# Patient Record
Sex: MALE | Race: WHITE | NOT HISPANIC OR LATINO | Employment: OTHER | URBAN - METROPOLITAN AREA
[De-identification: names, ages, dates, MRNs, and addresses within clinical notes are randomized per-mention and may not be internally consistent; named-entity substitution may affect disease eponyms.]

---

## 2017-11-02 ENCOUNTER — HOSPITAL ENCOUNTER (EMERGENCY)
Facility: HOSPITAL | Age: 77
Discharge: HOME | End: 2017-11-02
Attending: EMERGENCY MEDICINE | Admitting: EMERGENCY MEDICINE
Payer: MEDICARE

## 2017-11-02 ENCOUNTER — APPOINTMENT (EMERGENCY)
Dept: RADIOLOGY | Facility: HOSPITAL | Age: 77
End: 2017-11-02
Payer: MEDICARE

## 2017-11-02 VITALS
BODY MASS INDEX: 26.66 KG/M2 | WEIGHT: 180 LBS | SYSTOLIC BLOOD PRESSURE: 150 MMHG | DIASTOLIC BLOOD PRESSURE: 73 MMHG | OXYGEN SATURATION: 100 % | RESPIRATION RATE: 18 BRPM | HEIGHT: 69 IN | TEMPERATURE: 98.2 F | HEART RATE: 85 BPM

## 2017-11-02 DIAGNOSIS — S52.90XA RADIUS FRACTURE: Primary | ICD-10-CM

## 2017-11-02 PROCEDURE — 73110 X-RAY EXAM OF WRIST: CPT

## 2017-11-02 PROCEDURE — 99283 EMERGENCY DEPT VISIT LOW MDM: CPT

## 2017-11-02 RX ORDER — OXYCODONE HYDROCHLORIDE AND ACETAMINOPHEN 5; 325 MG/1; MG/1
1 TABLET ORAL EVERY 4 HOURS PRN
Qty: 20 TABLET | Refills: 0 | Status: SHIPPED | OUTPATIENT
Start: 2017-11-02 | End: 2017-11-09

## 2017-11-02 RX ORDER — ALLOPURINOL 100 MG/1
100 TABLET ORAL 2 TIMES DAILY
COMMUNITY

## 2017-11-02 RX ORDER — PIOGLITAZONEHYDROCHLORIDE 45 MG/1
45 TABLET ORAL DAILY
Status: ON HOLD | COMMUNITY
End: 2019-01-10

## 2017-11-02 RX ORDER — REPAGLINIDE 0.5 MG/1
1 TABLET ORAL
COMMUNITY
End: 2018-12-18

## 2017-11-02 RX ORDER — OXYCODONE HYDROCHLORIDE AND ACETAMINOPHEN 5; 325 MG/1; MG/1
2 TABLET ORAL ONCE
Status: COMPLETED | OUTPATIENT
Start: 2017-11-02 | End: 2017-11-02

## 2017-11-02 RX ORDER — IRBESARTAN 300 MG/1
300 TABLET ORAL
COMMUNITY
End: 2019-01-16 | Stop reason: HOSPADM

## 2017-11-02 RX ORDER — GLYBURIDE 5 MG/1
5 TABLET ORAL 2 TIMES DAILY WITH MEALS
COMMUNITY
End: 2020-07-15

## 2017-11-02 RX ORDER — BRIMONIDINE TARTRATE, TIMOLOL MALEATE 2; 5 MG/ML; MG/ML
1 SOLUTION/ DROPS OPHTHALMIC EVERY 12 HOURS SCHEDULED
COMMUNITY
End: 2019-01-16 | Stop reason: HOSPADM

## 2017-11-02 RX ORDER — LATANOPROST 50 UG/ML
1 SOLUTION/ DROPS OPHTHALMIC
COMMUNITY

## 2017-11-02 RX ORDER — PROPRANOLOL HYDROCHLORIDE AND HYDROCHLOROTHIAZIDE 80; 25 MG/1; MG/1
1 TABLET ORAL DAILY
COMMUNITY
End: 2018-12-18

## 2017-11-02 RX ORDER — FUROSEMIDE 40 MG/1
40 TABLET ORAL DAILY
COMMUNITY
End: 2021-01-11

## 2017-11-02 RX ADMIN — OXYCODONE HYDROCHLORIDE AND ACETAMINOPHEN 2 TABLET: 5; 325 TABLET ORAL at 11:50

## 2017-11-02 NOTE — DISCHARGE INSTRUCTIONS
Arm Fracture in Adults   WHAT YOU NEED TO KNOW:   An arm fracture is a crack or break in one or more of the bones in your arm  An arm fracture may be caused by a fall onto an outstretched hand  It may also be caused by trauma from a car accident or a sports injury  Osteoporosis (brittle bones) can increase your risk for a fracture  DISCHARGE INSTRUCTIONS:   Return to the emergency department if:   · The pain in your injured arm does not get better or gets worse, even after you rest and take medicine  · Your injured arm, hand, or fingers feel numb  · Your arm is swollen, red, and feels warm  · Your skin over the arm fracture is swollen, cold, or pale  · You cannot move your arm, hand, or fingers  Contact your healthcare provider if:   · You have a fever  · Your brace or splint becomes wet, damaged, or comes off  · You have questions or concerns about your injury, treatment, or care  Medicines:   · NSAIDs , such as ibuprofen, help decrease swelling and pain  This medicine is available with or without a doctor's order  NSAIDs can cause stomach bleeding or kidney problems in certain people  If you take blood thinner medicine, always ask your healthcare provider if NSAIDs are safe for you  Always read the medicine label and follow directions  · Acetaminophen  decreases pain  It is available without a doctor's order  Ask how much to take and how often to take it  Follow directions  Acetaminophen can cause liver damage if not taken correctly  · Prescription pain medicine  may be given  Ask how to take this medicine safely  · Take your medicine as directed  Contact your healthcare provider if you think your medicine is not helping or if you have side effects  Tell him or her if you are allergic to any medicine  Keep a list of the medicines, vitamins, and herbs you take  Include the amounts, and when and why you take them  Bring the list or the pill bottles to follow-up visits   Carry your medicine list with you in case of an emergency  Follow up with your healthcare provider within 1 week: You may need to see a bone specialist within 3 to 4 days if you need surgery or further treatment for your arm fracture  Write down your questions so you remember to ask them during your visits  Rest:  You should rest your arm as much as possible  Ask your healthcare provider when you can put pressure or weight on your arm  Also ask when you can return to sports or vigorous exercises  Ice:  Apply ice on your arm for 15 to 20 minutes every hour or as directed  Use an ice pack, or put crushed ice in a plastic bag  Cover it with a towel  Ice helps prevent tissue damage and decreases swelling and pain  Elevate:  Elevate your arm above the level of your heart as often as you can  This will help decrease swelling and pain  Prop your arm on pillows or blankets to keep it elevated comfortably  Care for your cast or splint:  Ask your healthcare provider when it is okay to bathe  Do not get your cast or splint wet  Before you take a bath or shower, cover your cast or splint with a plastic bag  Tape the bag to your skin to help keep water out  Hold your arm away from the water in case the bag leaks  · Check the skin around your cast or splint each day for any redness or open skin  · Do not use a sharp or pointed object to scratch your skin under the cast or splint  Physical therapy:  A physical therapist teaches you exercises to help improve movement and strength, and to decrease pain  © 2017 [employer] Information is for End User's use only and may not be sold, redistributed or otherwise used for commercial purposes  All illustrations and images included in CareNotes® are the copyrighted property of [employer] , [employer]  or Osmany Doss  The above information is an  only  It is not intended as medical advice for individual conditions or treatments   Talk to your doctor, nurse or pharmacist before following any medical regimen to see if it is safe and effective for you

## 2017-11-02 NOTE — ED PROVIDER NOTES
History  Chief Complaint   Patient presents with    Arm Injury     Pt sts fell, injured left wrist   Obvious deformity to same     Patient was carrying a heavy object, ie, a water hose down the cellar steps when he slipped and fell on outstretched left hand  This occurred an hour and half ago  The patient has an obvious deformity to the left wrist, states he knows it is broken  Patient 1st finished  the chore he was doing, then drove home we get somebody to drive him to the hospital   Patient had no prior fractures  He is right-handed  Prior to Admission Medications   Prescriptions Last Dose Informant Patient Reported? Taking?   allopurinol (ZYLOPRIM) 100 mg tablet   Yes Yes   Sig: Take 100 mg by mouth 2 (two) times a day   brimonidine-timolol (COMBIGAN) 0 2-0 5 %   Yes Yes   Sig: Administer 1 drop to both eyes every 12 (twelve) hours   furosemide (LASIX) 40 mg tablet   Yes Yes   Sig: Take 40 mg by mouth daily   glyBURIDE (DIABETA) 5 mg tablet   Yes Yes   Sig: Take 10 mg by mouth 2 (two) times a day with meals   irbesartan (AVAPRO) 300 mg tablet   Yes Yes   Sig: Take 300 mg by mouth daily at bedtime   latanoprost (XALATAN) 0 005 % ophthalmic solution   Yes Yes   Si drop daily at bedtime   pioglitazone (ACTOS) 45 mg tablet   Yes Yes   Sig: Take 45 mg by mouth daily   propranolol-hydrochlorothiazide (INDERIDE) 80-25 MG per tablet   Yes Yes   Sig: Take 1 tablet by mouth daily   repaglinide (PRANDIN) 0 5 mg tablet   Yes Yes   Sig: Take 1 mg by mouth 2 (two) times a day before meals      Facility-Administered Medications: None       Past Medical History:   Diagnosis Date    Diabetes mellitus ([facility] [address] )        History reviewed  No pertinent surgical history  History reviewed  No pertinent family history  I have reviewed and agree with the history as documented      Social History   Substance Use Topics    Smoking status: Former Smoker    Smokeless tobacco: Former User    Alcohol use No Review of Systems   Constitutional: Negative for fever  Musculoskeletal: Positive for arthralgias, joint swelling and myalgias  Negative for back pain and neck pain  Neurological: Negative for syncope and numbness  All other systems reviewed and are negative  Physical Exam  ED Triage Vitals [11/02/17 1120]   Temperature Pulse Respirations Blood Pressure SpO2   98 2 °F (36 8 °C) 85 18 150/73 100 %      Temp src Heart Rate Source Patient Position - Orthostatic VS BP Location FiO2 (%)   -- -- -- -- --      Pain Score       8           Orthostatic Vital Signs  Vitals:    11/02/17 1120   BP: 150/73   Pulse: 85       Physical Exam   Constitutional: He is oriented to person, place, and time  He appears well-developed and well-nourished  HENT:   Head: Normocephalic and atraumatic  Mouth/Throat: Oropharynx is clear and moist    Eyes: Conjunctivae are normal    Neck: Normal range of motion  Neck supple  Cardiovascular: Normal rate, regular rhythm, normal heart sounds and intact distal pulses  Pulmonary/Chest: Effort normal and breath sounds normal    Abdominal: Soft  There is no tenderness  Musculoskeletal: He exhibits tenderness and deformity  Tender deformity of the distal left wrist, fingers are all neurovascularly intact   Neurological: He is alert and oriented to person, place, and time  Skin: Skin is warm and dry  Psychiatric: He has a normal mood and affect  His behavior is normal    Vitals reviewed  ED Medications  Medications   oxyCODONE-acetaminophen (PERCOCET) 5-325 mg per tablet 2 tablet (2 tablets Oral Given 11/2/17 1150)       Diagnostic Studies  Results Reviewed     None                 XR wrist 3+ views LEFT   Final Result by Franklin Jeronimo MD (11/02 1205)      Fracture in the distal left radius with slight posterior displacement           Workstation performed: [id number]                    Procedures  Procedures       Phone Contacts  ED Phone Contact    ED Course  ED Course                                MDM  Number of Diagnoses or Management Options  Diagnosis management comments: Patient likely fractured the wrist   We will provide some pain medication and get x-rays asap  The case and the x-rays were discussed with Dr Sinan Dean from Orthopedics  He suggested a sugar-tong splint, which I placed on the patient recheck his neurovascular status, and placed him in a sling  He will follow up Monday or Tuesday    CritCare Time    Disposition  Final diagnoses:   Radius fracture     Time reflects when diagnosis was documented in both MDM as applicable and the Disposition within this note     Time User Action Codes Description Comment    11/2/2017 12:28 PM Stanley Poole Add [S52 [id number]] Radius fracture       ED Disposition     ED Disposition Condition Comment    Discharge  [facility] discharge to home/self care  Condition at discharge: Stable        Follow-up Information     Follow up With Specialties Details Why Cindy [facility] , DO Orthopedic Surgery Schedule an appointment as soon as possible for a visit in 1 day  [employer] [address]  [phone]          Patient's Medications   Discharge Prescriptions    OXYCODONE-ACETAMINOPHEN (PERCOCET) 5-325 MG PER TABLET    Take 1 tablet by mouth every 4 (four) hours as needed for severe pain for up to 7 days Max Daily Amount: 6 tablets       Start Date: 11/2/2017 End Date: 11/9/2017       Order Dose: 1 tablet       Quantity: 20 tablet    Refills: 0     No discharge procedures on file      ED Provider  Electronically Signed by           Ephraim Ly MD  11/02/17 6268

## 2017-11-07 ENCOUNTER — APPOINTMENT (OUTPATIENT)
Dept: RADIOLOGY | Facility: CLINIC | Age: 77
End: 2017-11-07
Payer: MEDICARE

## 2017-11-07 ENCOUNTER — ALLSCRIPTS OFFICE VISIT (OUTPATIENT)
Dept: OTHER | Facility: OTHER | Age: 77
End: 2017-11-07

## 2017-11-07 DIAGNOSIS — M25.532 PAIN IN LEFT WRIST: ICD-10-CM

## 2017-11-07 DIAGNOSIS — S52.502A CLOSED FRACTURE OF LOWER END OF LEFT RADIUS: ICD-10-CM

## 2017-11-07 PROCEDURE — 73100 X-RAY EXAM OF WRIST: CPT

## 2017-11-08 NOTE — PROGRESS NOTES
Assessment  1  Left wrist pain (783 43) (P57 178)   2  Closed traumatic displaced fracture of distal end of left radius, initial encounter (573 42)   (A23 885A)    Plan  Left wrist pain    · * XR WRIST 3+ VIEW LEFT; Status:Active - Retrospective By Protocol Authorization; Requested for:07Nov2017;    · XR WRIST 2 VIEW LEFT; Status:Active; Requested TTH:[id number];     Discussion/Summary    Mayco Avitia is a right-hand-dominant 22-year-old male that presents today with an extra-articular distal radius fracture that is minimally displaced  We discussed operative versus non operative treatment options and the risks and benefits of both  The patient would prefer to undergo a hematoma block and reduction of his 11day-old fracture here in the office today in attempts to avoid surgical intervention  This was performed here in the office today please see the procedure note above  Post reduction x-rays demonstrate reduction of the dorsal displacement and angulation with proper splint application  I advised the patient that if his distal radius remains in this position over the next week we may continue to attempt closed treatment with serial x-rays and casting  If his displacement returns and his distal radius continues to fall off dorsally he will need surgical fixation  He is aware this  I advised him to keep his splint on clean and dry  He was neurovascularly intact after the splinting procedure  He is to be nonweightbearing left upper extremity  I will see him back in 1 week with x-rays of his left distal radius in the splint  The patient may call the office at anytime if any questions or concerns should arise  The patient has the current Goals: Left distal radius fracture healing  Patient is able to Self-Care  Educational resources provided: Operative versus non operative treatment recommendations discussed  The treatment plan was reviewed with the patient/guardian   The patient/guardian understands and agrees with the treatment plan      Chief Complaint  1  Wrist Pain  Left distal radius fracture      History of Present Illness  HPI: Cj Cross is a 69-year-old right-hand-dominant  that is here for evaluation of left wrist pain after a fall down in the basement 5 days ago  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 and tripped and fell onto his outstretched hand  Medially after the fall he had pain and swelling in his left wrist  He had difficulty with motion  He went to the emergency department and was found to have a distal radius fracture  He denies paresthesias and numbness into his left hand  He does report swelling in his left hand  Review of Systems    Constitutional: No fever or chills, feels well, no tiredness, no recent weight loss or weight gain  Eyes: No complaints of red eyes, no eyesight problems  ENT: no complaints of loss of hearing, no nosebleeds, no sore throat  Cardiovascular: No complaints of chest pain, no palpitations, no leg claudication or lower extremity edema  Respiratory: No complaints of shortness of breath, no wheezing, no cough  Gastrointestinal: No complaints of abdominal pain, no constipation, no nausea or vomiting, no diarrhea or bloody stools  Genitourinary: No complaints of dysuria or incontinence, no hesitancy, no nocturia  Musculoskeletal: arthralgias,-- joint swelling-- and-- joint stiffness, but-- no limb pain,-- no myalgias-- and-- no limb swelling  Integumentary: No complaints of skin rash or lesion, no itching or dry skin, no skin wounds  Neurological: No complaints of headache, no confusion, no numbness or tingling, no dizziness  Psychiatric: No suicidal thoughts, no anxiety, no depression  Endocrine: No muscle weakness, no frequent urination, no excessive thirst, no feelings of weakness  Active Problems  1   Left wrist pain (679 43) (Q98 828)    Past Medical History   · History of diabetes mellitus (V12 29) (Z86 39)   · History of hypertension (V12 59) (Z86 79)   · History of Skin melanoma (172 9) (C43 9)    The active problems and past medical history were reviewed and updated today  Surgical History   · History of Cataract Surgery    The surgical history was reviewed and updated today  Family History  Mother    · No pertinent family history    The family history was reviewed and updated today  Social History   · Never a smoker  The social history was reviewed and updated today  Current Meds   1  Allopurinol 100 MG Oral Tablet; Therapy: (0523-7600548) to Recorded   2  Combigan SOLN;   Therapy: (Recorded:07Nov2017) to Recorded   3  Furosemide TABS; Therapy: (0523-7600548) to Recorded   4  GlyBURIDE TABS; Therapy: (0523-7600548) to Recorded   5  Irbesartan TABS; Therapy: (0523-7600548) to Recorded   6  Latanoprost 0 005 % Ophthalmic Solution; Therapy: (0523-7600548) to Recorded   7  Pioglitazone HCl TABS; Therapy: (0523-7600548) to Recorded   8  Propranolol-HCTZ TABS; Therapy: (0523-7600548) to Recorded   9  Repaglinide TABS; Therapy: (0523-7600548) to Recorded    The medication list was reviewed and updated today  Allergies  1  Sulfur    Vitals  Signs   Heart Rate: 86  Systolic: 989  Diastolic: 69  Height: 5 ft 9 in  Weight: 189 lb 6 08 oz  BMI Calculated: 27 97  BSA Calculated: 2 02    Physical Exam  General:  Awake alert orient x3, no acute distress left wrist skin diffusely intact, there is a mild dorsal deformity with tenderness to palpation  There is significant bruising and swelling in the hand without tenderness to palpation  No neurologic deficits in the wrist and hand  Active and passive range of motion of the fingers intact   Compartments soft in the forearm     Constitutional - General appearance: Normal    Musculoskeletal - Gait and station: Normal -- Muscle strength/tone: Normal -- Upper extremity compartments: Normal    Cardiovascular - Pulses: Normal    Skin - Skin and subcutaneous tissue: Normal    Neurologic - Sensation: Normal -- Lower extremity peripheral neuro exam: Normal    Psychiatric - Orientation to person, place, and time: Normal -- Mood and affect: Normal    Eyes   Conjunctiva and lids: Normal     Pupils and irises: Normal        Results/Data  I personally reviewed the films/images/results in the office today  My interpretation follows  X-ray Review X-rays obtained here in the office demonstrate extra-articular distal radius fracture with interval displacement dorsally after initial emergency room evaluation and radiographs  There is no intra-articular extension  There is mild dorsal displacement with still 50 percent cortical apposition however increased dorsal angulation  There is mild loss of height of the distal radius as well   reduction x-rays reviewed reviewed by me demonstrate adequate reduction of the extra-articular distal radius fracture with restoration of alignment and decreased dorsal angulation  There is dorsal cortical comminution  Procedure  Procedure: Splint application  of the left wrist    Material: Plaster over Webril  Type: distal sugar tong with the joint in slight flexion  Patient Status:  neurovascular exam after splint/cast application was unchanged  After informed verbal consent was obtained a left wrist hematoma block using 1 percent lidocaine plain and 0 5 percent Marcaine plain was infiltrated into the left distal radius fracture site  After anesthesia had taken hold the patient was placed into finger traps to allow ligamentous taxis  The left upper extremity was wrapped with Webril in preparation for plaster sugar-tong splint application  A reduction maneuver was performed after anesthesia had taken effect and a sugar-tong splint was applied to the left distal radius  Post reduction x-rays were ordered and reviewed   Patient was neurovascular intact after the reduction maneuver  Future Appointments    Date/Time Provider Specialty Site   11/14/2017 04:30 PM Jose Lee DO Orthopedic Surgery [facility]     Signatures   Electronically signed by :  Raine Cevallos DO; Nov 7 2017  6:05PM EST                       (Author)

## 2017-11-14 ENCOUNTER — GENERIC CONVERSION - ENCOUNTER (OUTPATIENT)
Dept: OTHER | Facility: OTHER | Age: 77
End: 2017-11-14

## 2017-11-14 ENCOUNTER — APPOINTMENT (OUTPATIENT)
Dept: RADIOLOGY | Facility: CLINIC | Age: 77
End: 2017-11-14
Payer: MEDICARE

## 2017-11-14 DIAGNOSIS — S52.502A CLOSED FRACTURE OF LOWER END OF LEFT RADIUS: ICD-10-CM

## 2017-11-14 PROCEDURE — 73100 X-RAY EXAM OF WRIST: CPT

## 2017-11-21 ENCOUNTER — APPOINTMENT (OUTPATIENT)
Dept: RADIOLOGY | Facility: CLINIC | Age: 77
End: 2017-11-21
Payer: MEDICARE

## 2017-11-21 ENCOUNTER — GENERIC CONVERSION - ENCOUNTER (OUTPATIENT)
Dept: OTHER | Facility: OTHER | Age: 77
End: 2017-11-21

## 2017-11-21 DIAGNOSIS — S52.502A CLOSED FRACTURE OF LOWER END OF LEFT RADIUS: ICD-10-CM

## 2017-11-21 PROCEDURE — 73100 X-RAY EXAM OF WRIST: CPT

## 2017-12-12 ENCOUNTER — GENERIC CONVERSION - ENCOUNTER (OUTPATIENT)
Dept: OTHER | Facility: OTHER | Age: 77
End: 2017-12-12

## 2017-12-12 ENCOUNTER — APPOINTMENT (OUTPATIENT)
Dept: RADIOLOGY | Facility: CLINIC | Age: 77
End: 2017-12-12
Payer: MEDICARE

## 2017-12-12 DIAGNOSIS — S52.502A CLOSED FRACTURE OF LOWER END OF LEFT RADIUS: ICD-10-CM

## 2017-12-12 PROCEDURE — 73100 X-RAY EXAM OF WRIST: CPT

## 2018-01-02 ENCOUNTER — APPOINTMENT (OUTPATIENT)
Dept: RADIOLOGY | Facility: CLINIC | Age: 78
End: 2018-01-02
Payer: MEDICARE

## 2018-01-02 ENCOUNTER — GENERIC CONVERSION - ENCOUNTER (OUTPATIENT)
Dept: OTHER | Facility: OTHER | Age: 78
End: 2018-01-02

## 2018-01-02 DIAGNOSIS — S52.502A CLOSED FRACTURE OF LOWER END OF LEFT RADIUS: ICD-10-CM

## 2018-01-02 PROCEDURE — 73100 X-RAY EXAM OF WRIST: CPT

## 2018-01-13 VITALS
HEIGHT: 69 IN | WEIGHT: 189.38 LBS | HEART RATE: 86 BPM | BODY MASS INDEX: 28.05 KG/M2 | DIASTOLIC BLOOD PRESSURE: 69 MMHG | SYSTOLIC BLOOD PRESSURE: 150 MMHG

## 2018-01-22 VITALS
HEART RATE: 81 BPM | SYSTOLIC BLOOD PRESSURE: 156 MMHG | WEIGHT: 189.38 LBS | BODY MASS INDEX: 28.05 KG/M2 | HEIGHT: 69 IN | DIASTOLIC BLOOD PRESSURE: 71 MMHG

## 2018-01-22 VITALS — SYSTOLIC BLOOD PRESSURE: 198 MMHG | HEART RATE: 80 BPM | DIASTOLIC BLOOD PRESSURE: 84 MMHG

## 2018-01-22 VITALS
BODY MASS INDEX: 29.25 KG/M2 | DIASTOLIC BLOOD PRESSURE: 78 MMHG | WEIGHT: 197.5 LBS | SYSTOLIC BLOOD PRESSURE: 203 MMHG | HEART RATE: 73 BPM | HEIGHT: 69 IN

## 2018-01-24 VITALS
HEIGHT: 69 IN | HEART RATE: 64 BPM | SYSTOLIC BLOOD PRESSURE: 212 MMHG | DIASTOLIC BLOOD PRESSURE: 79 MMHG | BODY MASS INDEX: 29.25 KG/M2 | WEIGHT: 197.5 LBS

## 2018-01-24 VITALS
BODY MASS INDEX: 26.36 KG/M2 | WEIGHT: 178 LBS | HEIGHT: 69 IN | SYSTOLIC BLOOD PRESSURE: 204 MMHG | DIASTOLIC BLOOD PRESSURE: 64 MMHG

## 2018-02-09 ENCOUNTER — APPOINTMENT (OUTPATIENT)
Dept: RADIOLOGY | Facility: CLINIC | Age: 78
End: 2018-02-09
Payer: MEDICARE

## 2018-02-09 VITALS
HEIGHT: 69 IN | BODY MASS INDEX: 26.19 KG/M2 | HEART RATE: 80 BPM | WEIGHT: 176.8 LBS | DIASTOLIC BLOOD PRESSURE: 64 MMHG | SYSTOLIC BLOOD PRESSURE: 126 MMHG

## 2018-02-09 DIAGNOSIS — S52.502G CLOSED TRAUMATIC DISPLACED FRACTURE OF DISTAL END OF LEFT RADIUS WITH DELAYED HEALING, SUBSEQUENT ENCOUNTER: Primary | ICD-10-CM

## 2018-02-09 DIAGNOSIS — S52.502K: Primary | ICD-10-CM

## 2018-02-09 PROBLEM — S52.509G: Status: ACTIVE | Noted: 2018-02-09

## 2018-02-09 PROCEDURE — 73100 X-RAY EXAM OF WRIST: CPT

## 2018-02-09 PROCEDURE — 99213 OFFICE O/P EST LOW 20 MIN: CPT | Performed by: ORTHOPAEDIC SURGERY

## 2018-02-09 NOTE — PROGRESS NOTES
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  At this point I am concerned with his bone biology and his underlying medical comorbidities that he is unable to rapidly progress to union  With this in mind I recommended he undergo exogen bone stimulator application and use  Arrangements will be made for him  His cast was maintained today after his x-rays were reviewed in preparation for his upcoming bone stimulator appointment at which point the portable be introduced into his new cast to accommodate the bone stimulator  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 and I advised him that I will be happy to fill out any paperwork to help accommodate appropriate schedule for taking this exam in light of his closed treatment of his distal radius fracture  He will return to the office with bone stimulator arrangements are made and can be coordinated  Subjective: 3 month follow-up closed treatment of left distal radius fracture  Patient ID: Kenyatta Enciso is a 76 y o  male  HPI  Jorge Sebastian is here for 3 month follow-up status post left extra-articular distal radius fracture  He denies any new onset pain  He denies any paresthesias in his wrist or hand  He reports compliance with cast care  He does report 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 he has questions about performing this surface case shin testing with the cast off  Review of Systems   Constitutional: Negative  HENT: Negative  Eyes: Negative  Respiratory: Negative  Cardiovascular: Negative  Gastrointestinal: Negative  Endocrine: Negative  Musculoskeletal: Negative  Skin: Negative  Neurological: Negative  Psychiatric/Behavioral: Negative  Past Medical History:   Diagnosis Date    Diabetes mellitus ([facility] [address] )        No past surgical history on file  No family history on file  Social History     Occupational History    Not on file  Social History Main Topics    Smoking status: Former Smoker    Smokeless tobacco: Former User    Alcohol use No    Drug use: No    Sexual activity: Not on file         Current Outpatient Prescriptions:     allopurinol (ZYLOPRIM) 100 mg tablet, Take 100 mg by mouth 2 (two) times a day, Disp: , Rfl:     brimonidine-timolol (COMBIGAN) 0 2-0 5 %, Administer 1 drop to both eyes every 12 (twelve) hours, Disp: , Rfl:     furosemide (LASIX) 40 mg tablet, Take 40 mg by mouth daily, Disp: , Rfl:     glyBURIDE (DIABETA) 5 mg tablet, Take 10 mg by mouth 2 (two) times a day with meals, Disp: , Rfl:     irbesartan (AVAPRO) 300 mg tablet, Take 300 mg by mouth daily at bedtime, Disp: , Rfl:     latanoprost (XALATAN) 0 005 % ophthalmic solution, 1 drop daily at bedtime, Disp: , Rfl:     pioglitazone (ACTOS) 45 mg tablet, Take 45 mg by mouth daily, Disp: , Rfl:     propranolol-hydrochlorothiazide (INDERIDE) 80-25 MG per tablet, Take 1 tablet by mouth daily, Disp: , Rfl:     repaglinide (PRANDIN) 0 5 mg tablet, Take 1 mg by mouth 2 (two) times a day before meals, Disp: , Rfl:     Allergies   Allergen Reactions    Sulfa Antibiotics        Objective:  Vitals:    02/09/18 1523   BP: 126/64   Pulse: 80       Body mass index is 26 11 kg/m²  Left Hand Exam     Other   Erythema: absent  Sensation: normal  Pulse: present    Comments:  Short-arm cast intact, skin edges around the perimeter intact without signs of skin breakdown  Neurovascular intact in hand and fingers            Physical Exam    I have personally reviewed pertinent films in PACS    X-rays of the left wrist in cast immobilization demonstrate maintenance of extra-articular distal radius fracture with mild impaction with minimal signs of callus formation and healing  There has been no interval displacement since last evaluation  Delvin WAGGONER    Division of Adult Reconstruction  Department of [facility]

## 2018-02-13 PROBLEM — S52.502K: Status: ACTIVE | Noted: 2018-02-09

## 2018-02-21 ENCOUNTER — APPOINTMENT (OUTPATIENT)
Dept: RADIOLOGY | Facility: CLINIC | Age: 78
End: 2018-02-21
Payer: MEDICARE

## 2018-02-21 DIAGNOSIS — S52.502K: Primary | ICD-10-CM

## 2018-02-21 PROCEDURE — 29075 APPL CST ELBW FNGR SHORT ARM: CPT | Performed by: ORTHOPAEDIC SURGERY

## 2018-02-21 PROCEDURE — 73100 X-RAY EXAM OF WRIST: CPT

## 2018-02-21 PROCEDURE — 99214 OFFICE O/P EST MOD 30 MIN: CPT | Performed by: ORTHOPAEDIC SURGERY

## 2018-02-21 NOTE — PROGRESS NOTES
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       Storm Scott is here for bone stimulator application  His prior cast was removed and his skin was inspected there was no sign of gross breakdown  He was placed into a short-arm cast today with bone stimulator port application present  Cast was applied by me  Bone stimulator represented was here for instructions on use moving forward  Like to see him back in 3 weeks time for repeat evaluation cast change for skin check and continued bone stimulator use  After his cast is removed upon arrival next week we will get x-rays out of the cast prior to replacing him in 1 with the bone stimulator port  Subjective:  Patient here for follow-up for closed treatment of left distal radius nonunion  Patient ID: Katie Gee is a 76 y o  male  HPI  Patient is here for continued follow-up regarding closed treatment of his left distal radius nonunion  He is here for bone stimulator port application instillation of a short-arm cast   Bone stimulator representative his here for instructions on use and instillation process  Review of Systems   Constitutional: Positive for activity change  HENT: Negative  Eyes: Negative  Respiratory: Negative  Cardiovascular: Negative  Gastrointestinal: Negative  Endocrine: Negative  Musculoskeletal: Positive for arthralgias  Skin: Negative  Neurological: Negative  Psychiatric/Behavioral: Negative  Past Medical History:   Diagnosis Date    Diabetes mellitus ([facility] [address] )        No past surgical history on file  No family history on file  Social History     Occupational History    Not on file       Social History Main Topics    Smoking status: Former Smoker    Smokeless tobacco: Former User    Alcohol use No    Drug use: No    Sexual activity: Not on file         Current Outpatient Prescriptions:     allopurinol (ZYLOPRIM) 100 mg tablet, Take 100 mg by mouth 2 (two) times a day, Disp: , Rfl:     brimonidine-timolol (COMBIGAN) 0 2-0 5 %, Administer 1 drop to both eyes every 12 (twelve) hours, Disp: , Rfl:     furosemide (LASIX) 40 mg tablet, Take 40 mg by mouth daily, Disp: , Rfl:     glyBURIDE (DIABETA) 5 mg tablet, Take 10 mg by mouth 2 (two) times a day with meals, Disp: , Rfl:     irbesartan (AVAPRO) 300 mg tablet, Take 300 mg by mouth daily at bedtime, Disp: , Rfl:     latanoprost (XALATAN) 0 005 % ophthalmic solution, 1 drop daily at bedtime, Disp: , Rfl:     pioglitazone (ACTOS) 45 mg tablet, Take 45 mg by mouth daily, Disp: , Rfl:     propranolol-hydrochlorothiazide (INDERIDE) 80-25 MG per tablet, Take 1 tablet by mouth daily, Disp: , Rfl:     repaglinide (PRANDIN) 0 5 mg tablet, Take 1 mg by mouth 2 (two) times a day before meals, Disp: , Rfl:     Allergies   Allergen Reactions    Sulfa Antibiotics     Sulfur        Objective: There were no vitals filed for this visit  There is no height or weight on file to calculate BMI  Right Knee Exam     Other   Other tests: effusion present      Right Hand Exam     Tenderness   Right hand tenderness location: Volar and dorsal wrist       Left Hand Exam     Tenderness   Left hand tenderness location: Volar and dorsal wrist      Range of Motion     Wrist   Extension: abnormal   Flexion: abnormal     Other   Erythema: absent  Scars: absent  Sensation: normal  Pulse: present    Comments:  Mild tenderness at volar and dorsal distal radius  No signs of skin breakdown in the palm or wrist           Observations     Right Knee   Positive for effusion  Physical Exam   Constitutional: He is oriented to person, place, and time  He appears well-developed  HENT:   Head: Atraumatic  Eyes: EOM are normal    Neck: Neck supple  Cardiovascular: Normal rate  Pulmonary/Chest: Effort normal    Abdominal: Soft  Musculoskeletal:        Right knee: He exhibits effusion     See orthopedic exam Neurological: He is alert and oriented to person, place, and time  Skin: Skin is warm and dry  Psychiatric: He has a normal mood and affect  Nursing note and vitals reviewed  I have personally reviewed pertinent films in PACS  X-rays of the left wrist reviewed by me after cast placement  X-rays demonstrate appropriate position of bone stimulator port over fracture site  Cast is well placed and aligned  Charla WAGGONER    Division of Adult Reconstruction  Department of [facility]

## 2018-03-14 ENCOUNTER — OFFICE VISIT (OUTPATIENT)
Dept: OBGYN CLINIC | Facility: CLINIC | Age: 78
End: 2018-03-14
Payer: MEDICARE

## 2018-03-14 ENCOUNTER — APPOINTMENT (OUTPATIENT)
Dept: RADIOLOGY | Facility: CLINIC | Age: 78
End: 2018-03-14
Payer: MEDICARE

## 2018-03-14 VITALS — BODY MASS INDEX: 26.36 KG/M2 | HEIGHT: 69 IN | WEIGHT: 178 LBS

## 2018-03-14 DIAGNOSIS — S52.502K: ICD-10-CM

## 2018-03-14 DIAGNOSIS — S52.502K: Primary | ICD-10-CM

## 2018-03-14 PROCEDURE — 99214 OFFICE O/P EST MOD 30 MIN: CPT | Performed by: ORTHOPAEDIC SURGERY

## 2018-03-14 PROCEDURE — 73100 X-RAY EXAM OF WRIST: CPT

## 2018-03-14 NOTE — PROGRESS NOTES
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  XR wrist 2 vw left     Josselin Sanders is here for continued follow-up of his closed treatment of his left distal radius fracture that progressed to nonunion prior to bone stimulator use  X-rays obtained today demonstrate increased callus formation around the fracture site illustrating that the bone stimulator has been beneficial to his fracture care  He is clinically asymptomatic on wrist exam today however he is stiff  At this point we can discontinue short-arm cast wear and transition him to a cock-up wrist splint  There was an area of skin irritation from where the bone stimulator port was a placed warning signs of impending skin issues were given to the patient and the patient's son here in the office today and they demonstrated understanding of this and will return to the office if any changes occur in the patient's skin in this area  He was advised to continue using his bone stimulator while using the cock-up wrist splint  I will see him back in 4 weeks time for repeat x-rays out of the cock-up wrist splint after which point hip bony healing is still progressively occurring we may consider weaning out of the cock-up wrist splint and starting occupational therapy  Subjective:  4 months status post closed treatment left distal radius fracture  Patient ID: Bethel Novoa is a 76 y o  male  HPI  Patient is here for continued follow-up regarding his left distal radius fracture  He has been undergoing closed treatment for the last 4 months in a short-arm cast and has progressed to a nonunion/delayed union  A bone stimulator was implemented on his last visit with me and patient has tolerated it well and used appropriately  He denies any increased paresthesias or pain in his wrist  He has been compliant with cast wear      Review of Systems   Constitutional: Positive for activity change  HENT: Negative  Eyes: Negative  Respiratory: Negative  Cardiovascular: Negative  Gastrointestinal: Negative  Endocrine: Negative  Musculoskeletal: Negative  Skin: Negative  Neurological: Negative  Psychiatric/Behavioral: Negative  Past Medical History:   Diagnosis Date    Diabetes mellitus ([facility] [address] )        No past surgical history on file  No family history on file  Social History     Occupational History    Not on file  Social History Main Topics    Smoking status: Former Smoker    Smokeless tobacco: Former User    Alcohol use No    Drug use: No    Sexual activity: Not on file         Current Outpatient Prescriptions:     allopurinol (ZYLOPRIM) 100 mg tablet, Take 100 mg by mouth 2 (two) times a day, Disp: , Rfl:     brimonidine-timolol (COMBIGAN) 0 2-0 5 %, Administer 1 drop to both eyes every 12 (twelve) hours, Disp: , Rfl:     furosemide (LASIX) 40 mg tablet, Take 40 mg by mouth daily, Disp: , Rfl:     glyBURIDE (DIABETA) 5 mg tablet, Take 10 mg by mouth 2 (two) times a day with meals, Disp: , Rfl:     irbesartan (AVAPRO) 300 mg tablet, Take 300 mg by mouth daily at bedtime, Disp: , Rfl:     latanoprost (XALATAN) 0 005 % ophthalmic solution, 1 drop daily at bedtime, Disp: , Rfl:     pioglitazone (ACTOS) 45 mg tablet, Take 45 mg by mouth daily, Disp: , Rfl:     propranolol-hydrochlorothiazide (INDERIDE) 80-25 MG per tablet, Take 1 tablet by mouth daily, Disp: , Rfl:     repaglinide (PRANDIN) 0 5 mg tablet, Take 1 mg by mouth 2 (two) times a day before meals, Disp: , Rfl:     Allergies   Allergen Reactions    Sulfa Antibiotics     Sulfur        Objective: There were no vitals filed for this visit  Body mass index is 26 29 kg/m²  Right Knee Exam     Other   Other tests: effusion present      Left Hand Exam     Tenderness   The patient is experiencing no tenderness           Range of Motion     Wrist   Extension: abnormal   Flexion: abnormal   Pronation: abnormal Supination: abnormal     Other   Erythema: present  Scars: absent  Sensation: normal  Pulse: present    Comments: There is a small area of redness in a circular orientation consistent with port placement from the bone stimulator in his short-arm cast   There is no signs of necrosis or skin compromise  Neurovascularly intact and the patient is nontender over the distal aspect of the radius over the dorsal and volar aspect          Observations     Right Knee   Positive for effusion  Physical Exam   Constitutional: He is oriented to person, place, and time  He appears well-developed  HENT:   Head: Atraumatic  Eyes: EOM are normal    Neck: Neck supple  Cardiovascular: Normal rate  Pulmonary/Chest: Effort normal    Abdominal: Soft  Musculoskeletal:        Right knee: He exhibits effusion  See orthopedic exam   Neurological: He is alert and oriented to person, place, and time  Skin: Skin is warm and dry  Psychiatric: He has a normal mood and affect  Nursing note and vitals reviewed  I have personally reviewed pertinent films in PACS  X-rays demonstrate interval healing with increased callus around his distal radius fracture  There has been no interval displacement  Fracture line is still evident but callus formation present  Kenyatta WAGGONER    Division of Adult Reconstruction  Department of [facility]

## 2018-04-11 ENCOUNTER — OFFICE VISIT (OUTPATIENT)
Dept: OBGYN CLINIC | Facility: CLINIC | Age: 78
End: 2018-04-11
Payer: MEDICARE

## 2018-04-11 ENCOUNTER — APPOINTMENT (OUTPATIENT)
Dept: RADIOLOGY | Facility: CLINIC | Age: 78
End: 2018-04-11
Payer: MEDICARE

## 2018-04-11 VITALS
DIASTOLIC BLOOD PRESSURE: 64 MMHG | WEIGHT: 166 LBS | SYSTOLIC BLOOD PRESSURE: 180 MMHG | BODY MASS INDEX: 24.51 KG/M2 | HEART RATE: 69 BPM

## 2018-04-11 DIAGNOSIS — S52.502P CLOSED TRAUMATIC DISPLACED FRACTURE OF DISTAL END OF LEFT RADIUS WITH MALUNION: Primary | ICD-10-CM

## 2018-04-11 DIAGNOSIS — S52.502K: ICD-10-CM

## 2018-04-11 PROCEDURE — 73100 X-RAY EXAM OF WRIST: CPT

## 2018-04-11 PROCEDURE — 99213 OFFICE O/P EST LOW 20 MIN: CPT | Performed by: ORTHOPAEDIC SURGERY

## 2018-04-11 RX ORDER — VERAPAMIL HYDROCHLORIDE 80 MG/1
80 TABLET ORAL DAILY
Refills: 0 | COMMUNITY
Start: 2018-03-09 | End: 2019-01-16 | Stop reason: HOSPADM

## 2018-04-11 RX ORDER — AZITHROMYCIN 250 MG/1
TABLET, FILM COATED ORAL
Refills: 0 | Status: ON HOLD | COMMUNITY
Start: 2018-01-26 | End: 2019-01-10

## 2018-04-11 RX ORDER — ALPRAZOLAM 0.5 MG/1
0.5 TABLET ORAL
Refills: 0 | COMMUNITY
Start: 2018-03-12

## 2018-04-11 NOTE — PROGRESS NOTES
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  XR wrist 2 vw left    Ambulatory referral to [address] [place] [address] is here for continued follow-up regarding his left distal radius fracture  The is now 5 months status post injury and he has been using his bone stimulator as directed  He is currently in a cock-up wrist splint states his pain is really not present only when he tries to do certain movements with his wrist   He does not have the pain that he had previously during the course of his treatment  At this point his x-rays demonstrate he has appear to progress from nonunion to malunion and it is time to start regaining his strength and motion  He may come out of the cock-up wrist splint 3 times a day to work on motion gripping exercises  These were demonstrated for here in the office  I also gave him a referral to occupational therapy to also work on range of motion, strengthening, and joint mobilization  He may slowly start to return to using his left hand and wrist during work more frequently  I did advise him to refrain from heavy lifting and medially until his wrist and hand get stronger  At this point I would like to see him back in approximately 6 weeks time at which point this will be a clinical follow-up to follow-up on his range of motion and overall function no new x-rays are needed at that next visit unless he has a significant increase in pain in the interval period  The patient may call the office at anytime if any questions or concerns should arise  Subjective:  5 months status post closed treatment of left distal radius fracture  Patient ID: Connor Cao is a 76 y o  male  HPI  Patient is here for continued follow-up regarding his left distal radius fracture  He has been using his bone stimulator cock-up wrist splint as directed    He states he has no more of the sharp pain that he had previously and now only has a mild ache with certain activities of motions of his wrist  He states overall he is feels much better and can feel his motion slightly improving  He denies any paresthesias in his wrist and hand  He is pleased that he has progressed through closed treatment and avoided surgery  Review of Systems   Constitutional: Positive for activity change  HENT: Negative  Eyes: Negative  Respiratory: Negative  Cardiovascular: Negative  Gastrointestinal: Negative  Endocrine: Negative  Musculoskeletal: Negative  Skin: Negative  Neurological: Negative  Psychiatric/Behavioral: Negative  Past Medical History:   Diagnosis Date    Diabetes mellitus ([facility] [address] )        No past surgical history on file  No family history on file  Social History     Occupational History    Not on file       Social History Main Topics    Smoking status: Former Smoker    Smokeless tobacco: Former User    Alcohol use No    Drug use: No    Sexual activity: Not on file         Current Outpatient Prescriptions:     allopurinol (ZYLOPRIM) 100 mg tablet, Take 100 mg by mouth 2 (two) times a day, Disp: , Rfl:     ALPRAZolam (XANAX) 0 5 mg tablet, , Disp: , Rfl: 0    azithromycin (ZITHROMAX) 250 mg tablet, , Disp: , Rfl: 0    brimonidine-timolol (COMBIGAN) 0 2-0 5 %, Administer 1 drop to both eyes every 12 (twelve) hours, Disp: , Rfl:     furosemide (LASIX) 40 mg tablet, Take 40 mg by mouth daily, Disp: , Rfl:     glyBURIDE (DIABETA) 5 mg tablet, Take 10 mg by mouth 2 (two) times a day with meals, Disp: , Rfl:     irbesartan (AVAPRO) 300 mg tablet, Take 300 mg by mouth daily at bedtime, Disp: , Rfl:     latanoprost (XALATAN) 0 005 % ophthalmic solution, 1 drop daily at bedtime, Disp: , Rfl:     pioglitazone (ACTOS) 45 mg tablet, Take 45 mg by mouth daily, Disp: , Rfl:     propranolol-hydrochlorothiazide (INDERIDE) 80-25 MG per tablet, Take 1 tablet by mouth daily, Disp: , Rfl:     repaglinide (PRANDIN) 0 5 mg tablet, Take 1 mg by mouth 2 (two) times a day before meals, Disp: , Rfl:     verapamil (CALAN) 80 mg tablet, , Disp: , Rfl: 0    Allergies   Allergen Reactions    Sulfa Antibiotics     Sulfur        Objective:  Vitals:    04/11/18 1551   BP: (!) 180/64   Pulse: 69       Body mass index is 24 51 kg/m²  Right Knee Exam     Other   Other tests: effusion present      Left Hand Exam     Tenderness   The patient is experiencing no tenderness  Range of Motion     Wrist   Extension: abnormal   Flexion: abnormal   Pronation: abnormal   Supination: abnormal     Other   Erythema: absent  Scars: absent  Sensation: normal  Pulse: present    Comments:  T There is no signs of necrosis or skin compromise  Neurovascularly intact and the patient is nontender over the distal aspect of the radius over the dorsal and volar aspect          Observations     Right Knee   Positive for effusion  Physical Exam   Constitutional: He is oriented to person, place, and time  He appears well-developed  HENT:   Head: Atraumatic  Eyes: EOM are normal    Neck: Neck supple  Cardiovascular: Normal rate  Pulmonary/Chest: Effort normal    Abdominal: Soft  Musculoskeletal:        Right knee: He exhibits effusion  See orthopedic exam   Neurological: He is alert and oriented to person, place, and time  Skin: Skin is warm and dry  Psychiatric: He has a normal mood and affect  Nursing note and vitals reviewed  I have personally reviewed pertinent films in PACS  X-rays of the left distal radius demonstrate malunion of the left distal radius with significant callus and bridging callus formation  Fracture line on the AP and the transverse plane is no longer visible  There has been no interval displacement or change in alignment since last x-ray evaluation  Era Eastern D O    Division of Adult Reconstruction  Department of [facility]

## 2018-04-17 ENCOUNTER — EVALUATION (OUTPATIENT)
Dept: OCCUPATIONAL THERAPY | Facility: CLINIC | Age: 78
End: 2018-04-17
Payer: MEDICARE

## 2018-04-17 DIAGNOSIS — S52.502P CLOSED TRAUMATIC DISPLACED FRACTURE OF DISTAL END OF LEFT RADIUS WITH MALUNION: ICD-10-CM

## 2018-04-17 PROCEDURE — G8984 CARRY CURRENT STATUS: HCPCS

## 2018-04-17 PROCEDURE — G8985 CARRY GOAL STATUS: HCPCS

## 2018-04-17 PROCEDURE — 97165 OT EVAL LOW COMPLEX 30 MIN: CPT

## 2018-04-17 NOTE — PROGRESS NOTES
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  He was injured on , while carrying a heavy hose, patient fell  PMHx includes: HTN  Medications: See list in chart  Patient lives in a house with his son and his sister -in-law   He has a dog  He works as an oil    Some of his interests include yard work  FUNCTIONAL SUMMARY:   Saintclair Leek is independent in basic self care skills  Sintia Tucker has difficulty with lifting, cooking, cleaning and work tasks  FOTO score is 41% with a 59% limit in function  Saintclair Leek presents with:  Minimal to no edema noted in the wrist,   Decreased ROM in the  left wrist,   Decreased strength in the  left wrist    No reports of sensory problems,   Increase pain rated at 8/10 level   Difficulty with ADLs and work tasks  Patient would benefit from Occupational Therapy to address these impairments in order to return to His prior level of function  Understanding of Dx/Px/POC: good   Prognosis: good    Goals  Short Term Goals:   to be completed in 6-8 weeks  Decrease edema of left wrist through manual lymphatic drainage massage, tubigrip stockinette, coban wrap and /or kinesiotape ,  Increase ROM of L wrist to WNLs, through the use of heat modalities, manual therapy with Graston techniques, PROM, joint mobilizations, AROM exercises, flexion taping and or splinting as needed ,   Increase U/E/ wrist to 5/5 and hand strength left =75% of unaffected side     Decrease pain to 0-2/10, through the use of heat/cold modalities, manual therapy, kinesiotape and exercise    Long Term Goals: To be completed in 8-10 weeks  Ability to perform lifting, carrying, cooking,cleaning tasks and work tasks with minimal to no discomfort  Patient demonstrates good carryover of HEP  Minimal to no pain complaints  0-2/10  Full ROM of L wrist   Improved U/E / wrist strength to 5/5 and hand strength  left =75% of unaffected side   :      Plan  Patient would benefit from: skilled OT  Planned modality interventions: ultrasound and thermotherapy: hydrocollator packs  Planned therapy interventions: joint mobilization, manual therapy, massage, patient education, strengthening, stretching, therapeutic exercise, therapeutic activities, home exercise program, graded exercise, functional ROM exercises and fine motor coordination training  Other planned therapy interventions: kinesiotape  Frequency: 2x week  Duration in visits: 20  Duration in weeks: 12  Treatment plan discussed with: patient        Subjective Evaluation    History of Present Illness  Date of onset: 2017 (17)  Mechanism of injury: trauma  Mechanism of injury: 77y o  patient who sustained a closed traumatic displaced fracture of the distal end of left radius with malunion,on17  He was carrying a heavy hose, 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  He was seen on 17 by the doctor  Patient opted for a hematoma block with serial casting rather than having surgery    On 18 he was fitted with a bone stimulator; as after 3 months x-ray showed minimal sign of callus and bone formation  Not a recurrent problem   Quality of life: fair    Pain  Current pain ratin  At best pain rating: 3  At worst pain ratin  Location: Left wrist  Quality: throbbing  Aggravating factors: lifting  Progression: improved    Social Support    Employment status: working (delivers oil)        Objective     Active Range of Motion     Left Elbow   Forearm supination: 45 degrees   Forearm pronation: WFL    Right Elbow   Forearm supination: WFL  Forearm pronation: WFL    Left Wrist   Wrist flexion: 40 degrees with pain  Wrist extension: 5 degrees   Radial deviation: 5 degrees   Ulnar deviation: 5 degrees     Right Wrist   Normal active range of motion    Strength/Myotome Testing     Left Wrist/Hand      (2nd hand position)     Trial 1: 20    Thumb Strength  Key/Lateral Pinch     Trail 1: 8  Tip/Two-Point Pinch     Trial 1: 13  Palmar/Three-Point Pinch     Trial 1: 14    Right Wrist/Hand      (2nd hand position)     Trial 1: 75    Thumb Strength   Key/Lateral Pinch     Trial 1: 17  Tip/Two-Point Pinch     Trial 1: 6  Palmar/Three-Point Pinch     Trial 1: 7      Subjective: My wrist is 6/10 pain and I can't turn it over  Objective:  Completed initial OT evaluation- see report  Initiated treatment with double hot pack x ~5min for warm up to wrist   Manual therapy with graston technique  Assessment/Plan: Continue Occupational Therapy ~2x/week to decrease pain and edema and improve ROM, strength and function      Precautions: None    Daily Treatment Diary

## 2018-04-19 ENCOUNTER — OFFICE VISIT (OUTPATIENT)
Dept: OCCUPATIONAL THERAPY | Facility: CLINIC | Age: 78
End: 2018-04-19
Payer: MEDICARE

## 2018-04-19 DIAGNOSIS — S52.502P CLOSED TRAUMATIC DISPLACED FRACTURE OF DISTAL END OF LEFT RADIUS WITH MALUNION: Primary | ICD-10-CM

## 2018-04-19 PROCEDURE — 97110 THERAPEUTIC EXERCISES: CPT

## 2018-04-19 PROCEDURE — 97140 MANUAL THERAPY 1/> REGIONS: CPT

## 2018-04-19 NOTE — PROGRESS NOTES
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 S52 502P                   Subjective: 0/10      Objective:  Initiated therapy with moist heat x ~ 5 mins  Manual therapy with graston technique and myofascial release  Had pain at wrist with handling and exercises 4/10 See treatment diary below  Precautions - traumatic fracture    Specialty Daily Treatment Diary     Manual  18       graston To forearm and wrist       myofascial                                    Exercise Diary  18       Wrist maze X 6       gripper 3 yellow x 15       dice 3 x 12       Rapper/snapper Open/close x 10       Wrist roll Flex/ext/pro/supination x 10 ea                                                                                                                                   Modalities 18       Moist heat X ~ 5 mins                               Assessment: Tolerated treatment fair  Patient would benefit from continued OT  Pain out 0/10    Plan: Continue per plan of care

## 2018-04-24 ENCOUNTER — OFFICE VISIT (OUTPATIENT)
Dept: OCCUPATIONAL THERAPY | Facility: CLINIC | Age: 78
End: 2018-04-24
Payer: MEDICARE

## 2018-04-24 DIAGNOSIS — S52.502P CLOSED TRAUMATIC DISPLACED FRACTURE OF DISTAL END OF LEFT RADIUS WITH MALUNION: Primary | ICD-10-CM

## 2018-04-24 PROCEDURE — 97110 THERAPEUTIC EXERCISES: CPT

## 2018-04-24 PROCEDURE — 97140 MANUAL THERAPY 1/> REGIONS: CPT

## 2018-04-24 NOTE — PROGRESS NOTES
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 S52 502P                Subjective: 010      Objective:  Initiated therapy with moist heat x ~ 5 mins  Manual therapy with graston technique and myofascial release  Had pain at wrist with handling and exercises 4/10 See treatment diary below  Precautions - traumatic fracture    Specialty Daily Treatment Diary     Manual  18      graston To forearm and wrist       myofascial                                    Exercise Diary  18      Wrist maze X 6 X 6       gripper 3 yellow x 15 3 yellow x 15      dice 3 x 12 3 x 12      Rapper/snapper Open/close x 10       Wrist roll Flex/ext/pro/supination x 10 ea Flex/ext/pro/supination x 12      Functional dexterity  Supination/pronation x 16                                                                                                                          Modalities 18      Moist heat X ~ 5 mins X ~ 5 mins                              Assessment: Tolerated treatment fair  Patient would benefit from continued OT  Pain out 0/10    Plan: Continue per plan of care

## 2018-04-26 ENCOUNTER — OFFICE VISIT (OUTPATIENT)
Dept: OCCUPATIONAL THERAPY | Facility: CLINIC | Age: 78
End: 2018-04-26
Payer: MEDICARE

## 2018-04-26 DIAGNOSIS — S52.502P CLOSED TRAUMATIC DISPLACED FRACTURE OF DISTAL END OF LEFT RADIUS WITH MALUNION: Primary | ICD-10-CM

## 2018-04-26 PROCEDURE — 97110 THERAPEUTIC EXERCISES: CPT

## 2018-04-26 PROCEDURE — 97140 MANUAL THERAPY 1/> REGIONS: CPT

## 2018-04-26 NOTE — PROGRESS NOTES
Daily Note     Today's date: 2018  Patient name: Jose Poe  : 1940  MRN: [id number]  Referring provider: Chika Brewer DO  Dx:   Encounter Diagnosis     ICD-10-CM    1  Closed traumatic displaced fracture of distal end of left radius with malunion S52 502P                Subjective:  "my wrist is sore" 6/10 on dorsal of wrist ulna side-        Objective:  Initiated therapy with moist heat x ~ 5 mins  Manual therapy with graston technique and gentle massage  Precautions - traumatic fracture    Specialty Daily Treatment Diary     Manual  18     graston To forearm and wrist  Forearm/wrist     myofascial                                    Exercise Diary  18     Wrist maze X 6 X 6  X 6     gripper 3 yellow x 15 3 yellow x 15 3 yellow x 15     dice 3 x 12 3 x 12 3 x 12     Rapper/snapper Open/close x 10       Wrist roll Flex/ext/pro/supination x 10 ea Flex/ext/pro/supination x 12 Flex/ext/pronation/supination x12     Functional dexterity  Supination/pronation x 16 Supination/pronation x 16     Weight well   2 cycles                                                                                                                 Modalities 18     Moist heat X ~ 5 mins X ~ 5 mins X ~ 5 mins                             Assessment: Tolerated treatment fair  Patient would benefit from continued OT  Pain out -7/10    Plan: Continue per plan of care

## 2018-05-01 ENCOUNTER — OFFICE VISIT (OUTPATIENT)
Dept: OCCUPATIONAL THERAPY | Facility: CLINIC | Age: 78
End: 2018-05-01
Payer: MEDICARE

## 2018-05-01 DIAGNOSIS — S52.502P CLOSED TRAUMATIC DISPLACED FRACTURE OF DISTAL END OF LEFT RADIUS WITH MALUNION: Primary | ICD-10-CM

## 2018-05-01 PROCEDURE — 97110 THERAPEUTIC EXERCISES: CPT

## 2018-05-01 PROCEDURE — 97140 MANUAL THERAPY 1/> REGIONS: CPT

## 2018-05-01 NOTE — PROGRESS NOTES
Daily Note     Today's date: 2018  Patient name: Zac Love  : 1940  MRN: [id number]  Referring provider: Sravan Johnson DO  Dx:   Encounter Diagnosis     ICD-10-CM    1  Closed traumatic displaced fracture of distal end of left radius with malunion S52 502P                Subjective:  0/10 " little stiff and sometimes hard to move      Objective:  Initiated therapy with moist heat x ~ 5 mins  Manual therapy with graston technique and joint mobilization- ant/post/ulna/radial glides gentle massage  Application of kinesiotape from elbow to wrist to facilitate supination  Precautions - traumatic fracture    Specialty Daily Treatment Diary     Manual  18    graston To forearm and wrist  Forearm/wrist Wrist/forearm    myofascial        Joint mobs    Ulna/radial bones                        Exercise Diary  18    Wrist maze X 6 X 6  X 6 x6    gripper 3 yellow x 15 3 yellow x 15 3 yellow x 15     dice 3 x 12 3 x 12 3 x 12 3 x 12    Rapper/snapper Open/close x 10       Wrist roll Flex/ext/pro/supination x 10 ea Flex/ext/pro/supination x 12 Flex/ext/pronation/supination x12 Flex/ext/pronation/supination x 12 each    Functional dexterity  Supination/pronation x 16 Supination/pronation x 16 Supination/pronation x 16    Weight well   2 cycles 2 cycles    hammer    Supination/pronation 2 x 12                                                                                                        Modalities 18    Moist heat X ~ 5 mins X ~ 5 mins X ~ 5 mins X ~ 5 mins                            Assessment: Tolerated treatment fair  Patient would benefit from continued OT  Pain out 0/10    Plan: Continue per plan of care

## 2018-05-03 ENCOUNTER — OFFICE VISIT (OUTPATIENT)
Dept: OCCUPATIONAL THERAPY | Facility: CLINIC | Age: 78
End: 2018-05-03
Payer: MEDICARE

## 2018-05-03 DIAGNOSIS — S52.502P CLOSED TRAUMATIC DISPLACED FRACTURE OF DISTAL END OF LEFT RADIUS WITH MALUNION: Primary | ICD-10-CM

## 2018-05-03 PROCEDURE — 97110 THERAPEUTIC EXERCISES: CPT

## 2018-05-03 PROCEDURE — 97140 MANUAL THERAPY 1/> REGIONS: CPT

## 2018-05-03 NOTE — PROGRESS NOTES
Daily Note     Today's date: 5/3/2018  Patient name: Geneva Martin  : 1940  MRN: [id number]  Referring provider: Radha Garcia DO  Dx:   Encounter Diagnosis     ICD-10-CM    1  Closed traumatic displaced fracture of distal end of left radius with malunion S52 502P                Subjective:  0/10 " little stiff and sometimes hard to move      Objective:  Initiated therapy with moist heat x ~ 5 mins  Manual therapy with graston technique and joint mobilization- ant/post/ulna/radial glides gentle massage  Application of kinesiotape from elbow to wrist to facilitate supination  HEP:  Wrist ROM exercises, gripper  Precautions - traumatic fracture, falls    Specialty Daily Treatment Diary     Manual  4/19/18 4/24/18 4/26/18 5/1/18 5/3/18   graston To forearm and wrist  Forearm/wrist Wrist/forearm Wrist/forearm   myofascial        Joint mobs    Ulna/radial bones Ulna/radial post/ ant                       Exercise Diary  4/19/18 4/24/18 4/26/18 5/1/18 5/3/18   Wrist maze X 6 X 6  X 6 x6 X 6   gripper 3 yellow x 15 3 yellow x 15 3 yellow x 15     dice 3 x 12 3 x 12 3 x 12 3 x 12 3 x 12   Rapper/snapper Open/close x 10       Wrist roll Flex/ext/pro/supination x 10 ea Flex/ext/pro/supination x 12 Flex/ext/pronation/supination x12 Flex/ext/pronation/supination x 12 each Flex/ext/pronation/supination x 12 each   Functional dexterity  Supination/pronation x 16 Supination/pronation x 16 Supination/pronation x 16    Weight well   2 cycles 2 cycles 2 cycles   hammer    Supination/pronation 2 x 12    Clothes pegs  Firm  easy       2 x 20 2nd&3rd  2x 13  4th & 5th                                                                                               Modalities 4/19/18 4/24/18 4/26/18 5/1/18 5/3/18   Moist heat X ~ 5 mins X ~ 5 mins X ~ 5 mins X ~ 5 mins X ~ 5 mins                           Assessment: Tolerated treatment fair   Patient would benefit from continued OT  Pain out 0/10    Plan: Continue per plan of care

## 2018-05-07 ENCOUNTER — OFFICE VISIT (OUTPATIENT)
Dept: OCCUPATIONAL THERAPY | Facility: CLINIC | Age: 78
End: 2018-05-07
Payer: MEDICARE

## 2018-05-07 DIAGNOSIS — S52.502P CLOSED TRAUMATIC DISPLACED FRACTURE OF DISTAL END OF LEFT RADIUS WITH MALUNION: Primary | ICD-10-CM

## 2018-05-07 PROCEDURE — 97140 MANUAL THERAPY 1/> REGIONS: CPT

## 2018-05-07 PROCEDURE — 97110 THERAPEUTIC EXERCISES: CPT

## 2018-05-07 NOTE — PROGRESS NOTES
Daily Note     Today's date: 2018  Patient name: Gio Macias  : 1940  MRN: [id number]  Referring provider: Martín Orozco DO  Dx:   Encounter Diagnosis     ICD-10-CM    1  Closed traumatic displaced fracture of distal end of left radius with malunion S52 502P         Subjective:  0/10 " little stiff and sometimes hard to move      Objective:  Initiated therapy with moist heat x ~ 5 mins  Manual therapy with graston technique and joint mobilization- ant/post/ulna/radial glides gentle massage  Application of kinesiotape from elbow to wrist to facilitate supination    Patient has pain with joint mobs of ulna  HEP:  Wrist ROM exercises, gripper  Precautions - traumatic fracture, falls    Specialty Daily Treatment Diary     Manual  5/7/18 4/24/18 4/26/18 5/1/18 5/3/18   graston To forearm and wrist  Forearm/wrist Wrist/forearm Wrist/forearm   myofascial        Joint mobs Ant/post/ulna/  radial   Ulna/radial bones Ulna/radial post/ ant                       Exercise Diary  5/7/18 4/24/18 4/26/18 5/1/18 5/3/18   Wrist maze X 6 X 6  X 6 x6 X 6   gripper 3 yellow x 15 3 yellow x 15 3 yellow x 15     dice 3 x 12 3 x 12 3 x 12 3 x 12 3 x 12   Rapper/snapper          Wrist roll Flex/ext/pro/supination x 15 ea Flex/ext/pro/supination x 12 Flex/ext/pronation/supination x12 Flex/ext/pronation/supination x 12 each Flex/ext/pronation/supination x 12 each   Functional dexterity  Supination/pronation x 16 Supination/pronation x 16 Supination/pronation x 16    Weight well 4 cycles  2 cycles 2 cycles 2 cycles   hammer Supination/pronation x 2 x 15   Supination/pronation 2 x 12    Clothes pegs  Firm  easy 2 x 202nd&3rd  [id number]&5th      2 x 20 2nd&3rd  2x 13  4th & 5th                                                                                               Modalities 5/7/18 4/24/18 4/26/18 5/1/18 5/3/18   Moist heat X ~ 5 mins X ~ 5 mins X ~ 5 mins X ~ 5 mins X ~ 5 mins                           Assessment: Tolerated treatment fair  Patient would benefit from continued OT  Pain out 0/10    Plan: Continue per plan of care

## 2018-05-09 ENCOUNTER — OFFICE VISIT (OUTPATIENT)
Dept: OCCUPATIONAL THERAPY | Facility: CLINIC | Age: 78
End: 2018-05-09
Payer: MEDICARE

## 2018-05-09 DIAGNOSIS — S52.502P CLOSED TRAUMATIC DISPLACED FRACTURE OF DISTAL END OF LEFT RADIUS WITH MALUNION: Primary | ICD-10-CM

## 2018-05-09 PROCEDURE — 97110 THERAPEUTIC EXERCISES: CPT

## 2018-05-09 PROCEDURE — 97140 MANUAL THERAPY 1/> REGIONS: CPT

## 2018-05-09 NOTE — PROGRESS NOTES
Daily Note     Today's date: 2018  Patient name: Dorothy Post  : 1940  MRN: [id number]  Referring provider: Eladia Harris DO  Dx:   Encounter Diagnosis     ICD-10-CM    1  Closed traumatic displaced fracture of distal end of left radius with malunion S52 502P         Subjective:  0/10 " little stiff and sometimes hard to move      Objective:  Initiated therapy with moist heat x ~ 5 mins  Manual therapy with graston technique and joint mobilization- ant/post/ulna/radial glides gentle massage  Application of kinesiotape from elbow to wrist to facilitate supination    Patient has pain with joint mobs of ulna- feel shifting  HEP:  Wrist ROM exercises, gripper  Precautions - traumatic fracture, falls    Specialty Daily Treatment Diary     Manual  5/7/18 5/9/18 4/26/18 5/1/18 5/3/18   graston To forearm and wrist To forearm and wrist  Forearm/wrist Wrist/forearm Wrist/forearm   myofascial        Joint mobs Ant/post/ulna/  radial Ant/post/ulna/radial  Ulna/radial bones Ulna/radial post/ ant                       Exercise Diary  5/7/18 5/9/18 4/26/18 5/1/18 5/3/18   Wrist maze X 6 X 6  X 6 x6 X 6   gripper 3 yellow x 15 3 yellow x 15 3 yellow x 15     dice 3 x 12 3 x 12 3 x 12 3 x 12 3 x 12   Rapper/snapper          Wrist roll Flex/ext/pro/supination x 15 ea Flex/ext/pro/supination x 12 each Flex/ext/pronation/supination x12 Flex/ext/pronation/supination x 12 each Flex/ext/pronation/supination x 12 each   Functional dexterity  Supination/pronation x 16 Supination/pronation x 16 Supination/pronation x 16    Weight well 4 cycles 4 cycles 2 cycles 2 cycles 2 cycles   hammer Supination/pronation x 2 x 15 Supination/pronation 2 x 15  Supination/pronation 2 x 12    Clothes pegs  Firm  easy 2 x 202nd&3rd  [id number]&5th 2x 20-2nd&3rd  2 x 13 4th&5th digits     2 x 20 2nd&3rd  2x 13  4th & 5th                                                                                               Modalities 18 4/26/18 5/1/18 5/3/18   Moist heat X ~ 5 mins X ~ 5 mins X ~ 5 mins X ~ 5 mins X ~ 5 mins                           Assessment: Tolerated treatment fair  Patient would benefit from continued OT  Pain out 2/10 dull pain around the wrist  Plan: Continue per plan of care

## 2018-05-14 ENCOUNTER — OFFICE VISIT (OUTPATIENT)
Dept: OCCUPATIONAL THERAPY | Facility: CLINIC | Age: 78
End: 2018-05-14
Payer: MEDICARE

## 2018-05-14 DIAGNOSIS — S52.502P CLOSED TRAUMATIC DISPLACED FRACTURE OF DISTAL END OF LEFT RADIUS WITH MALUNION: Primary | ICD-10-CM

## 2018-05-14 PROCEDURE — 97110 THERAPEUTIC EXERCISES: CPT

## 2018-05-14 PROCEDURE — 97140 MANUAL THERAPY 1/> REGIONS: CPT

## 2018-05-14 NOTE — PROGRESS NOTES
Daily Note     Today's date: 2018  Patient name: Luis Stokes  : 1940  MRN: [id number]  Referring provider: Candace Craig DO  Dx:   Encounter Diagnosis     ICD-10-CM    1  Closed traumatic displaced fracture of distal end of left radius with malunion S52 502P         Subjective:  0/10 " little stiff and sometimes hard to move      Objective:  Initiated therapy with moist heat x ~ 5 mins  Manual therapy with graston technique and joint mobilization- ant/post/ulna/radial glides gentle massage  Application of kinesiotape from elbow to wrist to facilitate supination    Patient has pain with joint mobs of ulna- feel shifting- continue  HEP:  Wrist ROM exercises, gripper  Precautions - traumatic fracture, falls    Specialty Daily Treatment Diary     Manual  5/7/18 5/9/18 5/14/18 5/1/18 5/3/18   graston and soft tissue To forearm and wrist To forearm and wrist  Forearm/wrist Wrist/forearm Wrist/forearm   myofascial        Joint mobs Ant/post/ulna/  radial Ant/post/ulna/radial Ant/post/ulna/radial/medial/lateral glides Ulna/radial bones Ulna/radial post/ ant                       Exercise Diary  5/7/18 5/9/18 5/14/18 5/1/18 5/3/18   Wrist maze X 6 X 6  X 6 x6 X 6   gripper 3 yellow x 15 3 yellow x 15 Y2 x 15 (power)     dice 3 x 12 3 x 12 3 x 12 3 x 12 3 x 12   Rapper/snapper          Wrist roll Flex/ext/pro/supination x 15 ea Flex/ext/pro/supination x 12 each Flex/ext/pronation/supination x12 Flex/ext/pronation/supination x 12 each Flex/ext/pronation/supination x 12 each   Functional dexterity  Supination/pronation x 16 Supination/pronation x 16 Supination/pronation x 16    Weight well 4 cycles 4 cycles 4 cycles 2 cycles 2 cycles   hammer Supination/pronation x 2 x 15 Supination/pronation 2 x 15 Sup(2sec hold)/pro 2x 15 Supination/pronation 2 x 12    Clothes pegs  Firm  easy 2 x 202nd&3rd  [id number]&5th 2x 20-2nd&3rd  2 x 13 4th&5th digits [id number]&3rd  2 x 134th&5th  digits    2 x 20 2nd&3rd  2x 13  4th & 5th                                                                                               Modalities 5/7/18 5/9/18 5/14/18 5/1/18 5/3/18   Moist heat X ~ 5 mins X ~ 5 mins X ~ 5 mins X ~ 5 mins X ~ 5 mins                           Assessment: Tolerated treatment fair  Patient would benefit from continued OT  Pain out 0/10 dull pain around the wrist  Plan: Continue per plan of care

## 2018-05-16 ENCOUNTER — OFFICE VISIT (OUTPATIENT)
Dept: OCCUPATIONAL THERAPY | Facility: CLINIC | Age: 78
End: 2018-05-16
Payer: MEDICARE

## 2018-05-16 DIAGNOSIS — S52.502P CLOSED TRAUMATIC DISPLACED FRACTURE OF DISTAL END OF LEFT RADIUS WITH MALUNION: Primary | ICD-10-CM

## 2018-05-16 PROCEDURE — G8984 CARRY CURRENT STATUS: HCPCS

## 2018-05-16 PROCEDURE — G8985 CARRY GOAL STATUS: HCPCS

## 2018-05-16 PROCEDURE — 97110 THERAPEUTIC EXERCISES: CPT

## 2018-05-16 PROCEDURE — 97140 MANUAL THERAPY 1/> REGIONS: CPT

## 2018-05-16 NOTE — PROGRESS NOTES
OT RE-EVALUATION    Today's date: 2018  Patient name: Elena Castaneda  : 1940  MRN: [id number]  Referring provider: Polo Renee DO  Dx:   Encounter Diagnosis     ICD-10-CM    1  Closed traumatic displaced fracture of distal end of left radius with malunion S52 502P        Assessment  Impairments: abnormal coordination, abnormal muscle tone, abnormal or restricted ROM, impaired physical strength, pain with function and weight-bearing intolerance    Assessment details:   Re-eval Summary  Elena Castaneda was initially seen on  and has since been seen ~2x/week  Treatment has included moist heat, manual therapy with myofascial soft tissue work and Graston techniques, Joint mobs and PROM, therapeutic exercises and kinesiotape  Elena Castaneda has shown good improvement in ROM and strength; but still limited  He experienced pain with joint mobilization; primarily at the wrist on the ulna side and has difficulty with supination  Pain level is decreasing  He is doing more ADLs at home  He could benefit from a few more weeks of therapy to further improve ROM, strength and function  FUNCTIONAL SUMMARY:   Elena Castaneda is independent in basic self care skills  Ann Chandra has difficulty with lifting, cooking, cleaning and work tasks  FOTO score is 47% with a 53% limit in function  Elena Castaneda presents with:  Minimal to no edema noted in the wrist,   Decreased ROM in the  left wrist,   Decreased strength in the  left wrist    No reports of sensory problems,   Increase pain rated at 6/10 level   Difficulty with ADLs and work tasks  Patient would benefit from Occupational Therapy to address these impairments in order to return to His prior level of function  Understanding of Dx/Px/POC: good   Prognosis: good    Goals  Short Term Goals:   to be completed in 6-8 weeks     Decrease edema of left wrist through manual lymphatic drainage massage, tubigrip stockinette, coban wrap and /or kinesiotape  --Partially Met  Increase ROM of L wrist to WNLs, through the use of heat modalities, manual therapy with Graston techniques, PROM, joint mobilizations, AROM exercises, flexion taping and or splinting as needed  -- NOT MET  Increase U/E/ wrist to 5/5 and hand strength left =75% of unaffected side  __ NOT MET  Decrease pain to 0-2/10, through the use of heat/cold modalities, manual therapy, kinesiotape and exercise - Partially met    Long Term Goals: To be completed in 8-10 weeks  Ability to perform lifting, carrying, cooking,cleaning tasks and work tasks with minimal to no discomfort  Patient demonstrates good carryover of HEP  Minimal to no pain complaints  0-2/10-- Partially Met  Full ROM of L wrist -- Not Met  Improved U/E / wrist strength to 5/5 and hand strength  left =75% of unaffected side  --Not Met  :      Plan  Patient would benefit from: skilled OT  Planned modality interventions: ultrasound and thermotherapy: hydrocollator packs  Planned therapy interventions: joint mobilization, manual therapy, massage, patient education, strengthening, stretching, therapeutic exercise, therapeutic activities, home exercise program, graded exercise, functional ROM exercises and fine motor coordination training  Other planned therapy interventions: kinesiotape  Frequency: 2x week  Duration in visits: 16  Duration in weeks: 8  Treatment plan discussed with: patient        Subjective Evaluation    History of Present Illness  Date of onset: 11/2/2017 (11/2/17)  Mechanism of injury: trauma  Mechanism of injury: 77y o  patient who sustained a closed traumatic displaced fracture of the distal end of left radius with malunion,on11/2/17  He was carrying a heavy hose, 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  He was seen on 11/7/17 by the doctor  Patient opted for a hematoma block with serial casting rather than having surgery    On 2/21/18 he was fitted with a bone stimulator; as after 3 months x-ray showed minimal sign of callus and bone formation  Not a recurrent problem   Quality of life: fair    Pain  Current pain ratin  At best pain ratin  At worst pain ratin  Location: Left wrist  Quality: dull ache  Aggravating factors: lifting  Progression: improved    Social Support    Employment status: working (delivers oil)  Treatments  Current treatment: occupational therapy  Patient Goals  Patient goals for therapy: increased strength, increased motion, decreased pain and return to sport/leisure activities          Objective     Active Range of Motion     Left Elbow   Forearm supination: 30 degrees   Forearm pronation: WFL    Right Elbow   Forearm supination: WFL  Forearm pronation: WFL    Left Wrist   Wrist flexion: 55 degrees with pain  Wrist extension: 15 degrees   Radial deviation: 10 degrees   Ulnar deviation: 10 degrees     Right Wrist   Normal active range of motion    Strength/Myotome Testing     Left Wrist/Hand      (2nd hand position)     Trial 1: 30    Thumb Strength  Key/Lateral Pinch     Trail 1: 10  Tip/Two-Point Pinch     Trial 1: 9  Palmar/Three-Point Pinch     Trial 1: 7    Right Wrist/Hand      (2nd hand position)     Trial 1: 75    Thumb Strength   Key/Lateral Pinch     Trial 1: 17  Tip/Two-Point Pinch     Trial 1: 13  Palmar/Three-Point Pinch     Trial 1: 14       Subjective:  0/10 " little stiff and sometimes hard to move      Objective:  Initiated therapy with moist heat x ~ 5 mins  Manual therapy with graston technique and joint mobilization- ant/post/ulna/radial glides gentle massage  Application of kinesiotape from elbow to wrist to facilitate supination    Patient has pain with joint mobs of ulna- feel shifting- continue  HEP:  Wrist ROM exercises, gripper         Precautions: Fall, standard      Specialty Daily Treatment Diary     Manual  5/7/18 5/9/18 5/14/18 5/16/18 5/3/18   graston and soft tissue To forearm and wrist To forearm and wrist  Forearm/wrist Wrist/forearm Wrist/forearm   myofascial        Joint mobs Ant/post/ulna/  radial Ant/post/ulna/radial Ant/post/ulna/radial/medial/lateral glides Ulna/radial bones Ulna/radial post/ ant                       Exercise Diary  5/7/18 5/9/18 5/14/18 5/16/18 5/3/18   Wrist maze X 6 X 6  X 6 x6 X 6   gripper 3 yellow x 15 3 yellow x 15 Y2 x 15 (power) Y2 x 20  (power)    dice 3 x 12 3 x 12 3 x 12 3 x 12 3 x 12   Rapper/snapper          Wrist roll Flex/ext/pro/supination x 15 ea Flex/ext/pro/supination x 12 each Flex/ext/pronation/supination x12 Flex/ext/pronation/supination x 12 each Flex/ext/pronation/supination x 12 each   Functional dexterity  Supination/pronation x 16 Supination/pronation x 16       Weight well 4 cycles 4 cycles 4 cycles 6 cycles 2 cycles   hammer Supination/pronation x 2 x 15 Supination/pronation 2 x 15 Sup(2sec hold)/pro 2x 15 Supination/pronation 2 x 15    Clothes pegs  Firm  easy 2 x 202nd&3rd  [id number]&5th 2x 20-2nd&3rd  2 x 13 4th&5th digits [id number]&3rd  2 x 134th&5th  digits [id number]&3rd  [id number]&5th  digits   2 x 20 2nd&3rd  2x 13  4th & 5th                                                                                               Modalities 5/7/18 5/9/18 5/14/18 5/16/18 5/3/18   Moist heat X ~ 5 mins X ~ 5 mins X ~ 5 mins X ~ 5 mins X ~ 5 mins                           Assessment: Tolerated treatment fair  Patient would benefit from continued OT  Pain out 0/10 dull pain around the wrist  Plan: Continue per plan of care

## 2018-05-21 ENCOUNTER — APPOINTMENT (OUTPATIENT)
Dept: OCCUPATIONAL THERAPY | Facility: CLINIC | Age: 78
End: 2018-05-21
Payer: MEDICARE

## 2018-05-23 ENCOUNTER — OFFICE VISIT (OUTPATIENT)
Dept: OCCUPATIONAL THERAPY | Facility: CLINIC | Age: 78
End: 2018-05-23
Payer: MEDICARE

## 2018-05-23 ENCOUNTER — OFFICE VISIT (OUTPATIENT)
Dept: OBGYN CLINIC | Facility: CLINIC | Age: 78
End: 2018-05-23
Payer: MEDICARE

## 2018-05-23 VITALS
HEART RATE: 57 BPM | HEIGHT: 69 IN | BODY MASS INDEX: 25.36 KG/M2 | DIASTOLIC BLOOD PRESSURE: 57 MMHG | SYSTOLIC BLOOD PRESSURE: 152 MMHG | WEIGHT: 171.2 LBS

## 2018-05-23 DIAGNOSIS — S52.502P CLOSED TRAUMATIC DISPLACED FRACTURE OF DISTAL END OF LEFT RADIUS WITH MALUNION: Primary | ICD-10-CM

## 2018-05-23 PROCEDURE — 97110 THERAPEUTIC EXERCISES: CPT

## 2018-05-23 PROCEDURE — 97140 MANUAL THERAPY 1/> REGIONS: CPT

## 2018-05-23 PROCEDURE — 99213 OFFICE O/P EST LOW 20 MIN: CPT | Performed by: ORTHOPAEDIC SURGERY

## 2018-05-23 NOTE — PROGRESS NOTES
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       Patient is here for clinical follow-up regarding his left distal radius fracture that was treated conservatively  Patient has demonstrated that his x-rays have gone on to union with ulnar positive variance and mild malunion of the distal radius with shortening  He has been progressing well with occupational therapy and via there reports he has been making gains  He presents today with a mild loss and wrist extension as well as and supination however he does have decreased supination on the contralateral side  At this point he seems to be satisfied with his progress and his overall ability to carry out his demands of employment in [employer] with this left hand  He denies activity-related pain in his wrist and only reports an intermittent discomfort with pulling himself up into his truck  Other than that he is pleased that he was able to get this result without surgery  At this point he may finish out his occupational therapy sessions until he and his therapist see fit as a feel he is reaching point of maximum improvement in this clinical setting  The cock-up wrist splint as needed if it helps provide comfort at work to prevent fatigue  He may continue to wear I will be happy to see him back on an as-needed basis or for any other orthopedic injury he may have  Questions were addressed  The patient may call the office at anytime if any questions or concerns should arise  Subjective:  Patient is here for clinical follow-up regarding his left distal radius fracture after closed treatment  Patient ID: Jaiden Evans is a 76 y o  male  HPI  Patient is here for clinical follow-up to evaluate the progress of occupational therapy for his left distal radius malunion  He states that overall he feels he is doing well and continued to make progress    He has very little pain in his wrist and only has some discomfort when pulling of self up into his truck  He is pleased with the outcome of his closed treatment thus far and feels that he is reaching the end of the benefit of his occupational therapy regiment  Review of Systems   Constitutional: Positive for activity change  HENT: Negative  Eyes: Negative  Respiratory: Negative  Cardiovascular: Negative  Gastrointestinal: Negative  Endocrine: Negative  Musculoskeletal: Negative  Skin: Negative  Neurological: Negative  Psychiatric/Behavioral: Negative  Past Medical History:   Diagnosis Date    Diabetes mellitus ([facility] [address] )        History reviewed  No pertinent surgical history  History reviewed  No pertinent family history  Social History     Occupational History    Not on file       Social History Main Topics    Smoking status: Former Smoker    Smokeless tobacco: Former User    Alcohol use No    Drug use: No    Sexual activity: Not on file         Current Outpatient Prescriptions:     allopurinol (ZYLOPRIM) 100 mg tablet, Take 100 mg by mouth 2 (two) times a day, Disp: , Rfl:     ALPRAZolam (XANAX) 0 5 mg tablet, , Disp: , Rfl: 0    azithromycin (ZITHROMAX) 250 mg tablet, , Disp: , Rfl: 0    brimonidine-timolol (COMBIGAN) 0 2-0 5 %, Administer 1 drop to both eyes every 12 (twelve) hours, Disp: , Rfl:     furosemide (LASIX) 40 mg tablet, Take 40 mg by mouth daily, Disp: , Rfl:     glyBURIDE (DIABETA) 5 mg tablet, Take 10 mg by mouth 2 (two) times a day with meals, Disp: , Rfl:     irbesartan (AVAPRO) 300 mg tablet, Take 300 mg by mouth daily at bedtime, Disp: , Rfl:     latanoprost (XALATAN) 0 005 % ophthalmic solution, 1 drop daily at bedtime, Disp: , Rfl:     pioglitazone (ACTOS) 45 mg tablet, Take 45 mg by mouth daily, Disp: , Rfl:     propranolol-hydrochlorothiazide (INDERIDE) 80-25 MG per tablet, Take 1 tablet by mouth daily, Disp: , Rfl:     repaglinide (PRANDIN) 0 5 mg tablet, Take 1 mg by mouth 2 (two) times a day before meals, Disp: , Rfl:     verapamil (CALAN) 80 mg tablet, , Disp: , Rfl: 0    Allergies   Allergen Reactions    Sulfa Antibiotics     Sulfur        Objective:  Vitals:    05/23/18 1540   BP: 152/57   Pulse: 57       Body mass index is 25 28 kg/m²  Right Knee Exam     Other   Other tests: effusion present      Left Hand Exam     Tenderness   The patient is experiencing no tenderness  Other   Erythema: absent  Scars: absent  Sensation: normal  Pulse: present    Comments:  Gross inspection there is no gross deformity appreciated at the wrist and overall alignment is maintained  On range of motion testing compared to the contralateral side there is mild loss and wrist extension compared to contralateral side  There is also mild loss and supination compared to the contralateral side  Neurovascular intact  On gentle palpation of the dorsal wrist tendons gliding over the can be appreciated  No crepitance at the fracture site appreciated  Neurovascular intact          Observations     Right Knee   Positive for effusion  Physical Exam   Constitutional: He is oriented to person, place, and time  He appears well-developed  HENT:   Head: Atraumatic  Eyes: EOM are normal    Neck: Neck supple  Cardiovascular: Normal rate  Pulmonary/Chest: Effort normal    Abdominal: Soft  Musculoskeletal:        Right knee: He exhibits effusion  See orthopedic exam   Neurological: He is alert and oriented to person, place, and time  Skin: Skin is warm and dry  Psychiatric: He has a normal mood and affect  Nursing note and vitals reviewed  Monika WAGGONER    Division of Adult Reconstruction  Department of [facility]

## 2018-05-23 NOTE — PROGRESS NOTES
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 S52 502P         Subjective:  0/10 " little stiff and sometimes hard to move      Objective:  Initiated therapy with moist heat x ~ 5 mins  Manual therapy with graston technique and joint mobilization- ant/post/ulna/radial glides gentle massage  Application of kinesiotape from elbow to wrist to facilitate supination    Patient has pain with joint mobs of ulna- feel shifting- continue  HEP:  Wrist ROM exercises, gripper  Precautions - traumatic fracture, falls    Specialty Daily Treatment Diary     Manual  18   graston and soft tissue To forearm and wrist To forearm and wrist  Forearm/wrist Wrist/forearm Wrist/forearm  GT4 for rocking bwt R/U   myofascial        Joint mobs Ant/post/ulna/  radial Ant/post/ulna/radial Ant/post/ulna/radial/medial/lateral glides Ulna/radial bones Ulna/radial post/ ant                       Exercise Diary  18   Wrist maze X 6 X 6  X 6 x6 X 6   gripper 3 yellow x 15 3 yellow x 15 Y2 x 15 (power) Y2 x 20  (power)    dice 3 x 12 3 x 12 3 x 12 3 x 12 3 x 12   Rapper/snapper          Wrist roll Flex/ext/pro/supination x 15 ea Flex/ext/pro/supination x 12 each Flex/ext/pronation/supination x12 Flex/ext/pronation/supination x 12 each Flex/ext/pronation/supination x 20 each   Functional dexterity  Supination/pronation x 16 Supination/pronation x 16       Weight well 4 cycles 4 cycles 4 cycles 6 cycles 5 cycles   hammer Supination/pronation x 2 x 15 Supination/pronation 2 x 15 Sup(2sec hold)/pro 2x 15 Supination/pronation 2 x 15 Supination/pronation 2 x 15   Clothes pegs  Firm  easy 2 x 202nd&3rd  [id number]&5th 2x 20-2nd&3rd  2 x 13 4th&5th digits [id number]&3rd  2 x 134th&5th  digits [id number]&3rd  [id number]&5th  digits   2 x 20 2nd&3rd  2x 13  4th & 5th                                                                                               Modalities 5/7/18 5/9/18 5/14/18 5/16/18 5/23/18   Moist heat X ~ 5 mins X ~ 5 mins X ~ 5 mins X ~ 5 mins X ~ 5 mins                               Assessment: Tolerated treatment fair  Patient would benefit from continued OT  Pain out 4/10 around the wrist  Plan: Continue per plan of care

## 2018-05-29 ENCOUNTER — OFFICE VISIT (OUTPATIENT)
Dept: OCCUPATIONAL THERAPY | Facility: CLINIC | Age: 78
End: 2018-05-29
Payer: MEDICARE

## 2018-05-29 DIAGNOSIS — S52.502P CLOSED TRAUMATIC DISPLACED FRACTURE OF DISTAL END OF LEFT RADIUS WITH MALUNION: Primary | ICD-10-CM

## 2018-05-29 PROCEDURE — 97110 THERAPEUTIC EXERCISES: CPT

## 2018-05-29 PROCEDURE — 97140 MANUAL THERAPY 1/> REGIONS: CPT

## 2018-05-29 NOTE — PROGRESS NOTES
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 S52 502P         Subjective:  0/10 " little stiff and sometimes hard to move      Objective:  Initiated therapy with moist heat x ~ 5 mins  Manual therapy with graston technique and joint mobilization- ant/post/ulna/radial glides gentle massage  Application of kinesiotape from elbow to wrist to facilitate supination    Patient has pain with joint mobs of ulna- feel shifting- continue  HEP:  Wrist ROM exercises, gripper  Precautions - traumatic fracture, falls    Specialty Daily Treatment Diary     Manual  18   graston and soft tissue To forearm and wrist To forearm and wrist  Forearm/wrist Wrist/forearm Wrist/forearm  GT4 for rocking bwt R/U   myofascial        Joint mobs Ant/post/ulna/  radial Ant/post/ulna/radial Ant/post/ulna/radial/medial/lateral glides Ulna/radial bones Ulna/radial post/ ant                       Exercise Diary  18   Wrist maze X 6 X 6  X 6 x6 X 6   gripper Y2 x 20 3 yellow x 15 Y2 x 15 (power) Y2 x 20  (power)    dice 3 x 12 3 x 12 3 x 12 3 x 12 3 x 12   Rapper/snapper          Wrist roll Flex/ext/pro/supination x 15 ea Flex/ext/pro/supination x 12 each Flex/ext/pronation/supination x12 Flex/ext/pronation/supination x 12 each Flex/ext/pronation/supination x 20 each   Functional dexterity  Supination/pronation x 16 Supination/pronation x 16       Weight well 4 cycles 4 cycles 4 cycles 6 cycles 5 cycles   hammer Supination/pronation x 2 x 15 Supination/pronation 2 x 15 Sup(2sec hold)/pro 2x 15 Supination/pronation 2 x 15 Supination/pronation 2 x 15   Clothes pegs  Firm  easy 2 x nd&3rd  [id number]&5th 2x 20-2nd&3rd  2 x 13 4th&5th digits [id number]&3rd  2 x 134th&5th  digits [id number]&3rd  [id number]&5th  digits   2 x 20 2nd&3rd  2x 13  4th & 5th   Hand gym 2 x 12                                                                                           Modalities 5/29/18 5/9/18 5/14/18 5/16/18 5/23/18   Moist heat X ~ 5 mins X ~ 5 mins X ~ 5 mins X ~ 5 mins X ~ 5 mins                               Assessment: Tolerated treatment fair  Patient would benefit from continued OT  Pain out 0/10 around the wrist  Plan: Continue per plan of care

## 2018-05-31 ENCOUNTER — OFFICE VISIT (OUTPATIENT)
Dept: OCCUPATIONAL THERAPY | Facility: CLINIC | Age: 78
End: 2018-05-31
Payer: MEDICARE

## 2018-05-31 DIAGNOSIS — S52.502P CLOSED TRAUMATIC DISPLACED FRACTURE OF DISTAL END OF LEFT RADIUS WITH MALUNION: Primary | ICD-10-CM

## 2018-05-31 PROCEDURE — 97110 THERAPEUTIC EXERCISES: CPT

## 2018-05-31 PROCEDURE — 97140 MANUAL THERAPY 1/> REGIONS: CPT

## 2018-05-31 NOTE — PROGRESS NOTES
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 S52 502P         Subjective:  0/10 " little stiff and sometimes hard to move      Objective:  Initiated therapy with moist heat x ~ 5 mins  Manual therapy with graston technique and joint mobilization- ant/post/ulna/radial glides gentle massage    Patient has pain with joint mobs of ulna- feel shifting- continue  HEP:  Wrist ROM exercises, gripper  Precautions - traumatic fracture, falls    Specialty Daily Treatment Diary     Manual  18   graston and soft tissue To forearm and wrist To forearm and wrist  Forearm/wrist Wrist/forearm Wrist/forearm  GT4 for rocking bwt R/U   myofascial        Joint mobs Ant/post/ulna/  radial Ant/post/ulna/radial Ant/post/ulna/radial/medial/lateral glides Ulna/radial bones Ulna/radial post/ ant                       Exercise Diary  18   Wrist maze X 6 X 6  X 6 x6 X 6   gripper Y2 x 20 Y3 x 20 Y2 x 15 (power) Y2 x 20  (power)    dice 3 x 12 3 x 12 3 x 12 3 x 12 3 x 12   Rapper/snapper          Wrist roll Flex/ext/pro/supination x 15 ea Flex/ext/pro/supination x 12 each Flex/ext/pronation/supination x12 Flex/ext/pronation/supination x 12 each Flex/ext/pronation/supination x 20 each   Functional dexterity     Supination/pronation x 16       Weight well 4 cycles 45cycles 4 cycles 6 cycles 5 cycles   hammer Supination/pronation x 2 x 15 Supination/pronation 2 x 15 Sup(2sec hold)/pro 2x 15 Supination/pronation 2 x 15 Supination/pronation 2 x 15   Clothes pegs  Firm  easy 2 x 202nd&3rd  [id number]&5th 2x 20-2nd&3rd  2 x 13 4th&5th digits [id number]&3rd  2 x 134th&5th  digits [id number]&3rd  [id number]&5th  digits   2 x 20 2nd&3rd  2x 13  4th & 5th   Hand gym 2 x 12 Modalities 5/29/18 5/31/18 5/14/18 5/16/18 5/23/18   Moist heat X ~ 5 mins X ~ 5 mins X ~ 5 mins X ~ 5 mins X ~ 5 mins                               Assessment: Tolerated treatment fair  Patient would benefit from continued OT  Pain out 0/10 around the wrist  Plan: Continue per plan of care

## 2018-06-04 ENCOUNTER — OFFICE VISIT (OUTPATIENT)
Dept: OCCUPATIONAL THERAPY | Facility: CLINIC | Age: 78
End: 2018-06-04
Payer: MEDICARE

## 2018-06-04 DIAGNOSIS — S52.502P CLOSED TRAUMATIC DISPLACED FRACTURE OF DISTAL END OF LEFT RADIUS WITH MALUNION: Primary | ICD-10-CM

## 2018-06-04 PROCEDURE — 97110 THERAPEUTIC EXERCISES: CPT

## 2018-06-04 PROCEDURE — 97140 MANUAL THERAPY 1/> REGIONS: CPT

## 2018-06-04 NOTE — PROGRESS NOTES
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 S52 502P         Subjective:  0/10 " little stiff and sometimes hard to move      Objective:  Initiated therapy with moist heat x ~ 5 mins  Manual therapy with graston technique and joint mobilization- ant/post/ulna/radial glides gentle massage    Patient has pain with joint mobs of ulna- feel shifting- continue  HEP:  Wrist ROM exercises, gripper  Precautions - traumatic fracture, falls    Specialty Daily Treatment Diary     Manual  18   graston and soft tissue To forearm and wrist To forearm and wrist  Forearm/wrist Wrist/forearm Wrist/forearm  GT4 for rocking bwt R/U   myofascial        Joint mobs Ant/post/ulna/  radial Ant/post/ulna/radial Ant/post/ulna/radial/medial/lateral glides Ulna/radial bones Ulna/radial post/ ant                       Exercise Diary  18   Wrist maze X 6 X 6  X 6 x6 X 6   gripper Y2 x 20 Y3 x 20 Y2 x 20 (power) Y2 x 20  (power)    dice 3 x 12 3 x 12 3 x 12 3 x 12 3 x 12   Rapper/snapper          Wrist roll Flex/ext/pro/supination x 15 ea Flex/ext/pro/supination x 12 each Flex/ext/pronation/supination x12 Flex/ext/pronation/supination x 12 each Flex/ext/pronation/supination x 20 each   Functional dexterity     Supination/pronation x 16       Weight well 4 cycles 45cycles 4 cycles 6 cycles 5 cycles   hammer Supination/pronation x 2 x 15 Supination/pronation 2 x 15 Sup(2sec hold)/pro 2x 20 Supination/pronation 2 x 15 Supination/pronation 2 x 15   Clothes pegs  Firm  easy 2 x 202nd&3rd  [id number]&5th 2x 20-2nd&3rd  2 x 13 4th&5th digits [id number],3rd&  4th digits   [id number]&3rd  7r238ma&5th  digits   2 x 20 2nd&3rd  2x 13  4th & 5th   Hand gym 2 x 12 Modalities 5/29/18 5/31/18 6/4/18 5/16/18 5/23/18   Moist heat X ~ 5 mins X ~ 5 mins X ~ 5 mins X ~ 5 mins X ~ 5 mins                               Assessment: Tolerated treatment fair  Patient would benefit from continued OT  Pain out 0/10 around the wrist  Plan: Continue per plan of care

## 2018-06-06 ENCOUNTER — OFFICE VISIT (OUTPATIENT)
Dept: OCCUPATIONAL THERAPY | Facility: CLINIC | Age: 78
End: 2018-06-06
Payer: MEDICARE

## 2018-06-06 DIAGNOSIS — S52.502P CLOSED TRAUMATIC DISPLACED FRACTURE OF DISTAL END OF LEFT RADIUS WITH MALUNION: Primary | ICD-10-CM

## 2018-06-06 PROCEDURE — 97140 MANUAL THERAPY 1/> REGIONS: CPT

## 2018-06-06 PROCEDURE — 97110 THERAPEUTIC EXERCISES: CPT

## 2018-06-06 NOTE — PROGRESS NOTES
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 S52 502P         Subjective:  2/10 with activities      Objective:  Initiated therapy with moist heat x ~ 5 mins  Manual therapy with graston technique and joint mobilization- ant/post/ulna/radial glides gentle massage    Patient has pain with joint mobs of ulna- feel shifting- continue  HEP:  Wrist ROM exercises, gripper, adjusted pre made extension/flexion splint for patient  Precautions - traumatic fracture, falls    Specialty Daily Treatment Diary     Manual  18   graston and soft tissue To forearm and wrist To forearm and wrist  Forearm/wrist Wrist/forearm Wrist/forearm  GT4 for rocking bwt R/U   myofascial        Joint mobs Ant/post/ulna/  radial Ant/post/ulna/radial Ant/post/ulna/radial/medial/lateral glides Ulna/radial bones Ulna/radial post/ ant                       Exercise Diary  18   Wrist maze X 6 X 6  X 6 x6 X 6   gripper Y2 x 20 Y3 x 20 Y2 x 20 (power) Y2 x 20  (power)    dice 3 x 12 3 x 12 3 x 12 3 x 12 3 x 12   Rapper/snapper          Wrist roll Flex/ext/pro/supination x 15 ea Flex/ext/pro/supination x 12 each Flex/ext/pronation/supination x12    Flex/ext/pronation/supination x 20 each   Functional dexterity     Supination/pronation x 16       Weight well 4 cycles 45cycles 4 cycles  5 cycles   hammer Supination/pronation x 2 x 15 Supination/pronation 2 x 15 Sup(2sec hold)/pro 2x 20 Supination/pronation 2 x 15 Supination/pronation 2 x 15   Clothes pegs  Firm  easy 2 x 202nd&3rd  [id number]&5th 2x 20-2nd&3rd  2 x 13 4th&5th digits [id number],3rd&  4th digits   4i449xl&3rd  [id number]&5th  digits   2 x 20 2nd&3rd  2x 13  4th & 5th   Hand gym 2 x 12 Modalities 5/29/18 5/31/18 6/4/18 6/6/18 5/23/18   Moist heat X ~ 5 mins X ~ 5 mins X ~ 5 mins X ~ 5 mins X ~ 5 mins                               Assessment: Tolerated treatment fair  Patient would benefit from continued OT  Pain out 0/10 around the wrist  Plan: Continue per plan of care

## 2018-06-11 ENCOUNTER — OFFICE VISIT (OUTPATIENT)
Dept: OCCUPATIONAL THERAPY | Facility: CLINIC | Age: 78
End: 2018-06-11
Payer: MEDICARE

## 2018-06-11 DIAGNOSIS — S52.502P CLOSED TRAUMATIC DISPLACED FRACTURE OF DISTAL END OF LEFT RADIUS WITH MALUNION: Primary | ICD-10-CM

## 2018-06-11 PROCEDURE — 97110 THERAPEUTIC EXERCISES: CPT

## 2018-06-11 NOTE — PROGRESS NOTES
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 S52 502P         Subjective:  0/10      Objective:  Initiated therapy with moist heat x ~ 5 mins  Manual therapy with graston technique and joint mobilization- ant/post/ulna/radial glides gentle massage    Patient has pain with joint mobs of ulna- feel shifting- continue  HEP:  Wrist ROM exercises, gripper, adjusted pre made extension/flexion splint for patient  Manual done by OT student Brad Cruz  Precautions - traumatic fracture, falls    Specialty Daily Treatment Diary     Manual  18   graston and soft tissue To forearm and wrist To forearm and wrist  Forearm/wrist Wrist/forearm Wrist/forearm  GT4 for rocking bwt R/U   myofascial        Joint mobs Ant/post/ulna/  radial Ant/post/ulna/radial Ant/post/ulna/radial/medial/lateral glides Ulna/radial bones Ulna/radial post/ ant                       Exercise Diary  18   Wrist maze X 6 X 6  X 6 x6 X 6   gripper Y2 x 20 Y3 x 20 Y2 x 20 (power) Y2 x 20  (power) Y3 x 20    dice 3 x 12 3 x 12 3 x 12 3 x 12 3 x 12   Power bar       Sup/pro 2 x 15 each   Wrist roll Flex/ext/pro/supination x 15 ea Flex/ext/pro/supination x 12 each Flex/ext/pronation/supination x12         Functional dexterity     Supination/pronation x 16       Weight well 4 cycles 45cycles 4 cycles  7 cycles   hammer Supination/pronation x 2 x 15 Supination/pronation 2 x 15 Sup(2sec hold)/pro 2x 20 Supination/pronation 2 x 15 Supination/pronation 2 x 15   Clothes pegs  Firm  easy 2 x 202nd&3rd  [id number]&5th 2x 20-2nd&3rd  2 x 13 4th&5th digits [id number],3rd&  4th digits   [id number]&3rd  [id number]&5th  digits   2 x 20 2nd&3rd  2x 13  4th & 5th   Hand gym 2 x 12    Flex 2lb x 15  Dev 2lb x 15   Wrist weight                                                                                     Modalities 5/29/18 5/31/18 6/4/18 6/6/18 6/11/18   Moist heat X ~ 5 mins X ~ 5 mins X ~ 5 mins X ~ 5 mins X ~ 5 mins                               Assessment: Tolerated treatment fair  Patient would benefit from continued OT  Pain out 0/10 around the wrist  Plan: Continue per plan of care

## 2018-06-13 ENCOUNTER — OFFICE VISIT (OUTPATIENT)
Dept: OCCUPATIONAL THERAPY | Facility: CLINIC | Age: 78
End: 2018-06-13
Payer: MEDICARE

## 2018-06-13 DIAGNOSIS — S52.502P CLOSED TRAUMATIC DISPLACED FRACTURE OF DISTAL END OF LEFT RADIUS WITH MALUNION: Primary | ICD-10-CM

## 2018-06-13 PROCEDURE — 97140 MANUAL THERAPY 1/> REGIONS: CPT

## 2018-06-13 PROCEDURE — 97110 THERAPEUTIC EXERCISES: CPT

## 2018-06-13 NOTE — PROGRESS NOTES
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 S52 502P         Subjective:  0/10      Objective:  Initiated therapy with moist heat x ~ 5 mins  Manual therapy with graston technique and joint mobilization- ant/post/ulna/radial glides gentle massage    Patient has pain with joint mobs of ulna- feel shifting- continue  HEP:  Wrist ROM exercises, gripper, adjusted pre made extension/flexion splint for patient    Precautions - traumatic fracture, falls    Specialty Daily Treatment Diary     Manual  18   graston and soft tissue To forearm and wrist To forearm and wrist  Forearm/wrist Wrist/forearm Wrist/forearm  GT4 for rocking bwt R/U   myofascial Gentle stretch       Joint mobs Ant/post/ulna/  radial Ant/post/ulna/radial Ant/post/ulna/radial/medial/lateral glides Ulna/radial bones Ulna/radial post/ ant                       Exercise Diary  18   Wrist maze X 6 X 6  X 6 x6 X 6   gripper Y2 x 20 Y3 x 20 Y2 x 20 (power) Y2 x 20  (power) Y3 x 20    dice 3 x 12 3 x 12 3 x 12 3 x 12 3 x 12   Power bar       Sup/pro 2 x 15 each   Wrist roll Flex/ext/pro/supination x 15 ea Flex/ext/pro/supination x 12 each Flex/ext/pronation/supination x12         Functional dexterity     Supination/pronation x 16       Weight well 4 cycles 45cycles 4 cycles  7 cycles   hammer Supination/pronation x 2 x 15 Supination/pronation 2 x 15 Sup(2sec hold)/pro 2x 20 Supination/pronation 2 x 15 Supination/pronation 2 x 15   Clothes pegs  Firm  easy 2 x 202nd&3rd  [id number]&5th 2x 20-2nd&3rd  2 x 13 4th&5th digits [id number],3rd&  4th digits   [id number]&3rd  [id number]&5th  digits   2 x 20 2nd&3rd  2x 13  4th & 5th   Hand gym     Flex 2lb x 15  Dev 2lb x 15   Wrist weight Modalities 6/13/18 5/31/18 6/4/18 6/6/18 6/11/18   Moist heat X ~ 5 mins X ~ 5 mins X ~ 5 mins X ~ 5 mins X ~ 5 mins                               Assessment: Tolerated treatment fair  Patient would benefit from continued OT  Pain out 0/10 around the wrist  Plan: Continue per plan of care

## 2018-06-26 ENCOUNTER — OFFICE VISIT (OUTPATIENT)
Dept: OCCUPATIONAL THERAPY | Facility: CLINIC | Age: 78
End: 2018-06-26
Payer: MEDICARE

## 2018-06-26 DIAGNOSIS — S52.502P CLOSED TRAUMATIC DISPLACED FRACTURE OF DISTAL END OF LEFT RADIUS WITH MALUNION: Primary | ICD-10-CM

## 2018-06-26 PROCEDURE — 97140 MANUAL THERAPY 1/> REGIONS: CPT

## 2018-06-26 PROCEDURE — G8984 CARRY CURRENT STATUS: HCPCS

## 2018-06-26 PROCEDURE — G8985 CARRY GOAL STATUS: HCPCS

## 2018-06-26 PROCEDURE — 97110 THERAPEUTIC EXERCISES: CPT

## 2018-06-26 NOTE — PROGRESS NOTES
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 S52 502P         Subjective:  0/10      Objective:  Initiated therapy with moist heat x ~ 5 mins  Manual therapy with graston technique and joint mobilization- ant/post/ulna/radial glides gentle massage  Patient has pain with joint mobs of ulna- feel shifting- continue  HEP:  Wrist ROM exercises, gripper, adjusted pre made extension/flexion splint for patient  Therapist assisting patient in ordering of comfort cool supination splint    Precautions - traumatic fracture, falls    Specialty Daily Treatment Diary     Manual  18   graston and soft tissue To forearm and wrist To forearm and wrist  Forearm/wrist Wrist/forearm Wrist/forearm  GT4 for rocking bwt R/U   myofascial Gentle stretch       Joint mobs Ant/post/ulna/  radial Ant/post/ulna/radial Ant/post/ulna/radial/medial/lateral glides Ulna/radial bones Ulna/radial post/ ant                       Exercise Diary  18   Wrist maze X 6 X 6  X 6 x6 X 6   gripper Y2 x 20 Y4 x 20 Y2 x 20 (power) Y2 x 20  (power) Y3 x 20    dice 3 x 12 3 x 12 3 x 12 3 x 12 3 x 12   Power bar       Sup/pro 2 x 15 each   Wrist roll Flex/ext/pro/supination x 15 ea    Flex/ext/pronation/supination x12         Functional dexterity     Supination/pronation x 16       Weight well 4 cycles 8 cycles 4 cycles  7 cycles   hammer Supination/pronation x 2 x 15 Supination/pronation 2 x 15 with 2 sec hold in supination Sup(2sec hold)/pro 2x 20 Supination/pronation 2 x 15 Supination/pronation 2 x 15   Clothes pegs  Firm  easy 2 x 202nd&3rd  [id number]&5th 2x 20-2nd&3rd  2 x 13 4th&5th digits [id number],3rd&  4th digits   [id number]&3rd  [id number]&5th  digits   2 x 20 2nd&3rd  2x 13 4th & 5th   Hand gym     Flex 2lb x 15  Dev 2lb x 15   Wrist weight                                                                                     Modalities 6/13/18 5/31/18 6/4/18 6/6/18 6/11/18   Moist heat X ~ 5 mins X ~ 5 mins X ~ 5 mins X ~ 5 mins X ~ 5 mins                               Assessment: Tolerated treatment fair  Patient would benefit from continued OT  Pain out 0/10 around the wrist  Plan: Continue per plan of care

## 2018-06-28 ENCOUNTER — APPOINTMENT (OUTPATIENT)
Dept: OCCUPATIONAL THERAPY | Facility: CLINIC | Age: 78
End: 2018-06-28
Payer: MEDICARE

## 2018-07-03 ENCOUNTER — OFFICE VISIT (OUTPATIENT)
Dept: OCCUPATIONAL THERAPY | Facility: CLINIC | Age: 78
End: 2018-07-03
Payer: MEDICARE

## 2018-07-03 DIAGNOSIS — S52.502P CLOSED TRAUMATIC DISPLACED FRACTURE OF DISTAL END OF LEFT RADIUS WITH MALUNION: Primary | ICD-10-CM

## 2018-07-03 PROCEDURE — 97110 THERAPEUTIC EXERCISES: CPT

## 2018-07-03 PROCEDURE — 97140 MANUAL THERAPY 1/> REGIONS: CPT

## 2018-07-03 NOTE — PROGRESS NOTES
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 of left radius with malunion S52 502P         Subjective:  0/10      Objective:  Initiated therapy with moist heat x ~ 5 mins  Manual therapy with graston technique and joint mobilization- ant/post/ulna/radial glides gentle massage    Patient has pain with joint mobs of ulna- feel shifting- continue  HEP:  Wrist ROM exercises, gripper, adjusted pre made extension/flexion splint for patient  Patient will wear comfort cool supination splint for a few hours daily and at nights  Precautions - traumatic fracture, falls    Specialty Daily Treatment Diary     Manual  6/13/18 6/26/18 7/3/18 6/6/18 6/11/18   graston and soft tissue To forearm and wrist To forearm and wrist  Forearm/wrist Wrist/forearm Wrist/forearm  GT4 for rocking bwt R/U   myofascial Gentle stretch       Joint mobs Ant/post/ulna/  radial Ant/post/ulna/radial Ant/post/ulna/radial/medial/lateral glides Ulna/radial bones Ulna/radial post/ ant                       Exercise Diary  6/13/18 6/26/18 7/3/18 6/6/18 6/11/18   Wrist maze X 6 X 6  X 6 x6 X 6   gripper Y2 x 20 Y4 x 20 Y3 x 20 (power) Y2 x 20  (power) Y3 x 20    dice 3 x 12 3 x 12 3 x 12 3 x 12 3 x 12   Power bar       Sup/pro 2 x 15 each   Wrist roll Flex/ext/pro/supination x 15 ea               Functional dexterity     Supination/pronation x 16       Weight well 4 cycles 8 cycles 4 cycles  7 cycles   hammer Supination/pronation x 2 x 15 Supination/pronation 2 x 15 with 2 sec hold in supination Sup(2sec hold)/pro 2x 20 Supination/pronation 2 x 15 Supination/pronation 2 x 15   Clothes pegs  Firm  easy 2 x 202nd&3rd  [id number]&5th 2x 20-2nd&3rd  2 x 13 4th&5th digits [id number],3rd&  4th digits   [id number]&3rd  [id number]&5th  digits   2 x 20 2nd&3rd  2x 13  4th & 5th   Hand gym Flex 2lb x 15  Dev 2lb x 15   Wrist weight    Flex 2lb x 15  Dev 2lb x 25                                                                                 Modalities 6/13/18 6/25/18 7/3/18 6/6/18 6/11/18   Moist heat X ~ 5 mins X ~ 5 mins X ~ 5 mins X ~ 5 mins X ~ 5 mins                               Assessment: Tolerated treatment fair  Patient would benefit from continued OT  Pain out 0/10 around the wrist  Plan: Continue per plan of care

## 2018-07-09 ENCOUNTER — OFFICE VISIT (OUTPATIENT)
Dept: OCCUPATIONAL THERAPY | Facility: CLINIC | Age: 78
End: 2018-07-09
Payer: MEDICARE

## 2018-07-09 DIAGNOSIS — S52.502P CLOSED TRAUMATIC DISPLACED FRACTURE OF DISTAL END OF LEFT RADIUS WITH MALUNION: Primary | ICD-10-CM

## 2018-07-09 PROCEDURE — 97140 MANUAL THERAPY 1/> REGIONS: CPT

## 2018-07-09 PROCEDURE — 97110 THERAPEUTIC EXERCISES: CPT

## 2018-07-09 NOTE — PROGRESS NOTES
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 of left radius with malunion S52 502P        Assessment  Impairments: abnormal coordination, abnormal muscle tone, abnormal or restricted ROM, impaired physical strength, pain with function and weight-bearing intolerance    Assessment details:   Re-eval Summary  Merlene Villegas was initially seen on  and has since been seen ~2x/week  Treatment has included moist heat, manual therapy with myofascial soft tissue work and Graston techniques, Joint mobs and PROM, therapeutic exercises and kinesiotape  Merlene Villegas has shown good improvement in ROM and strength; but still limited; primarily in supination at 30 degrees  He is wearing a comfort cool soft supination splint- too new to see results at this time  He experienced pain with joint mobilization; primarily at the wrist on the ulna side and has difficulty with supination  Pain level is decreasing  He is doing more ADLs at home  He could benefit from a few more weeks of therapy to further improve ROM, strength and function  FUNCTIONAL SUMMARY:   Merlene Villegas is independent in basic self care skills  [facility] has difficulty with lifting, cooking, cleaning and work tasks  FOTO score is 47% with a 53% limit in function  Merlene Villegas presents with:  Minimal to no edema noted in the wrist,   Decreased ROM in the  left wrist,   Decreased strength in the  left wrist    No reports of sensory problems,   Increase pain rated at 6/10 level   Difficulty with ADLs and work tasks  Patient would benefit from Occupational Therapy to address these impairments in order to return to His prior level of function       Understanding of Dx/Px/POC: good   Prognosis: good    Goals  Short Term Goals:   to be completed in 4-6weeks  Decrease edema of left wrist through manual lymphatic drainage massage, tubigrip stockinette, coban wrap and /or kinesiotape  -MET  Increase ROM of L wrist to WNLs, through the use of heat modalities, manual therapy with Graston techniques, PROM, joint mobilizations, AROM exercises, flexion taping and or splinting as needed  -- Partially MET  Increase U/E/ wrist to 5/5 and hand strength left =75% of unaffected side  __Partially MET  Decrease pain to 0-2/10, through the use of heat/cold modalities, manual therapy, kinesiotape and exercise - Partially met    Long Term Goals: To be completed in 6-8weeks  Ability to perform lifting, carrying, cooking,cleaning tasks and work tasks with minimal to no discomfort  Patient demonstrates good carryover of HEP  Minimal to no pain complaints  0-2/10-- Partially Met  Full ROM of L wrist -- Partially Met  Improved U/E / wrist strength to 5/5 and hand strength  left =75% of unaffected side  --Partially Met  :      Plan  Patient would benefit from: skilled OT  Planned modality interventions: ultrasound and thermotherapy: hydrocollator packs  Planned therapy interventions: joint mobilization, manual therapy, massage, patient education, strengthening, stretching, therapeutic exercise, therapeutic activities, home exercise program, graded exercise, functional ROM exercises and fine motor coordination training  Other planned therapy interventions: kinesiotape  Frequency: 2x week  Duration in visits: 16  Duration in weeks: 8  Treatment plan discussed with: patient        Subjective Evaluation    History of Present Illness  Date of onset: 11/2/2017 (11/2/17)  Mechanism of injury: trauma  Mechanism of injury: 77y o  patient who sustained a closed traumatic displaced fracture of the distal end of left radius with malunion,on11/2/17  He was carrying a heavy hose, 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  He was seen on 11/7/17 by the doctor    Patient opted for a hematoma block with serial casting rather than having surgery  On 18 he was fitted with a bone stimulator; as after 3 months x-ray showed minimal sign of callus and bone formation  Not a recurrent problem   Quality of life: fair    Pain  Current pain ratin  At best pain ratin  At worst pain ratin  Location: Left wrist  Quality: dull ache  Aggravating factors: lifting  Progression: improved    Social Support    Employment status: working (delivers oil)  Treatments  Current treatment: occupational therapy  Patient Goals  Patient goals for therapy: increased strength, increased motion, decreased pain and return to sport/leisure activities  Patient goal: use hand better        Objective     Active Range of Motion     Left Elbow   Forearm supination: 30 degrees   Forearm pronation: WFL    Right Elbow   Forearm supination: WFL  Forearm pronation: WFL    Left Wrist   Wrist flexion: 60 degrees with pain  Wrist extension: 20 degrees   Radial deviation: 10 degrees   Ulnar deviation: 15 degrees     Right Wrist   Normal active range of motion    Strength/Myotome Testing     Left Wrist/Hand      (2nd hand position)     Trial 1: 30    Thumb Strength  Key/Lateral Pinch     Trail 1: 14  Tip/Two-Point Pinch     Trial 1: 9  Palmar/Three-Point Pinch     Trial 1: 7    Right Wrist/Hand      (2nd hand position)     Trial 1: 75    Thumb Strength   Key/Lateral Pinch     Trial 1: 17  Tip/Two-Point Pinch     Trial 1: 13  Palmar/Three-Point Pinch     Trial 1: 14       Subjective:  0/10 " little stiff and sometimes hard to move      Objective:  Initiated therapy with moist heat x ~ 5 mins  Manual therapy with graston technique and joint mobilization- ant/post/ulna/radial glides gentle massage  Application of kinesiotape from elbow to wrist to facilitate supination    Patient has pain with joint mobs of ulna- feel shifting- continue  HEP:  Wrist ROM exercises, gripper         Precautions: Fall, standard      Specialty Daily Treatment Diary   Manual  6/13/18 6/26/18 7/3/18 7/9/18 6/11/18   graston and soft tissue To forearm and wrist To forearm and wrist  Forearm/wrist Wrist/forearm Wrist/forearm  GT4 for rocking bwt R/U   myofascial Gentle stretch       Joint mobs Ant/post/ulna/  radial Ant/post/ulna/radial Ant/post/ulna/radial/medial/lateral glides Ulna/radial bones Ulna/radial post/ ant                       Exercise Diary  6/13/18 6/26/18 7/3/18 7/9/18 6/11/18   Wrist maze X 6 X 6  X 6 x6 X 6   gripper Y2 x 20 Y4 x 20 Y3 x 20 (power) Y3 x 20  (power) Y3 x 20    dice 3 x 12 3 x 12 3 x 12 3 x 12 3 x 12   Power bar      Supination/pronation x 12 Sup/pro 2 x 15 each   Wrist roll Flex/ext/pro/supination x 15 ea               Functional dexterity     Supination/pronation x 16       Weight well 4 cycles 8 cycles 4 cycles 6 cycles 7 cycles   hammer Supination/pronation x 2 x 15 Supination/pronation 2 x 15 with 2 sec hold in supination Sup(2sec hold)/pro 2x 20 Supination/pronation 2 x 15 Supination/pronation 2 x 15   Clothes pegs  Firm  easy 2 x 202nd&3rd  [id number]&5th 2x 20-2nd&3rd  2 x 13 4th&5th digits [id number],3rd&  4th digits   [id number]&3rd  0q172st&5th  digits   2 x 20 2nd&3rd  2x 13  4th & 5th   Hand gym     Flex 2lb x 15  Dev 2lb x 15   Wrist weight    Flex 2lb x 15  Dev 2lb x 25 Flex 2lb x 20  Dev 2lb x 20                                                                                Modalities 6/13/18 6/25/18 7/3/18 7/9/18 6/11/18   Moist heat X ~ 5 mins X ~ 5 mins X ~ 5 mins X ~ 5 mins X ~ 5 mins                               Assessment: Tolerated treatment fair  Patient would benefit from a few more weeks to OT to improve ROM and strength  Pain out 0/10 dull pain around the wrist  Plan: Continue per plan of care

## 2018-07-11 ENCOUNTER — OFFICE VISIT (OUTPATIENT)
Dept: OCCUPATIONAL THERAPY | Facility: CLINIC | Age: 78
End: 2018-07-11
Payer: MEDICARE

## 2018-07-11 DIAGNOSIS — S52.502P CLOSED TRAUMATIC DISPLACED FRACTURE OF DISTAL END OF LEFT RADIUS WITH MALUNION: Primary | ICD-10-CM

## 2018-07-11 PROCEDURE — 97140 MANUAL THERAPY 1/> REGIONS: CPT

## 2018-07-11 PROCEDURE — 97110 THERAPEUTIC EXERCISES: CPT

## 2018-07-11 NOTE — PROGRESS NOTES
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 S52 502P         Subjective:  0/10      Objective:  Initiated therapy with moist heat x ~ 5 mins  Manual therapy with graston technique and joint mobilization- ant/post/ulna/radial glides gentle massage  Patient has pain with joint mobs of ulna- feel shifting- continue  Ended with application of kinesiotape in supination    HEP:  Wrist ROM exercises, gripper, adjusted pre made extension/flexion splint for patient  Patient will wear comfort cool supination splint for a few hours daily and at nights  Precautions - traumatic fracture, falls    Specialty Daily Treatment Diary   Manual  6/13/18 6/26/18 7/3/18 7/9/18 7/11/18   graston and soft tissue To forearm and wrist To forearm and wrist  Forearm/wrist Wrist/forearm Wrist/forearm  GT4 for rocking bwt R/U   myofascial Gentle stretch       Joint mobs Ant/post/ulna/  radial Ant/post/ulna/radial Ant/post/ulna/radial/medial/lateral glides Ulna/radial bones Ulna/radial post/ ant                       Exercise Diary  6/13/18 6/26/18 7/3/18 7/9/18 7/11/18   Wrist maze X 6 X 6  X 6 x6 X 10   gripper Y2 x 20 Y4 x 20 Y3 x 20 (power) Y3 x 20  (power) Y3 x 20    dice 3 x 12 3 x 12 3 x 12 3 x 12    Power bar      Supination/pronation x 12 Sup/pro 2 x20 15 each   Wrist roll Flex/ext/pro/supination x 15 ea               Functional dexterity     Supination/pronation x 16       Weight well 4 cycles 8 cycles 4 cycles 6 cycles 10 cycles   hammer Supination/pronation x 2 x 15 Supination/pronation 2 x 15 with 2 sec hold in supination Sup(2sec hold)/pro 2x 20 Supination/pronation 2 x 15      Clothes pegs  Firm  easy 2 x 202nd&3rd  [id number]&5th 2x 20-2nd&3rd  2 x 13 4th&5th digits [id number],3rd&  4th digits 0s875ly&3rd  [id number]&5th  digits   2 x 20 2nd&3rd  2x 13  4th & 5th   Hand gym        Wrist weight    Flex 2lb x 15  Dev 2lb x 25 Flex 2lb x 20  Dev 2lb x 20 Flex 2lb x 20  Dev 2lb x 20                                                                               Modalities 6/13/18 6/25/18 7/3/18 7/9/18 7/11/18   Moist heat X ~ 5 mins X ~ 5 mins X ~ 5 mins X ~ 5 mins X ~ 5 mins                                 Assessment: Tolerated treatment fair  Patient would benefit from continued OT  Pain out 0/10 around the wrist  Plan: Continue per plan of care

## 2018-07-16 ENCOUNTER — OFFICE VISIT (OUTPATIENT)
Dept: OCCUPATIONAL THERAPY | Facility: CLINIC | Age: 78
End: 2018-07-16
Payer: MEDICARE

## 2018-07-16 DIAGNOSIS — S52.502P CLOSED TRAUMATIC DISPLACED FRACTURE OF DISTAL END OF LEFT RADIUS WITH MALUNION: Primary | ICD-10-CM

## 2018-07-16 PROCEDURE — 97110 THERAPEUTIC EXERCISES: CPT

## 2018-07-16 PROCEDURE — 97140 MANUAL THERAPY 1/> REGIONS: CPT

## 2018-07-16 NOTE — PROGRESS NOTES
Daily Note     Today's date: 2018  Patient name: Akash Kumar  : 1940  MRN: [id number]  Referring provider: Hope Solis DO  Dx:   Encounter Diagnosis     ICD-10-CM    1  Closed traumatic displaced fracture of distal end of left radius with malunion S52 502P         Subjective:  0/10      Objective:  Initiated therapy with moist heat x ~ 5 mins  Manual therapy with graston technique and joint mobilization- ant/post/ulna/radial glides gentle massage    Patient has pain with joint mobs of ulna- feel shifting- continue   HEP:  Wrist ROM exercises, gripper, adjusted pre made extension/flexion splint for patient  Patient will wear comfort cool supination splint for a few hours daily and at nights  Precautions - traumatic fracture, falls    Specialty Daily Treatment Diary   Manual  7/16/18 6/26/18 7/3/18 7/9/18 7/11/18   graston and soft tissue To forearm and wrist To forearm and wrist  Forearm/wrist Wrist/forearm Wrist/forearm  GT4 for rocking bwt R/U   myofascial Gentle stretch       Joint mobs Ant/post/ulna/  radial Ant/post/ulna/radial Ant/post/ulna/radial/medial/lateral glides Ulna/radial bones Ulna/radial post/ ant                       Exercise Diary  7/16/18 6/26/18 7/3/18 7/9/18 7/11/18   Wrist maze X 6 X 6  X 6 x6 X 10   gripper Y2 x 20 Y4 x 20 Y3 x 20 (power) Y3 x 20  (power) Y3 x 20    dice  3 x 12 3 x 12 3 x 12    Power bar Supination  Pronation 2 x 12     Supination/pronation x 12 Sup/pro 2 x20 15 each   Wrist roll Flex/ext/pro/supination x 15 ea               Functional dexterity     Supination/pronation x 16       Weight well 4 cycles 8 cycles 4 cycles 6 cycles 10 cycles   hammer Supination/pronation x 2 x 15 Supination/pronation 2 x 15 with 2 sec hold in supination Sup(2sec hold)/pro 2x 20 Supination/pronation 2 x 15      Clothes pegs  Firm  easy 2 x 202nd&3rd  [id number]&5th 2x 20-2nd&3rd  2 x 13 4th&5th digits [id number],3rd&  4th digits   [id number]&3rd  [id number]&5th  digits   2 x 20 2nd&3rd  2x 13  4th & 5th   Hand gym        Wrist weight    Flex 2lb x 15  Dev 2lb x 25 Flex 2lb x 20  Dev 2lb x 20 Flex 2lb x 20  Dev 2lb x 20                                                                               Modalities 6/13/18 6/25/18 7/3/18 7/9/18 7/11/18   Moist heat X ~ 5 mins X ~ 5 mins X ~ 5 mins X ~ 5 mins X ~ 5 mins                         Assessment: Tolerated treatment fair  Patient would benefit from continued OT  Pain out 0/10 around the wrist  Plan: Continue per plan of care

## 2018-07-18 ENCOUNTER — OFFICE VISIT (OUTPATIENT)
Dept: OCCUPATIONAL THERAPY | Facility: CLINIC | Age: 78
End: 2018-07-18
Payer: MEDICARE

## 2018-07-18 DIAGNOSIS — S52.502P CLOSED TRAUMATIC DISPLACED FRACTURE OF DISTAL END OF LEFT RADIUS WITH MALUNION: Primary | ICD-10-CM

## 2018-07-18 PROCEDURE — 97140 MANUAL THERAPY 1/> REGIONS: CPT

## 2018-07-18 PROCEDURE — G8985 CARRY GOAL STATUS: HCPCS

## 2018-07-18 PROCEDURE — G8986 CARRY D/C STATUS: HCPCS

## 2018-07-18 PROCEDURE — 97110 THERAPEUTIC EXERCISES: CPT

## 2018-07-18 NOTE — PROGRESS NOTES
Daily Note     Today's date: 2018  Patient name: Nola Lozada  : 1940  MRN: [id number]  Referring provider: Jason Loyola DO  Dx:   Encounter Diagnosis     ICD-10-CM    1  Closed traumatic displaced fracture of distal end of left radius with malunion S52 502P         Subjective:  0/10      Objective:  Initiated therapy with moist heat x ~ 5 mins  Manual therapy with graston technique and joint mobilization- ant/post/ulna/radial glides gentle massage    Patient has pain with joint mobs of ulna- feel shifting- continue   Supination improved 35 degrees  HEP:  Wrist ROM exercises, gripper, adjusted pre made extension/flexion splint for patient  Patient will wear comfort cool supination splint for a few hours daily and at nights  Precautions - traumatic fracture, falls    Specialty Daily Treatment Diary   Manual  7/16/18 7/18/18 7/3/18 7/9/18 7/11/18   graston and soft tissue To forearm and wrist To forearm and wrist  Forearm/wrist Wrist/forearm Wrist/forearm  GT4 for rocking bwt R/U   myofascial Gentle stretch       Joint mobs Ant/post/ulna/  radial Ant/post/ulna/radial Ant/post/ulna/radial/medial/lateral glides Ulna/radial bones Ulna/radial post/ ant                       Exercise Diary  7/16/18 7/18/18 7/3/18 7/9/18 7/11/18   Wrist maze X 6 X 6  X 6 x6 X 10   gripper Y2 x 20 Y4 x 40 Y3 x 20 (power) Y3 x 20  (power) Y3 x 20    dice  3 x 12 3 x 12 3 x 12    Power bar Supination  Pronation 2 x 12   Supination/pronation 2 x 15  Supination/pronation x 12 Sup/pro 2 x20 15 each   Wrist roll Flex/ext/pro/supination x 15 ea               Functional dexterity     Supination/pronation x 16       Weight well 4 cycles 8 cycles 4 cycles 6 cycles 10 cycles   hammer Supination/pronation x 2 x 15    Sup(2sec hold)/pro 2x 20 Supination/pronation 2 x 15      Clothes pegs  Firm  easy 2 x 202nd&3rd  [id number]&5th 2x 20-2nd&3rd  2 x 13 4th&5th digits [id number],3rd&  4th digits   [id number]&3rd  [id number]&5th  digits   2 x 20 2nd&3rd  2x 13  4th & 5th   Hand gym        Wrist weight   Flex 2lb x 15  Dev 2lb x 25 Flex 2lb x 15  Dev 2lb x 25 Flex 2lb x 20  Dev 2lb x 20 Flex 2lb x 20  Dev 2lb x 20                                                                               Modalities 7/16/18 7/18/18 7/3/18 7/9/18 7/11/18   Moist heat X ~ 5 mins X ~ 5 mins X ~ 5 mins X ~ 5 mins X ~ 5 mins                         Assessment: Tolerated treatment fair  Patient would benefit from continued OT  Pain out 0/10 around the wrist  Plan: Continue per plan of care  OT DISCHARGE  Patient was last seen on 7/18 and called 7/23 to inform staff that he wish to self discharge  Patient has made gains towards his goals and has improved his supination to 35 degrees  He report overall improvement in his daily activities , however he continue to be limited due to his decreased supination  He wears a comfort cool soft supination splint  He was encouraged to continue his HEP to further improve strength and ROM

## 2018-07-23 ENCOUNTER — APPOINTMENT (OUTPATIENT)
Dept: OCCUPATIONAL THERAPY | Facility: CLINIC | Age: 78
End: 2018-07-23
Payer: MEDICARE

## 2018-07-25 ENCOUNTER — APPOINTMENT (OUTPATIENT)
Dept: OCCUPATIONAL THERAPY | Facility: CLINIC | Age: 78
End: 2018-07-25
Payer: MEDICARE

## 2018-07-30 ENCOUNTER — APPOINTMENT (OUTPATIENT)
Dept: OCCUPATIONAL THERAPY | Facility: CLINIC | Age: 78
End: 2018-07-30
Payer: MEDICARE

## 2018-12-17 ENCOUNTER — TRANSCRIBE ORDERS (OUTPATIENT)
Dept: ADMINISTRATIVE | Facility: HOSPITAL | Age: 78
End: 2018-12-17

## 2018-12-17 ENCOUNTER — OFFICE VISIT (OUTPATIENT)
Dept: LAB | Facility: HOSPITAL | Age: 78
End: 2018-12-17
Attending: FAMILY MEDICINE
Payer: MEDICARE

## 2018-12-17 DIAGNOSIS — I49.9 VENTRICULAR ARRHYTHMIA: Primary | ICD-10-CM

## 2018-12-17 DIAGNOSIS — I49.9 VENTRICULAR ARRHYTHMIA: ICD-10-CM

## 2018-12-17 PROCEDURE — 93005 ELECTROCARDIOGRAM TRACING: CPT

## 2018-12-18 LAB
ATRIAL RATE: 80 BPM
P AXIS: 51 DEGREES
PR INTERVAL: 166 MS
QRS AXIS: -25 DEGREES
QRSD INTERVAL: 98 MS
QT INTERVAL: 386 MS
QTC INTERVAL: 445 MS
T WAVE AXIS: 70 DEGREES
VENTRICULAR RATE: 80 BPM

## 2018-12-18 PROCEDURE — 93010 ELECTROCARDIOGRAM REPORT: CPT | Performed by: INTERNAL MEDICINE

## 2018-12-18 RX ORDER — HYDROCHLOROTHIAZIDE 25 MG/1
1 TABLET ORAL DAILY
Refills: 0 | COMMUNITY
Start: 2018-12-13 | End: 2019-01-16 | Stop reason: HOSPADM

## 2018-12-18 RX ORDER — PROPRANOLOL HYDROCHLORIDE 80 MG/1
1 TABLET ORAL DAILY
Refills: 0 | Status: ON HOLD | COMMUNITY
Start: 2018-12-13 | End: 2019-01-10

## 2018-12-18 RX ORDER — TIMOLOL MALEATE 5 MG/ML
SOLUTION/ DROPS OPHTHALMIC
Refills: 1 | COMMUNITY
Start: 2018-09-23 | End: 2019-01-16 | Stop reason: HOSPADM

## 2018-12-19 ENCOUNTER — OFFICE VISIT (OUTPATIENT)
Dept: CARDIOLOGY CLINIC | Facility: CLINIC | Age: 78
End: 2018-12-19
Payer: MEDICARE

## 2018-12-19 VITALS
HEIGHT: 69 IN | DIASTOLIC BLOOD PRESSURE: 82 MMHG | WEIGHT: 164 LBS | OXYGEN SATURATION: 94 % | SYSTOLIC BLOOD PRESSURE: 172 MMHG | HEART RATE: 82 BPM | BODY MASS INDEX: 24.29 KG/M2

## 2018-12-19 DIAGNOSIS — R00.2 PALPITATIONS: Primary | ICD-10-CM

## 2018-12-19 DIAGNOSIS — I10 ESSENTIAL HYPERTENSION: ICD-10-CM

## 2018-12-19 DIAGNOSIS — I49.3 PVC (PREMATURE VENTRICULAR CONTRACTION): ICD-10-CM

## 2018-12-19 PROCEDURE — 99205 OFFICE O/P NEW HI 60 MIN: CPT | Performed by: INTERNAL MEDICINE

## 2018-12-19 PROCEDURE — 93000 ELECTROCARDIOGRAM COMPLETE: CPT | Performed by: INTERNAL MEDICINE

## 2018-12-19 NOTE — PROGRESS NOTES
Cardiology Consultation     Luke Cline  [id number]  1940  [address] [place] [phone]    Consult for: irregular heart beat  Appreciate consult by: Saul Kaur DO      HPI: Luke Cline is a [de-identified]y o  year old male who is here for evaluation of an irregular heart beat  He feels dyspnea with moderate exertion for the past few months  Denies any chest pain  He was noted to have an irregular rhythm during recent visit with Dr Savannah Ohara but denies any palpitations, syncope or near syncope  He has bilateral LE edema for the past few years and uses furosemide daily  Had recent bloodwork by his PMD which is unavailable  He denies any previous cardiac history  He has a history of hypertension and is on multiple medications  He was previously taking propanolol but he independently stopped  Past Medical History:   Diagnosis Date    Diabetes mellitus ([facility] [address] )      Social History     Social History    Marital status:      Spouse name: N/A    Number of children: N/A    Years of education: N/A     Occupational History    Not on file  Social History Main Topics    Smoking status: Former Smoker    Smokeless tobacco: Former User    Alcohol use No    Drug use: No    Sexual activity: Not on file     Other Topics Concern    Not on file     Social History Narrative    No narrative on file      Family History   Problem Relation Age of Onset    Heart disease Mother      History reviewed  No pertinent surgical history      Current Outpatient Prescriptions:     allopurinol (ZYLOPRIM) 100 mg tablet, Take 100 mg by mouth 2 (two) times a day, Disp: , Rfl:     ALPRAZolam (XANAX) 0 5 mg tablet, , Disp: , Rfl: 0    azithromycin (ZITHROMAX) 250 mg tablet, , Disp: , Rfl: 0    brimonidine-timolol (COMBIGAN) 0 2-0 5 %, Administer 1 drop to both eyes every 12 (twelve) hours, Disp: , Rfl:     furosemide (LASIX) 40 mg tablet, Take 40 mg by mouth daily, Disp: , Rfl:     glyBURIDE (DIABETA) 5 mg tablet, Take 10 mg by mouth 2 (two) times a day with meals, Disp: , Rfl:     halobetasol (ULTRAVATE) 0 05 % cream, , Disp: , Rfl: 0    hydrochlorothiazide (HYDRODIURIL) 25 mg tablet, Take 1 tablet by mouth daily, Disp: , Rfl: 0    irbesartan (AVAPRO) 300 mg tablet, Take 300 mg by mouth daily at bedtime, Disp: , Rfl:     latanoprost (XALATAN) 0 005 % ophthalmic solution, 1 drop daily at bedtime, Disp: , Rfl:     pioglitazone (ACTOS) 45 mg tablet, Take 45 mg by mouth daily, Disp: , Rfl:     propranolol (INDERAL) 80 mg tablet, Take 1 tablet by mouth daily, Disp: , Rfl: 0    timolol (TIMOPTIC) 0 5 % ophthalmic solution, , Disp: , Rfl: 1    verapamil (CALAN) 80 mg tablet, , Disp: , Rfl: 0  Allergies   Allergen Reactions    Sulfa Antibiotics     Sulfur          Review of Systems:  Review of Systems   Constitutional: Negative for chills, fatigue and fever  HENT: Negative for congestion, nosebleeds and postnasal drip  Respiratory: Positive for shortness of breath  Negative for cough and chest tightness  Cardiovascular: Positive for leg swelling  Negative for chest pain and palpitations  Gastrointestinal: Negative for abdominal distention, abdominal pain, diarrhea, nausea and vomiting  Endocrine: Negative for polydipsia, polyphagia and polyuria  Musculoskeletal: Positive for arthralgias  Negative for gait problem and myalgias  Skin: Positive for color change  Negative for pallor and rash  Allergic/Immunologic: Negative for environmental allergies, food allergies and immunocompromised state  Neurological: Negative for dizziness, seizures, syncope and light-headedness  Hematological: Negative for adenopathy  Does not bruise/bleed easily  Psychiatric/Behavioral: Negative for dysphoric mood  The patient is not nervous/anxious          Physical Exam:  Vitals:    12/19/18 1318   BP: (!) 172/82   BP Location: Left arm Patient Position: Sitting   Cuff Size: Standard   Pulse: 82   SpO2: 94%   Weight: 74 4 kg (164 lb)   Height: 5' 9" (1 753 m)     Physical Exam   Constitutional: He is oriented to person, place, and time  He appears well-developed  No distress  HENT:   Head: Normocephalic and atraumatic  Eyes: Pupils are equal, round, and reactive to light  Conjunctivae and EOM are normal    Neck: Neck supple  No JVD present  No thyromegaly present  Cardiovascular: Normal rate, regular rhythm, normal heart sounds and intact distal pulses  Exam reveals no gallop and no friction rub  No murmur heard  Pulmonary/Chest: Effort normal and breath sounds normal    Abdominal: Soft  He exhibits no distension  There is no tenderness  Musculoskeletal: He exhibits no edema  Neurological: He is alert and oriented to person, place, and time  No cranial nerve deficit  Skin: Skin is warm and dry  No rash noted  He is not diaphoretic  No erythema  Small area of black pigmentation right ear   Psychiatric: He has a normal mood and affect  His behavior is normal  Judgment and thought content normal        Labs:  Office Visit on 12/17/2018   Component Date Value    Ventricular Rate 12/17/2018 80     Atrial Rate 12/17/2018 80     DC Interval 12/17/2018 166     QRSD Interval 12/17/2018 98     QT Interval 12/17/2018 386     QTC Interval 12/17/2018 445     P Axis 12/17/2018 51     QRS Axis 12/17/2018 -25     T Wave Axis 12/17/2018 70      Imaging: No results found  EKG (independently reviewed): NSR with PVCs    Discussion/Summary:  1  Palpitations/Irregular heart beat - ECG done today shows multiple PVCs  Will obtain holter monitor to evaluate PVC burden  Recommend restarting propranolol  Consider switching to alternate beta-blocker if large amount of PVCs noted  - will review recent blood work from Dr Margarita Major  - Will obtain 2D echocardiogram as he has PVCs, LE edema and exertional dyspnea       2  Hypertension - BP is elevated today  He should resume propanolol and will reassess BP

## 2018-12-19 NOTE — LETTER
December 19, 2018     Adrianne Martinez DO  [address] [address]    Patient: Bibi Saez   YOB: 1940   Date of Visit: 12/19/2018       Dear Dr Chance Lopez:    Thank you for referring Doris Duff to me for evaluation  Below are my notes for this consultation  If you have questions, please do not hesitate to call me  I look forward to following your patient along with you  Sincerely,        Sunita Grier DO        CC: No Recipients  Erickson Ramos DO  12/19/2018  5:28 PM  Sign at close encounter               Cardiology Consultation     Bibi Saez  [id number]  1940  [address]  [address] [place] [id number]    Consult for: irregular heart beat  Appreciate consult by: Jim Nash DO      HPI: Bibi Saez is a [de-identified]y o  year old male who is here for evaluation of an irregular heart beat  He feels dyspnea with moderate exertion for the past few months  Denies any chest pain  He was noted to have an irregular rhythm during recent visit with Dr Chance Lopez but denies any palpitations, syncope or near syncope  He has bilateral LE edema for the past few years and uses furosemide daily  Had recent bloodwork by his PMD which is unavailable  He denies any previous cardiac history  He has a history of hypertension and is on multiple medications  He was previously taking propanolol but he independently stopped  Past Medical History:   Diagnosis Date    Diabetes mellitus ([facility] [address] )      Social History     Social History    Marital status:      Spouse name: N/A    Number of children: N/A    Years of education: N/A     Occupational History    Not on file       Social History Main Topics    Smoking status: Former Smoker    Smokeless tobacco: Former User    Alcohol use No    Drug use: No    Sexual activity: Not on file     Other Topics Concern    Not on file     Social History Narrative    No narrative on file      Family History   Problem Relation Age of Onset    Heart disease Mother      History reviewed  No pertinent surgical history  Current Outpatient Prescriptions:     allopurinol (ZYLOPRIM) 100 mg tablet, Take 100 mg by mouth 2 (two) times a day, Disp: , Rfl:     ALPRAZolam (XANAX) 0 5 mg tablet, , Disp: , Rfl: 0    azithromycin (ZITHROMAX) 250 mg tablet, , Disp: , Rfl: 0    brimonidine-timolol (COMBIGAN) 0 2-0 5 %, Administer 1 drop to both eyes every 12 (twelve) hours, Disp: , Rfl:     furosemide (LASIX) 40 mg tablet, Take 40 mg by mouth daily, Disp: , Rfl:     glyBURIDE (DIABETA) 5 mg tablet, Take 10 mg by mouth 2 (two) times a day with meals, Disp: , Rfl:     halobetasol (ULTRAVATE) 0 05 % cream, , Disp: , Rfl: 0    hydrochlorothiazide (HYDRODIURIL) 25 mg tablet, Take 1 tablet by mouth daily, Disp: , Rfl: 0    irbesartan (AVAPRO) 300 mg tablet, Take 300 mg by mouth daily at bedtime, Disp: , Rfl:     latanoprost (XALATAN) 0 005 % ophthalmic solution, 1 drop daily at bedtime, Disp: , Rfl:     pioglitazone (ACTOS) 45 mg tablet, Take 45 mg by mouth daily, Disp: , Rfl:     propranolol (INDERAL) 80 mg tablet, Take 1 tablet by mouth daily, Disp: , Rfl: 0    timolol (TIMOPTIC) 0 5 % ophthalmic solution, , Disp: , Rfl: 1    verapamil (CALAN) 80 mg tablet, , Disp: , Rfl: 0  Allergies   Allergen Reactions    Sulfa Antibiotics     Sulfur          Review of Systems:  Review of Systems   Constitutional: Negative for chills, fatigue and fever  HENT: Negative for congestion, nosebleeds and postnasal drip  Respiratory: Positive for shortness of breath  Negative for cough and chest tightness  Cardiovascular: Positive for leg swelling  Negative for chest pain and palpitations  Gastrointestinal: Negative for abdominal distention, abdominal pain, diarrhea, nausea and vomiting  Endocrine: Negative for polydipsia, polyphagia and polyuria     Musculoskeletal: Positive for arthralgias  Negative for gait problem and myalgias  Skin: Positive for color change  Negative for pallor and rash  Allergic/Immunologic: Negative for environmental allergies, food allergies and immunocompromised state  Neurological: Negative for dizziness, seizures, syncope and light-headedness  Hematological: Negative for adenopathy  Does not bruise/bleed easily  Psychiatric/Behavioral: Negative for dysphoric mood  The patient is not nervous/anxious  Physical Exam:  Vitals:    12/19/18 1318   BP: (!) 172/82   BP Location: Left arm   Patient Position: Sitting   Cuff Size: Standard   Pulse: 82   SpO2: 94%   Weight: 74 4 kg (164 lb)   Height: 5' 9" (1 753 m)     Physical Exam   Constitutional: He is oriented to person, place, and time  He appears well-developed  No distress  HENT:   Head: Normocephalic and atraumatic  Eyes: Pupils are equal, round, and reactive to light  Conjunctivae and EOM are normal    Neck: Neck supple  No JVD present  No thyromegaly present  Cardiovascular: Normal rate, regular rhythm, normal heart sounds and intact distal pulses  Exam reveals no gallop and no friction rub  No murmur heard  Pulmonary/Chest: Effort normal and breath sounds normal    Abdominal: Soft  He exhibits no distension  There is no tenderness  Musculoskeletal: He exhibits no edema  Neurological: He is alert and oriented to person, place, and time  No cranial nerve deficit  Skin: Skin is warm and dry  No rash noted  He is not diaphoretic  No erythema  Small area of black pigmentation right ear   Psychiatric: He has a normal mood and affect   His behavior is normal  Judgment and thought content normal        Labs:  Office Visit on 12/17/2018   Component Date Value    Ventricular Rate 12/17/2018 80     Atrial Rate 12/17/2018 80     WV Interval 12/17/2018 166     QRSD Interval 12/17/2018 98     QT Interval 12/17/2018 386     QTC Interval 12/17/2018 445     P Axis 12/17/2018 51     QRS Axis 12/17/2018 -25     T Wave Axis 12/17/2018 70      Imaging: No results found  EKG (independently reviewed): NSR with PVCs    Discussion/Summary:  1  Palpitations/Irregular heart beat - ECG done today shows multiple PVCs  Will obtain holter monitor to evaluate PVC burden  Recommend restarting propranolol  Consider switching to alternate beta-blocker if large amount of PVCs noted  - will review recent blood work from Dr Alize Montoya  - Will obtain 2D echocardiogram as he has PVCs, LE edema and exertional dyspnea  2  Hypertension - BP is elevated today  He should resume propanolol and will reassess BP

## 2019-01-10 ENCOUNTER — HOSPITAL ENCOUNTER (INPATIENT)
Facility: HOSPITAL | Age: 79
LOS: 6 days | Discharge: HOME | DRG: 280 | End: 2019-01-16
Attending: EMERGENCY MEDICINE | Admitting: INTERNAL MEDICINE
Payer: MEDICARE

## 2019-01-10 ENCOUNTER — APPOINTMENT (EMERGENCY)
Dept: RADIOLOGY | Facility: HOSPITAL | Age: 79
DRG: 280 | End: 2019-01-10
Payer: MEDICARE

## 2019-01-10 DIAGNOSIS — N17.9 ACUTE KIDNEY INJURY (HCC): ICD-10-CM

## 2019-01-10 DIAGNOSIS — H40.9 GLAUCOMA: ICD-10-CM

## 2019-01-10 DIAGNOSIS — I10 ESSENTIAL HYPERTENSION: ICD-10-CM

## 2019-01-10 DIAGNOSIS — I21.4 NSTEMI (NON-ST ELEVATED MYOCARDIAL INFARCTION) (HCC): ICD-10-CM

## 2019-01-10 DIAGNOSIS — I50.9 NEW ONSET OF CONGESTIVE HEART FAILURE (HCC): Primary | ICD-10-CM

## 2019-01-10 DIAGNOSIS — N18.30 STAGE 3 CHRONIC KIDNEY DISEASE (HCC): ICD-10-CM

## 2019-01-10 PROBLEM — R60.0 LEG EDEMA: Status: ACTIVE | Noted: 2019-01-10

## 2019-01-10 PROBLEM — R79.89 ELEVATED BRAIN NATRIURETIC PEPTIDE (BNP) LEVEL: Status: ACTIVE | Noted: 2019-01-10

## 2019-01-10 PROBLEM — J96.01 ACUTE RESPIRATORY FAILURE WITH HYPOXIA (HCC): Status: ACTIVE | Noted: 2019-01-10

## 2019-01-10 PROBLEM — E11.9 CONTROLLED TYPE 2 DIABETES MELLITUS, WITHOUT LONG-TERM CURRENT USE OF INSULIN (HCC): Status: ACTIVE | Noted: 2019-01-10

## 2019-01-10 LAB
ALBUMIN SERPL BCP-MCNC: 3.5 G/DL (ref 3.5–5)
ALP SERPL-CCNC: 125 U/L (ref 46–116)
ALT SERPL W P-5'-P-CCNC: 26 U/L (ref 12–78)
ANION GAP SERPL CALCULATED.3IONS-SCNC: 11 MMOL/L (ref 4–13)
APTT PPP: 31 SECONDS (ref 26–38)
AST SERPL W P-5'-P-CCNC: 21 U/L (ref 5–45)
BASOPHILS # BLD AUTO: 0.02 THOUSANDS/ΜL (ref 0–0.1)
BASOPHILS NFR BLD AUTO: 0 % (ref 0–1)
BILIRUB SERPL-MCNC: 0.8 MG/DL (ref 0.2–1)
BUN SERPL-MCNC: 56 MG/DL (ref 5–25)
CALCIUM SERPL-MCNC: 8.6 MG/DL (ref 8.3–10.1)
CHLORIDE SERPL-SCNC: 102 MMOL/L (ref 100–108)
CO2 SERPL-SCNC: 26 MMOL/L (ref 21–32)
CREAT SERPL-MCNC: 2.43 MG/DL (ref 0.6–1.3)
EOSINOPHIL # BLD AUTO: 0.02 THOUSAND/ΜL (ref 0–0.61)
EOSINOPHIL NFR BLD AUTO: 0 % (ref 0–6)
ERYTHROCYTE [DISTWIDTH] IN BLOOD BY AUTOMATED COUNT: 14.2 % (ref 11.6–15.1)
GFR SERPL CREATININE-BSD FRML MDRD: 24 ML/MIN/1.73SQ M
GLUCOSE SERPL-MCNC: 188 MG/DL (ref 65–140)
GLUCOSE SERPL-MCNC: 236 MG/DL (ref 65–140)
HCT VFR BLD AUTO: 37 % (ref 36.5–49.3)
HGB BLD-MCNC: 11.6 G/DL (ref 12–17)
IMM GRANULOCYTES # BLD AUTO: 0.04 THOUSAND/UL (ref 0–0.2)
IMM GRANULOCYTES NFR BLD AUTO: 1 % (ref 0–2)
INR PPP: 1.09 (ref 0.86–1.16)
LYMPHOCYTES # BLD AUTO: 1.17 THOUSANDS/ΜL (ref 0.6–4.47)
LYMPHOCYTES NFR BLD AUTO: 16 % (ref 14–44)
MCH RBC QN AUTO: 32.4 PG (ref 26.8–34.3)
MCHC RBC AUTO-ENTMCNC: 31.4 G/DL (ref 31.4–37.4)
MCV RBC AUTO: 103 FL (ref 82–98)
MONOCYTES # BLD AUTO: 0.87 THOUSAND/ΜL (ref 0.17–1.22)
MONOCYTES NFR BLD AUTO: 12 % (ref 4–12)
NEUTROPHILS # BLD AUTO: 5.12 THOUSANDS/ΜL (ref 1.85–7.62)
NEUTS SEG NFR BLD AUTO: 71 % (ref 43–75)
NRBC BLD AUTO-RTO: 0 /100 WBCS
NT-PROBNP SERPL-MCNC: ABNORMAL PG/ML
PLATELET # BLD AUTO: 157 THOUSANDS/UL (ref 149–390)
PMV BLD AUTO: 9.7 FL (ref 8.9–12.7)
POTASSIUM SERPL-SCNC: 4.8 MMOL/L (ref 3.5–5.3)
PROT SERPL-MCNC: 6.4 G/DL (ref 6.4–8.2)
PROTHROMBIN TIME: 11.4 SECONDS (ref 9.4–11.7)
RBC # BLD AUTO: 3.58 MILLION/UL (ref 3.88–5.62)
SODIUM SERPL-SCNC: 139 MMOL/L (ref 136–145)
TROPONIN I SERPL-MCNC: 0.09 NG/ML
TROPONIN I SERPL-MCNC: 0.11 NG/ML
WBC # BLD AUTO: 7.24 THOUSAND/UL (ref 4.31–10.16)

## 2019-01-10 PROCEDURE — 83880 ASSAY OF NATRIURETIC PEPTIDE: CPT | Performed by: EMERGENCY MEDICINE

## 2019-01-10 PROCEDURE — 96365 THER/PROPH/DIAG IV INF INIT: CPT

## 2019-01-10 PROCEDURE — 87081 CULTURE SCREEN ONLY: CPT | Performed by: INTERNAL MEDICINE

## 2019-01-10 PROCEDURE — 96375 TX/PRO/DX INJ NEW DRUG ADDON: CPT

## 2019-01-10 PROCEDURE — 84540 ASSAY OF URINE/UREA-N: CPT | Performed by: PHYSICIAN ASSISTANT

## 2019-01-10 PROCEDURE — 36415 COLL VENOUS BLD VENIPUNCTURE: CPT | Performed by: EMERGENCY MEDICINE

## 2019-01-10 PROCEDURE — 83036 HEMOGLOBIN GLYCOSYLATED A1C: CPT | Performed by: PHYSICIAN ASSISTANT

## 2019-01-10 PROCEDURE — 93005 ELECTROCARDIOGRAM TRACING: CPT

## 2019-01-10 PROCEDURE — 85025 COMPLETE CBC W/AUTO DIFF WBC: CPT | Performed by: EMERGENCY MEDICINE

## 2019-01-10 PROCEDURE — 94660 CPAP INITIATION&MGMT: CPT

## 2019-01-10 PROCEDURE — 85730 THROMBOPLASTIN TIME PARTIAL: CPT | Performed by: EMERGENCY MEDICINE

## 2019-01-10 PROCEDURE — 84484 ASSAY OF TROPONIN QUANT: CPT | Performed by: EMERGENCY MEDICINE

## 2019-01-10 PROCEDURE — 94760 N-INVAS EAR/PLS OXIMETRY 1: CPT

## 2019-01-10 PROCEDURE — 80053 COMPREHEN METABOLIC PANEL: CPT | Performed by: EMERGENCY MEDICINE

## 2019-01-10 PROCEDURE — 99291 CRITICAL CARE FIRST HOUR: CPT

## 2019-01-10 PROCEDURE — 1124F ACP DISCUSS-NO DSCNMKR DOCD: CPT | Performed by: PHYSICIAN ASSISTANT

## 2019-01-10 PROCEDURE — 82948 REAGENT STRIP/BLOOD GLUCOSE: CPT

## 2019-01-10 PROCEDURE — 99223 1ST HOSP IP/OBS HIGH 75: CPT | Performed by: PHYSICIAN ASSISTANT

## 2019-01-10 PROCEDURE — 71045 X-RAY EXAM CHEST 1 VIEW: CPT

## 2019-01-10 PROCEDURE — 84484 ASSAY OF TROPONIN QUANT: CPT | Performed by: PHYSICIAN ASSISTANT

## 2019-01-10 PROCEDURE — 82570 ASSAY OF URINE CREATININE: CPT | Performed by: PHYSICIAN ASSISTANT

## 2019-01-10 PROCEDURE — 81001 URINALYSIS AUTO W/SCOPE: CPT | Performed by: EMERGENCY MEDICINE

## 2019-01-10 PROCEDURE — 85610 PROTHROMBIN TIME: CPT | Performed by: EMERGENCY MEDICINE

## 2019-01-10 RX ORDER — CLOBETASOL PROPIONATE 0.5 MG/G
CREAM TOPICAL DAILY PRN
Status: DISCONTINUED | OUTPATIENT
Start: 2019-01-10 | End: 2019-01-16 | Stop reason: HOSPADM

## 2019-01-10 RX ORDER — TIMOLOL MALEATE 5 MG/ML
1 SOLUTION/ DROPS OPHTHALMIC 2 TIMES DAILY
Status: DISCONTINUED | OUTPATIENT
Start: 2019-01-10 | End: 2019-01-16 | Stop reason: HOSPADM

## 2019-01-10 RX ORDER — IPRATROPIUM BROMIDE AND ALBUTEROL SULFATE 2.5; .5 MG/3ML; MG/3ML
3 SOLUTION RESPIRATORY (INHALATION) ONCE
Status: COMPLETED | OUTPATIENT
Start: 2019-01-10 | End: 2019-01-10

## 2019-01-10 RX ORDER — HEPARIN SODIUM 5000 [USP'U]/ML
5000 INJECTION, SOLUTION INTRAVENOUS; SUBCUTANEOUS EVERY 8 HOURS SCHEDULED
Status: DISCONTINUED | OUTPATIENT
Start: 2019-01-10 | End: 2019-01-16 | Stop reason: HOSPADM

## 2019-01-10 RX ORDER — ASPIRIN 81 MG/1
324 TABLET, CHEWABLE ORAL ONCE
Status: COMPLETED | OUTPATIENT
Start: 2019-01-10 | End: 2019-01-10

## 2019-01-10 RX ORDER — NITROGLYCERIN 20 MG/100ML
5-200 INJECTION INTRAVENOUS
Status: DISCONTINUED | OUTPATIENT
Start: 2019-01-10 | End: 2019-01-11

## 2019-01-10 RX ORDER — HYDRALAZINE HYDROCHLORIDE 20 MG/ML
5 INJECTION INTRAMUSCULAR; INTRAVENOUS EVERY 6 HOURS PRN
Status: DISCONTINUED | OUTPATIENT
Start: 2019-01-10 | End: 2019-01-13

## 2019-01-10 RX ORDER — VERAPAMIL HYDROCHLORIDE 80 MG/1
80 TABLET ORAL DAILY
Status: DISCONTINUED | OUTPATIENT
Start: 2019-01-11 | End: 2019-01-12

## 2019-01-10 RX ORDER — CHLORHEXIDINE GLUCONATE 0.12 MG/ML
15 RINSE ORAL EVERY 12 HOURS SCHEDULED
Status: DISCONTINUED | OUTPATIENT
Start: 2019-01-10 | End: 2019-01-10

## 2019-01-10 RX ORDER — FUROSEMIDE 10 MG/ML
40 INJECTION INTRAMUSCULAR; INTRAVENOUS ONCE
Status: COMPLETED | OUTPATIENT
Start: 2019-01-10 | End: 2019-01-10

## 2019-01-10 RX ORDER — LATANOPROST 50 UG/ML
1 SOLUTION/ DROPS OPHTHALMIC
Status: DISCONTINUED | OUTPATIENT
Start: 2019-01-10 | End: 2019-01-16 | Stop reason: HOSPADM

## 2019-01-10 RX ORDER — CLOBETASOL PROPIONATE 0.5 MG/G
OINTMENT TOPICAL DAILY PRN
Status: DISCONTINUED | OUTPATIENT
Start: 2019-01-10 | End: 2019-01-10

## 2019-01-10 RX ADMIN — ASPIRIN 81 MG 324 MG: 81 TABLET ORAL at 18:56

## 2019-01-10 RX ADMIN — NITROGLYCERIN 50 MCG/MIN: 20 INJECTION INTRAVENOUS at 19:00

## 2019-01-10 RX ADMIN — TIMOLOL MALEATE 1 DROP: 5 SOLUTION/ DROPS OPHTHALMIC at 22:33

## 2019-01-10 RX ADMIN — IPRATROPIUM BROMIDE AND ALBUTEROL SULFATE 3 ML: 2.5; .5 SOLUTION RESPIRATORY (INHALATION) at 17:36

## 2019-01-10 RX ADMIN — FUROSEMIDE 40 MG: 10 INJECTION, SOLUTION INTRAMUSCULAR; INTRAVENOUS at 18:56

## 2019-01-10 RX ADMIN — LATANOPROST 1 DROP: 50 SOLUTION/ DROPS OPHTHALMIC at 22:27

## 2019-01-10 RX ADMIN — HEPARIN SODIUM 5000 UNITS: 5000 INJECTION, SOLUTION INTRAVENOUS; SUBCUTANEOUS at 22:26

## 2019-01-10 NOTE — ED NOTES
Per respiratory @ bedside, patient on Bipap   Parameters are 65%, 10/5 and 100%     Breonna Lou RN  01/10/19 8414

## 2019-01-10 NOTE — ED PROCEDURE NOTE
PROCEDURE  ECG 12 Lead Documentation  Date/Time: 1/10/2019 5:28 PM  Performed by: Miriam Friend by: Aliyah Maxwell     ECG reviewed by me, the ED Provider: yes    Patient location:  ED  Interpretation:     Interpretation: abnormal    Quality:     Tracing quality:  Limited by artifact (respiratory distress)  Rate:     ECG rate:  87    ECG rate assessment: normal    Rhythm:     Rhythm: sinus rhythm    Ectopy:     Ectopy: PVCs    ST segments:     ST segments:  Non-specific  T waves:     T waves: normal           Christa Dixon DO  01/10/19 172

## 2019-01-10 NOTE — ED NOTES
pts areli Gerber Save[facility]ake cell phone # [phone], call if needed        Juan Lockhart RN  01/10/19 8876

## 2019-01-10 NOTE — PLAN OF CARE
Problem: RESPIRATORY - ADULT  Goal: Achieves optimal ventilation and oxygenation  INTERVENTIONS:  - Assess for changes in respiratory status  - Assess for changes in mentation and behavior  - Position to facilitate oxygenation and minimize respiratory effort  - Oxygen administration by appropriate delivery method based on oxygen saturation (per order) or ABGs    - Encourage broncho-pulmonary hygiene including cough, deep breathe, Incentive Spirometry  - Assess the need for suctioning and aspirate as needed  - Assess and instruct to report SOB or any respiratory difficulty  - Respiratory Therapy support as indicated  BIPAP as ordered  Outcome: Progressing

## 2019-01-10 NOTE — RESPIRATORY THERAPY NOTE
RT Protocol Note  Suraj Jacques [de-identified] y o  male MRN: [id number]  Unit/Bed#: ED 03 Encounter: [id number]    Assessment    Active Problems:    * No active hospital problems  *      Home Pulmonary Medications:  none       Past Medical History:   Diagnosis Date    Diabetes mellitus ([facility] [address] )      Social History     Social History    Marital status:      Spouse name: N/A    Number of children: N/A    Years of education: N/A     Social History Main Topics    Smoking status: Former Smoker    Smokeless tobacco: Former User    Alcohol use No    Drug use: No    Sexual activity: Not Asked     Other Topics Concern    None     Social History Narrative    None       Subjective         Objective    Physical Exam:   Assessment Type: Assess only  General Appearance: Alert, Awake  Respiratory Pattern: Dyspnea at rest, Tachypneic  Chest Assessment: Chest expansion symmetrical  Bilateral Breath Sounds: Diminished, Expiratory wheezes  R Breath Sounds: Diminished, Expiratory wheezes  L Breath Sounds: Diminished, Expiratory wheezes  Cough: Non-productive, Dry  O2 Device: BIPAP    Vitals:  Blood pressure (!) 167/111, pulse 83, temperature 97 7 °F (36 5 °C), resp  rate (!) 24, SpO2 100 %  Imaging and other studies: I have personally reviewed pertinent reports  O2 Device: BIPAP     Plan    Respiratory Plan:  Moderate/Severe Distress pathway        Resp Comments: pt placed on BIPAP

## 2019-01-11 ENCOUNTER — APPOINTMENT (INPATIENT)
Dept: NON INVASIVE DIAGNOSTICS | Facility: HOSPITAL | Age: 79
DRG: 280 | End: 2019-01-11
Payer: MEDICARE

## 2019-01-11 LAB
ALBUMIN SERPL BCP-MCNC: 3.1 G/DL (ref 3.5–5)
ALP SERPL-CCNC: 105 U/L (ref 46–116)
ALT SERPL W P-5'-P-CCNC: 24 U/L (ref 12–78)
ANION GAP SERPL CALCULATED.3IONS-SCNC: 9 MMOL/L (ref 4–13)
ANION GAP SERPL CALCULATED.3IONS-SCNC: 9 MMOL/L (ref 4–13)
AST SERPL W P-5'-P-CCNC: 17 U/L (ref 5–45)
BACTERIA UR QL AUTO: ABNORMAL /HPF
BASOPHILS # BLD AUTO: 0.02 THOUSANDS/ΜL (ref 0–0.1)
BASOPHILS NFR BLD AUTO: 0 % (ref 0–1)
BILIRUB SERPL-MCNC: 0.8 MG/DL (ref 0.2–1)
BILIRUB UR QL STRIP: NEGATIVE
BUN SERPL-MCNC: 56 MG/DL (ref 5–25)
BUN SERPL-MCNC: 57 MG/DL (ref 5–25)
CALCIUM SERPL-MCNC: 8.3 MG/DL (ref 8.3–10.1)
CALCIUM SERPL-MCNC: 8.6 MG/DL (ref 8.3–10.1)
CHLORIDE SERPL-SCNC: 101 MMOL/L (ref 100–108)
CHLORIDE SERPL-SCNC: 104 MMOL/L (ref 100–108)
CLARITY UR: CLEAR
CO2 SERPL-SCNC: 26 MMOL/L (ref 21–32)
CO2 SERPL-SCNC: 27 MMOL/L (ref 21–32)
COLOR UR: YELLOW
CREAT SERPL-MCNC: 2.3 MG/DL (ref 0.6–1.3)
CREAT SERPL-MCNC: 2.41 MG/DL (ref 0.6–1.3)
CREAT UR-MCNC: 55.2 MG/DL
CRP SERPL QL: 12.1 MG/L
EOSINOPHIL # BLD AUTO: 0.03 THOUSAND/ΜL (ref 0–0.61)
EOSINOPHIL NFR BLD AUTO: 1 % (ref 0–6)
ERYTHROCYTE [DISTWIDTH] IN BLOOD BY AUTOMATED COUNT: 14.5 % (ref 11.6–15.1)
EST. AVERAGE GLUCOSE BLD GHB EST-MCNC: 166 MG/DL
FLUAV AG SPEC QL: NORMAL
FLUBV AG SPEC QL: NORMAL
GFR SERPL CREATININE-BSD FRML MDRD: 24 ML/MIN/1.73SQ M
GFR SERPL CREATININE-BSD FRML MDRD: 26 ML/MIN/1.73SQ M
GLUCOSE SERPL-MCNC: 101 MG/DL (ref 65–140)
GLUCOSE SERPL-MCNC: 135 MG/DL (ref 65–140)
GLUCOSE SERPL-MCNC: 172 MG/DL (ref 65–140)
GLUCOSE SERPL-MCNC: 279 MG/DL (ref 65–140)
GLUCOSE SERPL-MCNC: 83 MG/DL (ref 65–140)
GLUCOSE UR STRIP-MCNC: ABNORMAL MG/DL
HBA1C MFR BLD: 7.4 % (ref 4.2–6.3)
HCT VFR BLD AUTO: 32.3 % (ref 36.5–49.3)
HGB BLD-MCNC: 10.6 G/DL (ref 12–17)
HGB UR QL STRIP.AUTO: ABNORMAL
INR PPP: 1.1 (ref 0.86–1.16)
KETONES UR STRIP-MCNC: NEGATIVE MG/DL
LEUKOCYTE ESTERASE UR QL STRIP: NEGATIVE
LYMPHOCYTES # BLD AUTO: 1.13 THOUSANDS/ΜL (ref 0.6–4.47)
LYMPHOCYTES NFR BLD AUTO: 19 % (ref 14–44)
MAGNESIUM SERPL-MCNC: 1.6 MG/DL (ref 1.6–2.6)
MAGNESIUM SERPL-MCNC: 2.1 MG/DL (ref 1.6–2.6)
MCH RBC QN AUTO: 32.9 PG (ref 26.8–34.3)
MCHC RBC AUTO-ENTMCNC: 32.8 G/DL (ref 31.4–37.4)
MCV RBC AUTO: 100 FL (ref 82–98)
MONOCYTES # BLD AUTO: 0.87 THOUSAND/ΜL (ref 0.17–1.22)
MONOCYTES NFR BLD AUTO: 15 % (ref 4–12)
NEUTROPHILS # BLD AUTO: 3.86 THOUSANDS/ΜL (ref 1.85–7.62)
NEUTS SEG NFR BLD AUTO: 65 % (ref 43–75)
NITRITE UR QL STRIP: NEGATIVE
NON-SQ EPI CELLS URNS QL MICRO: ABNORMAL /HPF
PH UR STRIP.AUTO: 5.5 [PH] (ref 5–9)
PHOSPHATE SERPL-MCNC: 3.2 MG/DL (ref 2.3–4.1)
PLATELET # BLD AUTO: 123 THOUSANDS/UL (ref 149–390)
PMV BLD AUTO: 9.6 FL (ref 8.9–12.7)
POTASSIUM SERPL-SCNC: 3.9 MMOL/L (ref 3.5–5.3)
POTASSIUM SERPL-SCNC: 4.8 MMOL/L (ref 3.5–5.3)
PROCALCITONIN SERPL-MCNC: 0.17 NG/ML
PROT SERPL-MCNC: 5.8 G/DL (ref 6.4–8.2)
PROT UR STRIP-MCNC: ABNORMAL MG/DL
PROTHROMBIN TIME: 11.5 SECONDS (ref 9.4–11.7)
RBC # BLD AUTO: 3.22 MILLION/UL (ref 3.88–5.62)
RBC #/AREA URNS AUTO: ABNORMAL /HPF
RSV B RNA SPEC QL NAA+PROBE: NORMAL
SODIUM SERPL-SCNC: 137 MMOL/L (ref 136–145)
SODIUM SERPL-SCNC: 139 MMOL/L (ref 136–145)
SP GR UR STRIP.AUTO: 1.01 (ref 1–1.03)
TROPONIN I SERPL-MCNC: 0.1 NG/ML
TROPONIN I SERPL-MCNC: 0.12 NG/ML
TSH SERPL DL<=0.05 MIU/L-ACNC: 1.82 UIU/ML (ref 0.36–3.74)
UROBILINOGEN UR QL STRIP.AUTO: 0.2 E.U./DL
UUN 24H UR-MCNC: 425 MG/DL
WBC # BLD AUTO: 5.91 THOUSAND/UL (ref 4.31–10.16)
WBC #/AREA URNS AUTO: ABNORMAL /HPF

## 2019-01-11 PROCEDURE — 84484 ASSAY OF TROPONIN QUANT: CPT | Performed by: PHYSICIAN ASSISTANT

## 2019-01-11 PROCEDURE — 93306 TTE W/DOPPLER COMPLETE: CPT | Performed by: INTERNAL MEDICINE

## 2019-01-11 PROCEDURE — 87631 RESP VIRUS 3-5 TARGETS: CPT | Performed by: NURSE PRACTITIONER

## 2019-01-11 PROCEDURE — 84145 PROCALCITONIN (PCT): CPT | Performed by: NURSE PRACTITIONER

## 2019-01-11 PROCEDURE — 94760 N-INVAS EAR/PLS OXIMETRY 1: CPT

## 2019-01-11 PROCEDURE — 84100 ASSAY OF PHOSPHORUS: CPT | Performed by: PHYSICIAN ASSISTANT

## 2019-01-11 PROCEDURE — 85610 PROTHROMBIN TIME: CPT | Performed by: PHYSICIAN ASSISTANT

## 2019-01-11 PROCEDURE — 80053 COMPREHEN METABOLIC PANEL: CPT | Performed by: PHYSICIAN ASSISTANT

## 2019-01-11 PROCEDURE — 85025 COMPLETE CBC W/AUTO DIFF WBC: CPT | Performed by: PHYSICIAN ASSISTANT

## 2019-01-11 PROCEDURE — 84443 ASSAY THYROID STIM HORMONE: CPT | Performed by: NURSE PRACTITIONER

## 2019-01-11 PROCEDURE — 80048 BASIC METABOLIC PNL TOTAL CA: CPT | Performed by: PHYSICIAN ASSISTANT

## 2019-01-11 PROCEDURE — 93306 TTE W/DOPPLER COMPLETE: CPT

## 2019-01-11 PROCEDURE — 82948 REAGENT STRIP/BLOOD GLUCOSE: CPT

## 2019-01-11 PROCEDURE — 99222 1ST HOSP IP/OBS MODERATE 55: CPT | Performed by: INTERNAL MEDICINE

## 2019-01-11 PROCEDURE — 99233 SBSQ HOSP IP/OBS HIGH 50: CPT | Performed by: INTERNAL MEDICINE

## 2019-01-11 PROCEDURE — 86038 ANTINUCLEAR ANTIBODIES: CPT | Performed by: NURSE PRACTITIONER

## 2019-01-11 PROCEDURE — 83735 ASSAY OF MAGNESIUM: CPT | Performed by: PHYSICIAN ASSISTANT

## 2019-01-11 PROCEDURE — 86039 ANTINUCLEAR ANTIBODIES (ANA): CPT | Performed by: NURSE PRACTITIONER

## 2019-01-11 PROCEDURE — 86140 C-REACTIVE PROTEIN: CPT | Performed by: NURSE PRACTITIONER

## 2019-01-11 RX ORDER — POTASSIUM CHLORIDE 750 MG/1
10 TABLET, EXTENDED RELEASE ORAL ONCE
Status: DISCONTINUED | OUTPATIENT
Start: 2019-01-11 | End: 2019-01-11

## 2019-01-11 RX ORDER — COLCHICINE 0.6 MG/1
0.3 TABLET ORAL DAILY
Status: DISCONTINUED | OUTPATIENT
Start: 2019-01-11 | End: 2019-01-16

## 2019-01-11 RX ORDER — FUROSEMIDE 10 MG/ML
40 INJECTION INTRAMUSCULAR; INTRAVENOUS ONCE
Status: COMPLETED | OUTPATIENT
Start: 2019-01-11 | End: 2019-01-11

## 2019-01-11 RX ORDER — MAGNESIUM SULFATE HEPTAHYDRATE 40 MG/ML
2 INJECTION, SOLUTION INTRAVENOUS ONCE
Status: COMPLETED | OUTPATIENT
Start: 2019-01-11 | End: 2019-01-11

## 2019-01-11 RX ORDER — COLCHICINE 0.6 MG/1
0.3 TABLET ORAL ONCE
Status: DISCONTINUED | OUTPATIENT
Start: 2019-01-11 | End: 2019-01-11

## 2019-01-11 RX ORDER — POTASSIUM CHLORIDE 750 MG/1
10 TABLET, EXTENDED RELEASE ORAL ONCE
Status: COMPLETED | OUTPATIENT
Start: 2019-01-11 | End: 2019-01-11

## 2019-01-11 RX ADMIN — POTASSIUM CHLORIDE 10 MEQ: 750 TABLET, EXTENDED RELEASE ORAL at 09:03

## 2019-01-11 RX ADMIN — HYDRALAZINE HYDROCHLORIDE 5 MG: 20 INJECTION INTRAMUSCULAR; INTRAVENOUS at 05:21

## 2019-01-11 RX ADMIN — LATANOPROST 1 DROP: 50 SOLUTION/ DROPS OPHTHALMIC at 22:18

## 2019-01-11 RX ADMIN — TIMOLOL MALEATE 1 DROP: 5 SOLUTION/ DROPS OPHTHALMIC at 09:03

## 2019-01-11 RX ADMIN — INSULIN LISPRO 1 UNITS: 100 INJECTION, SOLUTION INTRAVENOUS; SUBCUTANEOUS at 00:00

## 2019-01-11 RX ADMIN — VERAPAMIL HYDROCHLORIDE 80 MG: 80 TABLET, FILM COATED ORAL at 09:03

## 2019-01-11 RX ADMIN — FUROSEMIDE 40 MG: 10 INJECTION, SOLUTION INTRAMUSCULAR; INTRAVENOUS at 11:04

## 2019-01-11 RX ADMIN — INSULIN LISPRO 3 UNITS: 100 INJECTION, SOLUTION INTRAVENOUS; SUBCUTANEOUS at 17:20

## 2019-01-11 RX ADMIN — TIMOLOL MALEATE 1 DROP: 5 SOLUTION/ DROPS OPHTHALMIC at 17:20

## 2019-01-11 RX ADMIN — INSULIN LISPRO 1 UNITS: 100 INJECTION, SOLUTION INTRAVENOUS; SUBCUTANEOUS at 12:40

## 2019-01-11 RX ADMIN — MAGNESIUM SULFATE HEPTAHYDRATE 2 G: 40 INJECTION, SOLUTION INTRAVENOUS at 05:44

## 2019-01-11 RX ADMIN — COLCHICINE 0.3 MG: 0.6 TABLET, FILM COATED ORAL at 17:20

## 2019-01-11 RX ADMIN — HEPARIN SODIUM 5000 UNITS: 5000 INJECTION, SOLUTION INTRAVENOUS; SUBCUTANEOUS at 22:18

## 2019-01-11 RX ADMIN — HEPARIN SODIUM 5000 UNITS: 5000 INJECTION, SOLUTION INTRAVENOUS; SUBCUTANEOUS at 05:22

## 2019-01-11 RX ADMIN — HEPARIN SODIUM 5000 UNITS: 5000 INJECTION, SOLUTION INTRAVENOUS; SUBCUTANEOUS at 13:41

## 2019-01-11 NOTE — PROGRESS NOTES
Daily Progress Note - Critical Care/ Terence Daley [de-identified] y o  male MRN: [id number]  Unit/Bed#: ICU 08 Encounter: [id number]    ______________________________________________________________________  Assessment:   Principal Problem:    Acute respiratory failure with hypoxia ([facility])  Active Problems:    Acute CHF (congestive heart failure) ([facility])    NSTEMI (non-ST elevated myocardial infarction) ([facility])    KARINA (acute kidney injury) ([facility])    Leg edema    Elevated brain natriuretic peptide (BNP) level    Controlled type 2 diabetes mellitus, without long-term current use of insulin ([facility])  Resolved Problems:    * No resolved hospital problems  *      Controlled type 2 diabetes mellitus, without long-term current use of insulin ([facility])   Assessment & Plan    No results found for: HGBA1C    No results for input(s): POCGLU in the last 72 hours  Blood Sugar Average: Last 72 hrs:      Will obtain hemoglobin A1c  - hold metformin and glyburide for now while inpatient, will place on sliding scale insulin       Elevated brain natriuretic peptide (BNP) level   Assessment & Plan    - IV diuresis  - recheck BNP for discharge when euvolemic     Leg edema   Assessment & Plan    - IV diuresis     KARINA (acute kidney injury) ([facility] [facility] )   Assessment & Plan    - likely etiology prerenal secondary to cardiorenal syndrome as well as metformin, and ARB use  - will hold metformin, ARB, hydrochlorothiazide for now  - follow-up urine creatinine and urea to calculate FEUrea  - slight decrease in creatinine  - nephrology consultation if renal function worsens     NSTEMI (non-ST elevated myocardial infarction) ([facility] [place] [facility] )   Assessment & Plan    - likely type 2 secondary to congestive heart failure  - will trend  - will place cardiology consultation  - will obtain echocardiogram       Acute CHF (congestive heart failure) ([facility] [place] [facility] )   Assessment & Plan    - patient without history of congestive heart failure, though no echo in our system  - does have history of lower extremity swelling is on Lasix and hydrochlorothiazide at home  - could be secondary to dietary indiscretion over holiday season, cold weather, and patient being restarted on propranolol in December  - patient with [place] [address], chest x-ray indicative of fluid overload, on exam, JVD and and significant lower extremity edema  -  given 40 Lasix emergency department with 800 cc output  Consider further diuresis today  - nitro drip discontinued     * Acute respiratory failure with hypoxia ([facility])   Assessment & Plan    - Likely secondary to volume overload secondary to possible underlying undiagnosed, or possibly new congestive heart failure  - Placed on BiPAP by emergency department, weaned to 3 L nasal cannula overnight, now off this AM  - will diurese, given Lasix 40 mg in emergency department, diuresed 800 cc  Consider scheduling Lasix IV daily  - nitro drip discontinued, p r n  Hydralazine for systolic blood pressure greater than 160  - no signs or symptoms concerning of infection           Plan:    Neuro:   · Regulate sleep/wake cycle    CV:   · Cardiac infusions:  None, nitro drip now off  · Rhythm: NSR  · Follow rhythm on telemetry    Pulm:   · Significantly improved work of breathing and oxygen saturation    Continue room air, continue incentive spirometry    GI:   · Nutrition/diet plan:  Began cardiac diet  · Stress ulcer prophylaxis: No prophylaxis needed  · Bowel regimen: None currently      FEN:     · Fluid/Diuretic plan: Needs diuresis lasix IV  · Goal 24 hour fluid balance:  -500 cc to 1 L as tolerated  · Electrolytes repleted:  Given 10 mEq potassium, 2 g Mag  · Goal: K >4 0, Mag >2 0, and Phos >3 0    :   · Indwelling Dupont present: no   ·     ID:   · No signs or symptoms of infection  · Trend temps and WBC count    Heme:   · No issues, continue subcu heparin  · Trend hgb and plts    Endo:   · Sliding-scale insulin  · Glycemic control plan: Blood glucose controlled on current regimen    Msk/Skin:  · Mobility goal:  Out of bed with assistance  · PT consult: yes  · OT consult: yes  · Frequent turning and off-loading    Family:  · Family updated at time of admission    Lines:  · Peripheral IVs    VTE Prophylaxis:  · Pharmacologic Prophylaxis: Heparin  · Mechanical Prophylaxis: sequential compression device    Disposition: Transfer to telemetry    Code Status: Level 1 - Full Code    Counseling / Coordination of Care  Total time spent today 34 minutes  Greater than 50% of total time was spent with the patient and / or family counseling and / or coordination of care    ______________________________________________________________________    HPI/24hr events:   Patient admitted, initially on BiPAP and nitro  Nitro as high as 80, but now titrated off with blood pressures in the 140s  Patient weaned to 3 L nasal cannula around midnight, now on room air oxygen and tolerating well  Patient admits to significant relief from arrival   Denies any chest pain or pain in general     ______________________________________________________________________    Physical Exam:   Physical Exam   Constitutional: He is oriented to person, place, and time  He appears well-developed and well-nourished  Appears stated age, lying comfortably in bed, no distress   HENT:   Head: Normocephalic and atraumatic  Eyes: Pupils are equal, round, and reactive to light  Conjunctivae and EOM are normal    Neck: Normal range of motion  Neck supple  No JVD present  Cardiovascular: Normal rate and regular rhythm  Exam reveals no friction rub  No murmur heard  Pulmonary/Chest: Effort normal  No respiratory distress  He has rales (Crackles in bases)  Abdominal: Soft  Bowel sounds are normal  He exhibits no distension  There is no tenderness  Genitourinary:   Genitourinary Comments: Deferred   Musculoskeletal: Normal range of motion  He exhibits edema ( 2+ pitting edema bilateral lower extremity)  Neurological: He is alert and oriented to person, place, and time  No cranial nerve deficit  Skin: Skin is warm and dry  Capillary refill takes less than 2 seconds  ______________________________________________________________________  Vitals:    19 0300 19 0322 19 0400 19 0500   BP: 157/85  148/71 (!) 173/79   Pulse: 60  64 70   Resp: 20  20 (!) 26   Temp:  (!) 97 1 °F (36 2 °C)     TempSrc:  Temporal     SpO2: 100%  97% 98%   Weight:       Height:                  Temperature:   Temp (24hrs), Av 7 °F (36 5 °C), Min:97 1 °F (36 2 °C), Max:98 5 °F (36 9 °C)    Current Temperature: (!) 97 1 °F (36 2 °C)    Weights:   IBW: 70 7 kg    Body mass index is 24 19 kg/m²  Weight (last 2 days)     Date/Time   Weight    01/10/19 2041  74 3 (163 8)              Hemodynamic Monitoring:  N/A       Non-Invasive/Invasive Ventilation Settings:  Respiratory    Lab Data (Last 4 hours)    None         O2/Vent Data (Last 4 hours)    None              No results found for: PHART, DQQ4FNI, PO2ART, XPI9REX, C9NWYQVX, BEART, SOURCE  SpO2: SpO2: 98 %, SpO2 Activity: SpO2 Activity: At Rest, SpO2 Device: O2 Device: Nasal cannula    Intake and Outputs:  I/O       701 - 01/10 0700 01/10 07 -  0700    Urine (mL/kg/hr)  800    Total Output   800    Net   -800                Nutrition:        Diet Orders            Start     Ordered    19  Diet Cardiovascular; Cardiac  Diet effective now     Question Answer Comment   Diet Type Cardiovascular    Cardiac Cardiac    RD to adjust diet per protocol?  Yes        19 045            Labs:     Results from last 7 days  Lab Units 19  0450 01/10/19  1731   WBC Thousand/uL 5 91 7 24   HEMOGLOBIN g/dL 10 6* 11 6*   HEMATOCRIT % 32 3* 37 0   PLATELETS Thousands/uL 123* 157   NEUTROS PCT % 65 71   MONOS PCT % 15* 12       Results from last 7 days  Lab Units 19  0450 01/10/19  1731   SODIUM mmol/L 139 139   POTASSIUM mmol/L 3 9 4 8 CHLORIDE mmol/L 104 102   CO2 mmol/L 26 26   BUN mg/dL 56* 56*   CREATININE mg/dL 2 30* 2 43*   CALCIUM mg/dL 8 3 8 6   ALK PHOS U/L 105 125*   ALT U/L 24 26   AST U/L 17 21       Results from last 7 days  Lab Units 01/11/19  0450   MAGNESIUM mg/dL 1 6     Lab Results   Component Value Date    PHOS 3 2 01/11/2019        Results from last 7 days  Lab Units 01/11/19  0450 01/10/19  1731   INR  1 10 1 09   PTT seconds  --  31       0  Lab Value Date/Time   TROPONINI 0 12 (H) 01/10/2019 2353   TROPONINI 0 11 (H) 01/10/2019 2058   TROPONINI 0 09 (H) 01/10/2019 1731         ABG:No results found for: PHART, BMQ4ZXN, PO2ART, LCO2UNM, T3TGSPAO, BEART, SOURCE    Imaging: CXR:  From ED:  Increased vascular congestion ( my read)  I have personally reviewed pertinent films in PACS  ECHO:  Pending   EKG:  Reviewed    Micro:  No results found for: Rosan Orts, SPUTUMCULTUR    Allergies: Allergies   Allergen Reactions    Sulfa Antibiotics     Sulfur      Medications:   Scheduled Meds:    Current Facility-Administered Medications:  clobetasol  Topical Daily PRN Pippa Cea, PA-C   heparin (porcine) 5,000 Units Subcutaneous [facility] Pippa Cea, PA-C   hydrALAZINE 5 mg Intravenous Q6H PRN Pippa Cea, PA-C   insulin lispro 1-6 Units Subcutaneous Q6H [facility] Pippa Cea, PA-C   latanoprost 1 drop Both Eyes HS Pippa Cea, PA-C   magnesium sulfate 2 g Intravenous Once Pippa Cea, PA-C   potassium chloride 10 mEq Oral Once Pippa Cea, PA-C   timolol 1 drop Both Eyes BID Pippa Cea, PA-C   verapamil 80 mg Oral Daily Pippa Cea, PA-C     Continuous Infusions:   PRN Meds:    clobetasol  Daily PRN   hydrALAZINE 5 mg Q6H PRN       Invasive lines and devices:   Invasive Devices     Peripheral Intravenous Line            Peripheral IV 01/10/19 Left Antecubital less than 1 day    Peripheral IV 01/10/19 Right Wrist less than 1 day                   SIGNATURE: Pippa Brian PA-C  DATE: January 11, 2019 Normal vision: sees adequately in most situations; can see medication labels, newsprint

## 2019-01-11 NOTE — H&P
History and Physical - Critical Care/ Stepdown   Katie Gee [de-identified] y o  male MRN: [id number]  Unit/Bed#: ICU 08 Encounter: [id number]    Reason for Admission / Chief Complaint:  Shortness of breath, congestive heart failure    History of Present Illness:  Katie Gee is a [de-identified] y o  male who presents to the [facility] Emergency Department on 01/10 with several week history of worsening shortness of breath  He came to emergency department today when symptoms significantly worsened  He denied sick contacts, fevers, chills, cough  He has a past medical history of gout, hypertension, diabetes, and lower extremity edema, but no formal congestive heart failure or chronic kidney disease history  On arrival to emergency department, patient was significantly tachypneic in the high 20s low 30s, sats in the 90s and was significantly hypertensive with systolic blood pressure close to 200, so patient was placed on BiPAP, nitro drip, and diuresed with 40 mg of IV Lasix  Chest x-ray was indicative of vascular congestion  Laboratory showed a creatinine of 2 43, [place]NP of 9[address], and troponin of 0 09  Due to need for noninvasive positive pressure ventilation as well as nitro drip, he was referred for step-down admission  On chart review and interview with patient and his son, it appears that patient was on propranolol in the past   He stopped taking this without telling is Dr  In October or November of last year  He was then seen by Dr Yudelka Stone with Cardiology secondary to increased palpitations  His EKG at that visit showed frequent PVCs, so the propranolol was restarted and patient was set up to have both an echocardiogram and Holter monitor likely next week  He claims that his symptoms did begin at time of resumption of propranolol  History obtained from child, chart review and the patient      Past Medical History:  Past Medical History:   Diagnosis Date    Diabetes mellitus [facility])        Past Surgical History:  History reviewed  No pertinent surgical history  Past Family History:  Family History   Problem Relation Age of Onset    Heart disease Mother        Social History:  History   Smoking Status    Former Smoker   Smokeless Tobacco    Former User      History   Alcohol Use No     History   Drug Use No     Marital Status:   Exercise History:  Independent ADLs    Medications:  Current Facility-Administered Medications   Medication Dose Route Frequency    chlorhexidine (PERIDEX) 0 12 % oral rinse 15 mL  15 mL Swish & Spit Q12H [facility]    [START ON 1/11/2019] enoxaparin (LOVENOX) subcutaneous injection 30 mg  30 mg Subcutaneous Daily    hydrALAZINE (APRESOLINE) injection 5 mg  5 mg Intravenous Q6H PRN    insulin lispro (HumaLOG) 100 units/mL subcutaneous injection 1-6 Units  1-6 Units Subcutaneous Q6H [facility]    nitroGLYcerin (TRIDIL) 50 mg in 250 mL infusion (premix)  5-200 mcg/min Intravenous Titrated     Home medications:  Prior to Admission medications    Medication Sig Start Date End Date Taking?  Authorizing Provider   allopurinol (ZYLOPRIM) 100 mg tablet Take 100 mg by mouth 2 (two) times a day    Historical Provider, MD   ALPRAZolam Sabi Starch) 0 5 mg tablet  3/12/18   Historical Provider, MD   azithromycin (ZITHROMAX) 250 mg tablet  1/26/18   Historical Provider, MD   brimonidine-timolol (COMBIGAN) 0 2-0 5 % Administer 1 drop to both eyes every 12 (twelve) hours    Historical Provider, MD   furosemide (LASIX) 40 mg tablet Take 40 mg by mouth daily    Historical Provider, MD   glyBURIDE (DIABETA) 5 mg tablet Take 10 mg by mouth 2 (two) times a day with meals    Historical Provider, MD   halobetasol (ULTRAVATE) 0 05 % cream  11/1/18   Historical Provider, MD   hydrochlorothiazide (HYDRODIURIL) 25 mg tablet Take 1 tablet by mouth daily 12/13/18   Historical Provider, MD   irbesartan (AVAPRO) 300 mg tablet Take 300 mg by mouth daily at bedtime    Historical Provider, MD   latanoprost (XALATAN) 0 005 % ophthalmic solution 1 drop daily at bedtime    Historical Provider, MD   pioglitazone (ACTOS) 45 mg tablet Take 45 mg by mouth daily    Historical Provider, MD   propranolol (INDERAL) 80 mg tablet Take 1 tablet by mouth daily 18   Historical Provider, MD   timolol (TIMOPTIC) 0 5 % ophthalmic solution  18   Historical Provider, MD   verapamil (CALAN) 80 mg tablet  3/9/18   Historical Provider, MD     Allergies: Allergies   Allergen Reactions    Sulfa Antibiotics     Sulfur        ROS:   Review of Systems   Constitutional: Positive for appetite change (Decreased) and fatigue  Respiratory: Positive for shortness of breath and wheezing  Cardiovascular: Positive for palpitations and leg swelling  Genitourinary: Positive for decreased urine volume  Neurological: Positive for weakness  All other systems reviewed and are negative  Vitals:  Vitals:    01/10/19 2040 01/10/19 2041 01/10/19 2044 01/10/19 2053   BP: 166/67      BP Location: Left arm      Pulse: 92  85    Resp: 22  19    Temp:       SpO2: 100%  96% 100%   Weight:  74 3 kg (163 lb 12 8 oz)       Temperature:   Temp (24hrs), Av 7 °F (36 5 °C), Min:97 7 °F (36 5 °C), Max:97 7 °F (36 5 °C)    Current Temperature: 97 7 °F (36 5 °C)    Weights:   IBW: -88 kg  Body mass index is 24 19 kg/m²  Hemodynamic Monitoring:  N/A     Non-Invasive/Invasive Ventilation Settings:  Respiratory    Lab Data (Last 4 hours)    None         O2/Vent Data (Last 4)      01/10 1710 01/10 1814 01/10 2044 01/10 2053    Non-Invasive Ventilation Mode BiPAP BiPAP BiPAP BiPAP              No results found for: PHART, FSM9JGP, PO2ART, LTR8RXA, M9OAHQPR, BEART, SOURCE  SpO2: SpO2: 100 %, SpO2 Activity: SpO2 Activity: At Rest, SpO2 Device: O2 Device:  (bipap)     Physical Exam:  Physical Exam   Constitutional: He is oriented to person, place, and time  He appears well-developed and well-nourished  No distress     Appears stated age, moderately tachypneic with noninvasive positive pressure ventilator in place   HENT:   Head: Normocephalic and atraumatic  Eyes: Pupils are equal, round, and reactive to light  EOM are normal    Neck: Neck supple  JVD present  Cardiovascular: Normal rate, regular rhythm, S1 normal, S2 normal and normal pulses  Frequent extrasystoles are present  Exam reveals no gallop  No murmur heard  Pulmonary/Chest: Effort normal  Tachypnea noted  He has decreased breath sounds in the right lower field and the left lower field  He has rales in the right upper field, the right middle field, the left upper field and the left middle field  Abdominal: Soft  He exhibits no distension  There is no tenderness  Genitourinary:   Genitourinary Comments: Deferred   Musculoskeletal: Normal range of motion  He exhibits edema (3+ pitting in bilateral lower extremities)  Neurological: He is alert and oriented to person, place, and time  No cranial nerve deficit  Skin: Skin is warm and dry  Capillary refill takes less than 2 seconds  Nursing note and vitals reviewed  Labs:    Results from last 7 days  Lab Units 01/10/19  1731   WBC Thousand/uL 7 24   HEMOGLOBIN g/dL 11 6*   HEMATOCRIT % 37 0   PLATELETS Thousands/uL 157   NEUTROS PCT % 71   MONOS PCT % 12      Results from last 7 days  Lab Units 01/10/19  1731   SODIUM mmol/L 139   POTASSIUM mmol/L 4 8   CHLORIDE mmol/L 102   CO2 mmol/L 26   BUN mg/dL 56*   CREATININE mg/dL 2 43*   CALCIUM mg/dL 8 6   ALK PHOS U/L 125*   ALT U/L 26   AST U/L 21                Results from last 7 days  Lab Units 01/10/19  1731   INR  1 09   PTT seconds 31           0  Lab Value Date/Time   TROPONINI 0 09 (H) 01/10/2019 1731       Imaging: CXR:  Vascular congestion, cardiomegaly (my interpretation) I have personally reviewed pertinent films in PACS    EKG: This was personally reviewed by myself  Micro:  Blood Culture: No results found for: BLOODCX  Urine Culture: No results found for: URINECX  Sputum Culture:  No components found for: SPUTUMCX  Wound Culure: No results found for: WOUNDCULT  ______________________________________________________________________    Assessment:   Principal Problem:    Acute respiratory failure with hypoxia ([facility] )  Active Problems:    Acute CHF (congestive heart failure) ([facility])    NSTEMI (non-ST elevated myocardial infarction) ([facility])    KARINA (acute kidney injury) ([facility])    Leg edema    Elevated brain natriuretic peptide (BNP) level    Controlled type 2 diabetes mellitus, without long-term current use of insulin ([facility])  Resolved Problems:    * No resolved hospital problems  *      Controlled type 2 diabetes mellitus, without long-term current use of insulin ([facility])   Assessment & Plan    No results found for: HGBA1C    No results for input(s): POCGLU in the last 72 hours  Blood Sugar Average: Last 72 hrs:      Will obtain hemoglobin A1c  - hold metformin and glyburide for now while inpatient, will place on sliding scale insulin       Elevated brain natriuretic peptide (BNP) level   Assessment & Plan    - IV diuresis     Leg edema   Assessment & Plan    - IV diuresis     KARINA (acute kidney injury) ([facility] )   Assessment & Plan    - likely etiology pre renal secondary to cardiorenal syndrome as well as metformin, and ARB use  - will hold metformin, ARB, hydrochlorothiazide for now  - obtain urine creatinine and urea to calculate FEUrea  - nephrology consultation if renal function worsens     NSTEMI (non-ST elevated myocardial infarction) [facility])   Assessment & Plan    - likely type 2 secondary to congestive heart failure  - will trend  - will place cardiology consultation  - will obtain echocardiogram       Acute CHF (congestive heart failure) ([facility] )   Assessment & Plan    - patient without history of congestive heart failure, though no echo in our system  - does have history of lower extremity swelling is on Lasix and hydrochlorothiazide at home  - could be secondary to dietary indiscretion over holiday season, cold weather, and patient being restarted on propranolol in December  - patient with [place] [address], chest x-ray indicative of fluid overload, on exam, JVD and and significant lower extremity edema  - will place on nitro drip, given 40 Lasix emergency department, will give another 40 if urine output not adequate  - afterload reduction with nitro drip, hydralazine, and home antihypertensives as appropriate as per below     * Acute respiratory failure with hypoxia ([address] )   Assessment & Plan    - Likely secondary to volume overload secondary to possible underlying undiagnosed, or possibly new congestive heart failure  - Placed on BiPAP by emergency department, continue for now as significant relief in work of breathing  - will dicheco, given Lasix 40 mg in emergency department, will consider additional 40 mg secondary to hypertension and significant rales in lower extremity edema on exam  - will continue nitro drip for afterload reduction, also add hydralazine and react home antihypertensives as appropriate  - no signs or symptoms concerning of infection           Plan:    Neuro:   · Delirium precautions  Sleep hygiene    CV:   · See above  Pulm:   · Continue BiPAP for tonight  Wean for sats greater than 92% and work of breathing    GI:   · NPO for now    :  · Trend urine output, daily weights    FEN:   · A m  Electrolytes in light of aggressive diuresis    ID:   · No signs or symptoms of infection, monitor fever curve    Heme:   · Lovenox at renal adjusted dose for prophylaxis    Endo:   · Insulin for diabetes  Check A1c    MSK/Skin:   · Frequent turning offloading    Family:  · Family updated: yes, at bedside    Disposition: Admit to Stepdown    Counseling / Coordination of Care  Total Critical Care time spent 45 minutes excluding procedures, teaching and family updates        ______________________________________________________________________    VTE Pharmacologic Prophylaxis: Enoxaparin (Lovenox)  VTE Mechanical Prophylaxis: sequential compression device    Invasive lines and devices: Invasive Devices     Peripheral Intravenous Line            Peripheral IV 01/10/19 Left Antecubital less than 1 day    Peripheral IV 01/10/19 Right Wrist less than 1 day                Code Status: Level 1 - Full Code  POA:    POLST:      Given critical illness, patient length of stay will require greater than two midnights      Signature: Efren Mena  Date: 1/10/2019

## 2019-01-11 NOTE — ASSESSMENT & PLAN NOTE
- Likely secondary to volume overload secondary to possible underlying undiagnosed, or possibly new congestive heart failure and newly discovered pericardial effusion  - initially was on BiPAP, now on 2 L nasal cannula  - diuresed well with Lasix, would continue  - nitro drip discontinued, p r n   Hydralazine for systolic blood pressure greater than 160  - no signs or symptoms concerning of infection

## 2019-01-11 NOTE — ED PROVIDER NOTES
History  Chief Complaint   Patient presents with    Shortness of Breath     pt presents to the ed with increased sob x 1 wk  pt states it became worse today      Patient presents for evaluation of shortness of breath  Denies chest pain  Has had leg swelling for some time and followed up with cardiology in for that with moderate exertional dyspnea in Dec 2018  Scheduled for holter and ECHO  Next week  States last week swelling legs got worse and his shortness of breath progressed and now for the last 2 days is short of breath with minimal effort and at rest          History provided by:  Patient and medical records   used: No    Shortness of Breath   Associated symptoms: no chest pain        Prior to Admission Medications   Prescriptions Last Dose Informant Patient Reported? Taking?    ALPRAZolam (XANAX) 0 5 mg tablet Not Taking at Unknown time Self Yes No   allopurinol (ZYLOPRIM) 100 mg tablet 1/10/2019 at Unknown time Self Yes Yes   Sig: Take 100 mg by mouth 2 (two) times a day   brimonidine-timolol (COMBIGAN) 0 2-0 5 % 1/10/2019 at Unknown time Self Yes Yes   Sig: Administer 1 drop to both eyes every 12 (twelve) hours   furosemide (LASIX) 40 mg tablet 1/10/2019 at Unknown time Self Yes Yes   Sig: Take 40 mg by mouth daily   glyBURIDE (DIABETA) 5 mg tablet 1/10/2019 at Unknown time Self Yes Yes   Sig: Take 5 mg by mouth 2 (two) times a day with meals     halobetasol (ULTRAVATE) 0 05 % cream 1/10/2019 at Unknown time Self Yes Yes   Sig: Apply 1 application topically daily as needed (Ezcema)     hydrochlorothiazide (HYDRODIURIL) 25 mg tablet 1/10/2019 at Unknown time Self Yes Yes   Sig: Take 1 tablet by mouth daily   irbesartan (AVAPRO) 300 mg tablet 1/10/2019 at Unknown time Self Yes Yes   Sig: Take 300 mg by mouth daily at bedtime   latanoprost (XALATAN) 0 005 % ophthalmic solution 1/10/2019 at Unknown time Self Yes Yes   Si drop daily at bedtime   metFORMIN (GLUCOPHAGE) 500 mg tablet 1/10/2019 at Unknown time Self Yes Yes   Sig: Take 500 mg by mouth 2 (two) times a day with meals   timolol (TIMOPTIC) 0 5 % ophthalmic solution 1/10/2019 at Unknown time Self Yes Yes   verapamil (CALAN) 80 mg tablet 1/10/2019 at Unknown time Self Yes Yes   Sig: Take 80 mg by mouth daily        Facility-Administered Medications: None       Past Medical History:   Diagnosis Date    Diabetes mellitus ([facility] [address] )        History reviewed  No pertinent surgical history  Family History   Problem Relation Age of Onset    Heart disease Mother      I have reviewed and agree with the history as documented  Social History   Substance Use Topics    Smoking status: Former Smoker    Smokeless tobacco: Former User    Alcohol use No        Review of Systems   Respiratory: Positive for shortness of breath  Cardiovascular: Positive for leg swelling  Negative for chest pain  All other systems reviewed and are negative  Physical Exam  Physical Exam   Constitutional: He is oriented to person, place, and time  No distress  HENT:   Mouth/Throat: Oropharynx is clear and moist    Eyes: Pupils are equal, round, and reactive to light  Neck: Normal range of motion  Neck supple  JVD present  Cardiovascular: Normal rate, regular rhythm and intact distal pulses  Pulmonary/Chest: He is in respiratory distress  He has wheezes  He has rales  moderate respiratory distress with tachypnea and increased work of breathing, decreased breaths sounds bases with rales and wheezing in the upper lung fields  Abdominal: Soft  There is no tenderness  Musculoskeletal: He exhibits edema  Neurological: He is alert and oriented to person, place, and time  Skin: Capillary refill takes less than 2 seconds  He is not diaphoretic  Nursing note and vitals reviewed        Vital Signs  ED Triage Vitals   Temperature Pulse Respirations Blood Pressure SpO2   01/10/19 1716 01/10/19 1710 01/10/19 1710 01/10/19 1716 01/10/19 1710   97 7 °F (36 5 °C) 87 (!) 29 (!) 167/111 100 %      Temp Source Heart Rate Source Patient Position - Orthostatic VS BP Location FiO2 (%)   01/10/19 2100 01/10/19 1800 01/10/19 1800 01/10/19 1800 --   Tympanic Monitor Sitting Left arm       Pain Score       01/10/19 2113       No Pain           Vitals:    01/11/19 1300 01/11/19 1400 01/11/19 1500 01/11/19 1600   BP:  131/77  145/78   Pulse: 61 74 65 70   Patient Position - Orthostatic VS:           Visual Acuity      ED Medications  Medications   hydrALAZINE (APRESOLINE) injection 5 mg (5 mg Intravenous Given 1/11/19 0521)   heparin (porcine) subcutaneous injection 5,000 Units (5,000 Units Subcutaneous Given 1/11/19 1341)   timolol (TIMOPTIC) 0 5 % ophthalmic solution 1 drop (1 drop Both Eyes Given 1/11/19 1720)   latanoprost (XALATAN) 0 005 % ophthalmic solution 1 drop (1 drop Both Eyes Given 1/10/19 2227)   verapamil (CALAN) tablet 80 mg (80 mg Oral Given 1/11/19 0903)   clobetasol (TEMOVATE) 0 05 % cream (not administered)   colchicine (COLCRYS) tablet 0 3 mg (0 3 mg Oral Given 1/11/19 1720)   insulin lispro (HumaLOG) 100 units/mL subcutaneous injection 1-5 Units (3 Units Subcutaneous Given 1/11/19 1720)   insulin lispro (HumaLOG) 100 units/mL subcutaneous injection 1-5 Units (not administered)   ipratropium-albuterol (DUO-NEB) 0 5-2 5 mg/3 mL inhalation solution 3 mL (3 mL Nebulization Given 1/10/19 1736)   ipratropium-albuterol (DUO-NEB) 0 5-2 5 mg/3 mL inhalation solution 3 mL (3 mL Nebulization Given 1/10/19 1736)   furosemide (LASIX) injection 40 mg (40 mg Intravenous Given 1/10/19 1856)   aspirin chewable tablet 324 mg (324 mg Oral Given 1/10/19 1856)   magnesium sulfate 2 g/50 mL IVPB (premix) 2 g (0 g Intravenous Stopped 1/11/19 0721)   potassium chloride (K-DUR,KLOR-CON) CR tablet 10 mEq (10 mEq Oral Given 1/11/19 0903)   furosemide (LASIX) injection 40 mg (40 mg Intravenous Given 1/11/19 1104)       Diagnostic Studies  Results Reviewed     Procedure Component Value Units Date/Time    UA w Reflex to Microscopic [[id number]]  (Abnormal) Collected:  01/10/19 2826    Lab Status:  Final result Specimen:  Urine from Urine, Clean Catch Updated:  01/11/19 0011     Color, UA Yellow     Clarity, UA Clear     Specific Gravity, UA 1 015     pH, UA 5 5     Leukocytes, UA Negative     Nitrite, UA Negative     Protein, UA 30 (1+) (A) mg/dl      Glucose,  (1/10%) (A) mg/dl      Ketones, UA Negative mg/dl      Urobilinogen, UA 0 2 E U /dl      Bilirubin, UA Negative     Blood, UA Trace-Intact (A)    B-type natriuretic peptide [[id number]]  (Abnormal) Collected:  01/10/19 1731    Lab Status:  Final result Specimen:  Blood from Arm, Left Updated:  01/10/19 1818     NT-proBNP 26,999 (H) pg/mL     Comprehensive metabolic panel [[id number]]  (Abnormal) Collected:  01/10/19 1731    Lab Status:  Final result Specimen:  Blood from Arm, Left Updated:  01/10/19 1818     Sodium 139 mmol/L      Potassium 4 8 mmol/L      Chloride 102 mmol/L      CO2 26 mmol/L      ANION GAP 11 mmol/L      BUN 56 (H) mg/dL      Creatinine 2 43 (H) mg/dL      Glucose 236 (H) mg/dL      Calcium 8 6 mg/dL      AST 21 U/L      ALT 26 U/L      Alkaline Phosphatase 125 (H) U/L      Total Protein 6 4 g/dL      Albumin 3 5 g/dL      Total Bilirubin 0 80 mg/dL      eGFR 24 ml/min/1 73sq m     Narrative:         National Kidney Disease Education Program recommendations are as follows:  GFR calculation is accurate only with a steady state creatinine  Chronic Kidney disease less than 60 ml/min/1 73 sq  meters  Kidney failure less than 15 ml/min/1 73 sq  meters      Troponin I [[id number]]  (Abnormal) Collected:  01/10/19 1731    Lab Status:  Final result Specimen:  Blood from Arm, Left Updated:  01/10/19 1815     Troponin I 0 09 (H) ng/mL     Protime-INR [[id number]]  (Normal) Collected:  01/10/19 1731    Lab Status:  Final result Specimen:  Blood from Arm, Left Updated:  01/10/19 1751     Protime 11 4 seconds      INR 1 09 APTT [[id number]]  (Normal) Collected:  01/10/19 1731    Lab Status:  Final result Specimen:  Blood from Arm, Left Updated:  01/10/19 1751     PTT 31 seconds     CBC and differential [[id number]]  (Abnormal) Collected:  01/10/19 1731    Lab Status:  Final result Specimen:  Blood from Arm, Left Updated:  01/10/19 1736     WBC 7 24 Thousand/uL      RBC 3 58 (L) Million/uL      Hemoglobin 11 6 (L) g/dL      Hematocrit 37 0 %       (H) fL      MCH 32 4 pg      MCHC 31 4 g/dL      RDW 14 2 %      MPV 9 7 fL      Platelets 712 Thousands/uL      nRBC 0 /100 WBCs      Neutrophils Relative 71 %      Immat GRANS % 1 %      Lymphocytes Relative 16 %      Monocytes Relative 12 %      Eosinophils Relative 0 %      Basophils Relative 0 %      Neutrophils Absolute 5 12 Thousands/µL      Immature Grans Absolute 0 04 Thousand/uL      Lymphocytes Absolute 1 17 Thousands/µL      Monocytes Absolute 0 87 Thousand/µL      Eosinophils Absolute 0 02 Thousand/µL      Basophils Absolute 0 02 Thousands/µL                  XR chest 1 view portable   Final Result by Gisselle Cramer MD (01/11 6023)      Enlarged cardiomediastinal silhouette  Pericardial effusion versus cardiomegaly  The study was marked in [facility] for immediate notification              Workstation performed: [id number]                    Procedures  CriticalCare Time  Performed by: Denise Bertrand  Authorized by: Denise Bertrand     Critical care provider statement:     Critical care time (minutes):  40    Critical care time was exclusive of:  Separately billable procedures and treating other patients    Critical care was necessary to treat or prevent imminent or life-threatening deterioration of the following conditions:  Cardiac failure and respiratory failure    Critical care was time spent personally by me on the following activities:  Blood draw for specimens, obtaining history from patient or surrogate, development of treatment plan with patient or surrogate, discussions with consultants, evaluation of patient's response to treatment, examination of patient, ventilator management, review of old charts, re-evaluation of patient's condition, ordering and review of radiographic studies, ordering and review of laboratory studies, ordering and performing treatments and interventions and interpretation of cardiac output measurements           Phone Contacts  ED Phone Contact    ED Course  ED Course as of Jan 11 1749   Thu Saul 10, 2019   [facility] ICU TIM Najera to evaluate the patient  MDM  Number of Diagnoses or Management Options  Acute kidney injury [facility]):   New onset of congestive heart failure [facility]):   Diagnosis management comments: Pulse ox 97% on BiPAP indicating adequate oxygenation  CXR: cardiomegaly with CHF as read by me    Patient was started on BiPAP with improvement in work of breathing  He was started on Lasix IV and Tridil drip which further decreased his work of breathing          Amount and/or Complexity of Data Reviewed  Clinical lab tests: ordered and reviewed  Tests in the radiology section of CPT®: ordered and reviewed  Decide to obtain previous medical records or to obtain history from someone other than the patient: yes  Review and summarize past medical records: yes  Discuss the patient with other providers: yes  Independent visualization of images, tracings, or specimens: yes    Patient Progress  Patient progress: stable    CritCare Time    Disposition  Final diagnoses:   New onset of congestive heart failure ([facility] [address] )   Acute kidney injury ([facility] [address] )     Time reflects when diagnosis was documented in both MDM as applicable and the Disposition within this note     Time User Action Codes Description Comment    1/10/2019  7:40 PM Parish Bellwer [I50 9] New onset of congestive heart failure ([facility] [address] )     1/10/2019  7:40 PM Andrea Bell Add [N17 9] Acute kidney injury ([facility] [address] )     1/11/2019 12:12 AM Ila Carlson Add [I21 4] NSTEMI (non-ST elevated myocardial infarction) [facility])       ED Disposition     ED Disposition Condition Comment    Admit  Case was discussed with Dr George Smith and the patient's admission status was agreed to be admission to the service of Dr George Smith  Follow-up Information    None         Current Discharge Medication List      CONTINUE these medications which have NOT CHANGED    Details   allopurinol (ZYLOPRIM) 100 mg tablet Take 100 mg by mouth 2 (two) times a day      brimonidine-timolol (COMBIGAN) 0 2-0 5 % Administer 1 drop to both eyes every 12 (twelve) hours      furosemide (LASIX) 40 mg tablet Take 40 mg by mouth daily      glyBURIDE (DIABETA) 5 mg tablet Take 5 mg by mouth 2 (two) times a day with meals        halobetasol (ULTRAVATE) 0 05 % cream Apply 1 application topically daily as needed (Ezcema)    Refills: 0      hydrochlorothiazide (HYDRODIURIL) 25 mg tablet Take 1 tablet by mouth daily  Refills: 0      irbesartan (AVAPRO) 300 mg tablet Take 300 mg by mouth daily at bedtime      latanoprost (XALATAN) 0 005 % ophthalmic solution 1 drop daily at bedtime      metFORMIN (GLUCOPHAGE) 500 mg tablet Take 500 mg by mouth 2 (two) times a day with meals      timolol (TIMOPTIC) 0 5 % ophthalmic solution Refills: 1      verapamil (CALAN) 80 mg tablet Take 80 mg by mouth daily    Refills: 0      ALPRAZolam (XANAX) 0 5 mg tablet Refills: 0           No discharge procedures on file      ED Provider  Electronically Signed by           Henry Pelletier DO  01/11/19 4596

## 2019-01-11 NOTE — ASSESSMENT & PLAN NOTE
- likely etiology prerenal secondary to cardiorenal syndrome as well as metformin, and ARB use  - will hold metformin, ARB, hydrochlorothiazide for now  - follow daily with renal function panel, that is pending at time of this note  - nephrology consultation if renal function worsens

## 2019-01-11 NOTE — PLAN OF CARE
Problem: DISCHARGE PLANNING - CARE MANAGEMENT  Goal: Discharge to post-acute care or home with appropriate resources  INTERVENTIONS:  - Conduct assessment to determine patient/family and health care team treatment goals, and need for post-acute services based on payer coverage, community resources, and patient preferences, and barriers to discharge  - Address psychosocial, clinical, and financial barriers to discharge as identified in assessment in conjunction with the patient/family and health care team  - Arrange appropriate level of post-acute services according to patient's   needs and preference and payer coverage in collaboration with the physician and health care team  - Communicate with and update the patient/family, physician, and health care team regarding progress on the discharge plan  - Arrange appropriate transportation to post-acute venues  Home independently  Outcome: Progressing

## 2019-01-11 NOTE — PLAN OF CARE
CARDIOVASCULAR - ADULT     Maintains optimal cardiac output and hemodynamic stability Progressing     Absence of cardiac dysrhythmias or at baseline rhythm Progressing        Knowledge Deficit     Patient/family/caregiver demonstrates understanding of disease process, treatment plan, medications, and discharge instructions Progressing        METABOLIC, FLUID AND ELECTROLYTES - ADULT     Electrolytes maintained within normal limits Progressing     Fluid balance maintained Progressing     Glucose maintained within target range Progressing        Nutrition/Hydration-ADULT     Nutrient/Hydration intake appropriate for improving, restoring or maintaining nutritional needs Progressing        Potential for Falls     Patient will remain free of falls Progressing        RESPIRATORY - ADULT     Achieves optimal ventilation and oxygenation Progressing        SAFETY ADULT     Patient will remain free of falls Progressing     Maintain or return to baseline ADL function Progressing     Maintain or return mobility status to optimal level Progressing          CARDIOVASCULAR - ADULT     Maintains optimal cardiac output and hemodynamic stability Progressing     Absence of cardiac dysrhythmias or at baseline rhythm Progressing        Knowledge Deficit     Patient/family/caregiver demonstrates understanding of disease process, treatment plan, medications, and discharge instructions Progressing        METABOLIC, FLUID AND ELECTROLYTES - ADULT     Electrolytes maintained within normal limits Progressing     Fluid balance maintained Progressing     Glucose maintained within target range Progressing        Nutrition/Hydration-ADULT     Nutrient/Hydration intake appropriate for improving, restoring or maintaining nutritional needs Progressing        Potential for Falls     Patient will remain free of falls Progressing        RESPIRATORY - ADULT     Achieves optimal ventilation and oxygenation Progressing        SAFETY ADULT     Patient will remain free of falls Progressing     Maintain or return to baseline ADL function Progressing     Maintain or return mobility status to optimal level Progressing

## 2019-01-11 NOTE — ASSESSMENT & PLAN NOTE
- likely type 2 secondary to congestive heart failure  - will trend  - will place cardiology consultation  - will obtain echocardiogram

## 2019-01-11 NOTE — UTILIZATION REVIEW
Initial Clinical Review    Admission: Date/Time/Statement: 1/10/19 @ 1942   Orders Placed This Encounter   Procedures    Inpatient Admission (expected length of stay for this patient is greater than two midnights)     Standing Status:   Standing     Number of Occurrences:   1     Order Specific Question:   Admitting Physician     Answer:   Natanael Vines [[id number]]     Order Specific Question:   Level of Care     Answer:   Level 1 Stepdown [13]     Order Specific Question:   Estimated length of stay     Answer:   More than 2 Midnights     Order Specific Question:   Certification     Answer:   I certify that inpatient services are medically necessary for this patient for a duration of greater than two midnights  See H&P and MD Progress Notes for additional information about the patient's course of treatment  ED: Date/Time/Mode of Arrival:   ED Arrival Information     Expected Arrival Acuity Means of Arrival Escorted By Service Admission Type    - 1/10/2019 17:05 Urgent Walk-In Family Member Critical Care/ICU Urgent    Arrival Complaint    shortness of breath        Chief Complaint:   Chief Complaint   Patient presents with    Shortness of Breath     pt presents to the ed with increased sob x 1 wk  pt states it became worse today      History of Illness:   [de-identified] y o  male who presents to the [address] [place] [address] Emergency Department on 01/10 with several week history of worsening shortness of breath  He came to emergency department today when symptoms significantly worsened  He denied sick contacts, fevers, chills, cough  He has a past medical history of gout, hypertension, diabetes, and lower extremity edema, but no formal congestive heart failure or chronic kidney disease history    On arrival to emergency department, patient was significantly tachypneic in the high 20s low 30s, sats in the 90s and was significantly hypertensive with systolic blood pressure close to 200, so patient was placed on BiPAP, nitro drip, and diuresed with 40 mg of IV Lasix  ED Vital Signs:   ED Triage Vitals   Temperature Pulse Respirations Blood Pressure SpO2   01/10/19 1716 01/10/19 1710 01/10/19 1710 01/10/19 1716 01/10/19 1710   97 7 °F (36 5 °C) 87 (!) 29 (!) 167/111 100 %      Temp Source Heart Rate Source Patient Position - Orthostatic VS BP Location FiO2 (%)   01/10/19 2100 01/10/19 1800 01/10/19 1800 01/10/19 1800 --   Tympanic Monitor Sitting Left arm       Pain Score       01/10/19 2113       No Pain        Wt Readings from Last 1 Encounters:   01/11/19 72 3 kg (159 lb 6 3 oz)     Vital Signs (abnormal):   RR 24, 32, 24, 26  /89, 171/79  Pertinent Labs/Diagnostic Test Results:     ED Treatment:   Medication Administration from 01/10/2019 1704 to 01/10/2019 2039       Date/Time Order Dose Route Action Action by Comments     01/10/2019 1736 ipratropium-albuterol (DUO-NEB) 0 5-2 5 mg/3 mL inhalation solution 3 mL 3 mL Nebulization Given Checo Lara RN      01/10/2019 1736 ipratropium-albuterol (DUO-NEB) 0 5-2 5 mg/3 mL inhalation solution 3 mL 3 mL Nebulization Given Checo Lara RN      01/10/2019 1856 furosemide (LASIX) injection 40 mg 40 mg Intravenous Given Ryan Gross RN      01/10/2019 1856 aspirin chewable tablet 324 mg 324 mg Oral Given Ryan Gross RN      01/10/2019 1951 nitroGLYcerin (TRIDIL) 50 mg in 250 mL infusion (premix) 80 mcg/min Intravenous Rate/Dose Change Leyla Robles RN      01/10/2019 1946 nitroGLYcerin (TRIDIL) 50 mg in 250 mL infusion (premix) 75 mcg/min Intravenous Rate/Dose Change Leyla Robles RN      01/10/2019 1938 nitroGLYcerin (TRIDIL) 50 mg in 250 mL infusion (premix) 70 mcg/min Intravenous Rate/Dose Change Leyla Robles RN      01/10/2019 1932 nitroGLYcerin (TRIDIL) 50 mg in 250 mL infusion (premix) 65 mcg/min Intravenous Rate/Dose Change Leyla Robels RN      01/10/2019 1915 nitroGLYcerin (TRIDIL) 50 mg in 250 mL infusion (premix) 55 mcg/min Intravenous Rate/Dose Change Amelie Staley RN      01/10/2019 1900 nitroGLYcerin (TRIDIL) 50 mg in 250 mL infusion (premix) 50 mcg/min Intravenous New Bag Amelie Staley RN         Past Medical/Surgical History: Active Ambulatory Problems     Diagnosis Date Noted    Closed traumatic displaced fracture of distal end of left radius with malunion 02/09/2018    PVC (premature ventricular contraction) 12/19/2018    Essential hypertension 12/19/2018     Resolved Ambulatory Problems     Diagnosis Date Noted    No Resolved Ambulatory Problems     Past Medical History:   Diagnosis Date    Diabetes mellitus ([facility] [address] )      Admitting Diagnosis: Shortness of breath [R06 02]  Acute kidney injury ([facility] [address] ) [N17 9]  New onset of congestive heart failure ([facility] [address] ) [I50 9]  Age/Sex: [de-identified] y o  male  Assessment/Plan:   [de-identified] y o  male who presents with several week history of worsening shortness of breath  He came to emergency department today when symptoms significantly worsened; patient was significantly tachypneic in the high 20s low 30s, sats in the 90s and was significantly hypertensive with systolic blood pressure close to 200, so patient was placed on BiPAP, nitro drip, and diuresed with 40 mg of IV Lasix  Chest x-ray was indicative of vascular congestion  Laboratory showed a creatinine of 2 43, [place] [address], and troponin of 0 09  Due to need for noninvasive positive pressure ventilation as well as nitro drip, he was referred for step-down admission    Admission Orders:  ICU LEVEL 1 STEPDOWN  BIPAP  ACCUCHECKS WITH COVERAGE SCALE  FALL PRECAUTIONS  NEURO CHECKS Q4H  INCENTIVE SPIROMETRY  VENODYNES  CONSULT CARDIO  Scheduled Meds:   Current Facility-Administered Medications:  clobetasol  Topical Daily PRN Reji Leos PA-C   heparin (porcine) 5,000 Units Subcutaneous [facility] Reji Leos PA-C   hydrALAZINE 5 mg Intravenous Q6H PRN Reji Leos PA-C   insulin lispro 1-6 Units Subcutaneous Q6H [address] Reji Leos PA-C   latanoprost 1 drop Both Eyes HS Lieutenant Jorge PA-C   timolol 1 drop Both Eyes BID Lieutenant Jorge PA-C   verapamil 80 mg Oral Daily Lieutenant Jorge PA-C     Continuous Infusions:    PRN Meds:   clobetasol    hydrALAZINE

## 2019-01-11 NOTE — ED NOTES
Per dr thurman @ bedside, maintain NTG infusion @ current rate: 80mcg/min  Pump lockout activated       Santa Loya RN  01/10/19 Shira Robles RN  01/10/19 2051

## 2019-01-11 NOTE — ASSESSMENT & PLAN NOTE
No results found for: HGBA1C    No results for input(s): POCGLU in the last 72 hours      Blood Sugar Average: Last 72 hrs:       - hold metformin and glyburide for now while inpatient, will place on sliding scale insulin

## 2019-01-11 NOTE — PLAN OF CARE
CARDIOVASCULAR - ADULT     Maintains optimal cardiac output and hemodynamic stability Progressing     Absence of cardiac dysrhythmias or at baseline rhythm Progressing        Knowledge Deficit     Patient/family/caregiver demonstrates understanding of disease process, treatment plan, medications, and discharge instructions Progressing        METABOLIC, FLUID AND ELECTROLYTES - ADULT     Electrolytes maintained within normal limits Progressing     Fluid balance maintained Progressing     Glucose maintained within target range Progressing        RESPIRATORY - ADULT     Achieves optimal ventilation and oxygenation Progressing        SAFETY ADULT     Patient will remain free of falls Progressing     Maintain or return to baseline ADL function Progressing     Maintain or return mobility status to optimal level Progressing

## 2019-01-11 NOTE — CONSULTS
ICU CARDIOLOGY CONSULTATION  Pablo Nevarez [de-identified] y o  male MRN: [id number]  Unit/Bed#: ICU 08 Encounter: [id number]      History of Present Illness   Physician Requesting Consult: Iesha Mtz MD  Reason for Consult / Principal Problem:  Dyspnea    Assessment/Plan   Acute respiratory failure with hypoxia status post BiPAP- currently off BiPAP    Acute biventricular heart failure- moderate LV failure 35-40% plus mild RV failure- possible ischemic origin? History of diabetes mellitus long-term? Possible viral- denies having any prodrome? Frequent PVCs- PVC mediated cardiomyopathy? Agree with IV diuresis  Consideration for stress test versus cardiac catheterization when improved volume status  Moderate pericardial effusion- echo without evidence of tamponade physiology  No history of malignancy  Denies having viral prodrome? Avoid NSAIDs in the setting of acute kidney injury  Renally dosed colchicine 0 3 mg daily  Limited echo on Monday  If with worsening hemodynamics consideration for pericardiocentesis  Acute on chronic kidney injury- component secondary to decompensated heart failure  Likely with a component of chronic kidney disease from his diabetes mellitus  Type 2 diabetes mellitus    HPI: Pablo Nevarez is a [de-identified]y o  year old male history of diabetes mellitus, PVCs admitted with several weeks of shortness of breath  He reports having PND and orthopnea  He reports having increased lower extremity edema  He denies having any viral prodrome  He denied having chest pain  He reports having some palpitations  Denies having prior history of myocardial infarction  Denies having any prior history of stroke  Compliant with his outpatient medications  In the ED was noted to have significantly elevated blood pressure  He was in respiratory distress  He was put in BiPAP therapy  He was given IV Lasix with improvement        Historical Information   Past Medical History:   Diagnosis Date    Diabetes mellitus ([facility] [address] )      History reviewed  No pertinent surgical history  History   Alcohol Use No     History   Drug Use No     History   Smoking Status    Former Smoker   Smokeless Tobacco    Former User     Family History   Problem Relation Age of Onset    Heart disease Mother        Meds/Allergies   Prior to Admission medications    Medication Sig Start Date End Date Taking?  Authorizing Provider   allopurinol (ZYLOPRIM) 100 mg tablet Take 100 mg by mouth 2 (two) times a day   Yes Historical Provider, MD   brimonidine-timolol (COMBIGAN) 0 2-0 5 % Administer 1 drop to both eyes every 12 (twelve) hours   Yes Historical Provider, MD   furosemide (LASIX) 40 mg tablet Take 40 mg by mouth daily   Yes Historical Provider, MD   glyBURIDE (DIABETA) 5 mg tablet Take 5 mg by mouth 2 (two) times a day with meals     Yes Historical Provider, MD   halobetasol (ULTRAVATE) 0 05 % cream Apply 1 application topically daily as needed Deeanna Closs)   11/1/18  Yes Historical Provider, MD   hydrochlorothiazide (HYDRODIURIL) 25 mg tablet Take 1 tablet by mouth daily 12/13/18  Yes Historical Provider, MD   irbesartan (AVAPRO) 300 mg tablet Take 300 mg by mouth daily at bedtime   Yes Historical Provider, MD   latanoprost (XALATAN) 0 005 % ophthalmic solution 1 drop daily at bedtime   Yes Historical Provider, MD   metFORMIN (GLUCOPHAGE) 500 mg tablet Take 500 mg by mouth 2 (two) times a day with meals   Yes Historical Provider, MD   timolol (TIMOPTIC) 0 5 % ophthalmic solution  9/23/18  Yes Historical Provider, MD   verapamil (CALAN) 80 mg tablet Take 80 mg by mouth daily   3/9/18  Yes Historical Provider, MD   ALPRAZolam Karilyn Meline) 0 5 mg tablet  3/12/18   Historical Provider, MD     Current Facility-Administered Medications   Medication Dose Route Frequency Provider Last Rate Last Dose    clobetasol (TEMOVATE) 0 05 % cream   Topical Daily RONAK Fountain PA-C        heparin (porcine) subcutaneous injection 5,000 Units  5,000 Units Subcutaneous [facility] Alli Weinstein, [place]   5,000 Units at 01/11/19 0522    hydrALAZINE (APRESOLINE) injection 5 mg  5 mg Intravenous Q6H PRN Alli Weinstein PA-C   5 mg at 01/11/19 0521    insulin lispro (HumaLOG) 100 units/mL subcutaneous injection 1-6 Units  1-6 Units Subcutaneous [facility] Alli Weinstein PA-C   1 Units at 01/11/19 0000    latanoprost (XALATAN) 0 005 % ophthalmic solution 1 drop  1 drop Both Eyes HS TIM Musa-AYDEE   1 drop at 01/10/19 2227    timolol (TIMOPTIC) 0 5 % ophthalmic solution 1 drop  1 drop Both Eyes BID Alli Weinstein PA-C   1 drop at 01/11/19 7982    verapamil (CALAN) tablet 80 mg  80 mg Oral Daily Alli Weinstein PA-C   80 mg at 01/11/19 4486     Allergies   Allergen Reactions    Sulfa Antibiotics     Sulfur        Review of systems  CONSTITUTIONAL:  As per HPI  HEENT:  Eyes:  No visual loss, blurred vision, double vision or yellow sclerae  Ears, Nose, Throat:  No hearing loss, sneezing, congestion, runny nose or sore throat  SKIN:  No rash or itching  CARDIOVASCULAR:  As per HPI  RESPIRATORY:  No shortness of breath, cough or sputum  GASTROINTESTINAL:  No anorexia, nausea, vomiting or diarrhea  No abdominal pain or blood  GENITOURINARY:  Burning on urination  No flank pain  NEUROLOGICAL:  No headache, dizziness, syncope, paralysis, ataxia, numbness or tingling in the extremities  No change in bowel or bladder control  MUSCULOSKELETAL:  No muscle, back pain, joint pain or stiffness  HEMATOLOGIC:  No anemia, bleeding or bruising  LYMPHATICS:  No enlarged nodes  No history of splenectomy  PSYCHIATRIC:  No active suicidal or homicidal ideation  ENDOCRINOLOGIC:  No reports of sweating, cold or heat intolerance  No polyuria or polydipsia  ALLERGIES:  No history of asthma, hives, eczema or rhinitis  More than 10 systems reviewed and otherwise noncontributory  Objective   Vitals: Blood pressure 155/74, pulse 73, temperature (!) 97 1 °F (36 2 °C), temperature source Temporal, resp  rate (!) 23, height 5' 9" (1 753 m), weight 72 3 kg (159 lb 6 3 oz), SpO2 97 %  Physical Exam   Constitutional: He is oriented to person, place, and time  No distress  HENT:   Head: Normocephalic and atraumatic  Right Ear: External ear normal    Left Ear: External ear normal    Nose: Nose normal    Mouth/Throat: No oropharyngeal exudate  Eyes: Pupils are equal, round, and reactive to light  Conjunctivae are normal  Right eye exhibits no discharge  Left eye exhibits no discharge  No scleral icterus  Neck: Normal range of motion  JVD present  No tracheal deviation present  No thyromegaly present  Cardiovascular: Normal rate, regular rhythm and intact distal pulses  Exam reveals gallop  Exam reveals no friction rub  Murmur heard  Pulmonary/Chest: Effort normal  No stridor  No respiratory distress  He has no wheezes  He has rales  He exhibits no tenderness  Abdominal: Soft  Bowel sounds are normal  He exhibits distension  He exhibits no mass  There is no tenderness  There is no rebound and no guarding  Genitourinary:   Genitourinary Comments: No CVA tenderness   Musculoskeletal: He exhibits edema and deformity  He exhibits no tenderness  Neurological: He is alert and oriented to person, place, and time  He displays normal reflexes  He exhibits normal muscle tone  Skin: Skin is warm and dry  No rash noted  He is not diaphoretic  No erythema  Psychiatric: He has a normal mood and affect  His behavior is normal  Judgment and thought content normal    Nursing note and vitals reviewed        Recent Results (from the past 24 hour(s))   CBC and differential    Collection Time: 01/10/19  5:31 PM   Result Value Ref Range    WBC 7 24 4 31 - 10 16 Thousand/uL    RBC 3 58 (L) 3 88 - 5 62 Million/uL    Hemoglobin 11 6 (L) 12 0 - 17 0 g/dL    Hematocrit 37 0 36 5 - 49 3 %     (H) 82 - 98 fL    MCH 32 4 26 8 - 34 3 pg    MCHC 31 4 31 4 - 37 4 g/dL    RDW 14 2 11 6 - 15 1 %    MPV 9 7 8 9 - 12 7 fL Platelets 359 983 - 260 Thousands/uL    nRBC 0 /100 WBCs    Neutrophils Relative 71 43 - 75 %    Immat GRANS % 1 0 - 2 %    Lymphocytes Relative 16 14 - 44 %    Monocytes Relative 12 4 - 12 %    Eosinophils Relative 0 0 - 6 %    Basophils Relative 0 0 - 1 %    Neutrophils Absolute 5 12 1 85 - 7 62 Thousands/µL    Immature Grans Absolute 0 04 0 00 - 0 20 Thousand/uL    Lymphocytes Absolute 1 17 0 60 - 4 47 Thousands/µL    Monocytes Absolute 0 87 0 17 - 1 22 Thousand/µL    Eosinophils Absolute 0 02 0 00 - 0 61 Thousand/µL    Basophils Absolute 0 02 0 00 - 0 10 Thousands/µL   Protime-INR    Collection Time: 01/10/19  5:31 PM   Result Value Ref Range    Protime 11 4 9 4 - 11 7 seconds    INR 1 09 0 86 - 1 16   APTT    Collection Time: 01/10/19  5:31 PM   Result Value Ref Range    PTT 31 26 - 38 seconds   Troponin I    Collection Time: 01/10/19  5:31 PM   Result Value Ref Range    Troponin I 0 09 (H) <=0 04 ng/mL   B-type natriuretic peptide    Collection Time: 01/10/19  5:31 PM   Result Value Ref Range    NT-proBNP 26,999 (H) <450 pg/mL   Comprehensive metabolic panel    Collection Time: 01/10/19  5:31 PM   Result Value Ref Range    Sodium 139 136 - 145 mmol/L    Potassium 4 8 3 5 - 5 3 mmol/L    Chloride 102 100 - 108 mmol/L    CO2 26 21 - 32 mmol/L    ANION GAP 11 4 - 13 mmol/L    BUN 56 (H) 5 - 25 mg/dL    Creatinine 2 43 (H) 0 60 - 1 30 mg/dL    Glucose 236 (H) 65 - 140 mg/dL    Calcium 8 6 8 3 - 10 1 mg/dL    AST 21 5 - 45 U/L    ALT 26 12 - 78 U/L    Alkaline Phosphatase 125 (H) 46 - 116 U/L    Total Protein 6 4 6 4 - 8 2 g/dL    Albumin 3 5 3 5 - 5 0 g/dL    Total Bilirubin 0 80 0 20 - 1 00 mg/dL    eGFR 24 ml/min/1 73sq m   Hemoglobin A1c w/EAG Estimation (Orders if not completed within the last 90 days)    Collection Time: 01/10/19  8:58 PM   Result Value Ref Range    Hemoglobin A1C 7 4 (H) 4 2 - 6 3 %     mg/dl   Troponin I    Collection Time: 01/10/19  8:58 PM   Result Value Ref Range    Troponin I 0 11 (H) <=0 04 ng/mL   Troponin I    Collection Time: 01/10/19 11:53 PM   Result Value Ref Range    Troponin I 0 12 (H) <=0 04 ng/mL   Fingerstick Glucose (POCT)    Collection Time: 01/10/19 11:55 PM   Result Value Ref Range    POC Glucose 188 (H) 65 - 140 mg/dl   UA w Reflex to Microscopic    Collection Time: 01/10/19 11:56 PM   Result Value Ref Range    Color, UA Yellow     Clarity, UA Clear     Specific [place], UA 1 015 1 000 - 1 030    pH, UA 5 5 5 0 - 9 0    Leukocytes, UA Negative Negative    Nitrite, UA Negative Negative    Protein, UA 30 (1+) (A) Negative mg/dl    Glucose,  (1/10%) (A) Negative mg/dl    Ketones, UA Negative Negative mg/dl    Urobilinogen, UA 0 2 0 2, 1 0 E U /dl E U /dl    Bilirubin, UA Negative Negative    Blood, UA Trace-Intact (A) Negative   Urea nitrogen, urine    Collection Time: 01/10/19 11:56 PM   Result Value Ref Range    Urea Nitrogen, Ur 425 mg/dL   Creatinine, urine, random    Collection Time: 01/10/19 11:56 PM   Result Value Ref Range    Creatinine, Ur 55 2 mg/dL   Urine Microscopic    Collection Time: 01/10/19 11:56 PM   Result Value Ref Range    RBC, UA 0-1 (A) None Seen, 0-5 /hpf    WBC, UA None Seen None Seen, 0-5, 5-55, 5-65 /hpf    Epithelial Cells None Seen None Seen, Occasional /hpf    Bacteria, UA Occasional None Seen, Occasional /hpf   Troponin I    Collection Time: 01/11/19  4:50 AM   Result Value Ref Range    Troponin I 0 10 (H) <=0 04 ng/mL   Comprehensive metabolic panel    Collection Time: 01/11/19  4:50 AM   Result Value Ref Range    Sodium 139 136 - 145 mmol/L    Potassium 3 9 3 5 - 5 3 mmol/L    Chloride 104 100 - 108 mmol/L    CO2 26 21 - 32 mmol/L    ANION GAP 9 4 - 13 mmol/L    BUN 56 (H) 5 - 25 mg/dL    Creatinine 2 30 (H) 0 60 - 1 30 mg/dL    Glucose 83 65 - 140 mg/dL    Calcium 8 3 8 3 - 10 1 mg/dL    AST 17 5 - 45 U/L    ALT 24 12 - 78 U/L    Alkaline Phosphatase 105 46 - 116 U/L    Total Protein 5 8 (L) 6 4 - 8 2 g/dL    Albumin 3 1 (L) 3 5 - 5 0 g/dL    Total Bilirubin 0 80 0 20 - 1 00 mg/dL    eGFR 26 ml/min/1 73sq m   CBC and differential    Collection Time: 01/11/19  4:50 AM   Result Value Ref Range    WBC 5 91 4 31 - 10 16 Thousand/uL    RBC 3 22 (L) 3 88 - 5 62 Million/uL    Hemoglobin 10 6 (L) 12 0 - 17 0 g/dL    Hematocrit 32 3 (L) 36 5 - 49 3 %     (H) 82 - 98 fL    MCH 32 9 26 8 - 34 3 pg    MCHC 32 8 31 4 - 37 4 g/dL    RDW 14 5 11 6 - 15 1 %    MPV 9 6 8 9 - 12 7 fL    Platelets 723 (L) 364 - 390 Thousands/uL    Neutrophils Relative 65 43 - 75 %    Lymphocytes Relative 19 14 - 44 %    Monocytes Relative 15 (H) 4 - 12 %    Eosinophils Relative 1 0 - 6 %    Basophils Relative 0 0 - 1 %    Neutrophils Absolute 3 86 1 85 - 7 62 Thousands/µL    Lymphocytes Absolute 1 13 0 60 - 4 47 Thousands/µL    Monocytes Absolute 0 87 0 17 - 1 22 Thousand/µL    Eosinophils Absolute 0 03 0 00 - 0 61 Thousand/µL    Basophils Absolute 0 02 0 00 - 0 10 Thousands/µL   Magnesium    Collection Time: 01/11/19  4:50 AM   Result Value Ref Range    Magnesium 1 6 1 6 - 2 6 mg/dL   Phosphorus    Collection Time: 01/11/19  4:50 AM   Result Value Ref Range    Phosphorus 3 2 2 3 - 4 1 mg/dL   Protime-INR    Collection Time: 01/11/19  4:50 AM   Result Value Ref Range    Protime 11 5 9 4 - 11 7 seconds    INR 1 10 0 86 - 1 16     Imaging: I have personally reviewed pertinent films in PACS    Cardiac testing:   No results found for this or any previous visit  EKG: scanned    Counseling / Coordination of Care  Total time spent today 75 minutes  Greater than 50% of total time was spent with the patient and / or family counseling and / or coordination of care  Elise Kline MD [facility]      "This note has been constructed using a voice recognition system  Therefore there may be syntax, spelling, and/or grammatical errors   Please call if you have any questions  "

## 2019-01-11 NOTE — PROGRESS NOTES
DASH discussion completed  Discussed goals of making sure pt's needs are met upon discharge, pt's preferences are taken into account, pt understands her health condition, medications and symptoms to watch for after returning home and pt is aware of any follow up appointments recommended by hospital physician         01/11/19 [address] [facility] of Residence [facility]    Patient Information   Mental Status Alert   Primary Caregiver Self   Support System Immediate family   Legal Information   Advance Directives Power of  for health care   Advance Directives Status (requested copy, advised to talk to his son)   Activities of Daily Living Prior to Admission   Functional Status Assistive device   Assistive Device No device needed   Living Arrangement House   Ambulation Independent   Means of Transportation   Means of Transport [facility] [place] [place] Self     CM spoke with the pt at the bedside  Pt lives with his son and Sister in [place] at home  He has no DME or HHC  Pt does drive and is independent with his own care  Pt does not have any oxygen on neb equipment at home  Pt noted no DCP needs at this time  He has a POA but not sure if it is for Healthcare, advised to contact his son and follow up with Advanced Directive needs    Pt uses the [employer] in [place], [place]

## 2019-01-11 NOTE — ASSESSMENT & PLAN NOTE
- patient without history of congestive heart failure, though no echo in our system  - does have history of lower extremity swelling is on Lasix and hydrochlorothiazide at home  - could be secondary to dietary indiscretion over holiday season, cold weather, and patient being restarted on propranolol in December, as well as pericardial effusion  - diurese today  - cardiology following

## 2019-01-12 LAB
ANION GAP SERPL CALCULATED.3IONS-SCNC: 9 MMOL/L (ref 4–13)
BUN SERPL-MCNC: 53 MG/DL (ref 5–25)
CALCIUM SERPL-MCNC: 8.5 MG/DL (ref 8.3–10.1)
CHLORIDE SERPL-SCNC: 102 MMOL/L (ref 100–108)
CO2 SERPL-SCNC: 26 MMOL/L (ref 21–32)
CREAT SERPL-MCNC: 2.29 MG/DL (ref 0.6–1.3)
GFR SERPL CREATININE-BSD FRML MDRD: 26 ML/MIN/1.73SQ M
GLUCOSE SERPL-MCNC: 137 MG/DL (ref 65–140)
GLUCOSE SERPL-MCNC: 159 MG/DL (ref 65–140)
GLUCOSE SERPL-MCNC: 262 MG/DL (ref 65–140)
GLUCOSE SERPL-MCNC: 88 MG/DL (ref 65–140)
GLUCOSE SERPL-MCNC: 98 MG/DL (ref 65–140)
MAGNESIUM SERPL-MCNC: 2.3 MG/DL (ref 1.6–2.6)
MRSA NOSE QL CULT: NORMAL
PHOSPHATE SERPL-MCNC: 3.2 MG/DL (ref 2.3–4.1)
POTASSIUM SERPL-SCNC: 4.3 MMOL/L (ref 3.5–5.3)
SODIUM SERPL-SCNC: 137 MMOL/L (ref 136–145)

## 2019-01-12 PROCEDURE — 84100 ASSAY OF PHOSPHORUS: CPT | Performed by: NURSE PRACTITIONER

## 2019-01-12 PROCEDURE — 83735 ASSAY OF MAGNESIUM: CPT | Performed by: NURSE PRACTITIONER

## 2019-01-12 PROCEDURE — 80048 BASIC METABOLIC PNL TOTAL CA: CPT | Performed by: NURSE PRACTITIONER

## 2019-01-12 PROCEDURE — 82948 REAGENT STRIP/BLOOD GLUCOSE: CPT

## 2019-01-12 PROCEDURE — 99232 SBSQ HOSP IP/OBS MODERATE 35: CPT | Performed by: INTERNAL MEDICINE

## 2019-01-12 PROCEDURE — 99233 SBSQ HOSP IP/OBS HIGH 50: CPT | Performed by: INTERNAL MEDICINE

## 2019-01-12 RX ORDER — CARVEDILOL 3.12 MG/1
3.12 TABLET ORAL 2 TIMES DAILY WITH MEALS
Status: DISCONTINUED | OUTPATIENT
Start: 2019-01-12 | End: 2019-01-14

## 2019-01-12 RX ORDER — FUROSEMIDE 10 MG/ML
40 INJECTION INTRAMUSCULAR; INTRAVENOUS DAILY
Status: DISCONTINUED | OUTPATIENT
Start: 2019-01-12 | End: 2019-01-13

## 2019-01-12 RX ADMIN — HEPARIN SODIUM 5000 UNITS: 5000 INJECTION, SOLUTION INTRAVENOUS; SUBCUTANEOUS at 05:30

## 2019-01-12 RX ADMIN — TIMOLOL MALEATE 1 DROP: 5 SOLUTION/ DROPS OPHTHALMIC at 10:35

## 2019-01-12 RX ADMIN — COLCHICINE 0.3 MG: 0.6 TABLET, FILM COATED ORAL at 10:01

## 2019-01-12 RX ADMIN — INSULIN LISPRO 1 UNITS: 100 INJECTION, SOLUTION INTRAVENOUS; SUBCUTANEOUS at 17:02

## 2019-01-12 RX ADMIN — FUROSEMIDE 40 MG: 10 INJECTION, SOLUTION INTRAMUSCULAR; INTRAVENOUS at 12:05

## 2019-01-12 RX ADMIN — TIMOLOL MALEATE 1 DROP: 5 SOLUTION/ DROPS OPHTHALMIC at 17:04

## 2019-01-12 RX ADMIN — INSULIN LISPRO 2 UNITS: 100 INJECTION, SOLUTION INTRAVENOUS; SUBCUTANEOUS at 12:06

## 2019-01-12 RX ADMIN — LATANOPROST 1 DROP: 50 SOLUTION/ DROPS OPHTHALMIC at 22:06

## 2019-01-12 RX ADMIN — HEPARIN SODIUM 5000 UNITS: 5000 INJECTION, SOLUTION INTRAVENOUS; SUBCUTANEOUS at 21:53

## 2019-01-12 RX ADMIN — HEPARIN SODIUM 5000 UNITS: 5000 INJECTION, SOLUTION INTRAVENOUS; SUBCUTANEOUS at 13:19

## 2019-01-12 RX ADMIN — VERAPAMIL HYDROCHLORIDE 80 MG: 80 TABLET, FILM COATED ORAL at 10:33

## 2019-01-12 RX ADMIN — CARVEDILOL 3.12 MG: 3.12 TABLET, FILM COATED ORAL at 17:03

## 2019-01-12 NOTE — PROGRESS NOTES
Daily Progress Note - Critical Care/ Harlene Rubinstein [de-identified] y o  male MRN: [id number]  Unit/Bed#: ICU 08 Encounter: [id number]    ______________________________________________________________________  Assessment:   Principal Problem:    Acute respiratory failure with hypoxia ([facility])  Active Problems:    Acute CHF (congestive heart failure) ([facility])    NSTEMI (non-ST elevated myocardial infarction) ([facility])    KARINA (acute kidney injury) ([facility])    Leg edema    Elevated brain natriuretic peptide (BNP) level    Controlled type 2 diabetes mellitus, without long-term current use of insulin ([facility])  Resolved Problems:    * No resolved hospital problems  *      Controlled type 2 diabetes mellitus, without long-term current use of insulin ([facility])   Assessment & Plan    No results found for: HGBA1C    No results for input(s): POCGLU in the last 72 hours      Blood Sugar Average: Last 72 hrs:       - hold metformin and glyburide for now while inpatient, will place on sliding scale insulin       Elevated brain natriuretic peptide (BNP) level   Assessment & Plan    - IV diuresis  - recheck BNP for discharge when euvolemic     Leg edema   Assessment & Plan    - IV diuresis  - improving     KARINA (acute kidney injury) ([facility] )   Assessment & Plan    - likely etiology prerenal secondary to cardiorenal syndrome as well as metformin, and ARB use  - will hold metformin, ARB, hydrochlorothiazide for now  - follow daily with renal function panel, that is pending at time of this note  - nephrology consultation if renal function worsens     NSTEMI (non-ST elevated myocardial infarction) [facility])   Assessment & Plan    - likely type 2 secondary to congestive heart failure  - will trend  - will place cardiology consultation  - will obtain echocardiogram       Acute CHF (congestive heart failure) ([facility] )   Assessment & Plan    - patient without history of congestive heart failure, though no echo in our system  - does have history of lower extremity swelling is on Lasix and hydrochlorothiazide at home  - could be secondary to dietary indiscretion over holiday season, cold weather, and patient being restarted on propranolol in December, as well as pericardial effusion  - diurese today  - cardiology following     * Acute respiratory failure with hypoxia ([facility] )   Assessment & Plan    - Likely secondary to volume overload secondary to possible underlying undiagnosed, or possibly new congestive heart failure and newly discovered pericardial effusion  - initially was on BiPAP, now on 2 L nasal cannula  - diuresed well with Lasix, would continue  - nitro drip discontinued, p r n   Hydralazine for systolic blood pressure greater than 160  - no signs or symptoms concerning of infection           Plan:    Neuro:   · Regulate sleep/wake cycle    CV:   · Cardiac infusions:  None  · Rhythm: NSR with frequent PVCs  · Follow rhythm on telemetry    Pulm:   · 2 L nasal cannula, wean as tolerated    GI:   · Nutrition/diet plan:  Cardiac diet  · Stress ulcer prophylaxis: No prophylaxis needed  · Bowel regimen: None currently    FEN:   · Fluid/Diuretic plan: Needs diuresis lasix IV  · Goal 24 hour fluid balance:  -1 L as tolerated  · Electrolytes repleted:  Pending at time of this note  · Goal: K >4 0, Mag >2 0, and Phos >3 0    :   · Indwelling Dupont present: no       ID:   · No signs or symptoms of infection  · Trend temps and WBC count    Heme:   · No issues  · Trend hgb and plts    Endo:   · Sliding-scale insulin  · Glycemic control plan: Blood glucose controlled on current regimen    Msk/Skin:  · Mobility goal:  Out of bed  · PT consult: yes  · OT consult: yes  · Frequent turning and off-loading    Family:  · Family updated within 24 hours: yes   · Family meeting planned today: no     Lines:  · Peripheral IVs    VTE Prophylaxis:  · Pharmacologic Prophylaxis: Heparin  · Mechanical Prophylaxis: sequential compression device    Disposition: Transfer to telemetry    Code Status: Level 1 - Full Code    Counseling / Coordination of Care  Total time spent today 43 minutes  Greater than 50% of total time was spent with the patient and / or family counseling and / or coordination of care   ______________________________________________________________________    HPI/24hr events:  Patient noted to have moderate pericardial effusion on echo  No acute issues overnight  Did require nasal cannula placement    ______________________________________________________________________    Physical Exam:   Physical Exam   Constitutional: He is oriented to person, place, and time  He appears well-developed and well-nourished  HENT:   Head: Normocephalic and atraumatic  Eyes: Pupils are equal, round, and reactive to light  EOM are normal    Neck: Normal range of motion  Neck supple  No JVD present  Cardiovascular: Regular rhythm, normal heart sounds and normal pulses  Frequent extrasystoles are present  Exam reveals no gallop  Pulmonary/Chest: Effort normal  He has decreased breath sounds in the right lower field and the left lower field  Abdominal: Soft  Bowel sounds are normal  He exhibits no distension  There is no tenderness  Genitourinary:   Genitourinary Comments: Deferred   Musculoskeletal: Normal range of motion  He exhibits edema (2+ pitting edema)  Neurological: He is alert and oriented to person, place, and time  Skin: Skin is warm and dry  Capillary refill takes less than 2 seconds  He is not diaphoretic  Nursing note and vitals reviewed        ______________________________________________________________________  Vitals:    19 0000 19 0100 19 0200 19 0536   BP: 143/62  142/67    BP Location: Left arm      Pulse: 62 60 60    Resp: (!) 28 (!) 23 21    Temp: 98 8 °F (37 1 °C)      TempSrc: Temporal      SpO2: 99% 99% 99%    Weight:    70 6 kg (155 lb 10 3 oz)   Height:                  Temperature:   Temp (24hrs), Av °F (36 7 °C), Min:97 1 °F (36 2 °C), Max:98 8 °F (37 1 °C)    Current Temperature: 98 8 °F (37 1 °C)    Weights:   IBW: 70 7 kg    Body mass index is 22 98 kg/m²  Weight (last 2 days)     Date/Time   Weight    01/12/19 0536  70 6 (155 65)    01/11/19 0500  72 3 (159 39)    01/10/19 2041  74 3 (163 8)              Hemodynamic Monitoring:  N/A       Non-Invasive/Invasive Ventilation Settings:  Respiratory    Lab Data (Last 4 hours)    None         O2/Vent Data (Last 4 hours)    None              No results found for: PHART, FZL1IEB, PO2ART, LLC3QTP, T4RBAYMQ, BEART, SOURCE  SpO2: SpO2: 99 %, SpO2 Activity: SpO2 Activity: At Rest, SpO2 Device: O2 Device: Nasal cannula    Intake and Outputs:  I/O       01/10 0701 - 01/11 0700 01/11 0701 - 01/12 0700    P  O  120 120    I V  (mL/kg) 88 1 (1 2)     IV Piggyback  50    Total Intake(mL/kg) 208 1 (2 9) 170 (2 4)    Urine (mL/kg/hr) 800 1900 (1 1)    Total Output 800 1900    Net -[phone]                Nutrition:        Diet Orders            Start     Ordered    01/11/19 1231  Diet Cardiovascular; Cardiac  Diet effective now     Question Answer Comment   Diet Type Cardiovascular    Cardiac Cardiac    RD to adjust diet per protocol?  Yes        01/11/19 1230    01/11/19 0813  Room Service  Once     Question:  Type of Service  Answer:  Room Service - Appropriate with Assistance    01/11/19 0812          Labs:     Results from last 7 days  Lab Units 01/11/19  0450 01/10/19  1731   WBC Thousand/uL 5 91 7 24   HEMOGLOBIN g/dL 10 6* 11 6*   HEMATOCRIT % 32 3* 37 0   PLATELETS Thousands/uL 123* 157   NEUTROS PCT % 65 71   MONOS PCT % 15* 12       Results from last 7 days  Lab Units 01/11/19  2222 01/11/19  0450 01/10/19  1731   SODIUM mmol/L 137 139 139   POTASSIUM mmol/L 4 8 3 9 4 8   CHLORIDE mmol/L 101 104 102   CO2 mmol/L 27 26 26   BUN mg/dL 57* 56* 56*   CREATININE mg/dL 2 41* 2 30* 2 43*   CALCIUM mg/dL 8 6 8 3 8 6   ALK PHOS U/L  --  105 125*   ALT U/L  --  24 26   AST U/L  --  17 21       Results from last 7 days  Lab Units 01/11/19  2222 01/11/19  0450   MAGNESIUM mg/dL 2 1 1 6     Lab Results   Component Value Date    PHOS 3 2 01/11/2019        Results from last 7 days  Lab Units 01/11/19  0450 01/10/19  1731   INR  1 10 1 09   PTT seconds  --  31       0  Lab Value Date/Time   TROPONINI 0 10 (H) 01/11/2019 0450   TROPONINI 0 12 (H) 01/10/2019 2353   TROPONINI 0 11 (H) 01/10/2019 2058   TROPONINI 0 09 (H) 01/10/2019 1731         ABG:No results found for: PHART, NPG7UPC, PO2ART, ZWM4JBI, S6AULTFL, BEART, SOURCE    Imaging:  I personally reviewed the images  EKG:  Reviewed    Micro:  No results found for: Demi Shine, SPUTUMCULTUR    Allergies: Allergies   Allergen Reactions    Sulfa Antibiotics     Sulfur      Medications:   Scheduled Meds:  Current Facility-Administered Medications:  clobetasol  Topical Daily PRN Nava Macwilliam, PA-C   colchicine 0 3 mg Oral Daily [facility], SUSANA   heparin (porcine) 5,000 Units Subcutaneous [facility] Nava Macleani, PA-C   hydrALAZINE 5 mg Intravenous Q6H PRN [place] Macwilliam, PA-C   insulin lispro 1-5 Units Subcutaneous TID [facility], CRNP   insulin lispro 1-5 Units Subcutaneous [facility], CRNP   latanoprost 1 drop Both Eyes HS [place] Macleani, PA-C   timolol 1 drop Both Eyes BID Nava Macwilliam, PA-C   verapamil 80 mg Oral Daily Nava Macleani PA-C     Continuous Infusions:   PRN Meds:    clobetasol  Daily PRN   hydrALAZINE 5 mg Q6H PRN       Invasive lines and devices:   Invasive Devices     Peripheral Intravenous Line            Peripheral IV 01/10/19 Left Antecubital 1 day    Peripheral IV 01/10/19 Right Wrist 1 day                   SIGNATURE: Nava Joy PA-C  DATE: January 12, 2019

## 2019-01-12 NOTE — PROGRESS NOTES
Progress Note - Cardiology   [facility]     Dipak Ly [de-identified] y o  male MRN: [id number]  : 1940  Unit/Bed#: ICU 08 Encounter: [id number]    Assessment and Plan:   1  Acute biventricular heart failure:  Etiology unclear  Will continue IV diuresis with Lasix 40 mg once a day with close monitoring of his renal function  If patient tolerates and renal function improves may consider increasing Lasix to b i d     Will discontinue patient's verapamil and transition him to Coreg, with close monitoring of his heart rate and vital signs  ACE/ARB held due to acute kidney injury  Continue to monitor I&O, daily weights and labs  Recommend ischemic workup when patient is stable    2  Moderate pericardial effusion without evidence of tamponade physiology:  Patient started on colchicine 0 3 mg daily (renal dose)  Will recheck limited echo on Monday, if patient worsens may need to consider pericardiocentesis  3  Acute on chronic kidney disease:  BUN and creatinine slowly improving with diuresis  Will continue to monitor  Avoid nephrotoxin medications at this time  4  Diabetes with hemoglobin A1c 7 4:  Managed per the primary team    5  PVCs:  Will add low-dose beta-blocker and continue to monitor  Subjective / Objective:   Patient seen examined  States breathing has improved since admission  Patient has diuresed almost 3 L of fluid since admission and is down approximately 4 lb  Still appears to be volume overloaded  Echo noted with ejection fraction estimated at 40% with diffuse left ventricular hypokinesis, functionality of right ventricle also decreased  Patient also noted to have a moderate free-flowing pericardial effusion without tamponade physiology  Vitals: Blood pressure (!) 180/72, pulse 60, temperature 97 7 °F (36 5 °C), temperature source Temporal, resp  rate 21, height 5' 9" (1 753 m), weight 70 6 kg (155 lb 10 3 oz), SpO2 99 %    Vitals:    19 0500 19 0536   Weight: 72 3 kg (159 lb 6 3 oz) 70 6 kg (155 lb 10 3 oz)     Body mass index is 22 98 kg/m²  BP Readings from Last 3 Encounters:   01/12/19 (!) 180/72   12/19/18 (!) 172/82   05/23/18 152/57     Orthostatic Blood Pressures      Most Recent Value   Blood Pressure   180/72 filed at 01/12/2019 1033   Patient Position - Orthostatic VS  Lying filed at 01/12/2019 0000        I/O       01/10 0701 - 01/11 0700 01/11 0701 - 01/12 0700 01/12 0701 - 01/13 0700    P  O  120 120     I V  (mL/kg) 88 1 (1 2)      IV Piggyback  50     Total Intake(mL/kg) 208 1 (2 9) 170 (2 4)     Urine (mL/kg/hr) 800 1900 (1 1) 200 (0 7)    Total Output 800 1900 200    Net -592 -1730 -200               Invasive Devices     Peripheral Intravenous Line            Peripheral IV 01/10/19 Left Antecubital 1 day    Peripheral IV 01/10/19 Right Wrist 1 day                  Intake/Output Summary (Last 24 hours) at 01/12/19 1050  Last data filed at 01/12/19 0901   Gross per 24 hour   Intake              120 ml   Output             1700 ml   Net            -1580 ml         Physical Exam:   Physical Exam   Constitutional: He is oriented to person, place, and time  He appears well-developed and well-nourished  No distress  HENT:   Head: Normocephalic and atraumatic  Right Ear: External ear normal    Left Ear: External ear normal    Eyes: Pupils are equal, round, and reactive to light  Right eye exhibits no discharge  Left eye exhibits no discharge  No scleral icterus  Neck: Normal range of motion  Neck supple  JVD present  No thyromegaly present  Cardiovascular: Normal rate, intact distal pulses and normal pulses  An irregular rhythm present  Extrasystoles are present  Exam reveals no gallop and no friction rub  No murmur heard  Pulmonary/Chest: Effort normal  No respiratory distress  He has no wheezes  He has rales  Decreased breath sounds at bases up 1/4, fine faint scattered crackles   Abdominal: Soft   Bowel sounds are normal  He exhibits no distension  Musculoskeletal: He exhibits edema  Plus two pedal and ankle edema to mid calf   Neurological: He is alert and oriented to person, place, and time  Skin: Skin is warm and dry  He is not diaphoretic  Psychiatric: He has a normal mood and affect  Nursing note and vitals reviewed              Medications/ Allergies:     Current Facility-Administered Medications:  carvedilol 3 125 mg Oral BID With Meals SUSANA Meek   clobetasol  Topical Daily PRN Devra Goldmann, PA-C   colchicine 0 3 mg Oral Daily Pasha Mantle, CRNP   heparin (porcine) 5,000 Units Subcutaneous [facility] Devra Goldmann, PA-C   hydrALAZINE 5 mg Intravenous Q6H PRN Devra Goldmann, PA-C   insulin lispro 1-5 Units Subcutaneous TID AC Pasha Mantle, CRNP   insulin lispro 1-5 Units Subcutaneous HS Pasha Mantle, CRNP   latanoprost 1 drop Both Eyes HS Devra Goldmann, PA-C   timolol 1 drop Both Eyes BID Devra Goldmann, PA-C       clobetasol  Daily PRN   hydrALAZINE 5 mg Q6H PRN     Allergies   Allergen Reactions    Sulfa Antibiotics     Sulfur        VTE Pharmacologic Prophylaxis:   Sequential compression device (Venodyne)     Labs:   Troponins:  Results from last 7 days  Lab Units 01/11/19  0450 01/10/19  2353 01/10/19  2058   TROPONIN I ng/mL 0 10* 0 12* 0 11*       CBC with diff:  Results from last 7 days  Lab Units 01/11/19  0450 01/10/19  1731   WBC Thousand/uL 5 91 7 24   HEMOGLOBIN g/dL 10 6* 11 6*   HEMATOCRIT % 32 3* 37 0   MCV fL 100* 103*   PLATELETS Thousands/uL 123* 157   MCH pg 32 9 32 4   MCHC g/dL 32 8 31 4   RDW % 14 5 14 2   MPV fL 9 6 9 7   NRBC AUTO /100 WBCs  --  0       CMP:  Results from last 7 days  Lab Units 01/12/19  0535 01/11/19  2222 01/11/19  0450 01/10/19  1731   SODIUM mmol/L 137 137 139 139   POTASSIUM mmol/L 4 3 4 8 3 9 4 8   CHLORIDE mmol/L 102 101 104 102   CO2 mmol/L 26 27 26 26   ANION GAP mmol/L 9 9 9 11   BUN mg/dL 53* 57* 56* 56*   CREATININE mg/dL 2 29* 2 41* 2 30* 2 43* CALCIUM mg/dL 8 5 8 6 8 3 8 6   AST U/L  --   --  17 21   ALT U/L  --   --  24 26   ALK PHOS U/L  --   --  105 125*   TOTAL PROTEIN g/dL  --   --  5 8* 6 4   ALBUMIN g/dL  --   --  3 1* 3 5   TOTAL BILIRUBIN mg/dL  --   --  0 80 0 80   EGFR ml/min/1 73sq m 26 24 26 24     Magnesium:  Results from last 7 days  Lab Units 01/12/19  0535 01/11/19  2222 01/11/19  0450   MAGNESIUM mg/dL 2 3 2 1 1 6     Coags:  Results from last 7 days  Lab Units 01/11/19  0450 01/10/19  1731   PTT seconds  --  31   INR  1 10 1 09     TSH:  Results from last 7 days  Lab Units 01/11/19  1001   TSH 3RD GENERATON uIU/mL 1 816     Hgb A1c:  Results from last 7 days  Lab Units 01/10/19  2058   HEMOGLOBIN A1C % 7 4*     NT-proBNP:   Recent Labs      01/10/19   1731   NTBNP  26,999*        Imaging & Testing   I have personally reviewed pertinent reports  Xr Chest 1 View Portable    Result Date: 1/11/2019  Narrative: CHEST INDICATION:   SOB  COMPARISON:  X-ray 8/11/08 EXAM PERFORMED/VIEWS:  XR CHEST PORTABLE FINDINGS:  Tubing projects over the right neck base and mediastinum, is external  Enlarged cardiomediastinal silhouette  The lungs are clear  No pneumothorax or pleural effusion  Osseous structures appear within normal limits for patient age  Impression: Enlarged cardiomediastinal silhouette  Pericardial effusion versus cardiomegaly  The study was marked in [facility] for immediate notification  Workstation performed: [id number]        EKG / Monitor: Personally reviewed      Sinus rhythm with frequent PVCs and short bouts of PAT    Cardiac testing:   Results for orders placed during the hospital encounter of 01/10/19   Echo complete with contrast if indicated    Narrative [address]  [address] [facility]  [place] [address] [phone]    Transthoracic Echocardiogram  2D, M-mode, Doppler, and Color Doppler    Study date:  11-Jan-2019    Patient: Yamil Franklin  MR number: [id number]  Account number: [id number]  : 1940  Age: [de-identified] years  Gender: Male  Status: Inpatient  Location: Bedside  Height: 69 in 69 in  Weight: 159 lb 158 6 lb  BP: 127/ 61 mmHg    Indications: Heart Failure, Shortness of Breath x1 week as per patient  Diagnoses: I50 9 - Heart failure, unspecified    Sonographer:  Maksim Rivera, RCS, RVS  Primary Physician:  Arlyn Daniels DO  Referring Physician:  Nava Joy PA-C  Group:  [facility]  Interpreting Physician:  Elaine Tom MD    SUMMARY    LEFT VENTRICLE:  Systolic function was mildly to moderately reduced  Ejection fraction was estimated in the range of 40 % to 45 % to be 40 %  There was moderate diffuse hypokinesis  Wall thickness was moderately increased  Features were consistent with a pseudonormal left ventricular filling pattern, with concomitant abnormal relaxation and increased filling pressure (grade 2 diastolic dysfunction)  RIGHT VENTRICLE:  Systolic function was mildly reduced by TAPSE-1 4cm    LEFT ATRIUM:  The atrium was mildly dilated  MITRAL VALVE:  There was trace regurgitation  IVC, HEPATIC VEINS:  The inferior vena cava was mildly dilated  PERICARDIUM:  A moderate, free-flowing pericardial effusion was identified  There was no evidence of hemodynamic compromise  HISTORY: PRIOR HISTORY: CHF, Diabetes, HTN, PVCs, Former Smoker    PROCEDURE: The procedure was performed at the bedside  This was a routine study  The transthoracic approach was used  The study included complete 2D imaging, M-mode, complete spectral Doppler, and color Doppler  The heart rate was 72 bpm,  at the start of the study  Images were obtained from the parasternal, apical, and subcostal acoustic windows  Images were not obtained from the suprasternal notch acoustic windows  Image quality was adequate  LEFT VENTRICLE: Size was normal  Systolic function was mildly to moderately reduced   Ejection fraction was estimated in the range of 40 % to 45 % to be 40 %  There was moderate diffuse hypokinesis  Wall thickness was moderately increased  No evidence of apical thrombus  DOPPLER: Features were consistent with a pseudonormal left ventricular filling pattern, with concomitant abnormal relaxation and increased filling pressure (grade 2 diastolic dysfunction)  RIGHT VENTRICLE: The size was normal  Systolic function was mildly reduced by TAPSE-1 4cm Wall thickness was normal     LEFT ATRIUM: The atrium was mildly dilated  RIGHT ATRIUM: Size was normal     MITRAL VALVE: Valve structure was normal  There was normal leaflet separation  DOPPLER: The transmitral velocity was within the normal range  There was no evidence for stenosis  There was trace regurgitation  AORTIC VALVE: The valve was trileaflet  Leaflets exhibited mildly increased thickness and normal cuspal separation  DOPPLER: Transaortic velocity was within the normal range  There was no evidence for stenosis  There was no significant  regurgitation  TRICUSPID VALVE: The valve structure was normal  There was normal leaflet separation  DOPPLER: The transtricuspid velocity was within the normal range  There was no evidence for stenosis  There was no significant regurgitation  PULMONIC VALVE: Leaflets exhibited normal thickness, no calcification, and normal cuspal separation  DOPPLER: The transpulmonic velocity was within the normal range  There was no significant regurgitation  PERICARDIUM: A moderate, free-flowing pericardial effusion was identified  There was no evidence of hemodynamic compromise  AORTA: The root exhibited normal size  SYSTEMIC VEINS: IVC: The inferior vena cava was mildly dilated      SYSTEM MEASUREMENT TABLES    2D mode  AoR Diam 2D: 3 2 cm  LA Diam (2D): 4 3 cm  LA/Ao (2D): 1 34  FS (2D Teich): 22 9 %  IVSd (2D): 1 41 cm  LVDEV: 137 cm³  LVEDV MOD BP: 178 cm³  LVESV: 74 2 cm³  LVIDd(2D): 5 32 cm  LVISd (2D): 4 1 cm  LVPWd (2D): 1 42 cm  SV (Teich): 62 8 cm³    Apical four chamber  LVEF A4C: 40 %    Apical two chamber  LVEF A2C: 45 %    Unspecified Scan Mode  MV Peak A Mitchell: 883 mm/s  MV Peak E Mitchell  Mean: 1080 mm/s  Max P mm[Hg]  V Max: 2990 mm/s  Vmax: 2920 mm/s  RA Area: 11 8 cm squared  RA Volume: 25 6 cm³  TAPSE: 1 4 cm    [facility]    Prepared and electronically signed by    Nahum Saha MD  Signed 2019 18:14:27       SUSANA Cuevas        "This note has been constructed using a voice recognition system  Therefore there may be syntax, spelling, and/or grammatical errors   Please call if you have any questions  "

## 2019-01-13 PROBLEM — I31.39 PERICARDIAL EFFUSION: Status: ACTIVE | Noted: 2019-01-13

## 2019-01-13 PROBLEM — I31.3 PERICARDIAL EFFUSION: Status: ACTIVE | Noted: 2019-01-13

## 2019-01-13 PROBLEM — I50.41 ACUTE COMBINED SYSTOLIC AND DIASTOLIC CONGESTIVE HEART FAILURE (HCC): Status: ACTIVE | Noted: 2019-01-10

## 2019-01-13 LAB
ANION GAP SERPL CALCULATED.3IONS-SCNC: 7 MMOL/L (ref 4–13)
ATRIAL RATE: 87 BPM
BUN SERPL-MCNC: 57 MG/DL (ref 5–25)
CALCIUM SERPL-MCNC: 8.3 MG/DL (ref 8.3–10.1)
CHLORIDE SERPL-SCNC: 95 MMOL/L (ref 100–108)
CO2 SERPL-SCNC: 28 MMOL/L (ref 21–32)
CREAT SERPL-MCNC: 2.36 MG/DL (ref 0.6–1.3)
GFR SERPL CREATININE-BSD FRML MDRD: 25 ML/MIN/1.73SQ M
GLUCOSE SERPL-MCNC: 137 MG/DL (ref 65–140)
GLUCOSE SERPL-MCNC: 152 MG/DL (ref 65–140)
GLUCOSE SERPL-MCNC: 161 MG/DL (ref 65–140)
GLUCOSE SERPL-MCNC: 346 MG/DL (ref 65–140)
GLUCOSE SERPL-MCNC: 357 MG/DL (ref 65–140)
GLUCOSE SERPL-MCNC: 371 MG/DL (ref 65–140)
P AXIS: 30 DEGREES
POTASSIUM SERPL-SCNC: 4.8 MMOL/L (ref 3.5–5.3)
PR INTERVAL: 118 MS
QRS AXIS: -17 DEGREES
QRSD INTERVAL: 90 MS
QT INTERVAL: 354 MS
QTC INTERVAL: 425 MS
SODIUM SERPL-SCNC: 130 MMOL/L (ref 136–145)
T WAVE AXIS: 61 DEGREES
VENTRICULAR RATE: 87 BPM

## 2019-01-13 PROCEDURE — 99232 SBSQ HOSP IP/OBS MODERATE 35: CPT | Performed by: FAMILY MEDICINE

## 2019-01-13 PROCEDURE — 93010 ELECTROCARDIOGRAM REPORT: CPT | Performed by: INTERNAL MEDICINE

## 2019-01-13 PROCEDURE — 99232 SBSQ HOSP IP/OBS MODERATE 35: CPT | Performed by: INTERNAL MEDICINE

## 2019-01-13 PROCEDURE — 82948 REAGENT STRIP/BLOOD GLUCOSE: CPT

## 2019-01-13 PROCEDURE — 80048 BASIC METABOLIC PNL TOTAL CA: CPT | Performed by: FAMILY MEDICINE

## 2019-01-13 PROCEDURE — G0009 ADMIN PNEUMOCOCCAL VACCINE: HCPCS | Performed by: INTERNAL MEDICINE

## 2019-01-13 PROCEDURE — 90670 PCV13 VACCINE IM: CPT | Performed by: INTERNAL MEDICINE

## 2019-01-13 RX ORDER — ASPIRIN 81 MG/1
81 TABLET ORAL DAILY
Status: DISCONTINUED | OUTPATIENT
Start: 2019-01-13 | End: 2019-01-14

## 2019-01-13 RX ORDER — ISOSORBIDE DINITRATE 40 MG
40 TABLET, EXTENDED RELEASE ORAL DAILY
Status: DISCONTINUED | OUTPATIENT
Start: 2019-01-13 | End: 2019-01-13

## 2019-01-13 RX ORDER — DOCUSATE SODIUM 100 MG/1
100 CAPSULE, LIQUID FILLED ORAL 2 TIMES DAILY
Status: DISCONTINUED | OUTPATIENT
Start: 2019-01-13 | End: 2019-01-16 | Stop reason: HOSPADM

## 2019-01-13 RX ORDER — ISOSORBIDE MONONITRATE 30 MG/1
30 TABLET, EXTENDED RELEASE ORAL DAILY
Status: DISCONTINUED | OUTPATIENT
Start: 2019-01-13 | End: 2019-01-16 | Stop reason: HOSPADM

## 2019-01-13 RX ORDER — POLYETHYLENE GLYCOL 3350 17 G/17G
17 POWDER, FOR SOLUTION ORAL DAILY PRN
Status: DISCONTINUED | OUTPATIENT
Start: 2019-01-13 | End: 2019-01-16 | Stop reason: HOSPADM

## 2019-01-13 RX ORDER — ATORVASTATIN CALCIUM 40 MG/1
40 TABLET, FILM COATED ORAL
Status: DISCONTINUED | OUTPATIENT
Start: 2019-01-13 | End: 2019-01-16 | Stop reason: HOSPADM

## 2019-01-13 RX ORDER — FUROSEMIDE 10 MG/ML
40 INJECTION INTRAMUSCULAR; INTRAVENOUS
Status: DISCONTINUED | OUTPATIENT
Start: 2019-01-13 | End: 2019-01-13

## 2019-01-13 RX ORDER — FUROSEMIDE 10 MG/ML
40 INJECTION INTRAMUSCULAR; INTRAVENOUS
Status: DISCONTINUED | OUTPATIENT
Start: 2019-01-13 | End: 2019-01-14

## 2019-01-13 RX ADMIN — ATORVASTATIN CALCIUM 40 MG: 40 TABLET, FILM COATED ORAL at 15:34

## 2019-01-13 RX ADMIN — ASPIRIN 81 MG: 81 TABLET, COATED ORAL at 10:35

## 2019-01-13 RX ADMIN — CARVEDILOL 3.12 MG: 3.12 TABLET, FILM COATED ORAL at 08:16

## 2019-01-13 RX ADMIN — HEPARIN SODIUM 5000 UNITS: 5000 INJECTION, SOLUTION INTRAVENOUS; SUBCUTANEOUS at 05:51

## 2019-01-13 RX ADMIN — FUROSEMIDE 40 MG: 10 INJECTION, SOLUTION INTRAMUSCULAR; INTRAVENOUS at 15:26

## 2019-01-13 RX ADMIN — TIMOLOL MALEATE 1 DROP: 5 SOLUTION/ DROPS OPHTHALMIC at 08:18

## 2019-01-13 RX ADMIN — HEPARIN SODIUM 5000 UNITS: 5000 INJECTION, SOLUTION INTRAVENOUS; SUBCUTANEOUS at 21:15

## 2019-01-13 RX ADMIN — INSULIN LISPRO 1 UNITS: 100 INJECTION, SOLUTION INTRAVENOUS; SUBCUTANEOUS at 21:15

## 2019-01-13 RX ADMIN — TIMOLOL MALEATE 1 DROP: 5 SOLUTION/ DROPS OPHTHALMIC at 17:09

## 2019-01-13 RX ADMIN — POLYETHYLENE GLYCOL 3350 17 G: 17 POWDER, FOR SOLUTION ORAL at 13:09

## 2019-01-13 RX ADMIN — PNEUMOCOCCAL 13-VALENT CONJUGATE VACCINE 0.5 ML: 2.2; 2.2; 2.2; 2.2; 2.2; 4.4; 2.2; 2.2; 2.2; 2.2; 2.2; 2.2; 2.2 INJECTION, SUSPENSION INTRAMUSCULAR at 10:33

## 2019-01-13 RX ADMIN — LATANOPROST 1 DROP: 50 SOLUTION/ DROPS OPHTHALMIC at 21:15

## 2019-01-13 RX ADMIN — INSULIN LISPRO 4 UNITS: 100 INJECTION, SOLUTION INTRAVENOUS; SUBCUTANEOUS at 11:39

## 2019-01-13 RX ADMIN — INSULIN LISPRO 1 UNITS: 100 INJECTION, SOLUTION INTRAVENOUS; SUBCUTANEOUS at 16:25

## 2019-01-13 RX ADMIN — COLCHICINE 0.3 MG: 0.6 TABLET, FILM COATED ORAL at 08:16

## 2019-01-13 RX ADMIN — ISOSORBIDE MONONITRATE 30 MG: 30 TABLET, EXTENDED RELEASE ORAL at 10:35

## 2019-01-13 RX ADMIN — DOCUSATE SODIUM 100 MG: 100 CAPSULE, LIQUID FILLED ORAL at 13:10

## 2019-01-13 RX ADMIN — CARVEDILOL 3.12 MG: 3.12 TABLET, FILM COATED ORAL at 15:34

## 2019-01-13 RX ADMIN — HEPARIN SODIUM 5000 UNITS: 5000 INJECTION, SOLUTION INTRAVENOUS; SUBCUTANEOUS at 13:10

## 2019-01-13 RX ADMIN — DOCUSATE SODIUM 100 MG: 100 CAPSULE, LIQUID FILLED ORAL at 17:10

## 2019-01-13 RX ADMIN — FUROSEMIDE 40 MG: 10 INJECTION, SOLUTION INTRAMUSCULAR; INTRAVENOUS at 08:18

## 2019-01-13 NOTE — PLAN OF CARE
CARDIOVASCULAR - ADULT     Maintains optimal cardiac output and hemodynamic stability Progressing     Absence of cardiac dysrhythmias or at baseline rhythm Progressing        Knowledge Deficit     Patient/family/caregiver demonstrates understanding of disease process, treatment plan, medications, and discharge instructions Progressing        METABOLIC, FLUID AND ELECTROLYTES - ADULT     Electrolytes maintained within normal limits Progressing     Fluid balance maintained Progressing     Glucose maintained within target range Progressing        Nutrition/Hydration-ADULT     Nutrient/Hydration intake appropriate for improving, restoring or maintaining nutritional needs Progressing        Potential for Falls     Patient will remain free of falls Progressing        RESPIRATORY - ADULT     Achieves optimal ventilation and oxygenation Progressing        SAFETY ADULT     Patient will remain free of falls Progressing     Maintain or return to baseline ADL function Progressing     Maintain or return mobility status to optimal level Progressing

## 2019-01-13 NOTE — ASSESSMENT & PLAN NOTE
Possibly NSTEMI type 2   Stress test was abnormal with fixed inferolateral wall defect but no clear ischemia  EF was noted to be 37%  Moderate-sized infarct noted  Continue aspirin, Coreg, Imdur, statin    Patient will follow up with Cardiology after discharge

## 2019-01-13 NOTE — ASSESSMENT & PLAN NOTE
Lab Results   Component Value Date    HGBA1C 7 4 (H) 01/10/2019       Recent Labs      01/15/19   1606  01/15/19   2148  01/16/19   0724  01/16/19   1132   POCGLU  267*  203*  191*  263*     Continue metformin as he uses at home

## 2019-01-13 NOTE — ASSESSMENT & PLAN NOTE
Likely due to CHF exacerbation  Required BiPAP support initially and was in the ICU initially  Now off supplemental oxygen by nasal cannula and stable on room air

## 2019-01-13 NOTE — PROGRESS NOTES
Progress Note - Cardiology   [facility]     Luis Yee [de-identified] y o  male MRN: [id number]  : 1940  Unit/Bed#: [address] [place] [address] Encounter: [id number]    Assessment and Plan:   1  Acute biventricular heart failure:  Patient's renal function improving with diuresis  Will increase his Lasix to 40 mg IV b i d  And continue to monitor  Patient tolerating beta-blocker  Blood pressure still elevated  Will load low-dose Imdur or and continue to monitor  Goal will also be to include hydralazine  Continue to monitor I&O, daily weights and electrolytes  CHF education  2  Moderate pericardial effusion without evidence of tamponade:  Patient tolerating colchicine without complaints of diarrhea  For limited echo in the a m  3  Acute on chronic kidney disease:  Renal function improving with diuresis  4  PVCs:  Continue but seem to decreased with addition of beta-blocker    5  Diabetes hemoglobin A1c 7 4:  Managed by the primary team    6  NSTEMI with peak troponin 0 12:  Patient started on aspirin, beta-blocker and Lipitor  Will need ischemic workup once stable  Subjective / Objective:   Patient seen examined  He feels that he is slowly improving since admission to the hospital   Still remains moderately volume overloaded  Patient for repeat limited echo to evaluate pericardial effusion in the a m       Vitals: Blood pressure 170/80, pulse 68, temperature 97 7 °F (36 5 °C), temperature source Oral, resp  rate 20, height 5' 9" (1 753 m), weight 75 3 kg (166 lb 0 1 oz), SpO2 100 %  Vitals:    19 0346 19 0550   Weight: 70 6 kg (155 lb 10 3 oz) 75 3 kg (166 lb 0 1 oz)     Body mass index is 24 51 kg/m²    BP Readings from Last 3 Encounters:   19 170/80   18 (!) 172/82   18 152/57     Orthostatic Blood Pressures      Most Recent Value   Blood Pressure  170/80 filed at 2019 0730   Patient Position - Orthostatic VS  Lying filed at 2019 0112 I/O       01/11 0701 - 01/12 0700 01/12 0701 - 01/13 0700 01/13 0701 - 01/14 0700    P  O  120 720     I V  (mL/kg)  10 (0 1)     IV Piggyback 50      Total Intake(mL/kg) 170 (2 4) 730 (9 7)     Urine (mL/kg/hr) 1900 (1 1) 1550 (0 9)     Total Output 1900 1550      Net -1730 -820                 Invasive Devices     Peripheral Intravenous Line            Peripheral IV 01/10/19 Left Antecubital 2 days    Peripheral IV 01/10/19 Right Wrist 2 days                  Intake/Output Summary (Last 24 hours) at 01/13/19 0934  Last data filed at 01/13/19 0501   Gross per 24 hour   Intake              730 ml   Output             1350 ml   Net             -620 ml         Physical Exam:   Physical Exam   Constitutional: He is oriented to person, place, and time  He appears well-developed and well-nourished  No distress  HENT:   Head: Normocephalic and atraumatic  Right Ear: External ear normal    Left Ear: External ear normal    Eyes: Pupils are equal, round, and reactive to light  Conjunctivae are normal  Right eye exhibits no discharge  Left eye exhibits no discharge  No scleral icterus  Neck: Normal range of motion  Neck supple  No JVD present  No thyromegaly present  Cardiovascular: Normal rate, intact distal pulses and normal pulses  An irregular rhythm present  Exam reveals no gallop and no friction rub  No murmur heard  Pulmonary/Chest: Effort normal  He has no rales  Fine bibasilar crackles   Abdominal: Soft  Bowel sounds are normal    Neurological: He is alert and oriented to person, place, and time  Skin: Skin is warm and dry  He is not diaphoretic  Psychiatric: He has a normal mood and affect  Nursing note and vitals reviewed              Medications/ Allergies:     Current Facility-Administered Medications:  carvedilol 3 125 mg Oral BID With Meals Sunday SUSANA Lemon   clobetasol  Topical Daily PRN Ryan Benitez PA-C   colchicine 0 3 mg Oral Daily SUSANA Patino   furosemide 40 mg Intravenous BID (diuretic) Olita SUSANA Hloder   heparin (porcine) 5,000 Units Subcutaneous [facility] Bishnu Key PA-C   hydrALAZINE 5 mg Intravenous Q6H PRN Bishnu Key PA-C   insulin lispro 1-5 Units Subcutaneous TID AC Randy Fam, CRNP   insulin lispro 1-5 Units Subcutaneous HS Randy Fam, CRNP   isosorbide dinitrate 40 mg Oral Daily SUSANA Walden   latanoprost 1 drop Both Eyes HS Bishnu Key PA-C   pneumococcal 13-valent conjugate vaccine 0 5 mL Intramuscular Prior to discharge Morgan Vasquez MD   timolol 1 drop Both Eyes BID Bishnu Key PA-C       clobetasol  Daily PRN   hydrALAZINE 5 mg Q6H PRN   pneumococcal 13-valent conjugate vaccine 0 5 mL Prior to discharge     Allergies   Allergen Reactions    Sulfa Antibiotics     Sulfur        VTE Pharmacologic Prophylaxis:   Sequential compression device (Venodyne)     Labs:   Troponins:  Results from last 7 days  Lab Units 01/11/19  0450 01/10/19  2353 01/10/19  2058   TROPONIN I ng/mL 0 10* 0 12* 0 11*       CBC with diff:  Results from last 7 days  Lab Units 01/11/19  0450 01/10/19  1731   WBC Thousand/uL 5 91 7 24   HEMOGLOBIN g/dL 10 6* 11 6*   HEMATOCRIT % 32 3* 37 0   MCV fL 100* 103*   PLATELETS Thousands/uL 123* 157   MCH pg 32 9 32 4   MCHC g/dL 32 8 31 4   RDW % 14 5 14 2   MPV fL 9 6 9 7   NRBC AUTO /100 WBCs  --  0       CMP:  Results from last 7 days  Lab Units 01/12/19  0535 01/11/19  2222 01/11/19  0450 01/10/19  1731   SODIUM mmol/L 137 137 139 139   POTASSIUM mmol/L 4 3 4 8 3 9 4 8   CHLORIDE mmol/L 102 101 104 102   CO2 mmol/L 26 27 26 26   ANION GAP mmol/L 9 9 9 11   BUN mg/dL 53* 57* 56* 56*   CREATININE mg/dL 2 29* 2 41* 2 30* 2 43*   CALCIUM mg/dL 8 5 8 6 8 3 8 6   AST U/L  --   --  17 21   ALT U/L  --   --  24 26   ALK PHOS U/L  --   --  105 125*   TOTAL PROTEIN g/dL  --   --  5 8* 6 4   ALBUMIN g/dL  --   --  3 1* 3 5   TOTAL BILIRUBIN mg/dL  --   --  0 80 0 80   EGFR ml/min/1 73sq m 26 24 26 24 Magnesium:  Results from last 7 days  Lab Units 19  0535 19  2222 19  0450   MAGNESIUM mg/dL 2 3 2 1 1 6     Coags:  Results from last 7 days  Lab Units 19  0450 01/10/19  1731   PTT seconds  --  31   INR  1 10 1 09     TSH:  Results from last 7 days  Lab Units 19  1001   TSH 3RD GENERATON uIU/mL 1 816     Hgb A1c:  Results from last 7 days  Lab Units 01/10/19  2058   HEMOGLOBIN A1C % 7 4*     NT-proBNP:   Recent Labs      01/10/19   1731   NTBNP  26,999*        Imaging & Testing   I have personally reviewed pertinent reports  Xr Chest 1 View Portable    Result Date: 2019  Narrative: CHEST INDICATION:   SOB  COMPARISON:  X-ray 08 EXAM PERFORMED/VIEWS:  XR CHEST PORTABLE FINDINGS:  Tubing projects over the right neck base and mediastinum, is external  Enlarged cardiomediastinal silhouette  The lungs are clear  No pneumothorax or pleural effusion  Osseous structures appear within normal limits for patient age  Impression: Enlarged cardiomediastinal silhouette  Pericardial effusion versus cardiomegaly  The study was marked in [facility] for immediate notification  Workstation performed: [id number]        EKG / Monitor: Personally reviewed  Sinus rhythm with frequent PACs and PVCs unifocal    Cardiac testing:   Results for orders placed during the hospital encounter of 01/10/19   Echo complete with contrast if indicated    Narrative [address]  [address] [facility]  [phone]    Transthoracic Echocardiogram  2D, M-mode, Doppler, and Color Doppler    Study date:  2019    Patient: Mariana Smallwood  MR number: [id number]  Account number: [de-identified]  : 1940  Age: [de-identified] years  Gender: Male  Status: Inpatient  Location: Bedside  Height: 69 in 69 in  Weight: 159 lb 158 6 lb  BP: 127/ 61 mmHg    Indications: Heart Failure, Shortness of Breath x1 week as per patient      Diagnoses: I50 9 - Heart failure, unspecified    Sonographer:  Clare Ornelas, RCS, RVS  Primary Physician:  Addison Swenson DO  Referring Physician:  Yohan Abbasi PA-C  Group:  [facility]  Interpreting Physician:  Lizzy Ramirez MD    SUMMARY    LEFT VENTRICLE:  Systolic function was mildly to moderately reduced  Ejection fraction was estimated in the range of 40 % to 45 % to be 40 %  There was moderate diffuse hypokinesis  Wall thickness was moderately increased  Features were consistent with a pseudonormal left ventricular filling pattern, with concomitant abnormal relaxation and increased filling pressure (grade 2 diastolic dysfunction)  RIGHT VENTRICLE:  Systolic function was mildly reduced by TAPSE-1 4cm    LEFT ATRIUM:  The atrium was mildly dilated  MITRAL VALVE:  There was trace regurgitation  IVC, HEPATIC VEINS:  The inferior vena cava was mildly dilated  PERICARDIUM:  A moderate, free-flowing pericardial effusion was identified  There was no evidence of hemodynamic compromise  HISTORY: PRIOR HISTORY: CHF, Diabetes, HTN, PVCs, Former Smoker    PROCEDURE: The procedure was performed at the bedside  This was a routine study  The transthoracic approach was used  The study included complete 2D imaging, M-mode, complete spectral Doppler, and color Doppler  The heart rate was 72 bpm,  at the start of the study  Images were obtained from the parasternal, apical, and subcostal acoustic windows  Images were not obtained from the suprasternal notch acoustic windows  Image quality was adequate  LEFT VENTRICLE: Size was normal  Systolic function was mildly to moderately reduced  Ejection fraction was estimated in the range of 40 % to 45 % to be 40 %  There was moderate diffuse hypokinesis  Wall thickness was moderately increased  No evidence of apical thrombus   DOPPLER: Features were consistent with a pseudonormal left ventricular filling pattern, with concomitant abnormal relaxation and increased filling pressure (grade 2 diastolic dysfunction)  RIGHT VENTRICLE: The size was normal  Systolic function was mildly reduced by TAPSE-1 4cm Wall thickness was normal     LEFT ATRIUM: The atrium was mildly dilated  RIGHT ATRIUM: Size was normal     MITRAL VALVE: Valve structure was normal  There was normal leaflet separation  DOPPLER: The transmitral velocity was within the normal range  There was no evidence for stenosis  There was trace regurgitation  AORTIC VALVE: The valve was trileaflet  Leaflets exhibited mildly increased thickness and normal cuspal separation  DOPPLER: Transaortic velocity was within the normal range  There was no evidence for stenosis  There was no significant  regurgitation  TRICUSPID VALVE: The valve structure was normal  There was normal leaflet separation  DOPPLER: The transtricuspid velocity was within the normal range  There was no evidence for stenosis  There was no significant regurgitation  PULMONIC VALVE: Leaflets exhibited normal thickness, no calcification, and normal cuspal separation  DOPPLER: The transpulmonic velocity was within the normal range  There was no significant regurgitation  PERICARDIUM: A moderate, free-flowing pericardial effusion was identified  There was no evidence of hemodynamic compromise  AORTA: The root exhibited normal size  SYSTEMIC VEINS: IVC: The inferior vena cava was mildly dilated  SYSTEM MEASUREMENT TABLES    2D mode  AoR Diam 2D: 3 2 cm  LA Diam (2D): 4 3 cm  LA/Ao (2D): 1 34  FS (2D Teich): 22 9 %  IVSd (2D): 1 41 cm  LVDEV: 137 cm³  LVEDV MOD BP: 178 cm³  LVESV: 74 2 cm³  LVIDd(2D): 5 32 cm  LVISd (2D): 4 1 cm  LVPWd (2D): 1 42 cm  SV (Teich): 62 8 cm³    Apical four chamber  LVEF A4C: 40 %    Apical two chamber  LVEF A2C: 45 %    Unspecified Scan Mode  MV Peak A Mitchell: 883 mm/s  MV Peak E Mitchell   Mean: 1080 mm/s  Max P mm[Hg]  V Max: 2990 mm/s  Vmax: 2920 mm/s  RA Area: 11 8 cm squared  RA Volume: 25 6 cm³  TAPSE: 1 4     [facility]    Prepared and electronically signed by    Shahla Wood MD  Signed 11-Jan-2019 18:14:27           SUSANA Ga      "This note has been constructed using a voice recognition system  Therefore there may be syntax, spelling, and/or grammatical errors   Please call if you have any questions  "

## 2019-01-13 NOTE — ASSESSMENT & PLAN NOTE
He was on IV Lasix here with good diuresis  p o   Lasix on discharge  Continue Coreg  Echo as noted below  Patient advised to check daily weights at home and advised to call Cardiology if patient gains 2 lb in 1 day or if having worsening leg edema or getting short of breath

## 2019-01-13 NOTE — PROGRESS NOTES
[facility] Internal Medicine Progress Note  Patient: Lyric Olvrea [de-identified] y o  male   MRN: [id number]  PCP: Sunni Brown DO  Unit/Bed#: [address] [place] [address] Encounter: [id number]  Date Of Visit: 01/13/19    Problem List:    Principal Problem:    Acute respiratory failure with hypoxia ([facility] )  Active Problems:    Acute combined systolic and diastolic congestive heart failure (HCC)    NSTEMI (non-ST elevated myocardial infarction) ([facility] )    Pericardial effusion    Essential hypertension    KARINA (acute kidney injury) ([facility] )    Controlled type 2 diabetes mellitus, without long-term current use of insulin ([facility] )      Assessment & Plan:    * Acute respiratory failure with hypoxia ([facility] )   Assessment & Plan    Likely due to CHF exacerbation  Required BiPAP support initially and was in the ICU initially  Continue supplemental oxygen by nasal cannula and wean off as tolerated     Acute combined systolic and diastolic congestive heart failure ([facility] )   Assessment & Plan    Appreciate Cardiology input  Continue 40 mg of IV Lasix b i d  Continue Coreg     Pericardial effusion   Assessment & Plan    With no evidence of tamponade  Echo showed EF of 40% with grade 2 diastolic dysfunction    Moderate pericardial effusion was noted  Continue Colcrys       NSTEMI (non-ST elevated myocardial infarction) [facility])   Assessment & Plan    Likely NSTEMI type 2 from CHF exacerbation  Continue aspirin, Coreg, Imdur, statin  Plan is for stress test tomorrow for further evaluation     Controlled type 2 diabetes mellitus, without long-term current use of insulin [facility])   Assessment & Plan    Lab Results   Component Value Date    HGBA1C 7 4 (H) 01/10/2019       Recent Labs      01/13/19   0725  01/13/19   1134  01/13/19   1137  01/13/19   1606   POCGLU  137  346*  357*  161*     Holding metformin at this time  Continue sliding scale insulin     KARINA (acute kidney injury) ([facility] )   Assessment & Plan    Patient's creatinine has trended down compared to day of admission  Unclear if elevated creatinine is acute versus chronic in nature as there is no baseline available for comparison  Possible cardiorenal syndrome, metformin and ARB use     Essential hypertension   Assessment & Plan    Blood pressure was initially elevated in the ER  BPs have now improved  Continue Coreg, Imdur           VTE Pharmacologic Prophylaxis:   Pharmacologic: Heparin  Mechanical VTE Prophylaxis in Place: Yes    Patient Centered Rounds: I have performed bedside rounds with nursing staff today  Discussions with Specialists or Other Care Team Provider: Yes    Education and Discussions with Family / Patient:Yes    Time Spent for Care: 30 minutes  More than 50% of total time spent on counseling and coordination of care as described above  Current Length of Stay: 3 day(s)    Current Patient Status: Inpatient     Discharge Plan: home    Code Status: Level 1 - Full Code    Certification Statement: The patient will continue to require additional inpatient hospital stay due to CHF exacerbation      Subjective:   Patient states that breathing is slowly improving    Objective:     Vitals:   Temp (24hrs), Av 8 °F (36 6 °C), Min:97 6 °F (36 4 °C), Max:97 9 °F (36 6 °C)    Temp:  [97 6 °F (36 4 °C)-97 9 °F (36 6 °C)] 97 6 °F (36 4 °C)  HR:  [61-68] 62  Resp:  [18-20] 20  BP: (125-170)/(59-80) 151/63  SpO2:  [100 %] 100 %  Body mass index is 24 51 kg/m²  Input and Output Summary (last 24 hours): Intake/Output Summary (Last 24 hours) at 19  Last data filed at 19 1811   Gross per 24 hour   Intake              490 ml   Output             2000 ml   Net            -1510 ml       Physical Exam:     Physical Exam   Constitutional: He is oriented to person, place, and time  He appears well-developed and well-nourished  No distress  HENT:   Head: Normocephalic and atraumatic  Mouth/Throat: Oropharynx is clear and moist    Eyes: EOM are normal  Right eye exhibits no discharge  Left eye exhibits no discharge  No scleral icterus  Neck: Neck supple  Cardiovascular: Normal rate and regular rhythm  Murmur heard  Pulmonary/Chest: Effort normal  No respiratory distress  He has no wheezes  He has rales  Abdominal: Soft  Bowel sounds are normal  He exhibits no distension  There is no tenderness  Musculoskeletal: He exhibits edema  Neurological: He is alert and oriented to person, place, and time  No cranial nerve deficit  Skin: Skin is dry  He is not diaphoretic  Psychiatric: He has a normal mood and affect  Additional Data:     Labs:      Results from last 7 days  Lab Units 01/11/19  0450   WBC Thousand/uL 5 91   HEMOGLOBIN g/dL 10 6*   HEMATOCRIT % 32 3*   PLATELETS Thousands/uL 123*   NEUTROS PCT % 65   LYMPHS PCT % 19   MONOS PCT % 15*   EOS PCT % 1       Results from last 7 days  Lab Units 01/13/19  1042  01/11/19  0450   POTASSIUM mmol/L 4 8  < > 3 9   CHLORIDE mmol/L 95*  < > 104   CO2 mmol/L 28  < > 26   BUN mg/dL 57*  < > 56*   CREATININE mg/dL 2 36*  < > 2 30*   CALCIUM mg/dL 8 3  < > 8 3   ALK PHOS U/L  --   --  105   ALT U/L  --   --  24   AST U/L  --   --  17   < > = values in this interval not displayed  Results from last 7 days  Lab Units 01/11/19  0450   INR  1 10       * I Have Reviewed All Lab Data Listed Above  * Additional Pertinent Lab Tests Reviewed: All Labs For Current Hospital Admission Reviewed    Imaging:  Xr Chest 1 View Portable    Result Date: 1/11/2019  Narrative: CHEST INDICATION:   SOB  COMPARISON:  X-ray 8/11/08 EXAM PERFORMED/VIEWS:  XR CHEST PORTABLE FINDINGS:  Tubing projects over the right neck base and mediastinum, is external  Enlarged cardiomediastinal silhouette  The lungs are clear  No pneumothorax or pleural effusion  Osseous structures appear within normal limits for patient age  Impression: Enlarged cardiomediastinal silhouette  Pericardial effusion versus cardiomegaly   The study was marked in [facility] for immediate notification  Workstation performed: [id number]     Imaging Reports Reviewed by myself    Cultures:   Blood Culture: No results found for: BLOODCX  Urine Culture: No results found for: URINECX  Sputum Culture: No components found for: SPUTUMCX  Wound Culture: No results found for: WOUNDCULT    Last 24 Hours Medication List:     Current Facility-Administered Medications:  aspirin 81 mg Oral Daily Lavwesley Rose, CARROLNP   atorvastatin 40 mg Oral Daily With [address], CRNP   carvedilol 3 125 mg Oral BID With Meals SUSANA Sears   clobetasol  Topical Daily PRN Shantanu Chamberlain PA-C   colchicine 0 3 mg Oral Daily Queta Shanks, CRNP   docusate sodium 100 mg Oral BID Phoebe Perez DO   furosemide 40 mg Intravenous BID (diuretic) SUSANA Sears   heparin (porcine) 5,000 Units Subcutaneous [facility] Shantanu Chamberlain PA-C   insulin lispro 1-5 Units Subcutaneous TID AC Queta Beaus, CRNP   insulin lispro 1-5 Units Subcutaneous HS Queta Glass, CRTITO   isosorbide mononitrate 30 mg Oral Daily Jc Rose, SUSANA   latanoprost 1 drop Both Eyes HS Shantanu Chamberlain PA-C   polyethylene glycol 17 g Oral Daily PRN Phoebe Perez,    timolol 1 drop Both Eyes BID Shantanu Chamberlain PA-C        Today, Patient Was Seen By: Nuzhat Osorio DO    ** Please Note: "This note has been constructed using a voice recognition system  Therefore there may be syntax, spelling, and/or grammatical errors   Please call if you have any questions  "**

## 2019-01-13 NOTE — ASSESSMENT & PLAN NOTE
Blood pressure was initially elevated in the ER      Continue Coreg, Norvasc, Imdur  Discontinue home medications - verapamil, Avapro, hydrochlorothiazide as per Cardiology

## 2019-01-13 NOTE — ASSESSMENT & PLAN NOTE
there is no baseline available for comparison but patient's creatinine has remained stable in the 2s range which is likely his baseline  Repeat lab work after discharge and follow up with PCP/Cardiology

## 2019-01-13 NOTE — ASSESSMENT & PLAN NOTE
With no evidence of tamponade  Echo showed EF of 40% with grade 2 diastolic dysfunction    Moderate pericardial effusion was noted  He was on colchicine which was discontinued on discharge as per Cardiology recommendation  Repeat echo on 01/14 showed EF of 40% with small to moderate pericardial effusion

## 2019-01-14 ENCOUNTER — APPOINTMENT (INPATIENT)
Dept: NON INVASIVE DIAGNOSTICS | Facility: HOSPITAL | Age: 79
DRG: 280 | End: 2019-01-14
Payer: MEDICARE

## 2019-01-14 ENCOUNTER — APPOINTMENT (INPATIENT)
Dept: RADIOLOGY | Facility: HOSPITAL | Age: 79
DRG: 280 | End: 2019-01-14
Payer: MEDICARE

## 2019-01-14 PROBLEM — N18.30 STAGE 3 CHRONIC KIDNEY DISEASE (HCC): Status: ACTIVE | Noted: 2019-01-10

## 2019-01-14 LAB
ANION GAP SERPL CALCULATED.3IONS-SCNC: 7 MMOL/L (ref 4–13)
BUN SERPL-MCNC: 60 MG/DL (ref 5–25)
CALCIUM SERPL-MCNC: 8.3 MG/DL (ref 8.3–10.1)
CHLORIDE SERPL-SCNC: 97 MMOL/L (ref 100–108)
CO2 SERPL-SCNC: 28 MMOL/L (ref 21–32)
CREAT SERPL-MCNC: 2.21 MG/DL (ref 0.6–1.3)
ERYTHROCYTE [DISTWIDTH] IN BLOOD BY AUTOMATED COUNT: 13.6 % (ref 11.6–15.1)
GFR SERPL CREATININE-BSD FRML MDRD: 27 ML/MIN/1.73SQ M
GLUCOSE SERPL-MCNC: 158 MG/DL (ref 65–140)
GLUCOSE SERPL-MCNC: 170 MG/DL (ref 65–140)
GLUCOSE SERPL-MCNC: 201 MG/DL (ref 65–140)
GLUCOSE SERPL-MCNC: 311 MG/DL (ref 65–140)
GLUCOSE SERPL-MCNC: 312 MG/DL (ref 65–140)
HCT VFR BLD AUTO: 32.9 % (ref 36.5–49.3)
HGB BLD-MCNC: 10.6 G/DL (ref 12–17)
MCH RBC QN AUTO: 32.3 PG (ref 26.8–34.3)
MCHC RBC AUTO-ENTMCNC: 32.2 G/DL (ref 31.4–37.4)
MCV RBC AUTO: 100 FL (ref 82–98)
PLATELET # BLD AUTO: 138 THOUSANDS/UL (ref 149–390)
PMV BLD AUTO: 10.2 FL (ref 8.9–12.7)
POTASSIUM SERPL-SCNC: 4.3 MMOL/L (ref 3.5–5.3)
RBC # BLD AUTO: 3.28 MILLION/UL (ref 3.88–5.62)
SODIUM SERPL-SCNC: 132 MMOL/L (ref 136–145)
WBC # BLD AUTO: 5.59 THOUSAND/UL (ref 4.31–10.16)

## 2019-01-14 PROCEDURE — 93017 CV STRESS TEST TRACING ONLY: CPT

## 2019-01-14 PROCEDURE — A9502 TC99M TETROFOSMIN: HCPCS

## 2019-01-14 PROCEDURE — 93005 ELECTROCARDIOGRAM TRACING: CPT

## 2019-01-14 PROCEDURE — 99232 SBSQ HOSP IP/OBS MODERATE 35: CPT | Performed by: FAMILY MEDICINE

## 2019-01-14 PROCEDURE — 93016 CV STRESS TEST SUPVJ ONLY: CPT | Performed by: INTERNAL MEDICINE

## 2019-01-14 PROCEDURE — 93308 TTE F-UP OR LMTD: CPT

## 2019-01-14 PROCEDURE — 82948 REAGENT STRIP/BLOOD GLUCOSE: CPT

## 2019-01-14 PROCEDURE — 85027 COMPLETE CBC AUTOMATED: CPT | Performed by: FAMILY MEDICINE

## 2019-01-14 PROCEDURE — 93018 CV STRESS TEST I&R ONLY: CPT | Performed by: INTERNAL MEDICINE

## 2019-01-14 PROCEDURE — 99233 SBSQ HOSP IP/OBS HIGH 50: CPT | Performed by: INTERNAL MEDICINE

## 2019-01-14 PROCEDURE — 93325 DOPPLER ECHO COLOR FLOW MAPG: CPT | Performed by: INTERNAL MEDICINE

## 2019-01-14 PROCEDURE — 93321 DOPPLER ECHO F-UP/LMTD STD: CPT | Performed by: INTERNAL MEDICINE

## 2019-01-14 PROCEDURE — 78452 HT MUSCLE IMAGE SPECT MULT: CPT

## 2019-01-14 PROCEDURE — 80048 BASIC METABOLIC PNL TOTAL CA: CPT | Performed by: FAMILY MEDICINE

## 2019-01-14 PROCEDURE — 78452 HT MUSCLE IMAGE SPECT MULT: CPT | Performed by: INTERNAL MEDICINE

## 2019-01-14 PROCEDURE — 93308 TTE F-UP OR LMTD: CPT | Performed by: INTERNAL MEDICINE

## 2019-01-14 RX ORDER — FUROSEMIDE 10 MG/ML
20 INJECTION INTRAMUSCULAR; INTRAVENOUS
Status: DISCONTINUED | OUTPATIENT
Start: 2019-01-14 | End: 2019-01-15

## 2019-01-14 RX ORDER — ASPIRIN 81 MG/1
162 TABLET ORAL DAILY
Status: DISCONTINUED | OUTPATIENT
Start: 2019-01-15 | End: 2019-01-16 | Stop reason: HOSPADM

## 2019-01-14 RX ORDER — CARVEDILOL 6.25 MG/1
6.25 TABLET ORAL 2 TIMES DAILY WITH MEALS
Status: DISCONTINUED | OUTPATIENT
Start: 2019-01-14 | End: 2019-01-16 | Stop reason: HOSPADM

## 2019-01-14 RX ORDER — AMLODIPINE BESYLATE 5 MG/1
5 TABLET ORAL DAILY
Status: DISCONTINUED | OUTPATIENT
Start: 2019-01-14 | End: 2019-01-16 | Stop reason: HOSPADM

## 2019-01-14 RX ADMIN — HEPARIN SODIUM 5000 UNITS: 5000 INJECTION, SOLUTION INTRAVENOUS; SUBCUTANEOUS at 22:04

## 2019-01-14 RX ADMIN — HEPARIN SODIUM 5000 UNITS: 5000 INJECTION, SOLUTION INTRAVENOUS; SUBCUTANEOUS at 14:52

## 2019-01-14 RX ADMIN — FUROSEMIDE 40 MG: 10 INJECTION, SOLUTION INTRAMUSCULAR; INTRAVENOUS at 08:35

## 2019-01-14 RX ADMIN — TIMOLOL MALEATE 1 DROP: 5 SOLUTION/ DROPS OPHTHALMIC at 11:26

## 2019-01-14 RX ADMIN — LATANOPROST 1 DROP: 50 SOLUTION/ DROPS OPHTHALMIC at 22:06

## 2019-01-14 RX ADMIN — INSULIN LISPRO 1 UNITS: 100 INJECTION, SOLUTION INTRAVENOUS; SUBCUTANEOUS at 08:34

## 2019-01-14 RX ADMIN — COLCHICINE 0.3 MG: 0.6 TABLET, FILM COATED ORAL at 08:35

## 2019-01-14 RX ADMIN — TIMOLOL MALEATE 1 DROP: 5 SOLUTION/ DROPS OPHTHALMIC at 17:14

## 2019-01-14 RX ADMIN — AMLODIPINE BESYLATE 5 MG: 5 TABLET ORAL at 14:51

## 2019-01-14 RX ADMIN — INSULIN LISPRO 3 UNITS: 100 INJECTION, SOLUTION INTRAVENOUS; SUBCUTANEOUS at 12:29

## 2019-01-14 RX ADMIN — ATORVASTATIN CALCIUM 40 MG: 40 TABLET, FILM COATED ORAL at 16:14

## 2019-01-14 RX ADMIN — CARVEDILOL 6.25 MG: 6.25 TABLET, FILM COATED ORAL at 16:14

## 2019-01-14 RX ADMIN — HEPARIN SODIUM 5000 UNITS: 5000 INJECTION, SOLUTION INTRAVENOUS; SUBCUTANEOUS at 05:51

## 2019-01-14 RX ADMIN — CARVEDILOL 3.12 MG: 3.12 TABLET, FILM COATED ORAL at 09:08

## 2019-01-14 RX ADMIN — INSULIN LISPRO 3 UNITS: 100 INJECTION, SOLUTION INTRAVENOUS; SUBCUTANEOUS at 17:15

## 2019-01-14 RX ADMIN — REGADENOSON 0.4 MG: 0.08 INJECTION, SOLUTION INTRAVENOUS at 10:48

## 2019-01-14 RX ADMIN — ISOSORBIDE MONONITRATE 30 MG: 30 TABLET, EXTENDED RELEASE ORAL at 08:36

## 2019-01-14 RX ADMIN — ASPIRIN 81 MG: 81 TABLET, COATED ORAL at 08:35

## 2019-01-14 RX ADMIN — FUROSEMIDE 20 MG: 10 INJECTION, SOLUTION INTRAMUSCULAR; INTRAVENOUS at 16:15

## 2019-01-14 RX ADMIN — INSULIN LISPRO 1 UNITS: 100 INJECTION, SOLUTION INTRAVENOUS; SUBCUTANEOUS at 22:03

## 2019-01-14 RX ADMIN — DOCUSATE SODIUM 100 MG: 100 CAPSULE, LIQUID FILLED ORAL at 08:35

## 2019-01-14 RX ADMIN — DOCUSATE SODIUM 100 MG: 100 CAPSULE, LIQUID FILLED ORAL at 17:14

## 2019-01-14 NOTE — PROGRESS NOTES
Progress Note - Cardiology   [facility]     Suraj Jacques [de-identified] y o  male MRN: [id number]  : 1940  Unit/Bed#: [address] [place] [address] Encounter: [id number]    Assessment and Plan:   1  Acute on chronic combined systolic and diastolic heart failure with EF around 35-40%  Possible etiology may be ischemia  Patient has multiple risk factor  Other less likely etiology could be PVC mediated cardiomyopathy worse is viral cardiomyopathy though less likely  At this time he seems to be euvolemic  2  Pericardial effusion  Echo reviewed  Mild-to-moderate pericardial effusion no tamponade  Will monitor patient has been on low-dose colchicine  Will discontinue on discharge    3  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  Baseline not known  With monitor electrolytes    4  Essential hypertension  Need to better controlled  Will increase Coreg  Continue Imdur or  Will add amlodipine  Will hold off on hydralazine at this time due to some concern about pericardial effusion and questionable pericarditis, though less likely    5  Type 2 diabetes mellitus    6  Dyslipidemia and diabetes mellitus need to be on statin  Will start on Lipitor  7  Frequent PVCs and NSVT  Increase beta-blockers    Plan  Will review stress test  Increase Coreg    Add amlodipine for better blood pressure control  May need cardiac catheterization based on results of stress test   I am concerned about contrast nephropathy due to underlying kidney dysfunction  If cardiac catheterization is to be planned patient will need Nephrology consult  Discussed with patient at length  Discussed with medical team   Subjective / Objective:    Patient seen and evaluated  Feeling little bit better  Breathing has significantly improved  Blood pressure is still elevated  He is little anxious  Repeat echo shows mild-to-moderate pericardial effusion not worse actually may be little better  No tamponade  No chest pain    He does have low ejection fraction conformed on repeat echo also  He gets frequent PVCs  Had episode of asymptomatic NSVT on 01/12    Vitals: Blood pressure 170/75, pulse 63, temperature 97 9 °F (36 6 °C), temperature source Oral, resp  rate 19, height 5' 9" (1 753 m), weight 75 3 kg (166 lb 0 1 oz), SpO2 99 %  Vitals:    01/13/19 0550 01/14/19 0600   Weight: 75 3 kg (166 lb 0 1 oz) 75 3 kg (166 lb 0 1 oz)     Body mass index is 24 51 kg/m²  BP Readings from Last 3 Encounters:   01/14/19 170/75   12/19/18 (!) 172/82   05/23/18 152/57     Orthostatic Blood Pressures      Most Recent Value   Blood Pressure  170/75 filed at 01/14/2019 0730   Patient Position - Orthostatic VS  Lying filed at 01/14/2019 0730        I/O       01/12 0701 - 01/13 0700 01/13 0701 - 01/14 0700 01/14 0701 - 01/15 0700    P  O  720      I V  (mL/kg) 10 (0 1)      Total Intake(mL/kg) 730 (9 7)      Urine (mL/kg/hr) 1550 (0 9) 1200 (0 7) 675 (1 3)    Total Output 1550 1200 675    Net -820 -1200 -675           Unmeasured Urine Occurrence  2 x         Invasive Devices     Peripheral Intravenous Line            Peripheral IV 01/10/19 Left Antecubital 3 days    Peripheral IV 01/10/19 Right Wrist 3 days                  Intake/Output Summary (Last 24 hours) at 01/14/19 1355  Last data filed at 01/14/19 6795   Gross per 24 hour   Intake                0 ml   Output              825 ml   Net             -825 ml         Physical Exam:   Physical Exam    Neurologic:  Alert & oriented x 3,  no focal deficits noted, anxious  Constitutional:  Well developed, well nourished,  With no acute distress  Eyes:  PERRL, conjunctiva normal   HENT:  Atraumatic, external ears normal, nose normal,   NECK: Normal range of motion, no tenderness, neck is supple , No JVP  Respiratory:  Bilateral air entry mostly clear to auscultation, even at bases  Cardiovascular: S1-S2 irregularly irregular with 2/6 ejection systolic murmur    GI:  Soft, nondistended, normal bowel sounds, nontender, no hepatosplenomegaly appreciated  Musculoskeletal:  No tenderness, no deformities      Extremities:  No edema  Psychiatric:  Speech and behavior appropriate             Medications/ Allergies:       Current Facility-Administered Medications:  amLODIPine 5 mg Oral Daily Keily Crane MD   [START ON 1/15/2019] aspirin 162 mg Oral Daily Keily Crane MD   atorvastatin 40 mg Oral Daily With Brooks IonSUSANA   carvedilol 6 25 mg Oral BID With Meals Keily Crane MD   clobetasol  Topical Daily PRN Cuco Méndez PA-C   colchicine 0 3 mg Oral Daily SUSANA Snow   docusate sodium 100 mg Oral BID Phoebe Revankar, DO   furosemide 20 mg Intravenous BID (diuretic) Keily Crane MD   heparin (porcine) 5,000 Units Subcutaneous [facility] Cuco Méndez PA-C   insulin lispro 1-5 Units Subcutaneous TID AC SUSANA Snow   insulin lispro 1-5 Units Subcutaneous HS SUSANA Snow   isosorbide mononitrate 30 mg Oral Daily SUSANA Horn   latanoprost 1 drop Both Eyes HS TIFFANY CervantesC   polyethylene glycol 17 g Oral Daily PRN Phoebe Revankar, DO   regadenoson 0 4 mg Intravenous Once SUSANA Horn   timolol 1 drop Both Eyes BID TIM Cervantes-C       clobetasol  Daily PRN   polyethylene glycol 17 g Daily PRN     Allergies   Allergen Reactions    Sulfa Antibiotics     Sulfur        VTE Pharmacologic Prophylaxis:   Heparin    Labs:   Troponins:    Results from last 7 days  Lab Units 01/11/19  0450 01/10/19  2353 01/10/19  2058   TROPONIN I ng/mL 0 10* 0 12* 0 11*       CBC with diff:    Results from last 7 days  Lab Units 01/14/19  0555 01/11/19  0450 01/10/19  1731   WBC Thousand/uL 5 59 5 91 7 24   HEMOGLOBIN g/dL 10 6* 10 6* 11 6*   HEMATOCRIT % 32 9* 32 3* 37 0   MCV fL 100* 100* 103*   PLATELETS Thousands/uL 138* 123* 157   MCH pg 32 3 32 9 32 4   MCHC g/dL 32 2 32 8 31 4   RDW % 13 6 14 5 14 2   MPV fL 10 2 9 6 9 7   NRBC AUTO /100 WBCs  --   --  0 CMP:    Results from last 7 days  Lab Units 01/14/19  0555 01/13/19  1042 01/12/19  0535 01/11/19  2222 01/11/19  0450 01/10/19  1731   SODIUM mmol/L 132* 130* 137 137 139 139   POTASSIUM mmol/L 4 3 4 8 4 3 4 8 3 9 4 8   CHLORIDE mmol/L 97* 95* 102 101 104 102   CO2 mmol/L 28 28 26 27 26 26   ANION GAP mmol/L 7 7 9 9 9 11   BUN mg/dL 60* 57* 53* 57* 56* 56*   CREATININE mg/dL 2 21* 2 36* 2 29* 2 41* 2 30* 2 43*   CALCIUM mg/dL 8 3 8 3 8 5 8 6 8 3 8 6   AST U/L  --   --   --   --  17 21   ALT U/L  --   --   --   --  24 26   ALK PHOS U/L  --   --   --   --  105 125*   TOTAL PROTEIN g/dL  --   --   --   --  5 8* 6 4   ALBUMIN g/dL  --   --   --   --  3 1* 3 5   TOTAL BILIRUBIN mg/dL  --   --   --   --  0 80 0 80   EGFR ml/min/1 73sq m 27 25 26 24 26 24       Magnesium:    Results from last 7 days  Lab Units 01/12/19  0535 01/11/19 2222 01/11/19  0450   MAGNESIUM mg/dL 2 3 2 1 1 6     Coags:    Results from last 7 days  Lab Units 01/11/19  0450 01/10/19  1731   PTT seconds  --  31   INR  1 10 1 09     TSH:    Results from last 7 days  Lab Units 01/11/19  1001   TSH 3RD GENERATON uIU/mL 1 816     Hgb A1c:    Results from last 7 days  Lab Units 01/10/19  2058   HEMOGLOBIN A1C % 7 4*         Imaging & Testing   I have personally reviewed pertinent reports  Xr Chest 1 View Portable    Result Date: 1/11/2019  Narrative: CHEST INDICATION:   SOB  COMPARISON:  X-ray 8/11/08 EXAM PERFORMED/VIEWS:  XR CHEST PORTABLE FINDINGS:  Tubing projects over the right neck base and mediastinum, is external  Enlarged cardiomediastinal silhouette  The lungs are clear  No pneumothorax or pleural effusion  Osseous structures appear within normal limits for patient age  Impression: Enlarged cardiomediastinal silhouette  Pericardial effusion versus cardiomegaly  The study was marked in [facility] for immediate notification  Workstation performed: [id number]        EKG / Monitor: Personally reviewed      Monitor shows normal sinus rhythm with frequent PVCs  An episode of 5 beat irregular NSVT noted on  which was asymptomatic  Cardiac testing:   Results for orders placed during the hospital encounter of 01/10/19   Echo complete with contrast if indicated    Narrative [address]  [address] [facility] [address]  [phone]    Transthoracic Echocardiogram  2D, M-mode, Doppler, and Color Doppler    Study date:  2019    Patient: Mahesh Costa  MR number: [id number]  Account number: [de-identified]  : 1940  Age: [de-identified] years  Gender: Male  Status: Inpatient  Location: Bedside  Height: 69 in 69 in  Weight: 159 lb 158 6 lb  BP: 127/ 61 mmHg    Indications: Heart Failure, Shortness of Breath x1 week as per patient  Diagnoses: I50 9 - Heart failure, unspecified    Sonographer:  PELON Hunter, RVS  Primary Physician:  Catrachita Bourgeois DO  Referring Physician:  Felecia Osullivan PA-C  Group:  [facility]  Interpreting Physician:  Cheyanne Sauer MD    SUMMARY    LEFT VENTRICLE:  Systolic function was mildly to moderately reduced  Ejection fraction was estimated in the range of 40 % to 45 % to be 40 %  There was moderate diffuse hypokinesis  Wall thickness was moderately increased  Features were consistent with a pseudonormal left ventricular filling pattern, with concomitant abnormal relaxation and increased filling pressure (grade 2 diastolic dysfunction)  RIGHT VENTRICLE:  Systolic function was mildly reduced by TAPSE-1 4cm    LEFT ATRIUM:  The atrium was mildly dilated  MITRAL VALVE:  There was trace regurgitation  IVC, HEPATIC VEINS:  The inferior vena cava was mildly dilated  PERICARDIUM:  A moderate, free-flowing pericardial effusion was identified  There was no evidence of hemodynamic compromise  HISTORY: PRIOR HISTORY: CHF, Diabetes, HTN, PVCs, Former Smoker    PROCEDURE: The procedure was performed at the bedside  This was a routine study   The transthoracic approach was used  The study included complete 2D imaging, M-mode, complete spectral Doppler, and color Doppler  The heart rate was 72 bpm,  at the start of the study  Images were obtained from the parasternal, apical, and subcostal acoustic windows  Images were not obtained from the suprasternal notch acoustic windows  Image quality was adequate  LEFT VENTRICLE: Size was normal  Systolic function was mildly to moderately reduced  Ejection fraction was estimated in the range of 40 % to 45 % to be 40 %  There was moderate diffuse hypokinesis  Wall thickness was moderately increased  No evidence of apical thrombus  DOPPLER: Features were consistent with a pseudonormal left ventricular filling pattern, with concomitant abnormal relaxation and increased filling pressure (grade 2 diastolic dysfunction)  RIGHT VENTRICLE: The size was normal  Systolic function was mildly reduced by TAPSE-1 4cm Wall thickness was normal     LEFT ATRIUM: The atrium was mildly dilated  RIGHT ATRIUM: Size was normal     MITRAL VALVE: Valve structure was normal  There was normal leaflet separation  DOPPLER: The transmitral velocity was within the normal range  There was no evidence for stenosis  There was trace regurgitation  AORTIC VALVE: The valve was trileaflet  Leaflets exhibited mildly increased thickness and normal cuspal separation  DOPPLER: Transaortic velocity was within the normal range  There was no evidence for stenosis  There was no significant  regurgitation  TRICUSPID VALVE: The valve structure was normal  There was normal leaflet separation  DOPPLER: The transtricuspid velocity was within the normal range  There was no evidence for stenosis  There was no significant regurgitation  PULMONIC VALVE: Leaflets exhibited normal thickness, no calcification, and normal cuspal separation  DOPPLER: The transpulmonic velocity was within the normal range  There was no significant regurgitation      PERICARDIUM: A moderate, free-flowing pericardial effusion was identified  There was no evidence of hemodynamic compromise  AORTA: The root exhibited normal size  SYSTEMIC VEINS: IVC: The inferior vena cava was mildly dilated  SYSTEM MEASUREMENT TABLES    2D mode  AoR Diam 2D: 3 2 cm  LA Diam (2D): 4 3 cm  LA/Ao (2D): 1 34  FS (2D Teich): 22 9 %  IVSd (2D): 1 41 cm  LVDEV: 137 cm³  LVEDV MOD BP: 178 cm³  LVESV: 74 2 cm³  LVIDd(2D): 5 32 cm  LVISd (2D): 4 1 cm  LVPWd (2D): 1 42 cm  SV (Teich): 62 8 cm³    Apical four chamber  LVEF A4C: 40 %    Apical two chamber  LVEF A2C: 45 %    Unspecified Scan Mode  MV Peak A Mitchell: 883 mm/s  MV Peak E Mitchell  Mean: 1080 mm/s  Max P mm[Hg]  V Max: 2990 mm/s  Vmax: 2920 mm/s  RA Area: 11 8 cm squared  RA Volume: 25 6 cm³  TAPSE: 1 4 cm    [employer] Accredited Echocardiography Laboratory    Prepared and electronically signed by    Richa Mckenzie MD  Signed 2019 18:14:27             Dr Sierra Aj MD [facility]      "This note has been constructed using a voice recognition system  Therefore there may be syntax, spelling, and/or grammatical errors   Please call if you have any questions  "

## 2019-01-14 NOTE — UTILIZATION REVIEW
Continued Stay Review  Date: 1/14/19  Vital Signs: /75 (BP Location: Right arm)   Pulse 63   Temp 97 9 °F (36 6 °C) (Oral)   Resp 19   Ht 5' 9" (1 753 m)   Wt 75 3 kg (166 lb 0 1 oz)   SpO2 99%   BMI 24 51 kg/m²   Assessment/Plan:   PERSISTENT STAPLES, LUNGS WITH DIMINISHED BREATH SOUNDS  IV LASIX CONTINUES BID FOR CHF PER CARDIO   SCHEDULED FOR STRESS TESTING TODAY  Medications:   Scheduled Meds:   Current Facility-Administered Medications:  aspirin 81 mg Oral Daily SUSANA Schilling   atorvastatin 40 mg Oral Daily With Vonnie Frame, SUSANA   carvedilol 3 125 mg Oral BID With Meals SUSANA Schilling   clobetasol  Topical Daily PRN Lieutenant Patel, PA-C   colchicine 0 3 mg Oral Daily SUSANA Cervantes   docusate sodium 100 mg Oral BID Phoebe Revankar, DO   furosemide 40 mg Intravenous BID (diuretic) SUSANA Schilling   heparin (porcine) 5,000 Units Subcutaneous Replaced by [facility] Lieutenant Patel, PA-C   insulin lispro 1-5 Units Subcutaneous TID AC SUSANA Cervantes   insulin lispro 1-5 Units Subcutaneous HS SUSANA Cervantes   isosorbide mononitrate 30 mg Oral Daily SUSANA Schilling   latanoprost 1 drop Both Eyes HS Lieutenant Patel, PA-C   polyethylene glycol 17 g Oral Daily PRN Phoebe Revankar, DO   timolol 1 drop Both Eyes BID Fermint Jorge, PA-C     Continuous Infusions:    PRN Meds: clobetasol    polyethylene glycol  Pertinent Labs/Diagnostic Results:    CL 97 BUN 60 CR 2 21 GLUC 158 GFR 27 HGB 10 6   Age/Sex: [de-identified] y o  male   Discharge Plan: TBD

## 2019-01-14 NOTE — PROGRESS NOTES
[address] Internal Medicine Progress Note  Patient: Bonnie Menchaca [de-identified] y o  male   MRN: [id number]  PCP: Quentin Rawls DO  Unit/Bed#: [address] [place] [address] Encounter: [id number]  Date Of Visit: 01/14/19    Problem List:    Principal Problem:    Acute respiratory failure with hypoxia ([facility] )  Active Problems:    Acute combined systolic and diastolic congestive heart failure (HCC)    NSTEMI (non-ST elevated myocardial infarction) ([facility] )    Pericardial effusion    PVC (premature ventricular contraction)    Essential hypertension    Stage 3 chronic kidney disease ([facility] )    Controlled type 2 diabetes mellitus, without long-term current use of insulin ([facility])      Assessment & Plan:    * Acute respiratory failure with hypoxia ([facility] )   Assessment & Plan    Likely due to CHF exacerbation  Required BiPAP support initially and was in the ICU initially  Continue supplemental oxygen by nasal cannula and wean off as tolerated  Acute combined systolic and diastolic congestive heart failure [facility])   Assessment & Plan    Appreciate Cardiology input  Decreased to 20 mg of IV Lasix b i d  Continue Coreg     Pericardial effusion   Assessment & Plan    With no evidence of tamponade  Echo showed EF of 40% with grade 2 diastolic dysfunction  Moderate pericardial effusion was noted  Continue Colcrys  Repeat echo today showed EF of 40% with small to moderate pericardial effusion     NSTEMI (non-ST elevated myocardial infarction) [facility])   Assessment & Plan    Likely NSTEMI type 2 from CHF exacerbation  Continue aspirin, Coreg, Imdur, statin  stress test was abnormal with fixed inferolateral wall defect but no clear ischemia  EF was noted to be 37%    Moderate-sized infarct noted  Patient will follow up with Cardiology after discharge     Controlled type 2 diabetes mellitus, without long-term current use of insulin [facility])   Assessment & Plan    Lab Results   Component Value Date    HGBA1C 7 4 (H) 01/10/2019       Recent Labs      01/13/19   5310 19   0718  19   1118  19   1629   POCGLU  152*  170*  311*  312*     Holding metformin at this time  Continue sliding scale insulin  Stage 3 chronic kidney disease ([address] )   Assessment & Plan    there is no baseline available for comparison but patient's creatinine has remained stable in the 2s range which is likely his baseline  Repeat lab work in a m  While on diuretics     Essential hypertension   Assessment & Plan    Blood pressure was initially elevated in the ER  BPs have now improved  Dose of Coreg increased  Continue Imdur     PVC (premature ventricular contraction)   Assessment & Plan    Patient with frequent PVCs and NSVT  Dose of Coreg was increased           VTE Pharmacologic Prophylaxis:   Pharmacologic: Heparin  Mechanical VTE Prophylaxis in Place: Yes    Patient Centered Rounds: I have performed bedside rounds with nursing staff today  Discussions with Specialists or Other Care Team Provider: Yes    Education and Discussions with Family / Patient:Yes    Time Spent for Care: 30 minutes  More than 50% of total time spent on counseling and coordination of care as described above  Current Length of Stay: 4 day(s)    Current Patient Status: Inpatient     Discharge Plan: home    Code Status: Level 1 - Full Code    Certification Statement: The patient will continue to require additional inpatient hospital stay due to CHF exacerbation      Subjective:   States breathing and swelling in his legs is improving    Objective:     Vitals:   Temp (24hrs), Av 1 °F (36 7 °C), Min:97 5 °F (36 4 °C), Max:98 7 °F (37 1 °C)    Temp:  [97 5 °F (36 4 °C)-98 7 °F (37 1 °C)] 97 5 °F (36 4 °C)  HR:  [57-63] 60  Resp:  [19-20] 20  BP: (117-170)/(56-75) 163/67  SpO2:  [99 %] 99 %  Body mass index is 24 51 kg/m²  Input and Output Summary (last 24 hours):        Intake/Output Summary (Last 24 hours) at 19 1936  Last data filed at 19 1619   Gross per 24 hour   Intake                0 ml   Output             1175 ml   Net            -1175 ml       Physical Exam:     Physical Exam   Constitutional: He is oriented to person, place, and time  He appears well-developed and well-nourished  No distress  HENT:   Head: Normocephalic and atraumatic  Mouth/Throat: Oropharynx is clear and moist    Eyes: Conjunctivae are normal  Right eye exhibits no discharge  Left eye exhibits no discharge  Neck: Neck supple  Cardiovascular:   Murmur heard  Irregular   Pulmonary/Chest: Effort normal  No respiratory distress  He has no wheezes  He has no rales  Abdominal: Soft  Bowel sounds are normal  He exhibits no distension  There is no tenderness  Musculoskeletal: He exhibits edema (improving)  Neurological: He is alert and oriented to person, place, and time  No cranial nerve deficit  Skin: Skin is dry  He is not diaphoretic  Psychiatric: He has a normal mood and affect  Additional Data:     Labs:      Results from last 7 days  Lab Units 01/14/19  0555 01/11/19  0450   WBC Thousand/uL 5 59 5 91   HEMOGLOBIN g/dL 10 6* 10 6*   HEMATOCRIT % 32 9* 32 3*   PLATELETS Thousands/uL 138* 123*   NEUTROS PCT %  --  65   LYMPHS PCT %  --  19   MONOS PCT %  --  15*   EOS PCT %  --  1       Results from last 7 days  Lab Units 01/14/19  0555  01/11/19  0450   POTASSIUM mmol/L 4 3  < > 3 9   CHLORIDE mmol/L 97*  < > 104   CO2 mmol/L 28  < > 26   BUN mg/dL 60*  < > 56*   CREATININE mg/dL 2 21*  < > 2 30*   CALCIUM mg/dL 8 3  < > 8 3   ALK PHOS U/L  --   --  105   ALT U/L  --   --  24   AST U/L  --   --  17   < > = values in this interval not displayed  Results from last 7 days  Lab Units 01/11/19  0450   INR  1 10       * I Have Reviewed All Lab Data Listed Above  * Additional Pertinent Lab Tests Reviewed:  All Labs For Current Hospital Admission Reviewed    Imaging:  Xr Chest 1 View Portable    Result Date: 1/11/2019  Narrative: CHEST INDICATION:   SOB  COMPARISON:  X-ray 8/11/08 EXAM PERFORMED/VIEWS:  XR CHEST PORTABLE FINDINGS:  Tubing projects over the right neck base and mediastinum, is external  Enlarged cardiomediastinal silhouette  The lungs are clear  No pneumothorax or pleural effusion  Osseous structures appear within normal limits for patient age  Impression: Enlarged cardiomediastinal silhouette  Pericardial effusion versus cardiomegaly  The study was marked in [facility] for immediate notification  Workstation performed: [id number]     Imaging Reports Reviewed by myself    Cultures:   Blood Culture: No results found for: BLOODCX  Urine Culture: No results found for: URINECX  Sputum Culture: No components found for: SPUTUMCX  Wound Culture: No results found for: WOUNDCULT    Last 24 Hours Medication List:     Current Facility-Administered Medications:  amLODIPine 5 mg Oral Daily Asha Shanks MD   [START ON 1/15/2019] aspirin 162 mg Oral Daily Asha Shanks MD   atorvastatin 40 mg Oral Daily With SUSANA William   carvedilol 6 25 mg Oral BID With Meals Asha Shanks MD   clobetasol  Topical Daily PRN TIM Roman-C   colchicine 0 3 mg Oral Daily Andie Muff, CRNP   docusate sodium 100 mg Oral BID Phoebe Revankar, DO   furosemide 20 mg Intravenous BID (diuretic) Asha Shanks MD   heparin (porcine) 5,000 Units Subcutaneous [facility] TIM Roman-C   insulin lispro 1-5 Units Subcutaneous TID AC Andie Muff, CRNP   insulin lispro 1-5 Units Subcutaneous HS Andie Muff, CRNP   isosorbide mononitrate 30 mg Oral Daily SUSANA aGrcia   latanoprost 1 drop Both Eyes HS Julio Morales PA-C   polyethylene glycol 17 g Oral Daily PRN Phoebe Revankar, DO   timolol 1 drop Both Eyes BID TIFFANY RomanC        Today, Patient Was Seen By: Ziggy Torres DO    ** Please Note: "This note has been constructed using a voice recognition system  Therefore there may be syntax, spelling, and/or grammatical errors   Please call if you have any questions  "**

## 2019-01-15 LAB
ANA HOMOGEN SER QL IF: NORMAL
ANA HOMOGEN TITR SER: NORMAL {TITER}
ANION GAP SERPL CALCULATED.3IONS-SCNC: 6 MMOL/L (ref 4–13)
ATRIAL RATE: 73 BPM
ATRIAL RATE: 74 BPM
BUN SERPL-MCNC: 62 MG/DL (ref 5–25)
CALCIUM SERPL-MCNC: 8.4 MG/DL (ref 8.3–10.1)
CHEST PAIN STATEMENT: NORMAL
CHLORIDE SERPL-SCNC: 99 MMOL/L (ref 100–108)
CHOLEST SERPL-MCNC: 102 MG/DL (ref 50–200)
CO2 SERPL-SCNC: 29 MMOL/L (ref 21–32)
CREAT SERPL-MCNC: 2.34 MG/DL (ref 0.6–1.3)
GFR SERPL CREATININE-BSD FRML MDRD: 25 ML/MIN/1.73SQ M
GLUCOSE SERPL-MCNC: 203 MG/DL (ref 65–140)
GLUCOSE SERPL-MCNC: 224 MG/DL (ref 65–140)
GLUCOSE SERPL-MCNC: 234 MG/DL (ref 65–140)
GLUCOSE SERPL-MCNC: 267 MG/DL (ref 65–140)
GLUCOSE SERPL-MCNC: 320 MG/DL (ref 65–140)
HDLC SERPL-MCNC: 34 MG/DL (ref 40–60)
LDLC SERPL CALC-MCNC: 54 MG/DL (ref 0–100)
MAX DIASTOLIC BP: 72 MMHG
MAX HEART RATE: 87 BPM
MAX PREDICTED HEART RATE: 140 BPM
MAX. SYSTOLIC BP: 188 MMHG
NONHDLC SERPL-MCNC: 68 MG/DL
P AXIS: 45 DEGREES
P AXIS: 49 DEGREES
PLATELET # BLD AUTO: 146 THOUSANDS/UL (ref 149–390)
PMV BLD AUTO: 10.2 FL (ref 8.9–12.7)
POTASSIUM SERPL-SCNC: 4.7 MMOL/L (ref 3.5–5.3)
PR INTERVAL: 150 MS
PR INTERVAL: 150 MS
PROTOCOL NAME: NORMAL
QRS AXIS: -29 DEGREES
QRS AXIS: -29 DEGREES
QRSD INTERVAL: 96 MS
QRSD INTERVAL: 98 MS
QT INTERVAL: 374 MS
QT INTERVAL: 396 MS
QTC INTERVAL: 415 MS
QTC INTERVAL: 436 MS
REASON FOR TERMINATION: NORMAL
RYE IGE QN: POSITIVE
SODIUM SERPL-SCNC: 134 MMOL/L (ref 136–145)
T WAVE AXIS: 81 DEGREES
T WAVE AXIS: 82 DEGREES
TARGET HR FORMULA: NORMAL
TEST INDICATION: NORMAL
TIME IN EXERCISE PHASE: NORMAL
TRIGL SERPL-MCNC: 70 MG/DL
VENTRICULAR RATE: 73 BPM
VENTRICULAR RATE: 74 BPM

## 2019-01-15 PROCEDURE — 80048 BASIC METABOLIC PNL TOTAL CA: CPT | Performed by: INTERNAL MEDICINE

## 2019-01-15 PROCEDURE — 99232 SBSQ HOSP IP/OBS MODERATE 35: CPT | Performed by: INTERNAL MEDICINE

## 2019-01-15 PROCEDURE — 99232 SBSQ HOSP IP/OBS MODERATE 35: CPT | Performed by: FAMILY MEDICINE

## 2019-01-15 PROCEDURE — 93010 ELECTROCARDIOGRAM REPORT: CPT | Performed by: INTERNAL MEDICINE

## 2019-01-15 PROCEDURE — 85049 AUTOMATED PLATELET COUNT: CPT | Performed by: FAMILY MEDICINE

## 2019-01-15 PROCEDURE — 82948 REAGENT STRIP/BLOOD GLUCOSE: CPT

## 2019-01-15 PROCEDURE — 80061 LIPID PANEL: CPT | Performed by: INTERNAL MEDICINE

## 2019-01-15 RX ORDER — FUROSEMIDE 40 MG/1
40 TABLET ORAL DAILY
Status: DISCONTINUED | OUTPATIENT
Start: 2019-01-15 | End: 2019-01-15

## 2019-01-15 RX ORDER — FUROSEMIDE 40 MG/1
40 TABLET ORAL DAILY
Status: DISCONTINUED | OUTPATIENT
Start: 2019-01-16 | End: 2019-01-16 | Stop reason: HOSPADM

## 2019-01-15 RX ORDER — FUROSEMIDE 10 MG/ML
20 INJECTION INTRAMUSCULAR; INTRAVENOUS ONCE
Status: COMPLETED | OUTPATIENT
Start: 2019-01-15 | End: 2019-01-15

## 2019-01-15 RX ADMIN — CARVEDILOL 6.25 MG: 6.25 TABLET, FILM COATED ORAL at 16:30

## 2019-01-15 RX ADMIN — INSULIN LISPRO 2 UNITS: 100 INJECTION, SOLUTION INTRAVENOUS; SUBCUTANEOUS at 22:23

## 2019-01-15 RX ADMIN — FUROSEMIDE 20 MG: 10 INJECTION, SOLUTION INTRAMUSCULAR; INTRAVENOUS at 16:22

## 2019-01-15 RX ADMIN — DOCUSATE SODIUM 100 MG: 100 CAPSULE, LIQUID FILLED ORAL at 18:11

## 2019-01-15 RX ADMIN — HEPARIN SODIUM 5000 UNITS: 5000 INJECTION, SOLUTION INTRAVENOUS; SUBCUTANEOUS at 22:23

## 2019-01-15 RX ADMIN — TIMOLOL MALEATE 1 DROP: 5 SOLUTION/ DROPS OPHTHALMIC at 10:08

## 2019-01-15 RX ADMIN — FUROSEMIDE 20 MG: 10 INJECTION, SOLUTION INTRAMUSCULAR; INTRAVENOUS at 10:05

## 2019-01-15 RX ADMIN — HEPARIN SODIUM 5000 UNITS: 5000 INJECTION, SOLUTION INTRAVENOUS; SUBCUTANEOUS at 13:17

## 2019-01-15 RX ADMIN — CARVEDILOL 6.25 MG: 6.25 TABLET, FILM COATED ORAL at 10:04

## 2019-01-15 RX ADMIN — ATORVASTATIN CALCIUM 40 MG: 40 TABLET, FILM COATED ORAL at 16:22

## 2019-01-15 RX ADMIN — INSULIN LISPRO 2 UNITS: 100 INJECTION, SOLUTION INTRAVENOUS; SUBCUTANEOUS at 10:09

## 2019-01-15 RX ADMIN — AMLODIPINE BESYLATE 5 MG: 5 TABLET ORAL at 10:06

## 2019-01-15 RX ADMIN — INSULIN LISPRO 3 UNITS: 100 INJECTION, SOLUTION INTRAVENOUS; SUBCUTANEOUS at 16:36

## 2019-01-15 RX ADMIN — COLCHICINE 0.3 MG: 0.6 TABLET, FILM COATED ORAL at 10:06

## 2019-01-15 RX ADMIN — ISOSORBIDE MONONITRATE 30 MG: 30 TABLET, EXTENDED RELEASE ORAL at 10:07

## 2019-01-15 RX ADMIN — LATANOPROST 1 DROP: 50 SOLUTION/ DROPS OPHTHALMIC at 22:24

## 2019-01-15 RX ADMIN — DOCUSATE SODIUM 100 MG: 100 CAPSULE, LIQUID FILLED ORAL at 10:07

## 2019-01-15 RX ADMIN — TIMOLOL MALEATE 1 DROP: 5 SOLUTION/ DROPS OPHTHALMIC at 18:11

## 2019-01-15 RX ADMIN — ASPIRIN 162 MG: 81 TABLET, COATED ORAL at 10:07

## 2019-01-15 RX ADMIN — INSULIN LISPRO 3 UNITS: 100 INJECTION, SOLUTION INTRAVENOUS; SUBCUTANEOUS at 12:53

## 2019-01-15 RX ADMIN — HEPARIN SODIUM 5000 UNITS: 5000 INJECTION, SOLUTION INTRAVENOUS; SUBCUTANEOUS at 06:03

## 2019-01-15 NOTE — SOCIAL WORK
CM advised that pt is in need of life vest prior to his DC  Pt spoke with Cardio and pt is agreeable to the vest at this time  Referral out to [facility] and call made to [facility] for follow up

## 2019-01-15 NOTE — CONSULTS
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

## 2019-01-15 NOTE — PROGRESS NOTES
Progress Note - Cardiology   [facility]     Bethel Novoa [de-identified] y o  male MRN: [id number]  : 1940  Unit/Bed#: [address] [place] [address] Encounter: [id number]    Assessment and Plan:   1  Acute on chronic combined systolic and diastolic heart failure:  EF around 35-40%  Nuclear stress test with fixed defect  Patient with frequent PVCs on monitor with runs of VT  Would recommend optimal medical therapy with beta-blocker, nitrates, and calcium channel blocker  Transition IV Lasix to oral Lasix in a m  Omar Simple Discussed with patient that he will need LifeVest at time of discharge  2  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:  Baseline serum creatinine appears to be around 2 2  Continue to monitor on medication  3  Pericardial effusion:  Appears to be slight improvement on limited echo of 2019  Colchicine as ordered  4  Essential hypertension:  Continue current medications and monitor vital signs  5  Dyslipidemia:  On statin therapy    6  Frequent PVCs, bigeminy, and NSVT:  Continue beta-blocker and titrate as patient tolerates  Recommend life vest at time of discharge    7  Diabetes:  Managed per primary team      Subjective / Objective:   Patient seen examined  States that he is feeling much better  Nuclear stress test performed yesterday showed a fixed inferior wall defect with ejection fracture estimated at 37%  This correlates closely with echocardiogram   Patient on monitor with sinus rhythm with bigeminy and short runs of nonsustained VT  In light of depresse depressed LV function with fixed inferior wall deficit and presence of ventricular ectopy it is recommended the patient be discharged with a LifeVest   This was discussed at length with patient  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  Discussed with him risk of sudden cardiac death without life vest/ICD    Also discussed with him option of diagnostic left heart catheterization to determine if patient has obstructive coronary artery disease  Patient with chronic kidney disease stage 3 to 4 and contrast would be used for procedure  Discussed patient's risk for worsening renal function and risk that outcome would confirm need for said life vest   Patient to discuss with family prior to making any further decision    Vitals: Blood pressure 116/56, pulse 55, temperature 97 6 °F (36 4 °C), resp  rate 20, height 5' 9" (1 753 m), weight 72 9 kg (160 lb 11 5 oz), SpO2 99 %  Vitals:    01/14/19 0600 01/15/19 0600   Weight: 75 3 kg (166 lb 0 1 oz) 72 9 kg (160 lb 11 5 oz)     Body mass index is 23 73 kg/m²  BP Readings from Last 3 Encounters:   01/15/19 116/56   12/19/18 (!) 172/82   05/23/18 152/57     Orthostatic Blood Pressures      Most Recent Value   Blood Pressure  116/56 filed at 01/15/2019 1258   Patient Position - Orthostatic VS  Lying filed at 01/15/2019 1258        I/O       01/13 0701 - 01/14 0700 01/14 0701 - 01/15 0700 01/15 0701 - 01/16 0700    P  O    260    Total Intake(mL/kg)   260 (3 6)    Urine (mL/kg/hr) 1200 (0 7) 2275 (1 3) 350 (0 7)    Total Output 1200 2275 350    Net -1200 -2275 -90           Unmeasured Urine Occurrence 2 x          Invasive Devices     Peripheral Intravenous Line            Peripheral IV 01/14/19 Right Forearm less than 1 day                  Intake/Output Summary (Last 24 hours) at 01/15/19 1333  Last data filed at 01/15/19 1201   Gross per 24 hour   Intake              260 ml   Output             1950 ml   Net            -1690 ml         Physical Exam:   Physical Exam   Constitutional: He is oriented to person, place, and time  He appears well-developed and well-nourished  No distress  HENT:   Head: Normocephalic and atraumatic  Right Ear: External ear normal    Left Ear: External ear normal    Eyes: Pupils are equal, round, and reactive to light  Conjunctivae are normal  Right eye exhibits no discharge   Left eye exhibits no discharge  No scleral icterus  Neck: Normal range of motion  Neck supple  No thyromegaly present  Cardiovascular: Normal rate  An irregular rhythm present  Exam reveals no gallop  Murmur heard  Systolic murmur is present with a grade of 1/6   Abdominal: Soft  Bowel sounds are normal  He exhibits no distension  Musculoskeletal:   Trace pedal and ankle edema   Neurological: He is alert and oriented to person, place, and time  Skin: Skin is warm and dry  He is not diaphoretic  Psychiatric: He has a normal mood and affect  Nursing note and vitals reviewed              Medications/ Allergies:     Current Facility-Administered Medications:  amLODIPine 5 mg Oral Daily Jc Ledezma MD   aspirin 162 mg Oral Daily Jc Ledezma MD   atorvastatin 40 mg Oral Daily With Davene MorningSUSANA   carvedilol 6 25 mg Oral BID With Meals Jc Ledezma MD   clobetasol  Topical Daily PRN Evert Rivera PA-C   colchicine 0 3 mg Oral Daily SUSANA Alvarez   docusate sodium 100 mg Oral BID Phoebe Revankar, DO   furosemide 20 mg Intravenous Once SUSANA Ga   [START ON 1/16/2019] furosemide 40 mg Oral Daily SUSANA Ga   heparin (porcine) 5,000 Units Subcutaneous [facility] TIM Michele-C   insulin lispro 1-5 Units Subcutaneous TID AC SUSANA Alvarez   insulin lispro 1-5 Units Subcutaneous HS SUSANA Alvarez   isosorbide mononitrate 30 mg Oral Daily SUSANA Ga   latanoprost 1 drop Both Eyes HS Evert Rivera PA-C   polyethylene glycol 17 g Oral Daily PRN Phoebe Revankar, DO   timolol 1 drop Both Eyes BID TIM Michele-C       clobetasol  Daily PRN   polyethylene glycol 17 g Daily PRN     Allergies   Allergen Reactions    Sulfa Antibiotics     Sulfur        VTE Pharmacologic Prophylaxis:   Sequential compression device (Venodyne)     Labs:   Troponins:  Results from last 7 days  Lab Units 01/11/19  0450 01/10/19  2353 01/10/19  2058   TROPONIN I ng/mL 0 10* 0 12* 0 11*     CBC with diff:  Results from last 7 days  Lab Units 01/15/19  0608 01/14/19  0555 01/11/19  0450 01/10/19  1731   WBC Thousand/uL  --  5 59 5 91 7 24   HEMOGLOBIN g/dL  --  10 6* 10 6* 11 6*   HEMATOCRIT %  --  32 9* 32 3* 37 0   MCV fL  --  100* 100* 103*   PLATELETS Thousands/uL 146* 138* 123* 157   MCH pg  --  32 3 32 9 32 4   MCHC g/dL  --  32 2 32 8 31 4   RDW %  --  13 6 14 5 14 2   MPV fL 10 2 10 2 9 6 9 7   NRBC AUTO /100 WBCs  --   --   --  0     CMP:  Results from last 7 days  Lab Units 01/15/19  0608 01/14/19  0555 01/13/19  1042 01/12/19  0535 01/11/19  2222 01/11/19  0450 01/10/19  1731   SODIUM mmol/L 134* 132* 130* 137 137 139 139   POTASSIUM mmol/L 4 7 4 3 4 8 4 3 4 8 3 9 4 8   CHLORIDE mmol/L 99* 97* 95* 102 101 104 102   CO2 mmol/L 29 28 28 26 27 26 26   ANION GAP mmol/L 6 7 7 9 9 9 11   BUN mg/dL 62* 60* 57* 53* 57* 56* 56*   CREATININE mg/dL 2 34* 2 21* 2 36* 2 29* 2 41* 2 30* 2 43*   CALCIUM mg/dL 8 4 8 3 8 3 8 5 8 6 8 3 8 6   AST U/L  --   --   --   --   --  17 21   ALT U/L  --   --   --   --   --  24 26   ALK PHOS U/L  --   --   --   --   --  105 125*   TOTAL PROTEIN g/dL  --   --   --   --   --  5 8* 6 4   ALBUMIN g/dL  --   --   --   --   --  3 1* 3 5   TOTAL BILIRUBIN mg/dL  --   --   --   --   --  0 80 0 80   EGFR ml/min/1 73sq m 25 27 25 26 24 26 24     Magnesium:  Results from last 7 days  Lab Units 01/12/19  0535 01/11/19 2222 01/11/19  0450   MAGNESIUM mg/dL 2 3 2 1 1 6     Coags:  Results from last 7 days  Lab Units 01/11/19  0450 01/10/19  1731   PTT seconds  --  31   INR  1 10 1 09     TSH:  Results from last 7 days  Lab Units 01/11/19  1001   TSH 3RD GENERATON uIU/mL 1 816     Lipid Profile:  Results from last 7 days  Lab Units 01/15/19  0608   CHOLESTEROL mg/dL 102   TRIGLYCERIDES mg/dL 70   HDL mg/dL 34*   LDL CALC mg/dL 54     Hgb A1c:  Results from last 7 days  Lab Units 01/10/19  2058   HEMOGLOBIN A1C % 7 4*        Imaging & Testing   I have personally reviewed pertinent reports  Xr Chest 1 View Portable    Result Date: 2019  Narrative: CHEST INDICATION:   SOB  COMPARISON:  X-ray 08 EXAM PERFORMED/VIEWS:  XR CHEST PORTABLE FINDINGS:  Tubing projects over the right neck base and mediastinum, is external  Enlarged cardiomediastinal silhouette  The lungs are clear  No pneumothorax or pleural effusion  Osseous structures appear within normal limits for patient age  Impression: Enlarged cardiomediastinal silhouette  Pericardial effusion versus cardiomegaly  The study was marked in [facility] for immediate notification  Workstation performed: [id number]        EKG / Monitor: Personally reviewed  Sinus, frequent PVCs, short runs of nonsustained VT    Cardiac testing:   Results for orders placed during the hospital encounter of 01/10/19   Echo complete with contrast if indicated    Narrative [address]  [address] [facility] [address]  [phone]    Transthoracic Echocardiogram  2D, M-mode, Doppler, and Color Doppler    Study date:  2019    Patient: Viridiana Fox  MR number: [id number]  Account number: [de-identified]  : 1940  Age: [de-identified] years  Gender: Male  Status: Inpatient  Location: Bedside  Height: 69 in 69 in  Weight: 159 lb 158 6 lb  BP: 127/ 61 mmHg    Indications: Heart Failure, Shortness of Breath x1 week as per patient  Diagnoses: I50 9 - Heart failure, unspecified    Sonographer:  PELON Dela Cruz, RVS  Primary Physician:  Megan Astorga DO  Referring Physician:  Pippa Brian PA-C  Group:  [facility]  Interpreting Physician:  Mercedez Chavez MD    SUMMARY    LEFT VENTRICLE:  Systolic function was mildly to moderately reduced  Ejection fraction was estimated in the range of 40 % to 45 % to be 40 %  There was moderate diffuse hypokinesis  Wall thickness was moderately increased    Features were consistent with a pseudonormal left ventricular filling pattern, with concomitant abnormal relaxation and increased filling pressure (grade 2 diastolic dysfunction)  RIGHT VENTRICLE:  Systolic function was mildly reduced by TAPSE-1 4cm    LEFT ATRIUM:  The atrium was mildly dilated  MITRAL VALVE:  There was trace regurgitation  IVC, HEPATIC VEINS:  The inferior vena cava was mildly dilated  PERICARDIUM:  A moderate, free-flowing pericardial effusion was identified  There was no evidence of hemodynamic compromise  HISTORY: PRIOR HISTORY: CHF, Diabetes, HTN, PVCs, Former Smoker    PROCEDURE: The procedure was performed at the bedside  This was a routine study  The transthoracic approach was used  The study included complete 2D imaging, M-mode, complete spectral Doppler, and color Doppler  The heart rate was 72 bpm,  at the start of the study  Images were obtained from the parasternal, apical, and subcostal acoustic windows  Images were not obtained from the suprasternal notch acoustic windows  Image quality was adequate  LEFT VENTRICLE: Size was normal  Systolic function was mildly to moderately reduced  Ejection fraction was estimated in the range of 40 % to 45 % to be 40 %  There was moderate diffuse hypokinesis  Wall thickness was moderately increased  No evidence of apical thrombus  DOPPLER: Features were consistent with a pseudonormal left ventricular filling pattern, with concomitant abnormal relaxation and increased filling pressure (grade 2 diastolic dysfunction)  RIGHT VENTRICLE: The size was normal  Systolic function was mildly reduced by TAPSE-1 4cm Wall thickness was normal     LEFT ATRIUM: The atrium was mildly dilated  RIGHT ATRIUM: Size was normal     MITRAL VALVE: Valve structure was normal  There was normal leaflet separation  DOPPLER: The transmitral velocity was within the normal range  There was no evidence for stenosis  There was trace regurgitation  AORTIC VALVE: The valve was trileaflet   Leaflets exhibited mildly increased thickness and normal cuspal separation  DOPPLER: Transaortic velocity was within the normal range  There was no evidence for stenosis  There was no significant  regurgitation  TRICUSPID VALVE: The valve structure was normal  There was normal leaflet separation  DOPPLER: The transtricuspid velocity was within the normal range  There was no evidence for stenosis  There was no significant regurgitation  PULMONIC VALVE: Leaflets exhibited normal thickness, no calcification, and normal cuspal separation  DOPPLER: The transpulmonic velocity was within the normal range  There was no significant regurgitation  PERICARDIUM: A moderate, free-flowing pericardial effusion was identified  There was no evidence of hemodynamic compromise  AORTA: The root exhibited normal size  SYSTEMIC VEINS: IVC: The inferior vena cava was mildly dilated  SYSTEM MEASUREMENT TABLES    2D mode  AoR Diam 2D: 3 2 cm  LA Diam (2D): 4 3 cm  LA/Ao (2D): 1 34  FS (2D Teich): 22 9 %  IVSd (2D): 1 41 cm  LVDEV: 137 cm³  LVEDV MOD BP: 178 cm³  LVESV: 74 2 cm³  LVIDd(2D): 5 32 cm  LVISd (2D): 4 1 cm  LVPWd (2D): 1 42 cm  SV (Teich): 62 8 cm³    Apical four chamber  LVEF A4C: 40 %    Apical two chamber  LVEF A2C: 45 %    Unspecified Scan Mode  MV Peak A Mitchell: 883 mm/s  MV Peak E Mitchell   Mean: 1080 mm/s  Max P mm[Hg]  V Max: 2990 mm/s  Vmax: 2920 mm/s  RA Area: 11 8 cm squared  RA Volume: 25 6 cm³  TAPSE: 1 4 cm    [facility] Accredited Echocardiography Laboratory    Prepared and electronically signed by    Delio Pimentel MD  Signed 2019 18:14:27       Results for orders placed during the hospital encounter of 01/10/19   NM Myocardial Perfusion Spect (Exercise Induced Stress and/or Rest)    Narrative [address]  [address] [facility] [address] [phone]    Rest/Stress Gated SPECT Myocardial Perfusion Imaging After Regadenoson    Patient: Francis Smith  MR number: [id number]  Account number: [id number]  : 1940  Age: [de-identified] years  Gender: Male  Status: Inpatient  Location: Stress lab  Height: 69 in  Weight: 166 lb  BP: 187/ 80 mmHg    Allergies: SULFA ANTIBIOTICS, SULFUR    Diagnosis: R06 02 - Shortness of breath    Primary Physician:  Mila Tee DO  Technician:  Dejuan Currie  RN:  VLADIMIR Brumfield  Group:  [facility]ec[facility] Alcantar  Report Prepared By[de-identified]  VLADIMIR Brumfield  Interpreting Physician:  Madiha Messer MD    INDICATIONS: Evaluation for coronary artery disease  HISTORY: The patient is a [de-identified]year old  male  Chest pain status: no chest pain  Other symptoms: dyspnea and edema  Coronary artery disease risk factors: hypertension, family history of premature coronary artery disease, and  diabetes mellitus  Cardiovascular history: pericardial effusion  PHYSICAL EXAM: Baseline physical exam screening: no wheezes audible  REST ECG: Normal sinus rhythm  The ECG showed occasional premature ventricular contractions  PROCEDURE: The study was performed in the the Stress lab  A regadenoson infusion pharmacologic stress test was performed  Gated SPECT myocardial perfusion imaging was performed after stress and at rest  Systolic blood pressure was 187  mmHg, at the start of the study  Diastolic blood pressure was 80 mmHg, at the start of the study  The heart rate was 73 bpm, at the start of the study  IV double checked  Regadenoson protocol:  Time HR bpm SBP mmHg DBP mmHg Symptoms ST change Rhythm/conduct  Baseline 10:40 72 187 80 none none NSR, frequent PVC's  Immediate 10:48 77 188 72 dizziness -- same as above  1 min 10:49 81 172 72 same as above -- ventricular bigeminy  2 min 10:50 76 151 68 subsiding -- --  3 min 10:51 77 130 70 none -- ventricular bigeminy  4 min 10:52 75 140 70 none -- --  No medications or fluids given  STRESS SUMMARY: Duration of pharmacologic stress was 3 min   Maximal heart rate during stress was 87 bpm  The heart rate response to stress was normal  There was resting hypertension with an appropriate blood pressure response to stress  The rate-pressure product for the peak heart rate and blood pressure was 42291  There was no chest pain during stress  The stress test was terminated due to protocol completion  Pre oxygen saturation: 98 %  Peak oxygen saturation: 98 %  Arrhythmia during stress: isolated premature ventricular beats  ISOTOPE ADMINISTRATION:  Resting isotope administration Stress isotope administration  Agent Tetrofosmin Tetrofosmin  Dose 10 8 mCi 32 6 mCi  Date 01/14/2019 01/14/2019  Injection time 08:30 10:50    The radiopharmaceutical was injected at the peak effect of pharmacologic stress  MYOCARDIAL PERFUSION IMAGING:  The image quality was fair  The left ventricle was mildly dilated  The TID ratio was   9  PERFUSION DEFECTS:  -  There was a moderate-sized, moderately severe, fixed myocardial perfusion defect of the entire inferolateral wall  GATED SPECT:  The calculated left ventricular ejection fraction was 37 %  Left ventricular ejection fraction was moderately decreased by visual estimate  There was moderately reduced myocardial thickening and motion of the lateral wall of the left  ventricle  SUMMARY:  -  Stress results: There was resting hypertension with an appropriate blood pressure response to stress  There was no chest pain during stress  -  Perfusion imaging: There was a moderate-sized, moderately severe, fixed myocardial perfusion defect of the entire inferolateral wall  -  Gated SPECT: The calculated left ventricular ejection fraction was 37 %  Left ventricular ejection fraction was moderately decreased by visual estimate  There was moderately reduced myocardial thickening and motion of the lateral wall  of the left ventricle  -  Impressions and recommendations: Abnormal study after pharmacologic vasodilation with fixed inferolateral wall defect, no clear ischemia  EF 37%   Can not rule out old inferolateral wall infarction  IMPRESSIONS: Abnormal study after pharmacologic vasodilation with fixed inferolateral wall defect, no clear ischemia  EF 37%  Can not rule out old inferolateral wall infarction  There was a moderate-sized infarct  Left ventricular systolic  function was reduced, with regional wall motion abnormalities  Prepared and signed by    Jaskaran Matson MD  Signed 01/14/2019 17:13:54         Ann Marie        "This note has been constructed using a voice recognition system  Therefore there may be syntax, spelling, and/or grammatical errors   Please call if you have any questions  "

## 2019-01-15 NOTE — PROGRESS NOTES
[facility] Internal Medicine Progress Note  Patient: Kenyatta Enciso [de-identified] y o  male   MRN: [id number]  PCP: Megan Astorga DO  Unit/Bed#: [address] [place] [address] Encounter: [id number]  Date Of Visit: 01/15/19    Problem List:    Principal Problem:    Acute respiratory failure with hypoxia ([facility] [address] )  Active Problems:    Acute combined systolic and diastolic congestive heart failure (HCC)    PVC (premature ventricular contraction)    NSTEMI (non-ST elevated myocardial infarction) ([facility] [address] )    Pericardial effusion    Essential hypertension    Stage 3 chronic kidney disease ([facility] [address] )    Controlled type 2 diabetes mellitus, without long-term current use of insulin (HCC)      Assessment & Plan:    * Acute respiratory failure with hypoxia ([facility] [address] )   Assessment & Plan    Likely due to CHF exacerbation  Required BiPAP support initially and was in the ICU initially  Now off supplemental oxygen by nasal cannula and stable on room air     Acute combined systolic and diastolic congestive heart failure ([facility] [address] )   [address] Cardiology input  Continue with 20 mg of IV Lasix today and changed to p o  Tomorrow  Continue Coreg     Pericardial effusion   Assessment & Plan    With no evidence of tamponade  Echo showed EF of 40% with grade 2 diastolic dysfunction  Moderate pericardial effusion was noted  Continue call to see  Repeat echo on 01/14 showed EF of 40% with small to moderate pericardial effusion     NSTEMI (non-ST elevated myocardial infarction) [facility])   Assessment & Plan    Possibly NSTEMI type 2   Stress test was abnormal with fixed inferolateral wall defect but no clear ischemia  EF was noted to be 37%  Moderate-sized infarct noted  Continue aspirin, Coreg, Imdur, statin  Patient will follow up with Cardiology after discharge     PVC (premature ventricular contraction)   Assessment & Plan    Patient with frequent PVCs and NSVT on telemetry  Dose of Coreg was increased but continues to have bigeminy and NSVT    Present in room with Cardiology and patient was advised that due to his low EF, ventricular ectopy, fixed inferolateral wall defect, he needs to be discharged on a LifeVest   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 [address]   Option of getting a diagnostic cardiac catheterization to rule out obstructive CAD was also discussed with patient  Patient understands that with his chronic kidney disease, contrast would put him at risk for worsening renal function  Informed patient that if plan was to go through cardiac catheterization, will consult Nephrology for optimization prior to procedure  Patient stated that he would talk to his son today before making any decisions     Controlled type 2 diabetes mellitus, without long-term current use of insulin [facility])   Assessment & Plan    Lab Results   Component Value Date    HGBA1C 7 4 (H) 01/10/2019       Recent Labs      01/14/19   1629  01/14/19   2154  01/15/19   0729  01/15/19   1145   POCGLU  312*  201*  224*  320*     Holding metformin at this time  Sugars are not well controlled  Increase to algorithm 3 sliding scale insulin  Stage 3 chronic kidney disease ([address] )   Assessment & Plan    there is no baseline available for comparison but patient's creatinine has remained stable in the 2s range which is likely his baseline  Repeat lab work in a m  While on diuretics  Essential hypertension   Assessment & Plan    Blood pressure was initially elevated in the ER  Continue Coreg, Norvasc, Imdur           VTE Pharmacologic Prophylaxis:   Pharmacologic: Heparin  Mechanical VTE Prophylaxis in Place: Yes    Patient Centered Rounds: I have performed bedside rounds with nursing staff today  Discussions with Specialists or Other Care Team Provider: Yes    Education and Discussions with Family / Patient:Yes    Time Spent for Care: 40 min    More than 50% of total time spent on counseling and coordination of care as described above     Current Length of Stay: 5 day(s)    Current Patient Status: Inpatient     Discharge Plan: home    Code Status: Level 1 - Full Code    Certification Statement: The patient will continue to require additional inpatient hospital stay due to CHF exacerbation, frequent PVCs, NSVT      Subjective:   States breathing has improved  Patient with NSVT and bigeminy on tele    Objective:     Vitals:   Temp (24hrs), Av 5 °F (36 4 °C), Min:97 4 °F (36 3 °C), Max:97 6 °F (36 4 °C)    Temp:  [97 4 °F (36 3 °C)-97 6 °F (36 4 °C)] 97 6 °F (36 4 °C)  HR:  [55-74] 55  Resp:  [18-20] 20  BP: (116-160)/(56-66) 116/56  SpO2:  [98 %-100 %] 99 %  Body mass index is 23 73 kg/m²  Input and Output Summary (last 24 hours): Intake/Output Summary (Last 24 hours) at 01/15/19 1532  Last data filed at 01/15/19 1201   Gross per 24 hour   Intake              260 ml   Output             1950 ml   Net            -1690 ml       Physical Exam:     Physical Exam   Constitutional: He is oriented to person, place, and time  He appears well-developed and well-nourished  No distress  HENT:   Head: Normocephalic and atraumatic  Mouth/Throat: Oropharynx is clear and moist    Eyes: Conjunctivae are normal  Right eye exhibits no discharge  Left eye exhibits no discharge  Neck: Neck supple  Cardiovascular:   Murmur heard  Irregular   Pulmonary/Chest: Effort normal and breath sounds normal  No respiratory distress  He has no wheezes  He has no rales  Abdominal: Soft  Bowel sounds are normal  He exhibits no distension  There is no tenderness  Musculoskeletal: He exhibits edema (improving)  Neurological: He is alert and oriented to person, place, and time  No cranial nerve deficit  Skin: Skin is dry  He is not diaphoretic  Psychiatric: He has a normal mood and affect         Additional Data:     Labs:      Results from last 7 days  Lab Units 01/15/19  0608 19  0555 19  0450   WBC Thousand/uL  --  5 59 5 91 HEMOGLOBIN g/dL  --  10 6* 10 6*   HEMATOCRIT %  --  32 9* 32 3*   PLATELETS Thousands/uL 146* 138* 123*   NEUTROS PCT %  --   --  65   LYMPHS PCT %  --   --  19   MONOS PCT %  --   --  15*   EOS PCT %  --   --  1       Results from last 7 days  Lab Units 01/15/19  0608  01/11/19  0450   POTASSIUM mmol/L 4 7  < > 3 9   CHLORIDE mmol/L 99*  < > 104   CO2 mmol/L 29  < > 26   BUN mg/dL 62*  < > 56*   CREATININE mg/dL 2 34*  < > 2 30*   CALCIUM mg/dL 8 4  < > 8 3   ALK PHOS U/L  --   --  105   ALT U/L  --   --  24   AST U/L  --   --  17   < > = values in this interval not displayed  Results from last 7 days  Lab Units 01/11/19  0450   INR  1 10       * I Have Reviewed All Lab Data Listed Above  * Additional Pertinent Lab Tests Reviewed: All Labs For Current Hospital Admission Reviewed    Imaging:  Xr Chest 1 View Portable    Result Date: 1/11/2019  Narrative: CHEST INDICATION:   SOB  COMPARISON:  X-ray 8/11/08 EXAM PERFORMED/VIEWS:  XR CHEST PORTABLE FINDINGS:  Tubing projects over the right neck base and mediastinum, is external  Enlarged cardiomediastinal silhouette  The lungs are clear  No pneumothorax or pleural effusion  Osseous structures appear within normal limits for patient age  Impression: Enlarged cardiomediastinal silhouette  Pericardial effusion versus cardiomegaly  The study was marked in [facility] for immediate notification   Workstation performed: [id number]     Imaging Reports Reviewed by myself    Cultures:   Blood Culture: No results found for: BLOODCX  Urine Culture: No results found for: URINECX  Sputum Culture: No components found for: SPUTUMCX  Wound Culture: No results found for: WOUNDCULT    Last 24 Hours Medication List:     Current Facility-Administered Medications:  amLODIPine 5 mg Oral Daily Vivien Oliver MD   aspirin 162 mg Oral Daily Vivien Oliver MD   atorvastatin 40 mg Oral Daily With SUSANA Peck   carvedilol 6 25 mg Oral BID With Meals Vivien Oliver MD clobetasol  Topical Daily PRN Tk Cerna PA-C   colchicine 0 3 mg Oral Daily SUSANA Keith   docusate sodium 100 mg Oral BID Phoebe Revankar, DO   furosemide 20 mg Intravenous Once Milta Grout, CRNP   [START ON 1/16/2019] furosemide 40 mg Oral Daily Milta Grout, CRNP   heparin (porcine) 5,000 Units Subcutaneous [facility] Tk Proper, PA-C   insulin lispro 1-6 Units Subcutaneous TID AC Phoebe Revankar, DO   insulin lispro 1-6 Units Subcutaneous HS Phoebe Revankar, DO   isosorbide mononitrate 30 mg Oral Daily Milta Grout, CRNP   latanoprost 1 drop Both Eyes HS Tk Cerna PA-C   polyethylene glycol 17 g Oral Daily PRN Phoebe Revankar, DO   timolol 1 drop Both Eyes BID Tk Cerna PA-C        Today, Patient Was Seen By: Sammi Faith DO    ** Please Note: "This note has been constructed using a voice recognition system  Therefore there may be syntax, spelling, and/or grammatical errors   Please call if you have any questions  "**

## 2019-01-15 NOTE — ASSESSMENT & PLAN NOTE
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

## 2019-01-15 NOTE — PLAN OF CARE
CARDIOVASCULAR - ADULT     Maintains optimal cardiac output and hemodynamic stability Progressing     Absence of cardiac dysrhythmias or at baseline rhythm Progressing        DISCHARGE PLANNING - CARE MANAGEMENT     Discharge to post-acute care or home with appropriate resources Progressing        Knowledge Deficit     Patient/family/caregiver demonstrates understanding of disease process, treatment plan, medications, and discharge instructions Progressing        METABOLIC, FLUID AND ELECTROLYTES - ADULT     Electrolytes maintained within normal limits Progressing     Fluid balance maintained Progressing     Glucose maintained within target range Progressing        Nutrition/Hydration-ADULT     Nutrient/Hydration intake appropriate for improving, restoring or maintaining nutritional needs Progressing        Potential for Falls     Patient will remain free of falls Progressing        RESPIRATORY - ADULT     Achieves optimal ventilation and oxygenation Progressing        SAFETY ADULT     Patient will remain free of falls Progressing     Maintain or return to baseline ADL function Progressing     Maintain or return mobility status to optimal level Progressing

## 2019-01-16 ENCOUNTER — HOSPITAL ENCOUNTER (OUTPATIENT)
Dept: NON INVASIVE DIAGNOSTICS | Facility: HOSPITAL | Age: 79
Discharge: HOME | End: 2019-01-16
Attending: INTERNAL MEDICINE

## 2019-01-16 VITALS
OXYGEN SATURATION: 99 % | BODY MASS INDEX: 23.67 KG/M2 | HEART RATE: 54 BPM | HEIGHT: 69 IN | SYSTOLIC BLOOD PRESSURE: 142 MMHG | DIASTOLIC BLOOD PRESSURE: 64 MMHG | WEIGHT: 159.83 LBS | TEMPERATURE: 98 F | RESPIRATION RATE: 18 BRPM

## 2019-01-16 PROBLEM — I50.41 ACUTE COMBINED SYSTOLIC AND DIASTOLIC CONGESTIVE HEART FAILURE (HCC): Status: RESOLVED | Noted: 2019-01-10 | Resolved: 2019-01-16

## 2019-01-16 PROBLEM — J96.01 ACUTE RESPIRATORY FAILURE WITH HYPOXIA (HCC): Status: RESOLVED | Noted: 2019-01-10 | Resolved: 2019-01-16

## 2019-01-16 PROBLEM — I21.4 NSTEMI (NON-ST ELEVATED MYOCARDIAL INFARCTION) (HCC): Status: RESOLVED | Noted: 2019-01-10 | Resolved: 2019-01-16

## 2019-01-16 LAB
ANION GAP SERPL CALCULATED.3IONS-SCNC: 8 MMOL/L (ref 4–13)
BUN SERPL-MCNC: 63 MG/DL (ref 5–25)
CALCIUM SERPL-MCNC: 8.4 MG/DL (ref 8.3–10.1)
CHLORIDE SERPL-SCNC: 99 MMOL/L (ref 100–108)
CO2 SERPL-SCNC: 28 MMOL/L (ref 21–32)
CREAT SERPL-MCNC: 2.36 MG/DL (ref 0.6–1.3)
GFR SERPL CREATININE-BSD FRML MDRD: 25 ML/MIN/1.73SQ M
GLUCOSE SERPL-MCNC: 191 MG/DL (ref 65–140)
GLUCOSE SERPL-MCNC: 192 MG/DL (ref 65–140)
GLUCOSE SERPL-MCNC: 263 MG/DL (ref 65–140)
PLATELET # BLD AUTO: 150 THOUSANDS/UL (ref 149–390)
PMV BLD AUTO: 9.9 FL (ref 8.9–12.7)
POTASSIUM SERPL-SCNC: 4.3 MMOL/L (ref 3.5–5.3)
SODIUM SERPL-SCNC: 135 MMOL/L (ref 136–145)

## 2019-01-16 PROCEDURE — 99239 HOSP IP/OBS DSCHRG MGMT >30: CPT | Performed by: FAMILY MEDICINE

## 2019-01-16 PROCEDURE — 85049 AUTOMATED PLATELET COUNT: CPT | Performed by: FAMILY MEDICINE

## 2019-01-16 PROCEDURE — 80048 BASIC METABOLIC PNL TOTAL CA: CPT | Performed by: FAMILY MEDICINE

## 2019-01-16 PROCEDURE — 94760 N-INVAS EAR/PLS OXIMETRY 1: CPT

## 2019-01-16 PROCEDURE — 82948 REAGENT STRIP/BLOOD GLUCOSE: CPT

## 2019-01-16 PROCEDURE — 99232 SBSQ HOSP IP/OBS MODERATE 35: CPT | Performed by: INTERNAL MEDICINE

## 2019-01-16 RX ORDER — ISOSORBIDE MONONITRATE 30 MG/1
30 TABLET, EXTENDED RELEASE ORAL DAILY
Qty: 30 TABLET | Refills: 0 | Status: SHIPPED | OUTPATIENT
Start: 2019-01-17

## 2019-01-16 RX ORDER — ATORVASTATIN CALCIUM 40 MG/1
40 TABLET, FILM COATED ORAL
Qty: 30 TABLET | Refills: 0 | Status: SHIPPED | OUTPATIENT
Start: 2019-01-16

## 2019-01-16 RX ORDER — ASPIRIN 81 MG/1
162 TABLET ORAL DAILY
Qty: 30 TABLET | Refills: 0 | Status: SHIPPED | OUTPATIENT
Start: 2019-01-17 | End: 2019-04-02

## 2019-01-16 RX ORDER — CARVEDILOL 6.25 MG/1
6.25 TABLET ORAL 2 TIMES DAILY WITH MEALS
Qty: 60 TABLET | Refills: 0 | Status: SHIPPED | OUTPATIENT
Start: 2019-01-16

## 2019-01-16 RX ORDER — TIMOLOL MALEATE 5 MG/ML
1 SOLUTION/ DROPS OPHTHALMIC 2 TIMES DAILY
Refills: 0 | Status: ON HOLD
Start: 2019-01-16 | End: 2021-01-07 | Stop reason: SDUPTHER

## 2019-01-16 RX ORDER — AMLODIPINE BESYLATE 5 MG/1
5 TABLET ORAL DAILY
Qty: 30 TABLET | Refills: 0 | Status: SHIPPED | OUTPATIENT
Start: 2019-01-17 | End: 2020-03-06

## 2019-01-16 RX ADMIN — FUROSEMIDE 40 MG: 40 TABLET ORAL at 08:42

## 2019-01-16 RX ADMIN — DOCUSATE SODIUM 100 MG: 100 CAPSULE, LIQUID FILLED ORAL at 08:43

## 2019-01-16 RX ADMIN — INSULIN LISPRO 2 UNITS: 100 INJECTION, SOLUTION INTRAVENOUS; SUBCUTANEOUS at 08:40

## 2019-01-16 RX ADMIN — HEPARIN SODIUM 5000 UNITS: 5000 INJECTION, SOLUTION INTRAVENOUS; SUBCUTANEOUS at 06:10

## 2019-01-16 RX ADMIN — TIMOLOL MALEATE 1 DROP: 5 SOLUTION/ DROPS OPHTHALMIC at 08:42

## 2019-01-16 RX ADMIN — ISOSORBIDE MONONITRATE 30 MG: 30 TABLET, EXTENDED RELEASE ORAL at 08:42

## 2019-01-16 RX ADMIN — INSULIN LISPRO 3 UNITS: 100 INJECTION, SOLUTION INTRAVENOUS; SUBCUTANEOUS at 11:57

## 2019-01-16 RX ADMIN — COLCHICINE 0.3 MG: 0.6 TABLET, FILM COATED ORAL at 08:43

## 2019-01-16 RX ADMIN — ASPIRIN 162 MG: 81 TABLET, COATED ORAL at 08:43

## 2019-01-16 RX ADMIN — CARVEDILOL 6.25 MG: 6.25 TABLET, FILM COATED ORAL at 08:43

## 2019-01-16 RX ADMIN — AMLODIPINE BESYLATE 5 MG: 5 TABLET ORAL at 08:42

## 2019-01-16 RX ADMIN — HEPARIN SODIUM 5000 UNITS: 5000 INJECTION, SOLUTION INTRAVENOUS; SUBCUTANEOUS at 13:56

## 2019-01-16 NOTE — PLAN OF CARE
CARDIOVASCULAR - ADULT     Maintains optimal cardiac output and hemodynamic stability Completed     Absence of cardiac dysrhythmias or at baseline rhythm Completed        DISCHARGE PLANNING - CARE MANAGEMENT     Discharge to post-acute care or home with appropriate resources Completed        Knowledge Deficit     Patient/family/caregiver demonstrates understanding of disease process, treatment plan, medications, and discharge instructions Completed        METABOLIC, FLUID AND ELECTROLYTES - ADULT     Electrolytes maintained within normal limits Completed     Fluid balance maintained Completed     Glucose maintained within target range Completed        Nutrition/Hydration-ADULT     Nutrient/Hydration intake appropriate for improving, restoring or maintaining nutritional needs Completed        Potential for Falls     Patient will remain free of falls Completed        RESPIRATORY - ADULT     Achieves optimal ventilation and oxygenation Completed        SAFETY ADULT     Patient will remain free of falls Completed     Maintain or return to baseline ADL function Completed     Maintain or return mobility status to optimal level Completed

## 2019-01-16 NOTE — PROGRESS NOTES
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  Nuclear stress test shows no clear ischemia but ejection fraction was noted to be around 37%  2  Small to moderate pericardial effusion  Not worse on limited echo in fact may be little better  3  PVCs  Patient continued to have PVCs along with some episode of triplet and previous an episode of NSVT  He is asymptomatic  His EF is around 40%  He does not meet the criteria to have defibrillator vest but he may need to follow up with EP  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 and possibly EP    4  Diabetes mellitus  HbA1c 7 4 management as per primary team    5  Dyslipidemia  Continue statins    6  CKD stage 3/4  Baseline not non  Creatinine stable around 2 5-2 6     7  Essential hypertension  Blood pressure is pretty well controlled now     8  Anemia of chronic disease  Hemoglobin around 10 6    Plan  Continue carvedilol  Continue amlodipine  Continue Lasix  Follow-up electrolytes in 1-2 weeks  Follow up with Cardiology is 1-2 weeks  Okay to discharge from cardiac point of view on current therapy  Subjective / Objective:   Patient seen and evaluated  No new complaints  Heart rate around 60 beats per minute  Still have few PVCs and some triplets  No sustained arrhythmias  Patient is totally asymptomatic  Breathing has significantly improved  No nausea no vomiting no chest pain  Son was present during my discussions  There reluctant for invasive cardiac workup at this time  Advised him not to drive until seen by their cardiologist in office      Vitals: Blood pressure 142/64, pulse (!) 54, temperature 98 °F (36 7 °C), temperature source Oral, resp  rate 18, height 5' 9" (1 753 m), weight 72 5 kg (159 lb 13 3 oz), SpO2 99 %  Vitals:    01/15/19 0600 01/16/19 0600   Weight: 72 9 kg (160 lb 11 5 oz) 72 5 kg (159 lb 13 3 oz)     Body mass index is 23 6 kg/m²  BP Readings from Last 3 Encounters:   01/16/19 142/64   12/19/18 (!) 172/82   05/23/18 152/57     Orthostatic Blood Pressures      Most Recent Value   Blood Pressure  142/64 filed at 01/16/2019 1249   Patient Position - Orthostatic VS  Sitting filed at 01/16/2019 1249        I/O       01/14 0701 - 01/15 0700 01/15 0701 - 01/16 0700 01/16 0701 - 01/17 0700    P  O   260     Total Intake(mL/kg)  260 (3 6)     Urine (mL/kg/hr) 2275 (1 3) 1550 (0 9) 525 (1)    Total Output 2275 1550 525    Net -2275 -1290 -525               Invasive Devices     Peripheral Intravenous Line            Peripheral IV 01/14/19 Right Forearm 1 day                  Intake/Output Summary (Last 24 hours) at 01/16/19 1432  Last data filed at 01/16/19 1401   Gross per 24 hour   Intake                0 ml   Output             1725 ml   Net            -1725 ml         Physical Exam:   Physical Exam    Neurologic:  Alert & oriented x 3,  no focal deficits noted   Constitutional:  Well developed, well nourished,  With no acute distress  Eyes:  PERRL, conjunctiva normal   HENT:  Atraumatic, external ears normal, nose normal,   NECK: Normal range of motion, no tenderness, neck is supple , No JVP  Respiratory:  Bilateral air entry mostly clear to auscultation  Cardiovascular: S1-S2 regularly irregular with a 2/6 ejection systolic murmur  S4 is heard  GI:  Soft, nondistended, normal bowel sounds, nontender, no hepatosplenomegaly appreciated  Musculoskeletal:  No tenderness, no deformities      Extremities:  No edema  Psychiatric:  Speech and behavior appropriate             Medications/ Allergies:       Current Facility-Administered Medications:  amLODIPine 5 mg Oral Daily Narpinder Yvonne Worrell MD   aspirin 162 mg Oral Daily Génesis Polo MD   atorvastatin 40 mg Oral Daily With Chalmer MatesSUSANA   carvedilol 6 25 mg Oral BID With Meals Génesis Polo MD   clobetasol  Topical Daily PRN Lamona Oneco, PA-C   docusate sodium 100 mg Oral BID Phoebe Revankar, DO   furosemide 40 mg Oral Daily SUSANA Walden   heparin (porcine) 5,000 Units Subcutaneous [facility] Lamona Oneco, PA-C   insulin lispro 1-6 Units Subcutaneous TID AC Phoebe Revankar, DO   insulin lispro 1-6 Units Subcutaneous HS Phoebe Revankar, DO   isosorbide mononitrate 30 mg Oral Daily SUSANA Walden   latanoprost 1 drop Both Eyes HS Lamona Oneco, PA-C   polyethylene glycol 17 g Oral Daily PRN Phoebe Revankar, DO   timolol 1 drop Both Eyes BID Lamona Oneco, PA-C       clobetasol  Daily PRN   polyethylene glycol 17 g Daily PRN     Allergies   Allergen Reactions    Sulfa Antibiotics     Sulfur        VTE Pharmacologic Prophylaxis:    Heparin    Labs:   Troponins:    Results from last 7 days  Lab Units 01/11/19  0450 01/10/19  2353 01/10/19  2058   TROPONIN I ng/mL 0 10* 0 12* 0 11*       CBC with diff:    Results from last 7 days  Lab Units 01/16/19  0555 01/15/19  0608 01/14/19  0555 01/11/19  0450 01/10/19  1731   WBC Thousand/uL  --   --  5 59 5 91 7 24   HEMOGLOBIN g/dL  --   --  10 6* 10 6* 11 6*   HEMATOCRIT %  --   --  32 9* 32 3* 37 0   MCV fL  --   --  100* 100* 103*   PLATELETS Thousands/uL 150 146* 138* 123* 157   MCH pg  --   --  32 3 32 9 32 4   MCHC g/dL  --   --  32 2 32 8 31 4   RDW %  --   --  13 6 14 5 14 2   MPV fL 9 9 10 2 10 2 9 6 9 7   NRBC AUTO /100 WBCs  --   --   --   --  0       CMP:    Results from last 7 days  Lab Units 01/16/19  0555 01/15/19  0608 01/14/19  0555 01/13/19  1042 01/12/19  0535 01/11/19  2222 01/11/19  0450 01/10/19  1731   SODIUM mmol/L 135* 134* 132* 130* 137 137 139 139   POTASSIUM mmol/L 4 3 4 7 4 3 4 8 4 3 4 8 3 9 4 8   CHLORIDE mmol/L 99* 99* 97* 95* 102 101 104 102   CO2 mmol/L 28 29 28 28 26 27 26 26   ANION GAP mmol/L 8 6 7 7 9 9 9 11   BUN mg/dL 63* 62* 60* 57* 53* 57* 56* 56*   CREATININE mg/dL 2 36* 2 34* 2 21* 2 36* 2 29* 2 41* 2 30* 2 43*   CALCIUM mg/dL 8 4 8 4 8 3 8 3 8 5 8 6 8 3 8 6   AST U/L  --   --   --   --   --   --  17 21   ALT U/L  --   --   --   --   --   --  24 26   ALK PHOS U/L  --   --   --   --   --   --  105 125*   TOTAL PROTEIN g/dL  --   --   --   --   --   --  5 8* 6 4   ALBUMIN g/dL  --   --   --   --   --   --  3 1* 3 5   TOTAL BILIRUBIN mg/dL  --   --   --   --   --   --  0 80 0 80   EGFR ml/min/1 73sq m 25 25 27 25 26 24 26 24       Magnesium:    Results from last 7 days  Lab Units 01/12/19  0535 01/11/19  2222 01/11/19  0450   MAGNESIUM mg/dL 2 3 2 1 1 6     Coags:    Results from last 7 days  Lab Units 01/11/19  0450 01/10/19  1731   PTT seconds  --  31   INR  1 10 1 09     TSH:    Results from last 7 days  Lab Units 01/11/19  1001   TSH 3RD GENERATON uIU/mL 1 816     Lipid Profile:    Results from last 7 days  Lab Units 01/15/19  0608   CHOLESTEROL mg/dL 102   TRIGLYCERIDES mg/dL 70   HDL mg/dL 34*   LDL CALC mg/dL 54     Hgb A1c:    Results from last 7 days  Lab Units 01/10/19  2058   HEMOGLOBIN A1C % 7 4*         Imaging & Testing   I have personally reviewed pertinent reports  Xr Chest 1 View Portable    Result Date: 1/11/2019  Narrative: CHEST INDICATION:   SOB  COMPARISON:  X-ray 8/11/08 EXAM PERFORMED/VIEWS:  XR CHEST PORTABLE FINDINGS:  Tubing projects over the right neck base and mediastinum, is external  Enlarged cardiomediastinal silhouette  The lungs are clear  No pneumothorax or pleural effusion  Osseous structures appear within normal limits for patient age  Impression: Enlarged cardiomediastinal silhouette  Pericardial effusion versus cardiomegaly  The study was marked in [facility] for immediate notification  Workstation performed: [id number]        EKG / Monitor: Personally reviewed  Monitor shows normal sinus rhythm few PVCs  There has been triplet who noted  No sustained arrhythmia patient was totally asymptomatic  Cardiac testing:   Results for orders placed during the hospital encounter of 01/10/19   Echo complete with contrast if indicated    Narrative [address]  [address] [facility]  [place] [address]  [phone]    Transthoracic Echocardiogram  2D, M-mode, Doppler, and Color Doppler    Study date:  2019    Patient: Gloria Juarez  MR number: [id number]  Account number: [de-identified]  : 1940  Age: [de-identified] years  Gender: Male  Status: Inpatient  Location: Bedside  Height: 69 in 69 in  Weight: 159 lb 158 6 lb  BP: 127/ 61 mmHg    Indications: Heart Failure, Shortness of Breath x1 week as per patient  Diagnoses: I50 9 - Heart failure, unspecified    Sonographer:  PELON Garcia, RVS  Primary Physician:  Sunni Brown DO  Referring Physician:  Julio C Fountain PA-C  Group:  [facility]  Interpreting Physician:  Memo Schultz MD    SUMMARY    LEFT VENTRICLE:  Systolic function was mildly to moderately reduced  Ejection fraction was estimated in the range of 40 % to 45 % to be 40 %  There was moderate diffuse hypokinesis  Wall thickness was moderately increased  Features were consistent with a pseudonormal left ventricular filling pattern, with concomitant abnormal relaxation and increased filling pressure (grade 2 diastolic dysfunction)  RIGHT VENTRICLE:  Systolic function was mildly reduced by TAPSE-1 4cm    LEFT ATRIUM:  The atrium was mildly dilated  MITRAL VALVE:  There was trace regurgitation  IVC, HEPATIC VEINS:  The inferior vena cava was mildly dilated  PERICARDIUM:  A moderate, free-flowing pericardial effusion was identified  There was no evidence of hemodynamic compromise  HISTORY: PRIOR HISTORY: CHF, Diabetes, HTN, PVCs, Former Smoker    PROCEDURE: The procedure was performed at the bedside  This was a routine study   The transthoracic approach was used  The study included complete 2D imaging, M-mode, complete spectral Doppler, and color Doppler  The heart rate was 72 bpm,  at the start of the study  Images were obtained from the parasternal, apical, and subcostal acoustic windows  Images were not obtained from the suprasternal notch acoustic windows  Image quality was adequate  LEFT VENTRICLE: Size was normal  Systolic function was mildly to moderately reduced  Ejection fraction was estimated in the range of 40 % to 45 % to be 40 %  There was moderate diffuse hypokinesis  Wall thickness was moderately increased  No evidence of apical thrombus  DOPPLER: Features were consistent with a pseudonormal left ventricular filling pattern, with concomitant abnormal relaxation and increased filling pressure (grade 2 diastolic dysfunction)  RIGHT VENTRICLE: The size was normal  Systolic function was mildly reduced by TAPSE-1 4cm Wall thickness was normal     LEFT ATRIUM: The atrium was mildly dilated  RIGHT ATRIUM: Size was normal     MITRAL VALVE: Valve structure was normal  There was normal leaflet separation  DOPPLER: The transmitral velocity was within the normal range  There was no evidence for stenosis  There was trace regurgitation  AORTIC VALVE: The valve was trileaflet  Leaflets exhibited mildly increased thickness and normal cuspal separation  DOPPLER: Transaortic velocity was within the normal range  There was no evidence for stenosis  There was no significant  regurgitation  TRICUSPID VALVE: The valve structure was normal  There was normal leaflet separation  DOPPLER: The transtricuspid velocity was within the normal range  There was no evidence for stenosis  There was no significant regurgitation  PULMONIC VALVE: Leaflets exhibited normal thickness, no calcification, and normal cuspal separation  DOPPLER: The transpulmonic velocity was within the normal range  There was no significant regurgitation      PERICARDIUM: A moderate, free-flowing pericardial effusion was identified  There was no evidence of hemodynamic compromise  AORTA: The root exhibited normal size  SYSTEMIC VEINS: IVC: The inferior vena cava was mildly dilated  SYSTEM MEASUREMENT TABLES    2D mode  AoR Diam 2D: 3 2 cm  LA Diam (2D): 4 3 cm  LA/Ao (2D): 1 34  FS (2D Teich): 22 9 %  IVSd (2D): 1 41 cm  LVDEV: 137 cm³  LVEDV MOD BP: 178 cm³  LVESV: 74 2 cm³  LVIDd(2D): 5 32 cm  LVISd (2D): 4 1 cm  LVPWd (2D): 1 42 cm  SV (Teich): 62 8 cm³    Apical four chamber  LVEF A4C: 40 %    Apical two chamber  LVEF A2C: 45 %    Unspecified Scan Mode  MV Peak A Mitchell: 883 mm/s  MV Peak E Mitchell  Mean: 1080 mm/s  Max P mm[Hg]  V Max: 2990 mm/s  Vmax: 2920 mm/s  RA Area: 11 8 cm squared  RA Volume: 25 6 cm³  TAPSE: 1 4 cm    [employer] Accredited Echocardiography Laboratory    Prepared and electronically signed by    Azar Gurrola MD  Signed 2019 18:14:27         Results for orders placed during the hospital encounter of 01/10/19   NM Myocardial Perfusion Spect (Exercise Induced Stress and/or Rest)    [facility] [address]  [address] [facility] [address]  [phone]    Rest/Stress Gated SPECT Myocardial Perfusion Imaging After Regadenoson    Patient: Molly Barkley  MR number: [id number]  Account number: [de-identified]  : 1940  Age: [de-identified] years  Gender: Male  Status: Inpatient  Location: Stress lab  Height: 69 in  Weight: 166 lb  BP: 187/ 80 mmHg    Allergies: SULFA ANTIBIOTICS, SULFUR    Diagnosis: R06 02 - Shortness of breath    Primary Physician:  Zaki Partida DO  Technician:  Verena Bowen  RN:  VLADIMIR Uribe  Group:  Yulissa Luciano  Report Prepared By[de-identified]  VLADIMIR Uribe  Interpreting Physician:  Natanael Whitaker MD    INDICATIONS: Evaluation for coronary artery disease  HISTORY: The patient is a [de-identified]year old  male  Chest pain status: no chest pain  Other symptoms: dyspnea and edema   Coronary artery disease risk factors: hypertension, family history of premature coronary artery disease, and  diabetes mellitus  Cardiovascular history: pericardial effusion  PHYSICAL EXAM: Baseline physical exam screening: no wheezes audible  REST ECG: Normal sinus rhythm  The ECG showed occasional premature ventricular contractions  PROCEDURE: The study was performed in the the Stress lab  A regadenoson infusion pharmacologic stress test was performed  Gated SPECT myocardial perfusion imaging was performed after stress and at rest  Systolic blood pressure was 187  mmHg, at the start of the study  Diastolic blood pressure was 80 mmHg, at the start of the study  The heart rate was 73 bpm, at the start of the study  IV double checked  Regadenoson protocol:  Time HR bpm SBP mmHg DBP mmHg Symptoms ST change Rhythm/conduct  Baseline 10:40 72 187 80 none none NSR, frequent PVC's  Immediate 10:48 77 188 72 dizziness -- same as above  1 min 10:49 81 172 72 same as above -- ventricular bigeminy  2 min 10:50 76 151 68 subsiding -- --  3 min 10:51 77 130 70 none -- ventricular bigeminy  4 min 10:52 75 140 70 none -- --  No medications or fluids given  STRESS SUMMARY: Duration of pharmacologic stress was 3 min  Maximal heart rate during stress was 87 bpm  The heart rate response to stress was normal  There was resting hypertension with an appropriate blood pressure response to stress  The rate-pressure product for the peak heart rate and blood pressure was 44164  There was no chest pain during stress  The stress test was terminated due to protocol completion  Pre oxygen saturation: 98 %  Peak oxygen saturation: 98 %  Arrhythmia during stress: isolated premature ventricular beats      ISOTOPE ADMINISTRATION:  Resting isotope administration Stress isotope administration  Agent Tetrofosmin Tetrofosmin  Dose 10 8 mCi 32 6 mCi  Date 01/14/2019 01/14/2019  Injection time 08:30 10:50    The radiopharmaceutical was injected at the peak effect of pharmacologic stress  MYOCARDIAL PERFUSION IMAGING:  The image quality was fair  The left ventricle was mildly dilated  The TID ratio was   9  PERFUSION DEFECTS:  -  There was a moderate-sized, moderately severe, fixed myocardial perfusion defect of the entire inferolateral wall  GATED SPECT:  The calculated left ventricular ejection fraction was 37 %  Left ventricular ejection fraction was moderately decreased by visual estimate  There was moderately reduced myocardial thickening and motion of the lateral wall of the left  ventricle  SUMMARY:  -  Stress results: There was resting hypertension with an appropriate blood pressure response to stress  There was no chest pain during stress  -  Perfusion imaging: There was a moderate-sized, moderately severe, fixed myocardial perfusion defect of the entire inferolateral wall  -  Gated SPECT: The calculated left ventricular ejection fraction was 37 %  Left ventricular ejection fraction was moderately decreased by visual estimate  There was moderately reduced myocardial thickening and motion of the lateral wall  of the left ventricle  -  Impressions and recommendations: Abnormal study after pharmacologic vasodilation with fixed inferolateral wall defect, no clear ischemia  EF 37%  Can not rule out old inferolateral wall infarction  IMPRESSIONS: Abnormal study after pharmacologic vasodilation with fixed inferolateral wall defect, no clear ischemia  EF 37%  Can not rule out old inferolateral wall infarction  There was a moderate-sized infarct  Left ventricular systolic  function was reduced, with regional wall motion abnormalities  Prepared and signed by    Jaskaran Matson MD  Signed 01/14/2019 17:13:54         Dr Jaskaran Matson MD [facility]      "This note has been constructed using a voice recognition system  Therefore there may be syntax, spelling, and/or grammatical errors   Please call if you have any questions  "

## 2019-01-16 NOTE — NURSING NOTE
Patient discharged to home left via wheelchair with his belongings and his son  Iv access removed  Discharge instructions, scripts, and education given to patient and discussed  Patient received pneumo vaccine during stay and previously for flu  Strongly encouraged patient to continue his follow up appointments with cardiology

## 2019-01-16 NOTE — DISCHARGE SUMMARY
Discharge Summary - [facility]    Patient Information: Grant Gregg [de-identified] y o  male MRN: [id number]  Unit/Bed#: [address] [place] [address] Encounter: [id number]    Discharging Physician / Practitioner: Edie Montanez DO  PCP: Mila Tee DO  Admission Date: 1/10/2019  Discharge Date: 01/16/19    Reason for Admission: Shortness of Breath (pt presents to the ed with increased sob x 1 wk  pt states it became worse today )      Discharge Diagnoses:     Principal Problem (Resolved):    Acute respiratory failure with hypoxia (HCC)  Active Problems:    PVC (premature ventricular contraction)    Pericardial effusion    Essential hypertension    Stage 3 chronic kidney disease (HCC)    Controlled type 2 diabetes mellitus, without long-term current use of insulin (HCC)  Resolved Problems:    Acute combined systolic and diastolic congestive heart failure (HCC)    NSTEMI (non-ST elevated myocardial infarction) ([facility] [address] )        * Acute respiratory failure with hypoxia (HCC)resolved as of 1/16/2019   Assessment & Plan    Likely due to CHF exacerbation  Required BiPAP support initially and was in the ICU initially  Now off supplemental oxygen by nasal cannula and stable on room air  Acute combined systolic and diastolic congestive heart failure (HCC)resolved as of 1/16/2019   Assessment & Plan    He was on IV Lasix here with good diuresis  p o  Lasix on discharge  Continue Coreg  Echo as noted below  Patient advised to check daily weights at home and advised to call Cardiology if patient gains 2 lb in 1 day or if having worsening leg edema or getting short of breath      Pericardial effusion   Assessment & Plan    With no evidence of tamponade  Echo showed EF of 40% with grade 2 diastolic dysfunction    Moderate pericardial effusion was noted  He was on colchicine which was discontinued on discharge as per Cardiology recommendation  Repeat echo on 01/14 showed EF of 40% with small to moderate pericardial effusion     PVC (premature ventricular contraction)   Assessment & Plan    Patient with frequent PVCs and NSVT on telemetry  Dose of Coreg was increased but continued to have bigeminy and NSVT  As per Cardiology, patient is not a candidate for LifeVest at this time  He needs to follow up with EP which will be set up by Cardiology after discharge  Patient possesses a [place] license and 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  NSTEMI (non-ST elevated myocardial infarction) (HCC)resolved as of 1/16/2019   Assessment & Plan    Possibly NSTEMI type 2   Stress test was abnormal with fixed inferolateral wall defect but no clear ischemia  EF was noted to be 37%  Moderate-sized infarct noted  Continue aspirin, Coreg, Imdur, statin  Patient will follow up with Cardiology after discharge     Controlled type 2 diabetes mellitus, without long-term current use of insulin [facility])   Assessment & Plan    Lab Results   Component Value Date    HGBA1C 7 4 (H) 01/10/2019       Recent Labs      01/15/19   1606  01/15/19   2148  01/16/19   0724  01/16/19   1132   POCGLU  267*  203*  191*  263*     Continue metformin as he uses at home     Stage 3 chronic kidney disease ([facility] [address] )   Assessment & Plan    there is no baseline available for comparison but patient's creatinine has remained stable in the 2s range which is likely his baseline  Repeat lab work after discharge and follow up with PCP/Cardiology     Essential hypertension   Assessment & Plan    Blood pressure was initially elevated in the ER      Continue Coreg, Norvasc, Imdur  Discontinue home medications - verapamil, Avapro, hydrochlorothiazide as per Cardiology         Consultations During Hospital Stay:  IP CONSULT TO CASE MANAGEMENT  IP CONSULT TO NUTRITION SERVICES  IP CONSULT TO CARDIOLOGY  IP CONSULT TO PASTORAL CARE    Procedures Performed:     · Echo  · Stress test    Significant Findings:     · See hospital course and above    Imaging while in hospital:    Xr Chest 1 View Portable    Result Date: 1/11/2019  Narrative: CHEST INDICATION:   SOB  COMPARISON:  X-ray 8/11/08 EXAM PERFORMED/VIEWS:  XR CHEST PORTABLE FINDINGS:  Tubing projects over the right neck base and mediastinum, is external  Enlarged cardiomediastinal silhouette  The lungs are clear  No pneumothorax or pleural effusion  Osseous structures appear within normal limits for patient age  Impression: Enlarged cardiomediastinal silhouette  Pericardial effusion versus cardiomegaly  The study was marked in [facility] for immediate notification  Workstation performed: [id number]       Incidental Findings:   · none    Test Results Pending at Discharge (will require follow up):   · As per After Visit Summary     Outpatient Tests Requested:  · BMP    Complications:  See hospital course and above    Hospital Course:     Lissa Archer is a [de-identified] y o  male patient who originally presented to the hospital on 1/10/2019 due to worsening shortness of breath  In the ER patient was noted to be tachypneic and hypertensive with systolic blood pressure close to 200  He was placed on BiPAP, on nitro drip and received IV Lasix  he was initially admitted to the ICU  He was seen by Cardiology while here  His symptoms did improve with IV diuretics  His troponins were noted to be elevated and he underwent stress test   Patient noted to have PVCs and NSVT on monitor  Patient will need follow-up with EP in this will be set up by his cardiologist after discharge  He was cleared by Cardiology prior to discharge home  Discharge plan was discussed with not only the patient but also his son was present at bedside    Please see above list of diagnoses and related plan for additional information         Condition at Discharge: stable     Discharge Day Visit / Exam:     Subjective:  States breathing has improved    Vitals: Blood Pressure: 142/64 (01/16/19 1249)  Pulse: (!) 54 (01/16/19 1249)  Temperature: 98 °F (36 7 °C) (01/16/19 1249)  Temp Source: Oral (01/16/19 1249)  Respirations: 18 (01/16/19 1249)  Height: 5' 9" (175 3 cm) (01/10/19 2100)  Weight - Scale: 72 5 kg (159 lb 13 3 oz) (01/16/19 0600)  SpO2: 99 % (01/16/19 1249)  Exam:   Physical Exam   Constitutional: He is oriented to person, place, and time  No distress  HENT:   Head: Normocephalic and atraumatic  Mouth/Throat: Oropharynx is clear and moist    Eyes: EOM are normal  Right eye exhibits no discharge  Left eye exhibits no discharge  No scleral icterus  Neck: Neck supple  Cardiovascular:   Murmur heard  irregular   Pulmonary/Chest: Effort normal and breath sounds normal  No respiratory distress  He has no wheezes  He has no rales  Abdominal: Soft  Bowel sounds are normal  He exhibits no distension  There is no tenderness  Musculoskeletal: He exhibits edema (improved)  Neurological: He is alert and oriented to person, place, and time  No cranial nerve deficit  Skin: He is not diaphoretic  Psychiatric: He has a normal mood and affect  Discharge instructions/Information to patient and family:(Discharge Medications and Follow up):   See after visit summary for information provided to patient and family  Provisions for Follow-Up Care:  See after visit summary for information related to follow-up care and any pertinent home health orders  Disposition: Home    Planned Readmission:  No     Discharge Statement:  I spent > 30 minutes discharging the patient  This time was spent on the day of discharge  I had direct contact with the patient on the day of discharge  Greater than 50% of the total time was spent examining patient, answering all patient questions, arranging and discussing plan of care with patient as well as directly providing post-discharge instructions  Additional time then spent on discharge activities  Discharge Medications:  See after visit summary for reconciled discharge medications provided to patient and family  ** Please Note: "This note has been constructed using a voice recognition system  Therefore there may be syntax, spelling, and/or grammatical errors   Please call if you have any questions  "**

## 2019-01-21 ENCOUNTER — TELEPHONE (OUTPATIENT)
Dept: CARDIOLOGY CLINIC | Facility: CLINIC | Age: 79
End: 2019-01-21

## 2019-01-21 ENCOUNTER — EPISODE CHANGES (OUTPATIENT)
Dept: CASE MANAGEMENT | Facility: HOSPITAL | Age: 79
End: 2019-01-21

## 2019-01-23 ENCOUNTER — PATIENT OUTREACH (OUTPATIENT)
Dept: CASE MANAGEMENT | Facility: OTHER | Age: 79
End: 2019-01-23

## 2019-01-23 NOTE — PROGRESS NOTES
Patient feels he is doing very well since discharge  Mild SOB & edema persists, but improved  He weighs himself daily, wears compression socks, & elevates BLLE when able  He is working to improve his diet & to limit/monitor his salt intake  He reports BS of 118 yesterday morning & 140 today  He denies pain, lightheadedness, or dizziness  He will follow up with Dr Christin Kaufman on 1/31/19  He was unaware that his cardio appointment was on 1/29 & not 2/7/19, but will have not difficulty getting there  He is taking Lasix differently than ordered stating it was circled & handwritten as PRN on d/c papers he was given  His son manages his meds for him  Educated w/teach back on CHF, & S&S monitoring & management  He states he will call his provider with weight gain or worsening of SOB & edema  He is agreeable to outreach  Will mail [address] letter

## 2019-01-29 ENCOUNTER — OFFICE VISIT (OUTPATIENT)
Dept: CARDIOLOGY CLINIC | Facility: CLINIC | Age: 79
End: 2019-01-29
Payer: MEDICARE

## 2019-01-29 VITALS
SYSTOLIC BLOOD PRESSURE: 150 MMHG | HEART RATE: 64 BPM | HEIGHT: 69 IN | WEIGHT: 159 LBS | OXYGEN SATURATION: 97 % | BODY MASS INDEX: 23.55 KG/M2 | DIASTOLIC BLOOD PRESSURE: 54 MMHG

## 2019-01-29 DIAGNOSIS — I49.3 PVC (PREMATURE VENTRICULAR CONTRACTION): ICD-10-CM

## 2019-01-29 DIAGNOSIS — I10 ESSENTIAL HYPERTENSION: ICD-10-CM

## 2019-01-29 DIAGNOSIS — I31.3 PERICARDIAL EFFUSION: ICD-10-CM

## 2019-01-29 DIAGNOSIS — E11.9 CONTROLLED TYPE 2 DIABETES MELLITUS WITHOUT COMPLICATION, WITHOUT LONG-TERM CURRENT USE OF INSULIN (HCC): ICD-10-CM

## 2019-01-29 DIAGNOSIS — I50.22 CHRONIC SYSTOLIC CONGESTIVE HEART FAILURE (HCC): Primary | ICD-10-CM

## 2019-01-29 DIAGNOSIS — N18.30 STAGE 3 CHRONIC KIDNEY DISEASE (HCC): ICD-10-CM

## 2019-01-29 PROBLEM — R79.89 ELEVATED BRAIN NATRIURETIC PEPTIDE (BNP) LEVEL: Status: RESOLVED | Noted: 2019-01-10 | Resolved: 2019-01-29

## 2019-01-29 PROCEDURE — 93000 ELECTROCARDIOGRAM COMPLETE: CPT | Performed by: INTERNAL MEDICINE

## 2019-01-29 PROCEDURE — 99214 OFFICE O/P EST MOD 30 MIN: CPT | Performed by: INTERNAL MEDICINE

## 2019-01-29 NOTE — PROGRESS NOTES
Cardiology Followup    Dipak Ly  [id number]  1940  [address] [address]    Interval HistoryI: Dipak Ly is a [de-identified]y o  year old male who is here for followup of recent hospitalization for CHF  He was feeling dyspnea with moderate exertion for the past few months that significantly worsened earlier this month  Denies any chest pain  Previously, He was noted to have an irregular rhythm during an exam by Dr Bernardo Mcginnis but denies any palpitations, syncope or near syncope  He had bilateral LE edema for the past few years and was using furosemide daily  He was referred for cardiology evaluation and echocardiogram along with holter monitor were ordered  He developed worsening dyspnea and went to hospital instead  While hospitalized, he was noted to have renal failure, CHF with and EF of 40% and frequent PVCs  Stress test was abnormal with a fixed inferolateral wall defect without ischemia  There was a small to moderate sized pericardial effusion  Since hospital discharge, he has been feeling wall well with a marked improvement of his chronic dyspnea  He denies any chest pain  Lower extremity edema has improved  He denies any palpitations  Past Medical History:   Diagnosis Date    Diabetes mellitus ([facility] [address] )      Social History     Social History    Marital status:      Spouse name: N/A    Number of children: N/A    Years of education: N/A     Occupational History    Not on file  Social History Main Topics    Smoking status: Former Smoker    Smokeless tobacco: Former User    Alcohol use No    Drug use: No    Sexual activity: Not on file     Other Topics Concern    Not on file     Social History Narrative    No narrative on file      Family History   Problem Relation Age of Onset    Heart disease Mother      History reviewed  No pertinent surgical history      Current Outpatient Prescriptions:     allopurinol (ZYLOPRIM) 100 mg tablet, Take 100 mg by mouth 2 (two) times a day, Disp: , Rfl:     ALPRAZolam (XANAX) 0 5 mg tablet, , Disp: , Rfl: 0    amLODIPine (NORVASC) 5 mg tablet, Take 1 tablet (5 mg total) by mouth daily, Disp: 30 tablet, Rfl: 0    aspirin (ECOTRIN LOW STRENGTH) 81 mg EC tablet, Take 2 tablets (162 mg total) by mouth daily, Disp: 30 tablet, Rfl: 0    atorvastatin (LIPITOR) 40 mg tablet, Take 1 tablet (40 mg total) by mouth daily with dinner, Disp: 30 tablet, Rfl: 0    carvedilol (COREG) 6 25 mg tablet, Take 1 tablet (6 25 mg total) by mouth 2 (two) times a day with meals, Disp: 60 tablet, Rfl: 0    furosemide (LASIX) 40 mg tablet, Take 40 mg by mouth daily  , Disp: , Rfl:     glyBURIDE (DIABETA) 5 mg tablet, Take 5 mg by mouth 2 (two) times a day with meals  , Disp: , Rfl:     halobetasol (ULTRAVATE) 0 05 % cream, Apply 1 application topically daily as needed (Ezcema)  , Disp: , Rfl: 0    isosorbide mononitrate (IMDUR) 30 mg 24 hr tablet, Take 1 tablet (30 mg total) by mouth daily, Disp: 30 tablet, Rfl: 0    latanoprost (XALATAN) 0 005 % ophthalmic solution, 1 drop daily at bedtime, Disp: , Rfl:     metFORMIN (GLUCOPHAGE) 500 mg tablet, Take 500 mg by mouth 2 (two) times a day with meals, Disp: , Rfl:     timolol (TIMOPTIC) 0 5 % ophthalmic solution, Administer 1 drop to both eyes 2 (two) times a day, Disp: , Rfl: 0  Allergies   Allergen Reactions    Sulfa Antibiotics     Sulfur          Review of Systems:  Review of Systems   Constitutional: Negative for chills, fatigue and fever  HENT: Negative for congestion, nosebleeds and postnasal drip  Respiratory: Positive for shortness of breath  Negative for cough and chest tightness  Cardiovascular: Positive for leg swelling  Negative for chest pain and palpitations  Gastrointestinal: Negative for abdominal distention, abdominal pain, diarrhea, nausea and vomiting     Endocrine: Negative for polydipsia, polyphagia and polyuria  Musculoskeletal: Positive for arthralgias  Negative for gait problem and myalgias  Skin: Negative for color change, pallor and rash  Allergic/Immunologic: Negative for environmental allergies, food allergies and immunocompromised state  Neurological: Negative for dizziness, seizures, syncope and light-headedness  Hematological: Negative for adenopathy  Does not bruise/bleed easily  Psychiatric/Behavioral: Negative for dysphoric mood  The patient is not nervous/anxious  Physical Exam:  Vitals:    01/29/19 1343   BP: 150/54   BP Location: Left arm   Patient Position: Sitting   Cuff Size: Standard   Pulse: 64   SpO2: 97%   Weight: 72 1 kg (159 lb)   Height: 5' 9" (1 753 m)     Physical Exam   Constitutional: He is oriented to person, place, and time  He appears well-developed  No distress  HENT:   Head: Normocephalic and atraumatic  Eyes: Pupils are equal, round, and reactive to light  Conjunctivae and EOM are normal    Neck: Neck supple  No JVD present  No thyromegaly present  Cardiovascular: Normal rate  An irregular rhythm present  Exam reveals no gallop and no friction rub  Murmur heard  Pulmonary/Chest: Effort normal and breath sounds normal    Abdominal: Soft  He exhibits no distension  There is no tenderness  Musculoskeletal: He exhibits edema  Neurological: He is alert and oriented to person, place, and time  No cranial nerve deficit  Skin: Skin is warm and dry  No rash noted  He is not diaphoretic  No erythema  Chronic venostasis changes   Psychiatric: He has a normal mood and affect  His behavior is normal  Judgment and thought content normal        Labs: reviewed    Imaging: No results found  EKG (independently reviewed): NSR with PVCs    Discussion/Summary:  1  Chronic systolic congestive heart failure (HCC) - EF was 40% on recent echocardiogram   2  Essential hypertension  - BP elevated today     3  PVC (premature ventricular contraction) - may be the source for cardiomyopathy   4  Pericardial effusion - moderate on last echocardiogram will need followup   5  Stage 3 chronic kidney disease (HCC) - BMP ordered for this week   6   Controlled type 2 diabetes mellitus without complication, without long-term current use of insulin (HCC)      - Will repeat BMP  - Echocardiogram next month  - 24 hour holter to quantify amount of PVCs and adjust beta blocker if necessary  - Cannot use ACE inhbitor or ARB due to renal insufficiency

## 2019-01-30 ENCOUNTER — PATIENT OUTREACH (OUTPATIENT)
Dept: CASE MANAGEMENT | Facility: OTHER | Age: 79
End: 2019-01-30

## 2019-02-01 LAB — HBA1C MFR BLD HPLC: 7 %

## 2019-02-07 ENCOUNTER — PATIENT OUTREACH (OUTPATIENT)
Dept: CASE MANAGEMENT | Facility: OTHER | Age: 79
End: 2019-02-07

## 2019-02-14 ENCOUNTER — HOSPITAL ENCOUNTER (OUTPATIENT)
Dept: NON INVASIVE DIAGNOSTICS | Facility: HOSPITAL | Age: 79
Discharge: HOME | End: 2019-02-14
Attending: INTERNAL MEDICINE
Payer: MEDICARE

## 2019-02-14 DIAGNOSIS — I50.22 CHRONIC SYSTOLIC CONGESTIVE HEART FAILURE (HCC): ICD-10-CM

## 2019-02-14 DIAGNOSIS — I49.3 PVC (PREMATURE VENTRICULAR CONTRACTION): ICD-10-CM

## 2019-02-14 PROCEDURE — 93226 XTRNL ECG REC<48 HR SCAN A/R: CPT

## 2019-02-14 PROCEDURE — 93225 XTRNL ECG REC<48 HRS REC: CPT

## 2019-02-21 PROCEDURE — 93227 XTRNL ECG REC<48 HR R&I: CPT | Performed by: INTERNAL MEDICINE

## 2019-03-08 DIAGNOSIS — N18.9 CHRONIC KIDNEY DISEASE, UNSPECIFIED CKD STAGE: Primary | ICD-10-CM

## 2019-03-12 ENCOUNTER — DOCUMENTATION (OUTPATIENT)
Dept: NEPHROLOGY | Facility: CLINIC | Age: 79
End: 2019-03-12

## 2019-03-12 NOTE — PROGRESS NOTES
Called and talked with pt wife  Lab slip sent  He will go to Dr Candelaria Belle office for repeat blood work to be drawn

## 2019-03-20 LAB
EXT GLUCOSE BLD: 173
EXTERNAL BUN: 68
EXTERNAL CALCIUM: 9
EXTERNAL CHLORIDE: 95
EXTERNAL CO2: 23
EXTERNAL CREATININE: 2.52
EXTERNAL EGFR: 23
EXTERNAL POTASSIUM: 5
EXTERNAL SODIUM: 138

## 2019-04-01 ENCOUNTER — OFFICE VISIT (OUTPATIENT)
Dept: CARDIOLOGY CLINIC | Facility: CLINIC | Age: 79
End: 2019-04-01
Payer: MEDICARE

## 2019-04-01 VITALS
HEART RATE: 56 BPM | SYSTOLIC BLOOD PRESSURE: 134 MMHG | DIASTOLIC BLOOD PRESSURE: 56 MMHG | BODY MASS INDEX: 22.96 KG/M2 | WEIGHT: 155 LBS | HEIGHT: 69 IN

## 2019-04-01 DIAGNOSIS — I21.4 NSTEMI (NON-ST ELEVATED MYOCARDIAL INFARCTION) (HCC): ICD-10-CM

## 2019-04-01 DIAGNOSIS — I10 ESSENTIAL HYPERTENSION: ICD-10-CM

## 2019-04-01 DIAGNOSIS — I48.0 PAROXYSMAL ATRIAL FIBRILLATION (HCC): ICD-10-CM

## 2019-04-01 DIAGNOSIS — E11.9 CONTROLLED TYPE 2 DIABETES MELLITUS WITHOUT COMPLICATION, WITHOUT LONG-TERM CURRENT USE OF INSULIN (HCC): ICD-10-CM

## 2019-04-01 DIAGNOSIS — I31.3 PERICARDIAL EFFUSION: ICD-10-CM

## 2019-04-01 DIAGNOSIS — I50.22 CHRONIC SYSTOLIC CONGESTIVE HEART FAILURE (HCC): ICD-10-CM

## 2019-04-01 DIAGNOSIS — I49.3 PVC (PREMATURE VENTRICULAR CONTRACTION): Primary | ICD-10-CM

## 2019-04-01 PROCEDURE — 99214 OFFICE O/P EST MOD 30 MIN: CPT | Performed by: INTERNAL MEDICINE

## 2019-04-02 ENCOUNTER — TELEPHONE (OUTPATIENT)
Dept: CARDIOLOGY CLINIC | Facility: CLINIC | Age: 79
End: 2019-04-02

## 2019-04-02 RX ORDER — ASPIRIN 81 MG/1
81 TABLET ORAL DAILY
Qty: 30 TABLET | Refills: 0
Start: 2019-04-02 | End: 2020-03-06 | Stop reason: ALTCHOICE

## 2019-04-05 ENCOUNTER — CONSULT (OUTPATIENT)
Dept: NEPHROLOGY | Facility: CLINIC | Age: 79
End: 2019-04-05
Payer: MEDICARE

## 2019-04-05 VITALS
HEIGHT: 69 IN | BODY MASS INDEX: 23.55 KG/M2 | SYSTOLIC BLOOD PRESSURE: 130 MMHG | WEIGHT: 159 LBS | DIASTOLIC BLOOD PRESSURE: 66 MMHG

## 2019-04-05 DIAGNOSIS — I10 ESSENTIAL HYPERTENSION: ICD-10-CM

## 2019-04-05 DIAGNOSIS — N18.4 CKD (CHRONIC KIDNEY DISEASE) STAGE 4, GFR 15-29 ML/MIN (HCC): ICD-10-CM

## 2019-04-05 DIAGNOSIS — I50.22 CHRONIC SYSTOLIC CONGESTIVE HEART FAILURE (HCC): ICD-10-CM

## 2019-04-05 DIAGNOSIS — E11.9 CONTROLLED TYPE 2 DIABETES MELLITUS WITHOUT COMPLICATION, WITHOUT LONG-TERM CURRENT USE OF INSULIN (HCC): Primary | ICD-10-CM

## 2019-04-05 PROBLEM — N18.30 STAGE 3 CHRONIC KIDNEY DISEASE (HCC): Status: RESOLVED | Noted: 2019-01-10 | Resolved: 2019-04-05

## 2019-04-05 PROCEDURE — 99205 OFFICE O/P NEW HI 60 MIN: CPT | Performed by: INTERNAL MEDICINE

## 2019-04-08 ENCOUNTER — PATIENT OUTREACH (OUTPATIENT)
Dept: CASE MANAGEMENT | Facility: OTHER | Age: 79
End: 2019-04-08

## 2019-04-11 ENCOUNTER — HOSPITAL ENCOUNTER (OUTPATIENT)
Dept: RADIOLOGY | Facility: HOSPITAL | Age: 79
Discharge: HOME | End: 2019-04-11
Attending: INTERNAL MEDICINE
Payer: MEDICARE

## 2019-04-11 DIAGNOSIS — N18.4 CKD (CHRONIC KIDNEY DISEASE) STAGE 4, GFR 15-29 ML/MIN (HCC): ICD-10-CM

## 2019-04-11 PROCEDURE — 76770 US EXAM ABDO BACK WALL COMP: CPT

## 2019-04-16 ENCOUNTER — PATIENT OUTREACH (OUTPATIENT)
Dept: CASE MANAGEMENT | Facility: OTHER | Age: 79
End: 2019-04-16

## 2019-05-30 LAB
EXT GLUCOSE BLD: 241
EXTERNAL BUN: 69
EXTERNAL CALCIUM: 8.6
EXTERNAL CHLORIDE: 94
EXTERNAL CO2: 21
EXTERNAL CREATININE: 2.86
EXTERNAL EGFR: 20
EXTERNAL POTASSIUM: 5.2
EXTERNAL SODIUM: 135

## 2019-05-31 ENCOUNTER — DOCUMENTATION (OUTPATIENT)
Dept: NEPHROLOGY | Facility: CLINIC | Age: 79
End: 2019-05-31

## 2019-06-13 ENCOUNTER — OFFICE VISIT (OUTPATIENT)
Dept: CARDIOLOGY CLINIC | Facility: CLINIC | Age: 79
End: 2019-06-13
Payer: MEDICARE

## 2019-06-13 VITALS
HEART RATE: 67 BPM | WEIGHT: 153 LBS | OXYGEN SATURATION: 98 % | BODY MASS INDEX: 22.66 KG/M2 | DIASTOLIC BLOOD PRESSURE: 78 MMHG | SYSTOLIC BLOOD PRESSURE: 120 MMHG | HEIGHT: 69 IN

## 2019-06-13 DIAGNOSIS — I48.0 PAROXYSMAL ATRIAL FIBRILLATION (HCC): ICD-10-CM

## 2019-06-13 DIAGNOSIS — I31.3 PERICARDIAL EFFUSION: Primary | ICD-10-CM

## 2019-06-13 DIAGNOSIS — I50.22 CHRONIC SYSTOLIC CONGESTIVE HEART FAILURE (HCC): ICD-10-CM

## 2019-06-13 DIAGNOSIS — I10 ESSENTIAL HYPERTENSION: ICD-10-CM

## 2019-06-13 DIAGNOSIS — I49.3 PVC (PREMATURE VENTRICULAR CONTRACTION): ICD-10-CM

## 2019-06-13 PROCEDURE — 93000 ELECTROCARDIOGRAM COMPLETE: CPT | Performed by: INTERNAL MEDICINE

## 2019-06-13 PROCEDURE — 99214 OFFICE O/P EST MOD 30 MIN: CPT | Performed by: INTERNAL MEDICINE

## 2019-06-13 RX ORDER — GLIMEPIRIDE 4 MG/1
4 TABLET ORAL
COMMUNITY
End: 2022-01-09 | Stop reason: HOSPADM

## 2019-06-13 RX ORDER — AMOXICILLIN 500 MG/1
500 TABLET, FILM COATED ORAL 2 TIMES DAILY
COMMUNITY
End: 2019-10-18

## 2019-07-01 ENCOUNTER — TELEPHONE (OUTPATIENT)
Dept: NEPHROLOGY | Facility: CLINIC | Age: 79
End: 2019-07-01

## 2019-07-01 DIAGNOSIS — N18.4 CKD (CHRONIC KIDNEY DISEASE) STAGE 4, GFR 15-29 ML/MIN (HCC): Primary | ICD-10-CM

## 2019-07-01 DIAGNOSIS — N17.9 ACUTE KIDNEY INJURY (HCC): ICD-10-CM

## 2019-07-01 LAB
EXT GLUCOSE BLD: 275
EXTERNAL BUN: 80
EXTERNAL CALCIUM: 8.7
EXTERNAL CHLORIDE: 100
EXTERNAL CO2: 22
EXTERNAL CREATININE: 3.28
EXTERNAL EGFR: 17
EXTERNAL POTASSIUM: 4.7
EXTERNAL SODIUM: 137

## 2019-07-01 NOTE — TELEPHONE ENCOUNTER
I called the patient to go over his most recent blood work  His creatinine is elevated  He states he has lost a few pounds but it varies  He is currently taking lasix 20mg daily  He denies SOB  He denies changes in appetite  He denies N/V/D  He denies LE edema  He states he urinates a lot  He is not taking any antibiotics currently  He denies taking NSAIDs  I asked him to go for a urinalysis and more blood work in 1 week

## 2019-07-05 ENCOUNTER — TELEPHONE (OUTPATIENT)
Dept: NEPHROLOGY | Facility: CLINIC | Age: 79
End: 2019-07-05

## 2019-07-05 LAB
EXT BILIRUBIN, UA: NORMAL
EXT BLOOD, UA: NORMAL
EXT COLOR, UA: YELLOW
EXT GLUCOSE BLD: 185
EXT GLUCOSE, UA: NORMAL
EXT KETONES: NORMAL
EXT NITRITE, UA: NORMAL
EXT PH, UA: 5
EXT PROTEIN, UA: NORMAL
EXT SPECIFIC GRAVITY, UA: 1.01
EXT UROBILINOGEN: 0.2
EXTERNAL BUN: 76
EXTERNAL CALCIUM: 8.6
EXTERNAL CHLORIDE: 98
EXTERNAL CO2: 23
EXTERNAL CREATININE: 3.01
EXTERNAL EGFR: 19
EXTERNAL POTASSIUM: 4.7
EXTERNAL SODIUM: 136
WBC # BLD EST: NORMAL 10*3/UL

## 2019-07-05 NOTE — TELEPHONE ENCOUNTER
Both pt and his son were advised  ---- Message from Ilan Linder [address] [place] [address] sent at 7/5/2019 10:21 AM EDT -----  Please let patient/son know that creatinine is slightly improved to 3 0 from 3 3   UA okay  They have an appointment with Dr David Monge on 7/22 and they should get labs (in system already dated for 7/5) the week before that appointment

## 2019-07-22 ENCOUNTER — OFFICE VISIT (OUTPATIENT)
Dept: NEPHROLOGY | Facility: CLINIC | Age: 79
End: 2019-07-22
Payer: MEDICARE

## 2019-07-22 VITALS
SYSTOLIC BLOOD PRESSURE: 105 MMHG | BODY MASS INDEX: 21.92 KG/M2 | WEIGHT: 148 LBS | DIASTOLIC BLOOD PRESSURE: 58 MMHG | HEIGHT: 69 IN

## 2019-07-22 DIAGNOSIS — E11.9 CONTROLLED TYPE 2 DIABETES MELLITUS WITHOUT COMPLICATION, WITHOUT LONG-TERM CURRENT USE OF INSULIN (HCC): ICD-10-CM

## 2019-07-22 DIAGNOSIS — I50.22 CHRONIC SYSTOLIC CONGESTIVE HEART FAILURE (HCC): ICD-10-CM

## 2019-07-22 DIAGNOSIS — R82.81 PYURIA: Primary | ICD-10-CM

## 2019-07-22 DIAGNOSIS — I10 ESSENTIAL HYPERTENSION: ICD-10-CM

## 2019-07-22 DIAGNOSIS — N18.4 CKD (CHRONIC KIDNEY DISEASE) STAGE 4, GFR 15-29 ML/MIN (HCC): Primary | ICD-10-CM

## 2019-07-22 PROCEDURE — 99214 OFFICE O/P EST MOD 30 MIN: CPT | Performed by: INTERNAL MEDICINE

## 2019-07-22 PROCEDURE — 1124F ACP DISCUSS-NO DSCNMKR DOCD: CPT | Performed by: INTERNAL MEDICINE

## 2019-07-22 NOTE — PROGRESS NOTES
NEPHROLOGY OFFICE VISIT   Irlanda Andujar [de-identified] y o  male MRN: [id number]  7/22/2019    Reason for Visit:  Chronic kidney disease    ASSESSMENT and PLAN:    I had the pleasure of seeing Kita Ontiveros today in the renal clinic for the continued management of chronic kidney disease  Since our last visit, there has been no ER visits or hospitilizations  He currently has no complaints at this time and is feeling well  Patient denies any chest pain, shortness of breath and swelling  The last blood work was done on July 2019, which we have reviewed together  I had last seen him back in April where I discussed him that he had underlying chronic kidney disease stage 4  I did have him attend a kidney Smart class  He had some blood work done in June and his creatinine increased to 3 2 repeat done showed improvement to 3  And then we had another repeat done a week ago which showed his creatinine returned back to baseline at 2 6  He has no uremic symptoms  He currently has no swelling and no shortness of breath  He reports no dizziness or lightheadedness  He reports no urinary symptoms  He does not consistently check his blood pressure  1 ) Chronic kidney disease stage IV  -presumed etiology is most likely diabetic nephropathy, with possible component of hypertensive nephrosclerosis and arterial sclerosis    May even have a component of renal vascular disease  -had some recent fluctuations in his creatinine which I suspect is related to the fluctuations in his weight and possibly his fluid status  -I am going to revise his baseline creatinine has I think it is more between 2 5-3 with us accepting a higher baseline creatinine to keep him out of congestive heart failure  -I have spent a great deal of time discussing dialysis modalities with him  -Chronic Kidney Disease education/Kidney Smart:  I will refer him to a kidney Smart education class, offered on April 5th, which she attended  -we may need to be starting to talk about different dialysis access in the next upcoming appointment  -no urgent indication for dialysis at this time  -avoid NSAIDs  -renal ultrasound shows asymmetric kidneys  His right kidney measures 8 5 cm and his left kidney measures 10 9 cm  No evidence of hydronephrosis  -diabetic and blood pressure control  -renal function currently stable with a creatinine of 2 6     2 ) Chronic systolic congestive heart failure  -ejection fraction 40%  -follows with Cardiology- Dr Yecenia Stephens  -premature ventricular contraction, pericardial effusion, was moderate on the last echocardiogram  -currently on furosemide 40 mg daily  -EDW around 150-155 lbs, but his current weight is 142 lbs, on a low-dose diuretic  -daily weights  -urinalysis done last week was cloudy with 1+ WBC see esterase, trace blood, positive nitrite, 6-10 WBCs with moderate bacteria  He currently reports no symptoms of a urinary tract infection and feels asymptomatic  He has no fevers or chills  At this time will continue to monitor    He is to call me if he develops any symptoms and we can check a urine culture and empirically treat him with antibiotics  -low 2 g sodium diet  -carvedilol 6 25 mg twice a day  -isosorbide mononitrate 30 mg daily     3 ) Hypertension  -blood pressure at goal, on the lower side today but asymptomatic  -aim for less than 130/80  -amlodipine 5 mg daily  -carvedilol 6 25 mg twice a day  -furosemide 40 mg daily  -isosorbide mononitrate 30 mg daily  -low 2 g sodium diet  -monitor blood pressure at home     4 ) Diabetes mellitus type 2  -currently on metformin, given that his creatinine clearance is less than 30 cc/min, I would recommend discontinuing metformin, as to prevent lactic acidosis  -caution should be used with glyburide, which is usually recommended to avoid with a creatinine clearance less than 50 cc/min  -oral agents thyroid found to be relatively safe in non dialysis chronic kidney disease patients include short-acting sulfonylureas (glipizide, glimepiride, repaglinide), other options include sitagliptin and saxagliptin, which just require dose adjustment for reduced GFR  -another alternative would be insulin      PATIENT INSTRUCTIONS:    Patient Instructions   1 )  Low 2 g sodium diet    2 )  Monitor weights at home    3 )  Avoid NSAIDs    4 )  Monitor blood pressure at home, call if blood pressure greater than 150/90 persistently            Orders Placed This Encounter   Procedures    Basic metabolic panel     Standing Status:   Future     Standing Expiration Date:   7/22/2020    CBC     Standing Status:   Future     Standing Expiration Date:   7/22/2020    Vitamin D 25 hydroxy     Standing Status:   Future     Standing Expiration Date:   7/22/2020    PTH, intact     Standing Status:   Future     Standing Expiration Date:   7/22/2020    Phosphorus     Standing Status:   Future     Standing Expiration Date:   7/22/2020    Ambulatory referral to Nutrition Services     Standing Status:   Future     Standing Expiration Date:   1/22/2020     Referral Priority:   Routine     Referral Type:   Consult - AMB     Referral Reason:   Specialty Services Required     Requested Specialty:   Nutrition     Number of Visits Requested:   1     Expiration Date:   7/22/2020       OBJECTIVE:  Current Weight: Weight - Scale: 67 1 kg (148 lb)  Vitals:    07/22/19 0749 07/22/19 0821   BP:  105/58   Weight: 67 1 kg (148 lb)    Height: 5' 9" (1 753 m)     Body mass index is 21 86 kg/m²  REVIEW OF SYSTEMS:    Review of Systems   Constitutional: Negative for activity change and fever  Respiratory: Negative for cough, chest tightness, shortness of breath and wheezing  Cardiovascular: Negative for chest pain and leg swelling  Gastrointestinal: Negative for abdominal pain, diarrhea, nausea and vomiting  Endocrine: Negative for polyuria  Genitourinary: Negative for difficulty urinating, dysuria, flank pain, frequency and urgency     Skin: Negative for rash  Neurological: Negative for dizziness, syncope, light-headedness and headaches  PHYSICAL EXAM:      Physical Exam   Constitutional: He is oriented to person, place, and time  He appears well-developed and well-nourished  No distress  HENT:   Head: Normocephalic and atraumatic  Eyes: Pupils are equal, round, and reactive to light  No scleral icterus  Neck: Normal range of motion  Neck supple  Cardiovascular: Normal rate, regular rhythm and normal heart sounds  Exam reveals no gallop and no friction rub  No murmur heard  Pulmonary/Chest: Effort normal and breath sounds normal  No respiratory distress  He has no wheezes  He has no rales  He exhibits no tenderness  Abdominal: Soft  Bowel sounds are normal  He exhibits no distension  There is no tenderness  There is no rebound  Musculoskeletal: Normal range of motion  He exhibits no edema  Neurological: He is alert and oriented to person, place, and time  Skin: No rash noted  He is not diaphoretic  Psychiatric: He has a normal mood and affect  Nursing note and vitals reviewed        Medications:    Current Outpatient Medications:     allopurinol (ZYLOPRIM) 100 mg tablet, Take 100 mg by mouth 2 (two) times a day, Disp: , Rfl:     ALPRAZolam (XANAX) 0 5 mg tablet, , Disp: , Rfl: 0    amLODIPine (NORVASC) 5 mg tablet, Take 1 tablet (5 mg total) by mouth daily, Disp: 30 tablet, Rfl: 0    apixaban (ELIQUIS) 2 5 mg, Take 1 tablet (2 5 mg total) by mouth 2 (two) times a day, Disp: 60 tablet, Rfl: 11    aspirin (ECOTRIN LOW STRENGTH) 81 mg EC tablet, Take 1 tablet (81 mg total) by mouth daily, Disp: 30 tablet, Rfl: 0    atorvastatin (LIPITOR) 40 mg tablet, Take 1 tablet (40 mg total) by mouth daily with dinner, Disp: 30 tablet, Rfl: 0    carvedilol (COREG) 6 25 mg tablet, Take 1 tablet (6 25 mg total) by mouth 2 (two) times a day with meals, Disp: 60 tablet, Rfl: 0    furosemide (LASIX) 40 mg tablet, Take 40 mg by mouth daily  , Disp: , Rfl:     glimepiride (AMARYL) 4 mg tablet, Take 4 mg by mouth every morning before breakfast, Disp: , Rfl:     halobetasol (ULTRAVATE) 0 05 % cream, Apply 1 application topically daily as needed (Ezcema)  , Disp: , Rfl: 0    isosorbide mononitrate (IMDUR) 30 mg 24 hr tablet, Take 1 tablet (30 mg total) by mouth daily, Disp: 30 tablet, Rfl: 0    latanoprost (XALATAN) 0 005 % ophthalmic solution, 1 drop daily at bedtime, Disp: , Rfl:     sitaGLIPtin (JANUVIA) 50 mg tablet, Take 50 mg by mouth daily, Disp: , Rfl:     timolol (TIMOPTIC) 0 5 % ophthalmic solution, Administer 1 drop to both eyes 2 (two) times a day, Disp: , Rfl: 0    amoxicillin (AMOXIL) 500 MG tablet, Take 500 mg by mouth 2 (two) times a day, Disp: , Rfl:     glyBURIDE (DIABETA) 5 mg tablet, Take 5 mg by mouth 2 (two) times a day with meals  , Disp: , Rfl:     Laboratory Results:        Invalid input(s): ALBUMIN    Results for orders placed or performed in visit on 68/38/79   Basic metabolic panel   Result Value Ref Range    SODIUM 136     POTASSIUM 4 7     CHLORIDE 98     CO2 23     BUN 76     CREATININE 3 01     Glucose 185     EXTERNAL CALCIUM 8 6     ANION GAP      EXTERNAL EGFR 19    Urinalysis with microscopic   Result Value Ref Range    EXTERNAL COLOR,UA yellow     Spec Grav, UA (Ref:1 003-1 030) 1 011     Glucose, UA (Ref: Negative) neg     Ketones, UA (Ref: Negative) neg     Blood, UA neg     Nitrite, UA (Ref: Negative) neg      Leukocytes, UA (Ref: Negative) neg     pH, UA (Ref: 4 5-8 0) 5 0     Protein, UA (Ref: Negative) trace     Bilirubin, UA (Ref: Negative) neg     Urobilinogen, UA (Ref: 0 2- 1 0) 0 2     RBC, UA      EXTERNAL WBC, UA      EXTERNAL BACTERIA, UA      CASTS, UA

## 2019-08-09 ENCOUNTER — TELEPHONE (OUTPATIENT)
Dept: NEPHROLOGY | Facility: CLINIC | Age: 79
End: 2019-08-09

## 2019-08-09 NOTE — TELEPHONE ENCOUNTER
I called and left message for patient to call back and schedule dietician appt in our [place] office  I called again and left message 8/15

## 2019-09-05 ENCOUNTER — CLINICAL SUPPORT (OUTPATIENT)
Dept: NEPHROLOGY | Facility: CLINIC | Age: 79
End: 2019-09-05
Payer: MEDICARE

## 2019-09-05 DIAGNOSIS — N18.4 CKD (CHRONIC KIDNEY DISEASE) STAGE 4, GFR 15-29 ML/MIN (HCC): ICD-10-CM

## 2019-09-05 PROCEDURE — 97802 MEDICAL NUTRITION INDIV IN: CPT | Performed by: DIETITIAN, REGISTERED

## 2019-09-05 NOTE — PROGRESS NOTES
Initial Nutrition Assessment Form    Patient Name: Orlnado Muller    YOB: 1940    Sex:  Male     Assessment Date: 9/5/2019  Start Time: 16:10 Stop Time: 17:00 Total Minutes: 48     Data:  Present at session: self and son   Parent Concerns:    Medical Dx/Reason for Referral:  CKD4   Past Medical History:   Diagnosis Date    Chronic kidney disease     Diabetes mellitus ([address] )     Hyperlipidemia     Hypertension        Current Outpatient Medications   Medication Sig Dispense Refill    allopurinol (ZYLOPRIM) 100 mg tablet Take 100 mg by mouth 2 (two) times a day      ALPRAZolam (XANAX) 0 5 mg tablet   0    amLODIPine (NORVASC) 5 mg tablet Take 1 tablet (5 mg total) by mouth daily 30 tablet 0    amoxicillin (AMOXIL) 500 MG tablet Take 500 mg by mouth 2 (two) times a day      apixaban (ELIQUIS) 2 5 mg Take 1 tablet (2 5 mg total) by mouth 2 (two) times a day 60 tablet 11    aspirin (ECOTRIN LOW STRENGTH) 81 mg EC tablet Take 1 tablet (81 mg total) by mouth daily 30 tablet 0    atorvastatin (LIPITOR) 40 mg tablet Take 1 tablet (40 mg total) by mouth daily with dinner 30 tablet 0    carvedilol (COREG) 6 25 mg tablet Take 1 tablet (6 25 mg total) by mouth 2 (two) times a day with meals 60 tablet 0    furosemide (LASIX) 40 mg tablet Take 40 mg by mouth daily        glimepiride (AMARYL) 4 mg tablet Take 4 mg by mouth every morning before breakfast      glyBURIDE (DIABETA) 5 mg tablet Take 5 mg by mouth 2 (two) times a day with meals        halobetasol (ULTRAVATE) 0 05 % cream Apply 1 application topically daily as needed (Ezcema)    0    isosorbide mononitrate (IMDUR) 30 mg 24 hr tablet Take 1 tablet (30 mg total) by mouth daily 30 tablet 0    latanoprost (XALATAN) 0 005 % ophthalmic solution 1 drop daily at bedtime      sitaGLIPtin (JANUVIA) 50 mg tablet Take 50 mg by mouth daily      timolol (TIMOPTIC) 0 5 % ophthalmic solution Administer 1 drop to both eyes 2 (two) times a day  0     No current facility-administered medications for this visit  Additional Meds/Supplements:    Special Learning Needs:    Height:   HC Readings from Last 3 Encounters:   No data found for [facility]       Weight: Wt Readings from Last 12 Encounters:   07/22/19 67 1 kg (148 lb)   06/13/19 69 4 kg (153 lb)   04/05/19 72 1 kg (159 lb)   04/01/19 70 3 kg (155 lb)   01/29/19 72 1 kg (159 lb)   01/16/19 72 5 kg (159 lb 13 3 oz)   12/19/18 74 4 kg (164 lb)   05/23/18 77 7 kg (171 lb 3 2 oz)   04/11/18 75 3 kg (166 lb)   03/14/18 80 7 kg (178 lb)   02/09/18 80 2 kg (176 lb 12 8 oz)   01/02/18 80 7 kg (178 lb)     Estimated body mass index is 21 86 kg/m² as calculated from the following:    Height as of 7/22/19: 5' 9" (1 753 m)  Weight as of 7/22/19: 67 1 kg (148 lb)  Usual Weight: #  Ideal Body Weight: #   Recent Weight Change: ? No  Amount:    Doctor would like patient to gain weight, get back to over 150 lb   Energy Needs: No calculation needed   Allergies   Allergen Reactions    Sulfa Antibiotics     Sulfur        Social History     Substance and Sexual Activity   Alcohol Use No       Social History     Tobacco Use   Smoking Status Former Smoker   Smokeless Tobacco Former User       Who shops? patient and son   Who cooks? patient   Exercise: limited   Prior Counseling? ? Yes     When:      Why:  attended kidney smart class        Diet Hx:  Breakfast: 7 a m  Cream of wheat   Lunch: 12-1 p m  [place], leftovers       Dinner: 5 p m  Dines out frequently  Does choose fish, chicken over beef  Snacks: Likes radishes         Nutrition Diagnosis:   Food and nutrition related knowledge deficit  related to CKD4, renal diet restrictions  as evidenced by No prior knowledge of need for food and nutrition related recommendations       Medical Nutrition Therapy Intervention:  ?Individualized Meal Plan    Other Notes: Provided [facility] handouts:  Sodium, potassium, phosphorus, protein    Discusses limiting intake of high sodium foods, Jewell Bledsoe reports rarely using the salt shaker  Reviewed appropriate portion sizes of protein servings, Jewell Bledsoe was able to repeat back the ideal portion size is the palm of his hand  Reviewed 1800 calorie menu plan, as doctor would like Jewell Bledsoe to gain weight  Discussed keeping carbohydrate intake consistently, and watching portion sizes for protein  Jewell Bledsoe and his son enjoy dining out, reviewed the better options from the menu, and encouraged bringing home leftovers instead of eating too much at one time  Comprehension: ?Very Good    Receptivity: ?Very Good   Expected Compliance: ?Very Good        Goals:  1  Monitor protein intake, by choosing 4oz or less servings of meat, poultry, fish  2  Limit sodium intake, by eliminating the salt shaker at the table and choosing fresh ingredients over processed foods  3        No follow-ups on file    Labs:  CMP  Lab Results   Component Value Date    K 4 7 07/03/2019    CL 98 07/03/2019    CO2 23 07/03/2019    BUN 76 07/03/2019    CREATININE 3 01 07/03/2019    CALCIUM 8 6 07/03/2019    AST 17 01/11/2019    ALT 24 01/11/2019    ALKPHOS 105 01/11/2019    EGFR 19 07/03/2019       BMP  Lab Results   Component Value Date    CALCIUM 8 6 07/03/2019    K 4 7 07/03/2019    CO2 23 07/03/2019    CL 98 07/03/2019    BUN 76 07/03/2019    CREATININE 3 01 07/03/2019       Lipids  No results found for: CHOL  Lab Results   Component Value Date    HDL 34 (L) 01/15/2019     Lab Results   Component Value Date    LDLCALC 54 01/15/2019     Lab Results   Component Value Date    TRIG 70 01/15/2019     No results found for: CHOLHDL    Hemoglobin A1C  Lab Results   Component Value Date    HGBA1C 7 0 02/01/2019       Fasting Glucose  No results found for: GLUF    Insulin     Thyroid  No results found for: TSH, T1NIYQV, J5BSMQG, THYROIDAB    Hepatic Function Panel  Lab Results   Component Value Date    ALT 24 01/11/2019    AST 17 01/11/2019    ALKPHOS 105 01/11/2019       Celiac Disease Antibody Panel  No results found for: ENDOMYSIAL IGA, GLIADIN IGA, GLIADIN IGG, IGA, TISSUE TRANSGLUT AB, TTG IGA   Iron  No results found for: IRON, TIBC, FERRITIN    Vitamins  No results found for: VITAMIN B2   No results found for: NICOTINAMIDE, NICOTINIC ACID   No results found for: VITAMINB6  No results found for: [id number]  No results found for: VITB5  No results found for: [id number]  No results found for: THYROGLB  No results found for: VITAMIN K   No results found for: 25-HYDROXY VIT D   No components found for: Yong Duane  RD, LDN  [address] [facility]  [address] [id number]

## 2019-10-14 LAB — HBA1C MFR BLD HPLC: 8 %

## 2019-10-16 ENCOUNTER — DOCUMENTATION (OUTPATIENT)
Dept: NEPHROLOGY | Facility: CLINIC | Age: 79
End: 2019-10-16

## 2019-10-16 LAB
EXT GLUCOSE BLD: 354
EXTERNAL BUN: 62
EXTERNAL CALCIUM: 8.3
EXTERNAL CHLORIDE: 97
EXTERNAL CO2: 24
EXTERNAL CREATININE: 2.53
EXTERNAL EGFR: 23
EXTERNAL PHOSPHORUS: 4.1
EXTERNAL POTASSIUM: 5
EXTERNAL PTH: 98
EXTERNAL SODIUM: 136
EXTERNAL VITAMIN D,25: 29.5
HCT VFR BLD AUTO: 52.7 % (ref 36.5–49.3)
HGB BLD-MCNC: 17.6 G/DL (ref 12–17)
PLATELET # BLD AUTO: 123 THOUSANDS/UL (ref 149–390)
WBC # BLD AUTO: 6 THOUSAND/UL

## 2019-10-18 ENCOUNTER — OFFICE VISIT (OUTPATIENT)
Dept: NEPHROLOGY | Facility: CLINIC | Age: 79
End: 2019-10-18
Payer: MEDICARE

## 2019-10-18 VITALS
HEIGHT: 69 IN | DIASTOLIC BLOOD PRESSURE: 66 MMHG | SYSTOLIC BLOOD PRESSURE: 126 MMHG | WEIGHT: 150.8 LBS | BODY MASS INDEX: 22.33 KG/M2

## 2019-10-18 DIAGNOSIS — E11.9 CONTROLLED TYPE 2 DIABETES MELLITUS WITHOUT COMPLICATION, WITHOUT LONG-TERM CURRENT USE OF INSULIN (HCC): ICD-10-CM

## 2019-10-18 DIAGNOSIS — R60.0 LEG EDEMA: ICD-10-CM

## 2019-10-18 DIAGNOSIS — E55.9 VITAMIN D DEFICIENCY: ICD-10-CM

## 2019-10-18 DIAGNOSIS — I10 ESSENTIAL HYPERTENSION: ICD-10-CM

## 2019-10-18 DIAGNOSIS — I50.22 CHRONIC SYSTOLIC CONGESTIVE HEART FAILURE (HCC): Primary | ICD-10-CM

## 2019-10-18 DIAGNOSIS — N18.4 CKD (CHRONIC KIDNEY DISEASE) STAGE 4, GFR 15-29 ML/MIN (HCC): ICD-10-CM

## 2019-10-18 PROCEDURE — 99214 OFFICE O/P EST MOD 30 MIN: CPT | Performed by: INTERNAL MEDICINE

## 2019-10-18 RX ORDER — MELATONIN
1000 DAILY
Qty: 60 TABLET | Refills: 3 | Status: SHIPPED | OUTPATIENT
Start: 2019-10-18

## 2019-10-18 NOTE — PROGRESS NOTES
NEPHROLOGY OFFICE VISIT   Elvin Forte [de-identified] y o  male MRN: [id number]  10/18/2019    Reason for Visit:  Chronic kidney disease    ASSESSMENT and PLAN:    I had the pleasure of seeing Gwen Escobar today in the renal clinic for the continued management of chronic kidney disease  Since our last visit, there has been no ER visits or hospitilizations  He currently has no complaints at this time and is feeling well  Patient denies any chest pain, shortness of breath and swelling  The last blood work was done on October 2019, which we have reviewed together  1 ) Chronic kidney disease stage IV  -presumed etiology is most likely diabetic nephropathy, with possible component of hypertensive nephrosclerosis and arterial sclerosis  May even have a component of renal vascular disease  -baseline creatinine 2 5-3 mg/dL  -I have discussed different dialysis modalities with him  -Chronic Kidney Disease education/Kidney Smart:  Attended  -I will discuss about obtaining an access at the next visit  -no urgent indication for dialysis at this time  -avoid NSAIDs  -renal ultrasound shows asymmetric kidneys  His right kidney measures 8 5 cm and his left kidney measures 10 9 cm  No evidence of hydronephrosis  -diabetic and blood pressure control  -renal function currently stable with a creatinine of 2 5     2  ) Chronic systolic congestive heart failure  -ejection fraction 40%  -follows with Cardiology- Dr Josefa Neves  -premature ventricular contraction, pericardial effusion, was moderate on the last echocardiogram  -currently on furosemide 40 mg daily  -EDW around 150-155 lbs, but his current weight is 150 lbs, on a low-dose diuretic  -daily weights  -urinalysis done last week was cloudy with 1+ WBC see esterase, trace blood, positive nitrite, 6-10 WBCs with moderate bacteria  He currently reports no symptoms of a urinary tract infection and feels asymptomatic  He has no fevers or chills  At this time will continue to monitor    He is to call me if he develops any symptoms and we can check a urine culture and empirically treat him with antibiotics  -low 2 g sodium diet  -carvedilol 6 25 mg twice a day  -isosorbide mononitrate 30 mg daily     3  ) Hypertension  -blood pressure at goal  -aim for less than 130/80  -amlodipine 5 mg daily  -carvedilol 6 25 mg twice a day  -furosemide 40 mg daily  -isosorbide mononitrate 30 mg daily  -low 2 g sodium diet  -monitor blood pressure at home     4 ) Diabetes mellitus type 2  -management as per primary team   Avoid metformin given his level of GFR please dose medications for creatinine clearance between 15-20 cc/min    5 ) Vitamin-D deficiency  -start vitamin-D 1000 units daily  -vitamin-D 25 hydroxy levels 29    6 ) Elevated hemoglobin  -no evidence of cyst on the kidney based on ultrasound done in April 2019  -former smoker      PATIENT INSTRUCTIONS:    Patient Instructions   1 )  Low 2 g sodium diet    2 )  Monitor weights at home    3 )  Avoid NSAIDs    4 )  Monitor blood pressure at home, call if blood pressure greater than 150/90 persistently    5 ) Start Vitamin D 1,000 units daily          Orders Placed This Encounter   Procedures    Comprehensive metabolic panel     Standing Status:   Future     Standing Expiration Date:   10/18/2020    CBC     Standing Status:   Future     Standing Expiration Date:   10/18/2020    Protein / creatinine ratio, urine     Standing Status:   Future     Standing Expiration Date:   10/18/2020    Vitamin D 25 hydroxy     Standing Status:   Future     Standing Expiration Date:   10/18/2020    PTH, intact     Standing Status:   Future     Standing Expiration Date:   10/18/2020       OBJECTIVE:  Current Weight: Weight - Scale: 68 4 kg (150 lb 12 8 oz)  Vitals:    10/18/19 1024 10/18/19 1028   BP:  126/66   Weight: 68 4 kg (150 lb 12 8 oz)    Height: 5' 9" (1 753 m)     Body mass index is 22 27 kg/m²        REVIEW OF SYSTEMS:    Review of Systems   Constitutional: Negative for activity change and fever  Respiratory: Negative for cough, chest tightness, shortness of breath and wheezing  Cardiovascular: Negative for chest pain and leg swelling  Gastrointestinal: Negative for abdominal pain, diarrhea, nausea and vomiting  Endocrine: Negative for polyuria  Genitourinary: Negative for difficulty urinating, dysuria, flank pain, frequency and urgency  Skin: Negative for rash  Neurological: Negative for dizziness, syncope, light-headedness and headaches  PHYSICAL EXAM:      Physical Exam   Constitutional: He is oriented to person, place, and time  He appears well-developed and well-nourished  No distress  HENT:   Head: Normocephalic and atraumatic  Eyes: Pupils are equal, round, and reactive to light  No scleral icterus  Neck: Normal range of motion  Neck supple  Cardiovascular: Normal rate, regular rhythm and normal heart sounds  Exam reveals no gallop and no friction rub  No murmur heard  Pulmonary/Chest: Effort normal and breath sounds normal  No respiratory distress  He has no wheezes  He has no rales  He exhibits no tenderness  Abdominal: Soft  Bowel sounds are normal  He exhibits no distension  There is no tenderness  There is no rebound  Musculoskeletal: Normal range of motion  He exhibits edema  Neurological: He is alert and oriented to person, place, and time  Skin: No rash noted  He is not diaphoretic  Psychiatric: He has a normal mood and affect  Nursing note and vitals reviewed        Medications:    Current Outpatient Medications:     allopurinol (ZYLOPRIM) 100 mg tablet, Take 100 mg by mouth 2 (two) times a day, Disp: , Rfl:     ALPRAZolam (XANAX) 0 5 mg tablet, , Disp: , Rfl: 0    amLODIPine (NORVASC) 5 mg tablet, Take 1 tablet (5 mg total) by mouth daily (Patient taking differently: Take 2 5 mg by mouth daily ), Disp: 30 tablet, Rfl: 0    apixaban (ELIQUIS) 2 5 mg, Take 1 tablet (2 5 mg total) by mouth 2 (two) times a day, Disp: 60 tablet, Rfl: 11    atorvastatin (LIPITOR) 40 mg tablet, Take 1 tablet (40 mg total) by mouth daily with dinner, Disp: 30 tablet, Rfl: 0    carvedilol (COREG) 6 25 mg tablet, Take 1 tablet (6 25 mg total) by mouth 2 (two) times a day with meals, Disp: 60 tablet, Rfl: 0    furosemide (LASIX) 40 mg tablet, Take 40 mg by mouth daily  , Disp: , Rfl:     glimepiride (AMARYL) 4 mg tablet, Take 4 mg by mouth 2 (two) times a day , Disp: , Rfl:     glyBURIDE (DIABETA) 5 mg tablet, Take 5 mg by mouth 2 (two) times a day with meals  , Disp: , Rfl:     halobetasol (ULTRAVATE) 0 05 % cream, Apply 1 application topically daily as needed (Ezcema)  , Disp: , Rfl: 0    isosorbide mononitrate (IMDUR) 30 mg 24 hr tablet, Take 1 tablet (30 mg total) by mouth daily, Disp: 30 tablet, Rfl: 0    latanoprost (XALATAN) 0 005 % ophthalmic solution, 1 drop daily at bedtime, Disp: , Rfl:     sitaGLIPtin (JANUVIA) 100 mg tablet, Take 100 mg by mouth daily , Disp: , Rfl:     timolol (TIMOPTIC) 0 5 % ophthalmic solution, Administer 1 drop to both eyes 2 (two) times a day, Disp: , Rfl: 0    amoxicillin (AMOXIL) 500 MG tablet, Take 500 mg by mouth 2 (two) times a day, Disp: , Rfl:     aspirin (ECOTRIN LOW STRENGTH) 81 mg EC tablet, Take 1 tablet (81 mg total) by mouth daily (Patient not taking: Reported on 10/18/2019), Disp: 30 tablet, Rfl: 0    cholecalciferol (VITAMIN D3) 1,000 units tablet, Take 1 tablet (1,000 Units total) by mouth daily, Disp: 60 tablet, Rfl: 3    Laboratory Results:  Results from last 7 days   Lab Units 10/14/19   WBC Thousand/uL 6 00   HEMOGLOBIN g/dL 17 6*   HEMATOCRIT % 52 7*   PLATELETS Thousands/uL 123*   POTASSIUM  5 0   CHLORIDE  97   CO2  24   BUN  62   CREATININE  2 53   CALCIUM  8 3   PHOSPHORUS  4 1       Results for orders placed or performed in visit on 10/16/19   CBC   Result Value Ref Range    WBC 6 00 Thousand/uL    Hemoglobin 17 6 (A) 12 0 - 17 0 g/dL    Hematocrit 52 7 (A) 36 5 - 49 3 % Platelets 959 (A) 727 - 390 Thousands/uL   Basic metabolic panel   Result Value Ref Range    SODIUM 136     POTASSIUM 5 0     CHLORIDE 97     CO2 24     BUN 62     CREATININE 2 53     Glucose 354     EXTERNAL CALCIUM 8 3     ANION GAP      EXTERNAL EGFR 23    Vitamin D 25 hydroxy   Result Value Ref Range    EXTERNAL VITAMIN D,25 29 5    Phosphorus   Result Value Ref Range    EXTERNAL PHOSPHORUS 4 1    PTH, intact   Result Value Ref Range    PTH 98

## 2019-10-18 NOTE — PATIENT INSTRUCTIONS
1  )  Low 2 g sodium diet    2 )  Monitor weights at home    3 )  Avoid NSAIDs    4 )  Monitor blood pressure at home, call if blood pressure greater than 150/90 persistently    5 ) Start Vitamin D 1,000 units daily

## 2019-10-24 ENCOUNTER — OFFICE VISIT (OUTPATIENT)
Dept: CARDIOLOGY CLINIC | Facility: CLINIC | Age: 79
End: 2019-10-24
Payer: MEDICARE

## 2019-10-24 VITALS
OXYGEN SATURATION: 99 % | HEART RATE: 72 BPM | DIASTOLIC BLOOD PRESSURE: 68 MMHG | HEIGHT: 69 IN | SYSTOLIC BLOOD PRESSURE: 122 MMHG | WEIGHT: 150 LBS | BODY MASS INDEX: 22.22 KG/M2

## 2019-10-24 DIAGNOSIS — I50.22 CHRONIC SYSTOLIC CHF (CONGESTIVE HEART FAILURE) (HCC): ICD-10-CM

## 2019-10-24 DIAGNOSIS — I10 ESSENTIAL HYPERTENSION: ICD-10-CM

## 2019-10-24 DIAGNOSIS — I49.3 PVC (PREMATURE VENTRICULAR CONTRACTION): ICD-10-CM

## 2019-10-24 DIAGNOSIS — E11.9 CONTROLLED TYPE 2 DIABETES MELLITUS WITHOUT COMPLICATION, WITHOUT LONG-TERM CURRENT USE OF INSULIN (HCC): ICD-10-CM

## 2019-10-24 DIAGNOSIS — I48.0 PAF (PAROXYSMAL ATRIAL FIBRILLATION) (HCC): ICD-10-CM

## 2019-10-24 DIAGNOSIS — I31.3 PERICARDIAL EFFUSION: Primary | ICD-10-CM

## 2019-10-24 DIAGNOSIS — I50.22 CHRONIC SYSTOLIC CONGESTIVE HEART FAILURE (HCC): ICD-10-CM

## 2019-10-24 PROCEDURE — 99214 OFFICE O/P EST MOD 30 MIN: CPT | Performed by: INTERNAL MEDICINE

## 2019-10-24 PROCEDURE — 93000 ELECTROCARDIOGRAM COMPLETE: CPT | Performed by: INTERNAL MEDICINE

## 2019-10-24 RX ORDER — AMLODIPINE BESYLATE 2.5 MG/1
2.5 TABLET ORAL DAILY
Refills: 0 | COMMUNITY
Start: 2019-10-01 | End: 2021-01-11

## 2019-10-24 NOTE — PROGRESS NOTES
Cardiology Followup    Shahla Chaudhry  [id number]  1940      Interval HistoryI: Shahla Chaudhry is a [de-identified]y o  year old male who is here for followup of CHF  He has been feeling well without any complaints at this time  He denies any exertional chest pain or shortness of breath  He has occasional lightheadedness  He is frustrated because he feels his diet is very restrictive because of renal disease and diabetes  Last creatinine was 2 5 which was improved from previous  In the past, he was noted to have an irregular heart beat during a visit with Dr Worley Age  He was referred for holter monitor  Holter monitor showed the presence of atrial fibrillation and he was started on Eliquis  He had no symptoms at the time  Previously, he was having dyspnea with minimal exertion and was hospitalized in January 2019  While hospitalized, he was noted to have renal failure, CHF with and EF of 40% and frequent PVCs  Stress test was abnormal with a fixed inferolateral wall defect without ischemia  There was a small to moderate sized pericardial effusion  Past Medical History:   Diagnosis Date    Chronic kidney disease     Diabetes mellitus ([facility] [address] )     Hyperlipidemia     Hypertension      Social History     Socioeconomic History    Marital status:       Spouse name: Not on file    Number of children: Not on file    Years of education: Not on file    Highest education level: Not on file   Occupational History    Not on file   Social Needs    Financial resource strain: Not on file    Food insecurity:     Worry: Not on file     Inability: Not on file    Transportation needs:     Medical: Not on file     Non-medical: Not on file   Tobacco Use    Smoking status: Former Smoker    Smokeless tobacco: Former User   Substance and Sexual Activity    Alcohol use: No    Drug use: No    Sexual activity: Not on file   Lifestyle    Physical activity:     Days per week: Not on file Minutes per session: Not on file    Stress: Not on file   Relationships    Social connections:     Talks on phone: Not on file     Gets together: Not on file     Attends Buddhist service: Not on file     Active member of club or organization: Not on file     Attends meetings of clubs or organizations: Not on file     Relationship status: Not on file    Intimate partner violence:     Fear of current or ex partner: Not on file     Emotionally abused: Not on file     Physically abused: Not on file     Forced sexual activity: Not on file   Other Topics Concern    Not on file   Social History Narrative    Not on file      Family History   Problem Relation Age of Onset    Heart disease Mother     No Known Problems Father      History reviewed  No pertinent surgical history      Current Outpatient Medications:     allopurinol (ZYLOPRIM) 100 mg tablet, Take 100 mg by mouth 2 (two) times a day, Disp: , Rfl:     ALPRAZolam (XANAX) 0 5 mg tablet, , Disp: , Rfl: 0    amLODIPine (NORVASC) 2 5 mg tablet, Take 2 5 mg by mouth daily, Disp: , Rfl: 0    apixaban (ELIQUIS) 2 5 mg, Take 1 tablet (2 5 mg total) by mouth 2 (two) times a day, Disp: 60 tablet, Rfl: 11    atorvastatin (LIPITOR) 40 mg tablet, Take 1 tablet (40 mg total) by mouth daily with dinner, Disp: 30 tablet, Rfl: 0    carvedilol (COREG) 6 25 mg tablet, Take 1 tablet (6 25 mg total) by mouth 2 (two) times a day with meals, Disp: 60 tablet, Rfl: 0    cholecalciferol (VITAMIN D3) 1,000 units tablet, Take 1 tablet (1,000 Units total) by mouth daily, Disp: 60 tablet, Rfl: 3    furosemide (LASIX) 40 mg tablet, Take 40 mg by mouth daily  , Disp: , Rfl:     glimepiride (AMARYL) 4 mg tablet, Take 4 mg by mouth 2 (two) times a day , Disp: , Rfl:     glyBURIDE (DIABETA) 5 mg tablet, Take 5 mg by mouth 2 (two) times a day with meals  , Disp: , Rfl:     halobetasol (ULTRAVATE) 0 05 % cream, Apply 1 application topically daily as needed (Ezcema)  , Disp: , Rfl: 0    isosorbide mononitrate (IMDUR) 30 mg 24 hr tablet, Take 1 tablet (30 mg total) by mouth daily, Disp: 30 tablet, Rfl: 0    latanoprost (XALATAN) 0 005 % ophthalmic solution, 1 drop daily at bedtime, Disp: , Rfl:     sitaGLIPtin (JANUVIA) 100 mg tablet, Take 100 mg by mouth daily , Disp: , Rfl:     timolol (TIMOPTIC) 0 5 % ophthalmic solution, Administer 1 drop to both eyes 2 (two) times a day, Disp: , Rfl: 0    amLODIPine (NORVASC) 5 mg tablet, Take 1 tablet (5 mg total) by mouth daily (Patient not taking: Reported on 10/24/2019), Disp: 30 tablet, Rfl: 0    aspirin (ECOTRIN LOW STRENGTH) 81 mg EC tablet, Take 1 tablet (81 mg total) by mouth daily (Patient not taking: Reported on 10/18/2019), Disp: 30 tablet, Rfl: 0  Allergies   Allergen Reactions    Sulfa Antibiotics     Sulfur          Review of Systems:  Review of Systems   Constitutional: Negative for chills, fatigue and fever  HENT: Negative for congestion, nosebleeds and postnasal drip  Respiratory: Negative for cough, chest tightness and shortness of breath  Cardiovascular: Negative for chest pain, palpitations and leg swelling  Gastrointestinal: Negative for abdominal distention, abdominal pain, diarrhea, nausea and vomiting  Endocrine: Negative for polydipsia, polyphagia and polyuria  Musculoskeletal: Positive for arthralgias  Negative for gait problem and myalgias  Skin: Negative for color change, pallor and rash  Allergic/Immunologic: Negative for environmental allergies, food allergies and immunocompromised state  Neurological: Positive for light-headedness  Negative for dizziness, seizures and syncope  Hematological: Negative for adenopathy  Does not bruise/bleed easily  Psychiatric/Behavioral: Negative for dysphoric mood  The patient is not nervous/anxious          Physical Exam:  Vitals:    10/24/19 1122   BP: 122/68   BP Location: Left arm   Patient Position: Sitting   Cuff Size: Standard   Pulse: 72   SpO2: 99% Weight: 68 kg (150 lb)   Height: 5' 9" (1 753 m)     Physical Exam   Constitutional: He is oriented to person, place, and time  He appears well-developed  No distress  HENT:   Head: Normocephalic and atraumatic  Eyes: Pupils are equal, round, and reactive to light  Conjunctivae and EOM are normal    Neck: Neck supple  No JVD present  No thyromegaly present  Cardiovascular: Normal rate and intact distal pulses  An irregularly irregular rhythm present  Exam reveals no gallop and no friction rub  Murmur heard  Pulmonary/Chest: Effort normal and breath sounds normal    Abdominal: Soft  He exhibits no distension  There is no tenderness  Musculoskeletal: He exhibits edema  Neurological: He is alert and oriented to person, place, and time  No cranial nerve deficit  Skin: Skin is warm and dry  No rash noted  He is not diaphoretic  No erythema  Chronic venostasis changes  Psychiatric: He has a normal mood and affect  His behavior is normal  Judgment and thought content normal        Labs: reviewed    Imaging: No results found  ECG: Atrial fibrillation at 75 bpm    Discussion/Summary:  1  Chronic systolic congestive heart failure (HCC) - EF was 40% on recent echocardiogram   2  Essential hypertension  - BP elevated today  3  PVC (premature ventricular contraction) - may be the source for cardiomyopathy   4  Pericardial effusion - moderate on last echocardiogram will need followup   5  Stage 3 chronic kidney disease (HCC) - BMP ordered for this week   6  Controlled type 2 diabetes mellitus without complication, without long-term current use of insulin ([facility])      - Continue Eliquis 2 5 mg twice a day  - Following up with nephrology for renal failure  Recent blood work was reviewed  - continue carvedilol 6 25 mg b i d   -  Discussed atrial fibrillation with patient and his son  No indication for cardioversion at this time  - Discussed diet and exercise    He may liberalize the amount of fruits and vegetables in diet along with whole wheat grains  Avoid fruit juices  He has seen nutritionist in the past   - Repeat blood work ordered in 3 months  Will followup afterwards

## 2020-01-13 LAB — HBA1C MFR BLD HPLC: 6.9 %

## 2020-01-23 ENCOUNTER — DOCUMENTATION (OUTPATIENT)
Dept: NEPHROLOGY | Facility: CLINIC | Age: 80
End: 2020-01-23

## 2020-01-23 LAB
25(OH)D3 SERPL-MCNC: 30.2 NG/ML (ref 30–100)
ALBUMIN SNV-MCNC: 3.8 G/DL
ALP SERPL-CCNC: 138 U/L (ref 46–116)
BUN SERPL-MCNC: 49 MG/DL (ref 5–25)
CALCIUM SERPL-MCNC: 8.4 MG/DL (ref 8.3–10.1)
CHLORIDE SERPL-SCNC: 103 MMOL/L (ref 100–108)
CO2 SERPL-SCNC: 18 MMOL/L (ref 21–32)
CREAT SERPL-MCNC: 2.4 MG/DL (ref 0.6–1.3)
GLUCOSE SERPL-MCNC: 162 MG/DL
HBA1C MFR BLD: 6.9 % (ref 4.2–6.3)
HCT VFR BLD AUTO: 38.5 % (ref 36.5–49.3)
HGB BLD-MCNC: 13.1 G/DL (ref 12–17)
PLATELET # BLD AUTO: 129 THOUSANDS/UL (ref 149–390)
POTASSIUM SERPL-SCNC: 4.3 MMOL/L (ref 3.5–5.3)
PTH-INTACT SERPL-MCNC: 101 PG/ML
SODIUM SERPL-SCNC: 138 MMOL/L (ref 136–145)
URATE SERPL-MCNC: 9 MG/DL (ref 4.2–8)
WBC # BLD AUTO: 5.6 THOUSAND/UL

## 2020-02-19 ENCOUNTER — OFFICE VISIT (OUTPATIENT)
Dept: NEPHROLOGY | Facility: CLINIC | Age: 80
End: 2020-02-19
Payer: MEDICARE

## 2020-02-19 VITALS
SYSTOLIC BLOOD PRESSURE: 160 MMHG | HEIGHT: 69 IN | BODY MASS INDEX: 23.4 KG/M2 | WEIGHT: 158 LBS | DIASTOLIC BLOOD PRESSURE: 70 MMHG

## 2020-02-19 DIAGNOSIS — N18.4 TYPE 2 DIABETES MELLITUS WITH STAGE 4 CHRONIC KIDNEY DISEASE AND HYPERTENSION (HCC): ICD-10-CM

## 2020-02-19 DIAGNOSIS — I12.9 TYPE 2 DIABETES MELLITUS WITH STAGE 4 CHRONIC KIDNEY DISEASE AND HYPERTENSION (HCC): ICD-10-CM

## 2020-02-19 DIAGNOSIS — I50.22 CHRONIC SYSTOLIC CONGESTIVE HEART FAILURE (HCC): Primary | ICD-10-CM

## 2020-02-19 DIAGNOSIS — E11.22 TYPE 2 DIABETES MELLITUS WITH STAGE 4 CHRONIC KIDNEY DISEASE AND HYPERTENSION (HCC): ICD-10-CM

## 2020-02-19 DIAGNOSIS — E55.9 VITAMIN D DEFICIENCY: ICD-10-CM

## 2020-02-19 PROCEDURE — 99214 OFFICE O/P EST MOD 30 MIN: CPT | Performed by: INTERNAL MEDICINE

## 2020-02-19 RX ORDER — MINERAL OIL 100 MG/100ML
OIL ORAL; TOPICAL DAILY
Qty: 1 DEVICE | Refills: 0 | Status: SHIPPED | OUTPATIENT
Start: 2020-02-19

## 2020-02-19 NOTE — PROGRESS NOTES
NEPHROLOGY OFFICE VISIT   Jenna Nash 78 y o  male MRN: [id number]  2/19/2020    Reason for Visit:  Chronic kidney disease    History of Present Illness (HPI):    I had the pleasure of seeing Melissa Garcia today in the renal clinic for the continued management of chronic kidney disease  Since our last visit, there has been no ER visits or hospitilizations  He currently has no complaints at this time and is feeling well  Patient denies any chest pain, shortness of breath and swelling  The last blood work was done on January 2020, which we have reviewed together  His weight did increase from 150 lb to 158 lb today  He does not check his blood pressure at home on a consistent basis  He reports that at his last PCP office visit it was 120/60  His blood pressure was well controlled at our last visit and 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  ASSESSMENT and PLAN:      1 ) Chronic kidney disease stage IV  -presumed etiology is most likely diabetic nephropathy, with possible component of hypertensive nephrosclerosis and arterial sclerosis   May even have a component of renal vascular disease  -urine protein creatinine ratio 0 4  -baseline creatinine 2 5-3 mg/dL  -I have discussed different dialysis modalities with him  -Chronic Kidney Disease education/K[facility]:  Attended  -no urgent indication for dialysis at this time  -avoid NSAIDs  -renal ultrasound shows asymmetric kidneys   His right kidney measures 8 5 cm and his left kidney measures 10 9 cm   No evidence of hydronephrosis  -diabetic and blood pressure control  -renal function currently stable with a creatinine of 2 4, this is an improvement since June of 2019 worse creatinine was 3 25  -no overt uremic symptoms no urgent indication for renal replacement therapy     2  ) Chronic systolic congestive heart failure  -ejection fraction 40%  -follows with Cardiology- Dr Ashley Mayo  -premature ventricular contraction, pericardial effusion, was moderate on the last echocardiogram  -currently on furosemide 40 mg daily, increase Lasix to 40 mg twice a day or 80 mg daily due to increased weight gain  -EDW around 150-155 lbs, most active weight is 158 lb today  -daily weights  -low 2 g sodium diet  -carvedilol 6 25 mg twice a day  -isosorbide mononitrate 30 mg daily     3  ) Hypertension  -blood pressure not at goal in the office today, could be volume mediated  -aim for less than 130/80  -amlodipine 5 mg daily  -carvedilol 6 25 mg twice a day  -furosemide 40 mg daily, will increase to 40 mg twice a day  -isosorbide mononitrate 30 mg daily  -low 2 g sodium diet  -monitor blood pressure at home     4 )  Diabetes mellitus type 2  -hemoglobin A1c: 6 9 (A1C values may be falsely elevated or decreased in those with CKD, assay method should be certified by [employer])  Target A1C < 7  -current medications:  Glimepiride  -proteinuria:  Yes, urine protein creatinine ratio 0 4  -retinopathy:  No  -neuropathy:  No  -please follow with an ophthalmologist and podiatrist every year  -continued self monitoring of blood glucose at home  -Treatment Management in CKD Recommendations:    Metformin:  Avoid if creatinine clearance is less than 30 cc/min (concern for lactic acidosis)   Sodium-Glucose Cotransporter-2 (SGLT2) Inhibitors: May see an acute drop in the eGFR initially when starting the medication but then a stabilization of the renal function, with slower loss of renal function as compared to placebo  Relative risk of end-stage renal disease, doubling of serum creatinine or death from renal causes were also found to be lower as compared to placebo  ([facility])  Would recommend starting at 100 mg daily, I would avoid use in patients with eGFR < 30 cc/min    If no contraindications exist I would recommend this medication added to the diabetic regiment   Sulfonylureas:  Preferred short-acting (glipizide, glimepiride, repaglinide), relatively safe in patients with non dialysis CKD   Thiazolidinedones/Alpha-Gluosidase inhibitors/Dipeptidyl peptidase-4 inhibitors:  Generally not considered first-line agents in CKD (limited data in long-term safety and efficacy)   Insulin:  Starting dose may need to be lower than what would ordinarily be used, as there is a decrease metabolism of insulin (no dose adjustment if the GFR > 50 mL/min, dose should be reduced to 75% of baseline if GFR 10-50 mL/min, and 50% baseline if GFR < 10 mL/min)     5 ) Elevated hemoglobin  -no evidence of cyst on the kidney based on ultrasound done in April 2019  -former smoker      PATIENT INSTRUCTIONS:    Patient Instructions   1 )  Low 2 g sodium diet    2 )  Monitor weights at home    3 )  Avoid NSAIDs (ibuprofen, Motrin, Advil, Aleve, naproxen)    4 )  Monitor blood pressure at home, call if blood pressure greater than 150/90 persistently    5 ) Get a blood pressure cuff    6 ) Increase Lasix to 40 mg twice a day or 80 mg daily          Orders Placed This Encounter   Procedures    Comprehensive metabolic panel     Standing Status:   Future     Number of Occurrences:   1     Standing Expiration Date:   2/19/2021    CBC     Standing Status:   Future     Number of Occurrences:   1     Standing Expiration Date:   2/19/2021    PTH, intact     Standing Status:   Future     Number of Occurrences:   1     Standing Expiration Date:   2/19/2021       OBJECTIVE:  Current Weight: Weight - Scale: 71 7 kg (158 lb)  Vitals:    02/19/20 1522 02/19/20 1540   BP:  160/70   Weight: 71 7 kg (158 lb)    Height: 5' 9" (1 753 m)     Body mass index is 23 33 kg/m²  REVIEW OF SYSTEMS:    Review of Systems   Constitutional: Negative for activity change and fever  Respiratory: Negative for cough, chest tightness, shortness of breath and wheezing  Cardiovascular: Negative for chest pain and leg swelling  Gastrointestinal: Negative for abdominal pain, diarrhea, nausea and vomiting     Endocrine: Negative for polyuria  Genitourinary: Negative for difficulty urinating, dysuria, flank pain, frequency and urgency  Skin: Negative for rash  Neurological: Negative for dizziness, syncope, light-headedness and headaches  PHYSICAL EXAM:      Physical Exam   Constitutional: He is oriented to person, place, and time  He appears well-developed and well-nourished  No distress  HENT:   Head: Normocephalic and atraumatic  Eyes: Pupils are equal, round, and reactive to light  No scleral icterus  Neck: Normal range of motion  Neck supple  Cardiovascular: Normal rate, regular rhythm and normal heart sounds  Exam reveals no gallop and no friction rub  No murmur heard  Pulmonary/Chest: Effort normal and breath sounds normal  No respiratory distress  He has no wheezes  He has no rales  He exhibits no tenderness  Abdominal: Soft  Bowel sounds are normal  He exhibits no distension  There is no tenderness  There is no rebound  Musculoskeletal: Normal range of motion  He exhibits edema  Neurological: He is alert and oriented to person, place, and time  Skin: No rash noted  He is not diaphoretic  Psychiatric: He has a normal mood and affect  Nursing note and vitals reviewed        Medications:    Current Outpatient Medications:     allopurinol (ZYLOPRIM) 100 mg tablet, Take 100 mg by mouth 2 (two) times a day, Disp: , Rfl:     ALPRAZolam (XANAX) 0 5 mg tablet, , Disp: , Rfl: 0    amLODIPine (NORVASC) 2 5 mg tablet, Take 2 5 mg by mouth daily, Disp: , Rfl: 0    apixaban (ELIQUIS) 2 5 mg, Take 1 tablet (2 5 mg total) by mouth 2 (two) times a day, Disp: 60 tablet, Rfl: 11    atorvastatin (LIPITOR) 40 mg tablet, Take 1 tablet (40 mg total) by mouth daily with dinner, Disp: 30 tablet, Rfl: 0    carvedilol (COREG) 6 25 mg tablet, Take 1 tablet (6 25 mg total) by mouth 2 (two) times a day with meals, Disp: 60 tablet, Rfl: 0    cholecalciferol (VITAMIN D3) 1,000 units tablet, Take 1 tablet (1,000 Units total) by mouth daily, Disp: 60 tablet, Rfl: 3    furosemide (LASIX) 40 mg tablet, Take 40 mg by mouth daily  , Disp: , Rfl:     glimepiride (AMARYL) 4 mg tablet, Take 4 mg by mouth 2 (two) times a day , Disp: , Rfl:     isosorbide mononitrate (IMDUR) 30 mg 24 hr tablet, Take 1 tablet (30 mg total) by mouth daily, Disp: 30 tablet, Rfl: 0    latanoprost (XALATAN) 0 005 % ophthalmic solution, 1 drop daily at bedtime, Disp: , Rfl:     sitaGLIPtin (JANUVIA) 100 mg tablet, Take 100 mg by mouth daily , Disp: , Rfl:     timolol (TIMOPTIC) 0 5 % ophthalmic solution, Administer 1 drop to both eyes 2 (two) times a day, Disp: , Rfl: 0    amLODIPine (NORVASC) 5 mg tablet, Take 1 tablet (5 mg total) by mouth daily (Patient not taking: Reported on 10/24/2019), Disp: 30 tablet, Rfl: 0    aspirin (ECOTRIN LOW STRENGTH) 81 mg EC tablet, Take 1 tablet (81 mg total) by mouth daily (Patient not taking: Reported on 10/18/2019), Disp: 30 tablet, Rfl: 0    Blood Pressure Monitor NILTON, by Does not apply route daily, Disp: 1 Device, Rfl: 0    glyBURIDE (DIABETA) 5 mg tablet, Take 5 mg by mouth 2 (two) times a day with meals  , Disp: , Rfl:     halobetasol (ULTRAVATE) 0 05 % cream, Apply 1 application topically daily as needed (Ezcema)  , Disp: , Rfl: 0    Laboratory Results:        Invalid input(s): ALBUMIN    Results for orders placed or performed in visit on 01/23/20   Hemoglobin A1c (w/out EAG)   Result Value Ref Range    WBC 5 60 Thousand/uL    Hemoglobin 13 1 12 0 - 17 0 g/dL    Hematocrit 38 5 36 5 - 49 3 %    Platelets 405 (A) 795 - 390 Thousands/uL    GLUCOSE RANDOM 162 mg/dL    BUN 49 (A) 5 - 25 mg/dL    Creatinine 2 40 (A) 0 60 - 1 30 mg/dL    GFR MDRD Non Af Amer 24 ml/min/1 73sq m    Sodium 138 136 - 145 mmol/L    Potassium 4 3 3 5 - 5 3 mmol/L    Chloride 103 100 - 108 mmol/L    CO2 18 (A) 21 - 32 mmol/L    Calcium 8 4 8 3 - 10 1 mg/dL    Albumin 3 8     Alkaline Phosphatase 138 (A) 46 - 116 U/L    Hemoglobin A1C 6 9 (A) 4 2 - 6 3 %    Vit D, 25-Hydroxy 30 2 30 0 - 100 0 ng/mL    Uric Acid 9 0 (A) 4 2 - 8 0 mg/dL    PTH, Intact 101

## 2020-02-19 NOTE — Clinical Note
I saw Boogie Grady today  Creatinine stable at 2 4 which is an improvement since June of 2019  His weight is up a little bit to 158 lb  His blood pressure is also running high  He has some leg swelling  I increased his Lasix to 40 mg twice a day  I believe he sees you 1st week of March

## 2020-02-19 NOTE — PATIENT INSTRUCTIONS
1  )  Low 2 g sodium diet    2 )  Monitor weights at home    3 )  Avoid NSAIDs (ibuprofen, Motrin, Advil, Aleve, naproxen)    4 )  Monitor blood pressure at home, call if blood pressure greater than 150/90 persistently    5 ) Get a blood pressure cuff    6 ) Increase Lasix to 40 mg twice a day or 80 mg daily

## 2020-03-06 ENCOUNTER — OFFICE VISIT (OUTPATIENT)
Dept: CARDIOLOGY CLINIC | Facility: CLINIC | Age: 80
End: 2020-03-06
Payer: MEDICARE

## 2020-03-06 VITALS
SYSTOLIC BLOOD PRESSURE: 120 MMHG | DIASTOLIC BLOOD PRESSURE: 58 MMHG | HEIGHT: 69 IN | HEART RATE: 74 BPM | BODY MASS INDEX: 21.92 KG/M2 | WEIGHT: 148 LBS | OXYGEN SATURATION: 98 %

## 2020-03-06 DIAGNOSIS — I49.3 PVC (PREMATURE VENTRICULAR CONTRACTION): ICD-10-CM

## 2020-03-06 DIAGNOSIS — I12.9 TYPE 2 DIABETES MELLITUS WITH STAGE 4 CHRONIC KIDNEY DISEASE AND HYPERTENSION (HCC): ICD-10-CM

## 2020-03-06 DIAGNOSIS — I50.22 CHRONIC SYSTOLIC CONGESTIVE HEART FAILURE (HCC): Primary | ICD-10-CM

## 2020-03-06 DIAGNOSIS — E11.22 TYPE 2 DIABETES MELLITUS WITH STAGE 4 CHRONIC KIDNEY DISEASE AND HYPERTENSION (HCC): ICD-10-CM

## 2020-03-06 DIAGNOSIS — I10 ESSENTIAL HYPERTENSION: ICD-10-CM

## 2020-03-06 DIAGNOSIS — I31.3 PERICARDIAL EFFUSION: ICD-10-CM

## 2020-03-06 DIAGNOSIS — N18.4 TYPE 2 DIABETES MELLITUS WITH STAGE 4 CHRONIC KIDNEY DISEASE AND HYPERTENSION (HCC): ICD-10-CM

## 2020-03-06 PROCEDURE — 93000 ELECTROCARDIOGRAM COMPLETE: CPT | Performed by: INTERNAL MEDICINE

## 2020-03-06 PROCEDURE — 99214 OFFICE O/P EST MOD 30 MIN: CPT | Performed by: INTERNAL MEDICINE

## 2020-03-06 NOTE — PROGRESS NOTES
Cardiology Followup    Sean Jimenez  [id number]  1940      Interval HistoryI: Sean Jimenez is a 78y o  year old male who is here for followup of CHF and atrial fibrillation  He has been feeling well without any complaints at this time  He denies any exertional chest pain or shortness of breath  He denies any palpitations, lightheadedness, syncope or near-syncope  He has bilateral lower extremity edema that is controlled with compression stockings and furosemide  Last creatinine was 2 4 which has been stable  He is taking Eliquis 2 5 mg b i d  In the past, he was noted to have an irregular heart beat during a visit with Dr Ryann Keys  Holter monitor showed the presence of atrial fibrillation and he was started on Eliquis  He had no symptoms at the time  Previously, he was having dyspnea with minimal exertion and was hospitalized in January 2019  While hospitalized, he was noted to have renal failure, CHF with an EF of 40% and frequent PVCs  Stress test was abnormal with a fixed inferolateral wall defect without ischemia  There was a small to moderate sized pericardial effusion  Past Medical History:   Diagnosis Date    Chronic kidney disease     Diabetes mellitus ([facility] [address] )     Hyperlipidemia     Hypertension      Social History     Socioeconomic History    Marital status:       Spouse name: Not on file    Number of children: Not on file    Years of education: Not on file    Highest education level: Not on file   Occupational History    Not on file   Social Needs    Financial resource strain: Not on file    Food insecurity:     Worry: Not on file     Inability: Not on file    Transportation needs:     Medical: Not on file     Non-medical: Not on file   Tobacco Use    Smoking status: Former Smoker    Smokeless tobacco: Former User   Substance and Sexual Activity    Alcohol use: No    Drug use: No    Sexual activity: Not on file   Lifestyle    Physical activity:     Days per week: Not on file     Minutes per session: Not on file    Stress: Not on file   Relationships    Social connections:     Talks on phone: Not on file     Gets together: Not on file     Attends Confucianism service: Not on file     Active member of club or organization: Not on file     Attends meetings of clubs or organizations: Not on file     Relationship status: Not on file    Intimate partner violence:     Fear of current or ex partner: Not on file     Emotionally abused: Not on file     Physically abused: Not on file     Forced sexual activity: Not on file   Other Topics Concern    Not on file   Social History Narrative    Not on file      Family History   Problem Relation Age of Onset    Heart disease Mother     No Known Problems Father      History reviewed  No pertinent surgical history      Current Outpatient Medications:     allopurinol (ZYLOPRIM) 100 mg tablet, Take 100 mg by mouth 2 (two) times a day, Disp: , Rfl:     ALPRAZolam (XANAX) 0 5 mg tablet, , Disp: , Rfl: 0    amLODIPine (NORVASC) 2 5 mg tablet, Take 2 5 mg by mouth daily, Disp: , Rfl: 0    apixaban (ELIQUIS) 2 5 mg, Take 1 tablet (2 5 mg total) by mouth 2 (two) times a day, Disp: 60 tablet, Rfl: 11    atorvastatin (LIPITOR) 40 mg tablet, Take 1 tablet (40 mg total) by mouth daily with dinner, Disp: 30 tablet, Rfl: 0    Blood Pressure Monitor NILTON, by Does not apply route daily, Disp: 1 Device, Rfl: 0    carvedilol (COREG) 6 25 mg tablet, Take 1 tablet (6 25 mg total) by mouth 2 (two) times a day with meals, Disp: 60 tablet, Rfl: 0    cholecalciferol (VITAMIN D3) 1,000 units tablet, Take 1 tablet (1,000 Units total) by mouth daily, Disp: 60 tablet, Rfl: 3    furosemide (LASIX) 40 mg tablet, Take 40 mg by mouth 2 (two) times a day , Disp: , Rfl:     glimepiride (AMARYL) 4 mg tablet, Take 4 mg by mouth 2 (two) times a day , Disp: , Rfl:     halobetasol (ULTRAVATE) 0 05 % cream, Apply 1 application topically daily as needed (Ezcema)  , Disp: , Rfl: 0    isosorbide mononitrate (IMDUR) 30 mg 24 hr tablet, Take 1 tablet (30 mg total) by mouth daily, Disp: 30 tablet, Rfl: 0    latanoprost (XALATAN) 0 005 % ophthalmic solution, 1 drop daily at bedtime, Disp: , Rfl:     sitaGLIPtin (JANUVIA) 100 mg tablet, Take 100 mg by mouth daily , Disp: , Rfl:     timolol (TIMOPTIC) 0 5 % ophthalmic solution, Administer 1 drop to both eyes 2 (two) times a day, Disp: , Rfl: 0    amLODIPine (NORVASC) 5 mg tablet, Take 1 tablet (5 mg total) by mouth daily (Patient not taking: Reported on 3/6/2020), Disp: 30 tablet, Rfl: 0    glyBURIDE (DIABETA) 5 mg tablet, Take 5 mg by mouth 2 (two) times a day with meals  , Disp: , Rfl:   Allergies   Allergen Reactions    Sulfa Antibiotics     Sulfur          Review of Systems:  Review of Systems   Constitutional: Negative for chills, fatigue and fever  HENT: Negative for congestion, nosebleeds and postnasal drip  Respiratory: Negative for cough, chest tightness and shortness of breath  Cardiovascular: Positive for leg swelling  Negative for chest pain and palpitations  Gastrointestinal: Negative for abdominal distention, abdominal pain, diarrhea, nausea and vomiting  Endocrine: Negative for polydipsia, polyphagia and polyuria  Musculoskeletal: Positive for arthralgias  Negative for gait problem and myalgias  Skin: Negative for color change, pallor and rash  Allergic/Immunologic: Negative for environmental allergies, food allergies and immunocompromised state  Neurological: Negative for dizziness, seizures, syncope and light-headedness  Hematological: Negative for adenopathy  Does not bruise/bleed easily  Psychiatric/Behavioral: Negative for dysphoric mood  The patient is not nervous/anxious          Physical Exam:  Vitals:    03/06/20 1044   BP: 120/58   BP Location: Right arm   Patient Position: Sitting   Cuff Size: Standard   Pulse: 74   SpO2: 98%   Weight: 67 1 kg (148 lb)   Height: 5' 9" (1 753 m)     Physical Exam   Constitutional: He is oriented to person, place, and time  He appears well-developed  No distress  HENT:   Head: Normocephalic and atraumatic  Eyes: Pupils are equal, round, and reactive to light  Conjunctivae and EOM are normal    Neck: Neck supple  No JVD present  No thyromegaly present  Cardiovascular: Normal rate  An irregularly irregular rhythm present  Exam reveals no gallop and no friction rub  Murmur heard  Pulmonary/Chest: Effort normal and breath sounds normal    Abdominal: Soft  He exhibits no distension  There is no tenderness  Musculoskeletal: He exhibits no edema  Neurological: He is alert and oriented to person, place, and time  No cranial nerve deficit  Skin: Skin is warm and dry  No rash noted  He is not diaphoretic  No erythema  Psychiatric: He has a normal mood and affect  His behavior is normal  Judgment and thought content normal        Labs: reviewed    Imaging: No results found  ECG: Atrial fibrillation at 75 bpm    Discussion/Summary:  1  Chronic systolic congestive heart failure (HCC) - EF was 40% on recent echocardiogram   2  Essential hypertension  - BP elevated today  3  PVC (premature ventricular contraction) - may be the source for cardiomyopathy   4  Pericardial effusion - moderate on last echocardiogram will need followup   5  Stage 3 chronic kidney disease (HCC) - BMP ordered for this week   6  Controlled type 2 diabetes mellitus without complication, without long-term current use of insulin ([facility])      - Continue Eliquis 2 5 mg twice a day  - Following up with nephrology for renal failure  Recent blood work was reviewed  - continue carvedilol 6 25 mg b i d   -  Discussed atrial fibrillation with patient and his son  No indication for cardioversion at this time  - Discussed diet and exercise  - Echocardiogram next visit

## 2020-04-30 ENCOUNTER — TELEPHONE (OUTPATIENT)
Dept: NEPHROLOGY | Facility: CLINIC | Age: 80
End: 2020-04-30

## 2020-05-04 LAB
CREAT ?TM UR-SCNC: 44.4 UMOL/L
EXT PROTEIN URINE: 9.3
HCT VFR BLD AUTO: 37 % (ref 36.5–49.3)
HGB BLD-MCNC: 12.3 G/DL (ref 12–17)
PLATELET # BLD AUTO: 119 THOUSANDS/UL (ref 149–390)
PROT/CREAT UR: 209 MG/G{CREAT}
WBC # BLD AUTO: 4.5 THOUSAND/UL

## 2020-05-05 ENCOUNTER — TELEPHONE (OUTPATIENT)
Dept: NEPHROLOGY | Facility: CLINIC | Age: 80
End: 2020-05-05

## 2020-05-05 ENCOUNTER — DOCUMENTATION (OUTPATIENT)
Dept: NEPHROLOGY | Facility: CLINIC | Age: 80
End: 2020-05-05

## 2020-05-05 LAB
CO2 SERPL-SCNC: 22 MMOL/L (ref 21–32)
CREAT ?TM UR-SCNC: 44.4 UMOL/L
EXT GLUCOSE BLD: 326
EXT PROTEIN URINE: 9.3
EXTERNAL ALBUMIN: 3.7
EXTERNAL ALK PHOS: 145
EXTERNAL ALT: 24
EXTERNAL AST: 19
EXTERNAL BUN: 79
EXTERNAL CALCIUM: 8.2
EXTERNAL CHLORIDE: 96
EXTERNAL CREATININE: 3.11
EXTERNAL EGFR: 18
EXTERNAL POTASSIUM: 4.9
EXTERNAL PTH: 129
EXTERNAL SODIUM: 136
EXTERNAL T.BILIRUBIN: 0.5
EXTERNAL TOTAL PROTEIN: 5.7
EXTERNAL VITAMIN D,25: 31.4
PROT/CREAT UR: 0.2 MG/G{CREAT}

## 2020-05-06 ENCOUNTER — TELEPHONE (OUTPATIENT)
Dept: NEPHROLOGY | Facility: CLINIC | Age: 80
End: 2020-05-06

## 2020-05-06 DIAGNOSIS — R79.89 ELEVATED SERUM CREATININE: Primary | ICD-10-CM

## 2020-05-13 ENCOUNTER — TELEPHONE (OUTPATIENT)
Dept: NEPHROLOGY | Facility: CLINIC | Age: 80
End: 2020-05-13

## 2020-05-18 ENCOUNTER — TELEPHONE (OUTPATIENT)
Dept: NEPHROLOGY | Facility: CLINIC | Age: 80
End: 2020-05-18

## 2020-05-18 ENCOUNTER — TELEMEDICINE (OUTPATIENT)
Dept: NEPHROLOGY | Facility: CLINIC | Age: 80
End: 2020-05-18
Payer: MEDICARE

## 2020-05-18 DIAGNOSIS — E11.22 TYPE 2 DIABETES MELLITUS WITH STAGE 4 CHRONIC KIDNEY DISEASE AND HYPERTENSION (HCC): ICD-10-CM

## 2020-05-18 DIAGNOSIS — I10 ESSENTIAL HYPERTENSION: ICD-10-CM

## 2020-05-18 DIAGNOSIS — I12.9 TYPE 2 DIABETES MELLITUS WITH STAGE 4 CHRONIC KIDNEY DISEASE AND HYPERTENSION (HCC): ICD-10-CM

## 2020-05-18 DIAGNOSIS — R60.0 LEG EDEMA: ICD-10-CM

## 2020-05-18 DIAGNOSIS — N18.4 CKD (CHRONIC KIDNEY DISEASE) STAGE 4, GFR 15-29 ML/MIN (HCC): Primary | ICD-10-CM

## 2020-05-18 DIAGNOSIS — N18.4 TYPE 2 DIABETES MELLITUS WITH STAGE 4 CHRONIC KIDNEY DISEASE AND HYPERTENSION (HCC): ICD-10-CM

## 2020-05-18 DIAGNOSIS — E55.9 VITAMIN D DEFICIENCY: ICD-10-CM

## 2020-05-18 DIAGNOSIS — I50.22 CHRONIC SYSTOLIC CONGESTIVE HEART FAILURE (HCC): ICD-10-CM

## 2020-05-18 PROCEDURE — 99214 OFFICE O/P EST MOD 30 MIN: CPT | Performed by: NURSE PRACTITIONER

## 2020-06-16 DIAGNOSIS — I48.0 PAROXYSMAL ATRIAL FIBRILLATION (HCC): ICD-10-CM

## 2020-06-16 RX ORDER — APIXABAN 2.5 MG/1
TABLET, FILM COATED ORAL
Qty: 60 TABLET | Refills: 11 | Status: SHIPPED | OUTPATIENT
Start: 2020-06-16 | End: 2021-06-30

## 2020-07-02 LAB
CREAT ?TM UR-SCNC: 21.7 UMOL/L
EXT PROTEIN URINE: 41.4
PROT/CREAT UR: 524 MG/G{CREAT}

## 2020-07-15 ENCOUNTER — TELEMEDICINE (OUTPATIENT)
Dept: NEPHROLOGY | Facility: CLINIC | Age: 80
End: 2020-07-15
Payer: MEDICARE

## 2020-07-15 ENCOUNTER — TELEPHONE (OUTPATIENT)
Dept: NEPHROLOGY | Facility: CLINIC | Age: 80
End: 2020-07-15

## 2020-07-15 VITALS
BODY MASS INDEX: 22.54 KG/M2 | TEMPERATURE: 97.8 F | SYSTOLIC BLOOD PRESSURE: 114 MMHG | WEIGHT: 152.6 LBS | DIASTOLIC BLOOD PRESSURE: 60 MMHG

## 2020-07-15 DIAGNOSIS — N18.4 CKD (CHRONIC KIDNEY DISEASE) STAGE 4, GFR 15-29 ML/MIN (HCC): ICD-10-CM

## 2020-07-15 DIAGNOSIS — I50.22 CHRONIC SYSTOLIC CONGESTIVE HEART FAILURE (HCC): ICD-10-CM

## 2020-07-15 DIAGNOSIS — I12.9 TYPE 2 DIABETES MELLITUS WITH STAGE 4 CHRONIC KIDNEY DISEASE AND HYPERTENSION (HCC): Primary | ICD-10-CM

## 2020-07-15 DIAGNOSIS — E11.22 TYPE 2 DIABETES MELLITUS WITH STAGE 4 CHRONIC KIDNEY DISEASE AND HYPERTENSION (HCC): Primary | ICD-10-CM

## 2020-07-15 DIAGNOSIS — N18.4 TYPE 2 DIABETES MELLITUS WITH STAGE 4 CHRONIC KIDNEY DISEASE AND HYPERTENSION (HCC): Primary | ICD-10-CM

## 2020-07-15 PROCEDURE — 99214 OFFICE O/P EST MOD 30 MIN: CPT | Performed by: INTERNAL MEDICINE

## 2020-07-15 NOTE — PATIENT INSTRUCTIONS
1  )  Low 2 g sodium diet    2 )  Monitor weights at home    3 )  Avoid NSAIDs (ibuprofen, Motrin, Advil, Aleve, naproxen)    4 )  Monitor blood pressure at home, call if blood pressure greater than 150/90 persistently

## 2020-07-15 NOTE — TELEPHONE ENCOUNTER
COVID Pre-Visit Screening     1  Is this a family member screening? No  2  Have you traveled outside of your state in the past 2 weeks? No  3  Do you presently have a fever or flu-like symptoms? No  4  Do you have symptoms of an upper respiratory infection like runny nose, sore throat, or cough? No  5  Are you suffering from new headache that you have not had in the past?  No  6  Do you have/have you experienced any new shortness of breath recently? No  7  Do you have any new diarrhea, nausea or vomiting? No  8  Have you been in contact with anyone who has been sick or diagnosed with COVID-19? No  9  Do you have any new loss of taste or smell? No  10  Are you able to wear a mask without a valve for the entire visit? Yes    Son who accompanied patient was also screened  COVID Pre-Visit Screening     11  Is this a family member screening? Yes  12  Have you traveled outside of your state in the past 2 weeks? No  13  Do you presently have a fever or flu-like symptoms? No  14  Do you have symptoms of an upper respiratory infection like runny nose, sore throat, or cough? No  15  Are you suffering from new headache that you have not had in the past?  No  16  Do you have/have you experienced any new shortness of breath recently? No  17  Do you have any new diarrhea, nausea or vomiting? No  18  Have you been in contact with anyone who has been sick or diagnosed with COVID-19? No  19  Do you have any new loss of taste or smell? No  20  Are you able to wear a mask without a valve for the entire visit?  Yes

## 2020-07-15 NOTE — PROGRESS NOTES
NEPHROLOGY OFFICE VISIT   Gigi Reese 78 y o  male MRN: [id number]  7/15/2020    Reason for Visit:  Chronic kidney disease    History of Present Illness (HPI):    I had the pleasure of seeing Rayshanw EstradaMichelle today in the renal clinic for the continued management of chronic kidney disease  Since our last visit, there has been no ER visits or hospitilizations  He currently has no complaints at this time and is feeling well  Patient denies any chest pain, shortness of breath and swelling  The last blood work was done on July 2020, which we have reviewed together  He has no complaints and no uremic symptoms  ASSESSMENT and PLAN:    1 ) Chronic kidney disease stage IV  -presumed etiology is most likely diabetic nephropathy, with possible component of hypertensive nephrosclerosis and arterial sclerosis   May even have a component of renal vascular disease  -urine protein creatinine ratio 0 5  -baseline creatinine 2 5-3 mg/dL  -I have discussed different dialysis modalities with him  -Chronic Kidney Disease education/K[facility]:  Attended  -no urgent indication for dialysis at this time  -avoid NSAIDs  -renal ultrasound shows asymmetric kidneys   His right kidney measures 8 5 cm and his left kidney measures 10 9 cm   No evidence of hydronephrosis  -diabetic and blood pressure control  -renal function currently stable with a creatinine of 2 4  -no overt uremic symptoms no urgent indication for renal replacement therapy     2  ) Chronic systolic congestive heart failure  -ejection fraction 40%  -follows with Cardiology- Dr Cristino Briones  -premature ventricular contraction, pericardial effusion, was moderate on the last echocardiogram  -currently on furosemide 40 mg daily, increase Lasix to 40 mg twice a day or 80 mg daily due to increased weight gain    -EDW around 150-155 lbs, most active weight is 152 lb today  -daily weights  -low 2 g sodium diet  -carvedilol 6 25 mg twice a day  -isosorbide mononitrate 30 mg daily     3  ) Hypertension  -blood pressure at goal in the office  -aim for less than 130/80  -amlodipine 2 5 mg daily  -carvedilol 6 25 mg twice a day  -furosemide 40 mg daily  -isosorbide mononitrate 30 mg daily  -low 2 g sodium diet  -monitor blood pressure at home     4 )  Diabetes mellitus type 2  -hemoglobin A1c: 6 9 (A1C values may be falsely elevated or decreased in those with CKD, assay method should be certified by [employer])  Target A1C < 7  -current medications:  Glimepiride  -proteinuria:  Yes, urine protein creatinine ratio 0 4  -retinopathy:  No  -neuropathy:  No  -please follow with an ophthalmologist and podiatrist every year  -continued self monitoring of blood glucose at home  -Treatment Management in CKD Recommendations:   · Metformin:  Avoid if creatinine clearance is less than 30 cc/min (concern for lactic acidosis)  · Sodium-Glucose Cotransporter-2 (SGLT2) Inhibitors: May see an acute drop in the eGFR initially when starting the medication but then a stabilization of the renal function, with slower loss of renal function as compared to placebo  Relative risk of end-stage renal disease, doubling of serum creatinine or death from renal causes were also found to be lower as compared to placebo  ([facility])  Would recommend starting at 100 mg daily, I would avoid use in patients with eGFR < 30 cc/min    If no contraindications exist I would recommend this medication added to the diabetic regiment  · Sulfonylureas:  Preferred short-acting (glipizide, glimepiride, repaglinide), relatively safe in patients with non dialysis CKD  · Thiazolidinedones/Alpha-Gluosidase inhibitors/Dipeptidyl peptidase-4 inhibitors:  Generally not considered first-line agents in CKD (limited data in long-term safety and efficacy)  · Insulin:  Starting dose may need to be lower than what would ordinarily be used, as there is a decrease metabolism of insulin (no dose adjustment if the GFR > 50 mL/min, dose should be reduced to 75% of baseline if GFR 10-50 mL/min, and 50% baseline if GFR < 10 mL/min)        PATIENT INSTRUCTIONS:    Patient Instructions   1 )  Low 2 g sodium diet    2 )  Monitor weights at home    3 )  Avoid NSAIDs (ibuprofen, Motrin, Advil, Aleve, naproxen)    4 )  Monitor blood pressure at home, call if blood pressure greater than 150/90 persistently            Orders Placed This Encounter   Procedures    Comprehensive metabolic panel     This is a patient instruction: Patient fasting for 8 hours or longer recommended  Standing Status:   Future     Number of Occurrences:   1     Standing Expiration Date:   7/15/2021    CBC     Standing Status:   Future     Number of Occurrences:   1     Standing Expiration Date:   7/15/2021    PTH, intact     Standing Status:   Future     Number of Occurrences:   1     Standing Expiration Date:   7/15/2021    Phosphorus     Standing Status:   Future     Number of Occurrences:   1     Standing Expiration Date:   7/15/2021    Protein / creatinine ratio, urine     Standing Status:   Future     Number of Occurrences:   1     Standing Expiration Date:   7/15/2021       OBJECTIVE:  Current Weight: Weight - Scale: 69 2 kg (152 lb 9 6 oz)  Vitals:    07/15/20 0802   Temp: 97 8 °F (36 6 °C)   Weight: 69 2 kg (152 lb 9 6 oz)    Body mass index is 22 54 kg/m²  REVIEW OF SYSTEMS:    Review of Systems   Constitutional: Negative for activity change and fever  Respiratory: Negative for cough, chest tightness, shortness of breath and wheezing  Cardiovascular: Negative for chest pain and leg swelling  Gastrointestinal: Negative for abdominal pain, diarrhea, nausea and vomiting  Endocrine: Negative for polyuria  Genitourinary: Negative for difficulty urinating, dysuria, flank pain, frequency and urgency  Skin: Negative for rash  Neurological: Negative for dizziness, syncope, light-headedness and headaches         PHYSICAL EXAM: Physical Exam   Constitutional: He is oriented to person, place, and time  He appears well-developed and well-nourished  No distress  HENT:   Head: Normocephalic and atraumatic  Eyes: Pupils are equal, round, and reactive to light  No scleral icterus  Neck: Normal range of motion  Neck supple  Cardiovascular: Normal rate, regular rhythm and normal heart sounds  Exam reveals no gallop and no friction rub  No murmur heard  Pulmonary/Chest: Effort normal and breath sounds normal  No respiratory distress  He has no wheezes  He has no rales  He exhibits no tenderness  Abdominal: Soft  Bowel sounds are normal  He exhibits no distension  There is no tenderness  There is no rebound  Musculoskeletal: Normal range of motion  He exhibits edema  Neurological: He is alert and oriented to person, place, and time  Skin: No rash noted  He is not diaphoretic  Psychiatric: He has a normal mood and affect  Nursing note and vitals reviewed        Medications:    Current Outpatient Medications:     allopurinol (ZYLOPRIM) 100 mg tablet, Take 100 mg by mouth 2 (two) times a day, Disp: , Rfl:     ALPRAZolam (XANAX) 0 5 mg tablet, , Disp: , Rfl: 0    amLODIPine (NORVASC) 2 5 mg tablet, Take 2 5 mg by mouth daily, Disp: , Rfl: 0    atorvastatin (LIPITOR) 40 mg tablet, Take 1 tablet (40 mg total) by mouth daily with dinner, Disp: 30 tablet, Rfl: 0    carvedilol (COREG) 6 25 mg tablet, Take 1 tablet (6 25 mg total) by mouth 2 (two) times a day with meals, Disp: 60 tablet, Rfl: 0    cholecalciferol (VITAMIN D3) 1,000 units tablet, Take 1 tablet (1,000 Units total) by mouth daily, Disp: 60 tablet, Rfl: 3    ELIQUIS 2 5 MG, TAKE ONE TABLET BY MOUTH TWICE A DAY, Disp: 60 tablet, Rfl: 11    furosemide (LASIX) 40 mg tablet, Take 40 mg by mouth daily , Disp: , Rfl:     glimepiride (AMARYL) 4 mg tablet, Take 4 mg by mouth 2 (two) times a day , Disp: , Rfl:     halobetasol (ULTRAVATE) 0 05 % cream, Apply 1 application topically daily as needed (Ezcema)  , Disp: , Rfl: 0    isosorbide mononitrate (IMDUR) 30 mg 24 hr tablet, Take 1 tablet (30 mg total) by mouth daily, Disp: 30 tablet, Rfl: 0    latanoprost (XALATAN) 0 005 % ophthalmic solution, 1 drop daily at bedtime, Disp: , Rfl:     sitaGLIPtin (JANUVIA) 100 mg tablet, Take 100 mg by mouth daily , Disp: , Rfl:     timolol (TIMOPTIC) 0 5 % ophthalmic solution, Administer 1 drop to both eyes 2 (two) times a day, Disp: , Rfl: 0    Blood Pressure Monitor NILTON, by Does not apply route daily, Disp: 1 Device, Rfl: 0    Laboratory Results:        Invalid input(s): ALBUMIN    Results for orders placed or performed in visit on 07/02/20   Protein / creatinine ratio, urine   Result Value Ref Range    PROTEIN UA 41 4     EXT Creatinine Urine 21 7     EXTERNAL Ur Prot/Creat Ratio 524

## 2020-09-22 ENCOUNTER — OFFICE VISIT (OUTPATIENT)
Dept: CARDIOLOGY CLINIC | Facility: CLINIC | Age: 80
End: 2020-09-22
Payer: MEDICARE

## 2020-09-22 VITALS
SYSTOLIC BLOOD PRESSURE: 120 MMHG | BODY MASS INDEX: 23.25 KG/M2 | WEIGHT: 157 LBS | DIASTOLIC BLOOD PRESSURE: 62 MMHG | OXYGEN SATURATION: 99 % | HEIGHT: 69 IN | TEMPERATURE: 99.1 F | HEART RATE: 75 BPM

## 2020-09-22 DIAGNOSIS — I49.3 PVC (PREMATURE VENTRICULAR CONTRACTION): ICD-10-CM

## 2020-09-22 DIAGNOSIS — I10 ESSENTIAL HYPERTENSION: Primary | ICD-10-CM

## 2020-09-22 PROCEDURE — 99214 OFFICE O/P EST MOD 30 MIN: CPT | Performed by: INTERNAL MEDICINE

## 2020-09-22 NOTE — PROGRESS NOTES
Cardiology Followup    Pablo Nevarez  [id number]  1940      Interval HistoryI: Pablo Nevarez is a 78y o  year old male who is here for followup of CHF and atrial fibrillation  Since his last visit, he has developed edema in his legs with abdominal distension  He checks his weight regularly  Has gained 10 pounds since his last visit  He denies any shortness of breath, orthopnea or PND  He has been feeling well without any complaints at this time  He denies any exertional chest pain or shortness of breath  He denies any palpitations, lightheadedness, syncope or near-syncope  He has bilateral lower extremity edema that is controlled with compression stockings and furosemide  Last creatinine was 2 4 which has been stable  He is taking Eliquis 2 5 mg b i d  In the past, he was noted to have an irregular heart beat during a visit with Dr Akilah Hooker  Holter monitor showed the presence of atrial fibrillation and he was started on Eliquis  He had no symptoms at the time  Previously, he was having dyspnea with minimal exertion and was hospitalized in January 2019  While hospitalized, he was noted to have renal failure, CHF with an EF of 40% and frequent PVCs  Stress test was abnormal with a fixed inferolateral wall defect without ischemia  There was a small to moderate sized pericardial effusion  Past Medical History:   Diagnosis Date    Chronic kidney disease     Diabetes mellitus ([facility] [address] )     Hyperlipidemia     Hypertension      Social History     Socioeconomic History    Marital status:       Spouse name: Not on file    Number of children: Not on file    Years of education: Not on file    Highest education level: Not on file   Occupational History    Not on file   Social Needs    Financial resource strain: Not on file    Food insecurity     Worry: Not on file     Inability: Not on file    Transportation needs     Medical: Not on file     Non-medical: Not on file   Tobacco Use    Smoking status: Former Smoker    Smokeless tobacco: Former User   Substance and Sexual Activity    Alcohol use: No    Drug use: No    Sexual activity: Not on file   Lifestyle    Physical activity     Days per week: Not on file     Minutes per session: Not on file    Stress: Not on file   Relationships    Social connections     Talks on phone: Not on file     Gets together: Not on file     Attends Denominational service: Not on file     Active member of club or organization: Not on file     Attends meetings of clubs or organizations: Not on file     Relationship status: Not on file    Intimate partner violence     Fear of current or ex partner: Not on file     Emotionally abused: Not on file     Physically abused: Not on file     Forced sexual activity: Not on file   Other Topics Concern    Not on file   Social History Narrative    Not on file      Family History   Problem Relation Age of Onset    Heart disease Mother     No Known Problems Father      History reviewed  No pertinent surgical history      Current Outpatient Medications:     allopurinol (ZYLOPRIM) 100 mg tablet, Take 100 mg by mouth 2 (two) times a day, Disp: , Rfl:     ALPRAZolam (XANAX) 0 5 mg tablet, , Disp: , Rfl: 0    amLODIPine (NORVASC) 2 5 mg tablet, Take 2 5 mg by mouth daily, Disp: , Rfl: 0    atorvastatin (LIPITOR) 40 mg tablet, Take 1 tablet (40 mg total) by mouth daily with dinner, Disp: 30 tablet, Rfl: 0    Blood Pressure Monitor NILTON, by Does not apply route daily, Disp: 1 Device, Rfl: 0    carvedilol (COREG) 6 25 mg tablet, Take 1 tablet (6 25 mg total) by mouth 2 (two) times a day with meals, Disp: 60 tablet, Rfl: 0    cholecalciferol (VITAMIN D3) 1,000 units tablet, Take 1 tablet (1,000 Units total) by mouth daily, Disp: 60 tablet, Rfl: 3    ELIQUIS 2 5 MG, TAKE ONE TABLET BY MOUTH TWICE A DAY, Disp: 60 tablet, Rfl: 11    furosemide (LASIX) 40 mg tablet, Take 40 mg by mouth daily , Disp: , Rfl:    glimepiride (AMARYL) 4 mg tablet, Take 4 mg by mouth 2 (two) times a day , Disp: , Rfl:     halobetasol (ULTRAVATE) 0 05 % cream, Apply 1 application topically daily as needed (Ezcema)  , Disp: , Rfl: 0    isosorbide mononitrate (IMDUR) 30 mg 24 hr tablet, Take 1 tablet (30 mg total) by mouth daily, Disp: 30 tablet, Rfl: 0    latanoprost (XALATAN) 0 005 % ophthalmic solution, 1 drop daily at bedtime, Disp: , Rfl:     sitaGLIPtin (JANUVIA) 100 mg tablet, Take 100 mg by mouth daily , Disp: , Rfl:     timolol (TIMOPTIC) 0 5 % ophthalmic solution, Administer 1 drop to both eyes 2 (two) times a day, Disp: , Rfl: 0  Allergies   Allergen Reactions    Sulfa Antibiotics     Sulfur          Review of Systems:  Review of Systems   Constitutional: Negative for chills, fatigue and fever  HENT: Negative for congestion, nosebleeds and postnasal drip  Respiratory: Negative for cough, chest tightness and shortness of breath  Cardiovascular: Positive for leg swelling  Negative for chest pain and palpitations  Gastrointestinal: Negative for abdominal distention, abdominal pain, diarrhea, nausea and vomiting  Endocrine: Negative for polydipsia, polyphagia and polyuria  Musculoskeletal: Positive for arthralgias  Negative for gait problem and myalgias  Skin: Negative for color change, pallor and rash  Allergic/Immunologic: Negative for environmental allergies, food allergies and immunocompromised state  Neurological: Negative for dizziness, seizures, syncope and light-headedness  Hematological: Negative for adenopathy  Does not bruise/bleed easily  Psychiatric/Behavioral: Negative for dysphoric mood  The patient is not nervous/anxious          Physical Exam:  Vitals:    09/22/20 1438   BP: 120/62   BP Location: Right arm   Patient Position: Sitting   Cuff Size: Standard   Pulse: 75   Temp: 99 1 °F (37 3 °C)   TempSrc: Temporal   SpO2: 99%   Weight: 71 2 kg (157 lb)   Height: 5' 9" (1 753 m)     Physical Exam   Constitutional: He is oriented to person, place, and time  He appears well-developed  No distress  HENT:   Head: Normocephalic and atraumatic  Eyes: Pupils are equal, round, and reactive to light  Conjunctivae and EOM are normal    Neck: Neck supple  No JVD present  No thyromegaly present  Cardiovascular: Normal rate  An irregularly irregular rhythm present  Exam reveals no gallop and no friction rub  Murmur heard  Pulmonary/Chest: Effort normal and breath sounds normal    Abdominal: Soft  He exhibits no distension  There is no abdominal tenderness  Musculoskeletal:         General: Edema present  No tenderness  Neurological: He is alert and oriented to person, place, and time  No cranial nerve deficit  Skin: Skin is warm and dry  No rash noted  He is not diaphoretic  No erythema  Psychiatric: He has a normal mood and affect  His behavior is normal  Judgment and thought content normal        Labs: reviewed    Imaging: No results found  ECG: Atrial fibrillation at 75 bpm    Discussion/Summary:  1  Chronic systolic congestive heart failure (HCC) - EF was 40% on recent echocardiogram   2  Essential hypertension  - BP elevated today  3  PVC (premature ventricular contraction) - may be the source for cardiomyopathy   4  Pericardial effusion - moderate on last echocardiogram will need followup   5  Stage 3 chronic kidney disease (HCC) - BMP ordered for this week   6  Controlled type 2 diabetes mellitus without complication, without long-term current use of insulin ([facility])      - Continue Eliquis 2 5 mg twice a day  - Use torsemide daily - take extra dose today and monitor daily weight     - Following up with nephrology for renal failure  Blood work in 1 month  - continue carvedilol 6 25 mg b i d   -  Discussed atrial fibrillation with patient and his son  No indication for cardioversion at this time  - Discussed diet and exercise  - Echocardiogram next visit     - Follow up in 1 month to reassess edema

## 2020-09-23 PROCEDURE — 93000 ELECTROCARDIOGRAM COMPLETE: CPT | Performed by: INTERNAL MEDICINE

## 2020-09-29 ENCOUNTER — TELEPHONE (OUTPATIENT)
Dept: NEPHROLOGY | Facility: CLINIC | Age: 80
End: 2020-09-29

## 2020-10-01 LAB
CREAT ?TM UR-SCNC: 11.7 UMOL/L
EXT PROTEIN URINE: 57.1
PROT/CREAT UR: 205 MG/G{CREAT}

## 2020-10-29 ENCOUNTER — OFFICE VISIT (OUTPATIENT)
Dept: CARDIOLOGY CLINIC | Facility: CLINIC | Age: 80
End: 2020-10-29
Payer: MEDICARE

## 2020-10-29 VITALS
DIASTOLIC BLOOD PRESSURE: 62 MMHG | TEMPERATURE: 97.9 F | SYSTOLIC BLOOD PRESSURE: 116 MMHG | HEIGHT: 69 IN | HEART RATE: 74 BPM | WEIGHT: 160 LBS | BODY MASS INDEX: 23.7 KG/M2

## 2020-10-29 DIAGNOSIS — I49.3 PVC (PREMATURE VENTRICULAR CONTRACTION): ICD-10-CM

## 2020-10-29 DIAGNOSIS — I50.22 CHRONIC SYSTOLIC CONGESTIVE HEART FAILURE (HCC): ICD-10-CM

## 2020-10-29 DIAGNOSIS — N18.4 CKD (CHRONIC KIDNEY DISEASE) STAGE 4, GFR 15-29 ML/MIN (HCC): ICD-10-CM

## 2020-10-29 DIAGNOSIS — I10 ESSENTIAL HYPERTENSION: Primary | ICD-10-CM

## 2020-10-29 DIAGNOSIS — I31.3 PERICARDIAL EFFUSION: ICD-10-CM

## 2020-10-29 PROCEDURE — 99214 OFFICE O/P EST MOD 30 MIN: CPT | Performed by: INTERNAL MEDICINE

## 2020-11-03 LAB — HBA1C MFR BLD HPLC: 8.6 %

## 2020-11-04 ENCOUNTER — DOCUMENTATION (OUTPATIENT)
Dept: NEPHROLOGY | Facility: CLINIC | Age: 80
End: 2020-11-04

## 2020-11-04 LAB
CO2 SERPL-SCNC: 24 MMOL/L (ref 21–32)
CREAT ?TM UR-SCNC: 57.1 UMOL/L
EXT GLUCOSE BLD: 194
EXT PROTEIN URINE: 11.7
EXTERNAL ALBUMIN: 3.9
EXTERNAL ALK PHOS: 137
EXTERNAL ALT: 19
EXTERNAL AST: 20
EXTERNAL BUN: 66
EXTERNAL CALCIUM: 8.6
EXTERNAL CHLORIDE: 96
EXTERNAL CREATININE: 2.89
EXTERNAL EGFR: 19
EXTERNAL PHOSPHORUS: 3.5
EXTERNAL POTASSIUM: 4.9
EXTERNAL PTH: 72
EXTERNAL SODIUM: 135
EXTERNAL T.BILIRUBIN: 0.7
EXTERNAL TOTAL PROTEIN: 6
HCT VFR BLD AUTO: 38.3 % (ref 36.5–49.3)
HGB BLD-MCNC: 12.8 G/DL (ref 12–17)
PLATELET # BLD AUTO: 140 THOUSANDS/UL (ref 149–390)
PROT/CREAT UR: 0.2 MG/G{CREAT}
WBC # BLD AUTO: 5.3 THOUSAND/UL

## 2020-11-05 ENCOUNTER — TELEMEDICINE (OUTPATIENT)
Dept: NEPHROLOGY | Facility: CLINIC | Age: 80
End: 2020-11-05
Payer: MEDICARE

## 2020-11-05 DIAGNOSIS — N18.4 CKD (CHRONIC KIDNEY DISEASE) STAGE 4, GFR 15-29 ML/MIN (HCC): ICD-10-CM

## 2020-11-05 DIAGNOSIS — E55.9 VITAMIN D DEFICIENCY: ICD-10-CM

## 2020-11-05 DIAGNOSIS — I50.22 CHRONIC SYSTOLIC CONGESTIVE HEART FAILURE (HCC): Primary | ICD-10-CM

## 2020-11-05 DIAGNOSIS — E11.22 TYPE 2 DIABETES MELLITUS WITH STAGE 4 CHRONIC KIDNEY DISEASE AND HYPERTENSION (HCC): ICD-10-CM

## 2020-11-05 DIAGNOSIS — N18.4 TYPE 2 DIABETES MELLITUS WITH STAGE 4 CHRONIC KIDNEY DISEASE AND HYPERTENSION (HCC): ICD-10-CM

## 2020-11-05 DIAGNOSIS — I12.9 TYPE 2 DIABETES MELLITUS WITH STAGE 4 CHRONIC KIDNEY DISEASE AND HYPERTENSION (HCC): ICD-10-CM

## 2020-11-05 PROCEDURE — 99214 OFFICE O/P EST MOD 30 MIN: CPT | Performed by: INTERNAL MEDICINE

## 2021-01-05 ENCOUNTER — APPOINTMENT (EMERGENCY)
Dept: RADIOLOGY | Facility: HOSPITAL | Age: 81
DRG: 603 | End: 2021-01-05
Payer: MEDICARE

## 2021-01-05 ENCOUNTER — HOSPITAL ENCOUNTER (INPATIENT)
Facility: HOSPITAL | Age: 81
LOS: 2 days | Discharge: HOME | DRG: 603 | End: 2021-01-07
Attending: EMERGENCY MEDICINE | Admitting: INTERNAL MEDICINE
Payer: MEDICARE

## 2021-01-05 DIAGNOSIS — I12.9 TYPE 2 DIABETES MELLITUS WITH STAGE 4 CHRONIC KIDNEY DISEASE AND HYPERTENSION (HCC): ICD-10-CM

## 2021-01-05 DIAGNOSIS — H40.9 GLAUCOMA: ICD-10-CM

## 2021-01-05 DIAGNOSIS — E11.22 TYPE 2 DIABETES MELLITUS WITH STAGE 4 CHRONIC KIDNEY DISEASE AND HYPERTENSION (HCC): ICD-10-CM

## 2021-01-05 DIAGNOSIS — N18.4 TYPE 2 DIABETES MELLITUS WITH STAGE 4 CHRONIC KIDNEY DISEASE AND HYPERTENSION (HCC): ICD-10-CM

## 2021-01-05 DIAGNOSIS — L03.114 CELLULITIS OF LEFT HAND: Primary | ICD-10-CM

## 2021-01-05 DIAGNOSIS — L08.9 WOUND INFECTION: ICD-10-CM

## 2021-01-05 DIAGNOSIS — T14.8XXA WOUND INFECTION: ICD-10-CM

## 2021-01-05 PROBLEM — I48.91 ATRIAL FIBRILLATION (HCC): Status: ACTIVE | Noted: 2021-01-05

## 2021-01-05 LAB
ALBUMIN SERPL BCP-MCNC: 3.6 G/DL (ref 3.5–5)
ALP SERPL-CCNC: 139 U/L (ref 46–116)
ALT SERPL W P-5'-P-CCNC: 30 U/L (ref 12–78)
ANION GAP SERPL CALCULATED.3IONS-SCNC: 8 MMOL/L (ref 4–13)
AST SERPL W P-5'-P-CCNC: 16 U/L (ref 5–45)
BASOPHILS # BLD AUTO: 0.01 THOUSANDS/ΜL (ref 0–0.1)
BASOPHILS NFR BLD AUTO: 0 % (ref 0–1)
BILIRUB SERPL-MCNC: 1.2 MG/DL (ref 0.2–1)
BUN SERPL-MCNC: 54 MG/DL (ref 5–25)
CALCIUM SERPL-MCNC: 8.3 MG/DL (ref 8.3–10.1)
CHLORIDE SERPL-SCNC: 102 MMOL/L (ref 100–108)
CO2 SERPL-SCNC: 28 MMOL/L (ref 21–32)
CREAT SERPL-MCNC: 2.29 MG/DL (ref 0.6–1.3)
EOSINOPHIL # BLD AUTO: 0.02 THOUSAND/ΜL (ref 0–0.61)
EOSINOPHIL NFR BLD AUTO: 0 % (ref 0–6)
ERYTHROCYTE [DISTWIDTH] IN BLOOD BY AUTOMATED COUNT: 14.6 % (ref 11.6–15.1)
GFR SERPL CREATININE-BSD FRML MDRD: 26 ML/MIN/1.73SQ M
GLUCOSE SERPL-MCNC: 180 MG/DL (ref 65–140)
GLUCOSE SERPL-MCNC: 181 MG/DL (ref 65–140)
HCT VFR BLD AUTO: 40.3 % (ref 36.5–49.3)
HGB BLD-MCNC: 13.4 G/DL (ref 12–17)
LYMPHOCYTES # BLD AUTO: 0.86 THOUSANDS/ΜL (ref 0.6–4.47)
LYMPHOCYTES NFR BLD AUTO: 14 % (ref 14–44)
MCH RBC QN AUTO: 34.4 PG (ref 26.8–34.3)
MCHC RBC AUTO-ENTMCNC: 33.3 G/DL (ref 31.4–37.4)
MCV RBC AUTO: 103 FL (ref 82–98)
MONOCYTES # BLD AUTO: 0.66 THOUSAND/ΜL (ref 0.17–1.22)
MONOCYTES NFR BLD AUTO: 11 % (ref 4–12)
NEUTROPHILS # BLD AUTO: 4.73 THOUSANDS/ΜL (ref 1.85–7.62)
NEUTS SEG NFR BLD AUTO: 75 % (ref 43–75)
PLATELET # BLD AUTO: 126 THOUSANDS/UL (ref 149–390)
PMV BLD AUTO: 9.2 FL (ref 8.9–12.7)
POTASSIUM SERPL-SCNC: 4.5 MMOL/L (ref 3.5–5.3)
PROT SERPL-MCNC: 6.8 G/DL (ref 6.4–8.2)
RBC # BLD AUTO: 3.9 MILLION/UL (ref 3.88–5.62)
SODIUM SERPL-SCNC: 138 MMOL/L (ref 136–145)
WBC # BLD AUTO: 6.28 THOUSAND/UL (ref 4.31–10.16)

## 2021-01-05 PROCEDURE — 90715 TDAP VACCINE 7 YRS/> IM: CPT | Performed by: PHYSICIAN ASSISTANT

## 2021-01-05 PROCEDURE — 87040 BLOOD CULTURE FOR BACTERIA: CPT | Performed by: PHYSICIAN ASSISTANT

## 2021-01-05 PROCEDURE — 90471 IMMUNIZATION ADMIN: CPT

## 2021-01-05 PROCEDURE — 73130 X-RAY EXAM OF HAND: CPT

## 2021-01-05 PROCEDURE — G0008 ADMIN INFLUENZA VIRUS VAC: HCPCS | Performed by: NURSE PRACTITIONER

## 2021-01-05 PROCEDURE — 99285 EMERGENCY DEPT VISIT HI MDM: CPT | Performed by: PHYSICIAN ASSISTANT

## 2021-01-05 PROCEDURE — 73110 X-RAY EXAM OF WRIST: CPT

## 2021-01-05 PROCEDURE — 82948 REAGENT STRIP/BLOOD GLUCOSE: CPT

## 2021-01-05 PROCEDURE — 96374 THER/PROPH/DIAG INJ IV PUSH: CPT

## 2021-01-05 PROCEDURE — 80053 COMPREHEN METABOLIC PANEL: CPT | Performed by: PHYSICIAN ASSISTANT

## 2021-01-05 PROCEDURE — 36415 COLL VENOUS BLD VENIPUNCTURE: CPT | Performed by: PHYSICIAN ASSISTANT

## 2021-01-05 PROCEDURE — 99284 EMERGENCY DEPT VISIT MOD MDM: CPT

## 2021-01-05 PROCEDURE — 99223 1ST HOSP IP/OBS HIGH 75: CPT | Performed by: NURSE PRACTITIONER

## 2021-01-05 PROCEDURE — 90662 IIV NO PRSV INCREASED AG IM: CPT | Performed by: NURSE PRACTITIONER

## 2021-01-05 PROCEDURE — 1124F ACP DISCUSS-NO DSCNMKR DOCD: CPT | Performed by: PHYSICIAN ASSISTANT

## 2021-01-05 PROCEDURE — 85025 COMPLETE CBC W/AUTO DIFF WBC: CPT | Performed by: PHYSICIAN ASSISTANT

## 2021-01-05 RX ORDER — ALLOPURINOL 100 MG/1
100 TABLET ORAL 2 TIMES DAILY
Status: DISCONTINUED | OUTPATIENT
Start: 2021-01-05 | End: 2021-01-07 | Stop reason: HOSPADM

## 2021-01-05 RX ORDER — ATORVASTATIN CALCIUM 40 MG/1
40 TABLET, FILM COATED ORAL
Status: DISCONTINUED | OUTPATIENT
Start: 2021-01-06 | End: 2021-01-07 | Stop reason: HOSPADM

## 2021-01-05 RX ORDER — GINSENG 100 MG
1 CAPSULE ORAL ONCE
Status: COMPLETED | OUTPATIENT
Start: 2021-01-05 | End: 2021-01-05

## 2021-01-05 RX ORDER — MELATONIN
1000 DAILY
Status: DISCONTINUED | OUTPATIENT
Start: 2021-01-06 | End: 2021-01-07 | Stop reason: HOSPADM

## 2021-01-05 RX ORDER — CARVEDILOL 6.25 MG/1
6.25 TABLET ORAL 2 TIMES DAILY WITH MEALS
Status: DISCONTINUED | OUTPATIENT
Start: 2021-01-06 | End: 2021-01-07 | Stop reason: HOSPADM

## 2021-01-05 RX ORDER — FUROSEMIDE 40 MG/1
40 TABLET ORAL DAILY
Status: DISCONTINUED | OUTPATIENT
Start: 2021-01-05 | End: 2021-01-07 | Stop reason: HOSPADM

## 2021-01-05 RX ORDER — CEFTRIAXONE 1 G/50ML
1000 INJECTION, SOLUTION INTRAVENOUS ONCE
Status: COMPLETED | OUTPATIENT
Start: 2021-01-05 | End: 2021-01-05

## 2021-01-05 RX ORDER — CEFAZOLIN SODIUM 1 G/50ML
1000 SOLUTION INTRAVENOUS EVERY 12 HOURS
Status: DISCONTINUED | OUTPATIENT
Start: 2021-01-06 | End: 2021-01-07 | Stop reason: HOSPADM

## 2021-01-05 RX ORDER — ISOSORBIDE MONONITRATE 30 MG/1
30 TABLET, EXTENDED RELEASE ORAL DAILY
Status: DISCONTINUED | OUTPATIENT
Start: 2021-01-06 | End: 2021-01-07 | Stop reason: HOSPADM

## 2021-01-05 RX ORDER — AMLODIPINE BESYLATE 2.5 MG/1
2.5 TABLET ORAL DAILY
Status: DISCONTINUED | OUTPATIENT
Start: 2021-01-06 | End: 2021-01-07 | Stop reason: HOSPADM

## 2021-01-05 RX ORDER — GINSENG 100 MG
1 CAPSULE ORAL DAILY
Status: DISCONTINUED | OUTPATIENT
Start: 2021-01-06 | End: 2021-01-07 | Stop reason: HOSPADM

## 2021-01-05 RX ORDER — TIMOLOL MALEATE 5 MG/ML
1 SOLUTION/ DROPS OPHTHALMIC 2 TIMES DAILY
Status: DISCONTINUED | OUTPATIENT
Start: 2021-01-05 | End: 2021-01-07 | Stop reason: HOSPADM

## 2021-01-05 RX ORDER — LATANOPROST 50 UG/ML
1 SOLUTION/ DROPS OPHTHALMIC
Status: DISCONTINUED | OUTPATIENT
Start: 2021-01-05 | End: 2021-01-07 | Stop reason: HOSPADM

## 2021-01-05 RX ORDER — ACETAMINOPHEN 325 MG/1
650 TABLET ORAL EVERY 6 HOURS PRN
Status: DISCONTINUED | OUTPATIENT
Start: 2021-01-05 | End: 2021-01-07 | Stop reason: HOSPADM

## 2021-01-05 RX ADMIN — INFLUENZA A VIRUS A/MICHIGAN/45/2015 X-275 (H1N1) ANTIGEN (FORMALDEHYDE INACTIVATED), INFLUENZA A VIRUS A/SINGAPORE/INFIMH-16-0019/2016 IVR-186 (H3N2) ANTIGEN (FORMALDEHYDE INACTIVATED), INFLUENZA B VIRUS B/PHUKET/3073/2013 ANTIGEN (FORMALDEHYDE INACTIVATED), AND INFLUENZA B VIRUS B/MARYLAND/15/2016 BX-69A ANTIGEN (FORMALDEHYDE INACTIVATED) 0.7 ML: 60; 60; 60; 60 INJECTION, SUSPENSION INTRAMUSCULAR at 23:12

## 2021-01-05 RX ADMIN — TIMOLOL MALEATE 1 DROP: 5 SOLUTION/ DROPS OPHTHALMIC at 23:17

## 2021-01-05 RX ADMIN — APIXABAN 2.5 MG: 2.5 TABLET, FILM COATED ORAL at 23:09

## 2021-01-05 RX ADMIN — TETANUS TOXOID, REDUCED DIPHTHERIA TOXOID AND ACELLULAR PERTUSSIS VACCINE, ADSORBED 0.5 ML: 5; 2.5; 8; 8; 2.5 SUSPENSION INTRAMUSCULAR at 18:18

## 2021-01-05 RX ADMIN — CEFTRIAXONE 1000 MG: 1 INJECTION, SOLUTION INTRAVENOUS at 18:18

## 2021-01-05 RX ADMIN — LATANOPROST 1 DROP: 50 SOLUTION OPHTHALMIC at 23:17

## 2021-01-05 RX ADMIN — INSULIN LISPRO 1 UNITS: 100 INJECTION, SOLUTION INTRAVENOUS; SUBCUTANEOUS at 23:19

## 2021-01-05 RX ADMIN — ALLOPURINOL 100 MG: 100 TABLET ORAL at 23:09

## 2021-01-05 RX ADMIN — FUROSEMIDE 40 MG: 40 TABLET ORAL at 23:09

## 2021-01-05 RX ADMIN — BACITRACIN ZINC 1 LARGE APPLICATION: 500 OINTMENT TOPICAL at 18:18

## 2021-01-05 NOTE — ED PROVIDER NOTES
History  Chief Complaint   Patient presents with    Hand Injury     Accompanied by son who states sent by PCP for left hand infection  Large soiled dressing in place  Son states patient fell on a slippery step and cut top of left hand ( describes a skin tear ) and has been " doctoring it himself " and not changing dressing frequently  Patient continues to work delivering oil   57-year-old male presenting today with a left hand wound and injury that occurred about 5 days ago after he tripped while going up the steps striking his left hand  Had a large skin tear that he wrapped and then taped securely after the injury however has not changed the bandage since that point  Was told to come to the ED by his PCP  Patient does have spreading redness and warmth surrounding the wound  Has not evaluated the wound since placing the initial bandage  Has not had any fevers, continues with pain  Has a prior fracture to the wrist   Denies fevers, numbness, paresthesias, weakness  Prior to Admission Medications   Prescriptions Last Dose Informant Patient Reported? Taking?    ALPRAZolam (XANAX) 0 5 mg tablet  Child Yes No   Blood Pressure Monitor NILTON  Child No No   Sig: by Does not apply route daily   ELIQUIS 2 5 MG  Child No No   Sig: TAKE ONE TABLET BY MOUTH TWICE A DAY   allopurinol (ZYLOPRIM) 100 mg tablet  Child Yes No   Sig: Take 100 mg by mouth 2 (two) times a day   amLODIPine (NORVASC) 2 5 mg tablet  Child Yes No   Sig: Take 2 5 mg by mouth daily   atorvastatin (LIPITOR) 40 mg tablet  Child No No   Sig: Take 1 tablet (40 mg total) by mouth daily with dinner   carvedilol (COREG) 6 25 mg tablet  Child No No   Sig: Take 1 tablet (6 25 mg total) by mouth 2 (two) times a day with meals   cholecalciferol (VITAMIN D3) 1,000 units tablet  Child No No   Sig: Take 1 tablet (1,000 Units total) by mouth daily   furosemide (LASIX) 40 mg tablet  Child Yes No   Sig: Take 40 mg by mouth daily    glimepiride (AMARYL) 4 mg tablet  Child Yes No   Sig: Take 4 mg by mouth 2 (two) times a day    halobetasol (ULTRAVATE) 0 05 % cream  Child Yes No   Sig: Apply 1 application topically daily as needed (Ezcema)     isosorbide mononitrate (IMDUR) 30 mg 24 hr tablet  Child No No   Sig: Take 1 tablet (30 mg total) by mouth daily   latanoprost (XALATAN) 0 005 % ophthalmic solution  Child Yes No   Si drop daily at bedtime   sitaGLIPtin (JANUVIA) 100 mg tablet  Child Yes No   Sig: Take 100 mg by mouth daily    timolol (TIMOPTIC) 0 5 % ophthalmic solution  Child No No   Sig: Administer 1 drop to both eyes 2 (two) times a day      Facility-Administered Medications: None       Past Medical History:   Diagnosis Date    Chronic kidney disease     Coronary artery disease     Diabetes mellitus (HCC)     Hyperlipidemia     Hypertension        History reviewed  No pertinent surgical history  Family History   Problem Relation Age of Onset    Heart disease Mother     No Known Problems Father      I have reviewed and agree with the history as documented  E-Cigarette/Vaping    E-Cigarette Use Never User      E-Cigarette/Vaping Substances     Social History     Tobacco Use    Smoking status: Former Smoker    Smokeless tobacco: Former User   Substance Use Topics    Alcohol use: No    Drug use: No       Review of Systems   Constitutional: Negative  HENT: Negative  Eyes: Negative  Respiratory: Negative  Cardiovascular: Negative  Gastrointestinal: Negative  Genitourinary: Negative  Musculoskeletal: Positive for joint swelling  Negative for arthralgias, back pain, gait problem, myalgias, neck pain and neck stiffness  Skin: Positive for color change and wound  Negative for pallor and rash  Neurological: Negative  All other systems reviewed and are negative  Physical Exam  Physical Exam  Vitals signs and nursing note reviewed  Constitutional:       Appearance: Normal appearance  He is normal weight     HENT: Head: Normocephalic and atraumatic  Right Ear: External ear normal       Left Ear: External ear normal       Nose: Nose normal       Mouth/Throat:      Pharynx: Oropharynx is clear  Eyes:      Conjunctiva/sclera: Conjunctivae normal    Cardiovascular:      Rate and Rhythm: Normal rate  Pulses: Normal pulses  Pulmonary:      Effort: Pulmonary effort is normal       Comments: S PO2 is 98% indicating adequate oxygenation  Musculoskeletal:         General: Tenderness, deformity and signs of injury present  Skin:     General: Skin is warm  Findings: Bruising and erythema present  Neurological:      General: No focal deficit present  Mental Status: He is alert  Mental status is at baseline                   Vital Signs  ED Triage Vitals [01/05/21 1453]   Temperature Pulse Respirations Blood Pressure SpO2   (!) 97 4 °F (36 3 °C) 78 20 (!) 173/82 99 %      Temp Source Heart Rate Source Patient Position - Orthostatic VS BP Location FiO2 (%)   Tympanic Monitor Sitting Left arm --      Pain Score       --           Vitals:    01/05/21 1453 01/05/21 1659   BP: (!) 173/82 156/72   Pulse: 78 79   Patient Position - Orthostatic VS: Sitting Sitting         Visual Acuity      ED Medications  Medications   cefTRIAXone (ROCEPHIN) IVPB (premix in dextrose) 1,000 mg 50 mL (1,000 mg Intravenous New Bag 1/5/21 1818)   tetanus-diphtheria-acellular pertussis (BOOSTRIX) IM injection 0 5 mL (0 5 mL Intramuscular Given 1/5/21 1818)   bacitracin topical ointment 1 large application (1 large application Topical Given 1/5/21 1818)       Diagnostic Studies  Results Reviewed     Procedure Component Value Units Date/Time    Comprehensive metabolic panel [[id number]]  (Abnormal) Collected: 01/05/21 1656    Lab Status: Final result Specimen: Blood from Arm, Right Updated: 01/05/21 1718     Sodium 138 mmol/L      Potassium 4 5 mmol/L      Chloride 102 mmol/L      CO2 28 mmol/L      ANION GAP 8 mmol/L      BUN 54 mg/dL Creatinine 2 29 mg/dL      Glucose 181 mg/dL      Calcium 8 3 mg/dL      AST 16 U/L      ALT 30 U/L      Alkaline Phosphatase 139 U/L      Total Protein 6 8 g/dL      Albumin 3 6 g/dL      Total Bilirubin 1 20 mg/dL      eGFR 26 ml/min/1 73sq m     Narrative:      [place] guidelines for Chronic Kidney Disease (CKD):     Stage 1 with normal or high GFR (GFR > 90 mL/min/1 73 square meters)    Stage 2 Mild CKD (GFR = 60-89 mL/min/1 73 square meters)    Stage 3A Moderate CKD (GFR = 45-59 mL/min/1 73 square meters)    Stage 3B Moderate CKD (GFR = 30-44 mL/min/1 73 square meters)    Stage 4 Severe CKD (GFR = 15-29 mL/min/1 73 square meters)    Stage 5 End Stage CKD (GFR <15 mL/min/1 73 square meters)  Note: GFR calculation is accurate only with a steady state creatinine    CBC and differential [[id number]]  (Abnormal) Collected: 01/05/21 1656    Lab Status: Final result Specimen: Blood from Arm, Right Updated: 01/05/21 1703     WBC 6 28 Thousand/uL      RBC 3 90 Million/uL      Hemoglobin 13 4 g/dL      Hematocrit 40 3 %       fL      MCH 34 4 pg      MCHC 33 3 g/dL      RDW 14 6 %      MPV 9 2 fL      Platelets 756 Thousands/uL      Neutrophils Relative 75 %      Lymphocytes Relative 14 %      Monocytes Relative 11 %      Eosinophils Relative 0 %      Basophils Relative 0 %      Neutrophils Absolute 4 73 Thousands/µL      Lymphocytes Absolute 0 86 Thousands/µL      Monocytes Absolute 0 66 Thousand/µL      Eosinophils Absolute 0 02 Thousand/µL      Basophils Absolute 0 01 Thousands/µL     Blood culture #1 [[id number]] Collected: 01/05/21 1656    Lab Status: In process Specimen: Blood from Hand, Right Updated: 01/05/21 1700    Blood culture #2 [[id number]] Collected: 01/05/21 1656    Lab Status: In process Specimen: Blood from Arm, Right Updated: 01/05/21 1700                 XR hand 3+ views LEFT   Final Result by Julio Graham DO (01/05 1733)      No acute osseous abnormality  Dorsal soft tissue swelling  Chronic changes as described  Workstation performed: [id number]         XR wrist 3+ views LEFT   Final Result by Alina Mejía DO (01/05 1737)      No acute osseous abnormality  Soft tissue swelling  Chronic changes as described  Workstation performed: [id number]                    Procedures  Procedures         ED Course  ED Course as of Jan 05 1833   Tue Jan 05, 2021   1806 Reaching out to hand on call Dr Alysia Phipps      26 Spoke with Dr Tj Rodriguez 20yo+      Most Recent Value   SBIRT (25 yo +)   In order to provide better care to our patients, we are screening all of our patients for alcohol and drug use  Would it be okay to ask you these screening questions? No Filed at: 01/05/2021 5235                    MDM  Number of Diagnoses or Management Options  Cellulitis of left hand:   Wound infection:   Diagnosis management comments: Discussed case with hand/plastics on call Dr Alysia Phipps who agrees that admission is warranted for IV antibiotics, states that no skin grafting will be performed until infection is under control and then this can be done in outpatient setting  Dr Vedia Dakin would like wound to have bacitracin and Xeroform twice a day accompanied with IV antibiotics  Patient is allergic to sulfa  Wound was cleaned to the best of our ability with as much grease removed as possible  Patient verbalizes understanding and agrees with the above assessment plan         Amount and/or Complexity of Data Reviewed  Clinical lab tests: reviewed and ordered  Tests in the radiology section of CPT®: ordered and reviewed  Review and summarize past medical records: yes  Independent visualization of images, tracings, or specimens: yes        Disposition  Final diagnoses:   Cellulitis of left hand   Wound infection     Time reflects when diagnosis was documented in both MDM as applicable and the Disposition within this note     Time User Action Codes Description Comment    1/5/2021  6:20 PM Ally Basket Add [O63 650] Cellulitis of left hand     1/5/2021  6:20 PM Ally Basket Add [T14  8XXA,  L08 9] Wound infection       ED Disposition     ED Disposition Condition Date/Time Comment    Admit Stable Tue Jan 5, 2021  6:19 PM Case was discussed with Dr Matt Bello and the patient's admission status was agreed to be Admission Status: inpatient status to the service of Dr Matt Bello   Follow-up Information    None         Patient's Medications   Discharge Prescriptions    No medications on file     No discharge procedures on file      PDMP Review     None          ED Provider  Electronically Signed by           Fiona Barth PA-C  01/05/21 6101

## 2021-01-06 LAB
ALBUMIN SERPL BCP-MCNC: 3 G/DL (ref 3.5–5)
ALP SERPL-CCNC: 124 U/L (ref 46–116)
ALT SERPL W P-5'-P-CCNC: 26 U/L (ref 12–78)
ANION GAP SERPL CALCULATED.3IONS-SCNC: 8 MMOL/L (ref 4–13)
AST SERPL W P-5'-P-CCNC: 16 U/L (ref 5–45)
BILIRUB SERPL-MCNC: 0.7 MG/DL (ref 0.2–1)
BUN SERPL-MCNC: 47 MG/DL (ref 5–25)
CALCIUM ALBUM COR SERPL-MCNC: 8.6 MG/DL (ref 8.3–10.1)
CALCIUM SERPL-MCNC: 7.8 MG/DL (ref 8.3–10.1)
CHLORIDE SERPL-SCNC: 104 MMOL/L (ref 100–108)
CO2 SERPL-SCNC: 26 MMOL/L (ref 21–32)
CREAT SERPL-MCNC: 2.11 MG/DL (ref 0.6–1.3)
ERYTHROCYTE [DISTWIDTH] IN BLOOD BY AUTOMATED COUNT: 14.9 % (ref 11.6–15.1)
EST. AVERAGE GLUCOSE BLD GHB EST-MCNC: 148 MG/DL
GFR SERPL CREATININE-BSD FRML MDRD: 28 ML/MIN/1.73SQ M
GLUCOSE SERPL-MCNC: 113 MG/DL (ref 65–140)
GLUCOSE SERPL-MCNC: 123 MG/DL (ref 65–140)
GLUCOSE SERPL-MCNC: 144 MG/DL (ref 65–140)
GLUCOSE SERPL-MCNC: 157 MG/DL (ref 65–140)
GLUCOSE SERPL-MCNC: 190 MG/DL (ref 65–140)
GLUCOSE SERPL-MCNC: 228 MG/DL (ref 65–140)
GLUCOSE SERPL-MCNC: 61 MG/DL (ref 65–140)
HBA1C MFR BLD: 6.8 %
HCT VFR BLD AUTO: 37.5 % (ref 36.5–49.3)
HGB BLD-MCNC: 12.4 G/DL (ref 12–17)
MAGNESIUM SERPL-MCNC: 2.4 MG/DL (ref 1.6–2.6)
MCH RBC QN AUTO: 34.2 PG (ref 26.8–34.3)
MCHC RBC AUTO-ENTMCNC: 33.1 G/DL (ref 31.4–37.4)
MCV RBC AUTO: 103 FL (ref 82–98)
PLATELET # BLD AUTO: 123 THOUSANDS/UL (ref 149–390)
PMV BLD AUTO: 9.7 FL (ref 8.9–12.7)
POTASSIUM SERPL-SCNC: 3.5 MMOL/L (ref 3.5–5.3)
PROCALCITONIN SERPL-MCNC: <0.05 NG/ML
PROT SERPL-MCNC: 6 G/DL (ref 6.4–8.2)
RBC # BLD AUTO: 3.63 MILLION/UL (ref 3.88–5.62)
SODIUM SERPL-SCNC: 138 MMOL/L (ref 136–145)
WBC # BLD AUTO: 5.56 THOUSAND/UL (ref 4.31–10.16)

## 2021-01-06 PROCEDURE — 87081 CULTURE SCREEN ONLY: CPT | Performed by: INTERNAL MEDICINE

## 2021-01-06 PROCEDURE — 99222 1ST HOSP IP/OBS MODERATE 55: CPT | Performed by: ORTHOPAEDIC SURGERY

## 2021-01-06 PROCEDURE — 99232 SBSQ HOSP IP/OBS MODERATE 35: CPT | Performed by: INTERNAL MEDICINE

## 2021-01-06 PROCEDURE — 83735 ASSAY OF MAGNESIUM: CPT | Performed by: NURSE PRACTITIONER

## 2021-01-06 PROCEDURE — 80053 COMPREHEN METABOLIC PANEL: CPT | Performed by: NURSE PRACTITIONER

## 2021-01-06 PROCEDURE — 83036 HEMOGLOBIN GLYCOSYLATED A1C: CPT | Performed by: NURSE PRACTITIONER

## 2021-01-06 PROCEDURE — 82948 REAGENT STRIP/BLOOD GLUCOSE: CPT

## 2021-01-06 PROCEDURE — 85027 COMPLETE CBC AUTOMATED: CPT | Performed by: NURSE PRACTITIONER

## 2021-01-06 PROCEDURE — 84145 PROCALCITONIN (PCT): CPT | Performed by: NURSE PRACTITIONER

## 2021-01-06 RX ORDER — ASCORBIC ACID 500 MG
500 TABLET ORAL DAILY
Status: DISCONTINUED | OUTPATIENT
Start: 2021-01-06 | End: 2021-01-07 | Stop reason: HOSPADM

## 2021-01-06 RX ADMIN — APIXABAN 2.5 MG: 2.5 TABLET, FILM COATED ORAL at 08:35

## 2021-01-06 RX ADMIN — INSULIN LISPRO 2 UNITS: 100 INJECTION, SOLUTION INTRAVENOUS; SUBCUTANEOUS at 12:08

## 2021-01-06 RX ADMIN — ISOSORBIDE MONONITRATE 30 MG: 30 TABLET, EXTENDED RELEASE ORAL at 08:35

## 2021-01-06 RX ADMIN — CARVEDILOL 6.25 MG: 6.25 TABLET, FILM COATED ORAL at 16:45

## 2021-01-06 RX ADMIN — Medication 1000 UNITS: at 08:35

## 2021-01-06 RX ADMIN — TIMOLOL MALEATE 1 DROP: 5 SOLUTION/ DROPS OPHTHALMIC at 18:16

## 2021-01-06 RX ADMIN — SITAGLIPTIN 25 MG: 25 TABLET, FILM COATED ORAL at 09:33

## 2021-01-06 RX ADMIN — Medication 1 TABLET: at 09:33

## 2021-01-06 RX ADMIN — LATANOPROST 1 DROP: 50 SOLUTION OPHTHALMIC at 21:06

## 2021-01-06 RX ADMIN — APIXABAN 2.5 MG: 2.5 TABLET, FILM COATED ORAL at 18:16

## 2021-01-06 RX ADMIN — CARVEDILOL 6.25 MG: 6.25 TABLET, FILM COATED ORAL at 08:35

## 2021-01-06 RX ADMIN — ALLOPURINOL 100 MG: 100 TABLET ORAL at 18:16

## 2021-01-06 RX ADMIN — ALLOPURINOL 100 MG: 100 TABLET ORAL at 08:35

## 2021-01-06 RX ADMIN — CEFAZOLIN SODIUM 1000 MG: 1 SOLUTION INTRAVENOUS at 18:16

## 2021-01-06 RX ADMIN — ATORVASTATIN CALCIUM 40 MG: 40 TABLET, FILM COATED ORAL at 16:45

## 2021-01-06 RX ADMIN — FUROSEMIDE 40 MG: 40 TABLET ORAL at 08:35

## 2021-01-06 RX ADMIN — BACITRACIN ZINC 1 LARGE APPLICATION: 500 OINTMENT TOPICAL at 08:36

## 2021-01-06 RX ADMIN — AMLODIPINE BESYLATE 2.5 MG: 2.5 TABLET ORAL at 08:35

## 2021-01-06 RX ADMIN — TIMOLOL MALEATE 1 DROP: 5 SOLUTION/ DROPS OPHTHALMIC at 08:36

## 2021-01-06 RX ADMIN — CEFAZOLIN SODIUM 1000 MG: 1 SOLUTION INTRAVENOUS at 05:49

## 2021-01-06 RX ADMIN — INSULIN LISPRO 1 UNITS: 100 INJECTION, SOLUTION INTRAVENOUS; SUBCUTANEOUS at 16:46

## 2021-01-06 RX ADMIN — INSULIN LISPRO 2 UNITS: 100 INJECTION, SOLUTION INTRAVENOUS; SUBCUTANEOUS at 04:06

## 2021-01-06 RX ADMIN — OXYCODONE HYDROCHLORIDE AND ACETAMINOPHEN 500 MG: 500 TABLET ORAL at 09:33

## 2021-01-06 NOTE — ASSESSMENT & PLAN NOTE
Wt Readings from Last 3 Encounters:   01/05/21 74 1 kg (163 lb 6 4 oz)   10/29/20 72 6 kg (160 lb)   09/22/20 71 2 kg (157 lb)   Last EF 40%, follows with Dr Rachelle Trejo as an outpatient  Continue coreg, lasix  Daily weights  Monitor intake and output

## 2021-01-06 NOTE — ASSESSMENT & PLAN NOTE
Wt Readings from Last 3 Encounters:   01/07/21 74 kg (163 lb 2 3 oz)   10/29/20 72 6 kg (160 lb)   09/22/20 71 2 kg (157 lb)   Last EF 40%, follows with Dr Klaudia Del Castillo as an outpatient  Patient does have leg edema which he reports to be chronic but lungs are clear saturating adequately on room air and lying flat  Remains euvolemic  · Continue coreg, lasix  · Daily weights-stable

## 2021-01-06 NOTE — CONSULTS
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  Review Of Systems:   · Skin: Normal  · Neuro: See HPI  · Musculoskeletal: See HPI  · 14 point review of systems negative except as stated above     Past Medical History:   Past Medical History:   Diagnosis Date    Atrial fibrillation ([facility] )     Chronic kidney disease     Coronary artery disease     Diabetes mellitus ([facility] )     Hyperlipidemia     Hypertension        Past Surgical History:   Past Surgical History:   Procedure Laterality Date    SKIN CANCER EXCISION      TONSILLECTOMY         Family History:  Family history reviewed and non-contributory  Family History   Problem Relation Age of Onset    Heart disease Mother     No Known Problems Father        Social History:  Social History     Socioeconomic History    Marital status:       Spouse name: None    Number of children: None    Years of education: None    Highest education level: None   Occupational History    None   Social Needs    Financial resource strain: None    Food insecurity     Worry: None     Inability: None    Transportation needs     Medical: None     Non-medical: None   Tobacco Use    Smoking status: Former Smoker    Smokeless tobacco: Former User   Substance and Sexual Activity    Alcohol use: Not Currently    Drug use: Not Currently    Sexual activity: None   Lifestyle    Physical activity     Days per week: None Minutes per session: None    Stress: None   Relationships    Social connections     Talks on phone: None     Gets together: None     Attends Yazidism service: None     Active member of club or organization: None     Attends meetings of clubs or organizations: None     Relationship status: None    Intimate partner violence     Fear of current or ex partner: None     Emotionally abused: None     Physically abused: None     Forced sexual activity: None   Other Topics Concern    None   Social History Narrative    None       Allergies:    Allergies   Allergen Reactions    Sulfa Antibiotics     Sulfur            Labs:  0   Lab Value Date/Time    HCT 37 5 01/06/2021 0529    HCT 40 3 01/05/2021 1656    HCT 38 3 10/01/2020    HGB 12 4 01/06/2021 0529    HGB 13 4 01/05/2021 1656    HGB 12 8 10/01/2020    INR 1 10 01/11/2019 0450    WBC 5 56 01/06/2021 0529    WBC 6 28 01/05/2021 1656    WBC 5 30 10/01/2020    WBC 4 50 05/04/2020    WBC 5 60 01/13/2020    CRP 12 1 (H) 01/11/2019 1001       Meds:    Current Facility-Administered Medications:     acetaminophen (TYLENOL) tablet 650 mg, 650 mg, Oral, Q6H PRN, Elgin Meadow, CRNP    allopurinol (ZYLOPRIM) tablet 100 mg, 100 mg, Oral, BID, Elgin Meadow, CRNP, 100 mg at 01/06/21 0835    amLODIPine (NORVASC) tablet 2 5 mg, 2 5 mg, Oral, Daily, Elgin Meadow, CRNP, 2 5 mg at 01/06/21 8871    apixaban (ELIQUIS) tablet 2 5 mg, 2 5 mg, Oral, BID, Elgin Meadow, CRNP, 2 5 mg at 01/06/21 0317    ascorbic acid (VITAMIN C) tablet 500 mg, 500 mg, Oral, Daily, Ritu Vazquez MD, 500 mg at 01/06/21 0933    atorvastatin (LIPITOR) tablet 40 mg, 40 mg, Oral, Daily With Dinner, Elgin Meadow, CRNP    bacitracin topical ointment 1 large application, 1 large application, Topical, Daily, Elgin Meadow, CRNP, 1 large application at 15/41/65 0836    carvedilol (COREG) tablet 6 25 mg, 6 25 mg, Oral, BID With Meals, SUSANA Mead, 6 25 mg at 01/06/21 0855   ceFAZolin (ANCEF) IVPB (premix in dextrose) 1,000 mg 50 mL, 1,000 mg, Intravenous, Q12H, Emry Conception, CRNP, Last Rate: 100 mL/hr at 01/06/21 0549, 1,000 mg at 01/06/21 0549    cholecalciferol (VITAMIN D3) tablet 1,000 Units, 1,000 Units, Oral, Daily, Emry Conception, CRNP, 1,000 Units at 01/06/21 7504    furosemide (LASIX) tablet 40 mg, 40 mg, Oral, Daily, Emry Conception, CRNP, 40 mg at 01/06/21 0835    insulin lispro (HumaLOG) 100 units/mL subcutaneous injection 1-5 Units, 1-5 Units, Subcutaneous, TID AC, 2 Units at 01/06/21 1208 **AND** Fingerstick Glucose (POCT), , , TID AC, Nghia Guzman MD    insulin lispro (HumaLOG) 100 units/mL subcutaneous injection 1-5 Units, 1-5 Units, Subcutaneous, HS, Nghia Guzman MD    [START ON 1/7/2021] insulin lispro (HumaLOG) 100 units/mL subcutaneous injection 1-5 Units, 1-5 Units, Subcutaneous, 0200, Nghia Guzman MD    isosorbide mononitrate (IMDUR) 24 hr tablet 30 mg, 30 mg, Oral, Daily, Emry Conception, CRNP, 30 mg at 01/06/21 0835    latanoprost (XALATAN) 0 005 % ophthalmic solution 1 drop, 1 drop, Both Eyes, HS, Violetta Breen, SUSANA, 1 drop at 01/05/21 2317    multivitamin-minerals (CENTRUM) tablet 1 tablet, 1 tablet, Oral, Daily, Nghia Guzman MD, 1 tablet at 01/06/21 0933    sitaGLIPtin (JANUVIA) tablet 25 mg, 25 mg, Oral, Daily, Nghia Guzman MD, 25 mg at 01/06/21 0933    timolol (TIMOPTIC) 0 5 % ophthalmic solution 1 drop, 1 drop, Both Eyes, BID, Emry Conception, CRNP, 1 drop at 01/06/21 3014    Blood Culture:   Lab Results   Component Value Date    BLOODCX Received in Microbiology Lab  Culture in Progress  01/05/2021    BLOODCX Received in Microbiology Lab  Culture in Progress  01/05/2021       Wound Culture:   No results found for: WOUNDCULT    Ins and Outs:  No intake/output data recorded            Physical Exam:   /75   Pulse 67   Temp 98 6 °F (37 °C)   Resp 17   Ht 5' 9" (1 753 m)   Wt 74 1 kg (163 lb 5 8 oz)   SpO2 100%   BMI 24 12 kg/m²   Gen: Alert and oriented to person, place, time  HEENT: EOMI, eyes clear, moist mucus membranes, hearing intact  Respiratory: Bilateral chest rise  No audible wheezing found  Cardiovascular: Regular Rate and Rhythm  Abdomen: soft nontender/nondistended  · Musculoskeletal: left Upper Extremity   · Left hand  · Please see pictures noted the media section in epic  · There is sloughing of the epidermis dorsally with some necrotic tissue  The wound is about 4 cm x 4 cm over the dorsum of the hand  There is healthy dermis below  There is no exposed tendon  Patient can extend as much as possible with limitation secondary to Dupuytren's  I believe his finger motion is at his baseline  ·   · Sensory intact distal median radial ulnar nerves  · Compartment soft  · Brisk cap refill  · I do not note any palpable abscess or collection  · Patient does have mild tenderness about the dorsal aspect of the hand but I do not see any significant erythema or induration  · No significant edema    Radiology:   I personally reviewed the films  X-rays of left hand show no fractures dislocations  Patient has old healed distal radius fracture    _*_*_*_*_*_*_*_*_*_*_*_*_*_*_*_*_*_*_*_*_*_*_*_*_*_*_*_*_*_*_*_*_*_*_*_*_*_*_*_*_*    Assessment:  80 y o male with  left hand mild dorsal cellulitis associated epidermal sloughing over the dorsal aspect of the hand    Plan:   Patient has mild cellulitis over the dorsal aspect of the hand with associated loss of epidermis  There dermis appears healthy  I believe do believe this epidermis over this area will slough off  He will need local wound care  I do not believe he will need a skin graft  The dermis should heal with wound care and epidermis should reform  Was keep the hand clean as per wound care instructions  We can continue antibiotics  Patient can follow-up as an outpatient  I do not think he will need skin graft      Cheli Espinosa,

## 2021-01-06 NOTE — H&P
H&P- Balbina Smith 1940, 78 y o  male MRN: [id number]    Unit/Bed#: [address] [place] [address] Encounter: [id number]    Primary Care Provider: Khadar Anderson DO   Date and time admitted to hospital: 1/5/2021  4:04 PM    * Cellulitis of left upper extremity  Assessment & Plan  Patient was walking up the stairs at work and fell forward on [place] striking his hand and cutting it  He has been working with oil since, placing dry dressings daily  He went to see his PCP today for routine follow up and was referred to the ER for evaluation  He has decreased range of motion in both hands at baseline  No fevers or chills, bloody discharge from wound  Labs with no leukocytosis  Xray revealed  No acute changes, soft tissue swelling  Case was discussed with Dr Angie Hdz ([facility] [place]) who recommended admission with IV ABT, bacitracin/xeroform dressing changes      · Admit to medicine   · Given a dose of rocephin in ER, continue with ancef   · Send procalcitonin and trend  · Blood cultures pending    Atrial fibrillation [facility])  Assessment & Plan  Continue eliquis and coreg    CKD (chronic kidney disease) stage 4, GFR 15-29 ml/min [facility])  Assessment & Plan  Lab Results   Component Value Date    EGFR 26 01/05/2021    EGFR 19 10/01/2020    EGFR 18 05/04/2020    CREATININE 2 29 (H) 01/05/2021    CREATININE 2 89 10/01/2020    CREATININE 3 11 05/04/2020   Cr baseline appears to be in the 2 range  Cr today 2 29  Monitor BMP daily  Judicious use of nephrotoxic agents, avoid hypotension     Chronic systolic congestive heart failure (HCC)  Assessment & Plan  Wt Readings from Last 3 Encounters:   01/05/21 74 1 kg (163 lb 6 4 oz)   10/29/20 72 6 kg (160 lb)   09/22/20 71 2 kg (157 lb)   Last EF 40%, follows with Dr Alicia Galaviz as an outpatient  Continue coreg, lasix  Daily weights  Monitor intake and output    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 ([facility] )  Assessment & Plan  Lab Results   Component Value Date    HGBA1C 8 6 11/03/2020       No results for input(s): POCGLU in the last 72 hours  Blood Sugar Average: Last 72 hrs:  Hold amaryl and januvia  Continue accuchecks AC/HS with insulin sliding scale     Essential hypertension  Assessment & Plan  Continue norvasc, coreg    VTE Prophylaxis: Apixaban (Eliquis)  / sequential compression device   Code Status: Prior    Anticipated Length of Stay:  Patient will be admitted on an Inpatient basis with an anticipated length of stay of greater than 2 midnights  Justification for Hospital Stay: cellulitis     Total Time for Visit, including Counseling / Coordination of Care: 20 minutes  Greater than 50% of this total time spent on direct patient counseling and coordination of care  Chief Complaint:   Hand Injury (Accompanied by son who states sent by PCP for left hand infection  Large soiled dressing in place  Son states patient fell on a slippery step and cut top of left hand ( describes a skin tear ) and has been " doctoring it himself " and not changing dressing frequently  Patient continues to work delivering oil   )      History of Present Illness:    Terrie Cunningham is a 78 y o  male with a PMH of hypertension, hyperlipidemia, non-insulin-dependent diabetes, chronic kidney disease, atrial fibrillation, chronic systolic heart failure who presents with a hand wound  Patient states he was walking up the stairs on [place] year[place] when he fell forward striking his hand on the metal stairs cutting it  He did not sustain any other injuries, no head injury or loss of consciousness  Since then he has been placing dry dressings on it daily  He has continued to work with oil daily  Today he went for routine follow-up appointment with his primary care provider who thought the wound looked infected and sent into the ER  On arrival to the ER patient had labs which were at baseline  X-ray revealed no evidence of gas or osteo    Case was discussed with Hand on-call who recommended admission to [facility] with IV antibiotics  Review of Systems:    Review of Systems   Constitutional: Negative  Negative for chills and fever  HENT: Negative  Eyes: Negative  Respiratory: Negative  Cardiovascular: Negative  Gastrointestinal: Negative  Genitourinary: Negative  Musculoskeletal: Negative  Skin: Positive for wound  Allergic/Immunologic: Negative  Neurological: Negative  Hematological: Negative  Psychiatric/Behavioral: Negative  Past Medical and Surgical History:     Past Medical History:   Diagnosis Date    Atrial fibrillation ([facility] )     Chronic kidney disease     Coronary artery disease     Diabetes mellitus ([facility] )     Hyperlipidemia     Hypertension        Past Surgical History:   Procedure Laterality Date    SKIN CANCER EXCISION      TONSILLECTOMY         Meds/Allergies:    Prior to Admission medications    Medication Sig Start Date End Date Taking?  Authorizing Provider   allopurinol (ZYLOPRIM) 100 mg tablet Take 100 mg by mouth 2 (two) times a day    Historical Provider, MD   ALPRAZolam Mayda Curly) 0 5 mg tablet  3/12/18   Historical Provider, MD   amLODIPine (NORVASC) 2 5 mg tablet Take 2 5 mg by mouth daily 10/1/19   Historical Provider, MD   atorvastatin (LIPITOR) 40 mg tablet Take 1 tablet (40 mg total) by mouth daily with dinner 1/16/19   Phoebe Perez DO   Blood Pressure Monitor NILTON by Does not apply route daily 2/19/20   Judd Dill MD   carvedilol (COREG) 6 25 mg tablet Take 1 tablet (6 25 mg total) by mouth 2 (two) times a day with meals 1/16/19   Phoebe Perez DO   cholecalciferol (VITAMIN D3) 1,000 units tablet Take 1 tablet (1,000 Units total) by mouth daily 10/18/19   Judd Dill MD   ELIQUIS 2 5 MG TAKE ONE TABLET BY MOUTH TWICE A DAY 6/16/20   Erickson Ramos DO   furosemide (LASIX) 40 mg tablet Take 40 mg by mouth daily     Historical Provider, MD   glimepiride (AMARYL) 4 mg tablet Take 4 mg by mouth 2 (two) times a day Historical Provider, MD   halobetasol (ULTRAVATE) 0 05 % cream Apply 1 application topically daily as needed Aj Estrada)   11/1/18   Historical Provider, MD   isosorbide mononitrate (IMDUR) 30 mg 24 hr tablet Take 1 tablet (30 mg total) by mouth daily 1/17/19   Phoebe Perez DO   latanoprost (XALATAN) 0 005 % ophthalmic solution 1 drop daily at bedtime    Historical Provider, MD   sitaGLIPtin (JANUVIA) 100 mg tablet Take 100 mg by mouth daily     Historical Provider, MD   timolol (TIMOPTIC) 0 5 % ophthalmic solution Administer 1 drop to both eyes 2 (two) times a day 1/16/19   Phoebe Perez DO       Allergies: Allergies   Allergen Reactions    Sulfa Antibiotics     Sulfur        Social History:     Marital Status:    Substance Use History:   Social History     Substance and Sexual Activity   Alcohol Use No     Social History     Tobacco Use   Smoking Status Former Smoker   Smokeless Tobacco Former User     Social History     Substance and Sexual Activity   Drug Use No       Family History:    Family History   Problem Relation Age of Onset    Heart disease Mother     No Known Problems Father        Physical Exam:     Vitals:   Blood Pressure: 170/83 (01/05/21 2051)  Pulse: 76 (01/05/21 2051)  Temperature: 98 6 °F (37 °C) (01/05/21 2051)  Temp Source: Tympanic (01/05/21 1854)  Respirations: 17 (01/05/21 2051)  Weight - Scale: 74 1 kg (163 lb 6 4 oz) (01/05/21 1453)  SpO2: 99 % (01/05/21 2051)    Physical Exam  Vitals signs and nursing note reviewed  Constitutional:       Appearance: Normal appearance  HENT:      Head: Normocephalic and atraumatic  Nose: Nose normal       Mouth/Throat:      Mouth: Mucous membranes are moist    Eyes:      Extraocular Movements: Extraocular movements intact  Neck:      Musculoskeletal: Normal range of motion  Cardiovascular:      Rate and Rhythm: Normal rate and regular rhythm  Heart sounds: Murmur present     Pulmonary:      Effort: Pulmonary effort is normal  No respiratory distress  Breath sounds: Normal breath sounds  No wheezing  Abdominal:      General: Abdomen is flat  Bowel sounds are normal       Palpations: Abdomen is soft  Musculoskeletal: Normal range of motion  General: No swelling  Skin:     General: Skin is warm and dry  Comments: See images for wound  2+ radial pulses  Chronic deformities to bilateral hands   Neurological:      General: No focal deficit present  Mental Status: He is alert and oriented to person, place, and time  Psychiatric:         Mood and Affect: Mood normal          Behavior: Behavior normal            Additional Data:     Lab Results: I have personally reviewed pertinent reports  Results from last 7 days   Lab Units 01/05/21  1656   WBC Thousand/uL 6 28   HEMOGLOBIN g/dL 13 4   HEMATOCRIT % 40 3   PLATELETS Thousands/uL 126*   NEUTROS PCT % 75     Results from last 7 days   Lab Units 01/05/21  1656   SODIUM mmol/L 138   POTASSIUM mmol/L 4 5   CHLORIDE mmol/L 102   CO2 mmol/L 28   BUN mg/dL 54*   CREATININE mg/dL 2 29*   CALCIUM mg/dL 8 3   TOTAL BILIRUBIN mg/dL 1 20*   ALK PHOS U/L 139*   ALT U/L 30   AST U/L 16                       Imaging: I have personally reviewed pertinent reports  XR hand 3+ views LEFT   Final Result by Yasmeen Cheema DO (01/05 1733)      No acute osseous abnormality  Dorsal soft tissue swelling  Chronic changes as described  Workstation performed: [id number]         XR wrist 3+ views LEFT   Final Result by Yasmeen Cheema DO (01/05 1737)      No acute osseous abnormality  Soft tissue swelling  Chronic changes as described  Workstation performed: [id number]             XR hand 3+ views LEFT   Final Result      No acute osseous abnormality  Dorsal soft tissue swelling  Chronic changes as described  Workstation performed: [id number]         XR wrist 3+ views LEFT   Final Result      No acute osseous abnormality  Soft tissue swelling  Chronic changes as described  Workstation performed: [id number]             EKG, Pathology, and Other Studies Reviewed on Admission:   · EKG: not applicable     Allscripts / Epic Records Reviewed: Yes     ** Please Note: This note has been constructed using a voice recognition system   **

## 2021-01-06 NOTE — ASSESSMENT & PLAN NOTE
Lab Results   Component Value Date    EGFR 27 01/07/2021    EGFR 28 01/06/2021    EGFR 26 01/05/2021    CREATININE 2 20 (H) 01/07/2021    CREATININE 2 11 (H) 01/06/2021    CREATININE 2 29 (H) 01/05/2021   Cr baseline appears to be in the 2 range  Cr today 2 29  Remains stable

## 2021-01-06 NOTE — ASSESSMENT & PLAN NOTE
Lab Results   Component Value Date    HGBA1C 8 6 11/03/2020       No results for input(s): POCGLU in the last 72 hours      Blood Sugar Average: Last 72 hrs:  Hold amaryl and januvia  Continue accuchecks AC/HS with insulin sliding scale

## 2021-01-06 NOTE — ASSESSMENT & PLAN NOTE
Patient was walking up the stairs at work and fell forward on [place] striking his hand and cutting it  He has been working with oil since, placing dry dressings daily  Evaluated his PCP  for routine follow up and was referred to the ER for evaluation  Noted to have erythema and pain  Denied fevers or chills, bloody discharge from wound  Patient has decreased range of motion/contracture at baseline  Afebrile hemodynamically stable  Labs with no leukocytosis  Xray revealed  No acute changes, soft tissue swelling  Case was discussed with Dr Reji Mosley (hand) by ED who recommended admission with IV ABT, bacitracin/xeroform dressing changes  Patient was seen by Hand surgery and wound care  Recommended local wound care and antibiotics  Remains afebrile with normal white count  Overall erythema and swelling is improved  denies pain  · Treated IV cefazolin, will transition to Keflex and doxycycline at discharge to complete 7 day  · MRSA surveillance-pending  · Follow-up blood culture-negative so far  · Continue wound care-family was educated  · Patient will follow up [facility] [address] [place] [address] [facility]  · Patient was advised to keep wound clean and dry  Continue wound care and antibiotics  Follow-up with PCP and [address] [place] [address] [facility]

## 2021-01-06 NOTE — PLAN OF CARE
Problem: Potential for Falls  Goal: Patient will remain free of falls  Description: INTERVENTIONS:  - Assess patient frequently for physical needs  -  Identify cognitive and physical deficits and behaviors that affect risk of falls    -  [place] fall precautions as indicated by assessment   - Educate patient/family on patient safety including physical limitations  - Instruct patient to call for assistance with activity based on assessment  - Modify environment to reduce risk of injury  - Consider OT/PT consult to assist with strengthening/mobility  Outcome: Progressing     Problem: Prexisting or High Potential for Compromised Skin Integrity  Goal: Skin integrity is maintained or improved  Description: INTERVENTIONS:  - Identify patients at risk for skin breakdown  - Assess and monitor skin integrity  - Assess and monitor nutrition and hydration status  - Monitor labs   - Assess for incontinence   - Turn and reposition patient  - Assist with mobility/ambulation  - Relieve pressure over bony prominences  - Avoid friction and shearing  - Provide appropriate hygiene as needed including keeping skin clean and dry  - Evaluate need for skin moisturizer/barrier cream  - Collaborate with interdisciplinary team   - Patient/family teaching  - Consider wound care consult   Outcome: Progressing

## 2021-01-06 NOTE — CONSULTS
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; continued to work delivering oil  When patient saw PCP on 1/5 regarding hand wound, he was sent to the ER  Wound is a partial thickness skin tear  Partial skin flap remains  Mild swelling of hand/fingers  Bruising present  Tissue is bright red  Fragile intact skin  Per Edmar Muller RN, a dry gauze dressing is on left hand  Wound/Skin Care Plan:   1  I requested that staff RN remove dry gauze dressing by liberally applying normal saline to prevent further tissue injury, then cleanse wound with normal saline  Roll partial skin flap down over wound using a saline moistened cotton tip applicator and then apply Xeroform in a single layer  Cover with dry gauze and secure with gauze roll  Change once a day and prn      2  For skin tear on left lower leg, anterior aspect: Cleanse with normal saline  Cover with Dermagran (small size)  Unfold square and place in a single layer over wound  Then cover with dry gauze  Secure with gauze roll and tape  Change every other day and prn  3  Moisturize skin daily  4  Hand ortho consult pending  5  Wound care nurse follow-up                  Vitals: Blood pressure 144/75, pulse 67, temperature 98 4 °F (36 9 °C), resp  rate 17, height 5' 9" (1 753 m), weight 74 1 kg (163 lb 5 8 oz), SpO2 100 %  ,Body mass index is 24 12 kg/m²       Images taken 1/5/21

## 2021-01-06 NOTE — ASSESSMENT & PLAN NOTE
Lab Results   Component Value Date    HGBA1C 6 8 (H) 01/07/2021       Recent Labs     01/06/21  1616 01/06/21 2031 01/07/21  0140 01/07/21  0716   POCGLU 157* 123 113 169*       Blood Sugar Average: Last 72 hrs:  (P) 136   Acceptable  Continue home dose of Amaryl  Family denies any history of hypoglycemia  Resume Januvia but changed to renally adjusted dose at the time of discharge

## 2021-01-06 NOTE — ASSESSMENT & PLAN NOTE
Lab Results   Component Value Date    EGFR 26 01/05/2021    EGFR 19 10/01/2020    EGFR 18 05/04/2020    CREATININE 2 29 (H) 01/05/2021    CREATININE 2 89 10/01/2020    CREATININE 3 11 05/04/2020   Cr baseline appears to be in the 2 range  Cr today 2 29  Monitor BMP daily  Judicious use of nephrotoxic agents, avoid hypotension

## 2021-01-06 NOTE — PROGRESS NOTES
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)    Chronic systolic congestive heart failure (HCC)    Atrial fibrillation ([address] )    Essential hypertension    CKD (chronic kidney disease) stage 4, GFR 15-29 ml/min ([facility])      Assessment & Plan:    * Cellulitis of left upper extremity  Assessment & Plan  Patient was walking up the stairs at work and fell forward on [place] [address] striking his hand and cutting it  He has been working with oil since, placing dry dressings daily  Evaluated his PCP  for routine follow up and was referred to the ER for evaluation  Brian patient reported erythema and pain  d fevers or chills, bloody discharge from wound  Patient has decreased range of motion/contracture at baseline  Afebrile hemodynamically stable  Labs with no leukocytosis  Xray revealed  No acute changes, soft tissue swelling  Case was discussed with Dr Diann Moody (hand) by ED who recommended admission with IV ABT, bacitracin/xeroform dressing changes  Remains stable, erythema swelling noted    · Continue IV cefazolin  · MRSA surveillance  · Follow-up blood culture  · Hand surgery/orthopedic evaluation  · Continue wound care    Atrial fibrillation [facility])  Assessment & Plan  Controlled  Continue carvedilol  Hold Eliquis pending hand surgery evaluation      Chronic systolic congestive heart failure (HCC)  Assessment & Plan  Wt Readings from Last 3 Encounters:   01/06/21 74 1 kg (163 lb 5 8 oz)   10/29/20 72 6 kg (160 lb)   09/22/20 71 2 kg (157 lb)   Last EF 40%, follows with Dr Dar Argueta as an outpatient  Patient does have leg edema which he reports to be chronic but lungs are clear saturating adequately on room air and lying flat  · Continue coreg, lasix  · Daily weights  · Monitor intake and output    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 [facility])  Assessment & Plan  Lab Results   Component Value Date    HGBA1C 8 6 11/03/2020       Recent Labs     01/05/21  2257 01/06/21  0404 01/06/21  0729 01/06/21  0815   POCGLU 180* 190* 61* 113       Blood Sugar Average: Last 72 hrs:  (P) 136   Check hemoglobin A1c  Hold Amaryl for now  Resume Januvia at lower dose, renally adjusted  Continue accuchecks AC/HS with insulin sliding scale     CKD (chronic kidney disease) stage 4, GFR 15-29 ml/min [facility])  Assessment & Plan  Lab Results   Component Value Date    EGFR 28 01/06/2021    EGFR 26 01/05/2021    EGFR 19 10/01/2020    CREATININE 2 11 (H) 01/06/2021    CREATININE 2 29 (H) 01/05/2021    CREATININE 2 89 10/01/2020   Cr baseline appears to be in the 2 range  Cr today 2 29  Monitor BMP daily  Judicious use of nephrotoxic agents, avoid hypotension     Essential hypertension  Assessment & Plan  Continue norvasc, coreg, Imdur  Monitor        VTE Pharmacologic Prophylaxis:   Pharmacologic: Apixaban (Eliquis)  Mechanical VTE Prophylaxis in Place: No    Patient Centered Rounds: I have performed bedside rounds with nursing staff today  Discussions with Specialists or Other Care Team Provider:  Orthopedics, message left for Dr Barragan [place]    Education and Discussions with Family / Patient:  Yes, son over the phone at length    Time Spent for Care: 45 minutes  More than 50% of total time spent on counseling and coordination of care as described above      Current Length of Stay: 1 day(s)    Current Patient Status: Inpatient   Certification Statement: The patient will continue to require additional inpatient hospital stay due to  cellulitis requiring IV antibiotics    Discharge Plan:  Home when clinically improved    Code Status: Level 1 - Full Code      Subjective:   HPI reviewed with patient  Reports having mechanical fall resulting in left hand wound/skin tear on [place]  Patient developed infection was treating himself, patient was seen by PCP and was referred to ED for further evaluation  Patient was noted to have soiled dressing on presentation    Patient denies fever chills, reports mild pain    Patient reports history of diabetes and reports taking metformin  Does report that he does have heart condition but not sure about the medications that he takes  Only other medication he remembers is using eyedrops    Denies chest pain shortness of breath dizziness or palpitation      Objective:     Vitals:   Temp (24hrs), Av °F (36 7 °C), Min:97 4 °F (36 3 °C), Max:98 6 °F (37 °C)    Temp:  [97 4 °F (36 3 °C)-98 6 °F (37 °C)] 98 4 °F (36 9 °C)  HR:  [67-80] 67  Resp:  [17-20] 17  BP: (143-173)/(70-83) 144/75  SpO2:  [97 %-100 %] 100 % on RA  Body mass index is 24 12 kg/m²  Input and Output Summary (last 24 hours):     No intake or output data in the 24 hours ending 21 0844    Physical Exam:     Physical Exam  Constitutional:       General: He is not in acute distress  HENT:      Head: Normocephalic and atraumatic  Neck:      Musculoskeletal: Neck supple  Cardiovascular:      Rate and Rhythm: Normal rate  Pulmonary:      Effort: Pulmonary effort is normal  No respiratory distress  Breath sounds: No wheezing, rhonchi or rales  Chest:      Chest wall: No tenderness  Abdominal:      General: Bowel sounds are normal  There is no distension  Palpations: Abdomen is soft  Tenderness: There is no abdominal tenderness  There is no guarding or rebound  Musculoskeletal:         General: Swelling (Left hand) present  Right lower leg: Edema present  Left lower leg: Edema present        Comments: Left hand dressing in place mild swelling and spreading erythema noted  Bilateral hand contracture noted  Bilateral lower extremity edema (chronic as per patient)   Skin:     General: Skin is warm and dry  Findings: Erythema (Left hand) present  No rash  Neurological:      Mental Status: He is alert  Mental status is at baseline  GCS: GCS eye subscore is 4  GCS verbal subscore is 5  GCS motor subscore is 6  Cranial Nerves: No cranial nerve deficit  Comments: Tremors noted, chronic         Additional Data:     Labs:    Results from last 7 days   Lab Units 01/06/21  0529 01/05/21  1656   WBC Thousand/uL 5 56 6 28   HEMOGLOBIN g/dL 12 4 13 4   HEMATOCRIT % 37 5 40 3   PLATELETS Thousands/uL 123* 126*   NEUTROS PCT %  --  75   LYMPHS PCT %  --  14   MONOS PCT %  --  11   EOS PCT %  --  0     Results from last 7 days   Lab Units 01/06/21  0529   POTASSIUM mmol/L 3 5   CHLORIDE mmol/L 104   CO2 mmol/L 26   BUN mg/dL 47*   CREATININE mg/dL 2 11*   CALCIUM mg/dL 7 8*   ALK PHOS U/L 124*   ALT U/L 26   AST U/L 16           * I Have Reviewed All Lab Data Listed Above  * Additional Pertinent Lab Tests Reviewed: All Labs Within Last 24 Hours Reviewed      Imaging:  Imaging Reports Reviewed Today Include: X Ray    Recent Cultures (last 7 days):     Results from last 7 days   Lab Units 01/05/21  1656   BLOOD CULTURE  Received in Microbiology Lab  Culture in Progress  Received in Microbiology Lab  Culture in Progress         Last 24 Hours Medication List:   Current Facility-Administered Medications   Medication Dose Route Frequency Provider Last Rate    acetaminophen  650 mg Oral Q6H PRN SUSANA Blanchard      allopurinol  100 mg Oral BID SUSANA Blanchard      amLODIPine  2 5 mg Oral Daily SUSANA Blanchard      apixaban  2 5 mg Oral BID SUSANA Blanchard      atorvastatin  40 mg Oral Daily [facility], SUSANA      bacitracin  1 large application Topical Daily SUSANA Blanchard      carvedilol  6 25 mg Oral BID With Meals SUSANA Blanchard      cefazolin  1,000 mg Intravenous [address], CRNP 1,000 mg (01/06/21 0549)    cholecalciferol  1,000 Units Oral Daily Morgan Amble, CRNP      furosemide  40 mg Oral Daily Morgan Amble, CRNP      insulin lispro  1-6 Units Subcutaneous TID AC Morgan Amble, CRNP      insulin lispro  1-6 Units Subcutaneous HS Morgan Amble, CRNP      insulin lispro  1-6 Units Subcutaneous 0200 Morgan Amble, CRNP      isosorbide mononitrate  30 mg Oral Daily Morgan Amble, CRNP      latanoprost  1 drop Both Eyes HS Morgan Amble, CRNP      timolol  1 drop Both Eyes BID Morgan Amble, CRNP            Today, Patient Was Seen By: Adela Silver MD    ** Please Note: "This note has been constructed using a voice recognition system  Therefore there may be syntax, spelling, and/or grammatical errors   Please call if you have any questions  "**

## 2021-01-06 NOTE — PLAN OF CARE
Problem: Potential for Falls  Goal: Patient will remain free of falls  Description: INTERVENTIONS:  - Assess patient frequently for physical needs  -  Identify cognitive and physical deficits and behaviors that affect risk of falls  -  [place] fall precautions as indicated by assessment   - Educate patient/family on patient safety including physical limitations  - Instruct patient to call for assistance with activity based on assessment  - Modify environment to reduce risk of injury  - Consider OT/PT consult to assist with strengthening/mobility  Outcome: Progressing     Problem: Prexisting or High Potential for Compromised Skin Integrity  Goal: Skin integrity is maintained or improved  Description: INTERVENTIONS:  - Identify patients at risk for skin breakdown  - Assess and monitor skin integrity  - Assess and monitor nutrition and hydration status  - Monitor labs   - Assess for incontinence   - Turn and reposition patient  - Assist with mobility/ambulation  - Relieve pressure over bony prominences  - Avoid friction and shearing  - Provide appropriate hygiene as needed including keeping skin clean and dry  - Evaluate need for skin moisturizer/barrier cream  - Collaborate with interdisciplinary team   - Patient/family teaching  - Consider wound care consult   Outcome: Progressing     Problem: Nutrition/Hydration-ADULT  Goal: Nutrient/Hydration intake appropriate for improving, restoring or maintaining nutritional needs  Description: Monitor and assess patient's nutrition/hydration status for malnutrition  Collaborate with interdisciplinary team and initiate plan and interventions as ordered  Monitor patient's weight and dietary intake as ordered or per policy  Utilize nutrition screening tool and intervene as necessary  Determine patient's food preferences and provide high-protein, high-caloric foods as appropriate       INTERVENTIONS:  - Monitor oral intake, urinary output, labs, and treatment plans  - Assess nutrition and hydration status and recommend course of action  - Evaluate amount of meals eaten  - Assist patient with eating if necessary   - Allow adequate time for meals  - Recommend/ encourage appropriate diets, oral nutritional supplements, and vitamin/mineral supplements  - Order, calculate, and assess calorie counts as needed  - Recommend, monitor, and adjust tube feedings and TPN/PPN based on assessed needs  - Assess need for intravenous fluids  - Provide specific nutrition/hydration education as appropriate  - Include patient/family/caregiver in decisions related to nutrition  Outcome: Progressing

## 2021-01-07 VITALS
HEIGHT: 69 IN | RESPIRATION RATE: 15 BRPM | BODY MASS INDEX: 24.16 KG/M2 | SYSTOLIC BLOOD PRESSURE: 138 MMHG | OXYGEN SATURATION: 98 % | HEART RATE: 68 BPM | WEIGHT: 163.14 LBS | TEMPERATURE: 97.9 F | DIASTOLIC BLOOD PRESSURE: 63 MMHG

## 2021-01-07 LAB
ANION GAP SERPL CALCULATED.3IONS-SCNC: 6 MMOL/L (ref 4–13)
BASOPHILS # BLD AUTO: 0.02 THOUSANDS/ΜL (ref 0–0.1)
BASOPHILS NFR BLD AUTO: 0 % (ref 0–1)
BUN SERPL-MCNC: 49 MG/DL (ref 5–25)
CALCIUM SERPL-MCNC: 7.9 MG/DL (ref 8.3–10.1)
CHLORIDE SERPL-SCNC: 103 MMOL/L (ref 100–108)
CO2 SERPL-SCNC: 28 MMOL/L (ref 21–32)
CREAT SERPL-MCNC: 2.2 MG/DL (ref 0.6–1.3)
EOSINOPHIL # BLD AUTO: 0.07 THOUSAND/ΜL (ref 0–0.61)
EOSINOPHIL NFR BLD AUTO: 1 % (ref 0–6)
ERYTHROCYTE [DISTWIDTH] IN BLOOD BY AUTOMATED COUNT: 14.2 % (ref 11.6–15.1)
EST. AVERAGE GLUCOSE BLD GHB EST-MCNC: 148 MG/DL
GFR SERPL CREATININE-BSD FRML MDRD: 27 ML/MIN/1.73SQ M
GLUCOSE SERPL-MCNC: 113 MG/DL (ref 65–140)
GLUCOSE SERPL-MCNC: 131 MG/DL (ref 65–140)
GLUCOSE SERPL-MCNC: 169 MG/DL (ref 65–140)
GLUCOSE SERPL-MCNC: 295 MG/DL (ref 65–140)
HBA1C MFR BLD: 6.8 %
HCT VFR BLD AUTO: 38.7 % (ref 36.5–49.3)
HGB BLD-MCNC: 12.3 G/DL (ref 12–17)
IMM GRANULOCYTES # BLD AUTO: 0.01 THOUSAND/UL (ref 0–0.2)
IMM GRANULOCYTES NFR BLD AUTO: 0 % (ref 0–2)
LYMPHOCYTES # BLD AUTO: 1.07 THOUSANDS/ΜL (ref 0.6–4.47)
LYMPHOCYTES NFR BLD AUTO: 19 % (ref 14–44)
MCH RBC QN AUTO: 33.1 PG (ref 26.8–34.3)
MCHC RBC AUTO-ENTMCNC: 31.8 G/DL (ref 31.4–37.4)
MCV RBC AUTO: 104 FL (ref 82–98)
MONOCYTES # BLD AUTO: 0.64 THOUSAND/ΜL (ref 0.17–1.22)
MONOCYTES NFR BLD AUTO: 12 % (ref 4–12)
MRSA NOSE QL CULT: NORMAL
NEUTROPHILS # BLD AUTO: 3.77 THOUSANDS/ΜL (ref 1.85–7.62)
NEUTS SEG NFR BLD AUTO: 68 % (ref 43–75)
NRBC BLD AUTO-RTO: 0 /100 WBCS
PLATELET # BLD AUTO: 126 THOUSANDS/UL (ref 149–390)
PMV BLD AUTO: 9.8 FL (ref 8.9–12.7)
POTASSIUM SERPL-SCNC: 4.2 MMOL/L (ref 3.5–5.3)
PROCALCITONIN SERPL-MCNC: 0.1 NG/ML
RBC # BLD AUTO: 3.72 MILLION/UL (ref 3.88–5.62)
SODIUM SERPL-SCNC: 137 MMOL/L (ref 136–145)
WBC # BLD AUTO: 5.58 THOUSAND/UL (ref 4.31–10.16)

## 2021-01-07 PROCEDURE — 82948 REAGENT STRIP/BLOOD GLUCOSE: CPT

## 2021-01-07 PROCEDURE — 80048 BASIC METABOLIC PNL TOTAL CA: CPT | Performed by: INTERNAL MEDICINE

## 2021-01-07 PROCEDURE — 84145 PROCALCITONIN (PCT): CPT | Performed by: NURSE PRACTITIONER

## 2021-01-07 PROCEDURE — 85025 COMPLETE CBC W/AUTO DIFF WBC: CPT | Performed by: INTERNAL MEDICINE

## 2021-01-07 PROCEDURE — 83036 HEMOGLOBIN GLYCOSYLATED A1C: CPT | Performed by: INTERNAL MEDICINE

## 2021-01-07 PROCEDURE — 97163 PT EVAL HIGH COMPLEX 45 MIN: CPT

## 2021-01-07 PROCEDURE — 99239 HOSP IP/OBS DSCHRG MGMT >30: CPT | Performed by: INTERNAL MEDICINE

## 2021-01-07 PROCEDURE — 97167 OT EVAL HIGH COMPLEX 60 MIN: CPT

## 2021-01-07 PROCEDURE — 97110 THERAPEUTIC EXERCISES: CPT

## 2021-01-07 RX ORDER — ACETAMINOPHEN 325 MG/1
650 TABLET ORAL EVERY 6 HOURS PRN
Refills: 0
Start: 2021-01-07

## 2021-01-07 RX ORDER — TIMOLOL MALEATE 5 MG/ML
1 SOLUTION/ DROPS OPHTHALMIC DAILY
Start: 2021-01-07

## 2021-01-07 RX ORDER — ASCORBIC ACID 500 MG
500 TABLET ORAL DAILY
Refills: 0
Start: 2021-01-08

## 2021-01-07 RX ORDER — DOXYCYCLINE HYCLATE 100 MG/1
100 CAPSULE ORAL EVERY 12 HOURS SCHEDULED
Qty: 10 CAPSULE | Refills: 0 | Status: SHIPPED | OUTPATIENT
Start: 2021-01-07 | End: 2021-01-12

## 2021-01-07 RX ORDER — GINSENG 100 MG
1 CAPSULE ORAL 2 TIMES DAILY
Qty: 15 G | Refills: 0 | Status: SHIPPED | OUTPATIENT
Start: 2021-01-07 | End: 2022-01-09 | Stop reason: HOSPADM

## 2021-01-07 RX ORDER — CEPHALEXIN 250 MG/1
250 CAPSULE ORAL EVERY 8 HOURS SCHEDULED
Qty: 15 CAPSULE | Refills: 0 | Status: SHIPPED | OUTPATIENT
Start: 2021-01-07 | End: 2021-01-12

## 2021-01-07 RX ADMIN — SITAGLIPTIN 25 MG: 25 TABLET, FILM COATED ORAL at 08:20

## 2021-01-07 RX ADMIN — APIXABAN 2.5 MG: 2.5 TABLET, FILM COATED ORAL at 08:19

## 2021-01-07 RX ADMIN — INSULIN LISPRO 3 UNITS: 100 INJECTION, SOLUTION INTRAVENOUS; SUBCUTANEOUS at 12:41

## 2021-01-07 RX ADMIN — ISOSORBIDE MONONITRATE 30 MG: 30 TABLET, EXTENDED RELEASE ORAL at 08:19

## 2021-01-07 RX ADMIN — OXYCODONE HYDROCHLORIDE AND ACETAMINOPHEN 500 MG: 500 TABLET ORAL at 08:19

## 2021-01-07 RX ADMIN — ALLOPURINOL 100 MG: 100 TABLET ORAL at 08:19

## 2021-01-07 RX ADMIN — Medication 1000 UNITS: at 08:20

## 2021-01-07 RX ADMIN — INSULIN LISPRO 1 UNITS: 100 INJECTION, SOLUTION INTRAVENOUS; SUBCUTANEOUS at 08:20

## 2021-01-07 RX ADMIN — AMLODIPINE BESYLATE 2.5 MG: 2.5 TABLET ORAL at 08:20

## 2021-01-07 RX ADMIN — ACETAMINOPHEN 650 MG: 325 TABLET, FILM COATED ORAL at 08:19

## 2021-01-07 RX ADMIN — BACITRACIN ZINC 1 LARGE APPLICATION: 500 OINTMENT TOPICAL at 09:00

## 2021-01-07 RX ADMIN — TIMOLOL MALEATE 1 DROP: 5 SOLUTION/ DROPS OPHTHALMIC at 08:24

## 2021-01-07 RX ADMIN — FUROSEMIDE 40 MG: 40 TABLET ORAL at 08:20

## 2021-01-07 RX ADMIN — Medication 1 TABLET: at 08:19

## 2021-01-07 RX ADMIN — CARVEDILOL 6.25 MG: 6.25 TABLET, FILM COATED ORAL at 08:20

## 2021-01-07 RX ADMIN — CEFAZOLIN SODIUM 1000 MG: 1 SOLUTION INTRAVENOUS at 05:08

## 2021-01-07 NOTE — CASE MANAGEMENT
LOS: 1 DAY  UNPLANNED RA RISK SCORE: 16  RA: NO  BUNDLE: NO    CM met with patient and discussed discharge planning, available services and the role of CM  Patient states he is independent of all ADL's and ambulates independently  No DME  He still drives and provides his own ride to appointments  He lives with his son Rl Nieto who provides him with transportation to appointments  They live in a house and there are no steps in or to his bedroom  Patient has medication coverage and can affored all meds  He uses the [facility] in [place]  Patient does not have a POA or LW and is not interested in information at this time  He denies any h/o MI or D&A abuse in the past or at present  Patient will need to follow up with the [facility] once a week and wound care at home for dressing changes  Initially there was a consult to [facility]NA but family is declining at this time  Patients areli Nieto will be coming in today to learn how to do it  Rl Nieto is aware about patient needing to go to the [facility] once a week and he states he can transport patient  EL spoke with Dr Aleshia Brownlee regarding VNA and he says it is fine for patient to have son do it instead of [facility] if he is willing  CM called [facility] extention [phone] and spoke with [facility]  She states CM should call [phone] option 1 to set up new cases  She states to leave a message and they will f/u with patient to schedule appointment within 48 hours  EL requested Dr Aleshia Brownlee to put in consult for wound care  Patients areli Estradahunter Gerson was made aware the wound care center will contact him within 48 hours to set up appointment  Patient will have a ride home with his son Seanhunter Gerson

## 2021-01-07 NOTE — DISCHARGE SUMMARY
Discharge Summary - [address] [facility]    Patient Information: Alicja Jaramillo 78 y o  male MRN: [id number]  Unit/Bed#: [address] [place] [address] Encounter: [id number]    Discharging Physician / Practitioner: Silvano Pineda MD  PCP: Tyesha Black DO  Admission Date: 1/5/2021  Discharge Date: 01/07/21    Reason for Admission: Hand Injury (Accompanied by son who states sent by PCP for left hand infection  Large soiled dressing in place  Son states patient fell on a slippery step and cut top of left hand ( describes a skin tear ) and has been " doctoring it himself " and not changing dressing frequently  Patient continues to work delivering oil   )      Discharge Diagnoses:     Principal Problem:    Cellulitis of left upper extremity  Active Problems:    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 (HCC)    Chronic systolic congestive heart failure (HCC)    Atrial fibrillation (HCC)    Essential hypertension    CKD (chronic kidney disease) stage 4, GFR 15-29 ml/min ([facility])  Resolved Problems:    * No resolved hospital problems  *        * Cellulitis of left upper extremity  Assessment & Plan  Patient was walking up the stairs at work and fell forward on [place] striking his hand and cutting it  He has been working with oil since, placing dry dressings daily  Evaluated his PCP  for routine follow up and was referred to the ER for evaluation  Noted to have erythema and pain  Denied fevers or chills, bloody discharge from wound  Patient has decreased range of motion/contracture at baseline  Afebrile hemodynamically stable  Labs with no leukocytosis  Xray revealed  No acute changes, soft tissue swelling  Case was discussed with Dr Norma Mukherjee (hand) by ED who recommended admission with IV ABT, bacitracin/xeroform dressing changes  Patient was seen by Hand surgery and wound care  Recommended local wound care and antibiotics    Remains afebrile with normal white count  Overall erythema and swelling is improved  denies pain  · Treated IV cefazolin, will transition to Keflex and doxycycline at discharge to complete 7 day  · MRSA surveillance-pending  · Follow-up blood culture-negative so far  · Continue wound care-family was educated  · Patient will follow up with [address] [place] [address]  · Patient was advised to keep wound clean and dry  Continue wound care and antibiotics  Follow-up with PCP and [address] [place] [address]  Atrial fibrillation [facility])  Assessment & Plan  Controlled  Continue carvedilol and Eliquis      Chronic systolic congestive heart failure (HCC)  Assessment & Plan  Wt Readings from Last 3 Encounters:   01/07/21 74 kg (163 lb 2 3 oz)   10/29/20 72 6 kg (160 lb)   09/22/20 71 2 kg (157 lb)   Last EF 40%, follows with Dr Melissa Ross as an outpatient  Patient does have leg edema which he reports to be chronic but lungs are clear saturating adequately on room air and lying flat  Remains euvolemic  · Continue coreg, lasix  · Daily weights-stable    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 [facility])  Assessment & Plan  Lab Results   Component Value Date    HGBA1C 6 8 (H) 01/07/2021       Recent Labs     01/06/21  1616 01/06/21 2031 01/07/21  0140 01/07/21  0716   POCGLU 157* 123 113 169*       Blood Sugar Average: Last 72 hrs:  (P) 136   Acceptable  Continue home dose of Amaryl  Family denies any history of hypoglycemia  Resume Januvia but changed to renally adjusted dose at the time of discharge        CKD (chronic kidney disease) stage 4, GFR 15-29 ml/min [facility])  Assessment & Plan  Lab Results   Component Value Date    EGFR 27 01/07/2021    EGFR 28 01/06/2021    EGFR 26 01/05/2021    CREATININE 2 20 (H) 01/07/2021    CREATININE 2 11 (H) 01/06/2021    CREATININE 2 29 (H) 01/05/2021   Cr baseline appears to be in the 2 range  Cr today 2 29  Remains stable    Essential hypertension  Assessment & Plan  Continue norvasc, coreg, Imdur  Monitor      Consultations During Hospital Stay:  IP CONSULT TO CASE MANAGEMENT  IP CONSULT TO HAND SURGERY    Procedures Performed:     · None    Significant Findings:     · Refer to hospital course and above listed diagnosis related plan for details    Imaging while in hospital:    Xr Wrist 3+ Views Left    Result Date: 1/5/2021  Narrative: LEFT WRIST INDICATION:   Left hand pain after fall  Patient accompanied by son who states sent by PCP for left hand infection  COMPARISON:  None VIEWS:  XR WRIST 3+ VW LEFT Images: 5 FINDINGS: There is no acute fracture or dislocation  Old distal radial metaphyseal fracture deformity with positive ulnar variance  The distal ulna is irregular, presumably also reflecting old trauma  There is spurring of the lateral distal ulna with faint sclerosis of the adjacent lunate suggestive of ulnar impaction syndrome  No focal erosions  No lytic or blastic osseous lesion  Soft tissue swelling about the wrist and dorsal aspect of the hand  No soft tissue gas appreciated  Impression: No acute osseous abnormality  Soft tissue swelling  Chronic changes as described  Workstation performed: [id number]     Xr Hand 3+ Views Left    Result Date: 1/5/2021  Narrative: LEFT HAND INDICATION: Left hand pain after fall  Patient accompanied by son who states sent by PCP for left hand infection  COMPARISON:  None VIEWS:  XR HAND 3+ VW LEFT IMAGES:  3 For the purposes of institution wide universal language the following terms will apply: (thumb=1st digit/finger, index finger=2nd digit/finger, long finger=3rd digit/finger, ring=4th digit/finger and small finger=5th digit/finger) FINDINGS: Evaluation of the phalanges is limited on the PA view due to flexion of the fingers  There is no acute fracture or dislocation  Old distal radial metaphyseal fracture deformity with positive ulnar variance  The distal ulna is irregular, presumably also reflecting old trauma    There is spurring of the lateral distal ulna with faint sclerosis of the adjacent lunate which could represent ulnar impaction syndrome  No focal erosions  No lytic or blastic osseous lesion  Mild dorsal soft tissue swelling about the hand  No soft tissue gas appreciated  Impression: No acute osseous abnormality  Dorsal soft tissue swelling  Chronic changes as described  Workstation performed: [id number]       Incidental Findings:   · None    Test Results Pending at Discharge (will require follow up):   · As per After Visit Summary     Outpatient Tests Requested:  · None    Complications:  Refer to hospital course and above listed diagnosis related plan, if any    Hospital Course: As per HPI  Judie Bajwa is a 78 y o  male patient with past better hypertension, dyslipidemia, non-insulin-dependent diabetes mellitus, CKD, AFib, chronic systolic CHF who originally presented to the hospital on 1/5/2021 due to  hand wound  Patient stated he was walking up the stairs on [place] when he fell forward striking his hand on the metal stairs cutting it  He did not sustain any other injuries, no head injury or loss of consciousness  Since then he has been placing dry dressings on it daily  He has continued to work with oil daily  On the day of admission, he went for routine follow-up appointment with his primary care provider who thought the wound looked infected and sent into the ER  On arrival to the ER patient had labs which were at baseline  X-ray revealed no evidence of gas or osteo  Case was discussed with Hand on-call who recommended admission to [facility] with IV antibiotics      Please see above list of diagnoses and related plan for additional information  Condition at Discharge: stable     Discharge Day Visit / Exam:     Subjective:  Feels well  Denies any fever chills  Denies any pain associated with wound    Overall swelling has improved with improvement in erythema    Vitals: Blood Pressure: 162/78 (01/07/21 8459)  Pulse: 86 (01/07/21 0814)  Temperature: 98 8 °F (37 1 °C) (01/06/21 2322)  Temp Source: Tympanic (01/05/21 1854)  Respirations: 18 (01/06/21 2322)  Height: 5' 9" (175 3 cm) (01/05/21 2227)  Weight - Scale: 74 kg (163 lb 2 3 oz) (01/07/21 0600)  SpO2: 100 % (01/07/21 0814) on room air  Exam:   Physical Exam  Constitutional:       General: He is not in acute distress  HENT:      Head: Normocephalic and atraumatic  Neck:      Musculoskeletal: Normal range of motion and neck supple  Cardiovascular:      Rate and Rhythm: Normal rate  Pulmonary:      Effort: Pulmonary effort is normal  No respiratory distress  Breath sounds: No wheezing, rhonchi or rales  Chest:      Chest wall: No tenderness  Abdominal:      General: Bowel sounds are normal  There is no distension  Palpations: Abdomen is soft  Tenderness: There is no abdominal tenderness  There is no guarding or rebound  Musculoskeletal:      Right lower leg: Edema present  Left lower leg: Edema present  Comments: Left hand wound appears superficial , surrounding erythema and swelling is improving  No significant purulence/ discharge noted  Bilateral hand contracture noted  Bilateral lower extremity edema (chronic unchanged)    Skin:     General: Skin is warm and dry  Findings: No rash  Neurological:      Mental Status: He is alert  Mental status is at baseline  Cranial Nerves: No cranial nerve deficit  Discharge instructions/Information to patient and family:(Discharge Medications and Follow up):   See after visit summary for information provided to patient and family  Provisions for Follow-Up Care:  See after visit summary for information related to follow-up care and any pertinent home health orders  Disposition: Home with home healthcare    Planned Readmission:  No     Discharge Statement:  I spent 45 minutes discharging the patient  This time was spent on the day of discharge   I had direct contact with the patient on the day of discharge  Greater than 50% of the total time was spent examining patient, answering all patient questions, arranging and discussing plan of care with patient as well as directly providing post-discharge instructions  Additional time then spent on discharge activities  Coordinated with case management  Discussed with patient's son over the phone  Discharge Medications:  See after visit summary for reconciled discharge medications provided to patient and family  ** Please Note: "This note has been constructed using a voice recognition system  Therefore there may be syntax, spelling, and/or grammatical errors   Please call if you have any questions  "**

## 2021-01-07 NOTE — PHYSICAL THERAPY NOTE
PT EVALUATION       01/07/21 1055   PT Last Visit   PT Visit Date 01/07/21   Note Type   Note type Evaluation   Pain Assessment   Pain Assessment Tool Pain Assessment not indicated - pt denies pain   Home Living   Type of Home House  (bilevel)   Home Layout Two level; Able to live on main level with bedroom/bathroom  (no MOHINDER)   Home Equipment   (no DME per pt)   Prior Function   Level of [place] Independent with ADLs and functional mobility   Lives With Family  (son and RHIANNA)   Receives Help From Family   ADL Assistance Independent   Comments Pt amb w/out AD PTA   Restrictions/Precautions   Other Precautions Fall Risk;Bed Alarm; Chair Alarm   General   Additional Pertinent History Pt adm with a left hand wound  Cognition   Overall Cognitive Status WFL   Arousal/Participation Cooperative   Orientation Level Oriented X4   Following Commands Follows all commands and directions without difficulty   RLE Assessment   RLE Assessment WFL  (4-/5)   LLE Assessment   LLE Assessment WFL  (4-/5)   Transfers   Sit to Stand 4  Minimal assistance   Additional items Assist x 1;Verbal cues   Stand to Sit 5  Supervision   Additional items Verbal cues   Ambulation/Elevation   Gait pattern   (unsteady initially;balance improved with increased gait)   Gait Assistance 4  Minimal assist   Additional items Assist x 1;Verbal cues; Tactile cues   Assistive Device None   Distance 60 feet with change in direction   Balance   Static Sitting Good   Static Standing Fair -   Dynamic Standing Fair -   Ambulatory Poor +   Activity Tolerance   Activity Tolerance Patient limited by fatigue  (unsteadiness)   Assessment   Problem List Decreased strength;Decreased range of motion;Decreased endurance; Impaired balance;Decreased mobility; Decreased coordination;Decreased safety awareness;Decreased skin integrity   Assessment Patient seen for Physical Therapy evaluation  Patient admitted with Cellulitis of left upper extremity    Comorbidities affecting patient's physical performance include: HTN, DM2, CHF, CKD, A-Fib, leg edema  Personal factors affecting patient at time of initial evaluation include: lives in two story house and stairs to enter home  Prior to admission, patient was independent with functional mobility without assistive device, independent with ADLS, living with family in a two level home with no steps to enter, ambulating household distance, ambulating community distances and lives in a multilevel house but has 1st floor setup  Please find objective findings from Physical Therapy assessment regarding body systems outlined above with impairments and limitations including weakness, decreased ROM, impaired balance, decreased endurance, impaired coordination, gait deviations, decreased activity tolerance, decreased functional mobility tolerance, decreased safety awareness and fall risk  The Barthel Index was used as a functional outcome tool presenting with a score of 45 today indicating marked limitations of functional mobility and ADLS  Patient's clinical presentation is currently unstable/unpredictable as seen in patient's presentation of vital sign response, changing level of pain, increased fall risk, new onset of impairment of functional mobility, decreased endurance and new onset of weakness  Pt would benefit from continued Physical Therapy treatment to address deficits as defined above and maximize level of functional mobility  As demonstrated by objective findings, the assigned level of complexity for this evaluation is high     Goals   Patient Goals go home   STG Expiration Date 01/14/21   Short Term Goal #1 bed mob - S; trans - S   Short Term Goal #2 pt will amb w/out AD functional household distances - S; balance - F/F+ for safe gait and mobility   LTG Expiration Date 01/21/21   Long Term Goal #1 pt will return to independent functional mobility, home and community, levels and stairs   Long Term Goal #2 balance - F+/G for gait and mobility; strength LEs - 4/5   Plan   Treatment/Interventions ADL retraining;Functional transfer training;LE strengthening/ROM; Elevations; Therapeutic exercise; Endurance training;Patient/family training;Equipment eval/education; Bed mobility;Gait training; Compensatory technique education   PT Frequency 5x/wk   Recommendation   PT Discharge Recommendation Return to previous environment with social support   Barthel Index   Feeding 5   Bathing 0   Grooming Score 0   Dressing Score 5   Bladder Score 10   Bowels Score 10   Toilet Use Score 5   Transfers (Bed/Chair) Score 10   Mobility (Level Surface) Score 0   Stairs Score 0   Barthel Index Score 39   Licensure   [place] License Number  Velasquez Gutierrez [id number]     Time In:1055  Time Out:1105  Total Time: 10 mins      S:  "I think I might go home today"  O:  OT assisted pt to don his bilateral boots  Pt trans sit to stand with S/min A and amb with S/min A x 60 feet with change in direction  Pt trans stand to sit with S  Pt also amb 10 feet to and from bathroom, trans to and from toilet with S/min A and S to stand at sink and wash his hands  A:  Pt is noted with improved gait and balance with his boots on vs slipper socks  Pt will benefit from cont skilled PT services to return pt to his prior functional level  P:  Cont per PT POC  DCP - home with family      Velasquez Gutierrez   [id number]

## 2021-01-07 NOTE — OCCUPATIONAL THERAPY NOTE
OT EVALUATION       01/07/21 1110   Note Type   Note type Evaluation   Restrictions/Precautions   Other Precautions Chair Alarm; Bed Alarm; Fall Risk   Pain Assessment   Pain Assessment Tool Pain Assessment not indicated - pt denies pain   Home Living   Type of [address] Two level; Able to live on main level with bedroom/bathroom  (bilevel, no MOHINDER)   Bathroom Shower/Tub Tub/shower unit   Prior Function   Level of [place] Independent with ADLs and functional mobility   Lives With Family; [place] Help From Family   ADL Assistance Independent   ADL   Eating Assistance 7  Independent   Grooming Assistance 5  Supervision/Setup   UB Bathing Assistance 5  Supervision/Setup   LB Bathing Assistance 4  Minimal Assistance   UB Dressing Assistance 5  Supervision/Setup   LB Dressing Assistance 4  Minimal Assistance   Toileting Assistance  5  Supervision/Setup   Transfers   Sit to Stand 5  Supervision   Stand to Sit 5  Supervision   Toilet transfer 5  Supervision   Functional Mobility   Functional Mobility 5  Supervision   Additional Comments 25 feet   Additional items   (with pts shoes)   Balance   Static Sitting Good   Dynamic Sitting Fair +   Static Standing Fair   Dynamic Standing Fair -   Activity Tolerance   Activity Tolerance Patient limited by fatigue   RUE Assessment   RUE Assessment   (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, shoulder, 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)   LUE Overall AROM   L Shoulder Flexion WFL shoulder and elbow, MMT 4-/5    L Wrist Flexion grossly 20 degrees flexion and extension with slight discomfort, ACE wrap to L wrist   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, MMT 3/5   Cognition   Overall Cognitive Status WFL   Arousal/Participation Cooperative   Attention Within functional limits   Orientation Level Oriented X4   Following Commands Follows all commands and directions without difficulty   Assessment   Limitation Decreased ADL status; Decreased UE ROM; Decreased UE strength;Decreased Safe judgement during ADL;Decreased endurance;Decreased self-care trans;Decreased high-level ADLs  (decreased balance and mobility )   Prognosis Good   Assessment Patient evaluated by Occupational Therapy  Patient admitted with Cellulitis of left upper extremity  The patients occupational profile, medical and therapy history includes a extensive additional review of physical, cognitive, or psychosocial history related to current functional performance  Comorbidities affecting functional mobility and ADLS include: 7-4M/TG  Prior to admission, patient was independent with functional mobility without assistive device and independent with ADLS  The evaluation identifies the following performance deficits: weakness, impaired balance, decreased endurance, increased fall risk, new onset of impairment of functional mobility, decreased ADLS, decreased IADLS, decreased activity tolerance, decreased safety awareness, impaired judgement and decreased strength, that result in activity limitations and/or participation restrictions  This evaluation requires clinical decision making of high complexity, because the patient presents with comorbidites that affect occupational performance and required significant modification of tasks or assistance with consideration of multiple treatment options  The Barthel Index was used as a functional outcome tool presenting with a score of 45, indicating marked limitations of functional mobility and ADLS  Patient will benefit from skilled Occupational Therapy services to address above deficits and facilitate a safe return to prior level of function     Goals   Patient Goals go home   STG Time Frame   (1-7 days)   Short Term Goal  Goals established to promote patient goal of going home:  Patient will increase standing tolerance to 3 minutes during ADL task to decrease assistance level and decrease fall risk;  Patient will increase functional mobility to and from bathroom with no assistive device independently to increase performance with ADLS and to use a toilet; Patient will tolerate 10 minutes of UE ROM/strengthening to increase general activity tolerance and performance in ADLS/IADLS; Patient will improve functional activity tolerance to 10 minutes of sustained functional tasks to increase participation in basic self-care and decrease assistance level;  ;   Patient will increase dynamic standing balance to fair to improve postural stability and decrease fall risk during standing ADLS and transfers  LTG Time Frame   (8-14 days)   Long Term Goal Goals established to promote patient goal of going home:  Patient will increase standing tolerance to 6 minutes during ADL task to decrease assistance level and decrease fall risk;   Patient will tolerate 20 minutes of UE ROM/strengthening to increase general activity tolerance and performance in ADLS/IADLS; Patient will improve functional activity tolerance to 20 minutes of sustained functional tasks to increase participation in basic self-care and decrease assistance level;  Patient will increase dynamic standing balance to fair+ to improve postural stability and decrease fall risk during standing ADLS and transfers  Functional Transfer Goals   Pt Will Perform All Functional Transfers   (STG independent )   ADL Goals   Pt Will Perform Grooming   (STG independent )   Pt Will Perform Bathing   (STG supervision LTG Independent )   Pt Will Perform UE Dressing   (STG independent )   Pt Will Perform LE Dressing   (STG supervision LTG independent )   Pt Will Perform Toileting   (STG independent )   Plan   Treatment Interventions ADL retraining;Functional transfer training;UE strengthening/ROM; Endurance training;Patient/family training;Equipment evaluation/education; Activityengagement; Compensatory technique education   Goal Expiration Date 01/21/21   OT Frequency 3-5x/wk   Recommendation   OT Discharge Recommendation Return to previous environment with social support   Barthel Index   Feeding 5   Bathing 0   Grooming Score 0   Dressing Score 5   Bladder Score 10   Bowels Score 10   Toilet Use Score 5   Transfers (Bed/Chair) Score 10   Mobility (Level Surface) Score 0   Stairs Score 0   Barthel Index Score 45   Licensure   [place] License Number  David Britney Brasher Gideon [id number] [id number]

## 2021-01-07 NOTE — DISCHARGE INSTR - ACTIVITY
remove dry gauze dressing by liberally applying normal saline to prevent further tissue injury, then cleanse wound with normal saline  Roll partial skin flap down over wound using a saline moistened cotton tip applicator and then apply Xeroform in a single layer  Cover with dry gauze and secure with gauze roll  Change once a day and prn      2  For skin tear on left lower leg, anterior aspect: Cleanse with normal saline  Cover with Dermagran (small size)  Unfold square and place in a single layer over wound  Then cover with dry gauze  Secure with gauze roll and tape  Change every other day and prn  3  Moisturize skin daily

## 2021-01-07 NOTE — PLAN OF CARE
Problem: Potential for Falls  Goal: Patient will remain free of falls  Description: INTERVENTIONS:  - Assess patient frequently for physical needs  -  Identify cognitive and physical deficits and behaviors that affect risk of falls  -  [place] fall precautions as indicated by assessment   - Educate patient/family on patient safety including physical limitations  - Instruct patient to call for assistance with activity based on assessment  - Modify environment to reduce risk of injury  - Consider OT/PT consult to assist with strengthening/mobility  Outcome: Progressing - Patient has call bell and bedside table within reach, the bed alarm is on and all other fall precautions are in place  Problem: Prexisting or High Potential for Compromised Skin Integrity  Goal: Skin integrity is maintained or improved  Description: INTERVENTIONS:  - Identify patients at risk for skin breakdown  - Assess and monitor skin integrity  - Assess and monitor nutrition and hydration status  - Monitor labs   - Assess for incontinence   - Turn and reposition patient  - Assist with mobility/ambulation  - Relieve pressure over bony prominences  - Avoid friction and shearing  - Provide appropriate hygiene as needed including keeping skin clean and dry  - Evaluate need for skin moisturizer/barrier cream  - Collaborate with interdisciplinary team   - Patient/family teaching  - Consider wound care consult   Outcome: Progressing - Wound care is consulted  Problem: Nutrition/Hydration-ADULT  Goal: Nutrient/Hydration intake appropriate for improving, restoring or maintaining nutritional needs  Description: Monitor and assess patient's nutrition/hydration status for malnutrition  Collaborate with interdisciplinary team and initiate plan and interventions as ordered  Monitor patient's weight and dietary intake as ordered or per policy  Utilize nutrition screening tool and intervene as necessary   Determine patient's food preferences and provide high-protein, high-caloric foods as appropriate       INTERVENTIONS:  - Monitor oral intake, urinary output, labs, and treatment plans  - Assess nutrition and hydration status and recommend course of action  - Evaluate amount of meals eaten  - Assist patient with eating if necessary   - Allow adequate time for meals  - Recommend/ encourage appropriate diets, oral nutritional supplements, and vitamin/mineral supplements  - Order, calculate, and assess calorie counts as needed  - Assess need for intravenous fluids  - Provide specific nutrition/hydration education as appropriate  - Include patient/family/caregiver in decisions related to nutrition  Outcome: Progressing

## 2021-01-07 NOTE — DISCHARGE INSTRUCTIONS
Cephalexin (By mouth)   Cephalexin (lnt-p-CNW-in)  Treats infections  This medicine is a cephalosporin antibiotic  Brand Name(s): Keflex   There may be other brand names for this medicine  When This Medicine Should Not Be Used: This medicine is not right for everyone  Do not use this medicine if you had an allergic reaction to cephalexin or another cephalosporin medicine  How to Use This Medicine:   Capsule, Tablet, Tablet for Suspension, Liquid  · Your doctor will tell you how much medicine to use  Do not use more than directed  · Read and follow the patient instructions that come with this medicine  Talk to your doctor or pharmacist if you have any questions  · You may take your medicine with food or milk to avoid stomach upset  · Oral liquid: Shake well just before use  Measure the oral liquid medicine with a marked measuring spoon, oral syringe, or medicine cup  · Take all of the medicine in your prescription to clear up your infection, even if you feel better after the first few doses  · Missed dose: Take a dose as soon as you remember  If it is almost time for your next dose, wait until then and take a regular dose  Do not take extra medicine to make up for a missed dose  · Capsule or tablet: Store at room temperature away from heat, moisture, and direct light  · Oral liquid: Store in the refrigerator for 14 days  After 14 days, throw away any unused medicine  Do not freeze  Drugs and Foods to Avoid:   Ask your doctor or pharmacist before using any other medicine, including over-the-counter medicines, vitamins, and herbal products  · Some medicines and foods can affect how cephalexin works  Tell your doctor if you are also using  ? Metformin  ? Probenecid  Warnings While Using This Medicine:   · Tell your doctor if you are pregnant or breastfeeding, or if you have kidney disease, liver disease, or a history of digestive problems, such as colitis   Tell your doctor if you are allergic to penicillin  · This medicine can cause diarrhea  Call your doctor if the diarrhea becomes severe, does not stop, or is bloody  Do not take any medicine to stop diarrhea until you have talked to your doctor  Diarrhea can occur 2 months or more after you stop taking this medicine  · Tell any doctor or dentist who treats you that you are using this medicine  This medicine may affect certain medical test results  · Call your doctor if your symptoms do not improve or if they get worse  · Keep all medicine out of the reach of children  Never share your medicine with anyone  Possible Side Effects While Using This Medicine:   Call your doctor right away if you notice any of these side effects:  · Allergic reaction: Itching or hives, swelling in your face or hands, swelling or tingling in your mouth or throat, chest tightness, trouble breathing  · Blistering, peeling, red skin rash  · Severe diarrhea, especially if bloody or ongoing  · Severe stomach pain, vomiting  If you notice these less serious side effects, talk with your doctor:   · Mild diarrhea or nausea  If you notice other side effects that you think are caused by this medicine, tell your doctor  Call your doctor for medical advice about side effects  You may report side effects to FDA at 5-870-FDA-3209  © Copyright [address] Information is for End User's use only and may not be sold, redistributed or otherwise used for commercial purposes  The above information is an  only  It is not intended as medical advice for individual conditions or treatments  Talk to your doctor, nurse or pharmacist before following any medical regimen to see if it is safe and effective for you  Doxycycline (By mouth)   Doxycycline (upn-v-OHP-klecaleb)  Treats and prevents infections  Also used to prevent malaria and treat rosacea or severe acne  This medicine is a tetracycline antibiotic     Brand Name(s): Acticlate, Adoxa, Adoxa Rafa 1/150, Avidoxy, Doryx, Doryx MPC, Mondoxyne NL, Monodox, Morgidox [id number], Morgidox [id number], O[place]bo, Oracea, Targadox, Vibramycin Calcium, Vibramycin Hyclate   There may be other brand names for this medicine  When This Medicine Should Not Be Used: This medicine is not right for everyone  Do not use it if you had an allergic reaction to doxycycline or another tetracycline antibiotic, or if you are pregnant or breastfeeding  How to Use This Medicine:   Capsule, Delayed Release Capsule, Long Acting Capsule, Liquid, Tablet, Delayed Release Tablet  · Your doctor will tell you how much medicine to use  Do not use more than directed  · Ask your pharmacist or doctor if you need to take this medicine with or without food  Some forms can be taken with food or milk, but others must be taken on an empty stomach  · Capsule: Swallow whole  Do not break, crush, chew, or open it  ? Oracea® capsules: This medicine must be taken on an empty stomach, at least 1 hour before or 2 hours after a meal   ? Acticlate® Cap capsules: You may take this medicine with food or milk to avoid stomach irritation  · Delayed-release capsules: You may also take this medicine by sprinkling the open capsules onto cold, soft applesauce  Do not lose any pellets when transferring the contents  Swallow this mixture right away  Do not chew it  Do not store the mixture for later use  You may take this medicine with food or milk to avoid stomach upset  · Delayed-release tablets: You may also take this medicine by sprinkling the broken tablets onto room-temperature applesauce  Swallow this mixture right away  Do not chew it  Do not store the mixture for later use  You may take this medicine with food or milk to avoid stomach upset  · Oral liquid: Shake the bottle well just before each use  Measure the oral liquid medicine with a marked measuring spoon, oral syringe, or medicine cup  · Tablets: You may take this medicine with food or milk to avoid stomach irritation  To break a tablet, hold the tablet between your thumb and index fingers close to the appropriate scored line  Then, apply enough pressure to snap the tablet segments apart  Do not use the tablet if it does not break on the scored lines  · Take all of the medicine in your prescription to clear up your infection, even if you feel better after the first few doses  · Drink plenty of fluids to avoid throat problems, if you take the capsule or tablet form  · Malaria prevention: Start taking the medicine 1 or 2 days before you travel  Take the medicine every day during your trip  Keep taking it for 4 weeks after you return  However, do not use the medicine for longer than 4 months  · Do not use this medicine for more than 9 months if you are using it for rosacea  · Use only the brand of medicine your doctor prescribed  Other brands may not work the same way  · Read and follow the patient instructions that come with this medicine  Talk to your doctor or pharmacist if you have any questions  · Missed dose: Take a dose as soon as you remember  If it is almost time for your next dose, wait until then and take a regular dose  Do not take extra medicine to make up for a missed dose  · Store the medicine in a closed container at room temperature, away from heat, moisture, and direct light  Do not freeze the oral liquid  Drugs and Foods to Avoid:   Ask your doctor or pharmacist before using any other medicine, including over-the-counter medicines, vitamins, and herbal products  · Some medicines can affect how doxycycline works  Tell your doctor if you are using any of the following:  ? Bismuth subsalicylate  ? Acne medicines (including isotretinoin)  ? Birth control pills  ? Blood thinner (including warfarin)  ? Medicine for seizures (including carbamazepine, phenobarbital, phenytoin)  ? Medicine that contains aluminum, calcium, magnesium, or iron (including an antacid or vitamin supplement)  ?  Medicine to treat psoriasis (including acitretin)  ? Penicillin antibiotic  ? Stomach medicine  Warnings While Using This Medicine:   · This medicine may cause birth defects if either partner is using it during conception or pregnancy  Tell your doctor right away if you or your partner becomes pregnant  Birth control pills may not work as well when used together with this medicine  Use a second form of birth control to keep from getting pregnant  · Tell your doctor if you have kidney disease, liver disease, asthma, or an allergy to sulfites  Tell your doctor if you had surgery on your stomach, or if you have a history of yeast infections  · This medicine may cause the following problems:  ? Permanent change in tooth color (in children younger than 6years of age)  ? Serious skin reactions, including drug reaction with eosinophilia and systemic symptoms (DRESS)  ? Increased pressure inside the head  ? Yeast infection  ? Immune system problems  · This medicine can cause diarrhea  Call your doctor if the diarrhea becomes severe, does not stop, or is bloody  Do not take any medicine to stop diarrhea until you have talked to your doctor  Diarrhea can occur 2 months or more after you stop taking this medicine  · This medicine may make your skin more sensitive to sunlight  Wear sunscreen  Do not use sunlamps or tanning beds  · Tell any doctor or dentist who treats you that you are using this medicine  This medicine may affect certain medical test results  · Call your doctor if your symptoms do not improve or if they get worse  · Your doctor will do lab tests at regular visits to check on the effects of this medicine  Keep all appointments  · Keep all medicine out of the reach of children  Never share your medicine with anyone    Possible Side Effects While Using This Medicine:   Call your doctor right away if you notice any of these side effects:  · Allergic reaction: Itching or hives, swelling in your face or hands, swelling or tingling in your mouth or throat, chest tightness, trouble breathing  · Blistering, peeling, red skin rash  · Burning, pain, or irritation in your upper stomach or throat  · Diarrhea that may contain blood  · Fever, chills, cough, runny or stuffy nose, sore throat, body aches  · Joint pain, unusual tiredness or weakness  · Severe headache, dizziness, vision changes  · Sudden and severe stomach pain, nausea, vomiting, lightheadedness  · Swollen, painful, or tender lymph glands in the neck, armpit, or groin, or yellow skin or eyes  If you notice these less serious side effects, talk with your doctor:   · Darkening of your skin, scars, teeth, or gums  · Sores or white patches on your lips, mouth, or throat  If you notice other side effects that you think are caused by this medicine, tell your doctor  Call your doctor for medical advice about side effects  You may report side effects to FDA at 0-426-FDA-4519  © Copyright [facility] [address] Information is for End User's use only and may not be sold, redistributed or otherwise used for commercial purposes  The above information is an  only  It is not intended as medical advice for individual conditions or treatments  Talk to your doctor, nurse or pharmacist before following any medical regimen to see if it is safe and effective for you  Bacitracin (On the skin)   Bacitracin (bas-i-TRAY-sin)  Prevents infection of minor cuts, burns, or scrapes  Brand Name(s): Antibiotic Ointment, Baciguent, Bacitraycin Plus, GRx Bacitracin Zinc, Quality Choice Bacitracin   There may be other brand names for this medicine  When This Medicine Should Not Be Used: This medicine is not right for everyone  Do not use it if you had an allergic reaction to bacitracin  How to Use This Medicine:   Ointment  · Your doctor will tell you how much medicine to use  Do not use more than directed    · Follow the instructions on the medicine label if you are using this medicine without a prescription  · Clean your wound before you apply this medicine  Apply a small amount to the wound 1 to 3 times each day  You may cover the wound with a clean bandage after you apply the medicine  · Do not use this medicine in your eyes or over large areas of your body  Do not use it for longer than 7 days in a row  · Missed dose: Take a dose as soon as you remember  If it is almost time for your next dose, wait until then and take a regular dose  Do not take extra medicine to make up for a missed dose  · Store the medicine in a closed container at room temperature, away from heat, moisture, and direct light  Drugs and Foods to Avoid:      Ask your doctor or pharmacist before using any other medicine, including over-the-counter medicines, vitamins, and herbal products  Warnings While Using This Medicine:   · Tell your doctor if you are pregnant or breastfeeding  · This medicine is for use only on minor cuts or burns  Do not use it on an animal bite or other puncture wound, a serious burn (with blistering), or a deep cut or skin injury unless directed by your doctor  · Call your doctor if you see signs of infection near your wound, such as swelling, warmth, redness, or pus  · Keep all medicine out of the reach of children  Never share your medicine with anyone  Possible Side Effects While Using This Medicine:   Call your doctor right away if you notice any of these side effects:  · Allergic reaction: Itching or hives, swelling in your face or hands, swelling or tingling in your mouth or throat, chest tightness, trouble breathing  If you notice these less serious side effects, talk with your doctor:   · Mild itching or skin rash  If you notice other side effects that you think are caused by this medicine, tell your doctor  Call your doctor for medical advice about side effects   You may report side effects to FDA at 0-756-FDA-6673  © Copyright [facility] [address] Information is for End User's use only and may not be sold, redistributed or otherwise used for commercial purposes  The above information is an  only  It is not intended as medical advice for individual conditions or treatments  Talk to your doctor, nurse or pharmacist before following any medical regimen to see if it is safe and effective for you  Acetaminophen (By mouth)   Acetaminophen (r-cieo-d-MIN-oh-fen)  Treats minor aches and pain and reduces fever  Brand Name(s): 7T Gummy ES, 8 Hour Pain Relief, 8HR Arthritis Pain Relief, 8HR Muscle Aches & Pain, Arthritis Pain Relief, CareALL Non-Aspirin, Cetafen, Cetafen Extra, Children's Cough & Sore Throat, Children's Dye Free Pain and Fever, Children's Fever Reducer & Pain Reliever, Children's Mapap, Children's Non-Aspirin, Children's Pain & Fever, Children's Pain Relief   There may be other brand names for this medicine  When This Medicine Should Not Be Used: This medicine is not right for everyone  Do not use it if you had an allergic reaction to acetaminophen  How to Use This Medicine:   Capsule, Liquid Filled Capsule, Liquid, Powder, Tablet, Chewable Tablet, Dissolving Tablet, Fizzy Tablet, Long Acting Tablet  · Your doctor will tell you how much medicine to use  Do not use more than directed  · If you are taking this medicine without the advice of your doctor, carefully read and follow the Drug Facts label and dosing instructions on the medicine package  Ask your doctor or pharmacist if you are not sure how to use this medicine  · Do not take this medicine for more than 10 days in a row, unless directed by your doctor  · The chewable tablet should be chewed or crushed before you swallow it  · Oral liquids: Measure the oral liquid medicine with a marked measuring spoon, oral syringe, or medicine cup  Do not use a spoon, syringe, or cup that came with a different medicine  · Oral liquid (with syringe):   ?  Shake the bottle well before each use   ? Remove the cap  Attach the syringe to the flow restrictor  Turn the bottle upside down  ? Pull back the syringe plunger until it is filled with the correct dose  Ask your doctor or pharmacist if you are not sure how much medicine to use  ? Slowly give the medicine into your child's mouth  Point the syringe so the medicine goes toward the inner cheek  · Oral liquid (with dropper):   ? Shake the bottle well before each use  ? Remove the cap  Insert the dropper into the bottle  Withdraw the correct dose  Ask your doctor or pharmacist if you are not sure how much medicine to use  ? Slowly give the medicine into your child's mouth  Point the dropper so the medicine goes toward the inner cheek  · Extended-release tablet: Swallow the extended-release tablet whole  Do not crush, break, or chew it  Take this medicine with a full glass of water  · Use only the brand of medicine your doctor prescribed  Other brands may not work the same way  · Missed dose: Take a dose as soon as you remember  If it is almost time for your next dose, wait until then and take a regular dose  Do not take extra medicine to make up for a missed dose  · Store the medicine in a closed container at room temperature, away from heat, moisture, and direct light  Drugs and Foods to Avoid:   Ask your doctor or pharmacist before using any other medicine, including over-the-counter medicines, vitamins, and herbal products  · Some medicines and foods can affect how acetaminophen works  Tell your doctor if you are taking a blood thinner, such as warfarin  · Do not drink alcohol while you are using this medicine  Acetaminophen can damage your liver, and alcohol can increase this risk  Do not take acetaminophen without asking your doctor if you have 3 or more drinks of alcohol every day  Warnings While Using This Medicine:   · Tell your doctor if you are pregnant or breastfeeding, or if you have kidney or liver disease   Tell your doctor if you have phenylketonuria (PKU)  Some brands of acetaminophen contain aspartame, which can make PKU worse  · This medicine contains acetaminophen  Read the labels of all other medicines you are using to see if they also contain acetaminophen, or ask your doctor or pharmacist  Rob Dupree not use more than 4 grams (4,000 milligrams) total of acetaminophen in one day  For Tylenol® Extra Strength, it is not safe to take more than 3 grams (3,000 milligrams) in 1 day  · Call your doctor if your symptoms do not improve or if they get worse  · Tell any doctor or dentist who treats you that you are using this medicine  This medicine may affect certain medical test results  · Keep all medicine out of the reach of children  Never share your medicine with anyone  Possible Side Effects While Using This Medicine:   Call your doctor right away if you notice any of these side effects:  · Allergic reaction: Itching or hives, swelling in your face or hands, swelling or tingling in your mouth or throat, chest tightness, trouble breathing  · Bloody or black, tarry stools  · Dark urine or pale stools, nausea, vomiting, loss of appetite, severe stomach pain, yellow skin or eyes  · Fever or a sore throat that lasts longer than 3 days, or pain that lasts longer than 5 days  · Lightheadedness, fainting, sweating, or weakness  · Unusual bleeding or bruising  · Vomiting blood or material that looks like coffee grounds  If you notice other side effects that you think are caused by this medicine, tell your doctor  Call your doctor for medical advice about side effects  You may report side effects to FDA at 3-767-FDA-0244  © Copyright [address] Information is for End User's use only and may not be sold, redistributed or otherwise used for commercial purposes  The above information is an  only  It is not intended as medical advice for individual conditions or treatments   Talk to your doctor, nurse or pharmacist before following any medical regimen to see if it is safe and effective for you  Acute Wounds   AMBULATORY CARE:   An acute wound  is an injury that causes a break in the skin  As your wound begins to heal, it is normal to have some swelling, pain, and redness  Your body's immune system is working to keep your wound from getting infected  Your wound may develop a scab  The scab protects your wound as it heals  Call 911 for the following:   · You suddenly have trouble breathing or have chest pain  Seek care immediately if:   · Blood soaks through your bandage  · You have pus or a foul odor coming from the wound  · Your stitches come apart or your wound reopens  Contact your healthcare provider if:   · You continue to have pain even after you have taken pain medicine  · You have muscle, joint, or body aches, sweating, or a fever  · You have increased swelling, redness, or bleeding in your wound  · Your skin is itchy, swollen, or you have a rash  · You have questions or concerns about your condition or care  Treatment for an acute wound  depends on how severe it is and where it is located  It may also depend on how long you have had the injury  Medicines may be given to treat or prevent infections  Medicines for pain may also be given  Wound care may include any of the following:  · Cleaning and debridement  is done to clean and remove objects, dirt, or dead tissues from the open wound  Your healthcare provider may use soap and water or a different solution to clean your wound  He or she may use a syringe to push the solution into all areas of your wound  The force from the syringe will help push out dirt  · Closure of the wound  is done with stitches, staples, skin adhesive, or other treatments  This may be done if the wound is wide or deep  Some wounds may need to be packed with wet gauze for some time before closure   Some wounds may not need closure at all to heal     Care for your wound as directed:  Acute wounds can be in different locations and caused by different injuries  Follow your healthcare provider's instructions on caring for your type of wound  The following care items are for most wounds:  · Keep your wound covered with a clean and dry bandage  Change your bandage if it becomes wet or dirty  This will decrease the risk for infection in your wound  Follow your healthcare provider's instructions for changing your dressing  · Do not soak in a tub or swim  until your healthcare provider says it is okay  Your wound may open if you get it too wet  Dirt from the water can also get into your wound and cause an infection  · Keep pets away from your wound  Pets carry germs that can cause a wound infection  · Do not pick or scratch scabs  Let scabs fall off on their own  You may damage new skin that is forming under the scab  You may have a worse scar after the damage  · Eat healthy foods and drink liquids as directed  Healthy foods give your body the nutrients it needs to heal your wound  Liquids prevent dehydration that can decrease the blood supply to your wound  Healthy foods include fruits, vegetables, grains (breads and cereals), dairy, and protein foods  Protein foods include meat, fish, nuts, and soy products  Protein, calories, vitamin C, and zinc help wounds heal  Ask your healthcare provider for more information about the foods you should eat to improve healing  Follow up with your healthcare provider as directed:  Write down your questions so you remember to ask them during your visit  © Copyright [address] Information is for End User's use only and may not be sold, redistributed or otherwise used for commercial purposes  All illustrations and images included in CareNotes® are the copyrighted property of [employer] D [employer]  or [address] [place] [address] Kaden   The above information is an  only   It is not intended as medical advice for individual conditions or treatments  Talk to your doctor, nurse or pharmacist before following any medical regimen to see if it is safe and effective for you

## 2021-01-08 ENCOUNTER — TELEPHONE (OUTPATIENT)
Dept: OBGYN CLINIC | Facility: HOSPITAL | Age: 81
End: 2021-01-08

## 2021-01-08 NOTE — TELEPHONE ENCOUNTER
Patient called back  I adv patient to contact Dr Susana Veronica at [phone] per Dr Jovon Angeles note

## 2021-01-08 NOTE — TELEPHONE ENCOUNTER
Patient needs a Post hospital FU with Dr Helga Jasmine for L hand cellulitis  When would you like to see patient  Please have Priti Ching [phone] staff help with set up of appt

## 2021-01-10 LAB
BACTERIA BLD CULT: NORMAL
BACTERIA BLD CULT: NORMAL

## 2021-01-11 ENCOUNTER — OFFICE VISIT (OUTPATIENT)
Dept: CARDIOLOGY CLINIC | Facility: CLINIC | Age: 81
End: 2021-01-11
Payer: MEDICARE

## 2021-01-11 VITALS
WEIGHT: 162 LBS | HEART RATE: 70 BPM | SYSTOLIC BLOOD PRESSURE: 112 MMHG | TEMPERATURE: 98 F | BODY MASS INDEX: 23.99 KG/M2 | HEIGHT: 69 IN | OXYGEN SATURATION: 98 % | DIASTOLIC BLOOD PRESSURE: 70 MMHG

## 2021-01-11 DIAGNOSIS — I49.3 PVC (PREMATURE VENTRICULAR CONTRACTION): ICD-10-CM

## 2021-01-11 DIAGNOSIS — I10 ESSENTIAL HYPERTENSION: ICD-10-CM

## 2021-01-11 DIAGNOSIS — I50.22 CHRONIC SYSTOLIC CONGESTIVE HEART FAILURE (HCC): ICD-10-CM

## 2021-01-11 DIAGNOSIS — I48.91 ATRIAL FIBRILLATION, UNSPECIFIED TYPE (HCC): Primary | ICD-10-CM

## 2021-01-11 PROCEDURE — 99215 OFFICE O/P EST HI 40 MIN: CPT | Performed by: INTERNAL MEDICINE

## 2021-01-11 PROCEDURE — 93000 ELECTROCARDIOGRAM COMPLETE: CPT | Performed by: INTERNAL MEDICINE

## 2021-01-11 RX ORDER — TORSEMIDE 20 MG/1
20 TABLET ORAL DAILY
Qty: 30 TABLET | Refills: 3 | Status: SHIPPED | OUTPATIENT
Start: 2021-01-11 | End: 2021-08-23

## 2021-01-11 NOTE — PROGRESS NOTES
Cardiology Followup    Etha Lundborg  [id number]  1940      Interval HistoryI: Etha Lundborg is a 78y o  year old male who is here for followup of CHF and atrial fibrillation  Since his last visit, he was admitted to [place] [address] with an infection of his hand  Will be following regularly with wound center  He complains of bilateral lower extremity edema without any shortness of breath, orthopnea or paroxysmal nocturnal dyspnea  Weight has been stable at home but is overall increased from 6 months ago  He is taking Eliquis 2 5 mg b i d  Along with furosemide 40 milligrams daily  In the past, he was noted to have an irregular heart beat during a visit with Dr Tammy Schwarz  Holter monitor showed the presence of atrial fibrillation and he was started on Eliquis  He had no symptoms at the time  Previously, he was having dyspnea with minimal exertion and was hospitalized in January 2019  While hospitalized, he was noted to have renal failure, CHF with an EF of 40% and frequent PVCs  Stress test was abnormal with a fixed inferolateral wall defect without ischemia  There was a small to moderate sized pericardial effusion  Past Medical History:   Diagnosis Date    Atrial fibrillation ([facility] )     Chronic kidney disease     Coronary artery disease     Diabetes mellitus ([facility] )     Hyperlipidemia     Hypertension      Social History     Socioeconomic History    Marital status:       Spouse name: Not on file    Number of children: Not on file    Years of education: Not on file    Highest education level: Not on file   Occupational History    Not on file   Social Needs    Financial resource strain: Not on file    Food insecurity     Worry: Not on file     Inability: Not on file    Transportation needs     Medical: Not on file     Non-medical: Not on file   Tobacco Use    Smoking status: Former Smoker    Smokeless tobacco: Former User   Substance and Sexual Activity    Alcohol use: Not Currently    Drug use: Not Currently    Sexual activity: Not on file   Lifestyle    Physical activity     Days per week: Not on file     Minutes per session: Not on file    Stress: Not on file   Relationships    Social connections     Talks on phone: Not on file     Gets together: Not on file     Attends Latter-day service: Not on file     Active member of club or organization: Not on file     Attends meetings of clubs or organizations: Not on file     Relationship status: Not on file    Intimate partner violence     Fear of current or ex partner: Not on file     Emotionally abused: Not on file     Physically abused: Not on file     Forced sexual activity: Not on file   Other Topics Concern    Not on file   Social History Narrative    Not on file      Family History   Problem Relation Age of Onset    Heart disease Mother     No Known Problems Father      Past Surgical History:   Procedure Laterality Date    SKIN CANCER EXCISION      TONSILLECTOMY         Current Outpatient Medications:     acetaminophen (TYLENOL) 325 mg tablet, Take 2 tablets (650 mg total) by mouth every 6 (six) hours as needed for mild pain, Disp: , Rfl: 0    allopurinol (ZYLOPRIM) 100 mg tablet, Take 100 mg by mouth 2 (two) times a day, Disp: , Rfl:     ALPRAZolam (XANAX) 0 5 mg tablet, 0 5 mg daily at bedtime , Disp: , Rfl: 0    amLODIPine (NORVASC) 2 5 mg tablet, Take 2 5 mg by mouth daily, Disp: , Rfl: 0    ascorbic acid (VITAMIN C) 500 MG tablet, Take 1 tablet (500 mg total) by mouth daily, Disp: , Rfl: 0    atorvastatin (LIPITOR) 40 mg tablet, Take 1 tablet (40 mg total) by mouth daily with dinner, Disp: 30 tablet, Rfl: 0    bacitracin topical ointment 500 units/g topical ointment, Apply 1 large application topically 2 (two) times a day, Disp: 15 g, Rfl: 0    Blood Pressure Monitor NILTON, by Does not apply route daily, Disp: 1 Device, Rfl: 0    carvedilol (COREG) 6 25 mg tablet, Take 1 tablet (6 25 mg total) by mouth 2 (two) times a day with meals, Disp: 60 tablet, Rfl: 0    cephalexin (KEFLEX) 250 mg capsule, Take 1 capsule (250 mg total) by mouth every 8 (eight) hours for 5 days, Disp: 15 capsule, Rfl: 0    cholecalciferol (VITAMIN D3) 1,000 units tablet, Take 1 tablet (1,000 Units total) by mouth daily, Disp: 60 tablet, Rfl: 3    doxycycline hyclate (VIBRAMYCIN) 100 mg capsule, Take 1 capsule (100 mg total) by mouth every 12 (twelve) hours for 5 days, Disp: 10 capsule, Rfl: 0    ELIQUIS 2 5 MG, TAKE ONE TABLET BY MOUTH TWICE A DAY, Disp: 60 tablet, Rfl: 11    furosemide (LASIX) 40 mg tablet, Take 40 mg by mouth daily , Disp: , Rfl:     glimepiride (AMARYL) 4 mg tablet, Take 4 mg by mouth daily with breakfast , Disp: , Rfl:     halobetasol (ULTRAVATE) 0 05 % cream, Apply 1 application topically daily as needed (Ezcema)  , Disp: , Rfl: 0    isosorbide mononitrate (IMDUR) 30 mg 24 hr tablet, Take 1 tablet (30 mg total) by mouth daily, Disp: 30 tablet, Rfl: 0    latanoprost (XALATAN) 0 005 % ophthalmic solution, 1 drop daily at bedtime, Disp: , Rfl:     sitaGLIPtin (JANUVIA) 25 mg tablet, Take 1 tablet (25 mg total) by mouth daily (Patient taking differently: Take 50 mg by mouth daily ), Disp: 30 tablet, Rfl: 0    timolol (TIMOPTIC) 0 5 % ophthalmic solution, Administer 1 drop to both eyes daily, Disp: , Rfl:   Allergies   Allergen Reactions    Sulfa Antibiotics     Sulfur          Review of Systems:  Review of Systems   Respiratory: Negative for shortness of breath  Cardiovascular: Positive for leg swelling  Gastrointestinal: Positive for abdominal distention  Musculoskeletal: Positive for arthralgias and myalgias  Skin: Positive for wound  All other systems reviewed and are negative        Physical Exam:  Vitals:    01/11/21 1502   BP: 112/70   BP Location: Right arm   Patient Position: Sitting   Cuff Size: Standard   Pulse: 70   Temp: 98 °F (36 7 °C)   TempSrc: Temporal   SpO2: 98%   Weight: 73 5 kg (162 lb)   Height: 5' 9" (1 753 m)     Physical Exam   Constitutional: He is oriented to person, place, and time  He appears well-developed  No distress  HENT:   Head: Normocephalic and atraumatic  Eyes: Pupils are equal, round, and reactive to light  Conjunctivae and EOM are normal    Neck: Neck supple  No JVD present  No thyromegaly present  Cardiovascular: Normal rate  An irregularly irregular rhythm present  Exam reveals no gallop and no friction rub  Murmur heard  Pulmonary/Chest: Effort normal and breath sounds normal    Musculoskeletal:         General: Edema present  No tenderness or deformity  Neurological: He is alert and oriented to person, place, and time  No cranial nerve deficit  Skin: Skin is warm and dry  No rash noted  He is not diaphoretic  No erythema  Left hand wound  Psychiatric: He has a normal mood and affect  His behavior is normal  Judgment and thought content normal        Labs: reviewed    Imaging: No results found  ECG: Atrial fibrillation at 70 bpm    Discussion/Summary:  1  Atrial fibrillation, unspecified type ([address] )    2  Essential hypertension    3  PVC (premature ventricular contraction)    4  Chronic systolic congestive heart failure (HCC)      - Continue Eliquis 2 5 mg twice a day  - Will switch furosemide to torsemide 40 mg daily   - Monitor daily weights  - Following up with nephrology for renal failure  - continue carvedilol 6 25 mg b i d   - Discussed diet and exercise

## 2021-01-14 ENCOUNTER — OFFICE VISIT (OUTPATIENT)
Dept: WOUND CARE | Facility: HOSPITAL | Age: 81
End: 2021-01-14
Payer: MEDICARE

## 2021-01-14 VITALS — RESPIRATION RATE: 20 BRPM | TEMPERATURE: 97.6 F | WEIGHT: 162 LBS | HEIGHT: 69 IN | BODY MASS INDEX: 23.99 KG/M2

## 2021-01-14 DIAGNOSIS — R60.0 LEG EDEMA: ICD-10-CM

## 2021-01-14 DIAGNOSIS — S61.402A OPEN WOUND OF LEFT HAND WITHOUT FOREIGN BODY, UNSPECIFIED WOUND TYPE, INITIAL ENCOUNTER: Primary | ICD-10-CM

## 2021-01-14 DIAGNOSIS — N18.4 TYPE 2 DIABETES MELLITUS WITH STAGE 4 CHRONIC KIDNEY DISEASE AND HYPERTENSION (HCC): ICD-10-CM

## 2021-01-14 DIAGNOSIS — S81.802A OPEN WOUND OF LEFT LOWER LEG, INITIAL ENCOUNTER: ICD-10-CM

## 2021-01-14 DIAGNOSIS — I50.22 CHRONIC SYSTOLIC CONGESTIVE HEART FAILURE (HCC): ICD-10-CM

## 2021-01-14 DIAGNOSIS — I12.9 TYPE 2 DIABETES MELLITUS WITH STAGE 4 CHRONIC KIDNEY DISEASE AND HYPERTENSION (HCC): ICD-10-CM

## 2021-01-14 DIAGNOSIS — E11.22 TYPE 2 DIABETES MELLITUS WITH STAGE 4 CHRONIC KIDNEY DISEASE AND HYPERTENSION (HCC): ICD-10-CM

## 2021-01-14 PROCEDURE — 99213 OFFICE O/P EST LOW 20 MIN: CPT | Performed by: PREVENTIVE MEDICINE

## 2021-01-14 PROCEDURE — 97598 DBRDMT OPN WND ADDL 20CM/<: CPT | Performed by: PREVENTIVE MEDICINE

## 2021-01-14 PROCEDURE — 97597 DBRDMT OPN WND 1ST 20 CM/<: CPT | Performed by: PREVENTIVE MEDICINE

## 2021-01-14 PROCEDURE — 99203 OFFICE O/P NEW LOW 30 MIN: CPT | Performed by: PREVENTIVE MEDICINE

## 2021-01-14 RX ORDER — LIDOCAINE HYDROCHLORIDE 40 MG/ML
5 SOLUTION TOPICAL ONCE
Status: COMPLETED | OUTPATIENT
Start: 2021-01-14 | End: 2021-01-14

## 2021-01-14 RX ADMIN — LIDOCAINE HYDROCHLORIDE 5 ML: 40 SOLUTION TOPICAL at 10:15

## 2021-01-14 NOTE — PROGRESS NOTES
Patient ID: Delilah Sampson is a 78 y o  male Date of Birth 1940       Chief Complaint   Patient presents with   [facility] Patient Visit     left hand wound       Allergies  Sulfa antibiotics and Sulfur    Diagnosis:  1  Open wound of left hand without foreign body, unspecified wound type, initial encounter  -     lidocaine (XYLOCAINE) 4 % topical solution 5 mL  -     Wound cleansing and dressings; Future  -     Debridement    2  Open wound of left lower leg, initial encounter  -     lidocaine (XYLOCAINE) 4 % topical solution 5 mL  -     Wound cleansing and dressings; Future  -     Debridement    3  Chronic systolic congestive heart failure ([facility] )    4  Leg edema    5  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 ([facility] )        Diagnosis ICD-10-CM Associated Orders   1  Open wound of left hand without foreign body, unspecified wound type, initial encounter  S61 402A lidocaine (XYLOCAINE) 4 % topical solution 5 mL     Wound cleansing and dressings     Debridement   2  Open wound of left lower leg, initial encounter  S81 802A lidocaine (XYLOCAINE) 4 % topical solution 5 mL     Wound cleansing and dressings     Debridement   3  Chronic systolic congestive heart failure (HCC)  I50 22    4  Leg edema  R60 0    5  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 (HCC)  E11 22     I12 9     N18 4           Assessment & Plan:  Traumatic wounds  Both appear to be healing with no signs of infection  Has several comorbidities which may delay healing and are contributing to edema of leg  See wound orders  Subjective:   Present for initial eval of wounds to L hand and L lower leg  Both were acquired after fall 2 weeks ago  Was admitted for cellulitis of hand, which has improved  Using xeroform currently  Here with son who is helping to manage  No pain  No fever  No significant drainage   Has baseline edema of both legs      The following portions of the patient's history were reviewed and updated as appropriate:   Patient Active Problem List   Diagnosis    Closed traumatic displaced fracture of distal end of left radius with malunion    PVC (premature ventricular contraction)    Essential hypertension    Leg edema    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 (HCC)    Pericardial effusion    Chronic systolic congestive heart failure (HCC)    CKD (chronic kidney disease) stage 4, GFR 15-29 ml/min ([facility])    Vitamin D deficiency    Cellulitis of left upper extremity    Atrial fibrillation (HCC)     Past Medical History:   Diagnosis Date    Atrial fibrillation (HCC)     Chronic kidney disease     Coronary artery disease     Diabetes mellitus ([facility] )     Hyperlipidemia     Hypertension      Past Surgical History:   Procedure Laterality Date    EYE SURGERY      SKIN CANCER EXCISION      TONSILLECTOMY       Social History     Socioeconomic History    Marital status:       Spouse name: None    Number of children: 1    Years of education: 15    Highest education level: 12th grade   Occupational History    None   Social Needs    Financial resource strain: None    Food insecurity     Worry: None     Inability: None    Transportation needs     Medical: None     Non-medical: None   Tobacco Use    Smoking status: Former Smoker    Smokeless tobacco: Former User   Substance and Sexual Activity    Alcohol use: Not Currently     Binge frequency: Less than monthly     Comment: spaecial occasions    Drug use: Not Currently    Sexual activity: None   Lifestyle    Physical activity     Days per week: None     Minutes per session: None    Stress: None   Relationships    Social connections     Talks on phone: None     Gets together: None     Attends Scientologist service: None     Active member of club or organization: None     Attends meetings of clubs or organizations: None     Relationship status: None    Intimate partner violence     Fear of current or ex partner: None     Emotionally abused: None     Physically abused: None     Forced sexual activity: None   Other Topics Concern    None   Social History Narrative    None        Current Outpatient Medications:     acetaminophen (TYLENOL) 325 mg tablet, Take 2 tablets (650 mg total) by mouth every 6 (six) hours as needed for mild pain, Disp: , Rfl: 0    allopurinol (ZYLOPRIM) 100 mg tablet, Take 100 mg by mouth 2 (two) times a day, Disp: , Rfl:     ALPRAZolam (XANAX) 0 5 mg tablet, 0 5 mg daily at bedtime , Disp: , Rfl: 0    atorvastatin (LIPITOR) 40 mg tablet, Take 1 tablet (40 mg total) by mouth daily with dinner, Disp: 30 tablet, Rfl: 0    bacitracin topical ointment 500 units/g topical ointment, Apply 1 large application topically 2 (two) times a day, Disp: 15 g, Rfl: 0    Blood Pressure Monitor NILTON, by Does not apply route daily, Disp: 1 Device, Rfl: 0    carvedilol (COREG) 6 25 mg tablet, Take 1 tablet (6 25 mg total) by mouth 2 (two) times a day with meals, Disp: 60 tablet, Rfl: 0    cholecalciferol (VITAMIN D3) 1,000 units tablet, Take 1 tablet (1,000 Units total) by mouth daily, Disp: 60 tablet, Rfl: 3    ELIQUIS 2 5 MG, TAKE ONE TABLET BY MOUTH TWICE A DAY, Disp: 60 tablet, Rfl: 11    glimepiride (AMARYL) 4 mg tablet, Take 4 mg by mouth daily with breakfast , Disp: , Rfl:     halobetasol (ULTRAVATE) 0 05 % cream, Apply 1 application topically daily as needed (Ezcema)  , Disp: , Rfl: 0    isosorbide mononitrate (IMDUR) 30 mg 24 hr tablet, Take 1 tablet (30 mg total) by mouth daily, Disp: 30 tablet, Rfl: 0    latanoprost (XALATAN) 0 005 % ophthalmic solution, 1 drop daily at bedtime, Disp: , Rfl:     sitaGLIPtin (JANUVIA) 25 mg tablet, Take 1 tablet (25 mg total) by mouth daily (Patient taking differently: Take 50 mg by mouth daily ), Disp: 30 tablet, Rfl: 0    timolol (TIMOPTIC) 0 5 % ophthalmic solution, Administer 1 drop to both eyes daily, Disp: , Rfl:   torsemide (DEMADEX) 20 mg tablet, Take 1 tablet (20 mg total) by mouth daily, Disp: 30 tablet, Rfl: 3    ascorbic acid (VITAMIN C) 500 MG tablet, Take 1 tablet (500 mg total) by mouth daily, Disp: , Rfl: 0    Current Facility-Administered Medications:     lidocaine (XYLOCAINE) 4 % topical solution 5 mL, 5 mL, Topical, Once, Lianet Hong DO  Family History   Problem Relation Age of Onset    Heart disease Mother     Diabetes Father     Vision loss Father     Cancer Sister     Diabetes Sister       Review of Systems   Constitutional: Negative  Respiratory: Negative  Cardiovascular: Positive for leg swelling  Skin: Positive for wound  Negative for rash  Neurological: Negative  Psychiatric/Behavioral: Negative  Objective:  Temp 97 6 °F (36 4 °C)   Resp 20   Ht 5' 9" (1 753 m)   Wt 73 5 kg (162 lb)   BMI 23 92 kg/m²     Physical Exam  Vitals signs reviewed  Constitutional:       General: He is not in acute distress  Cardiovascular:      Rate and Rhythm: Normal rate  Pulmonary:      Effort: Pulmonary effort is normal    Musculoskeletal:      Right lower leg: Edema present  Left lower leg: Edema present  Skin:     Comments: See wound assessment   Neurological:      General: No focal deficit present  Mental Status: He is alert and oriented to person, place, and time  Psychiatric:         Mood and Affect: Mood normal          Behavior: Behavior normal              Wound 01/05/21 Traumatic Leg Left (Active)   Wound Image   01/14/21 0957   Wound Description Clean;Granulation tissue; Epithelialization;Fragile 01/14/21 0957   Ramandeep-wound Assessment Clean;Dry; Intact; Yellow-brown 01/14/21 0957   Wound Length (cm) 1 cm 01/14/21 0957   Wound Width (cm) 2 6 cm 01/14/21 0957   Wound Depth (cm) 0 1 cm 01/14/21 0957   Wound Surface Area (cm^2) 2 6 cm^2 01/14/21 0957   Wound Volume (cm^3) 0 26 cm^3 01/14/21 0957   Calculated Wound Volume (cm^3) 0 26 cm^3 01/14/21 0957   Drainage Amount Small 01/14/21 0957   Drainage Description Serosanguineous 01/14/21 0957   Non-staged Wound Description Full thickness 01/14/21 0957   Patient Tolerance Tolerated well 01/14/21 0957   Dressing Status Clean; Old drainage 01/14/21 0957       Wound 01/05/21 Traumatic Hand Left (Active)   Wound Image   01/14/21 0955   Wound Description Clean;Granulation tissue;Fragile;Epithelialization 01/14/21 0955   Ramandeep-wound Assessment Clean;Dry; Intact 01/14/21 0955   Wound Length (cm) 4 5 cm 01/14/21 0955   Wound Width (cm) 7 cm 01/14/21 0955   Wound Depth (cm) 0 1 cm 01/14/21 0955   Wound Surface Area (cm^2) 31 5 cm^2 01/14/21 0955   Wound Volume (cm^3) 3 15 cm^3 01/14/21 0955   Calculated Wound Volume (cm^3) 3 15 cm^3 01/14/21 0955   Drainage Amount Small 01/14/21 0955   Drainage Description Serosanguineous 01/14/21 0955   Non-staged Wound Description Full thickness 01/14/21 0955   Dressing Xeroform;Gauze 01/14/21 0955   Dressing Status Clean; Intact; New drainage 01/14/21 0955                             Debridement   Wound 01/05/21 Traumatic Hand Left    Universal Protocol:  Consent: Written consent obtained  Risks and benefits: risks, benefits and alternatives were discussed  Consent given by: patient  Time out: Immediately prior to procedure a "time out" was called to verify the correct patient, procedure, equipment, support staff and site/side marked as required    Patient identity confirmed: verbally with patient      Performed by: physician  Debridement type: selective  Pain control: lidocaine 4%  Post-debridement measurements  Length (cm): 4 5  Width (cm): 7  Depth (cm): 0 1  Percent debrided: 100%  Surface Area (cm^2): 31 5  Area debrided (cm^2): 31 5  Volume (cm^3): 3 15  Devitalized tissue debrided: biofilm, necrotic debris and slough  Instrument(s) utilized: curette  Bleeding: medium  Hemostasis obtained with: silver nitrate  Procedural pain (0-10): 0  Post-procedural pain: 0   Response to treatment: procedure was tolerated well    Debridement   Wound 01/05/21 Traumatic Leg Left    Universal Protocol:  Consent: Written consent obtained  Risks and benefits: risks, benefits and alternatives were discussed  Consent given by: patient  Time out: Immediately prior to procedure a "time out" was called to verify the correct patient, procedure, equipment, support staff and site/side marked as required  Patient identity confirmed: verbally with patient      Performed by: physician  Debridement type: selective    Post-debridement measurements  Length (cm): 1  Width (cm): 2 6  Depth (cm): 0 1  Percent debrided: 100%  Surface Area (cm^2): 2 6  Area debrided (cm^2): 2 6  Volume (cm^3): 0 26  Devitalized tissue debrided: biofilm and slough  Instrument(s) utilized: curette  Bleeding: small  Hemostasis obtained with: pressure  Procedural pain (0-10): 0  Post-procedural pain: 0   Response to treatment: procedure was tolerated well                 Wound Instructions:  Orders Placed This Encounter   Procedures    Wound cleansing and dressings     Left hand and left leg wound  Wash your hands with soap and water  Remove old dressing, discard into plastic bag and place in trash  Cleanse the wound with soap and water prior to applying a clean dressing  Do not use tissue or cotton balls  Do not scrub the wound  Pat dry using gauze  Shower yes   Apply lotion to skin surrounding wound  Apply dermagran to the  wound  Cover with gauze  Secure with rolled gauze  Change dressing every other day  This was done today     Standing Status:   Future     Standing Expiration Date:   1/14/2022    Debridement     This order was created via procedure documentation    Debridement     This order was created via procedure documentation         Deepti Valdivia DO    Portions of the record may have been created with voice recognition software   Occasional wrong word or "sound a like" substitutions may have occurred due to the inherent limitations of voice recognition software  Read the chart carefully and recognize, using context, where substitutions have occurred

## 2021-01-14 NOTE — PATIENT INSTRUCTIONS
Orders Placed This Encounter   Procedures    Wound cleansing and dressings     Left hand and left leg wound  Wash your hands with soap and water  Remove old dressing, discard into plastic bag and place in trash  Cleanse the wound with soap and water prior to applying a clean dressing  Do not use tissue or cotton balls  Do not scrub the wound  Pat dry using gauze  Shower yes   Apply lotion to skin surrounding wound  Apply dermagran to the  wound    Cover with gauze  Secure with rolled gauze  Change dressing every other day  This was done today     Standing Status:   Future     Standing Expiration Date:   1/14/2022

## 2021-01-21 ENCOUNTER — OFFICE VISIT (OUTPATIENT)
Dept: WOUND CARE | Facility: HOSPITAL | Age: 81
End: 2021-01-21
Payer: MEDICARE

## 2021-01-21 VITALS
SYSTOLIC BLOOD PRESSURE: 129 MMHG | TEMPERATURE: 97.3 F | RESPIRATION RATE: 18 BRPM | DIASTOLIC BLOOD PRESSURE: 68 MMHG | HEART RATE: 58 BPM

## 2021-01-21 DIAGNOSIS — R60.0 LEG EDEMA: ICD-10-CM

## 2021-01-21 DIAGNOSIS — N18.4 TYPE 2 DIABETES MELLITUS WITH STAGE 4 CHRONIC KIDNEY DISEASE AND HYPERTENSION (HCC): ICD-10-CM

## 2021-01-21 DIAGNOSIS — I12.9 TYPE 2 DIABETES MELLITUS WITH STAGE 4 CHRONIC KIDNEY DISEASE AND HYPERTENSION (HCC): ICD-10-CM

## 2021-01-21 DIAGNOSIS — S81.802A OPEN WOUND OF LEFT LOWER LEG, INITIAL ENCOUNTER: ICD-10-CM

## 2021-01-21 DIAGNOSIS — S61.402A OPEN WOUND OF LEFT HAND WITHOUT FOREIGN BODY, UNSPECIFIED WOUND TYPE, INITIAL ENCOUNTER: Primary | ICD-10-CM

## 2021-01-21 DIAGNOSIS — E11.22 TYPE 2 DIABETES MELLITUS WITH STAGE 4 CHRONIC KIDNEY DISEASE AND HYPERTENSION (HCC): ICD-10-CM

## 2021-01-21 DIAGNOSIS — I50.22 CHRONIC SYSTOLIC CONGESTIVE HEART FAILURE (HCC): ICD-10-CM

## 2021-01-21 PROCEDURE — 97597 DBRDMT OPN WND 1ST 20 CM/<: CPT | Performed by: PREVENTIVE MEDICINE

## 2021-01-21 PROCEDURE — 99213 OFFICE O/P EST LOW 20 MIN: CPT | Performed by: PREVENTIVE MEDICINE

## 2021-01-21 RX ORDER — LIDOCAINE HYDROCHLORIDE 40 MG/ML
5 SOLUTION TOPICAL ONCE
Status: COMPLETED | OUTPATIENT
Start: 2021-01-21 | End: 2021-01-21

## 2021-01-21 RX ADMIN — LIDOCAINE HYDROCHLORIDE 5 ML: 40 SOLUTION TOPICAL at 14:43

## 2021-01-21 NOTE — PATIENT INSTRUCTIONS
Orders Placed This Encounter   Procedures    Wound cleansing and dressings     Left hand and left leg wound  Wash your hands with soap and water  Remove old dressing, discard into plastic bag and place in trash  Cleanse the wound with soap and water prior to applying a clean dressing  Do not use tissue or cotton balls  Do not scrub the wound  Pat dry using gauze  Shower yes   Apply lotion to skin surrounding wound  Apply dermagran to the wound  Cover with gauze  Secure with rolled gauze  Change dressing every other day  This was done today     Standing Status:   Future     Standing Expiration Date:   1/21/2022    Wound compression and edema control     Elastic Tubular Stocking - spandagrip F    Tubular elastic bandage: Apply from base of toes to behind the knee  Apply in AM, may remove for sleep  Avoid prolonged standing in one place  Elevate leg(s) above the level of the heart when sitting or as much as possible       Standing Status:   Future     Standing Expiration Date:   1/21/2022

## 2021-01-21 NOTE — PROGRESS NOTES
Patient ID: Keanu Trevino is a 78 y o  male Date of Birth 1940       Chief Complaint   Patient presents with    Follow Up Wound Care Visit     Left Hand and Left leg wounds       Allergies:  Sulfa antibiotics and Sulfur    Diagnosis:  1  Open wound of left hand without foreign body, unspecified wound type, initial encounter  -     lidocaine (XYLOCAINE) 4 % topical solution 5 mL  -     Wound cleansing and dressings; Future  -     Wound compression and edema control; Future    2  Open wound of left lower leg, initial encounter  -     lidocaine (XYLOCAINE) 4 % topical solution 5 mL  -     Wound cleansing and dressings; Future  -     Wound compression and edema control; Future  -     Debridement    3  Chronic systolic congestive heart failure ([facility] [address] )    4  Leg edema    5  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 ([facility] [address] )       Diagnosis ICD-10-CM Associated Orders   1  Open wound of left hand without foreign body, unspecified wound type, initial encounter  S61 402A lidocaine (XYLOCAINE) 4 % topical solution 5 mL     Wound cleansing and dressings     Wound compression and edema control   2  Open wound of left lower leg, initial encounter  S81 802A lidocaine (XYLOCAINE) 4 % topical solution 5 mL     Wound cleansing and dressings     Wound compression and edema control     Debridement   3  Chronic systolic congestive heart failure (HCC)  I50 22    4  Leg edema  R60 0    5  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 (HCC)  E11 22     I12 9     N18 4         Assessment & Plan:  See wound orders  Healing  Continue current care    Subjective:   1/14 - Present for initial eval of wounds to L hand and L lower leg  Both were acquired after fall 2 weeks ago  Was admitted for cellulitis of hand, which has improved  Using xeroform currently  Here with son who is helping to manage  No pain  No fever  No significant drainage   Has baseline edema of both legs    1/21 - f/u traumatic wounds  No new complaints  No pain  No fever      The following portions of the patient's history were reviewed and updated as appropriate:   Patient Active Problem List   Diagnosis    Closed traumatic displaced fracture of distal end of left radius with malunion    PVC (premature ventricular contraction)    Essential hypertension    Leg edema    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 [facility]    Pericardial effusion    Chronic systolic congestive heart failure (HCC)    CKD (chronic kidney disease) stage 4, GFR 15-29 ml/min ([facility])    Vitamin D deficiency    Cellulitis of left upper extremity    Atrial fibrillation ([facility])     Past Medical History:   Diagnosis Date    Atrial fibrillation ([facility] )     Chronic kidney disease     Coronary artery disease     Diabetes mellitus ([facility] )     Hyperlipidemia     Hypertension      Past Surgical History:   Procedure Laterality Date    EYE SURGERY      SKIN CANCER EXCISION      TONSILLECTOMY       Social History     Socioeconomic History    Marital status:       Spouse name: None    Number of children: 1    Years of education: 15    Highest education level: 12th grade   Occupational History    None   Social Needs    Financial resource strain: None    Food insecurity     Worry: None     Inability: None    Transportation needs     Medical: None     Non-medical: None   Tobacco Use    Smoking status: Former Smoker    Smokeless tobacco: Former User   Substance and Sexual Activity    Alcohol use: Not Currently     Binge frequency: Less than monthly     Comment: spaecial occasions    Drug use: Not Currently    Sexual activity: None   Lifestyle    Physical activity     Days per week: None     Minutes per session: None    Stress: None   Relationships    Social connections     Talks on phone: None     Gets together: None     Attends Jainism service: None     Active member of club or organization: None     Attends meetings of clubs or organizations: None     Relationship status: None    Intimate partner violence     Fear of current or ex partner: None     Emotionally abused: None     Physically abused: None     Forced sexual activity: None   Other Topics Concern    None   Social History Narrative    None        Current Outpatient Medications:     acetaminophen (TYLENOL) 325 mg tablet, Take 2 tablets (650 mg total) by mouth every 6 (six) hours as needed for mild pain, Disp: , Rfl: 0    allopurinol (ZYLOPRIM) 100 mg tablet, Take 100 mg by mouth 2 (two) times a day, Disp: , Rfl:     ALPRAZolam (XANAX) 0 5 mg tablet, 0 5 mg daily at bedtime , Disp: , Rfl: 0    ascorbic acid (VITAMIN C) 500 MG tablet, Take 1 tablet (500 mg total) by mouth daily, Disp: , Rfl: 0    atorvastatin (LIPITOR) 40 mg tablet, Take 1 tablet (40 mg total) by mouth daily with dinner, Disp: 30 tablet, Rfl: 0    bacitracin topical ointment 500 units/g topical ointment, Apply 1 large application topically 2 (two) times a day, Disp: 15 g, Rfl: 0    Blood Pressure Monitor NILTON, by Does not apply route daily, Disp: 1 Device, Rfl: 0    carvedilol (COREG) 6 25 mg tablet, Take 1 tablet (6 25 mg total) by mouth 2 (two) times a day with meals, Disp: 60 tablet, Rfl: 0    cholecalciferol (VITAMIN D3) 1,000 units tablet, Take 1 tablet (1,000 Units total) by mouth daily, Disp: 60 tablet, Rfl: 3    ELIQUIS 2 5 MG, TAKE ONE TABLET BY MOUTH TWICE A DAY, Disp: 60 tablet, Rfl: 11    glimepiride (AMARYL) 4 mg tablet, Take 4 mg by mouth daily with breakfast , Disp: , Rfl:     halobetasol (ULTRAVATE) 0 05 % cream, Apply 1 application topically daily as needed (Ezcema)  , Disp: , Rfl: 0    isosorbide mononitrate (IMDUR) 30 mg 24 hr tablet, Take 1 tablet (30 mg total) by mouth daily, Disp: 30 tablet, Rfl: 0    latanoprost (XALATAN) 0 005 % ophthalmic solution, 1 drop daily at bedtime, Disp: , Rfl:     sitaGLIPtin (JANUVIA) 25 mg tablet, Take 1 tablet (25 mg total) by mouth daily (Patient taking differently: Take 50 mg by mouth daily ), Disp: 30 tablet, Rfl: 0    timolol (TIMOPTIC) 0 5 % ophthalmic solution, Administer 1 drop to both eyes daily, Disp: , Rfl:     torsemide (DEMADEX) 20 mg tablet, Take 1 tablet (20 mg total) by mouth daily, Disp: 30 tablet, Rfl: 3  No current facility-administered medications for this visit  Family History   Problem Relation Age of Onset    Heart disease Mother     Diabetes Father     Vision loss Father     Cancer Sister     Diabetes Sister       Review of Systems   Constitutional: Negative  Respiratory: Negative  Cardiovascular: Positive for leg swelling  Skin: Positive for wound  Objective:  /68   Pulse 58   Temp (!) 97 3 °F (36 3 °C)   Resp 18     Physical Exam  Vitals signs reviewed  Constitutional:       General: He is not in acute distress  Cardiovascular:      Rate and Rhythm: Normal rate  Pulmonary:      Effort: Pulmonary effort is normal    Musculoskeletal:      Left lower leg: Edema present  Skin:     Comments: See wound assessment   Neurological:      General: No focal deficit present  Mental Status: He is alert and oriented to person, place, and time  Psychiatric:         Mood and Affect: Mood normal          Behavior: Behavior normal              Wound 01/05/21 Traumatic Leg Left (Active)   Wound Image   01/21/21 1439   Wound Description Clean;Granulation tissue; Epithelialization;Fragile 01/21/21 1439   Ramandeep-wound Assessment Clean;Dry; Intact; Yellow-brown 01/21/21 1439   Wound Length (cm) 0 8 cm 01/21/21 1439   Wound Width (cm) 2 cm 01/21/21 1439   Wound Depth (cm) 0 1 cm 01/21/21 1439   Wound Surface Area (cm^2) 1 6 cm^2 01/21/21 1439   Wound Volume (cm^3) 0 16 cm^3 01/21/21 1439   Calculated Wound Volume (cm^3) 0 16 cm^3 01/21/21 1439   Change in Wound Size % 38 46 01/21/21 1439   Drainage Amount Small 01/21/21 1439   Drainage Description Bloody 01/21/21 621 [facility]   Non-staged Wound Description Full thickness 01/14/21 0957   Treatments Cleansed 01/21/21 1439   Patient Tolerance Tolerated well 01/14/21 0957   Dressing Status Clean;Dry; Old drainage 01/21/21 1439       Wound 01/05/21 Traumatic Hand Left (Active)   Wound Image   01/21/21 1440   Wound Description Clean;Granulation tissue;Fragile;Epithelialization 01/21/21 1440   Ramandeep-wound Assessment Clean;Dry; Intact 01/21/21 1440   Wound Length (cm) 1 cm 01/21/21 1440   Wound Width (cm) 0 7 cm 01/21/21 1440   Wound Depth (cm) 0 1 cm 01/21/21 1440   Wound Surface Area (cm^2) 0 7 cm^2 01/21/21 1440   Wound Volume (cm^3) 0 07 cm^3 01/21/21 1440   Calculated Wound Volume (cm^3) 0 07 cm^3 01/21/21 1440   Change in Wound Size % 97 78 01/21/21 1440   Drainage Amount Scant 01/21/21 1440   Drainage Description Serosanguineous 01/21/21 1440   Non-staged Wound Description Full thickness 01/21/21 1440   Treatments Cleansed 01/21/21 1440   Dressing Xeroform;Gauze 01/14/21 0955   Dressing Status Intact; Old drainage 01/21/21 1440                         Debridement   Wound 01/05/21 Traumatic Leg Left    Universal Protocol:  Consent: Written consent obtained  Risks and benefits: risks, benefits and alternatives were discussed  Consent given by: patient  Time out: Immediately prior to procedure a "time out" was called to verify the correct patient, procedure, equipment, support staff and site/side marked as required    Patient identity confirmed: verbally with patient      Performed by: physician  Debridement type: selective  Pain control: lidocaine 4%  Post-debridement measurements  Length (cm): 1  Width (cm): 0 7  Depth (cm): 0 1  Percent debrided: 100%  Surface Area (cm^2): 0 7  Area debrided (cm^2): 0 7  Volume (cm^3): 0 07  Devitalized tissue debrided: necrotic debris  Instrument(s) utilized: curette  Bleeding: small  Hemostasis obtained with: pressure  Procedural pain (0-10): 0  Post-procedural pain: 0   Response to treatment: procedure was tolerated well                   Wound Instructions:  Orders Placed This Encounter   Procedures    Wound cleansing and dressings     Left hand and left leg wound  Wash your hands with soap and water  Remove old dressing, discard into plastic bag and place in trash  Cleanse the wound with soap and water prior to applying a clean dressing  Do not use tissue or cotton balls  Do not scrub the wound  Pat dry using gauze  Shower yes   Apply lotion to skin surrounding wound  Apply dermagran to the wound  Cover with gauze  Secure with rolled gauze  Change dressing every other day  This was done today     Standing Status:   Future     Standing Expiration Date:   1/21/2022    Wound compression and edema control     Elastic Tubular Stocking - spandagrip F    Tubular elastic bandage: Apply from base of toes to behind the knee  Apply in AM, may remove for sleep  Avoid prolonged standing in one place  Elevate leg(s) above the level of the heart when sitting or as much as possible  Standing Status:   Future     Standing Expiration Date:   1/21/2022    Debridement     This order was created via procedure documentation         Bobby Shape, DO      Portions of the record may have been created with voice recognition software  Occasional wrong word or "sound a like" substitutions may have occurred due to the inherent limitations of voice recognition software  Read the chart carefully and recognize, using context, where substitutions have occurred

## 2021-02-04 LAB
CREAT ?TM UR-SCNC: 27.9 UMOL/L
EXT MICROALBUMIN URINE RANDOM: 69.1
HBA1C MFR BLD HPLC: 7.1 %
MICROALBUMIN/CREAT UR: 248 MG/G{CREAT}

## 2021-02-08 ENCOUNTER — OFFICE VISIT (OUTPATIENT)
Dept: WOUND CARE | Facility: HOSPITAL | Age: 81
End: 2021-02-08
Payer: MEDICARE

## 2021-02-08 DIAGNOSIS — I50.22 CHRONIC SYSTOLIC CONGESTIVE HEART FAILURE (HCC): ICD-10-CM

## 2021-02-08 DIAGNOSIS — E11.22 TYPE 2 DIABETES MELLITUS WITH STAGE 4 CHRONIC KIDNEY DISEASE AND HYPERTENSION (HCC): ICD-10-CM

## 2021-02-08 DIAGNOSIS — R60.0 LEG EDEMA: ICD-10-CM

## 2021-02-08 DIAGNOSIS — I12.9 TYPE 2 DIABETES MELLITUS WITH STAGE 4 CHRONIC KIDNEY DISEASE AND HYPERTENSION (HCC): ICD-10-CM

## 2021-02-08 DIAGNOSIS — N18.4 TYPE 2 DIABETES MELLITUS WITH STAGE 4 CHRONIC KIDNEY DISEASE AND HYPERTENSION (HCC): ICD-10-CM

## 2021-02-08 DIAGNOSIS — S81.802A OPEN WOUND OF LEFT LOWER LEG, INITIAL ENCOUNTER: ICD-10-CM

## 2021-02-08 DIAGNOSIS — S61.402A OPEN WOUND OF LEFT HAND WITHOUT FOREIGN BODY, UNSPECIFIED WOUND TYPE, INITIAL ENCOUNTER: Primary | ICD-10-CM

## 2021-02-08 PROCEDURE — 99212 OFFICE O/P EST SF 10 MIN: CPT | Performed by: NURSE PRACTITIONER

## 2021-02-08 PROCEDURE — 99213 OFFICE O/P EST LOW 20 MIN: CPT | Performed by: NURSE PRACTITIONER

## 2021-02-08 NOTE — PROGRESS NOTES
Patient ID: Mili Graves is a 78 y o  male Date of Birth 1940     Chief Complaint  Chief Complaint   Patient presents with    Follow Up Wound Care Visit     Left hand and left leg wound       Allergies  Sulfa antibiotics and Sulfur    Assessment:     Diagnoses and all orders for this visit:    Open wound of left hand without foreign body, unspecified wound type, initial encounter  -     Wound compression and edema control; Future  -     Wound cleansing and dressings; Future    Open wound of left lower leg, initial encounter  -     Wound compression and edema control; Future  -     Wound cleansing and dressings; Future    Chronic systolic congestive heart failure (HCC)    Leg edema    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 ([facility] [address] )          Plan:  1  F/u visit  Wounds are epithelialized  Counseled patient to moisturize his skin daily and to wear compression stockings 10-15mmHg on his bilateral lower extremities daily  Patient verbalized agreement and understanding  Patient is discharged from the wound center today  Can follow up PRN  Wound 01/05/21 Traumatic Leg Left (Active)   Wound Image Images linked 02/08/21 1407   Wound Description Epithelialization 02/08/21 1407   Ramandeep-wound Assessment Clean;Dry 02/08/21 1407   Wound Length (cm) 0 cm 02/08/21 1407   Wound Width (cm) 0 cm 02/08/21 1407   Wound Depth (cm) 0 cm 02/08/21 1407   Wound Surface Area (cm^2) 0 cm^2 02/08/21 1407   Wound Volume (cm^3) 0 cm^3 02/08/21 1407   Calculated Wound Volume (cm^3) 0 cm^3 02/08/21 1407   Change in Wound Size % 100 02/08/21 1407   Drainage Amount None 02/08/21 1407   Non-staged Wound Description Not applicable 85/87/46 4608       Wound 01/05/21 Traumatic Hand Left (Active)   Wound Image Images linked 02/08/21 1409   Wound Description Eschar 02/08/21 1409   Ramandeep-wound Assessment Clean;Dry; Intact 02/08/21 1409   Wound Length (cm) 1 7 cm 02/08/21 1409   Wound Width (cm) 2 cm 02/08/21 1409   Wound Depth (cm) 0 cm 02/08/21 1409   Wound Surface Area (cm^2) 3 4 cm^2 02/08/21 1409   Wound Volume (cm^3) 0 cm^3 02/08/21 1409   Calculated Wound Volume (cm^3) 0 cm^3 02/08/21 1409   Change in Wound Size % 100 02/08/21 1409   Drainage Amount Scant 02/08/21 1409   Drainage Description Serous 02/08/21 1409   Non-staged Wound Description Full thickness 02/08/21 1409   Dressing Status Intact; Old drainage 02/08/21 1409       Wound 01/05/21 Traumatic Leg Left (Active)   Date First Assessed/Time First Assessed: 01/05/21 2300   Pre-Existing Wound: Yes  Primary Wound Type: Traumatic  Location: Leg  Wound Location Orientation: Left  Dressing Status: Clean;Dry; Intact  Wound Outcome: Healed       Wound 01/05/21 Traumatic Hand Left (Active)   Date First Assessed/Time First Assessed: 01/05/21 2300   Pre-Existing Wound: Yes  Primary Wound Type: Traumatic  Location: Hand  Wound Location Orientation: Left  Dressing Status: Clean;Dry; Intact       [REMOVED] Wound 01/12/19 Abrasion(s) Leg Left; Lower (Removed)   Resolved Date/Resolved Time: 01/14/21 0950  Date First Assessed/Time First Assessed: 01/12/19 2130   Traumatic Wound Type: Abrasion(s)  Location: Leg  Wound Location Orientation: Left; Lower       Subjective:     F/u visit traumatic wounds on his LLE and left hand  No new complaints  Denies any pain, fevers, or chills  The following portions of the patient's history were reviewed and updated as appropriate:   He  has a past medical history of Atrial fibrillation ([address] ), Chronic kidney disease, Coronary artery disease, Diabetes mellitus ([address] ), Hyperlipidemia, and Hypertension    He   Patient Active Problem List    Diagnosis Date Noted    Cellulitis of left upper extremity 01/05/2021    Atrial fibrillation ([address] ) 01/05/2021    Vitamin D deficiency 10/18/2019    CKD (chronic kidney disease) stage 4, GFR 15-29 ml/min ([facility]) 04/05/2019    Chronic systolic congestive heart failure ([address] ) 01/29/2019    Pericardial effusion 01/13/2019    Leg edema 01/10/2019    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 ([facility] ) 01/10/2019    PVC (premature ventricular contraction) 12/19/2018    Essential hypertension 12/19/2018    Closed traumatic displaced fracture of distal end of left radius with malunion 02/09/2018     He  has a past surgical history that includes Skin cancer excision; Tonsillectomy; and Eye surgery  His family history includes Cancer in his sister; Diabetes in his father and sister; Heart disease in his mother; Vision loss in his father  He  reports that he has quit smoking  He has quit using smokeless tobacco  He reports previous alcohol use  He reports previous drug use    Current Outpatient Medications   Medication Sig Dispense Refill    acetaminophen (TYLENOL) 325 mg tablet Take 2 tablets (650 mg total) by mouth every 6 (six) hours as needed for mild pain  0    allopurinol (ZYLOPRIM) 100 mg tablet Take 100 mg by mouth 2 (two) times a day      ALPRAZolam (XANAX) 0 5 mg tablet 0 5 mg daily at bedtime   0    ascorbic acid (VITAMIN C) 500 MG tablet Take 1 tablet (500 mg total) by mouth daily  0    atorvastatin (LIPITOR) 40 mg tablet Take 1 tablet (40 mg total) by mouth daily with dinner 30 tablet 0    bacitracin topical ointment 500 units/g topical ointment Apply 1 large application topically 2 (two) times a day 15 g 0    Blood Pressure Monitor NILTON by Does not apply route daily 1 Device 0    carvedilol (COREG) 6 25 mg tablet Take 1 tablet (6 25 mg total) by mouth 2 (two) times a day with meals 60 tablet 0    cholecalciferol (VITAMIN D3) 1,000 units tablet Take 1 tablet (1,000 Units total) by mouth daily 60 tablet 3    ELIQUIS 2 5 MG TAKE ONE TABLET BY MOUTH TWICE A DAY 60 tablet 11    glimepiride (AMARYL) 4 mg tablet Take 4 mg by mouth daily with breakfast       halobetasol (ULTRAVATE) 0 05 % cream Apply 1 application topically daily as needed (Ezcema)    0    isosorbide mononitrate (IMDUR) 30 mg 24 hr tablet Take 1 tablet (30 mg total) by mouth daily 30 tablet 0    latanoprost (XALATAN) 0 005 % ophthalmic solution 1 drop daily at bedtime      sitaGLIPtin (JANUVIA) 25 mg tablet Take 1 tablet (25 mg total) by mouth daily (Patient taking differently: Take 50 mg by mouth daily ) 30 tablet 0    timolol (TIMOPTIC) 0 5 % ophthalmic solution Administer 1 drop to both eyes daily      torsemide (DEMADEX) 20 mg tablet Take 1 tablet (20 mg total) by mouth daily 30 tablet 3     No current facility-administered medications for this visit  He is allergic to sulfa antibiotics and sulfur       Review of Systems   Constitutional: Negative  HENT: Negative for ear pain and hearing loss  Eyes: Negative for pain  Respiratory: Negative for chest tightness and shortness of breath  Cardiovascular: Positive for leg swelling  Negative for chest pain and palpitations  Gastrointestinal: Negative for diarrhea, nausea and vomiting  Genitourinary: Negative for dysuria  Musculoskeletal: Positive for gait problem  Skin: Positive for wound  Neurological: Negative for tremors and weakness  Psychiatric/Behavioral: Negative for behavioral problems, confusion and suicidal ideas           Objective:       Wound 01/05/21 Traumatic Leg Left (Active)   Wound Image Images linked 02/08/21 1407   Wound Description Epithelialization 02/08/21 1407   Ramandeep-wound Assessment Clean;Dry 02/08/21 1407   Wound Length (cm) 0 cm 02/08/21 1407   Wound Width (cm) 0 cm 02/08/21 1407   Wound Depth (cm) 0 cm 02/08/21 1407   Wound Surface Area (cm^2) 0 cm^2 02/08/21 1407   Wound Volume (cm^3) 0 cm^3 02/08/21 1407   Calculated Wound Volume (cm^3) 0 cm^3 02/08/21 1407   Change in Wound Size % 100 02/08/21 1407   Drainage Amount None 02/08/21 1407   Non-staged Wound Description Not applicable 61/49/67 5657       Wound 01/05/21 Traumatic Hand Left (Active)   Wound Image Images linked 02/08/21 1409   Wound Description Eschar 02/08/21 1409   Ramandeep-wound Assessment Clean;Dry; Intact 02/08/21 1409   Wound Length (cm) 1 7 cm 02/08/21 1409   Wound Width (cm) 2 cm 02/08/21 1409   Wound Depth (cm) 0 cm 02/08/21 1409   Wound Surface Area (cm^2) 3 4 cm^2 02/08/21 1409   Wound Volume (cm^3) 0 cm^3 02/08/21 1409   Calculated Wound Volume (cm^3) 0 cm^3 02/08/21 1409   Change in Wound Size % 100 02/08/21 1409   Drainage Amount Scant 02/08/21 1409   Drainage Description Serous 02/08/21 1409   Non-staged Wound Description Full thickness 02/08/21 1409   Dressing Status Intact; Old drainage 02/08/21 1409       There were no vitals taken for this visit  Physical Exam  Vitals signs and nursing note reviewed  Constitutional:       General: He is not in acute distress  Appearance: Normal appearance  He is normal weight  HENT:      Head: Normocephalic and atraumatic  Eyes:      General:         Right eye: No discharge  Left eye: No discharge  Neck:      Musculoskeletal: Normal range of motion and neck supple  No neck rigidity  Pulmonary:      Effort: Pulmonary effort is normal  No respiratory distress  Musculoskeletal:      Right lower leg: No edema  Left lower leg: No edema  Skin:     General: Skin is warm and dry  Findings: No erythema or wound  Neurological:      General: No focal deficit present  Mental Status: He is alert and oriented to person, place, and time  Mental status is at baseline  Psychiatric:         Mood and Affect: Mood normal          Behavior: Behavior normal          Thought Content: Thought content normal          Judgment: Judgment normal            Wound Instructions:  Orders Placed This Encounter   Procedures    Wound compression and edema control     Compression Stocking    Remove compression stockings every night HS and re-apply first thing qAM  Follow daily skin care as instructed  Avoid prolonged standing in one place      Elevate leg(s) above the level of the heart when sitting or as much as possible  Lite (Mild) Compression socks - over the counter - 8-12mmHg or 10-15mmHg     Standing Status:   Future     Standing Expiration Date:   2/8/2022    Wound cleansing and dressings     Moisturize healed areas on left hand and leg daily  Standing Status:   Future     Standing Expiration Date:   2/8/2022        Diagnosis ICD-10-CM Associated Orders   1  Open wound of left hand without foreign body, unspecified wound type, initial encounter  S61 402A Wound compression and edema control     Wound cleansing and dressings   2  Open wound of left lower leg, initial encounter  S81 802A Wound compression and edema control     Wound cleansing and dressings   3  Chronic systolic congestive heart failure (HCC)  I50 22    4  Leg edema  R60 0    5   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 (HCC)  E11 22     I12 9     N18 4

## 2021-02-08 NOTE — PATIENT INSTRUCTIONS
Orders Placed This Encounter   Procedures    Wound compression and edema control     Compression Stocking    Remove compression stockings every night HS and re-apply first thing qAM  Follow daily skin care as instructed  Avoid prolonged standing in one place  Elevate leg(s) above the level of the heart when sitting or as much as possible  Lite (Mild) Compression socks - over the counter - 8-12mmHg or 10-15mmHg     Standing Status:   Future     Standing Expiration Date:   2/8/2022    Wound cleansing and dressings     Moisturize healed areas on left hand and leg daily       Standing Status:   Future     Standing Expiration Date:   2/8/2022

## 2021-02-10 ENCOUNTER — TELEPHONE (OUTPATIENT)
Dept: CARDIOLOGY CLINIC | Facility: CLINIC | Age: 81
End: 2021-02-10

## 2021-02-10 NOTE — TELEPHONE ENCOUNTER
COVID Pre-Visit Screening     1  Have you traveled outside of your state in the past 2 weeks? NO  2  Do you presently have a fever or flu-like symptoms? No  3  Do you have symptoms of an upper respiratory infection like runny nose, sore throat, or cough? No  4  Are you suffering from new headache that you have not had in the past?  No  5  Do you have/have you experienced any new shortness of breath recently? No  6  Do you have any new diarrhea, nausea or vomiting? No  7  Have you been in contact with anyone who has been sick or diagnosed with COVID-19? No  8  Do you have any new loss of taste or smell? No  9  Are you able to wear a mask without a valve for the entire visit?  Yes

## 2021-02-11 ENCOUNTER — OFFICE VISIT (OUTPATIENT)
Dept: CARDIOLOGY CLINIC | Facility: CLINIC | Age: 81
End: 2021-02-11
Payer: MEDICARE

## 2021-02-11 ENCOUNTER — TELEPHONE (OUTPATIENT)
Dept: CARDIOLOGY CLINIC | Facility: CLINIC | Age: 81
End: 2021-02-11

## 2021-02-11 VITALS
WEIGHT: 171 LBS | DIASTOLIC BLOOD PRESSURE: 78 MMHG | BODY MASS INDEX: 25.33 KG/M2 | SYSTOLIC BLOOD PRESSURE: 174 MMHG | HEIGHT: 69 IN | OXYGEN SATURATION: 95 % | TEMPERATURE: 97.7 F | HEART RATE: 68 BPM

## 2021-02-11 DIAGNOSIS — I50.22 CHRONIC SYSTOLIC CHF (CONGESTIVE HEART FAILURE) (HCC): Primary | ICD-10-CM

## 2021-02-11 DIAGNOSIS — I31.3 PERICARDIAL EFFUSION: ICD-10-CM

## 2021-02-11 DIAGNOSIS — I10 ESSENTIAL HYPERTENSION: ICD-10-CM

## 2021-02-11 DIAGNOSIS — E11.9 CONTROLLED TYPE 2 DIABETES MELLITUS WITHOUT COMPLICATION, WITHOUT LONG-TERM CURRENT USE OF INSULIN (HCC): ICD-10-CM

## 2021-02-11 DIAGNOSIS — I48.91 ATRIAL FIBRILLATION, UNSPECIFIED TYPE (HCC): ICD-10-CM

## 2021-02-11 PROCEDURE — 99214 OFFICE O/P EST MOD 30 MIN: CPT | Performed by: INTERNAL MEDICINE

## 2021-02-11 RX ORDER — AMLODIPINE BESYLATE 2.5 MG/1
2.5 TABLET ORAL DAILY
COMMUNITY
Start: 2021-01-13 | End: 2021-06-23

## 2021-02-11 RX ORDER — SITAGLIPTIN 100 MG/1
100 TABLET, FILM COATED ORAL DAILY
COMMUNITY
Start: 2021-01-31 | End: 2022-01-09 | Stop reason: HOSPADM

## 2021-02-11 NOTE — PROGRESS NOTES
Cardiology Followup    Tank Charlton  [id number]  1940      Interval History: Tank Charlton is a 78y o  year old male who is here for followup of CHF and atrial fibrillation  During his last visit, he was complaining of bilateral lower extremity edema without any shortness of breath  He was switched from furosemide to torsemide  He is here for follow up  He has not had any improvement in LE edema  He denies any shortness of breath or chest pain  Still with edema that is up to his knee in both legs  He is using torsemide 20 mg daily  Weight has increased  Saw Dr Mic Bahena and lab work was done (results are unavailable at this time)  Was told creatinine had improved  Previously, had infection of left hand that has resolved  He is taking Eliquis 2 5 mg b i d  Along with furosemide 40 milligrams daily  In the past, he was noted to have an irregular heart beat during a visit with Dr Mic Bahena  Holter monitor showed the presence of atrial fibrillation and he was started on Eliquis  He had no symptoms at the time  Previously, he was having dyspnea with minimal exertion and was hospitalized in January 2019  While hospitalized, he was noted to have renal failure, CHF with an EF of 40% and frequent PVCs  Stress test was abnormal with a fixed inferolateral wall defect without ischemia  There was a small to moderate sized pericardial effusion  Past Medical History:   Diagnosis Date    Atrial fibrillation ([facility] )     Chronic kidney disease     Coronary artery disease     Diabetes mellitus ([facility] )     Hyperlipidemia     Hypertension      Social History     Socioeconomic History    Marital status:       Spouse name: Not on file    Number of children: 1    Years of education: 15    Highest education level: 12th grade   Occupational History    Not on file   Social Needs    Financial resource strain: Not on file    Food insecurity     Worry: Not on file Inability: Not on file    Transportation needs     Medical: Not on file     Non-medical: Not on file   Tobacco Use    Smoking status: Former Smoker    Smokeless tobacco: Former User   Substance and Sexual Activity    Alcohol use: Not Currently     Binge frequency: Less than monthly     Comment: spaecial occasions    Drug use: Not Currently    Sexual activity: Not on file   Lifestyle    Physical activity     Days per week: Not on file     Minutes per session: Not on file    Stress: Not on file   Relationships    Social connections     Talks on phone: Not on file     Gets together: Not on file     Attends Hoahaoism service: Not on file     Active member of club or organization: Not on file     Attends meetings of clubs or organizations: Not on file     Relationship status: Not on file    Intimate partner violence     Fear of current or ex partner: Not on file     Emotionally abused: Not on file     Physically abused: Not on file     Forced sexual activity: Not on file   Other Topics Concern    Not on file   Social History Narrative    Not on file      Family History   Problem Relation Age of Onset    Heart disease Mother     Diabetes Father     Vision loss Father     Cancer Sister     Diabetes Sister      Past Surgical History:   Procedure Laterality Date    EYE SURGERY      SKIN CANCER EXCISION      TONSILLECTOMY         Current Outpatient Medications:     acetaminophen (TYLENOL) 325 mg tablet, Take 2 tablets (650 mg total) by mouth every 6 (six) hours as needed for mild pain, Disp: , Rfl: 0    allopurinol (ZYLOPRIM) 100 mg tablet, Take 100 mg by mouth 2 (two) times a day, Disp: , Rfl:     ALPRAZolam (XANAX) 0 5 mg tablet, 0 5 mg daily at bedtime , Disp: , Rfl: 0    amLODIPine (NORVASC) 2 5 mg tablet, Take 2 5 mg by mouth daily, Disp: , Rfl:     ascorbic acid (VITAMIN C) 500 MG tablet, Take 1 tablet (500 mg total) by mouth daily, Disp: , Rfl: 0    atorvastatin (LIPITOR) 40 mg tablet, Take 1 tablet (40 mg total) by mouth daily with dinner, Disp: 30 tablet, Rfl: 0    Blood Pressure Monitor NILTON, by Does not apply route daily, Disp: 1 Device, Rfl: 0    carvedilol (COREG) 6 25 mg tablet, Take 1 tablet (6 25 mg total) by mouth 2 (two) times a day with meals, Disp: 60 tablet, Rfl: 0    cholecalciferol (VITAMIN D3) 1,000 units tablet, Take 1 tablet (1,000 Units total) by mouth daily, Disp: 60 tablet, Rfl: 3    ELIQUIS 2 5 MG, TAKE ONE TABLET BY MOUTH TWICE A DAY, Disp: 60 tablet, Rfl: 11    glimepiride (AMARYL) 4 mg tablet, Take 4 mg by mouth daily with breakfast , Disp: , Rfl:     halobetasol (ULTRAVATE) 0 05 % cream, Apply 1 application topically daily as needed (Ezcema)  , Disp: , Rfl: 0    isosorbide mononitrate (IMDUR) 30 mg 24 hr tablet, Take 1 tablet (30 mg total) by mouth daily, Disp: 30 tablet, Rfl: 0    latanoprost (XALATAN) 0 005 % ophthalmic solution, 1 drop daily at bedtime, Disp: , Rfl:     sitaGLIPtin (JANUVIA) 25 mg tablet, Take 1 tablet (25 mg total) by mouth daily (Patient taking differently: Take 50 mg by mouth daily ), Disp: 30 tablet, Rfl: 0    timolol (TIMOPTIC) 0 5 % ophthalmic solution, Administer 1 drop to both eyes daily, Disp: , Rfl:     torsemide (DEMADEX) 20 mg tablet, Take 1 tablet (20 mg total) by mouth daily, Disp: 30 tablet, Rfl: 3    bacitracin topical ointment 500 units/g topical ointment, Apply 1 large application topically 2 (two) times a day (Patient not taking: Reported on 2/11/2021), Disp: 15 g, Rfl: 0    Januvia 100 MG tablet, Take 100 mg by mouth daily, Disp: , Rfl:   Allergies   Allergen Reactions    Sulfa Antibiotics     Sulfur          Review of Systems:  Review of Systems   Constitutional: Negative for chills and fever  HENT: Negative for ear pain and sore throat  Eyes: Negative for pain and visual disturbance  Respiratory: Negative for cough and shortness of breath  Cardiovascular: Positive for leg swelling   Negative for chest pain and palpitations  Gastrointestinal: Negative for abdominal pain and vomiting  Genitourinary: Negative for dysuria and hematuria  Musculoskeletal: Positive for arthralgias  Negative for back pain  Skin: Negative for color change and rash  Neurological: Negative for seizures and syncope  All other systems reviewed and are negative  Physical Exam:  Vitals:    02/11/21 1124   BP: (!) 174/78   BP Location: Right arm   Patient Position: Sitting   Cuff Size: Standard   Pulse: 68   Temp: 97 7 °F (36 5 °C)   SpO2: 95%   Weight: 77 6 kg (171 lb)   Height: 5' 9" (1 753 m)     Physical Exam   Constitutional: He is oriented to person, place, and time  He appears well-developed  No distress  HENT:   Head: Normocephalic and atraumatic  Eyes: Pupils are equal, round, and reactive to light  Conjunctivae and EOM are normal    Neck: Neck supple  No JVD present  No thyromegaly present  Cardiovascular: Normal rate  An irregularly irregular rhythm present  Exam reveals no gallop and no friction rub  Murmur heard  Pulmonary/Chest: Effort normal and breath sounds normal    Musculoskeletal:         General: Edema (2+ to knee) present  Neurological: He is alert and oriented to person, place, and time  No cranial nerve deficit  Skin: Skin is warm and dry  No rash noted  He is not diaphoretic  No erythema  Psychiatric: He has a normal mood and affect  His behavior is normal  Judgment and thought content normal        Labs: reviewed    Imaging: No results found  ECG: Atrial fibrillation at 70 bpm    Discussion/Summary:  1  Chronic systolic CHF (congestive heart failure) ([facility] )    2  Essential hypertension    3  Atrial fibrillation, unspecified type ([facility] )    4  Controlled type 2 diabetes mellitus without complication, without long-term current use of insulin ([facility] )    5  Pericardial effusion      - Patient did not have any improvement in LE edema since last visit  Weight has increased    Recommend increasing torsemide to 40 mg daily  If no improvement, will need admission to hospital for IV diuretics  - He should go to ER if any shortness of breath or edema continues to worsen  - Will follow up in office in 1 week  - Continue Eliquis 2 5 mg twice a day  - Monitor daily weights  - Following up with nephrology for renal failure  - continue carvedilol 6 25 mg b i d

## 2021-02-11 NOTE — TELEPHONE ENCOUNTER
Son called, patient has been taking torsemide 20mg qd, states that he was told to take two, wanted to ensure right mg was in chart? Wanted to verify mg were okay

## 2021-02-11 NOTE — TELEPHONE ENCOUNTER
I spoke with Gabbie Edge; made aware of recommendation  Clarification was given     No further action

## 2021-02-12 DIAGNOSIS — Z23 ENCOUNTER FOR IMMUNIZATION: ICD-10-CM

## 2021-02-22 ENCOUNTER — OFFICE VISIT (OUTPATIENT)
Dept: CARDIOLOGY CLINIC | Facility: CLINIC | Age: 81
End: 2021-02-22

## 2021-02-22 VITALS
OXYGEN SATURATION: 92 % | BODY MASS INDEX: 22.85 KG/M2 | WEIGHT: 154.3 LBS | HEIGHT: 69 IN | DIASTOLIC BLOOD PRESSURE: 62 MMHG | TEMPERATURE: 97.9 F | SYSTOLIC BLOOD PRESSURE: 126 MMHG | HEART RATE: 53 BPM

## 2021-02-22 DIAGNOSIS — I10 ESSENTIAL HYPERTENSION: ICD-10-CM

## 2021-02-22 DIAGNOSIS — I48.0 PAF (PAROXYSMAL ATRIAL FIBRILLATION) (HCC): ICD-10-CM

## 2021-02-22 DIAGNOSIS — I50.22 CHRONIC SYSTOLIC CONGESTIVE HEART FAILURE (HCC): Primary | ICD-10-CM

## 2021-02-22 DIAGNOSIS — N18.4 CKD (CHRONIC KIDNEY DISEASE) STAGE 4, GFR 15-29 ML/MIN (HCC): ICD-10-CM

## 2021-02-22 PROCEDURE — 99214 OFFICE O/P EST MOD 30 MIN: CPT | Performed by: INTERNAL MEDICINE

## 2021-02-22 NOTE — PROGRESS NOTES
Cardiology Followup    Balbina Smith  [id number]  1940      Interval History: Balbina Smith is a 78y o  year old male who is here for followup of CHF and atrial fibrillation  During his last visit, torsemide was increased to 40 mg daily because of worsening lower extremity edema  In addition, he has reduced amount of salt in his diet  Since then, he has lost 15 pounds and LE edema has resolved  He denies any shortness of breath, orthopnea, PND or chest pain  In the past, he was noted to have an irregular heart beat during a visit with Dr Daniel Mesa  Holter monitor showed the presence of atrial fibrillation and he was started on Eliquis  He had no symptoms at the time  Previously, he was having dyspnea with minimal exertion and was hospitalized in January 2019  While hospitalized, he was noted to have renal failure, CHF with an EF of 40% and frequent PVCs  Stress test was abnormal with a fixed inferolateral wall defect without ischemia  There was a small to moderate sized pericardial effusion  Past Medical History:   Diagnosis Date    [facility] fibrillation ([facility] )     Chronic kidney disease     Coronary artery disease     Diabetes mellitus ([facility] )     Hyperlipidemia     Hypertension      Social History     Socioeconomic History    Marital status:       Spouse name: Not on file    Number of children: 1    Years of education: 15    Highest education level: 12th grade   Occupational History    Not on file   Social Needs    Financial resource strain: Not on file    Food insecurity     Worry: Not on file     Inability: Not on file   [employer] needs     Medical: Not on file     Non-medical: Not on file   Tobacco Use    Smoking status: Former Smoker    Smokeless tobacco: Former User   Substance and Sexual Activity    Alcohol use: Not Currently     Binge frequency: Less than monthly     Comment: spaecial occasions    Drug use: Not Currently    Sexual activity: Not on file   Lifestyle    Physical activity     Days per week: Not on file     Minutes per session: Not on file    Stress: Not on file   Relationships    Social connections     Talks on phone: Not on file     Gets together: Not on file     Attends Uats[employer] service: Not on file     Active member of club or organization: Not on file     Attends meetings of clubs or organizations: Not on file     Relationship status: Not on file    Intimate partner violence     Fear of current or ex partner: Not on file     Emotionally abused: Not on file     Physically abused: Not on file     Forced sexual activity: Not on file   Other Topics Concern    Not on file   Social History Narrative    Not on file      Family History   Problem Relation Age of Onset    Heart disease Mother     Diabetes Father     Vision loss Father     Cancer Sister     Diabetes Sister      Past Surgical History:   Procedure Laterality Date    EYE SURGERY      SKIN CANCER EXCISION      TONSILLECTOMY         Current Outpatient Medications:     acetaminophen (TYLENOL) 325 mg tablet, Take 2 tablets (650 mg total) by mouth every 6 (six) hours as needed for mild pain, Disp: , Rfl: 0    allopurinol (ZYLOPRIM) 100 mg tablet, Take 100 mg by mouth 2 (two) times a day, Disp: , Rfl:     ALPRAZolam (XANAX) 0 5 mg tablet, 0 5 mg daily at bedtime , Disp: , Rfl: 0    amLODIPine (NORVASC) 2 5 mg tablet, Take 2 5 mg by mouth daily, Disp: , Rfl:     ascorbic acid (VITAMIN C) 500 MG tablet, Take 1 tablet (500 mg total) by mouth daily, Disp: , Rfl: 0    atorvastatin (LIPITOR) 40 mg tablet, Take 1 tablet (40 mg total) by mouth daily with dinner, Disp: 30 tablet, Rfl: 0    Blood Pressure Monitor NILTON, by Does not apply route daily, Disp: 1 Device, Rfl: 0    carvedilol (COREG) 6 25 mg tablet, Take 1 tablet (6 25 mg total) by mouth 2 (two) times a day with meals, Disp: 60 tablet, Rfl: 0    cholecalciferol (VITAMIN D3) 1,000 units tablet, Take 1 tablet (1,000 Units total) by mouth daily, Disp: 60 tablet, Rfl: 3    ELIQUIS 2 5 MG, TAKE ONE TABLET BY MOUTH TWICE A DAY, Disp: 60 tablet, Rfl: 11    glimepiride (AMARYL) 4 mg tablet, Take 4 mg by mouth daily with breakfast , Disp: , Rfl:     halobetasol (ULTRAVATE) 0 05 % cream, Apply 1 application topically daily as needed (Ezcema)  , Disp: , Rfl: 0    isosorbide mononitrate (IMDUR) 30 mg 24 hr tablet, Take 1 tablet (30 mg total) by mouth daily, Disp: 30 tablet, Rfl: 0    Januvia 100 MG tablet, Take 100 mg by mouth daily, Disp: , Rfl:     latanoprost (XALATAN) 0 005 % ophthalmic solution, 1 drop daily at bedtime, Disp: , Rfl:     sitaGLIPtin (JANUVIA) 25 mg tablet, Take 1 tablet (25 mg total) by mouth daily (Patient taking differently: Take 50 mg by mouth daily ), Disp: 30 tablet, Rfl: 0    timolol (TIMOPTIC) 0 5 % ophthalmic solution, Administer 1 drop to both eyes daily, Disp: , Rfl:     torsemide (DEMADEX) 20 mg tablet, Take 1 tablet (20 mg total) by mouth daily, Disp: 30 tablet, Rfl: 3    bacitracin topical ointment 500 units/g topical ointment, Apply 1 large application topically 2 (two) times a day (Patient not taking: Reported on 2/11/2021), Disp: 15 g, Rfl: 0  Allergies   Allergen Reactions    Sulfa Antibiotics     Sulfur          Review of Systems:  Review of Systems   Constitutional: Negative for chills and fever  HENT: Negative for ear pain and sore throat  Eyes: Negative for pain and visual disturbance  Respiratory: Negative for cough and shortness of breath  Cardiovascular: Negative for chest pain and palpitations  Gastrointestinal: Negative for abdominal pain and vomiting  Genitourinary: Negative for dysuria and hematuria  Musculoskeletal: Positive for arthralgias  Negative for back pain  Skin: Negative for color change and rash  Neurological: Negative for seizures and syncope  All other systems reviewed and are negative        Physical Exam:  Vitals:    02/22/21 1058   BP: 126/62   BP Location: Right arm   Patient Position: Sitting   Cuff Size: Standard   Pulse: (!) 53   Temp: 97 9 °F (36 6 °C)   TempSrc: Temporal   SpO2: 92%   Weight: 70 kg (154 lb 4 8 oz)   Height: 5' 9" (1 753 m)     Physical Exam   Constitutional: He is oriented to person, place, and time  He appears well-developed  No distress  HENT:   Head: Normocephalic and atraumatic  Eyes: Pupils are equal, round, and reactive to light  Conjunctivae and EOM are normal    Neck: Neck supple  No JVD present  No thyromegaly present  Cardiovascular: Normal rate  An irregularly irregular rhythm present  Exam reveals no gallop and no friction rub  Murmur heard  Pulmonary/Chest: Effort normal and breath sounds normal    Abdominal: Soft  He exhibits no distension  There is no abdominal tenderness  Musculoskeletal:         General: No edema  Neurological: He is alert and oriented to person, place, and time  No cranial nerve deficit  Skin: Skin is warm and dry  No rash noted  He is not diaphoretic  No erythema  Psychiatric: He has a normal mood and affect  His behavior is normal  Judgment and thought content normal        Labs: reviewed    Imaging: No results found  ECG: Atrial fibrillation at 70 bpm    Discussion/Summary:  1  Chronic systolic congestive heart failure ([facility] )    2  Essential hypertension    3  PAF (paroxysmal atrial fibrillation) ([facility] )    4  CKD (chronic kidney disease) stage 4, GFR 15-29 ml/min ([facility]      - Patient has had marked improvement in edema with increase of torsemide to 40 mg daily and diet modificaiton  - May reduced torsemide to 20 mg daily  Monitor ankles for edema and daily weights  If any swelling develop, he should increase torsemide to 40 mg   - Will obtain BMP as he had large volume diuresis  - Continue Eliquis 2 5 mg twice a day  - Monitor daily weights  - Following up with nephrology for renal failure  - continue carvedilol 6 25 mg b i d

## 2021-03-02 ENCOUNTER — DOCUMENTATION (OUTPATIENT)
Dept: NEPHROLOGY | Facility: CLINIC | Age: 81
End: 2021-03-02

## 2021-03-02 LAB
CO2 SERPL-SCNC: 24 MMOL/L (ref 21–32)
CREAT ?TM UR-SCNC: 27.9 UMOL/L
EXT GLUCOSE BLD: 235
EXT MICROALBUMIN URINE RANDOM: 69.1
EXTERNAL ALBUMIN: 3.7
EXTERNAL ALK PHOS: 156
EXTERNAL ALT: 36
EXTERNAL AST: 39
EXTERNAL BUN: 57
EXTERNAL CALCIUM: 8.2
EXTERNAL CHLORIDE: 100
EXTERNAL CREATININE: 2.16
EXTERNAL EGFR: 28
EXTERNAL MAGNESIUM: 2.2
EXTERNAL PHOSPHORUS: 4
EXTERNAL POTASSIUM: 4.4
EXTERNAL SODIUM: 137
EXTERNAL T.BILIRUBIN: 0.6
EXTERNAL TOTAL PROTEIN: 5.9
EXTERNAL VITAMIN D,25: 33.3
HBA1C MFR BLD HPLC: 7.1 %
HCT VFR BLD AUTO: 35.9 % (ref 36.5–49.3)
HGB BLD-MCNC: 12.2 G/DL (ref 12–17)
MICROALBUMIN/CREAT UR: 248 MG/G{CREAT}
PLATELET # BLD AUTO: 113 THOUSANDS/UL (ref 149–390)
WBC # BLD AUTO: 4.2 THOUSAND/UL

## 2021-03-04 ENCOUNTER — TELEPHONE (OUTPATIENT)
Dept: NEPHROLOGY | Facility: CLINIC | Age: 81
End: 2021-03-04

## 2021-03-04 ENCOUNTER — OFFICE VISIT (OUTPATIENT)
Dept: NEPHROLOGY | Facility: CLINIC | Age: 81
End: 2021-03-04
Payer: MEDICARE

## 2021-03-04 VITALS
BODY MASS INDEX: 23.37 KG/M2 | HEIGHT: 69 IN | SYSTOLIC BLOOD PRESSURE: 152 MMHG | DIASTOLIC BLOOD PRESSURE: 80 MMHG | WEIGHT: 157.8 LBS

## 2021-03-04 DIAGNOSIS — I50.22 CHRONIC SYSTOLIC CONGESTIVE HEART FAILURE (HCC): ICD-10-CM

## 2021-03-04 DIAGNOSIS — N18.4 CKD (CHRONIC KIDNEY DISEASE) STAGE 4, GFR 15-29 ML/MIN (HCC): Primary | ICD-10-CM

## 2021-03-04 DIAGNOSIS — I12.9 TYPE 2 DIABETES MELLITUS WITH STAGE 4 CHRONIC KIDNEY DISEASE AND HYPERTENSION (HCC): ICD-10-CM

## 2021-03-04 DIAGNOSIS — N18.4 TYPE 2 DIABETES MELLITUS WITH STAGE 4 CHRONIC KIDNEY DISEASE AND HYPERTENSION (HCC): ICD-10-CM

## 2021-03-04 DIAGNOSIS — E11.22 TYPE 2 DIABETES MELLITUS WITH STAGE 4 CHRONIC KIDNEY DISEASE AND HYPERTENSION (HCC): ICD-10-CM

## 2021-03-04 PROCEDURE — 99214 OFFICE O/P EST MOD 30 MIN: CPT | Performed by: INTERNAL MEDICINE

## 2021-03-04 NOTE — TELEPHONE ENCOUNTER
COVID Pre-Visit Screening     1  Is this a family member screening? No  2  Have you traveled outside of your state in the past 2 weeks? No  3  Do you presently have a fever or flu-like symptoms? No  4  Do you have symptoms of an upper respiratory infection like runny nose, sore throat, or cough? No  5  Are you suffering from new headache that you have not had in the past?  No  6  Do you have/have you experienced any new shortness of breath recently? No  7  Do you have any new diarrhea, nausea or vomiting? No  8  Have you been in contact with anyone who has been sick or diagnosed with COVID-19? No  9  Do you have any new loss of taste or smell? No  10  Are you able to wear a mask without a valve for the entire visit? Yes     Patient and patient's son were both screened and both stated they have not been tested for COVID in the last 14 days  COVID Pre-Visit Screening     11  Is this a family member screening? Yes  12  Have you traveled outside of your state in the past 2 weeks? No  13  Do you presently have a fever or flu-like symptoms? No  14  Do you have symptoms of an upper respiratory infection like runny nose, sore throat, or cough? No  15  Are you suffering from new headache that you have not had in the past?  No  16  Do you have/have you experienced any new shortness of breath recently? No  17  Do you have any new diarrhea, nausea or vomiting? No  18  Have you been in contact with anyone who has been sick or diagnosed with COVID-19? No  19  Do you have any new loss of taste or smell? No  20  Are you able to wear a mask without a valve for the entire visit?  Yes

## 2021-03-04 NOTE — PATIENT INSTRUCTIONS
1  )  Low 2 g sodium diet    2 )  Monitor weights at home    3 )  Avoid NSAIDs (ibuprofen, Motrin, Advil, Aleve, naproxen)    4 )  Monitor blood pressure at home, call if blood pressure greater than 150/90 persistently    5 ) I will plan to discuss all results including blood work, imaging at our next visit, unless there is an urgent indication, in which case I will call you earlier  If you have any questions or concerns about your results, please feel free to call our office      6 ) Still please see Vascular Surgery for preparation in access, for future

## 2021-03-04 NOTE — PROGRESS NOTES
NEPHROLOGY OFFICE VISIT   Sean Jimenez 78 y o  male MRN: [id number]  3/4/2021    Reason for Visit:  Chronic kidney disease    History of Present Illness (HPI):    I had the pleasure of seeing  Ayse Evans today in the renal clinic for the continued management of  Chronic kidney disease  Since our last visit,  He was hospitalized for few days in January of 2021 for cellulitis of his left hand after a fall  His renal function had been stable not requiring a renal consultation at that time  The last time I had seen him back in November his creatinine increased to 2 89 mg/dL and his GFR have fell below 20 cc/minute  At that time I decided to have him evaluated by vascular surgery for vein mapping and placement of an access  I did give him a referral unfortunately they reported that they did not get a call and subsequently have not seen vascular  But since January to the most recent blood work his creatinine has actually been below his baseline with a GFR greater than 25 cc/minute but below 30 cc/minute  His electrolytes are stable with hemoglobin is normal   He exhibits no uremic symptoms  ASSESSMENT and PLAN:    1 ) Chronic kidney disease stage IV  -presumed etiology is most likely diabetic nephropathy, with possible component of hypertensive nephrosclerosis and arterial sclerosis   May even have a component of renal vascular disease  -urine protein creatinine ratio 0 5  -baseline creatinine 2 5-3 mg/dL,   But more recently has been below baseline ranging anywhere from 2 1-2 2 mg/dL  -I have discussed different dialysis modalities with him  -Chronic Kidney Disease education/Kid[facility]:  Attended  -no urgent indication for dialysis at this time  -avoid NSAIDs  -renal ultrasound shows asymmetric kidneys   His right kidney measures 8 5 cm and his left kidney measures 10 9 cm   No evidence of hydronephrosis    -diabetic and blood pressure control  -renal function currently stable with a creatinine of 2 87 mg/dL, will need to obtain most recent labs  -no overt uremic symptoms no urgent indication for renal replacement therapy  -plan for placement of dialysis access  Will refer to vascular surgery and order vein mapping for placement of an AV graft      2  ) Chronic systolic congestive heart failure  -ejection fraction 40%  -follows with Cardiology- Dr Matt Flores  -premature ventricular contraction, pericardial effusion, was moderate on the last echocardiogram  -currently on furosemide 40 mg daily, increase Lasix to 40 mg twice a day or 80 mg daily due to increased weight gain  -EDW around 150-155 lbs,  Close to target  -daily weights  -low 2 g sodium diet  -carvedilol 6 25 mg twice a day  -isosorbide mononitrate 30 mg daily     3  ) Hypertension  -blood pressure at goal in the office  -aim for less than 130/80  -amlodipine 2 5 mg daily  -carvedilol 6 25 mg twice a day  - torsemide 20 mg daily  -isosorbide mononitrate 30 mg daily  -low 2 g sodium diet  - blood pressure at target at his cardiology visit back in January  Slightly above target today but I did above his coat     4 )  Diabetes mellitus type 2  -hemoglobin A1c: 7 1 (A1C values may be falsely elevated or decreased in those with CKD, assay method should be certified by [employer])   Target A1C < 7  -current medications:  Glimepiride, I would recommend the addition of SGLT2 inhibitors setting of Chronic Kidney Disease heart failure  -proteinuria:  Yes, urine protein creatinine ratio 0 4  -retinopathy:  No  -neuropathy:  No  -please follow with an ophthalmologist and podiatrist every year  -continued self monitoring of blood glucose at home  -Treatment Management in CKD Recommendations:   · Metformin:  Avoid if creatinine clearance is less than 30 cc/min (concern for lactic acidosis)  · Sodium-Glucose Cotransporter-2 (SGLT2) Inhibitors:  May see an acute drop in the eGFR initially when starting the medication but then a stabilization of the renal function, with slower loss of renal function as compared to placebo   Relative risk of end-stage renal disease, doubling of serum creatinine or death from renal causes were also found to be lower as compared to placebo  ([facility])   Would recommend starting at 100 mg daily, I would avoid use in patients with eGFR < 30 cc/min   If no contraindications exist I would recommend this medication added to the diabetic regiment  · Sulfonylureas:  Preferred short-acting (glipizide, glimepiride, repaglinide), relatively safe in patients with non dialysis CKD  · Thiazolidinedones/Alpha-Gluosidase inhibitors/Dipeptidyl peptidase-4 inhibitors:  Generally not considered first-line agents in CKD (limited data in long-term safety and efficacy)  · Insulin:  Starting dose may need to be lower than what would ordinarily be used, as there is a decrease metabolism of insulin (no dose adjustment if the GFR > 50 mL/min, dose should be reduced to 75% of baseline if GFR 10-50 mL/min, and 50% baseline if GFR < 10 mL/min)           PATIENT INSTRUCTIONS:    Patient Instructions   1 )  Low 2 g sodium diet    2 )  Monitor weights at home    3 )  Avoid NSAIDs (ibuprofen, Motrin, Advil, Aleve, naproxen)    4 )  Monitor blood pressure at home, call if blood pressure greater than 150/90 persistently    5 ) I will plan to discuss all results including blood work, imaging at our next visit, unless there is an urgent indication, in which case I will call you earlier  If you have any questions or concerns about your results, please feel free to call our office  6 ) Still please see Vascular Surgery for preparation in access, for future          Orders Placed This Encounter   Procedures    Comprehensive metabolic panel     This is a patient instruction: Patient fasting for 8 hours or longer recommended       Standing Status:   Future     Number of Occurrences:   1     Standing Expiration Date:   3/4/2022    CBC     Standing Status:   Future Number of Occurrences:   1     Standing Expiration Date:   3/4/2022    Vitamin D 25 hydroxy     Standing Status:   Future     Number of Occurrences:   1     Standing Expiration Date:   3/4/2022    PTH, intact     Standing Status:   Future     Number of Occurrences:   1     Standing Expiration Date:   3/4/2022    Phosphorus     Standing Status:   Future     Number of Occurrences:   1     Standing Expiration Date:   3/4/2022    Microalbumin / creatinine urine ratio     Standing Status:   Future     Number of Occurrences:   1     Standing Expiration Date:   3/4/2022    Ambulatory referral to Vascular Surgery     Standing Status:   Future     Standing Expiration Date:   3/4/2022     Referral Priority:   Routine     Referral Type:   Consult - AMB     Referral Reason:   Specialty Services Required     Requested Specialty:   Vascular Surgery     Number of Visits Requested:   1     Expiration Date:   3/4/2022       OBJECTIVE:  Current Weight: Weight - Scale: 71 6 kg (157 lb 12 8 oz)  Vitals:    03/04/21 1252 03/04/21 1314   BP:  152/80   Weight: 71 6 kg (157 lb 12 8 oz)    Height: 5' 9" (1 753 m)     Body mass index is 23 3 kg/m²  REVIEW OF SYSTEMS:    Review of Systems   Constitutional: Negative for activity change and fever  Respiratory: Negative for cough, chest tightness, shortness of breath and wheezing  Cardiovascular: Negative for chest pain and leg swelling  Gastrointestinal: Negative for abdominal pain, diarrhea, nausea and vomiting  Endocrine: Negative for polyuria  Genitourinary: Negative for difficulty urinating, dysuria, flank pain, frequency and urgency  Skin: Negative for rash  Neurological: Negative for dizziness, syncope, light-headedness and headaches  PHYSICAL EXAM:      Physical Exam  Vitals signs and nursing note reviewed  Exam conducted with a chaperone present  Constitutional:       General: He is not in acute distress  Appearance: He is well-developed   He is not diaphoretic  HENT:      Head: Normocephalic and atraumatic  Eyes:      General: No scleral icterus  Pupils: Pupils are equal, round, and reactive to light  Neck:      Musculoskeletal: Normal range of motion and neck supple  Cardiovascular:      Rate and Rhythm: Normal rate and regular rhythm  Heart sounds: Normal heart sounds  No murmur  No friction rub  No gallop  Pulmonary:      Effort: Pulmonary effort is normal  No respiratory distress  Breath sounds: Normal breath sounds  No wheezing or rales  Chest:      Chest wall: No tenderness  Abdominal:      General: Bowel sounds are normal  There is no distension  Palpations: Abdomen is soft  Tenderness: There is no abdominal tenderness  There is no rebound  Musculoskeletal: Normal range of motion  Comments: Trace swelling   Skin:     Findings: No lesion or rash  Comments: Left hand wound seen on the left hand appears to be healing   Neurological:      Mental Status: He is alert and oriented to person, place, and time           Medications:    Current Outpatient Medications:     acetaminophen (TYLENOL) 325 mg tablet, Take 2 tablets (650 mg total) by mouth every 6 (six) hours as needed for mild pain, Disp: , Rfl: 0    allopurinol (ZYLOPRIM) 100 mg tablet, Take 100 mg by mouth 2 (two) times a day, Disp: , Rfl:     ALPRAZolam (XANAX) 0 5 mg tablet, 0 5 mg daily at bedtime , Disp: , Rfl: 0    amLODIPine (NORVASC) 2 5 mg tablet, Take 2 5 mg by mouth daily, Disp: , Rfl:     ascorbic acid (VITAMIN C) 500 MG tablet, Take 1 tablet (500 mg total) by mouth daily, Disp: , Rfl: 0    atorvastatin (LIPITOR) 40 mg tablet, Take 1 tablet (40 mg total) by mouth daily with dinner, Disp: 30 tablet, Rfl: 0    carvedilol (COREG) 6 25 mg tablet, Take 1 tablet (6 25 mg total) by mouth 2 (two) times a day with meals, Disp: 60 tablet, Rfl: 0    cholecalciferol (VITAMIN D3) 1,000 units tablet, Take 1 tablet (1,000 Units total) by mouth daily, Disp: 60 tablet, Rfl: 3    ELIQUIS 2 5 MG, TAKE ONE TABLET BY MOUTH TWICE A DAY, Disp: 60 tablet, Rfl: 11    glimepiride (AMARYL) 4 mg tablet, Take 4 mg by mouth daily with breakfast , Disp: , Rfl:     halobetasol (ULTRAVATE) 0 05 % cream, Apply 1 application topically daily as needed (Ezcema)  , Disp: , Rfl: 0    isosorbide mononitrate (IMDUR) 30 mg 24 hr tablet, Take 1 tablet (30 mg total) by mouth daily, Disp: 30 tablet, Rfl: 0    Januvia 100 MG tablet, Take 100 mg by mouth daily, Disp: , Rfl:     latanoprost (XALATAN) 0 005 % ophthalmic solution, 1 drop daily at bedtime, Disp: , Rfl:     timolol (TIMOPTIC) 0 5 % ophthalmic solution, Administer 1 drop to both eyes daily, Disp: , Rfl:     torsemide (DEMADEX) 20 mg tablet, Take 1 tablet (20 mg total) by mouth daily, Disp: 30 tablet, Rfl: 3    bacitracin topical ointment 500 units/g topical ointment, Apply 1 large application topically 2 (two) times a day (Patient not taking: Reported on 2/11/2021), Disp: 15 g, Rfl: 0    Blood Pressure Monitor NILTON, by Does not apply route daily, Disp: 1 Device, Rfl: 0    sitaGLIPtin (JANUVIA) 25 mg tablet, Take 1 tablet (25 mg total) by mouth daily (Patient not taking: Reported on 3/4/2021), Disp: 30 tablet, Rfl: 0    Laboratory Results:        Invalid input(s): ALBUMIN    Results for orders placed or performed in visit on 03/02/21   CBC   Result Value Ref Range    WBC 4 20 Thousand/uL    Hemoglobin 12 2 12 0 - 17 0 g/dL    Hematocrit 35 9 (A) 36 5 - 49 3 %    Platelets 943 (A) 634 - 390 Thousands/uL   Comprehensive metabolic panel   Result Value Ref Range    SODIUM 137     POTASSIUM 4 4     CHLORIDE 100     CO2 24 21 - 32 mmol/L    ANION GAP      BUN 57     CREATININE 2 16     Glucose 235     EXTERNAL CALCIUM 8 2     TOTAL PROTEIN 5 9     ALBUMIN 3 7     AST 39     ALT 36     ALK PHOS 156     EXTERNAL TOT   BILIRUBIN 0 6     EXTERNAL EGFR 28    Microalbumin / creatinine urine ratio   Result Value Ref Range    EXT Creatinine Urine 27 9     Microalbum  ,U,Random 69 1     EXTERNAL Microalb/Creat Ratio 248    Vitamin D 25 hydroxy   Result Value Ref Range    EXTERNAL VITAMIN D,25 33 3    Hemoglobin A1C   Result Value Ref Range    Hemoglobin A1C 7 1    Phosphorus   Result Value Ref Range    EXTERNAL PHOSPHORUS 4 0    Magnesium   Result Value Ref Range    EXTERNAL MAGNESIUM 2 2

## 2021-04-01 ENCOUNTER — HOSPITAL ENCOUNTER (OUTPATIENT)
Dept: NON INVASIVE DIAGNOSTICS | Facility: HOSPITAL | Age: 81
Discharge: HOME | End: 2021-04-01
Attending: INTERNAL MEDICINE
Payer: MEDICARE

## 2021-04-01 DIAGNOSIS — I50.22 CHRONIC SYSTOLIC CONGESTIVE HEART FAILURE (HCC): ICD-10-CM

## 2021-04-01 PROCEDURE — 93306 TTE W/DOPPLER COMPLETE: CPT

## 2021-04-02 PROCEDURE — 93306 TTE W/DOPPLER COMPLETE: CPT | Performed by: INTERNAL MEDICINE

## 2021-04-05 ENCOUNTER — TELEPHONE (OUTPATIENT)
Dept: CARDIOLOGY CLINIC | Facility: CLINIC | Age: 81
End: 2021-04-05

## 2021-04-05 NOTE — TELEPHONE ENCOUNTER
Pt 's Son-González relayed Echo results  Pt feels well;not having any shortness of breath  Swelling is not out of the normal  Pt has a scheduled appt  in May-will call if needing an appt  if with symptoms to be seen sooner

## 2021-04-05 NOTE — TELEPHONE ENCOUNTER
----- Message from [place] Wendy,  sent at 4/2/2021  6:26 PM EDT -----  Can you please let the patient know echo was unchanged from the last one - still with fluid around the heart too that hasn't changed a large amount from previous  If he is feeling good, stay on current medications    If swelling or shortness of breath - come in sooner than next month

## 2021-05-03 ENCOUNTER — NURSE TRIAGE (OUTPATIENT)
Dept: OTHER | Facility: OTHER | Age: 81
End: 2021-05-03

## 2021-05-03 DIAGNOSIS — Z11.59 SPECIAL SCREENING EXAMINATION FOR VIRAL DISEASE: Primary | ICD-10-CM

## 2021-05-03 NOTE — TELEPHONE ENCOUNTER
Regarding: covid symptomatic   ----- Message from Crow Matthews sent at 5/3/2021  4:45 PM EDT -----  " My father has a cough no known exposure "  1  Were you within 6 feet or less, for up to 15 minutes or more with a person that has a confirmed COVID-19 test? no  2  What was the date of your exposure? unknown  3  Are you experiencing any symptoms attributed to the virus?  (Assess for SOB, cough, fever, difficulty breathing) coughing  4  HIGH RISK: Do you have any history heart or lung conditions, weakened immune system, diabetes, Asthma, CHF, HIV, COPD, Chemo, renal failure, sickle cell, etc? Diabetes, kidney disease and afib  5   PREGNANCY: Are you pregnant or did you recently give birth? no

## 2021-05-03 NOTE — TELEPHONE ENCOUNTER
Reason for Disposition   [1] COVID-19 infection suspected by caller or triager AND [2] mild symptoms (cough, fever, or others) AND [3] no complications or SOB    Protocols used: CORONAVIRUS (COVID-19) DIAGNOSED OR SUSPECTED-ADULT-[place]

## 2021-05-04 DIAGNOSIS — Z11.59 SPECIAL SCREENING EXAMINATION FOR VIRAL DISEASE: ICD-10-CM

## 2021-05-04 PROCEDURE — U0003 INFECTIOUS AGENT DETECTION BY NUCLEIC ACID (DNA OR RNA); SEVERE ACUTE RESPIRATORY SYNDROME CORONAVIRUS 2 (SARS-COV-2) (CORONAVIRUS DISEASE [COVID-19]), AMPLIFIED PROBE TECHNIQUE, MAKING USE OF HIGH THROUGHPUT TECHNOLOGIES AS DESCRIBED BY CMS-2020-01-R: HCPCS | Performed by: FAMILY MEDICINE

## 2021-05-04 PROCEDURE — U0005 INFEC AGEN DETEC AMPLI PROBE: HCPCS | Performed by: FAMILY MEDICINE

## 2021-05-05 ENCOUNTER — TELEPHONE (OUTPATIENT)
Dept: OTHER | Facility: OTHER | Age: 81
End: 2021-05-05

## 2021-05-05 LAB — SARS-COV-2 RNA RESP QL NAA+PROBE: POSITIVE

## 2021-05-05 NOTE — RESULT ENCOUNTER NOTE
I spoke with Luis Fernando Santillan and let him know that his COVID-19 swab was positive  Continue symptomatic treatment  Advised he implement home isolation measures including      Staying home  Stay in a specific "sick room" or area and away from other people or animals, including pets  Wear a mask when leaving your room  Use a separate bathroom, if available  Wipe down all commonly touched surfaces with household   Please schedule follow up video visit with your PCP's office

## 2021-05-05 NOTE — TELEPHONE ENCOUNTER
Your test for the novel coronavirus, also known as COVID-19, was positive  The sample showed that the virus was present  Positive COVID-19 test results are reportable to the [facility]  You may receive a call from trained public health staff to conduct an interview  It is important to answer their call  They will ask you to verify who you are  During the call they will ask you about what symptoms you have, what you did before you got sick, and who you were close to while sick  The health department does this to make sure everyone stays healthy and to reduce the spread of the virus  If you would like to verify if the caller does in fact work in contact tracing, call the [facility]  For additional information, please visit the [address] [place] [address] website  If you have any additional questions, we can schedule a virtual visit for you with a provider or call the [facility] [phone], option [phone], for care advice    For additional information, please visit the Coronavirus FAQ on the [facility] home page ([email or web address]  [email or web address])

## 2021-05-05 NOTE — TELEPHONE ENCOUNTER
Provided CDC criteria for ending home isolation after a positive COVID-19 test and strongly recommended that patient contact his PCP's office to notify of test results and to make a virtual appointment, if needed

## 2021-06-23 ENCOUNTER — OFFICE VISIT (OUTPATIENT)
Dept: CARDIOLOGY CLINIC | Facility: CLINIC | Age: 81
End: 2021-06-23
Payer: MEDICARE

## 2021-06-23 VITALS
HEIGHT: 69 IN | DIASTOLIC BLOOD PRESSURE: 62 MMHG | TEMPERATURE: 97.5 F | SYSTOLIC BLOOD PRESSURE: 124 MMHG | BODY MASS INDEX: 23.7 KG/M2 | OXYGEN SATURATION: 99 % | WEIGHT: 160 LBS | HEART RATE: 84 BPM

## 2021-06-23 DIAGNOSIS — E11.22 TYPE 2 DIABETES MELLITUS WITH STAGE 4 CHRONIC KIDNEY DISEASE AND HYPERTENSION (HCC): ICD-10-CM

## 2021-06-23 DIAGNOSIS — E11.51 DIABETES MELLITUS WITH PERIPHERAL ARTERY DISEASE (HCC): ICD-10-CM

## 2021-06-23 DIAGNOSIS — I48.0 PAF (PAROXYSMAL ATRIAL FIBRILLATION) (HCC): Primary | ICD-10-CM

## 2021-06-23 DIAGNOSIS — N18.4 CKD (CHRONIC KIDNEY DISEASE) STAGE 4, GFR 15-29 ML/MIN (HCC): ICD-10-CM

## 2021-06-23 DIAGNOSIS — I10 ESSENTIAL HYPERTENSION: ICD-10-CM

## 2021-06-23 DIAGNOSIS — I12.9 TYPE 2 DIABETES MELLITUS WITH STAGE 4 CHRONIC KIDNEY DISEASE AND HYPERTENSION (HCC): ICD-10-CM

## 2021-06-23 DIAGNOSIS — N18.4 TYPE 2 DIABETES MELLITUS WITH STAGE 4 CHRONIC KIDNEY DISEASE AND HYPERTENSION (HCC): ICD-10-CM

## 2021-06-23 DIAGNOSIS — I31.3 PERICARDIAL EFFUSION: ICD-10-CM

## 2021-06-23 DIAGNOSIS — I50.22 CHRONIC SYSTOLIC CONGESTIVE HEART FAILURE (HCC): ICD-10-CM

## 2021-06-23 PROCEDURE — 99214 OFFICE O/P EST MOD 30 MIN: CPT | Performed by: INTERNAL MEDICINE

## 2021-06-23 PROCEDURE — 93000 ELECTROCARDIOGRAM COMPLETE: CPT | Performed by: INTERNAL MEDICINE

## 2021-06-23 NOTE — PROGRESS NOTES
Cardiology Followup    Maira Stephens  [id number]  1940      Interval History: Maira Stephens is a 78y o  year old male who is here for followup of CHF and atrial fibrillation  He has been feeling well since his last visit  LE edema has resolved  He uses extra torsemide if edema present  Monitoring salt intake  During his last visit, torsemide was increased to 40 mg daily because of worsening lower extremity edema  He denies any shortness of breath, orthopnea, PND or chest pain  Repeat echocardiogram showed no change in EF - remains 40-45%  Still with moderate-large sized effusion without tamponade  In the past, he was noted to have an irregular heart beat during a visit with Dr Ghanshyam Velasquez  Holter monitor showed the presence of atrial fibrillation and he was started on Eliquis  He had no symptoms at the time  Previously, he was having dyspnea with minimal exertion and was hospitalized in January 2019  While hospitalized, he was noted to have renal failure, CHF with an EF of 40% and frequent PVCs  Stress test was abnormal with a fixed inferolateral wall defect without ischemia  There was a moderate sized pericardial effusion  Past Medical History:   Diagnosis Date    Atrial fibrillation ([facility] )     Chronic kidney disease     Coronary artery disease     Diabetes mellitus ([facility] )     Hyperlipidemia     Hypertension      Social History     Socioeconomic History    Marital status:       Spouse name: Not on file    Number of children: 1    Years of education: 15    Highest education level: 12th grade   Occupational History    Not on file   Tobacco Use    Smoking status: Former Smoker    Smokeless tobacco: Former User   Vaping Use    Vaping Use: Never used   Substance and Sexual Activity    Alcohol use: Not Currently     Comment: spaecial occasions    Drug use: Not Currently    Sexual activity: Not on file   Other Topics Concern    Not on file Social History Narrative    Not on file     Social Determinants of Health     Financial Resource Strain:     Difficulty of Paying Living Expenses:    Food Insecurity:     Worried About Running Out of Food in the Last Year:     [address] in the Last Year:    Transportation Needs:     Lack of Transportation (Medical):      Lack of Transportation (Non-Medical):    Physical Activity:     Days of Exercise per Week:     Minutes of Exercise per Session:    Stress:     Feeling of Stress :    Social Connections:     Frequency of Communication with Friends and Family:     Frequency of Social Gatherings with Friends and Family:     Attends Holiness Services:     Active Member of Clubs or Organizations:     Attends Club or Organization Meetings:     Marital Status:    Intimate Partner Violence:     Fear of Current or Ex-Partner:     Emotionally Abused:     Physically Abused:     Sexually Abused:       Family History   Problem Relation Age of Onset    Heart disease Mother     Diabetes Father     Vision loss Father     Cancer Sister     Diabetes Sister      Past Surgical History:   Procedure Laterality Date    EYE SURGERY      SKIN CANCER EXCISION      TONSILLECTOMY         Current Outpatient Medications:     acetaminophen (TYLENOL) 325 mg tablet, Take 2 tablets (650 mg total) by mouth every 6 (six) hours as needed for mild pain (Patient taking differently: Take 650 mg by mouth every 6 (six) hours as needed for mild pain ), Disp: , Rfl: 0    allopurinol (ZYLOPRIM) 100 mg tablet, Take 100 mg by mouth 2 (two) times a day, Disp: , Rfl:     ALPRAZolam (XANAX) 0 5 mg tablet, 0 5 mg daily at bedtime , Disp: , Rfl: 0    ascorbic acid (VITAMIN C) 500 MG tablet, Take 1 tablet (500 mg total) by mouth daily, Disp: , Rfl: 0    atorvastatin (LIPITOR) 40 mg tablet, Take 1 tablet (40 mg total) by mouth daily with dinner, Disp: 30 tablet, Rfl: 0    Blood Pressure Monitor NILTON, by Does not apply route daily, Disp: 1 Device, Rfl: 0    carvedilol (COREG) 6 25 mg tablet, Take 1 tablet (6 25 mg total) by mouth 2 (two) times a day with meals, Disp: 60 tablet, Rfl: 0    cholecalciferol (VITAMIN D3) 1,000 units tablet, Take 1 tablet (1,000 Units total) by mouth daily, Disp: 60 tablet, Rfl: 3    ELIQUIS 2 5 MG, TAKE ONE TABLET BY MOUTH TWICE A DAY, Disp: 60 tablet, Rfl: 11    glimepiride (AMARYL) 4 mg tablet, Take 4 mg by mouth daily with breakfast , Disp: , Rfl:     halobetasol (ULTRAVATE) 0 05 % cream, Apply 1 application topically daily as needed (Ezcema)  , Disp: , Rfl: 0    isosorbide mononitrate (IMDUR) 30 mg 24 hr tablet, Take 1 tablet (30 mg total) by mouth daily, Disp: 30 tablet, Rfl: 0    Januvia 100 MG tablet, Take 100 mg by mouth daily, Disp: , Rfl:     latanoprost (XALATAN) 0 005 % ophthalmic solution, 1 drop daily at bedtime, Disp: , Rfl:     sitaGLIPtin (JANUVIA) 25 mg tablet, Take 1 tablet (25 mg total) by mouth daily, Disp: 30 tablet, Rfl: 0    timolol (TIMOPTIC) 0 5 % ophthalmic solution, Administer 1 drop to both eyes daily, Disp: , Rfl:     torsemide (DEMADEX) 20 mg tablet, Take 1 tablet (20 mg total) by mouth daily, Disp: 30 tablet, Rfl: 3    amLODIPine (NORVASC) 2 5 mg tablet, Take 2 5 mg by mouth daily (Patient not taking: Reported on 6/23/2021), Disp: , Rfl:     bacitracin topical ointment 500 units/g topical ointment, Apply 1 large application topically 2 (two) times a day (Patient not taking: Reported on 2/11/2021), Disp: 15 g, Rfl: 0  Allergies   Allergen Reactions    Sulfa Antibiotics     Sulfur          Review of Systems:  Review of Systems   Constitutional: Negative for chills and fever  HENT: Negative for ear pain and sore throat  Eyes: Negative for pain and visual disturbance  Respiratory: Negative for cough and shortness of breath  Cardiovascular: Positive for leg swelling  Negative for chest pain and palpitations     Gastrointestinal: Negative for abdominal pain and vomiting  Genitourinary: Negative for dysuria and hematuria  Musculoskeletal: Positive for arthralgias  Negative for back pain  Skin: Negative for color change and rash  Neurological: Negative for seizures and syncope  All other systems reviewed and are negative  Physical Exam:  Vitals:    06/23/21 1015   BP: 124/62   BP Location: Right arm   Patient Position: Sitting   Cuff Size: Standard   Pulse: 84   Temp: 97 5 °F (36 4 °C)   SpO2: 99%   Weight: 72 6 kg (160 lb)   Height: 5' 9" (1 753 m)     Physical Exam  Constitutional:       General: He is not in acute distress  Appearance: He is well-developed  He is not diaphoretic  HENT:      Head: Normocephalic and atraumatic  Eyes:      Conjunctiva/sclera: Conjunctivae normal       Pupils: Pupils are equal, round, and reactive to light  Neck:      Thyroid: No thyromegaly  Vascular: No JVD  Cardiovascular:      Rate and Rhythm: Normal rate  Rhythm irregular  Heart sounds: Murmur heard  No friction rub  No gallop  Pulmonary:      Effort: Pulmonary effort is normal       Breath sounds: Normal breath sounds  Abdominal:      General: There is no distension  Palpations: Abdomen is soft  Tenderness: There is no abdominal tenderness  Musculoskeletal:      Cervical back: Neck supple  Right lower leg: Edema present  Left lower leg: Edema present  Skin:     General: Skin is warm and dry  Findings: No erythema or rash  Neurological:      Mental Status: He is alert and oriented to person, place, and time  Cranial Nerves: No cranial nerve deficit  Psychiatric:         Behavior: Behavior normal          Thought Content: Thought content normal          Judgment: Judgment normal          Labs: reviewed    Imaging: No results found  ECG: Atrial fibrillation at 70 bpm    Discussion/Summary:  1  PAF (paroxysmal atrial fibrillation) ([facility] )    2  Chronic systolic congestive heart failure ([facility] )    3  Essential hypertension    4  CKD (chronic kidney disease) stage 4, GFR 15-29 ml/min ([facility])    5  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 ([facility] )    6  Pericardial effusion    7  Diabetes mellitus with peripheral artery disease ([facility])      - Continue torsemide  Monitor ankles for edema and daily weights  If any swelling develop, he should increase torsemide dose  - Will obtain BMP    - Continue Eliquis 2 5 mg twice a day  - Monitor daily weights  - Following up with nephrology for renal failure  - continue carvedilol 6 25 mg b i d

## 2021-06-30 ENCOUNTER — TELEPHONE (OUTPATIENT)
Dept: NEPHROLOGY | Facility: CLINIC | Age: 81
End: 2021-06-30

## 2021-06-30 DIAGNOSIS — I48.0 PAROXYSMAL ATRIAL FIBRILLATION (HCC): ICD-10-CM

## 2021-06-30 RX ORDER — APIXABAN 2.5 MG/1
TABLET, FILM COATED ORAL
Qty: 60 TABLET | Refills: 11 | Status: SHIPPED | OUTPATIENT
Start: 2021-06-30 | End: 2022-07-15

## 2021-06-30 NOTE — TELEPHONE ENCOUNTER
Patients son called the office , said patient has gone for labs recently for his PCP at [facility]  I called the lab and nothing was available since May 16 which was a hemoglobin check only  I called the PCP office no answer, lm advising to call back and fax over their most recent labs from this month per son

## 2021-08-19 ENCOUNTER — TELEPHONE (OUTPATIENT)
Dept: NEPHROLOGY | Facility: CLINIC | Age: 81
End: 2021-08-19

## 2021-08-20 NOTE — PROGRESS NOTES
NEPHROLOGY OFFICE VISIT   Feliberto Jenkins 78 y o  male MRN: [id number]  8/23/2021    Reason for Visit:  Follow-up    INTERVAL HISTORY and SUBJECTIVE:    I had the pleasure of seeing Gemma Rubin today in the renal clinic for follow-up  He has a history of CKD, CHF, atrial fibrillation Since our last visit, there has been no ER visits or hospitilizations  he has no specific complaints except for lower extremity edema  His primary care physician has been increasing the dose of  torsemide and he is currently on 60 mg twice a day  When he was last seen in the nephrology clinic by Dr Osbaldo Galvan in March she was on 20 mg a day  Lower extremity edema is controlled but not resolved  He is wearing support stockings  His son is with him  The last blood work was done on  08/20/2021, which we have reviewed together  Had COVID 19 in May- mild case  ASSESSMENT AND PLAN:    1  Chronic kidney disease, stage IV:    · Nephrologist:  Dr Osbaldo Galvan  · Etiology:  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 Current creatinine 2 38 mg/dL  Renal function at baseline  ? Previously attended [facility] class  ? Dr Osbaldo Galvan discussed modalities at last office visit  ? No indication for renal replacement therapy at this time  · Recent adjustment in diuretic dose per Cardiology  · Follow-up with Dr Osbaldo Galvan   2  Hypertension:    · Home BP monitoring- doesn't recall Home readings right now   · Goal blood pressure less than 130/80  · On amlodipine 5 mg daily, carvedilol 6 25 mg twice a day, isosorbide mononitrate 30 mg daily     currently on torsemide 60 mg twice a day  · Blood pressure at goal  3  Chronic systolic CHF/atrial fibrillation /volume status /lower extremity edema:    · Ejection fraction 40 to 45 %  Moderate to large size pleural effusion without tamponade being monitored by Cardiology  · Cardiologist:  Dr Funmi Mcintosh  · History of pericardial effusion, PVCs  · Diuretic:  Torsemide 60 mg BID  · Dry weight near 150-155 lb  Per son weight has been near 150 lb  · No evidence of decompensated heart failure  · Chronic lower extremity edema since 2019: Wearing support stockings, leg elevation and low-sodium diet stressed  4  Diabetes mellitus, type 2:   · Target A1c less than 7  · Last A1c 7 1%  5  Electrolytes:    · Potassium acceptable  6  Acid-base:    · Bicarbonate acceptable 28  · Goal to keep bicarbonate level greater than 21  · Continue to monitor  7  Anemia evaluation:    · Hemoglobin within normal limits, 12 8  8  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, vitamin-D  45 6  9   Health maintenance: No colonoscopy       PATIENT INSTRUCTIONS:    Patient Instructions   1  No change in medications at this time  2  Continue daily weight to monitor the effects of the diuretic  3  Follow-up with Dr Osbaldo Galvan in 4 months  4   Follow-up blood work ordered          Orders Placed This Encounter   Procedures    Renal function panel     Standing Status:   Future     Standing Expiration Date:   2/1/2022    Urinalysis with microscopic     Standing Status:   Future     Standing Expiration Date:   2/1/2022    Protein / creatinine ratio, urine     Standing Status:   Future     Standing Expiration Date:   2/1/2022    Magnesium     Standing Status:   Future     Standing Expiration Date:   2/1/2022    CBC     Standing Status:   Future     Standing Expiration Date:   2/1/2022       OBJECTIVE:  Current Weight: Weight - Scale: 73 9 kg (163 lb)  Vitals:    08/23/21 1551 08/23/21 1618   BP:  118/62   BP Location:  Left arm   Patient Position:  Sitting   Cuff Size:  Standard   Pulse:  72   Weight: 73 9 kg (163 lb) Height: 5' 9" (1 753 m)     Body mass index is 24 07 kg/m²  REVIEW OF SYSTEMS:    Review of Systems   Constitutional: Negative for activity change, appetite change, chills, fatigue, fever and unexpected weight change  Eyes: Negative for visual disturbance  Respiratory: Negative for cough and shortness of breath  Cardiovascular: Positive for leg swelling  Negative for chest pain and palpitations  Gastrointestinal: Negative for abdominal pain, blood in stool, constipation, diarrhea, nausea and vomiting  Genitourinary: Positive for urgency  Negative for difficulty urinating, dysuria and hematuria  Musculoskeletal: Positive for arthralgias  Negative for back pain and myalgias  Skin: Negative for color change and rash  Neurological: Negative for dizziness, syncope, weakness and headaches  Hematological: Bruises/bleeds easily  Psychiatric/Behavioral: The patient is not nervous/anxious  PHYSICAL EXAM:      Physical Exam  Constitutional:       General: He is not in acute distress  Appearance: Normal appearance  He is well-developed  He is not toxic-appearing  HENT:      Head: Normocephalic and atraumatic  Nose: No congestion  Mouth/Throat:      Mouth: Mucous membranes are moist       Pharynx: No oropharyngeal exudate or posterior oropharyngeal erythema  Eyes:      General: No scleral icterus  Right eye: No discharge  Left eye: No discharge  Extraocular Movements: Extraocular movements intact  Conjunctiva/sclera: Conjunctivae normal       Pupils: Pupils are equal, round, and reactive to light  Neck:      Thyroid: No thyromegaly  Vascular: No carotid bruit or JVD  Trachea: No tracheal deviation  Cardiovascular:      Rate and Rhythm: Normal rate  Rhythm irregular  Heart sounds: No murmur heard  No friction rub  No gallop  Pulmonary:      Effort: Pulmonary effort is normal  No respiratory distress        Breath sounds: Normal breath sounds  No stridor  No wheezing, rhonchi or rales  Abdominal:      General: Bowel sounds are normal  There is no distension  Palpations: Abdomen is soft  There is no mass  Tenderness: There is no abdominal tenderness  There is no guarding or rebound  Hernia: No hernia is present  Musculoskeletal:         General: Tenderness present  No swelling  Normal range of motion  Cervical back: Normal range of motion and neck supple  No rigidity or tenderness  Right lower le+ Edema present  Left lower le+ Edema present  Skin:     General: Skin is warm and dry  Capillary Refill: Capillary refill takes less than 2 seconds  Coloration: Skin is not jaundiced or pale  Findings: No erythema or rash  Neurological:      Mental Status: He is alert and oriented to person, place, and time  Psychiatric:         Mood and Affect: Mood normal          Behavior: Behavior normal          Thought Content:  Thought content normal          Judgment: Judgment normal          Medications:    Current Outpatient Medications:     acetaminophen (TYLENOL) 325 mg tablet, Take 2 tablets (650 mg total) by mouth every 6 (six) hours as needed for mild pain (Patient taking differently: Take 650 mg by mouth every 6 (six) hours as needed for mild pain ), Disp: , Rfl: 0    allopurinol (ZYLOPRIM) 100 mg tablet, Take 100 mg by mouth 2 (two) times a day, Disp: , Rfl:     ALPRAZolam (XANAX) 0 5 mg tablet, 0 5 mg daily at bedtime , Disp: , Rfl: 0    ascorbic acid (VITAMIN C) 500 MG tablet, Take 1 tablet (500 mg total) by mouth daily, Disp: , Rfl: 0    atorvastatin (LIPITOR) 40 mg tablet, Take 1 tablet (40 mg total) by mouth daily with dinner, Disp: 30 tablet, Rfl: 0    carvedilol (COREG) 6 25 mg tablet, Take 1 tablet (6 25 mg total) by mouth 2 (two) times a day with meals, Disp: 60 tablet, Rfl: 0    cholecalciferol (VITAMIN D3) 1,000 units tablet, Take 1 tablet (1,000 Units total) by mouth daily, Disp: 60 tablet, Rfl: 3    Eliquis 2 5 MG, TAKE ONE TABLET BY MOUTH TWICE A DAY, Disp: 60 tablet, Rfl: 11    glimepiride (AMARYL) 4 mg tablet, Take 4 mg by mouth daily with breakfast , Disp: , Rfl:     isosorbide mononitrate (IMDUR) 30 mg 24 hr tablet, Take 1 tablet (30 mg total) by mouth daily, Disp: 30 tablet, Rfl: 0    Januvia 100 MG tablet, Take 100 mg by mouth daily, Disp: , Rfl:     latanoprost (XALATAN) 0 005 % ophthalmic solution, 1 drop daily at bedtime, Disp: , Rfl:     timolol (TIMOPTIC) 0 5 % ophthalmic solution, Administer 1 drop to both eyes daily, Disp: , Rfl:     torsemide (DEMADEX) 20 mg tablet, Take 1 tablet (20 mg total) by mouth 2 (two) times a day TAKES 3 TABLETS IN THE AM, 3 TABLETS IIN THE PM, Disp: , Rfl:     ZINC OXIDE PO, Take by mouth daily, Disp: , Rfl:     bacitracin topical ointment 500 units/g topical ointment, Apply 1 large application topically 2 (two) times a day (Patient not taking: Reported on 2/11/2021), Disp: 15 g, Rfl: 0    Blood Pressure Monitor NILTON, by Does not apply route daily, Disp: 1 Device, Rfl: 0    halobetasol (ULTRAVATE) 0 05 % cream, Apply 1 application topically daily as needed (Ezcema)  , Disp: , Rfl: 0    sitaGLIPtin (JANUVIA) 25 mg tablet, Take 1 tablet (25 mg total) by mouth daily (Patient not taking: Reported on 8/23/2021), Disp: 30 tablet, Rfl: 0    Laboratory Results:  Results from last 7 days   Lab Units 08/20/21  0000   WBC Thousand/uL 5 50   HEMOGLOBIN g/dL 12 8   HEMATOCRIT % 38 3   PLATELETS Thousands/uL 113*   POTASSIUM  4 6   CHLORIDE  97   CO2 mmol/L 28   BUN  58   CREATININE  2 38   CALCIUM  8 8   PHOSPHORUS  3 7       Results for orders placed or performed in visit on 08/23/21   CBC   Result Value Ref Range    WBC 5 50 Thousand/uL    Hemoglobin 12 8 12 0 - 17 0 g/dL    Hematocrit 38 3 36 5 - 49 3 %    Platelets 052 (A) 635 - 390 Thousands/uL   Comprehensive metabolic panel   Result Value Ref Range    SODIUM 138     POTASSIUM 4 6 CHLORIDE 97     CO2 28 21 - 32 mmol/L    ANION GAP      BUN 58     CREATININE 2 38     Glucose 227     EXTERNAL CALCIUM 8 8     TOTAL PROTEIN 6 4     ALBUMIN 4 0     AST 20     ALT 19     ALK PHOS 159     EXTERNAL TOT   BILIRUBIN 0 8     EXTERNAL EGFR 24    Lipid panel   Result Value Ref Range    Cholesterol 85 50 - 200 mg/dL    Triglycerides 63 150 mg/dL    HDL, Direct 37 (A) 40 mg/dL    LDL Direct 14    Vitamin D 25 hydroxy   Result Value Ref Range    EXTERNAL VITAMIN D,25 45 6    Phosphorus   Result Value Ref Range    EXTERNAL PHOSPHORUS 3 7    PTH, intact   Result Value Ref Range    PTH 67

## 2021-08-23 ENCOUNTER — DOCUMENTATION (OUTPATIENT)
Dept: NEPHROLOGY | Facility: CLINIC | Age: 81
End: 2021-08-23

## 2021-08-23 ENCOUNTER — OFFICE VISIT (OUTPATIENT)
Dept: NEPHROLOGY | Facility: CLINIC | Age: 81
End: 2021-08-23
Payer: MEDICARE

## 2021-08-23 VITALS
BODY MASS INDEX: 24.14 KG/M2 | DIASTOLIC BLOOD PRESSURE: 62 MMHG | HEIGHT: 69 IN | WEIGHT: 163 LBS | HEART RATE: 72 BPM | SYSTOLIC BLOOD PRESSURE: 118 MMHG

## 2021-08-23 DIAGNOSIS — E11.22 TYPE 2 DIABETES MELLITUS WITH STAGE 4 CHRONIC KIDNEY DISEASE AND HYPERTENSION (HCC): ICD-10-CM

## 2021-08-23 DIAGNOSIS — I50.22 CHRONIC SYSTOLIC CONGESTIVE HEART FAILURE (HCC): ICD-10-CM

## 2021-08-23 DIAGNOSIS — I10 ESSENTIAL HYPERTENSION: ICD-10-CM

## 2021-08-23 DIAGNOSIS — R60.0 LEG EDEMA: ICD-10-CM

## 2021-08-23 DIAGNOSIS — N18.4 CKD (CHRONIC KIDNEY DISEASE) STAGE 4, GFR 15-29 ML/MIN (HCC): ICD-10-CM

## 2021-08-23 DIAGNOSIS — N18.4 TYPE 2 DIABETES MELLITUS WITH STAGE 4 CHRONIC KIDNEY DISEASE AND HYPERTENSION (HCC): ICD-10-CM

## 2021-08-23 DIAGNOSIS — I48.91 ATRIAL FIBRILLATION, UNSPECIFIED TYPE (HCC): ICD-10-CM

## 2021-08-23 DIAGNOSIS — E55.9 VITAMIN D DEFICIENCY: Primary | ICD-10-CM

## 2021-08-23 DIAGNOSIS — I12.9 TYPE 2 DIABETES MELLITUS WITH STAGE 4 CHRONIC KIDNEY DISEASE AND HYPERTENSION (HCC): ICD-10-CM

## 2021-08-23 LAB
CHOLEST SERPL-MCNC: 85 MG/DL (ref 50–200)
CO2 SERPL-SCNC: 28 MMOL/L (ref 21–32)
EXT GLUCOSE BLD: 227
EXTERNAL ALBUMIN: 4
EXTERNAL ALK PHOS: 159
EXTERNAL ALT: 19
EXTERNAL AST: 20
EXTERNAL BUN: 58
EXTERNAL CALCIUM: 8.8
EXTERNAL CHLORIDE: 97
EXTERNAL CREATININE: 2.38
EXTERNAL EGFR: 24
EXTERNAL PHOSPHORUS: 3.7
EXTERNAL POTASSIUM: 4.6
EXTERNAL PTH: 67
EXTERNAL SODIUM: 138
EXTERNAL T.BILIRUBIN: 0.8
EXTERNAL TOTAL PROTEIN: 6.4
EXTERNAL VITAMIN D,25: 45.6
HCT VFR BLD AUTO: 38.3 % (ref 36.5–49.3)
HDLC SERPL-MCNC: 37 MG/DL (ref 40–?)
HGB BLD-MCNC: 12.8 G/DL (ref 12–17)
LDLC SERPL DIRECT ASSAY-MCNC: 14 MG/DL
PLATELET # BLD AUTO: 113 THOUSANDS/UL (ref 149–390)
TRIGL SERPL-MCNC: 63 MG/DL (ref ?–150)
WBC # BLD AUTO: 5.5 THOUSAND/UL

## 2021-08-23 PROCEDURE — 99214 OFFICE O/P EST MOD 30 MIN: CPT | Performed by: NURSE PRACTITIONER

## 2021-08-23 RX ORDER — TORSEMIDE 20 MG/1
20 TABLET ORAL 2 TIMES DAILY
Status: ON HOLD
Start: 2021-08-23 | End: 2022-01-09 | Stop reason: SDUPTHER

## 2021-08-23 NOTE — PATIENT INSTRUCTIONS
1  No change in medications at this time  2  Continue daily weight to monitor the effects of the diuretic  3  Follow-up with Dr Lissa Call in 4 months  4   Follow-up blood work ordered

## 2021-08-26 NOTE — LETTER
January 29, 2019     Mari Mark, DO  [address] [address]    Patient: Suraj Jacques   YOB: 1940   Date of Visit: 1/29/2019       Dear Dr Verónica Suazo:    Thank you for referring Russel Person to me for evaluation  Below are my notes for this consultation  If you have questions, please do not hesitate to call me  I look forward to following your patient along with you  Sincerely,        Jenna Nguyen DO        CC: No Recipients  Erickson Ramos DO  1/29/2019  3:32 PM  Sign at close encounter               Cardiology Followup    Suraj Jacques  [id number]  1940  [address] [place] [id number]    Interval HistoryI: Suraj Jacques is a [de-identified]y o  year old male who is here for followup of recent hospitalization for CHF  He was feeling dyspnea with moderate exertion for the past few months that significantly worsened earlier this month  Denies any chest pain  Previously, He was noted to have an irregular rhythm during an exam by Dr Verónica Suazo but denies any palpitations, syncope or near syncope  He had bilateral LE edema for the past few years and was using furosemide daily  He was referred for cardiology evaluation and echocardiogram along with holter monitor were ordered  He developed worsening dyspnea and went to hospital instead  While hospitalized, he was noted to have renal failure, CHF with and EF of 40% and frequent PVCs  Stress test was abnormal with a fixed inferolateral wall defect without ischemia  There was a small to moderate sized pericardial effusion  Since hospital discharge, he has been feeling wall well with a marked improvement of his chronic dyspnea  He denies any chest pain  Lower extremity edema has improved  He denies any palpitations        Past Medical History:   Diagnosis Date    Diabetes mellitus [facility])      Social History     Social History    Marital status:      Spouse name: N/A    Number of children: N/A    Years of education: N/A     Occupational History    Not on file  Social History Main Topics    Smoking status: Former Smoker    Smokeless tobacco: Former User    Alcohol use No    Drug use: No    Sexual activity: Not on file     Other Topics Concern    Not on file     Social History Narrative    No narrative on file      Family History   Problem Relation Age of Onset    Heart disease Mother      History reviewed  No pertinent surgical history      Current Outpatient Prescriptions:     allopurinol (ZYLOPRIM) 100 mg tablet, Take 100 mg by mouth 2 (two) times a day, Disp: , Rfl:     ALPRAZolam (XANAX) 0 5 mg tablet, , Disp: , Rfl: 0    amLODIPine (NORVASC) 5 mg tablet, Take 1 tablet (5 mg total) by mouth daily, Disp: 30 tablet, Rfl: 0    aspirin (ECOTRIN LOW STRENGTH) 81 mg EC tablet, Take 2 tablets (162 mg total) by mouth daily, Disp: 30 tablet, Rfl: 0    atorvastatin (LIPITOR) 40 mg tablet, Take 1 tablet (40 mg total) by mouth daily with dinner, Disp: 30 tablet, Rfl: 0    carvedilol (COREG) 6 25 mg tablet, Take 1 tablet (6 25 mg total) by mouth 2 (two) times a day with meals, Disp: 60 tablet, Rfl: 0    furosemide (LASIX) 40 mg tablet, Take 40 mg by mouth daily  , Disp: , Rfl:     glyBURIDE (DIABETA) 5 mg tablet, Take 5 mg by mouth 2 (two) times a day with meals  , Disp: , Rfl:     halobetasol (ULTRAVATE) 0 05 % cream, Apply 1 application topically daily as needed (Ezcema)  , Disp: , Rfl: 0    isosorbide mononitrate (IMDUR) 30 mg 24 hr tablet, Take 1 tablet (30 mg total) by mouth daily, Disp: 30 tablet, Rfl: 0    latanoprost (XALATAN) 0 005 % ophthalmic solution, 1 drop daily at bedtime, Disp: , Rfl:     metFORMIN (GLUCOPHAGE) 500 mg tablet, Take 500 mg by mouth 2 (two) times a day with meals, Disp: , Rfl:     timolol (TIMOPTIC) 0 5 % ophthalmic solution, Administer 1 drop to both eyes 2 (two) times a day, Disp: , Rfl: 0  Allergies   Allergen Reactions    Sulfa Antibiotics     Sulfur          Review of Systems:  Review of Systems   Constitutional: Negative for chills, fatigue and fever  HENT: Negative for congestion, nosebleeds and postnasal drip  Respiratory: Positive for shortness of breath  Negative for cough and chest tightness  Cardiovascular: Positive for leg swelling  Negative for chest pain and palpitations  Gastrointestinal: Negative for abdominal distention, abdominal pain, diarrhea, nausea and vomiting  Endocrine: Negative for polydipsia, polyphagia and polyuria  Musculoskeletal: Positive for arthralgias  Negative for gait problem and myalgias  Skin: Negative for color change, pallor and rash  Allergic/Immunologic: Negative for environmental allergies, food allergies and immunocompromised state  Neurological: Negative for dizziness, seizures, syncope and light-headedness  Hematological: Negative for adenopathy  Does not bruise/bleed easily  Psychiatric/Behavioral: Negative for dysphoric mood  The patient is not nervous/anxious  Physical Exam:  Vitals:    01/29/19 1343   BP: 150/54   BP Location: Left arm   Patient Position: Sitting   Cuff Size: Standard   Pulse: 64   SpO2: 97%   Weight: 72 1 kg (159 lb)   Height: 5' 9" (1 753 m)     Physical Exam   Constitutional: He is oriented to person, place, and time  He appears well-developed  No distress  HENT:   Head: Normocephalic and atraumatic  Eyes: Pupils are equal, round, and reactive to light  Conjunctivae and EOM are normal    Neck: Neck supple  No JVD present  No thyromegaly present  Cardiovascular: Normal rate  An irregular rhythm present  Exam reveals no gallop and no friction rub  Murmur heard  Pulmonary/Chest: Effort normal and breath sounds normal    Abdominal: Soft  He exhibits no distension  There is no tenderness  Musculoskeletal: He exhibits edema  Neurological: He is alert and oriented to person, place, and time  No cranial nerve deficit  Skin: Skin is warm and dry  No rash noted  He is not diaphoretic  No erythema  Chronic venostasis changes   Psychiatric: He has a normal mood and affect  His behavior is normal  Judgment and thought content normal        Labs: reviewed    Imaging: No results found  EKG (independently reviewed): NSR with PVCs    Discussion/Summary:  1  Chronic systolic congestive heart failure (HCC) - EF was 40% on recent echocardiogram   2  Essential hypertension  - BP elevated today  3  PVC (premature ventricular contraction) - may be the source for cardiomyopathy   4  Pericardial effusion - moderate on last echocardiogram will need followup   5  Stage 3 chronic kidney disease (HCC) - BMP ordered for this week   6   Controlled type 2 diabetes mellitus without complication, without long-term current use of insulin ([facility])      - Will repeat BMP  - Echocardiogram next month  - 24 hour holter to quantify amount of PVCs and adjust beta blocker if necessary  - Cannot use ACE inhbitor or ARB due to renal insufficiency Minoxidil Counseling: Minoxidil is a topical medication which can increase blood flow where it is applied. It is uncertain how this medication increases hair growth. Side effects are uncommon and include stinging and allergic reactions.

## 2021-09-08 ENCOUNTER — OFFICE VISIT (OUTPATIENT)
Dept: CARDIOLOGY CLINIC | Facility: CLINIC | Age: 81
End: 2021-09-08
Payer: MEDICARE

## 2021-09-08 VITALS
BODY MASS INDEX: 24.01 KG/M2 | WEIGHT: 162.1 LBS | DIASTOLIC BLOOD PRESSURE: 76 MMHG | TEMPERATURE: 98.1 F | HEIGHT: 69 IN | HEART RATE: 75 BPM | SYSTOLIC BLOOD PRESSURE: 138 MMHG | OXYGEN SATURATION: 99 %

## 2021-09-08 DIAGNOSIS — I10 ESSENTIAL HYPERTENSION: ICD-10-CM

## 2021-09-08 DIAGNOSIS — I48.0 PAROXYSMAL ATRIAL FIBRILLATION (HCC): Primary | ICD-10-CM

## 2021-09-08 DIAGNOSIS — N18.4 CKD (CHRONIC KIDNEY DISEASE) STAGE 4, GFR 15-29 ML/MIN (HCC): ICD-10-CM

## 2021-09-08 DIAGNOSIS — I31.3 PERICARDIAL EFFUSION: ICD-10-CM

## 2021-09-08 DIAGNOSIS — I50.22 CHRONIC SYSTOLIC CONGESTIVE HEART FAILURE (HCC): ICD-10-CM

## 2021-09-08 DIAGNOSIS — I49.3 PVC (PREMATURE VENTRICULAR CONTRACTION): ICD-10-CM

## 2021-09-08 PROCEDURE — 99214 OFFICE O/P EST MOD 30 MIN: CPT | Performed by: INTERNAL MEDICINE

## 2021-09-08 NOTE — PROGRESS NOTES
Cardiology Followup    Elda Fish  [id number]  1940      Interval History: Elda Fish is a 78y o  year old male who is here for followup of CHF and atrial fibrillation  He has been feeling well since his last visit  He has LE edema again  Monitors weight daily  Weight had increased to 156 pounds on home scale because of increased salt intake  Continues to use torsemide 60 mg bid  He uses extra torsemide if edema present  He denies any shortness of breath, orthopnea, PND or chest pain  Repeat echocardiogram showed no change in EF - remains 40-45%  He has persistent moderate-large sized effusion without tamponade  In the past, he was noted to have an irregular heart beat during a visit with Dr Ulisses Javed  Holter monitor showed the presence of atrial fibrillation and he was started on Eliquis  He had no symptoms at the time  Previously, he was having dyspnea with minimal exertion and was hospitalized in January 2019  While hospitalized, he was noted to have renal failure, CHF with an EF of 40% and frequent PVCs  Stress test was abnormal with a fixed inferolateral wall defect without ischemia  There was a moderate sized pericardial effusion  Past Medical History:   Diagnosis Date    [facility]rial fibrillation ([facility] )     Chronic kidney disease     Coronary artery disease     Diabetes mellitus ([facility] )     Hyperlipidemia     Hypertension      Social History     Socioeconomic History    Marital status:       Spouse name: Not on file    Number of children: 1    Years of education: 15    Highest education level: 12th grade   Occupational History    Not on file   Tobacco Use    Smoking status: Former Smoker    Smokeless tobacco: Former User   Vaping Use    Vaping Use: Never used   Substance and Sexual Activity    Alcohol use: Not Currently     Comment: spaecial occasions    Drug use: Not Currently    Sexual activity: Not on file   Other Topics Concern  Not on file   Social History Narrative    Not on file     Social Determinants of Health     Financial Resource Strain:     Difficulty of Paying Living Expenses:    Food Insecurity:     Worried About Running Out of Food in the Last Year:     [address] in the Last Year:    Transportation Needs:     Lack of Transportation (Medical):      Lack of Transportation (Non-Medical):    Physical Activity:     Days of Exercise per Week:     Minutes of Exercise per Session:    Stress:     Feeling of Stress :    Social Connections:     Frequency of Communication with Friends and Family:     Frequency of Social Gatherings with Friends and Family:     Attends [employer] Services:     Active Member of Clubs or Organizations:     Attends Club or Organization Meetings:     Marital Status:    Intimate Partner Violence:     Fear of Current or Ex-Partner:     Emotionally Abused:     Physically Abused:     Sexually Abused:       Family History   Problem Relation Age of Onset    Heart disease Mother     Diabetes Father     Vision loss Father     Cancer Sister     Diabetes Sister      Past Surgical History:   Procedure Laterality Date    EYE SURGERY      SKIN CANCER EXCISION      TONSILLECTOMY         Current Outpatient Medications:     acetaminophen (TYLENOL) 325 mg tablet, Take 2 tablets (650 mg total) by mouth every 6 (six) hours as needed for mild pain (Patient taking differently: Take 650 mg by mouth every 6 (six) hours as needed for mild pain ), Disp: , Rfl: 0    allopurinol (ZYLOPRIM) 100 mg tablet, Take 100 mg by mouth 2 (two) times a day, Disp: , Rfl:     ALPRAZolam (XANAX) 0 5 mg tablet, 0 5 mg daily at bedtime , Disp: , Rfl: 0    ascorbic acid (VITAMIN C) 500 MG tablet, Take 1 tablet (500 mg total) by mouth daily, Disp: , Rfl: 0    atorvastatin (LIPITOR) 40 mg tablet, Take 1 tablet (40 mg total) by mouth daily with dinner, Disp: 30 tablet, Rfl: 0    Blood Pressure Monitor NILTON, by Does not apply route daily, Disp: 1 Device, Rfl: 0    carvedilol (COREG) 6 25 mg tablet, Take 1 tablet (6 25 mg total) by mouth 2 (two) times a day with meals, Disp: 60 tablet, Rfl: 0    cholecalciferol (VITAMIN D3) 1,000 units tablet, Take 1 tablet (1,000 Units total) by mouth daily, Disp: 60 tablet, Rfl: 3    Eliquis 2 5 MG, TAKE ONE TABLET BY MOUTH TWICE A DAY, Disp: 60 tablet, Rfl: 11    glimepiride (AMARYL) 4 mg tablet, Take 4 mg by mouth daily with breakfast , Disp: , Rfl:     halobetasol (ULTRAVATE) 0 05 % cream, Apply 1 application topically daily as needed (Ezcema)  , Disp: , Rfl: 0    isosorbide mononitrate (IMDUR) 30 mg 24 hr tablet, Take 1 tablet (30 mg total) by mouth daily, Disp: 30 tablet, Rfl: 0    Januvia 100 MG tablet, Take 100 mg by mouth daily, Disp: , Rfl:     latanoprost (XALATAN) 0 005 % ophthalmic solution, 1 drop daily at bedtime, Disp: , Rfl:     timolol (TIMOPTIC) 0 5 % ophthalmic solution, Administer 1 drop to both eyes daily, Disp: , Rfl:     torsemide (DEMADEX) 20 mg tablet, Take 1 tablet (20 mg total) by mouth 2 (two) times a day TAKES 3 TABLETS IN THE AM, 3 TABLETS IIN THE PM, Disp: , Rfl:     ZINC OXIDE PO, Take by mouth daily, Disp: , Rfl:     bacitracin topical ointment 500 units/g topical ointment, Apply 1 large application topically 2 (two) times a day (Patient not taking: Reported on 2/11/2021), Disp: 15 g, Rfl: 0    sitaGLIPtin (JANUVIA) 25 mg tablet, Take 1 tablet (25 mg total) by mouth daily (Patient not taking: Reported on 8/23/2021), Disp: 30 tablet, Rfl: 0  Allergies   Allergen Reactions    Sulfa Antibiotics     Sulfur          Review of Systems:  Review of Systems   Respiratory: Negative for cough and shortness of breath  Cardiovascular: Positive for leg swelling  Negative for chest pain and palpitations  Musculoskeletal: Positive for arthralgias  Neurological: Negative for syncope and light-headedness     All other systems reviewed and are negative  Physical Exam:  Vitals:    09/08/21 1337   BP: 138/76   BP Location: Right arm   Patient Position: Sitting   Cuff Size: Standard   Pulse: 75   Temp: 98 1 °F (36 7 °C)   TempSrc: Temporal   SpO2: 99%   Weight: 73 5 kg (162 lb 1 6 oz)   Height: 5' 9" (1 753 m)     Physical Exam  Constitutional:       General: He is not in acute distress  Appearance: He is well-developed  He is not diaphoretic  HENT:      Head: Normocephalic and atraumatic  Eyes:      Conjunctiva/sclera: Conjunctivae normal       Pupils: Pupils are equal, round, and reactive to light  Neck:      Thyroid: No thyromegaly  Vascular: No JVD  Cardiovascular:      Rate and Rhythm: Normal rate  Rhythm irregular  Heart sounds: Murmur heard  No friction rub  No gallop  Pulmonary:      Effort: Pulmonary effort is normal       Breath sounds: Normal breath sounds  Musculoskeletal:      Cervical back: Neck supple  Right lower leg: Edema present  Left lower leg: Edema present  Skin:     General: Skin is warm and dry  Findings: No erythema or rash  Neurological:      General: No focal deficit present  Mental Status: He is alert and oriented to person, place, and time  Mental status is at baseline  Cranial Nerves: No cranial nerve deficit  Psychiatric:         Mood and Affect: Mood normal          Behavior: Behavior normal          Thought Content: Thought content normal          Judgment: Judgment normal          Labs: reviewed    Imaging: No results found  ECG: Atrial fibrillation at 70 bpm    Discussion/Summary:  1  Paroxysmal atrial fibrillation ([facility])    2  Chronic systolic congestive heart failure ([facility] )    3  Essential hypertension    4  CKD (chronic kidney disease) stage 4, GFR 15-29 ml/min ([facility])    5  Pericardial effusion    6  PVC (premature ventricular contraction)      - Continue torsemide 60 mg bid    Monitor ankles for edema and daily weights      - Continue Eliquis 2 5 mg twice a day  - Monitor daily weights  - Following up with nephrology for renal failure  - continue carvedilol 6 25 mg b i d

## 2021-12-01 ENCOUNTER — OFFICE VISIT (OUTPATIENT)
Dept: NEPHROLOGY | Facility: CLINIC | Age: 81
End: 2021-12-01
Payer: MEDICARE

## 2021-12-01 VITALS
SYSTOLIC BLOOD PRESSURE: 130 MMHG | HEIGHT: 69 IN | WEIGHT: 168 LBS | DIASTOLIC BLOOD PRESSURE: 58 MMHG | BODY MASS INDEX: 24.88 KG/M2

## 2021-12-01 DIAGNOSIS — N18.4 CKD (CHRONIC KIDNEY DISEASE) STAGE 4, GFR 15-29 ML/MIN (HCC): ICD-10-CM

## 2021-12-01 DIAGNOSIS — I12.9 TYPE 2 DIABETES MELLITUS WITH STAGE 4 CHRONIC KIDNEY DISEASE AND HYPERTENSION (HCC): Primary | ICD-10-CM

## 2021-12-01 DIAGNOSIS — E55.9 VITAMIN D DEFICIENCY: ICD-10-CM

## 2021-12-01 DIAGNOSIS — I50.22 CHRONIC SYSTOLIC CONGESTIVE HEART FAILURE (HCC): ICD-10-CM

## 2021-12-01 DIAGNOSIS — I10 ESSENTIAL HYPERTENSION: ICD-10-CM

## 2021-12-01 DIAGNOSIS — N18.4 TYPE 2 DIABETES MELLITUS WITH STAGE 4 CHRONIC KIDNEY DISEASE AND HYPERTENSION (HCC): Primary | ICD-10-CM

## 2021-12-01 DIAGNOSIS — E11.22 TYPE 2 DIABETES MELLITUS WITH STAGE 4 CHRONIC KIDNEY DISEASE AND HYPERTENSION (HCC): Primary | ICD-10-CM

## 2021-12-01 PROCEDURE — 99214 OFFICE O/P EST MOD 30 MIN: CPT | Performed by: INTERNAL MEDICINE

## 2022-01-07 ENCOUNTER — HOSPITAL ENCOUNTER (INPATIENT)
Facility: HOSPITAL | Age: 82
LOS: 2 days | Discharge: HOME | DRG: 682 | End: 2022-01-09
Attending: EMERGENCY MEDICINE | Admitting: INTERNAL MEDICINE
Payer: MEDICARE

## 2022-01-07 ENCOUNTER — APPOINTMENT (EMERGENCY)
Dept: RADIOLOGY | Facility: HOSPITAL | Age: 82
DRG: 682 | End: 2022-01-07
Payer: MEDICARE

## 2022-01-07 DIAGNOSIS — R42 LIGHTHEADEDNESS: Primary | ICD-10-CM

## 2022-01-07 DIAGNOSIS — N18.9 ACUTE KIDNEY INJURY SUPERIMPOSED ON CHRONIC KIDNEY DISEASE (HCC): ICD-10-CM

## 2022-01-07 DIAGNOSIS — N19 UREMIA: ICD-10-CM

## 2022-01-07 DIAGNOSIS — E86.0 DEHYDRATION: ICD-10-CM

## 2022-01-07 DIAGNOSIS — I50.22 CHRONIC SYSTOLIC CONGESTIVE HEART FAILURE (HCC): ICD-10-CM

## 2022-01-07 DIAGNOSIS — N19 RENAL FAILURE: ICD-10-CM

## 2022-01-07 DIAGNOSIS — N17.9 ACUTE KIDNEY INJURY SUPERIMPOSED ON CHRONIC KIDNEY DISEASE (HCC): ICD-10-CM

## 2022-01-07 PROBLEM — E87.8 ELECTROLYTE ABNORMALITY: Status: ACTIVE | Noted: 2022-01-07

## 2022-01-07 PROBLEM — U07.1 COVID-19: Status: ACTIVE | Noted: 2022-01-07

## 2022-01-07 LAB
ALBUMIN SERPL BCP-MCNC: 3.6 G/DL (ref 3.5–5)
ALP SERPL-CCNC: 145 U/L (ref 46–116)
ALT SERPL W P-5'-P-CCNC: 43 U/L (ref 12–78)
ANION GAP SERPL CALCULATED.3IONS-SCNC: 13 MMOL/L (ref 4–13)
APTT PPP: 43 SECONDS (ref 23–37)
AST SERPL W P-5'-P-CCNC: 21 U/L (ref 5–45)
BACTERIA UR QL AUTO: NORMAL /HPF
BASOPHILS # BLD AUTO: 0.02 THOUSANDS/ΜL (ref 0–0.1)
BASOPHILS NFR BLD AUTO: 0 % (ref 0–1)
BILIRUB SERPL-MCNC: 0.88 MG/DL (ref 0.2–1)
BILIRUB UR QL STRIP: NEGATIVE
BUN SERPL-MCNC: 121 MG/DL (ref 5–25)
CALCIUM SERPL-MCNC: 8.4 MG/DL (ref 8.3–10.1)
CHLORIDE SERPL-SCNC: 89 MMOL/L (ref 100–108)
CLARITY UR: ABNORMAL
CO2 SERPL-SCNC: 24 MMOL/L (ref 21–32)
COLOR UR: YELLOW
CREAT SERPL-MCNC: 4.3 MG/DL (ref 0.6–1.3)
EOSINOPHIL # BLD AUTO: 0.06 THOUSAND/ΜL (ref 0–0.61)
EOSINOPHIL NFR BLD AUTO: 1 % (ref 0–6)
ERYTHROCYTE [DISTWIDTH] IN BLOOD BY AUTOMATED COUNT: 14.7 % (ref 11.6–15.1)
FLUAV RNA RESP QL NAA+PROBE: NEGATIVE
FLUBV RNA RESP QL NAA+PROBE: NEGATIVE
GFR SERPL CREATININE-BSD FRML MDRD: 11 ML/MIN/1.73SQ M
GLUCOSE SERPL-MCNC: 236 MG/DL (ref 65–140)
GLUCOSE UR STRIP-MCNC: ABNORMAL MG/DL
HCT VFR BLD AUTO: 43.1 % (ref 36.5–49.3)
HGB BLD-MCNC: 14.2 G/DL (ref 12–17)
HGB UR QL STRIP.AUTO: ABNORMAL
IMM GRANULOCYTES # BLD AUTO: 0.07 THOUSAND/UL (ref 0–0.2)
IMM GRANULOCYTES NFR BLD AUTO: 1 % (ref 0–2)
INR PPP: 1.45 (ref 0.84–1.19)
KETONES UR STRIP-MCNC: NEGATIVE MG/DL
LEUKOCYTE ESTERASE UR QL STRIP: ABNORMAL
LYMPHOCYTES # BLD AUTO: 0.62 THOUSANDS/ΜL (ref 0.6–4.47)
LYMPHOCYTES NFR BLD AUTO: 8 % (ref 14–44)
MAGNESIUM SERPL-MCNC: 2.3 MG/DL (ref 1.6–2.6)
MCH RBC QN AUTO: 33.4 PG (ref 26.8–34.3)
MCHC RBC AUTO-ENTMCNC: 32.9 G/DL (ref 31.4–37.4)
MCV RBC AUTO: 101 FL (ref 82–98)
MONOCYTES # BLD AUTO: 0.88 THOUSAND/ΜL (ref 0.17–1.22)
MONOCYTES NFR BLD AUTO: 11 % (ref 4–12)
NEUTROPHILS # BLD AUTO: 6.34 THOUSANDS/ΜL (ref 1.85–7.62)
NEUTS SEG NFR BLD AUTO: 79 % (ref 43–75)
NITRITE UR QL STRIP: NEGATIVE
NON-SQ EPI CELLS URNS QL MICRO: NORMAL /HPF
NRBC BLD AUTO-RTO: 0 /100 WBCS
PH UR STRIP.AUTO: 5.5 [PH]
PLATELET # BLD AUTO: 117 THOUSANDS/UL (ref 149–390)
PMV BLD AUTO: 10 FL (ref 8.9–12.7)
POTASSIUM SERPL-SCNC: 5.7 MMOL/L (ref 3.5–5.3)
PROT SERPL-MCNC: 7.6 G/DL (ref 6.4–8.2)
PROT UR STRIP-MCNC: NEGATIVE MG/DL
PROTHROMBIN TIME: 17.3 SECONDS (ref 11.6–14.5)
RBC # BLD AUTO: 4.25 MILLION/UL (ref 3.88–5.62)
RBC #/AREA URNS AUTO: NORMAL /HPF
RSV RNA RESP QL NAA+PROBE: NEGATIVE
SARS-COV-2 RNA RESP QL NAA+PROBE: POSITIVE
SODIUM SERPL-SCNC: 126 MMOL/L (ref 136–145)
SP GR UR STRIP.AUTO: 1.01 (ref 1–1.03)
UROBILINOGEN UR QL STRIP.AUTO: 0.2 E.U./DL
WBC # BLD AUTO: 7.99 THOUSAND/UL (ref 4.31–10.16)
WBC #/AREA URNS AUTO: NORMAL /HPF

## 2022-01-07 PROCEDURE — 99285 EMERGENCY DEPT VISIT HI MDM: CPT | Performed by: EMERGENCY MEDICINE

## 2022-01-07 PROCEDURE — 99223 1ST HOSP IP/OBS HIGH 75: CPT | Performed by: INTERNAL MEDICINE

## 2022-01-07 PROCEDURE — 93005 ELECTROCARDIOGRAM TRACING: CPT

## 2022-01-07 PROCEDURE — 96361 HYDRATE IV INFUSION ADD-ON: CPT

## 2022-01-07 PROCEDURE — 0241U HB NFCT DS VIR RESP RNA 4 TRGT: CPT | Performed by: EMERGENCY MEDICINE

## 2022-01-07 PROCEDURE — 85025 COMPLETE CBC W/AUTO DIFF WBC: CPT | Performed by: EMERGENCY MEDICINE

## 2022-01-07 PROCEDURE — 85610 PROTHROMBIN TIME: CPT | Performed by: EMERGENCY MEDICINE

## 2022-01-07 PROCEDURE — 96365 THER/PROPH/DIAG IV INF INIT: CPT

## 2022-01-07 PROCEDURE — 99285 EMERGENCY DEPT VISIT HI MDM: CPT

## 2022-01-07 PROCEDURE — 82948 REAGENT STRIP/BLOOD GLUCOSE: CPT

## 2022-01-07 PROCEDURE — 81001 URINALYSIS AUTO W/SCOPE: CPT | Performed by: EMERGENCY MEDICINE

## 2022-01-07 PROCEDURE — 96375 TX/PRO/DX INJ NEW DRUG ADDON: CPT

## 2022-01-07 PROCEDURE — 83735 ASSAY OF MAGNESIUM: CPT | Performed by: EMERGENCY MEDICINE

## 2022-01-07 PROCEDURE — 1124F ACP DISCUSS-NO DSCNMKR DOCD: CPT | Performed by: EMERGENCY MEDICINE

## 2022-01-07 PROCEDURE — 36415 COLL VENOUS BLD VENIPUNCTURE: CPT | Performed by: EMERGENCY MEDICINE

## 2022-01-07 PROCEDURE — 71045 X-RAY EXAM CHEST 1 VIEW: CPT

## 2022-01-07 PROCEDURE — 80053 COMPREHEN METABOLIC PANEL: CPT | Performed by: EMERGENCY MEDICINE

## 2022-01-07 PROCEDURE — 85730 THROMBOPLASTIN TIME PARTIAL: CPT | Performed by: EMERGENCY MEDICINE

## 2022-01-07 RX ORDER — SODIUM CHLORIDE 9 MG/ML
75 INJECTION, SOLUTION INTRAVENOUS CONTINUOUS
Status: DISCONTINUED | OUTPATIENT
Start: 2022-01-07 | End: 2022-01-09 | Stop reason: HOSPADM

## 2022-01-07 RX ORDER — ASCORBIC ACID 500 MG
500 TABLET ORAL DAILY
Status: DISCONTINUED | OUTPATIENT
Start: 2022-01-08 | End: 2022-01-09 | Stop reason: HOSPADM

## 2022-01-07 RX ORDER — ALLOPURINOL 100 MG/1
100 TABLET ORAL 2 TIMES DAILY
Status: DISCONTINUED | OUTPATIENT
Start: 2022-01-08 | End: 2022-01-09 | Stop reason: HOSPADM

## 2022-01-07 RX ORDER — MELATONIN
1000 DAILY
Status: DISCONTINUED | OUTPATIENT
Start: 2022-01-08 | End: 2022-01-09 | Stop reason: HOSPADM

## 2022-01-07 RX ORDER — CALCIUM GLUCONATE 20 MG/ML
1 INJECTION, SOLUTION INTRAVENOUS ONCE
Status: COMPLETED | OUTPATIENT
Start: 2022-01-07 | End: 2022-01-07

## 2022-01-07 RX ORDER — ISOSORBIDE MONONITRATE 30 MG/1
30 TABLET, EXTENDED RELEASE ORAL DAILY
Status: DISCONTINUED | OUTPATIENT
Start: 2022-01-08 | End: 2022-01-09 | Stop reason: HOSPADM

## 2022-01-07 RX ORDER — ONDANSETRON 2 MG/ML
4 INJECTION INTRAMUSCULAR; INTRAVENOUS EVERY 6 HOURS PRN
Status: DISCONTINUED | OUTPATIENT
Start: 2022-01-07 | End: 2022-01-09 | Stop reason: HOSPADM

## 2022-01-07 RX ORDER — ACETAMINOPHEN 325 MG/1
650 TABLET ORAL EVERY 6 HOURS PRN
Status: DISCONTINUED | OUTPATIENT
Start: 2022-01-07 | End: 2022-01-09 | Stop reason: HOSPADM

## 2022-01-07 RX ORDER — ATORVASTATIN CALCIUM 40 MG/1
40 TABLET, FILM COATED ORAL
Status: DISCONTINUED | OUTPATIENT
Start: 2022-01-08 | End: 2022-01-09 | Stop reason: HOSPADM

## 2022-01-07 RX ORDER — TIMOLOL MALEATE 5 MG/ML
1 SOLUTION/ DROPS OPHTHALMIC DAILY
Status: DISCONTINUED | OUTPATIENT
Start: 2022-01-08 | End: 2022-01-09 | Stop reason: HOSPADM

## 2022-01-07 RX ORDER — LATANOPROST 50 UG/ML
1 SOLUTION/ DROPS OPHTHALMIC
Status: DISCONTINUED | OUTPATIENT
Start: 2022-01-07 | End: 2022-01-09 | Stop reason: HOSPADM

## 2022-01-07 RX ORDER — CARVEDILOL 6.25 MG/1
6.25 TABLET ORAL 2 TIMES DAILY WITH MEALS
Status: DISCONTINUED | OUTPATIENT
Start: 2022-01-08 | End: 2022-01-09 | Stop reason: HOSPADM

## 2022-01-07 RX ORDER — DEXTROSE MONOHYDRATE 25 G/50ML
50 INJECTION, SOLUTION INTRAVENOUS ONCE
Status: COMPLETED | OUTPATIENT
Start: 2022-01-07 | End: 2022-01-07

## 2022-01-07 RX ORDER — ALPRAZOLAM 0.5 MG/1
0.5 TABLET ORAL
Status: DISCONTINUED | OUTPATIENT
Start: 2022-01-07 | End: 2022-01-09 | Stop reason: HOSPADM

## 2022-01-07 RX ADMIN — DEXTROSE MONOHYDRATE 50 ML: 500 INJECTION PARENTERAL at 16:00

## 2022-01-07 RX ADMIN — SODIUM CHLORIDE 500 ML: 0.9 INJECTION, SOLUTION INTRAVENOUS at 15:55

## 2022-01-07 RX ADMIN — CALCIUM GLUCONATE 1 G: 20 INJECTION, SOLUTION INTRAVENOUS at 16:04

## 2022-01-07 RX ADMIN — INSULIN HUMAN 5 UNITS: 100 INJECTION, SOLUTION PARENTERAL at 16:01

## 2022-01-07 RX ADMIN — SODIUM CHLORIDE 75 ML/HR: 0.9 INJECTION, SOLUTION INTRAVENOUS at 23:11

## 2022-01-07 RX ADMIN — SODIUM CHLORIDE 500 ML: 0.9 INJECTION, SOLUTION INTRAVENOUS at 14:44

## 2022-01-07 RX ADMIN — LATANOPROST 1 DROP: 50 SOLUTION OPHTHALMIC at 23:11

## 2022-01-07 NOTE — ASSESSMENT & PLAN NOTE
Patient has history of paroxysmal atrial fibrillation  Continue Coreg 6 25 milligram p o  B i d   And anticoagulation with Eliquis

## 2022-01-07 NOTE — ASSESSMENT & PLAN NOTE
Lab Results   Component Value Date    EGFR 11 01/07/2022    EGFR 24 08/20/2021    EGFR 28 02/04/2021    CREATININE 4 30 (H) 01/07/2022    CREATININE 2 38 08/20/2021    CREATININE 2 16 02/04/2021   Patient has history of chronic kidney disease stage 4 with baseline creatinine around 2 5-3  Patient's creatinine is 4 3 upon admission  Likely secondary dehydration and increased dose of diuretics  No overt uremic symptoms of fluid overload currently  No urgent indication for renal replacement therapy  Gentle IV hydration with normal saline at 75 mL/hour  Avoid nephrotoxic agents and hypotension  Hold torsemide  Follow-up BMP in a m

## 2022-01-07 NOTE — ASSESSMENT & PLAN NOTE
Lab Results   Component Value Date    HGBA1C 7 1 02/04/2021    HGBA1C 7 1 02/04/2021       No results for input(s): POCGLU in the last 72 hours  Blood Sugar Average: Last 72 hrs:   patient is on Januvia and Amaryl at home which will be held  Patient will be covered with Humalog sliding scale with Accu-Cheks q a c   And HS  Continue diabetic diet

## 2022-01-07 NOTE — ED PROVIDER NOTES
History  Chief Complaint   Patient presents with    Dizziness     having dizziness x 3 days and sent by dr office for BUN and creat labs but has chronic kidney problems, also c/o mid upper back pain when dizziness started     78 male presents with lightheadedness for the past few days dehydrated no syncope chest pain noted  Complaining of shortness of breath and cough  Had some upper back pain, diffuse body aches noted  Has not been vaccinated against COVID  Did get COVID last year  History provided by:  Patient   used: No        Prior to Admission Medications   Prescriptions Last Dose Informant Patient Reported? Taking?    ALPRAZolam (XANAX) 0 5 mg tablet Past Week at Unknown time Child Yes Yes   Si 5 mg daily at bedtime    Blood Pressure Monitor NILTON  Child No No   Sig: by Does not apply route daily   Eliquis 2 5 MG Not Taking at Unknown time Child No No   Sig: TAKE ONE TABLET BY MOUTH TWICE A DAY   Patient not taking: Reported on 2022   Januvia 100 MG tablet Not Taking at Unknown time Child Yes No   Sig: Take 100 mg by mouth daily   Patient not taking: Reported on 2022    ZINC OXIDE PO Past Week at Unknown time  Yes Yes   Sig: Take by mouth daily   acetaminophen (TYLENOL) 325 mg tablet  Child No No   Sig: Take 2 tablets (650 mg total) by mouth every 6 (six) hours as needed for mild pain   Patient taking differently: Take 650 mg by mouth every 6 (six) hours as needed for mild pain    allopurinol (ZYLOPRIM) 100 mg tablet Past Week at Unknown time Child Yes Yes   Sig: Take 100 mg by mouth 2 (two) times a day   ascorbic acid (VITAMIN C) 500 MG tablet Past Week at Unknown time Child No Yes   Sig: Take 1 tablet (500 mg total) by mouth daily   atorvastatin (LIPITOR) 40 mg tablet Past Week at Unknown time Child No Yes   Sig: Take 1 tablet (40 mg total) by mouth daily with dinner   bacitracin topical ointment 500 units/g topical ointment Unknown at Unknown time Child No No   Sig: Apply 1 large application topically 2 (two) times a day   Patient not taking: Reported on 2021   carvedilol (COREG) 6 25 mg tablet Past Week at Unknown time Child No Yes   Sig: Take 1 tablet (6 25 mg total) by mouth 2 (two) times a day with meals   cholecalciferol (VITAMIN D3) 1,000 units tablet Past Week at Unknown time Child No Yes   Sig: Take 1 tablet (1,000 Units total) by mouth daily   glimepiride (AMARYL) 4 mg tablet Not Taking at Unknown time Child Yes No   Sig: Take 4 mg by mouth daily with breakfast    Patient not taking: Reported on 2022    halobetasol (ULTRAVATE) 0 05 % cream Not Taking at Unknown time Child Yes No   Sig: Apply 1 application topically daily as needed (Ezcema)     Patient not taking: Reported on 2022    isosorbide mononitrate (IMDUR) 30 mg 24 hr tablet Not Taking at Unknown time Child No No   Sig: Take 1 tablet (30 mg total) by mouth daily   Patient not taking: Reported on 2022    latanoprost (XALATAN) 0 005 % ophthalmic solution 2022 at Unknown time Child Yes Yes   Si drop daily at bedtime   sitaGLIPtin (JANUVIA) 25 mg tablet Not Taking at Unknown time Child No No   Sig: Take 1 tablet (25 mg total) by mouth daily   Patient not taking: Reported on 2021   timolol (TIMOPTIC) 0 5 % ophthalmic solution 2022 at Unknown time Child No Yes   Sig: Administer 1 drop to both eyes daily   torsemide (DEMADEX) 20 mg tablet   No No   Sig: Take 1 tablet (20 mg total) by mouth 2 (two) times a day TAKES 3 TABLETS IN THE AM, 3 TABLETS IIN THE PM      Facility-Administered Medications: None       Past Medical History:   Diagnosis Date    Atrial fibrillation (HCC)     Chronic kidney disease     Coronary artery disease     Diabetes mellitus ([address] )     Hyperlipidemia     Hypertension        Past Surgical History:   Procedure Laterality Date    EYE SURGERY      SKIN CANCER EXCISION      TONSILLECTOMY         Family History   Problem Relation Age of Onset    Heart disease Mother     Diabetes Father     Vision loss Father     Cancer Sister     Diabetes Sister      I have reviewed and agree with the history as documented  E-Cigarette/Vaping    E-Cigarette Use Never User      E-Cigarette/Vaping Substances    Nicotine No     THC No     CBD No     Flavoring No     Other No     Unknown No      Social History     Tobacco Use    Smoking status: Former Smoker    Smokeless tobacco: Former User   Vaping Use    Vaping Use: Never used   Substance Use Topics    Alcohol use: Not Currently     Alcohol/week: 0 0 standard drinks     Comment: special occasions    Drug use: Not Currently       Review of Systems   Constitutional: Negative  HENT: Negative  Eyes: Negative  Respiratory: Positive for cough and shortness of breath  Cardiovascular: Negative  Gastrointestinal: Negative  Endocrine: Negative  Genitourinary: Negative  Musculoskeletal: Negative  Skin: Negative  Allergic/Immunologic: Negative  Neurological: Positive for weakness and light-headedness  Hematological: Negative  Psychiatric/Behavioral: Negative  All other systems reviewed and are negative  Physical Exam  Physical Exam  Constitutional:       Appearance: Normal appearance  He is ill-appearing  HENT:      Head: Normocephalic and atraumatic  Comments: Dry mucus membrane     Nose: Nose normal       Mouth/Throat:      Mouth: Mucous membranes are moist    Eyes:      Extraocular Movements: Extraocular movements intact  Pupils: Pupils are equal, round, and reactive to light  Cardiovascular:      Rate and Rhythm: Normal rate and regular rhythm  Pulmonary:      Effort: Pulmonary effort is normal       Breath sounds: Normal breath sounds  Abdominal:      General: Abdomen is flat  Bowel sounds are normal       Palpations: Abdomen is soft  Musculoskeletal:         General: Normal range of motion  Cervical back: Normal range of motion and neck supple     Skin: General: Skin is warm  Capillary Refill: Capillary refill takes less than 2 seconds  Neurological:      General: No focal deficit present  Mental Status: He is alert and oriented to person, place, and time  Mental status is at baseline  Cranial Nerves: No cranial nerve deficit  Sensory: No sensory deficit  Motor: No weakness  Coordination: Coordination normal       Gait: Gait normal       Deep Tendon Reflexes: Reflexes normal    Psychiatric:         Mood and Affect: Mood normal          Thought Content:  Thought content normal          Vital Signs  ED Triage Vitals   Temperature Pulse Respirations Blood Pressure SpO2   01/07/22 1249 01/07/22 1249 01/07/22 1249 01/07/22 1249 01/07/22 1249   (!) 97 1 °F (36 2 °C) 85 18 (!) 84/49 99 %      Temp Source Heart Rate Source Patient Position - Orthostatic VS BP Location FiO2 (%)   01/08/22 0737 01/07/22 1426 01/07/22 1515 01/07/22 1249 --   Oral Monitor Lying Right arm       Pain Score       01/07/22 1249       5           Vitals:    01/08/22 1958 01/08/22 2307 01/09/22 0828 01/09/22 1406   BP: 115/61 115/58 108/68 125/69   Pulse:  78 73 74   Patient Position - Orthostatic VS:  Lying Lying Lying         Visual Acuity      ED Medications  Medications   sodium chloride 0 9 % bolus 500 mL (0 mL Intravenous Stopped 1/7/22 1600)   calcium gluconate 1 g in sodium chloride 0 9% 50 mL (premix) (0 g Intravenous Stopped 1/7/22 1819)   insulin regular (HumuLIN R,NovoLIN R) injection 5 Units (5 Units Intravenous Given 1/7/22 1601)   dextrose 50 % IV solution 50 mL (50 mL Intravenous Given 1/7/22 1600)   sodium chloride 0 9 % bolus 500 mL (0 mL Intravenous Stopped 1/7/22 1819)       Diagnostic Studies  Results Reviewed     Procedure Component Value Units Date/Time    Fingerstick Glucose (POCT) [[id number]]  (Abnormal) Collected: 01/07/22 1433    Lab Status: Final result Updated: 01/08/22 1439     POC Glucose 236 mg/dl     COVID/FLU/RSV - 2 hour TAT [[id number]]  (Abnormal) Collected: 01/07/22 1446    Lab Status: Final result Specimen: Nares from Nasopharyngeal Swab Updated: 01/07/22 1537     SARS-CoV-2 Positive     INFLUENZA A PCR Negative     INFLUENZA B PCR Negative     RSV PCR Negative    Narrative:      FOR PEDIATRIC PATIENTS - copy/paste COVID Guidelines URL to browser: [email or web address]    SARS-CoV-2 assay is a Nucleic Acid Amplification assay intended for the  qualitative detection of nucleic acid from SARS-CoV-2 in nasopharyngeal  swabs  Results are for the presumptive identification of SARS-CoV-2 RNA  Positive results are indicative of infection with SARS-CoV-2, the virus  causing COVID-19, but do not rule out bacterial infection or co-infection  with other viruses  Laboratories within the [place] and its  territories are required to report all positive results to the appropriate  public health authorities  Negative results do not preclude SARS-CoV-2  infection and should not be used as the sole basis for treatment or other  patient management decisions  Negative results must be combined with  clinical observations, patient history, and epidemiological information  This test has not been FDA cleared or approved  This test has been authorized by FDA under an Emergency Use Authorization  (EUA)  This test is only authorized for the duration of time the  declaration that circumstances exist justifying the authorization of the  emergency use of an in vitro diagnostic tests for detection of SARS-CoV-2  virus and/or diagnosis of COVID-19 infection under section 564(b)(1) of  the Act, 21 U  S C  974PGY-0(C)(8), unless the authorization is terminated  or revoked sooner  The test has been validated but independent review by FDA  and CLIA is pending  Test performed using [employer]: This RT-PCR assay targets N2,  a region unique to SARS-CoV-2   A conserved region in the E-gene was chosen  for pan-Sarbecovirus detection which includes SARS-CoV-2      Comprehensive metabolic panel [[id number]]  (Abnormal) Collected: 01/07/22 1446    Lab Status: Final result Specimen: Blood from Arm, Right Updated: 01/07/22 1527     Sodium 126 mmol/L      Potassium 5 7 mmol/L      Chloride 89 mmol/L      CO2 24 mmol/L      ANION GAP 13 mmol/L       mg/dL      Creatinine 4 30 mg/dL      Glucose 236 mg/dL      Calcium 8 4 mg/dL      AST 21 U/L      ALT 43 U/L      Alkaline Phosphatase 145 U/L      Total Protein 7 6 g/dL      Albumin 3 6 g/dL      Total Bilirubin 0 88 mg/dL      eGFR 11 ml/min/1 73sq m     Narrative:      [place] guidelines for Chronic Kidney Disease (CKD):     Stage 1 with normal or high GFR (GFR > 90 mL/min/1 73 square meters)    Stage 2 Mild CKD (GFR = 60-89 mL/min/1 73 square meters)    Stage 3A Moderate CKD (GFR = 45-59 mL/min/1 73 square meters)    Stage 3B Moderate CKD (GFR = 30-44 mL/min/1 73 square meters)    Stage 4 Severe CKD (GFR = 15-29 mL/min/1 73 square meters)    Stage 5 End Stage CKD (GFR <15 mL/min/1 73 square meters)  Note: GFR calculation is accurate only with a steady state creatinine    Magnesium [[id number]]  (Normal) Collected: 01/07/22 1446    Lab Status: Final result Specimen: Blood from Arm, Right Updated: 01/07/22 1515     Magnesium 2 3 mg/dL     Protime-INR [[id number]]  (Abnormal) Collected: 01/07/22 1446    Lab Status: Final result Specimen: Blood from Arm, Right Updated: 01/07/22 1513     Protime 17 3 seconds      INR 1 45    APTT [[id number]]  (Abnormal) Collected: 01/07/22 1446    Lab Status: Final result Specimen: Blood from Arm, Right Updated: 01/07/22 1513     PTT 43 seconds     Urine Microscopic [[id number]]  (Normal) Collected: 01/07/22 1446    Lab Status: Final result Specimen: Urine, Clean Catch Updated: 01/07/22 1513     RBC, UA 1-2 /hpf      WBC, UA 1-2 /hpf      Epithelial Cells Occasional /hpf      Bacteria, UA None Seen /hpf     UA (URINE) with reflex to Scope [[id number]]  (Abnormal) Collected: 01/07/22 1446    Lab Status: Final result Specimen: Urine, Clean Catch Updated: 01/07/22 1459     Color, UA Yellow     Clarity, UA Slightly Cloudy     Specific Gravity, UA 1 015     pH, UA 5 5     Leukocytes, UA Moderate     Nitrite, UA Negative     Protein, UA Negative mg/dl      Glucose,  (1/10%) mg/dl      Ketones, UA Negative mg/dl      Urobilinogen, UA 0 2 E U /dl      Bilirubin, UA Negative     Blood, UA Trace-Intact    CBC and differential [[id number]]  (Abnormal) Collected: 01/07/22 1446    Lab Status: Final result Specimen: Blood from Arm, Right Updated: 01/07/22 1459     WBC 7 99 Thousand/uL      RBC 4 25 Million/uL      Hemoglobin 14 2 g/dL      Hematocrit 43 1 %       fL      MCH 33 4 pg      MCHC 32 9 g/dL      RDW 14 7 %      MPV 10 0 fL      Platelets 182 Thousands/uL      nRBC 0 /100 WBCs      Neutrophils Relative 79 %      Immat GRANS % 1 %      Lymphocytes Relative 8 %      Monocytes Relative 11 %      Eosinophils Relative 1 %      Basophils Relative 0 %      Neutrophils Absolute 6 34 Thousands/µL      Immature Grans Absolute 0 07 Thousand/uL      Lymphocytes Absolute 0 62 Thousands/µL      Monocytes Absolute 0 88 Thousand/µL      Eosinophils Absolute 0 06 Thousand/µL      Basophils Absolute 0 02 Thousands/µL                  XR chest 1 view portable   Final Result by Barbara Arana MD (01/07 1542)      Mild right basilar airspace disease suspicious for pneumonia  Small right pleural effusion           Workstation performed: [id number]                    Procedures  Procedures         ED Course                                             MDM  Number of Diagnoses or Management Options     Amount and/or Complexity of Data Reviewed  Clinical lab tests: ordered and reviewed  Tests in the radiology section of CPT®: ordered and reviewed  Tests in the medicine section of CPT®: reviewed and ordered    Patient Progress  Patient progress: stable      Disposition  Final diagnoses:   Lightheadedness   Renal failure   Dehydration   Uremia     Time reflects when diagnosis was documented in both MDM as applicable and the Disposition within this note     Time User Action Codes Description Comment    1/7/2022  4:28 PM Anepu, Colette Add [R42] Lightheadedness     1/7/2022  4:28 PM Anepu, Brayan Kaden Add [N19] Renal failure     1/7/2022  4:28 PM Anepu, Colette Add [E86 0] Dehydration     1/7/2022  4:28 PM Anepu, Colette Add [N19] Uremia     1/9/2022 12:08 PM ChichiliSamsonya Add [I10 41] Chronic systolic congestive heart failure ([facility] [address] )     1/9/2022 12:25 PM Rocio Landa Modify [B97 83] Chronic systolic congestive heart failure ([facility] )     1/9/2022 12:35 PM ChichiliPetewarya Add [N17 9,  N18 9] Acute kidney injury superimposed on chronic kidney disease [facility])       ED Disposition     ED Disposition Condition Date/Time Comment    Admit Stable Fri Jan 7, 2022  4:28 PM          Follow-up Information     Follow up With Specialties Details Why Contact Vianca Daniels DO Family Medicine Follow up in 1 week(s)  [place] [address] Gera Wiley MD Nephrology Follow up in 2 week(s)  [address] [place] [address]            Discharge Medication List as of 1/9/2022 12:52 PM      CONTINUE these medications which have CHANGED    Details   torsemide (DEMADEX) 20 mg tablet Take 1 tablet (20 mg total) by mouth daily, Starting Sun 1/9/2022, No Print         CONTINUE these medications which have NOT CHANGED    Details   allopurinol (ZYLOPRIM) 100 mg tablet Take 100 mg by mouth 2 (two) times a day, Historical Med      ALPRAZolam (XANAX) 0 5 mg tablet 0 5 mg daily at bedtime , Starting Mon 3/12/2018, Historical Med      ascorbic acid (VITAMIN C) 500 MG tablet Take 1 tablet (500 mg total) by mouth daily, Starting Fri 1/8/2021, No Print atorvastatin (LIPITOR) 40 mg tablet Take 1 tablet (40 mg total) by mouth daily with dinner, Starting Wed 1/16/2019, Print      carvedilol (COREG) 6 25 mg tablet Take 1 tablet (6 25 mg total) by mouth 2 (two) times a day with meals, Starting Wed 1/16/2019, Print      cholecalciferol (VITAMIN D3) 1,000 units tablet Take 1 tablet (1,000 Units total) by mouth daily, Starting Fri 10/18/2019, Normal      latanoprost (XALATAN) 0 005 % ophthalmic solution 1 drop daily at bedtime, Historical Med      timolol (TIMOPTIC) 0 5 % ophthalmic solution Administer 1 drop to both eyes daily, Starting Thu 1/7/2021, No Print      ZINC OXIDE PO Take by mouth daily, Historical Med      acetaminophen (TYLENOL) 325 mg tablet Take 2 tablets (650 mg total) by mouth every 6 (six) hours as needed for mild pain, Starting Thu 1/7/2021, No Print      Blood Pressure Monitor NILTON by Does not apply route daily, Starting Wed 2/19/2020, Normal      Eliquis 2 5 MG TAKE ONE TABLET BY MOUTH TWICE A DAY, Normal      isosorbide mononitrate (IMDUR) 30 mg 24 hr tablet Take 1 tablet (30 mg total) by mouth daily, Starting Thu 1/17/2019, Print         STOP taking these medications       bacitracin topical ointment 500 units/g topical ointment Comments:   Reason for Stopping:         glimepiride (AMARYL) 4 mg tablet Comments:   Reason for Stopping:         halobetasol (ULTRAVATE) 0 05 % cream Comments:   Reason for Stopping:         Januvia 100 MG tablet Comments:   Reason for Stopping:         sitaGLIPtin (JANUVIA) 25 mg tablet Comments:   Reason for Stopping:               Outpatient Discharge Orders   Basic metabolic panel   Standing Status: Future Standing Exp   Date: 01/09/23     Discharge Diet     Activity as tolerated       PDMP Review     None          ED Provider  Electronically Signed by           Areli Cavazos DO  01/10/22 5805

## 2022-01-07 NOTE — ASSESSMENT & PLAN NOTE
Wt Readings from Last 3 Encounters:   01/07/22 76 2 kg (168 lb)   12/01/21 76 2 kg (168 lb)   09/08/21 73 5 kg (162 lb 1 6 oz)       No clinical evidence of fluid overload  Hold torsemide  Echo showed EF of 40-45% with grade 2 diastolic dysfunction with moderate concentric hypertrophy

## 2022-01-07 NOTE — ASSESSMENT & PLAN NOTE
Patient was tested positive for COVID-19 seen in the ED  Patient's O2 saturation remains high 90s on room air  Chest x-ray showed mild right basilar airspace disease suspicious for pneumonia with small right pleural effusion  Monitor respiratory status  Hold off on any treatment at the current time

## 2022-01-07 NOTE — ASSESSMENT & PLAN NOTE
Continue Coreg 6 25 milligram p o  B i d   Hold torsemide in the setting of acute kidney injury and dizziness

## 2022-01-07 NOTE — ASSESSMENT & PLAN NOTE
Sodium level was 128 and potassium level was 5 7 in the ED  Likely secondary to diuretic use and hypovolemia  EKG showed acute ST-T changes  Patient was given insulin with D50 and calcium gluconate in ED  IV hydration with follow-up BMP in [facility]

## 2022-01-07 NOTE — H&P
Nguyen 43  H&P- Suraj Peat 1940, 78 y o  male MRN: [id number]  Unit/Bed#: ED 16 Encounter: [id number]  Primary Care Provider: Mari Mark DO   Date and time admitted to hospital: 1/7/2022  2:14 PM    * Acute kidney injury superimposed on chronic kidney disease [facility])  Assessment & Plan  Lab Results   Component Value Date    EGFR 11 01/07/2022    EGFR 24 08/20/2021    EGFR 28 02/04/2021    CREATININE 4 30 (H) 01/07/2022    CREATININE 2 38 08/20/2021    CREATININE 2 16 02/04/2021   Patient has history of chronic kidney disease stage 4 with baseline creatinine around 2 5-3  Patient's creatinine is 4 3 upon admission  Likely secondary dehydration and increased dose of diuretics  No overt uremic symptoms of fluid overload currently  No urgent indication for renal replacement therapy  Gentle IV hydration with normal saline at 75 mL/hour  Avoid nephrotoxic agents and hypotension  Hold torsemide  Follow-up BMP in a m  Electrolyte abnormality  Assessment & Plan  Sodium level was 128 and potassium level was 5 7 in the ED  Likely secondary to diuretic use and hypovolemia  EKG showed acute ST-T changes  Patient was given insulin with D50 and calcium gluconate in ED  IV hydration with follow-up BMP in a m  COVID-19  Assessment & Plan  Patient was tested positive for COVID-19 seen in the ED  Patient's O2 saturation remains high 90s on room air  Chest x-ray showed mild right basilar airspace disease suspicious for pneumonia with small right pleural effusion  Monitor respiratory status  Hold off on any treatment at the current time    Atrial fibrillation [facility])  Assessment & Plan  Patient has history of paroxysmal atrial fibrillation  Continue Coreg 6 25 milligram p o  B i d   And anticoagulation with Eliquis    Chronic systolic congestive heart failure [facility])  Assessment & Plan  Wt Readings from Last 3 Encounters:   01/07/22 76 2 kg (168 lb)   12/01/21 76 2 kg (168 lb)   09/08/21 73 5 kg (162 lb 1 6 oz)       No clinical evidence of fluid overload  Hold torsemide  Echo showed EF of 40-45% with grade 2 diastolic dysfunction with moderate concentric hypertrophy    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 ([facility] [address] )  Assessment & Plan  Lab Results   Component Value Date    HGBA1C 7 1 02/04/2021    HGBA1C 7 1 02/04/2021       No results for input(s): POCGLU in the last 72 hours  Blood Sugar Average: Last 72 hrs:   patient is on Januvia and Amaryl at home which will be held  Patient will be covered with Humalog sliding scale with Accu-Cheks q a c  And HS  Continue diabetic diet    Essential hypertension  Assessment & Plan  Continue Coreg 6 25 milligram p o  B i d   Hold torsemide in the setting of acute kidney injury and dizziness      VTE Pharmacologic Prophylaxis:   High Risk (Score >/= 5) - Pharmacological DVT Prophylaxis Ordered: apixaban (Eliquis)  Sequential Compression Devices Ordered  Code Status:  Full code  Discussion with family: yes    Anticipated Length of Stay: Patient will be admitted on an inpatient basis with an anticipated length of stay of greater than 2 midnights secondary to Acute kidney injury, electrolyte abnormalities and COVID  Total Time for Visit, including Counseling / Coordination of Care: 60 minutes Greater than 50% of this total time spent on direct patient counseling and coordination of care  Chief Complaint:  Dizziness and low blood pressure    History of Present Illness:  Maira Stephens is a 78 y o  male with a PMH of chronic heart failure, CKD stage 5, hypertension, PAF, diabetes mellitus type 2 who presents with dizziness for 3 days  Patient was also seen in the doctor's office today and was sent in because of elevated BUN creatinine  Patient otherwise denies any chest pain, shortness of breath, cough, headache, abdominal pain, nausea or vomiting    In the ED, patient's blood pressure was initially low at 80 4/49 which later improved  Labs showed a sodium level of 126, potassium level of 5 7 and BUN creatinine of 121 and 4 3     Review of Systems:  Review of Systems   Constitutional: Negative for chills, diaphoresis, fatigue and unexpected weight change  HENT: Negative for congestion, ear discharge, ear pain, facial swelling, hearing loss, mouth sores, nosebleeds, postnasal drip, rhinorrhea, sinus pressure, sneezing, sore throat, tinnitus, trouble swallowing and voice change  Eyes: Negative for photophobia, discharge, redness and visual disturbance  Respiratory: Negative for cough, chest tightness, shortness of breath, wheezing and stridor  Cardiovascular: Negative for chest pain, palpitations and leg swelling  Gastrointestinal: Negative for abdominal distention, abdominal pain, anal bleeding, blood in stool, constipation, diarrhea, nausea and vomiting  Endocrine: Negative for polydipsia, polyphagia and polyuria  Genitourinary: Negative for decreased urine volume, difficulty urinating, dysuria, flank pain, frequency, hematuria and urgency  Musculoskeletal: Negative for arthralgias, back pain and neck stiffness  Skin: Negative for pallor and rash  Neurological: Positive for dizziness  Negative for seizures, facial asymmetry, speech difficulty, light-headedness, numbness and headaches  Hematological: Negative for adenopathy  Does not bruise/bleed easily  Psychiatric/Behavioral: Negative for agitation and confusion  Past Medical and Surgical History:   Past Medical History:   Diagnosis Date    [facility] fibrillation ([facility] )     Chronic kidney disease     Coronary artery disease     Diabetes mellitus ([facility] )     Hyperlipidemia     Hypertension        Past Surgical History:   Procedure Laterality Date    EYE SURGERY      SKIN CANCER EXCISION      TONSILLECTOMY         Meds/Allergies:  Prior to Admission medications    Medication Sig Start Date End Date Taking?  Authorizing Provider   acetaminophen (TYLENOL) 325 mg tablet Take 2 tablets (650 mg total) by mouth every 6 (six) hours as needed for mild pain  Patient taking differently: Take 650 mg by mouth every 6 (six) hours as needed for mild pain  1/7/21   Bonnie Villagomez MD   allopurinol (ZYLOPRIM) 100 mg tablet Take 100 mg by mouth 2 (two) times a day    Historical Provider, MD   ALPRAZolam [place]) 0 5 mg tablet 0 5 mg daily at bedtime  3/12/18   Historical Provider, MD   ascorbic acid (VITAMIN C) 500 MG tablet Take 1 tablet (500 mg total) by mouth daily 1/8/21   Bonnie Villagomez MD   atorvastatin (LIPITOR) 40 mg tablet Take 1 tablet (40 mg total) by mouth daily with dinner 1/16/19   Phoebe Perez DO   bacitracin topical ointment 500 units/g topical ointment Apply 1 large application topically 2 (two) times a day  Patient not taking: Reported on 2/11/2021 1/7/21   Bonnie Villagomez MD   Blood Pressure Monitor NILTON by Does not apply route daily 2/19/20   Cayla Deng MD   carvedilol (COREG) 6 25 mg tablet Take 1 tablet (6 25 mg total) by mouth 2 (two) times a day with meals 1/16/19   Phoebe Perez DO   cholecalciferol (VITAMIN D3) 1,000 units tablet Take 1 tablet (1,000 Units total) by mouth daily 10/18/19   Cayla Deng MD   Eliquis 2 5 MG TAKE ONE TABLET BY MOUTH TWICE A DAY 6/30/21   Erickson Ramos DO   glimepiride (AMARYL) 4 mg tablet Take 4 mg by mouth daily with breakfast     Historical Provider, MD   halobetasol (ULTRAVATE) 0 05 % cream Apply 1 application topically daily as needed Geofm Stairs)   11/1/18   Historical Provider, MD   isosorbide mononitrate (IMDUR) 30 mg 24 hr tablet Take 1 tablet (30 mg total) by mouth daily 1/17/19   Phoebe Perez DO   Januvia 100 MG tablet Take 100 mg by mouth daily 1/31/21   Historical Provider, MD   latanoprost (XALATAN) 0 005 % ophthalmic solution 1 drop daily at bedtime    Historical Provider, MD   sitaGLIPtin (JANUVIA) 25 mg tablet Take 1 tablet (25 mg total) by mouth daily  Patient not taking: Reported on 8/23/2021 1/7/21 Jarad Dutton MD   timolol (TIMOPTIC) 0 5 % ophthalmic solution Administer 1 drop to both eyes daily 1/7/21   Jarad Dutton MD   torsemide (DEMADEX) 20 mg tablet Take 1 tablet (20 mg total) by mouth 2 (two) times a day TAKES 3 TABLETS IN THE AM, 3 TABLETS IIN THE PM 8/23/21   SUSANA Tesfaye   ZINC OXIDE PO Take by mouth daily    Historical Provider, MD     I have reviewed home medications using recent Epic encounter  Allergies: Allergies   Allergen Reactions    Sulfa Antibiotics     Sulfur        Social History:  Marital Status:      Substance Use History:   Social History     Substance and Sexual Activity   Alcohol Use Not Currently    Comment: spaecial occasions     Social History     Tobacco Use   Smoking Status Former Smoker   Smokeless Tobacco Former User     Social History     Substance and Sexual Activity   Drug Use Not Currently       Family History:  Family History   Problem Relation Age of Onset    Heart disease Mother     Diabetes Father     Vision loss Father     Cancer Sister     Diabetes Sister        Physical Exam:     Vitals:   Blood Pressure: 113/58 (01/07/22 1645)  Pulse: 76 (01/07/22 1645)  Temperature: (!) 97 1 °F (36 2 °C) (01/07/22 1249)  Respirations: 20 (01/07/22 1645)  Height: 5' 9" (175 3 cm) (01/07/22 1249)  Weight - Scale: 76 2 kg (168 lb) (01/07/22 1249)  SpO2: 100 % (01/07/22 1645)    Physical Exam  Constitutional:       Appearance: Normal appearance  HENT:      Head: Normocephalic and atraumatic  Eyes:      Extraocular Movements: Extraocular movements intact  Pupils: Pupils are equal, round, and reactive to light  Cardiovascular:      Rate and Rhythm: Normal rate  Rhythm irregular  Heart sounds: No murmur heard  No gallop  Pulmonary:      Effort: Pulmonary effort is normal       Breath sounds: Normal breath sounds  Abdominal:      General: Bowel sounds are normal       Palpations: Abdomen is soft  Tenderness:  There is no abdominal tenderness  Musculoskeletal:         General: No swelling or deformity  Normal range of motion  Cervical back: Normal range of motion and neck supple  Skin:     General: Skin is warm and dry  Neurological:      General: No focal deficit present  Mental Status: He is alert  Additional Data:     Lab Results:  Results from last 7 days   Lab Units 01/07/22  1446   WBC Thousand/uL 7 99   HEMOGLOBIN g/dL 14 2   HEMATOCRIT % 43 1   PLATELETS Thousands/uL 117*   NEUTROS PCT % 79*   LYMPHS PCT % 8*   MONOS PCT % 11   EOS PCT % 1     Results from last 7 days   Lab Units 01/07/22  1446   SODIUM mmol/L 126*   POTASSIUM mmol/L 5 7*   CHLORIDE mmol/L 89*   CO2 mmol/L 24   BUN mg/dL 121*   CREATININE mg/dL 4 30*   ANION GAP mmol/L 13   CALCIUM mg/dL 8 4   ALBUMIN g/dL 3 6   TOTAL BILIRUBIN mg/dL 0 88   ALK PHOS U/L 145*   ALT U/L 43   AST U/L 21   GLUCOSE RANDOM mg/dL 236*     Results from last 7 days   Lab Units 01/07/22  1446   INR  1 45*                   Imaging: Reviewed radiology reports from this admission including: chest xray  XR chest 1 view portable   Final Result by Kathy Martin MD (01/07 1652)      Mild right basilar airspace disease suspicious for pneumonia  Small right pleural effusion  Workstation performed: [id number]             EKG and Other Studies Reviewed on Admission:   · EKG: Atrial fibrillation  HR 79 with nonspecific T-waves in inferolateral leads  ** Please Note: This note has been constructed using a voice recognition system   **

## 2022-01-08 LAB
ANION GAP SERPL CALCULATED.3IONS-SCNC: 12 MMOL/L (ref 4–13)
BASOPHILS # BLD AUTO: 0.02 THOUSANDS/ΜL (ref 0–0.1)
BASOPHILS NFR BLD AUTO: 0 % (ref 0–1)
BUN SERPL-MCNC: 114 MG/DL (ref 5–25)
CALCIUM SERPL-MCNC: 7.9 MG/DL (ref 8.3–10.1)
CHLORIDE SERPL-SCNC: 97 MMOL/L (ref 100–108)
CO2 SERPL-SCNC: 23 MMOL/L (ref 21–32)
CREAT SERPL-MCNC: 3.75 MG/DL (ref 0.6–1.3)
EOSINOPHIL # BLD AUTO: 0.14 THOUSAND/ΜL (ref 0–0.61)
EOSINOPHIL NFR BLD AUTO: 2 % (ref 0–6)
ERYTHROCYTE [DISTWIDTH] IN BLOOD BY AUTOMATED COUNT: 14.6 % (ref 11.6–15.1)
GFR SERPL CREATININE-BSD FRML MDRD: 14 ML/MIN/1.73SQ M
GLUCOSE SERPL-MCNC: 140 MG/DL (ref 65–140)
GLUCOSE SERPL-MCNC: 195 MG/DL (ref 65–140)
GLUCOSE SERPL-MCNC: 200 MG/DL (ref 65–140)
GLUCOSE SERPL-MCNC: 236 MG/DL (ref 65–140)
GLUCOSE SERPL-MCNC: 75 MG/DL (ref 65–140)
GLUCOSE SERPL-MCNC: 81 MG/DL (ref 65–140)
GLUCOSE SERPL-MCNC: 98 MG/DL (ref 65–140)
HCT VFR BLD AUTO: 41.1 % (ref 36.5–49.3)
HGB BLD-MCNC: 13.6 G/DL (ref 12–17)
IMM GRANULOCYTES # BLD AUTO: 0.04 THOUSAND/UL (ref 0–0.2)
IMM GRANULOCYTES NFR BLD AUTO: 1 % (ref 0–2)
LYMPHOCYTES # BLD AUTO: 0.88 THOUSANDS/ΜL (ref 0.6–4.47)
LYMPHOCYTES NFR BLD AUTO: 12 % (ref 14–44)
MCH RBC QN AUTO: 33.7 PG (ref 26.8–34.3)
MCHC RBC AUTO-ENTMCNC: 33.1 G/DL (ref 31.4–37.4)
MCV RBC AUTO: 102 FL (ref 82–98)
MONOCYTES # BLD AUTO: 0.95 THOUSAND/ΜL (ref 0.17–1.22)
MONOCYTES NFR BLD AUTO: 13 % (ref 4–12)
NEUTROPHILS # BLD AUTO: 5.41 THOUSANDS/ΜL (ref 1.85–7.62)
NEUTS SEG NFR BLD AUTO: 72 % (ref 43–75)
NRBC BLD AUTO-RTO: 0 /100 WBCS
PLATELET # BLD AUTO: 112 THOUSANDS/UL (ref 149–390)
PMV BLD AUTO: 9.6 FL (ref 8.9–12.7)
POTASSIUM SERPL-SCNC: 4.6 MMOL/L (ref 3.5–5.3)
PROCALCITONIN SERPL-MCNC: 0.26 NG/ML
RBC # BLD AUTO: 4.03 MILLION/UL (ref 3.88–5.62)
SODIUM SERPL-SCNC: 132 MMOL/L (ref 136–145)
WBC # BLD AUTO: 7.44 THOUSAND/UL (ref 4.31–10.16)

## 2022-01-08 PROCEDURE — 99232 SBSQ HOSP IP/OBS MODERATE 35: CPT | Performed by: INTERNAL MEDICINE

## 2022-01-08 PROCEDURE — 82948 REAGENT STRIP/BLOOD GLUCOSE: CPT

## 2022-01-08 PROCEDURE — 85025 COMPLETE CBC W/AUTO DIFF WBC: CPT | Performed by: INTERNAL MEDICINE

## 2022-01-08 PROCEDURE — 84145 PROCALCITONIN (PCT): CPT | Performed by: INTERNAL MEDICINE

## 2022-01-08 PROCEDURE — 80048 BASIC METABOLIC PNL TOTAL CA: CPT | Performed by: INTERNAL MEDICINE

## 2022-01-08 RX ADMIN — ALLOPURINOL 100 MG: 100 TABLET ORAL at 17:22

## 2022-01-08 RX ADMIN — ISOSORBIDE MONONITRATE 30 MG: 30 TABLET, EXTENDED RELEASE ORAL at 09:53

## 2022-01-08 RX ADMIN — APIXABAN 2.5 MG: 2.5 TABLET, FILM COATED ORAL at 09:53

## 2022-01-08 RX ADMIN — CARVEDILOL 6.25 MG: 6.25 TABLET, FILM COATED ORAL at 17:22

## 2022-01-08 RX ADMIN — Medication 1000 UNITS: at 09:53

## 2022-01-08 RX ADMIN — ATORVASTATIN CALCIUM 40 MG: 40 TABLET, FILM COATED ORAL at 17:22

## 2022-01-08 RX ADMIN — TIMOLOL MALEATE 1 DROP: 5 SOLUTION/ DROPS OPHTHALMIC at 09:53

## 2022-01-08 RX ADMIN — LATANOPROST 1 DROP: 50 SOLUTION OPHTHALMIC at 22:57

## 2022-01-08 RX ADMIN — OXYCODONE HYDROCHLORIDE AND ACETAMINOPHEN 500 MG: 500 TABLET ORAL at 09:53

## 2022-01-08 RX ADMIN — INSULIN LISPRO 2 UNITS: 100 INJECTION, SOLUTION INTRAVENOUS; SUBCUTANEOUS at 12:01

## 2022-01-08 RX ADMIN — INSULIN LISPRO 2 UNITS: 100 INJECTION, SOLUTION INTRAVENOUS; SUBCUTANEOUS at 17:22

## 2022-01-08 RX ADMIN — CARVEDILOL 6.25 MG: 6.25 TABLET, FILM COATED ORAL at 09:53

## 2022-01-08 RX ADMIN — ALLOPURINOL 100 MG: 100 TABLET ORAL at 09:53

## 2022-01-08 RX ADMIN — APIXABAN 2.5 MG: 2.5 TABLET, FILM COATED ORAL at 17:22

## 2022-01-08 NOTE — PLAN OF CARE
Problem: CARDIOVASCULAR - ADULT  Goal: Maintains optimal cardiac output and hemodynamic stability  Description: INTERVENTIONS:  - Monitor I/O, vital signs and rhythm  - Monitor for S/S and trends of decreased cardiac output  - Administer and titrate ordered vasoactive medications to optimize hemodynamic stability  - Assess quality of pulses, skin color and temperature  - Assess for signs of decreased coronary artery perfusion  - Instruct patient to report change in severity of symptoms  Outcome: Progressing  Goal: Absence of cardiac dysrhythmias or at baseline rhythm  Description: INTERVENTIONS:  - Continuous cardiac monitoring, vital signs, obtain 12 lead EKG if ordered  - Administer antiarrhythmic and heart rate control medications as ordered  - Monitor electrolytes and administer replacement therapy as ordered  Outcome: Progressing     Problem: RESPIRATORY - ADULT  Goal: Achieves optimal ventilation and oxygenation  Description: INTERVENTIONS:  - Assess for changes in respiratory status  - Assess for changes in mentation and behavior  - Position to facilitate oxygenation and minimize respiratory effort  - Oxygen administered by appropriate delivery if ordered  - Initiate smoking cessation education as indicated  - Encourage broncho-pulmonary hygiene including cough, deep breathe, Incentive Spirometry  - Assess the need for suctioning and aspirate as needed  - Assess and instruct to report SOB or any respiratory difficulty  - Respiratory Therapy support as indicated  Outcome: Progressing     Problem: PAIN - ADULT  Goal: Verbalizes/displays adequate comfort level or baseline comfort level  Description: Interventions:  - Encourage patient to monitor pain and request assistance  - Assess pain using appropriate pain scale  - Administer analgesics based on type and severity of pain and evaluate response  - Implement non-pharmacological measures as appropriate and evaluate response  - Consider cultural and social influences on pain and pain management  - Notify physician/advanced practitioner if interventions unsuccessful or patient reports new pain  Outcome: Progressing     Problem: INFECTION - ADULT  Goal: Absence or prevention of progression during hospitalization  Description: INTERVENTIONS:  - Assess and monitor for signs and symptoms of infection  - Monitor lab/diagnostic results  - Monitor all insertion sites, i e  indwelling lines, tubes, and drains  - Monitor endotracheal if appropriate and nasal secretions for changes in amount and color  - [place] appropriate cooling/warming therapies per order  - Administer medications as ordered  - Instruct and encourage patient and family to use good hand hygiene technique  - Identify and instruct in appropriate isolation precautions for identified infection/condition  Outcome: Progressing  Goal: Absence of fever/infection during neutropenic period  Description: INTERVENTIONS:  - Monitor WBC    Outcome: Progressing     Problem: SAFETY ADULT  Goal: Patient will remain free of falls  Description: INTERVENTIONS:  - Educate patient/family on patient safety including physical limitations  - Instruct patient to call for assistance with activity   - Consult OT/PT to assist with strengthening/mobility   - Keep Call bell within reach  - Keep bed low and locked with side rails adjusted as appropriate  - Keep care items and personal belongings within reach  - Initiate and maintain comfort rounds  - Make Fall Risk Sign visible to staff  - Offer Toileting every 2 Hours, in advance of need  - Initiate/Maintain bed/chair alarm  - Obtain necessary fall risk management equipment:   - Apply yellow socks and bracelet for high fall risk patients  - Consider moving patient to room near nurses station  Outcome: Progressing  Goal: Maintain or return to baseline ADL function  Description: INTERVENTIONS:  -  Assess patient's ability to carry out ADLs; assess patient's baseline for ADL function and identify physical deficits which impact ability to perform ADLs (bathing, care of mouth/teeth, toileting, grooming, dressing, etc )  - Assess/evaluate cause of self-care deficits   - Assess range of motion  - Assess patient's mobility; develop plan if impaired  - Assess patient's need for assistive devices and provide as appropriate  - Encourage maximum independence but intervene and supervise when necessary  - Involve family in performance of ADLs  - Assess for home care needs following discharge   - Consider OT consult to assist with ADL evaluation and planning for discharge  - Provide patient education as appropriate  Outcome: Progressing  Goal: Maintains/Returns to pre admission functional level  Description: INTERVENTIONS:  - Perform BMAT or MOVE assessment daily    - Set and communicate daily mobility goal to care team and patient/family/caregiver  - Collaborate with rehabilitation services on mobility goals if consulted  - Perform Range of Motion 3 times a day  - Reposition patient every 2 hours    - Dangle patient 3 times a day  - Stand patient 3 times a day  - Ambulate patient 3 times a day  - Out of bed to chair 3 times a day   - Out of bed for meals 3 times a day  - Out of bed for toileting  - Record patient progress and toleration of activity level   Outcome: Progressing     Problem: DISCHARGE PLANNING  Goal: Discharge to home or other facility with appropriate resources  Description: INTERVENTIONS:  - Identify barriers to discharge w/patient and caregiver  - Arrange for needed discharge resources and transportation as appropriate  - Identify discharge learning needs (meds, wound care, etc )  - Arrange for interpretive services to assist at discharge as needed  - Refer to Case Management Department for coordinating discharge planning if the patient needs post-hospital services based on physician/advanced practitioner order or complex needs related to functional status, cognitive ability, or social support system  Outcome: Progressing     Problem: Knowledge Deficit  Goal: Patient/family/caregiver demonstrates understanding of disease process, treatment plan, medications, and discharge instructions  Description: Complete learning assessment and assess knowledge base    Interventions:  - Provide teaching at level of understanding  - Provide teaching via preferred learning methods  Outcome: Progressing

## 2022-01-08 NOTE — ASSESSMENT & PLAN NOTE
Sodium level was 128 and potassium level was 5 7 in the ED  Likely secondary to diuretic use and hypovolemia  EKG showed acute ST-T changes  Patient was given insulin with D50 and calcium gluconate in ED  Potassium level normalized and sodium level has improved to 132 with IV hydration

## 2022-01-08 NOTE — PROGRESS NOTES
Nguyen 43  Progress Note - Bonnie Menchaca 1940, 78 y o  male MRN: [id number]  Unit/Bed#: [address] [place] [address] Encounter: [id number]  Primary Care Provider: Quentin Rawls DO   Date and time admitted to hospital: 1/7/2022  2:14 PM    * Acute kidney injury superimposed on chronic kidney disease [facility])  Assessment & Plan  Lab Results   Component Value Date    EGFR 14 01/08/2022    EGFR 11 01/07/2022    EGFR 24 08/20/2021    CREATININE 3 75 (H) 01/08/2022    CREATININE 4 30 (H) 01/07/2022    CREATININE 2 38 08/20/2021   Patient has history of chronic kidney disease stage 4 with baseline creatinine around 2 5-3  Patient's creatinine is 4 3 upon admission  Likely secondary dehydration and increased dose of diuretics  No overt uremic symptoms of fluid overload currently  No urgent indication for renal replacement therapy  Continue IV hydration with normal saline at 75 mL/hour  Avoid nephrotoxic agents and hypotension  Continue to hold torsemide  Follow-up BMP in a m    Results from last 7 days   Lab Units 01/08/22  1021 01/07/22  1446   BUN mg/dL 114* 121*   CREATININE mg/dL 3 75* 4 30*       Electrolyte abnormality  Assessment & Plan  Sodium level was 128 and potassium level was 5 7 in the ED  Likely secondary to diuretic use and hypovolemia  EKG showed acute ST-T changes  Patient was given insulin with D50 and calcium gluconate in ED  Potassium level normalized and sodium level has improved to 132 with IV hydration    COVID-19  Assessment & Plan  Patient was tested positive for COVID-19 seen in the ED  Patient's O2 saturation remains high 90s on room air  Chest x-ray showed mild right basilar airspace disease suspicious for pneumonia with small right pleural effusion  Monitor respiratory status  Hold off on any treatment at the current time  Check CRP    Atrial fibrillation [facility])  Assessment & Plan  Patient has history of paroxysmal atrial fibrillation  Continue Coreg 6 25 milligram p o  B i d  And anticoagulation with Eliquis    Chronic systolic congestive heart failure (HCC)  Assessment & Plan  Wt Readings from Last 3 Encounters:   22 76 2 kg (168 lb)   21 76 2 kg (168 lb)   21 73 5 kg (162 lb 1 6 oz)       No clinical evidence of fluid overload  Hold torsemide  Echo showed EF of 40-45% with grade 2 diastolic dysfunction with moderate concentric hypertrophy    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 [facility])  Assessment & Plan  Lab Results   Component Value Date    HGBA1C 7 1 2021    HGBA1C 7 1 2021       Recent Labs     22  2316 22  0805 22  1136 22  1612   POCGLU 140 98 195* 200*       Blood Sugar Average: Last 72 hrs:  (P) 173 8 patient is on Januvia and Amaryl at home which will be held  Continue Humalog sliding scale with Accu-Cheks q a c  And HS  Continue diabetic diet    Essential hypertension  Assessment & Plan  Continue Coreg 6 25 milligram p o  B i d   Hold torsemide in the setting of acute kidney injury and dizziness          VTE Pharmacologic Prophylaxis: VTE Score: 4 Moderate Risk (Score 3-4) - Pharmacological DVT Prophylaxis Ordered: apixaban (Eliquis)  Patient Centered Rounds: I performed bedside rounds with nursing staff today  Discussions with Specialists or Other Care Team Provider: No    Education and Discussions with Family / Patient: yes    Time Spent for Care: 45 minutes  More than 50% of total time spent on counseling and coordination of care as described above  Current Length of Stay: 1 day(s)  Current Patient Status: Inpatient   Certification Statement: The patient will continue to require additional inpatient hospital stay due to Acute kidney injury  Discharge Plan: Anticipate discharge in 24-48 hrs to home  Code Status: Level 1 - Full Code    Subjective:   Patient denies any shortness of breath or cough      Objective:     Vitals:   Temp (24hrs), Av 4 °F (36 9 °C), Min:97 7 °F (36 5 °C), Max:99 5 °F (37 5 °C)    Temp:  [97 7 °F (36 5 °C)-99 5 °F (37 5 °C)] 99 5 °F (37 5 °C)  HR:  [74-82] 75  Resp:  [17-23] 19  BP: (102-151)/(53-73) 116/62  SpO2:  [97 %-99 %] 97 %  Body mass index is 24 81 kg/m²  Input and Output Summary (last 24 hours):   No intake or output data in the 24 hours ending 01/08/22 1751    Physical Exam:   Physical Exam  Constitutional:       Appearance: Normal appearance  HENT:      Head: Normocephalic and atraumatic  Eyes:      Extraocular Movements: Extraocular movements intact  Pupils: Pupils are equal, round, and reactive to light  Cardiovascular:      Rate and Rhythm: Normal rate and regular rhythm  Heart sounds: No murmur heard  No gallop  Pulmonary:      Effort: Pulmonary effort is normal       Breath sounds: Normal breath sounds  Abdominal:      General: Bowel sounds are normal       Palpations: Abdomen is soft  Tenderness: There is no abdominal tenderness  Musculoskeletal:         General: No swelling or deformity  Normal range of motion  Cervical back: Normal range of motion and neck supple  Skin:     General: Skin is warm and dry  Neurological:      General: No focal deficit present  Mental Status: He is alert            Additional Data:     Labs:  Results from last 7 days   Lab Units 01/08/22  1021   WBC Thousand/uL 7 44   HEMOGLOBIN g/dL 13 6   HEMATOCRIT % 41 1   PLATELETS Thousands/uL 112*   NEUTROS PCT % 72   LYMPHS PCT % 12*   MONOS PCT % 13*   EOS PCT % 2     Results from last 7 days   Lab Units 01/08/22  1021 01/07/22  1446 01/07/22  1446   SODIUM mmol/L 132*   < > 126*   POTASSIUM mmol/L 4 6   < > 5 7*   CHLORIDE mmol/L 97*   < > 89*   CO2 mmol/L 23   < > 24   BUN mg/dL 114*   < > 121*   CREATININE mg/dL 3 75*   < > 4 30*   ANION GAP mmol/L 12   < > 13   CALCIUM mg/dL 7 9*   < > 8 4   ALBUMIN g/dL  --   --  3 6   TOTAL BILIRUBIN mg/dL  --   --  0 88   ALK PHOS U/L  --   --  145*   ALT U/L  --   --  43   AST U/L  --   --  21   GLUCOSE RANDOM mg/dL 75   < > 236*    < > = values in this interval not displayed       Results from last 7 days   Lab Units 01/07/22  1446   INR  1 45*     Results from last 7 days   Lab Units 01/08/22  1612 01/08/22  1136 01/08/22  0805 01/07/22  2316 01/07/22  1433   POC GLUCOSE mg/dl 200* 195* 98 140 236*               Lines/Drains:  Invasive Devices  Report    Peripheral Intravenous Line            Peripheral IV 01/07/22 Right Antecubital 1 day                      Imaging: Reviewed radiology reports from this admission including: chest xray    Recent Cultures (last 7 days):         Last 24 Hours Medication List:   Current Facility-Administered Medications   Medication Dose Route Frequency Provider Last Rate    acetaminophen  650 mg Oral Q6H PRN Zainab Carballo MD      allopurinol  100 mg Oral BID Zainab Carballo MD      ALPRAZolam  0 5 mg Oral HS PRN Zainab Carballo MD      apixaban  2 5 mg Oral BID Zainab Carballo MD      ascorbic acid  500 mg Oral Daily Zainab Carballo MD      atorvastatin  40 mg Oral Daily With Hope Lauren MD      carvedilol  6 25 mg Oral BID With Meals Zainab Carballo MD      cholecalciferol  1,000 Units Oral Daily Zainab Carballo MD      insulin lispro  1-5 Units Subcutaneous HS Zainab Carballo MD      insulin lispro  1-6 Units Subcutaneous TID AC Zainab Carballo MD      isosorbide mononitrate  30 mg Oral Daily Zainab Carballo MD      latanoprost  1 drop Both Eyes HS Zainab Carballo MD      ondansetron  4 mg Intravenous Q6H PRN Zainab Carballo MD      sodium chloride  75 mL/hr Intravenous Continuous Zainab Carballo MD 75 mL/hr (01/07/22 2311)    sodium chloride  75 mL/hr Intravenous Continuous Zainab Carballo MD      timolol  1 drop Both Eyes Daily Zainab Carballo MD          Today, Patient Was Seen By: Zainab Carballo MD    **Please Note: This note may have been constructed using a voice recognition system  **

## 2022-01-08 NOTE — ASSESSMENT & PLAN NOTE
Patient was tested positive for COVID-19 seen in the ED  Patient's O2 saturation remains high 90s on room air  Chest x-ray showed mild right basilar airspace disease suspicious for pneumonia with small right pleural effusion  Monitor respiratory status  Hold off on any treatment at the current time  Check CRP

## 2022-01-08 NOTE — PLAN OF CARE
Problem: CARDIOVASCULAR - ADULT  Goal: Maintains optimal cardiac output and hemodynamic stability  Description: INTERVENTIONS:  - Monitor I/O, vital signs and rhythm  - Monitor for S/S and trends of decreased cardiac output  - Administer and titrate ordered vasoactive medications to optimize hemodynamic stability  - Assess quality of pulses, skin color and temperature  - Assess for signs of decreased coronary artery perfusion  - Instruct patient to report change in severity of symptoms  Outcome: Progressing  Goal: Absence of cardiac dysrhythmias or at baseline rhythm  Description: INTERVENTIONS:  - Continuous cardiac monitoring, vital signs, obtain 12 lead EKG if ordered  - Administer antiarrhythmic and heart rate control medications as ordered  - Monitor electrolytes and administer replacement therapy as ordered  Outcome: Progressing     Problem: RESPIRATORY - ADULT  Goal: Achieves optimal ventilation and oxygenation  Description: INTERVENTIONS:  - Assess for changes in respiratory status  - Assess for changes in mentation and behavior  - Position to facilitate oxygenation and minimize respiratory effort  - Oxygen administered by appropriate delivery if ordered  - Initiate smoking cessation education as indicated  - Encourage broncho-pulmonary hygiene including cough, deep breathe, Incentive Spirometry  - Assess the need for suctioning and aspirate as needed  - Assess and instruct to report SOB or any respiratory difficulty  - Respiratory Therapy support as indicated  Outcome: Progressing     Problem: PAIN - ADULT  Goal: Verbalizes/displays adequate comfort level or baseline comfort level  Description: Interventions:  - Encourage patient to monitor pain and request assistance  - Assess pain using appropriate pain scale  - Administer analgesics based on type and severity of pain and evaluate response  - Implement non-pharmacological measures as appropriate and evaluate response  - Consider cultural and social influences on pain and pain management  - Notify physician/advanced practitioner if interventions unsuccessful or patient reports new pain  Outcome: Progressing     Problem: INFECTION - ADULT  Goal: Absence or prevention of progression during hospitalization  Description: INTERVENTIONS:  - Assess and monitor for signs and symptoms of infection  - Monitor lab/diagnostic results  - Monitor all insertion sites, i e  indwelling lines, tubes, and drains  - Monitor endotracheal if appropriate and nasal secretions for changes in amount and color  - [place] appropriate cooling/warming therapies per order  - Administer medications as ordered  - Instruct and encourage patient and family to use good hand hygiene technique  - Identify and instruct in appropriate isolation precautions for identified infection/condition  Outcome: Progressing  Goal: Absence of fever/infection during neutropenic period  Description: INTERVENTIONS:  - Monitor WBC    Outcome: Progressing     Problem: SAFETY ADULT  Goal: Patient will remain free of falls  Description: INTERVENTIONS:  - Educate patient/family on patient safety including physical limitations  - Instruct patient to call for assistance with activity   - Consult OT/PT to assist with strengthening/mobility   - Keep Call bell within reach  - Keep bed low and locked with side rails adjusted as appropriate  - Keep care items and personal belongings within reach  - Initiate and maintain comfort rounds  - Make Fall Risk Sign visible to staff  - Offer Toileting every 2 Hours, in advance of need  - Initiate/Maintain bed alarm  - Obtain necessary fall risk management equipment: fall risk   - Apply yellow socks and bracelet for high fall risk patients  - Consider moving patient to room near nurses station  Outcome: Progressing  Goal: Maintain or return to baseline ADL function  Description: INTERVENTIONS:  -  Assess patient's ability to carry out ADLs; assess patient's baseline for ADL function and identify physical deficits which impact ability to perform ADLs (bathing, care of mouth/teeth, toileting, grooming, dressing, etc )  - Assess/evaluate cause of self-care deficits   - Assess range of motion  - Assess patient's mobility; develop plan if impaired  - Assess patient's need for assistive devices and provide as appropriate  - Encourage maximum independence but intervene and supervise when necessary  - Involve family in performance of ADLs  - Assess for home care needs following discharge   - Consider OT consult to assist with ADL evaluation and planning for discharge  - Provide patient education as appropriate  Outcome: Progressing  Goal: Maintains/Returns to pre admission functional level  Description: INTERVENTIONS:  - Perform BMAT or MOVE assessment daily    - Set and communicate daily mobility goal to care team and patient/family/caregiver  - Collaborate with rehabilitation services on mobility goals if consulted  - Perform Range of Motion 2 times a day  - Reposition patient every 2 hours    - Dangle patient 2 times a day  - Stand patient 2 times a day  - Ambulate patient 2 times a day  - Out of bed to chair 2 times a day   - Out of bed for meals 2 times a day  - Out of bed for toileting  - Record patient progress and toleration of activity level   Outcome: Progressing     Problem: DISCHARGE PLANNING  Goal: Discharge to home or other facility with appropriate resources  Description: INTERVENTIONS:  - Identify barriers to discharge w/patient and caregiver  - Arrange for needed discharge resources and transportation as appropriate  - Identify discharge learning needs (meds, wound care, etc )  - Arrange for interpretive services to assist at discharge as needed  - Refer to Case Management Department for coordinating discharge planning if the patient needs post-hospital services based on physician/advanced practitioner order or complex needs related to functional status, cognitive ability, or social support system  Outcome: Progressing     Problem: Knowledge Deficit  Goal: Patient/family/caregiver demonstrates understanding of disease process, treatment plan, medications, and discharge instructions  Description: Complete learning assessment and assess knowledge base    Interventions:  - Provide teaching at level of understanding  - Provide teaching via preferred learning methods  Outcome: Progressing     Problem: Prexisting or High Potential for Compromised Skin Integrity  Goal: Skin integrity is maintained or improved  Description: INTERVENTIONS:  - Identify patients at risk for skin breakdown  - Assess and monitor skin integrity  - Assess and monitor nutrition and hydration status  - Monitor labs   - Assess for incontinence   - Turn and reposition patient  - Assist with mobility/ambulation  - Relieve pressure over bony prominences  - Avoid friction and shearing  - Provide appropriate hygiene as needed including keeping skin clean and dry  - Evaluate need for skin moisturizer/barrier cream  - Collaborate with interdisciplinary team   - Patient/family teaching  - Consider wound care consult   Outcome: Progressing     Problem: Potential for Falls  Goal: Patient will remain free of falls  Description: INTERVENTIONS:  - Educate patient/family on patient safety including physical limitations  - Instruct patient to call for assistance with activity   - Consult OT/PT to assist with strengthening/mobility   - Keep Call bell within reach  - Keep bed low and locked with side rails adjusted as appropriate  - Keep care items and personal belongings within reach  - Initiate and maintain comfort rounds  - Make Fall Risk Sign visible to staff  - Offer Toileting every 2 Hours, in advance of need  - Initiate/Maintain bed alarm  - Obtain necessary fall risk management equipment: fall risk   - Apply yellow socks and bracelet for high fall risk patients  - Consider moving patient to room near nurses station  Outcome: Progressing

## 2022-01-08 NOTE — ASSESSMENT & PLAN NOTE
Lab Results   Component Value Date    EGFR 14 01/08/2022    EGFR 11 01/07/2022    EGFR 24 08/20/2021    CREATININE 3 75 (H) 01/08/2022    CREATININE 4 30 (H) 01/07/2022    CREATININE 2 38 08/20/2021   Patient has history of chronic kidney disease stage 4 with baseline creatinine around 2 5-3  Patient's creatinine is 4 3 upon admission  Likely secondary dehydration and increased dose of diuretics  No overt uremic symptoms of fluid overload currently  No urgent indication for renal replacement therapy  Continue IV hydration with normal saline at 75 mL/hour  Avoid nephrotoxic agents and hypotension  Continue to hold torsemide  Follow-up BMP in a m    Results from last 7 days   Lab Units 01/08/22  1021 01/07/22  1446   BUN mg/dL 114* 121*   CREATININE mg/dL 3 75* 4 30*

## 2022-01-08 NOTE — ASSESSMENT & PLAN NOTE
Lab Results   Component Value Date    HGBA1C 7 1 02/04/2021    HGBA1C 7 1 02/04/2021       Recent Labs     01/07/22  2316 01/08/22  0805 01/08/22  1136 01/08/22  1612   POCGLU 140 98 195* 200*       Blood Sugar Average: Last 72 hrs:  (P) 173 8 patient is on Januvia and Amaryl at home which will be held  Continue Humalog sliding scale with Accu-Cheks q a c   And HS  Continue diabetic diet

## 2022-01-09 VITALS
SYSTOLIC BLOOD PRESSURE: 125 MMHG | BODY MASS INDEX: 24.88 KG/M2 | OXYGEN SATURATION: 100 % | DIASTOLIC BLOOD PRESSURE: 69 MMHG | TEMPERATURE: 97.1 F | RESPIRATION RATE: 18 BRPM | HEART RATE: 74 BPM | WEIGHT: 168 LBS | HEIGHT: 69 IN

## 2022-01-09 LAB
ANION GAP SERPL CALCULATED.3IONS-SCNC: 10 MMOL/L (ref 4–13)
BUN SERPL-MCNC: 97 MG/DL (ref 5–25)
CALCIUM SERPL-MCNC: 7.5 MG/DL (ref 8.3–10.1)
CHLORIDE SERPL-SCNC: 103 MMOL/L (ref 100–108)
CO2 SERPL-SCNC: 21 MMOL/L (ref 21–32)
CREAT SERPL-MCNC: 3.28 MG/DL (ref 0.6–1.3)
CRP SERPL QL: 13.9 MG/L
GFR SERPL CREATININE-BSD FRML MDRD: 16 ML/MIN/1.73SQ M
GLUCOSE SERPL-MCNC: 209 MG/DL (ref 65–140)
GLUCOSE SERPL-MCNC: 55 MG/DL (ref 65–140)
GLUCOSE SERPL-MCNC: 62 MG/DL (ref 65–140)
POTASSIUM SERPL-SCNC: 4.8 MMOL/L (ref 3.5–5.3)
PROCALCITONIN SERPL-MCNC: 0.1 NG/ML
SODIUM SERPL-SCNC: 134 MMOL/L (ref 136–145)

## 2022-01-09 PROCEDURE — 86140 C-REACTIVE PROTEIN: CPT | Performed by: INTERNAL MEDICINE

## 2022-01-09 PROCEDURE — 80048 BASIC METABOLIC PNL TOTAL CA: CPT | Performed by: INTERNAL MEDICINE

## 2022-01-09 PROCEDURE — 84145 PROCALCITONIN (PCT): CPT | Performed by: INTERNAL MEDICINE

## 2022-01-09 PROCEDURE — 82948 REAGENT STRIP/BLOOD GLUCOSE: CPT

## 2022-01-09 PROCEDURE — 99239 HOSP IP/OBS DSCHRG MGMT >30: CPT | Performed by: INTERNAL MEDICINE

## 2022-01-09 RX ORDER — TORSEMIDE 20 MG/1
20 TABLET ORAL DAILY
Refills: 0
Start: 2022-01-09

## 2022-01-09 RX ADMIN — ALLOPURINOL 100 MG: 100 TABLET ORAL at 08:31

## 2022-01-09 RX ADMIN — APIXABAN 2.5 MG: 2.5 TABLET, FILM COATED ORAL at 08:32

## 2022-01-09 RX ADMIN — SODIUM CHLORIDE 75 ML/HR: 0.9 INJECTION, SOLUTION INTRAVENOUS at 05:57

## 2022-01-09 RX ADMIN — TIMOLOL MALEATE 1 DROP: 5 SOLUTION/ DROPS OPHTHALMIC at 10:03

## 2022-01-09 RX ADMIN — INSULIN LISPRO 2 UNITS: 100 INJECTION, SOLUTION INTRAVENOUS; SUBCUTANEOUS at 11:12

## 2022-01-09 RX ADMIN — OXYCODONE HYDROCHLORIDE AND ACETAMINOPHEN 500 MG: 500 TABLET ORAL at 08:32

## 2022-01-09 RX ADMIN — Medication 1000 UNITS: at 08:32

## 2022-01-09 NOTE — ASSESSMENT & PLAN NOTE
Lab Results   Component Value Date    EGFR 16 01/09/2022    EGFR 14 01/08/2022    EGFR 11 01/07/2022    CREATININE 3 28 (H) 01/09/2022    CREATININE 3 75 (H) 01/08/2022    CREATININE 4 30 (H) 01/07/2022   Patient has history of chronic kidney disease stage 4 with baseline creatinine around 2 5-3  Patient's creatinine is 4 3 upon admission  Likely secondary dehydration and increased dose of diuretics  No overt uremic symptoms of fluid overload currently  No urgent indication for renal replacement therapy  Patient received IV hydration with improved creatinine to 3 2  Avoid nephrotoxic agents and hypotension  Discussed with patient's primary nephrologist   Patient to be discharged on torsemide 20 milligram p o   Daily within a follow-up BMP in 1 week    Results from last 7 days   Lab Units 01/09/22  0459 01/08/22  1021 01/07/22  1446   BUN mg/dL 97* 114* 121*   CREATININE mg/dL 3 28* 3 75* 4 30*

## 2022-01-09 NOTE — ASSESSMENT & PLAN NOTE
Wt Readings from Last 3 Encounters:   01/07/22 76 2 kg (168 lb)   12/01/21 76 2 kg (168 lb)   09/08/21 73 5 kg (162 lb 1 6 oz)       No clinical evidence of fluid overload  Resume torsemide at a lower dose  Echo showed EF of 40-45% with grade 2 diastolic dysfunction with moderate concentric hypertrophy

## 2022-01-09 NOTE — PLAN OF CARE
Problem: CARDIOVASCULAR - ADULT  Goal: Maintains optimal cardiac output and hemodynamic stability  Description: INTERVENTIONS:  - Monitor I/O, vital signs and rhythm  - Monitor for S/S and trends of decreased cardiac output  - Administer and titrate ordered vasoactive medications to optimize hemodynamic stability  - Assess quality of pulses, skin color and temperature  - Assess for signs of decreased coronary artery perfusion  - Instruct patient to report change in severity of symptoms  Outcome: Progressing     Problem: CARDIOVASCULAR - ADULT  Goal: Absence of cardiac dysrhythmias or at baseline rhythm  Description: INTERVENTIONS:  - Continuous cardiac monitoring, vital signs, obtain 12 lead EKG if ordered  - Administer antiarrhythmic and heart rate control medications as ordered  - Monitor electrolytes and administer replacement therapy as ordered  Outcome: Progressing     Problem: RESPIRATORY - ADULT  Goal: Achieves optimal ventilation and oxygenation  Description: INTERVENTIONS:  - Assess for changes in respiratory status  - Assess for changes in mentation and behavior  - Position to facilitate oxygenation and minimize respiratory effort  - Oxygen administered by appropriate delivery if ordered  - Initiate smoking cessation education as indicated  - Encourage broncho-pulmonary hygiene including cough, deep breathe, Incentive Spirometry  - Assess the need for suctioning and aspirate as needed  - Assess and instruct to report SOB or any respiratory difficulty  - Respiratory Therapy support as indicated  Outcome: Progressing     Problem: PAIN - ADULT  Goal: Verbalizes/displays adequate comfort level or baseline comfort level  Description: Interventions:  - Encourage patient to monitor pain and request assistance  - Assess pain using appropriate pain scale  - Administer analgesics based on type and severity of pain and evaluate response  - Implement non-pharmacological measures as appropriate and evaluate response  - Consider cultural and social influences on pain and pain management  - Notify physician/advanced practitioner if interventions unsuccessful or patient reports new pain  Outcome: Progressing     Problem: INFECTION - ADULT  Goal: Absence or prevention of progression during hospitalization  Description: INTERVENTIONS:  - Assess and monitor for signs and symptoms of infection  - Monitor lab/diagnostic results  - Monitor all insertion sites, i e  indwelling lines, tubes, and drains  - Monitor endotracheal if appropriate and nasal secretions for changes in amount and color  - [place] appropriate cooling/warming therapies per order  - Administer medications as ordered  - Instruct and encourage patient and family to use good hand hygiene technique  - Identify and instruct in appropriate isolation precautions for identified infection/condition  Outcome: Progressing     Problem: Knowledge Deficit  Goal: Patient/family/caregiver demonstrates understanding of disease process, treatment plan, medications, and discharge instructions  Description: Complete learning assessment and assess knowledge base    Interventions:  - Provide teaching at level of understanding  - Provide teaching via preferred learning methods  Outcome: Progressing     Problem: Prexisting or High Potential for Compromised Skin Integrity  Goal: Skin integrity is maintained or improved  Description: INTERVENTIONS:  - Identify patients at risk for skin breakdown  - Assess and monitor skin integrity  - Assess and monitor nutrition and hydration status  - Monitor labs   - Assess for incontinence   - Turn and reposition patient  - Assist with mobility/ambulation  - Relieve pressure over bony prominences  - Avoid friction and shearing  - Provide appropriate hygiene as needed including keeping skin clean and dry  - Evaluate need for skin moisturizer/barrier cream  - Collaborate with interdisciplinary team   - Patient/family teaching  - Consider wound care consult   Outcome: Progressing     Problem: Potential for Falls  Goal: Patient will remain free of falls  Description: INTERVENTIONS:  - Educate patient/family on patient safety including physical limitations  - Instruct patient to call for assistance with activity   - Consult OT/PT to assist with strengthening/mobility   - Keep Call bell within reach  - Keep bed low and locked with side rails adjusted as appropriate  - Keep care items and personal belongings within reach  - Initiate and maintain comfort rounds  - Make Fall Risk Sign visible to staff  - Offer Toileting every 3 Hours, in advance of need  - Initiate/Maintain bed/chair alarm  - Obtain necessary fall risk management equipment:   - Apply yellow socks and bracelet for high fall risk patients  - Consider moving patient to room near nurses station  Outcome: Progressing

## 2022-01-09 NOTE — ASSESSMENT & PLAN NOTE
RLQ ABD PAIN STARTING YESTERDAY.  SENT BY URGENT AID TO [facility] Sodium level was 128 and potassium level was 5 7 in the ED  Likely secondary to diuretic use and hypovolemia  EKG showed acute ST-T changes  Patient was given insulin with D50 and calcium gluconate in ED  Potassium level normalized and sodium level has improved to 134 with IV hydration  Will discontinue fluids with follow-up BMP in 1 week  Resume torsemide 20 milligram starting tomorrow

## 2022-01-09 NOTE — DISCHARGE INSTRUCTIONS

## 2022-01-09 NOTE — DISCHARGE SUMMARY
Fritz FELICIANO  64   Discharge- Mary Go 1940, 78 y o  male MRN: [id number]  Unit/Bed#: [address] [place] [address] Encounter: [id number]  Primary Care Provider: Alannah Keenan DO   Date and time admitted to hospital: 1/7/2022  2:14 PM    * Acute kidney injury superimposed on chronic kidney disease [facility])  Assessment & Plan  Lab Results   Component Value Date    EGFR 16 01/09/2022    EGFR 14 01/08/2022    EGFR 11 01/07/2022    CREATININE 3 28 (H) 01/09/2022    CREATININE 3 75 (H) 01/08/2022    CREATININE 4 30 (H) 01/07/2022   Patient has history of chronic kidney disease stage 4 with baseline creatinine around 2 5-3  Patient's creatinine is 4 3 upon admission  Likely secondary dehydration and increased dose of diuretics  No overt uremic symptoms of fluid overload currently  No urgent indication for renal replacement therapy  Patient received IV hydration with improved creatinine to 3 2  Avoid nephrotoxic agents and hypotension  Discussed with patient's primary nephrologist   Patient to be discharged on torsemide 20 milligram p o   Daily within a follow-up BMP in 1 week    Results from last 7 days   Lab Units 01/09/22  0459 01/08/22  1021 01/07/22  1446   BUN mg/dL 97* 114* 121*   CREATININE mg/dL 3 28* 3 75* 4 30*       Electrolyte abnormality  Assessment & Plan  Sodium level was 128 and potassium level was 5 7 in the ED  Likely secondary to diuretic use and hypovolemia  EKG showed acute ST-T changes  Patient was given insulin with D50 and calcium gluconate in ED  Potassium level normalized and sodium level has improved to 134 with IV hydration  Will discontinue fluids with follow-up BMP in 1 week  Resume torsemide 20 milligram starting tomorrow    COVID-19  Assessment & Plan  Patient was tested positive for COVID-19 seen in the ED  Patient's O2 saturation remains high 90s on room air  Chest x-ray showed mild right basilar airspace disease suspicious for pneumonia with small right pleural effusion  Patient continued remained stable on room air  CRP was 13 9    Atrial fibrillation [facility])  Assessment & Plan  Patient has history of paroxysmal atrial fibrillation  Continue Coreg 6 25 milligram p o  B i d  And anticoagulation with Eliquis    Chronic systolic congestive heart failure (HCC)  Assessment & Plan  Wt Readings from Last 3 Encounters:   01/07/22 76 2 kg (168 lb)   12/01/21 76 2 kg (168 lb)   09/08/21 73 5 kg (162 lb 1 6 oz)       No clinical evidence of fluid overload  Resume torsemide at a lower dose  Echo showed EF of 40-45% with grade 2 diastolic dysfunction with moderate concentric hypertrophy    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 [facility])  Assessment & Plan  Lab Results   Component Value Date    HGBA1C 7 1 02/04/2021    HGBA1C 7 1 02/04/2021       Recent Labs     01/08/22  1612 01/08/22  2041 01/09/22  0820 01/09/22  1111   POCGLU 200* 81 55* 209*       Blood Sugar Average: Last 72 hrs:  (P) 151 75 patient is on Januvia and Amaryl at home which will be held  Continue Humalog sliding scale with Accu-Cheks q a c  And HS  Continue diabetic diet    Essential hypertension  Assessment & Plan  Continue Coreg 6 25 milligram p o  B i d  Resume torsemide at 20 milligram p o  Daily        Medical Problems             Resolved Problems  Date Reviewed: 1/9/2022    None              Discharging Physician / Practitioner: Shorty Brown MD  PCP: Coretta Tee DO  Admission Date:   Admission Orders (From admission, onward)     Ordered        01/07/22 1627  INPATIENT ADMISSION  Once                      Discharge Date: 01/09/22       Outpatient Tests Requested:  · BMP in 1 week    Follow up outpatient with Nephrology in 2 weeks    Complications:  None    Reason for Admission:  Dizziness and low blood pressure    Hospital Course:   Pablo Nevarez is a 78 y o  male patient with past medical history of heart failure, CKD stage 5, hypertension, diabetes, PA who originally presented to the hospital on 1/7/2022 due to dizziness and low blood pressure  Patient noted to have hypotension and acute kidney injury in the ED  Patient also tested positive for COVID  Patient continued remained asymptomatic from [facility] and and continued remained stable on room air  Patient was given IV hydration with improved BUN creatinine numbers  Coordination of care discussed with patient's primary nephrologist   Patient to be started on torsemide 20 milligram p o  Daily with follow-up BMP in 1 week  Please see above list of diagnoses and related plan for additional information  Condition at Discharge: stable    Discharge Day Visit / Exam:   Subjective:  Patient is feeling well  Denies any chest pain, shortness of breath  Patient has been urinating well and did have a bowel movement  Vitals: Blood Pressure: 125/69 (01/09/22 1406)  Pulse: 74 (01/09/22 1406)  Temperature: (!) 97 1 °F (36 2 °C) (01/09/22 1406)  Temp Source: Oral (01/09/22 1406)  Respirations: 18 (01/09/22 1406)  Height: 5' 9" (175 3 cm) (01/07/22 1249)  Weight - Scale: 76 2 kg (168 lb) (01/07/22 1249)  SpO2: 100 % (01/09/22 1406)  Exam:   Physical Exam  Constitutional:       Appearance: Normal appearance  HENT:      Head: Normocephalic and atraumatic  Nose: Nose normal       Mouth/Throat:      Mouth: Mucous membranes are moist       Pharynx: Oropharynx is clear  Eyes:      Extraocular Movements: Extraocular movements intact  Pupils: Pupils are equal, round, and reactive to light  Cardiovascular:      Rate and Rhythm: Normal rate and regular rhythm  Pulmonary:      Effort: Pulmonary effort is normal       Breath sounds: Normal breath sounds  Abdominal:      General: Bowel sounds are normal  There is no distension  Palpations: Abdomen is soft  Tenderness: There is no abdominal tenderness  Musculoskeletal:         General: No swelling        Cervical back: Normal range of motion and neck supple  Skin:     General: Skin is warm and dry  Neurological:      General: No focal deficit present  Mental Status: He is alert  Discussion with Family: yes    Discharge instructions/Information to patient and family:   See after visit summary for information provided to patient and family  Provisions for Follow-Up Care:  See after visit summary for information related to follow-up care and any pertinent home health orders  Disposition:   Home    Planned Readmission: No     Discharge Statement:  I spent 35 minutes discharging the patient  This time was spent on the day of discharge  I had direct contact with the patient on the day of discharge  Greater than 50% of the total time was spent examining patient, answering all patient questions, arranging and discussing plan of care with patient as well as directly providing post-discharge instructions  Additional time then spent on discharge activities  Discharge Medications:  See after visit summary for reconciled discharge medications provided to patient and/or family        **Please Note: This note may have been constructed using a voice recognition system**

## 2022-01-09 NOTE — ASSESSMENT & PLAN NOTE
Lab Results   Component Value Date    HGBA1C 7 1 02/04/2021    HGBA1C 7 1 02/04/2021       Recent Labs     01/08/22  1612 01/08/22  2041 01/09/22  0820 01/09/22  1111   POCGLU 200* 81 55* 209*       Blood Sugar Average: Last 72 hrs:  (P) 151 75 patient is on Januvia and Amaryl at home which will be held  Continue Humalog sliding scale with Accu-Cheks q a c   And HS  Continue diabetic diet

## 2022-01-09 NOTE — PLAN OF CARE
Problem: CARDIOVASCULAR - ADULT  Goal: Maintains optimal cardiac output and hemodynamic stability  Description: INTERVENTIONS:  - Monitor I/O, vital signs and rhythm  - Monitor for S/S and trends of decreased cardiac output  - Administer and titrate ordered vasoactive medications to optimize hemodynamic stability  - Assess quality of pulses, skin color and temperature  - Assess for signs of decreased coronary artery perfusion  - Instruct patient to report change in severity of symptoms  Outcome: Progressing  Goal: Absence of cardiac dysrhythmias or at baseline rhythm  Description: INTERVENTIONS:  - Continuous cardiac monitoring, vital signs, obtain 12 lead EKG if ordered  - Administer antiarrhythmic and heart rate control medications as ordered  - Monitor electrolytes and administer replacement therapy as ordered  Outcome: Progressing     Problem: RESPIRATORY - ADULT  Goal: Achieves optimal ventilation and oxygenation  Description: INTERVENTIONS:  - Assess for changes in respiratory status  - Assess for changes in mentation and behavior  - Position to facilitate oxygenation and minimize respiratory effort  - Oxygen administered by appropriate delivery if ordered  - Initiate smoking cessation education as indicated  - Encourage broncho-pulmonary hygiene including cough, deep breathe, Incentive Spirometry  - Assess the need for suctioning and aspirate as needed  - Assess and instruct to report SOB or any respiratory difficulty  - Respiratory Therapy support as indicated  Outcome: Progressing     Problem: PAIN - ADULT  Goal: Verbalizes/displays adequate comfort level or baseline comfort level  Description: Interventions:  - Encourage patient to monitor pain and request assistance  - Assess pain using appropriate pain scale  - Administer analgesics based on type and severity of pain and evaluate response  - Implement non-pharmacological measures as appropriate and evaluate response  - Consider cultural and social influences on pain and pain management  - Notify physician/advanced practitioner if interventions unsuccessful or patient reports new pain  Outcome: Progressing     Problem: INFECTION - ADULT  Goal: Absence or prevention of progression during hospitalization  Description: INTERVENTIONS:  - Assess and monitor for signs and symptoms of infection  - Monitor lab/diagnostic results  - Monitor all insertion sites, i e  indwelling lines, tubes, and drains  - Monitor endotracheal if appropriate and nasal secretions for changes in amount and color  - [place] appropriate cooling/warming therapies per order  - Administer medications as ordered  - Instruct and encourage patient and family to use good hand hygiene technique  - Identify and instruct in appropriate isolation precautions for identified infection/condition  Outcome: Progressing  Goal: Absence of fever/infection during neutropenic period  Description: INTERVENTIONS:  - Monitor WBC    Outcome: Progressing     Problem: SAFETY ADULT  Goal: Patient will remain free of falls  Description: INTERVENTIONS:  - Educate patient/family on patient safety including physical limitations  - Instruct patient to call for assistance with activity   - Consult OT/PT to assist with strengthening/mobility   - Keep Call bell within reach  - Keep bed low and locked with side rails adjusted as appropriate  - Keep care items and personal belongings within reach  - Initiate and maintain comfort rounds  - Make Fall Risk Sign visible to staff  - Apply yellow socks and bracelet for high fall risk patients  - Consider moving patient to room near nurses station  Outcome: Progressing  Goal: Maintain or return to baseline ADL function  Description: INTERVENTIONS:  -  Assess patient's ability to carry out ADLs; assess patient's baseline for ADL function and identify physical deficits which impact ability to perform ADLs (bathing, care of mouth/teeth, toileting, grooming, dressing, etc )  - Assess/evaluate cause of self-care deficits   - Assess range of motion  - Assess patient's mobility; develop plan if impaired  - Assess patient's need for assistive devices and provide as appropriate  - Encourage maximum independence but intervene and supervise when necessary  - Involve family in performance of ADLs  - Assess for home care needs following discharge   - Consider OT consult to assist with ADL evaluation and planning for discharge  - Provide patient education as appropriate  Outcome: Progressing  Goal: Maintains/Returns to pre admission functional level  Description: INTERVENTIONS:  - Perform BMAT or MOVE assessment daily    - Set and communicate daily mobility goal to care team and patient/family/caregiver  - Collaborate with rehabilitation services on mobility goals if consulted  - Out of bed for toileting  - Record patient progress and toleration of activity level   Outcome: Progressing     Problem: DISCHARGE PLANNING  Goal: Discharge to home or other facility with appropriate resources  Description: INTERVENTIONS:  - Identify barriers to discharge w/patient and caregiver  - Arrange for needed discharge resources and transportation as appropriate  - Identify discharge learning needs (meds, wound care, etc )  - Arrange for interpretive services to assist at discharge as needed  - Refer to Case Management Department for coordinating discharge planning if the patient needs post-hospital services based on physician/advanced practitioner order or complex needs related to functional status, cognitive ability, or social support system  Outcome: Progressing     Problem: Knowledge Deficit  Goal: Patient/family/caregiver demonstrates understanding of disease process, treatment plan, medications, and discharge instructions  Description: Complete learning assessment and assess knowledge base    Interventions:  - Provide teaching at level of understanding  - Provide teaching via preferred learning methods  Outcome: Progressing     Problem: Potential for Falls  Goal: Patient will remain free of falls  Description: INTERVENTIONS:  - Educate patient/family on patient safety including physical limitations  - Instruct patient to call for assistance with activity   - Consult OT/PT to assist with strengthening/mobility   - Keep Call bell within reach  - Keep bed low and locked with side rails adjusted as appropriate  - Keep care items and personal belongings within reach  - Initiate and maintain comfort rounds  - Apply yellow socks and bracelet for high fall risk patients  - Consider moving patient to room near nurses station  Outcome: Progressing     Problem: Prexisting or High Potential for Compromised Skin Integrity  Goal: Skin integrity is maintained or improved  Description: INTERVENTIONS:  - Identify patients at risk for skin breakdown  - Assess and monitor skin integrity  - Assess and monitor nutrition and hydration status  - Monitor labs   - Assess for incontinence   - Turn and reposition patient  - Assist with mobility/ambulation  - Relieve pressure over bony prominences  - Avoid friction and shearing  - Provide appropriate hygiene as needed including keeping skin clean and dry  - Evaluate need for skin moisturizer/barrier cream  - Collaborate with interdisciplinary team   - Patient/family teaching  - Consider wound care consult   Outcome: Progressing     Problem: MOBILITY - ADULT  Goal: Maintain or return to baseline ADL function  Description: INTERVENTIONS:  -  Assess patient's ability to carry out ADLs; assess patient's baseline for ADL function and identify physical deficits which impact ability to perform ADLs (bathing, care of mouth/teeth, toileting, grooming, dressing, etc )  - Assess/evaluate cause of self-care deficits   - Assess range of motion  - Assess patient's mobility; develop plan if impaired  - Assess patient's need for assistive devices and provide as appropriate  - Encourage maximum independence but intervene and supervise when necessary  - Involve family in performance of ADLs  - Assess for home care needs following discharge   - Consider OT consult to assist with ADL evaluation and planning for discharge  - Provide patient education as appropriate  Outcome: Progressing  Goal: Maintains/Returns to pre admission functional level  Description: INTERVENTIONS:  - Perform BMAT or MOVE assessment daily    - Set and communicate daily mobility goal to care team and patient/family/caregiver     - Collaborate with rehabilitation services on mobility goals if consulted  - Record patient progress and toleration of activity level   Outcome: Progressing

## 2022-01-09 NOTE — ASSESSMENT & PLAN NOTE
Patient was tested positive for COVID-19 seen in the ED  Patient's O2 saturation remains high 90s on room air  Chest x-ray showed mild right basilar airspace disease suspicious for pneumonia with small right pleural effusion  Patient continued remained stable on room air  CRP was 13 9

## 2022-01-10 LAB
QRS AXIS: -2 DEGREES
QRSD INTERVAL: 88 MS
QT INTERVAL: 372 MS
QTC INTERVAL: 426 MS
T WAVE AXIS: -27 DEGREES
VENTRICULAR RATE: 79 BPM

## 2022-01-10 PROCEDURE — 93010 ELECTROCARDIOGRAM REPORT: CPT | Performed by: INTERNAL MEDICINE

## 2022-01-26 ENCOUNTER — OFFICE VISIT (OUTPATIENT)
Dept: CARDIOLOGY CLINIC | Facility: CLINIC | Age: 82
End: 2022-01-26
Payer: MEDICARE

## 2022-01-26 VITALS
OXYGEN SATURATION: 100 % | BODY MASS INDEX: 22.96 KG/M2 | TEMPERATURE: 97.4 F | HEART RATE: 72 BPM | DIASTOLIC BLOOD PRESSURE: 76 MMHG | HEIGHT: 69 IN | WEIGHT: 155 LBS | SYSTOLIC BLOOD PRESSURE: 128 MMHG

## 2022-01-26 DIAGNOSIS — I48.0 PAROXYSMAL ATRIAL FIBRILLATION (HCC): ICD-10-CM

## 2022-01-26 DIAGNOSIS — I10 ESSENTIAL HYPERTENSION: ICD-10-CM

## 2022-01-26 DIAGNOSIS — I50.22 CHRONIC SYSTOLIC CONGESTIVE HEART FAILURE (HCC): Primary | ICD-10-CM

## 2022-01-26 DIAGNOSIS — N18.4 CKD (CHRONIC KIDNEY DISEASE) STAGE 4, GFR 15-29 ML/MIN (HCC): ICD-10-CM

## 2022-01-26 DIAGNOSIS — I31.3 PERICARDIAL EFFUSION: ICD-10-CM

## 2022-01-26 PROCEDURE — 99214 OFFICE O/P EST MOD 30 MIN: CPT | Performed by: INTERNAL MEDICINE

## 2022-01-26 RX ORDER — GLIMEPIRIDE 4 MG/1
4 TABLET ORAL DAILY
COMMUNITY
Start: 2022-01-25

## 2022-01-26 RX ORDER — SITAGLIPTIN 100 MG/1
100 TABLET, FILM COATED ORAL DAILY
COMMUNITY
Start: 2022-01-10

## 2022-01-26 NOTE — PROGRESS NOTES
Cardiology Followup    Marely Jon  [id number]  1940      Interval History: Marely Jon is a 78y o  year old male who is here for followup of CHF and atrial fibrillation  Since his last visit, he was admitted to [facility] with acute kidney injury  He was treated by Nephrology and medications were adjusted  He is now on torsemide 20 mg daily  He has no lower extremity edema at this time  He did have follow-up blood work done with his primary medical doctor with results unavailable at this time  He denies any shortness of breath, orthopnea, PND or chest pain  In the past, he was noted to have an irregular heart beat during a visit with Dr Abdulaziz Claudio  Holter monitor showed the presence of atrial fibrillation and he was started on Eliquis  He had no symptoms at the time  Previously, he was having dyspnea with minimal exertion and was hospitalized in January 2019  While hospitalized, he was noted to have renal failure, CHF with an EF of 40% and frequent PVCs  Stress test was abnormal with a fixed inferolateral wall defect without ischemia  There was a moderate sized pericardial effusion  Repeat echocardiogram in April 2021 showed no change in EF - remains 40-45%  He has persistent moderate-large sized effusion without tamponade  Past Medical History:   Diagnosis Date    [facility]     Chronic kidney disease     Coronary artery disease     Diabetes mellitus ([facility] )     Hyperlipidemia     Hypertension      Social History     Socioeconomic History    Marital status:       Spouse name: Not on file    Number of children: 1    Years of education: 15    Highest education level: 12th grade   Occupational History    Not on file   Tobacco Use    Smoking status: Former Smoker    Smokeless tobacco: Former User   Vaping Use    Vaping Use: Never used   Substance and Sexual Activity    Alcohol use: Not Currently Alcohol/week: 0 0 standard drinks     Comment: special occasions    Drug use: Not Currently    Sexual activity: Not on file   Other Topics Concern    Not on file   Social History Narrative    Not on file     Social Determinants of Health     Financial Resource Strain: Not on file   Food Insecurity: Not on file   Transportation Needs: Not on file   Physical Activity: Not on file   Stress: Not on file   Social Connections: Not on file   Intimate Partner Violence: Not on file   Housing Stability: Not on file      Family History   Problem Relation Age of Onset    Heart disease Mother     Diabetes Father     Vision loss Father     Cancer Sister     Diabetes Sister      Past Surgical History:   Procedure Laterality Date    EYE SURGERY      SKIN CANCER EXCISION      TONSILLECTOMY         Current Outpatient Medications:     acetaminophen (TYLENOL) 325 mg tablet, Take 2 tablets (650 mg total) by mouth every 6 (six) hours as needed for mild pain (Patient taking differently: Take 650 mg by mouth every 6 (six) hours as needed for mild pain ), Disp: , Rfl: 0    allopurinol (ZYLOPRIM) 100 mg tablet, Take 100 mg by mouth 2 (two) times a day, Disp: , Rfl:     ALPRAZolam (XANAX) 0 5 mg tablet, 0 5 mg daily at bedtime , Disp: , Rfl: 0    ascorbic acid (VITAMIN C) 500 MG tablet, Take 1 tablet (500 mg total) by mouth daily, Disp: , Rfl: 0    atorvastatin (LIPITOR) 40 mg tablet, Take 1 tablet (40 mg total) by mouth daily with dinner, Disp: 30 tablet, Rfl: 0    Blood Pressure Monitor NILTON, by Does not apply route daily, Disp: 1 Device, Rfl: 0    carvedilol (COREG) 6 25 mg tablet, Take 1 tablet (6 25 mg total) by mouth 2 (two) times a day with meals, Disp: 60 tablet, Rfl: 0    cholecalciferol (VITAMIN D3) 1,000 units tablet, Take 1 tablet (1,000 Units total) by mouth daily, Disp: 60 tablet, Rfl: 3    Eliquis 2 5 MG, TAKE ONE TABLET BY MOUTH TWICE A DAY, Disp: 60 tablet, Rfl: 11    glimepiride (AMARYL) 4 mg tablet, Take 4 mg by mouth in the morning, Disp: , Rfl:     isosorbide mononitrate (IMDUR) 30 mg 24 hr tablet, Take 1 tablet (30 mg total) by mouth daily, Disp: 30 tablet, Rfl: 0    Januvia 100 MG tablet, Take 100 mg by mouth daily  , Disp: , Rfl:     latanoprost (XALATAN) 0 005 % ophthalmic solution, 1 drop daily at bedtime, Disp: , Rfl:     timolol (TIMOPTIC) 0 5 % ophthalmic solution, Administer 1 drop to both eyes daily, Disp: , Rfl:     torsemide (DEMADEX) 20 mg tablet, Take 1 tablet (20 mg total) by mouth daily, Disp: , Rfl: 0    ZINC OXIDE PO, Take by mouth daily, Disp: , Rfl:   Allergies   Allergen Reactions    Elemental Sulfur     Sulfa Antibiotics          Review of Systems:  Review of Systems   Respiratory: Negative for shortness of breath  Cardiovascular: Negative for chest pain, palpitations and leg swelling  Musculoskeletal: Positive for arthralgias  All other systems reviewed and are negative  Physical Exam:  Vitals:    01/26/22 1515   BP: 128/76   BP Location: Right arm   Patient Position: Sitting   Cuff Size: Standard   Pulse: 72   Temp: (!) 97 4 °F (36 3 °C)   SpO2: 100%   Weight: 70 3 kg (155 lb)   Height: 5' 9" (1 753 m)     Physical Exam  Constitutional:       General: He is not in acute distress  Appearance: He is well-developed  He is not diaphoretic  HENT:      Head: Normocephalic and atraumatic  Eyes:      Conjunctiva/sclera: Conjunctivae normal       Pupils: Pupils are equal, round, and reactive to light  Neck:      Thyroid: No thyromegaly  Vascular: No JVD  Cardiovascular:      Rate and Rhythm: Normal rate  Rhythm regularly irregular  Heart sounds: Murmur heard  No friction rub  No gallop  Pulmonary:      Effort: Pulmonary effort is normal       Breath sounds: Normal breath sounds  Musculoskeletal:      Cervical back: Neck supple  Right lower leg: No edema  Left lower leg: No edema  Skin:     General: Skin is warm and dry        Findings: No erythema or rash  Neurological:      General: No focal deficit present  Mental Status: He is alert and oriented to person, place, and time  Cranial Nerves: No cranial nerve deficit  Psychiatric:         Mood and Affect: Mood normal          Behavior: Behavior normal          Thought Content: Thought content normal          Judgment: Judgment normal          Labs: reviewed    Imaging: No results found  ECG: Atrial fibrillation at 70 bpm    Discussion/Summary:  1  Chronic systolic congestive heart failure ([facility] )    2  Essential hypertension    3  Pericardial effusion    4  Paroxysmal atrial fibrillation ([facility])    5  CKD (chronic kidney disease) stage 4, GFR 15-29 ml/min ([facility])      - Continue torsemide 20 mg  Monitor ankles for edema and daily weights      - Continue Eliquis 2 5 mg twice a day  - Monitor daily weights  - Following up with nephrology for renal failure  - continue carvedilol 6 25 mg b i d   - Blood work results from Dr Holly Hardy

## 2022-04-19 ENCOUNTER — OFFICE VISIT (OUTPATIENT)
Dept: NEPHROLOGY | Facility: CLINIC | Age: 82
End: 2022-04-19
Payer: MEDICARE

## 2022-04-19 VITALS — BODY MASS INDEX: 24.14 KG/M2 | HEIGHT: 69 IN | WEIGHT: 163 LBS

## 2022-04-19 DIAGNOSIS — I12.9 TYPE 2 DIABETES MELLITUS WITH STAGE 4 CHRONIC KIDNEY DISEASE AND HYPERTENSION (HCC): Primary | ICD-10-CM

## 2022-04-19 DIAGNOSIS — I50.22 CHRONIC SYSTOLIC CONGESTIVE HEART FAILURE (HCC): ICD-10-CM

## 2022-04-19 DIAGNOSIS — I10 ESSENTIAL HYPERTENSION: ICD-10-CM

## 2022-04-19 DIAGNOSIS — E11.22 TYPE 2 DIABETES MELLITUS WITH STAGE 4 CHRONIC KIDNEY DISEASE AND HYPERTENSION (HCC): Primary | ICD-10-CM

## 2022-04-19 DIAGNOSIS — N18.4 TYPE 2 DIABETES MELLITUS WITH STAGE 4 CHRONIC KIDNEY DISEASE AND HYPERTENSION (HCC): Primary | ICD-10-CM

## 2022-04-19 DIAGNOSIS — E55.9 VITAMIN D DEFICIENCY: ICD-10-CM

## 2022-04-19 PROBLEM — N18.9 ACUTE KIDNEY INJURY SUPERIMPOSED ON CHRONIC KIDNEY DISEASE (HCC): Status: RESOLVED | Noted: 2019-04-05 | Resolved: 2022-04-19

## 2022-04-19 PROBLEM — N17.9 ACUTE KIDNEY INJURY SUPERIMPOSED ON CHRONIC KIDNEY DISEASE (HCC): Status: RESOLVED | Noted: 2019-04-05 | Resolved: 2022-04-19

## 2022-04-19 PROCEDURE — 99214 OFFICE O/P EST MOD 30 MIN: CPT | Performed by: INTERNAL MEDICINE

## 2022-04-19 NOTE — PROGRESS NOTES
NEPHROLOGY OFFICE VISIT   Yonatan Gonzalez 78 y o  male MRN: [id number]  4/19/2022    Reason for Visit: Chronic Kidney Disease    History of Present Illness (HPI):    I had the pleasure of seeing Consuelo Sam today in the renal clinic for the continued management of CKD  Since our last visit, he was admitted to the hospital at [address] [place] [address] in January of this year for COVID-19, low blood pressure  He has suffered an episode of acute kidney injury which improved with volume resuscitation holding some of his antihypertensives and diuretics  His most recent blood work showed a creatinine of 2 1 mg/dL in early March in returned back to his baseline  His torsemide was recently increased from 20 mg to 40 mg   Prior to this he had been on 60 mg twice a day  Some of his blood pressure medications have been discontinued and his blood pressure is currently normotensive  Recently did have a high salt intake over the Easter holiday  But has swelling prior to this had been improving  ASSESSMENT and PLAN:    1 ) Chronic kidney disease stage IV  -presumed etiology is most likely diabetic nephropathy, with possible component of hypertensive nephrosclerosis and arterial sclerosis   May even have a component of renal vascular disease  -urine protein creatinine ratio 0 5  -baseline creatinine 2 5-3 mg/dL,   But more recently has been below baseline ranging anywhere from 2 1-2 2 mg/dL  -I have discussed different dialysis modalities with him  -Chronic Kidney Disease education/Kid[facility]:  Attended  -no urgent indication for dialysis at this time  -avoid NSAIDs  -renal ultrasound shows asymmetric kidneys   His right kidney measures 8 5 cm and his left kidney measures 10 9 cm   No evidence of hydronephrosis    -suffered an episode of acute kidney injury in January 2020 to in setting of hypotension and COVID-19 with a creatinine was greater than 4 mg/dL and improved  -diabetic and blood pressure control  -renal function currently stable with a creatinine of 2 1 mg/dL  -no overt uremic symptoms no urgent indication for renal replacement therapy     2  ) Chronic systolic congestive heart failure  -ejection fraction 40%  -follows with Cardiology- Dr Luisa Valencia  -premature ventricular contraction, pericardial effusion, was moderate on the last echocardiogram  -EDW around high 140s lbs, above his dry weight currently on torsemide which was recently increased to 40 mg, continue monitor weight and swelling if no improvement can increase torsemide further  -daily weights  -low 2 g sodium diet  -torsemide 40 mg daily     3  ) Hypertension  -blood pressure at goal in the office  -aim for less than 130/80  -torsemide 40 mg daily  -low 2 g sodium diet  -blood pressure at target     4 )  Diabetes mellitus type 2  -hemoglobin A1c: 7 1 (A1C values may be falsely elevated or decreased in those with CKD, assay method should be certified by [employer])  Target A1C < 7  -current medications:  Glimepiride, I would recommend the addition of SGLT2 inhibitors setting of Chronic Kidney Disease heart failure  -proteinuria:  Yes, urine protein creatinine ratio 0 4  -retinopathy:  No  -neuropathy:  No  -please follow with an ophthalmologist and podiatrist every year  -continued self monitoring of blood glucose at home  -Treatment Management in CKD Recommendations:   · Metformin:  Avoid if creatinine clearance is less than 30 cc/min (concern for lactic acidosis)  · Sodium-Glucose Cotransporter-2 (SGLT2) Inhibitors:  May see an acute drop in the eGFR initially when starting the medication but then a stabilization of the renal function, with slower loss of renal function as compared to placebo   Relative risk of end-stage renal disease, doubling of serum creatinine or death from renal causes were also found to be lower as compared to placebo  ([facility])    Would recommend starting at 100 mg daily, I would avoid use in patients with eGFR < 30 cc/min   If no contraindications exist I would recommend this medication added to the diabetic regiment  · Sulfonylureas:  Preferred short-acting (glipizide, glimepiride, repaglinide), relatively safe in patients with non dialysis CKD  · Thiazolidinedones/Alpha-Gluosidase inhibitors/Dipeptidyl peptidase-4 inhibitors:  Generally not considered first-line agents in CKD (limited data in long-term safety and efficacy)  · Insulin:  Starting dose may need to be lower than what would ordinarily be used, as there is a decrease metabolism of insulin (no dose adjustment if the GFR > 50 mL/min, dose should be reduced to 75% of baseline if GFR 10-50 mL/min, and 50% baseline if GFR < 10 mL/min)        PATIENT INSTRUCTIONS:    Patient Instructions   1 )  Low 2 g sodium diet    2 )  Monitor weights at home    3 )  Avoid NSAIDs (ibuprofen, Motrin, Advil, Aleve, naproxen)    4 )  Monitor blood pressure at home, call if blood pressure greater than 150/90 persistently    5 ) I will plan to discuss all results including blood work, and/or imaging at our next visit, unless there is an urgent indication, in which case I will call you earlier  If you have any questions or concerns about your results, please feel free to call our office  6 ) Check your weights and swelling, if not better after 1-2 weeks can increase Torsemide to 60 mg daily          Orders Placed This Encounter   Procedures    Comprehensive metabolic panel     This is a patient instruction: Patient fasting for 8 hours or longer recommended       Standing Status:   Future     Standing Expiration Date:   4/19/2023    Cystatin C With eGFR     Standing Status:   Future     Standing Expiration Date:   4/19/2023    CBC     Standing Status:   Future     Standing Expiration Date:   4/19/2023    PTH, intact     Standing Status:   Future     Standing Expiration Date:   4/19/2023    Phosphorus     Standing Status:   Future     Standing Expiration Date:   4/19/2023   Iker Rajput Microalbumin / creatinine urine ratio     Standing Status:   Future     Standing Expiration Date:   4/19/2023       OBJECTIVE:  Current Weight: Weight - Scale: 73 9 kg (163 lb)  Vitals:    04/19/22 1518   Weight: 73 9 kg (163 lb)   Height: 5' 9" (1 753 m)    Body mass index is 24 07 kg/m²  REVIEW OF SYSTEMS:    Review of Systems   Constitutional: Positive for unexpected weight change  Negative for activity change and fever  Respiratory: Negative for cough, chest tightness, shortness of breath and wheezing  Cardiovascular: Positive for leg swelling  Negative for chest pain  Gastrointestinal: Negative for abdominal pain, diarrhea, nausea and vomiting  Endocrine: Negative for polyuria  Genitourinary: Negative for difficulty urinating, dysuria, flank pain, frequency and urgency  Skin: Negative for rash  Neurological: Negative for dizziness, syncope, light-headedness and headaches  PHYSICAL EXAM:      Physical Exam  Vitals and nursing note reviewed  Exam conducted with a chaperone present  Constitutional:       General: He is not in acute distress  Appearance: He is well-developed  HENT:      Head: Normocephalic and atraumatic  Eyes:      General: No scleral icterus  Conjunctiva/sclera: Conjunctivae normal       Pupils: Pupils are equal, round, and reactive to light  Cardiovascular:      Rate and Rhythm: Normal rate and regular rhythm  Heart sounds: S1 normal and S2 normal  No murmur heard  No friction rub  No gallop  Pulmonary:      Effort: Pulmonary effort is normal  No respiratory distress  Breath sounds: Normal breath sounds  No wheezing or rales  Abdominal:      General: Bowel sounds are normal       Palpations: Abdomen is soft  Tenderness: There is no abdominal tenderness  There is no rebound  Musculoskeletal:         General: Normal range of motion  Cervical back: Normal range of motion and neck supple  Right lower leg: Edema present  Left lower leg: Edema present  Skin:     Findings: No rash  Neurological:      Mental Status: He is alert and oriented to person, place, and time     Psychiatric:         Behavior: Behavior normal          Medications:    Current Outpatient Medications:     allopurinol (ZYLOPRIM) 100 mg tablet, Take 100 mg by mouth 2 (two) times a day, Disp: , Rfl:     ALPRAZolam (XANAX) 0 5 mg tablet, 0 5 mg daily at bedtime , Disp: , Rfl: 0    ascorbic acid (VITAMIN C) 500 MG tablet, Take 1 tablet (500 mg total) by mouth daily, Disp: , Rfl: 0    atorvastatin (LIPITOR) 40 mg tablet, Take 1 tablet (40 mg total) by mouth daily with dinner, Disp: 30 tablet, Rfl: 0    carvedilol (COREG) 6 25 mg tablet, Take 1 tablet (6 25 mg total) by mouth 2 (two) times a day with meals, Disp: 60 tablet, Rfl: 0    cholecalciferol (VITAMIN D3) 1,000 units tablet, Take 1 tablet (1,000 Units total) by mouth daily, Disp: 60 tablet, Rfl: 3    Eliquis 2 5 MG, TAKE ONE TABLET BY MOUTH TWICE A DAY, Disp: 60 tablet, Rfl: 11    glimepiride (AMARYL) 4 mg tablet, Take 4 mg by mouth in the morning, Disp: , Rfl:     isosorbide mononitrate (IMDUR) 30 mg 24 hr tablet, Take 1 tablet (30 mg total) by mouth daily, Disp: 30 tablet, Rfl: 0    Januvia 100 MG tablet, Take 100 mg by mouth daily  , Disp: , Rfl:     latanoprost (XALATAN) 0 005 % ophthalmic solution, 1 drop daily at bedtime, Disp: , Rfl:     timolol (TIMOPTIC) 0 5 % ophthalmic solution, Administer 1 drop to both eyes daily, Disp: , Rfl:     torsemide (DEMADEX) 20 mg tablet, Take 1 tablet (20 mg total) by mouth daily (Patient taking differently: Take 40 mg by mouth daily  ), Disp: , Rfl: 0    ZINC OXIDE PO, Take by mouth daily, Disp: , Rfl:     acetaminophen (TYLENOL) 325 mg tablet, Take 2 tablets (650 mg total) by mouth every 6 (six) hours as needed for mild pain (Patient taking differently: Take 650 mg by mouth every 6 (six) hours as needed for mild pain ), Disp: , Rfl: 0    Blood Pressure Monitor NILTON, by Does not apply route daily, Disp: 1 Device, Rfl: 0    Laboratory Results:        Invalid input(s): ALBUMIN    Results for orders placed or performed during the hospital encounter of 01/07/22   COVID/FLU/RSV - 2 hour TAT    Specimen: Nasopharyngeal Swab; Nares   Result Value Ref Range    SARS-CoV-2 Positive (A) Negative    INFLUENZA A PCR Negative Negative    INFLUENZA B PCR Negative Negative    RSV PCR Negative Negative   CBC and differential   Result Value Ref Range    WBC 7 99 4 31 - 10 16 Thousand/uL    RBC 4 25 3 88 - 5 62 Million/uL    Hemoglobin 14 2 12 0 - 17 0 g/dL    Hematocrit 43 1 36 5 - 49 3 %     (H) 82 - 98 fL    MCH 33 4 26 8 - 34 3 pg    MCHC 32 9 31 4 - 37 4 g/dL    RDW 14 7 11 6 - 15 1 %    MPV 10 0 8 9 - 12 7 fL    Platelets 253 (L) 501 - 390 Thousands/uL    nRBC 0 /100 WBCs    Neutrophils Relative 79 (H) 43 - 75 %    Immat GRANS % 1 0 - 2 %    Lymphocytes Relative 8 (L) 14 - 44 %    Monocytes Relative 11 4 - 12 %    Eosinophils Relative 1 0 - 6 %    Basophils Relative 0 0 - 1 %    Neutrophils Absolute 6 34 1 85 - 7 62 Thousands/µL    Immature Grans Absolute 0 07 0 00 - 0 20 Thousand/uL    Lymphocytes Absolute 0 62 0 60 - 4 47 Thousands/µL    Monocytes Absolute 0 88 0 17 - 1 22 Thousand/µL    Eosinophils Absolute 0 06 0 00 - 0 61 Thousand/µL    Basophils Absolute 0 02 0 00 - 0 10 Thousands/µL   Comprehensive metabolic panel   Result Value Ref Range    Sodium 126 (L) 136 - 145 mmol/L    Potassium 5 7 (H) 3 5 - 5 3 mmol/L    Chloride 89 (L) 100 - 108 mmol/L    CO2 24 21 - 32 mmol/L    ANION GAP 13 4 - 13 mmol/L     (H) 5 - 25 mg/dL    Creatinine 4 30 (H) 0 60 - 1 30 mg/dL    Glucose 236 (H) 65 - 140 mg/dL    Calcium 8 4 8 3 - 10 1 mg/dL    AST 21 5 - 45 U/L    ALT 43 12 - 78 U/L    Alkaline Phosphatase 145 (H) 46 - 116 U/L    Total Protein 7 6 6 4 - 8 2 g/dL    Albumin 3 6 3 5 - 5 0 g/dL    Total Bilirubin 0 88 0 20 - 1 00 mg/dL    eGFR 11 ml/min/1 73sq m   Magnesium Result Value Ref Range    Magnesium 2 3 1 6 - 2 6 mg/dL   UA (URINE) with reflex to Scope   Result Value Ref Range    Color, UA Yellow     Clarity, UA Slightly Cloudy     Specific [place], UA 1 015 1 000 - 1 030    pH, UA 5 5 5 0, 5 5, 6 0, 6 5, 7 0, 7 5, 8 0, 8 5, 9 0    Leukocytes, UA Moderate (A) Negative    Nitrite, UA Negative Negative    Protein, UA Negative Negative mg/dl    Glucose,  (1/10%) (A) Negative mg/dl    Ketones, UA Negative Negative mg/dl    Urobilinogen, UA 0 2 0 2, 1 0 E U /dl E U /dl    Bilirubin, UA Negative Negative    Blood, UA Trace-Intact (A) Negative   Protime-INR   Result Value Ref Range    Protime 17 3 (H) 11 6 - 14 5 seconds    INR 1 45 (H) 0 84 - 1 19   APTT   Result Value Ref Range    PTT 43 (H) 23 - 37 seconds   Urine Microscopic   Result Value Ref Range    RBC, UA 1-2 None Seen, 0-1, 1-2, 2-4, 0-5 /hpf    WBC, UA 1-2 None Seen, 0-1, 1-2, 0-5, 2-4 /hpf    Epithelial Cells Occasional None Seen, Occasional /hpf    Bacteria, UA None Seen None Seen, Occasional /hpf   Basic metabolic panel   Result Value Ref Range    Sodium 132 (L) 136 - 145 mmol/L    Potassium 4 6 3 5 - 5 3 mmol/L    Chloride 97 (L) 100 - 108 mmol/L    CO2 23 21 - 32 mmol/L    ANION GAP 12 4 - 13 mmol/L     (H) 5 - 25 mg/dL    Creatinine 3 75 (H) 0 60 - 1 30 mg/dL    Glucose 75 65 - 140 mg/dL    Calcium 7 9 (L) 8 3 - 10 1 mg/dL    eGFR 14 ml/min/1 73sq m   CBC and differential   Result Value Ref Range    WBC 7 44 4 31 - 10 16 Thousand/uL    RBC 4 03 3 88 - 5 62 Million/uL    Hemoglobin 13 6 12 0 - 17 0 g/dL    Hematocrit 41 1 36 5 - 49 3 %     (H) 82 - 98 fL    MCH 33 7 26 8 - 34 3 pg    MCHC 33 1 31 4 - 37 4 g/dL    RDW 14 6 11 6 - 15 1 %    MPV 9 6 8 9 - 12 7 fL    Platelets 751 (L) 530 - 390 Thousands/uL    nRBC 0 /100 WBCs    Neutrophils Relative 72 43 - 75 %    Immat GRANS % 1 0 - 2 %    Lymphocytes Relative 12 (L) 14 - 44 %    Monocytes Relative 13 (H) 4 - 12 %    Eosinophils Relative 2 0 - 6 %    Basophils Relative 0 0 - 1 %    Neutrophils Absolute 5 41 1 85 - 7 62 Thousands/µL    Immature Grans Absolute 0 04 0 00 - 0 20 Thousand/uL    Lymphocytes Absolute 0 88 0 60 - 4 47 Thousands/µL    Monocytes Absolute 0 95 0 17 - 1 22 Thousand/µL    Eosinophils Absolute 0 14 0 00 - 0 61 Thousand/µL    Basophils Absolute 0 02 0 00 - 0 10 Thousands/µL   Procalcitonin with AM Reflex   Result Value Ref Range    Procalcitonin 0 26 (H) <=0 25 ng/ml   Procalcitonin Reflex   Result Value Ref Range    Procalcitonin 0 10 <=0 25 ng/ml   Basic metabolic panel   Result Value Ref Range    Sodium 134 (L) 136 - 145 mmol/L    Potassium 4 8 3 5 - 5 3 mmol/L    Chloride 103 100 - 108 mmol/L    CO2 21 21 - 32 mmol/L    ANION GAP 10 4 - 13 mmol/L    BUN 97 (H) 5 - 25 mg/dL    Creatinine 3 28 (H) 0 60 - 1 30 mg/dL    Glucose 62 (L) 65 - 140 mg/dL    Calcium 7 5 (L) 8 3 - 10 1 mg/dL    eGFR 16 ml/min/1 73sq m   C-reactive protein   Result Value Ref Range    CRP 13 9 (H) <3 0 mg/L   ECG 12 lead   Result Value Ref Range    Ventricular Rate 79 BPM    Atrial Rate  BPM    ME Interval  ms    QRSD Interval 88 ms    QT Interval 372 ms    QTC Interval 426 ms    P Axis  degrees    QRS Axis -2 degrees    T Wave Axis -27 degrees   Fingerstick Glucose (POCT)   Result Value Ref Range    POC Glucose 140 65 - 140 mg/dl   Fingerstick Glucose (POCT)   Result Value Ref Range    POC Glucose 98 65 - 140 mg/dl   Fingerstick Glucose (POCT)   Result Value Ref Range    POC Glucose 195 (H) 65 - 140 mg/dl   Fingerstick Glucose (POCT)   Result Value Ref Range    POC Glucose 236 (H) 65 - 140 mg/dl   Fingerstick Glucose (POCT)   Result Value Ref Range    POC Glucose 200 (H) 65 - 140 mg/dl   Fingerstick Glucose (POCT)   Result Value Ref Range    POC Glucose 81 65 - 140 mg/dl   Fingerstick Glucose (POCT)   Result Value Ref Range    POC Glucose 55 (L) 65 - 140 mg/dl   Fingerstick Glucose (POCT)   Result Value Ref Range    POC Glucose 209 (H) 65 - 140 mg/dl

## 2022-04-19 NOTE — PATIENT INSTRUCTIONS
1  )  Low 2 g sodium diet    2 )  Monitor weights at home    3 )  Avoid NSAIDs (ibuprofen, Motrin, Advil, Aleve, naproxen)    4 )  Monitor blood pressure at home, call if blood pressure greater than 150/90 persistently    5 ) I will plan to discuss all results including blood work, and/or imaging at our next visit, unless there is an urgent indication, in which case I will call you earlier  If you have any questions or concerns about your results, please feel free to call our office      6 ) Check your weights and swelling, if not better after 1-2 weeks can increase Torsemide to 60 mg daily

## 2022-07-13 DIAGNOSIS — I48.0 PAROXYSMAL ATRIAL FIBRILLATION (HCC): ICD-10-CM

## 2022-07-15 RX ORDER — APIXABAN 2.5 MG/1
TABLET, FILM COATED ORAL
Qty: 60 TABLET | Refills: 11 | Status: SHIPPED | OUTPATIENT
Start: 2022-07-15

## 2022-07-28 ENCOUNTER — OFFICE VISIT (OUTPATIENT)
Dept: CARDIOLOGY CLINIC | Facility: CLINIC | Age: 82
End: 2022-07-28
Payer: MEDICARE

## 2022-07-28 VITALS
WEIGHT: 158.9 LBS | SYSTOLIC BLOOD PRESSURE: 130 MMHG | BODY MASS INDEX: 23.47 KG/M2 | HEART RATE: 86 BPM | OXYGEN SATURATION: 99 % | DIASTOLIC BLOOD PRESSURE: 70 MMHG

## 2022-07-28 DIAGNOSIS — I48.0 PAROXYSMAL ATRIAL FIBRILLATION (HCC): Primary | ICD-10-CM

## 2022-07-28 DIAGNOSIS — E11.9 CONTROLLED TYPE 2 DIABETES MELLITUS WITHOUT COMPLICATION, WITHOUT LONG-TERM CURRENT USE OF INSULIN (HCC): ICD-10-CM

## 2022-07-28 DIAGNOSIS — I10 ESSENTIAL HYPERTENSION: ICD-10-CM

## 2022-07-28 DIAGNOSIS — I31.39 PERICARDIAL EFFUSION: ICD-10-CM

## 2022-07-28 DIAGNOSIS — I50.22 CHRONIC SYSTOLIC CONGESTIVE HEART FAILURE (HCC): ICD-10-CM

## 2022-07-28 PROCEDURE — 93000 ELECTROCARDIOGRAM COMPLETE: CPT | Performed by: INTERNAL MEDICINE

## 2022-07-28 PROCEDURE — 99214 OFFICE O/P EST MOD 30 MIN: CPT | Performed by: INTERNAL MEDICINE

## 2022-07-28 NOTE — PROGRESS NOTES
Cardiology Followup    Maria Eugenia Quispe  [id number]  1940      Interval History: Maria Eugenia Quispe is a 78y o  year old male who is here for followup of CHF and atrial fibrillation  Since his last visit, he has been feeling well without any shortness of breath or chest pain  He does have bilateral lower extremity edema which started within the past 3 months  He has been less active recently because of the summer and less work  Currently, he is using torsemide 40 mg daily  He denies any shortness of breath, orthopnea or paroxysmal nocturnal dyspnea  He was seen by nephrologist and had repeat blood work done which showed improvement in his creatinine  He has a history of chronic combined systolic and diastolic congestive heart failure with his last ejection fraction measured of 40%  He had a stress test done which showed a fixed inferolateral wall defect without ischemia  He has a moderate-sized pericardial effusion which is chronic without tamponade  His last echocardiogram was done in April 2021  In addition, he has chronic atrial fibrillation and is on Eliquis therapy  He has had no significant bleeding in the past        Past Medical History:   Diagnosis Date    Atrial fibrillation ([facility])     Chronic kidney disease     Coronary artery disease     Diabetes mellitus ([facility] [address] )     Hyperlipidemia     Hypertension      Social History     Socioeconomic History    Marital status:       Spouse name: Not on file    Number of children: 1    Years of education: 15    Highest education level: 12th grade   Occupational History    Not on file   Tobacco Use    Smoking status: Former Smoker    Smokeless tobacco: Former User   Vaping Use    Vaping Use: Never used   Substance and Sexual Activity    Alcohol use: Not Currently     Alcohol/week: 0 0 standard drinks     Comment: special occasions    Drug use: Not Currently    Sexual activity: Not on file   Other Topics Concern    Not on file   Social History Narrative    Not on file     Social Determinants of Health     Financial Resource Strain: Not on file   Food Insecurity: Not on file   Transportation Needs: Not on file   Physical Activity: Not on file   Stress: Not on file   Social Connections: Not on file   Intimate Partner Violence: Not on file   Housing Stability: Not on file      Family History   Problem Relation Age of Onset    Heart disease Mother     Diabetes Father     Vision loss Father     Cancer Sister     Diabetes Sister      Past Surgical History:   Procedure Laterality Date    EYE SURGERY      SKIN CANCER EXCISION      TONSILLECTOMY         Current Outpatient Medications:     acetaminophen (TYLENOL) 325 mg tablet, Take 2 tablets (650 mg total) by mouth every 6 (six) hours as needed for mild pain, Disp: , Rfl: 0    allopurinol (ZYLOPRIM) 100 mg tablet, Take 100 mg by mouth 2 (two) times a day, Disp: , Rfl:     ALPRAZolam (XANAX) 0 5 mg tablet, 0 5 mg daily at bedtime , Disp: , Rfl: 0    ascorbic acid (VITAMIN C) 500 MG tablet, Take 1 tablet (500 mg total) by mouth daily, Disp: , Rfl: 0    atorvastatin (LIPITOR) 40 mg tablet, Take 1 tablet (40 mg total) by mouth daily with dinner, Disp: 30 tablet, Rfl: 0    Blood Pressure Monitor NILTON, by Does not apply route daily, Disp: 1 Device, Rfl: 0    carvedilol (COREG) 6 25 mg tablet, Take 1 tablet (6 25 mg total) by mouth 2 (two) times a day with meals, Disp: 60 tablet, Rfl: 0    cholecalciferol (VITAMIN D3) 1,000 units tablet, Take 1 tablet (1,000 Units total) by mouth daily, Disp: 60 tablet, Rfl: 3    Eliquis 2 5 MG, TAKE ONE TABLET BY MOUTH TWICE A DAY, Disp: 60 tablet, Rfl: 11    glimepiride (AMARYL) 4 mg tablet, Take 4 mg by mouth in the morning, Disp: , Rfl:     isosorbide mononitrate (IMDUR) 30 mg 24 hr tablet, Take 1 tablet (30 mg total) by mouth daily, Disp: 30 tablet, Rfl: 0    Januvia 100 MG tablet, Take 100 mg by mouth daily  , Disp: , Rfl:     latanoprost (XALATAN) 0 005 % ophthalmic solution, 1 drop daily at bedtime, Disp: , Rfl:     timolol (TIMOPTIC) 0 5 % ophthalmic solution, Administer 1 drop to both eyes daily, Disp: , Rfl:     torsemide (DEMADEX) 20 mg tablet, Take 1 tablet (20 mg total) by mouth daily (Patient taking differently: Take 40 mg by mouth daily), Disp: , Rfl: 0    ZINC OXIDE PO, Take by mouth daily, Disp: , Rfl:   Allergies   Allergen Reactions    Elemental Sulfur     Sulfa Antibiotics          Review of Systems:  Review of Systems   Respiratory: Negative for shortness of breath  Cardiovascular: Positive for leg swelling  Negative for chest pain and palpitations  Musculoskeletal: Positive for arthralgias  All other systems reviewed and are negative  Physical Exam:  Vitals:    07/28/22 1337   BP: 130/70   BP Location: Left arm   Patient Position: Sitting   Cuff Size: Child   Pulse: 86   SpO2: 99%   Weight: 72 1 kg (158 lb 14 4 oz)     Physical Exam  Constitutional:       General: He is not in acute distress  Appearance: He is well-developed  He is not diaphoretic  HENT:      Head: Normocephalic and atraumatic  Eyes:      Conjunctiva/sclera: Conjunctivae normal       Pupils: Pupils are equal, round, and reactive to light  Neck:      Thyroid: No thyromegaly  Vascular: No JVD  Cardiovascular:      Rate and Rhythm: Normal rate  Rhythm irregular  Heart sounds: Normal heart sounds  No murmur heard  No friction rub  No gallop  Pulmonary:      Effort: Pulmonary effort is normal       Breath sounds: Normal breath sounds  Musculoskeletal:      Cervical back: Neck supple  Right lower leg: Edema present  Left lower leg: Edema present  Skin:     General: Skin is warm and dry  Findings: No erythema or rash  Neurological:      General: No focal deficit present  Mental Status: He is alert and oriented to person, place, and time  Cranial Nerves: No cranial nerve deficit     Psychiatric: Mood and Affect: Mood normal          Behavior: Behavior normal          Thought Content: Thought content normal          Judgment: Judgment normal          Labs: reviewed    Imaging: No results found  ECG: Atrial fibrillation at 85 bpm    Discussion/Summary:  1  Paroxysmal atrial fibrillation (HCC)    2  Chronic systolic congestive heart failure ([address] )    3  Essential hypertension    4  Pericardial effusion    5  Controlled type 2 diabetes mellitus without complication, without long-term current use of insulin ([facility] [address] )      - patient with bilateral lower extremity edema which is increased from previous  Recommend he increased torsemide to 40 mg in a m  And 20 mg in p m  Until edema improves  If no improvement, echocardiogram will be repeated to reassess pericardial effusion  Will follow-up in 1-2 months for reassessment     Monitor daily weight     - Continue Eliquis 2 5 mg twice a day  - Following up with nephrology for renal failure  - continue carvedilol 6 25 mg b i d   - Blood work results from Dr Holly Hardy was reviewed

## 2022-08-02 ENCOUNTER — OFFICE VISIT (OUTPATIENT)
Dept: NEPHROLOGY | Facility: CLINIC | Age: 82
End: 2022-08-02
Payer: MEDICARE

## 2022-08-02 VITALS
HEIGHT: 69 IN | DIASTOLIC BLOOD PRESSURE: 60 MMHG | BODY MASS INDEX: 22.96 KG/M2 | SYSTOLIC BLOOD PRESSURE: 108 MMHG | WEIGHT: 155 LBS

## 2022-08-02 DIAGNOSIS — N18.4 TYPE 2 DIABETES MELLITUS WITH STAGE 4 CHRONIC KIDNEY DISEASE AND HYPERTENSION (HCC): ICD-10-CM

## 2022-08-02 DIAGNOSIS — E55.9 VITAMIN D DEFICIENCY: ICD-10-CM

## 2022-08-02 DIAGNOSIS — I12.9 TYPE 2 DIABETES MELLITUS WITH STAGE 4 CHRONIC KIDNEY DISEASE AND HYPERTENSION (HCC): ICD-10-CM

## 2022-08-02 DIAGNOSIS — I50.22 CHRONIC SYSTOLIC CONGESTIVE HEART FAILURE (HCC): ICD-10-CM

## 2022-08-02 DIAGNOSIS — E11.22 TYPE 2 DIABETES MELLITUS WITH STAGE 4 CHRONIC KIDNEY DISEASE AND HYPERTENSION (HCC): ICD-10-CM

## 2022-08-02 DIAGNOSIS — N18.4 CKD (CHRONIC KIDNEY DISEASE) STAGE 4, GFR 15-29 ML/MIN (HCC): Primary | ICD-10-CM

## 2022-08-02 PROCEDURE — 99214 OFFICE O/P EST MOD 30 MIN: CPT | Performed by: INTERNAL MEDICINE

## 2022-08-02 RX ORDER — METOLAZONE 5 MG/1
5 TABLET ORAL AS NEEDED
Qty: 90 TABLET | Refills: 3 | Status: SHIPPED | OUTPATIENT
Start: 2022-08-02

## 2022-08-02 NOTE — PROGRESS NOTES
NEPHROLOGY OFFICE VISIT   Melissa Albright 78 y o  male MRN: [id number]  8/2/2022    Reason for Visit:  Chronic kidney disease    History of Present Illness (HPI):    I had the pleasure of seeing Rusty Srivastava today in the renal clinic for the continued management of chronic kidney disease  Since our last visit, there has been no ER visits or hospitilizations  He currently has no complaints at this time and is feeling well  Patient denies any chest pain, no active shortness of breath but gets short of breath with exertion and worsening swelling  The last blood work was done on end of June, which we have reviewed together  At her last visit I had recommended that the swelling does not better to increase the torsemide to 60 mg  He was seen by Cardiology last week where the torsemide was increased to 60 mg  They report no changes in his swelling or weights  His weight is down from our last visit  I recommended containing torsemide 60 mg daily, along with utilizing metolazone 5 mg twice a week for this week and then dropping it down to once a week until the swelling and edema resolved  Will have him repeat BMP in 1 week    His creatinine is currently below baseline at 1 9 mg/dL which is likely reflection of volume overload  Will need to maintain a higher creatinine closer to his baseline to maintain close to euvolemic status      ASSESSMENT and PLAN:    1 ) Chronic kidney disease stage IV  -presumed etiology is most likely diabetic nephropathy, with possible component of hypertensive nephrosclerosis and arterial sclerosis   May even have a component of renal vascular disease  -urine protein creatinine ratio 0 5  -baseline creatinine 2 5-3 mg/dL, currently below baseline which is likely reflection of volume overload  -I have discussed different dialysis modalities with him  -Chronic Kidney Disease education/K[facility]:  Attended  -no urgent indication for dialysis at this time  -avoid NSAIDs  -renal ultrasound shows asymmetric kidneys   His right kidney measures 8 5 cm and his left kidney measures 10 9 cm   No evidence of hydronephrosis  -suffered an episode of acute kidney injury in January 2020 to in setting of hypotension and COVID-19 with a creatinine was greater than 4 mg/dL and improved  -diabetic and blood pressure control  -creatinine currently at 1 9 mg/dL  -no overt uremic symptoms no urgent indication for renal replacement therapy  -continue torsemide 60 mg, metolazone as needed but plan for 2 doses this week followed by weekly until the swelling and edema resolved  -BMP in 1-2     2  ) Chronic systolic congestive heart failure  -ejection fraction 40%  -follows with Cardiology- Dr Anastasia Keita  -premature ventricular contraction, pericardial effusion, was moderate on the last echocardiogram  -EDW around high 140s lbs  -agree with increasing torsemide to 60 mg  -start metolazone 5 mg  -daily weights  -low 2 g sodium diet     3  ) Hypertension  -blood pressure at goal in the office  -aim for less than 130/80  -torsemide 60 mg daily, add metolazone 5 mg  -low 2 g sodium diet  -blood pressure at target     4 )  Diabetes mellitus type 2  -hemoglobin A1c: 7 1 (A1C values may be falsely elevated or decreased in those with CKD, assay method should be certified by [employer])   Target A1C < 7  -current medications:  Glimepiride, I would recommend the addition of SGLT2 inhibitors setting of Chronic Kidney Disease heart failure  -proteinuria:  Yes, urine protein creatinine ratio 0 4  -retinopathy:  No  -neuropathy:  No  -please follow with an ophthalmologist and podiatrist every year  -continued self monitoring of blood glucose at home  -Treatment Management in CKD Recommendations:   · Metformin:  Avoid if creatinine clearance is less than 30 cc/min (concern for lactic acidosis)  · Sodium-Glucose Cotransporter-2 (SGLT2) Inhibitors:  May see an acute drop in the eGFR initially when starting the medication but then a stabilization of the renal function, with slower loss of renal function as compared to placebo   Relative risk of end-stage renal disease, doubling of serum creatinine or death from renal causes were also found to be lower as compared to placebo  ([facility])   Would recommend starting at 100 mg daily, I would avoid use in patients with eGFR < 30 cc/min   If no contraindications exist I would recommend this medication added to the diabetic regiment  · Sulfonylureas:  Preferred short-acting (glipizide, glimepiride, repaglinide), relatively safe in patients with non dialysis CKD  · Thiazolidinedones/Alpha-Gluosidase inhibitors/Dipeptidyl peptidase-4 inhibitors:  Generally not considered first-line agents in CKD (limited data in long-term safety and efficacy)  · Insulin:  Starting dose may need to be lower than what would ordinarily be used, as there is a decrease metabolism of insulin (no dose adjustment if the GFR > 50 mL/min, dose should be reduced to 75% of baseline if GFR 10-50 mL/min, and 50% baseline if GFR < 10 mL/min)         PATIENT INSTRUCTIONS:    Patient Instructions   1 )  Low 2 g sodium diet    2 )  Monitor weights at home    3 )  Avoid NSAIDs (ibuprofen, Motrin, Advil, Aleve, naproxen)    4 )  Monitor blood pressure at home, call if blood pressure greater than 150/90 persistently    5 ) I will plan to discuss all results including blood work, and/or imaging at our next visit, unless there is an urgent indication, in which case I will call you earlier  If you have any questions or concerns about your results, please feel free to call our office      6 ) Continue Torsemide 60 gm daily    7 ) Take Metolazone 5 mg with Torsemide twice a week for this week, then drop to once a week, until weight and swelling improve, then can take as needed for weight gain          Orders Placed This Encounter   Procedures    Basic metabolic panel     This is a patient instruction: Patient fasting for 8 hours or longer recommended  Standing Status:   Future     Standing Expiration Date:   8/2/2023    Comprehensive metabolic panel     This is a patient instruction: Patient fasting for 8 hours or longer recommended  Standing Status:   Future     Standing Expiration Date:   8/2/2023       OBJECTIVE:  Current Weight: Weight - Scale: 70 3 kg (155 lb)  Vitals:    08/02/22 1255 08/02/22 1314   BP:  108/60   Weight: 70 3 kg (155 lb)    Height: 5' 9" (1 753 m)     Body mass index is 22 89 kg/m²  REVIEW OF SYSTEMS:    Review of Systems   Constitutional: Negative for activity change and fever  Respiratory: Negative for cough, chest tightness, shortness of breath and wheezing  Cardiovascular: Positive for leg swelling  Negative for chest pain  Gastrointestinal: Negative for abdominal pain, diarrhea, nausea and vomiting  Endocrine: Negative for polyuria  Genitourinary: Negative for difficulty urinating, dysuria, flank pain, frequency and urgency  Skin: Negative for rash  Neurological: Negative for dizziness, syncope, light-headedness and headaches  PHYSICAL EXAM:      Physical Exam  Vitals and nursing note reviewed  Constitutional:       General: He is not in acute distress  Appearance: He is well-developed  He is not diaphoretic  HENT:      Head: Normocephalic and atraumatic  Eyes:      General: No scleral icterus  Conjunctiva/sclera: Conjunctivae normal       Pupils: Pupils are equal, round, and reactive to light  Cardiovascular:      Rate and Rhythm: Normal rate and regular rhythm  Heart sounds: Normal heart sounds, S1 normal and S2 normal  No murmur heard  No friction rub  No gallop  Pulmonary:      Effort: Pulmonary effort is normal  No respiratory distress  Breath sounds: Normal breath sounds  No wheezing or rales  Chest:      Chest wall: No tenderness  Abdominal:      General: Bowel sounds are normal  There is no distension        Palpations: Abdomen is soft       Tenderness: There is no abdominal tenderness  There is no rebound  Musculoskeletal:         General: Normal range of motion  Cervical back: Normal range of motion and neck supple  Right lower leg: Edema present  Left lower leg: Edema present  Skin:     Findings: No rash  Neurological:      Mental Status: He is alert and oriented to person, place, and time     Psychiatric:         Behavior: Behavior normal          Medications:    Current Outpatient Medications:     allopurinol (ZYLOPRIM) 100 mg tablet, Take 100 mg by mouth 2 (two) times a day, Disp: , Rfl:     ALPRAZolam (XANAX) 0 5 mg tablet, 0 5 mg daily at bedtime , Disp: , Rfl: 0    ascorbic acid (VITAMIN C) 500 MG tablet, Take 1 tablet (500 mg total) by mouth daily, Disp: , Rfl: 0    atorvastatin (LIPITOR) 40 mg tablet, Take 1 tablet (40 mg total) by mouth daily with dinner, Disp: 30 tablet, Rfl: 0    carvedilol (COREG) 6 25 mg tablet, Take 1 tablet (6 25 mg total) by mouth 2 (two) times a day with meals, Disp: 60 tablet, Rfl: 0    cholecalciferol (VITAMIN D3) 1,000 units tablet, Take 1 tablet (1,000 Units total) by mouth daily, Disp: 60 tablet, Rfl: 3    Eliquis 2 5 MG, TAKE ONE TABLET BY MOUTH TWICE A DAY, Disp: 60 tablet, Rfl: 11    glimepiride (AMARYL) 4 mg tablet, Take 4 mg by mouth in the morning, Disp: , Rfl:     isosorbide mononitrate (IMDUR) 30 mg 24 hr tablet, Take 1 tablet (30 mg total) by mouth daily, Disp: 30 tablet, Rfl: 0    Januvia 100 MG tablet, Take 100 mg by mouth daily  , Disp: , Rfl:     latanoprost (XALATAN) 0 005 % ophthalmic solution, 1 drop daily at bedtime, Disp: , Rfl:     metolazone (ZAROXOLYN) 5 mg tablet, Take 1 tablet (5 mg total) by mouth if needed (for weight gain more then % lbs), Disp: 90 tablet, Rfl: 3    timolol (TIMOPTIC) 0 5 % ophthalmic solution, Administer 1 drop to both eyes daily, Disp: , Rfl:     torsemide (DEMADEX) 20 mg tablet, Take 1 tablet (20 mg total) by mouth daily (Patient taking differently: Take 60 mg by mouth daily), Disp: , Rfl: 0    acetaminophen (TYLENOL) 325 mg tablet, Take 2 tablets (650 mg total) by mouth every 6 (six) hours as needed for mild pain, Disp: , Rfl: 0    Blood Pressure Monitor NILTON, by Does not apply route daily, Disp: 1 Device, Rfl: 0    ZINC OXIDE PO, Take by mouth daily, Disp: , Rfl:     Laboratory Results:        Invalid input(s): ALBUMIN    Results for orders placed or performed during the hospital encounter of 01/07/22   COVID/FLU/RSV - 2 hour TAT    Specimen: Nasopharyngeal Swab; Nares   Result Value Ref Range    SARS-CoV-2 Positive (A) Negative    INFLUENZA A PCR Negative Negative    INFLUENZA B PCR Negative Negative    RSV PCR Negative Negative   CBC and differential   Result Value Ref Range    WBC 7 99 4 31 - 10 16 Thousand/uL    RBC 4 25 3 88 - 5 62 Million/uL    Hemoglobin 14 2 12 0 - 17 0 g/dL    Hematocrit 43 1 36 5 - 49 3 %     (H) 82 - 98 fL    MCH 33 4 26 8 - 34 3 pg    MCHC 32 9 31 4 - 37 4 g/dL    RDW 14 7 11 6 - 15 1 %    MPV 10 0 8 9 - 12 7 fL    Platelets 531 (L) 701 - 390 Thousands/uL    nRBC 0 /100 WBCs    Neutrophils Relative 79 (H) 43 - 75 %    Immat GRANS % 1 0 - 2 %    Lymphocytes Relative 8 (L) 14 - 44 %    Monocytes Relative 11 4 - 12 %    Eosinophils Relative 1 0 - 6 %    Basophils Relative 0 0 - 1 %    Neutrophils Absolute 6 34 1 85 - 7 62 Thousands/µL    Immature Grans Absolute 0 07 0 00 - 0 20 Thousand/uL    Lymphocytes Absolute 0 62 0 60 - 4 47 Thousands/µL    Monocytes Absolute 0 88 0 17 - 1 22 Thousand/µL    Eosinophils Absolute 0 06 0 00 - 0 61 Thousand/µL    Basophils Absolute 0 02 0 00 - 0 10 Thousands/µL   Comprehensive metabolic panel   Result Value Ref Range    Sodium 126 (L) 136 - 145 mmol/L    Potassium 5 7 (H) 3 5 - 5 3 mmol/L    Chloride 89 (L) 100 - 108 mmol/L    CO2 24 21 - 32 mmol/L    ANION GAP 13 4 - 13 mmol/L     (H) 5 - 25 mg/dL    Creatinine 4 30 (H) 0 60 - 1 30 mg/dL    Glucose 236 (H) 65 - 140 mg/dL    Calcium 8 4 8 3 - 10 1 mg/dL    AST 21 5 - 45 U/L    ALT 43 12 - 78 U/L    Alkaline Phosphatase 145 (H) 46 - 116 U/L    Total Protein 7 6 6 4 - 8 2 g/dL    Albumin 3 6 3 5 - 5 0 g/dL    Total Bilirubin 0 88 0 20 - 1 00 mg/dL    eGFR 11 ml/min/1 73sq m   Magnesium   Result Value Ref Range    Magnesium 2 3 1 6 - 2 6 mg/dL   UA (URINE) with reflex to Scope   Result Value Ref Range    Color, UA Yellow     Clarity, UA Slightly Cloudy     Specific [place], UA 1 015 1 000 - 1 030    pH, UA 5 5 5 0, 5 5, 6 0, 6 5, 7 0, 7 5, 8 0, 8 5, 9 0    Leukocytes, UA Moderate (A) Negative    Nitrite, UA Negative Negative    Protein, UA Negative Negative mg/dl    Glucose,  (1/10%) (A) Negative mg/dl    Ketones, UA Negative Negative mg/dl    Urobilinogen, UA 0 2 0 2, 1 0 E U /dl E U /dl    Bilirubin, UA Negative Negative    Occult Blood, UA Trace-Intact (A) Negative   Protime-INR   Result Value Ref Range    Protime 17 3 (H) 11 6 - 14 5 seconds    INR 1 45 (H) 0 84 - 1 19   APTT   Result Value Ref Range    PTT 43 (H) 23 - 37 seconds   Urine Microscopic   Result Value Ref Range    RBC, UA 1-2 None Seen, 0-1, 1-2, 2-4, 0-5 /hpf    WBC, UA 1-2 None Seen, 0-1, 1-2, 0-5, 2-4 /hpf    Epithelial Cells Occasional None Seen, Occasional /hpf    Bacteria, UA None Seen None Seen, Occasional /hpf   Basic metabolic panel   Result Value Ref Range    Sodium 132 (L) 136 - 145 mmol/L    Potassium 4 6 3 5 - 5 3 mmol/L    Chloride 97 (L) 100 - 108 mmol/L    CO2 23 21 - 32 mmol/L    ANION GAP 12 4 - 13 mmol/L     (H) 5 - 25 mg/dL    Creatinine 3 75 (H) 0 60 - 1 30 mg/dL    Glucose 75 65 - 140 mg/dL    Calcium 7 9 (L) 8 3 - 10 1 mg/dL    eGFR 14 ml/min/1 73sq m   CBC and differential   Result Value Ref Range    WBC 7 44 4 31 - 10 16 Thousand/uL    RBC 4 03 3 88 - 5 62 Million/uL    Hemoglobin 13 6 12 0 - 17 0 g/dL    Hematocrit 41 1 36 5 - 49 3 %     (H) 82 - 98 fL    MCH 33 7 26 8 - 34 3 pg    MCHC 33 1 31 4 - 37 4 g/dL    RDW 14 6 11 6 - 15 1 %    MPV 9 6 8 9 - 12 7 fL    Platelets 131 (L) 347 - 390 Thousands/uL    nRBC 0 /100 WBCs    Neutrophils Relative 72 43 - 75 %    Immat GRANS % 1 0 - 2 %    Lymphocytes Relative 12 (L) 14 - 44 %    Monocytes Relative 13 (H) 4 - 12 %    Eosinophils Relative 2 0 - 6 %    Basophils Relative 0 0 - 1 %    Neutrophils Absolute 5 41 1 85 - 7 62 Thousands/µL    Immature Grans Absolute 0 04 0 00 - 0 20 Thousand/uL    Lymphocytes Absolute 0 88 0 60 - 4 47 Thousands/µL    Monocytes Absolute 0 95 0 17 - 1 22 Thousand/µL    Eosinophils Absolute 0 14 0 00 - 0 61 Thousand/µL    Basophils Absolute 0 02 0 00 - 0 10 Thousands/µL   Procalcitonin with AM Reflex   Result Value Ref Range    Procalcitonin 0 26 (H) <=0 25 ng/ml   Procalcitonin Reflex   Result Value Ref Range    Procalcitonin 0 10 <=0 25 ng/ml   Basic metabolic panel   Result Value Ref Range    Sodium 134 (L) 136 - 145 mmol/L    Potassium 4 8 3 5 - 5 3 mmol/L    Chloride 103 100 - 108 mmol/L    CO2 21 21 - 32 mmol/L    ANION GAP 10 4 - 13 mmol/L    BUN 97 (H) 5 - 25 mg/dL    Creatinine 3 28 (H) 0 60 - 1 30 mg/dL    Glucose 62 (L) 65 - 140 mg/dL    Calcium 7 5 (L) 8 3 - 10 1 mg/dL    eGFR 16 ml/min/1 73sq m   C-reactive protein   Result Value Ref Range    CRP 13 9 (H) <3 0 mg/L   ECG 12 lead   Result Value Ref Range    Ventricular Rate 79 BPM    Atrial Rate  BPM    ND Interval  ms    QRSD Interval 88 ms    QT Interval 372 ms    QTC Interval 426 ms    P Axis  degrees    QRS Axis -2 degrees    T Wave Axis -27 degrees   Fingerstick Glucose (POCT)   Result Value Ref Range    POC Glucose 140 65 - 140 mg/dl   Fingerstick Glucose (POCT)   Result Value Ref Range    POC Glucose 98 65 - 140 mg/dl   Fingerstick Glucose (POCT)   Result Value Ref Range    POC Glucose 195 (H) 65 - 140 mg/dl   Fingerstick Glucose (POCT)   Result Value Ref Range    POC Glucose 236 (H) 65 - 140 mg/dl   Fingerstick Glucose (POCT)   Result Value Ref Range    POC Glucose 200 (H) 65 - 140 mg/dl   Fingerstick Glucose (POCT)   Result Value Ref Range    POC Glucose 81 65 - 140 mg/dl   Fingerstick Glucose (POCT)   Result Value Ref Range    POC Glucose 55 (L) 65 - 140 mg/dl   Fingerstick Glucose (POCT)   Result Value Ref Range    POC Glucose 209 (H) 65 - 140 mg/dl

## 2022-08-02 NOTE — PATIENT INSTRUCTIONS
1  )  Low 2 g sodium diet    2 )  Monitor weights at home    3 )  Avoid NSAIDs (ibuprofen, Motrin, Advil, Aleve, naproxen)    4 )  Monitor blood pressure at home, call if blood pressure greater than 150/90 persistently    5 ) I will plan to discuss all results including blood work, and/or imaging at our next visit, unless there is an urgent indication, in which case I will call you earlier  If you have any questions or concerns about your results, please feel free to call our office      6 ) Continue Torsemide 60 gm daily    7 ) Take Metolazone 5 mg with Torsemide twice a week for this week, then drop to once a week, until weight and swelling improve, then can take as needed for weight gain

## 2022-08-05 LAB
EXT GLUCOSE BLD: 219
EXTERNAL BUN: 61
EXTERNAL CALCIUM: 8.7
EXTERNAL CHLORIDE: 95
EXTERNAL CO2: 28
EXTERNAL CREATININE: 2.34
EXTERNAL EGFR: 27
EXTERNAL POTASSIUM: 4.7
EXTERNAL SODIUM: 136

## 2022-08-08 ENCOUNTER — TELEPHONE (OUTPATIENT)
Dept: NEPHROLOGY | Facility: CLINIC | Age: 82
End: 2022-08-08

## 2022-09-13 NOTE — PROGRESS NOTES
This is a new patient of supposed to see I just looked at his labs   Looks like his normal creatinine is anywhere around 2 3 slightly higher at 2 6 on blood work done yesterday  Kaylene Martinez am just going to order repeat BMP can get him do it prior to his visit on March 25th  24

## 2022-09-22 ENCOUNTER — OFFICE VISIT (OUTPATIENT)
Dept: CARDIOLOGY CLINIC | Facility: CLINIC | Age: 82
End: 2022-09-22
Payer: MEDICARE

## 2022-09-22 VITALS
HEIGHT: 69 IN | OXYGEN SATURATION: 100 % | BODY MASS INDEX: 22.36 KG/M2 | HEART RATE: 79 BPM | TEMPERATURE: 97.8 F | DIASTOLIC BLOOD PRESSURE: 62 MMHG | SYSTOLIC BLOOD PRESSURE: 114 MMHG | WEIGHT: 151 LBS

## 2022-09-22 DIAGNOSIS — N18.4 CKD (CHRONIC KIDNEY DISEASE) STAGE 4, GFR 15-29 ML/MIN (HCC): ICD-10-CM

## 2022-09-22 DIAGNOSIS — I50.22 CHRONIC SYSTOLIC CONGESTIVE HEART FAILURE (HCC): ICD-10-CM

## 2022-09-22 DIAGNOSIS — I10 ESSENTIAL HYPERTENSION: ICD-10-CM

## 2022-09-22 DIAGNOSIS — I48.21 PERMANENT ATRIAL FIBRILLATION (HCC): Primary | ICD-10-CM

## 2022-09-22 DIAGNOSIS — E11.9 CONTROLLED TYPE 2 DIABETES MELLITUS WITHOUT COMPLICATION, WITHOUT LONG-TERM CURRENT USE OF INSULIN (HCC): ICD-10-CM

## 2022-09-22 DIAGNOSIS — I31.39 PERICARDIAL EFFUSION: ICD-10-CM

## 2022-09-22 PROCEDURE — 99214 OFFICE O/P EST MOD 30 MIN: CPT | Performed by: INTERNAL MEDICINE

## 2022-09-22 NOTE — PROGRESS NOTES
Cardiology Followup    Lyric Olvera  [id number]  1940      Interval History: Lyric Olvera is a 78y o  year old male who is here for followup of CHF and atrial fibrillation  Since his last visit, he has been feeling well   he denies any palpitations, chest pain, shortness of breath, LE edema, orthopnea or PND  Diet is overall unchanged  There has not been a significant change in weight  Previously, had lower extremity edema that resolved with increased torsemide dose  He had blood work done in August 2022 which showed a creatinine of 2 34 which is improved from 3 3 earlier this year  He has a history of chronic combined systolic and diastolic congestive heart failure with his last ejection fraction measured of 40%  He had a stress test done which showed a fixed inferolateral wall defect without ischemia  He has a moderate-sized pericardial effusion which is chronic without tamponade  His last echocardiogram was done in April 2021  In addition, he has chronic atrial fibrillation and is on Eliquis therapy  He has had no significant bleeding in the past        Past Medical History:   Diagnosis Date    [facility])     Chronic kidney disease     Coronary artery disease     Diabetes mellitus ([facility] )     Hyperlipidemia     Hypertension      Social History     Socioeconomic History    Marital status:       Spouse name: Not on file    Number of children: 1    Years of education: 15    Highest education level: 12th grade   Occupational History    Not on file   Tobacco Use    Smoking status: Former Smoker    Smokeless tobacco: Former User   Vaping Use    Vaping Use: Never used   Substance and Sexual Activity    Alcohol use: Not Currently     Alcohol/week: 0 0 standard drinks     Comment: special occasions    Drug use: Not Currently    Sexual activity: Not on file   Other Topics Concern    Not on file   Social History Narrative    Not on file Social Determinants of Health     Financial Resource Strain: Not on file   Food Insecurity: Not on file   Transportation Needs: Not on file   Physical Activity: Not on file   Stress: Not on file   Social Connections: Not on file   Intimate Partner Violence: Not on file   Housing Stability: Not on file      Family History   Problem Relation Age of Onset    Heart disease Mother     Diabetes Father     Vision loss Father     Cancer Sister     Diabetes Sister      Past Surgical History:   Procedure Laterality Date    EYE SURGERY      SKIN CANCER EXCISION      TONSILLECTOMY         Current Outpatient Medications:     acetaminophen (TYLENOL) 325 mg tablet, Take 2 tablets (650 mg total) by mouth every 6 (six) hours as needed for mild pain, Disp: , Rfl: 0    allopurinol (ZYLOPRIM) 100 mg tablet, Take 100 mg by mouth 2 (two) times a day, Disp: , Rfl:     ALPRAZolam (XANAX) 0 5 mg tablet, 0 5 mg daily at bedtime , Disp: , Rfl: 0    ascorbic acid (VITAMIN C) 500 MG tablet, Take 1 tablet (500 mg total) by mouth daily, Disp: , Rfl: 0    atorvastatin (LIPITOR) 40 mg tablet, Take 1 tablet (40 mg total) by mouth daily with dinner, Disp: 30 tablet, Rfl: 0    Blood Pressure Monitor NILTON, by Does not apply route daily, Disp: 1 Device, Rfl: 0    carvedilol (COREG) 6 25 mg tablet, Take 1 tablet (6 25 mg total) by mouth 2 (two) times a day with meals, Disp: 60 tablet, Rfl: 0    cholecalciferol (VITAMIN D3) 1,000 units tablet, Take 1 tablet (1,000 Units total) by mouth daily, Disp: 60 tablet, Rfl: 3    Eliquis 2 5 MG, TAKE ONE TABLET BY MOUTH TWICE A DAY, Disp: 60 tablet, Rfl: 11    glimepiride (AMARYL) 4 mg tablet, Take 4 mg by mouth in the morning, Disp: , Rfl:     isosorbide mononitrate (IMDUR) 30 mg 24 hr tablet, Take 1 tablet (30 mg total) by mouth daily, Disp: 30 tablet, Rfl: 0    Januvia 100 MG tablet, Take 100 mg by mouth daily  , Disp: , Rfl:     latanoprost (XALATAN) 0 005 % ophthalmic solution, 1 drop daily at bedtime, Disp: , Rfl:     timolol (TIMOPTIC) 0 5 % ophthalmic solution, Administer 1 drop to both eyes daily, Disp: , Rfl:     torsemide (DEMADEX) 20 mg tablet, Take 1 tablet (20 mg total) by mouth daily (Patient taking differently: Take 40 mg by mouth daily), Disp: , Rfl: 0    ZINC OXIDE PO, Take by mouth daily, Disp: , Rfl:     metolazone (ZAROXOLYN) 5 mg tablet, Take 1 tablet (5 mg total) by mouth if needed (for weight gain more then % lbs) (Patient not taking: Reported on 9/22/2022), Disp: 90 tablet, Rfl: 3  Allergies   Allergen Reactions    Elemental Sulfur     Sulfa Antibiotics          Review of Systems:  Review of Systems   Respiratory: Negative for shortness of breath  Cardiovascular: Negative for chest pain, palpitations and leg swelling  Musculoskeletal: Positive for arthralgias  All other systems reviewed and are negative  Physical Exam:  Vitals:    09/22/22 1529   BP: 114/62   BP Location: Right arm   Patient Position: Sitting   Cuff Size: Standard   Pulse: 79   Temp: 97 8 °F (36 6 °C)   SpO2: 100%   Weight: 68 5 kg (151 lb)   Height: 5' 9" (1 753 m)     Physical Exam  Constitutional:       General: He is not in acute distress  Appearance: He is well-developed  He is not diaphoretic  HENT:      Head: Normocephalic and atraumatic  Eyes:      Conjunctiva/sclera: Conjunctivae normal       Pupils: Pupils are equal, round, and reactive to light  Neck:      Thyroid: No thyromegaly  Vascular: No JVD  Cardiovascular:      Rate and Rhythm: Normal rate  Rhythm irregular  Heart sounds: Murmur heard  No friction rub  No gallop  Pulmonary:      Effort: Pulmonary effort is normal       Breath sounds: Normal breath sounds  Musculoskeletal:      Cervical back: Neck supple  Skin:     General: Skin is warm and dry  Findings: No erythema or rash  Neurological:      General: No focal deficit present        Mental Status: He is alert and oriented to person, place, and time  Cranial Nerves: No cranial nerve deficit  Psychiatric:         Mood and Affect: Mood normal          Behavior: Behavior normal          Thought Content: Thought content normal          Judgment: Judgment normal          Labs: reviewed    Imaging: No results found  ECG: Atrial fibrillation at 80 bpm    Discussion/Summary:  1  Permanent atrial fibrillation ([facility] )    2  Chronic systolic congestive heart failure ([facility] )    3  Essential hypertension    4  Pericardial effusion    5  Controlled type 2 diabetes mellitus without complication, without long-term current use of insulin ([facility] )    6  CKD (chronic kidney disease) stage 4, GFR 15-29 ml/min ([facility])      - patient's lower extremity edema has resolved with increased torsemide dose  Creatinine has been stable  May continue torsemide 40 mg in morning and using additional 20 if edema returns     He has been monitoring daily weight which has been stable  - Continue Eliquis 2 5 mg twice a day  - Following up with nephrology for renal failure  - continue carvedilol 6 25 mg b i d

## 2022-09-23 PROCEDURE — 93000 ELECTROCARDIOGRAM COMPLETE: CPT | Performed by: INTERNAL MEDICINE

## 2022-10-18 LAB — HBA1C MFR BLD HPLC: 8.5 %

## 2022-11-07 ENCOUNTER — OFFICE VISIT (OUTPATIENT)
Dept: NEPHROLOGY | Facility: CLINIC | Age: 82
End: 2022-11-07

## 2022-11-07 VITALS
WEIGHT: 156 LBS | HEIGHT: 69 IN | HEART RATE: 64 BPM | SYSTOLIC BLOOD PRESSURE: 118 MMHG | DIASTOLIC BLOOD PRESSURE: 52 MMHG | BODY MASS INDEX: 23.11 KG/M2

## 2022-11-07 DIAGNOSIS — I50.22 CHRONIC SYSTOLIC CONGESTIVE HEART FAILURE (HCC): ICD-10-CM

## 2022-11-07 DIAGNOSIS — N18.4 BENIGN HYPERTENSION WITH CKD (CHRONIC KIDNEY DISEASE) STAGE IV (HCC): ICD-10-CM

## 2022-11-07 DIAGNOSIS — I12.9 BENIGN HYPERTENSION WITH CKD (CHRONIC KIDNEY DISEASE) STAGE IV (HCC): ICD-10-CM

## 2022-11-07 DIAGNOSIS — R80.1 PERSISTENT PROTEINURIA: ICD-10-CM

## 2022-11-07 DIAGNOSIS — N18.4 STAGE 4 CHRONIC KIDNEY DISEASE (HCC): Primary | ICD-10-CM

## 2022-11-07 NOTE — PATIENT INSTRUCTIONS
Chronic Kidney Disease stage IV- Baseline creatinine is 2 5-3  Suspected etiology is due to diabetic nephropathy, hypertensive nephrosclerosis, arteriolar nephrosclerosis, +/- renovascular disease  Creatinine and electrolytes at baseline  Kidney Smart: completed  Declines dialysis  Comprehensive Kidney Care: referred    Chronic Systolic CHF- EF 37% and follows with Dr Marguerite Claudio  He takes torsemide 40mg daily and metolazone as needed  Usual weight is around 145 lbs  Current weight is around 150 lbs  Would recommend taking metolazone if weight >150 lbs  Hypertension- Antihypertensive regimen includes carvedilol, imdur, torsemide  Avoid salt  Stay active  Avoid NSAIDs  Check your blood pressures at home  Proteinuria- Check UPC ratio  Previously at goal     Anemia- Check prior to next visit  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

## 2022-11-07 NOTE — PROGRESS NOTES
Assessment and Plan:    Mel Meier was seen today for follow-up  Diagnoses and all orders for this visit:    Stage 4 chronic kidney disease ([facility] [address] )  -     Ambulatory referral to advanced care planning; Future  -     Basic metabolic panel; Future  -     Magnesium; Future  -     Phosphorus; Future  -     Protein / creatinine ratio, urine; Future  -     PTH, intact; Future  -     CBC; Future  -     Vitamin D 25 hydroxy; Future    Chronic systolic congestive heart failure (HCC)    Benign hypertension with CKD (chronic kidney disease) stage IV (HCC)    Persistent proteinuria      Chronic Kidney Disease stage IV- Baseline creatinine is 2 5-3  Suspected etiology is due to diabetic nephropathy, hypertensive nephrosclerosis, arteriolar nephrosclerosis, +/- renovascular disease  Creatinine and electrolytes at baseline  Kidney Smart: completed  Declines dialysis if needed in the future  Understands the risk of death without dialysis when needed  Comprehensive Kidney Care: referred    Chronic Systolic CHF- EF 13% and follows with Dr Juvencio Li  He takes torsemide 40mg daily and metolazone as needed  Usual weight is around 145 lbs  Current weight is around 150 lbs  Would recommend taking metolazone if weight >150 lbs  Hypertension- Antihypertensive regimen includes carvedilol, imdur, torsemide  Avoid salt  Stay active  Avoid NSAIDs  Proteinuria- Check UPC ratio  Previously at goal     Anemia- Check prior to next visit  Bone Mineral Disorder- Check studies prior to next visit  Diabetes mellitus type 2- HgbA1c above goal but recently regimen was adjusted and seems to be improving on home sugar monitoring  Follow up with Dr Hortencia Beach or an advanced practitioner in 3 months  Please call the office with any questions or concerns  Reason for Visit: Follow-up (CKD 4/)    HPI: Jesus Day is a 78 y o  male who is here for follow up of advanced kidney disease  Patient denies acute complaints  He denies SOB  He states his leg swelling is improved from prior and he is back on his old diuretic dose  His weights have been stable around 148 lbs with clothes on but no shoes  He follows with the heart failure team   He does not check his BP at home  ROS: A complete review of systems was performed and was negative unless otherwise noted in the history of present illness      Allergies:   Elemental sulfur and Sulfa antibiotics    Medications:     Current Outpatient Medications:   •  allopurinol (ZYLOPRIM) 100 mg tablet, Take 100 mg by mouth 2 (two) times a day, Disp: , Rfl:   •  ALPRAZolam (XANAX) 0 5 mg tablet, 0 5 mg daily at bedtime , Disp: , Rfl: 0  •  ascorbic acid (VITAMIN C) 500 MG tablet, Take 1 tablet (500 mg total) by mouth daily, Disp: , Rfl: 0  •  atorvastatin (LIPITOR) 40 mg tablet, Take 1 tablet (40 mg total) by mouth daily with dinner, Disp: 30 tablet, Rfl: 0  •  carvedilol (COREG) 6 25 mg tablet, Take 1 tablet (6 25 mg total) by mouth 2 (two) times a day with meals, Disp: 60 tablet, Rfl: 0  •  cholecalciferol (VITAMIN D3) 1,000 units tablet, Take 1 tablet (1,000 Units total) by mouth daily, Disp: 60 tablet, Rfl: 3  •  Eliquis 2 5 MG, TAKE ONE TABLET BY MOUTH TWICE A DAY, Disp: 60 tablet, Rfl: 11  •  glimepiride (AMARYL) 4 mg tablet, Take 4 mg by mouth in the morning, Disp: , Rfl:   •  isosorbide mononitrate (IMDUR) 30 mg 24 hr tablet, Take 1 tablet (30 mg total) by mouth daily, Disp: 30 tablet, Rfl: 0  •  Januvia 100 MG tablet, Take 100 mg by mouth daily  , Disp: , Rfl:   •  latanoprost (XALATAN) 0 005 % ophthalmic solution, 1 drop daily at bedtime, Disp: , Rfl:   •  timolol (TIMOPTIC) 0 5 % ophthalmic solution, Administer 1 drop to both eyes daily, Disp: , Rfl:   •  torsemide (DEMADEX) 20 mg tablet, Take 1 tablet (20 mg total) by mouth daily (Patient taking differently: Take 40 mg by mouth daily), Disp: , Rfl: 0  •  ZINC OXIDE PO, Take by mouth daily, Disp: , Rfl:   •  acetaminophen (TYLENOL) 325 mg tablet, Take 2 tablets (650 mg total) by mouth every 6 (six) hours as needed for mild pain, Disp: , Rfl: 0  •  Blood Pressure Monitor NILTON, by Does not apply route daily, Disp: 1 Device, Rfl: 0  •  metolazone (ZAROXOLYN) 5 mg tablet, Take 1 tablet (5 mg total) by mouth if needed (for weight gain more then % lbs) (Patient not taking: No sig reported), Disp: 90 tablet, Rfl: 3    Past Medical History:   Diagnosis Date   • Atrial fibrillation (HCC)    • Chronic kidney disease    • Coronary artery disease    • Diabetes mellitus ([facility] [address] )    • Hyperlipidemia    • Hypertension      Past Surgical History:   Procedure Laterality Date   • EYE SURGERY     • SKIN CANCER EXCISION     • TONSILLECTOMY       Family History   Problem Relation Age of Onset   • Heart disease Mother    • Diabetes Father    • Vision loss Father    • Cancer Sister    • Diabetes Sister       reports that he has quit smoking  He has quit using smokeless tobacco  He reports previous alcohol use  He reports previous drug use  Physical Exam:   Vitals:    11/07/22 1435 11/07/22 1458   BP:  118/52   BP Location:  Left arm   Patient Position:  Sitting   Cuff Size:  Standard   Pulse:  64   Weight: 70 8 kg (156 lb)    Height: 5' 9" (1 753 m)      Body mass index is 23 04 kg/m²  General: NAD  Neuro: AAO  Skin: no rash  Eyes: anicteric  ENMT: mm moist  Neck: no masses  Respiratory: decreased breath sounds on right side  Cardiovascular: RRR  Extremities: + bilateral LE edema, L>R  Gastrointestinal: soft nt nd    Procedure:  No results found for this or any previous visit  Lab Results   Component Value Date    CALCIUM 8 7 08/04/2022    K 4 7 08/04/2022    CO2 28 08/04/2022    CL 95 08/04/2022    BUN 61 08/04/2022    CREATININE 2 34 08/04/2022     I have personally reviewed the blood work as stated above and in my note  I have personally reviewed Dr Sebastian Portillo office note

## 2023-02-09 ENCOUNTER — OFFICE VISIT (OUTPATIENT)
Dept: NEPHROLOGY | Facility: CLINIC | Age: 83
End: 2023-02-09

## 2023-02-09 VITALS
HEART RATE: 60 BPM | HEIGHT: 69 IN | SYSTOLIC BLOOD PRESSURE: 110 MMHG | WEIGHT: 148 LBS | BODY MASS INDEX: 21.92 KG/M2 | DIASTOLIC BLOOD PRESSURE: 60 MMHG

## 2023-02-09 DIAGNOSIS — I12.9 TYPE 2 DIABETES MELLITUS WITH STAGE 4 CHRONIC KIDNEY DISEASE AND HYPERTENSION (HCC): ICD-10-CM

## 2023-02-09 DIAGNOSIS — N18.4 STAGE 4 CHRONIC KIDNEY DISEASE (HCC): Primary | ICD-10-CM

## 2023-02-09 DIAGNOSIS — M89.9 CHRONIC KIDNEY DISEASE-MINERAL AND BONE DISORDER: ICD-10-CM

## 2023-02-09 DIAGNOSIS — D64.9 ANEMIA, UNSPECIFIED TYPE: ICD-10-CM

## 2023-02-09 DIAGNOSIS — N18.9 CHRONIC KIDNEY DISEASE-MINERAL AND BONE DISORDER: ICD-10-CM

## 2023-02-09 DIAGNOSIS — E11.22 TYPE 2 DIABETES MELLITUS WITH STAGE 4 CHRONIC KIDNEY DISEASE AND HYPERTENSION (HCC): ICD-10-CM

## 2023-02-09 DIAGNOSIS — N18.4 TYPE 2 DIABETES MELLITUS WITH STAGE 4 CHRONIC KIDNEY DISEASE AND HYPERTENSION (HCC): ICD-10-CM

## 2023-02-09 DIAGNOSIS — Z71.89 ADVANCED CARE PLANNING/COUNSELING DISCUSSION: Primary | ICD-10-CM

## 2023-02-09 DIAGNOSIS — R80.1 PERSISTENT PROTEINURIA: ICD-10-CM

## 2023-02-09 DIAGNOSIS — E83.9 CHRONIC KIDNEY DISEASE-MINERAL AND BONE DISORDER: ICD-10-CM

## 2023-02-09 DIAGNOSIS — I12.9 BENIGN HYPERTENSION WITH CKD (CHRONIC KIDNEY DISEASE) STAGE IV (HCC): ICD-10-CM

## 2023-02-09 DIAGNOSIS — I50.22 CHRONIC SYSTOLIC CONGESTIVE HEART FAILURE (HCC): ICD-10-CM

## 2023-02-09 DIAGNOSIS — N18.4 BENIGN HYPERTENSION WITH CKD (CHRONIC KIDNEY DISEASE) STAGE IV (HCC): ICD-10-CM

## 2023-02-09 NOTE — ACP (ADVANCE CARE PLANNING)
Advanced Care Planning Progress Note    Serious Illness Conversation    No data filed     NEPHROLOGY ADVANCED GOALS OF CARE MEETING  Shade Albright 78 y o  male MRN: [id number]  2023        ASSESSMENT and PLAN:    I had the pleasure of seeing Shade Albright today in the renal clinic for discussion of the patient's goals of care  Our discussion today will focus on helping Niles Orpoeza achieve their desired goals, long term goals of care and how best to achieve those goals  The participants during this visit include the patient and his son Boogie Tapia  Kidney Smart Class: Completed Kidney Smart Class  Would Pursue Dialysis if Needed: Yes  Dialysis Access: No Access   Advance Directive: Not Completed  Five Wishes Packet: Not Completed  Code Status: Level 1: Full Code  POA: Apollo Gram they need to be referred to Palliative Care: No  Do they need to be referred to Hospice: No  Length/Time of Meetin Minutes  Outpatient Nephrologist: Monique Márquez MD    Discussion and Plan:    Mr Tahira Peterson is an 78year old male who follows with Dr Fortino Pimentel for advanced chronic kidney disease  He has stated in the past that he is not interested in dialysis  Today, we discussed his goals of care and wishes for his overall health and the rest of his life  He is not able to answer a lot of questions and I am not sure how much he fully understands  His son is present for the visit and states they discussed his wishes prior but also states the patient has always been taken care of and has never had to think much on his own  The patient states he would want CPR, intubation, dialysis, aggressive tests/procedures, etc   He states he would likely want them stopped if they were not helping however it appears family would have to determine this  A few months ago, he related that he would not want dialysis but now he states he would want dialysis  His son states he would prefer at home dialysis    We discussed this in detail and I explained that I do not believe the patient would be a candidate for PD however if the son was willing to commit 100%, this could work  He states he will think about this           Medications:    Current Outpatient Medications:   •  acetaminophen (TYLENOL) 325 mg tablet, Take 2 tablets (650 mg total) by mouth every 6 (six) hours as needed for mild pain, Disp: , Rfl: 0  •  allopurinol (ZYLOPRIM) 100 mg tablet, Take 100 mg by mouth 2 (two) times a day, Disp: , Rfl:   •  ALPRAZolam (XANAX) 0 5 mg tablet, 0 5 mg daily at bedtime , Disp: , Rfl: 0  •  ascorbic acid (VITAMIN C) 500 MG tablet, Take 1 tablet (500 mg total) by mouth daily, Disp: , Rfl: 0  •  atorvastatin (LIPITOR) 40 mg tablet, Take 1 tablet (40 mg total) by mouth daily with dinner, Disp: 30 tablet, Rfl: 0  •  Blood Pressure Monitor NILTON, by Does not apply route daily, Disp: 1 Device, Rfl: 0  •  carvedilol (COREG) 6 25 mg tablet, Take 1 tablet (6 25 mg total) by mouth 2 (two) times a day with meals, Disp: 60 tablet, Rfl: 0  •  cholecalciferol (VITAMIN D3) 1,000 units tablet, Take 1 tablet (1,000 Units total) by mouth daily (Patient taking differently: Take 2,000 Units by mouth daily), Disp: 60 tablet, Rfl: 3  •  Eliquis 2 5 MG, TAKE ONE TABLET BY MOUTH TWICE A DAY, Disp: 60 tablet, Rfl: 11  •  glimepiride (AMARYL) 4 mg tablet, Take 4 mg by mouth 2 (two) times a day, Disp: , Rfl:   •  isosorbide mononitrate (IMDUR) 30 mg 24 hr tablet, Take 1 tablet (30 mg total) by mouth daily, Disp: 30 tablet, Rfl: 0  •  Januvia 100 MG tablet, Take 100 mg by mouth daily  , Disp: , Rfl:   •  latanoprost (XALATAN) 0 005 % ophthalmic solution, 1 drop daily at bedtime, Disp: , Rfl:   •  metolazone (ZAROXOLYN) 5 mg tablet, Take 1 tablet (5 mg total) by mouth if needed (for weight gain more then % lbs), Disp: 90 tablet, Rfl: 3  •  timolol (TIMOPTIC) 0 5 % ophthalmic solution, Administer 1 drop to both eyes daily, Disp: , Rfl:   •  torsemide (DEMADEX) 20 mg tablet, Take 1 tablet (20 mg total) by mouth daily (Patient taking differently: Take 40 mg by mouth daily), Disp: , Rfl: 0  •  ZINC OXIDE PO, Take by mouth daily, Disp: , Rfl:         Serious Illness Conversation    1  What is your understanding now of where you are with your illness? Prognostic Understanding: no understanding of prognosis     2  How much information about what is likely to be ahead with your illness would you like to have? Information: patient wants to be fully informed     3  What did you (clinician) communicate to the patient? Prognostic Communication: Uncertain - It can be difficult to predict what will happen with your illness  I hope you will continue to live well for a long time but I’m worried that you could get sick quickly, and I think it is important to prepare for that possibility  4  If your health situation worsens, what are your most important goals? Unable to answer     5  What are the biggest fears and worries about the future and your health? Unable to answer     6  What abilities are so critical to your life that you cannot imagine living without them? Unable to answer     7  What gives you strength as you think about the future with your illness? Unable to answer     8  If you become sicker, how much are you willing to go through for the possibility of gaining more time? Be in the hospital: Yes    Be in the ICU: Yes    Be on a ventilator: Yes Be uncomfortable: Yes   Be on dialysis: Yes Undergo aggressive test and/or procedures: Yes   9  How much does your proxy and family know about your priorities and wishes? Family and patient discussed prior to appointment  How does this plan sound to you? I will do everything I can to help you through this    Patient verbalized understanding of the plan     I have spent 30 minutes speaking with my patient on advanced care planning today or during this visit     Advanced directives  Five Wishes: Patient has Five Wishes, not in chart             [address]

## 2023-02-09 NOTE — PROGRESS NOTES
Assessment and Plan:    Zeny Negron was seen today for follow-up  Diagnoses and all orders for this visit:    Stage 4 chronic kidney disease ([facility] [address] )  -     Basic metabolic panel; Future  -     CBC; Future  -     Magnesium; Future  -     Phosphorus; Future  -     Protein / creatinine ratio, urine; Future  -     PTH, intact; Future    Persistent proteinuria    Benign hypertension with CKD (chronic kidney disease) stage IV (HCC)    Chronic systolic congestive heart failure (HCC)    Anemia, unspecified type    Chronic kidney disease-mineral and bone disorder    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 (HCC)      Chronic Kidney Disease stage IV- Baseline creatinine is 2 5-3  Suspected etiology is due to diabetic nephropathy, hypertensive nephrosclerosis, arteriolar nephrosclerosis, +/- renovascular disease  Creatinine and electrolytes at baseline from October 2022  Kidney Smart: completed, recommend a second class for modality review as patient and family now want dialysis  Comprehensive Kidney Care: will plan to complete today 2/9/23     Chronic Systolic CHF- EF 02% and follows with Dr Karolyn Copeland  He takes torsemide 40mg daily and metolazone as needed  Weight has been in the high 140s  Would recommend taking metolazone if weight >150 lbs     Hypertension- Antihypertensive regimen includes carvedilol, imdur, torsemide  Avoid salt  Stay active  Avoid NSAIDs  BP acceptable      Proteinuria- Check UPC ratio      Anemia- Check prior to next visit      Bone Mineral Disorder- Check studies prior to next visit      Diabetes mellitus type 2- HgbA1c above goal but recently regimen was adjusted and seems to be improving on home sugar monitoring  HGBa1c is elevated at 8 0 from January 2023        Follow up with Dr Whitehead Failing or an AP in 3 months  Please call the office with any questions or concerns        Reason for Visit: Follow-up (CKD 4/)    HPI: Kelly Frankel is a 78 y o  male who is here for follow up of advanced chronic kidney disease  He was last seen in August   He denies acute complaints  He presents with his son Suzan Olivier  In the past, he told me he would not want dialysis but now states he would want dialysis if needed  Son states they would want home dialysis however patient would not be a candidate for this unless son provides all care for him  I explained this in detail to the son that he would need to be the one training and also performing the dialysis at home  ROS: A complete review of systems was performed and was negative unless otherwise noted in the history of present illness      Allergies:   Elemental sulfur and Sulfa antibiotics    Medications:     Current Outpatient Medications:   •  allopurinol (ZYLOPRIM) 100 mg tablet, Take 100 mg by mouth 2 (two) times a day, Disp: , Rfl:   •  ALPRAZolam (XANAX) 0 5 mg tablet, 0 5 mg daily at bedtime , Disp: , Rfl: 0  •  ascorbic acid (VITAMIN C) 500 MG tablet, Take 1 tablet (500 mg total) by mouth daily, Disp: , Rfl: 0  •  atorvastatin (LIPITOR) 40 mg tablet, Take 1 tablet (40 mg total) by mouth daily with dinner, Disp: 30 tablet, Rfl: 0  •  carvedilol (COREG) 6 25 mg tablet, Take 1 tablet (6 25 mg total) by mouth 2 (two) times a day with meals, Disp: 60 tablet, Rfl: 0  •  cholecalciferol (VITAMIN D3) 1,000 units tablet, Take 1 tablet (1,000 Units total) by mouth daily (Patient taking differently: Take 2,000 Units by mouth daily), Disp: 60 tablet, Rfl: 3  •  Eliquis 2 5 MG, TAKE ONE TABLET BY MOUTH TWICE A DAY, Disp: 60 tablet, Rfl: 11  •  glimepiride (AMARYL) 4 mg tablet, Take 4 mg by mouth 2 (two) times a day, Disp: , Rfl:   •  isosorbide mononitrate (IMDUR) 30 mg 24 hr tablet, Take 1 tablet (30 mg total) by mouth daily, Disp: 30 tablet, Rfl: 0  •  Januvia 100 MG tablet, Take 100 mg by mouth daily  , Disp: , Rfl:   •  latanoprost (XALATAN) 0 005 % ophthalmic solution, 1 drop daily at bedtime, Disp: , Rfl:   •  metolazone (ZAROXOLYN) 5 mg tablet, Take 1 tablet (5 mg total) by mouth if needed (for weight gain more then % lbs), Disp: 90 tablet, Rfl: 3  •  timolol (TIMOPTIC) 0 5 % ophthalmic solution, Administer 1 drop to both eyes daily, Disp: , Rfl:   •  torsemide (DEMADEX) 20 mg tablet, Take 1 tablet (20 mg total) by mouth daily (Patient taking differently: Take 40 mg by mouth daily), Disp: , Rfl: 0  •  ZINC OXIDE PO, Take by mouth daily, Disp: , Rfl:   •  acetaminophen (TYLENOL) 325 mg tablet, Take 2 tablets (650 mg total) by mouth every 6 (six) hours as needed for mild pain, Disp: , Rfl: 0  •  Blood Pressure Monitor NILTON, by Does not apply route daily, Disp: 1 Device, Rfl: 0    Past Medical History:   Diagnosis Date   • Atrial fibrillation (HCC)    • Chronic kidney disease    • Coronary artery disease    • Diabetes mellitus ([facility] [address] )    • Hyperlipidemia    • Hypertension      Past Surgical History:   Procedure Laterality Date   • EYE SURGERY     • SKIN CANCER EXCISION     • TONSILLECTOMY       Family History   Problem Relation Age of Onset   • Heart disease Mother    • Diabetes Father    • Vision loss Father    • Cancer Sister    • Diabetes Sister       reports that he has quit smoking  He has quit using smokeless tobacco  He reports that he does not currently use alcohol  He reports that he does not currently use drugs  Physical Exam:   Vitals:    02/09/23 1509 02/09/23 1530   BP:  110/60   BP Location:  Left arm   Patient Position:  Sitting   Cuff Size:  Standard   Pulse:  60   Weight: 67 1 kg (148 lb)    Height: 5' 9" (1 753 m)      Body mass index is 21 86 kg/m²  General: NAD  Neuro: AAO  Skin: no rash  Eyes: anicteric  ENMT: mm moist  Neck: no masses  Respiratory: CTAB  Cardiovascular: RRR  Extremities: + bilateral LE edema  Gastrointestinal: soft nt nd    Procedure:  No results found for this or any previous visit      Lab Results   Component Value Date    CALCIUM 8 7 08/04/2022    K 4 7 08/04/2022    CO2 28 08/04/2022    CL 95 08/04/2022 BUN 61 08/04/2022    CREATININE 2 34 08/04/2022     I have personally reviewed the blood work as stated above and in my note  I have personally reviewed Dr Freddie Torres office note

## 2023-02-14 ENCOUNTER — DOCUMENTATION (OUTPATIENT)
Dept: NEPHROLOGY | Facility: CLINIC | Age: 83
End: 2023-02-14

## 2023-02-14 LAB
CO2 SERPL-SCNC: 27 MMOL/L (ref 21–32)
EXT GLUCOSE BLD: 219
EXTERNAL ALBUMIN: 3.8
EXTERNAL ALK PHOS: 152
EXTERNAL ALT: 14
EXTERNAL AST: 16
EXTERNAL BUN: 54
EXTERNAL CALCIUM: 8.8
EXTERNAL CHLORIDE: 97
EXTERNAL CREATININE: 2.62
EXTERNAL EGFR: 24
EXTERNAL POTASSIUM: 4.7
EXTERNAL SODIUM: 138
EXTERNAL T.BILIRUBIN: 0.7
EXTERNAL TOTAL PROTEIN: 6.3

## 2023-02-21 LAB
EXT GLUCOSE BLD: 327
EXTERNAL ALBUMIN: 4
EXTERNAL ALK PHOS: 199
EXTERNAL ALT: 19
EXTERNAL AST: 19
EXTERNAL BUN: 116
EXTERNAL CALCIUM: 9
EXTERNAL CHLORIDE: 77
EXTERNAL CO2: 33
EXTERNAL CREATININE: 4.05
EXTERNAL EGFR: 14
EXTERNAL POTASSIUM: 3.5
EXTERNAL SODIUM: 126
EXTERNAL T.BILIRUBIN: 1.2
EXTERNAL TOTAL PROTEIN: 6.6
MAGNESIUM SERPL-MCNC: 2.7 MG/DL (ref 1.6–2.6)
PHOSPHATE SERPL-MCNC: 4.1 MG/DL (ref 2.3–4.1)
PTH-INTACT SERPL-MCNC: 68 PG/ML

## 2023-02-22 ENCOUNTER — DOCUMENTATION (OUTPATIENT)
Dept: NEPHROLOGY | Facility: CLINIC | Age: 83
End: 2023-02-22

## 2023-02-22 ENCOUNTER — HOSPITAL ENCOUNTER (INPATIENT)
Facility: HOSPITAL | Age: 83
LOS: 5 days | Discharge: HOME HEALTH | End: 2023-02-27
Attending: EMERGENCY MEDICINE | Admitting: INTERNAL MEDICINE

## 2023-02-22 DIAGNOSIS — E11.22 TYPE 2 DIABETES MELLITUS WITH STAGE 4 CHRONIC KIDNEY DISEASE AND HYPERTENSION (HCC): ICD-10-CM

## 2023-02-22 DIAGNOSIS — N18.4 STAGE 4 CHRONIC KIDNEY DISEASE (HCC): ICD-10-CM

## 2023-02-22 DIAGNOSIS — N17.9 ACUTE KIDNEY INJURY SUPERIMPOSED ON CHRONIC KIDNEY DISEASE (HCC): ICD-10-CM

## 2023-02-22 DIAGNOSIS — M89.9 CHRONIC KIDNEY DISEASE-MINERAL AND BONE DISORDER: ICD-10-CM

## 2023-02-22 DIAGNOSIS — I50.42 CHRONIC COMBINED SYSTOLIC AND DIASTOLIC CONGESTIVE HEART FAILURE (HCC): ICD-10-CM

## 2023-02-22 DIAGNOSIS — N18.9 CHRONIC KIDNEY DISEASE-MINERAL AND BONE DISORDER: ICD-10-CM

## 2023-02-22 DIAGNOSIS — N18.9 ACUTE KIDNEY INJURY SUPERIMPOSED ON CHRONIC KIDNEY DISEASE (HCC): ICD-10-CM

## 2023-02-22 DIAGNOSIS — N18.4 TYPE 2 DIABETES MELLITUS WITH STAGE 4 CHRONIC KIDNEY DISEASE AND HYPERTENSION (HCC): ICD-10-CM

## 2023-02-22 DIAGNOSIS — E87.1 HYPONATREMIA: ICD-10-CM

## 2023-02-22 DIAGNOSIS — I12.9 TYPE 2 DIABETES MELLITUS WITH STAGE 4 CHRONIC KIDNEY DISEASE AND HYPERTENSION (HCC): ICD-10-CM

## 2023-02-22 DIAGNOSIS — R33.9 URINARY RETENTION: ICD-10-CM

## 2023-02-22 DIAGNOSIS — R79.89 PRERENAL AZOTEMIA: Primary | ICD-10-CM

## 2023-02-22 DIAGNOSIS — E83.9 CHRONIC KIDNEY DISEASE-MINERAL AND BONE DISORDER: ICD-10-CM

## 2023-02-22 LAB
2HR DELTA HS TROPONIN: -5 NG/L
ALBUMIN SERPL BCP-MCNC: 3.6 G/DL (ref 3.5–5)
ALP SERPL-CCNC: 149 U/L (ref 34–104)
ALT SERPL W P-5'-P-CCNC: 17 U/L (ref 7–52)
ANION GAP SERPL CALCULATED.3IONS-SCNC: 10 MMOL/L (ref 4–13)
AST SERPL W P-5'-P-CCNC: 16 U/L (ref 13–39)
BASOPHILS # BLD AUTO: 0.02 THOUSANDS/ÂΜL (ref 0–0.1)
BASOPHILS NFR BLD AUTO: 0 % (ref 0–1)
BILIRUB SERPL-MCNC: 1.42 MG/DL (ref 0.2–1)
BUN SERPL-MCNC: 125 MG/DL (ref 5–25)
CALCIUM SERPL-MCNC: 8.7 MG/DL (ref 8.4–10.2)
CARDIAC TROPONIN I PNL SERPL HS: 15 NG/L
CARDIAC TROPONIN I PNL SERPL HS: 20 NG/L
CHLORIDE SERPL-SCNC: 82 MMOL/L (ref 96–108)
CO2 SERPL-SCNC: 37 MMOL/L (ref 21–32)
CREAT SERPL-MCNC: 3.62 MG/DL (ref 0.6–1.3)
EOSINOPHIL # BLD AUTO: 0.03 THOUSAND/ÂΜL (ref 0–0.61)
EOSINOPHIL NFR BLD AUTO: 0 % (ref 0–6)
ERYTHROCYTE [DISTWIDTH] IN BLOOD BY AUTOMATED COUNT: 14.8 % (ref 11.6–15.1)
GFR SERPL CREATININE-BSD FRML MDRD: 14 ML/MIN/1.73SQ M
GLUCOSE SERPL-MCNC: 215 MG/DL (ref 65–140)
GLUCOSE SERPL-MCNC: 359 MG/DL (ref 65–140)
HCT VFR BLD AUTO: 39 % (ref 36.5–49.3)
HCT VFR BLD AUTO: 40.8 % (ref 36.5–49.3)
HGB BLD-MCNC: 13.2 G/DL (ref 12–17)
HGB BLD-MCNC: 13.7 G/DL (ref 12–17)
IMM GRANULOCYTES # BLD AUTO: 0.03 THOUSAND/UL (ref 0–0.2)
IMM GRANULOCYTES NFR BLD AUTO: 0 % (ref 0–2)
LYMPHOCYTES # BLD AUTO: 0.67 THOUSANDS/ÂΜL (ref 0.6–4.47)
LYMPHOCYTES NFR BLD AUTO: 9 % (ref 14–44)
MAGNESIUM SERPL-MCNC: 2.5 MG/DL (ref 1.9–2.7)
MCH RBC QN AUTO: 33.3 PG (ref 26.8–34.3)
MCHC RBC AUTO-ENTMCNC: 33.6 G/DL (ref 31.4–37.4)
MCV RBC AUTO: 99 FL (ref 82–98)
MONOCYTES # BLD AUTO: 0.92 THOUSAND/ÂΜL (ref 0.17–1.22)
MONOCYTES NFR BLD AUTO: 12 % (ref 4–12)
NEUTROPHILS # BLD AUTO: 5.8 THOUSANDS/ÂΜL (ref 1.85–7.62)
NEUTS SEG NFR BLD AUTO: 79 % (ref 43–75)
NRBC BLD AUTO-RTO: 0 /100 WBCS
PHOSPHATE SERPL-MCNC: 3.6 MG/DL (ref 2.3–4.1)
PLATELET # BLD AUTO: 111 THOUSANDS/UL (ref 149–390)
PLATELET # BLD AUTO: 134 THOUSANDS/UL (ref 149–390)
PMV BLD AUTO: 10.2 FL (ref 8.9–12.7)
POTASSIUM SERPL-SCNC: 3.5 MMOL/L (ref 3.5–5.3)
PROT SERPL-MCNC: 6.9 G/DL (ref 6.4–8.4)
RBC # BLD AUTO: 4.11 MILLION/UL (ref 3.88–5.62)
SODIUM SERPL-SCNC: 129 MMOL/L (ref 135–147)
TSH SERPL DL<=0.05 MIU/L-ACNC: 1.81 UIU/ML (ref 0.45–4.5)
WBC # BLD AUTO: 6.6 THOUSAND/UL
WBC # BLD AUTO: 7.47 THOUSAND/UL (ref 4.31–10.16)

## 2023-02-22 RX ORDER — INSULIN LISPRO 100 [IU]/ML
1-5 INJECTION, SOLUTION INTRAVENOUS; SUBCUTANEOUS
Status: DISCONTINUED | OUTPATIENT
Start: 2023-02-22 | End: 2023-02-27 | Stop reason: HOSPADM

## 2023-02-22 RX ORDER — ZINC SULFATE 50(220)MG
220 CAPSULE ORAL DAILY
Status: DISCONTINUED | OUTPATIENT
Start: 2023-02-23 | End: 2023-02-27 | Stop reason: HOSPADM

## 2023-02-22 RX ORDER — ASCORBIC ACID 500 MG
500 TABLET ORAL DAILY
Status: DISCONTINUED | OUTPATIENT
Start: 2023-02-23 | End: 2023-02-27 | Stop reason: HOSPADM

## 2023-02-22 RX ORDER — CARVEDILOL 6.25 MG/1
6.25 TABLET ORAL 2 TIMES DAILY WITH MEALS
Status: DISCONTINUED | OUTPATIENT
Start: 2023-02-23 | End: 2023-02-27 | Stop reason: HOSPADM

## 2023-02-22 RX ORDER — TIMOLOL MALEATE 5 MG/ML
1 SOLUTION/ DROPS OPHTHALMIC DAILY
Status: DISCONTINUED | OUTPATIENT
Start: 2023-02-23 | End: 2023-02-27 | Stop reason: HOSPADM

## 2023-02-22 RX ORDER — LATANOPROST 50 UG/ML
1 SOLUTION/ DROPS OPHTHALMIC
Status: DISCONTINUED | OUTPATIENT
Start: 2023-02-22 | End: 2023-02-27 | Stop reason: HOSPADM

## 2023-02-22 RX ORDER — HEPARIN SODIUM 5000 [USP'U]/ML
5000 INJECTION, SOLUTION INTRAVENOUS; SUBCUTANEOUS EVERY 8 HOURS SCHEDULED
Status: DISCONTINUED | OUTPATIENT
Start: 2023-02-22 | End: 2023-02-22

## 2023-02-22 RX ORDER — ALPRAZOLAM 0.25 MG/1
0.25 TABLET ORAL
Status: DISCONTINUED | OUTPATIENT
Start: 2023-02-22 | End: 2023-02-27 | Stop reason: HOSPADM

## 2023-02-22 RX ORDER — INSULIN LISPRO 100 [IU]/ML
1-5 INJECTION, SOLUTION INTRAVENOUS; SUBCUTANEOUS
Status: DISCONTINUED | OUTPATIENT
Start: 2023-02-23 | End: 2023-02-27 | Stop reason: HOSPADM

## 2023-02-22 RX ORDER — SODIUM CHLORIDE 9 MG/ML
50 INJECTION, SOLUTION INTRAVENOUS CONTINUOUS
Status: DISCONTINUED | OUTPATIENT
Start: 2023-02-22 | End: 2023-02-24

## 2023-02-22 RX ORDER — ISOSORBIDE MONONITRATE 30 MG/1
30 TABLET, EXTENDED RELEASE ORAL DAILY
Status: DISCONTINUED | OUTPATIENT
Start: 2023-02-23 | End: 2023-02-27 | Stop reason: HOSPADM

## 2023-02-22 RX ORDER — ATORVASTATIN CALCIUM 40 MG/1
40 TABLET, FILM COATED ORAL
Status: DISCONTINUED | OUTPATIENT
Start: 2023-02-22 | End: 2023-02-27 | Stop reason: HOSPADM

## 2023-02-22 RX ORDER — ALLOPURINOL 100 MG/1
100 TABLET ORAL 2 TIMES DAILY
Status: DISCONTINUED | OUTPATIENT
Start: 2023-02-22 | End: 2023-02-27 | Stop reason: HOSPADM

## 2023-02-22 RX ORDER — MELATONIN
2000 DAILY
Status: DISCONTINUED | OUTPATIENT
Start: 2023-02-23 | End: 2023-02-27 | Stop reason: HOSPADM

## 2023-02-22 RX ORDER — ACETAMINOPHEN 325 MG/1
650 TABLET ORAL EVERY 6 HOURS PRN
Status: DISCONTINUED | OUTPATIENT
Start: 2023-02-22 | End: 2023-02-27 | Stop reason: HOSPADM

## 2023-02-22 RX ADMIN — SODIUM CHLORIDE 50 ML/HR: 0.9 INJECTION, SOLUTION INTRAVENOUS at 22:13

## 2023-02-22 RX ADMIN — APIXABAN 2.5 MG: 2.5 TABLET, FILM COATED ORAL at 22:11

## 2023-02-22 RX ADMIN — ALLOPURINOL 100 MG: 100 TABLET ORAL at 22:11

## 2023-02-22 RX ADMIN — LATANOPROST 1 DROP: 50 SOLUTION OPHTHALMIC at 22:12

## 2023-02-22 RX ADMIN — ATORVASTATIN CALCIUM 40 MG: 40 TABLET, FILM COATED ORAL at 22:11

## 2023-02-22 RX ADMIN — ALPRAZOLAM 0.25 MG: 0.25 TABLET ORAL at 22:11

## 2023-02-22 RX ADMIN — INSULIN LISPRO 4 UNITS: 100 INJECTION, SOLUTION INTRAVENOUS; SUBCUTANEOUS at 22:14

## 2023-02-22 RX ADMIN — SODIUM CHLORIDE 500 ML: 0.9 INJECTION, SOLUTION INTRAVENOUS at 13:54

## 2023-02-22 NOTE — ED PROVIDER NOTES
History  Chief Complaint   Patient presents with   • Abnormal Lab     States had blood work done yesterday and told to come in for elevated BUN and Mag level was off     Patient is an 78-year-old male  He has a history of diabetes, hypertension, hyperlipidemia, chronic kidney disease, coronary artery disease and atrial fibrillation  He has had congestive heart failure  He is anticoagulated on Eliquis  Patient was sent to the emergency room for lab abnormalities  Laboratory studies were done by his primary MD   As per son, they were routine labs  The lab abnormalities are not present in the EMR, but son feels patient had an abnormal magnesium and BUN  Review of old records suggest that have been discussions about potential dialysis  Patient himself is without complaints  He is, however, a poor historian  Son reports that he has been weak and fatigued for at least 3 weeks, probably longer  Gradually getting worse  He has been dizzy  Has had poor appetite  No chest pain or trouble breathing  No swelling  No fever  Symptoms are moderate in severity  Clear if there are any aggravating or relieving factors  Prior to Admission Medications   Prescriptions Last Dose Informant Patient Reported? Taking?    ALPRAZolam (XANAX) 0 5 mg tablet   Yes No   Si 5 mg daily at bedtime    Blood Pressure Monitor NILTON   No No   Sig: by Does not apply route daily   Eliquis 2 5 MG   No No   Sig: TAKE ONE TABLET BY MOUTH TWICE A DAY   Januvia 100 MG tablet   Yes No   Sig: Take 100 mg by mouth daily     ZINC OXIDE PO   Yes No   Sig: Take by mouth daily   acetaminophen (TYLENOL) 325 mg tablet   No No   Sig: Take 2 tablets (650 mg total) by mouth every 6 (six) hours as needed for mild pain   allopurinol (ZYLOPRIM) 100 mg tablet   Yes No   Sig: Take 100 mg by mouth 2 (two) times a day   ascorbic acid (VITAMIN C) 500 MG tablet   No No   Sig: Take 1 tablet (500 mg total) by mouth daily   atorvastatin (LIPITOR) 40 mg tablet   No No   Sig: Take 1 tablet (40 mg total) by mouth daily with dinner   carvedilol (COREG) 6 25 mg tablet   No No   Sig: Take 1 tablet (6 25 mg total) by mouth 2 (two) times a day with meals   cholecalciferol (VITAMIN D3) 1,000 units tablet   No No   Sig: Take 1 tablet (1,000 Units total) by mouth daily   Patient taking differently: Take 2,000 Units by mouth daily   glimepiride (AMARYL) 4 mg tablet   Yes No   Sig: Take 4 mg by mouth 2 (two) times a day   isosorbide mononitrate (IMDUR) 30 mg 24 hr tablet   No No   Sig: Take 1 tablet (30 mg total) by mouth daily   latanoprost (XALATAN) 0 005 % ophthalmic solution   Yes No   Si drop daily at bedtime   metolazone (ZAROXOLYN) 5 mg tablet   No No   Sig: Take 1 tablet (5 mg total) by mouth if needed (for weight gain more then % lbs)   timolol (TIMOPTIC) 0 5 % ophthalmic solution   No No   Sig: Administer 1 drop to both eyes daily   torsemide (DEMADEX) 20 mg tablet   No No   Sig: Take 1 tablet (20 mg total) by mouth daily   Patient taking differently: Take 40 mg by mouth daily      Facility-Administered Medications: None       Past Medical History:   Diagnosis Date   • Atrial fibrillation (HCC)    • Chronic kidney disease    • Coronary artery disease    • Diabetes mellitus ([facility] [address] )    • Hyperlipidemia    • Hypertension        Past Surgical History:   Procedure Laterality Date   • EYE SURGERY     • SKIN CANCER EXCISION     • TONSILLECTOMY         Family History   Problem Relation Age of Onset   • Heart disease Mother    • Diabetes Father    • Vision loss Father    • Cancer Sister    • Diabetes Sister      I have reviewed and agree with the history as documented      E-Cigarette/Vaping   • E-Cigarette Use Never User      E-Cigarette/Vaping Substances   • Nicotine No    • THC No    • CBD No    • Flavoring No    • Other No    • Unknown No      Social History     Tobacco Use   • Smoking status: Former   • Smokeless tobacco: Former   Vaping Use   • Vaping Use: Never used Substance Use Topics   • Alcohol use: Not Currently     Alcohol/week: 0 0 standard drinks     Comment: special occasions   • Drug use: Not Currently       Review of Systems   Constitutional: Positive for appetite change and fatigue  Negative for chills and fever  HENT: Negative for rhinorrhea and sore throat  Eyes: Negative for pain, redness and visual disturbance  Respiratory: Negative for cough and shortness of breath  Cardiovascular: Negative for chest pain and leg swelling  Gastrointestinal: Negative for abdominal pain, diarrhea and vomiting  Endocrine: Negative for polydipsia and polyuria  Genitourinary: Negative for dysuria, frequency and hematuria  Musculoskeletal: Negative for back pain and neck pain  Skin: Negative for rash and wound  Allergic/Immunologic: Negative for immunocompromised state  Neurological: Positive for dizziness and weakness  Negative for numbness and headaches  Psychiatric/Behavioral: Negative for hallucinations and suicidal ideas  All other systems reviewed and are negative  Physical Exam  Physical Exam  Vitals reviewed  Constitutional:       General: He is not in acute distress  Appearance: He is not toxic-appearing  HENT:      Head: Normocephalic and atraumatic  Nose: Nose normal       Mouth/Throat:      Mouth: Mucous membranes are moist    Eyes:      General:         Right eye: No discharge  Left eye: No discharge  Conjunctiva/sclera: Conjunctivae normal    Cardiovascular:      Rate and Rhythm: Normal rate and regular rhythm  Pulses: Normal pulses  Heart sounds: Normal heart sounds  No murmur heard  No friction rub  No gallop  Pulmonary:      Effort: Pulmonary effort is normal  No respiratory distress  Breath sounds: Normal breath sounds  No stridor  No wheezing, rhonchi or rales  Abdominal:      General: Bowel sounds are normal  There is no distension  Palpations: Abdomen is soft        Tenderness: There is no abdominal tenderness  There is no right CVA tenderness, left CVA tenderness, guarding or rebound  Musculoskeletal:         General: No swelling, tenderness, deformity or signs of injury  Normal range of motion  Cervical back: Normal range of motion and neck supple  No rigidity  Right lower leg: No edema  Left lower leg: No edema  Comments: No calf pain or unilateral leg swelling   Skin:     General: Skin is warm and dry  Coloration: Skin is not jaundiced or pale  Findings: No bruising, erythema or rash  Neurological:      General: No focal deficit present  Mental Status: He is alert  Cranial Nerves: No facial asymmetry  Sensory: Sensation is intact  No sensory deficit  Motor: Motor function is intact  No weakness     Psychiatric:         Mood and Affect: Mood normal          Behavior: Behavior normal          Vital Signs  ED Triage Vitals   Temperature Pulse Respirations Blood Pressure SpO2   02/22/23 1115 02/22/23 1115 02/22/23 1115 02/22/23 1115 02/22/23 1115   (!) 97 3 °F (36 3 °C) 83 18 105/55 99 %      Temp src Heart Rate Source Patient Position - Orthostatic VS BP Location FiO2 (%)   -- 02/22/23 1359 02/22/23 1359 02/22/23 1359 --    Monitor Lying Right arm       Pain Score       02/22/23 1115       No Pain           Vitals:    02/22/23 1115 02/22/23 1359   BP: 105/55 130/59   Pulse: 83 78   Patient Position - Orthostatic VS:  Lying         Visual Acuity      ED Medications  Medications   sodium chloride 0 9 % bolus 500 mL (500 mL Intravenous New Bag 2/22/23 1354)       Diagnostic Studies  Results Reviewed     Procedure Component Value Units Date/Time    TSH [[id number]]  (Normal) Collected: 02/22/23 1351    Lab Status: Final result Specimen: Blood from Arm, Right Updated: 02/22/23 1432     TSH 3RD GENERATON 1 808 uIU/mL     Narrative:      Patients undergoing fluorescein dye angiography may retain small amounts of fluorescein in the body for 48-72 hours post procedure  Samples containing fluorescein can produce falsely depressed TSH values  If the patient had this procedure,a specimen should be resubmitted post fluorescein clearance        HS Troponin 0hr (reflex protocol) [[id number]]  (Normal) Collected: 02/22/23 1351    Lab Status: Final result Specimen: Blood from Arm, Right Updated: 02/22/23 1424     hs TnI 0hr 20 ng/L     HS Troponin I 2hr [[id number]]     Lab Status: No result Specimen: Blood     Comprehensive metabolic panel [[id number]]  (Abnormal) Collected: 02/22/23 1351    Lab Status: Final result Specimen: Blood from Arm, Right Updated: 02/22/23 1417     Sodium 129 mmol/L      Potassium 3 5 mmol/L      Chloride 82 mmol/L      CO2 37 mmol/L      ANION GAP 10 mmol/L       mg/dL      Creatinine 3 62 mg/dL      Glucose 215 mg/dL      Calcium 8 7 mg/dL      AST 16 U/L      ALT 17 U/L      Alkaline Phosphatase 149 U/L      Total Protein 6 9 g/dL      Albumin 3 6 g/dL      Total Bilirubin 1 42 mg/dL      eGFR 14 ml/min/1 73sq m     Narrative:      [facility] guidelines for Chronic Kidney Disease (CKD):   •  Stage 1 with normal or high GFR (GFR > 90 mL/min/1 73 square meters)  •  Stage 2 Mild CKD (GFR = 60-89 mL/min/1 73 square meters)  •  Stage 3A Moderate CKD (GFR = 45-59 mL/min/1 73 square meters)  •  Stage 3B Moderate CKD (GFR = 30-44 mL/min/1 73 square meters)  •  Stage 4 Severe CKD (GFR = 15-29 mL/min/1 73 square meters)  •  Stage 5 End Stage CKD (GFR <15 mL/min/1 73 square meters)  Note: GFR calculation is accurate only with a steady state creatinine    Magnesium [[id number]]  (Normal) Collected: 02/22/23 1351    Lab Status: Final result Specimen: Blood from Arm, Right Updated: 02/22/23 1417     Magnesium 2 5 mg/dL     Phosphorus [[id number]]  (Normal) Collected: 02/22/23 1351    Lab Status: Final result Specimen: Blood from Arm, Right Updated: 02/22/23 1417     Phosphorus 3 6 mg/dL     CBC and differential [[id number]]  (Abnormal) Collected: 02/22/23 1351    Lab Status: Final result Specimen: Blood from Arm, Right Updated: 02/22/23 1359     WBC 7 47 Thousand/uL      RBC 4 11 Million/uL      Hemoglobin 13 7 g/dL      Hematocrit 40 8 %      MCV 99 fL      MCH 33 3 pg      MCHC 33 6 g/dL      RDW 14 8 %      MPV 10 2 fL      Platelets 628 Thousands/uL      nRBC 0 /100 WBCs      Neutrophils Relative 79 %      Immat GRANS % 0 %      Lymphocytes Relative 9 %      Monocytes Relative 12 %      Eosinophils Relative 0 %      Basophils Relative 0 %      Neutrophils Absolute 5 80 Thousands/µL      Immature Grans Absolute 0 03 Thousand/uL      Lymphocytes Absolute 0 67 Thousands/µL      Monocytes Absolute 0 92 Thousand/µL      Eosinophils Absolute 0 03 Thousand/µL      Basophils Absolute 0 02 Thousands/µL                  No orders to display              Procedures  ECG 12 Lead Documentation Only    Date/Time: 2/22/2023 1:56 PM  Performed by: Marquis Zachary MD  Authorized by: Marquis Zachary MD     ECG reviewed by me, the ED Provider: yes    Patient location:  ED  Comments:      Rate controlled atrial fibrillation with PVCs  Low voltage QRS  Nonspecific ST and T wave abnormality  No STEMI  Abnormal EKG  ED Course                               SBIRT 22yo+    Flowsheet Row Most Recent Value   SBIRT (23 yo +)    In order to provide better care to our patients, we are screening all of our patients for alcohol and drug use  Would it be okay to ask you these screening questions? Yes Filed at: 02/22/2023 1357   Initial Alcohol Screen: US AUDIT-C     1  How often do you have a drink containing alcohol? 0 Filed at: 02/22/2023 135   2  How many drinks containing alcohol do you have on a typical day you are drinking? 0 Filed at: 02/22/2023 1356   3b  FEMALE Any Age, or MALE 65+: How often do you have 4 or more drinks on one occassion?  0 Filed at: 02/22/2023 1358   Audit-C Score 0 Filed at: 02/22/2023 1359   ALEJANDRO: How many times in the past year have you    Used an illegal drug or used a prescription medication for non-medical reasons? Never Filed at: 02/22/2023 9060                    Medical Decision Making  Patient's BUN was in the markedly elevated  Most likely this is due to dehydration  Hemoglobin is normal   Doubt GI bleed despite anticoagulation use  He clinically appears dehydrated  History of decreased p o  intake  IV fluids were ordered  Patient was also found to be moderately hyponatremic  Insulted with hospitalist for admission  EKG showed rate controlled atrial fibrillation  Amount and/or Complexity of Data Reviewed  Independent Historian:      Details: Son  External Data Reviewed: labs and notes  Labs: ordered  Decision-making details documented in ED Course  ECG/medicine tests: ordered and independent interpretation performed  Decision-making details documented in ED Course  Discussion of management or test interpretation with external provider(s): Hospitalist    Risk  Decision regarding hospitalization            Disposition  Final diagnoses:   Prerenal azotemia   Hyponatremia     Time reflects when diagnosis was documented in both MDM as applicable and the Disposition within this note     Time User Action Codes Description Comment    2/22/2023  3:07 PM Ricardo Jorje Add [N17 9,  N18 9] Acute on chronic renal failure ([facility] [address] )     2/22/2023  3:07 PM Ricardo Jorje Add [N19] Prerenal uremia syndrome     2/22/2023  3:07 PM Ricardo Jorje Modify [N19] Prerenal uremia syndrome     2/22/2023  3:07 PM Ricardo Jorje Remove [N17 9,  N18 9] Acute on chronic renal failure ([facility] [address] )     2/22/2023  3:07 PM Ricardo Jorje Add [R79 89] Prerenal azotemia     2/22/2023  3:07 PM Ricardo Jorje Modify [R79 89] Prerenal azotemia     2/22/2023  3:07 PM Ricardo Jorje Remove [N19] Prerenal uremia syndrome     2/22/2023  3:07 PM Ricardo Jorje Add [E87 1] Hyponatremia       ED Disposition     ED Disposition Admit    Condition   Stable    Date/Time   Wed Feb 22, 2023  3:07 PM    Comment              Follow-up Information    None         Patient's Medications   Discharge Prescriptions    No medications on file       No discharge procedures on file      PDMP Review     None          ED Provider  Electronically Signed by           Lóen Mandel MD  02/22/23 7820

## 2023-02-22 NOTE — PLAN OF CARE
Problem: Potential for Falls  Goal: Patient will remain free of falls  Description: INTERVENTIONS:  - Educate patient/family on patient safety including physical limitations  - Instruct patient to call for assistance with activity   - Consult OT/PT to assist with strengthening/mobility   - Keep Call bell within reach  - Keep bed low and locked with side rails adjusted as appropriate  - Keep care items and personal belongings within reach  - Initiate and maintain comfort rounds  - Make Fall Risk Sign visible to staff  - Offer Toileting every   Hours, in advance of need  - Initiate/Maintain  alarm  - Obtain necessary fall risk management equipment:    - Apply yellow socks and bracelet for high fall risk patients  - Consider moving patient to room near nurses station  Outcome: Progressing     Problem: MOBILITY - ADULT  Goal: Maintain or return to baseline ADL function  Description: INTERVENTIONS:  -  Assess patient's ability to carry out ADLs; assess patient's baseline for ADL function and identify physical deficits which impact ability to perform ADLs (bathing, care of mouth/teeth, toileting, grooming, dressing, etc )  - Assess/evaluate cause of self-care deficits   - Assess range of motion  - Assess patient's mobility; develop plan if impaired  - Assess patient's need for assistive devices and provide as appropriate  - Encourage maximum independence but intervene and supervise when necessary  - Involve family in performance of ADLs  - Assess for home care needs following discharge   - Consider OT consult to assist with ADL evaluation and planning for discharge  - Provide patient education as appropriate  Outcome: Progressing  Goal: Maintains/Returns to pre admission functional level  Description: INTERVENTIONS:  - Perform BMAT or MOVE assessment daily    - Set and communicate daily mobility goal to care team and patient/family/caregiver     - Collaborate with rehabilitation services on mobility goals if consulted  - Perform Range of Motion   times a day  - Reposition patient every   hours    - Dangle patient  times a day  - Stand patient   times a day  - Ambulate patient   times a day  - Out of bed to chair   times a day   - Out of bed for meals   times a day  - Out of bed for toileting  - Record patient progress and toleration of activity level   Outcome: Progressing

## 2023-02-22 NOTE — H&P
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 ([place] [address] )    Chronic combined systolic and diastolic congestive heart failure (HCC)    Atrial fibrillation ([place] [address] )    Stage 4 chronic kidney disease ([place] [address] )    Hyponatremia    Essential hypertension      Assessment/Plan:    * Acute kidney injury superimposed on chronic kidney disease ([facility] 75 )  Assessment & Plan  Referred to ED with worsening of renal function on outpatient blood test, BUN/creatinine- 116/4 0  Repeat blood test in ED, BUNs/creatinine 125/3 6  Suspect prerenal azotemia in setting of poor p o  intake, diuretic use  · Check UA  · Bladder scan  · Hold torsemide  · Monitor blood pressure  · IV NS  · Monitor intake output  · Avoid nephrotoxins/hypotension  · Consider ultrasound of kidneys  · Nephrology evaluation    Hyponatremia  Assessment & Plan  Sodium 129  Monitor with IV NS    Stage 4 chronic kidney disease (HCC)  Assessment & Plan  History of CKD stage IV, baseline creatinine 2  5-3  Suspected to be secondary to diabetic nephropathy, hypertensive nephrosclerosis and renovascular disease  Follows with nephrology        Atrial fibrillation [facility])  Assessment & Plan  History of chronic A-fib, controlled, continue carvedilol, Eliquis    Chronic combined systolic and diastolic congestive heart failure (HCC)  Assessment & Plan  Wt Readings from Last 3 Encounters:   02/22/23 67 1 kg (148 lb)   02/09/23 67 1 kg (148 lb)   11/07/22 70 8 kg (156 lb)     Echocardiogram-EF 87-51%, grade 2 diastolic dysfunction, moderate concentric hypertrophy  History of pericardial effusion  Currently appears somewhat volume depleted  · Hold torsemide  · Monitor intake output  · Daily weight      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 [facility])  Assessment & Plan  Lab Results   Component Value Date    HGBA1C 8 5 10/18/2022       Recent Labs     02/22/23 2045   POCGLU 359*       Blood Sugar Average: Last 72 hrs:  (P) 359     Check hemoglobin A1c  Hold glimepiride, Januvia  Insulin sliding scale  Monitor p o  intake    Essential hypertension  Assessment & Plan  Stable  Monitor on carvedilol          VTE Prophylaxis: Apixaban (Eliquis)  / sequential compression device   Code Status: Level 1 - Full Code    Anticipated Length of Stay:  Patient will be admitted on an Inpatient basis with an anticipated length of stay of  >2 midnights  Justification for Hospital Stay: *Acute kidney injury on CKD stage IV    Total Time for Visit, including Counseling / Coordination of Care: 45 minutes  Greater than 50% of this total time spent on direct patient counseling and coordination of care  Discussed with son at bedside    Chief Complaint:     Abnormal Lab (States had blood work done yesterday and told to come in for elevated BUN and Mag level was off)    History of Present Illness:    Armani Moran is a 78 y o  male with history of CKD stage IV, diabetes mellitus type 2, hypertension, dyslipidemia, CAD, chronic A-fib, CHF (chronic combined CHF, EF 40%) who went to ED when he was found to have abnormal blood test   Patient had routine blood work done and was noted to have elevated creatinine from baseline, hyponatremia and hypermagnesemia  Patient did not repeat any complaints but son reported that patient is increasingly weak fatigued with poor appetite over last few weeks  In ED, vital signs were stable, saturating adequately on room air  Lab in ED revealed sodium of 129, creatinine of 3 6 and BUN of 125    Hemoglobin was stable  Cardiac markers were unremarkable  Patient received 500 cc of IV fluid and was subsequently admitted       Review of Systems:    Review of Systems   Unable to perform ROS: Dementia (Patient poor historian)   Constitutional: Positive for activity change, appetite change and fatigue  Negative for fever  HENT: Negative for sore throat and trouble swallowing  Respiratory: Negative for shortness of breath  Cardiovascular: Negative for chest pain and palpitations  Gastrointestinal: Negative for abdominal pain, blood in stool, diarrhea, nausea and vomiting  Genitourinary: Negative for difficulty urinating  Musculoskeletal: Positive for gait problem  Neurological: Negative for dizziness and headaches  Psychiatric/Behavioral: Negative for behavioral problems  Past Medical and Surgical History:     Past Medical History:   Diagnosis Date   • Atrial fibrillation ([facility] 75 )    • Chronic kidney disease    • Coronary artery disease    • Diabetes mellitus ([facility] 75 )    • Hyperlipidemia    • Hypertension        Past Surgical History:   Procedure Laterality Date   • EYE SURGERY     • SKIN CANCER EXCISION     • TONSILLECTOMY         Meds/Allergies:    PTA meds:   Prior to Admission Medications   Prescriptions Last Dose Informant Patient Reported? Taking?    ALPRAZolam (XANAX) 0 5 mg tablet   Yes No   Si 5 mg daily at bedtime    Blood Pressure Monitor NILTON   No No   Sig: by Does not apply route daily   Eliquis 2 5 MG   No No   Sig: TAKE ONE TABLET BY MOUTH TWICE A DAY   Januvia 100 MG tablet   Yes No   Sig: Take 100 mg by mouth daily     ZINC OXIDE PO   Yes No   Sig: Take by mouth daily   acetaminophen (TYLENOL) 325 mg tablet   No No   Sig: Take 2 tablets (650 mg total) by mouth every 6 (six) hours as needed for mild pain   allopurinol (ZYLOPRIM) 100 mg tablet   Yes No   Sig: Take 100 mg by mouth 2 (two) times a day   ascorbic acid (VITAMIN C) 500 MG tablet   No No   Sig: Take 1 tablet (500 mg total) by mouth daily   atorvastatin (LIPITOR) 40 mg tablet   No No   Sig: Take 1 tablet (40 mg total) by mouth daily with dinner   carvedilol (COREG) 6 25 mg tablet   No No   Sig: Take 1 tablet (6 25 mg total) by mouth 2 (two) times a day with meals   cholecalciferol (VITAMIN D3) 1,000 units tablet   No No   Sig: Take 1 tablet (1,000 Units total) by mouth daily   Patient taking differently: Take 2,000 Units by mouth daily   glimepiride (AMARYL) 4 mg tablet   Yes No   Sig: Take 4 mg by mouth 2 (two) times a day   isosorbide mononitrate (IMDUR) 30 mg 24 hr tablet   No No   Sig: Take 1 tablet (30 mg total) by mouth daily   latanoprost (XALATAN) 0 005 % ophthalmic solution   Yes No   Si drop daily at bedtime   metolazone (ZAROXOLYN) 5 mg tablet   No No   Sig: Take 1 tablet (5 mg total) by mouth if needed (for weight gain more then % lbs)   timolol (TIMOPTIC) 0 5 % ophthalmic solution   No No   Sig: Administer 1 drop to both eyes daily   torsemide (DEMADEX) 20 mg tablet   No No   Sig: Take 1 tablet (20 mg total) by mouth daily   Patient taking differently: Take 40 mg by mouth daily      Facility-Administered Medications: None       Allergies: Allergies   Allergen Reactions   • Elemental Sulfur    • Sulfa Antibiotics      History:     Marital Status:       Substance Use History:   Social History     Substance and Sexual Activity   Alcohol Use Not Currently   • Alcohol/week: 0 0 standard drinks    Comment: special occasions     Social History     Tobacco Use   Smoking Status Former   Smokeless Tobacco Former     Social History     Substance and Sexual Activity   Drug Use Not Currently       Family History:    Family History   Problem Relation Age of Onset   • Heart disease Mother    • Diabetes Father    • Vision loss Father    • Cancer Sister    • Diabetes Sister        Physical Exam:     Vitals:   Blood Pressure: 129/63 (23 1642)  Pulse: 89 (23 1642)  Temperature: 97 5 °F (36 4 °C) (23)  Temp Source: Oral (02/22/23 1642)  Respirations: 19 (02/22/23 1642)  Height: 5' 9" (175 3 cm) (02/22/23 1115)  Weight - Scale: 67 1 kg (148 lb) (02/22/23 1115)  SpO2: 98 % (02/22/23 1642)    Physical Exam  Constitutional:       General: He is not in acute distress  Comments: Elderly, frail   HENT:      Head: Normocephalic and atraumatic  Mouth/Throat:      Mouth: Mucous membranes are dry  Comments: Dry mucosa  Cardiovascular:      Rate and Rhythm: Normal rate  Rhythm irregular  Pulmonary:      Effort: Pulmonary effort is normal  No respiratory distress  Breath sounds: No wheezing, rhonchi or rales  Comments: Diminished bilaterally  Chest:      Chest wall: No tenderness  Abdominal:      General: Bowel sounds are normal  There is no distension  Palpations: Abdomen is soft  Tenderness: There is no abdominal tenderness  There is no guarding or rebound  Musculoskeletal:      Comments: Lower extremity chronic venous stasis   Skin:     General: Skin is warm and dry  Findings: No rash  Neurological:      Mental Status: He is alert  Mental status is at baseline  GCS: GCS eye subscore is 4  GCS verbal subscore is 4  GCS motor subscore is 6  Cranial Nerves: No cranial nerve deficit  Psychiatric:         Cognition and Memory: Cognition is impaired  Lab Results: I have personally reviewed pertinent reports  Results from last 7 days   Lab Units 02/22/23  1351   WBC Thousand/uL 7 47   HEMOGLOBIN g/dL 13 7   HEMATOCRIT % 40 8   PLATELETS Thousands/uL 111*   NEUTROS PCT % 79*   LYMPHS PCT % 9*   MONOS PCT % 12   EOS PCT % 0     Results from last 7 days   Lab Units 02/22/23  1351   POTASSIUM mmol/L 3 5   CHLORIDE mmol/L 82*   CO2 mmol/L 37*   BUN mg/dL 125*   CREATININE mg/dL 3 62*   CALCIUM mg/dL 8 7   ALK PHOS U/L 149*   ALT U/L 17   AST U/L 16           Imaging: None    No results found      No orders to display       EKG, Pathology, and Other Studies Reviewed on Admission:   · EKG days controlled A-fib with PVCs  Allscripts/EPIC Records Reviewed: No     ** Please Note: "This note has been constructed using a voice recognition system  Therefore there may be syntax, spelling, and/or grammatical errors   Please call if you have any questions  "**

## 2023-02-23 ENCOUNTER — APPOINTMENT (INPATIENT)
Dept: RADIOLOGY | Facility: HOSPITAL | Age: 83
End: 2023-02-23

## 2023-02-23 LAB
ANION GAP SERPL CALCULATED.3IONS-SCNC: 10 MMOL/L (ref 4–13)
BACTERIA UR QL AUTO: NORMAL /HPF
BILIRUB UR QL STRIP: NEGATIVE
BUN SERPL-MCNC: 112 MG/DL (ref 5–25)
CALCIUM SERPL-MCNC: 8.7 MG/DL (ref 8.4–10.2)
CHLORIDE SERPL-SCNC: 89 MMOL/L (ref 96–108)
CLARITY UR: CLEAR
CO2 SERPL-SCNC: 35 MMOL/L (ref 21–32)
COLOR UR: ABNORMAL
CREAT SERPL-MCNC: 3.28 MG/DL (ref 0.6–1.3)
ERYTHROCYTE [DISTWIDTH] IN BLOOD BY AUTOMATED COUNT: 14.7 % (ref 11.6–15.1)
EST. AVERAGE GLUCOSE BLD GHB EST-MCNC: 192 MG/DL
GFR SERPL CREATININE-BSD FRML MDRD: 16 ML/MIN/1.73SQ M
GLUCOSE SERPL-MCNC: 133 MG/DL (ref 65–140)
GLUCOSE SERPL-MCNC: 176 MG/DL (ref 65–140)
GLUCOSE SERPL-MCNC: 260 MG/DL (ref 65–140)
GLUCOSE SERPL-MCNC: 273 MG/DL (ref 65–140)
GLUCOSE SERPL-MCNC: 43 MG/DL (ref 65–140)
GLUCOSE SERPL-MCNC: 64 MG/DL (ref 65–140)
GLUCOSE UR STRIP-MCNC: ABNORMAL MG/DL
HBA1C MFR BLD: 8.3 %
HCT VFR BLD AUTO: 41.3 % (ref 36.5–49.3)
HGB BLD-MCNC: 13.9 G/DL (ref 12–17)
HGB UR QL STRIP.AUTO: ABNORMAL
KETONES UR STRIP-MCNC: NEGATIVE MG/DL
LEUKOCYTE ESTERASE UR QL STRIP: ABNORMAL
MAGNESIUM SERPL-MCNC: 2.5 MG/DL (ref 1.9–2.7)
MCH RBC QN AUTO: 33.2 PG (ref 26.8–34.3)
MCHC RBC AUTO-ENTMCNC: 33.7 G/DL (ref 31.4–37.4)
MCV RBC AUTO: 99 FL (ref 82–98)
NITRITE UR QL STRIP: NEGATIVE
NON-SQ EPI CELLS URNS QL MICRO: NORMAL /HPF
PH UR STRIP.AUTO: 6.5 [PH]
PHOSPHATE SERPL-MCNC: 3 MG/DL (ref 2.3–4.1)
PLATELET # BLD AUTO: 139 THOUSANDS/UL (ref 149–390)
PMV BLD AUTO: 10 FL (ref 8.9–12.7)
POTASSIUM SERPL-SCNC: 3.1 MMOL/L (ref 3.5–5.3)
PROT UR STRIP-MCNC: NEGATIVE MG/DL
RBC # BLD AUTO: 4.19 MILLION/UL (ref 3.88–5.62)
RBC #/AREA URNS AUTO: NORMAL /HPF
SODIUM SERPL-SCNC: 134 MMOL/L (ref 135–147)
SP GR UR STRIP.AUTO: <=1.005 (ref 1–1.03)
UROBILINOGEN UR QL STRIP.AUTO: 0.2 E.U./DL
WBC # BLD AUTO: 7.85 THOUSAND/UL (ref 4.31–10.16)
WBC #/AREA URNS AUTO: NORMAL /HPF

## 2023-02-23 RX ORDER — POTASSIUM CHLORIDE 20 MEQ/1
40 TABLET, EXTENDED RELEASE ORAL ONCE
Status: COMPLETED | OUTPATIENT
Start: 2023-02-23 | End: 2023-02-23

## 2023-02-23 RX ADMIN — ALPRAZOLAM 0.25 MG: 0.25 TABLET ORAL at 21:39

## 2023-02-23 RX ADMIN — POTASSIUM CHLORIDE 40 MEQ: 1500 TABLET, EXTENDED RELEASE ORAL at 09:44

## 2023-02-23 RX ADMIN — CARVEDILOL 6.25 MG: 6.25 TABLET, FILM COATED ORAL at 16:21

## 2023-02-23 RX ADMIN — CHOLECALCIFEROL TAB 25 MCG (1000 UNIT) 2000 UNITS: 25 TAB at 09:05

## 2023-02-23 RX ADMIN — INSULIN LISPRO 2 UNITS: 100 INJECTION, SOLUTION INTRAVENOUS; SUBCUTANEOUS at 16:20

## 2023-02-23 RX ADMIN — INSULIN LISPRO 1 UNITS: 100 INJECTION, SOLUTION INTRAVENOUS; SUBCUTANEOUS at 21:39

## 2023-02-23 RX ADMIN — ATORVASTATIN CALCIUM 40 MG: 40 TABLET, FILM COATED ORAL at 16:17

## 2023-02-23 RX ADMIN — LATANOPROST 1 DROP: 50 SOLUTION OPHTHALMIC at 21:39

## 2023-02-23 RX ADMIN — INSULIN LISPRO 2 UNITS: 100 INJECTION, SOLUTION INTRAVENOUS; SUBCUTANEOUS at 12:08

## 2023-02-23 RX ADMIN — ISOSORBIDE MONONITRATE 30 MG: 30 TABLET, EXTENDED RELEASE ORAL at 08:51

## 2023-02-23 RX ADMIN — TIMOLOL MALEATE 1 DROP: 5 SOLUTION OPHTHALMIC at 09:04

## 2023-02-23 RX ADMIN — ZINC SULFATE 220 MG (50 MG) CAPSULE 220 MG: CAPSULE at 08:52

## 2023-02-23 RX ADMIN — CARVEDILOL 6.25 MG: 6.25 TABLET, FILM COATED ORAL at 08:52

## 2023-02-23 RX ADMIN — ALLOPURINOL 100 MG: 100 TABLET ORAL at 17:42

## 2023-02-23 RX ADMIN — SODIUM CHLORIDE 50 ML/HR: 0.9 INJECTION, SOLUTION INTRAVENOUS at 21:47

## 2023-02-23 RX ADMIN — APIXABAN 2.5 MG: 2.5 TABLET, FILM COATED ORAL at 08:52

## 2023-02-23 RX ADMIN — SODIUM CHLORIDE 50 ML/HR: 0.9 INJECTION, SOLUTION INTRAVENOUS at 23:35

## 2023-02-23 RX ADMIN — APIXABAN 2.5 MG: 2.5 TABLET, FILM COATED ORAL at 17:42

## 2023-02-23 RX ADMIN — ALLOPURINOL 100 MG: 100 TABLET ORAL at 08:53

## 2023-02-23 RX ADMIN — OXYCODONE HYDROCHLORIDE AND ACETAMINOPHEN 500 MG: 500 TABLET ORAL at 08:52

## 2023-02-23 NOTE — ASSESSMENT & PLAN NOTE
Referred to ED with worsening of renal function on outpatient blood test, BUN/creatinine- 116/4 0  Repeat blood test in ED, BUNs/creatinine 125/3 6  UA- trace blood, 1-2 RBC  Suspect prerenal azotemia in setting of poor p o  intake, diuretic use  Also postrenal secondary to urinary retention  Bladder scan more than 300 cc status post straight cath with with 700 cc of urine  Dupont catheter was placed  Ultrasound of the kidney was done which revealed atrophic echogenic kidney consistent with medical renal disease, no hydronephrosis  Asymmetric urinary bladder wall thickening noted involving anterior superior right aspect of the bladder    Irregular shape of the urinary bladder noted, physiologic versus Wide neck right-sided bladder diverticulum  Nephrology following, input appreciated  Creatinine is downtrending to baseline 2 8 s/p IV fluid and Dupont catheter placement  Creatinine remains at baseline  · Resuming torsemide 20 mg alternating with 40 mg  · Monitor blood pressure  · Follow-up BMP in 1 week  · Continue Dupont  · Monitor intake output  · Avoid nephrotoxins/hypotension  · Nephrology follow-up after discharge

## 2023-02-23 NOTE — PLAN OF CARE
Problem: Potential for Falls  Goal: Patient will remain free of falls  Description: INTERVENTIONS:  - Educate patient/family on patient safety including physical limitations  - Instruct patient to call for assistance with activity   - Consult OT/PT to assist with strengthening/mobility   - Keep Call bell within reach  - Keep bed low and locked with side rails adjusted as appropriate  - Keep care items and personal belongings within reach  - Initiate and maintain comfort rounds  - Make Fall Risk Sign visible to staff  - Offer Toileting every 2 Hours, in advance of need  - Initiate/Maintain bed alarm  - Apply yellow socks and bracelet for high fall risk patients  - Consider moving patient to room near nurses station  Outcome: Progressing     Problem: MOBILITY - ADULT  Goal: Maintain or return to baseline ADL function  Description: INTERVENTIONS:  -  Assess patient's ability to carry out ADLs; assess patient's baseline for ADL function and identify physical deficits which impact ability to perform ADLs (bathing, care of mouth/teeth, toileting, grooming, dressing, etc )  - Assess/evaluate cause of self-care deficits   - Assess range of motion  - Assess patient's mobility; develop plan if impaired  - Assess patient's need for assistive devices and provide as appropriate  - Encourage maximum independence but intervene and supervise when necessary  - Involve family in performance of ADLs  - Assess for home care needs following discharge   - Consider OT consult to assist with ADL evaluation and planning for discharge  - Provide patient education as appropriate  Outcome: Progressing  Goal: Maintains/Returns to pre admission functional level  Description: INTERVENTIONS:  - Perform BMAT or MOVE assessment daily    - Set and communicate daily mobility goal to care team and patient/family/caregiver  - Collaborate with rehabilitation services on mobility goals if consulted  - Perform Range of Motion 3 times a day    - Reposition patient every 2 hours    - Dangle patient 3 times a day  - Stand patient 3 times a day  - Ambulate patient 3 times a day  - Out of bed to chair 2 times a day   -- Out of bed for toileting  - Record patient progress and toleration of activity level   Outcome: Progressing

## 2023-02-23 NOTE — ASSESSMENT & PLAN NOTE
History of CKD stage IV, baseline creatinine 2  5-3  Suspected to be secondary to diabetic nephropathy, hypertensive nephrosclerosis and renovascular disease  Follows with nephrology

## 2023-02-23 NOTE — ASSESSMENT & PLAN NOTE
Lab Results   Component Value Date    HGBA1C 8 3 (H) 02/23/2023       Recent Labs     02/26/23  2048 02/27/23  0712 02/27/23  1132 02/27/23  1541   POCGLU 350* 349* 225* 223*       Blood Sugar Average: Last 72 hrs:  (P) 621 [id number]     No further  hypoglycemia, postprandial hyperglycemia noted  Resumed Januvia at 25 mg daily   Blood glucose trend noted still with postprandial hyperglycemia during daytime  Will increase glimepiride to 4 mg every morning and 2 mg every afternoon  Advised to monitor blood glucose daily 2-4 times a day and follow-up with

## 2023-02-23 NOTE — ASSESSMENT & PLAN NOTE
Sodium 129 on admission  Initially improved with IV fluid, now downtrending again back to 129 Urine sodium 20, urine osmolality 441  Uric acid 5 2, TSH normal, cortisol 6 8, serum osmolality 306  Corrected sodium level improved to 132-134  · Fluid restriction  · Resuming torsemide  · Cosyntropin stimulation test without any evidence of adrenal insufficiency  · Follow-up SPEP, UPEP, light chain ratio  · BMP in 1 week  · Follow-up with nephrology

## 2023-02-23 NOTE — NURSING NOTE
PVR was 347ml, Straight cath patient per order and drained out 700ml yellow color urine with tolerated well

## 2023-02-23 NOTE — CONSULTS
H&P Exam - Urology       Patient: Meseret Keenan   : 1940 Sex: male   MRN: [id number]     CSN: [id number]      History of Present Illness   HPI:  Meseret Keenan is a 78 y o  male who presents with worsening renal failure, hyponatremia, stage IV kidney disease found on postvoid residuals to have over 70 cc straight cathed twice urologic consult called for seen at the bedside patient states that he only voids once or twice a day with a slow stream getting up once at night  He has never seen a urologist         Review of Systems:   Constitutional:  Negative for activity change, fever, chills and diaphoresis  HENT: Negative for hearing loss and trouble swallowing  Eyes: Negative for itching and visual disturbance  Respiratory: Negative for chest tightness and shortness of breath  Cardiovascular: Negative for chest pain, edema  Gastrointestinal: Negative for abdominal distention, na abdominal pain, constipation, diarrhea, Nausea and vomiting  Genitourinary: Negative for decreased urine volume, difficulty urinating, dysuria, enuresis, frequency, hematuria and urgency  Musculoskeletal: Negative for gait problem and myalgias  Neurological: Negative for dizziness and headaches  Hematological: Does not bruise/bleed easily         Historical Information   Past Medical History:   Diagnosis Date   • Atrial fibrillation ([facility] )    • Chronic kidney disease    • Coronary artery disease    • Diabetes mellitus ([facility] 75 )    • Hyperlipidemia    • Hypertension      Past Surgical History:   Procedure Laterality Date   • EYE SURGERY     • SKIN CANCER EXCISION     • TONSILLECTOMY       Social History   Social History     Substance and Sexual Activity   Alcohol Use Not Currently   • Alcohol/week: 0 0 standard drinks    Comment: special occasions     Social History     Substance and Sexual Activity   Drug Use Not Currently     Social History     Tobacco Use   Smoking Status Former   Smokeless Tobacco Former     Family History:   Family History   Problem Relation Age of Onset   • Heart disease Mother    • Diabetes Father    • Vision loss Father    • Cancer Sister    • Diabetes Sister        Meds/Allergies   Medications Prior to Admission   Medication   • allopurinol (ZYLOPRIM) 100 mg tablet   • ALPRAZolam (XANAX) 0 5 mg tablet   • ascorbic acid (VITAMIN C) 500 MG tablet   • atorvastatin (LIPITOR) 40 mg tablet   • carvedilol (COREG) 6 25 mg tablet   • cholecalciferol (VITAMIN D3) 1,000 units tablet   • Eliquis 2 5 MG   • glimepiride (AMARYL) 4 mg tablet   • isosorbide mononitrate (IMDUR) 30 mg 24 hr tablet   • Januvia 100 MG tablet   • latanoprost (XALATAN) 0 005 % ophthalmic solution   • timolol (TIMOPTIC) 0 5 % ophthalmic solution   • torsemide (DEMADEX) 20 mg tablet   • ZINC OXIDE PO   • acetaminophen (TYLENOL) 325 mg tablet   • Blood Pressure Monitor NILTON   • metolazone (ZAROXOLYN) 5 mg tablet     Allergies   Allergen Reactions   • Elemental Sulfur    • Sulfa Antibiotics        Objective   Vitals: /85 (BP Location: Right arm)   Pulse 86   Temp (!) 97 3 °F (36 3 °C) (Temporal)   Resp 17   Ht 5' 9" (1 753 m)   Wt 67 1 kg (148 lb)   SpO2 95%   BMI 21 86 kg/m²     Physical Exam:  General Alert awake   Normocephalic atraumatic PERRLA  Lungs clear bilaterally  Cardiac normal S1 normal S2  Abdomen soft, flank pain none  Distended bladder  2 normal testicles  No hernias  Rectal exam deferred  Extremities no edema    I/O last 24 hours:   In: 500 [IV Piggyback:500]  Out: 1000 [Urine:1000]    Invasive Devices     Peripheral Intravenous Line  Duration           Peripheral IV 02/22/23 Right Forearm 1 day                    Lab Results: CBC:   Lab Results   Component Value Date    WBC 7 85 02/23/2023    HGB 13 9 02/23/2023    HCT 41 3 02/23/2023    MCV 99 (H) 02/23/2023     (L) 02/23/2023    MCH 33 2 02/23/2023    MCHC 33 7 02/23/2023    RDW 14 7 02/23/2023    MPV 10 0 02/23/2023    NRBC 0 02/22/2023     CMP:   Lab Results Component Value Date    CL 89 (L) 02/23/2023    CO2 35 (H) 02/23/2023     (H) 02/23/2023    CREATININE 3 28 (H) 02/23/2023    CALCIUM 8 7 02/23/2023    AST 16 02/22/2023    ALT 17 02/22/2023    ALKPHOS 149 (H) 02/22/2023    EGFR 16 02/23/2023     Urinalysis:   Lab Results   Component Value Date    COLORU Light Yellow 02/23/2023    CLARITYU Clear 02/23/2023    SPECGRAV <=1 005 02/23/2023    PHUR 6 5 02/23/2023    PHUR 5 0 07/03/2019    PHUR 5 5 01/10/2019    LEUKOCYTESUR Small (A) 02/23/2023    NITRITE Negative 02/23/2023    GLUCOSEU 500 (1/2%) (A) 02/23/2023    KETONESU Negative 02/23/2023    BILIRUBINUR Negative 02/23/2023    BLOODU Trace-Intact (A) 02/23/2023     Urine Culture: No results found for: URINECX  PSA: No results found for: PSA        Assessment/ Plan:  Urinary retention  Acute renal failure on chronic renal failure could be related to post renal obstruction  Diabetic hypocontractile bladder  Plan 16 [place] Dupont  Renal ultrasound check upper tracts  If patient has hydronephrosis not a candidate for alpha-blocker would need urodynamics/cystoscopy done as outpatient to evaluate for possible prostatectomy or chronic catheter      Vinny John MD

## 2023-02-23 NOTE — ASSESSMENT & PLAN NOTE
Wt Readings from Last 3 Encounters:   02/22/23 67 1 kg (148 lb)   02/09/23 67 1 kg (148 lb)   11/07/22 70 8 kg (156 lb)     Echocardiogram-EF 86-33%, grade 2 diastolic dysfunction, moderate concentric hypertrophy  History of pericardial effusion  Currently appears close to euvolemia  · Resuming torsemide as above  · Monitor intake output  · Daily weight

## 2023-02-23 NOTE — CONSULTS
Consultation - Nephrology   Lisa Tian 78 y o  male MRN: [id number]  Unit/Bed#: [address] [place] [address] Encounter: [id number]    ASSESSMENT and PLAN:  Acute kidney injury, POA  -Baseline creatinine: 2 5-3 0 mg/dl  -Admission creatinine: 3 62 mg/dl  Outpatient labs from 2/21/2023 showed creatinine 4 05 with sodium of 126  - Work up:   · UA with microscopy: UA with trace blood  1-2 RBC per high-power field  · Imaging: No renal imaging   -Etiology: Acute kidney injury likely due to prerenal azotemia/volume depletion in the setting of poor oral intake and diuretic use  -Hospital Course: Renal function already improving with IV fluids  Bladder scan was negative  -Plan:   · Renal function improving with creatinine trending down to 3 28 mg/dL with IV hydration  Discussed with primary team that as renal function improving would continue current hydration with IV normal saline at 50 mL/h  Expect renal function to be at baseline by tomorrow, hold diuretics  Clinically appears euvolemic, primary team agreed with the plan and continuing IV fluids  · Hold off on kidney ultrasound as renal function already improving  · Avoid nephrotoxins and dose all medications per EGFR  · Avoid hypotension  Chronic Kidney Disease Stage 4  -Outpatient Nephrologist: Dr Hector Bishop  -Baseline Creatinine: 2 5-3 0  -Etiology: Suspected secondary to diabetic nephropathy, hypertensive nephrosclerosis, arteriolar nephrosclerosis and renovascular disease  -Avoid Nephrotoxins and Dose all medications per eGFR  -Will need outpatient follow up after discharge  Chronic systolic CHF with ejection fraction 40%  -Clinically appears euvolemic, likely had volume depletion prior to admission    As per outpatient note from February, weight was at high 140s and was on torsemide 40 mg daily with metolazone as needed  -Hold diuretics as mentioned above in the setting of acute kidney injury and monitor volume status closely with patient receiving IV fluids, hopefully will stop IV fluids tomorrow and start back on lower dose of torsemide    Hypokalemia: Potassium 3 1, replaced with oral potassium chloride 40 mEq    Hyponatremia: Admission sodium was 129 on 2/22  -Sodium level improving with IV hydration to 134 meq/L which is appropriate rate of increase  -Hyponatremia likely from intravascular volume depletion plus poor oral intake and improving with IV fluids, continue to monitor discussed with primary team who agreed with the plan    Elevated bicarb  -Bicarb level elevated at 35 likely due to intravascular volume depletion  -Monitor response to IV fluids, bicarb level improving    BP primary hypertension  -Home Medication: Include Coreg, Imdur, torsemide  -Currently BP stable and is at goal, continue current treatment with Coreg Imdur but hold torsemide in the setting of acute kidney injury    Hypomagnesemia on outpatient labs, magnesium level now at normal range at 2 5, continue to monitor    Others: A-fib/diabetes mellitus type 2-management per primary team       HISTORY OF PRESENT ILLNESS:  Requesting Physician: Lydia Bowman MD  Reason for Consult: Acute kidney injury    Temple Opitz is a 78 y o  male with history of diabetes mellitus, hypertension, hyperlipidemia, A-fib, chronic kidney disease stage IV, CHF with ejection fraction 40% who was admitted to [address] [place] [address] after presenting with outpatient blood work showing elevated creatinine to 4 05 milligrams per deciliter, was also found to have hyponatremia with a sodium of 126 and elevated magnesium level  Patient denies any complaints but as per H&P son mention patient has been more fatigue and poor appetite over last few weeks, no history of NSS intake or diarrhea  Blood work done in the ED showed sodium 129 and creatinine 3 6 with BUN 25, patient was given 500 mL of IV fluid and started on IV fluids and admitted    Nephrology consulted for acute kidney injury    PAST MEDICAL HISTORY:  Past Medical History:   Diagnosis Date   • Atrial fibrillation ([facility] )    • Chronic kidney disease    • Coronary artery disease    • Diabetes mellitus ([facility] )    • Hyperlipidemia    • Hypertension        PAST SURGICAL HISTORY:  Past Surgical History:   Procedure Laterality Date   • EYE SURGERY     • SKIN CANCER EXCISION     • TONSILLECTOMY         SOCIAL HISTORY:  Social History     Substance and Sexual Activity   Alcohol Use Not Currently   • Alcohol/week: 0 0 standard drinks    Comment: special occasions     Social History     Substance and Sexual Activity   Drug Use Not Currently     Social History     Tobacco Use   Smoking Status Former   Smokeless Tobacco Former       FAMILY HISTORY:  Family History   Problem Relation Age of Onset   • Heart disease Mother    • Diabetes Father    • Vision loss Father    • Cancer Sister    • Diabetes Sister        ALLERGIES:  Allergies   Allergen Reactions   • Elemental Sulfur    • Sulfa Antibiotics        MEDICATIONS:    Current Facility-Administered Medications:   •  acetaminophen (TYLENOL) tablet 650 mg, 650 mg, Oral, Q6H PRN, Nikolai Barakat MD  •  allopurinol (ZYLOPRIM) tablet 100 mg, 100 mg, Oral, BID, Nikolai Barakat MD, 100 mg at 02/23/23 0853  •  ALPRAZolam (XANAX) tablet 0 25 mg, 0 25 mg, Oral, HS, Nikolai Barakat MD, 0 25 mg at 02/22/23 2211  •  apixaban (ELIQUIS) tablet 2 5 mg, 2 5 mg, Oral, BID, Nikolai Barakat MD, 2 5 mg at 02/23/23 7326  •  ascorbic acid (VITAMIN C) tablet 500 mg, 500 mg, Oral, Daily, Nikolai Barakat MD, 500 mg at 02/23/23 2085  •  atorvastatin (LIPITOR) tablet 40 mg, 40 mg, Oral, Daily With Marisel Watson MD, 40 mg at 02/22/23 2211  •  carvedilol (COREG) tablet 6 25 mg, 6 25 mg, Oral, BID With Meals, Nikolai Barakat MD, 6 25 mg at 02/23/23 8307  •  cholecalciferol (VITAMIN D3) tablet 2,000 Units, 2,000 Units, Oral, Daily, Nikolai Barakat MD, 2,000 Units at 02/23/23 0905  •  insulin lispro (HumaLOG) 100 units/mL subcutaneous injection 1-5 Units, 1-5 Units, Subcutaneous, TID AC **AND** Fingerstick Glucose (POCT), , , TID AC, Hannah Osorio MD  •  insulin lispro (HumaLOG) 100 units/mL subcutaneous injection 1-5 Units, 1-5 Units, Subcutaneous, HS, Hannah Osorio MD, 4 Units at 02/22/23 2214  •  isosorbide mononitrate (IMDUR) 24 hr tablet 30 mg, 30 mg, Oral, Daily, Hannah Osorio MD, 30 mg at 02/23/23 0851  •  latanoprost (XALATAN) 0 005 % ophthalmic solution 1 drop, 1 drop, Both Eyes, HS, Hannah Osorio MD, 1 drop at 02/22/23 2212  •  sodium chloride 0 9 % infusion, 50 mL/hr, Intravenous, Continuous, Hannah Osorio MD, Last Rate: 50 mL/hr at 02/22/23 2213, 50 mL/hr at 02/22/23 2213  •  timolol (TIMOPTIC) 0 5 % ophthalmic solution 1 drop, 1 drop, Both Eyes, Daily, Hannah Osorio MD, 1 drop at 02/23/23 7118  •  zinc sulfate (ZINCATE) capsule 220 mg, 220 mg, Oral, Daily, Hannah Osorio MD, 220 mg at 02/23/23 2364    REVIEW OF SYSTEMS:   Review of Systems   Constitutional: Negative for chills and fever  HENT: Negative for ear pain and sore throat  Eyes: Negative for pain and visual disturbance  Respiratory: Negative for cough and shortness of breath  Cardiovascular: Negative for chest pain and palpitations  Gastrointestinal: Negative for abdominal pain and vomiting  Genitourinary: Negative for dysuria and hematuria  Musculoskeletal: Negative for arthralgias and back pain  Skin: Negative for color change and rash  Neurological: Negative for seizures and syncope  All other systems reviewed and are negative  All the systems were reviewed and were negative except as documented on the HPI      PHYSICAL EXAM:  Current Weight: Weight - Scale: 67 1 kg (148 lb)  First Weight: Weight - Scale: 67 1 kg (148 lb)  Vitals:    02/22/23 1642 02/22/23 1940 02/22/23 2330 02/23/23 0740   BP: 129/63 127/81 128/77 118/85   BP Location: Left arm Left arm Left arm Right arm   Pulse: 89 65 77 86   Resp: 19 16 16 17   Temp: 97 5 °F (36 4 °C) 97 6 °F (36 4 °C) 97 8 °F (36 6 °C) (!) 97 3 °F (36 3 °C)   TempSrc: Oral Oral Oral Temporal   SpO2: 98% 97% 98% 95%   Weight:       Height:           Intake/Output Summary (Last 24 hours) at 2/23/2023 1006  Last data filed at 2/22/2023 2213  Gross per 24 hour   Intake 500 ml   Output 500 ml   Net 0 ml     Physical Exam  Constitutional:       General: He is not in acute distress  Appearance: He is well-developed  He is not diaphoretic  HENT:      Head: Normocephalic and atraumatic  Mouth/Throat:      Mouth: Mucous membranes are moist    Eyes:      General: No scleral icterus  Conjunctiva/sclera: Conjunctivae normal       Pupils: Pupils are equal, round, and reactive to light  Neck:      Thyroid: No thyromegaly  Cardiovascular:      Rate and Rhythm: Normal rate and regular rhythm  Heart sounds: Normal heart sounds  No murmur heard  No friction rub  Pulmonary:      Effort: Pulmonary effort is normal  No respiratory distress  Breath sounds: Normal breath sounds  No wheezing or rales  Abdominal:      General: Bowel sounds are normal  There is no distension  Palpations: Abdomen is soft  Tenderness: There is no abdominal tenderness  Musculoskeletal:         General: No deformity  Cervical back: Neck supple  Right lower leg: No edema  Left lower leg: No edema  Lymphadenopathy:      Cervical: No cervical adenopathy  Skin:     Coloration: Skin is not pale  Nails: There is no clubbing  Neurological:      Mental Status: He is alert and oriented to person, place, and time  He is not disoriented  Psychiatric:         Mood and Affect: Mood normal  Mood is not anxious  Affect is not inappropriate  Behavior: Behavior normal          Thought Content:  Thought content normal            Invasive Devices:        Lab Results:   Results from last 7 days   Lab Units 02/23/23  0502 02/22/23  1351 02/21/23  0000   WBC Thousand/uL 7 85 7 47 6 60   HEMOGLOBIN g/dL 13 9 13 7 13 2   HEMATOCRIT % 41 3 40 8 39 0   PLATELETS Thousands/uL 139* 111* 134*   POTASSIUM mmol/L 3 1* 3 5 3 5   CHLORIDE mmol/L 89* 82* 77   CO2 mmol/L 35* 37* 33   BUN mg/dL 112* 125* 116   CREATININE mg/dL 3 28* 3 62* 4 05   CALCIUM mg/dL 8 7 8 7 9 0   MAGNESIUM mg/dL 2 5 2 5 2 7*   PHOSPHORUS mg/dL 3 0 3 6 4 1   ALK PHOS U/L  --  149* 199   ALT U/L  --  17 19   AST U/L  --  16 19       Other Studies: Chest x-ray with mild right basilar airspace disease suspicious for pneumonia  Small right pleural effusion      Portions of the record may have been created with voice recognition software  Occasional wrong word or "sound a like" substitutions may have occurred due to the inherent limitations of voice recognition software  Read the chart carefully and recognize, using context, where substitutions have occurred  If you have any questions, please contact the dictating provider

## 2023-02-24 PROBLEM — D75.89 MACROCYTOSIS: Status: ACTIVE | Noted: 2023-02-24

## 2023-02-24 LAB
ANION GAP SERPL CALCULATED.3IONS-SCNC: 7 MMOL/L (ref 4–13)
BUN SERPL-MCNC: 103 MG/DL (ref 5–25)
CALCIUM SERPL-MCNC: 8.2 MG/DL (ref 8.4–10.2)
CHLORIDE SERPL-SCNC: 92 MMOL/L (ref 96–108)
CO2 SERPL-SCNC: 32 MMOL/L (ref 21–32)
CREAT SERPL-MCNC: 3.11 MG/DL (ref 0.6–1.3)
ERYTHROCYTE [DISTWIDTH] IN BLOOD BY AUTOMATED COUNT: 14.7 % (ref 11.6–15.1)
GFR SERPL CREATININE-BSD FRML MDRD: 17 ML/MIN/1.73SQ M
GLUCOSE SERPL-MCNC: 186 MG/DL (ref 65–140)
GLUCOSE SERPL-MCNC: 203 MG/DL (ref 65–140)
GLUCOSE SERPL-MCNC: 273 MG/DL (ref 65–140)
GLUCOSE SERPL-MCNC: 57 MG/DL (ref 65–140)
GLUCOSE SERPL-MCNC: 67 MG/DL (ref 65–140)
GLUCOSE SERPL-MCNC: 76 MG/DL (ref 65–140)
GLUCOSE SERPL-MCNC: 93 MG/DL (ref 65–140)
HCT VFR BLD AUTO: 37.2 % (ref 36.5–49.3)
HGB BLD-MCNC: 12.5 G/DL (ref 12–17)
MCH RBC QN AUTO: 33.4 PG (ref 26.8–34.3)
MCHC RBC AUTO-ENTMCNC: 33.6 G/DL (ref 31.4–37.4)
MCV RBC AUTO: 100 FL (ref 82–98)
OSMOLALITY UR: 441 MMOL/KG
PLATELET # BLD AUTO: 109 THOUSANDS/UL (ref 149–390)
PMV BLD AUTO: 9.5 FL (ref 8.9–12.7)
POTASSIUM SERPL-SCNC: 3.7 MMOL/L (ref 3.5–5.3)
RBC # BLD AUTO: 3.74 MILLION/UL (ref 3.88–5.62)
SODIUM 24H UR-SCNC: 20 MOL/L
SODIUM SERPL-SCNC: 131 MMOL/L (ref 135–147)
WBC # BLD AUTO: 7.45 THOUSAND/UL (ref 4.31–10.16)

## 2023-02-24 RX ADMIN — OXYCODONE HYDROCHLORIDE AND ACETAMINOPHEN 500 MG: 500 TABLET ORAL at 08:42

## 2023-02-24 RX ADMIN — ATORVASTATIN CALCIUM 40 MG: 40 TABLET, FILM COATED ORAL at 16:51

## 2023-02-24 RX ADMIN — CHOLECALCIFEROL TAB 25 MCG (1000 UNIT) 2000 UNITS: 25 TAB at 08:42

## 2023-02-24 RX ADMIN — INSULIN LISPRO 1 UNITS: 100 INJECTION, SOLUTION INTRAVENOUS; SUBCUTANEOUS at 11:37

## 2023-02-24 RX ADMIN — TIMOLOL MALEATE 1 DROP: 5 SOLUTION OPHTHALMIC at 08:43

## 2023-02-24 RX ADMIN — INSULIN LISPRO 1 UNITS: 100 INJECTION, SOLUTION INTRAVENOUS; SUBCUTANEOUS at 21:30

## 2023-02-24 RX ADMIN — ALLOPURINOL 100 MG: 100 TABLET ORAL at 08:42

## 2023-02-24 RX ADMIN — INSULIN LISPRO 2 UNITS: 100 INJECTION, SOLUTION INTRAVENOUS; SUBCUTANEOUS at 16:52

## 2023-02-24 RX ADMIN — ALPRAZOLAM 0.25 MG: 0.25 TABLET ORAL at 21:30

## 2023-02-24 RX ADMIN — ISOSORBIDE MONONITRATE 30 MG: 30 TABLET, EXTENDED RELEASE ORAL at 08:42

## 2023-02-24 RX ADMIN — ALLOPURINOL 100 MG: 100 TABLET ORAL at 17:00

## 2023-02-24 RX ADMIN — CARVEDILOL 6.25 MG: 6.25 TABLET, FILM COATED ORAL at 08:42

## 2023-02-24 RX ADMIN — LATANOPROST 1 DROP: 50 SOLUTION OPHTHALMIC at 21:30

## 2023-02-24 RX ADMIN — ZINC SULFATE 220 MG (50 MG) CAPSULE 220 MG: CAPSULE at 08:42

## 2023-02-24 RX ADMIN — CARVEDILOL 6.25 MG: 6.25 TABLET, FILM COATED ORAL at 16:51

## 2023-02-24 NOTE — PLAN OF CARE
Problem: Potential for Falls  Goal: Patient will remain free of falls  Description: INTERVENTIONS:  - Educate patient/family on patient safety including physical limitations  - Instruct patient to call for assistance with activity   - Consult OT/PT to assist with strengthening/mobility   - Keep Call bell within reach  - Keep bed low and locked with side rails adjusted as appropriate  - Keep care items and personal belongings within reach  - Initiate and maintain comfort rounds  - Make Fall Risk Sign visible to staff  - Offer Toileting every 2 Hours, in advance of need  - Initiate/Maintain bed alarm  - Apply yellow socks and bracelet for high fall risk patients  - Consider moving patient to room near nurses station  Outcome: Progressing     Problem: MOBILITY - ADULT  Goal: Maintain or return to baseline ADL function  Description: INTERVENTIONS:  -  Assess patient's ability to carry out ADLs; assess patient's baseline for ADL function and identify physical deficits which impact ability to perform ADLs (bathing, care of mouth/teeth, toileting, grooming, dressing, etc )  - Assess/evaluate cause of self-care deficits   - Assess range of motion  - Assess patient's mobility; develop plan if impaired  - Assess patient's need for assistive devices and provide as appropriate  - Encourage maximum independence but intervene and supervise when necessary  - Involve family in performance of ADLs  - Assess for home care needs following discharge   - Consider OT consult to assist with ADL evaluation and planning for discharge  - Provide patient education as appropriate  Outcome: Progressing  Goal: Maintains/Returns to pre admission functional level  Description: INTERVENTIONS:  - Perform BMAT or MOVE assessment daily    - Set and communicate daily mobility goal to care team and patient/family/caregiver  - Collaborate with rehabilitation services on mobility goals if consulted  - Perform Range of Motion 2 times a day    - Reposition patient every 2 hours    - Dangle patient 2 times a day  - Stand patient 2 times a day  - Ambulate patient 2 times a day  - Out of bed to chair 2 times a day   - Out of bed for toileting  - Record patient progress and toleration of activity level   Outcome: Progressing

## 2023-02-24 NOTE — PROGRESS NOTES
[facility] Progress Note  Patient: Helen Nunn 78 y o  male   MRN: [id number]  PCP: Keri Waters DO  Unit/Bed#: [address] [place] [address] Encounter: [id number]  Date Of Visit: 02/24/23    Problem List:    Principal Problem:    Acute kidney injury superimposed on chronic kidney disease ([facility] )  Active Problems:    Urinary retention    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 ([place] [address] )    Chronic combined systolic and diastolic congestive heart failure (HCC)    Atrial fibrillation ([place] [address] )    Stage 4 chronic kidney disease ([place] [address] )    Hyponatremia    Essential hypertension    Macrocytosis      Assessment & Plan:    * Acute kidney injury superimposed on chronic kidney disease ([place] [address] )  Assessment & Plan  Referred to ED with worsening of renal function on outpatient blood test, BUN/creatinine- 116/4 0  Repeat blood test in ED, BUNs/creatinine 125/3 6  UA- trace blood, 1-2 RBC  Suspect prerenal azotemia in setting of poor p o  intake, diuretic use  Also postrenal secondary to urinary retention  Bladder scan more than 300 cc status post straight cath with with 700 cc of urine  Dupont catheter was placed  Ultrasound of the kidney was done which revealed atrophic echogenic kidney consistent with medical renal disease, no hydronephrosis  Asymmetric urinary bladder wall thickening noted involving anterior superior right aspect of the bladder    Irregular shape of the urinary bladder noted, physiologic versus Wide neck right-sided bladder diverticulum  Nephrology following, input appreciated  Creatinine is downtrending to 3 1  · Continue to hold diuretics  · Hold torsemide  · Monitor blood pressure  · Monitor off IV fluids  · Continue Dupont  · Monitor intake output  · Avoid nephrotoxins/hypotension  · Nephrology follow-up    Urinary retention  Assessment & Plan  Noted to urinary retention during hospitalization  Likely contributed to acute kidney injury  Seen by urology, recommended Dupont catheter placement  Ultrasound of the kidney/bladder as above  Mild hematuria post Dupont traumatic versus secondary decompression  · Holding anticoagulation  · Monitor urine output  · Continue Dupont  · Follow-up with urology, patient will likely require cystoscopy +/- urodynamics    Hyponatremia  Assessment & Plan  Sodium 129 on admission  Initially improved with IV fluid, now downtrending again  Fluid restriction  Follow-up hyponatremia work-up    Stage 4 chronic kidney disease ([facility] )  Assessment & Plan  History of CKD stage IV, baseline creatinine 2  5-3  Suspected to be secondary to diabetic nephropathy, hypertensive nephrosclerosis and renovascular disease  Follows with nephrology        Atrial fibrillation [facility])  Assessment & Plan  History of chronic A-fib, controlled, continue carvedilol, Eliquis    Chronic combined systolic and diastolic congestive heart failure (HCC)  Assessment & Plan  Wt Readings from Last 3 Encounters:   02/22/23 67 1 kg (148 lb)   02/09/23 67 1 kg (148 lb)   11/07/22 70 8 kg (156 lb)     Echocardiogram-EF 19-48%, grade 2 diastolic dysfunction, moderate concentric hypertrophy  History of pericardial effusion  Currently appears close to euvolemia  · Hold torsemide  · Monitor off IV fluids  · Monitor intake output  · Daily weight      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 ([place] [address] )  Assessment & Plan  Lab Results   Component Value Date    HGBA1C 8 3 (H) 02/23/2023       Recent Labs     02/24/23  1117 02/24/23  1606 02/24/23 2052 02/25/23  0126   POCGLU 203* 273* 186* 172*       Blood Sugar Average: Last 72 hrs:  (P) 194     Remains with asymptomatic morning hypoglycemia  Hold glimepiride, Januvia  Insulin sliding scale  Monitor p o  intake    Macrocytosis  Assessment & Plan  Hemoglobin stable  Check B12  TSH normal    Essential hypertension  Assessment & Plan  Stable  Monitor on carvedilol    hypo kalemia-repleted, improved      VTE Pharmacologic Prophylaxis: VTE Score: 3 Moderate Risk (Score 3-4) - Pharmacological DVT Prophylaxis Ordered: apixaban (Eliquis)  On hold    Patient Centered Rounds: I performed bedside rounds with nursing staff today  Discussions with Specialists or Other Care Team Provider: Nephrology, urology    Education and Discussions with Family / Patient: Attempted to update  (son) via phone  Left voicemail  Time Spent for Care: 55 minutes  More than 50% of total time spent on counseling and coordination of care as described above  Current Length of Stay: 2 day(s)  Current Patient Status: Inpatient   Certification Statement: The patient will continue to require additional inpatient hospital stay due to Acute kidney injury  Discharge Plan: Anticipate discharge in 48-72 hrs to discharge location to be determined pending rehab evaluations  Code Status: Level 1 - Full Code    Subjective:   Does not offer any complaint  Denies any pain  Hypoglycemia earlier today but reports good appetite  Denies any chest pain shortness of breath or abdominal pain    Noted to have urinary retention required Dupont catheter  Developed mild hematuria which appears to be improving    Ultrasound of the kidney was done which revealed atrophic echogenic kidney consistent with medical renal disease, no hydronephrosis  Asymmetric urinary bladder wall thickening noted involving anterior superior right aspect of the bladder  Irregular shape of the urinary bladder noted, physiologic versus Wide neck right-sided bladder diverticulum      Objective:     Vitals:   Temp (24hrs), Av °F (36 7 °C), Min:97 3 °F (36 3 °C), Max:98 8 °F (37 1 °C)    Temp:  [97 3 °F (36 3 °C)-98 8 °F (37 1 °C)] 98 8 °F (37 1 °C)  HR:  [82-88] 85  Resp:  [17-20] 18  BP: (106-126)/(54-85) 120/54  SpO2:  [94 %-98 %] 97 %  Body mass index is 21 86 kg/m²  Input and Output Summary (last 24 hours):      Intake/Output Summary (Last 24 hours) at 2023 5137  Last data filed at 2/24/2023 0556  Gross per 24 hour   Intake 950 ml   Output 1250 ml   Net -300 ml       Physical Exam:   Physical Exam  Constitutional:       General: He is not in acute distress  Comments: Elderly, frail   HENT:      Head: Normocephalic and atraumatic  Cardiovascular:      Rate and Rhythm: Normal rate  Rhythm irregular  Pulmonary:      Effort: Pulmonary effort is normal  No respiratory distress  Breath sounds: No wheezing, rhonchi or rales  Comments: Clear to auscultation,Diminished bilaterally  Chest:      Chest wall: No tenderness  Abdominal:      General: Bowel sounds are normal  There is no distension  Palpations: Abdomen is soft  Tenderness: There is no abdominal tenderness  There is no guarding or rebound  Genitourinary:     Comments: Dupont catheter with pink-tinged urine  Musculoskeletal:      Cervical back: Neck supple  Comments: Bilaterally lower extremity venous stasis   Skin:     General: Skin is warm and dry  Findings: No rash  Neurological:      Mental Status: He is alert  Mental status is at baseline  Cranial Nerves: No cranial nerve deficit  Additional Data:     Labs:  Results from last 7 days   Lab Units 02/24/23  0456 02/23/23  0502 02/22/23  1351   WBC Thousand/uL 7 45   < > 7 47   HEMOGLOBIN g/dL 12 5   < > 13 7   HEMATOCRIT % 37 2   < > 40 8   PLATELETS Thousands/uL 109*   < > 111*   NEUTROS PCT %  --   --  79*   LYMPHS PCT %  --   --  9*   MONOS PCT %  --   --  12   EOS PCT %  --   --  0    < > = values in this interval not displayed       Results from last 7 days   Lab Units 02/24/23  0456 02/23/23  0502 02/22/23  1351   SODIUM mmol/L 131*   < > 129*   POTASSIUM mmol/L 3 7   < > 3 5   CHLORIDE mmol/L 92*   < > 82*   CO2 mmol/L 32   < > 37*   BUN mg/dL 103*   < > 125*   CREATININE mg/dL 3 11*   < > 3 62*   ANION GAP mmol/L 7   < > 10   CALCIUM mg/dL 8 2*   < > 8 7   ALBUMIN g/dL  --   --  3 6   TOTAL BILIRUBIN mg/dL  --   --  1 42* ALK PHOS U/L  --   --  149*   ALT U/L  --   --  17   AST U/L  --   --  16   GLUCOSE RANDOM mg/dL 76   < > 215*    < > = values in this interval not displayed  Results from last 7 days   Lab Units 02/24/23  0222 02/23/23  2049 02/23/23  1618 02/23/23  1132 02/23/23  0714 02/23/23  0306 02/22/23  2045   POC GLUCOSE mg/dl 93 176* 260* 273* 64* 133 359*     Results from last 7 days   Lab Units 02/23/23  0502   HEMOGLOBIN A1C % 8 3*           Lines/Drains:  Invasive Devices     Peripheral Intravenous Line  Duration           Peripheral IV 02/22/23 Right Forearm 1 day          Drain  Duration           Urethral Catheter Latex 16 Fr  <1 day                      Imaging: No pertinent imaging reviewed  Recent Cultures (last 7 days):         Last 24 Hours Medication List:   Current Facility-Administered Medications   Medication Dose Route Frequency Provider Last Rate   • acetaminophen  650 mg Oral Q6H PRN Marni Swan MD     • allopurinol  100 mg Oral BID Marni Swan MD     • ALPRAZolam  0 25 mg Oral HS Marni Swan MD     • ascorbic acid  500 mg Oral Daily Marni Swan MD     • atorvastatin  40 mg Oral Daily With Gonzalo Mauro MD     • carvedilol  6 25 mg Oral BID With Meals Marni Swan MD     • cholecalciferol  2,000 Units Oral Daily Marni Swan MD     • insulin lispro  1-5 Units Subcutaneous TID Abbi Barrow MD     • insulin lispro  1-5 Units Subcutaneous HS Manri Swan MD     • isosorbide mononitrate  30 mg Oral Daily Marni Swan MD     • latanoprost  1 drop Both Eyes HS Marni Swan MD     • sodium chloride  50 mL/hr Intravenous Continuous Marni Swan MD 50 mL/hr (02/23/23 8214)   • timolol  1 drop Both Eyes Daily Marni Swan MD     • zinc sulfate  220 mg Oral Daily Marni Swan MD          Today, Patient Was Seen By: Marni Swan MD    ** Please Note: "This note has been constructed using a voice recognition system  Therefore there may be syntax, spelling, and/or grammatical errors   Please call if you have any questions  "**

## 2023-02-24 NOTE — CASE MANAGEMENT
Case Management Discharge Planning Note    Patient name Mikaela Sharif  Location [address]-* MRN [id number]  : 1940 Date 2023       Current Admission Date: 2023  Current Admission Diagnosis:Acute kidney injury superimposed on chronic kidney disease [facility])   Patient Active Problem List    Diagnosis Date Noted   • Hyponatremia 2023   • Anemia 2023   • Chronic kidney disease-mineral and bone disorder 2023   • Advanced care planning/counseling discussion 2023   • Stage 4 chronic kidney disease ([facility] ) 2022   • Benign hypertension with CKD (chronic kidney disease) stage IV ([facility] ) 2022   • Persistent proteinuria 2022   • COVID-19 2022   • Electrolyte abnormality 2022   • Cellulitis of left upper extremity 2021   • Atrial fibrillation ([facility] ) 2021   • Vitamin D deficiency 10/18/2019   • Acute kidney injury superimposed on chronic kidney disease ([facility] ) 2019   • Chronic combined systolic and diastolic congestive heart failure ([facility] ) 2019   • Pericardial effusion 2019   • Leg edema 01/10/2019   • 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 ([facility] ) 01/10/2019   • PVC (premature ventricular contraction) 2018   • Essential hypertension 2018   • Closed traumatic displaced fracture of distal end of left radius with malunion 2018      LOS (days): 2  Geometric Mean LOS (GMLOS) (days): 3 10  Days to GMLOS:1 5     OBJECTIVE:  Risk of Unplanned Readmission Score: 19 55         Current admission status: Inpatient   Preferred Pharmacy:   [facility] [address] [address]  [address]  [address] [facility],  [address] [address]  Phone: [phone] Fax: [phone]    Primary Care Provider: Amy Padron DO    Primary Insurance: MEDICARE  Secondary Insurance: BLUE CROSS    DISCHARGE DETAILS:    [address]         Is the patient interested in [facility] at discharge?: Yes  [address] requested[de-identified] Nursing, Physical [address] Name[de-identified] Other  [facility] Danette Eliezer Provider[de-identified] PCP  Home Health Services Needed[de-identified] Urinary Incontinence Catheter Management, Evaluate Functional Status and Safety, Gait/ADL Training, Strengthening/Theraputic Exercises to Improve Function, Heart Failure Management, Diabetes Management  Supporting Clincal Findings[de-identified] Limited Endurance, Fatigues Easliy in [employer]     Other Referral/Resources/Interventions Provided:  Referral Comments: CM made aware at rounds this morning that patient will be going home with a victor catheter due to hematuria  CM placed referrals again via [facility] and added SN & PT, awaiting response       Treatment Team Recommendation: Short Term Rehab  Discharge Destination Plan[de-identified] Short Term Rehab, Home with [place] at Discharge : Family

## 2023-02-24 NOTE — CASE MANAGEMENT
Case Management Assessment & Discharge Planning Note    Patient name Odette Washington  Location [address] MRN [id number]  : 1940 Date 2023       Current Admission Date: 2023  Current Admission Diagnosis:Acute kidney injury superimposed on chronic kidney disease [facility])   Patient Active Problem List    Diagnosis Date Noted   • Hyponatremia 2023   • Anemia 2023   • Chronic kidney disease-mineral and bone disorder 2023   • Advanced care planning/counseling discussion 2023   • Stage 4 chronic kidney disease ([facility] ) 2022   • Benign hypertension with CKD (chronic kidney disease) stage IV ([facility] ) 2022   • Persistent proteinuria 2022   • COVID-19 2022   • Electrolyte abnormality 2022   • Cellulitis of left upper extremity 2021   • Atrial fibrillation ([facility] ) 2021   • Vitamin D deficiency 10/18/2019   • Acute kidney injury superimposed on chronic kidney disease ([facility] ) 2019   • Chronic combined systolic and diastolic congestive heart failure ([facility] ) 2019   • Pericardial effusion 2019   • Leg edema 01/10/2019   • 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 ([facility] ) 01/10/2019   • PVC (premature ventricular contraction) 2018   • Essential hypertension 2018   • Closed traumatic displaced fracture of distal end of left radius with malunion 2018      LOS (days): 2  Geometric Mean LOS (GMLOS) (days): 3 10  Days to GMLOS:1 6     OBJECTIVE:    Risk of Unplanned Readmission Score: 19 55         Current admission status: Inpatient     Preferred Pharmacy:   [place] [address] [address] [address]  Phone: [phone] Fax: [phone]    Primary Care Provider: Iliana Zazueta DO    Primary Insurance: MEDICARE  Secondary Insurance: BLUE CROSS    ASSESSMENT:  [address] Proxies    There are no active Health Care Proxies on file  Readmission Root Cause  30 Day Readmission: No    Patient Information  Admitted from[de-identified] Home  Mental Status: Alert  During Assessment patient was accompanied by: Not accompanied during assessment  Assessment information provided by[de-identified] Patient  Primary Caregiver: Family  Caregiver's Name[de-identified] Edna An Relationship to Patient[de-identified] Family Member  Caregiver's Telephone Number[de-identified] [phone]  Support Systems: Family members  [place] of Residence: [address] [place] [address] do you live in?: [address] entry access options   Select all that apply : No steps to enter home  Type of Current Residence: 2 story home  Upon entering residence, is there a bedroom on the main floor (no further steps)?: Yes  Upon entering residence, is there a bathroom on the main floor (no further steps)?: Yes  In the last 12 months, was there a time when you were not able to pay the mortgage or rent on time?: No  In the last 12 months, how many places have you lived?: 1  In the last 12 months, was there a time when you did not have a steady place to sleep or slept in a shelter (including now)?: No  Homeless/housing insecurity resource given?: N/A  Living Arrangements: Lives w/ Son    Activities of Daily Living Prior to Admission  Functional Status: Independent  Completes ADLs independently?: Yes  Ambulates independently?: Yes  Does patient use assisted devices?: No  Does patient currently own DME?: No  Does patient have a history of Outpatient Therapy (PT/OT)?: No  Does the patient have a history of Short-Term Rehab?: No  Does patient have a history of [facility]?: No  [facility]     Patient Information Continued  Income Source: Self-employed  Does patient have prescription coverage?: Yes  Within the past 12 months, you worried that your food would run out before you got the money to buy more : Never true  Within the past 12 months, the food you bought just didn't last and you didn't have money to get more : Never true  Food insecurity resource given?: N/A  Does patient receive dialysis treatments?: No     Means of Transportation  Means of Transport to Appts[de-identified] Family transport  In the past 12 months, has lack of transportation kept you from medical appointments or from getting medications?: No  In the past 12 months, has lack of transportation kept you from meetings, work, or from getting things needed for daily living?: No  Was application for public transport provided?: N/A    DISCHARGE DETAILS:    Discharge planning discussed with[de-identified] Patient  Freedom of Choice: Yes  Comments - Freedom of Choice: Discussed STR per therapy recs   Patient prefers home with therapy, no agency preference, agreeable for CM to place blanket referral  CM contacted family/caregiver?: Yes  Were Treatment Team discharge recommendations reviewed with patient/caregiver?: Yes  Did patient/caregiver verbalize understanding of patient care needs?: Yes  Were patient/caregiver advised of the risks associated with not following Treatment Team discharge recommendations?: Yes    Contacts  Patient Contacts: Helder Perez (son)  Relationship to Patient[de-identified] Family  Contact Method: Phone  Phone Number: [phone]  Reason/Outcome: Emergency Contact, Discharge [address]         Is the patient interested in [facility] at discharge?: Yes  [address] requested[de-identified] Physical Therapy, Occupational [address] Name[de-identified] Other  [address] [place] [address] Provider[de-identified] PCP  Home Health Services Needed[de-identified] Evaluate Functional Status and Safety, Gait/ADL Training, Strengthening/Theraputic Exercises to Improve Function  Homebound Criteria Met[de-identified] Uses an Assist Device (i e  cane, walker, etc)  Supporting Clincal Findings[de-identified] Limited Endurance, Fatigues Easliy in [employer]     Other Referral/Resources/Interventions Provided:  Interventions: [facility]  Referral Comments: CM met with patient at bedside, introduced self, role, purpose of initial assessment and dischare planning  Patient stated that he lives with his son Alyssia Branch and is has been independent with [address] until this hospitalization  He did mention that he drives when he is able to, and if not then his son assists with providing transportation to medical appts  Discussed discharge planning and therapy recs for STR  Patient stated he wants to go straight home  Agreeable for therapy at home  Patient does not have any preference for [facility] agency and agreeable for CM to place blanket referral  Obtained consent to call patient's son Alyssia Branch to discuss dcp  CM called and spoke with son Alyssia Branhc to discuss therapy recs for STR and patient's wish for therapy at home  Alyssia Branch agreeable for CM to place referral for therapy in home  CM placed blanket referral for PT via [address], awaiting response       Treatment Team Recommendation: Short Term Rehab  Discharge Destination Plan[de-identified] Home with [place] at Discharge : Family

## 2023-02-24 NOTE — PHYSICAL THERAPY NOTE
PHYSICAL THERAPY EVALUATION/TREATMENT     02/24/23 1000   PT Last Visit   PT Visit Date 02/24/23   Note Type   Note type Evaluation   Pain Assessment   Pain Assessment Tool 0-10   Pain Score No Pain   Restrictions/Precautions   Weight Bearing Precautions Per Order No   Other Precautions Chair Alarm; Bed Alarm; Fall Risk;Hard of hearing   Home Living   Type of [address] Two level; Able to live on main level with bedroom/bathroom   Prior Function   Level of [place] Independent with ADLs; Independent with functional mobility; Independent with IADLS   Lives With Son  (and sister in law)   [address] Help From Family   Vocational Part time employment   Comments Drives a oil tanker truck   General   Additional Pertinent History Pt is an 78year old male admitted due to renal failure  History of CKD stage 4, DM, CAD, CHF   Family/Caregiver Present Yes  (nephew)   Cognition   Overall Cognitive Status WFL   Arousal/Participation Cooperative   Orientation Level Oriented to person;Oriented to place;Oriented to situation   Following Commands Follows multistep commands with increased time or repetition   Subjective   Subjective pt reports that he does not use an AD or own any   "I haven't needed them"   RLE Assessment   RLE Assessment WFL  (strength:  grossly 4/5)   LLE Assessment   LLE Assessment WFL  (strength:  grossly 4/5)   Bed Mobility   Supine to Sit 4  Minimal assistance   Additional items Assist x 1;Verbal cues; Increased time required   Sit to Supine 5  Supervision   Additional items Assist x 1;Verbal cues   Transfers   Sit to Stand 4  Minimal assistance   Additional items Assist x 1;Verbal cues   Stand to Sit 5  Supervision   Additional items Verbal cues   Ambulation/Elevation   Gait pattern Forward Flexion; Short stride  (slow gurarded carlo)   Gait Assistance 4  Minimal assist   Additional items Assist x 1   Assistive Device Rolling walker   Distance 3 feet   Stair Management Assistance Not tested   Ambulation/Elevation Additional Comments Initially pt stood and attempted to ambulate without AD; unsteady , so used a RW   Balance   Static Sitting Good   Dynamic Sitting Fair   Static Standing Fair   Dynamic Standing Fair -   Ambulatory   (without RW P+/ with RW: fair -)   Activity Tolerance   Activity Tolerance Patient limited by fatigue   Nurse Made Aware yes: Mariluz Thomson   Prognosis Good   Problem List Decreased strength;Decreased endurance; Impaired balance;Decreased mobility; Decreased safety awareness; Impaired hearing   Assessment Patient seen for Physical Therapy evaluation  Patient admitted with Acute kidney injury superimposed on chronic kidney disease ([facility] )  Comorbidities affecting patient's physical performance include: DM, CKDstage 4, HTN, CHF, CAD  [place]  Personal factors affecting patient at time of initial evaluation include: lives in two story house, inability to ambulate household distances, inability to navigate community distances, inability to navigate level surfaces without external assistance, hearing impairments, inability to perform current job functions, inability to perform ADLS and inability to perform IADLS   Prior to admission, patient was independent with functional mobility without assistive device, independent with ADLS, independent with IADLS, living with son and sister in law in a two level home with 0 steps to enter, ambulating household distance, ambulating community distances, works part time and lives in a multilevel house but has 1st floor setup  Please find objective findings from Physical Therapy assessment regarding body systems outlined above with impairments and limitations including weakness, impaired balance, decreased endurance, gait deviations, decreased functional mobility tolerance, decreased safety awareness and fall risk    The Barthel Index was used as a functional outcome tool presenting with a score of Barthel Index Score: 40 today indicating marked limitations of functional mobility and ADLS  Patient's clinical presentation is currently unstable/unpredictable as seen in patient's presentation of varying levels of cognitive performance, increased fall risk, new onset of impairment of functional mobility and decreased endurance  Pt would benefit from continued Physical Therapy treatment to address deficits as defined above and maximize level of functional mobility  As demonstrated by objective findings, the assigned level of complexity for this evaluation is high  The patient's AM-PAC Basic Mobility Inpatient Short Form Raw Score is 17  A Raw score of greater than 16 suggests the patient may benefit from discharge to home, however pt is not at his baseline and is walking with a walker today  Would recommend STR at this time,pending pt progress    Please also refer to the recommendation of the Physical Therapist for safe discharge planning  Goals   Patient Goals to go home   STG Expiration Date 03/03/23   Short Term Goal #1 Indep transfers supine <> short sit and sit <> stand with AD with fair + balance   Short Term Goal #2 Supervision for amb  with AD for functional household distances with fair + balance   LTG Expiration Date 03/10/23   Long Term Goal #1 Indep amb  without AD for community distances with good balance  Long Term Goal #2 Indep navigating stairs with one railing  Plan   Treatment/Interventions ADL retraining;Functional transfer training;LE strengthening/ROM; Elevations; Therapeutic exercise; Endurance training;Patient/family training;Equipment eval/education; Bed mobility;Gait training;Spoke to nursing;Family   PT Frequency Other (Comment)  (5/wk)   Recommendation   PT Discharge Recommendation Post acute rehabilitation services   Additional Comments Pt may decline rehab  If pt is to be d/c to home would recommend home PT , pending pt progress during hospital course    If pt returns to baseline of function, could also recommend OP PT at balance center  AM-PAC Basic Mobility Inpatient   Turning in Flat Bed Without Bedrails 3   Lying on Back to Sitting on Edge of Flat Bed Without Bedrails 3   Moving Bed to Chair 3   Standing Up From Chair Using Arms 3   Walk in Room 3   Climb 3-5 Stairs With Railing 2   Basic Mobility Inpatient Raw Score 17   Basic Mobility Standardized Score 39 67   Highest Level Of Mobility   -[facility] Goal 5: Stand one or more mins   -HL Achieved 7: Walk 25 feet or more   Barthel Index   Feeding 10   Bathing 0   Grooming Score 0   Dressing Score 5   Bladder Score 0   Bowels Score 5   Toilet Use Score 5   Transfers (Bed/Chair) Score 10   Mobility (Level Surface) Score 0   Stairs Score 5   Barthel Index Score 40   Additional Treatment Session   Start Time 0950   End Time 1000   Treatment Assessment Pt stood at side of bed and took a few steps with Min/ Mod A from PT  Pt returned to sitting and then used a RW   Pt was able to navigate 25 feet around room with Min A to turn walker and maintain balance during turn  Pt returned to bed and positioned with alll needs in reach  A: Tolerated fairly well  Pt is not at his baseline of function due to decreased balance and need for use of RW for stability  Apparently , per nephew, 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

## 2023-02-24 NOTE — PLAN OF CARE
Problem: Potential for Falls  Goal: Patient will remain free of falls  Description: INTERVENTIONS:  - Educate patient/family on patient safety including physical limitations  - Instruct patient to call for assistance with activity   - Consult OT/PT to assist with strengthening/mobility   - Keep Call bell within reach  - Keep bed low and locked with side rails adjusted as appropriate  - Keep care items and personal belongings within reach  - Initiate and maintain comfort rounds  - Make Fall Risk Sign visible to staff  - Offer Toileting every 2 Hours, in advance of need  - Initiate/Maintain bed alarm  - Obtain necessary fall risk management equipment: Call bell  - Apply yellow socks and bracelet for high fall risk patients  - Consider moving patient to room near nurses station  Outcome: Progressing     Problem: MOBILITY - ADULT  Goal: Maintain or return to baseline ADL function  Description: INTERVENTIONS:  -  Assess patient's ability to carry out ADLs; assess patient's baseline for ADL function and identify physical deficits which impact ability to perform ADLs (bathing, care of mouth/teeth, toileting, grooming, dressing, etc )  - Assess/evaluate cause of self-care deficits   - Assess range of motion  - Assess patient's mobility; develop plan if impaired  - Assess patient's need for assistive devices and provide as appropriate  - Encourage maximum independence but intervene and supervise when necessary  - Involve family in performance of ADLs  - Assess for home care needs following discharge   - Consider OT consult to assist with ADL evaluation and planning for discharge  - Provide patient education as appropriate  Outcome: Progressing  Goal: Maintains/Returns to pre admission functional level  Description: INTERVENTIONS:  - Perform BMAT or MOVE assessment daily    - Set and communicate daily mobility goal to care team and patient/family/caregiver     - Collaborate with rehabilitation services on mobility goals if consulted  - Perform Range of Motion 2 times a day  - Reposition patient every 2 hours    - Dangle patient 2 times a day  - Stand patient 2 times a day  - Ambulate patient 2 times a day  - Out of bed to chair 2 times a day   - Out of bed for meals 2 times a day  - Out of bed for toileting  - Record patient progress and toleration of activity level   Outcome: Progressing     Problem: Prexisting or High Potential for Compromised Skin Integrity  Goal: Skin integrity is maintained or improved  Description: INTERVENTIONS:  - Identify patients at risk for skin breakdown  - Assess and monitor skin integrity  - Assess and monitor nutrition and hydration status  - Monitor labs   - Assess for incontinence   - Turn and reposition patient  - Assist with mobility/ambulation  - Relieve pressure over bony prominences  - Avoid friction and shearing  - Provide appropriate hygiene as needed including keeping skin clean and dry  - Evaluate need for skin moisturizer/barrier cream  - Collaborate with interdisciplinary team   - Patient/family teaching  - Consider wound care consult   Outcome: Progressing

## 2023-02-24 NOTE — PROGRESS NOTES
NEPHROLOGY PROGRESS NOTE   Harry Webster 78 y o  male MRN: [id number]  Unit/Bed#: [address] [place] [address] Encounter: [id number]    ASSESSMENT & PLAN:  Acute kidney injury, POA  -Baseline creatinine: 2 5-3 0 mg/dl  -Admission creatinine: 3 62 mg/dl  Outpatient labs from 2/21/2023 showed creatinine 4 05 with sodium of 126  - Work up:   • UA with microscopy: UA with trace blood  1-2 RBC per high-power field  • Imaging:  Kidney ultrasound with no hydronephrosis  Diffusely echogenic kidney, finding of bladder wall thickening   -Etiology: Acute kidney injury likely due to prerenal azotemia/volume depletion in the setting of poor oral intake and diuretic use and also due to urinary retention  -Hospital Course: Renal function already improving with IV fluids  Bladder scan was negative  -Plan:   • Renal function improving to creatinine 3 11 mg/dL  Potassium at normal range at 3 7  Discussed with primary team about renal function improving, most likely acute kidney injury due to urinary retention as patient had Dupont catheter placement on 2/23 and put out 700 mL of urine immediately  Currently with hematuria and urology following, discussed with primary team about stopping IV fluids and agreed with the plan  • Avoid nephrotoxins and dose all medications per EGFR  • Avoid hypotension                                              Chronic Kidney Disease Stage 4  -Outpatient Nephrologist: Dr Radha Lin  -Baseline Creatinine: 2 5-3 0  -Etiology: Suspected secondary to diabetic nephropathy, hypertensive nephrosclerosis, arteriolar nephrosclerosis and renovascular disease  -Avoid Nephrotoxins and Dose all medications per eGFR  -Will need outpatient follow up after discharge      Chronic systolic CHF with ejection fraction 40%  -Clinically appears euvolemic, likely had volume depletion prior to admission    As per outpatient note from February, weight was at high 140s and was on torsemide 40 mg daily with metolazone as needed  -Hold diuretics today, stopping IV fluids and possibly restart diuretics in next 24 to 48 hours     Hypokalemia: Resolved     Hyponatremia: Admission sodium was 129 on 2/22  -Sodium level improved and now again trended down today to 131  Continue to monitor  -Hyponatremia likely from intravascular volume depletion plus poor oral intake   -Ordered for work-up  Placed on fluid restriction 1 5 L/day     Elevated bicarb  -Bicarb level elevated at 32 likely due to intravascular volume depletion  Overall bicarb level improving with IV hydration     BP primary hypertension  -Home Medication: Include Coreg, Imdur, torsemide  -Currently BP acceptable, continue current treatment with Coreg Imdur but hold torsemide in the setting of acute kidney injury    Urinary retention, status post Dupont catheter placement currently with hematuria, urology following     Hypermagnesemia on outpatient labs, magnesium level now at normal range at 2 5, continue to monitor     Others: A-fib/diabetes mellitus type 2-management per primary team         SUBJECTIVE:  No new complaints  No chest pain or shortness of breath  Patient had urine retention, seen by urology and Dupont catheter was placed on 2/23, currently with hematuria    OBJECTIVE:  Current Weight: Weight - Scale: 67 1 kg (148 lb)  Vitals:    02/24/23 0744   BP: 140/63   Pulse: 84   Resp: 18   Temp: 97 5 °F (36 4 °C)   SpO2: 95%       Intake/Output Summary (Last 24 hours) at 2/24/2023 1112  Last data filed at 2/24/2023 0850  Gross per 24 hour   Intake 1200 ml   Output 1100 ml   Net 100 ml       Physical Exam  General:  Ill looking, awake  Eyes: Conjunctivae pink,  Sclera anicteric  ENT: lips and mucous membranes moist  Neck: supple   Chest: Clear to Auscultation both lungs,  no crackles, ronchus or wheezing  CVS: S1 & S2 present, normal rate, regular rhythm, no murmur    Abdomen: soft, non-tender, non-distended, Bowel sounds normoactive  Extremities: no edema of  legs  Skin: no rash  Neuro: awake, alert, oriented x3  Psych: Mood and affect appropriate   Dupont catheter with hematuria    Medications:    Current Facility-Administered Medications:   •  acetaminophen (TYLENOL) tablet 650 mg, 650 mg, Oral, Q6H PRN, Sukhi Beltran MD  •  allopurinol (ZYLOPRIM) tablet 100 mg, 100 mg, Oral, BID, Sukhi Beltran MD, 100 mg at 02/24/23 6915  •  ALPRAZolam (XANAX) tablet 0 25 mg, 0 25 mg, Oral, HS, Sukhi Beltran MD, 0 25 mg at 02/23/23 2139  •  ascorbic acid (VITAMIN C) tablet 500 mg, 500 mg, Oral, Daily, Sukhi Beltran MD, 500 mg at 02/24/23 1782  •  atorvastatin (LIPITOR) tablet 40 mg, 40 mg, Oral, Daily With Dinner, Sukhi Beltran MD, 40 mg at 02/23/23 1617  •  carvedilol (COREG) tablet 6 25 mg, 6 25 mg, Oral, BID With Meals, Sukhi Beltran MD, 6 25 mg at 02/24/23 3471  •  cholecalciferol (VITAMIN D3) tablet 2,000 Units, 2,000 Units, Oral, Daily, Sukhi Beltran MD, 2,000 Units at 02/24/23 0842  •  insulin lispro (HumaLOG) 100 units/mL subcutaneous injection 1-5 Units, 1-5 Units, Subcutaneous, TID AC, 2 Units at 02/23/23 1620 **AND** Fingerstick Glucose (POCT), , , TID AC, Sukhi Beltran MD  •  insulin lispro (HumaLOG) 100 units/mL subcutaneous injection 1-5 Units, 1-5 Units, Subcutaneous, HS, Sukhi Beltran MD, 1 Units at 02/23/23 2139  •  isosorbide mononitrate (IMDUR) 24 hr tablet 30 mg, 30 mg, Oral, Daily, Sukhi Beltran MD, 30 mg at 02/24/23 0842  •  latanoprost (XALATAN) 0 005 % ophthalmic solution 1 drop, 1 drop, Both Eyes, HS, Sukhi Beltran MD, 1 drop at 02/23/23 2139  •  sodium chloride 0 9 % infusion, 50 mL/hr, Intravenous, Continuous, Sukhi Beltran MD, Last Rate: 50 mL/hr at 02/23/23 2335, 50 mL/hr at 02/23/23 2335  •  timolol (TIMOPTIC) 0 5 % ophthalmic solution 1 drop, 1 drop, Both Eyes, Daily, Sukhi Beltran MD, 1 drop at 02/24/23 0843  •  zinc sulfate (ZINCATE) capsule 220 mg, 220 mg, Oral, Daily, Sukhi Beltran MD, 220 mg at 02/24/23 0842    Invasive Devices:   Urethral Catheter Latex 16 Fr  (Active)   Reasons to continue Urinary Catheter  Acute urinary retention/obstruction failing urinary retention protocol 02/24/23 0850   Goal for Removal Will consult urology 02/24/23 0850   Site Assessment Clean;Skin intact 02/24/23 0850   Dupont Care Done 02/24/23 0850   Collection Container Standard drainage bag 02/24/23 0850   Securement Method Securing device (Describe) 02/24/23 0850   Output (mL) 500 mL 02/24/23 0556       Lab Results:   Results from last 7 days   Lab Units 02/24/23  0456 02/23/23  0502 02/22/23  1351 02/21/23  0000   WBC Thousand/uL 7 45 7 85 7 47 6 60   HEMOGLOBIN g/dL 12 5 13 9 13 7 13 2   HEMATOCRIT % 37 2 41 3 40 8 39 0   PLATELETS Thousands/uL 109* 139* 111* 134*   POTASSIUM mmol/L 3 7 3 1* 3 5 3 5   CHLORIDE mmol/L 92* 89* 82* 77   CO2 mmol/L 32 35* 37* 33   BUN mg/dL 103* 112* 125* 116   CREATININE mg/dL 3 11* 3 28* 3 62* 4 05   CALCIUM mg/dL 8 2* 8 7 8 7 9 0   MAGNESIUM mg/dL  --  2 5 2 5 2 7*   PHOSPHORUS mg/dL  --  3 0 3 6 4 1   ALK PHOS U/L  --   --  149* 199   ALT U/L  --   --  17 19   AST U/L  --   --  16 19       Previous work up:         Portions of the record may have been created with voice recognition software  Occasional wrong word or "sound a like" substitutions may have occurred due to the inherent limitations of voice recognition software  Read the chart carefully and recognize, using context, where substitutions have occurred  If you have any questions, please contact the dictating provider

## 2023-02-24 NOTE — PROGRESS NOTES
Progress Note - Urology      Patient: Arnold Arce   : 1940 Sex: male   MRN: [id number]     CSN: [id number]  Unit/Bed#: [address] [place] [address]     SUBJECTIVE:   Patient seen on morning rounds  Dupont catheter inserted last night now with gross hematuria  Renal ultrasound confirming no hydronephrosis  Creatinine trending down      Objective   Vitals: /55 (BP Location: Left arm)   Pulse 84   Temp 98 4 °F (36 9 °C) (Temporal)   Resp 16   Ht 5' 9" (1 753 m)   Wt 67 1 kg (148 lb)   SpO2 97%   BMI 21 86 kg/m²     I/O last 24 hours:   In: 1410 [P O :460; I V :950]  Out: 1850 [Urine:1850]      Physical Exam:   General Alert awake   Normocephalic atraumatic PERRLA  Lungs clear bilaterally  Cardiac normal S1 normal S2  Abdomen soft, flank pain  Dupont draining mildly hematuric urine  Extremities no edema      Lab Results: CBC:   Lab Results   Component Value Date    WBC 7 45 2023    HGB 12 5 2023    HCT 37 2 2023     (H) 2023     (L) 2023    MCH 33 4 2023    MCHC 33 6 2023    RDW 14 7 2023    MPV 9 5 2023    NRBC 0 2023     CMP:   Lab Results   Component Value Date    CL 92 (L) 2023    CO2 32 2023     (H) 2023    CREATININE 3 11 (H) 2023    CALCIUM 8 2 (L) 2023    AST 16 2023    ALT 17 2023    ALKPHOS 149 (H) 2023    EGFR 17 2023     Urinalysis:   Lab Results   Component Value Date    COLORU Light Yellow 2023    CLARITYU Clear 2023    SPECGRAV <=1 005 2023    PHUR 6 5 2023    PHUR 5 0 2019    PHUR 5 5 01/10/2019    LEUKOCYTESUR Small (A) 2023    NITRITE Negative 2023    GLUCOSEU 500 (1/2%) (A) 2023    KETONESU Negative 2023    BILIRUBINUR Negative 2023    BLOODU Trace-Intact (A) 2023     Urine Culture: No results found for: URINECX  PSA: No results found for: PSA      Assessment/ Plan:  Urinary retention  Post Dupont insertion gross hematuria possible traumatic and/or post bladder distention bladder oozing  Continue Victor cath  Patient will need to be sent home with Victor  We will see in office for flex cystoscopy to evaluate hematuria in light of previous smoking history  Long discussion son, will bring to office with victor for flex cysto in 1 week        Pippa Lopez MD

## 2023-02-24 NOTE — CASE MANAGEMENT
Case Management Discharge Planning Note    Patient name Helen Nunn  Location [address] [place] [address]-* MRN [id number]  : 1940 Date 2023       Current Admission Date: 2023  Current Admission Diagnosis:Acute kidney injury superimposed on chronic kidney disease [facility])   Patient Active Problem List    Diagnosis Date Noted   • Hyponatremia 2023   • Anemia 2023   • Chronic kidney disease-mineral and bone disorder 2023   • Advanced care planning/counseling discussion 2023   • Stage 4 chronic kidney disease ([facility] [address] ) 2022   • Benign hypertension with CKD (chronic kidney disease) stage IV ([facility] [address] ) 2022   • Persistent proteinuria 2022   • COVID-19 2022   • Electrolyte abnormality 2022   • Cellulitis of left upper extremity 2021   • Atrial fibrillation ([facility] [address] ) 2021   • Vitamin D deficiency 10/18/2019   • Acute kidney injury superimposed on chronic kidney disease ([facility] [address] ) 2019   • Chronic combined systolic and diastolic congestive heart failure ([facility] [address] ) 2019   • Pericardial effusion 2019   • Leg edema 01/10/2019   • 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 ([facility] [address] ) 01/10/2019   • PVC (premature ventricular contraction) 2018   • Essential hypertension 2018   • Closed traumatic displaced fracture of distal end of left radius with malunion 2018      LOS (days): 2  Geometric Mean LOS (GMLOS) (days): 3 10  Days to GMLOS:1 3     OBJECTIVE:  Risk of Unplanned Readmission Score: 19 44         Current admission status: Inpatient   Preferred Pharmacy:   [facility] [address] [address] - [address]  [address] [facility]  [address] [facility],  [address] [address]  Phone: [phone] Fax: [phone]    Primary Care Provider: Keri Waters DO    Primary Insurance: MEDICARE  Secondary Insurance: [address] Name[de-identified] [facility]     Other Referral/Resources/Interventions Provided:  Referral Comments: CM reserved [facility] via [employer]  Patient made aware       Treatment Team Recommendation: Short Term Rehab  Discharge Destination Plan[de-identified] Home with [place] at Discharge : Family

## 2023-02-25 PROBLEM — R33.9 URINARY RETENTION: Status: ACTIVE | Noted: 2023-02-25

## 2023-02-25 LAB
ANION GAP SERPL CALCULATED.3IONS-SCNC: 4 MMOL/L (ref 4–13)
ANION GAP SERPL CALCULATED.3IONS-SCNC: 7 MMOL/L (ref 4–13)
BUN SERPL-MCNC: 88 MG/DL (ref 5–25)
BUN SERPL-MCNC: 93 MG/DL (ref 5–25)
CALCIUM SERPL-MCNC: 7.8 MG/DL (ref 8.4–10.2)
CALCIUM SERPL-MCNC: 8.1 MG/DL (ref 8.4–10.2)
CHLORIDE SERPL-SCNC: 92 MMOL/L (ref 96–108)
CHLORIDE SERPL-SCNC: 93 MMOL/L (ref 96–108)
CO2 SERPL-SCNC: 30 MMOL/L (ref 21–32)
CO2 SERPL-SCNC: 32 MMOL/L (ref 21–32)
CORTIS AM PEAK SERPL-MCNC: 6.8 UG/DL (ref 4.2–22.4)
CREAT SERPL-MCNC: 2.81 MG/DL (ref 0.6–1.3)
CREAT SERPL-MCNC: 2.89 MG/DL (ref 0.6–1.3)
ERYTHROCYTE [DISTWIDTH] IN BLOOD BY AUTOMATED COUNT: 14.8 % (ref 11.6–15.1)
GFR SERPL CREATININE-BSD FRML MDRD: 19 ML/MIN/1.73SQ M
GFR SERPL CREATININE-BSD FRML MDRD: 19 ML/MIN/1.73SQ M
GLUCOSE SERPL-MCNC: 102 MG/DL (ref 65–140)
GLUCOSE SERPL-MCNC: 172 MG/DL (ref 65–140)
GLUCOSE SERPL-MCNC: 192 MG/DL (ref 65–140)
GLUCOSE SERPL-MCNC: 229 MG/DL (ref 65–140)
GLUCOSE SERPL-MCNC: 307 MG/DL (ref 65–140)
GLUCOSE SERPL-MCNC: 320 MG/DL (ref 65–140)
GLUCOSE SERPL-MCNC: 344 MG/DL (ref 65–140)
GLUCOSE SERPL-MCNC: 93 MG/DL (ref 65–140)
HCT VFR BLD AUTO: 34.3 % (ref 36.5–49.3)
HGB BLD-MCNC: 11.4 G/DL (ref 12–17)
MCH RBC QN AUTO: 33.1 PG (ref 26.8–34.3)
MCHC RBC AUTO-ENTMCNC: 33.2 G/DL (ref 31.4–37.4)
MCV RBC AUTO: 100 FL (ref 82–98)
OSMOLALITY UR/SERPL-RTO: 306 MMOL/KG (ref 282–298)
PLATELET # BLD AUTO: 91 THOUSANDS/UL (ref 149–390)
PMV BLD AUTO: 9.7 FL (ref 8.9–12.7)
POTASSIUM SERPL-SCNC: 3.6 MMOL/L (ref 3.5–5.3)
POTASSIUM SERPL-SCNC: 4.3 MMOL/L (ref 3.5–5.3)
QRS AXIS: 8 DEGREES
QRSD INTERVAL: 90 MS
QT INTERVAL: 348 MS
QTC INTERVAL: 406 MS
RBC # BLD AUTO: 3.44 MILLION/UL (ref 3.88–5.62)
SODIUM SERPL-SCNC: 129 MMOL/L (ref 135–147)
SODIUM SERPL-SCNC: 129 MMOL/L (ref 135–147)
T WAVE AXIS: 136 DEGREES
TSH SERPL DL<=0.05 MIU/L-ACNC: 1.37 UIU/ML (ref 0.45–4.5)
URATE SERPL-MCNC: 5.2 MG/DL (ref 3.5–8.5)
VENTRICULAR RATE: 82 BPM
VIT B12 SERPL-MCNC: 737 PG/ML (ref 100–900)
WBC # BLD AUTO: 5.9 THOUSAND/UL (ref 4.31–10.16)

## 2023-02-25 RX ORDER — TORSEMIDE 20 MG/1
20 TABLET ORAL DAILY
Status: DISCONTINUED | OUTPATIENT
Start: 2023-02-25 | End: 2023-02-26

## 2023-02-25 RX ORDER — SODIUM CHLORIDE 1000 MG
2 TABLET, SOLUBLE MISCELLANEOUS ONCE
Status: COMPLETED | OUTPATIENT
Start: 2023-02-25 | End: 2023-02-25

## 2023-02-25 RX ADMIN — TIMOLOL MALEATE 1 DROP: 5 SOLUTION OPHTHALMIC at 09:35

## 2023-02-25 RX ADMIN — SODIUM CHLORIDE 2 G: 1 TABLET ORAL at 09:32

## 2023-02-25 RX ADMIN — OXYCODONE HYDROCHLORIDE AND ACETAMINOPHEN 500 MG: 500 TABLET ORAL at 09:34

## 2023-02-25 RX ADMIN — INSULIN LISPRO 3 UNITS: 100 INJECTION, SOLUTION INTRAVENOUS; SUBCUTANEOUS at 16:05

## 2023-02-25 RX ADMIN — CHOLECALCIFEROL TAB 25 MCG (1000 UNIT) 2000 UNITS: 25 TAB at 09:35

## 2023-02-25 RX ADMIN — ATORVASTATIN CALCIUM 40 MG: 40 TABLET, FILM COATED ORAL at 16:04

## 2023-02-25 RX ADMIN — INSULIN LISPRO 1 UNITS: 100 INJECTION, SOLUTION INTRAVENOUS; SUBCUTANEOUS at 11:47

## 2023-02-25 RX ADMIN — ALLOPURINOL 100 MG: 100 TABLET ORAL at 17:39

## 2023-02-25 RX ADMIN — ALLOPURINOL 100 MG: 100 TABLET ORAL at 09:32

## 2023-02-25 RX ADMIN — ZINC SULFATE 220 MG (50 MG) CAPSULE 220 MG: CAPSULE at 09:35

## 2023-02-25 RX ADMIN — ALPRAZOLAM 0.25 MG: 0.25 TABLET ORAL at 21:21

## 2023-02-25 RX ADMIN — LATANOPROST 1 DROP: 50 SOLUTION OPHTHALMIC at 21:21

## 2023-02-25 RX ADMIN — INSULIN LISPRO 2 UNITS: 100 INJECTION, SOLUTION INTRAVENOUS; SUBCUTANEOUS at 21:21

## 2023-02-25 RX ADMIN — CARVEDILOL 6.25 MG: 6.25 TABLET, FILM COATED ORAL at 16:04

## 2023-02-25 NOTE — OCCUPATIONAL THERAPY NOTE
Occupational Therapy Evaluation/Treatment     02/25/23 1430   Note Type   Note type Evaluation   Pain Assessment   Pain Assessment Tool 0-10   Pain Score No Pain   Restrictions/Precautions   Weight Bearing Precautions Per Order No   Other Precautions Chair Alarm; Bed Alarm; Fall Risk;Hard of hearing   Home Living   Type of [address] level; Able to live on main level with bedroom/bathroom   Miriam Other (Comment)  (no equipment at home)   Additional Comments Pt is an 78 y o  male admitted for Hyponatremia and Abnormal lab test    Prior Function   Level of [place] Independent with ADLs; Independent with functional mobility; Independent with IADLS   Lives With Son  (and sister in law)   [address] Help From Family   IADLs Independent with driving; Independent with meal prep   Vocational Part time employment  (per chart, 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)   Comments Per pt and chart pt lives with son and family  Pt was independent with BADLs, IADLs and functional mobility without AD  Pt drives   (per chart, 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)   Subjective   Subjective I can get up   ADL   Eating Assistance 5  Supervision/Setup   Grooming Assistance 5  Supervision/Setup   UB [address] 4  Minimal Assistance   LB [address] 3  Moderate Assistance   [address] 3  Moderate [address]  3  Moderate Assistance   Bed Mobility   Rolling R 4  Minimal assistance   Rolling L 4  Minimal assistance   Supine to Sit 4  Minimal assistance   Additional items Assist x 1;Verbal cues;LE management; Increased time required   Sit to Supine 4  Minimal assistance   Additional items Assist x 1;LE management; Increased time required;Verbal cues   Transfers   Sit to Stand 4  Minimal assistance   Additional items Assist x 1;Verbal cues   Stand to Sit 5  Supervision   Additional items Assist x 1;Verbal cues   Toilet transfer 4  Minimal assistance   Additional items Assist x 1;Verbal cues; Increased time required;Standard toilet   Balance   Static Sitting Good   Dynamic Sitting Fair   Static Standing Fair   Dynamic Standing Fair -   Activity Tolerance   Activity Tolerance Patient tolerated treatment well;Patient limited by fatigue   Nurse Made Aware yes, Katharine   RUE Assessment   RUE Assessment WFL   LUE Assessment   LUE Assessment WFL   Cognition   Overall Cognitive Status WFL   Arousal/Participation Alert; Responsive; Cooperative   Orientation Level Oriented to person;Oriented to place;Oriented to situation;Disoriented to time   Following Commands Follows multistep commands with increased time or repetition   Assessment   Limitation Decreased ADL status; Decreased UE strength;Decreased endurance;Decreased self-care trans;Decreased high-level ADLs   Prognosis Good   Assessment Patient evaluated by Occupational Therapy  Patient admitted with Acute kidney injury superimposed on chronic kidney disease ([facility] [address] )  The patients occupational profile, medical and therapy history includes a extensive additional review of physical, cognitive, or psychosocial history related to current functional performance  Comorbidities affecting functional mobility and ADLS include: CKD stage IV, diabetes mellitus type 2, hypertension, dyslipidemia, CAD, chronic A-fib, CHF (chronic combined CHF, EF 40%  Prior to admission, patient was independent with functional mobility without assistive device, independent with ADLS, independent with IADLS, ambulating community distances and works part time    The evaluation identifies the following performance deficits: impaired balance, decreased endurance, decreased coordination, increased fall risk, decreased ADLS, decreased IADLS, decreased activity tolerance and decreased strength, that result in activity limitations and/or participation restrictions  This evaluation requires clinical decision making of high complexity, because the patient presents with comorbidites that affect occupational performance and required significant modification of tasks or assistance with consideration of multiple treatment options  The Barthel Index was used as a functional outcome tool presenting with a score of Barthel Index Score: 40, indicating marked limitations of functional mobility and ADLS  The patient's raw score on the -PAC Daily Activity Inpatient Short Form is 16 A raw score of less than 19 suggests the patient may benefit from discharge to post-acute rehabilitation services  Please refer to the recommendation of the Occupational Therapist for safe discharge planning  Please refer to the recommendation of the Occupational Therapist for safe DC planning  Patient will benefit from skilled Occupational Therapy services to address above deficits and facilitate a safe return to prior level of function  Goals   Patient Goals go home   STG Time Frame   (1-7 days)   Short Term Goal  Goals established to promote Patient Goals: to go home:  Eating: independent; Grooming: independent seated; Bathing: min assist; Upper Body Dressing Supervision,  Lower Body Dressing: min assist; Toileting: min assist; Patient will increase ambulatory standard toilet transfer to supervision with rolling walker to increase performance and safety with ADLS and functional mobility; Patient will increase standing tolerance to 5 minutes during ADL task to decrease assistance level and decrease fall risk; Patient will increase bed mobility to supervision in preparation for ADLS and transfers;  Patient will increase functional mobility to and from bathroom with rolling walker with supervision to increase performance with ADLS and to use a toilet; Patient will tolerate 5 minutes of UE ROM/strengthening to increase general activity tolerance and performance in ADLS/IADLS; Patient will improve functional activity tolerance to 5 minutes of sustained functional tasks to increase participation in basic self-care and decrease assistance level;  Patient will be able to to verbalize understanding and perform energy conservation/proper body mechanics during ADLS and functional mobility at least 75% of the time with minimal cueing to decrease signs of fatigue and increase stamina to return to prior level of function; Patient will increase dynamic sitting balance to fair+ to improve the ability to sit at edge of bed or on a chair for ADLS;  Patient will increase static standing balance to fair+ to improve postural stability and decrease fall risk during standing ADLS and transfers  LTG Time Frame   (8-14 days)   Long Term Goal Grooming: independent standing at sink; Bathing: supervision; Upper Body Dressing independent; Lower Body Dressing: supervision; Toileting: supervision; Patient will increase ambulatory standard toilet transfer to independent and supervision with rolling walker to increase performance and safety with ADLS and functional mobility; Patient will increase standing tolerance to 8 minutes during ADL task to decrease assistance level and decrease fall risk; Patient will increase bed mobility to independent in preparation for ADLS and transfers;  Patient will increase functional mobility to and from bathroom with rolling walker independently to increase performance with ADLS and to use a toilet; Patient will tolerate 10 minutes of UE ROM/strengthening to increase general activity tolerance and performance in ADLS/IADLS; Patient will improve functional activity tolerance to 10 minutes of sustained functional tasks to increase participation in basic self-care and decrease assistance level;  Patient will be able to to verbalize understanding and perform energy conservation/proper body mechanics during ADLS and functional mobility at least 90% of the time with no cueing to decrease signs of fatigue and increase stamina to return to prior level of function; Patient will increase dynamic sitting balance to good to improve the ability to sit at edge of bed or on a chair for ADLS;  Patient will increase dynamic standing balance to fair to improve postural stability and decrease fall risk during standing ADLS and transfers  Pt will score >/= 20/24 on AM-PAC Daily Activity Inpatient scale to promote safe independence with ADLs and functional mobility; Pt will score >/= 75/100 on Barthel Index in order to decrease caregiver assistance needed and increase ability to perform ADLs and functional mobility  Plan   Treatment Interventions ADL retraining;Functional transfer training;UE strengthening/ROM; Continued evaluation; Activityengagement   Goal Expiration Date 03/11/23   OT Frequency 3-5x/wk   Recommendation   OT Discharge Recommendation Post acute rehabilitation services   AM-[facility] Daily Activity Inpatient   Lower Body Dressing 2   Bathing 2   Toileting 2   Upper Body Dressing 3   Grooming 3   Eating 4   Daily Activity Raw Score 16   Daily Activity Standardized Score (Calc for Raw Score >=11) 35 96   AM-PAC Applied Cognition Inpatient   Following a Speech/Presentation 4   Understanding Ordinary Conversation 4   Taking Medications 4   Remembering Where Things Are Placed or Put Away 4   Remembering List of 4-5 Errands 3   Taking Care of Complicated Tasks 3   Applied Cognition Raw Score 22   Applied Cognition Standardized Score 47 83   Barthel Index   Feeding 10   Bathing 0   Grooming Score 0   Dressing Score 5   Bladder Score 0  (catheter)   Bowels Score 5   Toilet Use Score 5   Transfers (Bed/Chair) Score 10   Mobility (Level Surface) Score 0   Stairs Score 5   Barthel Index Score 40   Additional Treatment Session   Start Time 1445   End Time 1455   Treatment Assessment S: "I can get up" O: Pt initially refused OT treatment/eval but agreed on 2nd attempt   Pt needed min A  for bed and functional mobility using RW  Patient ambulated short household distance to/from bathroom with RW and minimal Assist; pt needed mod cues for safe management and use of RW as pt is unsteady during activity and ambulation  Toilet hygiene completed with moderate to max A as pt  unable to manage hygiene care post toileting while seated and needed both hands for support while in stance; hand hygiene, face washing, teeth brushing while seated at EOB with Supervision and set-up  Also noted some flexion contracture to both hands at MCP joints which limits pt's use of both hands/grasp  Pt declined to stay up and sit on bedside chair, pt then requesting to return to supine ,sit to supine with min assist for LE management; A: Patient able to complete all ADLs and functional mobility without difficulty but needed increased time for completion, at end of session patient supine in bed with all needs met P: continue OT as per POC  Depending on pt's progress, STR vs  outpatient PT to increase balance/endurance as indicated     End of Consult   Education Provided Yes   Nurse Communication Nurse aware of consult   Licensure   [place] License Number  [id number] [facility] [address], OTR/L [id number]

## 2023-02-25 NOTE — ASSESSMENT & PLAN NOTE
Noted to urinary retention during hospitalization  Likely contributed to acute kidney injury  Seen by urology, recommended Dupont catheter placement  Ultrasound of the kidney/bladder as above  Mild hematuria post Dupont traumatic versus secondary decompression appears to have resolved  · Resume Eliquis without any recurrence of hematuria  · Monitor urine output  · Continue Dupont  · Follow-up with urology, patient will likely require cystoscopy +/- urodynamics

## 2023-02-25 NOTE — PROGRESS NOTES
NEPHROLOGY PROGRESS NOTE   Roseanna Webster 78 y o  male MRN: [id number]  Unit/Bed#: [address] [place] [address] Encounter: [id number]    ASSESSMENT & PLAN:  Acute kidney injury, POA  -Baseline creatinine: 2 5-3 0 mg/dl  -Admission creatinine: 3 62 mg/dl  Outpatient labs from 2/21/2023 showed creatinine 4 05 with sodium of 126  - Work up:   • UA with microscopy: UA with trace blood  1-2 RBC per high-power field  • Imaging:  Kidney ultrasound with no hydronephrosis  Diffusely echogenic kidney, finding of bladder wall thickening   -Etiology: Acute kidney injury likely due to prerenal azotemia/volume depletion in the setting of poor oral intake and diuretic use and also due to urinary retention  -Plan:   • Renal function improving with IV hydration and status post Dupont catheter placement on 2/23  Post Dupont had hematuria which is clearing up  IV fluids were stopped on 2/24  Renal function improving to creatinine 2 89 mg/dL, discussed with primary team that no need for IV fluids  Also discussed that as renal function improving, will start on torsemide at a lower dose at 20 mg daily for now compared to home dose of 40 mg daily  Continue to monitor volume status and renal function and primary team agreed with the plan  • Avoid nephrotoxins and dose all medications per EGFR  • Avoid hypotension                                              Chronic Kidney Disease Stage 4  -Outpatient Nephrologist: Dr Pk Butts  -Baseline Creatinine: 2 5-3 0  -Etiology: Suspected secondary to diabetic nephropathy, hypertensive nephrosclerosis, arteriolar nephrosclerosis and renovascular disease  -Avoid Nephrotoxins and Dose all medications per eGFR  -Will need outpatient follow up after discharge      Chronic systolic CHF with ejection fraction 40%  -Clinically appears euvolemic, likely had volume depletion prior to admission    As per outpatient note from February, weight was at high 140s and was on torsemide 40 mg daily with metolazone as needed  -As mentioned above restarting on diuretics at a lower dose 20 mg daily of torsemide, clinically appears euvolemic      Hyponatremia: Admission sodium was 129 on 2/22  -Sodium level initially improved with IV fluids but now again trended down to 129 meq/L  -Work-up for hyponatremia showed urine sodium 20, urine osmolality 441, TSH at normal range, uric acid 5 2, serum osmolality pending, a m  cortisol pending  -Work-up so far suggestive of hyponatremia from intravascular volume depletion versus CHF  We will monitor response to reinitiation of diuretics  Fluid restriction 1 5 L/day  Ordered for 1 dose of salt tablets 2 g      Elevated bicarb  -Bicarb level elevated at 32 but has improved since admission  Was elevated likely due to intravascular volume depletion  Continue to monitor     BP primary hypertension  -Home Medication: Include Coreg, Imdur, torsemide  -Currently BP acceptable, slightly on the lower side  Continue current treatment, restarting on torsemide as mentioned above    Urinary retention, status post Dupont catheter placement  -Initially had hematuria which is clearing up  Continue to monitor follow-up urology recommendations  As per urology note patient will also need cystoscopy to evaluate hematuria     Hypermagnesemia on outpatient labs, magnesium level now at normal range at 2 5, continue to monitor     Others: A-fib/diabetes mellitus type 2-management per primary team         SUBJECTIVE:  No new complaints  No chest pain or shortness of breath  As per patient he had a good sleep last night    Urine in Dupont catheter clearing up    OBJECTIVE:  Current Weight: Weight - Scale: 67 1 kg (148 lb)  Vitals:    02/24/23 2344   BP: 107/59   Pulse: 72   Resp: 18   Temp: 98 °F (36 7 °C)   SpO2: 97%       Intake/Output Summary (Last 24 hours) at 2/25/2023 0845  Last data filed at 2/25/2023 0521  Gross per 24 hour   Intake 940 ml   Output 1375 ml   Net -435 ml       Physical Exam  General: Ill looking, awake  Eyes: Conjunctivae pink,  Sclera anicteric  ENT: lips and mucous membranes moist  Neck: supple   Chest: Clear to Auscultation both lungs,  no crackles, ronchus or wheezing  CVS: S1 & S2 present, normal rate, regular rhythm, no murmur    Abdomen: soft, non-tender, non-distended, Bowel sounds normoactive  Extremities: no edema of  legs  Skin: no rash  Neuro: awake, alert, oriented x 3   Psych: Mood and affect appropriate   Dupont catheter with clear urine today    Medications:    Current Facility-Administered Medications:   •  acetaminophen (TYLENOL) tablet 650 mg, 650 mg, Oral, Q6H PRN, Eliazar Land MD  •  allopurinol (ZYLOPRIM) tablet 100 mg, 100 mg, Oral, BID, Eliazar Land MD, 100 mg at 02/24/23 1700  •  ALPRAZolam (XANAX) tablet 0 25 mg, 0 25 mg, Oral, HS, Eliazar Land MD, 0 25 mg at 02/24/23 2130  •  ascorbic acid (VITAMIN C) tablet 500 mg, 500 mg, Oral, Daily, Eliazar Land MD, 500 mg at 02/24/23 8150  •  atorvastatin (LIPITOR) tablet 40 mg, 40 mg, Oral, Daily With Dinner, Eliazar Land MD, 40 mg at 02/24/23 1651  •  carvedilol (COREG) tablet 6 25 mg, 6 25 mg, Oral, BID With Meals, Eliazar Land MD, 6 25 mg at 02/24/23 1651  •  cholecalciferol (VITAMIN D3) tablet 2,000 Units, 2,000 Units, Oral, Daily, Eliazar Land MD, 2,000 Units at 02/24/23 0842  •  insulin lispro (HumaLOG) 100 units/mL subcutaneous injection 1-5 Units, 1-5 Units, Subcutaneous, TID AC, 2 Units at 02/24/23 1652 **AND** Fingerstick Glucose (POCT), , , TID AC, Eliazar Land MD  •  insulin lispro (HumaLOG) 100 units/mL subcutaneous injection 1-5 Units, 1-5 Units, Subcutaneous, HS, Eliazar Land MD, 1 Units at 02/24/23 2130  •  isosorbide mononitrate (IMDUR) 24 hr tablet 30 mg, 30 mg, Oral, Daily, Eliazar Land MD, 30 mg at 02/24/23 0845  •  latanoprost (XALATAN) 0 005 % ophthalmic solution 1 drop, 1 drop, Both Eyes, HS, Eliazar Land MD, 1 drop at 02/24/23 3090  •  timolol (TIMOPTIC) 0 5 % ophthalmic solution 1 drop, 1 drop, Both Eyes, Daily, Savanah Wolff MD, 1 drop at 02/24/23 0843  •  zinc sulfate (ZINCATE) capsule 220 mg, 220 mg, Oral, Daily, Savanah Wolff MD, 220 mg at 02/24/23 0842    Invasive Devices:   Urethral Catheter Latex 16 Fr  (Active)   Reasons to continue Urinary Catheter  Acute urinary retention/obstruction failing urinary retention protocol 02/24/23 0850   Goal for Removal Will consult urology 02/24/23 0850   Site Assessment Clean;Skin intact 02/24/23 0850   Dupont Care Done 02/24/23 0850   Collection Container Standard drainage bag 02/24/23 0850   Securement Method Securing device (Describe) 02/24/23 0850   Output (mL) 500 mL 02/24/23 0556       Lab Results:   Results from last 7 days   Lab Units 02/25/23  0515 02/24/23  0456 02/23/23  0502 02/22/23  1351 02/21/23  0000 02/21/23  0000   WBC Thousand/uL 5 90 7 45 7 85 7 47   < > 6 60   HEMOGLOBIN g/dL 11 4* 12 5 13 9 13 7  --  13 2   HEMATOCRIT % 34 3* 37 2 41 3 40 8  --  39 0   PLATELETS Thousands/uL 91* 109* 139* 111*  --  134*   POTASSIUM mmol/L 3 6 3 7 3 1* 3 5   < > 3 5   CHLORIDE mmol/L 93* 92* 89* 82*   < > 77   CO2 mmol/L 32 32 35* 37*   < > 33   BUN mg/dL 93* 103* 112* 125*   < > 116   CREATININE mg/dL 2 89* 3 11* 3 28* 3 62*   < > 4 05   CALCIUM mg/dL 7 8* 8 2* 8 7 8 7   < > 9 0   MAGNESIUM mg/dL  --   --  2 5 2 5  --  2 7*   PHOSPHORUS mg/dL  --   --  3 0 3 6  --  4 1   ALK PHOS U/L  --   --   --  149*  --  199   ALT U/L  --   --   --  17  --  19   AST U/L  --   --   --  16  --  19    < > = values in this interval not displayed  Previous work up:         Portions of the record may have been created with voice recognition software  Occasional wrong word or "sound a like" substitutions may have occurred due to the inherent limitations of voice recognition software  Read the chart carefully and recognize, using context, where substitutions have occurred  If you have any questions, please contact the dictating provider

## 2023-02-25 NOTE — PROGRESS NOTES
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 ([place] [address] )    Chronic combined systolic and diastolic congestive heart failure (HCC)    Atrial fibrillation ([place] [address] )    Stage 4 chronic kidney disease ([place] [address] )    Hyponatremia    Urinary retention    Essential hypertension    Macrocytosis      Assessment & Plan:    * Acute kidney injury superimposed on chronic kidney disease ([place] [address] )  Assessment & Plan  Referred to ED with worsening of renal function on outpatient blood test, BUN/creatinine- 116/4 0  Repeat blood test in ED, BUNs/creatinine 125/3 6  UA- trace blood, 1-2 RBC  Suspect prerenal azotemia in setting of poor p o  intake, diuretic use  Also postrenal secondary to urinary retention  Bladder scan more than 300 cc status post straight cath with with 700 cc of urine  Dupont catheter was placed  Ultrasound of the kidney was done which revealed atrophic echogenic kidney consistent with medical renal disease, no hydronephrosis  Asymmetric urinary bladder wall thickening noted involving anterior superior right aspect of the bladder    Irregular shape of the urinary bladder noted, physiologic versus Wide neck right-sided bladder diverticulum  Nephrology following, input appreciated  Creatinine is downtrending to 2 8 s/p IV fluid and Dupont catheter placement  · Continue to hold diuretics  · Hold torsemide  · Monitor blood pressure  · Continue to monitor off IV fluids  · Continue Dupont  · Monitor intake output  · Avoid nephrotoxins/hypotension  · Nephrology follow-up    Urinary retention  Assessment & Plan  Noted to urinary retention during hospitalization  Likely contributed to acute kidney injury  Seen by urology, recommended Dupont catheter placement  Ultrasound of the kidney/bladder as above  Mild hematuria post Dupont traumatic versus secondary decompression appears to be improving  · Holding anticoagulation until resolution of hematuria  · Monitor urine output  · Continue Dupont  · Follow-up with urology, patient will likely require cystoscopy +/- urodynamics    Hyponatremia  Assessment & Plan  Sodium 129 on admission  Initially improved with IV fluid, now downtrending again back to 129 today  Urine sodium 20, urine osmolality 441  Uric acid 5 2, TSH normal  · Fluid restriction  · Salt tablet x1  · Follow-up cortisol, serum osmolality    Stage 4 chronic kidney disease (HCC)  Assessment & Plan  History of CKD stage IV, baseline creatinine 2  5-3  Suspected to be secondary to diabetic nephropathy, hypertensive nephrosclerosis and renovascular disease  Follows with nephrology        Atrial fibrillation [facility])  Assessment & Plan  History of chronic A-fib, controlled, continue carvedilol,  Holding Eliquis in setting of hematuria    Chronic combined systolic and diastolic congestive heart failure (HCC)  Assessment & Plan  Wt Readings from Last 3 Encounters:   02/22/23 67 1 kg (148 lb)   02/09/23 67 1 kg (148 lb)   11/07/22 70 8 kg (156 lb)     Echocardiogram-EF 64-37%, grade 2 diastolic dysfunction, moderate concentric hypertrophy  History of pericardial effusion  Currently appears close to euvolemia  · Continue to hold torsemide  · Monitor off IV fluids  · Monitor intake output  · Daily weight      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 [facility])  Assessment & Plan  Lab Results   Component Value Date    HGBA1C 8 3 (H) 02/23/2023       Recent Labs     02/25/23  1126 02/25/23  1558 02/25/23  1602 02/25/23  2037   POCGLU 192* 344* 320* 229*       Blood Sugar Average: Last 72 hrs:  (P) 184 [id number]     No further morning hypoglycemia, postprandial hyperglycemia noted  Resume Januvia at 25 mg daily and glimepiride 2 mg q  breakfast  Insulin sliding scale  Monitor p o  intake    Macrocytosis  Assessment & Plan  Hemoglobin stable  B12 normal  TSH normal    Essential hypertension  Assessment & Plan  Stable  Monitor on carvedilol    hypo kalemia-repleted, improved      VTE Pharmacologic Prophylaxis: VTE Score: 3 Moderate Risk (Score 3-4) - Pharmacological DVT Prophylaxis Ordered: apixaban (Eliquis)  On hold    Patient Centered Rounds: I performed bedside rounds with nursing staff today  Discussions with Specialists or Other Care Team Provider: Nephrology    Education and Discussions with Family / Patient: Updated  (son) at bedside  Time Spent for Care: 55 minutes  More than 50% of total time spent on counseling and coordination of care as described above  Current Length of Stay: 3 day(s)  Current Patient Status: Inpatient   Certification Statement: The patient will continue to require additional inpatient hospital stay due to Acute kidney injury  Discharge Plan: Anticipate discharge in 24-48 hrs to home with home services  Code Status: Level 1 - Full Code    Subjective:   Comfortably sleeping in bed, does not offer any complaint  Denies any chest pain shortness of breath or abdominal pain  Reports that he is eating well    Remains afebrile with stable blood pressure    Continues to downtrend but remains with hyponatremia  Hemoglobin and platelets are slightly lower    TSH normal  Urine sodium 20, urine osmolality 441  Uric acid 5 2    Objective:     Vitals:   Temp (24hrs), Av 4 °F (36 9 °C), Min:98 °F (36 7 °C), Max:98 9 °F (37 2 °C)    Temp:  [98 °F (36 7 °C)-98 9 °F (37 2 °C)] 98 °F (36 7 °C)  HR:  [72-84] 72  Resp:  [16-18] 18  BP: (105-109)/(54-59) 107/59  SpO2:  [97 %-98 %] 97 %  Body mass index is 21 86 kg/m²  Input and Output Summary (last 24 hours):      Intake/Output Summary (Last 24 hours) at 2023 0847  Last data filed at 2/25/2023 0149  Gross per 24 hour   Intake 940 ml   Output 1375 ml   Net -435 ml       Physical Exam:   Physical Exam  Constitutional:       General: He is not in acute distress  Comments: Elderly, frail   HENT:      Head: Normocephalic and atraumatic  Cardiovascular:      Rate and Rhythm: Normal rate  Rhythm irregular  Pulmonary:      Effort: Pulmonary effort is normal  No respiratory distress  Breath sounds: No wheezing, rhonchi or rales  Comments: Diminished but, clear bilaterally  Chest:      Chest wall: No tenderness  Abdominal:      General: Bowel sounds are normal  There is no distension  Palpations: Abdomen is soft  Tenderness: There is no abdominal tenderness  There is no guarding or rebound  Genitourinary:     Comments: Dupont catheter in place-minimally pink-tinged urine  Skin:     General: Skin is warm and dry  Findings: No rash  Neurological:      Mental Status: He is alert  Mental status is at baseline  Cranial Nerves: No cranial nerve deficit  Additional Data:     Labs:  Results from last 7 days   Lab Units 02/25/23  0515 02/23/23  0502 02/22/23  1351   WBC Thousand/uL 5 90   < > 7 47   HEMOGLOBIN g/dL 11 4*   < > 13 7   HEMATOCRIT % 34 3*   < > 40 8   PLATELETS Thousands/uL 91*   < > 111*   NEUTROS PCT %  --   --  79*   LYMPHS PCT %  --   --  9*   MONOS PCT %  --   --  12   EOS PCT %  --   --  0    < > = values in this interval not displayed       Results from last 7 days   Lab Units 02/25/23  0515 02/23/23  0502 02/22/23  1351   SODIUM mmol/L 129*   < > 129*   POTASSIUM mmol/L 3 6   < > 3 5   CHLORIDE mmol/L 93*   < > 82*   CO2 mmol/L 32   < > 37*   BUN mg/dL 93*   < > 125*   CREATININE mg/dL 2 89*   < > 3 62*   ANION GAP mmol/L 4   < > 10   CALCIUM mg/dL 7 8*   < > 8 7   ALBUMIN g/dL  --   --  3 6   TOTAL BILIRUBIN mg/dL  --   --  1 42*   ALK PHOS U/L  --   --  149*   ALT U/L  --   --  17   AST U/L  --   --  16   GLUCOSE RANDOM mg/dL 102 < > 215*    < > = values in this interval not displayed  Results from last 7 days   Lab Units 02/25/23  0710 02/25/23  0126 02/24/23  2052 02/24/23  1606 02/24/23  1117 02/24/23  0744 02/24/23  0722 02/24/23  0222 02/23/23  2049 02/23/23  1618 02/23/23  1132 02/23/23  0714   POC GLUCOSE mg/dl 93 172* 186* 273* 203* 67 57* 93 176* 260* 273* 64*     Results from last 7 days   Lab Units 02/23/23  0502   HEMOGLOBIN A1C % 8 3*           Lines/Drains:  Invasive Devices     Peripheral Intravenous Line  Duration           Peripheral IV 02/22/23 Right Forearm 2 days          Drain  Duration           Urethral Catheter Latex 16 Fr  1 day                      Imaging: No pertinent imaging reviewed  Recent Cultures (last 7 days):         Last 24 Hours Medication List:   Current Facility-Administered Medications   Medication Dose Route Frequency Provider Last Rate   • acetaminophen  650 mg Oral Q6H PRN Dayna Ivy MD     • allopurinol  100 mg Oral BID Dayna Ivy MD     • ALPRAZolam  0 25 mg Oral HS Dayna Ivy MD     • ascorbic acid  500 mg Oral Daily Dayna Ivy MD     • atorvastatin  40 mg Oral Daily With Yg Cobb MD     • carvedilol  6 25 mg Oral BID With Meals Dayna Ivy MD     • cholecalciferol  2,000 Units Oral Daily Dayna Ivy MD     • insulin lispro  1-5 Units Subcutaneous TID Bebe Victor MD     • insulin lispro  1-5 Units Subcutaneous HS Dayna Ivy MD     • isosorbide mononitrate  30 mg Oral Daily Dayna Ivy MD     • latanoprost  1 drop Both Eyes HS Dayna Ivy MD     • timolol  1 drop Both Eyes Daily Dayna Ivy MD     • zinc sulfate  220 mg Oral Daily Dayna Ivy MD          Today, Patient Was Seen By: Dayna Ivy MD    ** Please Note: "This note has been constructed using a voice recognition system  Therefore there may be syntax, spelling, and/or grammatical errors   Please call if you have any questions  "**

## 2023-02-25 NOTE — PLAN OF CARE
Problem: Potential for Falls  Goal: Patient will remain free of falls  Description: INTERVENTIONS:  - Educate patient/family on patient safety including physical limitations  - Instruct patient to call for assistance with activity   - Consult OT/PT to assist with strengthening/mobility   - Keep Call bell within reach  - Keep bed low and locked with side rails adjusted as appropriate  - Keep care items and personal belongings within reach  - Initiate and maintain comfort rounds  - Make Fall Risk Sign visible to staff  - Offer Toileting every 2 Hours, in advance of need  - Initiate/Maintain bed alarm  - Obtain necessary fall risk management equipment:   - Apply yellow socks and bracelet for high fall risk patients  - Consider moving patient to room near nurses station  2/24/2023 2323 by Dwain Eves Fahr  Outcome: Progressing       Problem: MOBILITY - ADULT  Goal: Maintain or return to baseline ADL function  Description: INTERVENTIONS:  -  Assess patient's ability to carry out ADLs; assess patient's baseline for ADL function and identify physical deficits which impact ability to perform ADLs (bathing, care of mouth/teeth, toileting, grooming, dressing, etc )  - Assess/evaluate cause of self-care deficits   - Assess range of motion  - Assess patient's mobility; develop plan if impaired  - Assess patient's need for assistive devices and provide as appropriate  - Encourage maximum independence but intervene and supervise when necessary  - Involve family in performance of ADLs  - Assess for home care needs following discharge   - Consider OT consult to assist with ADL evaluation and planning for discharge  - Provide patient education as appropriate  2/24/2023 2323 by Dwain Eves Fahr  Outcome: Progressing       Problem: GENITOURINARY - ADULT  Goal: Urinary catheter remains patent  Description: INTERVENTIONS:  - Assess patency of urinary catheter  - Follow guidelines for intermittent irrigation of non-functioning urinary catheter  Outcome: Progressing

## 2023-02-26 LAB
ANION GAP SERPL CALCULATED.3IONS-SCNC: 6 MMOL/L (ref 4–13)
BUN SERPL-MCNC: 84 MG/DL (ref 5–25)
CALCIUM SERPL-MCNC: 7.8 MG/DL (ref 8.4–10.2)
CHLORIDE SERPL-SCNC: 95 MMOL/L (ref 96–108)
CO2 SERPL-SCNC: 31 MMOL/L (ref 21–32)
CORTIS SERPL-MCNC: 14 UG/DL
CORTIS SERPL-MCNC: 20.6 UG/DL
CORTIS SERPL-MCNC: 32.4 UG/DL
CREAT SERPL-MCNC: 2.78 MG/DL (ref 0.6–1.3)
ERYTHROCYTE [DISTWIDTH] IN BLOOD BY AUTOMATED COUNT: 14.7 % (ref 11.6–15.1)
GFR SERPL CREATININE-BSD FRML MDRD: 19 ML/MIN/1.73SQ M
GLUCOSE SERPL-MCNC: 105 MG/DL (ref 65–140)
GLUCOSE SERPL-MCNC: 126 MG/DL (ref 65–140)
GLUCOSE SERPL-MCNC: 228 MG/DL (ref 65–140)
GLUCOSE SERPL-MCNC: 273 MG/DL (ref 65–140)
GLUCOSE SERPL-MCNC: 350 MG/DL (ref 65–140)
HCT VFR BLD AUTO: 36.3 % (ref 36.5–49.3)
HGB BLD-MCNC: 12.1 G/DL (ref 12–17)
MCH RBC QN AUTO: 33.2 PG (ref 26.8–34.3)
MCHC RBC AUTO-ENTMCNC: 33.3 G/DL (ref 31.4–37.4)
MCV RBC AUTO: 100 FL (ref 82–98)
PLATELET # BLD AUTO: 89 THOUSANDS/UL (ref 149–390)
PMV BLD AUTO: 9.4 FL (ref 8.9–12.7)
POTASSIUM SERPL-SCNC: 4.5 MMOL/L (ref 3.5–5.3)
RBC # BLD AUTO: 3.65 MILLION/UL (ref 3.88–5.62)
SODIUM SERPL-SCNC: 132 MMOL/L (ref 135–147)
WBC # BLD AUTO: 5.19 THOUSAND/UL (ref 4.31–10.16)

## 2023-02-26 RX ORDER — TORSEMIDE 20 MG/1
20 TABLET ORAL EVERY OTHER DAY
Status: DISCONTINUED | OUTPATIENT
Start: 2023-02-28 | End: 2023-02-27 | Stop reason: HOSPADM

## 2023-02-26 RX ORDER — TORSEMIDE 20 MG/1
40 TABLET ORAL EVERY OTHER DAY
Status: DISCONTINUED | OUTPATIENT
Start: 2023-02-27 | End: 2023-02-27 | Stop reason: HOSPADM

## 2023-02-26 RX ORDER — COSYNTROPIN 0.25 MG/ML
0.25 INJECTION, POWDER, FOR SOLUTION INTRAMUSCULAR; INTRAVENOUS ONCE
Status: COMPLETED | OUTPATIENT
Start: 2023-02-26 | End: 2023-02-26

## 2023-02-26 RX ORDER — GLIMEPIRIDE 2 MG/1
2 TABLET ORAL
Status: DISCONTINUED | OUTPATIENT
Start: 2023-02-26 | End: 2023-02-27 | Stop reason: HOSPADM

## 2023-02-26 RX ADMIN — INSULIN LISPRO 3 UNITS: 100 INJECTION, SOLUTION INTRAVENOUS; SUBCUTANEOUS at 22:40

## 2023-02-26 RX ADMIN — ZINC SULFATE 220 MG (50 MG) CAPSULE 220 MG: CAPSULE at 08:07

## 2023-02-26 RX ADMIN — OXYCODONE HYDROCHLORIDE AND ACETAMINOPHEN 500 MG: 500 TABLET ORAL at 08:07

## 2023-02-26 RX ADMIN — SITAGLIPTIN 25 MG: 25 TABLET, FILM COATED ORAL at 08:07

## 2023-02-26 RX ADMIN — APIXABAN 2.5 MG: 2.5 TABLET, FILM COATED ORAL at 17:11

## 2023-02-26 RX ADMIN — INSULIN LISPRO 2 UNITS: 100 INJECTION, SOLUTION INTRAVENOUS; SUBCUTANEOUS at 11:27

## 2023-02-26 RX ADMIN — GLIMEPIRIDE 2 MG: 2 TABLET ORAL at 08:07

## 2023-02-26 RX ADMIN — COSYNTROPIN 0.25 MG: 0.25 INJECTION, POWDER, LYOPHILIZED, FOR SOLUTION INTRAMUSCULAR; INTRAVENOUS at 10:00

## 2023-02-26 RX ADMIN — ALLOPURINOL 100 MG: 100 TABLET ORAL at 17:00

## 2023-02-26 RX ADMIN — ISOSORBIDE MONONITRATE 30 MG: 30 TABLET, EXTENDED RELEASE ORAL at 08:07

## 2023-02-26 RX ADMIN — ALLOPURINOL 100 MG: 100 TABLET ORAL at 08:07

## 2023-02-26 RX ADMIN — TORSEMIDE 20 MG: 20 TABLET ORAL at 08:07

## 2023-02-26 RX ADMIN — LATANOPROST 1 DROP: 50 SOLUTION OPHTHALMIC at 22:40

## 2023-02-26 RX ADMIN — CHOLECALCIFEROL TAB 25 MCG (1000 UNIT) 2000 UNITS: 25 TAB at 08:07

## 2023-02-26 RX ADMIN — ALPRAZOLAM 0.25 MG: 0.25 TABLET ORAL at 22:40

## 2023-02-26 RX ADMIN — ATORVASTATIN CALCIUM 40 MG: 40 TABLET, FILM COATED ORAL at 16:13

## 2023-02-26 RX ADMIN — INSULIN LISPRO 2 UNITS: 100 INJECTION, SOLUTION INTRAVENOUS; SUBCUTANEOUS at 16:13

## 2023-02-26 RX ADMIN — CARVEDILOL 6.25 MG: 6.25 TABLET, FILM COATED ORAL at 08:07

## 2023-02-26 RX ADMIN — TIMOLOL MALEATE 1 DROP: 5 SOLUTION OPHTHALMIC at 08:07

## 2023-02-26 NOTE — PROGRESS NOTES
NEPHROLOGY PROGRESS NOTE   Shade Albright 78 y o  male MRN: [id number]  Unit/Bed#: [address] [place] [address] Encounter: [id number]    ASSESSMENT & PLAN:  Acute kidney injury, POA  -Baseline creatinine: 2 5-3 0 mg/dl  -Admission creatinine: 3 62 mg/dl  Outpatient labs from 2/21/2023 showed creatinine 4 05 with sodium of 126  - Work up:   • UA with microscopy: UA with trace blood  1-2 RBC per high-power field  • Imaging:  Kidney ultrasound with no hydronephrosis  Diffusely echogenic kidney, finding of bladder wall thickening   -Etiology: Acute kidney injury likely due to prerenal azotemia/volume depletion in the setting of poor oral intake and diuretic use and also due to urinary retention  -Plan:   • Renal function improving with IV hydration and status post Dupont catheter placement on 2/23  Post Dupont had hematuria which has cleared  IV fluids were stopped on 2/24  Was started on torsemide 20 mg daily on 2/25 which is lower than home dose of 40 mg  renal function improving to creatinine 2 78 mg/dL, discussed with primary team that as patient has now improved oral intake, would increase the torsemide to 20 mg alternating with 40 mg starting with 40 mg tomorrow and monitor renal function   • Avoid nephrotoxins and dose all medications per EGFR  • Avoid hypotension                                              Chronic Kidney Disease Stage 4  -Outpatient Nephrologist: Dr Fortino Pimentel  -Baseline Creatinine: 2 5-3 0  -Etiology: Suspected secondary to diabetic nephropathy, hypertensive nephrosclerosis, arteriolar nephrosclerosis and renovascular disease  -Avoid Nephrotoxins and Dose all medications per eGFR  -Will need outpatient follow up after discharge      Chronic systolic CHF with ejection fraction 40%  -Clinically appears euvolemic, likely had volume depletion prior to admission    As per outpatient note from February, weight was at high 140s and was on torsemide 40 mg daily with metolazone as needed  -Increased torsemide to 20 mg alternating with 40 mg    Hyponatremia: Admission sodium was 129 on 2/22  -Sodium level initially improved with IV fluids but now again trended down to 129 meq/L  -Work-up for hyponatremia showed urine sodium 20, urine osmolality 441, TSH at normal range, uric acid 5 2, serum osmolality at normal range at 306  A m  cortisol was low at 6 8 on 2/25  -Status post 2 g salt tablets on 2/25 and restarting diuretics, sodium level improving to 132 meq/L today  Continue to monitor with diuretics, as serum osmolality was at normal range, there is concern for pseudohyponatremia so checking SPEP UPEP light chain ratio  Recommend fluid restriction 1 5 L/day  Discussed with primary team about checking SPEP UPEP and agreed with the plan  -Order for cosyntropin stimulation test     Elevated bicarb  -Bicarb level elevated at 31 but has improved since admission  Was elevated likely due to intravascular volume depletion  Continue to monitor     BP primary hypertension  -Home Medication: Include Coreg, Imdur, torsemide  -Currently BP stable and is at goal, continue current treatment  Avoid hypotension  Currently on torsemide, Coreg  Urinary retention, status post Dupont catheter placement  -Initially had hematuria which is clearing up  Continue to monitor follow-up urology recommendations  As per urology note patient will also need cystoscopy to evaluate hematuria     Hypermagnesemia on outpatient labs, magnesium level now at normal range at 2 5, continue to monitor     Others: A-fib/diabetes mellitus type 2-management per primary team         SUBJECTIVE:  No new complaints    No chest pain or shortness of breath, no nausea vomiting    OBJECTIVE:  Current Weight: Weight - Scale: 67 1 kg (148 lb)  Vitals:    02/26/23 0718   BP: 120/57   Pulse: 85   Resp: 18   Temp: 98 2 °F (36 8 °C)   SpO2: 98%       Intake/Output Summary (Last 24 hours) at 2/26/2023 0850  Last data filed at 2/26/2023 0718  Gross per 24 hour   Intake 965 ml   Output 995 ml   Net -30 ml       Physical Exam  General:  Ill looking, awake  Eyes: Conjunctivae pink,  Sclera anicteric  ENT: lips and mucous membranes moist  Neck: supple   Chest: Clear to Auscultation both lungs,  no crackles, ronchus or wheezing  CVS: S1 & S2 present, normal rate, regular rhythm, no murmur    Abdomen: soft, non-tender, non-distended, Bowel sounds normoactive  Extremities: no edema of  legs  Skin: no rash  Neuro: awake, alert, oriented  Psych: Mood and affect appropriate   Dupont catheter with clear urine    Medications:    Current Facility-Administered Medications:   •  acetaminophen (TYLENOL) tablet 650 mg, 650 mg, Oral, Q6H PRN, Danika Jimenez MD  •  allopurinol (ZYLOPRIM) tablet 100 mg, 100 mg, Oral, BID, Danika Jimenez MD, 100 mg at 02/26/23 0807  •  ALPRAZolam (XANAX) tablet 0 25 mg, 0 25 mg, Oral, HS, Danika Jimenez MD, 0 25 mg at 02/25/23 2121  •  ascorbic acid (VITAMIN C) tablet 500 mg, 500 mg, Oral, Daily, Danika Jimenez MD, 500 mg at 02/26/23 0807  •  atorvastatin (LIPITOR) tablet 40 mg, 40 mg, Oral, Daily With Dinner, Danika Jimenez MD, 40 mg at 02/25/23 1604  •  carvedilol (COREG) tablet 6 25 mg, 6 25 mg, Oral, BID With Meals, Danika Jimenez MD, 6 25 mg at 02/26/23 0807  •  cholecalciferol (VITAMIN D3) tablet 2,000 Units, 2,000 Units, Oral, Daily, Danika Jimenez MD, 2,000 Units at 02/26/23 0807  •  glimepiride (AMARYL) tablet 2 mg, 2 mg, Oral, Daily With Breakfast, Danika Jimenez MD, 2 mg at 02/26/23 0807  •  insulin lispro (HumaLOG) 100 units/mL subcutaneous injection 1-5 Units, 1-5 Units, Subcutaneous, TID AC, 3 Units at 02/25/23 1605 **AND** Fingerstick Glucose (POCT), , , TID AC, Danika Jimenez MD  •  insulin lispro (HumaLOG) 100 units/mL subcutaneous injection 1-5 Units, 1-5 Units, Subcutaneous, HS, Danika Jimenez MD, 2 Units at 02/25/23 2121  •  isosorbide mononitrate (IMDUR) 24 hr tablet 30 mg, 30 mg, Oral, Daily, Danika Jimenez MD, 30 mg at 02/26/23 0937  •  latanoprost (XALATAN) 0 005 % ophthalmic solution 1 drop, 1 drop, Both Eyes, HS, Tete Trevino MD, 1 drop at 02/25/23 2121  •  sitaGLIPtin (JANUVIA) tablet 25 mg, 25 mg, Oral, Daily, Tete Trevino MD, 25 mg at 02/26/23 0807  •  timolol (TIMOPTIC) 0 5 % ophthalmic solution 1 drop, 1 drop, Both Eyes, Daily, Tete Trevino MD, 1 drop at 02/26/23 0807  •  torsemide (DEMADEX) tablet 20 mg, 20 mg, Oral, Daily, David Sosa MD, 20 mg at 02/26/23 0807  •  zinc sulfate (ZINCATE) capsule 220 mg, 220 mg, Oral, Daily, Tete Trevino MD, 220 mg at 02/26/23 0807    Invasive Devices:   Urethral Catheter Latex 16 Fr   (Active)   Reasons to continue Urinary Catheter  Acute urinary retention/obstruction failing urinary retention protocol 02/24/23 0850   Goal for Removal Will consult urology 02/24/23 0850   Site Assessment Clean;Skin intact 02/24/23 0850   Dupont Care Done 02/24/23 0850   Collection Container Standard drainage bag 02/24/23 0850   Securement Method Securing device (Describe) 02/24/23 0850   Output (mL) 500 mL 02/24/23 0556       Lab Results:   Results from last 7 days   Lab Units 02/26/23  0522 02/25/23  1600 02/25/23  0515 02/24/23  0456 02/23/23  0502 02/22/23  1351 02/21/23  0000 02/21/23  0000   WBC Thousand/uL 5 19  --  5 90 7 45 7 85 7 47   < > 6 60   HEMOGLOBIN g/dL 12 1  --  11 4* 12 5 13 9 13 7  --  13 2   HEMATOCRIT % 36 3*  --  34 3* 37 2 41 3 40 8  --  39 0   PLATELETS Thousands/uL 89*  --  91* 109* 139* 111*  --  134*   POTASSIUM mmol/L 4 5 4 3 3 6 3 7 3 1* 3 5   < > 3 5   CHLORIDE mmol/L 95* 92* 93* 92* 89* 82*   < > 77   CO2 mmol/L 31 30 32 32 35* 37*   < > 33   BUN mg/dL 84* 88* 93* 103* 112* 125*   < > 116   CREATININE mg/dL 2 78* 2 81* 2 89* 3 11* 3 28* 3 62*   < > 4 05   CALCIUM mg/dL 7 8* 8 1* 7 8* 8 2* 8 7 8 7   < > 9 0   MAGNESIUM mg/dL  --   --   --   --  2 5 2 5  --  2 7*   PHOSPHORUS mg/dL  --   --   --   --  3 0 3 6  --  4 1   ALK PHOS U/L  --   --   --   --   --  149*  -- 199   ALT U/L  --   --   --   --   --  17  --  19   AST U/L  --   --   --   --   --  16  --  19    < > = values in this interval not displayed  Previous work up:         Portions of the record may have been created with voice recognition software  Occasional wrong word or "sound a like" substitutions may have occurred due to the inherent limitations of voice recognition software  Read the chart carefully and recognize, using context, where substitutions have occurred  If you have any questions, please contact the dictating provider

## 2023-02-26 NOTE — PROGRESS NOTES
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 ([place] [address] )    Chronic combined systolic and diastolic congestive heart failure (HCC)    Atrial fibrillation ([place] [address] )    Stage 4 chronic kidney disease ([place] [address] )    Hyponatremia    Urinary retention    Essential hypertension    Macrocytosis      Assessment & Plan:    * Acute kidney injury superimposed on chronic kidney disease ([place] [address] )  Assessment & Plan  Referred to ED with worsening of renal function on outpatient blood test, BUN/creatinine- 116/4 0  Repeat blood test in ED, BUNs/creatinine 125/3 6  UA- trace blood, 1-2 RBC  Suspect prerenal azotemia in setting of poor p o  intake, diuretic use  Also postrenal secondary to urinary retention  Bladder scan more than 300 cc status post straight cath with with 700 cc of urine  Dupont catheter was placed  Ultrasound of the kidney was done which revealed atrophic echogenic kidney consistent with medical renal disease, no hydronephrosis  Asymmetric urinary bladder wall thickening noted involving anterior superior right aspect of the bladder    Irregular shape of the urinary bladder noted, physiologic versus Wide neck right-sided bladder diverticulum  Nephrology following, input appreciated  Creatinine is downtrending to 2 78 s/p IV fluid and Dupont catheter placement  Creatinine remains at baseline  · Resuming torsemide 20 mg alternating with 40 mg  · Monitor blood pressure  · Follow-up BMP  · Continue Dupont  · Monitor intake output  · Avoid nephrotoxins/hypotension  · Nephrology follow-up    Urinary retention  Assessment & Plan  Noted to urinary retention during hospitalization  Likely contributed to acute kidney injury  Seen by urology, recommended Dupont catheter placement  Ultrasound of the kidney/bladder as above  Mild hematuria post Dupont traumatic versus secondary decompression appears to have resolved  · Resume Eliquis later today if hematuria remains clear  · Monitor urine output  · Continue Dupont  · Follow-up with urology, patient will likely require cystoscopy +/- urodynamics    Hyponatremia  Assessment & Plan  Sodium 129 on admission  Initially improved with IV fluid, now downtrending again back to 129 Urine sodium 20, urine osmolality 441  Uric acid 5 2, TSH normal, cortisol 6 8, serum osmolality 306  Sodium level improved to 132 today  · Fluid restriction  · Resuming torsemide  · Cosyntropin stimulation test  · SPEP, UPEP, light chain ratio    Stage 4 chronic kidney disease (HCC)  Assessment & Plan  History of CKD stage IV, baseline creatinine 2  5-3  Suspected to be secondary to diabetic nephropathy, hypertensive nephrosclerosis and renovascular disease  Follows with nephrology        Atrial fibrillation [facility])  Assessment & Plan  History of chronic A-fib, controlled, continue carvedilol,  Will resume Eliquis today if urine remains clear    Chronic combined systolic and diastolic congestive heart failure (HCC)  Assessment & Plan  Wt Readings from Last 3 Encounters:   02/22/23 67 1 kg (148 lb)   02/09/23 67 1 kg (148 lb)   11/07/22 70 8 kg (156 lb)     Echocardiogram-EF 06-21%, grade 2 diastolic dysfunction, moderate concentric hypertrophy  History of pericardial effusion  Currently appears close to euvolemia  · Resuming torsemide  · Monitor intake output  · Daily weight      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 ([facility] [address] )  Assessment & Plan  Lab Results   Component Value Date    HGBA1C 8 3 (H) 02/23/2023       Recent Labs     02/25/23  2037 02/26/23  0718 02/26/23  1100 02/26/23  1546   POCGLU 229* 105 228* 273*       Blood Sugar Average: Last 72 hrs:  (P) 039 [id number]     No further morning hypoglycemia, postprandial hyperglycemia noted  Resumed Januvia at 25 mg daily and glimepiride 2 mg q  breakfast  Will monitor trend and adjust as needed  Insulin sliding scale  Monitor p o  intake    Macrocytosis  Assessment & Plan  Hemoglobin stable  B12 normal  TSH normal    Essential hypertension  Assessment & Plan  Stable  Monitor on carvedilol    hypo kalemia-repleted, improved      VTE Pharmacologic Prophylaxis: VTE Score: 3 Moderate Risk (Score 3-4) - Pharmacological DVT Prophylaxis Ordered: apixaban (Eliquis)  On hold    Patient Centered Rounds: I performed bedside rounds with nursing staff today  Discussions with Specialists or Other Care Team Provider: Nephrology    Education and Discussions with Family / Patient: Updated  (son) at bedside  Time Spent for Care: 55 minutes  More than 50% of total time spent on counseling and coordination of care as described above  Current Length of Stay: 4 day(s)  Current Patient Status: Inpatient   Certification Statement: The patient will continue to require additional inpatient hospital stay due to Acute kidney injury  Discharge Plan: Anticipate discharge in 24-48 hrs to home with home services  Code Status: Level 1 - Full Code    Subjective:     Noted to be comfortably lying in bed  Does not offer any complaint  Reports that his appetite is good  Denies any chest pain shortness of breath or abdominal pain  Hematuria improved earlier but noted to have mild pink-tinged urine after changing position    Objective:     Vitals:   Temp (24hrs), Av °F (36 7 °C), Min:97 9 °F (36 6 °C), Max:98 2 °F (36 8 °C)    Temp:  [97 9 °F (36 6 °C)-98 2 °F (36 8 °C)] 98 2 °F (36 8 °C)  HR:  [80-85] 85  Resp:  [18-20] 18  BP: (117-122)/(56-63) 120/57  SpO2:  [97 %-98 %] 98 % on room air  Body mass index is 21 86 kg/m²  Input and Output Summary (last 24 hours):      Intake/Output Summary (Last 24 hours) at 2/26/2023 8755  Last data filed at 2/26/2023 0901  Gross per 24 hour   Intake 1205 ml   Output 995 ml   Net 210 ml       Physical Exam:   Physical Exam  Constitutional:       General: He is not in acute distress  Comments: Elderly, frail   HENT:      Head: Normocephalic and atraumatic  Cardiovascular:      Rate and Rhythm: Normal rate  Rhythm irregular  Pulmonary:      Effort: Pulmonary effort is normal  No respiratory distress  Breath sounds: No wheezing, rhonchi or rales  Comments: Diminished, clear bilaterally  Chest:      Chest wall: No tenderness  Abdominal:      General: Bowel sounds are normal  There is no distension  Palpations: Abdomen is soft  Tenderness: There is no abdominal tenderness  There is no guarding or rebound  Genitourinary:     Comments: Dupont catheter in place  Skin:     General: Skin is warm and dry  Findings: No rash  Neurological:      Mental Status: He is alert  Mental status is at baseline  Cranial Nerves: No cranial nerve deficit  Additional Data:     Labs:  Results from last 7 days   Lab Units 02/26/23  0522 02/23/23  0502 02/22/23  1351   WBC Thousand/uL 5 19   < > 7 47   HEMOGLOBIN g/dL 12 1   < > 13 7   HEMATOCRIT % 36 3*   < > 40 8   PLATELETS Thousands/uL 89*   < > 111*   NEUTROS PCT %  --   --  79*   LYMPHS PCT %  --   --  9*   MONOS PCT %  --   --  12   EOS PCT %  --   --  0    < > = values in this interval not displayed       Results from last 7 days   Lab Units 02/26/23  0522 02/23/23  0502 02/22/23  1351   SODIUM mmol/L 132*   < > 129*   POTASSIUM mmol/L 4 5   < > 3 5   CHLORIDE mmol/L 95*   < > 82*   CO2 mmol/L 31   < > 37*   BUN mg/dL 84*   < > 125*   CREATININE mg/dL 2 78*   < > 3 62*   ANION GAP mmol/L 6   < > 10   CALCIUM mg/dL 7 8*   < > 8 7   ALBUMIN g/dL  --   --  3 6   TOTAL BILIRUBIN mg/dL  --   --  1 42*   ALK PHOS U/L  --   --  149*   ALT U/L  --   --  17   AST U/L  --   --  16   GLUCOSE RANDOM mg/dL 126   < > 215*    < > = values in this interval not displayed           Results from last 7 days   Lab Units 02/26/23  0718 02/25/23  2037 02/25/23  1602 02/25/23  1558 02/25/23  1126 02/25/23  0710 02/25/23  0126 02/24/23 2052 02/24/23  1606 02/24/23  1117 02/24/23  0744 02/24/23  0722   POC GLUCOSE mg/dl 105 229* 320* 344* 192* 93 172* 186* 273* 203* 67 57*     Results from last 7 days   Lab Units 02/23/23  0502   HEMOGLOBIN A1C % 8 3*           Lines/Drains:  Invasive Devices     Peripheral Intravenous Line  Duration           Peripheral IV 02/22/23 Right Forearm 3 days          Drain  Duration           Urethral Catheter Latex 16 Fr  2 days                      Imaging: Reviewed radiology reports from this admission including: ultrasound(s)    Recent Cultures (last 7 days):         Last 24 Hours Medication List:   Current Facility-Administered Medications   Medication Dose Route Frequency Provider Last Rate   • acetaminophen  650 mg Oral Q6H PRN Polly Herr MD     • allopurinol  100 mg Oral BID Polly Herr MD     • ALPRAZolam  0 25 mg Oral HS Polly Herr MD     • ascorbic acid  500 mg Oral Daily Polly Herr MD     • atorvastatin  40 mg Oral Daily With Letha Hodges MD     • carvedilol  6 25 mg Oral BID With Meals Polly Herr MD     • cholecalciferol  2,000 Units Oral Daily Polly Herr MD     • cosyntropin  0 25 mg Intravenous Once Stephanie Salas MD     • glimepiride  2 mg Oral Daily With Breakfast Polly Herr MD     • insulin lispro  1-5 Units Subcutaneous TID AC Polly Herr MD     • insulin lispro  1-5 Units Subcutaneous HS Polly Herr MD     • isosorbide mononitrate  30 mg Oral Daily Polly Herr MD     • latanoprost  1 drop Both Eyes HS Polly Herr MD     • sitaGLIPtin  25 mg Oral Daily Polly Herr MD     • timolol  1 drop Both Eyes Daily Polly Herr MD     • [START ON 2/28/2023] torsemide  20 mg Oral Every Other Day Stephanie Salas MD     • Karl Section ON 2/27/2023] torsemide  40 mg Oral Every Other Day Oni Mcclure MD     • zinc sulfate  220 mg Oral Daily Claude Dupree MD          Today, Patient Was Seen By: Claude Dupree MD    ** Please Note: "This note has been constructed using a voice recognition system  Therefore there may be syntax, spelling, and/or grammatical errors   Please call if you have any questions  "**

## 2023-02-26 NOTE — PLAN OF CARE
Problem: Potential for Falls  Goal: Patient will remain free of falls  Description: INTERVENTIONS:  - Educate patient/family on patient safety including physical limitations  - Instruct patient to call for assistance with activity   - Consult OT/PT to assist with strengthening/mobility   - Keep Call bell within reach  - Keep bed low and locked with side rails adjusted as appropriate  - Keep care items and personal belongings within reach  - Initiate and maintain comfort rounds  - Make Fall Risk Sign visible to staff  - Offer Toileting every 2 Hours, in advance of need  - Initiate/Maintain bed alarm  - Obtain necessary fall risk management equipment: Call bell  - Apply yellow socks and bracelet for high fall risk patients  - Consider moving patient to room near nurses station  Outcome: Progressing     Problem: MOBILITY - ADULT  Goal: Maintain or return to baseline ADL function  Description: INTERVENTIONS:  -  Assess patient's ability to carry out ADLs; assess patient's baseline for ADL function and identify physical deficits which impact ability to perform ADLs (bathing, care of mouth/teeth, toileting, grooming, dressing, etc )  - Assess/evaluate cause of self-care deficits   - Assess range of motion  - Assess patient's mobility; develop plan if impaired  - Assess patient's need for assistive devices and provide as appropriate  - Encourage maximum independence but intervene and supervise when necessary  - Involve family in performance of ADLs  - Assess for home care needs following discharge   - Consider OT consult to assist with ADL evaluation and planning for discharge  - Provide patient education as appropriate  Outcome: Progressing  Goal: Maintains/Returns to pre admission functional level  Description: INTERVENTIONS:  - Perform BMAT or MOVE assessment daily    - Set and communicate daily mobility goal to care team and patient/family/caregiver     - Collaborate with rehabilitation services on mobility goals if consulted  - Perform Range of Motion 2 times a day  - Reposition patient every 2 hours  - Dangle patient 2 times a day  - Stand patient 2 times a day  - Ambulate patient 2 times a day  - Out of bed to chair 2 times a day   - Out of bed for meals 2 times a day  - Out of bed for toileting  - Record patient progress and toleration of activity level   Outcome: Progressing     Problem: Prexisting or High Potential for Compromised Skin Integrity  Goal: Skin integrity is maintained or improved  Description: INTERVENTIONS:  - Identify patients at risk for skin breakdown  - Assess and monitor skin integrity  - Assess and monitor nutrition and hydration status  - Monitor labs   - Assess for incontinence   - Turn and reposition patient  - Assist with mobility/ambulation  - Relieve pressure over bony prominences  - Avoid friction and shearing  - Provide appropriate hygiene as needed including keeping skin clean and dry  - Evaluate need for skin moisturizer/barrier cream  - Collaborate with interdisciplinary team   - Patient/family teaching  - Consider wound care consult   Outcome: Progressing     Problem: Nutrition/Hydration-ADULT  Goal: Nutrient/Hydration intake appropriate for improving, restoring or maintaining nutritional needs  Description: Monitor and assess patient's nutrition/hydration status for malnutrition  Collaborate with interdisciplinary team and initiate plan and interventions as ordered  Monitor patient's weight and dietary intake as ordered or per policy  Utilize nutrition screening tool and intervene as necessary  Determine patient's food preferences and provide high-protein, high-caloric foods as appropriate       INTERVENTIONS:  - Monitor oral intake, urinary output, labs, and treatment plans  - Assess nutrition and hydration status and recommend course of action  - Evaluate amount of meals eaten  - Assist patient with eating if necessary   - Allow adequate time for meals  - Recommend/ encourage appropriate diets, oral nutritional supplements, and vitamin/mineral supplements  - Order, calculate, and assess calorie counts as needed  - Recommend, monitor, and adjust tube feedings and TPN/PPN based on assessed needs  - Assess need for intravenous fluids  - Provide specific nutrition/hydration education as appropriate  - Include patient/family/caregiver in decisions related to nutrition  Outcome: Progressing     Problem: GENITOURINARY - ADULT  Goal: Urinary catheter remains patent  Description: INTERVENTIONS:  - Assess patency of urinary catheter  - If patient has a chronic victor, consider changing catheter if non-functioning  - Follow guidelines for intermittent irrigation of non-functioning urinary catheter  Outcome: Progressing

## 2023-02-26 NOTE — PLAN OF CARE
Problem: Potential for Falls  Goal: Patient will remain free of falls  Description: INTERVENTIONS:  - Educate patient/family on patient safety including physical limitations  - Instruct patient to call for assistance with activity   - Consult OT/PT to assist with strengthening/mobility   - Keep Call bell within reach  - Keep bed low and locked with side rails adjusted as appropriate  - Keep care items and personal belongings within reach  - Initiate and maintain comfort rounds  - Make Fall Risk Sign visible to staff  - Offer Toileting every 2 Hours, in advance of need  - Initiate/Maintain bed alarm  - Obtain necessary fall risk management equipment:   - Apply yellow socks and bracelet for high fall risk patients  - Consider moving patient to room near nurses station  Outcome: Progressing     Problem: MOBILITY - ADULT  Goal: Maintain or return to baseline ADL function  Description: INTERVENTIONS:  -  Assess patient's ability to carry out ADLs; assess patient's baseline for ADL function and identify physical deficits which impact ability to perform ADLs (bathing, care of mouth/teeth, toileting, grooming, dressing, etc )  - Assess/evaluate cause of self-care deficits   - Assess range of motion  - Assess patient's mobility; develop plan if impaired  - Assess patient's need for assistive devices and provide as appropriate  - Encourage maximum independence but intervene and supervise when necessary  - Involve family in performance of ADLs  - Assess for home care needs following discharge   - Consider OT consult to assist with ADL evaluation and planning for discharge  - Provide patient education as appropriate  Outcome: Progressing     Problem: GENITOURINARY - ADULT  Goal: Urinary catheter remains patent  Description: INTERVENTIONS:  - Assess patency of urinary catheter  Outcome: Progressing

## 2023-02-27 VITALS
OXYGEN SATURATION: 99 % | DIASTOLIC BLOOD PRESSURE: 64 MMHG | RESPIRATION RATE: 18 BRPM | SYSTOLIC BLOOD PRESSURE: 109 MMHG | HEART RATE: 85 BPM | HEIGHT: 69 IN | TEMPERATURE: 98.5 F | WEIGHT: 148 LBS | BODY MASS INDEX: 21.92 KG/M2

## 2023-02-27 LAB
ANION GAP SERPL CALCULATED.3IONS-SCNC: 8 MMOL/L (ref 4–13)
BUN SERPL-MCNC: 93 MG/DL (ref 5–25)
CALCIUM SERPL-MCNC: 7.5 MG/DL (ref 8.4–10.2)
CHLORIDE SERPL-SCNC: 95 MMOL/L (ref 96–108)
CO2 SERPL-SCNC: 27 MMOL/L (ref 21–32)
CREAT SERPL-MCNC: 2.82 MG/DL (ref 0.6–1.3)
ERYTHROCYTE [DISTWIDTH] IN BLOOD BY AUTOMATED COUNT: 14.6 % (ref 11.6–15.1)
GFR SERPL CREATININE-BSD FRML MDRD: 19 ML/MIN/1.73SQ M
GLUCOSE SERPL-MCNC: 223 MG/DL (ref 65–140)
GLUCOSE SERPL-MCNC: 225 MG/DL (ref 65–140)
GLUCOSE SERPL-MCNC: 249 MG/DL (ref 65–140)
GLUCOSE SERPL-MCNC: 349 MG/DL (ref 65–140)
HCT VFR BLD AUTO: 34.4 % (ref 36.5–49.3)
HGB BLD-MCNC: 11.8 G/DL (ref 12–17)
MCH RBC QN AUTO: 34 PG (ref 26.8–34.3)
MCHC RBC AUTO-ENTMCNC: 34.3 G/DL (ref 31.4–37.4)
MCV RBC AUTO: 99 FL (ref 82–98)
PLATELET # BLD AUTO: 95 THOUSANDS/UL (ref 149–390)
PMV BLD AUTO: 10.5 FL (ref 8.9–12.7)
POTASSIUM SERPL-SCNC: 3.9 MMOL/L (ref 3.5–5.3)
RBC # BLD AUTO: 3.47 MILLION/UL (ref 3.88–5.62)
SODIUM SERPL-SCNC: 130 MMOL/L (ref 135–147)
WBC # BLD AUTO: 6.29 THOUSAND/UL (ref 4.31–10.16)

## 2023-02-27 RX ORDER — TORSEMIDE 20 MG/1
20 TABLET ORAL EVERY OTHER DAY
Qty: 30 TABLET | Refills: 0 | Status: SHIPPED | OUTPATIENT
Start: 2023-02-28

## 2023-02-27 RX ORDER — GLIMEPIRIDE 2 MG/1
2 TABLET ORAL ONCE
Status: COMPLETED | OUTPATIENT
Start: 2023-02-27 | End: 2023-02-27

## 2023-02-27 RX ORDER — GLIMEPIRIDE 4 MG/1
4 TABLET ORAL
Refills: 0
Start: 2023-02-27

## 2023-02-27 RX ORDER — GLIMEPIRIDE 2 MG/1
2 TABLET ORAL
Qty: 30 TABLET | Refills: 0 | Status: SHIPPED | OUTPATIENT
Start: 2023-02-27

## 2023-02-27 RX ORDER — GLIMEPIRIDE 2 MG/1
2 TABLET ORAL
Status: DISCONTINUED | OUTPATIENT
Start: 2023-02-27 | End: 2023-02-27 | Stop reason: HOSPADM

## 2023-02-27 RX ADMIN — TORSEMIDE 40 MG: 20 TABLET ORAL at 08:45

## 2023-02-27 RX ADMIN — GLIMEPIRIDE 2 MG: 2 TABLET ORAL at 07:40

## 2023-02-27 RX ADMIN — APIXABAN 2.5 MG: 2.5 TABLET, FILM COATED ORAL at 08:47

## 2023-02-27 RX ADMIN — ATORVASTATIN CALCIUM 40 MG: 40 TABLET, FILM COATED ORAL at 16:25

## 2023-02-27 RX ADMIN — ZINC SULFATE 220 MG (50 MG) CAPSULE 220 MG: CAPSULE at 08:48

## 2023-02-27 RX ADMIN — ALLOPURINOL 100 MG: 100 TABLET ORAL at 08:46

## 2023-02-27 RX ADMIN — INSULIN LISPRO 3 UNITS: 100 INJECTION, SOLUTION INTRAVENOUS; SUBCUTANEOUS at 07:38

## 2023-02-27 RX ADMIN — OXYCODONE HYDROCHLORIDE AND ACETAMINOPHEN 500 MG: 500 TABLET ORAL at 08:47

## 2023-02-27 RX ADMIN — CHOLECALCIFEROL TAB 25 MCG (1000 UNIT) 2000 UNITS: 25 TAB at 08:45

## 2023-02-27 RX ADMIN — ISOSORBIDE MONONITRATE 30 MG: 30 TABLET, EXTENDED RELEASE ORAL at 08:47

## 2023-02-27 RX ADMIN — INSULIN LISPRO 1 UNITS: 100 INJECTION, SOLUTION INTRAVENOUS; SUBCUTANEOUS at 16:25

## 2023-02-27 RX ADMIN — SITAGLIPTIN 25 MG: 25 TABLET, FILM COATED ORAL at 08:44

## 2023-02-27 RX ADMIN — GLIMEPIRIDE 2 MG: 2 TABLET ORAL at 08:48

## 2023-02-27 RX ADMIN — GLIMEPIRIDE 2 MG: 2 TABLET ORAL at 16:24

## 2023-02-27 RX ADMIN — INSULIN LISPRO 2 UNITS: 100 INJECTION, SOLUTION INTRAVENOUS; SUBCUTANEOUS at 11:36

## 2023-02-27 RX ADMIN — TIMOLOL MALEATE 1 DROP: 5 SOLUTION OPHTHALMIC at 08:49

## 2023-02-27 NOTE — PLAN OF CARE
Problem: Potential for Falls  Goal: Patient will remain free of falls  Description: INTERVENTIONS:  - Educate patient/family on patient safety including physical limitations  - Instruct patient to call for assistance with activity   - Consult OT/PT to assist with strengthening/mobility   - Keep Call bell within reach  - Keep bed low and locked with side rails adjusted as appropriate  - Keep care items and personal belongings within reach  - Initiate and maintain comfort rounds  - Make Fall Risk Sign visible to staff  - Offer Toileting every 2 Hours, in advance of need  - Initiate/Maintain bed alarm  - Obtain necessary fall risk management equipment: nonskid footwear, reinforcing use of call bell for assistance  - Apply yellow socks and bracelet for high fall risk patients  - Consider moving patient to room near nurses station  Outcome: Progressing     Problem: MOBILITY - ADULT  Goal: Maintain or return to baseline ADL function  Description: INTERVENTIONS:  -  Assess patient's ability to carry out ADLs; assess patient's baseline for ADL function and identify physical deficits which impact ability to perform ADLs (bathing, care of mouth/teeth, toileting, grooming, dressing, etc )  - Assess/evaluate cause of self-care deficits   - Assess range of motion  - Assess patient's mobility; develop plan if impaired  - Assess patient's need for assistive devices and provide as appropriate  - Encourage maximum independence but intervene and supervise when necessary  - Involve family in performance of ADLs  - Assess for home care needs following discharge   - Consider OT consult to assist with ADL evaluation and planning for discharge  - Provide patient education as appropriate  Outcome: Progressing  Goal: Maintains/Returns to pre admission functional level  Description: INTERVENTIONS:  - Perform BMAT or MOVE assessment daily    - Set and communicate daily mobility goal to care team and patient/family/caregiver     - Collaborate with rehabilitation services on mobility goals if consulted  - Perform Range of Motion 3 times a day  - Reposition patient every 2 hours  - Dangle patient 1 times a day  - Stand patient 1 times a day  - Ambulate patient 1 times a day  - Out of bed to chair 1 times a day   - Out of bed for meals 1 times a day  - Out of bed for toileting  - Record patient progress and toleration of activity level   Outcome: Progressing     Problem: Prexisting or High Potential for Compromised Skin Integrity  Goal: Skin integrity is maintained or improved  Description: INTERVENTIONS:  - Identify patients at risk for skin breakdown  - Assess and monitor skin integrity  - Assess and monitor nutrition and hydration status  - Monitor labs   - Assess for incontinence   - Turn and reposition patient  - Assist with mobility/ambulation  - Relieve pressure over bony prominences  - Avoid friction and shearing  - Provide appropriate hygiene as needed including keeping skin clean and dry  - Evaluate need for skin moisturizer/barrier cream  - Collaborate with interdisciplinary team   - Patient/family teaching  - Consider wound care consult   Outcome: Progressing     Problem: Nutrition/Hydration-ADULT  Goal: Nutrient/Hydration intake appropriate for improving, restoring or maintaining nutritional needs  Description: Monitor and assess patient's nutrition/hydration status for malnutrition  Collaborate with interdisciplinary team and initiate plan and interventions as ordered  Monitor patient's weight and dietary intake as ordered or per policy  Utilize nutrition screening tool and intervene as necessary  Determine patient's food preferences and provide high-protein, high-caloric foods as appropriate       INTERVENTIONS:  - Monitor oral intake, urinary output, labs, and treatment plans  - Assess nutrition and hydration status and recommend course of action  - Evaluate amount of meals eaten  - Assist patient with eating if necessary   - Allow adequate time for meals  - Recommend/ encourage appropriate diets, oral nutritional supplements, and vitamin/mineral supplements  - Order, calculate, and assess calorie counts as needed  - Recommend, monitor, and adjust tube feedings and TPN/PPN based on assessed needs  - Assess need for intravenous fluids  - Provide specific nutrition/hydration education as appropriate  - Include patient/family/caregiver in decisions related to nutrition  Outcome: Progressing     Problem: GENITOURINARY - ADULT  Goal: Urinary catheter remains patent  Description: INTERVENTIONS:  - Assess patency of urinary catheter  - If patient has a chronic victor, consider changing catheter if non-functioning  - Follow guidelines for intermittent irrigation of non-functioning urinary catheter  Outcome: Progressing

## 2023-02-27 NOTE — DISCHARGE INSTR - AVS FIRST PAGE
Continue Dupont catheter    Blood test to monitor kidney function in 1 week  Continue diuretics, torsemide 40 mg alternating with 20 mg every other day      Follow-up with urology and nephrology    Follow-up pending blood test, SPEP/free light chain ratio/UPEP when follow-up with nephrology    Januvia was changed to 25 mg daily (appropriate dose based on his renal function)  Glimepiride 4 mg in a m  and 2 mg in p m      Monitor for high and low blood sugar  Monitor blood glucose before meals and at bedtime

## 2023-02-27 NOTE — DISCHARGE SUMMARY
Discharge Summary - [facility] Internal Medicine    Patient Information: Helen Nunn 78 y o  male MRN: [id number]  Unit/Bed#: [address] [place] [address] Encounter: [id number]    Discharging Physician / Practitioner: Daniel Hope MD  PCP: Keri Waters DO  Admission Date: 2/22/2023  Discharge Date: 02/27/23    Reason for Admission: Abnormal Lab (States had blood work done yesterday and told to come in for elevated BUN and Mag level was off)      Discharge Diagnoses:     Principal Problem:    Acute kidney injury superimposed on chronic kidney disease ([facility] )  Active Problems:    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 ([place] [address] )    Chronic combined systolic and diastolic congestive heart failure (HCC)    Atrial fibrillation ([place] [address] )    Stage 4 chronic kidney disease ([place] [address] )    Hyponatremia    Urinary retention    Essential hypertension    Macrocytosis  Resolved Problems:    * No resolved hospital problems  *        * Acute kidney injury superimposed on chronic kidney disease ([facility] )  Assessment & Plan  Referred to ED with worsening of renal function on outpatient blood test, BUN/creatinine- 116/4 0  Repeat blood test in ED, BUNs/creatinine 125/3 6  UA- trace blood, 1-2 RBC  Suspect prerenal azotemia in setting of poor p o  intake, diuretic use  Also postrenal secondary to urinary retention  Bladder scan more than 300 cc status post straight cath with with 700 cc of urine  Dupont catheter was placed  Ultrasound of the kidney was done which revealed atrophic echogenic kidney consistent with medical renal disease, no hydronephrosis  Asymmetric urinary bladder wall thickening noted involving anterior superior right aspect of the bladder    Irregular shape of the urinary bladder noted, physiologic versus Wide neck right-sided bladder diverticulum  Nephrology following, input appreciated  Creatinine is downtrending to baseline 2 8 s/p IV fluid and Dupont catheter placement  Creatinine remains at baseline  · Resuming torsemide 20 mg alternating with 40 mg  · Monitor blood pressure  · Follow-up BMP in 1 week  · Continue Dupont  · Monitor intake output  · Avoid nephrotoxins/hypotension  · Nephrology follow-up after discharge    Urinary retention  Assessment & Plan  Noted to urinary retention during hospitalization  Likely contributed to acute kidney injury  Seen by urology, recommended Dupont catheter placement  Ultrasound of the kidney/bladder as above  Mild hematuria post Dupont traumatic versus secondary decompression appears to have resolved  · Resume Eliquis without any recurrence of hematuria  · Monitor urine output  · Continue Dupont  · Follow-up with urology, patient will likely require cystoscopy +/- urodynamics    Hyponatremia  Assessment & Plan  Sodium 129 on admission  Initially improved with IV fluid, now downtrending again back to 129 Urine sodium 20, urine osmolality 441  Uric acid 5 2, TSH normal, cortisol 6 8, serum osmolality 306  Corrected sodium level improved to 132-134  · Fluid restriction  · Resuming torsemide  · Cosyntropin stimulation test without any evidence of adrenal insufficiency  · Follow-up SPEP, UPEP, light chain ratio  · BMP in 1 week  · Follow-up with nephrology    Stage 4 chronic kidney disease (HCC)  Assessment & Plan  History of CKD stage IV, baseline creatinine 2  5-3  Suspected to be secondary to diabetic nephropathy, hypertensive nephrosclerosis and renovascular disease  Follows with nephrology        Atrial fibrillation [facility])  Assessment & Plan  History of chronic A-fib, controlled, continue carvedilol,  Eliquis resumed    Chronic combined systolic and diastolic congestive heart failure (HCC)  Assessment & Plan  Wt Readings from Last 3 Encounters:   02/22/23 67 1 kg (148 lb)   02/09/23 67 1 kg (148 lb)   11/07/22 70 8 kg (156 lb)     Echocardiogram-EF 57-39%, grade 2 diastolic dysfunction, moderate concentric hypertrophy  History of pericardial effusion  Currently appears close to euvolemia  · Resuming torsemide as above  · Monitor intake output  · Daily weight      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 [facility])  Assessment & Plan  Lab Results   Component Value Date    HGBA1C 8 3 (H) 02/23/2023       Recent Labs     02/26/23  2048 02/27/23  0712 02/27/23  1132 02/27/23  1541   POCGLU 350* 349* 225* 223*       Blood Sugar Average: Last 72 hrs:  (P) 750 [id number]     No further  hypoglycemia, postprandial hyperglycemia noted  Resumed Januvia at 25 mg daily   Blood glucose trend noted still with postprandial hyperglycemia during daytime  Will increase glimepiride to 4 mg every morning and 2 mg every afternoon  Advised to monitor blood glucose daily 2-4 times a day and follow-up with    Macrocytosis  Assessment & Plan  Hemoglobin stable  B12 normal  TSH normal    Essential hypertension  Assessment & Plan  Stable  Monitor on carvedilol      Consultations During Hospital Stay:  IP CONSULT TO NEPHROLOGY  IP CONSULT TO UROLOGY    Procedures Performed:     · None    Significant Findings:     · Refer to hospital course and above listed diagnosis related plan for details    Imaging while in hospital:    US kidney and bladder    Result Date: 2/23/2023  Narrative: RENAL ULTRASOUND INDICATION:   Renal failure  COMPARISON: Renal ultrasound 4/11/2019 TECHNIQUE:   Ultrasound of the retroperitoneum was performed with a curvilinear transducer utilizing volumetric sweeps and still imaging techniques  FINDINGS: KIDNEYS: Symmetric and atrophic bilateral kidneys Right kidney:  8 8 x 4 8 x 4 4 cm  Volume 98 7 mL Left kidney:  8 7 x 3 6 x 3 0 cm  Volume 48 0 mL Right kidney The renal parenchyma is diffusely echogenic consistent with medical renal disease  No mass is identified  No hydronephrosis  No shadowing calculi  No perinephric fluid collections  Left kidney The renal parenchyma is diffusely echogenic consistent with medical renal disease   No mass is identified  No hydronephrosis  No shadowing calculi  No perinephric fluid collections  URETERS: Nonvisualized  BLADDER: Normally distended  There is bladder wall thickening, which can represent cystitis or trabeculations secondary to urinary bladder outlet obstruction  However, there is asymmetric increased thickening involving the anterior, superior and right aspect of the urinary bladder  Irregular contour of the urinary bladder on the right side, likely representing presence of a wide neck diverticulum  Bilateral ureteral jets detected  Impression: *  Atrophic echogenic bilateral kidneys, compatible with medical renal disease  No hydronephrosis  *  Asymmetric urinary bladder wall thickening involving the anterior, superior right aspect of the urinary bladder, findings can represent asymmetric trabeculations, underlying urothelial neoplasm or less likely focal cystitis  Correlation with urinalysis and urologic consultation is recommended  *   Physiologic irregular shape of the urinary bladder versus presence of a wide neck right-sided urinary bladder diverticulum  The study was marked in [facility] for immediate notification  Workstation performed: [id number]       Incidental Findings:   · Ultrasound finding as above which will require follow-up with urology    Test Results Pending at Discharge (will require follow up):   · As per After Visit Summary, SPEP, UPEP, free light chain ratio     Outpatient Tests Requested:  · BMP in 1 week  · Blood glucose monitoring    Complications:  Refer to hospital course and above listed diagnosis related plan, if any    Hospital Course:    As per HPI  Meche Dooley is a 78 y o  male patient with history of CKD stage IV, diabetes mellitus type 2, hypertension, dyslipidemia, CAD, chronic A-fib, CHF who originally presented to the hospital on 2/22/2023 due to abnormal blood test   Patient had routine blood work done and was noted to have elevated creatinine from baseline, hyponatremia and hypermagnesemia  Patient did not report any complaints but son reported that patient is increasingly weak fatigued with poor appetite over last few weeks      In ED, vital signs were stable, saturating adequately on room air  Lab in ED revealed sodium of 129, creatinine of 3 6 and BUN of 125  Hemoglobin was stable  Cardiac markers were unremarkable  Patient received 500 cc of IV fluid and was subsequently admitted  Patient appeared clinically dry was treated with gentle hydration  With gradual improvement  Patient was also noted to have urinary retention required Dupont catheter placement with more than 700 cc of urine output  Patient was seen by nephrology and urology  Patient's acute kidney injury deemed to be secondary to prerenal due to decreased p o  intake and diuretic use in addition to urinary retention  Renal function gradually improved after Dupont placement and returned to his baseline  Patient was also noted to have hyponatremia for which patient underwent work-up but gradually sodium level improved  Patient also had transient hematuria after Dupont catheter placement which improved after holding anticoagulation for short duration  Patient was noted to have evidence of morning hypoglycemia and postprandial hyperglycemia during hospitalization and diabetic regimen was cautiously adjusted  Patient improved to baseline and was discharged home with Dupont catheter  He is to follow-up with urology for cystoscopy for further evaluation of urinary retention  Patient will have repeat blood work in 1 week and will follow-up with nephrology for renal function and electrolytes  Patient will also follow-up with PCP for diabetic monitoring and management            Please see above list of diagnoses and related plan for additional information         Condition at Discharge: fair     Discharge Day Visit / Exam:     Subjective: Feels well  Looking forward to go home  Denies any chest pain shortness of breath or abdominal pain  Reports good p o  intake      Vitals: Blood Pressure: 107/66 (02/27/23 0716)  Pulse: 65 (02/27/23 0716)  Temperature: 98 °F (36 7 °C) (02/27/23 0716)  Temp Source: Oral (02/27/23 0716)  Respirations: 18 (02/27/23 0716)  Height: 5' 9" (175 3 cm) (02/22/23 1115)  Weight - Scale: 67 1 kg (148 lb) (02/22/23 1115)  SpO2: 99 % (02/27/23 0716)  Exam:   Physical Exam  Constitutional:       General: He is not in acute distress  Comments: Elderly, frail   HENT:      Head: Normocephalic and atraumatic  Cardiovascular:      Rate and Rhythm: Normal rate  Pulmonary:      Effort: Pulmonary effort is normal  No respiratory distress  Breath sounds: No wheezing, rhonchi or rales  Chest:      Chest wall: No tenderness  Abdominal:      General: Bowel sounds are normal  There is no distension  Palpations: Abdomen is soft  Tenderness: There is no abdominal tenderness  There is no guarding or rebound  Genitourinary:     Comments: Dupont catheter with clear yellow urine  Musculoskeletal:      Cervical back: Neck supple  Right lower leg: No edema  Left lower leg: No edema  Skin:     General: Skin is warm and dry  Findings: No rash  Neurological:      Mental Status: He is alert  Mental status is at baseline  Cranial Nerves: No cranial nerve deficit  Discharge instructions/Information to patient and family:(Discharge Medications and Follow up):   See after visit summary for information provided to patient and family  Provisions for Follow-Up Care:  See after visit summary for information related to follow-up care and any pertinent home health orders  Disposition: Home    Planned Readmission:  No     Discharge Statement:  I spent 40 minutes discharging the patient  This time was spent on the day of discharge  I had direct contact with the patient on the day of discharge   Greater than 50% of the total time was spent examining patient, answering all patient questions, arranging and discussing plan of care with patient as well as directly providing post-discharge instructions  Additional time then spent on discharge activities  Coordinated with nephrology and urology  Discussed with patient's son regarding discharge and follow-up plan  Discharge Medications:  See after visit summary for reconciled discharge medications provided to patient and family  ** Please Note: "This note has been constructed using a voice recognition system  Therefore there may be syntax, spelling, and/or grammatical errors   Please call if you have any questions  "**

## 2023-02-27 NOTE — NURSING NOTE
Pt discharged home, discharge instructions, including victor care and bag replacement given to pt and pts son, verbalized understanding  IV removed  Belongings taken with pt

## 2023-02-27 NOTE — PHYSICAL THERAPY NOTE
PT TREATMENT     02/27/23 1145   Note Type   Note Type Treatment   Pain Assessment   Pain Assessment Tool 0-10   Pain Score No Pain   Restrictions/Precautions   Other Precautions Fall Risk   General   Chart Reviewed Yes   Cognition   Overall Cognitive Status WFL   Arousal/Participation   (flat affect)   Subjective   Subjective "I'm going home today"   Bed Mobility   Supine to Sit 5  Supervision   Sit to Supine 5  Supervision   Transfers   Sit to Stand 4  Minimal assistance   Stand to Sit 4  Minimal assistance   Stand pivot 4  Minimal assistance   Additional items   (with walker)   Additional Comments Pt stood x 5 minutes with supervison with UE support on a walker  Ambulation/Elevation   Gait Assistance 4  Minimal assist   Assistive Device Rolling walker   Distance 50 feet, 15 feet   Balance   Static Sitting Good   Static Standing Fair +   Activity Tolerance   Activity Tolerance Patient limited by fatigue   Assessment   Prognosis Good   Problem List Decreased strength;Decreased endurance; Impaired balance;Decreased mobility   Assessment Pt demonstates improving activity tolerance overall however is not near his baseline level of function as pt still requires min assist and a walker ot ambulate 50 feet but was previously independent and working part time  Recommend STR upon DC  The patient's AM-PAC Basic Mobility Inpatient Short Form Raw Score is 20  A Raw score of greater than 16 suggests the patient may benefit from discharge to home, however pt requires min assist and a walker for transfers and ambulation and pt was previously independent, recommend STR  Plan   Treatment/Interventions LE strengthening/ROM; Functional transfer training; Therapeutic exercise;Gait training;Bed mobility; Equipment eval/education;Patient/family training; Endurance training;Elevations   Progress Progressing toward goals   PT Frequency Other (Comment)  (5x/week)   Recommendation   PT Discharge Recommendation Post acute rehabilitation services   Equipment Recommended Other (Comment)   AM-PAC Basic Mobility Inpatient   Turning in Flat Bed Without Bedrails 4   Lying on Back to Sitting on Edge of Flat Bed Without Bedrails 4   Moving Bed to Chair 3   Standing Up From Chair Using Arms 3   Walk in Room 3   Climb 3-5 Stairs With Railing 3   Basic Mobility Inpatient Raw Score 20   Basic Mobility Standardized Score 43 99   Highest Level Of Mobility   JH-HLM Goal 6: Walk 10 steps or more   JH-HLM Achieved 7: Walk 25 feet or more   End of Consult   Patient Position at End of Consult All needs within reach;Bed/Chair alarm activated;Supine  (pt declined sitting OOB)   Licensure   [place] License Number  Jhony OCHOA [id number]

## 2023-02-27 NOTE — PROGRESS NOTES
[facility] PROGRESS NOTE   Desire Marshall 78 y o  male MRN: [id number]  Unit/Bed#: [address] [place] [address] Encounter: [id number]  Reason for Consult: KARINA on CKD    ASSESSMENT and PLAN:  26-year-old male hypertension, diabetes mellitus, chronic kidney disease stage IV with baseline 2 5-3 0, ejection fraction 40% presented with outpatient finding of creatinine elevated to 4 05  Consulted for management of acute kidney injury on chronic kidney disease      Acute kidney injury  Baseline creatinine: 2 5-3 0  Admission creatinine: 3 62, current creatinine 2 82  Urinalysis: Trace blood, 1-2 RBC per high-power field  Imaging: No hydronephrosis, diffusely echogenic kidney  Acute kidney injury was thought to be due to prerenal azotemia/volume depletion in setting of poor intake and diuretic use and also due to urinary retention  Kidney function is currently improved with IV hydration and status post Dupont catheter placement on 02/23  He developed hematuria post Dupont catheter placement which is currently cleared  IV fluids have been stopped since 02/24  He has been restarted on torsemide 40 mg alternating with 20 mg  Renal function currently appears to be at baseline  Okay to discharge from nephrology perspective on torsemide regimen as above    Chronic kidney disease stage IV  Outpatient nephrologist: Dr Gagan Coleman creatinine: 2 5-3 0  Etiology: Suspected secondary to diabetic kidney disease, hypertensive nephrosclerosis and renovascular disease    Urinary retention  Status post Duopnt catheter placement status post hematuria which is clearing up  Follow-up with urology    Blood pressure  Blood pressure is currently well controlled on carvedilol 6 25 mg twice daily, Imdur 30 mg daily, and torsemide as above    Volume  History of systolic CHF with ejection fraction of 40%  Currently appears euvolemic on torsemide 40 mg alternating with 20 mg every other day    Hyponatremia  Serum sodium corrects to 132-134 for hyperglycemia (translocational hyponatremia)  Serum osmolality 306, may be high in setting of hyperglycemia  Urine osmolality 441, urine sodium 20 may be suggestive of volume overload  TSH within normal limits, uric acid 5 2  A m  cortisol 6 8, status post cosyntropin stimulation test with cortisol level increased to 30  Follow SPEP/serum free light chain ratio to rule out possibility of monoclonal gammopathy leading to pseudohyponatremia  Continue fluid restriction    Discussed with Dr Aishwarya Sahu  After discussion, we agreed that patient is ready for discharge from nephrology perspective and will be discharged on torsemide 40 mg / 20 mg and alternative days  SUBJECTIVE / 24H INTERVAL HISTORY:  Dupont catheter in place  Urine output recorded as 1800 cc  Patient denies dyspnea  He is lying flat without issues  Review of Systems   Constitutional: Negative for chills and fever  HENT: Negative for ear pain and sore throat  Eyes: Negative for pain and visual disturbance  Respiratory: Negative for cough and shortness of breath  Cardiovascular: Negative for chest pain and palpitations  Gastrointestinal: Negative for abdominal pain and vomiting  Genitourinary: Negative for dysuria and hematuria  Musculoskeletal: Negative for arthralgias and back pain  Skin: Negative for color change and rash  Neurological: Negative for seizures and syncope  All other systems reviewed and are negative      OBJECTIVE:  Current Weight: Weight - Scale: 67 1 kg (148 lb)  Vitals:    02/26/23 1528 02/26/23 1924 02/26/23 2245 02/27/23 0716   BP: 104/52 105/55 111/56 107/66   BP Location: Left arm Right arm Right arm Right arm   Pulse:  74 73 65   Resp:  16 18 18   Temp:  97 7 °F (36 5 °C) 97 9 °F (36 6 °C) 98 °F (36 7 °C)   TempSrc:  Oral Oral Oral   SpO2:  97% 98% 99%   Weight:       Height:           Intake/Output Summary (Last 24 hours) at 2/27/2023 0930  Last data filed at 2/27/2023 0510  Gross per 24 hour   Intake 580 ml   Output 1800 ml   Net -1220 ml     Physical Exam  Vitals and nursing note reviewed  Constitutional:       General: He is not in acute distress  Appearance: He is well-developed  HENT:      Head: Normocephalic and atraumatic  Eyes:      Conjunctiva/sclera: Conjunctivae normal    Cardiovascular:      Rate and Rhythm: Normal rate and regular rhythm  Pulses: Normal pulses  Heart sounds: Normal heart sounds  No murmur heard  Pulmonary:      Effort: Pulmonary effort is normal  No respiratory distress  Breath sounds: Normal breath sounds  Abdominal:      Palpations: Abdomen is soft  Tenderness: There is no abdominal tenderness  Musculoskeletal:         General: No swelling  Cervical back: Neck supple  Right lower leg: No edema  Left lower leg: No edema  Skin:     General: Skin is warm and dry  Capillary Refill: Capillary refill takes less than 2 seconds  Neurological:      Mental Status: He is alert     Psychiatric:         Mood and Affect: Mood normal          Medications:    Current Facility-Administered Medications:   •  acetaminophen (TYLENOL) tablet 650 mg, 650 mg, Oral, Q6H PRN, Hannah Osorio MD  •  allopurinol (ZYLOPRIM) tablet 100 mg, 100 mg, Oral, BID, Hannah Osorio MD, 100 mg at 02/27/23 0846  •  ALPRAZolam (XANAX) tablet 0 25 mg, 0 25 mg, Oral, HS, Hannah Osorio MD, 0 25 mg at 02/26/23 2240  •  apixaban (ELIQUIS) tablet 2 5 mg, 2 5 mg, Oral, BID, Hannah Osorio MD, 2 5 mg at 02/27/23 0847  •  ascorbic acid (VITAMIN C) tablet 500 mg, 500 mg, Oral, Daily, Hannah Osorio MD, 500 mg at 02/27/23 0847  •  atorvastatin (LIPITOR) tablet 40 mg, 40 mg, Oral, Daily With Jamal Saez MD, 40 mg at 02/26/23 1613  •  carvedilol (COREG) tablet 6 25 mg, 6 25 mg, Oral, BID With Meals, Hannah Osorio MD, 6 25 mg at 02/26/23 0807  •  cholecalciferol (VITAMIN D3) tablet 2,000 Units, 2,000 Units, Oral, Daily, Hannah Osorio MD, 2,000 Units at 02/27/23 0845  •  glimepiride (AMARYL) tablet 2 mg, 2 mg, Oral, Daily With Breakfast, Claude Dupree MD, 2 mg at 02/27/23 0740  •  glimepiride (AMARYL) tablet 2 mg, 2 mg, Oral, Daily With Naldo Reed MD  •  insulin lispro (HumaLOG) 100 units/mL subcutaneous injection 1-5 Units, 1-5 Units, Subcutaneous, TID AC, 3 Units at 02/27/23 0738 **AND** Fingerstick Glucose (POCT), , , TID AC, Claude Dupree MD  •  insulin lispro (HumaLOG) 100 units/mL subcutaneous injection 1-5 Units, 1-5 Units, Subcutaneous, HS, Claude Dupree MD, 3 Units at 02/26/23 2240  •  isosorbide mononitrate (IMDUR) 24 hr tablet 30 mg, 30 mg, Oral, Daily, Claude Dupree MD, 30 mg at 02/27/23 0847  •  latanoprost (XALATAN) 0 005 % ophthalmic solution 1 drop, 1 drop, Both Eyes, HS, Claude Dupree MD, 1 drop at 02/26/23 2240  •  sitaGLIPtin (JANUVIA) tablet 25 mg, 25 mg, Oral, Daily, Claude Dupree MD, 25 mg at 02/27/23 0844  •  timolol (TIMOPTIC) 0 5 % ophthalmic solution 1 drop, 1 drop, Both Eyes, Daily, Claude Dupree MD, 1 drop at 02/27/23 0849  •  [START ON 2/28/2023] torsemide (DEMADEX) tablet 20 mg, 20 mg, Oral, Every Other Day, Oni Mcclure MD  •  torsemide [facility]) tablet 40 mg, 40 mg, Oral, Every Other Day, Oni Mcclure MD, 40 mg at 02/27/23 0845  •  zinc sulfate (ZINCATE) capsule 220 mg, 220 mg, Oral, Daily, Claude Dupree MD, 220 mg at 02/27/23 0848    Laboratory Results:  Results from last 7 days   Lab Units 02/27/23  0455 02/26/23  0522 02/25/23  1600 02/25/23  0515 02/24/23  0456 02/23/23  0502 02/22/23  1351 02/21/23  0000   WBC Thousand/uL 6 29 5 19  --  5 90 7 45 7 85 7 47 6 60   HEMOGLOBIN g/dL 11 8* 12 1  --  11 4* 12 5 13 9 13 7 13 2   HEMATOCRIT % 34 4* 36 3*  --  34 3* 37 2 41 3 40 8 39 0   PLATELETS Thousands/uL 95* 89*  --  91* 109* 139* 111* 134*   POTASSIUM mmol/L 3 9 4 5 4 3 3 6 3 7 3 1* 3 5 3 5   CHLORIDE mmol/L 95* 95* 92* 93* 92* 89* 82* 77   CO2 mmol/L 27 31 30 32 32 35* 37* 33   BUN mg/dL 93* 84* 88* 93* 103* 112* 125* 116   CREATININE mg/dL 2 82* 2 78* 2 81* 2 89* 3 11* 3 28* 3 62* 4 05   CALCIUM mg/dL 7 5* 7 8* 8 1* 7 8* 8 2* 8 7 8 7 9 0   MAGNESIUM mg/dL  --   --   --   --   --  2 5 2 5 2 7*   PHOSPHORUS mg/dL  --   --   --   --   --  3 0 3 6 4 1       Portions of the record may have been created with voice recognition software  Occasional wrong word or "sound a like" substitutions may have occurred due to the inherent limitations of voice recognition software  Read the chart carefully and recognize, using context, where substitutions have occurred  If you have any questions, please contact the dictating provider

## 2023-02-27 NOTE — INCIDENTAL FINDINGS
The following findings require follow up:  Radiographic finding   Finding: Ultrasound of the kidney bladder revealed-  Asymmetric urinary bladder wall thickening involving the anterior, superior right aspect of the urinary bladder,    Follow up required: Yes, cystoscopy   Follow up should be done within 1 week(s)    Please notify the following clinician to assist with the follow up:   Dr Maragrito Ellis

## 2023-02-28 LAB
ACTH PLAS-MCNC: 27.4 PG/ML (ref 7.2–63.3)
ALBUMIN SERPL ELPH-MCNC: 2.93 G/DL (ref 3.5–5)
ALBUMIN SERPL ELPH-MCNC: 57.5 % (ref 52–65)
ALBUMIN UR ELPH-MCNC: 30.4 %
ALPHA1 GLOB MFR UR ELPH: 11.8 %
ALPHA1 GLOB SERPL ELPH-MCNC: 0.33 G/DL (ref 0.1–0.4)
ALPHA1 GLOB SERPL ELPH-MCNC: 6.5 % (ref 2.5–5)
ALPHA2 GLOB MFR UR ELPH: 10.8 %
ALPHA2 GLOB SERPL ELPH-MCNC: 0.54 G/DL (ref 0.4–1.2)
ALPHA2 GLOB SERPL ELPH-MCNC: 10.6 % (ref 7–13)
B-GLOBULIN MFR UR ELPH: 21.4 %
BETA GLOB ABNORMAL SERPL ELPH-MCNC: 0.26 G/DL (ref 0.4–0.8)
BETA1 GLOB SERPL ELPH-MCNC: 5 % (ref 5–13)
BETA2 GLOB SERPL ELPH-MCNC: 5.4 % (ref 2–8)
BETA2+GAMMA GLOB SERPL ELPH-MCNC: 0.28 G/DL (ref 0.2–0.5)
GAMMA GLOB ABNORMAL SERPL ELPH-MCNC: 0.77 G/DL (ref 0.5–1.6)
GAMMA GLOB MFR UR ELPH: 25.6 %
GAMMA GLOB SERPL ELPH-MCNC: 15 % (ref 12–22)
IGG/ALB SER: 1.35 {RATIO} (ref 1.1–1.8)
KAPPA LC FREE SER-MCNC: 129.3 MG/L (ref 3.3–19.4)
KAPPA LC FREE/LAMBDA FREE SER: 2.08 {RATIO} (ref 0.26–1.65)
LAMBDA LC FREE SERPL-MCNC: 62.3 MG/L (ref 5.7–26.3)
PROT PATTERN SERPL ELPH-IMP: ABNORMAL
PROT PATTERN UR ELPH-IMP: ABNORMAL
PROT SERPL-MCNC: 5.1 G/DL (ref 6.4–8.2)
PROT UR-MCNC: 34 MG/DL

## 2023-03-03 NOTE — PROGRESS NOTES
NEPHROLOGY OFFICE VISIT   Mikaela Sharif 78 y o  male MRN: [id number]  3/6/2023    Reason for Visit: Hospital follow up    INTERVAL HISTORY and SUBJECTIVE:    I had the pleasure of seeing Hema Lennon today in the renal clinic  He is a 41-year-old male who was recently hospitalized at [facility] for management of acute kidney injury on CKD  He has a past medical history of hypertension, diabetes mellitus, CKD, ejection fraction 40%  Hema Lennon was hospitalized on 2/22 and discharged on 2/27  No follow-up labs since hospital discharge    ASSESSMENT AND PLAN:    Acute kidney injury on CKD:  · Recent hospitalization creatinine 3 62 on admission  · Creatinine 2 8 on discharge  KARINA resolved  · Etiology: Likely prerenal azotemia/volume depletion in the setting of poor oral intake and diuretic use  Urinary retention contributing  · Dupont catheter placed during hospitalization  · Restarted on torsemide 40/20 mg every other day prior to discharge  · Work-up: Urinalysis showed trace blood and 1-2 RBCs  · Imaging: No hydronephrosis  Diffuse echogenicity supporting medical renal disease  · Current status: No follow-up labs since discharge  He appears euvolemic but is somewhat slow to respond and very weak  Over the last few years he has lost about 30 pounds  · Plan/recommendations:  · Instructed to go for labs as soon as possible  · Instructed to decrease torsemide to 20 mg daily and take a extra dose for edema, weight gain, swelling, shortness of breath  · After blood work discuss next steps with Hema Lennon son who was in attendance  Hema Lennon lives with his son  Chronic kidney disease, stage IV:  · Outpatient nephrologist: Dr Jose Alberto Bernardo  · Baseline creatinine: 2 5 to 3 mg/dL  · Etiology: Suspected to be secondary to diabetic kidney disease, hypertensive nephrosclerosis and renovascular disease    Urinary retention:  · Dupont catheter placed during hospitalization  · [facility] collected urine sample last Thursday or Friday    Per Amira Jin son awaiting results of culture  · Urology following    Blood pressure/volume status:  · Primary hypertension  · Volume status: Euvolemic  · Medications: Carvedilol 6 25 mg twice a day, Imdur 30 mg daily, torsemide 40 mg alternating with 20 mg every other day  · Last ECHO: EF 40-45%, Gr II DD  · Blood pressure on the low side  Blood pressure during hospitalization was running slightly higher between 100-110  · Concerned about volume depletion  Decreasing torsemide to 20 mg until blood work has been assessed  · Discussed home blood pressure monitoring and given written information    Hyponatremia:  · Etiology: Translocation due to hyperglycemia vs other vs volume overload  · Urine osmolality 306 in the setting of hyperglycemia  Sodium level corrected to 132-134  · Work-up:   · Serum osmolality 306, urine osmolality 441, urine sodium 20  · Initial cortisol level low  Cosyntropin stimulation test negative  Appropriate increase to 30 after administration of cosyntropin  · SPEP, free light chain ratio: KL ratio elevated 2 08 likely related to CKD  SPEP negative for monoclonal bands  · Follow-up sodium level essentially unchanged  · Amira Jin does not drink excessive amounts of water per his son  Anemia: Hemoglobin 11 8    Hyperglycemia: Amira Jin son is concerned due to elevated blood sugars although he is generally checking only once a day in the afternoon  Instructed him to call primary care physician for further recommendations  PATIENT INSTRUCTIONS:    Patient Instructions     You were seen today for hospital follow-up  At this time no medication changes are necessary  See plan below  Recommendations:  • Check home blood pressure    Please call if blood pressure remains consistently less than 100/70 greater than 140/80  • Continue current medications  • Blood work now and before next appointment in May  • I will call you with the results of the blood work to discuss further  • Follow up with urology for catheter  • No Motrin Aleve ibuprofen or Advil Tylenol is okay to use for control of pain  • Decrease dose of torsemide to 20 mg daily  Take an extra dose/40 mg if needed for weight gain, swelling, shortness of breath   Obtain home blood pressure readings as follows:  · In the morning please take blood pressure reading before medications  Please sit quietly at least 5 minutes before taking blood pressure reading  Make sure your arm is supported at heart level  Even if you measure your blood pressure standing up your arm should be supported at heart level  · In the evening please take blood pressure reading between 7-10 p m  prior to any evening medications and at least an hour after eating dinner  Sit quietly for at least 5 minutes  · Document readings twice a day for 1 week prior to office visit   · Document heart rate along with blood pressure reading   · General instructions:    · Make sure your sitting comfortably with back supported and both feet on the floor  Do not cross your legs  Do not talk during blood pressure measurement  Avoid coffee or caffeine at least 30 minutes prior to measurement  Do not take blood pressure measurement within 30 minutes of exercising  Do not smoke cigarettes  · If you are taking a reading while standing make sure your arm is supported at heart level and not dangling at your side    · Make sure the cuff is properly positioned above the crease at your elbow joint-   Check  guidelines  · Additional Information can be found at the following  - [employer]  - https://[employer] org/tools_downloads/self-measured-blood-pressure-video/         Orders Placed This Encounter   Procedures   • CBC     Standing Status:   Future     Standing Expiration Date:   4/6/2023   • Renal function panel     Standing Status:   Future Standing Expiration Date:   4/6/2023   • VITAMIN D, 25 HYDROXY, TOTAL     Standing Status:   Future     Standing Expiration Date:   3/6/2024       OBJECTIVE:  Current Weight: Weight - Scale: 68 2 kg (150 lb 6 4 oz)  Vitals:    03/06/23 1008 03/06/23 1019 03/06/23 1021   BP:  92/55 98/60   BP Location:  Right arm Left arm   Patient Position:  Sitting Sitting   Cuff Size:  Standard Standard   Weight: 68 2 kg (150 lb 6 4 oz)     Height: 5' 9" (1 753 m)      Body mass index is 22 21 kg/m²  REVIEW OF SYSTEMS:    Review of Systems   Constitutional: Positive for activity change and fatigue  Negative for chills, fever and unexpected weight change  Weak  Requires use of a walker  Around his place of residence  HENT: Negative for congestion and sore throat  Eyes: Negative for pain and visual disturbance  Respiratory: Negative for cough and shortness of breath  Cardiovascular: Negative for chest pain, palpitations and leg swelling  Gastrointestinal: Negative for abdominal pain, constipation, diarrhea, nausea and vomiting  Genitourinary: Dupont catheter  Urine yellow   Musculoskeletal: Positive for arthralgias and gait problem  Negative for back pain  Skin: Negative for color change, rash and wound  Neurological: Positive for weakness  Negative for dizziness, seizures, syncope and light-headedness  Hematological: Does not bruise/bleed easily  Psychiatric/Behavioral: The patient is not nervous/anxious  All other systems reviewed and are negative  PHYSICAL EXAM:      Physical Exam  Constitutional:       General: He is not in acute distress  Appearance: He is well-developed  He is ill-appearing  HENT:      Head: Normocephalic and atraumatic  Nose: No congestion  Mouth/Throat:      Mouth: Mucous membranes are moist    Eyes:      General: No scleral icterus  Right eye: No discharge  Left eye: No discharge        Extraocular Movements: Extraocular movements intact  Conjunctiva/sclera: Conjunctivae normal    Neck:      Thyroid: No thyromegaly  Vascular: No carotid bruit or JVD  Trachea: No tracheal deviation  Cardiovascular:      Rate and Rhythm: Normal rate  Rhythm irregular  Heart sounds: No murmur heard  No friction rub  No gallop  Pulmonary:      Effort: Pulmonary effort is normal  No respiratory distress  Breath sounds: Normal breath sounds  No stridor  No wheezing, rhonchi or rales  Abdominal:      General: Bowel sounds are normal  There is no distension  Palpations: Abdomen is soft  There is no mass  Tenderness: There is no abdominal tenderness  There is no guarding or rebound  Genitourinary:     Comments: Indwelling Dupont catheter  Musculoskeletal:         General: No swelling, tenderness or signs of injury  Normal range of motion  Cervical back: Normal range of motion and neck supple  No rigidity or tenderness  Right lower leg: No edema  Left lower leg: No edema  Skin:     General: Skin is warm and dry  Coloration: Skin is not jaundiced or pale  Findings: No erythema or rash  Neurological:      General: No focal deficit present  Mental Status: He is alert and oriented to person, place, and time  Psychiatric:         Mood and Affect: Mood normal          Behavior: Behavior normal          Thought Content:  Thought content normal          Judgment: Judgment normal          Medications:    Current Outpatient Medications:   •  allopurinol (ZYLOPRIM) 100 mg tablet, Take 100 mg by mouth 2 (two) times a day, Disp: , Rfl:   •  ALPRAZolam (XANAX) 0 5 mg tablet, 0 5 mg daily at bedtime , Disp: , Rfl: 0  •  ascorbic acid (VITAMIN C) 500 MG tablet, Take 1 tablet (500 mg total) by mouth daily, Disp: , Rfl: 0  •  atorvastatin (LIPITOR) 40 mg tablet, Take 1 tablet (40 mg total) by mouth daily with dinner, Disp: 30 tablet, Rfl: 0  •  carvedilol (COREG) 6 25 mg tablet, Take 1 tablet (6 25 mg total) by mouth 2 (two) times a day with meals, Disp: 60 tablet, Rfl: 0  •  cholecalciferol (VITAMIN D3) 1,000 units tablet, Take 1 tablet (1,000 Units total) by mouth daily (Patient taking differently: Take 2,000 Units by mouth daily), Disp: 60 tablet, Rfl: 3  •  Eliquis 2 5 MG, TAKE ONE TABLET BY MOUTH TWICE A DAY, Disp: 60 tablet, Rfl: 11  •  glimepiride (AMARYL) 2 mg tablet, Take 1 tablet (2 mg total) by mouth daily with dinner, Disp: 30 tablet, Rfl: 0  •  glimepiride (AMARYL) 4 mg tablet, Take 1 tablet (4 mg total) by mouth daily with breakfast, Disp: , Rfl: 0  •  isosorbide mononitrate (IMDUR) 30 mg 24 hr tablet, Take 1 tablet (30 mg total) by mouth daily, Disp: 30 tablet, Rfl: 0  •  latanoprost (XALATAN) 0 005 % ophthalmic solution, 1 drop daily at bedtime, Disp: , Rfl:   •  sitaGLIPtin (JANUVIA) 25 mg tablet, Take 1 tablet (25 mg total) by mouth daily Do not start before February 28, 2023 , Disp: 30 tablet, Rfl: 0  •  timolol (TIMOPTIC) 0 5 % ophthalmic solution, Administer 1 drop to both eyes daily, Disp: , Rfl:   •  torsemide (DEMADEX) 20 mg tablet, Take 1 tablet (20 mg total) by mouth every other day Do not start before February 28, 2023 , Disp: 30 tablet, Rfl: 0  •  torsemide 40 MG TABS, Take 40 mg by mouth every other day Do not start before March 1, 2023 , Disp: 30 tablet, Rfl: 0  •  ZINC OXIDE PO, Take by mouth daily, Disp: , Rfl:   •  Blood Pressure Monitor NILTON, by Does not apply route daily (Patient not taking: Reported on 2/22/2023), Disp: 1 Device, Rfl: 0    Laboratory Results:

## 2023-03-06 ENCOUNTER — TELEPHONE (OUTPATIENT)
Dept: NEPHROLOGY | Facility: CLINIC | Age: 83
End: 2023-03-06

## 2023-03-06 ENCOUNTER — OFFICE VISIT (OUTPATIENT)
Dept: NEPHROLOGY | Facility: CLINIC | Age: 83
End: 2023-03-06

## 2023-03-06 VITALS
HEIGHT: 69 IN | WEIGHT: 150.4 LBS | BODY MASS INDEX: 22.28 KG/M2 | SYSTOLIC BLOOD PRESSURE: 98 MMHG | DIASTOLIC BLOOD PRESSURE: 60 MMHG

## 2023-03-06 DIAGNOSIS — R80.1 PERSISTENT PROTEINURIA: ICD-10-CM

## 2023-03-06 DIAGNOSIS — N18.4 STAGE 4 CHRONIC KIDNEY DISEASE (HCC): ICD-10-CM

## 2023-03-06 DIAGNOSIS — E83.9 CHRONIC KIDNEY DISEASE-MINERAL AND BONE DISORDER: ICD-10-CM

## 2023-03-06 DIAGNOSIS — D64.9 ANEMIA, UNSPECIFIED TYPE: ICD-10-CM

## 2023-03-06 DIAGNOSIS — M89.9 CHRONIC KIDNEY DISEASE-MINERAL AND BONE DISORDER: ICD-10-CM

## 2023-03-06 DIAGNOSIS — I10 ESSENTIAL HYPERTENSION: Primary | ICD-10-CM

## 2023-03-06 DIAGNOSIS — E11.22 TYPE 2 DIABETES MELLITUS WITH STAGE 4 CHRONIC KIDNEY DISEASE AND HYPERTENSION (HCC): ICD-10-CM

## 2023-03-06 DIAGNOSIS — N18.4 BENIGN HYPERTENSION WITH CKD (CHRONIC KIDNEY DISEASE) STAGE IV (HCC): ICD-10-CM

## 2023-03-06 DIAGNOSIS — I12.9 BENIGN HYPERTENSION WITH CKD (CHRONIC KIDNEY DISEASE) STAGE IV (HCC): ICD-10-CM

## 2023-03-06 DIAGNOSIS — N18.9 ACUTE KIDNEY INJURY SUPERIMPOSED ON CHRONIC KIDNEY DISEASE (HCC): ICD-10-CM

## 2023-03-06 DIAGNOSIS — I50.42 CHRONIC COMBINED SYSTOLIC AND DIASTOLIC CONGESTIVE HEART FAILURE (HCC): ICD-10-CM

## 2023-03-06 DIAGNOSIS — N18.9 CHRONIC KIDNEY DISEASE-MINERAL AND BONE DISORDER: ICD-10-CM

## 2023-03-06 DIAGNOSIS — I12.9 TYPE 2 DIABETES MELLITUS WITH STAGE 4 CHRONIC KIDNEY DISEASE AND HYPERTENSION (HCC): ICD-10-CM

## 2023-03-06 DIAGNOSIS — E55.9 VITAMIN D DEFICIENCY: ICD-10-CM

## 2023-03-06 DIAGNOSIS — R60.0 LEG EDEMA: ICD-10-CM

## 2023-03-06 DIAGNOSIS — N17.9 ACUTE KIDNEY INJURY SUPERIMPOSED ON CHRONIC KIDNEY DISEASE (HCC): ICD-10-CM

## 2023-03-06 DIAGNOSIS — E55.9 VITAMIN D DEFICIENCY: Primary | ICD-10-CM

## 2023-03-06 DIAGNOSIS — N18.4 TYPE 2 DIABETES MELLITUS WITH STAGE 4 CHRONIC KIDNEY DISEASE AND HYPERTENSION (HCC): ICD-10-CM

## 2023-03-06 DIAGNOSIS — I48.21 PERMANENT ATRIAL FIBRILLATION (HCC): ICD-10-CM

## 2023-03-06 NOTE — PATIENT INSTRUCTIONS
You were seen today for hospital follow-up  At this time no medication changes are necessary  See plan below  Recommendations:  Check home blood pressure  Please call if blood pressure remains consistently less than 100/70 greater than 140/80  Continue current medications  Blood work now and before next appointment in May  I will call you with the results of the blood work to discuss further  Follow up with urology for catheter  No Motrin Aleve ibuprofen or Advil Tylenol is okay to use for control of pain  Decrease dose of torsemide to 20 mg daily  Take an extra dose/40 mg if needed for weight gain, swelling, shortness of breath   Obtain home blood pressure readings as follows: In the morning please take blood pressure reading before medications  Please sit quietly at least 5 minutes before taking blood pressure reading  Make sure your arm is supported at heart level  Even if you measure your blood pressure standing up your arm should be supported at heart level  In the evening please take blood pressure reading between 7-10 p m  prior to any evening medications and at least an hour after eating dinner  Sit quietly for at least 5 minutes  Document readings twice a day for 1 week prior to office visit   Document heart rate along with blood pressure reading   General instructions:    Make sure your sitting comfortably with back supported and both feet on the floor  Do not cross your legs  Do not talk during blood pressure measurement  Avoid coffee or caffeine at least 30 minutes prior to measurement  Do not take blood pressure measurement within 30 minutes of exercising  Do not smoke cigarettes  If you are taking a reading while standing make sure your arm is supported at heart level and not dangling at your side    Make sure the cuff is properly positioned above the crease at your elbow joint-   Check  guidelines  Additional Information can be found at the following  [employer]  https://[employer] org/tools_downloads/self-measured-blood-pressure-video/

## 2023-03-06 NOTE — TELEPHONE ENCOUNTER
Please place order for Vitamin D for patient to complete in May per Paul De Oliveira and mail to patient

## 2023-03-06 NOTE — TELEPHONE ENCOUNTER
Lab order mailed to patient for Vitamin D level to be done in May prior to followup visit with Dr Federica Brown per Arlet Oneal

## 2023-03-08 ENCOUNTER — DOCUMENTATION (OUTPATIENT)
Dept: NEPHROLOGY | Facility: CLINIC | Age: 83
End: 2023-03-08

## 2023-03-08 LAB
EXT GLUCOSE BLD: 234
EXTERNAL BUN: 70
EXTERNAL CALCIUM: 8.2
EXTERNAL CHLORIDE: 93
EXTERNAL CO2: 24
EXTERNAL CREATININE: 2.33
EXTERNAL EGFR: 27
EXTERNAL POTASSIUM: 4.5
EXTERNAL SODIUM: 131

## 2023-03-10 ENCOUNTER — TELEPHONE (OUTPATIENT)
Dept: NEPHROLOGY | Facility: CLINIC | Age: 83
End: 2023-03-10

## 2023-03-10 NOTE — TELEPHONE ENCOUNTER
Advised son that CR stable, back to baseline at 2 3  Sodium slightly low but acceptable  Per Bry Bobo, no changes  ----- Message from Gaurang sent at 3/10/2023 12:02 PM EST -----  Please let Antonio Keen know that I recently was sent the labs that were done on 3/7  Actually the labs are very stable  The creatinine is 2 3 which is at baseline and improved from prior level  Sodium level is a little bit low but acceptable and stable  No changes at this time  Thank you

## 2023-03-23 ENCOUNTER — OFFICE VISIT (OUTPATIENT)
Dept: CARDIOLOGY CLINIC | Facility: CLINIC | Age: 83
End: 2023-03-23

## 2023-03-23 VITALS
HEART RATE: 77 BPM | WEIGHT: 152 LBS | OXYGEN SATURATION: 98 % | SYSTOLIC BLOOD PRESSURE: 110 MMHG | DIASTOLIC BLOOD PRESSURE: 50 MMHG | HEIGHT: 69 IN | BODY MASS INDEX: 22.51 KG/M2

## 2023-03-23 DIAGNOSIS — I50.42 CHRONIC COMBINED SYSTOLIC AND DIASTOLIC CONGESTIVE HEART FAILURE (HCC): ICD-10-CM

## 2023-03-23 DIAGNOSIS — M89.9 CHRONIC KIDNEY DISEASE-MINERAL AND BONE DISORDER: ICD-10-CM

## 2023-03-23 DIAGNOSIS — E83.9 CHRONIC KIDNEY DISEASE-MINERAL AND BONE DISORDER: ICD-10-CM

## 2023-03-23 DIAGNOSIS — I10 ESSENTIAL HYPERTENSION: ICD-10-CM

## 2023-03-23 DIAGNOSIS — I48.21 PERMANENT ATRIAL FIBRILLATION (HCC): Primary | ICD-10-CM

## 2023-03-23 DIAGNOSIS — N18.9 CHRONIC KIDNEY DISEASE-MINERAL AND BONE DISORDER: ICD-10-CM

## 2023-03-23 DIAGNOSIS — I49.3 PVC (PREMATURE VENTRICULAR CONTRACTION): ICD-10-CM

## 2023-03-23 NOTE — PROGRESS NOTES
Cardiology Followup    Pablo Nevarez  [id number]  1940      Interval History: Pablo Nevarez is a 78y o  year old male who is here for followup of CHF and atrial fibrillation  In February 2023, he was admitted to [place] [address] with acute on chronic renal failure along with hyponatremia and urinary retention  He did not have any congestive heart failure during that admission  He was discharged home with torsemide 20 mg alternating with 40 mg and is following with nephrology  During their last visit, torsemide was increased to 40 mg daily  He has some LE edema and abdominal distension but denies any chest pain  He has a history of chronic combined systolic and diastolic congestive heart failure with his last ejection fraction measured of 40%  He had a stress test done which showed a fixed inferolateral wall defect without ischemia  He has a moderate-sized pericardial effusion which is chronic without tamponade  His last echocardiogram was done in April 2021  In addition, he has chronic atrial fibrillation and is on Eliquis therapy  He has had no significant bleeding in the past        Past Medical History:   Diagnosis Date   • Atrial fibrillation [facility])    • Chronic kidney disease    • Coronary artery disease    • Diabetes mellitus ([facility] )    • Hyperlipidemia    • Hypertension      Social History     Socioeconomic History   • Marital status:       Spouse name: Not on file   • Number of children: 1   • Years of education: 15   • Highest education level: 12th grade   Occupational History   • Not on file   Tobacco Use   • Smoking status: Former   • Smokeless tobacco: Former   Vaping Use   • Vaping Use: Never used   Substance and Sexual Activity   • Alcohol use: Not Currently     Alcohol/week: 0 0 standard drinks     Comment: special occasions   • Drug use: Not Currently   • Sexual activity: Not on file   Other Topics Concern   • Not on file   Social History Narrative • Not on file     Social Determinants of Health     Financial Resource Strain: Not on file   Food Insecurity: No Food Insecurity   • Worried About Running Out of Food in the Last Year: Never true   • Ran Out of Food in the Last Year: Never true   Transportation Needs: No Transportation Needs   • Lack of Transportation (Medical): No   • Lack of Transportation (Non-Medical):  No   Physical Activity: Not on file   Stress: Not on file   Social Connections: Not on file   Intimate Partner Violence: Not on file   Housing Stability: Low Risk    • Unable to Pay for Housing in the Last Year: No   • Number of Places Lived in the Last Year: 1   • Unstable Housing in the Last Year: No      Family History   Problem Relation Age of Onset   • Heart disease Mother    • Diabetes Father    • Vision loss Father    • Cancer Sister    • Diabetes Sister      Past Surgical History:   Procedure Laterality Date   • EYE SURGERY     • SKIN CANCER EXCISION     • TONSILLECTOMY         Current Outpatient Medications:   •  allopurinol (ZYLOPRIM) 100 mg tablet, Take 100 mg by mouth 2 (two) times a day, Disp: , Rfl:   •  ALPRAZolam (XANAX) 0 5 mg tablet, 0 5 mg daily at bedtime , Disp: , Rfl: 0  •  ascorbic acid (VITAMIN C) 500 MG tablet, Take 1 tablet (500 mg total) by mouth daily, Disp: , Rfl: 0  •  atorvastatin (LIPITOR) 40 mg tablet, Take 1 tablet (40 mg total) by mouth daily with dinner, Disp: 30 tablet, Rfl: 0  •  Blood Pressure Monitor NILTON, by Does not apply route daily, Disp: 1 Device, Rfl: 0  •  carvedilol (COREG) 6 25 mg tablet, Take 1 tablet (6 25 mg total) by mouth 2 (two) times a day with meals, Disp: 60 tablet, Rfl: 0  •  cholecalciferol (VITAMIN D3) 1,000 units tablet, Take 1 tablet (1,000 Units total) by mouth daily (Patient taking differently: Take 2,000 Units by mouth daily), Disp: 60 tablet, Rfl: 3  •  Eliquis 2 5 MG, TAKE ONE TABLET BY MOUTH TWICE A DAY, Disp: 60 tablet, Rfl: 11  •  glimepiride (AMARYL) 2 mg tablet, Take 1 tablet (2 mg total) by mouth daily with dinner, Disp: 30 tablet, Rfl: 0  •  glimepiride (AMARYL) 4 mg tablet, Take 1 tablet (4 mg total) by mouth daily with breakfast, Disp: , Rfl: 0  •  isosorbide mononitrate (IMDUR) 30 mg 24 hr tablet, Take 1 tablet (30 mg total) by mouth daily, Disp: 30 tablet, Rfl: 0  •  latanoprost (XALATAN) 0 005 % ophthalmic solution, 1 drop daily at bedtime, Disp: , Rfl:   •  sitaGLIPtin (JANUVIA) 25 mg tablet, Take 1 tablet (25 mg total) by mouth daily Do not start before February 28, 2023 , Disp: 30 tablet, Rfl: 0  •  timolol (TIMOPTIC) 0 5 % ophthalmic solution, Administer 1 drop to both eyes daily, Disp: , Rfl:   •  Torsemide 40 MG TABS, Take 40 mg by mouth in the morning, Disp: 30 tablet, Rfl: 0  •  ZINC OXIDE PO, Take by mouth daily, Disp: , Rfl:   Allergies   Allergen Reactions   • Elemental Sulfur    • Sulfa Antibiotics          Review of Systems:  Review of Systems   Respiratory: Negative for shortness of breath  Cardiovascular: Positive for leg swelling  Negative for chest pain and palpitations  Genitourinary: Positive for difficulty urinating  Musculoskeletal: Positive for arthralgias and myalgias  All other systems reviewed and are negative  Physical Exam:  Vitals:    03/23/23 1413   BP: 110/50   BP Location: Left arm   Patient Position: Sitting   Cuff Size: Standard   Pulse: 77   SpO2: 98%   Weight: 68 9 kg (152 lb)   Height: 5' 9" (1 753 m)     Physical Exam  Constitutional:       General: He is not in acute distress  Appearance: He is well-developed  He is not diaphoretic  HENT:      Head: Normocephalic and atraumatic  Eyes:      Conjunctiva/sclera: Conjunctivae normal       Pupils: Pupils are equal, round, and reactive to light  Neck:      Thyroid: No thyromegaly  Vascular: No JVD  Cardiovascular:      Rate and Rhythm: Normal rate  Rhythm irregular  Heart sounds: Normal heart sounds  No murmur heard  No friction rub  No gallop  Pulmonary:      Effort: Pulmonary effort is normal       Breath sounds: Normal breath sounds  Abdominal:      General: There is distension  Palpations: Abdomen is soft  Tenderness: There is no abdominal tenderness  Musculoskeletal:         General: No tenderness or deformity  Cervical back: Neck supple  Right lower leg: Edema present  Left lower leg: Edema present  Skin:     General: Skin is warm and dry  Findings: No erythema or rash  Neurological:      General: No focal deficit present  Mental Status: He is alert  Mental status is at baseline  Cranial Nerves: No cranial nerve deficit  Psychiatric:         Mood and Affect: Mood normal          Behavior: Behavior normal          Thought Content: Thought content normal          Judgment: Judgment normal          Labs: reviewed    Imaging: No results found  ECG: Atrial fibrillation at 80 bpm    Discussion/Summary:  1  Permanent atrial fibrillation ([address] )    2  Chronic combined systolic and diastolic congestive heart failure ([address] )    3  Essential hypertension    4  PVC (premature ventricular contraction)    5  Chronic kidney disease-mineral and bone disorder      - Continue torsemide 40 mg daily  He is following closely with nephrology  - Monitor daily weights  - Continue Eliquis 2 5 mg twice a day  - Following up with nephrology for renal failure  - continue carvedilol 6 25 mg b i d

## 2023-05-11 ENCOUNTER — OFFICE VISIT (OUTPATIENT)
Dept: NEPHROLOGY | Facility: CLINIC | Age: 83
End: 2023-05-11

## 2023-05-11 VITALS
DIASTOLIC BLOOD PRESSURE: 72 MMHG | BODY MASS INDEX: 21.77 KG/M2 | SYSTOLIC BLOOD PRESSURE: 134 MMHG | WEIGHT: 147 LBS | HEIGHT: 69 IN | HEART RATE: 84 BPM

## 2023-05-11 DIAGNOSIS — E11.22 TYPE 2 DIABETES MELLITUS WITH STAGE 4 CHRONIC KIDNEY DISEASE AND HYPERTENSION (HCC): ICD-10-CM

## 2023-05-11 DIAGNOSIS — E87.1 HYPONATREMIA: ICD-10-CM

## 2023-05-11 DIAGNOSIS — I50.42 CHRONIC COMBINED SYSTOLIC AND DIASTOLIC CONGESTIVE HEART FAILURE (HCC): Primary | ICD-10-CM

## 2023-05-11 DIAGNOSIS — R80.1 PERSISTENT PROTEINURIA: ICD-10-CM

## 2023-05-11 DIAGNOSIS — N18.4 BENIGN HYPERTENSION WITH CKD (CHRONIC KIDNEY DISEASE) STAGE IV (HCC): ICD-10-CM

## 2023-05-11 DIAGNOSIS — I12.9 BENIGN HYPERTENSION WITH CKD (CHRONIC KIDNEY DISEASE) STAGE IV (HCC): ICD-10-CM

## 2023-05-11 DIAGNOSIS — E83.9 CHRONIC KIDNEY DISEASE-MINERAL AND BONE DISORDER: ICD-10-CM

## 2023-05-11 DIAGNOSIS — M89.9 CHRONIC KIDNEY DISEASE-MINERAL AND BONE DISORDER: ICD-10-CM

## 2023-05-11 DIAGNOSIS — N18.4 TYPE 2 DIABETES MELLITUS WITH STAGE 4 CHRONIC KIDNEY DISEASE AND HYPERTENSION (HCC): ICD-10-CM

## 2023-05-11 DIAGNOSIS — N18.9 CHRONIC KIDNEY DISEASE-MINERAL AND BONE DISORDER: ICD-10-CM

## 2023-05-11 DIAGNOSIS — R33.9 URINARY RETENTION: ICD-10-CM

## 2023-05-11 DIAGNOSIS — I12.9 TYPE 2 DIABETES MELLITUS WITH STAGE 4 CHRONIC KIDNEY DISEASE AND HYPERTENSION (HCC): ICD-10-CM

## 2023-05-11 DIAGNOSIS — N18.4 STAGE 4 CHRONIC KIDNEY DISEASE (HCC): ICD-10-CM

## 2023-05-11 PROBLEM — N17.9 ACUTE KIDNEY INJURY SUPERIMPOSED ON CHRONIC KIDNEY DISEASE (HCC): Status: RESOLVED | Noted: 2019-04-05 | Resolved: 2023-05-11

## 2023-05-11 RX ORDER — TAMSULOSIN HYDROCHLORIDE 0.4 MG/1
0.4 CAPSULE ORAL
COMMUNITY

## 2023-05-11 NOTE — PATIENT INSTRUCTIONS
1  )  Low 2 g sodium diet    2 )  Monitor weights at home    3 )  Avoid NSAIDs (ibuprofen, Motrin, Advil, Aleve, naproxen)    4 )  Monitor blood pressure at home, call if blood pressure greater than 150/90 persistently    5 ) I will plan to discuss all results including blood work, and/or imaging at our next visit, unless there is an urgent indication, in which case I will call you earlier  If you have any questions or concerns about your results, please feel free to call our office      6

## 2023-05-11 NOTE — PROGRESS NOTES
NEPHROLOGY OFFICE VISIT   Robinson Farr 78 y o  male MRN: [id number]  5/11/2023    Reason for Visit: Chronic kidney disease    History of Present Illness (HPI):    I had the pleasure of seeing Tameka Joyce and his son today in the renal clinic for the continued management of chronic kidney disease  Since our last visit, there has been no ER visits or hospitilizations  He currently has no complaints at this time and is feeling well  Patient denies any chest pain, shortness of breath and swelling  The last blood work was done on March, which we have reviewed together  No recent blood work for this visit  His last blood work from March which included a CMP and CBC showed a creatinine of 2 3 mg/dL which is below his usual baseline  This could be a reflection of loss of muscle mass due to poor intake  He is eating less now  Following with the dietitian  His weights have been stable and the swelling has been stable as well  No nausea no vomiting no chest pain or shortness of breath  He is maintained on diuretics  His left leg seems to be more swollen than the right leg  He is maintained on anticoagulation  His sodium level is also low but stable at his usual baseline  His serum glucose levels do fluctuate and they are being managed by his primary care physician  No hypoglycemic episodes      ASSESSMENT and PLAN:    1 ) Chronic kidney disease stage IV  -presumed etiology is most likely diabetic nephropathy, with possible component of hypertensive nephrosclerosis and arterial sclerosis   May even have a component of renal vascular disease  -Screen for albumin creatinine ratio  -baseline creatinine 2 5-3 mg/dL, currently below baseline, could be reflection of loss of muscle mass  -Dialysis modalities in the past have been discussed at length  -Chronic Kidney Disease education/Kidney Smart:  Attended  -no urgent indication for dialysis at this time  -avoid NSAIDs  -No recent renal imaging and no clinical indication to repeat at this time  -diabetic and blood pressure control  -Last creatinine from March was 2 3 mg/dL and stable  -no overt uremic symptoms no urgent indication for renal replacement therapy  -Check repeat blood work now  -Continue diuretics  -Follow-up in 3 to 4 months     2  ) Chronic systolic congestive heart failure  -ejection fraction 40%  -follows with Cardiology- Dr Zulma Rios  -premature ventricular contraction, pericardial effusion, was moderate on the last echocardiogram  -EDW around high 140s lbs, close to his dry weight  -Torsemide 40 mg daily  -daily weights  -low 2 g sodium diet     3  ) Hypertension  -blood pressure at goal in the office  -aim for less than 130/80  -Torsemide 40 mg daily, carvedilol 6 25 mg twice a day  -low 2 g sodium diet  -blood pressure at target     4 )  Diabetes mellitus type 2  -hemoglobin A1c: 7 1 (A1C values may be falsely elevated or decreased in those with CKD, assay method should be certified by [employer])  Target A1C < 7  -current medications: Glimepiride and sitagliptin  -proteinuria:  Yes, screen for microalbumin/creatinine ratio  -retinopathy:  No  -neuropathy:  No  -please follow with an ophthalmologist and podiatrist every year  -continued self monitoring of blood glucose at home  -Treatment Management in CKD Recommendations:   • Metformin:  Avoid if creatinine clearance is less than 30 cc/min (concern for lactic acidosis)  • Sodium-Glucose Cotransporter-2 (SGLT2) Inhibitors:  May see an acute drop in the eGFR initially when starting the medication but then a stabilization of the renal function, with slower loss of renal function as compared to placebo   Relative risk of end-stage renal disease, doubling of serum creatinine or death from renal causes were also found to be lower as compared to placebo  ([facility])    Would recommend starting at 100 mg daily, I would avoid use in patients with eGFR < 30 cc/min   If no contraindications exist I would recommend this medication added to the diabetic regiment  • Sulfonylureas:  Preferred short-acting (glipizide, glimepiride, repaglinide), relatively safe in patients with non dialysis CKD  • Thiazolidinedones/Alpha-Gluosidase inhibitors/Dipeptidyl peptidase-4 inhibitors:  Generally not considered first-line agents in CKD (limited data in long-term safety and efficacy)  • Insulin:  Starting dose may need to be lower than what would ordinarily be used, as there is a decrease metabolism of insulin (no dose adjustment if the GFR > 50 mL/min, dose should be reduced to 75% of baseline if GFR 10-50 mL/min, and 50% baseline if GFR < 10 mL/min)    5 )  Urinary retention  -- No urinary symptoms at this time voiding well    6 ) Hyponatremia  -- Low but stable  -- Serum osmolality elevated at the last check 306 with a urine osmolality of 441 and a urine sodium of 20  -- Initial cortisol level was low but the cosyntropin stimulation test was negative  -- Serum protein electrophoresis showed no monoclonal bands  -- Fluid restriction and continue loop diuretic      PATIENT INSTRUCTIONS:    Patient Instructions   1 )  Low 2 g sodium diet    2 )  Monitor weights at home    3 )  Avoid NSAIDs (ibuprofen, Motrin, Advil, Aleve, naproxen)    4 )  Monitor blood pressure at home, call if blood pressure greater than 150/90 persistently    5 ) I will plan to discuss all results including blood work, and/or imaging at our next visit, unless there is an urgent indication, in which case I will call you earlier  If you have any questions or concerns about your results, please feel free to call our office  6          Orders Placed This Encounter   Procedures   • Cystatin C With eGFR     Standing Status:   Future     Standing Expiration Date:   5/11/2024   • Comprehensive metabolic panel     This is a patient instruction: Patient fasting for 8 hours or longer recommended       Standing Status:   Future     Number of Occurrences: "1     Standing Expiration Date:   5/11/2024   • Cystatin C With eGFR     Standing Status:   Future     Standing Expiration Date:   5/11/2024   • Albumin / creatinine urine ratio     Standing Status:   Future     Number of Occurrences:   1     Standing Expiration Date:   5/11/2024   • CBC     Standing Status:   Future     Number of Occurrences:   1     Standing Expiration Date:   5/11/2024   • PTH, intact     Standing Status:   Future     Number of Occurrences:   1     Standing Expiration Date:   5/11/2024   • Phosphorus     Standing Status:   Future     Number of Occurrences:   1     Standing Expiration Date:   5/11/2024   • PTH, intact     Standing Status:   Future     Number of Occurrences:   1     Standing Expiration Date:   5/11/2024   • Phosphorus     Standing Status:   Future     Number of Occurrences:   1     Standing Expiration Date:   5/11/2024   • Comprehensive metabolic panel     This is a patient instruction: Patient fasting for 8 hours or longer recommended  Standing Status:   Future     Number of Occurrences:   1     Standing Expiration Date:   5/11/2024   • CBC     Standing Status:   Future     Number of Occurrences:   1     Standing Expiration Date:   5/11/2024       OBJECTIVE:  Current Weight: Weight - Scale: 66 7 kg (147 lb)  Vitals:    05/11/23 1512   BP: 134/72   BP Location: Left arm   Patient Position: Sitting   Cuff Size: Standard   Pulse: 84   Weight: 66 7 kg (147 lb)   Height: 5' 9\" (1 753 m)    Body mass index is 21 71 kg/m²  REVIEW OF SYSTEMS:    Review of Systems   Constitutional: Negative for activity change and fever  Respiratory: Negative for cough, chest tightness, shortness of breath and wheezing  Cardiovascular: Negative for chest pain and leg swelling  Gastrointestinal: Negative for abdominal pain, diarrhea, nausea and vomiting  Endocrine: Negative for polyuria  Genitourinary: Negative for difficulty urinating, dysuria, flank pain, frequency and urgency     Skin: " Negative for rash  Neurological: Negative for dizziness, syncope, light-headedness and headaches  All other systems reviewed and are negative  PHYSICAL EXAM:      Physical Exam  Vitals and nursing note reviewed  Exam conducted with a chaperone present  Constitutional:       General: He is not in acute distress  Appearance: He is well-developed  He is ill-appearing  HENT:      Head: Normocephalic and atraumatic  Eyes:      General: No scleral icterus  Conjunctiva/sclera: Conjunctivae normal       Pupils: Pupils are equal, round, and reactive to light  Cardiovascular:      Rate and Rhythm: Normal rate and regular rhythm  Heart sounds: S1 normal and S2 normal  No murmur heard  No friction rub  No gallop  Pulmonary:      Effort: Pulmonary effort is normal  No respiratory distress  Breath sounds: Normal breath sounds  No wheezing or rales  Abdominal:      General: Bowel sounds are normal       Palpations: Abdomen is soft  Tenderness: There is no abdominal tenderness  There is no rebound  Musculoskeletal:         General: Normal range of motion  Cervical back: Normal range of motion and neck supple  Left lower leg: Edema present  Skin:     Findings: No rash  Neurological:      Mental Status: He is alert and oriented to person, place, and time     Psychiatric:         Behavior: Behavior normal          Medications:    Current Outpatient Medications:   •  allopurinol (ZYLOPRIM) 100 mg tablet, Take 100 mg by mouth 2 (two) times a day, Disp: , Rfl:   •  ALPRAZolam (XANAX) 0 5 mg tablet, 0 5 mg daily at bedtime , Disp: , Rfl: 0  •  ascorbic acid (VITAMIN C) 500 MG tablet, Take 1 tablet (500 mg total) by mouth daily, Disp: , Rfl: 0  •  atorvastatin (LIPITOR) 40 mg tablet, Take 1 tablet (40 mg total) by mouth daily with dinner, Disp: 30 tablet, Rfl: 0  •  carvedilol (COREG) 6 25 mg tablet, Take 1 tablet (6 25 mg total) by mouth 2 (two) times a day with meals, Disp: 60 tablet, Rfl: 0  •  cholecalciferol (VITAMIN D3) 1,000 units tablet, Take 1 tablet (1,000 Units total) by mouth daily (Patient taking differently: Take 2,000 Units by mouth daily), Disp: 60 tablet, Rfl: 3  •  Eliquis 2 5 MG, TAKE ONE TABLET BY MOUTH TWICE A DAY, Disp: 60 tablet, Rfl: 11  •  glimepiride (AMARYL) 2 mg tablet, Take 1 tablet (2 mg total) by mouth daily with dinner, Disp: 30 tablet, Rfl: 0  •  glimepiride (AMARYL) 4 mg tablet, Take 1 tablet (4 mg total) by mouth daily with breakfast, Disp: , Rfl: 0  •  isosorbide mononitrate (IMDUR) 30 mg 24 hr tablet, Take 1 tablet (30 mg total) by mouth daily, Disp: 30 tablet, Rfl: 0  •  latanoprost (XALATAN) 0 005 % ophthalmic solution, 1 drop daily at bedtime, Disp: , Rfl:   •  sitaGLIPtin (JANUVIA) 25 mg tablet, Take 1 tablet (25 mg total) by mouth daily Do not start before February 28, 2023 , Disp: 30 tablet, Rfl: 0  •  tamsulosin (FLOMAX) 0 4 mg, Take 0 4 mg by mouth daily with dinner, Disp: , Rfl:   •  timolol (TIMOPTIC) 0 5 % ophthalmic solution, Administer 1 drop to both eyes daily, Disp: , Rfl:   •  Torsemide 40 MG TABS, Take 40 mg by mouth in the morning, Disp: 30 tablet, Rfl: 0  •  ZINC OXIDE PO, Take by mouth daily, Disp: , Rfl:   •  Blood Pressure Monitor NILTON, by Does not apply route daily, Disp: 1 Device, Rfl: 0    Laboratory Results:        Invalid input(s): ALBUMIN    Results for orders placed or performed in visit on 24/30/02   Basic metabolic panel   Result Value Ref Range    SODIUM 131     POTASSIUM 4 5     CHLORIDE 93     CO2 24     BUN 70     CREATININE 2 33     Glucose 234     EXTERNAL CALCIUM 8 2     EXTERNAL EGFR 27

## 2023-05-12 ENCOUNTER — APPOINTMENT (OUTPATIENT)
Dept: LAB | Facility: CLINIC | Age: 83
End: 2023-05-12

## 2023-05-12 DIAGNOSIS — I12.9 TYPE 2 DIABETES MELLITUS WITH STAGE 4 CHRONIC KIDNEY DISEASE AND HYPERTENSION (HCC): ICD-10-CM

## 2023-05-12 DIAGNOSIS — I12.9 BENIGN HYPERTENSION WITH CKD (CHRONIC KIDNEY DISEASE) STAGE IV (HCC): ICD-10-CM

## 2023-05-12 DIAGNOSIS — M89.9 CHRONIC KIDNEY DISEASE-MINERAL AND BONE DISORDER: ICD-10-CM

## 2023-05-12 DIAGNOSIS — I50.42 CHRONIC COMBINED SYSTOLIC AND DIASTOLIC CONGESTIVE HEART FAILURE (HCC): ICD-10-CM

## 2023-05-12 DIAGNOSIS — N18.9 CHRONIC KIDNEY DISEASE-MINERAL AND BONE DISORDER: ICD-10-CM

## 2023-05-12 DIAGNOSIS — R33.9 URINARY RETENTION: ICD-10-CM

## 2023-05-12 DIAGNOSIS — N18.4 TYPE 2 DIABETES MELLITUS WITH STAGE 4 CHRONIC KIDNEY DISEASE AND HYPERTENSION (HCC): ICD-10-CM

## 2023-05-12 DIAGNOSIS — E83.9 CHRONIC KIDNEY DISEASE-MINERAL AND BONE DISORDER: ICD-10-CM

## 2023-05-12 DIAGNOSIS — E87.1 HYPONATREMIA: ICD-10-CM

## 2023-05-12 DIAGNOSIS — R80.1 PERSISTENT PROTEINURIA: ICD-10-CM

## 2023-05-12 DIAGNOSIS — E11.22 TYPE 2 DIABETES MELLITUS WITH STAGE 4 CHRONIC KIDNEY DISEASE AND HYPERTENSION (HCC): ICD-10-CM

## 2023-05-12 DIAGNOSIS — N18.4 BENIGN HYPERTENSION WITH CKD (CHRONIC KIDNEY DISEASE) STAGE IV (HCC): ICD-10-CM

## 2023-05-12 LAB
ALBUMIN SERPL BCP-MCNC: 3 G/DL (ref 3.5–5)
ALP SERPL-CCNC: 154 U/L (ref 46–116)
ALT SERPL W P-5'-P-CCNC: 23 U/L (ref 12–78)
ANION GAP SERPL CALCULATED.3IONS-SCNC: 4 MMOL/L (ref 4–13)
AST SERPL W P-5'-P-CCNC: 17 U/L (ref 5–45)
BILIRUB SERPL-MCNC: 0.79 MG/DL (ref 0.2–1)
BUN SERPL-MCNC: 53 MG/DL (ref 5–25)
CALCIUM ALBUM COR SERPL-MCNC: 9.6 MG/DL (ref 8.3–10.1)
CALCIUM SERPL-MCNC: 8.8 MG/DL (ref 8.3–10.1)
CHLORIDE SERPL-SCNC: 102 MMOL/L (ref 96–108)
CO2 SERPL-SCNC: 29 MMOL/L (ref 21–32)
CREAT SERPL-MCNC: 2.18 MG/DL (ref 0.6–1.3)
ERYTHROCYTE [DISTWIDTH] IN BLOOD BY AUTOMATED COUNT: 16.6 % (ref 11.6–15.1)
GFR SERPL CREATININE-BSD FRML MDRD: 26 ML/MIN/1.73SQ M
GLUCOSE SERPL-MCNC: 160 MG/DL (ref 65–140)
HCT VFR BLD AUTO: 32.6 % (ref 36.5–49.3)
HGB BLD-MCNC: 10.9 G/DL (ref 12–17)
MCH RBC QN AUTO: 34.9 PG (ref 26.8–34.3)
MCHC RBC AUTO-ENTMCNC: 33.4 G/DL (ref 31.4–37.4)
MCV RBC AUTO: 105 FL (ref 82–98)
PHOSPHATE SERPL-MCNC: 2.5 MG/DL (ref 2.3–4.1)
PLATELET # BLD AUTO: 123 THOUSANDS/UL (ref 149–390)
PMV BLD AUTO: 10.7 FL (ref 8.9–12.7)
POTASSIUM SERPL-SCNC: 4.1 MMOL/L (ref 3.5–5.3)
PROT SERPL-MCNC: 6.4 G/DL (ref 6.4–8.4)
PTH-INTACT SERPL-MCNC: 147.4 PG/ML (ref 18.4–80.1)
RBC # BLD AUTO: 3.12 MILLION/UL (ref 3.88–5.62)
SODIUM SERPL-SCNC: 135 MMOL/L (ref 135–147)
WBC # BLD AUTO: 3.97 THOUSAND/UL (ref 4.31–10.16)

## 2023-05-16 LAB
CYSTATIN C SERPL-MCNC: 2.57 MG/L (ref 0.87–1.12)
GFR/BSA.PRED SERPLBLD CYS-BASED-ARV: 20 ML/MIN/1.73

## 2023-06-23 ENCOUNTER — HOSPITAL ENCOUNTER (INPATIENT)
Facility: HOSPITAL | Age: 83
LOS: 5 days | DRG: 871 | End: 2023-06-28
Attending: EMERGENCY MEDICINE | Admitting: INTERNAL MEDICINE
Payer: MEDICARE

## 2023-06-23 ENCOUNTER — APPOINTMENT (INPATIENT)
Dept: RADIOLOGY | Facility: HOSPITAL | Age: 83
DRG: 871 | End: 2023-06-23
Payer: MEDICARE

## 2023-06-23 ENCOUNTER — APPOINTMENT (EMERGENCY)
Dept: RADIOLOGY | Facility: HOSPITAL | Age: 83
DRG: 871 | End: 2023-06-23
Payer: MEDICARE

## 2023-06-23 DIAGNOSIS — A41.9 SEPTIC SHOCK (HCC): ICD-10-CM

## 2023-06-23 DIAGNOSIS — I31.39 PERICARDIAL EFFUSION: ICD-10-CM

## 2023-06-23 DIAGNOSIS — R57.9 SHOCK (HCC): Primary | ICD-10-CM

## 2023-06-23 DIAGNOSIS — N19 RENAL FAILURE: ICD-10-CM

## 2023-06-23 DIAGNOSIS — R53.1 WEAKNESS: ICD-10-CM

## 2023-06-23 DIAGNOSIS — R65.21 SEPTIC SHOCK (HCC): ICD-10-CM

## 2023-06-23 DIAGNOSIS — J18.1 RIGHT LOWER LOBE CONSOLIDATION (HCC): ICD-10-CM

## 2023-06-23 DIAGNOSIS — J86.9 EMPYEMA OF LUNG (HCC): ICD-10-CM

## 2023-06-23 DIAGNOSIS — D69.6 THROMBOCYTOPENIA (HCC): ICD-10-CM

## 2023-06-23 LAB
ALBUMIN SERPL BCP-MCNC: 3.2 G/DL (ref 3.5–5)
ALP SERPL-CCNC: 122 U/L (ref 34–104)
ALT SERPL W P-5'-P-CCNC: 25 U/L (ref 7–52)
ANION GAP SERPL CALCULATED.3IONS-SCNC: 11 MMOL/L
AST SERPL W P-5'-P-CCNC: 21 U/L (ref 13–39)
BASOPHILS # BLD MANUAL: 0 THOUSAND/UL (ref 0–0.1)
BASOPHILS NFR MAR MANUAL: 0 % (ref 0–1)
BILIRUB SERPL-MCNC: 0.62 MG/DL (ref 0.2–1)
BNP SERPL-MCNC: 141 PG/ML (ref 0–100)
BUN SERPL-MCNC: 100 MG/DL (ref 5–25)
CALCIUM ALBUM COR SERPL-MCNC: 8 MG/DL (ref 8.3–10.1)
CALCIUM SERPL-MCNC: 7.4 MG/DL (ref 8.4–10.2)
CARDIAC TROPONIN I PNL SERPL HS: 6 NG/L
CHLORIDE SERPL-SCNC: 93 MMOL/L (ref 96–108)
CO2 SERPL-SCNC: 23 MMOL/L (ref 21–32)
CREAT SERPL-MCNC: 3.72 MG/DL (ref 0.6–1.3)
EOSINOPHIL # BLD MANUAL: 0.04 THOUSAND/UL (ref 0–0.4)
EOSINOPHIL NFR BLD MANUAL: 1 % (ref 0–6)
ERYTHROCYTE [DISTWIDTH] IN BLOOD BY AUTOMATED COUNT: 15.3 % (ref 11.6–15.1)
FLUAV RNA RESP QL NAA+PROBE: NEGATIVE
FLUBV RNA RESP QL NAA+PROBE: NEGATIVE
GFR SERPL CREATININE-BSD FRML MDRD: 14 ML/MIN/1.73SQ M
GLUCOSE SERPL-MCNC: 202 MG/DL (ref 65–140)
GLUCOSE SERPL-MCNC: 249 MG/DL (ref 65–140)
HCT VFR BLD AUTO: 32.5 % (ref 36.5–49.3)
HGB BLD-MCNC: 10.7 G/DL (ref 12–17)
LACTATE SERPL-SCNC: 1.7 MMOL/L (ref 0.5–2)
LG PLATELETS BLD QL SMEAR: PRESENT
LYMPHOCYTES # BLD AUTO: 0.87 THOUSAND/UL (ref 0.6–4.47)
LYMPHOCYTES # BLD AUTO: 24 % (ref 14–44)
MAGNESIUM SERPL-MCNC: 2.5 MG/DL (ref 1.9–2.7)
MCH RBC QN AUTO: 34.6 PG (ref 26.8–34.3)
MCHC RBC AUTO-ENTMCNC: 32.9 G/DL (ref 31.4–37.4)
MCV RBC AUTO: 105 FL (ref 82–98)
MONOCYTES # BLD AUTO: 0.4 THOUSAND/UL (ref 0–1.22)
MONOCYTES NFR BLD: 11 % (ref 4–12)
NEUTROPHILS # BLD MANUAL: 2.33 THOUSAND/UL (ref 1.85–7.62)
NEUTS BAND NFR BLD MANUAL: 13 % (ref 0–8)
NEUTS SEG NFR BLD AUTO: 51 % (ref 43–75)
PLATELET # BLD AUTO: 66 THOUSANDS/UL (ref 149–390)
PLATELET BLD QL SMEAR: ABNORMAL
PMV BLD AUTO: 11 FL (ref 8.9–12.7)
POTASSIUM SERPL-SCNC: 4.5 MMOL/L (ref 3.5–5.3)
PROT SERPL-MCNC: 5.8 G/DL (ref 6.4–8.4)
RBC # BLD AUTO: 3.09 MILLION/UL (ref 3.88–5.62)
RBC MORPH BLD: NORMAL
RSV RNA RESP QL NAA+PROBE: NEGATIVE
SARS-COV-2 RNA RESP QL NAA+PROBE: NEGATIVE
SODIUM SERPL-SCNC: 127 MMOL/L (ref 135–147)
WBC # BLD AUTO: 3.64 THOUSAND/UL (ref 4.31–10.16)

## 2023-06-23 PROCEDURE — 36415 COLL VENOUS BLD VENIPUNCTURE: CPT | Performed by: EMERGENCY MEDICINE

## 2023-06-23 PROCEDURE — 85007 BL SMEAR W/DIFF WBC COUNT: CPT | Performed by: EMERGENCY MEDICINE

## 2023-06-23 PROCEDURE — 83735 ASSAY OF MAGNESIUM: CPT | Performed by: EMERGENCY MEDICINE

## 2023-06-23 PROCEDURE — 83880 ASSAY OF NATRIURETIC PEPTIDE: CPT | Performed by: EMERGENCY MEDICINE

## 2023-06-23 PROCEDURE — 87154 CUL TYP ID BLD PTHGN 6+ TRGT: CPT | Performed by: EMERGENCY MEDICINE

## 2023-06-23 PROCEDURE — 82948 REAGENT STRIP/BLOOD GLUCOSE: CPT

## 2023-06-23 PROCEDURE — 71250 CT THORAX DX C-: CPT

## 2023-06-23 PROCEDURE — 87077 CULTURE AEROBIC IDENTIFY: CPT | Performed by: EMERGENCY MEDICINE

## 2023-06-23 PROCEDURE — 84484 ASSAY OF TROPONIN QUANT: CPT | Performed by: EMERGENCY MEDICINE

## 2023-06-23 PROCEDURE — 96367 TX/PROPH/DG ADDL SEQ IV INF: CPT

## 2023-06-23 PROCEDURE — G1004 CDSM NDSC: HCPCS

## 2023-06-23 PROCEDURE — 99285 EMERGENCY DEPT VISIT HI MDM: CPT

## 2023-06-23 PROCEDURE — 83930 ASSAY OF BLOOD OSMOLALITY: CPT | Performed by: NURSE PRACTITIONER

## 2023-06-23 PROCEDURE — 87040 BLOOD CULTURE FOR BACTERIA: CPT | Performed by: EMERGENCY MEDICINE

## 2023-06-23 PROCEDURE — 80053 COMPREHEN METABOLIC PANEL: CPT | Performed by: EMERGENCY MEDICINE

## 2023-06-23 PROCEDURE — 93005 ELECTROCARDIOGRAM TRACING: CPT

## 2023-06-23 PROCEDURE — 71045 X-RAY EXAM CHEST 1 VIEW: CPT

## 2023-06-23 PROCEDURE — 74176 CT ABD & PELVIS W/O CONTRAST: CPT

## 2023-06-23 PROCEDURE — 96365 THER/PROPH/DIAG IV INF INIT: CPT

## 2023-06-23 PROCEDURE — 0241U HB NFCT DS VIR RESP RNA 4 TRGT: CPT | Performed by: EMERGENCY MEDICINE

## 2023-06-23 PROCEDURE — 85027 COMPLETE CBC AUTOMATED: CPT | Performed by: EMERGENCY MEDICINE

## 2023-06-23 PROCEDURE — 83605 ASSAY OF LACTIC ACID: CPT | Performed by: EMERGENCY MEDICINE

## 2023-06-23 RX ORDER — CEFEPIME HYDROCHLORIDE 2 G/50ML
2000 INJECTION, SOLUTION INTRAVENOUS ONCE
Status: COMPLETED | OUTPATIENT
Start: 2023-06-23 | End: 2023-06-23

## 2023-06-23 RX ADMIN — CEFEPIME HYDROCHLORIDE 2000 MG: 2 INJECTION, SOLUTION INTRAVENOUS at 22:16

## 2023-06-23 RX ADMIN — SODIUM CHLORIDE 1000 ML: 0.9 INJECTION, SOLUTION INTRAVENOUS at 22:00

## 2023-06-23 RX ADMIN — SODIUM CHLORIDE 1000 ML: 0.9 INJECTION, SOLUTION INTRAVENOUS at 22:10

## 2023-06-23 RX ADMIN — SODIUM CHLORIDE 1000 ML: 0.9 INJECTION, SOLUTION INTRAVENOUS at 22:14

## 2023-06-23 RX ADMIN — NOREPINEPHRINE BITARTRATE 3 MCG/MIN: 1 SOLUTION INTRAVENOUS at 22:49

## 2023-06-24 ENCOUNTER — APPOINTMENT (INPATIENT)
Dept: NON INVASIVE DIAGNOSTICS | Facility: HOSPITAL | Age: 83
DRG: 871 | End: 2023-06-24
Payer: MEDICARE

## 2023-06-24 PROBLEM — A41.9 SEPTIC SHOCK (HCC): Status: ACTIVE | Noted: 2023-06-23

## 2023-06-24 PROBLEM — J18.9 RIGHT LOWER LOBE PNEUMONIA: Status: ACTIVE | Noted: 2023-06-24

## 2023-06-24 PROBLEM — N17.9 ACUTE KIDNEY INJURY SUPERIMPOSED ON CHRONIC KIDNEY DISEASE (HCC): Status: ACTIVE | Noted: 2022-11-07

## 2023-06-24 PROBLEM — J18.1 RIGHT LOWER LOBE CONSOLIDATION (HCC): Status: ACTIVE | Noted: 2023-06-24

## 2023-06-24 PROBLEM — R65.21 SEPTIC SHOCK (HCC): Status: ACTIVE | Noted: 2023-06-23

## 2023-06-24 PROBLEM — D69.6 THROMBOCYTOPENIA (HCC): Status: ACTIVE | Noted: 2023-06-24

## 2023-06-24 PROBLEM — N18.9 ACUTE KIDNEY INJURY SUPERIMPOSED ON CHRONIC KIDNEY DISEASE (HCC): Status: ACTIVE | Noted: 2022-11-07

## 2023-06-24 PROBLEM — T68.XXXA HYPOTHERMIA: Status: ACTIVE | Noted: 2023-06-24

## 2023-06-24 PROBLEM — R19.7 DIARRHEA: Status: ACTIVE | Noted: 2023-06-24

## 2023-06-24 LAB
2HR DELTA HS TROPONIN: 0 NG/L
ABO GROUP BLD: NORMAL
ABO GROUP BLD: NORMAL
ALBUMIN SERPL BCP-MCNC: 2.8 G/DL (ref 3.5–5)
ALP SERPL-CCNC: 113 U/L (ref 34–104)
ALT SERPL W P-5'-P-CCNC: 22 U/L (ref 7–52)
ANION GAP SERPL CALCULATED.3IONS-SCNC: 4 MMOL/L
ANION GAP SERPL CALCULATED.3IONS-SCNC: 7 MMOL/L
AORTIC ROOT: 3.8 CM
APICAL FOUR CHAMBER EJECTION FRACTION: 45 %
APPEARANCE FLD: CLEAR
AST SERPL W P-5'-P-CCNC: 18 U/L (ref 13–39)
BILIRUB SERPL-MCNC: 0.54 MG/DL (ref 0.2–1)
BILIRUB UR QL STRIP: NEGATIVE
BLD GP AB SCN SERPL QL: NEGATIVE
BUN SERPL-MCNC: 85 MG/DL (ref 5–25)
BUN SERPL-MCNC: 89 MG/DL (ref 5–25)
CALCIUM ALBUM COR SERPL-MCNC: 8.1 MG/DL (ref 8.3–10.1)
CALCIUM SERPL-MCNC: 6.8 MG/DL (ref 8.4–10.2)
CALCIUM SERPL-MCNC: 7.1 MG/DL (ref 8.4–10.2)
CARDIAC TROPONIN I PNL SERPL HS: 6 NG/L
CHLORIDE SERPL-SCNC: 100 MMOL/L (ref 96–108)
CHLORIDE SERPL-SCNC: 99 MMOL/L (ref 96–108)
CLARITY UR: NORMAL
CO2 SERPL-SCNC: 23 MMOL/L (ref 21–32)
CO2 SERPL-SCNC: 23 MMOL/L (ref 21–32)
COLOR FLD: YELLOW
COLOR UR: NORMAL
CREAT SERPL-MCNC: 3.54 MG/DL (ref 0.6–1.3)
CREAT SERPL-MCNC: 3.57 MG/DL (ref 0.6–1.3)
CREAT UR-MCNC: 75.3 MG/DL
E WAVE DECELERATION TIME: 118 MS
ERYTHROCYTE [DISTWIDTH] IN BLOOD BY AUTOMATED COUNT: 15 % (ref 11.6–15.1)
FIBRINOGEN PPP-MCNC: 375 MG/DL (ref 227–495)
FRACTIONAL SHORTENING: 21 (ref 28–44)
GFR SERPL CREATININE-BSD FRML MDRD: 14 ML/MIN/1.73SQ M
GFR SERPL CREATININE-BSD FRML MDRD: 14 ML/MIN/1.73SQ M
GLUCOSE SERPL-MCNC: 114 MG/DL (ref 65–140)
GLUCOSE SERPL-MCNC: 127 MG/DL (ref 65–140)
GLUCOSE SERPL-MCNC: 194 MG/DL (ref 65–140)
GLUCOSE SERPL-MCNC: 229 MG/DL (ref 65–140)
GLUCOSE SERPL-MCNC: 241 MG/DL (ref 65–140)
GLUCOSE SERPL-MCNC: 284 MG/DL (ref 65–140)
GLUCOSE SERPL-MCNC: 38 MG/DL (ref 65–140)
GLUCOSE SERPL-MCNC: 39 MG/DL (ref 65–140)
GLUCOSE SERPL-MCNC: 47 MG/DL (ref 65–140)
GLUCOSE SERPL-MCNC: 48 MG/DL (ref 65–140)
GLUCOSE SERPL-MCNC: 69 MG/DL (ref 65–140)
GLUCOSE SERPL-MCNC: 71 MG/DL (ref 65–140)
GLUCOSE SERPL-MCNC: 95 MG/DL (ref 65–140)
GLUCOSE UR STRIP-MCNC: NEGATIVE MG/DL
HCT VFR BLD AUTO: 30.4 % (ref 36.5–49.3)
HGB BLD-MCNC: 10 G/DL (ref 12–17)
HGB UR QL STRIP.AUTO: NEGATIVE
INR PPP: 2.15 (ref 0.84–1.19)
INTERVENTRICULAR SEPTUM IN DIASTOLE (PARASTERNAL SHORT AXIS VIEW): 1.2 CM
INTERVENTRICULAR SEPTUM: 1.2 CM (ref 0.6–1.1)
KETONES UR STRIP-MCNC: NEGATIVE MG/DL
L PNEUMO1 AG UR QL IA.RAPID: NEGATIVE
LAAS-AP2: 31.6 CM2
LAAS-AP4: 26.9 CM2
LDH SERPL-CCNC: 111 U/L (ref 140–271)
LDH SERPL-CCNC: 99 U/L (ref 140–271)
LEFT ATRIUM SIZE: 4.8 CM
LEFT ATRIUM VOLUME (MOD BIPLANE): 109 ML
LEFT INTERNAL DIMENSION IN SYSTOLE: 3 CM (ref 2.1–4)
LEFT VENTRICULAR INTERNAL DIMENSION IN DIASTOLE: 3.8 CM (ref 3.5–6)
LEFT VENTRICULAR POSTERIOR WALL IN END DIASTOLE: 1.1 CM
LEFT VENTRICULAR STROKE VOLUME: 26 ML
LEUKOCYTE ESTERASE UR QL STRIP: NEGATIVE
LVSV (TEICH): 26 ML
LYMPHOCYTES NFR BLD AUTO: 78 %
MAGNESIUM SERPL-MCNC: 2.2 MG/DL (ref 1.9–2.7)
MCH RBC QN AUTO: 34.5 PG (ref 26.8–34.3)
MCHC RBC AUTO-ENTMCNC: 32.9 G/DL (ref 31.4–37.4)
MCV RBC AUTO: 105 FL (ref 82–98)
MITRAL REGURGITATION PEAK VELOCITY: 3.99 M/S
MITRAL VALVE MEAN INFLOW VELOCITY: 3.52 M/S
MITRAL VALVE REGURGITANT PEAK GRADIENT: 64 MMHG
MONOCYTES NFR BLD AUTO: 16 %
MV E'TISSUE VEL-LAT: 6 CM/S
MV E'TISSUE VEL-SEP: 7 CM/S
MV PEAK A VEL: 0.4 M/S
MV PEAK E VEL: 117 CM/S
MV STENOSIS PRESSURE HALF TIME: 34 MS
MV VALVE AREA P 1/2 METHOD: 6.47
NEUTS SEG NFR BLD AUTO: 6 %
NITRITE UR QL STRIP: NEGATIVE
NRBC BLD AUTO-RTO: 1 /100 WBCS
OSMOLALITY UR/SERPL-RTO: 306 MMOL/KG (ref 282–298)
OSMOLALITY UR: 329 MMOL/KG
PH UR STRIP.AUTO: 5.5 [PH]
PHOSPHATE SERPL-MCNC: 4.3 MG/DL (ref 2.3–4.1)
PLATELET # BLD AUTO: 58 THOUSANDS/UL (ref 149–390)
PMV BLD AUTO: 10.8 FL (ref 8.9–12.7)
POTASSIUM SERPL-SCNC: 3.7 MMOL/L (ref 3.5–5.3)
POTASSIUM SERPL-SCNC: 4.2 MMOL/L (ref 3.5–5.3)
PROT SERPL-MCNC: 4.8 G/DL (ref 6.4–8.4)
PROT UR STRIP-MCNC: NEGATIVE MG/DL
PROTHROMBIN TIME: 24.1 SECONDS (ref 11.6–14.5)
RBC # BLD AUTO: 2.9 MILLION/UL (ref 3.88–5.62)
RH BLD: NEGATIVE
RH BLD: NEGATIVE
RIGHT ATRIUM AREA SYSTOLE A4C: 14.3 CM2
RIGHT VENTRICLE ID DIMENSION: 3.4 CM
S PNEUM AG UR QL: NEGATIVE
SITE: NORMAL
SL CV DOP CALC MV VTI RETROGRADE: 136.7 CM
SL CV LEFT ATRIUM LENGTH A2C: 6.3 CM
SL CV LV EF: 45
SL CV MV MEAN GRADIENT RETROGRADE: 52 MMHG
SL CV PED ECHO LEFT VENTRICLE DIASTOLIC VOLUME (MOD BIPLANE) 2D: 61 ML
SL CV PED ECHO LEFT VENTRICLE SYSTOLIC VOLUME (MOD BIPLANE) 2D: 35 ML
SODIUM 24H UR-SCNC: 22 MOL/L
SODIUM SERPL-SCNC: 127 MMOL/L (ref 135–147)
SODIUM SERPL-SCNC: 129 MMOL/L (ref 135–147)
SP GR UR STRIP.AUTO: 1.01 (ref 1–1.03)
SPECIMEN EXPIRATION DATE: NORMAL
TOTAL CELLS COUNTED SPEC: 100
TR MAX PG: 41 MMHG
TR PEAK VELOCITY: 3.2 M/S
TRICUSPID VALVE PEAK REGURGITATION VELOCITY: 3.19 M/S
UROBILINOGEN UR QL STRIP.AUTO: 0.2 E.U./DL
WBC # BLD AUTO: 3.01 THOUSAND/UL (ref 4.31–10.16)
WBC # FLD MANUAL: 15 /UL

## 2023-06-24 PROCEDURE — 32557 INSERT CATH PLEURA W/ IMAGE: CPT

## 2023-06-24 PROCEDURE — 93308 TTE F-UP OR LMTD: CPT | Performed by: INTERNAL MEDICINE

## 2023-06-24 PROCEDURE — 85384 FIBRINOGEN ACTIVITY: CPT | Performed by: NURSE PRACTITIONER

## 2023-06-24 PROCEDURE — 86850 RBC ANTIBODY SCREEN: CPT | Performed by: NURSE PRACTITIONER

## 2023-06-24 PROCEDURE — 32557 INSERT CATH PLEURA W/ IMAGE: CPT | Performed by: RADIOLOGY

## 2023-06-24 PROCEDURE — 83615 LACTATE (LD) (LDH) ENZYME: CPT | Performed by: INTERNAL MEDICINE

## 2023-06-24 PROCEDURE — 88305 TISSUE EXAM BY PATHOLOGIST: CPT | Performed by: PATHOLOGY

## 2023-06-24 PROCEDURE — 89051 BODY FLUID CELL COUNT: CPT | Performed by: NURSE PRACTITIONER

## 2023-06-24 PROCEDURE — 99152 MOD SED SAME PHYS/QHP 5/>YRS: CPT | Performed by: RADIOLOGY

## 2023-06-24 PROCEDURE — 82948 REAGENT STRIP/BLOOD GLUCOSE: CPT

## 2023-06-24 PROCEDURE — 87205 SMEAR GRAM STAIN: CPT | Performed by: NURSE PRACTITIONER

## 2023-06-24 PROCEDURE — 83010 ASSAY OF HAPTOGLOBIN QUANT: CPT | Performed by: NURSE PRACTITIONER

## 2023-06-24 PROCEDURE — C1729 CATH, DRAINAGE: HCPCS

## 2023-06-24 PROCEDURE — 93308 TTE F-UP OR LMTD: CPT

## 2023-06-24 PROCEDURE — 83735 ASSAY OF MAGNESIUM: CPT | Performed by: NURSE PRACTITIONER

## 2023-06-24 PROCEDURE — 87070 CULTURE OTHR SPECIMN AEROBIC: CPT | Performed by: NURSE PRACTITIONER

## 2023-06-24 PROCEDURE — 81003 URINALYSIS AUTO W/O SCOPE: CPT | Performed by: NURSE PRACTITIONER

## 2023-06-24 PROCEDURE — 84100 ASSAY OF PHOSPHORUS: CPT | Performed by: NURSE PRACTITIONER

## 2023-06-24 PROCEDURE — 82947 ASSAY GLUCOSE BLOOD QUANT: CPT | Performed by: INTERNAL MEDICINE

## 2023-06-24 PROCEDURE — 84300 ASSAY OF URINE SODIUM: CPT | Performed by: NURSE PRACTITIONER

## 2023-06-24 PROCEDURE — 99291 CRITICAL CARE FIRST HOUR: CPT | Performed by: NURSE PRACTITIONER

## 2023-06-24 PROCEDURE — P9017 PLASMA 1 DONOR FRZ W/IN 8 HR: HCPCS

## 2023-06-24 PROCEDURE — NC001 PR NO CHARGE: Performed by: RADIOLOGY

## 2023-06-24 PROCEDURE — 86901 BLOOD TYPING SEROLOGIC RH(D): CPT | Performed by: NURSE PRACTITIONER

## 2023-06-24 PROCEDURE — 88112 CYTOPATH CELL ENHANCE TECH: CPT | Performed by: PATHOLOGY

## 2023-06-24 PROCEDURE — 93321 DOPPLER ECHO F-UP/LMTD STD: CPT | Performed by: INTERNAL MEDICINE

## 2023-06-24 PROCEDURE — 85027 COMPLETE CBC AUTOMATED: CPT | Performed by: NURSE PRACTITIONER

## 2023-06-24 PROCEDURE — 83935 ASSAY OF URINE OSMOLALITY: CPT | Performed by: NURSE PRACTITIONER

## 2023-06-24 PROCEDURE — 93325 DOPPLER ECHO COLOR FLOW MAPG: CPT | Performed by: INTERNAL MEDICINE

## 2023-06-24 PROCEDURE — 87081 CULTURE SCREEN ONLY: CPT | Performed by: INTERNAL MEDICINE

## 2023-06-24 PROCEDURE — 0W9930Z DRAINAGE OF RIGHT PLEURAL CAVITY WITH DRAINAGE DEVICE, PERCUTANEOUS APPROACH: ICD-10-PCS | Performed by: RADIOLOGY

## 2023-06-24 PROCEDURE — 85610 PROTHROMBIN TIME: CPT | Performed by: NURSE PRACTITIONER

## 2023-06-24 PROCEDURE — 80048 BASIC METABOLIC PNL TOTAL CA: CPT | Performed by: NURSE PRACTITIONER

## 2023-06-24 PROCEDURE — 86900 BLOOD TYPING SEROLOGIC ABO: CPT | Performed by: NURSE PRACTITIONER

## 2023-06-24 PROCEDURE — 83986 ASSAY PH BODY FLUID NOS: CPT | Performed by: NURSE PRACTITIONER

## 2023-06-24 PROCEDURE — 82570 ASSAY OF URINE CREATININE: CPT | Performed by: NURSE PRACTITIONER

## 2023-06-24 PROCEDURE — C1769 GUIDE WIRE: HCPCS

## 2023-06-24 PROCEDURE — 30233L1 TRANSFUSION OF NONAUTOLOGOUS FRESH PLASMA INTO PERIPHERAL VEIN, PERCUTANEOUS APPROACH: ICD-10-PCS | Performed by: INTERNAL MEDICINE

## 2023-06-24 PROCEDURE — 87449 NOS EACH ORGANISM AG IA: CPT | Performed by: NURSE PRACTITIONER

## 2023-06-24 PROCEDURE — 83615 LACTATE (LD) (LDH) ENZYME: CPT | Performed by: NURSE PRACTITIONER

## 2023-06-24 PROCEDURE — 80053 COMPREHEN METABOLIC PANEL: CPT | Performed by: NURSE PRACTITIONER

## 2023-06-24 RX ORDER — LIDOCAINE HYDROCHLORIDE 20 MG/ML
1 JELLY TOPICAL ONCE
Status: COMPLETED | OUTPATIENT
Start: 2023-06-24 | End: 2023-06-24

## 2023-06-24 RX ORDER — MELATONIN
2000 DAILY
Status: DISCONTINUED | OUTPATIENT
Start: 2023-06-24 | End: 2023-06-28 | Stop reason: HOSPADM

## 2023-06-24 RX ORDER — CHLORHEXIDINE GLUCONATE 0.12 MG/ML
15 RINSE ORAL EVERY 12 HOURS SCHEDULED
Status: DISCONTINUED | OUTPATIENT
Start: 2023-06-24 | End: 2023-06-28 | Stop reason: HOSPADM

## 2023-06-24 RX ORDER — ASCORBIC ACID 500 MG
500 TABLET ORAL DAILY
Status: DISCONTINUED | OUTPATIENT
Start: 2023-06-24 | End: 2023-06-28 | Stop reason: HOSPADM

## 2023-06-24 RX ORDER — TIMOLOL MALEATE 5 MG/ML
1 SOLUTION/ DROPS OPHTHALMIC DAILY
Status: DISCONTINUED | OUTPATIENT
Start: 2023-06-24 | End: 2023-06-28 | Stop reason: HOSPADM

## 2023-06-24 RX ORDER — DEXTROSE MONOHYDRATE 25 G/50ML
25 INJECTION, SOLUTION INTRAVENOUS ONCE
Status: COMPLETED | OUTPATIENT
Start: 2023-06-24 | End: 2023-06-24

## 2023-06-24 RX ORDER — TAMSULOSIN HYDROCHLORIDE 0.4 MG/1
0.4 CAPSULE ORAL
Status: DISCONTINUED | OUTPATIENT
Start: 2023-06-24 | End: 2023-06-28 | Stop reason: HOSPADM

## 2023-06-24 RX ORDER — INSULIN LISPRO 100 [IU]/ML
1-6 INJECTION, SOLUTION INTRAVENOUS; SUBCUTANEOUS EVERY 6 HOURS SCHEDULED
Status: DISCONTINUED | OUTPATIENT
Start: 2023-06-24 | End: 2023-06-26

## 2023-06-24 RX ORDER — DEXTROSE MONOHYDRATE 25 G/50ML
50 INJECTION, SOLUTION INTRAVENOUS ONCE
Status: COMPLETED | OUTPATIENT
Start: 2023-06-24 | End: 2023-06-24

## 2023-06-24 RX ORDER — ATORVASTATIN CALCIUM 40 MG/1
40 TABLET, FILM COATED ORAL
Status: DISCONTINUED | OUTPATIENT
Start: 2023-06-24 | End: 2023-06-28 | Stop reason: HOSPADM

## 2023-06-24 RX ORDER — LATANOPROST 50 UG/ML
1 SOLUTION/ DROPS OPHTHALMIC
Status: DISCONTINUED | OUTPATIENT
Start: 2023-06-24 | End: 2023-06-28 | Stop reason: HOSPADM

## 2023-06-24 RX ORDER — CALCIUM GLUCONATE 20 MG/ML
2 INJECTION, SOLUTION INTRAVENOUS ONCE
Status: COMPLETED | OUTPATIENT
Start: 2023-06-24 | End: 2023-06-25

## 2023-06-24 RX ORDER — CEFEPIME HYDROCHLORIDE 1 G/50ML
1000 INJECTION, SOLUTION INTRAVENOUS EVERY 12 HOURS
Status: DISCONTINUED | OUTPATIENT
Start: 2023-06-24 | End: 2023-06-28 | Stop reason: HOSPADM

## 2023-06-24 RX ADMIN — TIMOLOL MALEATE 1 DROP: 5 SOLUTION/ DROPS OPHTHALMIC at 09:05

## 2023-06-24 RX ADMIN — CEFEPIME HYDROCHLORIDE 1000 MG: 1 INJECTION, SOLUTION INTRAVENOUS at 09:07

## 2023-06-24 RX ADMIN — CHLORHEXIDINE GLUCONATE 0.12% ORAL RINSE 15 ML: 1.2 LIQUID ORAL at 09:05

## 2023-06-24 RX ADMIN — DEXTROSE MONOHYDRATE 25 G: 25 INJECTION, SOLUTION INTRAVENOUS at 23:21

## 2023-06-24 RX ADMIN — LIDOCAINE HYDROCHLORIDE 1 APPLICATION: 20 JELLY TOPICAL at 01:00

## 2023-06-24 RX ADMIN — CEFEPIME HYDROCHLORIDE 1000 MG: 1 INJECTION, SOLUTION INTRAVENOUS at 21:16

## 2023-06-24 RX ADMIN — PHYTONADIONE 10 MG: 10 INJECTION, EMULSION INTRAMUSCULAR; INTRAVENOUS; SUBCUTANEOUS at 10:23

## 2023-06-24 RX ADMIN — LATANOPROST 1 DROP: 50 SOLUTION OPHTHALMIC at 21:16

## 2023-06-24 RX ADMIN — CHLORHEXIDINE GLUCONATE 0.12% ORAL RINSE 15 ML: 1.2 LIQUID ORAL at 21:16

## 2023-06-24 RX ADMIN — Medication 2000 UNITS: at 11:00

## 2023-06-24 RX ADMIN — NOREPINEPHRINE BITARTRATE 5 MCG/MIN: 1 SOLUTION INTRAVENOUS at 17:55

## 2023-06-24 RX ADMIN — CALCIUM GLUCONATE 2 G: 20 INJECTION, SOLUTION INTRAVENOUS at 23:14

## 2023-06-24 RX ADMIN — INSULIN LISPRO 2 UNITS: 100 INJECTION, SOLUTION INTRAVENOUS; SUBCUTANEOUS at 06:27

## 2023-06-24 RX ADMIN — CHLORHEXIDINE GLUCONATE 0.12% ORAL RINSE 15 ML: 1.2 LIQUID ORAL at 02:01

## 2023-06-24 RX ADMIN — DEXTROSE MONOHYDRATE 50 ML: 25 INJECTION, SOLUTION INTRAVENOUS at 15:58

## 2023-06-24 NOTE — H&P
[facility] [address]  H&P  Name: Gigi Gee 78 y o  male I MRN: [id number]  Unit/Bed#: ICU 04 I Date of Admission: 6/23/2023   Date of Service: 6/24/2023 I Hospital Day: 1      Assessment/Plan   Septic shock [facility])  Assessment & Plan  Sepsis criteria met on admission as evidence by hypothermia (91 degrees), leukopenia, tachypnea and hypotension  · ED course: given 2L crystalloid, cefepime  Started on levophed 3mcg/min  Yola hugger applied   · Imaging:   · CXR concerning for RLL consolidation  · CT C/A/P pending   · BC pending  · UA pending  · LA 1 7    Plan:  · Continue levophed, wean for goal MAP >65  · Follow culture data   · Send C  Diff/ stool O&P given diarrhea   · Check strep pneumo/legionalla urine antigens  · Continue cefepime  · Trend WBC, fever curve     Atrial fibrillation with slow ventricular response (HCC)  Assessment & Plan  · Chronic Afib, HR currently 40-60s on arrival  · On dose reduced eliquis of 2 5mg BID, hold for now pending pericardial fluid assessment   · Slow ventricular response secondary to hypothermia vs possible BRASH syndrome  · Hold coreg  · Slowly rewarm with Yola hugger  · Closely monitor HR on telemetry  · Correct electrolytes    Hypothermia  Assessment & Plan  · Temp of 91 degrees on presentation   · Hypothermia likely secondary to sepsis  · yola hugger applied  · Continue to slowly rewarm  · Closely trend vitals and electrolytes    Acute kidney injury superimposed on chronic kidney disease [facility])  Assessment & Plan  Lab Results   Component Value Date    EGFR 14 06/23/2023    EGFR 20 (L) 05/12/2023    EGFR 26 05/12/2023    CREATININE 3 72 (H) 06/23/2023    CREATININE 2 18 (H) 05/12/2023    CREATININE 2 33 03/07/2023     · CKD IV with baseline creatinine 2 5 - 3 0 on outpatient Nephrology notes  · Presents with creatinine of 3 7 from recent 2 18 (1 month ago)  · KARINA on CKD ?  pre-renal secondary to diarrhea, consideration for component of obstructive nephropathy, vs cardiorenal   · Insert victor  · Urine lytes pending   · Hold home torsemide  · Continue flomax  · Close I&Os  · Daily weights    Diarrhea  Assessment & Plan  · Presents with 4 days of diarrhea  Patient's son denies fever/chills, nausea or vomiting  · Check c   Diff, stool O&P  · Monitor stool output      Chronic combined systolic and diastolic congestive heart failure (HCC)  Assessment & Plan  Wt Readings from Last 3 Encounters:   06/23/23 72 kg (158 lb 11 7 oz)   05/11/23 66 7 kg (147 lb)   03/23/23 68 9 kg (152 lb)       · Echo 4/1/23 with EF 40-45%, grade II diastolic dysfunction, moderate TR, moderate chronic pericardial effusion   · Weight is up 6KG from previous (1 month ago)  ·   · Hold further fluid administration  · Hold diuretics for now but consider tomorrow pending urine output, renal function   · Follow up limited echo   · Close I&Os  · Daily weights      Hyponatremia  Assessment & Plan  · Presented with a sodium of 127   · Unclear fluid status, patient with diarrhea x 4 days so concern for hypovolemia however, pedal edema present on physical exam with 6K weight gain   · Given 2L of NSS in the ED    Plan:   · Trend BMPs Q12  · Check serum osmo, urine studies   · Hold home torsemide  · Close I&Os  · Daily weights     Right lower lobe consolidation (HCC)  Assessment & Plan  · Concern for right lower lobe pneumonia vs effusion   · Patient is tachypneic but not in distress, currently saturating well on room air  · Continue cefepime  · Doubtful that we will be able to obtain a sputum cx at this time  · Check strep pneumo/legionella urine antigens   · Pulmonary hygiene     Thrombocytopenia (HCC)  Assessment & Plan  · Acute on chronic thrombocytopenia  · Platelets 72R on admission   · Recent baseline 90-120s  · Continue to trend platelets      Pericardial effusion  Assessment & Plan  · Echo in February with moderate pericardial effusion that appeared to be chronic in nature  · CT chest with evidence of effusion on admission  · Limited echo pending, monitor for signs/symptoms of pericardial tamponade   · Hold home eliquis until assessment of pericardial fluid     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 ([facility] [address] )  Assessment & Plan  Lab Results   Component Value Date    HGBA1C 8 3 (H) 02/23/2023       Recent Labs     06/23/23  2211   POCGLU 249*       Blood Sugar Average: Last 72 hrs:  (P) 249     · PTA meds: amaryl, januvia  · Hold home meds  · Start SSI   · Goal BG <180    Essential hypertension  Assessment & Plan  · PTA meds: coreg, imdur   · Hold home antihypertensive medications for now in the setting of hypotension          History of Present Illness     HPI: Iesha Campbell is a 78 y  o  with a past medical history of CKD IV, HTN, combined heart failure, and DM2 who presents with approximately four days of diarrhea  Gerhardt Griffes lives with his son and has visiting nurses  Today, his visiting nurse recommended that he seek further evaluation  The patient's son is at the bedside and provides all history  Per patient's son, he has had no evidence of fever, chills, nausea, or vomiting  He does report poor appetite  In the ER, he met sepsis criteria as evidence by hypothermia (temp 91), tachypnea, leukopenia and hypotension  He was given 2L of crystalloid and cefepime per sepsis protocol  He was then started on levophed to maintain MAP >65  Ruben hugger applied to facilitate rewarming  Labwork was remarkable for creatinine 3 7 (baseline 2 5-3 0), WBC of 3, platelets 66, sodium 127  CXR with right lower lobe consolidation  CT C/A/P pending  Will admit to ICU for further workup and hemodynamic monitoring  Of note, patient's son reports that he has never fully returned to his baseline following admission in February of this year  He is sometimes able to ambulate with a walker  Other times he is too weak  He can answer simple questions but is not conversationional at baseline   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  His is a   Code status reviewed and he is full code  History obtained from child and chart review  Review of Systems   Respiratory: Negative for shortness of breath  Cardiovascular: Positive for leg swelling  Negative for chest pain  Gastrointestinal: Positive for diarrhea  Negative for abdominal pain, nausea and vomiting          Historical Information   Past Medical History:  No date: Atrial fibrillation ([place] [address] )  No date: Chronic kidney disease  No date: Coronary artery disease  No date: Diabetes mellitus ([place] [address] )  No date: Hyperlipidemia  No date: Hypertension Past Surgical History:  No date: EYE SURGERY  No date: SKIN CANCER EXCISION  No date: TONSILLECTOMY   Current Outpatient Medications   Medication Instructions   • allopurinol (ZYLOPRIM) 100 mg, Oral, 2 times daily   • ALPRAZolam (XANAX) 0 5 mg, Daily at bedtime   • ascorbic acid (VITAMIN C) 500 mg, Oral, Daily   • atorvastatin (LIPITOR) 40 mg, Oral, Daily with dinner   • Blood Pressure Monitor NILTON Does not apply, Daily   • carvedilol (COREG) 6 25 mg, Oral, 2 times daily with meals   • cholecalciferol (VITAMIN D3) 1,000 Units, Oral, Daily   • Eliquis 2 5 MG TAKE ONE TABLET BY MOUTH TWICE A DAY   • glimepiride (AMARYL) 4 mg, Oral, Daily with breakfast   • glimepiride (AMARYL) 2 mg, Oral, Daily with dinner   • isosorbide mononitrate (IMDUR) 30 mg, Oral, Daily   • latanoprost (XALATAN) 0 005 % ophthalmic solution 1 drop, Daily at bedtime   • sitaGLIPtin (JANUVIA) 25 mg, Oral, Daily   • tamsulosin (FLOMAX) 0 4 mg, Oral, Daily with dinner   • timolol (TIMOPTIC) 0 5 % ophthalmic solution 1 drop, Both Eyes, Daily   • Torsemide 40 mg, Oral, Daily   • ZINC OXIDE PO Oral, Daily    Allergies   Allergen Reactions   • Elemental Sulfur    • Sulfa Antibiotics       Social History     Tobacco Use   • Smoking status: Former   • Smokeless tobacco: Former   Vaping Use   • Vaping Use: Never used   Substance Use Topics   • Alcohol use: Not Currently     Alcohol/week: 0 0 standard drinks of alcohol     Comment: special occasions   • Drug use: Not Currently    Family History   Problem Relation Age of Onset   • Heart disease Mother    • Diabetes Father    • Vision loss Father    • Cancer Sister    • Diabetes Sister           Objective                            Vitals I/O      Most Recent Min/Max in 24hrs   Temp (!) 91 3 °F (32 9 °C) Temp  Min: 91 3 °F (32 9 °C)  Max: 91 3 °F (32 9 °C)   Pulse (!) 53 Pulse  Min: 51  Max: 65   Resp (!) 26 Resp  Min: 15  Max: 26   /54 BP  Min: 76/50  Max: 118/54   O2 Sat 98 % SpO2  Min: 97 %  Max: 100 %      Intake/Output Summary (Last 24 hours) at 6/24/2023 0128  Last data filed at 6/23/2023 2249  Gross per 24 hour   Intake 1050 ml   Output --   Net 1050 ml         Diet NPO; Sips with meds     Invasive Monitoring Physical exam    Physical Exam  Constitutional:       General: He is sleeping  He is not in acute distress  Appearance: He is ill-appearing  He is not diaphoretic  HENT:      Head: Normocephalic and atraumatic  Mouth/Throat:      Mouth: Mucous membranes are dry  Pharynx: Oropharynx is clear  Eyes:      Extraocular Movements: Extraocular movements intact  Pupils: Pupils are equal, round, and reactive to light  Cardiovascular:      Rate and Rhythm: Bradycardia present  Rhythm irregular  Pulses:           Radial pulses are 1+ on the right side and 1+ on the left side  Dorsalis pedis pulses are 1+ on the right side and 1+ on the left side  Heart sounds: Heart sounds are distant  Pulmonary:      Effort: Tachypnea present  No accessory muscle usage  Breath sounds: Examination of the right-middle field reveals decreased breath sounds and rales  Examination of the right-lower field reveals decreased breath sounds and rales  Examination of the left-lower field reveals decreased breath sounds  Decreased breath sounds and rales present  No wheezing or rhonchi  Abdominal:      General: Bowel sounds are normal  There is distension  Tenderness: There is no abdominal tenderness  There is no guarding  Musculoskeletal:      Right lower leg: Edema present  Left lower leg: Edema present  Comments: +3 pedal edema   Skin:     General: Skin is warm and dry  Capillary Refill: Capillary refill takes less than 2 seconds  Comments: B/l erythematous feet, chronic appearing PVD color changes on lower extremities with small areas of scabbing/blisters   Neurological:      Mental Status: He is easily aroused  GCS: GCS eye subscore is 3  GCS verbal subscore is 5  GCS motor subscore is 6  Comments: Only answers in one word answers          Diagnostic Studies      EKG: Afib with slow ventricular response  Imaging:  I have personally reviewed pertinent reports     and I have personally reviewed pertinent films in PACS     Medications:  Scheduled PRN   ascorbic acid, 500 mg, Daily  atorvastatin, 40 mg, Daily With Dinner  cefepime, 1,000 mg, Q12H  chlorhexidine, 15 mL, Q12H [address]  cholecalciferol, 2,000 Units, Daily  insulin lispro, 1-6 Units, Q6H [address]  latanoprost, 1 drop, HS  lidocaine, 1 Application, Once  tamsulosin, 0 4 mg, Daily With Dinner  timolol, 1 drop, Daily          Continuous    norepinephrine, 1-30 mcg/min         Labs:    CBC    Recent Labs     06/23/23 2153   WBC 3 64*   HGB 10 7*   HCT 32 5*   PLT 66*   BANDSPCT 13*     BMP    Recent Labs     06/23/23 2153   SODIUM 127*   K 4 5   CL 93*   CO2 23   AGAP 11   *   CREATININE 3 72*   CALCIUM 7 4*       Coags    No recent results     Additional Electrolytes  Recent Labs     06/23/23  2153   MG 2 5          Blood Gas    No recent results  No recent results LFTs  Recent Labs     06/23/23 2153   ALT 25   AST 21   ALKPHOS 122*   ALB 3 2*   TBILI 0 62       Infectious  No recent results  Glucose  Recent Labs     06/23/23  2153   GLUC 202*             Critical Care Time Delivered: Upon my evaluation, this patient had a high probability of imminent or life-threatening deterioration due to septic shock , which required my direct attention, intervention, and personal management  I have personally provided 45 minutes of critical care time, exclusive of procedures, teaching, family meetings, and any prior time recorded by providers other than myself     Anticipated Length of Stay is > 2 midnights  [address] [place]

## 2023-06-24 NOTE — ASSESSMENT & PLAN NOTE
· PTA meds: coreg, imdur   · Hold home antihypertensive medications for now in the setting of hypotension

## 2023-06-24 NOTE — CONSULTS
e-Consult (IP[facility])  - Interventional Radiology  Devika Gave 78 y o  male MRN: [id number]  Unit/Bed#: ICU 04 Encounter: [id number]          Interventional Radiology has been consulted to evaluate Devika Gave    We were consulted by Dr Dian Flores concerning this patient with sepsis  Inpatient Consult to IR  Consult performed by: Vik Dutton MD  Consult ordered by: Lorraine Dean MD        06/24/23    Assessment/Recommendation:   79 yo male admitted with sepsis criteria with several possible sources including a pleural effusion  The CT was reviewed showing a loculated right pleural effusion with enhancing wall concerning for an empyema  Will plan for chest tube placement today  5-10 minutes, >50% of the total time devoted to medical consultative verbal/EMR discussion between providers  Written report will be generated in the EMR  Thank you for allowing Interventional Radiology to participate in the care of Devika Gave  Please don't hesitate to call or TigerText us with any questions       Vik Dutton MD

## 2023-06-24 NOTE — QUICK NOTE
Reached out to on call thoracic surgeon Dr Deshawn Leon regarding need for possible intervention for right empyema on CT scan  Dr Linda Collado reviewed imaging and recommended an IR placed chest tube with cultures sent and possible TPA/dornase  Consult placed to IR and on call IR physician Dr Ela Ledbetter will be in later this afternoon

## 2023-06-24 NOTE — ASSESSMENT & PLAN NOTE
· Echo in February with moderate pericardial effusion that appeared to be chronic in nature  · CT chest with evidence of effusion on admission  · Limited echo pending, monitor for signs/symptoms of pericardial tamponade   · Hold home eliquis until assessment of pericardial fluid

## 2023-06-24 NOTE — ASSESSMENT & PLAN NOTE
· Temp of 91 degrees on presentation   · Hypothermia likely secondary to sepsis  · yola hugger applied  · Continue to slowly rewarm  · Closely trend vitals and electrolytes

## 2023-06-24 NOTE — ASSESSMENT & PLAN NOTE
· Presented with a sodium of 127   · Unclear fluid status, patient with diarrhea x 4 days so concern for hypovolemia however, pedal edema present on physical exam with 6K weight gain   · Given 2L of NSS in the ED    Plan:   · Trend BMPs Q12  · Check serum osmo, urine studies   · Hold home torsemide  · Close I&Os  · Daily weights

## 2023-06-24 NOTE — ED PROVIDER NOTES
History  Chief Complaint   Patient presents with   • Weakness - Generalized     Patient has a visiting nurse who told family to bring him in  Patient has had diarrhea since last Thursday, progressive weakness, and the nurse is worried about dehydration     78-year-old male presents with diarrhea nonbloody for the past couple of days with generalized weakness feeling well  No nausea vomiting abdominal pain cough congestion fevers chills or any other symptoms  No recent antibiotic use  History provided by:  Patient   used: No        Prior to Admission Medications   Prescriptions Last Dose Informant Patient Reported? Taking?    ALPRAZolam (XANAX) 0 5 mg tablet  Child Yes No   Si 5 mg daily at bedtime    Blood Pressure Monitor NILTON  Child No No   Sig: by Does not apply route daily   Eliquis 2 5 MG  Child No No   Sig: TAKE ONE TABLET BY MOUTH TWICE A DAY   Torsemide 40 MG TABS  Child No No   Sig: Take 40 mg by mouth in the morning   ZINC OXIDE PO  Child Yes No   Sig: Take by mouth daily   allopurinol (ZYLOPRIM) 100 mg tablet  Child Yes No   Sig: Take 100 mg by mouth 2 (two) times a day   ascorbic acid (VITAMIN C) 500 MG tablet  Child No No   Sig: Take 1 tablet (500 mg total) by mouth daily   atorvastatin (LIPITOR) 40 mg tablet  Child No No   Sig: Take 1 tablet (40 mg total) by mouth daily with dinner   carvedilol (COREG) 6 25 mg tablet  Child No No   Sig: Take 1 tablet (6 25 mg total) by mouth 2 (two) times a day with meals   cholecalciferol (VITAMIN D3) 1,000 units tablet  Child No No   Sig: Take 1 tablet (1,000 Units total) by mouth daily   Patient taking differently: Take 2,000 Units by mouth daily   glimepiride (AMARYL) 2 mg tablet  Child No No   Sig: Take 1 tablet (2 mg total) by mouth daily with dinner   glimepiride (AMARYL) 4 mg tablet  Child No No   Sig: Take 1 tablet (4 mg total) by mouth daily with breakfast   isosorbide mononitrate (IMDUR) 30 mg 24 hr tablet  Child No No   Sig: Take 1 tablet (30 mg total) by mouth daily   latanoprost (XALATAN) 0 005 % ophthalmic solution  Child Yes No   Si drop daily at bedtime   sitaGLIPtin (JANUVIA) 25 mg tablet  Child No No   Sig: Take 1 tablet (25 mg total) by mouth daily Do not start before 2023  tamsulosin (FLOMAX) 0 4 mg  Child Yes No   Sig: Take 0 4 mg by mouth daily with dinner   timolol (TIMOPTIC) 0 5 % ophthalmic solution  Child No No   Sig: Administer 1 drop to both eyes daily      Facility-Administered Medications: None       Past Medical History:   Diagnosis Date   • Atrial fibrillation ([facility] )    • Chronic kidney disease    • Coronary artery disease    • Diabetes mellitus ([facility] )    • Hyperlipidemia    • Hypertension        Past Surgical History:   Procedure Laterality Date   • EYE SURGERY     • SKIN CANCER EXCISION     • TONSILLECTOMY         Family History   Problem Relation Age of Onset   • Heart disease Mother    • Diabetes Father    • Vision loss Father    • Cancer Sister    • Diabetes Sister      I have reviewed and agree with the history as documented  E-Cigarette/Vaping   • E-Cigarette Use Never User      E-Cigarette/Vaping Substances   • Nicotine No    • THC No    • CBD No    • Flavoring No    • Other No    • Unknown No      Social History     Tobacco Use   • Smoking status: Former   • Smokeless tobacco: Former   Vaping Use   • Vaping Use: Never used   Substance Use Topics   • Alcohol use: Not Currently     Alcohol/week: 0 0 standard drinks of alcohol     Comment: special occasions   • Drug use: Not Currently       Review of Systems   Constitutional: Negative  HENT: Negative  Eyes: Negative  Respiratory: Negative  Cardiovascular: Negative  Gastrointestinal: Positive for diarrhea  Endocrine: Negative  Genitourinary: Negative  Musculoskeletal: Negative  Skin: Negative  Allergic/Immunologic: Negative  Neurological: Positive for weakness  Hematological: Negative  Psychiatric/Behavioral: Negative  All other systems reviewed and are negative  Physical Exam  Physical Exam  Vitals and nursing note reviewed  Constitutional:       Appearance: Normal appearance  HENT:      Head: Normocephalic and atraumatic  Nose: Nose normal       Mouth/Throat:      Mouth: Mucous membranes are moist    Eyes:      Extraocular Movements: Extraocular movements intact  Pupils: Pupils are equal, round, and reactive to light  Cardiovascular:      Rate and Rhythm: Normal rate and regular rhythm  Pulmonary:      Effort: Pulmonary effort is normal       Breath sounds: Normal breath sounds  Abdominal:      General: Abdomen is flat  Bowel sounds are normal  There is distension  Palpations: Abdomen is soft  There is no mass  Tenderness: There is abdominal tenderness  There is no right CVA tenderness, left CVA tenderness, guarding or rebound  Hernia: No hernia is present  Musculoskeletal:         General: Normal range of motion  Cervical back: Normal range of motion and neck supple  Skin:     General: Skin is warm  Capillary Refill: Capillary refill takes less than 2 seconds  Neurological:      General: No focal deficit present  Mental Status: He is alert and oriented to person, place, and time  Mental status is at baseline  Psychiatric:         Mood and Affect: Mood normal          Thought Content:  Thought content normal          Vital Signs  ED Triage Vitals   Temperature Pulse Respirations Blood Pressure SpO2   06/23/23 2200 06/23/23 2138 06/23/23 2138 06/23/23 2138 06/23/23 2138   (!) 91 3 °F (32 9 °C) 57 (!) 25 (!) 77/45 100 %      Temp Source Heart Rate Source Patient Position - Orthostatic VS BP Location FiO2 (%)   06/23/23 2200 06/23/23 2138 06/23/23 2138 06/23/23 2138 --   Rectal Monitor Lying Left arm       Pain Score       06/23/23 2138       No Pain           Vitals:    06/23/23 2300 06/23/23 2315 06/23/23 2330 06/23/23 2345   BP: "(!) 87/52 94/53 103/52 118/54   Pulse: (!) 52 (!) 51 (!) 54 (!) 53   Patient Position - Orthostatic VS: Lying Lying Lying Lying         Visual Acuity      ED Medications  Medications   norepinephrine (LEVOPHED) 4 mg (STANDARD CONCENTRATION) IV in sodium chloride 0 9% 250 mL (3 mcg/min Intravenous New Bag 6/23/23 2249)   cefepime (MAXIPIME) IVPB (premix in dextrose) 1,000 mg 50 mL (has no administration in time range)   sodium chloride 0 9 % bolus 1,000 mL (0 mL Intravenous Stopped 6/23/23 2249)   sodium chloride 0 9 % bolus 1,000 mL (1,000 mL Intravenous New Bag 6/23/23 2200)   cefepime (MAXIPIME) IVPB (premix in dextrose) 2,000 mg 50 mL (0 mg Intravenous Stopped 6/23/23 2246)       Diagnostic Studies  Results Reviewed     Procedure Component Value Units Date/Time    HS Troponin I 2hr [[id number]]  (Normal) Collected: 06/23/23 2357    Lab Status: Final result Specimen: Blood from Arm, Left Updated: 06/24/23 0032     hs TnI 2hr 6 ng/L      Delta 2hr hsTnI 0 ng/L     Osmolality-\"If this is regarding a toxic alcohol, STOP  Test is not routinely indicated  Please consult medical  on call for further guidance  \" [[id number]] Collected: 06/23/23 2357    Lab Status:  In process Specimen: Blood Updated: 06/24/23 0023    Strep Pneumoniae, Urine [[id number]]     Lab Status: No result Specimen: Urine     Legionella antigen, urine [[id number]]     Lab Status: No result Specimen: Urine     RBC Morphology Reflex Test [[id number]] Collected: 06/23/23 2153    Lab Status: Final result Specimen: Blood from Arm, Right Updated: 06/24/23 0001    HS Troponin I 4hr [[id number]]     Lab Status: No result Specimen: Blood     Sodium, urine, random [[id number]]     Lab Status: No result Specimen: Urine     Osmolality, urine [[id number]]     Lab Status: No result Specimen: Urine     Creatinine, urine, random [[id number]]     Lab Status: No result Specimen: Urine     Clostridium difficile toxin by PCR with EIA [[id number]]     Lab Status: No " result Specimen: Stool from Per Rectum     Ova and parasite examination [[id number]]     Lab Status: No result Specimen: Stool     CBC and differential [[id number]]  (Abnormal) Collected: 06/23/23 2153    Lab Status: Final result Specimen: Blood from Arm, Right Updated: 06/23/23 2308     WBC 3 64 Thousand/uL      RBC 3 09 Million/uL      Hemoglobin 10 7 g/dL      Hematocrit 32 5 %       fL      MCH 34 6 pg      MCHC 32 9 g/dL      RDW 15 3 %      MPV 11 0 fL      Platelets 66 Thousands/uL     Narrative: This is an appended report  These results have been appended to a previously verified report  Manual Differential(PHLEBS Do Not Order) [[id number]]  (Abnormal) Collected: 06/23/23 2153    Lab Status: Final result Specimen: Blood from Arm, Right Updated: 06/23/23 2308     Segmented % 51 %      Bands % 13 %      Lymphocytes % 24 %      Monocytes % 11 %      Eosinophils, % 1 %      Basophils % 0 %      Absolute Neutrophils 2 33 Thousand/uL      Lymphocytes Absolute 0 87 Thousand/uL      Monocytes Absolute 0 40 Thousand/uL      Eosinophils Absolute 0 04 Thousand/uL      Basophils Absolute 0 00 Thousand/uL      Total Counted --     RBC Morphology Normal     Platelet Estimate Decreased     Large Platelet Present    FLU/RSV/COVID - if FLU/RSV clinically relevant [[id number]]  (Normal) Collected: 06/23/23 2153    Lab Status: Final result Specimen: Nares from Nose Updated: 06/23/23 2251     SARS-CoV-2 Negative     INFLUENZA A PCR Negative     INFLUENZA B PCR Negative     RSV PCR Negative    Narrative:      FOR PEDIATRIC PATIENTS - copy/paste COVID Guidelines URL to browser: [email or web address]  [employer]    SARS-CoV-2 assay is a Nucleic Acid Amplification assay intended for the  qualitative detection of nucleic acid from SARS-CoV-2 in nasopharyngeal  swabs  Results are for the presumptive identification of SARS-CoV-2 RNA      Positive results are indicative of infection with SARS-CoV-2, the virus  causing COVID-19, but do not rule out bacterial infection or co-infection  with other viruses  Laboratories within the [place] and its  territories are required to report all positive results to the appropriate  public health authorities  Negative results do not preclude SARS-CoV-2  infection and should not be used as the sole basis for treatment or other  patient management decisions  Negative results must be combined with  clinical observations, patient history, and epidemiological information  This test has not been FDA cleared or approved  This test has been authorized by FDA under an Emergency Use Authorization  (EUA)  This test is only authorized for the duration of time the  declaration that circumstances exist justifying the authorization of the  emergency use of an in vitro diagnostic tests for detection of SARS-CoV-2  virus and/or diagnosis of COVID-19 infection under section 564(b)(1) of  the Act, 21 U  S C  564SYK-5(Q)(3), unless the authorization is terminated  or revoked sooner  The test has been validated but independent review by FDA  and CLIA is pending  Test performed using [employer] Gene[employer]pert: This RT-PCR assay targets N2,  a region unique to SARS-CoV-2  A conserved region in the E-gene was chosen  for pan-Sarbecovirus detection which includes SARS-CoV-2  According to CMS-2020-01-R, this platform meets the definition of high-throughput technology      B-Type Natriuretic Peptide(BNP) [[id number]]  (Abnormal) Collected: 06/23/23 2209    Lab Status: Final result Specimen: Blood from Arm, Left Updated: 06/23/23 2244      pg/mL     HS Troponin 0hr (reflex protocol) [[id number]]  (Normal) Collected: 06/23/23 2153    Lab Status: Final result Specimen: Blood from Arm, Right Updated: 06/23/23 2229     hs TnI 0hr 6 ng/L     Comprehensive metabolic panel [[id number]]  (Abnormal) Collected: 06/23/23 2153    Lab Status: Final result Specimen: Blood from Arm, Right Updated: 06/23/23 2220     Sodium 127 mmol/L      Potassium 4 5 mmol/L      Chloride 93 mmol/L      CO2 23 mmol/L      ANION GAP 11 mmol/L       mg/dL      Creatinine 3 72 mg/dL      Glucose 202 mg/dL      Calcium 7 4 mg/dL      Corrected Calcium 8 0 mg/dL      AST 21 U/L      ALT 25 U/L      Alkaline Phosphatase 122 U/L      Total Protein 5 8 g/dL      Albumin 3 2 g/dL      Total Bilirubin 0 62 mg/dL      eGFR 14 ml/min/1 73sq m     Narrative:      [place] guidelines for Chronic Kidney Disease (CKD):   •  Stage 1 with normal or high GFR (GFR > 90 mL/min/1 73 square meters)  •  Stage 2 Mild CKD (GFR = 60-89 mL/min/1 73 square meters)  •  Stage 3A Moderate CKD (GFR = 45-59 mL/min/1 73 square meters)  •  Stage 3B Moderate CKD (GFR = 30-44 mL/min/1 73 square meters)  •  Stage 4 Severe CKD (GFR = 15-29 mL/min/1 73 square meters)  •  Stage 5 End Stage CKD (GFR <15 mL/min/1 73 square meters)  Note: GFR calculation is accurate only with a steady state creatinine    Magnesium [[id number]]  (Normal) Collected: 06/23/23 2153    Lab Status: Final result Specimen: Blood from Arm, Right Updated: 06/23/23 2220     Magnesium 2 5 mg/dL     Lactic acid, plasma (w/reflex if result > 2 0) [[id number]]  (Normal) Collected: 06/23/23 2153    Lab Status: Final result Specimen: Blood from Arm, Right Updated: 06/23/23 2219     LACTIC ACID 1 7 mmol/L     Narrative:      Result may be elevated if tourniquet was used during collection  Fingerstick Glucose (POCT) [[id number]]  (Abnormal) Collected: 06/23/23 2211    Lab Status: Final result Updated: 06/23/23 2213     POC Glucose 249 mg/dl     Blood culture #1 [[id number]] Collected: 06/23/23 2202    Lab Status: In process Specimen: Blood from Arm, Left Updated: 06/23/23 2212    Blood culture #2 [[id number]] Collected: 06/23/23 2153    Lab Status:  In process Specimen: Blood from Arm, Right Updated: 06/23/23 2202    UA (URINE) with reflex to Scope [[id number]] Lab Status: No result Specimen: Urine                  XR chest 1 view portable    (Results Pending)   CT chest abdomen pelvis wo contrast    (Results Pending)              Procedures  ECG 12 Lead Documentation Only    Performed by: Tacos Perez DO  Authorized by: Tacos Perez DO    ECG reviewed by me, the ED Provider: yes    Patient location:  ED  Previous ECG:     Previous ECG:  Unavailable    Comparison to cardiac monitor: Yes    Interpretation:     Interpretation: non-specific    Rate:     ECG rate assessment: bradycardic    Rhythm:     Rhythm: atrial fibrillation    Ectopy:     Ectopy: none    QRS:     QRS axis:  Normal  Conduction:     Conduction: normal    ST segments:     ST segments:  Non-specific  T waves:     T waves: non-specific    CriticalCare Time    Date/Time: 6/24/2023 12:30 AM    Performed by: Tacos Perez DO  Authorized by: Tacos Perez DO    Critical care provider statement:     Critical care time (minutes):  45    Critical care was necessary to treat or prevent imminent or life-threatening deterioration of the following conditions:  Metabolic crisis    Critical care was time spent personally by me on the following activities:  Blood draw for specimens, obtaining history from patient or surrogate, development of treatment plan with patient or surrogate, discussions with consultants, evaluation of patient's response to treatment, examination of patient, interpretation of cardiac output measurements, ordering and performing treatments and interventions, ordering and review of laboratory studies, ordering and review of radiographic studies, re-evaluation of patient's condition and review of old charts    I assumed direction of critical care for this patient from another provider in my specialty: no               ED Course                            Initial Sepsis Screening     Row Name 06/24/23 0031                Is the patient's history suggestive of a new or worsening infection?  Yes (Proceed) " -SA        Suspected source of infection acute abdominal infection  -SA        Indicate SIRS criteria Hyperthemia > 38 3C (100 9F) OR Hypothermia <36C (96 8F); Tachycardia > 90 bpm;Leukocytosis (WBC > 37379 IJL) OR Leukopenia (WBC <4000 IJL) OR Bandemia (WBC >10% bands)  -SA        Are two or more of the above signs & symptoms of infection both present and new to the patient? Yes (Proceed)  -SA        Assess for evidence of organ dysfunction: Are any of the below criteria present within 6 hours of suspected infection and SIRS criteria that are NOT considered to be chronic conditions? SBP < 90;MAP < 65;Creatinine > 2 0  -SA        Date of presentation of severe sepsis 06/24/23  -SA        Time of presentation of severe sepsis --        Date of presentation of septic shock --        Time of presentation of septic shock --        Fluid Resuscitation: A lesser volume than 30 ml/kg IV fluid will be given  -SA        The 30 mL/kg fluid bolus was not given to the patient despite having hypotension, a lactate of >= 4 mmol/L, or documentation of septic shock secondary to: Concern for fluid/volume overload;Heart Failure  -SA        Instead of the 30 ml/kg fluid bolus, the following volume of crystalloid fluid will be ordered: 2L  -SA        Of the following fluid type: NSS  -SA        Is the patient is persistently hypotensive in the hour after fluid bolus administration? If yes, patient meets criteria for vasopressor use  NO  -SA        Sepsis Note: Click \"NEXT\" below (NOT \"close\") to generate sepsis note based on above information   --              User Key  (r) = Recorded By, (t) = Taken By, (c) = Cosigned By    [address] Name Provider Type    SA Elvis Speed, DO Physician              Default Flowsheet Data (last 720 hours)     Sepsis Reassess     Row Name 06/24/23 0032                   Repeat Volume Status and Tissue Perfusion Assessment Performed    Date of Reassessment: 06/24/23  -SA        Time of Reassessment: --     " "   Sepsis Reassessment Note: Click \"NEXT\" below (NOT \"close\") to generate sepsis reassessment note  YES (proceed by clicking \"NEXT\")  -SA        Repeat Volume Status and Tissue Perfusion Assessment Performed --              User Key  (r) = Recorded By, (t) = Taken By, (c) = Cosigned By    [address] Name Provider Type    SA Ca Nice DO Physician              SBIRT 20yo+    Flowsheet Row Most Recent Value   Initial Alcohol Screen: US AUDIT-C     1  How often do you have a drink containing alcohol? 0 Filed at: 06/23/2023 2149   2  How many drinks containing alcohol do you have on a typical day you are drinking? 0 Filed at: 06/23/2023 2149   3a  Male UNDER 65: How often do you have five or more drinks on one occasion? 0 Filed at: 06/23/2023 2149   3b  FEMALE Any Age, or MALE 65+: How often do you have 4 or more drinks on one occassion? 0 Filed at: 06/23/2023 2149   Audit-C Score 0 Filed at: 06/23/2023 2149   ALEJANDRO: How many times in the past year have you    Used an illegal drug or used a prescription medication for non-medical reasons? Never Filed at: 06/23/2023 2149                    Medical Decision Making  Patient evaluated with labs,imaging ekg  Given iv fluids,antibiotics and started on levophed  Blood pressure improved, admitted to icu for further evaluation and management  Renal failure: acute illness or injury  Shock [facility]): acute illness or injury  Weakness: acute illness or injury  Amount and/or Complexity of Data Reviewed  Labs: ordered  Radiology: ordered  Risk  Prescription drug management  Decision regarding hospitalization            Disposition  Final diagnoses:   Shock ([address] )   Weakness   Renal failure     Time reflects when diagnosis was documented in both MDM as applicable and the Disposition within this note     Time User Action Codes Description Comment    6/23/2023 11:22 PM AnepuSpencerColette Add [R57 9] Shock ([address] )     6/23/2023 11:22 PM Anepu Colette Add [R53 1] Weakness     6/23/2023 " 11:22 PM DiAngus Add [N19] Renal failure       ED Disposition     ED Disposition   Admit    Condition   Stable    Date/Time   Fri Jun 23, 2023 11:22 PM    Comment               Follow-up Information    None         Current Discharge Medication List      CONTINUE these medications which have NOT CHANGED    Details   allopurinol (ZYLOPRIM) 100 mg tablet Take 100 mg by mouth 2 (two) times a day      ALPRAZolam (XANAX) 0 5 mg tablet 0 5 mg daily at bedtime   Refills: 0      ascorbic acid (VITAMIN C) 500 MG tablet Take 1 tablet (500 mg total) by mouth daily  Refills: 0    Associated Diagnoses: Cellulitis of left hand      atorvastatin (LIPITOR) 40 mg tablet Take 1 tablet (40 mg total) by mouth daily with dinner  Qty: 30 tablet, Refills: 0    Associated Diagnoses: NSTEMI (non-ST elevated myocardial infarction) ([facility])      Blood Pressure Monitor NILTON by Does not apply route daily  Qty: 1 Device, Refills: 0    Associated Diagnoses: 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 ! glimepiride (AMARYL) 2 mg tablet Take 1 tablet (2 mg total) by mouth daily with dinner  Qty: 30 tablet, Refills: 0    Associated Diagnoses: 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 ([facility] )      ! ! glimepiride (AMARYL) 4 mg tablet Take 1 tablet (4 mg total) by mouth daily with breakfast  Refills: 0    Associated Diagnoses: 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 ([facility])      isosorbide mononitrate (IMDUR) 30 mg 24 hr tablet Take 1 tablet (30 mg total) by mouth daily  Qty: 30 tablet, Refills: 0    Associated Diagnoses: NSTEMI (non-ST elevated myocardial infarction) ([facility])      latanoprost (XALATAN) 0 005 % ophthalmic solution 1 drop daily at bedtime      sitaGLIPtin (JANUVIA) 25 mg tablet Take 1 tablet (25 mg total) by mouth daily Do not start before February 28, 2023  Qty: 30 tablet, Refills: 0    Associated Diagnoses: 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 ([facility])      tamsulosin (FLOMAX) 0 4 mg Take 0 4 mg by mouth daily with dinner      timolol (TIMOPTIC) 0 5 % ophthalmic solution Administer 1 drop to both eyes daily    Associated Diagnoses: Glaucoma      Torsemide 40 MG TABS Take 40 mg by mouth in the morning  Qty: 30 tablet, Refills: 0    Associated Diagnoses: Chronic kidney disease-mineral and bone disorder; Chronic combined systolic and diastolic congestive heart failure ([facility])      ZINC OXIDE PO Take by mouth daily       ! ! - Potential duplicate medications found  Please discuss with provider  No discharge procedures on file      PDMP Review     None          ED Provider  Electronically Signed by           Riley Torres DO  06/24/23 5625

## 2023-06-24 NOTE — ASSESSMENT & PLAN NOTE
Wt Readings from Last 3 Encounters:   06/23/23 72 kg (158 lb 11 7 oz)   05/11/23 66 7 kg (147 lb)   03/23/23 68 9 kg (152 lb)       · Echo 4/1/23 with EF 40-45%, grade II diastolic dysfunction, moderate TR, moderate chronic pericardial effusion   · Weight is up 6KG from previous (1 month ago)  ·   · Hold further fluid administration  · Hold diuretics for now but consider tomorrow pending urine output, renal function   · Follow up limited echo   · Close I&Os  · Daily weights

## 2023-06-24 NOTE — ASSESSMENT & PLAN NOTE
· Presents with 4 days of diarrhea  Patient's son denies fever/chills, nausea or vomiting  · Check c   Diff, stool O&P  · Monitor stool output

## 2023-06-24 NOTE — ASSESSMENT & PLAN NOTE
· Acute on chronic thrombocytopenia  · Platelets 39O on admission   · Recent baseline 90-120s  · Continue to trend platelets

## 2023-06-24 NOTE — ASSESSMENT & PLAN NOTE
Lab Results   Component Value Date    EGFR 14 06/23/2023    EGFR 20 (L) 05/12/2023    EGFR 26 05/12/2023    CREATININE 3 72 (H) 06/23/2023    CREATININE 2 18 (H) 05/12/2023    CREATININE 2 33 03/07/2023     · CKD IV with baseline creatinine 2 5 - 3 0 on outpatient Nephrology notes  · Presents with creatinine of 3 7 from recent 2 18 (1 month ago)  · KARINA on CKD ?  pre-renal secondary to diarrhea, consideration for component of obstructive nephropathy, vs cardiorenal   · Insert victor  · Urine lytes pending   · Hold home torsemide  · Continue flomax  · Close I&Os  · Daily weights

## 2023-06-24 NOTE — ASSESSMENT & PLAN NOTE
Lab Results   Component Value Date    HGBA1C 8 3 (H) 02/23/2023       Recent Labs     06/23/23  2211   POCGLU 249*       Blood Sugar Average: Last 72 hrs:  (P) 249     · PTA meds: amaryl, januvia  · Hold home meds  · Start SSI   · Goal BG <180

## 2023-06-24 NOTE — ASSESSMENT & PLAN NOTE
· Concern for right lower lobe pneumonia vs effusion   · Patient is tachypneic but not in distress, currently saturating well on room air  · Continue cefepime  · Doubtful that we will be able to obtain a sputum cx at this time  · Check strep pneumo/legionella urine antigens   · Pulmonary hygiene

## 2023-06-24 NOTE — ASSESSMENT & PLAN NOTE
· Chronic Afib, HR currently 40-60s on arrival  · On dose reduced eliquis of 2 5mg BID, hold for now pending pericardial fluid assessment   · Slow ventricular response secondary to hypothermia vs possible BRASH syndrome  · Hold coreg  · Slowly rewarm with Ruben hugger  · Closely monitor HR on telemetry  · Correct electrolytes

## 2023-06-24 NOTE — BRIEF OP NOTE (RAD/CATH)
Chest Tube Placement Procedure Note    PATIENT NAME: Harshal Anne  : 1940  MRN: [id number]     Pre-op Diagnosis:   1  Shock ([address] )    2  Weakness    3  Renal failure    4  Right lower lobe consolidation ([address] )    5  Thrombocytopenia ([address] )    6  Septic shock (HCC)      Post-op Diagnosis:   1  Shock ([address] )    2  Weakness    3  Renal failure    4  Right lower lobe consolidation ([address] )    5  Thrombocytopenia ([address] )    6  Septic shock [facility])        Surgeon:   Marko Uribe DO  Assistants:     No qualified resident was available  Estimated Blood Loss: none  Findings:    Moderate sized R pleural effusion, few septations, mostly anechoic by US  10F pigtail chest tube placed  Connected to Pleuravac    Specimens: Yellow pleural fluid    Complications:  none    Anesthesia: local    Marko Uribe DO     Date: 2023  Time: 5:14 PM

## 2023-06-24 NOTE — ASSESSMENT & PLAN NOTE
Sepsis criteria met on admission as evidence by hypothermia (91 degrees), leukopenia, tachypnea and hypotension  · ED course: given 2L crystalloid, cefepime  Started on levophed 3mcg/min  Ruben hugger applied   · Imaging:   · CXR concerning for RLL consolidation  · CT C/A/P pending   · BC pending  · UA pending  · LA 1 7    Plan:  · Continue levophed, wean for goal MAP >65  · Follow culture data   · Send C   Diff/ stool O&P given diarrhea   · Check strep pneumo/legionalla urine antigens  · Continue cefepime  · Trend WBC, fever curve

## 2023-06-25 PROBLEM — E16.2 HYPOGLYCEMIA: Status: ACTIVE | Noted: 2023-06-25

## 2023-06-25 LAB
ABO GROUP BLD BPU: NORMAL
ABO GROUP BLD BPU: NORMAL
ALBUMIN SERPL BCP-MCNC: 2.8 G/DL (ref 3.5–5)
ALP SERPL-CCNC: 116 U/L (ref 34–104)
ALT SERPL W P-5'-P-CCNC: 23 U/L (ref 7–52)
ANION GAP SERPL CALCULATED.3IONS-SCNC: 7 MMOL/L
AST SERPL W P-5'-P-CCNC: 20 U/L (ref 13–39)
BASOPHILS # BLD AUTO: 0 THOUSANDS/ÂΜL (ref 0–0.1)
BASOPHILS NFR BLD AUTO: 0 % (ref 0–1)
BILIRUB SERPL-MCNC: 0.59 MG/DL (ref 0.2–1)
BPU ID: NORMAL
BPU ID: NORMAL
BUN SERPL-MCNC: 85 MG/DL (ref 5–25)
CALCIUM ALBUM COR SERPL-MCNC: 8.5 MG/DL (ref 8.3–10.1)
CALCIUM SERPL-MCNC: 7.5 MG/DL (ref 8.4–10.2)
CHLORIDE SERPL-SCNC: 102 MMOL/L (ref 96–108)
CO2 SERPL-SCNC: 25 MMOL/L (ref 21–32)
CORTIS SERPL-MCNC: 18.1 UG/DL
CREAT SERPL-MCNC: 3.62 MG/DL (ref 0.6–1.3)
EOSINOPHIL # BLD AUTO: 0 THOUSAND/ÂΜL (ref 0–0.61)
EOSINOPHIL NFR BLD AUTO: 0 % (ref 0–6)
ERYTHROCYTE [DISTWIDTH] IN BLOOD BY AUTOMATED COUNT: 15.6 % (ref 11.6–15.1)
GFR SERPL CREATININE-BSD FRML MDRD: 14 ML/MIN/1.73SQ M
GLUCOSE SERPL-MCNC: 118 MG/DL (ref 65–140)
GLUCOSE SERPL-MCNC: 128 MG/DL (ref 65–140)
GLUCOSE SERPL-MCNC: 137 MG/DL (ref 65–140)
GLUCOSE SERPL-MCNC: 184 MG/DL (ref 65–140)
GLUCOSE SERPL-MCNC: 237 MG/DL (ref 65–140)
GLUCOSE SERPL-MCNC: 284 MG/DL (ref 65–140)
GLUCOSE SERPL-MCNC: 34 MG/DL (ref 65–140)
GLUCOSE SERPL-MCNC: 38 MG/DL (ref 65–140)
GLUCOSE SERPL-MCNC: 40 MG/DL (ref 65–140)
GLUCOSE SERPL-MCNC: 61 MG/DL (ref 65–140)
GLUCOSE SERPL-MCNC: 64 MG/DL (ref 65–140)
GLUCOSE SERPL-MCNC: 66 MG/DL (ref 65–140)
GLUCOSE SERPL-MCNC: 69 MG/DL (ref 65–140)
GLUCOSE SERPL-MCNC: 77 MG/DL (ref 65–140)
GLUCOSE SERPL-MCNC: 77 MG/DL (ref 65–140)
GLUCOSE SERPL-MCNC: 84 MG/DL (ref 65–140)
HCT VFR BLD AUTO: 30.3 % (ref 36.5–49.3)
HGB BLD-MCNC: 10.1 G/DL (ref 12–17)
IMM GRANULOCYTES # BLD AUTO: 0.02 THOUSAND/UL (ref 0–0.2)
IMM GRANULOCYTES NFR BLD AUTO: 1 % (ref 0–2)
LYMPHOCYTES # BLD AUTO: 1.09 THOUSANDS/ÂΜL (ref 0.6–4.47)
LYMPHOCYTES NFR BLD AUTO: 26 % (ref 14–44)
MAGNESIUM SERPL-MCNC: 2.2 MG/DL (ref 1.9–2.7)
MCH RBC QN AUTO: 35.1 PG (ref 26.8–34.3)
MCHC RBC AUTO-ENTMCNC: 33.3 G/DL (ref 31.4–37.4)
MCV RBC AUTO: 105 FL (ref 82–98)
MONOCYTES # BLD AUTO: 0.53 THOUSAND/ÂΜL (ref 0.17–1.22)
MONOCYTES NFR BLD AUTO: 13 % (ref 4–12)
MRSA NOSE QL CULT: NORMAL
NEUTROPHILS # BLD AUTO: 2.6 THOUSANDS/ÂΜL (ref 1.85–7.62)
NEUTS SEG NFR BLD AUTO: 60 % (ref 43–75)
NRBC BLD AUTO-RTO: 0 /100 WBCS
PH BODY FLUID: 7.4
PHOSPHATE SERPL-MCNC: 4.2 MG/DL (ref 2.3–4.1)
PLATELET # BLD AUTO: 58 THOUSANDS/UL (ref 149–390)
PMV BLD AUTO: 11.3 FL (ref 8.9–12.7)
POTASSIUM SERPL-SCNC: 3.8 MMOL/L (ref 3.5–5.3)
PROT SERPL-MCNC: 5.2 G/DL (ref 6.4–8.4)
RBC # BLD AUTO: 2.88 MILLION/UL (ref 3.88–5.62)
SODIUM SERPL-SCNC: 134 MMOL/L (ref 135–147)
TSH SERPL DL<=0.05 MIU/L-ACNC: 4.05 UIU/ML (ref 0.45–4.5)
UNIT DISPENSE STATUS: NORMAL
UNIT DISPENSE STATUS: NORMAL
UNIT PRODUCT CODE: NORMAL
UNIT PRODUCT CODE: NORMAL
UNIT PRODUCT VOLUME: 207 ML
UNIT PRODUCT VOLUME: 337 ML
UNIT RH: NORMAL
UNIT RH: NORMAL
WBC # BLD AUTO: 4.24 THOUSAND/UL (ref 4.31–10.16)

## 2023-06-25 PROCEDURE — 99291 CRITICAL CARE FIRST HOUR: CPT | Performed by: INTERNAL MEDICINE

## 2023-06-25 PROCEDURE — 94760 N-INVAS EAR/PLS OXIMETRY 1: CPT

## 2023-06-25 PROCEDURE — 82948 REAGENT STRIP/BLOOD GLUCOSE: CPT

## 2023-06-25 PROCEDURE — 94640 AIRWAY INHALATION TREATMENT: CPT

## 2023-06-25 PROCEDURE — 85025 COMPLETE CBC W/AUTO DIFF WBC: CPT | Performed by: NURSE PRACTITIONER

## 2023-06-25 PROCEDURE — 82533 TOTAL CORTISOL: CPT | Performed by: NURSE PRACTITIONER

## 2023-06-25 PROCEDURE — 3E0L3GC INTRODUCTION OF OTHER THERAPEUTIC SUBSTANCE INTO PLEURAL CAVITY, PERCUTANEOUS APPROACH: ICD-10-PCS | Performed by: INTERNAL MEDICINE

## 2023-06-25 PROCEDURE — 84100 ASSAY OF PHOSPHORUS: CPT | Performed by: NURSE PRACTITIONER

## 2023-06-25 PROCEDURE — 80053 COMPREHEN METABOLIC PANEL: CPT | Performed by: NURSE PRACTITIONER

## 2023-06-25 PROCEDURE — 82947 ASSAY GLUCOSE BLOOD QUANT: CPT | Performed by: INTERNAL MEDICINE

## 2023-06-25 PROCEDURE — 83735 ASSAY OF MAGNESIUM: CPT | Performed by: NURSE PRACTITIONER

## 2023-06-25 PROCEDURE — 84443 ASSAY THYROID STIM HORMONE: CPT | Performed by: NURSE PRACTITIONER

## 2023-06-25 RX ORDER — DEXTROSE MONOHYDRATE 50 MG/ML
25 INJECTION, SOLUTION INTRAVENOUS CONTINUOUS
Status: DISCONTINUED | OUTPATIENT
Start: 2023-06-25 | End: 2023-06-25

## 2023-06-25 RX ORDER — POTASSIUM CHLORIDE 14.9 MG/ML
20 INJECTION INTRAVENOUS ONCE
Status: COMPLETED | OUTPATIENT
Start: 2023-06-25 | End: 2023-06-25

## 2023-06-25 RX ORDER — DEXTROSE MONOHYDRATE 25 G/50ML
INJECTION, SOLUTION INTRAVENOUS
Status: COMPLETED
Start: 2023-06-25 | End: 2023-06-25

## 2023-06-25 RX ORDER — DEXTROSE MONOHYDRATE 100 MG/ML
50 INJECTION, SOLUTION INTRAVENOUS CONTINUOUS
Status: DISCONTINUED | OUTPATIENT
Start: 2023-06-25 | End: 2023-06-26

## 2023-06-25 RX ORDER — DEXTROSE MONOHYDRATE 25 G/50ML
12.5 INJECTION, SOLUTION INTRAVENOUS ONCE
Status: COMPLETED | OUTPATIENT
Start: 2023-06-25 | End: 2023-06-25

## 2023-06-25 RX ORDER — DEXTROSE MONOHYDRATE 25 G/50ML
50 INJECTION, SOLUTION INTRAVENOUS ONCE
Status: COMPLETED | OUTPATIENT
Start: 2023-06-25 | End: 2023-06-25

## 2023-06-25 RX ORDER — IPRATROPIUM BROMIDE AND ALBUTEROL SULFATE 2.5; .5 MG/3ML; MG/3ML
3 SOLUTION RESPIRATORY (INHALATION)
Status: DISCONTINUED | OUTPATIENT
Start: 2023-06-25 | End: 2023-06-28 | Stop reason: HOSPADM

## 2023-06-25 RX ADMIN — CEFEPIME HYDROCHLORIDE 1000 MG: 1 INJECTION, SOLUTION INTRAVENOUS at 20:56

## 2023-06-25 RX ADMIN — DEXTROSE MONOHYDRATE 12.5 G: 25 INJECTION, SOLUTION INTRAVENOUS at 03:52

## 2023-06-25 RX ADMIN — PHYTONADIONE 10 MG: 10 INJECTION, EMULSION INTRAMUSCULAR; INTRAVENOUS; SUBCUTANEOUS at 10:50

## 2023-06-25 RX ADMIN — IPRATROPIUM BROMIDE AND ALBUTEROL SULFATE 3 ML: 2.5; .5 SOLUTION RESPIRATORY (INHALATION) at 19:19

## 2023-06-25 RX ADMIN — TIMOLOL MALEATE 1 DROP: 5 SOLUTION/ DROPS OPHTHALMIC at 09:28

## 2023-06-25 RX ADMIN — CHLORHEXIDINE GLUCONATE 0.12% ORAL RINSE 15 ML: 1.2 LIQUID ORAL at 09:27

## 2023-06-25 RX ADMIN — POTASSIUM CHLORIDE 20 MEQ: 14.9 INJECTION, SOLUTION INTRAVENOUS at 09:00

## 2023-06-25 RX ADMIN — DEXTROSE MONOHYDRATE 50 ML: 25 INJECTION, SOLUTION INTRAVENOUS at 09:36

## 2023-06-25 RX ADMIN — CHLORHEXIDINE GLUCONATE 0.12% ORAL RINSE 15 ML: 1.2 LIQUID ORAL at 20:56

## 2023-06-25 RX ADMIN — IPRATROPIUM BROMIDE AND ALBUTEROL SULFATE 3 ML: 2.5; .5 SOLUTION RESPIRATORY (INHALATION) at 14:39

## 2023-06-25 RX ADMIN — CEFEPIME HYDROCHLORIDE 1000 MG: 1 INJECTION, SOLUTION INTRAVENOUS at 09:02

## 2023-06-25 RX ADMIN — DEXTROSE 25 ML/HR: 10 SOLUTION INTRAVENOUS at 09:20

## 2023-06-25 RX ADMIN — IPRATROPIUM BROMIDE AND ALBUTEROL SULFATE 3 ML: 2.5; .5 SOLUTION RESPIRATORY (INHALATION) at 07:10

## 2023-06-25 RX ADMIN — ATORVASTATIN CALCIUM 40 MG: 40 TABLET, FILM COATED ORAL at 17:12

## 2023-06-25 RX ADMIN — DORNASE ALFA 5 MG: 1 SOLUTION RESPIRATORY (INHALATION) at 10:40

## 2023-06-25 RX ADMIN — ALTEPLASE 10 MG: 2.2 INJECTION, POWDER, LYOPHILIZED, FOR SOLUTION INTRAVENOUS at 10:40

## 2023-06-25 RX ADMIN — TAMSULOSIN HYDROCHLORIDE 0.4 MG: 0.4 CAPSULE ORAL at 17:12

## 2023-06-25 RX ADMIN — DEXTROSE 25 ML/HR: 5 SOLUTION INTRAVENOUS at 04:19

## 2023-06-25 NOTE — ASSESSMENT & PLAN NOTE
Lab Results   Component Value Date    EGFR 14 06/24/2023    EGFR 14 06/23/2023    EGFR 20 (L) 05/12/2023    EGFR 26 05/12/2023    CREATININE 3 57 (H) 06/24/2023    CREATININE 3 72 (H) 06/23/2023    CREATININE 2 18 (H) 05/12/2023     · CKD IV with baseline creatinine 2 5 - 3 0 on outpatient Nephrology notes  · Presents with creatinine of 3 7 from recent 2 18 (1 month ago)  · KARINA on CKD: consideration for component of obstructive nephropathy vs ? cardiorenal   · Maintain victor for accurate I&Os  · Urine lytes pending   · Daily consideration for diuretics given anasarca   · Continue flomax  · Close I&Os  · Daily weights

## 2023-06-25 NOTE — QUICK NOTE
Received notification that patient's son Maggy Buck wanted to speak to the ICU team   He stated that he had a long discussion with his aunts and that he is requesting to continue all care but make his father a DNR/DNI

## 2023-06-25 NOTE — PLAN OF CARE
Problem: RESPIRATORY - ADULT  Goal: Achieves optimal ventilation and oxygenation  Description: INTERVENTIONS:  - Assess for changes in respiratory status  - Assess for changes in mentation and behavior  - Position to facilitate oxygenation and minimize respiratory effort  - Oxygen administered by appropriate delivery if ordered  - Initiate smoking cessation education as indicated  - Encourage broncho-pulmonary hygiene including cough, deep breathe, Incentive Spirometry  - Assess the need for suctioning and aspirate as needed  - Assess and instruct to report SOB or any respiratory difficulty  - Respiratory Therapy support as indicated  6/25/2023 0646 by Milagros Mckay, RT  Outcome: Progressing  6/25/2023 0646 by Milagros Mckay, RT  Outcome: Progressing

## 2023-06-25 NOTE — ASSESSMENT & PLAN NOTE
· Concern for right lower lobe pneumonia with moderate effusion    · Patient is tachypneic but not in distress, currently saturating well on room air  · S/p IR right chest tube placement on 6/24 with return of small amount of serous fluid  · Pleural fluid culture pending  · Pleural WBC 15, neutrophil  6  · Pleural pH, glucose,LDH pending   · Continue cefepime, day #2  · Doubtful that we will be able to obtain a sputum cx at this time  · Strep pneumo/legionella urine antigens negative  · Pulmonary hygiene   · Swallow study when appropriate

## 2023-06-25 NOTE — QUICK NOTE
TPA/dornase instilled in the chest tube at the bedside  Patient tolerated without any issues and chest tube clamped for two hours  Patient to notify nursing if any SOB or pain  Nursing aware of instillation and clamped chest tube

## 2023-06-25 NOTE — ASSESSMENT & PLAN NOTE
· Presents with 4 days of diarrhea  Patient's son denies fever/chills, nausea or vomiting  · Check c   Diff, stool O&P  · CT without evidence of acute intraabdominal pathology   · Monitor stool output

## 2023-06-25 NOTE — ASSESSMENT & PLAN NOTE
Lab Results   Component Value Date    HGBA1C 8 3 (H) 02/23/2023       Recent Labs     06/24/23  1119 06/24/23  1542 06/24/23  1545 06/24/23  1754   POCGLU 127 69 71 95       Blood Sugar Average: Last 72 hrs:  (P) 160 [id number]     · PTA meds: amaryl, januvia  · Hold home meds  · Start SSI   · Goal BG <180

## 2023-06-25 NOTE — ASSESSMENT & PLAN NOTE
· 2 episodes of profound hypoglycemia overnight  No recent insulin use   Given D50 x2  · Morning CMP pending  · Patient currently NPO secondary to mental status  · Encourage PO if able to tolerate

## 2023-06-25 NOTE — ASSESSMENT & PLAN NOTE
Sepsis criteria met on admission as evidence by hypothermia (91 degrees), leukopenia, tachypnea and hypotension  · ED course: given 2L crystalloid, cefepime  Started on levophed 3mcg/min  Ruben hugger applied   · Source likely pneumonia/empyema vs diarrhea ? · Imaging:   · CXR concerning for RLL consolidation  · CT C/A/P:  Small right pleural effusion with thickened enhancing periphery which may represent empyema  Dense opacity within the right lower lobe may be due to atelectasis versus pneumonia  Opacity at the right lung base likely due to atelectasis  Large pericardial effusion  Anasarca  Cholelithiasis  Gallbladder wall thickening/fluid may be due to volume overload  Further evaluation with HIDA scan may be obtained if there is clinical concern for cholecystitis  Left inguinal hernia contains a loop of nonobstructed proximal sigmoid colon  · BC pending  · UA unremarkable   · LA 1 7  · Strep pneumo/legionella urine antigens negative    Plan:  · Continue levophed, wean for goal MAP >65  · Follow culture data   · Send C   Diff/ stool O&P given diarrhea   · Continue cefepime  · Trend WBC, fever curve

## 2023-06-25 NOTE — PROGRESS NOTES
[facility]  Progress Note  Name: Lilian Martinez  MRN: [id number]  Unit/Bed#: ICU 04 I Date of Admission: 6/23/2023   Date of Service: 6/25/2023 I Hospital Day: 2    Assessment/Plan   * Septic shock [facility])  Assessment & Plan  Sepsis criteria met on admission as evidence by hypothermia (91 degrees), leukopenia, tachypnea and hypotension  · ED course: given 2L crystalloid, cefepime  Started on levophed 3mcg/min  Ruben hugger applied   · Source likely pneumonia/empyema vs diarrhea ? · Imaging:   · CXR concerning for RLL consolidation  · CT C/A/P:  Small right pleural effusion with thickened enhancing periphery which may represent empyema  Dense opacity within the right lower lobe may be due to atelectasis versus pneumonia  Opacity at the right lung base likely due to atelectasis  Large pericardial effusion  Anasarca  Cholelithiasis  Gallbladder wall thickening/fluid may be due to volume overload  Further evaluation with HIDA scan may be obtained if there is clinical concern for cholecystitis  Left inguinal hernia contains a loop of nonobstructed proximal sigmoid colon  · BC pending  · UA unremarkable   · LA 1 7  · Strep pneumo/legionella urine antigens negative    Plan:  · Continue levophed, wean for goal MAP >65  · Follow culture data   · Send C   Diff/ stool O&P given diarrhea   · Continue cefepime  · Trend WBC, fever curve     Atrial fibrillation with slow ventricular response (HCC)  Assessment & Plan  · Chronic Afib, HR 40-60s on arrival  · On dose reduced eliquis of 2 5mg BID, hold for now pending pericardial fluid assessment   · Slow ventricular response secondary to hypothermia vs possible BRASH syndrome  · HR now improved to 60s with rewarming   · Continue to hold coreg due to hypotension   · Closely monitor HR on telemetry  · Correct electrolytes    Acute kidney injury superimposed on chronic kidney disease [facility])  Assessment & Plan  Lab Results   Component Value Date    EGFR 14 06/24/2023    EGFR 14 06/23/2023    EGFR 20 (L) 05/12/2023    EGFR 26 05/12/2023    CREATININE 3 57 (H) 06/24/2023    CREATININE 3 72 (H) 06/23/2023    CREATININE 2 18 (H) 05/12/2023     · CKD IV with baseline creatinine 2 5 - 3 0 on outpatient Nephrology notes  · Presents with creatinine of 3 7 from recent 2 18 (1 month ago)  · KARINA on CKD: consideration for component of obstructive nephropathy vs ? cardiorenal   · Maintain victor for accurate I&Os  · Urine lytes pending   · Daily consideration for diuretics given anasarca   · Continue flomax  · Close I&Os  · Daily weights    Chronic combined systolic and diastolic congestive heart failure (HCC)  Assessment & Plan  Wt Readings from Last 3 Encounters:   06/24/23 73 kg (161 lb)   05/11/23 66 7 kg (147 lb)   03/23/23 68 9 kg (152 lb)       · Echo 4/1/23 with EF 40-45%, grade II diastolic dysfunction, moderate TR, moderate chronic pericardial effusion   · Weight is up 6KG from previous (1 month ago)  ·   · 6/24 Echo: EF 45%, PA pressures 40-45mmHg  Large pericardial effusion circumfrential to the heart, increased from previous  No evidence of tamponade   · Hold further fluid administration  · Daily consideration for diuretics  · Close I&Os  · Daily weights      Hypothermia  Assessment & Plan  · Temp of 91 degrees on presentation   · Hypothermia likely secondary to sepsis  · yola hugger applied  · Continue to slowly rewarm  · Closely trend vitals and electrolytes    Diarrhea  Assessment & Plan  · Presents with 4 days of diarrhea  Patient's son denies fever/chills, nausea or vomiting  · Check c   Diff, stool O&P  · CT without evidence of acute intraabdominal pathology   · Monitor stool output      Hyponatremia  Assessment & Plan  · Presented with a sodium of 127   · Unclear fluid status, patient with diarrhea x 4 days so concern for hypovolemia however, pedal edema present on physical exam with 6K weight gain   · Given 2L of NSS in the ED  · Serum osmo 306  · Urine osmo 329  · Urine sodium 22 (in the setting of home torsemide)  · Sodium increased to 129    Plan:   · Trend BMPs Q12  · Hold home torsemide for now   · Close I&Os  · Daily weights     Right lower lobe consolidation [facility])  Assessment & Plan  · Concern for right lower lobe pneumonia with moderate effusion    · Patient is tachypneic but not in distress, currently saturating well on room air  · S/p IR right chest tube placement on 6/24 with return of small amount of serous fluid  · Pleural fluid culture pending  · Pleural WBC 15, neutrophil  6  · Pleural pH, glucose,LDH pending   · Continue cefepime, day #2  · Doubtful that we will be able to obtain a sputum cx at this time  · Strep pneumo/legionella urine antigens negative  · Pulmonary hygiene   · Swallow study when appropriate     Thrombocytopenia (HCC)  Assessment & Plan  · Acute on chronic thrombocytopenia  · Platelets 60G on admission   · Recent baseline 90-120s  · Continue to trend platelets      Pericardial effusion  Assessment & Plan  · Echo in February with moderate pericardial effusion that appeared to be chronic in nature  · CT chest with evidence of large pericardial effusion on admission  · 6/24 Echo EF 45%, PA pressures 40-45mmHg  Large pericardial effusion circumfrential to the heart, increased from previous  Measures 3cm   · Hold home eliquis until definitive plan for pericardial effusion  · Given vitamin K and Kcentra 6/24 given elevated INR in the setting of eliquis  · Closely monitor for signs of pericardial tamponade, currently warm and well perfused   Maintaining on low dose levophed    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 [facility])  Assessment & Plan  Lab Results   Component Value Date    HGBA1C 8 3 (H) 02/23/2023       Recent Labs     06/24/23  1119 06/24/23  1542 06/24/23  1545 06/24/23  1754   POCGLU 127 69 71 95       Blood Sugar Average: Last 72 hrs:  (P) 160 [id number]     · PTA meds: amaryl, januvia  · Hold home meds  · Start SSI   · Goal BG <180    Essential hypertension  Assessment & Plan  · PTA meds: coreg, imdur   · Hold home antihypertensive medications for now in the setting of hypotension              ICU Core Measures     A: Assess, Prevent, and Manage Pain · Has pain been assessed? Yes  · Need for changes to pain regimen? No   B: Both SAT/SAT  · N/A   C: Choice of Sedation · RASS Goal: N/A patient not on sedation  · Need for changes to sedation or analgesia regimen? NA   D: Delirium · CAM-ICU: Negative   E: Early Mobility  · Plan for early mobility? Yes   F: Family Engagement · Plan for family engagement today? Yes       Antibiotic Review: Awaiting culture results  Review of Invasive Devices: Kiah Plan: Continue for accurate I/O monitoring for 48 hours        Prophylaxis:  VTE VTE covered by:    Agusto Fleischer       Stress Ulcer  not ordered       Subjective   HPI/24hr events: IR placed 10F right chest tube for pleural effusion, labs preliminarily appear to be transudative  He remains on low dose levophed  Temp improved but continues to require [employer]  HR improved, now Afib in the 60s  Echo performed which demonstrated large pericardial effusion  Vitamin K, kcentra given secondary to eliquis use and possible procedure  Hypoglycemic x 2 overnight, given D50 and started on D5W         Review of Systems     Objective                            Vitals I/O      Most Recent Min/Max in 24hrs   Temp 98 1 °F (36 7 °C) Temp  Min: 92 7 °F (33 7 °C)  Max: 98 2 °F (36 8 °C)   Pulse 60 Pulse  Min: 49  Max: 83   Resp 18 Resp  Min: 9  Max: 33   BP (!) 94/46 BP  Min: 81/45  Max: 137/60   O2 Sat 96 % SpO2  Min: 94 %  Max: 99 %      Intake/Output Summary (Last 24 hours) at 6/25/2023 0024  Last data filed at 6/25/2023 0000  Gross per 24 hour   Intake 1060 72 ml   Output 1855 ml   Net -794 28 ml         Diet NPO; Sips with meds     Invasive Monitoring Physical exam    Physical Exam  Constitutional: General: He is not in acute distress  Appearance: He is not toxic-appearing  Comments: Chronically ill appearing    HENT:      Head: Normocephalic and atraumatic  Mouth/Throat:      Mouth: Mucous membranes are dry  Pharynx: Oropharynx is clear  Comments: Moist cough  Eyes:      Extraocular Movements: Extraocular movements intact  Pupils: Pupils are equal, round, and reactive to light  Cardiovascular:      Rate and Rhythm: Normal rate and regular rhythm  Pulses: Normal pulses  Heart sounds: Normal heart sounds  Pulmonary:      Effort: Prolonged expiration present  Breath sounds: Decreased air movement present  Decreased breath sounds and wheezing present  No rhonchi  Abdominal:      General: Bowel sounds are normal  There is distension  Tenderness: There is no abdominal tenderness  Musculoskeletal:      Right lower leg: Edema present  Left lower leg: Edema present  Comments: +1 lower extremity edema   Skin:     General: Skin is warm and dry  Capillary Refill: Capillary refill takes less than 2 seconds  Neurological:      Mental Status: He is alert  GCS: GCS eye subscore is 4  GCS verbal subscore is 4  GCS motor subscore is 6  Motor: Weakness present  Comments: Answers yes and no questions  Simple one word answers only             Diagnostic Studies      EKG: Afib  Imaging:  I have personally reviewed pertinent reports     and I have personally reviewed pertinent films in PACS     Medications:  Scheduled PRN   ascorbic acid, 500 mg, Daily  atorvastatin, 40 mg, Daily With Dinner  cefepime, 1,000 mg, Q12H  chlorhexidine, 15 mL, Q12H [address]  cholecalciferol, 2,000 Units, Daily  insulin lispro, 1-6 Units, Q6H [address]  latanoprost, 1 drop, HS  tamsulosin, 0 4 mg, Daily With Dinner  timolol, 1 drop, Daily      pneumococcal 20-guillermina conj vacc, 0 5 mL, Prior to discharge       Continuous    norepinephrine, 1-30 mcg/min, Last Rate: 2 mcg/min (06/24/23 2315)         Labs:    CBC    Recent Labs     06/23/23 2153 06/24/23  0615   WBC 3 64* 3 01*   HGB 10 7* 10 0*   HCT 32 5* 30 4*   PLT 66* 58*   BANDSPCT 13*  --      BMP    Recent Labs     06/24/23 0615 06/24/23  2200   SODIUM 129* 127*   K 4 2 3 7   CL 99 100   CO2 23 23   AGAP 7 4   BUN 89* 85*   CREATININE 3 57* 3 54*   CALCIUM 7 1* 6 8*       Coags    Recent Labs     06/24/23  0615   INR 2 15*        Additional Electrolytes  Recent Labs     06/23/23 2153 06/24/23  0615   MG 2 5 2 2   PHOS  --  4 3*          Blood Gas    No recent results  No recent results LFTs  Recent Labs     06/23/23 2153 06/24/23  0615   ALT 25 22   AST 21 18   ALKPHOS 122* 113*   ALB 3 2* 2 8*   TBILI 0 62 0 54       Infectious  No recent results  Glucose  Recent Labs     06/23/23 2153 06/24/23  0615 06/24/23  2200 06/24/23  2327   GLUC 202* 194* 241* 48*               Critical Care Time Delivered: Upon my evaluation, this patient had a high probability of imminent or life-threatening deterioration due to septic shock , which required my direct attention, intervention, and personal management  I have personally provided 30 minutes of critical care time, exclusive of procedures, teaching, family meetings, and any prior time recorded by providers other than myself       Bassem Rosenberg

## 2023-06-25 NOTE — ASSESSMENT & PLAN NOTE
· Presented with a sodium of 127   · Unclear fluid status, patient with diarrhea x 4 days so concern for hypovolemia however, pedal edema present on physical exam with 6K weight gain   · Given 2L of NSS in the ED  · Serum osmo 306  · Urine osmo 329  · Urine sodium 22 (in the setting of home torsemide)  · Sodium increased to 129    Plan:   · Trend BMPs Q12  · Hold home torsemide for now   · Close I&Os  · Daily weights

## 2023-06-25 NOTE — ASSESSMENT & PLAN NOTE
· Acute on chronic thrombocytopenia  · Platelets 41F on admission   · Recent baseline 90-120s  · Continue to trend platelets

## 2023-06-25 NOTE — ASSESSMENT & PLAN NOTE
Wt Readings from Last 3 Encounters:   06/24/23 73 kg (161 lb)   05/11/23 66 7 kg (147 lb)   03/23/23 68 9 kg (152 lb)       · Echo 4/1/23 with EF 40-45%, grade II diastolic dysfunction, moderate TR, moderate chronic pericardial effusion   · Weight is up 6KG from previous (1 month ago)  ·   · 6/24 Echo: EF 45%, PA pressures 40-45mmHg  Large pericardial effusion circumfrential to the heart, increased from previous   No evidence of tamponade   · Hold further fluid administration  · Daily consideration for diuretics  · Close I&Os  · Daily weights

## 2023-06-25 NOTE — ASSESSMENT & PLAN NOTE
· Echo in February with moderate pericardial effusion that appeared to be chronic in nature  · CT chest with evidence of large pericardial effusion on admission  · 6/24 Echo EF 45%, PA pressures 40-45mmHg  Large pericardial effusion circumfrential to the heart, increased from previous  Measures 3cm   · Hold home eliquis until definitive plan for pericardial effusion  · Given vitamin K and Kcentra 6/24 given elevated INR in the setting of eliquis  · Closely monitor for signs of pericardial tamponade, currently warm and well perfused   Maintaining on low dose levophed

## 2023-06-25 NOTE — QUICK NOTE
Update given to son at the bedside by Dr Percy Deleon  All questions answered  Living will was scanned in the chart

## 2023-06-25 NOTE — ASSESSMENT & PLAN NOTE
· Chronic Afib, HR 40-60s on arrival  · On dose reduced eliquis of 2 5mg BID, hold for now pending pericardial fluid assessment   · Slow ventricular response secondary to hypothermia vs possible BRASH syndrome  · HR now improved to 60s with rewarming   · Continue to hold coreg due to hypotension   · Closely monitor HR on telemetry  · Correct electrolytes

## 2023-06-26 ENCOUNTER — APPOINTMENT (INPATIENT)
Dept: RADIOLOGY | Facility: HOSPITAL | Age: 83
DRG: 871 | End: 2023-06-26
Payer: MEDICARE

## 2023-06-26 PROBLEM — I31.39 PERICARDIAL EFFUSION: Chronic | Status: ACTIVE | Noted: 2019-01-13

## 2023-06-26 PROBLEM — I50.42 CHRONIC COMBINED SYSTOLIC AND DIASTOLIC CONGESTIVE HEART FAILURE (HCC): Chronic | Status: ACTIVE | Noted: 2019-01-29

## 2023-06-26 PROBLEM — I12.9 TYPE 2 DIABETES MELLITUS WITH STAGE 4 CHRONIC KIDNEY DISEASE AND HYPERTENSION (HCC): Chronic | Status: ACTIVE | Noted: 2019-01-10

## 2023-06-26 PROBLEM — J86.9 EMPYEMA OF LUNG (HCC): Status: ACTIVE | Noted: 2023-06-26

## 2023-06-26 PROBLEM — N18.4 TYPE 2 DIABETES MELLITUS WITH STAGE 4 CHRONIC KIDNEY DISEASE AND HYPERTENSION (HCC): Chronic | Status: ACTIVE | Noted: 2019-01-10

## 2023-06-26 PROBLEM — E11.22 TYPE 2 DIABETES MELLITUS WITH STAGE 4 CHRONIC KIDNEY DISEASE AND HYPERTENSION (HCC): Chronic | Status: ACTIVE | Noted: 2019-01-10

## 2023-06-26 PROBLEM — I10 ESSENTIAL HYPERTENSION: Chronic | Status: ACTIVE | Noted: 2018-12-19

## 2023-06-26 PROBLEM — I48.91 ATRIAL FIBRILLATION WITH SLOW VENTRICULAR RESPONSE (HCC): Chronic | Status: ACTIVE | Noted: 2021-01-05

## 2023-06-26 LAB
ANION GAP SERPL CALCULATED.3IONS-SCNC: 10 MMOL/L
ATRIAL RATE: 35 BPM
BASOPHILS # BLD AUTO: 0.01 THOUSANDS/ÂΜL (ref 0–0.1)
BASOPHILS NFR BLD AUTO: 0 % (ref 0–1)
BUN SERPL-MCNC: 83 MG/DL (ref 5–25)
CALCIUM SERPL-MCNC: 7.6 MG/DL (ref 8.4–10.2)
CHLORIDE SERPL-SCNC: 103 MMOL/L (ref 96–108)
CO2 SERPL-SCNC: 21 MMOL/L (ref 21–32)
CREAT SERPL-MCNC: 3.38 MG/DL (ref 0.6–1.3)
EOSINOPHIL # BLD AUTO: 0.02 THOUSAND/ÂΜL (ref 0–0.61)
EOSINOPHIL NFR BLD AUTO: 0 % (ref 0–6)
ERYTHROCYTE [DISTWIDTH] IN BLOOD BY AUTOMATED COUNT: 15.8 % (ref 11.6–15.1)
GFR SERPL CREATININE-BSD FRML MDRD: 15 ML/MIN/1.73SQ M
GLUCOSE SERPL-MCNC: 121 MG/DL (ref 65–140)
GLUCOSE SERPL-MCNC: 126 MG/DL (ref 65–140)
GLUCOSE SERPL-MCNC: 135 MG/DL (ref 65–140)
GLUCOSE SERPL-MCNC: 152 MG/DL (ref 65–140)
GLUCOSE SERPL-MCNC: 188 MG/DL (ref 65–140)
GLUCOSE SERPL-MCNC: 222 MG/DL (ref 65–140)
GLUCOSE SERPL-MCNC: 246 MG/DL (ref 65–140)
GLUCOSE SERPL-MCNC: 308 MG/DL (ref 65–140)
GLUCOSE SERPL-MCNC: 31 MG/DL (ref 65–140)
GLUCOSE SERPL-MCNC: 36 MG/DL (ref 65–140)
GLUCOSE SERPL-MCNC: 42 MG/DL (ref 65–140)
GLUCOSE SERPL-MCNC: 97 MG/DL (ref 65–140)
HCT VFR BLD AUTO: 31.7 % (ref 36.5–49.3)
HGB BLD-MCNC: 10.6 G/DL (ref 12–17)
IMM GRANULOCYTES # BLD AUTO: 0.06 THOUSAND/UL (ref 0–0.2)
IMM GRANULOCYTES NFR BLD AUTO: 1 % (ref 0–2)
INR PPP: 1.48 (ref 0.84–1.19)
LYMPHOCYTES # BLD AUTO: 1.61 THOUSANDS/ÂΜL (ref 0.6–4.47)
LYMPHOCYTES NFR BLD AUTO: 25 % (ref 14–44)
MAGNESIUM SERPL-MCNC: 2.2 MG/DL (ref 1.9–2.7)
MCH RBC QN AUTO: 35 PG (ref 26.8–34.3)
MCHC RBC AUTO-ENTMCNC: 33.4 G/DL (ref 31.4–37.4)
MCV RBC AUTO: 105 FL (ref 82–98)
MONOCYTES # BLD AUTO: 0.98 THOUSAND/ÂΜL (ref 0.17–1.22)
MONOCYTES NFR BLD AUTO: 15 % (ref 4–12)
NEUTROPHILS # BLD AUTO: 3.69 THOUSANDS/ÂΜL (ref 1.85–7.62)
NEUTS SEG NFR BLD AUTO: 59 % (ref 43–75)
NRBC BLD AUTO-RTO: 0 /100 WBCS
PHOSPHATE SERPL-MCNC: 3.6 MG/DL (ref 2.3–4.1)
PLATELET # BLD AUTO: 52 THOUSANDS/UL (ref 149–390)
PLATELET BLD QL SMEAR: ABNORMAL
PMV BLD AUTO: 10.8 FL (ref 8.9–12.7)
POTASSIUM SERPL-SCNC: 4.4 MMOL/L (ref 3.5–5.3)
PROTHROMBIN TIME: 18 SECONDS (ref 11.6–14.5)
QRS AXIS: 1 DEGREES
QRSD INTERVAL: 86 MS
QT INTERVAL: 454 MS
QTC INTERVAL: 413 MS
RBC # BLD AUTO: 3.03 MILLION/UL (ref 3.88–5.62)
RBC MORPH BLD: NORMAL
SODIUM SERPL-SCNC: 134 MMOL/L (ref 135–147)
T WAVE AXIS: 4 DEGREES
VENTRICULAR RATE: 50 BPM
WBC # BLD AUTO: 6.37 THOUSAND/UL (ref 4.31–10.16)

## 2023-06-26 PROCEDURE — 83735 ASSAY OF MAGNESIUM: CPT | Performed by: NURSE PRACTITIONER

## 2023-06-26 PROCEDURE — 82948 REAGENT STRIP/BLOOD GLUCOSE: CPT

## 2023-06-26 PROCEDURE — 85610 PROTHROMBIN TIME: CPT | Performed by: NURSE PRACTITIONER

## 2023-06-26 PROCEDURE — 71045 X-RAY EXAM CHEST 1 VIEW: CPT

## 2023-06-26 PROCEDURE — 94640 AIRWAY INHALATION TREATMENT: CPT

## 2023-06-26 PROCEDURE — 85025 COMPLETE CBC W/AUTO DIFF WBC: CPT | Performed by: NURSE PRACTITIONER

## 2023-06-26 PROCEDURE — 94760 N-INVAS EAR/PLS OXIMETRY 1: CPT

## 2023-06-26 PROCEDURE — 84100 ASSAY OF PHOSPHORUS: CPT | Performed by: NURSE PRACTITIONER

## 2023-06-26 PROCEDURE — 80048 BASIC METABOLIC PNL TOTAL CA: CPT | Performed by: NURSE PRACTITIONER

## 2023-06-26 PROCEDURE — 99232 SBSQ HOSP IP/OBS MODERATE 35: CPT | Performed by: ANESTHESIOLOGY

## 2023-06-26 PROCEDURE — 93010 ELECTROCARDIOGRAM REPORT: CPT | Performed by: INTERNAL MEDICINE

## 2023-06-26 RX ORDER — INSULIN LISPRO 100 [IU]/ML
1-5 INJECTION, SOLUTION INTRAVENOUS; SUBCUTANEOUS
Status: DISCONTINUED | OUTPATIENT
Start: 2023-06-26 | End: 2023-06-26

## 2023-06-26 RX ORDER — DEXTROSE MONOHYDRATE 25 G/50ML
INJECTION, SOLUTION INTRAVENOUS
Status: COMPLETED
Start: 2023-06-26 | End: 2023-06-26

## 2023-06-26 RX ORDER — INSULIN LISPRO 100 [IU]/ML
1-6 INJECTION, SOLUTION INTRAVENOUS; SUBCUTANEOUS
Status: DISCONTINUED | OUTPATIENT
Start: 2023-06-26 | End: 2023-06-28

## 2023-06-26 RX ORDER — DEXTROSE MONOHYDRATE 25 G/50ML
25 INJECTION, SOLUTION INTRAVENOUS ONCE
Status: COMPLETED | OUTPATIENT
Start: 2023-06-26 | End: 2023-06-26

## 2023-06-26 RX ADMIN — INSULIN LISPRO 1 UNITS: 100 INJECTION, SOLUTION INTRAVENOUS; SUBCUTANEOUS at 17:18

## 2023-06-26 RX ADMIN — IPRATROPIUM BROMIDE AND ALBUTEROL SULFATE 3 ML: 2.5; .5 SOLUTION RESPIRATORY (INHALATION) at 13:33

## 2023-06-26 RX ADMIN — IPRATROPIUM BROMIDE AND ALBUTEROL SULFATE 3 ML: 2.5; .5 SOLUTION RESPIRATORY (INHALATION) at 01:01

## 2023-06-26 RX ADMIN — Medication 2000 UNITS: at 08:37

## 2023-06-26 RX ADMIN — CEFEPIME HYDROCHLORIDE 1000 MG: 1 INJECTION, SOLUTION INTRAVENOUS at 21:58

## 2023-06-26 RX ADMIN — CARBAMIDE PEROXIDE 6.5% 5 DROP: 6.5 LIQUID AURICULAR (OTIC) at 21:59

## 2023-06-26 RX ADMIN — IPRATROPIUM BROMIDE AND ALBUTEROL SULFATE 3 ML: 2.5; .5 SOLUTION RESPIRATORY (INHALATION) at 07:40

## 2023-06-26 RX ADMIN — ATORVASTATIN CALCIUM 40 MG: 40 TABLET, FILM COATED ORAL at 15:45

## 2023-06-26 RX ADMIN — CHLORHEXIDINE GLUCONATE 0.12% ORAL RINSE 15 ML: 1.2 LIQUID ORAL at 21:58

## 2023-06-26 RX ADMIN — CEFEPIME HYDROCHLORIDE 1000 MG: 1 INJECTION, SOLUTION INTRAVENOUS at 08:36

## 2023-06-26 RX ADMIN — DEXTROSE MONOHYDRATE 25 ML: 25 INJECTION, SOLUTION INTRAVENOUS at 06:33

## 2023-06-26 RX ADMIN — OXYCODONE HYDROCHLORIDE AND ACETAMINOPHEN 500 MG: 500 TABLET ORAL at 08:37

## 2023-06-26 RX ADMIN — IPRATROPIUM BROMIDE AND ALBUTEROL SULFATE 3 ML: 2.5; .5 SOLUTION RESPIRATORY (INHALATION) at 19:29

## 2023-06-26 RX ADMIN — TAMSULOSIN HYDROCHLORIDE 0.4 MG: 0.4 CAPSULE ORAL at 15:45

## 2023-06-26 RX ADMIN — CHLORHEXIDINE GLUCONATE 0.12% ORAL RINSE 15 ML: 1.2 LIQUID ORAL at 08:37

## 2023-06-26 RX ADMIN — LATANOPROST 1 DROP: 50 SOLUTION OPHTHALMIC at 22:34

## 2023-06-26 RX ADMIN — TIMOLOL MALEATE 1 DROP: 5 SOLUTION/ DROPS OPHTHALMIC at 08:37

## 2023-06-26 RX ADMIN — INSULIN LISPRO 3 UNITS: 100 INJECTION, SOLUTION INTRAVENOUS; SUBCUTANEOUS at 13:41

## 2023-06-26 NOTE — ASSESSMENT & PLAN NOTE
· Presented with a sodium of 127   · Unclear fluid status, patient with diarrhea x 4 days so concern for hypovolemia however, pedal edema present on physical exam with 6K weight gain   · Given 2L of NSS in the ED  · Serum osmo 306  · Urine osmo 329  · Urine sodium 22 (in the setting of home torsemide)  · Sodium improving

## 2023-06-26 NOTE — ASSESSMENT & PLAN NOTE
Lab Results   Component Value Date    EGFR 14 06/25/2023    EGFR 14 06/24/2023    EGFR 14 06/24/2023    CREATININE 3 62 (H) 06/25/2023    CREATININE 3 54 (H) 06/24/2023    CREATININE 3 57 (H) 06/24/2023     · CKD IV with baseline creatinine 2 5 - 3 0 on outpatient Nephrology notes  · Presents with creatinine of 3 7 from recent 2 18 (1 month ago)  · KARINA on CKD: consideration for component of obstructive nephropathy vs ? cardiorenal   · Maintain victor for accurate I&Os  · Continue flomax  · Close I&Os  · Daily weights

## 2023-06-26 NOTE — ASSESSMENT & PLAN NOTE
· Patient continues to have frequent episodes of hypoglycemia with unknown origin  · Currently on D10 infusion  · Continue to encourage p o  intake since patient has an appetite  · Trend sugars every 2 hours

## 2023-06-26 NOTE — WOUND OSTOMY CARE
Progress Note - Wound   Hali Dubose 78 y o  male MRN: [id number]  Unit/Bed#: ICU 04 Encounter: [id number]      Assessment: This is an 78year old male patient admitted on 6/23/23 in septic shock  He has a history of AFIB, CKD IV, CHF, DM 2 and HTN  He was awake, alert and received in ICU bed mostly non-verbal  He is totally dependent upon nursing staff for all of his care and was repositioned with assist of 2 persons  He had male external catheter in place with no record of BM  Assessment Findings:  1-Partial thickness skin tear to left lateral hand - wound care orders in place  2-Intact blood blister to left 3rd toe with no drainage - orders for protection in place  3-Full thickness abrasion to left anterior ankle with yellow slough - orders in place for wound care  4-Full thickness venous stasis ulcer to left posterolateral lower leg with pink granulation tissue and yellow slough - orders in place for wound care  5-Full thickness wound to left buttock due to moisture, friction and shear present on admission - orders in place for wound/skin care and for prevention  6-Unstageable pressure injury to mid-sacrum with yellow slough present on admission - orders in place for wound care and for prevention  Plan:   1-Cleanse wound to left lateral hand & left anterior ankle  Open small Dermagran square and apply to wounds in a single layer cut to size of wound  Cover with ABD, cari and tape  Change every other day and prn soilage/dislodgement  2-Cleanse wounds to left posterolateral lower leg with NSS & pat dry  Apply Maxorb Ag+ silver alginate in silver package to wounds cut to size, cover with ABD pad, cari & tap  Change every other day & prn soilage/dislodgement  3-Cleanse blister to left 3rd toe with NSS & pat dry  Paint blister with 3M No Sting daily for protection  4-Cleanse sacrobuttocks with foaming cleanser & pat dry   Apply Calazime to sacrum, buttocks including wound to mid-sacrum & left buttock TID and PRN soilage/dislodgement  5-Hydraguard to bilateral heel BID and PRN  6-Float heels on 2 pillows to offload pressure so heels are not in contact with mattress or pillows  7-Ehob pressure redistribution cushion when out of bed  8-Turn/reposition every 2 hours or when medically stable for pressure re-distribution on skin  9-Moisturize skin daily with skin nourishing cream      Wound 06/24/23 Traumatic Abrasion(s) Ankle Anterior; Left (Active)   Wound Image   06/26/23 1508   Wound Description Yellow;Slough 06/26/23 1508   Ramandeep-wound Assessment Dry; Intact 06/26/23 1508   Wound Length (cm) 0 6 cm 06/26/23 1508   Wound Width (cm) 0 6 cm 06/26/23 1508   Wound Depth (cm) 0 1 cm 06/26/23 1508   Wound Surface Area (cm^2) 0 36 cm^2 06/26/23 1508   Wound Volume (cm^3) 0 036 cm^3 06/26/23 1508   Calculated Wound Volume (cm^3) 0 04 cm^3 06/26/23 1508   Drainage Amount Small 06/26/23 1508   Drainage Description Serosanguineous 06/26/23 1508   Non-staged Wound Description Full thickness 06/26/23 1508   Treatments Cleansed 06/26/23 1508   Dressing Ettie Midget gauze;ABD;Dry dressing 06/26/23 1508   Wound packed? No 06/26/23 1508   Dressing Changed New 06/26/23 1508   Dressing Status Clean;Dry; Intact 06/26/23 1508       Wound 06/24/23 Toe; Diabetic (Comment  which one) Anterior; Left (Active)   Wound Image  Full thickness blood blister 06/26/23 1516   Wound Description Other (Comment) 06/26/23 1516   Pressure Injury Stage Please do not stage 06/26/23 1516   Ramandeep-wound Assessment Intact 06/26/23 1516   Wound Length (cm) 0 3 cm 06/26/23 1516   Wound Width (cm) 0 3 cm 06/26/23 1516   Wound Depth (cm) 0 cm 06/26/23 1516   Wound Surface Area (cm^2) 0 09 cm^2 06/26/23 1516   Wound Volume (cm^3) 0 cm^3 06/26/23 1516   Calculated Wound Volume (cm^3) 0 cm^3 06/26/23 1516   Drainage Amount None 06/26/23 1516   Treatments Cleansed 06/26/23 1516   Dressing Protective barrier (3M No Sting) 06/26/23 1516   Wound packed?  No 06/26/23 Alondra 06/26/23 1516   Dressing Status Intact 06/26/23 1516       Wound 06/24/23 Traumatic Skin tear Hand Left;Lateral (Active)   Wound Image   06/26/23 1517   Wound Description Granulation tissue;Pink 06/26/23 1517   Ramandeep-wound Assessment Purple; Intact 06/26/23 1517   Wound Length (cm) 0 2 cm 06/26/23 1517   Wound Width (cm) 2 5 cm 06/26/23 1517   Wound Depth (cm) 0 1 cm 06/26/23 1517   Wound Surface Area (cm^2) 0 5 cm^2 06/26/23 1517   Wound Volume (cm^3) 0 05 cm^3 06/26/23 1517   Calculated Wound Volume (cm^3) 0 05 cm^3 06/26/23 1517   Drainage Amount Small 06/26/23 1517   Drainage Description Serosanguineous 06/26/23 1517   Non-staged Wound Description Partial thickness 06/26/23 1517   Treatments Cleansed 06/26/23 1517   Dressing Freida Bougie gauze;Gauze;Dry dressing 06/26/23 1517   Dressing Changed New 06/26/23 1517   Dressing Status Clean;Dry; Intact 06/26/23 1517       Wound 06/24/23 Venous Ulcer Tibial Left;Posterior (Active)   Wound Image   06/26/23 1517   Wound Description Granulation tissue;Pink;Slough; Yellow 06/26/23 1517   Ramandeep-wound Assessment Hyperpigmented 06/26/23 1517   Wound Length (cm) 11 cm 06/26/23 1517   Wound Width (cm) 7 cm 06/26/23 1517   Wound Depth (cm) 0 1 cm 06/26/23 1517   Wound Surface Area (cm^2) 77 cm^2 06/26/23 1517   Wound Volume (cm^3) 7 7 cm^3 06/26/23 1517   Calculated Wound Volume (cm^3) 7 7 cm^3 06/26/23 1517   Change in Wound Size % 61 5 06/26/23 1517   Drainage Amount Moderate 06/26/23 1517   Drainage Description Serosanguineous 06/26/23 1517   Non-staged Wound Description Full thickness 06/26/23 1517   Treatments Cleansed 06/26/23 1517   Dressing Calcium Alginate with Silver;ABD;Dry dressing 06/26/23 1517   Wound packed? No 06/26/23 1517   Dressing Changed New 06/26/23 1517   Dressing Status Clean;Dry; Intact 06/26/23 1517       Wound 06/24/23 Pressure Injury Sacrum Mid (Active)   Wound Image  POA 06/26/23 1456   Wound Description Slough; Yellow 06/26/23 1459   Pressure Injury Stage Unstageable 06/26/23 1459   Ramandeep-wound Assessment Erythema; Hyperpigmented 06/26/23 1459   Wound Length (cm) 0 2 cm 06/26/23 1459   Wound Width (cm) 0 3 cm 06/26/23 1459   Wound Depth (cm) 0 2 cm 06/26/23 1459   Wound Surface Area (cm^2) 0 06 cm^2 06/26/23 1459   Wound Volume (cm^3) 0 012 cm^3 06/26/23 1459   Calculated Wound Volume (cm^3) 0 01 cm^3 06/26/23 1459   Change in Wound Size % 80 06/26/23 1459   Drainage Amount None 06/26/23 1459   Treatments Cleansed 06/26/23 1459   Dressing Moisture barrier 06/26/23 1459   Wound packed? No 06/26/23 1459   Dressing Changed New 06/26/23 1459   Dressing Status Intact 06/26/23 1459       Wound 06/24/23 Pressure Injury Buttocks Left (Active)   Wound Image  Full thickness wound d/t moisture, friction, shear POA 06/26/23 1456   Wound Description Slough; Yellow;Granulation tissue;Pink 06/26/23 1456   Pressure Injury Stage Please do not stage  06/26/23 1456   Ramandeep-wound Assessment Hyperpigmented 06/26/23 1456   Wound Length (cm) 0 2 cm 06/26/23 1456   Wound Width (cm) 0 2 cm 06/26/23 1456   Wound Depth (cm) 0 1 cm 06/26/23 1456   Wound Surface Area (cm^2) 0 04 cm^2 06/26/23 1456   Wound Volume (cm^3) 0 004 cm^3 06/26/23 1456   Calculated Wound Volume (cm^3) 0 cm^3 06/26/23 1456   Change in Wound Size % 100 06/26/23 1456   Drainage Amount None 06/26/23 1456   Treatments Cleansed 06/26/23 1456   Dressing Moisture barrier 06/26/23 1456   Wound packed? No 06/26/23 1456   Dressing Changed New 06/26/23 1456   Dressing Status Intact 06/26/23 1456     Discussed assessment findings, and plan of care/recommendations with Caitie Goodman RN  Wound care will follow along with patient throughout admission, please call or tiger text with questions and concerns  Recommendations written as orders    Fabio Palomino MSN, RN, Cheryl Sheikh

## 2023-06-26 NOTE — ASSESSMENT & PLAN NOTE
Lab Results   Component Value Date    HGBA1C 8 3 (H) 02/23/2023       Recent Labs     06/25/23  2323 06/26/23  0612 06/26/23  0620 06/26/23  0704   POCGLU 61* 42* 31* 126       Blood Sugar Average: Last 72 hrs:  (P) 168 [id number]     · PTA meds: amaryl, aparnauvia  · Hold home meds  · Start SSI   · Goal BG <180  · Patient continues with hypoglycemia -continue D10 infusion and every 2 hours sugars  · Continue to encourage oral intake since patient has a appetite

## 2023-06-26 NOTE — ASSESSMENT & PLAN NOTE
· Presents with 4 days of diarrhea   Patient's son denies fever/chills, nausea or vomiting  · No diarrhea since admission  · CT without evidence of acute intraabdominal pathology   · Monitor stool output

## 2023-06-26 NOTE — ASSESSMENT & PLAN NOTE
· Echo in February with moderate pericardial effusion that appeared to be chronic in nature  · CT chest with evidence of large pericardial effusion on admission  · 6/24 Echo EF 45%, PA pressures 40-45mmHg  Large pericardial effusion circumfrential to the heart, increased from previous   Measures 3cm   · Hold home eliquis until definitive plan for pericardial effusion  · Given vitamin K and Kcentra 6/24 given elevated INR in the setting of eliquis

## 2023-06-26 NOTE — DISCHARGE INSTR - OTHER ORDERS
Skin Care Plan:     1-Cleanse wound to left lateral hand & left anterior ankle with wound cleanser & pat dry  Open small Dermagran square and apply to wounds in a single layer cut to size of wound  Cover with ABD, cari and tape  Change every other day and as needed for soilage/dislodgement  2-Cleanse wounds to left posterolateral lower leg with wound cleanser & pat dry  Apply Maxorb Ag+ silver alginate in silver package to wounds cut to size, cover with ABD pad, cari & tap  Change every other day & as needed for soilage/dislodgement  3-Cleanse blister to left 3rd toe with NSS & pat dry  Paint blister with 3M No Sting daily for protection  4-Cleanse sacrobuttocks with foaming cleanser & pat dry  Apply Calazime paste or equivalent zinc paste to sacrum, buttocks including wound to mid-sacrum & left buttock 3x/day and as needed for soilage/dislodgement  5-Hydraguard barrier cream or equivalent to bilateral heel 2x/day and as needed  6-Float heels on 2 pillows to offload pressure so heels are not in contact with mattress or pillows  7-Use pressure redistribution cushion when out of bed  8-Turn/reposition every 2 hrs for pressure re-distribution on skin  9-Moisturize skin daily with skin nourishing cream  10-Follow up at [place] [id number] - call Central Scheduling for appointment: [phone]

## 2023-06-26 NOTE — ASSESSMENT & PLAN NOTE
· Acute on chronic thrombocytopenia  · Platelets 79R on admission   · Recent baseline 90-120s  · Continue to trend platelets

## 2023-06-26 NOTE — CASE MANAGEMENT
Case Management Assessment & Discharge Planning Note    Patient name Luis Alfredo Cosme  Location ICU 04/ICU 89 MRN [id number]  : 1940 Date 2023       Current Admission Date: 2023  Current Admission Diagnosis:Septic shock [facility])   Patient Active Problem List    Diagnosis Date Noted   • Empyema of lung ([facility] ) 2023   • Hypoglycemia 2023   • Thrombocytopenia ([facility] ) 2023   • Diarrhea 2023   • Hypothermia 2023   • Right lower lobe consolidation ([facility] ) 2023   • Septic shock ([facility] ) 2023   • Urinary retention 2023   • Macrocytosis 2023   • Hyponatremia 2023   • Anemia 2023   • Chronic kidney disease-mineral and bone disorder 2023   • Advanced care planning/counseling discussion 2023   • Acute kidney injury superimposed on chronic kidney disease ([facility] ) 2022   • Benign hypertension with CKD (chronic kidney disease) stage IV ([facility]) 2022   • Persistent proteinuria 2022   • COVID-19 2022   • Electrolyte abnormality 2022   • Cellulitis of left upper extremity 2021   • Atrial fibrillation with slow ventricular response ([facility] ) 2021   • Vitamin D deficiency 10/18/2019   • Chronic combined systolic and diastolic congestive heart failure ([facility] ) 2019   • Pericardial effusion 2019   • Leg edema 01/10/2019   • 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 ([facility] ) 01/10/2019   • PVC (premature ventricular contraction) 2018   • Essential hypertension 2018   • Closed traumatic displaced fracture of distal end of left radius with malunion 2018      LOS (days): 3  Geometric Mean LOS (GMLOS) (days): 5 00  Days to GMLOS:2 3     OBJECTIVE:    Risk of Unplanned Readmission Score: 26 63       Current admission status: Inpatient  Referral Reason: Other (Discharge planning)    Preferred Pharmacy:   [address] [facility] [place], [address]  [address] [facility]  Phone: [phone] Fax: [phone]    Primary Care Provider: Thu Jarvis DO    Primary Insurance: MEDICARE  Secondary Insurance: BLUE CROSS    ASSESSMENT:  Active Health Care Proxies    There are no active Health Care Proxies on file  Readmission Root Cause  30 Day Readmission: No    Patient Information  Admitted from[de-identified] Home  Mental Status: Confused  During Assessment patient was accompanied by: Not accompanied during assessment  Assessment information provided by[de-identified] Son  Primary Caregiver: Family  Caregiver's Name[de-identified] Jossie Gregory (son)  Caregiver's Relationship to Patient[de-identified] Family Member  Caregiver's Telephone Number[de-identified] [phone]  Support Systems: Son, Family members  [place] of Residence: [address] [place] [address] do you live in?: [address] [place] [address] Road entry access options   Select all that apply : No steps to enter home  Type of Current Residence: 2 story home  Upon entering residence, is there a bedroom on the main floor (no further steps)?: Yes  Upon entering residence, is there a bathroom on the main floor (no further steps)?: Yes  In the last 12 months, was there a time when you were not able to pay the mortgage or rent on time?: No  In the last 12 months, how many places have you lived?: 1  In the last 12 months, was there a time when you did not have a steady place to sleep or slept in a shelter (including now)?: No  Homeless/housing insecurity resource given?: N/A  Living Arrangements: Lives w/ Son, Lives w/ Extended Family (Son Inga Hunter and sister-in-law Freida Horton live in patient's home)    Activities of Daily Living Prior to Admission  Functional Status: Assistance  Completes ADLs independently?: No  Level of ADL dependence: Assistance  Ambulates independently?: No  Level of ambulatory dependence: Assistance  Does patient use assisted devices?: Yes  Assisted Devices (DME) used: Delmas Juanjo  Does patient currently own DME?: Yes  What DME does the patient currently own?: Taurus Pike  Does patient have a history of Outpatient Therapy (PT/OT)?: No  Does the patient have a history of Short-Term Rehab?: No  Does patient have a history of HHC?: Yes ([facility])  Does patient currently have [facility]?: Yes    Current Home Health Care  Type of Current Home Care Services: Nurse visit  [address][de-identified] Trae Merlos [address] Provider[de-identified] PCP    Patient Information Continued  Income Source: Pension/detention  Does patient have prescription coverage?: Yes  Within the past 12 months, you worried that your food would run out before you got the money to buy more : Never true  Within the past 12 months, the food you bought just didn't last and you didn't have money to get more : Never true  Food insecurity resource given?: N/A  Does patient receive dialysis treatments?: No  Does patient have a history of substance abuse?: No  Does patient have a history of Mental Health Diagnosis?: No    Means of Transportation  [facility] Family transport  In the past 12 months, has lack of transportation kept you from medical appointments or from getting medications?: No  In the past 12 months, has lack of transportation kept you from meetings, work, or from getting things needed for daily living?: No  Was application for public transport provided?: N/A      DISCHARGE DETAILS:    Discharge planning discussed with[de-identified] Son Rondel Bernheim of Choice: Yes     Comments - Freedom of Choice: ALEXANDRA spoke with patient's son Coby Allan by phone to introduce role of CM, conduct assessment and discuss discharge planning  Coby Allan is patient's primary caregiver and patient's sister-in-law Haim Barron also lives in the home  González's preference is for patient to return home at discharge and at this time does not want to consider STR  He did state that a wheelchair would be helpful for patient and ALEXANDRA will place order in [facility]  SW will continue to follow         CM contacted family/caregiver?: Yes     Contacts  Patient Contacts: Leonila Tyson (son)  Relationship to Patient[de-identified] Family  Contact Method: Phone  Phone Number: [phone]  Reason/Outcome: Emergency Contact, Discharge Planning, [address]         Is the patient interested in [facility] at discharge?: Yes  [address] requested[de-identified] Nursing, Physical Therapy, Occupational [address] Name[de-identified] [facility] External Referral Reason (only applicable if external HHA name selected): Services not provided in network or near patient location  [address] [place] [address] Provider[de-identified] PCP  Home Health Services Needed[de-identified] Heart Failure Management, Diabetes Management, Evaluate Functional Status and Safety, Gait/ADL Training, Strengthening/Theraputic Exercises to Improve Function  Homebound Criteria Met[de-identified] Requires the Assistance of Another Person for Safe Ambulation or to Leave the Home, Uses an Assist Device (i e  cane, walker, etc)  Supporting Clincal Findings[de-identified] Limited Endurance, Fatigues Easliy in [employer], Cognitive Deficit Requiring the Assistance of Others    DME Referral Provided  Referral made for DME?: Yes  DME referral completed for the following items[de-identified] Wheelchair  DME Supplier Name[de-identified] [employer]    Other Referral/Resources/Interventions Provided:  Interventions: HHC, DME    Would you like to participate in our [address] service program?  : No - Declined       Discharge Destination Plan[de-identified] [facility]

## 2023-06-26 NOTE — ASSESSMENT & PLAN NOTE
Sepsis criteria met on admission as evidence by hypothermia (91 degrees), leukopenia, tachypnea and hypotension  · ED course: given 2L crystalloid, cefepime  Started on levophed 3mcg/min  Ruben hugger applied   · Source likely pneumonia/empyema vs diarrhea ? · Imaging:   · CXR concerning for RLL consolidation  · CT C/A/P:  Small right pleural effusion with thickened enhancing periphery which may represent empyema  Dense opacity within the right lower lobe may be due to atelectasis versus pneumonia  Opacity at the right lung base likely due to atelectasis  Large pericardial effusion  Anasarca  Cholelithiasis  Gallbladder wall thickening/fluid may be due to volume overload  Further evaluation with HIDA scan may be obtained if there is clinical concern for cholecystitis  Left inguinal hernia contains a loop of nonobstructed proximal sigmoid colon    · BC 1/2 gram-positive cocci in clusters  · UA unremarkable   · LA 1 7  · Strep pneumo/legionella urine antigens negative  · Continue cefepime day 4  · Trend WBC, fever curve

## 2023-06-26 NOTE — ASSESSMENT & PLAN NOTE
· Concern for right lower lobe pneumonia with moderate effusion    · Patient is tachypneic but not in distress, currently saturating well on room air  · S/p IR right chest tube placement on 6/24 with return of small amount of serous fluid  · Pleural fluid culture pending  · Pleural WBC 15, neutrophil  6  · Pleural pH, glucose,LDH pending   · Continue cefepime, day #4  · Started tPA/dornase on 6/25 -reevaluate daily to continue the need  · Strep pneumo/legionella urine antigens negative  · Pulmonary hygiene

## 2023-06-26 NOTE — PROGRESS NOTES
[facility] 43  Progress Note  Name: Ericka Swartz  MRN: [id number]  Unit/Bed#: ICU 04 I Date of Admission: 6/23/2023   Date of Service: 6/26/2023 I Hospital Day: 3    Assessment/Plan   * Septic shock [facility])  Assessment & Plan  Sepsis criteria met on admission as evidence by hypothermia (91 degrees), leukopenia, tachypnea and hypotension  · ED course: given 2L crystalloid, cefepime  Started on levophed 3mcg/min  Ruben hugger applied   · Source likely pneumonia/empyema vs diarrhea ? · Imaging:   · CXR concerning for RLL consolidation  · CT C/A/P:  Small right pleural effusion with thickened enhancing periphery which may represent empyema  Dense opacity within the right lower lobe may be due to atelectasis versus pneumonia  Opacity at the right lung base likely due to atelectasis  Large pericardial effusion  Anasarca  Cholelithiasis  Gallbladder wall thickening/fluid may be due to volume overload  Further evaluation with HIDA scan may be obtained if there is clinical concern for cholecystitis  Left inguinal hernia contains a loop of nonobstructed proximal sigmoid colon    · BC 1/2 gram-positive cocci in clusters  · UA unremarkable   · LA 1 7  · Strep pneumo/legionella urine antigens negative  · Continue cefepime day 4  · Trend WBC, fever curve     Atrial fibrillation with slow ventricular response (HCC)  Assessment & Plan  · Chronic Afib, HR 40-60s on arrival  · On dose reduced eliquis of 2 5mg BID, hold for now pending pericardial fluid assessment   · Slow ventricular response secondary to hypothermia vs possible BRASH syndrome  · HR now improved to 60s with rewarming   · Continue to hold coreg due to hypotension   · Closely monitor HR on telemetry  · Correct electrolytes    Acute kidney injury superimposed on chronic kidney disease [facility])  Assessment & Plan  Lab Results   Component Value Date    EGFR 14 06/25/2023    EGFR 14 06/24/2023    EGFR 14 06/24/2023    CREATININE 3 62 (H) 06/25/2023 CREATININE 3 54 (H) 06/24/2023    CREATININE 3 57 (H) 06/24/2023     · CKD IV with baseline creatinine 2 5 - 3 0 on outpatient Nephrology notes  · Presents with creatinine of 3 7 from recent 2 18 (1 month ago)  · KARINA on CKD: consideration for component of obstructive nephropathy vs ? cardiorenal   · Maintain victor for accurate I&Os  · Continue flomax  · Close I&Os  · Daily weights    Chronic combined systolic and diastolic congestive heart failure (HCC)  Assessment & Plan  Wt Readings from Last 3 Encounters:   06/26/23 74 9 kg (165 lb 2 oz)   05/11/23 66 7 kg (147 lb)   03/23/23 68 9 kg (152 lb)       · Echo 4/1/23 with EF 40-45%, grade II diastolic dysfunction, moderate TR, moderate chronic pericardial effusion   · Weight is up 6KG from previous (1 month ago)  ·   · 6/24 Echo: EF 45%, PA pressures 40-45mmHg  Large pericardial effusion circumfrential to the heart, increased from previous  No evidence of tamponade   · Hold further fluid administration  · Daily consideration for diuretics  · Close I&Os  · Daily weights      Hypothermia  Assessment & Plan  · Temp of 91 degrees on presentation   · Hypothermia likely secondary to sepsis  · yola hugger applied  · Continue to slowly rewarm  · Closely trend vitals and electrolytes    Diarrhea  Assessment & Plan  · Presents with 4 days of diarrhea   Patient's son denies fever/chills, nausea or vomiting  · No diarrhea since admission  · CT without evidence of acute intraabdominal pathology   · Monitor stool output      Hyponatremia  Assessment & Plan  · Presented with a sodium of 127   · Unclear fluid status, patient with diarrhea x 4 days so concern for hypovolemia however, pedal edema present on physical exam with 6K weight gain   · Given 2L of NSS in the ED  · Serum osmo 306  · Urine osmo 329  · Urine sodium 22 (in the setting of home torsemide)  · Sodium improving       Hypoglycemia  Assessment & Plan  · Patient continues to have frequent episodes of hypoglycemia with unknown origin  · Currently on D10 infusion  · Continue to encourage p o  intake since patient has an appetite  · Trend sugars every 2 hours    Right lower lobe consolidation ([address] )  Assessment & Plan  · Concern for right lower lobe pneumonia with moderate effusion    · Patient is tachypneic but not in distress, currently saturating well on room air  · S/p IR right chest tube placement on 6/24 with return of small amount of serous fluid  · Pleural fluid culture pending  · Pleural WBC 15, neutrophil  6  · Pleural pH, glucose,LDH pending   · Continue cefepime, day #4  · Started tPA/dornase on 6/25 -reevaluate daily to continue the need  · Strep pneumo/legionella urine antigens negative  · Pulmonary hygiene     Thrombocytopenia (HCC)  Assessment & Plan  · Acute on chronic thrombocytopenia  · Platelets 95L on admission   · Recent baseline 90-120s  · Continue to trend platelets      Pericardial effusion  Assessment & Plan  · Echo in February with moderate pericardial effusion that appeared to be chronic in nature  · CT chest with evidence of large pericardial effusion on admission  · 6/24 Echo EF 45%, PA pressures 40-45mmHg  Large pericardial effusion circumfrential to the heart, increased from previous   Measures 3cm   · Hold home eliquis until definitive plan for pericardial effusion  · Given vitamin K and Kcentra 6/24 given elevated INR in the setting of eliquis      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 [facility])  Assessment & Plan  Lab Results   Component Value Date    HGBA1C 8 3 (H) 02/23/2023       Recent Labs     06/25/23  2323 06/26/23  0612 06/26/23  0620 06/26/23  0704   POCGLU 61* 42* 31* 126       Blood Sugar Average: Last 72 hrs:  (P) 366 [id number]     · PTA meds: amaryl, januvia  · Hold home meds  · Start SSI   · Goal BG <180  · Patient continues with hypoglycemia -continue D10 infusion and every 2 hours sugars  · Continue to encourage oral intake since patient has a appetite    Essential hypertension  Assessment & Plan  · PTA meds: coreg, imdur   · Hold home antihypertensive medications for now in the setting of hypotension              ICU Core Measures     A: Assess, Prevent, and Manage Pain · Has pain been assessed? Yes  · Need for changes to pain regimen? No   B: Both SAT/SAT  · N/A   C: Choice of Sedation · RASS Goal: 0 Alert and Calm  · Need for changes to sedation or analgesia regimen? NA   D: Delirium · CAM-ICU: Negative   E: Early Mobility  · Plan for early mobility? Yes   F: Family Engagement · Plan for family engagement today? Yes       Antibiotic Review: Awaiting culture results  Review of Invasive Devices: Kiah Plan: Continue for accurate I/O monitoring for 48 hours        Prophylaxis:  VTE VTE covered by:    Kylee Humphries       Stress Ulcer  not ordered       Subjective   HPI/24hr events: Patient continues to have hypoglycemia and hypothermia  Overnight, he was hypoglycemic several times  D10 infusion increased from 25 MLS per hour to 50 MLS per hour this morning  Patient Ruben hugger was removed last evening however he continues to be hypothermic this morning with a temp of 94 5  Will reapply the [employer]  Levophed has been off since yesterday afternoon  Patient is alert and oriented and requesting to eat breakfast this morning  Yesterday he ate a ham sandwich without any difficulty  Review of Systems   Constitutional: Positive for fatigue  HENT: Negative  Eyes: Negative  Respiratory: Negative  Cardiovascular: Negative  Gastrointestinal: Negative  Endocrine: Negative  Genitourinary: Negative  Musculoskeletal: Negative  Skin: Negative  Allergic/Immunologic: Negative  Neurological: Positive for weakness  Hematological: Negative  Psychiatric/Behavioral: Negative            Objective                            Vitals I/O      Most Recent Min/Max in 24hrs   Temp 97 5 °F (36 4 °C) Temp  Min: 96 3 °F (35 7 °C)  Max: 99 9 °F (37 7 °C)   Pulse 75 Pulse  Min: 56  Max: 87   Resp 16 Resp  Min: 16  Max: 33   /52 BP  Min: 73/39  Max: 126/60   O2 Sat 99 % SpO2  Min: 97 %  Max: 100 %      Intake/Output Summary (Last 24 hours) at 6/26/2023 3314  Last data filed at 6/26/2023 0600  Gross per 24 hour   Intake 878 97 ml   Output 1785 ml   Net -906 03 ml         Diet Regular; Regular House     Invasive Monitoring Physical exam    Physical Exam  Vitals and nursing note reviewed  Constitutional:       Appearance: He is ill-appearing and toxic-appearing  HENT:      Head: Normocephalic and atraumatic  Comments: Temporal lobe wasting     Right Ear: Tympanic membrane, ear canal and external ear normal       Left Ear: Tympanic membrane, ear canal and external ear normal       Nose: Nose normal       Mouth/Throat:      Mouth: Mucous membranes are dry  Pharynx: Oropharynx is clear  Eyes:      Extraocular Movements: Extraocular movements intact  Conjunctiva/sclera: Conjunctivae normal       Pupils: Pupils are equal, round, and reactive to light  Cardiovascular:      Rate and Rhythm: Rhythm irregular  Pulses: Normal pulses  Heart sounds: Normal heart sounds  Comments: Controlled atrial fibrillation  Pulmonary:      Comments: On room air   bilateral lung sounds diminished  Right chest tube to 20 cm suction, small airleak noted and unchanged  Abdominal:      General: Bowel sounds are normal       Palpations: Abdomen is soft  Genitourinary:     Comments: Urinary catheter  Musculoskeletal:         General: Normal range of motion  Cervical back: Normal range of motion and neck supple  Skin:     General: Skin is warm and dry  Capillary Refill: Capillary refill takes less than 2 seconds  Neurological:      Mental Status: He is oriented to person, place, and time  Mental status is at baseline     Psychiatric:      Comments: Flat affect but able to communicate          Diagnostic Studies      EKG: Atrial fibrillation, controlled rate  Imaging: IR chest tube placement    Result Date: 6/25/2023  Impression: Right pigtail chest tube placement into minimally complex pleural effusion  Yellow fluid obtained and sent for laboratory analysis  _______________________________________________________________ COMPARISON: CT chest 6/24/2023 PROCEDURE DETAILS: Operators: Dr Carlos Tate Anesthesia: Local Medications: 1% lidocaine Contrast: 0 mL of Omnipaque 300 Fluoroscopy time: 0 minutes COMMENTS: The patient was placed in an upright position  A preprocedure timeout was performed per [facility] protocol  Ultrasound evaluation demonstrated a minimally complex right pleural effusion  The right back was prepped and draped in the usual sterile fashion  All elements of maximal sterile barrier technique were followed (cap, mask, sterile gown, sterile gloves, large sterile sheet, hand hygiene, and 2% chlorhexidine for cutaneous antisepsis)  Lidocaine was administered to the skin, and a small skin incision  was made  Under direct ultrasound guidance, an 18 gauge needle was advanced into the pleural fluid  Guidewire was advanced through the needle, needle was removed and a 10 [place] pigtail chest tube was advanced over the wire with pigtail formed in the pleural collection  Tube was then sutured the skin with 2-0 Prolene and connected to Pleur-evac  Sterile dressings were applied  Workstation performed: [id number]     XR chest 1 view portable    Result Date: 6/24/2023  Impression: Enlargement of the cardiac silhouette due to cardiomegaly and pericardial effusion per CT Right base opacity due to loculated right effusion and right lower lobe rounded atelectasis per CT  Workstation performed: [id number]     CT chest abdomen pelvis wo contrast    Result Date: 6/24/2023  Impression: 1  Small right pleural effusion with thickened enhancing periphery which may represent empyema   Dense opacity within the right lower lobe may be due to atelectasis versus pneumonia  Opacity at the right lung base likely due to atelectasis  2   Large pericardial effusion  Anasarca  3   Cholelithiasis  Gallbladder wall thickening/fluid may be due to volume overload  Further evaluation with HIDA scan may be obtained if there is clinical concern for cholecystitis  4   Left inguinal hernia contains a loop of nonobstructed proximal sigmoid colon  5   Other findings as above  The study was marked in [facility] for immediate notification  Workstation performed: [id number]  I have personally reviewed pertinent reports         Medications:  Scheduled PRN   ascorbic acid, 500 mg, Daily  atorvastatin, 40 mg, Daily With Dinner  cefepime, 1,000 mg, Q12H  chlorhexidine, 15 mL, Q12H [address]  cholecalciferol, 2,000 Units, Daily  ipratropium-albuterol, 3 mL, Q6H  latanoprost, 1 drop, HS  tamsulosin, 0 4 mg, Daily With Dinner  timolol, 1 drop, Daily      pneumococcal 20-guillermina conj vacc, 0 5 mL, Prior to discharge       Continuous    dextrose, 50 mL/hr, Last Rate: 50 mL/hr (06/26/23 0632)         Labs:    CBC    Recent Labs     06/25/23  0559   WBC 4 24*   HGB 10 1*   HCT 30 3*   PLT 58*     BMP    Recent Labs     06/24/23  2200 06/25/23  0559   SODIUM 127* 134*   K 3 7 3 8    102   CO2 23 25   AGAP 4 7   BUN 85* 85*   CREATININE 3 54* 3 62*   CALCIUM 6 8* 7 5*       Coags    No recent results     Additional Electrolytes  Recent Labs     06/25/23  0559   MG 2 2   PHOS 4 2*          Blood Gas    No recent results  No recent results LFTs  Recent Labs     06/25/23  0559   ALT 23   AST 20   ALKPHOS 116*   ALB 2 8*   TBILI 0 59       Infectious  No recent results  Glucose  Recent Labs     06/24/23  2327 06/25/23  0352 06/25/23  0559 06/25/23  0935   GLUC 48* 29* 84 [address] Eliezer Ahuja

## 2023-06-26 NOTE — ASSESSMENT & PLAN NOTE
Wt Readings from Last 3 Encounters:   06/26/23 74 9 kg (165 lb 2 oz)   05/11/23 66 7 kg (147 lb)   03/23/23 68 9 kg (152 lb)       · Echo 4/1/23 with EF 40-45%, grade II diastolic dysfunction, moderate TR, moderate chronic pericardial effusion   · Weight is up 6KG from previous (1 month ago)  ·   · 6/24 Echo: EF 45%, PA pressures 40-45mmHg  Large pericardial effusion circumfrential to the heart, increased from previous   No evidence of tamponade   · Hold further fluid administration  · Daily consideration for diuretics  · Close I&Os  · Daily weights

## 2023-06-27 LAB
ALL TARGETS: NOT DETECTED
ANION GAP SERPL CALCULATED.3IONS-SCNC: 7 MMOL/L
BACTERIA BLD CULT: ABNORMAL
BUN SERPL-MCNC: 76 MG/DL (ref 5–25)
CALCIUM SERPL-MCNC: 7.3 MG/DL (ref 8.4–10.2)
CHLORIDE SERPL-SCNC: 105 MMOL/L (ref 96–108)
CO2 SERPL-SCNC: 23 MMOL/L (ref 21–32)
CREAT SERPL-MCNC: 3.05 MG/DL (ref 0.6–1.3)
ERYTHROCYTE [DISTWIDTH] IN BLOOD BY AUTOMATED COUNT: 15.5 % (ref 11.6–15.1)
GFR SERPL CREATININE-BSD FRML MDRD: 17 ML/MIN/1.73SQ M
GLUCOSE SERPL-MCNC: 155 MG/DL (ref 65–140)
GLUCOSE SERPL-MCNC: 199 MG/DL (ref 65–140)
GLUCOSE SERPL-MCNC: 206 MG/DL (ref 65–140)
GLUCOSE SERPL-MCNC: 214 MG/DL (ref 65–140)
GLUCOSE SERPL-MCNC: 235 MG/DL (ref 65–140)
GLUCOSE SERPL-MCNC: 322 MG/DL (ref 65–140)
GLUCOSE SERPL-MCNC: 77 MG/DL (ref 65–140)
GLUCOSE SERPL-MCNC: 86 MG/DL (ref 65–140)
GLUCOSE SERPL-MCNC: 99 MG/DL (ref 65–140)
GRAM STN SPEC: ABNORMAL
HAPTOGLOB SERPL-MCNC: 137 MG/DL (ref 38–329)
HCT VFR BLD AUTO: 29.4 % (ref 36.5–49.3)
HGB BLD-MCNC: 9.7 G/DL (ref 12–17)
LDH FLD L TO P-CCNC: 44 U/L
MAGNESIUM SERPL-MCNC: 2.1 MG/DL (ref 1.9–2.7)
MCH RBC QN AUTO: 34.4 PG (ref 26.8–34.3)
MCHC RBC AUTO-ENTMCNC: 33 G/DL (ref 31.4–37.4)
MCV RBC AUTO: 104 FL (ref 82–98)
PHOSPHATE SERPL-MCNC: 3.5 MG/DL (ref 2.3–4.1)
PLATELET # BLD AUTO: 48 THOUSANDS/UL (ref 149–390)
PMV BLD AUTO: 10.8 FL (ref 8.9–12.7)
POTASSIUM SERPL-SCNC: 4.5 MMOL/L (ref 3.5–5.3)
PROT FLD-MCNC: <2 G/DL
RBC # BLD AUTO: 2.82 MILLION/UL (ref 3.88–5.62)
SODIUM SERPL-SCNC: 135 MMOL/L (ref 135–147)
WBC # BLD AUTO: 5.08 THOUSAND/UL (ref 4.31–10.16)

## 2023-06-27 PROCEDURE — 99223 1ST HOSP IP/OBS HIGH 75: CPT | Performed by: INTERNAL MEDICINE

## 2023-06-27 PROCEDURE — 88112 CYTOPATH CELL ENHANCE TECH: CPT | Performed by: PATHOLOGY

## 2023-06-27 PROCEDURE — 88305 TISSUE EXAM BY PATHOLOGIST: CPT | Performed by: PATHOLOGY

## 2023-06-27 PROCEDURE — 94640 AIRWAY INHALATION TREATMENT: CPT

## 2023-06-27 PROCEDURE — 99233 SBSQ HOSP IP/OBS HIGH 50: CPT | Performed by: INTERNAL MEDICINE

## 2023-06-27 PROCEDURE — 83615 LACTATE (LD) (LDH) ENZYME: CPT | Performed by: INTERNAL MEDICINE

## 2023-06-27 PROCEDURE — 83735 ASSAY OF MAGNESIUM: CPT | Performed by: NURSE PRACTITIONER

## 2023-06-27 PROCEDURE — 80048 BASIC METABOLIC PNL TOTAL CA: CPT | Performed by: NURSE PRACTITIONER

## 2023-06-27 PROCEDURE — 94760 N-INVAS EAR/PLS OXIMETRY 1: CPT

## 2023-06-27 PROCEDURE — 82948 REAGENT STRIP/BLOOD GLUCOSE: CPT

## 2023-06-27 PROCEDURE — 84100 ASSAY OF PHOSPHORUS: CPT | Performed by: NURSE PRACTITIONER

## 2023-06-27 PROCEDURE — 84157 ASSAY OF PROTEIN OTHER: CPT | Performed by: INTERNAL MEDICINE

## 2023-06-27 PROCEDURE — 85027 COMPLETE CBC AUTOMATED: CPT | Performed by: NURSE PRACTITIONER

## 2023-06-27 RX ORDER — DOCUSATE SODIUM 100 MG/1
100 CAPSULE, LIQUID FILLED ORAL 2 TIMES DAILY
Status: DISCONTINUED | OUTPATIENT
Start: 2023-06-27 | End: 2023-06-28 | Stop reason: HOSPADM

## 2023-06-27 RX ORDER — SENNOSIDES 8.6 MG
2 TABLET ORAL
Status: DISCONTINUED | OUTPATIENT
Start: 2023-06-27 | End: 2023-06-28 | Stop reason: HOSPADM

## 2023-06-27 RX ORDER — INSULIN GLARGINE 100 [IU]/ML
10 INJECTION, SOLUTION SUBCUTANEOUS EVERY MORNING
Status: DISCONTINUED | OUTPATIENT
Start: 2023-06-28 | End: 2023-06-28 | Stop reason: HOSPADM

## 2023-06-27 RX ORDER — POLYETHYLENE GLYCOL 3350 17 G/17G
17 POWDER, FOR SOLUTION ORAL DAILY
Status: DISCONTINUED | OUTPATIENT
Start: 2023-06-27 | End: 2023-06-28 | Stop reason: HOSPADM

## 2023-06-27 RX ADMIN — INSULIN LISPRO 5 UNITS: 100 INJECTION, SOLUTION INTRAVENOUS; SUBCUTANEOUS at 14:39

## 2023-06-27 RX ADMIN — INSULIN LISPRO 1 UNITS: 100 INJECTION, SOLUTION INTRAVENOUS; SUBCUTANEOUS at 18:22

## 2023-06-27 RX ADMIN — IPRATROPIUM BROMIDE AND ALBUTEROL SULFATE 3 ML: 2.5; .5 SOLUTION RESPIRATORY (INHALATION) at 07:19

## 2023-06-27 RX ADMIN — LATANOPROST 1 DROP: 50 SOLUTION OPHTHALMIC at 21:04

## 2023-06-27 RX ADMIN — OXYCODONE HYDROCHLORIDE AND ACETAMINOPHEN 500 MG: 500 TABLET ORAL at 08:01

## 2023-06-27 RX ADMIN — CEFEPIME HYDROCHLORIDE 1000 MG: 1 INJECTION, SOLUTION INTRAVENOUS at 21:04

## 2023-06-27 RX ADMIN — MAGNESIUM HYDROXIDE 30 ML: 400 SUSPENSION ORAL at 16:47

## 2023-06-27 RX ADMIN — ATORVASTATIN CALCIUM 40 MG: 40 TABLET, FILM COATED ORAL at 16:47

## 2023-06-27 RX ADMIN — POLYETHYLENE GLYCOL 3350 17 G: 17 POWDER, FOR SOLUTION ORAL at 16:47

## 2023-06-27 RX ADMIN — CHLORHEXIDINE GLUCONATE 0.12% ORAL RINSE 15 ML: 1.2 LIQUID ORAL at 21:04

## 2023-06-27 RX ADMIN — IPRATROPIUM BROMIDE AND ALBUTEROL SULFATE 3 ML: 2.5; .5 SOLUTION RESPIRATORY (INHALATION) at 02:11

## 2023-06-27 RX ADMIN — CHLORHEXIDINE GLUCONATE 0.12% ORAL RINSE 15 ML: 1.2 LIQUID ORAL at 08:01

## 2023-06-27 RX ADMIN — IPRATROPIUM BROMIDE AND ALBUTEROL SULFATE 3 ML: 2.5; .5 SOLUTION RESPIRATORY (INHALATION) at 14:35

## 2023-06-27 RX ADMIN — CARBAMIDE PEROXIDE 6.5% 5 DROP: 6.5 LIQUID AURICULAR (OTIC) at 08:06

## 2023-06-27 RX ADMIN — DOCUSATE SODIUM 100 MG: 100 CAPSULE, LIQUID FILLED ORAL at 18:22

## 2023-06-27 RX ADMIN — TIMOLOL MALEATE 1 DROP: 5 SOLUTION/ DROPS OPHTHALMIC at 08:06

## 2023-06-27 RX ADMIN — IPRATROPIUM BROMIDE AND ALBUTEROL SULFATE 3 ML: 2.5; .5 SOLUTION RESPIRATORY (INHALATION) at 20:49

## 2023-06-27 RX ADMIN — CEFEPIME HYDROCHLORIDE 1000 MG: 1 INJECTION, SOLUTION INTRAVENOUS at 08:07

## 2023-06-27 RX ADMIN — SENNOSIDES 17.2 MG: 8.6 TABLET, FILM COATED ORAL at 21:04

## 2023-06-27 RX ADMIN — TAMSULOSIN HYDROCHLORIDE 0.4 MG: 0.4 CAPSULE ORAL at 16:47

## 2023-06-27 RX ADMIN — Medication 2000 UNITS: at 08:01

## 2023-06-27 RX ADMIN — INSULIN LISPRO 2 UNITS: 100 INJECTION, SOLUTION INTRAVENOUS; SUBCUTANEOUS at 09:48

## 2023-06-27 NOTE — ASSESSMENT & PLAN NOTE
Possible pneumonia  Treating the patient with cefepime for now  Continue DuoNeb  Pulmonary on board  Patient has a right-sided chest tube draining serosanguineous fluid  May consider discontinuing the chest tube as per pulmonary recommendations

## 2023-06-27 NOTE — ASSESSMENT & PLAN NOTE
Lab Results   Component Value Date    EGFR 17 06/27/2023    EGFR 15 06/26/2023    EGFR 14 06/25/2023    CREATININE 3 05 (H) 06/27/2023    CREATININE 3 38 (H) 06/26/2023    CREATININE 3 62 (H) 06/25/2023   Patient creatinine on admission was 3 7  Baseline  Is around 2 5-3 0  Creatinine now back to baseline  Consider resuming diuretics soon

## 2023-06-27 NOTE — ASSESSMENT & PLAN NOTE
Lab Results   Component Value Date    HGBA1C 8 3 (H) 02/23/2023       Recent Labs     06/27/23  0709 06/27/23  0945 06/27/23  1140 06/27/23  1357   POCGLU 86 199* 235* 322*       Blood Sugar Average: Last 72 hrs:   (P) 134 [id number]     Accu-Chek now starting to rise again, will start Lantus 10 units every morning, and continue patient on low-dose sliding scale insulin with Accu-Chek 4 times daily

## 2023-06-27 NOTE — CONSULTS
"Consultation - Pulmonary Medicine   Sanket Benton 78 y o  male MRN: [id number]  Unit/Bed#: [address] [place] [address]- Encounter: [id number]      Assessment/Plan:    1  Right sided complicated pleural effusion s/p chest tube   - CT placed by IR on 6/24  - s/p 1 dose of tPA/dornase on 6/25  -Total drainage since placement around 910 mL of serosanguineous fluid  -Patient had about 100 mL of serous output since 6 AM this morning  -If less than 100 drains overnight, consider removal of chest tube tomorrow  -Pleural fluid culture shows no growth  -Negative for carcinoma  -Lymphocytic dominant fluid  - will obtain LDH and total protein from sample    2  Abnormal CT of chest   -Concern for right lower lobe pneumonia with moderate effusion on admission  -Patient met sepsis criteria  -Continue cefepime to complete a total of 7 days, today is day #5/7  -Strep/Legionella negative  - Pulmonary toilet: cough and deep breathe, incentive spirometer, OOB to chair as tolerated    3  Pericardial effusion  -This is chronic but increased on recent echo  -Per discussion with primary team, no plan for aspiration at this time  -Per echo, no evidence of tamponade    4  Chronic combined systolic and diastolic congestive heart failure  - BNP elevated at 141 on admission   -Patient not currently on any diuretic--defer to primary team  -Appears volume overloaded on exam  -Continue strict I&Os and daily weights     5  Pulmonary hypertension  - PASP 40-45 mmHg  - continue strict I&Os and daily weights     Discussed with Dr Malinda Sapp and primary RN  Patient remains stable from a pulmonary standpoint  History of Present Illness   Physician Requesting Consult: Katy Cisneros MD  Reason for Consult / Principal Problem: Empyema of lung  Hx and PE limited by: Dementia  Chief Complaint: \"Can I go home today? \"  HPI: Sanket Benton is a 78 y o   male who presented to [address] with complaints of diarrhea    Patient told me that he " "came in for 'shortness of breath\" however, reviewing the H&P, he was brought in by his son at the recommendation of his visiting nurse for diarrhea  Upon admission, he met sepsis criteria as evidenced by hypothermia, tachypnea, leukopenia, and hypotension  A chest x-ray was completed which showed a right base opacity secondary to loculated right effusion and right lower lobe atelectasis  A CT of chest was completed which showed a right pleural effusion with thickening of the periphery which may represent empyema  It also showed a large pericardial effusion  Pulmonary was consulted for empyema of the lung  Per patient, he denies any COPD or asthma  He has a history of smoking a pipe but quit \"many years ago  \"  He has never had a pleural effusion in the past   IR placed a chest tube on 6/24  He did receive 1 dose of tPA/dornase on 6/25  He has had about 100 cc out of his chest tube since 6 AM this morning  On my inspection, the fluid is serous in color  The patient has no complaints of shortness of breath or pain at the insertion site  He is currently on room air satting 98%  Consults    Review of Systems   Constitutional: Negative for activity change, chills and fever  Respiratory: Negative for cough, chest tightness and shortness of breath  Cardiovascular: Positive for leg swelling  Negative for chest pain  Psychiatric/Behavioral: Positive for confusion  All other systems reviewed and are negative        Historical Information   Past Medical History:   Diagnosis Date   • Atrial fibrillation ([facility] [address] )    • Chronic kidney disease    • Coronary artery disease    • Diabetes mellitus ([facility] [address] )    • Hyperlipidemia    • Hypertension      Past Surgical History:   Procedure Laterality Date   • EYE SURGERY     • IR CHEST TUBE PLACEMENT  6/24/2023   • SKIN CANCER EXCISION     • TONSILLECTOMY       Social History   Social History     Substance and Sexual Activity   Alcohol Use Not Currently   • Alcohol/week: 0 0 " "standard drinks of alcohol    Comment: special occasions     Social History     Substance and Sexual Activity   Drug Use Not Currently     Social History     Tobacco Use   Smoking Status Former   Smokeless Tobacco Former     E-Cigarette/Vaping   • E-Cigarette Use Never User      E-Cigarette/Vaping Substances   • Nicotine No    • THC No    • CBD No    • Flavoring No    • Other No    • Unknown No      Occupational History: retired, used to deliver oil    Family History:   Family History   Problem Relation Age of Onset   • Heart disease Mother    • Diabetes Father    • Vision loss Father    • Cancer Sister    • Diabetes Sister        Meds/Allergies   all current active meds have been reviewed and pertinent pulmonary meds have been reviewed    Allergies   Allergen Reactions   • Elemental Sulfur    • Sulfa Antibiotics        Objective   Vitals: Blood pressure 121/58, pulse 59, temperature (!) 97 °F (36 1 °C), temperature source Temporal, resp  rate 16, height 5' 9\" (1 753 m), weight 70 9 kg (156 lb 4 9 oz), SpO2 97 %  RA,Body mass index is 23 08 kg/m²  Intake/Output Summary (Last 24 hours) at 6/27/2023 1518  Last data filed at 6/27/2023 0901  Gross per 24 hour   Intake 700 ml   Output 1290 ml   Net -590 ml     Invasive Devices     Peripheral Intravenous Line  Duration           Peripheral IV 06/23/23 Left Forearm 3 days    Peripheral IV 06/23/23 Right Forearm 3 days    Long-Dwell Peripheral IV (Midline) 85/11/26 Right Basilic 2 days          Drain  Duration           Chest Tube Right 10 2 Fr  2 days                Physical Exam  Vitals reviewed  Constitutional:       General: He is not in acute distress  Appearance: He is ill-appearing  He is not toxic-appearing  HENT:      Head: Normocephalic and atraumatic  Nose: Nose normal       Mouth/Throat:      Pharynx: Oropharynx is clear  Eyes:      Conjunctiva/sclera: Conjunctivae normal    Cardiovascular:      Rate and Rhythm: Normal rate  Rhythm irregular   " Heart sounds: Normal heart sounds  Pulmonary:      Effort: Pulmonary effort is normal  No tachypnea, accessory muscle usage, respiratory distress or retractions  Breath sounds: Normal air entry  No stridor or decreased air movement  Examination of the right-middle field reveals decreased breath sounds  Examination of the right-lower field reveals decreased breath sounds  Decreased breath sounds present  No wheezing, rhonchi or rales  Chest:      Chest wall: No tenderness  Abdominal:      Palpations: Abdomen is soft  Musculoskeletal:         General: Swelling (anasarca) present  Cervical back: Normal range of motion  Right lower leg: Edema (2+) present  Left lower leg: Edema (2+) present  Skin:     Coloration: Skin is pale  Neurological:      Mental Status: He is alert  Mental status is at baseline  Psychiatric:         Mood and Affect: Mood normal          Behavior: Behavior normal          Lab Results:   I have personally reviewed pertinent lab results  , CBC:   Lab Results   Component Value Date    WBC 5 08 06/27/2023    HGB 9 7 (L) 06/27/2023    HCT 29 4 (L) 06/27/2023     (H) 06/27/2023    PLT 48 (LL) 06/27/2023    RBC 2 82 (L) 06/27/2023    MCH 34 4 (H) 06/27/2023    MCHC 33 0 06/27/2023    RDW 15 5 (H) 06/27/2023    MPV 10 8 06/27/2023   , CMP:   Lab Results   Component Value Date    SODIUM 135 06/27/2023    K 4 5 06/27/2023     06/27/2023    CO2 23 06/27/2023    BUN 76 (H) 06/27/2023    CREATININE 3 05 (H) 06/27/2023    CALCIUM 7 3 (L) 06/27/2023    EGFR 17 06/27/2023       Lactic Acid: 1 7    Urine Strep: Negative    Urine Legionella: Negative    BNP: 141    Imaging Studies: I have personally reviewed pertinent reports  and I have personally reviewed pertinent films in PACS     6/26 CXR: Right-sided pleural drainage catheter is seen with a decreasing right effusion      6/23 CT chest abdomen pelvis: Small right pleural effusion with thickened enhancing "periphery which may represent empyema  Dense opacity within the right lower lobe may be due to atelectasis versus pneumonia  Opacity at the right lung base likely due to atelectasis  Large pericardial effusion  Anasarca  Cholelithiasis  6/23 CXR: Enlargement of the cardiac silhouette due to cardiomegaly and pericardial effusion  Right base opacity due to lower lobe rounded atelectasis  EKG, Pathology, and Other Studies: I have personally reviewed pertinent reports  Echo 6/24/23: LVEF 45%  Systolic function is mildly reduced  Mild global hypokinesis  Left atrium is severely dilated  Right atrium is mildly dilated  Aortic and mitral valve with mild regurgitation  PA pressure around 40-45 mmHg  large pericardial effusion  As compared to previous study, effusion has increased  No evidence of tamponade  Pulmonary Results (PFTs, PSG): None to review     VTE Prophylaxis: Sequential compression device Rebekah Grimaldo     Code Status: Level 3 - DNAR and DNI    Portions of the record may have been created with voice recognition software  Occasional wrong word or \"sound a like\" substitutions may have occurred due to the inherent limitations of voice recognition software  Read the chart carefully and recognize, using context, where substitutions have occurred    "

## 2023-06-27 NOTE — ASSESSMENT & PLAN NOTE
Possible source of infection was suspected to be parapneumonic effusion  Patient is on cefepime  Patient is on day #4 of cefepime  Pleural fluid culture negative so far

## 2023-06-27 NOTE — CONSULTS
Consultation - Cardiology   [facility]     Codey Valdez 78 y o  male MRN: [id number]  : 1940  Unit/Bed#: [address] [place] [address] Encounter: [id number]      Assessment & Plan   1  Pericardial effusion     - Patient with known history of pericardial effusion  Has been documented since 2019 on TTE   - Currently hemodynamically stable  No evidence of tamponade on most recent HUMA from 2023   - 23 TTE: Pericardium: There is a large pericardial effusion circumferential to the heart  The fluid exhibits no internal echoes  In some views circumferential diameter is around 3 cm  As compared to previous study of  - effusion has increased  No dense of diastolic collapse of RV and right atrial collapse noted consistent with tamponade  - 21 TTE: Pericardium: A moderate to large, free-flowing pericardial effusion was identified circumferential to the heart  The fluid had no internal echoes  There was no evidence of hemodynamic compromise  - 19 TTE: Pericardium: A moderate, free-flowing pericardial effusion was identified  There was no evidence of hemodynamic compromise  - Continue to closely monitor  Please contact Cardiology if patient becomes hemodynamically unstable  - Patient would likely benefit from pericardiocentesis  Will discuss with attending   - Home medication of Eliquis 2 5 mg twice daily on hold pending further evaluation for pericardial effusion  2  Septic shock     - Presented on 2023 for evaluation for diarrhea x 4 days  - Was admitted to ICU for sepsis, on presentation was hypothermic, tachypneic, hypotensive, and noted leukopenia  Transferred to floor on 2023   - 23 blood culture #1: No growth to date  - 23 blood culture #2: Micrococcus luteus  - 23 CT chest/abdomen/pelvis without contrast: Small right pleural effusion with thickened enhancing periphery which may represent empyema   Dense opacity within the right lower lobe may be due to atelectasis versus pneumonia  Opacity at the right lung base likely due to atelectasis  Large pericardial effusion  Anasarca  - Underwent IR placement of right pigtail chest tube on 6/24/23 due to concern for right-sided empyema  - Currently on cefepime   - Per primary team     3  Chronic combined systolic and diastolic heart failure  - Does not appear in acute exacerbation  Patient euvolemic on examination  - 6/23/23 BNP: 141    - 6/23/23 CXR: Enlargement of the cardiac silhouette due to cardiomegaly and pericardial effusion per CT  Right base opacity due to loculated right effusion and right lower lobe rounded atelectasis per CT   - 6/26/23 CXR: Right-sided pleural drainage catheter is seen with a decreasing right effusion  No worsening  residual right effusion/atelectasis seen  No pneumothorax seen  - 6/24/23 TTE: LVEF 45%  Systolic function is mildly reduced  There is mild global hypokinesis  Unable to assess diastolic function due to atrial fibrillation  Mild aortic valve regurgitation  Mild mitral valve regurgitation  Mild tricuspid valve regurgitation  PA pressure around 40 to 45 mmHg  - 4/01/23 TTE: LVEF 40-45%  There was mild diffuse hypokinesis  There was moderate concentric hypertrophy  Features were consistent with a pseudonormal left ventricular filling pattern, with concomitant abnormal relaxation and increased filling pressure (grade 2 diastolic dysfunction)  Mild mitral valve regurgitation  Moderate tricuspid valve regurgitation  Pulmonary artery systolic pressure moderately increased  - Review of home medication notes patient is on Coreg 6 25 mg twice daily, Imdur 30 mg daily, and torsemide 40 mg daily  Home medications held in setting of hypotension  Will consider restarting home medications tomorrow, 6/28/2023, if BP remains stable  - Monitor weight  - Monitor I/Os  - Continue low-sodium diet with 1800 mL fluid restriction    - CHF education      4  Chronic atrial fibrillation     - Patient has history of chronic atrial fibrillation  - 6/23/23 EKG: Atrial fibrillation with slow ventricular response, ventricular rate 50 bpm  PVCs  - [id number] stroke risk score: 5 points, moderate-high risk  - Home medication of Eliquis 2 5 mg twice daily on hold pending further evaluation for pericardial effusion  5  Hypertension     - BP acceptable  - Review of home medication notes patient is on Coreg 6 25 mg twice daily, Imdur 30 mg daily, and torsemide 40 mg daily  Home medications held in setting of hypotension   - Will consider restarting home medications tomorrow, 6/28/2023, if BP remains stable  6   Thrombocytopenia  - 6/27/23 platelets: 48    - Since admission platelet count between 48-66   - Continue to trend platelets  7  CKD, stage IV     - Baseline creatinine 2 5-3   - Care per primary team     8  DMII      - 2/23/23 HgbA1c: 8 3    - Care per primary team   Summary of Recommendations: Thank you for your consultation  Physician Requesting Consult: Rogene Sandifer, MD    Reason for Consult / Principal Problem: pericardial effusion  Inpatient consult to Cardiology  Consult performed by: Yen Mo PA-C  Consult ordered by: Rogene Sandifer, MD          HPI: Vernelle Hamman is a 78y o  year old male with PMHx chronic combined systolic and diastolic heart failure, chronic atrial fibrillation, pericardial effusion (has been documented since 1/11/2019 on TTE), HTN, HLD, CKD stage IV, and DMII who presented on 6/24/2023 for evaluation for diarrhea x 4 days  Was admitted to ICU for sepsis, on presentation was hypothermic, tachypneic, hypotensive, and noted leukopenia  Underwent IR placement of right pigtail chest tube on 6/24/23 due to concern for right-sided empyema  Transferred out of ICU to floor on 6/27/23  Cardiology consulted for pericardial effusion           6/24/23 TTE noted large pericardial effusion circumferential to the heart, fluid exhibits no internal echoes, some view circumferential diameter around 3 cm  As compared to previous study of 2019 -2021 effusion has increased, no dense of diastolic collapse of RV and right atrial collapse noted consistent with tamponade  No evidence of diastolic collapse of RV noted  Patient has had documented pericardial effusion on TTE since January 2019 1/11/19 TTE: LVEF 40 to 40%; grade 2 diastolic dysfunction; pericardium notes moderate, free-flowing pericardial effusion, no evidence of hemodynamic compromise  Able to obtain review of systems as patient is only alert and oriented x2 (person and place, not year/date)         Review of Systems   Unable to perform ROS: Mental status change       Historical Information   Past Medical History:   Diagnosis Date   • Atrial fibrillation ([facility] )    • Chronic kidney disease    • Coronary artery disease    • Diabetes mellitus ([facility] )    • Hyperlipidemia    • Hypertension      Past Surgical History:   Procedure Laterality Date   • EYE SURGERY     • IR CHEST TUBE PLACEMENT  6/24/2023   • SKIN CANCER EXCISION     • TONSILLECTOMY       Social History     Substance and Sexual Activity   Alcohol Use Not Currently   • Alcohol/week: 0 0 standard drinks of alcohol    Comment: special occasions     Social History     Substance and Sexual Activity   Drug Use Not Currently     Social History     Tobacco Use   Smoking Status Former   Smokeless Tobacco Former     Family History:   Family History   Problem Relation Age of Onset   • Heart disease Mother    • Diabetes Father    • Vision loss Father    • Cancer Sister    • Diabetes Sister        Meds/Allergies    PTA meds:    Medications Prior to Admission   Medication   • allopurinol (ZYLOPRIM) 100 mg tablet   • ALPRAZolam (XANAX) 0 5 mg tablet   • ascorbic acid (VITAMIN C) 500 MG tablet   • atorvastatin (LIPITOR) 40 mg tablet   • carvedilol (COREG) 6 25 mg tablet   • cholecalciferol (VITAMIN D3) 1,000 units tablet   • Eliquis 2 5 MG   • glimepiride (AMARYL) 2 mg tablet   • glimepiride (AMARYL) 4 mg tablet   • isosorbide mononitrate (IMDUR) 30 mg 24 hr tablet   • latanoprost (XALATAN) 0 005 % ophthalmic solution   • sitaGLIPtin (JANUVIA) 25 mg tablet   • tamsulosin (FLOMAX) 0 4 mg   • timolol (TIMOPTIC) 0 5 % ophthalmic solution   • Torsemide 40 MG TABS   • ZINC OXIDE PO   • Blood Pressure Monitor NILTON      Allergies   Allergen Reactions   • Elemental Sulfur    • Sulfa Antibiotics        Current Facility-Administered Medications:   •  ascorbic acid (VITAMIN C) tablet 500 mg, 500 mg, Oral, Daily, SUSANA Kumar, 500 mg at 06/27/23 2286  •  atorvastatin (LIPITOR) tablet 40 mg, 40 mg, Oral, Daily With SUSANA Chowdhury, 40 mg at 06/26/23 1545  •  [MAR Hold] carbamide peroxide (DEBROX) 6 5 % otic solution 5 drop, 5 drop, Both Ears, BID, Mariely Crane MD, 5 drop at 06/27/23 0806  •  cefepime (MAXIPIME) IVPB (premix in dextrose) 1,000 mg 50 mL, 1,000 mg, Intravenous, Q12H, SUSANA Kumar, Stopped at 06/27/23 0901  •  chlorhexidine (PERIDEX) 0 12 % oral rinse 15 mL, 15 mL, Mouth/Throat, Q12H [address], SUSANA Kumar, 15 mL at 06/27/23 0801  •  cholecalciferol (VITAMIN D3) tablet 2,000 Units, 2,000 Units, Oral, Daily, SUSANA Kumar, 2,000 Units at 06/27/23 0801  •  docusate sodium (COLACE) capsule 100 mg, 100 mg, Oral, BID, Ling Reyes MD  •  [START ON 6/28/2023] insulin glargine (LANTUS) subcutaneous injection 10 Units 0 1 mL, 10 Units, Subcutaneous, [facility], Ling Reyes MD  •  insulin lispro (HumaLOG) 100 units/mL subcutaneous injection 1-6 Units, 1-6 Units, Subcutaneous, 4 times day, 5 Units at 06/27/23 1439 **AND** Fingerstick Glucose (POCT), , , 4 times day, SUSANA Kumar  •  ipratropium-albuterol (DUO-NEB) 0 5-2 5 mg/3 mL inhalation solution 3 mL, 3 mL, Nebulization, Q6H, SUSANA Kumar, 3 mL at "06/27/23 1435  •  latanoprost (XALATAN) 0 005 % ophthalmic solution 1 drop, 1 drop, Both Eyes, HS, SUSANA Morales, 1 drop at 06/26/23 2234  •  magnesium hydroxide (MILK OF MAGNESIA) oral suspension 30 mL, 30 mL, Oral, Once, Puja Ortega MD  •  pneumococcal 20-guillermina conj vacc (PREVNAR 20) IM Injection 0 5 mL, 0 5 mL, Intramuscular, Prior to discharge, SUSANA Morales  •  polyethylene glycol (MIRALAX) packet 17 g, 17 g, Oral, Daily, Puja Ortega MD  •  senna (SENOKOT) tablet 17 2 mg, 2 tablet, Oral, HS, Marjoriekin Tony Cosme MD  •  tamsulosin (FLOMAX) capsule 0 4 mg, 0 4 mg, Oral, Daily With SUSANA Bunch, 0 4 mg at 06/26/23 1545  •  timolol (TIMOPTIC) 0 5 % ophthalmic solution 1 drop, 1 drop, Both Eyes, Daily, SUSANA Morales, 1 drop at 06/27/23 0806    VTE Pharmacologic Prophylaxis: none  Objective:   Vitals: Blood pressure 121/58, pulse 59, temperature (!) 97 °F (36 1 °C), temperature source Temporal, resp  rate 16, height 5' 9\" (1 753 m), weight 70 9 kg (156 lb 4 9 oz), SpO2 97 %  Body mass index is 23 08 kg/m²    Wt Readings from Last 3 Encounters:   06/27/23 70 9 kg (156 lb 4 9 oz)   05/11/23 66 7 kg (147 lb)   03/23/23 68 9 kg (152 lb)     BP Readings from Last 3 Encounters:   06/27/23 121/58   05/11/23 134/72   03/23/23 110/50     Orthostatic Blood Pressures    Flowsheet Row Most Recent Value   Blood Pressure 121/58 filed at 06/27/2023 5881   Patient Position - Orthostatic VS Lying filed at 06/27/2023 0800          Intake/Output Summary (Last 24 hours) at 6/27/2023 1543  Last data filed at 6/27/2023 0901  Gross per 24 hour   Intake 700 ml   Output 1290 ml   Net -590 ml       Invasive Devices     Peripheral Intravenous Line  Duration           Peripheral IV 06/23/23 Left Forearm 3 days    Peripheral IV 06/23/23 Right Forearm 3 days    Long-Dwell Peripheral IV (Midline) 34/43/05 Right Basilic 2 days          Drain  Duration           Chest Tube " Right 10 2 Fr  2 days                  Physical Exam:   Physical Exam  Vitals reviewed  Constitutional:       General: He is not in acute distress  Appearance: He is ill-appearing  Cardiovascular:      Rate and Rhythm: Normal rate  Rhythm irregular  Pulses: Normal pulses  Heart sounds: Murmur heard  Pulmonary:      Effort: Pulmonary effort is normal  No respiratory distress  Breath sounds: Decreased breath sounds present  Abdominal:      General: Abdomen is flat  There is no distension  Palpations: Abdomen is soft  Musculoskeletal:      Right lower leg: No edema  Left lower leg: No edema  Skin:     Findings: Bruising present  Comments: Noted bilateral lower extremity bruising  Neurological:      Mental Status: He is alert  Comments: Patient is alert and oriented to person and place  Provides incorrect year               Labs:   Troponins:  Results from last 7 days   Lab Units 06/23/23  2357   HSTNI D2 ng/L 0       CBC with diff:   Results from last 7 days   Lab Units 06/27/23  0539 06/26/23  0600 06/25/23  0559 06/24/23  0615 06/23/23  2153   WBC Thousand/uL 5 08 6 37 4 24* 3 01* 3 64*   HEMOGLOBIN g/dL 9 7* 10 6* 10 1* 10 0* 10 7*   HEMATOCRIT % 29 4* 31 7* 30 3* 30 4* 32 5*   MCV fL 104* 105* 105* 105* 105*   PLATELETS Thousands/uL 48* 52* 58* 58* 66*   RBC Million/uL 2 82* 3 03* 2 88* 2 90* 3 09*   MCH pg 34 4* 35 0* 35 1* 34 5* 34 6*   MCHC g/dL 33 0 33 4 33 3 32 9 32 9   RDW % 15 5* 15 8* 15 6* 15 0 15 3*   MPV fL 10 8 10 8 11 3 10 8 11 0   NRBC AUTO /100 WBCs  --  0 0 1  --        CMP:   Results from last 7 days   Lab Units 06/27/23  0539 06/26/23  0600 06/25/23  0935 06/25/23  0559 06/25/23  0352 06/24/23  2327 06/24/23  2200 06/24/23  0615 06/23/23  2153   SODIUM mmol/L 135 134*  --  134*  --   --  127* 129* 127*   POTASSIUM mmol/L 4 5 4 4  --  3 8  --   --  3 7 4 2 4 5   CHLORIDE mmol/L 105 103  --  102  --   --  100 99 93*   CO2 mmol/L 23 21  --  25  -- " --  23 23 23   ANION GAP mmol/L 7 10  --  7  --   --  4 7 11   BUN mg/dL 76* 83*  --  85*  --   --  85* 89* 100*   CREATININE mg/dL 3 05* 3 38*  --  3 62*  --   --  3 54* 3 57* 3 72*   CALCIUM mg/dL 7 3* 7 6*  --  7 5*  --   --  6 8* 7 1* 7 4*   AST U/L  --   --   --  20  --   --   --  18 21   ALT U/L  --   --   --  23  --   --   --  22 25   ALK PHOS U/L  --   --   --  116*  --   --   --  113* 122*   TOTAL PROTEIN g/dL  --   --   --  5 2*  --   --   --  4 8* 5 8*   ALBUMIN g/dL  --   --   --  2 8*  --   --   --  2 8* 3 2*   TOTAL BILIRUBIN mg/dL  --   --   --  0 59  --   --   --  0 54 0 62   EGFR ml/min/1 73sq m 17 15  --  14  --   --  14 14 14   GLUCOSE RANDOM mg/dL 77 36* 66 84 34* 48* 241* 194* 202*       Magnesium:   Results from last 7 days   Lab Units 06/27/23  0539 06/26/23  0600 06/25/23  0559 06/24/23  0615 06/23/23  2153   MAGNESIUM mg/dL 2 1 2 2 2 2 2 2 2 5     Coags:   Results from last 7 days   Lab Units 06/26/23  0600 06/24/23  0615   INR  1 48* 2 15*     TSH:    Results from last 7 days   Lab Units 06/25/23  0559   TSH 3RD GENERATON uIU/mL 4 048     No components found for: \"TSH3\"  Lipid Profile:     Lipid Profile:   Lab Results   Component Value Date    CHOLESTEROL 85 08/20/2021    HDL 37 (A) 08/20/2021    LDLCALC 54 01/15/2019    TRIG 63 08/20/2021     Hgb A1c:     NT-proBNP: No results for input(s): \"NTBNP\" in the last 72 hours  Imaging & Testing     Cardiac testing:       Echo complete with contrast if indicated - 6/24/23   Left Ventricle Left ventricular cavity size is normal  Wall thickness is mildly increased  There is mild concentric hypertrophy  The left ventricular ejection fraction is 45% by visual estimation  Systolic function is mildly reduced  There is mild global hypokinesis  Unable to assess diastolic function due to atrial fibrillation  Interventricular Septum There is excessive respiratory change in interventricular septal motion   Consider cardiac tamponade, mechanical " ventilation or other causes  Right Ventricle Right ventricular cavity size is normal  Systolic function is normal  Wall thickness is increased  Left Atrium The atrium is severely dilated  Right Atrium The atrium is mildly dilated  Aortic Valve The aortic valve is trileaflet  The leaflets are mildly thickened  The leaflets are not calcified  The leaflets exhibit normal mobility  There is mild regurgitation  The aortic valve has no significant stenosis  Mitral Valve There is mild regurgitation  There is no evidence of stenosis  Tricuspid Valve There is mild regurgitation  PA pressure around 40 to 45 mmHg  There is no evidence of stenosis  Pulmonic Valve Unable to assess pulmonic valve regurgitation due to poor Doppler exam  There is no evidence of stenosis  IVC/SVC The inferior vena cava is normal in size  Respirophasic changes were blunted (less than 50% variation)  Pericardium There is a large pericardial effusion circumferential to the heart  The fluid exhibits no internal echoes  In some views circumferential diameter is around 3 cm  As compared to previous study of 2019 -2021 effusion has increased  No dense of diastolic collapse of RV and right atrial collapse noted consistent with tamponade            Results for orders placed during the hospital encounter of 21    Echo complete with contrast if indicated    Narrative  [facility]   [address] [address]  [phone]    Transthoracic Echocardiogram  2D, M-mode, Doppler, and Color Doppler    Study date:  2021    Patient: Radha Bowden  MR number: [id number]  Account number: [de-identified]  : 1940  Age: 78 years  Gender: Male  Status: Outpatient  Location: Echo lab  Height: 69 in  Weight: 156 6 lb  BP: 152/ 80 mmHg    Indications: Chronic Systolic CHF/Leg Edema    Diagnoses: 428 0 - CONGESTIVE HEART FAILURE    Sonographer:  PELON Varela  Primary Physician:  Gay Clemente DO  Referring Physician:  Eduardo Gonsales DO  Group:  Unknown [facility]  Interpreting Physician:  Eduardo Gonsales DO    SUMMARY    LEFT VENTRICLE:  Systolic function was mildly to moderately reduced by visual assessment  Ejection fraction was estimated in the range of 40 % to 45 %  There was mild diffuse hypokinesis  There was moderate concentric hypertrophy  Features were consistent with a pseudonormal left ventricular filling pattern, with concomitant abnormal relaxation and increased filling pressure (grade 2 diastolic dysfunction)  LEFT ATRIUM:  The atrium was mildly dilated  MITRAL VALVE:  There was mild regurgitation  AORTIC VALVE:  There was no evidence for stenosis  There was no regurgitation  TRICUSPID VALVE:  There was moderate regurgitation  Pulmonary artery systolic pressure was moderately increased  PERICARDIUM:  A moderate to large, free-flowing pericardial effusion was identified circumferential to the heart  The fluid had no internal echoes  There was no evidence of hemodynamic compromise  COMPARISONS:  There has been no significant interval change  Pericardial effusion is overall unchanged  There is no evidence of tamponade present  Comparison was made with the previous study of 11-Jan-2019  HISTORY: PRIOR HISTORY: AFIB,CKD,CAD,Diabetes,HLD,HTN,PVC  Pericardial effusion    PROCEDURE: The procedure was performed in the echo lab  This was a routine study  The transthoracic approach was used  The study included complete 2D imaging, M-mode, complete spectral Doppler, and color Doppler  The heart rate was 68 bpm,  at the start of the study  Images were obtained from the parasternal, apical, subcostal, and suprasternal notch acoustic windows  Image quality was adequate  LEFT VENTRICLE: Size was normal  Systolic function was mildly to moderately reduced by visual assessment  Ejection fraction was estimated in the range of 40 % to 45 %   There was mild diffuse hypokinesis  There was moderate concentric  hypertrophy  DOPPLER: Features were consistent with a pseudonormal left ventricular filling pattern, with concomitant abnormal relaxation and increased filling pressure (grade 2 diastolic dysfunction)  RIGHT VENTRICLE: The size was normal  Systolic function was normal  DOPPLER: Systolic pressure was within the normal range  LEFT ATRIUM: The atrium was mildly dilated  RIGHT ATRIUM: Size was normal     MITRAL VALVE: Valve structure was normal  There was normal leaflet separation  No echocardiographic evidence for prolapse  DOPPLER: The transmitral velocity was within the normal range  There was no evidence for stenosis  There was mild  regurgitation  AORTIC VALVE: The valve was trileaflet  Leaflets exhibited normal thickness and normal cuspal separation  DOPPLER: Transaortic velocity was within the normal range  There was no evidence for stenosis  There was no regurgitation  TRICUSPID VALVE: The valve structure was normal  There was normal leaflet separation  DOPPLER: The transtricuspid velocity was within the normal range  There was moderate regurgitation  Pulmonary artery systolic pressure was moderately  increased  Estimated peak PA pressure was 55 mmHg  PULMONIC VALVE: Leaflets exhibited normal thickness, no calcification, and normal cuspal separation  DOPPLER: The transpulmonic velocity was within the normal range  There was mild regurgitation  PERICARDIUM: There was no thickening  A moderate to large, free-flowing pericardial effusion was identified circumferential to the heart  The fluid had no internal echoes  There was no evidence of hemodynamic compromise  AORTA: The root exhibited normal size      PULMONARY ARTERY: The size was normal  The morphology appeared normal     SYSTEM MEASUREMENT TABLES    2D  EF (Teich): 41 74 %  %FS: 20 2 %  Ao Diam: 3 38 cm  EDV(Teich): 75 16 ml  ESV(Teich): 43 78 ml  HR_2Ch_Q: 53 32 bpm  IVSd: 0 99 cm  LA Area: 24 26 cm2  LA Diam: 4 22 cm  LVCO_2Ch_Q: 1 71 L/min  LVEF_2Ch_Q: 44 12 %  LVIDd: 4 12 cm  LVIDs: 3 29 cm  LVLd_2Ch_Q: 6 35 cm  LVLs_2Ch_Q: 5 57 cm  LVOT Diam: 1 87 cm  LVPWd: 1 15 cm  LVSV_2Ch_Q: 32 03 ml  LVVED_2Ch_Q: 72 58 ml  LVVES_2Ch_Q: 40 56 ml  RA Area: 16 3 cm2  RVIDd: 2 53 cm  SV (Teich): 31 37 ml    CW  TR Vmax: 3 42 m/s  TR maxP 89 mmHg    MM  TAPSE: 1 19 cm    PW  E' Lat: 0 11 m/s  E' Sept: 0 08 m/s    [facility] Commission Accredited Echocardiography Laboratory    Prepared and electronically signed by    Michelle Rodriguez DO  Signed 2021 16:42:17    Results for orders placed during the hospital encounter of 01/10/19    NM Myocardial Perfusion Spect (Exercise Induced Stress and/or Rest)    [facility]  [facility] [address]  [address] [facility] [place] [address]  [phone]    Rest/Stress Gated SPECT Myocardial Perfusion Imaging After Regadenoson    Patient: Lola Plunkett  MR number: [id number]  Account number: [de-identified]  : 1940  Age: [de-identified] years  Gender: Male  Status: Inpatient  Location: Stress lab  Height: 69 in  Weight: 166 lb  BP: 187/ 80 mmHg    Allergies: SULFA ANTIBIOTICS, SULFUR    Diagnosis: R06 02 - Shortness of breath    Primary Physician:  Thu Diamond DO  Technician:  Jemma Hermosillo  RN:  VLADIMIR Castorena  Group:  Reji Knee  Report Prepared By[de-identified]  VLADIMIR Castorena  Interpreting Physician:  Allan Barragan MD    INDICATIONS: Evaluation for coronary artery disease  HISTORY: The patient is a [de-identified]year old  male  Chest pain status: no chest pain  Other symptoms: dyspnea and edema  Coronary artery disease risk factors: hypertension, family history of premature coronary artery disease, and  diabetes mellitus  Cardiovascular history: pericardial effusion  PHYSICAL EXAM: Baseline physical exam screening: no wheezes audible  REST ECG: Normal sinus rhythm   The ECG showed occasional premature ventricular contractions  PROCEDURE: The study was performed in the the Stress lab  A regadenoson infusion pharmacologic stress test was performed  Gated SPECT myocardial perfusion imaging was performed after stress and at rest  Systolic blood pressure was 187  mmHg, at the start of the study  Diastolic blood pressure was 80 mmHg, at the start of the study  The heart rate was 73 bpm, at the start of the study  IV double checked  Regadenoson protocol:  Time HR bpm SBP mmHg DBP mmHg Symptoms ST change Rhythm/conduct  Baseline 10:40 72 187 80 none none NSR, frequent PVC's  Immediate 10:48 77 188 72 dizziness -- same as above  1 min 10:49 81 172 72 same as above -- ventricular bigeminy  2 min 10:50 76 151 68 subsiding -- --  3 min 10:51 77 130 70 none -- ventricular bigeminy  4 min 10:52 75 140 70 none -- --  No medications or fluids given  STRESS SUMMARY: Duration of pharmacologic stress was 3 min  Maximal heart rate during stress was 87 bpm  The heart rate response to stress was normal  There was resting hypertension with an appropriate blood pressure response to stress  The rate-pressure product for the peak heart rate and blood pressure was 02769  There was no chest pain during stress  The stress test was terminated due to protocol completion  Pre oxygen saturation: 98 %  Peak oxygen saturation: 98 %  Arrhythmia during stress: isolated premature ventricular beats  ISOTOPE ADMINISTRATION:  Resting isotope administration Stress isotope administration  Agent Tetrofosmin Tetrofosmin  Dose 10 8 mCi 32 6 mCi  Date 01/14/2019 01/14/2019  Injection time 08:30 10:50    The radiopharmaceutical was injected at the peak effect of pharmacologic stress  MYOCARDIAL PERFUSION IMAGING:  The image quality was fair  The left ventricle was mildly dilated  The TID ratio was   9  PERFUSION DEFECTS:  -  There was a moderate-sized, moderately severe, fixed myocardial perfusion defect of the entire inferolateral wall      GATED SPECT:  The calculated left ventricular ejection fraction was 37 %  Left ventricular ejection fraction was moderately decreased by visual estimate  There was moderately reduced myocardial thickening and motion of the lateral wall of the left  ventricle  SUMMARY:  -  Stress results: There was resting hypertension with an appropriate blood pressure response to stress  There was no chest pain during stress  -  Perfusion imaging: There was a moderate-sized, moderately severe, fixed myocardial perfusion defect of the entire inferolateral wall  -  Gated SPECT: The calculated left ventricular ejection fraction was 37 %  Left ventricular ejection fraction was moderately decreased by visual estimate  There was moderately reduced myocardial thickening and motion of the lateral wall  of the left ventricle  -  Impressions and recommendations: Abnormal study after pharmacologic vasodilation with fixed inferolateral wall defect, no clear ischemia  EF 37%  Can not rule out old inferolateral wall infarction  IMPRESSIONS: Abnormal study after pharmacologic vasodilation with fixed inferolateral wall defect, no clear ischemia  EF 37%  Can not rule out old inferolateral wall infarction  There was a moderate-sized infarct  Left ventricular systolic  function was reduced, with regional wall motion abnormalities  Prepared and signed by    Celestine Camarillo MD  Signed 01/14/2019 17:13:54          Imaging: I have personally reviewed pertinent reports  XR chest portable ICU    Result Date: 6/26/2023    Impression: Right-sided pleural drainage catheter is seen with a decreasing right effusion No worsening Residual right effusion/atelectasis seen No pneumothorax seen  IR chest tube placement    Result Date: 6/25/2023    Impression: Right pigtail chest tube placement into minimally complex pleural effusion  Yellow fluid obtained and sent for laboratory analysis   _______________________________________________________________ COMPARISON: CT chest 6/24/2023 PROCEDURE DETAILS: Operators: Dr Carlos Tate Anesthesia: Local Medications: 1% lidocaine Contrast: 0 mL of Omnipaque 300 Fluoroscopy time: 0 minutes COMMENTS: The patient was placed in an upright position  A preprocedure timeout was performed per [facility] protocol  Ultrasound evaluation demonstrated a minimally complex right pleural effusion  The right back was prepped and draped in the usual sterile fashion  All elements of maximal sterile barrier technique were followed (cap, mask, sterile gown, sterile gloves, large sterile sheet, hand hygiene, and 2% chlorhexidine for cutaneous antisepsis)  Lidocaine was administered to the skin, and a small skin incision  was made  Under direct ultrasound guidance, an 18 gauge needle was advanced into the pleural fluid  Guidewire was advanced through the needle, needle was removed and a 10 [place] pigtail chest tube was advanced over the wire with pigtail formed in the pleural collection  Tube was then sutured the skin with 2-0 Prolene and connected to Pleur-evac  Sterile dressings were applied  XR chest 1 view portable    Result Date: 6/24/2023    Impression: Enlargement of the cardiac silhouette due to cardiomegaly and pericardial effusion per CT Right base opacity due to loculated right effusion and right lower lobe rounded atelectasis per CT  CT chest abdomen pelvis wo contrast    Result Date: 6/24/2023    Impression: 1  Small right pleural effusion with thickened enhancing periphery which may represent empyema  Dense opacity within the right lower lobe may be due to atelectasis versus pneumonia  Opacity at the right lung base likely due to atelectasis  2   Large pericardial effusion  Anasarca  3   Cholelithiasis  Gallbladder wall thickening/fluid may be due to volume overload  Further evaluation with HIDA scan may be obtained if there is clinical concern for cholecystitis   4   Left inguinal hernia contains a loop of nonobstructed proximal sigmoid colon  5   Other findings as above  EKG/ Monitor: Personally reviewed                Code Status: Level 3 - DNAR and DNI  Advance Directive and Living Will:      POLST:        Rajeev Grant PA-C

## 2023-06-27 NOTE — PROGRESS NOTES
[facility]  64   Progress Note  Name: Jess Crowder  MRN: [id number]  Unit/Bed#: [address] [address] I Date of Admission: 6/23/2023   Date of Service: 6/27/2023 I Hospital Day: 4    Assessment/Plan   * Septic shock [facility])  Assessment & Plan  Possible source of infection was suspected to be parapneumonic effusion  Patient is on cefepime  Patient is on day #4 of cefepime  Pleural fluid culture negative so far  Pericardial effusion  Assessment & Plan  Patient has a large pericardial effusion  This has increased in size  No signs of tamponade  Patient would benefit from pericardial window  However patient has multiple comorbidities and low platelet count as well  Consult cardiology for further evaluation and recommendations and discussion with the family about pericardial effusion  Thrombocytopenia ([address] )  Assessment & Plan  Possibly from sepsis  Platelet count ranging between 58-48  Acute kidney injury superimposed on chronic kidney disease [facility])  Assessment & Plan  Lab Results   Component Value Date    EGFR 17 06/27/2023    EGFR 15 06/26/2023    EGFR 14 06/25/2023    CREATININE 3 05 (H) 06/27/2023    CREATININE 3 38 (H) 06/26/2023    CREATININE 3 62 (H) 06/25/2023   Patient creatinine on admission was 3 7  Baseline  Is around 2 5-3 0  Creatinine now back to baseline  Consider resuming diuretics soon  Chronic combined systolic and diastolic congestive heart failure (HCC)  Assessment & Plan  Wt Readings from Last 3 Encounters:   06/27/23 70 9 kg (156 lb 4 9 oz)   05/11/23 66 7 kg (147 lb)   03/23/23 68 9 kg (152 lb)     Patient is on carvedilol, Imdur, and torsemide at home  Consulted cardiology for further evaluation, may consider starting diuretics when stable from cardiology standpoint  Currently possible intravascular volume depletion  Poor oral intake  Patient also had KARINA on CKD and just recovering out of it    Patient's blood pressure medications were on hold due to septic shock and being hypotensive  Blood pressure improving  May consider starting beta-blocker tomorrow onwards  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 [facility])  Assessment & Plan  Lab Results   Component Value Date    HGBA1C 8 3 (H) 02/23/2023       Recent Labs     06/27/23  0709 06/27/23  0945 06/27/23  1140 06/27/23  1357   POCGLU 86 199* 235* 322*       Blood Sugar Average: Last 72 hrs:   (P) 134 [id number]     Accu-Chek now starting to rise again, will start Lantus 10 units every morning, and continue patient on low-dose sliding scale insulin with Accu-Chek 4 times daily  Right lower lobe consolidation [facility])  Assessment & Plan  Possible pneumonia  Treating the patient with cefepime for now  Continue DuoNeb  Pulmonary on board  Patient has a right-sided chest tube draining serosanguineous fluid  May consider discontinuing the chest tube as per pulmonary recommendations  Severe constipation: Start patient on senna, docusate, MiraLAX  Give 1 dose of milk of magnesia also today  Pharmacologic VTE Prophylaxis: Yes   Mechanical VTE Prophylaxis in Place: No   Patient Centered Rounds: I have performed bedside rounds with the Nursing staff today  Current Length of Stay: 4 day(s)  Current Patient Status: Inpatient   Code Status: Level 3 - DNAR and DNI  Time Spent for Care:  35 minutes  More than 50% of total time spent on counseling and coordination of care as described above  Discussions with Specialists or Other Care Team Provider: Yes Pulmonary  Education and Discussions with Family / Patient: Yes, Updated family member  Son over the Phone  Discharge Plan: > 48 hrs  Case Discussed with   regarding updating plan of care and disposition planning  Certification Statement: The patient will continue to require additional inpatient hospital stay due to Large pericardial effusion, right-sided chest tube      Subjective:   I have seen and Examined the patient at the bedside  No CP or Sob  No fevers or chills, No nausea or vomiting  Overnight events reviewed with the RN  Seems to be poor historian, however has a flat affect  Denies any orthopnea or PND  Review of System:   Denies any CP or SOB  Denies any Cough or Cold  Denies any Fevers or chills  Denies any focal tingling numbness or weakness in any extremities  Denies any abdominal pain, Nausea or vomiting  Objective:   Temp (24hrs), Av 8 °F (36 6 °C), Min:97 °F (36 1 °C), Max:98 6 °F (37 °C)    Temp:  [97 °F (36 1 °C)-98 6 °F (37 °C)] 97 °F (36 1 °C)  HR:  [59-81] 59  Resp:  [13-24] 16  BP: ()/(53-66) 121/58  SpO2:  [97 %-100 %] 97 %  Body mass index is 23 08 kg/m²  Input and Output Summary (last 24 hours): Intake/Output Summary (Last 24 hours) at 2023 1531  Last data filed at 2023 0901  Gross per 24 hour   Intake 700 ml   Output 1290 ml   Net -590 ml     I/O        0701   0700  0701   0700  0701   0700    P  O  300 1040 240    I V  (mL/kg) 742 3 (9 9) 30 (0 4) 10 (0 1)    Blood       IV Piggyback 150 50     Total Intake(mL/kg) 1192 3 (15 9) 1120 (15 8) 250 (3 5)    Urine (mL/kg/hr) 1355 (0 8) 2150 (1 3)     Chest Tube 430 540     Total Output 1785 2690     Net -592 7 -1570 +250           Unmeasured Urine Occurrence  1 x           Physical Exam:   General : Alert, Awake and oriented x 2-3, NAD  Cachectic and chronically ill looking  Neck : Supple  Eyes:  LORRAINE, EOMI  CVS : S1, S2, RRR  Distant heart sounds  R/S : Clear to auscultate anteriorly  Decreased breath sound the right lung base  Abd: Soft, NT, ND  Bs+ve  Extremity: Trace pedal edema noted  Skin: No acute Rash noted  CNS: No acute FND  Confused slightly      Additional Data:     Labs, Culture & Imaging Data Reviewed:    Results from last 7 days   Lab Units 23  0539   WBC Thousand/uL 5 08   HEMOGLOBIN g/dL 9 7*   HEMATOCRIT % 29 4* PLATELETS Thousands/uL 48*     Results from last 7 days   Lab Units 06/27/23  0539 06/26/23  0600 06/25/23  0559   POTASSIUM mmol/L 4 5   < > 3 8   CHLORIDE mmol/L 105   < > 102   CO2 mmol/L 23   < > 25   BUN mg/dL 76*   < > 85*   CREATININE mg/dL 3 05*   < > 3 62*   CALCIUM mg/dL 7 3*   < > 7 5*   ALK PHOS U/L  --   --  116*   ALT U/L  --   --  23   AST U/L  --   --  20    < > = values in this interval not displayed  Results from last 7 days   Lab Units 06/26/23  0600   INR  1 48*     Lab Results   Component Value Date    HGBA1C 8 3 (H) 02/23/2023      XR chest portable ICU   Final Result by Terrie Deng MD (06/26 1708)      Right-sided pleural drainage catheter is seen with a decreasing right effusion   No worsening   Residual right effusion/atelectasis seen   No pneumothorax seen               Workstation performed: [id number]         IR chest tube placement   Final Result by Neli Campbell DO (06/25 1939)   Right pigtail chest tube placement into minimally complex pleural effusion  Yellow fluid obtained and sent for laboratory analysis  _______________________________________________________________   COMPARISON: CT chest 6/24/2023      PROCEDURE DETAILS:   Operators: Dr Rosa Ruvalcaba   Anesthesia: Local   Medications: 1% lidocaine   Contrast: 0 mL of Omnipaque 300   Fluoroscopy time: 0 minutes      COMMENTS:   The patient was placed in an upright position  A preprocedure timeout was performed per [facility] protocol  Ultrasound evaluation demonstrated a minimally complex right pleural effusion  The right back was prepped and draped in the usual sterile    fashion  All elements of maximal sterile barrier technique were followed (cap, mask, sterile gown, sterile gloves, large sterile sheet, hand hygiene, and 2% chlorhexidine for cutaneous antisepsis)  Lidocaine was administered to the skin, and a small skin incision    was made   Under direct ultrasound guidance, an 18 gauge needle was "advanced into the pleural fluid  Guidewire was advanced through the needle, needle was removed and a 10 Malay pigtail chest tube was advanced over the wire with pigtail formed in the    pleural collection  Tube was then sutured the skin with 2-0 Prolene and connected to Pleur-evac  Sterile dressings were applied  Workstation performed: [id number]         CT chest abdomen pelvis wo contrast   Final Result by Breanna Deluca MD (06/24 0127)      1  Small right pleural effusion with thickened enhancing periphery which may represent empyema  Dense opacity within the right lower lobe may be due to atelectasis versus pneumonia  Opacity at the right lung base likely due to atelectasis  2   Large pericardial effusion  Anasarca  3   Cholelithiasis  Gallbladder wall thickening/fluid may be due to volume overload  Further evaluation with HIDA scan may be obtained if there is clinical concern for cholecystitis  4   Left inguinal hernia contains a loop of nonobstructed proximal sigmoid colon  5   Other findings as above  The study was marked in [facility] for immediate notification  Workstation performed: [id number]         XR chest 1 view portable   Final Result by Sherif Elaine MD (06/24 7289)      Enlargement of the cardiac silhouette due to cardiomegaly and pericardial effusion per CT      Right base opacity due to loculated right effusion and right lower lobe rounded atelectasis per CT                 Workstation performed: [id number]             Cultures:   Blood Culture:   Lab Results   Component Value Date    BLOODCX No Growth at 72 hrs  06/23/2023    BLOODCX Micrococcus luteus (A) 06/23/2023     Urine Culture: No results found for: \"URINECX\"  Sputum Culture: No components found for: \"SPUTUMCX\"  Wound Culture: No results found for: \"WOUNDCULT\"    Last 24 Hours Medication List:   Current Facility-Administered Medications   Medication Dose Route Frequency Provider " Last Rate   • ascorbic acid  500 mg Oral Daily SUSANA Morrissey     • atorvastatin  40 mg Oral Daily With [facility] SUSANA Adan     • [facility] Hold] carbamide peroxide  5 drop Both Ears BID Paige Glass MD     • cefepime  1,000 mg Intravenous Q12H SUSANA Morrissey Stopped (06/27/23 0901)   • chlorhexidine  15 mL Mouth/Throat Q12H [address] SUSANA Morrissey     • cholecalciferol  2,000 Units Oral Daily SUSANA Morrissey     • [START ON 6/28/2023] insulin glargine  10 Units Subcutaneous QAM Paige Glass MD     • insulin lispro  1-6 Units Subcutaneous 4 times day SUSANA Morrissey     • ipratropium-albuterol  3 mL Nebulization Q6H SUSANA Morrissey     • latanoprost  1 drop Both Eyes HS SUSANA Morrissey     • pneumococcal 20-guillermina conj vacc  0 5 mL Intramuscular Prior to discharge SUSANA Morrissey     • tamsulosin  0 4 mg Oral Daily With [address], CRNP     • timolol  1 drop Both Eyes Daily [facility]           Patient is at moderate risk for morbidity and mortality due to above mentioned illness and comorbidities

## 2023-06-27 NOTE — ASSESSMENT & PLAN NOTE
Wt Readings from Last 3 Encounters:   06/27/23 70 9 kg (156 lb 4 9 oz)   05/11/23 66 7 kg (147 lb)   03/23/23 68 9 kg (152 lb)     Patient is on carvedilol, Imdur, and torsemide at home  Consulted cardiology for further evaluation, may consider starting diuretics when stable from cardiology standpoint  Currently possible intravascular volume depletion  Poor oral intake  Patient also had KARINA on CKD and just recovering out of it  Patient's blood pressure medications were on hold due to septic shock and being hypotensive  Blood pressure improving  May consider starting beta-blocker tomorrow onwards

## 2023-06-27 NOTE — ASSESSMENT & PLAN NOTE
Patient has a large pericardial effusion  This has increased in size  No signs of tamponade  Patient would benefit from pericardial window  However patient has multiple comorbidities and low platelet count as well  Consult cardiology for further evaluation and recommendations and discussion with the family about pericardial effusion

## 2023-06-28 ENCOUNTER — HOSPITAL ENCOUNTER (INPATIENT)
Facility: HOSPITAL | Age: 83
LOS: 13 days | Discharge: NURSING, REHAB OR LONG-TERM CARE | DRG: 314 | End: 2023-07-11
Attending: STUDENT IN AN ORGANIZED HEALTH CARE EDUCATION/TRAINING PROGRAM | Admitting: INTERNAL MEDICINE
Payer: MEDICARE

## 2023-06-28 VITALS
WEIGHT: 164.24 LBS | DIASTOLIC BLOOD PRESSURE: 89 MMHG | HEART RATE: 62 BPM | OXYGEN SATURATION: 97 % | SYSTOLIC BLOOD PRESSURE: 152 MMHG | RESPIRATION RATE: 16 BRPM | HEIGHT: 69 IN | BODY MASS INDEX: 24.33 KG/M2 | TEMPERATURE: 97.4 F

## 2023-06-28 DIAGNOSIS — S81.802A OPEN WOUND OF LEFT LOWER EXTREMITY, INITIAL ENCOUNTER: ICD-10-CM

## 2023-06-28 DIAGNOSIS — R60.0 LEG EDEMA: ICD-10-CM

## 2023-06-28 DIAGNOSIS — I49.3 PVC (PREMATURE VENTRICULAR CONTRACTION): ICD-10-CM

## 2023-06-28 DIAGNOSIS — N18.9 CHRONIC KIDNEY DISEASE-MINERAL AND BONE DISORDER: ICD-10-CM

## 2023-06-28 DIAGNOSIS — J86.9 EMPYEMA OF LUNG (HCC): ICD-10-CM

## 2023-06-28 DIAGNOSIS — M89.9 CHRONIC KIDNEY DISEASE-MINERAL AND BONE DISORDER: ICD-10-CM

## 2023-06-28 DIAGNOSIS — E83.9 CHRONIC KIDNEY DISEASE-MINERAL AND BONE DISORDER: ICD-10-CM

## 2023-06-28 DIAGNOSIS — K92.1 MELENA: ICD-10-CM

## 2023-06-28 DIAGNOSIS — I10 ESSENTIAL HYPERTENSION: Chronic | ICD-10-CM

## 2023-06-28 DIAGNOSIS — G93.40 ENCEPHALOPATHY: Chronic | ICD-10-CM

## 2023-06-28 DIAGNOSIS — I31.39 PERICARDIAL EFFUSION: Primary | ICD-10-CM

## 2023-06-28 DIAGNOSIS — K26.9 DUODENAL ULCER: Chronic | ICD-10-CM

## 2023-06-28 LAB
ALBUMIN SERPL BCP-MCNC: 2.7 G/DL (ref 3.5–5)
ALP SERPL-CCNC: 124 U/L (ref 34–104)
ALT SERPL W P-5'-P-CCNC: 54 U/L (ref 7–52)
ANION GAP SERPL CALCULATED.3IONS-SCNC: 5 MMOL/L
AST SERPL W P-5'-P-CCNC: 38 U/L (ref 13–39)
BACTERIA SPEC BFLD CULT: NO GROWTH
BASOPHILS # BLD AUTO: 0.01 THOUSANDS/ÂΜL (ref 0–0.1)
BASOPHILS NFR BLD AUTO: 0 % (ref 0–1)
BILIRUB DIRECT SERPL-MCNC: 0.21 MG/DL (ref 0–0.2)
BILIRUB SERPL-MCNC: 0.84 MG/DL (ref 0.2–1)
BUN SERPL-MCNC: 65 MG/DL (ref 5–25)
CALCIUM SERPL-MCNC: 7.7 MG/DL (ref 8.4–10.2)
CHLORIDE SERPL-SCNC: 102 MMOL/L (ref 96–108)
CO2 SERPL-SCNC: 25 MMOL/L (ref 21–32)
CREAT SERPL-MCNC: 2.75 MG/DL (ref 0.6–1.3)
DME PARACHUTE DELIVERY DATE ACTUAL: NORMAL
DME PARACHUTE DELIVERY DATE EXPECTED: NORMAL
DME PARACHUTE DELIVERY DATE REQUESTED: NORMAL
DME PARACHUTE DELIVERY NOTE: NORMAL
DME PARACHUTE ITEM DESCRIPTION: NORMAL
DME PARACHUTE ORDER STATUS: NORMAL
DME PARACHUTE SUPPLIER NAME: NORMAL
DME PARACHUTE SUPPLIER PHONE: NORMAL
EOSINOPHIL # BLD AUTO: 0.06 THOUSAND/ÂΜL (ref 0–0.61)
EOSINOPHIL NFR BLD AUTO: 1 % (ref 0–6)
ERYTHROCYTE [DISTWIDTH] IN BLOOD BY AUTOMATED COUNT: 15.6 % (ref 11.6–15.1)
GFR SERPL CREATININE-BSD FRML MDRD: 20 ML/MIN/1.73SQ M
GLUCOSE SERPL-MCNC: 183 MG/DL (ref 65–140)
GLUCOSE SERPL-MCNC: 196 MG/DL (ref 65–140)
GLUCOSE SERPL-MCNC: 209 MG/DL (ref 65–140)
GLUCOSE SERPL-MCNC: 246 MG/DL (ref 65–140)
GLUCOSE SERPL-MCNC: 267 MG/DL (ref 65–140)
GRAM STN SPEC: NORMAL
GRAM STN SPEC: NORMAL
HCT VFR BLD AUTO: 32.9 % (ref 36.5–49.3)
HGB BLD-MCNC: 11 G/DL (ref 12–17)
IMM GRANULOCYTES # BLD AUTO: 0.09 THOUSAND/UL (ref 0–0.2)
IMM GRANULOCYTES NFR BLD AUTO: 1 % (ref 0–2)
LYMPHOCYTES # BLD AUTO: 0.84 THOUSANDS/ÂΜL (ref 0.6–4.47)
LYMPHOCYTES NFR BLD AUTO: 13 % (ref 14–44)
MCH RBC QN AUTO: 34.9 PG (ref 26.8–34.3)
MCHC RBC AUTO-ENTMCNC: 33.4 G/DL (ref 31.4–37.4)
MCV RBC AUTO: 104 FL (ref 82–98)
MONOCYTES # BLD AUTO: 0.8 THOUSAND/ÂΜL (ref 0.17–1.22)
MONOCYTES NFR BLD AUTO: 12 % (ref 4–12)
NEUTROPHILS # BLD AUTO: 4.7 THOUSANDS/ÂΜL (ref 1.85–7.62)
NEUTS SEG NFR BLD AUTO: 73 % (ref 43–75)
NRBC BLD AUTO-RTO: 0 /100 WBCS
PLATELET # BLD AUTO: 49 THOUSANDS/UL (ref 149–390)
PMV BLD AUTO: 10.7 FL (ref 8.9–12.7)
POTASSIUM SERPL-SCNC: 5.2 MMOL/L (ref 3.5–5.3)
PROT SERPL-MCNC: 5.3 G/DL (ref 6.4–8.4)
RBC # BLD AUTO: 3.15 MILLION/UL (ref 3.88–5.62)
SODIUM SERPL-SCNC: 132 MMOL/L (ref 135–147)
WBC # BLD AUTO: 6.5 THOUSAND/UL (ref 4.31–10.16)

## 2023-06-28 PROCEDURE — 82948 REAGENT STRIP/BLOOD GLUCOSE: CPT

## 2023-06-28 PROCEDURE — 99232 SBSQ HOSP IP/OBS MODERATE 35: CPT | Performed by: INTERNAL MEDICINE

## 2023-06-28 PROCEDURE — 94760 N-INVAS EAR/PLS OXIMETRY 1: CPT

## 2023-06-28 PROCEDURE — 94640 AIRWAY INHALATION TREATMENT: CPT

## 2023-06-28 PROCEDURE — 99223 1ST HOSP IP/OBS HIGH 75: CPT | Performed by: STUDENT IN AN ORGANIZED HEALTH CARE EDUCATION/TRAINING PROGRAM

## 2023-06-28 PROCEDURE — 85025 COMPLETE CBC W/AUTO DIFF WBC: CPT | Performed by: INTERNAL MEDICINE

## 2023-06-28 PROCEDURE — 80048 BASIC METABOLIC PNL TOTAL CA: CPT | Performed by: INTERNAL MEDICINE

## 2023-06-28 PROCEDURE — 80076 HEPATIC FUNCTION PANEL: CPT | Performed by: INTERNAL MEDICINE

## 2023-06-28 PROCEDURE — 99239 HOSP IP/OBS DSCHRG MGMT >30: CPT | Performed by: INTERNAL MEDICINE

## 2023-06-28 PROCEDURE — 99233 SBSQ HOSP IP/OBS HIGH 50: CPT | Performed by: INTERNAL MEDICINE

## 2023-06-28 RX ORDER — DOCUSATE SODIUM 100 MG/1
100 CAPSULE, LIQUID FILLED ORAL 2 TIMES DAILY
Status: DISCONTINUED | OUTPATIENT
Start: 2023-06-29 | End: 2023-07-11 | Stop reason: HOSPADM

## 2023-06-28 RX ORDER — TIMOLOL MALEATE 5 MG/ML
1 SOLUTION/ DROPS OPHTHALMIC DAILY
Status: CANCELLED | OUTPATIENT
Start: 2023-06-29

## 2023-06-28 RX ORDER — INSULIN LISPRO 100 [IU]/ML
1-5 INJECTION, SOLUTION INTRAVENOUS; SUBCUTANEOUS
Status: DISCONTINUED | OUTPATIENT
Start: 2023-06-28 | End: 2023-06-28 | Stop reason: HOSPADM

## 2023-06-28 RX ORDER — HEPARIN SODIUM 5000 [USP'U]/ML
5000 INJECTION, SOLUTION INTRAVENOUS; SUBCUTANEOUS EVERY 8 HOURS SCHEDULED
Status: DISCONTINUED | OUTPATIENT
Start: 2023-06-28 | End: 2023-07-01

## 2023-06-28 RX ORDER — INSULIN LISPRO 100 [IU]/ML
1-5 INJECTION, SOLUTION INTRAVENOUS; SUBCUTANEOUS
Status: CANCELLED | OUTPATIENT
Start: 2023-06-29

## 2023-06-28 RX ORDER — LATANOPROST 50 UG/ML
1 SOLUTION/ DROPS OPHTHALMIC
Status: CANCELLED | OUTPATIENT
Start: 2023-06-28

## 2023-06-28 RX ORDER — HEPARIN SODIUM 5000 [USP'U]/ML
5000 INJECTION, SOLUTION INTRAVENOUS; SUBCUTANEOUS EVERY 8 HOURS SCHEDULED
Status: DISCONTINUED | OUTPATIENT
Start: 2023-06-28 | End: 2023-06-28 | Stop reason: HOSPADM

## 2023-06-28 RX ORDER — INSULIN LISPRO 100 [IU]/ML
1-5 INJECTION, SOLUTION INTRAVENOUS; SUBCUTANEOUS
Status: DISCONTINUED | OUTPATIENT
Start: 2023-06-28 | End: 2023-07-03

## 2023-06-28 RX ORDER — IPRATROPIUM BROMIDE AND ALBUTEROL SULFATE 2.5; .5 MG/3ML; MG/3ML
3 SOLUTION RESPIRATORY (INHALATION)
Status: DISCONTINUED | OUTPATIENT
Start: 2023-06-29 | End: 2023-06-28

## 2023-06-28 RX ORDER — SENNOSIDES 8.6 MG
2 TABLET ORAL
Status: CANCELLED | OUTPATIENT
Start: 2023-06-28

## 2023-06-28 RX ORDER — TORSEMIDE 20 MG/1
40 TABLET ORAL DAILY
Status: DISCONTINUED | OUTPATIENT
Start: 2023-06-28 | End: 2023-06-28 | Stop reason: HOSPADM

## 2023-06-28 RX ORDER — TAMSULOSIN HYDROCHLORIDE 0.4 MG/1
0.4 CAPSULE ORAL
Status: DISCONTINUED | OUTPATIENT
Start: 2023-06-29 | End: 2023-07-11 | Stop reason: HOSPADM

## 2023-06-28 RX ORDER — INSULIN GLARGINE 100 [IU]/ML
10 INJECTION, SOLUTION SUBCUTANEOUS EVERY MORNING
Status: CANCELLED | OUTPATIENT
Start: 2023-06-29

## 2023-06-28 RX ORDER — POLYETHYLENE GLYCOL 3350 17 G/17G
17 POWDER, FOR SOLUTION ORAL DAILY
Status: CANCELLED | OUTPATIENT
Start: 2023-06-29

## 2023-06-28 RX ORDER — TORSEMIDE 20 MG/1
40 TABLET ORAL DAILY
Status: DISCONTINUED | OUTPATIENT
Start: 2023-06-29 | End: 2023-06-29

## 2023-06-28 RX ORDER — INSULIN LISPRO 100 [IU]/ML
1-5 INJECTION, SOLUTION INTRAVENOUS; SUBCUTANEOUS
Status: DISCONTINUED | OUTPATIENT
Start: 2023-06-29 | End: 2023-07-03

## 2023-06-28 RX ORDER — ATORVASTATIN CALCIUM 40 MG/1
40 TABLET, FILM COATED ORAL
Status: DISCONTINUED | OUTPATIENT
Start: 2023-06-29 | End: 2023-07-11 | Stop reason: HOSPADM

## 2023-06-28 RX ORDER — ASCORBIC ACID 500 MG
500 TABLET ORAL DAILY
Status: CANCELLED | OUTPATIENT
Start: 2023-06-29

## 2023-06-28 RX ORDER — TORSEMIDE 20 MG/1
40 TABLET ORAL DAILY
Status: CANCELLED | OUTPATIENT
Start: 2023-06-29

## 2023-06-28 RX ORDER — ASCORBIC ACID 500 MG
500 TABLET ORAL DAILY
Status: DISCONTINUED | OUTPATIENT
Start: 2023-06-29 | End: 2023-07-11 | Stop reason: HOSPADM

## 2023-06-28 RX ORDER — POLYETHYLENE GLYCOL 3350 17 G/17G
17 POWDER, FOR SOLUTION ORAL DAILY
Status: DISCONTINUED | OUTPATIENT
Start: 2023-06-29 | End: 2023-07-11 | Stop reason: HOSPADM

## 2023-06-28 RX ORDER — MELATONIN
2000 DAILY
Status: DISCONTINUED | OUTPATIENT
Start: 2023-06-29 | End: 2023-07-11 | Stop reason: HOSPADM

## 2023-06-28 RX ORDER — DOCUSATE SODIUM 100 MG/1
100 CAPSULE, LIQUID FILLED ORAL 2 TIMES DAILY
Status: CANCELLED | OUTPATIENT
Start: 2023-06-28

## 2023-06-28 RX ORDER — ATORVASTATIN CALCIUM 40 MG/1
40 TABLET, FILM COATED ORAL
Status: CANCELLED | OUTPATIENT
Start: 2023-06-29

## 2023-06-28 RX ORDER — SENNOSIDES 8.6 MG
2 TABLET ORAL
Status: DISCONTINUED | OUTPATIENT
Start: 2023-06-28 | End: 2023-07-11 | Stop reason: HOSPADM

## 2023-06-28 RX ORDER — INSULIN GLARGINE 100 [IU]/ML
10 INJECTION, SOLUTION SUBCUTANEOUS EVERY MORNING
Status: DISCONTINUED | OUTPATIENT
Start: 2023-06-29 | End: 2023-06-28

## 2023-06-28 RX ORDER — HEPARIN SODIUM 5000 [USP'U]/ML
5000 INJECTION, SOLUTION INTRAVENOUS; SUBCUTANEOUS EVERY 8 HOURS SCHEDULED
Status: CANCELLED | OUTPATIENT
Start: 2023-06-28

## 2023-06-28 RX ORDER — CEFEPIME HYDROCHLORIDE 1 G/50ML
1000 INJECTION, SOLUTION INTRAVENOUS EVERY 12 HOURS
Status: CANCELLED | OUTPATIENT
Start: 2023-06-28 | End: 2023-06-29

## 2023-06-28 RX ORDER — LATANOPROST 50 UG/ML
1 SOLUTION/ DROPS OPHTHALMIC
Status: DISCONTINUED | OUTPATIENT
Start: 2023-06-28 | End: 2023-07-11 | Stop reason: HOSPADM

## 2023-06-28 RX ORDER — INSULIN LISPRO 100 [IU]/ML
1-5 INJECTION, SOLUTION INTRAVENOUS; SUBCUTANEOUS
Status: CANCELLED | OUTPATIENT
Start: 2023-06-28

## 2023-06-28 RX ORDER — MELATONIN
2000 DAILY
Status: CANCELLED | OUTPATIENT
Start: 2023-06-29

## 2023-06-28 RX ORDER — TAMSULOSIN HYDROCHLORIDE 0.4 MG/1
0.4 CAPSULE ORAL
Status: CANCELLED | OUTPATIENT
Start: 2023-06-29

## 2023-06-28 RX ORDER — TIMOLOL MALEATE 5 MG/ML
1 SOLUTION/ DROPS OPHTHALMIC DAILY
Status: DISCONTINUED | OUTPATIENT
Start: 2023-06-29 | End: 2023-07-11 | Stop reason: HOSPADM

## 2023-06-28 RX ORDER — IPRATROPIUM BROMIDE AND ALBUTEROL SULFATE 2.5; .5 MG/3ML; MG/3ML
3 SOLUTION RESPIRATORY (INHALATION)
Status: CANCELLED | OUTPATIENT
Start: 2023-06-28

## 2023-06-28 RX ADMIN — INSULIN LISPRO 1 UNITS: 100 INJECTION, SOLUTION INTRAVENOUS; SUBCUTANEOUS at 17:42

## 2023-06-28 RX ADMIN — HEPARIN SODIUM 5000 UNITS: 5000 INJECTION INTRAVENOUS; SUBCUTANEOUS at 22:22

## 2023-06-28 RX ADMIN — DOCUSATE SODIUM 100 MG: 100 CAPSULE, LIQUID FILLED ORAL at 08:43

## 2023-06-28 RX ADMIN — TORSEMIDE 40 MG: 20 TABLET ORAL at 15:16

## 2023-06-28 RX ADMIN — CARBAMIDE PEROXIDE 6.5% 5 DROP: 6.5 LIQUID AURICULAR (OTIC) at 08:31

## 2023-06-28 RX ADMIN — SENNOSIDES 17.2 MG: 8.6 TABLET, FILM COATED ORAL at 22:19

## 2023-06-28 RX ADMIN — IPRATROPIUM BROMIDE AND ALBUTEROL SULFATE 3 ML: 2.5; .5 SOLUTION RESPIRATORY (INHALATION) at 13:25

## 2023-06-28 RX ADMIN — IPRATROPIUM BROMIDE AND ALBUTEROL SULFATE 3 ML: 2.5; .5 SOLUTION RESPIRATORY (INHALATION) at 02:44

## 2023-06-28 RX ADMIN — CEFEPIME HYDROCHLORIDE 1000 MG: 1 INJECTION, SOLUTION INTRAVENOUS at 08:43

## 2023-06-28 RX ADMIN — INSULIN GLARGINE 10 UNITS: 100 INJECTION, SOLUTION SUBCUTANEOUS at 08:44

## 2023-06-28 RX ADMIN — INSULIN LISPRO 2 UNITS: 100 INJECTION, SOLUTION INTRAVENOUS; SUBCUTANEOUS at 08:31

## 2023-06-28 RX ADMIN — Medication 2000 UNITS: at 08:43

## 2023-06-28 RX ADMIN — ATORVASTATIN CALCIUM 40 MG: 40 TABLET, FILM COATED ORAL at 16:13

## 2023-06-28 RX ADMIN — POLYETHYLENE GLYCOL 3350 17 G: 17 POWDER, FOR SOLUTION ORAL at 08:43

## 2023-06-28 RX ADMIN — CEFEPIME 1000 MG: 1 INJECTION, POWDER, FOR SOLUTION INTRAMUSCULAR; INTRAVENOUS at 22:27

## 2023-06-28 RX ADMIN — INSULIN LISPRO 2 UNITS: 100 INJECTION, SOLUTION INTRAVENOUS; SUBCUTANEOUS at 12:23

## 2023-06-28 RX ADMIN — CHLORHEXIDINE GLUCONATE 0.12% ORAL RINSE 15 ML: 1.2 LIQUID ORAL at 08:44

## 2023-06-28 RX ADMIN — HEPARIN SODIUM 5000 UNITS: 5000 INJECTION INTRAVENOUS; SUBCUTANEOUS at 15:16

## 2023-06-28 RX ADMIN — INSULIN LISPRO 1 UNITS: 100 INJECTION, SOLUTION INTRAVENOUS; SUBCUTANEOUS at 22:24

## 2023-06-28 RX ADMIN — OXYCODONE HYDROCHLORIDE AND ACETAMINOPHEN 500 MG: 500 TABLET ORAL at 08:43

## 2023-06-28 RX ADMIN — TIMOLOL MALEATE 1 DROP: 5 SOLUTION/ DROPS OPHTHALMIC at 08:31

## 2023-06-28 RX ADMIN — TAMSULOSIN HYDROCHLORIDE 0.4 MG: 0.4 CAPSULE ORAL at 16:13

## 2023-06-28 RX ADMIN — IPRATROPIUM BROMIDE AND ALBUTEROL SULFATE 3 ML: 2.5; .5 SOLUTION RESPIRATORY (INHALATION) at 07:48

## 2023-06-28 RX ADMIN — DOCUSATE SODIUM 100 MG: 100 CAPSULE, LIQUID FILLED ORAL at 17:44

## 2023-06-28 NOTE — PROGRESS NOTES
Progress Note - Cardiology   [facility]     Leonor Dugan 78 y o  male MRN: [id number]  : 1940  Unit/Bed#: [address] [place] [address] Encounter: [id number]    Assessment and Plan:   1  Pericardial effusion      - Patient with known history of pericardial effusion  Has been documented since 2019 on TTE   - Currently hemodynamically stable  No evidence of tamponade on most recent HUMA from 2023   - 23 TTE: Pericardium: There is a large pericardial effusion circumferential to the heart  The fluid exhibits no internal echoes   In some views circumferential diameter is around 3 cm   As compared to previous study of  - effusion has increased   No dense of diastolic collapse of RV and right atrial collapse noted consistent with tamponade  - 21 TTE: Pericardium: A moderate to large, free-flowing pericardial effusion was identified circumferential to the heart  The fluid had no internal echoes  There was no evidence of hemodynamic compromise  - 19 TTE: Pericardium: A moderate, free-flowing pericardial effusion was identified  There was no evidence of hemodynamic compromise  - Continue to closely monitor  Please contact Cardiology if patient becomes hemodynamically unstable  - Patient would likely benefit from pericardiocentesis  Will discuss with attending   - Home medication of Eliquis 2 5 mg twice daily on hold pending further evaluation for pericardial effusion      2  Septic shock      - Presented on 2023 for evaluation for diarrhea x 4 days  - Was admitted to ICU for sepsis, on presentation was hypothermic, tachypneic, hypotensive, and noted leukopenia  Transferred to floor on 2023   - 23 blood culture #1: No growth to date  - 23 blood culture #2: Micrococcus luteus  - 23 CT chest/abdomen/pelvis without contrast: Small right pleural effusion with thickened enhancing periphery which may represent empyema   Dense opacity within the right lower lobe may be due to atelectasis versus pneumonia  Opacity at the right lung base likely due to atelectasis  Large pericardial effusion  Anasarca  - Underwent IR placement of right pigtail chest tube on 6/24/23 due to concern for right-sided empyema  - Currently on cefepime   - Per primary team      3  Chronic combined systolic and diastolic heart failure      - Does not appear in acute exacerbation  Patient euvolemic on examination  - 6/23/23 BNP: 141    - 6/23/23 CXR: Enlargement of the cardiac silhouette due to cardiomegaly and pericardial effusion per CT  Right base opacity due to loculated right effusion and right lower lobe rounded atelectasis per CT   - 6/26/23 CXR: Right-sided pleural drainage catheter is seen with a decreasing right effusion  No worsening  residual right effusion/atelectasis seen  No pneumothorax seen  - 6/24/23 TTE: LVEF 45%  Systolic function is mildly reduced   There is mild global hypokinesis  Unable to assess diastolic function due to atrial fibrillation  Mild aortic valve regurgitation  Mild mitral valve regurgitation  Mild tricuspid valve regurgitation  PA pressure around 40 to 45 mmHg  - 4/01/23 TTE: LVEF 40-45%  There was mild diffuse hypokinesis  There was moderate concentric hypertrophy  Features were consistent with a pseudonormal left ventricular filling pattern, with concomitant abnormal relaxation and increased filling pressure (grade 2 diastolic dysfunction)  Mild mitral valve regurgitation  Moderate tricuspid valve regurgitation  Pulmonary artery systolic pressure moderately increased  - Review of home medication notes patient is on Coreg 6 25 mg twice daily, Imdur 30 mg daily, and torsemide 40 mg daily  Home medications held in setting of hypotension  Will consider restarting home medications today, 6/28/23, will discuss with attending    - Monitor weight  - Monitor I/Os     - Continue low-sodium diet with 1800 mL fluid restriction    - CHF "education      4  Chronic atrial fibrillation      - Patient has history of chronic atrial fibrillation  - 6/23/23 EKG: Atrial fibrillation with slow ventricular response, ventricular rate 50 bpm  PVCs  - [id number] stroke risk score: 5 points, moderate-high risk  - Home medication of Eliquis 2 5 mg twice daily on hold pending further evaluation for pericardial effusion      5  Hypertension      - BP acceptable  - Review of home medication notes patient is on Coreg 6 25 mg twice daily, Imdur 30 mg daily, and torsemide 40 mg daily  Home medications held in setting of hypotension   - Will consider restarting home medications today, 6/28/23, will discuss with attending       6  Thrombocytopenia      - 6/27/23 platelets: 48    - Since admission platelet count between 48-66   - Continue to trend platelets       7  CKD, stage IV      - Baseline creatinine 2 5-3   - Care per primary team      8  DMII       - 2/23/23 HgbA1c: 8 3    - Care per primary team   Subjective / Objective:         No acute events overnight  Patient reports feeling ok this morning, eating breakfast when arrived to bedside  He offers no complaints  He denies chest pain, shortness of breath, palpitations, lightheadedness, dizziness, headache, nausea or vomiting  Vitals: Blood pressure 126/60, pulse 56, temperature (!) 97 °F (36 1 °C), resp  rate 18, height 5' 9\" (1 753 m), weight 74 5 kg (164 lb 3 9 oz), SpO2 98 %  Vitals:    06/27/23 0542 06/28/23 0600   Weight: 70 9 kg (156 lb 4 9 oz) 74 5 kg (164 lb 3 9 oz)     Body mass index is 24 25 kg/m²  BP Readings from Last 3 Encounters:   06/28/23 126/60   05/11/23 134/72   03/23/23 110/50     Orthostatic Blood Pressures    Flowsheet Row Most Recent Value   Blood Pressure 126/60 filed at 06/28/2023 0741   Patient Position - Orthostatic VS Lying filed at 06/27/2023 2223        I/O       06/26 0701 06/27 0700 06/27 0701 06/28 0700 06/28 0701 06/29 0700    P  O  1040 240     I V  (mL/kg) 30 " (0  4) 10 (0 1)     IV Piggyback 50      Total Intake(mL/kg) 1120 (15 8) 250 (3 4)     Urine (mL/kg/hr) 2150 (1 3) 1500 (0 8)     Chest Tube 540 240     Total Output 2690 1740     Net -1570 -1490            Unmeasured Urine Occurrence 1 x          Invasive Devices     Peripheral Intravenous Line  Duration           Peripheral IV 06/23/23 Left Forearm 4 days    Peripheral IV 06/23/23 Right Forearm 4 days    Long-Dwell Peripheral IV (Midline) 11/54/90 Right Basilic 3 days          Drain  Duration           Chest Tube Right 10 2 Fr  3 days    Urethral Catheter 16 Fr  <1 day                  Intake/Output Summary (Last 24 hours) at 6/28/2023 0743  Last data filed at 6/28/2023 0415  Gross per 24 hour   Intake 250 ml   Output 1740 ml   Net -1490 ml       Physical Exam:   Physical Exam  Vitals reviewed  Constitutional:       General: He is not in acute distress  Appearance: He is ill-appearing  Cardiovascular:      Rate and Rhythm: Normal rate  Rhythm irregular  Pulses: Normal pulses  Heart sounds: Murmur heard  Pulmonary:      Effort: Pulmonary effort is normal  No respiratory distress  Breath sounds: Decreased breath sounds present  Abdominal:      General: Abdomen is flat  There is no distension  Palpations: Abdomen is soft  Tenderness: There is no abdominal tenderness  Musculoskeletal:      Right lower leg: Edema present  Left lower leg: Edema present  Skin:     General: Skin is warm and dry  Findings: Bruising present  Comments: Bilateral lower extremities with bruising  Neurological:      Mental Status: He is alert         Medications/ Allergies:     Current Facility-Administered Medications   Medication Dose Route Frequency Provider Last Rate   • ascorbic acid  500 mg Oral Daily Clarence Reynoso MD     • atorvastatin  40 mg Oral Daily With Nikhil Diamond MD     • carbamide peroxide  5 drop Both Ears BID Clarence Reynoso MD     • cefepime 1,000 mg Intravenous Q12H Paige Glass MD 1,000 mg (06/27/23 2104)   • chlorhexidine  15 mL Mouth/Throat Q12H [address] Paige Glass MD     • cholecalciferol  2,000 Units Oral Daily Paige Glass MD     • docusate sodium  100 mg Oral BID Paige Glass MD     • insulin glargine  10 Units Subcutaneous QAM Paige Glass MD     • insulin lispro  1-6 Units Subcutaneous 4 times day Paige Glass MD     • ipratropium-albuterol  3 mL Nebulization Q6H Paige Glass MD     • latanoprost  1 drop Both Eyes HS Paige Glass MD     • pneumococcal 20-guillermina conj vacc  0 5 mL Intramuscular Prior to discharge Paige Glass MD     • polyethylene glycol  17 g Oral Daily Paige Glass MD     • senna  2 tablet Oral HS Paige Glass MD     • tamsulosin  0 4 mg Oral Daily With Sixto Ko MD     • timolol  1 drop Both Eyes Daily Paige Glass MD       pneumococcal 20-guillermina conj vacc, 0 5 mL, Prior to discharge      Allergies   Allergen Reactions   • Elemental Sulfur    • Sulfa Antibiotics        VTE Pharmacologic Prophylaxis: none       Labs:   Troponins:  Results from last 7 days   Lab Units 06/23/23  2357   HSTNI D2 ng/L 0     CBC with diff:  Results from last 7 days   Lab Units 06/27/23  0539 06/26/23  0600 06/25/23  0559 06/24/23  0615 06/23/23  2153   WBC Thousand/uL 5 08 6 37 4 24* 3 01* 3 64*   HEMOGLOBIN g/dL 9 7* 10 6* 10 1* 10 0* 10 7*   HEMATOCRIT % 29 4* 31 7* 30 3* 30 4* 32 5*   MCV fL 104* 105* 105* 105* 105*   PLATELETS Thousands/uL 48* 52* 58* 58* 66*   RBC Million/uL 2 82* 3 03* 2 88* 2 90* 3 09*   MCH pg 34 4* 35 0* 35 1* 34 5* 34 6*   MCHC g/dL 33 0 33 4 33 3 32 9 32 9   RDW % 15 5* 15 8* 15 6* 15 0 15 3*   MPV fL 10 8 10 8 11 3 10 8 11 0   NRBC AUTO /100 WBCs  --  0 0 1  --        CMP:  Results from last 7 days   Lab Units 06/27/23  0539 06/26/23  0600 06/25/23  0559 06/24/23  2200 06/24/23  0615 06/23/23  2153   SODIUM mmol/L 135 134* 134* 127* 129* 127*   POTASSIUM mmol/L 4 5 4 4 3 8 3 7 4 2 4 5   CHLORIDE mmol/L 105 103 102 100 99 93*   CO2 mmol/L 23 21 25 23 23 23   ANION GAP mmol/L 7 10 7 4 7 11   BUN mg/dL 76* 83* 85* 85* 89* 100*   CREATININE mg/dL 3 05* 3 38* 3 62* 3 54* 3 57* 3 72*   CALCIUM mg/dL 7 3* 7 6* 7 5* 6 8* 7 1* 7 4*   AST U/L  --   --  20  --  18 21   ALT U/L  --   --  23  --  22 25   ALK PHOS U/L  --   --  116*  --  113* 122*   TOTAL PROTEIN g/dL  --   --  5 2*  --  4 8* 5 8*   ALBUMIN g/dL  --   --  2 8*  --  2 8* 3 2*   TOTAL BILIRUBIN mg/dL  --   --  0 59  --  0 54 0 62   EGFR ml/min/1 73sq m 17 15 14 14 14 14       Magnesium:  Results from last 7 days   Lab Units 06/27/23  0539 06/26/23  0600 06/25/23  0559 06/24/23  0615 06/23/23  2153   MAGNESIUM mg/dL 2 1 2 2 2 2 2 2 2 5     Coags:  Results from last 7 days   Lab Units 06/26/23  0600 06/24/23  0615   INR  1 48* 2 15*     TSH:  Results from last 7 days   Lab Units 06/25/23  0559   TSH 3RD GENERATON uIU/mL 4 048     Imaging & Testing   I have personally reviewed pertinent reports  XR chest portable ICU     Result Date: 6/26/2023     Impression: Right-sided pleural drainage catheter is seen with a decreasing right effusion No worsening Residual right effusion/atelectasis seen No pneumothorax seen      IR chest tube placement     Result Date: 6/25/2023     Impression: Right pigtail chest tube placement into minimally complex pleural effusion  Yellow fluid obtained and sent for laboratory analysis  _______________________________________________________________ COMPARISON: CT chest 6/24/2023 PROCEDURE DETAILS: Operators: Dr Willem Aldridge Anesthesia: Local Medications: 1% lidocaine Contrast: 0 mL of Omnipaque 300 Fluoroscopy time: 0 minutes COMMENTS: The patient was placed in an upright position  A preprocedure timeout was performed per [facility] protocol  Ultrasound evaluation demonstrated a minimally complex right pleural effusion   The right back was prepped and draped in the usual sterile fashion  All elements of maximal sterile barrier technique were followed (cap, mask, sterile gown, sterile gloves, large sterile sheet, hand hygiene, and 2% chlorhexidine for cutaneous antisepsis)  Lidocaine was administered to the skin, and a small skin incision  was made  Under direct ultrasound guidance, an 18 gauge needle was advanced into the pleural fluid  Guidewire was advanced through the needle, needle was removed and a 10 Armenian pigtail chest tube was advanced over the wire with pigtail formed in the pleural collection  Tube was then sutured the skin with 2-0 Prolene and connected to Pleur-evac  Sterile dressings were applied       XR chest 1 view portable     Result Date: 6/24/2023     Impression: Enlargement of the cardiac silhouette due to cardiomegaly and pericardial effusion per CT Right base opacity due to loculated right effusion and right lower lobe rounded atelectasis per CT       CT chest abdomen pelvis wo contrast     Result Date: 6/24/2023     Impression: 1  Small right pleural effusion with thickened enhancing periphery which may represent empyema  Dense opacity within the right lower lobe may be due to atelectasis versus pneumonia  Opacity at the right lung base likely due to atelectasis  2   Large pericardial effusion  Anasarca  3   Cholelithiasis  Gallbladder wall thickening/fluid may be due to volume overload  Further evaluation with HIDA scan may be obtained if there is clinical concern for cholecystitis  4   Left inguinal hernia contains a loop of nonobstructed proximal sigmoid colon  5   Other findings as above        EKG / Monitor: Personally reviewed  Telemetry reviewed: currently atrial fibrillation, ventricular rate 73 bpm      Cardiac testing:     Echo complete with contrast if indicated - 6/24/23     Left Ventricle Left ventricular cavity size is normal  Wall thickness is mildly increased  There is mild concentric hypertrophy   The left ventricular ejection fraction is 45% by visual estimation  Systolic function is mildly reduced   There is mild global hypokinesis  Unable to assess diastolic function due to atrial fibrillation  Interventricular Septum There is excessive respiratory change in interventricular septal motion  Consider cardiac tamponade, mechanical ventilation or other causes  Right Ventricle Right ventricular cavity size is normal  Systolic function is normal  Wall thickness is increased  Left Atrium The atrium is severely dilated  Right Atrium The atrium is mildly dilated  Aortic Valve The aortic valve is trileaflet  The leaflets are mildly thickened  The leaflets are not calcified  The leaflets exhibit normal mobility  There is mild regurgitation  The aortic valve has no significant stenosis  Mitral Valve There is mild regurgitation  There is no evidence of stenosis  Tricuspid Valve There is mild regurgitation   PA pressure around 40 to 45 mmHg  There is no evidence of stenosis  Pulmonic Valve Unable to assess pulmonic valve regurgitation due to poor Doppler exam  There is no evidence of stenosis  IVC/SVC The inferior vena cava is normal in size  Respirophasic changes were blunted (less than 50% variation)  Pericardium There is a large pericardial effusion circumferential to the heart   The fluid exhibits no internal echoes   In some views circumferential diameter is around 3 cm   As compared to previous study of 2019 -2021 effusion has increased   No dense of diastolic collapse of RV and right atrial collapse noted consistent with tamponade           Results for orders placed during the hospital encounter of 04/01/21    Echo complete with contrast if indicated    Narrative  [address]  [address] [address]  [phone]    Transthoracic Echocardiogram  2D, M-mode, Doppler, and Color Doppler    Study date:  01-Apr-2021    Patient: Ronel Garcia [facility]  MR number: [id number]  Account number: [de-identified]  : 1940  Age: 78 years  Gender: Male  Status: Outpatient  Location: Echo lab  Height: 69 in  Weight: 156 6 lb  BP: 152/ 80 mmHg    Indications: Chronic Systolic CHF/Leg Edema    Diagnoses: 428 0 - CONGESTIVE HEART FAILURE    Sonographer:  PELON Carrington  Primary Physician:  Chapincito De La Fuente DO  Referring Physician:  Adriana Palmer DO  Group:  [facility]  Interpreting Physician:  Adriana Palmer DO    SUMMARY    LEFT VENTRICLE:  Systolic function was mildly to moderately reduced by visual assessment  Ejection fraction was estimated in the range of 40 % to 45 %  There was mild diffuse hypokinesis  There was moderate concentric hypertrophy  Features were consistent with a pseudonormal left ventricular filling pattern, with concomitant abnormal relaxation and increased filling pressure (grade 2 diastolic dysfunction)  LEFT ATRIUM:  The atrium was mildly dilated  MITRAL VALVE:  There was mild regurgitation  AORTIC VALVE:  There was no evidence for stenosis  There was no regurgitation  TRICUSPID VALVE:  There was moderate regurgitation  Pulmonary artery systolic pressure was moderately increased  PERICARDIUM:  A moderate to large, free-flowing pericardial effusion was identified circumferential to the heart  The fluid had no internal echoes  There was no evidence of hemodynamic compromise  COMPARISONS:  There has been no significant interval change  Pericardial effusion is overall unchanged  There is no evidence of tamponade present  Comparison was made with the previous study of 2019  HISTORY: PRIOR HISTORY: AFIB,CKD,CAD,Diabetes,HLD,HTN,PVC  Pericardial effusion    PROCEDURE: The procedure was performed in the echo lab  This was a routine study  The transthoracic approach was used  The study included complete 2D imaging, M-mode, complete spectral Doppler, and color Doppler   The heart rate was 68 bpm,  at the start of the study  Images were obtained from the parasternal, apical, subcostal, and suprasternal notch acoustic windows  Image quality was adequate  LEFT VENTRICLE: Size was normal  Systolic function was mildly to moderately reduced by visual assessment  Ejection fraction was estimated in the range of 40 % to 45 %  There was mild diffuse hypokinesis  There was moderate concentric  hypertrophy  DOPPLER: Features were consistent with a pseudonormal left ventricular filling pattern, with concomitant abnormal relaxation and increased filling pressure (grade 2 diastolic dysfunction)  RIGHT VENTRICLE: The size was normal  Systolic function was normal  DOPPLER: Systolic pressure was within the normal range  LEFT ATRIUM: The atrium was mildly dilated  RIGHT ATRIUM: Size was normal     MITRAL VALVE: Valve structure was normal  There was normal leaflet separation  No echocardiographic evidence for prolapse  DOPPLER: The transmitral velocity was within the normal range  There was no evidence for stenosis  There was mild  regurgitation  AORTIC VALVE: The valve was trileaflet  Leaflets exhibited normal thickness and normal cuspal separation  DOPPLER: Transaortic velocity was within the normal range  There was no evidence for stenosis  There was no regurgitation  TRICUSPID VALVE: The valve structure was normal  There was normal leaflet separation  DOPPLER: The transtricuspid velocity was within the normal range  There was moderate regurgitation  Pulmonary artery systolic pressure was moderately  increased  Estimated peak PA pressure was 55 mmHg  PULMONIC VALVE: Leaflets exhibited normal thickness, no calcification, and normal cuspal separation  DOPPLER: The transpulmonic velocity was within the normal range  There was mild regurgitation  PERICARDIUM: There was no thickening  A moderate to large, free-flowing pericardial effusion was identified circumferential to the heart   The fluid had no internal echoes  There was no evidence of hemodynamic compromise  AORTA: The root exhibited normal size  PULMONARY ARTERY: The size was normal  The morphology appeared normal     SYSTEM MEASUREMENT TABLES    2D  EF (Teich): 41 74 %  %FS: 20 2 %  Ao Diam: 3 38 cm  EDV(Teich): 75 16 ml  ESV(Teich): 43 78 ml  HR_2Ch_Q: 53 32 bpm  IVSd: 0 99 cm  LA Area: 24 26 cm2  LA Diam: 4 22 cm  LVCO_2Ch_Q: 1 71 L/min  LVEF_2Ch_Q: 44 12 %  LVIDd: 4 12 cm  LVIDs: 3 29 cm  LVLd_2Ch_Q: 6 35 cm  LVLs_2Ch_Q: 5 57 cm  LVOT Diam: 1 87 cm  LVPWd: 1 15 cm  LVSV_2Ch_Q: 32 03 ml  LVVED_2Ch_Q: 72 58 ml  LVVES_2Ch_Q: 40 56 ml  RA Area: 16 3 cm2  RVIDd: 2 53 cm  SV (Teich): 31 37 ml    CW  TR Vmax: 3 42 m/s  TR maxP 89 mmHg    MM  TAPSE: 1 19 cm    PW  E' Lat: 0 11 m/s  E' Sept: 0 08 m/s    [facility]    Prepared and electronically signed by    Luis Alfredo Brown DO  Signed 2021 16:42:17    Results for orders placed during the hospital encounter of 01/10/19    NM Myocardial Perfusion Spect (Exercise Induced Stress and/or Rest)    Narrative [facility] [address] [place], [address]  [phone]    Rest/Stress Gated SPECT Myocardial Perfusion Imaging After Regadenoson    Patient: Raisa Nova  MR number: [id number]  Account number: [de-identified]  : 1940  Age: [de-identified] years  Gender: Male  Status: Inpatient  Location: Stress lab  Height: 69 in  Weight: 166 lb  BP: 187/ 80 mmHg    Allergies: SULFA ANTIBIOTICS, SULFUR    Diagnosis: R06 02 - Shortness of breath    Primary Physician:  Mariah Parish DO  Technician:  Radha Johnson  RN:  VLADIMIR Olivera  Group:  Salvatore Olivas  Report Prepared By[de-identified]  Shay Jasper, BSN  Interpreting Physician:  Mike Ward MD    INDICATIONS: Evaluation for coronary artery disease  HISTORY: The patient is a [de-identified]year old  male  Chest pain status: no chest pain  Other symptoms: dyspnea and edema  Coronary artery disease risk factors: hypertension, family history of premature coronary artery disease, and  diabetes mellitus  Cardiovascular history: pericardial effusion  PHYSICAL EXAM: Baseline physical exam screening: no wheezes audible  REST ECG: Normal sinus rhythm  The ECG showed occasional premature ventricular contractions  PROCEDURE: The study was performed in the the Stress lab  A regadenoson infusion pharmacologic stress test was performed  Gated SPECT myocardial perfusion imaging was performed after stress and at rest  Systolic blood pressure was 187  mmHg, at the start of the study  Diastolic blood pressure was 80 mmHg, at the start of the study  The heart rate was 73 bpm, at the start of the study  IV double checked  Regadenoson protocol:  Time HR bpm SBP mmHg DBP mmHg Symptoms ST change Rhythm/conduct  Baseline 10:40 72 187 80 none none NSR, frequent PVC's  Immediate 10:48 77 188 72 dizziness -- same as above  1 min 10:49 81 172 72 same as above -- ventricular bigeminy  2 min 10:50 76 151 68 subsiding -- --  3 min 10:51 77 130 70 none -- ventricular bigeminy  4 min 10:52 75 140 70 none -- --  No medications or fluids given  STRESS SUMMARY: Duration of pharmacologic stress was 3 min  Maximal heart rate during stress was 87 bpm  The heart rate response to stress was normal  There was resting hypertension with an appropriate blood pressure response to stress  The rate-pressure product for the peak heart rate and blood pressure was 73223  There was no chest pain during stress  The stress test was terminated due to protocol completion  Pre oxygen saturation: 98 %  Peak oxygen saturation: 98 %  Arrhythmia during stress: isolated premature ventricular beats      ISOTOPE ADMINISTRATION:  Resting isotope administration Stress isotope administration  Agent Tetrofosmin Tetrofosmin  Dose 10 8 mCi 32 6 mCi  Date 01/14/2019 01/14/2019  Injection time 08:30 10:50    The radiopharmaceutical was injected at the peak effect of pharmacologic stress  MYOCARDIAL PERFUSION IMAGING:  The image quality was fair  The left ventricle was mildly dilated  The TID ratio was   9  PERFUSION DEFECTS:  -  There was a moderate-sized, moderately severe, fixed myocardial perfusion defect of the entire inferolateral wall  GATED SPECT:  The calculated left ventricular ejection fraction was 37 %  Left ventricular ejection fraction was moderately decreased by visual estimate  There was moderately reduced myocardial thickening and motion of the lateral wall of the left  ventricle  SUMMARY:  -  Stress results: There was resting hypertension with an appropriate blood pressure response to stress  There was no chest pain during stress  -  Perfusion imaging: There was a moderate-sized, moderately severe, fixed myocardial perfusion defect of the entire inferolateral wall  -  Gated SPECT: The calculated left ventricular ejection fraction was 37 %  Left ventricular ejection fraction was moderately decreased by visual estimate  There was moderately reduced myocardial thickening and motion of the lateral wall  of the left ventricle  -  Impressions and recommendations: Abnormal study after pharmacologic vasodilation with fixed inferolateral wall defect, no clear ischemia  EF 37%  Can not rule out old inferolateral wall infarction  IMPRESSIONS: Abnormal study after pharmacologic vasodilation with fixed inferolateral wall defect, no clear ischemia  EF 37%  Can not rule out old inferolateral wall infarction  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

## 2023-06-28 NOTE — PROGRESS NOTES
[facility] 43  Progress Note  Name: Lakeisha Cline  MRN: [id number]  Unit/Bed#: [address] [address] I Date of Admission: 6/23/2023   Date of Service: 6/28/2023 I Hospital Day: 5    Assessment/Plan   Pericardial effusion  Assessment & Plan  Patient has a large pericardial effusion  This has increased in size  No signs of tamponade  Patient would benefit from pericardial window  However patient has multiple comorbidities and low platelet count as well  Roman with cardiology and they recommend pericardial window  Patient will need to be transferred over to [facility] for this procedure  Awaiting discussion between cardiology and CT surgery at [facility] to considering transferring the patient to [facility]  Patient's son Krystyna Rivera is in agreement with considering to transfer the patient if he needs pericardial window  Thrombocytopenia ([facility] [address] )  Assessment & Plan  Possibly from sepsis  However patient is also on cefepime  Will consider discontinuing cefepime after a total of 7-day course of antibiotic as per pulmonary recommendations     Platelet count ranging between 58-48  * Septic shock ([address] )  Assessment & Plan  Possible source of infection was suspected to be parapneumonic effusion  Patient is on cefepime  Patient is on day #5 of cefepime  Pleural fluid culture negative so far  Acute kidney injury superimposed on chronic kidney disease [facility])  Assessment & Plan  Lab Results   Component Value Date    EGFR 20 06/28/2023    EGFR 17 06/27/2023    EGFR 15 06/26/2023    CREATININE 2 75 (H) 06/28/2023    CREATININE 3 05 (H) 06/27/2023    CREATININE 3 38 (H) 06/26/2023   Patient creatinine on admission was 3 7  Baseline  Is around 2 5-3 0  Creatinine now back to baseline  Re-started Torsemide 40 mg daily  Appetite good for patient       Chronic combined systolic and diastolic congestive heart failure [facility])  Assessment & Plan  Wt Readings from Last 3 Encounters:   06/28/23 74 5 kg (164 lb 3 9 oz)   05/11/23 66 7 kg (147 lb)   03/23/23 68 9 kg (152 lb)     Patient is on carvedilol, Imdur, and torsemide at home  Consulted cardiology for further evaluation  We will resume torsemide 40 mg daily today onwards  Decrease the dose of Imdur to 30 mg daily  Continue carvedilol 6 25 mg twice a day  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 [facility])  Assessment & Plan  Lab Results   Component Value Date    HGBA1C 8 3 (H) 02/23/2023       Recent Labs     06/27/23  1820 06/27/23  2055 06/28/23  0711 06/28/23  1112   POCGLU 155* 206* 196* 267*       Blood Sugar Average: Last 72 hrs:   (P) 151 [id number]     Accu-Chek now starting to rise again, will start Lantus 10 units every morning, and continue patient on low-dose sliding scale insulin with Accu-Chek 4 times daily  Right lower lobe consolidation [facility])  Assessment & Plan  Possible pneumonia  Treating the patient with cefepime for now  Continue DuoNeb  Pulmonary on board  Patient has a right-sided chest tube draining serosanguineous fluid  CT to water seal now  May consider discontinuing the chest tube as per pulmonary recommendations  Day # 5/7 of Cefepime today  Had a discussion with patient's sister at the bedside and she did mention that patient's social situation and capacity to give appropriate care at home with his current comorbid and clinical condition are not enough to take care of him appropriately  Patient could deteriorate at home  Hence we will urged the patient as well as the family members to consider discharge to rehab eventually  At present we are considering transferring the patient to [facility]     Pharmacologic VTE Prophylaxis: Yes Heparin sc  Mechanical VTE Prophylaxis in Place: No   Patient Centered Rounds: I have performed bedside rounds with the Nursing staff today     Current Length of Stay: 5 day(s)  Current Patient Status: Inpatient   Code Status: Level 3 - DNAR and DNI  Time Spent for Care:  35 minutes  More than 50% of total time spent on counseling and coordination of care as described above  Discussions with Specialists or Other Care Team Provider: Yes Cardiology, Pulmonary  Education and Discussions with Family / Patient: Yes, Updated family member  Sister at bedside, Son over the phone  Discharge Plan: > 48 to 72 hrs  Case Discussed with  regarding updating plan of care and disposition planning  Possible Tx to [facility]  Certification Statement: The patient will continue to require additional inpatient hospital stay due to Pericardial edema, thrombocytopenia, acute on chronic diastolic congestive heart failure, right-sided pneumonia status post loculated parapneumonic effusion, status post chest tube  Subjective:   I have seen and Examined the patient at the bedside  No CP or Sob  No fevers or chills, No nausea or vomiting  Overnight events reviewed with the RN  No Other complains  Denies any pain at the site of the chest tube insertion  Appetite is improving  Chest tube to waterseal   No respiratory distress  Saturating well on room air  Review of System:   Denies any CP or SOB  Denies any Cough or Cold  Denies any Fevers or chills  Denies any focal tingling numbness or weakness in any extremities  Denies any abdominal pain, Nausea or vomiting  Objective:   Temp (24hrs), Av 8 °F (36 °C), Min:96 °F (35 6 °C), Max:98 °F (36 7 °C)    Temp:  [96 °F (35 6 °C)-98 °F (36 7 °C)] 98 °F (36 7 °C)  HR:  [56-85] 85  Resp:  [16-19] 19  BP: (110-127)/(56-60) 126/60  SpO2:  [96 %-99 %] 99 %  Body mass index is 24 25 kg/m²  Input and Output Summary (last 24 hours):      Intake/Output Summary (Last 24 hours) at 2023 1341  Last data filed at 2023 0415  Gross per 24 hour   Intake --   Output 1740 ml   Net -1740 ml     I/O        07 07 07 07 07 07 P O  1040 240     I V  (mL/kg) 30 (0 4) 10 (0 1)     IV Piggyback 50      Total Intake(mL/kg) 1120 (15 8) 250 (3 4)     Urine (mL/kg/hr) 2150 (1 3) 1500 (0 8)     Chest Tube 540 240     Total Output 2690 1740     Net -1570 -1490            Unmeasured Urine Occurrence 1 x            Physical Exam:   General : Alert, Awake and oriented x 2,  NAD  Chronically ill looking  Neck : Supple  Eyes:  LORRAINE, EOMI  CVS : S1, S2, irregularly irregular heart rhythm   Distant heart sounds  R/S : Clear to auscultate anteriorly  Equal breath sound the right side up to mid lung zones  No wheezes appreciated no rhonchi appreciated  Abd: Soft, NT, ND  Bs+ve  Extremity: pedal edema noted  Skin: No acute Rash noted  But some bruising noted b/l legs  CNS: No acute FND  Confused  Additional Data:     Labs, Culture & Imaging Data Reviewed:    Results from last 7 days   Lab Units 06/28/23  1155   WBC Thousand/uL 6 50   HEMOGLOBIN g/dL 11 0*   HEMATOCRIT % 32 9*   PLATELETS Thousands/uL 49*     Results from last 7 days   Lab Units 06/28/23  1155   POTASSIUM mmol/L 5 2   CHLORIDE mmol/L 102   CO2 mmol/L 25   BUN mg/dL 65*   CREATININE mg/dL 2 75*   CALCIUM mg/dL 7 7*   ALK PHOS U/L 124*   ALT U/L 54*   AST U/L 38     Results from last 7 days   Lab Units 06/26/23  0600   INR  1 48*     Lab Results   Component Value Date    HGBA1C 8 3 (H) 02/23/2023      XR chest portable ICU   Final Result by Leyla Henry MD (06/26 2698)      Right-sided pleural drainage catheter is seen with a decreasing right effusion   No worsening   Residual right effusion/atelectasis seen   No pneumothorax seen               Workstation performed: [id number]         IR chest tube placement   Final Result by Dillon Christy DO (06/25 5530)   Right pigtail chest tube placement into minimally complex pleural effusion  Yellow fluid obtained and sent for laboratory analysis        _______________________________________________________________ COMPARISON: CT chest 6/24/2023      PROCEDURE DETAILS:   Operators: Dr Daisy Marina   Anesthesia: Local   Medications: 1% lidocaine   Contrast: 0 mL of Omnipaque 300   Fluoroscopy time: 0 minutes      COMMENTS:   The patient was placed in an upright position  A preprocedure timeout was performed per [facility] protocol  Ultrasound evaluation demonstrated a minimally complex right pleural effusion  The right back was prepped and draped in the usual sterile    fashion  All elements of maximal sterile barrier technique were followed (cap, mask, sterile gown, sterile gloves, large sterile sheet, hand hygiene, and 2% chlorhexidine for cutaneous antisepsis)  Lidocaine was administered to the skin, and a small skin incision    was made  Under direct ultrasound guidance, an 18 gauge needle was advanced into the pleural fluid  Guidewire was advanced through the needle, needle was removed and a 10 [place] pigtail chest tube was advanced over the wire with pigtail formed in the    pleural collection  Tube was then sutured the skin with 2-0 Prolene and connected to Pleur-evac  Sterile dressings were applied  Workstation performed: [id number]         CT chest abdomen pelvis wo contrast   Final Result by Hi Jenkins MD (06/24 0124)      1  Small right pleural effusion with thickened enhancing periphery which may represent empyema  Dense opacity within the right lower lobe may be due to atelectasis versus pneumonia  Opacity at the right lung base likely due to atelectasis  2   Large pericardial effusion  Anasarca  3   Cholelithiasis  Gallbladder wall thickening/fluid may be due to volume overload  Further evaluation with HIDA scan may be obtained if there is clinical concern for cholecystitis  4   Left inguinal hernia contains a loop of nonobstructed proximal sigmoid colon  5   Other findings as above  The study was marked in [facility] for immediate notification  "  Workstation performed: [id number]         XR chest 1 view portable   Final Result by Quin Kay MD (06/24 1750)      Enlargement of the cardiac silhouette due to cardiomegaly and pericardial effusion per CT      Right base opacity due to loculated right effusion and right lower lobe rounded atelectasis per CT  Workstation performed: [id number]             Cultures:   Blood Culture:   Lab Results   Component Value Date    BLOODCX No Growth After 4 Days   06/23/2023    BLOODCX Micrococcus luteus (A) 06/23/2023     Urine Culture: No results found for: \"URINECX\"  Sputum Culture: No components found for: \"SPUTUMCX\"  Wound Culture: No results found for: \"WOUNDCULT\"    Last 24 Hours Medication List:   Current Facility-Administered Medications   Medication Dose Route Frequency Provider Last Rate   • ascorbic acid  500 mg Oral Daily Chelsey Fischer MD     • atorvastatin  40 mg Oral Daily With Elder Rosales MD     • carbamide peroxide  5 drop Both Ears BID Chelsey Fischer MD     • cefepime  1,000 mg Intravenous Q12H Chelsey Fischer MD 1,000 mg (06/28/23 0843)   • chlorhexidine  15 mL Mouth/Throat Q12H [address] Chelsey Fischer MD     • cholecalciferol  2,000 Units Oral Daily Chelsey Fischer MD     • docusate sodium  100 mg Oral BID Chelsey Fischer MD     • heparin (porcine)  5,000 Units Subcutaneous Q8H [address] Chelsey Fischer MD     • insulin glargine  10 Units Subcutaneous QAM Chelsey Fischer MD     • insulin lispro  1-5 Units Subcutaneous TID AC Chelsey Fischer MD     • insulin lispro  1-5 Units Subcutaneous HS Chelsey Fischer MD     • ipratropium-albuterol  3 mL Nebulization Q6H Chelsey Fischer MD     • latanoprost  1 drop Both Eyes HS Chelsey Fischer MD     • pneumococcal 20-guillermina conj vacc  0 5 mL Intramuscular Prior to discharge Chelsey Fischer MD     • polyethylene glycol  17 g Oral Daily Chelsey Fischer MD     • senna  2 " tablet Oral HS Marcos Rios MD     • tamsulosin  0 4 mg Oral Daily With Brady Olvera MD     • timolol  1 drop Both Eyes Daily Marcos Rios MD     • torsemide  40 mg Oral Daily Marcos Rios MD           Patient is at moderate risk for morbidity and mortality due to above mentioned illness and comorbidities

## 2023-06-28 NOTE — Clinical Note
Prepped: right groin, right radial and right neck. Prepped with: ChloraPrep. The site was clipped. The patient was draped.

## 2023-06-28 NOTE — ASSESSMENT & PLAN NOTE
Possibly from sepsis  However patient is also on cefepime  Will consider discontinuing cefepime after a total of 7-day course of antibiotic as per pulmonary recommendations     Platelet count ranging between 58-48

## 2023-06-28 NOTE — QUICK NOTE
-Patient retaining 863cc of urine on bladder scan  He has previously been straight catheterized three times earlier today for urinary retention  [place] catheter placed per protocol for retention

## 2023-06-28 NOTE — CONSULTS
Юлия Gastelum [facility]  1940    HEMATOLOGY/ONCOLOGY CONSULTATION REPORT    DISCUSSION/SUMMARY:      The above was discussed with the patient; all questions were answered  Will follow with you  Please do not hesitate to contact me if you have any questions or need additional information  Thank you for this consult     _________________________________________________________________________________    Chief Complaint   Patient presents with   • Weakness - Generalized     Patient has a visiting nurse who told family to bring him in   Patient has had diarrhea since last Thursday, progressive weakness, and the nurse is worried about dehydration     History of Present Illness:      Review of Systems    Patient Active Problem List   Diagnosis   • Closed traumatic displaced fracture of distal end of left radius with malunion   • PVC (premature ventricular contraction)   • Essential hypertension   • Leg edema   • 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 ([facility])   • Pericardial effusion   • Chronic combined systolic and diastolic congestive heart failure ([facility])   • Vitamin D deficiency   • Cellulitis of left upper extremity   • Atrial fibrillation with slow ventricular response ([facility])   • COVID-19   • Electrolyte abnormality   • Acute kidney injury superimposed on chronic kidney disease (HCC)   • Benign hypertension with CKD (chronic kidney disease) stage IV ([facility])   • Persistent proteinuria   • Anemia   • Chronic kidney disease-mineral and bone disorder   • Advanced care planning/counseling discussion   • Hyponatremia   • Macrocytosis   • Urinary retention   • Septic shock ([facility])   • Thrombocytopenia ([facility])   • Diarrhea   • Hypothermia   • Right lower lobe consolidation ([address] )   • Hypoglycemia   • Empyema of lung ([facility])     Past Medical History:   Diagnosis Date   • Atrial fibrillation ([address] )    • Chronic kidney disease    • Coronary artery disease    • Diabetes mellitus ([address] )    • Hyperlipidemia    • Hypertension      Past Surgical History:   Procedure Laterality Date   • EYE SURGERY     • IR CHEST TUBE PLACEMENT  6/24/2023   • SKIN CANCER EXCISION     • TONSILLECTOMY       Family History   Problem Relation Age of Onset   • Heart disease Mother    • Diabetes Father    • Vision loss Father    • Cancer Sister    • Diabetes Sister      Social History     Socioeconomic History   • Marital status:      Spouse name: Not on file   • Number of children: 1   • Years of education: 15   • Highest education level: 12th grade   Occupational History   • Not on file   Tobacco Use   • Smoking status: Former   • Smokeless tobacco: Former   Vaping Use   • Vaping Use: Never used   Substance and Sexual Activity   • Alcohol use: Not Currently     Alcohol/week: 0 0 standard drinks of alcohol     Comment: special occasions   • Drug use: Not Currently   • Sexual activity: Not on file   Other Topics Concern   • Not on file   Social History Narrative   • Not on file     Social Determinants of Health     Financial Resource Strain: Not on file   Food Insecurity: No Food Insecurity (6/26/2023)    Hunger Vital Sign    • Worried About Running Out of Food in the Last Year: Never true    • Ran Out of Food in the Last Year: Never true   Transportation Needs: No Transportation Needs (6/26/2023)    PRAPARE - Transportation    • Lack of Transportation (Medical): No    • Lack of Transportation (Non-Medical):  No   Physical Activity: Not on file   Stress: Not on file   Social Connections: Not on file   Intimate Partner Violence: Not on file   Housing Stability: Low Risk  (6/26/2023)    Housing Stability Vital Sign    • Unable to Pay for Housing in the Last Year: No    • Number of Places Lived in the Last Year: 1    • Unstable Housing in the Last Year: No       Current Facility-Administered Medications:   •  ascorbic acid (VITAMIN C) tablet 500 mg, 500 mg, Oral, Daily, Ling Reyes MD, 500 mg at 06/28/23 0843  • atorvastatin (LIPITOR) tablet 40 mg, 40 mg, Oral, Daily With Judah Mitchell MD, 40 mg at 06/28/23 1613  •  cefepime (MAXIPIME) IVPB (premix in dextrose) 1,000 mg 50 mL, 1,000 mg, Intravenous, Q12H, Scott Alfonso MD, Last Rate: 100 mL/hr at 06/28/23 0843, 1,000 mg at 06/28/23 0843  •  chlorhexidine (PERIDEX) 0 12 % oral rinse 15 mL, 15 mL, Mouth/Throat, Q12H [address], Scott Alfonso MD, 15 mL at 06/28/23 0844  •  cholecalciferol (VITAMIN D3) tablet 2,000 Units, 2,000 Units, Oral, Daily, Scott Alfonso MD, 2,000 Units at 06/28/23 0843  •  docusate sodium (COLACE) capsule 100 mg, 100 mg, Oral, BID, Scott Alfonso MD, 100 mg at 06/28/23 1744  •  heparin (porcine) subcutaneous injection 5,000 Units, 5,000 Units, Subcutaneous, Q8H [address], Scott Alfonso MD, 5,000 Units at 06/28/23 1516  •  insulin glargine (LANTUS) subcutaneous injection 10 Units 0 1 mL, 10 Units, Subcutaneous, QAM, Scott Alfonso MD, 10 Units at 06/28/23 0844  •  insulin lispro (HumaLOG) 100 units/mL subcutaneous injection 1-5 Units, 1-5 Units, Subcutaneous, TID AC, 1 Units at 06/28/23 1742 **AND** Fingerstick Glucose (POCT), , , TID AC, Scott Alfonso MD  •  insulin lispro (HumaLOG) 100 units/mL subcutaneous injection 1-5 Units, 1-5 Units, Subcutaneous, HS, Scott Alfonso MD  •  ipratropium-albuterol (DUO-NEB) 0 5-2 5 mg/3 mL inhalation solution 3 mL, 3 mL, Nebulization, Q6H, Scott Alfonso MD, 3 mL at 06/28/23 1325  •  latanoprost (XALATAN) 0 005 % ophthalmic solution 1 drop, 1 drop, Both Eyes, HS, Scott Alfonso MD, 1 drop at 06/27/23 1677  •  pneumococcal 20-guillermina conj vacc (PREVNAR 20) IM Injection 0 5 mL, 0 5 mL, Intramuscular, Prior to discharge, Scott Alfonso MD  •  polyethylene glycol (MIRALAX) packet 17 g, 17 g, Oral, Daily, Scott Alfonso MD, 17 g at 06/28/23 0827  •  senna (SENOKOT) tablet 17 2 mg, 2 tablet, Oral, HS, Scott Alfonso MD, 17 2 mg at 06/27/23 2104  •  tamsulosin (FLOMAX) capsule 0 4 mg, 0 4 mg, Oral, Daily With Dinner, Hannah Choudhury MD, 0 4 mg at 06/28/23 1613  •  timolol (TIMOPTIC) 0 5 % ophthalmic solution 1 drop, 1 drop, Both Eyes, Daily, Hannah Choudhury MD, 1 drop at 06/28/23 0831  •  torsemide (DEMADEX) tablet 40 mg, 40 mg, Oral, Daily, Hannah Choudhury MD, 40 mg at 06/28/23 1516    Allergies   Allergen Reactions   • Elemental Sulfur    • Sulfa Antibiotics        Vitals:    06/28/23 1512   BP: 131/68   Pulse: 64   Resp: 16   Temp: (!) 97 3 °F (36 3 °C)   SpO2: 98%     Physical Exam  Constitutional:       Appearance: He is well-developed  HENT:      Head: Normocephalic and atraumatic  Right Ear: External ear normal       Left Ear: External ear normal    Eyes:      Conjunctiva/sclera: Conjunctivae normal       Pupils: Pupils are equal, round, and reactive to light  Cardiovascular:      Rate and Rhythm: Normal rate and regular rhythm  Heart sounds: Normal heart sounds  Pulmonary:      Effort: Pulmonary effort is normal       Breath sounds: Normal breath sounds  Abdominal:      General: Bowel sounds are normal       Palpations: Abdomen is soft  Musculoskeletal:         General: Normal range of motion  Cervical back: Normal range of motion and neck supple  Skin:     General: Skin is warm  Neurological:      Mental Status: He is alert and oriented to person, place, and time  Deep Tendon Reflexes: Reflexes are normal and symmetric  Psychiatric:         Behavior: Behavior normal          Thought Content:  Thought content normal          Judgment: Judgment normal          Extremities:   Lymphatics:     Labs      Imaging

## 2023-06-28 NOTE — Clinical Note
A [place] catheter was advanced under fluoroscopic guidance into the right heart and intra cardiac pressures were obtained. The catheter was secured in place for cardiac pressure monitoring in the nursing unit.

## 2023-06-28 NOTE — PLAN OF CARE
Problem: Potential for Falls  Goal: Patient will remain free of falls  Description: INTERVENTIONS:  - Educate patient/family on patient safety including physical limitations  - Instruct patient to call for assistance with activity   - Consult OT/PT to assist with strengthening/mobility   - Keep Call bell within reach  - Keep bed low and locked with side rails adjusted as appropriate  - Keep care items and personal belongings within reach  - Initiate and maintain comfort rounds  - Make Fall Risk Sign visible to staff  - Offer Toileting every 2 Hours, in advance of need  - Initiate/Maintain bed alarm  - Obtain necessary fall risk management equipment: yellow bracelet  - Apply yellow socks and bracelet for high fall risk patients  - Consider moving patient to room near nurses station  Outcome: Progressing     Problem: MOBILITY - ADULT  Goal: Maintain or return to baseline ADL function  Description: INTERVENTIONS:  -  Assess patient's ability to carry out ADLs; assess patient's baseline for ADL function and identify physical deficits which impact ability to perform ADLs (bathing, care of mouth/teeth, toileting, grooming, dressing, etc )  - Assess/evaluate cause of self-care deficits   - Assess range of motion  - Assess patient's mobility; develop plan if impaired  - Assess patient's need for assistive devices and provide as appropriate  - Encourage maximum independence but intervene and supervise when necessary  - Involve family in performance of ADLs  - Assess for home care needs following discharge   - Consider OT consult to assist with ADL evaluation and planning for discharge  - Provide patient education as appropriate  Outcome: Progressing  Goal: Maintains/Returns to pre admission functional level  Description: INTERVENTIONS:  - Perform BMAT or MOVE assessment daily    - Set and communicate daily mobility goal to care team and patient/family/caregiver     - Collaborate with rehabilitation services on mobility goals if consulted  - Perform Range of Motion 4 times a day  - Reposition patient every 2 hours    - Dangle patient 3 times a day  - Stand patient 3 times a day  - Ambulate patient 3 times a day  - Out of bed to chair 3 times a day   - Out of bed for meals 3 times a day  - Out of bed for toileting  - Record patient progress and toleration of activity level   Outcome: Progressing     Problem: Prexisting or High Potential for Compromised Skin Integrity  Goal: Skin integrity is maintained or improved  Description: INTERVENTIONS:  - Identify patients at risk for skin breakdown  - Assess and monitor skin integrity  - Assess and monitor nutrition and hydration status  - Monitor labs   - Assess for incontinence   - Turn and reposition patient  - Assist with mobility/ambulation  - Relieve pressure over bony prominences  - Avoid friction and shearing  - Provide appropriate hygiene as needed including keeping skin clean and dry  - Evaluate need for skin moisturizer/barrier cream  - Collaborate with interdisciplinary team   - Patient/family teaching  - Consider wound care consult   Outcome: Progressing     Problem: PAIN - ADULT  Goal: Verbalizes/displays adequate comfort level or baseline comfort level  Description: Interventions:  - Encourage patient to monitor pain and request assistance  - Assess pain using appropriate pain scale  - Administer analgesics based on type and severity of pain and evaluate response  - Implement non-pharmacological measures as appropriate and evaluate response  - Consider cultural and social influences on pain and pain management  - Notify physician/advanced practitioner if interventions unsuccessful or patient reports new pain  Outcome: Progressing

## 2023-06-28 NOTE — ASSESSMENT & PLAN NOTE
Lab Results   Component Value Date    EGFR 20 06/28/2023    EGFR 17 06/27/2023    EGFR 15 06/26/2023    CREATININE 2 75 (H) 06/28/2023    CREATININE 3 05 (H) 06/27/2023    CREATININE 3 38 (H) 06/26/2023   Patient creatinine on admission was 3 7  Baseline  Is around 2 5-3 0  Creatinine now back to baseline  Re-started Torsemide 40 mg daily  Appetite good for patient

## 2023-06-28 NOTE — ASSESSMENT & PLAN NOTE
Wt Readings from Last 3 Encounters:   06/28/23 74 5 kg (164 lb 3 9 oz)   05/11/23 66 7 kg (147 lb)   03/23/23 68 9 kg (152 lb)     Patient is on carvedilol, Imdur, and torsemide at home  Consulted cardiology for further evaluation  We will resume torsemide 40 mg daily today onwards  Decrease the dose of Imdur to 30 mg daily  Continue carvedilol 6 25 mg twice a day

## 2023-06-28 NOTE — ASSESSMENT & PLAN NOTE
Patient has a large pericardial effusion  This has increased in size  No signs of tamponade  Patient would benefit from pericardial window  However patient has multiple comorbidities and low platelet count as well  Roman with cardiology and they recommend pericardial window  Patient will need to be transferred over to [facility] for this procedure  Awaiting discussion between cardiology and CT surgery at [facility] to considering transferring the patient to [facility]  Patient's son [facility] is in agreement with considering to transfer the patient if he needs pericardial window

## 2023-06-28 NOTE — EMTALA/ACUTE CARE TRANSFER
[address] [address]  Dept: [phone]      ACUTE CARE TRANSFER CONSENT    NAME Harshal Anne                                         1940                              MRN [id number]    I have been informed of my rights regarding examination, treatment, and transfer   by Dr Barbara Gomez MD    Benefits:  Facility with capabilities for drainage for large pericardial effusion; If not drained concern that this could lead to tamponade  Risks:  Irreversible brain damage, death      Consent for Transfer:  I acknowledge that my medical condition has been evaluated and explained to me by the treating physician or other qualified medical person and/or my attending physician, who has recommended that I be transferred to the service of    at    The above potential benefits of such transfer, the potential risks associated with such transfer, and the probable risks of not being transferred have been explained to me, and I fully understand them  The doctor has explained that, in my case, the benefits of transfer outweigh the risks  I agree to be transferred  I authorize the performance of emergency medical procedures and treatments upon me in both transit and upon arrival at the receiving facility  Additionally, I authorize the release of any and all medical records to the receiving facility and request they be transported with me, if possible  I understand that the safest mode of transportation during a medical emergency is an ambulance and that the Hospital advocates the use of this mode of transport  Risks of traveling to the receiving facility by car, including absence of medical control, life sustaining equipment, such as oxygen, and medical personnel has been explained to me and I fully understand them  (DAVIN CORRECT BOX BELOW)  [  ]  I consent to the stated transfer and to be transported by ambulance/helicopter    [  ]  I consent to the stated transfer, but refuse transportation by ambulance and accept full responsibility for my transportation by car  I understand the risks of non-ambulance transfers and I exonerate the Hospital and its staff from any deterioration in my condition that results from this refusal     X___________________________________________    DATE  23  TIME________  Signature of patient or legally responsible individual signing on patient behalf           RELATIONSHIP TO PATIENT_________________________          Provider Certification    NAME Rae Melgar                                         1940                              MRN [id number]    A medical screening exam was performed on the above named patient  Based on the examination:    Condition Necessitating Transfer Large Pericardial Effusion    Patient Condition:  Stable    Reason for Transfer:  Facility with capabilities for Pericardial Window     Transfer Requirements: Facility     · Space available and qualified personnel available for treatment as acknowledged by    · Agreed to accept transfer and to provide appropriate medical treatment as acknowledged by          · Appropriate medical records of the examination and treatment of the patient are provided at the time of transfer   [address] [facility], [address] _______  · Transfer will be performed by qualified personnel from    and appropriate transfer equipment as required, including the use of necessary and appropriate life support measures      Provider Certification: I have examined the patient and explained the following risks and benefits of being transferred/refusing transfer to the patient/family:         Based on these reasonable risks and benefits to the patient and/or the unborn child(sun), and based upon the information available at the time of the patient’s examination, I certify that the medical benefits reasonably to be expected from the provision of appropriate medical treatments at another medical facility outweigh the increasing risks, if any, to the individual’s medical condition, and in the case of labor to the unborn child, from effecting the transfer      X____________________________________________ DATE 06/28/23        TIME_______      ORIGINAL - SEND TO MEDICAL RECORDS   COPY - SEND WITH PATIENT DURING TRANSFER

## 2023-06-28 NOTE — ASSESSMENT & PLAN NOTE
Lab Results   Component Value Date    HGBA1C 8 3 (H) 02/23/2023       Recent Labs     06/27/23  1820 06/27/23 2055 06/28/23  0711 06/28/23  1112   POCGLU 155* 206* 196* 267*       Blood Sugar Average: Last 72 hrs:   (P) 441 [id number]     Accu-Chek now starting to rise again, will start Lantus 10 units every morning, and continue patient on low-dose sliding scale insulin with Accu-Chek 4 times daily

## 2023-06-28 NOTE — ASSESSMENT & PLAN NOTE
Possible source of infection was suspected to be parapneumonic effusion  Patient is on cefepime  Patient is on day #5 of cefepime  Pleural fluid culture negative so far

## 2023-06-28 NOTE — ASSESSMENT & PLAN NOTE
Possible pneumonia  Treating the patient with cefepime for now  Continue DuoNeb  Pulmonary on board  Patient has a right-sided chest tube draining serosanguineous fluid  CT to water seal now  May consider discontinuing the chest tube as per pulmonary recommendations  Day # 5/7 of Cefepime today

## 2023-06-28 NOTE — PROGRESS NOTES
"Progress Note - Pulmonary   Olivia Knight 78 y o  male MRN: [id number]  Unit/Bed#: [address] [place] [address] Encounter: [id number]    Assessment/Plan:    1  Right-sided pleural effusion status post chest tube  -Chest tube placed by IR on 6/24  - s/p 1 dose of tPA/dornase on 6/25  -Total drainage since placement around 1180 mL of serosanguineous fluid  -Patient had about 270 mL drained since yesterday morning at 6 AM  -Pleural fluid culture shows no growth of bacteria  -Negative for carcinoma  -Lymphocytic dominant  - LDH 44, protein <2 0  -Continue chest tube until drainage less than 150 for 24 hours    2  Abnormal CT of chest  -Concern for right lower lobe pneumonia with moderate effusion on admission  -Patient met sepsis criteria  -Continue cefepime to complete a total of 7 days, today is day #6/7  -Strep/Legionella negative  - Pulmonary toilet: cough and deep breathe, incentive spirometer, OOB to chair as tolerated    3  Pericardial effusion  -This is chronic but increased on recent echo without evidence of tamponade  -Cardiology consulted  -Patient is to be transferred to [facility] for pericardial window    4  Chronic combined systolic and diastolic congestive heart failure  - BNP elevated at 141 on admission  -Cardiology following  -40 mg p o  torsemide added daily  -Continue strict I&Os and daily weights    5  Pulmonary hypertension  - PASP 40-45 mmHg  -Cardiology following  -Continue strict I&O's and daily weights    Discussed with PA-C with cardiology and primary RN  Chief Complaint:    \"I'm feeling okay  \"    Subjective:    Patient seen and examined laying in bed  He has no complaints at this time  His oxygen saturation is 98% on room air  Objective:    Vitals: Blood pressure 131/68, pulse 64, temperature (!) 97 3 °F (36 3 °C), resp  rate 16, height 5' 9\" (1 753 m), weight 74 5 kg (164 lb 3 9 oz), SpO2 98 %  RA,Body mass index is 24 25 kg/m²        Intake/Output Summary (Last 24 hours) at " 6/28/2023 1534  Last data filed at 6/28/2023 1422  Gross per 24 hour   Intake --   Output 2290 ml   Net -2290 ml       Invasive Devices     Peripheral Intravenous Line  Duration           Peripheral IV 06/23/23 Left Forearm 4 days    Peripheral IV 06/23/23 Right Forearm 4 days    Long-Dwell Peripheral IV (Midline) 07/58/17 Right Basilic 3 days          Drain  Duration           Chest Tube Right 10 2 Fr  3 days    Urethral Catheter 16 Fr  <1 day                Physical Exam  Vitals reviewed  Constitutional:       General: He is not in acute distress  Appearance: He is not ill-appearing or toxic-appearing  HENT:      Head: Normocephalic and atraumatic  Nose: Nose normal       Mouth/Throat:      Pharynx: Oropharynx is clear  Eyes:      Conjunctiva/sclera: Conjunctivae normal    Cardiovascular:      Rate and Rhythm: Normal rate and regular rhythm  Heart sounds: Heart sounds are distant  Pulmonary:      Effort: Pulmonary effort is normal  No tachypnea, accessory muscle usage, respiratory distress or retractions  Breath sounds: No stridor or decreased air movement  Examination of the right-lower field reveals decreased breath sounds  Decreased breath sounds present  No wheezing, rhonchi or rales  Chest:      Chest wall: No tenderness  Abdominal:      Palpations: Abdomen is soft  Musculoskeletal:      Cervical back: Normal range of motion and neck supple  Right lower leg: Edema present  Left lower leg: Edema present  Skin:     General: Skin is warm and dry  Neurological:      General: No focal deficit present  Mental Status: He is alert  Mental status is at baseline  Psychiatric:         Mood and Affect: Mood normal          Behavior: Behavior normal          Labs: I have personally reviewed pertinent lab results  Imaging and other studies: I have personally reviewed pertinent reports

## 2023-06-28 NOTE — QUICK NOTE
Patient was accepted to [address] [place] [address] for transfer for pericardial window  Notified patient and his son Oly Handy about the transfer to [facility] and agreeable for the same

## 2023-06-29 ENCOUNTER — APPOINTMENT (INPATIENT)
Dept: NON INVASIVE DIAGNOSTICS | Facility: HOSPITAL | Age: 83
DRG: 314 | End: 2023-06-29
Payer: MEDICARE

## 2023-06-29 LAB
ALBUMIN SERPL BCP-MCNC: 2.3 G/DL (ref 3.5–5)
ALP SERPL-CCNC: 145 U/L (ref 46–116)
ALT SERPL W P-5'-P-CCNC: 65 U/L (ref 12–78)
ANION GAP SERPL CALCULATED.3IONS-SCNC: 3 MMOL/L
APICAL FOUR CHAMBER EJECTION FRACTION: 48 %
AST SERPL W P-5'-P-CCNC: 42 U/L (ref 5–45)
ATRIAL RATE: 73 BPM
BACTERIA BLD CULT: NORMAL
BASOPHILS # BLD AUTO: 0.01 THOUSANDS/ÂΜL (ref 0–0.1)
BASOPHILS NFR BLD AUTO: 0 % (ref 0–1)
BILIRUB SERPL-MCNC: 0.86 MG/DL (ref 0.2–1)
BUN SERPL-MCNC: 71 MG/DL (ref 5–25)
CALCIUM ALBUM COR SERPL-MCNC: 9.7 MG/DL (ref 8.3–10.1)
CALCIUM SERPL-MCNC: 8.3 MG/DL (ref 8.3–10.1)
CHLORIDE SERPL-SCNC: 104 MMOL/L (ref 96–108)
CO2 SERPL-SCNC: 28 MMOL/L (ref 21–32)
CREAT SERPL-MCNC: 2.75 MG/DL (ref 0.6–1.3)
EOSINOPHIL # BLD AUTO: 0.08 THOUSAND/ÂΜL (ref 0–0.61)
EOSINOPHIL NFR BLD AUTO: 1 % (ref 0–6)
ERYTHROCYTE [DISTWIDTH] IN BLOOD BY AUTOMATED COUNT: 15.2 % (ref 11.6–15.1)
GFR SERPL CREATININE-BSD FRML MDRD: 20 ML/MIN/1.73SQ M
GLUCOSE SERPL-MCNC: 152 MG/DL (ref 65–140)
GLUCOSE SERPL-MCNC: 171 MG/DL (ref 65–140)
GLUCOSE SERPL-MCNC: 171 MG/DL (ref 65–140)
GLUCOSE SERPL-MCNC: 174 MG/DL (ref 65–140)
GLUCOSE SERPL-MCNC: 177 MG/DL (ref 65–140)
HCT VFR BLD AUTO: 34.6 % (ref 36.5–49.3)
HGB BLD-MCNC: 11.4 G/DL (ref 12–17)
IMM GRANULOCYTES # BLD AUTO: 0.1 THOUSAND/UL (ref 0–0.2)
IMM GRANULOCYTES NFR BLD AUTO: 1 % (ref 0–2)
INTERVENTRICULAR SEPTUM IN DIASTOLE (PARASTERNAL SHORT AXIS VIEW): 1.3 CM
IVC: 11 MM
LAAS-AP2: 18 CM2
LAAS-AP4: 20.5 CM2
LEFT ATRIUM VOLUME (MOD BIPLANE): 51 ML
LEFT VENTRICULAR INTERNAL DIMENSION IN DIASTOLE: 4 CM (ref 3.5–6)
LEFT VENTRICULAR POSTERIOR WALL IN END DIASTOLE: 1.3 CM
LYMPHOCYTES # BLD AUTO: 1.32 THOUSANDS/ÂΜL (ref 0.6–4.47)
LYMPHOCYTES NFR BLD AUTO: 17 % (ref 14–44)
MCH RBC QN AUTO: 34.2 PG (ref 26.8–34.3)
MCHC RBC AUTO-ENTMCNC: 32.9 G/DL (ref 31.4–37.4)
MCV RBC AUTO: 104 FL (ref 82–98)
MONOCYTES # BLD AUTO: 0.91 THOUSAND/ÂΜL (ref 0.17–1.22)
MONOCYTES NFR BLD AUTO: 12 % (ref 4–12)
NEUTROPHILS # BLD AUTO: 5.41 THOUSANDS/ÂΜL (ref 1.85–7.62)
NEUTS SEG NFR BLD AUTO: 69 % (ref 43–75)
NRBC BLD AUTO-RTO: 0 /100 WBCS
PLATELET # BLD AUTO: 55 THOUSANDS/UL (ref 149–390)
PMV BLD AUTO: 10 FL (ref 8.9–12.7)
POTASSIUM SERPL-SCNC: 4.8 MMOL/L (ref 3.5–5.3)
PROT SERPL-MCNC: 5.8 G/DL (ref 6.4–8.4)
QRS AXIS: 26 DEGREES
QRSD INTERVAL: 82 MS
QT INTERVAL: 352 MS
QTC INTERVAL: 387 MS
RA PRESSURE ESTIMATED: 15 MMHG
RBC # BLD AUTO: 3.33 MILLION/UL (ref 3.88–5.62)
RIGHT ATRIUM AREA SYSTOLE A4C: 16.6 CM2
RV PSP: 51 MMHG
SL CV ECHO PERICARDIAL EFFUSION SIZE: 3.8 CM
SL CV LEFT ATRIUM LENGTH A2C: 5.8 CM
SL CV LV EF: 70
SODIUM SERPL-SCNC: 135 MMOL/L (ref 135–147)
T WAVE AXIS: 255 DEGREES
TR MAX PG: 36 MMHG
TR PEAK VELOCITY: 3 M/S
TRICUSPID VALVE PEAK REGURGITATION VELOCITY: 2.99 M/S
VENTRICULAR RATE: 73 BPM
WBC # BLD AUTO: 7.83 THOUSAND/UL (ref 4.31–10.16)

## 2023-06-29 PROCEDURE — 82948 REAGENT STRIP/BLOOD GLUCOSE: CPT

## 2023-06-29 PROCEDURE — 99223 1ST HOSP IP/OBS HIGH 75: CPT | Performed by: INTERNAL MEDICINE

## 2023-06-29 PROCEDURE — 93010 ELECTROCARDIOGRAM REPORT: CPT | Performed by: INTERNAL MEDICINE

## 2023-06-29 PROCEDURE — 80053 COMPREHEN METABOLIC PANEL: CPT | Performed by: NURSE PRACTITIONER

## 2023-06-29 PROCEDURE — 99233 SBSQ HOSP IP/OBS HIGH 50: CPT | Performed by: INTERNAL MEDICINE

## 2023-06-29 PROCEDURE — 93005 ELECTROCARDIOGRAM TRACING: CPT

## 2023-06-29 PROCEDURE — 93321 DOPPLER ECHO F-UP/LMTD STD: CPT

## 2023-06-29 PROCEDURE — 85025 COMPLETE CBC W/AUTO DIFF WBC: CPT | Performed by: NURSE PRACTITIONER

## 2023-06-29 PROCEDURE — 93325 DOPPLER ECHO COLOR FLOW MAPG: CPT

## 2023-06-29 PROCEDURE — 93308 TTE F-UP OR LMTD: CPT

## 2023-06-29 PROCEDURE — 99223 1ST HOSP IP/OBS HIGH 75: CPT | Performed by: THORACIC SURGERY (CARDIOTHORACIC VASCULAR SURGERY)

## 2023-06-29 RX ORDER — FUROSEMIDE 10 MG/ML
40 INJECTION INTRAMUSCULAR; INTRAVENOUS ONCE
Status: DISCONTINUED | OUTPATIENT
Start: 2023-06-29 | End: 2023-06-29

## 2023-06-29 RX ADMIN — DOCUSATE SODIUM 100 MG: 100 CAPSULE, LIQUID FILLED ORAL at 17:05

## 2023-06-29 RX ADMIN — ATORVASTATIN CALCIUM 40 MG: 40 TABLET, FILM COATED ORAL at 17:05

## 2023-06-29 RX ADMIN — TIMOLOL MALEATE 1 DROP: 5 SOLUTION/ DROPS OPHTHALMIC at 11:33

## 2023-06-29 RX ADMIN — INSULIN LISPRO 1 UNITS: 100 INJECTION, SOLUTION INTRAVENOUS; SUBCUTANEOUS at 11:34

## 2023-06-29 RX ADMIN — HEPARIN SODIUM 5000 UNITS: 5000 INJECTION INTRAVENOUS; SUBCUTANEOUS at 13:46

## 2023-06-29 RX ADMIN — HEPARIN SODIUM 5000 UNITS: 5000 INJECTION INTRAVENOUS; SUBCUTANEOUS at 21:39

## 2023-06-29 RX ADMIN — CEFEPIME 1000 MG: 1 INJECTION, POWDER, FOR SOLUTION INTRAMUSCULAR; INTRAVENOUS at 09:28

## 2023-06-29 RX ADMIN — DOCUSATE SODIUM 100 MG: 100 CAPSULE, LIQUID FILLED ORAL at 09:21

## 2023-06-29 RX ADMIN — TORSEMIDE 40 MG: 20 TABLET ORAL at 09:21

## 2023-06-29 RX ADMIN — INSULIN LISPRO 1 UNITS: 100 INJECTION, SOLUTION INTRAVENOUS; SUBCUTANEOUS at 21:42

## 2023-06-29 RX ADMIN — HEPARIN SODIUM 5000 UNITS: 5000 INJECTION INTRAVENOUS; SUBCUTANEOUS at 05:59

## 2023-06-29 RX ADMIN — TAMSULOSIN HYDROCHLORIDE 0.4 MG: 0.4 CAPSULE ORAL at 17:05

## 2023-06-29 RX ADMIN — INSULIN LISPRO 1 UNITS: 100 INJECTION, SOLUTION INTRAVENOUS; SUBCUTANEOUS at 17:05

## 2023-06-29 RX ADMIN — LATANOPROST 1 DROP: 50 SOLUTION OPHTHALMIC at 21:42

## 2023-06-29 RX ADMIN — OXYCODONE HYDROCHLORIDE AND ACETAMINOPHEN 500 MG: 500 TABLET ORAL at 09:21

## 2023-06-29 NOTE — ASSESSMENT & PLAN NOTE
· Improving, patient was at ICU in [facility] requiring pressors  · Source suspected to be pneumonia with parapneumonic effusion. No growth on pleural fluid culture.   Micrococcus in 1/2 blood culture, likely contaminant  · Patient on day 5/7 of cefepime

## 2023-06-29 NOTE — PLAN OF CARE
Problem: Prexisting or High Potential for Compromised Skin Integrity  Goal: Skin integrity is maintained or improved  Description: INTERVENTIONS:  - Identify patients at risk for skin breakdown  - Assess and monitor skin integrity  - Assess and monitor nutrition and hydration status  - Monitor labs   - Assess for incontinence   - Turn and reposition patient  - Assist with mobility/ambulation  - Relieve pressure over bony prominences  - Avoid friction and shearing  - Provide appropriate hygiene as needed including keeping skin clean and dry  - Evaluate need for skin moisturizer/barrier cream  - Collaborate with interdisciplinary team   - Patient/family teaching  - Consider wound care consult   6/29/2023 1035 by Gigi Vallecillo RN  Outcome: Progressing  6/29/2023 1035 by Gigi Vallecillo RN  Outcome: Progressing

## 2023-06-29 NOTE — PROGRESS NOTES
[facility]  Progress Note  Name: Demario Hernandes  MRN: [id number]  Unit/Bed#: [facility] 426-01 I Date of Admission: 6/28/2023   Date of Service: 6/29/2023 I Hospital Day: 1    Assessment/Plan   * Pericardial effusion  Assessment & Plan  · Appears to been noted as far back as 2019 and is increasing in size  · Echocardiogram 6/24 noted worsening effusion with no signs of tamponade  · Discussed case with thoracic surgery via [place] text. No clinical concerns for tamponade noted. We will consult cardiology for potential pericardiocentesis. Discussed case with cardiology. · Fortunately patient is nontoxic    Empyema of lung ([address])  Assessment & Plan  · Status post chest tube, patient appears to be tolerating well  · Was on waterseal for transfer but ordered to be back on suction  · Thoracic surgery consulted  · No significant growth on pleural fluid culture    Anemia  Assessment & Plan  · Hemoglobin 11.0 today, no acute bleed suspected, CBC in the morning    Acute kidney injury superimposed on chronic kidney disease [facility])  Assessment & Plan  Lab Results   Component Value Date    EGFR 20 06/29/2023    EGFR 20 06/28/2023    EGFR 17 06/27/2023    CREATININE 2.75 (H) 06/29/2023    CREATININE 2.75 (H) 06/28/2023    CREATININE 3.05 (H) 06/27/2023   · Resolving, likely secondary to overdiuresis and septic shock  · Repeat BMP in the morning    Atrial fibrillation with slow ventricular response (HCC)  Assessment & Plan  · Rate controlled on carvedilol. Eliquis on hold for possible pericardial window    Chronic combined systolic and diastolic congestive heart failure (HCC)  Assessment & Plan  Wt Readings from Last 3 Encounters:   06/29/23 74.9 kg (165 lb 3.2 oz)   06/28/23 74.5 kg (164 lb 3.9 oz)   05/11/23 66.7 kg (147 lb)     · Diuretics held on admission in the setting of shock and KARINA, torsemide restarted and we will continue it for now.   · I/O, daily weight, low-sodium diet with fluid restriction when patient can resume diet           VTE Pharmacologic Prophylaxis:   Pharmacologic: Heparin  Mechanical VTE Prophylaxis in Place: No    Patient Centered Rounds: I have performed bedside rounds with nursing staff today. Time Spent for Care: 54. More than 50% of total time spent on counseling and coordination of care as described above. Current Length of Stay: 1 day(s)        Code Status: Level 3 - DNAR and DNI      Subjective:   nad    Objective:     Vitals:   Temp (24hrs), Av.7 °F (36.5 °C), Min:97.3 °F (36.3 °C), Max:98.1 °F (36.7 °C)    Temp:  [97.3 °F (36.3 °C)-98.1 °F (36.7 °C)] 97.6 °F (36.4 °C)  HR:  [62-92] 80  Resp:  [14-20] 16  BP: (104-152)/(64-89) 142/81  SpO2:  [97 %-99 %] 97 %  Body mass index is 24.4 kg/m². Input and Output Summary (last 24 hours): Intake/Output Summary (Last 24 hours) at 2023 0841  Last data filed at 2023 0600  Gross per 24 hour   Intake 50 ml   Output 1920 ml   Net -1870 ml       Physical Exam:     Physical Exam  HENT:      Head: Normocephalic and atraumatic. Cardiovascular:      Rate and Rhythm: Normal rate and regular rhythm. Heart sounds: No murmur heard. No friction rub. Pulmonary:      Effort: Pulmonary effort is normal.      Breath sounds: Normal breath sounds. Neurological:      General: No focal deficit present. Mental Status: He is oriented to person, place, and time.    Psychiatric:         Mood and Affect: Mood normal.         Behavior: Behavior normal.         Additional Data:     Labs:    Results from last 7 days   Lab Units 23  0209   WBC Thousand/uL 7.83   HEMOGLOBIN g/dL 11.4*   HEMATOCRIT % 34.6*   PLATELETS Thousands/uL 55*   NEUTROS PCT % 69   LYMPHS PCT % 17   MONOS PCT % 12   EOS PCT % 1     Results from last 7 days   Lab Units 23  0209   POTASSIUM mmol/L 4.8   CHLORIDE mmol/L 104   CO2 mmol/L 28   BUN mg/dL 71*   CREATININE mg/dL 2.75*   CALCIUM mg/dL 8.3   ALK PHOS U/L 145*   ALT U/L 65   AST U/L 42     Results from last 7 days   Lab Units 06/26/23  0600   INR  1.48*       * I Have Reviewed All Lab Data Listed Above. * Additional Pertinent Lab Tests Reviewed: All Labs Within Last 24 Hours Reviewed      Recent Cultures (last 7 days):     Results from last 7 days   Lab Units 06/24/23  1719 06/24/23  0155 06/23/23  2202 06/23/23  2153   BLOOD CULTURE   --   --  No Growth After 5 Days. Micrococcus luteus*   GRAM STAIN RESULT  Rare Polys  No bacteria seen  --   --  Gram positive cocci in clusters*   BODY FLUID CULTURE, STERILE  No growth  --   --   --    LEGIONELLA URINARY ANTIGEN   --  Negative  --   --        Last 24 Hours Medication List:   Current Facility-Administered Medications   Medication Dose Route Frequency Provider Last Rate   • ascorbic acid  500 mg Oral Daily Louie Chavarria     • atorvastatin  40 mg Oral Daily With Dinner Louie Chavarria     • cefepime  1,000 mg Intravenous Q12H Louie Chavarria Stopped (06/28/23 2322)   • cholecalciferol  2,000 Units Oral Daily Louie Chavarria     • docusate sodium  100 mg Oral BID Louie Chavarria     • heparin (porcine)  5,000 Units Subcutaneous Q8H [facility] Louie Chavarria     • insulin lispro  1-5 Units Subcutaneous TID AC Louie Chavarria     • insulin lispro  1-5 Units Subcutaneous HS Louie Chavarria     • latanoprost  1 drop Both Eyes HS Louie Chavarria     • polyethylene glycol  17 g Oral Daily Louie Chavarria     • senna  2 tablet Oral HS Louie Chavarria     • tamsulosin  0.4 mg Oral Daily With Luz Toney     • timolol  1 drop Both Eyes Daily Louie Chavarria     • torsemide  40 mg Oral Daily Louie Chavarria          Today, Patient Was Seen By: Lucila Aschoff, DO    ** Please Note: Dictation voice to text software may have been used in the creation of this document.  **

## 2023-06-29 NOTE — ASSESSMENT & PLAN NOTE
· Appears to been noted as far back as 2019 and is increasing in size  · Echocardiogram 6/24 noted worsening effusion with no signs of tamponade  · Clinically at bedside no signs of tamponade and patient is hemodynamically stable based on vitals prior to transfer  · Hospitalist team discussed with cardiology and they recommended transfer for evaluation  · Thoracic surgery consult placed

## 2023-06-29 NOTE — H&P
[address] [place] [address]  H&P  Name: Ankit Mohamud 78 y.o. male I MRN: [id number]  [facility] I Date of Admission: 6/28/2023   Date of Service: 6/28/2023 I Hospital Day: 0      Assessment/Plan   Empyema of lung [facility])  Assessment & Plan  · Status post chest tube, patient appears to be tolerating well  · Was on waterseal for transfer but ordered to be back on suction  · Thoracic surgery consulted  · No significant growth on pleural fluid culture    Thrombocytopenia ([address])  Assessment & Plan  · Present on admission, patient appears to have baseline thrombocytopenia  · Low suspicion for HIT, 4T score would be 0-1 by my calculation  · Likely secondary to sepsis, critical illness  · Peripheral smear did not note any schistocytes or other concerning findings of TMA/TTP  · Holding full dose anticoagulation proceeding with subcu heparin, repeat CBC in the morning. CT to evaluate for pericardial window    Septic shock [facility])  Assessment & Plan  · Improving, patient was at ICU in [facility] requiring pressors  · Source suspected to be pneumonia with parapneumonic effusion. No growth on pleural fluid culture. Micrococcus in 1/2 blood culture, likely contaminant  · Patient on day 5/7 of cefepime    Urinary retention  Assessment & Plan  · Dupont placed for urinary retention, can consider void trial as patient convalesces  · Does have history of BPH, continue Flomax.   Consider reaching out to urology for outpatient follow-up    Anemia  Assessment & Plan  · Hemoglobin 11.0 today, no acute bleed suspected, CBC in the morning    Acute kidney injury superimposed on chronic kidney disease [facility])  Assessment & Plan  Lab Results   Component Value Date    EGFR 20 06/28/2023    EGFR 17 06/27/2023    EGFR 15 06/26/2023    CREATININE 2.75 (H) 06/28/2023    CREATININE 3.05 (H) 06/27/2023    CREATININE 3.38 (H) 06/26/2023   · Resolving, likely secondary to overdiuresis and septic shock  · Repeat BMP in the morning    Atrial fibrillation with slow ventricular response (HCC)  Assessment & Plan  · Rate controlled on carvedilol. Eliquis on hold for possible pericardial window    Chronic combined systolic and diastolic congestive heart failure (HCC)  Assessment & Plan  Wt Readings from Last 3 Encounters:   06/28/23 74.5 kg (164 lb 3.9 oz)   05/11/23 66.7 kg (147 lb)   03/23/23 68.9 kg (152 lb)     · Diuretics held on admission in the setting of shock and KARINA, torsemide restarted and we will continue it for now. · I/O, daily weight, low-sodium diet with fluid restriction when patient can resume diet        Pericardial effusion  Assessment & Plan  · Appears to been noted as far back as 2019 and is increasing in size  · Echocardiogram 6/24 noted worsening effusion with no signs of tamponade  · Clinically at bedside no signs of tamponade and patient is hemodynamically stable based on vitals prior to transfer  · Hospitalist team discussed with cardiology and they recommended transfer for evaluation  · Thoracic surgery consult placed    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 [facility])  Assessment & Plan  Lab Results   Component Value Date    HGBA1C 8.3 (H) 02/23/2023       Recent Labs     06/28/23  0711 06/28/23  1112 06/28/23  1618 06/28/23  2205   POCGLU 196* 267* 209* 183*       Blood Sugar Average: Last 72 hrs:  · Holding dose of Lantus 10 tonight as patient will be n.p.o. for possible pericardial window. Continue ISS         VTE Pharmacologic Prophylaxis:   Moderate Risk (Score 3-4) - Pharmacological DVT Prophylaxis Ordered: heparin. Code Status: Level 3 - DNAR and DNI   Discussion with family: Updated  (son) at bedside. Anticipated Length of Stay: Patient will be admitted on an inpatient basis with an anticipated length of stay of greater than 2 midnights secondary to Pericardial effusion.     Total Time Spent on Date of Encounter in care of patient: 55 minutes This time was spent on one or more of the following: performing physical exam; counseling and coordination of care; obtaining or reviewing history; documenting in the medical record; reviewing/ordering tests, medications or procedures; communicating with other healthcare professionals and discussing with patient's family/caregivers. Chief Complaint: Pericardial effusion    History of Present Illness:  Ana Tomas is a 78 y.o. male with a PMH of A-fib, diabetes, heart failure, among others who presents with pericardial effusion. He arrives in transfer from [facility].  He was admitted initially on 6/24 for diarrhea with concern for sepsis. He was believed to have pneumonia with parapneumonic effusion, and KARINA associated with it. He initially required ICU admission for septic shock, but was successfully weaned off of pressors with improvement in his dynamic status. A chest tube was placed for his effusion, and he appears to be tolerating it. An echocardiogram obtained during his hospitalization showed a large pericardial effusion. This was known, but appears worsening since previous evaluation. There does not appear to be signs of tamponade, but cardiology is recommending an evaluation prior to uptitrating heart failure meds. Patient was therefore accepted for transfer to [facility] is resting comfortably. He denies any pain, dyspnea, fever, chills or any other significant concerns    Review of Systems:  Review of Systems   Constitutional: Negative for chills and fever. Respiratory: Negative for chest tightness and shortness of breath. Cardiovascular: Negative for chest pain. Gastrointestinal: Negative for abdominal pain, diarrhea, nausea and vomiting. Musculoskeletal: Negative for myalgias. Neurological: Negative for dizziness, syncope and light-headedness. All other systems reviewed and are negative.       Past Medical and Surgical History:   Past Medical History:   Diagnosis Date   • Atrial fibrillation ([address])    • Chronic kidney disease    • Coronary artery disease    • Diabetes mellitus ([address])    • Hyperlipidemia    • Hypertension        Past Surgical History:   Procedure Laterality Date   • EYE SURGERY     • IR CHEST TUBE PLACEMENT  6/24/2023   • SKIN CANCER EXCISION     • TONSILLECTOMY         Meds/Allergies:  Prior to Admission medications    Medication Sig Start Date End Date Taking?  Authorizing Provider   allopurinol (ZYLOPRIM) 100 mg tablet Take 100 mg by mouth 2 (two) times a day    Historical Provider, MD   ALPRAZolam Julian Jey) 0.5 mg tablet 0.5 mg daily at bedtime  3/12/18   Historical Provider, MD   ascorbic acid (VITAMIN C) 500 MG tablet Take 1 tablet (500 mg total) by mouth daily 1/8/21   Nolvia Vazquez MD   atorvastatin (LIPITOR) 40 mg tablet Take 1 tablet (40 mg total) by mouth daily with dinner 1/16/19   Geno Moreno,    Blood Pressure Monitor NILTON by Does not apply route daily 2/19/20   Jil Almendarez MD   carvedilol (COREG) 6.25 mg tablet Take 1 tablet (6.25 mg total) by mouth 2 (two) times a day with meals 1/16/19   Phoebe Perez DO   cholecalciferol (VITAMIN D3) 1,000 units tablet Take 1 tablet (1,000 Units total) by mouth daily  Patient taking differently: Take 2,000 Units by mouth daily 10/18/19   Jil Almendarez MD   Eliquis 2.5 MG TAKE ONE TABLET BY MOUTH TWICE A DAY 7/15/22   Erickson Ramos DO   glimepiride (AMARYL) 2 mg tablet Take 1 tablet (2 mg total) by mouth daily with dinner 2/27/23   Nolvia Vazquez MD   glimepiride (AMARYL) 4 mg tablet Take 1 tablet (4 mg total) by mouth daily with breakfast 2/27/23   Nolvia Vazquez MD   isosorbide mononitrate (IMDUR) 30 mg 24 hr tablet Take 1 tablet (30 mg total) by mouth daily 1/17/19   Phoebe Perez DO   latanoprost (XALATAN) 0.005 % ophthalmic solution 1 drop daily at bedtime    Historical Provider, MD   sitaGLIPtin (JANUVIA) 25 mg tablet Take 1 tablet (25 mg total) by mouth daily Do not start before February 28, 2023. 2/28/23   Raven Hua MD   tamsulosin [facility]) 0.4 mg Take 0.4 mg by mouth daily with dinner    Historical Provider, MD   timolol (TIMOPTIC) 0.5 % ophthalmic solution Administer 1 drop to both eyes daily 1/7/21   Raven Hua MD   Torsemide 40 MG TABS Take 40 mg by mouth in the morning 3/23/23   Erickson Ramos DO   ZINC OXIDE PO Take by mouth daily    Historical Provider, MD     I have reviewed home medications using recent Epic encounter. Allergies: Allergies   Allergen Reactions   • Elemental Sulfur    • Sulfa Antibiotics        Social History:  Marital Status:    Occupation: Retired  Patient Pre-hospital Living Situation: Home  Patient Pre-hospital Level of Mobility: walks  Patient Pre-hospital Diet Restrictions: Diabetic  Substance Use History:   Social History     Substance and Sexual Activity   Alcohol Use Not Currently   • Alcohol/week: 0.0 standard drinks of alcohol    Comment: special occasions     Social History     Tobacco Use   Smoking Status Former   Smokeless Tobacco Former     Social History     Substance and Sexual Activity   Drug Use Not Currently       Family History:  Family History   Problem Relation Age of Onset   • Heart disease Mother    • Diabetes Father    • Vision loss Father    • Cancer Sister    • Diabetes Sister        Physical Exam:     Vitals:        Physical Exam  Vitals and nursing note reviewed. Constitutional:       General: He is not in acute distress. Appearance: He is well-developed. He is not toxic-appearing or diaphoretic. Comments: Frail, elderly   HENT:      Head: Normocephalic and atraumatic. Mouth/Throat:      Mouth: Mucous membranes are moist.   Eyes:      General: No scleral icterus. Extraocular Movements: Extraocular movements intact. Conjunctiva/sclera: Conjunctivae normal.   Cardiovascular:      Rate and Rhythm: Normal rate and regular rhythm. Pulses: Normal pulses. Heart sounds:  No murmur heard. No friction rub. No gallop. Pulmonary:      Effort: Pulmonary effort is normal. No respiratory distress. Breath sounds: Normal breath sounds. No wheezing, rhonchi or rales. Comments: Right sided chest tube present  Abdominal:      General: Abdomen is flat. Bowel sounds are normal. There is no distension. Palpations: Abdomen is soft. There is no mass. Tenderness: There is no abdominal tenderness. There is no guarding. Genitourinary:     Comments: Dupont draining clear urine  Musculoskeletal:         General: No swelling. Cervical back: Neck supple. Right lower leg: Edema present. Left lower leg: Edema present. Comments: 1+ BL LE pitting edema   Lymphadenopathy:      Cervical: No cervical adenopathy. Skin:     General: Skin is warm and dry. Capillary Refill: Capillary refill takes less than 2 seconds. Coloration: Skin is not jaundiced. Findings: No rash. Comments: Stasis dermatitis   Neurological:      General: No focal deficit present. Mental Status: He is alert and oriented to person, place, and time. Sensory: No sensory deficit. Motor: No weakness. Psychiatric:         Mood and Affect: Mood normal.          Additional Data:     Lab Results:  Results from last 7 days   Lab Units 06/28/23  1155 06/24/23  0615 06/23/23  2153   WBC Thousand/uL 6.50   < > 3.64*   HEMOGLOBIN g/dL 11.0*   < > 10.7*   HEMATOCRIT % 32.9*   < > 32.5*   PLATELETS Thousands/uL 49*   < > 66*   BANDS PCT %  --   --  13*   NEUTROS PCT % 73   < >  --    LYMPHS PCT % 13*   < >  --    LYMPHO PCT %  --   --  24   MONOS PCT % 12   < >  --    MONO PCT %  --   --  11   EOS PCT % 1   < > 1    < > = values in this interval not displayed.      Results from last 7 days   Lab Units 06/28/23  1155   SODIUM mmol/L 132*   POTASSIUM mmol/L 5.2   CHLORIDE mmol/L 102   CO2 mmol/L 25   BUN mg/dL 65*   CREATININE mg/dL 2.75*   ANION GAP mmol/L 5   CALCIUM mg/dL 7.7* ALBUMIN g/dL 2.7*   TOTAL BILIRUBIN mg/dL 0.84   ALK PHOS U/L 124*   ALT U/L 54*   AST U/L 38   GLUCOSE RANDOM mg/dL 246*     Results from last 7 days   Lab Units 06/26/23  0600   INR  1.48*     Results from last 7 days   Lab Units 06/28/23  2205 06/28/23  1618 06/28/23  1112 06/28/23  0711 06/27/23  2055 06/27/23  1820 06/27/23  1648 06/27/23  1357 06/27/23  1140 06/27/23  0945 06/27/23  0709 06/27/23  0208   POC GLUCOSE mg/dl 183* 209* 267* 196* 206* 155* 214* 322* 235* 199* 86 99         Results from last 7 days   Lab Units 06/23/23  2153   LACTIC ACID mmol/L 1.7       Lines/Drains:  Invasive Devices     Peripheral Intravenous Line  Duration           Long-Dwell Peripheral IV (Midline) 62/26/24 Right Basilic 4 days          Drain  Duration           Chest Tube Right 10.2 Fr. 4 days    Urethral Catheter 16 Fr. <1 day              Urinary Catheter:  Goal for removal: Voiding trial when ambulation improves             Imaging: Reviewed radiology reports from this admission including: chest CT scan  No orders to display       EKG and Other Studies Reviewed on Admission:   · EKG: Atrial fibrillation. HR 56.    ** Please Note: This note has been constructed using a voice recognition system.  **

## 2023-06-29 NOTE — DISCHARGE SUMMARY
Discharge Summary - [facility]  Internal Medicine    Patient Information: Daphne Gil 78 y o  male MRN: [id number]  Unit/Bed#: [address] [place] [address] Encounter: [id number]    Discharging Physician / Practitioner: Leon Bliss MD  PCP: Mayra Marie DO  Admission Date: 6/23/2023  Discharge Date: 06/29/23    Reason for Admission: Weakness - Generalized (Patient has a visiting nurse who told family to bring him in  Patient has had diarrhea since last Thursday, progressive weakness, and the nurse is worried about dehydration)      Discharge Diagnoses:     Primary Discharge Diagnosis:   Large Pericardial Effusion   Principal Problem:    Septic shock ([facility] )  Active Problems:    Pericardial effusion    Thrombocytopenia (HCC)    Chronic combined systolic and diastolic congestive heart failure (HCC)    Acute kidney injury superimposed on chronic kidney disease ([facility] )    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 ([facility] )    Essential hypertension    Atrial fibrillation with slow ventricular response (HCC)    Hyponatremia    Diarrhea    Hypothermia    Right lower lobe consolidation ([facility])    Hypoglycemia    Empyema of lung ([facility] )  Resolved Problems:    * No resolved hospital problems  *      Consultations During Hospital Stay:  IP CONSULT TO CASE MANAGEMENT  INPATIENT CONSULT TO IR  IP CONSULT TO PULMONOLOGY  IP CONSULT TO CARDIOLOGY  IP CONSULT TO HEMATOLOGY    Procedures Performed: CXR  Right Sided Chest tube placement for Loculated pleural effusion  Significant Findings:   · Refer to hospital course and above listed diagnosis related plan for details    Imaging while in hospital:  CT Chest A/P  Renal USG    Incidental Findings:      Test Results Pending at Discharge (will require follow up):   · As per After Visit Summary     Outpatient Tests Requested:  · Tx to [facility]     Complications:  Refer to hospital course and above listed diagnosis related plan, if any    Hospital Course: "Olivia Knight is a 78 y o  male patient with PMHx of CKD Stg IV, CHF, Type II DM, presented to ED at [facility] with c/c of Hypotension and was admitted to ICU due to need for pressors  Pt was found to be in septic shock  But No obvious source noted except for a loculated pleural effusion on the Rt side  S/p Chest tub eplacement and on iv Cefepime  Pt got better and was weaned off of pressors  Tx to floors  But patient had a h/o moderate pericardial effusion which now was large and not causing any hemodynamic compromise  Patient was seen by cardiology and recommended pericardial window  Hence patient is being Transferred to [facility] for further evaluation by CT Sx  Pt had 1/2 Bld Cx +Ve for Micrococcus Luteus and it was suspected to be a contaminant  Pt has thrombocytopenia - unclear etiology possibly from sepsis or from cefepime  Hematology consulted  Please see above list of diagnoses and related plan for additional information  Condition at Discharge: fair     Discharge Day Visit / Exam:     Subjective:  I have seen and examined the patient at bedside  Overnight events reviewed with the RN  Vitals: Blood Pressure: 152/89 (06/28/23 2011)  Pulse: 62 (06/28/23 2011)  Temperature: (!) 97 4 °F (36 3 °C) (06/28/23 2011)  Temp Source: Axillary (06/28/23 2011)  Respirations: 16 (06/28/23 2011)  Height: 5' 9\" (175 3 cm) (06/24/23 0755)  Weight - Scale: 74 5 kg (164 lb 3 9 oz) (06/28/23 0600)  SpO2: 97 % (06/28/23 2011)  Exam:   Physical Exam    General : Alert, Awake and oriented x 2,  NAD  Chronically ill looking  Neck : Supple  Eyes:  LORRAINE, EOMI  CVS : S1, S2, irregularly irregular heart rhythm   Distant heart sounds  R/S : Clear to auscultate anteriorly  Equal breath sound the right side up to mid lung zones  No wheezes appreciated no rhonchi appreciated  Abd: Soft, NT, ND  Bs+ve  Extremity: pedal edema noted  Skin: No acute Rash noted  But some bruising noted b/l legs     CNS: No acute " "FND  Confused  Discharge instructions/Information to patient and family:(Discharge Medications and Follow up):   See after visit summary for information provided to patient and family  Provisions for Follow-Up Care:  See after visit summary for information related to follow-up care and any pertinent home health orders  Disposition: JUMANAB    Planned Readmission:  Yes     Discharge Statement:  I spent 35 minutes discharging the patient  This time was spent on the day of discharge  I had direct contact with the patient on the day of discharge  Greater than 50% of the total time was spent examining patient, answering all patient questions, arranging and discussing plan of care with patient as well as directly providing post-discharge instructions  Additional time then spent on discharge activities  Discharge Medications:  See after visit summary for reconciled discharge medications provided to patient and family  ** Please Note: \"This note has been constructed using a voice recognition system  Therefore there may be syntax, spelling, and/or grammatical errors  Please call if you have any questions   \"**            "

## 2023-06-29 NOTE — CONSULTS
Consultation - General Cardiology Team 2  Abraham Portillo 78 y.o. male MRN: [id number]  Unit/Bed#: [facility] 426-01 Encounter: [id number]      Inpatient consult to Cardiology  Consult performed by: Sierra Mcnamara  Consult ordered by: Arleen De La Paz DO        PCP: Dulce Maria Osman DO   Outpatient Cardiologist: Dr. Sagar Proctor    History of Present Illness   Physician Requesting Consult: Arleen De La Paz DO  Reason for Consult / Principal Problem: pericardial effusion    HPI: Abraham Portillo is a 79 y/o M w pmhx of A-fib on eliquis 2.5, T2DM a1c 7.1 4mo 5/11 not on insulin, CKD IV, HTN who prsented w 4 days of diarrhea. Visiting nurse recommended ER eval. In [place] ER, met sepsis criteria. Received fluids, cefepime, pressors. Cr 3.7 (baseline 2.5-3), WBC 3, PLT 66, Na 127, Lobar consolidation on CXR, admitted to ICU. Weaned off pressors, chest tube placed for effusion. Echo revealed pericardial effusion, no signs of tamponade. Transferred to [facility] 6/28 for evaluation of effusion. Patient has been hemodynamically stable since transfer to [facility]. Cardiology was consulted for evaluation of the pericardial effusion. On interview today, the patient endorses mild orthopnea which is normal for him, but otherwise denies CP, SOB, palpitations, syncope, light headedness. Patient appears comfortable laying in bed and is not in acute distress. On further review of the patient's medical record, the pericardial effusion has been present since at least 1/11/2019 on routine echo for heart failure. Review of Systems  Review of system was conducted and was negative except for as stated in the HPI.       Historical Information   Past Medical History:   Diagnosis Date   • Atrial fibrillation ([address])    • Chronic kidney disease    • Coronary artery disease    • Diabetes mellitus ([address])    • Hyperlipidemia    • Hypertension      Past Surgical History:   Procedure Laterality Date   • EYE SURGERY     • IR CHEST TUBE PLACEMENT  6/24/2023   • SKIN CANCER EXCISION     • TONSILLECTOMY       Social History     Substance and Sexual Activity   Alcohol Use Not Currently   • Alcohol/week: 0.0 standard drinks of alcohol    Comment: special occasions     Social History     Substance and Sexual Activity   Drug Use Not Currently     Social History     Tobacco Use   Smoking Status Former   Smokeless Tobacco Former     Family History: unknown    Meds/Allergies   Hospital Medications:   Current Facility-Administered Medications   Medication Dose Route Frequency   • ascorbic acid (VITAMIN C) tablet 500 mg  500 mg Oral Daily   • atorvastatin (LIPITOR) tablet 40 mg  40 mg Oral Daily With Dinner   • cefepime (MAXIPIME) 1,000 mg in dextrose 5 % 50 mL IVPB  1,000 mg Intravenous Q12H   • cholecalciferol (VITAMIN D3) tablet 2,000 Units  2,000 Units Oral Daily   • docusate sodium (COLACE) capsule 100 mg  100 mg Oral BID   • heparin (porcine) subcutaneous injection 5,000 Units  5,000 Units Subcutaneous Q8H [address]   • insulin lispro (HumaLOG) 100 units/mL subcutaneous injection 1-5 Units  1-5 Units Subcutaneous TID AC   • insulin lispro (HumaLOG) 100 units/mL subcutaneous injection 1-5 Units  1-5 Units Subcutaneous HS   • latanoprost (XALATAN) 0.005 % ophthalmic solution 1 drop  1 drop Both Eyes HS   • polyethylene glycol (MIRALAX) packet 17 g  17 g Oral Daily   • senna (SENOKOT) tablet 17.2 mg  2 tablet Oral HS   • tamsulosin (FLOMAX) capsule 0.4 mg  0.4 mg Oral Daily With Dinner   • timolol (TIMOPTIC) 0.5 % ophthalmic solution 1 drop  1 drop Both Eyes Daily   • torsemide (DEMADEX) tablet 40 mg  40 mg Oral Daily     Home Medications:   Medications Prior to Admission   Medication   • allopurinol (ZYLOPRIM) 100 mg tablet   • ALPRAZolam (XANAX) 0.5 mg tablet   • ascorbic acid (VITAMIN C) 500 MG tablet   • atorvastatin (LIPITOR) 40 mg tablet   • Blood Pressure Monitor NILTON   • carvedilol (COREG) 6.25 mg tablet   • cholecalciferol (VITAMIN D3) 1,000 units tablet   • Eliquis 2.5 MG   • glimepiride (AMARYL) 2 mg tablet   • glimepiride (AMARYL) 4 mg tablet   • isosorbide mononitrate (IMDUR) 30 mg 24 hr tablet   • latanoprost (XALATAN) 0.005 % ophthalmic solution   • sitaGLIPtin (JANUVIA) 25 mg tablet   • tamsulosin (FLOMAX) 0.4 mg   • timolol (TIMOPTIC) 0.5 % ophthalmic solution   • Torsemide 40 MG TABS   • ZINC OXIDE PO       Allergies   Allergen Reactions   • Elemental Sulfur    • Sulfa Antibiotics        Objective   Vitals: Blood pressure 142/81, pulse 80, temperature 97.6 °F (36.4 °C), temperature source Oral, resp. rate 16, weight 74.9 kg (165 lb 3.2 oz), SpO2 97 %. Orthostatic Blood Pressures    Flowsheet Row Most Recent Value   Blood Pressure 142/81 filed at 06/29/2023 0706   Patient Position - Orthostatic VS Lying filed at 06/29/2023 0250            Invasive Devices     Peripheral Intravenous Line  Duration           Long-Dwell Peripheral IV (Midline) 62/79/50 Right Basilic 4 days          Drain  Duration           Chest Tube Right 10.2 Fr. 4 days    Urethral Catheter 16 Fr. 1 day                Physical Exam    GEN: Wolcott Plume appears well, alert and oriented x 3, pleasant and cooperative   HEENT:  Normocephalic, atraumatic, anicteric, moist mucous membranes  NECK: No JVD, accessory muscle use during inspiration  HEART: irregular rhythm, regular rate, normal S1 and S2, no murmurs, clicks, gallops or rubs   LUNGS: Diminished breath sounds bilaterally in lung bases. Mild crackles. Increased effort of breathing   ABDOMEN:  Normoactive bowel sounds, soft, no tenderness, no distention  EXTREMITIES: peripheral pulses palpable; no edema  NEURO: no gross focal findings; cranial nerves grossly intact   SKIN:  Dry, intact, warm to touch    Lab Results: I have personally reviewed pertinent lab results.     Results from last 7 days   Lab Units 06/23/23  2357 06/23/23  2153   HS TNI 0HR ng/L  --  6   HS TNI 2HR ng/L 6  --          Results from last 7 days   Lab Units 06/29/23  0209 06/28/23  1155 23  0539   POTASSIUM mmol/L 4.8 5.2 4.5   CO2 mmol/L 28 25 23   CHLORIDE mmol/L 104 102 105   BUN mg/dL 71* 65* 76*   CREATININE mg/dL 2.75* 2.75* 3.05*     Results from last 7 days   Lab Units 23  0209 23  1155 23  0539   HEMOGLOBIN g/dL 11.4* 11.0* 9.7*   HEMATOCRIT % 34.6* 32.9* 29.4*   PLATELETS Thousands/uL 55* 49* 48*             Imaging: I have personally reviewed pertinent reports. Telemetry:   A-fib, rate controlled    EKG:   Date:   Interpretation:   Atrial fibrillation  Low voltage QRS  Nonspecific ST and T wave abnormality  Abnormal ECG  When compared with ECG of 2023 21:55,  No significant change was found     Results for orders placed during the hospital encounter of 01/10/19    NM Myocardial Perfusion Spect (Exercise Induced Stress and/or Rest)    [address]  [phone]    Rest/Stress Gated SPECT Myocardial Perfusion Imaging After Regadenoson    Patient: Singh Mcdonough  MR number: [id number]  Account number: [de-identified]  : 1940  Age: 78 years  Gender: Male  Status: Inpatient  Location: Stress lab  Height: 69 in  Weight: 166 lb  BP: 187/ 80 mmHg    Allergies: SULFA ANTIBIOTICS, SULFUR    Diagnosis: R06.02 - Shortness of breath    Primary Physician:  Joslyn Blanchard DO  Technician:  Maria Del Carmen Deshpande  RN:  VLADIMIR Hdez  Group:  Albert Roberts  Report Prepared By[de-identified]  VLADIMIR Hdez  Interpreting Physician:  Lisa Aviles MD    INDICATIONS: Evaluation for coronary artery disease. HISTORY: The patient is a 78year old  male. Chest pain status: no chest pain. Other symptoms: dyspnea and edema. Coronary artery disease risk factors: hypertension, family history of premature coronary artery disease, and  diabetes mellitus. Cardiovascular history: pericardial effusion. PHYSICAL EXAM: Baseline physical exam screening: no wheezes audible.     REST ECG: Normal sinus rhythm. The ECG showed occasional premature ventricular contractions. PROCEDURE: The study was performed in the the Stress lab. A regadenoson infusion pharmacologic stress test was performed. Gated SPECT myocardial perfusion imaging was performed after stress and at rest. Systolic blood pressure was 187  mmHg, at the start of the study. Diastolic blood pressure was 80 mmHg, at the start of the study. The heart rate was 73 bpm, at the start of the study. IV double checked. Regadenoson protocol:  Time HR bpm SBP mmHg DBP mmHg Symptoms ST change Rhythm/conduct  Baseline 10:40 72 187 80 none none NSR, frequent PVC's  Immediate 10:48 77 188 72 dizziness -- same as above  1 min 10:49 81 172 72 same as above -- ventricular bigeminy  2 min 10:50 76 151 68 subsiding -- --  3 min 10:51 77 130 70 none -- ventricular bigeminy  4 min 10:52 75 140 70 none -- --  No medications or fluids given. STRESS SUMMARY: Duration of pharmacologic stress was 3 min. Maximal heart rate during stress was 87 bpm. The heart rate response to stress was normal. There was resting hypertension with an appropriate blood pressure response to stress. The rate-pressure product for the peak heart rate and blood pressure was 07592. There was no chest pain during stress. The stress test was terminated due to protocol completion. Pre oxygen saturation: 98 %. Peak oxygen saturation: 98 %. Arrhythmia during stress: isolated premature ventricular beats. ISOTOPE ADMINISTRATION:  Resting isotope administration Stress isotope administration  Agent Tetrofosmin Tetrofosmin  Dose 10.8 mCi 32.6 mCi  Date 01/14/2019 01/14/2019  Injection time 08:30 10:50    The radiopharmaceutical was injected at the peak effect of pharmacologic stress. MYOCARDIAL PERFUSION IMAGING:  The image quality was fair. The left ventricle was mildly dilated. The TID ratio was . 9.     PERFUSION DEFECTS:  -  There was a moderate-sized, moderately severe, fixed myocardial perfusion defect of the entire inferolateral wall. GATED SPECT:  The calculated left ventricular ejection fraction was 37 %. Left ventricular ejection fraction was moderately decreased by visual estimate. There was moderately reduced myocardial thickening and motion of the lateral wall of the left  ventricle. SUMMARY:  -  Stress results: There was resting hypertension with an appropriate blood pressure response to stress. There was no chest pain during stress. -  Perfusion imaging: There was a moderate-sized, moderately severe, fixed myocardial perfusion defect of the entire inferolateral wall. -  Gated SPECT: The calculated left ventricular ejection fraction was 37 %. Left ventricular ejection fraction was moderately decreased by visual estimate. There was moderately reduced myocardial thickening and motion of the lateral wall  of the left ventricle. -  Impressions and recommendations: Abnormal study after pharmacologic vasodilation with fixed inferolateral wall defect, no clear ischemia. EF 37%. Can not rule out old inferolateral wall infarction. IMPRESSIONS: Abnormal study after pharmacologic vasodilation with fixed inferolateral wall defect, no clear ischemia. EF 37%. Can not rule out old inferolateral wall infarction. There was a moderate-sized infarct. Left ventricular systolic  function was reduced, with regional wall motion abnormalities. Prepared and signed by    Grisel Knox MD  Signed 01/14/2019 17:13:54    ECHO:  Results for orders placed during the hospital encounter of 04/01/21    Echo complete with contrast if indicated    SUMMARY    LEFT VENTRICLE:  Systolic function was mildly to moderately reduced by visual assessment. Ejection fraction was estimated in the range of 40 % to 45 %. There was mild diffuse hypokinesis. There was moderate concentric hypertrophy.   Features were consistent with a pseudonormal left ventricular filling pattern, with concomitant abnormal relaxation and increased filling pressure (grade 2 diastolic dysfunction). LEFT ATRIUM:  The atrium was mildly dilated. MITRAL VALVE:  There was mild regurgitation. AORTIC VALVE:  There was no evidence for stenosis. There was no regurgitation. TRICUSPID VALVE:  There was moderate regurgitation. Pulmonary artery systolic pressure was moderately increased. PERICARDIUM:  A moderate to large, free-flowing pericardial effusion was identified circumferential to the heart. The fluid had no internal echoes. There was no evidence of hemodynamic compromise. COMPARISONS:  There has been no significant interval change. Pericardial effusion is overall unchanged. There is no evidence of tamponade present. Comparison was made with the previous study of 11-Jan-2019. HISTORY: PRIOR HISTORY: AFIB,CKD,CAD,Diabetes,HLD,HTN,PVC. Pericardial effusion    PROCEDURE: The procedure was performed in the echo lab. This was a routine study. The transthoracic approach was used. The study included complete 2D imaging, M-mode, complete spectral Doppler, and color Doppler. The heart rate was 68 bpm,  at the start of the study. Images were obtained from the parasternal, apical, subcostal, and suprasternal notch acoustic windows. Image quality was adequate. LEFT VENTRICLE: Size was normal. Systolic function was mildly to moderately reduced by visual assessment. Ejection fraction was estimated in the range of 40 % to 45 %. There was mild diffuse hypokinesis. There was moderate concentric  hypertrophy. DOPPLER: Features were consistent with a pseudonormal left ventricular filling pattern, with concomitant abnormal relaxation and increased filling pressure (grade 2 diastolic dysfunction). RIGHT VENTRICLE: The size was normal. Systolic function was normal. DOPPLER: Systolic pressure was within the normal range. LEFT ATRIUM: The atrium was mildly dilated.     RIGHT ATRIUM: Size was normal.    MITRAL VALVE: Valve structure was normal. There was normal leaflet separation. No echocardiographic evidence for prolapse. DOPPLER: The transmitral velocity was within the normal range. There was no evidence for stenosis. There was mild  regurgitation. AORTIC VALVE: The valve was trileaflet. Leaflets exhibited normal thickness and normal cuspal separation. DOPPLER: Transaortic velocity was within the normal range. There was no evidence for stenosis. There was no regurgitation. TRICUSPID VALVE: The valve structure was normal. There was normal leaflet separation. DOPPLER: The transtricuspid velocity was within the normal range. There was moderate regurgitation. Pulmonary artery systolic pressure was moderately  increased. Estimated peak PA pressure was 55 mmHg. PULMONIC VALVE: Leaflets exhibited normal thickness, no calcification, and normal cuspal separation. DOPPLER: The transpulmonic velocity was within the normal range. There was mild regurgitation. PERICARDIUM: There was no thickening. A moderate to large, free-flowing pericardial effusion was identified circumferential to the heart. The fluid had no internal echoes. There was no evidence of hemodynamic compromise. AORTA: The root exhibited normal size. PULMONARY ARTERY: The size was normal. The morphology appeared normal.      1/11/2019  LEFT VENTRICLE:  Systolic function was mildly to moderately reduced. Ejection fraction was estimated in the range of 40 % to 45 % to be 40 %. There was moderate diffuse hypokinesis. Wall thickness was moderately increased.   Features were consistent with a pseudonormal left ventricular filling pattern, with concomitant abnormal relaxation and increased filling pressure (grade 2 diastolic dysfunction).     RIGHT VENTRICLE:  Systolic function was mildly reduced by TAPSE-1.4cm     LEFT ATRIUM:  The atrium was mildly dilated.     MITRAL VALVE:  There was trace regurgitation.     IVC, HEPATIC VEINS:  The inferior vena cava was mildly dilated.     PERICARDIUM:  A moderate, free-flowing pericardial effusion was identified. There was no evidence of hemodynamic compromise. 6/24/2023  •  Left Ventricle: Left ventricular cavity size is normal. Wall thickness is mildly increased. There is mild concentric hypertrophy. The left ventricular ejection fraction is 45% by visual estimation. Systolic function is mildly reduced. There is mild global hypokinesis. •  IVS: There is excessive respiratory change in interventricular septal motion. Consider cardiac tamponade, mechanical ventilation or other causes. •  Right Ventricle: Wall thickness is increased. •  Left Atrium: The atrium is severely dilated. •  Right Atrium: The atrium is mildly dilated. •  Aortic Valve: There is mild regurgitation. •  Mitral Valve: There is mild regurgitation. •  Tricuspid Valve: There is mild regurgitation. PA pressure around 40 to 45 mmHg. •  IVC/SVC: The inferior vena cava is normal in size. Respirophasic changes were blunted (less than 50% variation). •  Pericardium: There is a large pericardial effusion circumferential to the heart. The fluid exhibits no internal echoes. In some views circumferential diameter is around 3 cm. As compared to previous study of 2019 -2021 effusion has increased. No dense of diastolic collapse of RV and right atrial collapse noted consistent with tamponade. •  As compared to previous studies pericardial fusion have now large. No evidence of diastolic collapse of RV noted.         HOLTER  Results for orders placed during the hospital encounter of 02/14/19    Holter monitor - 24 hour    Narrative  Singh Mcdonough  1940    24 HOUR HOLTER MONITOR    INDICATION: Atrial Fibrillation    Average heart rate: 81 bpm  Lowest heart rate: 43 bpm  Highest heart rate: 120 bpm    VENTRICULAR ECTOPY - 1.5% of all monitored beats  Total number: 1682  Runs: 0  Ventricular Couplets: 25  Ventricular Triplets: 0  Bigeminy: 28  Trigeminy: 3    ATRIAL FIBRILLATION - 98.4% of all monitored beats  Slowest ventricular response: 57 bpm  Fastest ventricular response: 124 bpm  Pauses: Longest R-R interval was 2.3 seconds    SYMPTOMS  The patient experienced no significant symptoms during the monitoring time period. She had episodes of fatigue    Impression  1) Abnormal Holter monitor of 24 hrs duration. 2) Normal burden of ventricular ectopic beats. 3) Atrial fibrillation for the duration of the study period. 4) Adequate rate control with no significant pauses. 5) No significant symptoms during monitoring period. Erickson Ramos, DO, FACC    No results found for this or any previous visit. VTE Prophylaxis: Heparin subcutaneous      Assessment/Plan     Assessment: 1. Pericardial effusion  -Appears to be first noted in 2019, though has increased in size over time. Etiology is unclear at this point and has not been worked up previously  -Patient is largely asymptomatic and hemodynamically stable. Systolic blood pressures >482.   -Echocardiography this hospitalization reveals no diastolic collapse of R sided heart chambers, though on our review of the study, there does appear to be R sided heart strain. The patient is hemodynamically stable without signs of tamponade physiology at this time. As the size of the effusion has steadily been increasing, intervention appears warranted. Considering low platelet count, Cr of 2.75, Alb of 2.3, history of Eliquis use, and the large size of effusion, pericardiocentesis would be preferred over pericardial window at this point. Plan:  1. Pericardial effusion  -IR pericardiocentesis tomorrow  -Avoid diuretics, beta blockers  -Hold eliquis   -Monitor on tele  -Monitor clinically    Thank you for involving us in the care of your patient.       [address] [facility]  MS4  [facility]'s    ==========================================================================================    ** Please Note: Fluency DirectDictation voice to text software may have been used in the creation of this document.  **

## 2023-06-29 NOTE — ASSESSMENT & PLAN NOTE
Lab Results   Component Value Date    HGBA1C 8.3 (H) 02/23/2023       Recent Labs     06/28/23  0711 06/28/23  1112 06/28/23  1618 06/28/23  2205   POCGLU 196* 267* 209* 183*       Blood Sugar Average: Last 72 hrs:  · Holding dose of Lantus 10 tonight as patient will be n.p.o. for possible pericardial window.   Continue ISS

## 2023-06-29 NOTE — RESPIRATORY THERAPY NOTE
RT Protocol Note  Lyanne Lefort 78 y.o. male MRN: [id number]  Unit/Bed#: [facility] 426-01 Encounter: [id number]    Assessment    Active Problems:    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 (HCC)    Pericardial effusion    Chronic combined systolic and diastolic congestive heart failure (HCC)    Atrial fibrillation with slow ventricular response (HCC)    Acute kidney injury superimposed on chronic kidney disease (HCC)    Anemia    Urinary retention    Septic shock (HCC)    Thrombocytopenia (HCC)    Empyema of lung (HCC)      Home Pulmonary Medications:  none       Past Medical History:   Diagnosis Date    Atrial fibrillation ([address] )     Chronic kidney disease     Coronary artery disease     Diabetes mellitus ([address] )     Hyperlipidemia     Hypertension      Social History     Socioeconomic History    Marital status:      Spouse name: Not on file    Number of children: 1    Years of education: 15    Highest education level: 12th grade   Occupational History    Not on file   Tobacco Use    Smoking status: Former    Smokeless tobacco: Former   Vaping Use    Vaping Use: Never used   Substance and Sexual Activity    Alcohol use: Not Currently     Alcohol/week: 0.0 standard drinks of alcohol     Comment: special occasions    Drug use: Not Currently    Sexual activity: Not on file   Other Topics Concern    Not on file   Social History Narrative    Not on file     Social Determinants of Health     Financial Resource Strain: Not on file   Food Insecurity: No Food Insecurity (6/26/2023)    Hunger Vital Sign     Worried About Running Out of Food in the Last Year: Never true     Ran Out of Food in the Last Year: Never true   Transportation Needs: No Transportation Needs (6/26/2023)    PRAPARE - Transportation     Lack of Transportation (Medical): No     Lack of Transportation (Non-Medical):  No   Physical Activity: Not on file   Stress: Not on file   Social Connections: Not on file   Intimate Partner Violence: Not on file   Housing Stability: Low Risk  (6/26/2023)    Housing Stability Vital Sign     Unable to Pay for Housing in the Last Year: No     Number of Places Lived in the Last Year: 1     Unstable Housing in the Last Year: No       Subjective         Objective    Physical Exam:   Assessment Type: Assess only  General Appearance: Alert, Awake  Respiratory Pattern: Normal  Chest Assessment: Chest expansion symmetrical  Bilateral Breath Sounds: Diminished    Vitals:  Blood pressure 104/64, temperature 98.1 °F (36.7 °C), resp. rate 14. Imaging and other studies:    06/28/23 2250   Respiratory Protocol   Protocol Initiated? Yes   Protocol Selection Respiratory; Airway Clearance   Language Barrier? No   Medical & Social History Reviewed? Yes   Diagnostic Studies Reviewed? Yes   Physical Assessment Performed? Yes   Airway Clearance Plan Incentive Spirometer   Respiratory Plan Discontinue Protocol   Respiratory Assessment   Assessment Type Assess only   General Appearance Alert; Awake   Respiratory Pattern Normal   Chest Assessment Chest expansion symmetrical   Bilateral Breath Sounds Diminished   Resp Comments Assessment of Respiratory and Airway protocol. Pt was transfered from [facility]. Had large pericardial effusion. Has chest tubes. Xrays show RLL consolidation, empyema of lung. Has septic shock. Hx of CHF. Does not take any resp tx at home. BS clear. 97% R/A. Having a pericarial window done. DC Respiratory protocol. Start IS               Plan    Respiratory Plan: Discontinue Protocol  Airway Clearance Plan: Incentive Spirometer     Resp Comments: Assessment of Respiratory and Airway protocol. Pt was transfered from [facility]. Had large pericardial effusion. Has chest tubes. Xrays show RLL consolidation, empyema of lung. Has septic shock. Hx of CHF. Does not take any resp tx at home. BS clear. 97% R/A. Having a pericarial window done. DC Respiratory protocol.  Start IS

## 2023-06-29 NOTE — ASSESSMENT & PLAN NOTE
· Present on admission, patient appears to have baseline thrombocytopenia  · Low suspicion for HIT, 4T score would be 0-1 by my calculation  · Likely secondary to sepsis, critical illness  · Peripheral smear did not note any schistocytes or other concerning findings of TMA/TTP  · Holding full dose anticoagulation proceeding with subcu heparin, repeat CBC in the morning.   CT to evaluate for pericardial window

## 2023-06-29 NOTE — ASSESSMENT & PLAN NOTE
Lab Results   Component Value Date    EGFR 20 06/28/2023    EGFR 17 06/27/2023    EGFR 15 06/26/2023    CREATININE 2.75 (H) 06/28/2023    CREATININE 3.05 (H) 06/27/2023    CREATININE 3.38 (H) 06/26/2023   · Resolving, likely secondary to overdiuresis and septic shock  · Repeat BMP in the morning

## 2023-06-29 NOTE — CONSULTS
Consult: Thoracic Surgery  Ankit Mohamud 78 y.o. male MRN: [id number]  Unit/Bed#: [facility] 426-01 Encounter: [id number]        Assessment/Plan     Assessment:  Patient is a 78 y.o. male with pmhx of multiple comorbidities who presented with pneumonia and parapneumonic effusion s/p IR CT on  found to have pericardial effusion concerning for tamponade for which thoracic surgery is consulted to evaluate for pericardial window. Afebrile, VSS  WBC: 6.5; Hb; Cr: 2.75 (baseline creatinine 2.5-3 mg/dL)      Plan:  Recommend medical management for fluid overload and acute infection. Keep CT to sxn for now, will discuss possible TPA-dornase with attending. Continue abx  Recommend cards eval for pericardiocentesis    History of Present Illness     HPI:  Ankit Mohamud is a 78 y.o. 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 He was started on antibiotics and IR was consulted for placement of a R sided CT. Echo revealed a large pericardial effusion. Pt was placed on pressors at the time of admission due to hypotension. Pt denies chest pain, SOB. States he is eating okay at home. He urinates okay at home, but currently has a victor. He denies any other complaints. He denies abdominal surgeries in the past. He doesn't recall last time he took his eliquis. Review of Systems   All other systems reviewed and are negative.     Inpatient consult to Thoracic Surgery  Consult performed by: Sheila Lin MD  Consult ordered by: Jayro Cedillo          Historical Information   Past Medical History:   Diagnosis Date   • Atrial fibrillation [facility])    • Chronic kidney disease    • Coronary artery disease    • Diabetes mellitus ([address])    • Hyperlipidemia    • Hypertension      Past Surgical History: Procedure Laterality Date   • EYE SURGERY     • IR CHEST TUBE PLACEMENT  2023   • SKIN CANCER EXCISION     • TONSILLECTOMY       Social History   Social History     Substance and Sexual Activity   Alcohol Use Not Currently   • Alcohol/week: 0.0 standard drinks of alcohol    Comment: special occasions     Social History     Substance and Sexual Activity   Drug Use Not Currently     Social History     Tobacco Use   Smoking Status Former   Smokeless Tobacco Former     Family History:   Family History   Problem Relation Age of Onset   • Heart disease Mother    • Diabetes Father    • Vision loss Father    • Cancer Sister    • Diabetes Sister        Meds/Allergies   PTA meds:   Prior to Admission Medications   Prescriptions Last Dose Informant Patient Reported? Taking?    ALPRAZolam (XANAX) 0.5 mg tablet  Child Yes No   Si.5 mg daily at bedtime    Blood Pressure Monitor NILTON  Child No No   Sig: by Does not apply route daily   Eliquis 2.5 MG  Child No No   Sig: TAKE ONE TABLET BY MOUTH TWICE A DAY   Torsemide 40 MG TABS  Child No No   Sig: Take 40 mg by mouth in the morning   ZINC OXIDE PO  Child Yes No   Sig: Take by mouth daily   allopurinol (ZYLOPRIM) 100 mg tablet  Child Yes No   Sig: Take 100 mg by mouth 2 (two) times a day   ascorbic acid (VITAMIN C) 500 MG tablet  Child No No   Sig: Take 1 tablet (500 mg total) by mouth daily   atorvastatin (LIPITOR) 40 mg tablet  Child No No   Sig: Take 1 tablet (40 mg total) by mouth daily with dinner   carvedilol (COREG) 6.25 mg tablet  Child No No   Sig: Take 1 tablet (6.25 mg total) by mouth 2 (two) times a day with meals   cholecalciferol (VITAMIN D3) 1,000 units tablet  Child No No   Sig: Take 1 tablet (1,000 Units total) by mouth daily   Patient taking differently: Take 2,000 Units by mouth daily   glimepiride (AMARYL) 2 mg tablet  Child No No   Sig: Take 1 tablet (2 mg total) by mouth daily with dinner   glimepiride (AMARYL) 4 mg tablet  Child No No   Sig: Take 1 tablet (4 mg total) by mouth daily with breakfast   isosorbide mononitrate (IMDUR) 30 mg 24 hr tablet  Child No No   Sig: Take 1 tablet (30 mg total) by mouth daily   latanoprost (XALATAN) 0.005 % ophthalmic solution  Child Yes No   Si drop daily at bedtime   sitaGLIPtin (JANUVIA) 25 mg tablet  Child No No   Sig: Take 1 tablet (25 mg total) by mouth daily Do not start before 2023. tamsulosin (FLOMAX) 0.4 mg  Child Yes No   Sig: Take 0.4 mg by mouth daily with dinner   timolol (TIMOPTIC) 0.5 % ophthalmic solution  Child No No   Sig: Administer 1 drop to both eyes daily      Facility-Administered Medications: None     Allergies   Allergen Reactions   • Elemental Sulfur    • Sulfa Antibiotics        Objective   First Vitals:        Current Vitals:        No intake or output data in the 24 hours ending 23 2241    Invasive Devices     Peripheral Intravenous Line  Duration           Long-Dwell Peripheral IV (Midline)  Right Basilic 4 days          Drain  Duration           Chest Tube Right 10.2 Fr. 4 days    Urethral Catheter 16 Fr. <1 day                Physical Exam  Vitals reviewed. Constitutional:       General: He is not in acute distress. Appearance: He is not ill-appearing, toxic-appearing or diaphoretic. HENT:      Head: Normocephalic and atraumatic. Eyes:      Extraocular Movements: Extraocular movements intact. Cardiovascular:      Rate and Rhythm: Normal rate. Pulmonary:      Effort: Pulmonary effort is normal. No respiratory distress. Comments: On RA  R CT in place    Skin:     General: Skin is warm and dry. Neurological:      Mental Status: He is alert and oriented to person, place, and time. Mental status is at baseline.    Psychiatric:         Mood and Affect: Mood normal.         Behavior: Behavior normal.         Lab Results:   CBC:   Lab Results   Component Value Date    WBC 6.50 2023    HGB 11.0 (L) 2023    HCT 32.9 (L) 2023    MCV 104 (H) 06/28/2023    PLT 49 (LL) 06/28/2023    RBC 3.15 (L) 06/28/2023    MCH 34.9 (H) 06/28/2023    MCHC 33.4 06/28/2023    RDW 15.6 (H) 06/28/2023    MPV 10.7 06/28/2023    NRBC 0 06/28/2023   , CMP:   Lab Results   Component Value Date    SODIUM 132 (L) 06/28/2023    K 5.2 06/28/2023     06/28/2023    CO2 25 06/28/2023    BUN 65 (H) 06/28/2023    CREATININE 2.75 (H) 06/28/2023    CALCIUM 7.7 (L) 06/28/2023    AST 38 06/28/2023    ALT 54 (H) 06/28/2023    ALKPHOS 124 (H) 06/28/2023    EGFR 20 06/28/2023   , Coagulation: No results found for: "PT", "INR", "APTT"  Imaging: I have personally reviewed pertinent reports. EKG, Pathology, and Other Studies: I have personally reviewed pertinent reports.       Code Status: Level 3 - DNAR and DNI  Advance Directive and Living Will:      Power of :    POLST:

## 2023-06-29 NOTE — ASSESSMENT & PLAN NOTE
· Appears to been noted as far back as 2019 and is increasing in size  · Echocardiogram 6/24 noted worsening effusion with no signs of tamponade  · Discussed case with thoracic surgery via [place] text. No clinical concerns for tamponade noted. We will consult cardiology for potential pericardiocentesis. Discussed case with cardiology.     · Fortunately patient is nontoxic

## 2023-06-29 NOTE — ASSESSMENT & PLAN NOTE
· Status post chest tube, patient appears to be tolerating well  · Was on waterseal for transfer but ordered to be back on suction  · Thoracic surgery consulted  · No significant growth on pleural fluid culture

## 2023-06-29 NOTE — ASSESSMENT & PLAN NOTE
Wt Readings from Last 3 Encounters:   06/28/23 74.5 kg (164 lb 3.9 oz)   05/11/23 66.7 kg (147 lb)   03/23/23 68.9 kg (152 lb)     · Diuretics held on admission in the setting of shock and KARINA, torsemide restarted and we will continue it for now.   · I/O, daily weight, low-sodium diet with fluid restriction when patient can resume diet

## 2023-06-29 NOTE — PROGRESS NOTES
Pastoral Care Progress Note    2023  Patient: Deborah Lu : 1940  Admission Date & Time: 2023  MRN: [id number] CSN: [id number]        Made /pastoral care introduction to PT, who did not seem to want to talk at present.  remains available.

## 2023-06-29 NOTE — NURSING NOTE
Pt discharged to [facility] for a pericardial window  Picked up by [facility] critical care paramedics at 2020  Chest tube in place  Midline is patent and intact  Nurse hand off report given to [facility] and transport team   All personal belonging given to patient and son

## 2023-06-29 NOTE — DISCHARGE INSTR - OTHER ORDERS
Skin Care Plan:  1-Preventative Hydraguard to bilateral heels and sacro-buttocks BID and PRN. 2-Turn/reposition q2h or when medically stable for pressure re-distribution on skin . 3-Elevate heels to offload pressure. 4-Moisturize skin daily with skin nourishing cream  5-Ehob cushion in chair when out of bed. 6-Left Posterior Tibia Wounds: Cleanse with NSS and pat dry. Apply Maxorb to Wound Beds. Cover with ABDs, and secure with cari wrap. Change every other day or PRN. 7-Ambulatory Referral to Outpatient wound center placed for continued treatment of left posterior leg wounds. Referral Placed. Schedule Follow-up Outpatient Wound Appointment. Ambulatory Referral Placed.    Call Outpatient Wound and Ostomy Care Team @ [phone]   Option 1: Corwin Vasquez  Option 2: Spencer Badillo [facility]

## 2023-06-29 NOTE — ASSESSMENT & PLAN NOTE
Lab Results   Component Value Date    EGFR 20 06/29/2023    EGFR 20 06/28/2023    EGFR 17 06/27/2023    CREATININE 2.75 (H) 06/29/2023    CREATININE 2.75 (H) 06/28/2023    CREATININE 3.05 (H) 06/27/2023   · Resolving, likely secondary to overdiuresis and septic shock  · Repeat BMP in the morning

## 2023-06-29 NOTE — ASSESSMENT & PLAN NOTE
· Dupont placed for urinary retention, can consider void trial as patient convalesces  · Does have history of BPH, continue Flomax.   Consider reaching out to urology for outpatient follow-up

## 2023-06-29 NOTE — ASSESSMENT & PLAN NOTE
Wt Readings from Last 3 Encounters:   06/29/23 74.9 kg (165 lb 3.2 oz)   06/28/23 74.5 kg (164 lb 3.9 oz)   05/11/23 66.7 kg (147 lb)     · Diuretics held on admission in the setting of shock and KARINA, torsemide restarted and we will continue it for now.   · I/O, daily weight, low-sodium diet with fluid restriction when patient can resume diet

## 2023-06-29 NOTE — WOUND OSTOMY CARE
Consult Note - Wound   Alok Plume 78 y.o. male MRN: [id number]  Unit/Bed#: [facility] 426-01 Encounter: [id number]        History and Present Illness:  Patient is an 77 yo male that was admitted to [facility] for treatment of Pericardial Effusion. Patient has a PMH of A-fib, diabetes, heart failure. Patient is a Moderate assist for turning and repositioning. Chest tube in place. Patient is incontinent of bowel and bladder. On assessment, patient is lying on regular mattress. Wound Care was consulted for sacrum, left hand, left calf, b/l heels, and feet wounds. Assessment Findings:   B/L heels are dry intact and princess with no skin loss or wounds present. Recommend preventative Hydraguard Cream and proper offloading/ repositioning.     - left heel     - right heel    1. Right Lateral Back Wound: Irregular in shape area of partial thickness skin loss. Wound bed is beefy red in color and bleeding. Ramandeep-wound is fragile. Scant sanguinous drainage noted. Recommend allevyn foam dressing to area for treatment. 2. Left Medial Thigh Blister: intact pink in colored bullae. No skin loss or drainage noted. Ramandeep-wound is fragile. 3M Applied to area. 3. Left Anterior Ankle: scattered intact, well adhered scabs photographed together. No skin loss, drainage, or wounds present. Leave MONSE. 4. Left Foot 3rd Toe Blister: circular in shape area of beefy red in color filled blister. No skin loss or drainage present. Ramandeep-wound is intact. Recommend 3M skin prep to area for treatment - skin prep applied. 5. Left Hand Skin Tear: Irregular in shape area of partial thickness skin loss. Wound bed is beefy red in color. Ramandeep-wound is fragile and purple in color related to ecchymosis/ bruising noted in area. Skin flap covers 80% of wound bed. Scant sanguinous drainage noted. Allevyn foam dressing applied to area. 6. Left Posterior Tibia Wounds: likely venous in etiology.  Scattered areas of partial thickness skin loss measured and pictured together. Wound beds are beefy red in color and are bleeding. Ramandeep-wounds are fragile. Small to moderate sanguinous drainage noted. Recommend maxorb to wound beds and covering areas with ABD and cari. 7. Left Buttocks Wound: patient had area of full thickness skin loss on left buttocks on previous assessment from 6/26. Area is intact, pink, blanchable epithelial tissue with no skin loss or drainage present. Recommend application of hydraguard to area for protection and prevention. 8. POA Mid Sacrum Unstageable Pressure Injury: Resolved. Area is now intact, hyperpigmented blanchable tissue. No skin loss or drainage noted. No wound present in area. Recommend application of hydraguard to area for protection and prevention. No induration, fluctuance, odor, warmth/temperature differences, redness, or purulence noted to the above noted wounds and skin areas assessed. New dressings applied per orders listed below. Patient tolerated well- no s/s of non-verbal pain or discomfort observed during the encounter. Bedside nurse aware of plan of care. See flow sheets for more detailed assessment findings. Orders listed below and wound care will continue to follow, call or tiger text with questions. Skin Care Plan:  1-Preventative Hydraguard to bilateral heels and sacro-buttocks BID and PRN. 2-Turn/reposition q2h or when medically stable for pressure re-distribution on skin . 3-Elevate heels to offload pressure. 4-Moisturize skin daily with skin nourishing cream  5-Ehob cushion in chair when out of bed. 6-Left Medial Thigh and Left Foot 3rd Toe Blisters: Cleanse areas and pat dry. Apply 3M No Sting Skin Prep to areas daily. 7-Right Lateral Back and Left Hand Wounds: Cleanse with NSS and pat dry. Apply allevyn foam dressings to areas. Jaleel with T for Treatment and change every other day or PRN. 8-Left Posterior Tibia Wounds: Cleanse with NSS and pat dry.  Apply Maxorb to Wound [address] with ABDs, and secure with cari wrap. Change every other day or PRN. 9-Ambulatory Referral to Outpatient wound center placed for continued treatment of left posterior leg wounds. Referral Placed. Wounds:  Wound 06/24/23 Traumatic Abrasion(s) Ankle Anterior; Left (Active)   Wound Image   06/29/23 1025   Wound Description Intact 06/29/23 1300   Ramandeep-wound Assessment Intact 06/29/23 1300   Wound Length (cm) 0 cm 06/29/23 1300   Wound Width (cm) 0 cm 06/29/23 1300   Wound Depth (cm) 0 cm 06/29/23 1300   Wound Surface Area (cm^2) 0 cm^2 06/29/23 1300   Wound Volume (cm^3) 0 cm^3 06/29/23 1300   Calculated Wound Volume (cm^3) 0 cm^3 06/29/23 1300   Change in Wound Size % 100 06/29/23 1300   Drainage Amount None 06/29/23 1300   Drainage Description Other (Comment) 06/29/23 1300   Non-staged Wound Description Not applicable 98/34/45 9927   Treatments Cleansed;Site care 06/29/23 1300   Dressing Open to air 06/29/23 1300   Wound packed? No 06/29/23 1300   Dressing Changed Other (Comment) 06/29/23 1300   Patient Tolerance Tolerated well 06/29/23 1300   Dressing Status Other (Comment) 06/29/23 1300       Wound 06/24/23 Diabetic Ulcer Toe (Comment  which one) Anterior; Left (Active)   Wound Image   06/29/23 1025   Wound Description Intact; Beefy red 06/29/23 1300   Pressure Injury Stage O 06/29/23 1300   Ramandeep-wound Assessment Intact 06/29/23 1300   Wound Length (cm) 0.5 cm 06/29/23 1025   Wound Width (cm) 0.5 cm 06/29/23 1025   Wound Depth (cm) 0 cm 06/29/23 1025   Wound Surface Area (cm^2) 0.25 cm^2 06/29/23 1025   Wound Volume (cm^3) 0 cm^3 06/29/23 1025   Calculated Wound Volume (cm^3) 0 cm^3 06/29/23 1025   Drainage Amount None 06/29/23 1300   Treatments Cleansed;Site care 06/29/23 1300   Dressing Protective barrier 06/29/23 1300   Wound packed?  No 06/29/23 1300   Dressing Changed New 06/29/23 1300   Patient Tolerance Tolerated well 06/29/23 1300   Dressing Status Intact 06/29/23 1300       Wound 06/24/23 Traumatic Skin tear Hand Left;Lateral (Active)   Wound Image   06/29/23 1026   Wound Description Beefy red 06/29/23 1026   Ramandeep-wound Assessment Fragile; Purple 06/29/23 1026   Wound Length (cm) 0.2 cm 06/29/23 1026   Wound Width (cm) 1 cm 06/29/23 1026   Wound Depth (cm) 0.1 cm 06/29/23 1026   Wound Surface Area (cm^2) 0.2 cm^2 06/29/23 1026   Wound Volume (cm^3) 0.02 cm^3 06/29/23 1026   Calculated Wound Volume (cm^3) 0.02 cm^3 06/29/23 1026   Change in Wound Size % 60 06/29/23 1026   Drainage Amount Scant 06/29/23 1026   Drainage Description Sanguineous 06/29/23 1026   Non-staged Wound Description Partial thickness 06/29/23 1026   Treatments Cleansed;Irrigation with NSS;Site care 06/29/23 1026   Dressing Foam, Silicon (eg. Allevyn, etc) 06/29/23 1026   Dressing Changed New 06/29/23 1026   Patient Tolerance Tolerated well 06/29/23 1026   Dressing Status Clean;Dry; Intact 06/29/23 1026       Wound 06/24/23 Venous Ulcer Tibial Left;Posterior (Active)   Wound Image   06/29/23 1024   Wound Description Beefy red;Bleeding 06/29/23 1026   Ramandeep-wound Assessment Fragile 06/29/23 1026   Wound Length (cm) 13 cm 06/29/23 1024   Wound Width (cm) 6 cm 06/29/23 1024   Wound Depth (cm) 0.1 cm 06/29/23 1024   Wound Surface Area (cm^2) 78 cm^2 06/29/23 1024   Wound Volume (cm^3) 7.8 cm^3 06/29/23 1024   Calculated Wound Volume (cm^3) 7.8 cm^3 06/29/23 1024   Change in Wound Size % 61 06/29/23 1024   Drainage Amount Small 06/29/23 1026   Drainage Description Sanguineous 06/29/23 1026   Non-staged Wound Description Partial thickness 06/29/23 1026   Treatments Cleansed;Irrigation with NSS;Site care 06/29/23 1026   Dressing Calcium Alginate;ABD;Dry dressing 06/29/23 1026   Wound packed? No 06/29/23 1026   Dressing Changed New 06/29/23 1026   Patient Tolerance Tolerated well 06/29/23 1026   Dressing Status Clean;Dry; Intact 06/29/23 1026       Wound 06/24/23 Pressure Injury Sacrum Mid (Active)   Wound Image   06/29/23 1022   Wound Description Intact 06/29/23 1026   Pressure Injury Stage U 06/26/23 1459   Ramandeep-wound Assessment Intact 06/29/23 1026   Wound Length (cm) 0 cm 06/29/23 1026   Wound Width (cm) 0 cm 06/29/23 1026   Wound Depth (cm) 0 cm 06/29/23 1026   Wound Surface Area (cm^2) 0 cm^2 06/29/23 1026   Wound Volume (cm^3) 0 cm^3 06/29/23 1026   Calculated Wound Volume (cm^3) 0 cm^3 06/29/23 1026   Change in Wound Size % 100 06/29/23 1026   Drainage Amount None 06/29/23 1026   Treatments Cleansed;Site care 06/29/23 1026   Dressing Moisture barrier 06/29/23 1026   Wound packed? No 06/29/23 1026   Dressing Changed New 06/29/23 1026   Patient Tolerance Tolerated well 06/29/23 1026   Dressing Status Intact 06/29/23 1026       Wound 06/24/23 Pressure Injury Buttocks Left (Active)   Wound Image   06/26/23 1456   Wound Description Intact; Epithelialization 06/29/23 1026   Pressure Injury Stage O 06/29/23 1026   Ramandeep-wound Assessment Intact 06/29/23 1026   Wound Length (cm) 0 cm 06/29/23 1026   Wound Width (cm) 0 cm 06/29/23 1026   Wound Depth (cm) 0 cm 06/29/23 1026   Wound Surface Area (cm^2) 0 cm^2 06/29/23 1026   Wound Volume (cm^3) 0 cm^3 06/29/23 1026   Calculated Wound Volume (cm^3) 0 cm^3 06/29/23 1026   Change in Wound Size % 100 06/29/23 1026   Drainage Amount None 06/29/23 1026   Non-staged Wound Description Not applicable 37/41/63 8753   Treatments Cleansed;Site care 06/29/23 1026   Dressing Moisture barrier 06/29/23 1026   Wound packed?  No 06/29/23 1026   Dressing Changed New 06/29/23 1026   Patient Tolerance Tolerated well 06/29/23 1026   Dressing Status Intact 06/29/23 1026       Wound 06/29/23 Back Lateral;Right (Active)   Wound Image   06/29/23 1022   Wound Description Beefy red;Bleeding 06/29/23 1300   Ramandeep-wound Assessment Fragile 06/29/23 1300   Wound Length (cm) 1 cm 06/29/23 1022   Wound Width (cm) 1.5 cm 06/29/23 1022   Wound Depth (cm) 0.1 cm 06/29/23 1022   Wound Surface Area (cm^2) 1.5 cm^2 06/29/23 1022   Wound Volume (cm^3) 0.15 cm^3 06/29/23 1022   Calculated Wound Volume (cm^3) 0.15 cm^3 06/29/23 1022   Drainage Amount Scant 06/29/23 1300   Drainage Description Sanguineous 06/29/23 1300   Non-staged Wound Description Partial thickness 06/29/23 1300   Treatments Cleansed;Irrigation with NSS;Site care 06/29/23 1300   Dressing Foam, Silicon (eg. Allevyn, etc) 06/29/23 1300   Dressing Changed New 06/29/23 1300   Patient Tolerance Tolerated well 06/29/23 1300   Dressing Status Clean; Intact;Dry 06/29/23 1300       Wound 06/29/23 Thigh Anterior;Left;Medial (Active)   Wound Image   06/29/23 1023   Wound Description Intact; Pink 06/29/23 1300   Ramandeep-wound Assessment Fragile 06/29/23 1300   Wound Length (cm) 0.6 cm 06/29/23 1023   Wound Width (cm) 4 cm 06/29/23 1023   Wound Depth (cm) 0.1 cm 06/29/23 1023   Wound Surface Area (cm^2) 2.4 cm^2 06/29/23 1023   Wound Volume (cm^3) 0.24 cm^3 06/29/23 1023   Calculated Wound Volume (cm^3) 0.24 cm^3 06/29/23 1023   Drainage Amount None 06/29/23 1300   Non-staged Wound Description Not applicable 41/71/31 0382   Treatments Cleansed;Site care 06/29/23 1300   Dressing Protective barrier 06/29/23 1300   Dressing Changed New 06/29/23 1300   Patient Tolerance Tolerated well 06/29/23 1300   Dressing Status Intact 06/29/23 247 [facility], VLADIMIR, [employer]

## 2023-06-30 ENCOUNTER — APPOINTMENT (INPATIENT)
Dept: NON INVASIVE DIAGNOSTICS | Facility: HOSPITAL | Age: 83
DRG: 314 | End: 2023-06-30
Payer: MEDICARE

## 2023-06-30 ENCOUNTER — APPOINTMENT (OUTPATIENT)
Dept: RADIOLOGY | Facility: HOSPITAL | Age: 83
DRG: 314 | End: 2023-06-30
Payer: MEDICARE

## 2023-06-30 LAB
ALBUMIN FLD-MCNC: 2.1 G/DL
AMYLASE FLD QL: 14 U/L
GLUCOSE FLD-MCNC: 113 MG/DL
GLUCOSE SERPL-MCNC: 109 MG/DL (ref 65–140)
GLUCOSE SERPL-MCNC: 112 MG/DL (ref 65–140)
GLUCOSE SERPL-MCNC: 138 MG/DL (ref 65–140)
GLUCOSE SERPL-MCNC: 78 MG/DL (ref 65–140)
LDH FLD L TO P-CCNC: 238 U/L
PH BODY FLUID: 7.5
PROT FLD-MCNC: 3.7 G/DL
RBC # FLD MANUAL: NORMAL /UL

## 2023-06-30 PROCEDURE — 93321 DOPPLER ECHO F-UP/LMTD STD: CPT | Performed by: INTERNAL MEDICINE

## 2023-06-30 PROCEDURE — 82945 GLUCOSE OTHER FLUID: CPT | Performed by: INTERNAL MEDICINE

## 2023-06-30 PROCEDURE — 93325 DOPPLER ECHO COLOR FLOW MAPG: CPT | Performed by: INTERNAL MEDICINE

## 2023-06-30 PROCEDURE — 71045 X-RAY EXAM CHEST 1 VIEW: CPT

## 2023-06-30 PROCEDURE — 86039 ANTINUCLEAR ANTIBODIES (ANA): CPT | Performed by: INTERNAL MEDICINE

## 2023-06-30 PROCEDURE — 87116 MYCOBACTERIA CULTURE: CPT | Performed by: INTERNAL MEDICINE

## 2023-06-30 PROCEDURE — 88305 TISSUE EXAM BY PATHOLOGIST: CPT | Performed by: PATHOLOGY

## 2023-06-30 PROCEDURE — 87206 SMEAR FLUORESCENT/ACID STAI: CPT | Performed by: INTERNAL MEDICINE

## 2023-06-30 PROCEDURE — 82948 REAGENT STRIP/BLOOD GLUCOSE: CPT

## 2023-06-30 PROCEDURE — C1887 CATHETER, GUIDING: HCPCS | Performed by: INTERNAL MEDICINE

## 2023-06-30 PROCEDURE — 87205 SMEAR GRAM STAIN: CPT | Performed by: INTERNAL MEDICINE

## 2023-06-30 PROCEDURE — 33016 PERICARDIOCENTESIS W/IMAGING: CPT | Performed by: INTERNAL MEDICINE

## 2023-06-30 PROCEDURE — 02HQ32Z INSERTION OF MONITORING DEVICE INTO RIGHT PULMONARY ARTERY, PERCUTANEOUS APPROACH: ICD-10-PCS | Performed by: INTERNAL MEDICINE

## 2023-06-30 PROCEDURE — 99232 SBSQ HOSP IP/OBS MODERATE 35: CPT | Performed by: THORACIC SURGERY (CARDIOTHORACIC VASCULAR SURGERY)

## 2023-06-30 PROCEDURE — 87102 FUNGUS ISOLATION CULTURE: CPT | Performed by: INTERNAL MEDICINE

## 2023-06-30 PROCEDURE — 4A133B3 MONITORING OF ARTERIAL PRESSURE, PULMONARY, PERCUTANEOUS APPROACH: ICD-10-PCS | Performed by: INTERNAL MEDICINE

## 2023-06-30 PROCEDURE — 93308 TTE F-UP OR LMTD: CPT

## 2023-06-30 PROCEDURE — 99223 1ST HOSP IP/OBS HIGH 75: CPT | Performed by: STUDENT IN AN ORGANIZED HEALTH CARE EDUCATION/TRAINING PROGRAM

## 2023-06-30 PROCEDURE — 83615 LACTATE (LD) (LDH) ENZYME: CPT | Performed by: INTERNAL MEDICINE

## 2023-06-30 PROCEDURE — 89050 BODY FLUID CELL COUNT: CPT | Performed by: INTERNAL MEDICINE

## 2023-06-30 PROCEDURE — 82042 OTHER SOURCE ALBUMIN QUAN EA: CPT | Performed by: INTERNAL MEDICINE

## 2023-06-30 PROCEDURE — 88112 CYTOPATH CELL ENHANCE TECH: CPT | Performed by: PATHOLOGY

## 2023-06-30 PROCEDURE — 0W9D30Z DRAINAGE OF PERICARDIAL CAVITY WITH DRAINAGE DEVICE, PERCUTANEOUS APPROACH: ICD-10-PCS | Performed by: INTERNAL MEDICINE

## 2023-06-30 PROCEDURE — 99232 SBSQ HOSP IP/OBS MODERATE 35: CPT | Performed by: INTERNAL MEDICINE

## 2023-06-30 PROCEDURE — 93308 TTE F-UP OR LMTD: CPT | Performed by: INTERNAL MEDICINE

## 2023-06-30 PROCEDURE — 33017 PRCRD DRG 6YR+ W/O CGEN CAR: CPT | Performed by: INTERNAL MEDICINE

## 2023-06-30 PROCEDURE — 89051 BODY FLUID CELL COUNT: CPT | Performed by: INTERNAL MEDICINE

## 2023-06-30 PROCEDURE — 87070 CULTURE OTHR SPECIMN AEROBIC: CPT | Performed by: INTERNAL MEDICINE

## 2023-06-30 PROCEDURE — 84157 ASSAY OF PROTEIN OTHER: CPT | Performed by: INTERNAL MEDICINE

## 2023-06-30 PROCEDURE — 87075 CULTR BACTERIA EXCEPT BLOOD: CPT | Performed by: INTERNAL MEDICINE

## 2023-06-30 PROCEDURE — 93451 RIGHT HEART CATH: CPT | Performed by: INTERNAL MEDICINE

## 2023-06-30 PROCEDURE — 4A1239Z MONITORING OF CARDIAC OUTPUT, PERCUTANEOUS APPROACH: ICD-10-PCS | Performed by: INTERNAL MEDICINE

## 2023-06-30 PROCEDURE — C1894 INTRO/SHEATH, NON-LASER: HCPCS | Performed by: INTERNAL MEDICINE

## 2023-06-30 PROCEDURE — C1729 CATH, DRAINAGE: HCPCS | Performed by: INTERNAL MEDICINE

## 2023-06-30 PROCEDURE — 83986 ASSAY PH BODY FLUID NOS: CPT | Performed by: INTERNAL MEDICINE

## 2023-06-30 PROCEDURE — 82150 ASSAY OF AMYLASE: CPT | Performed by: INTERNAL MEDICINE

## 2023-06-30 RX ORDER — FENTANYL CITRATE 50 UG/ML
INJECTION, SOLUTION INTRAMUSCULAR; INTRAVENOUS CODE/TRAUMA/SEDATION MEDICATION
Status: DISCONTINUED | OUTPATIENT
Start: 2023-06-30 | End: 2023-06-30 | Stop reason: HOSPADM

## 2023-06-30 RX ORDER — ALBUMIN (HUMAN) 12.5 G/50ML
50 SOLUTION INTRAVENOUS ONCE
Status: COMPLETED | OUTPATIENT
Start: 2023-06-30 | End: 2023-06-30

## 2023-06-30 RX ORDER — ALBUMIN (HUMAN) 12.5 G/50ML
25 SOLUTION INTRAVENOUS ONCE
Status: DISCONTINUED | OUTPATIENT
Start: 2023-06-30 | End: 2023-06-30

## 2023-06-30 RX ORDER — CEFAZOLIN SODIUM 2 G/50ML
2000 SOLUTION INTRAVENOUS ONCE
Status: COMPLETED | OUTPATIENT
Start: 2023-06-30 | End: 2023-06-30

## 2023-06-30 RX ORDER — LIDOCAINE HYDROCHLORIDE 10 MG/ML
INJECTION, SOLUTION EPIDURAL; INFILTRATION; INTRACAUDAL; PERINEURAL CODE/TRAUMA/SEDATION MEDICATION
Status: DISCONTINUED | OUTPATIENT
Start: 2023-06-30 | End: 2023-06-30 | Stop reason: HOSPADM

## 2023-06-30 RX ORDER — MIDAZOLAM HYDROCHLORIDE 2 MG/2ML
INJECTION, SOLUTION INTRAMUSCULAR; INTRAVENOUS CODE/TRAUMA/SEDATION MEDICATION
Status: DISCONTINUED | OUTPATIENT
Start: 2023-06-30 | End: 2023-06-30 | Stop reason: HOSPADM

## 2023-06-30 RX ADMIN — TAMSULOSIN HYDROCHLORIDE 0.4 MG: 0.4 CAPSULE ORAL at 16:13

## 2023-06-30 RX ADMIN — DOCUSATE SODIUM 100 MG: 100 CAPSULE, LIQUID FILLED ORAL at 17:24

## 2023-06-30 RX ADMIN — HEPARIN SODIUM 5000 UNITS: 5000 INJECTION INTRAVENOUS; SUBCUTANEOUS at 22:43

## 2023-06-30 RX ADMIN — ATORVASTATIN CALCIUM 40 MG: 40 TABLET, FILM COATED ORAL at 16:13

## 2023-06-30 RX ADMIN — SENNOSIDES 17.2 MG: 8.6 TABLET, FILM COATED ORAL at 22:43

## 2023-06-30 RX ADMIN — ALBUMIN (HUMAN) 50 G: 0.25 INJECTION, SOLUTION INTRAVENOUS at 17:26

## 2023-06-30 RX ADMIN — HEPARIN SODIUM 5000 UNITS: 5000 INJECTION INTRAVENOUS; SUBCUTANEOUS at 16:12

## 2023-06-30 RX ADMIN — HEPARIN SODIUM 5000 UNITS: 5000 INJECTION INTRAVENOUS; SUBCUTANEOUS at 05:23

## 2023-06-30 RX ADMIN — TIMOLOL MALEATE 1 DROP: 5 SOLUTION/ DROPS OPHTHALMIC at 08:26

## 2023-06-30 RX ADMIN — CEFAZOLIN SODIUM 2000 MG: 2 SOLUTION INTRAVENOUS at 16:30

## 2023-06-30 NOTE — PROGRESS NOTES
Progress Note - Thoracic Surgery  : LORENZO Thoracic Surgery Resident on Lissett Warner 78 y.o. male MRN: [id number]  Unit/Bed#: [facility] 426-01 Encounter: [id number]      Assessment:  78 y.o. male with pericardial effusion without clinical signs of tamponade       Plan:  No plans for urgent surgical intervention  Continue clinical monitoring for signs of tamponade  CHF management per primary team      Subjective: Denies SoB      Objective:     Physical Exam:  GEN: NAD   Ab: Soft, NT/ND  Lung: Normal effort on RA, lying flat in bed  CV: RRR   Extrem: No CCE   Neuro: A+Ox3, sleeping comfortably flat when entered the room      I/O       06/28 0701  06/29 0700 06/29 0701 06/30 0700    P. O. 0 130    IV Piggyback 50     Total Intake(mL/kg) 50 (0.7) 130 (1.7)    Urine (mL/kg/hr) 1850 2150 (1.2)    Stool  0    Chest Tube 70 110    Total Output 1920 2260    Net -1870 -2130          Unmeasured Stool Occurrence  1 x          Lab, Imaging and other studies: I have personally reviewed pertinent reports.   , CBC with diff: No results found for: "WBC", "HGB", "HCT", "MCV", "PLT", "ADJUSTEDWBC", "RBC", "MCH", "MCHC", "RDW", "MPV", "NRBC", BMP/CMP: No results found for: "SODIUM", "K", "CL", "CO2", "ANIONGAP", "BUN", "CREATININE", "GLUCOSE", "CALCIUM", "AST", "ALT", "ALKPHOS", "PROT", "BILITOT", "EGFR"        Nestor Roberts MD  6/30/2023 2:57 AM

## 2023-06-30 NOTE — WOUND OSTOMY CARE
Consult Note - Wound   Leonor Daughters 78 y.o. male MRN: [id number]  Unit/Bed#: [facility] 426-01 Encounter: [id number]        History and Present Illness:  Wound care re-consulted for wounds. Wound Care Team assessed patient from head to toe yesterday. See wound care note from 6/29 for full assessment of wounds and recommendations. Orders are placed below. Wound care will continue to follow patient. Tiger Text with Questions or Concerns. Skin Care Plan:  1-Preventative Hydraguard to bilateral heels and sacro-buttocks BID and PRN. 2-Turn/reposition q2h or when medically stable for pressure re-distribution on skin . 3-Elevate heels to offload pressure. 4-Moisturize skin daily with skin nourishing cream  5-Ehob cushion in chair when out of bed. 6-Left Medial Thigh and Left Foot 3rd Toe Blisters: Cleanse areas and pat dry. Apply 3M No Sting Skin Prep to areas daily. 7-Right Lateral Back and Left Hand Wounds: Cleanse with NSS and pat dry. Apply allevyn foam dressings to areas. Jaleel with T for Treatment and change every other day or PRN. 8-Left Posterior Tibia Wounds: Cleanse with NSS and pat dry. Apply Maxorb to Wound Beds. Cover with ABDs, and secure with cari wrap. Change every other day or PRN. 9-Ambulatory Referral to Outpatient wound center placed for continued treatment of left posterior leg wounds. Referral Placed.      Bakari Greene RN, BSN, [facility]

## 2023-06-30 NOTE — ASSESSMENT & PLAN NOTE
Lab Results   Component Value Date    HGBA1C 8.3 (H) 02/23/2023       Recent Labs     06/29/23  1545 06/29/23  2105 06/30/23  0548 06/30/23  1027   POCGLU 174* 171* 78 112       Blood Sugar Average: Last 72 hrs:  · Holding dose of Lantus 10 tonight as patient will be n.p.o. for possible pericardial window.   Continue ISS  (P) 143

## 2023-06-30 NOTE — ASSESSMENT & PLAN NOTE
Lab Results   Component Value Date    EGFR 20 06/29/2023    EGFR 20 06/28/2023    EGFR 17 06/27/2023    CREATININE 2.75 (H) 06/29/2023    CREATININE 2.75 (H) 06/28/2023    CREATININE 3.05 (H) 06/27/2023   · Resolving, likely secondary cardiorenal  · Baseline creatinine 2.5-3  · Patient is status post pericardiocentesis and diuresis  · Critical care held diuresis 6/20/2023 when fluid shifts secondary to large pericardial drainage  · Repeat BMP in the morning  ·

## 2023-06-30 NOTE — ASSESSMENT & PLAN NOTE
· Status post chest tube, patient appears to be tolerating well  · Was on waterseal for transfer but ordered to be back on suction  · Appreciate thoracic surgery  · Pneumonia treated with 7 days antibiotic now completed

## 2023-06-30 NOTE — ASSESSMENT & PLAN NOTE
• Appears to been noted as far back as 2019 and is increasing in size  • Echo 6/29: LVEF 70% with large pericardial effusion that likely has hemodynamic effects. • S/p PA catheter guided pericardiocentesis 6/30. • Drain output: Cath Lab 150 cc  • Plan:  • Continue to monitor drain output and characteristic. • Follow-up cytology. • Consider clamping with any climbing hemodynamics. • Monitor pulmonary artery pressure and clamp with any decline  • Neurology following, appreciate recs  • Continue to hold Eliquis for now.

## 2023-06-30 NOTE — ASSESSMENT & PLAN NOTE
Lab Results   Component Value Date    EGFR 20 06/29/2023    EGFR 20 06/28/2023    EGFR 17 06/27/2023    CREATININE 2.75 (H) 06/29/2023    CREATININE 2.75 (H) 06/28/2023    CREATININE 3.05 (H) 06/27/2023      · On admission: 3.72  · Baseline:2.5-3  · Today: 2.75  · Likely in the setting of septic shock, hypotension, diuresis, possible component of cardiorenal syndrome. · Plan:  · Creatinine proving  · Close monitoring of hemodynamics.   · Monitor I's/O.  · Monitor daily creatinine

## 2023-06-30 NOTE — ASSESSMENT & PLAN NOTE
Wt Readings from Last 3 Encounters:   06/30/23 69.8 kg (153 lb 14.1 oz)   06/28/23 74.5 kg (164 lb 3.9 oz)   05/11/23 66.7 kg (147 lb)     · Diuretics held on admission in the setting of shock and KARINA,  · Torsemide 40 mg daily restarted and we will continue it for now.   · I/O, daily weight, low-sodium diet with fluid restriction

## 2023-06-30 NOTE — ASSESSMENT & PLAN NOTE
· Large acute on chronic pericardial effusion with tamponade physiology noted  · Sp percutaneous drainage   · Cardiology placed [facility] and pericardial drain on 6/30/2023  · Follow-up cultures   · TTE demonstrates improved effusion compared to prior  · PAC removed  · Plan to remove pericardial drain when there is less than 30 cc over 24 hours  · Currently holding Eliquis for now given recent procedure and extremely low prevent platelets  · Repeat transthoracic echo noting residual moderate sized fluid collection isolated to the cardiac apex with no overt evidence of tamponade. · Cardiology recommend repeat echo on Monday, diuresis given urine output.

## 2023-06-30 NOTE — ASSESSMENT & PLAN NOTE
· Medications: Rate controlled with Coreg 6.25 twice daily, anticoagulated with Eliquis  · Plan:  · Continue to hold anticoagulation in the setting of recent pericardiocentesis, consider resuming 7/1  · Hold Coreg at this time as with guarded hemodynamics. Resume as tolerated.

## 2023-06-30 NOTE — CASE MANAGEMENT
Case Management Assessment & Discharge Planning Note    Patient name Hector Conde  Location [facility] CATH LAB VIR/BE CATH * MRN [id number]  : 1940 Date 2023       Current Admission Date: 2023  Current Admission Diagnosis:Pericardial effusion   Patient Active Problem List    Diagnosis Date Noted   • Empyema of lung ([address]) 2023   • Hypoglycemia 2023   • Thrombocytopenia ([address]) 2023   • Diarrhea 2023   • Hypothermia 2023   • Right lower lobe consolidation ([address]) 2023   • Septic shock ([address]) 2023   • Urinary retention 2023   • Macrocytosis 2023   • Hyponatremia 2023   • Anemia 2023   • Chronic kidney disease-mineral and bone disorder 2023   • Advanced care planning/counseling discussion 2023   • Acute kidney injury superimposed on chronic kidney disease ([address]) 2022   • Benign hypertension with CKD (chronic kidney disease) stage IV ([address]) 2022   • Persistent proteinuria 2022   • COVID-19 2022   • Electrolyte abnormality 2022   • Cellulitis of left upper extremity 2021   • Atrial fibrillation with slow ventricular response ([address]) 2021   • Vitamin D deficiency 10/18/2019   • Chronic combined systolic and diastolic congestive heart failure ([address]) 2019   • Pericardial effusion 2019   • Leg edema 01/10/2019   • 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 ([address]) 01/10/2019   • PVC (premature ventricular contraction) 2018   • Essential hypertension 2018   • Closed traumatic displaced fracture of distal end of left radius with malunion 2018      LOS (days): 2  Geometric Mean LOS (GMLOS) (days): 4.90  Days to GMLOS:3.2     OBJECTIVE:    Risk of Unplanned Readmission Score: 20.58         Current admission status: Inpatient       Preferred Pharmacy:   [facility] Trell Beltre, [address] [place] [address] [address] [place] [address]  Phone: [phone] Fax: [phone]    Primary Care Provider: Diane Craig DO    Primary Insurance: MEDICARE  Secondary Insurance: BLUE CROSS    ASSESSMENT:  Joe Proxies    There are no active Health Care Proxies on file. Readmission Root Cause  30 Day Readmission: No    Patient Information  Admitted from[de-identified] Home  Mental Status: Confused  During Assessment patient was accompanied by: Not accompanied during assessment  Assessment information provided by[de-identified] Son  Primary Caregiver: Family  Caregiver's Name[de-identified] Karen Sue (son)  Caregiver's Relationship to Patient[de-identified] Family Member  Caregiver's Telephone Number[de-identified] [phone]  Support Systems: Son, Family members  [place] of Residence: [address] [place] [address] do you live in?: [address] [place] [address] entry access options.  Select all that apply.: No steps to enter home  Type of Current Residence: 2 story home  Upon entering residence, is there a bedroom on the main floor (no further steps)?: Yes  Upon entering residence, is there a bathroom on the main floor (no further steps)?: Yes  In the last 12 months, was there a time when you were not able to pay the mortgage or rent on time?: No  In the last 12 months, how many places have you lived?: 1  In the last 12 months, was there a time when you did not have a steady place to sleep or slept in a shelter (including now)?: No  Homeless/housing insecurity resource given?: N/A  Living Arrangements: Lives w/ Son, Lives w/ Extended Family  Is patient a ?: No    Activities of Daily Living Prior to Admission  Functional Status: Assistance  Completes ADLs independently?: No  Level of ADL dependence: Assistance  Ambulates independently?: No  Level of ambulatory dependence: Assistance  Does patient use assisted devices?: Yes  Assisted Devices (DME) used: Claudia Mcknight  Does patient currently own DME?: Yes  What DME does the patient currently own?: Claudia Mcknight  Does patient have a history of Outpatient Therapy (PT/OT)?: Yes  Does the patient have a history of Short-Term Rehab?: No  Does patient have a history of HHC?: Yes ([facility])  Does patient currently have [address]?: Yes (A nurse from [facility]  see pt 2x a week)    Current Home Health Care  Type of Current Home Care Services: Nurse visit  [address][de-identified] [address] Provider[de-identified] PCP    Patient Information Continued  Income Source: Pension/long term  Does patient have prescription coverage?: Yes  Within the past 12 months, you worried that your food would run out before you got the money to buy more.: Never true  Within the past 12 months, the food you bought just didn't last and you didn't have money to get more.: Never true  Food insecurity resource given?: N/A  Does patient receive dialysis treatments?: No  Does patient have a history of substance abuse?: No  Does patient have a history of Mental Health Diagnosis?: No         Means of Transportation  Means of Transport to Appts[de-identified] Family transport  In the past 12 months, has lack of transportation kept you from medical appointments or from getting medications?: No  In the past 12 months, has lack of transportation kept you from meetings, work, or from getting things needed for daily living?: No  Was application for public transport provided?: N/A        DISCHARGE DETAILS:    Discharge planning discussed with[de-identified] pt son via phone     Comments - Freedom of Choice: CM introduced herself and role to pt son via phone. No discharge recommendations were discussed at this time.   CM contacted family/caregiver?: Yes             Contacts  Patient Contacts: Carl Alcala (son)  Relationship to Patient[de-identified] Family  Contact Method: Phone  Phone Number: [phone]  Reason/Outcome: Emergency Contact, Discharge Planning, Continuity of Care                Treatment Team Recommendation: Other (TBD)  Discharge Destination Plan[de-identified] Other (TBD)  Transport at Discharge : Family               Additional Comments: CM spoke to pt son Colt Salazar via phone to introduce self and role and to conduct assessment. Pt son stated pt lives with him and pt sister in law in [place]. Pt son stated pt does not have a POA at this time. Pt son stated he is the primary caregiver for pt. Pt son stated pt has a Nurse visit 2x a week with Denise HAY. Pt son stated pt uses a walker to ambulate. Pt son stated once pt is ready for discharge, he will be able to provide transportation. CM will continue to follow.

## 2023-06-30 NOTE — ASSESSMENT & PLAN NOTE
Lab Results   Component Value Date    HGBA1C 8.3 (H) 02/23/2023       Recent Labs     06/29/23  2105 06/30/23  0548 06/30/23  1027 06/30/23  1605   POCGLU 171* 78 112 109       Blood Sugar Average: Last 72 hrs:  (P) 138.1715012006546270   · SSI Algorithm 2

## 2023-06-30 NOTE — ASSESSMENT & PLAN NOTE
· Diuretics held on admission in the setting of shock and KARINA  · Home medication: Torsemide 40 daily, Imdur 30 daily  · 6/24 Echo: EF 45%, PA pressures 40-45mmHg. Large pericardial effusion circumfrential to the heart, increased from previous.  No evidence of tamponade   · Plan  · Continue to monitor I/O, daily weight, low-sodium diet with fluid restriction when patient can resume diet  · Consider resuming diuresis 7/1  · Hold Imdur in setting of cardiac hemodynamics, consider resuming 7/1

## 2023-06-30 NOTE — ASSESSMENT & PLAN NOTE
· Initially Concern for right lower lobe pneumonia with moderate effusion    • S/p IR right chest tube placement on 6/24 with return of small amount of serous fluid  • Pleural fluid culture shows no growth of bacteria  -Negative for carcinoma  -Lymphocytic dominant  - LDH 44, protein <2.0  • Continue cefepime, day #4  • Started tPA/dornase on 6/25 -reevaluate daily to continue the need  • Strep pneumo/legionella urine antigens negative  • Pulmonary hygiene

## 2023-06-30 NOTE — ASSESSMENT & PLAN NOTE
• Currently stable, no evidence of bleeding.   o Continue to monitor CBC, Current HgB 11.4  o Consider Transfusion for Hemoglobin <7

## 2023-06-30 NOTE — ASSESSMENT & PLAN NOTE
· Initially Concern for right lower lobe pneumonia with moderate effusion    • S/p IR right chest tube placement on 6/24 with return of small amount of serous fluid  • Pleural fluid culture shows no growth of bacteria, Negative for carcinoma, Lymphocytic dominant, LDH 44, protein <2.0  • Completed 7-day course of cefepime  • S/P tPA/dornase on 6/25  • Likely transudative pleural effusion  • Plan:  • Continue chest tube for now, consider DC with declining output. • Continue to monitor off antibiotics.

## 2023-06-30 NOTE — CONSULTS
[address] [place] [address]  Consult  Name: Rosie Mack 78 y.o. male I MRN: [id number]  Unit/Bed#: [facility] 439-74 I Date of Admission: 6/28/2023   Date of Service: 6/30/2023 I Hospital Day: 2    Consults    Assessment/Plan   * Pericardial effusion  Assessment & Plan  • Appears to been noted as far back as 2019 and is increasing in size  • Echo 6/29: LVEF 70% with large pericardial effusion that likely has hemodynamic effects. • S/p PA catheter guided pericardiocentesis 6/30. • Drain output: Cath Lab 150 cc  • Plan:  • Continue to monitor drain output and characteristic. • Follow-up cytology. • Consider clamping with any climbing hemodynamics. • Monitor pulmonary artery pressure and clamp with any decline  • Neurology following, appreciate recs  • Continue to hold Eliquis for now. Right lower lobe consolidation [facility])  Assessment & Plan  · Initially Concern for right lower lobe pneumonia with moderate effusion    • S/p IR right chest tube placement on 6/24 with return of small amount of serous fluid  • Pleural fluid culture shows no growth of bacteria, Negative for carcinoma, Lymphocytic dominant, LDH 44, protein <2.0  • Completed 7-day course of cefepime  • S/P tPA/dornase on 6/25  • Likely transudative pleural effusion  • Plan:  • Continue chest tube for now, consider DC with declining output. • Continue to monitor off antibiotics. Acute kidney injury superimposed on chronic kidney disease [facility])  Assessment & Plan  Lab Results   Component Value Date    EGFR 20 06/29/2023    EGFR 20 06/28/2023    EGFR 17 06/27/2023    CREATININE 2.75 (H) 06/29/2023    CREATININE 2.75 (H) 06/28/2023    CREATININE 3.05 (H) 06/27/2023      · On admission: 3.72  · Baseline:2.5-3  · Today: 2.75  · Likely in the setting of septic shock, hypotension, diuresis, possible component of cardiorenal syndrome. · Plan:  · Creatinine proving  · Close monitoring of hemodynamics.   · Monitor I's/O.  · Monitor daily creatinine    Atrial fibrillation with slow ventricular response (HCC)  Assessment & Plan  · Medications: Rate controlled with Coreg 6.25 twice daily, anticoagulated with Eliquis  · Plan:  · Continue to hold anticoagulation in the setting of recent pericardiocentesis, consider resuming 7/1  · Hold Coreg at this time as with guarded hemodynamics. Resume as tolerated. Chronic combined systolic and diastolic congestive heart failure (HCC)  Assessment & Plan  · Diuretics held on admission in the setting of shock and KARINA  · Home medication: Torsemide 40 daily, Imdur 30 daily  · 6/24 Echo: EF 45%, PA pressures 40-45mmHg. Large pericardial effusion circumfrential to the heart, increased from previous. No evidence of tamponade   · Plan  · Continue to monitor I/O, daily weight, low-sodium diet with fluid restriction when patient can resume diet  · Consider resuming diuresis 7/1  · Hold Imdur in setting of cardiac hemodynamics, consider resuming 7/1        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 [facility])  Assessment & Plan  Lab Results   Component Value Date    HGBA1C 8.3 (H) 02/23/2023       Recent Labs     06/29/23  2105 06/30/23  0548 06/30/23  1027 06/30/23  1605   POCGLU 171* 78 112 109       Blood Sugar Average: Last 72 hrs:  (P) 138.5180531806144508   · SSI Algorithm 2    Anemia  Assessment & Plan  • Currently stable, no evidence of bleeding. o Continue to monitor CBC, Current HgB 11.4  o Consider Transfusion for Hemoglobin <7      Urinary retention  Assessment & Plan  · Continue Flomax. History of Present Illness     HPI: Alison Ansari is a 78 y.o. male with past medical history of atrial fibrillation chronically on Eliquis, type 2 diabetes mellitus, cardiomyopathy with EF 40 to 45% and grade 2 diastolic dysfunction, CKD 4, hypertension originally presented to [facility] with reported diarrhea.   Patient was found to have a right-sided empyema and a large pericardial effusion. Patient initially required ICU care for septic shock but was quickly weaned off vasopressors. Patient had a chest tube placed by interventional radiology 6/24 at [address] and was given tPA/DNase 6/25. An echocardiogram was obtained which did not reveal any evidence of tamponade but slightly worsening effusion with evidence of moderate pulmonary hypertension and hemodynamic impact the patient was transferred to [facility] for cardiology evaluation. Cardiology was consulted and elected for pericardiocentesis. Patient arrives to the ICU s/p PA catheter guided pericardial drainage. Patient arrives with no acute complaints.    -Patient had high 150 cc of serous pericardial fluid drained in the Cath Lab and the drain was left to open on arrival to the ICU. History obtained from chart review and the patient.         Historical Information   Past Medical History:  No date: Atrial fibrillation ([address])  No date: Chronic kidney disease  No date: Coronary artery disease  No date: Diabetes mellitus ([address])  No date: Hyperlipidemia  No date: Hypertension Past Surgical History:  No date: EYE SURGERY  6/24/2023: IR CHEST TUBE PLACEMENT  No date: SKIN CANCER EXCISION  No date: TONSILLECTOMY   Current Outpatient Medications   Medication Instructions   • allopurinol (ZYLOPRIM) 100 mg, Oral, 2 times daily   • ALPRAZolam (XANAX) 0.5 mg, Daily at bedtime   • ascorbic acid (VITAMIN C) 500 mg, Oral, Daily   • atorvastatin (LIPITOR) 40 mg, Oral, Daily with dinner   • Blood Pressure Monitor NILTON Does not apply, Daily   • carvedilol (COREG) 6.25 mg, Oral, 2 times daily with meals   • cholecalciferol (VITAMIN D3) 1,000 Units, Oral, Daily   • Eliquis 2.5 MG TAKE ONE TABLET BY MOUTH TWICE A DAY   • glimepiride (AMARYL) 4 mg, Oral, Daily with breakfast   • glimepiride (AMARYL) 2 mg, Oral, Daily with dinner   • isosorbide mononitrate (IMDUR) 30 mg, Oral, Daily   • latanoprost (XALATAN) 0.005 % ophthalmic solution 1 drop, Daily at bedtime   • sitaGLIPtin (JANUVIA) 25 mg, Oral, Daily   • tamsulosin (FLOMAX) 0.4 mg, Oral, Daily with dinner   • timolol (TIMOPTIC) 0.5 % ophthalmic solution 1 drop, Both Eyes, Daily   • Torsemide 40 mg, Oral, Daily   • ZINC OXIDE PO Oral, Daily    Allergies   Allergen Reactions   • Elemental Sulfur    • Sulfa Antibiotics       Social History     Tobacco Use   • Smoking status: Former   • Smokeless tobacco: Former   Vaping Use   • Vaping Use: Never used   Substance Use Topics   • Alcohol use: Not Currently     Alcohol/week: 0.0 standard drinks of alcohol     Comment: special occasions   • Drug use: Not Currently    Family History   Problem Relation Age of Onset   • Heart disease Mother    • Diabetes Father    • Vision loss Father    • Cancer Sister    • Diabetes Sister         Objective                            Vitals I/O      Most Recent Min/Max in 24hrs   Temp (!) 97.2 °F (36.2 °C) Temp  Min: 97.2 °F (36.2 °C)  Max: 98.3 °F (36.8 °C)   Pulse 89 Pulse  Min: 72  Max: 92   Resp 14 Resp  Min: 14  Max: 20   /62 BP  Min: 121/58  Max: 136/62   O2 Sat 99 % SpO2  Min: 96 %  Max: 99 %      Intake/Output Summary (Last 24 hours) at 6/30/2023 1700  Last data filed at 6/30/2023 1600  Gross per 24 hour   Intake 0 ml   Output 2960 ml   Net -2960 ml         No diet orders on file     Invasive Monitoring Physical exam   PA Catheter   Most Recent  Min/Max in 24hrs    PAP 34/9 PAP  Min: 34/9  Max: 34/9   CVP   No data recorded   CI    No data recorded   SVR   No data recorded          Physical Exam  Vitals and nursing note reviewed. Constitutional:       Appearance: Normal appearance. HENT:      Head: Normocephalic and atraumatic. Mouth/Throat:      Mouth: Mucous membranes are moist.      Pharynx: Oropharynx is clear. Cardiovascular:      Rate and Rhythm: Normal rate and regular rhythm. Pulses: Normal pulses. Comments: Pericardial drain in place.   Serous drainage. Pulmonary:      Effort: Pulmonary effort is normal.   Abdominal:      General: Abdomen is flat. Palpations: Abdomen is soft. Musculoskeletal:         General: Normal range of motion. Skin:     General: Skin is warm. Capillary Refill: Capillary refill takes less than 2 seconds. Neurological:      General: No focal deficit present. Mental Status: He is alert and oriented to person, place, and time. Diagnostic Studies    EKG: NSR  Imaging:  I have personally reviewed pertinent reports.        Medications:  Scheduled PRN   ascorbic acid, 500 mg, Daily  atorvastatin, 40 mg, Daily With Dinner  cholecalciferol, 2,000 Units, Daily  docusate sodium, 100 mg, BID  heparin (porcine), 5,000 Units, Q8H RASHAD  insulin lispro, 1-5 Units, TID AC  insulin lispro, 1-5 Units, HS  latanoprost, 1 drop, HS  polyethylene glycol, 17 g, Daily  senna, 2 tablet, HS  tamsulosin, 0.4 mg, Daily With Dinner  timolol, 1 drop, Daily          Continuous          Labs:    CBC    Recent Labs     06/29/23 0209   WBC 7.83   HGB 11.4*   HCT 34.6*   PLT 55*     BMP    Recent Labs     06/29/23 0209   SODIUM 135   K 4.8      CO2 28   AGAP 3   BUN 71*   CREATININE 2.75*   CALCIUM 8.3       Coags    No recent results     Additional Electrolytes  No recent results       Blood Gas    No recent results  No recent results LFTs  Recent Labs     06/29/23 0209   ALT 65   AST 42   ALKPHOS 145*   ALB 2.3*   TBILI 0.86       Infectious  No recent results  Glucose  Recent Labs     06/29/23 0209   GLUC 177*             [facility]

## 2023-06-30 NOTE — PROGRESS NOTES
Cardiology Team 2 Progress Note - June Jones 78 y.o. male MRN: [id number]    [facility] Encounter: [id number]          Subjective:   Patient seen and examined. No significant events overnight. Patient reports that he could not sleep last night, but denies orthopnea/PND . Denies lightheadedness, dizziness, syncope, headache, vision changes, diaphoresis, chest pain, palpitations, shortness of breath,  nausea, vomiting, abdominal pain or lower extremity edema. Hospital Course:   June Jones is a 77 y/o M w pmhx of A-fib on eliquis 2.5, T2DM a1c 7.1 4mo 5/11 not on insulin, CKD IV, HTN who prsented w 4 days of diarrhea. Visiting nurse recommended ER eval. In [facility] ER, met sepsis criteria. Received fluids, cefepime, pressors. Cr 3.7 (baseline 2.5-3), WBC 3, PLT 66, Na 127, Lobar consolidation on CXR, admitted to ICU. Weaned off pressors, chest tube placed for effusion. Echo revealed pericardial effusion, no signs of tamponade. Transferred to [facility] 6/28 for evaluation of effusion. Patient has been hemodynamically stable since transfer to [facility].    Cardiology was consulted for evaluation of the pericardial effusion. In consultation with other teams, it was decided to go ahead with pericardiocentesis over pericardial window with IR. The patient remained hemodynamically stable without tamponade physiology. Vitals: Blood pressure 132/65, pulse 84, temperature 97.6 °F (36.4 °C), resp. rate 17, height 5' 9" (1.753 m), weight 69.8 kg (153 lb 14.1 oz), SpO2 96 %. , Body mass index is 22.72 kg/m².,   Orthostatic Blood Pressures    Flowsheet Row Most Recent Value   Blood Pressure 132/65 filed at 06/30/2023 8919   Patient Position - Orthostatic VS Lying filed at 06/29/2023 0250            Intake/Output Summary (Last 24 hours) at 6/30/2023 0940  Last data filed at 6/30/2023 0601  Gross per 24 hour   Intake 130 ml   Output 2785 ml   Net -2655 ml       Review of System:  Review of system was conducted and was negative except for as stated in the subjective course.     Physical Exam:    GEN: Beau Mosqueda appears well, alert and oriented x 3, pleasant and cooperative   HEENT:  Normocephalic, atraumatic, anicteric, moist mucous membranes  NECK: No JVD or carotid bruits   HEART: irregular rhythm, regular rate, normal S1 and S2, no murmurs, clicks, gallops or rubs   LUNGS:  Diminished breath sounds bilaterally; respiration mildly labored  ABDOMEN:  Normoactive bowel sounds, soft, but mild distension   EXTREMITIES: peripheral pulses palpable; 2+ pitting edema to knees, b/l chronic venous stasis  NEURO: no gross focal findings; cranial nerves grossly intact   SKIN:  Dry, intact, warm to touch      Current Facility-Administered Medications:   •  ascorbic acid (VITAMIN C) tablet 500 mg, 500 mg, Oral, Daily, Heath Landau, 500 mg at 06/29/23 9983  •  atorvastatin (LIPITOR) tablet 40 mg, 40 mg, Oral, Daily With Trace Come, 40 mg at 06/29/23 1705  •  cholecalciferol (VITAMIN D3) tablet 2,000 Units, 2,000 Units, Oral, Daily, Heath Landau  •  docusate sodium (COLACE) capsule 100 mg, 100 mg, Oral, BID, Heath Landau, 100 mg at 06/29/23 1705  •  heparin (porcine) subcutaneous injection 5,000 Units, 5,000 Units, Subcutaneous, Q8H [address], Heath Landau, 5,000 Units at 06/30/23 5038  •  insulin lispro (HumaLOG) 100 units/mL subcutaneous injection 1-5 Units, 1-5 Units, Subcutaneous, TID AC, 1 Units at 06/29/23 1705 **AND** Fingerstick Glucose (POCT), , , TID AC, Heath Landau  •  insulin lispro (HumaLOG) 100 units/mL subcutaneous injection 1-5 Units, 1-5 Units, Subcutaneous, HS, Heath Landau, 1 Units at 06/29/23 2142  •  latanoprost (XALATAN) 0.005 % ophthalmic solution 1 drop, 1 drop, Both Eyes, HS, Heath Landau, 1 drop at 06/29/23 2142  •  polyethylene glycol (MIRALAX) packet 17 g, 17 g, Oral, Daily, Heath Landau  •  senna (SENOKOT) tablet 17.2 mg, 2 tablet, Oral, NADINE, Hunt Gentle Michele, 17.2 mg at 23 2219  •  tamsulosin (FLOMAX) capsule 0.4 mg, 0.4 mg, Oral, Daily With Dinner, Raine Tulio, 0.4 mg at 23 1705  •  timolol (TIMOPTIC) 0.5 % ophthalmic solution 1 drop, 1 drop, Both Eyes, Daily, Raine Tulio, 1 drop at 23 0826    Labs & Results:  Results from last 7 days   Lab Units 23  2357 23  2153   HS TNI 0HR ng/L  --  6   HS TNI 2HR ng/L 6  --          Results from last 7 days   Lab Units 23  0209 23  1155 23  0539   POTASSIUM mmol/L 4.8 5.2 4.5   CO2 mmol/L 28 25 23   CHLORIDE mmol/L 104 102 105   BUN mg/dL 71* 65* 76*   CREATININE mg/dL 2.75* 2.75* 3.05*     Results from last 7 days   Lab Units 23  0209 23  1155 23  0539   HEMOGLOBIN g/dL 11.4* 11.0* 9.7*   HEMATOCRIT % 34.6* 32.9* 29.4*   PLATELETS Thousands/uL 55* 49* 48*           Telemetry:   Personally reviewed by John :   A-fib rate controlled      [email or web address]  •  Left Ventricle: Left ventricular cavity size is normal. Wall thickness is mildly increased. There is mild concentric hypertrophy. The left ventricular ejection fraction is 45% by visual estimation. Systolic function is mildly reduced. There is mild global hypokinesis. •  IVS: There is excessive respiratory change in interventricular septal motion. Consider cardiac tamponade, mechanical ventilation or other causes. •  Right Ventricle: Wall thickness is increased. •  Left Atrium: The atrium is severely dilated. •  Right Atrium: The atrium is mildly dilated. •  Aortic Valve: There is mild regurgitation. •  Mitral Valve: There is mild regurgitation. •  Tricuspid Valve: There is mild regurgitation.  PA pressure around 40 to 45 mmHg. •  IVC/SVC: The inferior vena cava is normal in size. Respirophasic changes were blunted (less than 50% variation). •  Pericardium: There is a large pericardial effusion circumferential to the heart.  The fluid exhibits no internal echoes.  In some views circumferential diameter is around 3 cm.  As compared to previous study of 2019 -2021 effusion has increased.  No dense of diastolic collapse of RV and right atrial collapse noted consistent with tamponade. •  As compared to previous studies pericardial fusion have now large.  No evidence of diastolic collapse of RV noted. 6/29/2023  •  Left Ventricle: Wall thickness is mildly increased. There is concentric remodeling. The left ventricular ejection fraction is 70%. Systolic function is vigorous. Wall motion is normal.  •  Right Ventricle: Right ventricular cavity size is small. •  Left Atrium: The atrium is dilated. •  Right Atrium: The atrium is mildly dilated. •  Aortic Valve: There is mild regurgitation. •  Tricuspid Valve: There is mild regurgitation. The right ventricular systolic pressure is moderately elevated. The estimated right ventricular systolic pressure is 19.66 mmHg. •  Pericardium: There is a huge pericardial effusion circumferential to the heart. The largest diameter measures 3.8 cm. The fluid is likely hemodynamically significant. Echocardiographic signs of tamponade are likely masked by atrial fibrillation and pulmonary hypertension. The pericardium is thickened. VTE Prophylaxis: Heparin subcutaneous       Assessment:  Principal Problem:    Pericardial effusion  Active Problems:    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 (HCC)    Chronic combined systolic and diastolic congestive heart failure (HCC)    Atrial fibrillation with slow ventricular response (HCC)    Acute kidney injury superimposed on chronic kidney disease (HCC)    Anemia    Urinary retention    Septic shock (HCC)    Thrombocytopenia (HCC)    Empyema of lung ([address])        1. Pericardial effusion  -Appears to be first noted in 2019, though has increased in size over time.  Etiology is unclear at this point and has not been worked up previously  -Patient is largely asymptomatic and hemodynamically stable. Systolic blood pressures >964.   -Echocardiography this hospitalization reveals no diastolic collapse of R sided heart chambers, though on our review of the study, there does appear to be R sided heart strain. Repeat echo done 6/29 shows an effusion that appears to be increasing in max diameter from about 3cm to 3.8cm. PA pressure has increased from 40-45 mmHg to 51 mmHg indicating a compensation for the increasingly large effusion.   -The patient is hemodynamically stable without signs of tamponade physiology at this time. As the size of the effusion has steadily been increasing, intervention appears warranted. Considering low platelet count, Cr of 2.75, Alb of 2.3, history of Eliquis use, and the large size of effusion, pericardiocentesis would be preferred over pericardial window at this point. Plan:  1. Pericardial effusion  -IR pericardiocentesis today, NPO  -Avoid diuretics, beta blockers  -Hold eliquis   -Monitor on tele  -Monitor clinically      Thank you for involving us in the care of your patient. [address] [facility]  [place]    ** Please Note: Fluency DirectDictation voice to text software may have been used in the creation of this document.  **

## 2023-07-01 ENCOUNTER — APPOINTMENT (INPATIENT)
Dept: NON INVASIVE DIAGNOSTICS | Facility: HOSPITAL | Age: 83
DRG: 314 | End: 2023-07-01
Payer: MEDICARE

## 2023-07-01 ENCOUNTER — APPOINTMENT (OUTPATIENT)
Dept: RADIOLOGY | Facility: HOSPITAL | Age: 83
DRG: 314 | End: 2023-07-01
Payer: MEDICARE

## 2023-07-01 LAB
ANION GAP SERPL CALCULATED.3IONS-SCNC: 10 MMOL/L
ANION GAP SERPL CALCULATED.3IONS-SCNC: 10 MMOL/L
APICAL FOUR CHAMBER EJECTION FRACTION: 64 %
BACTERIA SPEC ANAEROBE CULT: NORMAL
BASOPHILS # BLD AUTO: 0.02 THOUSANDS/ÂΜL (ref 0–0.1)
BASOPHILS NFR BLD AUTO: 0 % (ref 0–1)
BUN SERPL-MCNC: 71 MG/DL (ref 5–25)
BUN SERPL-MCNC: 80 MG/DL (ref 5–25)
CA-I BLD-SCNC: 1.01 MMOL/L (ref 1.12–1.32)
CA-I BLD-SCNC: 1.12 MMOL/L (ref 1.12–1.32)
CALCIUM SERPL-MCNC: 8.2 MG/DL (ref 8.3–10.1)
CALCIUM SERPL-MCNC: 8.8 MG/DL (ref 8.3–10.1)
CHLORIDE SERPL-SCNC: 101 MMOL/L (ref 96–108)
CHLORIDE SERPL-SCNC: 105 MMOL/L (ref 96–108)
CO2 SERPL-SCNC: 22 MMOL/L (ref 21–32)
CO2 SERPL-SCNC: 24 MMOL/L (ref 21–32)
CREAT SERPL-MCNC: 2.69 MG/DL (ref 0.6–1.3)
CREAT SERPL-MCNC: 3.29 MG/DL (ref 0.6–1.3)
EOSINOPHIL # BLD AUTO: 0.01 THOUSAND/ÂΜL (ref 0–0.61)
EOSINOPHIL NFR BLD AUTO: 0 % (ref 0–6)
ERYTHROCYTE [DISTWIDTH] IN BLOOD BY AUTOMATED COUNT: 15.3 % (ref 11.6–15.1)
FRACTIONAL SHORTENING: 38 (ref 28–44)
GFR SERPL CREATININE-BSD FRML MDRD: 16 ML/MIN/1.73SQ M
GFR SERPL CREATININE-BSD FRML MDRD: 20 ML/MIN/1.73SQ M
GLUCOSE SERPL-MCNC: 147 MG/DL (ref 65–140)
GLUCOSE SERPL-MCNC: 155 MG/DL (ref 65–140)
GLUCOSE SERPL-MCNC: 194 MG/DL (ref 65–140)
GLUCOSE SERPL-MCNC: 259 MG/DL (ref 65–140)
GLUCOSE SERPL-MCNC: 284 MG/DL (ref 65–140)
GLUCOSE SERPL-MCNC: 297 MG/DL (ref 65–140)
GLUCOSE SERPL-MCNC: 389 MG/DL (ref 65–140)
GLUCOSE SERPL-MCNC: 400 MG/DL (ref 65–140)
HCT VFR BLD AUTO: 29 % (ref 36.5–49.3)
HGB BLD-MCNC: 9.5 G/DL (ref 12–17)
IMM GRANULOCYTES # BLD AUTO: 0.09 THOUSAND/UL (ref 0–0.2)
IMM GRANULOCYTES NFR BLD AUTO: 1 % (ref 0–2)
INTERVENTRICULAR SEPTUM IN DIASTOLE (PARASTERNAL SHORT AXIS VIEW): 0.7 CM
INTERVENTRICULAR SEPTUM: 0.7 CM (ref 0.6–1.1)
LEFT INTERNAL DIMENSION IN SYSTOLE: 2.5 CM (ref 2.1–4)
LEFT VENTRICULAR INTERNAL DIMENSION IN DIASTOLE: 4 CM (ref 3.5–6)
LEFT VENTRICULAR POSTERIOR WALL IN END DIASTOLE: 0.8 CM
LEFT VENTRICULAR STROKE VOLUME: 49 ML
LVSV (TEICH): 49 ML
LYMPHOCYTES # BLD AUTO: 0.67 THOUSANDS/ÂΜL (ref 0.6–4.47)
LYMPHOCYTES NFR BLD AUTO: 7 % (ref 14–44)
MAGNESIUM SERPL-MCNC: 2.2 MG/DL (ref 1.6–2.6)
MAGNESIUM SERPL-MCNC: 2.3 MG/DL (ref 1.6–2.6)
MCH RBC QN AUTO: 34.3 PG (ref 26.8–34.3)
MCHC RBC AUTO-ENTMCNC: 32.8 G/DL (ref 31.4–37.4)
MCV RBC AUTO: 105 FL (ref 82–98)
MONOCYTES # BLD AUTO: 0.55 THOUSAND/ÂΜL (ref 0.17–1.22)
MONOCYTES NFR BLD AUTO: 6 % (ref 4–12)
NEUTROPHILS # BLD AUTO: 8.54 THOUSANDS/ÂΜL (ref 1.85–7.62)
NEUTS SEG NFR BLD AUTO: 86 % (ref 43–75)
NRBC BLD AUTO-RTO: 0 /100 WBCS
PHOSPHATE SERPL-MCNC: 4.2 MG/DL (ref 2.3–4.1)
PHOSPHATE SERPL-MCNC: 4.4 MG/DL (ref 2.3–4.1)
PLATELET # BLD AUTO: 49 THOUSANDS/UL (ref 149–390)
PMV BLD AUTO: 10.8 FL (ref 8.9–12.7)
POTASSIUM SERPL-SCNC: 4.7 MMOL/L (ref 3.5–5.3)
POTASSIUM SERPL-SCNC: 4.8 MMOL/L (ref 3.5–5.3)
PROCALCITONIN SERPL-MCNC: 0.26 NG/ML
RA PRESSURE ESTIMATED: 7 MMHG
RBC # BLD AUTO: 2.77 MILLION/UL (ref 3.88–5.62)
RHODAMINE-AURAMINE STN SPEC: NORMAL
RV PSP: 38 MMHG
SL CV LV EF: 60
SL CV PED ECHO LEFT VENTRICLE DIASTOLIC VOLUME (MOD BIPLANE) 2D: 72 ML
SL CV PED ECHO LEFT VENTRICLE SYSTOLIC VOLUME (MOD BIPLANE) 2D: 23 ML
SODIUM SERPL-SCNC: 135 MMOL/L (ref 135–147)
SODIUM SERPL-SCNC: 137 MMOL/L (ref 135–147)
TR MAX PG: 31 MMHG
TR PEAK VELOCITY: 2.8 M/S
TRICUSPID VALVE PEAK REGURGITATION VELOCITY: 2.78 M/S
WBC # BLD AUTO: 9.88 THOUSAND/UL (ref 4.31–10.16)

## 2023-07-01 PROCEDURE — 71045 X-RAY EXAM CHEST 1 VIEW: CPT

## 2023-07-01 PROCEDURE — 82948 REAGENT STRIP/BLOOD GLUCOSE: CPT

## 2023-07-01 PROCEDURE — 84145 PROCALCITONIN (PCT): CPT | Performed by: STUDENT IN AN ORGANIZED HEALTH CARE EDUCATION/TRAINING PROGRAM

## 2023-07-01 PROCEDURE — 93321 DOPPLER ECHO F-UP/LMTD STD: CPT | Performed by: INTERNAL MEDICINE

## 2023-07-01 PROCEDURE — 93325 DOPPLER ECHO COLOR FLOW MAPG: CPT | Performed by: INTERNAL MEDICINE

## 2023-07-01 PROCEDURE — 82330 ASSAY OF CALCIUM: CPT | Performed by: PHYSICIAN ASSISTANT

## 2023-07-01 PROCEDURE — 99233 SBSQ HOSP IP/OBS HIGH 50: CPT | Performed by: STUDENT IN AN ORGANIZED HEALTH CARE EDUCATION/TRAINING PROGRAM

## 2023-07-01 PROCEDURE — 93325 DOPPLER ECHO COLOR FLOW MAPG: CPT

## 2023-07-01 PROCEDURE — 80048 BASIC METABOLIC PNL TOTAL CA: CPT | Performed by: PHYSICIAN ASSISTANT

## 2023-07-01 PROCEDURE — 93308 TTE F-UP OR LMTD: CPT | Performed by: INTERNAL MEDICINE

## 2023-07-01 PROCEDURE — 99232 SBSQ HOSP IP/OBS MODERATE 35: CPT | Performed by: INTERNAL MEDICINE

## 2023-07-01 PROCEDURE — 84100 ASSAY OF PHOSPHORUS: CPT | Performed by: PHYSICIAN ASSISTANT

## 2023-07-01 PROCEDURE — 85025 COMPLETE CBC W/AUTO DIFF WBC: CPT | Performed by: PHYSICIAN ASSISTANT

## 2023-07-01 PROCEDURE — 83735 ASSAY OF MAGNESIUM: CPT | Performed by: PHYSICIAN ASSISTANT

## 2023-07-01 PROCEDURE — NC001 PR NO CHARGE: Performed by: STUDENT IN AN ORGANIZED HEALTH CARE EDUCATION/TRAINING PROGRAM

## 2023-07-01 PROCEDURE — 93321 DOPPLER ECHO F-UP/LMTD STD: CPT

## 2023-07-01 PROCEDURE — 93308 TTE F-UP OR LMTD: CPT

## 2023-07-01 RX ORDER — TORSEMIDE 20 MG/1
40 TABLET ORAL DAILY
Status: DISCONTINUED | OUTPATIENT
Start: 2023-07-02 | End: 2023-07-01

## 2023-07-01 RX ORDER — CALCIUM GLUCONATE 20 MG/ML
2 INJECTION, SOLUTION INTRAVENOUS ONCE
Status: COMPLETED | OUTPATIENT
Start: 2023-07-01 | End: 2023-07-01

## 2023-07-01 RX ORDER — INSULIN LISPRO 100 [IU]/ML
5 INJECTION, SOLUTION INTRAVENOUS; SUBCUTANEOUS ONCE
Status: COMPLETED | OUTPATIENT
Start: 2023-07-01 | End: 2023-07-01

## 2023-07-01 RX ORDER — TORSEMIDE 20 MG/1
40 TABLET ORAL DAILY
Status: DISCONTINUED | OUTPATIENT
Start: 2023-07-01 | End: 2023-07-11 | Stop reason: HOSPADM

## 2023-07-01 RX ORDER — ALBUMIN (HUMAN) 12.5 G/50ML
50 SOLUTION INTRAVENOUS ONCE
Status: COMPLETED | OUTPATIENT
Start: 2023-07-01 | End: 2023-07-01

## 2023-07-01 RX ADMIN — INSULIN LISPRO 2 UNITS: 100 INJECTION, SOLUTION INTRAVENOUS; SUBCUTANEOUS at 11:03

## 2023-07-01 RX ADMIN — Medication 2000 UNITS: at 08:44

## 2023-07-01 RX ADMIN — TORSEMIDE 40 MG: 20 TABLET ORAL at 17:18

## 2023-07-01 RX ADMIN — DOCUSATE SODIUM 100 MG: 100 CAPSULE, LIQUID FILLED ORAL at 17:18

## 2023-07-01 RX ADMIN — SENNOSIDES 17.2 MG: 8.6 TABLET, FILM COATED ORAL at 21:41

## 2023-07-01 RX ADMIN — INSULIN LISPRO 3 UNITS: 100 INJECTION, SOLUTION INTRAVENOUS; SUBCUTANEOUS at 21:42

## 2023-07-01 RX ADMIN — DOCUSATE SODIUM 100 MG: 100 CAPSULE, LIQUID FILLED ORAL at 08:44

## 2023-07-01 RX ADMIN — INSULIN LISPRO 5 UNITS: 100 INJECTION, SOLUTION INTRAVENOUS; SUBCUTANEOUS at 16:38

## 2023-07-01 RX ADMIN — HEPARIN SODIUM 5000 UNITS: 5000 INJECTION INTRAVENOUS; SUBCUTANEOUS at 06:13

## 2023-07-01 RX ADMIN — CALCIUM GLUCONATE 2 G: 20 INJECTION, SOLUTION INTRAVENOUS at 06:12

## 2023-07-01 RX ADMIN — TAMSULOSIN HYDROCHLORIDE 0.4 MG: 0.4 CAPSULE ORAL at 17:18

## 2023-07-01 RX ADMIN — OXYCODONE HYDROCHLORIDE AND ACETAMINOPHEN 500 MG: 500 TABLET ORAL at 08:44

## 2023-07-01 RX ADMIN — INSULIN LISPRO 5 UNITS: 100 INJECTION, SOLUTION INTRAVENOUS; SUBCUTANEOUS at 17:21

## 2023-07-01 RX ADMIN — ALBUMIN (HUMAN) 50 G: 0.25 INJECTION, SOLUTION INTRAVENOUS at 02:26

## 2023-07-01 RX ADMIN — ATORVASTATIN CALCIUM 40 MG: 40 TABLET, FILM COATED ORAL at 17:18

## 2023-07-01 RX ADMIN — POLYETHYLENE GLYCOL 3350 17 G: 17 POWDER, FOR SOLUTION ORAL at 08:43

## 2023-07-01 RX ADMIN — INSULIN LISPRO 1 UNITS: 100 INJECTION, SOLUTION INTRAVENOUS; SUBCUTANEOUS at 08:43

## 2023-07-01 NOTE — INCIDENTAL FINDINGS
The following findings require follow up:  Radiographic finding  · Finding: Spinal degenerative changes are noted. Small sclerotic lesion within the left iliac bone. Bone island within the right iliac bone. · Finding: Left inguinal hernia containing a loop of nonobstructed proximal sigmoid colon. Small fat-containing right inguinal hernia.    Follow up required: Primary Care Follow-up   Follow up should be done within 1 month(s)    Please notify the following clinician to assist with the follow up:   Dr. Lydia Warner

## 2023-07-01 NOTE — ASSESSMENT & PLAN NOTE
Lab Results   Component Value Date    HGBA1C 8.3 (H) 02/23/2023       Recent Labs     06/30/23  0548 06/30/23  1027 06/30/23  1605 06/30/23  2242   POCGLU 78 112 109 138       Blood Sugar Average: Last 72 hrs:  (P) 138.125   · SSI Algorithm 2  · BG goal 140-180, WNL

## 2023-07-01 NOTE — PLAN OF CARE
Problem: PAIN - ADULT  Goal: Verbalizes/displays adequate comfort level or baseline comfort level  Description: Interventions:  - Encourage patient to monitor pain and request assistance  - Assess pain using appropriate pain scale  - Administer analgesics based on type and severity of pain and evaluate response  - Implement non-pharmacological measures as appropriate and evaluate response  - Consider cultural and social influences on pain and pain management  - Notify physician/advanced practitioner if interventions unsuccessful or patient reports new pain  Outcome: Progressing     Problem: Knowledge Deficit  Goal: Patient/family/caregiver demonstrates understanding of disease process, treatment plan, medications, and discharge instructions  Description: Complete learning assessment and assess knowledge base.   Interventions:  - Provide teaching at level of understanding  - Provide teaching via preferred learning methods  Outcome: Not Progressing     Problem: CARDIOVASCULAR - ADULT  Goal: Maintains optimal cardiac output and hemodynamic stability  Description: INTERVENTIONS:  - Monitor I/O, vital signs and rhythm  - Monitor for S/S and trends of decreased cardiac output  - Administer and titrate ordered vasoactive medications to optimize hemodynamic stability  - Assess quality of pulses, skin color and temperature  - Assess for signs of decreased coronary artery perfusion  - Instruct patient to report change in severity of symptoms  Outcome: Progressing  Goal: Absence of cardiac dysrhythmias or at baseline rhythm  Description: INTERVENTIONS:  - Continuous cardiac monitoring, vital signs, obtain 12 lead EKG if ordered  - Administer antiarrhythmic and heart rate control medications as ordered  - Monitor electrolytes and administer replacement therapy as ordered  Outcome: Progressing     Problem: RESPIRATORY - ADULT  Goal: Achieves optimal ventilation and oxygenation  Description: INTERVENTIONS:  - Assess for changes in respiratory status  - Assess for changes in mentation and behavior  - Position to facilitate oxygenation and minimize respiratory effort  - Oxygen administered by appropriate delivery if ordered  - Initiate smoking cessation education as indicated  - Encourage broncho-pulmonary hygiene including cough, deep breathe, Incentive Spirometry  - Assess the need for suctioning and aspirate as needed  - Assess and instruct to report SOB or any respiratory difficulty  - Respiratory Therapy support as indicated  Outcome: Progressing

## 2023-07-01 NOTE — PROGRESS NOTES
[address] [facility]  Progress Note  Name: Belkys Florez  MRN: [id number]  [facility] Date of Admission: 6/28/2023   Date of Service: 7/1/2023 I Hospital Day: 3    Assessment/Plan   * Pericardial effusion  Assessment & Plan  • Appears to been noted as far back as 2019 and is increasing in size  • Echo 6/29: LVEF 70% with large pericardial effusion that likely has hemodynamic effects. • S/p PA catheter guided pericardiocentesis 6/30. • Drain output: Cath Lab 150 cc  • Plan:  • Continue to monitor drain output and characteristic. • Follow-up cytology. • Consider clamping with any climbing hemodynamics. • Monitor pulmonary artery pressure and clamp with any decline  • Neurology following, appreciate recs  • Continue to hold Eliquis for now. Right lower lobe consolidation [facility])  Assessment & Plan  · Initially Concern for right lower lobe pneumonia with moderate effusion    • S/p IR right chest tube placement on 6/24 with return of small amount of serous fluid  • Pleural fluid culture shows no growth of bacteria, Negative for carcinoma, Lymphocytic dominant, LDH 44, protein <2.0  • Completed 7-day course of cefepime  • S/P tPA/dornase on 6/25  • Likely transudative pleural effusion  • Plan:  • Continue chest tube for now, consider DC with declining output. • Continue to monitor off antibiotics. • Monitor fever and WBC curve  • Consider abx as needed     Acute kidney injury superimposed on chronic kidney disease [facility])  Assessment & Plan  Lab Results   Component Value Date    EGFR 20 06/29/2023    EGFR 20 06/28/2023    EGFR 17 06/27/2023    CREATININE 2.75 (H) 06/29/2023    CREATININE 2.75 (H) 06/28/2023    CREATININE 3.05 (H) 06/27/2023      · On admission: 3.72  · Baseline:2.5-3  · Likely in the setting of septic shock, hypotension, diuresis, possible component of cardiorenal syndrome. · Plan:  · Creatinine improving  · Close monitoring of hemodynamics.   · Monitor I's/O.  · Monitor daily creatinine    Atrial fibrillation with slow ventricular response (HCC)  Assessment & Plan  · Medications: Rate controlled with Coreg 6.25 twice daily, anticoagulated with Eliquis  · Plan:  · Continue to hold anticoagulation in the setting of recent pericardiocentesis, consider resuming today given WNL drain output   · Hold Coreg at this time as with guarded hemodynamics. Resume as tolerated. Chronic combined systolic and diastolic congestive heart failure (HCC)  Assessment & Plan  · Diuretics held on admission in the setting of shock and KARINA  · Home medication: Torsemide 40 daily, Imdur 30 daily  · 6/24 Echo: EF 45%, PA pressures 40-45mmHg. Large pericardial effusion circumfrential to the heart, increased from previous. 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 o Continue to monitor CBC, Current HgB 11.4  o Consider Transfusion for Hemoglobin <7      Urinary retention  Assessment & Plan  · Continue Flomax  · Monitor Cr           ICU Core Measures     A: Assess, Prevent, and Manage Pain · Has pain been assessed? Yes  · Need for changes to pain regimen?  No   B: Both SAT/SAT  · N/A   C: Choice of Sedation · RASS Goal: 0 Alert and Calm  · Need for changes to sedation or analgesia regimen? No   D: Delirium · CAM-ICU: Negative   E: Early Mobility  · Plan for early mobility? Yes   F: Family Engagement · Plan for family engagement today? Yes       Review of Invasive Devices: [place] Plan: Continue for accurate I/O monitoring for 48 hours  Central access plan: Patient has multiple central venous catheters. Prophylaxis:  VTE VTE covered by:  heparin (porcine), Subcutaneous, 5,000 Units at 06/30/23 2243       Stress Ulcer  not ordered       Subjective   HPI/24hr events:  - Pericardiocentesis via pulm catheter -> 150cc output   - Upon return to ICU - 700 cc drained  - 25% 50g albumin given for hypotension with appropriate response   - Overnight: UOP decreased requiring repeat 50g of 25% albumin     Review of Systems   Constitutional: Negative for chills and fever. HENT: Negative for ear pain and sore throat. Eyes: Negative for pain and visual disturbance. Respiratory: Negative for cough and shortness of breath. Cardiovascular: Negative for chest pain and palpitations. Gastrointestinal: Negative for abdominal pain and vomiting. Genitourinary: Negative for dysuria and hematuria. Musculoskeletal: Negative for arthralgias and back pain. Skin: Negative for color change and rash. Neurological: Negative for seizures and syncope. All other systems reviewed and are negative. Objective                            Vitals I/O      Most Recent Min/Max in 24hrs   Temp 99.3 °F (37.4 °C) Temp  Min: 96.6 °F (35.9 °C)  Max: 99.3 °F (37.4 °C)   Pulse 96 Pulse  Min: 83  Max: 96   Resp 21 Resp  Min: 13  Max: 22   BP (!) 89/53 BP  Min: 88/49  Max: 138/82   O2 Sat 96 % SpO2  Min: 94 %  Max: 99 %      Intake/Output Summary (Last 24 hours) at 7/1/2023 0329  Last data filed at 7/1/2023 0200  Gross per 24 hour   Intake 760 ml   Output 2393 ml   Net -1633 ml         No diet orders on file     Invasive Monitoring Physical exam    Physical Exam  Vitals and nursing note reviewed. Constitutional:       General: He is not in acute distress. Appearance: He is well-developed. HENT:      Head: Normocephalic and atraumatic. Eyes:      Conjunctiva/sclera: Conjunctivae normal.   Cardiovascular:      Rate and Rhythm: Normal rate and regular rhythm. Heart sounds: No murmur heard. No friction rub. No gallop. Comments: Pericardial drain in place with appropriate output   Pulmonary:      Effort: Pulmonary effort is normal. No respiratory distress. Breath sounds: Normal breath sounds. Comments: R Chest tube in place to suction with appropriate output, tidaling   Abdominal:      Palpations: Abdomen is soft. Tenderness: There is no abdominal tenderness. Musculoskeletal:         General: No swelling. Cervical back: Neck supple. Skin:     General: Skin is warm and dry. Capillary Refill: Capillary refill takes less than 2 seconds. Neurological:      General: No focal deficit present. Mental Status: He is alert.    Psychiatric:         Mood and Affect: Mood normal.            Diagnostic Studies      EKG: Irregular   Imaging: Reviewed- consistent with pericardial effusion, catheter tip in place no PTX     Medications:  Scheduled PRN   ascorbic acid, 500 mg, Daily  atorvastatin, 40 mg, Daily With Dinner  cholecalciferol, 2,000 Units, Daily  docusate sodium, 100 mg, BID  heparin (porcine), 5,000 Units, Q8H RASHAD  insulin lispro, 1-5 Units, TID AC  insulin lispro, 1-5 Units, HS  latanoprost, 1 drop, HS  polyethylene glycol, 17 g, Daily  senna, 2 tablet, HS  tamsulosin, 0.4 mg, Daily With Dinner  timolol, 1 drop, Daily          Continuous          Labs:    CBC    No recent results  BMP    No recent results    Coags    No recent results     Additional Electrolytes  No recent results       Blood Gas    No recent results  No recent results LFTs  No recent results    Infectious  No recent results  Glucose  No recent results            Critical Care Time Delivered: Upon my evaluation, this patient had a high probability of imminent or life-threatening deterioration due to Pericardial effusion , which required my direct attention, intervention, and personal management. I have personally provided 15 minutes of critical care time, exclusive of procedures, teaching, family meetings, and any prior time recorded by providers other than myself.      Addison Horton PA-C

## 2023-07-01 NOTE — ASSESSMENT & PLAN NOTE
· Medications: Rate controlled with Coreg 6.25 twice daily, anticoagulated with Eliquis  · Plan:  · Continue to hold anticoagulation in the setting of recent pericardiocentesis, consider resuming today given WNL drain output   · Hold Coreg at this time as with guarded hemodynamics. Resume as tolerated.

## 2023-07-01 NOTE — PROGRESS NOTES
Critical Care Interval Transfer Note:    Please refer to progress note from earlier today for full details. Barriers to discharge:   · Cardiac Clearance for pericardial effusion with resumption of elequis/coreg/Imdur  · Disposition Planning      Consults: IP CONSULT TO THORACIC SURGERY  IP CONSULT TO CARDIOLOGY    Recommended to review admission imaging for incidental findings and document in discharge navigator: Incidental findings have been documented in discharge navigator. Patient and/or family was informed and they expressed understanding. Discharge Plan: Anticipate discharge in >72 hrs to discharge location to be determined pending rehab evaluations. [place] Plan: Voiding trial after improvement in ambulation   Central access plan: Will obtain peripheral access and discontinue prior to transfer      Patient seen and evaluated by Critical Care today and deemed to be appropriate for transfer to Med Surg with Telemetry. Spoke to Dr. Mark Jay  from [facility] to accept transfer. Critical care can be contacted via [employer] with any questions or concerns.

## 2023-07-01 NOTE — ASSESSMENT & PLAN NOTE
· Diuretics held on admission in the setting of shock and KARINA  · Home medication: Torsemide 40 daily, Imdur 30 daily  · 6/24 Echo: EF 45%, PA pressures 40-45mmHg. Large pericardial effusion circumfrential to the heart, increased from previous.  No evidence of tamponade   · Plan  · Continue to monitor I/O, daily weight, low-sodium diet with fluid restriction when patient can resume diet  · Consider resuming diuresis with home torsemide 7/1 as patient has been appropriately resuscitated   · Hold Imdur in setting of cardiac hemodynamics, consider resuming 7/1

## 2023-07-01 NOTE — PROGRESS NOTES
Progress Note - Cardiology   Beau Mosqueda 78 y.o. 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 Imaging also showed a large pericardial effusion which appeared significantly increased from baseline. Though there was no clinical evidence of cardiac tamponade, echocardiography was concerning for possible early hemodynamic changes for tamponade, and the patient was sent for cautious percutaneous drainage of the pericardial fluid with interventional cardiology on 6/30.   150cc of clear yellow fluid were drained immediately in the cath lab, with a pigtail left in the pericardial space for further gravity drainage. Given the large size of the pericardial effusion, chronicity, and evidence of elevated pulmonary pressures on echocardiography, slow drainage under PA catheter guidance was recommended to avoid a complication of acute RV failure due to rapid pericardial decompression. Post-pericardial drainage, PA catheter noted to show normal PA systolic and diastolic pressures with Lonnie > 2, indicating normal RV function. A further 750cc of dark yellow fluid drained from the pericardial drain over the past 24 hours. Repeat echocardiogram today showed pericardial fluid with focal stranding confined to the cardiac apex, with no evidence of fluid along the lateral RV and LV walls.  There is no evidence of hemodynamic compromise or septal bounce to indicate constrictive physiology. CXR shows pigtail in the pericardial space along the RV free wall. PA catheter today recording:  PA pressure: 45/15 (32)  PCWP: 22  CVP: 12    Numbers and echo findings indicate no evidence of RV compromise with mildly elevated PA systolic pressures likely driven by elevated wedge pressure. Patient may benefit from cautious IV diuresis. Renal output noted to be reduced this morning. Results of fluid studies with pH 7.5, LDH of 238, and protein 3.7. This would be consistent with an exudative process with likely similar etiology to the pleural effusion. Culture results pending. Will leave gravity drain in place for another 24 hours to monitor output. If no further drainage, repositioning could be considered to target remaining apical fluid. Subjective/Objective   Subjective: No chest pain, no shortness of breath. Overnight 750cc were drained from pericardium, which was then clamped.      Objective:     Vitals: BP 96/63 (BP Location: Left arm)   Pulse 95   Temp 98.1 °F (36.7 °C) (Core)   Resp (!) 32   Ht 5' 9" (1.753 m)   Wt 69.9 kg (154 lb)   SpO2 97%   BMI 22.74 kg/m²   Vitals:    07/01/23 0600 07/01/23 1000   Weight: 70.3 kg (154 lb 15.7 oz) 69.9 kg (154 lb)     Orthostatic Blood Pressures    Flowsheet Row Most Recent Value   Blood Pressure 96/63 filed at 07/01/2023 1200   Patient Position - Orthostatic VS Sitting filed at 07/01/2023 1200            Intake/Output Summary (Last 24 hours) at 7/1/2023 1318  Last data filed at 7/1/2023 1200  Gross per 24 hour   Intake 2300 ml   Output 2613 ml   Net -313 ml       Invasive Devices     Central Venous Catheter Line  Duration           Introducer 06/30/23 Internal jugular Right <1 day          Peripheral Intravenous Line  Duration           Long-Dwell Peripheral IV (Midline) 98/88/59 Right Basilic 6 days          Drain  Duration           Chest Tube Right 10.2 Fr. 6 days Urethral Catheter 16 Fr. 3 days    Closed/Suction Drain Pericardial Other (Comment) 6 Fr. <1 day                Physical Exam  Constitutional:       Appearance: Normal appearance. Cardiovascular:      Rate and Rhythm: Normal rate. Rhythm irregular. Pulmonary:      Effort: Respiratory distress present. Musculoskeletal:      Right lower leg: No edema. Left lower leg: No edema. Skin:     General: Skin is warm and dry. Neurological:      Mental Status: He is alert.

## 2023-07-01 NOTE — ASSESSMENT & PLAN NOTE
Lab Results   Component Value Date    EGFR 20 06/29/2023    EGFR 20 06/28/2023    EGFR 17 06/27/2023    CREATININE 2.75 (H) 06/29/2023    CREATININE 2.75 (H) 06/28/2023    CREATININE 3.05 (H) 06/27/2023      · On admission: 3.72  · Baseline:2.5-3  · Likely in the setting of septic shock, hypotension, diuresis, possible component of cardiorenal syndrome. · Plan:  · Creatinine improving  · Close monitoring of hemodynamics.   · Monitor I's/O.  · Monitor daily creatinine

## 2023-07-01 NOTE — PLAN OF CARE
Problem: Prexisting or High Potential for Compromised Skin Integrity  Goal: Skin integrity is maintained or improved  Description: INTERVENTIONS:  - Identify patients at risk for skin breakdown  - Assess and monitor skin integrity  - Assess and monitor nutrition and hydration status  - Monitor labs   - Assess for incontinence   - Turn and reposition patient  - Assist with mobility/ambulation  - Relieve pressure over bony prominences  - Avoid friction and shearing  - Provide appropriate hygiene as needed including keeping skin clean and dry  - Evaluate need for skin moisturizer/barrier cream  - Collaborate with interdisciplinary team   - Patient/family teaching  - Consider wound care consult   Outcome: Progressing     Problem: MOBILITY - ADULT  Goal: Maintain or return to baseline ADL function  Description: INTERVENTIONS:  -  Assess patient's ability to carry out ADLs; assess patient's baseline for ADL function and identify physical deficits which impact ability to perform ADLs (bathing, care of mouth/teeth, toileting, grooming, dressing, etc.)  - Assess/evaluate cause of self-care deficits   - Assess range of motion  - Assess patient's mobility; develop plan if impaired  - Assess patient's need for assistive devices and provide as appropriate  - Encourage maximum independence but intervene and supervise when necessary  - Involve family in performance of ADLs  - Assess for home care needs following discharge   - Consider OT consult to assist with ADL evaluation and planning for discharge  - Provide patient education as appropriate  Outcome: Progressing  Goal: Maintains/Returns to pre admission functional level  Description: INTERVENTIONS:  - Perform BMAT or MOVE assessment daily.   - Set and communicate daily mobility goal to care team and patient/family/caregiver. - Collaborate with rehabilitation services on mobility goals if consulted  - Perform Range of Motion 3 times a day.   - Reposition patient every 2 hours.  - Dangle patient 3 times a day  - Stand patient 3 times a day  - Ambulate patient 3 times a day  - Out of bed to chair 3 times a day   - Out of bed for meals 3 times a day  - Out of bed for toileting  - Record patient progress and toleration of activity level   Outcome: Progressing     Problem: PAIN - ADULT  Goal: Verbalizes/displays adequate comfort level or baseline comfort level  Description: Interventions:  - Encourage patient to monitor pain and request assistance  - Assess pain using appropriate pain scale  - Administer analgesics based on type and severity of pain and evaluate response  - Implement non-pharmacological measures as appropriate and evaluate response  - Consider cultural and social influences on pain and pain management  - Notify physician/advanced practitioner if interventions unsuccessful or patient reports new pain  Outcome: Progressing     Problem: INFECTION - ADULT  Goal: Absence or prevention of progression during hospitalization  Description: INTERVENTIONS:  - Assess and monitor for signs and symptoms of infection  - Monitor lab/diagnostic results  - Monitor all insertion sites, i.e. indwelling lines, tubes, and drains  - Monitor endotracheal if appropriate and nasal secretions for changes in amount and color  - [place] appropriate cooling/warming therapies per order  - Administer medications as ordered  - Instruct and encourage patient and family to use good hand hygiene technique  - Identify and instruct in appropriate isolation precautions for identified infection/condition  Outcome: Progressing  Goal: Absence of fever/infection during neutropenic period  Description: INTERVENTIONS:  - Monitor WBC    Outcome: Progressing     Problem: SAFETY ADULT  Goal: Maintain or return to baseline ADL function  Description: INTERVENTIONS:  -  Assess patient's ability to carry out ADLs; assess patient's baseline for ADL function and identify physical deficits which impact ability to perform ADLs (bathing, care of mouth/teeth, toileting, grooming, dressing, etc.)  - Assess/evaluate cause of self-care deficits   - Assess range of motion  - Assess patient's mobility; develop plan if impaired  - Assess patient's need for assistive devices and provide as appropriate  - Encourage maximum independence but intervene and supervise when necessary  - Involve family in performance of ADLs  - Assess for home care needs following discharge   - Consider OT consult to assist with ADL evaluation and planning for discharge  - Provide patient education as appropriate  Outcome: Progressing  Goal: Maintains/Returns to pre admission functional level  Description: INTERVENTIONS:  - Perform BMAT or MOVE assessment daily.   - Set and communicate daily mobility goal to care team and patient/family/caregiver. - Collaborate with rehabilitation services on mobility goals if consulted  - Perform Range of Motion 3 times a day. - Reposition patient every 2 hours.   - Dangle patient 3 times a day  - Stand patient 3 times a day  - Ambulate patient 3 times a day  - Out of bed to chair 3 times a day   - Out of bed for meals 3 times a day  - Out of bed for toileting  - Record patient progress and toleration of activity level   Outcome: Progressing  Goal: Patient will remain free of falls  Description: INTERVENTIONS:  - Educate patient/family on patient safety including physical limitations  - Instruct patient to call for assistance with activity   - Consult OT/PT to assist with strengthening/mobility   - Keep Call bell within reach  - Keep bed low and locked with side rails adjusted as appropriate  - Keep care items and personal belongings within reach  - Initiate and maintain comfort rounds  - Make Fall Risk Sign visible to staff  - Offer Toileting every 2 Hours, in advance of need  - Initiate/Maintain alarm  - Obtain necessary fall risk management equipment  - Apply yellow socks and bracelet for high fall risk patients  - Consider moving patient to room near nurses station  Outcome: Progressing     Problem: Knowledge Deficit  Goal: Patient/family/caregiver demonstrates understanding of disease process, treatment plan, medications, and discharge instructions  Description: Complete learning assessment and assess knowledge base.   Interventions:  - Provide teaching at level of understanding  - Provide teaching via preferred learning methods  Outcome: Progressing     Problem: CARDIOVASCULAR - ADULT  Goal: Maintains optimal cardiac output and hemodynamic stability  Description: INTERVENTIONS:  - Monitor I/O, vital signs and rhythm  - Monitor for S/S and trends of decreased cardiac output  - Administer and titrate ordered vasoactive medications to optimize hemodynamic stability  - Assess quality of pulses, skin color and temperature  - Assess for signs of decreased coronary artery perfusion  - Instruct patient to report change in severity of symptoms  Outcome: Progressing  Goal: Absence of cardiac dysrhythmias or at baseline rhythm  Description: INTERVENTIONS:  - Continuous cardiac monitoring, vital signs, obtain 12 lead EKG if ordered  - Administer antiarrhythmic and heart rate control medications as ordered  - Monitor electrolytes and administer replacement therapy as ordered  Outcome: Progressing     Problem: CARDIOVASCULAR - ADULT  Goal: Maintains optimal cardiac output and hemodynamic stability  Description: INTERVENTIONS:  - Monitor I/O, vital signs and rhythm  - Monitor for S/S and trends of decreased cardiac output  - Administer and titrate ordered vasoactive medications to optimize hemodynamic stability  - Assess quality of pulses, skin color and temperature  - Assess for signs of decreased coronary artery perfusion  - Instruct patient to report change in severity of symptoms  Outcome: Progressing  Goal: Absence of cardiac dysrhythmias or at baseline rhythm  Description: INTERVENTIONS:  - Continuous cardiac monitoring, vital signs, obtain 12 lead EKG if ordered  - Administer antiarrhythmic and heart rate control medications as ordered  - Monitor electrolytes and administer replacement therapy as ordered  Outcome: Progressing     Problem: RESPIRATORY - ADULT  Goal: Achieves optimal ventilation and oxygenation  Description: INTERVENTIONS:  - Assess for changes in respiratory status  - Assess for changes in mentation and behavior  - Position to facilitate oxygenation and minimize respiratory effort  - Oxygen administered by appropriate delivery if ordered  - Initiate smoking cessation education as indicated  - Encourage broncho-pulmonary hygiene including cough, deep breathe, Incentive Spirometry  - Assess the need for suctioning and aspirate as needed  - Assess and instruct to report SOB or any respiratory difficulty  - Respiratory Therapy support as indicated  Outcome: Progressing     Problem: CARDIOVASCULAR - ADULT  Goal: Maintains optimal cardiac output and hemodynamic stability  Description: INTERVENTIONS:  - Monitor I/O, vital signs and rhythm  - Monitor for S/S and trends of decreased cardiac output  - Administer and titrate ordered vasoactive medications to optimize hemodynamic stability  - Assess quality of pulses, skin color and temperature  - Assess for signs of decreased coronary artery perfusion  - Instruct patient to report change in severity of symptoms  Outcome: Progressing  Goal: Absence of cardiac dysrhythmias or at baseline rhythm  Description: INTERVENTIONS:  - Continuous cardiac monitoring, vital signs, obtain 12 lead EKG if ordered  - Administer antiarrhythmic and heart rate control medications as ordered  - Monitor electrolytes and administer replacement therapy as ordered  Outcome: Progressing

## 2023-07-01 NOTE — ASSESSMENT & PLAN NOTE
· Initially Concern for right lower lobe pneumonia with moderate effusion    • S/p IR right chest tube placement on 6/24 with return of small amount of serous fluid  • Pleural fluid culture shows no growth of bacteria, Negative for carcinoma, Lymphocytic dominant, LDH 44, protein <2.0  • Completed 7-day course of cefepime  • S/P tPA/dornase on 6/25  • Likely transudative pleural effusion  • Plan:  • Continue chest tube for now, consider DC with declining output. • Continue to monitor off antibiotics.   • Monitor fever and WBC curve  • Consider abx as needed

## 2023-07-02 PROBLEM — G93.40 ENCEPHALOPATHY: Status: ACTIVE | Noted: 2023-07-02

## 2023-07-02 LAB
ANION GAP SERPL CALCULATED.3IONS-SCNC: 4 MMOL/L
BASOPHILS # BLD AUTO: 0.02 THOUSANDS/ÂΜL (ref 0–0.1)
BASOPHILS NFR BLD AUTO: 0 % (ref 0–1)
BUN SERPL-MCNC: 92 MG/DL (ref 5–25)
CA-I BLD-SCNC: 1.08 MMOL/L (ref 1.12–1.32)
CALCIUM SERPL-MCNC: 8.4 MG/DL (ref 8.3–10.1)
CHLORIDE SERPL-SCNC: 101 MMOL/L (ref 96–108)
CO2 SERPL-SCNC: 29 MMOL/L (ref 21–32)
CREAT SERPL-MCNC: 2.99 MG/DL (ref 0.6–1.3)
EOSINOPHIL # BLD AUTO: 0.01 THOUSAND/ÂΜL (ref 0–0.61)
EOSINOPHIL NFR BLD AUTO: 0 % (ref 0–6)
ERYTHROCYTE [DISTWIDTH] IN BLOOD BY AUTOMATED COUNT: 15.5 % (ref 11.6–15.1)
GFR SERPL CREATININE-BSD FRML MDRD: 18 ML/MIN/1.73SQ M
GLUCOSE SERPL-MCNC: 263 MG/DL (ref 65–140)
GLUCOSE SERPL-MCNC: 321 MG/DL (ref 65–140)
GLUCOSE SERPL-MCNC: 326 MG/DL (ref 65–140)
GLUCOSE SERPL-MCNC: 355 MG/DL (ref 65–140)
GLUCOSE SERPL-MCNC: 357 MG/DL (ref 65–140)
HCT VFR BLD AUTO: 26.4 % (ref 36.5–49.3)
HGB BLD-MCNC: 8.7 G/DL (ref 12–17)
IMM GRANULOCYTES # BLD AUTO: 0.09 THOUSAND/UL (ref 0–0.2)
IMM GRANULOCYTES NFR BLD AUTO: 1 % (ref 0–2)
LYMPHOCYTES # BLD AUTO: 0.89 THOUSANDS/ÂΜL (ref 0.6–4.47)
LYMPHOCYTES NFR BLD AUTO: 7 % (ref 14–44)
MAGNESIUM SERPL-MCNC: 2.4 MG/DL (ref 1.6–2.6)
MCH RBC QN AUTO: 34.3 PG (ref 26.8–34.3)
MCHC RBC AUTO-ENTMCNC: 33 G/DL (ref 31.4–37.4)
MCV RBC AUTO: 104 FL (ref 82–98)
MONOCYTES # BLD AUTO: 0.78 THOUSAND/ÂΜL (ref 0.17–1.22)
MONOCYTES NFR BLD AUTO: 6 % (ref 4–12)
NEUTROPHILS # BLD AUTO: 10.77 THOUSANDS/ÂΜL (ref 1.85–7.62)
NEUTS SEG NFR BLD AUTO: 86 % (ref 43–75)
NRBC BLD AUTO-RTO: 0 /100 WBCS
PHOSPHATE SERPL-MCNC: 3.5 MG/DL (ref 2.3–4.1)
PLATELET # BLD AUTO: 81 THOUSANDS/UL (ref 149–390)
PMV BLD AUTO: 11.5 FL (ref 8.9–12.7)
POTASSIUM SERPL-SCNC: 4.8 MMOL/L (ref 3.5–5.3)
RBC # BLD AUTO: 2.54 MILLION/UL (ref 3.88–5.62)
SODIUM SERPL-SCNC: 134 MMOL/L (ref 135–147)
WBC # BLD AUTO: 12.56 THOUSAND/UL (ref 4.31–10.16)

## 2023-07-02 PROCEDURE — 83735 ASSAY OF MAGNESIUM: CPT | Performed by: PHYSICIAN ASSISTANT

## 2023-07-02 PROCEDURE — 82330 ASSAY OF CALCIUM: CPT | Performed by: PHYSICIAN ASSISTANT

## 2023-07-02 PROCEDURE — 99232 SBSQ HOSP IP/OBS MODERATE 35: CPT | Performed by: NURSE PRACTITIONER

## 2023-07-02 PROCEDURE — 84100 ASSAY OF PHOSPHORUS: CPT | Performed by: PHYSICIAN ASSISTANT

## 2023-07-02 PROCEDURE — 85025 COMPLETE CBC W/AUTO DIFF WBC: CPT | Performed by: PHYSICIAN ASSISTANT

## 2023-07-02 PROCEDURE — 82948 REAGENT STRIP/BLOOD GLUCOSE: CPT

## 2023-07-02 PROCEDURE — 80048 BASIC METABOLIC PNL TOTAL CA: CPT | Performed by: PHYSICIAN ASSISTANT

## 2023-07-02 PROCEDURE — 99232 SBSQ HOSP IP/OBS MODERATE 35: CPT | Performed by: INTERNAL MEDICINE

## 2023-07-02 RX ORDER — ONDANSETRON 2 MG/ML
4 INJECTION INTRAMUSCULAR; INTRAVENOUS ONCE
Status: DISCONTINUED | OUTPATIENT
Start: 2023-07-02 | End: 2023-07-11 | Stop reason: HOSPADM

## 2023-07-02 RX ORDER — METOPROLOL TARTRATE 5 MG/5ML
2.5 INJECTION INTRAVENOUS ONCE
Status: DISCONTINUED | OUTPATIENT
Start: 2023-07-02 | End: 2023-07-02

## 2023-07-02 RX ADMIN — TAMSULOSIN HYDROCHLORIDE 0.4 MG: 0.4 CAPSULE ORAL at 17:50

## 2023-07-02 RX ADMIN — TIMOLOL MALEATE 1 DROP: 5 SOLUTION/ DROPS OPHTHALMIC at 09:33

## 2023-07-02 RX ADMIN — INSULIN LISPRO 3 UNITS: 100 INJECTION, SOLUTION INTRAVENOUS; SUBCUTANEOUS at 06:05

## 2023-07-02 RX ADMIN — INSULIN LISPRO 3 UNITS: 100 INJECTION, SOLUTION INTRAVENOUS; SUBCUTANEOUS at 11:13

## 2023-07-02 RX ADMIN — Medication 2000 UNITS: at 09:31

## 2023-07-02 RX ADMIN — ATORVASTATIN CALCIUM 40 MG: 40 TABLET, FILM COATED ORAL at 17:50

## 2023-07-02 RX ADMIN — OXYCODONE HYDROCHLORIDE AND ACETAMINOPHEN 500 MG: 500 TABLET ORAL at 09:31

## 2023-07-02 RX ADMIN — LATANOPROST 1 DROP: 50 SOLUTION OPHTHALMIC at 21:39

## 2023-07-02 RX ADMIN — TORSEMIDE 40 MG: 20 TABLET ORAL at 09:31

## 2023-07-02 RX ADMIN — DOCUSATE SODIUM 100 MG: 100 CAPSULE, LIQUID FILLED ORAL at 09:31

## 2023-07-02 RX ADMIN — INSULIN LISPRO 4 UNITS: 100 INJECTION, SOLUTION INTRAVENOUS; SUBCUTANEOUS at 21:39

## 2023-07-02 RX ADMIN — INSULIN LISPRO 4 UNITS: 100 INJECTION, SOLUTION INTRAVENOUS; SUBCUTANEOUS at 17:51

## 2023-07-02 RX ADMIN — POLYETHYLENE GLYCOL 3350 17 G: 17 POWDER, FOR SOLUTION ORAL at 09:31

## 2023-07-02 NOTE — ASSESSMENT & PLAN NOTE
· Hemoglobin 11.0 present on admission  · Currently 8.7/26.4  · Platelet count with improvement at 81  · Continue to hold [facility]

## 2023-07-02 NOTE — PROGRESS NOTES
Cardiology Progress note. Unit/Bed#: [facility] 402-01 Encounter: [id number]        Tj Ruiz 78 y.o. male [id number]  Hospital Stay Days: 4    Assessment and Plan      Current Problem List   Principal Problem:    Pericardial effusion  Active Problems:    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 (HCC)    Chronic combined systolic and diastolic congestive heart failure (HCC)    Atrial fibrillation with slow ventricular response (HCC)    Acute kidney injury superimposed on chronic kidney disease (HCC)    Anemia    Urinary retention    Pneumonia    Encephalopathy    Assessment/Plan: This is a 64-year-old male with past medical history of heart failure preserved EF 60%, CKD, type 2 diabetes, atrial fibrillation on Eliquis who initially presented with complicated pneumonia with both pleural and pericardial effusion. Status post chest tube and antibiotics for pleural effusion. S/p pericardiocentesis with significant resolution of his pericardial fusion. Still has pericardial effusion at the apex. #Acute on chronic pericardial fusion with tamponade s/p pericardiocentesis  #Lung empyema status post chest tube placement this admission  #Complicated pneumonia s/p antibiotics this admission  #Atrial fibrillation  #KARINA on CKD  #Type 2 diabetes  # HFpEF 60%  # CAD    Plan:    · Patient still has persistent drainage from his pericardial drain although has significantly improved overnight. · We will keep pericardial drain in place for now. · Repeat echo tomorrow morning. · Continue to hold home Eliquis for history of A-fib. · Continue home torsemide 40 mg daily since patient still volume overloaded. · Rest of care per primary team.     Subjective     Pt admits to feeling well. Denies any chest pain or shortness of breath.   Objective     Vitals: Temp (24hrs), Av.5 °F (36.4 °C), Min:96.2 °F (35.7 °C), Max:98.1 °F (36.7 °C)  Current: Temperature: 97.9 °F (36.6 °C)  Patient Vitals for the past 24 hrs:   BP Temp Temp src Pulse Resp SpO2 Weight   07/02/23 1036 114/50 97.9 °F (36.6 °C) -- 90 17 98 % --   07/02/23 0930 (!) 117/48 -- -- (!) 106 -- 99 % --   07/02/23 0737 104/57 (!) 96.2 °F (35.7 °C) -- 99 18 98 % --   07/02/23 0731 104/57 -- -- 105 -- 98 % --   07/02/23 0647 -- -- -- -- -- -- 66.6 kg (146 lb 13.2 oz)   07/02/23 0137 104/57 97.6 °F (36.4 °C) -- 97 18 97 % --   07/01/23 2206 106/57 98.1 °F (36.7 °C) -- 80 18 97 % --   07/01/23 2022 111/58 (!) 97.4 °F (36.3 °C) Oral 91 20 98 % --   07/01/23 1847 111/61 (!) 97.4 °F (36.3 °C) Oral 72 18 96 % --   07/01/23 1700 111/60 -- -- 91 21 -- --   07/01/23 1600 128/61 97.6 °F (36.4 °C) Oral 90 (!) 32 -- --   07/01/23 1200 96/63 98.1 °F (36.7 °C) Core 95 (!) 32 97 % --    Body mass index is 21.68 kg/m². Physical Exam:  Physical Exam  Constitutional:       General: He is not in acute distress. Appearance: He is well-developed. He is not diaphoretic. HENT:      Head: Normocephalic and atraumatic. Nose: Nose normal.      Mouth/Throat:      Pharynx: No oropharyngeal exudate. Eyes:      General: No scleral icterus. Right eye: No discharge. Left eye: No discharge. Cardiovascular:      Rate and Rhythm: Normal rate. Rhythm irregular. Heart sounds: Normal heart sounds. No murmur heard. No friction rub. No gallop. Pulmonary:      Effort: Pulmonary effort is normal. No respiratory distress. Breath sounds: No stridor. No wheezing or rales. Abdominal:      General: Bowel sounds are normal. There is no distension. Palpations: Abdomen is soft. Tenderness: There is no abdominal tenderness. There is no guarding. Musculoskeletal:         General: Normal range of motion. Cervical back: Normal range of motion and neck supple. Skin:     General: Skin is warm. Findings: No erythema. Neurological:      Mental Status: He is alert and oriented to person, place, and time. Invasive Devices     Peripheral Intravenous Line  Duration           Long-Dwell Peripheral IV (Midline) 93/41/46 Right Basilic 7 days          Drain  Duration           Chest Tube Right 10.2 Fr. 7 days    Urethral Catheter 16 Fr. 4 days    Closed/Suction Drain Pericardial Other (Comment) 6 Fr. 1 day                    Labs:   Results from last 7 days   Lab Units 07/02/23  0423 07/01/23  0429 06/29/23  0209 06/28/23  1155 06/27/23  0539 06/26/23  0600   WBC Thousand/uL 12.56* 9.88 7.83 6.50 5.08 6.37   HEMOGLOBIN g/dL 8.7* 9.5* 11.4* 11.0* 9.7* 10.6*   HEMATOCRIT % 26.4* 29.0* 34.6* 32.9* 29.4* 31.7*   PLATELETS Thousands/uL 81* 49* 55* 49* 48* 52*   NEUTROS PCT % 86* 86* 69 73  --  59   MONOS PCT % 6 6 12 12  --  15*   EOS PCT % 0 0 1 1  --  0      Results from last 7 days   Lab Units 07/02/23  0423 07/01/23  1219 07/01/23  0429 06/29/23  0209 06/28/23  1155 06/27/23  0539 06/26/23  0600   SODIUM mmol/L 134* 135 137 135 132* 135 134*   POTASSIUM mmol/L 4.8 4.7 4.8 4.8 5.2 4.5 4.4   CHLORIDE mmol/L 101 101 105 104 102 105 103   CO2 mmol/L 29 24 22 28 25 23 21   BUN mg/dL 92* 80* 71* 71* 65* 76* 83*   CREATININE mg/dL 2.99* 3.29* 2.69* 2.75* 2.75* 3.05* 3.38*   CALCIUM mg/dL 8.4 8.8 8.2* 8.3 7.7* 7.3* 7.6*   ALK PHOS U/L  --   --   --  145* 124*  --   --    ALT U/L  --   --   --  65 54*  --   --    AST U/L  --   --   --  42 38  --   --    MAGNESIUM mg/dL 2.4 2.3 2.2  --   --  2.1 2.2   PHOSPHORUS mg/dL 3.5 4.2* 4.4*  --   --  3.5 3.6   INR   --   --   --   --   --   --  1.48*   EGFR ml/min/1.73sq m 18 16 20 20 20 17 15     Results from last 7 days   Lab Units 06/26/23  0600   INR  1.48*             No results found for: "PHART", "DPX2WJO", "PO2ART", "UIB1FVN", "O8XMOLEP", "BEART", "SOURCE"  No components found for: "HIV1X2"  No results found for: "HAV", "HEPAIGM", "HEPBIGM", "HEPBCAB", "HBEAG", "HEPCAB"  Lab Results   Component Value Date    SPEP See Comment 02/27/2023    UPEP See Comment 02/26/2023      Lab Results   Component Value Date    HGBA1C 8.3 (H) 02/23/2023    HGBA1C 8.5 10/18/2022    HGBA1C 7.1 02/04/2021    HGBA1C 7.1 02/04/2021     No results found for: "CHOL"   Lab Results   Component Value Date    HDL 37 (A) 08/20/2021    HDL 34 (L) 01/15/2019      Lab Results   Component Value Date    LDLCALC 54 01/15/2019      Lab Results   Component Value Date    TRIG 63 08/20/2021    TRIG 70 01/15/2019     No components found for: "PROCAL"      Micro:  Results from last 7 days   Lab Units 06/30/23  1341   GRAM STAIN RESULT  No Polys or Bacteria seen   BODY FLUID CULTURE, STERILE  No growth     Urinalysis:  No results found for: "AMPHETUR", "BDZUR", "COCAINEUR", "OPIATEUR", "PCPUR", "Kaela Britain", "ETOH", "ACTMNPHEN", "SALICYLATE"       Invalid input(s): "URIBILINOGEN"        Intake and Outputs:  I/O       06/30 0701  07/01 0700 07/01 0701  07/02 0700 07/02 0701 07/03 0700    P. O. 480 1620 354    I.V. (mL/kg)  30 (0.5)     NG/GT  30     IV Piggyback 480 100     Total Intake(mL/kg) 960 (13.7) 1780 (26.7) 354 (5.3)    Urine (mL/kg/hr) 1138 (0.7) 1010 (0.6) 300 (1)    Drains 860 300     Stool  0 0    Chest Tube 220 180 0    Total Output 2218 1490 300    Net -1258 +290 +54           Unmeasured Stool Occurrence  1 x 1 x        Nutrition:  Diet Cardiovascular; Sodium 2 GM; Fluid Restriction 1500 ML, Consistent Carbohydrate Diet Level 1 (4 carb servings/60 grams CHO/meal)  Radiology Results:   XR chest portable ICU   Final Result by Beti Jerez MD (07/01 1148)      Pulmonary artery catheter in main pulmonary artery. Persistent enlargement of the cardiac silhouette with pericardial drain. Right pleural pigtail catheter with stable small loculated right hydropneumothorax and mild bibasilar atelectasis.                Workstation performed: [id number]         XR chest portable ICU   Final Result by eBti Jerez MD (07/01 1611)      Persistent small loculated right hydropneumothorax with moderate left effusion and bibasilar atelectasis. Pulmonary artery catheter in right interlobar pulmonary artery, subsequently retracted.                Workstation performed: [id number]         XR chest portable ICU    (Results Pending)     Scheduled Medications:  ascorbic acid, 500 mg, Daily  atorvastatin, 40 mg, Daily With Dinner  cholecalciferol, 2,000 Units, Daily  docusate sodium, 100 mg, BID  insulin lispro, 1-5 Units, TID AC  insulin lispro, 1-5 Units, HS  latanoprost, 1 drop, HS  polyethylene glycol, 17 g, Daily  senna, 2 tablet, HS  tamsulosin, 0.4 mg, Daily With Dinner  timolol, 1 drop, Daily  torsemide, 40 mg, Daily      PRN MEDS:     Last 24 Hour Meds: :   Medication Administration - last 24 hours from 07/01/2023 1122 to 07/02/2023 1122       Date/Time Order Dose Route Action Action by     07/02/2023 0931 EDT ascorbic acid (VITAMIN C) tablet 500 mg 500 mg Oral Given Amandeep Route, ROBERTO     07/01/2023 1718 EDT atorvastatin (LIPITOR) tablet 40 mg 40 mg Oral Given [address] [place] [address]     07/02/2023 7649 EDT cholecalciferol (VITAMIN D3) tablet 2,000 Units 2,000 Units Oral Given Amandeep Route, ROBERTO     07/02/2023 9224 EDT docusate sodium (COLACE) capsule 100 mg 100 mg Oral Given Amandeep Route, ROBERTO     07/01/2023 1718 EDT docusate sodium (COLACE) capsule 100 mg 100 mg Oral Given [facility] Shaggy Benavides [place]     07/01/2023 2143 EDT latanoprost (XALATAN) 0.005 % ophthalmic solution 1 drop 1 drop Both Eyes Not Given Do De La O, ROBERTO     07/02/2023 0931 EDT polyethylene glycol (MIRALAX) packet 17 g 17 g Oral Given Amandeep Route, ROBERTO     07/01/2023 2141 EDT senna (SENOKOT) tablet 17.2 mg 17.2 mg Oral Given Ryan Navarro, ROBERTO     07/01/2023 1718 EDT tamsulosin (FLOMAX) capsule 0.4 mg 0.4 mg Oral Given [facility] Shaggy Benavides [place]     07/02/2023 8601 EDT timolol (TIMOPTIC) 0.5 % ophthalmic solution 1 drop 1 drop Both Eyes Given Amandeep Mindi, ROBERTO     07/02/2023 1113 EDT insulin lispro (HumaLOG) 100 units/mL subcutaneous injection 1-5 Units 3 Units Subcutaneous Given Vasu Estevez RN     07/02/2023 1399 EDT insulin lispro (HumaLOG) 100 units/mL subcutaneous injection 1-5 Units 3 Units Subcutaneous Given Ryan Navarro RN     07/01/2023 1638 EDT insulin lispro (HumaLOG) 100 units/mL subcutaneous injection 1-5 Units 5 Units Subcutaneous Given [address] Liz Benavides     07/01/2023 2142 EDT insulin lispro (HumaLOG) 100 units/mL subcutaneous injection 1-5 Units 3 Units Subcutaneous Given Ryan Navarro RN     07/01/2023 1721 EDT insulin lispro (HumaLOG) 100 units/mL subcutaneous injection 5 Units 5 Units Subcutaneous Given [address] Benavides     07/02/2023 9540 EDT torsemide (DEMADEX) tablet 40 mg 40 mg Oral Given Vasu Estevez RN     07/01/2023 1933 EDT torsemide (DEMADEX) tablet 40 mg -- Oral MAR Unhold [facility]     07/01/2023 1904 EDT torsemide (DEMADEX) tablet 40 mg -- Oral MAR Hold Automatic Transfer Provider     07/01/2023 1718 EDT torsemide (DEMADEX) tablet 40 mg 40 mg Oral Given [address] , RN          PLEASE NOTE:  This encounter was completed utilizing the [employer] Direct Speech Voice Recognition Software. Grammatical errors, random word insertions, pronoun errors and incomplete sentences are occasional consequences of the system due to software limitations, ambient noise and hardware issues. These may be missed by proof reading prior to affixing electronic signature. Any questions or concerns about the content, text or information contained within the body of this dictation should be directly addressed to the physician for clarification. Please do not hesitate to call me directly if you have any any questions or concerns.

## 2023-07-02 NOTE — RESTORATIVE TECHNICIAN NOTE
Restorative Technician Note      Patient Name: Eimr Diallo Tech Visit Date: 07/02/23  Note Type: Mobility  Patient Position Upon Consult: Supine  Activity Performed: Transferred  Assistive Device: Other (Comment) (HHA x 2)  Patient Position at End of Consult: All needs within reach; Bed/Chair alarm activated;  Bedside chair

## 2023-07-02 NOTE — ASSESSMENT & PLAN NOTE
· Dupont placed for urinary retention, can consider void trial   · Does have history of BPH, continue Flomax.    ·  Consider reaching out to urology for outpatient follow-up

## 2023-07-02 NOTE — PROGRESS NOTES
[address] [facility]  Progress Note  Name: Loren Rasmussen  MRN: [id number]  Unit/Bed#: [facility] 402-01 I Date of Admission: 6/28/2023   Date of Service: 7/2/2023 I Hospital Day: 4    Assessment/Plan   * Pericardial effusion  Assessment & Plan  · Large acute on chronic pericardial effusion with tamponade physiology noted  · Sp percutaneous drainage   · Cardiology placed [facility] and pericardial drain on 6/30/2023  · Follow-up cultures   · TTE demonstrates improved effusion compared to prior  · PAC removed  · Plan to remove pericardial drain when there is less than 30 cc over 24 hours  · Currently holding Eliquis for now given recent procedure and extremely low prevent platelets  · Repeat transthoracic echo noting residual moderate sized fluid collection isolated to the cardiac apex with no overt evidence of tamponade. · Cardiology recommend repeat echo on Monday, diuresis given urine output.     Empyema of lung ([address])  Assessment & Plan  · Status post chest tube, patient appears to be tolerating well  · Was on waterseal for transfer but ordered to be back on suction  · Appreciate thoracic surgery  · Pneumonia treated with 7 days antibiotic now completed    Anemia  Assessment & Plan  · Hemoglobin 11.0 present on admission  · Currently 8.7/26.4  · Platelet count with improvement at 81  · Continue to hold [facility]    Encephalopathy  Assessment & Plan  · Likely toxic/metabolic secondary to critical illness and delirium  · Continue delirium precautions  · Patient with difficulty sleeping  · Melatonin and mirtazapine were added and critical care    Pneumonia  Assessment & Plan  · Pneumonia C/F with right lower lobe empyema status post right chest tube by thoracic surgery  · Patient has completed 7-day cefepime course  · Remains afebrile, however slight increase in white count -continue to trend    Thrombocytopenia ([address])  Assessment & Plan  · Present on admission, patient appears to have baseline thrombocytopenia  · Low suspicion for HIT, 4T score would be 0-1 by my calculation  · Likely secondary to sepsis, critical illness  · Peripheral smear did not note any schistocytes or other concerning findings of TMA/TTP  · Holding full dose anticoagulation as well as holding subcu heparin  · Platelets with double increase today 49--> 81 will hold additional day repeat in a.m. Urinary retention  Assessment & Plan  · Dupont placed for urinary retention, can consider void trial   · Does have history of BPH, continue Flomax. ·  Consider reaching out to urology for outpatient follow-up    Acute kidney injury superimposed on chronic kidney disease [facility])  Assessment & Plan  Lab Results   Component Value Date    EGFR 20 06/29/2023    EGFR 20 06/28/2023    EGFR 17 06/27/2023    CREATININE 2.75 (H) 06/29/2023    CREATININE 2.75 (H) 06/28/2023    CREATININE 3.05 (H) 06/27/2023   · Resolving, likely secondary cardiorenal  · Baseline creatinine 2.5-3  · Patient is status post pericardiocentesis and diuresis  · Critical care held diuresis 6/20/2023 when fluid shifts secondary to large pericardial drainage  · Repeat BMP in the morning  ·     Atrial fibrillation with slow ventricular response (HCC)  Assessment & Plan  · We will discuss with cardiology when safe to start Eliquis    Chronic combined systolic and diastolic congestive heart failure (HCC)  Assessment & Plan  Wt Readings from Last 3 Encounters:   06/30/23 69.8 kg (153 lb 14.1 oz)   06/28/23 74.5 kg (164 lb 3.9 oz)   05/11/23 66.7 kg (147 lb)     · Diuretics held on admission in the setting of shock and KARINA,  · Torsemide 40 mg daily restarted and we will continue it for now.   · I/O, daily weight, low-sodium diet with fluid restriction         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 [facility])  Assessment & Plan  Lab Results   Component Value Date    HGBA1C 8.3 (H) 02/23/2023       Recent Labs     06/29/23  1545 06/29/23 2105 23  0548 23  1027   POCGLU 174* 171* 78 112       Blood Sugar Average: Last 72 hrs:  · Holding dose of Lantus 10 tonight as patient will be n.p.o. for possible pericardial window. Continue ISS  (P) 143           VTE Pharmacologic Prophylaxis:   High Risk (Score >/= 5) - Pharmacological DVT Prophylaxis Contraindicated. Sequential Compression Devices Ordered. low platelet count did improve today but would hold one more day to chk trend     Patient Centered Rounds: I performed bedside rounds with nursing staff today. Discussions with Specialists or Other Care Team Provider: nursing     Education and Discussions with Family / Patient: patient . Total Time Spent on Date of Encounter in care of patient: 45 minutes This time was spent on one or more of the following: performing physical exam; counseling and coordination of care; obtaining or reviewing history; documenting in the medical record; reviewing/ordering tests, medications or procedures; communicating with other healthcare professionals and discussing with patient's family/caregivers. Current Length of Stay: 4 day(s)  Current Patient Status: Inpatient   Certification Statement: The patient will continue to require additional inpatient hospital stay due to ongoing treatment and monitoring   Discharge Plan: Will need PT OT consult placed today since patient first day out of unit    Code Status: Level 3 - DNAR and DNI    Subjective:   Patient sitting up in chair breathing hard when questioned what he is doing he reports that he feels like he wants to vomit. He reports that he has been eating he states he has not yet had a bowel movement. Although nursing report he feels like he is going to have 1.   Discussed plan of care for now and plan for echocardiogram tomorrow    Objective:     Vitals:   Temp (24hrs), Av.7 °F (36.5 °C), Min:96.2 °F (35.7 °C), Max:99 °F (37.2 °C)    Temp:  [96.2 °F (35.7 °C)-99 °F (37.2 °C)] 99 °F (37.2 °C)  HR: [] 96  Resp:  [17-20] 17  BP: (104-117)/(48-61) 113/49  SpO2:  [96 %-99 %] 98 %  Body mass index is 21.68 kg/m². Input and Output Summary (last 24 hours): Intake/Output Summary (Last 24 hours) at 7/2/2023 1722  Last data filed at 7/2/2023 1500  Gross per 24 hour   Intake 384 ml   Output 1815 ml   Net -1431 ml       Physical Exam:   Physical Exam  Constitutional:       General: He is not in acute distress. Appearance: He is ill-appearing. He is not toxic-appearing or diaphoretic. HENT:      Head: Normocephalic and atraumatic. Ears:      Comments: Hearing aids in place     Mouth/Throat:      Pharynx: Oropharynx is clear. No oropharyngeal exudate. Eyes:      General:         Right eye: No discharge. Left eye: No discharge. Conjunctiva/sclera: Conjunctivae normal.   Cardiovascular:      Rate and Rhythm: Normal rate. Heart sounds: No murmur heard. No friction rub. No gallop. Pulmonary:      Effort: No respiratory distress. Breath sounds: No stridor. Wheezing and rales present. No rhonchi. Comments: Very poor effort  Chest tube in place   Chest:      Chest wall: No tenderness. Abdominal:      General: There is no distension. Palpations: There is no mass. Tenderness: There is no abdominal tenderness. There is no guarding or rebound. Hernia: No hernia is present. Genitourinary:     Comments: Urinary catheter in place   Musculoskeletal:         General: No swelling, tenderness, deformity or signs of injury. Right lower leg: Edema present. Left lower leg: Edema present. Comments: Left lower extremity was Kerlix wrap intact   Skin:     Coloration: Skin is not jaundiced or pale. Findings: No bruising, erythema, lesion or rash. Neurological:      Mental Status: He is alert.       Comments: Forgetful but alert and oriented x2-3          Additional Data:     Labs:  Results from last 7 days   Lab Units 07/02/23  0423   WBC Thousand/uL 12.56*   HEMOGLOBIN g/dL 8.7*   HEMATOCRIT % 26.4*   PLATELETS Thousands/uL 81*   NEUTROS PCT % 86*   LYMPHS PCT % 7*   MONOS PCT % 6   EOS PCT % 0     Results from last 7 days   Lab Units 07/02/23  0423 07/01/23  0429 06/29/23  0209   SODIUM mmol/L 134*   < > 135   POTASSIUM mmol/L 4.8   < > 4.8   CHLORIDE mmol/L 101   < > 104   CO2 mmol/L 29   < > 28   BUN mg/dL 92*   < > 71*   CREATININE mg/dL 2.99*   < > 2.75*   ANION GAP mmol/L 4   < > 3   CALCIUM mg/dL 8.4   < > 8.3   ALBUMIN g/dL  --   --  2.3*   TOTAL BILIRUBIN mg/dL  --   --  0.86   ALK PHOS U/L  --   --  145*   ALT U/L  --   --  65   AST U/L  --   --  42   GLUCOSE RANDOM mg/dL 263*   < > 177*    < > = values in this interval not displayed.      Results from last 7 days   Lab Units 06/26/23  0600   INR  1.48*     Results from last 7 days   Lab Units 07/02/23  1606 07/02/23  1034 07/02/23  0603 07/01/23  2104 07/01/23  1721 07/01/23  1637 07/01/23  1058 07/01/23  0753 07/01/23  0643 06/30/23  2242 06/30/23  1605 06/30/23  1027   POC GLUCOSE mg/dl 355* 321* 326* 297* 389* 400* 259* 194* 155* 138 109 112         Results from last 7 days   Lab Units 07/01/23  1219   PROCALCITONIN ng/ml 0.26*       Lines/Drains:  Invasive Devices     Peripheral Intravenous Line  Duration           Long-Dwell Peripheral IV (Midline) 99/87/91 Right Basilic 7 days          Drain  Duration           Chest Tube Right 10.2 Fr. 8 days    Urethral Catheter 16 Fr. 4 days    Closed/Suction Drain Pericardial Other (Comment) 6 Fr. 2 days              Urinary Catheter:  Goal for removal: Voiding trial when ambulation improves           Telemetry:  Telemetry Orders (From admission, onward)             24 Hour Telemetry Monitoring  Continuous x 24 Hours (Telem)        Question:  Reason for 24 Hour Telemetry  Answer:  PCI/EP study (including pacer and ICD implementation), Cardiac surgery, MI, abnormal cardiac cath, and chest pain- rule out MI                 Telemetry Reviewed: Normal Sinus Rhythm  Indication for Continued Telemetry Use: Woodrow/becky/endocarditis pericardial effusion             Imaging: Reviewed radiology reports from this admission including: chest xray    Recent Cultures (last 7 days):   Results from last 7 days   Lab Units 06/30/23  1341   GRAM STAIN RESULT  No Polys or Bacteria seen   BODY FLUID CULTURE, STERILE  No growth       Last 24 Hours Medication List:   Current Facility-Administered Medications   Medication Dose Route Frequency Provider Last Rate   • ascorbic acid  500 mg Oral Daily Humberto Carson PA-C     • atorvastatin  40 mg Oral Daily With Dinner Humberto Carson PA-C     • cholecalciferol  2,000 Units Oral Daily Humberto Carson PA-C     • docusate sodium  100 mg Oral BID Humberto Carson PA-C     • insulin lispro  1-5 Units Subcutaneous TID AC Humberto Carson PA-C     • insulin lispro  1-5 Units Subcutaneous HS Humberto Carson PA-C     • latanoprost  1 drop Both Eyes HS Humberto Carson PA-C     • ondansetron  4 mg Intravenous Once SUSANA Bunn     • polyethylene glycol  17 g Oral Daily Humberto Carson PA-C     • senna  2 tablet Oral HS Humberto Carson PA-C     • tamsulosin  0.4 mg Oral Daily With [employer]     • timolol  1 drop Both Eyes Daily Humberto Carson PA-C     • torsemide  40 mg Oral Daily Humberto Carson PA-C          Today, Patient Was Seen By: SUSANA Bunn    **Please Note: This note may have been constructed using a voice recognition system. **

## 2023-07-02 NOTE — ASSESSMENT & PLAN NOTE
· Present on admission, patient appears to have baseline thrombocytopenia  · Low suspicion for HIT, 4T score would be 0-1 by my calculation  · Likely secondary to sepsis, critical illness  · Peripheral smear did not note any schistocytes or other concerning findings of TMA/TTP  · Holding full dose anticoagulation as well as holding subcu heparin  · Platelets with double increase today 49--> 81 will hold additional day repeat in a.m.

## 2023-07-02 NOTE — ASSESSMENT & PLAN NOTE
· Pneumonia C/F with right lower lobe empyema status post right chest tube by thoracic surgery  · Patient has completed 7-day cefepime course  · Remains afebrile, however slight increase in white count -continue to trend

## 2023-07-02 NOTE — PLAN OF CARE
Problem: Prexisting or High Potential for Compromised Skin Integrity  Goal: Skin integrity is maintained or improved  Description: INTERVENTIONS:  - Identify patients at risk for skin breakdown  - Assess and monitor skin integrity  - Assess and monitor nutrition and hydration status  - Monitor labs   - Assess for incontinence   - Turn and reposition patient  - Assist with mobility/ambulation  - Relieve pressure over bony prominences  - Avoid friction and shearing  - Provide appropriate hygiene as needed including keeping skin clean and dry  - Evaluate need for skin moisturizer/barrier cream  - Collaborate with interdisciplinary team   - Patient/family teaching  - Consider wound care consult   Outcome: Progressing     Problem: MOBILITY - ADULT  Goal: Maintain or return to baseline ADL function  Description: INTERVENTIONS:  -  Assess patient's ability to carry out ADLs; assess patient's baseline for ADL function and identify physical deficits which impact ability to perform ADLs (bathing, care of mouth/teeth, toileting, grooming, dressing, etc.)  - Assess/evaluate cause of self-care deficits   - Assess range of motion  - Assess patient's mobility; develop plan if impaired  - Assess patient's need for assistive devices and provide as appropriate  - Encourage maximum independence but intervene and supervise when necessary  - Involve family in performance of ADLs  - Assess for home care needs following discharge   - Consider OT consult to assist with ADL evaluation and planning for discharge  - Provide patient education as appropriate  Outcome: Progressing  Goal: Maintains/Returns to pre admission functional level  Description: INTERVENTIONS:  - Perform BMAT or MOVE assessment daily.   - Set and communicate daily mobility goal to care team and patient/family/caregiver. - Collaborate with rehabilitation services on mobility goals if consulted  - Perform Range of Motion 4 times a day.   - Reposition patient every 2 hours.  - Dangle patient 4 times a day  - Stand patient 4 times a day  - Ambulate patient 4 times a day  - Out of bed to chair 3 times a day   - Out of bed for meals 3 times a day  - Out of bed for toileting  - Record patient progress and toleration of activity level   Outcome: Progressing     Problem: PAIN - ADULT  Goal: Verbalizes/displays adequate comfort level or baseline comfort level  Description: Interventions:  - Encourage patient to monitor pain and request assistance  - Assess pain using appropriate pain scale  - Administer analgesics based on type and severity of pain and evaluate response  - Implement non-pharmacological measures as appropriate and evaluate response  - Consider cultural and social influences on pain and pain management  - Notify physician/advanced practitioner if interventions unsuccessful or patient reports new pain  Outcome: Progressing     Problem: INFECTION - ADULT  Goal: Absence or prevention of progression during hospitalization  Description: INTERVENTIONS:  - Assess and monitor for signs and symptoms of infection  - Monitor lab/diagnostic results  - Monitor all insertion sites, i.e. indwelling lines, tubes, and drains  - Monitor endotracheal if appropriate and nasal secretions for changes in amount and color  - [place] appropriate cooling/warming therapies per order  - Administer medications as ordered  - Instruct and encourage patient and family to use good hand hygiene technique  - Identify and instruct in appropriate isolation precautions for identified infection/condition  Outcome: Progressing  Goal: Absence of fever/infection during neutropenic period  Description: INTERVENTIONS:  - Monitor WBC    Outcome: Progressing     Problem: SAFETY ADULT  Goal: Maintain or return to baseline ADL function  Description: INTERVENTIONS:  -  Assess patient's ability to carry out ADLs; assess patient's baseline for ADL function and identify physical deficits which impact ability to perform ADLs (bathing, care of mouth/teeth, toileting, grooming, dressing, etc.)  - Assess/evaluate cause of self-care deficits   - Assess range of motion  - Assess patient's mobility; develop plan if impaired  - Assess patient's need for assistive devices and provide as appropriate  - Encourage maximum independence but intervene and supervise when necessary  - Involve family in performance of ADLs  - Assess for home care needs following discharge   - Consider OT consult to assist with ADL evaluation and planning for discharge  - Provide patient education as appropriate  Outcome: Progressing  Goal: Maintains/Returns to pre admission functional level  Description: INTERVENTIONS:  - Perform BMAT or MOVE assessment daily.   - Set and communicate daily mobility goal to care team and patient/family/caregiver. - Collaborate with rehabilitation services on mobility goals if consulted  - Perform Range of Motion 4 times a day. - Reposition patient every 2 hours.   - Dangle patient 4 times a day  - Stand patient 4 times a day  - Ambulate patient 4 times a day  - Out of bed to chair 3 times a day   - Out of bed for meals 3 times a day  - Out of bed for toileting  - Record patient progress and toleration of activity level   Outcome: Progressing  Goal: Patient will remain free of falls  Description: INTERVENTIONS:  -  Assess patient's ability to carry out ADLs; assess patient's baseline for ADL function and identify physical deficits which impact ability to perform ADLs (bathing, care of mouth/teeth, toileting, grooming, dressing, etc.)  - Assess/evaluate cause of self-care deficits   - Assess range of motion  - Assess patient's mobility; develop plan if impaired  - Assess patient's need for assistive devices and provide as appropriate  - Encourage maximum independence but intervene and supervise when necessary  - Involve family in performance of ADLs  - Assess for home care needs following discharge   - Consider OT consult to assist with ADL evaluation and planning for discharge  - Provide patient education as appropriate  Outcome: Progressing     Problem: DISCHARGE PLANNING  Goal: Discharge to home or other facility with appropriate resources  Description: INTERVENTIONS:  - Identify barriers to discharge w/patient and caregiver  - Arrange for needed discharge resources and transportation as appropriate  - Identify discharge learning needs (meds, wound care, etc.)  - Arrange for interpretive services to assist at discharge as needed  - Refer to Case Management Department for coordinating discharge planning if the patient needs post-hospital services based on physician/advanced practitioner order or complex needs related to functional status, cognitive ability, or social support system  Outcome: Progressing     Problem: Knowledge Deficit  Goal: Patient/family/caregiver demonstrates understanding of disease process, treatment plan, medications, and discharge instructions  Description: Complete learning assessment and assess knowledge base. Interventions:  - Provide teaching at level of understanding  - Provide teaching via preferred learning methods  Outcome: Progressing     Problem: Nutrition/Hydration-ADULT  Goal: Nutrient/Hydration intake appropriate for improving, restoring or maintaining nutritional needs  Description: Monitor and assess patient's nutrition/hydration status for malnutrition. Collaborate with interdisciplinary team and initiate plan and interventions as ordered. Monitor patient's weight and dietary intake as ordered or per policy. Utilize nutrition screening tool and intervene as necessary. Determine patient's food preferences and provide high-protein, high-caloric foods as appropriate.      INTERVENTIONS:  - Monitor oral intake, urinary output, labs, and treatment plans  - Assess nutrition and hydration status and recommend course of action  - Evaluate amount of meals eaten  - Assist patient with eating if necessary   - Allow adequate time for meals  - Recommend/ encourage appropriate diets, oral nutritional supplements, and vitamin/mineral supplements  - Order, calculate, and assess calorie counts as needed  - Recommend, monitor, and adjust tube feedings and TPN/PPN based on assessed needs  - Assess need for intravenous fluids  - Provide specific nutrition/hydration education as appropriate  - Include patient/family/caregiver in decisions related to nutrition  Outcome: Progressing

## 2023-07-02 NOTE — ASSESSMENT & PLAN NOTE
· Likely toxic/metabolic secondary to critical illness and delirium  · Continue delirium precautions  · Patient with difficulty sleeping  · Melatonin and mirtazapine were added and critical care

## 2023-07-03 ENCOUNTER — APPOINTMENT (INPATIENT)
Dept: NON INVASIVE DIAGNOSTICS | Facility: HOSPITAL | Age: 83
DRG: 314 | End: 2023-07-03
Payer: MEDICARE

## 2023-07-03 LAB
ANION GAP SERPL CALCULATED.3IONS-SCNC: 5 MMOL/L
APICAL FOUR CHAMBER EJECTION FRACTION: 57 %
APPEARANCE FLD: ABNORMAL
BACTERIA SPEC ANAEROBE CULT: NO GROWTH
BACTERIA SPEC BFLD CULT: NO GROWTH
BASOPHILS # BLD AUTO: 0.02 THOUSANDS/ÂΜL (ref 0–0.1)
BASOPHILS NFR BLD AUTO: 0 % (ref 0–1)
BUN SERPL-MCNC: 116 MG/DL (ref 5–25)
CALCIUM SERPL-MCNC: 8.1 MG/DL (ref 8.3–10.1)
CHLORIDE SERPL-SCNC: 101 MMOL/L (ref 96–108)
CO2 SERPL-SCNC: 28 MMOL/L (ref 21–32)
COLOR FLD: ABNORMAL
CREAT SERPL-MCNC: 3 MG/DL (ref 0.6–1.3)
EOSINOPHIL # BLD AUTO: 0.03 THOUSAND/ÂΜL (ref 0–0.61)
EOSINOPHIL NFR BLD AUTO: 0 % (ref 0–6)
ERYTHROCYTE [DISTWIDTH] IN BLOOD BY AUTOMATED COUNT: 15.1 % (ref 11.6–15.1)
FUNGUS SPEC CULT: NORMAL
GFR SERPL CREATININE-BSD FRML MDRD: 18 ML/MIN/1.73SQ M
GLUCOSE SERPL-MCNC: 120 MG/DL (ref 65–140)
GLUCOSE SERPL-MCNC: 132 MG/DL (ref 65–140)
GLUCOSE SERPL-MCNC: 152 MG/DL (ref 65–140)
GLUCOSE SERPL-MCNC: 239 MG/DL (ref 65–140)
GLUCOSE SERPL-MCNC: 363 MG/DL (ref 65–140)
GRAM STN SPEC: NORMAL
HCT VFR BLD AUTO: 22.9 % (ref 36.5–49.3)
HGB BLD-MCNC: 7.9 G/DL (ref 12–17)
HISTIOCYTES NFR FLD: 7 %
IMM GRANULOCYTES # BLD AUTO: 0.1 THOUSAND/UL (ref 0–0.2)
IMM GRANULOCYTES NFR BLD AUTO: 1 % (ref 0–2)
LYMPHOCYTES # BLD AUTO: 1.09 THOUSANDS/ÂΜL (ref 0.6–4.47)
LYMPHOCYTES NFR BLD AUTO: 86 %
LYMPHOCYTES NFR BLD AUTO: 9 % (ref 14–44)
MCH RBC QN AUTO: 35 PG (ref 26.8–34.3)
MCHC RBC AUTO-ENTMCNC: 34.5 G/DL (ref 31.4–37.4)
MCV RBC AUTO: 101 FL (ref 82–98)
MONO+MESO NFR FLD MANUAL: 1 %
MONOCYTES # BLD AUTO: 0.8 THOUSAND/ÂΜL (ref 0.17–1.22)
MONOCYTES NFR BLD AUTO: 4 %
MONOCYTES NFR BLD AUTO: 6 % (ref 4–12)
NEUTROPHILS # BLD AUTO: 10.56 THOUSANDS/ÂΜL (ref 1.85–7.62)
NEUTS SEG NFR BLD AUTO: 2 %
NEUTS SEG NFR BLD AUTO: 84 % (ref 43–75)
NRBC BLD AUTO-RTO: 0 /100 WBCS
PATHOLOGIST INTERPRETATION: NORMAL
PATHOLOGY REVIEW: YES
PLATELET # BLD AUTO: 97 THOUSANDS/UL (ref 149–390)
PMV BLD AUTO: 10.6 FL (ref 8.9–12.7)
POTASSIUM SERPL-SCNC: 4.3 MMOL/L (ref 3.5–5.3)
RBC # BLD AUTO: 2.26 MILLION/UL (ref 3.88–5.62)
SITE: ABNORMAL
SL CV LV EF: 60
SODIUM SERPL-SCNC: 134 MMOL/L (ref 135–147)
TOTAL CELLS COUNTED SPEC: 100
WBC # BLD AUTO: 12.6 THOUSAND/UL (ref 4.31–10.16)
WBC # FLD MANUAL: 1280 /UL

## 2023-07-03 PROCEDURE — 85025 COMPLETE CBC W/AUTO DIFF WBC: CPT

## 2023-07-03 PROCEDURE — 97163 PT EVAL HIGH COMPLEX 45 MIN: CPT

## 2023-07-03 PROCEDURE — 82948 REAGENT STRIP/BLOOD GLUCOSE: CPT

## 2023-07-03 PROCEDURE — 93308 TTE F-UP OR LMTD: CPT | Performed by: INTERNAL MEDICINE

## 2023-07-03 PROCEDURE — 93321 DOPPLER ECHO F-UP/LMTD STD: CPT | Performed by: INTERNAL MEDICINE

## 2023-07-03 PROCEDURE — 93308 TTE F-UP OR LMTD: CPT

## 2023-07-03 PROCEDURE — 80048 BASIC METABOLIC PNL TOTAL CA: CPT

## 2023-07-03 PROCEDURE — 93325 DOPPLER ECHO COLOR FLOW MAPG: CPT | Performed by: INTERNAL MEDICINE

## 2023-07-03 PROCEDURE — 99232 SBSQ HOSP IP/OBS MODERATE 35: CPT | Performed by: INTERNAL MEDICINE

## 2023-07-03 PROCEDURE — 99232 SBSQ HOSP IP/OBS MODERATE 35: CPT | Performed by: NURSE PRACTITIONER

## 2023-07-03 PROCEDURE — 97167 OT EVAL HIGH COMPLEX 60 MIN: CPT

## 2023-07-03 RX ORDER — LANOLIN ALCOHOL/MO/W.PET/CERES
6 CREAM (GRAM) TOPICAL
Status: DISCONTINUED | OUTPATIENT
Start: 2023-07-03 | End: 2023-07-11 | Stop reason: HOSPADM

## 2023-07-03 RX ORDER — INSULIN LISPRO 100 [IU]/ML
1-5 INJECTION, SOLUTION INTRAVENOUS; SUBCUTANEOUS
Status: DISCONTINUED | OUTPATIENT
Start: 2023-07-04 | End: 2023-07-04

## 2023-07-03 RX ORDER — INSULIN GLARGINE 100 [IU]/ML
10 INJECTION, SOLUTION SUBCUTANEOUS
Status: DISCONTINUED | OUTPATIENT
Start: 2023-07-03 | End: 2023-07-04

## 2023-07-03 RX ORDER — INSULIN LISPRO 100 [IU]/ML
1-5 INJECTION, SOLUTION INTRAVENOUS; SUBCUTANEOUS
Status: DISCONTINUED | OUTPATIENT
Start: 2023-07-03 | End: 2023-07-11 | Stop reason: HOSPADM

## 2023-07-03 RX ORDER — INSULIN LISPRO 100 [IU]/ML
5 INJECTION, SOLUTION INTRAVENOUS; SUBCUTANEOUS
Status: DISCONTINUED | OUTPATIENT
Start: 2023-07-03 | End: 2023-07-04

## 2023-07-03 RX ADMIN — DOCUSATE SODIUM 100 MG: 100 CAPSULE, LIQUID FILLED ORAL at 08:46

## 2023-07-03 RX ADMIN — INSULIN LISPRO 4 UNITS: 100 INJECTION, SOLUTION INTRAVENOUS; SUBCUTANEOUS at 08:47

## 2023-07-03 RX ADMIN — INSULIN LISPRO 5 UNITS: 100 INJECTION, SOLUTION INTRAVENOUS; SUBCUTANEOUS at 12:08

## 2023-07-03 RX ADMIN — ATORVASTATIN CALCIUM 40 MG: 40 TABLET, FILM COATED ORAL at 17:21

## 2023-07-03 RX ADMIN — MELATONIN TAB 3 MG 6 MG: 3 TAB at 01:14

## 2023-07-03 RX ADMIN — INSULIN LISPRO 4 UNITS: 100 INJECTION, SOLUTION INTRAVENOUS; SUBCUTANEOUS at 06:11

## 2023-07-03 RX ADMIN — OXYCODONE HYDROCHLORIDE AND ACETAMINOPHEN 500 MG: 500 TABLET ORAL at 08:46

## 2023-07-03 RX ADMIN — MELATONIN TAB 3 MG 6 MG: 3 TAB at 22:26

## 2023-07-03 RX ADMIN — INSULIN LISPRO 1 UNITS: 100 INJECTION, SOLUTION INTRAVENOUS; SUBCUTANEOUS at 22:25

## 2023-07-03 RX ADMIN — INSULIN LISPRO 5 UNITS: 100 INJECTION, SOLUTION INTRAVENOUS; SUBCUTANEOUS at 08:50

## 2023-07-03 RX ADMIN — INSULIN GLARGINE 10 UNITS: 100 INJECTION, SOLUTION SUBCUTANEOUS at 22:25

## 2023-07-03 RX ADMIN — TAMSULOSIN HYDROCHLORIDE 0.4 MG: 0.4 CAPSULE ORAL at 17:21

## 2023-07-03 RX ADMIN — LATANOPROST 1 DROP: 50 SOLUTION OPHTHALMIC at 22:27

## 2023-07-03 RX ADMIN — INSULIN LISPRO 5 UNITS: 100 INJECTION, SOLUTION INTRAVENOUS; SUBCUTANEOUS at 17:24

## 2023-07-03 RX ADMIN — SENNOSIDES 17.2 MG: 8.6 TABLET, FILM COATED ORAL at 22:26

## 2023-07-03 RX ADMIN — Medication 2000 UNITS: at 08:46

## 2023-07-03 RX ADMIN — TIMOLOL MALEATE 1 DROP: 5 SOLUTION/ DROPS OPHTHALMIC at 08:47

## 2023-07-03 RX ADMIN — POLYETHYLENE GLYCOL 3350 17 G: 17 POWDER, FOR SOLUTION ORAL at 08:47

## 2023-07-03 RX ADMIN — TORSEMIDE 40 MG: 20 TABLET ORAL at 08:46

## 2023-07-03 NOTE — PROGRESS NOTES
[facility] [facility]  Progress Note  Name: Elvis Garvin  MRN: [id number]  Unit/Bed#: [facility]PHP 402-01 I Date of Admission: 6/28/2023   Date of Service: 7/3/2023 I Hospital Day: 5    Assessment/Plan   * Pericardial effusion  Assessment & Plan  · Large acute on chronic pericardial effusion with tamponade physiology noted  · Sp percutaneous drainage   · Cardiology placed [facility] and pericardial drain on 6/30/2023  · Follow-up cultures   · TTE demonstrates improved effusion compared to prior  · PAC removed  · Plan to remove pericardial drain when there is less than 30 cc over 24 hours  · Currently holding Eliquis for now given recent procedure and extremely low prevent platelets  · Repeat transthoracic echo noting residual moderate sized fluid collection isolated to the cardiac apex with no overt evidence of tamponade. · Cardiology recommend repeat echo on Monday, diuresis given urine output. Pneumonia  Assessment & Plan  · Pneumonia C/F with right lower lobe empyema status post right chest tube by thoracic surgery  · Patient has completed 7-day cefepime course  · Remains afebrile, however slight increase    Anemia  Assessment & Plan  · Hemoglobin 11.0 present on admission  · Currently 8.7/26.4  · Platelet count with improvement at 97  · Continue to hold [facility] with discussed with thoracic when able to resume  · Patient with decreased from 8.7-7.9 today    Encephalopathy  Assessment & Plan  · Likely toxic/metabolic secondary to critical illness and delirium  · Continue delirium precautions  · Patient with difficulty sleeping  · Melatonin and mirtazapine were added and critical care    Urinary retention  Assessment & Plan  · Dupont placed for urinary retention, can consider void trial -patient will function  · Does have history of BPH, continue Flomax.    ·  Consider reaching out to urology for outpatient follow-up    Acute kidney injury superimposed on chronic kidney disease [facility])  Assessment & Plan  Lab Results   Component Value Date    EGFR 18 07/03/2023    EGFR 18 07/02/2023    EGFR 16 07/01/2023    CREATININE 3.00 (H) 07/03/2023    CREATININE 2.99 (H) 07/02/2023    CREATININE 3.29 (H) 07/01/2023   · Resolving, likely secondary cardiorenal  · Baseline creatinine 2.5-3  · Patient is status post pericardiocentesis and diuresis  · Critical care held diuresis 6/20/2023 when fluid shifts secondary to large pericardial drainage  · Repeat BMP in the morning  ·     Atrial fibrillation with slow ventricular response (HCC)  Assessment & Plan  · We will discuss with cardiology when safe to start Eliquis    Chronic combined systolic and diastolic congestive heart failure (HCC)  Assessment & Plan  Wt Readings from Last 3 Encounters:   07/03/23 67.6 kg (149 lb 0.5 oz)   06/28/23 74.5 kg (164 lb 3.9 oz)   05/11/23 66.7 kg (147 lb)     · Diuretics held on admission in the setting of shock and KARINA,  · Torsemide 40 mg daily restarted per cardiology  · I/O, daily weight, low-sodium diet with fluid restriction         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 ([address])  Assessment & Plan  Lab Results   Component Value Date    HGBA1C 8.3 (H) 02/23/2023       Recent Labs     07/02/23  1034 07/02/23  1606 07/02/23  2045 07/03/23  0529   POCGLU 321* 355* 357* 363*       Blood Sugar Average: Last 72 hrs:  · Added 10 units Lantus at at bedtime on 7/3/2023  · Added 5 units lispro 3 times daily with meals on 7/3/2023  · Continue sliding scale insulin algorithm 3  · chk 2 am blood sugar dt addition of new insulin to prevent hypoglycemia   · Plan for adjustment tomorrow  (P) [phone]           VTE Pharmacologic Prophylaxis:   High Risk (Score >/= 5) - Pharmacological DVT Prophylaxis Contraindicated. Sequential Compression Devices Ordered.   Secondary to low platelet count however now improving would consider however still with chest tube    Patient Centered Rounds: I performed bedside rounds with nursing staff today. Discussions with Specialists or Other Care Team Provider: Nursing    Education and Discussions with Family / Patient: Updated  (son) via phone. Total Time Spent on Date of Encounter in care of patient: 45 minutes This time was spent on one or more of the following: performing physical exam; counseling and coordination of care; obtaining or reviewing history; documenting in the medical record; reviewing/ordering tests, medications or procedures; communicating with other healthcare professionals and discussing with patient's family/caregivers. Current Length of Stay: 5 day(s)  Current Patient Status: Inpatient   Certification Statement: The patient will continue to require additional inpatient hospital stay due to Ongoing chest tube Dupont and drain  Discharge Plan: Anticipate discharge in >72 hrs to rehab facility. Code Status: Level 3 - DNAR and DNI    Subjective: pt is laying in chair. He does grunt when taking breaths (discussed with son who reports hes been doing this this admission and he is unsure why pt doing this- he states he has asked him and every time he gives a different answer) pt has short term memory loss. Son reports pt is very quiet has always been someone who doesn't talk a lot. He has no nausea to day and nodds that he is eating. Pt son reports pt not eating well. Objective:     Vitals:   Temp (24hrs), Av.7 °F (36.5 °C), Min:97 °F (36.1 °C), Max:98.3 °F (36.8 °C)    Temp:  [97 °F (36.1 °C)-98.3 °F (36.8 °C)] 97 °F (36.1 °C)  HR:  [] 94  Resp:  [12-19] 12  BP: (114-124)/(49-61) 115/56  SpO2:  [97 %-100 %] 100 %  Body mass index is 22.01 kg/m². Input and Output Summary (last 24 hours):      Intake/Output Summary (Last 24 hours) at 7/3/2023 2138  Last data filed at 7/3/2023 2000  Gross per 24 hour   Intake 1010 ml   Output 2555 ml   Net -1545 ml       Physical Exam:   Physical Exam  Constitutional:       General: He is not in acute distress. Appearance: He is ill-appearing. He is not toxic-appearing or diaphoretic. HENT:      Head: Normocephalic and atraumatic. Mouth/Throat:      Pharynx: No oropharyngeal exudate or posterior oropharyngeal erythema. Eyes:      General:         Right eye: No discharge. Left eye: No discharge. Cardiovascular:      Rate and Rhythm: Rhythm irregular. Heart sounds: No murmur heard. No friction rub. No gallop. Pulmonary:      Effort: No respiratory distress. Breath sounds: No stridor. Rhonchi and rales present. No wheezing. Comments: Midline epigastric region pigtail cath intact no erythema noted . Chest:      Chest wall: No tenderness. Abdominal:      General: There is no distension. Palpations: There is no mass. Tenderness: There is no abdominal tenderness. There is no guarding or rebound. Hernia: No hernia is present. Musculoskeletal:         General: No tenderness, deformity or signs of injury. Right lower leg: Edema present. Left lower leg: Edema present. Skin:     Coloration: Skin is not jaundiced or pale. Findings: Bruising present. No erythema, lesion or rash. Neurological:      Mental Status: He is alert. He is disoriented. Comments: Pt not very verbal (yes or no type responses) pt grunting (cant explain why he does this - son ?  Reason for this new since admission)    Psychiatric:      Comments: Depressed appearing           Additional Data:     Labs:  Results from last 7 days   Lab Units 07/03/23  0449   WBC Thousand/uL 12.60*   HEMOGLOBIN g/dL 7.9*   HEMATOCRIT % 22.9*   PLATELETS Thousands/uL 97*   NEUTROS PCT % 84*   LYMPHS PCT % 9*   MONOS PCT % 6   EOS PCT % 0     Results from last 7 days   Lab Units 07/03/23  0449 07/01/23  0429 06/29/23  0209   SODIUM mmol/L 134*   < > 135   POTASSIUM mmol/L 4.3   < > 4.8   CHLORIDE mmol/L 101   < > 104   CO2 mmol/L 28   < > 28   BUN mg/dL 116*   < > 71*   CREATININE mg/dL 3.00*   < > 2.75*   ANION GAP mmol/L 5   < > 3   CALCIUM mg/dL 8.1*   < > 8.3   ALBUMIN g/dL  --   --  2.3*   TOTAL BILIRUBIN mg/dL  --   --  0.86   ALK PHOS U/L  --   --  145*   ALT U/L  --   --  65   AST U/L  --   --  42   GLUCOSE RANDOM mg/dL 239*   < > 177*    < > = values in this interval not displayed. Results from last 7 days   Lab Units 07/03/23  1723 07/03/23  1206 07/03/23  0529 07/02/23  2045 07/02/23  1606 07/02/23  1034 07/02/23  0603 07/01/23  2104 07/01/23  1721 07/01/23  1637 07/01/23  1058 07/01/23  0753   POC GLUCOSE mg/dl 120 132 363* 357* 355* 321* 326* 297* 389* 400* 259* 194*         Results from last 7 days   Lab Units 07/01/23  1219   PROCALCITONIN ng/ml 0.26*       Lines/Drains:  Invasive Devices     Peripheral Intravenous Line  Duration           Long-Dwell Peripheral IV (Midline) 66/78/67 Right Basilic 9 days          Drain  Duration           Chest Tube Right 10.2 Fr. 9 days    Urethral Catheter 16 Fr. 5 days    Closed/Suction Drain Pericardial Other (Comment) 6 Fr. 3 days              Urinary Catheter:  Goal for removal: Voiding trial when ambulation improves           Telemetry:  Telemetry Orders (From admission, onward)             24 Hour Telemetry Monitoring  Continuous x 24 Hours (Telem)        Question:  Reason for 24 Hour Telemetry  Answer:  PCI/EP study (including pacer and ICD implementation), Cardiac surgery, MI, abnormal cardiac cath, and chest pain- rule out MI                 Telemetry Reviewed: Atrial fibrillation.  HR averaging 80  Indication for Continued Telemetry Use: Acute CHF on >200 mg lasix/day or equivalent dose or with new reduced EF.  pericardial effusion              Imaging: Reviewed radiology reports from this admission including: chest xray    Recent Cultures (last 7 days):   Results from last 7 days   Lab Units 06/30/23  1341   GRAM STAIN RESULT  No Polys or Bacteria seen   BODY FLUID CULTURE, STERILE  No growth       Last 24 Hours Medication List:   Current Facility-Administered Medications   Medication Dose Route Frequency Provider Last Rate   • ascorbic acid  500 mg Oral Daily Susan Ash PA-C     • atorvastatin  40 mg Oral Daily With [place]'s Pricarmella Ramirez PA-C     • cholecalciferol  2,000 Units Oral Daily Susan Ash PA-C     • docusate sodium  100 mg Oral BID Susan Ash PA-C     • insulin glargine  10 Units Subcutaneous HS Gertrude JonesSUSANA     • insulin lispro  1-5 Units Subcutaneous TID AC Gertrude JonesSUSANA     • insulin lispro  1-5 Units Subcutaneous HS Gertrude JonesSUSANA     • [START ON 7/4/2023] insulin lispro  1-5 Units Subcutaneous 0200 Gertrude JonesSUSANA     • insulin lispro  5 Units Subcutaneous TID With Meals Gertrude JonesSUSANA     • latanoprost  1 drop Both Eyes HS Susan Ash PA-C     • melatonin  6 mg Oral HS Jason Soto PA-C     • ondansetron  4 mg Intravenous Once SUSANA Keating     • polyethylene glycol  17 g Oral Daily Susan Ash PA-C     • senna  2 tablet Oral HS Susan Ash PA-C     • tamsulosin  0.4 mg Oral Daily With [employer], PA-C     • timolol  1 drop Both Eyes Daily Susan Ash PA-C     • torsemide  40 mg Oral Daily Susan Ash PA-C          Today, Patient Was Seen By: SUSANA Keating    **Please Note: This note may have been constructed using a voice recognition system. **

## 2023-07-03 NOTE — PLAN OF CARE
Problem: PHYSICAL THERAPY ADULT  Goal: Performs mobility at highest level of function for planned discharge setting. See evaluation for individualized goals. Description: Treatment/Interventions: LE strengthening/ROM, Therapeutic exercise, ADL retraining, Functional transfer training, Elevations, Endurance training, Cognitive reorientation, Patient/family training, Equipment eval/education, Bed mobility, Gait training, Spoke to nursing, OT (PT spoke to CM)  Equipment Recommended: [place]       See flowsheet documentation for full assessment, interventions and recommendations. 7/3/2023 1710 by Luis Hernandez  Note: Prognosis: Fair  Problem List: Decreased strength, Decreased endurance, Impaired balance, Decreased mobility, Decreased coordination, Decreased cognition, Decreased safety awareness, Pain, Impaired judgement  Assessment: Pt is an 79 y/o man admitted with symptoms of weakness, diarrhea, and septic shock. Pt was diagnosed with percardial effusion, empyema of lung, and pneumonia. Pt's comorbidities affecting POC include A fib, CKD, CAD, diabetes mellitus, and HTN. Pt's clinical presentation is unstable due to multiple lines, ongoing telemetry monitoring, and pmh of CKD and A Fib. Impairments include deficits in strength, endurance, and gait. Will follow pt to address above impairments. Recommend rehab upon D/C. Barriers to Discharge: None     PT Discharge Recommendation: Post acute rehabilitation services    See flowsheet documentation for full assessment.

## 2023-07-03 NOTE — RESTORATIVE TECHNICIAN NOTE
Restorative Technician Note      Patient Name: Leonor Diallo Tech Visit Date: 07/03/23  Note Type: Mobility  Patient Position Upon Consult: Bedside chair  Activity Performed: Stood  Assistive Device: Roller walker  Patient Position at [employer] of Consult: All needs within reach; Bed/Chair alarm activated;  Bedside chair

## 2023-07-03 NOTE — ASSESSMENT & PLAN NOTE
· Pneumonia C/F with right lower lobe empyema status post right chest tube by thoracic surgery  · Patient has completed 7-day cefepime course  · Remains afebrile, however slight increase

## 2023-07-03 NOTE — RESTORATIVE TECHNICIAN NOTE
Restorative Technician Note      Patient Name: Deborah Lu     Restorative Tech Visit Date: 07/03/23  Note Type: Mobility  Patient Position Upon Consult: Supine  Activity Performed: Transferred  Assistive Device: Other (Comment) (HHA x 1)  Patient Position at End of Consult: All needs within reach; Bed/Chair alarm activated;  Bedside chair

## 2023-07-03 NOTE — OCCUPATIONAL THERAPY NOTE
Occupational Therapy Evaluation     Patient Name: Hector Conde  ITHBO'W Date: 7/3/2023  Problem List  Principal Problem:    Pericardial effusion  Active Problems:    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 (HCC)    Chronic combined systolic and diastolic congestive heart failure (HCC)    Atrial fibrillation with slow ventricular response (HCC)    Acute kidney injury superimposed on chronic kidney disease ([address])    Anemia    Urinary retention    Pneumonia    Encephalopathy    Past Medical History  Past Medical History:   Diagnosis Date    Atrial fibrillation ([address])     Chronic kidney disease     Coronary artery disease     Diabetes mellitus ([address])     Hyperlipidemia     Hypertension      Past Surgical History  Past Surgical History:   Procedure Laterality Date    CARDIAC CATHETERIZATION N/A 6/30/2023    Procedure: Cardiac pericardiocentesis; Surgeon: Marcel Natarajan DO;  [facility]; Service: Cardiology    CARDIAC CATHETERIZATION N/A 6/30/2023    Procedure: Cardiac RHC; Surgeon: Marcel Natarajan DO;  [facility]; Service: Cardiology    EYE SURGERY      IR CHEST TUBE PLACEMENT  6/24/2023    SKIN CANCER EXCISION      TONSILLECTOMY             07/03/23 0920   OT Last Visit   OT Visit Date 07/03/23   Note Type   Note type Evaluation   Pain Assessment   Pain Assessment Tool 0-10   Pain Score No Pain   Restrictions/Precautions   Weight Bearing Precautions Per Order No   Other Precautions Chair Alarm;Cognitive;Multiple lines;Telemetry; Fall Risk  (chest tube)   Home Living   Type of [address] [place] [address] Road to live on main level with bedroom/bathroom; Two level;Performs ADLs on one level   Bathroom Shower/Tub Tub/shower unit   [address] chair   [address]   (denies)   Additional Comments Pt lives in a 2  but has first floor s/u with bedroom and bathroom.    Prior Function   Level of [place] Independent with ADLs; Independent with functional mobility   Lives With Son   Receives Help From Family   Vocational Retired   Lifestyle   Autonomy Pt is a (?) historian, but reports being I at baseline with ADLs and mobility. Reciprocal Relationships Son   Service to Others Retired   Intrinsic Gratification pt mostly sedentary   ADL   Where Assessed Chair   Grooming Assistance 4  Minimal Assistance   UB Bathing Assistance 4  Minimal Assistance    [address] 3  Moderate Assistance   UB Dressing Assistance 4  Minimal Assistance   LB [address] [address] [place] Final 3  Moderate [address] [place]  3  Moderate Assistance   Functional Assistance 3  Moderate Assistance   Bed Mobility   Additional Comments Pt OOB in [address] chair at start and end of session   Transfers   Sit to Stand 3  Moderate assistance   Additional items Assist x 2;Verbal cues   Stand to Sit 3  Moderate assistance   Additional items Assist x 2;Verbal cues   Stand pivot 3  Moderate assistance   Additional items Assist x 2;Verbal cues   Additional Comments Pt stood with RW and Ax2. He took steps away from and back to the chair   Balance   Static Sitting Fair   Dynamic Sitting Fair -   Static Standing Poor   Dynamic Standing Poor   Activity Tolerance   Activity Tolerance Patient limited by fatigue   Medical Staff Made Aware PTTaye   Nurse Made Aware RN cleared   RUE Assessment   RUE Assessment WFL   LUE Assessment   LUE Assessment WFL   Cognition   Overall Cognitive Status Impaired   Arousal/Participation Alert; Responsive; Cooperative   Attention Attends with cues to redirect   Orientation Level Oriented to person;Oriented to place; Disoriented to time;Disoriented to situation   Memory Decreased recall of precautions   Following Commands Follows one step commands with increased time or repetition   Comments Pt pleasant but overall confused with decreased safety awareness.    Assessment   Limitation Decreased ADL status; Decreased Safe judgement during ADL;Decreased cognition;Decreased endurance;Decreased self-care trans;Decreased high-level ADLs   Prognosis Good   Assessment Pt is a 78 y.o. male who was admitted to [facility] on 6/28/2023 with Pericardial effusion . Pt  has a past medical history of Atrial fibrillation ([address]), Chronic kidney disease, Coronary artery disease, Diabetes mellitus ([address]), Hyperlipidemia, and Hypertension. Pt w/ chest tubes in place. At baseline pt was I w/ ADLs and mobility. Pt lives with son in a home w/ first floor s/u. Currently pt requires min-mod A for overall ADLS and mod Ax2 for functional mobility/transfers w/ RW and vc. Pt currently presents with impairments in the following categories -limited home support, difficulty performing ADLS and difficulty performing IADLS  activity tolerance, endurance, standing balance/tolerance, memory and insight. These impairments, as well as pt's fatigue, decreased caregiver support and risk for falls  limit pt's ability to safely engage in all baseline areas of occupation, including grooming, bathing, dressing, toileting and functional mobility/transfers. The patient's raw score on the -PAC Daily Activity Inpatient Short Form is 16. A raw score of less than 19 suggests the patient may benefit from discharge to post-acute rehabilitation services. Please refer to the recommendation of the Occupational Therapist for safe discharge planning. From OT standpoint, recommend inpatient rehab upon D/C. OT will continue to follow to address the below stated goals. Goals   Patient Goals to feel better   LTG Time Frame 10-14   Long Term Goal see below goals   Plan   Treatment Interventions ADL retraining;Functional transfer training; Endurance training;Cognitive reorientation;Patient/family training;Equipment evaluation/education; Compensatory technique education; Energy conservation   Goal Expiration Date 07/17/23   OT Frequency 2-3x/wk   Recommendation OT Discharge Recommendation Post acute rehabilitation services   AM-PAC Daily Activity Inpatient   Lower Body Dressing 2   Bathing 2   Toileting 2   Upper Body Dressing 3   Grooming 3   Eating 4   Daily Activity Raw Score 16   Daily Activity Standardized Score (Calc for Raw Score >=11) 35.96     LTG (10-14 DAYS)  Pt will improve activity tolerance to G for min 30-45 min txment sessions to increase participation in ADLs    Pt will complete ADLs/self care w/ mod I using adaptive device and DME as needed    Pt will complete toileting w/ mod I w/ G hygiene/thoroughness using DME as needed    Pt will improve functional transfers on/off all surfaces to mod I using DME as needed w/ G balance/safety. Pt will improve functional mobility during ADL/IADL/leisure tasks to mod I using DME as needed w/ G balance/safety. Pt will improve standing balance to G for 8-10 minutes of purposeful activity w/ G endurance.     Pt will demonstrate G carryover of pt/caregiver education and training as appropriate w/ mod I w/o cues w/ good tolerance    Pt will demonstrate 100% carryover of energy conservation techniques w/ mod I t/o functional I/ADL/leisure tasks w/o cues s/p skilled education     Pt will engage in ongoing cognitive assessment (as needed) w/ G participation w/ mod I to A w/ safe d/c planning/recommendations        CHRISTOPHER Pedro/JOHN

## 2023-07-03 NOTE — ASSESSMENT & PLAN NOTE
· Large acute on chronic pericardial effusion with tamponade physiology noted  · Sp percutaneous drainage   · Cardiology placed [facility] and pericardial drain on 6/30/2023  · Follow-up cultures   · TTE demonstrates improved effusion compared to prior  · PAC removed  · Plan to remove pericardial drain when there is less than 30 cc over 24 hours  · Currently holding Eliquis for now given recent procedure and extremely low prevent platelets  · Repeat transthoracic echo noting residual moderate sized fluid collection isolated to the cardiac apex with no overt evidence of tamponade. · Cardiology recommend repeat echo on Monday, diuresis given urine output.

## 2023-07-03 NOTE — PHYSICAL THERAPY NOTE
Physical Therapy Evaluation     Patient's Name: Lauren Ventura    Admitting Diagnosis  Septic shock [facility])    Problem List  Patient Active Problem List   Diagnosis    Closed traumatic displaced fracture of distal end of left radius with malunion    PVC (premature ventricular contraction)    Essential hypertension    Leg edema    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 (HCC)    Pericardial effusion    Chronic combined systolic and diastolic congestive heart failure ([address])    Vitamin D deficiency    Cellulitis of left upper extremity    Atrial fibrillation with slow ventricular response (HCC)    COVID-19    Electrolyte abnormality    Acute kidney injury superimposed on chronic kidney disease ([address])    Benign hypertension with CKD (chronic kidney disease) stage IV (HCC)    Persistent proteinuria    Anemia    Chronic kidney disease-mineral and bone disorder    Advanced care planning/counseling discussion    Hyponatremia    Macrocytosis    Urinary retention    Septic shock (HCC)    Thrombocytopenia (HCC)    Diarrhea    Hypothermia    Pneumonia    Hypoglycemia    Empyema of lung (HCC)    Encephalopathy       Past Medical History  Past Medical History:   Diagnosis Date    Atrial fibrillation ([address])     Chronic kidney disease     Coronary artery disease     Diabetes mellitus ([address])     Hyperlipidemia     Hypertension        Past Surgical History  Past Surgical History:   Procedure Laterality Date    CARDIAC CATHETERIZATION N/A 6/30/2023    Procedure: Cardiac pericardiocentesis; Surgeon: Damaris Fried DO;  [facility]; Service: Cardiology    CARDIAC CATHETERIZATION N/A 6/30/2023    Procedure: Cardiac RHC; Surgeon: Damaris Fried DO;  [facility];   Service: Cardiology    EYE SURGERY      IR CHEST TUBE PLACEMENT  6/24/2023    SKIN CANCER EXCISION      TONSILLECTOMY            07/03/23 0921   PT Last Visit   PT Visit Date 07/03/23   Note Type Note type Evaluation   End of Consult   Patient Position at End of Consult Bedside chair; All needs within reach;Bed/Chair alarm activated   Pain Assessment   Pain Assessment Tool FLACC   Pain Rating: FLACC (Rest) - Face 1   Pain Rating: FLACC (Rest) - Legs 0   Pain Rating: FLACC (Rest) - Activity 0   Pain Rating: FLACC (Rest) - Cry 1   Pain Rating: FLACC (Rest) - Consolability 0   Score: FLACC (Rest) 2   Pain Rating: FLACC (Activity) - Face 1   Pain Rating: FLACC (Activity) - Legs 0   Pain Rating: FLACC (Activity) - Activity 0   Pain Rating: FLACC (Activity) - Cry 1   Pain Rating: FLACC (Activity) - Consolability 0   Score: FLACC (Activity) 2   Restrictions/Precautions   Weight Bearing Precautions Per Order No   Other Precautions Cardiac/sternal;Chair Alarm;Multiple lines;Telemetry; Fall Risk;Cognitive  (Simultaneous filing. User may not have seen previous data.)   Home Living   Type of [address] [facility] to live on main level with bedroom/bathroom; Two level   Additional Comments No MOHINDER   Prior Function   Level of [place] Independent with functional mobility  (Ambulates with RW)   Lives With Son   Receives Help From [facility] Retired   Mable's   Additional Pertinent History Cleared for assessment by nursing   Family/Caregiver Present No   Cognition   Overall Cognitive Status Impaired   Arousal/Participation Responsive   Attention Attends with cues to redirect   Orientation Level Oriented to person;Oriented to place; Disoriented to time;Disoriented to situation   Memory Decreased recall of precautions;Decreased recall of recent events;Decreased short term memory   Following Commands Follows one step commands with increased time or repetition   Comments Pt appears to be disoriented to month and season. Subjective   Subjective Pt agreeable and consents to mobilization.    RUE Assessment   RUE Assessment WFL  (AROM)   LUE Assessment   LUE Assessment WFL  (AROM)   RLE Assessment   RLE Assessment WFL  (AROM)   Strength RLE   RLE Overall Strength 3/5   LLE Assessment   LLE Assessment WFL  (AROM)   Strength LLE   LLE Overall Strength 3/5  (Fair)   Bed Mobility   Additional Comments Pt in bedside chair upon arrival   Transfers   Sit to Stand 3  Moderate assistance   Additional items Assist x 2; Increased time required;Verbal cues   Stand to Sit 3  Moderate assistance   Additional items Assist x 2; Increased time required;Verbal cues   Additional Comments Pt ambulated forward and backward with RW mod A x2   Ambulation/Elevation   Gait pattern [place]; Decreased foot clearance;Shuffling; Foward flexed; Short stride; Excessively slow   Gait Assistance 3  Moderate assist   Additional items Assist x 2;Verbal cues   Assistive Device Rolling walker   Distance 5'   Balance   Static Sitting Fair   Dynamic Sitting Fair -   Static Standing Poor   Dynamic Standing Poor   Ambulatory Poor -   Endurance Deficit   Endurance Deficit Yes   Activity Tolerance   Activity Tolerance Patient limited by fatigue   Medical Staff Made Aware Taye Connor OT PT   Nurse Made Aware Spoke to RN   Assessment   Prognosis Fair   Problem List Decreased strength;Decreased endurance; Impaired balance;Decreased mobility; Decreased coordination;Decreased cognition;Decreased safety awareness;Pain; Impaired judgement   Assessment Pt is an 77 y/o man admitted with symptoms of weakness, diarrhea, and septic shock. Pt was diagnosed with percardial effusion, empyema of lung, and pneumonia. Pt's comorbidities affecting POC include A fib, CKD, CAD, diabetes mellitus, and HTN. Pt's clinical presentation is unstable due to multiple lines, ongoing telemetry monitoring, and pmh of CKD and A Fib. Impairments include deficits in strength, endurance, and gait. Will follow pt to address above impairments. Recommend rehab upon D/C. (Simultaneous filing.  User may not have seen previous data.)   Barriers to Discharge None   Goals   Patient Goals To get better STG Expiration Date 07/13/23   Short Term Goal #1 7-10 Days: Pt will be able to ambulate 50' supervision with RW to improve home mobility. Pt will be able to perform bed mobility with min A x1 to decrease caregiver burden and progress to OOB mobility. Pt will be able to perform transfers min A x1 to decrease caregiver burden and allow pt to progress to OOB mobility. (Simultaneous filing. User may not have seen previous data.)   PT Treatment Day 0   Plan   Treatment/Interventions LE strengthening/ROM; Therapeutic exercise;ADL retraining;Functional transfer training;Elevations; Endurance training;Cognitive reorientation;Patient/family training;Equipment eval/education; Bed mobility;Gait training;Spoke to nursing;OT  (PT spoke to [facility])   PT Frequency 3-5x/wk   Recommendation   PT Discharge Recommendation Post acute rehabilitation services   Equipment Recommended [facility] Recommended Wheeled walker   Change/add to [place] Panels? No   AM-PAC Basic Mobility Inpatient   Turning in Flat Bed Without Bedrails 3   Lying on Back to Sitting on Edge of Flat Bed Without Bedrails 3   Moving Bed to Chair 2   Standing Up From Chair Using Arms 2   Walk in Room 2   Climb 3-5 Stairs With Railing 2   Basic Mobility Inpatient Raw Score 14   Basic Mobility Standardized Score 35.55   Highest Level Of Mobility   -HLM Goal 4: Move to chair/commode   -HLM Achieved 5: Stand (1 or more minutes)   Modified Catrachita Scale   Modified Catrachita Scale 4   End of Consult   Patient Position at End of Consult Bed/Chair alarm activated; All needs within reach; Bedside chair         [facility]

## 2023-07-03 NOTE — ASSESSMENT & PLAN NOTE
Lab Results   Component Value Date    EGFR 18 07/03/2023    EGFR 18 07/02/2023    EGFR 16 07/01/2023    CREATININE 3.00 (H) 07/03/2023    CREATININE 2.99 (H) 07/02/2023    CREATININE 3.29 (H) 07/01/2023   · Resolving, likely secondary cardiorenal  · Baseline creatinine 2.5-3  · Patient is status post pericardiocentesis and diuresis  · Critical care held diuresis 6/20/2023 when fluid shifts secondary to large pericardial drainage  · Repeat BMP in the morning  ·

## 2023-07-03 NOTE — ASSESSMENT & PLAN NOTE
Lab Results   Component Value Date    HGBA1C 8.3 (H) 02/23/2023       Recent Labs     07/02/23  1034 07/02/23  1606 07/02/23 2045 07/03/23  0529   POCGLU 321* 355* 357* 363*       Blood Sugar Average: Last 72 hrs:  · Added 10 units Lantus at at bedtime on 7/3/2023  · Added 5 units lispro 3 times daily with meals on 7/3/2023  · Continue sliding scale insulin algorithm 3  · chk 2 am blood sugar dt addition of new insulin to prevent hypoglycemia   · Plan for adjustment tomorrow  [phone]

## 2023-07-03 NOTE — PROGRESS NOTES
Cardiology Progress note. Unit/Bed#: [facility] 402-01 Encounter: [id number]        Lyanne Lefort 78 y.o. male [id number]  Hospital Stay Days: 5    Assessment and Plan      Current Problem List   Principal Problem:    Pericardial effusion  Active Problems:    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 (HCC)    Chronic combined systolic and diastolic congestive heart failure (HCC)    Atrial fibrillation with slow ventricular response (HCC)    Acute kidney injury superimposed on chronic kidney disease (HCC)    Anemia    Urinary retention    Pneumonia    Encephalopathy    Assessment/Plan: This is a 53-year-old male with past medical history of heart failure preserved EF 45%, CKD, type 2 diabetes, atrial fibrillation on Eliquis who initially presented with complicated pneumonia with both pleural and pericardial effusion. Status post chest tube and antibiotics for pleural effusion. S/p pericardiocentesis with significant resolution of his pericardial fusion. Still has loculated pericardial effusion at the apex. # Acute on chronic pericardial fusion with tamponade s/p pericardiocentesis  # Lung empyema status post chest tube placement this admission  # Complicated pneumonia s/p antibiotics this admission  # Atrial fibrillation  # KARINA on CKD  # Type 2 diabetes  # HFpEF 60%  # CAD    Plan:    · Patient still has persistent drainage from his pericardial drain although has significantly improved overnight. · We will keep pericardial drain in place for now, consider removing when output is less than 25-30cc per 24hrs  · Repeat echo 7/3 shows small loculated pericardial effusion at apex  · Continue to monitor pericardial drain ins and outs  · Continue to hold home Eliquis for history of A-fib. · Continue home torsemide 40 mg daily since patient still volume overloaded. · Rest of care per primary team.     Subjective     Pt admits to feeling well.  Denies any chest pain or shortness of breath, palpitations or abdominal pain. Objective     Vitals: Temp (24hrs), Av °F (36.7 °C), Min:97.6 °F (36.4 °C), Max:99 °F (37.2 °C)  Current: Temperature: 97.6 °F (36.4 °C)  Patient Vitals for the past 24 hrs:   BP Temp Temp src Pulse Resp SpO2 Height Weight   23 0749 122/55 -- -- 94 19 98 % -- 67.6 kg (149 lb 0.5 oz)   23 0705 124/61 -- -- 96 -- -- 5' 9" (1.753 m) 66.7 kg (147 lb)   23 0600 -- -- -- -- -- -- -- 67 kg (147 lb 11.3 oz)   23 0338 124/61 97.6 °F (36.4 °C) Oral 100 18 98 % -- --   23 2201 (!) 114/49 98.1 °F (36.7 °C) -- 94 -- 97 % -- --   23 2100 -- -- -- -- -- 98 % -- --   23 1846 (!) 114/48 97.6 °F (36.4 °C) -- 89 -- 99 % -- --   23 1459 (!) 113/49 99 °F (37.2 °C) -- 96 -- 98 % -- --   23 1036 114/50 97.9 °F (36.6 °C) -- 90 17 98 % -- --    Body mass index is 22.01 kg/m². Physical Exam:  Physical Exam  Constitutional:       General: He is not in acute distress. Appearance: He is well-developed. He is not diaphoretic. HENT:      Head: Normocephalic and atraumatic. Nose: Nose normal.      Mouth/Throat:      Pharynx: No oropharyngeal exudate. Eyes:      General: No scleral icterus. Right eye: No discharge. Left eye: No discharge. Cardiovascular:      Rate and Rhythm: Normal rate. Rhythm irregular. Heart sounds: Normal heart sounds. No murmur heard. No friction rub. No gallop. Pulmonary:      Effort: Pulmonary effort is normal. No respiratory distress. Breath sounds: No stridor. No wheezing or rales. Abdominal:      General: Bowel sounds are normal. There is no distension. Palpations: Abdomen is soft. Tenderness: There is no abdominal tenderness. There is no guarding. Musculoskeletal:         General: No swelling or deformity. Normal range of motion. Cervical back: Normal range of motion and neck supple. Right lower leg: Edema present.       Left lower leg: Edema present. Comments: 2+ bilateral pitting edema of lower extremties   Skin:     General: Skin is warm. Coloration: Skin is not jaundiced. Findings: No bruising, erythema or lesion. Neurological:      Mental Status: He is alert and oriented to person, place, and time. Invasive Devices     Peripheral Intravenous Line  Duration           Long-Dwell Peripheral IV (Midline) 50/50/33 Right Basilic 8 days          Drain  Duration           Chest Tube Right 10.2 Fr. 8 days    Urethral Catheter 16 Fr. 5 days    Closed/Suction Drain Pericardial Other (Comment) 6 Fr. 2 days                    Labs:   Results from last 7 days   Lab Units 07/03/23 0449 07/02/23 0423 07/01/23  0429 06/29/23  0209 06/28/23  1155 06/28/23  1155 06/27/23  0539   WBC Thousand/uL 12.60* 12.56* 9.88 7.83  --  6.50 5.08   HEMOGLOBIN g/dL 7.9* 8.7* 9.5* 11.4*  --  11.0* 9.7*   HEMATOCRIT % 22.9* 26.4* 29.0* 34.6*  --  32.9* 29.4*   PLATELETS Thousands/uL 97* 81* 49* 55*  --  49* 48*   NEUTROS PCT % 84* 86* 86* 69   < > 73  --    MONOS PCT % 6 6 6 12  --  12  --    EOS PCT % 0 0 0 1  --  1  --     < > = values in this interval not displayed.       Results from last 7 days   Lab Units 07/03/23 0449 07/02/23 0423 07/01/23  1219 07/01/23 0429 06/29/23  0209 06/28/23  1155 06/27/23  0539   SODIUM mmol/L 134* 134* 135 137 135 132* 135   POTASSIUM mmol/L 4.3 4.8 4.7 4.8 4.8 5.2 4.5   CHLORIDE mmol/L 101 101 101 105 104 102 105   CO2 mmol/L 28 29 24 22 28 25 23   BUN mg/dL 116* 92* 80* 71* 71* 65* 76*   CREATININE mg/dL 3.00* 2.99* 3.29* 2.69* 2.75* 2.75* 3.05*   CALCIUM mg/dL 8.1* 8.4 8.8 8.2* 8.3 7.7* 7.3*   ALK PHOS U/L  --   --   --   --  145* 124*  --    ALT U/L  --   --   --   --  65 54*  --    AST U/L  --   --   --   --  42 38  --    MAGNESIUM mg/dL  --  2.4 2.3 2.2  --   --  2.1   PHOSPHORUS mg/dL  --  3.5 4.2* 4.4*  --   --  3.5   EGFR ml/min/1.73sq m 18 18 16 20 20 20 17                 No results found for: "PHART", "GST1BVL", "PO2ART", "BLN2DAU", "H4MJGQPF", "BEART", "SOURCE"  No components found for: "HIV1X2"  No results found for: "HAV", "HEPAIGM", "HEPBIGM", "HEPBCAB", "HBEAG", "HEPCAB"  Lab Results   Component Value Date    SPEP See Comment 02/27/2023    UPEP See Comment 02/26/2023      Lab Results   Component Value Date    HGBA1C 8.3 (H) 02/23/2023    HGBA1C 8.5 10/18/2022    HGBA1C 7.1 02/04/2021    HGBA1C 7.1 02/04/2021     No results found for: "CHOL"   Lab Results   Component Value Date    HDL 37 (A) 08/20/2021    HDL 34 (L) 01/15/2019      Lab Results   Component Value Date    LDLCALC 54 01/15/2019      Lab Results   Component Value Date    TRIG 63 08/20/2021    TRIG 70 01/15/2019     No components found for: "PROCAL"      Micro:  Results from last 7 days   Lab Units 06/30/23  1341   GRAM STAIN RESULT  No Polys or Bacteria seen   BODY FLUID CULTURE, STERILE  No growth     Urinalysis:  No results found for: "AMPHETUR", "BDZUR", "COCAINEUR", "OPIATEUR", "PCPUR", "Raghavendra Creed", "ETOH", "ACTMNPHEN", "SALICYLATE"       Invalid input(s): "URIBILINOGEN"        Intake and Outputs:  I/O       06/30 0701 07/01 0700 07/01 0701 07/02 0700 07/02 0701  07/03 0700    P. O. 480 1620 354    I.V. (mL/kg)  30 (0.5)     NG/GT  30     IV Piggyback 480 100     Total Intake(mL/kg) 960 (13.7) 1780 (26.7) 354 (5.3)    Urine (mL/kg/hr) 1138 (0.7) 1010 (0.6) 300 (1)    Drains 860 300     Stool  0 0    Chest Tube 220 180 0    Total Output 2218 1490 300    Net -1258 +290 +54           Unmeasured Stool Occurrence  1 x 1 x        Nutrition:  Diet Cardiovascular; Sodium 2 GM; Fluid Restriction 1500 ML, Consistent Carbohydrate Diet Level 1 (4 carb servings/60 grams CHO/meal)  Radiology Results:   XR chest portable ICU   Final Result by Ilya Daniel MD (07/01 1148)      Pulmonary artery catheter in main pulmonary artery. Persistent enlargement of the cardiac silhouette with pericardial drain.       Right pleural pigtail catheter with stable small loculated right hydropneumothorax and mild bibasilar atelectasis. Workstation performed: [id number]         XR chest portable ICU   Final Result by Marine Koyanagi, MD (07/01 1611)      Persistent small loculated right hydropneumothorax with moderate left effusion and bibasilar atelectasis. Pulmonary artery catheter in right interlobar pulmonary artery, subsequently retracted. Workstation performed: [id number]         XR chest portable ICU   Final Result by Marine Koyanagi, MD (07/02 1908)      Pericardial drain with slight decrease in marked enlargement of cardiac silhouette from pericardial effusion. Persistent small loculated right basilar hydropneumothorax and small left effusion. Persistent right base atelectasis.                Workstation performed: [id number]           Scheduled Medications:  ascorbic acid, 500 mg, Daily  atorvastatin, 40 mg, Daily With Dinner  cholecalciferol, 2,000 Units, Daily  docusate sodium, 100 mg, BID  insulin glargine, 10 Units, HS  insulin lispro, 1-5 Units, TID AC  insulin lispro, 1-5 Units, HS  insulin lispro, 5 Units, TID With Meals  latanoprost, 1 drop, HS  melatonin, 6 mg, HS  ondansetron, 4 mg, Once  polyethylene glycol, 17 g, Daily  senna, 2 tablet, HS  tamsulosin, 0.4 mg, Daily With Dinner  timolol, 1 drop, Daily  torsemide, 40 mg, Daily      PRN MEDS:     Last 24 Hour Meds: :   Medication Administration - last 24 hours from 07/02/2023 0936 to 07/03/2023 4214       Date/Time Order Dose Route Action Action by     07/03/2023 0846 EDT ascorbic acid (VITAMIN C) tablet 500 mg 500 mg Oral Given Joon Sagastume RN     07/02/2023 1750 EDT atorvastatin (LIPITOR) tablet 40 mg 40 mg Oral Given Dulce Maria Bar RN     07/03/2023 0132 EDT cholecalciferol (VITAMIN D3) tablet 2,000 Units 2,000 Units Oral Given Joon Sagastume RN     07/03/2023 1132 EDT docusate sodium (COLACE) capsule 100 mg 100 mg Oral Given Joon Sagastume RN     07/02/2023 1751 EDT docusate sodium (COLACE) capsule 100 mg 100 mg Oral Not Given Warwick Canpily, RN     07/02/2023 2139 EDT latanoprost (XALATAN) 0.005 % ophthalmic solution 1 drop 1 drop Both Eyes Given Ezequiel Pipo     07/03/2023 0847 EDT polyethylene glycol (MIRALAX) packet 17 g 17 g Oral Given Daniel Panda, RN     07/02/2023 2139 EDT senna (SENOKOT) tablet 17.2 mg 17.2 mg Oral Not Given Ezequiel Pipo     07/02/2023 1750 EDT tamsulosin (FLOMAX) capsule 0.4 mg 0.4 mg Oral Given Marybeth Whitfield, RN     07/03/2023 0525 EDT timolol (TIMOPTIC) 0.5 % ophthalmic solution 1 drop 1 drop Both Eyes Given Daniel Panda, RN     07/03/2023 4266 EDT insulin lispro (HumaLOG) 100 units/mL subcutaneous injection 1-5 Units 4 Units Subcutaneous Given Ezequiel Pipo     07/02/2023 1751 EDT insulin lispro (HumaLOG) 100 units/mL subcutaneous injection 1-5 Units 4 Units Subcutaneous Given Warwick Canpily, RN     07/02/2023 1113 EDT insulin lispro (HumaLOG) 100 units/mL subcutaneous injection 1-5 Units 3 Units Subcutaneous Given Ayaka Nicolle, RN     07/02/2023 2139 EDT insulin lispro (HumaLOG) 100 units/mL subcutaneous injection 1-5 Units 4 Units Subcutaneous Given Ezequiel Pipo     07/03/2023 0846 EDT torsemide (DEMADEX) tablet 40 mg 40 mg Oral Given Daniel Panda, RN     07/02/2023 1703 EDT ondansetron (ZOFRAN) injection 4 mg 4 mg Intravenous Not Given Warwick Canes, RN     07/03/2023 0114 EDT melatonin tablet 6 mg 6 mg Oral Given Bibi Bass     07/03/2023 0847 EDT insulin lispro (HumaLOG) 100 units/mL subcutaneous injection 1-5 Units 4 Units Subcutaneous Given Daniel Panda, RN     07/03/2023 0850 EDT insulin lispro (HumaLOG) 100 units/mL subcutaneous injection 5 Units 5 Units Subcutaneous Given Daniel Panda, RN          PLEASE NOTE:  This encounter was completed utilizing the M- Modal/Fluency Direct Speech Voice Recognition Software.  Grammatical errors, random word insertions, pronoun errors and incomplete sentences are occasional consequences of the system due to software limitations, ambient noise and hardware issues. These may be missed by proof reading prior to affixing electronic signature. Any questions or concerns about the content, text or information contained within the body of this dictation should be directly addressed to the physician for clarification. Please do not hesitate to call me directly if you have any any questions or concerns.

## 2023-07-03 NOTE — ASSESSMENT & PLAN NOTE
· Dupont placed for urinary retention, can consider void trial -patient will function  · Does have history of BPH, continue Flomax.    ·  Consider reaching out to urology for outpatient follow-up

## 2023-07-03 NOTE — ASSESSMENT & PLAN NOTE
Wt Readings from Last 3 Encounters:   07/03/23 67.6 kg (149 lb 0.5 oz)   06/28/23 74.5 kg (164 lb 3.9 oz)   05/11/23 66.7 kg (147 lb)     · Diuretics held on admission in the setting of shock and KARINA,  · Torsemide 40 mg daily restarted per cardiology  · I/O, daily weight, low-sodium diet with fluid restriction

## 2023-07-03 NOTE — PLAN OF CARE
Problem: OCCUPATIONAL THERAPY ADULT  Goal: Performs self-care activities at highest level of function for planned discharge setting. See evaluation for individualized goals. Description: Treatment Interventions: ADL retraining, Functional transfer training, Endurance training, Cognitive reorientation, Patient/family training, Equipment evaluation/education, Compensatory technique education, Energy conservation          See flowsheet documentation for full assessment, interventions and recommendations. Note: Limitation: Decreased ADL status, Decreased Safe judgement during ADL, Decreased cognition, Decreased endurance, Decreased self-care trans, Decreased high-level ADLs  Prognosis: Good  Assessment: Pt is a 78 y.o. male who was admitted to [address] [place] [address] on 6/28/2023 with Pericardial effusion . Pt  has a past medical history of Atrial fibrillation ([address] [place]), Chronic kidney disease, Coronary artery disease, Diabetes mellitus ([address]), Hyperlipidemia, and Hypertension. Pt w/ chest tubes in place. At baseline pt was I w/ ADLs and mobility. Pt lives with son in a home w/ first floor s/u. Currently pt requires min-mod A for overall ADLS and mod Ax2 for functional mobility/transfers w/ RW and vc. Pt currently presents with impairments in the following categories -limited home support, difficulty performing ADLS and difficulty performing IADLS  activity tolerance, endurance, standing balance/tolerance, memory and insight. These impairments, as well as pt's fatigue, decreased caregiver support and risk for falls  limit pt's ability to safely engage in all baseline areas of occupation, including grooming, bathing, dressing, toileting and functional mobility/transfers. The patient's raw score on the -PAC Daily Activity Inpatient Short Form is 16. A raw score of less than 19 suggests the patient may benefit from discharge to post-acute rehabilitation services.  Please refer to the recommendation of the Occupational Therapist for safe discharge planning. From OT standpoint, recommend inpatient rehab upon D/C. OT will continue to follow to address the below stated goals.      OT Discharge Recommendation: Post acute rehabilitation services

## 2023-07-03 NOTE — ASSESSMENT & PLAN NOTE
· Hemoglobin 11.0 present on admission  · Currently 8.7/26.4  · Platelet count with improvement at 97  · [facility] with discussed with thoracic when able to resume  · Patient with decreased from 8.7-7.9 today

## 2023-07-04 LAB
ANION GAP SERPL CALCULATED.3IONS-SCNC: 6 MMOL/L
BASOPHILS # BLD AUTO: 0.01 THOUSANDS/ÂΜL (ref 0–0.1)
BASOPHILS NFR BLD AUTO: 0 % (ref 0–1)
BUN SERPL-MCNC: 109 MG/DL (ref 5–25)
CALCIUM SERPL-MCNC: 8.4 MG/DL (ref 8.3–10.1)
CHLORIDE SERPL-SCNC: 103 MMOL/L (ref 96–108)
CO2 SERPL-SCNC: 28 MMOL/L (ref 21–32)
CREAT SERPL-MCNC: 2.72 MG/DL (ref 0.6–1.3)
EOSINOPHIL # BLD AUTO: 0.09 THOUSAND/ÂΜL (ref 0–0.61)
EOSINOPHIL NFR BLD AUTO: 1 % (ref 0–6)
ERYTHROCYTE [DISTWIDTH] IN BLOOD BY AUTOMATED COUNT: 15.2 % (ref 11.6–15.1)
GFR SERPL CREATININE-BSD FRML MDRD: 20 ML/MIN/1.73SQ M
GLUCOSE SERPL-MCNC: 155 MG/DL (ref 65–140)
GLUCOSE SERPL-MCNC: 186 MG/DL (ref 65–140)
GLUCOSE SERPL-MCNC: 230 MG/DL (ref 65–140)
GLUCOSE SERPL-MCNC: 248 MG/DL (ref 65–140)
GLUCOSE SERPL-MCNC: 43 MG/DL (ref 65–140)
GLUCOSE SERPL-MCNC: 43 MG/DL (ref 65–140)
GLUCOSE SERPL-MCNC: 92 MG/DL (ref 65–140)
HCT VFR BLD AUTO: 23.5 % (ref 36.5–49.3)
HGB BLD-MCNC: 7.9 G/DL (ref 12–17)
IMM GRANULOCYTES # BLD AUTO: 0.08 THOUSAND/UL (ref 0–0.2)
IMM GRANULOCYTES NFR BLD AUTO: 1 % (ref 0–2)
LYMPHOCYTES # BLD AUTO: 1.41 THOUSANDS/ÂΜL (ref 0.6–4.47)
LYMPHOCYTES NFR BLD AUTO: 13 % (ref 14–44)
MCH RBC QN AUTO: 34.3 PG (ref 26.8–34.3)
MCHC RBC AUTO-ENTMCNC: 33.6 G/DL (ref 31.4–37.4)
MCV RBC AUTO: 102 FL (ref 82–98)
MONOCYTES # BLD AUTO: 0.81 THOUSAND/ÂΜL (ref 0.17–1.22)
MONOCYTES NFR BLD AUTO: 7 % (ref 4–12)
NEUTROPHILS # BLD AUTO: 8.82 THOUSANDS/ÂΜL (ref 1.85–7.62)
NEUTS SEG NFR BLD AUTO: 78 % (ref 43–75)
NRBC BLD AUTO-RTO: 0 /100 WBCS
PLATELET # BLD AUTO: 126 THOUSANDS/UL (ref 149–390)
PMV BLD AUTO: 9.9 FL (ref 8.9–12.7)
POTASSIUM SERPL-SCNC: 4 MMOL/L (ref 3.5–5.3)
RBC # BLD AUTO: 2.3 MILLION/UL (ref 3.88–5.62)
SODIUM SERPL-SCNC: 137 MMOL/L (ref 135–147)
WBC # BLD AUTO: 11.22 THOUSAND/UL (ref 4.31–10.16)

## 2023-07-04 PROCEDURE — 99232 SBSQ HOSP IP/OBS MODERATE 35: CPT | Performed by: INTERNAL MEDICINE

## 2023-07-04 PROCEDURE — 82948 REAGENT STRIP/BLOOD GLUCOSE: CPT

## 2023-07-04 PROCEDURE — 85025 COMPLETE CBC W/AUTO DIFF WBC: CPT

## 2023-07-04 PROCEDURE — 80048 BASIC METABOLIC PNL TOTAL CA: CPT

## 2023-07-04 RX ORDER — INSULIN GLARGINE 100 [IU]/ML
7 INJECTION, SOLUTION SUBCUTANEOUS
Status: DISCONTINUED | OUTPATIENT
Start: 2023-07-04 | End: 2023-07-04

## 2023-07-04 RX ORDER — HEPARIN SODIUM 5000 [USP'U]/ML
5000 INJECTION, SOLUTION INTRAVENOUS; SUBCUTANEOUS EVERY 8 HOURS SCHEDULED
Status: DISCONTINUED | OUTPATIENT
Start: 2023-07-04 | End: 2023-07-08

## 2023-07-04 RX ORDER — INSULIN LISPRO 100 [IU]/ML
5 INJECTION, SOLUTION INTRAVENOUS; SUBCUTANEOUS
Status: DISCONTINUED | OUTPATIENT
Start: 2023-07-04 | End: 2023-07-11 | Stop reason: HOSPADM

## 2023-07-04 RX ORDER — INSULIN GLARGINE 100 [IU]/ML
5 INJECTION, SOLUTION SUBCUTANEOUS
Status: DISCONTINUED | OUTPATIENT
Start: 2023-07-04 | End: 2023-07-11 | Stop reason: HOSPADM

## 2023-07-04 RX ORDER — DEXTROSE MONOHYDRATE 25 G/50ML
25 INJECTION, SOLUTION INTRAVENOUS ONCE
Status: COMPLETED | OUTPATIENT
Start: 2023-07-04 | End: 2023-07-04

## 2023-07-04 RX ORDER — INSULIN LISPRO 100 [IU]/ML
3 INJECTION, SOLUTION INTRAVENOUS; SUBCUTANEOUS
Status: DISCONTINUED | OUTPATIENT
Start: 2023-07-04 | End: 2023-07-04

## 2023-07-04 RX ADMIN — INSULIN LISPRO 3 UNITS: 100 INJECTION, SOLUTION INTRAVENOUS; SUBCUTANEOUS at 11:18

## 2023-07-04 RX ADMIN — LATANOPROST 1 DROP: 50 SOLUTION OPHTHALMIC at 22:28

## 2023-07-04 RX ADMIN — MELATONIN TAB 3 MG 6 MG: 3 TAB at 22:27

## 2023-07-04 RX ADMIN — ATORVASTATIN CALCIUM 40 MG: 40 TABLET, FILM COATED ORAL at 16:46

## 2023-07-04 RX ADMIN — TORSEMIDE 40 MG: 20 TABLET ORAL at 09:09

## 2023-07-04 RX ADMIN — INSULIN LISPRO 5 UNITS: 100 INJECTION, SOLUTION INTRAVENOUS; SUBCUTANEOUS at 16:46

## 2023-07-04 RX ADMIN — INSULIN LISPRO 2 UNITS: 100 INJECTION, SOLUTION INTRAVENOUS; SUBCUTANEOUS at 11:18

## 2023-07-04 RX ADMIN — HEPARIN SODIUM 5000 UNITS: 5000 INJECTION INTRAVENOUS; SUBCUTANEOUS at 15:10

## 2023-07-04 RX ADMIN — OXYCODONE HYDROCHLORIDE AND ACETAMINOPHEN 500 MG: 500 TABLET ORAL at 09:09

## 2023-07-04 RX ADMIN — TAMSULOSIN HYDROCHLORIDE 0.4 MG: 0.4 CAPSULE ORAL at 16:46

## 2023-07-04 RX ADMIN — INSULIN GLARGINE 5 UNITS: 100 INJECTION, SOLUTION SUBCUTANEOUS at 22:27

## 2023-07-04 RX ADMIN — TIMOLOL MALEATE 1 DROP: 5 SOLUTION/ DROPS OPHTHALMIC at 09:13

## 2023-07-04 RX ADMIN — INSULIN LISPRO 1 UNITS: 100 INJECTION, SOLUTION INTRAVENOUS; SUBCUTANEOUS at 16:46

## 2023-07-04 RX ADMIN — INSULIN LISPRO 2 UNITS: 100 INJECTION, SOLUTION INTRAVENOUS; SUBCUTANEOUS at 22:27

## 2023-07-04 RX ADMIN — Medication 2000 UNITS: at 09:10

## 2023-07-04 RX ADMIN — HEPARIN SODIUM 5000 UNITS: 5000 INJECTION INTRAVENOUS; SUBCUTANEOUS at 22:27

## 2023-07-04 RX ADMIN — DEXTROSE MONOHYDRATE 25 ML: 25 INJECTION, SOLUTION INTRAVENOUS at 07:08

## 2023-07-04 NOTE — ASSESSMENT & PLAN NOTE
Lab Results   Component Value Date    EGFR 20 07/04/2023    EGFR 18 07/03/2023    EGFR 18 07/02/2023    CREATININE 2.72 (H) 07/04/2023    CREATININE 3.00 (H) 07/03/2023    CREATININE 2.99 (H) 07/02/2023   · Resolving, likely secondary cardiorenal  · Baseline creatinine 2.5-3  · Patient is status post pericardiocentesis and diuresis  · Critical care held diuresis 6/20/2023 when fluid shifts secondary to large pericardial drainage  · Currently on torsemide 40 mg daily  · Repeat BMP in the morning

## 2023-07-04 NOTE — ASSESSMENT & PLAN NOTE
Wt Readings from Last 3 Encounters:   07/04/23 66.3 kg (146 lb 2.6 oz)   06/28/23 74.5 kg (164 lb 3.9 oz)   05/11/23 66.7 kg (147 lb)     · Diuretics held on admission in the setting of shock and KARINA  · Torsemide 40 mg daily restarted per cardiology  · I/O, daily weight, low-sodium diet with fluid restriction

## 2023-07-04 NOTE — ASSESSMENT & PLAN NOTE
· Large acute on chronic pericardial effusion concerning for possible early hemodynamic changes for tamponade  · Sp pericardiocentesis 6/30, pericardial drain in place  · Cultures are negative  · Repeat echo 7/3/2023 showed small loculated pericardial effusion at the apex, small effusion along the RA free wall  · PAC removed  · Plan to remove pericardial drain when there is less than 30 cc over 24 hours  · Follow-up cardiology recommendations

## 2023-07-04 NOTE — CASE MANAGEMENT
Case Management Discharge Planning Note    Patient name Zabrina Ochoa  Location [address]/[facility] 231-78 MRN [id number]  : 1940 Date 2023       Current Admission Date: 2023  Current Admission Diagnosis:Pericardial effusion   Patient Active Problem List    Diagnosis Date Noted   • Encephalopathy 2023   • Empyema of lung ([address]) 2023   • Hypoglycemia 2023   • Thrombocytopenia ([address]) 2023   • Diarrhea 2023   • Hypothermia 2023   • Pneumonia 2023   • Septic shock ([address]) 2023   • Urinary retention 2023   • Macrocytosis 2023   • Hyponatremia 2023   • Anemia 2023   • Chronic kidney disease-mineral and bone disorder 2023   • Advanced care planning/counseling discussion 2023   • Acute kidney injury superimposed on chronic kidney disease ([address]) 2022   • Benign hypertension with CKD (chronic kidney disease) stage IV ([facility]) 2022   • Persistent proteinuria 2022   • COVID-19 2022   • Electrolyte abnormality 2022   • Cellulitis of left upper extremity 2021   • Atrial fibrillation with slow ventricular response ([address]) 2021   • Vitamin D deficiency 10/18/2019   • Chronic combined systolic and diastolic congestive heart failure ([address]) 2019   • Pericardial effusion 2019   • Leg edema 01/10/2019   • 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 ([address]) 01/10/2019   • PVC (premature ventricular contraction) 2018   • Essential hypertension 2018   • Closed traumatic displaced fracture of distal end of left radius with malunion 2018      LOS (days): 6  Geometric Mean LOS (GMLOS) (days): 4.90  Days to GMLOS:-0.7     OBJECTIVE:  Risk of Unplanned Readmission Score: 24.74         Current admission status: Inpatient   Preferred Pharmacy:   [id number] [place], [address] [address]  Phone: [phone] Fax: [phone]    Primary Care Provider: Lan Mackey DO    Primary Insurance: MEDICARE  Secondary Insurance: BLUE CROSS    DISCHARGE DETAILS:    Discharge planning discussed with[de-identified] patient and son by phone        Additional Comments: PT/OT is recommending rehab at DC. Patient asked CM to talk to son Salima Balderas regarding DC needs. Salima [place] was contacted and in agreement to blanket STR referrals within 15 miles of patient's zip code. Referral sent via [employer] and awaiting determination.   CM to be available

## 2023-07-04 NOTE — PROGRESS NOTES
[address] [facility]  Progress Note  Name: Alok Ortez I  MRN: [id number]  Unit/Bed#: [facility] 402-01 I Date of Admission: 6/28/2023   Date of Service: 7/4/2023 I Hospital Day: 6    Assessment/Plan   * Pericardial effusion  Assessment & Plan  · Large acute on chronic pericardial effusion concerning for possible early hemodynamic changes for tamponade  · Sp pericardiocentesis 6/30, pericardial drain in place  · Cultures are negative  · Repeat echo 7/3/2023 showed small loculated pericardial effusion at the apex, small effusion along the RA free wall  · PAC removed  · Plan to remove pericardial drain when there is less than 30 cc over 24 hours  · Follow-up cardiology recommendations    Chronic combined systolic and diastolic congestive heart failure ([address])  Assessment & Plan  Wt Readings from Last 3 Encounters:   07/04/23 66.3 kg (146 lb 2.6 oz)   06/28/23 74.5 kg (164 lb 3.9 oz)   05/11/23 66.7 kg (147 lb)     · Diuretics held on admission in the setting of shock and KARINA  · Torsemide 40 mg daily restarted per cardiology  · I/O, daily weight, low-sodium diet with fluid restriction         Pneumonia  Assessment & Plan  · Pneumonia C/F with right lower lobe empyema status post right chest tube by thoracic surgery  · Patient has completed 7-day cefepime course      Encephalopathy  Assessment & Plan  · Likely toxic/metabolic secondary to critical illness and delirium  · Continue delirium precautions  · Patient with difficulty sleeping  · Melatonin and mirtazapine were added and critical care    Urinary retention  Assessment & Plan  · Dupont placed for urinary retention, can consider void trial  · Does have history of BPH, continue Flomax.    ·  Consider reaching out to urology for outpatient follow-up    Anemia  Assessment & Plan  · Hemoglobin 11.0 present on admission  · Hemoglobin today 7.9  · Platelet count with improvement at 126  · Patient with decreased from 8.7-7.9 x 2    Acute kidney injury superimposed on chronic kidney disease [facility])  Assessment & Plan  Lab Results   Component Value Date    EGFR 20 07/04/2023    EGFR 18 07/03/2023    EGFR 18 07/02/2023    CREATININE 2.72 (H) 07/04/2023    CREATININE 3.00 (H) 07/03/2023    CREATININE 2.99 (H) 07/02/2023   · Resolving, likely secondary cardiorenal  · Baseline creatinine 2.5-3  · Patient is status post pericardiocentesis and diuresis  · Critical care held diuresis 6/20/2023 when fluid shifts secondary to large pericardial drainage  · Currently on torsemide 40 mg daily  · Repeat BMP in the morning      Atrial fibrillation with slow ventricular response (HCC)  Assessment & Plan  · Eliquis on hold currently  · Follow-up with cardiology when is it safe to restart    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 [facility])  Assessment & Plan  Lab Results   Component Value Date    HGBA1C 8.3 (H) 02/23/2023       Recent Labs     07/04/23  0142 07/04/23  0701 07/04/23  0854 07/04/23  1114   POCGLU 92 43* 186* 248*       Blood Sugar Average: Last 72 hrs:  · Added 10 units Lantus at at bedtime on 7/3/2023, hypoglycemic this morning, decrease to Lantus 5 units  · Added 5 units lispro 3 times daily with meals on 7/3/2023  · Continue sliding scale insulin algorithm 3  (P) [id number]             VTE Pharmacologic Prophylaxis:   Moderate Risk (Score 3-4) - Pharmacological DVT Prophylaxis Ordered: heparin. Patient Centered Rounds: I performed bedside rounds with nursing staff today. Discussions with Specialists or Other Care Team Provider: Nursing    Education and Discussions with Family / Patient: Updated  (son) via phone.     Total Time Spent on Date of Encounter in care of patient: 35 minutes This time was spent on one or more of the following: performing physical exam; counseling and coordination of care; obtaining or reviewing history; documenting in the medical record; reviewing/ordering tests, medications or procedures; communicating with other healthcare professionals and discussing with patient's family/caregivers. Current Length of Stay: 6 day(s)  Current Patient Status: Inpatient   Certification Statement: The patient will continue to require additional inpatient hospital stay due to Pericardial effusion  Discharge Plan: Anticipate discharge in >72 hrs to discharge location to be determined pending rehab evaluations. Code Status: Level 3 - DNAR and DNI    Subjective:   Patient was seen and evaluated bedside. No overnight events per nursing staff. Patient has no complaint    Objective:     Vitals:   Temp (24hrs), Av.4 °F (36.3 °C), Min:96.8 °F (36 °C), Max:98.3 °F (36.8 °C)    Temp:  [96.8 °F (36 °C)-98.3 °F (36.8 °C)] 96.8 °F (36 °C)  HR:  [85-99] 90  Resp:  [12-20] 20  BP: (113-123)/(55-87) 123/57  SpO2:  [97 %-100 %] 100 %  Body mass index is 21.58 kg/m². Input and Output Summary (last 24 hours): Intake/Output Summary (Last 24 hours) at 2023 1347  Last data filed at 2023 1143  Gross per 24 hour   Intake 1960 ml   Output 2380 ml   Net -420 ml       Physical Exam:   Physical Exam  Constitutional:       General: He is not in acute distress. Comments: Elderly, frail   HENT:      Head: Atraumatic. Cardiovascular:      Rate and Rhythm: Normal rate and regular rhythm. Heart sounds: No murmur heard. No friction rub. No gallop. Comments: Pericardial drain  Pulmonary:      Effort: Pulmonary effort is normal. No respiratory distress. Breath sounds: Normal breath sounds. No wheezing. Abdominal:      General: Bowel sounds are normal. There is no distension. Palpations: Abdomen is soft. Tenderness: There is no abdominal tenderness. Musculoskeletal:         General: No swelling. Cervical back: Neck supple. Skin:     General: Skin is warm and dry. Neurological:      General: No focal deficit present. Mental Status: He is alert. Psychiatric:         Mood and Affect: Mood normal.          Additional Data:     Labs:  Results from last 7 days   Lab Units 07/04/23  0450   WBC Thousand/uL 11.22*   HEMOGLOBIN g/dL 7.9*   HEMATOCRIT % 23.5*   PLATELETS Thousands/uL 126*   NEUTROS PCT % 78*   LYMPHS PCT % 13*   MONOS PCT % 7   EOS PCT % 1     Results from last 7 days   Lab Units 07/04/23  0450 07/01/23  0429 06/29/23  0209   SODIUM mmol/L 137   < > 135   POTASSIUM mmol/L 4.0   < > 4.8   CHLORIDE mmol/L 103   < > 104   CO2 mmol/L 28   < > 28   BUN mg/dL 109*   < > 71*   CREATININE mg/dL 2.72*   < > 2.75*   ANION GAP mmol/L 6   < > 3   CALCIUM mg/dL 8.4   < > 8.3   ALBUMIN g/dL  --   --  2.3*   TOTAL BILIRUBIN mg/dL  --   --  0.86   ALK PHOS U/L  --   --  145*   ALT U/L  --   --  65   AST U/L  --   --  42   GLUCOSE RANDOM mg/dL 43*   < > 177*    < > = values in this interval not displayed.          Results from last 7 days   Lab Units 07/04/23  1114 07/04/23  0854 07/04/23  0701 07/04/23  0142 07/03/23  2214 07/03/23  1723 07/03/23  1206 07/03/23  0529 07/02/23  2045 07/02/23  1606 07/02/23  1034 07/02/23  0603   POC GLUCOSE mg/dl 248* 186* 43* 92 152* 120 132 363* 357* 355* 321* 326*         Results from last 7 days   Lab Units 07/01/23  1219   PROCALCITONIN ng/ml 0.26*       Lines/Drains:  Invasive Devices     Peripheral Intravenous Line  Duration           Long-Dwell Peripheral IV (Midline) 50/26/76 Right Basilic 9 days          Drain  Duration           Chest Tube Right 10.2 Fr. 9 days    Urethral Catheter 16 Fr. 6 days    Closed/Suction Drain Pericardial Other (Comment) 6 Fr. 3 days              Urinary Catheter:  Goal for removal: Voiding trial when ambulation improves               Imaging: Reviewed radiology reports from this admission including: ECHO    Recent Cultures (last 7 days):   Results from last 7 days   Lab Units 06/30/23  1341   GRAM STAIN RESULT  No Polys or Bacteria seen   BODY FLUID CULTURE, STERILE  No growth       Last 24 Hours Medication List:   Current Facility-Administered Medications   Medication Dose Route Frequency Provider Last Rate   • ascorbic acid  500 mg Oral Daily Selvin Loss, PA-C     • atorvastatin  40 mg Oral Daily With [employer] Dean Chiang, PA-C     • cholecalciferol  2,000 Units Oral Daily Selvin Loss, PA-C     • docusate sodium  100 mg Oral BID Selvin Loss, PA-C     • insulin glargine  5 Units Subcutaneous HS Daniela Michele MD     • insulin lispro  1-5 Units Subcutaneous TID AC SUSANA Mustafa     • insulin lispro  1-5 Units Subcutaneous HS SUSANA Mustafa     • insulin lispro  5 Units Subcutaneous TID With Meals Daniela Michele MD     • latanoprost  1 drop Both Eyes HS Selvin Loss, PA-C     • melatonin  6 mg Oral HS Chris Silverman, PA-C     • ondansetron  4 mg Intravenous Once SUSANA Mustafa     • polyethylene glycol  17 g Oral Daily Selvin Loss, PA-C     • senna  2 tablet Oral HS Selvin Loss, PA-C     • tamsulosin  0.4 mg Oral Daily With [employer], PA-C     • timolol  1 drop Both Eyes Daily Selvin Loss, PA-C     • torsemide  40 mg Oral Daily Selvin Loss, PA-C          Today, Patient Was Seen By: Daniela Michele MD    **Please Note: This note may have been constructed using a voice recognition system. **

## 2023-07-04 NOTE — ASSESSMENT & PLAN NOTE
· Hemoglobin 11.0 present on admission  · Hemoglobin today 7.9  · Platelet count with improvement at 126  · Patient with decreased from 8.7-7.9 x 2

## 2023-07-04 NOTE — PLAN OF CARE
Problem: Prexisting or High Potential for Compromised Skin Integrity  Goal: Skin integrity is maintained or improved  Description: INTERVENTIONS:  - Identify patients at risk for skin breakdown  - Assess and monitor skin integrity  - Assess and monitor nutrition and hydration status  - Monitor labs   - Assess for incontinence   - Turn and reposition patient  - Assist with mobility/ambulation  - Relieve pressure over bony prominences  - Avoid friction and shearing  - Provide appropriate hygiene as needed including keeping skin clean and dry  - Evaluate need for skin moisturizer/barrier cream  - Collaborate with interdisciplinary team   - Patient/family teaching  - Consider wound care consult   Outcome: Progressing     Problem: MOBILITY - ADULT  Goal: Maintain or return to baseline ADL function  Description: INTERVENTIONS:  -  Assess patient's ability to carry out ADLs; assess patient's baseline for ADL function and identify physical deficits which impact ability to perform ADLs (bathing, care of mouth/teeth, toileting, grooming, dressing, etc.)  - Assess/evaluate cause of self-care deficits   - Assess range of motion  - Assess patient's mobility; develop plan if impaired  - Assess patient's need for assistive devices and provide as appropriate  - Encourage maximum independence but intervene and supervise when necessary  - Involve family in performance of ADLs  - Assess for home care needs following discharge   - Consider OT consult to assist with ADL evaluation and planning for discharge  - Provide patient education as appropriate  Outcome: Progressing     Problem: PAIN - ADULT  Goal: Verbalizes/displays adequate comfort level or baseline comfort level  Description: Interventions:  - Encourage patient to monitor pain and request assistance  - Assess pain using appropriate pain scale  - Administer analgesics based on type and severity of pain and evaluate response  - Implement non-pharmacological measures as appropriate and evaluate response  - Consider cultural and social influences on pain and pain management  - Notify physician/advanced practitioner if interventions unsuccessful or patient reports new pain  Outcome: Progressing     Problem: INFECTION - ADULT  Goal: Absence or prevention of progression during hospitalization  Description: INTERVENTIONS:  - Assess and monitor for signs and symptoms of infection  - Monitor lab/diagnostic results  - Monitor all insertion sites, i.e. indwelling lines, tubes, and drains  - Monitor endotracheal if appropriate and nasal secretions for changes in amount and color  - [place] appropriate cooling/warming therapies per order  - Administer medications as ordered  - Instruct and encourage patient and family to use good hand hygiene technique  - Identify and instruct in appropriate isolation precautions for identified infection/condition  Outcome: Progressing  Goal: Absence of fever/infection during neutropenic period  Description: INTERVENTIONS:  - Monitor WBC    Outcome: Progressing     Problem: SAFETY ADULT  Goal: Maintain or return to baseline ADL function  Description: INTERVENTIONS:  -  Assess patient's ability to carry out ADLs; assess patient's baseline for ADL function and identify physical deficits which impact ability to perform ADLs (bathing, care of mouth/teeth, toileting, grooming, dressing, etc.)  - Assess/evaluate cause of self-care deficits   - Assess range of motion  - Assess patient's mobility; develop plan if impaired  - Assess patient's need for assistive devices and provide as appropriate  - Encourage maximum independence but intervene and supervise when necessary  - Involve family in performance of ADLs  - Assess for home care needs following discharge   - Consider OT consult to assist with ADL evaluation and planning for discharge  - Provide patient education as appropriate  Outcome: Progressing     Problem: DISCHARGE PLANNING  Goal: Discharge to home or other facility with appropriate resources  Description: INTERVENTIONS:  - Identify barriers to discharge w/patient and caregiver  - Arrange for needed discharge resources and transportation as appropriate  - Identify discharge learning needs (meds, wound care, etc.)  - Arrange for interpretive services to assist at discharge as needed  - Refer to Case Management Department for coordinating discharge planning if the patient needs post-hospital services based on physician/advanced practitioner order or complex needs related to functional status, cognitive ability, or social support system  Outcome: Progressing     Problem: Knowledge Deficit  Goal: Patient/family/caregiver demonstrates understanding of disease process, treatment plan, medications, and discharge instructions  Description: Complete learning assessment and assess knowledge base. Interventions:  - Provide teaching at level of understanding  - Provide teaching via preferred learning methods  Outcome: Progressing     Problem: Nutrition/Hydration-ADULT  Goal: Nutrient/Hydration intake appropriate for improving, restoring or maintaining nutritional needs  Description: Monitor and assess patient's nutrition/hydration status for malnutrition. Collaborate with interdisciplinary team and initiate plan and interventions as ordered. Monitor patient's weight and dietary intake as ordered or per policy. Utilize nutrition screening tool and intervene as necessary. Determine patient's food preferences and provide high-protein, high-caloric foods as appropriate.      INTERVENTIONS:  - Monitor oral intake, urinary output, labs, and treatment plans  - Assess nutrition and hydration status and recommend course of action  - Evaluate amount of meals eaten  - Assist patient with eating if necessary   - Allow adequate time for meals  - Recommend/ encourage appropriate diets, oral nutritional supplements, and vitamin/mineral supplements  - Order, calculate, and assess calorie counts as needed  - Recommend, monitor, and adjust tube feedings and TPN/PPN based on assessed needs  - Assess need for intravenous fluids  - Provide specific nutrition/hydration education as appropriate  - Include patient/family/caregiver in decisions related to nutrition  Outcome: Progressing     Problem: CARDIOVASCULAR - ADULT  Goal: Maintains optimal cardiac output and hemodynamic stability  Description: INTERVENTIONS:  - Monitor I/O, vital signs and rhythm  - Monitor for S/S and trends of decreased cardiac output  - Administer and titrate ordered vasoactive medications to optimize hemodynamic stability  - Assess quality of pulses, skin color and temperature  - Assess for signs of decreased coronary artery perfusion  - Instruct patient to report change in severity of symptoms  Outcome: Progressing  Goal: Absence of cardiac dysrhythmias or at baseline rhythm  Description: INTERVENTIONS:  - Continuous cardiac monitoring, vital signs, obtain 12 lead EKG if ordered  - Administer antiarrhythmic and heart rate control medications as ordered  - Monitor electrolytes and administer replacement therapy as ordered  Outcome: Progressing     Problem: RESPIRATORY - ADULT  Goal: Achieves optimal ventilation and oxygenation  Description: INTERVENTIONS:  - Assess for changes in respiratory status  - Assess for changes in mentation and behavior  - Position to facilitate oxygenation and minimize respiratory effort  - Oxygen administered by appropriate delivery if ordered  - Initiate smoking cessation education as indicated  - Encourage broncho-pulmonary hygiene including cough, deep breathe, Incentive Spirometry  - Assess the need for suctioning and aspirate as needed  - Assess and instruct to report SOB or any respiratory difficulty  - Respiratory Therapy support as indicated  Outcome: Progressing

## 2023-07-04 NOTE — ASSESSMENT & PLAN NOTE
· Pneumonia C/F with right lower lobe empyema status post right chest tube by thoracic surgery  · Patient has completed 7-day cefepime course

## 2023-07-04 NOTE — PROGRESS NOTES
Cardiology Progress note. Unit/Bed#: [facility] 402-01 Encounter: [id number]        Lyanne Lefort 78 y.o. male [id number]  Hospital Stay Days: 6    Assessment and Plan      Current Problem List   Principal Problem:    Pericardial effusion  Active Problems:    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 (HCC)    Chronic combined systolic and diastolic congestive heart failure (HCC)    Atrial fibrillation with slow ventricular response (HCC)    Acute kidney injury superimposed on chronic kidney disease (HCC)    Anemia    Urinary retention    Pneumonia    Encephalopathy    Assessment/Plan: This is a 78-year-old male with past medical history of heart failure preserved EF 60%, CKD, type 2 diabetes, atrial fibrillation on Eliquis who initially presented with complicated pneumonia with both pleural and pericardial effusion. Status post chest tube and antibiotics for pleural effusion. S/p pericardiocentesis with significant resolution of his pericardial fusion. Still has pericardial effusion at the apex. #Acute on chronic pericardial fusion with tamponade s/p pericardiocentesis this admission  #Lung empyema status post chest tube placement this admission  #Complicated pneumonia s/p antibiotics this admission  #Atrial fibrillation  #KARINA on CKD  #Type 2 diabetes  # HFpEF 60%  # CAD    Plan:    · Patient still has persistent drainage from his pericardial drain. We will keep pericardial drain in place for now. · Decrease frequency of pericardial catheter flushes to daily with 10 cc of fluid. · If continues to have persistent output then should consider pericardial window with Thoracics this week. · Repeat echo 7/3 with persistent loculated fluid at the apex and small fluid around RA. · Continue to hold home Eliquis for history of A-fib. · Continue home torsemide 40 mg daily since patient still volume overloaded.   · Rest of care per primary team.         Subjective Pt admits to feeling well. Denies any chest pain or shortness of breath. Objective     Vitals: Temp (24hrs), Av.4 °F (36.3 °C), Min:96.8 °F (36 °C), Max:98.3 °F (36.8 °C)  Current: Temperature: (!) 96.8 °F (36 °C)  Patient Vitals for the past 24 hrs:   BP Temp Temp src Pulse Resp SpO2 Weight   23 0902 123/57 -- -- 90 -- 100 % --   23 0711 120/57 (!) 96.8 °F (36 °C) -- 98 20 97 % --   23 0600 -- -- -- -- -- -- 66.3 kg (146 lb 2.6 oz)   23 0400 113/87 (!) 97.1 °F (36.2 °C) Oral 85 18 99 % --   23 0014 113/55 97.6 °F (36.4 °C) Oral 91 16 98 % --   23 1856 115/56 (!) 97 °F (36.1 °C) -- 94 12 100 % --   23 1455 114/55 98.3 °F (36.8 °C) -- 99 -- 99 % --    Body mass index is 21.58 kg/m². Physical Exam:  Physical Exam  Constitutional:       General: He is not in acute distress. Appearance: He is well-developed. He is not diaphoretic. HENT:      Head: Normocephalic and atraumatic. Nose: Nose normal.      Mouth/Throat:      Pharynx: No oropharyngeal exudate. Eyes:      General: No scleral icterus. Right eye: No discharge. Left eye: No discharge. Cardiovascular:      Rate and Rhythm: Normal rate. Rhythm irregular. Heart sounds: Normal heart sounds. No murmur heard. No friction rub. No gallop. Pulmonary:      Effort: Pulmonary effort is normal. No respiratory distress. Breath sounds: No stridor. No wheezing or rales. Abdominal:      General: Bowel sounds are normal. There is no distension. Palpations: Abdomen is soft. Tenderness: There is no abdominal tenderness. There is no guarding. Musculoskeletal:         General: Normal range of motion. Cervical back: Normal range of motion and neck supple. Skin:     General: Skin is warm. Findings: No erythema. Neurological:      Mental Status: He is alert and oriented to person, place, and time.          Invasive Devices     Peripheral Intravenous Line  Duration Long-Dwell Peripheral IV (Midline) 05/91/47 Right Basilic 9 days          Drain  Duration           Chest Tube Right 10.2 Fr. 9 days    Urethral Catheter 16 Fr. 6 days    Closed/Suction Drain Pericardial Other (Comment) 6 Fr. 3 days                    Labs:   Results from last 7 days   Lab Units 07/04/23 0450 07/03/23 0449 07/02/23  0423 07/01/23  0429 06/29/23  0209 06/28/23  1155   WBC Thousand/uL 11.22* 12.60* 12.56* 9.88 7.83 6.50   HEMOGLOBIN g/dL 7.9* 7.9* 8.7* 9.5* 11.4* 11.0*   HEMATOCRIT % 23.5* 22.9* 26.4* 29.0* 34.6* 32.9*   PLATELETS Thousands/uL 126* 97* 81* 49* 55* 49*   NEUTROS PCT % 78* 84* 86* 86* 69 73   MONOS PCT % 7 6 6 6 12 12   EOS PCT % 1 0 0 0 1 1      Results from last 7 days   Lab Units 07/04/23 0450 07/03/23 0449 07/02/23 0423 07/01/23  1219 07/01/23  0429 06/29/23  0209 06/28/23  1155   SODIUM mmol/L 137 134* 134* 135 137 135 132*   POTASSIUM mmol/L 4.0 4.3 4.8 4.7 4.8 4.8 5.2   CHLORIDE mmol/L 103 101 101 101 105 104 102   CO2 mmol/L 28 28 29 24 22 28 25   BUN mg/dL 109* 116* 92* 80* 71* 71* 65*   CREATININE mg/dL 2.72* 3.00* 2.99* 3.29* 2.69* 2.75* 2.75*   CALCIUM mg/dL 8.4 8.1* 8.4 8.8 8.2* 8.3 7.7*   ALK PHOS U/L  --   --   --   --   --  145* 124*   ALT U/L  --   --   --   --   --  65 54*   AST U/L  --   --   --   --   --  42 38   MAGNESIUM mg/dL  --   --  2.4 2.3 2.2  --   --    PHOSPHORUS mg/dL  --   --  3.5 4.2* 4.4*  --   --    EGFR ml/min/1.73sq m 20 18 18 16 20 20 20                 No results found for: "PHART", "QJR3SGH", "PO2ART", "EVW0XQA", "M0OPLEBC", "BEART", "SOURCE"  No components found for: "HIV1X2"  No results found for: "HAV", "HEPAIGM", "HEPBIGM", "HEPBCAB", "HBEAG", "HEPCAB"  Lab Results   Component Value Date    SPEP See Comment 02/27/2023    UPEP See Comment 02/26/2023      Lab Results   Component Value Date    HGBA1C 8.3 (H) 02/23/2023    HGBA1C 8.5 10/18/2022    HGBA1C 7.1 02/04/2021    HGBA1C 7.1 02/04/2021     No results found for: "CHOL"   Lab Results   Component Value Date    HDL 37 (A) 08/20/2021    HDL 34 (L) 01/15/2019      Lab Results   Component Value Date    LDLCALC 54 01/15/2019      Lab Results   Component Value Date    TRIG 63 08/20/2021    TRIG 70 01/15/2019     No components found for: "PROCAL"      Micro:  Results from last 7 days   Lab Units 06/30/23  1341   GRAM STAIN RESULT  No Polys or Bacteria seen   BODY FLUID CULTURE, STERILE  No growth     Urinalysis:  No results found for: "AMPHETUR", "BDZUR", "COCAINEUR", "OPIATEUR", "PCPUR", "Jessie Kaia", "ETOH", "ACTMNPHEN", "SALICYLATE"       Invalid input(s): "URIBILINOGEN"        Intake and Outputs:  I/O       06/30 0701 07/01 0700 07/01 0701 07/02 0700 07/02 0701 07/03 0700    P. O. 480 1620 354    I.V. (mL/kg)  30 (0.5)     NG/GT  30     IV Piggyback 480 100     Total Intake(mL/kg) 960 (13.7) 1780 (26.7) 354 (5.3)    Urine (mL/kg/hr) 1138 (0.7) 1010 (0.6) 300 (1)    Drains 860 300     Stool  0 0    Chest Tube 220 180 0    Total Output 2218 1490 300    Net -1258 +290 +54           Unmeasured Stool Occurrence  1 x 1 x        Nutrition:  Diet Cardiovascular; Sodium 2 GM; Fluid Restriction 1500 ML, Consistent Carbohydrate Diet Level 1 (4 carb servings/60 grams CHO/meal)  Radiology Results:   XR chest portable ICU   Final Result by Skyler Dennis MD (07/01 1148)      Pulmonary artery catheter in main pulmonary artery. Persistent enlargement of the cardiac silhouette with pericardial drain. Right pleural pigtail catheter with stable small loculated right hydropneumothorax and mild bibasilar atelectasis. Workstation performed: [id number]         XR chest portable ICU   Final Result by Skyler Dennis MD (07/01 1611)      Persistent small loculated right hydropneumothorax with moderate left effusion and bibasilar atelectasis. Pulmonary artery catheter in right interlobar pulmonary artery, subsequently retracted.                Workstation performed: [id number] XR chest portable ICU   Final Result by Ryan Levy MD (07/02 1908)      Pericardial drain with slight decrease in marked enlargement of cardiac silhouette from pericardial effusion. Persistent small loculated right basilar hydropneumothorax and small left effusion. Persistent right base atelectasis.                Workstation performed: [id number]           Scheduled Medications:  ascorbic acid, 500 mg, Daily  atorvastatin, 40 mg, Daily With Dinner  cholecalciferol, 2,000 Units, Daily  docusate sodium, 100 mg, BID  insulin glargine, 5 Units, HS  insulin lispro, 1-5 Units, TID AC  insulin lispro, 1-5 Units, HS  insulin lispro, 5 Units, TID With Meals  latanoprost, 1 drop, HS  melatonin, 6 mg, HS  ondansetron, 4 mg, Once  polyethylene glycol, 17 g, Daily  senna, 2 tablet, HS  tamsulosin, 0.4 mg, Daily With Dinner  timolol, 1 drop, Daily  torsemide, 40 mg, Daily      PRN MEDS:     Last 24 Hour Meds: :   Medication Administration - last 24 hours from 07/03/2023 1312 to 07/04/2023 1312       Date/Time Order Dose Route Action Action by     07/04/2023 0909 EDT ascorbic acid (VITAMIN C) tablet 500 mg 500 mg Oral Given Jaguar Mg RN     07/03/2023 1721 EDT atorvastatin (LIPITOR) tablet 40 mg 40 mg Oral Given Staci Lemus RN     07/04/2023 0910 EDT cholecalciferol (VITAMIN D3) tablet 2,000 Units 2,000 Units Oral Given Jaguar Mg RN     07/04/2023 0900 EDT docusate sodium (COLACE) capsule 100 mg -- Oral Canceled Entry Tristen Pearson RN     07/03/2023 1721 EDT docusate sodium (COLACE) capsule 100 mg 100 mg Oral Not Given Staci Lemus RN     07/03/2023 2227 EDT latanoprost (XALATAN) 0.005 % ophthalmic solution 1 drop 1 drop Both Eyes Given Michael Shupp     07/04/2023 0900 EDT polyethylene glycol (MIRALAX) packet 17 g -- Oral Canceled Entry Tristen Pearson RN     07/04/2023 2200 EDT senna (SENOKOT) tablet 17.2 mg 0 mg Oral Hold Tristen Pearson, ROBERTO     07/03/2023 2226 EDT senna (SENOKOT) tablet 17.2 mg 17.2 mg Oral Given Michael [facility]     07/03/2023 1721 EDT tamsulosin (FLOMAX) capsule 0.4 mg 0.4 mg Oral Given Vesta Gupta RN     07/04/2023 0913 EDT timolol (TIMOPTIC) 0.5 % ophthalmic solution 1 drop 1 drop Both Eyes Given Cloyce Closs, ROBERTO     07/04/2023 6595 EDT torsemide (DEMADEX) tablet 40 mg 40 mg Oral Given Cloyce Closs, ROBERTO     07/03/2023 2226 EDT melatonin tablet 6 mg 6 mg Oral Given Michael ari     07/04/2023 1118 EDT insulin lispro (HumaLOG) 100 units/mL subcutaneous injection 1-5 Units 2 Units Subcutaneous Given Cloyce Closs, RN     07/04/2023 5875 EDT insulin lispro (HumaLOG) 100 units/mL subcutaneous injection 1-5 Units -- Subcutaneous Not Given Maggy Kelly RN     07/03/2023 1722 EDT insulin lispro (HumaLOG) 100 units/mL subcutaneous injection 1-5 Units -- Subcutaneous Not Given Vesta Gupta RN     07/03/2023 2225 EDT insulin lispro (HumaLOG) 100 units/mL subcutaneous injection 1-5 Units 1 Units Subcutaneous Given [facility]     07/03/2023 2225 EDT insulin glargine (LANTUS) subcutaneous injection 10 Units 0.1 mL 10 Units Subcutaneous Given [facility]     07/04/2023 0711 EDT insulin lispro (HumaLOG) 100 units/mL subcutaneous injection 5 Units 0 Units Subcutaneous Hold Maggy Kelly RN     07/03/2023 1724 EDT insulin lispro (HumaLOG) 100 units/mL subcutaneous injection 5 Units 5 Units Subcutaneous Given Vesta Gupta RN     07/04/2023 0142 EDT insulin lispro (HumaLOG) 100 units/mL subcutaneous injection 1-5 Units -- Subcutaneous Not Given Michael [facility]     07/04/2023 0708 EDT dextrose 50 % IV solution 25 mL 25 mL Intravenous Given Maggy Kelly RN     07/04/2023 1118 EDT insulin lispro (HumaLOG) 100 units/mL subcutaneous injection 3 Units 3 Units Subcutaneous Given Cloyce Closs, RN          PLEASE NOTE:  This encounter was completed utilizing the [employer]Fluency Direct Speech Voice Recognition Software.  Grammatical errors, random word insertions, pronoun errors and incomplete sentences are occasional consequences of the system due to software limitations, ambient noise and hardware issues. These may be missed by proof reading prior to affixing electronic signature. Any questions or concerns about the content, text or information contained within the body of this dictation should be directly addressed to the physician for clarification. Please do not hesitate to call me directly if you have any any questions or concerns.

## 2023-07-04 NOTE — ASSESSMENT & PLAN NOTE
Lab Results   Component Value Date    HGBA1C 8.3 (H) 02/23/2023       Recent Labs     07/04/23  0142 07/04/23  0701 07/04/23  0854 07/04/23  1114   POCGLU 92 43* 186* 248*       Blood Sugar Average: Last 72 hrs:  · Added 10 units Lantus at at bedtime on 7/3/2023, hypoglycemic this morning, decrease to Lantus 5 units  · Added 5 units lispro 3 times daily with meals on 7/3/2023  · Continue sliding scale insulin algorithm 3  (P) [phone]

## 2023-07-05 PROBLEM — N18.9 ACUTE KIDNEY INJURY SUPERIMPOSED ON CHRONIC KIDNEY DISEASE (HCC): Chronic | Status: ACTIVE | Noted: 2022-11-07

## 2023-07-05 PROBLEM — D64.9 ANEMIA: Chronic | Status: ACTIVE | Noted: 2023-02-09

## 2023-07-05 PROBLEM — G93.40 ENCEPHALOPATHY: Chronic | Status: ACTIVE | Noted: 2023-07-02

## 2023-07-05 PROBLEM — N17.9 ACUTE KIDNEY INJURY SUPERIMPOSED ON CHRONIC KIDNEY DISEASE (HCC): Chronic | Status: ACTIVE | Noted: 2022-11-07

## 2023-07-05 PROBLEM — R33.9 URINARY RETENTION: Chronic | Status: ACTIVE | Noted: 2023-02-25

## 2023-07-05 PROBLEM — J18.9 PNEUMONIA: Chronic | Status: ACTIVE | Noted: 2023-06-24

## 2023-07-05 LAB
ANION GAP SERPL CALCULATED.3IONS-SCNC: 3 MMOL/L
BASOPHILS # BLD AUTO: 0 THOUSANDS/ÂΜL (ref 0–0.1)
BASOPHILS NFR BLD AUTO: 0 % (ref 0–1)
BUN SERPL-MCNC: 102 MG/DL (ref 5–25)
CALCIUM SERPL-MCNC: 8 MG/DL (ref 8.3–10.1)
CHLORIDE SERPL-SCNC: 100 MMOL/L (ref 96–108)
CO2 SERPL-SCNC: 30 MMOL/L (ref 21–32)
CREAT SERPL-MCNC: 2.54 MG/DL (ref 0.6–1.3)
EOSINOPHIL # BLD AUTO: 0.05 THOUSAND/ÂΜL (ref 0–0.61)
EOSINOPHIL NFR BLD AUTO: 1 % (ref 0–6)
ERYTHROCYTE [DISTWIDTH] IN BLOOD BY AUTOMATED COUNT: 15.2 % (ref 11.6–15.1)
GFR SERPL CREATININE-BSD FRML MDRD: 22 ML/MIN/1.73SQ M
GLUCOSE SERPL-MCNC: 125 MG/DL (ref 65–140)
GLUCOSE SERPL-MCNC: 139 MG/DL (ref 65–140)
GLUCOSE SERPL-MCNC: 177 MG/DL (ref 65–140)
GLUCOSE SERPL-MCNC: 187 MG/DL (ref 65–140)
GLUCOSE SERPL-MCNC: 269 MG/DL (ref 65–140)
HCT VFR BLD AUTO: 22.5 % (ref 36.5–49.3)
HGB BLD-MCNC: 7.4 G/DL (ref 12–17)
IMM GRANULOCYTES # BLD AUTO: 0.03 THOUSAND/UL (ref 0–0.2)
IMM GRANULOCYTES NFR BLD AUTO: 0 % (ref 0–2)
LYMPHOCYTES # BLD AUTO: 0.96 THOUSANDS/ÂΜL (ref 0.6–4.47)
LYMPHOCYTES NFR BLD AUTO: 13 % (ref 14–44)
MCH RBC QN AUTO: 33.9 PG (ref 26.8–34.3)
MCHC RBC AUTO-ENTMCNC: 32.9 G/DL (ref 31.4–37.4)
MCV RBC AUTO: 103 FL (ref 82–98)
MONOCYTES # BLD AUTO: 0.6 THOUSAND/ÂΜL (ref 0.17–1.22)
MONOCYTES NFR BLD AUTO: 8 % (ref 4–12)
MYCOBACTERIUM SPEC CULT: NORMAL
NEUTROPHILS # BLD AUTO: 5.75 THOUSANDS/ÂΜL (ref 1.85–7.62)
NEUTS SEG NFR BLD AUTO: 78 % (ref 43–75)
NRBC BLD AUTO-RTO: 0 /100 WBCS
PLATELET # BLD AUTO: 122 THOUSANDS/UL (ref 149–390)
PMV BLD AUTO: 10.1 FL (ref 8.9–12.7)
POTASSIUM SERPL-SCNC: 4.1 MMOL/L (ref 3.5–5.3)
RBC # BLD AUTO: 2.18 MILLION/UL (ref 3.88–5.62)
RHODAMINE-AURAMINE STN SPEC: NORMAL
SODIUM SERPL-SCNC: 133 MMOL/L (ref 135–147)
WBC # BLD AUTO: 7.39 THOUSAND/UL (ref 4.31–10.16)

## 2023-07-05 PROCEDURE — 99232 SBSQ HOSP IP/OBS MODERATE 35: CPT | Performed by: INTERNAL MEDICINE

## 2023-07-05 PROCEDURE — 82948 REAGENT STRIP/BLOOD GLUCOSE: CPT

## 2023-07-05 PROCEDURE — 85025 COMPLETE CBC W/AUTO DIFF WBC: CPT

## 2023-07-05 PROCEDURE — NC001 PR NO CHARGE: Performed by: INTERNAL MEDICINE

## 2023-07-05 PROCEDURE — 80048 BASIC METABOLIC PNL TOTAL CA: CPT

## 2023-07-05 RX ORDER — ONDANSETRON 2 MG/ML
4 INJECTION INTRAMUSCULAR; INTRAVENOUS EVERY 4 HOURS PRN
Status: DISCONTINUED | OUTPATIENT
Start: 2023-07-05 | End: 2023-07-11 | Stop reason: HOSPADM

## 2023-07-05 RX ORDER — OXYCODONE HYDROCHLORIDE 5 MG/1
5 TABLET ORAL EVERY 4 HOURS PRN
Status: DISCONTINUED | OUTPATIENT
Start: 2023-07-05 | End: 2023-07-11 | Stop reason: HOSPADM

## 2023-07-05 RX ORDER — ACETAMINOPHEN 325 MG/1
650 TABLET ORAL EVERY 4 HOURS PRN
Status: DISCONTINUED | OUTPATIENT
Start: 2023-07-05 | End: 2023-07-11 | Stop reason: HOSPADM

## 2023-07-05 RX ORDER — HYDROMORPHONE HCL IN WATER/PF 6 MG/30 ML
0.2 PATIENT CONTROLLED ANALGESIA SYRINGE INTRAVENOUS EVERY 4 HOURS PRN
Status: DISCONTINUED | OUTPATIENT
Start: 2023-07-05 | End: 2023-07-11 | Stop reason: HOSPADM

## 2023-07-05 RX ADMIN — INSULIN LISPRO 1 UNITS: 100 INJECTION, SOLUTION INTRAVENOUS; SUBCUTANEOUS at 21:31

## 2023-07-05 RX ADMIN — MELATONIN TAB 3 MG 6 MG: 3 TAB at 21:30

## 2023-07-05 RX ADMIN — INSULIN GLARGINE 5 UNITS: 100 INJECTION, SOLUTION SUBCUTANEOUS at 21:30

## 2023-07-05 RX ADMIN — HEPARIN SODIUM 5000 UNITS: 5000 INJECTION INTRAVENOUS; SUBCUTANEOUS at 14:35

## 2023-07-05 RX ADMIN — HEPARIN SODIUM 5000 UNITS: 5000 INJECTION INTRAVENOUS; SUBCUTANEOUS at 21:31

## 2023-07-05 RX ADMIN — HEPARIN SODIUM 5000 UNITS: 5000 INJECTION INTRAVENOUS; SUBCUTANEOUS at 05:25

## 2023-07-05 RX ADMIN — Medication 2000 UNITS: at 08:29

## 2023-07-05 RX ADMIN — OXYCODONE HYDROCHLORIDE AND ACETAMINOPHEN 500 MG: 500 TABLET ORAL at 08:29

## 2023-07-05 RX ADMIN — TIMOLOL MALEATE 1 DROP: 5 SOLUTION/ DROPS OPHTHALMIC at 08:34

## 2023-07-05 RX ADMIN — ATORVASTATIN CALCIUM 40 MG: 40 TABLET, FILM COATED ORAL at 16:33

## 2023-07-05 RX ADMIN — TORSEMIDE 40 MG: 20 TABLET ORAL at 08:29

## 2023-07-05 RX ADMIN — LATANOPROST 1 DROP: 50 SOLUTION OPHTHALMIC at 21:31

## 2023-07-05 RX ADMIN — OXYCODONE HYDROCHLORIDE 5 MG: 5 TABLET ORAL at 08:29

## 2023-07-05 RX ADMIN — Medication 2.5 MG: at 00:49

## 2023-07-05 RX ADMIN — INSULIN LISPRO 1 UNITS: 100 INJECTION, SOLUTION INTRAVENOUS; SUBCUTANEOUS at 16:33

## 2023-07-05 RX ADMIN — INSULIN LISPRO 2 UNITS: 100 INJECTION, SOLUTION INTRAVENOUS; SUBCUTANEOUS at 11:12

## 2023-07-05 RX ADMIN — TAMSULOSIN HYDROCHLORIDE 0.4 MG: 0.4 CAPSULE ORAL at 16:33

## 2023-07-05 RX ADMIN — INSULIN LISPRO 5 UNITS: 100 INJECTION, SOLUTION INTRAVENOUS; SUBCUTANEOUS at 16:32

## 2023-07-05 RX ADMIN — INSULIN LISPRO 5 UNITS: 100 INJECTION, SOLUTION INTRAVENOUS; SUBCUTANEOUS at 11:12

## 2023-07-05 NOTE — ASSESSMENT & PLAN NOTE
• Likely toxic/metabolic secondary to critical illness and delirium  • Continue delirium precautions  • Patient with difficulty sleeping  • Melatonin and mirtazapine were added and critical care  • Improved mentation on 07/06 compared to previous, now AOx3  • Mentation seems to be closer to baseline per son

## 2023-07-05 NOTE — ASSESSMENT & PLAN NOTE
• Large acute on chronic pericardial effusion concerning for possible early hemodynamic changes for tamponade  ? S/p pericardiocentesis 6/30  • Cultures are negative  • Repeat echo 7/3/2023 showed small loculated pericardial effusion at the apex, small effusion along the RA free wall  • PAC removed  • Repeat echo 7/6/23 showed concentric remodeling, biatrial dilation but no pericardial effusion. The pericardium was markedly thickened and marked ventricular septal motion abnormality.   • Pericardial drain removed on 07/05 per cards, will likely not need pericardial window d/t no recurrence of effusion on echo  • Due to effusion, home coreg and imdur were held, to be reassessed by cardiology outpt  • Will continue current course, per cardiology  • Needs cardiology follow up on discharge

## 2023-07-05 NOTE — PROCEDURES
Minimal pericardial drain output for the past 24hrs. Patient was cleaned and draped. Sutures removed. Pericardial drain removed under sterile conditions. Patient tolerated without complaint. No blood loss. Dressing applied. Continue routine monitoring of vital signs. Repeat limited echo in 24 hrs.

## 2023-07-05 NOTE — RESTORATIVE TECHNICIAN NOTE
Restorative Technician Note      Patient Name: Altha Duane     Restorative Tech Visit Date: 07/05/23  Note Type: Mobility  Patient Position Upon Consult: Supine  Activity Performed: Transferred  Assistive Device: Roller walker  Patient Position at End of Consult: All needs within reach;  Bedside chair

## 2023-07-05 NOTE — ASSESSMENT & PLAN NOTE
• Dupont placed for urinary retention  • Dupont catheter now removed  • Voiding well  • Will monitor I/Os  • Does have history of BPH, continue Flomax

## 2023-07-05 NOTE — ASSESSMENT & PLAN NOTE
Lab Results   Component Value Date    EGFR 25 07/10/2023    EGFR 26 07/09/2023    EGFR 27 07/08/2023    CREATININE 2.24 (H) 07/10/2023    CREATININE 2.22 (H) 07/09/2023    CREATININE 2.13 (H) 07/08/2023     • Resolving, likely secondary cardiorenal  • Baseline creatinine 2.5-3, now at 2.24  • Patient is status post pericardiocentesis and diuresis  • Critical care held diuresis 6/20/2023 when fluid shifts secondary to large pericardial drainage  • Currently on torsemide 40 mg daily  • Now resolved

## 2023-07-05 NOTE — PROGRESS NOTES
INTERNAL MEDICINE RESIDENCY PROGRESS NOTE     Name: Leonor Bland   Age & Sex: 78 y.o. male   MRN: [id number]  Unit/Bed#: [facility] 402-01   Encounter: [id number]  Team: SLIM    PATIENT INFORMATION     Name: Leonor Bland   Age & Sex: 78 y.o. male   MRN: [id number]  Hospital Stay Days: 7    ASSESSMENT/PLAN     Principal Problem:    Pericardial effusion  Active Problems:    Chronic combined systolic and diastolic congestive heart failure (HCC)    Pneumonia    Encephalopathy    Urinary retention    Anemia    Acute kidney injury superimposed on chronic kidney disease (HCC)    Atrial fibrillation with slow ventricular response (HCC)    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 (HCC)      * Pericardial effusion  Assessment & Plan  • Large acute on chronic pericardial effusion concerning for possible early hemodynamic changes for tamponade  ?  Sp pericardiocentesis 6/30, pericardial drain in place  • Cultures are negative  • Repeat echo 7/3/2023 showed small loculated pericardial effusion at the apex, small effusion along the RA free wall  • PAC removed  • Plan to remove pericardial drain, minimal documented output, per cards  • If pericardial effusion recurs on subsequent echo, recommend pericardial window    Chronic combined systolic and diastolic congestive heart failure ([address])  Assessment & Plan  Wt Readings from Last 3 Encounters:   07/05/23 65 kg (143 lb 4.8 oz)   06/28/23 74.5 kg (164 lb 3.9 oz)   05/11/23 66.7 kg (147 lb)     • Diuretics held on admission in the setting of shock and KARINA  • Torsemide 40 mg daily restarted per cardiology  • 2.8L diuresis and net negative, exam still with 2+ pitting edema  • Continue diuresis  • I/O, daily weight, low-sodium diet with fluid restriction        Pneumonia  Assessment & Plan  • Pneumonia C/F with right lower lobe empyema status post right chest tube by thoracic surgery  • Drained ~600 mL this AM  • Patient has completed 7-day cefepime course    Encephalopathy  Assessment & Plan  • Likely toxic/metabolic secondary to critical illness and delirium  • Continue delirium precautions  • Patient with difficulty sleeping  • Melatonin and mirtazapine were added and critical care    Urinary retention  Assessment & Plan  • Dupont placed for urinary retention, can consider void trial  • Does have history of BPH, continue Flomax.    •  Consider reaching out to urology for outpatient follow-up    Anemia  Assessment & Plan  • Hemoglobin 11.0 present on admission  • Platelet count with improvement at 126  • Hgb decreased from 8.7-7.9 x 2, now at 7.4  • Continue trending CBC    Acute kidney injury superimposed on chronic kidney disease [facility])  Assessment & Plan  Lab Results   Component Value Date    EGFR 22 07/05/2023    EGFR 20 07/04/2023    EGFR 18 07/03/2023    CREATININE 2.54 (H) 07/05/2023    CREATININE 2.72 (H) 07/04/2023    CREATININE 3.00 (H) 07/03/2023     • Resolving, likely secondary cardiorenal  • Baseline creatinine 2.5-3, now at 2.54  • Patient is status post pericardiocentesis and diuresis  • Critical care held diuresis 6/20/2023 when fluid shifts secondary to large pericardial drainage  • Currently on torsemide 40 mg daily  • Trend BMPs    Atrial fibrillation with slow ventricular response (HCC)  Assessment & Plan  • Will continue to hold Eliquis d/t recent downtrending Hgb  • Will consider restarting closer to discharge    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 [facility])  Assessment & Plan  Lab Results   Component Value Date    HGBA1C 8.3 (H) 02/23/2023       Recent Labs     07/04/23  1603 07/04/23  2035 07/05/23  0541 07/05/23  1052   POCGLU 155* 230* 139 269*       Blood Sugar Average: Last 72 hrs:  (P) 218     • Added 10 units Lantus at at bedtime on 7/3/2023, hypoglycemic this morning, decrease to Lantus 5 units  • Added 5 units lispro 3 times daily with meals on 7/3/2023  • Continue sliding scale insulin algorithm 3        Disposition: pending improvement in Hgb and removal of chest tube    SUBJECTIVE     Patient seen and examined. No acute events overnight. Pt responses are limited to "yes" and "no". States he has no complaints this morning. OBJECTIVE     Vitals:    23 0500 23 0710 23 0828 23 1500   BP:  124/62  127/58   BP Location:  Right arm     Pulse:  73  89   Resp:  18     Temp:  (!) 97.3 °F (36.3 °C)     TempSrc:  Axillary     SpO2:  99%  100%   Weight: 66.2 kg (145 lb 14.4 oz)  65 kg (143 lb 4.8 oz)    Height:          Temperature:   Temp (24hrs), Av.5 °F (36.4 °C), Min:97.3 °F (36.3 °C), Max:97.6 °F (36.4 °C)    Temperature: (!) 97.3 °F (36.3 °C)  Intake & Output:  I/O        07 07 07 07 07    P. O. 1400 1178 600    NG/GT 90      Total Intake(mL/kg) 1490 (22.5) 1178 (17.8) 600 (9.2)    Urine (mL/kg/hr) 2300 (1.4) 2850 (1.8) 400 (0.7)    Drains 350 0 0    Stool 0 0 0    Chest Tube 80 130 0    Total Output 2730 2980 400    Net -1240 -1802 +200           Unmeasured Stool Occurrence 2 x 2 x 2 x        Weights:   IBW (Ideal Body Weight): 70.7 kg    Body mass index is 21.16 kg/m². Weight (last 2 days)     Date/Time Weight    23 0828 65 (143.3)    23 0500 66.2 (145.9)    23 0600 66.3 (146.17)    23 07:49:36 67.6 (149.03)    23 0705 66.7 (147)    23 0600 67 (147.71)        Physical Exam  Vitals reviewed. HENT:      Head: Normocephalic and atraumatic. Eyes:      Conjunctiva/sclera: Conjunctivae normal.      Pupils: Pupils are equal, round, and reactive to light. Cardiovascular:      Rate and Rhythm: Normal rate and regular rhythm. Pulses: Normal pulses. Heart sounds: Normal heart sounds. No murmur heard. No friction rub. No gallop. Pulmonary:      Effort: Pulmonary effort is normal. No respiratory distress (soft breath sounds on the R).       Breath sounds: Normal breath sounds. Abdominal:      General: Abdomen is flat. Bowel sounds are normal.      Palpations: Abdomen is soft. Genitourinary:     Comments: Dupont catheter present  Musculoskeletal:         General: Swelling (2+ B/L LE edema to mid-shin) present. Skin:     General: Skin is warm and dry. Comments: Chest tube in place on the R w/o any surrounding swelling or erythema. Neurological:      General: No focal deficit present. Mental Status: He is alert. Comments: AOx2 to person and place  Resting tremor in R arm       LABORATORY DATA     Labs: I have personally reviewed pertinent reports. Results from last 7 days   Lab Units 07/05/23 0428 07/04/23 0450 07/03/23 0449   WBC Thousand/uL 7.39 11.22* 12.60*   HEMOGLOBIN g/dL 7.4* 7.9* 7.9*   HEMATOCRIT % 22.5* 23.5* 22.9*   PLATELETS Thousands/uL 122* 126* 97*   NEUTROS PCT % 78* 78* 84*   MONOS PCT % 8 7 6   EOS PCT % 1 1 0      Results from last 7 days   Lab Units 07/05/23 0428 07/04/23 0450 07/03/23 0449 07/01/23 0429 06/29/23  0209   POTASSIUM mmol/L 4.1 4.0 4.3   < > 4.8   CHLORIDE mmol/L 100 103 101   < > 104   CO2 mmol/L 30 28 28   < > 28   BUN mg/dL 102* 109* 116*   < > 71*   CREATININE mg/dL 2.54* 2.72* 3.00*   < > 2.75*   CALCIUM mg/dL 8.0* 8.4 8.1*   < > 8.3   ALK PHOS U/L  --   --   --   --  145*   ALT U/L  --   --   --   --  65   AST U/L  --   --   --   --  42    < > = values in this interval not displayed. Results from last 7 days   Lab Units 07/02/23 0423 07/01/23 1219 07/01/23 0429   MAGNESIUM mg/dL 2.4 2.3 2.2     Results from last 7 days   Lab Units 07/02/23 0423 07/01/23 1219 07/01/23 0429   PHOSPHORUS mg/dL 3.5 4.2* 4.4*                    IMAGING & DIAGNOSTIC TESTING     Radiology Results: I have personally reviewed pertinent reports. XR chest portable ICU    Result Date: 7/2/2023  Impression: Pericardial drain with slight decrease in marked enlargement of cardiac silhouette from pericardial effusion.  Persistent small loculated right basilar hydropneumothorax and small left effusion. Persistent right base atelectasis. Workstation performed: [id number]     XR chest portable ICU    Result Date: 7/1/2023  Impression: Persistent small loculated right hydropneumothorax with moderate left effusion and bibasilar atelectasis. Pulmonary artery catheter in right interlobar pulmonary artery, subsequently retracted. Workstation performed: [id number]     XR chest portable ICU    Result Date: 7/1/2023  Impression: Pulmonary artery catheter in main pulmonary artery. Persistent enlargement of the cardiac silhouette with pericardial drain. Right pleural pigtail catheter with stable small loculated right hydropneumothorax and mild bibasilar atelectasis. Workstation performed: [id number]     Other Diagnostic Testing: I have personally reviewed pertinent reports.     ACTIVE MEDICATIONS     Current Facility-Administered Medications   Medication Dose Route Frequency   • acetaminophen (TYLENOL) tablet 650 mg  650 mg Oral Q4H PRN   • ascorbic acid (VITAMIN C) tablet 500 mg  500 mg Oral Daily   • atorvastatin (LIPITOR) tablet 40 mg  40 mg Oral Daily With Dinner   • cholecalciferol (VITAMIN D3) tablet 2,000 Units  2,000 Units Oral Daily   • docusate sodium (COLACE) capsule 100 mg  100 mg Oral BID   • heparin (porcine) subcutaneous injection 5,000 Units  5,000 Units Subcutaneous Q8H [address]   • HYDROmorphone HCl (DILAUDID) injection 0.2 mg  0.2 mg Intravenous Q4H PRN   • insulin glargine (LANTUS) subcutaneous injection 5 Units 0.05 mL  5 Units Subcutaneous HS   • insulin lispro (HumaLOG) 100 units/mL subcutaneous injection 1-5 Units  1-5 Units Subcutaneous TID AC   • insulin lispro (HumaLOG) 100 units/mL subcutaneous injection 1-5 Units  1-5 Units Subcutaneous HS   • insulin lispro (HumaLOG) 100 units/mL subcutaneous injection 5 Units  5 Units Subcutaneous TID With Meals   • latanoprost (XALATAN) 0.005 % ophthalmic solution 1 drop  1 drop Both Eyes HS   • melatonin tablet 6 mg  6 mg Oral HS   • ondansetron (ZOFRAN) injection 4 mg  4 mg Intravenous Once   • ondansetron (ZOFRAN) injection 4 mg  4 mg Intravenous Q4H PRN   • oxyCODONE (ROXICODONE) IR tablet 5 mg  5 mg Oral Q4H PRN   • oxyCODONE (ROXICODONE) split tablet 2.5 mg  2.5 mg Oral Q4H PRN   • polyethylene glycol (MIRALAX) packet 17 g  17 g Oral Daily   • senna (SENOKOT) tablet 17.2 mg  2 tablet Oral HS   • tamsulosin (FLOMAX) capsule 0.4 mg  0.4 mg Oral Daily With Dinner   • timolol (TIMOPTIC) 0.5 % ophthalmic solution 1 drop  1 drop Both Eyes Daily   • torsemide (DEMADEX) tablet 40 mg  40 mg Oral Daily       VTE Pharmacologic Prophylaxis: Heparin  VTE Mechanical Prophylaxis: sequential compression device    Portions of the record may have been created with voice recognition software. Occasional wrong word or "sound a like" substitutions may have occurred due to the inherent limitations of voice recognition software.   Read the chart carefully and recognize, using context, where substitutions have occurred.  ==  Yogi Gamez MD  [address]  Internal Medicine Residency PGY-1

## 2023-07-05 NOTE — ASSESSMENT & PLAN NOTE
• Pneumonia C/F with right lower lobe empyema status post right chest tube  • Chest tube removed by IR on 07/07  • Patient has completed 7-day cefepime course

## 2023-07-05 NOTE — PROGRESS NOTES
Cardiology Progress note. Unit/Bed#: [facility] 402-01 Encounter: [id number]        Inga Cash 78 y.o. male [id number]  Hospital Stay Days: 7    Assessment and Plan      Current Problem List   Principal Problem:    Pericardial effusion  Active Problems:    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 (HCC)    Chronic combined systolic and diastolic congestive heart failure (HCC)    Atrial fibrillation with slow ventricular response (HCC)    Acute kidney injury superimposed on chronic kidney disease (HCC)    Anemia    Urinary retention    Pneumonia    Encephalopathy    Assessment/Plan: This is a 78-year-old male with past medical history of heart failure preserved EF 45%, CKD, type 2 diabetes, atrial fibrillation on Eliquis who initially presented with complicated pneumonia with both pleural and pericardial effusion. Status post chest tube and antibiotics for pleural effusion. S/p pericardiocentesis with significant resolution of his pericardial fusion. Still has loculated pericardial effusion at the apex. # Acute on chronic pericardial fusion with tamponade s/p pericardiocentesis  # Lung empyema status post chest tube placement this admission  # Complicated pneumonia s/p antibiotics this admission  # Atrial fibrillation  # KARINA on CKD  # Type 2 diabetes  # HFpEF 60%  # CAD    Plan:    · 0cc documented output from pericardial drain over last 24hrs, will plan for removal of drain today. · Continue home Eliquis when able for history of A-fib. · Continue home torsemide 40 mg daily since patient still volume overloaded. · Rest of care per primary team.     Subjective     Pt admits to feeling well. Denies any chest pain or shortness of breath, palpitations or abdominal pain. Pericardial drain with 0cc output documented, roughly 5cc noted in the bag bedside.        Objective     Vitals: Temp (24hrs), Av.5 °F (36.4 °C), Min:97.3 °F (36.3 °C), Max:97.6 °F (36.4 °C)  Current: Temperature: (!) 97.3 °F (36.3 °C)  Patient Vitals for the past 24 hrs:   BP Temp Temp src Pulse Resp SpO2 Weight   07/05/23 0828 -- -- -- -- -- -- 65 kg (143 lb 4.8 oz)   07/05/23 0710 124/62 (!) 97.3 °F (36.3 °C) Axillary 73 18 99 % --   07/05/23 0500 -- -- -- -- -- -- 66.2 kg (145 lb 14.4 oz)   07/04/23 2236 127/61 97.6 °F (36.4 °C) -- 93 20 97 % --   07/04/23 2100 -- -- -- -- -- 98 % --   07/04/23 1521 124/52 97.5 °F (36.4 °C) -- 83 -- 95 % --    Body mass index is 21.16 kg/m². Physical Exam:  Physical Exam  Constitutional:       General: He is not in acute distress. Appearance: He is well-developed. He is not diaphoretic. HENT:      Head: Normocephalic and atraumatic. Right Ear: External ear normal.      Left Ear: External ear normal.      Nose: Nose normal.      Mouth/Throat:      Pharynx: No oropharyngeal exudate. Eyes:      General: No scleral icterus. Right eye: No discharge. Left eye: No discharge. Cardiovascular:      Rate and Rhythm: Normal rate. Rhythm irregular. Heart sounds: Normal heart sounds. No murmur heard. No friction rub. No gallop. Pulmonary:      Effort: Pulmonary effort is normal. No respiratory distress. Breath sounds: No stridor. No wheezing or rales. Comments: Chest tube in place  Abdominal:      General: Bowel sounds are normal. There is no distension. Palpations: Abdomen is soft. Tenderness: There is no abdominal tenderness. There is no guarding. Musculoskeletal:         General: No swelling or deformity. Normal range of motion. Cervical back: Normal range of motion and neck supple. Right lower leg: Edema present. Left lower leg: Edema present. Comments: 2+ bilateral pitting edema of lower extremties   Skin:     General: Skin is warm. Coloration: Skin is not jaundiced. Findings: No bruising, erythema or lesion. Neurological:      Mental Status: He is alert.          Invasive Devices     Peripheral Intravenous Line  Duration           Long-Dwell Peripheral IV (Midline) 04/27/54 Right Basilic 10 days          Drain  Duration           Chest Tube Right 10.2 Fr.  10 days    Urethral Catheter 16 Fr. 7 days    Closed/Suction Drain Pericardial Other (Comment) 6 Fr. 4 days                    Labs:   Results from last 7 days   Lab Units 07/05/23 0428 07/04/23 0450 07/03/23  0449 07/02/23  0423 07/01/23  0429 06/29/23  0209   WBC Thousand/uL 7.39 11.22* 12.60* 12.56* 9.88 7.83   HEMOGLOBIN g/dL 7.4* 7.9* 7.9* 8.7* 9.5* 11.4*   HEMATOCRIT % 22.5* 23.5* 22.9* 26.4* 29.0* 34.6*   PLATELETS Thousands/uL 122* 126* 97* 81* 49* 55*   NEUTROS PCT % 78* 78* 84* 86* 86* 69   MONOS PCT % 8 7 6 6 6 12   EOS PCT % 1 1 0 0 0 1      Results from last 7 days   Lab Units 07/05/23 0428 07/04/23 0450 07/03/23 0449 07/02/23  0423 07/01/23  1219 07/01/23  0429 06/29/23  0209   SODIUM mmol/L 133* 137 134* 134* 135 137 135   POTASSIUM mmol/L 4.1 4.0 4.3 4.8 4.7 4.8 4.8   CHLORIDE mmol/L 100 103 101 101 101 105 104   CO2 mmol/L 30 28 28 29 24 22 28   BUN mg/dL 102* 109* 116* 92* 80* 71* 71*   CREATININE mg/dL 2.54* 2.72* 3.00* 2.99* 3.29* 2.69* 2.75*   CALCIUM mg/dL 8.0* 8.4 8.1* 8.4 8.8 8.2* 8.3   ALK PHOS U/L  --   --   --   --   --   --  145*   ALT U/L  --   --   --   --   --   --  65   AST U/L  --   --   --   --   --   --  42   MAGNESIUM mg/dL  --   --   --  2.4 2.3 2.2  --    PHOSPHORUS mg/dL  --   --   --  3.5 4.2* 4.4*  --    EGFR ml/min/1.73sq m 22 20 18 18 16 20 20                 No results found for: "PHART", "FKL0WIN", "PO2ART", "RIU1YTC", "W4VGDIXJ", "BEART", "SOURCE"  No components found for: "HIV1X2"  No results found for: "HAV", "HEPAIGM", "HEPBIGM", "HEPBCAB", "HBEAG", "HEPCAB"  Lab Results   Component Value Date    SPEP See Comment 02/27/2023    UPEP See Comment 02/26/2023      Lab Results   Component Value Date    HGBA1C 8.3 (H) 02/23/2023    HGBA1C 8.5 10/18/2022    HGBA1C 7.1 02/04/2021 HGBA1C 7.1 02/04/2021     No results found for: "CHOL"   Lab Results   Component Value Date    HDL 37 (A) 08/20/2021    HDL 34 (L) 01/15/2019      Lab Results   Component Value Date    LDLCALC 54 01/15/2019      Lab Results   Component Value Date    TRIG 63 08/20/2021    TRIG 70 01/15/2019     No components found for: "PROCAL"      Micro:  Results from last 7 days   Lab Units 06/30/23  1341   GRAM STAIN RESULT  No Polys or Bacteria seen   BODY FLUID CULTURE, STERILE  No growth     Urinalysis:  No results found for: "AMPHETUR", "BDZUR", "COCAINEUR", "OPIATEUR", "PCPUR", "Hayden Collie", "ETOH", "ACTMNPHEN", "SALICYLATE"       Invalid input(s): "URIBILINOGEN"        Intake and Outputs:  I/O       06/30 0701  07/01 0700 07/01 0701  07/02 0700 07/02 0701 07/03 0700    P. O. 480 1620 354    I.V. (mL/kg)  30 (0.5)     NG/GT  30     IV Piggyback 480 100     Total Intake(mL/kg) 960 (13.7) 1780 (26.7) 354 (5.3)    Urine (mL/kg/hr) 1138 (0.7) 1010 (0.6) 300 (1)    Drains 860 300     Stool  0 0    Chest Tube 220 180 0    Total Output 2218 1490 300    Net -1258 +290 +54           Unmeasured Stool Occurrence  1 x 1 x        Nutrition:  Diet Cardiovascular; Sodium 2 GM; Fluid Restriction 1500 ML, Consistent Carbohydrate Diet Level 1 (4 carb servings/60 grams CHO/meal)  Radiology Results:   XR chest portable ICU   Final Result by Laura Tariq MD (07/01 1148)      Pulmonary artery catheter in main pulmonary artery. Persistent enlargement of the cardiac silhouette with pericardial drain. Right pleural pigtail catheter with stable small loculated right hydropneumothorax and mild bibasilar atelectasis. Workstation performed: [id number]         XR chest portable ICU   Final Result by Laura Tariq MD (07/01 1611)      Persistent small loculated right hydropneumothorax with moderate left effusion and bibasilar atelectasis.       Pulmonary artery catheter in right interlobar pulmonary artery, subsequently retracted. Workstation performed: [id number]         XR chest portable ICU   Final Result by Claudia Moore MD (07/02 1908)      Pericardial drain with slight decrease in marked enlargement of cardiac silhouette from pericardial effusion. Persistent small loculated right basilar hydropneumothorax and small left effusion. Persistent right base atelectasis.                Workstation performed: [id number]           Scheduled Medications:  ascorbic acid, 500 mg, Daily  atorvastatin, 40 mg, Daily With Dinner  cholecalciferol, 2,000 Units, Daily  docusate sodium, 100 mg, BID  heparin (porcine), 5,000 Units, Q8H [address]  insulin glargine, 5 Units, HS  insulin lispro, 1-5 Units, TID AC  insulin lispro, 1-5 Units, HS  insulin lispro, 5 Units, TID With Meals  latanoprost, 1 drop, HS  melatonin, 6 mg, HS  ondansetron, 4 mg, Once  polyethylene glycol, 17 g, Daily  senna, 2 tablet, HS  tamsulosin, 0.4 mg, Daily With Dinner  timolol, 1 drop, Daily  torsemide, 40 mg, Daily      PRN MEDS:  acetaminophen, 650 mg, Q4H PRN  HYDROmorphone, 0.2 mg, Q4H PRN  ondansetron, 4 mg, Q4H PRN  oxyCODONE, 5 mg, Q4H PRN  oxyCODONE, 2.5 mg, Q4H PRN      Last 24 Hour Meds: :   Medication Administration - last 24 hours from 07/04/2023 1219 to 07/05/2023 1219       Date/Time Order Dose Route Action Action by     07/05/2023 0829 EDT ascorbic acid (VITAMIN C) tablet 500 mg 500 mg Oral Given Kinsey Iglesias RN     07/04/2023 1646 EDT atorvastatin (LIPITOR) tablet 40 mg 40 mg Oral Given Kinsey Iglesias RN     07/05/2023 6517 EDT cholecalciferol (VITAMIN D3) tablet 2,000 Units 2,000 Units Oral Given Kinsey Iglesias RN     07/05/2023 2738 EDT docusate sodium (COLACE) capsule 100 mg 100 mg Oral Not Given Kinsey Iglesias RN     07/04/2023 1704 EDT docusate sodium (COLACE) capsule 100 mg 100 mg Oral Not Given Kinsey Iglesias RN     07/04/2023 2228 EDT latanoprost (XALATAN) 0.005 % ophthalmic solution 1 drop 1 drop Both Eyes Given Summer Lozada     07/05/2023 0834 EDT polyethylene glycol (MIRALAX) packet 17 g 17 g Oral Not Given Kennedy Gross RN     07/04/2023 2200 EDT senna (SENOKOT) tablet 17.2 mg 0 mg Oral Hold Ruma ChaparroROBERTO     07/04/2023 1646 EDT tamsulosin (FLOMAX) capsule 0.4 mg 0.4 mg Oral Given Kennedy Gross RN     07/05/2023 1799 EDT timolol (TIMOPTIC) 0.5 % ophthalmic solution 1 drop 1 drop Both Eyes Given Kennedy Gross RN     07/05/2023 1959 EDT torsemide (DEMADEX) tablet 40 mg 40 mg Oral Given Kennedy Gross RN     07/04/2023 2227 EDT melatonin tablet 6 mg 6 mg Oral Given Summer Lozada     07/05/2023 1112 EDT insulin lispro (HumaLOG) 100 units/mL subcutaneous injection 1-5 Units 2 Units Subcutaneous Given Kennedy Gross RN     07/05/2023 4178 EDT insulin lispro (HumaLOG) 100 units/mL subcutaneous injection 1-5 Units -- Subcutaneous Not Given Summer Lozada     07/04/2023 1646 EDT insulin lispro (HumaLOG) 100 units/mL subcutaneous injection 1-5 Units 1 Units Subcutaneous Given Kennedy Gross RN     07/04/2023 2227 EDT insulin lispro (HumaLOG) 100 units/mL subcutaneous injection 1-5 Units 2 Units Subcutaneous Given Summer Lozada     07/04/2023 2227 EDT insulin glargine (LANTUS) subcutaneous injection 5 Units 0.05 mL 5 Units Subcutaneous Given Bibi Bass     07/05/2023 1112 EDT insulin lispro (HumaLOG) 100 units/mL subcutaneous injection 5 Units 5 Units Subcutaneous Given Kennedy Gross RN     07/05/2023 0729 EDT insulin lispro (HumaLOG) 100 units/mL subcutaneous injection 5 Units 5 Units Subcutaneous Not Given Kennedy Gross RN     07/04/2023 1646 EDT insulin lispro (HumaLOG) 100 units/mL subcutaneous injection 5 Units 5 Units Subcutaneous Given Kennedy Gross RN     07/05/2023 0525 EDT heparin (porcine) subcutaneous injection 5,000 Units 5,000 Units Subcutaneous Given Summer Lozada     07/04/2023 2227 EDT heparin (porcine) subcutaneous injection 5,000 Units 5,000 Units Subcutaneous Given Orpah Milder     07/04/2023 1510 EDT heparin (porcine) subcutaneous injection 5,000 Units 5,000 Units Subcutaneous Given Lorena Lopez RN     07/05/2023 7204 EDT oxyCODONE (ROXICODONE) split tablet 2.5 mg 2.5 mg Oral Given Orpah Milder     07/05/2023 0829 EDT oxyCODONE (ROXICODONE) IR tablet 5 mg 5 mg Oral Given Lorena Lopez RN          PLEASE NOTE:  This encounter was completed utilizing the [employer] Direct Speech Voice Recognition Software. Grammatical errors, random word insertions, pronoun errors and incomplete sentences are occasional consequences of the system due to software limitations, ambient noise and hardware issues. These may be missed by proof reading prior to affixing electronic signature. Any questions or concerns about the content, text or information contained within the body of this dictation should be directly addressed to the physician for clarification. Please do not hesitate to call me directly if you have any any questions or concerns.

## 2023-07-05 NOTE — ASSESSMENT & PLAN NOTE
Wt Readings from Last 3 Encounters:   07/10/23 64.9 kg (143 lb 1.3 oz)   06/28/23 74.5 kg (164 lb 3.9 oz)   05/11/23 66.7 kg (147 lb)     • Diuretics held on admission in the setting of shock and KARINA  • Torsemide 40 mg daily restarted per cardiology  • 2.8L diuresis and net negative, exam still with 2+ pitting edema on 07/06  • Pitting edema significantly improved on 07/07  • Appears to be back to baseline on exam 07/10  • I/O, daily weight, low-sodium diet with fluid restriction

## 2023-07-05 NOTE — ASSESSMENT & PLAN NOTE
• Hemoglobin 11.0 present on admission  • Platelet count with improvement at 126  • Hgb decreased from 8.7-7.9 x 2 -> 7.2 -> 7.1 -> 7.5 -> 7.8  • Blood consent obtained  • Obtained FOBT due to reported concern black stools by nurse on 07/07  • FOBT returned positive, EGD performed, found duodenal ulcer  • Likely source of bleed  • Continue trending CBC

## 2023-07-05 NOTE — ASSESSMENT & PLAN NOTE
Lab Results   Component Value Date    HGBA1C 8.3 (H) 02/23/2023       Recent Labs     07/10/23  1128 07/10/23  1640 07/10/23  2105 07/11/23  0612   POCGLU 161* 126 282* 230*       Blood Sugar Average: Last 72 hrs:  (P) 183     • Added 10 units Lantus at at bedtime on 7/3/2023, hypoglycemic this morning, decrease to Lantus 5 units  • Added 5 units lispro 3 times daily with meals on 7/3/2023  • Continue sliding scale insulin algorithm 3

## 2023-07-05 NOTE — ASSESSMENT & PLAN NOTE
• Was holding Eliquis d/t downtrending Hgb  • FOBT positive, with EGD findings of duodenal ulcer with clean base, likely cause of anemia  • Restarted Eliquis 2.5 mg BID home dose, per GI

## 2023-07-06 ENCOUNTER — APPOINTMENT (INPATIENT)
Dept: NON INVASIVE DIAGNOSTICS | Facility: HOSPITAL | Age: 83
DRG: 314 | End: 2023-07-06
Payer: MEDICARE

## 2023-07-06 LAB
ANION GAP SERPL CALCULATED.3IONS-SCNC: 5 MMOL/L
AORTIC ROOT: 2.9 CM
APICAL FOUR CHAMBER EJECTION FRACTION: 46 %
BASOPHILS # BLD AUTO: 0.01 THOUSANDS/ÂΜL (ref 0–0.1)
BASOPHILS NFR BLD AUTO: 0 % (ref 0–1)
BUN SERPL-MCNC: 99 MG/DL (ref 5–25)
CALCIUM SERPL-MCNC: 8 MG/DL (ref 8.3–10.1)
CHLORIDE SERPL-SCNC: 99 MMOL/L (ref 96–108)
CO2 SERPL-SCNC: 29 MMOL/L (ref 21–32)
CREAT SERPL-MCNC: 2.41 MG/DL (ref 0.6–1.3)
EOSINOPHIL # BLD AUTO: 0.09 THOUSAND/ÂΜL (ref 0–0.61)
EOSINOPHIL NFR BLD AUTO: 1 % (ref 0–6)
ERYTHROCYTE [DISTWIDTH] IN BLOOD BY AUTOMATED COUNT: 15.3 % (ref 11.6–15.1)
FRACTIONAL SHORTENING: 18 (ref 28–44)
GFR SERPL CREATININE-BSD FRML MDRD: 23 ML/MIN/1.73SQ M
GLUCOSE SERPL-MCNC: 136 MG/DL (ref 65–140)
GLUCOSE SERPL-MCNC: 193 MG/DL (ref 65–140)
GLUCOSE SERPL-MCNC: 242 MG/DL (ref 65–140)
GLUCOSE SERPL-MCNC: 64 MG/DL (ref 65–140)
GLUCOSE SERPL-MCNC: 81 MG/DL (ref 65–140)
HCT VFR BLD AUTO: 23.1 % (ref 36.5–49.3)
HGB BLD-MCNC: 7.2 G/DL (ref 12–17)
IMM GRANULOCYTES # BLD AUTO: 0.02 THOUSAND/UL (ref 0–0.2)
IMM GRANULOCYTES NFR BLD AUTO: 0 % (ref 0–2)
INTERVENTRICULAR SEPTUM IN DIASTOLE (PARASTERNAL SHORT AXIS VIEW): 1.3 CM
INTERVENTRICULAR SEPTUM: 1.3 CM (ref 0.6–1.1)
LEFT ATRIUM SIZE: 5.4 CM
LEFT INTERNAL DIMENSION IN SYSTOLE: 3.6 CM (ref 2.1–4)
LEFT VENTRICULAR INTERNAL DIMENSION IN DIASTOLE: 4.4 CM (ref 3.5–6)
LEFT VENTRICULAR POSTERIOR WALL IN END DIASTOLE: 1.2 CM
LEFT VENTRICULAR STROKE VOLUME: 35 ML
LVSV (TEICH): 35 ML
LYMPHOCYTES # BLD AUTO: 1.21 THOUSANDS/ÂΜL (ref 0.6–4.47)
LYMPHOCYTES NFR BLD AUTO: 18 % (ref 14–44)
MCH RBC QN AUTO: 33.3 PG (ref 26.8–34.3)
MCHC RBC AUTO-ENTMCNC: 31.2 G/DL (ref 31.4–37.4)
MCV RBC AUTO: 107 FL (ref 82–98)
MONOCYTES # BLD AUTO: 0.62 THOUSAND/ÂΜL (ref 0.17–1.22)
MONOCYTES NFR BLD AUTO: 9 % (ref 4–12)
NEUTROPHILS # BLD AUTO: 4.64 THOUSANDS/ÂΜL (ref 1.85–7.62)
NEUTS SEG NFR BLD AUTO: 72 % (ref 43–75)
NRBC BLD AUTO-RTO: 0 /100 WBCS
PLATELET # BLD AUTO: 139 THOUSANDS/UL (ref 149–390)
PMV BLD AUTO: 10.1 FL (ref 8.9–12.7)
POTASSIUM SERPL-SCNC: 3.9 MMOL/L (ref 3.5–5.3)
RBC # BLD AUTO: 2.16 MILLION/UL (ref 3.88–5.62)
SL CV PED ECHO LEFT VENTRICLE DIASTOLIC VOLUME (MOD BIPLANE) 2D: 90 ML
SL CV PED ECHO LEFT VENTRICLE SYSTOLIC VOLUME (MOD BIPLANE) 2D: 55 ML
SODIUM SERPL-SCNC: 133 MMOL/L (ref 135–147)
TR MAX PG: 19 MMHG
TR PEAK VELOCITY: 2.2 M/S
TRICUSPID VALVE PEAK REGURGITATION VELOCITY: 2.19 M/S
WBC # BLD AUTO: 6.59 THOUSAND/UL (ref 4.31–10.16)

## 2023-07-06 PROCEDURE — 82948 REAGENT STRIP/BLOOD GLUCOSE: CPT

## 2023-07-06 PROCEDURE — 97530 THERAPEUTIC ACTIVITIES: CPT

## 2023-07-06 PROCEDURE — 93325 DOPPLER ECHO COLOR FLOW MAPG: CPT

## 2023-07-06 PROCEDURE — 88305 TISSUE EXAM BY PATHOLOGIST: CPT | Performed by: PATHOLOGY

## 2023-07-06 PROCEDURE — 85025 COMPLETE CBC W/AUTO DIFF WBC: CPT

## 2023-07-06 PROCEDURE — 93308 TTE F-UP OR LMTD: CPT

## 2023-07-06 PROCEDURE — 88112 CYTOPATH CELL ENHANCE TECH: CPT | Performed by: PATHOLOGY

## 2023-07-06 PROCEDURE — 99232 SBSQ HOSP IP/OBS MODERATE 35: CPT | Performed by: INTERNAL MEDICINE

## 2023-07-06 PROCEDURE — 93308 TTE F-UP OR LMTD: CPT | Performed by: INTERNAL MEDICINE

## 2023-07-06 PROCEDURE — 97116 GAIT TRAINING THERAPY: CPT

## 2023-07-06 PROCEDURE — 93321 DOPPLER ECHO F-UP/LMTD STD: CPT

## 2023-07-06 PROCEDURE — 93321 DOPPLER ECHO F-UP/LMTD STD: CPT | Performed by: INTERNAL MEDICINE

## 2023-07-06 PROCEDURE — 93325 DOPPLER ECHO COLOR FLOW MAPG: CPT | Performed by: INTERNAL MEDICINE

## 2023-07-06 PROCEDURE — 97535 SELF CARE MNGMENT TRAINING: CPT

## 2023-07-06 PROCEDURE — 80048 BASIC METABOLIC PNL TOTAL CA: CPT

## 2023-07-06 RX ADMIN — INSULIN LISPRO 1 UNITS: 100 INJECTION, SOLUTION INTRAVENOUS; SUBCUTANEOUS at 12:02

## 2023-07-06 RX ADMIN — TAMSULOSIN HYDROCHLORIDE 0.4 MG: 0.4 CAPSULE ORAL at 16:58

## 2023-07-06 RX ADMIN — OXYCODONE HYDROCHLORIDE AND ACETAMINOPHEN 500 MG: 500 TABLET ORAL at 08:08

## 2023-07-06 RX ADMIN — ATORVASTATIN CALCIUM 40 MG: 40 TABLET, FILM COATED ORAL at 16:58

## 2023-07-06 RX ADMIN — TIMOLOL MALEATE 1 DROP: 5 SOLUTION/ DROPS OPHTHALMIC at 08:09

## 2023-07-06 RX ADMIN — TORSEMIDE 40 MG: 20 TABLET ORAL at 08:08

## 2023-07-06 RX ADMIN — INSULIN GLARGINE 5 UNITS: 100 INJECTION, SOLUTION SUBCUTANEOUS at 21:17

## 2023-07-06 RX ADMIN — HEPARIN SODIUM 5000 UNITS: 5000 INJECTION INTRAVENOUS; SUBCUTANEOUS at 05:58

## 2023-07-06 RX ADMIN — INSULIN LISPRO 2 UNITS: 100 INJECTION, SOLUTION INTRAVENOUS; SUBCUTANEOUS at 21:18

## 2023-07-06 RX ADMIN — LATANOPROST 1 DROP: 50 SOLUTION OPHTHALMIC at 21:20

## 2023-07-06 RX ADMIN — INSULIN LISPRO 5 UNITS: 100 INJECTION, SOLUTION INTRAVENOUS; SUBCUTANEOUS at 08:09

## 2023-07-06 RX ADMIN — MELATONIN TAB 3 MG 6 MG: 3 TAB at 21:20

## 2023-07-06 RX ADMIN — INSULIN LISPRO 5 UNITS: 100 INJECTION, SOLUTION INTRAVENOUS; SUBCUTANEOUS at 12:02

## 2023-07-06 RX ADMIN — HEPARIN SODIUM 5000 UNITS: 5000 INJECTION INTRAVENOUS; SUBCUTANEOUS at 21:18

## 2023-07-06 RX ADMIN — Medication 2000 UNITS: at 08:08

## 2023-07-06 RX ADMIN — HEPARIN SODIUM 5000 UNITS: 5000 INJECTION INTRAVENOUS; SUBCUTANEOUS at 13:10

## 2023-07-06 NOTE — PLAN OF CARE
Problem: OCCUPATIONAL THERAPY ADULT  Goal: Performs self-care activities at highest level of function for planned discharge setting. See evaluation for individualized goals. Description: Treatment Interventions: ADL retraining, Functional transfer training, Endurance training, Cognitive reorientation, Patient/family training, Equipment evaluation/education, Compensatory technique education, Energy conservation          See flowsheet documentation for full assessment, interventions and recommendations. Outcome: Progressing  Note: Limitation: Decreased ADL status, Decreased Safe judgement during ADL, Decreased cognition, Decreased endurance, Decreased self-care trans, Decreased high-level ADLs  Prognosis: Good  Assessment: Pt is a 77 yo male who actively participated in skilled OT session on 7/6/2023. Pt seen with PT to increase safety, decrease fall risk, and maximize functional/occupational performance 2* medical complexity which is a regression from pt's functional baseline. Treatment focused to improve functional transfers with fall prevention strategies, static/dynamic balance, postural/trunk control, proper body mechanics, functional use of b/l UE's, higher level cognitive functions, safety awareness, and overall increased activity tolerance in ADL/IADL/leisure tasks. Upon arrival, pt found lying supine in bed and was agreeable to OT session. Pt performed supine <> sit with Min A x1 for UB postural support and completed STS w/ Min A x1 w/ RW. In standing, pt incontinent of stool, requiring Max A for posterior hygiene. Pt completed short household functional mobility distances w/ Min A x1 w/ RW for safety and support with chair follow in which pt required 1 seated rest break 2* increased fatigue/generalized weakness. In seated position, pt completed LB dressing tasks w/ Max A and UB dressing tasks w Mod A. At the end of the session, pt was left lying supine in bed with all functional needs in reach.  Pt demonstrates gradual functional improvements towards OT goals however continues to be functioning below occupational baseline and is still limited by the following limitations/impairments which were addressed through skilled instruction: cognition, generalized weakness, balance, endurance/activity tolerance, postural/trunk control, strength, pain, and safety awareness. At this time, recommend discharge to post-acute rehab when medically stable. The patient's raw score on the -PAC Daily Activity Inpatient Short Form is 16. A raw score of less than 19 suggests the patient may benefit from discharge to post-acute rehabilitation services. Please refer to the recommendation of the Occupational Therapist for safe discharge planning. Established OT goals will be continued 2-3x/wk to address immediate acute care needs and underlying performance skills to maximize occupational performance and safety to return to PLOF.      OT Discharge Recommendation: Post acute rehabilitation services

## 2023-07-06 NOTE — PHYSICAL THERAPY NOTE
PHYSICAL THERAPY NOTE          Patient Name: Ana PATRICKX'Q Date: 7/6/2023 07/06/23 1018   PT Last Visit   PT Visit Date 07/06/23   Note Type   Note Type Treatment   Pain Assessment   Pain Assessment Tool 0-10   Pain Score 5   Pain Location/Orientation Location: Generalized   Pain Onset/Description Onset: Ongoing   Effect of Pain on Daily Activities limited mobility   Patient's Stated Pain Goal No pain   Hospital Pain Intervention(s) Repositioned; Ambulation/increased activity; Emotional support   Restrictions/Precautions   Weight Bearing Precautions Per Order No   Other Precautions Cardiac/sternal;Cognitive;Multiple lines;Telemetry; Fall Risk;Pain  (CT)   General   Chart Reviewed Yes   Family/Caregiver Present No   Cognition   Overall Cognitive Status Impaired   Arousal/Participation Responsive;Arousable; Cooperative   Attention Attends with cues to redirect   Orientation Level Oriented to person;Oriented to place; Disoriented to time;Disoriented to situation   Memory Decreased recall of precautions   Following Commands Follows one step commands with increased time or repetition   Comments Patient pleasant and cooperative. Patient with slow processing requiring cues to redirect and cues for safety. Decreased overall insight into deficits. Subjective   Subjective Patient agreeable to therapy   Bed Mobility   Supine to Sit 4  Minimal assistance   Additional items Assist x 1; Increased time required;Verbal cues;HOB elevated;LE management   Sit to Supine 4  Minimal assistance   Additional items Assist x 1; Increased time required;Verbal cues;LE management   Additional Comments Patient sits EOB with Supervision-Min A for postural support ; able to perform lateral scooting at EOB to improve positoning toward [facility] ; Min A to return to supine   Transfers   Sit to Stand 4  Minimal assistance   Additional items Assist x 1; Increased time required;Verbal cues   Stand to Sit 4  Minimal assistance   Additional items Assist x 1; Increased time required;Verbal cues   Additional Comments RW with cues for safety ; in standing, pt incontinent of stool   Ambulation/Elevation   Gait pattern N[place]; Forward Flexion;Decreased foot clearance;Shuffling; Short stride; Excessively slow   Gait Assistance 4  Minimal assist   Additional items Assist x 1;Verbal cues   Assistive Device Rolling walker   Distance 20'x2   Balance   Static Sitting Fair   Dynamic Sitting Fair -   Static Standing Poor +   Dynamic Standing Poor   Ambulatory Poor   Endurance Deficit   Endurance Deficit Yes   Activity Tolerance   Activity Tolerance Patient limited by fatigue   Medical Staff Made Aware ROSSANA Kothari   Nurse Made Aware RN cleared   Assessment   Prognosis Fair   Problem List Decreased strength;Decreased endurance; Impaired balance;Decreased mobility; Decreased coordination;Decreased cognition;Decreased safety awareness;Pain; Impaired judgement   Assessment Patient received in bed. Patient agreeable to therapy. Patient performs bed mobility with Min A. Patient able to sit EOB with fluctuating need for assist Supervision-Min A for trunk stability and upright positioning. Patient performs functional transfers with Min A. Patient performs ambulation with Min A using RW, 20'x2. Following ambulation, patient requests return to bed due to fatigue. Patient performs lateral scoots at EOB toward [facility] for improved positioning, but requires Min A to return to supine positioning. Patient left in bed with all needs met and call bell/personal items within reach. The patient's AM-PAC Basic Mobility Inpatient Short Form Raw Score is 15 showing further need for skilled PT services in order to improve functional mobility, decrease need for assistance, and return to PLOF. A Raw score of less than 16 suggests the patient may benefit from discharge to post-acute rehabilitation services.  PT continues to recommend rehab on d/c. Will continue to follow as able. Barriers to Discharge None   Goals   Patient Goals to feel better   Plan   Treatment/Interventions ADL retraining;Functional transfer training;LE strengthening/ROM; Therapeutic exercise; Endurance training;Patient/family training;Bed mobility;Cognitive reorientation;Equipment eval/education;Gait training;Spoke to nursing;Spoke to case management;OT   Progress Slow progress, decreased activity tolerance   PT Frequency 3-5x/wk   Recommendation   PT Discharge Recommendation Post acute rehabilitation services   Equipment Recommended [facility] Recommended Wheeled walker   AM-PAC Basic Mobility Inpatient   Turning in Flat Bed Without Bedrails 3   Lying on Back to Sitting on Edge of Flat Bed Without Bedrails 3   Moving Bed to Chair 3   Standing Up From Chair Using Arms 3   Walk in Room 2   Climb 3-5 Stairs With Railing 1   Basic Mobility Inpatient Raw Score 15   Basic Mobility Standardized Score 36.97   Highest Level Of Mobility   JH-HLM Goal 4: Move to chair/commode   JH-HLM Achieved 6: Walk 10 steps or more   End of Consult   Patient Position at End of Consult Supine; All needs within reach     Brenda Nicole, PT, DPT

## 2023-07-06 NOTE — RESTORATIVE TECHNICIAN NOTE
Restorative Technician Note      Patient Name: Sharath Diallo Tech Visit Date: 07/06/23  Note Type: Mobility  Patient Position Upon Consult: Bedside chair  Activity Performed: Transferred  Assistive Device: Other (Comment) (HHA x 1)  Patient Position at End of Consult: All needs within reach; Bed/Chair alarm activated; Supine;  Other (comment) (for echo)

## 2023-07-06 NOTE — RESTORATIVE TECHNICIAN NOTE
Restorative Technician Note      Patient Name: Deborah Lu     Restorative Tech Visit Date: 07/06/23  Note Type: Mobility  Patient Position Upon Consult: Supine  Activity Performed: Ambulated  Assistive Device: Roller walker  Patient Position at End of Consult: All needs within reach;  Bedside chair

## 2023-07-06 NOTE — PROGRESS NOTES
Cardiology Progress note. Unit/Bed#: [facility] 402-01 Encounter: [id number]        Jordan Roy 78 y.o. male [id number]  Hospital Stay Days: 8    Assessment and Plan      Current Problem List   Principal Problem:    Pericardial effusion  Active Problems:    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 (HCC)    Chronic combined systolic and diastolic congestive heart failure (HCC)    Atrial fibrillation with slow ventricular response (HCC)    Acute kidney injury superimposed on chronic kidney disease ([address])    Anemia    Urinary retention    Pneumonia    Encephalopathy    Assessment/Plan: This is a 78-year-old male with past medical history of heart failure preserved EF 45%, CKD, type 2 diabetes, atrial fibrillation on Eliquis who initially presented with complicated pneumonia with both pleural and pericardial effusion. Status post chest tube and antibiotics for pleural effusion. S/p pericardiocentesis with significant resolution of his pericardial fusion. Still has loculated pericardial effusion at the apex. # Acute on chronic pericardial fusion with tamponade s/p pericardiocentesis and pericardial drain placement 6/30 with subsequent drain removal 7/5  # Lung empyema status post chest tube placement this admission  # Complicated pneumonia s/p antibiotics this admission  # Atrial fibrillation  # KARINA on CKD  # Type 2 diabetes  # HFpEF 60%  # CAD  # Anemia    Plan:    · Pericardial drain removed 7/5 with no incident  · Repeat limited echo today, if pericardial fluid is reaccumulating then will likely require pericardial window   · Patient with anemia this admission, continue home Eliquis when able for history of A-fib. · Continue home torsemide 40 mg daily for now, may reduce dose tomorrow  · Rest of care per primary team.      Subjective     Pt feels well, no complaints. Denies any chest pain or shortness of breath, palpitations or abdominal pain.  Site of pericardial drain is bandaged with no signs of bleeding. Objective     Vitals: Temp (24hrs), Av.3 °F (36.3 °C), Min:96.9 °F (36.1 °C), Max:97.6 °F (36.4 °C)  Current: Temperature: (!) 96.9 °F (36.1 °C)  Patient Vitals for the past 24 hrs:   BP Temp Pulse Resp SpO2 Weight   23 0748 -- -- -- -- -- 65 kg (143 lb 4.8 oz)   23 0728 (!) 129/103 (!) 96.9 °F (36.1 °C) 88 17 98 % --   23 0600 -- -- -- -- -- 65.7 kg (144 lb 13.5 oz)   23 2341 (!) 87/52 97.6 °F (36.4 °C) 90 18 98 % --   23 1545 131/64 -- 91 -- 99 % --   23 1537 131/64 -- 93 -- 99 % --   23 1515 131/65 -- 92 -- 99 % --   23 1500 127/58 -- 89 -- 100 % --   23 0828 -- -- -- -- -- 65 kg (143 lb 4.8 oz)    Body mass index is 21.16 kg/m². Physical Exam:  Physical Exam  Constitutional:       General: He is not in acute distress. Appearance: He is well-developed. He is not toxic-appearing or diaphoretic. HENT:      Head: Normocephalic and atraumatic. Right Ear: External ear normal.      Left Ear: External ear normal.      Nose: Nose normal.      Mouth/Throat:      Pharynx: No oropharyngeal exudate. Eyes:      General: No scleral icterus. Right eye: No discharge. Left eye: No discharge. Cardiovascular:      Rate and Rhythm: Normal rate. Rhythm irregular. Heart sounds: Normal heart sounds. No murmur heard. No friction rub. No gallop. Pulmonary:      Effort: Pulmonary effort is normal. No respiratory distress. Breath sounds: No stridor. No wheezing or rales. Comments: Chest tube in place  Abdominal:      General: Bowel sounds are normal. There is no distension. Palpations: Abdomen is soft. Tenderness: There is no abdominal tenderness. There is no guarding. Musculoskeletal:         General: No swelling or deformity. Normal range of motion. Cervical back: Normal range of motion and neck supple. Right lower leg: Edema present.       Left lower leg: Edema present. Comments: 2+ bilateral pitting edema of lower extremties   Skin:     General: Skin is warm. Coloration: Skin is not jaundiced. Findings: No bruising, erythema or lesion. Neurological:      Mental Status: He is alert. Motor: Weakness (generalized) present.       Comments: AOx2         Invasive Devices     Peripheral Intravenous Line  Duration           Long-Dwell Peripheral IV (Midline) 06/81/95 Right Basilic 11 days          Drain  Duration           Chest Tube Right 10.2 Fr. 11 days    Urethral Catheter 16 Fr. 8 days                    Labs:   Results from last 7 days   Lab Units 07/06/23 0419 07/05/23 0428 07/04/23  0450 07/03/23  0449 07/02/23  0423 07/01/23  0429   WBC Thousand/uL 6.59 7.39 11.22* 12.60* 12.56* 9.88   HEMOGLOBIN g/dL 7.2* 7.4* 7.9* 7.9* 8.7* 9.5*   HEMATOCRIT % 23.1* 22.5* 23.5* 22.9* 26.4* 29.0*   PLATELETS Thousands/uL 139* 122* 126* 97* 81* 49*   NEUTROS PCT % 72 78* 78* 84* 86* 86*   MONOS PCT % 9 8 7 6 6 6   EOS PCT % 1 1 1 0 0 0      Results from last 7 days   Lab Units 07/06/23 0419 07/05/23 0428 07/04/23  0450 07/03/23  0449 07/02/23  0423 07/01/23  1219 07/01/23  0429   SODIUM mmol/L 133* 133* 137 134* 134* 135 137   POTASSIUM mmol/L 3.9 4.1 4.0 4.3 4.8 4.7 4.8   CHLORIDE mmol/L 99 100 103 101 101 101 105   CO2 mmol/L 29 30 28 28 29 24 22   BUN mg/dL 99* 102* 109* 116* 92* 80* 71*   CREATININE mg/dL 2.41* 2.54* 2.72* 3.00* 2.99* 3.29* 2.69*   CALCIUM mg/dL 8.0* 8.0* 8.4 8.1* 8.4 8.8 8.2*   MAGNESIUM mg/dL  --   --   --   --  2.4 2.3 2.2   PHOSPHORUS mg/dL  --   --   --   --  3.5 4.2* 4.4*   EGFR ml/min/1.73sq m 23 22 20 18 18 16 20                 No results found for: "PHART", "CMF4ZOF", "PO2ART", "XVQ6ENI", "X2HSRLOK", "BEART", "SOURCE"  No components found for: "HIV1X2"  No results found for: "HAV", "HEPAIGM", "HEPBIGM", "HEPBCAB", "HBEAG", "HEPCAB"  Lab Results   Component Value Date    SPEP See Comment 02/27/2023    UPEP See Comment 02/26/2023      Lab Results   Component Value Date    HGBA1C 8.3 (H) 02/23/2023    HGBA1C 8.5 10/18/2022    HGBA1C 7.1 02/04/2021    HGBA1C 7.1 02/04/2021     No results found for: "CHOL"   Lab Results   Component Value Date    HDL 37 (A) 08/20/2021    HDL 34 (L) 01/15/2019      Lab Results   Component Value Date    LDLCALC 54 01/15/2019      Lab Results   Component Value Date    TRIG 63 08/20/2021    TRIG 70 01/15/2019     No components found for: "PROCAL"      Micro:  Results from last 7 days   Lab Units 06/30/23  1341   GRAM STAIN RESULT  No Polys or Bacteria seen   BODY FLUID CULTURE, STERILE  No growth     Urinalysis:  No results found for: "AMPHETUR", "BDZUR", "COCAINEUR", "OPIATEUR", "PCPUR", "Jax Standard", "ETOH", "ACTMNPHEN", "SALICYLATE"       Invalid input(s): "URIBILINOGEN"        Intake and Outputs:  I/O       06/30 0701  07/01 0700 07/01 0701  07/02 0700 07/02 0701  07/03 0700    P. O. 480 1620 354    I.V. (mL/kg)  30 (0.5)     NG/GT  30     IV Piggyback 480 100     Total Intake(mL/kg) 960 (13.7) 1780 (26.7) 354 (5.3)    Urine (mL/kg/hr) 1138 (0.7) 1010 (0.6) 300 (1)    Drains 860 300     Stool  0 0    Chest Tube 220 180 0    Total Output 2218 1490 300    Net -1258 +290 +54           Unmeasured Stool Occurrence  1 x 1 x        Nutrition:  Diet Cardiovascular; Sodium 2 GM; Fluid Restriction 1500 ML, Consistent Carbohydrate Diet Level 1 (4 carb servings/60 grams CHO/meal)  Radiology Results:   XR chest portable ICU   Final Result by Carol Zimmer MD (07/01 1148)      Pulmonary artery catheter in main pulmonary artery. Persistent enlargement of the cardiac silhouette with pericardial drain. Right pleural pigtail catheter with stable small loculated right hydropneumothorax and mild bibasilar atelectasis.                Workstation performed: [id number]         XR chest portable ICU   Final Result by Carol Zimmer MD (07/01 1611)      Persistent small loculated right hydropneumothorax with moderate left effusion and bibasilar atelectasis. Pulmonary artery catheter in right interlobar pulmonary artery, subsequently retracted. Workstation performed: [id number]         XR chest portable ICU   Final Result by Denver Ba MD (07/02 1908)      Pericardial drain with slight decrease in marked enlargement of cardiac silhouette from pericardial effusion. Persistent small loculated right basilar hydropneumothorax and small left effusion. Persistent right base atelectasis.                Workstation performed: [id number]           Scheduled Medications:  ascorbic acid, 500 mg, Daily  atorvastatin, 40 mg, Daily With Dinner  cholecalciferol, 2,000 Units, Daily  docusate sodium, 100 mg, BID  heparin (porcine), 5,000 Units, Q8H [facility]  insulin glargine, 5 Units, HS  insulin lispro, 1-5 Units, TID AC  insulin lispro, 1-5 Units, HS  insulin lispro, 5 Units, TID With Meals  latanoprost, 1 drop, HS  melatonin, 6 mg, HS  ondansetron, 4 mg, Once  polyethylene glycol, 17 g, Daily  senna, 2 tablet, HS  tamsulosin, 0.4 mg, Daily With Dinner  timolol, 1 drop, Daily  torsemide, 40 mg, Daily      PRN MEDS:  acetaminophen, 650 mg, Q4H PRN  HYDROmorphone, 0.2 mg, Q4H PRN  ondansetron, 4 mg, Q4H PRN  oxyCODONE, 5 mg, Q4H PRN  oxyCODONE, 2.5 mg, Q4H PRN      Last 24 Hour Meds: :   Medication Administration - last 24 hours from 07/05/2023 0806 to 07/06/2023 4376       Date/Time Order Dose Route Action Action by     07/05/2023 0829 EDT ascorbic acid (VITAMIN C) tablet 500 mg 500 mg Oral Given Calista Camacho RN     07/05/2023 1633 EDT atorvastatin (LIPITOR) tablet 40 mg 40 mg Oral Given Calista Camacho RN     07/05/2023 0130 EDT cholecalciferol (VITAMIN D3) tablet 2,000 Units 2,000 Units Oral Given Calista Camacho RN     07/05/2023 1701 EDT docusate sodium (COLACE) capsule 100 mg 100 mg Oral Not Given Calista Camacho RN     07/05/2023 4646 EDT docusate sodium (COLACE) capsule 100 mg 100 mg Oral Not Given Meron Wood RN     07/05/2023 2131 EDT latanoprost (XALATAN) 0.005 % ophthalmic solution 1 drop 1 drop Both Eyes Given Felicia Jnae RN     07/05/2023 6769 EDT polyethylene glycol (MIRALAX) packet 17 g 17 g Oral Not Given Meron Wood RN     07/05/2023 2126 EDT senna (SENOKOT) tablet 17.2 mg 17.2 mg Oral Not Given Fleicia Jane RN     07/05/2023 1633 EDT tamsulosin (FLOMAX) capsule 0.4 mg 0.4 mg Oral Given Meron Wood RN     07/05/2023 9205 EDT timolol (TIMOPTIC) 0.5 % ophthalmic solution 1 drop 1 drop Both Eyes Given Meron Wood RN     07/05/2023 3624 EDT torsemide (DEMADEX) tablet 40 mg 40 mg Oral Given Meron Wood RN     07/05/2023 2130 EDT melatonin tablet 6 mg 6 mg Oral Given Felicia Jane RN     07/06/2023 0601 EDT insulin lispro (HumaLOG) 100 units/mL subcutaneous injection 1-5 Units -- Subcutaneous Not Given Felicia Jane RN     07/05/2023 1633 EDT insulin lispro (HumaLOG) 100 units/mL subcutaneous injection 1-5 Units 1 Units Subcutaneous Given Meron Wood RN     07/05/2023 1112 EDT insulin lispro (HumaLOG) 100 units/mL subcutaneous injection 1-5 Units 2 Units Subcutaneous Given Meron Wood RN     07/05/2023 2131 EDT insulin lispro (HumaLOG) 100 units/mL subcutaneous injection 1-5 Units 1 Units Subcutaneous Given Felicia Jane RN     07/05/2023 2130 EDT insulin glargine (LANTUS) subcutaneous injection 5 Units 0.05 mL 5 Units Subcutaneous Given Felicia Jane RN     07/05/2023 1632 EDT insulin lispro (HumaLOG) 100 units/mL subcutaneous injection 5 Units 5 Units Subcutaneous Given Meron Wood RN     07/05/2023 1112 EDT insulin lispro (HumaLOG) 100 units/mL subcutaneous injection 5 Units 5 Units Subcutaneous Given Meron Wood RN     07/06/2023 5093 EDT heparin (porcine) subcutaneous injection 5,000 Units 5,000 Units Subcutaneous Given Felicia Jane RN     07/05/2023 2131 EDT heparin (porcine) subcutaneous injection 5,000 Units 5,000 Units Subcutaneous Given Manoj Healy RN     07/05/2023 1435 EDT heparin (porcine) subcutaneous injection 5,000 Units 5,000 Units Subcutaneous Given Rosio Ireland RN     07/05/2023 8189 EDT oxyCODONE (ROXICODONE) IR tablet 5 mg 5 mg Oral Given Rosio Ireland RN          PLEASE NOTE:  This encounter was completed utilizing the [employer] Direct Speech Voice Recognition Software. Grammatical errors, random word insertions, pronoun errors and incomplete sentences are occasional consequences of the system due to software limitations, ambient noise and hardware issues. These may be missed by proof reading prior to affixing electronic signature. Any questions or concerns about the content, text or information contained within the body of this dictation should be directly addressed to the physician for clarification. Please do not hesitate to call me directly if you have any any questions or concerns.

## 2023-07-06 NOTE — OCCUPATIONAL THERAPY NOTE
Occupational Therapy Progress Note     Patient Name: June CLEMENT Date: 7/6/2023  Problem List  Principal Problem:    Pericardial effusion  Active Problems:    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 (HCC)    Chronic combined systolic and diastolic congestive heart failure (HCC)    Atrial fibrillation with slow ventricular response (HCC)    Acute kidney injury superimposed on chronic kidney disease ([address])    Anemia    Urinary retention    Pneumonia    Encephalopathy              07/06/23 0955   OT Last Visit   OT Visit Date 07/06/23   Note Type   Note Type Treatment   Pain Assessment   Pain Assessment Tool 0-10   Pain Score 5   Pain Location/Orientation Location: Generalized   Effect of Pain on Daily Activities Impacts ability to engage in valued occupations   Hospital Pain Intervention(s) Repositioned; Ambulation/increased activity; Emotional support   Restrictions/Precautions   Weight Bearing Precautions Per Order No   Other Precautions Cardiac/sternal;Cognitive;Multiple lines;Telemetry; Fall Risk;Pain  (chest tube)   Lifestyle   Autonomy Pt is a (?) historian, but reports being I at baseline with ADLs and mobility. Reciprocal Relationships Son   Service to Others Retired   Intrinsic Gratification pt mostly sedentary   ADL   Where Assessed Edge of bed   UB Dressing Assistance 3  Moderate Assistance   UB Dressing Deficit Setup;Steadying; Thread RUE; Thread LUE;Pull over head;Pull around back;Pull down in back   LB Dressing Assistance 2  Maximal Assistance   LB Dressing Deficit Setup;Steadying;Verbal cueing;Supervision/safety; Don/doff R sock; Don/doff L sock   Toileting Assistance  2  Maximal Assistance   Toileting Deficit Steadying;Clothing management up;Clothing management down;Perineal hygiene   Bed Mobility   Supine to Sit 4  Minimal assistance   Additional items Assist x 1;HOB elevated; Bedrails; Increased time required;Verbal cues;LE management   Sit to Supine 4 Minimal assistance   Additional items Assist x 1;Bedrails; Increased time required;Verbal cues;LE management   Additional Comments Pt performed supine <> sit with Min A x1 for UB postural support; when returning to seated position EOB, pt performed 3-4 lateral scoots towards HOB and required Min A to return to supine position w/ UB postural support/LE management; left with all functional needs in reach   Transfers   Sit to Stand 4  Minimal assistance   Additional items Assist x 1; Increased time required;Verbal cues   Stand to Sit 4  Minimal assistance   Additional items Assist x 1; Increased time required;Verbal cues   Additional Comments w/ RW; in standing, pt incontinent of stool   Functional Mobility   Functional Mobility 4  Minimal assistance   Additional Comments Pt completed short household functional mobility distances w/ Min A x1 w/ RW for safety and support with chair follow in which pt required 1 seated rest break   Additional items Rolling walker   Subjective   Subjective "I feel weak."   Cognition   Overall Cognitive Status Impaired   Arousal/Participation Responsive;Arousable; Cooperative   Attention Attends with cues to redirect   Memory Decreased recall of precautions   Following Commands Follows one step commands with increased time or repetition   Comments Pt pleasant and cooperative however presenting with decreased safety awareness/insight, requiring verbal cues for safety throughout   Activity Tolerance   Activity Tolerance Patient limited by fatigue   Medical Staff SHAHANA Dawson   Assessment   Assessment Pt is a 79 yo male who actively participated in skilled OT session on 7/6/2023. Pt seen with PT to increase safety, decrease fall risk, and maximize functional/occupational performance 2* medical complexity which is a regression from pt's functional baseline.  Treatment focused to improve functional transfers with fall prevention strategies, static/dynamic balance, postural/trunk control, proper body mechanics, functional use of b/l UE's, higher level cognitive functions, safety awareness, and overall increased activity tolerance in ADL/IADL/leisure tasks. Upon arrival, pt found lying supine in bed and was agreeable to OT session. Pt performed supine <> sit with Min A x1 for UB postural support and completed STS w/ Min A x1 w/ RW. In standing, pt incontinent of stool, requiring Max A for posterior hygiene. Pt completed short household functional mobility distances w/ Min A x1 w/ RW for safety and support with chair follow in which pt required 1 seated rest break 2* increased fatigue/generalized weakness. In seated position, pt completed LB dressing tasks w/ Max A and UB dressing tasks w Mod A. At the end of the session, pt was left lying supine in bed with all functional needs in reach. Pt demonstrates gradual functional improvements towards OT goals however continues to be functioning below occupational baseline and is still limited by the following limitations/impairments which were addressed through skilled instruction: cognition, generalized weakness, balance, endurance/activity tolerance, postural/trunk control, strength, pain, and safety awareness. At this time, recommend discharge to post-acute rehab when medically stable. The patient's raw score on the -PAC Daily Activity Inpatient Short Form is 16. A raw score of less than 19 suggests the patient may benefit from discharge to post-acute rehabilitation services. Please refer to the recommendation of the Occupational Therapist for safe discharge planning. Established OT goals will be continued 2-3x/wk to address immediate acute care needs and underlying performance skills to maximize occupational performance and safety to return to [facility]. Plan   Treatment Interventions ADL retraining;Functional transfer training;UE strengthening/ROM; Endurance training;Cognitive reorientation;Patient/family training;Equipment evaluation/education; Compensatory technique education;Continued evaluation; Energy conservation; Activityengagement   Goal Expiration Date 07/17/23   OT Treatment Day 1   OT Frequency 2-3x/wk   Recommendation   OT Discharge Recommendation Post acute rehabilitation services   AM-PAC Daily Activity Inpatient   Lower Body Dressing 2   Bathing 2   Toileting 2   Upper Body Dressing 3   Grooming 3   Eating 4   Daily Activity Raw Score 16   Daily Activity Standardized Score (Calc for Raw Score >=11) 35.96   AM-PAC Applied Cognition Inpatient   Following a Speech/Presentation 2   Understanding Ordinary Conversation 3   Taking Medications 2   Remembering Where Things Are Placed or Put Away 2   Remembering List of 4-5 Errands 2   Taking Care of Complicated Tasks 2   Applied Cognition Raw Score 13   Applied Cognition Standardized Score 30.46   End of Consult   Education Provided Yes   Patient Position at End of Consult Supine; All needs within reach   Nurse Communication Nurse aware of consult     Corwin Negrete MS, OTR/L

## 2023-07-06 NOTE — PLAN OF CARE
Problem: PHYSICAL THERAPY ADULT  Goal: Performs mobility at highest level of function for planned discharge setting. See evaluation for individualized goals. Description: Treatment/Interventions: LE strengthening/ROM, Therapeutic exercise, ADL retraining, Functional transfer training, Elevations, Endurance training, Cognitive reorientation, Patient/family training, Equipment eval/education, Bed mobility, Gait training, Spoke to nursing, OT (PT spoke to CM)  Equipment Recommended: Melonie Ferro       See flowsheet documentation for full assessment, interventions and recommendations. Note: Prognosis: Fair  Problem List: Decreased strength, Decreased endurance, Impaired balance, Decreased mobility, Decreased coordination, Decreased cognition, Decreased safety awareness, Pain, Impaired judgement  Assessment: Patient received in bed. Patient agreeable to therapy. Patient performs bed mobility with Min A. Patient able to sit EOB with fluctuating need for assist Supervision-Min A for trunk stability and upright positioning. Patient performs functional transfers with Min A. Patient performs ambulation with Min A using RW, 20'x2. Following ambulation, patient requests return to bed due to fatigue. Patient performs lateral scoots at EOB toward [facility] for improved positioning, but requires Min A to return to supine positioning. Patient left in bed with all needs met and call bell/personal items within reach. The patient's AM-PAC Basic Mobility Inpatient Short Form Raw Score is 15 showing further need for skilled PT services in order to improve functional mobility, decrease need for assistance, and return to PLOF. A Raw score of less than 16 suggests the patient may benefit from discharge to post-acute rehabilitation services. PT continues to recommend rehab on d/c. Will continue to follow as able.   Barriers to Discharge: None     PT Discharge Recommendation: Post acute rehabilitation services    See flowsheet documentation for full assessment.

## 2023-07-06 NOTE — PROGRESS NOTES
INTERNAL MEDICINE RESIDENCY PROGRESS NOTE     Name: Bettie Allen   Age & Sex: 78 y.o. male   MRN: [id number]  Unit/Bed#: [facility] 402-01   Encounter: [id number]  Team: SLIM    PATIENT INFORMATION     Name: Bettie Allen   Age & Sex: 78 y.o. male   MRN: [id number]  Hospital Stay Days: 8    ASSESSMENT/PLAN     Principal Problem:    Pericardial effusion  Active Problems:    Chronic combined systolic and diastolic congestive heart failure (HCC)    Pneumonia    Encephalopathy    Urinary retention    Anemia    Acute kidney injury superimposed on chronic kidney disease (HCC)    Atrial fibrillation with slow ventricular response (HCC)    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 (HCC)      * Pericardial effusion  Assessment & Plan  • Large acute on chronic pericardial effusion concerning for possible early hemodynamic changes for tamponade  ? S/p pericardiocentesis 6/30  • Cultures are negative  • Repeat echo 7/3/2023 showed small loculated pericardial effusion at the apex, small effusion along the RA free wall  • Repeat echo 7/6/23 showed concentric remodeling, biatrial dilation but no pericardial effusion. The pericardium was markedly thickened and marked ventricular septal motion abnormality.   • Pericardial drain removed on 07/05 per cards, will likely not need pericardial window d/t no recurrence of effusion on echo  • PAC removed    Chronic combined systolic and diastolic congestive heart failure (HCC)  Assessment & Plan  Wt Readings from Last 3 Encounters:   07/05/23 65 kg (143 lb 4.8 oz)   06/28/23 74.5 kg (164 lb 3.9 oz)   05/11/23 66.7 kg (147 lb)     • Diuretics held on admission in the setting of shock and KARINA  • Torsemide 40 mg daily restarted per cardiology  • 2.8L diuresis and net negative, exam still with 2+ pitting edema on 07/06  • Continue diuresis  • I/O, daily weight, low-sodium diet with fluid restriction        Pneumonia  Assessment & Plan  • Pneumonia C/F with right lower lobe empyema status post right chest tube  • Drained ~450mL this AM  • Will need thoracic on board to follow chest tube  • Patient has completed 7-day cefepime course    Encephalopathy  Assessment & Plan  • Likely toxic/metabolic secondary to critical illness and delirium  • Continue delirium precautions  • Patient with difficulty sleeping  • Melatonin and mirtazapine were added and critical care  • Improved mentation on 07/06 compared to previous, now AOx3    Urinary retention  Assessment & Plan  • Dupont placed for urinary retention, can consider void trial on 07/07  • Does have history of BPH, continue Flomax.    •  Consider reaching out to urology for outpatient follow-up    Anemia  Assessment & Plan  • Hemoglobin 11.0 present on admission  • Platelet count with improvement at 126  • Hgb decreased from 8.7-7.9 x 2, now at 7.2  • Continue trending CBC    Acute kidney injury superimposed on chronic kidney disease [facility])  Assessment & Plan  Lab Results   Component Value Date    EGFR 22 07/05/2023    EGFR 20 07/04/2023    EGFR 18 07/03/2023    CREATININE 2.54 (H) 07/05/2023    CREATININE 2.72 (H) 07/04/2023    CREATININE 3.00 (H) 07/03/2023     • Resolving, likely secondary cardiorenal  • Baseline creatinine 2.5-3, now at 2.54  • Patient is status post pericardiocentesis and diuresis  • Critical care held diuresis 6/20/2023 when fluid shifts secondary to large pericardial drainage  • Currently on torsemide 40 mg daily  • Trend BMPs    Atrial fibrillation with slow ventricular response (HCC)  Assessment & Plan  • Will continue to hold Eliquis d/t recent downtrending Hgb  • Will consider restarting closer to discharge    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 [facility])  Assessment & Plan  Lab Results   Component Value Date    HGBA1C 8.3 (H) 02/23/2023       Recent Labs     07/04/23  1603 07/04/23  2035 07/05/23  0541 07/05/23  1052   POCGLU 155* 230* 139 269*       Blood Sugar Average: Last 72 hrs:  (P) 218     • Added 10 units Lantus at at bedtime on 7/3/2023, hypoglycemic this morning, decrease to Lantus 5 units  • Added 5 units lispro 3 times daily with meals on 7/3/2023  • Continue sliding scale insulin algorithm 3        Disposition: pending improvement in mentation and chest tube removal    SUBJECTIVE     Patient seen and examined. No acute events overnight. This morning, he reports some mild shortness of breath and some difficulty sleeping overnight. Otherwise no other complaints, he denies any CP and abdominal pain. OBJECTIVE     Vitals:    23 0728 23 0748 23 0940 23 1453   BP: (!) 129/103  (!) 129/103 113/58   BP Location:       Pulse: 88  88 87   Resp: 17   18   Temp: (!) 96.9 °F (36.1 °C)      TempSrc:       SpO2: 98%   99%   Weight:  65 kg (143 lb 4.8 oz) 64.9 kg (143 lb)    Height:   5' 9" (1.753 m)       Temperature:   Temp (24hrs), Av.3 °F (36.3 °C), Min:96.9 °F (36.1 °C), Max:97.6 °F (36.4 °C)    Temperature: (!) 96.9 °F (36.1 °C)  Intake & Output:  I/O        0701  07/ 07 0701   07 0701   0700    P. O. 1178 1375 300    NG/GT       Total Intake(mL/kg) 1178 (17.8) 1375 (20.9) 300 (4.6)    Urine (mL/kg/hr) 2850 (1.8) 2025 (1.3) 550 (1)    Drains 0 0     Stool 0 0     Chest Tube 130 10 20    Total Output 2980 570    Net -1802 -660 -270           Unmeasured Stool Occurrence 2 x 6 x         Weights:   IBW (Ideal Body Weight): 70.7 kg    Body mass index is 21.12 kg/m². Weight (last 2 days)     Date/Time Weight    23 0940 64.9 (143)    23 0748 65 (143.3)    23 0600 65.7 (144.84)    23 0828 65 (143.3)    23 0500 66.2 (145.9)    23 0600 66.3 (146.17)        Physical Exam  Vitals reviewed. Constitutional:       General: He is not in acute distress. Comments: Frail-appearing   HENT:      Head: Normocephalic and atraumatic.       Mouth/Throat:      Mouth: Mucous membranes are dry. Pharynx: Oropharynx is clear. Eyes:      Conjunctiva/sclera: Conjunctivae normal.      Comments: Aniscoria   Cardiovascular:      Rate and Rhythm: Normal rate and regular rhythm. Pulses: Normal pulses. Heart sounds: Normal heart sounds. No murmur heard. No friction rub. No gallop. Pulmonary:      Effort: Pulmonary effort is normal. No respiratory distress. Breath sounds: Normal breath sounds. No wheezing. Abdominal:      General: Abdomen is flat. Bowel sounds are normal. There is no distension. Palpations: Abdomen is soft. Tenderness: There is no abdominal tenderness. Genitourinary:     Comments: Dupont catheter present and intact  Musculoskeletal:      Right lower leg: Edema present. Left lower leg: Edema present. Comments: 2+ pitting edema   Skin:     General: Skin is warm. Capillary Refill: Capillary refill takes less than 2 seconds. Comments: Pericardial drain removed, site appears clean without erythema. Right chest tube present   Neurological:      General: No focal deficit present. Mental Status: He is alert and oriented to person, place, and time. Cranial Nerves: No cranial nerve deficit. Comments: Muscle strength 2/5 across B/L UE     Psychiatric:         Mood and Affect: Mood normal.         Behavior: Behavior normal.       LABORATORY DATA     Labs: I have personally reviewed pertinent reports.   Results from last 7 days   Lab Units 07/06/23 0419 07/05/23 0428 07/04/23  0450   WBC Thousand/uL 6.59 7.39 11.22*   HEMOGLOBIN g/dL 7.2* 7.4* 7.9*   HEMATOCRIT % 23.1* 22.5* 23.5*   PLATELETS Thousands/uL 139* 122* 126*   NEUTROS PCT % 72 78* 78*   MONOS PCT % 9 8 7   EOS PCT % 1 1 1      Results from last 7 days   Lab Units 07/06/23 0419 07/05/23 0428 07/04/23  0450   POTASSIUM mmol/L 3.9 4.1 4.0   CHLORIDE mmol/L 99 100 103   CO2 mmol/L 29 30 28   BUN mg/dL 99* 102* 109*   CREATININE mg/dL 2.41* 2.54* 2.72* CALCIUM mg/dL 8.0* 8.0* 8.4     Results from last 7 days   Lab Units 07/02/23  0423 07/01/23  1219 07/01/23  0429   MAGNESIUM mg/dL 2.4 2.3 2.2     Results from last 7 days   Lab Units 07/02/23  0423 07/01/23  1219 07/01/23  0429   PHOSPHORUS mg/dL 3.5 4.2* 4.4*                    IMAGING & DIAGNOSTIC TESTING     Radiology Results: I have personally reviewed pertinent reports. XR chest portable ICU    Result Date: 7/2/2023  Impression: Pericardial drain with slight decrease in marked enlargement of cardiac silhouette from pericardial effusion. Persistent small loculated right basilar hydropneumothorax and small left effusion. Persistent right base atelectasis. Workstation performed: [id number]     XR chest portable ICU    Result Date: 7/1/2023  Impression: Persistent small loculated right hydropneumothorax with moderate left effusion and bibasilar atelectasis. Pulmonary artery catheter in right interlobar pulmonary artery, subsequently retracted. Workstation performed: [id number]     XR chest portable ICU    Result Date: 7/1/2023  Impression: Pulmonary artery catheter in main pulmonary artery. Persistent enlargement of the cardiac silhouette with pericardial drain. Right pleural pigtail catheter with stable small loculated right hydropneumothorax and mild bibasilar atelectasis. Workstation performed: [id number]     Other Diagnostic Testing: I have personally reviewed pertinent reports.     ACTIVE MEDICATIONS     Current Facility-Administered Medications   Medication Dose Route Frequency   • acetaminophen (TYLENOL) tablet 650 mg  650 mg Oral Q4H PRN   • ascorbic acid (VITAMIN C) tablet 500 mg  500 mg Oral Daily   • atorvastatin (LIPITOR) tablet 40 mg  40 mg Oral Daily With Dinner   • cholecalciferol (VITAMIN D3) tablet 2,000 Units  2,000 Units Oral Daily   • docusate sodium (COLACE) capsule 100 mg  100 mg Oral BID   • heparin (porcine) subcutaneous injection 5,000 Units  5,000 Units Subcutaneous Q8H [address]   • HYDROmorphone HCl (DILAUDID) injection 0.2 mg  0.2 mg Intravenous Q4H PRN   • insulin glargine (LANTUS) subcutaneous injection 5 Units 0.05 mL  5 Units Subcutaneous HS   • insulin lispro (HumaLOG) 100 units/mL subcutaneous injection 1-5 Units  1-5 Units Subcutaneous TID AC   • insulin lispro (HumaLOG) 100 units/mL subcutaneous injection 1-5 Units  1-5 Units Subcutaneous HS   • insulin lispro (HumaLOG) 100 units/mL subcutaneous injection 5 Units  5 Units Subcutaneous TID With Meals   • latanoprost (XALATAN) 0.005 % ophthalmic solution 1 drop  1 drop Both Eyes HS   • melatonin tablet 6 mg  6 mg Oral HS   • ondansetron (ZOFRAN) injection 4 mg  4 mg Intravenous Once   • ondansetron (ZOFRAN) injection 4 mg  4 mg Intravenous Q4H PRN   • oxyCODONE (ROXICODONE) IR tablet 5 mg  5 mg Oral Q4H PRN   • oxyCODONE (ROXICODONE) split tablet 2.5 mg  2.5 mg Oral Q4H PRN   • polyethylene glycol (MIRALAX) packet 17 g  17 g Oral Daily   • senna (SENOKOT) tablet 17.2 mg  2 tablet Oral HS   • tamsulosin (FLOMAX) capsule 0.4 mg  0.4 mg Oral Daily With Dinner   • timolol (TIMOPTIC) 0.5 % ophthalmic solution 1 drop  1 drop Both Eyes Daily   • torsemide (DEMADEX) tablet 40 mg  40 mg Oral Daily       VTE Pharmacologic Prophylaxis: Heparin  VTE Mechanical Prophylaxis: sequential compression device    Portions of the record may have been created with voice recognition software. Occasional wrong word or "sound a like" substitutions may have occurred due to the inherent limitations of voice recognition software.   Read the chart carefully and recognize, using context, where substitutions have occurred.  ==  Estella Burns MD  [id number] [facility]  Internal Medicine Residency PGY-1

## 2023-07-07 ENCOUNTER — APPOINTMENT (OUTPATIENT)
Dept: RADIOLOGY | Facility: HOSPITAL | Age: 83
DRG: 314 | End: 2023-07-07
Attending: INTERNAL MEDICINE
Payer: MEDICARE

## 2023-07-07 LAB
ANION GAP SERPL CALCULATED.3IONS-SCNC: 4 MMOL/L
BASOPHILS # BLD AUTO: 0.01 THOUSANDS/ÂΜL (ref 0–0.1)
BASOPHILS NFR BLD AUTO: 0 % (ref 0–1)
BUN SERPL-MCNC: 93 MG/DL (ref 5–25)
CALCIUM SERPL-MCNC: 7.9 MG/DL (ref 8.3–10.1)
CHLORIDE SERPL-SCNC: 100 MMOL/L (ref 96–108)
CO2 SERPL-SCNC: 30 MMOL/L (ref 21–32)
CREAT SERPL-MCNC: 2.25 MG/DL (ref 0.6–1.3)
EOSINOPHIL # BLD AUTO: 0.06 THOUSAND/ÂΜL (ref 0–0.61)
EOSINOPHIL NFR BLD AUTO: 1 % (ref 0–6)
ERYTHROCYTE [DISTWIDTH] IN BLOOD BY AUTOMATED COUNT: 15.4 % (ref 11.6–15.1)
GFR SERPL CREATININE-BSD FRML MDRD: 25 ML/MIN/1.73SQ M
GLUCOSE SERPL-MCNC: 100 MG/DL (ref 65–140)
GLUCOSE SERPL-MCNC: 103 MG/DL (ref 65–140)
GLUCOSE SERPL-MCNC: 182 MG/DL (ref 65–140)
GLUCOSE SERPL-MCNC: 253 MG/DL (ref 65–140)
GLUCOSE SERPL-MCNC: 260 MG/DL (ref 65–140)
HCT VFR BLD AUTO: 22.7 % (ref 36.5–49.3)
HGB BLD-MCNC: 7.1 G/DL (ref 12–17)
IMM GRANULOCYTES # BLD AUTO: 0.02 THOUSAND/UL (ref 0–0.2)
IMM GRANULOCYTES NFR BLD AUTO: 0 % (ref 0–2)
LYMPHOCYTES # BLD AUTO: 1.06 THOUSANDS/ÂΜL (ref 0.6–4.47)
LYMPHOCYTES NFR BLD AUTO: 20 % (ref 14–44)
MCH RBC QN AUTO: 33.3 PG (ref 26.8–34.3)
MCHC RBC AUTO-ENTMCNC: 31.3 G/DL (ref 31.4–37.4)
MCV RBC AUTO: 107 FL (ref 82–98)
MONOCYTES # BLD AUTO: 0.55 THOUSAND/ÂΜL (ref 0.17–1.22)
MONOCYTES NFR BLD AUTO: 10 % (ref 4–12)
NEUTROPHILS # BLD AUTO: 3.65 THOUSANDS/ÂΜL (ref 1.85–7.62)
NEUTS SEG NFR BLD AUTO: 69 % (ref 43–75)
NRBC BLD AUTO-RTO: 0 /100 WBCS
PLATELET # BLD AUTO: 137 THOUSANDS/UL (ref 149–390)
PMV BLD AUTO: 10 FL (ref 8.9–12.7)
POTASSIUM SERPL-SCNC: 3.8 MMOL/L (ref 3.5–5.3)
RBC # BLD AUTO: 2.13 MILLION/UL (ref 3.88–5.62)
SODIUM SERPL-SCNC: 134 MMOL/L (ref 135–147)
WBC # BLD AUTO: 5.35 THOUSAND/UL (ref 4.31–10.16)

## 2023-07-07 PROCEDURE — 82948 REAGENT STRIP/BLOOD GLUCOSE: CPT

## 2023-07-07 PROCEDURE — 0WP9X0Z REMOVAL OF DRAINAGE DEVICE FROM RIGHT PLEURAL CAVITY, EXTERNAL APPROACH: ICD-10-PCS | Performed by: STUDENT IN AN ORGANIZED HEALTH CARE EDUCATION/TRAINING PROGRAM

## 2023-07-07 PROCEDURE — 99232 SBSQ HOSP IP/OBS MODERATE 35: CPT | Performed by: INTERNAL MEDICINE

## 2023-07-07 PROCEDURE — 71045 X-RAY EXAM CHEST 1 VIEW: CPT

## 2023-07-07 PROCEDURE — 85025 COMPLETE CBC W/AUTO DIFF WBC: CPT

## 2023-07-07 PROCEDURE — NC001 PR NO CHARGE: Performed by: NURSE PRACTITIONER

## 2023-07-07 PROCEDURE — 80048 BASIC METABOLIC PNL TOTAL CA: CPT

## 2023-07-07 PROCEDURE — 99223 1ST HOSP IP/OBS HIGH 75: CPT | Performed by: INTERNAL MEDICINE

## 2023-07-07 RX ORDER — POTASSIUM CHLORIDE 20 MEQ/1
40 TABLET, EXTENDED RELEASE ORAL ONCE
Status: COMPLETED | OUTPATIENT
Start: 2023-07-07 | End: 2023-07-07

## 2023-07-07 RX ADMIN — LATANOPROST 1 DROP: 50 SOLUTION OPHTHALMIC at 21:29

## 2023-07-07 RX ADMIN — TAMSULOSIN HYDROCHLORIDE 0.4 MG: 0.4 CAPSULE ORAL at 17:04

## 2023-07-07 RX ADMIN — INSULIN LISPRO 2 UNITS: 100 INJECTION, SOLUTION INTRAVENOUS; SUBCUTANEOUS at 11:25

## 2023-07-07 RX ADMIN — Medication 2000 UNITS: at 08:39

## 2023-07-07 RX ADMIN — INSULIN GLARGINE 5 UNITS: 100 INJECTION, SOLUTION SUBCUTANEOUS at 21:28

## 2023-07-07 RX ADMIN — INSULIN LISPRO 2 UNITS: 100 INJECTION, SOLUTION INTRAVENOUS; SUBCUTANEOUS at 21:30

## 2023-07-07 RX ADMIN — MELATONIN TAB 3 MG 6 MG: 3 TAB at 21:29

## 2023-07-07 RX ADMIN — DOCUSATE SODIUM 100 MG: 100 CAPSULE, LIQUID FILLED ORAL at 08:40

## 2023-07-07 RX ADMIN — INSULIN LISPRO 5 UNITS: 100 INJECTION, SOLUTION INTRAVENOUS; SUBCUTANEOUS at 11:25

## 2023-07-07 RX ADMIN — INSULIN LISPRO 1 UNITS: 100 INJECTION, SOLUTION INTRAVENOUS; SUBCUTANEOUS at 17:04

## 2023-07-07 RX ADMIN — INSULIN LISPRO 5 UNITS: 100 INJECTION, SOLUTION INTRAVENOUS; SUBCUTANEOUS at 08:40

## 2023-07-07 RX ADMIN — HEPARIN SODIUM 5000 UNITS: 5000 INJECTION INTRAVENOUS; SUBCUTANEOUS at 05:43

## 2023-07-07 RX ADMIN — TIMOLOL MALEATE 1 DROP: 5 SOLUTION/ DROPS OPHTHALMIC at 08:43

## 2023-07-07 RX ADMIN — OXYCODONE HYDROCHLORIDE AND ACETAMINOPHEN 500 MG: 500 TABLET ORAL at 08:39

## 2023-07-07 RX ADMIN — POTASSIUM CHLORIDE 40 MEQ: 1500 TABLET, EXTENDED RELEASE ORAL at 08:40

## 2023-07-07 RX ADMIN — ATORVASTATIN CALCIUM 40 MG: 40 TABLET, FILM COATED ORAL at 17:04

## 2023-07-07 RX ADMIN — HEPARIN SODIUM 5000 UNITS: 5000 INJECTION INTRAVENOUS; SUBCUTANEOUS at 17:04

## 2023-07-07 RX ADMIN — POLYETHYLENE GLYCOL 3350 17 G: 17 POWDER, FOR SOLUTION ORAL at 08:41

## 2023-07-07 RX ADMIN — TORSEMIDE 40 MG: 20 TABLET ORAL at 08:40

## 2023-07-07 RX ADMIN — INSULIN LISPRO 5 UNITS: 100 INJECTION, SOLUTION INTRAVENOUS; SUBCUTANEOUS at 17:04

## 2023-07-07 NOTE — CONSULTS
Consult Note - Pulmonary   Sunni Garza 78 y.o. male MRN: [id number]  Unit/Bed#: [facility] 402-01 Encounter: [id number]      Reason for consultation: Chest Tube Management    Requesting physician: Dr. Lyric Syed & Recommendations:   Right lung empyema: Chest X-Ray showed right pleural effusion, CT chest abdomen and pelvis showed infiltrate in the right lower lobe suspicious for empyema. Right sided 10 Tajik pigtail chest tube was placed by IR on 6/24. Patient received a dose of tPA/dornase on 6/25. Ultrasound revealed few septations, pleural fluid culture showed no growth. Strep pneumo and legionella antigen negative. Blood cultures showed 1/2 micrococcus luteus, likely due to contamination. Pleural fluid studies showed LDH 44, protein < 2. Patient completed 7 day course of cefepime.    - Chest tube has put out less than 100 mL over the past several days, consider removing today  - Patient remains afebrile, WNC normal, no need for further antibiotics  - Pulmonary toilet: cough and deep breathe, incentive spirometry  - OOB to chair as tolerated       History of Present Illness   HPI:  Sunni Garza is a 78 y.o. male with PMH of CKD 4, O7MI, systolic and diastolic heart failure, and atrial fibrillation who presented to the [facility] ED after several days of fatigue and diarrhea. He was found to be hypotensive, leukopenic, and tachypnic. CT showed pneumonia with parapneumonic effusion with KARINA. He was admitted to the ICU for septic shock and given pressors, from which he was successfully weaned. A chest tube was placed by IR for his effusion, an echocardiogram showed a pericardial effusion with signs of tamponade. Patient was transferred to [facility] for further management. Patient underwent percutaneous drainage of pericardial effusion, subsequent imaging has shown no recurrence. Pulmonology consulted for chest tube management. Patient seen and examined today.  He was sitting comfortably in his chair at time of exam. He reports he is not currently experiencing chest pain or dyspnea. Review of systems:  12 point review of systems was completed and was otherwise negative except as listed in HPI. Review of Systems   Constitutional: Negative for chills, fatigue and fever. Respiratory: Negative for cough, chest tightness, shortness of breath and wheezing. Cardiovascular: Positive for leg swelling. Negative for chest pain. Gastrointestinal: Negative for abdominal pain, diarrhea and nausea. Neurological: Negative for light-headedness and headaches. Historical Information   Past Medical History:   Diagnosis Date   • Atrial fibrillation [facility])    • Chronic kidney disease    • Coronary artery disease    • Diabetes mellitus ([address] [facility])    • Hyperlipidemia    • Hypertension      Past Surgical History:   Procedure Laterality Date   • CARDIAC CATHETERIZATION N/A 6/30/2023    Procedure: Cardiac pericardiocentesis; Surgeon: Arun Noriega DO;  Location: [facility] CARDIAC CATH LAB; Service: Cardiology   • CARDIAC CATHETERIZATION N/A 6/30/2023    Procedure: Cardiac RHC; Surgeon: Arun Noriega DO;  Location: [facility] CARDIAC CATH LAB;   Service: Cardiology   • EYE SURGERY     • IR CHEST TUBE PLACEMENT  6/24/2023   • SKIN CANCER EXCISION     • TONSILLECTOMY       Family History   Problem Relation Age of Onset   • Heart disease Mother    • Diabetes Father    • Vision loss Father    • Cancer Sister    • Diabetes Sister            Meds/Allergies   Current Facility-Administered Medications   Medication Dose Route Frequency   • acetaminophen (TYLENOL) tablet 650 mg  650 mg Oral Q4H PRN   • ascorbic acid (VITAMIN C) tablet 500 mg  500 mg Oral Daily   • atorvastatin (LIPITOR) tablet 40 mg  40 mg Oral Daily With Dinner   • cholecalciferol (VITAMIN D3) tablet 2,000 Units  2,000 Units Oral Daily   • docusate sodium (COLACE) capsule 100 mg  100 mg Oral BID   • heparin (porcine) subcutaneous injection 5,000 Units  5,000 Units Subcutaneous Q8H [facility]   • HYDROmorphone HCl (DILAUDID) injection 0.2 mg  0.2 mg Intravenous Q4H PRN   • insulin glargine (LANTUS) subcutaneous injection 5 Units 0.05 mL  5 Units Subcutaneous HS   • insulin lispro (HumaLOG) 100 units/mL subcutaneous injection 1-5 Units  1-5 Units Subcutaneous TID AC   • insulin lispro (HumaLOG) 100 units/mL subcutaneous injection 1-5 Units  1-5 Units Subcutaneous HS   • insulin lispro (HumaLOG) 100 units/mL subcutaneous injection 5 Units  5 Units Subcutaneous TID With Meals   • latanoprost (XALATAN) 0.005 % ophthalmic solution 1 drop  1 drop Both Eyes HS   • melatonin tablet 6 mg  6 mg Oral HS   • ondansetron (ZOFRAN) injection 4 mg  4 mg Intravenous Once   • ondansetron (ZOFRAN) injection 4 mg  4 mg Intravenous Q4H PRN   • oxyCODONE (ROXICODONE) IR tablet 5 mg  5 mg Oral Q4H PRN   • oxyCODONE (ROXICODONE) split tablet 2.5 mg  2.5 mg Oral Q4H PRN   • polyethylene glycol (MIRALAX) packet 17 g  17 g Oral Daily   • senna (SENOKOT) tablet 17.2 mg  2 tablet Oral HS   • tamsulosin (FLOMAX) capsule 0.4 mg  0.4 mg Oral Daily With Dinner   • timolol (TIMOPTIC) 0.5 % ophthalmic solution 1 drop  1 drop Both Eyes Daily   • torsemide (DEMADEX) tablet 40 mg  40 mg Oral Daily     Medications Prior to Admission   Medication   • allopurinol (ZYLOPRIM) 100 mg tablet   • ALPRAZolam (XANAX) 0.5 mg tablet   • ascorbic acid (VITAMIN C) 500 MG tablet   • atorvastatin (LIPITOR) 40 mg tablet   • Blood Pressure Monitor NILTON   • carvedilol (COREG) 6.25 mg tablet   • cholecalciferol (VITAMIN D3) 1,000 units tablet   • Eliquis 2.5 MG   • glimepiride (AMARYL) 2 mg tablet   • glimepiride (AMARYL) 4 mg tablet   • isosorbide mononitrate (IMDUR) 30 mg 24 hr tablet   • latanoprost (XALATAN) 0.005 % ophthalmic solution   • sitaGLIPtin (JANUVIA) 25 mg tablet   • tamsulosin (FLOMAX) 0.4 mg   • timolol (TIMOPTIC) 0.5 % ophthalmic solution   • Torsemide 40 MG TABS   • ZINC OXIDE PO     Allergies   Allergen Reactions   • Elemental Sulfur • Sulfa Antibiotics        Vitals: Blood pressure 97/50, pulse 84, temperature (!) 97.2 °F (36.2 °C), resp. rate 17, height 5' 9" (1.753 m), weight 64.2 kg (141 lb 8.6 oz), SpO2 98 %., ORA, Body mass index is 20.9 kg/m². Intake/Output Summary (Last 24 hours) at 7/7/2023 1049  Last data filed at 7/7/2023 0718  Gross per 24 hour   Intake 810 ml   Output 2175 ml   Net -1365 ml       Physical Exam  Physical Exam  Vitals and nursing note reviewed. Constitutional:       General: He is not in acute distress. HENT:      Head: Normocephalic and atraumatic. Cardiovascular:      Rate and Rhythm: Normal rate and regular rhythm. Heart sounds: No murmur heard. Pulmonary:      Effort: Pulmonary effort is normal.      Breath sounds: Wheezing present. Abdominal:      Tenderness: There is no abdominal tenderness. There is no guarding. Musculoskeletal:         General: Swelling present. Right lower leg: Edema present. Left lower leg: Edema present. Neurological:      Mental Status: He is alert. Labs: I have personally reviewed pertinent lab results.   Laboratory and Diagnostics  Results from last 7 days   Lab Units 07/07/23  0445 07/06/23  0419 07/05/23  0428 07/04/23  0450 07/03/23 0449 07/02/23 0423 07/01/23  0429   WBC Thousand/uL 5.35 6.59 7.39 11.22* 12.60* 12.56* 9.88   HEMOGLOBIN g/dL 7.1* 7.2* 7.4* 7.9* 7.9* 8.7* 9.5*   HEMATOCRIT % 22.7* 23.1* 22.5* 23.5* 22.9* 26.4* 29.0*   PLATELETS Thousands/uL 137* 139* 122* 126* 97* 81* 49*   NEUTROS PCT % 69 72 78* 78* 84* 86* 86*   MONOS PCT % 10 9 8 7 6 6 6   EOS PCT % 1 1 1 1 0 0 0     Results from last 7 days   Lab Units 07/07/23  0445 07/06/23  0419 07/05/23  0428 07/04/23  0450 07/03/23  0449 07/02/23  0423 07/01/23  1219   SODIUM mmol/L 134* 133* 133* 137 134* 134* 135   POTASSIUM mmol/L 3.8 3.9 4.1 4.0 4.3 4.8 4.7   CHLORIDE mmol/L 100 99 100 103 101 101 101   CO2 mmol/L 30 29 30 28 28 29 24   ANION GAP mmol/L 4 5 3 6 5 4 10   BUN mg/dL 93* 99* 102* 109* 116* 92* 80*   CREATININE mg/dL 2.25* 2.41* 2.54* 2.72* 3.00* 2.99* 3.29*   CALCIUM mg/dL 7.9* 8.0* 8.0* 8.4 8.1* 8.4 8.8   GLUCOSE RANDOM mg/dL 100 64* 125 43* 239* 263* 284*     Results from last 7 days   Lab Units 07/02/23  0423 07/01/23  1219 07/01/23  0429   MAGNESIUM mg/dL 2.4 2.3 2.2   PHOSPHORUS mg/dL 3.5 4.2* 4.4*                                   Results from last 7 days   Lab Units 07/01/23  1219   PROCALCITONIN ng/ml 0.26*       ABG:           Imaging and other studies: I have personally reviewed pertinent reports. Pulmonary function testing: None on file    EKG, Pathology, and Other Studies: I have personally reviewed pertinent reports. Code Status: Level 3 - DNAR and DNI    Skylar Piña  MS4      Disclaimer: Portions of the record may have been created with voice recognition software. Occasional wrong word or "sound a like" substitutions may have occurred due to the inherent limitations of voice recognition software. Careful consideration should be taken to recognize, using context, where substitutions have occurred.

## 2023-07-07 NOTE — PROGRESS NOTES
Cardiology Progress Note - Kimmy Joiner 78 y.o. male MRN: [id number]    Unit/Bed#: [facility] 402-01 Encounter: [id number]      Assessment:  Principal Problem:    Pericardial effusion  Active Problems:    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 (HCC)    Chronic combined systolic and diastolic congestive heart failure (HCC)    Atrial fibrillation with slow ventricular response (HCC)    Acute kidney injury superimposed on chronic kidney disease ([address])    Anemia    Urinary retention    Pneumonia    Encephalopathy    40-year-old male with  heart failure preserved EF 45%, CKD, type 2 diabetes, atrial fibrillation on Eliquis, initially presented with complicated pneumonia with both pleural and pericardial effusion now s/p chest tube and antibiotics for pleural effusion. S/p pericardiocentesis with significant resolution of his pericardial fusion. Pericardial drain removed on 7/5, repeat echo showing no evidence of further pericardial effusion.     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 Continue po torsemide at current dose 40 mg daily  2. Continue to monitor renal functions  3.  Resume eliquis when stable from pulm/thoracic standpoint    Subjective: Patient seen and examined. No significant events overnight. Objective:     Vitals: Blood pressure 97/50, pulse 84, temperature (!) 97.2 °F (36.2 °C), resp. rate 17, height 5' 9" (1.753 m), weight 64.2 kg (141 lb 8.6 oz), SpO2 98 %. , Body mass index is 20.9 kg/m².,   Orthostatic Blood Pressures    Flowsheet Row Most Recent Value   Blood Pressure 97/50 filed at 07/07/2023 0718   Patient Position - Orthostatic VS Lying filed at 07/05/2023 0710            Intake/Output Summary (Last 24 hours) at 7/7/2023 1251  Last data filed at 7/7/2023 1156  Gross per 24 hour   Intake 930 ml   Output 2500 ml   Net -1570 ml       Telemetry: N/A      Physical Exam:    GEN: June Jones appears well, alert, pleasant and cooperative   HEENT: pupils equal, round, and reactive to light; extraocular muscles intact  NECK: supple, no carotid bruits   HEART: regular rhythm, normal S1 and S2, no murmurs, clicks, gallops or rubs   LUNGS: occasional rales b/l   ABDOMEN: normal bowel sounds, soft, no tenderness, no distention  EXTREMITIES: mild edema b/l  NEURO: no focal findings   SKIN: normal without suspicious lesions on exposed skin    Medications:      Current Facility-Administered Medications:   •  acetaminophen (TYLENOL) tablet 650 mg, 650 mg, Oral, Q4H PRN, Milagro Blair PA-C  •  ascorbic acid (VITAMIN C) tablet 500 mg, 500 mg, Oral, Daily, Corey Deluca PA-C, 500 mg at 07/07/23 8907  •  atorvastatin (LIPITOR) tablet 40 mg, 40 mg, Oral, Daily With Karine Baldwin PA-C, 40 mg at 07/06/23 1658  •  cholecalciferol (VITAMIN D3) tablet 2,000 Units, 2,000 Units, Oral, Daily, Corey Deluca PA-C, 2,000 Units at 07/07/23 8888  •  docusate sodium (COLACE) capsule 100 mg, 100 mg, Oral, BID, Corey Deluca PA-C, 100 mg at 07/07/23 0840  •  heparin (porcine) subcutaneous injection 5,000 Units, 5,000 Units, Subcutaneous, Q8H [address], Gale Gilmore MD, 5,000 Units at 07/07/23 0543  •  HYDROmorphone HCl (DILAUDID) injection 0.2 mg, 0.2 mg, Intravenous, Q4H PRN, Christina Qiu PA-C  •  insulin glargine (LANTUS) subcutaneous injection 5 Units 0.05 mL, 5 Units, Subcutaneous, HS, Halle Dominguez MD, 5 Units at 07/06/23 2117  •  insulin lispro (HumaLOG) 100 units/mL subcutaneous injection 1-5 Units, 1-5 Units, Subcutaneous, TID AC, 2 Units at 07/07/23 1125 **AND** Fingerstick Glucose (POCT), , , TID AC, SUSANA Cristina  •  insulin lispro (HumaLOG) 100 units/mL subcutaneous injection 1-5 Units, 1-5 Units, Subcutaneous, HS, SUSANA Cristina, 2 Units at 07/06/23 2118  •  insulin lispro (HumaLOG) 100 units/mL subcutaneous injection 5 Units, 5 Units, Subcutaneous, TID With Meals, Mirian Tejada MD, 5 Units at 07/07/23 1125  •  latanoprost (XALATAN) 0.005 % ophthalmic solution 1 drop, 1 drop, Both Eyes, HS, E. IChucho Patiño PA-C, 1 drop at 07/06/23 2120  •  melatonin tablet 6 mg, 6 mg, Oral, HS, Tae Goodman PA-C, 6 mg at 07/06/23 2120  •  ondansetron (ZOFRAN) injection 4 mg, 4 mg, Intravenous, Once, SUSANA Cristina  •  ondansetron (ZOFRAN) injection 4 mg, 4 mg, Intravenous, Q4H PRN, Christina Qiu PA-C  •  oxyCODONE (ROXICODONE) IR tablet 5 mg, 5 mg, Oral, Q4H PRN, Christina Qiu PA-C, 5 mg at 07/05/23 5816  •  oxyCODONE (ROXICODONE) split tablet 2.5 mg, 2.5 mg, Oral, Q4H PRN, Christina Qiu PA-C, 2.5 mg at 07/05/23 0049  •  polyethylene glycol (MIRALAX) packet 17 g, 17 g, Oral, Daily, E. I. samson Bains PA-C, 17 g at 07/07/23 3720  •  senna (SENOKOT) tablet 17.2 mg, 2 tablet, Oral, HS, E. I. samson Bains PA-C, 17.2 mg at 07/03/23 2226  •  tamsulosin (FLOMAX) capsule 0.4 mg, 0.4 mg, Oral, Daily With Heddy Gaucher, PA-C, 0.4 mg at 07/06/23 1658  •  timolol (TIMOPTIC) 0.5 % ophthalmic solution 1 drop, 1 drop, Both Eyes, Daily, E. I. samson Bains PA-C, 1 drop at 07/07/23 0843  •  torsemide (DEMADEX) tablet 40 mg, 40 mg, Oral, Daily, Shashank Silveira Wynona Sandifer, PA-C, 40 mg at 07/07/23 0840     Labs & Results:        Results from last 7 days   Lab Units 07/07/23  0445 07/06/23  0419 07/05/23  0428   WBC Thousand/uL 5.35 6.59 7.39   HEMOGLOBIN g/dL 7.1* 7.2* 7.4*   HEMATOCRIT % 22.7* 23.1* 22.5*   PLATELETS Thousands/uL 137* 139* 122*         Results from last 7 days   Lab Units 07/07/23  0445 07/06/23  0419 07/05/23  0428   POTASSIUM mmol/L 3.8 3.9 4.1   CHLORIDE mmol/L 100 99 100   CO2 mmol/L 30 29 30   BUN mg/dL 93* 99* 102*   CREATININE mg/dL 2.25* 2.41* 2.54*   CALCIUM mg/dL 7.9* 8.0* 8.0*         Results from last 7 days   Lab Units 07/02/23  0423 07/01/23  1219 07/01/23  0429   MAGNESIUM mg/dL 2.4 2.3 2.2         EKG personally reviewed by Jose Sanders MD.

## 2023-07-07 NOTE — WOUND OSTOMY CARE
Progress Note - Wound   Sharath Deng 78 y.o. male MRN: [id number]  Unit/Bed#: [facility] 402-01 Encounter: [id number]        Assessment:   Patient seen today for wound care follow up assessment. Patient sitting in chair, max assist for turning from side to side. Patient is incontinent of bowel and bladder. Patient is independent with meals. 1. Left posterior leg wound- wound is partial and full thickness with beefy red tissue with epithelial tissue forming, improved with this week's assessment. Small serosanguineous drainage. Left hand, back, and thigh wound all resolved. Sacral/buttocks and B/L heels intact and blanching, preventative skin care orders placed. No induration, fluctuance, odor, warmth/temperature differences, redness, or purulence noted to the above noted wounds and skin areas assessed. New dressings applied per orders listed below. Patient tolerated well- no s/s of non-verbal pain or discomfort observed during the encounter. Bedside nurse aware of plan of care. See flow sheets for more detailed assessment findings. Wound care will continue to follow. Skin Care Plan:  1-Preventative Hydraguard to bilateral heels and sacro-buttocks BID and PRN. 2-Turn/reposition q2h or when medically stable for pressure re-distribution on skin . 3-Elevate heels to offload pressure. 4-Moisturize skin daily with skin nourishing cream  5-Ehob cushion in chair when out of bed. 6-Left Posterior Tibia Wounds: Cleanse with NSS and pat dry. Apply Maxorb to Wound Beds. Cover with ABDs, and secure with cari wrap. Change every other day or PRN. 7-Ambulatory Referral to Outpatient wound center placed for continued treatment of left posterior leg wounds. Referral Placed. Wound 06/24/23 Venous Ulcer Tibial Left;Posterior (Active)   Wound Image   07/07/23 1244   Wound Description Beefy red;Epithelialization;Fragile 07/07/23 1244   Ramandeep-wound Assessment Clean;Dry; Intact 07/07/23 1244   Wound Length (cm) 2.5 cm 07/07/23 1244   Wound Width (cm) 0.8 cm 07/07/23 1244   Wound Depth (cm) 0.1 cm 07/07/23 1244   Wound Surface Area (cm^2) 2 cm^2 07/07/23 1244   Wound Volume (cm^3) 0.2 cm^3 07/07/23 1244   Calculated Wound Volume (cm^3) 0.2 cm^3 07/07/23 1244   Change in Wound Size % 99 07/07/23 1244   Tunneling 0 cm 07/07/23 1244   Tunneling in depth located at 0 07/07/23 1244   Undermining 0 07/07/23 1244   Undermining is depth extending from 0 07/07/23 1244   Wound Site Closure JOEY 07/07/23 1244   Drainage Amount Scant 07/07/23 1244   Drainage Description Serosanguineous 07/07/23 1244   Non-staged Wound Description Full thickness 07/07/23 1244   Treatments Cleansed 07/07/23 1244   Dressing ABD;Calcium Alginate;Dry dressing 07/07/23 1244   Wound packed? No 07/07/23 1244   Packing- # removed 0 07/07/23 8388   Packing- # inserted 0 07/07/23 7525   Dressing Changed New 07/07/23 1244   Patient Tolerance Tolerated well 07/07/23 1244   Dressing Status Clean;Dry; Intact 07/07/23 1244       Karma GHOSHN, RN, [facility]

## 2023-07-07 NOTE — PROGRESS NOTES
INTERNAL MEDICINE RESIDENCY PROGRESS NOTE     Name: Carolyn Zazueta   Age & Sex: 78 y.o. male   MRN: [id number]  Unit/Bed#: [facility] 402-01   Encounter: [id number]  Team: [facility]IM    PATIENT INFORMATION     Name: Carolyn Zazueta   Age & Sex: 78 y.o. male   MRN: [id number]  Hospital Stay Days: 9    ASSESSMENT/PLAN     Principal Problem:    Pericardial effusion  Active Problems:    Chronic combined systolic and diastolic congestive heart failure (HCC)    Pneumonia    Encephalopathy    Urinary retention    Anemia    Acute kidney injury superimposed on chronic kidney disease (HCC)    Atrial fibrillation with slow ventricular response (HCC)    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 (HCC)      * Pericardial effusion  Assessment & Plan  • Large acute on chronic pericardial effusion concerning for possible early hemodynamic changes for tamponade  ? S/p pericardiocentesis 6/30  • Cultures are negative  • Repeat echo 7/3/2023 showed small loculated pericardial effusion at the apex, small effusion along the RA free wall  • PAC removed  • Repeat echo 7/6/23 showed concentric remodeling, biatrial dilation but no pericardial effusion. The pericardium was markedly thickened and marked ventricular septal motion abnormality.   • Pericardial drain removed on 07/05 per cards, will likely not need pericardial window d/t no recurrence of effusion on echo  • Will continue current course, per cardiology    Chronic combined systolic and diastolic congestive heart failure ([address])  Assessment & Plan  Wt Readings from Last 3 Encounters:   07/05/23 65 kg (143 lb 4.8 oz)   06/28/23 74.5 kg (164 lb 3.9 oz)   05/11/23 66.7 kg (147 lb)     • Diuretics held on admission in the setting of shock and KARINA  • Torsemide 40 mg daily restarted per cardiology  • 2.8L diuresis and net negative, exam still with 2+ pitting edema on 07/06  • Pitting edema significantly improved on 07/07  • Continue diuresis  • I/O, daily weight, low-sodium diet with fluid restriction        Pneumonia  Assessment & Plan  • Pneumonia C/F with right lower lobe empyema status post right chest tube  • Chest tube removed by IR today  • Patient has completed 7-day cefepime course    Encephalopathy  Assessment & Plan  • Likely toxic/metabolic secondary to critical illness and delirium  • Continue delirium precautions  • Patient with difficulty sleeping  • Melatonin and mirtazapine were added and critical care  • Improved mentation on 07/06 compared to previous, now AOx3  • Mentation seems to be closer to baseline per son    Urinary retention  Assessment & Plan  • Dupont placed for urinary retention  • Dupont catheter now removed  • Will monitor I/Os  • Does have history of BPH, continue Flomax.    •  Consider reaching out to urology for outpatient follow-up    Anemia  Assessment & Plan  • Hemoglobin 11.0 present on admission  • Platelet count with improvement at 126  • Hgb decreased from 8.7-7.9 x 2 -> 7.2 -> 7.1  • Blood consent obtained  • Continue trending CBC    Acute kidney injury superimposed on chronic kidney disease [facility])  Assessment & Plan  Lab Results   Component Value Date    EGFR 22 07/05/2023    EGFR 20 07/04/2023    EGFR 18 07/03/2023    CREATININE 2.54 (H) 07/05/2023    CREATININE 2.72 (H) 07/04/2023    CREATININE 3.00 (H) 07/03/2023     • Resolving, likely secondary cardiorenal  • Baseline creatinine 2.5-3, now at 2.54  • Patient is status post pericardiocentesis and diuresis  • Critical care held diuresis 6/20/2023 when fluid shifts secondary to large pericardial drainage  • Currently on torsemide 40 mg daily  • Trend BMPs    Atrial fibrillation with slow ventricular response (HCC)  Assessment & Plan  • Will continue to hold Eliquis d/t recent downtrending Hgb  • Will consider restarting closer to discharge    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 [facility])  Assessment & Plan  Lab Results   Component Value Date HGBA1C 8.3 (H) 2023       Recent Labs     23  1603 23  2035 23  0541 23  1052   POCGLU 155* 230* 139 269*       Blood Sugar Average: Last 72 hrs:  (P) 218     • Added 10 units Lantus at at bedtime on 7/3/2023, hypoglycemic this morning, decrease to Lantus 5 units  • Added 5 units lispro 3 times daily with meals on 7/3/2023  • Continue sliding scale insulin algorithm 3        Disposition: pending improvement in medical condition    SUBJECTIVE     Patient seen and examined. No acute events overnight. Patient had no other complaints. Denies CP, SOB, and abdominal pain. OBJECTIVE     Vitals:    23 0558 23 0718 23 0900 23 1512   BP:  97/50  96/55   Pulse:  84  67   Resp:  17     Temp:  (!) 97.2 °F (36.2 °C)  (!) 96.8 °F (36 °C)   TempSrc:       SpO2:  98% 98% 96%   Weight: 64.2 kg (141 lb 8.6 oz)      Height:          Temperature:   Temp (24hrs), Av.2 °F (36.2 °C), Min:96.8 °F (36 °C), Max:97.7 °F (36.5 °C)    Temperature: (!) 96.8 °F (36 °C)  Intake & Output:  I/O        07 07 07 07 07 07    P. O. 1375 670 560    Total Intake(mL/kg) 1375 (20.9) 670 (10.4) 560 (8.7)    Urine (mL/kg/hr)  (1.3) 2125 (1.4) 425 (0.6)    Drains 0      Stool 0 0 0    Chest Tube 10 50     Total Output  2175 425    Net -660 -1505 +135           Unmeasured Stool Occurrence 6 x 1 x 1 x        Weights:   IBW (Ideal Body Weight): 70.7 kg    Body mass index is 20.9 kg/m². Weight (last 2 days)     Date/Time Weight    23 0558 64.2 (141.54)    07/06/23 0940 64.9 (143)    23 0748 65 (143.3)    23 0600 65.7 (144.84)    23 0828 65 (143.3)    23 0500 66.2 (145.9)        Physical Exam  Vitals reviewed. HENT:      Head: Normocephalic and atraumatic. Eyes:      Conjunctiva/sclera: Conjunctivae normal.      Pupils: Pupils are equal, round, and reactive to light.    Cardiovascular:      Rate and Rhythm: Normal rate and regular rhythm. Pulses: Normal pulses. Heart sounds: Normal heart sounds. No murmur heard. No friction rub. No gallop. Pulmonary:      Effort: Pulmonary effort is normal. No respiratory distress (soft breath sounds on the R). Breath sounds: Normal breath sounds. Abdominal:      General: Abdomen is flat. Bowel sounds are normal.      Palpations: Abdomen is soft. Genitourinary:     Comments: Dupont catheter removed  Musculoskeletal:         General: Swelling (1+ B/L LE edema to mid-shin) present. Skin:     General: Skin is warm and dry. Comments: Chest tube removed, surrounding area without erythema, with tegaderm in place. Neurological:      General: No focal deficit present. Mental Status: He is alert. Comments: AOx3 to person, place, and year  Resting tremor in R arm    LABORATORY DATA     Labs: I have personally reviewed pertinent reports. Results from last 7 days   Lab Units 07/07/23 0445 07/06/23 0419 07/05/23 0428   WBC Thousand/uL 5.35 6.59 7.39   HEMOGLOBIN g/dL 7.1* 7.2* 7.4*   HEMATOCRIT % 22.7* 23.1* 22.5*   PLATELETS Thousands/uL 137* 139* 122*   NEUTROS PCT % 69 72 78*   MONOS PCT % 10 9 8   EOS PCT % 1 1 1      Results from last 7 days   Lab Units 07/07/23 0445 07/06/23 0419 07/05/23 0428   POTASSIUM mmol/L 3.8 3.9 4.1   CHLORIDE mmol/L 100 99 100   CO2 mmol/L 30 29 30   BUN mg/dL 93* 99* 102*   CREATININE mg/dL 2.25* 2.41* 2.54*   CALCIUM mg/dL 7.9* 8.0* 8.0*     Results from last 7 days   Lab Units 07/02/23 0423 07/01/23  1219 07/01/23  0429   MAGNESIUM mg/dL 2.4 2.3 2.2     Results from last 7 days   Lab Units 07/02/23  0423 07/01/23  1219 07/01/23  0429   PHOSPHORUS mg/dL 3.5 4.2* 4.4*                    IMAGING & DIAGNOSTIC TESTING     Radiology Results: I have personally reviewed pertinent reports.   XR chest portable ICU    Result Date: 7/2/2023  Impression: Pericardial drain with slight decrease in marked enlargement of cardiac silhouette from pericardial effusion. Persistent small loculated right basilar hydropneumothorax and small left effusion. Persistent right base atelectasis. Workstation performed: [id number]     XR chest portable ICU    Result Date: 7/1/2023  Impression: Persistent small loculated right hydropneumothorax with moderate left effusion and bibasilar atelectasis. Pulmonary artery catheter in right interlobar pulmonary artery, subsequently retracted. Workstation performed: [id number]     XR chest portable ICU    Result Date: 7/1/2023  Impression: Pulmonary artery catheter in main pulmonary artery. Persistent enlargement of the cardiac silhouette with pericardial drain. Right pleural pigtail catheter with stable small loculated right hydropneumothorax and mild bibasilar atelectasis. Workstation performed: [id number]     Other Diagnostic Testing: I have personally reviewed pertinent reports.     ACTIVE MEDICATIONS     Current Facility-Administered Medications   Medication Dose Route Frequency   • acetaminophen (TYLENOL) tablet 650 mg  650 mg Oral Q4H PRN   • ascorbic acid (VITAMIN C) tablet 500 mg  500 mg Oral Daily   • atorvastatin (LIPITOR) tablet 40 mg  40 mg Oral Daily With Dinner   • cholecalciferol (VITAMIN D3) tablet 2,000 Units  2,000 Units Oral Daily   • docusate sodium (COLACE) capsule 100 mg  100 mg Oral BID   • heparin (porcine) subcutaneous injection 5,000 Units  5,000 Units Subcutaneous Q8H [facility]   • HYDROmorphone HCl (DILAUDID) injection 0.2 mg  0.2 mg Intravenous Q4H PRN   • insulin glargine (LANTUS) subcutaneous injection 5 Units 0.05 mL  5 Units Subcutaneous HS   • insulin lispro (HumaLOG) 100 units/mL subcutaneous injection 1-5 Units  1-5 Units Subcutaneous TID AC   • insulin lispro (HumaLOG) 100 units/mL subcutaneous injection 1-5 Units  1-5 Units Subcutaneous HS   • insulin lispro (HumaLOG) 100 units/mL subcutaneous injection 5 Units  5 Units Subcutaneous TID With Meals   • latanoprost (XALATAN) 0.005 % ophthalmic solution 1 drop  1 drop Both Eyes HS   • melatonin tablet 6 mg  6 mg Oral HS   • ondansetron (ZOFRAN) injection 4 mg  4 mg Intravenous Once   • ondansetron (ZOFRAN) injection 4 mg  4 mg Intravenous Q4H PRN   • oxyCODONE (ROXICODONE) IR tablet 5 mg  5 mg Oral Q4H PRN   • oxyCODONE (ROXICODONE) split tablet 2.5 mg  2.5 mg Oral Q4H PRN   • polyethylene glycol (MIRALAX) packet 17 g  17 g Oral Daily   • senna (SENOKOT) tablet 17.2 mg  2 tablet Oral HS   • tamsulosin (FLOMAX) capsule 0.4 mg  0.4 mg Oral Daily With Dinner   • timolol (TIMOPTIC) 0.5 % ophthalmic solution 1 drop  1 drop Both Eyes Daily   • torsemide (DEMADEX) tablet 40 mg  40 mg Oral Daily        VTE Pharmacologic Prophylaxis: Heparin  VTE Mechanical Prophylaxis: sequential compression device    Portions of the record may have been created with voice recognition software. Occasional wrong word or "sound a like" substitutions may have occurred due to the inherent limitations of voice recognition software.   Read the chart carefully and recognize, using context, where substitutions have occurred.  ==  Hardik Ceballos MD  [address] [facility]  Internal Medicine Residency PGY-1

## 2023-07-07 NOTE — CASE MANAGEMENT
Case Management Discharge Planning Note    Patient name Singh Mcdonough  Location [address]/[facility] 766-54 MRN [id number]  : 1940 Date 2023       Current Admission Date: 2023  Current Admission Diagnosis:Pericardial effusion   Patient Active Problem List    Diagnosis Date Noted   • Encephalopathy 2023   • Empyema of lung ([address]) 2023   • Hypoglycemia 2023   • Thrombocytopenia ([address]) 2023   • Diarrhea 2023   • Hypothermia 2023   • Pneumonia 2023   • Septic shock ([address]) 2023   • Urinary retention 2023   • Macrocytosis 2023   • Hyponatremia 2023   • Anemia 2023   • Chronic kidney disease-mineral and bone disorder 2023   • Advanced care planning/counseling discussion 2023   • Acute kidney injury superimposed on chronic kidney disease ([address]) 2022   • Benign hypertension with CKD (chronic kidney disease) stage IV ([facility]) 2022   • Persistent proteinuria 2022   • COVID-19 2022   • Electrolyte abnormality 2022   • Cellulitis of left upper extremity 2021   • Atrial fibrillation with slow ventricular response ([address]) 2021   • Vitamin D deficiency 10/18/2019   • Chronic combined systolic and diastolic congestive heart failure ([address]) 2019   • Pericardial effusion 2019   • Leg edema 01/10/2019   • 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 ([address]) 01/10/2019   • PVC (premature ventricular contraction) 2018   • Essential hypertension 2018   • Closed traumatic displaced fracture of distal end of left radius with malunion 2018      LOS (days): 9  Geometric Mean LOS (GMLOS) (days): 4.90  Days to GMLOS:-3.8     OBJECTIVE:  Risk of Unplanned Readmission Score: 28.42         Current admission status: Inpatient   Preferred Pharmacy:   [facility] Shi [address] [address]  Phone: [phone] Fax: [phone]    Primary Care Provider: Kayy Elizondo DO    Primary Insurance: MEDICARE  Secondary Insurance: BLUE CROSS    DISCHARGE DETAILS:                                                                                                 Additional Comments: CM met with pt's sister and brother, they indicate son does not provide much care for pt and cannot understand what care is needed. They were presented with snf list available and decline these choices. First choice is [address] and second is [facility]. Another referral was made to these snfs.  Also discussed with [facility]IM team.

## 2023-07-07 NOTE — TELEMEDICINE
e-Consult (IPC)  - Interventional Radiology  Deyvi Gonzalez 78 y.o. male MRN: [id number]  Unit/Bed#: [facility] 402-01 Encounter: [id number]          Interventional Radiology has been consulted to evaluate Deyvi Gonzalez    We were consulted by pulmonology concerning this patient with chest tube and low outputs. Inpatient Consult to IR  Consult performed by: SUSANA Charles  Consult ordered by: Cecilia Hennessy MD        07/07/23    Assessment/Recommendation:     78 yaer old male transferred from [address] with 10f right sided chest tube placed on 6/24/2023 by IR. Patient has had minimal output for the last several days. Chest x-ray demonstrates resolution of the effusion. Chest tube pulled at bedside. Gauze and tegaderm applied, may remove dressing tomorrow. - right sided chest tube removed at bedside    5-10 minutes, >50% of the total time devoted to medical consultative verbal/EMR discussion between providers. Written report will be generated in the EMR. Thank you for allowing Interventional Radiology to participate in the care of Deyvi Gonzalez. Please don't hesitate to call or TigerText us with any questions.      [address] [facility] Prisca Kilgore

## 2023-07-08 LAB
ANION GAP SERPL CALCULATED.3IONS-SCNC: 4 MMOL/L
BUN SERPL-MCNC: 84 MG/DL (ref 5–25)
CALCIUM SERPL-MCNC: 8.2 MG/DL (ref 8.3–10.1)
CHLORIDE SERPL-SCNC: 101 MMOL/L (ref 96–108)
CO2 SERPL-SCNC: 32 MMOL/L (ref 21–32)
CREAT SERPL-MCNC: 2.13 MG/DL (ref 0.6–1.3)
ERYTHROCYTE [DISTWIDTH] IN BLOOD BY AUTOMATED COUNT: 15.7 % (ref 11.6–15.1)
GFR SERPL CREATININE-BSD FRML MDRD: 27 ML/MIN/1.73SQ M
GLUCOSE SERPL-MCNC: 107 MG/DL (ref 65–140)
GLUCOSE SERPL-MCNC: 110 MG/DL (ref 65–140)
GLUCOSE SERPL-MCNC: 111 MG/DL (ref 65–140)
GLUCOSE SERPL-MCNC: 113 MG/DL (ref 65–140)
GLUCOSE SERPL-MCNC: 177 MG/DL (ref 65–140)
GLUCOSE SERPL-MCNC: 273 MG/DL (ref 65–140)
HCT VFR BLD AUTO: 22.7 % (ref 36.5–49.3)
HEMOCCULT STL QL: POSITIVE
HGB BLD-MCNC: 7.7 G/DL (ref 12–17)
MCH RBC QN AUTO: 35.2 PG (ref 26.8–34.3)
MCHC RBC AUTO-ENTMCNC: 33.9 G/DL (ref 31.4–37.4)
MCV RBC AUTO: 104 FL (ref 82–98)
PLATELET # BLD AUTO: 144 THOUSANDS/UL (ref 149–390)
PMV BLD AUTO: 9.9 FL (ref 8.9–12.7)
POTASSIUM SERPL-SCNC: 4.1 MMOL/L (ref 3.5–5.3)
RBC # BLD AUTO: 2.19 MILLION/UL (ref 3.88–5.62)
SODIUM SERPL-SCNC: 137 MMOL/L (ref 135–147)
WBC # BLD AUTO: 5.5 THOUSAND/UL (ref 4.31–10.16)

## 2023-07-08 PROCEDURE — 85027 COMPLETE CBC AUTOMATED: CPT | Performed by: INTERNAL MEDICINE

## 2023-07-08 PROCEDURE — 82948 REAGENT STRIP/BLOOD GLUCOSE: CPT

## 2023-07-08 PROCEDURE — 99232 SBSQ HOSP IP/OBS MODERATE 35: CPT | Performed by: INTERNAL MEDICINE

## 2023-07-08 PROCEDURE — 80048 BASIC METABOLIC PNL TOTAL CA: CPT

## 2023-07-08 PROCEDURE — 82272 OCCULT BLD FECES 1-3 TESTS: CPT

## 2023-07-08 RX ADMIN — INSULIN LISPRO 5 UNITS: 100 INJECTION, SOLUTION INTRAVENOUS; SUBCUTANEOUS at 08:30

## 2023-07-08 RX ADMIN — INSULIN LISPRO 1 UNITS: 100 INJECTION, SOLUTION INTRAVENOUS; SUBCUTANEOUS at 16:45

## 2023-07-08 RX ADMIN — MELATONIN TAB 3 MG 6 MG: 3 TAB at 21:39

## 2023-07-08 RX ADMIN — INSULIN LISPRO 3 UNITS: 100 INJECTION, SOLUTION INTRAVENOUS; SUBCUTANEOUS at 21:40

## 2023-07-08 RX ADMIN — DOCUSATE SODIUM 100 MG: 100 CAPSULE, LIQUID FILLED ORAL at 08:47

## 2023-07-08 RX ADMIN — INSULIN GLARGINE 5 UNITS: 100 INJECTION, SOLUTION SUBCUTANEOUS at 21:39

## 2023-07-08 RX ADMIN — ATORVASTATIN CALCIUM 40 MG: 40 TABLET, FILM COATED ORAL at 16:44

## 2023-07-08 RX ADMIN — TIMOLOL MALEATE 1 DROP: 5 SOLUTION/ DROPS OPHTHALMIC at 08:50

## 2023-07-08 RX ADMIN — INSULIN LISPRO 5 UNITS: 100 INJECTION, SOLUTION INTRAVENOUS; SUBCUTANEOUS at 16:45

## 2023-07-08 RX ADMIN — TORSEMIDE 40 MG: 20 TABLET ORAL at 08:47

## 2023-07-08 RX ADMIN — LATANOPROST 1 DROP: 50 SOLUTION OPHTHALMIC at 21:40

## 2023-07-08 RX ADMIN — INSULIN LISPRO 5 UNITS: 100 INJECTION, SOLUTION INTRAVENOUS; SUBCUTANEOUS at 12:30

## 2023-07-08 RX ADMIN — Medication 2000 UNITS: at 08:47

## 2023-07-08 RX ADMIN — HEPARIN SODIUM 5000 UNITS: 5000 INJECTION INTRAVENOUS; SUBCUTANEOUS at 05:34

## 2023-07-08 RX ADMIN — HEPARIN SODIUM 5000 UNITS: 5000 INJECTION INTRAVENOUS; SUBCUTANEOUS at 13:53

## 2023-07-08 RX ADMIN — TAMSULOSIN HYDROCHLORIDE 0.4 MG: 0.4 CAPSULE ORAL at 16:44

## 2023-07-08 RX ADMIN — OXYCODONE HYDROCHLORIDE AND ACETAMINOPHEN 500 MG: 500 TABLET ORAL at 08:48

## 2023-07-08 NOTE — ASSESSMENT & PLAN NOTE
·   · Baseline creatinine 2.5-3, creatinine back at base  · Patient is status post pericardiocentesis and diuresis  · Currently on torsemide 40 mg daily

## 2023-07-08 NOTE — PROGRESS NOTES
General Cardiology Progress Note   Zabrina Ochoa 78 y.o. male MRN: [id number]  Unit/Bed#: [facility] 402-01 Encounter: [id number]      Assessment:  Principal Problem:    Pericardial effusion  Active Problems:    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 (HCC)    Chronic combined systolic and diastolic congestive heart failure (HCC)    Atrial fibrillation with slow ventricular response (HCC)    Acute kidney injury superimposed on chronic kidney disease (HCC)    Anemia    Urinary retention    Pneumonia    Encephalopathy      Impression:    • Pneumonia/pleural effusion/chest tube  o Seen by pulmonary yesterday, stated that empyema was not the case  o Completed pneumonia treatment  • pericardial effusion (acute on chronic) status post percutaneous pericardial drain, removed Wednesday, 7/5/2023  o No significant effusion on repeat echo 7/6/2023  • Acute kidney injury- likely driven by sepsis/pneumonia/ATN  • CKD stage IV. • Atrial fibrillation-with chronic slow ventricular response, no AV donnie blockers  • Anemia-multifactorial, significant anemia this admission has precluded ongoing use of anticoagulation from an A-fib standpoint  • Chronic combined systolic/diastolic CHF  o EF chronically about 45%, had last been checked in 2021 until this admission  o Initial EF felt to be 45%, but in the context of large pericardial effusion  o Currently EF felt to be preserved, 55 to 60%  o Weight is down to 139 pounds  o Creatinine continues to improve in the presence of diuresis, 2.13 today  o 1.2 L diuresis yesterday  o Currently on torsemide 40 mg p.o. daily, this is his home dose    Plan:    • No changes from my perspective  • Continue torsemide    Subjective:   Patient seen and examined. He has no complaints for me today    Review of Systems   Constitutional: Negative for diaphoresis, fever, malaise/fatigue and night sweats.    Cardiovascular: Negative for chest pain, dyspnea on exertion, leg swelling, orthopnea, palpitations and syncope. Respiratory: Negative for cough, shortness of breath, sputum production and wheezing. Skin: Negative for itching and rash. Gastrointestinal: Negative for abdominal pain, change in bowel habit and diarrhea. Neurological: Negative for dizziness, focal weakness, light-headedness and weakness. Objective   Vitals: Blood pressure 113/91, pulse 93, temperature 98.3 °F (36.8 °C), resp. rate 17, height 5' 9" (1.753 m), weight 63.4 kg (139 lb 12.4 oz), SpO2 98 %. , Body mass index is 20.64 kg/m²., I/O last 3 completed shifts: In: 7123 [P.O.:1585]  Out: 2600 [Urine:2550; Chest Tube:50]  I/O this shift:  In: -   Out: 800 [Urine:800]  Wt Readings from Last 3 Encounters:   07/08/23 63.4 kg (139 lb 12.4 oz)   06/28/23 74.5 kg (164 lb 3.9 oz)   05/11/23 66.7 kg (147 lb)       Intake/Output Summary (Last 24 hours) at 7/8/2023 0637  Last data filed at 7/8/2023 0507  Gross per 24 hour   Intake 915 ml   Output 1225 ml   Net -310 ml     I/O last 3 completed shifts: In: 6179 [P.O.:1585]  Out: 2600 [Urine:2550; Chest Tube:50]    Physical Exam  Constitutional:       General: He is not in acute distress. Appearance: He is not diaphoretic. HENT:      Head: Normocephalic and atraumatic. Neck:      Vascular: No JVD. Cardiovascular:      Rate and Rhythm: Normal rate. Rhythm irregular. Heart sounds: Normal heart sounds. No murmur heard. Pulmonary:      Effort: Pulmonary effort is normal. No respiratory distress. Breath sounds: Normal breath sounds. No wheezing or rales. Abdominal:      General: Bowel sounds are normal. There is no distension. Palpations: Abdomen is soft. Tenderness: There is no abdominal tenderness. Musculoskeletal:      Cervical back: Normal range of motion and neck supple. Right lower leg: Edema present. Left lower leg: Edema present. Skin:     General: Skin is warm and dry.    Neurological:      Mental Status: He is alert and oriented to person, place, and time.          Meds/Allergies   Allergies   Allergen Reactions   • Elemental Sulfur    • Sulfa Antibiotics        Current Facility-Administered Medications:   •  acetaminophen (TYLENOL) tablet 650 mg, 650 mg, Oral, Q4H PRN, Vega Sutton PA-C  •  ascorbic acid (VITAMIN C) tablet 500 mg, 500 mg, Oral, Daily, Collette Ashing, PA-C, 500 mg at 07/07/23 7164  •  atorvastatin (LIPITOR) tablet 40 mg, 40 mg, Oral, Daily With Tevin Fagan PA-C, 40 mg at 07/07/23 1704  •  cholecalciferol (VITAMIN D3) tablet 2,000 Units, 2,000 Units, Oral, Daily, Collette Ashing, PA-C, 2,000 Units at 07/07/23 9099  •  docusate sodium (COLACE) capsule 100 mg, 100 mg, Oral, BID, Collette Ashing, PA-C, 100 mg at 07/07/23 0840  •  heparin (porcine) subcutaneous injection 5,000 Units, 5,000 Units, Subcutaneous, Q8H [facility], Sriram Beth MD, 5,000 Units at 07/08/23 0534  •  HYDROmorphone HCl (DILAUDID) injection 0.2 mg, 0.2 mg, Intravenous, Q4H PRN, Vega Sutton PA-C  •  insulin glargine (LANTUS) subcutaneous injection 5 Units 0.05 mL, 5 Units, Subcutaneous, HS, Halle Dominguez MD, 5 Units at 07/07/23 2128  •  insulin lispro (HumaLOG) 100 units/mL subcutaneous injection 1-5 Units, 1-5 Units, Subcutaneous, TID AC, 1 Units at 07/07/23 1704 **AND** Fingerstick Glucose (POCT), , , TID ACShazia CRNP  •  insulin lispro (HumaLOG) 100 units/mL subcutaneous injection 1-5 Units, 1-5 Units, Subcutaneous, HS, SUSANA Kearney, 2 Units at 07/07/23 2130  •  insulin lispro (HumaLOG) 100 units/mL subcutaneous injection 5 Units, 5 Units, Subcutaneous, TID With Meals, Sriram Beth MD, 5 Units at 07/07/23 1704  •  latanoprost (XALATAN) 0.005 % ophthalmic solution 1 drop, 1 drop, Both Eyes, HS, Collette Ashing, PA-C, 1 drop at 07/07/23 2129  •  melatonin tablet 6 mg, 6 mg, Oral, HS, Bebo Cary PA-C, 6 mg at 07/07/23 2129  •  ondansetron [facility]) injection 4 mg, 4 mg, Intravenous, Once, SUSANA Loomis  •  ondansetron [facility]) injection 4 mg, 4 mg, Intravenous, Q4H PRN, TIM Barber-C  •  oxyCODONE (ROXICODONE) IR tablet 5 mg, 5 mg, Oral, Q4H PRN, TIM Barber-C, 5 mg at 07/05/23 7394  •  oxyCODONE (ROXICODONE) split tablet 2.5 mg, 2.5 mg, Oral, Q4H PRN, Fredy Sylvester PA-C, 2.5 mg at 07/05/23 0049  •  polyethylene glycol (MIRALAX) packet 17 g, 17 g, Oral, Daily, TIM Bernard-AYDEE, 17 g at 07/07/23 2668  •  senna (SENOKOT) tablet 17.2 mg, 2 tablet, Oral, HS, Daisy Fritz PA-C, 17.2 mg at 07/03/23 2226  •  tamsulosin (FLOMAX) capsule 0.4 mg, 0.4 mg, Oral, Daily With Mary Jane Rainey PA-C, 0.4 mg at 07/07/23 1704  •  timolol (TIMOPTIC) 0.5 % ophthalmic solution 1 drop, 1 drop, Both Eyes, Daily, Daisy Fritz PA-C, 1 drop at 07/07/23 0843  •  torsemide (DEMADEX) tablet 40 mg, 40 mg, Oral, Daily, Daisy Fritz PA-C, 40 mg at 07/07/23 0840    Laboratory Results:        CBC with diff:   Results from last 7 days   Lab Units 07/08/23  0413 07/07/23  0445 07/06/23  0419 07/05/23  0428 07/04/23  0450 07/03/23  0449 07/02/23  0423   WBC Thousand/uL 5.50 5.35 6.59 7.39 11.22* 12.60* 12.56*   HEMOGLOBIN g/dL 7.7* 7.1* 7.2* 7.4* 7.9* 7.9* 8.7*   HEMATOCRIT % 22.7* 22.7* 23.1* 22.5* 23.5* 22.9* 26.4*   MCV fL 104* 107* 107* 103* 102* 101* 104*   PLATELETS Thousands/uL 144* 137* 139* 122* 126* 97* 81*   RBC Million/uL 2.19* 2.13* 2.16* 2.18* 2.30* 2.26* 2.54*   MCH pg 35.2* 33.3 33.3 33.9 34.3 35.0* 34.3   MCHC g/dL 33.9 31.3* 31.2* 32.9 33.6 34.5 33.0   RDW % 15.7* 15.4* 15.3* 15.2* 15.2* 15.1 15.5*   MPV fL 9.9 10.0 10.1 10.1 9.9 10.6 11.5   NRBC AUTO /100 WBCs  --  0 0 0 0 0 0       CMP:  Results from last 7 days   Lab Units 07/08/23  0413 07/07/23  0445 07/06/23  0419 07/05/23  0428 07/04/23  0450 07/03/23  0449 07/02/23  0423   SODIUM mmol/L 137 134* 133* 133* 137 134* 134* POTASSIUM mmol/L 4.1 3.8 3.9 4.1 4.0 4.3 4.8   CHLORIDE mmol/L 101 100 99 100 103 101 101   CO2 mmol/L 32 30 29 30 28 28 29   BUN mg/dL 84* 93* 99* 102* 109* 116* 92*   CREATININE mg/dL 2.13* 2.25* 2.41* 2.54* 2.72* 3.00* 2.99*   CALCIUM mg/dL 8.2* 7.9* 8.0* 8.0* 8.4 8.1* 8.4   EGFR ml/min/1.73sq m 27 25 23 22 20 18 18       BMP:  Results from last 7 days   Lab Units 07/08/23  0413 07/07/23  0445 07/06/23  0419 07/05/23  0428 07/04/23  0450 07/03/23 0449 07/02/23  0423   SODIUM mmol/L 137 134* 133* 133* 137 134* 134*   POTASSIUM mmol/L 4.1 3.8 3.9 4.1 4.0 4.3 4.8   CHLORIDE mmol/L 101 100 99 100 103 101 101   CO2 mmol/L 32 30 29 30 28 28 29   BUN mg/dL 84* 93* 99* 102* 109* 116* 92*   CREATININE mg/dL 2.13* 2.25* 2.41* 2.54* 2.72* 3.00* 2.99*   CALCIUM mg/dL 8.2* 7.9* 8.0* 8.0* 8.4 8.1* 8.4       NT-proBNP: No results for input(s): "NTBNP" in the last 72 hours. Magnesium:   Results from last 7 days   Lab Units 07/02/23  0423 07/01/23  1219   MAGNESIUM mg/dL 2.4 2.3       Coags:       TSH:        Hemoglobin A1C )      Lipid Profile:   No results found for: "CHOL"  Lab Results   Component Value Date    HDL 37 (A) 08/20/2021    HDL 34 (L) 01/15/2019     Lab Results   Component Value Date    LDLCALC 54 01/15/2019     Lab Results   Component Value Date    LDLDIRECT 14 08/20/2021     Lab Results   Component Value Date    TRIG 63 08/20/2021    TRIG 70 01/15/2019       Cardiac testing:   EKG personally reviewed by Yovani Warren MD.     Cath:  ECHO:  Stress TEST:  Other:        Yovani Warren MD    Portions of the record may have been created with voice recognition software. Occasional wrong word or "sound a like" substitutions may have occurred due to the inherent limitations of voice recognition software. Read the chart carefully and recognize, using context, where substitutions have occurred.

## 2023-07-08 NOTE — ASSESSMENT & PLAN NOTE
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

## 2023-07-08 NOTE — ASSESSMENT & PLAN NOTE
· Status post chest tube placement by interventional radiology 6/24  · He got tPA dornase 6/25  · Finished 7 days of cefepime for pneumonia  · Effusion was transudative, suspect related to his pericardial effusion  · Evaluated by pulmonology, appreciate recommendation  · Chest tube was removed by interventional radiology 7/7  · Continue incentive spirometry

## 2023-07-08 NOTE — ASSESSMENT & PLAN NOTE
· Dupont placed for urinary retention 6/28  · Voiding trial initiated 7/7  · Patient so far voiding well  · Continue Flomax

## 2023-07-08 NOTE — ASSESSMENT & PLAN NOTE
Lab Results   Component Value Date    HGBA1C 8.3 (H) 02/23/2023       Recent Labs     07/08/23  0544 07/08/23  0603 07/08/23  1030 07/08/23  1543   POCGLU 111 113 110 177*       Blood Sugar Average: Last 72 hrs:  (P) 170.8125   · Continue Lantus 5 units at bedtime with sliding scale

## 2023-07-08 NOTE — PROGRESS NOTES
[facility]  Progress Note  Name: Yonatan Pozo  MRN: [id number]  [facility] I Date of Admission: 6/28/2023   Date of Service: 7/8/2023 I Hospital Day: 10    Assessment/Plan   * Pericardial effusion  Assessment & Plan  · Large acute on chronic pericardial effusion concerning for possible early hemodynamic changes for tamponade  · Sp pericardiocentesis 6/30,  · Cultures are negative  · Repeat echo 7/3/2023 showed small loculated pericardial effusion at the apex, small effusion along the RA free wall  · Pericardial drain removed 7/5  · Repeat echo 7/6 showed no pericardial effusion, pericardium was markedly thickened 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  · Cardiology on board, appreciate recommendation    Chronic combined systolic and diastolic congestive heart failure (HCC)  Assessment & Plan  Wt Readings from Last 3 Encounters:   07/08/23 63.4 kg (139 lb 12.4 oz)   06/28/23 74.5 kg (164 lb 3.9 oz)   05/11/23 66.7 kg (147 lb)     · Diuretics held on admission in the setting of shock and KARINA  · Torsemide 40 mg daily restarted per cardiology  · I/O, daily weight, low-sodium diet with fluid restriction         Pleural effusion, right side  Assessment & Plan  · Status post chest tube placement by interventional radiology 6/24  · He got tPA dornase 6/25  · Finished 7 days of cefepime for pneumonia  · Effusion was transudative, suspect related to his pericardial effusion  · Evaluated by pulmonology, appreciate recommendation  · Chest tube was removed by interventional radiology 7/7  · Continue incentive spirometry      Atrial fibrillation with slow ventricular response ([address])  Assessment & Plan  · Eliquis on hold currently due to dropping hemoglobin  · Reviewing with nurses, he had a black/brown stool, check FOBT      Anemia  Assessment & Plan  · Hemoglobin 11.0 present on admission  · Hemoglobin in the range of low 7, has been dropping for the past few days  · Platelet count improved  · Per nursing patient seems to have a black/brown stool  · Check FOBT  · No other signs of bleeding    Urinary retention  Assessment & Plan  · Dupont placed for urinary retention 6/28  · Voiding trial initiated 7/7  · Patient so far voiding well  · Continue Flomax    Encephalopathy  Assessment & Plan  · Likely toxic/metabolic secondary to critical illness and delirium  · Continue delirium precautions  · Patient with difficulty sleeping  · Melatonin and mirtazapine were added and critical care  · Significantly improved    Acute kidney injury superimposed on chronic kidney disease ([address])  Assessment & Plan  ·   · Baseline creatinine 2.5-3, creatinine back at base  · Patient is status post pericardiocentesis and diuresis  · Currently on torsemide 40 mg daily        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 ([address])  Assessment & Plan  Lab Results   Component Value Date    HGBA1C 8.3 (H) 02/23/2023       Recent Labs     07/08/23  0544 07/08/23  0603 07/08/23  1030 07/08/23  1543   POCGLU 111 113 110 177*       Blood Sugar Average: Last 72 hrs:  (P) 170.8125   · Continue Lantus 5 units at bedtime with sliding scale         VTE Pharmacologic Prophylaxis:   Moderate Risk (Score 3-4) - Pharmacological DVT Prophylaxis Contraindicated. Sequential Compression Devices Ordered. Patient Centered Rounds: I performed bedside rounds with nursing staff today. Discussions with Specialists or Other Care Team Provider: Nursing    Education and Discussions with Family / Patient: Updated  (son) via phone.     Total Time Spent on Date of Encounter in care of patient: 35 minutes This time was spent on one or more of the following: performing physical exam; counseling and coordination of care; obtaining or reviewing history; documenting in the medical record; reviewing/ordering tests, medications or procedures; communicating with other healthcare professionals and discussing with patient's family/caregivers. Current Length of Stay: 10 day(s)  Current Patient Status: Inpatient   Certification Statement: The patient will continue to require additional inpatient hospital stay due to Dropping hemoglobin  Discharge Plan: Anticipate discharge in 48-72 hrs to rehab facility. Code Status: Level 3 - DNAR and DNI    Subjective:   Patient was seen and evaluated bedside. Overnight patient had black/brown stool. Patient is comfortable, has no complaint    Objective:     Vitals:   Temp (24hrs), Av °F (36.7 °C), Min:97.1 °F (36.2 °C), Max:98.5 °F (36.9 °C)    Temp:  [97.1 °F (36.2 °C)-98.5 °F (36.9 °C)] 97.1 °F (36.2 °C)  HR:  [77-93] 82  Resp:  [20] 20  BP: ()/(55-91) 130/61  SpO2:  [97 %-99 %] 98 %  Body mass index is 20.64 kg/m². Input and Output Summary (last 24 hours): Intake/Output Summary (Last 24 hours) at 2023 1615  Last data filed at 2023 1500  Gross per 24 hour   Intake 1795 ml   Output 1413 ml   Net 382 ml       Physical Exam:   Physical Exam  Constitutional:       General: He is not in acute distress. Comments: Elderly, frail   HENT:      Head: Atraumatic. Cardiovascular:      Rate and Rhythm: Normal rate and regular rhythm. Heart sounds: No murmur heard. No friction rub. No gallop. Pulmonary:      Effort: Pulmonary effort is normal. No respiratory distress. Breath sounds: Normal breath sounds. No wheezing. Abdominal:      General: Bowel sounds are normal. There is no distension. Palpations: Abdomen is soft. Tenderness: There is no abdominal tenderness. Musculoskeletal:         General: No swelling. Cervical back: Neck supple. Skin:     General: Skin is warm and dry. Neurological:      General: No focal deficit present. Mental Status: He is alert.    Psychiatric:         Mood and Affect: Mood normal.          Additional Data:     Labs:  Results from last 7 days   Lab Units 23  0413 23  0445   WBC Thousand/uL 5.50 5.35   HEMOGLOBIN g/dL 7.7* 7.1*   HEMATOCRIT % 22.7* 22.7*   PLATELETS Thousands/uL 144* 137*   NEUTROS PCT %  --  69   LYMPHS PCT %  --  20   MONOS PCT %  --  10   EOS PCT %  --  1     Results from last 7 days   Lab Units 07/08/23  0413   SODIUM mmol/L 137   POTASSIUM mmol/L 4.1   CHLORIDE mmol/L 101   CO2 mmol/L 32   BUN mg/dL 84*   CREATININE mg/dL 2.13*   ANION GAP mmol/L 4   CALCIUM mg/dL 8.2*   GLUCOSE RANDOM mg/dL 107         Results from last 7 days   Lab Units 07/08/23  1543 07/08/23  1030 07/08/23  0603 07/08/23  0544 07/07/23  2102 07/07/23  1615 07/07/23  1046 07/07/23  0556 07/06/23  2054 07/06/23  1612 07/06/23  1035 07/06/23  0540   POC GLUCOSE mg/dl 177* 110 113 111 253* 182* 260* 103 242* 136 193* 81               Lines/Drains:  Invasive Devices     Peripheral Intravenous Line  Duration           Long-Dwell Peripheral IV (Midline) 42/84/46 Right Basilic 13 days                      Imaging: Reviewed radiology reports from this admission including: chest xray and ECHO    Recent Cultures (last 7 days):         Last 24 Hours Medication List:   Current Facility-Administered Medications   Medication Dose Route Frequency Provider Last Rate   • acetaminophen  650 mg Oral Q4H PRN Raymond Reyes PA-C     • ascorbic acid  500 mg Oral Daily Maicol Aguiar PA-C     • atorvastatin  40 mg Oral Daily With [employer] Ra Tristan PA-C     • cholecalciferol  2,000 Units Oral Daily Maicol Aguiar PA-C     • docusate sodium  100 mg Oral BID Maicol Aguiar PA-C     • HYDROmorphone  0.2 mg Intravenous Q4H PRN Raymond Reyes PA-C     • insulin glargine  5 Units Subcutaneous HS Haim Elizondo MD     • insulin lispro  1-5 Units Subcutaneous TID AC SUSANA Sanders     • insulin lispro  1-5 Units Subcutaneous HS SUSANA Sanders     • insulin lispro  5 Units Subcutaneous TID With Meals Haim Elizondo MD     • latanoprost  1 drop Both Eyes HS Geeta Ventura Leonid Ramirez PA-C     • melatonin  6 mg Oral HS Daysi Ruano PA-C     • ondansetron  4 mg Intravenous Once Gertrude Horse, CRNP     • ondansetron  4 mg Intravenous Q4H PRN Evon Messer PA-C     • oxyCODONE  5 mg Oral Q4H PRN Evon Messer PA-C     • oxyCODONE  2.5 mg Oral Q4H PRN Evon Messer PA-C     • polyethylene glycol  17 g Oral Daily Susan Ash PA-C     • senna  2 tablet Oral HS Susan Ash PA-C     • tamsulosin  0.4 mg Oral Daily With [employer], PA-C     • timolol  1 drop Both Eyes Daily Susan Ash PA-C     • torsemide  40 mg Oral Daily Susan Ash PA-C          Today, Patient Was Seen By: Francine Boland MD    **Please Note: This note may have been constructed using a voice recognition system. **

## 2023-07-08 NOTE — ASSESSMENT & PLAN NOTE
· Likely toxic/metabolic secondary to critical illness and delirium  · Continue delirium precautions  · Patient with difficulty sleeping  · Melatonin and mirtazapine were added and critical care  · Significantly improved

## 2023-07-08 NOTE — ASSESSMENT & PLAN NOTE
· Hemoglobin 11.0 present on admission  · Hemoglobin in the range of low 7, has been dropping for the past few days  · Platelet count improved  · Per nursing patient seems to have a black/brown stool  · Check FOBT  · No other signs of bleeding

## 2023-07-08 NOTE — ASSESSMENT & PLAN NOTE
· Eliquis on hold currently due to dropping hemoglobin  · Reviewing with nurses, he had a black/brown stool, check FOBT

## 2023-07-08 NOTE — ASSESSMENT & PLAN NOTE
Wt Readings from Last 3 Encounters:   07/08/23 63.4 kg (139 lb 12.4 oz)   06/28/23 74.5 kg (164 lb 3.9 oz)   05/11/23 66.7 kg (147 lb)     · Diuretics held on admission in the setting of shock and KARINA  · Torsemide 40 mg daily restarted per cardiology  · I/O, daily weight, low-sodium diet with fluid restriction

## 2023-07-09 LAB
ANION GAP SERPL CALCULATED.3IONS-SCNC: 6 MMOL/L
BUN SERPL-MCNC: 80 MG/DL (ref 5–25)
CALCIUM SERPL-MCNC: 8.3 MG/DL (ref 8.3–10.1)
CHLORIDE SERPL-SCNC: 100 MMOL/L (ref 96–108)
CO2 SERPL-SCNC: 30 MMOL/L (ref 21–32)
CREAT SERPL-MCNC: 2.22 MG/DL (ref 0.6–1.3)
ERYTHROCYTE [DISTWIDTH] IN BLOOD BY AUTOMATED COUNT: 15.8 % (ref 11.6–15.1)
FERRITIN SERPL-MCNC: 136 NG/ML (ref 24–336)
GFR SERPL CREATININE-BSD FRML MDRD: 26 ML/MIN/1.73SQ M
GLUCOSE SERPL-MCNC: 110 MG/DL (ref 65–140)
GLUCOSE SERPL-MCNC: 112 MG/DL (ref 65–140)
GLUCOSE SERPL-MCNC: 142 MG/DL (ref 65–140)
GLUCOSE SERPL-MCNC: 206 MG/DL (ref 65–140)
GLUCOSE SERPL-MCNC: 285 MG/DL (ref 65–140)
GLUCOSE SERPL-MCNC: 290 MG/DL (ref 65–140)
HCT VFR BLD AUTO: 23 % (ref 36.5–49.3)
HEMOCCULT STL QL: ABNORMAL
HEMOCCULT STL QL: ABNORMAL
HEMOCCULT STL QL: POSITIVE
HGB BLD-MCNC: 7.6 G/DL (ref 12–17)
IRON SATN MFR SERPL: 16 % (ref 20–50)
IRON SERPL-MCNC: 33 UG/DL (ref 65–175)
MCH RBC QN AUTO: 34.2 PG (ref 26.8–34.3)
MCHC RBC AUTO-ENTMCNC: 33 G/DL (ref 31.4–37.4)
MCV RBC AUTO: 104 FL (ref 82–98)
PLATELET # BLD AUTO: 139 THOUSANDS/UL (ref 149–390)
PMV BLD AUTO: 9.4 FL (ref 8.9–12.7)
POTASSIUM SERPL-SCNC: 4 MMOL/L (ref 3.5–5.3)
RBC # BLD AUTO: 2.22 MILLION/UL (ref 3.88–5.62)
SODIUM SERPL-SCNC: 136 MMOL/L (ref 135–147)
TIBC SERPL-MCNC: 209 UG/DL (ref 250–450)
WBC # BLD AUTO: 4.5 THOUSAND/UL (ref 4.31–10.16)

## 2023-07-09 PROCEDURE — 83540 ASSAY OF IRON: CPT | Performed by: INTERNAL MEDICINE

## 2023-07-09 PROCEDURE — 82948 REAGENT STRIP/BLOOD GLUCOSE: CPT

## 2023-07-09 PROCEDURE — 85027 COMPLETE CBC AUTOMATED: CPT | Performed by: INTERNAL MEDICINE

## 2023-07-09 PROCEDURE — 99222 1ST HOSP IP/OBS MODERATE 55: CPT | Performed by: INTERNAL MEDICINE

## 2023-07-09 PROCEDURE — 83550 IRON BINDING TEST: CPT | Performed by: INTERNAL MEDICINE

## 2023-07-09 PROCEDURE — 82728 ASSAY OF FERRITIN: CPT | Performed by: INTERNAL MEDICINE

## 2023-07-09 PROCEDURE — 80048 BASIC METABOLIC PNL TOTAL CA: CPT | Performed by: INTERNAL MEDICINE

## 2023-07-09 PROCEDURE — C9113 INJ PANTOPRAZOLE SODIUM, VIA: HCPCS | Performed by: INTERNAL MEDICINE

## 2023-07-09 PROCEDURE — 99232 SBSQ HOSP IP/OBS MODERATE 35: CPT | Performed by: INTERNAL MEDICINE

## 2023-07-09 PROCEDURE — 82272 OCCULT BLD FECES 1-3 TESTS: CPT | Performed by: INTERNAL MEDICINE

## 2023-07-09 RX ORDER — PANTOPRAZOLE SODIUM 40 MG/10ML
40 INJECTION, POWDER, LYOPHILIZED, FOR SOLUTION INTRAVENOUS EVERY 12 HOURS SCHEDULED
Status: DISCONTINUED | OUTPATIENT
Start: 2023-07-09 | End: 2023-07-11 | Stop reason: HOSPADM

## 2023-07-09 RX ORDER — PANTOPRAZOLE SODIUM 40 MG/1
40 TABLET, DELAYED RELEASE ORAL
Status: DISCONTINUED | OUTPATIENT
Start: 2023-07-09 | End: 2023-07-09

## 2023-07-09 RX ADMIN — INSULIN LISPRO 3 UNITS: 100 INJECTION, SOLUTION INTRAVENOUS; SUBCUTANEOUS at 16:05

## 2023-07-09 RX ADMIN — TIMOLOL MALEATE 1 DROP: 5 SOLUTION/ DROPS OPHTHALMIC at 09:04

## 2023-07-09 RX ADMIN — INSULIN LISPRO 1 UNITS: 100 INJECTION, SOLUTION INTRAVENOUS; SUBCUTANEOUS at 21:05

## 2023-07-09 RX ADMIN — INSULIN GLARGINE 5 UNITS: 100 INJECTION, SOLUTION SUBCUTANEOUS at 21:05

## 2023-07-09 RX ADMIN — Medication 2000 UNITS: at 08:58

## 2023-07-09 RX ADMIN — TORSEMIDE 40 MG: 20 TABLET ORAL at 09:00

## 2023-07-09 RX ADMIN — OXYCODONE HYDROCHLORIDE AND ACETAMINOPHEN 500 MG: 500 TABLET ORAL at 09:00

## 2023-07-09 RX ADMIN — LATANOPROST 1 DROP: 50 SOLUTION OPHTHALMIC at 21:05

## 2023-07-09 RX ADMIN — PANTOPRAZOLE SODIUM 40 MG: 40 TABLET, DELAYED RELEASE ORAL at 09:00

## 2023-07-09 RX ADMIN — PANTOPRAZOLE SODIUM 40 MG: 40 INJECTION, POWDER, FOR SOLUTION INTRAVENOUS at 21:06

## 2023-07-09 RX ADMIN — TAMSULOSIN HYDROCHLORIDE 0.4 MG: 0.4 CAPSULE ORAL at 16:05

## 2023-07-09 RX ADMIN — MELATONIN TAB 3 MG 6 MG: 3 TAB at 21:06

## 2023-07-09 RX ADMIN — INSULIN LISPRO 3 UNITS: 100 INJECTION, SOLUTION INTRAVENOUS; SUBCUTANEOUS at 11:58

## 2023-07-09 RX ADMIN — INSULIN LISPRO 5 UNITS: 100 INJECTION, SOLUTION INTRAVENOUS; SUBCUTANEOUS at 16:10

## 2023-07-09 RX ADMIN — INSULIN LISPRO 5 UNITS: 100 INJECTION, SOLUTION INTRAVENOUS; SUBCUTANEOUS at 09:04

## 2023-07-09 RX ADMIN — INSULIN LISPRO 5 UNITS: 100 INJECTION, SOLUTION INTRAVENOUS; SUBCUTANEOUS at 11:59

## 2023-07-09 RX ADMIN — ATORVASTATIN CALCIUM 40 MG: 40 TABLET, FILM COATED ORAL at 16:04

## 2023-07-09 NOTE — PLAN OF CARE
Problem: Prexisting or High Potential for Compromised Skin Integrity  Goal: Skin integrity is maintained or improved  Description: INTERVENTIONS:  - Identify patients at risk for skin breakdown  - Assess and monitor skin integrity  - Assess and monitor nutrition and hydration status  - Monitor labs   - Assess for incontinence   - Turn and reposition patient  - Assist with mobility/ambulation  - Relieve pressure over bony prominences  - Avoid friction and shearing  - Provide appropriate hygiene as needed including keeping skin clean and dry  - Evaluate need for skin moisturizer/barrier cream  - Collaborate with interdisciplinary team   - Patient/family teaching  - Consider wound care consult   Outcome: Progressing     Problem: MOBILITY - ADULT  Goal: Maintain or return to baseline ADL function  Description: INTERVENTIONS:  -  Assess patient's ability to carry out ADLs; assess patient's baseline for ADL function and identify physical deficits which impact ability to perform ADLs (bathing, care of mouth/teeth, toileting, grooming, dressing, etc.)  - Assess/evaluate cause of self-care deficits   - Assess range of motion  - Assess patient's mobility; develop plan if impaired  - Assess patient's need for assistive devices and provide as appropriate  - Encourage maximum independence but intervene and supervise when necessary  - Involve family in performance of ADLs  - Assess for home care needs following discharge   - Consider OT consult to assist with ADL evaluation and planning for discharge  - Provide patient education as appropriate  Outcome: Progressing     Problem: PAIN - ADULT  Goal: Verbalizes/displays adequate comfort level or baseline comfort level  Description: Interventions:  - Encourage patient to monitor pain and request assistance  - Assess pain using appropriate pain scale  - Administer analgesics based on type and severity of pain and evaluate response  - Implement non-pharmacological measures as appropriate and evaluate response  - Consider cultural and social influences on pain and pain management  - Notify physician/advanced practitioner if interventions unsuccessful or patient reports new pain  Outcome: Progressing     Problem: INFECTION - ADULT  Goal: Absence or prevention of progression during hospitalization  Description: INTERVENTIONS:  - Assess and monitor for signs and symptoms of infection  - Monitor lab/diagnostic results  - Monitor all insertion sites, i.e. indwelling lines, tubes, and drains  - Monitor endotracheal if appropriate and nasal secretions for changes in amount and color  - [place] appropriate cooling/warming therapies per order  - Administer medications as ordered  - Instruct and encourage patient and family to use good hand hygiene technique  - Identify and instruct in appropriate isolation precautions for identified infection/condition  Outcome: Progressing

## 2023-07-09 NOTE — PROGRESS NOTES
[address] [place] [address]  Progress Note  Name: Milan Mora  MRN: [id number]  [facility] Date of Admission: 6/28/2023   Date of Service: 7/9/2023 I Hospital Day: 11    Assessment/Plan   * Pericardial effusion  Assessment & Plan  · Large acute on chronic pericardial effusion concerning for possible early hemodynamic changes for tamponade  · Sp pericardiocentesis 6/30  · Cultures are negative  · Repeat echo 7/3/2023 showed small loculated pericardial effusion at the apex, small effusion along the RA free wall  · Pericardial drain removed 7/5  · Repeat echo 7/6 showed no pericardial effusion, pericardium was markedly thickened 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  · Cardiology on board, appreciate recommendation    Chronic combined systolic and diastolic congestive heart failure (HCC)  Assessment & Plan  Wt Readings from Last 3 Encounters:   07/09/23 63.9 kg (140 lb 14 oz)   06/28/23 74.5 kg (164 lb 3.9 oz)   05/11/23 66.7 kg (147 lb)     · Diuretics held on admission in the setting of shock and KARINA  · Torsemide 40 mg daily restarted per cardiology  · I/O, daily weight, low-sodium diet with fluid restriction         Pleural effusion, right side  Assessment & Plan  · Status post chest tube placement by interventional radiology 6/24  · He got tPA dornase 6/25  · Finished 7 days of cefepime for pneumonia  · Effusion was transudative  · Evaluated by pulmonology, appreciate recommendation  · Chest tube was removed by interventional radiology 7/7  · Continue incentive spirometry      Atrial fibrillation with slow ventricular response (HCC)  Assessment & Plan  · Eliquis on hold currently due to dropping hemoglobin  · Not on AV donnie blocking agents  · Now FOBT positive      Anemia  Assessment & Plan  · Hemoglobin 11.0 on admission  · Hemoglobin in the range of low 7, has been dropping   · Platelet count improved  · Nursing noticed black stool  · Component of acute illness, chronic kidney disease, now positive FOBT  · GI consult  · PPI twice daily    Urinary retention  Assessment & Plan  · Dupont placed for urinary retention 6/28  · Voiding trial initiated 7/7  · Patient so far voiding well  · Continue Flomax    Encephalopathy  Assessment & Plan  · Likely toxic/metabolic secondary to critical illness and delirium  · Continue delirium precautions  · Patient with difficulty sleeping  · Melatonin and mirtazapine were added and critical care  · Significantly improved    Acute kidney injury superimposed on chronic kidney disease ([address])  Assessment & Plan  · Baseline creatinine 2.5-3, creatinine back at base  · Patient is status post pericardiocentesis and diuresis  · Currently on torsemide 40 mg daily        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 ([address])  Assessment & Plan  Lab Results   Component Value Date    HGBA1C 8.3 (H) 02/23/2023       Recent Labs     07/08/23  1543 07/08/23  2044 07/09/23  0540 07/09/23  0705   POCGLU 177* 273* 110 142*       Blood Sugar Average: Last 72 hrs:  (P) [id number]   · Continue Lantus 5 units at bedtime with sliding scale             VTE Pharmacologic Prophylaxis:   Moderate Risk (Score 3-4) - Pharmacological DVT Prophylaxis Contraindicated. Sequential Compression Devices Ordered. Patient Centered Rounds: I performed bedside rounds with nursing staff today. Discussions with Specialists or Other Care Team Provider: Nursing    Education and Discussions with Family / Patient: Attempted to update  (son) via phone. Unable to contact.   At 11:28 AM    Total Time Spent on Date of Encounter in care of patient: 35 minutes This time was spent on one or more of the following: performing physical exam; counseling and coordination of care; obtaining or reviewing history; documenting in the medical record; reviewing/ordering tests, medications or procedures; communicating with other healthcare professionals and discussing with patient's family/caregivers. Current Length of Stay: 11 day(s)  Current Patient Status: Inpatient   Certification Statement: The patient will continue to require additional inpatient hospital stay due to Anemia  Discharge Plan: Anticipate discharge in 48-72 hrs to rehab facility. Code Status: Level 3 - DNAR and DNI    Subjective:   Patient was seen and evaluated bedside. Overnight he has a black stool, FOBT was checked and was positive. Patient denies chest pain palpitation shortness of breath, he is comfortable    Objective:     Vitals:   Temp (24hrs), Av.7 °F (36.5 °C), Min:97.1 °F (36.2 °C), Max:98.3 °F (36.8 °C)    Temp:  [97.1 °F (36.2 °C)-98.3 °F (36.8 °C)] 97.6 °F (36.4 °C)  HR:  [82-95] 92  Resp:  [14] 14  BP: (122-130)/(61-63) 122/61  SpO2:  [97 %-99 %] 99 %  Body mass index is 20.8 kg/m². Input and Output Summary (last 24 hours): Intake/Output Summary (Last 24 hours) at 2023 1128  Last data filed at 2023 0900  Gross per 24 hour   Intake 720 ml   Output 989 ml   Net -269 ml       Physical Exam:   Physical Exam  Constitutional:       General: He is not in acute distress. Comments: Elderly, frail, chronically ill-appearing   HENT:      Head: Atraumatic. Cardiovascular:      Rate and Rhythm: Normal rate. Rhythm irregular. Pulmonary:      Effort: Pulmonary effort is normal. No respiratory distress. Breath sounds: Normal breath sounds. No wheezing. Abdominal:      General: Bowel sounds are normal. There is no distension. Palpations: Abdomen is soft. Tenderness: There is no abdominal tenderness. Musculoskeletal:      Comments: Minimal bilateral lower extremities with   Skin:     General: Skin is warm and dry. Neurological:      General: No focal deficit present. Mental Status: He is alert.    Psychiatric:         Mood and Affect: Mood normal.          Additional Data:     Labs:  Results from last 7 days   Lab Units 23  0568 07/08/23  0413 07/07/23  0445   WBC Thousand/uL 4.50   < > 5.35   HEMOGLOBIN g/dL 7.6*   < > 7.1*   HEMATOCRIT % 23.0*   < > 22.7*   PLATELETS Thousands/uL 139*   < > 137*   NEUTROS PCT %  --   --  69   LYMPHS PCT %  --   --  20   MONOS PCT %  --   --  10   EOS PCT %  --   --  1    < > = values in this interval not displayed.      Results from last 7 days   Lab Units 07/09/23  0508   SODIUM mmol/L 136   POTASSIUM mmol/L 4.0   CHLORIDE mmol/L 100   CO2 mmol/L 30   BUN mg/dL 80*   CREATININE mg/dL 2.22*   ANION GAP mmol/L 6   CALCIUM mg/dL 8.3   GLUCOSE RANDOM mg/dL 112         Results from last 7 days   Lab Units 07/09/23  0705 07/09/23  0540 07/08/23  2044 07/08/23  1543 07/08/23  1030 07/08/23  0603 07/08/23  0544 07/07/23  2102 07/07/23  1615 07/07/23  1046 07/07/23  0556 07/06/23  2054   POC GLUCOSE mg/dl 142* 110 273* 177* 110 113 111 253* 182* 260* 103 242*               Lines/Drains:  Invasive Devices     Peripheral Intravenous Line  Duration           Long-Dwell Peripheral IV (Midline) 32/33/66 Right Basilic 14 days                      Imaging: Reviewed radiology reports from this admission including: chest xray    Recent Cultures (last 7 days):         Last 24 Hours Medication List:   Current Facility-Administered Medications   Medication Dose Route Frequency Provider Last Rate   • acetaminophen  650 mg Oral Q4H PRN Raymond Reyes PA-C     • ascorbic acid  500 mg Oral Daily Maicol Aguiar PA-C     • atorvastatin  40 mg Oral Daily With [employer] Ra Tristan PA-C     • cholecalciferol  2,000 Units Oral Daily Maicol Aguiar PA-C     • docusate sodium  100 mg Oral BID Maicol Aguiar PA-C     • HYDROmorphone  0.2 mg Intravenous Q4H PRN Raymond Reyes PA-C     • insulin glargine  5 Units Subcutaneous HS Haim Elizondo MD     • insulin lispro  1-5 Units Subcutaneous TID AC SUSANA Shah     • insulin lispro  1-5 Units Subcutaneous HS SUSANA Sanders     • insulin lispro  5 Units Subcutaneous TID With Meals Mirian Tejada MD     • latanoprost  1 drop Both Eyes HS Elba Rome PA-C     • melatonin  6 mg Oral HS Tae Goodman PA-C     • ondansetron  4 mg Intravenous Once SUSANA Cristina     • ondansetron  4 mg Intravenous Q4H PRN Christina Qiu PA-C     • oxyCODONE  5 mg Oral Q4H PRN Christina Qiu PA-C     • oxyCODONE  2.5 mg Oral Q4H PRN Christina Qiu PA-C     • pantoprazole  40 mg Oral BID AC Mirian Tejada MD     • polyethylene glycol  17 g Oral Daily Elba Rome PA-C     • senna  2 tablet Oral HS Elba Rome PA-C     • tamsulosin  0.4 mg Oral Daily With [employer], PA-C     • timolol  1 drop Both Eyes Daily Elba Rome PA-C     • torsemide  40 mg Oral Daily Elba Rome PA-C          Today, Patient Was Seen By: Mirian Tejada MD    **Please Note: This note may have been constructed using a voice recognition system. ** DISPLAY PLAN FREE TEXT

## 2023-07-09 NOTE — PROGRESS NOTES
General Cardiology Progress Note   Inga Cash 78 y.o. male MRN: [id number]  Unit/Bed#: [facility] 402-01 Encounter: [id number]      Assessment:  Principal Problem:    Pericardial effusion  Active Problems:    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 (HCC)    Chronic combined systolic and diastolic congestive heart failure (HCC)    Atrial fibrillation with slow ventricular response (HCC)    Acute kidney injury superimposed on chronic kidney disease (HCC)    Anemia    Urinary retention    Pleural effusion, right side    Encephalopathy      Impression:    • Pneumonia/pleural effusion/chest tube  o Seen by pulmonary yesterday, stated that empyema was not the case  o Completed pneumonia treatment  • pericardial effusion (acute on chronic) status post percutaneous pericardial drain, removed Wednesday, 7/5/2023  o No significant effusion on repeat echo 7/6/2023   o Remains clinically stable  • Acute kidney injury- likely driven by sepsis/pneumonia/ATN  o Resolved  • CKD stage IV.   • Atrial fibrillation-with chronic slow ventricular response, no AV donnie blockers  o Not on anticoagulation currently due to significant anemia  • Anemia- multifactorial, renal failure, chronic disease  o Hemoglobin 7.6  o Needs to be off anticoagulation from an A-fib perspective  • Chronic combined systolic/diastolic CHF  o EF chronically about 45%, had last been checked in 2021 until this admission  o Initial EF felt to be 45%, but in the context of large pericardial effusion  o EF recently 55 to 60%, upon personal review I agree  o Weight has plateaued at 881 414 pounds  o Creatinine 2.13 yesterday, 2.2 today  o 1.1 L diuresis on current home dose torsemide 40 mg p.o. daily with improving volume status    Plan:    • No changes today, continues to be stable, creatinine stable at 2.2  • Electrolytes stable  • Blood pressure adequate  • He is stable to proceed with endoscopy for GI bleed work-up    Subjective: Patient seen and examined. No complaints for me today other than leg edema still present    Review of Systems   Constitutional: Negative for diaphoresis, fever, malaise/fatigue and night sweats. Cardiovascular: Positive for leg swelling. Negative for chest pain, dyspnea on exertion, orthopnea, palpitations and syncope. Respiratory: Negative for cough, shortness of breath, sputum production and wheezing. Skin: Negative for itching and rash. Gastrointestinal: Negative for abdominal pain, change in bowel habit and diarrhea. Neurological: Negative for dizziness, focal weakness, light-headedness and weakness. Objective   Vitals: Blood pressure 122/61, pulse 92, temperature 97.6 °F (36.4 °C), resp. rate 14, height 5' 9" (1.753 m), weight 63.9 kg (140 lb 14 oz), SpO2 99 %. , Body mass index is 20.8 kg/m²., I/O last 3 completed shifts: In: 1800 [P.O.:1800]  Out: 1919 [Urine:1919]  I/O this shift:  In: 360 [P.O.:360]  Out: 170 [Urine:170]  Wt Readings from Last 3 Encounters:   07/09/23 63.9 kg (140 lb 14 oz)   06/28/23 74.5 kg (164 lb 3.9 oz)   05/11/23 66.7 kg (147 lb)       Intake/Output Summary (Last 24 hours) at 7/9/2023 1011  Last data filed at 7/9/2023 0900  Gross per 24 hour   Intake 1920 ml   Output 1289 ml   Net 631 ml     I/O last 3 completed shifts: In: 1800 [P.O.:1800]  Out: 1919 [Urine:1919]    Physical Exam  Constitutional:       General: He is not in acute distress. Appearance: He is not diaphoretic. HENT:      Head: Normocephalic and atraumatic. Neck:      Vascular: No JVD. Cardiovascular:      Rate and Rhythm: Normal rate. Rhythm irregular. Heart sounds: Normal heart sounds. No murmur heard. Pulmonary:      Effort: Pulmonary effort is normal. No respiratory distress. Breath sounds: Normal breath sounds. No wheezing or rales. Abdominal:      General: Bowel sounds are normal. There is no distension. Palpations: Abdomen is soft. Tenderness:  There is no abdominal tenderness. Musculoskeletal:      Cervical back: Normal range of motion and neck supple. Right lower leg: Edema present. Left lower leg: Edema present. Skin:     General: Skin is warm and dry. Neurological:      Mental Status: He is alert and oriented to person, place, and time.          Meds/Allergies   Allergies   Allergen Reactions   • Elemental Sulfur    • Sulfa Antibiotics        Current Facility-Administered Medications:   •  acetaminophen (TYLENOL) tablet 650 mg, 650 mg, Oral, Q4H PRN, Wava Sauce, PA-C  •  ascorbic acid (VITAMIN C) tablet 500 mg, 500 mg, Oral, Daily, Laurell Scales, PA-C, 500 mg at 07/09/23 0900  •  atorvastatin (LIPITOR) tablet 40 mg, 40 mg, Oral, Daily With Tyler Galea, PA-C, 40 mg at 07/08/23 1644  •  cholecalciferol (VITAMIN D3) tablet 2,000 Units, 2,000 Units, Oral, Daily, Laurell Scales, PA-C, 2,000 Units at 07/09/23 3281  •  docusate sodium (COLACE) capsule 100 mg, 100 mg, Oral, BID, Laurell Scales, PA-C, 100 mg at 07/08/23 3490  •  HYDROmorphone HCl (DILAUDID) injection 0.2 mg, 0.2 mg, Intravenous, Q4H PRN, Wava Sauce, PA-C  •  insulin glargine (LANTUS) subcutaneous injection 5 Units 0.05 mL, 5 Units, Subcutaneous, HS, Halle Dominguez MD, 5 Units at 07/08/23 2139  •  insulin lispro (HumaLOG) 100 units/mL subcutaneous injection 1-5 Units, 1-5 Units, Subcutaneous, TID AC, 1 Units at 07/08/23 1645 **AND** Fingerstick Glucose (POCT), , , TID ACMaria Luz CRNP  •  insulin lispro (HumaLOG) 100 units/mL subcutaneous injection 1-5 Units, 1-5 Units, Subcutaneous, HS, SUSANA Abbott, 3 Units at 07/08/23 2140  •  insulin lispro (HumaLOG) 100 units/mL subcutaneous injection 5 Units, 5 Units, Subcutaneous, TID With Meals, Bert Wallace MD, 5 Units at 07/09/23 0904  •  latanoprost (XALATAN) 0.005 % ophthalmic solution 1 drop, 1 drop, Both Eyes, HS, Anjum Cervantes, CORRIE, 1 drop at 07/08/23 2140  •  melatonin tablet 6 mg, 6 mg, Oral, HS, Martina Olivo, PA-C, 6 mg at 07/08/23 2139  •  ondansetron (ZOFRAN) injection 4 mg, 4 mg, Intravenous, Once, SUSANA Child  •  [facility]ans[facility]ron [facility]) injection 4 mg, 4 mg, Intravenous, Q4H PRN, [place] Pyatt, PA-C  •  oxyCODONE (ROXICODONE) IR tablet 5 mg, 5 mg, Oral, Q4H PRN, [place] Pyatt, PA-C, 5 mg at 07/05/23 3711  •  oxyCODONE (ROXICODONE) split tablet 2.5 mg, 2.5 mg, Oral, Q4H PRN, [place] Pyatt, PA-C, 2.5 mg at 07/05/23 0049  •  pantoprazole (PROTONIX) EC tablet 40 mg, 40 mg, Oral, BID AC, Halle Dominguez MD, 40 mg at 07/09/23 0900  •  polyethylene glycol (MIRALAX) packet 17 g, 17 g, Oral, Daily, E. I. samson Bains PA-C, 17 g at 07/07/23 2411  •  senna (SENOKOT) tablet 17.2 mg, 2 tablet, Oral, HS, E. I. samson Bains PA-C, 17.2 mg at 07/03/23 2226  •  tamsulosin (FLOMAX) capsule 0.4 mg, 0.4 mg, Oral, Daily With Deja Yeboah PA-C, 0.4 mg at 07/08/23 1644  •  timolol (TIMOPTIC) 0.5 % ophthalmic solution 1 drop, 1 drop, Both Eyes, Daily, E. I. samson Bains PA-C, 1 drop at 07/09/23 8266  •  torsemide (DEMADEX) tablet 40 mg, 40 mg, Oral, Daily, E. I. TIM Patiño-AYDEE, 40 mg at 07/09/23 0900    Laboratory Results:        CBC with diff:   Results from last 7 days   Lab Units 07/09/23  0508 07/08/23  0413 07/07/23  0445 07/06/23  0419 07/05/23  0428 07/04/23  0450 07/03/23  0449   WBC Thousand/uL 4.50 5.50 5.35 6.59 7.39 11.22* 12.60*   HEMOGLOBIN g/dL 7.6* 7.7* 7.1* 7.2* 7.4* 7.9* 7.9*   HEMATOCRIT % 23.0* 22.7* 22.7* 23.1* 22.5* 23.5* 22.9*   MCV fL 104* 104* 107* 107* 103* 102* 101*   PLATELETS Thousands/uL 139* 144* 137* 139* 122* 126* 97*   RBC Million/uL 2.22* 2.19* 2.13* 2.16* 2.18* 2.30* 2.26*   MCH pg 34.2 35.2* 33.3 33.3 33.9 34.3 35.0*   MCHC g/dL 33.0 33.9 31.3* 31.2* 32.9 33.6 34.5   RDW % 15.8* 15.7* 15.4* 15.3* 15.2* 15.2* 15.1   MPV fL 9.4 9.9 10.0 10.1 10.1 9.9 10.6   NRBC AUTO /100 WBCs  -- --  0 0 0 0 0       CMP:  Results from last 7 days   Lab Units 07/09/23  0508 07/08/23  0413 07/07/23  0445 07/06/23  0419 07/05/23  0428 07/04/23  0450 07/03/23  0449   SODIUM mmol/L 136 137 134* 133* 133* 137 134*   POTASSIUM mmol/L 4.0 4.1 3.8 3.9 4.1 4.0 4.3   CHLORIDE mmol/L 100 101 100 99 100 103 101   CO2 mmol/L 30 32 30 29 30 28 28   BUN mg/dL 80* 84* 93* 99* 102* 109* 116*   CREATININE mg/dL 2.22* 2.13* 2.25* 2.41* 2.54* 2.72* 3.00*   CALCIUM mg/dL 8.3 8.2* 7.9* 8.0* 8.0* 8.4 8.1*   EGFR ml/min/1.73sq m 26 27 25 23 22 20 18       BMP:  Results from last 7 days   Lab Units 07/09/23  0508 07/08/23  0413 07/07/23  0445 07/06/23  0419 07/05/23  0428 07/04/23  0450 07/03/23  0449   SODIUM mmol/L 136 137 134* 133* 133* 137 134*   POTASSIUM mmol/L 4.0 4.1 3.8 3.9 4.1 4.0 4.3   CHLORIDE mmol/L 100 101 100 99 100 103 101   CO2 mmol/L 30 32 30 29 30 28 28   BUN mg/dL 80* 84* 93* 99* 102* 109* 116*   CREATININE mg/dL 2.22* 2.13* 2.25* 2.41* 2.54* 2.72* 3.00*   CALCIUM mg/dL 8.3 8.2* 7.9* 8.0* 8.0* 8.4 8.1*       NT-proBNP: No results for input(s): "NTBNP" in the last 72 hours. Magnesium:         Coags:       TSH:        Hemoglobin A1C )      Lipid Profile:   No results found for: "CHOL"  Lab Results   Component Value Date    HDL 37 (A) 08/20/2021    HDL 34 (L) 01/15/2019     Lab Results   Component Value Date    LDLCALC 54 01/15/2019     Lab Results   Component Value Date    LDLDIRECT 14 08/20/2021     Lab Results   Component Value Date    TRIG 63 08/20/2021    TRIG 70 01/15/2019       Cardiac testing:   EKG personally reviewed by Thalia Tello MD.     Cath:  ECHO:  Stress TEST:  Other:        Thalia Tello MD    Portions of the record may have been created with voice recognition software. Occasional wrong word or "sound a like" substitutions may have occurred due to the inherent limitations of voice recognition software.   Read the chart carefully and recognize, using context, where substitutions have occurred.

## 2023-07-09 NOTE — ASSESSMENT & PLAN NOTE
· Baseline creatinine 2.5-3, creatinine back at base  · Patient is status post pericardiocentesis and diuresis  · Currently on torsemide 40 mg daily

## 2023-07-09 NOTE — ASSESSMENT & PLAN NOTE
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

## 2023-07-09 NOTE — ASSESSMENT & PLAN NOTE
· Status post chest tube placement by interventional radiology 6/24  · He got tPA dornase 6/25  · Finished 7 days of cefepime for pneumonia  · Effusion was transudative  · Evaluated by pulmonology, appreciate recommendation  · Chest tube was removed by interventional radiology 7/7  · Continue incentive spirometry

## 2023-07-09 NOTE — ASSESSMENT & PLAN NOTE
· Hemoglobin 11.0 on admission  · Hemoglobin in the range of low 7, has been dropping   · Platelet count improved  · Nursing noticed black stool  · Component of acute illness, chronic kidney disease, now positive FOBT  · GI consult  · PPI twice daily

## 2023-07-09 NOTE — CONSULTS
CONSULT: GASTROENTEROLOGY          Inpatient consult to gastroenterology     Performed by  Nieves Richards MD   Authorized by Mirian Tejada MD           PATIENT INFORMATION      Santos Siu 78 y.o. male MRN: [id number]  Unit/Bed#: [facility] 402-01 Encounter: [id number]  PCP: Jovanni Hannon DO  Date of Admission:  6/28/2023  Date of Consultation: 07/09/23  Requesting Physician: Mirian Tejada MD    ASSESSMENTS & PLAN   Santos Siu is a 78 y.o. old male with multiple medical co-morbidities currently admitted with transudative pleural effusion s/p chest tube which is now removed, pericardial effusion s/p drainage, pneumonia s/p antibiotic therapy, Afib not on AC due to anemia and melena, thrombocytopenia who is now noted to have melena and anemia. Gastroenterology team has been consulted for assistance with management of melena, anemia. 1. Melena  2. Acute anemia  BL Hb of around 11-12, down to 7.1, improved to 7.6 without transfusion. Hemodynamics stable. BUN/Cr 80/2.2. FOBT positive. Not on [facility] given this. No prior EGD or colonoscopy. - frequent vitals, hb check, transfusion as needed, active type and screen  - given significant co-morbidities, will need cardiac clearance prior to endoscopic evaluation  - plan for EGD based on clinical progression, likely tomorrow, NPO at midnight, clears for now  - check iron panel    GI will follow    HISTORY OF PRESENT ILLNESS      Santos Siu is a 78 y.o. male who is originally admitted for pleural effusion on 6/28/2023. GI team is consulted for  Melena. 79 yo M with multiple comorbidities initially admitted with pleural effusion initially thought to be empyema s/p chest tube and now removed, PNA s/p treatment, pericardial effusion s/p drainage, EF 45%, afib who is now noted to have melena. On my encounter denying any complaints. Hemodynamics stable. On room air.  Hb at baseline near normal, noted to have drop in hb over 1 week upto edgardo thus far of 7.1. Denies any complaints at present. REVIEW OF SYSTEMS     A thorough 12-point review of systems has been conducted. Pertinent positives and negatives are mentioned in the history of present illness. PAST MEDICAL & SURGICAL HISTORY      Past Medical History:   Diagnosis Date   • Atrial fibrillation [facility])    • Chronic kidney disease    • Coronary artery disease    • Diabetes mellitus ([facility])    • Hyperlipidemia    • Hypertension        Past Surgical History:   Procedure Laterality Date   • CARDIAC CATHETERIZATION N/A 6/30/2023    Procedure: Cardiac pericardiocentesis; Surgeon: Rebekah Simpson DO;  Location: [facility] CARDIAC CATH LAB; Service: Cardiology   • CARDIAC CATHETERIZATION N/A 6/30/2023    Procedure: Cardiac RHC; Surgeon: Rebekah Simpson DO;  Location: [facility] CARDIAC CATH [facility]; Service: Cardiology   • EYE SURGERY     • IR CHEST TUBE PLACEMENT  6/24/2023   • SKIN CANCER EXCISION     • TONSILLECTOMY         MEDICATIONS & ALLERGIES       Medications:   Prior to Admission medications    Medication Sig Start Date End Date Taking?  Authorizing Provider   allopurinol (ZYLOPRIM) 100 mg tablet Take 100 mg by mouth 2 (two) times a day    Historical Provider, MD   ALPLACIEZolam Pierre Ritchie) 0.5 mg tablet 0.5 mg daily at bedtime  3/12/18   Historical Provider, MD   ascorbic acid (VITAMIN C) 500 MG tablet Take 1 tablet (500 mg total) by mouth daily 1/8/21   Davis Myles MD   atorvastatin (LIPITOR) 40 mg tablet Take 1 tablet (40 mg total) by mouth daily with dinner 1/16/19   Phoebe Perez DO   Blood Pressure Monitor NILTON by Does not apply route daily 2/19/20   Varsha Gomez MD   carvedilol (COREG) 6.25 mg tablet Take 1 tablet (6.25 mg total) by mouth 2 (two) times a day with meals 1/16/19   Phoebe Perez DO   cholecalciferol (VITAMIN D3) 1,000 units tablet Take 1 tablet (1,000 Units total) by mouth daily  Patient taking differently: Take 2,000 Units by mouth daily 10/18/19   Varsha Gomez MD   Eliquis 2.5 MG TAKE ONE TABLET BY MOUTH TWICE A DAY 7/15/22   Erickson Ramos DO   glimepiride (AMARYL) 2 mg tablet Take 1 tablet (2 mg total) by mouth daily with dinner 2/27/23   Janeal Ahumada, MD   glimepiride (AMARYL) 4 mg tablet Take 1 tablet (4 mg total) by mouth daily with breakfast 2/27/23   Janeal Ahumada, MD   isosorbide mononitrate (IMDUR) 30 mg 24 hr tablet Take 1 tablet (30 mg total) by mouth daily 1/17/19   Phoebe Perez DO   latanoprost (XALATAN) 0.005 % ophthalmic solution 1 drop daily at bedtime    Historical Provider, MD   sitaGLIPtin (JANUVIA) 25 mg tablet Take 1 tablet (25 mg total) by mouth daily Do not start before February 28, 2023. 2/28/23   Janeal Ahumada, MD   tamsulosin (FLOMAX) 0.4 mg Take 0.4 mg by mouth daily with dinner    Historical Provider, MD   timolol (TIMOPTIC) 0.5 % ophthalmic solution Administer 1 drop to both eyes daily 1/7/21   Janeal Ahumada, MD   Torsemide 40 MG TABS Take 40 mg by mouth in the morning 3/23/23   Erickson Ramos DO   ZINC OXIDE PO Take by mouth daily    Historical Provider, MD       Allergies: Allergies   Allergen Reactions   • Elemental Sulfur    • Sulfa Antibiotics        SOCIAL HISTORY      Substance Use History:   Social History     Substance and Sexual Activity   Alcohol Use Not Currently   • Alcohol/week: 0.0 standard drinks of alcohol    Comment: special occasions     Social History     Tobacco Use   Smoking Status Former   Smokeless Tobacco Former     Social History     Substance and Sexual Activity   Drug Use Not Currently       FAMILY HISTORY      As in the HPI.      PHYSICAL EXAM     Vitals:   Blood Pressure: 122/61 (07/09/23 0708)  Pulse: 92 (07/09/23 0708)  Temperature: 97.6 °F (36.4 °C) (07/09/23 0708)  Temp Source: Axillary (07/05/23 0710)  Respirations: 14 (07/09/23 0708)  Height: 5' 9" (175.3 cm) (07/06/23 0940)  Weight - Scale: 63.9 kg (140 lb 14 oz) (07/09/23 0600)  SpO2: 99 % (07/09/23 0800)    Physical Exam:   GENERAL: NAD  HEENT:  Atraumatic  CARDIAC:  RRR on palpation  PULMONARY:  non-labored breathing  ABDOMEN: non tender  RECTAL:  deferred  NEUROLOGIC:  Alert/oriented x3. EXTREMITIES: chronic changes  SKIN:  chronic changes    ADDITIONAL DATA     Lab Results:     Results from last 7 days   Lab Units 07/09/23  0508 07/08/23  0413 07/07/23  0445   WBC Thousand/uL 4.50   < > 5.35   HEMOGLOBIN g/dL 7.6*   < > 7.1*   HEMATOCRIT % 23.0*   < > 22.7*   PLATELETS Thousands/uL 139*   < > 137*   NEUTROS PCT %  --   --  69   LYMPHS PCT %  --   --  20   MONOS PCT %  --   --  10   EOS PCT %  --   --  1    < > = values in this interval not displayed. Results from last 7 days   Lab Units 07/09/23  0508   POTASSIUM mmol/L 4.0   CHLORIDE mmol/L 100   CO2 mmol/L 30   BUN mg/dL 80*   CREATININE mg/dL 2.22*   CALCIUM mg/dL 8.3           Imaging:    XR chest portable    Result Date: 7/7/2023  Narrative: CHEST INDICATION:   eval empyema. COMPARISON: Compared with 7/1/2023 EXAM PERFORMED/VIEWS:  XR CHEST PORTABLE FINDINGS: Interval removal of [place] catheter and pericardial catheter Moderate cardiomegaly improved from prior study Left lung parenchyma is clear. Decreased right lung volume. Right lung base pigtail catheter seen in place. Opacity surrounding the catheter loop. No pneumothorax. Osseous structures appear within normal limits for patient age. Impression: Cardiomegaly improved with removal of pericardial catheter Unchanged appearance of the opacities in the right lung base with pigtail catheter in place and may suggest atelectasis. Workstation performed: [id number]     Echo follow up/limited w/ contrast if indicated    Result Date: 7/6/2023  Narrative: •  IVS: There is interventricular septal "bounce". This was a technically difficult study. The left ventricle appears normal in size and systolic function. There is concentric remodeling. There is biatrial dilation. Patient is in atrial fibrillation. There is no pericardial effusion. The pericardium is markedly thickened. There is marked ventricular septal motion abnormality. 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 Echo follow up/limited w/ contrast if indicated    Result Date: 7/3/2023  Narrative: •  Left Ventricle: Left ventricular cavity size is normal. The left ventricular ejection fraction is 60% by visual estimation. Although no diagnostic regional wall motion abnormality was identified, this possibility cannot be completely excluded on the basis of this study. •  IVS: There is interventricular septal "bounce". •  Pericardium: There is a small loculated pericardial effusion at the apex. There is a small effusion along the RA free wall. Technically difficult study. XR chest portable ICU    Result Date: 7/2/2023  Narrative: CHEST INDICATION:   Post pericardial tap - indwelling  cath place. COMPARISON: CXR 6/26/2023 and chest CT 6/26/2023. EXAM PERFORMED/VIEWS:  XR CHEST PORTABLE ICU. FINDINGS: Right jugular pulmonary artery catheter in right interlobar pulmonary artery. Persistent enlargement of the cardiac silhouette due to pericardial effusion, slightly decreased, with pericardial drain. Pigtail catheter over the right lung base with persistent small loculated right hydropneumothorax. Small left effusion. Moderate right base atelectasis. Upper abdomen normal. Bones normal for age. Impression: Pericardial drain with slight decrease in marked enlargement of cardiac silhouette from pericardial effusion. Persistent small loculated right basilar hydropneumothorax and small left effusion. Persistent right base atelectasis. Workstation performed: [id number]     XR chest portable ICU    Result Date: 7/1/2023  Narrative: CHEST INDICATION:   hypoxia. COMPARISON: CXR 6/30/2023 and chest CT 6/24/2023. EXAM PERFORMED/VIEWS:  XR CHEST PORTABLE ICU. FINDINGS: Right jugular PA catheter in right interlobar pulmonary artery. Enlargement of the cardiac silhouette due to pericardial effusion.  Pigtail catheter over the right base with persistent small loculated right hydropneumothorax. Moderate left effusion with bibasilar atelectasis. Upper abdomen normal. Bones normal for age. Impression: Persistent small loculated right hydropneumothorax with moderate left effusion and bibasilar atelectasis. Pulmonary artery catheter in right interlobar pulmonary artery, subsequently retracted. Workstation performed: [id number]     XR chest portable ICU    Result Date: 7/1/2023  Narrative: CHEST INDICATION:   PA catheter placement. COMPARISON: CXR 7/1/2023 and chest CT 6/24/2023. EXAM PERFORMED/VIEWS:  XR CHEST PORTABLE ICU. FINDINGS: Right jugular pulmonary artery catheter in main pulmonary artery. Persistent enlargement of the cardiac silhouette shown on CT to be due to a pericardial effusion. Pericardial drain. Pigtail catheter over right base with stable small loculated right basilar hydropneumothorax and mild bibasilar atelectasis. Upper abdomen normal. Bones normal for age. Impression: Pulmonary artery catheter in main pulmonary artery. Persistent enlargement of the cardiac silhouette with pericardial drain. Right pleural pigtail catheter with stable small loculated right hydropneumothorax and mild bibasilar atelectasis. Workstation performed: [id number]     Echo follow up/limited w/ contrast if indicated    Result Date: 7/1/2023  Narrative: •  Left Ventricle: Left ventricular cavity size is normal. The left ventricular ejection fraction is 60%. Systolic function is normal. •  Left Atrium: The atrium is dilated. •  Right Atrium: The atrium is dilated. •  Aortic Valve: There is mild regurgitation. •  Tricuspid Valve: There is mild regurgitation. The right ventricular systolic pressure is mildly elevated. The estimated right ventricular systolic pressure is 64.06 mmHg. •  Pericardium: There is a large, loculated pericardial effusion at the apex. The fluid contains focal strands. The effusion is small around the RA.  There is no echocardiographic evidence of tamponade. Compared to the prior study, the size of the pericardial effusion has decreased. However, there is still a large, loculated pericardial effusion predominantly at the apex. No signs of tamponade. 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 The fluid exhibits no internal echoes. Echo follow up/limited w/ contrast if indicated    Result Date: 6/29/2023  Narrative: •  Left Ventricle: Wall thickness is mildly increased. There is concentric remodeling. The left ventricular ejection fraction is 70%. Systolic function is vigorous. Wall motion is normal. •  Right Ventricle: Right ventricular cavity size is small. •  Left Atrium: The atrium is dilated. •  Right Atrium: The atrium is mildly dilated. •  Aortic Valve: There is mild regurgitation. •  Tricuspid Valve: There is mild regurgitation. The right ventricular systolic pressure is moderately elevated. The estimated right ventricular systolic pressure is 31.88 mmHg. •  Pericardium: There is a huge pericardial effusion circumferential to the heart. The largest diameter measures 3.8 cm. The fluid is likely hemodynamically significant. Echocardiographic signs of tamponade are likely masked by atrial fibrillation and pulmonary hypertension. The pericardium is thickened. XR chest portable ICU    Result Date: 6/26/2023  Narrative: CHEST INDICATION:   follow up effusion. COMPARISON: June 23, 2023 EXAM PERFORMED/VIEWS:  XR CHEST PORTABLE ICU FINDINGS: Right-sided pleural drainage catheter seen.  Cardiomegaly seen Mild blunting of the left CP angle Osseous structures appear within normal limits for patient age. Impression: Right-sided pleural drainage catheter is seen with a decreasing right effusion No worsening Residual right effusion/atelectasis seen No pneumothorax seen Workstation performed: [id number]     IR chest tube placement    Result Date: 6/25/2023  Narrative: INDICATION: Concern for right-sided empyema PROCEDURE: 1.  Placement of right pigtail chest tube FINDINGS: 1. Minimally septated anechoic right pleural effusion 2.  10 [place] pigtail right-sided chest tube placed, connected to Pleur-evac     Impression: Right pigtail chest tube placement into minimally complex pleural effusion. Yellow fluid obtained and sent for laboratory analysis. _______________________________________________________________ COMPARISON: CT chest 6/24/2023 PROCEDURE DETAILS: Operators: Dr. Oliva Shone Anesthesia: Local Medications: 1% lidocaine Contrast: 0 mL of Omnipaque 300 Fluoroscopy time: 0 minutes COMMENTS: The patient was placed in an upright position. A preprocedure timeout was performed per [facility] protocol. Ultrasound evaluation demonstrated a minimally complex right pleural effusion. The right back was prepped and draped in the usual sterile fashion. All elements of maximal sterile barrier technique were followed (cap, mask, sterile gown, sterile gloves, large sterile sheet, hand hygiene, and 2% chlorhexidine for cutaneous antisepsis). Lidocaine was administered to the skin, and a small skin incision  was made. Under direct ultrasound guidance, an 18 gauge needle was advanced into the pleural fluid. Guidewire was advanced through the needle, needle was removed and a 10 [place]ian pigtail chest tube was advanced over the wire with pigtail formed in the pleural collection. Tube was then sutured the skin with 2-0 Prolene and connected to Pleur-evac. Sterile dressings were applied.  Workstation performed: [id number]     XR chest 1 view portable    Result Date: 6/24/2023  Narrative: CHEST INDICATION:   shortness of breath. COMPARISON: CXR 1/7/2022 and chest CT 6/24/2023. EXAM PERFORMED/VIEWS:  XR CHEST PORTABLE. FINDINGS: Enlargement of the cardiac silhouette due to cardiomegaly and pericardial effusion per CT. Right base opacity due to loculated effusion and rounded atelectasis per CT. No pneumothorax. Lungs clear. No effusion or pneumothorax. Upper abdomen normal. Bones normal for age. Impression: Enlargement of the cardiac silhouette due to cardiomegaly and pericardial effusion per CT Right base opacity due to loculated right effusion and right lower lobe rounded atelectasis per CT. Workstation performed: [id number]     Echo follow up/limited w/ contrast if indicated    Result Date: 6/24/2023  Narrative: •  Left Ventricle: Left ventricular cavity size is normal. Wall thickness is mildly increased. There is mild concentric hypertrophy. The left ventricular ejection fraction is 45% by visual estimation. Systolic function is mildly reduced. There is mild global hypokinesis. •  IVS: There is excessive respiratory change in interventricular septal motion. Consider cardiac tamponade, mechanical ventilation or other causes. •  Right Ventricle: Wall thickness is increased. •  Left Atrium: The atrium is severely dilated. •  Right Atrium: The atrium is mildly dilated. •  Aortic Valve: There is mild regurgitation. •  Mitral Valve: There is mild regurgitation. •  Tricuspid Valve: There is mild regurgitation. PA pressure around 40 to 45 mmHg. •  IVC/SVC: The inferior vena cava is normal in size. Respirophasic changes were blunted (less than 50% variation). •  Pericardium: There is a large pericardial effusion circumferential to the heart. The fluid exhibits no internal echoes. In some views circumferential diameter is around 3 cm. As compared to previous study of 2019 -2021 effusion has increased.   No dense of diastolic collapse of RV and right atrial collapse noted consistent with tamponade. •  As compared to previous studies pericardial fusion have now large. No evidence of diastolic collapse of RV noted. CT chest abdomen pelvis wo contrast    Result Date: 6/24/2023  Narrative: CT CHEST, ABDOMEN AND PELVIS WITHOUT IV CONTRAST INDICATION:   Sepsis sepsis, diarrhea. hx of pericardial effusion. COMPARISON:  None. TECHNIQUE: CT examination of the chest, abdomen and pelvis was performed without intravenous contrast. Multiplanar 2D reformatted images were created from the source data. This examination, like all CT scans performed in the [facility], was performed utilizing techniques to minimize radiation dose exposure, including the use of iterative reconstruction and automated exposure control. Radiation dose length product (DLP) for this visit:  612 mGy-cm Enteric contrast was not administered. FINDINGS: CHEST LUNGS: Opacity at the left lower lung base likely due to atelectasis. Dense opacity within the right lower lobe may be due to atelectasis versus pneumonia (series 6, image 67). PLEURA: There is a small right pleural effusion with demonstrates a thickened enhancing periphery which may represent empyema (series 2, image 95). HEART/GREAT VESSELS: Coronary artery calcifications. There is a large pericardial effusion. Mild atherosclerotic calcifications of the thoracic aorta. MEDIASTINUM AND DAHIANA:  Unremarkable. CHEST WALL AND LOWER NECK:  Unremarkable. ABDOMEN LIVER/BILIARY TREE:  Unremarkable. GALLBLADDER: There is cholelithiasis. There is gallbladder wall thickening/pericholecystic fluid. SPLEEN:  Unremarkable. PANCREAS:  Unremarkable. ADRENAL GLANDS:  Unremarkable. KIDNEYS/URETERS:  Unremarkable. No hydronephrosis. STOMACH AND BOWEL: No bowel obstruction. Scattered colonic diverticulosis. Left inguinal hernia contains a loop of proximal sigmoid colon. Stool-filled ascending and transverse colon.  APPENDIX:  No findings to suggest appendicitis. ABDOMINOPELVIC CAVITY:  No ascites. No pneumoperitoneum. No lymphadenopathy. VESSELS: Moderate atherosclerotic calcifications. PELVIS REPRODUCTIVE ORGANS:  Unremarkable for patient's age. URINARY BLADDER:  Unremarkable. ABDOMINAL WALL/INGUINAL REGIONS: Anasarca. Left inguinal hernia containing a loop of nonobstructed proximal sigmoid colon. Small fat-containing right inguinal hernia. OSSEOUS STRUCTURES:  No acute fracture or destructive osseous lesion. Spinal degenerative changes are noted. Small sclerotic lesion within the left iliac bone (series 2, image 172). Bone island within the right iliac bone (series 2, image 193). Impression: 1. Small right pleural effusion with thickened enhancing periphery which may represent empyema. Dense opacity within the right lower lobe may be due to atelectasis versus pneumonia. Opacity at the right lung base likely due to atelectasis. 2.  Large pericardial effusion. Anasarca. 3.  Cholelithiasis. Gallbladder wall thickening/fluid may be due to volume overload. Further evaluation with HIDA scan may be obtained if there is clinical concern for cholecystitis. 4.  Left inguinal hernia contains a loop of nonobstructed proximal sigmoid colon. 5.  Other findings as above. The study was marked in [facility] for immediate notification. Workstation performed: [id number]       EKG, Pathology, and Other Studies Reviewed on Admission:   · EKG: Reviewed    Counseling / Coordination of Care Time: 30 total mins spent n consult. Greater than 50% of total time spent on patient counseling and coordination of care. Susie Azul MD   PGY-4, Department of Gastroenterology. ...............................................................................................................................................  ** Please Note: This note is constructed using a voice recognition dictation system.  **

## 2023-07-09 NOTE — ASSESSMENT & PLAN NOTE
Wt Readings from Last 3 Encounters:   07/09/23 63.9 kg (140 lb 14 oz)   06/28/23 74.5 kg (164 lb 3.9 oz)   05/11/23 66.7 kg (147 lb)     · Diuretics held on admission in the setting of shock and KARINA  · Torsemide 40 mg daily restarted per cardiology  · I/O, daily weight, low-sodium diet with fluid restriction

## 2023-07-09 NOTE — ASSESSMENT & PLAN NOTE
Lab Results   Component Value Date    HGBA1C 8.3 (H) 02/23/2023       Recent Labs     07/08/23  1543 07/08/23  2044 07/09/23  0540 07/09/23  0705   POCGLU 177* 273* 110 142*       Blood Sugar Average: Last 72 hrs:  (P) [id number]   · Continue Lantus 5 units at bedtime with sliding scale

## 2023-07-10 ENCOUNTER — APPOINTMENT (INPATIENT)
Dept: GASTROENTEROLOGY | Facility: HOSPITAL | Age: 83
DRG: 314 | End: 2023-07-10
Payer: MEDICARE

## 2023-07-10 ENCOUNTER — ANESTHESIA EVENT (INPATIENT)
Dept: GASTROENTEROLOGY | Facility: HOSPITAL | Age: 83
DRG: 314 | End: 2023-07-10
Payer: MEDICARE

## 2023-07-10 ENCOUNTER — ANESTHESIA (INPATIENT)
Dept: GASTROENTEROLOGY | Facility: HOSPITAL | Age: 83
DRG: 314 | End: 2023-07-10
Payer: MEDICARE

## 2023-07-10 DIAGNOSIS — I48.0 PAROXYSMAL ATRIAL FIBRILLATION (HCC): ICD-10-CM

## 2023-07-10 LAB
ANION GAP SERPL CALCULATED.3IONS-SCNC: 5 MMOL/L
BUN SERPL-MCNC: 76 MG/DL (ref 5–25)
CALCIUM SERPL-MCNC: 8.1 MG/DL (ref 8.3–10.1)
CHLORIDE SERPL-SCNC: 99 MMOL/L (ref 96–108)
CO2 SERPL-SCNC: 31 MMOL/L (ref 21–32)
CREAT SERPL-MCNC: 2.24 MG/DL (ref 0.6–1.3)
ERYTHROCYTE [DISTWIDTH] IN BLOOD BY AUTOMATED COUNT: 15.9 % (ref 11.6–15.1)
FUNGUS SPEC CULT: NORMAL
GFR SERPL CREATININE-BSD FRML MDRD: 25 ML/MIN/1.73SQ M
GLUCOSE SERPL-MCNC: 116 MG/DL (ref 65–140)
GLUCOSE SERPL-MCNC: 126 MG/DL (ref 65–140)
GLUCOSE SERPL-MCNC: 129 MG/DL (ref 65–140)
GLUCOSE SERPL-MCNC: 161 MG/DL (ref 65–140)
GLUCOSE SERPL-MCNC: 282 MG/DL (ref 65–140)
HCT VFR BLD AUTO: 23.4 % (ref 36.5–49.3)
HGB BLD-MCNC: 7.5 G/DL (ref 12–17)
INR PPP: 1.17 (ref 0.84–1.19)
MCH RBC QN AUTO: 33.5 PG (ref 26.8–34.3)
MCHC RBC AUTO-ENTMCNC: 32.1 G/DL (ref 31.4–37.4)
MCV RBC AUTO: 105 FL (ref 82–98)
PLATELET # BLD AUTO: 134 THOUSANDS/UL (ref 149–390)
PMV BLD AUTO: 9.6 FL (ref 8.9–12.7)
POTASSIUM SERPL-SCNC: 4.1 MMOL/L (ref 3.5–5.3)
PROTHROMBIN TIME: 15.1 SECONDS (ref 11.6–14.5)
RBC # BLD AUTO: 2.24 MILLION/UL (ref 3.88–5.62)
SODIUM SERPL-SCNC: 135 MMOL/L (ref 135–147)
WBC # BLD AUTO: 4.09 THOUSAND/UL (ref 4.31–10.16)

## 2023-07-10 PROCEDURE — 97116 GAIT TRAINING THERAPY: CPT

## 2023-07-10 PROCEDURE — C9113 INJ PANTOPRAZOLE SODIUM, VIA: HCPCS | Performed by: INTERNAL MEDICINE

## 2023-07-10 PROCEDURE — 85027 COMPLETE CBC AUTOMATED: CPT | Performed by: INTERNAL MEDICINE

## 2023-07-10 PROCEDURE — 80048 BASIC METABOLIC PNL TOTAL CA: CPT | Performed by: INTERNAL MEDICINE

## 2023-07-10 PROCEDURE — 0DB78ZX EXCISION OF STOMACH, PYLORUS, VIA NATURAL OR ARTIFICIAL OPENING ENDOSCOPIC, DIAGNOSTIC: ICD-10-PCS | Performed by: INTERNAL MEDICINE

## 2023-07-10 PROCEDURE — 85610 PROTHROMBIN TIME: CPT | Performed by: INTERNAL MEDICINE

## 2023-07-10 PROCEDURE — 99232 SBSQ HOSP IP/OBS MODERATE 35: CPT | Performed by: INTERNAL MEDICINE

## 2023-07-10 PROCEDURE — 97530 THERAPEUTIC ACTIVITIES: CPT

## 2023-07-10 PROCEDURE — 0DB58ZX EXCISION OF ESOPHAGUS, VIA NATURAL OR ARTIFICIAL OPENING ENDOSCOPIC, DIAGNOSTIC: ICD-10-PCS | Performed by: INTERNAL MEDICINE

## 2023-07-10 PROCEDURE — 0DB68ZX EXCISION OF STOMACH, VIA NATURAL OR ARTIFICIAL OPENING ENDOSCOPIC, DIAGNOSTIC: ICD-10-PCS | Performed by: INTERNAL MEDICINE

## 2023-07-10 PROCEDURE — 88305 TISSUE EXAM BY PATHOLOGIST: CPT | Performed by: PATHOLOGY

## 2023-07-10 PROCEDURE — 82948 REAGENT STRIP/BLOOD GLUCOSE: CPT

## 2023-07-10 RX ORDER — ALBUTEROL SULFATE 2.5 MG/3ML
2.5 SOLUTION RESPIRATORY (INHALATION) ONCE
Status: COMPLETED | OUTPATIENT
Start: 2023-07-10 | End: 2023-07-10

## 2023-07-10 RX ORDER — APIXABAN 2.5 MG/1
TABLET, FILM COATED ORAL
Qty: 60 TABLET | Refills: 11 | Status: SHIPPED | OUTPATIENT
Start: 2023-07-10

## 2023-07-10 RX ORDER — PROPOFOL 10 MG/ML
INJECTION, EMULSION INTRAVENOUS AS NEEDED
Status: DISCONTINUED | OUTPATIENT
Start: 2023-07-10 | End: 2023-07-10

## 2023-07-10 RX ORDER — SODIUM CHLORIDE 9 MG/ML
INJECTION, SOLUTION INTRAVENOUS CONTINUOUS PRN
Status: DISCONTINUED | OUTPATIENT
Start: 2023-07-10 | End: 2023-07-10

## 2023-07-10 RX ORDER — PHENYLEPHRINE HCL IN 0.9% NACL 1 MG/10 ML
SYRINGE (ML) INTRAVENOUS AS NEEDED
Status: DISCONTINUED | OUTPATIENT
Start: 2023-07-10 | End: 2023-07-10

## 2023-07-10 RX ORDER — PROPOFOL 10 MG/ML
INJECTION, EMULSION INTRAVENOUS CONTINUOUS PRN
Status: DISCONTINUED | OUTPATIENT
Start: 2023-07-10 | End: 2023-07-10

## 2023-07-10 RX ADMIN — ATORVASTATIN CALCIUM 40 MG: 40 TABLET, FILM COATED ORAL at 16:35

## 2023-07-10 RX ADMIN — Medication 200 MCG: at 10:04

## 2023-07-10 RX ADMIN — POLYETHYLENE GLYCOL 3350 17 G: 17 POWDER, FOR SOLUTION ORAL at 11:32

## 2023-07-10 RX ADMIN — Medication 200 MCG: at 10:11

## 2023-07-10 RX ADMIN — DOCUSATE SODIUM 100 MG: 100 CAPSULE, LIQUID FILLED ORAL at 17:13

## 2023-07-10 RX ADMIN — SENNOSIDES 17.2 MG: 8.6 TABLET, FILM COATED ORAL at 22:02

## 2023-07-10 RX ADMIN — Medication 200 MCG: at 10:25

## 2023-07-10 RX ADMIN — DOCUSATE SODIUM 100 MG: 100 CAPSULE, LIQUID FILLED ORAL at 11:31

## 2023-07-10 RX ADMIN — MELATONIN TAB 3 MG 6 MG: 3 TAB at 22:02

## 2023-07-10 RX ADMIN — SODIUM CHLORIDE: 0.9 INJECTION, SOLUTION INTRAVENOUS at 09:51

## 2023-07-10 RX ADMIN — LATANOPROST 1 DROP: 50 SOLUTION OPHTHALMIC at 22:03

## 2023-07-10 RX ADMIN — PROPOFOL 50 MG: 10 INJECTION, EMULSION INTRAVENOUS at 09:58

## 2023-07-10 RX ADMIN — TIMOLOL MALEATE 1 DROP: 5 SOLUTION/ DROPS OPHTHALMIC at 11:32

## 2023-07-10 RX ADMIN — APIXABAN 2.5 MG: 2.5 TABLET, FILM COATED ORAL at 17:16

## 2023-07-10 RX ADMIN — INSULIN LISPRO 3 UNITS: 100 INJECTION, SOLUTION INTRAVENOUS; SUBCUTANEOUS at 22:03

## 2023-07-10 RX ADMIN — ACETAMINOPHEN 650 MG: 325 TABLET ORAL at 16:44

## 2023-07-10 RX ADMIN — Medication 40 MG: at 10:00

## 2023-07-10 RX ADMIN — PANTOPRAZOLE SODIUM 40 MG: 40 INJECTION, POWDER, FOR SOLUTION INTRAVENOUS at 11:31

## 2023-07-10 RX ADMIN — Medication 2000 UNITS: at 11:31

## 2023-07-10 RX ADMIN — OXYCODONE HYDROCHLORIDE AND ACETAMINOPHEN 500 MG: 500 TABLET ORAL at 11:31

## 2023-07-10 RX ADMIN — PANTOPRAZOLE SODIUM 40 MG: 40 INJECTION, POWDER, FOR SOLUTION INTRAVENOUS at 22:01

## 2023-07-10 RX ADMIN — INSULIN GLARGINE 5 UNITS: 100 INJECTION, SOLUTION SUBCUTANEOUS at 22:02

## 2023-07-10 RX ADMIN — PROPOFOL 100 MCG/KG/MIN: 10 INJECTION, EMULSION INTRAVENOUS at 10:00

## 2023-07-10 RX ADMIN — TORSEMIDE 40 MG: 20 TABLET ORAL at 11:31

## 2023-07-10 RX ADMIN — ALBUTEROL SULFATE 2.5 MG: 2.5 SOLUTION RESPIRATORY (INHALATION) at 10:48

## 2023-07-10 RX ADMIN — INSULIN LISPRO 5 UNITS: 100 INJECTION, SOLUTION INTRAVENOUS; SUBCUTANEOUS at 11:34

## 2023-07-10 RX ADMIN — TAMSULOSIN HYDROCHLORIDE 0.4 MG: 0.4 CAPSULE ORAL at 16:35

## 2023-07-10 RX ADMIN — INSULIN LISPRO 1 UNITS: 100 INJECTION, SOLUTION INTRAVENOUS; SUBCUTANEOUS at 11:33

## 2023-07-10 NOTE — OCCUPATIONAL THERAPY NOTE
OT CANCEL NOTE:    Unable to see pt for OT treatment this AM due to pt being off floor for procedure/testing. Will continue to follow and treat as able.

## 2023-07-10 NOTE — PROGRESS NOTES
INTERNAL MEDICINE RESIDENCY PROGRESS NOTE     Name: Alok Ortez   Age & Sex: 78 y.o. male   MRN: [id number]  Unit/Bed#: [facility] 402-01   Encounter: [id number]  Team: SLIM    PATIENT INFORMATION     Name: Alok Ortez   Age & Sex: 78 y.o. male   MRN: [id number]  Hospital Stay Days: 12    ASSESSMENT/PLAN     Principal Problem:    Pericardial effusion  Active Problems:    Chronic combined systolic and diastolic congestive heart failure (HCC)    Pleural effusion, right side    Encephalopathy    Urinary retention    Anemia    Acute kidney injury superimposed on chronic kidney disease (HCC)    Atrial fibrillation with slow ventricular response (HCC)    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 (HCC)      * Pericardial effusion  Assessment & Plan  • Large acute on chronic pericardial effusion concerning for possible early hemodynamic changes for tamponade  ? S/p pericardiocentesis 6/30  • Cultures are negative  • Repeat echo 7/3/2023 showed small loculated pericardial effusion at the apex, small effusion along the RA free wall  • PAC removed  • Repeat echo 7/6/23 showed concentric remodeling, biatrial dilation but no pericardial effusion. The pericardium was markedly thickened and marked ventricular septal motion abnormality.   • Pericardial drain removed on 07/05 per cards, will likely not need pericardial window d/t no recurrence of effusion on echo  • Will continue current course, per cardiology    Chronic combined systolic and diastolic congestive heart failure ([address])  Assessment & Plan  Wt Readings from Last 3 Encounters:   07/10/23 64.9 kg (143 lb 1.3 oz)   06/28/23 74.5 kg (164 lb 3.9 oz)   05/11/23 66.7 kg (147 lb)     • Diuretics held on admission in the setting of shock and KARINA  • Torsemide 40 mg daily restarted per cardiology  • 2.8L diuresis and net negative, exam still with 2+ pitting edema on 07/06  • Pitting edema significantly improved on 07/07  • Appears to be back to baseline on exam 07/10  • I/O, daily weight, low-sodium diet with fluid restriction        Pleural effusion, right side  Assessment & Plan  • Pneumonia C/F with right lower lobe empyema status post right chest tube  • Chest tube removed by IR on 07/07  • Patient has completed 7-day cefepime course    Encephalopathy  Assessment & Plan  • Likely toxic/metabolic secondary to critical illness and delirium  • Continue delirium precautions  • Patient with difficulty sleeping  • Melatonin and mirtazapine were added and critical care  • Improved mentation on 07/06 compared to previous, now AOx3  • Mentation seems to be closer to baseline per son    Urinary retention  Assessment & Plan  • Dupont placed for urinary retention  • Dupont catheter now removed  • Voiding well  • Will monitor I/Os  • Does have history of BPH, continue Flomax    Anemia  Assessment & Plan  • Hemoglobin 11.0 present on admission  • Platelet count with improvement at 126  • Hgb decreased from 8.7-7.9 x 2 -> 7.2 -> 7.1 -> 7.5  • Blood consent obtained  • Obtained FOBT due to reported concern black stools by nurse on 07/07  • FOBT returned positive, GI on boarded for EGD on 07/10  • EGD on 07/10 found duodenal ulcer with clean base, likely the source of pt's bleed  • Protonix 40 mg BID  • Continue trending CBC    Acute kidney injury superimposed on chronic kidney disease [facility])  Assessment & Plan  Lab Results   Component Value Date    EGFR 25 07/10/2023    EGFR 26 07/09/2023    EGFR 27 07/08/2023    CREATININE 2.24 (H) 07/10/2023    CREATININE 2.22 (H) 07/09/2023    CREATININE 2.13 (H) 07/08/2023     • Resolving, likely secondary cardiorenal  • Baseline creatinine 2.5-3, now at 2.24  • Patient is status post pericardiocentesis and diuresis  • Critical care held diuresis 6/20/2023 when fluid shifts secondary to large pericardial drainage  • Currently on torsemide 40 mg daily  • Now resolved    Atrial fibrillation with slow ventricular response [facility])  Assessment & Plan  • Will continue to hold Eliquis d/t recent downtrending Hgb  • FOBT positive, with EGD findings of duodenal ulcer with clean base, likely cause of anemia  • Restarted Eliquis 2.5 mg BID home dose, per GI    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 [facility])  Assessment & Plan  Lab Results   Component Value Date    HGBA1C 8.3 (H) 2023       Recent Labs     23  1603 23  2045 07/10/23  0540 07/10/23  1128   POCGLU 285* 206* 129 161*       Blood Sugar Average: Last 72 hrs:  (P) 181.5625     • Added 10 units Lantus at at bedtime on 7/3/2023, hypoglycemic this morning, decrease to Lantus 5 units  • Added 5 units lispro 3 times daily with meals on 7/3/2023  • Continue sliding scale insulin algorithm 3      Disposition: medically stable for discharge to acute rehab    SUBJECTIVE     Patient seen and examined. No acute events overnight. Now s/p EGD, stating he is doing okay. He denies having any complaints and asking when he can go home. He denies any CP, SOB, and LE swelling or pain. OBJECTIVE     Vitals:    07/10/23 1037 07/10/23 1043 07/10/23 1131 07/10/23 1458   BP: 119/59 114/58 117/92 143/69   Pulse: 79 86 94 79   Resp: 18 18  19   Temp:    97.6 °F (36.4 °C)   TempSrc:       SpO2: 100% 98% 97% 99%   Weight:       Height:          Temperature:   Temp (24hrs), Av.2 °F (36.2 °C), Min:96.4 °F (35.8 °C), Max:98.2 °F (36.8 °C)    Temperature: 97.6 °F (36.4 °C)  Intake & Output:  I/O       / 0701   0700  0701  07/10 0700 07/10 07 0700    P. O. 1800 600     I.V. (mL/kg)   100 (1.5)    Total Intake(mL/kg) 1800 (28.2) 600 (9.2) 100 (1.5)    Urine (mL/kg/hr) 1119 (0.7) 420 (0.3)     Stool 0      Total Output 1119 420     Net +681 +180 +100           Unmeasured Urine Occurrence  2 x     Unmeasured Stool Occurrence 1 x 2 x         Weights:   IBW (Ideal Body Weight): 70.7 kg    Body mass index is 21.13 kg/m².   Weight (last 2 days)     Date/Time Weight    07/10/23 0600 64.9 (143.08)    07/09/23 0600 63.9 (140.87)    07/08/23 0545 63.4 (139.77)        Physical Exam  Vitals reviewed. Constitutional:       General: He is not in acute distress. Appearance: Normal appearance. He is not diaphoretic. HENT:      Head: Normocephalic and atraumatic. Nose: Nose normal.      Mouth/Throat:      Mouth: Mucous membranes are dry. Eyes:      Conjunctiva/sclera: Conjunctivae normal.   Cardiovascular:      Rate and Rhythm: Normal rate and regular rhythm. Pulses: Normal pulses. Heart sounds: Normal heart sounds. No murmur heard. No friction rub. No gallop. Pulmonary:      Effort: Pulmonary effort is normal. No respiratory distress. Breath sounds: Normal breath sounds. Abdominal:      General: Abdomen is flat. Bowel sounds are normal. There is no distension. Palpations: Abdomen is soft. Musculoskeletal:      Right lower leg: Edema present. Left lower leg: Edema present. Comments: Trace pitting edema across LE extremities to mid-shin   Skin:     General: Skin is warm and dry. Capillary Refill: Capillary refill takes less than 2 seconds. Coloration: Skin is not pale. Neurological:      General: No focal deficit present. Mental Status: He is alert. Sensory: No sensory deficit. Comments: AOx2 to person, place   Psychiatric:         Mood and Affect: Mood normal.         Behavior: Behavior normal.         Thought Content: Thought content normal.         LABORATORY DATA     Labs: I have personally reviewed pertinent reports.   Results from last 7 days   Lab Units 07/10/23  0442 07/09/23  0508 07/08/23  0413 07/07/23  0445 07/06/23  0419 07/05/23  0428   WBC Thousand/uL 4.09* 4.50 5.50 5.35 6.59 7.39   HEMOGLOBIN g/dL 7.5* 7.6* 7.7* 7.1* 7.2* 7.4*   HEMATOCRIT % 23.4* 23.0* 22.7* 22.7* 23.1* 22.5*   PLATELETS Thousands/uL 134* 139* 144* 137* 139* 122*   NEUTROS PCT %  --   --   --  69 72 78*   MONOS PCT %  --   --   --  10 9 8   EOS PCT %  --   --   --  1 1 1      Results from last 7 days   Lab Units 07/10/23  0442 07/09/23  0508 07/08/23  0413   POTASSIUM mmol/L 4.1 4.0 4.1   CHLORIDE mmol/L 99 100 101   CO2 mmol/L 31 30 32   BUN mg/dL 76* 80* 84*   CREATININE mg/dL 2.24* 2.22* 2.13*   CALCIUM mg/dL 8.1* 8.3 8.2*              Results from last 7 days   Lab Units 07/10/23  0758   INR  1.17               IMAGING & DIAGNOSTIC TESTING     Radiology Results: I have personally reviewed pertinent reports. XR chest portable ICU    Result Date: 7/2/2023  Impression: Pericardial drain with slight decrease in marked enlargement of cardiac silhouette from pericardial effusion. Persistent small loculated right basilar hydropneumothorax and small left effusion. Persistent right base atelectasis. Workstation performed: [id number]     XR chest portable ICU    Result Date: 7/1/2023  Impression: Persistent small loculated right hydropneumothorax with moderate left effusion and bibasilar atelectasis. Pulmonary artery catheter in right interlobar pulmonary artery, subsequently retracted. Workstation performed: [id number]     XR chest portable ICU    Result Date: 7/1/2023  Impression: Pulmonary artery catheter in main pulmonary artery. Persistent enlargement of the cardiac silhouette with pericardial drain. Right pleural pigtail catheter with stable small loculated right hydropneumothorax and mild bibasilar atelectasis. Workstation performed: [id number]     Other Diagnostic Testing: I have personally reviewed pertinent reports.     ACTIVE MEDICATIONS     Current Facility-Administered Medications   Medication Dose Route Frequency   • acetaminophen (TYLENOL) tablet 650 mg  650 mg Oral Q4H PRN   • apixaban (ELIQUIS) tablet 2.5 mg  2.5 mg Oral BID   • ascorbic acid (VITAMIN C) tablet 500 mg  500 mg Oral Daily   • atorvastatin (LIPITOR) tablet 40 mg  40 mg Oral Daily With Dinner   • cholecalciferol (VITAMIN D3) tablet 2,000 Units  2,000 Units Oral Daily   • docusate sodium (COLACE) capsule 100 mg  100 mg Oral BID   • HYDROmorphone HCl (DILAUDID) injection 0.2 mg  0.2 mg Intravenous Q4H PRN   • insulin glargine (LANTUS) subcutaneous injection 5 Units 0.05 mL  5 Units Subcutaneous HS   • insulin lispro (HumaLOG) 100 units/mL subcutaneous injection 1-5 Units  1-5 Units Subcutaneous TID AC   • insulin lispro (HumaLOG) 100 units/mL subcutaneous injection 1-5 Units  1-5 Units Subcutaneous HS   • insulin lispro (HumaLOG) 100 units/mL subcutaneous injection 5 Units  5 Units Subcutaneous TID With Meals   • latanoprost (XALATAN) 0.005 % ophthalmic solution 1 drop  1 drop Both Eyes HS   • melatonin tablet 6 mg  6 mg Oral HS   • ondansetron (ZOFRAN) injection 4 mg  4 mg Intravenous Once   • ondansetron (ZOFRAN) injection 4 mg  4 mg Intravenous Q4H PRN   • oxyCODONE (ROXICODONE) IR tablet 5 mg  5 mg Oral Q4H PRN   • oxyCODONE (ROXICODONE) split tablet 2.5 mg  2.5 mg Oral Q4H PRN   • pantoprazole (PROTONIX) injection 40 mg  40 mg Intravenous Q12H RASHAD   • polyethylene glycol (MIRALAX) packet 17 g  17 g Oral Daily   • senna (SENOKOT) tablet 17.2 mg  2 tablet Oral HS   • tamsulosin (FLOMAX) capsule 0.4 mg  0.4 mg Oral Daily With Dinner   • timolol (TIMOPTIC) 0.5 % ophthalmic solution 1 drop  1 drop Both Eyes Daily   • torsemide (DEMADEX) tablet 40 mg  40 mg Oral Daily       VTE Pharmacologic Prophylaxis: Heparin  VTE Mechanical Prophylaxis: sequential compression device    Portions of the record may have been created with voice recognition software. Occasional wrong word or "sound a like" substitutions may have occurred due to the inherent limitations of voice recognition software.   Read the chart carefully and recognize, using context, where substitutions have occurred.  ==  Taylor Madrigal MD  [facility]  Internal Medicine Residency PGY-1

## 2023-07-10 NOTE — ANESTHESIA PREPROCEDURE EVALUATION
Procedure:  EGD    Relevant Problems   CARDIO   (+) Atrial fibrillation with slow ventricular response (HCC)   (+) Chronic combined systolic and diastolic congestive heart failure (HCC)   (+) Essential hypertension   (+) PVC (premature ventricular contraction)      ENDO   (+) 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 (HCC)      /RENAL   (+) Acute kidney injury superimposed on chronic kidney disease (HCC)   (+) Benign hypertension with CKD (chronic kidney disease) stage IV (HCC)   (+) Chronic kidney disease-mineral and bone disorder      HEMATOLOGY   (+) Anemia   (+) Thrombocytopenia (HCC)      PULMONARY   (+) Pleural effusion, right side        Physical Exam    Airway    Mallampati score: II  TM Distance: >3 FB  Neck ROM: full     Dental       Cardiovascular      Pulmonary      Other Findings        Anesthesia Plan  ASA Score- 3     Anesthesia Type- IV sedation with anesthesia with ASA Monitors. Additional Monitors:   Airway Plan:           Plan Factors-Exercise tolerance (METS): >4 METS. Chart reviewed. Patient is not a current smoker. Obstructive sleep apnea risk education given perioperatively. Induction- intravenous. Postoperative Plan-     Informed Consent- Anesthetic plan and risks discussed with patient and son. I personally reviewed this patient with the CRNA. Discussed and agreed on the Anesthesia Plan with the CRNA. .        NPO appropriate. Discussed benefits/risks of monitored anesthetic care and discussed providing a dynamic level of mild to deep sedation. Risks include awareness, airway obstruction, aspiration which may necessitate conversion to general anesthesia. All questions answered. Patient understands and wishes to proceed. Anesthesia plan and consent discussed with Gretta Ferguson (son) who expressed understanding and agreement.  Risks/benefits and alternatives discussed with patient including possible PONV, sore throat, damage to teeth/lips/gums and possibility of rare anesthetic and surgical emergencies.

## 2023-07-10 NOTE — PROGRESS NOTES
Progress Note - Cardiology   Singh Mcdonough 78 y.o. male MRN: [id number]  Unit/Bed#: [facility] 402-01 Encounter: [id number]  07/10/23  8:58 AM    Impression and Plan:    57-year-old with a history of atrial fibrillation-on Eliquis renal dose, type 2 diabetes, CKD, hypertension, chronic pericardial effusion-noted initially in 2019 presented with diarrhea and pneumonia, chest tube placed for effusion. Found to have increased size of the pericardial fusion with intermediate probability of tamponade and transferred to [facility] with worsening hemodynamics on echocardiography, and ultimately underwent pericardiocentesis-175 cc-on 6/30/2023 and drained ultimately removed on 7/5/2023, with no residual pericardial effusion noted post removal on 7/6/2023 but with thickened pericardium. LV size and function are normal.    Just returned from EGD, denies any complaints at this time    Plan:    Large pericardial effusion with evidence of hemodynamic compromise on echo: Ultimately underwent pericardiocentesis with insertion of drain, removed after 5 days. Post removal echo without recurrence but with thickened pericardium, will need outpatient follow-up for recurrence as well as to assess hemodynamics for constriction  Easily auscultable heart sounds    Atrial fibrillation: Not on AV donnie blockers at baseline or currently, due to slow rates, due to significant anemia, not on anticoagulation currently but at baseline was on Eliquis 2.5 mg twice daily  Baseline hemoglobin around 11 and currently around 7-8-plan is-status post EGD today clean-based duodenal ulcer, Beck's esophagus, biopsies taken, plan for PPI,  bleeding suspected from duodenal ulcer  ?   Resume Eliquis-defer to GI    Chronic combined systolic and diastolic congestive heart failure: Initially diuretics were held in the setting of shock, subsequently underwent IV diuresis this admission for volume overload from fluid resuscitation, at baseline is on torsemide 40 mg daily and restarted, has minimal distal lower extremity edema    CKD: Baseline creatinine 2.5-3, creatinine back at baseline and stable    Diabetes and dyslipidemia: Last A1c of 8.3, no recent lipid profile, already on high intensity statin. Pneumonia and pleural effusion: Right chest tube and removal, status post antibiotics,            ===================================================================    Chief Complaint: No chief complaint on file. Subjective/Objective     Subjective: Denies any complaints     Objective: No distress    Patient Active Problem List   Diagnosis   • Closed traumatic displaced fracture of distal end of left radius with malunion   • PVC (premature ventricular contraction)   • Essential hypertension   • Leg edema   • 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 ([facility])   • Pericardial effusion   • Chronic combined systolic and diastolic congestive heart failure (HCC)   • Vitamin D deficiency   • Cellulitis of left upper extremity   • Atrial fibrillation with slow ventricular response ([facility])   • COVID-19   • Electrolyte abnormality   • Acute kidney injury superimposed on chronic kidney disease (HCC)   • Benign hypertension with CKD (chronic kidney disease) stage IV ([facility])   • Persistent proteinuria   • Anemia   • Chronic kidney disease-mineral and bone disorder   • Advanced care planning/counseling discussion   • Hyponatremia   • Macrocytosis   • Urinary retention   • Septic shock ([facility])   • Thrombocytopenia ([facility])   • Diarrhea   • Hypothermia   • Pleural effusion, right side   • Hypoglycemia   • Empyema of lung ([facility])   • Encephalopathy       Vitals: /94   Pulse 88   Temp (!) 97.2 °F (36.2 °C)   Resp 16   Ht 5' 9" (1.753 m)   Wt 64.9 kg (143 lb 1.3 oz)   SpO2 98%   BMI 21.13 kg/m²     No intake/output data recorded.   Wt Readings from Last 3 Encounters:   07/10/23 64.9 kg (143 lb 1.3 oz)   06/28/23 74.5 kg (164 lb 3.9 oz) 05/11/23 66.7 kg (147 lb)       Intake/Output Summary (Last 24 hours) at 7/10/2023 0858  Last data filed at 7/9/2023 1246  Gross per 24 hour   Intake 240 ml   Output 420 ml   Net -180 ml     I/O last 3 completed shifts: In: 600 [P.O.:600]  Out: 420 [Urine:420]    Invasive Devices     Peripheral Intravenous Line  Duration           Long-Dwell Peripheral IV (Midline) 63/62/32 Right Basilic 15 days          Drain  Duration           External Urinary Catheter Small <1 day                  Physical Exam:  GEN: Zabrina Ochoa appears okay, alert and oriented x 3, pleasant and cooperative   HEENT: pupils equal, round, and reactive to light; extraocular muscles intact  NECK: supple, no carotid bruits or JVD  HEART: irregular rhythm, normal S1 and S2, no murmur, no clicks, gallops or rubs   LUNGS: clear to auscultation bilaterally; no wheezes or rhonchi, no  rales  ABDOMEN/GI: normal bowel sounds, soft, no tenderness, no distention  EXTREMITIES/Musculoskeltal: peripheral pulses normal; no clubbing, cyanosis, minimal distal lower extremity edema  NEURO: no focal motor findings   SKIN: normal without suspicious lesions on exposed skin              Lab Results:       Results from last 7 days   Lab Units 07/10/23  0442 07/09/23  0508 07/08/23  0413   WBC Thousand/uL 4.09* 4.50 5.50   HEMOGLOBIN g/dL 7.5* 7.6* 7.7*   HEMATOCRIT % 23.4* 23.0* 22.7*   PLATELETS Thousands/uL 134* 139* 144*         Results from last 7 days   Lab Units 07/10/23  0442 07/09/23  0508 07/08/23  0413   POTASSIUM mmol/L 4.1 4.0 4.1   CHLORIDE mmol/L 99 100 101   CO2 mmol/L 31 30 32   BUN mg/dL 76* 80* 84*   CREATININE mg/dL 2.24* 2.22* 2.13*   CALCIUM mg/dL 8.1* 8.3 8.2*     Results from last 7 days   Lab Units 07/10/23  0758   INR  1.17       Imaging: I have personally reviewed pertinent reports.     EKG/Telemtry: No events    Scheduled Meds:  Current Facility-Administered Medications   Medication Dose Route Frequency Provider Last Rate   • acetaminophen  650 mg Oral Q4H PRN Andrade CORRIE Reyes     • ascorbic acid  500 mg Oral Daily Maicol TIFFANY AguiarC     • atorvastatin  40 mg Oral Daily With Ancelmo Owens PA-C     • cholecalciferol  2,000 Units Oral Daily Maicol CORRIE Aguiar     • docusate sodium  100 mg Oral BID Maicol CORRIE Aguiar     • HYDROmorphone  0.2 mg Intravenous Q4H PRN Andrade CORRIE Reyes     • insulin glargine  5 Units Subcutaneous HS Haim Elizondo MD     • insulin lispro  1-5 Units Subcutaneous TID AC SUSANA Sanders     • insulin lispro  1-5 Units Subcutaneous HS SUSANA Sanders     • insulin lispro  5 Units Subcutaneous TID With Meals Haim Elizondo MD     • latanoprost  1 drop Both Eyes HS Maicol Aguiar PA-C     • melatonin  6 mg Oral HS Edmar Price PA-C     • ondansetron  4 mg Intravenous Once SUSANA Sanders     • ondansetron  4 mg Intravenous Q4H PRN Andrade CORRIE Reyes     • oxyCODONE  5 mg Oral Q4H PRN Andrade CORRIE Reyes     • oxyCODONE  2.5 mg Oral Q4H PRN Andrade CORRIE Reyes     • pantoprazole  40 mg Intravenous Q12H [facility] Rafat Godinez MD     • polyethylene glycol  17 g Oral Daily Maicol TIFFANY AguiarC     • senna  2 tablet Oral HS Maicol CORRIE Aguiar     • tamsulosin  0.4 mg Oral Daily With Ancelmo Owens PA-C     • timolol  1 drop Both Eyes Daily Maicol TIFFANY AguiarC     • torsemide  40 mg Oral Daily Maicol CosmeTIM lowry-AYDEE       Continuous Infusions:       VTE Pharmacologic Prophylaxis: Reason for no pharmacologic prophylaxis GI bleeding  VTE Mechanical Prophylaxis: sequential compression device    This note was completed in part utilizing [employer] direct voice recognition software.    Grammatical errors, random word insertion, spelling mistakes, occasional wrong word or "sound-alike" substitutions and incomplete sentences may be an occasional consequence of the system secondary to software limitations, ambient noise and hardware issues. At the time of dictation, efforts were made to edit, clarify and /or correct errors. Please read the chart carefully and recognize, using context, where substitutions have occurred. If you have any questions or concerns about the context, text or information contained within the body of this dictation, please contact myself, the provider, for further clarification.

## 2023-07-10 NOTE — CASE MANAGEMENT
Case Management Assessment & Discharge Planning Note    Patient name Sharath Deng  Location [address]/[facility] 787-77 MRN [id number]  : 1940 Date 7/10/2023       Current Admission Date: 2023  Current Admission Diagnosis:Pericardial effusion   Patient Active Problem List    Diagnosis Date Noted   • Encephalopathy 2023   • Empyema of lung ([address]) 2023   • Hypoglycemia 2023   • Thrombocytopenia ([address]) 2023   • Diarrhea 2023   • Hypothermia 2023   • Pleural effusion, right side 2023   • Septic shock ([address]) 2023   • Urinary retention 2023   • Macrocytosis 2023   • Hyponatremia 2023   • Anemia 2023   • Chronic kidney disease-mineral and bone disorder 2023   • Advanced care planning/counseling discussion 2023   • Acute kidney injury superimposed on chronic kidney disease ([address]) 2022   • Benign hypertension with CKD (chronic kidney disease) stage IV ([address]) 2022   • Persistent proteinuria 2022   • COVID-19 2022   • Electrolyte abnormality 2022   • Cellulitis of left upper extremity 2021   • Atrial fibrillation with slow ventricular response ([address]) 2021   • Vitamin D deficiency 10/18/2019   • Chronic combined systolic and diastolic congestive heart failure ([address]) 2019   • Pericardial effusion 2019   • Leg edema 01/10/2019   • 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 ([address]) 01/10/2019   • PVC (premature ventricular contraction) 2018   • Essential hypertension 2018   • Closed traumatic displaced fracture of distal end of left radius with malunion 2018      LOS (days): 12  Geometric Mean LOS (GMLOS) (days): 4.90  Days to GMLOS:-6.7     OBJECTIVE:    Risk of Unplanned Readmission Score: 30.11         Current admission status: Inpatient       Preferred Pharmacy:   [address] [facility] [address] [facility], [address] [facility]  Phone: [phone] Fax: [phone]    Primary Care Provider: Lyn Pierre DO    Primary Insurance: MEDICARE  Secondary Insurance: BLUE CROSS    ASSESSMENT:  Active Health Care Proxies    There are no active Health Care Proxies on file. DISCHARGE DETAILS:                                Requested [address]         Is the patient interested in [address] at discharge?: No         Other Referral/Resources/Interventions Provided:  Interventions: SNF, Short Term Rehab  Referral Comments: Referrals were made to [facility] and [address] at family's request, accepted by both facilities. Presented choices to son and he prefers [facility]. Reservation made. Would you like to participate in our [address] service program?  : No - Declined    Treatment Team Recommendation: Short Term Rehab, SNF  Discharge Destination Plan[de-identified] SNF, Short Term Rehab  Transport at Discharge : BLS Ambulance                                      Additional Comments: Referrals were made and son presented with available choices. He prefers [facility] and reservation was made.

## 2023-07-10 NOTE — PLAN OF CARE
Problem: Prexisting or High Potential for Compromised Skin Integrity  Goal: Skin integrity is maintained or improved  Description: INTERVENTIONS:  - Identify patients at risk for skin breakdown  - Assess and monitor skin integrity  - Assess and monitor nutrition and hydration status  - Monitor labs   - Assess for incontinence   - Turn and reposition patient  - Assist with mobility/ambulation  - Relieve pressure over bony prominences  - Avoid friction and shearing  - Provide appropriate hygiene as needed including keeping skin clean and dry  - Evaluate need for skin moisturizer/barrier cream  - Collaborate with interdisciplinary team   - Patient/family teaching  - Consider wound care consult   Outcome: Progressing     Problem: MOBILITY - ADULT  Goal: Maintain or return to baseline ADL function  Description: INTERVENTIONS:  -  Assess patient's ability to carry out ADLs; assess patient's baseline for ADL function and identify physical deficits which impact ability to perform ADLs (bathing, care of mouth/teeth, toileting, grooming, dressing, etc.)  - Assess/evaluate cause of self-care deficits   - Assess range of motion  - Assess patient's mobility; develop plan if impaired  - Assess patient's need for assistive devices and provide as appropriate  - Encourage maximum independence but intervene and supervise when necessary  - Involve family in performance of ADLs  - Assess for home care needs following discharge   - Consider OT consult to assist with ADL evaluation and planning for discharge  - Provide patient education as appropriate  Outcome: Progressing  Goal: Maintains/Returns to pre admission functional level  Description: INTERVENTIONS:  - Perform BMAT or MOVE assessment daily.   - Set and communicate daily mobility goal to care team and patient/family/caregiver. - Collaborate with rehabilitation services on mobility goals if consulted  - Perform Range of Motion    times a day.   - Reposition patient every hours.  - Dangle patient      times a day  - Stand patient    times a day  - Ambulate patient    times a day  - Out of bed to chair    times a day   - Out of bed for meals      times a day  - Out of bed for toileting  - Record patient progress and toleration of activity level   Outcome: Progressing     Problem: PAIN - ADULT  Goal: Verbalizes/displays adequate comfort level or baseline comfort level  Description: Interventions:  - Encourage patient to monitor pain and request assistance  - Assess pain using appropriate pain scale  - Administer analgesics based on type and severity of pain and evaluate response  - Implement non-pharmacological measures as appropriate and evaluate response  - Consider cultural and social influences on pain and pain management  - Notify physician/advanced practitioner if interventions unsuccessful or patient reports new pain  Outcome: Progressing     Problem: INFECTION - ADULT  Goal: Absence or prevention of progression during hospitalization  Description: INTERVENTIONS:  - Assess and monitor for signs and symptoms of infection  - Monitor lab/diagnostic results  - Monitor all insertion sites, i.e. indwelling lines, tubes, and drains  - Monitor endotracheal if appropriate and nasal secretions for changes in amount and color  - Candor appropriate cooling/warming therapies per order  - Administer medications as ordered  - Instruct and encourage patient and family to use good hand hygiene technique  - Identify and instruct in appropriate isolation precautions for identified infection/condition  Outcome: Progressing  Goal: Absence of fever/infection during neutropenic period  Description: INTERVENTIONS:  - Monitor WBC    Outcome: Progressing     Problem: SAFETY ADULT  Goal: Maintain or return to baseline ADL function  Description: INTERVENTIONS:  -  Assess patient's ability to carry out ADLs; assess patient's baseline for ADL function and identify physical deficits which impact ability to perform ADLs (bathing, care of mouth/teeth, toileting, grooming, dressing, etc.)  - Assess/evaluate cause of self-care deficits   - Assess range of motion  - Assess patient's mobility; develop plan if impaired  - Assess patient's need for assistive devices and provide as appropriate  - Encourage maximum independence but intervene and supervise when necessary  - Involve family in performance of ADLs  - Assess for home care needs following discharge   - Consider OT consult to assist with ADL evaluation and planning for discharge  - Provide patient education as appropriate  Outcome: Progressing  Goal: Maintains/Returns to pre admission functional level  Description: INTERVENTIONS:  - Perform BMAT or MOVE assessment daily.   - Set and communicate daily mobility goal to care team and patient/family/caregiver. - Collaborate with rehabilitation services on mobility goals if consulted  - Perform Range of Motion    times a day. - Reposition patient every    hours.   - Dangle patient    times a day  - Stand patient    times a day  - Ambulate patient    times a day  - Out of bed to chair    times a day   - Out of bed for meals    times a day  - Out of bed for toileting  - Record patient progress and toleration of activity level   Outcome: Progressing  Goal: Patient will remain free of falls  Description: INTERVENTIONS:  - Educate patient/family on patient safety including physical limitations  - Instruct patient to call for assistance with activity   - Consult OT/PT to assist with strengthening/mobility   - Keep Call bell within reach  - Keep bed low and locked with side rails adjusted as appropriate  - Keep care items and personal belongings within reach  - Initiate and maintain comfort rounds  - Make Fall Risk Sign visible to staff  - Offer Toileting every    Hours, in advance of need  - Initiate/Maintain alarm  - Obtain necessary fall risk management equipment:  - Apply yellow socks and bracelet for high fall risk patients  - Consider moving patient to room near nurses station  Outcome: Progressing     Problem: DISCHARGE PLANNING  Goal: Discharge to home or other facility with appropriate resources  Description: INTERVENTIONS:  - Identify barriers to discharge w/patient and caregiver  - Arrange for needed discharge resources and transportation as appropriate  - Identify discharge learning needs (meds, wound care, etc.)  - Arrange for interpretive services to assist at discharge as needed  - Refer to Case Management Department for coordinating discharge planning if the patient needs post-hospital services based on physician/advanced practitioner order or complex needs related to functional status, cognitive ability, or social support system  Outcome: Progressing     Problem: Knowledge Deficit  Goal: Patient/family/caregiver demonstrates understanding of disease process, treatment plan, medications, and discharge instructions  Description: Complete learning assessment and assess knowledge base. Interventions:  - Provide teaching at level of understanding  - Provide teaching via preferred learning methods  Outcome: Progressing     Problem: Nutrition/Hydration-ADULT  Goal: Nutrient/Hydration intake appropriate for improving, restoring or maintaining nutritional needs  Description: Monitor and assess patient's nutrition/hydration status for malnutrition. Collaborate with interdisciplinary team and initiate plan and interventions as ordered. Monitor patient's weight and dietary intake as ordered or per policy. Utilize nutrition screening tool and intervene as necessary. Determine patient's food preferences and provide high-protein, high-caloric foods as appropriate.      INTERVENTIONS:  - Monitor oral intake, urinary output, labs, and treatment plans  - Assess nutrition and hydration status and recommend course of action  - Evaluate amount of meals eaten  - Assist patient with eating if necessary   - Allow adequate time for meals  - Recommend/ encourage appropriate diets, oral nutritional supplements, and vitamin/mineral supplements  - Order, calculate, and assess calorie counts as needed  - Recommend, monitor, and adjust tube feedings and TPN/PPN based on assessed needs  - Assess need for intravenous fluids  - Provide specific nutrition/hydration education as appropriate  - Include patient/family/caregiver in decisions related to nutrition  Outcome: Progressing     Problem: CARDIOVASCULAR - ADULT  Goal: Maintains optimal cardiac output and hemodynamic stability  Description: INTERVENTIONS:  - Monitor I/O, vital signs and rhythm  - Monitor for S/S and trends of decreased cardiac output  - Administer and titrate ordered vasoactive medications to optimize hemodynamic stability  - Assess quality of pulses, skin color and temperature  - Assess for signs of decreased coronary artery perfusion  - Instruct patient to report change in severity of symptoms  Outcome: Progressing  Goal: Absence of cardiac dysrhythmias or at baseline rhythm  Description: INTERVENTIONS:  - Continuous cardiac monitoring, vital signs, obtain 12 lead EKG if ordered  - Administer antiarrhythmic and heart rate control medications as ordered  - Monitor electrolytes and administer replacement therapy as ordered  Outcome: Progressing     Problem: RESPIRATORY - ADULT  Goal: Achieves optimal ventilation and oxygenation  Description: INTERVENTIONS:  - Assess for changes in respiratory status  - Assess for changes in mentation and behavior  - Position to facilitate oxygenation and minimize respiratory effort  - Oxygen administered by appropriate delivery if ordered  - Initiate smoking cessation education as indicated  - Encourage broncho-pulmonary hygiene including cough, deep breathe, Incentive Spirometry  - Assess the need for suctioning and aspirate as needed  - Assess and instruct to report SOB or any respiratory difficulty  - Respiratory Therapy support as indicated  Outcome: Progressing

## 2023-07-10 NOTE — ANESTHESIA POSTPROCEDURE EVALUATION
Post-Op Assessment Note    CV Status:  Stable  Pain Score: 0    Pain management: adequate     Mental Status:  Alert and awake   Hydration Status:  Euvolemic   PONV Controlled:  Controlled   Airway Patency:  Patent      Post Op Vitals Reviewed: Yes      Staff: CRNA         No notable events documented.     BP   83/53   Temp (!) 96.4 °F (35.8 °C) (07/10/23 1022)    Pulse 73 (07/10/23 1022)   Resp 18 (07/10/23 1022)    SpO2   88-97% 4 l cannula

## 2023-07-10 NOTE — PLAN OF CARE
Problem: PHYSICAL THERAPY ADULT  Goal: Performs mobility at highest level of function for planned discharge setting. See evaluation for individualized goals. Description: Treatment/Interventions: LE strengthening/ROM, Therapeutic exercise, ADL retraining, Functional transfer training, Elevations, Endurance training, Cognitive reorientation, Patient/family training, Equipment eval/education, Bed mobility, Gait training, Spoke to nursing, OT (PT spoke to CM)  Equipment Recommended: [place]en Cline       See flowsheet documentation for full assessment, interventions and recommendations. Outcome: Progressing  Note: Prognosis: Fair  Problem List: Decreased strength, Decreased endurance, Impaired balance, Decreased mobility, Decreased coordination, Pain, Decreased cognition, Impaired judgement, Decreased safety awareness  Assessment: Pt seen for session for setup, transfers, gait w/ rest time, repositioning. Pt cooperative w/ session, willing to mobilize w/ encouragement. Notes fatigue, SOB, weakness w/ gait. frustrated about continued medical issues. continue to recommend rehab at d/c  Barriers to Discharge: None     PT Discharge Recommendation: Post acute rehabilitation services    See flowsheet documentation for full assessment.

## 2023-07-10 NOTE — PHYSICAL THERAPY NOTE
Physical Therapy Treatment Note       07/10/23 0850   PT Last Visit   PT Visit Date 07/10/23   Note Type   Note Type Treatment   Pain Assessment   Pain Assessment Tool FLACC   Pain Rating: FLACC (Rest) - Face 1   Pain Rating: FLACC (Rest) - Legs 0   Pain Rating: FLACC (Rest) - Activity 0   Pain Rating: FLACC (Rest) - Cry 0   Pain Rating: FLACC (Rest) - Consolability 0   Score: FLACC (Rest) 1   Pain Rating: FLACC (Activity) - Face 1   Pain Rating: FLACC (Activity) - Legs 1   Pain Rating: FLACC (Activity) - Activity 0   Pain Rating: FLACC (Activity) - Cry 1   Pain Rating: FLACC (Activity) - Consolability 1   Score: FLACC (Activity) 4   Restrictions/Precautions   Weight Bearing Precautions Per Order No   Other Precautions Cognitive; Chair Alarm; Bed Alarm;Multiple lines; Fall Risk;Telemetry   General   Chart Reviewed Yes   Family/Caregiver Present No   Cognition   Overall Cognitive Status Impaired   Arousal/Participation Responsive   Attention Attends with cues to redirect   Orientation Level Oriented to person   Memory Unable to assess   Following Commands Follows one step commands with increased time or repetition   Subjective   Subjective states he is fatigued w/ mobility. "I want to go home"   Transfers   Sit to Stand 3  Moderate assistance   Additional items Assist x 1; Increased time required;Verbal cues; Impulsive   Stand to Sit 4  Minimal assistance   Additional items Assist x 1; Increased time required; Impulsive;Verbal cues   Ambulation/Elevation   Gait pattern   (slow, forward flexion, short step length, ataxia)   Gait Assistance 3  Moderate assist   Additional items Assist x 1   Assistive Device Rolling walker   Distance 50'x1, 15'x1 w/ 5-7 min seated rest.  time spent for setup, repositioning   Balance   Static Sitting Fair   Dynamic Sitting Poor +   Static Standing Poor +   Dynamic Standing Poor   Ambulatory Poor   Endurance Deficit   Endurance Deficit Yes   Endurance Deficit Description fatigue, weakness, pain   Activity Tolerance   Activity Tolerance Patient limited by fatigue;Patient limited by pain;Treatment limited secondary to medical complications (Comment)   Nurse Made Aware yes   Assessment   Prognosis Fair   Problem List Decreased strength;Decreased endurance; Impaired balance;Decreased mobility; Decreased coordination;Pain;Decreased cognition; Impaired judgement;Decreased safety awareness   Assessment Pt seen for session for setup, transfers, gait w/ rest time, repositioning. Pt cooperative w/ session, willing to mobilize w/ encouragement. Notes fatigue, SOB, weakness w/ gait. frustrated about continued medical issues. continue to recommend rehab at d/c   Goals   Patient Goals to go home   STG Expiration Date 07/13/23   PT Treatment Day 1   Plan   Treatment/Interventions Functional transfer training;LE strengthening/ROM; Therapeutic exercise; Endurance training;Patient/family training;Equipment eval/education; Bed mobility;Gait training   Progress Slow progress, medical status limitations   PT Frequency 3-5x/wk   Recommendation   PT Discharge Recommendation Post acute rehabilitation services   Equipment Recommended [facility] Recommended Wheeled walker   Change/add to [employer]?  No   AM-PAC Basic Mobility Inpatient   Turning in Flat Bed Without Bedrails 3   Lying on Back to Sitting on Edge of Flat Bed Without Bedrails 2   Moving Bed to Chair 2   Standing Up From Chair Using Arms 2   Walk in Room 2   Climb 3-5 Stairs With Railing 1   Basic Mobility Inpatient Raw Score 12   Basic Mobility Standardized Score 32.23   Highest Level Of Mobility   JH-HLM Goal 4: Move to chair/commode   JH-HLM Achieved 7: Walk 25 feet or more   Esther Osorio PT, DPT CSRS

## 2023-07-11 VITALS
RESPIRATION RATE: 24 BRPM | HEART RATE: 107 BPM | TEMPERATURE: 96.9 F | HEIGHT: 69 IN | BODY MASS INDEX: 21.19 KG/M2 | WEIGHT: 143.08 LBS | OXYGEN SATURATION: 98 % | SYSTOLIC BLOOD PRESSURE: 111 MMHG | DIASTOLIC BLOOD PRESSURE: 65 MMHG

## 2023-07-11 PROBLEM — K26.9 DUODENAL ULCER: Chronic | Status: ACTIVE | Noted: 2023-07-10

## 2023-07-11 PROBLEM — K26.9 DUODENAL ULCER: Status: ACTIVE | Noted: 2023-07-10

## 2023-07-11 LAB
BASOPHILS # BLD AUTO: 0.01 THOUSANDS/ÂΜL (ref 0–0.1)
BASOPHILS NFR BLD AUTO: 0 % (ref 0–1)
EOSINOPHIL # BLD AUTO: 0.02 THOUSAND/ÂΜL (ref 0–0.61)
EOSINOPHIL NFR BLD AUTO: 0 % (ref 0–6)
ERYTHROCYTE [DISTWIDTH] IN BLOOD BY AUTOMATED COUNT: 16.4 % (ref 11.6–15.1)
GLUCOSE SERPL-MCNC: 230 MG/DL (ref 65–140)
GLUCOSE SERPL-MCNC: 240 MG/DL (ref 65–140)
HCT VFR BLD AUTO: 22.5 % (ref 36.5–49.3)
HGB BLD-MCNC: 7.8 G/DL (ref 12–17)
IMM GRANULOCYTES # BLD AUTO: 0.02 THOUSAND/UL (ref 0–0.2)
IMM GRANULOCYTES NFR BLD AUTO: 0 % (ref 0–2)
LYMPHOCYTES # BLD AUTO: 1.19 THOUSANDS/ÂΜL (ref 0.6–4.47)
LYMPHOCYTES NFR BLD AUTO: 18 % (ref 14–44)
MCH RBC QN AUTO: 35.3 PG (ref 26.8–34.3)
MCHC RBC AUTO-ENTMCNC: 34.7 G/DL (ref 31.4–37.4)
MCV RBC AUTO: 102 FL (ref 82–98)
MONOCYTES # BLD AUTO: 0.84 THOUSAND/ÂΜL (ref 0.17–1.22)
MONOCYTES NFR BLD AUTO: 13 % (ref 4–12)
MYCOBACTERIUM SPEC CULT: NORMAL
NEUTROPHILS # BLD AUTO: 4.39 THOUSANDS/ÂΜL (ref 1.85–7.62)
NEUTS SEG NFR BLD AUTO: 69 % (ref 43–75)
NRBC BLD AUTO-RTO: 0 /100 WBCS
PLATELET # BLD AUTO: 262 THOUSANDS/UL (ref 149–390)
PMV BLD AUTO: 12.4 FL (ref 8.9–12.7)
RBC # BLD AUTO: 2.21 MILLION/UL (ref 3.88–5.62)
RHODAMINE-AURAMINE STN SPEC: NORMAL
WBC # BLD AUTO: 6.47 THOUSAND/UL (ref 4.31–10.16)

## 2023-07-11 PROCEDURE — 88305 TISSUE EXAM BY PATHOLOGIST: CPT | Performed by: PATHOLOGY

## 2023-07-11 PROCEDURE — C9113 INJ PANTOPRAZOLE SODIUM, VIA: HCPCS | Performed by: INTERNAL MEDICINE

## 2023-07-11 PROCEDURE — 99239 HOSP IP/OBS DSCHRG MGMT >30: CPT | Performed by: INTERNAL MEDICINE

## 2023-07-11 PROCEDURE — 99232 SBSQ HOSP IP/OBS MODERATE 35: CPT | Performed by: INTERNAL MEDICINE

## 2023-07-11 PROCEDURE — 85025 COMPLETE CBC W/AUTO DIFF WBC: CPT

## 2023-07-11 PROCEDURE — 82948 REAGENT STRIP/BLOOD GLUCOSE: CPT

## 2023-07-11 RX ORDER — MELATONIN
2000 DAILY
Qty: 60 TABLET | Refills: 0 | Status: SHIPPED | OUTPATIENT
Start: 2023-07-12 | End: 2023-08-11

## 2023-07-11 RX ORDER — INSULIN LISPRO 100 [IU]/ML
5 INJECTION, SOLUTION INTRAVENOUS; SUBCUTANEOUS
Qty: 4.5 ML | Refills: 0 | Status: CANCELLED | OUTPATIENT
Start: 2023-07-11 | End: 2023-08-10

## 2023-07-11 RX ORDER — INSULIN LISPRO 100 [IU]/ML
1-5 INJECTION, SOLUTION INTRAVENOUS; SUBCUTANEOUS
Qty: 1.5 ML | Refills: 0 | Status: CANCELLED | OUTPATIENT
Start: 2023-07-11 | End: 2023-08-10

## 2023-07-11 RX ORDER — PANTOPRAZOLE SODIUM 40 MG/1
40 TABLET, DELAYED RELEASE ORAL DAILY
Qty: 30 TABLET | Refills: 0 | Status: SHIPPED | OUTPATIENT
Start: 2023-07-11 | End: 2023-08-10

## 2023-07-11 RX ORDER — INSULIN GLARGINE 100 [IU]/ML
5 INJECTION, SOLUTION SUBCUTANEOUS
Qty: 1.5 ML | Refills: 0 | Status: CANCELLED | OUTPATIENT
Start: 2023-07-11 | End: 2023-08-10

## 2023-07-11 RX ORDER — INSULIN LISPRO 100 [IU]/ML
1-5 INJECTION, SOLUTION INTRAVENOUS; SUBCUTANEOUS
Qty: 4.5 ML | Refills: 0 | Status: CANCELLED | OUTPATIENT
Start: 2023-07-11 | End: 2023-08-10

## 2023-07-11 RX ADMIN — TIMOLOL MALEATE 1 DROP: 5 SOLUTION/ DROPS OPHTHALMIC at 08:07

## 2023-07-11 RX ADMIN — Medication 2000 UNITS: at 08:01

## 2023-07-11 RX ADMIN — POLYETHYLENE GLYCOL 3350 17 G: 17 POWDER, FOR SOLUTION ORAL at 08:01

## 2023-07-11 RX ADMIN — INSULIN LISPRO 2 UNITS: 100 INJECTION, SOLUTION INTRAVENOUS; SUBCUTANEOUS at 11:23

## 2023-07-11 RX ADMIN — INSULIN LISPRO 2 UNITS: 100 INJECTION, SOLUTION INTRAVENOUS; SUBCUTANEOUS at 07:57

## 2023-07-11 RX ADMIN — TORSEMIDE 40 MG: 20 TABLET ORAL at 08:01

## 2023-07-11 RX ADMIN — APIXABAN 2.5 MG: 2.5 TABLET, FILM COATED ORAL at 08:01

## 2023-07-11 RX ADMIN — DOCUSATE SODIUM 100 MG: 100 CAPSULE, LIQUID FILLED ORAL at 08:01

## 2023-07-11 RX ADMIN — PANTOPRAZOLE SODIUM 40 MG: 40 INJECTION, POWDER, FOR SOLUTION INTRAVENOUS at 08:01

## 2023-07-11 RX ADMIN — OXYCODONE HYDROCHLORIDE AND ACETAMINOPHEN 500 MG: 500 TABLET ORAL at 08:01

## 2023-07-11 NOTE — DISCHARGE INSTR - AVS FIRST PAGE
We started you on TORSEMIDE 40 mg daily, please continue taking this medication unless otherwise changed by your PCP or cardiologist.  We started you on PROTONIX 40 mg daily, please continue taking this medication unless otherwise changed by your PCP or gastroenterologist (stomach doctor). Please continue taking your other home medications as directed by your discharge instructions. Please follow up with your primary care doctor (PCP) within 1 week of discharge. Please follow up with your cardiologist (heart doctor) within 1 month of discharge. Please follow up with your gastroenterologist (stomach doctor) within 1 month of discharge. Current Discharge Medication List        STOP taking these medications       carvedilol (COREG) 6.25 mg tablet Comments:   Reason for Stopping:         isosorbide mononitrate (IMDUR) 30 mg 24 hr tablet Comments:   Reason for Stopping:             Current Discharge Medication List        START taking these medications    Details   pantoprazole (PROTONIX) 40 mg tablet Take 1 tablet (40 mg total) by mouth daily  Qty: 30 tablet, Refills: 0    Associated Diagnoses: Duodenal ulcer           Current Discharge Medication List        CONTINUE these medications which have CHANGED    Details   cholecalciferol (VITAMIN D3) 1,000 units tablet Take 2 tablets (2,000 Units total) by mouth daily Do not start before July 12, 2023. Qty: 60 tablet, Refills: 0    Associated Diagnoses: Encephalopathy      torsemide 40 MG TABS Take 40 mg by mouth daily Do not start before July 12, 2023.   Qty: 30 tablet, Refills: 0    Associated Diagnoses: Chronic kidney disease-mineral and bone disorder; Leg edema; Essential hypertension           Current Discharge Medication List        CONTINUE these medications which have NOT CHANGED    Details   allopurinol (ZYLOPRIM) 100 mg tablet Take 100 mg by mouth 2 (two) times a day      ALPRAZolam (XANAX) 0.5 mg tablet 0.5 mg daily at bedtime   Refills: 0      ascorbic acid (VITAMIN C) 500 MG tablet Take 1 tablet (500 mg total) by mouth daily  Refills: 0    Associated Diagnoses: Cellulitis of left hand      atorvastatin (LIPITOR) 40 mg tablet Take 1 tablet (40 mg total) by mouth daily with dinner  Qty: 30 tablet, Refills: 0    Associated Diagnoses: NSTEMI (non-ST elevated myocardial infarction) ([facility])      Blood Pressure Monitor NILTON by Does not apply route daily  Qty: 1 Device, Refills: 0    Associated Diagnoses: 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 ([facility])      Eliquis 2.5 MG TAKE ONE TABLET BY MOUTH TWICE A DAY  Qty: 60 tablet, Refills: 11    Associated Diagnoses: Paroxysmal atrial fibrillation ([address])      ! ! glimepiride (AMARYL) 2 mg tablet Take 1 tablet (2 mg total) by mouth daily with dinner  Qty: 30 tablet, Refills: 0    Associated Diagnoses: 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 ([address])      ! ! glimepiride (AMARYL) 4 mg tablet Take 1 tablet (4 mg total) by mouth daily with breakfast  Refills: 0    Associated Diagnoses: 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 ([facility])      latanoprost (XALATAN) 0.005 % ophthalmic solution 1 drop daily at bedtime      sitaGLIPtin (JANUVIA) 25 mg tablet Take 1 tablet (25 mg total) by mouth daily Do not start before February 28, 2023. Qty: 30 tablet, Refills: 0    Associated Diagnoses: 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 ([facility])      tamsulosin (FLOMAX) 0.4 mg Take 0.4 mg by mouth daily with dinner      timolol (TIMOPTIC) 0.5 % ophthalmic solution Administer 1 drop to both eyes daily    Associated Diagnoses: Glaucoma      ZINC OXIDE PO Take by mouth daily       ! ! - Potential duplicate medications found. Please discuss with provider.

## 2023-07-11 NOTE — ASSESSMENT & PLAN NOTE
· Hgb found to be decreasing during hospital visit  · Obtained FOBT returned positive, EGD on 07/10 found duodenal ulcer with clean base, likely the source of pt's bleed  · Protonix 40 mg BID  · Will transition to PO protonix 40 mg once daily  · F/u GI outpt

## 2023-07-11 NOTE — CASE MANAGEMENT
Case Management Discharge Planning Note    Patient name Alysha Timmons  Location /-13 MRN [id number]  : 1940 Date 2023       Current Admission Date: 2023  Current Admission Diagnosis:Pericardial effusion   Patient Active Problem List    Diagnosis Date Noted   • Encephalopathy 2023   • Empyema of lung ([address]) 2023   • Hypoglycemia 2023   • Thrombocytopenia ([address]) 2023   • Diarrhea 2023   • Hypothermia 2023   • Pleural effusion, right side 2023   • Septic shock ([address]) 2023   • Urinary retention 2023   • Macrocytosis 2023   • Hyponatremia 2023   • Anemia 2023   • Chronic kidney disease-mineral and bone disorder 2023   • Advanced care planning/counseling discussion 2023   • Acute kidney injury superimposed on chronic kidney disease ([address]) 2022   • Benign hypertension with CKD (chronic kidney disease) stage IV ([address]) 2022   • Persistent proteinuria 2022   • COVID-19 2022   • Electrolyte abnormality 2022   • Cellulitis of left upper extremity 2021   • Atrial fibrillation with slow ventricular response ([address]) 2021   • Vitamin D deficiency 10/18/2019   • Chronic combined systolic and diastolic congestive heart failure ([address]) 2019   • Pericardial effusion 2019   • Leg edema 01/10/2019   • 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 ([address]) 01/10/2019   • PVC (premature ventricular contraction) 2018   • Essential hypertension 2018   • Closed traumatic displaced fracture of distal end of left radius with malunion 2018      LOS (days): 13  Geometric Mean LOS (GMLOS) (days): 4.90  Days to GMLOS:-7.7     OBJECTIVE:  Risk of Unplanned Readmission Score: 30.68         Current admission status: Inpatient   Preferred Pharmacy:   [id number] Sue Gtz, [address] [place] [address] [address] [facility] [address]  Phone: [phone] Fax: [phone]    Primary Care Provider: Jesus Alberto Client, DO    Primary Insurance: MEDICARE  Secondary Insurance: BLUE CROSS    DISCHARGE DETAILS:    Discharge planning discussed with[de-identified] Марина singleton  Freedom of Choice: Yes  Comments - Freedom of Choice: d/c to [place], NO Covid swab needed  CM contacted family/caregiver?: Yes  Were Treatment Team discharge recommendations reviewed with patient/caregiver?: Yes  Did patient/caregiver verbalize understanding of patient care needs?: N/A- going to facility  Were patient/caregiver advised of the risks associated with not following Treatment Team discharge recommendations?: Yes    Contacts  Patient Contacts: Dim[facility] Up  Relationship to Patient[de-identified] Family  Contact Method: Phone  Phone Number: [phone]  Reason/Outcome: Continuity of Care, Discharge Planning, Emergency [facility]         Is the patient interested in [address] at discharge?: No         Other Referral/Resources/Interventions Provided:  Interventions: Short Term Rehab         Treatment Team Recommendation: Short Term Rehab  Discharge Destination Plan[de-identified] Short Term Rehab  Transport at Discharge : \[facility]\"" [facility]ulance  Dispatcher Contacted: Yes  Number/Name of Dispatcher: Round Trip     ETA of Transport (Date): 07/11/23                 IMM Given (Date):: 07/11/23  IMM Given to[de-identified] Family  Family notified[de-identified] Allen Ronquillo     IMM reviewed with patient's caregiver, patient's caregiver agrees with discharge determination. Confirmed 1430  Updated son Марина Deleon.

## 2023-07-11 NOTE — PROGRESS NOTES
Progress Note - Cardiology   Lauren Ventura 78 y.o. male MRN: [id number]  Unit/Bed#: -01 Encounter: [id number]  07/11/23  2:37 PM    Impression and Plan:    66-year-old with a history of atrial fibrillation-on Eliquis renal dose, type 2 diabetes, CKD, hypertension, chronic pericardial effusion-noted initially in 2019 presented with diarrhea and pneumonia, chest tube placed for effusion. Found to have increased size of the pericardial fusion with intermediate probability of tamponade and transferred to [address] [place] [address] with worsening hemodynamics on echocardiography, and ultimately underwent pericardiocentesis-175 cc-on 6/30/2023 and drained ultimately removed on 7/5/2023, with no residual pericardial effusion noted post removal on 7/6/2023 but with thickened pericardium. LV size and function are normal.    Just returned from EGD, denies any complaints at this time    Plan:    Large pericardial effusion with evidence of hemodynamic compromise on echo: Ultimately underwent pericardiocentesis with insertion of drain, removed after 5 days.   Post removal echo without recurrence but with thickened pericardium, will need outpatient follow-up for recurrence as well as to assess hemodynamics for constriction  Pericardial fluid cytology - benign  Easily auscultable heart sounds    Atrial fibrillation: Not on AV donnie blockers at baseline or currently, due to slow rates, at baseline was on Eliquis 2.5 mg twice daily  Baseline hemoglobin around 11 and currently around 7-8-plan -status post EGD yesterday clean-based duodenal ulcer, Beck's esophagus, biopsies taken, plan for PPI,  bleeding suspected from duodenal ulcer  Eliquis has been resumed    Chronic combined systolic and diastolic congestive heart failure: Initially diuretics were held in the setting of shock, subsequently underwent IV diuresis this admission for volume overload from fluid resuscitation, at baseline is on torsemide 40 mg daily and restarted, has minimal distal lower extremity edema    CKD: Baseline creatinine 2.5-3, creatinine back at baseline and stable    Diabetes and dyslipidemia: Last A1c of 8.3, no recent lipid profile, already on high intensity statin. Pneumonia and pleural effusion: Right chest tube and removal, status post antibiotics,    WILL SIGN OFF, PLEASE CALL WITH QUESTIONS OR CONCERNS        ===================================================================    Chief Complaint: No chief complaint on file. Subjective/Objective     Subjective: Denies any complaints     Objective: No distress    Patient Active Problem List   Diagnosis   • Closed traumatic displaced fracture of distal end of left radius with malunion   • PVC (premature ventricular contraction)   • Essential hypertension   • Leg edema   • 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 ([facility])   • Pericardial effusion   • Chronic combined systolic and diastolic congestive heart failure ([facility])   • Vitamin D deficiency   • Cellulitis of left upper extremity   • Atrial fibrillation with slow ventricular response ([facility])   • COVID-19   • Electrolyte abnormality   • Acute kidney injury superimposed on chronic kidney disease ([facility])   • Benign hypertension with CKD (chronic kidney disease) stage IV ([facility])   • Persistent proteinuria   • Anemia   • Chronic kidney disease-mineral and bone disorder   • Advanced care planning/counseling discussion   • Hyponatremia   • Macrocytosis   • Urinary retention   • Septic shock ([facility])   • Thrombocytopenia ([facility])   • Diarrhea   • Hypothermia   • Pleural effusion, right side   • Hypoglycemia   • Empyema of lung ([facility])   • Encephalopathy   • Duodenal ulcer       Vitals: /65   Pulse (!) 107   Temp (!) 96.9 °F (36.1 °C)   Resp (!) 24   Ht 5' 9" (1.753 m)   Wt 64.9 kg (143 lb 1.3 oz)   SpO2 98%   BMI 21.13 kg/m²     No intake/output data recorded.   Wt Readings from Last 3 Encounters:   07/10/23 64.9 kg (143 lb 1.3 oz)   06/28/23 74.5 kg (164 lb 3.9 oz)   05/11/23 66.7 kg (147 lb)     No intake or output data in the 24 hours ending 07/11/23 1437  I/O last 3 completed shifts: In: 100 [I.V.:100]  Out: -     Invasive Devices     Drain  Duration           External Urinary Catheter Small 1 day                  Physical Exam:  GEN: Addy Daly appears okay, alert and oriented x 3, pleasant and cooperative   HEENT: pupils equal, round, and reactive to light; extraocular muscles intact  NECK: supple, no carotid bruits or JVD  HEART: irregular rhythm, normal S1 and S2, no murmur, no clicks, gallops or rubs   LUNGS: clear to auscultation bilaterally; no wheezes or rhonchi, no  rales  ABDOMEN/GI: normal bowel sounds, soft, no tenderness, no distention  EXTREMITIES/Musculoskeltal: peripheral pulses normal; no clubbing, cyanosis, minimal distal lower extremity edema  NEURO: no focal motor findings   SKIN: normal without suspicious lesions on exposed skin              Lab Results:       Results from last 7 days   Lab Units 07/11/23  0547 07/10/23  0442 07/09/23  0508   WBC Thousand/uL 6.47 4.09* 4.50   HEMOGLOBIN g/dL 7.8* 7.5* 7.6*   HEMATOCRIT % 22.5* 23.4* 23.0*   PLATELETS Thousands/uL 262 134* 139*         Results from last 7 days   Lab Units 07/10/23  0442 07/09/23  0508 07/08/23  0413   POTASSIUM mmol/L 4.1 4.0 4.1   CHLORIDE mmol/L 99 100 101   CO2 mmol/L 31 30 32   BUN mg/dL 76* 80* 84*   CREATININE mg/dL 2.24* 2.22* 2.13*   CALCIUM mg/dL 8.1* 8.3 8.2*     Results from last 7 days   Lab Units 07/10/23  0758   INR  1.17       Imaging: I have personally reviewed pertinent reports.     EKG/Telemtry: No events    Scheduled Meds:  Current Facility-Administered Medications   Medication Dose Route Frequency Provider Last Rate   • acetaminophen  650 mg Oral Q4H PRN Albertina Kennedy PA-C     • apixaban  2.5 mg Oral BID Sofie Canavan, MD     • ascorbic acid  500 mg Oral Daily Tk Alston PA-C     • atorvastatin  40 mg Oral Daily With Kaylee Deleon PA-C     • cholecalciferol  2,000 Units Oral Daily Fred Campos PA-C     • docusate sodium  100 mg Oral BID Fred Campos PA-C     • HYDROmorphone  0.2 mg Intravenous Q4H PRN Paul Eddy PA-C     • insulin glargine  5 Units Subcutaneous HS Gisel Campbell MD     • insulin lispro  1-5 Units Subcutaneous TID AC SUSANA Romero     • insulin lispro  1-5 Units Subcutaneous HS SUSANA Romero     • insulin lispro  5 Units Subcutaneous TID With Meals Gisel Campbell MD     • latanoprost  1 drop Both Eyes HS Fred Campos PA-C     • melatonin  6 mg Oral HS Thomas Perez PA-C     • ondansetron  4 mg Intravenous Once SUSANA Romero     • ondansetron  4 mg Intravenous Q4H PRN Paul Eddy PA-C     • oxyCODONE  5 mg Oral Q4H PRN Paul Eddy PA-C     • oxyCODONE  2.5 mg Oral Q4H PRN Paul Eddy PA-C     • pantoprazole  40 mg Intravenous Q12H [address] Rafat Carola Sosa MD     • polyethylene glycol  17 g Oral Daily Fred Campos PA-C     • senna  2 tablet Oral HS Fred Campos PA-C     • tamsulosin  0.4 mg Oral Daily With Kaylee Deleon PA-C     • timolol  1 drop Both Eyes Daily Fred Campos PA-C     • torsemide  40 mg Oral Daily Fred Campso PA-C       Continuous Infusions:       VTE Pharmacologic Prophylaxis: Reason for no pharmacologic prophylaxis GI bleeding  VTE Mechanical Prophylaxis: sequential compression device    This note was completed in part utilizing [employer] direct voice recognition software. Grammatical errors, random word insertion, spelling mistakes, occasional wrong word or "sound-alike" substitutions and incomplete sentences may be an occasional consequence of the system secondary to software limitations, ambient noise and hardware issues.  At the time of dictation, efforts were made to edit, clarify and /or correct errors. Please read the chart carefully and recognize, using context, where substitutions have occurred. If you have any questions or concerns about the context, text or information contained within the body of this dictation, please contact myself, the provider, for further clarification. Normal

## 2023-07-11 NOTE — DISCHARGE SUMMARY
INTERNAL MEDICINE RESIDENCY DISCHARGE SUMMARY     Deyvi Gonzalez   78 y.o. male  MRN: [id number]  Room/Bed: /MS [address]  MED SURG 7   Encounter: [id number]    Principal Problem:    Pericardial effusion  Active Problems:    Chronic combined systolic and diastolic congestive heart failure (HCC)    Pleural effusion, right side    Encephalopathy    Urinary retention    Anemia    Acute kidney injury superimposed on chronic kidney disease (HCC)    Duodenal ulcer    Atrial fibrillation with slow ventricular response (HCC)    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 (HCC)      * Pericardial effusion  Assessment & Plan  • Large acute on chronic pericardial effusion concerning for possible early hemodynamic changes for tamponade  ? S/p pericardiocentesis 6/30  • Cultures are negative  • Repeat echo 7/3/2023 showed small loculated pericardial effusion at the apex, small effusion along the RA free wall  • PAC removed  • Repeat echo 7/6/23 showed concentric remodeling, biatrial dilation but no pericardial effusion. The pericardium was markedly thickened and marked ventricular septal motion abnormality.   • Pericardial drain removed on 07/05 per cards, will likely not need pericardial window d/t no recurrence of effusion on echo  • Due to effusion, home coreg and imdur were held, to be reassessed by cardiology outpt  • Will continue current course, per cardiology  • Needs cardiology follow up on discharge    Chronic combined systolic and diastolic congestive heart failure ([address])  Assessment & Plan  Wt Readings from Last 3 Encounters:   07/10/23 64.9 kg (143 lb 1.3 oz)   06/28/23 74.5 kg (164 lb 3.9 oz)   05/11/23 66.7 kg (147 lb)     • Diuretics held on admission in the setting of shock and KARINA  • Torsemide 40 mg daily restarted per cardiology  • 2.8L diuresis and net negative, exam still with 2+ pitting edema on 07/06  • Pitting edema significantly improved on 07/07  • Appears to be back to baseline on exam 07/10  • I/O, daily weight, low-sodium diet with fluid restriction    Pleural effusion, right side  Assessment & Plan  • Pneumonia C/F with right lower lobe empyema status post right chest tube  • Chest tube removed by IR on 07/07  • Patient has completed 7-day cefepime course    Encephalopathy  Assessment & Plan  • Likely toxic/metabolic secondary to critical illness and delirium  • Continue delirium precautions  • Patient with difficulty sleeping  • Melatonin and mirtazapine were added and critical care  • Improved mentation on 07/06 compared to previous, now AOx3  • Mentation seems to be closer to baseline per son    Urinary retention  Assessment & Plan  • Dupont placed for urinary retention  • Dupont catheter now removed  • Voiding well  • Will monitor I/Os  • Does have history of BPH, continue Flomax    Anemia  Assessment & Plan  • Hemoglobin 11.0 present on admission  • Platelet count with improvement at 126  • Hgb decreased from 8.7-7.9 x 2 -> 7.2 -> 7.1 -> 7.5 -> 7.8  • Blood consent obtained  • Obtained FOBT due to reported concern black stools by nurse on 07/07  • FOBT returned positive, EGD performed, found duodenal ulcer  • Likely source of bleed  • Continue trending CBC    Duodenal ulcer  Assessment & Plan  · Hgb found to be decreasing during hospital visit  · Obtained FOBT returned positive, EGD on 07/10 found duodenal ulcer with clean base, likely the source of pt's bleed  · Protonix 40 mg BID  · Will transition to PO protonix 40 mg once daily  · F/u GI outpt    Acute kidney injury superimposed on chronic kidney disease [facility])  Assessment & Plan  Lab Results   Component Value Date    EGFR 25 07/10/2023    EGFR 26 07/09/2023    EGFR 27 07/08/2023    CREATININE 2.24 (H) 07/10/2023    CREATININE 2.22 (H) 07/09/2023    CREATININE 2.13 (H) 07/08/2023     • Resolving, likely secondary cardiorenal  • Baseline creatinine 2.5-3, now at 2.24  • Patient is status post pericardiocentesis and diuresis  • Critical care held diuresis 6/20/2023 when fluid shifts secondary to large pericardial drainage  • Currently on torsemide 40 mg daily  • Now resolved    Atrial fibrillation with slow ventricular response (HCC)  Assessment & Plan  • Was holding Eliquis d/t downtrending Hgb  • FOBT positive, with EGD findings of duodenal ulcer with clean base, likely cause of anemia  • Restarted Eliquis 2.5 mg BID home dose, per GI    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 [facility])  Assessment & Plan  Lab Results   Component Value Date    HGBA1C 8.3 (H) 02/23/2023       Recent Labs     07/10/23  1128 07/10/23  1640 07/10/23  2105 07/11/23  0612   POCGLU 161* 126 282* 230*       Blood Sugar Average: Last 72 hrs:  (P) 183     • Added 10 units Lantus at at bedtime on 7/3/2023, hypoglycemic this morning, decrease to Lantus 5 units  • Added 5 units lispro 3 times daily with meals on 7/3/2023  • Continue sliding scale insulin algorithm 3        [address] COURSE     H&P per Dr. Zarina Galeano MD: "Bettie Allen is a 78 y.o. male with a PMH of A-fib, diabetes, heart failure, among others who presents with pericardial effusion. He arrives in transfer from [facility].  He was admitted initially on 6/24 for diarrhea with concern for sepsis. He was believed to have pneumonia with parapneumonic effusion, and KARINA associated with it. He initially required ICU admission for septic shock, but was successfully weaned off of pressors with improvement in his dynamic status. A chest tube was placed for his effusion, and he appears to be tolerating it. An echocardiogram obtained during his hospitalization showed a large pericardial effusion. This was known, but appears worsening since previous evaluation.  There does not appear to be signs of tamponade, but cardiology is recommending an evaluation prior to uptitrating heart failure meds. Patient was therefore accepted for transfer to [facility] is resting comfortably. He denies any pain, dyspnea, fever, chills or any other significant concerns"    For his pericardial effusion, patient subsequently required pericardiocentesis on 06/30 due to a large effusion with hemodynamic changes c/f tamponade. He was transferred to the ICU at the time for close monitoring. A pericardial drain was placed to allow for drainage and pulled by cardiology on 07/05 d/t resolution of drainage. Repeat echo at the time showed no pericardial effusion but did note some concentric remodeling and pericardial thickening. He also developed an KARINA and found to be fluid overloaded, believed to be cardiorenal d/t his heart failure, with diuresis held in the ICU but later started on torsemide 40 mg daily with resolution. Pt was on home coreg, imdur, and eliquis which were held at the time of the effusion. Eliquis was restarted at time of discharge with follow-up with cardiology. During his hospital course, patient also developed a right lower lobe empyema and became encephalopathic. For the empyema, he required a chest tube and 7 day course of cefepime. Pt was also give tPA/dornase and tube was pulled on 07/07 without any complications. As for his encephalopathy, he was placed on delirium precautions and was given melatonin and mirtazapine. Pt finally showed improvement in mentation on 07/06, at AOx3. He also developed urinary retention requiring a victor and was started on Flomax given his history of BPH. At time of discharge, victor was removed and was voiding appropriately. At the time of admission, pt presented with a hemoglobin of 11 but downtrended during his hospital stay, down to 7.1. Anticoagulation was held at the time and was found to be FOBT+ after nurse noted black BM by pt. Subsequent EGD found duodenal ulcer with clean base to be likely source of bleed.  Biopsies taken during EGD found no evidence of H. Pylori infection or malignancy. Pt was placed on protonix 40 mg BID and discharged with follow up with gastroenterology. DISCHARGE INFORMATION     Patient was seen and examined. Was sleeping comfortably. Stated he was feeling well and wanted to go home. He denied any complaints this morning including     Vitals:    07/11/23 0730   BP: 111/65   Pulse: (!) 107   Resp:    Temp: (!) 96.9 °F (36.1 °C)   SpO2: 98%     Physical Exam  Vitals reviewed. HENT:      Head: Normocephalic and atraumatic. Mouth/Throat:      Mouth: Mucous membranes are dry. Pharynx: Oropharynx is clear. Eyes:      Conjunctiva/sclera: Conjunctivae normal.   Cardiovascular:      Rate and Rhythm: Normal rate and regular rhythm. Pulses: Normal pulses. Heart sounds: Normal heart sounds. No murmur heard. No friction rub. No gallop. Pulmonary:      Effort: Pulmonary effort is normal. No respiratory distress. Breath sounds: Normal breath sounds. Abdominal:      General: Abdomen is flat. Bowel sounds are normal. There is no distension. Palpations: Abdomen is soft. Tenderness: There is no abdominal tenderness. Musculoskeletal:      Right lower leg: Edema present. Left lower leg: Edema present. Comments: Trace edema   Skin:     General: Skin is warm and dry. Neurological:      Mental Status: He is alert. Sensory: No sensory deficit.       Comments: AOx2 to person and place       PCP at Discharge: Dr. Lico Sauceda DO    Admitting Provider: Leopold Spire, MD  Admission Date: 6/28/2023    Discharge Provider: Sundeep Adamson DO   Discharge Date: 07/11/2023    Discharge Disposition: [facility]  Discharge Condition: stable  Discharge with Lines: no    Discharge Diet: diabetic diet  Activity Restrictions: none  Test Results Pending at Discharge: none    Discharge Diagnoses:  Principal Problem:    Pericardial effusion  Active Problems:    Chronic combined systolic and diastolic congestive heart failure (HCC)    Pleural effusion, right side    Encephalopathy    Urinary retention    Anemia    Acute kidney injury superimposed on chronic kidney disease ([address])    Duodenal ulcer    Atrial fibrillation with slow ventricular response (HCC)    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 ([address])  Resolved Problems:    * No resolved hospital problems. *      Consulting Providers:   Gastroenterology  Pulmonology  Cardiology  Thoracic Surgery  Oncology    Diagnostic & Therapeutic Procedures Performed:  XR chest portable ICU    Result Date: 7/2/2023  Impression: Pericardial drain with slight decrease in marked enlargement of cardiac silhouette from pericardial effusion. Persistent small loculated right basilar hydropneumothorax and small left effusion. Persistent right base atelectasis. Workstation performed: [id number]     XR chest portable ICU    Result Date: 7/1/2023  Impression: Persistent small loculated right hydropneumothorax with moderate left effusion and bibasilar atelectasis. Pulmonary artery catheter in right interlobar pulmonary artery, subsequently retracted. Workstation performed: [id number]     XR chest portable ICU    Result Date: 7/1/2023  Impression: Pulmonary artery catheter in main pulmonary artery. Persistent enlargement of the cardiac silhouette with pericardial drain. Right pleural pigtail catheter with stable small loculated right hydropneumothorax and mild bibasilar atelectasis.  Workstation performed: [id number]       Code Status: Level 3 - DNAR and DNI  Advance Directive & Living Will: <no information>  Power of :    POLST:      Medications:  Current Discharge Medication List      STOP taking these medications       carvedilol (COREG) 6.25 mg tablet Comments:   Reason for Stopping:         isosorbide mononitrate (IMDUR) 30 mg 24 hr tablet Comments:   Reason for Stopping:             Current Discharge Medication List      START taking these medications    Details   pantoprazole (PROTONIX) 40 mg tablet Take 1 tablet (40 mg total) by mouth daily  Qty: 30 tablet, Refills: 0    Associated Diagnoses: Duodenal ulcer           Current Discharge Medication List      CONTINUE these medications which have NOT CHANGED    Details   allopurinol (ZYLOPRIM) 100 mg tablet Take 100 mg by mouth 2 (two) times a day      ALPRAZolam (XANAX) 0.5 mg tablet 0.5 mg daily at bedtime   Refills: 0      ascorbic acid (VITAMIN C) 500 MG tablet Take 1 tablet (500 mg total) by mouth daily  Refills: 0    Associated Diagnoses: Cellulitis of left hand      atorvastatin (LIPITOR) 40 mg tablet Take 1 tablet (40 mg total) by mouth daily with dinner  Qty: 30 tablet, Refills: 0    Associated Diagnoses: NSTEMI (non-ST elevated myocardial infarction) ([facility])      Blood Pressure Monitor NILTON by Does not apply route daily  Qty: 1 Device, Refills: 0    Associated Diagnoses: 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 ([facility])      Eliquis 2.5 MG TAKE ONE TABLET BY MOUTH TWICE A DAY  Qty: 60 tablet, Refills: 11    Associated Diagnoses: Paroxysmal atrial fibrillation ([address])      ! ! glimepiride (AMARYL) 2 mg tablet Take 1 tablet (2 mg total) by mouth daily with dinner  Qty: 30 tablet, Refills: 0    Associated Diagnoses: 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 ([address])      ! ! glimepiride (AMARYL) 4 mg tablet Take 1 tablet (4 mg total) by mouth daily with breakfast  Refills: 0    Associated Diagnoses: 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 ([facility])      latanoprost (XALATAN) 0.005 % ophthalmic solution 1 drop daily at bedtime      sitaGLIPtin (JANUVIA) 25 mg tablet Take 1 tablet (25 mg total) by mouth daily Do not start before February 28, 2023.   Qty: 30 tablet, Refills: 0    Associated Diagnoses: 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 (HCC)      tamsulosin (FLOMAX) 0.4 mg Take 0.4 mg by mouth daily with dinner      timolol (TIMOPTIC) 0.5 % ophthalmic solution Administer 1 drop to both eyes daily    Associated Diagnoses: Glaucoma      ZINC OXIDE PO Take by mouth daily       ! ! - Potential duplicate medications found. Please discuss with provider. Allergies: Allergies   Allergen Reactions   • Elemental Sulfur    • Sulfa Antibiotics        FOLLOW-UP     PCP Outpatient Follow-up:  yes      Follow up: PCP  Date and time: 1 week  Location: PCP  Follow up within next: 1 week    Consulting Providers Follow-up:  yes      Physician name: Caryn Aguilar  Specialty: Cardiology  Office phone number: [phone]  Follow up within next: 1 month     Physician name: Héctor Healy [facility]  Specialty: Gastroenterology  Office phone number: [phone]  Follow up within next: 1 month     Active Issues Requiring Follow-up:   none    Discharge Statement:   I spent 45 minutes minutes discharging the patient. This time was spent on the day of discharge. I had direct contact with the patient on the day of discharge. Additional documentation is required if more than 30 minutes were spent on discharge. Portions of the record may have been created with voice recognition software. Occasional wrong word or "sound a like" substitutions may have occurred due to the inherent limitations of voice recognition software.   Read the chart carefully and recognize, using context, where substitutions have occurred.    ==  Kiana Atkinson MD  1 [facility]  Internal Medicine Resident PGY-1

## 2023-07-11 NOTE — RESULT ENCOUNTER NOTE
Dr. Marquita Tomlinson DO,  Chucho Medinanas Javier results were normal and he has been notified via [address] [place] [address].

## 2023-07-12 LAB
ANA ELISA RESULT: NEGATIVE RATIO
ANTINUCLEAR ANTIBODY BY ELISA: NORMAL
Lab: NORMAL
RATIO: 0.1

## 2023-07-17 LAB
FUNGUS SPEC CULT: NORMAL
MYCOBACTERIUM SPEC CULT: NORMAL
RHODAMINE-AURAMINE STN SPEC: NORMAL

## 2023-07-24 LAB — FUNGUS SPEC CULT: NORMAL

## 2023-07-25 LAB
MYCOBACTERIUM SPEC CULT: NORMAL
RHODAMINE-AURAMINE STN SPEC: NORMAL

## 2023-07-27 ENCOUNTER — HOSPITAL ENCOUNTER (OUTPATIENT)
Dept: RADIOLOGY | Facility: HOSPITAL | Age: 83
Discharge: HOME | End: 2023-07-27
Attending: FAMILY MEDICINE
Payer: MEDICARE

## 2023-07-27 ENCOUNTER — HOSPITAL ENCOUNTER (INPATIENT)
Facility: HOSPITAL | Age: 83
LOS: 14 days | Discharge: NURSING, REHAB OR LONG-TERM CARE | DRG: 871 | End: 2023-08-10
Attending: EMERGENCY MEDICINE | Admitting: INTERNAL MEDICINE
Payer: MEDICARE

## 2023-07-27 DIAGNOSIS — J86.9 EMPYEMA OF LUNG (HCC): ICD-10-CM

## 2023-07-27 DIAGNOSIS — K59.00 CONSTIPATION: ICD-10-CM

## 2023-07-27 DIAGNOSIS — N18.4 TYPE 2 DIABETES MELLITUS WITH STAGE 4 CHRONIC KIDNEY DISEASE AND HYPERTENSION (HCC): Chronic | ICD-10-CM

## 2023-07-27 DIAGNOSIS — J18.9 PNEUMONIA: ICD-10-CM

## 2023-07-27 DIAGNOSIS — I12.9 TYPE 2 DIABETES MELLITUS WITH STAGE 4 CHRONIC KIDNEY DISEASE AND HYPERTENSION (HCC): Chronic | ICD-10-CM

## 2023-07-27 DIAGNOSIS — N17.9 AKI (ACUTE KIDNEY INJURY) (HCC): Primary | ICD-10-CM

## 2023-07-27 DIAGNOSIS — Z71.89 GOALS OF CARE, COUNSELING/DISCUSSION: ICD-10-CM

## 2023-07-27 DIAGNOSIS — A41.9 SEPSIS (HCC): ICD-10-CM

## 2023-07-27 DIAGNOSIS — E11.22 TYPE 2 DIABETES MELLITUS WITH STAGE 4 CHRONIC KIDNEY DISEASE AND HYPERTENSION (HCC): Chronic | ICD-10-CM

## 2023-07-27 DIAGNOSIS — R09.02 HYPOXIA: ICD-10-CM

## 2023-07-27 DIAGNOSIS — J69.0 ASPIRATION PNEUMONITIS (HCC): ICD-10-CM

## 2023-07-27 DIAGNOSIS — J90 PLEURAL EFFUSION EXUDATIVE: ICD-10-CM

## 2023-07-27 LAB
ALBUMIN SERPL BCP-MCNC: 3.1 G/DL (ref 3.5–5)
ALP SERPL-CCNC: 134 U/L (ref 34–104)
ALT SERPL W P-5'-P-CCNC: 21 U/L (ref 7–52)
ANION GAP SERPL CALCULATED.3IONS-SCNC: 6 MMOL/L
APTT PPP: 41 SECONDS (ref 23–37)
AST SERPL W P-5'-P-CCNC: 20 U/L (ref 13–39)
BASOPHILS # BLD AUTO: 0.01 THOUSANDS/ÂΜL (ref 0–0.1)
BASOPHILS NFR BLD AUTO: 0 % (ref 0–1)
BILIRUB SERPL-MCNC: 0.57 MG/DL (ref 0.2–1)
BILIRUB UR QL STRIP: NEGATIVE
BUN SERPL-MCNC: 36 MG/DL (ref 5–25)
CALCIUM ALBUM COR SERPL-MCNC: 9.2 MG/DL (ref 8.3–10.1)
CALCIUM SERPL-MCNC: 8.5 MG/DL (ref 8.4–10.2)
CHLORIDE SERPL-SCNC: 96 MMOL/L (ref 96–108)
CLARITY UR: ABNORMAL
CO2 SERPL-SCNC: 31 MMOL/L (ref 21–32)
COLOR UR: ABNORMAL
CREAT SERPL-MCNC: 2.8 MG/DL (ref 0.6–1.3)
EOSINOPHIL # BLD AUTO: 0.02 THOUSAND/ÂΜL (ref 0–0.61)
EOSINOPHIL NFR BLD AUTO: 0 % (ref 0–6)
ERYTHROCYTE [DISTWIDTH] IN BLOOD BY AUTOMATED COUNT: 14.8 % (ref 11.6–15.1)
FERRITIN SERPL-MCNC: 127 NG/ML (ref 24–336)
GFR SERPL CREATININE-BSD FRML MDRD: 19 ML/MIN/1.73SQ M
GLUCOSE SERPL-MCNC: 174 MG/DL (ref 65–140)
GLUCOSE UR STRIP-MCNC: ABNORMAL MG/DL
HCT VFR BLD AUTO: 29.2 % (ref 36.5–49.3)
HGB BLD-MCNC: 9.3 G/DL (ref 12–17)
HGB UR QL STRIP.AUTO: NEGATIVE
IMM GRANULOCYTES # BLD AUTO: 0.02 THOUSAND/UL (ref 0–0.2)
IMM GRANULOCYTES NFR BLD AUTO: 0 % (ref 0–2)
INR PPP: 1.44 (ref 0.84–1.19)
IRON SATN MFR SERPL: 17 % (ref 20–50)
IRON SERPL-MCNC: 37 UG/DL (ref 65–175)
KETONES UR STRIP-MCNC: NEGATIVE MG/DL
LACTATE SERPL-SCNC: 1.3 MMOL/L (ref 0.5–2)
LACTATE SERPL-SCNC: 2.8 MMOL/L (ref 0.5–2)
LEUKOCYTE ESTERASE UR QL STRIP: NEGATIVE
LYMPHOCYTES # BLD AUTO: 0.76 THOUSANDS/ÂΜL (ref 0.6–4.47)
LYMPHOCYTES NFR BLD AUTO: 17 % (ref 14–44)
MCH RBC QN AUTO: 32.5 PG (ref 26.8–34.3)
MCHC RBC AUTO-ENTMCNC: 31.8 G/DL (ref 31.4–37.4)
MCV RBC AUTO: 102 FL (ref 82–98)
MONOCYTES # BLD AUTO: 0.36 THOUSAND/ÂΜL (ref 0.17–1.22)
MONOCYTES NFR BLD AUTO: 8 % (ref 4–12)
NEUTROPHILS # BLD AUTO: 3.35 THOUSANDS/ÂΜL (ref 1.85–7.62)
NEUTS SEG NFR BLD AUTO: 75 % (ref 43–75)
NITRITE UR QL STRIP: NEGATIVE
NRBC BLD AUTO-RTO: 0 /100 WBCS
PH UR STRIP.AUTO: 6 [PH]
PLATELET # BLD AUTO: 127 THOUSANDS/UL (ref 149–390)
PMV BLD AUTO: 9.5 FL (ref 8.9–12.7)
POTASSIUM SERPL-SCNC: 4.7 MMOL/L (ref 3.5–5.3)
PROCALCITONIN SERPL-MCNC: 0.2 NG/ML
PROT SERPL-MCNC: 5.7 G/DL (ref 6.4–8.4)
PROT UR STRIP-MCNC: NEGATIVE MG/DL
PROTHROMBIN TIME: 17.7 SECONDS (ref 11.6–14.5)
RBC # BLD AUTO: 2.86 MILLION/UL (ref 3.88–5.62)
SODIUM SERPL-SCNC: 133 MMOL/L (ref 135–147)
SP GR UR STRIP.AUTO: <=1.005 (ref 1–1.03)
TIBC SERPL-MCNC: 223 UG/DL (ref 250–450)
UROBILINOGEN UR QL STRIP.AUTO: 0.2 E.U./DL
WBC # BLD AUTO: 4.52 THOUSAND/UL (ref 4.31–10.16)

## 2023-07-27 PROCEDURE — 85730 THROMBOPLASTIN TIME PARTIAL: CPT | Performed by: EMERGENCY MEDICINE

## 2023-07-27 PROCEDURE — 99285 EMERGENCY DEPT VISIT HI MDM: CPT | Performed by: EMERGENCY MEDICINE

## 2023-07-27 PROCEDURE — 80053 COMPREHEN METABOLIC PANEL: CPT | Performed by: EMERGENCY MEDICINE

## 2023-07-27 PROCEDURE — 87040 BLOOD CULTURE FOR BACTERIA: CPT | Performed by: EMERGENCY MEDICINE

## 2023-07-27 PROCEDURE — 99223 1ST HOSP IP/OBS HIGH 75: CPT | Performed by: INTERNAL MEDICINE

## 2023-07-27 PROCEDURE — 83540 ASSAY OF IRON: CPT | Performed by: INTERNAL MEDICINE

## 2023-07-27 PROCEDURE — 83605 ASSAY OF LACTIC ACID: CPT | Performed by: EMERGENCY MEDICINE

## 2023-07-27 PROCEDURE — 87086 URINE CULTURE/COLONY COUNT: CPT | Performed by: EMERGENCY MEDICINE

## 2023-07-27 PROCEDURE — 83550 IRON BINDING TEST: CPT | Performed by: INTERNAL MEDICINE

## 2023-07-27 PROCEDURE — 71250 CT THORAX DX C-: CPT

## 2023-07-27 PROCEDURE — 82728 ASSAY OF FERRITIN: CPT | Performed by: INTERNAL MEDICINE

## 2023-07-27 PROCEDURE — 85610 PROTHROMBIN TIME: CPT | Performed by: EMERGENCY MEDICINE

## 2023-07-27 PROCEDURE — 85025 COMPLETE CBC W/AUTO DIFF WBC: CPT | Performed by: EMERGENCY MEDICINE

## 2023-07-27 PROCEDURE — 99285 EMERGENCY DEPT VISIT HI MDM: CPT

## 2023-07-27 PROCEDURE — 36415 COLL VENOUS BLD VENIPUNCTURE: CPT | Performed by: EMERGENCY MEDICINE

## 2023-07-27 PROCEDURE — 81003 URINALYSIS AUTO W/O SCOPE: CPT | Performed by: EMERGENCY MEDICINE

## 2023-07-27 PROCEDURE — G1004 CDSM NDSC: HCPCS

## 2023-07-27 PROCEDURE — 84145 PROCALCITONIN (PCT): CPT | Performed by: INTERNAL MEDICINE

## 2023-07-27 PROCEDURE — 96365 THER/PROPH/DIAG IV INF INIT: CPT

## 2023-07-27 RX ORDER — TAMSULOSIN HYDROCHLORIDE 0.4 MG/1
0.4 CAPSULE ORAL
Status: DISCONTINUED | OUTPATIENT
Start: 2023-07-28 | End: 2023-08-10 | Stop reason: HOSPADM

## 2023-07-27 RX ORDER — ATORVASTATIN CALCIUM 40 MG/1
40 TABLET, FILM COATED ORAL
Status: DISCONTINUED | OUTPATIENT
Start: 2023-07-28 | End: 2023-08-10 | Stop reason: HOSPADM

## 2023-07-27 RX ORDER — METRONIDAZOLE 500 MG/100ML
500 INJECTION, SOLUTION INTRAVENOUS ONCE
Status: COMPLETED | OUTPATIENT
Start: 2023-07-27 | End: 2023-07-27

## 2023-07-27 RX ORDER — CEFTRIAXONE 1 G/50ML
1000 INJECTION, SOLUTION INTRAVENOUS ONCE
Status: COMPLETED | OUTPATIENT
Start: 2023-07-27 | End: 2023-07-27

## 2023-07-27 RX ORDER — ACETAMINOPHEN 160 MG/5ML
650 SUSPENSION ORAL EVERY 4 HOURS PRN
Status: DISCONTINUED | OUTPATIENT
Start: 2023-07-27 | End: 2023-07-28

## 2023-07-27 RX ORDER — TORSEMIDE 20 MG/1
20 TABLET ORAL DAILY
COMMUNITY
End: 2023-08-10

## 2023-07-27 RX ORDER — TIMOLOL MALEATE 5 MG/ML
1 SOLUTION/ DROPS OPHTHALMIC DAILY
Status: DISCONTINUED | OUTPATIENT
Start: 2023-07-28 | End: 2023-08-10 | Stop reason: HOSPADM

## 2023-07-27 RX ORDER — CEFEPIME HYDROCHLORIDE 1 G/50ML
1000 INJECTION, SOLUTION INTRAVENOUS EVERY 12 HOURS
Status: DISCONTINUED | OUTPATIENT
Start: 2023-07-27 | End: 2023-08-03

## 2023-07-27 RX ORDER — ONDANSETRON 2 MG/ML
4 INJECTION INTRAMUSCULAR; INTRAVENOUS EVERY 6 HOURS PRN
Status: DISCONTINUED | OUTPATIENT
Start: 2023-07-27 | End: 2023-08-10 | Stop reason: HOSPADM

## 2023-07-27 RX ORDER — ALLOPURINOL 100 MG/1
100 TABLET ORAL 2 TIMES DAILY
Status: DISCONTINUED | OUTPATIENT
Start: 2023-07-27 | End: 2023-08-10 | Stop reason: HOSPADM

## 2023-07-27 RX ORDER — METRONIDAZOLE 500 MG/100ML
500 INJECTION, SOLUTION INTRAVENOUS EVERY 8 HOURS
Status: DISCONTINUED | OUTPATIENT
Start: 2023-07-27 | End: 2023-07-31

## 2023-07-27 RX ADMIN — CEFEPIME HYDROCHLORIDE 1000 MG: 1 INJECTION, SOLUTION INTRAVENOUS at 22:45

## 2023-07-27 RX ADMIN — APIXABAN 2.5 MG: 2.5 TABLET, FILM COATED ORAL at 22:44

## 2023-07-27 RX ADMIN — METRONIDAZOLE 500 MG: 500 INJECTION, SOLUTION INTRAVENOUS at 17:51

## 2023-07-27 RX ADMIN — CEFTRIAXONE 1000 MG: 1 INJECTION, SOLUTION INTRAVENOUS at 17:05

## 2023-07-27 RX ADMIN — SODIUM CHLORIDE 1000 ML: 0.9 INJECTION, SOLUTION INTRAVENOUS at 17:51

## 2023-07-27 RX ADMIN — METRONIDAZOLE 500 MG: 500 INJECTION, SOLUTION INTRAVENOUS at 22:45

## 2023-07-27 RX ADMIN — ALLOPURINOL 100 MG: 100 TABLET ORAL at 22:44

## 2023-07-27 NOTE — ED PROVIDER NOTES
History  Chief Complaint   Patient presents with   • Evaluation of Abnormal Diagnostic Test     Kal brought patient here because [facility] called them that the patient has a positive result of build up on his lungs from CT Scan done yesterday and they wanted the patient to be evaluated. 77 yo male sent from NH for abnormal results of outpt. Chest CT done today for hypoxia per order. Pt. Doesn't know why he is here or why he had the CT scan. He says he is on oxygen all the time. He denies sob or chest pain. He denies abdominal pain, vomiting, diarrhea.  + cough - but he says no phlegm. No fever noted on transfer sheet. History provided by:  Patient and nursing home   used: No    Evaluation of Abnormal Diagnostic Test      Prior to Admission Medications   Prescriptions Last Dose Informant Patient Reported? Taking? ALPRAZolam (XANAX) 0.5 mg tablet  Child Yes No   Si.5 mg daily at bedtime    Blood Pressure Monitor NILTON  Child No No   Sig: by Does not apply route daily   Eliquis 2.5 MG   No No   Sig: TAKE ONE TABLET BY MOUTH TWICE A DAY   ZINC OXIDE PO  Child Yes No   Sig: Take by mouth daily   allopurinol (ZYLOPRIM) 100 mg tablet  Child Yes No   Sig: Take 100 mg by mouth 2 (two) times a day   ascorbic acid (VITAMIN C) 500 MG tablet  Child No No   Sig: Take 1 tablet (500 mg total) by mouth daily   atorvastatin (LIPITOR) 40 mg tablet  Child No No   Sig: Take 1 tablet (40 mg total) by mouth daily with dinner   cholecalciferol (VITAMIN D3) 1,000 units tablet   No No   Sig: Take 2 tablets (2,000 Units total) by mouth daily Do not start before 2023.    glimepiride (AMARYL) 2 mg tablet  Child No No   Sig: Take 1 tablet (2 mg total) by mouth daily with dinner   glimepiride (AMARYL) 4 mg tablet  Child No No   Sig: Take 1 tablet (4 mg total) by mouth daily with breakfast   latanoprost (XALATAN) 0.005 % ophthalmic solution  Child Yes No   Si drop daily at bedtime pantoprazole (PROTONIX) 40 mg tablet   No No   Sig: Take 1 tablet (40 mg total) by mouth daily   sitaGLIPtin (JANUVIA) 25 mg tablet  Child No No   Sig: Take 1 tablet (25 mg total) by mouth daily Do not start before February 28, 2023. tamsulosin (FLOMAX) 0.4 mg  Child Yes No   Sig: Take 0.4 mg by mouth daily with dinner   timolol (TIMOPTIC) 0.5 % ophthalmic solution  Child No No   Sig: Administer 1 drop to both eyes daily   torsemide 40 MG TABS   No No   Sig: Take 40 mg by mouth daily Do not start before July 12, 2023. Facility-Administered Medications: None       Past Medical History:   Diagnosis Date   • Atrial fibrillation [facility])    • Chronic kidney disease    • Coronary artery disease    • Diabetes mellitus ([facility])    • Hyperlipidemia    • Hypertension        Past Surgical History:   Procedure Laterality Date   • CARDIAC CATHETERIZATION N/A 6/30/2023    Procedure: Cardiac pericardiocentesis; Surgeon: Munira Obregon DO;  Location: [facility] CARDIAC [facility] LAB; Service: Cardiology   • CARDIAC CATHETERIZATION N/A 6/30/2023    Procedure: Cardiac RHC; Surgeon: Munira Obregon DO;  Location: [facility] CARD[facility]AC [facility]; Service: Cardiology   • EYE SURGERY     • IR CHEST TUBE PLACEMENT  6/24/2023   • SKIN CANCER EXCISION     • TONSILLECTOMY         Family History   Problem Relation Age of Onset   • Heart disease Mother    • Diabetes Father    • Vision loss Father    • Cancer Sister    • Diabetes Sister      I have reviewed and agree with the history as documented.     E-Cigarette/Vaping   • E-Cigarette Use Never User      E-Cigarette/Vaping Substances   • Nicotine No    • THC No    • CBD No    • Flavoring No    • Other No    • Unknown No      Social History     Tobacco Use   • Smoking status: Former   • Smokeless tobacco: Former   Vaping Use   • Vaping Use: Never used   Substance Use Topics   • Alcohol use: Not Currently     Alcohol/week: 0.0 standard drinks of alcohol     Comment: special occasions   • Drug use: Not Currently       Review of Systems   Constitutional: Negative for fever. Respiratory: Positive for cough. Negative for shortness of breath. Cardiovascular: Negative for chest pain. Gastrointestinal: Negative for abdominal pain and vomiting. Physical Exam  Physical Exam  Vitals and nursing note reviewed. Constitutional:       General: He is not in acute distress. Appearance: He is well-developed. He is ill-appearing. He is not diaphoretic. HENT:      Head: Normocephalic and atraumatic. Eyes:      General: No scleral icterus. Conjunctiva/sclera: Conjunctivae normal.   Cardiovascular:      Rate and Rhythm: Normal rate and regular rhythm. Heart sounds: Normal heart sounds. No murmur heard. Pulmonary:      Effort: Pulmonary effort is normal. No respiratory distress. Breath sounds: Examination of the right-middle field reveals rhonchi. Examination of the right-lower field reveals rhonchi. Examination of the left-lower field reveals rhonchi. Rhonchi present. Chest:      Chest wall: No tenderness. Abdominal:      General: Bowel sounds are normal. There is no distension. Palpations: Abdomen is soft. Tenderness: There is no abdominal tenderness. Musculoskeletal:         General: No tenderness or deformity. Normal range of motion. Cervical back: Normal range of motion and neck supple. Right lower leg: Edema present. Left lower leg: Edema present. Skin:     General: Skin is warm and dry. Coloration: Skin is not pale. Findings: Bruising present. No erythema or rash. Neurological:      General: No focal deficit present. Mental Status: He is alert. Cranial Nerves: No cranial nerve deficit.    Psychiatric:         Mood and Affect: Mood normal.         Behavior: Behavior normal.         Vital Signs  ED Triage Vitals   Temperature Pulse Respirations Blood Pressure SpO2   07/27/23 1636 07/27/23 1636 07/27/23 1641 07/27/23 1641 07/27/23 1636 97.9 °F (36.6 °C) 84 (!) 24 131/58 99 %      Temp Source Heart Rate Source Patient Position - Orthostatic VS BP Location FiO2 (%)   07/27/23 1636 -- -- -- --   Oral          Pain Score       07/27/23 1636       No Pain           Vitals:    07/27/23 1636 07/27/23 1641   BP:  131/58   Pulse: 84          Visual Acuity      ED Medications  Medications   cefTRIAXone (ROCEPHIN) IVPB (premix in dextrose) 1,000 mg 50 mL (1,000 mg Intravenous New Bag 7/27/23 1705)   metroNIDAZOLE (FLAGYL) IVPB (premix) 500 mg 100 mL (has no administration in time range)   sodium chloride 0.9 % bolus 1,000 mL (has no administration in time range)       Diagnostic Studies  Results Reviewed     Procedure Component Value Units Date/Time    Lactic acid, plasma (w/reflex if result > 2.0) [[id number]]  (Abnormal) Collected: 07/27/23 1648    Lab Status: Final result Specimen: Blood from Arm, Left Updated: 07/27/23 1727     LACTIC ACID 2.8 mmol/L     Narrative:      Result may be elevated if tourniquet was used during collection.     Lactic acid 2 Hours [[id number]]     Lab Status: No result Specimen: Blood     Urine culture [[id number]] Collected: 07/27/23 1722    Lab Status: No result Specimen: Urine, Clean Catch     UA (URINE) with reflex to Scope [[id number]] Collected: 07/27/23 1719    Lab Status: No result Specimen: Urine, Clean Catch     Comprehensive metabolic panel [[id number]]  (Abnormal) Collected: 07/27/23 1648    Lab Status: Final result Specimen: Blood from Arm, Left Updated: 07/27/23 1713     Sodium 133 mmol/L      Potassium 4.7 mmol/L      Chloride 96 mmol/L      CO2 31 mmol/L      ANION GAP 6 mmol/L      BUN 36 mg/dL      Creatinine 2.80 mg/dL      Glucose 174 mg/dL      Calcium 8.5 mg/dL      Corrected Calcium 9.2 mg/dL      AST 20 U/L      ALT 21 U/L      Alkaline Phosphatase 134 U/L      Total Protein 5.7 g/dL      Albumin 3.1 g/dL      Total Bilirubin 0.57 mg/dL      eGFR 19 ml/min/1.73sq m     Narrative:      National Kid[employer] Disease Foundation guidelines for Chronic Kidney Disease (CKD):   •  Stage 1 with normal or high GFR (GFR > 90 mL/min/1.73 square meters)  •  Stage 2 Mild CKD (GFR = 60-89 mL/min/1.73 square meters)  •  Stage 3A Moderate CKD (GFR = 45-59 mL/min/1.73 square meters)  •  Stage 3B Moderate CKD (GFR = 30-44 mL/min/1.73 square meters)  •  Stage 4 Severe CKD (GFR = 15-29 mL/min/1.73 square meters)  •  Stage 5 End Stage CKD (GFR <15 mL/min/1.73 square meters)  Note: GFR calculation is accurate only with a steady state creatinine    Protime-INR [[id number]]  (Abnormal) Collected: 07/27/23 1648    Lab Status: Final result Specimen: Blood from Arm, Left Updated: 07/27/23 1709     Protime 17.7 seconds      INR 1.44    APTT [[id number]]  (Abnormal) Collected: 07/27/23 1648    Lab Status: Final result Specimen: Blood from Arm, Left Updated: 07/27/23 1709     PTT 41 seconds     Blood culture #1 [[id number]] Collected: 07/27/23 1659    Lab Status: In process Specimen: Blood from Arm, Left Updated: 07/27/23 1705    Blood culture #2 [[id number]] Collected: 07/27/23 1654    Lab Status:  In process Specimen: Blood from Arm, Left Updated: 07/27/23 1705    CBC and differential [[id number]]  (Abnormal) Collected: 07/27/23 1648    Lab Status: Final result Specimen: Blood from Arm, Left Updated: 07/27/23 1657     WBC 4.52 Thousand/uL      RBC 2.86 Million/uL      Hemoglobin 9.3 g/dL      Hematocrit 29.2 %       fL      MCH 32.5 pg      MCHC 31.8 g/dL      RDW 14.8 %      MPV 9.5 fL      Platelets 318 Thousands/uL      nRBC 0 /100 WBCs      Neutrophils Relative 75 %      Immat GRANS % 0 %      Lymphocytes Relative 17 %      Monocytes Relative 8 %      Eosinophils Relative 0 %      Basophils Relative 0 %      Neutrophils Absolute 3.35 Thousands/µL      Immature Grans Absolute 0.02 Thousand/uL      Lymphocytes Absolute 0.76 Thousands/µL      Monocytes Absolute 0.36 Thousand/µL      Eosinophils Absolute 0.02 Thousand/µL      Basophils Absolute 0.01 Thousands/µL                  No orders to display              Procedures  Procedures         ED Course                            Initial Sepsis Screening     Row Name 07/27/23 1729                Is the patient's history suggestive of a new or worsening infection? Yes (Proceed)  -ST        Suspected source of infection pneumonia  -ST        Indicate SIRS criteria Tachypnea > 20 resp per min  -ST        Are two or more of the above signs & symptoms of infection both present and new to the patient? Yes (Proceed)  -ST        Assess for evidence of organ dysfunction: Are any of the below criteria present within 6 hours of suspected infection and SIRS criteria that are NOT considered to be chronic conditions? Lactate > 2. 0;Creatinine > 2.0 AND > 0.5 above baseline  -ST        Date of presentation of severe sepsis --        Time of presentation of severe sepsis --        Sepsis Note: Click "NEXT" below (NOT "close") to generate sepsis note based on above information. --              User Key  (r) = Recorded By, (t) = Taken By, (c) = Cosigned By    [address] [place] [address] Name Provider Toro Coleman MD Physician                SBIRT 22yo+    Flowsheet Row Most Recent Value   Initial Alcohol Screen: US AUDIT-C     1. How often do you have a drink containing alcohol? 0 Filed at: 07/27/2023 1643   2. How many drinks containing alcohol do you have on a typical day you are drinking? 0 Filed at: 07/27/2023 1643   3a. Male UNDER 65: How often do you have five or more drinks on one occasion? 0 Filed at: 07/27/2023 1643   Audit-C Score 0 Filed at: 07/27/2023 1643   ALEJANDRO: How many times in the past year have you. .. Used an illegal drug or used a prescription medication for non-medical reasons? Never Filed at: 07/27/2023 1643                    Medical Decision Making  Prior records reviewed. Pt. Admitted last month for pericardial effusion, s/p pericardiocentesis 6/30.   Had CT chest today which showed occlusion of the bronchus intermedius, right middle and lower bronchi as well as consolidation of entire right lung c/w aspiration pneumonitis. 1715 - WBC ok, Lactic elevated at 2.8. Pt. With mild KARINA with Cr. From 7/10 2.24 and now today up to 2.80. Discussed with [facility] for admission. Technically pt. Does not have 2 SIRS criteria but known aspiration pneumonia on CT and evidence of end organ damage so appropriate abx given. Amount and/or Complexity of Data Reviewed  Labs: ordered. Risk  Prescription drug management. Decision regarding hospitalization. Disposition  Final diagnoses:   KARINA (acute kidney injury) ([address])   Aspiration pneumonitis ([address])   Sepsis ([address])     Time reflects when diagnosis was documented in both MDM as applicable and the Disposition within this note     Time User Action Codes Description Comment    3/50/5967  7:39 PM Kristy ALVAREZ Add [G89.0] KARINA (acute kidney injury) ([address])     1/89/3043  4:31 PM Kristy ALVAREZ Add [H51.8] Aspiration pneumonitis ([address])     5/76/3184  2:00 PM Kristy ALVAREZ Add [W91.3] Sepsis [facility])       ED Disposition     ED Disposition   Admit    Condition   Stable    Date/Time   Thu Jul 27, 2023  5:23 PM    Comment   Case was discussed with *FRANKLYN** and the patient's admission status was agreed to be Admission Status: inpatient status           Follow-up Information    None         Patient's Medications   Discharge Prescriptions    No medications on file       No discharge procedures on file.     PDMP Review     None          ED Provider  Electronically Signed by           Kristy Vega MD  78/26/23 5960

## 2023-07-27 NOTE — ASSESSMENT & PLAN NOTE
Wt Readings from Last 3 Encounters:   07/10/23 64.9 kg (143 lb 1.3 oz)   06/28/23 74.5 kg (164 lb 3.9 oz)   05/11/23 66.7 kg (147 lb)     No evidence of exacerbation, appears below historical dry weight of around 164 pounds. Hold diuretics  Give IV fluid, monitor.   Can give diuretic as needed

## 2023-07-27 NOTE — ASSESSMENT & PLAN NOTE
We will check iron panel, anemia seems improved from previous discharge 2 weeks ago  No acute indication for transfusion

## 2023-07-27 NOTE — ASSESSMENT & PLAN NOTE
Lab Results   Component Value Date    EGFR 19 07/27/2023    EGFR 25 07/10/2023    EGFR 26 07/09/2023    CREATININE 2.80 (H) 07/27/2023    CREATININE 2.24 (H) 07/10/2023    CREATININE 2.22 (H) 07/09/2023   Patient with KARINA on CKD stage IV  Elevated to 2.80, likely prerenal   Renally dose all medication

## 2023-07-27 NOTE — ASSESSMENT & PLAN NOTE
Lab Results   Component Value Date    HGBA1C 8.3 (H) 02/23/2023       No results for input(s): "POCGLU" in the last 72 hours. Blood Sugar Average: Last 72 hrs:  Home regimen reviewed. Hold Metformin if applicable.   Start SSI and Basal bolus protocol

## 2023-07-27 NOTE — ASSESSMENT & PLAN NOTE
Patient with aspiration pneumonia, with large and of debris in the bronchus  N.p.o. for now  Cefepime day #1  Metronidazole day #1  Check procalcitonin, low threshold to discontinue  We will ask pulmonary to see, will defer on thoracentesis as patient has previous loculated effusion  Consider goals of care  Speech consultation for the morning  Lactic acidosis due to increased work of breathing, will give IV fluid and trend

## 2023-07-27 NOTE — H&P
[address] [facility]  H&P  Name: June Jones 78 y.o. male I MRN: [id number]  Unit/Bed#: ED 01 I Date of Admission: 7/27/2023   Date of Service: 7/27/2023 I Hospital Day: 0      Assessment and Plan  * Aspiration pneumonia [facility])  Assessment & Plan  Patient with aspiration pneumonia, with large and of debris in the bronchus  N.p.o. for now  Cefepime day #1  Metronidazole day #1  Check procalcitonin, low threshold to discontinue  We will ask pulmonary to see, will defer on thoracentesis as patient has previous loculated effusion  Consider goals of care  Speech consultation for the morning    Empyema of lung ([address])  Assessment & Plan  Empyema of the lung requiring chest tube placement and 7-day course of cefepime.   Also required tPA dornase  Pulmonary consultation placed to discuss possible thoracentesis versus chest tube placement given dense consolidation      Hyponatremia  Assessment & Plan  Recent Labs     07/27/23  1648   SODIUM 133*   Mild hyponatremia, close monitor with IV fluid      Anemia  Assessment & Plan  We will check iron panel, anemia seems improved from previous discharge 2 weeks ago  No acute indication for transfusion    Acute kidney injury superimposed on chronic kidney disease [facility])  Assessment & Plan  Lab Results   Component Value Date    EGFR 19 07/27/2023    EGFR 25 07/10/2023    EGFR 26 07/09/2023    CREATININE 2.80 (H) 07/27/2023    CREATININE 2.24 (H) 07/10/2023    CREATININE 2.22 (H) 07/09/2023   Patient with KARINA on CKD stage IV  Elevated to 2.80, likely prerenal   Renally dose all medication    Atrial fibrillation with slow ventricular response (HCC)  Assessment & Plan  Ventricle rate controlled  Continue Eliquis    Chronic combined systolic and diastolic congestive heart failure (HCC)  Assessment & Plan  Wt Readings from Last 3 Encounters:   07/10/23 64.9 kg (143 lb 1.3 oz)   06/28/23 74.5 kg (164 lb 3.9 oz)   05/11/23 66.7 kg (147 lb)     No evidence of exacerbation, appears below historical dry weight of around 164 pounds. Hold diuretics  Give IV fluid, monitor. Can give diuretic as needed        Pericardial effusion  Assessment & Plan  Patient received pericardiocentesis on 6/30/2023 for tamponade, recovered. 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 ([address])  Assessment & Plan  Lab Results   Component Value Date    HGBA1C 8.3 (H) 02/23/2023       No results for input(s): "POCGLU" in the last 72 hours. Blood Sugar Average: Last 72 hrs:  Home regimen reviewed. Hold Metformin if applicable. Start SSI and Basal bolus protocol    Essential hypertension  Assessment & Plan  Torsemide on hold, resume other home medication      Code Status: Level 3    VTE Prophylaxis: Apixaban (Eliquis)  / sequential compression device     POLST: There is no POLST form on file for this patient (pre-hospital)  Discussion with family: Son    Anticipated Length of Stay:  Patient will be admitted on an Inpatient basis with an anticipated length of stay of greater than 2 midnights. Justification for Hospital Stay: Aspiration pneumonia [facility])     Total Time for Visit, including Counseling / Coordination of Care: 1 hour. Greater than 50% of this total time spent on direct patient counseling and coordination of care. Chief Complaint:     Chief Complaint   Patient presents with   • Evaluation of Abnormal Diagnostic Test     Squad brought patient here because [facility] called them that the patient has a positive result of build up on his lungs from CT Scan done yesterday and they wanted the patient to be evaluated. History of Present Illness:    Abraham Portillo is a 78 y.o. male who presents with shortness of breath and difficulty breathing. The patient had a CT scan performed 24 hours ago which showed dense consolidation in the lung. .  This was a difficult examination due to patient acuity. History was obtained from son via telephone.   The patient has a past medical history of atrial fibrillation, diabetes melitis, heart failure. He was previously admitted at the beginning of the month for paracardial effusion and subsequently required pericardiocentesis on 6/30 to concern for tamponade. Yonas echocardiogram was within normal limits. During that hospital stay the patient had a right lower lobe empyema and became encephalopathic. He required a chest tube and 7-day course of cefepime. He also received tPA and dornase and tube was pulled on 7 /7 without any complications. Patient remains high risk for delirium he did have urinary retention for which he was started on Flomax. He was subsequently discharged back to a nursing facility and now returns for acute shortness of breath. On my evaluation the patient reports no pain, he is complaining of thirst.  He has no nausea vomiting or diarrhea. Review of Systems:    A complete and comprehensive 14 point organ system review was performed and all other systems are negative other than stated above in the HPI    Past Medical and Surgical History:     Past Medical History:   Diagnosis Date   • Atrial fibrillation ([address])    • Chronic kidney disease    • Coronary artery disease    • Diabetes mellitus ([address])    • Hyperlipidemia    • Hypertension        Past Surgical History:   Procedure Laterality Date   • CARDIAC CATHETERIZATION N/A 6/30/2023    Procedure: Cardiac pericardiocentesis; Surgeon: Brenda Laws DO;  Location: [facility] CARDIAC CATH LAB; Service: Cardiology   • CARDIAC CATHETERIZATION N/A 6/30/2023    Procedure: Cardiac RHC; Surgeon: Brenda Laws DO;  Location: [facility] CARDIAC CATH LAB; Service: Cardiology   • EYE SURGERY     • IR CHEST TUBE PLACEMENT  6/24/2023   • SKIN CANCER EXCISION     • TONSILLECTOMY         Meds/Allergies:    Prior to Admission medications    Medication Sig Start Date End Date Taking?  Authorizing Provider   allopurinol (ZYLOPRIM) 100 mg tablet Take 100 mg by mouth 2 (two) times a day    Historical Provider, MD   ALPLACIEZolam Pierre Ritchie) 0.5 mg tablet 0.5 mg daily at bedtime  3/12/18   Historical Provider, MD   ascorbic acid (VITAMIN C) 500 MG tablet Take 1 tablet (500 mg total) by mouth daily 1/8/21   Davis Myles MD   atorvastatin (LIPITOR) 40 mg tablet Take 1 tablet (40 mg total) by mouth daily with dinner 1/16/19   Kathy Crawford,    Blood Pressure Monitor NILTON by Does not apply route daily 2/19/20   Varsha Gomez MD   cholecalciferol (VITAMIN D3) 1,000 units tablet Take 2 tablets (2,000 Units total) by mouth daily Do not start before July 12, 2023. 7/12/23 8/11/23  Delilah Cross MD   Eliquis 2.5 MG TAKE ONE TABLET BY MOUTH TWICE A DAY 7/10/23   Madi Bullard MD   glimepiride (AMARYL) 2 mg tablet Take 1 tablet (2 mg total) by mouth daily with dinner 2/27/23   Davis Myles MD   glimepiride (AMARYL) 4 mg tablet Take 1 tablet (4 mg total) by mouth daily with breakfast 2/27/23   Davis Myles MD   latanoprost (XALATAN) 0.005 % ophthalmic solution 1 drop daily at bedtime    Historical Provider, MD   pantoprazole (PROTONIX) 40 mg tablet Take 1 tablet (40 mg total) by mouth daily 7/11/23 8/10/23  Delilah Cross MD   sitaGLIPtin (JANUVIA) 25 mg tablet Take 1 tablet (25 mg total) by mouth daily Do not start before February 28, 2023. 2/28/23   Davis Myles MD   tamsulosin (FLOMAX) 0.4 mg Take 0.4 mg by mouth daily with dinner    Historical Provider, MD   timolol (TIMOPTIC) 0.5 % ophthalmic solution Administer 1 drop to both eyes daily 1/7/21   Davis Myles MD   torsemide 40 MG TABS Take 40 mg by mouth daily Do not start before July 12, 2023. 7/12/23 8/11/23  Delilah Cross MD   ZINC OXIDE PO Take by mouth daily    Historical Provider, MD ABARCA have reviewed home medications with patient personally. Allergies: Allergies   Allergen Reactions   • Elemental Sulfur    • Sulfa Antibiotics        Social History:     Marital Status:     Occupation: Retired    Substance Use History:   Social History     Substance and Sexual Activity   Alcohol Use Not Currently   • Alcohol/week: 0.0 standard drinks of alcohol    Comment: special occasions     Social History     Tobacco Use   Smoking Status Former   Smokeless Tobacco Former     Social History     Substance and Sexual Activity   Drug Use Not Currently       Family History:    Family History   Problem Relation Age of Onset   • Heart disease Mother    • Diabetes Father    • Vision loss Father    • Cancer Sister    • Diabetes Sister        Physical Exam:     Vitals:   Blood Pressure: 131/58 (07/27/23 1641)  Pulse: 84 (07/27/23 1636)  Temperature: 97.9 °F (36.6 °C) (07/27/23 1636)  Temp Source: Oral (07/27/23 1636)  Respirations: (!) 24 (07/27/23 1641)  SpO2: 99 % (07/27/23 1636)      General: ill appearing, no acute distress  HEENT: atraumatic, PERRLA, dry oral mucosa, normal pharynx, normal tonsils and adenoids, normal tongue, no fluid in sinuses  Neck: Trachea midline, no carotid bruit, no masses  Respiratory: normal chest wall expansion, coarse breath sounds with significant decreased breath sounds on the right side, no r/r/w, no rubs  Cardiovascular: RRR, no m/r/g, Normal S1 and S2  Abdomen: Soft, non-tender, non-distended, normal bowel sounds in all quadrants, no hepatosplenomegaly, no tympany  Rectal: deferred  Musculoskeletal: Moves all  Integumentary: warm, dry, and pink, with no rash, purpura, or petechia  Heme/Lymph: no lymphadenopathy, no bruises  Neurological: Cranial Nerves II-XII grossly intact  Psychiatric: cooperative with normal mood, affect, and cognition    Additional Data:     Lab Results: I have personally reviewed pertinent reports.       Results from last 7 days   Lab Units 07/27/23  1648   WBC Thousand/uL 4.52   HEMOGLOBIN g/dL 9.3*   HEMATOCRIT % 29.2*   PLATELETS Thousands/uL 127*   NEUTROS PCT % 75   LYMPHS PCT % 17   MONOS PCT % 8   EOS PCT % 0     Results from last 7 days   Lab Units 07/27/23  1648   SODIUM mmol/L 133*   POTASSIUM mmol/L 4.7   CHLORIDE mmol/L 96   CO2 mmol/L 31   BUN mg/dL 36*   CREATININE mg/dL 2.80*   ANION GAP mmol/L 6   CALCIUM mg/dL 8.5   ALBUMIN g/dL 3.1*   TOTAL BILIRUBIN mg/dL 0.57   ALK PHOS U/L 134*   ALT U/L 21   AST U/L 20   GLUCOSE RANDOM mg/dL 174*     Results from last 7 days   Lab Units 07/27/23  1648   INR  1.44*             Results from last 7 days   Lab Units 07/27/23  1648   LACTIC ACID mmol/L 2.8*           Imaging: I have personally reviewed pertinent reports. No orders to display       EKG, Pathology, and Other Studies Reviewed on Admission:   · The of the chest reviewed with large sided consolidation on the right.,  Loculated    Allscripts / Epic Records Reviewed: Yes     ** Please Note: This note was completed in part utilizing M-Modal Fluency Direct Software. Grammatical errors, random word insertions, spelling mistakes, and incomplete sentences may be an occasional consequence of this system secondary to software limitations, ambient noise, and hardware issues. If you have any questions or concerns about the content, text, or information contained within the body of this dictation, please contact the provider for clarification. **

## 2023-07-27 NOTE — ED NOTES
Called floor for report, awaiting room to be clean for patient transportation. Floor will call down when room is ready.       Kevon Mariano RN  07/27/23 2218

## 2023-07-27 NOTE — ASSESSMENT & PLAN NOTE
Empyema of the lung requiring chest tube placement and 7-day course of cefepime.   Also required tPA dornase  Pulmonary consultation placed to discuss possible thoracentesis versus chest tube placement given dense consolidation

## 2023-07-28 ENCOUNTER — APPOINTMENT (OUTPATIENT)
Dept: NON INVASIVE DIAGNOSTICS | Facility: HOSPITAL | Age: 83
DRG: 871 | End: 2023-07-28
Attending: INTERNAL MEDICINE
Payer: MEDICARE

## 2023-07-28 PROBLEM — Z71.89 GOALS OF CARE, COUNSELING/DISCUSSION: Status: ACTIVE | Noted: 2023-07-28

## 2023-07-28 LAB
ANION GAP SERPL CALCULATED.3IONS-SCNC: 6 MMOL/L
APPEARANCE FLD: NORMAL
BACTERIA UR CULT: NORMAL
BASOPHILS # BLD AUTO: 0.01 THOUSANDS/ÂΜL (ref 0–0.1)
BASOPHILS NFR BLD AUTO: 0 % (ref 0–1)
BUN SERPL-MCNC: 36 MG/DL (ref 5–25)
CALCIUM SERPL-MCNC: 8.2 MG/DL (ref 8.4–10.2)
CHLORIDE SERPL-SCNC: 101 MMOL/L (ref 96–108)
CO2 SERPL-SCNC: 29 MMOL/L (ref 21–32)
COLOR FLD: NORMAL
CREAT SERPL-MCNC: 2.59 MG/DL (ref 0.6–1.3)
EOSINOPHIL # BLD AUTO: 0.02 THOUSAND/ÂΜL (ref 0–0.61)
EOSINOPHIL NFR BLD AUTO: 1 % (ref 0–6)
ERYTHROCYTE [DISTWIDTH] IN BLOOD BY AUTOMATED COUNT: 14.7 % (ref 11.6–15.1)
GFR SERPL CREATININE-BSD FRML MDRD: 21 ML/MIN/1.73SQ M
GLUCOSE SERPL-MCNC: 86 MG/DL (ref 65–140)
HCT VFR BLD AUTO: 26.1 % (ref 36.5–49.3)
HGB BLD-MCNC: 8.6 G/DL (ref 12–17)
HISTIOCYTES NFR FLD: 1 %
IMM GRANULOCYTES # BLD AUTO: 0.03 THOUSAND/UL (ref 0–0.2)
IMM GRANULOCYTES NFR BLD AUTO: 1 % (ref 0–2)
LDH FLD L TO P-CCNC: 91 U/L
LYMPHOCYTES # BLD AUTO: 0.69 THOUSANDS/ÂΜL (ref 0.6–4.47)
LYMPHOCYTES NFR BLD AUTO: 17 % (ref 14–44)
LYMPHOCYTES NFR BLD AUTO: 88 %
MCH RBC QN AUTO: 33.5 PG (ref 26.8–34.3)
MCHC RBC AUTO-ENTMCNC: 33 G/DL (ref 31.4–37.4)
MCV RBC AUTO: 102 FL (ref 82–98)
MONO+MESO NFR FLD MANUAL: 1 %
MONOCYTES # BLD AUTO: 0.42 THOUSAND/ÂΜL (ref 0.17–1.22)
MONOCYTES NFR BLD AUTO: 10 % (ref 4–12)
MONOCYTES NFR BLD AUTO: 5 %
NEUTROPHILS # BLD AUTO: 2.97 THOUSANDS/ÂΜL (ref 1.85–7.62)
NEUTS SEG NFR BLD AUTO: 5 %
NEUTS SEG NFR BLD AUTO: 71 % (ref 43–75)
NRBC BLD AUTO-RTO: 0 /100 WBCS
PH BODY FLUID: 7.8
PLATELET # BLD AUTO: 113 THOUSANDS/UL (ref 149–390)
PMV BLD AUTO: 9.8 FL (ref 8.9–12.7)
POTASSIUM SERPL-SCNC: 3.8 MMOL/L (ref 3.5–5.3)
PROCALCITONIN SERPL-MCNC: 0.19 NG/ML
PROT FLD-MCNC: <2 G/DL
RBC # BLD AUTO: 2.57 MILLION/UL (ref 3.88–5.62)
SITE: NORMAL
SODIUM SERPL-SCNC: 136 MMOL/L (ref 135–147)
TOTAL CELLS COUNTED SPEC: 100
WBC # BLD AUTO: 4.14 THOUSAND/UL (ref 4.31–10.16)
WBC # FLD MANUAL: 49 /UL

## 2023-07-28 PROCEDURE — C1729 CATH, DRAINAGE: HCPCS

## 2023-07-28 PROCEDURE — 85025 COMPLETE CBC W/AUTO DIFF WBC: CPT | Performed by: INTERNAL MEDICINE

## 2023-07-28 PROCEDURE — 89051 BODY FLUID CELL COUNT: CPT | Performed by: INTERNAL MEDICINE

## 2023-07-28 PROCEDURE — 87081 CULTURE SCREEN ONLY: CPT | Performed by: INTERNAL MEDICINE

## 2023-07-28 PROCEDURE — 80048 BASIC METABOLIC PNL TOTAL CA: CPT | Performed by: INTERNAL MEDICINE

## 2023-07-28 PROCEDURE — 99223 1ST HOSP IP/OBS HIGH 75: CPT | Performed by: INTERNAL MEDICINE

## 2023-07-28 PROCEDURE — 83615 LACTATE (LD) (LDH) ENZYME: CPT | Performed by: INTERNAL MEDICINE

## 2023-07-28 PROCEDURE — 94760 N-INVAS EAR/PLS OXIMETRY 1: CPT

## 2023-07-28 PROCEDURE — 92610 EVALUATE SWALLOWING FUNCTION: CPT

## 2023-07-28 PROCEDURE — 94664 DEMO&/EVAL PT USE INHALER: CPT

## 2023-07-28 PROCEDURE — 94668 MNPJ CHEST WALL SBSQ: CPT

## 2023-07-28 PROCEDURE — 99232 SBSQ HOSP IP/OBS MODERATE 35: CPT | Performed by: INTERNAL MEDICINE

## 2023-07-28 PROCEDURE — 94640 AIRWAY INHALATION TREATMENT: CPT

## 2023-07-28 PROCEDURE — 94669 MECHANICAL CHEST WALL OSCILL: CPT

## 2023-07-28 PROCEDURE — 32557 INSERT CATH PLEURA W/ IMAGE: CPT

## 2023-07-28 PROCEDURE — 87205 SMEAR GRAM STAIN: CPT | Performed by: INTERNAL MEDICINE

## 2023-07-28 PROCEDURE — 32557 INSERT CATH PLEURA W/ IMAGE: CPT | Performed by: RADIOLOGY

## 2023-07-28 PROCEDURE — 0W9930Z DRAINAGE OF RIGHT PLEURAL CAVITY WITH DRAINAGE DEVICE, PERCUTANEOUS APPROACH: ICD-10-PCS | Performed by: STUDENT IN AN ORGANIZED HEALTH CARE EDUCATION/TRAINING PROGRAM

## 2023-07-28 PROCEDURE — 84157 ASSAY OF PROTEIN OTHER: CPT | Performed by: INTERNAL MEDICINE

## 2023-07-28 PROCEDURE — 84145 PROCALCITONIN (PCT): CPT | Performed by: INTERNAL MEDICINE

## 2023-07-28 PROCEDURE — 87070 CULTURE OTHR SPECIMN AEROBIC: CPT | Performed by: INTERNAL MEDICINE

## 2023-07-28 PROCEDURE — 97163 PT EVAL HIGH COMPLEX 45 MIN: CPT

## 2023-07-28 PROCEDURE — C1769 GUIDE WIRE: HCPCS

## 2023-07-28 PROCEDURE — 83986 ASSAY PH BODY FLUID NOS: CPT | Performed by: INTERNAL MEDICINE

## 2023-07-28 RX ORDER — FENTANYL CITRATE 50 UG/ML
INJECTION, SOLUTION INTRAMUSCULAR; INTRAVENOUS AS NEEDED
Status: COMPLETED | OUTPATIENT
Start: 2023-07-28 | End: 2023-07-28

## 2023-07-28 RX ORDER — LEVALBUTEROL INHALATION SOLUTION 1.25 MG/3ML
1.25 SOLUTION RESPIRATORY (INHALATION)
Status: DISCONTINUED | OUTPATIENT
Start: 2023-07-28 | End: 2023-08-09

## 2023-07-28 RX ORDER — ACETAMINOPHEN 160 MG/5ML
975 SUSPENSION ORAL EVERY 8 HOURS
Status: DISCONTINUED | OUTPATIENT
Start: 2023-07-28 | End: 2023-08-10 | Stop reason: HOSPADM

## 2023-07-28 RX ORDER — LIDOCAINE HYDROCHLORIDE 10 MG/ML
INJECTION, SOLUTION EPIDURAL; INFILTRATION; INTRACAUDAL; PERINEURAL AS NEEDED
Status: COMPLETED | OUTPATIENT
Start: 2023-07-28 | End: 2023-07-28

## 2023-07-28 RX ADMIN — TAMSULOSIN HYDROCHLORIDE 0.4 MG: 0.4 CAPSULE ORAL at 16:51

## 2023-07-28 RX ADMIN — LEVALBUTEROL HYDROCHLORIDE 1.25 MG: 1.25 SOLUTION RESPIRATORY (INHALATION) at 19:58

## 2023-07-28 RX ADMIN — ALLOPURINOL 100 MG: 100 TABLET ORAL at 09:11

## 2023-07-28 RX ADMIN — CEFEPIME HYDROCHLORIDE 1000 MG: 1 INJECTION, SOLUTION INTRAVENOUS at 09:18

## 2023-07-28 RX ADMIN — APIXABAN 2.5 MG: 2.5 TABLET, FILM COATED ORAL at 09:12

## 2023-07-28 RX ADMIN — METRONIDAZOLE 500 MG: 500 INJECTION, SOLUTION INTRAVENOUS at 22:55

## 2023-07-28 RX ADMIN — TIMOLOL MALEATE 1 DROP: 5 SOLUTION/ DROPS OPHTHALMIC at 09:10

## 2023-07-28 RX ADMIN — LEVALBUTEROL HYDROCHLORIDE 1.25 MG: 1.25 SOLUTION RESPIRATORY (INHALATION) at 13:37

## 2023-07-28 RX ADMIN — CEFEPIME HYDROCHLORIDE 1000 MG: 1 INJECTION, SOLUTION INTRAVENOUS at 21:33

## 2023-07-28 RX ADMIN — ALLOPURINOL 100 MG: 100 TABLET ORAL at 16:52

## 2023-07-28 RX ADMIN — LIDOCAINE HYDROCHLORIDE 20 ML: 10 INJECTION, SOLUTION EPIDURAL; INFILTRATION; INTRACAUDAL; PERINEURAL at 16:17

## 2023-07-28 RX ADMIN — METRONIDAZOLE 500 MG: 500 INJECTION, SOLUTION INTRAVENOUS at 05:19

## 2023-07-28 RX ADMIN — ATORVASTATIN CALCIUM 40 MG: 40 TABLET, FILM COATED ORAL at 16:52

## 2023-07-28 RX ADMIN — ACETAMINOPHEN 975 MG: 650 SUSPENSION ORAL at 18:35

## 2023-07-28 RX ADMIN — FENTANYL CITRATE 25 MCG: 50 INJECTION, SOLUTION INTRAMUSCULAR; INTRAVENOUS at 16:27

## 2023-07-28 RX ADMIN — APIXABAN 2.5 MG: 2.5 TABLET, FILM COATED ORAL at 16:53

## 2023-07-28 RX ADMIN — METRONIDAZOLE 500 MG: 500 INJECTION, SOLUTION INTRAVENOUS at 14:29

## 2023-07-28 NOTE — ASSESSMENT & PLAN NOTE
Patient with aspiration pneumonia, with large and of debris in the bronchus  IV Cefepime and IV Flagyl   Check procalcitonin, low threshold to discontinue  Dysphagia diet per speech/input appreciated   Lactic acidosis due to increased work of breathing, normalized after IVF

## 2023-07-28 NOTE — PLAN OF CARE
Problem: INFECTION - ADULT  Goal: Absence or prevention of progression during hospitalization  Description: INTERVENTIONS:  - Assess and monitor for signs and symptoms of infection  - Monitor lab/diagnostic results  - Monitor all insertion sites, i.e. indwelling lines, tubes, and drains  - Monitor endotracheal if appropriate and nasal secretions for changes in amount and color  - [place] appropriate cooling/warming therapies per order  - Administer medications as ordered  - Instruct and encourage patient and family to use good hand hygiene technique  - Identify and instruct in appropriate isolation precautions for identified infection/condition  Outcome: Progressing  Goal: Absence of fever/infection during neutropenic period  Description: INTERVENTIONS:  - Monitor WBC    Outcome: Progressing     Problem: METABOLIC, FLUID AND ELECTROLYTES - ADULT  Goal: Electrolytes maintained within normal limits  Description: INTERVENTIONS:  - Monitor labs and assess patient for signs and symptoms of electrolyte imbalances  - Administer electrolyte replacement as ordered  - Monitor response to electrolyte replacements, including repeat lab results as appropriate  - Instruct patient on fluid and nutrition as appropriate  Outcome: Progressing  Goal: Fluid balance maintained  Description: INTERVENTIONS:  - Monitor labs   - Monitor I/O and WT  - Instruct patient on fluid and nutrition as appropriate  - Assess for signs & symptoms of volume excess or deficit  Outcome: Progressing

## 2023-07-28 NOTE — PLAN OF CARE
Problem: METABOLIC, FLUID AND ELECTROLYTES - ADULT  Goal: Electrolytes maintained within normal limits  Description: INTERVENTIONS:  - Monitor labs and assess patient for signs and symptoms of electrolyte imbalances  - Administer electrolyte replacement as ordered  - Monitor response to electrolyte replacements, including repeat lab results as appropriate  - Instruct patient on fluid and nutrition as appropriate  Outcome: Progressing  Goal: Fluid balance maintained  Description: INTERVENTIONS:  - Monitor labs   - Monitor I/O and WT  - Instruct patient on fluid and nutrition as appropriate  - Assess for signs & symptoms of volume excess or deficit  Outcome: Progressing     Problem: RESPIRATORY - ADULT  Goal: Achieves optimal ventilation and oxygenation  Description: INTERVENTIONS:  - Assess for changes in respiratory status  - Assess for changes in mentation and behavior  - Position to facilitate oxygenation and minimize respiratory effort  - Oxygen administered by appropriate delivery if ordered  - Initiate smoking cessation education as indicated  - Encourage broncho-pulmonary hygiene including cough, deep breathe, Incentive Spirometry  - Assess the need for suctioning and aspirate as needed  - Assess and instruct to report SOB or any respiratory difficulty  - Respiratory Therapy support as indicated  Outcome: Progressing     Problem: MOBILITY - ADULT  Goal: Maintain or return to baseline ADL function  Description: INTERVENTIONS:  -  Assess patient's ability to carry out ADLs; assess patient's baseline for ADL function and identify physical deficits which impact ability to perform ADLs (bathing, care of mouth/teeth, toileting, grooming, dressing, etc.)  - Assess/evaluate cause of self-care deficits   - Assess range of motion  - Assess patient's mobility; develop plan if impaired  - Assess patient's need for assistive devices and provide as appropriate  - Encourage maximum independence but intervene and supervise when necessary  - Involve family in performance of ADLs  - Assess for home care needs following discharge   - Consider OT consult to assist with ADL evaluation and planning for discharge  - Provide patient education as appropriate  Outcome: Progressing  Goal: Maintains/Returns to pre admission functional level  Description: INTERVENTIONS:  - Perform BMAT or MOVE assessment daily.   - Set and communicate daily mobility goal to care team and patient/family/caregiver. - Collaborate with rehabilitation services on mobility goals if consulted  - Perform Range of Motion 2 times a day. - Reposition patient every 2 hours.   - Dangle patient 2 times a day  - Stand patient 2 times a day  - Ambulate patient 2 times a day  - Out of bed to chair 2 times a day   - Out of bed for meals 2 times a day  - Out of bed for toileting  - Record patient progress and toleration of activity level   Outcome: Progressing

## 2023-07-28 NOTE — PLAN OF CARE
Pt presented with functional appearing oral and pharyngeal stage swallowing skills with the conservative materials administered today (tsps applesauce, 4 ozs honey/mod thick liquid, 4 ozs nectar/mildly thick liquid. Delayed cough x1 noted with sips of water (can not r/o aspiration).      Recommendation:  Consider VBS/MBS, consider puree/level 1 diet and nectar thick liquids   Recommended Form of Meds: whole with puree or thick liquid  Aspiration precautions and swallowing strategies: upright posture, only feed when fully alert and slow rate of feeding  Other Recommendations: Continue frequent oral care

## 2023-07-28 NOTE — SPEECH THERAPY NOTE
Speech-Language Pathology Bedside Swallow Evaluation      Patient Name: Emir Healy    ARPZM'X Date: 7/28/2023     Problem List  Principal Problem:    Aspiration pneumonia [facility])  Active Problems:    Essential hypertension    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 (HCC)    Pericardial effusion    Chronic combined systolic and diastolic congestive heart failure (HCC)    Atrial fibrillation with slow ventricular response (HCC)    Acute kidney injury superimposed on chronic kidney disease ([address])    Anemia    Hyponatremia    Empyema of lung ([address])      Past Medical History  Past Medical History:   Diagnosis Date   • Atrial fibrillation ([address])    • Chronic kidney disease    • Coronary artery disease    • Diabetes mellitus ([address])    • Hyperlipidemia    • Hypertension          Summary   Pt presented with functional appearing oral and pharyngeal stage swallowing skills with the conservative materials administered today (tsps applesauce, 4 ozs honey/mod thick liquid, 4 ozs nectar/mildly thick liquid. Delayed cough x1 noted with sips of water (can not r/o aspiration). Recommendation:  Consider VBS/MBS, consider puree/level 1 diet and nectar thick liquids   Recommended Form of Meds: whole with puree or thick liquid  Aspiration precautions and swallowing strategies: upright posture, only feed when fully alert and slow rate of feeding  Other Recommendations: Continue frequent oral care        Current Medical Status  Emir Healy is a 79 yo SNF resident with PMH of atrial fibrillation, diabetes melitis, heart failure. He was previously admitted at the beginning of the month for paracardial effusion and subsequently required pericardiocentesis on 6/30 to concern for tamponade. During that hospital stay the patient had a right lower lobe empyema and became encephalopathic. He required a chest tube and 7-day course of cefepime.   He also received tPA and dornase and tube was pulled on 7 /7 without any complications. He was readmitted 7/27 pm with acute SOB. DX:   * Aspiration pneumonia ([address])  Assessment & Plan  Patient with aspiration pneumonia, with large and of debris in the bronchus  N.p.o. for now  Cefepime day #1, Metronidazole day #1  Check procalcitonin, low threshold to discontinue  We will ask pulmonary to see, will defer on thoracentesis as patient has previous loculated effusion  Consider goals of care  Speech consultation for the morning     Empyema of lung ([address])  Assessment & Plan  Empyema of the lung requiring chest tube placement and 7-day course of cefepime. Also required tPA dornase  Pulmonary consultation placed to discuss possible thoracentesis versus chest tube placement given dense consolidation        Hyponatremia   Anemia  KARINA on CKD  Afib with slow ventricular response  Chronic CHF  Pericardial effusion  T2DM and HTN. Current Precautions: Aspiration    Allergies:  No known food allergies    Past medical history:  Please see H&P for details    Special Studies:  7/27 CT of chest:  New occlusion of the bronchus intermedius and the right middle and right lower lobe bronchi with new patchy consolidation in the dependent right upper lobe, worsening consolidation in the right middle lobe, and near complete consolidation of the right   lower lobe superimposed on rounded atelectasis, likely aspiration. Redemonstration of moderate loculated right hydropneumothorax and right pleural thickening. Increase in small left pleural effusion. Social/Education/Vocational Hx:  Pt recently hospitalized and transferred to the [address] [place] [address] for nursing/rehabilitative services upon discharge    Swallow Information   Current Risks for Dysphagia & Aspiration: ressiratory challenge, general debilitation.  lethargy  Current Diet: NPO   Baseline Diet: regular diet and thin liquids      Baseline Assessment   Behavior/Cognition: lethargicbut arousable  Speech/Language Status: Able to follow commands with occasional prompts needed, minimal verbal output but no significant dysarthria. Patient Positioning: upright in bed  Pain Status/Interventions/Response to Interventions:  No report of or nonverbal indications of pain. Swallow Mechanism Exam  Facial: symmetrical  Labial: WFL  Lingual: WFL  Velum: symmetrical with weak appearing risk  Mandible: adequate ROM  Dentition: adequate (missing some upper molars)  Vocal quality:clear but weak (mod reduced volume)   Volitional Cough: Relatively strong sounding but nonproductive  Respiratory Status: on RA with a baseline oxygen saturation of 97%    Consistencies Assessed and Performance   Materials administered included 6 tsps applesauce, 4 ozs honey/mod thick liquid, 4 ozs nectar/mildly thick liquid, to ice chips, teaspoon and 6 sips of water. Oral Stage:   Mastication of ice was weak appearing. Bolus formation and transfer were functional with no significant oral residue noted with puree or liquids. No overt s/s reduced oral control. Pharyngeal Stage:   Swallow Mechanics:  Swallowing initiation appeared prompt. Laryngeal rise was palpated and judged to be within functional limits. No immeidate coughing, throat clearing, change in vocal quality or respiratory status noted today with conservative materials. Moderately delayed cough x1 (eg 15-8 secs) after sip of water. Esophageal Concerns: none reported    Strategies and Efficacy: was positioned fully upright and fed slowly to maximize safety    Summary and Recommendations (see above)    Results Reviewed with: patient and RN     Treatment Recommended: yes     Patient Stated Goal: Did not state today. Dysphagia LTG  -Patient will demonstrate safe and effective oral intake (without overt s/s significant oral/pharyngeal dysphagia including s/s penetration or aspiration) for the highest appropriate diet level.      Short Term Goals:  -Pt will tolerate Dysphagia 1/pureed diet and nectar thick liquid with no significant s/s oral or pharyngeal dysphagia across 1-3 diagnostic session/s    -Patient will comply with a Video/Modified Barium Swallow study for more complete assessment of swallowing anatomy/physiology/aspiration risk and to assess efficacy of treatment techniques if/as indicated    -Patient will tolerate trials of upgraded food and/or liquid texture with no significant s/s of oral or pharyngeal dysphagia including aspiration across 1-3 diagnostic sessions     -Additional goals to follow VBS/MBS

## 2023-07-28 NOTE — CASE MANAGEMENT
Case Management Assessment & Discharge Planning Note    Patient name Altha Duane  Location 2 [place] 221/2 [place] [address] MRN [id number]  : 1940 Date 2023       Current Admission Date: 2023  Current Admission Diagnosis:Aspiration pneumonia [facility])   Patient Active Problem List    Diagnosis Date Noted   • Aspiration pneumonia ([address]) 2023   • Duodenal ulcer 07/10/2023   • Encephalopathy 2023   • Empyema of lung ([address]) 2023   • Hypoglycemia 2023   • Thrombocytopenia ([address]) 2023   • Diarrhea 2023   • Hypothermia 2023   • Pleural effusion, right side 2023   • Septic shock ([address]) 2023   • Urinary retention 2023   • Macrocytosis 2023   • Hyponatremia 2023   • Anemia 2023   • Chronic kidney disease-mineral and bone disorder 2023   • Advanced care planning/counseling discussion 2023   • Acute kidney injury superimposed on chronic kidney disease ([address]) 2022   • Benign hypertension with CKD (chronic kidney disease) stage IV ([facility]) 2022   • Persistent proteinuria 2022   • COVID-19 2022   • Electrolyte abnormality 2022   • Cellulitis of left upper extremity 2021   • Atrial fibrillation with slow ventricular response ([address]) 2021   • Vitamin D deficiency 10/18/2019   • Chronic combined systolic and diastolic congestive heart failure ([address]) 2019   • Pericardial effusion 2019   • Leg edema 01/10/2019   • 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 ([address]) 01/10/2019   • PVC (premature ventricular contraction) 2018   • Essential hypertension 2018   • Closed traumatic displaced fracture of distal end of left radius with malunion 2018      LOS (days): 1  Geometric Mean LOS (GMLOS) (days): 3.60  Days to GMLOS:2.6     OBJECTIVE:  PATIENT READMITTED TO HOSPITAL  Risk of Unplanned Readmission Score: 33.96         Current admission status: Inpatient       Preferred Pharmacy:   [facility] [address], [address] [facility] [address] [place] [address] [address]  [address] [facility] [address] [facility] [address] [address]  Phone: [phone] Fax: [phone]    Primary Care Provider: Beti Davies DO    Primary Insurance: MEDICARE  Secondary Insurance: BLUE CROSS    ASSESSMENT:  Joe Proxies    There are no active Health Care Proxies on file.        Readmission Root Cause  30 Day Readmission: No    Patient Information  Admitted from[de-identified] Home  Mental Status: Alert  During Assessment patient was accompanied by: Not accompanied during assessment  Assessment information provided by[de-identified] Patient  Primary Caregiver: Family  Caregiver's Name[de-identified] Son Rashad Milan Relationship to Patient[de-identified] Family Member  Caregiver's Telephone Number[de-identified] [phone]  Support Systems: Son, Family members  [place] of Residence: [address] [place] [address] do you live in?: [place]  Type of Current Residence: Facility (Patient lives at home with son but currently at [facility] for rehab.)  Upon entering residence, is there a bedroom on the main floor (no further steps)?: Yes  Upon entering residence, is there a bathroom on the main floor (no further steps)?: Yes  In the last 12 months, was there a time when you were not able to pay the mortgage or rent on time?: No  In the last 12 months, how many places have you lived?: 1  In the last 12 months, was there a time when you did not have a steady place to sleep or slept in a shelter (including now)?: No  Homeless/housing insecurity resource given?: N/A  Living Arrangements: Other (Comment) (Son lives with patient, currently patient is at [facility] for rehab.)    Activities of Daily Living Prior to Admission  Functional Status: Assistance  Completes ADLs independently?: No  Level of ADL dependence: Assistance  Ambulates independently?: No  Level of ambulatory dependence: Assistance  Does patient use assisted devices?: Yes  Assisted Devices (DME) used: [employer]  Does patient currently own DME?: Yes  What DME does the patient currently own?: [employer]  Does patient have a history of Outpatient Therapy (PT/OT)?: No  Does the patient have a history of Short-Term Rehab?: No  Does patient have a history of HHC?: Yes  Does patient currently have [address]?: No         Patient Information Continued  Income Source: Pension/penitentiary  Does patient have prescription coverage?: Yes  Within the past 12 months, you worried that your food would run out before you got the money to buy more.: Never true  Within the past 12 months, the food you bought just didn't last and you didn't have money to get more.: Never true  Food insecurity resource given?: N/A  Does patient receive dialysis treatments?: No         Means of Transportation  Means of Transport to Appts[de-identified] Family transport  In the past 12 months, has lack of transportation kept you from medical appointments or from getting medications?: No  In the past 12 months, has lack of transportation kept you from meetings, work, or from getting things needed for daily living?: No  Was application for public transport provided?: N/A        DISCHARGE DETAILS:    Discharge planning discussed with[de-identified] Panchito Lou  CM contacted family/caregiver?: Yes    Contacts  Patient Contacts: Jen Lewis  Relationship to Patient[de-identified] Family  Contact Method: Phone  Phone Number: [phone]  Reason/Outcome: Continuity of Care, Discharge Planning, Emergency Contact    Requested [address] [facility]         Is the patient interested in [address] at discharge?: No    DME Referral Provided  Referral made for DME?: No       CM called and spoke with panchito Lou in length about patient and his preference to have him return home. Ideally they would prefer patient to return home with [address] for PT/OT. Patient has strong support at home from son and other family. Patients current needs TBD. CM to follow patient.      CM to provide panchito Lou with more information on medicaid and whether or not he can get compensated for being his father's caregiver.

## 2023-07-28 NOTE — CONSULTS
Consultation - Pulmonary Medicine   Scottie Goode 78 y.o. male MRN: [id number]  Unit/Bed#: 2 [place] [address] Encounter: [id number]      Assessment/Plan:    Abnormal chest imaging with occlusion of the bronchus intermedius and RML/RLL bronchi with patchy consolidation on the right with moderate loculated right hydropneumothorax   He had recent pneumonia with right sided empyema and is status post chest tube placement, tPA/dornase administration, and subsequent removal on July 7, 2023. Completed 7-day course of cefepime at that time. Procalcitonin has been negative x2 here. He continues on cefepime and Flagyl here. Can likely discontinue antibiotics. Consult is placed for thoracentesis versus chest tube with IR. Will discuss with Dr. Latanya Morrison. Add vest therapy and Xopenex nebulized every 6 hours for pulmonary hygiene. Concern for aspiration   Speech consult is pending. Recent pericardial effusion   Status post pericardiocentesis/drain placement on June 30, 2023 with negative bacterial cultures. Drain removed July 5, 2023. Chronic combined systolic and diastolic CHF   Management per primary team.    Discussed with primary team and speech therapy. History of Present Illness   Physician Requesting Consult: [place] Edouard MD  Reason for Consult / Principal Problem: Pneumonia, empyema  Hx and PE limited by: None  Chief Complaint: "I do not know."  HPI: Scottie Goode is a 78 y.o. male who presented to [address] [place] [address] after abnormal imaging and hypoxia per report. Gladis Jimenez is a poor historian and is currently resting in bed. He reports he does not feel short of breath. He does have a loose cough that is weak. He reports he has had no appetite and has not been eating or drinking. He has no other specific complaints.   He was hospitalized recently with pericardial effusion and empyema and he is status post course of antibiotics, chest tube with tPA dornase and subsequent removal, as well as pericardial drain placement and subsequent removal.  He is residing at a nursing facility. Inpatient consult to Pulmonology  Consult performed by: Corbin Friday, CRNP  Consult ordered by: Red Lofton DO        Review of Systems   All other systems reviewed and are negative. A full 12-point review of systems was completed and is negative except for those outlined in the HPI. Historical Information   Past Medical History:   Diagnosis Date   • Atrial fibrillation [facility])    • Chronic kidney disease    • Coronary artery disease    • Diabetes mellitus ([address] [facility])    • Hyperlipidemia    • Hypertension      Past Surgical History:   Procedure Laterality Date   • CARDIAC CATHETERIZATION N/A 6/30/2023    Procedure: Cardiac pericardiocentesis; Surgeon: Mickey Felty, DO;  Location: [facility] CARDIAC [facility]ATH LAB; Service: Cardiology   • CARDIAC CATHETERIZATION N/A 6/30/2023    Procedure: Cardiac RHC; Surgeon: Mickey Felty, DO;  Location: [facility] C[facility];   Service: Cardiology   • EYE SURGERY     • IR CHEST TUBE PLACEMENT  6/24/2023   • SKIN CANCER EXCISION     • TONSILLECTOMY       Social History   Social History     Substance and Sexual Activity   Alcohol Use Not Currently   • Alcohol/week: 0.0 standard drinks of alcohol    Comment: special occasions     Social History     Substance and Sexual Activity   Drug Use Not Currently     Social History     Tobacco Use   Smoking Status Former   Smokeless Tobacco Former     E-Cigarette/Vaping   • E-Cigarette Use Never User      E-Cigarette/Vaping Substances   • Nicotine No    • THC No    • CBD No    • Flavoring No    • Other No    • Unknown No      Family History:   Family History   Problem Relation Age of Onset   • Heart disease Mother    • Diabetes Father    • Vision loss Father    • Cancer Sister    • Diabetes Sister        Meds/Allergies   all current active meds have been reviewed, pertinent pulmonary meds have been reviewed, current meds:   Current Facility-Administered Medications   Medication Dose Route Frequency   • acetaminophen (TYLENOL) oral suspension 650 mg  650 mg Oral Q4H PRN   • allopurinol (ZYLOPRIM) tablet 100 mg  100 mg Oral BID   • apixaban (ELIQUIS) tablet 2.5 mg  2.5 mg Oral BID   • atorvastatin (LIPITOR) tablet 40 mg  40 mg Oral Daily With Dinner   • cefepime (MAXIPIME) IVPB (premix in dextrose) 1,000 mg 50 mL  1,000 mg Intravenous Q12H   • metroNIDAZOLE (FLAGYL) IVPB (premix) 500 mg 100 mL  500 mg Intravenous Q8H   • ondansetron (ZOFRAN) injection 4 mg  4 mg Intravenous Q6H PRN   • tamsulosin (FLOMAX) capsule 0.4 mg  0.4 mg Oral Daily With Dinner   • timolol (TIMOPTIC) 0.5 % ophthalmic solution 1 drop  1 drop Both Eyes Daily    and PTA meds:   Prior to Admission Medications   Prescriptions Last Dose Informant Patient Reported? Taking? ALPRAZolam (XANAX) 0.5 mg tablet 2023 at 2100 Child Yes Yes   Si.5 mg daily at bedtime    Blood Pressure Monitor NILTON Unknown Child No No   Sig: by Does not apply route daily   Eliquis 2.5 MG 2023 at 0900  No Yes   Sig: TAKE ONE TABLET BY MOUTH TWICE A DAY   ZINC OXIDE PO Unknown Child Yes No   Sig: Take by mouth daily   allopurinol (ZYLOPRIM) 100 mg tablet 2023 at 0800 Child Yes Yes   Sig: Take 100 mg by mouth 2 (two) times a day   ascorbic acid (VITAMIN C) 500 MG tablet 2023 at 0900 Child No Yes   Sig: Take 1 tablet (500 mg total) by mouth daily   atorvastatin (LIPITOR) 40 mg tablet 2023 Child No Yes   Sig: Take 1 tablet (40 mg total) by mouth daily with dinner   cholecalciferol (VITAMIN D3) 1,000 units tablet 2023 at 0900  No Yes   Sig: Take 2 tablets (2,000 Units total) by mouth daily Do not start before 2023.    glimepiride (AMARYL) 2 mg tablet 2023 at 1700 Child No Yes   Sig: Take 1 tablet (2 mg total) by mouth daily with dinner   glimepiride (AMARYL) 4 mg tablet 2023 at 0900 Child No Yes   Sig: Take 1 tablet (4 mg total) by mouth daily with breakfast latanoprost (XALATAN) 0.005 % ophthalmic solution 2023 at 2100 Child Yes Yes   Si drop daily at bedtime   pantoprazole (PROTONIX) 40 mg tablet 2023 at 0900  No Yes   Sig: Take 1 tablet (40 mg total) by mouth daily   sitaGLIPtin (JANUVIA) 25 mg tablet 2023 at 0900 Child No Yes   Sig: Take 1 tablet (25 mg total) by mouth daily Do not start before 2023. tamsulosin (FLOMAX) 0.4 mg 2023 at 1700 Child Yes Yes   Sig: Take 0.4 mg by mouth daily with dinner   timolol (TIMOPTIC) 0.5 % ophthalmic solution 2023 at 0900 Child No Yes   Sig: Administer 1 drop to both eyes daily   torsemide (DEMADEX) 20 mg tablet   Yes Yes   Sig: Take 20 mg by mouth daily   torsemide 40 MG TABS 2023 at 0800  No Yes   Sig: Take 40 mg by mouth daily Do not start before 2023. Facility-Administered Medications: None       Allergies   Allergen Reactions   • Elemental Sulfur    • Sulfa Antibiotics        Objective   Vitals: Blood pressure 138/65, pulse 100, temperature 97.9 °F (36.6 °C), resp. rate 16, height 5' 9" (1.753 m), weight 68.1 kg (150 lb 1.6 oz), SpO2 95 %. 2 L nasal cannula,Body mass index is 22.17 kg/m². Intake/Output Summary (Last 24 hours) at 2023 0909  Last data filed at 2023 0650  Gross per 24 hour   Intake 1366.67 ml   Output --   Net 1366.67 ml     Invasive Devices     Peripheral Intravenous Line  Duration           Peripheral IV 23 Left Antecubital <1 day          Drain  Duration           External Urinary Catheter Small 18 days                Physical Exam  Vitals reviewed. Constitutional:       General: He is not in acute distress. Appearance: He is well-developed. He is not toxic-appearing or diaphoretic. HENT:      Head: Normocephalic and atraumatic. Eyes:      General: No scleral icterus. Neck:      Trachea: No tracheal deviation. Cardiovascular:      Rate and Rhythm: Normal rate and regular rhythm.       Heart sounds: S1 normal and S2 normal. No murmur heard. No friction rub. No gallop. Pulmonary:      Effort: Pulmonary effort is normal. No tachypnea, accessory muscle usage or respiratory distress. Breath sounds: No stridor. Rhonchi present. No decreased breath sounds, wheezing or rales. Chest:      Chest wall: No tenderness. Abdominal:      General: Bowel sounds are normal. There is no distension. Palpations: Abdomen is soft. Tenderness: There is no abdominal tenderness. Musculoskeletal:         General: No tenderness. Cervical back: Neck supple. Right lower leg: Edema present. Left lower leg: Edema present. Skin:     General: Skin is warm and dry. Findings: No rash. Neurological:      Mental Status: He is alert. GCS: GCS eye subscore is 4. GCS verbal subscore is 5. GCS motor subscore is 6. Psychiatric:         Speech: Speech normal.         Behavior: Behavior is cooperative. Lab Results:   CBC:   Lab Results   Component Value Date    WBC 4.14 (L) 07/28/2023    HGB 8.6 (L) 07/28/2023    HCT 26.1 (L) 07/28/2023     (H) 07/28/2023     (L) 07/28/2023    RBC 2.57 (L) 07/28/2023    MCH 33.5 07/28/2023    MCHC 33.0 07/28/2023    RDW 14.7 07/28/2023    MPV 9.8 07/28/2023    NRBC 0 07/28/2023   , CMP:   Lab Results   Component Value Date    SODIUM 136 07/28/2023    K 3.8 07/28/2023     07/28/2023    CO2 29 07/28/2023    BUN 36 (H) 07/28/2023    CREATININE 2.59 (H) 07/28/2023    CALCIUM 8.2 (L) 07/28/2023    AST 20 07/27/2023    ALT 21 07/27/2023    ALKPHOS 134 (H) 07/27/2023    EGFR 21 07/28/2023     Procalcitonin: 0.19, 0.20    Culture Data: Blood cultures x2 pending    Imaging Studies: I have personally reviewed pertinent reports.    and I have personally reviewed pertinent films in PACS   CT chest done yesterday shows new occlusion of the bronchus intermedius and the right middle lobe and right lower lobe bronchi with new patchy consolidation in the right upper and middle lobe with near complete consolidation of the right lower lobe. There is a moderate loculated right hydropneumothorax and right pleural thickening. There is also increase in a small left pleural effusion. EKG, Pathology, and Other Studies: I have personally reviewed pertinent reports. None new    Pulmonary Results (PFTs, PSG): I have personally reviewed pertinent reports. None    VTE Prophylaxis: Sequential compression device (Venodyne)  and RX contraindicated due to: On Eliquis    Code Status: Level 3 - DNAR and DNI    None    Portions of the record may have been created with voice recognition software. Occasional wrong word or "sound a like" substitutions may have occurred due to the inherent limitations of voice recognition software. Read the chart carefully and recognize, using context, where substitutions have occurred.

## 2023-07-28 NOTE — ASSESSMENT & PLAN NOTE
Wt Readings from Last 3 Encounters:   07/27/23 68.1 kg (150 lb 1.6 oz)   07/10/23 64.9 kg (143 lb 1.3 oz)   06/28/23 74.5 kg (164 lb 3.9 oz)     No evidence of exacerbation, appears below historical dry weight of around 164 pounds.   Given poor intake, continue to hold diuretic

## 2023-07-28 NOTE — ASSESSMENT & PLAN NOTE
· Empyema of the lung requiring s/p chest tube placement on 6/24 and removal on 7/7. Received tPA/dornase administration and 7-day course of cefepime during previous admission.   · Chest tube placed again on 7/28  by IR due to persistent right hydropneumothorax

## 2023-07-28 NOTE — DISCHARGE INSTRUCTIONS
Thoracentesis   WHAT YOU NEED TO KNOW:   A thoracentesis is a procedure to remove extra fluid or air from between your lungs and your inner chest wall. Air or fluid buildup may make it hard for you to breathe. A thoracentesis allows your lungs to expand fully so you can breathe more easily. DISCHARGE INSTRUCTIONS:     Small amount of shoulder pain and bloody sputum is normal after a Thoracentesis. Rest:  Rest when you feel it is needed. Slowly start to do more each day. Return to your daily activities as directed. Resume your normal diet. Small sips of flat soda will help mild nausea. Do not smoke: If you smoke, it is never too late to quit. Ask for information about how to stop smoking if you need help. Contact Interventional Radiology at [phone] [place] PATIENTS: Contact Interventional Radiology at [phone]) Marcy Melendez PATIENTS: Contact Interventional Radiology at [phone]) if:   You have a fever. Your puncture site is red, warm, swollen, or draining pus. You have questions or concerns about your procedure, medicine, or care. Seek care immediately or call 911 if:   Severe chest pain with inspiration and shortness of breath    Large amounts of blood in your sputum    Follow up with your healthcare provider as directed.

## 2023-07-28 NOTE — ASSESSMENT & PLAN NOTE
Discussed with patient's son regarding goals of care. Discussed the possibility of comfort measure given FTT and frequent admissions. Pt's son would like to continue current treatment. Pt had expressed to his son that he doess not wish to go back to rehab facilities. Pt's son will discuss with the pt.  Pt is level 3 DNR/DNI

## 2023-07-28 NOTE — PROGRESS NOTES
[facility]  Progress Note  Name: Javier Méndez  MRN: [id number]  Unit/Bed#: 2 [address] Date of Admission: 7/27/2023   Date of Service: 7/28/2023 I Hospital Day: 1    Assessment/Plan   * Aspiration pneumonia [facility])  Assessment & Plan  Patient with aspiration pneumonia, with large and of debris in the bronchus  IV Cefepime and IV Flagyl   Check procalcitonin, low threshold to discontinue  Dysphagia diet per speech/input appreciated   Lactic acidosis due to increased work of breathing, normalized after IVF      Recent empyema of lung with persistent locuted R hydropneumothorax  Assessment & Plan  · Empyema of the lung requiring s/p chest tube placement on 6/24 and removal on 7/7. Received tPA/dornase administration and 7-day course of cefepime during previous admission. · Chest tube placed again on 7/28  by IR due to persistent right hydropneumothorax      Chronic combined systolic and diastolic congestive heart failure (HCC)  Assessment & Plan  Wt Readings from Last 3 Encounters:   07/27/23 68.1 kg (150 lb 1.6 oz)   07/10/23 64.9 kg (143 lb 1.3 oz)   06/28/23 74.5 kg (164 lb 3.9 oz)     No evidence of exacerbation, appears below historical dry weight of around 164 pounds. Given poor intake, continue to hold diuretic        Recent pericardial effusion  Assessment & Plan  Patient received pericardiocentesis on 6/30/2023 for tamponade, recovered. Atrial fibrillation with slow ventricular response (HCC)  Assessment & Plan  Ventricle rate controlled  Continue Eliquis    Goals of care, counseling/discussion  Assessment & Plan  Discussed with patient's son regarding goals of care. Discussed the possibility of comfort measure given FTT and frequent admissions. Pt's son would like to continue current treatment. Pt had expressed to his son that he doess not wish to go back to rehab facilities. Pt's son will discuss with the pt.  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 Blood Sugar Average: Last 72 hrs:  Home regimen reviewed. Hold Metformin if applicable. Start SSI and Basal bolus protocol       VTE Pharmacologic Prophylaxis:   Pharmacologic: Apixaban (Eliquis)    Patient Centered Rounds:     Discussions with Specialists or Other Care Team Provider:     Education and Discussions with Family / Patient: Spoke with patient's son at length    Current Length of Stay: 1 day(s)    Current Patient Status: Inpatient   Certification Statement: The patient will continue to require additional inpatient hospital stay due to above    Discharge Plan: > 48 hours    Code Status: Level 3 - DNAR and DNI      Subjective:   Patient was seen this afternoon after chest tube placement. Complaining of pain around chest tube site    Objective:     Vitals:   Temp (24hrs), Av.6 °F (36.4 °C), Min:97.3 °F (36.3 °C), Max:97.9 °F (36.6 °C)    Temp:  [97.3 °F (36.3 °C)-97.9 °F (36.6 °C)] 97.3 °F (36.3 °C)  HR:  [] 98  Resp:  [16-22] 18  BP: ()/(57-89) 108/65  SpO2:  [89 %-100 %] 89 %  Body mass index is 22.17 kg/m². Input and Output Summary (last 24 hours): Intake/Output Summary (Last 24 hours) at 2023 1739  Last data filed at 2023 1626  Gross per 24 hour   Intake 1316.67 ml   Output 150 ml   Net 1166.67 ml       Physical Exam:     Physical Exam  Constitutional:       General: He is not in acute distress. Appearance: He is ill-appearing. He is not toxic-appearing or diaphoretic. Eyes:      General:         Right eye: No discharge. Left eye: No discharge. Cardiovascular:      Rate and Rhythm: Normal rate. Pulmonary:      Effort: Pulmonary effort is normal. No respiratory distress. Abdominal:      Palpations: Abdomen is soft. Skin:     General: Skin is warm. Neurological:      Mental Status: Mental status is at baseline. Additional Data:     Labs:    Results from last 7 days   Lab Units 07/28/23  0528   WBC Thousand/uL 4.14*   HEMOGLOBIN g/dL 8.6*   HEMATOCRIT % 26.1*   PLATELETS Thousands/uL 113*   NEUTROS PCT % 71   LYMPHS PCT % 17   MONOS PCT % 10   EOS PCT % 1     Results from last 7 days   Lab Units 07/28/23  0528 07/27/23  1648   POTASSIUM mmol/L 3.8 4.7   CHLORIDE mmol/L 101 96   CO2 mmol/L 29 31   BUN mg/dL 36* 36*   CREATININE mg/dL 2.59* 2.80*   CALCIUM mg/dL 8.2* 8.5   ALK PHOS U/L  --  134*   ALT U/L  --  21   AST U/L  --  20     Results from last 7 days   Lab Units 07/27/23  1648   INR  1.44*         Recent Cultures (last 7 days):     Results from last 7 days   Lab Units 07/27/23  1659 07/27/23  1654   BLOOD CULTURE  Received in Microbiology Lab. Culture in Progress. Received in Microbiology Lab. Culture in Progress. Last 24 Hours Medication List:   Current Facility-Administered Medications   Medication Dose Route Frequency Provider Last Rate   • acetaminophen  975 mg Oral Q8H Seble Qiu MD     • allopurinol  100 mg Oral BID Edmar Aguilar DO     • apixaban  2.5 mg Oral BID Edmar Aguilar, DO     • atorvastatin  40 mg Oral Daily With Dinner Edmar Aguilar DO     • cefepime  1,000 mg Intravenous Q12H Edmar Aguilar DO Stopped (07/28/23 1000)   • levalbuterol  1.25 mg Nebulization Q6H Melonie Signs, CRNP     • metroNIDAZOLE  500 mg Intravenous Q8H Edmar Aguilar,  mg (07/28/23 1429)   • ondansetron  4 mg Intravenous Q6H PRN Edmar Aguilar DO     • tamsulosin  0.4 mg Oral Daily With Dinner Kenney Ramires DO     • timolol  1 drop Both Eyes Daily Kenney Ramires DO          Today, Patient Was Seen By: Seble Qiu MD    ** Please Note: Dictation voice to text software may have been used in the creation of this document.  **

## 2023-07-28 NOTE — PHYSICAL THERAPY NOTE
PT EVALUATION     07/28/23 1500   PT Last Visit   PT Visit Date 07/28/23   Note Type   Note type Evaluation   Pain Assessment   Pain Assessment Tool 0-10   Pain Score No Pain   Restrictions/Precautions   Weight Bearing Precautions Per Order No   Other Precautions Chair Alarm; Bed Alarm; Fall Risk   Home Living   Type of [address] [place] [address] Two level; Able to live on main level with bedroom/bathroom;Stairs to enter with rails  (5 M[address])   Bathroom Shower/Tub Tub/shower unit   [employer] bars in [address]   Prior Function   Level of [address] with ADLs; Independent with functional mobility; Needs assistance with IADLS   Lives With Son  (and sister in law)   Ba Pardeep Help From Family   IADLs Family/Friend/Other provides transportation; Independent with meal prep; Independent with medication management   Vocational Retired   General   Additional Pertinent History Pt is an 78year old male admitted with SOB from NH where pt was for rehab. Family/Caregiver Present No   Cognition   Overall Cognitive Status WFL   Arousal/Participation Cooperative   Orientation Level Oriented to person   Following Commands Follows one step commands with increased time or repetition   Subjective   Subjective Pt reports that he is exhausted, unable to complete exercises. RLE Assessment   RLE Assessment WFL  (stength grossly: 3- to 3/5)   LLE Assessment   LLE Assessment WFL  (stength grossly: 3- to 3/5)   Bed Mobility   Supine to Sit Unable to assess   Transfers   Sit to Stand Unable to assess   Ambulation/Elevation   Gait Assistance Not tested   Activity Tolerance   Activity Tolerance Patient limited by fatigue   Nurse Made Aware yes; Emeka Cooper   Assessment   Prognosis Good   Problem List Decreased strength;Decreased endurance; Impaired balance;Decreased mobility; Decreased coordination;Decreased safety awareness   Assessment Patient seen for Physical Therapy evaluation.  Patient admitted with Aspiration pneumonia ([address] Comorbidities affecting patient's physical performance include: DM, CHF, HTN, Afib, pericardiocentesis 6/30/23, emphysema. Personal factors affecting patient at time of initial evaluation include: lives in two story house, ambulating with assistive device, stairs to enter home, inability to ambulate household distances, inability to navigate community distances, inability to navigate level surfaces without external assistance, decreased initiation and engagement, inability to perform physical activity, limited insight into impairments, inability to perform ADLS and inability to perform IADLS . Prior to admission, patient was requiring assist for functional mobility with Rolling walker, requiring assist for ADLS, requiring assist for IADLS and in short term rehab. Please find objective findings from Physical Therapy assessment regarding body systems outlined above with impairments and limitations including weakness, impaired balance, decreased endurance, decreased activity tolerance, decreased functional mobility tolerance, decreased safety awareness, fall risk and SOB upon exertion. The Barthel Index was used as a functional outcome tool presenting with a score of Barthel Index Score: 20 today indicating marked limitations of functional mobility and ADLS. Patient's clinical presentation is currently unstable/unpredictable as seen in patient's presentation of vital sign response, increased fall risk, new onset of impairment of functional mobility, decreased endurance and new onset of weakness. Pt would benefit from continued Physical Therapy treatment to address deficits as defined above and maximize level of functional mobility. As demonstrated by objective findings, the assigned level of complexity for this evaluation is high. The patient's [facility] Basic Mobility Inpatient Short Form Raw Score is 6.  A Raw score of less than or equal to 16 suggests the patient may benefit from discharge to post-acute rehabilitation services. Please also refer to the recommendation of the Physical Therapist for safe discharge planning. Goals   Patient Goals none stated except too exhausted to move around today   STG Expiration Date 08/04/23   Short Term Goal #1 Indep transfers supine <> short sit ; Min A/Supervision for transfers sit <> stand with use of RW   Short Term Goal #2 Min A / supervision for amb. with RW for 50 feet with changes in direction. LTG Expiration Date 08/11/23   Long Term Goal #1 Indep amb. with Rw for functional household distances. Long Term Goal #2 Indep with HEP   Plan   Treatment/Interventions Functional transfer training;LE strengthening/ROM; Therapeutic exercise; Endurance training;Patient/family training;Equipment eval/education; Bed mobility;Gait training;Spoke to nursing   PT Frequency Other (Comment)  (5/w)   Recommendation   PT Discharge Recommendation Post acute rehabilitation services   Additional [facility] [address] [place] [address] (rehab?)   AM-PAC Basic Mobility Inpatient   Turning in Flat Bed Without Bedrails 1   Lying on Back to Sitting on Edge of Flat Bed Without Bedrails 1   Moving Bed to Chair 1   Standing Up From Chair Using Arms 1   Walk in Room 1   Climb 3-5 Stairs With Railing 1   Basic Mobility Inpatient Raw Score 6   Turning Head Towards Sound 3   Follow Simple Instructions 1   Low Function Basic Mobility Raw Score  10   Low Function Basic Mobility Standardized Score  14.65   Highest Level Of Mobility   JH-HLM Goal 2: Bed activities/Dependent transfer   JH-HLM Achieved 2: Bed activities/Dependent transfer   Barthel Index   Feeding 5   Bathing 0   Grooming Score 0   Dressing Score 0   Bladder Score 0   Bowels Score 10   Toilet Use Score 0   Transfers (Bed/Chair) Score 5   Mobility (Level Surface) Score 0   Stairs Score 0   Barthel Index Score 20   End of Consult   Patient Position at End of Consult Supine;Bed/Chair alarm activated; All needs within reach Licensure   [place] License Number  Swapna. Morehouse Collar PT  [id number]

## 2023-07-29 PROBLEM — A41.9 SEPSIS (HCC): Status: ACTIVE | Noted: 2023-07-29

## 2023-07-29 LAB
ANION GAP SERPL CALCULATED.3IONS-SCNC: 5 MMOL/L
BASOPHILS # BLD AUTO: 0.02 THOUSANDS/ÂΜL (ref 0–0.1)
BASOPHILS NFR BLD AUTO: 1 % (ref 0–1)
BUN SERPL-MCNC: 34 MG/DL (ref 5–25)
CALCIUM SERPL-MCNC: 8 MG/DL (ref 8.4–10.2)
CHLORIDE SERPL-SCNC: 103 MMOL/L (ref 96–108)
CO2 SERPL-SCNC: 30 MMOL/L (ref 21–32)
CREAT SERPL-MCNC: 2.68 MG/DL (ref 0.6–1.3)
EOSINOPHIL # BLD AUTO: 0.06 THOUSAND/ÂΜL (ref 0–0.61)
EOSINOPHIL NFR BLD AUTO: 2 % (ref 0–6)
ERYTHROCYTE [DISTWIDTH] IN BLOOD BY AUTOMATED COUNT: 15 % (ref 11.6–15.1)
GFR SERPL CREATININE-BSD FRML MDRD: 20 ML/MIN/1.73SQ M
GLUCOSE SERPL-MCNC: 92 MG/DL (ref 65–140)
HCT VFR BLD AUTO: 27.4 % (ref 36.5–49.3)
HGB BLD-MCNC: 8.7 G/DL (ref 12–17)
IMM GRANULOCYTES # BLD AUTO: 0.02 THOUSAND/UL (ref 0–0.2)
IMM GRANULOCYTES NFR BLD AUTO: 1 % (ref 0–2)
LYMPHOCYTES # BLD AUTO: 0.91 THOUSANDS/ÂΜL (ref 0.6–4.47)
LYMPHOCYTES NFR BLD AUTO: 25 % (ref 14–44)
MCH RBC QN AUTO: 32.5 PG (ref 26.8–34.3)
MCHC RBC AUTO-ENTMCNC: 31.8 G/DL (ref 31.4–37.4)
MCV RBC AUTO: 102 FL (ref 82–98)
MONOCYTES # BLD AUTO: 0.47 THOUSAND/ÂΜL (ref 0.17–1.22)
MONOCYTES NFR BLD AUTO: 13 % (ref 4–12)
NEUTROPHILS # BLD AUTO: 2.18 THOUSANDS/ÂΜL (ref 1.85–7.62)
NEUTS SEG NFR BLD AUTO: 58 % (ref 43–75)
NRBC BLD AUTO-RTO: 0 /100 WBCS
PLATELET # BLD AUTO: 125 THOUSANDS/UL (ref 149–390)
PMV BLD AUTO: 9.4 FL (ref 8.9–12.7)
POTASSIUM SERPL-SCNC: 3.8 MMOL/L (ref 3.5–5.3)
RBC # BLD AUTO: 2.68 MILLION/UL (ref 3.88–5.62)
SODIUM SERPL-SCNC: 138 MMOL/L (ref 135–147)
WBC # BLD AUTO: 3.66 THOUSAND/UL (ref 4.31–10.16)

## 2023-07-29 PROCEDURE — 97530 THERAPEUTIC ACTIVITIES: CPT

## 2023-07-29 PROCEDURE — 94760 N-INVAS EAR/PLS OXIMETRY 1: CPT

## 2023-07-29 PROCEDURE — 97167 OT EVAL HIGH COMPLEX 60 MIN: CPT

## 2023-07-29 PROCEDURE — 80048 BASIC METABOLIC PNL TOTAL CA: CPT | Performed by: INTERNAL MEDICINE

## 2023-07-29 PROCEDURE — 99232 SBSQ HOSP IP/OBS MODERATE 35: CPT | Performed by: INTERNAL MEDICINE

## 2023-07-29 PROCEDURE — 85025 COMPLETE CBC W/AUTO DIFF WBC: CPT | Performed by: INTERNAL MEDICINE

## 2023-07-29 PROCEDURE — 94640 AIRWAY INHALATION TREATMENT: CPT

## 2023-07-29 RX ORDER — HYDROMORPHONE HCL IN WATER/PF 6 MG/30 ML
0.2 PATIENT CONTROLLED ANALGESIA SYRINGE INTRAVENOUS EVERY 4 HOURS PRN
Status: DISCONTINUED | OUTPATIENT
Start: 2023-07-29 | End: 2023-08-10 | Stop reason: HOSPADM

## 2023-07-29 RX ORDER — HYDROMORPHONE HCL IN WATER/PF 6 MG/30 ML
0.2 PATIENT CONTROLLED ANALGESIA SYRINGE INTRAVENOUS EVERY 4 HOURS PRN
Status: DISCONTINUED | OUTPATIENT
Start: 2023-07-29 | End: 2023-07-29

## 2023-07-29 RX ORDER — DOCUSATE SODIUM 100 MG/1
100 CAPSULE, LIQUID FILLED ORAL 2 TIMES DAILY PRN
Status: DISCONTINUED | OUTPATIENT
Start: 2023-07-29 | End: 2023-08-10 | Stop reason: HOSPADM

## 2023-07-29 RX ADMIN — LEVALBUTEROL HYDROCHLORIDE 1.25 MG: 1.25 SOLUTION RESPIRATORY (INHALATION) at 14:22

## 2023-07-29 RX ADMIN — LEVALBUTEROL HYDROCHLORIDE 1.25 MG: 1.25 SOLUTION RESPIRATORY (INHALATION) at 02:22

## 2023-07-29 RX ADMIN — LEVALBUTEROL HYDROCHLORIDE 1.25 MG: 1.25 SOLUTION RESPIRATORY (INHALATION) at 07:21

## 2023-07-29 RX ADMIN — ALLOPURINOL 100 MG: 100 TABLET ORAL at 16:45

## 2023-07-29 RX ADMIN — METRONIDAZOLE 500 MG: 500 INJECTION, SOLUTION INTRAVENOUS at 14:09

## 2023-07-29 RX ADMIN — METRONIDAZOLE 500 MG: 500 INJECTION, SOLUTION INTRAVENOUS at 21:26

## 2023-07-29 RX ADMIN — LEVALBUTEROL HYDROCHLORIDE 1.25 MG: 1.25 SOLUTION RESPIRATORY (INHALATION) at 18:26

## 2023-07-29 RX ADMIN — APIXABAN 2.5 MG: 2.5 TABLET, FILM COATED ORAL at 08:33

## 2023-07-29 RX ADMIN — ACETAMINOPHEN 975 MG: 650 SUSPENSION ORAL at 08:32

## 2023-07-29 RX ADMIN — ACETAMINOPHEN 975 MG: 650 SUSPENSION ORAL at 16:44

## 2023-07-29 RX ADMIN — DORNASE ALFA 5 MG: 1 SOLUTION RESPIRATORY (INHALATION) at 11:23

## 2023-07-29 RX ADMIN — HYDROMORPHONE HYDROCHLORIDE 0.2 MG: 0.2 INJECTION, SOLUTION INTRAMUSCULAR; INTRAVENOUS; SUBCUTANEOUS at 11:33

## 2023-07-29 RX ADMIN — APIXABAN 2.5 MG: 2.5 TABLET, FILM COATED ORAL at 16:46

## 2023-07-29 RX ADMIN — ACETAMINOPHEN 975 MG: 650 SUSPENSION ORAL at 02:16

## 2023-07-29 RX ADMIN — CEFEPIME HYDROCHLORIDE 1000 MG: 1 INJECTION, SOLUTION INTRAVENOUS at 21:26

## 2023-07-29 RX ADMIN — TIMOLOL MALEATE 1 DROP: 5 SOLUTION/ DROPS OPHTHALMIC at 08:34

## 2023-07-29 RX ADMIN — TAMSULOSIN HYDROCHLORIDE 0.4 MG: 0.4 CAPSULE ORAL at 16:45

## 2023-07-29 RX ADMIN — ALLOPURINOL 100 MG: 100 TABLET ORAL at 08:33

## 2023-07-29 RX ADMIN — ALTEPLASE 10 MG: 2.2 INJECTION, POWDER, LYOPHILIZED, FOR SOLUTION INTRAVENOUS at 11:24

## 2023-07-29 RX ADMIN — CEFEPIME HYDROCHLORIDE 1000 MG: 1 INJECTION, SOLUTION INTRAVENOUS at 08:48

## 2023-07-29 RX ADMIN — ATORVASTATIN CALCIUM 40 MG: 40 TABLET, FILM COATED ORAL at 16:44

## 2023-07-29 RX ADMIN — METRONIDAZOLE 500 MG: 500 INJECTION, SOLUTION INTRAVENOUS at 06:24

## 2023-07-29 NOTE — ASSESSMENT & PLAN NOTE
Discussed with patient's son on 7/28 regarding goals of care. Discussed the possibility of comfort measure given FTT and frequent admissions. Pt's son would like to continue current treatment. Pt had expressed to his son that he doess not wish to go back to rehab facilities. Pt's son will discuss with the pt. Pt is level 3 DNR/DNI.   Updated pt's sister bedside 7/29

## 2023-07-29 NOTE — ASSESSMENT & PLAN NOTE
Patient with aspiration pneumonia, with large and of debris in the bronchus  IV Cefepime and IV Flagyl   F/u cultures  Dysphagia diet per speech/input appreciated   Lactic acidosis normalized after IVF

## 2023-07-29 NOTE — ASSESSMENT & PLAN NOTE
· Diabetes mellitus prior to admission on januvia and glimepiride  · Morning glucose has remained stable without need for sliding scale    Results from last 7 days   Lab Units 07/31/23  0453 07/30/23  0537 07/29/23  0631 07/28/23  0528   GLUCOSE RANDOM mg/dL 116 92 92 86

## 2023-07-29 NOTE — PROGRESS NOTES
Progress Note - Pulmonary   Leonor Daughters 78 y.o. male MRN: [id number]  Unit/Bed#: 2 [place] [address] Encounter: [id number]    Assessment:  Right exudative pleural effusion  Parapneumonic effusion  Status post chest tube placement      Plan:  Status post chest tube placement yesterday with only 30 mL drainage since yesterday, ICU team for placing in tPA will continue to monitor the output and follow-up with chest x-ray in a.m. Discussed with the patient's family at the bedside    Chief Complaint:   Pleural effusion    Subjective:   Still having cough bringing up phlegm    Objective:     Vitals: Blood pressure 125/76, pulse 84, temperature (!) 97.4 °F (36.3 °C), resp. rate 20, height 5' 9" (1.753 m), weight 68.1 kg (150 lb 1.6 oz), SpO2 100 %. ,Body mass index is 22.17 kg/m².       Intake/Output Summary (Last 24 hours) at 7/29/2023 1112  Last data filed at 7/29/2023 0601  Gross per 24 hour   Intake 420 ml   Output 215 ml   Net 205 ml       Invasive Devices     Peripheral Intravenous Line  Duration           Peripheral IV 07/27/23 Left Antecubital 1 day          Drain  Duration           External Urinary Catheter Small 19 days    Chest Tube 1 Right Pleural 10 Fr. <1 day                Physical Exam: Ill looking male in bed in no acute respiratory distress  Eyes anicteric sclera, conjunctive is clear  ENT nares is patent, no evidence of any discharge  Lungs diminished breath sounds bilaterally  Chest tube on the right draining serous fluid a total of 80 mL since the placement of the chest tube 30 less than 30 mL in the past 24 hours  Heart first and second heart sounds are no murmur or gallop is heard  Abdomen soft nontender bowel sounds are present  Extremities bilateral trace pedal edema  CNS awake and alert    Labs:   CBC:   Lab Results   Component Value Date    WBC 3.66 (L) 07/29/2023    HGB 8.7 (L) 07/29/2023    HCT 27.4 (L) 07/29/2023     (H) 07/29/2023     (L) 07/29/2023    RBC 2.68 (L) 07/29/2023    MCH 32.5 07/29/2023    MCHC 31.8 07/29/2023    RDW 15.0 07/29/2023    MPV 9.4 07/29/2023    NRBC 0 07/29/2023     Imaging and other studies: I have personally reviewed pertinent films in PACS

## 2023-07-29 NOTE — ASSESSMENT & PLAN NOTE
· Empyema of the lung requiring s/p chest tube placement on 6/24 and removal on 7/7. Received tPA/dornase administration and 7-day course of cefepime during previous admission. · Chest tube placed again on 7/28  by IR due to persistent right hydropneumothorax. Pleural effusion PH 7.8 with no evidence of recurrent empyema. Follow-up cultures  · IR and pulmonary following for chest tube management. tPA/dornase administration today  · Complaining of pain around chest tube site, will add low-dose oxycodone and IV Dilaudid as needed. Monitor mental status closely    Chest tube in appropriate position, good output, minimal pain, appreciate pulmonology recommendations.   Patient is oxygenating well, no altered mental status

## 2023-07-29 NOTE — PROGRESS NOTES
[facility]  Progress Note  Name: Iesha Moore  MRN: [id number]  Unit/Bed#: 2 [facility] I Date of Admission: 7/27/2023   Date of Service: 7/29/2023 I Hospital Day: 2    Assessment/Plan   * Aspiration pneumonia [facility])  Assessment & Plan  Patient with aspiration pneumonia, with large and of debris in the bronchus  IV Cefepime and IV Flagyl   F/u cultures  Dysphagia diet per speech/input appreciated   Lactic acidosis normalized after IVF      Sepsis [facility])  Assessment & Plan  POA due to respiratory infection    Recent empyema of lung with persistent locuted R hydropneumothorax  Assessment & Plan  · Empyema of the lung requiring s/p chest tube placement on 6/24 and removal on 7/7. Received tPA/dornase administration and 7-day course of cefepime during previous admission. · Chest tube placed again on 7/28  by IR due to persistent right hydropneumothorax. Pleural effusion PH 7.8 with no evidence of recurrent empyema. Follow-up cultures  · IR and pulmonary following for chest tube management. For tPA/dornase administration today  · Complaining of pain around chest tube site, will add low-dose oxycodone and IV Dilaudid as needed. Monitor mental status closely      Chronic combined systolic and diastolic congestive heart failure (HCC)  Assessment & Plan  Wt Readings from Last 3 Encounters:   07/27/23 68.1 kg (150 lb 1.6 oz)   07/10/23 64.9 kg (143 lb 1.3 oz)   06/28/23 74.5 kg (164 lb 3.9 oz)     No evidence of exacerbation, appears below historical dry weight of around 164 pounds. Given poor intake, continue to hold diuretic        Recent pericardial effusion  Assessment & Plan  Patient received pericardiocentesis on 6/30/2023 for tamponade, recovered. Atrial fibrillation with slow ventricular response (HCC)  Assessment & Plan  Ventricle rate controlled  Continue Eliquis    Goals of care, counseling/discussion  Assessment & Plan  Discussed with patient's son on 7/28 regarding goals of care. Discussed the possibility of comfort measure given FTT and frequent admissions. Pt's son would like to continue current treatment. Pt had expressed to his son that he doess not wish to go back to rehab facilities. Pt's son will discuss with the pt. Pt is level 3 DNR/DNI. Updated pt's sister bedside      Acute kidney injury superimposed on chronic kidney disease [facility])  Assessment & Plan  Lab Results   Component Value Date    EGFR 20 2023    EGFR 21 2023    EGFR 19 2023    CREATININE 2.68 (H) 2023    CREATININE 2.59 (H) 2023    CREATININE 2.80 (H) 2023   Patient with KARINA on CKD stage IV  Elevated to 2.80, likely prerenal .  Renally dose all medication  Slightly improved to creatinine 2.68 today. Continue to hold torsemide for poor intake        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 ([address])  Assessment & Plan  Lab Results   Component Value Date    HGBA1C 8.3 (H) 2023       No results for input(s): "POCGLU" in the last 72 hours. Blood Sugar Average: Last 72 hrs:  Home regimen reviewed. Hold Metformin if applicable. Start SSI and Basal bolus protocol           VTE Pharmacologic Prophylaxis:   Pharmacologic: Apixaban (Eliquis)    Patient Centered Rounds:     Discussions with Specialists or Other Care Team Provider:     Education and Discussions with Family / Patient: Spoke with patient's sister at bedside    Current Length of Stay: 2 day(s)    Current Patient Status: Inpatient   Certification Statement: The patient will continue to require additional inpatient hospital stay due to on IV antibiotic/going chest tube management    Discharge Plan: Needs PT    Code Status: Level 3 - DNAR and DNI      Subjective:   Patient complained of pain around chest tube site, not relieved by extra strength Tylenol. Unable to rest well last night due to pain. Afebrile.   Alert and awake    Objective:     Vitals:   Temp (24hrs), Av.4 °F (36.3 °C), Min:97.1 °F (36.2 °C), Max:97.8 °F (36.6 °C)    Temp:  [97.1 °F (36.2 °C)-97.8 °F (36.6 °C)] 97.4 °F (36.3 °C)  HR:  [80-98] 84  Resp:  [18-20] 20  BP: ()/(57-81) 125/76  SpO2:  [89 %-100 %] 100 %  Body mass index is 22.17 kg/m². Input and Output Summary (last 24 hours): Intake/Output Summary (Last 24 hours) at 7/29/2023 1209  Last data filed at 7/29/2023 0601  Gross per 24 hour   Intake 420 ml   Output 215 ml   Net 205 ml       Physical Exam:     Physical Exam  Constitutional:       General: He is not in acute distress. Appearance: He is not ill-appearing, toxic-appearing or diaphoretic. Eyes:      General:         Right eye: No discharge. Left eye: No discharge. Cardiovascular:      Rate and Rhythm: Normal rate. Pulses: Normal pulses. Pulmonary:      Effort: Pulmonary effort is normal. No respiratory distress. Abdominal:      General: There is no distension. Palpations: Abdomen is soft. Skin:     General: Skin is warm. Neurological:      Mental Status: He is oriented to person, place, and time. Psychiatric:         Behavior: Behavior normal.         Additional Data:     Labs:    Results from last 7 days   Lab Units 07/29/23  0631   WBC Thousand/uL 3.66*   HEMOGLOBIN g/dL 8.7*   HEMATOCRIT % 27.4*   PLATELETS Thousands/uL 125*   NEUTROS PCT % 58   LYMPHS PCT % 25   MONOS PCT % 13*   EOS PCT % 2     Results from last 7 days   Lab Units 07/29/23  0631 07/28/23  0528 07/27/23  1648   POTASSIUM mmol/L 3.8   < > 4.7   CHLORIDE mmol/L 103   < > 96   CO2 mmol/L 30   < > 31   BUN mg/dL 34*   < > 36*   CREATININE mg/dL 2.68*   < > 2.80*   CALCIUM mg/dL 8.0*   < > 8.5   ALK PHOS U/L  --   --  134*   ALT U/L  --   --  21   AST U/L  --   --  20    < > = values in this interval not displayed.      Results from last 7 days   Lab Units 07/27/23  1648   INR  1.44*         Recent Cultures (last 7 days):     Results from last 7 days   Lab Units 07/28/23  1633 07/27/23  1722 07/27/23  1659 07/27/23  1654   BLOOD CULTURE   --   --  No Growth at 24 hrs. No Growth at 24 hrs. GRAM STAIN RESULT  No Polys or Bacteria seen  --   --   --    URINE CULTURE   --  10,000-19,000 cfu/ml  --   --        Last 24 Hours Medication List:   Current Facility-Administered Medications   Medication Dose Route Frequency Provider Last Rate   • acetaminophen  975 mg Oral Q8H Sylvester Castañeda MD     • allopurinol  100 mg Oral BID Carline Cowper, DO     • apixaban  2.5 mg Oral BID Carline Cowper, DO     • atorvastatin  40 mg Oral Daily With Dinner Carlinelamont Trevinoper, DO     • cefepime  1,000 mg Intravenous Q12H Carlinelamont Trevinoper, DO Stopped (07/29/23 0930)   • docusate sodium  100 mg Oral BID PRN Sylvester Castañeda MD     • HYDROmorphone  0.2 mg Intravenous Q4H PRN Sylvesetr Castañeda MD     • levalbuterol  1.25 mg Nebulization Q6H SUSANA Dubois     • metroNIDAZOLE  500 mg Intravenous Q8H Carlinelamont Trevinoper,  mg (07/29/23 9883)   • ondansetron  4 mg Intravenous Q6H PRN Carline Esparza DO     • oxyCODONE  2.5 mg Oral Q4H PRN Sylvester Castañeda MD     • tamsulosin  0.4 mg Oral Daily With Dinner Kenney Petit DO     • timolol  1 drop Both Eyes Daily Kenney Petit DO          Today, Patient Was Seen By: Sylvester Castañeda MD    ** Please Note: Dictation voice to text software may have been used in the creation of this document.  **

## 2023-07-29 NOTE — ASSESSMENT & PLAN NOTE
History of atrial fibrillation congestive heart failure CKD 4 diabetes and recent pericardial effusion presents to the hospital with shortness of breath found to have recurrent right exudative pleural effusion/parapneumonic effusion  · Sepsis on admission secondary to aspiration pneumonia and effusion  · Currently remains on cefepime and metronidazole  · Chest tube in place being managed by pulmonology

## 2023-07-29 NOTE — ASSESSMENT & PLAN NOTE
· Empyema of the lung requiring s/p chest tube placement on 6/24 and removal on 7/7. Received tPA/dornase administration and 7-day course of cefepime during previous admission. · Chest tube placed again on 7/28  by IR due to persistent right hydropneumothorax. Pleural effusion PH 7.8 with no evidence of recurrent empyema. Follow-up cultures  · IR and pulmonary following for chest tube management. For tPA/dornase administration today  · Complaining of pain around chest tube site, will add low-dose oxycodone and IV Dilaudid as needed.   Monitor mental status closely

## 2023-07-29 NOTE — PLAN OF CARE
Problem: INFECTION - ADULT  Goal: Absence or prevention of progression during hospitalization  Description: INTERVENTIONS:  - Assess and monitor for signs and symptoms of infection  - Monitor lab/diagnostic results  - Monitor all insertion sites, i.e. indwelling lines, tubes, and drains  - Monitor endotracheal if appropriate and nasal secretions for changes in amount and color  - [place] appropriate cooling/warming therapies per order  - Administer medications as ordered  - Instruct and encourage patient and family to use good hand hygiene technique  - Identify and instruct in appropriate isolation precautions for identified infection/condition  Outcome: Progressing     Problem: RESPIRATORY - ADULT  Goal: Achieves optimal ventilation and oxygenation  Description: INTERVENTIONS:  - Assess for changes in respiratory status  - Assess for changes in mentation and behavior  - Position to facilitate oxygenation and minimize respiratory effort  - Oxygen administered by appropriate delivery if ordered  - Initiate smoking cessation education as indicated  - Encourage broncho-pulmonary hygiene including cough, deep breathe, Incentive Spirometry  - Assess the need for suctioning and aspirate as needed  - Assess and instruct to report SOB or any respiratory difficulty  - Respiratory Therapy support as indicated  Outcome: Progressing     Problem: Nutrition/Hydration-ADULT  Goal: Nutrient/Hydration intake appropriate for improving, restoring or maintaining nutritional needs  Description: Monitor and assess patient's nutrition/hydration status for malnutrition. Collaborate with interdisciplinary team and initiate plan and interventions as ordered. Monitor patient's weight and dietary intake as ordered or per policy. Utilize nutrition screening tool and intervene as necessary. Determine patient's food preferences and provide high-protein, high-caloric foods as appropriate.      INTERVENTIONS:  - Monitor oral intake, urinary output, labs, and treatment plans  - Assess nutrition and hydration status and recommend course of action  - Evaluate amount of meals eaten  - Assist patient with eating if necessary   - Allow adequate time for meals  - Recommend/ encourage appropriate diets, oral nutritional supplements, and vitamin/mineral supplements  - Order, calculate, and assess calorie counts as needed  - Recommend, monitor, and adjust tube feedings and TPN/PPN based on assessed needs  - Assess need for intravenous fluids  - Provide specific nutrition/hydration education as appropriate  - Include patient/family/caregiver in decisions related to nutrition  Outcome: Progressing

## 2023-07-29 NOTE — ASSESSMENT & PLAN NOTE
· Combined CHF currently torsemide on hold due to poor intake    Wt Readings from Last 3 Encounters:   07/27/23 68.1 kg (150 lb 1.6 oz)   07/10/23 64.9 kg (143 lb 1.3 oz)   06/28/23 74.5 kg (164 lb 3.9 oz)

## 2023-07-29 NOTE — ASSESSMENT & PLAN NOTE
· Sees nephrology as an outpatient baseline creatinine closer to 2.3.    · Holding torsemide secondary to poor intake.     Results from last 7 days   Lab Units 07/31/23  0453 07/30/23  0537 07/29/23  0631 07/28/23  0528 07/27/23  1648   BUN mg/dL 32* 32* 34* 36* 36*   CREATININE mg/dL 2.29* 2.38* 2.68* 2.59* 2.80*   EGFR ml/min/1.73sq m 25 24 20 21 19

## 2023-07-29 NOTE — PLAN OF CARE
Problem: INFECTION - ADULT  Goal: Absence or prevention of progression during hospitalization  Description: INTERVENTIONS:  - Assess and monitor for signs and symptoms of infection  - Monitor lab/diagnostic results  - Monitor all insertion sites, i.e. indwelling lines, tubes, and drains  - Monitor endotracheal if appropriate and nasal secretions for changes in amount and color  - [place] appropriate cooling/warming therapies per order  - Administer medications as ordered  - Instruct and encourage patient and family to use good hand hygiene technique  - Identify and instruct in appropriate isolation precautions for identified infection/condition  Outcome: Progressing  Goal: Absence of fever/infection during neutropenic period  Description: INTERVENTIONS:  - Monitor WBC    Outcome: Progressing     Problem: METABOLIC, FLUID AND ELECTROLYTES - ADULT  Goal: Electrolytes maintained within normal limits  Description: INTERVENTIONS:  - Monitor labs and assess patient for signs and symptoms of electrolyte imbalances  - Administer electrolyte replacement as ordered  - Monitor response to electrolyte replacements, including repeat lab results as appropriate  - Instruct patient on fluid and nutrition as appropriate  Outcome: Progressing  Goal: Fluid balance maintained  Description: INTERVENTIONS:  - Monitor labs   - Monitor I/O and WT  - Instruct patient on fluid and nutrition as appropriate  - Assess for signs & symptoms of volume excess or deficit  Outcome: Progressing     Problem: RESPIRATORY - ADULT  Goal: Achieves optimal ventilation and oxygenation  Description: INTERVENTIONS:  - Assess for changes in respiratory status  - Assess for changes in mentation and behavior  - Position to facilitate oxygenation and minimize respiratory effort  - Oxygen administered by appropriate delivery if ordered  - Initiate smoking cessation education as indicated  - Encourage broncho-pulmonary hygiene including cough, deep breathe, Incentive Spirometry  - Assess the need for suctioning and aspirate as needed  - Assess and instruct to report SOB or any respiratory difficulty  - Respiratory Therapy support as indicated  Outcome: Progressing     Problem: Nutrition/Hydration-ADULT  Goal: Nutrient/Hydration intake appropriate for improving, restoring or maintaining nutritional needs  Description: Monitor and assess patient's nutrition/hydration status for malnutrition. Collaborate with interdisciplinary team and initiate plan and interventions as ordered. Monitor patient's weight and dietary intake as ordered or per policy. Utilize nutrition screening tool and intervene as necessary. Determine patient's food preferences and provide high-protein, high-caloric foods as appropriate.      INTERVENTIONS:  - Monitor oral intake, urinary output, labs, and treatment plans  - Assess nutrition and hydration status and recommend course of action  - Evaluate amount of meals eaten  - Assist patient with eating if necessary   - Allow adequate time for meals  - Recommend/ encourage appropriate diets, oral nutritional supplements, and vitamin/mineral supplements  - Order, calculate, and assess calorie counts as needed  - Recommend, monitor, and adjust tube feedings and TPN/PPN based on assessed needs  - Assess need for intravenous fluids  - Provide specific nutrition/hydration education as appropriate  - Include patient/family/caregiver in decisions related to nutrition  Outcome: Progressing

## 2023-07-30 ENCOUNTER — APPOINTMENT (INPATIENT)
Dept: RADIOLOGY | Facility: HOSPITAL | Age: 83
DRG: 871 | End: 2023-07-30
Payer: MEDICARE

## 2023-07-30 LAB
ANION GAP SERPL CALCULATED.3IONS-SCNC: 6 MMOL/L
BASOPHILS # BLD AUTO: 0.01 THOUSANDS/ÂΜL (ref 0–0.1)
BASOPHILS NFR BLD AUTO: 0 % (ref 0–1)
BUN SERPL-MCNC: 32 MG/DL (ref 5–25)
CALCIUM SERPL-MCNC: 8 MG/DL (ref 8.4–10.2)
CHLORIDE SERPL-SCNC: 102 MMOL/L (ref 96–108)
CO2 SERPL-SCNC: 27 MMOL/L (ref 21–32)
CREAT SERPL-MCNC: 2.38 MG/DL (ref 0.6–1.3)
EOSINOPHIL # BLD AUTO: 0.07 THOUSAND/ÂΜL (ref 0–0.61)
EOSINOPHIL NFR BLD AUTO: 2 % (ref 0–6)
ERYTHROCYTE [DISTWIDTH] IN BLOOD BY AUTOMATED COUNT: 15 % (ref 11.6–15.1)
GFR SERPL CREATININE-BSD FRML MDRD: 24 ML/MIN/1.73SQ M
GLUCOSE SERPL-MCNC: 92 MG/DL (ref 65–140)
HCT VFR BLD AUTO: 28.2 % (ref 36.5–49.3)
HGB BLD-MCNC: 9 G/DL (ref 12–17)
IMM GRANULOCYTES # BLD AUTO: 0.03 THOUSAND/UL (ref 0–0.2)
IMM GRANULOCYTES NFR BLD AUTO: 1 % (ref 0–2)
LYMPHOCYTES # BLD AUTO: 0.96 THOUSANDS/ÂΜL (ref 0.6–4.47)
LYMPHOCYTES NFR BLD AUTO: 22 % (ref 14–44)
MCH RBC QN AUTO: 32.8 PG (ref 26.8–34.3)
MCHC RBC AUTO-ENTMCNC: 31.9 G/DL (ref 31.4–37.4)
MCV RBC AUTO: 103 FL (ref 82–98)
MONOCYTES # BLD AUTO: 0.51 THOUSAND/ÂΜL (ref 0.17–1.22)
MONOCYTES NFR BLD AUTO: 12 % (ref 4–12)
MRSA NOSE QL CULT: NORMAL
NEUTROPHILS # BLD AUTO: 2.82 THOUSANDS/ÂΜL (ref 1.85–7.62)
NEUTS SEG NFR BLD AUTO: 63 % (ref 43–75)
NRBC BLD AUTO-RTO: 0 /100 WBCS
PLATELET # BLD AUTO: 113 THOUSANDS/UL (ref 149–390)
PMV BLD AUTO: 9.4 FL (ref 8.9–12.7)
POTASSIUM SERPL-SCNC: 3.8 MMOL/L (ref 3.5–5.3)
RBC # BLD AUTO: 2.74 MILLION/UL (ref 3.88–5.62)
SODIUM SERPL-SCNC: 135 MMOL/L (ref 135–147)
WBC # BLD AUTO: 4.4 THOUSAND/UL (ref 4.31–10.16)

## 2023-07-30 PROCEDURE — 94640 AIRWAY INHALATION TREATMENT: CPT

## 2023-07-30 PROCEDURE — 99232 SBSQ HOSP IP/OBS MODERATE 35: CPT | Performed by: INTERNAL MEDICINE

## 2023-07-30 PROCEDURE — 99232 SBSQ HOSP IP/OBS MODERATE 35: CPT | Performed by: STUDENT IN AN ORGANIZED HEALTH CARE EDUCATION/TRAINING PROGRAM

## 2023-07-30 PROCEDURE — 71045 X-RAY EXAM CHEST 1 VIEW: CPT

## 2023-07-30 PROCEDURE — 85025 COMPLETE CBC W/AUTO DIFF WBC: CPT | Performed by: INTERNAL MEDICINE

## 2023-07-30 PROCEDURE — 94760 N-INVAS EAR/PLS OXIMETRY 1: CPT

## 2023-07-30 PROCEDURE — 80048 BASIC METABOLIC PNL TOTAL CA: CPT | Performed by: INTERNAL MEDICINE

## 2023-07-30 RX ADMIN — LEVALBUTEROL HYDROCHLORIDE 1.25 MG: 1.25 SOLUTION RESPIRATORY (INHALATION) at 14:06

## 2023-07-30 RX ADMIN — LEVALBUTEROL HYDROCHLORIDE 1.25 MG: 1.25 SOLUTION RESPIRATORY (INHALATION) at 19:37

## 2023-07-30 RX ADMIN — ACETAMINOPHEN 975 MG: 650 SUSPENSION ORAL at 17:34

## 2023-07-30 RX ADMIN — CEFEPIME HYDROCHLORIDE 1000 MG: 1 INJECTION, SOLUTION INTRAVENOUS at 21:33

## 2023-07-30 RX ADMIN — ACETAMINOPHEN 975 MG: 650 SUSPENSION ORAL at 02:15

## 2023-07-30 RX ADMIN — APIXABAN 2.5 MG: 2.5 TABLET, FILM COATED ORAL at 17:34

## 2023-07-30 RX ADMIN — ACETAMINOPHEN 975 MG: 650 SUSPENSION ORAL at 09:10

## 2023-07-30 RX ADMIN — Medication 2.5 MG: at 07:46

## 2023-07-30 RX ADMIN — METRONIDAZOLE 500 MG: 500 INJECTION, SOLUTION INTRAVENOUS at 21:33

## 2023-07-30 RX ADMIN — ALLOPURINOL 100 MG: 100 TABLET ORAL at 17:34

## 2023-07-30 RX ADMIN — TAMSULOSIN HYDROCHLORIDE 0.4 MG: 0.4 CAPSULE ORAL at 17:33

## 2023-07-30 RX ADMIN — METRONIDAZOLE 500 MG: 500 INJECTION, SOLUTION INTRAVENOUS at 05:13

## 2023-07-30 RX ADMIN — ATORVASTATIN CALCIUM 40 MG: 40 TABLET, FILM COATED ORAL at 17:33

## 2023-07-30 RX ADMIN — METRONIDAZOLE 500 MG: 500 INJECTION, SOLUTION INTRAVENOUS at 14:44

## 2023-07-30 RX ADMIN — TIMOLOL MALEATE 1 DROP: 5 SOLUTION/ DROPS OPHTHALMIC at 07:50

## 2023-07-30 RX ADMIN — DORNASE ALFA 5 MG: 1 SOLUTION RESPIRATORY (INHALATION) at 12:35

## 2023-07-30 RX ADMIN — Medication 2.5 MG: at 21:33

## 2023-07-30 RX ADMIN — CEFEPIME HYDROCHLORIDE 1000 MG: 1 INJECTION, SOLUTION INTRAVENOUS at 09:12

## 2023-07-30 RX ADMIN — LEVALBUTEROL HYDROCHLORIDE 1.25 MG: 1.25 SOLUTION RESPIRATORY (INHALATION) at 07:08

## 2023-07-30 RX ADMIN — ALLOPURINOL 100 MG: 100 TABLET ORAL at 09:11

## 2023-07-30 RX ADMIN — APIXABAN 2.5 MG: 2.5 TABLET, FILM COATED ORAL at 09:11

## 2023-07-30 RX ADMIN — ALTEPLASE 10 MG: 2.2 INJECTION, POWDER, LYOPHILIZED, FOR SOLUTION INTRAVENOUS at 12:35

## 2023-07-30 NOTE — ASSESSMENT & PLAN NOTE
· Anemia without evidence of blood loss. Likely chronic disease.     Results from last 7 days   Lab Units 07/31/23  0453 07/30/23  0537 07/29/23  0631 07/28/23  0528   HEMOGLOBIN g/dL 8.0* 9.0* 8.7* 8.6*

## 2023-07-30 NOTE — PROGRESS NOTES
INTERNAL MEDICINE PROGRESS NOTE     Name: Sharath Deng   Age & Sex: 78 y.o. 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 ([address])    Recent pericardial effusion    Chronic combined systolic and diastolic congestive heart failure (HCC)    Atrial fibrillation with slow ventricular response (HCC)    Acute kidney injury superimposed on chronic kidney disease ([address])    Anemia    Hyponatremia    Goals of care, counseling/discussion    Sepsis ([address])      * Aspiration pneumonia ([address])  Assessment & Plan  Patient with aspiration pneumonia, with large and of debris in the bronchus  IV Cefepime and IV Flagyl   F/u cultures  Dysphagia diet per speech/input appreciated   Lactic acidosis normalized after IVF       Recent empyema of lung with persistent locuted R hydropneumothorax  Assessment & Plan  · Empyema of the lung requiring s/p chest tube placement on 6/24 and removal on 7/7. Received tPA/dornase administration and 7-day course of cefepime during previous admission. · Chest tube placed again on 7/28  by IR due to persistent right hydropneumothorax. Pleural effusion PH 7.8 with no evidence of recurrent empyema. Follow-up cultures  · IR and pulmonary following for chest tube management. tPA/dornase administration today  · Complaining of pain around chest tube site, will add low-dose oxycodone and IV Dilaudid as needed. Monitor mental status closely    Chest tube in appropriate position, good output, minimal pain, appreciate pulmonology recommendations.   Patient is oxygenating well, no altered mental status      Sepsis [facility])  Assessment & Plan  POA due to respiratory infection    Goals of care, counseling/discussion  Assessment & Plan  Discussed with patient's son on 7/28 regarding goals of care. Discussed the possibility of comfort measure given FTT and frequent admissions. Pt's son would like to continue current treatment. Pt had expressed to his son that he doess not wish to go back to rehab facilities. Pt's son will discuss with the pt. Pt is level 3 DNR/DNI. Updated pt's sister bedside 7/29     Hyponatremia  Assessment & Plan  Recent Labs     07/27/23  1648 07/28/23  0528 07/29/23  0631 07/30/23  0537   SODIUM 133* 136 138 135     Mild hyponatremia, close monitor with IV fluid      Anemia  Assessment & Plan  We will check iron panel, anemia seems improved from previous discharge 2 weeks ago  No acute indication for transfusion    Acute kidney injury superimposed on chronic kidney disease [facility])  Assessment & Plan  Lab Results   Component Value Date    EGFR 20 07/29/2023    EGFR 21 07/28/2023    EGFR 19 07/27/2023    CREATININE 2.68 (H) 07/29/2023    CREATININE 2.59 (H) 07/28/2023    CREATININE 2.80 (H) 07/27/2023   Patient with KARINA on CKD stage IV  Elevated to 2.80, likely prerenal  Renally dose all medication  Continue to hold torsemide for poor intake  Creatinine continues to improve off diuretic        Atrial fibrillation with slow ventricular response (HCC)  Assessment & Plan  Ventricle rate controlled  Continue Eliquis    Chronic combined systolic and diastolic congestive heart failure (HCC)  Assessment & Plan  Wt Readings from Last 3 Encounters:   07/27/23 68.1 kg (150 lb 1.6 oz)   07/10/23 64.9 kg (143 lb 1.3 oz)   06/28/23 74.5 kg (164 lb 3.9 oz)     No evidence of exacerbation, appears below historical dry weight of around 164 pounds. Given poor intake, continue to hold diuretic        Recent pericardial effusion  Assessment & Plan  Patient received pericardiocentesis on 6/30/2023 for tamponade, recovered.   Vitals stable on exam    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 [facility])  Assessment & Plan  Lab Results   Component Value Date    HGBA1C 8.3 (H) 2023       No results for input(s): "POCGLU" in the last 72 hours. Blood Sugar Average: Last 72 hrs:  Home regimen reviewed. Hold Metformin if applicable. Start SSI and Basal bolus protocol    Essential hypertension  Assessment & Plan  Hold home torsemide at this time, blood pressure appropriate        VTE Pharmacologic Prophylaxis:   Pharmacologic: Apixaban (Eliquis)  Mechanical VTE Prophylaxis in Place: Yes    Patient Centered Rounds: I have performed bedside rounds with nursing staff today. Discussions with Specialists or Other Care Team Provider: Appreciate pulmonology recommendations    Education and Discussions with Family / Patient: We will discuss with family at bedside this afternoon    Time Spent for Care: 45 minutes. More than 50% of total time spent on counseling and coordination of care as described above. Current Length of Stay: 3 day(s)    Current Patient Status: Inpatient   Certification Statement: The patient will continue to require additional inpatient hospital stay due to Pneumonia antibiotic treatment, right-sided chest tube    Discharge Plan / Estimated Discharge Date: 48-72 hrs. Code Status: Level 3 - DNAR and DNI    Subjective:     Patient seen and examined at bedside this morning, overall feeling better, mental status has improved, pulmonology following with chest tube right-sided placement, oxygen appropriate, no overnight events. Objective:   Vitals:   Temp (24hrs), Av.7 °F (36.5 °C), Min:97.3 °F (36.3 °C), Max:98 °F (36.7 °C)    Temp:  [97.3 °F (36.3 °C)-98 °F (36.7 °C)] 97.6 °F (36.4 °C)  HR:  [] 115  Resp:  [16-18] 18  BP: (114-160)/(68-80) 160/77  SpO2:  [98 %-100 %] 98 %  Body mass index is 22.17 kg/m². Input and Output Summary (last 24 hours):      Intake/Output Summary (Last 24 hours) at 2023 1048  Last data filed at 2023 0529  Gross per 24 hour   Intake 200 ml   Output 660 ml   Net -460 ml     Physical Exam:   Physical Exam  Constitutional:       General: He is not in acute distress. HENT:      Head: Normocephalic and atraumatic. Eyes:      General: No scleral icterus. Conjunctiva/sclera: Conjunctivae normal.   Cardiovascular:      Rate and Rhythm: Normal rate and regular rhythm. Pulses: Normal pulses. Heart sounds: Murmur heard. Pulmonary:      Effort: Pulmonary effort is normal. No respiratory distress. Breath sounds: Rhonchi present. No wheezing. Comments: Decreased breath sounds right lower lobe  Chest tube in place without signs of infection  Abdominal:      General: Bowel sounds are normal. There is no distension. Tenderness: There is no abdominal tenderness. Musculoskeletal:         General: No swelling. Normal range of motion. Skin:     General: Skin is warm and dry. Capillary Refill: Capillary refill takes less than 2 seconds. Coloration: Skin is not jaundiced. Neurological:      General: No focal deficit present. Mental Status: He is alert and oriented to person, place, and time. Mental status is at baseline.    Psychiatric:         Mood and Affect: Mood normal.         Behavior: Behavior normal.         Additional Data:   Basic Laboratory Review:   Results from last 7 days   Lab Units 07/30/23  0537 07/29/23  0631 07/28/23  0528 07/27/23  1648   SODIUM mmol/L 135 138 136 133*   POTASSIUM mmol/L 3.8 3.8 3.8 4.7   CHLORIDE mmol/L 102 103 101 96   CO2 mmol/L 27 30 29 31   BUN mg/dL 32* 34* 36* 36*   CREATININE mg/dL 2.38* 2.68* 2.59* 2.80*   GLUCOSE RANDOM mg/dL 92 92 86 174*   CALCIUM mg/dL 8.0* 8.0* 8.2* 8.5   ALBUMIN g/dL  --   --   --  3.1*   ALK PHOS U/L  --   --   --  134*   ALT U/L  --   --   --  21   AST U/L  --   --   --  20   EGFR ml/min/1.73sq m 24 20 21 19       Results from last 7 days   Lab Units 07/30/23  0537 07/29/23  0631 07/28/23  0528   WBC Thousand/uL 4.40 3.66* 4.14* HEMOGLOBIN g/dL 9.0* 8.7* 8.6*   HEMATOCRIT % 28.2* 27.4* 26.1*   PLATELETS Thousands/uL 113* 125* 113*   NEUTROS PCT % 63 58 71   LYMPHS PCT % 22 25 17   MONOS PCT % 12 13* 10   EOS PCT % 2 2 1   BASOS PCT % 0 1 0   NEUTROS ABS Thousands/µL 2.82 2.18 2.97   LYMPHS ABS Thousands/µL 0.96 0.91 0.69   MONOS ABS Thousand/µL 0.51 0.47 0.42   EOS ABS Thousand/µL 0.07 0.06 0.02   BASOS ABS Thousands/µL 0.01 0.02 0.01     Results from last 7 days   Lab Units 07/27/23  1648   PROTIME seconds 17.7*   INR  1.44*   PTT seconds 41*           Additional Labs:  Results from last 7 days   Lab Units 07/27/23  1946 07/27/23  1648   LACTIC ACID mmol/L 1.3 2.8*                Recent Cultures (last 7 days):   Results from last 7 days   Lab Units 07/28/23  1633 07/27/23  1722 07/27/23  1659 07/27/23  1654   BLOOD CULTURE   --   --  No Growth at 48 hrs. No Growth at 48 hrs. GRAM STAIN RESULT  No Polys or Bacteria seen  --   --   --    URINE CULTURE   --  10,000-19,000 cfu/ml  --   --    BODY FLUID CULTURE, STERILE  No growth  --   --   --        * I Have Reviewed All Lab Data Listed Above. * Additional Pertinent Lab Tests Reviewed:  All Labs Within Last 24 Hours Reviewed    Imaging:  Prior imaging studies and reports reviewed    Last 24 Hours Medication List:   Current Facility-Administered Medications   Medication Dose Route Frequency Provider Last Rate   • acetaminophen  975 mg Oral Q8H Opal Cary MD     • allopurinol  100 mg Oral BID Angela Curb, DO     • apixaban  2.5 mg Oral BID Angela Curb, DO     • atorvastatin  40 mg Oral Daily With Dinner Kenney Hoang DO     • cefepime  1,000 mg Intravenous Q12H Angela Curb, DO 1,000 mg (07/30/23 0912)   • docusate sodium  100 mg Oral BID PRN Opal Cary MD     • HYDROmorphone  0.2 mg Intravenous Q4H PRN Opal Cary MD     • levalbuterol  1.25 mg Nebulization Q6H SUSANA Willett     • metroNIDAZOLE  500 mg Intravenous Q8H Angela Borden  mg (07/30/23 9022)   • ondansetron  4 mg Intravenous Q6H PRN Siomarahéctor Raineyots, DO     • oxyCODONE  2.5 mg Oral Q4H PRN Freeman Murcia MD     • tamsulosin  0.4 mg Oral Daily With Dinner Siomara Sullivan, DO     • timolol  1 drop Both Eyes Daily Kenney Rodriguez DO       Today, Patient Was Seen By: Joe Glover DO

## 2023-07-30 NOTE — PLAN OF CARE
Problem: INFECTION - ADULT  Goal: Absence or prevention of progression during hospitalization  Description: INTERVENTIONS:  - Assess and monitor for signs and symptoms of infection  - Monitor lab/diagnostic results  - Monitor all insertion sites, i.e. indwelling lines, tubes, and drains  - Monitor endotracheal if appropriate and nasal secretions for changes in amount and color  - [place] appropriate cooling/warming therapies per order  - Administer medications as ordered  - Instruct and encourage patient and family to use good hand hygiene technique  - Identify and instruct in appropriate isolation precautions for identified infection/condition  Outcome: Progressing  Goal: Absence of fever/infection during neutropenic period  Description: INTERVENTIONS:  - Monitor WBC    Outcome: Progressing     Problem: METABOLIC, FLUID AND ELECTROLYTES - ADULT  Goal: Electrolytes maintained within normal limits  Description: INTERVENTIONS:  - Monitor labs and assess patient for signs and symptoms of electrolyte imbalances  - Administer electrolyte replacement as ordered  - Monitor response to electrolyte replacements, including repeat lab results as appropriate  - Instruct patient on fluid and nutrition as appropriate  Outcome: Progressing  Goal: Fluid balance maintained  Description: INTERVENTIONS:  - Monitor labs   - Monitor I/O and WT  - Instruct patient on fluid and nutrition as appropriate  - Assess for signs & symptoms of volume excess or deficit  Outcome: Progressing     Problem: RESPIRATORY - ADULT  Goal: Achieves optimal ventilation and oxygenation  Description: INTERVENTIONS:  - Assess for changes in respiratory status  - Assess for changes in mentation and behavior  - Position to facilitate oxygenation and minimize respiratory effort  - Oxygen administered by appropriate delivery if ordered  - Initiate smoking cessation education as indicated  - Encourage broncho-pulmonary hygiene including cough, deep breathe, Incentive Spirometry  - Assess the need for suctioning and aspirate as needed  - Assess and instruct to report SOB or any respiratory difficulty  - Respiratory Therapy support as indicated  Outcome: Progressing     Problem: Nutrition/Hydration-ADULT  Goal: Nutrient/Hydration intake appropriate for improving, restoring or maintaining nutritional needs  Description: Monitor and assess patient's nutrition/hydration status for malnutrition. Collaborate with interdisciplinary team and initiate plan and interventions as ordered. Monitor patient's weight and dietary intake as ordered or per policy. Utilize nutrition screening tool and intervene as necessary. Determine patient's food preferences and provide high-protein, high-caloric foods as appropriate.      INTERVENTIONS:  - Monitor oral intake, urinary output, labs, and treatment plans  - Assess nutrition and hydration status and recommend course of action  - Evaluate amount of meals eaten  - Assist patient with eating if necessary   - Allow adequate time for meals  - Recommend/ encourage appropriate diets, oral nutritional supplements, and vitamin/mineral supplements  - Order, calculate, and assess calorie counts as needed  - Recommend, monitor, and adjust tube feedings and TPN/PPN based on assessed needs  - Assess need for intravenous fluids  - Provide specific nutrition/hydration education as appropriate  - Include patient/family/caregiver in decisions related to nutrition  Outcome: Progressing

## 2023-07-30 NOTE — PROGRESS NOTES
Progress Note - Pulmonary   Altha Duane 78 y.o. male MRN: [id number]  Unit/Bed#: 2 [place] [address] Encounter: [id number]    Assessment:  Right exudative pleural effusion  Parapneumonic effusion  Status post chest tube placed    Plan:  Patient is much more awake and alert today and he states he feels better with his breathing   status post chest tube placement and given decreased drainage and was given tPA 1 dose yesterday seems to have drained significantly of about 250 mL since then, chest x-ray this morning images reviewed with improvement in the left basal fluid , had another dose of tPA this morning we will continue to monitor the output follow-up with chest x-ray in a.m. Chief Complaint:   Pleural  effusion    Subjective:   Feeling much better today. Objective:     Vitals: Blood pressure 160/77, pulse 89, temperature 97.6 °F (36.4 °C), temperature source Oral, resp. rate 20, height 5' 9" (1.753 m), weight 68.1 kg (150 lb 1.6 oz), SpO2 98 %. ,Body mass index is 22.17 kg/m². Intake/Output Summary (Last 24 hours) at 7/30/2023 1425  Last data filed at 7/30/2023 0943  Gross per 24 hour   Intake 200 ml   Output 660 ml   Net -460 ml       Invasive Devices     Peripheral Intravenous Line  Duration           Peripheral IV 07/29/23 Dorsal (posterior); Right Forearm <1 day          Drain  Duration           External Urinary Catheter Small 20 days    Chest Tube 1 Right Pleural 10 Fr. 1 day    External Urinary Catheter <1 day                Physical Exam: Currently in bed in no acute respiratory distress  Eyes anicteric liter, conjunctive is clear  ENT nares is patent, no evidence of any discharge  Lungs diminished breath sounds on the right left lung clear  Heart first and second heart sounds are heard no murmur or gallop is heard  Extremities no pedal edema  CNS awake alert and oriented x3.     Labs:   CBC:   Lab Results   Component Value Date    WBC 4.40 07/30/2023    HGB 9.0 (L) 07/30/2023    HCT 28.2 (L) 07/30/2023     (H) 07/30/2023     (L) 07/30/2023    RBC 2.74 (L) 07/30/2023    MCH 32.8 07/30/2023    MCHC 31.9 07/30/2023    RDW 15.0 07/30/2023    MPV 9.4 07/30/2023    NRBC 0 07/30/2023     Imaging and other studies: I have personally reviewed pertinent films in PACS

## 2023-07-30 NOTE — ASSESSMENT & PLAN NOTE
Hold home torsemide at this time, blood pressure appropriate Pt states that the prescription prescript did not work last ov 08/19/2020 pt can be reached on cell phone [phone]

## 2023-07-30 NOTE — OCCUPATIONAL THERAPY NOTE
Occupational Therapy Evaluation/Treatment     07/29/23 1542   Note Type   Note type Evaluation   Pain Assessment   Pain Assessment Tool 0-10   Pain Score 5   Pain Location/Orientation Orientation: Right;Location: Chest   Restrictions/Precautions   Weight Bearing Precautions Per Order No   Other Precautions Chair Alarm; Bed Alarm; Fall Risk;O2  (has chest tube)   Home Living   Type of [address] [place] [address] Two level; Able to live on main level with bedroom/bathroom;Stairs to enter with rails  (has 5 MOHINDER)   Bathroom Shower/Tub Tub/shower unit   Bathroom Toilet Standard   Bathroom Equipment Grab bars in [address] [place]   Additional Comments Pt is an 78 y.o male who presented with SOB and difficulty breathing, CT scan showed dense consolidation in the lung. Pt admitted from [facility]. Prior Function   Level of [place] Independent with ADLs; Independent with functional mobility; Needs assistance with IADLS   Lives With Family;Son  (and sister in law)   Receives Help From Family   IADLs Family/Friend/Other provides transportation; Family/Friend/Other provides meals; Family/Friend/Other provides medication management   Vocational Retired   Comments Per pt,he lives with son and sister in law. Pt admitted from [facility]. Per pt he was indp with BADLs and functional mobility using RW. Pt does not drive.    Subjective   Subjective My head is itchy   ADL   Eating Assistance 5  Supervision/Setup   Grooming Assistance 4  Minimal Assistance   UB Bathing Assistance 3  Moderate Assistance   LB Bathing Assistance 2  Maximal Assistance   UB Dressing Assistance 4  Minimal Assistance   LB Dressing Assistance 2  Maximal [address]  1  Total Assistance  (has victor catheter)   Bed Mobility   Rolling R Unable to assess   Rolling L Unable to assess   Supine to Sit Unable to assess   Sit to Supine Unable to assess   Additional Comments Pt received supine in bed and was connected to chest tube. Pt prefer to stay in bed and ROBERTO Nguyen recommended not to get pt OOB. Transfers   Sit to Stand Unable to assess   Stand to Sit Unable to assess   Balance   Static Sitting   (unable to assess sitting and standing balance)   Activity Tolerance   Activity Tolerance Patient limited by fatigue;Treatment limited secondary to medical complications (Comment)  (chest tube attachment)   Nurse Made Aware yes, Sandford Goltz Assessment   RUE Assessment WFL   LUE Assessment   LUE Assessment WFL   Vision-Basic Assessment   Current Vision Wears glasses all the time   Cognition   Overall Cognitive Status [facility]   Arousal/Participation Alert; Responsive; Cooperative   Orientation Level Oriented to person;Oriented to place; Disoriented to time;Disoriented to situation   Following Commands Follows one step commands with increased time or repetition   Assessment   Limitation Decreased ADL status; Decreased UE strength;Decreased endurance;Decreased self-care trans;Decreased high-level ADLs   Prognosis Good   Assessment Patient evaluated by Occupational Therapy. Patient admitted with Aspiration pneumonia ([address]). The patients occupational profile, medical and therapy history includes a extensive additional review of physical, cognitive, or psychosocial history related to current functional performance. Comorbidities affecting functional mobility and ADLS include:atrial fibrillation, diabetes melitus, heart failure. Prior to admission, patient was independent with functional mobility with RW, independent with ADLS, requiring assist for IADLS, ambulating household distance, retired, home with family assist and was at [employer]. The evaluation identifies the following performance deficits: weakness, decreased endurance, decreased coordination, increased fall risk, decreased ADLS, pain, decreased activity tolerance, SOB upon exertion and decreased strength, that result in activity limitations and/or participation restrictions. This evaluation requires clinical decision making of high complexity, because the patient presents with comorbidites that affect occupational performance and required significant modification of tasks or assistance with consideration of multiple treatment options. The Barthel Index was used as a functional outcome tool presenting with a score of Barthel Index Score: 20, indicating marked limitations of functional mobility and ADLS. The patient's raw score on the -PAC Daily Activity Inpatient Short Form is  . A raw score of less than 19 suggests the patient may benefit from discharge to post-acute rehabilitation services. Please refer to the recommendation of the Occupational Therapist for safe discharge planning. Please refer to the recommendation of the Occupational Therapist for safe DC planning. Patient will benefit from skilled Occupational Therapy services to address above deficits and facilitate a safe return to prior level of function. Goals   Patient Goals "go back"   STG Time Frame   (1-7 days)   Short Term Goal  Goals:" go back"   Eating: independent; Grooming: supervision seated; Bathing: min assist and mod assist; Upper Body Dressing supervision; Lower Body Dressing: mod assist; Toileting: max assist; Patient will increase stand pivot commode transfer to mod assist with rolling walker to increase performance and safety with ADLS and functional mobility; Patient will increase standing tolerance to 2 minutes during ADL task to decrease assistance level and decrease fall risk; Patient will increase bed mobility to mod assist in preparation for ADLS and transfers;  Patient will increase functional mobility to and from bathroom with rolling walker with mod assist to increase performance with ADLS and to use a toilet; Patient will tolerate 5 minutes of UE ROM/strengthening to increase general activity tolerance and performance in ADLS/IADLS; Patient will improve functional activity tolerance to 5 minutes of sustained functional tasks to increase participation in basic self-care and decrease assistance level;  Patient will be able to to verbalize understanding and perform energy conservation/proper body mechanics during ADLS and functional mobility at least 75% of the time with minimal cueing to decrease signs of fatigue and increase stamina to return to prior level of function; Patient will increase static sitting balance to fair to improve the ability to sit at edge of bed or on a chair for ADLS;  Patient will increase static standing balance to poor+ to improve postural stability and decrease fall risk during standing ADLS and transfers. LTG Time Frame   (8-14 days)   Long Term Goal Grooming: independent seated; Bathing: supervision and min assist; Upper Body Dressing independent; Lower Body Dressing: min assist Toileting: mod assist; Patient will increase ambulatory standard toilet transfer to min assist with rolling walker to increase performance and safety with ADLS and functional mobility; Patient will increase standing tolerance to 5 minutes during ADL task to decrease assistance level and decrease fall risk; Patient will increase bed mobility to min assist in preparation for ADLS and transfers;  Patient will increase functional mobility to and from bathroom with rolling walker with min assist to increase performance with ADLS and to use a toilet; Patient will tolerate 10 minutes of UE ROM/strengthening to increase general activity tolerance and performance in ADLS/IADLS; Patient will improve functional activity tolerance to 10 minutes of sustained functional tasks to increase participation in basic self-care and decrease assistance level;  Patient will be able to to verbalize understanding and perform energy conservation/proper body mechanics during ADLS and functional mobility at least 90% of the time with no cueing to decrease signs of fatigue and increase stamina to return to prior level of function; Patient will increase static sitting balance to fair+ to improve the ability to sit at edge of bed or on a chair for ADLS;  Patient will increase static standing balance to fair to improve postural stability and decrease fall risk during standing ADLS and transfers. Pt will score >/= 18/24 on AM-PAC Daily Activity Inpatient scale to promote safe independence with ADLs and functional mobility; Pt will score >/= 60/100 on Barthel Index in order to decrease caregiver assistance needed and increase ability to perform ADLs and functional mobility. Plan   Treatment Interventions ADL retraining;Functional transfer training;UE strengthening/ROM; Endurance training;Continued evaluation; Energy conservation; Activityengagement   Goal Expiration Date 08/12/23   OT Frequency 3-5x/wk   Recommendation   OT Discharge Recommendation Post acute rehabilitation services   AM-[facility] Daily Activity Inpatient   Lower Body Dressing 2   Bathing 2   Toileting 2   Upper Body Dressing 2   Grooming 3   Eating 4   Daily Activity Raw Score 15   Daily Activity Standardized Score (Calc for Raw Score >=11) 34.69   AM-PAC Applied Cognition Inpatient   Following a Speech/Presentation 3   Understanding Ordinary Conversation 3   Taking Medications 2   Remembering Where Things Are Placed or Put Away 3   Remembering List of 4-5 Errands 3   Taking Care of Complicated Tasks 2   Applied Cognition Raw Score 16   Applied Cognition Standardized Score 35.03   Barthel Index   Feeding 5   Bathing 0   Grooming Score 0   Dressing Score 5   Bladder Score 0   Bowels Score 10   Toilet Use Score 0   Transfers (Bed/Chair) Score 5   Mobility (Level Surface) Score 0   Stairs Score 0   Barthel Index Score 25   Additional Treatment Session   Start Time 1532   End Time 1542   Treatment Assessment S: "My head is itchy" O: Pt agreeable to therapy. Pt alert and responsive but sleepy during session. Pt received supine in bed and was connected to chest tube.  Pt prefer to stay in bed and RN Caterina Buckley recommended not to get pt OOB. Pt perform grooming (washing and drying face/head) and mouth care using oral swab/sponge while in bed with min A and set-up and with increased time for completion. Patient currently requiring max to total assistance for ADLs and functional mobility tasks. A: Patient limited by decreased level of arousal and deconditioned status. At end of session patient supine in bed with all needs met. Continue OT per POC. P: STR   End of Consult   Education Provided Yes   Patient Position at End of Consult Supine;Bed/Chair alarm activated; All needs within reach   Nurse Communication Nurse aware of consult   Licensure   [place] License Number  Miguel A Hyatt, [id number] [facility], OTR/L [id number]

## 2023-07-30 NOTE — ASSESSMENT & PLAN NOTE
Recent Labs     07/27/23  1648 07/28/23  0528 07/29/23  0631 07/30/23  0537   SODIUM 133* 136 138 135     Mild hyponatremia, close monitor with IV fluid

## 2023-07-31 ENCOUNTER — APPOINTMENT (INPATIENT)
Dept: RADIOLOGY | Facility: HOSPITAL | Age: 83
DRG: 871 | End: 2023-07-31
Payer: MEDICARE

## 2023-07-31 LAB
ALBUMIN SERPL BCP-MCNC: 2.7 G/DL (ref 3.5–5)
ALP SERPL-CCNC: 110 U/L (ref 34–104)
ALT SERPL W P-5'-P-CCNC: 13 U/L (ref 7–52)
ANION GAP SERPL CALCULATED.3IONS-SCNC: 5 MMOL/L
AST SERPL W P-5'-P-CCNC: 13 U/L (ref 13–39)
BACTERIA SPEC BFLD CULT: NO GROWTH
BASOPHILS # BLD AUTO: 0.02 THOUSANDS/ÂΜL (ref 0–0.1)
BASOPHILS NFR BLD AUTO: 0 % (ref 0–1)
BILIRUB SERPL-MCNC: 0.4 MG/DL (ref 0.2–1)
BUN SERPL-MCNC: 32 MG/DL (ref 5–25)
CALCIUM ALBUM COR SERPL-MCNC: 8.6 MG/DL (ref 8.3–10.1)
CALCIUM SERPL-MCNC: 7.6 MG/DL (ref 8.4–10.2)
CHLORIDE SERPL-SCNC: 101 MMOL/L (ref 96–108)
CO2 SERPL-SCNC: 27 MMOL/L (ref 21–32)
CREAT SERPL-MCNC: 2.29 MG/DL (ref 0.6–1.3)
EOSINOPHIL # BLD AUTO: 0.07 THOUSAND/ÂΜL (ref 0–0.61)
EOSINOPHIL NFR BLD AUTO: 2 % (ref 0–6)
ERYTHROCYTE [DISTWIDTH] IN BLOOD BY AUTOMATED COUNT: 15 % (ref 11.6–15.1)
FUNGUS SPEC CULT: NORMAL
GFR SERPL CREATININE-BSD FRML MDRD: 25 ML/MIN/1.73SQ M
GLUCOSE SERPL-MCNC: 116 MG/DL (ref 65–140)
GRAM STN SPEC: NORMAL
HCT VFR BLD AUTO: 25.6 % (ref 36.5–49.3)
HGB BLD-MCNC: 8 G/DL (ref 12–17)
IMM GRANULOCYTES # BLD AUTO: 0.03 THOUSAND/UL (ref 0–0.2)
IMM GRANULOCYTES NFR BLD AUTO: 1 % (ref 0–2)
LYMPHOCYTES # BLD AUTO: 1.2 THOUSANDS/ÂΜL (ref 0.6–4.47)
LYMPHOCYTES NFR BLD AUTO: 26 % (ref 14–44)
MCH RBC QN AUTO: 32.4 PG (ref 26.8–34.3)
MCHC RBC AUTO-ENTMCNC: 31.3 G/DL (ref 31.4–37.4)
MCV RBC AUTO: 104 FL (ref 82–98)
MONOCYTES # BLD AUTO: 0.56 THOUSAND/ÂΜL (ref 0.17–1.22)
MONOCYTES NFR BLD AUTO: 12 % (ref 4–12)
NEUTROPHILS # BLD AUTO: 2.82 THOUSANDS/ÂΜL (ref 1.85–7.62)
NEUTS SEG NFR BLD AUTO: 59 % (ref 43–75)
NRBC BLD AUTO-RTO: 0 /100 WBCS
PLATELET # BLD AUTO: 112 THOUSANDS/UL (ref 149–390)
PMV BLD AUTO: 9.9 FL (ref 8.9–12.7)
POTASSIUM SERPL-SCNC: 4.3 MMOL/L (ref 3.5–5.3)
PROT SERPL-MCNC: 4.6 G/DL (ref 6.4–8.4)
RBC # BLD AUTO: 2.47 MILLION/UL (ref 3.88–5.62)
SODIUM SERPL-SCNC: 133 MMOL/L (ref 135–147)
WBC # BLD AUTO: 4.7 THOUSAND/UL (ref 4.31–10.16)

## 2023-07-31 PROCEDURE — 94640 AIRWAY INHALATION TREATMENT: CPT

## 2023-07-31 PROCEDURE — 85025 COMPLETE CBC W/AUTO DIFF WBC: CPT | Performed by: STUDENT IN AN ORGANIZED HEALTH CARE EDUCATION/TRAINING PROGRAM

## 2023-07-31 PROCEDURE — 94760 N-INVAS EAR/PLS OXIMETRY 1: CPT

## 2023-07-31 PROCEDURE — 92611 MOTION FLUOROSCOPY/SWALLOW: CPT

## 2023-07-31 PROCEDURE — 74230 X-RAY XM SWLNG FUNCJ C+: CPT

## 2023-07-31 PROCEDURE — 99232 SBSQ HOSP IP/OBS MODERATE 35: CPT | Performed by: INTERNAL MEDICINE

## 2023-07-31 PROCEDURE — 80053 COMPREHEN METABOLIC PANEL: CPT | Performed by: STUDENT IN AN ORGANIZED HEALTH CARE EDUCATION/TRAINING PROGRAM

## 2023-07-31 RX ORDER — METRONIDAZOLE 500 MG/1
500 TABLET ORAL EVERY 8 HOURS SCHEDULED
Status: DISCONTINUED | OUTPATIENT
Start: 2023-07-31 | End: 2023-08-03

## 2023-07-31 RX ADMIN — ALLOPURINOL 100 MG: 100 TABLET ORAL at 17:26

## 2023-07-31 RX ADMIN — ATORVASTATIN CALCIUM 40 MG: 40 TABLET, FILM COATED ORAL at 15:37

## 2023-07-31 RX ADMIN — APIXABAN 2.5 MG: 2.5 TABLET, FILM COATED ORAL at 08:19

## 2023-07-31 RX ADMIN — ALLOPURINOL 100 MG: 100 TABLET ORAL at 08:19

## 2023-07-31 RX ADMIN — CEFEPIME HYDROCHLORIDE 1000 MG: 1 INJECTION, SOLUTION INTRAVENOUS at 10:50

## 2023-07-31 RX ADMIN — LEVALBUTEROL HYDROCHLORIDE 1.25 MG: 1.25 SOLUTION RESPIRATORY (INHALATION) at 07:19

## 2023-07-31 RX ADMIN — METRONIDAZOLE 500 MG: 500 INJECTION, SOLUTION INTRAVENOUS at 05:18

## 2023-07-31 RX ADMIN — TIMOLOL MALEATE 1 DROP: 5 SOLUTION/ DROPS OPHTHALMIC at 08:19

## 2023-07-31 RX ADMIN — LEVALBUTEROL HYDROCHLORIDE 1.25 MG: 1.25 SOLUTION RESPIRATORY (INHALATION) at 19:29

## 2023-07-31 RX ADMIN — METRONIDAZOLE 500 MG: 500 INJECTION, SOLUTION INTRAVENOUS at 13:20

## 2023-07-31 RX ADMIN — ACETAMINOPHEN 975 MG: 650 SUSPENSION ORAL at 17:26

## 2023-07-31 RX ADMIN — METRONIDAZOLE 500 MG: 500 TABLET ORAL at 23:04

## 2023-07-31 RX ADMIN — CEFEPIME HYDROCHLORIDE 1000 MG: 1 INJECTION, SOLUTION INTRAVENOUS at 23:02

## 2023-07-31 RX ADMIN — LEVALBUTEROL HYDROCHLORIDE 1.25 MG: 1.25 SOLUTION RESPIRATORY (INHALATION) at 14:22

## 2023-07-31 RX ADMIN — TAMSULOSIN HYDROCHLORIDE 0.4 MG: 0.4 CAPSULE ORAL at 15:37

## 2023-07-31 RX ADMIN — APIXABAN 2.5 MG: 2.5 TABLET, FILM COATED ORAL at 17:26

## 2023-07-31 RX ADMIN — LEVALBUTEROL HYDROCHLORIDE 1.25 MG: 1.25 SOLUTION RESPIRATORY (INHALATION) at 01:00

## 2023-07-31 RX ADMIN — ACETAMINOPHEN 975 MG: 650 SUSPENSION ORAL at 08:18

## 2023-07-31 NOTE — PHYSICAL THERAPY NOTE
Attempted PT treatment however pt was out of his room for testing. Will follow.   Mary Current PT  [id number]     07/31/23 1030   Note Type   Note Type Cancelled Session   Cancel Reasons Patient off floor/test

## 2023-07-31 NOTE — PROCEDURES
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 (HCC)    Recent pericardial effusion    Chronic combined systolic and diastolic congestive heart failure (HCC)    Atrial fibrillation with slow ventricular response (HCC)    Acute kidney injury superimposed on chronic kidney disease ([address])    Anemia    Hyponatremia    Recent empyema of lung with persistent locuted R hydropneumothorax    Goals of care, counseling/discussion    Sepsis [facility])      Past Medical History  Past Medical History:   Diagnosis Date   • Atrial fibrillation ([address])    • Chronic kidney disease    • Coronary artery disease    • Diabetes mellitus ([address])    • Hyperlipidemia    • Hypertension        Past Surgical History  Past Surgical History:   Procedure Laterality Date   • CARDIAC CATHETERIZATION N/A 6/30/2023    Procedure: Cardiac pericardiocentesis; Surgeon: Munira Obregon DO;  Location: [facility]; Service: Cardiology   • CARDIAC CATHETERIZATION N/A 6/30/2023    Procedure: Cardiac RHC; Surgeon: Munira Obregon DO;  Location: [facility]; Service: Cardiology   • EYE SURGERY     • IR CHEST TUBE PLACEMENT  6/24/2023   • IR CHEST TUBE PLACEMENT  7/28/2023   • SKIN CANCER EXCISION     • TONSILLECTOMY           General Information  Pt was admitted 7/27 with DX of recetn empyema, aspiration pna, chronic CHF KARINA on CKD. A VFSS was recommended to assess oropharyngeal stage swallowing skills at this time. Pt was viewed in lateral position and assessed with nectar thick liquid, thin liquid, honey thick liquid (x1 tsp), puree, solid food (Zakia Doone cookie) aand a13mm pill with thin liquid.       Oral stage:  Pt presented with moderate oral stage dysphagia this am    Mastication was moderately prolonged. Bolus formation and transfer were also mod prolonged with food and all thick liquids,    Oral control was inconsistent with episodes of significant labial leakage with liquids by tsp/cup (improved w/straw) and inconsistent premature spillage to the pyriform sinuses noted with nectar thick> thin liquid. Pharyngeal stage:  Pt presented with mild pharyngeal dysphagia. Swallowing initiation varied (sufficiently prompt with food and some boluses of thin liquid but occasions of spill and delay noted with nectar and thin liquid  Laryngeal rise and anterior hyoid excursion appeared adequate. AIrway entrance closure appeared adequate  Tongue base retraction appeared to be of adequate strength. Pharyngeal constriction suspected or appeared adequate. Mild cricopharyngeal hypertrophy noted/suspected. Management of food/liquid/barium tablet follows: No laryngeal penetration, aspiration or significant pharyngeal residue noted with the limited materials administered today (minimal residue noted at superior border of UES or cricopharyngeus. However, reduced oral control and episodic delay in swallow initiaition equate to risk (as does pt's lethargy)    Strategies and Efficacy: pt unable to use prep set, spontaneously used neck flexed     Esophageal stage:  Brief view of esophagus was completed at end of testing. No significant stasis of material identified. .    Assessment Summary:    Pt presented as lethargic with moderate oral but only mild pharyngeal dysphagia with no aspiration but risk present given extent of lethargy  Note: Images are available for review in PACS as desired. LEVEL 4: Swallowing is safe, but usually requires moderate cues to use compensatory strategies, and/or the individual has moderate diet restrictions.                   Thin        NTL           HTL          PTL  Reg  Lvl 7/6  5 or <  4 or <  4   NDD3   5  4  4  4 NDD2  4  4  3  3  NDD1  4  4  3  3    Recommendations:   Recommended Diet:  puree/level 1 diet and nectar thick liquids   Recommended Form of Medications: crushed with puree   Aspiration precautions and compensatory swallowing strategies: upright posture, only feed when fully alert and slow rate of feeding  SLP Dysphagia therapy recommended: yes, continue diagnostic tx and established POC. Results Reviewed with: patient and nursing staff.      Lamont Montana [address] [place] [address] [phone] no

## 2023-07-31 NOTE — PLAN OF CARE
Problem: INFECTION - ADULT  Goal: Absence or prevention of progression during hospitalization  Description: INTERVENTIONS:  - Assess and monitor for signs and symptoms of infection  - Monitor lab/diagnostic results  - Monitor all insertion sites, i.e. indwelling lines, tubes, and drains  - Monitor endotracheal if appropriate and nasal secretions for changes in amount and color  - [place] appropriate cooling/warming therapies per order  - Administer medications as ordered  - Instruct and encourage patient and family to use good hand hygiene technique  - Identify and instruct in appropriate isolation precautions for identified infection/condition  Outcome: Progressing  Goal: Absence of fever/infection during neutropenic period  Description: INTERVENTIONS:  - Monitor WBC    Outcome: Progressing     Problem: METABOLIC, FLUID AND ELECTROLYTES - ADULT  Goal: Electrolytes maintained within normal limits  Description: INTERVENTIONS:  - Monitor labs and assess patient for signs and symptoms of electrolyte imbalances  - Administer electrolyte replacement as ordered  - Monitor response to electrolyte replacements, including repeat lab results as appropriate  - Instruct patient on fluid and nutrition as appropriate  Outcome: Progressing  Goal: Fluid balance maintained  Description: INTERVENTIONS:  - Monitor labs   - Monitor I/O and WT  - Instruct patient on fluid and nutrition as appropriate  - Assess for signs & symptoms of volume excess or deficit  Outcome: Progressing     Problem: RESPIRATORY - ADULT  Goal: Achieves optimal ventilation and oxygenation  Description: INTERVENTIONS:  - Assess for changes in respiratory status  - Assess for changes in mentation and behavior  - Position to facilitate oxygenation and minimize respiratory effort  - Oxygen administered by appropriate delivery if ordered  - Initiate smoking cessation education as indicated  - Encourage broncho-pulmonary hygiene including cough, deep breathe, Incentive Spirometry  - Assess the need for suctioning and aspirate as needed  - Assess and instruct to report SOB or any respiratory difficulty  - Respiratory Therapy support as indicated  Outcome: Progressing     Problem: Nutrition/Hydration-ADULT  Goal: Nutrient/Hydration intake appropriate for improving, restoring or maintaining nutritional needs  Description: Monitor and assess patient's nutrition/hydration status for malnutrition. Collaborate with interdisciplinary team and initiate plan and interventions as ordered. Monitor patient's weight and dietary intake as ordered or per policy. Utilize nutrition screening tool and intervene as necessary. Determine patient's food preferences and provide high-protein, high-caloric foods as appropriate.      INTERVENTIONS:  - Monitor oral intake, urinary output, labs, and treatment plans  - Assess nutrition and hydration status and recommend course of action  - Evaluate amount of meals eaten  - Assist patient with eating if necessary   - Allow adequate time for meals  - Recommend/ encourage appropriate diets, oral nutritional supplements, and vitamin/mineral supplements  - Order, calculate, and assess calorie counts as needed  - Recommend, monitor, and adjust tube feedings and TPN/PPN based on assessed needs  - Assess need for intravenous fluids  - Provide specific nutrition/hydration education as appropriate  - Include patient/family/caregiver in decisions related to nutrition  Outcome: Progressing

## 2023-07-31 NOTE — PROGRESS NOTES
[address]  Progress Note  Name: Loren Rasmussen  MRN: [id number]  Unit/Bed#: 2 [address] Date of Admission: 7/27/2023   Date of Service: 7/31/2023 I Hospital Day: 4    Assessment/Plan    * Aspiration pneumonia [facility])  Assessment & Plan  History of atrial fibrillation congestive heart failure CKD 4 diabetes and recent pericardial effusion presents to the hospital with shortness of breath found to have recurrent right exudative pleural effusion/parapneumonic effusion  · Sepsis on admission secondary to aspiration pneumonia and effusion  · Currently remains on cefepime and metronidazole  · Chest tube in place being managed by pulmonology    Anemia  Assessment & Plan  · Anemia without evidence of blood loss. Likely chronic disease. Results from last 7 days   Lab Units 07/31/23 0453 07/30/23  0537 07/29/23  0631 07/28/23  0528   HEMOGLOBIN g/dL 8.0* 9.0* 8.7* 8.6*       Acute kidney injury superimposed on chronic kidney disease ([address])  Assessment & Plan  · Sees nephrology as an outpatient baseline creatinine closer to 2.3.    · Holding torsemide secondary to poor intake. Results from last 7 days   Lab Units 07/31/23  0453 07/30/23  0537 07/29/23  0631 07/28/23  0528 07/27/23  1648   BUN mg/dL 32* 32* 34* 36* 36*   CREATININE mg/dL 2.29* 2.38* 2.68* 2.59* 2.80*   EGFR ml/min/1.73sq m 25 24 20 21 19       Atrial fibrillation with slow ventricular response (HCC)  Assessment & Plan  · Intrinsically rate controlled. Continue eliquis    Chronic combined systolic and diastolic congestive heart failure (HCC)  Assessment & Plan  · Combined CHF currently torsemide on hold due to poor intake    Wt Readings from Last 3 Encounters:   07/27/23 68.1 kg (150 lb 1.6 oz)   07/10/23 64.9 kg (143 lb 1.3 oz)   06/28/23 74.5 kg (164 lb 3.9 oz)       Recent pericardial effusion  Assessment & Plan  · Recent pericardiocentesis 6/30 for tamponade.   Recovered    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 ([address])  Assessment & Plan  · Diabetes mellitus prior to admission on januvia and glimepiride  · Morning glucose has remained stable without need for sliding scale    Results from last 7 days   Lab Units 07/31/23  0453 07/30/23  0537 07/29/23  0631 07/28/23  0528   GLUCOSE RANDOM mg/dL 116 92 92 86       VTE Pharmacologic Prophylaxis: VTE Score: 7 High Risk (Score >/= 5) - Pharmacological DVT Prophylaxis Ordered: apixaban (Eliquis). Sequential Compression Devices Ordered. Patient Centered Rounds: I have performed bedside rounds with nursing staff today. Discussions with Specialists or Other Care Team Provider: Case management speech pathologist    Education and Discussions with Family / Patient: Updated  (son) via phone. Time Spent for Care: This time was spent on one or more of the following: performing physical exam; counseling and coordination of care; obtaining or reviewing history; documenting in the medical record; reviewing/ordering tests, medications or procedures; communicating with other healthcare professionals and discussing with patient's family/caregivers. Current Length of Stay: 4 day(s)  Current Patient Status: Inpatient   Certification Statement: The patient will continue to require additional inpatient hospital stay due to Chest tube management and IV antibiotics  Discharge Plan: Anticipate discharge in 48-72 hrs to rehab facility. Code Status: Level 3 - DNAR and DNI      Subjective:   Patient seen and examined. No new complaints. Went for a video barium swallow today. Objective:   Vitals: Blood pressure 141/52, pulse 87, temperature 98.2 °F (36.8 °C), resp. rate 17, height 5' 9" (1.753 m), weight 68.1 kg (150 lb 1.6 oz), SpO2 98 %. Intake/Output Summary (Last 24 hours) at 7/31/2023 1535  Last data filed at 7/31/2023 4251  Gross per 24 hour   Intake 340 ml   Output 500 ml   Net -160 ml       Physical Exam  Vitals reviewed. Constitutional:       General: He is not in acute distress. HENT:      Head: Atraumatic. Cardiovascular:      Rate and Rhythm: Regular rhythm. Heart sounds: Normal heart sounds. Pulmonary:      Breath sounds: Decreased breath sounds present. No wheezing. Comments: Chest tube present  Abdominal:      General: Bowel sounds are normal.      Palpations: Abdomen is soft. Tenderness: There is no abdominal tenderness. There is no rebound. Musculoskeletal:         General: No swelling or tenderness. Skin:     General: Skin is warm. Neurological:      General: No focal deficit present. Mental Status: He is alert. Cranial Nerves: No cranial nerve deficit. Psychiatric:         Mood and Affect: Mood normal.       Additional Data:   Labs:  Results from last 7 days   Lab Units 07/31/23 0453 07/30/23 0537 07/29/23 0631 07/28/23 0528 07/27/23  1648   WBC Thousand/uL 4.70 4.40 3.66*   < > 4.52   HEMOGLOBIN g/dL 8.0* 9.0* 8.7*   < > 9.3*   PLATELETS Thousands/uL 112* 113* 125*   < > 127*   MCV fL 104* 103* 102*   < > 102*   INR   --   --   --   --  1.44*    < > = values in this interval not displayed. Results from last 7 days   Lab Units 07/31/23 0453 07/30/23 0537 07/29/23 0631 07/28/23 0528 07/27/23  1648   SODIUM mmol/L 133* 135 138   < > 133*   POTASSIUM mmol/L 4.3 3.8 3.8   < > 4.7   CHLORIDE mmol/L 101 102 103   < > 96   CO2 mmol/L 27 27 30   < > 31   ANION GAP mmol/L 5 6 5   < > 6   BUN mg/dL 32* 32* 34*   < > 36*   CREATININE mg/dL 2.29* 2.38* 2.68*   < > 2.80*   CALCIUM mg/dL 7.6* 8.0* 8.0*   < > 8.5   ALBUMIN g/dL 2.7*  --   --   --  3.1*   TOTAL BILIRUBIN mg/dL 0.40  --   --   --  0.57   ALK PHOS U/L 110*  --   --   --  134*   ALT U/L 13  --   --   --  21   AST U/L 13  --   --   --  20   EGFR ml/min/1.73sq m 25 24 20   < > 19   GLUCOSE RANDOM mg/dL 116 92 92   < > 174*    < > = values in this interval not displayed.                       Results from last 7 days   Lab Units 07/28/23  0528 07/27/23  2203 07/27/23  1946 07/27/23  1648   LACTIC ACID mmol/L  --   --  1.3 2.8*   PROCALCITONIN ng/ml 0.19   < >  --   --     < > = values in this interval not displayed. * I Have Reviewed All Lab Data Listed Above. Cultures:   Results from last 7 days   Lab Units 07/28/23  1633 07/27/23  1722 07/27/23  1659 07/27/23  1654   BLOOD CULTURE   --   --  No Growth at 72 hrs. No Growth at 72 hrs. GRAM STAIN RESULT  No Polys or Bacteria seen  --   --   --    URINE CULTURE   --  10,000-19,000 cfu/ml  --   --    BODY FLUID CULTURE, STERILE  No growth  --   --   --                  Lines/Drains:  Invasive Devices     Peripheral Intravenous Line  Duration           Peripheral IV 07/29/23 Dorsal (posterior); Right Forearm 2 days          Drain  Duration           External Urinary Catheter Small 21 days    Chest Tube 1 Right Pleural 10 Fr. 2 days    External Urinary Catheter 1 day              Telemetry:      Imaging:  Imaging Reports Reviewed Today Include:   IR chest tube placement    Result Date: 7/28/2023  Impression: Impression: Successful placement of 10 [place] all-purpose drainage catheter into the right pleural space under ultrasound guidance, yielding 150 cc of prashanth pleural fluid. Workstation performed: [id number]     CT chest wo contrast    Result Date: 7/27/2023  Impression: New occlusion of the bronchus intermedius and the right middle and right lower lobe bronchi with new patchy consolidation in the dependent right upper lobe, worsening consolidation in the right middle lobe, and near complete consolidation of the right lower lobe superimposed on rounded atelectasis, likely aspiration. Redemonstration of moderate loculated right hydropneumothorax and right pleural thickening. Increase in small left pleural effusion.  Workstation performed: [id number]       Scheduled Meds:  Current Facility-Administered Medications   Medication Dose Route Frequency Provider Last Rate • acetaminophen  975 mg Oral Q8H Marcus Osman MD     • allopurinol  100 mg Oral BID Garth Jasmine DO     • apixaban  2.5 mg Oral BID Garth Jasmine DO     • atorvastatin  40 mg Oral Daily With Courtney Gonzalez DO     • cefepime  1,000 mg Intravenous Q12H Garth Jasmine DO 1,000 mg (07/31/23 1050)   • docusate sodium  100 mg Oral BID PRN Marcus Osman MD     • HYDROmorphone  0.2 mg Intravenous Q4H PRN Marcus Osman MD     • levalbuterol  1.25 mg Nebulization Q6H SUSANA Nance     • metroNIDAZOLE  500 mg Oral [facility] Kenney Gonzalez DO     • ondansetron  4 mg Intravenous Q6H PRN Garth Jasmine DO     • oxyCODONE  2.5 mg Oral Q4H PRN Marcus Osman MD     • tamsulosin  0.4 mg Oral Daily With Dinner Garth Jasmine DO     • timolol  1 drop Both Eyes Daily Kenney Gonzalez DO         Today, Patient Was Seen By: Opal Gutierres DO    ** Please Note: Dictation voice to text software may have been used in the creation of this document.  **

## 2023-08-01 ENCOUNTER — APPOINTMENT (INPATIENT)
Dept: RADIOLOGY | Facility: HOSPITAL | Age: 83
DRG: 871 | End: 2023-08-01
Payer: MEDICARE

## 2023-08-01 LAB
ALBUMIN SERPL BCP-MCNC: 2.7 G/DL (ref 3.5–5)
ALP SERPL-CCNC: 107 U/L (ref 34–104)
ALT SERPL W P-5'-P-CCNC: 10 U/L (ref 7–52)
ANION GAP SERPL CALCULATED.3IONS-SCNC: 6 MMOL/L
AST SERPL W P-5'-P-CCNC: 12 U/L (ref 13–39)
BACTERIA BLD CULT: NORMAL
BACTERIA BLD CULT: NORMAL
BILIRUB SERPL-MCNC: 0.45 MG/DL (ref 0.2–1)
BUN SERPL-MCNC: 32 MG/DL (ref 5–25)
CALCIUM ALBUM COR SERPL-MCNC: 8.6 MG/DL (ref 8.3–10.1)
CALCIUM SERPL-MCNC: 7.6 MG/DL (ref 8.4–10.2)
CHLORIDE SERPL-SCNC: 101 MMOL/L (ref 96–108)
CO2 SERPL-SCNC: 25 MMOL/L (ref 21–32)
CREAT SERPL-MCNC: 2.28 MG/DL (ref 0.6–1.3)
ERYTHROCYTE [DISTWIDTH] IN BLOOD BY AUTOMATED COUNT: 15.5 % (ref 11.6–15.1)
GFR SERPL CREATININE-BSD FRML MDRD: 25 ML/MIN/1.73SQ M
GLUCOSE SERPL-MCNC: 137 MG/DL (ref 65–140)
HCT VFR BLD AUTO: 26.4 % (ref 36.5–49.3)
HGB BLD-MCNC: 8.3 G/DL (ref 12–17)
MCH RBC QN AUTO: 32.9 PG (ref 26.8–34.3)
MCHC RBC AUTO-ENTMCNC: 31.4 G/DL (ref 31.4–37.4)
MCV RBC AUTO: 105 FL (ref 82–98)
MYCOBACTERIUM SPEC CULT: NORMAL
PLATELET # BLD AUTO: 106 THOUSANDS/UL (ref 149–390)
PMV BLD AUTO: 9.6 FL (ref 8.9–12.7)
POTASSIUM SERPL-SCNC: 4.1 MMOL/L (ref 3.5–5.3)
PROCALCITONIN SERPL-MCNC: 0.12 NG/ML
PROT SERPL-MCNC: 4.9 G/DL (ref 6.4–8.4)
RBC # BLD AUTO: 2.52 MILLION/UL (ref 3.88–5.62)
RHODAMINE-AURAMINE STN SPEC: NORMAL
SODIUM SERPL-SCNC: 132 MMOL/L (ref 135–147)
WBC # BLD AUTO: 4.7 THOUSAND/UL (ref 4.31–10.16)

## 2023-08-01 PROCEDURE — 94640 AIRWAY INHALATION TREATMENT: CPT

## 2023-08-01 PROCEDURE — 85027 COMPLETE CBC AUTOMATED: CPT | Performed by: INTERNAL MEDICINE

## 2023-08-01 PROCEDURE — 71045 X-RAY EXAM CHEST 1 VIEW: CPT

## 2023-08-01 PROCEDURE — 94760 N-INVAS EAR/PLS OXIMETRY 1: CPT

## 2023-08-01 PROCEDURE — 84145 PROCALCITONIN (PCT): CPT | Performed by: INTERNAL MEDICINE

## 2023-08-01 PROCEDURE — 99232 SBSQ HOSP IP/OBS MODERATE 35: CPT | Performed by: INTERNAL MEDICINE

## 2023-08-01 PROCEDURE — 94660 CPAP INITIATION&MGMT: CPT

## 2023-08-01 PROCEDURE — 80053 COMPREHEN METABOLIC PANEL: CPT | Performed by: INTERNAL MEDICINE

## 2023-08-01 RX ORDER — TORSEMIDE 20 MG/1
20 TABLET ORAL DAILY
Status: DISCONTINUED | OUTPATIENT
Start: 2023-08-01 | End: 2023-08-03

## 2023-08-01 RX ADMIN — ALLOPURINOL 100 MG: 100 TABLET ORAL at 08:58

## 2023-08-01 RX ADMIN — METRONIDAZOLE 500 MG: 500 TABLET ORAL at 22:09

## 2023-08-01 RX ADMIN — LEVALBUTEROL HYDROCHLORIDE 1.25 MG: 1.25 SOLUTION RESPIRATORY (INHALATION) at 20:21

## 2023-08-01 RX ADMIN — LEVALBUTEROL HYDROCHLORIDE 1.25 MG: 1.25 SOLUTION RESPIRATORY (INHALATION) at 01:49

## 2023-08-01 RX ADMIN — LEVALBUTEROL HYDROCHLORIDE 1.25 MG: 1.25 SOLUTION RESPIRATORY (INHALATION) at 07:04

## 2023-08-01 RX ADMIN — ACETAMINOPHEN 975 MG: 650 SUSPENSION ORAL at 16:31

## 2023-08-01 RX ADMIN — ACETAMINOPHEN 975 MG: 650 SUSPENSION ORAL at 08:58

## 2023-08-01 RX ADMIN — APIXABAN 2.5 MG: 2.5 TABLET, FILM COATED ORAL at 17:11

## 2023-08-01 RX ADMIN — METRONIDAZOLE 500 MG: 500 TABLET ORAL at 13:49

## 2023-08-01 RX ADMIN — ACETAMINOPHEN 975 MG: 650 SUSPENSION ORAL at 03:10

## 2023-08-01 RX ADMIN — TIMOLOL MALEATE 1 DROP: 5 SOLUTION/ DROPS OPHTHALMIC at 08:58

## 2023-08-01 RX ADMIN — CEFEPIME HYDROCHLORIDE 1000 MG: 1 INJECTION, SOLUTION INTRAVENOUS at 22:09

## 2023-08-01 RX ADMIN — METRONIDAZOLE 500 MG: 500 TABLET ORAL at 06:19

## 2023-08-01 RX ADMIN — TAMSULOSIN HYDROCHLORIDE 0.4 MG: 0.4 CAPSULE ORAL at 16:31

## 2023-08-01 RX ADMIN — TORSEMIDE 20 MG: 20 TABLET ORAL at 13:49

## 2023-08-01 RX ADMIN — LEVALBUTEROL HYDROCHLORIDE 1.25 MG: 1.25 SOLUTION RESPIRATORY (INHALATION) at 13:27

## 2023-08-01 RX ADMIN — ATORVASTATIN CALCIUM 40 MG: 40 TABLET, FILM COATED ORAL at 16:30

## 2023-08-01 RX ADMIN — CEFEPIME HYDROCHLORIDE 1000 MG: 1 INJECTION, SOLUTION INTRAVENOUS at 09:11

## 2023-08-01 RX ADMIN — APIXABAN 2.5 MG: 2.5 TABLET, FILM COATED ORAL at 08:58

## 2023-08-01 RX ADMIN — ALLOPURINOL 100 MG: 100 TABLET ORAL at 17:11

## 2023-08-01 NOTE — PLAN OF CARE
Problem: INFECTION - ADULT  Goal: Absence or prevention of progression during hospitalization  Description: INTERVENTIONS:  - Assess and monitor for signs and symptoms of infection  - Monitor lab/diagnostic results  - Monitor all insertion sites, i.e. indwelling lines, tubes, and drains  - Monitor endotracheal if appropriate and nasal secretions for changes in amount and color  - [place] appropriate cooling/warming therapies per order  - Administer medications as ordered  - Instruct and encourage patient and family to use good hand hygiene technique  - Identify and instruct in appropriate isolation precautions for identified infection/condition  Outcome: Progressing  Goal: Absence of fever/infection during neutropenic period  Description: INTERVENTIONS:  - Monitor WBC    Outcome: Progressing     Problem: METABOLIC, FLUID AND ELECTROLYTES - ADULT  Goal: Electrolytes maintained within normal limits  Description: INTERVENTIONS:  - Monitor labs and assess patient for signs and symptoms of electrolyte imbalances  - Administer electrolyte replacement as ordered  - Monitor response to electrolyte replacements, including repeat lab results as appropriate  - Instruct patient on fluid and nutrition as appropriate  Outcome: Progressing  Goal: Fluid balance maintained  Description: INTERVENTIONS:  - Monitor labs   - Monitor I/O and WT  - Instruct patient on fluid and nutrition as appropriate  - Assess for signs & symptoms of volume excess or deficit  Outcome: Progressing     Problem: RESPIRATORY - ADULT  Goal: Achieves optimal ventilation and oxygenation  Description: INTERVENTIONS:  - Assess for changes in respiratory status  - Assess for changes in mentation and behavior  - Position to facilitate oxygenation and minimize respiratory effort  - Oxygen administered by appropriate delivery if ordered  - Initiate smoking cessation education as indicated  - Encourage broncho-pulmonary hygiene including cough, deep breathe, Incentive Spirometry  - Assess the need for suctioning and aspirate as needed  - Assess and instruct to report SOB or any respiratory difficulty  - Respiratory Therapy support as indicated  Outcome: Progressing     Problem: Nutrition/Hydration-ADULT  Goal: Nutrient/Hydration intake appropriate for improving, restoring or maintaining nutritional needs  Description: Monitor and assess patient's nutrition/hydration status for malnutrition. Collaborate with interdisciplinary team and initiate plan and interventions as ordered. Monitor patient's weight and dietary intake as ordered or per policy. Utilize nutrition screening tool and intervene as necessary. Determine patient's food preferences and provide high-protein, high-caloric foods as appropriate.      INTERVENTIONS:  - Monitor oral intake, urinary output, labs, and treatment plans  - Assess nutrition and hydration status and recommend course of action  - Evaluate amount of meals eaten  - Assist patient with eating if necessary   - Allow adequate time for meals  - Recommend/ encourage appropriate diets, oral nutritional supplements, and vitamin/mineral supplements  - Order, calculate, and assess calorie counts as needed  - Recommend, monitor, and adjust tube feedings and TPN/PPN based on assessed needs  - Assess need for intravenous fluids  - Provide specific nutrition/hydration education as appropriate  - Include patient/family/caregiver in decisions related to nutrition  Outcome: Progressing

## 2023-08-01 NOTE — PROGRESS NOTES
[address]  Progress Note  Name: Christa Dash  MRN: [id number]  Unit/Bed#: 2 [place] [address] [address]  Date of Admission: 7/27/2023   Date of Service: 8/1/2023 I Hospital Day: 5    Assessment/Plan   * Aspiration pneumonia [facility])  Assessment & Plan  History of atrial fibrillation congestive heart failure CKD 4 diabetes and recent pericardial effusion presents to the hospital with shortness of breath found to have recurrent right exudative pleural effusion/parapneumonic effusion  · Sepsis on admission secondary to aspiration pneumonia and effusion  · Currently remains on cefepime and metronidazole: Antibiotic day 6  · Chest tube in place being managed by pulmonology. Small pneumothorax repeat imaging today. · Dysphagia: Status post video barium swallow continue current puréed and nectar thickened liquids    Anemia  Assessment & Plan  · Anemia without evidence of blood loss. Likely chronic disease. Results from last 7 days   Lab Units 08/01/23  0501 07/31/23  0453 07/30/23  0537 07/29/23  0631   HEMOGLOBIN g/dL 8.3* 8.0* 9.0* 8.7*       Acute kidney injury superimposed on chronic kidney disease ([address])  Assessment & Plan  · Sees nephrology as an outpatient baseline creatinine closer to 2.3.    · Currently holding holding torsemide secondary to poor intake; will restart at lower dosing    Results from last 7 days   Lab Units 08/01/23  0501 07/31/23  0453 07/30/23  0537 07/29/23  0631 07/28/23  0528 07/27/23  1648   BUN mg/dL 32* 32* 32* 34* 36* 36*   CREATININE mg/dL 2.28* 2.29* 2.38* 2.68* 2.59* 2.80*   EGFR ml/min/1.73sq m 25 25 24 20 21 19       Atrial fibrillation with slow ventricular response (HCC)  Assessment & Plan  · Intrinsically rate controlled.   Continue eliquis    Chronic combined systolic and diastolic congestive heart failure (HCC)  Assessment & Plan  · Combined CHF currently torsemide on hold due to poor intake    Wt Readings from Last 3 Encounters:   07/27/23 68.1 kg (150 lb 1.6 oz) 07/10/23 64.9 kg (143 lb 1.3 oz)   06/28/23 74.5 kg (164 lb 3.9 oz)       Recent pericardial effusion  Assessment & Plan  · Recent pericardiocentesis 6/30 for tamponade. Recovered    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 ([address])  Assessment & Plan  · Diabetes mellitus prior to admission on januvia and glimepiride  · Morning glucose has remained stable without need for sliding scale    Results from last 7 days   Lab Units 08/01/23  0501 07/31/23  0453 07/30/23  0537 07/29/23  0631   GLUCOSE RANDOM mg/dL 137 116 92 92     VTE Pharmacologic Prophylaxis: VTE Score: 7 High Risk (Score >/= 5) - Pharmacological DVT Prophylaxis Ordered: apixaban (Eliquis). Sequential Compression Devices Ordered. Patient Centered Rounds: I have performed bedside rounds with nursing staff today. Discussions with Specialists or Other Care Team Provider: Case management and pulmonology    Education and Discussions with Family / Patient: Updated  (Brother-in-law) at bedside. Time Spent for Care: This time was spent on one or more of the following: performing physical exam; counseling and coordination of care; obtaining or reviewing history; documenting in the medical record; reviewing/ordering tests, medications or procedures; communicating with other healthcare professionals and discussing with patient's family/caregivers. Current Length of Stay: 5 day(s)  Current Patient Status: Inpatient   Certification Statement: The patient will continue to require additional inpatient hospital stay due to Chest tube management pneumothorax and need for placement  Discharge Plan: Anticipate discharge in 48-72 hrs to Should go to rehab facility but son was hoping for home instead    Code Status: Level 3 - DNAR and DNI      Subjective:   Patient seen and examined. No new complaints. Eating lunch without difficulty.     Objective:   Vitals: Blood pressure 121/66, pulse 84, temperature 98.3 °F (36.8 °C), resp. rate 16, height 5' 9" (1.753 m), weight 68.1 kg (150 lb 1.6 oz), SpO2 97 %. Intake/Output Summary (Last 24 hours) at 8/1/2023 1337  Last data filed at 8/1/2023 0701  Gross per 24 hour   Intake --   Output 405 ml   Net -405 ml       Physical Exam  Vitals reviewed. Constitutional:       General: He is not in acute distress. HENT:      Head: Atraumatic. Cardiovascular:      Rate and Rhythm: Regular rhythm. Pulmonary:      Effort: Pulmonary effort is normal.      Breath sounds: Decreased breath sounds present. No wheezing. Comments: Chest tube present  Abdominal:      General: Bowel sounds are normal.      Palpations: Abdomen is soft. Tenderness: There is no abdominal tenderness. There is no rebound. Musculoskeletal:         General: No swelling or tenderness. Skin:     General: Skin is warm and dry. Neurological:      General: No focal deficit present. Mental Status: He is alert. Cranial Nerves: No cranial nerve deficit. Additional Data:   Labs:  Results from last 7 days   Lab Units 08/01/23  0501 07/31/23 0453 07/30/23 0537 07/28/23  0528 07/27/23  1648   WBC Thousand/uL 4.70 4.70 4.40   < > 4.52   HEMOGLOBIN g/dL 8.3* 8.0* 9.0*   < > 9.3*   PLATELETS Thousands/uL 106* 112* 113*   < > 127*   MCV fL 105* 104* 103*   < > 102*   INR   --   --   --   --  1.44*    < > = values in this interval not displayed.      Results from last 7 days   Lab Units 08/01/23  0501 07/31/23  0453 07/30/23  0537 07/28/23  0528 07/27/23  1648   SODIUM mmol/L 132* 133* 135   < > 133*   POTASSIUM mmol/L 4.1 4.3 3.8   < > 4.7   CHLORIDE mmol/L 101 101 102   < > 96   CO2 mmol/L 25 27 27   < > 31   ANION GAP mmol/L 6 5 6   < > 6   BUN mg/dL 32* 32* 32*   < > 36*   CREATININE mg/dL 2.28* 2.29* 2.38*   < > 2.80*   CALCIUM mg/dL 7.6* 7.6* 8.0*   < > 8.5   ALBUMIN g/dL 2.7* 2.7*  --   --  3.1*   TOTAL BILIRUBIN mg/dL 0.45 0.40  --   --  0.57   ALK PHOS U/L 107* 110*  --   --  134*   ALT U/L 10 13  --   --  21   AST U/L 12* 13  --   --  20   EGFR ml/min/1.73sq m 25 25 24   < > 19   GLUCOSE RANDOM mg/dL 137 116 92   < > 174*    < > = values in this interval not displayed. Results from last 7 days   Lab Units 08/01/23  0501 07/27/23  2203 07/27/23  1946 07/27/23  1648   LACTIC ACID mmol/L  --   --  1.3 2.8*   PROCALCITONIN ng/ml 0.12   < >  --   --     < > = values in this interval not displayed. * I Have Reviewed All Lab Data Listed Above. Cultures:   Results from last 7 days   Lab Units 07/28/23  1633 07/27/23  1722 07/27/23  1659 07/27/23  1654   BLOOD CULTURE   --   --  No Growth After 4 Days. No Growth After 4 Days. GRAM STAIN RESULT  No Polys or Bacteria seen  --   --   --    URINE CULTURE   --  10,000-19,000 cfu/ml  --   --    BODY FLUID CULTURE, STERILE  No growth  --   --   --                  Lines/Drains:  Invasive Devices     Peripheral Intravenous Line  Duration           Peripheral IV 07/29/23 Dorsal (posterior); Right Forearm 2 days          Drain  Duration           External Urinary Catheter Small 22 days    Chest Tube 1 Right Pleural 10 Fr. 3 days    External Urinary Catheter 2 days              Telemetry:      Imaging:  Imaging Reports Reviewed Today Include:   XR chest portable    Result Date: 7/31/2023  Impression: Right-sided chest tube with a small right basilar pneumothorax with surrounding airspace opacification. The study was marked in [facility] for immediate notification. Workstation performed: [id number]     IR chest tube placement    Result Date: 7/28/2023  Impression: Impression: Successful placement of 10 [place] all-purpose drainage catheter into the right pleural space under ultrasound guidance, yielding 150 cc of prashanth pleural fluid.  Workstation performed: [id number]     CT chest wo contrast    Result Date: 7/27/2023  Impression: New occlusion of the bronchus intermedius and the right middle and right lower lobe bronchi with new patchy consolidation in the dependent right upper lobe, worsening consolidation in the right middle lobe, and near complete consolidation of the right lower lobe superimposed on rounded atelectasis, likely aspiration. Redemonstration of moderate loculated right hydropneumothorax and right pleural thickening. Increase in small left pleural effusion. Workstation performed: [id number]       Scheduled Meds:  Current Facility-Administered Medications   Medication Dose Route Frequency Provider Last Rate   • acetaminophen  975 mg Oral Q8H Megan Neal MD     • allopurinol  100 mg Oral BID Byers Silver, DO     • apixaban  2.5 mg Oral BID Byers Silver, DO     • atorvastatin  40 mg Oral Daily With PepCatalina Hope DO     • cefepime  1,000 mg Intravenous Q12H Byers Silver, DO 1,000 mg (08/01/23 0911)   • docusate sodium  100 mg Oral BID PRN Megan Neal MD     • HYDROmorphone  0.2 mg Intravenous Q4H PRN Megan Neal MD     • levalbuterol  1.25 mg Nebulization Q6H Loreli Friday, CRNP     • metroNIDAZOLE  500 mg Oral [facility] Kenney Hope DO     • ondansetron  4 mg Intravenous Q6H PRN Byers Silver, DO     • oxyCODONE  2.5 mg Oral Q4H PRN Megan Neal MD     • tamsulosin  0.4 mg Oral Daily With Dinner Byers Silver, DO     • timolol  1 drop Both Eyes Daily Kenney Hope DO         Today, Patient Was Seen By: Magdalene Kehr, DO    ** Please Note: Dictation voice to text software may have been used in the creation of this document.  **

## 2023-08-01 NOTE — PROGRESS NOTES
Progress Note - Pulmonary   Scottie Goode 78 y.o. male MRN: [id number]  Unit/Bed#: [address] [place] [address] Encounter: [id number]    Assessment:  Right exudative pleural effusion: CKD on diuretics   Parapneumonic effusion  Status post chest tube placed: still draining and has small pneumothorax     Plan:  Cont chest tube drainage  IS  Cont diuresis as per renal   Aspiration precaution        Subjective:   Pt seen and examined at bedside. Pt is afebrile. He denies any new complaints. Objective:     Vitals: Blood pressure 119/67, pulse 58, temperature 98.3 °F (36.8 °C), resp. rate 16, height 5' 9" (1.753 m), weight 68.1 kg (150 lb 1.6 oz), SpO2 98 %. ,Body mass index is 22.17 kg/m². Intake/Output Summary (Last 24 hours) at 8/1/2023 1356  Last data filed at 8/1/2023 0701  Gross per 24 hour   Intake --   Output 405 ml   Net -405 ml       Invasive Devices     Peripheral Intravenous Line  Duration           Peripheral IV 07/29/23 Dorsal (posterior); Right Forearm 2 days          Drain  Duration           External Urinary Catheter Small 22 days    Chest Tube 1 Right Pleural 10 Fr. 3 days    External Urinary Catheter 2 days                Physical Exam:     General: AAOX3  HEENT: atrumatic head  Lungs: decreased breath sound at the right lung base, no rales or wheezing   CVS: S1S2+, no m/r/g  Abdomen: soft, Nd, NT  Ext; no edema  Neuro: AAXO3      Labs: I have personally reviewed pertinent lab results. Imaging and other studies: I have personally reviewed pertinent reports.    and I have personally reviewed pertinent films in PACS

## 2023-08-01 NOTE — ASSESSMENT & PLAN NOTE
· Anemia without evidence of blood loss. Likely chronic disease.     Results from last 7 days   Lab Units 08/01/23  0501 07/31/23  0453 07/30/23  0537 07/29/23  0631   HEMOGLOBIN g/dL 8.3* 8.0* 9.0* 8.7*

## 2023-08-01 NOTE — ASSESSMENT & PLAN NOTE
· Sees nephrology as an outpatient baseline creatinine closer to 2.3.    · Currently holding holding torsemide secondary to poor intake; will restart at lower dosing    Results from last 7 days   Lab Units 08/01/23  0501 07/31/23  0453 07/30/23  0537 07/29/23  0631 07/28/23  0528 07/27/23  1648   BUN mg/dL 32* 32* 32* 34* 36* 36*   CREATININE mg/dL 2.28* 2.29* 2.38* 2.68* 2.59* 2.80*   EGFR ml/min/1.73sq m 25 25 24 20 21 19

## 2023-08-01 NOTE — PLAN OF CARE
VBS Summary:    Pt presented as lethargic with moderate oral but only mild pharyngeal dysphagia with no aspiration but risk present given extent of lethargy  Note: Images are available for review in PACS as desired. LEVEL 4: Swallowing is safe, but usually requires moderate cues to use compensatory strategies, and/or the individual has moderate diet restrictions. Recommended Diet:  puree/level 1 diet and nectar thick liquids   Recommended Form of Medications: crushed with puree   Aspiration precautions and compensatory swallowing strategies: upright posture, only feed when fully alert and slow rate of feeding  SLP Dysphagia therapy recommended: yes, continue diagnostic tx and established POC.

## 2023-08-01 NOTE — ASSESSMENT & PLAN NOTE
· Diabetes mellitus prior to admission on januvia and glimepiride  · Morning glucose has remained stable without need for sliding scale    Results from last 7 days   Lab Units 08/01/23  0501 07/31/23  0453 07/30/23  0537 07/29/23  0631   GLUCOSE RANDOM mg/dL [phone]

## 2023-08-01 NOTE — ASSESSMENT & PLAN NOTE
History of atrial fibrillation congestive heart failure CKD 4 diabetes and recent pericardial effusion presents to the hospital with shortness of breath found to have recurrent right exudative pleural effusion/parapneumonic effusion  · Sepsis on admission secondary to aspiration pneumonia and effusion  · Currently remains on cefepime and metronidazole: Antibiotic day 6  · Chest tube in place being managed by pulmonology. Small pneumothorax repeat imaging today.   · Dysphagia: Status post video barium swallow continue current puréed and nectar thickened liquids

## 2023-08-01 NOTE — PLAN OF CARE
Problem: INFECTION - ADULT  Goal: Absence or prevention of progression during hospitalization  Description: INTERVENTIONS:  - Assess and monitor for signs and symptoms of infection  - Monitor lab/diagnostic results  - Monitor all insertion sites, i.e. indwelling lines, tubes, and drains  - Monitor endotracheal if appropriate and nasal secretions for changes in amount and color  - [place] appropriate cooling/warming therapies per order  - Administer medications as ordered  - Instruct and encourage patient and family to use good hand hygiene technique  - Identify and instruct in appropriate isolation precautions for identified infection/condition  Outcome: Progressing  Goal: Absence of fever/infection during neutropenic period  Description: INTERVENTIONS:  - Monitor WBC    Outcome: Progressing     Problem: METABOLIC, FLUID AND ELECTROLYTES - ADULT  Goal: Electrolytes maintained within normal limits  Description: INTERVENTIONS:  - Monitor labs and assess patient for signs and symptoms of electrolyte imbalances  - Administer electrolyte replacement as ordered  - Monitor response to electrolyte replacements, including repeat lab results as appropriate  - Instruct patient on fluid and nutrition as appropriate  Outcome: Progressing  Goal: Fluid balance maintained  Description: INTERVENTIONS:  - Monitor labs   - Monitor I/O and WT  - Instruct patient on fluid and nutrition as appropriate  - Assess for signs & symptoms of volume excess or deficit  Outcome: Progressing     Problem: RESPIRATORY - ADULT  Goal: Achieves optimal ventilation and oxygenation  Description: INTERVENTIONS:  - Assess for changes in respiratory status  - Assess for changes in mentation and behavior  - Position to facilitate oxygenation and minimize respiratory effort  - Oxygen administered by appropriate delivery if ordered  - Initiate smoking cessation education as indicated  - Encourage broncho-pulmonary hygiene including cough, deep breathe, Incentive Spirometry  - Assess the need for suctioning and aspirate as needed  - Assess and instruct to report SOB or any respiratory difficulty  - Respiratory Therapy support as indicated  Outcome: Progressing     Problem: Nutrition/Hydration-ADULT  Goal: Nutrient/Hydration intake appropriate for improving, restoring or maintaining nutritional needs  Description: Monitor and assess patient's nutrition/hydration status for malnutrition. Collaborate with interdisciplinary team and initiate plan and interventions as ordered. Monitor patient's weight and dietary intake as ordered or per policy. Utilize nutrition screening tool and intervene as necessary. Determine patient's food preferences and provide high-protein, high-caloric foods as appropriate.      INTERVENTIONS:  - Monitor oral intake, urinary output, labs, and treatment plans  - Assess nutrition and hydration status and recommend course of action  - Evaluate amount of meals eaten  - Assist patient with eating if necessary   - Allow adequate time for meals  - Recommend/ encourage appropriate diets, oral nutritional supplements, and vitamin/mineral supplements  - Order, calculate, and assess calorie counts as needed  - Recommend, monitor, and adjust tube feedings and TPN/PPN based on assessed needs  - Assess need for intravenous fluids  - Provide specific nutrition/hydration education as appropriate  - Include patient/family/caregiver in decisions related to nutrition  Outcome: Progressing

## 2023-08-02 LAB
ANION GAP SERPL CALCULATED.3IONS-SCNC: 5 MMOL/L
BUN SERPL-MCNC: 30 MG/DL (ref 5–25)
CALCIUM SERPL-MCNC: 7.8 MG/DL (ref 8.4–10.2)
CHLORIDE SERPL-SCNC: 100 MMOL/L (ref 96–108)
CO2 SERPL-SCNC: 27 MMOL/L (ref 21–32)
CREAT SERPL-MCNC: 2.14 MG/DL (ref 0.6–1.3)
ERYTHROCYTE [DISTWIDTH] IN BLOOD BY AUTOMATED COUNT: 15.4 % (ref 11.6–15.1)
GFR SERPL CREATININE-BSD FRML MDRD: 27 ML/MIN/1.73SQ M
GLUCOSE SERPL-MCNC: 185 MG/DL (ref 65–140)
HCT VFR BLD AUTO: 28.9 % (ref 36.5–49.3)
HGB BLD-MCNC: 9.2 G/DL (ref 12–17)
L PNEUMO1 AG UR QL IA.RAPID: NEGATIVE
MCH RBC QN AUTO: 32.6 PG (ref 26.8–34.3)
MCHC RBC AUTO-ENTMCNC: 31.8 G/DL (ref 31.4–37.4)
MCV RBC AUTO: 103 FL (ref 82–98)
PLATELET # BLD AUTO: 109 THOUSANDS/UL (ref 149–390)
PMV BLD AUTO: 9.7 FL (ref 8.9–12.7)
POTASSIUM SERPL-SCNC: 3.8 MMOL/L (ref 3.5–5.3)
RBC # BLD AUTO: 2.82 MILLION/UL (ref 3.88–5.62)
S PNEUM AG UR QL: NEGATIVE
SODIUM SERPL-SCNC: 132 MMOL/L (ref 135–147)
WBC # BLD AUTO: 4.16 THOUSAND/UL (ref 4.31–10.16)

## 2023-08-02 PROCEDURE — 99232 SBSQ HOSP IP/OBS MODERATE 35: CPT | Performed by: NURSE PRACTITIONER

## 2023-08-02 PROCEDURE — 94760 N-INVAS EAR/PLS OXIMETRY 1: CPT

## 2023-08-02 PROCEDURE — 85027 COMPLETE CBC AUTOMATED: CPT | Performed by: INTERNAL MEDICINE

## 2023-08-02 PROCEDURE — 94640 AIRWAY INHALATION TREATMENT: CPT

## 2023-08-02 PROCEDURE — 99232 SBSQ HOSP IP/OBS MODERATE 35: CPT | Performed by: INTERNAL MEDICINE

## 2023-08-02 PROCEDURE — 80048 BASIC METABOLIC PNL TOTAL CA: CPT | Performed by: INTERNAL MEDICINE

## 2023-08-02 PROCEDURE — 87449 NOS EACH ORGANISM AG IA: CPT | Performed by: NURSE PRACTITIONER

## 2023-08-02 PROCEDURE — 87205 SMEAR GRAM STAIN: CPT | Performed by: NURSE PRACTITIONER

## 2023-08-02 PROCEDURE — 97530 THERAPEUTIC ACTIVITIES: CPT

## 2023-08-02 PROCEDURE — 87070 CULTURE OTHR SPECIMN AEROBIC: CPT | Performed by: NURSE PRACTITIONER

## 2023-08-02 RX ADMIN — METRONIDAZOLE 500 MG: 500 TABLET ORAL at 21:53

## 2023-08-02 RX ADMIN — METRONIDAZOLE 500 MG: 500 TABLET ORAL at 06:16

## 2023-08-02 RX ADMIN — METRONIDAZOLE 500 MG: 500 TABLET ORAL at 13:05

## 2023-08-02 RX ADMIN — APIXABAN 2.5 MG: 2.5 TABLET, FILM COATED ORAL at 16:58

## 2023-08-02 RX ADMIN — TIMOLOL MALEATE 1 DROP: 5 SOLUTION/ DROPS OPHTHALMIC at 09:25

## 2023-08-02 RX ADMIN — TORSEMIDE 20 MG: 20 TABLET ORAL at 09:24

## 2023-08-02 RX ADMIN — ALLOPURINOL 100 MG: 100 TABLET ORAL at 09:24

## 2023-08-02 RX ADMIN — ALLOPURINOL 100 MG: 100 TABLET ORAL at 16:58

## 2023-08-02 RX ADMIN — ACETAMINOPHEN 975 MG: 650 SUSPENSION ORAL at 09:24

## 2023-08-02 RX ADMIN — CEFEPIME HYDROCHLORIDE 1000 MG: 1 INJECTION, SOLUTION INTRAVENOUS at 21:54

## 2023-08-02 RX ADMIN — TAMSULOSIN HYDROCHLORIDE 0.4 MG: 0.4 CAPSULE ORAL at 16:05

## 2023-08-02 RX ADMIN — LEVALBUTEROL HYDROCHLORIDE 1.25 MG: 1.25 SOLUTION RESPIRATORY (INHALATION) at 19:56

## 2023-08-02 RX ADMIN — LEVALBUTEROL HYDROCHLORIDE 1.25 MG: 1.25 SOLUTION RESPIRATORY (INHALATION) at 07:17

## 2023-08-02 RX ADMIN — ACETAMINOPHEN 975 MG: 650 SUSPENSION ORAL at 16:05

## 2023-08-02 RX ADMIN — ACETAMINOPHEN 975 MG: 650 SUSPENSION ORAL at 00:57

## 2023-08-02 RX ADMIN — LEVALBUTEROL HYDROCHLORIDE 1.25 MG: 1.25 SOLUTION RESPIRATORY (INHALATION) at 14:00

## 2023-08-02 RX ADMIN — APIXABAN 2.5 MG: 2.5 TABLET, FILM COATED ORAL at 09:24

## 2023-08-02 RX ADMIN — ATORVASTATIN CALCIUM 40 MG: 40 TABLET, FILM COATED ORAL at 16:05

## 2023-08-02 RX ADMIN — CEFEPIME HYDROCHLORIDE 1000 MG: 1 INJECTION, SOLUTION INTRAVENOUS at 09:24

## 2023-08-02 RX ADMIN — LEVALBUTEROL HYDROCHLORIDE 1.25 MG: 1.25 SOLUTION RESPIRATORY (INHALATION) at 01:00

## 2023-08-02 NOTE — PLAN OF CARE
Problem: INFECTION - ADULT  Goal: Absence or prevention of progression during hospitalization  Description: INTERVENTIONS:  - Assess and monitor for signs and symptoms of infection  - Monitor lab/diagnostic results  - Monitor all insertion sites, i.e. indwelling lines, tubes, and drains  - Monitor endotracheal if appropriate and nasal secretions for changes in amount and color  - [place] appropriate cooling/warming therapies per order  - Administer medications as ordered  - Instruct and encourage patient and family to use good hand hygiene technique  - Identify and instruct in appropriate isolation precautions for identified infection/condition  Outcome: Progressing  Goal: Absence of fever/infection during neutropenic period  Description: INTERVENTIONS:  - Monitor WBC    Outcome: Progressing     Problem: METABOLIC, FLUID AND ELECTROLYTES - ADULT  Goal: Electrolytes maintained within normal limits  Description: INTERVENTIONS:  - Monitor labs and assess patient for signs and symptoms of electrolyte imbalances  - Administer electrolyte replacement as ordered  - Monitor response to electrolyte replacements, including repeat lab results as appropriate  - Instruct patient on fluid and nutrition as appropriate  Outcome: Progressing  Goal: Fluid balance maintained  Description: INTERVENTIONS:  - Monitor labs   - Monitor I/O and WT  - Instruct patient on fluid and nutrition as appropriate  - Assess for signs & symptoms of volume excess or deficit  Outcome: Progressing     Problem: RESPIRATORY - ADULT  Goal: Achieves optimal ventilation and oxygenation  Description: INTERVENTIONS:  - Assess for changes in respiratory status  - Assess for changes in mentation and behavior  - Position to facilitate oxygenation and minimize respiratory effort  - Oxygen administered by appropriate delivery if ordered  - Initiate smoking cessation education as indicated  - Encourage broncho-pulmonary hygiene including cough, deep breathe, Incentive Spirometry  - Assess the need for suctioning and aspirate as needed  - Assess and instruct to report SOB or any respiratory difficulty  - Respiratory Therapy support as indicated  Outcome: Progressing     Problem: Nutrition/Hydration-ADULT  Goal: Nutrient/Hydration intake appropriate for improving, restoring or maintaining nutritional needs  Description: Monitor and assess patient's nutrition/hydration status for malnutrition. Collaborate with interdisciplinary team and initiate plan and interventions as ordered. Monitor patient's weight and dietary intake as ordered or per policy. Utilize nutrition screening tool and intervene as necessary. Determine patient's food preferences and provide high-protein, high-caloric foods as appropriate.      INTERVENTIONS:  - Monitor oral intake, urinary output, labs, and treatment plans  - Assess nutrition and hydration status and recommend course of action  - Evaluate amount of meals eaten  - Assist patient with eating if necessary   - Allow adequate time for meals  - Recommend/ encourage appropriate diets, oral nutritional supplements, and vitamin/mineral supplements  - Order, calculate, and assess calorie counts as needed  - Recommend, monitor, and adjust tube feedings and TPN/PPN based on assessed needs  - Assess need for intravenous fluids  - Provide specific nutrition/hydration education as appropriate  - Include patient/family/caregiver in decisions related to nutrition  Outcome: Progressing

## 2023-08-02 NOTE — PLAN OF CARE
Problem: INFECTION - ADULT  Goal: Absence or prevention of progression during hospitalization  Description: INTERVENTIONS:  - Assess and monitor for signs and symptoms of infection  - Monitor lab/diagnostic results  - Monitor all insertion sites, i.e. indwelling lines, tubes, and drains  - Monitor endotracheal if appropriate and nasal secretions for changes in amount and color  - [place] appropriate cooling/warming therapies per order  - Administer medications as ordered  - Instruct and encourage patient and family to use good hand hygiene technique  - Identify and instruct in appropriate isolation precautions for identified infection/condition  Outcome: Progressing  Goal: Absence of fever/infection during neutropenic period  Description: INTERVENTIONS:  - Monitor WBC    Outcome: Progressing     Problem: METABOLIC, FLUID AND ELECTROLYTES - ADULT  Goal: Electrolytes maintained within normal limits  Description: INTERVENTIONS:  - Monitor labs and assess patient for signs and symptoms of electrolyte imbalances  - Administer electrolyte replacement as ordered  - Monitor response to electrolyte replacements, including repeat lab results as appropriate  - Instruct patient on fluid and nutrition as appropriate  Outcome: Progressing  Goal: Fluid balance maintained  Description: INTERVENTIONS:  - Monitor labs   - Monitor I/O and WT  - Instruct patient on fluid and nutrition as appropriate  - Assess for signs & symptoms of volume excess or deficit  Outcome: Progressing     Problem: RESPIRATORY - ADULT  Goal: Achieves optimal ventilation and oxygenation  Description: INTERVENTIONS:  - Assess for changes in respiratory status  - Assess for changes in mentation and behavior  - Position to facilitate oxygenation and minimize respiratory effort  - Oxygen administered by appropriate delivery if ordered  - Initiate smoking cessation education as indicated  - Encourage broncho-pulmonary hygiene including cough, deep breathe, Incentive Spirometry  - Assess the need for suctioning and aspirate as needed  - Assess and instruct to report SOB or any respiratory difficulty  - Respiratory Therapy support as indicated  Outcome: Progressing     Problem: Nutrition/Hydration-ADULT  Goal: Nutrient/Hydration intake appropriate for improving, restoring or maintaining nutritional needs  Description: Monitor and assess patient's nutrition/hydration status for malnutrition. Collaborate with interdisciplinary team and initiate plan and interventions as ordered. Monitor patient's weight and dietary intake as ordered or per policy. Utilize nutrition screening tool and intervene as necessary. Determine patient's food preferences and provide high-protein, high-caloric foods as appropriate.      INTERVENTIONS:  - Monitor oral intake, urinary output, labs, and treatment plans  - Assess nutrition and hydration status and recommend course of action  - Evaluate amount of meals eaten  - Assist patient with eating if necessary   - Allow adequate time for meals  - Recommend/ encourage appropriate diets, oral nutritional supplements, and vitamin/mineral supplements  - Order, calculate, and assess calorie counts as needed  - Recommend, monitor, and adjust tube feedings and TPN/PPN based on assessed needs  - Assess need for intravenous fluids  - Provide specific nutrition/hydration education as appropriate  - Include patient/family/caregiver in decisions related to nutrition  Outcome: Progressing

## 2023-08-02 NOTE — ASSESSMENT & PLAN NOTE
· Combined CHF   · Restart torsemide   · Monitor I+O, daily weights    Wt Readings from Last 3 Encounters:   07/27/23 68.1 kg (150 lb 1.6 oz)   07/10/23 64.9 kg (143 lb 1.3 oz)   06/28/23 74.5 kg (164 lb 3.9 oz)

## 2023-08-02 NOTE — CASE MANAGEMENT
Case Management Discharge Planning Note    Patient name Abraham Portillo  Location [address] [place] [address] [address]  MRN [id number]  : 1940 Date 2023       Current Admission Date: 2023  Current Admission Diagnosis:Aspiration pneumonia [facility])   Patient Active Problem List    Diagnosis Date Noted   • Sepsis ([address]) 2023   • Goals of care, counseling/discussion 2023   • Aspiration pneumonia ([address]) 2023   • Duodenal ulcer 07/10/2023   • Encephalopathy 2023   • Recent empyema of lung with persistent locuted R hydropneumothorax 2023   • Hypoglycemia 2023   • Thrombocytopenia ([address]) 2023   • Diarrhea 2023   • Hypothermia 2023   • Pleural effusion, right side 2023   • Septic shock ([address]) 2023   • Urinary retention 2023   • Macrocytosis 2023   • Hyponatremia 2023   • Anemia 2023   • Chronic kidney disease-mineral and bone disorder 2023   • Advanced care planning/counseling discussion 2023   • Acute kidney injury superimposed on chronic kidney disease ([address]) 2022   • Benign hypertension with CKD (chronic kidney disease) stage IV ([address]) 2022   • Persistent proteinuria 2022   • COVID-19 2022   • Electrolyte abnormality 2022   • Cellulitis of left upper extremity 2021   • Atrial fibrillation with slow ventricular response ([address]) 2021   • Vitamin D deficiency 10/18/2019   • Chronic combined systolic and diastolic congestive heart failure ([address]) 2019   • Recent pericardial effusion 2019   • Leg edema 01/10/2019   • 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 ([address]) 01/10/2019   • PVC (premature ventricular contraction) 2018   • Essential hypertension 2018   • Closed traumatic displaced fracture of distal end of left radius with malunion 2018      LOS (days): 6  Geometric Mean LOS (GMLOS) (days): 5.00  Days to GMLOS:-0.9 OBJECTIVE:  Risk of Unplanned Readmission Score: 33.18     Current admission status: Inpatient   Preferred Pharmacy:   [facility] [address] [place], [address] [place] [id number] [facility] [address] [place] [address] [address]  [address] [address]  Phone: [phone] Fax: [phone]    Primary Care Provider: Gilbert Villela DO    Primary Insurance: MEDICARE  Secondary Insurance: BLUE CROSS    DISCHARGE DETAILS:    Discharge planning discussed with[de-identified] areli Roach  Freedom of Choice: Yes  Comments - Freedom of Choice: SW following to assist with DCP. SW placed call to pt's son to review recommendations and discuss plans. STR is being recommended by PT. Son said he would be open to pt returning to rehab if needed. Pt had been in [facility] prior to admission and son said his preference would be for pt to return to same facility if rehab is planned. Pt's son also had questions about hospice care and stated that he would consider home hospice for pt if the doctor's feel it would be best. SW provided information on home hospice care and level of family involvement that would be needed for home hospice. Per son he would be home with pt, he lives with him, and pt's sister would also be able to help some. Encouraged son to consider all options and continue talking with physicians about pt's condition and prognosis. SW offered to update [facility] to prepare for return if planned and son was agreeable with plan. Son said he would be coming in to talk with pt as well about pt's thought's on plan. SW offered ongoing support and assistance to son. Will follow to monitor progress and assist as needed.   CM contacted family/caregiver?: Yes  Were Treatment Team discharge recommendations reviewed with patient/caregiver?: Yes  Did patient/caregiver verbalize understanding of patient care needs?: Yes  Were patient/caregiver advised of the risks associated with not following Treatment Team discharge recommendations?: Yes    Contacts  Patient Contacts: Thor Cook  Relationship to Patient[de-identified] Family (son)  Contact Method: Phone  Phone Number: [phone]  Reason/Outcome: Discharge Planning, [address]         Is the patient interested in [address] [facility] at discharge?: No    DME Referral Provided  Referral made for DME?: No    Other Referral/Resources/Interventions Provided:  Interventions: Short Term Rehab  Referral Comments: Referral made to [facility] to prepare for possible return as requested by son    Treatment Team Recommendation: Short Term Rehab  Discharge Destination Plan[de-identified] Short Term Rehab  Transport at Discharge : BLS Ambulance

## 2023-08-02 NOTE — PLAN OF CARE
Problem: INFECTION - ADULT  Goal: Absence or prevention of progression during hospitalization  Description: INTERVENTIONS:  - Assess and monitor for signs and symptoms of infection  - Monitor lab/diagnostic results  - Monitor all insertion sites, i.e. indwelling lines, tubes, and drains  - Monitor endotracheal if appropriate and nasal secretions for changes in amount and color  - [place] appropriate cooling/warming therapies per order  - Administer medications as ordered  - Instruct and encourage patient and family to use good hand hygiene technique  - Identify and instruct in appropriate isolation precautions for identified infection/condition  Outcome: Progressing  Goal: Absence of fever/infection during neutropenic period  Description: INTERVENTIONS:  - Monitor WBC    Outcome: Progressing     Problem: METABOLIC, FLUID AND ELECTROLYTES - ADULT  Goal: Electrolytes maintained within normal limits  Description: INTERVENTIONS:  - Monitor labs and assess patient for signs and symptoms of electrolyte imbalances  - Administer electrolyte replacement as ordered  - Monitor response to electrolyte replacements, including repeat lab results as appropriate  - Instruct patient on fluid and nutrition as appropriate  Outcome: Progressing  Goal: Fluid balance maintained  Description: INTERVENTIONS:  - Monitor labs   - Monitor I/O and WT  - Instruct patient on fluid and nutrition as appropriate  - Assess for signs & symptoms of volume excess or deficit  Outcome: Progressing     Problem: RESPIRATORY - ADULT  Goal: Achieves optimal ventilation and oxygenation  Description: INTERVENTIONS:  - Assess for changes in respiratory status  - Assess for changes in mentation and behavior  - Position to facilitate oxygenation and minimize respiratory effort  - Oxygen administered by appropriate delivery if ordered  - Initiate smoking cessation education as indicated  - Encourage broncho-pulmonary hygiene including cough, deep breathe, Incentive Spirometry  - Assess the need for suctioning and aspirate as needed  - Assess and instruct to report SOB or any respiratory difficulty  - Respiratory Therapy support as indicated  Outcome: Progressing     Problem: Nutrition/Hydration-ADULT  Goal: Nutrient/Hydration intake appropriate for improving, restoring or maintaining nutritional needs  Description: Monitor and assess patient's nutrition/hydration status for malnutrition. Collaborate with interdisciplinary team and initiate plan and interventions as ordered. Monitor patient's weight and dietary intake as ordered or per policy. Utilize nutrition screening tool and intervene as necessary. Determine patient's food preferences and provide high-protein, high-caloric foods as appropriate.      INTERVENTIONS:  - Monitor oral intake, urinary output, labs, and treatment plans  - Assess nutrition and hydration status and recommend course of action  - Evaluate amount of meals eaten  - Assist patient with eating if necessary   - Allow adequate time for meals  - Recommend/ encourage appropriate diets, oral nutritional supplements, and vitamin/mineral supplements  - Order, calculate, and assess calorie counts as needed  - Recommend, monitor, and adjust tube feedings and TPN/PPN based on assessed needs  - Assess need for intravenous fluids  - Provide specific nutrition/hydration education as appropriate  - Include patient/family/caregiver in decisions related to nutrition  Outcome: Progressing

## 2023-08-02 NOTE — ASSESSMENT & PLAN NOTE
History of atrial fibrillation congestive heart failure CKD 4 diabetes and recent pericardial effusion presents to the hospital with shortness of breath found to have recurrent right exudative pleural effusion/parapneumonic effusion  · Sepsis on admission secondary to aspiration pneumonia and effusion  · Currently remains on cefepime and metronidazole: Antibiotic day 7  · Chest tube in place being managed by pulmonology. Small pneumothorax repeat imaging today.   · Dysphagia: Status post video barium swallow continue current puréed and nectar thickened liquids

## 2023-08-02 NOTE — ASSESSMENT & PLAN NOTE
· mpyema of the lung requiring s/p chest tube placement on 6/24 and removal on 7/7. Received tPA/dornase administration and 7-day course of cefepime during previous admission. · Chest tube placed again on 7/28 by IR due to persistent right hydropneumothorax. Pleural effusion PH 7.8 with no evidence of recurrent empyema. · Cultures showing no growth   · IR and pulmonary following for chest tube management. · Complaining of pain around chest tube site, will add low-dose oxycodone and IV Dilaudid as needed.   Monitor mental status closely

## 2023-08-02 NOTE — PROGRESS NOTES
Progress Note - Pulmonary   Alysha  78 y.o. male MRN: [id number]  Unit/Bed#: [address] [place] [address] A Encounter: [id number]    Assessment:  Right exudative pleural effusion: CKD on diuretics   Parapneumonic effusion  Status post chest tube placed: still draining and has small pneumothorax     Plan:  Cont chest tube drainage: still has significant draining over 250 cc in last 24 hours: cxr did not revealed any pneumothorax   IS  Cont diuresis as per renal   Aspiration precaution    Overall long term prognosis is very poor; Discussed with Dr. Sarah Rios    Subjective:   Pt seen and examined at bedside. Pt is afebrile. He denies any new complaints. Objective:     Vitals: Blood pressure 135/79, pulse 86, temperature (!) 96.1 °F (35.6 °C), resp. rate 18, height 5' 9" (1.753 m), weight 68.1 kg (150 lb 1.6 oz), SpO2 100 %. ,Body mass index is 22.17 kg/m². Intake/Output Summary (Last 24 hours) at 8/2/2023 1150  Last data filed at 8/2/2023 0700  Gross per 24 hour   Intake --   Output 940 ml   Net -940 ml       Invasive Devices     Peripheral Intravenous Line  Duration           Peripheral IV 08/02/23 Left;Ventral (anterior) Forearm <1 day          Drain  Duration           External Urinary Catheter Small 23 days    Chest Tube 1 Right Pleural 10 Fr. 4 days    External Urinary Catheter 3 days                Physical Exam:     General: Awake follows simple command, appears debilitated   HEENT: atrumatic head  Lungs: decreased breath sound at the right lung base, no rales or wheezing; chest tube   CVS: S1S2+, no m/r/g  Abdomen: soft, Nd, NT  Ext; no edema  Neuro: awake follows simple command       Labs: I have personally reviewed pertinent lab results. Imaging and other studies: I have personally reviewed pertinent reports.    and I have personally reviewed pertinent films in PACS

## 2023-08-02 NOTE — ASSESSMENT & PLAN NOTE
· Anemia without evidence of blood loss. Likely chronic disease.   · Improving with diuresis    Results from last 7 days   Lab Units 08/02/23  0621 08/01/23  0501 07/31/23  0453 07/30/23  0537   HEMOGLOBIN g/dL 9.2* 8.3* 8.0* 9.0*

## 2023-08-02 NOTE — ASSESSMENT & PLAN NOTE
· Sees nephrology as an outpatient baseline creatinine closer to 2.3  · Takes torsemide 40 mg daily at home  · Restarted torsemide 20 mg daily on 8/1 at lower dosing given poor oral intake    Results from last 7 days   Lab Units 08/02/23  0621 08/01/23  0501 07/31/23  0453 07/30/23  0537 07/29/23  0631 07/28/23  0528 07/27/23  1648   BUN mg/dL 30* 32* 32* 32* 34* 36* 36*   CREATININE mg/dL 2.14* 2.28* 2.29* 2.38* 2.68* 2.59* 2.80*   EGFR ml/min/1.73sq m 27 25 25 24 20 21 19

## 2023-08-02 NOTE — PROGRESS NOTES
[address]  Progress Note  Name: Ubaldo Huerta  MRN: [id number]  Unit/Bed#: [address] [place] [address] A  Date of Admission: 7/27/2023   Date of Service: 8/2/2023 I Hospital Day: 6    Assessment/Plan   * Aspiration pneumonia [facility])  Assessment & Plan  History of atrial fibrillation congestive heart failure CKD 4 diabetes and recent pericardial effusion presents to the hospital with shortness of breath found to have recurrent right exudative pleural effusion/parapneumonic effusion  · Sepsis on admission secondary to aspiration pneumonia and effusion  · Currently remains on cefepime and metronidazole: Antibiotic day 7  · Chest tube in place being managed by pulmonology. Small pneumothorax repeat imaging today. · Dysphagia: Status post video barium swallow continue current puréed and nectar thickened liquids    Recent empyema of lung with persistent locuted R hydropneumothorax  Assessment & Plan  · mpyema of the lung requiring s/p chest tube placement on 6/24 and removal on 7/7. Received tPA/dornase administration and 7-day course of cefepime during previous admission. · Chest tube placed again on 7/28 by IR due to persistent right hydropneumothorax. Pleural effusion PH 7.8 with no evidence of recurrent empyema. · Cultures showing no growth   · IR and pulmonary following for chest tube management. · Complaining of pain around chest tube site, will add low-dose oxycodone and IV Dilaudid as needed. Monitor mental status closely     Anemia  Assessment & Plan  · Anemia without evidence of blood loss. Likely chronic disease.   · Improving with diuresis    Results from last 7 days   Lab Units 08/02/23  0621 08/01/23  0501 07/31/23  0453 07/30/23  0537   HEMOGLOBIN g/dL 9.2* 8.3* 8.0* 9.0*       Acute kidney injury superimposed on chronic kidney disease ([address])  Assessment & Plan  · Sees nephrology as an outpatient baseline creatinine closer to 2.3  · Takes torsemide 40 mg daily at home  · Restarted torsemide 20 mg daily on 8/1 at lower dosing given poor oral intake    Results from last 7 days   Lab Units 08/02/23  0621 08/01/23  0501 07/31/23  0453 07/30/23  0537 07/29/23  0631 07/28/23  0528 07/27/23  1648   BUN mg/dL 30* 32* 32* 32* 34* 36* 36*   CREATININE mg/dL 2.14* 2.28* 2.29* 2.38* 2.68* 2.59* 2.80*   EGFR ml/min/1.73sq m 27 25 25 24 20 21 19       Atrial fibrillation with slow ventricular response (HCC)  Assessment & Plan  · Intrinsically rate controlled. Continue eliquis    Chronic combined systolic and diastolic congestive heart failure (HCC)  Assessment & Plan  · Combined CHF   · Restart torsemide   · Monitor I+O, daily weights    Wt Readings from Last 3 Encounters:   07/27/23 68.1 kg (150 lb 1.6 oz)   07/10/23 64.9 kg (143 lb 1.3 oz)   06/28/23 74.5 kg (164 lb 3.9 oz)       Recent pericardial effusion  Assessment & Plan  · Recent pericardiocentesis 6/30 for tamponade. Recovered    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 ([address])  Assessment & Plan  · Diabetes mellitus prior to admission on januvia and glimepiride  · Monitor Accu-Cheks AC plus at bedtime with lispro insulin sliding scale coverage    Results from last 7 days   Lab Units 08/02/23  0621 08/01/23  0501 07/31/23  0453 07/30/23  0537   GLUCOSE RANDOM mg/dL 185* 137 116 92              VTE Pharmacologic Prophylaxis: VTE Score: 7 Moderate Risk (Score 3-4) - Pharmacological DVT Prophylaxis Ordered: apixaban (Eliquis). Patient Centered Rounds: I performed bedside rounds with nursing staff today. Discussions with Specialists or Other Care Team Provider: nursing, CM, pulmonary input     Education and Discussions with Family / Patient: Updated  (son) via phone.     Total Time Spent on Date of Encounter in care of patient: 35 minutes This time was spent on one or more of the following: performing physical exam; counseling and coordination of care; obtaining or reviewing history; documenting in the medical record; reviewing/ordering tests, medications or procedures; communicating with other healthcare professionals and discussing with patient's family/caregivers. Current Length of Stay: 6 day(s)  Current Patient Status: Inpatient   Certification Statement: The patient will continue to require additional inpatient hospital stay due to chest tube management   Discharge Plan: Anticipate discharge in 48-72 hrs to rehab facility. Code Status: Level 3 - DNAR and DNI    Subjective:   Patient seen and examined at bedside. Tolerating chest tube. Left arm swelling with pain. Occasional cough. Denies HA, dizziness, CP, abdominal pain, N/V/D. Objective:     Vitals:   Temp (24hrs), Av.1 °F (36.2 °C), Min:96.1 °F (35.6 °C), Max:98.2 °F (36.8 °C)    Temp:  [96.1 °F (35.6 °C)-98.2 °F (36.8 °C)] 96.1 °F (35.6 °C)  HR:  [71-87] 87  Resp:  [17-18] 18  BP: (126-135)/(73-79) 135/79  SpO2:  [99 %-100 %] 99 %  Body mass index is 22.17 kg/m². Input and Output Summary (last 24 hours): Intake/Output Summary (Last 24 hours) at 2023 1606  Last data filed at 2023 0700  Gross per 24 hour   Intake --   Output 940 ml   Net -940 ml       Physical Exam:   Physical Exam  Vitals and nursing note reviewed. Constitutional:       General: He is not in acute distress. Appearance: He is ill-appearing. He is not toxic-appearing or diaphoretic. Comments: Resting comfortably in bed on room air    HENT:      Head: Normocephalic. Mouth/Throat:      Mouth: Mucous membranes are moist.   Eyes:      Conjunctiva/sclera: Conjunctivae normal.   Cardiovascular:      Rate and Rhythm: Normal rate. Pulmonary:      Effort: Pulmonary effort is normal.      Breath sounds: Normal breath sounds. No wheezing, rhonchi or rales. Comments: Right-sided chest tube   Abdominal:      General: Bowel sounds are normal. There is no distension. Palpations: Abdomen is soft. Tenderness:  There is no abdominal tenderness. Musculoskeletal:         General: Swelling (LUE) present. Normal range of motion. Cervical back: Normal range of motion. Right lower leg: No edema. Left lower leg: No edema. Skin:     General: Skin is warm and dry. Capillary Refill: Capillary refill takes less than 2 seconds. Findings: Erythema (LUE) present. Neurological:      Mental Status: He is alert. Mental status is at baseline. He is disoriented. Motor: Weakness present. Psychiatric:         Mood and Affect: Mood normal.         Behavior: Behavior normal.         Thought Content: Thought content normal.          Additional Data:     Labs:  Results from last 7 days   Lab Units 08/02/23  0621 08/01/23  0501 07/31/23  0453   WBC Thousand/uL 4.16*   < > 4.70   HEMOGLOBIN g/dL 9.2*   < > 8.0*   HEMATOCRIT % 28.9*   < > 25.6*   PLATELETS Thousands/uL 109*   < > 112*   NEUTROS PCT %  --   --  59   LYMPHS PCT %  --   --  26   MONOS PCT %  --   --  12   EOS PCT %  --   --  2    < > = values in this interval not displayed. Results from last 7 days   Lab Units 08/02/23  0621 08/01/23  0501   SODIUM mmol/L 132* 132*   POTASSIUM mmol/L 3.8 4.1   CHLORIDE mmol/L 100 101   CO2 mmol/L 27 25   BUN mg/dL 30* 32*   CREATININE mg/dL 2.14* 2.28*   ANION GAP mmol/L 5 6   CALCIUM mg/dL 7.8* 7.6*   ALBUMIN g/dL  --  2.7*   TOTAL BILIRUBIN mg/dL  --  0.45   ALK PHOS U/L  --  107*   ALT U/L  --  10   AST U/L  --  12*   GLUCOSE RANDOM mg/dL 185* 137     Results from last 7 days   Lab Units 07/27/23  1648   INR  1.44*             Results from last 7 days   Lab Units 08/01/23  0501 07/28/23  0528 07/27/23  2203 07/27/23  1946 07/27/23  1648   LACTIC ACID mmol/L  --   --   --  1.3 2.8*   PROCALCITONIN ng/ml 0.12 0.19 0.20  --   --        Lines/Drains:  Invasive Devices     Peripheral Intravenous Line  Duration           Peripheral IV 08/02/23 Distal;Dorsal (posterior); Right Forearm <1 day          Drain  Duration           External Urinary Catheter Small 23 days    Chest Tube 1 Right Pleural 10 Fr. 4 days    External Urinary Catheter 3 days                      Imaging: Reviewed radiology reports from this admission including: chest xray    Recent Cultures (last 7 days):   Results from last 7 days   Lab Units 08/02/23  0918 08/02/23  0912 07/28/23  1633 07/27/23  1722 07/27/23  1659 07/27/23  1654   BLOOD CULTURE   --   --   --   --  No Growth After 5 Days. No Growth After 5 Days.    GRAM STAIN RESULT  1+ Epithelial cells per low power field*  No polys seen*  1+ Gram positive cocci in pairs*  --  No Polys or Bacteria seen  --   --   --    URINE CULTURE   --   --   --  10,000-19,000 cfu/ml  --   --    BODY FLUID CULTURE, STERILE   --   --  No growth  --   --   --    LEGIONELLA URINARY ANTIGEN   --  Negative  --   --   --   --        Last 24 Hours Medication List:   Current Facility-Administered Medications   Medication Dose Route Frequency Provider Last Rate   • acetaminophen  975 mg Oral Q8H Vilma Dawn MD     • allopurinol  100 mg Oral BID Ott Cady, DO     • apixaban  2.5 mg Oral BID Ott Cady, DO     • atorvastatin  40 mg Oral Daily With Dinner Kenney Canela Slight, DO     • cefepime  1,000 mg Intravenous Q12H Tru Bookere, DO 1,000 mg (08/02/23 0475)   • docusate sodium  100 mg Oral BID PRN Vilma Dawn MD     • HYDROmorphone  0.2 mg Intravenous Q4H PRN Vilma Dawn MD     • levalbuterol  1.25 mg Nebulization Q6H SUSANA Nicholson     • metroNIDAZOLE  500 mg Oral [facility] Kenney Canela Slight, DO     • ondansetron  4 mg Intravenous Q6H PRN Tru Lazar DO     • oxyCODONE  2.5 mg Oral Q4H PRN Vilma Dawn MD     • tamsulosin  0.4 mg Oral Daily With Dinner Kenney Canela Slight, DO     • timolol  1 drop Both Eyes Daily Kenney Canela Slight, DO     • torsemide  20 mg Oral Daily [place] Mangle, DO          Today, Patient Was Seen By: SUSANA Carr    **Please Note: This note may have been constructed using a voice recognition system. **

## 2023-08-02 NOTE — ASSESSMENT & PLAN NOTE
· Diabetes mellitus prior to admission on januvia and glimepiride  · Monitor Accu-Cheks AC plus at bedtime with lispro insulin sliding scale coverage    Results from last 7 days   Lab Units 08/02/23  0621 08/01/23  0501 07/31/23  0453 07/30/23  0537   GLUCOSE RANDOM mg/dL 185* 137 116 92

## 2023-08-02 NOTE — PHYSICAL THERAPY NOTE
PT TREATMENT     08/02/23 1400   PT Last Visit   PT Visit Date 08/02/23   Note Type   Note Type Treatment   Pain Assessment   Pain Assessment Tool 0-10   Pain Score No Pain   Patient's Stated Pain Goal No pain   Restrictions/Precautions   Weight Bearing Precautions Per Order No   Other Precautions Chair Alarm; Bed Alarm; Fall Risk;Multiple lines   General   Chart Reviewed Yes   Family/Caregiver Present No   Cognition   Overall Cognitive Status WFL   Arousal/Participation Lethargic   Orientation Level Oriented X4   Following Commands Follows one step commands with increased time or repetition   Subjective   Subjective "I'm going to the bathroom"   Bed Mobility   Supine to Sit 3  Moderate assistance   Additional items HOB elevated;LE management; Increased time required;Assist x 2   Transfers   Sit to Stand 3  Moderate assistance   Additional items Assist x 1; Increased time required   Stand to Sit 3  Moderate assistance   Additional items Assist x 1; Armrests; Verbal cues   Toilet transfer 3  Moderate assistance   Additional items Assist x 1;Commode; Other  (Max A for Pericare)   Ambulation/Elevation   Gait pattern Poor UE support;Narrow MILLY; Forward Flexion; Step to;Short stride; Excessively slow   Gait Assistance 3  Moderate assist   Additional items Assist x 1;Verbal cues   Assistive Device Rolling walker   Distance 5ft   Stair Management Assistance Not tested   Balance   Static Sitting Poor +   Dynamic Sitting Poor +   Static Standing Poor   Dynamic Standing Poor   Ambulatory Poor   Endurance Deficit   Endurance Deficit No   Activity Tolerance   Activity Tolerance Patient limited by fatigue   Nurse Made Aware Yes   Equipment Use   Comments RW + commode   Assessment   Prognosis Good   Problem List Decreased strength;Decreased endurance; Impaired balance;Decreased mobility; Decreased coordination;Decreased skin integrity   Assessment Pt agreeable to PT session this PM. Upon initiating sit <> stand - pt had BM and was thus transfered to commode requiring Max A for pericare and Mod A for static standing. Afterwards, pt ambulated 5ft w/ RW + Mod A w/ chair follow and then returned to sitting. Pt will benefit from discharge to STR at this time to address impairments and return to PLOF. The patient's AM-Merged with [facility] Basic Mobility Inpatient Short Form Raw Score is 11. A Raw score of less than or equal to 16 suggests the patient may benefit from discharge to post-acute rehabilitation services. Please also refer to the recommendation of the Physical Therapist for safe discharge planning. Goals   Patient Goals To go home   Plan   Treatment/Interventions ADL retraining;Functional transfer training;LE strengthening/ROM; Therapeutic exercise; Endurance training;Bed mobility;Gait training; Compensatory technique education   Progress Slow progress, decreased activity tolerance   PT Frequency Other (Comment)  (5x/wk)   Recommendation   PT Discharge Recommendation Post acute rehabilitation services   AM-PAC Basic Mobility Inpatient   Turning in Flat Bed Without Bedrails 2   Lying on Back to Sitting on Edge of Flat Bed Without Bedrails 2   Moving Bed to Chair 2   Standing Up From Chair Using Arms 2   Walk in Room 2   Climb 3-5 Stairs With Railing 1   Basic Mobility Inpatient Raw Score 11   Basic Mobility Standardized Score 30.25   Turning Head Towards Sound 3   Follow Simple Instructions 2   Low Function Basic Mobility Raw Score  16   Low Function Basic Mobility Standardized Score  25.72   Highest Level Of Mobility   JH-HLM Goal 4: Move to chair/commode   JH-HLM Achieved 6: Walk 10 steps or more   End of Consult   Patient Position at End of Consult Bedside chair;Bed/Chair alarm activated; All needs within reach   Licensure   [address] [place] [address] Number  Naty Pi, SPT

## 2023-08-03 PROBLEM — J90 PLEURAL EFFUSION EXUDATIVE: Status: ACTIVE | Noted: 2023-08-03

## 2023-08-03 LAB
ALBUMIN SERPL BCP-MCNC: 2.7 G/DL (ref 3.5–5)
ALP SERPL-CCNC: 113 U/L (ref 34–104)
ALT SERPL W P-5'-P-CCNC: 11 U/L (ref 7–52)
ANION GAP SERPL CALCULATED.3IONS-SCNC: 5 MMOL/L
AST SERPL W P-5'-P-CCNC: 13 U/L (ref 13–39)
BASOPHILS # BLD AUTO: 0.02 THOUSANDS/ÂΜL (ref 0–0.1)
BASOPHILS NFR BLD AUTO: 1 % (ref 0–1)
BILIRUB SERPL-MCNC: 0.49 MG/DL (ref 0.2–1)
BUN SERPL-MCNC: 33 MG/DL (ref 5–25)
CALCIUM ALBUM COR SERPL-MCNC: 9 MG/DL (ref 8.3–10.1)
CALCIUM SERPL-MCNC: 8 MG/DL (ref 8.4–10.2)
CHLORIDE SERPL-SCNC: 100 MMOL/L (ref 96–108)
CO2 SERPL-SCNC: 29 MMOL/L (ref 21–32)
CREAT SERPL-MCNC: 2.2 MG/DL (ref 0.6–1.3)
EOSINOPHIL # BLD AUTO: 0.05 THOUSAND/ÂΜL (ref 0–0.61)
EOSINOPHIL NFR BLD AUTO: 1 % (ref 0–6)
ERYTHROCYTE [DISTWIDTH] IN BLOOD BY AUTOMATED COUNT: 15.7 % (ref 11.6–15.1)
GFR SERPL CREATININE-BSD FRML MDRD: 26 ML/MIN/1.73SQ M
GLUCOSE SERPL-MCNC: 310 MG/DL (ref 65–140)
HCT VFR BLD AUTO: 31.2 % (ref 36.5–49.3)
HGB BLD-MCNC: 10 G/DL (ref 12–17)
IMM GRANULOCYTES # BLD AUTO: 0.03 THOUSAND/UL (ref 0–0.2)
IMM GRANULOCYTES NFR BLD AUTO: 1 % (ref 0–2)
LYMPHOCYTES # BLD AUTO: 1.04 THOUSANDS/ÂΜL (ref 0.6–4.47)
LYMPHOCYTES NFR BLD AUTO: 25 % (ref 14–44)
MAGNESIUM SERPL-MCNC: 1.7 MG/DL (ref 1.9–2.7)
MCH RBC QN AUTO: 32.9 PG (ref 26.8–34.3)
MCHC RBC AUTO-ENTMCNC: 32.1 G/DL (ref 31.4–37.4)
MCV RBC AUTO: 103 FL (ref 82–98)
MONOCYTES # BLD AUTO: 0.52 THOUSAND/ÂΜL (ref 0.17–1.22)
MONOCYTES NFR BLD AUTO: 13 % (ref 4–12)
NEUTROPHILS # BLD AUTO: 2.51 THOUSANDS/ÂΜL (ref 1.85–7.62)
NEUTS SEG NFR BLD AUTO: 59 % (ref 43–75)
NRBC BLD AUTO-RTO: 0 /100 WBCS
PHOSPHATE SERPL-MCNC: 2.6 MG/DL (ref 2.3–4.1)
PLATELET # BLD AUTO: 120 THOUSANDS/UL (ref 149–390)
PMV BLD AUTO: 9.9 FL (ref 8.9–12.7)
POTASSIUM SERPL-SCNC: 4.1 MMOL/L (ref 3.5–5.3)
PROCALCITONIN SERPL-MCNC: 0.1 NG/ML
PROT SERPL-MCNC: 5.2 G/DL (ref 6.4–8.4)
RBC # BLD AUTO: 3.04 MILLION/UL (ref 3.88–5.62)
SODIUM SERPL-SCNC: 134 MMOL/L (ref 135–147)
WBC # BLD AUTO: 4.17 THOUSAND/UL (ref 4.31–10.16)

## 2023-08-03 PROCEDURE — 92526 ORAL FUNCTION THERAPY: CPT

## 2023-08-03 PROCEDURE — 94640 AIRWAY INHALATION TREATMENT: CPT

## 2023-08-03 PROCEDURE — 84100 ASSAY OF PHOSPHORUS: CPT | Performed by: NURSE PRACTITIONER

## 2023-08-03 PROCEDURE — 99232 SBSQ HOSP IP/OBS MODERATE 35: CPT | Performed by: INTERNAL MEDICINE

## 2023-08-03 PROCEDURE — 99232 SBSQ HOSP IP/OBS MODERATE 35: CPT | Performed by: NURSE PRACTITIONER

## 2023-08-03 PROCEDURE — 80053 COMPREHEN METABOLIC PANEL: CPT | Performed by: NURSE PRACTITIONER

## 2023-08-03 PROCEDURE — 84145 PROCALCITONIN (PCT): CPT | Performed by: NURSE PRACTITIONER

## 2023-08-03 PROCEDURE — 94760 N-INVAS EAR/PLS OXIMETRY 1: CPT

## 2023-08-03 PROCEDURE — 83735 ASSAY OF MAGNESIUM: CPT | Performed by: NURSE PRACTITIONER

## 2023-08-03 PROCEDURE — 85025 COMPLETE CBC W/AUTO DIFF WBC: CPT | Performed by: NURSE PRACTITIONER

## 2023-08-03 RX ORDER — FUROSEMIDE 10 MG/ML
40 INJECTION INTRAMUSCULAR; INTRAVENOUS ONCE
Status: COMPLETED | OUTPATIENT
Start: 2023-08-03 | End: 2023-08-03

## 2023-08-03 RX ORDER — LANOLIN ALCOHOL/MO/W.PET/CERES
400 CREAM (GRAM) TOPICAL ONCE
Status: COMPLETED | OUTPATIENT
Start: 2023-08-03 | End: 2023-08-03

## 2023-08-03 RX ORDER — TORSEMIDE 20 MG/1
40 TABLET ORAL DAILY
Status: DISCONTINUED | OUTPATIENT
Start: 2023-08-04 | End: 2023-08-10 | Stop reason: HOSPADM

## 2023-08-03 RX ADMIN — TORSEMIDE 20 MG: 20 TABLET ORAL at 08:08

## 2023-08-03 RX ADMIN — ACETAMINOPHEN 975 MG: 650 SUSPENSION ORAL at 17:29

## 2023-08-03 RX ADMIN — LEVALBUTEROL HYDROCHLORIDE 1.25 MG: 1.25 SOLUTION RESPIRATORY (INHALATION) at 21:00

## 2023-08-03 RX ADMIN — ACETAMINOPHEN 975 MG: 650 SUSPENSION ORAL at 08:15

## 2023-08-03 RX ADMIN — LEVALBUTEROL HYDROCHLORIDE 1.25 MG: 1.25 SOLUTION RESPIRATORY (INHALATION) at 07:03

## 2023-08-03 RX ADMIN — APIXABAN 2.5 MG: 2.5 TABLET, FILM COATED ORAL at 08:08

## 2023-08-03 RX ADMIN — METRONIDAZOLE 500 MG: 500 TABLET ORAL at 14:37

## 2023-08-03 RX ADMIN — APIXABAN 2.5 MG: 2.5 TABLET, FILM COATED ORAL at 17:33

## 2023-08-03 RX ADMIN — TAMSULOSIN HYDROCHLORIDE 0.4 MG: 0.4 CAPSULE ORAL at 15:58

## 2023-08-03 RX ADMIN — ATORVASTATIN CALCIUM 40 MG: 40 TABLET, FILM COATED ORAL at 15:58

## 2023-08-03 RX ADMIN — METRONIDAZOLE 500 MG: 500 TABLET ORAL at 06:33

## 2023-08-03 RX ADMIN — TIMOLOL MALEATE 1 DROP: 5 SOLUTION/ DROPS OPHTHALMIC at 08:38

## 2023-08-03 RX ADMIN — LEVALBUTEROL HYDROCHLORIDE 1.25 MG: 1.25 SOLUTION RESPIRATORY (INHALATION) at 01:47

## 2023-08-03 RX ADMIN — ALLOPURINOL 100 MG: 100 TABLET ORAL at 17:33

## 2023-08-03 RX ADMIN — ALLOPURINOL 100 MG: 100 TABLET ORAL at 08:08

## 2023-08-03 RX ADMIN — LEVALBUTEROL HYDROCHLORIDE 1.25 MG: 1.25 SOLUTION RESPIRATORY (INHALATION) at 13:02

## 2023-08-03 RX ADMIN — Medication 400 MG: at 12:02

## 2023-08-03 RX ADMIN — FUROSEMIDE 40 MG: 10 INJECTION, SOLUTION INTRAVENOUS at 11:28

## 2023-08-03 RX ADMIN — ACETAMINOPHEN 975 MG: 650 SUSPENSION ORAL at 00:51

## 2023-08-03 RX ADMIN — CEFEPIME HYDROCHLORIDE 1000 MG: 1 INJECTION, SOLUTION INTRAVENOUS at 11:31

## 2023-08-03 NOTE — PROGRESS NOTES
-- Patient: Lance Alert  -- MRN: [id number]  -- Aidin Request ID: [id number]  -- Level of care reserved: [address]  -- Partner Reserved: [address] - [place], [facility] [place] [address] [phone]  -- Clinical needs requested:  -- Geography searched: 10 miles around [id number]  -- Start of Service:  -- Request sent: 3:21pm EDT on 8/2/2023 by Genevieve Sweet  -- Partner reserved: 4:48pm EDT on 8/3/2023 by Genevieve Sweet  -- Choice list shared: 10:57am EDT on 8/3/2023 by Genevieve Sweet

## 2023-08-03 NOTE — ASSESSMENT & PLAN NOTE
History of atrial fibrillation congestive heart failure CKD 4 diabetes and recent pericardial effusion presents to the hospital with shortness of breath found to have recurrent right exudative pleural effusion/parapneumonic effusion  · Sepsis on admission secondary to aspiration pneumonia and effusion  · Completed 7 days cefepime and metronidazole  · Chest tube in place being managed by pulmonology  · Dysphagia: Status post video barium swallow  · Speech recommendations: mechanical soft diet with thin liquids

## 2023-08-03 NOTE — ASSESSMENT & PLAN NOTE
· empyema of the lung requiring chest tube placement on 6/24 and removal on 7/7. Received tPA/dornase administration and 7-day course of cefepime during previous admission. · Chest tube placed again on 7/28 by IR due to persistent right hydropneumothorax. Pleural effusion PH 7.8 with no evidence of recurrent empyema. · Cultures showing no growth   · IR and pulmonary following for chest tube management. · Complaining of pain around chest tube site, will add low-dose oxycodone and IV Dilaudid as needed.   Monitor mental status closely

## 2023-08-03 NOTE — ASSESSMENT & PLAN NOTE
· Anemia without evidence of blood loss. Likely chronic disease.   · Improving with diuresis    Results from last 7 days   Lab Units 08/04/23  0510 08/03/23  0438 08/02/23  0621 08/01/23  0501   HEMOGLOBIN g/dL 9.0* 10.0* 9.2* 8.3*

## 2023-08-03 NOTE — PROGRESS NOTES
[address]  Progress Note  Name: Vickie Herrera  MRN: [id number]  Unit/Bed#: 2 [place] [address] A  Date of Admission: 7/27/2023   Date of Service: 8/3/2023 I Hospital Day: 7    Assessment/Plan   * Aspiration pneumonia [facility])  Assessment & Plan  History of atrial fibrillation congestive heart failure CKD 4 diabetes and recent pericardial effusion presents to the hospital with shortness of breath found to have recurrent right exudative pleural effusion/parapneumonic effusion  · Sepsis on admission secondary to aspiration pneumonia and effusion  · Currently remains on cefepime and metronidazole: Antibiotic day 7  · Chest tube in place being managed by pulmonology. · Dysphagia: Status post video barium swallow   · continue current puréed and nectar thickened liquids  · Consideration for stopping ABX - defer to pulmonary     Pleural effusion exudative  Assessment & Plan  Right exudative pleural effusion with history of CKD/CHF on diuretics  · Pulmonary following, appreciate input  · Status post chest tube placement, still draining  · Increase diuretics  · Encourage incentive spirometry  · Aspiration precautions    Chronic combined systolic and diastolic congestive heart failure (HCC)  Assessment & Plan  · Combined CHF   · Serous output in chest tube + moist cough  · Give Lasix 40 mg IV x1 8/3  · Increase Torsemide to home dose of 40 mg daily   · Monitor I+O, daily weights    Wt Readings from Last 3 Encounters:   07/27/23 68.1 kg (150 lb 1.6 oz)   07/10/23 64.9 kg (143 lb 1.3 oz)   06/28/23 74.5 kg (164 lb 3.9 oz)       Recent empyema of lung with persistent locuted R hydropneumothorax  Assessment & Plan  · mpyema of the lung requiring s/p chest tube placement on 6/24 and removal on 7/7. Received tPA/dornase administration and 7-day course of cefepime during previous admission. · Chest tube placed again on 7/28 by IR due to persistent right hydropneumothorax.  Pleural effusion PH 7.8 with no evidence of recurrent empyema. · Cultures showing no growth   · IR and pulmonary following for chest tube management. · Complaining of pain around chest tube site, will add low-dose oxycodone and IV Dilaudid as needed. Monitor mental status closely     Anemia  Assessment & Plan  · Anemia without evidence of blood loss. Likely chronic disease. · Improving with diuresis    Results from last 7 days   Lab Units 08/03/23  0438 08/02/23  0621 08/01/23  0501 07/31/23  0453   HEMOGLOBIN g/dL 10.0* 9.2* 8.3* 8.0*       Acute kidney injury superimposed on chronic kidney disease ([address] )  Assessment & Plan  · Sees nephrology as an outpatient baseline creatinine closer to 2.3  · Takes torsemide 40 mg daily at home, restart and monitor     Results from last 7 days   Lab Units 08/03/23  0438 08/02/23  4640 08/01/23  0501 07/31/23  0453 07/30/23  0537 07/29/23  0631 07/28/23  0528 07/27/23  1648   BUN mg/dL 33* 30* 32* 32* 32* 34* 36* 36*   CREATININE mg/dL 2.20* 2.14* 2.28* 2.29* 2.38* 2.68* 2.59* 2.80*   EGFR ml/min/1.73sq m 26 27 25 25 24 20 21 19       Atrial fibrillation with slow ventricular response (HCC)  Assessment & Plan  · Intrinsically rate controlled. Continue eliquis    Recent pericardial effusion  Assessment & Plan  · Recent pericardiocentesis 6/30 for tamponade. Recovered    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 ([address])  Assessment & Plan  · Diabetes mellitus prior to admission on januvia and glimepiride  · Monitor Accu-Cheks AC plus at bedtime with lispro insulin sliding scale coverage    Results from last 7 days   Lab Units 08/03/23  0438 08/02/23  0621 08/01/23  0501 07/31/23  0453   GLUCOSE RANDOM mg/dL 310* 185* 137 116          VTE Pharmacologic Prophylaxis: VTE Score: 7 Moderate Risk (Score 3-4) - Pharmacological DVT Prophylaxis Ordered: apixaban (Eliquis). Patient Centered Rounds: I performed bedside rounds with nursing staff today.   Discussions with Specialists or Other Care Team Provider: nursing, CM, TT pulmonary     Education and Discussions with Family / Patient: Updated  (son) via phone. Total Time Spent on Date of Encounter in care of patient: 35 minutes This time was spent on one or more of the following: performing physical exam; counseling and coordination of care; obtaining or reviewing history; documenting in the medical record; reviewing/ordering tests, medications or procedures; communicating with other healthcare professionals and discussing with patient's family/caregivers. Current Length of Stay: 7 day(s)  Current Patient Status: Inpatient   Certification Statement: The patient will continue to require additional inpatient hospital stay due to chest tube management   Discharge Plan: Anticipate discharge in 48 hrs to rehab facility. Code Status: Level 3 - DNAR and DNI    Subjective:   Patient seen and examined at bedside. Reports a non productive cough. States the cough is constant and is unrelated to eating or drinking. Denies HA, dizziness, CP, abdominal pain, N/V/D. Feels weak and tired. Objective:     Vitals:   Temp (24hrs), Av.3 °F (36.3 °C), Min:96.9 °F (36.1 °C), Max:97.8 °F (36.6 °C)    Temp:  [96.9 °F (36.1 °C)-97.8 °F (36.6 °C)] 97.2 °F (36.2 °C)  HR:  [80-91] 80  Resp:  [16-18] 16  BP: (105-137)/(71-80) 134/80  SpO2:  [98 %-100 %] 99 %  Body mass index is 22.17 kg/m². Input and Output Summary (last 24 hours): Intake/Output Summary (Last 24 hours) at 8/3/2023 1124  Last data filed at 8/3/2023 0645  Gross per 24 hour   Intake --   Output 590 ml   Net -590 ml       Physical Exam:   Physical Exam  Vitals and nursing note reviewed. Constitutional:       General: He is not in acute distress. Appearance: He is not toxic-appearing or diaphoretic. Comments: Pleasant gentleman resting in bed on room air    HENT:      Head: Normocephalic.       Mouth/Throat:      Mouth: Mucous membranes are moist. Eyes:      Conjunctiva/sclera: Conjunctivae normal.   Cardiovascular:      Rate and Rhythm: Normal rate. Pulmonary:      Effort: Pulmonary effort is normal. No tachypnea or respiratory distress. Breath sounds: Examination of the right-lower field reveals decreased breath sounds. Decreased breath sounds present. No wheezing, rhonchi or rales. Comments: Right-sided chest tube with serous output  Moist, non productive cough   Abdominal:      General: Bowel sounds are normal. There is no distension. Palpations: Abdomen is soft. Tenderness: There is no abdominal tenderness. Musculoskeletal:         General: Swelling (left forearm ) present. No tenderness. Normal range of motion. Cervical back: Normal range of motion. Right lower leg: No edema. Left lower leg: No edema. Skin:     General: Skin is warm and dry. Capillary Refill: Capillary refill takes less than 2 seconds. Neurological:      Mental Status: He is alert and oriented to person, place, and time. Mental status is at baseline. Motor: Weakness present. Comments: Forgetful    Psychiatric:         Mood and Affect: Mood normal.         Behavior: Behavior normal.         Thought Content:  Thought content normal.         Judgment: Judgment normal.          Additional Data:     Labs:  Results from last 7 days   Lab Units 08/03/23  0438   WBC Thousand/uL 4.17*   HEMOGLOBIN g/dL 10.0*   HEMATOCRIT % 31.2*   PLATELETS Thousands/uL 120*   NEUTROS PCT % 59   LYMPHS PCT % 25   MONOS PCT % 13*   EOS PCT % 1     Results from last 7 days   Lab Units 08/03/23  0438   SODIUM mmol/L 134*   POTASSIUM mmol/L 4.1   CHLORIDE mmol/L 100   CO2 mmol/L 29   BUN mg/dL 33*   CREATININE mg/dL 2.20*   ANION GAP mmol/L 5   CALCIUM mg/dL 8.0*   ALBUMIN g/dL 2.7*   TOTAL BILIRUBIN mg/dL 0.49   ALK PHOS U/L 113*   ALT U/L 11   AST U/L 13   GLUCOSE RANDOM mg/dL 310*     Results from last 7 days   Lab Units 07/27/23  1648   INR  1.44* Results from last 7 days   Lab Units 08/03/23  0438 08/01/23  0501 07/28/23  0528 07/27/23  2203 07/27/23  1946 07/27/23  1648   LACTIC ACID mmol/L  --   --   --   --  1.3 2.8*   PROCALCITONIN ng/ml 0.10 0.12 0.19 0.20  --   --        Lines/Drains:  Invasive Devices     Peripheral Intravenous Line  Duration           Peripheral IV 08/02/23 Distal;Dorsal (posterior); Right Forearm <1 day          Drain  Duration           External Urinary Catheter Small 24 days    Chest Tube 1 Right Pleural 10 Fr. 5 days    External Urinary Catheter 4 days                      Imaging: Reviewed radiology reports from this admission including: chest xray    Recent Cultures (last 7 days):   Results from last 7 days   Lab Units 08/02/23  0918 08/02/23  0912 07/28/23  1633 07/27/23  1722 07/27/23  1659 07/27/23  1654   BLOOD CULTURE   --   --   --   --  No Growth After 5 Days. No Growth After 5 Days.    GRAM STAIN RESULT  1+ Epithelial cells per low power field*  No polys seen*  1+ Gram positive cocci in pairs*  --  No Polys or Bacteria seen  --   --   --    URINE CULTURE   --   --   --  10,000-19,000 cfu/ml  --   --    BODY FLUID CULTURE, STERILE   --   --  No growth  --   --   --    LEGIONELLA URINARY ANTIGEN   --  Negative  --   --   --   --        Last 24 Hours Medication List:   Current Facility-Administered Medications   Medication Dose Route Frequency Provider Last Rate   • acetaminophen  975 mg Oral Q8H Edmond Mcdonough MD     • allopurinol  100 mg Oral BID Cecilia Sous, DO     • apixaban  2.5 mg Oral BID Cecilia Sous, DO     • atorvastatin  40 mg Oral Daily With Dinner Kenney Alfred DO     • cefepime  1,000 mg Intravenous Q12H Cecilia Sous, DO 1,000 mg (08/02/23 2154)   • docusate sodium  100 mg Oral BID PRN Edmond Mcdonough MD     • furosemide  40 mg Intravenous Once SUSANA Loco     • HYDROmorphone  0.2 mg Intravenous Q4H PRN Edmond Mcdonough MD     • levalbuterol  1.25 mg Nebulization Q6H Deny Roberts SUSANA Marie     • metroNIDAZOLE  500 mg Oral [facility] Kenney Hoang, DO     • ondansetron  4 mg Intravenous Q6H PRN Angeal Curb, DO     • oxyCODONE  2.5 mg Oral Q4H PRN Opal Cary MD     • tamsulosin  0.4 mg Oral Daily With Dinner Angela Curb, DO     • timolol  1 drop Both Eyes Daily Angela Curb, DO     • [START ON 8/4/2023] torsemide  40 mg Oral Daily SUSANA Rosa          Today, Patient Was Seen By: SUSANA Rosa    **Please Note: This note may have been constructed using a voice recognition system. **

## 2023-08-03 NOTE — ASSESSMENT & PLAN NOTE
· Combined CHF   · Serous output in chest tube + moist cough  · Given Lasix 40 mg IV x1 8/3  · Continue home Torsemide 40 mg daily  · Monitor I+O, daily weights    Wt Readings from Last 3 Encounters:   07/27/23 68.1 kg (150 lb 1.6 oz)   07/10/23 64.9 kg (143 lb 1.3 oz)   06/28/23 74.5 kg (164 lb 3.9 oz)

## 2023-08-03 NOTE — PROGRESS NOTES
Progress Note - Pulmonary   Deyvi Gonzalez 78 y.o. male MRN: [id number]  Unit/Bed#: 2 [place] [address] A Encounter: [id number]    Assessment:  Right exudative pleural effusion: CKD on diuretics   Parapneumonic effusion  Status post chest tube placed: still draining and has small pneumothorax     Plan:  Cont chest tube drainage: Discussed option of pleurx with son Florencio Jacoby:   IS  Cont diuresis as per renal   Aspiration precaution    Overall long term prognosis is very poor; Discussed with primary team    Subjective:   Pt seen and examined at bedside. Pt is afebrile. He denies any new complaints. Objective:     Vitals: Blood pressure 134/80, pulse 80, temperature (!) 97.2 °F (36.2 °C), temperature source Axillary, resp. rate 16, height 5' 9" (1.753 m), weight 68.1 kg (150 lb 1.6 oz), SpO2 99 %. ,Body mass index is 22.17 kg/m². Intake/Output Summary (Last 24 hours) at 8/3/2023 1202  Last data filed at 8/3/2023 0645  Gross per 24 hour   Intake --   Output 590 ml   Net -590 ml       Invasive Devices     Peripheral Intravenous Line  Duration           Peripheral IV 08/02/23 Distal;Dorsal (posterior); Right Forearm <1 day          Drain  Duration           External Urinary Catheter Small 24 days    Chest Tube 1 Right Pleural 10 Fr. 5 days    External Urinary Catheter 4 days                Physical Exam:     General: Awake follows simple command, appears debilitated   HEENT: atrumatic head  Lungs: decreased breath sound at the right lung base, no rales or wheezing; chest tube   CVS: S1S2+, no m/r/g  Abdomen: soft, Nd, NT  Ext; no edema  Neuro: awake follows simple command       Labs: I have personally reviewed pertinent lab results. Imaging and other studies: I have personally reviewed pertinent reports.    and I have personally reviewed pertinent films in PACS

## 2023-08-03 NOTE — PLAN OF CARE
Problem: INFECTION - ADULT  Goal: Absence or prevention of progression during hospitalization  Description: INTERVENTIONS:  - Assess and monitor for signs and symptoms of infection  - Monitor lab/diagnostic results  - Monitor all insertion sites, i.e. indwelling lines, tubes, and drains  - Monitor endotracheal if appropriate and nasal secretions for changes in amount and color  - [place] appropriate cooling/warming therapies per order  - Administer medications as ordered  - Instruct and encourage patient and family to use good hand hygiene technique  - Identify and instruct in appropriate isolation precautions for identified infection/condition  Outcome: Progressing  Goal: Absence of fever/infection during neutropenic period  Description: INTERVENTIONS:  - Monitor WBC    Outcome: Progressing     Problem: METABOLIC, FLUID AND ELECTROLYTES - ADULT  Goal: Electrolytes maintained within normal limits  Description: INTERVENTIONS:  - Monitor labs and assess patient for signs and symptoms of electrolyte imbalances  - Administer electrolyte replacement as ordered  - Monitor response to electrolyte replacements, including repeat lab results as appropriate  - Instruct patient on fluid and nutrition as appropriate  Outcome: Progressing  Goal: Fluid balance maintained  Description: INTERVENTIONS:  - Monitor labs   - Monitor I/O and WT  - Instruct patient on fluid and nutrition as appropriate  - Assess for signs & symptoms of volume excess or deficit  Outcome: Progressing     Problem: RESPIRATORY - ADULT  Goal: Achieves optimal ventilation and oxygenation  Description: INTERVENTIONS:  - Assess for changes in respiratory status  - Assess for changes in mentation and behavior  - Position to facilitate oxygenation and minimize respiratory effort  - Oxygen administered by appropriate delivery if ordered  - Initiate smoking cessation education as indicated  - Encourage broncho-pulmonary hygiene including cough, deep breathe, Incentive Spirometry  - Assess the need for suctioning and aspirate as needed  - Assess and instruct to report SOB or any respiratory difficulty  - Respiratory Therapy support as indicated  Outcome: Progressing     Problem: Nutrition/Hydration-ADULT  Goal: Nutrient/Hydration intake appropriate for improving, restoring or maintaining nutritional needs  Description: Monitor and assess patient's nutrition/hydration status for malnutrition. Collaborate with interdisciplinary team and initiate plan and interventions as ordered. Monitor patient's weight and dietary intake as ordered or per policy. Utilize nutrition screening tool and intervene as necessary. Determine patient's food preferences and provide high-protein, high-caloric foods as appropriate.      INTERVENTIONS:  - Monitor oral intake, urinary output, labs, and treatment plans  - Assess nutrition and hydration status and recommend course of action  - Evaluate amount of meals eaten  - Assist patient with eating if necessary   - Allow adequate time for meals  - Recommend/ encourage appropriate diets, oral nutritional supplements, and vitamin/mineral supplements  - Order, calculate, and assess calorie counts as needed  - Recommend, monitor, and adjust tube feedings and TPN/PPN based on assessed needs  - Assess need for intravenous fluids  - Provide specific nutrition/hydration education as appropriate  - Include patient/family/caregiver in decisions related to nutrition  Outcome: Progressing

## 2023-08-03 NOTE — ASSESSMENT & PLAN NOTE
Right exudative pleural effusion with history of CKD/CHF on diuretics  · Pulmonary following, appreciate input  · Status post chest tube placement, still draining  · Continue home dose diuretics  · Encourage incentive spirometry  · Aspiration precautions  · Plan for pleurx catheter placement Monday 8/7 by IR and chest tube removal  · Family considering hospice at rehab

## 2023-08-03 NOTE — ASSESSMENT & PLAN NOTE
· Diabetes mellitus prior to admission on januvia and glimepiride  · Monitor Accu-Cheks AC plus at bedtime with lispro insulin sliding scale coverage  · Diabetic diet    Results from last 7 days   Lab Units 08/04/23  0510 08/03/23  0438 08/02/23  0621 08/01/23  0501   GLUCOSE RANDOM mg/dL 373* 310* 185* 137

## 2023-08-03 NOTE — SPEECH THERAPY NOTE
SLP Progress Note    Patient Name: Beau Mosqueda  HVBOL'Q Date: 8/3/2023     Problem List  Principal Problem:    Aspiration pneumonia ([address])  Active Problems:    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 ([address])    Recent pericardial effusion    Chronic combined systolic and diastolic congestive heart failure (HCC)    Atrial fibrillation with slow ventricular response (HCC)    Acute kidney injury superimposed on chronic kidney disease ([address])    Anemia    Recent empyema of lung with persistent locuted R hydropneumothorax    Pleural effusion exudative    Subjective:  "That's good" referring to his diet ginger ale    Objective:  Pt was seen for diagnostic dysphagia therapy to ensure tolerance of current diet (puree/NTL) and to assess potential for safe diet upgrade. Pt was alert (eyes open and sustained), positioned upright, followed all simpel commands today. Pt was assessed with pureed meat/mashed potatoes, magic cup ice cream thin diet gingerale and clarke crackers. Mastication was mildly slowed but effective. Bolus formation and transfer were effective. Swallow initiation appeared prompt. Laryngeal rise was good by palpation. No coughing, throat clearing, c/o stasis, multiple swallows, change in vocal quality noted c po intake today. Assessment:  MS, swallow skills improving. Risk for aspiration reduced. Plan/Recommendations:  Consider upgrade to NDD2/mechanical soft diet, thin liquids. SLP to continue f/u min of 2x weekly to ensure tolerance of least restrictive diet.     Staci Castellanos [address] [place] [address] [phone]

## 2023-08-03 NOTE — CASE MANAGEMENT
Case Management Discharge Planning Note    Patient name Singh Mcdonough  Location 2 [facility]  [address] MRN [id number]  : 1940 Date 8/3/2023       Current Admission Date: 2023  Current Admission Diagnosis:Aspiration pneumonia [facility])   Patient Active Problem List    Diagnosis Date Noted   • Pleural effusion exudative 2023   • Sepsis ([address]) 2023   • Goals of care, counseling/discussion 2023   • Aspiration pneumonia ([address]) 2023   • Duodenal ulcer 07/10/2023   • Encephalopathy 2023   • Recent empyema of lung with persistent locuted R hydropneumothorax 2023   • Hypoglycemia 2023   • Thrombocytopenia ([address]) 2023   • Diarrhea 2023   • Hypothermia 2023   • Pleural effusion, right side 2023   • Septic shock ([address]) 2023   • Urinary retention 2023   • Macrocytosis 2023   • Hyponatremia 2023   • Anemia 2023   • Chronic kidney disease-mineral and bone disorder 2023   • Advanced care planning/counseling discussion 2023   • Acute kidney injury superimposed on chronic kidney disease ([address]) 2022   • Benign hypertension with CKD (chronic kidney disease) stage IV ([address]) 2022   • Persistent proteinuria 2022   • COVID-19 2022   • Electrolyte abnormality 2022   • Cellulitis of left upper extremity 2021   • Atrial fibrillation with slow ventricular response ([address]) 2021   • Vitamin D deficiency 10/18/2019   • Chronic combined systolic and diastolic congestive heart failure ([address]) 2019   • Recent pericardial effusion 2019   • Leg edema 01/10/2019   • 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 ([address]) 01/10/2019   • PVC (premature ventricular contraction) 2018   • Essential hypertension 2018   • Closed traumatic displaced fracture of distal end of left radius with malunion 2018      LOS (days): 7  Geometric Mean LOS (GMLOS) (days): 5.00  Days to GMLOS:-2     OBJECTIVE:  Risk of Unplanned Readmission Score: 36.67     Current admission status: Inpatient   Preferred Pharmacy:   [facility] [address] [place] - [address] [facility] [address] [place] [address] [address]  [address] [facility] [address]  Phone: [phone] Fax: [phone]    Primary Care Provider: Vivek Espinosa DO    Primary Insurance: MEDICARE  Secondary Insurance: BLUE CROSS    DISCHARGE DETAILS:    Other Referral/Resources/Interventions Provided:  Interventions: Short Term Rehab  Referral Comments: Pt accepted back by [facility]. Facility will be reserved in [place] as requested by son.     Treatment Team Recommendation: Facility Return, Short Term Rehab  Discharge Destination Plan[de-identified] Facility Return, Short Term Rehab  Transport at Discharge : [facility] Ambulance

## 2023-08-03 NOTE — ASSESSMENT & PLAN NOTE
· Sees nephrology as an outpatient baseline creatinine closer to 2.3  · Takes torsemide 40 mg daily at home, monitor on home dose    Results from last 7 days   Lab Units 08/04/23  0510 08/03/23  0438 08/02/23  0164 08/01/23  0501 07/31/23  0453 07/30/23  0537 07/29/23  0631   BUN mg/dL 35* 33* 30* 32* 32* 32* 34*   CREATININE mg/dL 2.26* 2.20* 2.14* 2.28* 2.29* 2.38* 2.68*   EGFR ml/min/1.73sq m 25 26 27 25 25 24 20

## 2023-08-04 LAB
ALBUMIN SERPL BCP-MCNC: 2.6 G/DL (ref 3.5–5)
ALP SERPL-CCNC: 138 U/L (ref 34–104)
ALT SERPL W P-5'-P-CCNC: 10 U/L (ref 7–52)
ANION GAP SERPL CALCULATED.3IONS-SCNC: 8 MMOL/L
AST SERPL W P-5'-P-CCNC: 10 U/L (ref 13–39)
BACTERIA SPT RESP CULT: ABNORMAL
BASOPHILS # BLD AUTO: 0.03 THOUSANDS/ÂΜL (ref 0–0.1)
BASOPHILS NFR BLD AUTO: 1 % (ref 0–1)
BILIRUB SERPL-MCNC: 0.42 MG/DL (ref 0.2–1)
BUN SERPL-MCNC: 35 MG/DL (ref 5–25)
CALCIUM ALBUM COR SERPL-MCNC: 8.9 MG/DL (ref 8.3–10.1)
CALCIUM SERPL-MCNC: 7.8 MG/DL (ref 8.4–10.2)
CHLORIDE SERPL-SCNC: 97 MMOL/L (ref 96–108)
CO2 SERPL-SCNC: 26 MMOL/L (ref 21–32)
CREAT SERPL-MCNC: 2.26 MG/DL (ref 0.6–1.3)
EOSINOPHIL # BLD AUTO: 0.03 THOUSAND/ÂΜL (ref 0–0.61)
EOSINOPHIL NFR BLD AUTO: 1 % (ref 0–6)
ERYTHROCYTE [DISTWIDTH] IN BLOOD BY AUTOMATED COUNT: 15.7 % (ref 11.6–15.1)
GFR SERPL CREATININE-BSD FRML MDRD: 25 ML/MIN/1.73SQ M
GLUCOSE SERPL-MCNC: 373 MG/DL (ref 65–140)
GRAM STN SPEC: ABNORMAL
HCT VFR BLD AUTO: 28.3 % (ref 36.5–49.3)
HGB BLD-MCNC: 9 G/DL (ref 12–17)
IMM GRANULOCYTES # BLD AUTO: 0.04 THOUSAND/UL (ref 0–0.2)
IMM GRANULOCYTES NFR BLD AUTO: 1 % (ref 0–2)
LYMPHOCYTES # BLD AUTO: 1.28 THOUSANDS/ÂΜL (ref 0.6–4.47)
LYMPHOCYTES NFR BLD AUTO: 24 % (ref 14–44)
MAGNESIUM SERPL-MCNC: 1.7 MG/DL (ref 1.9–2.7)
MCH RBC QN AUTO: 33.1 PG (ref 26.8–34.3)
MCHC RBC AUTO-ENTMCNC: 31.8 G/DL (ref 31.4–37.4)
MCV RBC AUTO: 104 FL (ref 82–98)
MONOCYTES # BLD AUTO: 0.76 THOUSAND/ÂΜL (ref 0.17–1.22)
MONOCYTES NFR BLD AUTO: 14 % (ref 4–12)
NEUTROPHILS # BLD AUTO: 3.12 THOUSANDS/ÂΜL (ref 1.85–7.62)
NEUTS SEG NFR BLD AUTO: 59 % (ref 43–75)
NRBC BLD AUTO-RTO: 0 /100 WBCS
PHOSPHATE SERPL-MCNC: 2.7 MG/DL (ref 2.3–4.1)
PLATELET # BLD AUTO: 121 THOUSANDS/UL (ref 149–390)
PMV BLD AUTO: 10.3 FL (ref 8.9–12.7)
POTASSIUM SERPL-SCNC: 4 MMOL/L (ref 3.5–5.3)
PROT SERPL-MCNC: 5 G/DL (ref 6.4–8.4)
RBC # BLD AUTO: 2.72 MILLION/UL (ref 3.88–5.62)
SODIUM SERPL-SCNC: 131 MMOL/L (ref 135–147)
WBC # BLD AUTO: 5.26 THOUSAND/UL (ref 4.31–10.16)

## 2023-08-04 PROCEDURE — 94760 N-INVAS EAR/PLS OXIMETRY 1: CPT

## 2023-08-04 PROCEDURE — 94640 AIRWAY INHALATION TREATMENT: CPT

## 2023-08-04 PROCEDURE — 84100 ASSAY OF PHOSPHORUS: CPT | Performed by: NURSE PRACTITIONER

## 2023-08-04 PROCEDURE — 99232 SBSQ HOSP IP/OBS MODERATE 35: CPT

## 2023-08-04 PROCEDURE — 80053 COMPREHEN METABOLIC PANEL: CPT | Performed by: NURSE PRACTITIONER

## 2023-08-04 PROCEDURE — 85025 COMPLETE CBC W/AUTO DIFF WBC: CPT | Performed by: NURSE PRACTITIONER

## 2023-08-04 PROCEDURE — 99232 SBSQ HOSP IP/OBS MODERATE 35: CPT | Performed by: INTERNAL MEDICINE

## 2023-08-04 PROCEDURE — NC001 PR NO CHARGE: Performed by: RADIOLOGY

## 2023-08-04 PROCEDURE — 83735 ASSAY OF MAGNESIUM: CPT | Performed by: NURSE PRACTITIONER

## 2023-08-04 RX ORDER — MAGNESIUM SULFATE HEPTAHYDRATE 40 MG/ML
2 INJECTION, SOLUTION INTRAVENOUS ONCE
Status: COMPLETED | OUTPATIENT
Start: 2023-08-04 | End: 2023-08-05

## 2023-08-04 RX ADMIN — MAGNESIUM SULFATE HEPTAHYDRATE 2 G: 40 INJECTION, SOLUTION INTRAVENOUS at 08:03

## 2023-08-04 RX ADMIN — TAMSULOSIN HYDROCHLORIDE 0.4 MG: 0.4 CAPSULE ORAL at 17:05

## 2023-08-04 RX ADMIN — ATORVASTATIN CALCIUM 40 MG: 40 TABLET, FILM COATED ORAL at 17:05

## 2023-08-04 RX ADMIN — ACETAMINOPHEN 975 MG: 650 SUSPENSION ORAL at 10:06

## 2023-08-04 RX ADMIN — ACETAMINOPHEN 975 MG: 650 SUSPENSION ORAL at 01:47

## 2023-08-04 RX ADMIN — LEVALBUTEROL HYDROCHLORIDE 1.25 MG: 1.25 SOLUTION RESPIRATORY (INHALATION) at 01:56

## 2023-08-04 RX ADMIN — LEVALBUTEROL HYDROCHLORIDE 1.25 MG: 1.25 SOLUTION RESPIRATORY (INHALATION) at 19:02

## 2023-08-04 RX ADMIN — ACETAMINOPHEN 975 MG: 650 SUSPENSION ORAL at 17:05

## 2023-08-04 RX ADMIN — LEVALBUTEROL HYDROCHLORIDE 1.25 MG: 1.25 SOLUTION RESPIRATORY (INHALATION) at 13:26

## 2023-08-04 RX ADMIN — TORSEMIDE 40 MG: 20 TABLET ORAL at 08:24

## 2023-08-04 RX ADMIN — ALLOPURINOL 100 MG: 100 TABLET ORAL at 17:05

## 2023-08-04 RX ADMIN — HYDROMORPHONE HYDROCHLORIDE 0.2 MG: 0.2 INJECTION, SOLUTION INTRAMUSCULAR; INTRAVENOUS; SUBCUTANEOUS at 21:19

## 2023-08-04 RX ADMIN — LEVALBUTEROL HYDROCHLORIDE 1.25 MG: 1.25 SOLUTION RESPIRATORY (INHALATION) at 07:09

## 2023-08-04 RX ADMIN — ALLOPURINOL 100 MG: 100 TABLET ORAL at 08:23

## 2023-08-04 RX ADMIN — Medication 2.5 MG: at 20:00

## 2023-08-04 RX ADMIN — APIXABAN 2.5 MG: 2.5 TABLET, FILM COATED ORAL at 17:05

## 2023-08-04 RX ADMIN — TIMOLOL MALEATE 1 DROP: 5 SOLUTION/ DROPS OPHTHALMIC at 08:26

## 2023-08-04 RX ADMIN — APIXABAN 2.5 MG: 2.5 TABLET, FILM COATED ORAL at 08:23

## 2023-08-04 NOTE — CASE MANAGEMENT
Case Management Discharge Planning Note    Patient name Darclotilde Alert  Location 2 [facility]  [address] MRN [id number]  : 1940 Date 2023       Current Admission Date: 2023  Current Admission Diagnosis:Aspiration pneumonia [facility])   Patient Active Problem List    Diagnosis Date Noted   • Pleural effusion exudative 2023   • Sepsis ([address]) 2023   • Goals of care, counseling/discussion 2023   • Aspiration pneumonia ([address]) 2023   • Duodenal ulcer 07/10/2023   • Encephalopathy 2023   • Recent empyema of lung with persistent locuted R hydropneumothorax 2023   • Hypoglycemia 2023   • Thrombocytopenia ([address]) 2023   • Diarrhea 2023   • Hypothermia 2023   • Pleural effusion, right side 2023   • Septic shock ([address]) 2023   • Urinary retention 2023   • Macrocytosis 2023   • Hyponatremia 2023   • Anemia 2023   • Chronic kidney disease-mineral and bone disorder 2023   • Advanced care planning/counseling discussion 2023   • Acute kidney injury superimposed on chronic kidney disease ([address]) 2022   • Benign hypertension with CKD (chronic kidney disease) stage IV ([address]) 2022   • Persistent proteinuria 2022   • COVID-19 2022   • Electrolyte abnormality 2022   • Cellulitis of left upper extremity 2021   • Atrial fibrillation with slow ventricular response ([address]) 2021   • Vitamin D deficiency 10/18/2019   • Chronic combined systolic and diastolic congestive heart failure ([address]) 2019   • Recent pericardial effusion 2019   • Leg edema 01/10/2019   • 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 ([address]) 01/10/2019   • PVC (premature ventricular contraction) 2018   • Essential hypertension 2018   • Closed traumatic displaced fracture of distal end of left radius with malunion 2018      LOS (days): 8  Geometric Mean LOS (GMLOS) (days): 5.00  Days to GMLOS:-2.7     OBJECTIVE:  Risk of Unplanned Readmission Score: 36.65     Current admission status: Inpatient   Preferred Pharmacy:   [place] [address], [address] [place] [address]  Phone: [phone] Fax: [phone]    Primary Care Provider: Ltteresea Client, DO    Primary Insurance: MEDICARE  Secondary Insurance: BLUE CROSS    DISCHARGE DETAILS:    Discharge planning discussed with[de-identified] SonJovannyCosmeallie Zuniga of Choice: Yes  Comments - Freedom of Choice: SW following to assist with planning. SW met with son to review plan and IMM. Son's plan is for pt to return to [facility] to continue skilled rehab when discharged. SW will continue to follow to monitor progress and assist with transfer when ready. CM contacted family/caregiver?: Yes    Contacts  Patient Contacts: Dimple Up  Relationship to Patient[de-identified] Family  Contact Method: In Person  Reason/Outcome: Discharge Planning    Other Referral/Resources/Interventions Provided:  Interventions: Short Term Rehab  Referral Comments: Plans in place for pt to return to [facility] when discharged    Treatment Team Recommendation: Facility Return, Short Term Rehab  Discharge Destination Plan[de-identified] Facility Return, Short Term Rehab  Transport at Discharge : BLS Ambulance    IMM Given (Date):: 08/04/23  IMM Given to[de-identified] Family (IMM #2 reviewed with pt's son. Son verbalized understanding. Son signed IMM and copy given.   Copy also placed in scan bin for chart.)

## 2023-08-04 NOTE — TELEMEDICINE
e-Consult (IPC)  - Interventional Radiology  Jordan Roy 78 y.o. male MRN: [id number]  Unit/Bed#: [address] Encounter: [id number]          Interventional Radiology has been consulted to evaluate Jordan Roy    We were consulted by pulmonology concerning this patient with exudative right pleural effusion. Inpatient Consult to IR  Consult performed by: Rekha Amezcua MD  Consult ordered by: Kaylene Cabrales MD        08/04/23    Assessment/Recommendation:   79 yo male with repeated admissions for aspiration pneumonia and empyema. He has multiple other medical problems including DM, Afib, CHF, urinary retention and CKD/KARINA. He has been treated for the empyema previously with chest tube and TPA/Dornase, but this admission re-accumulated the effusion, which has a thick pleural rind, but no growth on cultures this admission. The lung is trapped, however. He continues to have 200-250 ml from his chest tube daily. An Asept catheter has been requested for palliation because the pt is not a candidate for a decortication. In the absence of a better option, I think this is reasonable: Serial thoracentesis only results in hydropneumothorax. Will plan for Monday. Orders placed. 11-20 minutes, >50% of the total time devoted to medical consultative verbal/EMR discussion between providers. Written report will be generated in the EMR. Thank you for allowing Interventional Radiology to participate in the care of Jordan Roy. Please don't hesitate to call or TigerText us with any questions.      Rekha Amezcua MD

## 2023-08-04 NOTE — PLAN OF CARE
Problem: INFECTION - ADULT  Goal: Absence or prevention of progression during hospitalization  Description: INTERVENTIONS:  - Assess and monitor for signs and symptoms of infection  - Monitor lab/diagnostic results  - Monitor all insertion sites, i.e. indwelling lines, tubes, and drains  - Monitor endotracheal if appropriate and nasal secretions for changes in amount and color  - [place] appropriate cooling/warming therapies per order  - Administer medications as ordered  - Instruct and encourage patient and family to use good hand hygiene technique  - Identify and instruct in appropriate isolation precautions for identified infection/condition  Outcome: Progressing  Goal: Absence of fever/infection during neutropenic period  Description: INTERVENTIONS:  - Monitor WBC    Outcome: Progressing     Problem: METABOLIC, FLUID AND ELECTROLYTES - ADULT  Goal: Electrolytes maintained within normal limits  Description: INTERVENTIONS:  - Monitor labs and assess patient for signs and symptoms of electrolyte imbalances  - Administer electrolyte replacement as ordered  - Monitor response to electrolyte replacements, including repeat lab results as appropriate  - Instruct patient on fluid and nutrition as appropriate  Outcome: Progressing  Goal: Fluid balance maintained  Description: INTERVENTIONS:  - Monitor labs   - Monitor I/O and WT  - Instruct patient on fluid and nutrition as appropriate  - Assess for signs & symptoms of volume excess or deficit  Outcome: Progressing     Problem: RESPIRATORY - ADULT  Goal: Achieves optimal ventilation and oxygenation  Description: INTERVENTIONS:  - Assess for changes in respiratory status  - Assess for changes in mentation and behavior  - Position to facilitate oxygenation and minimize respiratory effort  - Oxygen administered by appropriate delivery if ordered  - Initiate smoking cessation education as indicated  - Encourage broncho-pulmonary hygiene including cough, deep breathe, Incentive Spirometry  - Assess the need for suctioning and aspirate as needed  - Assess and instruct to report SOB or any respiratory difficulty  - Respiratory Therapy support as indicated  Outcome: Progressing     Problem: Nutrition/Hydration-ADULT  Goal: Nutrient/Hydration intake appropriate for improving, restoring or maintaining nutritional needs  Description: Monitor and assess patient's nutrition/hydration status for malnutrition. Collaborate with interdisciplinary team and initiate plan and interventions as ordered. Monitor patient's weight and dietary intake as ordered or per policy. Utilize nutrition screening tool and intervene as necessary. Determine patient's food preferences and provide high-protein, high-caloric foods as appropriate.      INTERVENTIONS:  - Monitor oral intake, urinary output, labs, and treatment plans  - Assess nutrition and hydration status and recommend course of action  - Evaluate amount of meals eaten  - Assist patient with eating if necessary   - Allow adequate time for meals  - Recommend/ encourage appropriate diets, oral nutritional supplements, and vitamin/mineral supplements  - Order, calculate, and assess calorie counts as needed  - Recommend, monitor, and adjust tube feedings and TPN/PPN based on assessed needs  - Assess need for intravenous fluids  - Provide specific nutrition/hydration education as appropriate  - Include patient/family/caregiver in decisions related to nutrition  Outcome: Progressing     Problem: Prexisting or High Potential for Compromised Skin Integrity  Goal: Skin integrity is maintained or improved  Description: INTERVENTIONS:  - Identify patients at risk for skin breakdown  - Assess and monitor skin integrity  - Assess and monitor nutrition and hydration status  - Monitor labs   - Assess for incontinence   - Turn and reposition patient  - Assist with mobility/ambulation  - Relieve pressure over bony prominences  - Avoid friction and shearing  - Provide appropriate hygiene as needed including keeping skin clean and dry  - Evaluate need for skin moisturizer/barrier cream  - Collaborate with interdisciplinary team   - Patient/family teaching  - Consider wound care consult   Outcome: Progressing

## 2023-08-04 NOTE — PROGRESS NOTES
[address] [facility]  Progress Note  Name: Eve Cid  MRN: [id number]  Unit/Bed#: 2 [place] [address] A  Date of Admission: 7/27/2023   Date of Service: 8/4/2023 I Hospital Day: 8    Assessment/Plan   * Aspiration pneumonia [facility])  Assessment & Plan  History of atrial fibrillation congestive heart failure CKD 4 diabetes and recent pericardial effusion presents to the hospital with shortness of breath found to have recurrent right exudative pleural effusion/parapneumonic effusion  · Sepsis on admission secondary to aspiration pneumonia and effusion  · Completed 7 days cefepime and metronidazole  · Chest tube in place being managed by pulmonology  · Dysphagia: Status post video barium swallow  · Speech recommendations: mechanical soft diet with thin liquids    Pleural effusion exudative  Assessment & Plan  Right exudative pleural effusion with history of CKD/CHF on diuretics  · Pulmonary following, appreciate input  · Status post chest tube placement, still draining  · Continue home dose diuretics  · Encourage incentive spirometry  · Aspiration precautions  · Plan for pleurx catheter placement Monday 8/7 by IR and chest tube removal  · Family considering hospice at rehab    Recent empyema of lung with persistent locuted R hydropneumothorax  Assessment & Plan  · empyema of the lung requiring chest tube placement on 6/24 and removal on 7/7. Received tPA/dornase administration and 7-day course of cefepime during previous admission. · Chest tube placed again on 7/28 by IR due to persistent right hydropneumothorax. Pleural effusion PH 7.8 with no evidence of recurrent empyema. · Cultures showing no growth   · IR and pulmonary following for chest tube management. · Complaining of pain around chest tube site, will add low-dose oxycodone and IV Dilaudid as needed. Monitor mental status closely    Anemia  Assessment & Plan  · Anemia without evidence of blood loss. Likely chronic disease.   · Improving with diuresis    Results from last 7 days   Lab Units 08/04/23  0510 08/03/23  0438 08/02/23  0621 08/01/23  0501   HEMOGLOBIN g/dL 9.0* 10.0* 9.2* 8.3*       Acute kidney injury superimposed on chronic kidney disease ([address] )  Assessment & Plan  · Sees nephrology as an outpatient baseline creatinine closer to 2.3  · Takes torsemide 40 mg daily at home, monitor on home dose    Results from last 7 days   Lab Units 08/04/23  0510 08/03/23  0438 08/02/23  6678 08/01/23  0501 07/31/23  0453 07/30/23  0537 07/29/23  0631   BUN mg/dL 35* 33* 30* 32* 32* 32* 34*   CREATININE mg/dL 2.26* 2.20* 2.14* 2.28* 2.29* 2.38* 2.68*   EGFR ml/min/1.73sq m 25 26 27 25 25 24 20       Atrial fibrillation with slow ventricular response (HCC)  Assessment & Plan  · Intrinsically rate controlled. Continue eliquis    Chronic combined systolic and diastolic congestive heart failure (HCC)  Assessment & Plan  · Combined CHF   · Serous output in chest tube + moist cough  · Given Lasix 40 mg IV x1 8/3  · Continue home Torsemide 40 mg daily  · Monitor I+O, daily weights    Wt Readings from Last 3 Encounters:   07/27/23 68.1 kg (150 lb 1.6 oz)   07/10/23 64.9 kg (143 lb 1.3 oz)   06/28/23 74.5 kg (164 lb 3.9 oz)       Recent pericardial effusion  Assessment & Plan  · Recent pericardiocentesis 6/30 for tamponade. Recovered    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 ([address])  Assessment & Plan  · Diabetes mellitus prior to admission on januvia and glimepiride  · Monitor Accu-Cheks AC plus at bedtime with lispro insulin sliding scale coverage  · Diabetic diet    Results from last 7 days   Lab Units 08/04/23  0510 08/03/23  0438 08/02/23  0621 08/01/23  0501   GLUCOSE RANDOM mg/dL 373* 310* 185* 137          VTE Pharmacologic Prophylaxis: VTE Score: 7 High Risk (Score >/= 5) - Pharmacological DVT Prophylaxis Ordered: apixaban (Eliquis). Sequential Compression Devices Ordered.     Patient Centered Rounds: I performed bedside rounds with nursing staff today. Discussions with Specialists or Other Care Team Provider: pulmonology, rn, cm    Education and Discussions with Family / Patient: Updated  (son) at bedside. Total Time Spent on Date of Encounter in care of patient: 45 minutes This time was spent on one or more of the following: performing physical exam; counseling and coordination of care; obtaining or reviewing history; documenting in the medical record; reviewing/ordering tests, medications or procedures; communicating with other healthcare professionals and discussing with patient's family/caregivers. Current Length of Stay: 8 day(s)  Current Patient Status: Inpatient   Certification Statement: The patient will continue to require additional inpatient hospital stay due to pleurx catheter placement monday  Discharge Plan: Anticipate discharge in >72 hrs to rehab facility. Code Status: Level 3 - DNAR and DNI    Subjective:   Patient seen and examined at bedside this morning. He reports improvement in his cough. Denied any other complaints including shortness of breath or chest pain. No pain at chest tube site. Objective:     Vitals:   Temp (24hrs), Av.1 °F (36.2 °C), Min:96.7 °F (35.9 °C), Max:97.5 °F (36.4 °C)    Temp:  [96.7 °F (35.9 °C)-97.5 °F (36.4 °C)] 97.5 °F (36.4 °C)  HR:  [] 113  Resp:  [18-20] 18  BP: (115-132)/(58-74) 125/70  SpO2:  [97 %-100 %] 98 %  Body mass index is 22.17 kg/m². Input and Output Summary (last 24 hours): Intake/Output Summary (Last 24 hours) at 2023 1338  Last data filed at 2023 0601  Gross per 24 hour   Intake --   Output 440 ml   Net -440 ml       Physical Exam:   Physical Exam  Vitals and nursing note reviewed. Constitutional:       General: He is not in acute distress. Appearance: He is well-developed. Cardiovascular:      Rate and Rhythm: Normal rate and regular rhythm.    Pulmonary:      Effort: Pulmonary effort is normal. No respiratory distress. Breath sounds: Examination of the right-lower field reveals decreased breath sounds. Decreased breath sounds present. Comments: R chest tube in place  Abdominal:      Palpations: Abdomen is soft. Tenderness: There is no abdominal tenderness. Musculoskeletal:         General: No swelling. Skin:     General: Skin is warm and dry. Capillary Refill: Capillary refill takes less than 2 seconds. Neurological:      Mental Status: He is alert. Psychiatric:         Mood and Affect: Mood normal.         Additional Data:     Labs:  Results from last 7 days   Lab Units 08/04/23  0510   WBC Thousand/uL 5.26   HEMOGLOBIN g/dL 9.0*   HEMATOCRIT % 28.3*   PLATELETS Thousands/uL 121*   NEUTROS PCT % 59   LYMPHS PCT % 24   MONOS PCT % 14*   EOS PCT % 1     Results from last 7 days   Lab Units 08/04/23  0510   SODIUM mmol/L 131*   POTASSIUM mmol/L 4.0   CHLORIDE mmol/L 97   CO2 mmol/L 26   BUN mg/dL 35*   CREATININE mg/dL 2.26*   ANION GAP mmol/L 8   CALCIUM mg/dL 7.8*   ALBUMIN g/dL 2.6*   TOTAL BILIRUBIN mg/dL 0.42   ALK PHOS U/L 138*   ALT U/L 10   AST U/L 10*   GLUCOSE RANDOM mg/dL 373*                 Results from last 7 days   Lab Units 08/03/23  0438 08/01/23  0501   PROCALCITONIN ng/ml 0.10 0.12       Lines/Drains:  Invasive Devices     Peripheral Intravenous Line  Duration           Peripheral IV 08/02/23 Distal;Dorsal (posterior); Right Forearm 2 days          Drain  Duration           External Urinary Catheter Small 25 days    Chest Tube 1 Right Pleural 10 Fr. 6 days    External Urinary Catheter 5 days                      Imaging: No pertinent imaging reviewed.     Recent Cultures (last 7 days):   Results from last 7 days   Lab Units 08/02/23  0918 08/02/23  0912 07/28/23  1633   SPUTUM CULTURE  4+ Growth of  --   --    GRAM STAIN RESULT  1+ Epithelial cells per low power field*  No polys seen*  1+ Gram positive cocci in pairs*  --  No Polys or Bacteria seen   BODY FLUID CULTURE, STERILE   --   --  No growth   LEGIONELLA URINARY ANTIGEN   --  Negative  --        Last 24 Hours Medication List:   Current Facility-Administered Medications   Medication Dose Route Frequency Provider Last Rate   • acetaminophen  975 mg Oral Q8H Marsha Morales MD     • allopurinol  100 mg Oral BID Larissa Jose [place], DO     • apixaban  2.5 mg Oral BID Larissa Jose [place], DO     • atorvastatin  40 mg Oral Daily With Dinner Kenney Higgins, DO     • docusate sodium  100 mg Oral BID PRN Marsha Morales MD     • HYDROmorphone  0.2 mg Intravenous Q4H PRN Marsha Morales MD     • levalbuterol  1.25 mg Nebulization Q6H SUSANA White     • ondansetron  4 mg Intravenous Q6H PRN Larissa Main, DO     • oxyCODONE  2.5 mg Oral Q4H PRN Marsha Morales MD     • tamsulosin  0.4 mg Oral Daily With Dinner Larissa Main, DO     • timolol  1 drop Both Eyes Daily Kenney Higgins, DO     • torsemide  40 mg Oral Daily SUSANA Borrego          Today, Patient Was Seen By: Zoya Costello PA-C    **Please Note: This note may have been constructed using a voice recognition system. **

## 2023-08-04 NOTE — PROGRESS NOTES
Progress Note - Pulmonary   Addy Daly 78 y.o. male MRN: [id number]  Unit/Bed#: 2 [place] [address] A Encounter: [id number]    Assessment:  Right exudative pleural effusion: CKD on diuretics   Parapneumonic effusion  Status post chest tube placed: still draining     Plan:  Cont chest tube drainage: Discussed option of pleurx with son Keon Wahl at bedside. He is also exploring possible option of hospice at long term NH; plan for pleurx on 8/7/23: D/w Dr. Alejandro Cherry diuresis as per renal   Aspiration precaution        Subjective:   Pt seen and examined at bedside. Pt is afebrile. He denies any new complaints. Objective:     Vitals: Blood pressure 125/70, pulse (!) 113, temperature 97.5 °F (36.4 °C), temperature source Axillary, resp. rate 18, height 5' 9" (1.753 m), weight 68.1 kg (150 lb 1.6 oz), SpO2 98 %. ,Body mass index is 22.17 kg/m². Intake/Output Summary (Last 24 hours) at 8/4/2023 1326  Last data filed at 8/4/2023 0601  Gross per 24 hour   Intake --   Output 440 ml   Net -440 ml       Invasive Devices     Peripheral Intravenous Line  Duration           Peripheral IV 08/02/23 Distal;Dorsal (posterior); Right Forearm 2 days          Drain  Duration           External Urinary Catheter Small 25 days    Chest Tube 1 Right Pleural 10 Fr. 6 days    External Urinary Catheter 5 days                Physical Exam:     General: Awake follows simple command, appears debilitated   HEENT: atrumatic head  Lungs: decreased breath sound at the right lung base, no rales or wheezing; chest tube   CVS: S1S2+, no m/r/g  Abdomen: soft, Nd, NT  Ext; no edema  Neuro: awake follows simple command       Labs: I have personally reviewed pertinent lab results. Imaging and other studies: I have personally reviewed pertinent reports.    and I have personally reviewed pertinent films in PACS

## 2023-08-05 LAB
ALBUMIN SERPL BCP-MCNC: 2.7 G/DL (ref 3.5–5)
ALP SERPL-CCNC: 126 U/L (ref 34–104)
ALT SERPL W P-5'-P-CCNC: 10 U/L (ref 7–52)
ANION GAP SERPL CALCULATED.3IONS-SCNC: 6 MMOL/L
AST SERPL W P-5'-P-CCNC: 11 U/L (ref 13–39)
BASOPHILS # BLD AUTO: 0.03 THOUSANDS/ÂΜL (ref 0–0.1)
BASOPHILS NFR BLD AUTO: 1 % (ref 0–1)
BILIRUB SERPL-MCNC: 0.54 MG/DL (ref 0.2–1)
BUN SERPL-MCNC: 34 MG/DL (ref 5–25)
CALCIUM ALBUM COR SERPL-MCNC: 9.2 MG/DL (ref 8.3–10.1)
CALCIUM SERPL-MCNC: 8.2 MG/DL (ref 8.4–10.2)
CHLORIDE SERPL-SCNC: 96 MMOL/L (ref 96–108)
CO2 SERPL-SCNC: 28 MMOL/L (ref 21–32)
CREAT SERPL-MCNC: 2.37 MG/DL (ref 0.6–1.3)
EOSINOPHIL # BLD AUTO: 0.03 THOUSAND/ÂΜL (ref 0–0.61)
EOSINOPHIL NFR BLD AUTO: 1 % (ref 0–6)
ERYTHROCYTE [DISTWIDTH] IN BLOOD BY AUTOMATED COUNT: 16.1 % (ref 11.6–15.1)
GFR SERPL CREATININE-BSD FRML MDRD: 24 ML/MIN/1.73SQ M
GLUCOSE SERPL-MCNC: 152 MG/DL (ref 65–140)
GLUCOSE SERPL-MCNC: 243 MG/DL (ref 65–140)
GLUCOSE SERPL-MCNC: 282 MG/DL (ref 65–140)
GLUCOSE SERPL-MCNC: 303 MG/DL (ref 65–140)
GLUCOSE SERPL-MCNC: 315 MG/DL (ref 65–140)
HCT VFR BLD AUTO: 27.5 % (ref 36.5–49.3)
HGB BLD-MCNC: 9.2 G/DL (ref 12–17)
IMM GRANULOCYTES # BLD AUTO: 0.03 THOUSAND/UL (ref 0–0.2)
IMM GRANULOCYTES NFR BLD AUTO: 1 % (ref 0–2)
LYMPHOCYTES # BLD AUTO: 1.27 THOUSANDS/ÂΜL (ref 0.6–4.47)
LYMPHOCYTES NFR BLD AUTO: 27 % (ref 14–44)
MAGNESIUM SERPL-MCNC: 2 MG/DL (ref 1.9–2.7)
MCH RBC QN AUTO: 35.5 PG (ref 26.8–34.3)
MCHC RBC AUTO-ENTMCNC: 33.5 G/DL (ref 31.4–37.4)
MCV RBC AUTO: 106 FL (ref 82–98)
MONOCYTES # BLD AUTO: 0.66 THOUSAND/ÂΜL (ref 0.17–1.22)
MONOCYTES NFR BLD AUTO: 14 % (ref 4–12)
NEUTROPHILS # BLD AUTO: 2.65 THOUSANDS/ÂΜL (ref 1.85–7.62)
NEUTS SEG NFR BLD AUTO: 56 % (ref 43–75)
NRBC BLD AUTO-RTO: 0 /100 WBCS
PHOSPHATE SERPL-MCNC: 2.6 MG/DL (ref 2.3–4.1)
PLATELET # BLD AUTO: 120 THOUSANDS/UL (ref 149–390)
PMV BLD AUTO: 10.5 FL (ref 8.9–12.7)
POTASSIUM SERPL-SCNC: 4.1 MMOL/L (ref 3.5–5.3)
PROT SERPL-MCNC: 5.1 G/DL (ref 6.4–8.4)
RBC # BLD AUTO: 2.59 MILLION/UL (ref 3.88–5.62)
SODIUM SERPL-SCNC: 130 MMOL/L (ref 135–147)
WBC # BLD AUTO: 4.67 THOUSAND/UL (ref 4.31–10.16)

## 2023-08-05 PROCEDURE — 99232 SBSQ HOSP IP/OBS MODERATE 35: CPT | Performed by: INTERNAL MEDICINE

## 2023-08-05 PROCEDURE — 94664 DEMO&/EVAL PT USE INHALER: CPT

## 2023-08-05 PROCEDURE — 99232 SBSQ HOSP IP/OBS MODERATE 35: CPT

## 2023-08-05 PROCEDURE — 94760 N-INVAS EAR/PLS OXIMETRY 1: CPT

## 2023-08-05 PROCEDURE — 83735 ASSAY OF MAGNESIUM: CPT | Performed by: NURSE PRACTITIONER

## 2023-08-05 PROCEDURE — 80053 COMPREHEN METABOLIC PANEL: CPT | Performed by: NURSE PRACTITIONER

## 2023-08-05 PROCEDURE — 82948 REAGENT STRIP/BLOOD GLUCOSE: CPT

## 2023-08-05 PROCEDURE — 85025 COMPLETE CBC W/AUTO DIFF WBC: CPT | Performed by: NURSE PRACTITIONER

## 2023-08-05 PROCEDURE — 84100 ASSAY OF PHOSPHORUS: CPT | Performed by: NURSE PRACTITIONER

## 2023-08-05 PROCEDURE — 94640 AIRWAY INHALATION TREATMENT: CPT

## 2023-08-05 RX ORDER — INSULIN LISPRO 100 [IU]/ML
1-5 INJECTION, SOLUTION INTRAVENOUS; SUBCUTANEOUS
Status: DISCONTINUED | OUTPATIENT
Start: 2023-08-05 | End: 2023-08-08

## 2023-08-05 RX ADMIN — LEVALBUTEROL HYDROCHLORIDE 1.25 MG: 1.25 SOLUTION RESPIRATORY (INHALATION) at 13:39

## 2023-08-05 RX ADMIN — INSULIN LISPRO 3 UNITS: 100 INJECTION, SOLUTION INTRAVENOUS; SUBCUTANEOUS at 08:17

## 2023-08-05 RX ADMIN — LEVALBUTEROL HYDROCHLORIDE 1.25 MG: 1.25 SOLUTION RESPIRATORY (INHALATION) at 01:00

## 2023-08-05 RX ADMIN — INSULIN LISPRO 1 UNITS: 100 INJECTION, SOLUTION INTRAVENOUS; SUBCUTANEOUS at 16:34

## 2023-08-05 RX ADMIN — TAMSULOSIN HYDROCHLORIDE 0.4 MG: 0.4 CAPSULE ORAL at 16:33

## 2023-08-05 RX ADMIN — APIXABAN 2.5 MG: 2.5 TABLET, FILM COATED ORAL at 17:00

## 2023-08-05 RX ADMIN — APIXABAN 2.5 MG: 2.5 TABLET, FILM COATED ORAL at 08:22

## 2023-08-05 RX ADMIN — INSULIN LISPRO 3 UNITS: 100 INJECTION, SOLUTION INTRAVENOUS; SUBCUTANEOUS at 11:27

## 2023-08-05 RX ADMIN — ACETAMINOPHEN 975 MG: 650 SUSPENSION ORAL at 08:22

## 2023-08-05 RX ADMIN — TIMOLOL MALEATE 1 DROP: 5 SOLUTION/ DROPS OPHTHALMIC at 08:22

## 2023-08-05 RX ADMIN — LEVALBUTEROL HYDROCHLORIDE 1.25 MG: 1.25 SOLUTION RESPIRATORY (INHALATION) at 07:00

## 2023-08-05 RX ADMIN — LEVALBUTEROL HYDROCHLORIDE 1.25 MG: 1.25 SOLUTION RESPIRATORY (INHALATION) at 19:58

## 2023-08-05 RX ADMIN — TORSEMIDE 40 MG: 20 TABLET ORAL at 08:22

## 2023-08-05 RX ADMIN — ALLOPURINOL 100 MG: 100 TABLET ORAL at 08:22

## 2023-08-05 RX ADMIN — INSULIN LISPRO 2 UNITS: 100 INJECTION, SOLUTION INTRAVENOUS; SUBCUTANEOUS at 21:34

## 2023-08-05 RX ADMIN — ALLOPURINOL 100 MG: 100 TABLET ORAL at 17:00

## 2023-08-05 RX ADMIN — ATORVASTATIN CALCIUM 40 MG: 40 TABLET, FILM COATED ORAL at 16:33

## 2023-08-05 RX ADMIN — ACETAMINOPHEN 975 MG: 650 SUSPENSION ORAL at 16:51

## 2023-08-05 NOTE — ASSESSMENT & PLAN NOTE
· Diabetes mellitus prior to admission on januvia and glimepiride  · Monitor Accu-Cheks AC plus at bedtime with lispro insulin sliding scale coverage  · Diabetic diet    Results from last 7 days   Lab Units 08/05/23  0502 08/04/23  0510 08/03/23  0438 08/02/23  0621   GLUCOSE RANDOM mg/dL 282* 373* 310* 185*

## 2023-08-05 NOTE — PROGRESS NOTES
Progress Note - Pulmonary   Abraham Portillo 78 y.o. male MRN: [id number]  Unit/Bed#: 2 [place] [address] A Encounter: [id number]    Assessment:  Right exudative pleural effusion: CKD on diuretics   Parapneumonic effusion  Status post chest tube placed: still draining     Plan:  Cont chest tube drainage (still has avg 200 cc per day drainage): Discussed option of pleurx with son Mila Ortega at bedside. He is also exploring possible option of hospice at long term NH; plan for pleurx on 8/7/23: D/w Dr. Richard Merlin diuresis as per renal   Aspiration precaution        Subjective:   Pt seen and examined at bedside. Pt is afebrile. He denies any new complaints. Objective:     Vitals: Blood pressure 102/54, pulse (!) 109, temperature 98.1 °F (36.7 °C), temperature source Oral, resp. rate 18, height 5' 9" (1.753 m), weight 68.1 kg (150 lb 1.6 oz), SpO2 95 %. ,Body mass index is 22.17 kg/m². Intake/Output Summary (Last 24 hours) at 8/5/2023 1327  Last data filed at 8/5/2023 0555  Gross per 24 hour   Intake --   Output 195 ml   Net -195 ml       Invasive Devices     Peripheral Intravenous Line  Duration           Peripheral IV 08/02/23 Distal;Dorsal (posterior); Right Forearm 3 days          Drain  Duration           External Urinary Catheter Small 26 days    Chest Tube 1 Right Pleural 10 Fr. 7 days    External Urinary Catheter 6 days                Physical Exam:     General: Awake follows simple command, appears debilitated   HEENT: atrumatic head  Lungs: decreased breath sound at the right lung base, no rales or wheezing; chest tube   CVS: S1S2+, no m/r/g  Abdomen: soft, Nd, NT  Ext; no edema  Neuro: awake follows simple command       Labs: I have personally reviewed pertinent lab results. Imaging and other studies: I have personally reviewed pertinent reports.    and I have personally reviewed pertinent films in PACS

## 2023-08-05 NOTE — ASSESSMENT & PLAN NOTE
· Anemia without evidence of blood loss. Likely chronic disease.   · Improving with diuresis    Results from last 7 days   Lab Units 08/05/23  0502 08/04/23  0510 08/03/23  0438 08/02/23  0621   HEMOGLOBIN g/dL 9.2* 9.0* 10.0* 9.2*

## 2023-08-05 NOTE — PROGRESS NOTES
[id number] [facility]  Progress Note  Name: Javier Méndez  MRN: [id number]  Unit/Bed#: [address] [place] [address] A  Date of Admission: 7/27/2023   Date of Service: 8/5/2023 I Hospital Day: 9    Assessment/Plan   * Aspiration pneumonia [facility])  Assessment & Plan  History of atrial fibrillation congestive heart failure CKD 4 diabetes and recent pericardial effusion presents to the hospital with shortness of breath found to have recurrent right exudative pleural effusion/parapneumonic effusion  · Sepsis on admission secondary to aspiration pneumonia and effusion  · Completed 7 days cefepime and metronidazole  · Chest tube in place being managed by pulmonology  · Dysphagia: Status post video barium swallow  · Speech recommendations: mechanical soft diet with thin liquids    Pleural effusion exudative  Assessment & Plan  Right exudative pleural effusion with history of CKD/CHF on diuretics  · Pulmonary following, appreciate input  · Status post chest tube placement, still draining  · Continue home dose diuretics  · Encourage incentive spirometry  · Aspiration precautions  · Plan for pleurx catheter placement Monday 8/7 by IR and chest tube removal  · Family plans to transition to hospice at rehab    Recent empyema of lung with persistent locuted R hydropneumothorax  Assessment & Plan  · empyema of the lung requiring chest tube placement on 6/24 and removal on 7/7. Received tPA/dornase administration and 7-day course of cefepime during previous admission. · Chest tube placed again on 7/28 by IR due to persistent right hydropneumothorax. Pleural effusion PH 7.8 with no evidence of recurrent empyema. · Cultures showing no growth   · IR and pulmonary following for chest tube management. · Complaining of pain around chest tube site, will add low-dose oxycodone and IV Dilaudid as needed. Monitor mental status closely    Anemia  Assessment & Plan  · Anemia without evidence of blood loss.   Likely chronic disease. · Improving with diuresis    Results from last 7 days   Lab Units 08/05/23  0502 08/04/23  0510 08/03/23  0438 08/02/23  0621   HEMOGLOBIN g/dL 9.2* 9.0* 10.0* 9.2*       Acute kidney injury superimposed on chronic kidney disease ([address])  Assessment & Plan  · Sees nephrology as an outpatient baseline creatinine closer to 2.3  · Takes torsemide 40 mg daily at home, monitor on home dose    Results from last 7 days   Lab Units 08/05/23  0502 08/04/23  0510 08/03/23  0438 08/02/23  7389 08/01/23  0501 07/31/23  0453 07/30/23  0537   BUN mg/dL 34* 35* 33* 30* 32* 32* 32*   CREATININE mg/dL 2.37* 2.26* 2.20* 2.14* 2.28* 2.29* 2.38*   EGFR ml/min/1.73sq m 24 25 26 27 25 25 24       Atrial fibrillation with slow ventricular response (HCC)  Assessment & Plan  · Intrinsically rate controlled. Continue eliquis    Chronic combined systolic and diastolic congestive heart failure (HCC)  Assessment & Plan  · Combined CHF   · Serous output in chest tube + moist cough  · Given Lasix 40 mg IV x1 8/3  · Continue home Torsemide 40 mg daily  · Monitor I+O, daily weights    Wt Readings from Last 3 Encounters:   07/27/23 68.1 kg (150 lb 1.6 oz)   07/10/23 64.9 kg (143 lb 1.3 oz)   06/28/23 74.5 kg (164 lb 3.9 oz)       Recent pericardial effusion  Assessment & Plan  · Recent pericardiocentesis 6/30 for tamponade. Recovered    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 ([address])  Assessment & Plan  · Diabetes mellitus prior to admission on januvia and glimepiride  · Monitor Accu-Cheks AC plus at bedtime with lispro insulin sliding scale coverage  · Diabetic diet    Results from last 7 days   Lab Units 08/05/23  0502 08/04/23  0510 08/03/23  0438 08/02/23  6257   GLUCOSE RANDOM mg/dL 282* 373* 310* 185*              VTE Pharmacologic Prophylaxis: VTE Score: 7 High Risk (Score >/= 5) - Pharmacological DVT Prophylaxis Ordered: apixaban (Eliquis). Sequential Compression Devices Ordered.     Patient Centered Rounds: I performed bedside rounds with nursing staff today. Discussions with Specialists or Other Care Team Provider: rn    Education and Discussions with Family / Patient: will update at bedside. Total Time Spent on Date of Encounter in care of patient: 25 minutes This time was spent on one or more of the following: performing physical exam; counseling and coordination of care; obtaining or reviewing history; documenting in the medical record; reviewing/ordering tests, medications or procedures; communicating with other healthcare professionals and discussing with patient's family/caregivers. Current Length of Stay: 9 day(s)  Current Patient Status: Inpatient   Certification Statement: The patient will continue to require additional inpatient hospital stay due to pleurx catheter placement monday  Discharge Plan: Anticipate discharge in 48 hrs to rehab facility. Code Status: Level 3 - DNAR and DNI    Subjective:   Patient seen and examined at bedside this morning. Appeared comfortable, answering questions appropriately. Oriented to self. Patient unsure he was in the hospital but when told said "oh yeah [facility]." Denied any chest pain or shortness of breath. Reports he is eating well. Objective:     Vitals:   Temp (24hrs), Av.9 °F (36.6 °C), Min:97.3 °F (36.3 °C), Max:98.9 °F (37.2 °C)    Temp:  [97.3 °F (36.3 °C)-98.9 °F (37.2 °C)] 98.1 °F (36.7 °C)  HR:  [] 109  Resp:  [18] 18  BP: ()/(54-71) 102/54  SpO2:  [84 %-98 %] 95 %  Body mass index is 22.17 kg/m². Input and Output Summary (last 24 hours): Intake/Output Summary (Last 24 hours) at 2023 1024  Last data filed at 2023 0555  Gross per 24 hour   Intake --   Output 195 ml   Net -195 ml       Physical Exam:   Physical Exam  Vitals and nursing note reviewed. Constitutional:       General: He is not in acute distress. Appearance: He is well-developed. He is ill-appearing.    Cardiovascular:      Rate and Rhythm: Normal rate and regular rhythm. Pulmonary:      Effort: Pulmonary effort is normal. No respiratory distress. Breath sounds: Examination of the right-lower field reveals decreased breath sounds. Decreased breath sounds present. Comments: Chest tube in place  Abdominal:      Palpations: Abdomen is soft. Tenderness: There is no abdominal tenderness. Musculoskeletal:         General: No swelling. Skin:     General: Skin is warm and dry. Capillary Refill: Capillary refill takes less than 2 seconds. Neurological:      Mental Status: He is alert. Psychiatric:         Mood and Affect: Mood normal.         Additional Data:     Labs:  Results from last 7 days   Lab Units 08/05/23  0502   WBC Thousand/uL 4.67   HEMOGLOBIN g/dL 9.2*   HEMATOCRIT % 27.5*   PLATELETS Thousands/uL 120*   NEUTROS PCT % 56   LYMPHS PCT % 27   MONOS PCT % 14*   EOS PCT % 1     Results from last 7 days   Lab Units 08/05/23  0502   SODIUM mmol/L 130*   POTASSIUM mmol/L 4.1   CHLORIDE mmol/L 96   CO2 mmol/L 28   BUN mg/dL 34*   CREATININE mg/dL 2.37*   ANION GAP mmol/L 6   CALCIUM mg/dL 8.2*   ALBUMIN g/dL 2.7*   TOTAL BILIRUBIN mg/dL 0.54   ALK PHOS U/L 126*   ALT U/L 10   AST U/L 11*   GLUCOSE RANDOM mg/dL 282*         Results from last 7 days   Lab Units 08/05/23  0808   POC GLUCOSE mg/dl 303*         Results from last 7 days   Lab Units 08/03/23  0438 08/01/23  0501   PROCALCITONIN ng/ml 0.10 0.12       Lines/Drains:  Invasive Devices     Peripheral Intravenous Line  Duration           Peripheral IV 08/02/23 Distal;Dorsal (posterior); Right Forearm 2 days          Drain  Duration           External Urinary Catheter Small 26 days    Chest Tube 1 Right Pleural 10 Fr. 7 days    External Urinary Catheter 6 days                      Imaging: No pertinent imaging reviewed.     Recent Cultures (last 7 days):   Results from last 7 days   Lab Units 08/02/23  0918 08/02/23  0912   SPUTUM CULTURE  4+ Growth of  --    [place] Resides STAIN RESULT  1+ Epithelial cells per low power field*  No polys seen*  1+ Gram positive cocci in pairs*  --    LEGIONELLA URINARY ANTIGEN   --  Negative       Last 24 Hours Medication List:   Current Facility-Administered Medications   Medication Dose Route Frequency Provider Last Rate   • acetaminophen  975 mg Oral Q8H Lc Jaramillo MD     • allopurinol  100 mg Oral BID Gaylyn Abhinav, DO     • apixaban  2.5 mg Oral BID Gayn Abhinav, DO     • atorvastatin  40 mg Oral Daily With Dinner Kenney Oseguera DO     • docusate sodium  100 mg Oral BID PRN Lc Jaramillo MD     • HYDROmorphone  0.2 mg Intravenous Q4H PRN Lc Jaramillo MD     • insulin lispro  1-5 Units Subcutaneous TID AC Jamal Romero PA-C     • insulin lispro  1-5 Units Subcutaneous HS Jamal Romero PA-C     • levalbuterol  1.25 mg Nebulization Q6H SUSANA Crowder     • ondansetron  4 mg Intravenous Q6H PRN Wilma Peñalozal, DO     • oxyCODONE  2.5 mg Oral Q4H PRN Lc Jaramillo MD     • tamsulosin  0.4 mg Oral Daily With Dinner Codiealan La, DO     • timolol  1 drop Both Eyes Daily Kenney Oseguera DO     • torsemide  40 mg Oral Daily SUSANA Honeycutt          Today, Patient Was Seen By: Jamal Romero PA-C    **Please Note: This note may have been constructed using a voice recognition system. **

## 2023-08-05 NOTE — ASSESSMENT & PLAN NOTE
· Sees nephrology as an outpatient baseline creatinine closer to 2.3  · Takes torsemide 40 mg daily at home, monitor on home dose    Results from last 7 days   Lab Units 08/05/23  0502 08/04/23  0510 08/03/23  0438 08/02/23  9479 08/01/23  0501 07/31/23  0453 07/30/23  0537   BUN mg/dL 34* 35* 33* 30* 32* 32* 32*   CREATININE mg/dL 2.37* 2.26* 2.20* 2.14* 2.28* 2.29* 2.38*   EGFR ml/min/1.73sq m 24 25 26 27 25 25 24

## 2023-08-05 NOTE — ASSESSMENT & PLAN NOTE
Right exudative pleural effusion with history of CKD/CHF on diuretics  · Pulmonary following, appreciate input  · Status post chest tube placement, still draining  · Continue home dose diuretics  · Encourage incentive spirometry  · Aspiration precautions  · Plan for pleurx catheter placement Monday 8/7 by IR and chest tube removal  · Family plans to transition to hospice at rehab

## 2023-08-05 NOTE — PLAN OF CARE
Problem: INFECTION - ADULT  Goal: Absence or prevention of progression during hospitalization  Description: INTERVENTIONS:  - Assess and monitor for signs and symptoms of infection  - Monitor lab/diagnostic results  - Monitor all insertion sites, i.e. indwelling lines, tubes, and drains  - Monitor endotracheal if appropriate and nasal secretions for changes in amount and color  - [place] appropriate cooling/warming therapies per order  - Administer medications as ordered  - Instruct and encourage patient and family to use good hand hygiene technique  - Identify and instruct in appropriate isolation precautions for identified infection/condition  Outcome: Progressing  Goal: Absence of fever/infection during neutropenic period  Description: INTERVENTIONS:  - Monitor WBC    Outcome: Progressing     Problem: METABOLIC, FLUID AND ELECTROLYTES - ADULT  Goal: Electrolytes maintained within normal limits  Description: INTERVENTIONS:  - Monitor labs and assess patient for signs and symptoms of electrolyte imbalances  - Administer electrolyte replacement as ordered  - Monitor response to electrolyte replacements, including repeat lab results as appropriate  - Instruct patient on fluid and nutrition as appropriate  Outcome: Progressing  Goal: Fluid balance maintained  Description: INTERVENTIONS:  - Monitor labs   - Monitor I/O and WT  - Instruct patient on fluid and nutrition as appropriate  - Assess for signs & symptoms of volume excess or deficit  Outcome: Progressing     Problem: RESPIRATORY - ADULT  Goal: Achieves optimal ventilation and oxygenation  Description: INTERVENTIONS:  - Assess for changes in respiratory status  - Assess for changes in mentation and behavior  - Position to facilitate oxygenation and minimize respiratory effort  - Oxygen administered by appropriate delivery if ordered  - Initiate smoking cessation education as indicated  - Encourage broncho-pulmonary hygiene including cough, deep breathe, Incentive Spirometry  - Assess the need for suctioning and aspirate as needed  - Assess and instruct to report SOB or any respiratory difficulty  - Respiratory Therapy support as indicated  Outcome: Progressing     Problem: Nutrition/Hydration-ADULT  Goal: Nutrient/Hydration intake appropriate for improving, restoring or maintaining nutritional needs  Description: Monitor and assess patient's nutrition/hydration status for malnutrition. Collaborate with interdisciplinary team and initiate plan and interventions as ordered. Monitor patient's weight and dietary intake as ordered or per policy. Utilize nutrition screening tool and intervene as necessary. Determine patient's food preferences and provide high-protein, high-caloric foods as appropriate.      INTERVENTIONS:  - Monitor oral intake, urinary output, labs, and treatment plans  - Assess nutrition and hydration status and recommend course of action  - Evaluate amount of meals eaten  - Assist patient with eating if necessary   - Allow adequate time for meals  - Recommend/ encourage appropriate diets, oral nutritional supplements, and vitamin/mineral supplements  - Order, calculate, and assess calorie counts as needed  - Recommend, monitor, and adjust tube feedings and TPN/PPN based on assessed needs  - Assess need for intravenous fluids  - Provide specific nutrition/hydration education as appropriate  - Include patient/family/caregiver in decisions related to nutrition  Outcome: Progressing

## 2023-08-06 LAB
ANION GAP SERPL CALCULATED.3IONS-SCNC: 5 MMOL/L
BUN SERPL-MCNC: 34 MG/DL (ref 5–25)
CALCIUM SERPL-MCNC: 7.9 MG/DL (ref 8.4–10.2)
CHLORIDE SERPL-SCNC: 97 MMOL/L (ref 96–108)
CO2 SERPL-SCNC: 30 MMOL/L (ref 21–32)
CREAT SERPL-MCNC: 2.32 MG/DL (ref 0.6–1.3)
ERYTHROCYTE [DISTWIDTH] IN BLOOD BY AUTOMATED COUNT: 16.3 % (ref 11.6–15.1)
GFR SERPL CREATININE-BSD FRML MDRD: 24 ML/MIN/1.73SQ M
GLUCOSE SERPL-MCNC: 182 MG/DL (ref 65–140)
GLUCOSE SERPL-MCNC: 196 MG/DL (ref 65–140)
GLUCOSE SERPL-MCNC: 308 MG/DL (ref 65–140)
GLUCOSE SERPL-MCNC: 333 MG/DL (ref 65–140)
GLUCOSE SERPL-MCNC: 341 MG/DL (ref 65–140)
HCT VFR BLD AUTO: 28.9 % (ref 36.5–49.3)
HGB BLD-MCNC: 9.3 G/DL (ref 12–17)
MCH RBC QN AUTO: 32.7 PG (ref 26.8–34.3)
MCHC RBC AUTO-ENTMCNC: 32.2 G/DL (ref 31.4–37.4)
MCV RBC AUTO: 102 FL (ref 82–98)
PLATELET # BLD AUTO: 126 THOUSANDS/UL (ref 149–390)
PMV BLD AUTO: 10.4 FL (ref 8.9–12.7)
POTASSIUM SERPL-SCNC: 3.8 MMOL/L (ref 3.5–5.3)
RBC # BLD AUTO: 2.84 MILLION/UL (ref 3.88–5.62)
SODIUM SERPL-SCNC: 132 MMOL/L (ref 135–147)
WBC # BLD AUTO: 4.35 THOUSAND/UL (ref 4.31–10.16)

## 2023-08-06 PROCEDURE — 97110 THERAPEUTIC EXERCISES: CPT

## 2023-08-06 PROCEDURE — 97530 THERAPEUTIC ACTIVITIES: CPT

## 2023-08-06 PROCEDURE — 99232 SBSQ HOSP IP/OBS MODERATE 35: CPT | Performed by: INTERNAL MEDICINE

## 2023-08-06 PROCEDURE — 94760 N-INVAS EAR/PLS OXIMETRY 1: CPT

## 2023-08-06 PROCEDURE — 94640 AIRWAY INHALATION TREATMENT: CPT

## 2023-08-06 PROCEDURE — 82948 REAGENT STRIP/BLOOD GLUCOSE: CPT

## 2023-08-06 PROCEDURE — 99232 SBSQ HOSP IP/OBS MODERATE 35: CPT

## 2023-08-06 PROCEDURE — 85027 COMPLETE CBC AUTOMATED: CPT

## 2023-08-06 PROCEDURE — 80048 BASIC METABOLIC PNL TOTAL CA: CPT

## 2023-08-06 RX ADMIN — INSULIN LISPRO 3 UNITS: 100 INJECTION, SOLUTION INTRAVENOUS; SUBCUTANEOUS at 11:45

## 2023-08-06 RX ADMIN — ALLOPURINOL 100 MG: 100 TABLET ORAL at 17:06

## 2023-08-06 RX ADMIN — LEVALBUTEROL HYDROCHLORIDE 1.25 MG: 1.25 SOLUTION RESPIRATORY (INHALATION) at 07:28

## 2023-08-06 RX ADMIN — INSULIN LISPRO 1 UNITS: 100 INJECTION, SOLUTION INTRAVENOUS; SUBCUTANEOUS at 08:15

## 2023-08-06 RX ADMIN — APIXABAN 2.5 MG: 2.5 TABLET, FILM COATED ORAL at 08:13

## 2023-08-06 RX ADMIN — LEVALBUTEROL HYDROCHLORIDE 1.25 MG: 1.25 SOLUTION RESPIRATORY (INHALATION) at 20:09

## 2023-08-06 RX ADMIN — INSULIN LISPRO 4 UNITS: 100 INJECTION, SOLUTION INTRAVENOUS; SUBCUTANEOUS at 21:33

## 2023-08-06 RX ADMIN — ALLOPURINOL 100 MG: 100 TABLET ORAL at 08:13

## 2023-08-06 RX ADMIN — TAMSULOSIN HYDROCHLORIDE 0.4 MG: 0.4 CAPSULE ORAL at 17:08

## 2023-08-06 RX ADMIN — ACETAMINOPHEN 975 MG: 650 SUSPENSION ORAL at 17:06

## 2023-08-06 RX ADMIN — LEVALBUTEROL HYDROCHLORIDE 1.25 MG: 1.25 SOLUTION RESPIRATORY (INHALATION) at 14:12

## 2023-08-06 RX ADMIN — TORSEMIDE 40 MG: 20 TABLET ORAL at 08:14

## 2023-08-06 RX ADMIN — ACETAMINOPHEN 975 MG: 650 SUSPENSION ORAL at 08:18

## 2023-08-06 RX ADMIN — TIMOLOL MALEATE 1 DROP: 5 SOLUTION/ DROPS OPHTHALMIC at 08:15

## 2023-08-06 RX ADMIN — INSULIN LISPRO 4 UNITS: 100 INJECTION, SOLUTION INTRAVENOUS; SUBCUTANEOUS at 17:07

## 2023-08-06 RX ADMIN — ATORVASTATIN CALCIUM 40 MG: 40 TABLET, FILM COATED ORAL at 17:06

## 2023-08-06 RX ADMIN — LEVALBUTEROL HYDROCHLORIDE 1.25 MG: 1.25 SOLUTION RESPIRATORY (INHALATION) at 02:12

## 2023-08-06 RX ADMIN — APIXABAN 2.5 MG: 2.5 TABLET, FILM COATED ORAL at 17:07

## 2023-08-06 NOTE — PHYSICAL THERAPY NOTE
PT TREATMENT     08/06/23 0925   PT Last Visit   PT Visit Date 08/06/23   Note Type   Note Type Treatment   Pain Assessment   Pain Assessment Tool Vergara-Baker FACES   Vergara-Baker FACES Pain Rating 4   Pain Location/Orientation Location: Generalized  (pt c/o pain with exercises of LEs ; cannot describe where pain is coming from.)   Restrictions/Precautions   Weight Bearing Precautions Per Order No   Other Precautions Chair Alarm; Bed Alarm;Multiple lines; Fall Risk;Pain  (chest tube)   General   Chart Reviewed Yes   Family/Caregiver Present No   Cognition   Overall Cognitive Status WFL   Arousal/Participation Lethargic   Attention Attends with cues to redirect   Following Commands Follows one step commands with increased time or repetition   Subjective   Subjective Pt c/o pain with LE exercises (but cannot say what hurts: PT asked if it was in his legs or at his chest tube site: pt  could not determine: "I don't know")   Bed Mobility   Supine to Sit 3  Moderate assistance   Additional items HOB elevated; Increased time required;LE management   Additional Comments Pt in chair at end of session   Transfers   Sit to Stand 3  Moderate assistance   Additional items Assist x 2   Stand to Sit 3  Moderate assistance   Additional items Assist x 2   Ambulation/Elevation   Gait pattern Narrow MILLY; Forward Flexion; Step to;Short stride   Gait Assistance 3  Moderate assist   Additional items Assist x 2   Assistive Device Rolling walker   Distance 5 feet   Stair Management Assistance Not tested   Activity Tolerance   Activity Tolerance Patient limited by fatigue   Nurse Made Aware yes: [place] Cook: pt in chair with seat alarm activated. Exercises   Heelslides Sitting;10 reps;AAROM; Bilateral  (modified with less hip flexion)   Hip Abduction Sitting;AAROM; Bilateral  (10 on right and 8 on left)   Ankle Pumps Sitting;10 reps;Bilateral   Assessment   Prognosis Good   Problem List Decreased strength;Decreased endurance; Impaired balance;Decreased mobility; Decreased skin integrity;Pain   Assessment Pt is lethargic once in chair and beginning exercises. Pt tolerated transfer to chair, however very weak. Will continue PT to progress as tolerated. All lines intact at end of session. The patient's AM-PAC Basic Mobility Inpatient Short Form Raw Score is 11. A Raw score of less than or equal to 16 suggests the patient may benefit from discharge to post-acute rehabilitation services. Please also refer to the recommendation of the Physical Therapist for safe discharge planning. Plan   Treatment/Interventions ADL retraining;Functional transfer training;LE strengthening/ROM; Therapeutic exercise; Endurance training;Patient/family training;Equipment eval/education; Bed mobility;Gait training;Spoke to nursing;OT   Progress Progressing toward goals   PT Frequency Other (Comment)  (5/wk)   Recommendation   PT Discharge Recommendation Post acute rehabilitation services   Additional Comments Pt requested to stop exercises on left leg, due to unidentified pain. AM-PAC Basic Mobility Inpatient   Turning in Flat Bed Without Bedrails 2   Lying on Back to Sitting on Edge of Flat Bed Without Bedrails 2   Moving Bed to Chair 2   Standing Up From Chair Using Arms 2   Walk in Room 2   Climb 3-5 Stairs With Railing 1   Basic Mobility Inpatient Raw Score 11   Basic Mobility Standardized Score 30.25   Highest Level Of Mobility   JH-HLM Goal 4: Move to chair/commode   JH-HLM Achieved 4: Move to chair/commode   End of Consult   Patient Position at End of Consult Bedside chair;Bed/Chair alarm activated; All needs within reach   Licensure   [address] [place] [address] Number  Paige Cake. Williemae Hodgkins PT  [id number]

## 2023-08-06 NOTE — ASSESSMENT & PLAN NOTE
· empyema of the lung requiring chest tube placement on 6/24 and removal on 7/7. Received tPA/dornase administration and 7-day course of cefepime during previous admission. · Chest tube placed again on 7/28 by IR due to persistent right hydropneumothorax. Pleural effusion PH 7.8 with no evidence of recurrent empyema.     · Cultures showing no growth   · IR and pulmonary following for chest tube management

## 2023-08-06 NOTE — PROGRESS NOTES
[address]  Progress Note  Name: Eva Hills  MRN: [id number]  Unit/Bed#: 2 [place] [address] A  Date of Admission: 7/27/2023   Date of Service: 8/6/2023 I Hospital Day: 10    Assessment/Plan   * Aspiration pneumonia [facility])  Assessment & Plan  History of atrial fibrillation congestive heart failure CKD 4 diabetes and recent pericardial effusion presents to the hospital with shortness of breath found to have recurrent right exudative pleural effusion/parapneumonic effusion  · Sepsis on admission secondary to aspiration pneumonia and effusion  · Completed 7 days cefepime and metronidazole  · Chest tube in place being managed by pulmonology  · Dysphagia: Status post video barium swallow  · Speech recommendations: mechanical soft diet with thin liquids    Pleural effusion exudative  Assessment & Plan  Right exudative pleural effusion with history of CKD/CHF on diuretics  · Pulmonary following, appreciate input  · Status post chest tube placement, still draining  · Continue home dose diuretics  · Encourage incentive spirometry  · Aspiration precautions  · Plan for chest tube removal and pleurx catheter placement Monday 8/7 by IR  · Family plans to transition to hospice at rehab    Recent empyema of lung with persistent locuted R hydropneumothorax  Assessment & Plan  · empyema of the lung requiring chest tube placement on 6/24 and removal on 7/7. Received tPA/dornase administration and 7-day course of cefepime during previous admission. · Chest tube placed again on 7/28 by IR due to persistent right hydropneumothorax. Pleural effusion PH 7.8 with no evidence of recurrent empyema. · Cultures showing no growth   · IR and pulmonary following for chest tube management    Anemia  Assessment & Plan  · Anemia without evidence of blood loss. Likely chronic disease.   · Improving with diuresis    Results from last 7 days   Lab Units 08/06/23  0505 08/05/23  0502 08/04/23  0510 08/03/23  0438   HEMOGLOBIN g/dL 9.3* 9.2* 9.0* 10.0*       Acute kidney injury superimposed on chronic kidney disease ([address])  Assessment & Plan  · Sees nephrology as an outpatient baseline creatinine closer to 2.3  · Takes torsemide 40 mg daily at home, monitor on home dose  · Cr currently at baseline    Results from last 7 days   Lab Units 08/06/23  0505 08/05/23  0502 08/04/23  0510 08/03/23  0438 08/02/23  0621 08/01/23  0501 07/31/23  0453   BUN mg/dL 34* 34* 35* 33* 30* 32* 32*   CREATININE mg/dL 2.32* 2.37* 2.26* 2.20* 2.14* 2.28* 2.29*   EGFR ml/min/1.73sq m 24 24 25 26 27 25 25       Atrial fibrillation with slow ventricular response (HCC)  Assessment & Plan  · Intrinsically rate controlled. Continue eliquis    Chronic combined systolic and diastolic congestive heart failure (HCC)  Assessment & Plan  · Combined CHF   · Serous output in chest tube + moist cough  · Given Lasix 40 mg IV x1 8/3  · Continue home Torsemide 40 mg daily  · Monitor I+O, daily weights    Wt Readings from Last 3 Encounters:   07/27/23 68.1 kg (150 lb 1.6 oz)   07/10/23 64.9 kg (143 lb 1.3 oz)   06/28/23 74.5 kg (164 lb 3.9 oz)       Recent pericardial effusion  Assessment & Plan  · Recent pericardiocentesis 6/30 for tamponade. Recovered    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 ([address])  Assessment & Plan  · Diabetes mellitus prior to admission on januvia and glimepiride  · Monitor Accu-Cheks AC plus at bedtime with lispro insulin sliding scale coverage  · Diabetic diet    Results from last 7 days   Lab Units 08/06/23  0505 08/05/23  0502 08/04/23  0510 08/03/23  0438   GLUCOSE RANDOM mg/dL 182* 282* 373* 310*            VTE Pharmacologic Prophylaxis: VTE Score: 7 High Risk (Score >/= 5) - Pharmacological DVT Prophylaxis Ordered: apixaban (Eliquis). Sequential Compression Devices Ordered. Patient Centered Rounds: I performed bedside rounds with nursing staff today.   Discussions with Specialists or Other Care Team Provider: rn    Education and Discussions with Family / Patient: Updated  (son) via phone. Total Time Spent on Date of Encounter in care of patient: 35 minutes This time was spent on one or more of the following: performing physical exam; counseling and coordination of care; obtaining or reviewing history; documenting in the medical record; reviewing/ordering tests, medications or procedures; communicating with other healthcare professionals and discussing with patient's family/caregivers. Current Length of Stay: 10 day(s)  Current Patient Status: Inpatient   Certification Statement: The patient will continue to require additional inpatient hospital stay due to chest tube removal, pleurx catheter placement  Discharge Plan: Anticipate discharge in 24-48 hrs to rehab facility. Code Status: Level 3 - DNAR and DNI    Subjective:   Patient seen sitting up in recliner doing leg exercises with PT. Patient reports feeling "good" today and denies any pain or shortness of breath. He is oriented to self. Patient again didn't know he was int [facility] but knew he was in [facility]. Objective:     Vitals:   Temp (24hrs), Av.7 °F (36.5 °C), Min:97.5 °F (36.4 °C), Max:97.9 °F (36.6 °C)    Temp:  [97.5 °F (36.4 °C)-97.9 °F (36.6 °C)] 97.6 °F (36.4 °C)  HR:  [89-98] 90  Resp:  [17-18] 18  BP: (108-125)/(58-69) 125/69  SpO2:  [96 %-100 %] 100 %  Body mass index is 22.17 kg/m². Input and Output Summary (last 24 hours): Intake/Output Summary (Last 24 hours) at 2023 0932  Last data filed at 2023 0507  Gross per 24 hour   Intake --   Output 895 ml   Net -895 ml       Physical Exam:   Physical Exam  Vitals and nursing note reviewed. Constitutional:       General: He is not in acute distress. Appearance: He is well-developed. Comments: satting mid 90s on room air sitting up in recliner   Cardiovascular:      Rate and Rhythm: Normal rate and regular rhythm.    Pulmonary:      Effort: Pulmonary effort is normal. No respiratory distress. Breath sounds: Examination of the right-lower field reveals decreased breath sounds. Decreased breath sounds present. Comments: R chest tube in place  Abdominal:      Palpations: Abdomen is soft. Tenderness: There is no abdominal tenderness. Musculoskeletal:         General: No swelling. Skin:     General: Skin is warm and dry. Capillary Refill: Capillary refill takes less than 2 seconds. Neurological:      Mental Status: He is alert. Mental status is at baseline. Comments: Oriented to self (did not know he was in hospital but knew he was in [facility])   Psychiatric:         Mood and Affect: Mood normal.         Additional Data:     Labs:  Results from last 7 days   Lab Units 08/06/23  0505 08/05/23  0502   WBC Thousand/uL 4.35 4.67   HEMOGLOBIN g/dL 9.3* 9.2*   HEMATOCRIT % 28.9* 27.5*   PLATELETS Thousands/uL 126* 120*   NEUTROS PCT %  --  56   LYMPHS PCT %  --  27   MONOS PCT %  --  14*   EOS PCT %  --  1     Results from last 7 days   Lab Units 08/06/23  0505 08/05/23  0502   SODIUM mmol/L 132* 130*   POTASSIUM mmol/L 3.8 4.1   CHLORIDE mmol/L 97 96   CO2 mmol/L 30 28   BUN mg/dL 34* 34*   CREATININE mg/dL 2.32* 2.37*   ANION GAP mmol/L 5 6   CALCIUM mg/dL 7.9* 8.2*   ALBUMIN g/dL  --  2.7*   TOTAL BILIRUBIN mg/dL  --  0.54   ALK PHOS U/L  --  126*   ALT U/L  --  10   AST U/L  --  11*   GLUCOSE RANDOM mg/dL 182* 282*         Results from last 7 days   Lab Units 08/06/23  0708 08/05/23  2034 08/05/23  1553 08/05/23  1104 08/05/23  0808   POC GLUCOSE mg/dl 196* 243* 152* 315* 303*         Results from last 7 days   Lab Units 08/03/23  0438 08/01/23  0501   PROCALCITONIN ng/ml 0.10 0.12       Lines/Drains:  Invasive Devices     Peripheral Intravenous Line  Duration           Peripheral IV 08/06/23 Dorsal (posterior); Left Forearm <1 day          Drain  Duration           External Urinary Catheter Small 27 days    Chest Tube 1 Right Pleural 10 Fr. 8 days    External Urinary Catheter 7 days                      Imaging: No pertinent imaging reviewed. Recent Cultures (last 7 days):   Results from last 7 days   Lab Units 08/02/23  0918 08/02/23  0912   SPUTUM CULTURE  4+ Growth of  --    GRAM STAIN RESULT  1+ Epithelial cells per low power field*  No polys seen*  1+ Gram positive cocci in pairs*  --    LEGIONELLA URINARY ANTIGEN   --  Negative       Last 24 Hours Medication List:   Current Facility-Administered Medications   Medication Dose Route Frequency Provider Last Rate   • acetaminophen  975 mg Oral Q8H Freeman Murcia MD     • allopurinol  100 mg Oral BID Siomara Moots, DO     • apixaban  2.5 mg Oral BID Siomara Moots, DO     • atorvastatin  40 mg Oral Daily With Dinner Kenney Rodriguez DO     • docusate sodium  100 mg Oral BID PRN Freeman Murcia MD     • HYDROmorphone  0.2 mg Intravenous Q4H PRN Freeman Murcia MD     • insulin lispro  1-5 Units Subcutaneous TID AC Michelle Oneil PA-C     • insulin lispro  1-5 Units Subcutaneous HS Michelle Oneil PA-C     • levalbuterol  1.25 mg Nebulization Q6H SUSANA Jimenes     • ondansetron  4 mg Intravenous Q6H PRN Siomara Sullivan, DO     • oxyCODONE  2.5 mg Oral Q4H PRN Freeman Murcia MD     • tamsulosin  0.4 mg Oral Daily With Dinner Siomarahéctor Sullivan, DO     • timolol  1 drop Both Eyes Daily Kenney Rodriguez DO     • torsemide  40 mg Oral Daily SUSANA Tellez          Today, Patient Was Seen By: Michelle Oneil PA-C    **Please Note: This note may have been constructed using a voice recognition system. **

## 2023-08-06 NOTE — OCCUPATIONAL THERAPY NOTE
OT TREATMENT         08/06/23 0900   Note Type   Note Type Treatment   Pain Assessment   Pain Assessment Tool 0-10   Pain Score No Pain   Restrictions/Precautions   Other Precautions Chair Alarm; Bed Alarm; Fall Risk  (chest tube)   ADL   Grooming Assistance 5  Supervision/Setup   Grooming Deficit Wash/dry face   Grooming Comments seated   Toileting Assistance  2  Maximal Assistance   Toileting Deficit Perineal hygiene   Toileting Comments incontinent of BM   Bed Mobility   Supine to Sit 3  Moderate assistance   Additional items Assist x 1;Verbal cues   Transfers   Sit to Stand 3  Moderate assistance   Additional items Assist x 2;Verbal cues   Stand to Sit 3  Moderate assistance   Additional items Assist x 2;Verbal cues   Stand pivot 3  Moderate assistance   Additional items Assist x 2  (bed to chair with RW)   ROM- Right Upper Extremities   R Shoulder AAROM; Flexion   R Elbow AROM;Elbow flexion;Elbow extension   R Hand AROM; Thumb; Index finger; Long finger;Ring finger;Little finger   R Weight/Reps/Sets 10 times each seated on edge of bed   ROM - Left Upper Extremities    L Shoulder AROM; Flexion   L Elbow AROM;Elbow flexion;Elbow extension   L Hand AROM; Thumb; Index finger;Ring finger; Long finger;Little finger   L Weight/Reps/Sets 10 times each seated on edge of bed   Cognition   Following Commands Follows one step commands with increased time or repetition   Assessment   Assessment Patient seen for OT treatment. Patient is generally weak and deconditioned requiring mod assist of 2 for transfers. Patient will benefit from continued OT services to maximize functional performance with ADLS. The patient's raw score on the -PAC Daily Activity Inpatient Short Form is 15. A raw score of less than 19 suggests the patient may benefit from discharge to post-acute rehabilitation services. Please refer to the recommendation of the Occupational Therapist for safe discharge planning.    Plan   Treatment Interventions ADL retraining;Functional transfer training;UE strengthening/ROM; Endurance training;Patient/family training;Equipment evaluation/education; Activityengagement   OT Frequency 3-5x/wk   Recommendation   OT Discharge Recommendation Post acute rehabilitation services   AM-PAC Daily Activity Inpatient   Lower Body Dressing 2   Bathing 2   Toileting 2   Upper Body Dressing 2   Grooming 3   Eating 4   Daily Activity Raw Score 15   Daily Activity Standardized Score (Calc for Raw Score >=11) 34.69   AM-PAC Applied Cognition Inpatient   Following a Speech/Presentation 3   Understanding Ordinary Conversation 3   Taking Medications 2   Remembering Where Things Are Placed or Put Away 3   Remembering List of 4-5 Errands 3   Taking Care of Complicated Tasks 2   Applied Cognition Raw Score 16   Applied Cognition Standardized Score 35.03   End of Consult   Patient Position at End of Consult Bedside chair;Bed/Chair alarm activated; All needs within reach   Nurse Communication Nurse aware of consult   Licensure   [place] License Number  [place] [id number] [facility] OTR/L [id number]

## 2023-08-06 NOTE — PLAN OF CARE
Problem: OCCUPATIONAL THERAPY ADULT  Goal: Performs self-care activities at highest level of function for planned discharge setting. See evaluation for individualized goals. Description: Treatment Interventions: ADL retraining, Functional transfer training, UE strengthening/ROM, Endurance training, Patient/family training, Equipment evaluation/education, Activityengagement          See flowsheet documentation for full assessment, interventions and recommendations. Outcome: Progressing  Note: Limitation: Decreased ADL status, Decreased UE strength, Decreased endurance, Decreased self-care trans, Decreased high-level ADLs  Prognosis: Good  Assessment: Patient seen for OT treatment. Patient is generally weak and deconditioned requiring mod assist of 2 for transfers. Patient will benefit from continued OT services to maximize functional performance with ADLS.      OT Discharge Recommendation: Post acute rehabilitation services

## 2023-08-06 NOTE — ASSESSMENT & PLAN NOTE
· Diabetes mellitus prior to admission on januvia and glimepiride  · Monitor Accu-Cheks AC plus at bedtime with lispro insulin sliding scale coverage  · Diabetic diet    Results from last 7 days   Lab Units 08/06/23  0505 08/05/23  0502 08/04/23  0510 08/03/23  0438   GLUCOSE RANDOM mg/dL 182* 282* 373* 310*

## 2023-08-06 NOTE — ASSESSMENT & PLAN NOTE
· Anemia without evidence of blood loss. Likely chronic disease.   · Improving with diuresis    Results from last 7 days   Lab Units 08/06/23  0505 08/05/23  0502 08/04/23  0510 08/03/23  0438   HEMOGLOBIN g/dL 9.3* 9.2* 9.0* 10.0*

## 2023-08-06 NOTE — PROGRESS NOTES
Progress Note - Pulmonary   Randal Lies 78 y.o. male MRN: [id number]  Unit/Bed#: 2 [place] [address] A Encounter: [id number]    Assessment:  Right exudative pleural effusion: CKD on diuretics   Parapneumonic effusion  Status post chest tube placed: still has avg 200 cc per day drainage    Plan:  Cont chest tube drainage : Discussed option of pleurx with son Shavon Hinojosa at bedside on Friday . He is also exploring possible option of hospice at long term NH; plan for pleurx on 8/7/23: D/w Dr. Jacklyn Malloy, IR physician; all necessary orders placed by IR team   IS  Cont diuresis as per renal   Aspiration precaution        Subjective:   Pt seen and examined at bedside. Pt is afebrile. He denies any new complaints. Objective:     Vitals: Blood pressure 125/69, pulse 90, temperature 97.6 °F (36.4 °C), temperature source Axillary, resp. rate 18, height 5' 9" (1.753 m), weight 68.1 kg (150 lb 1.6 oz), SpO2 100 %. ,Body mass index is 22.17 kg/m². Intake/Output Summary (Last 24 hours) at 8/6/2023 1120  Last data filed at 8/6/2023 0839  Gross per 24 hour   Intake 200 ml   Output 895 ml   Net -695 ml       Invasive Devices     Peripheral Intravenous Line  Duration           Peripheral IV 08/06/23 Dorsal (posterior); Left Forearm <1 day          Drain  Duration           External Urinary Catheter Small 27 days    Chest Tube 1 Right Pleural 10 Fr. 8 days    External Urinary Catheter 7 days                Physical Exam:     General: Awake follows simple command, appears debilitated   HEENT: atrumatic head  Lungs: decreased breath sound at the right lung base, no rales or wheezing; chest tube   CVS: S1S2+, no m/r/g  Abdomen: soft, Nd, NT  Ext; no edema  Neuro: awake follows simple command       Labs: I have personally reviewed pertinent lab results. Imaging and other studies: I have personally reviewed pertinent reports.    and I have personally reviewed pertinent films in PACS

## 2023-08-06 NOTE — ASSESSMENT & PLAN NOTE
Right exudative pleural effusion with history of CKD/CHF on diuretics  · Pulmonary following, appreciate input  · Status post chest tube placement, still draining  · Continue home dose diuretics  · Encourage incentive spirometry  · Aspiration precautions  · Plan for chest tube removal and pleurx catheter placement Monday 8/7 by IR  · Family plans to transition to hospice at rehab

## 2023-08-06 NOTE — ASSESSMENT & PLAN NOTE
· Sees nephrology as an outpatient baseline creatinine closer to 2.3  · Takes torsemide 40 mg daily at home, monitor on home dose  · Cr currently at baseline    Results from last 7 days   Lab Units 08/06/23  0505 08/05/23  0502 08/04/23  0510 08/03/23  0438 08/02/23  0621 08/01/23  0501 07/31/23  0453   BUN mg/dL 34* 34* 35* 33* 30* 32* 32*   CREATININE mg/dL 2.32* 2.37* 2.26* 2.20* 2.14* 2.28* 2.29*   EGFR ml/min/1.73sq m 24 24 25 26 27 25 25

## 2023-08-07 ENCOUNTER — APPOINTMENT (OUTPATIENT)
Dept: NON INVASIVE DIAGNOSTICS | Facility: HOSPITAL | Age: 83
DRG: 871 | End: 2023-08-07
Attending: RADIOLOGY
Payer: MEDICARE

## 2023-08-07 ENCOUNTER — APPOINTMENT (INPATIENT)
Dept: RADIOLOGY | Facility: HOSPITAL | Age: 83
DRG: 871 | End: 2023-08-07
Payer: MEDICARE

## 2023-08-07 LAB
ANION GAP SERPL CALCULATED.3IONS-SCNC: 4 MMOL/L
BUN SERPL-MCNC: 35 MG/DL (ref 5–25)
CALCIUM SERPL-MCNC: 7.7 MG/DL (ref 8.4–10.2)
CHLORIDE SERPL-SCNC: 97 MMOL/L (ref 96–108)
CO2 SERPL-SCNC: 30 MMOL/L (ref 21–32)
CREAT SERPL-MCNC: 2.19 MG/DL (ref 0.6–1.3)
ERYTHROCYTE [DISTWIDTH] IN BLOOD BY AUTOMATED COUNT: 16.2 % (ref 11.6–15.1)
GFR SERPL CREATININE-BSD FRML MDRD: 26 ML/MIN/1.73SQ M
GLUCOSE SERPL-MCNC: 206 MG/DL (ref 65–140)
GLUCOSE SERPL-MCNC: 238 MG/DL (ref 65–140)
GLUCOSE SERPL-MCNC: 240 MG/DL (ref 65–140)
GLUCOSE SERPL-MCNC: 281 MG/DL (ref 65–140)
GLUCOSE SERPL-MCNC: 330 MG/DL (ref 65–140)
HCT VFR BLD AUTO: 28.1 % (ref 36.5–49.3)
HGB BLD-MCNC: 9.5 G/DL (ref 12–17)
MCH RBC QN AUTO: 34.1 PG (ref 26.8–34.3)
MCHC RBC AUTO-ENTMCNC: 33.8 G/DL (ref 31.4–37.4)
MCV RBC AUTO: 101 FL (ref 82–98)
PLATELET # BLD AUTO: 201 THOUSANDS/UL (ref 149–390)
PMV BLD AUTO: 11.9 FL (ref 8.9–12.7)
POTASSIUM SERPL-SCNC: 3.8 MMOL/L (ref 3.5–5.3)
RBC # BLD AUTO: 2.79 MILLION/UL (ref 3.88–5.62)
SARS-COV-2 RNA RESP QL NAA+PROBE: NEGATIVE
SODIUM SERPL-SCNC: 131 MMOL/L (ref 135–147)
WBC # BLD AUTO: 4.71 THOUSAND/UL (ref 4.31–10.16)

## 2023-08-07 PROCEDURE — 87635 SARS-COV-2 COVID-19 AMP PRB: CPT | Performed by: NURSE PRACTITIONER

## 2023-08-07 PROCEDURE — 82948 REAGENT STRIP/BLOOD GLUCOSE: CPT

## 2023-08-07 PROCEDURE — 94640 AIRWAY INHALATION TREATMENT: CPT

## 2023-08-07 PROCEDURE — 99232 SBSQ HOSP IP/OBS MODERATE 35: CPT | Performed by: NURSE PRACTITIONER

## 2023-08-07 PROCEDURE — 99152 MOD SED SAME PHYS/QHP 5/>YRS: CPT

## 2023-08-07 PROCEDURE — 85027 COMPLETE CBC AUTOMATED: CPT

## 2023-08-07 PROCEDURE — 80048 BASIC METABOLIC PNL TOTAL CA: CPT

## 2023-08-07 PROCEDURE — C1729 CATH, DRAINAGE: HCPCS

## 2023-08-07 PROCEDURE — A7048 VACUUM DRAIN BOTTLE/TUBE KIT: HCPCS

## 2023-08-07 PROCEDURE — 94760 N-INVAS EAR/PLS OXIMETRY 1: CPT

## 2023-08-07 PROCEDURE — 94664 DEMO&/EVAL PT USE INHALER: CPT

## 2023-08-07 PROCEDURE — 99153 MOD SED SAME PHYS/QHP EA: CPT

## 2023-08-07 PROCEDURE — 71250 CT THORAX DX C-: CPT

## 2023-08-07 PROCEDURE — 75989 ABSCESS DRAINAGE UNDER X-RAY: CPT

## 2023-08-07 PROCEDURE — G1004 CDSM NDSC: HCPCS

## 2023-08-07 PROCEDURE — 32557 INSERT CATH PLEURA W/ IMAGE: CPT | Performed by: INTERNAL MEDICINE

## 2023-08-07 PROCEDURE — NC001 PR NO CHARGE: Performed by: INTERNAL MEDICINE

## 2023-08-07 PROCEDURE — 32550 INSERT PLEURAL CATH: CPT

## 2023-08-07 RX ORDER — LIDOCAINE WITH 8.4% SOD BICARB 0.9%(10ML)
SYRINGE (ML) INJECTION AS NEEDED
Status: COMPLETED | OUTPATIENT
Start: 2023-08-07 | End: 2023-08-07

## 2023-08-07 RX ADMIN — TORSEMIDE 40 MG: 20 TABLET ORAL at 09:40

## 2023-08-07 RX ADMIN — LEVALBUTEROL HYDROCHLORIDE 1.25 MG: 1.25 SOLUTION RESPIRATORY (INHALATION) at 02:05

## 2023-08-07 RX ADMIN — TAMSULOSIN HYDROCHLORIDE 0.4 MG: 0.4 CAPSULE ORAL at 17:00

## 2023-08-07 RX ADMIN — ACETAMINOPHEN 975 MG: 650 SUSPENSION ORAL at 09:40

## 2023-08-07 RX ADMIN — ACETAMINOPHEN 975 MG: 650 SUSPENSION ORAL at 16:59

## 2023-08-07 RX ADMIN — TIMOLOL MALEATE 1 DROP: 5 SOLUTION/ DROPS OPHTHALMIC at 09:42

## 2023-08-07 RX ADMIN — ALLOPURINOL 100 MG: 100 TABLET ORAL at 17:00

## 2023-08-07 RX ADMIN — Medication 10 ML: at 14:19

## 2023-08-07 RX ADMIN — LEVALBUTEROL HYDROCHLORIDE 1.25 MG: 1.25 SOLUTION RESPIRATORY (INHALATION) at 07:37

## 2023-08-07 RX ADMIN — LEVALBUTEROL HYDROCHLORIDE 1.25 MG: 1.25 SOLUTION RESPIRATORY (INHALATION) at 19:28

## 2023-08-07 RX ADMIN — ATORVASTATIN CALCIUM 40 MG: 40 TABLET, FILM COATED ORAL at 17:00

## 2023-08-07 RX ADMIN — INSULIN LISPRO 2 UNITS: 100 INJECTION, SOLUTION INTRAVENOUS; SUBCUTANEOUS at 17:28

## 2023-08-07 RX ADMIN — ALLOPURINOL 100 MG: 100 TABLET ORAL at 09:40

## 2023-08-07 RX ADMIN — INSULIN LISPRO 4 UNITS: 100 INJECTION, SOLUTION INTRAVENOUS; SUBCUTANEOUS at 23:13

## 2023-08-07 RX ADMIN — APIXABAN 2.5 MG: 2.5 TABLET, FILM COATED ORAL at 17:00

## 2023-08-07 RX ADMIN — INSULIN LISPRO 3 UNITS: 100 INJECTION, SOLUTION INTRAVENOUS; SUBCUTANEOUS at 11:57

## 2023-08-07 RX ADMIN — APIXABAN 2.5 MG: 2.5 TABLET, FILM COATED ORAL at 09:40

## 2023-08-07 NOTE — ASSESSMENT & PLAN NOTE
· Empyema of the lung requiring chest tube placement on 6/24 and removal on 7/7. Received tPA/dornase administration and 7-day course of cefepime during previous admission. · Chest tube placed again on 7/28 by IR due to persistent right hydropneumothorax. Pleural effusion PH 7.8 with no evidence of recurrent empyema.     · Cultures showing no growth   · IR and pulmonary following for chest tube management  · Plan is for Asept catheter placement today

## 2023-08-07 NOTE — PROGRESS NOTES
Interventional Radiology:    CT obtained after tunneled right pleural drain placement to confirm position. Introduction of a large PTX within the empyema space, not unexpected as there was difficulty advancing the tube into position. The newly placed tube is correctly within the empyema space, however confined by several loculations. Recommend keeping both the existing posterior chest tube, and newly placed tunneled pleural drain in place, both to low suction overnight. Repeat chest xray in the am.     If PTX has resolved the posterior chest tube can then be removed. Addendum: patient seen at bedside, doing well with both tubes to suction.

## 2023-08-07 NOTE — PLAN OF CARE
Problem: INFECTION - ADULT  Goal: Absence or prevention of progression during hospitalization  Description: INTERVENTIONS:  - Assess and monitor for signs and symptoms of infection  - Monitor lab/diagnostic results  - Monitor all insertion sites, i.e. indwelling lines, tubes, and drains  - Monitor endotracheal if appropriate and nasal secretions for changes in amount and color  - [place] appropriate cooling/warming therapies per order  - Administer medications as ordered  - Instruct and encourage patient and family to use good hand hygiene technique  - Identify and instruct in appropriate isolation precautions for identified infection/condition  Outcome: Progressing  Goal: Absence of fever/infection during neutropenic period  Description: INTERVENTIONS:  - Monitor WBC    Outcome: Progressing     Problem: METABOLIC, FLUID AND ELECTROLYTES - ADULT  Goal: Electrolytes maintained within normal limits  Description: INTERVENTIONS:  - Monitor labs and assess patient for signs and symptoms of electrolyte imbalances  - Administer electrolyte replacement as ordered  - Monitor response to electrolyte replacements, including repeat lab results as appropriate  - Instruct patient on fluid and nutrition as appropriate  Outcome: Progressing  Goal: Fluid balance maintained  Description: INTERVENTIONS:  - Monitor labs   - Monitor I/O and WT  - Instruct patient on fluid and nutrition as appropriate  - Assess for signs & symptoms of volume excess or deficit  Outcome: Progressing     Problem: RESPIRATORY - ADULT  Goal: Achieves optimal ventilation and oxygenation  Description: INTERVENTIONS:  - Assess for changes in respiratory status  - Assess for changes in mentation and behavior  - Position to facilitate oxygenation and minimize respiratory effort  - Oxygen administered by appropriate delivery if ordered  - Initiate smoking cessation education as indicated  - Encourage broncho-pulmonary hygiene including cough, deep breathe, Incentive Spirometry  - Assess the need for suctioning and aspirate as needed  - Assess and instruct to report SOB or any respiratory difficulty  - Respiratory Therapy support as indicated  Outcome: Progressing     Problem: Nutrition/Hydration-ADULT  Goal: Nutrient/Hydration intake appropriate for improving, restoring or maintaining nutritional needs  Description: Monitor and assess patient's nutrition/hydration status for malnutrition. Collaborate with interdisciplinary team and initiate plan and interventions as ordered. Monitor patient's weight and dietary intake as ordered or per policy. Utilize nutrition screening tool and intervene as necessary. Determine patient's food preferences and provide high-protein, high-caloric foods as appropriate.      INTERVENTIONS:  - Monitor oral intake, urinary output, labs, and treatment plans  - Assess nutrition and hydration status and recommend course of action  - Evaluate amount of meals eaten  - Assist patient with eating if necessary   - Allow adequate time for meals  - Recommend/ encourage appropriate diets, oral nutritional supplements, and vitamin/mineral supplements  - Order, calculate, and assess calorie counts as needed  - Recommend, monitor, and adjust tube feedings and TPN/PPN based on assessed needs  - Assess need for intravenous fluids  - Provide specific nutrition/hydration education as appropriate  - Include patient/family/caregiver in decisions related to nutrition  Outcome: Progressing     Problem: Prexisting or High Potential for Compromised Skin Integrity  Goal: Skin integrity is maintained or improved  Description: INTERVENTIONS:  - Identify patients at risk for skin breakdown  - Assess and monitor skin integrity  - Assess and monitor nutrition and hydration status  - Monitor labs   - Assess for incontinence   - Turn and reposition patient  - Assist with mobility/ambulation  - Relieve pressure over bony prominences  - Avoid friction and shearing  - Provide appropriate hygiene as needed including keeping skin clean and dry  - Evaluate need for skin moisturizer/barrier cream  - Collaborate with interdisciplinary team   - Patient/family teaching  - Consider wound care consult   Outcome: Progressing

## 2023-08-07 NOTE — ASSESSMENT & PLAN NOTE
· Anemia without evidence of blood loss. Likely chronic disease.   · Improving with diuresis    Results from last 7 days   Lab Units 08/07/23  0450 08/06/23  0505 08/05/23  0502 08/04/23  0510   HEMOGLOBIN g/dL 9.5* 9.3* 9.2* 9.0*

## 2023-08-07 NOTE — PROGRESS NOTES
[address] [facility]  Progress Note  Name: Claude Slovak  MRN: [id number]  Unit/Bed#: 2 [facility] 204 A I Date of Admission: 7/27/2023   Date of Service: 8/7/2023 I Hospital Day: 11    Assessment/Plan   Pleural effusion exudative  Assessment & Plan  Right exudative pleural effusion with history of CKD/CHF on diuretics  · Pulmonary following, appreciate input  · Status post chest tube placement, still draining  · Continue home dose diuretics  · Encourage incentive spirometry  · Aspiration precautions  · Plan for chest tube removal and pleurx catheter placement Monday 8/7 by IR  · Family plans to transition to hospice at rehab    Recent empyema of lung with persistent locuted R hydropneumothorax  Assessment & Plan  · Empyema of the lung requiring chest tube placement on 6/24 and removal on 7/7. Received tPA/dornase administration and 7-day course of cefepime during previous admission. · Chest tube placed again on 7/28 by IR due to persistent right hydropneumothorax. Pleural effusion PH 7.8 with no evidence of recurrent empyema. · Cultures showing no growth   · IR and pulmonary following for chest tube management  · Plan is for Asept catheter placement today     Anemia  Assessment & Plan  · Anemia without evidence of blood loss. Likely chronic disease.   · Improving with diuresis    Results from last 7 days   Lab Units 08/07/23  0450 08/06/23  0505 08/05/23  0502 08/04/23  0510   HEMOGLOBIN g/dL 9.5* 9.3* 9.2* 9.0*       Acute kidney injury superimposed on chronic kidney disease ([address])  Assessment & Plan  · Sees nephrology as an outpatient baseline creatinine closer to 2.3  · Takes torsemide 40 mg daily at home, monitor on home dose  · Cr currently at baseline    Results from last 7 days   Lab Units 08/07/23  0604 08/06/23  0505 08/05/23  0502 08/04/23  0510 08/03/23  0438 08/02/23  0621 08/01/23  0501   BUN mg/dL 35* 34* 34* 35* 33* 30* 32*   CREATININE mg/dL 2.19* 2.32* 2.37* 2.26* 2.20* 2.14* 2.28* EGFR ml/min/1.73sq m 26 24 24 25 26 27 25       Atrial fibrillation with slow ventricular response (HCC)  Assessment & Plan  · Intrinsically rate controlled. Continue eliquis    Chronic combined systolic and diastolic congestive heart failure (HCC)  Assessment & Plan  · Combined CHF   · Serous output in chest tube + moist cough  · Given Lasix 40 mg IV x1 8/3  · Continue home Torsemide 40 mg daily  · Monitor I+O, daily weights    Wt Readings from Last 3 Encounters:   07/27/23 68.1 kg (150 lb 1.6 oz)   07/10/23 64.9 kg (143 lb 1.3 oz)   06/28/23 74.5 kg (164 lb 3.9 oz)       Recent pericardial effusion  Assessment & Plan  · Recent pericardiocentesis 6/30 for tamponade. Recovered    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 ([address])  Assessment & Plan  · Diabetes mellitus prior to admission on januvia and glimepiride  · Monitor Accu-Cheks AC plus at bedtime with lispro insulin sliding scale coverage  · Diabetic diet    Results from last 7 days   Lab Units 08/07/23  0604 08/06/23  0505 08/05/23  0502 08/04/23  0510   GLUCOSE RANDOM mg/dL 206* 182* 282* 373*       * Aspiration pneumonia (HCC)  Assessment & Plan  History of atrial fibrillation congestive heart failure CKD 4 diabetes and recent pericardial effusion presents to the hospital with shortness of breath found to have recurrent right exudative pleural effusion/parapneumonic effusion  · Sepsis on admission secondary to aspiration pneumonia and effusion  · Completed 7 days cefepime and metronidazole  · Chest tube in place   · Dysphagia: Status post video barium swallow  · Speech recommendations: mechanical soft diet with thin liquids             VTE Pharmacologic Prophylaxis:   Pharmacologic: Apixaban (Eliquis)  Mechanical VTE Prophylaxis in Place: Yes    Patient Centered Rounds: I have performed bedside rounds with nursing staff today.   Discussions with Specialists or Other Care Team Provider: Yes  Education and Discussions with Family / Patient:Yes son Abel Couch called, no answer   Time Spent for Care: 15 minutes. More than 50% of total time spent on counseling and coordination of care as described above. Current Length of Stay: 11 day(s)  Current Patient Status: Inpatient     Discharge Plan: 24-48 hours     Code Status: Level 3 - DNAR and DNI      Subjective:   Patient sitting up in bed - alert and oriented at present. He states he feels okay today. He has no pain or discomfort. He is anxious to get the procedure done today. Objective:     Vitals:   Temp (24hrs), Av.3 °F (36.3 °C), Min:97.1 °F (36.2 °C), Max:97.8 °F (36.6 °C)    Temp:  [97.1 °F (36.2 °C)-97.8 °F (36.6 °C)] 97.2 °F (36.2 °C)  HR:  [80-96] 96  Resp:  [15-20] 15  BP: (115-136)/(52-70) 136/67  SpO2:  [96 %-100 %] 98 %  Body mass index is 22.17 kg/m². Input and Output Summary (last 24 hours): Intake/Output Summary (Last 24 hours) at 2023 1021  Last data filed at 2023 0552  Gross per 24 hour   Intake 710 ml   Output 480 ml   Net 230 ml        Physical Exam:     Physical Exam  Vitals and nursing note reviewed. Constitutional:       Appearance: He is ill-appearing. HENT:      Head: Normocephalic. Nose: Nose normal.   Eyes:      Extraocular Movements: Extraocular movements intact. Cardiovascular:      Rate and Rhythm: Normal rate and regular rhythm. Pulmonary:      Comments: Diminished breath sounds  Abdominal:      General: Abdomen is flat. Bowel sounds are normal.      Palpations: Abdomen is soft. Musculoskeletal:         General: Swelling present. Comments: Trace edema noted   Skin:     General: Skin is warm and dry. Neurological:      General: No focal deficit present. Mental Status: He is alert. Mental status is at baseline.          Additional Data:     Labs:    Results from last 7 days   Lab Units 23  0450 23  0505 23  0502 23  0510 23  0438   WBC Thousand/uL 4.71 4.35 4.67 5.26 4.17* HEMOGLOBIN g/dL 9.5* 9.3* 9.2* 9.0* 10.0*   HEMATOCRIT % 28.1* 28.9* 27.5* 28.3* 31.2*   PLATELETS Thousands/uL 201 126* 120* 121* 120*   NEUTROS PCT %  --   --  56 59 59     Results from last 7 days   Lab Units 08/07/23  0604 08/06/23  0505 08/05/23  0502 08/04/23  0510 08/03/23  0438   SODIUM mmol/L 131* 132* 130* 131* 134*   POTASSIUM mmol/L 3.8 3.8 4.1 4.0 4.1   CHLORIDE mmol/L 97 97 96 97 100   CO2 mmol/L 30 30 28 26 29   BUN mg/dL 35* 34* 34* 35* 33*   CREATININE mg/dL 2.19* 2.32* 2.37* 2.26* 2.20*   CALCIUM mg/dL 7.7* 7.9* 8.2* 7.8* 8.0*   TOTAL BILIRUBIN mg/dL  --   --  0.54 0.42 0.49   ALK PHOS U/L  --   --  126* 138* 113*   ALT U/L  --   --  10 10 11   AST U/L  --   --  11* 10* 13             Lab Results   Component Value Date/Time    HGBA1C 8.3 (H) 02/23/2023 05:02 AM     Results from last 7 days   Lab Units 08/07/23  0721 08/06/23 2033 08/06/23  1613 08/06/23  1113 08/06/23  0708 08/05/23  2034 08/05/23  1553 08/05/23  1104 08/05/23  0808   POC GLUCOSE mg/dl 238* 341* 333* 308* 196* 243* 152* 315* 303*     Results from last 7 days   Lab Units 08/03/23  0438 08/01/23  0501   PROCALCITONIN ng/ml 0.10 0.12       * I Have Reviewed All Lab Data Listed Above. * Additional Pertinent Lab Tests Reviewed: [facility] Admission Reviewed    Imaging:     XR chest portable   Final Result by Amanda Sheth MD (08/01 1447)      Right-sided pleural drainage catheter remains in place   Right lower lung remains unchanged   No worsening   No pneumothorax            Workstation performed: [id number]         FL barium swallow video w speech   Final Result by MINA DON (07/31 1056)      XR chest portable   Final Result by Vicky Morataya MD (07/31 1601)      Right-sided chest tube with a small right basilar pneumothorax with surrounding airspace opacification. The study was marked in [facility] for immediate notification.             Workstation performed: [id number] IR chest tube placement   Final Result by Estrada Ibarra MD (07/28 1741)   Impression:   Successful placement of 10 [place] all-purpose drainage catheter into the right pleural space under ultrasound guidance, yielding 150 cc of prashanth pleural fluid. Workstation performed: [id number]         IR pleural effusion long-term catheter placement    (Results Pending)     Imaging Reports Reviewed by myself    Cultures:   Blood Culture:   Lab Results   Component Value Date    BLOODCX No Growth After 5 Days. 07/27/2023    BLOODCX No Growth After 5 Days. 07/27/2023    BLOODCX No Growth After 5 Days. 06/23/2023    BLOODCX Micrococcus luteus (A) 06/23/2023    BLOODCX No Growth After 5 Days. 01/05/2021    BLOODCX No Growth After 5 Days.  01/05/2021     Urine Culture:   Lab Results   Component Value Date    URINECX 10,000-19,000 cfu/ml 07/27/2023     Sputum Culture: No components found for: "Nadia Plough"  Wound Culture: No results found for: "WOUNDCULT"    Last 24 Hours Medication List:   Current Facility-Administered Medications   Medication Dose Route Frequency Provider Last Rate   • acetaminophen  975 mg Oral Q8H Minerva Edouard MD     • allopurinol  100 mg Oral BID Domingo Clubs, DO     • apixaban  2.5 mg Oral BID Domingo Clubs, DO     • atorvastatin  40 mg Oral Daily With Dinner Kenney Morales, DO     • docusate sodium  100 mg Oral BID PRN Minerva Edouard MD     • HYDROmorphone  0.2 mg Intravenous Q4H PRN Minerva Edouard MD     • insulin lispro  1-5 Units Subcutaneous TID AC Hector Hodgson PA-C     • insulin lispro  1-5 Units Subcutaneous HS Hector Hodgson PA-C     • levalbuterol  1.25 mg Nebulization Q6H SUSANA Rodriguez     • ondansetron  4 mg Intravenous Q6H PRN Domigno Clubs, DO     • oxyCODONE  2.5 mg Oral Q4H PRN Minerva Edouard MD     • tamsulosin  0.4 mg Oral Daily With Dinner Domingo Clubs, DO     • timolol  1 drop Both Eyes Daily Kenney Morales, DO     • torsemide  40 mg Oral Daily SUSANA Tellez          Today, Patient Was Seen By: SUSANA Zavala    ** Please Note: Dragon 360 Dictation voice to text software may have been used in the creation of this document.  **

## 2023-08-07 NOTE — ASSESSMENT & PLAN NOTE
· Diabetes mellitus prior to admission on januvia and glimepiride  · Monitor Accu-Cheks AC plus at bedtime with lispro insulin sliding scale coverage  · Diabetic diet    Results from last 7 days   Lab Units 08/07/23  0604 08/06/23  0505 08/05/23  0502 08/04/23  0510   GLUCOSE RANDOM mg/dL 206* 182* 282* 373*

## 2023-08-07 NOTE — ASSESSMENT & PLAN NOTE
History of atrial fibrillation congestive heart failure CKD 4 diabetes and recent pericardial effusion presents to the hospital with shortness of breath found to have recurrent right exudative pleural effusion/parapneumonic effusion  · Sepsis on admission secondary to aspiration pneumonia and effusion  · Completed 7 days cefepime and metronidazole  · Chest tube in place   · Dysphagia: Status post video barium swallow  · Speech recommendations: mechanical soft diet with thin liquids

## 2023-08-07 NOTE — ASSESSMENT & PLAN NOTE
· Sees nephrology as an outpatient baseline creatinine closer to 2.3  · Takes torsemide 40 mg daily at home, monitor on home dose  · Cr currently at baseline    Results from last 7 days   Lab Units 08/07/23  0604 08/06/23  0505 08/05/23  0502 08/04/23  0510 08/03/23  0438 08/02/23  0621 08/01/23  0501   BUN mg/dL 35* 34* 34* 35* 33* 30* 32*   CREATININE mg/dL 2.19* 2.32* 2.37* 2.26* 2.20* 2.14* 2.28*   EGFR ml/min/1.73sq m 26 24 24 25 26 27 25

## 2023-08-07 NOTE — BRIEF OP NOTE (RAD/CATH)
INTERVENTIONAL RADIOLOGY PROCEDURE NOTE    Date: 8/7/2023    Procedure:   Procedure Summary     Date:  Room / Location:     Anesthesia Start:  Anesthesia Stop:     Procedure:  Diagnosis:     Scheduled Providers:  Responsible Provider:     Anesthesia Type: Not recorded ASA Status: Not recorded          Preoperative diagnosis:   1. KARINA (acute kidney injury) ([address])    2. Aspiration pneumonitis ([address])    3. Sepsis ([address])    4. Pleural effusion, right side    5. Empyema of lung ([address])    6. Pleural effusion exudative    7. Goals of care, counseling/discussion         Postoperative diagnosis: Same. Surgeon: Presley Schaumann, MD     Assistant: None. No qualified resident was available. Blood loss: Minimal    Specimens: None     Findings:     Right tunneled pleural drainage catheter placement. Existing chest tube left in place for time being, this access was not utilized for the tunneled drain as it was felt to be too posterior to allow the tunneled portion of the catheter to be easily accessible. Also there was a moderate residual right pleural effusion on initial US imaging, perhaps this prior chest tube was in a loculated portion of the collection. Patient experienced some discomfort after the new catheter was placed to suction, will obtain a chest CT to evaluate position. Complications: None immediate.     Anesthesia: conscious sedation

## 2023-08-07 NOTE — PHYSICAL THERAPY NOTE
PT Cancellation Note     08/07/23 1249   PT Last Visit   PT Visit Date 08/07/23   Note Type   Note Type Cancelled Session   Cancel Reasons Patient off floor/test  (Attempted to see pt for PT session this PM - pt leaving floor for procedure.  Will follow up as schedule allows.)   Licensure   [place] License Number  Charlann Karson, SPT

## 2023-08-08 ENCOUNTER — APPOINTMENT (INPATIENT)
Dept: RADIOLOGY | Facility: HOSPITAL | Age: 83
DRG: 871 | End: 2023-08-08
Payer: MEDICARE

## 2023-08-08 LAB
ANION GAP SERPL CALCULATED.3IONS-SCNC: 5 MMOL/L
BASOPHILS # BLD AUTO: 0.02 THOUSANDS/ÂΜL (ref 0–0.1)
BASOPHILS NFR BLD AUTO: 0 % (ref 0–1)
BUN SERPL-MCNC: 37 MG/DL (ref 5–25)
CALCIUM SERPL-MCNC: 7.9 MG/DL (ref 8.4–10.2)
CHLORIDE SERPL-SCNC: 96 MMOL/L (ref 96–108)
CO2 SERPL-SCNC: 30 MMOL/L (ref 21–32)
CREAT SERPL-MCNC: 2.29 MG/DL (ref 0.6–1.3)
EOSINOPHIL # BLD AUTO: 0.04 THOUSAND/ÂΜL (ref 0–0.61)
EOSINOPHIL NFR BLD AUTO: 1 % (ref 0–6)
ERYTHROCYTE [DISTWIDTH] IN BLOOD BY AUTOMATED COUNT: 16.6 % (ref 11.6–15.1)
GFR SERPL CREATININE-BSD FRML MDRD: 25 ML/MIN/1.73SQ M
GLUCOSE SERPL-MCNC: 267 MG/DL (ref 65–140)
GLUCOSE SERPL-MCNC: 311 MG/DL (ref 65–140)
GLUCOSE SERPL-MCNC: 314 MG/DL (ref 65–140)
GLUCOSE SERPL-MCNC: 325 MG/DL (ref 65–140)
GLUCOSE SERPL-MCNC: 342 MG/DL (ref 65–140)
HCT VFR BLD AUTO: 31.3 % (ref 36.5–49.3)
HGB BLD-MCNC: 10.1 G/DL (ref 12–17)
IMM GRANULOCYTES # BLD AUTO: 0.04 THOUSAND/UL (ref 0–0.2)
IMM GRANULOCYTES NFR BLD AUTO: 1 % (ref 0–2)
LYMPHOCYTES # BLD AUTO: 1.4 THOUSANDS/ÂΜL (ref 0.6–4.47)
LYMPHOCYTES NFR BLD AUTO: 27 % (ref 14–44)
MCH RBC QN AUTO: 33.1 PG (ref 26.8–34.3)
MCHC RBC AUTO-ENTMCNC: 32.3 G/DL (ref 31.4–37.4)
MCV RBC AUTO: 103 FL (ref 82–98)
MONOCYTES # BLD AUTO: 0.79 THOUSAND/ÂΜL (ref 0.17–1.22)
MONOCYTES NFR BLD AUTO: 15 % (ref 4–12)
MYCOBACTERIUM SPEC CULT: NORMAL
NEUTROPHILS # BLD AUTO: 3 THOUSANDS/ÂΜL (ref 1.85–7.62)
NEUTS SEG NFR BLD AUTO: 56 % (ref 43–75)
NRBC BLD AUTO-RTO: 0 /100 WBCS
PLATELET # BLD AUTO: 146 THOUSANDS/UL (ref 149–390)
PMV BLD AUTO: 10.6 FL (ref 8.9–12.7)
POTASSIUM SERPL-SCNC: 4 MMOL/L (ref 3.5–5.3)
RBC # BLD AUTO: 3.05 MILLION/UL (ref 3.88–5.62)
RHODAMINE-AURAMINE STN SPEC: NORMAL
SODIUM SERPL-SCNC: 131 MMOL/L (ref 135–147)
WBC # BLD AUTO: 5.29 THOUSAND/UL (ref 4.31–10.16)

## 2023-08-08 PROCEDURE — 99232 SBSQ HOSP IP/OBS MODERATE 35: CPT | Performed by: HOSPITALIST

## 2023-08-08 PROCEDURE — 94760 N-INVAS EAR/PLS OXIMETRY 1: CPT

## 2023-08-08 PROCEDURE — 92526 ORAL FUNCTION THERAPY: CPT

## 2023-08-08 PROCEDURE — 99232 SBSQ HOSP IP/OBS MODERATE 35: CPT | Performed by: NURSE PRACTITIONER

## 2023-08-08 PROCEDURE — 94640 AIRWAY INHALATION TREATMENT: CPT

## 2023-08-08 PROCEDURE — 80048 BASIC METABOLIC PNL TOTAL CA: CPT | Performed by: NURSE PRACTITIONER

## 2023-08-08 PROCEDURE — 71045 X-RAY EXAM CHEST 1 VIEW: CPT

## 2023-08-08 PROCEDURE — 85025 COMPLETE CBC W/AUTO DIFF WBC: CPT | Performed by: NURSE PRACTITIONER

## 2023-08-08 PROCEDURE — 82948 REAGENT STRIP/BLOOD GLUCOSE: CPT

## 2023-08-08 RX ORDER — INSULIN LISPRO 100 [IU]/ML
1-6 INJECTION, SOLUTION INTRAVENOUS; SUBCUTANEOUS
Status: DISCONTINUED | OUTPATIENT
Start: 2023-08-08 | End: 2023-08-10

## 2023-08-08 RX ADMIN — ACETAMINOPHEN 975 MG: 650 SUSPENSION ORAL at 09:06

## 2023-08-08 RX ADMIN — LEVALBUTEROL HYDROCHLORIDE 1.25 MG: 1.25 SOLUTION RESPIRATORY (INHALATION) at 08:18

## 2023-08-08 RX ADMIN — TAMSULOSIN HYDROCHLORIDE 0.4 MG: 0.4 CAPSULE ORAL at 16:23

## 2023-08-08 RX ADMIN — TORSEMIDE 40 MG: 20 TABLET ORAL at 09:06

## 2023-08-08 RX ADMIN — APIXABAN 2.5 MG: 2.5 TABLET, FILM COATED ORAL at 18:07

## 2023-08-08 RX ADMIN — ATORVASTATIN CALCIUM 40 MG: 40 TABLET, FILM COATED ORAL at 16:23

## 2023-08-08 RX ADMIN — APIXABAN 2.5 MG: 2.5 TABLET, FILM COATED ORAL at 09:07

## 2023-08-08 RX ADMIN — LEVALBUTEROL HYDROCHLORIDE 1.25 MG: 1.25 SOLUTION RESPIRATORY (INHALATION) at 02:26

## 2023-08-08 RX ADMIN — LEVALBUTEROL HYDROCHLORIDE 1.25 MG: 1.25 SOLUTION RESPIRATORY (INHALATION) at 19:19

## 2023-08-08 RX ADMIN — ALLOPURINOL 100 MG: 100 TABLET ORAL at 09:07

## 2023-08-08 RX ADMIN — INSULIN LISPRO 5 UNITS: 100 INJECTION, SOLUTION INTRAVENOUS; SUBCUTANEOUS at 23:18

## 2023-08-08 RX ADMIN — INSULIN LISPRO 5 UNITS: 100 INJECTION, SOLUTION INTRAVENOUS; SUBCUTANEOUS at 16:28

## 2023-08-08 RX ADMIN — ACETAMINOPHEN 975 MG: 650 SUSPENSION ORAL at 16:23

## 2023-08-08 RX ADMIN — INSULIN LISPRO 3 UNITS: 100 INJECTION, SOLUTION INTRAVENOUS; SUBCUTANEOUS at 11:30

## 2023-08-08 RX ADMIN — TIMOLOL MALEATE 1 DROP: 5 SOLUTION/ DROPS OPHTHALMIC at 09:10

## 2023-08-08 RX ADMIN — ALLOPURINOL 100 MG: 100 TABLET ORAL at 18:06

## 2023-08-08 RX ADMIN — LEVALBUTEROL HYDROCHLORIDE 1.25 MG: 1.25 SOLUTION RESPIRATORY (INHALATION) at 13:28

## 2023-08-08 RX ADMIN — INSULIN LISPRO 3 UNITS: 100 INJECTION, SOLUTION INTRAVENOUS; SUBCUTANEOUS at 07:51

## 2023-08-08 RX ADMIN — ACETAMINOPHEN 975 MG: 650 SUSPENSION ORAL at 00:26

## 2023-08-08 NOTE — ASSESSMENT & PLAN NOTE
History of atrial fibrillation congestive heart failure CKD 4 diabetes and recent pericardial effusion presents to the hospital with shortness of breath found to have recurrent right exudative pleural effusion/parapneumonic effusion  · Sepsis on admission secondary to aspiration pneumonia and effusion  · Completed 7 days cefepime and metronidazole  · Dysphagia: Status post video barium swallow  · Speech recommendations: soft diet with thin liquids

## 2023-08-08 NOTE — QUICK NOTE
IR Note    Repeat CXR today showed mild residual right basilar loculated effusion with mild ex vacuo ptx. Plan to keep right chest tube in to suction with repeat CXR tomorrow AM.  Discussed with Dr. Maria Guadalupe Flores.

## 2023-08-08 NOTE — PLAN OF CARE
Assessment:  Pt presents with rapid self administration of food/liquid c cough noted x1 with successive sips of thin liquid. Improved mgmt of foods noted. Plan/Recommendations:  Consider upgrade to Level 3 dysphagia advanced diet, continue thin liquid. Continue to cue for slowed rate of intake as indicated.

## 2023-08-08 NOTE — CASE MANAGEMENT
Case Management Progress Note    Patient name Randal Harley  Location [address] Monique ALVAREZ MRN [id number]  : 1940 Date 2023       LOS (days): 12  Geometric Mean LOS (GMLOS) (days): 5.00  Days to GMLOS:-6.7        OBJECTIVE:        Current admission status: Inpatient  Preferred Pharmacy:   [facility] [address] [place] [address]  Phone: [phone] Fax: [phone]    Primary Care Provider: Dwain Purcell DO    Primary Insurance: MEDICARE  Secondary Insurance: BLUE CROSS    PROGRESS NOTE:      CM called and spoke with Zhao Aden to discuss patient's discharge tomorrow. Son Shavon Hinojosa confirmed the plan is to return to [facility] for STR. CM let Son know that once a transportation time is confirmed we will let him know.

## 2023-08-08 NOTE — PLAN OF CARE
Problem: INFECTION - ADULT  Goal: Absence or prevention of progression during hospitalization  Description: INTERVENTIONS:  - Assess and monitor for signs and symptoms of infection  - Monitor lab/diagnostic results  - Monitor all insertion sites, i.e. indwelling lines, tubes, and drains  - Monitor endotracheal if appropriate and nasal secretions for changes in amount and color  - [place] appropriate cooling/warming therapies per order  - Administer medications as ordered  - Instruct and encourage patient and family to use good hand hygiene technique  - Identify and instruct in appropriate isolation precautions for identified infection/condition  Outcome: Progressing  Goal: Absence of fever/infection during neutropenic period  Description: INTERVENTIONS:  - Monitor WBC    Outcome: Progressing     Problem: METABOLIC, FLUID AND ELECTROLYTES - ADULT  Goal: Electrolytes maintained within normal limits  Description: INTERVENTIONS:  - Monitor labs and assess patient for signs and symptoms of electrolyte imbalances  - Administer electrolyte replacement as ordered  - Monitor response to electrolyte replacements, including repeat lab results as appropriate  - Instruct patient on fluid and nutrition as appropriate  Outcome: Progressing  Goal: Fluid balance maintained  Description: INTERVENTIONS:  - Monitor labs   - Monitor I/O and WT  - Instruct patient on fluid and nutrition as appropriate  - Assess for signs & symptoms of volume excess or deficit  Outcome: Progressing     Problem: RESPIRATORY - ADULT  Goal: Achieves optimal ventilation and oxygenation  Description: INTERVENTIONS:  - Assess for changes in respiratory status  - Assess for changes in mentation and behavior  - Position to facilitate oxygenation and minimize respiratory effort  - Oxygen administered by appropriate delivery if ordered  - Initiate smoking cessation education as indicated  - Encourage broncho-pulmonary hygiene including cough, deep breathe, Incentive Spirometry  - Assess the need for suctioning and aspirate as needed  - Assess and instruct to report SOB or any respiratory difficulty  - Respiratory Therapy support as indicated  Outcome: Progressing     Problem: Nutrition/Hydration-ADULT  Goal: Nutrient/Hydration intake appropriate for improving, restoring or maintaining nutritional needs  Description: Monitor and assess patient's nutrition/hydration status for malnutrition. Collaborate with interdisciplinary team and initiate plan and interventions as ordered. Monitor patient's weight and dietary intake as ordered or per policy. Utilize nutrition screening tool and intervene as necessary. Determine patient's food preferences and provide high-protein, high-caloric foods as appropriate.      INTERVENTIONS:  - Monitor oral intake, urinary output, labs, and treatment plans  - Assess nutrition and hydration status and recommend course of action  - Evaluate amount of meals eaten  - Assist patient with eating if necessary   - Allow adequate time for meals  - Recommend/ encourage appropriate diets, oral nutritional supplements, and vitamin/mineral supplements  - Order, calculate, and assess calorie counts as needed  - Recommend, monitor, and adjust tube feedings and TPN/PPN based on assessed needs  - Assess need for intravenous fluids  - Provide specific nutrition/hydration education as appropriate  - Include patient/family/caregiver in decisions related to nutrition  Outcome: Progressing     Problem: Prexisting or High Potential for Compromised Skin Integrity  Goal: Skin integrity is maintained or improved  Description: INTERVENTIONS:  - Identify patients at risk for skin breakdown  - Assess and monitor skin integrity  - Assess and monitor nutrition and hydration status  - Monitor labs   - Assess for incontinence   - Turn and reposition patient  - Assist with mobility/ambulation  - Relieve pressure over bony prominences  - Avoid friction and shearing  - Provide appropriate hygiene as needed including keeping skin clean and dry  - Evaluate need for skin moisturizer/barrier cream  - Collaborate with interdisciplinary team   - Patient/family teaching  - Consider wound care consult   Outcome: Progressing

## 2023-08-08 NOTE — ASSESSMENT & PLAN NOTE
· Sees nephrology as an outpatient baseline creatinine closer to 2.3  · Takes torsemide 40 mg daily at home, monitor on home dose  · Cr currently at baseline    Results from last 7 days   Lab Units 08/08/23  0443 08/07/23  0604 08/06/23  0505 08/05/23  0502 08/04/23  0510 08/03/23  0438 08/02/23  0621   BUN mg/dL 37* 35* 34* 34* 35* 33* 30*   CREATININE mg/dL 2.29* 2.19* 2.32* 2.37* 2.26* 2.20* 2.14*   EGFR ml/min/1.73sq m 25 26 24 24 25 26 27

## 2023-08-08 NOTE — ASSESSMENT & PLAN NOTE
· Anemia without evidence of blood loss. Likely chronic disease.   · Improving with diuresis    Results from last 7 days   Lab Units 08/08/23  0443 08/07/23  0450 08/06/23  0505 08/05/23  0502   HEMOGLOBIN g/dL 10.1* 9.5* 9.3* 9.2*

## 2023-08-08 NOTE — PROGRESS NOTES
Pulmonary Consult Note     Patient seen and examined. S/p indwelling pleural catheter. Pigtail retained. CXR with residual effusion and persistent pneumothorax, likely ex-vacuo. Combined chest tube outputs around 200cc/day. BP 95/56 (BP Location: Right arm)   Pulse 95   Temp (!) 97.2 °F (36.2 °C) (Axillary)   Resp 18   Ht 5' 9" (1.753 m)   Wt 68.1 kg (150 lb 1.6 oz)   SpO2 98%   BMI 22.17 kg/m²     Exam  General - Resting comfortably, no distress  Lungs - unlabored breathing. Clear   Chest wall - R pigtail and R indwelling pleural catheter in place connected to drains. Assessment   Parapneumonic Effusion s/p R chest tube and now R indwelling pleural catheter. The patient's family plans to transition the patient to hospice when he returns to rehab. Recommendations   - repeat CXR in the AM. If stable findings, can remove pigtail catheter.    - keep both drains to suction -10 cmH2O

## 2023-08-08 NOTE — SPEECH THERAPY NOTE
Speech/Language Pathology Progress Note    Patient Name: Lyanne Lefort BAT'REJI Date: 8/8/2023     Problem List  Principal Problem:    Aspiration pneumonia ([address])  Active Problems:    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 ([address])    Recent pericardial effusion    Chronic combined systolic and diastolic congestive heart failure (HCC)    Atrial fibrillation with slow ventricular response (HCC)    Acute kidney injury superimposed on chronic kidney disease ([address])    Anemia    Recent empyema of lung with persistent locuted R hydropneumothorax    Pleural effusion exudative    Subjective:  "I'd like some different foods."  Records reviewed. Per MD note: Repeat CXR today showed mild residual right basilar loculated effusion with mild ex vacuo ptx. Plan to keep right chest tube in to suction with repeat CXR tomorrow AM.   Also aware of plan for continued medical mgmt at this time but d/c to SNF with hospice services when appropriate for d/c.     Objective:  Pt was seen for diagnostic dysphagia therapy to assess potential for safe diet upgrade (current diet NDD2/mechanical soft, thin liquid). Pt was alert and positioned upright. Rapid self feeding noted with food and liquids. Pt was assessed with limited amounts of minced meat/mashed pot/gravy/chopped cooked broccoli, 100% large fig bar, magic cup ice cream and thjn diet soda. Dentures were in place. Mastication was timely and effective. Bolus formation and transfer were effective. Swallow initiation appeared prompt. Laryngeal rise was good by palpation. No coughing, throat clearing, c/o stasis, multiple swallows, change in vocal quality noted c intake of food. Cough x1 noted with rapid intake of liquids. Mild/mod cues needed to slow rate of intake. Assessment:  Pt presents with rapid self administration of food/liquid c cough noted x1 with successive sips of thin liquid. Improved mgmt of foods noted. Plan/Recommendations:  Consider upgrade to Level 3 dysphagia advanced diet, continue thin liquid. Continue to cue for slowed rate of intake as indicated.      [facility] [address] [facility] [id number]

## 2023-08-08 NOTE — PROGRESS NOTES
[address]  Progress Note  Name: Yonatan Pozo  MRN: [id number]  Unit/Bed#: 2 [place] [address] A  Date of Admission: 7/27/2023   Date of Service: 8/8/2023 I Hospital Day: 12    Assessment/Plan   * Pleural effusion exudative  Assessment & Plan  Right exudative pleural effusion with history of CKD/CHF on diuretics  · Pulmonary following, appreciate input  · Status post chest tube placement, still draining  · Continue home dose diuretics  · Encourage incentive spirometry  · Aspiration precautions  · Indwelling pleural catheter placed 8//7  · Prior chest tube remains in place secondary to pneumothorax  · Repeat CXR in AM, if stable then pigtail can be removed    Aspiration pneumonia ([address])  Assessment & Plan  History of atrial fibrillation congestive heart failure CKD 4 diabetes and recent pericardial effusion presents to the hospital with shortness of breath found to have recurrent right exudative pleural effusion/parapneumonic effusion  · Sepsis on admission secondary to aspiration pneumonia and effusion  · Completed 7 days cefepime and metronidazole  · Dysphagia: Status post video barium swallow  · Speech recommendations: soft diet with thin liquids    Recent empyema of lung with persistent locuted R hydropneumothorax  Assessment & Plan  · Empyema of the lung requiring chest tube placement on 6/24 and removal on 7/7. Received tPA/dornase administration and 7-day course of cefepime during previous admission. · Chest tube placed again on 7/28 by IR due to persistent right hydropneumothorax. Pleural effusion PH 7.8 with no evidence of recurrent empyema. · Cultures showing no growth   · IR and pulmonary following for chest tube management    Anemia  Assessment & Plan  · Anemia without evidence of blood loss. Likely chronic disease.   · Improving with diuresis    Results from last 7 days   Lab Units 08/08/23  0443 08/07/23  0450 08/06/23  0505 08/05/23  0502   HEMOGLOBIN g/dL 10.1* 9.5* 9.3* 9.2* Acute kidney injury superimposed on chronic kidney disease ([address])  Assessment & Plan  · Sees nephrology as an outpatient baseline creatinine closer to 2.3  · Takes torsemide 40 mg daily at home, monitor on home dose  · Cr currently at baseline    Results from last 7 days   Lab Units 08/08/23  0443 08/07/23  0604 08/06/23  0505 08/05/23  0502 08/04/23  0510 08/03/23  0438 08/02/23  0621   BUN mg/dL 37* 35* 34* 34* 35* 33* 30*   CREATININE mg/dL 2.29* 2.19* 2.32* 2.37* 2.26* 2.20* 2.14*   EGFR ml/min/1.73sq m 25 26 24 24 25 26 27       Atrial fibrillation with slow ventricular response (HCC)  Assessment & Plan  · Intrinsically rate controlled. Continue eliquis    Chronic combined systolic and diastolic congestive heart failure (HCC)  Assessment & Plan  · Combined CHF   · Serous output in chest tube + moist cough  · Given Lasix 40 mg IV x1 8/3  · Continue home Torsemide 40 mg daily  · Monitor I+O, daily weights    Wt Readings from Last 3 Encounters:   07/27/23 68.1 kg (150 lb 1.6 oz)   07/10/23 64.9 kg (143 lb 1.3 oz)   06/28/23 74.5 kg (164 lb 3.9 oz)       Recent pericardial effusion  Assessment & Plan  · Recent pericardiocentesis 6/30 for tamponade. Recovered    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 ([address])  Assessment & Plan  · Diabetes mellitus prior to admission on januvia and glimepiride  · Monitor Accu-Cheks AC plus at bedtime with lispro insulin sliding scale coverage  · Diabetic diet    Results from last 7 days   Lab Units 08/08/23  0443 08/07/23  0604 08/06/23  0505 08/05/23  0502   GLUCOSE RANDOM mg/dL 267* 206* 182* 282*                VTE Pharmacologic Prophylaxis:   Pharmacologic: Apixaban (Eliquis)  Mechanical VTE Prophylaxis in Place: Yes    Patient Centered Rounds: I have performed bedside rounds with nursing staff today.   Discussions with Specialists or Other Care Team Provider: Yes  Education and Discussions with Family / Patient:Yes son updated   Time Spent for Care: 15 minutes. More than 50% of total time spent on counseling and coordination of care as described above. Current Length of Stay: 12 day(s)  Current Patient Status: Inpatient     Discharge Plan: rehab - likely tomorrow     Code Status: Level 3 - DNAR and DNI      Subjective:   Patient sitting in bed with chest tubes draining. He denies any pain or discomfort. He states that his breathing is at baseline. Objective:     Vitals:   Temp (24hrs), Av.4 °F (36.3 °C), Min:97.1 °F (36.2 °C), Max:97.8 °F (36.6 °C)    Temp:  [97.1 °F (36.2 °C)-97.8 °F (36.6 °C)] 97.2 °F (36.2 °C)  HR:  [79-95] 95  Resp:  [16-22] 18  BP: ()/(56-74) 95/56  SpO2:  [98 %-99 %] 98 %  Body mass index is 22.17 kg/m². Input and Output Summary (last 24 hours): Intake/Output Summary (Last 24 hours) at 2023 1451  Last data filed at 2023 1100  Gross per 24 hour   Intake --   Output 192 ml   Net -192 ml        Physical Exam:     Physical Exam  Vitals and nursing note reviewed. Constitutional:       Appearance: Normal appearance. He is ill-appearing (chronically). HENT:      Head: Normocephalic. Cardiovascular:      Rate and Rhythm: Normal rate and regular rhythm. Pulmonary:      Effort: Pulmonary effort is normal.      Comments: Two chest tubes to right chest well   Abdominal:      General: Abdomen is flat. Bowel sounds are normal.      Palpations: Abdomen is soft. Musculoskeletal:         General: Normal range of motion. Skin:     General: Skin is warm and dry. Neurological:      Mental Status: He is alert.          Additional Data:     Labs:    Results from last 7 days   Lab Units 23  0443 23  0450 23  0505 23  0502 23  0510   WBC Thousand/uL 5.29 4.71 4.35 4.67 5.26   HEMOGLOBIN g/dL 10.1* 9.5* 9.3* 9.2* 9.0*   HEMATOCRIT % 31.3* 28.1* 28.9* 27.5* 28.3*   PLATELETS Thousands/uL 146* 201 126* 120* 121*   NEUTROS PCT % 56  --   --  56 59     Results from last 7 days   Lab Units 08/08/23  0443 08/07/23  0604 08/06/23  0505 08/05/23  0502 08/04/23  0510 08/03/23  0438   SODIUM mmol/L 131* 131* 132* 130* 131* 134*   POTASSIUM mmol/L 4.0 3.8 3.8 4.1 4.0 4.1   CHLORIDE mmol/L 96 97 97 96 97 100   CO2 mmol/L 30 30 30 28 26 29   BUN mg/dL 37* 35* 34* 34* 35* 33*   CREATININE mg/dL 2.29* 2.19* 2.32* 2.37* 2.26* 2.20*   CALCIUM mg/dL 7.9* 7.7* 7.9* 8.2* 7.8* 8.0*   TOTAL BILIRUBIN mg/dL  --   --   --  0.54 0.42 0.49   ALK PHOS U/L  --   --   --  126* 138* 113*   ALT U/L  --   --   --  10 10 11   AST U/L  --   --   --  11* 10* 13             Lab Results   Component Value Date/Time    HGBA1C 8.3 (H) 02/23/2023 05:02 AM     Results from last 7 days   Lab Units 08/08/23  1111 08/08/23  0745 08/07/23  2046 08/07/23  1548 08/07/23  1117 08/07/23  0721 08/06/23  2033 08/06/23  1613 08/06/23  1113 08/06/23  0708 08/05/23  2034 08/05/23  1553   POC GLUCOSE mg/dl 314* 311* 330* 240* 281* 238* 341* 333* 308* 196* 243* 152*     Results from last 7 days   Lab Units 08/03/23  0438   PROCALCITONIN ng/ml 0.10       * I Have Reviewed All Lab Data Listed Above. * Additional Pertinent Lab Tests Reviewed: [address] [place] [address] Admission Reviewed    Imaging:     XR chest portable   Final Result by Sarmad Major MD (08/08 1113)      Mild residual loculated right basilar effusion with mild ex vacuo pneumothorax. Right basilar chest tube and tunneled pleural drainage catheter remain in place. Workstation performed: [id number]         CT chest wo contrast   Final Result by Grover Bishop MD (08/07 5864)      Insertion of a second right pleural drainage catheter with near complete drainage of the right pleural effusion with small residual loculated component. Moderate air in the right pleural space, filling the region evacuated by the fluid, likely an ex vacuo pneumothorax.       Persistent mild patchy consolidation in the dependent right upper lobe, infectious or inflammatory. Rounded atelectasis in the right middle and right lower lobes. Persistent moderate left pleural effusion. Workstation performed: [id number]         IR pleural effusion long-term catheter placement   Final Result by Keanu Munroe MD (08/07 1556)      Right tunneled pleural catheter placement. The catheter is constrained within a portion of the empyema by loculations, this was suspected during placement due to difficulty in obtaining significant wire purchase, then confirmed on post placement CT. PLAN:      The existing right posterior chest tube was left in place. Both tubes will be placed to suction overnight, if an AM chest x-ray reveals resolution of the pneumothorax, the posterior right chest tube will be removed. Intermittent drainage as needed for comfort. Workstation performed: [id number]         XR chest portable   Final Result by Hank Vila MD (08/01 1447)      Right-sided pleural drainage catheter remains in place   Right lower lung remains unchanged   No worsening   No pneumothorax            Workstation performed: [id number]         FL barium swallow video w speech   Final Result by MINA DON (07/31 1056)      XR chest portable   Final Result by Danielle Godoy MD (07/31 1601)      Right-sided chest tube with a small right basilar pneumothorax with surrounding airspace opacification. The study was marked in [facility] for immediate notification. Workstation performed: [id number]         IR chest tube placement   Final Result by Katherine Carrera MD (07/28 9571)   Impression:   Successful placement of 10 French all-purpose drainage catheter into the right pleural space under ultrasound guidance, yielding 150 cc of prashanth pleural fluid.                   Workstation performed: [id number]         IR other    (Results Pending)   XR chest portable    (Results Pending)     Imaging Reports Reviewed by myself    Cultures:   Blood Culture:   Lab Results   Component Value Date    BLOODCX No Growth After 5 Days. 07/27/2023    BLOODCX No Growth After 5 Days. 07/27/2023    BLOODCX No Growth After 5 Days. 06/23/2023    BLOODCX Micrococcus luteus (A) 06/23/2023    BLOODCX No Growth After 5 Days. 01/05/2021    BLOODCX No Growth After 5 Days. 01/05/2021     Urine Culture:   Lab Results   Component Value Date    URINECX 10,000-19,000 cfu/ml 07/27/2023     Sputum Culture: No components found for: "Arvis Lauren"  Wound Culture: No results found for: "WOUNDCULT"    Last 24 Hours Medication List:   Current Facility-Administered Medications   Medication Dose Route Frequency Provider Last Rate   • acetaminophen  975 mg Oral Q8H Alejo Edgar MD     • allopurinol  100 mg Oral BID Khurram Hannon DO     • apixaban  2.5 mg Oral BID Khurram Hannon DO     • atorvastatin  40 mg Oral Daily With Dinner Kenney Aguirre DO     • docusate sodium  100 mg Oral BID PRN Alejo Edgar MD     • HYDROmorphone  0.2 mg Intravenous Q4H PRN Alejo Edgar MD     • insulin lispro  1-5 Units Subcutaneous TID AC Kita Jackson PA-C     • insulin lispro  1-5 Units Subcutaneous HS Kita Jackson PA-C     • levalbuterol  1.25 mg Nebulization Q6H SUSANA Cruz     • ondansetron  4 mg Intravenous Q6H PRN Khurram Hannon DO     • oxyCODONE  2.5 mg Oral Q4H PRN Alejo Edgar MD     • tamsulosin  0.4 mg Oral Daily With Dinner Khurram Hannon DO     • timolol  1 drop Both Eyes Daily Kenney Aguirre DO     • torsemide  40 mg Oral Daily SUSANA Grant          Today, Patient Was Seen By: SUSANA Larsen    ** Please Note: Dragon 360 Dictation voice to text software may have been used in the creation of this document.  **

## 2023-08-08 NOTE — ASSESSMENT & PLAN NOTE
Right exudative pleural effusion with history of CKD/CHF on diuretics  · Pulmonary following, appreciate input  · Status post chest tube placement, still draining  · Continue home dose diuretics  · Encourage incentive spirometry  · Aspiration precautions  · Indwelling pleural catheter placed 8//7  · Prior chest tube remains in place secondary to pneumothorax  · Repeat CXR in AM, if stable then pigtail can be removed

## 2023-08-08 NOTE — ASSESSMENT & PLAN NOTE
· Diabetes mellitus prior to admission on januvia and glimepiride  · Monitor Accu-Cheks AC plus at bedtime with lispro insulin sliding scale coverage  · Diabetic diet    Results from last 7 days   Lab Units 08/08/23  0443 08/07/23  0604 08/06/23  0505 08/05/23  0502   GLUCOSE RANDOM mg/dL 267* 206* 182* 282*

## 2023-08-09 ENCOUNTER — APPOINTMENT (INPATIENT)
Dept: RADIOLOGY | Facility: HOSPITAL | Age: 83
DRG: 871 | End: 2023-08-09
Payer: MEDICARE

## 2023-08-09 PROBLEM — J69.0 ASPIRATION PNEUMONIA (HCC): Status: RESOLVED | Noted: 2023-07-27 | Resolved: 2023-08-09

## 2023-08-09 LAB
ANION GAP SERPL CALCULATED.3IONS-SCNC: 6 MMOL/L
BASOPHILS # BLD AUTO: 0.02 THOUSANDS/ÂΜL (ref 0–0.1)
BASOPHILS NFR BLD AUTO: 0 % (ref 0–1)
BUN SERPL-MCNC: 36 MG/DL (ref 5–25)
CALCIUM SERPL-MCNC: 7.6 MG/DL (ref 8.4–10.2)
CHLORIDE SERPL-SCNC: 97 MMOL/L (ref 96–108)
CO2 SERPL-SCNC: 29 MMOL/L (ref 21–32)
CREAT SERPL-MCNC: 2.19 MG/DL (ref 0.6–1.3)
EOSINOPHIL # BLD AUTO: 0.03 THOUSAND/ÂΜL (ref 0–0.61)
EOSINOPHIL NFR BLD AUTO: 1 % (ref 0–6)
ERYTHROCYTE [DISTWIDTH] IN BLOOD BY AUTOMATED COUNT: 16.6 % (ref 11.6–15.1)
GFR SERPL CREATININE-BSD FRML MDRD: 26 ML/MIN/1.73SQ M
GLUCOSE SERPL-MCNC: 207 MG/DL (ref 65–140)
GLUCOSE SERPL-MCNC: 246 MG/DL (ref 65–140)
GLUCOSE SERPL-MCNC: 314 MG/DL (ref 65–140)
GLUCOSE SERPL-MCNC: 329 MG/DL (ref 65–140)
HCT VFR BLD AUTO: 29.8 % (ref 36.5–49.3)
HGB BLD-MCNC: 9.7 G/DL (ref 12–17)
IMM GRANULOCYTES # BLD AUTO: 0.03 THOUSAND/UL (ref 0–0.2)
IMM GRANULOCYTES NFR BLD AUTO: 1 % (ref 0–2)
LYMPHOCYTES # BLD AUTO: 1.39 THOUSANDS/ÂΜL (ref 0.6–4.47)
LYMPHOCYTES NFR BLD AUTO: 25 % (ref 14–44)
MCH RBC QN AUTO: 33.2 PG (ref 26.8–34.3)
MCHC RBC AUTO-ENTMCNC: 32.6 G/DL (ref 31.4–37.4)
MCV RBC AUTO: 102 FL (ref 82–98)
MONOCYTES # BLD AUTO: 0.89 THOUSAND/ÂΜL (ref 0.17–1.22)
MONOCYTES NFR BLD AUTO: 16 % (ref 4–12)
NEUTROPHILS # BLD AUTO: 3.28 THOUSANDS/ÂΜL (ref 1.85–7.62)
NEUTS SEG NFR BLD AUTO: 57 % (ref 43–75)
NRBC BLD AUTO-RTO: 0 /100 WBCS
PLATELET # BLD AUTO: 134 THOUSANDS/UL (ref 149–390)
PMV BLD AUTO: 10.1 FL (ref 8.9–12.7)
POTASSIUM SERPL-SCNC: 4 MMOL/L (ref 3.5–5.3)
RBC # BLD AUTO: 2.92 MILLION/UL (ref 3.88–5.62)
SODIUM SERPL-SCNC: 132 MMOL/L (ref 135–147)
WBC # BLD AUTO: 5.64 THOUSAND/UL (ref 4.31–10.16)

## 2023-08-09 PROCEDURE — 80048 BASIC METABOLIC PNL TOTAL CA: CPT | Performed by: NURSE PRACTITIONER

## 2023-08-09 PROCEDURE — 99232 SBSQ HOSP IP/OBS MODERATE 35: CPT | Performed by: HOSPITALIST

## 2023-08-09 PROCEDURE — 82948 REAGENT STRIP/BLOOD GLUCOSE: CPT

## 2023-08-09 PROCEDURE — 85025 COMPLETE CBC W/AUTO DIFF WBC: CPT | Performed by: NURSE PRACTITIONER

## 2023-08-09 PROCEDURE — 71045 X-RAY EXAM CHEST 1 VIEW: CPT

## 2023-08-09 PROCEDURE — 97535 SELF CARE MNGMENT TRAINING: CPT

## 2023-08-09 PROCEDURE — 94640 AIRWAY INHALATION TREATMENT: CPT

## 2023-08-09 PROCEDURE — 97110 THERAPEUTIC EXERCISES: CPT

## 2023-08-09 PROCEDURE — 97530 THERAPEUTIC ACTIVITIES: CPT

## 2023-08-09 PROCEDURE — 99232 SBSQ HOSP IP/OBS MODERATE 35: CPT | Performed by: NURSE PRACTITIONER

## 2023-08-09 PROCEDURE — 94760 N-INVAS EAR/PLS OXIMETRY 1: CPT

## 2023-08-09 RX ORDER — LEVALBUTEROL INHALATION SOLUTION 1.25 MG/3ML
1.25 SOLUTION RESPIRATORY (INHALATION)
Status: DISCONTINUED | OUTPATIENT
Start: 2023-08-10 | End: 2023-08-10 | Stop reason: HOSPADM

## 2023-08-09 RX ORDER — INSULIN GLARGINE 100 [IU]/ML
5 INJECTION, SOLUTION SUBCUTANEOUS
Status: DISCONTINUED | OUTPATIENT
Start: 2023-08-09 | End: 2023-08-10 | Stop reason: HOSPADM

## 2023-08-09 RX ADMIN — APIXABAN 2.5 MG: 2.5 TABLET, FILM COATED ORAL at 17:07

## 2023-08-09 RX ADMIN — LEVALBUTEROL HYDROCHLORIDE 1.25 MG: 1.25 SOLUTION RESPIRATORY (INHALATION) at 20:15

## 2023-08-09 RX ADMIN — ACETAMINOPHEN 975 MG: 650 SUSPENSION ORAL at 16:29

## 2023-08-09 RX ADMIN — INSULIN LISPRO 3 UNITS: 100 INJECTION, SOLUTION INTRAVENOUS; SUBCUTANEOUS at 07:25

## 2023-08-09 RX ADMIN — ATORVASTATIN CALCIUM 40 MG: 40 TABLET, FILM COATED ORAL at 16:29

## 2023-08-09 RX ADMIN — INSULIN LISPRO 5 UNITS: 100 INJECTION, SOLUTION INTRAVENOUS; SUBCUTANEOUS at 22:52

## 2023-08-09 RX ADMIN — APIXABAN 2.5 MG: 2.5 TABLET, FILM COATED ORAL at 08:29

## 2023-08-09 RX ADMIN — INSULIN LISPRO 5 UNITS: 100 INJECTION, SOLUTION INTRAVENOUS; SUBCUTANEOUS at 16:29

## 2023-08-09 RX ADMIN — INSULIN GLARGINE 5 UNITS: 100 INJECTION, SOLUTION SUBCUTANEOUS at 22:53

## 2023-08-09 RX ADMIN — INSULIN LISPRO 5 UNITS: 100 INJECTION, SOLUTION INTRAVENOUS; SUBCUTANEOUS at 11:35

## 2023-08-09 RX ADMIN — ACETAMINOPHEN 975 MG: 650 SUSPENSION ORAL at 08:29

## 2023-08-09 RX ADMIN — ALLOPURINOL 100 MG: 100 TABLET ORAL at 17:07

## 2023-08-09 RX ADMIN — ACETAMINOPHEN 975 MG: 650 SUSPENSION ORAL at 01:09

## 2023-08-09 RX ADMIN — ALLOPURINOL 100 MG: 100 TABLET ORAL at 08:29

## 2023-08-09 RX ADMIN — TIMOLOL MALEATE 1 DROP: 5 SOLUTION/ DROPS OPHTHALMIC at 08:28

## 2023-08-09 RX ADMIN — LEVALBUTEROL HYDROCHLORIDE 1.25 MG: 1.25 SOLUTION RESPIRATORY (INHALATION) at 13:06

## 2023-08-09 RX ADMIN — TORSEMIDE 40 MG: 20 TABLET ORAL at 08:29

## 2023-08-09 RX ADMIN — TAMSULOSIN HYDROCHLORIDE 0.4 MG: 0.4 CAPSULE ORAL at 16:29

## 2023-08-09 RX ADMIN — LEVALBUTEROL HYDROCHLORIDE 1.25 MG: 1.25 SOLUTION RESPIRATORY (INHALATION) at 07:08

## 2023-08-09 RX ADMIN — LEVALBUTEROL HYDROCHLORIDE 1.25 MG: 1.25 SOLUTION RESPIRATORY (INHALATION) at 02:28

## 2023-08-09 NOTE — CASE MANAGEMENT
Case Management Discharge Planning Note    Patient name Sharath Deng  Location 2 [facility] / [address] MRN [id number]  : 1940 Date 2023       Current Admission Date: 2023  Current Admission Diagnosis:Pleural effusion exudative   Patient Active Problem List    Diagnosis Date Noted   • Pleural effusion exudative 2023   • Sepsis ([address]) 2023   • Goals of care, counseling/discussion 2023   • Aspiration pneumonia ([address]) 2023   • Duodenal ulcer 07/10/2023   • Encephalopathy 2023   • Recent empyema of lung with persistent locuted R hydropneumothorax 2023   • Hypoglycemia 2023   • Thrombocytopenia ([address]) 2023   • Diarrhea 2023   • Hypothermia 2023   • Pleural effusion, right side 2023   • Septic shock ([address]) 2023   • Urinary retention 2023   • Macrocytosis 2023   • Hyponatremia 2023   • Anemia 2023   • Chronic kidney disease-mineral and bone disorder 2023   • Advanced care planning/counseling discussion 2023   • Acute kidney injury superimposed on chronic kidney disease ([address]) 2022   • Benign hypertension with CKD (chronic kidney disease) stage IV ([address]) 2022   • Persistent proteinuria 2022   • COVID-19 2022   • Electrolyte abnormality 2022   • Cellulitis of left upper extremity 2021   • Atrial fibrillation with slow ventricular response ([address]) 2021   • Vitamin D deficiency 10/18/2019   • Chronic combined systolic and diastolic congestive heart failure ([address]) 2019   • Recent pericardial effusion 2019   • Leg edema 01/10/2019   • 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 ([address]) 01/10/2019   • PVC (premature ventricular contraction) 2018   • Essential hypertension 2018   • Closed traumatic displaced fracture of distal end of left radius with malunion 2018      LOS (days): 13  Geometric Mean LOS (GMLOS) (days): 5.00  Days to GMLOS:-7.8     OBJECTIVE:  Risk of Unplanned Readmission Score: 35.95      Current admission status: Inpatient   Preferred Pharmacy:   [facility] Tristen Sanford, [facility] [address] [facility] [address] [place] [address] [address]  [address] [address]  Phone: [phone] Fax: [phone]    Primary Care Provider: Antonio Palacios DO    Primary Insurance: MEDICARE  Secondary Insurance: BLUE CROSS    DISCHARGE DETAILS:    CM contacted family/caregiver?: Yes (Voicemail left for son Colt Salazar.)    Contacts  Patient Contacts: Christelle Hernandez  Relationship to Patient[de-identified] Family  Contact Method: Phone  Phone Number: [phone]  Reason/Outcome: Emergency Contact, Discharge Planning, Continuity of Care    Other Referral/Resources/Interventions Provided:  Interventions: Transportation  Referral Comments: Transport request submitted in [facility] for discharge to [facility] tomorrow. Pickup time is pending. PMN form completed and placed in label bin. SW reached out to [facility] admissions rep Ronalning Josh who confirmed they are ready to admit pt tomorrow. [facility] AP notified.      Treatment Team Recommendation: Facility Return, Short Term Rehab  Discharge Destination Plan[de-identified] Facility Return, Short Term Rehab  Transport at Discharge : [facility]     Number/Name of Dispatcher: [facility]  Transported by [employer] and Unit #): TBD  ETA of Transport (Date): 08/10/23  ETA of Transport (Time):  (TBD)    Accepting Facility Name, [address] [place] [address] : [facility]  Receiving Facility/Agency Phone Number: [phone]

## 2023-08-09 NOTE — ASSESSMENT & PLAN NOTE
· Diabetes mellitus prior to admission on januvia and glimepiride  · Monitor Accu-Cheks AC plus at bedtime with lispro insulin sliding scale coverage  · Add Lantus 5 units HS while hospitalized   · Diabetic diet    Results from last 7 days   Lab Units 08/09/23  0542 08/08/23  0443 08/07/23  0604 08/06/23  0505   GLUCOSE RANDOM mg/dL 207* 267* 206* 182*

## 2023-08-09 NOTE — ASSESSMENT & PLAN NOTE
Right exudative pleural effusion with history of CKD/CHF on diuretics  · Pulmonary following, appreciate input  · Status post chest tube placement  · Indwelling pleural catheter placed 8//7  · Per pulmonary, ok to remove chest tube 8/9  · Spoke with IR.  Awaiting follow-up CXR prior to removing chest tube  · Okay to drain and indwelling pleural catheter every other day up to 500 mL, or more frequently in between if symptomatic  · Spoke with pulm, ok to leave to suction until discharge   · Continue home dose diuretics  · Encourage incentive spirometry  · Aspiration precautions

## 2023-08-09 NOTE — PLAN OF CARE
Problem: INFECTION - ADULT  Goal: Absence or prevention of progression during hospitalization  Description: INTERVENTIONS:  - Assess and monitor for signs and symptoms of infection  - Monitor lab/diagnostic results  - Monitor all insertion sites, i.e. indwelling lines, tubes, and drains  - Monitor endotracheal if appropriate and nasal secretions for changes in amount and color  - Center appropriate cooling/warming therapies per order  - Administer medications as ordered  - Instruct and encourage patient and family to use good hand hygiene technique  - Identify and instruct in appropriate isolation precautions for identified infection/condition  Outcome: Progressing  Goal: Absence of fever/infection during neutropenic period  Description: INTERVENTIONS:  - Monitor WBC    Outcome: Progressing     Problem: METABOLIC, FLUID AND ELECTROLYTES - ADULT  Goal: Electrolytes maintained within normal limits  Description: INTERVENTIONS:  - Monitor labs and assess patient for signs and symptoms of electrolyte imbalances  - Administer electrolyte replacement as ordered  - Monitor response to electrolyte replacements, including repeat lab results as appropriate  - Instruct patient on fluid and nutrition as appropriate  Outcome: Progressing  Goal: Fluid balance maintained  Description: INTERVENTIONS:  - Monitor labs   - Monitor I/O and WT  - Instruct patient on fluid and nutrition as appropriate  - Assess for signs & symptoms of volume excess or deficit  Outcome: Progressing     Problem: RESPIRATORY - ADULT  Goal: Achieves optimal ventilation and oxygenation  Description: INTERVENTIONS:  - Assess for changes in respiratory status  - Assess for changes in mentation and behavior  - Position to facilitate oxygenation and minimize respiratory effort  - Oxygen administered by appropriate delivery if ordered  - Initiate smoking cessation education as indicated  - Encourage broncho-pulmonary hygiene including cough, deep breathe, Incentive Spirometry  - Assess the need for suctioning and aspirate as needed  - Assess and instruct to report SOB or any respiratory difficulty  - Respiratory Therapy support as indicated  Outcome: Progressing     Problem: Nutrition/Hydration-ADULT  Goal: Nutrient/Hydration intake appropriate for improving, restoring or maintaining nutritional needs  Description: Monitor and assess patient's nutrition/hydration status for malnutrition. Collaborate with interdisciplinary team and initiate plan and interventions as ordered. Monitor patient's weight and dietary intake as ordered or per policy. Utilize nutrition screening tool and intervene as necessary. Determine patient's food preferences and provide high-protein, high-caloric foods as appropriate.      INTERVENTIONS:  - Monitor oral intake, urinary output, labs, and treatment plans  - Assess nutrition and hydration status and recommend course of action  - Evaluate amount of meals eaten  - Assist patient with eating if necessary   - Allow adequate time for meals  - Recommend/ encourage appropriate diets, oral nutritional supplements, and vitamin/mineral supplements  - Order, calculate, and assess calorie counts as needed  - Recommend, monitor, and adjust tube feedings and TPN/PPN based on assessed needs  - Assess need for intravenous fluids  - Provide specific nutrition/hydration education as appropriate  - Include patient/family/caregiver in decisions related to nutrition  Outcome: Progressing     Problem: Prexisting or High Potential for Compromised Skin Integrity  Goal: Skin integrity is maintained or improved  Description: INTERVENTIONS:  - Identify patients at risk for skin breakdown  - Assess and monitor skin integrity  - Assess and monitor nutrition and hydration status  - Monitor labs   - Assess for incontinence   - Turn and reposition patient  - Assist with mobility/ambulation  - Relieve pressure over bony prominences  - Avoid friction and shearing  - Provide appropriate hygiene as needed including keeping skin clean and dry  - Evaluate need for skin moisturizer/barrier cream  - Collaborate with interdisciplinary team   - Patient/family teaching  - Consider wound care consult   Outcome: Progressing

## 2023-08-09 NOTE — ASSESSMENT & PLAN NOTE
· Anemia without evidence of blood loss. Likely chronic disease.   · Improving with diuresis    Results from last 7 days   Lab Units 08/09/23  0542 08/08/23  0443 08/07/23  0450 08/06/23  0505   HEMOGLOBIN g/dL 9.7* 10.1* 9.5* 9.3*

## 2023-08-09 NOTE — PROGRESS NOTES
Pulmonary Progress Note     Seen and examined at bedside. Resting well. Comfortable. No significant distress. Vital signs. /75 (BP Location: Left arm)   Pulse 79   Temp 98.2 °F (36.8 °C) (Oral)   Resp 18   Ht 5' 9" (1.753 m)   Wt 68.1 kg (150 lb 1.6 oz)   SpO2 100%   BMI 22.17 kg/m²     Pleural catheter w minimal output from both tunneled catheter and pigtail. Assessment:   Parapneumonic effusion s/p R chest tube and R indwelling pleural catheter. Patient is planned for transition to hospice upon return to rehab. Recommendations:   - ok to remove pigtail catheter today   - can drain indwelling pleural catheter every other day up to 500cc, or more frequently in-between if symptomatic.

## 2023-08-09 NOTE — ASSESSMENT & PLAN NOTE
· Empyema of the lung requiring chest tube placement on 6/24 and removal on 7/7. Received tPA/dornase administration and 7-day course of cefepime during previous admission. · Chest tube placed again on 7/28 by IR due to persistent right hydropneumothorax. Pleural effusion PH 7.8 with no evidence of recurrent empyema.     · Cultures showing no growth   · IR and pulmonary following for chest tube management  · Planning on chest tube removal today  · Okay to leave an indwelling pleural catheter on discharge with recommendations to drain every other day up to 500 mL or more frequently in between if symptomatic

## 2023-08-09 NOTE — OCCUPATIONAL THERAPY NOTE
Occupational Therapy Progress Note     Patient Name: Singh Mcdonough  [id number] Date: 8/9/2023  Problem List  Principal Problem:    Pleural effusion exudative  Active Problems:    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 ([address])    Recent pericardial effusion    Chronic combined systolic and diastolic congestive heart failure (HCC)    Atrial fibrillation with slow ventricular response (HCC)    Acute kidney injury superimposed on chronic kidney disease ([address])    Anemia    Recent empyema of lung with persistent locuted R hydropneumothorax          08/09/23 1520   OT Last Visit   OT Visit Date 08/09/23  (Wednesday)   Note Type   Note Type Treatment   Pain Assessment   Pain Assessment Tool FLACC   Pain Location/Orientation Orientation: Right;Location: Chest  (pt stated "my side hurts")   Effect of Pain on Daily Activities limits activity tolerance during ADLs   Patient's Stated Pain Goal No pain   Hospital Pain Intervention(s) Repositioned; Ambulation/increased activity; Emotional support   Pain Rating: FLACC (Rest) - Face 0   Pain Rating: FLACC (Rest) - Legs 0   Pain Rating: FLACC (Rest) - Activity 0   Pain Rating: FLACC (Rest) - Cry 0   Pain Rating: FLACC (Rest) - Consolability 0   Score: FLACC (Rest) 0   Pain Rating: FLACC (Activity) - Face 1   Pain Rating: FLACC (Activity) - Legs 1   Pain Rating: FLACC (Activity) - Activity 1   Pain Rating: FLACC (Activity) - Cry 1   Pain Rating: FLACC (Activity) - Consolability 1   Score: FLACC (Activity) 5   Restrictions/Precautions   Weight Bearing Precautions Per Order No   Other Precautions Cognitive; Chair Alarm; Bed Alarm;Multiple lines  (2 R sided chest tubes; 1 clamped, 1 to suction)   Lifestyle   Reciprocal Relationships Supportive family   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 [employer] Gratification Pt reports mostly sedentary and sits OOB in her chair, watches tv   ADL   Where Assessed Edge of bed  (vs OOB in chair at end of session)   Eating Assistance 6  Modified independent   Eating Deficit Setup; Increased time to complete   Eating Comments decreased appetite, lunch tray present; did not eat much   Grooming Assistance 5  Supervision/Setup   Grooming Deficit Setup;Supervision/safety; Increased time to complete   Grooming Comments seated OOB in bedside recliner chair to comb hair   UB Bathing Assistance Unable to assess   LB Bathing Assistance Unable to assess   UB Dressing Assistance 4  Minimal Assistance   UB Dressing Deficit Setup;Verbal cueing;Supervision/safety; Increased time to complete;Pull around back   LB Dressing Assistance 2  Maximal Assistance   LB Dressing Deficit Don/doff R sock; Don/doff L sock; Setup;Steadying; Increased time to complete;Supervision/safety;Verbal cueing   Toileting Assistance  Unable to assess   Bed Mobility   Supine to Sit 2  Maximal assistance   Additional items Assist x 1;HOB elevated; Bedrails; Increased time required;Verbal cues;LE management  (to pt's L)   Sit to Supine Unable to assess   Additional Comments Pt seated OOB in chair at end of session w/ PT, Radha present. Chair alarm activated   Transfers   Sit to Stand 3  Moderate assistance   Additional items Assist x 1;Assist x 2; Increased time required;Verbal cues; Bedrails;Armrests  (instruction for hand placement)   Stand to Sit 3  Moderate assistance   Additional items Assist x 2; Increased time required;Verbal cues; Bedrails;Armrests   Additional Comments Pt required mod A x2 using RW to complete steppage transfer from EOB to recliner chair   Functional Mobility   Additional items Rolling walker   Subjective   Subjective "I do not do much but sit around and watch tv"   Cognition   Overall Cognitive Status Impaired  (responsive, cooperative w/ flat affect, slow to respond)   Arousal/Participation Responsive   Attention Attends with cues to redirect   Orientation Level Oriented to person   Memory Decreased recall of recent events  (details, timeiline recent events.)   Following Commands Follows one step commands with increased time or repetition   Comments Identified pt by full name and birthdate. Responsive and agreeable to participate w/ encouragement. Recommend ongoing eval of functional cognition to assist in DC planning   Activity Tolerance   Activity Tolerance Patient limited by fatigue   Medical Staff Made Aware spoke w/ RN and care coordination w/ PT, Radha due to decreased activity tolerance, regression from baseline and skilled physical assistance required   Assessment   Assessment Pt seen for skilled OT tx session focusing on activity engagement. Pt agreeable to participate w/ encouragement. Pt required assist of one to complete bed mobility supine to sit at EOB. Pt engaged in LBD w/ max A, grooming w/ S seated OOB in chair. Pt required A x2 to complete sit to stand and use of RW w/ A x2 to complete steppage transfer. Continue to recommend post acute rehab when medically stable for discharge from acute care. Will continue to follow   Plan   Treatment Interventions ADL retraining;Functional transfer training;UE strengthening/ROM; Endurance training;Patient/family training;Equipment evaluation/education; Compensatory technique education;Continued evaluation; Energy conservation; Activityengagement   Goal Expiration Date 08/12/23   OT Treatment Day 2  (Wed 8/9/23)   OT Frequency 3-5x/wk   Recommendation   OT Discharge Recommendation Post acute rehabilitation services   AM-PAC Daily Activity Inpatient   Lower Body Dressing 2   Bathing 2   Toileting 2   Upper Body Dressing 2   Grooming 3   Eating 4   Daily Activity Raw Score 15   Daily Activity Standardized Score (Calc for Raw Score >=11) 34.69   AM-PAC Applied Cognition Inpatient   Following a Speech/Presentation 3   Understanding Ordinary Conversation 3   Taking Medications 2   Remembering Where Things Are Placed or Put Away 3   Remembering List of 4-5 Errands 3   Taking Care of Complicated Tasks 2 Applied Cognition Raw Score 16   Applied Cognition Standardized Score 35.03   Barthel Index   Feeding 5   Bathing 0   Grooming Score 0   Dressing Score 5   Bladder Score 5   Bowels Score 5   Toilet Use Score 5   Transfers (Bed/Chair) Score 5   Mobility (Level Surface) Score 0   Stairs Score 0   Barthel Index Score 30   Modified [place] Scale   Modified [place] Scale 4   End of Consult   Patient Position at End of Consult Bed/Chair alarm activated; All needs within reach; Bedside chair   Nurse Communication Nurse aware of consult   Licensure   [place] License Number  Woodrow Session, OTR/L [place] 40 [id number]      Goals:" go back"       Eating: independent;     Grooming: supervision seated; ACHIEVED;UPDATED: Mod I    Bathing: min assist and mod assist;  PROGRESSING    Upper Body Dressing supervision;  PROGRESSING    Lower Body Dressing: mod assist;  PROGRESSING    Toileting: max assist;     Patient will increase stand pivot commode transfer to mod assist with rolling walker to increase performance and safety with ADLS and functional mobility; PROGRESSING    Patient will increase standing tolerance to 2 minutes during ADL task to decrease assistance level and decrease fall risk;  PROGRESSING    Patient will increase bed mobility to mod assist in preparation for ADLS and transfers;  Patient will increase functional mobility to and from bathroom with rolling walker with mod assist to increase performance with ADLS and to use a toilet; PROGRESSING    Patient will tolerate 5 minutes of UE ROM/strengthening to increase general activity tolerance and performance in ADLS/IADLS;     Patient will improve functional activity tolerance to 5 minutes of sustained functional tasks to increase participation in basic self-care and decrease assistance level;  PROGRESSING    Patient will be able to to verbalize understanding and perform energy conservation/proper body mechanics during ADLS and functional mobility at least 75% of the time with minimal cueing to decrease signs of fatigue and increase stamina to return to prior level of function;     Patient will increase static sitting balance to fair to improve the ability to sit at edge of bed or on a chair for ADLS;  Patient will increase static standing balance to poor+ to improve postural stability and decrease fall risk during standing ADLS and transfers. PROGRESSING      The patient's raw score on the -PAC Daily Activity Inpatient Short Form is 15. A raw score of less than 19 suggests the patient may benefit from discharge to post-acute rehabilitation services. Please refer to the recommendation of the Occupational Therapist for safe discharge planning.     Hannah Rogers OTR/L  [id number]  [id number]

## 2023-08-09 NOTE — PROGRESS NOTES
[id number] [facility]  Progress Note  Name: Denita Recinos  MRN: [id number]  Unit/Bed#: [address] [place] [address] A  Date of Admission: 7/27/2023   Date of Service: 8/9/2023 I Hospital Day: 13    Assessment/Plan   Aspiration pneumonia (HCC)-resolved as of 8/9/2023  Assessment & Plan  History of atrial fibrillation congestive heart failure CKD 4 diabetes and recent pericardial effusion presents to the hospital with shortness of breath found to have recurrent right exudative pleural effusion/parapneumonic effusion  · Sepsis on admission secondary to aspiration pneumonia and effusion  · Completed 7 days cefepime and metronidazole  · Dysphagia: Status post video barium swallow  · Speech recommendations: soft diet with thin liquids    * Pleural effusion exudative  Assessment & Plan  Right exudative pleural effusion with history of CKD/CHF on diuretics  · Pulmonary following, appreciate input  · Status post chest tube placement  · Indwelling pleural catheter placed 8//7  · Per pulmonary, ok to remove chest tube 8/9  · Spoke with IR. Awaiting follow-up CXR prior to removing chest tube  · Okay to drain and indwelling pleural catheter every other day up to 500 mL, or more frequently in between if symptomatic  · Spoke with pulm, ok to leave to suction until discharge   · Continue home dose diuretics  · Encourage incentive spirometry  · Aspiration precautions    Chronic combined systolic and diastolic congestive heart failure (HCC)  Assessment & Plan  · Combined CHF   · Serous output in chest tube + moist cough  · Given Lasix 40 mg IV x1 8/3  · Continue home Torsemide 40 mg daily  · Monitor I+O, daily weights    Wt Readings from Last 3 Encounters:   07/27/23 68.1 kg (150 lb 1.6 oz)   07/10/23 64.9 kg (143 lb 1.3 oz)   06/28/23 74.5 kg (164 lb 3.9 oz)       Recent empyema of lung with persistent locuted R hydropneumothorax  Assessment & Plan  · Empyema of the lung requiring chest tube placement on 6/24 and removal on 7/7. Received tPA/dornase administration and 7-day course of cefepime during previous admission. · Chest tube placed again on 7/28 by IR due to persistent right hydropneumothorax. Pleural effusion PH 7.8 with no evidence of recurrent empyema. · Cultures showing no growth   · IR and pulmonary following for chest tube management  · Planning on chest tube removal today  · Okay to leave an indwelling pleural catheter on discharge with recommendations to drain every other day up to 500 mL or more frequently in between if symptomatic     Anemia  Assessment & Plan  · Anemia without evidence of blood loss. Likely chronic disease. · Improving with diuresis    Results from last 7 days   Lab Units 08/09/23  0542 08/08/23  0443 08/07/23  0450 08/06/23  0505   HEMOGLOBIN g/dL 9.7* 10.1* 9.5* 9.3*       Acute kidney injury superimposed on chronic kidney disease ([address])  Assessment & Plan  · Sees nephrology as an outpatient baseline creatinine closer to 2.3  · Takes torsemide 40 mg daily at home, monitor on home dose  · Cr currently at baseline    Results from last 7 days   Lab Units 08/09/23  0542 08/08/23  0443 08/07/23  0604 08/06/23  0505 08/05/23  0502 08/04/23  0510 08/03/23  0438   BUN mg/dL 36* 37* 35* 34* 34* 35* 33*   CREATININE mg/dL 2.19* 2.29* 2.19* 2.32* 2.37* 2.26* 2.20*   EGFR ml/min/1.73sq m 26 25 26 24 24 25 26       Atrial fibrillation with slow ventricular response (HCC)  Assessment & Plan  · Intrinsically rate controlled. Continue eliquis    Recent pericardial effusion  Assessment & Plan  · Recent pericardiocentesis 6/30 for tamponade.   Recovered    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 ([address])  Assessment & Plan  · Diabetes mellitus prior to admission on januvia and glimepiride  · Monitor Accu-Cheks AC plus at bedtime with lispro insulin sliding scale coverage  · Add Lantus 5 units HS while hospitalized   · Diabetic diet    Results from last 7 days   Lab Units 23  0542 23  0443 23  0604 23  0505   GLUCOSE RANDOM mg/dL 207* 267* 206* 182*              VTE Pharmacologic Prophylaxis: VTE Score: 7 Moderate Risk (Score 3-4) - Pharmacological DVT Prophylaxis Ordered: apixaban (Eliquis). Patient Centered Rounds: I performed bedside rounds with nursing staff today. Discussions with Specialists or Other Care Team Provider: nursing, CM, pulmonary, IR    Education and Discussions with Family / Patient: Updated  (son) via phone. Total Time Spent on Date of Encounter in care of patient: 35 minutes This time was spent on one or more of the following: performing physical exam; counseling and coordination of care; obtaining or reviewing history; documenting in the medical record; reviewing/ordering tests, medications or procedures; communicating with other healthcare professionals and discussing with patient's family/caregivers. Current Length of Stay: 13 day(s)  Current Patient Status: Inpatient   Certification Statement: The patient will continue to require additional inpatient hospital stay due to chest tube removal   Discharge Plan: Anticipate discharge tomorrow to rehab facility. Code Status: Level 3 - DNAR and DNI    Subjective:   Patient seen and examined at bedside. He is resting comfortably. Tolerating chest tube. Tolerating dwelling pleural catheter. Denies any pain. Breathing okay. Is tired and weak. Denies chest pain. Objective:     Vitals:   Temp (24hrs), Av.6 °F (36.4 °C), Min:96.3 °F (35.7 °C), Max:98.2 °F (36.8 °C)    Temp:  [96.3 °F (35.7 °C)-98.2 °F (36.8 °C)] 98.2 °F (36.8 °C)  HR:  [79-98] 79  Resp:  [16-18] 18  BP: (139-146)/(72-75) 141/75  SpO2:  [98 %-100 %] 99 %  Body mass index is 22.17 kg/m². Input and Output Summary (last 24 hours):      Intake/Output Summary (Last 24 hours) at 2023 1358  Last data filed at 2023 0900  Gross per 24 hour   Intake 400 ml   Output 365 ml   Net 35 ml Physical Exam:   Physical Exam  Vitals and nursing note reviewed. Constitutional:       General: He is not in acute distress. Appearance: He is ill-appearing (Appears deconditioned). He is not toxic-appearing or diaphoretic. HENT:      Head: Normocephalic. Mouth/Throat:      Mouth: Mucous membranes are moist.   Eyes:      Conjunctiva/sclera: Conjunctivae normal.   Cardiovascular:      Rate and Rhythm: Normal rate. Pulmonary:      Effort: Pulmonary effort is normal.      Breath sounds: Normal breath sounds. No wheezing, rhonchi or rales. Abdominal:      General: Bowel sounds are normal. There is no distension. Palpations: Abdomen is soft. Tenderness: There is no abdominal tenderness. Musculoskeletal:         General: Normal range of motion. Cervical back: Normal range of motion. Right lower leg: No edema. Left lower leg: No edema. Skin:     General: Skin is warm and dry. Capillary Refill: Capillary refill takes less than 2 seconds. Comments: 2 chest tubes to right chest wall    Neurological:      Mental Status: He is alert and oriented to person, place, and time. Mental status is at baseline. Motor: Weakness present. Psychiatric:         Mood and Affect: Mood normal.         Behavior: Behavior normal.         Thought Content:  Thought content normal.         Judgment: Judgment normal.          Additional Data:     Labs:  Results from last 7 days   Lab Units 08/09/23  0542   WBC Thousand/uL 5.64   HEMOGLOBIN g/dL 9.7*   HEMATOCRIT % 29.8*   PLATELETS Thousands/uL 134*   NEUTROS PCT % 57   LYMPHS PCT % 25   MONOS PCT % 16*   EOS PCT % 1     Results from last 7 days   Lab Units 08/09/23  0542 08/06/23  0505 08/05/23  0502   SODIUM mmol/L 132*   < > 130*   POTASSIUM mmol/L 4.0   < > 4.1   CHLORIDE mmol/L 97   < > 96   CO2 mmol/L 29   < > 28   BUN mg/dL 36*   < > 34*   CREATININE mg/dL 2.19*   < > 2.37*   ANION GAP mmol/L 6   < > 6   CALCIUM mg/dL 7.6* < > 8.2*   ALBUMIN g/dL  --   --  2.7*   TOTAL BILIRUBIN mg/dL  --   --  0.54   ALK PHOS U/L  --   --  126*   ALT U/L  --   --  10   AST U/L  --   --  11*   GLUCOSE RANDOM mg/dL 207*   < > 282*    < > = values in this interval not displayed. Results from last 7 days   Lab Units 08/09/23  1059 08/09/23  0717 08/08/23  2056 08/08/23  1551 08/08/23  1111 08/08/23  0745 08/07/23  2046 08/07/23  1548 08/07/23  1117 08/07/23  0721 08/06/23  2033 08/06/23  1613   POC GLUCOSE mg/dl 329* 246* 342* 325* 314* 311* 330* 240* 281* 238* 341* 333*         Results from last 7 days   Lab Units 08/03/23  0438   PROCALCITONIN ng/ml 0.10       Lines/Drains:  Invasive Devices     Peripheral Intravenous Line  Duration           Peripheral IV 08/06/23 Dorsal (posterior); Left Forearm 3 days          Drain  Duration           Chest Tube 1 Right Pleural 10 Fr. 11 days    Pleural Effusion Long-Term Catheter 16 Fr. 1 day                      Imaging: Reviewed radiology reports from this admission including: chest xray    Recent Cultures (last 7 days):         Last 24 Hours Medication List:   Current Facility-Administered Medications   Medication Dose Route Frequency Provider Last Rate   • acetaminophen  975 mg Oral Q8H Marcus Osman MD     • allopurinol  100 mg Oral BID Garth Jasmine DO     • apixaban  2.5 mg Oral BID Garth Jasmine DO     • atorvastatin  40 mg Oral Daily With Dinner Kenney Gonzalez DO     • docusate sodium  100 mg Oral BID PRN Marcus Osman MD     • HYDROmorphone  0.2 mg Intravenous Q4H PRN Marcus Osman MD     • insulin glargine  5 Units Subcutaneous HS SUSANA Smith     • insulin lispro  1-6 Units Subcutaneous TID AC SUSANA Cage     • insulin lispro  1-6 Units Subcutaneous HS SUSANA Cage     • levalbuterol  1.25 mg Nebulization Q6H SUSANA Nance     • ondansetron  4 mg Intravenous Q6H PRN Garth Jasmine DO     • oxyCODONE  2.5 mg Oral Q4H PRN Noy Swan Nixon Steward MD     • tamsulosin  0.4 mg Oral Daily With Dinner Kenney Brooks DO     • timolol  1 drop Both Eyes Daily Kenney Brooks DO     • torsemide  40 mg Oral Daily SUSANA Graff          Today, Patient Was Seen By: SUSANA Graff    **Please Note: This note may have been constructed using a voice recognition system. **

## 2023-08-09 NOTE — PHYSICAL THERAPY NOTE
PT TREATMENT     08/09/23 1431   PT Last Visit   PT Visit Date 08/09/23   Note Type   Note Type Treatment   Pain Assessment   Pain Assessment Tool 0-10   Pain Score No Pain   Restrictions/Precautions   Weight Bearing Precautions Per Order No   Other Precautions Chair Alarm; Bed Alarm; Fall Risk;Pain  (double chest tubes on right side)   General   Chart Reviewed Yes   Family/Caregiver Present No   Cognition   Overall Cognitive Status Impaired  (lethargic but cooperative)   Arousal/Participation Cooperative   Attention Attends with cues to redirect   Following Commands Follows one step commands with increased time or repetition   Subjective   Subjective Pt asked how to get volume on the TV once in chair   Bed Mobility   Supine to Sit 2  Maximal assistance   Additional items Assist x 1;Verbal cues; Increased time required;LE management;HOB elevated   Additional Comments to chair   Transfers   Sit to Stand 3  Moderate assistance   Additional items Assist x 2;Verbal cues   Stand to Sit 3  Moderate assistance   Additional items Assist x 2;Verbal cues   Ambulation/Elevation   Gait pattern Foward flexed; Short stride; Excessively slow   Gait Assistance 3  Moderate assist   Additional items Assist x 2;Verbal cues   Assistive Device Rolling walker   Distance 4 feet   Stair Management Assistance Not tested   Ambulation/Elevation Additional Comments pt with two chest tubes on right side. Balance   Static Sitting Fair -   Static Standing Poor +   Ambulatory Poor +  (with RW)   Activity Tolerance   Activity Tolerance Patient limited by fatigue;Patient limited by pain  (limited by chest tubes and generalized weakness)   Nurse Made Aware yes: Francena Spurling: pt in recliner chair   Exercises   Quad Sets Sitting;5 reps;Bilateral   Heelslides Sitting;10 reps;AAROM; Bilateral   Hip Abduction Sitting;10 reps;AAROM; Bilateral   Knee AROM Long Arc Quad Sitting;5 reps;Bilateral   Ankle Pumps Sitting;10 reps;Bilateral   Balance training  with RW taking several steps from bed > chair   Assessment   Prognosis Good   Problem List Decreased strength;Decreased endurance; Impaired balance;Decreased mobility; Decreased skin integrity;Pain   Assessment Pt is cooperative despite having two chest tubes which are painful at the insertion site, but toleable. Pt is generally very weak and deconditioned due to prolonged bedrest.  Pt is also not acitive prior to admit, per pt.  "I don't do much"  Pt will benefit from continued PT to improve strength and endurance. The patient's AM-PAC Basic Mobility Inpatient Short Form Raw Score is 11. A Raw score of less than or equal to 16 suggests the patient may benefit from discharge to post-acute rehabilitation services. Please also refer to the recommendation of the Physical Therapist for safe discharge planning. Goals   Patient Goals to get stronger and go home   Plan   Treatment/Interventions ADL retraining;Functional transfer training;LE strengthening/ROM; Therapeutic exercise; Endurance training;Cognitive reorientation;Patient/family training;Equipment eval/education; Bed mobility;Gait training;Spoke to nursing;OT   Progress Progressing toward goals   PT Frequency Other (Comment)  (5/wk)   Recommendation   PT Discharge Recommendation Post acute rehabilitation services   AM-PAC Basic Mobility Inpatient   Turning in Flat Bed Without Bedrails 2   Lying on Back to Sitting on Edge of Flat Bed Without Bedrails 2   Moving Bed to Chair 2   Standing Up From Chair Using Arms 2   Walk in Room 2   Climb 3-5 Stairs With Railing 1   Basic Mobility Inpatient Raw Score 11   Basic Mobility Standardized Score 30.25   Turning Head Towards Sound 3   Follow Simple Instructions 3   Low Function Basic Mobility Raw Score  17   Low Function Basic Mobility Standardized Score  27.46   Highest Level Of Mobility   -HLM Goal 4: Move to chair/commode   JH-HLM Achieved 4: Move to chair/commode   End of Consult   Patient Position at End of Consult Bedside chair;Bed/Chair alarm activated; All needs within reach   Licensure   [address] [facility] Number  Marily Otero. [facility]  [id number]

## 2023-08-09 NOTE — ASSESSMENT & PLAN NOTE
· Sees nephrology as an outpatient baseline creatinine closer to 2.3  · Takes torsemide 40 mg daily at home, monitor on home dose  · Cr currently at baseline    Results from last 7 days   Lab Units 08/09/23  0542 08/08/23  0443 08/07/23  0604 08/06/23  0505 08/05/23  0502 08/04/23  0510 08/03/23  0438   BUN mg/dL 36* 37* 35* 34* 34* 35* 33*   CREATININE mg/dL 2.19* 2.29* 2.19* 2.32* 2.37* 2.26* 2.20*   EGFR ml/min/1.73sq m 26 25 26 24 24 25 26

## 2023-08-10 VITALS
HEART RATE: 76 BPM | WEIGHT: 150.1 LBS | HEIGHT: 69 IN | TEMPERATURE: 97.6 F | SYSTOLIC BLOOD PRESSURE: 118 MMHG | DIASTOLIC BLOOD PRESSURE: 61 MMHG | BODY MASS INDEX: 22.23 KG/M2 | RESPIRATION RATE: 18 BRPM | OXYGEN SATURATION: 98 %

## 2023-08-10 PROBLEM — K59.00 CONSTIPATION: Status: ACTIVE | Noted: 2023-08-10

## 2023-08-10 LAB
ANION GAP SERPL CALCULATED.3IONS-SCNC: 6 MMOL/L
BUN SERPL-MCNC: 37 MG/DL (ref 5–25)
CALCIUM SERPL-MCNC: 7.7 MG/DL (ref 8.4–10.2)
CHLORIDE SERPL-SCNC: 94 MMOL/L (ref 96–108)
CO2 SERPL-SCNC: 30 MMOL/L (ref 21–32)
CREAT SERPL-MCNC: 2.05 MG/DL (ref 0.6–1.3)
ERYTHROCYTE [DISTWIDTH] IN BLOOD BY AUTOMATED COUNT: 16.7 % (ref 11.6–15.1)
GFR SERPL CREATININE-BSD FRML MDRD: 28 ML/MIN/1.73SQ M
GLUCOSE SERPL-MCNC: 129 MG/DL (ref 65–140)
GLUCOSE SERPL-MCNC: 139 MG/DL (ref 65–140)
GLUCOSE SERPL-MCNC: 147 MG/DL (ref 65–140)
GLUCOSE SERPL-MCNC: 180 MG/DL (ref 65–140)
GLUCOSE SERPL-MCNC: 235 MG/DL (ref 65–140)
GLUCOSE SERPL-MCNC: 35 MG/DL (ref 65–140)
GLUCOSE SERPL-MCNC: 38 MG/DL (ref 65–140)
HCT VFR BLD AUTO: 30.9 % (ref 36.5–49.3)
HGB BLD-MCNC: 9.9 G/DL (ref 12–17)
MAGNESIUM SERPL-MCNC: 1.7 MG/DL (ref 1.9–2.7)
MCH RBC QN AUTO: 33 PG (ref 26.8–34.3)
MCHC RBC AUTO-ENTMCNC: 32 G/DL (ref 31.4–37.4)
MCV RBC AUTO: 103 FL (ref 82–98)
PHOSPHATE SERPL-MCNC: 3.4 MG/DL (ref 2.3–4.1)
PLATELET # BLD AUTO: 128 THOUSANDS/UL (ref 149–390)
PMV BLD AUTO: 9.9 FL (ref 8.9–12.7)
POTASSIUM SERPL-SCNC: 3.8 MMOL/L (ref 3.5–5.3)
RBC # BLD AUTO: 3 MILLION/UL (ref 3.88–5.62)
SODIUM SERPL-SCNC: 130 MMOL/L (ref 135–147)
WBC # BLD AUTO: 6.22 THOUSAND/UL (ref 4.31–10.16)

## 2023-08-10 PROCEDURE — 80048 BASIC METABOLIC PNL TOTAL CA: CPT | Performed by: NURSE PRACTITIONER

## 2023-08-10 PROCEDURE — 85027 COMPLETE CBC AUTOMATED: CPT | Performed by: NURSE PRACTITIONER

## 2023-08-10 PROCEDURE — 82948 REAGENT STRIP/BLOOD GLUCOSE: CPT

## 2023-08-10 PROCEDURE — 84100 ASSAY OF PHOSPHORUS: CPT | Performed by: NURSE PRACTITIONER

## 2023-08-10 PROCEDURE — 99239 HOSP IP/OBS DSCHRG MGMT >30: CPT

## 2023-08-10 PROCEDURE — 83735 ASSAY OF MAGNESIUM: CPT | Performed by: NURSE PRACTITIONER

## 2023-08-10 PROCEDURE — 94640 AIRWAY INHALATION TREATMENT: CPT

## 2023-08-10 PROCEDURE — 94760 N-INVAS EAR/PLS OXIMETRY 1: CPT

## 2023-08-10 RX ORDER — INSULIN LISPRO 100 [IU]/ML
1-5 INJECTION, SOLUTION INTRAVENOUS; SUBCUTANEOUS
Status: DISCONTINUED | OUTPATIENT
Start: 2023-08-10 | End: 2023-08-10 | Stop reason: HOSPADM

## 2023-08-10 RX ORDER — DOCUSATE SODIUM 100 MG/1
100 CAPSULE, LIQUID FILLED ORAL 2 TIMES DAILY PRN
Refills: 0
Start: 2023-08-10

## 2023-08-10 RX ORDER — DEXTROSE MONOHYDRATE 25 G/50ML
25 INJECTION, SOLUTION INTRAVENOUS ONCE
Status: COMPLETED | OUTPATIENT
Start: 2023-08-10 | End: 2023-08-10

## 2023-08-10 RX ORDER — INSULIN GLARGINE 100 [IU]/ML
5 INJECTION, SOLUTION SUBCUTANEOUS
Qty: 10 ML | Refills: 0
Start: 2023-08-10

## 2023-08-10 RX ORDER — MAGNESIUM SULFATE HEPTAHYDRATE 40 MG/ML
2 INJECTION, SOLUTION INTRAVENOUS ONCE
Status: COMPLETED | OUTPATIENT
Start: 2023-08-10 | End: 2023-08-10

## 2023-08-10 RX ORDER — DEXTROSE MONOHYDRATE 25 G/50ML
12.5 INJECTION, SOLUTION INTRAVENOUS ONCE
Status: COMPLETED | OUTPATIENT
Start: 2023-08-10 | End: 2023-08-10

## 2023-08-10 RX ORDER — INSULIN LISPRO 100 [IU]/ML
1-5 INJECTION, SOLUTION INTRAVENOUS; SUBCUTANEOUS
Status: DISCONTINUED | OUTPATIENT
Start: 2023-08-11 | End: 2023-08-10 | Stop reason: HOSPADM

## 2023-08-10 RX ADMIN — TIMOLOL MALEATE 1 DROP: 5 SOLUTION/ DROPS OPHTHALMIC at 08:21

## 2023-08-10 RX ADMIN — APIXABAN 2.5 MG: 2.5 TABLET, FILM COATED ORAL at 08:20

## 2023-08-10 RX ADMIN — LEVALBUTEROL HYDROCHLORIDE 1.25 MG: 1.25 SOLUTION RESPIRATORY (INHALATION) at 07:03

## 2023-08-10 RX ADMIN — ALLOPURINOL 100 MG: 100 TABLET ORAL at 08:20

## 2023-08-10 RX ADMIN — ACETAMINOPHEN 975 MG: 650 SUSPENSION ORAL at 01:32

## 2023-08-10 RX ADMIN — ACETAMINOPHEN 975 MG: 650 SUSPENSION ORAL at 08:20

## 2023-08-10 RX ADMIN — MAGNESIUM SULFATE HEPTAHYDRATE 2 G: 40 INJECTION, SOLUTION INTRAVENOUS at 08:20

## 2023-08-10 RX ADMIN — DEXTROSE MONOHYDRATE 25 ML: 25 INJECTION, SOLUTION INTRAVENOUS at 03:17

## 2023-08-10 RX ADMIN — TORSEMIDE 40 MG: 20 TABLET ORAL at 08:20

## 2023-08-10 RX ADMIN — INSULIN LISPRO 1 UNITS: 100 INJECTION, SOLUTION INTRAVENOUS; SUBCUTANEOUS at 08:21

## 2023-08-10 RX ADMIN — DEXTROSE MONOHYDRATE 12.5 G: 25 INJECTION, SOLUTION INTRAVENOUS at 03:11

## 2023-08-10 RX ADMIN — INSULIN LISPRO 2 UNITS: 100 INJECTION, SOLUTION INTRAVENOUS; SUBCUTANEOUS at 11:30

## 2023-08-10 NOTE — DISCHARGE SUMMARY
[id number] [facility]  Discharge- Lauren Ventura 1940, 78 y.o. male MRN: [id number]  Unit/Bed#: 2 [place] [address] A Encounter: [id number]  Primary Care Provider: Adela Samaniego DO   Date and time admitted to hospital: 7/27/2023  4:31 PM    * Pleural effusion exudative  Assessment & Plan  Right exudative pleural effusion with history of CKD/CHF on diuretics  · Pulmonary following, appreciate input  · Status post chest tube placement  · Indwelling pleural catheter placed 8//7  · Per pulmonary, ok to remove chest tube 8/9  · Okay to drain indwelling pleural catheter every other day up to 500 mL, or more frequently in between if symptomatic  · Continue home dose diuretics  · Encourage incentive spirometry  · Aspiration precautions    Recent empyema of lung with persistent locuted R hydropneumothorax  Assessment & Plan  · Empyema of the lung requiring chest tube placement on 6/24 and removal on 7/7. Received tPA/dornase administration and 7-day course of cefepime during previous admission. · Chest tube placed again on 7/28 by IR due to persistent right hydropneumothorax. Pleural effusion PH 7.8 with no evidence of recurrent empyema. · Cultures showing no growth   · IR and pulmonary following for chest tube management  · Chest tube removed 8/9  · Okay to leave an indwelling pleural catheter on discharge with recommendations to drain every other day up to 500 mL or more frequently in between if symptomatic     Anemia  Assessment & Plan  · Anemia without evidence of blood loss. Likely chronic disease.   · Improving with diuresis    Results from last 7 days   Lab Units 08/10/23  0607 08/09/23  0542 08/08/23  0443 08/07/23  0450   HEMOGLOBIN g/dL 9.9* 9.7* 10.1* 9.5*       Acute kidney injury superimposed on chronic kidney disease ([address])  Assessment & Plan  · Sees nephrology as an outpatient baseline creatinine closer to 2.3  · Takes torsemide 40 mg daily at home, monitor on home dose  · Cr currently at baseline    Results from last 7 days   Lab Units 08/10/23  0607 08/09/23  0542 08/08/23  0443 08/07/23  0604 08/06/23  0505 08/05/23  0502 08/04/23  0510   BUN mg/dL 37* 36* 37* 35* 34* 34* 35*   CREATININE mg/dL 2.05* 2.19* 2.29* 2.19* 2.32* 2.37* 2.26*   EGFR ml/min/1.73sq m 28 26 25 26 24 24 25       Atrial fibrillation with slow ventricular response ([address])  Assessment & Plan  · Intrinsically rate controlled. Continue eliquis    Chronic combined systolic and diastolic congestive heart failure (HCC)  Assessment & Plan  · Combined CHF   · Continue home Torsemide 40 mg daily  · Monitor I+O, daily weights    Wt Readings from Last 3 Encounters:   07/27/23 68.1 kg (150 lb 1.6 oz)   07/10/23 64.9 kg (143 lb 1.3 oz)   06/28/23 74.5 kg (164 lb 3.9 oz)       Recent pericardial effusion  Assessment & Plan  · Recent pericardiocentesis 6/30 for tamponade. Recovered    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 ([address])  Assessment & Plan  · Diabetes mellitus prior to admission on januvia and glimepiride  · Monitor Accu-Cheks AC plus at bedtime with lispro insulin sliding scale coverage  · Add Lantus 5 units HS while hospitalized   · Patient noted to be lethargic and confused overnight 8/9, BS in 30s  · Mentation improved with D50  · Decreased Humalog coverage to algorithm 1 and added 2AM blood sugar check. Continue Lantus 5 units at bedtime.   · Diabetic diet    Results from last 7 days   Lab Units 08/10/23  0607 08/09/23  0542 08/08/23  0443 08/07/23  0604   GLUCOSE RANDOM mg/dL 147* 207* 267* 206*       Aspiration pneumonia (HCC)-resolved as of 8/9/2023  Assessment & Plan  History of atrial fibrillation congestive heart failure CKD 4 diabetes and recent pericardial effusion presents to the hospital with shortness of breath found to have recurrent right exudative pleural effusion/parapneumonic effusion  · Sepsis on admission secondary to aspiration pneumonia and effusion  · Completed 7 days cefepime and metronidazole  · Dysphagia: Status post video barium swallow  · Speech recommendations: soft diet with thin liquids    Medical Problems     Resolved Problems  Date Reviewed: 8/10/2023          Resolved    Aspiration pneumonia ([address]) 8/9/2023     Resolved by  Stella Griggs PA-C        Discharging Physician / Practitioner: Stella Griggs PA-C  PCP: Vivek Espinosa DO  Admission Date:   Admission Orders (From admission, onward)     Ordered        07/27/23 1731  Inpatient Admission  Once                      Discharge Date: 08/10/23    Consultations During Hospital Stay:  · Pulmonology  · Interventional radiology    Procedures Performed:   · 7/28/26: IR chest tube placement  · Successful placement of 10 [place]i all-purpose drainage catheter into the right pleural space under ultrasound guidance, yielding 150 cc of prashanth pleural fluid. · 8/7/23: IR pleural effusion long-term catheter placement  · Right tunneled pleural catheter placement. The catheter is constrained within a portion of the empyema by loculations, this was suspected during placement due to difficulty in obtaining significant wire purchase, then confirmed on post placement CT. Significant Findings / Test Results:   · CXR 7/30: Right-sided chest tube with a small right basilar pneumothorax with surrounding airspace opacification. · CXR 8/1: Right-sided pleural drainage catheter remains in place. Right lower lung remains unchanged. No worsening. No pneumothorax. · CT chest: Insertion of a second right pleural drainage catheter with near complete drainage of the right pleural effusion with small residual loculated component. Moderate air in the right pleural space, filling the region evacuated by the fluid, likely an ex vacuo pneumothorax. Persistent mild patchy consolidation in the dependent right upper lobe, infectious or inflammatory. Rounded atelectasis in the right middle and right lower lobes.  Persistent moderate left pleural effusion. · CXR 8/8: Mild residual loculated right basilar effusion with mild ex vacuo pneumothorax. Right basilar chest tube and tunneled pleural drainage catheter remain in place. · CXR 8/9: Loculated right basilar hydropneumothorax again identified with associated right basilar opacity. · CXR 8/9: Stable loculated small right basilar hydropneumothorax with associated right lung base opacity. Incidental Findings:   · none    Test Results Pending at Discharge (will require follow up):   · none     Outpatient Tests Requested:  · none    Complications:  none    Reason for Admission: aspiration pneumonia, hydropneumothorax    Hospital Course:   Alison Ansari is a 78 y.o. male patient who originally presented to the hospital on 7/27/2023 due to aspiration pneumonia an loculated right hydropneumothorax seen on outpatient CT after patient was experiencing shortness of breath. Right chest tube was placed by IR on 7/28 and indwelling pleural catheter was placed on 8/7 for palliative purposes. CT after placement of tunneled catheter shows large pneumothorax within empyema space as there was difficulty advancing the tube into position. PTX improved and chest tube was removed on 8/9 by IR. Patient completed 7 day course of antibiotics for aspiration pneumonia. Patient has symptomatically improved and is hemodynamically stable. Patient will be discharged to short term rehab and family is considering eventual transition to hospice at the facility. Please see above list of diagnoses and related plan for additional information. Condition at Discharge: stable    Discharge Day Visit / Exam:   Subjective:  Patient seen and examined at bedside this morning resting comfortably. He denies any symptoms including chest pain or shortness of breath. Patient was glad to hear news he can be discharged from hospital today.   Vitals: Blood Pressure: 118/61 (08/10/23 0733)  Pulse: 80 (08/10/23 0733)  Temperature: 97.6 °F (36.4 °C) (08/10/23 0733)  Temp Source: Oral (08/10/23 0733)  Respirations: 18 (08/10/23 0733)  Height: 5' 9" (175.3 cm) (07/27/23 2105)  Weight - Scale: 68.1 kg (150 lb 1.6 oz) (07/27/23 2105)  SpO2: 95 % (08/10/23 0733)  Exam:   Physical Exam  Vitals and nursing note reviewed. Constitutional:       General: He is not in acute distress. Appearance: He is well-developed. He is ill-appearing (chronic). Cardiovascular:      Rate and Rhythm: Normal rate and regular rhythm. Heart sounds: No murmur heard. Pulmonary:      Effort: Pulmonary effort is normal. No respiratory distress. Breath sounds: Decreased breath sounds present. Comments: Right indwelling pleural catheter in place  Abdominal:      Palpations: Abdomen is soft. Tenderness: There is no abdominal tenderness. Musculoskeletal:         General: No swelling. Skin:     General: Skin is warm and dry. Neurological:      Mental Status: He is alert. Psychiatric:         Mood and Affect: Mood normal.         Discussion with Family: Updated  (son) via phone. Discharge instructions/Information to patient and family:   See after visit summary for information provided to patient and family. Provisions for Follow-Up Care:  See after visit summary for information related to follow-up care and any pertinent home health orders. Disposition:   Other [address] [place] [address]    Planned Readmission: none     Discharge Statement:  I spent >30 minutes discharging the patient. This time was spent on the day of discharge. I had direct contact with the patient on the day of discharge. Greater than 50% of the total time was spent examining patient, answering all patient questions, arranging and discussing plan of care with patient as well as directly providing post-discharge instructions. Additional time then spent on discharge activities.     Discharge Medications:  See after visit summary for reconciled discharge medications provided to patient and/or family.       **Please Note: This note may have been constructed using a voice recognition system**

## 2023-08-10 NOTE — [place] UNIVERSAL TRANSFER FORM
[place] UNIVERSAL TRANSFER FORM  (ALL ITEMS MUST BE COMPLETED)    1. TRAN[place] [address] [facility] [place]    2. DATE OF TRANSFER: 8/10/2023                        TIME OF TRANSFER: 1145    3. PATIENT NAME: AFTAB Hancock      YOB: 1940                             GENDER: male    4. LANGUAGE:   English    5. PHYSICIAN NAME:  Oriana Reddy DO                   PHONE: [phone]    6. CODE STATUS: Level 3 - DNAR and DNI        Out of Hospital DNR Attached: No    7. :                                      :  Extended Emergency Contact Information  Primary Emergency Contact: DOUG [place]  Address: [address] [place] [address], [facility] [place] [address]  Mobile Phone: [phone]  Relation: Son  Secondary Emergency Contact: GraceMarleneGisselle  [place] Phone: [phone]  Relation: Sister In-Law           Health Care Representative/Proxy:  No           Legal Guardian:  No             NAME OF:           HEALTH CARE REPRESENTATIVE/PROXY:                                         OR           LEGAL GUARDIAN, IF NOT :                                               PHONE:  (Day)           (Night)                        (Cell)    8. REASON FOR TRANSFER: Hospitalization for aspiration pneumonia. Right exudative pleural effusion with history of CKD/CHF on diuretics, atrial fibrillation, diabetes and recent pericardial effusion presents to the hospital with shortness of breath found to have recurrent right exudative pleural effusion/parapneumonic effusion. R chest pleural catheter in place. Plan to drain and indwelling pleural catheter every other day up to 500 mL, or more frequently in between if symptomatic.             V/S: /61 (BP Location: Right arm)   Pulse 80   Temp 97.6 °F (36.4 °C) (Oral)   Resp 18   Ht 5' 9" (1.753 m)   Wt 68.1 kg (150 lb 1.6 oz)   SpO2 95% BMI 22.17 kg/m²           PAIN: None    9. PRIMARY DIAGNOSIS: Pleural effusion exudative      Secondary Diagnosis:         Pacemaker: No      Internal Defib: No          Mental Health Diagnosis (if Applicable):    10. RESTRAINTS: No     11. RESPIRATORY NEEDS: None    12. ISOLATION/PRECAUTION: None    13. ALLERGY: Elemental sulfur and Sulfa antibiotics    14. SENSORY:       Hearing Poor  -Hearing aid to left ear    15. SKIN CONDITION: Yes: MASD groin, buttocks. Left posterior tibial venous ulcer. 16. DIET: Special (describe) Dysphagia 3-Dental Soft; Thin Liquid; Consistent Carbohydrate Diet Level 2 (5 carb servings/75 grams CHO/meal), Dysphagia 3-Dental Soft    17. IV ACCESS: None    18. PERSONAL ITEMS SENT WITH PATIENT: Glasses, left hearing aid. 19. ATTACHED DOCUMENTS: MUST ATTACH CURRENT MEDICATION INFORMATION Face Sheet, MAR, PT Note, OT Note and HX/PE    20. AT RISK ALERTS:Falls, Pressure Ulcer and Aspiration        HARM TO: N/A    21. WEIGHT BEARING STATUS:         Left Leg: Full        Right Leg: Full    22. MENTAL STATUS:Alert, Oriented and Forgetful    23. FUNCTION:        Walk: With Help- Max Ax2 STP w/RW to chair        Transfer: With Help        Toilet: With Help        Feed: With Help- Set up feed    24. IMMUNIZATIONS/SCREENING:     Immunization History   Administered Date(s) Administered   • Influenza, high dose seasonal 0.7 mL 01/05/2021   • Pneumococcal Conjugate 13-Valent 01/13/2019   • Tdap 01/05/2021       25. BOWEL: Incontinent  and Date Last BM 8/9/23    26. BLADDER: incontinent    27.  SENDING FACILITY CONTACT: Araceli Ni RN                  Title: RN        Unit: [address] [place] [address]        Phone: [phone] [address] (if known):        Title:        Unit:         Phone:         FORM PREFILLED BY (if applicable)       Title:       Unit:        Phone:         FORM COMPLETED BY Kellie Nunn RN      Title: ROBERTO      Phone: [phone]

## 2023-08-10 NOTE — ASSESSMENT & PLAN NOTE
· Anemia without evidence of blood loss. Likely chronic disease.   · Improving with diuresis    Results from last 7 days   Lab Units 08/10/23  0607 08/09/23  0542 08/08/23  0443 08/07/23  0450   HEMOGLOBIN g/dL 9.9* 9.7* 10.1* 9.5*

## 2023-08-10 NOTE — QUICK NOTE
Patient was found poorly responsive, only answers "okay" to questions per RN. Blood sugar 38. Patient given D50 50 mL with mentation returned to baseline. Decreased Humalog coverage to algorithm 1 and added 2AM blood sugar check as well. Continue Lantus 5 units at bedtime.

## 2023-08-10 NOTE — ASSESSMENT & PLAN NOTE
· Diabetes mellitus prior to admission on januvia and glimepiride  · Monitor Accu-Cheks AC plus at bedtime with lispro insulin sliding scale coverage  · Add Lantus 5 units HS while hospitalized   · Patient noted to be lethargic and confused overnight 8/9, BS in 30s  · Mentation improved with D50  · Decreased Humalog coverage to algorithm 1 and added 2AM blood sugar check. Continue Lantus 5 units at bedtime.   · Diabetic diet    Results from last 7 days   Lab Units 08/10/23  0607 08/09/23  0542 08/08/23  0443 08/07/23  0604   GLUCOSE RANDOM mg/dL 147* 207* 267* 206*

## 2023-08-10 NOTE — PLAN OF CARE
Problem: INFECTION - ADULT  Goal: Absence or prevention of progression during hospitalization  Description: INTERVENTIONS:  - Assess and monitor for signs and symptoms of infection  - Monitor lab/diagnostic results  - Monitor all insertion sites, i.e. indwelling lines, tubes, and drains  - Monitor endotracheal if appropriate and nasal secretions for changes in amount and color  - Veblen appropriate cooling/warming therapies per order  - Administer medications as ordered  - Instruct and encourage patient and family to use good hand hygiene technique  - Identify and instruct in appropriate isolation precautions for identified infection/condition  Outcome: Progressing  Goal: Absence of fever/infection during neutropenic period  Description: INTERVENTIONS:  - Monitor WBC    Outcome: Progressing     Problem: METABOLIC, FLUID AND ELECTROLYTES - ADULT  Goal: Electrolytes maintained within normal limits  Description: INTERVENTIONS:  - Monitor labs and assess patient for signs and symptoms of electrolyte imbalances  - Administer electrolyte replacement as ordered  - Monitor response to electrolyte replacements, including repeat lab results as appropriate  - Instruct patient on fluid and nutrition as appropriate  Outcome: Progressing  Goal: Fluid balance maintained  Description: INTERVENTIONS:  - Monitor labs   - Monitor I/O and WT  - Instruct patient on fluid and nutrition as appropriate  - Assess for signs & symptoms of volume excess or deficit  Outcome: Progressing     Problem: RESPIRATORY - ADULT  Goal: Achieves optimal ventilation and oxygenation  Description: INTERVENTIONS:  - Assess for changes in respiratory status  - Assess for changes in mentation and behavior  - Position to facilitate oxygenation and minimize respiratory effort  - Oxygen administered by appropriate delivery if ordered  - Initiate smoking cessation education as indicated  - Encourage broncho-pulmonary hygiene including cough, deep breathe, Incentive Spirometry  - Assess the need for suctioning and aspirate as needed  - Assess and instruct to report SOB or any respiratory difficulty  - Respiratory Therapy support as indicated  Outcome: Progressing     Problem: Nutrition/Hydration-ADULT  Goal: Nutrient/Hydration intake appropriate for improving, restoring or maintaining nutritional needs  Description: Monitor and assess patient's nutrition/hydration status for malnutrition. Collaborate with interdisciplinary team and initiate plan and interventions as ordered. Monitor patient's weight and dietary intake as ordered or per policy. Utilize nutrition screening tool and intervene as necessary. Determine patient's food preferences and provide high-protein, high-caloric foods as appropriate.      INTERVENTIONS:  - Monitor oral intake, urinary output, labs, and treatment plans  - Assess nutrition and hydration status and recommend course of action  - Evaluate amount of meals eaten  - Assist patient with eating if necessary   - Allow adequate time for meals  - Recommend/ encourage appropriate diets, oral nutritional supplements, and vitamin/mineral supplements  - Order, calculate, and assess calorie counts as needed  - Recommend, monitor, and adjust tube feedings and TPN/PPN based on assessed needs  - Assess need for intravenous fluids  - Provide specific nutrition/hydration education as appropriate  - Include patient/family/caregiver in decisions related to nutrition  Outcome: Progressing     Problem: Prexisting or High Potential for Compromised Skin Integrity  Goal: Skin integrity is maintained or improved  Description: INTERVENTIONS:  - Identify patients at risk for skin breakdown  - Assess and monitor skin integrity  - Assess and monitor nutrition and hydration status  - Monitor labs   - Assess for incontinence   - Turn and reposition patient  - Assist with mobility/ambulation  - Relieve pressure over bony prominences  - Avoid friction and shearing  - Provide appropriate hygiene as needed including keeping skin clean and dry  - Evaluate need for skin moisturizer/barrier cream  - Collaborate with interdisciplinary team   - Patient/family teaching  - Consider wound care consult   Outcome: Progressing

## 2023-08-10 NOTE — ASSESSMENT & PLAN NOTE
· Sees nephrology as an outpatient baseline creatinine closer to 2.3  · Takes torsemide 40 mg daily at home, monitor on home dose  · Cr currently at baseline    Results from last 7 days   Lab Units 08/10/23  0607 08/09/23  0542 08/08/23  0443 08/07/23  0604 08/06/23  0505 08/05/23  0502 08/04/23  0510   BUN mg/dL 37* 36* 37* 35* 34* 34* 35*   CREATININE mg/dL 2.05* 2.19* 2.29* 2.19* 2.32* 2.37* 2.26*   EGFR ml/min/1.73sq m 28 26 25 26 24 24 25

## 2023-08-10 NOTE — ASSESSMENT & PLAN NOTE
· Empyema of the lung requiring chest tube placement on 6/24 and removal on 7/7. Received tPA/dornase administration and 7-day course of cefepime during previous admission. · Chest tube placed again on 7/28 by IR due to persistent right hydropneumothorax. Pleural effusion PH 7.8 with no evidence of recurrent empyema.     · Cultures showing no growth   · IR and pulmonary following for chest tube management  · Chest tube removed 8/9  · Okay to leave an indwelling pleural catheter on discharge with recommendations to drain every other day up to 500 mL or more frequently in between if symptomatic

## 2023-08-10 NOTE — ASSESSMENT & PLAN NOTE
Right exudative pleural effusion with history of CKD/CHF on diuretics  · Pulmonary following, appreciate input  · Status post chest tube placement  · Indwelling pleural catheter placed 8//7  · Per pulmonary, ok to remove chest tube 8/9  · Okay to drain indwelling pleural catheter every other day up to 500 mL, or more frequently in between if symptomatic  · Continue home dose diuretics  · Encourage incentive spirometry  · Aspiration precautions

## 2023-08-10 NOTE — CASE MANAGEMENT
Case Management Discharge Planning Note    Patient name Tj [facility]  Location 2 [address] [place] [address] 2042 [address] MRN [id number]  : 1940 Date 8/10/2023       Current Admission Date: 2023  Current Admission Diagnosis:Pleural effusion exudative   Patient Active Problem List    Diagnosis Date Noted   • Pleural effusion exudative 2023   • Sepsis ([address]) 2023   • Goals of care, counseling/discussion 2023   • Duodenal ulcer 07/10/2023   • Encephalopathy 2023   • Recent empyema of lung with persistent locuted R hydropneumothorax 2023   • Hypoglycemia 2023   • Thrombocytopenia ([address]) 2023   • Diarrhea 2023   • Hypothermia 2023   • Pleural effusion, right side 2023   • Septic shock ([address]) 2023   • Urinary retention 2023   • Macrocytosis 2023   • Hyponatremia 2023   • Anemia 2023   • Chronic kidney disease-mineral and bone disorder 2023   • Advanced care planning/counseling discussion 2023   • Acute kidney injury superimposed on chronic kidney disease ([address]) 2022   • Benign hypertension with CKD (chronic kidney disease) stage IV ([address]) 2022   • Persistent proteinuria 2022   • COVID-19 2022   • Electrolyte abnormality 2022   • Cellulitis of left upper extremity 2021   • Atrial fibrillation with slow ventricular response ([address]) 2021   • Vitamin D deficiency 10/18/2019   • Chronic combined systolic and diastolic congestive heart failure ([address]) 2019   • Recent pericardial effusion 2019   • Leg edema 01/10/2019   • 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 ([address]) 01/10/2019   • PVC (premature ventricular contraction) 2018   • Essential hypertension 2018   • Closed traumatic displaced fracture of distal end of left radius with malunion 2018      LOS (days): 14  Geometric Mean LOS (GMLOS) (days): 5.00  Days to GMLOS:-8.6 OBJECTIVE:  Risk of Unplanned Readmission Score: 40.32      Current admission status: Inpatient   Preferred Pharmacy:   [facility] Francheska Carpio, 1 [facility] [address] [facility] [address] [place] [address] [address]  73[address] [facility] [address]  Phone: [phone] Fax: [phone]    Primary Care Provider: Lyn Pierre DO    Primary Insurance: MEDICARE  Secondary Insurance: BLUE CROSS    DISCHARGE DETAILS:    Discharge planning discussed with[de-identified] Son Lester Sever contacted family/caregiver?: Yes  Were Treatment Team discharge recommendations reviewed with patient/caregiver?: Yes  Did patient/caregiver verbalize understanding of patient care needs?: N/A- going to facility  Were patient/caregiver advised of the risks associated with not following Treatment Team discharge recommendations?: Yes    Contacts  Patient Contacts: Jessica Daniel  Relationship to Patient[de-identified] Family  Contact Method: Phone  Phone Number: [phone]  Reason/Outcome: Emergency Contact, Discharge Planning, Continuity of Care    Other Referral/Resources/Interventions Provided:  Interventions: Short Term Rehab, Transportation  Referral Comments: CCL notified via [employer]idin of confirmed pickup time for 1145. Treatment Team Recommendation: Facility Return, Short Term Rehab  Discharge Destination Plan[de-identified] Short Term Rehab, Facility Return  Transport at Discharge : [facility]     Number/Name of Dispatcher: [facility]  Transported by [employer] and Unit #): [facility]  ETA of Transport (Date): 08/10/23  ETA of Transport (Time): 1145     IMM Given (Date):: 08/10/23  IMM Given to[de-identified] Family  IMM reviewed with patient's caregiver, patient's caregiver agrees with discharge determination.     Accepting Facility Name, [address] [place] [address] : [facility]  Receiving Facility/Agency Phone Number: [phone]

## 2023-08-15 LAB
MYCOBACTERIUM SPEC CULT: NORMAL
RHODAMINE-AURAMINE STN SPEC: NORMAL

## 2023-08-31 NOTE — ASSESSMENT & PLAN NOTE
Patient has history of paroxysmal atrial fibrillation  Continue Coreg 6 25 milligram p o  B i d   And anticoagulation with Eliquis operating room

## 2023-09-06 ENCOUNTER — OFFICE VISIT (OUTPATIENT)
Dept: AUDIOLOGY | Facility: CLINIC | Age: 83
End: 2023-09-06

## 2023-09-06 DIAGNOSIS — H90.3 SENSORINEURAL HEARING LOSS (SNHL), BILATERAL: Primary | ICD-10-CM

## 2023-09-06 PROBLEM — J18.9 PNEUMONIA: Status: RESOLVED | Noted: 2023-06-24 | Resolved: 2023-09-06

## 2023-09-07 ENCOUNTER — TELEPHONE (OUTPATIENT)
Dept: PULMONOLOGY | Facility: CLINIC | Age: 83
End: 2023-09-07

## 2023-09-07 NOTE — TELEPHONE ENCOUNTER
Diomedes Watts from [facility] called to advise Dr. Renée Mcbride that the patient is not going to hospice but they are being discharged next week.

## 2023-09-11 ENCOUNTER — TELEPHONE (OUTPATIENT)
Dept: PULMONOLOGY | Facility: MEDICAL CENTER | Age: 83
End: 2023-09-11

## 2023-09-11 DIAGNOSIS — J90 PLEURAL EFFUSION: Primary | ICD-10-CM

## 2023-09-11 NOTE — TELEPHONE ENCOUNTER
I left a message for Ankit Jax to see if they were currently draining the catheter and how much was being removed. If he is not going on hospice it may be beneficial to get a new x-ray and see him in the office. If the tube is not putting much out he may not need it. I am not sure from the [place] office which [employer] company would provide the supplies. Are there any open appointments this week?

## 2023-09-11 NOTE — TELEPHONE ENCOUNTER
Dr. Kellogg/Dr. Kayy Reynoso -I spoke with Alok Bullard at [facility] and the tube is putting out  every 3 days. I am not sure if him no longer being on hospice will change the management of the tube? They will do an xray there.

## 2023-09-11 NOTE — TELEPHONE ENCOUNTER
I spoke with patient's son Arely Perdomo today. Chari Gilbert is still at [facility] and likely will be discharged later this week. He is being trained on how to aspirate fluid from the intrapleural catheter states that it is being done 3 times a week now and maybe an inch of fluid is aspirated into the bottle. He will call me later in week once he knows discharge date and we will arrange for follow-up visit and instructions at that point.   If he needs more bottles we will give him the telephone number from the specialty pharmacy he can order these from [address] [place] [address] in [place], [place]    Phone [phone]  Fax  [phone]

## 2023-09-14 ENCOUNTER — OFFICE VISIT (OUTPATIENT)
Dept: AUDIOLOGY | Facility: CLINIC | Age: 83
End: 2023-09-14
Payer: COMMERCIAL

## 2023-09-14 DIAGNOSIS — H90.3 SENSORINEURAL HEARING LOSS (SNHL), BILATERAL: Primary | ICD-10-CM

## 2023-09-14 PROCEDURE — V5160 DISPENSING FEE BINAURAL: HCPCS | Performed by: AUDIOLOGIST

## 2023-09-14 PROCEDURE — V5261 HEARING AID, DIGIT, BIN, BTE: HCPCS | Performed by: AUDIOLOGIST

## 2023-09-16 NOTE — PROGRESS NOTES
[facility] Hearing Aid Fitting    Eugenia Weston was seen today (9/14/2023) for a binaural hearing aid fitting of his [address]   in the canal ([place]) hearing aid(s). Mr. Yahaira Clemons was accompanied by his son  to today's visit. Mr. Yahaira Clemons reported to be fit with hearing aids today after one of his hearing aids was lost. One half of today's balance is to be paid by [facility] cost center. Device Information    Hearing Aid Fitting Date: 9/14/2023       Left Device Right Device   Hearing Aid Make: Charlene Harley   Hearing Aid Model: Real 2 miniRITE R Real 2 miniRITE R   Serial Number: [id number]   Repair Warranty Expiration Date: 10/11/26 10/11/26   Loss/Damage Warranty Expiration Date:  10/11/26 10/11/26    Length: 2 2    Output: 85 85   Wax System: Pro Wax miniFIT Pro Wax miniFIT   Dome Size/Style: 8mm double bass 8mm double bass   Battery: Lithium-ion Rechargeable Lithium-ion Rechargeable   Earmold Serial Number: N/A N/A   Minh Marina Date:  N/A N/A      Serial Number: [id number]  Accessories: N/A      Initial Hearing Aid Settings    Hearing aid(s) were programmed using [employer] software's VAC+ fitting formula and was validated by Mr. Tipton for perceived comfort levels. • Manual control button was deactivated  • Hearing aids set to experience level 3 per Mr. Tipton's subjective listening preference    Device Orientation    Mr. Tipton was counseled on device components and component function. Proper insertion and removal of the aid(s) was demonstrated. The importance of realistic expectations with expectation, especially in the presence of background noise, was emphasized. Hearing aids are assistive devices and are not designed to restore normal hearing sensitivity.  The need for daily consistent usage (8-12 hours per day) for proper device acclimatization, as well as the importance of self-advocacy and practicing effective communication strategies was outlined. Effective communication strategies include:  1.) Maintaining face-to-face communication, allowing for speechreading of facial expressions, lips, and gestures. 2.) Reducing background noise and distance between communication partners. 3.) Having communication partners reduce their rate of speech when appropriate. 4.) Beginning conversation by getting communication partner's attention and stating the topic, allowing for context clues to help fill in gaps in comprehension. 5.) Asking for rephrasing of missed aspects of conversation rather than asking for repetitions. Mr. Foster was given the information booklet (or QR code pamphlet to the booklet) regarding hearing aid usage and operation, hearing aid cleaning tools, and hearing aid carrying case. Hearing aid repair and loss and damage warranties offered through the  were outlined thoroughly. Mr. Foster agreed to the terms of sale listed on the purchase agreement containing device specifications, warranties, pricing information, as well as [facility] 45-day trial period timeline. After this period has elapsed, hearing aids cannot be returned. Recommendations & Follow-up    Mr. Tipton demonstrated understanding of the topics discussed. It appears that he has realistic expectations regarding the benefits and limitations with the use of amplification. The prognosis for successful hearing aid use is good. While improvement with amplification is expected, there will always be word understanding problems due to the inherent nature of hearing loss. These problems will be greater in background noise and adverse listening environments than in quiet situations. Mr. Foster is to follow-up in 2-3 weeks for a hearing aid check within the trial period as scheduled.      At this time of Mr. Aaron Duque next visit, the following topics should be reviewed: wax guard removal/replacement, increasing adaptation/gain settings after initial acclimatization, activation of push button/toggle switches for manual volume control, as well as noise reductive features in [employer]. The above recommendations were discussed with Mr. Michelle Pisano. It was a pleasure working with Mr. Michelle Pisano today. He was encouraged to contact the clinic with any further questions/concerns in the meantime. Rodney Jett.   Clinical Audiologist    [address] [address]

## 2023-09-16 NOTE — PROGRESS NOTES
HEARING AID EVALUTION - [facility] AUDIOLOGY    Patient Name: Dave Maynard   MRN:  [id number]   :  1940   Age: 78 y.o. Gender: male   DOS: 2023       As part of service recovery effort to replace the patient's lost hearing aids, strengths and limitations of amplification, including various hearing aid styles, options, and circuitry were discussed at length with Mr. Foster. Based on the degree of his hearing loss, preferences, and lifestyle needs, a trial with 51wan Real 2 miniRITE Rs was recommended with size 2 receivers and 8mm double sullivan domes. Mr. Foster preferred the chroma beige form factor color. [facility] office policies regarding our hearing aid program were reviewed, including the 45 day trial period, non-refundable fees, as well as the  warranties. At this time, Mr. Foster wished to proceed with purchase of the above hearing aids. Devices ordered as specified. He will return for a hearing aid fitting in 1-2 wks. It was a pleasure working with Mr. Foster. They were encouraged to contact the clinic with any further questions or concerns in the meantime. Rodney Araujo.   Clinical Audiologist    [address] [address]

## 2023-09-16 NOTE — PROGRESS NOTES
ADULT HEARING EVALUATION - [address] AUDIOLOGY      Patient Name: Celestine Villarreal   MRN:  [id number]   :  1940   Age: 78 y.o. Gender: male   DOS: 2023     HISTORY:     Celestine Villarreal, a 78 y.o. male, was seen on 2023 at the referral of Dr. Emily Morgan for an audiometric evaluation. Mr. Lelia Keene was accompanied today by his son. Mr. Lelia Keene is a new patient to our practice. He is being seen as part of a service recovery effort for lost hearing aids while admitted to [facility]. Today, Mr. Parr Else primary complaint(s) was bilateral difficulty heraing. Onset of symptoms reportedly began gradually over time. Mr. Lelia Keene denied otalgia, otorrhea, aural fullness, tinnitus and dizziness. History of otitis was negative. Mr. Lelia Keene has no history of ear surgeries. Family history of hearing loss was negative. History of noise exposure is positive. Other documented medical history states that Mr. Lelia Keene  has a past medical history of Atrial fibrillation ([address] [facility]), Chronic kidney disease, Coronary artery disease, Diabetes mellitus ([address] [facility]), Hyperlipidemia, and Hypertension. Mr. Lelia Keene has had his hearing tested previously. RESULTS:    Otoscopic Evaluation:   Right Ear: Unremarkable, canal clear   Left Ear: Unremarkable, canal clear    Tympanometry:   Right Ear: Type A; normal middle ear pressure and static compliance    Left Ear: Type As, normal middle ear pressure with decreased static compliance, consistent with a hypomobile tympanic membrane.      Audiometry:  Conventional pure tone audiometry from 250 - 8000 Hz  obtained with good reliability and revealed the following:     Right Ear: mild to severe sensorineural hearing loss (SNHL)   Left Ear: moderate to severe sensorineural hearing loss (SNHL)     Speech Audiometry:    Speech Reception (SRT)   Right Ear: 50 dB HL   Left Ear: 55 dB HL    Word Recognition Scores (WRS):  Right Ear: excellent (90 % correct)     Left Ear: good (84 % correct)   Stimuli: [id number]    IMPRESSIONS:  Bilateral SNHL AU    The results of today's findings were reviewed with Mr. Sonia Carrillo and his hearing thresholds were explained at length. Treatment options, including amplification and communication strategies, were discussed as appropriate. Mr. Sonia Carrillo voiced understanding of his test results and had no further questions. RECOMMENDATIONS:    1.) Follow-up with referring provider. 2.) Based on today's findings and patient symptoms, Mr. Sonia Carrillo is a candidate for a trial with amplification. A hearing aid evaluation to further discuss Mr. Tipton's lifestyle, communication needs, and develop a treatment plan for their hearing loss is recommended. *see attached audiogram*    It was a pleasure working with Mr. Sonia Carrillo today. Thank you for referring this patient. Rodney Blankenship.   Clinical Audiologist    [address] [address]

## 2023-09-28 ENCOUNTER — OFFICE VISIT (OUTPATIENT)
Dept: AUDIOLOGY | Facility: CLINIC | Age: 83
End: 2023-09-28

## 2023-09-28 DIAGNOSIS — H90.3 SENSORINEURAL HEARING LOSS (SNHL), BILATERAL: Primary | ICD-10-CM

## 2023-09-30 PROBLEM — A41.9 SEPSIS (HCC): Status: RESOLVED | Noted: 2023-07-29 | Resolved: 2023-09-30

## 2023-10-01 NOTE — PROGRESS NOTES
[facility] AUDIOLOGY HEARING AID CHECK    Belinda Oneill was seen today (9/28/2023) for a hearing aid check of his bilateral hearing aids. Today, Mr. Sondra Calderón reported that he was doing well overall with the hearing aids, however, his son noted that he was having difficulty today and questioned if they were fit properly. Otoscopy revealed Unremarkable, canal clear. Device Information    Hearing Aid Fitting Date: 9/28/2023       Left Device Right Device   Hearing Aid Make: Zehra Rodriguez   Hearing Aid Model: Real 2 miniRITE R Real 2 miniRITE R   Serial Number: [id number]   Repair Warranty Expiration Date: 10/11/26 10/11/26   Loss/Damage Warranty Expiration Date:  10/11/26 10/11/26    Length: 2 2    Output: 85 85   Wax System: Pro Wax miniFIT Pro Wax miniFIT   Dome Size/Style: 8mm double bass 8mm double bass   Battery: Lithium-ion Rechargeable Lithium-ion Rechargeable   Earmold Serial Number: N/A N/A   Subhash Daniels Date:  N/A N/A      Serial Number: [id number]  Accessories: N/A      Visual inspection of the device(s) revealed that both hearing aids were dead and had 0% battery life. A listening check revealed good sound quality from the device(s) after brief charge. Changes to Device(s)    • None    Recommendations & Follow-up    Hearing aids(s) are in good working condition. However, both aids were brought dead today. Charging behavior was outlined at length. Hearing aid batteries and  seem to be in good working order. Datalogging revealed ~8h/day of usage. They will RTC in approx 2 weeks for REM. It was a pleasure working with Mr. Sondra Calderón today. He was encouraged to contact the clinic with any further questions in the meantime. Rodney Tracy.   Clinical Audiologist    [place]  [address] [place] [phone]

## 2023-10-04 ENCOUNTER — OFFICE VISIT (OUTPATIENT)
Dept: AUDIOLOGY | Facility: CLINIC | Age: 83
End: 2023-10-04

## 2023-10-04 DIAGNOSIS — H90.3 SENSORINEURAL HEARING LOSS (SNHL), BILATERAL: Primary | ICD-10-CM

## 2023-10-04 NOTE — PROGRESS NOTES
[facility] AUDIOLOGY HEARING AID CHECK    Yared Ojeda was seen today (10/4/2023) for a hearing aid check of his bilateral hearing aids. Today, Mr. Mary Mock reported to have REM performed. There were no issues identified. Otoscopy revealed Unremarkable, canal clear. Device Information    Hearing Aid Fitting Date: 10/4/2023       Left Device Right Device   Hearing Aid Make: Kody Bloom   Hearing Aid Model: Real 2 miniRITE R Real 2 miniRITE R   Serial Number: [id number]   Repair Warranty Expiration Date: 10/11/26 10/11/26   Loss/Damage Warranty Expiration Date:  10/11/26 10/11/26    Length: 2 2    Output: 85 85   Wax System: Pro Wax miniFIT Pro Wax miniFIT   Dome Size/Style: 8mm double bass 8mm double bass   Battery: Lithium-ion Rechargeable Lithium-ion Rechargeable   Earmold Serial Number: N/A N/A   Bao Tae Date:  N/A N/A      Serial Number: [id number]  Accessories: N/A      Visual inspection of the device(s) revealed no noticeable damage or defects    A listening check revealed good sound quality from the device(s)    Changes to Device(s)    REM performed. Devices adjusted to NAL-NL2 targets for 55, 65, 75 dB SPL and validated by  Saeed for subjective listening comfort. Recommendations & Follow-up    Hearing aids(s) are in good working condition. They will RTC in 3-6 mo., sooner if problems/concerns arise. It was a pleasure working with Mr. Mary Mock today. He was encouraged to contact the clinic with any further questions in the meantime. Rodney Glover.   Clinical Audiologist    [address] [address]

## 2023-10-05 ENCOUNTER — TELEPHONE (OUTPATIENT)
Dept: PULMONOLOGY | Facility: CLINIC | Age: 83
End: 2023-10-05

## 2023-10-05 DIAGNOSIS — J90 PLEURAL EFFUSION EXUDATIVE: Primary | ICD-10-CM

## 2023-10-05 NOTE — TELEPHONE ENCOUNTER
Patient has never used [facility] pharmacy prior, I spoke to the rep, Nicho we would need the paperwork filled out and takes about 3-5 days to get to the patient.

## 2023-10-05 NOTE — TELEPHONE ENCOUNTER
Whitney Santiago can you please reach out to patient schedule follow up in office and inform to have cxr done that has been ordered. Please let me know if you need anything from me.

## 2023-10-05 NOTE — TELEPHONE ENCOUNTER
Paperwork signed and returned to [facility]. Patient needs follow-up in the office with a physician and a CXR. Order placed for the CXR. He was previously going to be hospice, but now is not, so he needs repeat imaging and follow-up for management. Thank you.

## 2023-10-05 NOTE — TELEPHONE ENCOUNTER
Patient was seen in the hospital and they are being discharged on Monday. The nurse Mohsen Jaramillo is called to ask if we can order the Pleurax drainage kit or write a script for it and where does the family go to pick this up? Please advise by calling Lele Ibarra the Nurse over at [facility] Please have her paged overhead. Thank you.

## 2023-10-06 NOTE — TELEPHONE ENCOUNTER
Yessi Mejía from [facility] called she is going to get the asept supplies out today for the patients.

## 2023-10-24 ENCOUNTER — DOCUMENTATION (OUTPATIENT)
Dept: NEPHROLOGY | Facility: CLINIC | Age: 83
End: 2023-10-24

## 2023-10-24 LAB
EXT GLUCOSE BLD: 311
EXTERNAL ALBUMIN: 3.9
EXTERNAL ALK PHOS: 216
EXTERNAL ALT: 12
EXTERNAL AST: 13
EXTERNAL BUN: 60
EXTERNAL CALCIUM: 8.2
EXTERNAL CHLORIDE: 94
EXTERNAL CO2: 25
EXTERNAL CREATININE: 1.76
EXTERNAL EGFR: 38
EXTERNAL PHOSPHORUS: 3.3
EXTERNAL POTASSIUM: 4.4
EXTERNAL PTH: 105
EXTERNAL SODIUM: 135
EXTERNAL T.BILIRUBIN: 0.4
EXTERNAL TOTAL PROTEIN: 6.2
HCT VFR BLD AUTO: 30.1 % (ref 36.5–49.3)
HGB BLD-MCNC: 9.3 G/DL (ref 12–17)
PLATELET # BLD AUTO: 171 THOUSANDS/UL (ref 149–390)
WBC # BLD AUTO: 5.9 THOUSAND/UL

## 2023-10-25 ENCOUNTER — OFFICE VISIT (OUTPATIENT)
Dept: PULMONOLOGY | Facility: MEDICAL CENTER | Age: 83
End: 2023-10-25
Payer: MEDICARE

## 2023-10-25 ENCOUNTER — TELEPHONE (OUTPATIENT)
Dept: PULMONOLOGY | Facility: CLINIC | Age: 83
End: 2023-10-25

## 2023-10-25 VITALS
SYSTOLIC BLOOD PRESSURE: 124 MMHG | OXYGEN SATURATION: 97 % | HEIGHT: 69 IN | RESPIRATION RATE: 12 BRPM | BODY MASS INDEX: 23.93 KG/M2 | DIASTOLIC BLOOD PRESSURE: 68 MMHG | WEIGHT: 161.6 LBS | HEART RATE: 74 BPM

## 2023-10-25 DIAGNOSIS — J90 PLEURAL EFFUSION EXUDATIVE: Primary | ICD-10-CM

## 2023-10-25 PROCEDURE — 99214 OFFICE O/P EST MOD 30 MIN: CPT | Performed by: STUDENT IN AN ORGANIZED HEALTH CARE EDUCATION/TRAINING PROGRAM

## 2023-10-25 NOTE — PROGRESS NOTES
Pulmonary Outpatient Consultation Note   Zoila Khan 78 y.o. male MRN: [id number]  10/25/2023    Referring provider:   Jacquie Clifford Do  [address]  [facility]  THE [facility],  [address]     Reason for Consultation: [facility] discharge follow-up    No problem-specific Assessment & Plan notes found for this encounter. Assessment:    Right pleural effusion exudative in nature, was characterized as hydropneumothorax initially treated with chest tube over the summer, he continued to have drainage therefore a indwelling pleural catheter was placed. After that he went to a care facility where they drained him every few days and he had decent amount of drainage although the son was unable to characterize how much. He is now here for follow-up and to establish care. This happened in August and he is now here. Over the last week the son has started draining him at home and he is draining less than 100 each time. He does have diminished breath sound at the right base, there is no updated imaging since the hospitalizations was difficult to say how much fluid is truly in there. There is no cytology from the pleural effusion. The LDH and protein are low. It is 88% lymphocytic in nature. There was no formal diagnosis aside from exudative pleural effusion with possible hydropneumothorax. He has several drainage bottles at home. We discussed how frequently to drain, given his low amount of output, I think it is reasonable to start doing weekly drainage only, order was placed for visiting nursing service to do that. We should update a chest x-ray to make sure there is no other causes behind this.   Atrial fibrillation on Eliquis  Heart failure with preserved action fraction      Plan:    Obtain 2 view chest x-ray to evaluate right pleural effusion  Start draining pleural effusion once a week given the limited amount of fluid that has been coming out  Visiting nursing service order placed for weekly drainage  Follow-up in 1 month to decide if this needs to come out. If it does not come out, he will need a referral to interventional radiology at [facility] for removal as a place that      History of Present Illness   HPI:    66-year-old gentleman with past medical history of indwelling pleural catheter for complicated parapneumonic effusion initially was a loculated hydropneumothorax and then chest tube placed followed by indwelling pleural catheter for continued drainage, also past medical history of type 2 diabetes, hypertension, pericardial effusion status postdrainage, A-fib. Apparently patient was hospitalized in August when he had the indwelling pleural catheter placed and went to a facility on hospice, then there is documentation where the patient changes his mind and was no longer on hospice. This was followed up by documentation from pulmonary clinic that further indwelling pleural catheter supplies were needed. Patient now here for follow-up with his son. He has since been discharged to care facility, is no longer on hospice. He clinically is improving. The son is now draining him at home and every few days he is getting 100 mL now getting scant amount of fluid. He says it is free-flowing, occasionally foamy. No blood no pain with evacuation. No PFT on file  Echocardiogram July 2023 biatrial dilation, A-fib no pericardial effusion    CT chest June 2023 small right pleural effusion with pericardial effusion, anasarca, no mediastinal lymphadenopathy    Review of Systems   Constitutional:  Positive for fatigue. HENT: Negative. Eyes: Negative. Respiratory:  Positive for shortness of breath. Cardiovascular: Negative. Gastrointestinal: Negative. Endocrine: Negative. Genitourinary: Negative. Musculoskeletal: Negative. Skin: Negative. Allergic/Immunologic: Negative. Neurological: Negative. Hematological: Negative. Psychiatric/Behavioral: Negative. Historical Information   Past Medical History:   Diagnosis Date   • Atrial fibrillation [facility])    • Chronic kidney disease    • Coronary artery disease    • Diabetes mellitus ([address] [facility])    • Hyperlipidemia    • Hypertension      Past Surgical History:   Procedure Laterality Date   • CARDIAC CATHETERIZATION N/A 6/30/2023    Procedure: Cardiac pericardiocentesis; Surgeon: Katie Walters DO;  Location: [facility] CARD[facility]AC [facility]; Service: Cardiology   • CARDIAC CATHETERIZATION N/A 6/30/2023    Procedure: Cardiac RHC; Surgeon: Katie Walters DO;  Location: [facility];   Service: Cardiology   • EYE SURGERY     • IR CHEST TUBE PLACEMENT  6/24/2023   • IR CHEST TUBE PLACEMENT  7/28/2023   • IR PLEURAL EFFUSION LONG-TERM CATHETER PLACEMENT  8/7/2023   • SKIN CANCER EXCISION     • TONSILLECTOMY       Family History   Problem Relation Age of Onset   • Heart disease Mother    • Diabetes Father    • Vision loss Father    • Cancer Sister    • Diabetes Sister        Occupational History: retired     Social History     Tobacco Use   Smoking Status Former   Smokeless Tobacco Former       Meds/Allergies     Current Outpatient Medications:   •  allopurinol (ZYLOPRIM) 100 mg tablet, Take 100 mg by mouth 2 (two) times a day, Disp: , Rfl:   •  ALPRAZolam (XANAX) 0.5 mg tablet, 0.5 mg daily at bedtime , Disp: , Rfl: 0  •  ascorbic acid (VITAMIN C) 500 MG tablet, Take 1 tablet (500 mg total) by mouth daily, Disp: , Rfl: 0  •  atorvastatin (LIPITOR) 40 mg tablet, Take 1 tablet (40 mg total) by mouth daily with dinner, Disp: 30 tablet, Rfl: 0  •  Blood Pressure Monitor NILTON, by Does not apply route daily, Disp: 1 Device, Rfl: 0  •  Eliquis 2.5 MG, TAKE ONE TABLET BY MOUTH TWICE A DAY, Disp: 60 tablet, Rfl: 11  •  glimepiride (AMARYL) 2 mg tablet, Take 1 tablet (2 mg total) by mouth daily with dinner, Disp: 30 tablet, Rfl: 0  •  glimepiride (AMARYL) 4 mg tablet, Take 1 tablet (4 mg total) by mouth daily with breakfast, Disp: , Rfl: 0  •  insulin glargine (LANTUS) 100 units/mL subcutaneous injection, Inject 5 Units under the skin daily at bedtime, Disp: 10 mL, Rfl: 0  •  latanoprost (XALATAN) 0.005 % ophthalmic solution, 1 drop daily at bedtime, Disp: , Rfl:   •  sitaGLIPtin (JANUVIA) 25 mg tablet, Take 1 tablet (25 mg total) by mouth daily Do not start before February 28, 2023., Disp: 30 tablet, Rfl: 0  •  tamsulosin (FLOMAX) 0.4 mg, Take 0.4 mg by mouth daily with dinner, Disp: , Rfl:   •  timolol (TIMOPTIC) 0.5 % ophthalmic solution, Administer 1 drop to both eyes daily, Disp: , Rfl:   •  ZINC OXIDE PO, Take by mouth daily, Disp: , Rfl:   •  cholecalciferol (VITAMIN D3) 1,000 units tablet, Take 2 tablets (2,000 Units total) by mouth daily Do not start before July 12, 2023., Disp: 60 tablet, Rfl: 0  •  docusate sodium (COLACE) 100 mg capsule, Take 1 capsule (100 mg total) by mouth 2 (two) times a day as needed for constipation (Patient not taking: Reported on 10/25/2023), Disp: , Rfl: 0  •  pantoprazole (PROTONIX) 40 mg tablet, Take 1 tablet (40 mg total) by mouth daily, Disp: 30 tablet, Rfl: 0  •  torsemide 40 MG TABS, Take 40 mg by mouth daily Do not start before July 12, 2023., Disp: 30 tablet, Rfl: 0  Allergies   Allergen Reactions   • Elemental Sulfur    • Sulfa Antibiotics        Vitals: Blood pressure 124/68, pulse 74, resp. rate 12, height 5' 9" (1.753 m), weight 73.3 kg (161 lb 9.6 oz), SpO2 97 %. , Body mass index is 23.86 kg/m².  Oxygen Therapy  SpO2: 97 %    Physical Exam:    GEN: alert and oriented x 3, pleasant and cooperative   HEENT:  Normocephalic, atraumatic, anicteric  NECK: No JVD or carotid bruits   HEART: Rate, normal S1 and S2  LUNGS: Diminished breath sound at the right side, dullness to percussion, left side clear  ABDOMEN:  Normoactive bowel sounds, soft, no tenderness, no distention  EXTREMITIES: peripheral pulses palpable; no edema  NEURO: no gross focal findings; cranial nerves grossly intact   SKIN:  Dry, intact, warm to touch    Labs: I have personally reviewed pertinent lab results. No results found for: "IGE"    Imaging and other studies: I have personally reviewed pertinent films in PACS    Pulmonary function testing:  Personally interpreted by me    Other Studies: I have personally reviewed pertinent reports.       Rivera West MD  Pulmonary and Critical Care Medicine

## 2023-10-25 NOTE — PATIENT INSTRUCTIONS
It was a pleasure seeing you today!     Obtain CXR  Orders placed to have visiting nurse start doing drainage weekly   Follow up in 1 month     Jhonny Herrera MD  Pulmonary and [address]

## 2023-10-25 NOTE — TELEPHONE ENCOUNTER
[facility] called in regards to the patient. She asked if someone from the office could call them back because there are some questions regarding the Home Health order that Dr. Urmila Burrell sent in. Please advise.      [phone]

## 2023-10-26 NOTE — TELEPHONE ENCOUNTER
Dr Clara Del Castillo spoke with Latrell HAY and she states she is having some trouble transferring patient [id number] to you as it is showing you are not [facility] enrolled or updated. On her end you are coming out as a Student and it will not allow them to transfer. If you can please contact Latrell Jeronimo at [phone]. This needs to be done asap.

## 2023-10-27 ENCOUNTER — OFFICE VISIT (OUTPATIENT)
Dept: NEPHROLOGY | Facility: CLINIC | Age: 83
End: 2023-10-27
Payer: MEDICARE

## 2023-10-27 VITALS
HEART RATE: 60 BPM | BODY MASS INDEX: 23.85 KG/M2 | HEIGHT: 69 IN | DIASTOLIC BLOOD PRESSURE: 78 MMHG | WEIGHT: 161 LBS | OXYGEN SATURATION: 100 % | SYSTOLIC BLOOD PRESSURE: 124 MMHG

## 2023-10-27 DIAGNOSIS — N18.9 CHRONIC KIDNEY DISEASE-MINERAL AND BONE DISORDER: ICD-10-CM

## 2023-10-27 DIAGNOSIS — N18.4 TYPE 2 DIABETES MELLITUS WITH STAGE 4 CHRONIC KIDNEY DISEASE AND HYPERTENSION (HCC): Primary | Chronic | ICD-10-CM

## 2023-10-27 DIAGNOSIS — E11.22 TYPE 2 DIABETES MELLITUS WITH STAGE 4 CHRONIC KIDNEY DISEASE AND HYPERTENSION (HCC): Primary | Chronic | ICD-10-CM

## 2023-10-27 DIAGNOSIS — E83.9 CHRONIC KIDNEY DISEASE-MINERAL AND BONE DISORDER: ICD-10-CM

## 2023-10-27 DIAGNOSIS — I12.9 TYPE 2 DIABETES MELLITUS WITH STAGE 4 CHRONIC KIDNEY DISEASE AND HYPERTENSION (HCC): Primary | Chronic | ICD-10-CM

## 2023-10-27 DIAGNOSIS — M89.9 CHRONIC KIDNEY DISEASE-MINERAL AND BONE DISORDER: ICD-10-CM

## 2023-10-27 DIAGNOSIS — I50.42 CHRONIC COMBINED SYSTOLIC AND DIASTOLIC CONGESTIVE HEART FAILURE (HCC): Chronic | ICD-10-CM

## 2023-10-27 PROBLEM — N17.9 ACUTE KIDNEY INJURY SUPERIMPOSED ON CHRONIC KIDNEY DISEASE: Chronic | Status: RESOLVED | Noted: 2022-11-07 | Resolved: 2023-10-27

## 2023-10-27 PROCEDURE — 99214 OFFICE O/P EST MOD 30 MIN: CPT | Performed by: INTERNAL MEDICINE

## 2023-10-27 RX ORDER — FERROUS SULFATE 325(65) MG
325 TABLET ORAL
COMMUNITY

## 2023-10-27 NOTE — PROGRESS NOTES
NEPHROLOGY OFFICE VISIT   Emmanuel Marcelino 78 y.o. male MRN: [id number]  10/27/2023    Reason for Visit: Chronic kidney disease    History of Present Illness (HPI):    I had the pleasure of seeing Merced Simmons complained by his son today in the renal clinic for the continued management of chronic kidney disease. Since our last visit, there has been no ER visits or hospitilizations. He currently has no complaints at this time and is feeling well. Patient denies any chest pain, shortness of breath and swelling. The last blood work was done on October 24, which we have reviewed together. Completed rehab and is now discharged back to home and will continue physical therapy at home. Increased swelling and increased weight gain recently. Just recently increased torsemide to 80 mg daily from 60 mg. Last blood work was from October 24 creatinine was below baseline at 1.76 mg/dL likely reflection of volume overload. Roman about renal placement therapy. At this time no firm decision it all depends on what his clinical status at that time when he needs renal placement therapy as per the family. ASSESSMENT and PLAN:    1.) Chronic kidney disease stage IV  -presumed etiology is most likely diabetic nephropathy, with possible component of hypertensive nephrosclerosis and arterial sclerosis. May even have a component of renal vascular disease  -baseline creatinine 2.5-3 mg/dL, function currently below baseline at a creatinine of 1.7 mg/dL likely reflection of volume overload and loss of muscle mass  -Dialysis modalities in the past have been discussed at length  -Chronic Kidney Disease education/Kidney Smart:  Attended  -no urgent indication for dialysis at this time  -avoid NSAIDs  -No recent renal imaging and no clinical indication to repeat at this time  -diabetic and blood pressure control  -No urgent indication for renal placement therapy.   No firm decision on renal placement therapy at this time.  -Agree with increase torsemide to 80 mg  -Follow-up in 3 to 4 months     2.) Chronic systolic congestive heart failure  -ejection fraction 40%  -follows with Cardiology- Dr. Alex Harley  -premature ventricular contraction, pericardial effusion, was moderate on the last echocardiogram  -EDW around high 140s lbs, about 20 pounds above his dry weight  -Torsemide increase to 80 mg daily  -daily weights  -low 2 g sodium diet     3.) Hypertension  -blood pressure at goal in the office  -aim for less than 130/80  -Torsemide 80 mg daily, no longer on carvedilol  -low 2 g sodium diet  -blood pressure at target     4.)  Diabetes mellitus type 2  -hemoglobin A1c: 7.1 (A1C values may be falsely elevated or decreased in those with CKD, assay method should be certified by [employer]). Target A1C < 7  -current medications: Glimepiride and sitagliptin  -proteinuria:  Yes, screen for microalbumin/creatinine ratio  -retinopathy:  No  -neuropathy:  No  -please follow with an ophthalmologist and podiatrist every year  -continued self monitoring of blood glucose at home  -Treatment Management in CKD Recommendations:   Metformin:  Avoid if creatinine clearance is less than 30 cc/min (concern for lactic acidosis)  Sodium-Glucose Cotransporter-2 (SGLT2) Inhibitors: May see an acute drop in the eGFR initially when starting the medication but then a stabilization of the renal function, with slower loss of renal function as compared to placebo. Relative risk of end-stage renal disease, doubling of serum creatinine or death from renal causes were also found to be lower as compared to placebo. ([facility]). Would recommend starting at 100 mg daily, I would avoid use in patients with eGFR < 30 cc/min.   If no contraindications exist I would recommend this medication added to the diabetic regiment  Sulfonylureas:  Preferred short-acting (glipizide, glimepiride, repaglinide), relatively safe in patients with non dialysis CKD  Thiazolidinedones/Alpha-Gluosidase inhibitors/Dipeptidyl peptidase-4 inhibitors:  Generally not considered first-line agents in CKD (limited data in long-term safety and efficacy)  Insulin:  Starting dose may need to be lower than what would ordinarily be used, as there is a decrease metabolism of insulin (no dose adjustment if the GFR > 50 mL/min, dose should be reduced to 75% of baseline if GFR 10-50 mL/min, and 50% baseline if GFR < 10 mL/min)     5.)  Urinary retention  -- No urinary symptoms at this time voiding well     6.) Hyponatremia--resolved  --Sodium normal with a serum glucose level of 311  -- Serum osmolality elevated at the last check 306 with a urine osmolality of 441 and a urine sodium of 20  -- Initial cortisol level was low but the cosyntropin stimulation test was negative  -- Serum protein electrophoresis showed no monoclonal bands  -- Fluid restriction and continue loop diuretic         PATIENT INSTRUCTIONS:    Patient Instructions   1.)  Low 2 g sodium diet    2.)  Monitor weights at home    3.)  Avoid NSAIDs (ibuprofen, Motrin, Advil, Aleve, naproxen)    4.)  Monitor blood pressure at home, call if blood pressure greater than 150/90 persistently    5.) I will plan to discuss all results including blood work, and/or imaging at our next visit, unless there is an urgent indication, in which case I will call you earlier. If you have any questions or concerns about your results, please feel free to call our office. Orders Placed This Encounter   Procedures    Comprehensive metabolic panel     This is a patient instruction: Patient fasting for 8 hours or longer recommended.      Standing Status:   Future     Standing Expiration Date:   10/27/2024    [facility]     Standing Status:   Future     Standing Expiration Date:   10/27/2024       OBJECTIVE:  Current Weight: Weight - Scale: 73 kg (161 lb)  Vitals:    10/27/23 1426   BP: 124/78   BP Location: Left arm   Patient Position: Sitting   Cuff Size: Standard   Pulse: 60   SpO2: 100%   Weight: 73 kg (161 lb)   Height: 5' 9" (1.753 m)    Body mass index is 23.78 kg/m². REVIEW OF SYSTEMS:    Review of Systems   Constitutional:  Positive for unexpected weight change. Negative for activity change and fever. Respiratory:  Negative for cough, chest tightness, shortness of breath and wheezing. Cardiovascular:  Positive for leg swelling. Negative for chest pain. Gastrointestinal:  Negative for abdominal pain, diarrhea, nausea and vomiting. Endocrine: Negative for polyuria. Genitourinary:  Negative for difficulty urinating, dysuria, flank pain, frequency and urgency. Skin:  Negative for rash. Neurological:  Negative for dizziness, syncope, light-headedness and headaches. PHYSICAL EXAM:      Physical Exam  Vitals and nursing note reviewed. Exam conducted with a chaperone present. Constitutional:       General: He is not in acute distress. Appearance: He is well-developed. He is not diaphoretic. HENT:      Head: Normocephalic and atraumatic. Eyes:      General: No scleral icterus. Pupils: Pupils are equal, round, and reactive to light. Cardiovascular:      Rate and Rhythm: Normal rate and regular rhythm. Heart sounds: Normal heart sounds. No murmur heard. No friction rub. No gallop. Pulmonary:      Effort: Pulmonary effort is normal. No respiratory distress. Breath sounds: Normal breath sounds. No wheezing or rales. Chest:      Chest wall: No tenderness. Abdominal:      General: Bowel sounds are normal. There is no distension. Palpations: Abdomen is soft. Tenderness: There is no abdominal tenderness. There is no rebound. Musculoskeletal:         General: Normal range of motion. Cervical back: Normal range of motion and neck supple. Right lower leg: Edema present. Left lower leg: Edema present. Skin:     Findings: No rash.    Neurological:      Mental Status: He is alert and oriented to person, place, and time. Medications:    Current Outpatient Medications:     allopurinol (ZYLOPRIM) 100 mg tablet, Take 100 mg by mouth 2 (two) times a day, Disp: , Rfl:     ascorbic acid (VITAMIN C) 500 MG tablet, Take 1 tablet (500 mg total) by mouth daily, Disp: , Rfl: 0    atorvastatin (LIPITOR) 40 mg tablet, Take 1 tablet (40 mg total) by mouth daily with dinner, Disp: 30 tablet, Rfl: 0    cholecalciferol (VITAMIN D3) 1,000 units tablet, Take 2 tablets (2,000 Units total) by mouth daily Do not start before July 12, 2023., Disp: 60 tablet, Rfl: 0    Eliquis 2.5 MG, TAKE ONE TABLET BY MOUTH TWICE A DAY, Disp: 60 tablet, Rfl: 11    ferrous sulfate 325 (65 Fe) mg tablet, Take 325 mg by mouth daily with breakfast, Disp: , Rfl:     glimepiride (AMARYL) 2 mg tablet, Take 1 tablet (2 mg total) by mouth daily with dinner, Disp: 30 tablet, Rfl: 0    glimepiride (AMARYL) 4 mg tablet, Take 1 tablet (4 mg total) by mouth daily with breakfast, Disp: , Rfl: 0    insulin glargine (LANTUS) 100 units/mL subcutaneous injection, Inject 5 Units under the skin daily at bedtime, Disp: 10 mL, Rfl: 0    latanoprost (XALATAN) 0.005 % ophthalmic solution, 1 drop daily at bedtime, Disp: , Rfl:     pantoprazole (PROTONIX) 40 mg tablet, Take 1 tablet (40 mg total) by mouth daily, Disp: 30 tablet, Rfl: 0    sitaGLIPtin (JANUVIA) 25 mg tablet, Take 1 tablet (25 mg total) by mouth daily Do not start before February 28, 2023., Disp: 30 tablet, Rfl: 0    tamsulosin (FLOMAX) 0.4 mg, Take 0.4 mg by mouth daily with dinner, Disp: , Rfl:     torsemide 40 MG TABS, Take 40 mg by mouth daily Do not start before July 12, 2023.  (Patient taking differently: Take 80 mg by mouth daily), Disp: 30 tablet, Rfl: 0    ZINC OXIDE PO, Take by mouth daily, Disp: , Rfl:     ALPRAZolam (XANAX) 0.5 mg tablet, 0.5 mg daily at bedtime , Disp: , Rfl: 0    Blood Pressure Monitor NILTON, by Does not apply route daily, Disp: 1 Device, Rfl: 0    docusate sodium (COLACE) 100 mg capsule, Take 1 capsule (100 mg total) by mouth 2 (two) times a day as needed for constipation (Patient not taking: Reported on 10/25/2023), Disp: , Rfl: 0    timolol (TIMOPTIC) 0.5 % ophthalmic solution, Administer 1 drop to both eyes daily, Disp: , Rfl:     Laboratory Results:  Results from last 7 days   Lab Units 10/24/23  0000   WBC Thousand/uL 5.90   HEMOGLOBIN g/dL 9.3*   HEMATOCRIT % 30.1*   PLATELETS Thousands/uL 171   POTASSIUM  4.4   CHLORIDE  94   CO2  25   BUN  60   CREATININE  1.76   CALCIUM  8.2   PHOSPHORUS  3.3       Results for orders placed or performed in visit on 10/24/23   PTH, intact   Result Value Ref Range        Phosphorus   Result Value Ref Range    EXTERNAL PHOSPHORUS 3.3    Comprehensive metabolic panel   Result Value Ref Range    SODIUM 135     POTASSIUM 4.4     CHLORIDE 94     CO2 25     BUN 60     CREATININE 1.76     Glucose 311     EXTERNAL CALCIUM 8.2     TOTAL PROTEIN 6.2     ALBUMIN 3.9     AST 13     ALT 12     ALK PHOS 216     EXTERNAL TOT.  BILIRUBIN 0.4     EXTERNAL EGFR 38    CBC   Result Value Ref Range    WBC 5.90 Thousand/uL    Hemoglobin 9.3 (A) 12.0 - 17.0 g/dL    Hematocrit 30.1 (A) 36.5 - 49.3 %    Platelets 693 443 - 187 Thousands/uL

## 2023-10-27 NOTE — PATIENT INSTRUCTIONS
1. )  Low 2 g sodium diet    2.)  Monitor weights at home    3.)  Avoid NSAIDs (ibuprofen, Motrin, Advil, Aleve, naproxen)    4.)  Monitor blood pressure at home, call if blood pressure greater than 150/90 persistently    5.) I will plan to discuss all results including blood work, and/or imaging at our next visit, unless there is an urgent indication, in which case I will call you earlier. If you have any questions or concerns about your results, please feel free to call our office.

## 2023-10-30 DIAGNOSIS — J90 PLEURAL EFFUSION: Primary | ICD-10-CM

## 2023-10-30 NOTE — PROGRESS NOTES
I placed order to [facility] to have right tunneled chest tube drained once a week.   Patient lives in [place] and this is only visiting nurse services available to patient

## 2023-11-06 ENCOUNTER — HOSPITAL ENCOUNTER (OUTPATIENT)
Dept: RADIOLOGY | Facility: HOSPITAL | Age: 83
Discharge: HOME | End: 2023-11-06
Payer: MEDICARE

## 2023-11-06 DIAGNOSIS — J90 PLEURAL EFFUSION EXUDATIVE: ICD-10-CM

## 2023-11-06 PROCEDURE — 71046 X-RAY EXAM CHEST 2 VIEWS: CPT

## 2023-11-14 ENCOUNTER — TELEPHONE (OUTPATIENT)
Dept: PULMONOLOGY | Facility: CLINIC | Age: 83
End: 2023-11-14

## 2023-11-14 NOTE — TELEPHONE ENCOUNTER
Patient son Erendira Matthews [facility] asking if we could please fax over the chest xray results Atif Reji completed on 11/6/23 to his family doctor, .  Please advise      Phone #[phone]  Fax #[phone]

## 2023-11-15 ENCOUNTER — TELEPHONE (OUTPATIENT)
Dept: PULMONOLOGY | Facility: CLINIC | Age: 83
End: 2023-11-15

## 2023-11-15 NOTE — TELEPHONE ENCOUNTER
Pulmonary    Called [facility] from [facility], patient had minimal drainage today and last week (5 mL).      Plan  Attempt weekly pleural drainage for now  Probably DC indwelling pleural catheter soon    SK

## 2023-11-15 NOTE — TELEPHONE ENCOUNTER
Montserrat Orozco from [facility] visiting nurse was out to drain patients pluerx and they didn't get anything out at  this time. He wants to know if you want this sone every 2 weeks if Montserrat Orozco doesn't answer you can leave a message.  [phone]

## 2023-11-18 ENCOUNTER — APPOINTMENT (EMERGENCY)
Dept: RADIOLOGY | Facility: HOSPITAL | Age: 83
DRG: 603 | End: 2023-11-18
Payer: MEDICARE

## 2023-11-18 ENCOUNTER — APPOINTMENT (INPATIENT)
Dept: RADIOLOGY | Facility: HOSPITAL | Age: 83
DRG: 603 | End: 2023-11-18
Payer: MEDICARE

## 2023-11-18 ENCOUNTER — HOSPITAL ENCOUNTER (INPATIENT)
Facility: HOSPITAL | Age: 83
LOS: 4 days | Discharge: NURSING, REHAB OR LONG-TERM CARE | DRG: 603 | End: 2023-11-22
Attending: EMERGENCY MEDICINE | Admitting: FAMILY MEDICINE
Payer: MEDICARE

## 2023-11-18 DIAGNOSIS — M89.9 CHRONIC KIDNEY DISEASE-MINERAL AND BONE DISORDER: ICD-10-CM

## 2023-11-18 DIAGNOSIS — D64.9 ANEMIA, UNSPECIFIED: ICD-10-CM

## 2023-11-18 DIAGNOSIS — N18.9 CHRONIC KIDNEY DISEASE-MINERAL AND BONE DISORDER: ICD-10-CM

## 2023-11-18 DIAGNOSIS — N17.9 AKI (ACUTE KIDNEY INJURY) (HCC): ICD-10-CM

## 2023-11-18 DIAGNOSIS — L03.116 CELLULITIS OF LEFT LOWER EXTREMITY: ICD-10-CM

## 2023-11-18 DIAGNOSIS — E83.9 CHRONIC KIDNEY DISEASE-MINERAL AND BONE DISORDER: ICD-10-CM

## 2023-11-18 DIAGNOSIS — D69.6 THROMBOCYTOPENIA (HCC): ICD-10-CM

## 2023-11-18 DIAGNOSIS — L03.90 CELLULITIS: Primary | ICD-10-CM

## 2023-11-18 DIAGNOSIS — E11.9 TYPE 2 DIABETES MELLITUS (HCC): ICD-10-CM

## 2023-11-18 PROBLEM — L97.929 DIABETIC ULCER OF LEFT LOWER LEG (HCC): Status: ACTIVE | Noted: 2023-11-18

## 2023-11-18 PROBLEM — E11.622 DIABETIC ULCER OF LEFT LOWER LEG (HCC): Status: ACTIVE | Noted: 2023-11-18

## 2023-11-18 LAB
ALBUMIN SERPL BCP-MCNC: 3.5 G/DL (ref 3.5–5)
ALP SERPL-CCNC: 134 U/L (ref 34–104)
ALT SERPL W P-5'-P-CCNC: 14 U/L (ref 7–52)
ANION GAP SERPL CALCULATED.3IONS-SCNC: 7 MMOL/L
ANISOCYTOSIS BLD QL SMEAR: PRESENT
AST SERPL W P-5'-P-CCNC: 15 U/L (ref 13–39)
BASOPHILS # BLD AUTO: 0.01 THOUSANDS/ÂΜL (ref 0–0.1)
BASOPHILS NFR BLD AUTO: 0 % (ref 0–1)
BILIRUB SERPL-MCNC: 0.69 MG/DL (ref 0.2–1)
BUN SERPL-MCNC: 81 MG/DL (ref 5–25)
CALCIUM SERPL-MCNC: 8.6 MG/DL (ref 8.4–10.2)
CHLORIDE SERPL-SCNC: 93 MMOL/L (ref 96–108)
CO2 SERPL-SCNC: 32 MMOL/L (ref 21–32)
CREAT SERPL-MCNC: 2.29 MG/DL (ref 0.6–1.3)
EOSINOPHIL # BLD AUTO: 0.06 THOUSAND/ÂΜL (ref 0–0.61)
EOSINOPHIL NFR BLD AUTO: 1 % (ref 0–6)
ERYTHROCYTE [DISTWIDTH] IN BLOOD BY AUTOMATED COUNT: 18.6 % (ref 11.6–15.1)
GFR SERPL CREATININE-BSD FRML MDRD: 25 ML/MIN/1.73SQ M
GLUCOSE SERPL-MCNC: 132 MG/DL (ref 65–140)
GLUCOSE SERPL-MCNC: 146 MG/DL (ref 65–140)
GLUCOSE SERPL-MCNC: 243 MG/DL (ref 65–140)
HCT VFR BLD AUTO: 31.4 % (ref 36.5–49.3)
HGB BLD-MCNC: 9.9 G/DL (ref 12–17)
IMM GRANULOCYTES # BLD AUTO: 0.01 THOUSAND/UL (ref 0–0.2)
IMM GRANULOCYTES NFR BLD AUTO: 0 % (ref 0–2)
LYMPHOCYTES # BLD AUTO: 0.9 THOUSANDS/ÂΜL (ref 0.6–4.47)
LYMPHOCYTES NFR BLD AUTO: 18 % (ref 14–44)
MACROCYTES BLD QL AUTO: PRESENT
MCH RBC QN AUTO: 29.7 PG (ref 26.8–34.3)
MCHC RBC AUTO-ENTMCNC: 31.5 G/DL (ref 31.4–37.4)
MCV RBC AUTO: 94 FL (ref 82–98)
MONOCYTES # BLD AUTO: 0.69 THOUSAND/ÂΜL (ref 0.17–1.22)
MONOCYTES NFR BLD AUTO: 14 % (ref 4–12)
NEUTROPHILS # BLD AUTO: 3.41 THOUSANDS/ÂΜL (ref 1.85–7.62)
NEUTS SEG NFR BLD AUTO: 67 % (ref 43–75)
NRBC BLD AUTO-RTO: 0 /100 WBCS
OVALOCYTES BLD QL SMEAR: PRESENT
PLATELET # BLD AUTO: 86 THOUSANDS/UL (ref 149–390)
PLATELET BLD QL SMEAR: ABNORMAL
PMV BLD AUTO: 9.5 FL (ref 8.9–12.7)
POTASSIUM SERPL-SCNC: 4.3 MMOL/L (ref 3.5–5.3)
PROCALCITONIN SERPL-MCNC: 0.07 NG/ML
PROT SERPL-MCNC: 6.3 G/DL (ref 6.4–8.4)
RBC # BLD AUTO: 3.33 MILLION/UL (ref 3.88–5.62)
RBC MORPH BLD: PRESENT
SCHISTOCYTES BLD QL SMEAR: PRESENT
SODIUM SERPL-SCNC: 132 MMOL/L (ref 135–147)
WBC # BLD AUTO: 5.08 THOUSAND/UL (ref 4.31–10.16)

## 2023-11-18 PROCEDURE — 99222 1ST HOSP IP/OBS MODERATE 55: CPT | Performed by: INTERNAL MEDICINE

## 2023-11-18 PROCEDURE — 83550 IRON BINDING TEST: CPT | Performed by: INTERNAL MEDICINE

## 2023-11-18 PROCEDURE — 82948 REAGENT STRIP/BLOOD GLUCOSE: CPT

## 2023-11-18 PROCEDURE — 73700 CT LOWER EXTREMITY W/O DYE: CPT

## 2023-11-18 PROCEDURE — 87205 SMEAR GRAM STAIN: CPT | Performed by: INTERNAL MEDICINE

## 2023-11-18 PROCEDURE — G1004 CDSM NDSC: HCPCS

## 2023-11-18 PROCEDURE — 85025 COMPLETE CBC W/AUTO DIFF WBC: CPT | Performed by: EMERGENCY MEDICINE

## 2023-11-18 PROCEDURE — 83540 ASSAY OF IRON: CPT | Performed by: INTERNAL MEDICINE

## 2023-11-18 PROCEDURE — 82746 ASSAY OF FOLIC ACID SERUM: CPT | Performed by: INTERNAL MEDICINE

## 2023-11-18 PROCEDURE — 84145 PROCALCITONIN (PCT): CPT | Performed by: INTERNAL MEDICINE

## 2023-11-18 PROCEDURE — 85046 RETICYTE/HGB CONCENTRATE: CPT | Performed by: INTERNAL MEDICINE

## 2023-11-18 PROCEDURE — 87070 CULTURE OTHR SPECIMN AEROBIC: CPT | Performed by: INTERNAL MEDICINE

## 2023-11-18 PROCEDURE — 96365 THER/PROPH/DIAG IV INF INIT: CPT

## 2023-11-18 PROCEDURE — 82607 VITAMIN B-12: CPT | Performed by: INTERNAL MEDICINE

## 2023-11-18 PROCEDURE — 87040 BLOOD CULTURE FOR BACTERIA: CPT | Performed by: EMERGENCY MEDICINE

## 2023-11-18 PROCEDURE — 99284 EMERGENCY DEPT VISIT MOD MDM: CPT

## 2023-11-18 PROCEDURE — 80053 COMPREHEN METABOLIC PANEL: CPT | Performed by: EMERGENCY MEDICINE

## 2023-11-18 PROCEDURE — 87077 CULTURE AEROBIC IDENTIFY: CPT | Performed by: INTERNAL MEDICINE

## 2023-11-18 PROCEDURE — 87186 SC STD MICRODIL/AGAR DIL: CPT | Performed by: INTERNAL MEDICINE

## 2023-11-18 PROCEDURE — 73590 X-RAY EXAM OF LOWER LEG: CPT

## 2023-11-18 PROCEDURE — 99285 EMERGENCY DEPT VISIT HI MDM: CPT | Performed by: EMERGENCY MEDICINE

## 2023-11-18 PROCEDURE — 36415 COLL VENOUS BLD VENIPUNCTURE: CPT | Performed by: EMERGENCY MEDICINE

## 2023-11-18 PROCEDURE — 96361 HYDRATE IV INFUSION ADD-ON: CPT

## 2023-11-18 PROCEDURE — 73610 X-RAY EXAM OF ANKLE: CPT

## 2023-11-18 RX ORDER — TAMSULOSIN HYDROCHLORIDE 0.4 MG/1
0.4 CAPSULE ORAL
Status: DISCONTINUED | OUTPATIENT
Start: 2023-11-18 | End: 2023-11-22 | Stop reason: HOSPADM

## 2023-11-18 RX ORDER — FERROUS SULFATE 325(65) MG
325 TABLET ORAL
Status: DISCONTINUED | OUTPATIENT
Start: 2023-11-19 | End: 2023-11-22 | Stop reason: HOSPADM

## 2023-11-18 RX ORDER — INSULIN GLARGINE 100 [IU]/ML
5 INJECTION, SOLUTION SUBCUTANEOUS
Status: DISCONTINUED | OUTPATIENT
Start: 2023-11-18 | End: 2023-11-22 | Stop reason: HOSPADM

## 2023-11-18 RX ORDER — LATANOPROST 50 UG/ML
1 SOLUTION/ DROPS OPHTHALMIC
Status: DISCONTINUED | OUTPATIENT
Start: 2023-11-18 | End: 2023-11-22 | Stop reason: HOSPADM

## 2023-11-18 RX ORDER — ALLOPURINOL 100 MG/1
100 TABLET ORAL 2 TIMES DAILY
Status: DISCONTINUED | OUTPATIENT
Start: 2023-11-19 | End: 2023-11-22 | Stop reason: HOSPADM

## 2023-11-18 RX ORDER — SODIUM CHLORIDE 9 MG/ML
75 INJECTION, SOLUTION INTRAVENOUS CONTINUOUS
Status: DISCONTINUED | OUTPATIENT
Start: 2023-11-18 | End: 2023-11-19

## 2023-11-18 RX ORDER — CEFTRIAXONE 1 G/50ML
1000 INJECTION, SOLUTION INTRAVENOUS ONCE
Status: COMPLETED | OUTPATIENT
Start: 2023-11-18 | End: 2023-11-18

## 2023-11-18 RX ORDER — TORSEMIDE 20 MG/1
80 TABLET ORAL DAILY
Status: DISCONTINUED | OUTPATIENT
Start: 2023-11-19 | End: 2023-11-19

## 2023-11-18 RX ORDER — ZINC SULFATE 50(220)MG
220 CAPSULE ORAL DAILY
Status: DISCONTINUED | OUTPATIENT
Start: 2023-11-19 | End: 2023-11-22 | Stop reason: HOSPADM

## 2023-11-18 RX ORDER — INSULIN LISPRO 100 [IU]/ML
1-5 INJECTION, SOLUTION INTRAVENOUS; SUBCUTANEOUS
Status: DISCONTINUED | OUTPATIENT
Start: 2023-11-19 | End: 2023-11-22 | Stop reason: HOSPADM

## 2023-11-18 RX ORDER — ACETAMINOPHEN 325 MG/1
650 TABLET ORAL EVERY 6 HOURS PRN
Status: DISCONTINUED | OUTPATIENT
Start: 2023-11-18 | End: 2023-11-22 | Stop reason: HOSPADM

## 2023-11-18 RX ORDER — GLIMEPIRIDE 2 MG/1
2 TABLET ORAL
Status: DISCONTINUED | OUTPATIENT
Start: 2023-11-19 | End: 2023-11-19

## 2023-11-18 RX ORDER — ATORVASTATIN CALCIUM 40 MG/1
40 TABLET, FILM COATED ORAL
Status: DISCONTINUED | OUTPATIENT
Start: 2023-11-18 | End: 2023-11-22 | Stop reason: HOSPADM

## 2023-11-18 RX ORDER — CEFAZOLIN SODIUM 1 G/50ML
1000 SOLUTION INTRAVENOUS EVERY 12 HOURS
Status: DISCONTINUED | OUTPATIENT
Start: 2023-11-18 | End: 2023-11-19

## 2023-11-18 RX ORDER — ONDANSETRON 2 MG/ML
4 INJECTION INTRAMUSCULAR; INTRAVENOUS EVERY 6 HOURS PRN
Status: DISCONTINUED | OUTPATIENT
Start: 2023-11-18 | End: 2023-11-22 | Stop reason: HOSPADM

## 2023-11-18 RX ORDER — MAGNESIUM HYDROXIDE/ALUMINUM HYDROXICE/SIMETHICONE 120; 1200; 1200 MG/30ML; MG/30ML; MG/30ML
30 SUSPENSION ORAL EVERY 6 HOURS PRN
Status: DISCONTINUED | OUTPATIENT
Start: 2023-11-18 | End: 2023-11-22 | Stop reason: HOSPADM

## 2023-11-18 RX ORDER — GLIMEPIRIDE 2 MG/1
4 TABLET ORAL
Status: DISCONTINUED | OUTPATIENT
Start: 2023-11-19 | End: 2023-11-19

## 2023-11-18 RX ORDER — INSULIN LISPRO 100 [IU]/ML
1-5 INJECTION, SOLUTION INTRAVENOUS; SUBCUTANEOUS
Status: DISCONTINUED | OUTPATIENT
Start: 2023-11-18 | End: 2023-11-22 | Stop reason: HOSPADM

## 2023-11-18 RX ORDER — HEPARIN SODIUM 5000 [USP'U]/ML
5000 INJECTION, SOLUTION INTRAVENOUS; SUBCUTANEOUS EVERY 8 HOURS SCHEDULED
Status: DISCONTINUED | OUTPATIENT
Start: 2023-11-18 | End: 2023-11-19

## 2023-11-18 RX ORDER — TIMOLOL MALEATE 5 MG/ML
1 SOLUTION/ DROPS OPHTHALMIC DAILY
Status: DISCONTINUED | OUTPATIENT
Start: 2023-11-19 | End: 2023-11-22 | Stop reason: HOSPADM

## 2023-11-18 RX ORDER — ALPRAZOLAM 0.5 MG/1
0.5 TABLET ORAL
Status: DISCONTINUED | OUTPATIENT
Start: 2023-11-18 | End: 2023-11-22 | Stop reason: HOSPADM

## 2023-11-18 RX ADMIN — CEFAZOLIN SODIUM 1000 MG: 1 SOLUTION INTRAVENOUS at 22:42

## 2023-11-18 RX ADMIN — HEPARIN SODIUM 5000 UNITS: 5000 INJECTION INTRAVENOUS; SUBCUTANEOUS at 22:42

## 2023-11-18 RX ADMIN — CEFTRIAXONE 1000 MG: 1 INJECTION, SOLUTION INTRAVENOUS at 16:25

## 2023-11-18 RX ADMIN — SODIUM CHLORIDE 500 ML: 0.9 INJECTION, SOLUTION INTRAVENOUS at 16:18

## 2023-11-18 RX ADMIN — INSULIN LISPRO 2 UNITS: 100 INJECTION, SOLUTION INTRAVENOUS; SUBCUTANEOUS at 22:42

## 2023-11-18 RX ADMIN — SODIUM CHLORIDE 75 ML/HR: 0.9 INJECTION, SOLUTION INTRAVENOUS at 22:41

## 2023-11-18 NOTE — ED NOTES
Left lower leg, top of, with 9x8 area that was a blister, but is now popped, red, and with small blisters to side of, that are fluid filled. Open blister area cleansed with saline, non adhering dressing placed over top with clean dry dressing, but not wrapped in case he is admitted and floor will need to assess wound and take photos. Son at bedside. Left lower leg and foot with +2-3 edema noted, pulses hard to palpate due to edema. Right foot also with +2 edema noted, with faint pulses noted.      Sintia Pandya, RN  11/18/23 [facility], RN  11/18/23 1769

## 2023-11-18 NOTE — ED PROVIDER NOTES
History  Chief Complaint   Patient presents with    Leg Swelling     Patient brought by son with whom he resides. Patient has visiting nurse for left lower leg wound for about 1-2 weeks. Son states visiting nurse stated he needs ABX. Patient c/o ankle pain. Wound Infection     68-year-old male presents with complaints of open wound which was a blister and he states he wears pressure socks for his edema in his legs his visiting nurse states that the wound does start to be looking bad at this point and feels that he should be on antibiotics and seek further treatment. Patient denies any fevers at home states he is eating well no nausea vomiting diarrhea or systemic symptoms. Prior to Admission Medications   Prescriptions Last Dose Informant Patient Reported? Taking? ALPRAZolam (XANAX) 0.5 mg tablet  Child Yes No   Si.5 mg daily at bedtime    Blood Pressure Monitor NILTON  Child No No   Sig: by Does not apply route daily   Eliquis 2.5 MG  Child No No   Sig: TAKE ONE TABLET BY MOUTH TWICE A DAY   ZINC OXIDE PO  Child Yes No   Sig: Take by mouth daily   allopurinol (ZYLOPRIM) 100 mg tablet  Child Yes No   Sig: Take 100 mg by mouth 2 (two) times a day   ascorbic acid (VITAMIN C) 500 MG tablet  Child No No   Sig: Take 1 tablet (500 mg total) by mouth daily   atorvastatin (LIPITOR) 40 mg tablet  Child No No   Sig: Take 1 tablet (40 mg total) by mouth daily with dinner   cholecalciferol (VITAMIN D3) 1,000 units tablet  Child No No   Sig: Take 2 tablets (2,000 Units total) by mouth daily Do not start before 2023.    docusate sodium (COLACE) 100 mg capsule  Child No No   Sig: Take 1 capsule (100 mg total) by mouth 2 (two) times a day as needed for constipation   Patient not taking: Reported on 10/25/2023   ferrous sulfate 325 (65 Fe) mg tablet  Child Yes No   Sig: Take 325 mg by mouth daily with breakfast   glimepiride (AMARYL) 2 mg tablet  Child No No   Sig: Take 1 tablet (2 mg total) by mouth daily with dinner   glimepiride (AMARYL) 4 mg tablet  Child No No   Sig: Take 1 tablet (4 mg total) by mouth daily with breakfast   insulin glargine (LANTUS) 100 units/mL subcutaneous injection  Child No No   Sig: Inject 5 Units under the skin daily at bedtime   latanoprost (XALATAN) 0.005 % ophthalmic solution  Child Yes No   Si drop daily at bedtime   pantoprazole (PROTONIX) 40 mg tablet  Child No No   Sig: Take 1 tablet (40 mg total) by mouth daily   sitaGLIPtin (JANUVIA) 25 mg tablet  Child No No   Sig: Take 1 tablet (25 mg total) by mouth daily Do not start before 2023. tamsulosin (FLOMAX) 0.4 mg  Child Yes No   Sig: Take 0.4 mg by mouth daily with dinner   timolol (TIMOPTIC) 0.5 % ophthalmic solution  Child No No   Sig: Administer 1 drop to both eyes daily   torsemide 40 MG TABS  Child No No   Sig: Take 40 mg by mouth daily Do not start before 2023. Patient taking differently: Take 80 mg by mouth daily      Facility-Administered Medications: None       Past Medical History:   Diagnosis Date    Atrial fibrillation ([address])     Chronic kidney disease     Coronary artery disease     Diabetes mellitus ([address])     Hyperlipidemia     Hypertension        Past Surgical History:   Procedure Laterality Date    CARDIAC CATHETERIZATION N/A 2023    Procedure: Cardiac pericardiocentesis; Surgeon: Opal Ralph DO;  Location: [facility]; Service: Cardiology    CARDIAC CATHETERIZATION N/A 2023    Procedure: Cardiac RHC; Surgeon: Opal Ralph DO;  Location: [facility];   Service: Cardiology    EYE SURGERY      IR CHEST TUBE PLACEMENT  2023    IR CHEST TUBE PLACEMENT  2023    IR PLEURAL EFFUSION LONG-TERM CATHETER PLACEMENT  2023    SKIN CANCER EXCISION      TONSILLECTOMY         Family History   Problem Relation Age of Onset    Heart disease Mother     Diabetes Father     Vision loss Father     Cancer Sister     Diabetes Sister      I have reviewed and agree with the history as documented. E-Cigarette/Vaping    E-Cigarette Use Never User      E-Cigarette/Vaping Substances    Nicotine No     THC No     CBD No     Flavoring No     Other No     Unknown No      Social History     Tobacco Use    Smoking status: Former    Smokeless tobacco: Former   Vaping Use    Vaping Use: Never used   Substance Use Topics    Alcohol use: Not Currently     Alcohol/week: 0.0 standard drinks of alcohol     Comment: special occasions    Drug use: Not Currently       Review of Systems   Constitutional:  Negative for activity change, chills, diaphoresis and fever. HENT:  Negative for congestion, ear pain, nosebleeds, sore throat, trouble swallowing and voice change. Eyes:  Negative for pain, discharge and redness. Respiratory:  Negative for apnea, cough, choking, shortness of breath, wheezing and stridor. Cardiovascular:  Negative for chest pain and palpitations. Gastrointestinal:  Negative for abdominal distention, abdominal pain, constipation, diarrhea, nausea and vomiting. Endocrine: Negative for polydipsia. Genitourinary:  Negative for difficulty urinating, dysuria, flank pain, frequency, hematuria and urgency. Musculoskeletal:  Negative for back pain, gait problem, joint swelling, myalgias, neck pain and neck stiffness. Skin:  Positive for wound. Negative for pallor and rash. Neurological:  Negative for dizziness, tremors, syncope, speech difficulty, weakness, numbness and headaches. Hematological:  Negative for adenopathy. Psychiatric/Behavioral:  Negative for confusion, hallucinations, self-injury and suicidal ideas. The patient is not nervous/anxious. Physical Exam  Physical Exam  Vitals and nursing note reviewed. Constitutional:       General: He is not in acute distress. Appearance: He is well-developed. He is not diaphoretic. HENT:      Head: Normocephalic and atraumatic.       Right Ear: External ear normal.      Left Ear: External ear normal.      Nose: Nose normal.   Eyes:      Conjunctiva/sclera: Conjunctivae normal.      Pupils: Pupils are equal, round, and reactive to light. Cardiovascular:      Rate and Rhythm: Normal rate and regular rhythm. Heart sounds: Normal heart sounds. Pulmonary:      Effort: Pulmonary effort is normal.      Breath sounds: Normal breath sounds. Abdominal:      General: Bowel sounds are normal.      Palpations: Abdomen is soft. Tenderness: There is abdominal tenderness. Comments: Diffuse tenderness   Musculoskeletal:         General: Normal range of motion. Cervical back: Normal range of motion and neck supple. Skin:     General: Skin is warm and dry. Comments: Patient does have weeping blister wounds left anterior tibial region. Surrounding erythema noted   Neurological:      Mental Status: He is alert and oriented to person, place, and time. Deep Tendon Reflexes: Reflexes are normal and symmetric. Psychiatric:         Behavior: Behavior is cooperative.          Vital Signs  ED Triage Vitals [11/18/23 1510]   Temperature Pulse Respirations Blood Pressure SpO2   (!) 96.6 °F (35.9 °C) 63 20 138/60 100 %      Temp Source Heart Rate Source Patient Position - Orthostatic VS BP Location FiO2 (%)   Temporal Monitor Sitting Left arm --      Pain Score       --           Vitals:    11/18/23 1510 11/18/23 1545 11/18/23 1730 11/18/23 1800   BP: 138/60 156/65 134/64 140/81   Pulse: 63 66 68 70   Patient Position - Orthostatic VS: Sitting            Visual Acuity      ED Medications  Medications   sodium chloride 0.9 % bolus 500 mL (500 mL Intravenous New Bag 11/18/23 1618)   cefTRIAXone (ROCEPHIN) IVPB (premix in dextrose) 1,000 mg 50 mL (0 mg Intravenous Stopped 11/18/23 1731)       Diagnostic Studies  Results Reviewed       Procedure Component Value Units Date/Time    Fingerstick Glucose (POCT) [[id number]]  (Abnormal) Collected: 11/18/23 1802    Lab Status: Final result Updated: 11/18/23 1803     POC Glucose 146 mg/dl     CBC and differential [[id number]]  (Abnormal) Collected: 11/18/23 1617    Lab Status: Final result Specimen: Blood from Arm, Right Updated: 11/18/23 1700     WBC 5.08 Thousand/uL      RBC 3.33 Million/uL      Hemoglobin 9.9 g/dL      Hematocrit 31.4 %      MCV 94 fL      MCH 29.7 pg      MCHC 31.5 g/dL      RDW 18.6 %      MPV 9.5 fL      Platelets 86 Thousands/uL      nRBC 0 /100 WBCs      Neutrophils Relative 67 %      Immat GRANS % 0 %      Lymphocytes Relative 18 %      Monocytes Relative 14 %      Eosinophils Relative 1 %      Basophils Relative 0 %      Neutrophils Absolute 3.41 Thousands/µL      Immature Grans Absolute 0.01 Thousand/uL      Lymphocytes Absolute 0.90 Thousands/µL      Monocytes Absolute 0.69 Thousand/µL      Eosinophils Absolute 0.06 Thousand/µL      Basophils Absolute 0.01 Thousands/µL     Narrative: This is an appended report. These results have been appended to a previously verified report.     Smear Review(Phlebs Do Not Order) [[id number]]  (Abnormal) Collected: 11/18/23 1617    Lab Status: Final result Specimen: Blood from Arm, Right Updated: 11/18/23 1700     RBC Morphology Present     Platelet Estimate Decreased     Anisocytosis Present     Macrocytes Present     Ovalocytes Present     Schistocytes Present    Comprehensive metabolic panel [[id number]]  (Abnormal) Collected: 11/18/23 1617    Lab Status: Final result Specimen: Blood from Arm, Right Updated: 11/18/23 1642     Sodium 132 mmol/L      Potassium 4.3 mmol/L      Chloride 93 mmol/L      CO2 32 mmol/L      ANION GAP 7 mmol/L      BUN 81 mg/dL      Creatinine 2.29 mg/dL      Glucose 132 mg/dL      Calcium 8.6 mg/dL      AST 15 U/L      ALT 14 U/L      Alkaline Phosphatase 134 U/L      Total Protein 6.3 g/dL      Albumin 3.5 g/dL      Total Bilirubin 0.69 mg/dL      eGFR 25 ml/min/1.73sq m     Narrative:      [facility] Disease (CKD): Stage 1 with normal or high GFR (GFR > 90 mL/min/1.73 square meters)    Stage 2 Mild CKD (GFR = 60-89 mL/min/1.73 square meters)    Stage 3A Moderate CKD (GFR = 45-59 mL/min/1.73 square meters)    Stage 3B Moderate CKD (GFR = 30-44 mL/min/1.73 square meters)    Stage 4 Severe CKD (GFR = 15-29 mL/min/1.73 square meters)    Stage 5 End Stage CKD (GFR <15 mL/min/1.73 square meters)  Note: GFR calculation is accurate only with a steady state creatinine    Blood culture #2 [[id number]] Collected: 11/18/23 1617    Lab Status: In process Specimen: Blood from Arm, Right Updated: 11/18/23 1625    Blood culture #1 [[id number]] Collected: 11/18/23 1617    Lab Status: In process Specimen: Blood from Hand, Left Updated: 11/18/23 1624                   XR tibia fibula 2 views LEFT    (Results Pending)   XR ankle 3+ views LEFT    (Results Pending)              Procedures  Procedures         ED Course                               SBIRT 22yo+      Flowsheet Row Most Recent Value   Initial Alcohol Screen: US AUDIT-C     1. How often do you have a drink containing alcohol? 0 Filed at: 11/18/2023 1632   2. How many drinks containing alcohol do you have on a typical day you are drinking? 0 Filed at: 11/18/2023 1632   3a. Male UNDER 65: How often do you have five or more drinks on one occasion? 0 Filed at: 11/18/2023 1632   3b. FEMALE Any Age, or MALE 65+: How often do you have 4 or more drinks on one occassion? 0 Filed at: 11/18/2023 1632   Audit-C Score 0 Filed at: 11/18/2023 2731   ALEJANDRO: How many times in the past year have you. .. Used an illegal drug or used a prescription medication for non-medical reasons? Never Filed at: 11/18/2023 1632                      Medical Decision Making  68-year-old male with cellulitis/weeping lesion on his left lower extremity.   Will need IV antibiotics stay in the hospital and further work-up on his acute kidney injury as his creatinine has bumped up from his prior visit    Amount and/or Complexity of Data Reviewed  Labs: ordered. Radiology: ordered. Discussion of management or test interpretation with external provider(s): Has been admitted to the hospitalist service for further work-up. Risk  Prescription drug management. Decision regarding hospitalization. Disposition  Final diagnoses:   Cellulitis   KARINA (acute kidney injury) ([address])     Time reflects when diagnosis was documented in both MDM as applicable and the Disposition within this note       Time User Action Codes Description Comment    11/18/2023  6:05 PM Charlotte Uribe Add [L03.90] Cellulitis     11/18/2023  6:05 PM Andie EVANGELISTA Add [N17.9] KARINA (acute kidney injury) [facility])           ED Disposition       ED Disposition   Admit    Condition   Stable    Date/Time   Sat Nov 18, 2023 3156    Comment   Case was discussed with Dr. Elle Kraus and the patient's admission status was agreed to be Admission Status: inpatient status to the service of Dr. Elle Kraus . Follow-up Information    None         Patient's Medications   Discharge Prescriptions    No medications on file       No discharge procedures on file.     PDMP Review       None            ED Provider  Electronically Signed by             Charlotte Uribe DO  11/18/23 8962

## 2023-11-19 PROBLEM — M1A.9XX0 CHRONIC GOUT: Status: ACTIVE | Noted: 2023-11-19

## 2023-11-19 PROBLEM — N40.0 BPH (BENIGN PROSTATIC HYPERPLASIA): Status: ACTIVE | Noted: 2023-11-19

## 2023-11-19 PROBLEM — I50.42 CHRONIC COMBINED SYSTOLIC AND DIASTOLIC CHF (CONGESTIVE HEART FAILURE) (HCC): Status: ACTIVE | Noted: 2023-11-19

## 2023-11-19 PROBLEM — I50.22 CHRONIC SYSTOLIC (CONGESTIVE) HEART FAILURE (HCC): Status: ACTIVE | Noted: 2023-11-19

## 2023-11-19 PROBLEM — E78.5 DYSLIPIDEMIA: Status: ACTIVE | Noted: 2023-11-19

## 2023-11-19 PROBLEM — L03.116 CELLULITIS OF LEFT LOWER EXTREMITY: Status: ACTIVE | Noted: 2023-11-18

## 2023-11-19 PROBLEM — I48.20 CHRONIC ATRIAL FIBRILLATION (HCC): Status: ACTIVE | Noted: 2021-01-05

## 2023-11-19 LAB
ALBUMIN SERPL BCP-MCNC: 3.2 G/DL (ref 3.5–5)
ALP SERPL-CCNC: 123 U/L (ref 34–104)
ALT SERPL W P-5'-P-CCNC: 13 U/L (ref 7–52)
ANION GAP SERPL CALCULATED.3IONS-SCNC: 6 MMOL/L
APTT PPP: 51 SECONDS (ref 23–37)
AST SERPL W P-5'-P-CCNC: 14 U/L (ref 13–39)
BASOPHILS # BLD AUTO: 0.01 THOUSANDS/ÂΜL (ref 0–0.1)
BASOPHILS # BLD AUTO: 0.01 THOUSANDS/ÂΜL (ref 0–0.1)
BASOPHILS NFR BLD AUTO: 0 % (ref 0–1)
BASOPHILS NFR BLD AUTO: 0 % (ref 0–1)
BILIRUB SERPL-MCNC: 0.57 MG/DL (ref 0.2–1)
BUN SERPL-MCNC: 76 MG/DL (ref 5–25)
CALCIUM ALBUM COR SERPL-MCNC: 8.8 MG/DL (ref 8.3–10.1)
CALCIUM SERPL-MCNC: 8.2 MG/DL (ref 8.4–10.2)
CHLORIDE SERPL-SCNC: 99 MMOL/L (ref 96–108)
CO2 SERPL-SCNC: 32 MMOL/L (ref 21–32)
CREAT SERPL-MCNC: 2.09 MG/DL (ref 0.6–1.3)
EOSINOPHIL # BLD AUTO: 0.07 THOUSAND/ÂΜL (ref 0–0.61)
EOSINOPHIL # BLD AUTO: 0.09 THOUSAND/ÂΜL (ref 0–0.61)
EOSINOPHIL NFR BLD AUTO: 2 % (ref 0–6)
EOSINOPHIL NFR BLD AUTO: 2 % (ref 0–6)
ERYTHROCYTE [DISTWIDTH] IN BLOOD BY AUTOMATED COUNT: 18.4 % (ref 11.6–15.1)
ERYTHROCYTE [DISTWIDTH] IN BLOOD BY AUTOMATED COUNT: 18.4 % (ref 11.6–15.1)
FOLATE SERPL-MCNC: 16.8 NG/ML
GFR SERPL CREATININE-BSD FRML MDRD: 28 ML/MIN/1.73SQ M
GGT SERPL-CCNC: 18 U/L (ref 9–64)
GLUCOSE SERPL-MCNC: 107 MG/DL (ref 65–140)
GLUCOSE SERPL-MCNC: 110 MG/DL (ref 65–140)
GLUCOSE SERPL-MCNC: 118 MG/DL (ref 65–140)
GLUCOSE SERPL-MCNC: 196 MG/DL (ref 65–140)
GLUCOSE SERPL-MCNC: 89 MG/DL (ref 65–140)
GLUCOSE SERPL-MCNC: 99 MG/DL (ref 65–140)
HCT VFR BLD AUTO: 30.6 % (ref 36.5–49.3)
HCT VFR BLD AUTO: 31 % (ref 36.5–49.3)
HGB BLD-MCNC: 9.6 G/DL (ref 12–17)
HGB BLD-MCNC: 9.7 G/DL (ref 12–17)
HGB RETIC QN AUTO: 33.3 PG (ref 30–38.3)
IMM GRANULOCYTES # BLD AUTO: 0.01 THOUSAND/UL (ref 0–0.2)
IMM GRANULOCYTES # BLD AUTO: 0.01 THOUSAND/UL (ref 0–0.2)
IMM GRANULOCYTES NFR BLD AUTO: 0 % (ref 0–2)
IMM GRANULOCYTES NFR BLD AUTO: 0 % (ref 0–2)
IMM RETICS NFR: 15.1 % (ref 0–14)
INR PPP: 1.53 (ref 0.84–1.19)
IRON SATN MFR SERPL: 17 % (ref 15–50)
IRON SERPL-MCNC: 55 UG/DL (ref 50–212)
LDH SERPL-CCNC: 116 U/L (ref 140–271)
LIPASE SERPL-CCNC: 25 U/L (ref 11–82)
LYMPHOCYTES # BLD AUTO: 0.66 THOUSANDS/ÂΜL (ref 0.6–4.47)
LYMPHOCYTES # BLD AUTO: 0.99 THOUSANDS/ÂΜL (ref 0.6–4.47)
LYMPHOCYTES NFR BLD AUTO: 17 % (ref 14–44)
LYMPHOCYTES NFR BLD AUTO: 21 % (ref 14–44)
MAGNESIUM SERPL-MCNC: 2.1 MG/DL (ref 1.9–2.7)
MCH RBC QN AUTO: 29.6 PG (ref 26.8–34.3)
MCH RBC QN AUTO: 29.7 PG (ref 26.8–34.3)
MCHC RBC AUTO-ENTMCNC: 31.3 G/DL (ref 31.4–37.4)
MCHC RBC AUTO-ENTMCNC: 31.4 G/DL (ref 31.4–37.4)
MCV RBC AUTO: 94 FL (ref 82–98)
MCV RBC AUTO: 95 FL (ref 82–98)
MONOCYTES # BLD AUTO: 0.59 THOUSAND/ÂΜL (ref 0.17–1.22)
MONOCYTES # BLD AUTO: 0.62 THOUSAND/ÂΜL (ref 0.17–1.22)
MONOCYTES NFR BLD AUTO: 13 % (ref 4–12)
MONOCYTES NFR BLD AUTO: 16 % (ref 4–12)
NEUTROPHILS # BLD AUTO: 2.47 THOUSANDS/ÂΜL (ref 1.85–7.62)
NEUTROPHILS # BLD AUTO: 2.93 THOUSANDS/ÂΜL (ref 1.85–7.62)
NEUTS SEG NFR BLD AUTO: 64 % (ref 43–75)
NEUTS SEG NFR BLD AUTO: 65 % (ref 43–75)
NRBC BLD AUTO-RTO: 0 /100 WBCS
NRBC BLD AUTO-RTO: 0 /100 WBCS
PHOSPHATE SERPL-MCNC: 3.7 MG/DL (ref 2.3–4.1)
PLATELET # BLD AUTO: 77 THOUSANDS/UL (ref 149–390)
PLATELET # BLD AUTO: 85 THOUSANDS/UL (ref 149–390)
PMV BLD AUTO: 9.5 FL (ref 8.9–12.7)
PMV BLD AUTO: 9.5 FL (ref 8.9–12.7)
POTASSIUM SERPL-SCNC: 3.6 MMOL/L (ref 3.5–5.3)
PROT SERPL-MCNC: 5.8 G/DL (ref 6.4–8.4)
PROTHROMBIN TIME: 18.6 SECONDS (ref 11.6–14.5)
RBC # BLD AUTO: 3.24 MILLION/UL (ref 3.88–5.62)
RBC # BLD AUTO: 3.27 MILLION/UL (ref 3.88–5.62)
RETICS # AUTO: ABNORMAL 10*3/UL (ref 14356–105094)
RETICS # CALC: 1.21 % (ref 0.37–1.87)
SARS-COV-2 RNA RESP QL NAA+PROBE: NEGATIVE
SODIUM SERPL-SCNC: 137 MMOL/L (ref 135–147)
TIBC SERPL-MCNC: 331 UG/DL (ref 250–450)
UIBC SERPL-MCNC: 276 UG/DL (ref 155–355)
VIT B12 SERPL-MCNC: 320 PG/ML (ref 180–914)
WBC # BLD AUTO: 3.81 THOUSAND/UL (ref 4.31–10.16)
WBC # BLD AUTO: 4.65 THOUSAND/UL (ref 4.31–10.16)

## 2023-11-19 PROCEDURE — 83690 ASSAY OF LIPASE: CPT | Performed by: INTERNAL MEDICINE

## 2023-11-19 PROCEDURE — 82977 ASSAY OF GGT: CPT | Performed by: INTERNAL MEDICINE

## 2023-11-19 PROCEDURE — 83010 ASSAY OF HAPTOGLOBIN QUANT: CPT | Performed by: INTERNAL MEDICINE

## 2023-11-19 PROCEDURE — 85730 THROMBOPLASTIN TIME PARTIAL: CPT | Performed by: INTERNAL MEDICINE

## 2023-11-19 PROCEDURE — 85025 COMPLETE CBC W/AUTO DIFF WBC: CPT | Performed by: INTERNAL MEDICINE

## 2023-11-19 PROCEDURE — 83735 ASSAY OF MAGNESIUM: CPT | Performed by: INTERNAL MEDICINE

## 2023-11-19 PROCEDURE — 82948 REAGENT STRIP/BLOOD GLUCOSE: CPT

## 2023-11-19 PROCEDURE — 84100 ASSAY OF PHOSPHORUS: CPT | Performed by: INTERNAL MEDICINE

## 2023-11-19 PROCEDURE — 85610 PROTHROMBIN TIME: CPT | Performed by: INTERNAL MEDICINE

## 2023-11-19 PROCEDURE — 87635 SARS-COV-2 COVID-19 AMP PRB: CPT | Performed by: INTERNAL MEDICINE

## 2023-11-19 PROCEDURE — 80053 COMPREHEN METABOLIC PANEL: CPT | Performed by: INTERNAL MEDICINE

## 2023-11-19 PROCEDURE — 83615 LACTATE (LD) (LDH) ENZYME: CPT | Performed by: INTERNAL MEDICINE

## 2023-11-19 PROCEDURE — 99232 SBSQ HOSP IP/OBS MODERATE 35: CPT | Performed by: FAMILY MEDICINE

## 2023-11-19 RX ORDER — TORSEMIDE 20 MG/1
80 TABLET ORAL DAILY
Status: DISCONTINUED | OUTPATIENT
Start: 2023-11-19 | End: 2023-11-22 | Stop reason: HOSPADM

## 2023-11-19 RX ORDER — CEFAZOLIN SODIUM 2 G/50ML
2000 SOLUTION INTRAVENOUS EVERY 12 HOURS
Status: DISCONTINUED | OUTPATIENT
Start: 2023-11-19 | End: 2023-11-22

## 2023-11-19 RX ADMIN — ALLOPURINOL 100 MG: 100 TABLET ORAL at 17:39

## 2023-11-19 RX ADMIN — ATORVASTATIN CALCIUM 40 MG: 40 TABLET, FILM COATED ORAL at 16:41

## 2023-11-19 RX ADMIN — ZINC SULFATE 220 MG (50 MG) CAPSULE 220 MG: CAPSULE at 09:04

## 2023-11-19 RX ADMIN — APIXABAN 2.5 MG: 2.5 TABLET, FILM COATED ORAL at 17:39

## 2023-11-19 RX ADMIN — CEFAZOLIN SODIUM 2000 MG: 2 SOLUTION INTRAVENOUS at 21:20

## 2023-11-19 RX ADMIN — CEFAZOLIN SODIUM 2000 MG: 2 SOLUTION INTRAVENOUS at 09:47

## 2023-11-19 RX ADMIN — APIXABAN 2.5 MG: 2.5 TABLET, FILM COATED ORAL at 09:04

## 2023-11-19 RX ADMIN — TORSEMIDE 80 MG: 20 TABLET ORAL at 14:52

## 2023-11-19 RX ADMIN — FERROUS SULFATE TAB 325 MG (65 MG ELEMENTAL FE) 325 MG: 325 (65 FE) TAB at 09:04

## 2023-11-19 RX ADMIN — GLIMEPIRIDE 2 MG: 2 TABLET ORAL at 00:47

## 2023-11-19 RX ADMIN — INSULIN GLARGINE 5 UNITS: 100 INJECTION, SOLUTION SUBCUTANEOUS at 21:20

## 2023-11-19 RX ADMIN — SODIUM CHLORIDE 75 ML/HR: 0.9 INJECTION, SOLUTION INTRAVENOUS at 12:48

## 2023-11-19 RX ADMIN — ATORVASTATIN CALCIUM 40 MG: 40 TABLET, FILM COATED ORAL at 00:43

## 2023-11-19 RX ADMIN — TAMSULOSIN HYDROCHLORIDE 0.4 MG: 0.4 CAPSULE ORAL at 00:43

## 2023-11-19 RX ADMIN — INSULIN GLARGINE 5 UNITS: 100 INJECTION, SOLUTION SUBCUTANEOUS at 00:43

## 2023-11-19 RX ADMIN — TAMSULOSIN HYDROCHLORIDE 0.4 MG: 0.4 CAPSULE ORAL at 16:41

## 2023-11-19 RX ADMIN — ALLOPURINOL 100 MG: 100 TABLET ORAL at 09:04

## 2023-11-19 RX ADMIN — TIMOLOL MALEATE 1 DROP: 5 SOLUTION/ DROPS OPHTHALMIC at 09:08

## 2023-11-19 RX ADMIN — LATANOPROST 1 DROP: 50 SOLUTION OPHTHALMIC at 21:20

## 2023-11-19 RX ADMIN — INSULIN LISPRO 1 UNITS: 100 INJECTION, SOLUTION INTRAVENOUS; SUBCUTANEOUS at 21:20

## 2023-11-19 NOTE — ASSESSMENT & PLAN NOTE
Lab Results   Component Value Date    EGFR 25 11/18/2023    EGFR 38 10/24/2023    EGFR 28 08/10/2023    CREATININE 2.29 (H) 11/18/2023    CREATININE 1.76 10/24/2023    CREATININE 2.05 (H) 08/10/2023         - Acute on chronic secondary to infection and decreased oral intake / pre renal   - UA ordered   - IVF and repeat BMP in am - cw flomax and consult renal if no improvement

## 2023-11-19 NOTE — PLAN OF CARE
Problem: INFECTION - ADULT  Goal: Absence or prevention of progression during hospitalization  Description: INTERVENTIONS:  - Assess and monitor for signs and symptoms of infection  - Monitor lab/diagnostic results  - Monitor all insertion sites, i.e. indwelling lines, tubes, and drains  - Monitor endotracheal if appropriate and nasal secretions for changes in amount and color  - [place] appropriate cooling/warming therapies per order  - Administer medications as ordered  - Instruct and encourage patient and family to use good hand hygiene technique  - Identify and instruct in appropriate isolation precautions for identified infection/condition  Outcome: Progressing     Problem: PAIN - ADULT  Goal: Verbalizes/displays adequate comfort level or baseline comfort level  Description: Interventions:  - Encourage patient to monitor pain and request assistance  - Assess pain using appropriate pain scale  - Administer analgesics based on type and severity of pain and evaluate response  - Implement non-pharmacological measures as appropriate and evaluate response  - Consider cultural and social influences on pain and pain management  - Notify physician/advanced practitioner if interventions unsuccessful or patient reports new pain  Outcome: Progressing

## 2023-11-19 NOTE — PLAN OF CARE
Problem: Potential for Falls  Goal: Patient will remain free of falls  Description: INTERVENTIONS:  - Educate patient/family on patient safety including physical limitations  - Instruct patient to call for assistance with activity   - Consult OT/PT to assist with strengthening/mobility   - Keep Call bell within reach  - Keep bed low and locked with side rails adjusted as appropriate  - Keep care items and personal belongings within reach  - Initiate and maintain comfort rounds  - Make Fall Risk Sign visible to staff  - Offer Toileting every 2 Hours, in advance of need  - Initiate/Maintain bed alarm  - Obtain necessary fall risk management equipment:   - Apply yellow socks and bracelet for high fall risk patients  - Consider moving patient to room near nurses station  Outcome: Progressing     Problem: PAIN - ADULT  Goal: Verbalizes/displays adequate comfort level or baseline comfort level  Description: Interventions:  - Encourage patient to monitor pain and request assistance  - Assess pain using appropriate pain scale  - Administer analgesics based on type and severity of pain and evaluate response  - Implement non-pharmacological measures as appropriate and evaluate response  - Consider cultural and social influences on pain and pain management  - Notify physician/advanced practitioner if interventions unsuccessful or patient reports new pain  Outcome: Progressing     Problem: INFECTION - ADULT  Goal: Absence or prevention of progression during hospitalization  Description: INTERVENTIONS:  - Assess and monitor for signs and symptoms of infection  - Monitor lab/diagnostic results  - Administer medications as ordered  - Instruct and encourage patient and family to use good hand hygiene technique  Outcome: Progressing

## 2023-11-19 NOTE — ASSESSMENT & PLAN NOTE
. Chronic Thrombocytopenia with acute drop probably due to infection:   - Platelets at 86, repeat stat to ensure no further drop  - Continue Eliquis for now 2.5 mg po bid - low threshold for cardiology consult  - Investigate potential causes, including MDS, nutritional deficiencies (B12, folate), or hemolysis - work up ordered   - Order hemolysis workup, B12 and folate levels, and hematology consult requested  - Consult cardiology if platelets drop further and Eliquis needs to be held  - Less likely but work up to r/o MAHA ordered.

## 2023-11-19 NOTE — ASSESSMENT & PLAN NOTE
Left lower extremity cellulitis:  - CT LLE wo contrast:   Diffuse subcutaneous soft tissue edema and inflammatory change throughout the distal left lower extremity consistent with cellulitis. .   - was given a dose of IV Ceftriaxone in ER  - Hemodynamically stable  - Continue IV Ancef as ordered.   - Obtain wound gram stain and culture  - Consult infectious disease and wound care  - Monitor septic workup, procalcitonin, and lactic acid levels

## 2023-11-19 NOTE — ASSESSMENT & PLAN NOTE
Chronic anemia:  - Hemoglobin at baseline 9.9, normocytic white count 5.08  - Order anemia workup  - No indication for transfusion at this time

## 2023-11-19 NOTE — H&P
[id number] [facility]  H&P  Name: Shanna Mosley 78 y.o. male I MRN: [id number]  Unit/Bed#: 2 [address] Date of Admission: 11/18/2023   Date of Service: 11/19/2023 I Hospital Day: 1      Assessment/Plan   * Cellulitis of left lower extremity  Assessment & Plan  Left lower extremity cellulitis:  - CT LLE wo contrast:   Diffuse subcutaneous soft tissue edema and inflammatory change throughout the distal left lower extremity consistent with cellulitis. .   - was given a dose of IV Ceftriaxone in ER  - Hemodynamically stable  - Continue IV Ancef as ordered. - Obtain wound gram stain and culture  - Consult infectious disease and wound care  - Monitor septic workup, procalcitonin, and lactic acid levels        Acute kidney injury superimposed on chronic kidney disease   Assessment & Plan  Lab Results   Component Value Date    EGFR 25 11/18/2023    EGFR 38 10/24/2023    EGFR 28 08/10/2023    CREATININE 2.29 (H) 11/18/2023    CREATININE 1.76 10/24/2023    CREATININE 2.05 (H) 08/10/2023         - Acute on chronic secondary to infection and decreased oral intake / pre renal   - UA ordered   - IVF and repeat BMP in am - cw flomax and consult renal if no improvement     Thrombocytopenia ([address])  Assessment & Plan  . Chronic Thrombocytopenia with acute drop probably due to infection:   - Platelets at 86, repeat stat to ensure no further drop  - Continue Eliquis for now 2.5 mg po bid - low threshold for cardiology consult  - Investigate potential causes, including MDS, nutritional deficiencies (B12, folate), or hemolysis - work up ordered   - Order hemolysis workup, B12 and folate levels, and hematology consult requested  - Consult cardiology if platelets drop further and Eliquis needs to be held  - Less likely but work up to r/o MAHA ordered.     Type 2 diabetes mellitus [facility])  Assessment & Plan  Lab Results   Component Value Date    HGBA1C 8.3 (H) 02/23/2023       Recent Labs     11/18/23  1802 11/18/23 2130 POCGLU 146* 243*       Blood Sugar Average: Last 72 hrs:  (P) 194.5      4. Diabetes:  - Continue insulin, januvia and glimepiride as ordered  - Monitor blood glucose with sliding scale    Chronic anemia  Assessment & Plan  Chronic anemia:  - Hemoglobin at baseline 9.9, normocytic white count 5.08  - Order anemia workup  - No indication for transfusion at this time    BPH (benign prostatic hyperplasia)  Assessment & Plan   Benign prostatic hyperplasia (BPH):  - Continue Flomax 0.4 mg as ordered    Dyslipidemia  Assessment & Plan  Dyslipidemia:  - Continue statin 40 mg as ordered       Chronic gout  Assessment & Plan  - Hold allopurinol for today, no current exacerbation and renal function is worse       Chronic atrial fibrillation (HCC)  Assessment & Plan   Atrial fibrillation on Eliquis:  - Monitor platelets closely- today 80 K with no evidence of bleeding clinically  - Continue Eliquis 2.5 mg BID  - Last echo in July 2023 showed normal ejection fraction  - Continue torsemide and telemetry monitoring  - Consider Cardiology consult if platelets drop. VTE Pharmacologic Prophylaxis:   Moderate Risk (Score 3-4) - Pharmacological DVT Prophylaxis Ordered: apixaban (Eliquis). Code Status: Level 3 - DNAR and DNI    Discussion with family: Patient declined call to . Anticipated Length of Stay: Patient will be admitted on an inpatient basis with an anticipated length of stay of greater than 2 midnights secondary to LLE cellultis and KARINA on CKD. Total Time Spent on Date of Encounter in care of patient: 45 mins. This time was spent on one or more of the following: performing physical exam; counseling and coordination of care; obtaining or reviewing history; documenting in the medical record; reviewing/ordering tests, medications or procedures; communicating with other healthcare professionals and discussing with patient's family/caregivers.     Chief Complaint:  LLE pain and concern for cellulitis     History of Present Illness:  Brooks Olivo is a 78 y.o. male   Maryjo Cabrales, an 67-year-old male with a past medical history of gout, atrial fibrillation on Eliquis 2.5 mg BID, dyslipidemia, diabetes on glimepiride insulin, and BPH, presents with a chief complaint of a left lower extremity non-healing wound that has been gradually worsening over the past one to two weeks. The patient reports pain around the ankle, and the wound initially started as a blister which eventually worsened and is now a non-healing ulcer. He wears pressure socks for edema in his legs. The patient denies having documented fevers at home but reports increased fatigue and decreased appetite for the past few days. No other complaints of concern were reported upon arrival.    On labs also noted to have chronic anemia and acute on chronic thrombocytopenia with no clinical evidence of bleeding. On eliquis for his A fib 2.5 mg PO BID. Also noted to have KARINA with creatinine of 2.0 which was 1.7 last on 10/24/23. Patient admitted for LLE cellulitis and KARINA and worsening cytopenia. Diagnostic Tests:  1. CT scan of the left lower extremity: Diffuse subcutaneous soft tissue edema and inflammatory change throughout distal left lower extremity consistent with cellulitis, no underlying abscess or air. 2. Laboratory results: Procalcitonin 0.07, white blood cell count 5.08, hemoglobin 9.9, MCV 94, platelets 86.  3. Wound gram stain and culture: Pending. 4. Septic workup: Ordered. Review of Systems:  Review of Systems   Constitutional:  Positive for appetite change and fatigue. Musculoskeletal:         LLE pain and wound/ ulcer    All other systems reviewed and are negative.       Past Medical and Surgical History:   Past Medical History:   Diagnosis Date    Atrial fibrillation ([address])     Chronic kidney disease     Coronary artery disease     Diabetes mellitus ([address])     Hyperlipidemia     Hypertension        Past Surgical History:   Procedure Laterality Date    CARDIAC CATHETERIZATION N/A 6/30/2023    Procedure: Cardiac pericardiocentesis; Surgeon: Raoul Gunderson DO;  Location: [facility]; Service: Cardiology    CARDIAC CATHETERIZATION N/A 6/30/2023    Procedure: Cardiac RHC; Surgeon: Raoul Gunderson DO;  Location: [facility]; Service: Cardiology    EYE SURGERY      IR CHEST TUBE PLACEMENT  6/24/2023    IR CHEST TUBE PLACEMENT  7/28/2023    IR PLEURAL EFFUSION LONG-TERM CATHETER PLACEMENT  8/7/2023    SKIN CANCER EXCISION      TONSILLECTOMY         Meds/Allergies:  Prior to Admission medications    Medication Sig Start Date End Date Taking? Authorizing Provider   ascorbic acid (VITAMIN C) 500 MG tablet Take 1 tablet (500 mg total) by mouth daily 1/8/21  Yes Albino Al MD   atorvastatin (LIPITOR) 40 mg tablet Take 1 tablet (40 mg total) by mouth daily with dinner 1/16/19  Yes Deidre Shearer DO   Eliquis 2.5 MG TAKE ONE TABLET BY MOUTH TWICE A DAY 7/10/23  Yes Cristi Cedeno MD   ferrous sulfate 325 (65 Fe) mg tablet Take 325 mg by mouth daily with breakfast   Yes Historical Provider, MD   glimepiride (AMARYL) 2 mg tablet Take 1 tablet (2 mg total) by mouth daily with dinner 2/27/23  Yes Albino Al MD   glimepiride (AMARYL) 4 mg tablet Take 1 tablet (4 mg total) by mouth daily with breakfast 2/27/23  Yes Albino Al MD   insulin glargine (LANTUS) 100 units/mL subcutaneous injection Inject 5 Units under the skin daily at bedtime 8/10/23  Yes Moura CORRIE Claros   latanoprost (XALATAN) 0.005 % ophthalmic solution 1 drop daily at bedtime   Yes Historical Provider, MD   sitaGLIPtin (JANUVIA) 25 mg tablet Take 1 tablet (25 mg total) by mouth daily Do not start before February 28, 2023.   Patient taking differently: Take 50 mg by mouth daily 2/28/23  Yes Albino Al MD   tamsulosin (FLOMAX) 0.4 mg Take 0.4 mg by mouth daily with dinner   Yes Historical Provider, MD   timolol (TIMOPTIC) 0.5 % ophthalmic solution Administer 1 drop to both eyes daily 1/7/21  Yes Dipika Campbell MD   ZINC OXIDE PO Take by mouth daily   Yes Historical Provider, MD   allopurinol (ZYLOPRIM) 100 mg tablet Take 100 mg by mouth 2 (two) times a day    Historical Provider, MD   ALPRAZolam Usha Marts) 0.5 mg tablet 0.5 mg daily at bedtime as needed for anxiety or sleep 3/12/18   Historical Provider, MD   Blood Pressure Monitor NILTON by Does not apply route daily 2/19/20   Lizette Lujan MD   cholecalciferol (VITAMIN D3) 1,000 units tablet Take 2 tablets (2,000 Units total) by mouth daily Do not start before July 12, 2023. 7/12/23 10/27/23  César Maldonado MD   docusate sodium (COLACE) 100 mg capsule Take 1 capsule (100 mg total) by mouth 2 (two) times a day as needed for constipation  Patient not taking: Reported on 10/25/2023 8/10/23   Lon Meyers PA-C   torsemide 40 MG TABS Take 40 mg by mouth daily Do not start before July 12, 2023. Patient taking differently: Take 80 mg by mouth daily 7/12/23 10/27/23  César Maldonado MD   pantoprazole (PROTONIX) 40 mg tablet Take 1 tablet (40 mg total) by mouth daily 7/11/23 11/18/23  César Maldonado MD     I have reviewed home medications using recent Epic encounter. Allergies: Allergies   Allergen Reactions    Elemental Sulfur     Sulfa Antibiotics        Social History:  Marital Status:        Patient Pre-hospital Living Situation: Home  Patient Pre-hospital Level of Mobility: unable to be assessed at time of evaluation    Substance Use History:   Social History     Substance and Sexual Activity   Alcohol Use Not Currently    Alcohol/week: 0.0 standard drinks of alcohol    Comment: special occasions     Social History     Tobacco Use   Smoking Status Former   Smokeless Tobacco Former     Social History     Substance and Sexual Activity   Drug Use Not Currently       Family History:  Family History   Problem Relation Age of Onset    Heart disease Mother     Diabetes Father     Vision loss Father     Cancer Sister     Diabetes Sister        Physical Exam:     Vitals:   Blood Pressure: 145/71 (11/18/23 2312)  Pulse: 61 (11/18/23 2312)  Temperature: (!) 96.8 °F (36 °C) (11/18/23 2312)  Temp Source: Temporal (11/18/23 2312)  Respirations: 17 (11/18/23 2312)  Height: 5' 9" (175.3 cm) (11/18/23 2337)  Weight - Scale: 70.1 kg (154 lb 9.6 oz) (11/18/23 2337)  SpO2: 98 % (11/18/23 2312)    Physical Exam  Constitutional:       Appearance: Normal appearance. HENT:      Head: Normocephalic and atraumatic. Mouth/Throat:      Mouth: Mucous membranes are dry. Eyes:      Extraocular Movements: Extraocular movements intact. Pupils: Pupils are equal, round, and reactive to light. Cardiovascular:      Rate and Rhythm: Normal rate and regular rhythm. Pulses: Normal pulses. Heart sounds: Normal heart sounds. Pulmonary:      Effort: Pulmonary effort is normal.      Breath sounds: Normal breath sounds. Abdominal:      General: Abdomen is flat. Bowel sounds are normal.      Palpations: Abdomen is soft. Musculoskeletal:         General: Normal range of motion. Cervical back: Normal range of motion and neck supple. Skin:     General: Skin is warm. Neurological:      General: No focal deficit present. Mental Status: He is alert and oriented to person, place, and time. Mental status is at baseline.           Additional Data:     Lab Results:  Results from last 7 days   Lab Units 11/18/23  1617   WBC Thousand/uL 5.08   HEMOGLOBIN g/dL 9.9*   HEMATOCRIT % 31.4*   PLATELETS Thousands/uL 86*   NEUTROS PCT % 67   LYMPHS PCT % 18   MONOS PCT % 14*   EOS PCT % 1     Results from last 7 days   Lab Units 11/18/23  1617   SODIUM mmol/L 132*   POTASSIUM mmol/L 4.3   CHLORIDE mmol/L 93*   CO2 mmol/L 32   BUN mg/dL 81*   CREATININE mg/dL 2.29*   ANION GAP mmol/L 7   CALCIUM mg/dL 8.6   ALBUMIN g/dL 3.5   TOTAL BILIRUBIN mg/dL 0.69   ALK PHOS U/L 134*   ALT U/L 14   AST U/L 15   GLUCOSE RANDOM mg/dL 132         Results from last 7 days   Lab Units 11/18/23  2130 11/18/23  1802   POC GLUCOSE mg/dl 243* 146*         Results from last 7 days   Lab Units 11/18/23  1617   PROCALCITONIN ng/ml 0.07       Lines/Drains:  Invasive Devices       Peripheral Intravenous Line  Duration             Peripheral IV 11/18/23 Left;Ventral (anterior) Forearm <1 day              Drain  Duration             Pleural Effusion Long-Term Catheter 16 Fr. 103 days                        Imaging: Reviewed radiology reports from this admission including: CT LLE   CT lower extremity wo contrast left   Final Result by Jovita Herndon MD (11/18 2358)      Diffuse subcutaneous soft tissue edema and inflammatory change throughout the distal left lower extremity consistent with cellulitis. .         Workstation performed: [id number]         XR tibia fibula 2 views LEFT    (Results Pending)   XR ankle 3+ views LEFT    (Results Pending)       EKG and Other Studies Reviewed on Admission:   EKG: NSR. HR 93.    ** Please Note: This note has been constructed using a voice recognition system.  **

## 2023-11-19 NOTE — ASSESSMENT & PLAN NOTE
Atrial fibrillation on Eliquis:  - Monitor platelets closely- today 80 K with no evidence of bleeding clinically  - Continue Eliquis 2.5 mg BID  - Last echo in July 2023 showed normal ejection fraction  - Continue torsemide and telemetry monitoring  - Consider Cardiology consult if platelets drop.

## 2023-11-19 NOTE — ASSESSMENT & PLAN NOTE
Left lower extremity cellulitis:  - CT LLE -diffuse subcutaneous soft tissue edema/ inflammatory change throughout the distal left lower extremity consistent with cellulitis. .   - was given a dose of IV Ceftriaxone in ED  - Increased dose of  IV Ancef to 2 gm

## 2023-11-19 NOTE — ASSESSMENT & PLAN NOTE
Lab Results   Component Value Date    HGBA1C 8.3 (H) 02/23/2023       Recent Labs     11/19/23  0726 11/19/23  0733 11/19/23  1124 11/19/23  1619   POCGLU 89 99 110 118       Continue SSI.  Hold po meds januvia and glimepiride   Update A1C

## 2023-11-19 NOTE — ASSESSMENT & PLAN NOTE
. Chronic Thrombocytopenia  - Platelets low but acceptable  - Continue Eliquis for now 2.5 mg po bid   Rpt labs in AM

## 2023-11-19 NOTE — ASSESSMENT & PLAN NOTE
Lab Results   Component Value Date    HGBA1C 8.3 (H) 02/23/2023       Recent Labs     11/18/23  1802 11/18/23  2130   POCGLU 146* 243*       Blood Sugar Average: Last 72 hrs:  (P) 194.5      4.  Diabetes:  - Continue insulin, januvia and glimepiride as ordered  - Monitor blood glucose with sliding scale

## 2023-11-19 NOTE — PROGRESS NOTES
[id number] [facility]  Progress Note  Name: Sherryl Runner  MRN: [id number]  Unit/Bed#: 2 [place] [address] I Date of Admission: 11/18/2023   Date of Service: 11/19/2023 I Hospital Day: 1    Assessment/Plan   * Cellulitis of left lower extremity  Assessment & Plan  Left lower extremity cellulitis:  - CT LLE -diffuse subcutaneous soft tissue edema/ inflammatory change throughout the distal left lower extremity consistent with cellulitis. .   - was given a dose of IV Ceftriaxone in ED  - Increased dose of  IV Ancef to 2 gm        Chronic kidney disease, stage 4 (severe) [facility])  Assessment & Plan  Lab Results   Component Value Date    EGFR 28 11/19/2023    EGFR 25 11/18/2023    EGFR 38 10/24/2023    CREATININE 2.09 (H) 11/19/2023    CREATININE 2.29 (H) 11/18/2023    CREATININE 1.76 10/24/2023     Per outpatient nephrology note baseline creatinine is 2.5-3  Creatinine is currently at baseline. Discontinue IV fluids  Repeat lab work in a.m. Thrombocytopenia ([address])  Assessment & Plan  .  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)  Assessment & Plan  EF of 40%  Restart Demadex       Chronic anemia  Assessment & Plan  Chronic anemia:  - Hemoglobin stable  - rpt labwork in AM    Results from last 7 days   Lab Units 11/19/23  0556 11/19/23  0310 11/18/23  1617   HEMOGLOBIN g/dL 9.6* 9.7* 9.9*   HEMATOCRIT % 30.6* 31.0* 31.4*   MCV fL 94 95 94        Chronic atrial fibrillation (HCC)  Assessment & Plan   Atrial fibrillation on Eliquis  - Continue Eliquis 2.5 mg BID    Type 2 diabetes mellitus ([address])  Assessment & Plan  Lab Results   Component Value Date    HGBA1C 8.3 (H) 02/23/2023       Recent Labs 23  0726 23  0733 23  1124 23  1619   POCGLU 89 99 110 118       Continue SSI. Hold po meds januvia and glimepiride   Update A1C               VTE Pharmacologic Prophylaxis:    eliquis    Mobility:   Basic Mobility Inpatient Raw Score: 16  JH-HLM Goal: 5: Stand one or more mins  JH-HLM Achieved: 4: Move to chair/commode  HLM Goal NOT achieved. Continue with multidisciplinary rounding and encourage appropriate mobility to improve upon [facility] goals. Patient Centered Rounds: I performed bedside rounds with nursing staff today. Discussions with Specialists or Other Care Team Provider: yes     Education and Discussions with Family / Patient: Updated  (son) at bedside. Total Time Spent on Date of Encounter in care of patient: This time was spent on one or more of the following: performing physical exam; counseling and coordination of care; obtaining or reviewing history; documenting in the medical record; reviewing/ordering tests, medications or procedures; communicating with other healthcare professionals and discussing with patient's family/caregivers. Current Length of Stay: 1 day(s)  Current Patient Status: Inpatient   Certification Statement: The patient will continue to require additional inpatient hospital stay due to cellulitis  Discharge Plan: Anticipate discharge in 48-72 hrs to discharge location to be determined pending rehab evaluations. Code Status: Level 3 - DNAR and DNI    Subjective:     Patient denies left lower extremity pain today. Denies any shortness of breath    Objective:     Vitals:   Temp (24hrs), Av.7 °F (35.4 °C), Min:93 °F (33.9 °C), Max:96.8 °F (36 °C)    Temp:  [93 °F (33.9 °C)-96.8 °F (36 °C)] 95.5 °F (35.3 °C)  HR:  [61-80] 71  Resp:  [17-20] 18  BP: (122-152)/(62-86) 151/76  SpO2:  [98 %-100 %] 99 %  Body mass index is 22.83 kg/m². Input and Output Summary (last 24 hours):      Intake/Output Summary (Last 24 hours) at 2023 [address] filed at 11/19/2023 1801  Gross per 24 hour   Intake 430 ml   Output 2050 ml   Net -1620 ml       Physical Exam:   Physical Exam  Vitals reviewed. Constitutional:       General: He is not in acute distress. Appearance: He is not toxic-appearing. HENT:      Head: Normocephalic and atraumatic. Eyes:      General:         Right eye: No discharge. Left eye: No discharge. Cardiovascular:      Rate and Rhythm: Normal rate. Rhythm irregular. Pulmonary:      Effort: Pulmonary effort is normal. No respiratory distress. Breath sounds: No wheezing or rales. Comments: Decreased breath sounds bilaterally  Abdominal:      General: Bowel sounds are normal. There is no distension. Palpations: Abdomen is soft. Tenderness: There is no abdominal tenderness. Musculoskeletal:      Comments: Bilateral lower extremity edema -close to baseline per son. Lower extremity with dressing in place. Erythema noted with some warmth   Neurological:      Mental Status: He is alert and oriented to person, place, and time.           Additional Data:     Labs:  Results from last 7 days   Lab Units 11/19/23  0556   WBC Thousand/uL 3.81*   HEMOGLOBIN g/dL 9.6*   HEMATOCRIT % 30.6*   PLATELETS Thousands/uL 85*   NEUTROS PCT % 65   LYMPHS PCT % 17   MONOS PCT % 16*   EOS PCT % 2     Results from last 7 days   Lab Units 11/19/23  0556   SODIUM mmol/L 137   POTASSIUM mmol/L 3.6   CHLORIDE mmol/L 99   CO2 mmol/L 32   BUN mg/dL 76*   CREATININE mg/dL 2.09*   ANION GAP mmol/L 6   CALCIUM mg/dL 8.2*   ALBUMIN g/dL 3.2*   TOTAL BILIRUBIN mg/dL 0.57   ALK PHOS U/L 123*   ALT U/L 13   AST U/L 14   GLUCOSE RANDOM mg/dL 107     Results from last 7 days   Lab Units 11/19/23  0556   INR  1.53*     Results from last 7 days   Lab Units 11/19/23  1619 11/19/23  1124 11/19/23  0733 11/19/23  0726 11/18/23  2130 11/18/23  1802   POC GLUCOSE mg/dl 118 110 99 89 243* 146*         Results from last 7 days   Lab Units 11/18/23  1617   PROCALCITONIN ng/ml 0.07       Lines/Drains:  Invasive Devices       Peripheral Intravenous Line  Duration             Peripheral IV 11/18/23 Left;Ventral (anterior) Forearm 1 day              Drain  Duration             Pleural Effusion Long-Term Catheter 16 Fr. 104 days                    Imaging: Reviewed radiology reports from this admission including: cT Left LE    Recent Cultures (last 7 days):   Results from last 7 days   Lab Units 11/18/23  1617   BLOOD CULTURE  Received in Microbiology Lab. Culture in Progress. Received in Microbiology Lab. Culture in Progress.        Last 24 Hours Medication List:   Current Facility-Administered Medications   Medication Dose Route Frequency Provider Last Rate    acetaminophen  650 mg Oral Q6H PRN Kwesi Lozano MD      allopurinol  100 mg Oral BID Kwesi Lozano MD      ALPRAZolam  0.5 mg Oral HS PRN Kwesi Lozano MD      aluminum-magnesium hydroxide-simethicone  30 mL Oral Q6H PRN Kwesi Lozano MD      apixaban  2.5 mg Oral BID Kwesi Lozano MD      atorvastatin  40 mg Oral Daily With Riana Rivera MD      cefazolin  2,000 mg Intravenous Q12H Phoebe Perez, DO 2,000 mg (11/19/23 0947)    ferrous sulfate  325 mg Oral Daily With Breakfast Kwesi Lozano MD      insulin glargine  5 Units Subcutaneous HS Kwesi Lozano MD      insulin lispro  1-5 Units Subcutaneous TID Bonita Hodges MD      insulin lispro  1-5 Units Subcutaneous HS Kwesi Lozano MD      latanoprost  1 drop Both Eyes HS Kwesi Lozano MD      magnesium hydroxide  30 mL Oral Daily PRN Kwesi Lozano MD      ondansetron  4 mg Intravenous Q6H PRN Kwesi Lozano MD      tamsulosin  0.4 mg Oral Daily With Riana Rivera MD      timolol  1 drop Both Eyes Daily Kwesi Lozano MD      torsemide  80 mg Oral Daily Phoebe Revankar, DO      zinc sulfate  220 mg Oral Daily Kwesi Lozano MD          Today, Patient Was Seen By: Traci Hernandez DO    **Please Note: This note may have been constructed using a voice recognition system. **

## 2023-11-19 NOTE — ASSESSMENT & PLAN NOTE
Lab Results   Component Value Date    EGFR 28 11/19/2023    EGFR 25 11/18/2023    EGFR 38 10/24/2023    CREATININE 2.09 (H) 11/19/2023    CREATININE 2.29 (H) 11/18/2023    CREATININE 1.76 10/24/2023     Per outpatient nephrology note baseline creatinine is 2.5-3  Creatinine is currently at baseline. Discontinue IV fluids  Repeat lab work in a.m.

## 2023-11-20 PROBLEM — M25.572 LEFT ANKLE PAIN: Status: ACTIVE | Noted: 2023-11-20

## 2023-11-20 LAB
ALBUMIN SERPL BCP-MCNC: 3.3 G/DL (ref 3.5–5)
ALP SERPL-CCNC: 121 U/L (ref 34–104)
ALT SERPL W P-5'-P-CCNC: 9 U/L (ref 7–52)
ANION GAP SERPL CALCULATED.3IONS-SCNC: 6 MMOL/L
AST SERPL W P-5'-P-CCNC: 14 U/L (ref 13–39)
BILIRUB SERPL-MCNC: 0.48 MG/DL (ref 0.2–1)
BUN SERPL-MCNC: 66 MG/DL (ref 5–25)
CALCIUM ALBUM COR SERPL-MCNC: 8.9 MG/DL (ref 8.3–10.1)
CALCIUM SERPL-MCNC: 8.3 MG/DL (ref 8.4–10.2)
CHLORIDE SERPL-SCNC: 99 MMOL/L (ref 96–108)
CO2 SERPL-SCNC: 32 MMOL/L (ref 21–32)
CREAT SERPL-MCNC: 2.14 MG/DL (ref 0.6–1.3)
ERYTHROCYTE [DISTWIDTH] IN BLOOD BY AUTOMATED COUNT: 18.5 % (ref 11.6–15.1)
GFR SERPL CREATININE-BSD FRML MDRD: 27 ML/MIN/1.73SQ M
GLUCOSE SERPL-MCNC: 121 MG/DL (ref 65–140)
GLUCOSE SERPL-MCNC: 155 MG/DL (ref 65–140)
GLUCOSE SERPL-MCNC: 161 MG/DL (ref 65–140)
GLUCOSE SERPL-MCNC: 82 MG/DL (ref 65–140)
GLUCOSE SERPL-MCNC: 83 MG/DL (ref 65–140)
HCT VFR BLD AUTO: 30.5 % (ref 36.5–49.3)
HGB BLD-MCNC: 9.6 G/DL (ref 12–17)
MCH RBC QN AUTO: 30 PG (ref 26.8–34.3)
MCHC RBC AUTO-ENTMCNC: 31.5 G/DL (ref 31.4–37.4)
MCV RBC AUTO: 95 FL (ref 82–98)
PLATELET # BLD AUTO: 81 THOUSANDS/UL (ref 149–390)
PMV BLD AUTO: 10 FL (ref 8.9–12.7)
POTASSIUM SERPL-SCNC: 3.9 MMOL/L (ref 3.5–5.3)
PROT SERPL-MCNC: 5.8 G/DL (ref 6.4–8.4)
RBC # BLD AUTO: 3.2 MILLION/UL (ref 3.88–5.62)
SODIUM SERPL-SCNC: 137 MMOL/L (ref 135–147)
URATE SERPL-MCNC: 4.2 MG/DL (ref 3.5–8.5)
WBC # BLD AUTO: 4.1 THOUSAND/UL (ref 4.31–10.16)

## 2023-11-20 PROCEDURE — 83036 HEMOGLOBIN GLYCOSYLATED A1C: CPT | Performed by: FAMILY MEDICINE

## 2023-11-20 PROCEDURE — 82948 REAGENT STRIP/BLOOD GLUCOSE: CPT

## 2023-11-20 PROCEDURE — 99233 SBSQ HOSP IP/OBS HIGH 50: CPT | Performed by: FAMILY MEDICINE

## 2023-11-20 PROCEDURE — 97163 PT EVAL HIGH COMPLEX 45 MIN: CPT

## 2023-11-20 PROCEDURE — 99223 1ST HOSP IP/OBS HIGH 75: CPT | Performed by: INTERNAL MEDICINE

## 2023-11-20 PROCEDURE — 84550 ASSAY OF BLOOD/URIC ACID: CPT | Performed by: FAMILY MEDICINE

## 2023-11-20 PROCEDURE — 97110 THERAPEUTIC EXERCISES: CPT

## 2023-11-20 PROCEDURE — 80053 COMPREHEN METABOLIC PANEL: CPT | Performed by: FAMILY MEDICINE

## 2023-11-20 PROCEDURE — 85027 COMPLETE CBC AUTOMATED: CPT | Performed by: FAMILY MEDICINE

## 2023-11-20 RX ORDER — POLYETHYLENE GLYCOL 3350 17 G/17G
17 POWDER, FOR SOLUTION ORAL 2 TIMES DAILY
Status: DISCONTINUED | OUTPATIENT
Start: 2023-11-20 | End: 2023-11-22 | Stop reason: HOSPADM

## 2023-11-20 RX ORDER — DOCUSATE SODIUM 100 MG/1
100 CAPSULE, LIQUID FILLED ORAL 2 TIMES DAILY
Status: DISCONTINUED | OUTPATIENT
Start: 2023-11-20 | End: 2023-11-22 | Stop reason: HOSPADM

## 2023-11-20 RX ADMIN — POLYETHYLENE GLYCOL 3350 17 G: 17 POWDER, FOR SOLUTION ORAL at 19:36

## 2023-11-20 RX ADMIN — TIMOLOL MALEATE 1 DROP: 5 SOLUTION/ DROPS OPHTHALMIC at 08:30

## 2023-11-20 RX ADMIN — TORSEMIDE 80 MG: 20 TABLET ORAL at 08:30

## 2023-11-20 RX ADMIN — TAMSULOSIN HYDROCHLORIDE 0.4 MG: 0.4 CAPSULE ORAL at 16:57

## 2023-11-20 RX ADMIN — ALLOPURINOL 100 MG: 100 TABLET ORAL at 08:30

## 2023-11-20 RX ADMIN — INSULIN LISPRO 1 UNITS: 100 INJECTION, SOLUTION INTRAVENOUS; SUBCUTANEOUS at 23:38

## 2023-11-20 RX ADMIN — INSULIN LISPRO 1 UNITS: 100 INJECTION, SOLUTION INTRAVENOUS; SUBCUTANEOUS at 11:29

## 2023-11-20 RX ADMIN — ACETAMINOPHEN 650 MG: 325 TABLET ORAL at 15:45

## 2023-11-20 RX ADMIN — ALLOPURINOL 100 MG: 100 TABLET ORAL at 17:00

## 2023-11-20 RX ADMIN — DOCUSATE SODIUM 100 MG: 100 CAPSULE, LIQUID FILLED ORAL at 19:36

## 2023-11-20 RX ADMIN — FERROUS SULFATE TAB 325 MG (65 MG ELEMENTAL FE) 325 MG: 325 (65 FE) TAB at 07:52

## 2023-11-20 RX ADMIN — LATANOPROST 1 DROP: 50 SOLUTION OPHTHALMIC at 23:21

## 2023-11-20 RX ADMIN — CEFAZOLIN SODIUM 2000 MG: 2 SOLUTION INTRAVENOUS at 20:49

## 2023-11-20 RX ADMIN — ATORVASTATIN CALCIUM 40 MG: 40 TABLET, FILM COATED ORAL at 16:57

## 2023-11-20 RX ADMIN — CEFAZOLIN SODIUM 2000 MG: 2 SOLUTION INTRAVENOUS at 08:30

## 2023-11-20 RX ADMIN — APIXABAN 2.5 MG: 2.5 TABLET, FILM COATED ORAL at 17:00

## 2023-11-20 RX ADMIN — INSULIN GLARGINE 5 UNITS: 100 INJECTION, SOLUTION SUBCUTANEOUS at 23:23

## 2023-11-20 RX ADMIN — ZINC SULFATE 220 MG (50 MG) CAPSULE 220 MG: CAPSULE at 08:30

## 2023-11-20 RX ADMIN — APIXABAN 2.5 MG: 2.5 TABLET, FILM COATED ORAL at 08:30

## 2023-11-20 NOTE — PROGRESS NOTES
[address]  Progress Note  Name: Paul Bear  MRN: [id number]  Unit/Bed#: 2 [address] Date of Admission: 11/18/2023   Date of Service: 11/20/2023 I Hospital Day: 2    Assessment/Plan   * Cellulitis of left lower extremity  Assessment & Plan  Left lower extremity cellulitis:  - CT LLE -diffuse subcutaneous soft tissue edema/ inflammatory change throughout the distal left lower extremity consistent with cellulitis. .   - was given a dose of IV Ceftriaxone in ED  - Continue high dose of IV Ancef 2 gm Q 12 hrs (renally dosed)        Left ankle pain  Assessment & Plan  Per son when patient was working with therapy most of his pain seems to be localized to the left ankle  Patient does have history of gout and reported that the pain feels similar  Check uric acid level although in the setting of an acute gout flare up, levels can be low or normal  X-ray of the left ankle area showed mild diffuse soft tissue swelling of the lower leg  If continues to have pain tomorrow despite being on antibiotics, can consider prednisone for possible gout    Thrombocytopenia (HCC)  Assessment & Plan  Patient with thrombocytopenia, anemia and also with some leukopenia  -Counts are low but acceptable  -Patient seen by hematology and patient to follow-up after discharge for further evaluation including possible bone marrow biopsy  - Continue Eliquis for now 2.5 mg po bid   Rpt labs in AM        Chronic kidney disease, stage 4 (severe) [facility])  Assessment & Plan  Lab Results   Component Value Date    EGFR 27 11/20/2023    EGFR 28 11/19/2023    EGFR 25 11/18/2023    CREATININE 2.14 (H) 11/20/2023    CREATININE 2.09 (H) 11/19/2023    CREATININE 2.29 (H) 11/18/2023     Per outpatient nephrology note baseline creatinine is 2.5-3  Creatinine continues to remain at baseline.   Off IV fluids  Repeat lab work in a.m.    BPH (benign prostatic hyperplasia)  Assessment & Plan  Continue Flomax 0.4 mg    Dyslipidemia  Assessment & Plan  Continue statin 40 mg       Chronic combined systolic and diastolic CHF (congestive heart failure) (HCC)  Assessment & Plan  EF of 40%  Continue Demadex. Chronic anemia  Assessment & Plan  Chronic anemia:  - Hemoglobin remains stable  - rpt labwork in AM    Results from last 7 days   Lab Units 11/20/23  0629 11/19/23  0556 11/19/23  0310 11/18/23  1617   HEMOGLOBIN g/dL 9.6* 9.6* 9.7* 9.9*   HEMATOCRIT % 30.5* 30.6* 31.0* 31.4*   MCV fL 95 94 95 94          Chronic atrial fibrillation (HCC)  Assessment & Plan  continue Eliquis 2.5 mg BID    Type 2 diabetes mellitus [facility])  Assessment & Plan  Lab Results   Component Value Date    HGBA1C 8.3 (H) 02/23/2023       Recent Labs     11/19/23  1619 11/19/23  2050 11/20/23  0656 11/20/23  1104   POCGLU 118 196* 83 161*     Continue SSI, lantus. Blood sugar stable. Holding po meds januvia and glimepiride   Update A1C               VTE Pharmacologic Prophylaxis:    eliquis    Mobility:   Basic Mobility Inpatient Raw Score: 17  JH-HLM Goal: 5: Stand one or more mins  JH-HLM Achieved: 6: Walk 10 steps or more  HLM Goal achieved. Continue to encourage appropriate mobility. Patient Centered Rounds: I performed bedside rounds with nursing staff today. Discussions with Specialists or Other Care Team Provider: yes    Education and Discussions with Family / Patient: Updated  (son) via phone. Total Time Spent on Date of Encounter in care of patient: This time was spent on one or more of the following: performing physical exam; counseling and coordination of care; obtaining or reviewing history; documenting in the medical record; reviewing/ordering tests, medications or procedures; communicating with other healthcare professionals and discussing with patient's family/caregivers.     Current Length of Stay: 2 day(s)  Current Patient Status: Inpatient   Certification Statement: The patient will continue to require additional inpatient hospital stay due to left lower extremity cellulitis requiring IV antibiotics  Discharge Plan: Anticipate discharge in 24-48 hrs to discharge location to be determined pending rehab evaluations. Code Status: Level 3 - DNAR and DNI    Subjective:     Patient denies any shortness of breath. Still with some intermittent pain in the left lower leg. Per son when patient got up with therapy most of his pain seem to be localized to the left ankle    Objective:     Vitals:   Temp (24hrs), Av.4 °F (35.8 °C), Min:95.5 °F (35.3 °C), Max:96.8 °F (36 °C)    Temp:  [95.5 °F (35.3 °C)-96.8 °F (36 °C)] 96.8 °F (36 °C)  HR:  [69-73] 69  Resp:  [18-20] 20  BP: (144-151)/(75-76) 145/76  SpO2:  [98 %-99 %] 99 %  Body mass index is 22.83 kg/m². Input and Output Summary (last 24 hours): Intake/Output Summary (Last 24 hours) at 2023 1324  Last data filed at 2023 0830  Gross per 24 hour   Intake 330 ml   Output 1850 ml   Net -1520 ml       Physical Exam:   Physical Exam  Vitals reviewed. Constitutional:       General: He is not in acute distress. Appearance: He is not ill-appearing. HENT:      Head: Normocephalic and atraumatic. Eyes:      General:         Right eye: No discharge. Left eye: No discharge. Cardiovascular:      Rate and Rhythm: Normal rate. Rhythm irregular. Pulmonary:      Effort: Pulmonary effort is normal. No respiratory distress. Breath sounds: Normal breath sounds. No wheezing or rales. Abdominal:      General: Bowel sounds are normal. There is no distension. Palpations: Abdomen is soft. Tenderness: There is no abdominal tenderness. Musculoskeletal:      Right lower leg: Edema present. Left lower leg: Edema present. Comments: Left lower extremity with dressing in place with lower extremity erythema   Neurological:      Mental Status: He is alert.          Additional Data:     Labs:  Results from last 7 days   Lab Units 23  0629 23  0556   WBC Thousand/uL 4.10* 3.81*   HEMOGLOBIN g/dL 9.6* 9.6*   HEMATOCRIT % 30.5* 30.6*   PLATELETS Thousands/uL 81* 85*   NEUTROS PCT %  --  65   LYMPHS PCT %  --  17   MONOS PCT %  --  16*   EOS PCT %  --  2     Results from last 7 days   Lab Units 11/20/23  0629   SODIUM mmol/L 137   POTASSIUM mmol/L 3.9   CHLORIDE mmol/L 99   CO2 mmol/L 32   BUN mg/dL 66*   CREATININE mg/dL 2.14*   ANION GAP mmol/L 6   CALCIUM mg/dL 8.3*   ALBUMIN g/dL 3.3*   TOTAL BILIRUBIN mg/dL 0.48   ALK PHOS U/L 121*   ALT U/L 9   AST U/L 14   GLUCOSE RANDOM mg/dL 82     Results from last 7 days   Lab Units 11/19/23  0556   INR  1.53*     Results from last 7 days   Lab Units 11/20/23  1104 11/20/23  0656 11/19/23  2050 11/19/23  1619 11/19/23  1124 11/19/23  0733 11/19/23  0726 11/18/23  2130 11/18/23  1802   POC GLUCOSE mg/dl 161* 83 196* 118 110 99 89 243* 146*         Results from last 7 days   Lab Units 11/18/23  1617   PROCALCITONIN ng/ml 0.07       Lines/Drains:  Invasive Devices       Peripheral Intravenous Line  Duration             Peripheral IV 11/18/23 Left;Ventral (anterior) Forearm 1 day              Drain  Duration             Pleural Effusion Long-Term Catheter 16 Fr. 104 days                          Imaging: Reviewed radiology reports from this admission including: CT Left LE    Recent Cultures (last 7 days):   Results from last 7 days   Lab Units 11/18/23  2139 11/18/23  1617   BLOOD CULTURE   --  No Growth at 24 hrs. No Growth at 24 hrs. GRAM STAIN RESULT  No Polys or Bacteria seen  --    WOUND CULTURE  Culture results to follow.   --        Last 24 Hours Medication List:   Current Facility-Administered Medications   Medication Dose Route Frequency Provider Last Rate    acetaminophen  650 mg Oral Q6H PRN Stephen Hoang MD      allopurinol  100 mg Oral BID Stephen Hoang MD      ALPRAZolam  0.5 mg Oral HS PRN Stephen Hoang MD      aluminum-magnesium hydroxide-simethicone  30 mL Oral Q6H PRN Stephen Hoang MD      apixaban  2.5 mg Oral BID Franklin Woods MD      atorvastatin  40 mg Oral Daily With Nikolai Herrera MD      cefazolin  2,000 mg Intravenous Q12H Phoebe Revharmonyar, DO 2,000 mg (11/20/23 0830)    ferrous sulfate  325 mg Oral Daily With Breakfast Franklin Woods MD      insulin glargine  5 Units Subcutaneous HS Franklin Woods MD      insulin lispro  1-5 Units Subcutaneous TID Perez Luke MD      insulin lispro  1-5 Units Subcutaneous HS Franklin Woods MD      latanoprost  1 drop Both Eyes HS Franklin Woods MD      magnesium hydroxide  30 mL Oral Daily PRN Franklin Woods MD      ondansetron  4 mg Intravenous Q6H PRN Franklin Woods MD      tamsulosin  0.4 mg Oral Daily With Nikolai Herrera MD      timolol  1 drop Both Eyes Daily Franklin Woods MD      torsemide  80 mg Oral Daily Phoebe Perez, DO      zinc sulfate  220 mg Oral Daily Franklin Woods MD          Today, Patient Was Seen By: Rush Mandel DO    **Please Note: This note may have been constructed using a voice recognition system. **

## 2023-11-20 NOTE — WOUND OSTOMY CARE
Progress Note - Wound   Emmanuel Marcelino 78 y.o. male MRN: [id number]  Unit/Bed#: [address] [address] Encounter: [id number]      Assessment: This is an 78year old male patient admitted on 11/18/23 with cellulitis of left lower extremity. He has a history of atrial fibrillation on Eliquis, CKD, CAD, DM and HTN. He was AAO to person/place and agreeable to have wound visit done. He is incontinent of urine with no record of BM. He was transferred from reclining chair to bed and repositioned with assist of 2 persons. Assessment Findings:  1-Full thickness wound to left anterior lower leg with red/pink granulation tissue and white slough which had started as a blister. Patient was taking Eliquis 2.5 mg BID on admission. Orders in place for wound care. If patient not discharged to rehab, would benefit from VNA for dressing changes and from outpatient wound care @ [facility] - Case Management notified. 2-Sacrobuttocks intact with discoloration to intergluteal fold due to moisture - orders in place for skin care and for prevention. 3-Bilateral heels intact - orders in place for skin care and for prevention. Plan:   Skin care plans:  1-Cleanse wound to left anterior lower leg with NSS & pat dry. Apply Adaptic to wound bed cut to size of wound then apply Maxorb Ag+ silver alginate cut to size of wound. Cover with ABD, cari & tape. Change daily & prn soilage/dislodgement. 2-Hydraguard to bilateral sacrum, buttock and heels BID and PRN  3-Float heels on 2 pillows to offload pressure so heels are not in contact with mattress or pillows. 4-Ehob pressure redistribution cushion in chair when out of bed. Limit prolonged sitting. 5-Moisturize skin daily with skin nourishing cream.  6-Turn/reposition q2h or when medically stable for pressure re-distribution on skin. Wound 07/27/23 MASD Buttocks Medial (Active)   Wound Image   11/20/23 1052   Wound Description Intact; Epithelialization 11/20/23 1052   Wound Length (cm) 0 cm 11/20/23 1052   Wound Width (cm) 0 cm 11/20/23 1052   Wound Depth (cm) 0 cm 11/20/23 1052   Wound Surface Area (cm^2) 0 cm^2 11/20/23 1052   Wound Volume (cm^3) 0 cm^3 11/20/23 1052   Calculated Wound Volume (cm^3) 0 cm^3 11/20/23 1052   Drainage Amount None 11/20/23 1052   Treatments Cleansed/Hydraguard applied 11/20/23 1052   Dressing Changed New 11/20/23 1052   Dressing Status Intact 11/20/23 1052       Wound 11/18/23 Diabetic Ulcer Pretibial Left (Active)   Wound Image   11/20/23 1059   Wound Description Granulation tissue;Pink;Beefy red;Slough; White 11/20/23 1059   Ramandeep-wound Assessment Hyperpigmented; Purple;Edema; Erythema 11/20/23 1059   Wound Length (cm) 9.8 cm 11/20/23 1059   Wound Width (cm) 6.3 cm 11/20/23 1059   Wound Depth (cm) 0.1 cm 11/20/23 1059   Wound Surface Area (cm^2) 61.74 cm^2 11/20/23 1059   Wound Volume (cm^3) 6.174 cm^3 11/20/23 1059   Calculated Wound Volume (cm^3) 6.17 cm^3 11/20/23 1059   Change in Wound Size % 82.3 11/20/23 1059   Drainage Amount Large 11/20/23 1059   Drainage Description Serosanguineous 11/20/23 1059   Non-staged Wound Description Full thickness 11/20/23 1059   Treatments Cleansed 11/20/23 1059   Dressing Calcium Alginate with Silver; Adaptic;ABD;Dry dressing 11/20/23 1059   Dressing Changed New 11/20/23 1059   Dressing Status Clean;Dry; Intact 11/20/23 1059     Discussed assessment findings, and plan of care/recommendations with Omar Guerrero RN. Wound care will follow along with patient throughout admission, please call or tiger text with questions and concerns. Recommendations written as orders.   Dae MCPHERSON, RN, [facility]

## 2023-11-20 NOTE — PLAN OF CARE
Problem: INFECTION - ADULT  Goal: Absence or prevention of progression during hospitalization  Description: INTERVENTIONS:  - Assess and monitor for signs and symptoms of infection  - Monitor lab/diagnostic results  - Monitor all insertion sites, i.e. indwelling lines, tubes, and drains  - Monitor endotracheal if appropriate and nasal secretions for changes in amount and color  - [place] appropriate cooling/warming therapies per order  - Administer medications as ordered  - Instruct and encourage patient and family to use good hand hygiene technique  - Identify and instruct in appropriate isolation precautions for identified infection/condition  Outcome: Progressing     Problem: Potential for Falls  Goal: Patient will remain free of falls  Description: INTERVENTIONS:  - Educate patient/family on patient safety including physical limitations  - Instruct patient to call for assistance with activity   - Consult OT/PT to assist with strengthening/mobility   - Keep Call bell within reach  - Keep bed low and locked with side rails adjusted as appropriate  - Keep care items and personal belongings within reach  - Initiate and maintain comfort rounds  - Make Fall Risk Sign visible to staff  - Offer Toileting every 2 Hours, in advance of need  - Initiate/Maintain chair alarm  - Obtain necessary fall risk management equipment: non-skid socks  - Apply yellow socks and bracelet for high fall risk patients  - Consider moving patient to room near nurses station  Outcome: Progressing

## 2023-11-20 NOTE — ASSESSMENT & PLAN NOTE
Per son when patient was working with therapy most of his pain seems to be localized to the left ankle  Patient does have history of gout and reported that the pain feels similar  Check uric acid level although in the setting of an acute gout flare up, levels can be low or normal  X-ray of the left ankle area showed mild diffuse soft tissue swelling of the lower leg  If continues to have pain tomorrow despite being on antibiotics, can consider prednisone for possible gout

## 2023-11-20 NOTE — PHYSICAL THERAPY NOTE
PHYSICAL THERAPY EVALUATION/TREATMENT     11/20/23 8185   PT Last Visit   PT Visit Date 11/20/23   Note Type   Note type Evaluation   Pain Assessment   Pain Assessment Tool 0-10   Pain Score 9  (Left ankle area with weightbearing/walking)   Restrictions/Precautions   Other Precautions Chair Alarm; Bed Alarm; Fall Risk;Pain;Hard of hearing   Home Living   Type of [address] Two level; Able to live on main level with bedroom/bathroom  (one step with threshold to enter home)   Colin Moreno   Additional Comments Patient ambulating with roller walker prior to admission   Prior Function   Lives With Son   Receives Help From Family;Home health  (Home PT and home prior to admission. Patient has improved and was decreased to 1 time per week and discharged from OT as per son)   Falls in the last 6 months 1 to 4   Comments Patient having assist in home as needed although ambulating short distances with walker basically independently as per son   General   Additional Pertinent History Chart reviewed, patient admitted with cellulitis of the left lower extremity.   Patient with medical history and short-term rehab stay in July upon return home patient at home therapy services and was improved to a point where he could ambulate short distances in the home with a walker   Family/Caregiver Present Yes  (Son present)   Cognition   Overall Cognitive Status Impaired   Arousal/Participation Cooperative   Orientation Level Oriented to person;Oriented to place;Oriented to situation   Following Commands Follows multistep commands with increased time or repetition   Comments Patient distracted at times and with pain in his left ankle   Subjective   Subjective Patient reports pain in the left ankle with weightbearing only not with range of motion   RLE Assessment   RLE Assessment   (Range of motion within functional limits, strength 3+/4 -)   LLE Assessment   LLE Assessment   (Range of motion within functional limits, strength 3+/5)   Coordination   Movements are Fluid and Coordinated 0   Coordination and Movement Description Decreased coordination with transfer and gait activity with pain in left ankle   Bed Mobility   Supine to Sit 4  Minimal assistance   Additional items Assist x 1;Verbal cues;HOB elevated   Sit to Supine 4  Minimal assistance   Additional items Assist x 1;HOB elevated   Transfers   Sit to Stand 3  Moderate assistance   Additional items Assist x 1;Verbal cues   Stand to Sit 3  Moderate assistance   Additional items Assist x 1;Verbal cues   Ambulation/Elevation   Gait Assistance 3  Moderate assist   Additional items Assist x 1;Verbal cues; Tactile cues   Assistive Device Rolling walker   Distance 2-3 steps at bedside with limited weightbearing on left ankle due to pain as per patient. Unsteady patterning and shortened stride length noted(nurse made aware and to medicate as appropriate)   Balance   Static Sitting Fair   Dynamic Sitting Fair -   Static Standing Fair -   Dynamic Standing Poor +   Ambulatory Poor +   Activity Tolerance   Activity Tolerance Patient limited by fatigue;Patient limited by pain;Treatment limited secondary to medical complications (Comment)   Nurse Made Aware Yes   Assessment   Prognosis Good   Problem List Decreased strength;Decreased range of motion;Decreased endurance; Impaired balance;Decreased mobility; Decreased coordination; Impaired hearing;Pain;Decreased skin integrity   Assessment Patient seen for Physical Therapy evaluation. Patient admitted with Cellulitis of left lower extremity. Comorbidities affecting patient's physical performance include: .   Personal factors affecting patient at time of initial evaluation include: lives in two story house, ambulating with assistive device, inability to ambulate household distances, inability to navigate community distances, inability to navigate level surfaces without external assistance, inability to perform dynamic tasks in community, limited home support, positive fall history, inability to perform physical activity, inability to perform ADLS, and inability to perform IADLS . Prior to admission, patient was independent with functional mobility with walker, requiring assist for ADLS, requiring assist for IADLS, living in a multi-level home, ambulating household distance, and home with family assist.  Please find objective findings from Physical Therapy assessment regarding body systems outlined above with impairments and limitations including weakness, decreased ROM, impaired balance, decreased endurance, impaired coordination, gait deviations, pain, decreased activity tolerance, decreased functional mobility tolerance, fall risk, SOB upon exertion, and decreased skin integrity. The Barthel Index was used as a functional outcome tool presenting with a score of Barthel Index Score: 35 today indicating marked limitations of functional mobility and ADLS. Patient's clinical presentation is currently unstable/unpredictable as seen in patient's presentation of vital sign response, changing level of pain, increased fall risk, new onset of impairment of functional mobility, decreased endurance, and new onset of weakness. Pt would benefit from continued Physical Therapy treatment to address deficits as defined above and maximize level of functional mobility. As demonstrated by objective findings, the assigned level of complexity for this evaluation is high. The patient's -[facility] Basic Mobility Inpatient Short Form Raw Score is 14. A Raw score of less than or equal to 16 suggests the patient may benefit from discharge to post-acute rehabilitation services. Please also refer to the recommendation of the Physical Therapist for safe discharge planning.    Goals   Patient Goals To decrease pain in left ankle   STG Expiration Date 11/27/23   Short Term Goal #1 Transfers and gait with roller walker with min assist   Short Term Goal #2 Gait endurance to 40 feet   LTG Expiration Date 12/04/23   Long Term Goal #1 Strength bilateral lower extremities 4/5   Long Term Goal #2 Independent transfers and gait with roller walker as able with decreased pain L ankle   Plan   Treatment/Interventions ADL retraining;Functional transfer training;LE strengthening/ROM; Therapeutic exercise; Endurance training;Patient/family training;Equipment eval/education; Bed mobility;Gait training; Compensatory technique education   PT Frequency Other (Comment)  (5w)   Discharge Recommendation   Rehab Resource Intensity Level, PT II (Moderate Resource Intensity)   AM-PAC Basic Mobility Inpatient   Turning in Flat Bed Without Bedrails 3   Lying on Back to Sitting on Edge of Flat Bed Without Bedrails 3   Moving Bed to Chair 2   Standing Up From Chair Using Arms 3   Walk in Room 2   Climb 3-5 Stairs With Railing 1   Basic Mobility Inpatient Raw Score 14   Basic Mobility Standardized Score 35.55   Highest Level Of Mobility   -[facility] Goal 4: Move to chair/commode   -HL Achieved 5: Stand (1 or more minutes)   Barthel Index   Feeding 5   Bathing 0   Grooming Score 0   Dressing Score 5   Bladder Score 5   Bowels Score 10   Toilet Use Score 5   Transfers (Bed/Chair) Score 5   Mobility (Level Surface) Score 0   Stairs Score 0   Barthel Index Score 35   Additional Treatment Session   Start Time 1500   End Time 1515   Treatment Assessment S: Patient with increased left ankle pain with weightbearing and gait activity O: Bilateral lower extremity exercise completed as listed belowA: Patient will benefit from continued physical therapy with progression as tolerated and increasing functional mobility with clinical staff as well   Exercises   Heelslides Supine;10 reps;Bilateral   Hip Flexion Sitting;10 reps;Bilateral  (Alternating)   Hip Abduction Sitting;10 reps;Bilateral  (Alternating)   Knee AROM Long Arc Quad Sitting;10 reps;Bilateral  (Alternating)   Ankle Pumps Supine;20 reps;Bilateral   Licensure   [place] License Number  Esperanza Less, PT 4 0 QA [id number]

## 2023-11-20 NOTE — ASSESSMENT & PLAN NOTE
Lab Results   Component Value Date    EGFR 27 11/20/2023    EGFR 28 11/19/2023    EGFR 25 11/18/2023    CREATININE 2.14 (H) 11/20/2023    CREATININE 2.09 (H) 11/19/2023    CREATININE 2.29 (H) 11/18/2023     Per outpatient nephrology note baseline creatinine is 2.5-3  Creatinine continues to remain at baseline. Off IV fluids  Repeat lab work in a.m.

## 2023-11-20 NOTE — ASSESSMENT & PLAN NOTE
Chronic anemia:  - Hemoglobin remains stable  - rpt labwork in AM    Results from last 7 days   Lab Units 11/20/23  0629 11/19/23  0556 11/19/23  0310 11/18/23  1617   HEMOGLOBIN g/dL 9.6* 9.6* 9.7* 9.9*   HEMATOCRIT % 30.5* 30.6* 31.0* 31.4*   MCV fL 95 94 95 94

## 2023-11-20 NOTE — ASSESSMENT & PLAN NOTE
Patient with thrombocytopenia, anemia and also with some leukopenia  -Counts are low but acceptable  -Patient seen by hematology and patient to follow-up after discharge for further evaluation including possible bone marrow biopsy  - Continue Eliquis for now 2.5 mg po bid   Rpt labs in AM

## 2023-11-20 NOTE — ASSESSMENT & PLAN NOTE
Lab Results   Component Value Date    HGBA1C 8.3 (H) 02/23/2023       Recent Labs     11/19/23  1619 11/19/23 2050 11/20/23  0656 11/20/23  1104   POCGLU 118 196* 83 161*     Continue SSI, lantus. Blood sugar stable.  Holding po meds januvia and glimepiride   Update A1C

## 2023-11-20 NOTE — DISCHARGE INSTR - OTHER ORDERS
Plan:   Skin care plans:    1-Cleanse wound to left anterior lower leg with wound cleanser & pat dry. Apply Adaptic to wound bed cut to size of wound then apply Maxorb Ag+ silver alginate cut to size of wound. Cover with ABD, cari & tape. Change every other day & as needed for soilage/dislodgement. 2-Apply Hydraguard barrier cream to bilateral sacrum, buttock and heels 2x/day and as needed. 3-Float heels on 2 pillows to offload pressure so heels are not in contact with mattress or pillows. 4-Use pressure redistribution cushion in chair when out of bed. Limit prolonged sitting. 5-Moisturize skin daily with skin nourishing cream.  6-Turn/reposition every 2 hrs in bed for pressure re-distribution on skin.

## 2023-11-20 NOTE — ASSESSMENT & PLAN NOTE
Left lower extremity cellulitis:  - CT LLE -diffuse subcutaneous soft tissue edema/ inflammatory change throughout the distal left lower extremity consistent with cellulitis. .   - was given a dose of IV Ceftriaxone in ED  - Continue high dose of IV Ancef 2 gm Q 12 hrs (renally dosed)

## 2023-11-20 NOTE — CONSULTS
Abdulaziz Gilliland [facility]  1940    HEMATOLOGY/ONCOLOGY CONSULTATION REPORT    DISCUSSION/SUMMARY:    57-year-old male with a number of medical problems admitted with left lower extremity cellulitis. Further work-up demonstrated anemia and thrombocytopenia. I have trended recent CBCs below but have also added a CBC from June 2023 (in fact the last relatively normal CBC goes all the way back to January 2023). In June 2023, patient had a decreased white count, elevated MCV, somewhat decreased hemoglobin level as well as thrombocytopenia. There may be an underlying disorder (bone marrow) exacerbated by the present infection. As with many patients, there may be more than 1 cause for the anemia, BUN and creatinine are elevated. Patient may also have anemia of chronic renal insufficiency. For the time being, patient should be treated for the cellulitis. Once Mr. Tipton is better, stable and is being discharged, patient will be given a hematology follow-up appointment. Not sure how much patient understood about the anemia/thrombocytopenia today; bone marrow biopsy may be necessary in the future. The above was discussed with the patient; Mr. TORRES [facility] had no questions. Will follow with you. Please do not hesitate to contact me if you have any questions or need additional information. Thank you for this consult.  ________________________________________________________________________________    Chief Complaint   Patient presents with    Leg Swelling     Patient brought by son with whom he resides. Patient has visiting nurse for left lower leg wound for about 1-2 weeks. Son states visiting nurse stated he needs ABX. Patient c/o ankle pain. Wound Infection     History of Present Illness: 57-year-old male with history of gout, atrial fibrillation, dyslipidemia, diabetes, BPH presented with persistent left lower extremity nonhealing wound that has been getting progressively worse.   Patient was admitted for treatment of cellulitis of the left lower extremity. Work-up also demonstrated anemia and thrombocytopenia. Mr. Jessy Duron was found in the chair next to his bed. Patient was eating breakfast, no distress, no signs of pain. Patient denied any knowledge of anemia or thrombocytopenia. No bruising or bleeding issues recently. Review of Systems   Constitutional:  Positive for fatigue. HENT: Negative. Eyes: Negative. Respiratory: Negative. Cardiovascular: Negative. Gastrointestinal: Negative. Endocrine: Negative. Genitourinary: Negative. Musculoskeletal: Negative. Skin: Negative. Allergic/Immunologic: Negative. Neurological: Negative. Hematological: Negative. Psychiatric/Behavioral: Negative. All other systems reviewed and are negative.     Patient Active Problem List   Diagnosis    Closed traumatic displaced fracture of distal end of left radius with malunion    PVC (premature ventricular contraction)    Essential hypertension    Leg edema    Type 2 diabetes mellitus (HCC)    Recent pericardial effusion    Chronic combined systolic and diastolic congestive heart failure (HCC)    Chronic kidney disease, stage 4 (severe) ([facility])    Vitamin D deficiency    Cellulitis of left upper extremity    Chronic atrial fibrillation (HCC)    COVID-19    Electrolyte abnormality    Benign hypertension with CKD (chronic kidney disease) stage IV (HCC)    Persistent proteinuria    Chronic anemia    Chronic kidney disease-mineral and bone disorder    Advanced care planning/counseling discussion    Hyponatremia    Macrocytosis    Urinary retention    Septic shock (HCC)    Thrombocytopenia (HCC)    Diarrhea    Hypothermia    Hypoglycemia    Recent empyema of lung with persistent locuted R hydropneumothorax    Encephalopathy    Duodenal ulcer    Goals of care, counseling/discussion    Pleural effusion exudative    Constipation    Cellulitis of left lower extremity    Chronic combined systolic and diastolic CHF (congestive heart failure) (HCC)    Dyslipidemia    BPH (benign prostatic hyperplasia)     Past Medical History:   Diagnosis Date    Atrial fibrillation ([address])     Chronic kidney disease     Coronary artery disease     Diabetes mellitus ([address])     Hyperlipidemia     Hypertension      Past Surgical History:   Procedure Laterality Date    CARDIAC CATHETERIZATION N/A 6/30/2023    Procedure: Cardiac pericardiocentesis; Surgeon: Deirdre Salas DO;  [facility] CARD[facility]AC [facility] LAB; Service: Cardiology    CARDIAC CATHETERIZATION N/A 6/30/2023    Procedure: Cardiac RHC; Surgeon: Deirdre Salas DO;  [facility] CARD[facility]AC C[facility]; Service: Cardiology    EYE SURGERY      IR CHEST TUBE PLACEMENT  6/24/2023    IR CHEST TUBE PLACEMENT  7/28/2023    IR PLEURAL EFFUSION LONG-TERM CATHETER PLACEMENT  8/7/2023    SKIN CANCER EXCISION      TONSILLECTOMY       Family History   Problem Relation Age of Onset    Heart disease Mother     Diabetes Father     Vision loss Father     Cancer Sister     Diabetes Sister      Social History     Socioeconomic History    Marital status:       Spouse name: Not on file    Number of children: 1    Years of education: 15    Highest education level: 12th grade   Occupational History    Not on file   Tobacco Use    Smoking status: Former    Smokeless tobacco: Former   Vaping Use    Vaping Use: Never used   Substance and Sexual Activity    Alcohol use: Not Currently     Alcohol/week: 0.0 standard drinks of alcohol     Comment: special occasions    Drug use: Not Currently    Sexual activity: Not on file   Other Topics Concern    Not on file   Social History Narrative    Not on file     Social Determinants of Health     Financial Resource Strain: Not on file   Food Insecurity: No Food Insecurity (7/28/2023)    Hunger Vital Sign     Worried About Running Out of Food in the Last Year: Never true     Ran Out of Food in the Last Year: Never true   Transportation Needs: No Transportation Needs (7/28/2023)    PRAPARE - Transportation     Lack of Transportation (Medical): No     Lack of Transportation (Non-Medical):  No   Physical Activity: Not on file   Stress: Not on file   Social Connections: Not on file   Intimate Partner Violence: Not on file   Housing Stability: Low Risk  (7/28/2023)    Housing Stability Vital Sign     Unable to Pay for Housing in the Last Year: No     Number of Places Lived in the Last Year: 1     Unstable Housing in the Last Year: No       Current Facility-Administered Medications:     acetaminophen (TYLENOL) tablet 650 mg, 650 mg, Oral, Q6H PRN, Ignacia Wallace MD    allopurinol (ZYLOPRIM) tablet 100 mg, 100 mg, Oral, BID, Ignacia Wallace MD, 100 mg at 11/19/23 1739    ALPRAZolam (XANAX) tablet 0.5 mg, 0.5 mg, Oral, HS PRN, Ignacia Wallace MD    aluminum-magnesium hydroxide-simethicone (MAALOX) oral suspension 30 mL, 30 mL, Oral, Q6H PRN, Ignacia Wallace MD    apixaban Bettylou Galea) tablet 2.5 mg, 2.5 mg, Oral, BID, Ignacia Wallace MD, 2.5 mg at 11/19/23 1739    atorvastatin (LIPITOR) tablet 40 mg, 40 mg, Oral, Daily With Hank Lincoln MD, 40 mg at 11/19/23 1641    ceFAZolin (ANCEF) IVPB (premix in dextrose) 2,000 mg 50 mL, 2,000 mg, Intravenous, Q12H, Phoebe Perez DO, Last Rate: 100 mL/hr at 11/19/23 2120, 2,000 mg at 11/19/23 2120    ferrous sulfate tablet 325 mg, 325 mg, Oral, Daily With Breakfast, Ignacia Wallace MD, 325 mg at 11/19/23 0904    insulin glargine (LANTUS) subcutaneous injection 5 Units 0.05 mL, 5 Units, Subcutaneous, HS, Ignacia Wallace MD, 5 Units at 11/19/23 2120    insulin lispro (HumaLOG) 100 units/mL subcutaneous injection 1-5 Units, 1-5 Units, Subcutaneous, TID AC **AND** Fingerstick Glucose (POCT), , , TID AC, Ignacia Wallace MD    insulin lispro (HumaLOG) 100 units/mL subcutaneous injection 1-5 Units, 1-5 Units, Subcutaneous, HS, Ignacia Wallace MD, 1 Units at 11/19/23 2120    latanoprost (XALATAN) 0.005 % ophthalmic solution 1 drop, 1 drop, Both Eyes, HS, Rogers Eaton MD, 1 drop at 11/19/23 2120    magnesium hydroxide (MILK OF MAGNESIA) oral suspension 30 mL, 30 mL, Oral, Daily PRN, Rogers Eaton MD    ondansetron [facility]) injection 4 mg, 4 mg, Intravenous, Q6H PRN, Rogers Eaton MD    tamsulosin [facility]) capsule 0.4 mg, 0.4 mg, Oral, Daily With Jennifer Rivera MD, 0.4 mg at 11/19/23 1641    timolol (TIMOPTIC) 0.5 % ophthalmic solution 1 drop, 1 drop, Both Eyes, Daily, Rogers Eaton MD, 1 drop at 11/19/23 0908    torsemide (DEMADEX) tablet 80 mg, 80 mg, Oral, Daily, Phoebe Perez DO, 80 mg at 11/19/23 1452    zinc sulfate (ZINCATE) capsule 220 mg, 220 mg, Oral, Daily, Rogers Eaton MD, 220 mg at 11/19/23 0904    Allergies   Allergen Reactions    Elemental Sulfur     Sulfa Antibiotics        Vitals:    11/19/23 2222   BP: 144/76   Pulse: 73   Resp: 20   Temp: (!) 96.8 °F (36 °C)   SpO2: 98%     Physical Exam  Constitutional:       Appearance: He is well-developed. Comments: Elderly male, no respiratory distress, no signs of pain   HENT:      Head: Normocephalic and atraumatic. Right Ear: External ear normal.      Left Ear: External ear normal.   Eyes:      Conjunctiva/sclera: Conjunctivae normal.      Pupils: Pupils are equal, round, and reactive to light. Cardiovascular:      Rate and Rhythm: Normal rate and regular rhythm. Heart sounds: Normal heart sounds. Pulmonary:      Effort: Pulmonary effort is normal.      Breath sounds: Normal breath sounds. Abdominal:      General: Bowel sounds are normal.      Palpations: Abdomen is soft. Comments: Soft, nontender, + bowel sounds, no guarding   Musculoskeletal:         General: Normal range of motion. Cervical back: Normal range of motion and neck supple. Skin:     General: Skin is warm.       Comments: Relatively good color, warm, moist, scattered purpura, no petechial rash, no ecchymoses, no active bleeding   Neurological:      Mental Status: He is alert and oriented to person, place, and time. Deep Tendon Reflexes: Reflexes are normal and symmetric. Psychiatric:         Behavior: Behavior normal.         Thought Content:  Thought content normal.         Judgment: Judgment normal.     Extremities: Left lower extremity wound deferred, 0-1+ bilateral lower extremity edema, worse on left, no cords, pulses are decreased  Lymphatics: No adenopathy in the neck, supraclavicular region, axilla bilaterally    Labs        11/20/2023 BUN = 66 creatinine = 2.14 AST = 14 ALT = 9 alkaline phosphatase = 121 total protein = 5.8 albumin = 3.3 total bilirubin = 0.48    6/23/2023 WBC = 3.64 hemoglobin = 10.7 hematocrit = 32.5 MCV = 105 platelet = 66    Imaging    [place]/18/2023 CAT scan lower extremity left without contrast.  Impression stated diffuse subcutaneous soft tissue edema inflammatory changes throughout the distal left lower extremity consistent with cellulitis

## 2023-11-20 NOTE — OCCUPATIONAL THERAPY NOTE
Occupational Therapy Cancellation     Patient Name: Shanna Mosley  PSSFN'V Date: 11/20/2023  Problem List  Principal Problem:    Cellulitis of left lower extremity  Active Problems:    Type 2 diabetes mellitus (HCC)    Chronic kidney disease, stage 4 (severe) (HCC)    Chronic atrial fibrillation (HCC)    Chronic anemia    Thrombocytopenia (HCC)    Chronic combined systolic and diastolic CHF (congestive heart failure) (HCC)    Dyslipidemia    BPH (benign prostatic hyperplasia)    Past Medical History  Past Medical History:   Diagnosis Date    Atrial fibrillation ([address])     Chronic kidney disease     Coronary artery disease     Diabetes mellitus ([address])     Hyperlipidemia     Hypertension      Past Surgical History  Past Surgical History:   Procedure Laterality Date    CARDIAC CATHETERIZATION N/A 6/30/2023    Procedure: Cardiac pericardiocentesis; Surgeon: Nina Dunlap DO;  [facility]; Service: Cardiology    CARDIAC CATHETERIZATION N/A 6/30/2023    Procedure: Cardiac RHC; Surgeon: Nina Dunlap DO;  [facility]; Service: Cardiology    EYE SURGERY      IR CHEST TUBE PLACEMENT  6/24/2023    IR CHEST TUBE PLACEMENT  7/28/2023    IR PLEURAL EFFUSION LONG-TERM CATHETER PLACEMENT  8/7/2023    SKIN CANCER EXCISION      TONSILLECTOMY          11/20/23 1417   Note Type   Note type Cancelled Session  (Monday 11/20/23)   Cancel Reasons Other  (pt declined participation due to fatigue, tired. Pt added that he just returned to bed)   Additional Comments OT orders received and chart review completed. Contact made w/ pt. Pt sleeping in bed but easily aroused. Pt declined participation despite encouragement.  Will continue to follow     Prisca Palomares OTR/JOHN  [id number]  [id number]

## 2023-11-21 ENCOUNTER — TELEPHONE (OUTPATIENT)
Dept: HEMATOLOGY ONCOLOGY | Facility: CLINIC | Age: 83
End: 2023-11-21

## 2023-11-21 LAB
ALBUMIN SERPL BCP-MCNC: 3.3 G/DL (ref 3.5–5)
ALP SERPL-CCNC: 129 U/L (ref 34–104)
ALT SERPL W P-5'-P-CCNC: 4 U/L (ref 7–52)
ANION GAP SERPL CALCULATED.3IONS-SCNC: 9 MMOL/L
AST SERPL W P-5'-P-CCNC: 16 U/L (ref 13–39)
BILIRUB SERPL-MCNC: 0.47 MG/DL (ref 0.2–1)
BUN SERPL-MCNC: 62 MG/DL (ref 5–25)
CALCIUM ALBUM COR SERPL-MCNC: 9.2 MG/DL (ref 8.3–10.1)
CALCIUM SERPL-MCNC: 8.6 MG/DL (ref 8.4–10.2)
CHLORIDE SERPL-SCNC: 100 MMOL/L (ref 96–108)
CO2 SERPL-SCNC: 30 MMOL/L (ref 21–32)
CREAT SERPL-MCNC: 2.13 MG/DL (ref 0.6–1.3)
ERYTHROCYTE [DISTWIDTH] IN BLOOD BY AUTOMATED COUNT: 18.4 % (ref 11.6–15.1)
EST. AVERAGE GLUCOSE BLD GHB EST-MCNC: 206 MG/DL
GFR SERPL CREATININE-BSD FRML MDRD: 27 ML/MIN/1.73SQ M
GLUCOSE SERPL-MCNC: 119 MG/DL (ref 65–140)
GLUCOSE SERPL-MCNC: 243 MG/DL (ref 65–140)
GLUCOSE SERPL-MCNC: 264 MG/DL (ref 65–140)
GLUCOSE SERPL-MCNC: 325 MG/DL (ref 65–140)
GLUCOSE SERPL-MCNC: 34 MG/DL (ref 65–140)
HAPTOGLOB SERPL-MCNC: 132 MG/DL (ref 38–329)
HBA1C MFR BLD: 8.8 %
HCT VFR BLD AUTO: 31.6 % (ref 36.5–49.3)
HGB BLD-MCNC: 9.8 G/DL (ref 12–17)
MCH RBC QN AUTO: 29 PG (ref 26.8–34.3)
MCHC RBC AUTO-ENTMCNC: 31 G/DL (ref 31.4–37.4)
MCV RBC AUTO: 94 FL (ref 82–98)
PLATELET # BLD AUTO: 93 THOUSANDS/UL (ref 149–390)
PMV BLD AUTO: 9.5 FL (ref 8.9–12.7)
POTASSIUM SERPL-SCNC: 3.5 MMOL/L (ref 3.5–5.3)
PROT SERPL-MCNC: 6 G/DL (ref 6.4–8.4)
RBC # BLD AUTO: 3.38 MILLION/UL (ref 3.88–5.62)
SODIUM SERPL-SCNC: 139 MMOL/L (ref 135–147)
WBC # BLD AUTO: 5.81 THOUSAND/UL (ref 4.31–10.16)

## 2023-11-21 PROCEDURE — 82948 REAGENT STRIP/BLOOD GLUCOSE: CPT

## 2023-11-21 PROCEDURE — 85027 COMPLETE CBC AUTOMATED: CPT | Performed by: FAMILY MEDICINE

## 2023-11-21 PROCEDURE — 99232 SBSQ HOSP IP/OBS MODERATE 35: CPT

## 2023-11-21 PROCEDURE — 97167 OT EVAL HIGH COMPLEX 60 MIN: CPT

## 2023-11-21 PROCEDURE — 80053 COMPREHEN METABOLIC PANEL: CPT | Performed by: FAMILY MEDICINE

## 2023-11-21 RX ORDER — DEXTROSE MONOHYDRATE 25 G/50ML
50 INJECTION, SOLUTION INTRAVENOUS ONCE
Status: COMPLETED | OUTPATIENT
Start: 2023-11-21 | End: 2023-11-21

## 2023-11-21 RX ORDER — PREDNISONE 20 MG/1
40 TABLET ORAL DAILY
Status: DISCONTINUED | OUTPATIENT
Start: 2023-11-21 | End: 2023-11-22

## 2023-11-21 RX ADMIN — TORSEMIDE 80 MG: 20 TABLET ORAL at 09:18

## 2023-11-21 RX ADMIN — LATANOPROST 1 DROP: 50 SOLUTION OPHTHALMIC at 21:45

## 2023-11-21 RX ADMIN — DOCUSATE SODIUM 100 MG: 100 CAPSULE, LIQUID FILLED ORAL at 09:18

## 2023-11-21 RX ADMIN — APIXABAN 2.5 MG: 2.5 TABLET, FILM COATED ORAL at 09:18

## 2023-11-21 RX ADMIN — CEFAZOLIN SODIUM 2000 MG: 2 SOLUTION INTRAVENOUS at 09:03

## 2023-11-21 RX ADMIN — APIXABAN 2.5 MG: 2.5 TABLET, FILM COATED ORAL at 17:07

## 2023-11-21 RX ADMIN — DOCUSATE SODIUM 100 MG: 100 CAPSULE, LIQUID FILLED ORAL at 17:07

## 2023-11-21 RX ADMIN — INSULIN LISPRO 2 UNITS: 100 INJECTION, SOLUTION INTRAVENOUS; SUBCUTANEOUS at 17:08

## 2023-11-21 RX ADMIN — INSULIN GLARGINE 5 UNITS: 100 INJECTION, SOLUTION SUBCUTANEOUS at 21:45

## 2023-11-21 RX ADMIN — PREDNISONE 40 MG: 20 TABLET ORAL at 14:57

## 2023-11-21 RX ADMIN — ALLOPURINOL 100 MG: 100 TABLET ORAL at 17:07

## 2023-11-21 RX ADMIN — ZINC SULFATE 220 MG (50 MG) CAPSULE 220 MG: CAPSULE at 09:18

## 2023-11-21 RX ADMIN — FERROUS SULFATE TAB 325 MG (65 MG ELEMENTAL FE) 325 MG: 325 (65 FE) TAB at 09:18

## 2023-11-21 RX ADMIN — ALLOPURINOL 100 MG: 100 TABLET ORAL at 09:18

## 2023-11-21 RX ADMIN — TAMSULOSIN HYDROCHLORIDE 0.4 MG: 0.4 CAPSULE ORAL at 17:07

## 2023-11-21 RX ADMIN — TIMOLOL MALEATE 1 DROP: 5 SOLUTION/ DROPS OPHTHALMIC at 09:19

## 2023-11-21 RX ADMIN — INSULIN LISPRO 3 UNITS: 100 INJECTION, SOLUTION INTRAVENOUS; SUBCUTANEOUS at 21:45

## 2023-11-21 RX ADMIN — CEFAZOLIN SODIUM 2000 MG: 2 SOLUTION INTRAVENOUS at 20:53

## 2023-11-21 RX ADMIN — DEXTROSE MONOHYDRATE 50 ML: 25 INJECTION, SOLUTION INTRAVENOUS at 06:43

## 2023-11-21 RX ADMIN — ATORVASTATIN CALCIUM 40 MG: 40 TABLET, FILM COATED ORAL at 17:07

## 2023-11-21 NOTE — ASSESSMENT & PLAN NOTE
Chronic anemia:  - Hemoglobin remains stable  - rpt labwork in AM    Results from last 7 days   Lab Units 11/21/23  0449 11/20/23  0629 11/19/23  0556 11/19/23  0310 11/18/23  1617   HEMOGLOBIN g/dL 9.8* 9.6* 9.6* 9.7* 9.9*   HEMATOCRIT % 31.6* 30.5* 30.6* 31.0* 31.4*   MCV fL 94 95 94 95 94

## 2023-11-21 NOTE — ASSESSMENT & PLAN NOTE
Lab Results   Component Value Date    EGFR 27 11/21/2023    EGFR 27 11/20/2023    EGFR 28 11/19/2023    CREATININE 2.13 (H) 11/21/2023    CREATININE 2.14 (H) 11/20/2023    CREATININE 2.09 (H) 11/19/2023     Per outpatient nephrology note baseline creatinine is 2.5-3  Creatinine continues to remain at baseline. Off IV fluids  Repeat lab work in a.m.

## 2023-11-21 NOTE — TELEPHONE ENCOUNTER
New Patient Intake Form   Patient Details:    Earnstine Rinne  1940    Appointment Information   Who is calling to schedule? Pattie Grandchild   If not self, what is the caller's name? NA   DID YOU CONFIRM INSURANCE WITH PATIENT? E verified, Routed to finance   Referring provider Dr. Jones Bear   What is the diagnosis? Anemia, thrombocytopenia     Is there a confirmed tissue diagnosis? NA     Is there a biopsy ordered or pending? Please specify dates  If yes, route to /OCC   NA     Is patient aware of diagnosis? Yes     Have you had any imaging or labs done? If yes, where? (If imaging done outside of [facility], please remind patient to bring a disk.) Yes-[facility]     If imaging done at outside facility, did you instruct patient to obtain discs and bring to visit? NA   Have you been seen by another Oncologist/Hematologist?  If so, who and where? No   Are the records in [facility] or Care Everywhere? Yes   Does the patient have records at another facility/hospital?    If yes, Name of facility, city and state where facility is located. [facility]     Did you instruct patient to have records faxed to rightx and provide rightfax number? In care everywhere   Preferred [place]   Is the patient willing to be seen by another provider? (This is for breast patients only) NA     Did you send new patient paperwork? Email or mail? NA   Miscellaneous Information: The patient is scheduled for his HFU appointment with Dr. Jones Bear on 1/16/24 at 0900 in the [facility] office.

## 2023-11-21 NOTE — PLAN OF CARE
Problem: Potential for Falls  Goal: Patient will remain free of falls  Description: INTERVENTIONS:  - Educate patient/family on patient safety including physical limitations  - Instruct patient to call for assistance with activity   - Consult OT/PT to assist with strengthening/mobility   - Keep Call bell within reach  - Keep bed low and locked with side rails adjusted as appropriate  - Keep care items and personal belongings within reach  - Initiate and maintain comfort rounds  - Make Fall Risk Sign visible to staff  - Offer Toileting every 2 Hours, in advance of need  - Initiate/Maintain bed alarm  - Obtain necessary fall risk management equipment: call bell   - Apply yellow socks and bracelet for high fall risk patients  - Consider moving patient to room near nurses station  Outcome: Progressing     Problem: PAIN - ADULT  Goal: Verbalizes/displays adequate comfort level or baseline comfort level  Description: Interventions:  - Encourage patient to monitor pain and request assistance  - Assess pain using appropriate pain scale  - Administer analgesics based on type and severity of pain and evaluate response  - Implement non-pharmacological measures as appropriate and evaluate response  - Consider cultural and social influences on pain and pain management  - Notify physician/advanced practitioner if interventions unsuccessful or patient reports new pain  Outcome: Progressing     Problem: INFECTION - ADULT  Goal: Absence or prevention of progression during hospitalization  Description: INTERVENTIONS:  - Assess and monitor for signs and symptoms of infection  - Monitor lab/diagnostic results  - Monitor all insertion sites, i.e. indwelling lines, tubes, and drains  - Monitor endotracheal if appropriate and nasal secretions for changes in amount and color  - [place] appropriate cooling/warming therapies per order  - Administer medications as ordered  - Instruct and encourage patient and family to use good hand hygiene technique  - Identify and instruct in appropriate isolation precautions for identified infection/condition  Outcome: Progressing     Problem: Prexisting or High Potential for Compromised Skin Integrity  Goal: Skin integrity is maintained or improved  Description: INTERVENTIONS:  - Identify patients at risk for skin breakdown  - Assess and monitor skin integrity  - Assess and monitor nutrition and hydration status  - Monitor labs   - Assess for incontinence   - Turn and reposition patient  - Assist with mobility/ambulation  - Relieve pressure over bony prominences  - Avoid friction and shearing  - Provide appropriate hygiene as needed including keeping skin clean and dry  - Evaluate need for skin moisturizer/barrier cream  - Collaborate with interdisciplinary team   - Patient/family teaching  - Consider wound care consult   Outcome: Progressing     Problem: Nutrition/Hydration-ADULT  Goal: Nutrient/Hydration intake appropriate for improving, restoring or maintaining nutritional needs  Description: Monitor and assess patient's nutrition/hydration status for malnutrition. Collaborate with interdisciplinary team and initiate plan and interventions as ordered. Monitor patient's weight and dietary intake as ordered or per policy. Utilize nutrition screening tool and intervene as necessary. Determine patient's food preferences and provide high-protein, high-caloric foods as appropriate.      INTERVENTIONS:  - Monitor oral intake, urinary output, labs, and treatment plans  - Assess nutrition and hydration status and recommend course of action  - Evaluate amount of meals eaten  - Assist patient with eating if necessary   - Allow adequate time for meals  - Recommend/ encourage appropriate diets, oral nutritional supplements, and vitamin/mineral supplements  - Order, calculate, and assess calorie counts as needed  - Recommend, monitor, and adjust tube feedings and TPN/PPN based on assessed needs  - Assess need for intravenous fluids  - Provide specific nutrition/hydration education as appropriate  - Include patient/family/caregiver in decisions related to nutrition  Outcome: Progressing

## 2023-11-21 NOTE — CASE MANAGEMENT
Case Management Progress Note    Patient name Yehuda Mcconnell  Location 2 [facility] 2  Erin  MRN [id number]  : 1940 Date 2023       LOS (days): 3  Geometric Mean LOS (GMLOS) (days): 3.20  Days to GMLOS:0.5        OBJECTIVE:        Current admission status: Inpatient  Preferred Pharmacy:   [id number], [address]  [address]   Phone: [phone] Fax: [phone]    Primary Care Provider: Itzel Jaramillo DO    Primary Insurance: MEDICARE  Secondary Insurance: BLUE CROSS    PROGRESS NOTE:    CM called patient's son and left voicemail introducing self and role, and purpose of phone call to discuss discharge planning, requested to call CM back. CM to follow up again later this morning.

## 2023-11-21 NOTE — ASSESSMENT & PLAN NOTE
Lab Results   Component Value Date    HGBA1C 8.8 (H) 11/20/2023       Recent Labs     11/20/23  1628 11/20/23  2059 11/21/23  0729 11/21/23  1127   POCGLU 121 155* 119 243*       Continue SSI, lantus. Blood sugar stable.  Holding po meds januvia and glimepiride   Update A1C

## 2023-11-21 NOTE — ASSESSMENT & PLAN NOTE
Left lower extremity cellulitis:  - CT LLE -diffuse subcutaneous soft tissue edema/ inflammatory change throughout the distal left lower extremity consistent with cellulitis. .   - was given a dose of IV Ceftriaxone in ED  - Continue high dose of IV Ancef 2 gm Q 12 hrs (renally dosed)  Wound culture preliminary results positive for Staph aureus. Awaiting final result for sensitivities  Consult ID  Continue local wound care.

## 2023-11-21 NOTE — CASE MANAGEMENT
Case Management Discharge Planning Note    Patient name Yehuda Mcconnell  Location [address] [place] [address]/2 [address] [facility] MRN [id number]  : 1940 Date 2023       Current Admission Date: 2023  Current Admission Diagnosis:Cellulitis of left lower extremity   Patient Active Problem List    Diagnosis Date Noted    Left ankle pain 2023    Chronic combined systolic and diastolic CHF (congestive heart failure) ([address]) 2023    Dyslipidemia 2023    BPH (benign prostatic hyperplasia) 2023    Cellulitis of left lower extremity 2023    Constipation 08/10/2023    Pleural effusion exudative 2023    Goals of care, counseling/discussion 2023    Duodenal ulcer 07/10/2023    Encephalopathy 2023    Recent empyema of lung with persistent locuted R hydropneumothorax 2023    Hypoglycemia 2023    Thrombocytopenia ([address]) 2023    Diarrhea 2023    Hypothermia 2023    Septic shock ([address]) 2023    Urinary retention 2023    Macrocytosis 2023    Hyponatremia 2023    Chronic anemia 2023    Chronic kidney disease-mineral and bone disorder 2023    Advanced care planning/counseling discussion 2023    Benign hypertension with CKD (chronic kidney disease) stage IV ([address]) 2022    Persistent proteinuria 2022    COVID-19 2022    Electrolyte abnormality 2022    Cellulitis of left upper extremity 2021    Chronic atrial fibrillation ([address]) 2021    Vitamin D deficiency 10/18/2019    Chronic kidney disease, stage 4 (severe) ([address]) 2019    Chronic combined systolic and diastolic congestive heart failure ([address]) 2019    Recent pericardial effusion 2019    Leg edema 01/10/2019    Type 2 diabetes mellitus ([address]) 01/10/2019    PVC (premature ventricular contraction) 2018    Essential hypertension 2018    Closed traumatic displaced fracture of distal end of left radius with malunion 02/09/2018      LOS (days): 3  Geometric Mean LOS (GMLOS) (days): 3.20  Days to GMLOS:0.4     OBJECTIVE:  Risk of Unplanned Readmission Score: 37.1         Current admission status: Inpatient   Preferred Pharmacy:   [facility] [address] [place] [address] [address] [place] [id number]  Phone: [phone] Fax: [phone]    Primary Care Provider: Carlota Hagen DO    Primary Insurance: MEDICARE  Secondary Insurance: BLUE CROSS    DISCHARGE DETAILS:          IMM Given (Date):: 11/21/23  IMM Given to[de-identified] Patient (IMM#2 reviewed with patient. Patient verbalized understanding and signed the form.  Copy given to the patient and original placed in scan bin.)

## 2023-11-21 NOTE — PROGRESS NOTES
[address]  Progress Note  Name: Maryjane Krishnan  MRN: [id number]  Unit/Bed#: 2 [address] Date of Admission: 11/18/2023   Date of Service: 11/21/2023 I Hospital Day: 3    Assessment/Plan   * Cellulitis of left lower extremity  Assessment & Plan  Left lower extremity cellulitis:  - CT LLE -diffuse subcutaneous soft tissue edema/ inflammatory change throughout the distal left lower extremity consistent with cellulitis. .   - was given a dose of IV Ceftriaxone in ED  - Continue high dose of IV Ancef 2 gm Q 12 hrs (renally dosed)  Wound culture preliminary results positive for Staph aureus. Awaiting final result for sensitivities  Consult ID  Continue local wound care. Left ankle pain  Assessment & Plan  Per son when patient was working with therapy most of his pain seems to be localized to the left ankle  Patient does have history of gout and reported that the pain feels similar  Uric acid level normal.  Although in the setting of an acute gout flare up, levels can be low or normal  X-ray of the left ankle area showed mild diffuse soft tissue swelling of the lower leg  Patient continues to have pain, will start on prednisone 40 mg daily x 5 days for possible gout    BPH (benign prostatic hyperplasia)  Assessment & Plan  Continue Flomax 0.4 mg    Dyslipidemia  Assessment & Plan  Continue statin 40 mg       Chronic combined systolic and diastolic CHF (congestive heart failure) (HCC)  Assessment & Plan  EF of 40%  Continue Demadex.        Thrombocytopenia ([address])  Assessment & Plan  Patient with thrombocytopenia, anemia and also with some leukopenia  -Counts are low but acceptable  -Patient seen by hematology and patient to follow-up after discharge for further evaluation including possible bone marrow biopsy  - Continue Eliquis for now 2.5 mg po bid   Rpt labs in AM        Chronic anemia  Assessment & Plan  Chronic anemia:  - Hemoglobin remains stable  - rpt labwork in AM    Results from last 7 days   Lab Units 11/21/23  0449 11/20/23  0629 11/19/23  0556 11/19/23  0310 11/18/23  1617   HEMOGLOBIN g/dL 9.8* 9.6* 9.6* 9.7* 9.9*   HEMATOCRIT % 31.6* 30.5* 30.6* 31.0* 31.4*   MCV fL 94 95 94 95 94          Chronic atrial fibrillation (HCC)  Assessment & Plan  continue Eliquis 2.5 mg BID    Chronic kidney disease, stage 4 (severe) (HCC)  Assessment & Plan  Lab Results   Component Value Date    EGFR 27 11/21/2023    EGFR 27 11/20/2023    EGFR 28 11/19/2023    CREATININE 2.13 (H) 11/21/2023    CREATININE 2.14 (H) 11/20/2023    CREATININE 2.09 (H) 11/19/2023     Per outpatient nephrology note baseline creatinine is 2.5-3  Creatinine continues to remain at baseline. Off IV fluids  Repeat lab work in a.m. Type 2 diabetes mellitus [facility])  Assessment & Plan  Lab Results   Component Value Date    HGBA1C 8.8 (H) 11/20/2023       Recent Labs     11/20/23  1628 11/20/23  2059 11/21/23  0729 11/21/23  1127   POCGLU 121 155* 119 243*       Continue SSI, lantus. Blood sugar stable. Holding po meds januvia and glimepiride   Update A1C               VTE Pharmacologic Prophylaxis:   High Risk (Score >/= 5) - Pharmacological DVT Prophylaxis Ordered: apixaban (Eliquis). Sequential Compression Devices Ordered. Mobility:   Basic Mobility Inpatient Raw Score: 14  -HLM Goal: 4: Move to chair/commode  -HLM Achieved: 5: Stand (1 or more minutes)  HLM Goal NOT achieved. Continue with multidisciplinary rounding and encourage appropriate mobility to improve upon [facility] goals. Patient Centered Rounds: I performed bedside rounds with nursing staff today. Discussions with Specialists or Other Care Team Provider: Yes    Education and Discussions with Family / Patient: Yes    Total Time Spent on Date of Encounter in care of patient: 35 mins.  This time was spent on one or more of the following: performing physical exam; counseling and coordination of care; obtaining or reviewing history; documenting in the medical record; reviewing/ordering tests, medications or procedures; communicating with other healthcare professionals and discussing with patient's family/caregivers. Current Length of Stay: 3 day(s)  Current Patient Status: Inpatient   Certification Statement: The patient will continue to require additional inpatient hospital stay due to left lower extremity cellulitis requiring IV antibiotics  Discharge Plan: Anticipate discharge in 24-48 hrs to discharge location to be determined pending rehab evaluations. Code Status: Level 3 - DNAR and DNI    Subjective:   Seen and examined at bedside. Denies chest pain or shortness of breath. Still with  pain in the left lower leg. Patient has history of gout and stated that the pain is similar to his flare up's. Will give Prednisone 40 mg x 5 days for acute flare up of gout. Objective:     Vitals:   Temp (24hrs), Av °F (36.1 °C), Min:95.7 °F (35.4 °C), Max:98.6 °F (37 °C)    Temp:  [95.7 °F (35.4 °C)-98.6 °F (37 °C)] 98 °F (36.7 °C)  HR:  [68-78] 72  Resp:  [18] 18  BP: (131-159)/(62-93) 140/62  SpO2:  [97 %-99 %] 97 %  Body mass index is 22.83 kg/m². Input and Output Summary (last 24 hours): Intake/Output Summary (Last 24 hours) at 2023 1242  Last data filed at 2023 0515  Gross per 24 hour   Intake --   Output 1400 ml   Net -1400 ml       Physical Exam:   Physical Exam  Vitals reviewed. Constitutional:       General: He is not in acute distress. Appearance: He is not ill-appearing. HENT:      Head: Normocephalic and atraumatic. Eyes:      General:         Right eye: No discharge. Left eye: No discharge. Cardiovascular:      Rate and Rhythm: Normal rate. Rhythm irregular. Pulmonary:      Effort: Pulmonary effort is normal. No respiratory distress. Breath sounds: Normal breath sounds. No wheezing or rales. Abdominal:      General: Bowel sounds are normal. There is no distension. Palpations: Abdomen is soft.       Tenderness: There is no abdominal tenderness. Musculoskeletal:      Right lower leg: Edema present. Left lower leg: Edema present. Comments: Left lower extremity with dressing CDI  Neurological:      Mental Status: He is alert. Additional Data:     Labs:  Results from last 7 days   Lab Units 11/21/23  0449 11/20/23  0629 11/19/23  0556   WBC Thousand/uL 5.81   < > 3.81*   HEMOGLOBIN g/dL 9.8*   < > 9.6*   HEMATOCRIT % 31.6*   < > 30.6*   PLATELETS Thousands/uL 93*   < > 85*   NEUTROS PCT %  --   --  65   LYMPHS PCT %  --   --  17   MONOS PCT %  --   --  16*   EOS PCT %  --   --  2    < > = values in this interval not displayed. Results from last 7 days   Lab Units 11/21/23  0449   SODIUM mmol/L 139   POTASSIUM mmol/L 3.5   CHLORIDE mmol/L 100   CO2 mmol/L 30   BUN mg/dL 62*   CREATININE mg/dL 2.13*   ANION GAP mmol/L 9   CALCIUM mg/dL 8.6   ALBUMIN g/dL 3.3*   TOTAL BILIRUBIN mg/dL 0.47   ALK PHOS U/L 129*   ALT U/L 4*   AST U/L 16   GLUCOSE RANDOM mg/dL 34*     Results from last 7 days   Lab Units 11/19/23  0556   INR  1.53*     Results from last 7 days   Lab Units 11/21/23  1127 11/21/23  0729 11/20/23  2059 11/20/23  1628 11/20/23  1104 11/20/23  0656 11/19/23  2050 11/19/23  1619 11/19/23  1124 11/19/23  0733 11/19/23  0726 11/18/23  2130   POC GLUCOSE mg/dl 243* 119 155* 121 161* 83 196* 118 110 99 89 243*     Results from last 7 days   Lab Units 11/20/23  0629   HEMOGLOBIN A1C % 8.8*     Results from last 7 days   Lab Units 11/18/23  1617   PROCALCITONIN ng/ml 0.07       Lines/Drains:  Invasive Devices       Peripheral Intravenous Line  Duration             Peripheral IV 11/18/23 Left;Ventral (anterior) Forearm 2 days              Drain  Duration             Pleural Effusion Long-Term Catheter 16 Fr. 105 days                          Imaging: No pertinent imaging reviewed.     Recent Cultures (last 7 days):   Results from last 7 days   Lab Units 11/18/23  3617 11/18/23  1617   BLOOD CULTURE   -- No Growth at 48 hrs. No Growth at 48 hrs. GRAM STAIN RESULT  No Polys or Bacteria seen  --    WOUND CULTURE  1+ Growth of Staphylococcus aureus*  2+ Growth of  --        Last 24 Hours Medication List:   Current Facility-Administered Medications   Medication Dose Route Frequency Provider Last Rate    acetaminophen  650 mg Oral Q6H PRN Lasha Salazar MD      allopurinol  100 mg Oral BID Lasha Salazar MD      ALPRAZolam  0.5 mg Oral HS PRN Lasha Salazar MD      aluminum-magnesium hydroxide-simethicone  30 mL Oral Q6H PRN Lasha Salazar MD      apixaban  2.5 mg Oral BID Lasha Salazar MD      atorvastatin  40 mg Oral Daily With Cally Sahu MD      cefazolin  2,000 mg Intravenous Q12H Phoebe Revankar, DO 2,000 mg (11/21/23 0903)    docusate sodium  100 mg Oral BID Phoebe Revankar, DO      ferrous sulfate  325 mg Oral Daily With Breakfast Lasha Salazar MD      insulin glargine  5 Units Subcutaneous HS Lasha Salazar MD      insulin lispro  1-5 Units Subcutaneous TID AC Lasha Salazar MD      insulin lispro  1-5 Units Subcutaneous HS Lasha Salazar MD      latanoprost  1 drop Both Eyes HS Lasha Salazar MD      magnesium hydroxide  30 mL Oral Daily PRN Lasha Salazar MD      ondansetron  4 mg Intravenous Q6H PRN Lasha Salazar MD      polyethylene glycol  17 g Oral BID Phoebe Revankar, DO      predniSONE  40 mg Oral Daily SUSANA Mcgregor      tamsulosin  0.4 mg Oral Daily With Cally Sahu MD      timolol  1 drop Both Eyes Daily Lasha Salazar MD      torsemide  80 mg Oral Daily Phoebe Revankar, DO      zinc sulfate  220 mg Oral Daily Lasha Salazar MD          Today, Patient Was Seen By: SUSANA Shankar    **Please Note: This note may have been constructed using a voice recognition system. **

## 2023-11-21 NOTE — PLAN OF CARE
Problem: OCCUPATIONAL THERAPY ADULT  Goal: Performs self-care activities at highest level of function for planned discharge setting. See evaluation for individualized goals. Description: Treatment Interventions: ADL retraining, Functional transfer training, UE strengthening/ROM, Endurance training, Cognitive reorientation, Patient/family training, Equipment evaluation/education, Compensatory technique education, Continued evaluation, Energy conservation, Activityengagement  Equipment Recommended: Bedside commode, Shower/Tub chair with back ($)       See flowsheet documentation for full assessment, interventions and recommendations. Note: Limitation: Decreased ADL status, Decreased UE strength, Decreased UE ROM, Decreased endurance, Decreased cognition, Decreased fine motor control, Decreased self-care trans, Decreased high-level ADLs (impaired activity tolerance, pain, sitting tolerance, sitting balance, standing tolerance, standing balance, gen weakness, insight into deficits, forward functional reach)     Assessment: Pt is an 77yo male admitted to [facility] on [facility]/18/23 w/ L LE non-healing wound. Diagnosed w/ L LE cellulitis. Pt lives w/ his son in a [facility]. Pt needs assistance w/ ADL/ IADL and use of RW for functional mobility. Recent DC home from rehab and receives Home PT; [facility] from [facility], pt responsive and oriented to person/place. Able to follow directions w/ + time and encouragement to actively participate. Pt required min A grooming, mod A UB bathing, min A UBD, max A LBD, min A bed mobility supine to sit. Pt required rest breaks and time to participate in ADLs due to SOB and fatigue. Pt is completing ADLs below baseline level of I and would benefit from OT In acute care to maximize functional independence. Recommend level II rehab resource intensity when medically stable pend medical optimization, improved activity tolerance. Will continue to follow 3-5X week in acute care.      Rehab Resource Intensity Level, OT: II (Moderate Resource Intensity)

## 2023-11-21 NOTE — ASSESSMENT & PLAN NOTE
Per son when patient was working with therapy most of his pain seems to be localized to the left ankle  Patient does have history of gout and reported that the pain feels similar  Uric acid level normal.  Although in the setting of an acute gout flare up, levels can be low or normal  X-ray of the left ankle area showed mild diffuse soft tissue swelling of the lower leg  Patient continues to have pain, will start on prednisone 40 mg daily x 5 days for possible gout

## 2023-11-21 NOTE — PLAN OF CARE
Problem: Potential for Falls  Goal: Patient will remain free of falls  Description: INTERVENTIONS:  - Educate patient/family on patient safety including physical limitations  - Instruct patient to call for assistance with activity   - Consult OT/PT to assist with strengthening/mobility   - Keep Call bell within reach  - Keep bed low and locked with side rails adjusted as appropriate  - Keep care items and personal belongings within reach  - Initiate and maintain comfort rounds  - Make Fall Risk Sign visible to staff  - Offer Toileting every 2 Hours, in advance of need  - Initiate/Maintain bed alarm  - Apply yellow socks and bracelet for high fall risk patients  - Consider moving patient to room near nurses station  Outcome: Progressing     Problem: PAIN - ADULT  Goal: Verbalizes/displays adequate comfort level or baseline comfort level  Description: Interventions:  - Encourage patient to monitor pain and request assistance  - Assess pain using appropriate pain scale  - Administer analgesics based on type and severity of pain and evaluate response  - Implement non-pharmacological measures as appropriate and evaluate response  - Consider cultural and social influences on pain and pain management  - Notify physician/advanced practitioner if interventions unsuccessful or patient reports new pain  Outcome: Progressing     Problem: INFECTION - ADULT  Goal: Absence or prevention of progression during hospitalization  Description: INTERVENTIONS:  - Assess and monitor for signs and symptoms of infection  - Monitor lab/diagnostic results  - Monitor all insertion sites, i.e. indwelling lines, tubes, and drains  - Monitor endotracheal if appropriate and nasal secretions for changes in amount and color  - Simpson appropriate cooling/warming therapies per order  - Administer medications as ordered  - Instruct and encourage patient and family to use good hand hygiene technique  - Identify and instruct in appropriate isolation precautions for identified infection/condition  Outcome: Progressing     Problem: Prexisting or High Potential for Compromised Skin Integrity  Goal: Skin integrity is maintained or improved  Description: INTERVENTIONS:  - Identify patients at risk for skin breakdown  - Assess and monitor skin integrity  - Assess and monitor nutrition and hydration status  - Monitor labs   - Assess for incontinence   - Turn and reposition patient  - Assist with mobility/ambulation  - Relieve pressure over bony prominences  - Avoid friction and shearing  - Provide appropriate hygiene as needed including keeping skin clean and dry  - Evaluate need for skin moisturizer/barrier cream  - Collaborate with interdisciplinary team   - Patient/family teaching  - Consider wound care consult   Outcome: Progressing

## 2023-11-21 NOTE — OCCUPATIONAL THERAPY NOTE
Occupational Therapy Evaluation     Patient Name: Racheal Blunt  LDBIC'M Date: 11/21/2023  Problem List  Principal Problem:    Cellulitis of left lower extremity  Active Problems:    Type 2 diabetes mellitus (HCC)    Chronic kidney disease, stage 4 (severe) (HCC)    Chronic atrial fibrillation (HCC)    Chronic anemia    Thrombocytopenia (HCC)    Chronic combined systolic and diastolic CHF (congestive heart failure) (HCC)    Dyslipidemia    BPH (benign prostatic hyperplasia)    Left ankle pain    Past Medical History  Past Medical History:   Diagnosis Date    Atrial fibrillation ([address])     Chronic kidney disease     Coronary artery disease     Diabetes mellitus ([address])     Hyperlipidemia     Hypertension      Past Surgical History  Past Surgical History:   Procedure Laterality Date    CARDIAC CATHETERIZATION N/A 6/30/2023    Procedure: Cardiac pericardiocentesis; Surgeon: Yamileth Hartman DO;  [facility]; Service: Cardiology    CARDIAC CATHETERIZATION N/A 6/30/2023    Procedure: Cardiac RHC; Surgeon: Yamileth Hartman DO;  [facility]; Service: Cardiology    EYE SURGERY      IR CHEST TUBE PLACEMENT  6/24/2023    IR CHEST TUBE PLACEMENT  7/28/2023    IR PLEURAL EFFUSION LONG-TERM CATHETER PLACEMENT  8/7/2023    SKIN CANCER EXCISION      TONSILLECTOMY          11/21/23 0855   OT Last Visit   OT Visit Date 11/21/23  (Tuesday)   Note Type   Note type Evaluation   Pain Assessment   Pain Assessment Tool 0-10   Pain Score No Pain   Restrictions/Precautions   Weight Bearing Precautions Per Order No   Other Precautions Chair Alarm; Bed Alarm;Multiple lines; Fall Risk;Pain;Hard of hearing  (Trupti Beard on room air)   [facility]   [facility]   Home Layout Two level;Performs ADLs on one level; Able to live on main level with bedroom/bathroom; Other (Comment)  (1 MOHINDER/ threshold)   Bathroom Shower/Tub Tub/shower unit   Bathroom Toilet Standard   Bathroom Equipment Grab bars in shower   Bathroom Accessibility Accessible via walker   [place]   Additional Comments Pt reports living w/ his son and aunt   Prior Function   Level of [place] Needs assistance with IADLS   Lives With Son  (and sister in law)   [address] Help From Family;Home health  (Per PT jose roberto, pt DC from Home OT; receiving Home PT)   IADLs Family/Friend/Other provides transportation; Family/Friend/Other provides meals; Family/Friend/Other provides medication management   Falls in the last 6 months 1 to 4   Vocational Retired   Comments Pt reports use of RW for functional mobility PTA and supportive son / HHA assist w/ ADL/ IADL. Lifestyle   Autonomy Pt reports use of RW for functional mobility and supportive son vs HHA assist w/ ADLs as needed. Reciprocal Relationships Pt reports supportive son assists w/ ADL/ IADL. 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 General   Additional Pertinent History Recent admissions and DC to rehab. DC home w/ HHOT/PT, HHA and family support / assist middle of September 2023. Pt presented w/  non healing L LE wound. Family/Caregiver Present No   Additional General Comments Significant PMH impacting his occupational performance includes a-fib, CAD, DM, HTN, IR pleural effusion long term catheter placement (08/7/23), recent B chest tube placement (06 and 07/2023), hx Gout, a-fib. Personal and environmental factors impacting performance includes advanced age, recent admission (s), inability to complete IADLs, difficulty completing ADLs, hard of hearing, multi level home; supports/ strengths include able to have first floor set- up, supportive family, access to AD, DME. Subjective   Subjective "I just want to sleep"   ADL   Where Assessed Edge of bed   Eating Assistance 6  Modified independent   Eating Deficit Setup; Increased time to complete  (+ appetite, + time)   Grooming Assistance 4  Minimal Assistance  (seated unsupported at EOB)   Grooming Deficit Setup;Verbal cueing;Supervision/safety; Increased time to complete   UB Bathing Assistance 3  Moderate Assistance   UB Bathing Deficit Setup;Verbal cueing;Supervision/safety; Increased time to complete  (+ time after set- up seated at EOB w/ encouragement to actively participate. Rest breaks due to fatigue / SOB)   LB Bathing Assistance 2  Maximal Assistance   UB Dressing Assistance 4  Minimal Assistance  (seated unsupported at EOB)   UB Dressing Deficit Setup;Verbal cueing;Supervision/safety; Increased time to complete   LB Dressing Assistance 2  Maximal Assistance   LB Dressing Deficit Don/doff R sock; Don/doff L sock; Setup; Increased time to complete;Supervision/safety;Verbal cueing   Toileting Assistance  Unable to assess  (denied need to void)   Bed Mobility   Supine to Sit 4  Minimal assistance   Additional items Assist x 1; Increased time required;Verbal cues;LE management;HOB elevated; Bedrails  (to pt's L)   Sit to Supine 5  Supervision   Additional items Assist x 1; Increased time required;Verbal cues;HOB elevated; Bedrails   Additional Comments Pt declined OOB to chair despite education /encouragement. Pt supine HOB elevated upon arrival and post eval w/ needs met, call bell in reach and bed alarm activated   Transfers   Sit to Stand Unable to assess   Stand to Sit Unable to assess   Stand pivot Unable to assess   Additional Comments Pt able to sit scoot along EOB w/ B UE support, + time w/ S to his L.  Pt declined sit to stand or OOB despite encouragement /education   Functional Mobility   Additional Comments Will continue to assess   Additional items Rolling walker   Balance   Static Sitting Fair   Activity Tolerance   Activity Tolerance Patient limited by fatigue   Nurse Made Aware spoke w/ RN, Ava/ LILA, Basia Aguiar   RUE Assessment   RUE Assessment X  (R digits flexed)   RUE Strength   RUE Overall Strength Deficits  (generalized weakness / deconditioning; able to participate in ADLs)   LUE Assessment   LUE Assessment X   LUE Strength   LUE Overall Strength Deficits  (generalized weakness / deconditioning; able to participate in ADLs)   Hand Function   Gross Motor Coordination Functional   Fine Motor Coordination Impaired   Sensation   Light Touch Not tested  (responded to light touch; easily aroused by touch. Sleeping upon therapist arrival)   Vision-Basic Assessment   Current Vision Wears glasses all the time   Cognition   Overall Cognitive Status Impaired   Arousal/Participation Responsive;Lethargic   Attention Attends with cues to redirect   Orientation Level Oriented to person;Oriented to place   Memory Decreased recall of recent events;Decreased short term memory   Following Commands Follows one step commands inconsistently   Comments Identified pt by full name and birthdate. Responsive but lethargic; fatigues quickly. Pt required encouragement to actively participate in ADLs. Assessment   Limitation Decreased ADL status; Decreased UE strength;Decreased UE ROM; Decreased endurance;Decreased cognition;Decreased fine motor control;Decreased self-care trans;Decreased high-level ADLs  (impaired activity tolerance, pain, sitting tolerance, sitting balance, standing tolerance, standing balance, gen weakness, insight into deficits, forward functional reach)   Assessment Pt is an 77yo male admitted to [facility] on [facility]23 w/ L LE non-healing wound. Diagnosed w/ L LE cellulitis. Pt lives w/ his son in a [facility]. Pt needs assistance w/ ADL/ IADL and use of RW for functional mobility. Recent DC home from rehab and receives Home PT; DC [facility]. Upon eval, pt responsive and oriented to person/place. Able to follow directions w/ + time and encouragement to actively participate. Pt required min A grooming, mod A UB bathing, min A UBD, max A LBD, min A bed mobility supine to sit. Pt required rest breaks and time to participate in ADLs due to SOB and fatigue. Pt is completing ADLs below baseline level of I and would benefit from OT In acute care to maximize functional independence. Recommend level II rehab resource intensity when medically stable pend medical optimization, improved activity tolerance. Will continue to follow 3-5X week in acute care. Goals   Patient Goals Pt stated that he would like to sleep   Plan   Treatment Interventions ADL retraining;Functional transfer training;UE strengthening/ROM; Endurance training;Cognitive reorientation;Patient/family training;Equipment evaluation/education; Compensatory technique education;Continued evaluation; Energy conservation; Activityengagement   Goal Expiration Date 12/05/23   OT Treatment Day 0  (Tuesday 11/21/23)   OT Frequency 3-5x/wk   Discharge Recommendation   Rehab Resource Intensity Level, OT II (Moderate Resource Intensity)   Equipment Recommended Bedside commode; Shower/Tub chair with back ($)   AM-PAC Daily Activity Inpatient   Lower Body Dressing 2   Bathing 2   Toileting 2   Upper Body Dressing 3   Grooming 3   Eating 4   Daily Activity Raw Score 16   Daily Activity Standardized Score (Calc for Raw Score >=11) 35.96   AM-PAC Applied Cognition Inpatient   Following a Speech/Presentation 2   Understanding Ordinary Conversation 3   Taking Medications 2   Remembering Where Things Are Placed or Put Away 3   Remembering List of 4-5 Errands 2   Taking Care of Complicated Tasks 2   Applied Cognition Raw Score 14   Applied Cognition Standardized Score 32.02   Barthel Index   Feeding 5   Bathing 0   Grooming Score 0   Dressing Score 5   Bladder Score 5   Bowels Score 10   Toilet Use Score 5   Transfers (Bed/Chair) Score 5   Mobility (Level Surface) Score 0   Stairs Score 0   Barthel Index Score 35   End of Consult   Education Provided Yes  (role of OT, active participation in ADLs)   Patient Position at End of Consult Supine;Bed/Chair alarm activated; All needs within reach   Nurse Communication Nurse aware of consult Licensure   [place] License Number  Prisca ROSSANA PalomaresR/L [id number]       The patient's raw score on the AM-PAC Daily Activity Inpatient Short Form is 16. A raw score of less than 19 suggests the patient may benefit from discharge to post-acute rehabilitation services. Please refer to the recommendation of the Occupational Therapist for safe discharge planning.     Pt goals to be met by 12/5/23 to max I w/ ADLs and improve engagement to return home w/ son includes:    -Pt will complete bed mobility supine <> sit w/ mod I in preparation for ADLs    -Pt will complete functional transfers to bed, chair, and toilet using LRAD, DME as needed w/ S    -Pt will consistently engage in functional mobility using LRAD to / from bathroom w/ min A    -Pt will complete LBD w/ Gilles using LHAE as needed to max I and minimize burden of care    -Pt will complete UBD w/ mod I seated    -Pt will demonstrated improved activity and sitting tolerance OOB in chair for all meals    -Pt will demonstrate improved functional standing tolerance for at least 5 minutes w/ at least fair balance while engaged in grooming/ UB bathing w/ S.    -Pt will demonstrate good attention and participation in ongoing eval of functional cognition as appropriate to assist in DC planning    -Pt will demonstrate good attention and understanding EC tech to max I and improve engagement      CHRISTOPHER Lockett/JOHN  [id number]  [id number]

## 2023-11-22 VITALS
BODY MASS INDEX: 22.9 KG/M2 | OXYGEN SATURATION: 97 % | TEMPERATURE: 97.2 F | HEIGHT: 69 IN | HEART RATE: 90 BPM | SYSTOLIC BLOOD PRESSURE: 136 MMHG | RESPIRATION RATE: 18 BRPM | DIASTOLIC BLOOD PRESSURE: 75 MMHG | WEIGHT: 154.6 LBS

## 2023-11-22 LAB
ANION GAP SERPL CALCULATED.3IONS-SCNC: 9 MMOL/L
BUN SERPL-MCNC: 58 MG/DL (ref 5–25)
CALCIUM SERPL-MCNC: 8.4 MG/DL (ref 8.4–10.2)
CHLORIDE SERPL-SCNC: 97 MMOL/L (ref 96–108)
CO2 SERPL-SCNC: 29 MMOL/L (ref 21–32)
CREAT SERPL-MCNC: 2.19 MG/DL (ref 0.6–1.3)
ERYTHROCYTE [DISTWIDTH] IN BLOOD BY AUTOMATED COUNT: 18.6 % (ref 11.6–15.1)
GFR SERPL CREATININE-BSD FRML MDRD: 26 ML/MIN/1.73SQ M
GLUCOSE SERPL-MCNC: 254 MG/DL (ref 65–140)
GLUCOSE SERPL-MCNC: 272 MG/DL (ref 65–140)
GLUCOSE SERPL-MCNC: 276 MG/DL (ref 65–140)
GLUCOSE SERPL-MCNC: 352 MG/DL (ref 65–140)
HCT VFR BLD AUTO: 32 % (ref 36.5–49.3)
HGB BLD-MCNC: 10.1 G/DL (ref 12–17)
MCH RBC QN AUTO: 29.7 PG (ref 26.8–34.3)
MCHC RBC AUTO-ENTMCNC: 31.6 G/DL (ref 31.4–37.4)
MCV RBC AUTO: 94 FL (ref 82–98)
PLATELET # BLD AUTO: 94 THOUSANDS/UL (ref 149–390)
PMV BLD AUTO: 10.1 FL (ref 8.9–12.7)
POTASSIUM SERPL-SCNC: 4.7 MMOL/L (ref 3.5–5.3)
RBC # BLD AUTO: 3.4 MILLION/UL (ref 3.88–5.62)
SODIUM SERPL-SCNC: 135 MMOL/L (ref 135–147)
WBC # BLD AUTO: 5 THOUSAND/UL (ref 4.31–10.16)

## 2023-11-22 PROCEDURE — 97530 THERAPEUTIC ACTIVITIES: CPT

## 2023-11-22 PROCEDURE — 82948 REAGENT STRIP/BLOOD GLUCOSE: CPT

## 2023-11-22 PROCEDURE — 99223 1ST HOSP IP/OBS HIGH 75: CPT | Performed by: INTERNAL MEDICINE

## 2023-11-22 PROCEDURE — 80048 BASIC METABOLIC PNL TOTAL CA: CPT

## 2023-11-22 PROCEDURE — 85027 COMPLETE CBC AUTOMATED: CPT

## 2023-11-22 PROCEDURE — 97116 GAIT TRAINING THERAPY: CPT

## 2023-11-22 PROCEDURE — 99238 HOSP IP/OBS DSCHRG MGMT 30/<: CPT | Performed by: NURSE PRACTITIONER

## 2023-11-22 RX ORDER — CEPHALEXIN 500 MG/1
500 CAPSULE ORAL EVERY 8 HOURS SCHEDULED
Status: DISCONTINUED | OUTPATIENT
Start: 2023-11-22 | End: 2023-11-22

## 2023-11-22 RX ORDER — CEPHALEXIN 500 MG/1
500 CAPSULE ORAL EVERY 8 HOURS SCHEDULED
Status: DISCONTINUED | OUTPATIENT
Start: 2023-11-22 | End: 2023-11-22 | Stop reason: HOSPADM

## 2023-11-22 RX ORDER — INSULIN LISPRO 100 [IU]/ML
1-5 INJECTION, SOLUTION INTRAVENOUS; SUBCUTANEOUS
Refills: 0
Start: 2023-11-22

## 2023-11-22 RX ORDER — CEPHALEXIN 500 MG/1
500 CAPSULE ORAL EVERY 8 HOURS SCHEDULED
Qty: 7 CAPSULE | Refills: 0
Start: 2023-11-22 | End: 2023-11-25

## 2023-11-22 RX ADMIN — CEFAZOLIN SODIUM 2000 MG: 2 SOLUTION INTRAVENOUS at 08:33

## 2023-11-22 RX ADMIN — PREDNISONE 40 MG: 20 TABLET ORAL at 08:03

## 2023-11-22 RX ADMIN — ZINC SULFATE 220 MG (50 MG) CAPSULE 220 MG: CAPSULE at 08:03

## 2023-11-22 RX ADMIN — DOCUSATE SODIUM 100 MG: 100 CAPSULE, LIQUID FILLED ORAL at 08:03

## 2023-11-22 RX ADMIN — ATORVASTATIN CALCIUM 40 MG: 40 TABLET, FILM COATED ORAL at 16:21

## 2023-11-22 RX ADMIN — FERROUS SULFATE TAB 325 MG (65 MG ELEMENTAL FE) 325 MG: 325 (65 FE) TAB at 08:03

## 2023-11-22 RX ADMIN — CEPHALEXIN 500 MG: 500 CAPSULE ORAL at 13:56

## 2023-11-22 RX ADMIN — ALLOPURINOL 100 MG: 100 TABLET ORAL at 08:03

## 2023-11-22 RX ADMIN — TIMOLOL MALEATE 1 DROP: 5 SOLUTION/ DROPS OPHTHALMIC at 08:04

## 2023-11-22 RX ADMIN — INSULIN LISPRO 4 UNITS: 100 INJECTION, SOLUTION INTRAVENOUS; SUBCUTANEOUS at 11:49

## 2023-11-22 RX ADMIN — APIXABAN 2.5 MG: 2.5 TABLET, FILM COATED ORAL at 08:03

## 2023-11-22 RX ADMIN — INSULIN LISPRO 4 UNITS: 100 INJECTION, SOLUTION INTRAVENOUS; SUBCUTANEOUS at 16:21

## 2023-11-22 RX ADMIN — POLYETHYLENE GLYCOL 3350 17 G: 17 POWDER, FOR SOLUTION ORAL at 08:03

## 2023-11-22 RX ADMIN — TORSEMIDE 80 MG: 20 TABLET ORAL at 08:03

## 2023-11-22 RX ADMIN — INSULIN LISPRO 3 UNITS: 100 INJECTION, SOLUTION INTRAVENOUS; SUBCUTANEOUS at 08:02

## 2023-11-22 RX ADMIN — TAMSULOSIN HYDROCHLORIDE 0.4 MG: 0.4 CAPSULE ORAL at 16:21

## 2023-11-22 NOTE — CONSULTS
Consultation - Infectious Disease   Ktaerine Tuttle 78 y.o. male MRN: [id number]  Unit/Bed#: [address] [place] [address] Encounter: [id number]      IMPRESSION & RECOMMENDATIONS:     Wound of Left Lower Extremity:  -Appears this wound first started as a blister, perhaps in setting of lower extremity edema and wearing compression socks with friction. I am unsure if this was ever truly infected. He never had leukocytosis or fever, CT scan negative for abscess. CT alone cannot distinguish between sterile edema and true cellulitis. The superficial wound culture may just be skin rudi/colonization. He remains systemically well. Blood cultures negative. The bruising is likely in setting of his anticoagulation use. He has good ROM of the ankle joint without pain, lower concern for any joint involvement.  -Would recommend patient complete total 7 day course of antibiotic for possible cellulitis/wound infection, through 11/24  -As he is not systemically ill nor bacteremic, can transition today to PO Cephalexin as this will cover MSSA, Streptococcus and skin rudi. The dose based on his renal function will be 500 mg every 8 hours  -Please have wound care re-assess wound today to ensure improving prior to discharge; call ID if there are new concerns  -He will need ongoing wound care which will be most important for healing, edema control with aggressive leg elevation and good glycemic control    CKD stage IV:  -Dose adjust antibiotics as above    Chronic Thrombocytopenia  -Seen by Hematology this admission, he will have follow up with them as outpatient    T2DM  -A1c >8. This is a risk factor for skin/soft tissue infection. -Glycemic control per primary    Afib on AC:  -Patient is on Eliquis. His blood thinner is likely contributing to extensive brusing noted around the wound.       I have discussed the above management plan in detail with the primary service    I have performed an extensive review of the medical records in Epic including review of the notes, radiographs, and laboratory results     HISTORY OF PRESENT ILLNESS:  Reason for Consult: wound infection    HPI: Yared Ojeda is a 78y.o. year old male with CKD stage IV, chronic thrombocytopenia, T2DM, Afib, BPH, gout who first presented to ED on 11/18 for evaluation of an open blister on his left lower leg. He reported use of pressure socks at home for chronic leg edema. His visiting home nurse had noted his leg wound and suggested he be evaluated for possible infection. Patient denied having any fevers at home. In ED noted to have a 9x8 cm area that had been a blister that popped with small blistered areas adjacent to it. He was started on antibiotics by the admitting team for possible cellulitis. CT scan was negative for abscess, though non-contrast. A superficial wound culture from 11/18 grew 1+ MSSA and 2+ skin rudi. Blood cultures negative. Patient is poor historian, does not remember how long ago blister developed. He denies any fever or chills, obvious trauma to the area or preceding wound/injury before th blister formed. Denies any major pain or seeing purulence. He does endorse having leg swelling at home and raises his legs to help it improve. No major leg pain today. REVIEW OF SYSTEMS:  A complete review of systems is negative other than that noted in the HPI. PAST MEDICAL HISTORY:  Past Medical History:   Diagnosis Date    Atrial fibrillation ([address])     Chronic kidney disease     Coronary artery disease     Diabetes mellitus ([address])     Hyperlipidemia     Hypertension      Past Surgical History:   Procedure Laterality Date    CARDIAC CATHETERIZATION N/A 6/30/2023    Procedure: Cardiac pericardiocentesis; Surgeon: Carlos Campos DO;  [facility]; Service: Cardiology    CARDIAC CATHETERIZATION N/A 6/30/2023    Procedure: Cardiac RHC; Surgeon: Carlos Campos DO;  [facility];   Service: Cardiology    EYE SURGERY      IR CHEST TUBE PLACEMENT  2023    IR CHEST TUBE PLACEMENT  2023    IR PLEURAL EFFUSION LONG-TERM CATHETER PLACEMENT  2023    SKIN CANCER EXCISION      TONSILLECTOMY         FAMILY HISTORY:  Non-contributory    SOCIAL HISTORY:  Social History   Social History     Substance and Sexual Activity   Alcohol Use Not Currently    Alcohol/week: 0.0 standard drinks of alcohol    Comment: special occasions     Social History     Substance and Sexual Activity   Drug Use Not Currently     Social History     Tobacco Use   Smoking Status Former   Smokeless Tobacco Former       ALLERGIES:  Allergies   Allergen Reactions    Elemental Sulfur     Sulfa Antibiotics        MEDICATIONS:  All current active medications have been reviewed.     PHYSICAL EXAM:  Temp:  [95.8 °F (35.4 °C)-97.1 °F (36.2 °C)] 96.5 °F (35.8 °C)  HR:  [76-90] 90  Resp:  [16-20] 18  BP: (132-141)/(75-80) 136/75  SpO2:  [97 %-100 %] 97 %  Temp (24hrs), Av.6 °F (35.9 °C), Min:95.8 °F (35.4 °C), Max:97.1 °F (36.2 °C)  Current: Temperature: (!) 96.5 °F (35.8 °C)    Intake/Output Summary (Last 24 hours) at 2023 5667  Last data filed at 2023 0700  Gross per 24 hour   Intake --   Output 850 ml   Net -850 ml       General Appearance:  Appearing well, nontoxic, and in no distress   Head:  Normocephalic, without obvious abnormality, atraumatic   Eyes:  Conjunctiva pink and sclera anicteric, both eyes   Nose: Nares normal, mucosa normal, no drainage   Throat: Oropharynx moist without lesions   Neck: Supple   Back:   Symmetric   Lungs:   Clear to auscultation bilaterally, respirations unlabored   Chest Wall:  No tenderness or deformity   Heart:  RRR; no murmur   Abdomen:   Soft, non-tender    Extremities: Left leg with pitting edema below knee and involving the ankle; good ROM of ankle without pain   Skin: Distal left leg is wrapped with dry bandage; per media picture from  there is a wound with granulation tissue in base and surrounding ecchymotic changes   Lymph nodes: Cervical, supraclavicular nodes normal   Neurologic: Alert and awake       LABS, IMAGING, & OTHER STUDIES:  Lab Results:  I have personally reviewed pertinent labs. Results from last 7 days   Lab Units 11/22/23 0441 11/21/23 0449 11/20/23  0629   WBC Thousand/uL 5.00 5.81 4.10*   HEMOGLOBIN g/dL 10.1* 9.8* 9.6*   PLATELETS Thousands/uL 94* 93* 81*     Results from last 7 days   Lab Units 11/22/23 0441 11/21/23 0449 11/20/23  0629 11/19/23  0556   SODIUM mmol/L 135 139 137 137   POTASSIUM mmol/L 4.7 3.5 3.9 3.6   CHLORIDE mmol/L 97 100 99 99   CO2 mmol/L 29 30 32 32   BUN mg/dL 58* 62* 66* 76*   CREATININE mg/dL 2.19* 2.13* 2.14* 2.09*   EGFR ml/min/1.73sq m 26 27 27 28   CALCIUM mg/dL 8.4 8.6 8.3* 8.2*   AST U/L  --  16 14 14   ALT U/L  --  4* 9 13   ALK PHOS U/L  --  129* 121* 123*     Results from last 7 days   Lab Units 11/18/23  2139 11/18/23  1617   BLOOD CULTURE   --  No Growth at 72 hrs. No Growth at 72 hrs. GRAM STAIN RESULT  No Polys or Bacteria seen  --    WOUND CULTURE  1+ Growth of Staphylococcus aureus*  2+ Growth of  --      Results from last 7 days   Lab Units 11/18/23  1617   PROCALCITONIN ng/ml 0.07         Imaging Studies:   I have personally reviewed pertinent imaging study reports and images in PACS. Other Studies:   I have personally reviewed pertinent reports.       Carson Del Angel MD  [facility]

## 2023-11-22 NOTE — DISCHARGE SUMMARY
[address]  Discharge- Celestine Villarreal 1940, 78 y.o. male MRN: [id number]  Unit/Bed#: [address] [place] [address] Encounter: [id number]  Primary Care Provider: Sheila White DO   Date and time admitted to hospital: 11/18/2023  3:27 PM    * Cellulitis of left lower extremity  Assessment & Plan  Left lower extremity cellulitis:  - CT LLE -diffuse subcutaneous soft tissue edema/ inflammatory change throughout the distal left lower extremity consistent with cellulitis. .   - was given a dose of IV Ceftriaxone in ED  - Continue high dose of IV Ancef 2 gm Q 12 hrs (renally dosed)  Wound culture preliminary results positive for Staph aureus. Awaiting final result for sensitivities  Consult ID - recommendations appreciated  Complete course of keflex   Continue local wound care. Left ankle pain  Assessment & Plan  Per son when patient was working with therapy most of his pain seems to be localized to the left ankle  Patient does have history of gout and reported that the pain feels similar  Uric acid level normal.   X-ray of the left ankle area showed mild diffuse soft tissue swelling of the lower leg  Pain improved, hyperglycemic secondary to steroids  Discontinue steroids and monitor     BPH (benign prostatic hyperplasia)  Assessment & Plan  Continue Flomax 0.4 mg    Dyslipidemia  Assessment & Plan  Continue statin 40 mg       Chronic combined systolic and diastolic CHF (congestive heart failure) (HCC)  Assessment & Plan  EF of 40%  Continue Demadex.        Thrombocytopenia ([address])  Assessment & Plan  Patient with thrombocytopenia, anemia and also with some leukopenia  -Counts are low but acceptable  -Patient seen by hematology and patient to follow-up after discharge for further evaluation including possible bone marrow biopsy  - Continue Eliquis for now 2.5 mg po bid   Rpt labs in AM        Chronic anemia  Assessment & Plan  Chronic anemia:  - Hemoglobin remains stable  - rpt labwork in AM    Results from last 7 days   Lab Units 11/22/23  0441 11/21/23  0449 11/20/23  0629 11/19/23  0556 11/19/23  0310 11/18/23  1617   HEMOGLOBIN g/dL 10.1* 9.8* 9.6* 9.6* 9.7* 9.9*   HEMATOCRIT % 32.0* 31.6* 30.5* 30.6* 31.0* 31.4*   MCV fL 94 94 95 94 95 94          Chronic atrial fibrillation (HCC)  Assessment & Plan  continue Eliquis 2.5 mg BID    Chronic kidney disease, stage 4 (severe) (HCC)  Assessment & Plan  Lab Results   Component Value Date    EGFR 26 11/22/2023    EGFR 27 11/21/2023    EGFR 27 11/20/2023    CREATININE 2.19 (H) 11/22/2023    CREATININE 2.13 (H) 11/21/2023    CREATININE 2.14 (H) 11/20/2023     Per outpatient nephrology note baseline creatinine is 2.5-3  Creatinine continues to remain at baseline. Off IV fluids  Repeat lab work in a.m. Type 2 diabetes mellitus [facility])  Assessment & Plan  Lab Results   Component Value Date    HGBA1C 8.8 (H) 11/20/2023       Recent Labs     11/21/23  2050 11/22/23  0632 11/22/23  0729 11/22/23  1137   POCGLU 325* 254* 276* 352*       Continue SSI, lantus. Blood sugar stable. Holding po meds januvia and glimepiride   Update A1C      Discharging Physician / Practitioner: Mery Bowen, [address] [place] [address]  PCP: Desmond Nichole DO  Admission Date: 11/18/2023  Discharge Date: 11/22/23    Reason for Admission: Leg Swelling (Patient brought by son with whom he resides. Patient has visiting nurse for left lower leg wound for about 1-2 weeks. Son states visiting nurse stated he needs ABX.  Patient c/o ankle pain. ) and Wound Infection        Medical Problems       Resolved Problems  Date Reviewed: 11/22/2023   None         Consultations During Hospital Stay:  Carolina Rack TO HEMATOLOGY  IP CONSULT TO INFECTIOUS DISEASES    Procedures Performed:     none    Significant Findings / Test Results:       Results from last 7 days   Lab Units 11/22/23  0441 11/21/23 0449 11/20/23  0629   WBC Thousand/uL 5.00 5.81 4.10*   HEMOGLOBIN g/dL 10.1* 9.8* 9.6*   PLATELETS Thousands/uL 94* 93* 81* Results from last 7 days   Lab Units 11/22/23  0441 11/21/23  0449 11/20/23  0629 11/19/23  0556   SODIUM mmol/L 135 139 137 137   POTASSIUM mmol/L 4.7 3.5 3.9 3.6   CHLORIDE mmol/L 97 100 99 99   CO2 mmol/L 29 30 32 32   BUN mg/dL 58* 62* 66* 76*   CREATININE mg/dL 2.19* 2.13* 2.14* 2.09*   CALCIUM mg/dL 8.4 8.6 8.3* 8.2*   TOTAL BILIRUBIN mg/dL  --  0.47 0.48 0.57   ALK PHOS U/L  --  129* 121* 123*   ALT U/L  --  4* 9 13   AST U/L  --  16 14 14     Results from last 7 days   Lab Units 11/19/23  0556   INR  1.53*         Lab Results   Component Value Date/Time    HGBA1C 8.8 (H) 11/20/2023 06:29 AM     Results from last 7 days   Lab Units 11/22/23  1137 11/22/23  0729 11/22/23  0632 11/21/23 2050 11/21/23  1620 11/21/23  1127 11/21/23  0729 11/20/23  2059 11/20/23  1628 11/20/23  1104 11/20/23  0656 11/19/23 2050   POC GLUCOSE mg/dl 352* 276* 254* 325* 264* 243* 119 155* 121 161* 83 196*     Results from last 7 days   Lab Units 11/18/23  1617   PROCALCITONIN ng/ml 0.07     Blood Culture:   Lab Results   Component Value Date    BLOODCX No Growth at 72 hrs. 11/18/2023    BLOODCX No Growth at 72 hrs. 11/18/2023    BLOODCX No Growth After 5 Days. 07/27/2023    BLOODCX No Growth After 5 Days. 07/27/2023    BLOODCX No Growth After 5 Days. 06/23/2023    BLOODCX Micrococcus luteus (A) 06/23/2023     Urine Culture:   Lab Results   Component Value Date    URINECX 10,000-19,000 cfu/ml 07/27/2023     Sputum Culture: No components found for: "Dakota Jensen"  Wound Culture:   Lab Results   Component Value Date    WOUNDCULT 1+ Growth of Staphylococcus aureus (A) 11/18/2023    WOUNDCULT 2+ Growth of 11/18/2023        CT lower extremity wo contrast left   Final Result by Sesar Monge MD (11/18 9905)      Diffuse subcutaneous soft tissue edema and inflammatory change throughout the distal left lower extremity consistent with cellulitis. .         Workstation performed: [id number]         XR tibia fibula 2 views LEFT   Final Result by Tereasa Goldberg, MD (11/19 1904)      No acute osseous abnormality. Workstation performed: [id number]         XR ankle 3+ views LEFT   Final Result by Tereasa Goldberg, MD (11/19 1923)      No acute osseous abnormality. Mild diffuse soft tissue swelling in the lower leg. No soft tissue gas. Workstation performed: [id number]                Incidental Findings:        Test Results Pending at Discharge (will require follow up): Outpatient Tests Requested:      Complications:      Reason for Admission:   Chief Complaint   Patient presents with    Leg Swelling     Patient brought by son with whom he resides. Patient has visiting nurse for left lower leg wound for about 1-2 weeks. Son states visiting nurse stated he needs ABX. Patient c/o ankle pain. Wound Infection       Hospital Course:     Per HPI: Simona Elizondo is a 78 y.o. male patient with a PMH of atrial fibrillation, CKD, CAD, DM, HTN, HLP who originally presented to the hospital on 11/18/2023 due to lower leg wound. On admission patient was noted to have thrombocytopenia for which hematology consultation was obtained. Hematology consultation appreciated, likely anemia secondary to chronic renal insufficiency. It is recommended that patient follow-up as an outpatient for hematologic workup. Patient had a CT of the lower extremity which revealed subcutaneous soft tissue edema and inflammatory changes consistent with cellulitis. Patient was initially started on Ancef. ID consultation was obtained and recommendations were received to complete a course of Keflex. Wound continued to improve clinically, see media images. Patient will be discharged to [facility] for subacute rehab. Family was updated on day of discharge. Hospital Course:    Please see above list of diagnoses and related plan for additional information.      Condition at Discharge: fair       Discharge Day Visit / Exam: Subjective: Patient sitting up in bedside chair. He denies any pain or discomfort, states that he is feeling better. He is very anxious to leave the hospital.  Vitals: Blood Pressure: 136/75 (11/22/23 0748)  Pulse: 90 (11/22/23 0748)  Temperature: (!) 97.2 °F (36.2 °C) (11/22/23 0900)  Temp Source: Axillary (11/22/23 0328)  Respirations: 18 (11/22/23 0748)  Height: 5' 9" (175.3 cm) (11/18/23 2337)  Weight - Scale: 70.1 kg (154 lb 9.6 oz) (11/18/23 2337)  SpO2: 97 % (11/22/23 0748)  Exam:   Physical Exam  Vitals and nursing note reviewed. Constitutional:       Appearance: Normal appearance. HENT:      Head: Normocephalic. Nose: Nose normal.   Eyes:      Extraocular Movements: Extraocular movements intact. Cardiovascular:      Rate and Rhythm: Normal rate and regular rhythm. Pulmonary:      Effort: Pulmonary effort is normal.   Abdominal:      General: Abdomen is flat. Bowel sounds are normal. There is no distension. Palpations: Abdomen is soft. Tenderness: There is no abdominal tenderness. Musculoskeletal:         General: No swelling. Normal range of motion. Skin:     General: Skin is warm and dry. Neurological:      General: No focal deficit present. Mental Status: He is alert and oriented to person, place, and time. Psychiatric:         Mood and Affect: Mood normal.         Behavior: Behavior normal.           Discharge instructions/Information to patient and family:   See after visit summary for information provided to patient and family. Provisions for Follow-Up Care:  See after visit summary for information related to follow-up care and any pertinent home health orders. Disposition:     Other [facility]    Planned Readmission: none     Discharge Statement:  I spent 30 minutes discharging the patient. This time was spent on the day of discharge. I had direct contact with the patient on the day of discharge.  Greater than 50% of the total time was spent examining patient, answering all patient questions, arranging and discussing plan of care with patient as well as directly providing post-discharge instructions. Additional time then spent on discharge activities. Discharge Medications:  See after visit summary for reconciled discharge medications provided to patient and family.       ** Please Note: This note has been constructed using a voice recognition system **

## 2023-11-22 NOTE — ASSESSMENT & PLAN NOTE
Per son when patient was working with therapy most of his pain seems to be localized to the left ankle  Patient does have history of gout and reported that the pain feels similar  Uric acid level normal.   X-ray of the left ankle area showed mild diffuse soft tissue swelling of the lower leg  Pain improved, hyperglycemic secondary to steroids  Discontinue steroids and monitor

## 2023-11-22 NOTE — ASSESSMENT & PLAN NOTE
Chronic anemia:  - Hemoglobin remains stable  - rpt labwork in AM    Results from last 7 days   Lab Units 11/22/23  0441 11/21/23  0449 11/20/23  0629 11/19/23  0556 11/19/23  0310 11/18/23  1617   HEMOGLOBIN g/dL 10.1* 9.8* 9.6* 9.6* 9.7* 9.9*   HEMATOCRIT % 32.0* 31.6* 30.5* 30.6* 31.0* 31.4*   MCV fL 94 94 95 94 95 94

## 2023-11-22 NOTE — PLAN OF CARE
Problem: Potential for Falls  Goal: Patient will remain free of falls  Description: INTERVENTIONS:  - Educate patient/family on patient safety including physical limitations  - Instruct patient to call for assistance with activity   - Consult OT/PT to assist with strengthening/mobility   - Keep Call bell within reach  - Keep bed low and locked with side rails adjusted as appropriate  - Keep care items and personal belongings within reach  - Initiate and maintain comfort rounds  - Make Fall Risk Sign visible to staff  - Offer Toileting every  Hours, in advance of need  - Initiate/Maintain alarm  - Obtain necessary fall risk management equipment:   - Apply yellow socks and bracelet for high fall risk patients  - Consider moving patient to room near nurses station  Outcome: Progressing     Problem: PAIN - ADULT  Goal: Verbalizes/displays adequate comfort level or baseline comfort level  Description: Interventions:  - Encourage patient to monitor pain and request assistance  - Assess pain using appropriate pain scale  - Administer analgesics based on type and severity of pain and evaluate response  - Implement non-pharmacological measures as appropriate and evaluate response  - Consider cultural and social influences on pain and pain management  - Notify physician/advanced practitioner if interventions unsuccessful or patient reports new pain  Outcome: Progressing     Problem: INFECTION - ADULT  Goal: Absence or prevention of progression during hospitalization  Description: INTERVENTIONS:  - Assess and monitor for signs and symptoms of infection  - Monitor lab/diagnostic results  - Monitor all insertion sites, i.e. indwelling lines, tubes, and drains  - Monitor endotracheal if appropriate and nasal secretions for changes in amount and color  - [place] appropriate cooling/warming therapies per order  - Administer medications as ordered  - Instruct and encourage patient and family to use good hand hygiene technique  - Identify and instruct in appropriate isolation precautions for identified infection/condition  Outcome: Progressing     Problem: Prexisting or High Potential for Compromised Skin Integrity  Goal: Skin integrity is maintained or improved  Description: INTERVENTIONS:  - Identify patients at risk for skin breakdown  - Assess and monitor skin integrity  - Assess and monitor nutrition and hydration status  - Monitor labs   - Assess for incontinence   - Turn and reposition patient  - Assist with mobility/ambulation  - Relieve pressure over bony prominences  - Avoid friction and shearing  - Provide appropriate hygiene as needed including keeping skin clean and dry  - Evaluate need for skin moisturizer/barrier cream  - Collaborate with interdisciplinary team   - Patient/family teaching  - Consider wound care consult   Outcome: Progressing     Problem: Nutrition/Hydration-ADULT  Goal: Nutrient/Hydration intake appropriate for improving, restoring or maintaining nutritional needs  Description: Monitor and assess patient's nutrition/hydration status for malnutrition. Collaborate with interdisciplinary team and initiate plan and interventions as ordered. Monitor patient's weight and dietary intake as ordered or per policy. Utilize nutrition screening tool and intervene as necessary. Determine patient's food preferences and provide high-protein, high-caloric foods as appropriate.      INTERVENTIONS:  - Monitor oral intake, urinary output, labs, and treatment plans  - Assess nutrition and hydration status and recommend course of action  - Evaluate amount of meals eaten  - Assist patient with eating if necessary   - Allow adequate time for meals  - Recommend/ encourage appropriate diets, oral nutritional supplements, and vitamin/mineral supplements  - Order, calculate, and assess calorie counts as needed  - Recommend, monitor, and adjust tube feedings and TPN/PPN based on assessed needs  - Assess need for intravenous fluids  - Provide specific nutrition/hydration education as appropriate  - Include patient/family/caregiver in decisions related to nutrition  Outcome: Progressing Island Pedicle Flap-Requiring Vessel Identification Text: The defect edges were debeveled with a #15 scalpel blade.  Given the location of the defect, shape of the defect and the proximity to free margins an island pedicle advancement flap was deemed most appropriate.  Using a sterile surgical marker, an appropriate advancement flap was drawn, based on the axial vessel mentioned above, incorporating the defect, outlining the appropriate donor tissue and placing the expected incisions within the relaxed skin tension lines where possible.    The area thus outlined was incised deep to adipose tissue with a #15 scalpel blade.  The skin margins were undermined to an appropriate distance in all directions around the primary defect and laterally outward around the island pedicle utilizing iris scissors.  There was minimal undermining beneath the pedicle flap.

## 2023-11-22 NOTE — CASE MANAGEMENT
Case Management Discharge Planning Note    Patient name Nisreen Muller  Location 2 [facility] [address] MRN [id number]  : 1940 Date 2023       Current Admission Date: 2023  Current Admission Diagnosis:Cellulitis of left lower extremity   Patient Active Problem List    Diagnosis Date Noted    Left ankle pain 2023    Chronic combined systolic and diastolic CHF (congestive heart failure) ([address]) 2023    Dyslipidemia 2023    BPH (benign prostatic hyperplasia) 2023    Cellulitis of left lower extremity 2023    Constipation 08/10/2023    Pleural effusion exudative 2023    Goals of care, counseling/discussion 2023    Duodenal ulcer 07/10/2023    Encephalopathy 2023    Recent empyema of lung with persistent locuted R hydropneumothorax 2023    Hypoglycemia 2023    Thrombocytopenia ([address]) 2023    Diarrhea 2023    Hypothermia 2023    Septic shock ([address]) 2023    Urinary retention 2023    Macrocytosis 2023    Hyponatremia 2023    Chronic anemia 2023    Chronic kidney disease-mineral and bone disorder 2023    Advanced care planning/counseling discussion 2023    Benign hypertension with CKD (chronic kidney disease) stage IV ([address]) 2022    Persistent proteinuria 2022    COVID-19 2022    Electrolyte abnormality 2022    Cellulitis of left upper extremity 2021    Chronic atrial fibrillation ([address]) 2021    Vitamin D deficiency 10/18/2019    Chronic kidney disease, stage 4 (severe) ([address]) 2019    Chronic combined systolic and diastolic congestive heart failure ([address]) 2019    Recent pericardial effusion 2019    Leg edema 01/10/2019    Type 2 diabetes mellitus ([address]) 01/10/2019    PVC (premature ventricular contraction) 2018    Essential hypertension 2018    Closed traumatic displaced fracture of distal end of left radius with malunion 02/09/2018      LOS (days): 4  Geometric Mean LOS (GMLOS) (days): 3.20  Days to GMLOS:-0.6     OBJECTIVE:  Risk of Unplanned Readmission Score: 33.81         Current admission status: Inpatient   Preferred Pharmacy:   [facility] [address] [place] [address] [address] [place] [address]  Phone: [phone] Fax: [phone]    Primary Care Provider: Ab Kern DO    Primary Insurance: MEDICARE  Secondary Insurance: BLUE CROSS    DISCHARGE DETAILS:    Other Referral/Resources/Interventions Provided:  Referral Comments: CM requested [facility] transportation for 1600 via [employer], awaiting confirmation. Medical team, Leonila Reddy at [facility] and patient's son Brennan Barakat made aware.      Treatment Team Recommendation: Short Term Rehab  Discharge Destination Plan[de-identified] Short Term Rehab ([facility])  Transport at Discharge : Wheelchair van  Dispatcher Contacted: Yes  Number/Name of Dispatcher: [employer]  Transported by [employer] and Unit #): TBD  ETA of Transport (Date): 11/22/23  ETA of Transport (Time): 1600

## 2023-11-22 NOTE — PHYSICAL THERAPY NOTE
PT TREATMENT     11/22/23 1030   PT Last Visit   PT Visit Date 11/22/23   Note Type   Note Type Treatment   Pain Assessment   Pain Assessment Tool Vergara-Baker FACES   Vergara-Baker FACES Pain Rating 6   Pain Location/Orientation Orientation: Left; Location: Leg   Hospital Pain Intervention(s) Repositioned; Ambulation/increased activity   Restrictions/Precautions   Weight Bearing Precautions Per Order No   Other Precautions Chair Alarm; Bed Alarm; Fall Risk;Pain   General   Chart Reviewed Yes   Family/Caregiver Present No   Cognition   Overall Cognitive Status Impaired   Arousal/Participation Cooperative   Attention Attends with cues to redirect   Orientation Level Oriented X4   Following Commands Follows one step commands with increased time or repetition   Subjective   Subjective Pt continues to report that he has pain in his left lower leg/foot with weight bearing   Bed Mobility   Supine to Sit 4  Minimal assistance   Additional items Assist x 1;Verbal cues   Additional Comments to chair   Transfers   Sit to Stand 4  Minimal assistance   Additional items Assist x 1;Verbal cues   Stand to Sit 4  Minimal assistance   Additional items Assist x 1;Verbal cues   Toilet transfer 4  Minimal assistance   Additional items Assist x 1;Verbal cues; Commode   Additional Comments Pt stood sit <> stand several times during session   Ambulation/Elevation   Gait pattern Decreased L stance; Short stride; Step to   Gait Assistance 4  Minimal assist   Additional items Assist x 1;Verbal cues; Tactile cues   Assistive Device Rolling walker   Distance 5 feet and 10 feet   Stair Management Assistance Not tested   Activity Tolerance   Activity Tolerance Patient limited by pain; Patient limited by fatigue   Nurse Made Aware Spoke to CNA re pt was on commode . Assessment   Prognosis Good   Problem List Decreased strength;Decreased endurance; Impaired balance;Decreased mobility; Impaired judgement;Decreased safety awareness;Decreased skin integrity;Pain   Assessment Pt agreeable to get OOB to chair. Once in standing, instructed pt to use his UEs on RW to off load LLE when advancing RLE. Pt able to return demonstrate. Pt walked fwd/bwd x 5 feet , then 10 feet. Pt positioned in chair and transferred to stand to try to increase his walking with use of RW, however when standing, pt had had a BM . Pt returned to sitting and pt stated that he was not finished, so PT brought a commode to pt. Pt transferred to stand at [employer], PT cleaned pt and chair, then transferred pt to commode with Min A taking steps with RW. Pt on commode at end of session to finish BM.   CNA was notified of same. CNA was entering room to mange pt when PT left room. A: Pt tolerated fairly well. Pt is improving with his mobility but limited by pain. P; Cont. as per plan. The patient's AM-[facility] Basic Mobility Inpatient Short Form Raw Score is 16. A Raw score of less than or equal to 16 suggests the patient may benefit from discharge to post-acute rehabilitation services. Please also refer to the recommendation of the Physical Therapist for safe discharge planning. Goals   Patient Goals To walk without pain. Plan   Treatment/Interventions ADL retraining;Functional transfer training;LE strengthening/ROM; Therapeutic exercise; Endurance training;Cognitive reorientation;Patient/family training;Equipment eval/education; Bed mobility;Gait training;Spoke to nursing;Spoke to case management   Progress Progressing toward goals   PT Frequency Other (Comment)  (5/wk)   Discharge Recommendation   Rehab Resource Intensity Level, PT II (Moderate Resource Intensity)   AM-PAC Basic Mobility Inpatient   Turning in Flat Bed Without Bedrails 3   Lying on Back to Sitting on Edge of Flat Bed Without Bedrails 3   Moving Bed to Chair 3   Standing Up From Chair Using Arms 3   Walk in Room 3   Climb 3-5 Stairs With Railing 1   Basic Mobility Inpatient Raw Score 16   Basic Mobility Standardized Score 38.32 Highest Level Of Mobility   JH-HLM Goal 5: Stand one or more mins   JH-HLM Achieved 6: Walk 10 steps or more   Education   Education Provided Assistive device  (use of RW to offload LLE)   Patient Explanation/teachback used;Demonstrates verbal understanding;Reinforcement needed   End of Consult   Patient Position at End of Consult Other (comment)  (on bedside commode with call bell in reach)   End of Consult Comments CNA aware: entering room at end of session   Licensure   [place] License Number  Pastor Pruitt. Jocelyn Atkinson PT  [id number]

## 2023-11-22 NOTE — PLAN OF CARE
Problem: Potential for Falls  Goal: Patient will remain free of falls  Description: INTERVENTIONS:  - Educate patient/family on patient safety including physical limitations  - Instruct patient to call for assistance with activity   - Consult OT/PT to assist with strengthening/mobility   - Keep Call bell within reach  - Keep bed low and locked with side rails adjusted as appropriate  - Keep care items and personal belongings within reach  - Initiate and maintain comfort rounds  - Make Fall Risk Sign visible to staff  - Offer Toileting every  Hours, in advance of need  - Initiate/Maintain alarm  - Obtain necessary fall risk management equipment:  - Apply yellow socks and bracelet for high fall risk patients  - Consider moving patient to room near nurses station  11/22/2023 1529 by Arianne Vo RN  Outcome: Completed  11/22/2023 1036 by Arianne Vo RN  Outcome: Progressing     Problem: PAIN - ADULT  Goal: Verbalizes/displays adequate comfort level or baseline comfort level  Description: Interventions:  - Encourage patient to monitor pain and request assistance  - Assess pain using appropriate pain scale  - Administer analgesics based on type and severity of pain and evaluate response  - Implement non-pharmacological measures as appropriate and evaluate response  - Consider cultural and social influences on pain and pain management  - Notify physician/advanced practitioner if interventions unsuccessful or patient reports new pain  11/22/2023 1529 by Arianne Vo RN  Outcome: Completed  11/22/2023 1036 by Arianne Vo RN  Outcome: Progressing     Problem: INFECTION - ADULT  Goal: Absence or prevention of progression during hospitalization  Description: INTERVENTIONS:  - Assess and monitor for signs and symptoms of infection  - Monitor lab/diagnostic results  - Monitor all insertion sites, i.e. indwelling lines, tubes, and drains  - Monitor endotracheal if appropriate and nasal secretions for changes in amount and color  - [place] appropriate cooling/warming therapies per order  - Administer medications as ordered  - Instruct and encourage patient and family to use good hand hygiene technique  - Identify and instruct in appropriate isolation precautions for identified infection/condition  11/22/2023 1529 by Samanta Gonzalez RN  Outcome: Completed  11/22/2023 1036 by Samanta Gonzalez RN  Outcome: Progressing     Problem: Prexisting or High Potential for Compromised Skin Integrity  Goal: Skin integrity is maintained or improved  Description: INTERVENTIONS:  - Identify patients at risk for skin breakdown  - Assess and monitor skin integrity  - Assess and monitor nutrition and hydration status  - Monitor labs   - Assess for incontinence   - Turn and reposition patient  - Assist with mobility/ambulation  - Relieve pressure over bony prominences  - Avoid friction and shearing  - Provide appropriate hygiene as needed including keeping skin clean and dry  - Evaluate need for skin moisturizer/barrier cream  - Collaborate with interdisciplinary team   - Patient/family teaching  - Consider wound care consult   11/22/2023 1529 by Samanta Gonzalez RN  Outcome: Completed  11/22/2023 1036 by Samanta Gonzalez RN  Outcome: Progressing     Problem: Nutrition/Hydration-ADULT  Goal: Nutrient/Hydration intake appropriate for improving, restoring or maintaining nutritional needs  Description: Monitor and assess patient's nutrition/hydration status for malnutrition. Collaborate with interdisciplinary team and initiate plan and interventions as ordered. Monitor patient's weight and dietary intake as ordered or per policy. Utilize nutrition screening tool and intervene as necessary. Determine patient's food preferences and provide high-protein, high-caloric foods as appropriate.      INTERVENTIONS:  - Monitor oral intake, urinary output, labs, and treatment plans  - Assess nutrition and hydration status and recommend course of action  - Evaluate amount of meals eaten  - Assist patient with eating if necessary   - Allow adequate time for meals  - Recommend/ encourage appropriate diets, oral nutritional supplements, and vitamin/mineral supplements  - Order, calculate, and assess calorie counts as needed  - Recommend, monitor, and adjust tube feedings and TPN/PPN based on assessed needs  - Assess need for intravenous fluids  - Provide specific nutrition/hydration education as appropriate  - Include patient/family/caregiver in decisions related to nutrition  11/22/2023 1529 by Carolyne Copeland RN  Outcome: Completed  11/22/2023 1036 by Carolyne Copeland, RN  Outcome: Progressing

## 2023-11-22 NOTE — ASSESSMENT & PLAN NOTE
Lab Results   Component Value Date    EGFR 26 11/22/2023    EGFR 27 11/21/2023    EGFR 27 11/20/2023    CREATININE 2.19 (H) 11/22/2023    CREATININE 2.13 (H) 11/21/2023    CREATININE 2.14 (H) 11/20/2023     Per outpatient nephrology note baseline creatinine is 2.5-3  Creatinine continues to remain at baseline. Off IV fluids  Repeat lab work in a.m.

## 2023-11-22 NOTE — [place] UNIVERSAL TRANSF[place] FORM
[place] UNIV[place] TRANSFER FOR[place]  (ALL ITEMS MUST BE COMPLETED)    1. TRANSFER FROM: [address] [place] [address] [address]      TRANSFER TO: [facility] [place] 2[place]    2. DATE OF TRANSFER: 11/22/2023                        TIME OF TRANSFER: 1700    3. PATIENT NAME: AFTAB Noe      YOB: 1940                             GENDER: male    4. LANGUAGE:   English    5. PHYSICIAN NAME:  Karina Brenner MD                   PHONE: [phone]. CODE STATUS: Level 3 - DNAR and DNI        Out of Hospital DNR Attached: No    7. :                                      :  Extended Emergency Contact Information  [facility] Contact: [facility]  Address: [address] [place] [address], [facility] [place] [address]  Mobile Phone: [phone]  Relation: Son  Secondary Emergency Contact: Gisselle Edwards  Home Phone: [phone]  Relation: Sister In-Law           Health Care Representative/Proxy:  No           Legal Guardian:  No             NAME OF:           HEALTH CARE REPRESENTATIVE/PROXY:                                         OR           LEGAL GUARDIAN, IF NOT :                                               PHONE:  (Day)           (Night)                        (Cell)    8. REASON FOR TRANSFER: (Must include brief medical history and recent changes in physical function or cognition.) cellulitis of L leg            V/S: /75   Pulse 90   Temp (!) 97.2 °F (36.2 °C)   Resp 18   Ht 5' 9" (1.753 m)   Wt 70.1 kg (154 lb 9.6 oz)   SpO2 97%   BMI 22.83 kg/m²           PAIN: Yes, Rating 3, Site L ankle, and Treatment elevation    9. PRIMARY DIAGNOSIS: Cellulitis of left lower extremity      Secondary Diagnosis:         Pacemaker: No      Internal Defib: No          Mental Health Diagnosis (if Applicable):    10. RESTRAINTS: No     11. RESPIRATORY NEEDS: None    12. ISOLATION/PRECAUTION: None    13. ALLERGY: Elemental sulfur and Sulfa antibiotics    14. SENSORY:       Vision Glasses, Hearing Poor, and Speech Clear    15. SKIN CONDITION: Yes:  Diabetic    16. DIET: Special (describe)diabetic    17. IV ACCESS: None    18. PERSONAL ITEMS SENT WITH PATIENT: Glasses    19. ATTACHED DOCUMENTS: MUST ATTACH CURRENT MEDICATION INFORMATION Face Sheet and Discharge Summary    20. AT RISK ALERTS:Falls        HARM TO: N/A    21. WEIGHT BEARING STATUS:         Left Leg: Full        Right Leg: Full    22. MENTAL STATUS:Alert and Oriented    23. FUNCTION:        Walk: With Help        Transfer: With Help        Toilet: With Help        Feed: Self    24. IMMUNIZATIONS/SCREENING:     Immunization History   Administered Date(s) Administered    Influenza, high dose seasonal 0.7 mL 01/05/2021    Pneumococcal Conjugate 13-Valent 01/13/2019    Tdap 01/05/2021       25. BOWEL: Continent    26. BLADDER: Incontinent    27.  SENDING FACILITY CONTACT: Kelly Yeager                  Title: RN        Unit: 2S        Phone: [phone] [address] (if known):        Title:        Unit:         Phone:         FORM PREFILLED BY (if applicable)       Title:       Unit:        Phone:         FORM COMPLETED BY Kelly Yeager RN      Title:       Phone:

## 2023-11-22 NOTE — PLAN OF CARE
Problem: Potential for Falls  Goal: Patient will remain free of falls  Description: INTERVENTIONS:  - Educate patient/family on patient safety including physical limitations  - Instruct patient to call for assistance with activity   - Consult OT/PT to assist with strengthening/mobility   - Keep Call bell within reach  - Keep bed low and locked with side rails adjusted as appropriate  - Keep care items and personal belongings within reach  - Initiate and maintain comfort rounds  - Make Fall Risk Sign visible to staff  Problem: PAIN - ADULT  Goal: Verbalizes/displays adequate comfort level or baseline comfort level  Description: Interventions:  - Encourage patient to monitor pain and request assistance  - Assess pain using appropriate pain scale  - Administer analgesics based on type and severity of pain and evaluate response  - Implement non-pharmacological measures as appropriate and evaluate response  - Consider cultural and social influences on pain and pain management  - Notify physician/advanced practitioner if interventions unsuccessful or patient reports new pain  Outcome: Progressing     Problem: Prexisting or High Potential for Compromised Skin Integrity  Goal: Skin integrity is maintained or improved  Description: INTERVENTIONS:  - Identify patients at risk for skin breakdown  - Assess and monitor skin integrity  - Assess and monitor nutrition and hydration status  - Monitor labs   - Assess for incontinence   - Turn and reposition patient  - Assist with mobility/ambulation  - Relieve pressure over bony prominences  - Avoid friction and shearing  - Provide appropriate hygiene as needed including keeping skin clean and dry  - Evaluate need for skin moisturizer/barrier cream  - Collaborate with interdisciplinary team   - Patient/family teaching  - Consider wound care consult   Outcome: Progressing     Problem: Nutrition/Hydration-ADULT  Goal: Nutrient/Hydration intake appropriate for improving, restoring or maintaining nutritional needs  Description: Monitor and assess patient's nutrition/hydration status for malnutrition. Collaborate with interdisciplinary team and initiate plan and interventions as ordered. Monitor patient's weight and dietary intake as ordered or per policy. Utilize nutrition screening tool and intervene as necessary. Determine patient's food preferences and provide high-protein, high-caloric foods as appropriate.      INTERVENTIONS:  - Monitor oral intake, urinary output, labs, and treatment plans  - Assess nutrition and hydration status and recommend course of action  - Evaluate amount of meals eaten  - Assist patient with eating if necessary   - Allow adequate time for meals  - Recommend/ encourage appropriate diets, oral nutritional supplements, and vitamin/mineral supplements  - Order, calculate, and assess calorie counts as needed  - Recommend, monitor, and adjust tube feedings and TPN/PPN based on assessed needs  - Assess need for intravenous fluids  - Provide specific nutrition/hydration education as appropriate  - Include patient/family/caregiver in decisions related to nutrition  Outcome: Progressing     - Apply yellow socks and bracelet for high fall risk patients  - Consider moving patient to room near nurses station  Outcome: Progressing

## 2023-11-22 NOTE — ASSESSMENT & PLAN NOTE
Left lower extremity cellulitis:  - CT LLE -diffuse subcutaneous soft tissue edema/ inflammatory change throughout the distal left lower extremity consistent with cellulitis. .   - was given a dose of IV Ceftriaxone in ED  - Continue high dose of IV Ancef 2 gm Q 12 hrs (renally dosed)  Wound culture preliminary results positive for Staph aureus. Awaiting final result for sensitivities  Consult ID - recommendations appreciated  Complete course of keflex   Continue local wound care.

## 2023-11-24 ENCOUNTER — TELEPHONE (OUTPATIENT)
Dept: HEMATOLOGY ONCOLOGY | Facility: CLINIC | Age: 83
End: 2023-11-24

## 2023-11-24 LAB
BACTERIA BLD CULT: NORMAL
BACTERIA BLD CULT: NORMAL
BACTERIA WND AEROBE CULT: ABNORMAL
BACTERIA WND AEROBE CULT: ABNORMAL
GRAM STN SPEC: ABNORMAL

## 2023-11-24 NOTE — TELEPHONE ENCOUNTER
I phoned the patient's son, Alonso Mora, and introduced myself and indicated that I was calling from [facility] regarding his father's upcoming appointment. Alonso Mora and I reviewed that Yao's appointment with Dr. Gay Pelletier is on 1/16 at 0900 in the [facility] office and that this appointment information, including address and phone number, are in [address] [place] [address]. Alonso Mora indicated that he keeps an eye on his father's MyChart and helps him with scheduling. We then reviewed the location of the office on the [employer]. Alonso Mora expressed understanding and was appreciative of the call.

## 2023-11-29 ENCOUNTER — OFFICE VISIT (OUTPATIENT)
Dept: PULMONOLOGY | Facility: MEDICAL CENTER | Age: 83
End: 2023-11-29
Payer: MEDICARE

## 2023-11-29 ENCOUNTER — PREP FOR PROCEDURE (OUTPATIENT)
Dept: INTERVENTIONAL RADIOLOGY/VASCULAR | Facility: CLINIC | Age: 83
End: 2023-11-29

## 2023-11-29 VITALS
DIASTOLIC BLOOD PRESSURE: 70 MMHG | SYSTOLIC BLOOD PRESSURE: 116 MMHG | HEART RATE: 89 BPM | OXYGEN SATURATION: 99 % | RESPIRATION RATE: 12 BRPM | TEMPERATURE: 97.3 F

## 2023-11-29 DIAGNOSIS — J90 PLEURAL EFFUSION: Primary | ICD-10-CM

## 2023-11-29 PROCEDURE — 99213 OFFICE O/P EST LOW 20 MIN: CPT | Performed by: STUDENT IN AN ORGANIZED HEALTH CARE EDUCATION/TRAINING PROGRAM

## 2023-11-29 RX ORDER — INSULIN GLARGINE-YFGN 100 [IU]/ML
INJECTION, SOLUTION SUBCUTANEOUS
COMMUNITY
Start: 2023-11-16

## 2023-11-29 NOTE — PATIENT INSTRUCTIONS
It was a pleasure seeing you today!     IR referral for ASEPT removal    Corky Terry MD  Pulmonary and Critical Care Medicine

## 2023-11-29 NOTE — PROGRESS NOTES
Pulmonary Outpatient Progress Note   Judy Coleman 78 y.o. male MRN: [id number]  11/29/2023      No problem-specific Assessment & Plan notes found for this encounter. Assessment:    Right recurrent exudative pleural effusion with no cytology status post indwelling pleural catheter placed by interventional radiology at [address] [place] [address] over the summer. Over the past 2 months he has had minimal drainage, repeat chest x-ray shows stable effusion. Physical exam shows diminished breath sounds. I do not have an ultrasound in this clinic, unable to tell if there are any loculations. Visiting nursing service tried flushing the catheter, no relief, no change in output. At this point I discussed with the patient and son about catheter removal.  They are in agreement. The alternative is to keep the catheter in place for a prolonged period of time. I discussed the risk and benefits of procedure of removing the indwelling pleural catheter. There is a chance that he may reaccumulate but given the past few months and having minimal drainage, I think he has a lower chance of that happening. Atrial fibrillation on Eliquis  HFpEF  Recent hospitalization for cellulitis    Plan:    Interventional radiology referral for indwelling pleural catheter removal  I discussed the chance of reaccumulation in the future that may require additional drainage versus indwelling catheter. They are aware  Follow-up as needed      History of Present Illness   HPI:    78-year-old gentleman with a past medical history of right-sided complex fusion with exudative criteria status post indwelling pleural catheter over the summer, initially diagnosed with a loculated hydropneumothorax. Initially had a chest tube placed followed by indwelling pleural catheter for drainage. He was last seen in the pulmonary clinic in October and at that time admitted with drainage.   We changed his drainage from 3 times a week to once a week and still minimal drainage. The catheters have been flushed and are still minimal output. Reviewing his culture data, LDH and protein are low, it was lymphocytic in nature. Unfortunately there is no formal diagnosis aside from hydropneumothorax. Review of Systems   Constitutional: Negative. HENT: Negative. Eyes: Negative. Respiratory: Negative. Cardiovascular: Negative. Gastrointestinal: Negative. Endocrine: Negative. Genitourinary: Negative. Musculoskeletal:  Positive for back pain, gait problem and joint swelling. Skin:  Positive for rash. Allergic/Immunologic: Negative. Hematological: Negative. Psychiatric/Behavioral: Negative. Historical Information   Past Medical History:   Diagnosis Date   • Atrial fibrillation [facility])    • Chronic kidney disease    • Coronary artery disease    • Diabetes mellitus ([facility])    • Hyperlipidemia    • Hypertension      Past Surgical History:   Procedure Laterality Date   • CARDIAC CATHETERIZATION N/A 6/30/2023    Procedure: Cardiac pericardiocentesis; Surgeon: Deirdre Salas DO;  Location: [facility] CARD[facility]AC [facility]; Service: Cardiology   • CARDIAC CATHETERIZATION N/A 6/30/2023    Procedure: Cardiac RHC; Surgeon: Deirdre Salas DO;  Location: [facility] CARD[facility]AC [facility];   Service: Cardiology   • EYE SURGERY     • IR CHEST TUBE PLACEMENT  6/24/2023   • IR CHEST TUBE PLACEMENT  7/28/2023   • IR PLEURAL EFFUSION LONG-TERM CATHETER PLACEMENT  8/7/2023   • SKIN CANCER EXCISION     • TONSILLECTOMY       Family History   Problem Relation Age of Onset   • Heart disease Mother    • Diabetes Father    • Vision loss Father    • Cancer Sister    • Diabetes Sister      Social History     Tobacco Use   Smoking Status Never   Smokeless Tobacco Former   Tobacco Comments    Smoked a pipe 50 years ago       Occupational History: retired     Meds/Allergies     Current Outpatient Medications:   •  allopurinol (ZYLOPRIM) 100 mg tablet, Take 100 mg by mouth 2 (two) times a day, Disp: , Rfl:   •  ALPRAZolam (XANAX) 0.5 mg tablet, 0.5 mg daily at bedtime as needed for anxiety or sleep, Disp: , Rfl: 0  •  ascorbic acid (VITAMIN C) 500 MG tablet, Take 1 tablet (500 mg total) by mouth daily, Disp: , Rfl: 0  •  atorvastatin (LIPITOR) 40 mg tablet, Take 1 tablet (40 mg total) by mouth daily with dinner, Disp: 30 tablet, Rfl: 0  •  Blood Pressure Monitor [place], by Does not apply route daily, Disp: 1 Device, Rfl: 0  •  Eliquis 2.5 MG, TAKE ONE TABLET BY MOUTH TWICE A DAY, Disp: 60 tablet, Rfl: 11  •  ferrous sulfate 325 (65 Fe) mg tablet, Take 325 mg by mouth daily with breakfast, Disp: , Rfl:   •  glimepiride (AMARYL) 2 mg tablet, Take 1 tablet (2 mg total) by mouth daily with dinner, Disp: 30 tablet, Rfl: 0  •  glimepiride (AMARYL) 4 mg tablet, Take 1 tablet (4 mg total) by mouth daily with breakfast, Disp: , Rfl: 0  •  insulin glargine (LANTUS) 100 units/mL subcutaneous injection, Inject 5 Units under the skin daily at bedtime, Disp: 10 mL, Rfl: 0  •  insulin lispro (HumaLOG) 100 units/mL injection, Inject 1-5 Units under the skin 3 (three) times a day before meals, Disp: , Rfl: 0  •  insulin lispro (HumaLOG) 100 units/mL injection, Inject 1-5 Units under the skin daily at bedtime, Disp: , Rfl: 0  •  latanoprost (XALATAN) 0.005 % ophthalmic solution, 1 drop daily at bedtime, Disp: , Rfl:   •  Semglee, yfgn, 100 UNIT/ML SOPN, , Disp: , Rfl:   •  tamsulosin (FLOMAX) 0.4 mg, Take 0.4 mg by mouth daily with dinner, Disp: , Rfl:   •  timolol (TIMOPTIC) 0.5 % ophthalmic solution, Administer 1 drop to both eyes daily, Disp: , Rfl:   •  torsemide 40 MG TABS, Take 80 mg by mouth daily Do not start before November 23, 2023., Disp: , Rfl: 0  •  ZINC OXIDE PO, Take by mouth daily, Disp: , Rfl:   •  cholecalciferol (VITAMIN D3) 1,000 units tablet, Take 2 tablets (2,000 Units total) by mouth daily Do not start before July 12, 2023., Disp: 60 tablet, Rfl: 0  •  docusate sodium (COLACE) 100 mg capsule, Take 1 capsule (100 mg total) by mouth 2 (two) times a day as needed for constipation (Patient not taking: Reported on 10/25/2023), Disp: , Rfl: 0  •  sitaGLIPtin (JANUVIA) 25 mg tablet, Take 1 tablet (25 mg total) by mouth daily Do not start before February 28, 2023. (Patient not taking: Reported on 11/29/2023), Disp: 30 tablet, Rfl: 0  Allergies   Allergen Reactions   • Elemental Sulfur    • Sulfa Antibiotics        Vitals: Blood pressure 116/70, pulse 89, temperature (!) 97.3 °F (36.3 °C), temperature source Temporal, resp. rate 12, SpO2 99 %. , There is no height or weight on file to calculate BMI. Oxygen Therapy  SpO2: 99 %    Physical Exam:    GEN: alert and oriented x 3, pleasant and cooperative   HEENT:  Normocephalic, atraumatic, anicteric  NECK: No JVD   HEART: Rate, normal S1 and S2  LUNGS: Diminished at the right base, left side clear  ABDOMEN:  Normoactive bowel sounds, soft, no tenderness, no distention  EXTREMITIES: peripheral pulses palpable; no edema  NEURO: no gross focal findings; cranial nerves grossly intact   SKIN:  Dry, intact, warm to touch    Labs: I have personally reviewed pertinent lab results. No results found for: "IGE"    Imaging and other studies: I have personally reviewed pertinent reports. Pulmonary function testing:  NA    Other Studies: I have personally reviewed pertinent reports.       Maribeth Owens MD  Pulmonary and Critical Care Medicine

## 2023-12-11 ENCOUNTER — HOSPITAL ENCOUNTER (EMERGENCY)
Facility: HOSPITAL | Age: 83
Discharge: NURSING, REHAB OR LONG-TERM CARE | End: 2023-12-11
Attending: EMERGENCY MEDICINE
Payer: MEDICARE

## 2023-12-11 ENCOUNTER — APPOINTMENT (EMERGENCY)
Dept: RADIOLOGY | Facility: HOSPITAL | Age: 83
End: 2023-12-11
Payer: MEDICARE

## 2023-12-11 ENCOUNTER — HOSPITAL ENCOUNTER (OUTPATIENT)
Dept: NON INVASIVE DIAGNOSTICS | Facility: HOSPITAL | Age: 83
Discharge: HOME | End: 2023-12-11
Attending: RADIOLOGY
Payer: MEDICARE

## 2023-12-11 VITALS
BODY MASS INDEX: 23.09 KG/M2 | SYSTOLIC BLOOD PRESSURE: 135 MMHG | HEIGHT: 69 IN | TEMPERATURE: 98.8 F | RESPIRATION RATE: 16 BRPM | HEART RATE: 92 BPM | WEIGHT: 155.87 LBS | DIASTOLIC BLOOD PRESSURE: 62 MMHG | OXYGEN SATURATION: 98 %

## 2023-12-11 VITALS
OXYGEN SATURATION: 96 % | SYSTOLIC BLOOD PRESSURE: 150 MMHG | RESPIRATION RATE: 18 BRPM | DIASTOLIC BLOOD PRESSURE: 70 MMHG | HEART RATE: 110 BPM

## 2023-12-11 DIAGNOSIS — J90 PLEURAL EFFUSION EXUDATIVE: Primary | ICD-10-CM

## 2023-12-11 DIAGNOSIS — J90 PLEURAL EFFUSION: ICD-10-CM

## 2023-12-11 DIAGNOSIS — B33.8 RSV (RESPIRATORY SYNCYTIAL VIRUS INFECTION): Primary | ICD-10-CM

## 2023-12-11 DIAGNOSIS — R06.00 DYSPNEA: ICD-10-CM

## 2023-12-11 LAB
ALBUMIN SERPL BCP-MCNC: 3.3 G/DL (ref 3.5–5)
ALP SERPL-CCNC: 119 U/L (ref 34–104)
ALT SERPL W P-5'-P-CCNC: 9 U/L (ref 7–52)
ANION GAP SERPL CALCULATED.3IONS-SCNC: 8 MMOL/L
APPEARANCE FLD: CLEAR
APTT PPP: 42 SECONDS (ref 23–37)
AST SERPL W P-5'-P-CCNC: 18 U/L (ref 13–39)
BASOPHILS # BLD AUTO: 0.01 THOUSANDS/ÂΜL (ref 0–0.1)
BASOPHILS NFR BLD AUTO: 0 % (ref 0–1)
BILIRUB SERPL-MCNC: 0.6 MG/DL (ref 0.2–1)
BNP SERPL-MCNC: 194 PG/ML (ref 0–100)
BUN SERPL-MCNC: 49 MG/DL (ref 5–25)
CALCIUM ALBUM COR SERPL-MCNC: 8.6 MG/DL (ref 8.3–10.1)
CALCIUM SERPL-MCNC: 8 MG/DL (ref 8.4–10.2)
CARDIAC TROPONIN I PNL SERPL HS: 30 NG/L
CHLORIDE SERPL-SCNC: 92 MMOL/L (ref 96–108)
CO2 SERPL-SCNC: 30 MMOL/L (ref 21–32)
COLOR FLD: YELLOW
CREAT SERPL-MCNC: 2.23 MG/DL (ref 0.6–1.3)
EOSINOPHIL # BLD AUTO: 0.01 THOUSAND/ÂΜL (ref 0–0.61)
EOSINOPHIL NFR BLD AUTO: 0 % (ref 0–6)
ERYTHROCYTE [DISTWIDTH] IN BLOOD BY AUTOMATED COUNT: 20.3 % (ref 11.6–15.1)
FLUAV RNA RESP QL NAA+PROBE: NEGATIVE
FLUBV RNA RESP QL NAA+PROBE: NEGATIVE
GFR SERPL CREATININE-BSD FRML MDRD: 25 ML/MIN/1.73SQ M
GLUCOSE FLD-MCNC: 123 MG/DL
GLUCOSE SERPL-MCNC: 268 MG/DL (ref 65–140)
HCT VFR BLD AUTO: 32.6 % (ref 36.5–49.3)
HGB BLD-MCNC: 10.6 G/DL (ref 12–17)
HISTIOCYTES NFR FLD: 9 %
IMM GRANULOCYTES # BLD AUTO: 0.01 THOUSAND/UL (ref 0–0.2)
IMM GRANULOCYTES NFR BLD AUTO: 0 % (ref 0–2)
INR PPP: 1.55 (ref 0.84–1.19)
LDH FLD L TO P-CCNC: 89 U/L
LYMPHOCYTES # BLD AUTO: 0.39 THOUSANDS/ÂΜL (ref 0.6–4.47)
LYMPHOCYTES NFR BLD AUTO: 10 % (ref 14–44)
LYMPHOCYTES NFR BLD AUTO: 76 %
MCH RBC QN AUTO: 30.5 PG (ref 26.8–34.3)
MCHC RBC AUTO-ENTMCNC: 32.5 G/DL (ref 31.4–37.4)
MCV RBC AUTO: 94 FL (ref 82–98)
MONO+MESO NFR FLD MANUAL: 1 %
MONOCYTES # BLD AUTO: 0.54 THOUSAND/ÂΜL (ref 0.17–1.22)
MONOCYTES NFR BLD AUTO: 11 %
MONOCYTES NFR BLD AUTO: 13 % (ref 4–12)
NEUTROPHILS # BLD AUTO: 3.06 THOUSANDS/ÂΜL (ref 1.85–7.62)
NEUTS SEG NFR BLD AUTO: 3 %
NEUTS SEG NFR BLD AUTO: 77 % (ref 43–75)
NRBC BLD AUTO-RTO: 0 /100 WBCS
PLATELET # BLD AUTO: 101 THOUSANDS/UL (ref 149–390)
PMV BLD AUTO: 8.4 FL (ref 8.9–12.7)
POTASSIUM SERPL-SCNC: 4.6 MMOL/L (ref 3.5–5.3)
PROCALCITONIN SERPL-MCNC: 0.28 NG/ML
PROT SERPL-MCNC: 5.9 G/DL (ref 6.4–8.4)
PROTHROMBIN TIME: 18.8 SECONDS (ref 11.6–14.5)
QRS AXIS: 21 DEGREES
QRSD INTERVAL: 78 MS
QT INTERVAL: 294 MS
QTC INTERVAL: 415 MS
RBC # BLD AUTO: 3.47 MILLION/UL (ref 3.88–5.62)
RSV RNA RESP QL NAA+PROBE: POSITIVE
SARS-COV-2 RNA RESP QL NAA+PROBE: NEGATIVE
SITE: NORMAL
SODIUM SERPL-SCNC: 130 MMOL/L (ref 135–147)
T WAVE AXIS: 82 DEGREES
TOTAL CELLS COUNTED SPEC: 100
VENTRICULAR RATE: 120 BPM
WBC # BLD AUTO: 4.02 THOUSAND/UL (ref 4.31–10.16)
WBC # FLD MANUAL: 26 /UL

## 2023-12-11 PROCEDURE — 87205 SMEAR GRAM STAIN: CPT | Performed by: STUDENT IN AN ORGANIZED HEALTH CARE EDUCATION/TRAINING PROGRAM

## 2023-12-11 PROCEDURE — 89051 BODY FLUID CELL COUNT: CPT | Performed by: STUDENT IN AN ORGANIZED HEALTH CARE EDUCATION/TRAINING PROGRAM

## 2023-12-11 PROCEDURE — 32555 ASPIRATE PLEURA W/ IMAGING: CPT | Performed by: INTERNAL MEDICINE

## 2023-12-11 PROCEDURE — 71045 X-RAY EXAM CHEST 1 VIEW: CPT

## 2023-12-11 PROCEDURE — 88112 CYTOPATH CELL ENHANCE TECH: CPT | Performed by: STUDENT IN AN ORGANIZED HEALTH CARE EDUCATION/TRAINING PROGRAM

## 2023-12-11 PROCEDURE — 99285 EMERGENCY DEPT VISIT HI MDM: CPT

## 2023-12-11 PROCEDURE — 84145 PROCALCITONIN (PCT): CPT | Performed by: EMERGENCY MEDICINE

## 2023-12-11 PROCEDURE — 85025 COMPLETE CBC W/AUTO DIFF WBC: CPT | Performed by: EMERGENCY MEDICINE

## 2023-12-11 PROCEDURE — 87070 CULTURE OTHR SPECIMN AEROBIC: CPT | Performed by: STUDENT IN AN ORGANIZED HEALTH CARE EDUCATION/TRAINING PROGRAM

## 2023-12-11 PROCEDURE — 84484 ASSAY OF TROPONIN QUANT: CPT | Performed by: EMERGENCY MEDICINE

## 2023-12-11 PROCEDURE — 85730 THROMBOPLASTIN TIME PARTIAL: CPT | Performed by: EMERGENCY MEDICINE

## 2023-12-11 PROCEDURE — 96374 THER/PROPH/DIAG INJ IV PUSH: CPT

## 2023-12-11 PROCEDURE — 82945 GLUCOSE OTHER FLUID: CPT | Performed by: STUDENT IN AN ORGANIZED HEALTH CARE EDUCATION/TRAINING PROGRAM

## 2023-12-11 PROCEDURE — 0241U HB NFCT DS VIR RESP RNA 4 TRGT: CPT | Performed by: EMERGENCY MEDICINE

## 2023-12-11 PROCEDURE — 83615 LACTATE (LD) (LDH) ENZYME: CPT | Performed by: STUDENT IN AN ORGANIZED HEALTH CARE EDUCATION/TRAINING PROGRAM

## 2023-12-11 PROCEDURE — 88305 TISSUE EXAM BY PATHOLOGIST: CPT | Performed by: STUDENT IN AN ORGANIZED HEALTH CARE EDUCATION/TRAINING PROGRAM

## 2023-12-11 PROCEDURE — 80053 COMPREHEN METABOLIC PANEL: CPT | Performed by: EMERGENCY MEDICINE

## 2023-12-11 PROCEDURE — 32555 ASPIRATE PLEURA W/ IMAGING: CPT

## 2023-12-11 PROCEDURE — 96375 TX/PRO/DX INJ NEW DRUG ADDON: CPT

## 2023-12-11 PROCEDURE — 99285 EMERGENCY DEPT VISIT HI MDM: CPT | Performed by: EMERGENCY MEDICINE

## 2023-12-11 PROCEDURE — 85610 PROTHROMBIN TIME: CPT | Performed by: EMERGENCY MEDICINE

## 2023-12-11 PROCEDURE — 36415 COLL VENOUS BLD VENIPUNCTURE: CPT | Performed by: EMERGENCY MEDICINE

## 2023-12-11 PROCEDURE — 94640 AIRWAY INHALATION TREATMENT: CPT

## 2023-12-11 PROCEDURE — 83880 ASSAY OF NATRIURETIC PEPTIDE: CPT | Performed by: EMERGENCY MEDICINE

## 2023-12-11 PROCEDURE — 93005 ELECTROCARDIOGRAM TRACING: CPT

## 2023-12-11 RX ORDER — LIDOCAINE WITH 8.4% SOD BICARB 0.9%(10ML)
SYRINGE (ML) INJECTION AS NEEDED
Status: COMPLETED | OUTPATIENT
Start: 2023-12-11 | End: 2023-12-11

## 2023-12-11 RX ORDER — ALBUTEROL SULFATE 2.5 MG/3ML
2.5 SOLUTION RESPIRATORY (INHALATION) ONCE
Status: COMPLETED | OUTPATIENT
Start: 2023-12-11 | End: 2023-12-11

## 2023-12-11 RX ORDER — METOPROLOL TARTRATE 1 MG/ML
5 INJECTION, SOLUTION INTRAVENOUS ONCE
Status: COMPLETED | OUTPATIENT
Start: 2023-12-11 | End: 2023-12-11

## 2023-12-11 RX ORDER — HYDROMORPHONE HCL/PF 1 MG/ML
0.5 SYRINGE (ML) INJECTION ONCE
Status: COMPLETED | OUTPATIENT
Start: 2023-12-11 | End: 2023-12-11

## 2023-12-11 RX ORDER — METHYLPREDNISOLONE SODIUM SUCCINATE 125 MG/2ML
INJECTION, POWDER, LYOPHILIZED, FOR SOLUTION INTRAMUSCULAR; INTRAVENOUS
Status: COMPLETED
Start: 2023-12-11 | End: 2023-12-11

## 2023-12-11 RX ORDER — IPRATROPIUM BROMIDE AND ALBUTEROL SULFATE 2.5; .5 MG/3ML; MG/3ML
3 SOLUTION RESPIRATORY (INHALATION) ONCE
Status: COMPLETED | OUTPATIENT
Start: 2023-12-11 | End: 2023-12-11

## 2023-12-11 RX ADMIN — METOPROLOL TARTRATE 5 MG: 5 INJECTION INTRAVENOUS at 12:33

## 2023-12-11 RX ADMIN — METHYLPREDNISOLONE SODIUM SUCCINATE 125 MG: 125 INJECTION, POWDER, FOR SOLUTION INTRAMUSCULAR; INTRAVENOUS at 14:06

## 2023-12-11 RX ADMIN — HYDROMORPHONE HYDROCHLORIDE 0.5 MG: 1 INJECTION, SOLUTION INTRAMUSCULAR; INTRAVENOUS; SUBCUTANEOUS at 11:57

## 2023-12-11 RX ADMIN — Medication 6 ML: at 10:31

## 2023-12-11 RX ADMIN — ALBUTEROL SULFATE 2.5 MG: 2.5 SOLUTION RESPIRATORY (INHALATION) at 12:32

## 2023-12-11 RX ADMIN — ALTEPLASE 4 MG: 2.2 INJECTION, POWDER, LYOPHILIZED, FOR SOLUTION INTRAVENOUS at 10:57

## 2023-12-11 RX ADMIN — IPRATROPIUM BROMIDE AND ALBUTEROL SULFATE 3 ML: .5; 3 SOLUTION RESPIRATORY (INHALATION) at 11:55

## 2023-12-11 NOTE — ED NOTES
IR RN to bedside to attempt to drain ASEPT cath after TPA instilled as noted above. Per IR RN, no drainage noted from Cath at this time.        Jael Martin RN  12/11/23 [id number]

## 2023-12-11 NOTE — SEDATION DOCUMENTATION
Patient arrived to IR department for asept catheter removal. Upon arrival to department, patient noted to be wheezing. Dr. Shannon Shultz at bedside, refer to provider documentation. Decision was made to keep asept catheter in and perform thoracentesis. Right thoracentesis completed by Dr. Shannon Shultz, 200 ml dark yellow fluid removed. Specimen sent to lab bandaid applied. Alteplase instilled into asept catheter per order. Patient transferred to ED for further evaluation. Report and care assumed by ED RN.

## 2023-12-11 NOTE — BRIEF OP NOTE (RAD/CATH)
INTERVENTIONAL RADIOLOGY PROCEDURE NOTE    Date: 12/11/2023    Procedure:   Procedure Summary       Date: 12/11/23 Room / Location: [address] [place] [address] Cardiac Cath Lab    Anesthesia Start:  Anesthesia Stop:     Procedure: IR THORACENTESIS Diagnosis:       Pleural effusion      (right tunneled pleural drainage catheter no longer needed.)    Scheduled Providers:  Responsible Provider:     Anesthesia Type: Not recorded ASA Status: Not recorded            Preoperative diagnosis:   1. Pleural effusion         Postoperative diagnosis: Same. Surgeon: Evi Cueva MD     Assistant: None. No qualified resident was available. Blood loss: None    Specimens: Right pleural fluid sent to the laboratory for evaluation     Findings: Ultrasound-guided right thoracentesis performed at loculated pleural effusion. Patient arrived to the IR department for tunneled pleural catheter removal however was found to have shortness of breath and wheezing. He denies any fevers or chills or other symptoms of infection. Given symptoms, decision was made to perform a thoracentesis as well as attempt to drain from the tunneled pleural catheter. Thoracentesis yielded 200 mL of pleural fluid which was sent to the lab. The tunneled pleural catheter had minimal drainage therefore 4 mg of tPA was injected into the tube and allowed to dwell for 1-2 hours. Patient was then sent to the ED for further evaluation of shortness of breath. We will plan for future removal of tunneled pleural catheter in 2 weeks. Complications: None immediate.     Anesthesia: local

## 2023-12-11 NOTE — ED NOTES
Pt assisted to get dressed and placed in w.c for discharge, pt was discharged during downtime, son is driving pt back to [facility]. Call placed to [facility] to update on events and rx/plan of care .  Pt left facility without further inicident      Lisandra Mejía RN  12/11/23 [id number]

## 2023-12-11 NOTE — NURSING NOTE
Pt seen in ED, attempted to drain right asept after 1 hour of altepase. No drainage note. Pt states he is feeling better since thoracentesis, denies any pain at this time. Pt currently receiving nebulizer.

## 2023-12-11 NOTE — ED PROVIDER NOTES
History  Chief Complaint   Patient presents with    Shortness of Breath     Patient arrives to ED from IR for c/o SOB. Patient at outpatient IR for tunneled cath removal (not removed), thoracentesis done (200 cc removed). Denies fever. Chest Pain     Patient has a history of pleural effusions and was at IR today for thoracentesis. Patient had 200 cc removed from the chest however still was short of breath. No fever. Patient has a cough and congestion difficulty breathing worse with exertion. Prior to Admission Medications   Prescriptions Last Dose Informant Patient Reported? Taking? ALPRAZolam (XANAX) 0.5 mg tablet  Child Yes No   Si.5 mg daily at bedtime as needed for anxiety or sleep   Blood Pressure Monitor NILTON  Child No No   Sig: by Does not apply route daily   Eliquis 2.5 MG  Child No No   Sig: TAKE ONE TABLET BY MOUTH TWICE A DAY   rylie Alexandergn, 100 UNIT/ML SOPN  Child Yes No   ZINC OXIDE PO  Child Yes No   Sig: Take by mouth daily   allopurinol (ZYLOPRIM) 100 mg tablet  Child Yes No   Sig: Take 100 mg by mouth 2 (two) times a day   ascorbic acid (VITAMIN C) 500 MG tablet  Child No No   Sig: Take 1 tablet (500 mg total) by mouth daily   atorvastatin (LIPITOR) 40 mg tablet  Child No No   Sig: Take 1 tablet (40 mg total) by mouth daily with dinner   cholecalciferol (VITAMIN D3) 1,000 units tablet  Child No No   Sig: Take 2 tablets (2,000 Units total) by mouth daily Do not start before 2023.    docusate sodium (COLACE) 100 mg capsule  Child No No   Sig: Take 1 capsule (100 mg total) by mouth 2 (two) times a day as needed for constipation   Patient not taking: Reported on 10/25/2023   ferrous sulfate 325 (65 Fe) mg tablet  Child Yes No   Sig: Take 325 mg by mouth daily with breakfast   glimepiride (AMARYL) 2 mg tablet  Child No No   Sig: Take 1 tablet (2 mg total) by mouth daily with dinner   glimepiride (AMARYL) 4 mg tablet  Child No No   Sig: Take 1 tablet (4 mg total) by mouth daily with breakfast   insulin glargine (LANTUS) 100 units/mL subcutaneous injection  Child No No   Sig: Inject 5 Units under the skin daily at bedtime   insulin lispro (HumaLOG) 100 units/mL injection  Child No No   Sig: Inject 1-5 Units under the skin 3 (three) times a day before meals   insulin lispro (HumaLOG) 100 units/mL injection  Child No No   Sig: Inject 1-5 Units under the skin daily at bedtime   latanoprost (XALATAN) 0.005 % ophthalmic solution  Child Yes No   Si drop daily at bedtime   sitaGLIPtin (JANUVIA) 25 mg tablet  Child No No   Sig: Take 1 tablet (25 mg total) by mouth daily Do not start before 2023. Patient not taking: Reported on 2023   tamsulosin (FLOMAX) 0.4 mg  Child Yes No   Sig: Take 0.4 mg by mouth daily with dinner   timolol (TIMOPTIC) 0.5 % ophthalmic solution  Child No No   Sig: Administer 1 drop to both eyes daily   torsemide 40 MG TABS  Child No No   Sig: Take 80 mg by mouth daily Do not start before 2023. Facility-Administered Medications: None       Past Medical History:   Diagnosis Date    Atrial fibrillation ([address])     Chronic kidney disease     Coronary artery disease     Diabetes mellitus ([address])     Hyperlipidemia     Hypertension        Past Surgical History:   Procedure Laterality Date    CARDIAC CATHETERIZATION N/A 2023    Procedure: Cardiac pericardiocentesis; Surgeon: Franky Chinchilla DO;  Location: [facility]; Service: Cardiology    CARDIAC CATHETERIZATION N/A 2023    Procedure: Cardiac RHC; Surgeon: Franky Chinchilla DO;  Location: [facility];   Service: Cardiology    EYE SURGERY      IR CHEST TUBE PLACEMENT  2023    IR CHEST TUBE PLACEMENT  2023    IR PLEURAL EFFUSION LONG-TERM CATHETER PLACEMENT  2023    SKIN CANCER EXCISION      TONSILLECTOMY         Family History   Problem Relation Age of Onset    Heart disease Mother     Diabetes Father     Vision loss Father     Cancer Sister Diabetes Sister      I have reviewed and agree with the history as documented. E-Cigarette/Vaping    E-Cigarette Use Never User      E-Cigarette/Vaping Substances    Nicotine No     THC No     CBD No     Flavoring No     Other No     Unknown No      Social History     Tobacco Use    Smoking status: Never    Smokeless tobacco: Former    Tobacco comments:     Smoked a pipe 50 years ago   Vaping Use    Vaping Use: Never used   Substance Use Topics    Alcohol use: Not Currently     Alcohol/week: 0.0 standard drinks of alcohol     Comment: special occasions    Drug use: Not Currently       Review of Systems   Constitutional:  Negative for chills and fever. HENT:  Positive for congestion. Eyes:  Negative for visual disturbance. Respiratory:  Positive for cough and shortness of breath. Cardiovascular:  Positive for chest pain and leg swelling. Gastrointestinal:  Negative for abdominal pain and vomiting. Genitourinary:  Negative for dysuria. Musculoskeletal:  Negative for arthralgias and neck pain. Skin:  Negative for rash. Neurological:  Positive for weakness. Negative for headaches. Hematological:  Does not bruise/bleed easily. Psychiatric/Behavioral:  Negative for confusion. All other systems reviewed and are negative. Physical Exam  Physical Exam  Vitals and nursing note reviewed. Constitutional:       Appearance: He is well-developed. HENT:      Head: Normocephalic. Mouth/Throat:      Mouth: Mucous membranes are moist.   Eyes:      Pupils: Pupils are equal, round, and reactive to light. Cardiovascular:      Rate and Rhythm: Normal rate and regular rhythm. Pulmonary:      Effort: Tachypnea present. Breath sounds: Decreased breath sounds, wheezing and rhonchi present. Chest:      Chest wall: No tenderness. Abdominal:      Palpations: Abdomen is soft. Tenderness: There is no abdominal tenderness. Musculoskeletal:         General: Normal range of motion. Cervical back: Normal range of motion. Right lower leg: Edema present. Left lower leg: Edema present. Skin:     General: Skin is warm and dry. Capillary Refill: Capillary refill takes less than 2 seconds. Neurological:      General: No focal deficit present. Mental Status: He is alert.    Psychiatric:         Mood and Affect: Mood normal.         Vital Signs  ED Triage Vitals   Temperature Pulse Respirations Blood Pressure SpO2   12/11/23 1145 12/11/23 1116 12/11/23 1116 12/11/23 1116 12/11/23 1116   98.3 °F (36.8 °C) (!) 121 (!) 26 (!) 158/108 97 %      Temp Source Heart Rate Source Patient Position - Orthostatic VS BP Location FiO2 (%)   12/11/23 1116 12/11/23 1116 12/11/23 1116 12/11/23 1116 --   Tympanic Monitor Lying Right arm       Pain Score       12/11/23 1116       10 - Worst Possible Pain           Vitals:    12/11/23 1230 12/11/23 1304 12/11/23 1330 12/11/23 1430   BP: 161/67 128/73 123/65 135/62   Pulse: (!) 112 92 92    Patient Position - Orthostatic VS:  Sitting  Lying         Visual Acuity      ED Medications  Medications   ipratropium-albuterol (DUO-NEB) 0.5-2.5 mg/3 mL inhalation solution 3 mL (3 mL Nebulization Given 12/11/23 1155)   HYDROmorphone (DILAUDID) injection 0.5 mg (0.5 mg Intravenous Given 12/11/23 1157)   metoprolol (LOPRESSOR) injection 5 mg (5 mg Intravenous Given 12/11/23 1233)   albuterol inhalation solution 2.5 mg (2.5 mg Nebulization Given 12/11/23 1232)   methylPREDNISolone sodium succinate (Solu-MEDROL) 125 MG injection **ADS Override Pull** (125 mg  Given 12/11/23 1406)       Diagnostic Studies  Results Reviewed       Procedure Component Value Units Date/Time    FLU/RSV/COVID - if FLU/RSV clinically relevant [[id number]]  (Abnormal) Collected: 12/11/23 1155    Lab Status: Final result Specimen: Nares from Nose Updated: 12/11/23 1312     SARS-CoV-2 Negative     INFLUENZA A PCR Negative     INFLUENZA B PCR Negative     RSV PCR Positive    Narrative: FOR PEDIATRIC PATIENTS - copy/paste COVID Guidelines URL to browser: [email or web address]    SARS-CoV-2 assay is a Nucleic Acid Amplification assay intended for the  qualitative detection of nucleic acid from SARS-CoV-2 in nasopharyngeal  swabs. Results are for the presumptive identification of SARS-CoV-2 RNA. Positive results are indicative of infection with SARS-CoV-2, the virus  causing COVID-19, but do not rule out bacterial infection or co-infection  with other viruses. Laboratories within the [facility] and its  territories are required to report all positive results to the appropriate  public health authorities. Negative results do not preclude SARS-CoV-2  infection and should not be used as the sole basis for treatment or other  patient management decisions. Negative results must be combined with  clinical observations, patient history, and epidemiological information. This test has not been FDA cleared or approved. This test has been authorized by FDA under an Emergency Use Authorization  (EUA). This test is only authorized for the duration of time the  declaration that circumstances exist justifying the authorization of the  emergency use of an in vitro diagnostic tests for detection of SARS-CoV-2  virus and/or diagnosis of COVID-19 infection under section 564(b)(1) of  the Act, 21 U. S.C. 956TYL-9(U)(1), unless the authorization is terminated  or revoked sooner. The test has been validated but independent review by FDA  and CLIA is pending. Test performed using [employer] GeneXpert: This RT-PCR assay targets N2,  a region unique to SARS-CoV-2. A conserved region in the E-gene was chosen  for pan-Sarbecovirus detection which includes SARS-CoV-2. According to CMS-2020-01-R, this platform meets the definition of high-throughput technology.     Procalcitonin [[id number]]  (Abnormal) Collected: 12/11/23 1369    Lab Status: Final result Specimen: Blood from Arm, Left Updated: 12/11/23 1234     Procalcitonin 0.28 ng/ml     HS Troponin I 2hr [[id number]]     Lab Status: No result Specimen: Blood     HS Troponin I 4hr [[id number]]     Lab Status: No result Specimen: Blood     HS Troponin 0hr (reflex protocol) [[id number]]  (Normal) Collected: 12/11/23 1155    Lab Status: Final result Specimen: Blood from Arm, Left Updated: 12/11/23 1232     hs TnI 0hr 30 ng/L     B-Type Natriuretic Peptide(BNP) [[id number]]  (Abnormal) Collected: 12/11/23 1155    Lab Status: Final result Specimen: Blood from Arm, Left Updated: 12/11/23 1231      pg/mL     Comprehensive metabolic panel [[id number]]  (Abnormal) Collected: 12/11/23 1155    Lab Status: Final result Specimen: Blood from Arm, Left Updated: 12/11/23 1223     Sodium 130 mmol/L      Potassium 4.6 mmol/L      Chloride 92 mmol/L      CO2 30 mmol/L      ANION GAP 8 mmol/L      BUN 49 mg/dL      Creatinine 2.23 mg/dL      Glucose 268 mg/dL      Calcium 8.0 mg/dL      Corrected Calcium 8.6 mg/dL      AST 18 U/L      ALT 9 U/L      Alkaline Phosphatase 119 U/L      Total Protein 5.9 g/dL      Albumin 3.3 g/dL      Total Bilirubin 0.60 mg/dL      eGFR 25 ml/min/1.73sq m     Narrative:      [facility] guidelines for Chronic Kidney Disease (CKD):     Stage 1 with normal or high GFR (GFR > 90 mL/min/1.73 square meters)    Stage 2 Mild CKD (GFR = 60-89 mL/min/1.73 square meters)    Stage 3A Moderate CKD (GFR = 45-59 mL/min/1.73 square meters)    Stage 3B Moderate CKD (GFR = 30-44 mL/min/1.73 square meters)    Stage 4 Severe CKD (GFR = 15-29 mL/min/1.73 square meters)    Stage 5 End Stage CKD (GFR <15 mL/min/1.73 square meters)  Note: GFR calculation is accurate only with a steady state creatinine    Protime-INR [[id number]]  (Abnormal) Collected: 12/11/23 1155    Lab Status: Final result Specimen: Blood from Arm, Left Updated: 12/11/23 1220     Protime 18.8 seconds      INR 1.55    APTT [[id number]] (Abnormal) Collected: 12/11/23 1155    Lab Status: Final result Specimen: Blood from Arm, Left Updated: 12/11/23 1220     PTT 42 seconds     CBC and differential [[id number]]  (Abnormal) Collected: 12/11/23 1155    Lab Status: Final result Specimen: Blood from Arm, Left Updated: 12/11/23 1204     WBC 4.02 Thousand/uL      RBC 3.47 Million/uL      Hemoglobin 10.6 g/dL      Hematocrit 32.6 %      MCV 94 fL      MCH 30.5 pg      MCHC 32.5 g/dL      RDW 20.3 %      MPV 8.4 fL      Platelets 687 Thousands/uL      nRBC 0 /100 WBCs      Neutrophils Relative 77 %      Immat GRANS % 0 %      Lymphocytes Relative 10 %      Monocytes Relative 13 %      Eosinophils Relative 0 %      Basophils Relative 0 %      Neutrophils Absolute 3.06 Thousands/µL      Immature Grans Absolute 0.01 Thousand/uL      Lymphocytes Absolute 0.39 Thousands/µL      Monocytes Absolute 0.54 Thousand/µL      Eosinophils Absolute 0.01 Thousand/µL      Basophils Absolute 0.01 Thousands/µL                    XR chest 1 view portable   Final Result by Montse Garcia MD (12/11 1634)      Unchanged moderate right and small left pleural effusions, without pneumothorax. Unchanged bibasilar atelectasis. Otherwise clear lungs.                      Workstation performed: [id number]                    Procedures  ECG 12 Lead Documentation Only    Date/Time: 12/11/2023 11:20 AM    Performed by: Steve Cortez MD  Authorized by: Steve Cortez MD    Indications / Diagnosis:  SOB  ECG reviewed by me, the ED Provider: yes    Patient location:  ED  Interpretation:     Interpretation: abnormal    Rate:     ECG rate:  120    ECG rate assessment: tachycardic    Rhythm:     Rhythm: atrial fibrillation    Ectopy:     Ectopy: PVCs      PVCs:  Infrequent  QRS:     QRS axis:  Normal    QRS intervals:  Normal  Conduction:     Conduction: normal    ST segments:     ST segments:  Normal  T waves:     T waves: non-specific             ED Course SBIRT 20yo+      Flowsheet Row Most Recent Value   Initial Alcohol Screen: US AUDIT-C     1. How often do you have a drink containing alcohol? 0 Filed at: 12/11/2023 1111   2. How many drinks containing alcohol do you have on a typical day you are drinking? 0 Filed at: 12/11/2023 1111   3a. Male UNDER 65: How often do you have five or more drinks on one occasion? 0 Filed at: 12/11/2023 1111   3b. FEMALE Any Age, or MALE 65+: How often do you have 4 or more drinks on one occassion? 0 Filed at: 12/11/2023 1111   Audit-C Score 0 Filed at: 12/11/2023 1111   ALEJANDRO: How many times in the past year have you. .. Used an illegal drug or used a prescription medication for non-medical reasons? Never Filed at: 12/11/2023 1111                      Medical Decision Making  Will test pulmonary and cardiac studies as well as possible infection including pneumonia or viral infection    Amount and/or Complexity of Data Reviewed  Labs: ordered. Radiology: ordered. Risk  Prescription drug management. Disposition  Final diagnoses:   Dyspnea   RSV (respiratory syncytial virus infection)   Pleural effusion     Time reflects when diagnosis was documented in both MDM as applicable and the Disposition within this note       Time User Action Codes Description Comment    12/11/2023  2:53 PM Mozella Stacks Add [R06.00] Dyspnea     12/11/2023  2:53 PM Schaller Schiller A Add [B33.8] RSV (respiratory syncytial virus infection)     12/11/2023  2:53 PM Schaller Schiller A Modify [R06.00] Dyspnea     12/11/2023  2:53 PM Schaller Schiller A Modify [B33.8] RSV (respiratory syncytial virus infection)     12/11/2023  2:54 PM Mozella Stacks Add [J90] Pleural effusion           ED Disposition       ED Disposition   Discharge    Condition   Stable    Date/Time   Mon Dec 11, 2023 [address] discharge to home/self care.                    Follow-up Information       Follow up With Specialties Details Why Contact Vianca Salcido DO Family Medicine Schedule an appointment as soon as possible for a visit   [address]  [address]  [phone]              Discharge Medication List as of 12/11/2023  2:54 PM        CONTINUE these medications which have NOT CHANGED    Details   allopurinol (ZYLOPRIM) 100 mg tablet Take 100 mg by mouth 2 (two) times a day, Historical Med      ALPRAZolam (XANAX) 0.5 mg tablet 0.5 mg daily at bedtime as needed for anxiety or sleep, Starting Mon 3/12/2018, Historical Med      ascorbic acid (VITAMIN C) 500 MG tablet Take 1 tablet (500 mg total) by mouth daily, Starting Fri 1/8/2021, No Print      atorvastatin (LIPITOR) 40 mg tablet Take 1 tablet (40 mg total) by mouth daily with dinner, Starting Wed 1/16/2019, Print      Blood Pressure Monitor NILTON by Does not apply route daily, Starting Wed 2/19/2020, Normal      cholecalciferol (VITAMIN D3) 1,000 units tablet Take 2 tablets (2,000 Units total) by mouth daily Do not start before July 12, 2023., Starting Wed 7/12/2023, Until Fri 10/27/2023, Normal      docusate sodium (COLACE) 100 mg capsule Take 1 capsule (100 mg total) by mouth 2 (two) times a day as needed for constipation, Starting Thu 8/10/2023, No Print      Eliquis 2.5 MG TAKE ONE TABLET BY MOUTH TWICE A DAY, Normal      ferrous sulfate 325 (65 Fe) mg tablet Take 325 mg by mouth daily with breakfast, Historical Med      !! glimepiride (AMARYL) 2 mg tablet Take 1 tablet (2 mg total) by mouth daily with dinner, Starting Mon 2/27/2023, Normal      !! glimepiride (AMARYL) 4 mg tablet Take 1 tablet (4 mg total) by mouth daily with breakfast, Starting Mon 2/27/2023, No Print      insulin glargine (LANTUS) 100 units/mL subcutaneous injection Inject 5 Units under the skin daily at bedtime, Starting Thu 8/10/2023, No Print      !! insulin lispro (HumaLOG) 100 units/mL injection Inject 1-5 Units under the skin 3 (three) times a day before meals, Starting Wed 11/22/2023, No Print      !! insulin lispro (HumaLOG) 100 units/mL injection Inject 1-5 Units under the skin daily at bedtime, Starting Wed 11/22/2023, No Print      latanoprost (XALATAN) 0.005 % ophthalmic solution 1 drop daily at bedtime, Historical Med      Semglrylie scottgn, 100 UNIT/ML SOPN Historical Med      sitaGLIPtin (JANUVIA) 25 mg tablet Take 1 tablet (25 mg total) by mouth daily Do not start before February 28, 2023., Starting Tue 2/28/2023, Normal      tamsulosin (FLOMAX) 0.4 mg Take 0.4 mg by mouth daily with dinner, Historical Med      timolol (TIMOPTIC) 0.5 % ophthalmic solution Administer 1 drop to both eyes daily, Starting Thu 1/7/2021, No Print      torsemide 40 MG TABS Take 80 mg by mouth daily Do not start before November 23, 2023., Starting Thu 11/23/2023, No Print      ZINC OXIDE PO Take by mouth daily, Historical Med       !! - Potential duplicate medications found. Please discuss with provider. No discharge procedures on file.     PDMP Review       None            ED Provider  Electronically Signed by             Yakelin Chacon MD  12/11/23 8833

## 2023-12-11 NOTE — ED NOTES
Received pt awake alert neb completed. Audible wheezing noted but pt reports feeling improved. Denies pain at present. Controlled afib noted on cardiac monitor rate 92. Bed low and locked, call bell in reach son at bedside, pending disposition. Catheter in place to right lateral chest wall, dressing in place CDI.            Yane Mandel RN  12/11/23 6441

## 2023-12-13 PROCEDURE — 88305 TISSUE EXAM BY PATHOLOGIST: CPT | Performed by: STUDENT IN AN ORGANIZED HEALTH CARE EDUCATION/TRAINING PROGRAM

## 2023-12-13 PROCEDURE — 88112 CYTOPATH CELL ENHANCE TECH: CPT | Performed by: STUDENT IN AN ORGANIZED HEALTH CARE EDUCATION/TRAINING PROGRAM

## 2023-12-14 LAB
BACTERIA SPEC BFLD CULT: NO GROWTH
GRAM STN SPEC: NORMAL

## 2023-12-19 ENCOUNTER — TELEPHONE (OUTPATIENT)
Dept: HEMATOLOGY ONCOLOGY | Facility: CLINIC | Age: 83
End: 2023-12-19

## 2024-01-03 ENCOUNTER — HOSPITAL ENCOUNTER (OUTPATIENT)
Dept: NON INVASIVE DIAGNOSTICS | Facility: HOSPITAL | Age: 84
Discharge: HOME | End: 2024-01-03
Attending: INTERNAL MEDICINE
Payer: MEDICARE

## 2024-01-03 DIAGNOSIS — J90 PLEURAL EFFUSION EXUDATIVE: ICD-10-CM

## 2024-01-03 PROCEDURE — 32552 REMOVE LUNG CATHETER: CPT | Performed by: INTERNAL MEDICINE

## 2024-01-03 PROCEDURE — 32552 REMOVE LUNG CATHETER: CPT

## 2024-01-03 RX ADMIN — Medication 10 ML: at 09:32

## 2024-01-03 NOTE — BRIEF OP NOTE (RAD/CATH)
INTERVENTIONAL RADIOLOGY PROCEDURE NOTE    Date: 1/3/2024    Procedure:   Procedure Summary       Date: 01/03/24 Room / Location: [facility]    Anesthesia Start:  Anesthesia Stop:     Procedure: IR PLEURAL EFFUSION LONG-TERM CATHETER REMOVAL Diagnosis:       Pleural effusion exudative      (Nonfunctional tunneled pleural drainage catheter)    Scheduled Providers:  Responsible Provider:     Anesthesia Type: Not recorded ASA Status: Not recorded            Preoperative diagnosis:   1. Pleural effusion exudative         Postoperative diagnosis: Same.    Surgeon: Kingston Deluca MD     Assistant: None. No qualified resident was available.    Blood loss: None    Specimens: None     Findings: Removal of right-sided tunneled pleural catheter.    Complications: None immediate.    Anesthesia: local

## 2024-01-03 NOTE — SEDATION DOCUMENTATION
Pt arrived for pleural cath removal. Catheter removed without difficulty, pt tolerated well. Steri-strip, telfa, tegaderm dressing placed. Pt educated and discharged to home with son.

## 2024-01-03 NOTE — DISCHARGE INSTRUCTIONS
Drainage Tube Removal    Your drainage tube was removed today.    What you need know at home:   Keep a clean dry dressing at the tube site until the small opening closes. It will take twenty four to forty eight hours. Keep the site dry until it heals. A small amount of drainage on your dressing is normal. Resume your normal diet. Small sips of flat soda will help with any nausea.     Contact Interventional Radiology for any of the following:    You have pain, fever greater than 101, shaking chills.  If you have increased redness or swelling at the site.   I the drainage from your site does not stop.  If the site drains pus or has a bad odor.     Contact Interventional Radiology at [phone]   ([facility] PATIENTS: Contact Interventional Radiology at [phone]) (XIME[place] PATIENTS: Contact Interventional Radiology at [phone]) if:

## 2024-01-23 ENCOUNTER — OFFICE VISIT (OUTPATIENT)
Dept: HEMATOLOGY ONCOLOGY | Facility: CLINIC | Age: 84
End: 2024-01-23
Payer: MEDICARE

## 2024-01-23 ENCOUNTER — TELEPHONE (OUTPATIENT)
Dept: HEMATOLOGY ONCOLOGY | Facility: CLINIC | Age: 84
End: 2024-01-23

## 2024-01-23 VITALS
TEMPERATURE: 97.5 F | HEART RATE: 89 BPM | HEIGHT: 69 IN | SYSTOLIC BLOOD PRESSURE: 132 MMHG | OXYGEN SATURATION: 100 % | BODY MASS INDEX: 22.81 KG/M2 | DIASTOLIC BLOOD PRESSURE: 78 MMHG | RESPIRATION RATE: 15 BRPM | WEIGHT: 154 LBS

## 2024-01-23 DIAGNOSIS — D75.89 MACROCYTOSIS: ICD-10-CM

## 2024-01-23 DIAGNOSIS — I48.20 CHRONIC ATRIAL FIBRILLATION (HCC): Primary | ICD-10-CM

## 2024-01-23 DIAGNOSIS — N18.4 CHRONIC KIDNEY DISEASE, STAGE 4 (SEVERE) (HCC): ICD-10-CM

## 2024-01-23 PROCEDURE — 99204 OFFICE O/P NEW MOD 45 MIN: CPT | Performed by: INTERNAL MEDICINE

## 2024-01-23 NOTE — TELEPHONE ENCOUNTER
Patient Running Late       Who are you speaking with? Elan Claudio   If it is not the patient, are they listed on an active communication consent form? Yes   When is the appointment scheduled?  Please list date and time 1/23/24 3:00pm   At which location is the appointment scheduled to take place? [place]   If patient is running less than 15 minutes late, have patient proceed to office. Appointment may need to be rescheduled at providers discretion. If provider cannot see patient today, the office will reschedule the visit.     Was this relayed to patient? Yes         González and Patient called to verify which building the office is in.  Patient is currently walking into the office.

## 2024-01-23 NOTE — PROGRESS NOTES
Yao Tipton  1940  [address] [facility] [place]  [address]  [place] [address]  HEMATOLOGY/ONCOLOGY CONSULTATION REPORT    DISCUSSION/SUMMARY:    83-year-old male with multiple medical problems recently admitted to the hospital and treated for cellulitis.  Presently Mr. Tipton states feeling okay, baseline.  Patient denies any symptoms from the anemia; activities are extremely limited anyway.  CBCs are trended below.  The most recent CBC from December 11, 2023 demonstrates a low normal white count, mildly to moderately decreased platelet count and a hemoglobin level of 10.6 g/deciliter.  MCV is within normal limits.  Differential demonstrates an elevated monocyte count.  We discussed options.    Patient/son understand that the CBC results are not normal but not terribly abnormal.  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  The plan is to continue to monitor only for now.  Mr. Tipton and his son prefer to follow-up with the PCP for now, CBCs can be checked periodically.  If there are significant abnormal progressive trends, patient can be referred back to hematology.  We also discussed the possibility of AVILA use in the future.  Son states this may be difficult getting his father back and forth for the injections.    Eliquis 2.5 mg twice a day (atrial fibrillation).  Patient/son are monitoring for excessive bruising or bleeding.    As above return to hematology is on an as-needed basis but Mr. Tipton and his son understand that they should contact the office if there are any other hematology questions or concerns.    Carefully review your medication list and verify that the list is accurate and up-to-date. Please call the hematology/oncology office if there are medications missing from the list, medications on the list  that you are not currently taking or if there is a dosage or instruction that is different from how you're taking that medication.    Patient goals and areas of care: Follow-up with PCP, monitor CBC parameters  Barriers to care: None  Patient is able to self-care  ______________________________________________________________________________________    Chief Complaint   Patient presents with    Consult    Anemia    Pancytopenia     History of Present Illness: 83-year-old male referred for evaluation of anemia.    Mr. Tipton presents with his son.  Patient was recently admitted to the hospital with lower extremity swelling, leg wound.  Patient was treated for cellulitis of the left lower extremity.  Patient also with diabetes, BPH, dyslipidemia, CHF, thrombocytopenia, chronic anemia.  Patient also with chronic atrial fibrillation on Eliquis 2.5 mg twice a day.  Prior CBCs have demonstrated pancytopenia.    Patient's son did most of the talking.  Presently Mr. Tipton states feeling okay, baseline.  Patient bruises easily but no problems with excessive bruising or bleeding.  No respiratory issues.  No recent fevers or signs of infection.  Activities are limited but baseline.  No pain control issues.  Appetite is okay, weight is stable.    Review of Systems   Constitutional:  Positive for fatigue.   HENT: Negative.     Eyes: Negative.    Respiratory: Negative.     Cardiovascular: Negative.    Gastrointestinal: Negative.    Endocrine: Negative.    Genitourinary: Negative.    Musculoskeletal: Negative.    Skin: Negative.    Allergic/Immunologic: Negative.    Neurological: Negative.    Hematological: Negative.         Easy bruising   Psychiatric/Behavioral: Negative.     All other systems reviewed and are negative.    Patient Active Problem List   Diagnosis    Closed traumatic displaced fracture of distal end of left radius with malunion    PVC (premature ventricular contraction)    Essential hypertension    Leg edema     Type 2 diabetes mellitus (HCC)    Recent pericardial effusion    Chronic combined systolic and diastolic congestive heart failure (HCC)    Chronic kidney disease, stage 4 (severe) (HCC)    Vitamin D deficiency    Cellulitis of left upper extremity    Chronic atrial fibrillation (HCC)    COVID-19    Electrolyte abnormality    Benign hypertension with CKD (chronic kidney disease) stage IV (HCC)    Persistent proteinuria    Chronic anemia    Chronic kidney disease-mineral and bone disorder    Advanced care planning/counseling discussion    Hyponatremia    Macrocytosis    Urinary retention    Septic shock (HCC)    Thrombocytopenia (HCC)    Diarrhea    Hypothermia    Hypoglycemia    Recent empyema of lung with persistent locuted R hydropneumothorax    Encephalopathy    Duodenal ulcer    Goals of care, counseling/discussion    Pleural effusion exudative    Constipation    Cellulitis of left lower extremity    Chronic combined systolic and diastolic CHF (congestive heart failure) (HCC)    Dyslipidemia    BPH (benign prostatic hyperplasia)    Left ankle pain     Past Medical History:   Diagnosis Date    Atrial fibrillation (HCC)     Chronic kidney disease     Coronary artery disease     Diabetes mellitus (HCC)     Hyperlipidemia     Hypertension      Past Surgical History:   Procedure Laterality Date    CARDIAC CATHETERIZATION N/A 6/30/2023    Procedure: Cardiac pericardiocentesis;  Surgeon: Shanna Adame DO;  Location: [facility];  Service: Cardiology    CARDIAC CATHETERIZATION N/A 6/30/2023    Procedure: Cardiac RHC;  Surgeon: Shanna Adame DO;  Location: [facility];  Service: Cardiology    EYE SURGERY      IR CHEST TUBE PLACEMENT  6/24/2023    IR CHEST TUBE PLACEMENT  7/28/2023    IR PLEURAL EFFUSION LONG-TERM CATHETER PLACEMENT  8/7/2023    IR PLEURAL EFFUSION LONG-TERM CATHETER REMOVAL  1/3/2024    IR THORACENTESIS  12/11/2023    SKIN CANCER EXCISION      TONSILLECTOMY       Family History    Problem Relation Age of Onset    Heart disease Mother     Diabetes Father     Vision loss Father     Cancer Sister     Diabetes Sister      Social History     Socioeconomic History    Marital status:      Spouse name: Not on file    Number of children: 1    Years of education: 12    Highest education level: 12th grade   Occupational History    Not on file   Tobacco Use    Smoking status: Never    Smokeless tobacco: Former    Tobacco comments:     Smoked a pipe 50 years ago   Vaping Use    Vaping status: Never Used   Substance and Sexual Activity    Alcohol use: Not Currently     Alcohol/week: 0.0 standard drinks of alcohol     Comment: special occasions    Drug use: Not Currently    Sexual activity: Not on file   Other Topics Concern    Not on file   Social History Narrative    Not on file     Social Determinants of Health     Financial Resource Strain: Not on file   Food Insecurity: No Food Insecurity (11/21/2023)    Hunger Vital Sign     Worried About Running Out of Food in the Last Year: Never true     Ran Out of Food in the Last Year: Never true   Transportation Needs: No Transportation Needs (11/21/2023)    PRAPARE - Transportation     Lack of Transportation (Medical): No     Lack of Transportation (Non-Medical): No   Physical Activity: Not on file   Stress: Not on file   Social Connections: Not on file   Intimate Partner Violence: Not on file   Housing Stability: Low Risk  (11/21/2023)    Housing Stability Vital Sign     Unable to Pay for Housing in the Last Year: No     Number of Places Lived in the Last Year: 1     Unstable Housing in the Last Year: No       Current Outpatient Medications:     allopurinol (ZYLOPRIM) 100 mg tablet, Take 100 mg by mouth 2 (two) times a day, Disp: , Rfl:     ALPRAZolam (XANAX) 0.5 mg tablet, 0.5 mg daily at bedtime as needed for anxiety or sleep, Disp: , Rfl: 0    ascorbic acid (VITAMIN C) 500 MG tablet, Take 1 tablet (500 mg total) by mouth daily, Disp: , Rfl: 0     atorvastatin (LIPITOR) 40 mg tablet, Take 1 tablet (40 mg total) by mouth daily with dinner, Disp: 30 tablet, Rfl: 0    Blood Pressure Monitor NILTON, by Does not apply route daily, Disp: 1 Device, Rfl: 0    Eliquis 2.5 MG, TAKE ONE TABLET BY MOUTH TWICE A DAY, Disp: 60 tablet, Rfl: 11    ferrous sulfate 325 (65 Fe) mg tablet, Take 325 mg by mouth daily with breakfast, Disp: , Rfl:     glimepiride (AMARYL) 2 mg tablet, Take 1 tablet (2 mg total) by mouth daily with dinner, Disp: 30 tablet, Rfl: 0    glimepiride (AMARYL) 4 mg tablet, Take 1 tablet (4 mg total) by mouth daily with breakfast, Disp: , Rfl: 0    halobetasol (ULTRAVATE) 0.05 % cream, , Disp: , Rfl:     insulin glargine (LANTUS) 100 units/mL subcutaneous injection, Inject 5 Units under the skin daily at bedtime, Disp: 10 mL, Rfl: 0    insulin lispro (HumaLOG) 100 units/mL injection, Inject 1-5 Units under the skin 3 (three) times a day before meals, Disp: , Rfl: 0    insulin lispro (HumaLOG) 100 units/mL injection, Inject 1-5 Units under the skin daily at bedtime, Disp: , Rfl: 0    latanoprost (XALATAN) 0.005 % ophthalmic solution, 1 drop daily at bedtime, Disp: , Rfl:     Semglee, yfgn, 100 UNIT/ML SOPN, , Disp: , Rfl:     tamsulosin (FLOMAX) 0.4 mg, Take 0.4 mg by mouth daily with dinner, Disp: , Rfl:     timolol (TIMOPTIC) 0.5 % ophthalmic solution, Administer 1 drop to both eyes daily, Disp: , Rfl:     torsemide 40 MG TABS, Take 80 mg by mouth daily Do not start before November 23, 2023., Disp: , Rfl: 0    ZINC OXIDE PO, Take by mouth daily, Disp: , Rfl:     cholecalciferol (VITAMIN D3) 1,000 units tablet, Take 2 tablets (2,000 Units total) by mouth daily Do not start before July 12, 2023., Disp: 60 tablet, Rfl: 0    docusate sodium (COLACE) 100 mg capsule, Take 1 capsule (100 mg total) by mouth 2 (two) times a day as needed for constipation (Patient not taking: Reported on 10/25/2023), Disp: , Rfl: 0    sitaGLIPtin (JANUVIA) 25 mg tablet, Take 1 tablet  (25 mg total) by mouth daily Do not start before February 28, 2023. (Patient not taking: Reported on 11/29/2023), Disp: 30 tablet, Rfl: 0    Allergies   Allergen Reactions    Elemental Sulfur     Sulfa Antibiotics        Vitals:    01/23/24 1519   BP: 132/78   Pulse: 89   Resp: 15   Temp: 97.5 °F (36.4 °C)   SpO2: 100%     Physical Exam  Constitutional:       Appearance: He is well-developed.   HENT:      Head: Normocephalic and atraumatic.      Right Ear: External ear normal.      Left Ear: External ear normal.   Eyes:      Conjunctiva/sclera: Conjunctivae normal.      Pupils: Pupils are equal, round, and reactive to light.   Cardiovascular:      Rate and Rhythm: Normal rate and regular rhythm.      Heart sounds: Normal heart sounds.   Pulmonary:      Effort: Pulmonary effort is normal.      Breath sounds: Normal breath sounds.   Abdominal:      General: Bowel sounds are normal.      Palpations: Abdomen is soft.   Musculoskeletal:         General: Normal range of motion.      Cervical back: Normal range of motion and neck supple.   Skin:     General: Skin is warm.      Comments: Warm,, relatively good color, scattered purpura in various stages of resolution, few scabs, no active bleeding   Neurological:      Mental Status: He is alert and oriented to person, place, and time.      Deep Tendon Reflexes: Reflexes are normal and symmetric.   Psychiatric:         Behavior: Behavior normal.         Thought Content: Thought content normal.         Judgment: Judgment normal.     Extremities: 1+ bilateral lower extreme edema, no cords, pulses are decreased  Lymphatics: No adenopathy in the neck, supraclavicular region, axilla bilaterally    Labs        12/11/2023 BUN = 49 creatinine = 2.23 glucose = 268 calcium = 8.0 AST = 18 ALT = 9 alkaline phosphatase = 119 total protein = 5.9 albumin = 3.3 total bilirubin = 0.60    Imaging    12/11/2023 chest x-ray one-view portable.  Impression stated unchanged moderate right and small  left pleural effusions without pneumothorax.  Unchanged bibasilar atelectasis, otherwise clear lungs.

## 2024-03-06 ENCOUNTER — OFFICE VISIT (OUTPATIENT)
Dept: AUDIOLOGY | Facility: CLINIC | Age: 84
End: 2024-03-06

## 2024-03-06 DIAGNOSIS — H90.3 SENSORINEURAL HEARING LOSS (SNHL), BILATERAL: Primary | ICD-10-CM

## 2024-03-06 NOTE — PROGRESS NOTES
[facility] AUDIOLOGY HEARING AID CHECK    Yao Tipton was seen today (3/6/2024) for a hearing aid check of his bilateral hearing aids. He was accompanied today by his son González. Today, González stated that he was noticing that his father has not been hearing well over the last couple of days.     Otoscopy revealed Unremarkable, canal clear.     Device Information    Hearing Aid Fitting Date: 3/6/2024       Left Device Right Device   Hearing Aid Make: Oticon Oticon   Hearing Aid Model: Real 2 miniRITE R Real 2 miniRITE R   Serial Number: [id number]   Repair Warranty Expiration Date: 10/11/26 10/11/26   Loss/Damage Warranty Expiration Date:  10/11/26 10/11/26    Length: 2 2    Output: 85 85   Wax System: Pro Wax miniFIT Pro Wax miniFIT   Dome Size/Style: 8mm double bass 8mm double bass   Battery: Lithium-ion Rechargeable Lithium-ion Rechargeable   [employer]old Serial Number: N/A N/A   [employer]old Warranty Date:  N/A N/A      Serial Number:  [id number]  Accessories: N/A      Visual inspection of the device(s) revealed no noticeable damage or defects    A listening check revealed good sound quality from the device(s)    Changes to Device(s)    Datalogging revealed 12 hr/day of usage.   Form factor cleaned, microphone port vacuumed.  Domes and wax guard replaced.     Recommendations & Follow-up    Hearing aids(s) are in good working condition. Mr. Tipton and his son were questioned at length about Mr. Tipton's deterioration of hearing ability. There is concern for possible cognitive decline. Of note, in dialogue today Mr. Tipton was slow to respond and tangential in speech at times. He also appeared to be falling asleep at several points during today's exam. He is back to living independently at home. He also presented unkempt in appearance and dress today. He has a visit with his PCP pending to discuss these factors further. If a hearing change is suspected and the rest of his health profile is stable,  he is welcome to [facility] for repeat audiogram to ensure audiometric stability. Otherwise RTC in approx 2-3 months for routine servicing.     It was a pleasure working with Mr. Tipton today. He was encouraged to contact the clinic with any further questions in the meantime.    Rodney Peralta.  Clinical Audiologist    [facility] AUDIOLOGY  [address] [address]

## 2024-03-14 ENCOUNTER — TELEPHONE (OUTPATIENT)
Dept: NEPHROLOGY | Facility: CLINIC | Age: 84
End: 2024-03-14

## 2024-04-25 ENCOUNTER — TELEPHONE (OUTPATIENT)
Age: 84
End: 2024-04-25

## 2024-04-25 NOTE — TELEPHONE ENCOUNTER
Pt is seeing his pcp to day and is asking for his lab slip to be faxed there so they can draw his blood all at once.  Dr melendez  fax [phone]  .  Please let the son know when faxed.

## 2024-04-29 ENCOUNTER — OFFICE VISIT (OUTPATIENT)
Dept: NEPHROLOGY | Facility: CLINIC | Age: 84
End: 2024-04-29
Payer: MEDICARE

## 2024-04-29 VITALS
SYSTOLIC BLOOD PRESSURE: 124 MMHG | WEIGHT: 154 LBS | BODY MASS INDEX: 22.81 KG/M2 | HEIGHT: 69 IN | DIASTOLIC BLOOD PRESSURE: 70 MMHG | HEART RATE: 46 BPM | OXYGEN SATURATION: 96 %

## 2024-04-29 DIAGNOSIS — M89.9 CHRONIC KIDNEY DISEASE-MINERAL AND BONE DISORDER: ICD-10-CM

## 2024-04-29 DIAGNOSIS — I50.42 CHRONIC COMBINED SYSTOLIC AND DIASTOLIC CONGESTIVE HEART FAILURE (HCC): Primary | Chronic | ICD-10-CM

## 2024-04-29 DIAGNOSIS — I12.9 BENIGN HYPERTENSION WITH CKD (CHRONIC KIDNEY DISEASE) STAGE IV (HCC): ICD-10-CM

## 2024-04-29 DIAGNOSIS — N18.4 BENIGN HYPERTENSION WITH CKD (CHRONIC KIDNEY DISEASE) STAGE IV (HCC): ICD-10-CM

## 2024-04-29 DIAGNOSIS — E83.9 CHRONIC KIDNEY DISEASE-MINERAL AND BONE DISORDER: ICD-10-CM

## 2024-04-29 DIAGNOSIS — I12.9 TYPE 2 DIABETES MELLITUS WITH STAGE 4 CHRONIC KIDNEY DISEASE AND HYPERTENSION (HCC): ICD-10-CM

## 2024-04-29 DIAGNOSIS — N18.4 TYPE 2 DIABETES MELLITUS WITH STAGE 4 CHRONIC KIDNEY DISEASE AND HYPERTENSION (HCC): ICD-10-CM

## 2024-04-29 DIAGNOSIS — E11.22 TYPE 2 DIABETES MELLITUS WITH STAGE 4 CHRONIC KIDNEY DISEASE AND HYPERTENSION (HCC): ICD-10-CM

## 2024-04-29 DIAGNOSIS — N18.9 CHRONIC KIDNEY DISEASE-MINERAL AND BONE DISORDER: ICD-10-CM

## 2024-04-29 PROCEDURE — 99214 OFFICE O/P EST MOD 30 MIN: CPT | Performed by: INTERNAL MEDICINE

## 2024-04-29 NOTE — PROGRESS NOTES
NEPHROLOGY OFFICE VISIT   Yao Tipton 83 y.o. male MRN: [id number]  4/29/2024    Reason for Visit: Chronic kidney disease stage IV    History of Present Illness (HPI):    I had the pleasure of seeing Yao accompanied by his son today in the renal clinic for the continued management of chronic kidney disease stage IV. Since our last visit, was in the emergency room on December 11 for shortness of breath but was not admitted.  His pleural X catheter has been removed.    He currently has no new complaints.    An at length discussion about the GFR and the stability of his renal function.    We reviewed his blood work from last week which included a CMP CBC and hemoglobin A1c.    His diabetic management is improved his hemoglobin A1c has improved to 7.    Blood pressure is under excellent control.    He currently exhibits no uremic symptoms.    Renal function is currently stable and at baseline with a creatinine of 2.3 mg/dL.    ASSESSMENT and PLAN:    1.) Chronic kidney disease stage IV  -presumed etiology is most likely diabetic nephropathy, with possible component of hypertensive nephrosclerosis and arterial sclerosis.  May even have a component of renal vascular disease  -baseline creatinine 2.5-3 mg/dL, renal function currently stable creatinine at 2.3 mg/dL  -Dialysis modalities in the past have been discussed at length  -Chronic Kidney Disease education/Kidney Smart:  Attended  -no urgent indication for dialysis at this time  -avoid NSAIDs  -No recent renal imaging and no clinical indication to repeat at this time  -diabetic and blood pressure control  -No urgent indication for renal placement therapy.  No firm decision on renal placement therapy at this time.  -Continue torsemide 80 mg daily  -Follow-up in 6 months  -Sinew current management     2.) Chronic systolic congestive heart failure  -ejection fraction 40%  -follows with Cardiology- Dr. Ramos  -premature ventricular contraction, pericardial effusion,  was moderate on the last echocardiogram  --155 lbs, weight currently stable  -Continue torsemide 80 mg daily  -daily weights  -low 2 g sodium diet     3.) Hypertension  -Blood pressure at target  -aim for less than 130/80  -Torsemide 80 mg daily, no longer on carvedilol due to bradycardia  -low 2 g sodium diet  -Continue current management     4.)  Diabetes mellitus type 2  -hemoglobin A1c: 7 (A1C values may be falsely elevated or decreased in those with CKD, assay method should be certified by National glycohemoglobin standardization Program). Target A1C < 7  -current medications: Glimepiride and sitagliptin  -proteinuria: Screen for microalbuminuria at the next visit  -retinopathy:  No  -neuropathy:  No  -please follow with an ophthalmologist and podiatrist every year  -continued self monitoring of blood glucose at home  -Treatment Management in CKD Recommendations:   Metformin:  Avoid if creatinine clearance is less than 30 cc/min (concern for lactic acidosis)  Sodium-Glucose Cotransporter-2 (SGLT2) Inhibitors:  May see an acute drop in the eGFR initially when starting the medication but then a stabilization of the renal function, with slower loss of renal function as compared to placebo.  Relative risk of end-stage renal disease, doubling of serum creatinine or death from renal causes were also found to be lower as compared to placebo. ([facility]).  Would recommend starting at 100 mg daily, I would avoid use in patients with eGFR < 30 cc/min.  If no contraindications exist I would recommend this medication added to the diabetic regiment  Sulfonylureas:  Preferred short-acting (glipizide, glimepiride, repaglinide), relatively safe in patients with non dialysis CKD  Thiazolidinedones/Alpha-Gluosidase inhibitors/Dipeptidyl peptidase-4 inhibitors:  Generally not considered first-line agents in CKD (limited data in long-term safety and efficacy)  Insulin:  Starting dose may need to be lower than what would  ordinarily be used, as there is a decrease metabolism of insulin (no dose adjustment if the GFR > 50 mL/min, dose should be reduced to 75% of baseline if GFR 10-50 mL/min, and 50% baseline if GFR < 10 mL/min)     5.) Urinary retention  -- No urinary symptoms at this time and is voiding well.     6.) Hyponatremia--resolved  -- Continue fluid restriction         PATIENT INSTRUCTIONS:    Patient Instructions   1.)  Low 2 g sodium diet    2.)  Monitor weights at home    3.)  Avoid NSAIDs (ibuprofen, Motrin, Advil, Aleve, naproxen)    4.)  Monitor blood pressure at home, call if blood pressure greater than 150/90 persistently    5.) I will plan to discuss all results including blood work, and/or imaging at our next visit, unless there is an urgent indication, in which case I will call you earlier. If you have any questions or concerns about your results, please feel free to call our office.            Orders Placed This Encounter   Procedures    PTH, intact     Standing Status:   Future     Number of Occurrences:   1     Standing Expiration Date:   4/29/2025    Phosphorus     Standing Status:   Future     Number of Occurrences:   1     Standing Expiration Date:   4/29/2025    CBC and Platelet     Standing Status:   Future     Number of Occurrences:   1     Standing Expiration Date:   4/29/2025    Albumin / creatinine urine ratio     Standing Status:   Future     Number of Occurrences:   1     Standing Expiration Date:   4/29/2025    Comprehensive metabolic panel     This is a patient instruction: Patient fasting for 8 hours or longer recommended.     Standing Status:   Future     Number of Occurrences:   1     Standing Expiration Date:   4/29/2025    Hemoglobin A1C     Standing Status:   Future     Number of Occurrences:   1     Standing Expiration Date:   4/29/2025       OBJECTIVE:  Current Weight: Weight - Scale: 69.9 kg (154 lb)  Vitals:    04/29/24 1551   BP: 124/70   BP Location: Left arm   Patient Position: Sitting  "  Cuff Size: Standard   Pulse: (!) 46   SpO2: 96%   Weight: 69.9 kg (154 lb)   Height: 5' 9\" (1.753 m)    Body mass index is 22.74 kg/m².      REVIEW OF SYSTEMS:    Review of Systems   Constitutional:  Negative for activity change and fever.   Respiratory:  Negative for cough, chest tightness, shortness of breath and wheezing.    Cardiovascular:  Negative for chest pain and leg swelling.   Gastrointestinal:  Negative for abdominal pain, diarrhea, nausea and vomiting.   Endocrine: Negative for polyuria.   Genitourinary:  Negative for difficulty urinating, dysuria, flank pain, frequency and urgency.   Skin:  Negative for rash.   Neurological:  Negative for dizziness, syncope, light-headedness and headaches.   All other systems reviewed and are negative.      PHYSICAL EXAM:      Physical Exam  Vitals and nursing note reviewed. Exam conducted with a chaperone present.   Constitutional:       General: He is not in acute distress.     Appearance: He is well-developed.   HENT:      Head: Normocephalic and atraumatic.   Eyes:      General: No scleral icterus.     Conjunctiva/sclera: Conjunctivae normal.      Pupils: Pupils are equal, round, and reactive to light.   Cardiovascular:      Rate and Rhythm: Normal rate and regular rhythm.      Heart sounds: S1 normal and S2 normal. No murmur heard.     No friction rub. No gallop.   Pulmonary:      Effort: Pulmonary effort is normal. No respiratory distress.      Breath sounds: Normal breath sounds. No wheezing or rales.   Abdominal:      General: Bowel sounds are normal.      Palpations: Abdomen is soft.      Tenderness: There is no abdominal tenderness. There is no rebound.   Musculoskeletal:         General: Normal range of motion.      Cervical back: Normal range of motion and neck supple.   Skin:     Findings: No rash.   Neurological:      Mental Status: He is alert and oriented to person, place, and time.   Psychiatric:         Behavior: Behavior normal. "         Medications:    Current Outpatient Medications:     allopurinol (ZYLOPRIM) 100 mg tablet, Take 100 mg by mouth 2 (two) times a day, Disp: , Rfl:     ascorbic acid (VITAMIN C) 500 MG tablet, Take 1 tablet (500 mg total) by mouth daily, Disp: , Rfl: 0    atorvastatin (LIPITOR) 40 mg tablet, Take 1 tablet (40 mg total) by mouth daily with dinner, Disp: 30 tablet, Rfl: 0    cholecalciferol (VITAMIN D3) 1,000 units tablet, Take 2 tablets (2,000 Units total) by mouth daily Do not start before July 12, 2023., Disp: 60 tablet, Rfl: 0    Eliquis 2.5 MG, TAKE ONE TABLET BY MOUTH TWICE A DAY, Disp: 60 tablet, Rfl: 11    ferrous sulfate 325 (65 Fe) mg tablet, Take 325 mg by mouth daily with breakfast, Disp: , Rfl:     glimepiride (AMARYL) 4 mg tablet, Take 1 tablet (4 mg total) by mouth daily with breakfast (Patient taking differently: Take 4 mg by mouth 2 (two) times a day), Disp: , Rfl: 0    insulin glargine (LANTUS) 100 units/mL subcutaneous injection, Inject 5 Units under the skin daily at bedtime, Disp: 10 mL, Rfl: 0    insulin lispro (HumaLOG) 100 units/mL injection, Inject 1-5 Units under the skin 3 (three) times a day before meals, Disp: , Rfl: 0    latanoprost (XALATAN) 0.005 % ophthalmic solution, 1 drop daily at bedtime, Disp: , Rfl:     tamsulosin (FLOMAX) 0.4 mg, Take 0.4 mg by mouth daily with dinner, Disp: , Rfl:     timolol (TIMOPTIC) 0.5 % ophthalmic solution, Administer 1 drop to both eyes daily, Disp: , Rfl:     torsemide 40 MG TABS, Take 80 mg by mouth daily Do not start before November 23, 2023. (Patient taking differently: Take 40 mg by mouth daily Alternating 40 m g. And 60 mg. Every other day), Disp: , Rfl: 0    ZINC OXIDE PO, Take by mouth daily, Disp: , Rfl:     ALPRAZolam (XANAX) 0.5 mg tablet, 0.5 mg daily at bedtime as needed for anxiety or sleep, Disp: , Rfl: 0    Blood Pressure Monitor NILTON, by Does not apply route daily, Disp: 1 Device, Rfl: 0    docusate sodium (COLACE) 100 mg capsule,  "Take 1 capsule (100 mg total) by mouth 2 (two) times a day as needed for constipation (Patient not taking: Reported on 10/25/2023), Disp: , Rfl: 0    glimepiride (AMARYL) 2 mg tablet, Take 1 tablet (2 mg total) by mouth daily with dinner (Patient not taking: Reported on 4/29/2024), Disp: 30 tablet, Rfl: 0    halobetasol (ULTRAVATE) 0.05 % cream, , Disp: , Rfl:     insulin lispro (HumaLOG) 100 units/mL injection, Inject 1-5 Units under the skin daily at bedtime, Disp: , Rfl: 0    Semglee, yfgn, 100 UNIT/ML SOPN, , Disp: , Rfl:     sitaGLIPtin (JANUVIA) 25 mg tablet, Take 1 tablet (25 mg total) by mouth daily Do not start before February 28, 2023. (Patient not taking: Reported on 11/29/2023), Disp: 30 tablet, Rfl: 0    Laboratory Results:        Invalid input(s): \"ALBUMIN\"    Results for orders placed or performed during the hospital encounter of 12/11/23   FLU/RSV/COVID - if FLU/RSV clinically relevant    Specimen: Nose; Nares   Result Value Ref Range    SARS-CoV-2 Negative Negative    INFLUENZA A PCR Negative Negative    INFLUENZA B PCR Negative Negative    RSV PCR Positive (A) Negative   CBC and differential   Result Value Ref Range    WBC 4.02 (L) 4.31 - 10.16 Thousand/uL    RBC 3.47 (L) 3.88 - 5.62 Million/uL    Hemoglobin 10.6 (L) 12.0 - 17.0 g/dL    Hematocrit 32.6 (L) 36.5 - 49.3 %    MCV 94 82 - 98 fL    MCH 30.5 26.8 - 34.3 pg    MCHC 32.5 31.4 - 37.4 g/dL    RDW 20.3 (H) 11.6 - 15.1 %    MPV 8.4 (L) 8.9 - 12.7 fL    Platelets 101 (L) 149 - 390 Thousands/uL    nRBC 0 /100 WBCs    Segmented % 77 (H) 43 - 75 %    Immature Grans % 0 0 - 2 %    Lymphocytes % 10 (L) 14 - 44 %    Monocytes % 13 (H) 4 - 12 %    Eosinophils Relative 0 0 - 6 %    Basophils Relative 0 0 - 1 %    Absolute Neutrophils 3.06 1.85 - 7.62 Thousands/µL    Absolute Immature Grans 0.01 0.00 - 0.20 Thousand/uL    Absolute Lymphocytes 0.39 (L) 0.60 - 4.47 Thousands/µL    Absolute Monocytes 0.54 0.17 - 1.22 Thousand/µL    Eosinophils Absolute " "0.01 0.00 - 0.61 Thousand/µL    Basophils Absolute 0.01 0.00 - 0.10 Thousands/µL   Protime-INR   Result Value Ref Range    Protime 18.8 (H) 11.6 - 14.5 seconds    INR 1.55 (H) 0.84 - 1.19   APTT   Result Value Ref Range    PTT 42 (H) 23 - 37 seconds   Comprehensive metabolic panel   Result Value Ref Range    Sodium 130 (L) 135 - 147 mmol/L    Potassium 4.6 3.5 - 5.3 mmol/L    Chloride 92 (L) 96 - 108 mmol/L    CO2 30 21 - 32 mmol/L    ANION GAP 8 mmol/L    BUN 49 (H) 5 - 25 mg/dL    Creatinine 2.23 (H) 0.60 - 1.30 mg/dL    Glucose 268 (H) 65 - 140 mg/dL    Calcium 8.0 (L) 8.4 - 10.2 mg/dL    Corrected Calcium 8.6 8.3 - 10.1 mg/dL    AST 18 13 - 39 U/L    ALT 9 7 - 52 U/L    Alkaline Phosphatase 119 (H) 34 - 104 U/L    Total Protein 5.9 (L) 6.4 - 8.4 g/dL    Albumin 3.3 (L) 3.5 - 5.0 g/dL    Total Bilirubin 0.60 0.20 - 1.00 mg/dL    eGFR 25 ml/min/1.73sq m   HS Troponin 0hr (reflex protocol)   Result Value Ref Range    hs TnI 0hr 30 \"Refer to ACS Flowchart\"- see link ng/L   B-Type Natriuretic Peptide(BNP)   Result Value Ref Range     (H) 0 - 100 pg/mL   Procalcitonin   Result Value Ref Range    Procalcitonin 0.28 (H) <=0.25 ng/ml   ECG 12 lead   Result Value Ref Range    Ventricular Rate 120 BPM    Atrial Rate  BPM    LA Interval  ms    QRSD Interval 78 ms    QT Interval 294 ms    QTC Interval 415 ms    P Axis  degrees    QRS Axis 21 degrees    T Wave Axis 82 degrees         "

## 2024-06-04 ENCOUNTER — HOSPITAL ENCOUNTER (INPATIENT)
Facility: HOSPITAL | Age: 84
LOS: 2 days | Discharge: HOME HEALTH | DRG: 291 | End: 2024-06-06
Attending: EMERGENCY MEDICINE | Admitting: STUDENT IN AN ORGANIZED HEALTH CARE EDUCATION/TRAINING PROGRAM
Payer: MEDICARE

## 2024-06-04 ENCOUNTER — APPOINTMENT (EMERGENCY)
Dept: RADIOLOGY | Facility: HOSPITAL | Age: 84
DRG: 291 | End: 2024-06-04
Payer: MEDICARE

## 2024-06-04 DIAGNOSIS — R26.2 DIFFICULTY WALKING: ICD-10-CM

## 2024-06-04 DIAGNOSIS — J90 PLEURAL EFFUSION: ICD-10-CM

## 2024-06-04 DIAGNOSIS — Z78.9 UNABLE TO CARE FOR SELF: ICD-10-CM

## 2024-06-04 DIAGNOSIS — R53.1 GENERALIZED WEAKNESS: Primary | ICD-10-CM

## 2024-06-04 PROBLEM — M10.9 GOUT: Status: ACTIVE | Noted: 2024-06-04

## 2024-06-04 PROBLEM — R45.851 SUICIDAL IDEATION: Status: ACTIVE | Noted: 2024-06-04

## 2024-06-04 LAB
2HR DELTA HS TROPONIN: -1 NG/L
4HR DELTA HS TROPONIN: -2 NG/L
ALBUMIN SERPL BCP-MCNC: 3.2 G/DL (ref 3.5–5)
ALP SERPL-CCNC: 139 U/L (ref 34–104)
ALT SERPL W P-5'-P-CCNC: 24 U/L (ref 7–52)
ANION GAP SERPL CALCULATED.3IONS-SCNC: 4 MMOL/L (ref 4–13)
APTT PPP: 45 SECONDS (ref 23–37)
AST SERPL W P-5'-P-CCNC: 19 U/L (ref 13–39)
BASOPHILS # BLD AUTO: 0.02 THOUSANDS/ÂΜL (ref 0–0.1)
BASOPHILS NFR BLD AUTO: 0 % (ref 0–1)
BILIRUB SERPL-MCNC: 0.74 MG/DL (ref 0.2–1)
BNP SERPL-MCNC: 287 PG/ML (ref 0–100)
BUN SERPL-MCNC: 50 MG/DL (ref 5–25)
CALCIUM ALBUM COR SERPL-MCNC: 8.7 MG/DL (ref 8.3–10.1)
CALCIUM SERPL-MCNC: 8.1 MG/DL (ref 8.4–10.2)
CARDIAC TROPONIN I PNL SERPL HS: 15 NG/L
CARDIAC TROPONIN I PNL SERPL HS: 16 NG/L
CARDIAC TROPONIN I PNL SERPL HS: 17 NG/L
CHLORIDE SERPL-SCNC: 95 MMOL/L (ref 96–108)
CO2 SERPL-SCNC: 35 MMOL/L (ref 21–32)
CREAT SERPL-MCNC: 1.9 MG/DL (ref 0.6–1.3)
EOSINOPHIL # BLD AUTO: 0.09 THOUSAND/ÂΜL (ref 0–0.61)
EOSINOPHIL NFR BLD AUTO: 2 % (ref 0–6)
ERYTHROCYTE [DISTWIDTH] IN BLOOD BY AUTOMATED COUNT: 15.3 % (ref 11.6–15.1)
GFR SERPL CREATININE-BSD FRML MDRD: 31 ML/MIN/1.73SQ M
GLUCOSE SERPL-MCNC: 183 MG/DL (ref 65–140)
GLUCOSE SERPL-MCNC: 257 MG/DL (ref 65–140)
GLUCOSE SERPL-MCNC: 333 MG/DL (ref 65–140)
HCT VFR BLD AUTO: 33.6 % (ref 36.5–49.3)
HGB BLD-MCNC: 10.8 G/DL (ref 12–17)
IMM GRANULOCYTES # BLD AUTO: 0.01 THOUSAND/UL (ref 0–0.2)
IMM GRANULOCYTES NFR BLD AUTO: 0 % (ref 0–2)
INR PPP: 1.53 (ref 0.84–1.19)
LYMPHOCYTES # BLD AUTO: 1.01 THOUSANDS/ÂΜL (ref 0.6–4.47)
LYMPHOCYTES NFR BLD AUTO: 19 % (ref 14–44)
MCH RBC QN AUTO: 32.1 PG (ref 26.8–34.3)
MCHC RBC AUTO-ENTMCNC: 32.1 G/DL (ref 31.4–37.4)
MCV RBC AUTO: 100 FL (ref 82–98)
MONOCYTES # BLD AUTO: 0.64 THOUSAND/ÂΜL (ref 0.17–1.22)
MONOCYTES NFR BLD AUTO: 12 % (ref 4–12)
NEUTROPHILS # BLD AUTO: 3.5 THOUSANDS/ÂΜL (ref 1.85–7.62)
NEUTS SEG NFR BLD AUTO: 67 % (ref 43–75)
NRBC BLD AUTO-RTO: 0 /100 WBCS
PLATELET # BLD AUTO: 130 THOUSANDS/UL (ref 149–390)
PMV BLD AUTO: 10 FL (ref 8.9–12.7)
POTASSIUM SERPL-SCNC: 4.4 MMOL/L (ref 3.5–5.3)
PROT SERPL-MCNC: 5.7 G/DL (ref 6.4–8.4)
PROTHROMBIN TIME: 18.6 SECONDS (ref 11.6–14.5)
RBC # BLD AUTO: 3.36 MILLION/UL (ref 3.88–5.62)
SODIUM SERPL-SCNC: 134 MMOL/L (ref 135–147)
WBC # BLD AUTO: 5.27 THOUSAND/UL (ref 4.31–10.16)

## 2024-06-04 PROCEDURE — 93005 ELECTROCARDIOGRAM TRACING: CPT

## 2024-06-04 PROCEDURE — 80053 COMPREHEN METABOLIC PANEL: CPT | Performed by: EMERGENCY MEDICINE

## 2024-06-04 PROCEDURE — 82948 REAGENT STRIP/BLOOD GLUCOSE: CPT

## 2024-06-04 PROCEDURE — 99223 1ST HOSP IP/OBS HIGH 75: CPT | Performed by: STUDENT IN AN ORGANIZED HEALTH CARE EDUCATION/TRAINING PROGRAM

## 2024-06-04 PROCEDURE — 84484 ASSAY OF TROPONIN QUANT: CPT | Performed by: EMERGENCY MEDICINE

## 2024-06-04 PROCEDURE — 85610 PROTHROMBIN TIME: CPT | Performed by: EMERGENCY MEDICINE

## 2024-06-04 PROCEDURE — 36415 COLL VENOUS BLD VENIPUNCTURE: CPT | Performed by: EMERGENCY MEDICINE

## 2024-06-04 PROCEDURE — 85730 THROMBOPLASTIN TIME PARTIAL: CPT | Performed by: EMERGENCY MEDICINE

## 2024-06-04 PROCEDURE — 83880 ASSAY OF NATRIURETIC PEPTIDE: CPT | Performed by: EMERGENCY MEDICINE

## 2024-06-04 PROCEDURE — 71045 X-RAY EXAM CHEST 1 VIEW: CPT

## 2024-06-04 PROCEDURE — 85025 COMPLETE CBC W/AUTO DIFF WBC: CPT | Performed by: EMERGENCY MEDICINE

## 2024-06-04 PROCEDURE — 99285 EMERGENCY DEPT VISIT HI MDM: CPT

## 2024-06-04 PROCEDURE — 99285 EMERGENCY DEPT VISIT HI MDM: CPT | Performed by: EMERGENCY MEDICINE

## 2024-06-04 RX ORDER — ACETAMINOPHEN 325 MG/1
650 TABLET ORAL EVERY 6 HOURS PRN
Status: DISCONTINUED | OUTPATIENT
Start: 2024-06-04 | End: 2024-06-06 | Stop reason: HOSPADM

## 2024-06-04 RX ORDER — TIMOLOL MALEATE 5 MG/ML
1 SOLUTION/ DROPS OPHTHALMIC DAILY
Status: DISCONTINUED | OUTPATIENT
Start: 2024-06-05 | End: 2024-06-06 | Stop reason: HOSPADM

## 2024-06-04 RX ORDER — ONDANSETRON 2 MG/ML
4 INJECTION INTRAMUSCULAR; INTRAVENOUS EVERY 6 HOURS PRN
Status: DISCONTINUED | OUTPATIENT
Start: 2024-06-04 | End: 2024-06-06 | Stop reason: HOSPADM

## 2024-06-04 RX ORDER — INSULIN LISPRO 100 [IU]/ML
1-5 INJECTION, SOLUTION INTRAVENOUS; SUBCUTANEOUS
Status: DISCONTINUED | OUTPATIENT
Start: 2024-06-04 | End: 2024-06-05

## 2024-06-04 RX ORDER — DOCUSATE SODIUM 100 MG/1
100 CAPSULE, LIQUID FILLED ORAL 2 TIMES DAILY PRN
Status: DISCONTINUED | OUTPATIENT
Start: 2024-06-04 | End: 2024-06-06 | Stop reason: HOSPADM

## 2024-06-04 RX ORDER — LATANOPROST 50 UG/ML
1 SOLUTION/ DROPS OPHTHALMIC
Status: DISCONTINUED | OUTPATIENT
Start: 2024-06-04 | End: 2024-06-06 | Stop reason: HOSPADM

## 2024-06-04 RX ORDER — ALPRAZOLAM 0.5 MG/1
0.5 TABLET ORAL
Status: DISCONTINUED | OUTPATIENT
Start: 2024-06-04 | End: 2024-06-06 | Stop reason: HOSPADM

## 2024-06-04 RX ORDER — INSULIN GLARGINE 100 [IU]/ML
5 INJECTION, SOLUTION SUBCUTANEOUS
Status: DISCONTINUED | OUTPATIENT
Start: 2024-06-04 | End: 2024-06-05

## 2024-06-04 RX ORDER — ATORVASTATIN CALCIUM 40 MG/1
40 TABLET, FILM COATED ORAL
Status: DISCONTINUED | OUTPATIENT
Start: 2024-06-04 | End: 2024-06-06 | Stop reason: HOSPADM

## 2024-06-04 RX ORDER — FUROSEMIDE 10 MG/ML
40 INJECTION INTRAMUSCULAR; INTRAVENOUS DAILY
Status: DISCONTINUED | OUTPATIENT
Start: 2024-06-05 | End: 2024-06-05

## 2024-06-04 RX ORDER — POLYETHYLENE GLYCOL 3350 17 G/17G
17 POWDER, FOR SOLUTION ORAL DAILY
Status: DISCONTINUED | OUTPATIENT
Start: 2024-06-05 | End: 2024-06-06 | Stop reason: HOSPADM

## 2024-06-04 RX ORDER — ASCORBIC ACID 500 MG
500 TABLET ORAL DAILY
Status: DISCONTINUED | OUTPATIENT
Start: 2024-06-05 | End: 2024-06-06 | Stop reason: HOSPADM

## 2024-06-04 RX ORDER — TAMSULOSIN HYDROCHLORIDE 0.4 MG/1
0.4 CAPSULE ORAL
Status: DISCONTINUED | OUTPATIENT
Start: 2024-06-04 | End: 2024-06-06 | Stop reason: HOSPADM

## 2024-06-04 RX ORDER — ALLOPURINOL 100 MG/1
100 TABLET ORAL 2 TIMES DAILY
Status: DISCONTINUED | OUTPATIENT
Start: 2024-06-04 | End: 2024-06-06 | Stop reason: HOSPADM

## 2024-06-04 RX ORDER — FERROUS SULFATE 325(65) MG
325 TABLET ORAL
Status: DISCONTINUED | OUTPATIENT
Start: 2024-06-05 | End: 2024-06-06 | Stop reason: HOSPADM

## 2024-06-04 RX ORDER — DOCUSATE SODIUM 100 MG/1
100 CAPSULE, LIQUID FILLED ORAL 2 TIMES DAILY
Status: DISCONTINUED | OUTPATIENT
Start: 2024-06-04 | End: 2024-06-06 | Stop reason: HOSPADM

## 2024-06-04 RX ADMIN — ATORVASTATIN CALCIUM 40 MG: 40 TABLET, FILM COATED ORAL at 18:31

## 2024-06-04 RX ADMIN — LATANOPROST 1 DROP: 50 SOLUTION OPHTHALMIC at 21:35

## 2024-06-04 RX ADMIN — ALLOPURINOL 100 MG: 100 TABLET ORAL at 18:31

## 2024-06-04 RX ADMIN — INSULIN LISPRO 1 UNITS: 100 INJECTION, SOLUTION INTRAVENOUS; SUBCUTANEOUS at 21:35

## 2024-06-04 RX ADMIN — INSULIN LISPRO 3 UNITS: 100 INJECTION, SOLUTION INTRAVENOUS; SUBCUTANEOUS at 19:02

## 2024-06-04 RX ADMIN — TAMSULOSIN HYDROCHLORIDE 0.4 MG: 0.4 CAPSULE ORAL at 18:30

## 2024-06-04 RX ADMIN — INSULIN GLARGINE 5 UNITS: 100 INJECTION, SOLUTION SUBCUTANEOUS at 21:35

## 2024-06-04 RX ADMIN — APIXABAN 2.5 MG: 2.5 TABLET, FILM COATED ORAL at 18:31

## 2024-06-04 RX ADMIN — DOCUSATE SODIUM 100 MG: 100 CAPSULE, LIQUID FILLED ORAL at 18:30

## 2024-06-04 NOTE — ASSESSMENT & PLAN NOTE
Patient follows with Dr. Guadarrama nephrology outpatient.  Per outpatient recs,  Low 2 g sodium diet  Fluid restriction 1800 mL/day  Continue to monitor

## 2024-06-04 NOTE — ED PROVIDER NOTES
History  Chief Complaint   Patient presents with    Medication Management     Py arrived EMS from home. Pt primary care giver is going to  facility and can no longer care for father. PD requested pt be transported to hospital to find care for pt due to no being able to be home alone.      84 yo male with history of CHF, Afib, DM sent in by visiting nurse.  Nurse was at house and pt's caregiver confided in them that he was suicidal and going to get help to get admitted to [facility].  Nurse called 911 for them to bring pt. In for temporary placement because he can't take care of himself and he can't be at home alone.  I spoke with son and confirmed this information.  Son says the pt. Uses walker baseline, can feed himself and has not been officially diagnosed with dementia. Pt. Without complaints.      History provided by:  Patient, caregiver and EMS personnel   used: No        Prior to Admission Medications   Prescriptions Last Dose Informant Patient Reported? Taking?   ALPRAZolam (XANAX) 0.5 mg tablet  Child Yes No   Si.5 mg daily at bedtime as needed for anxiety or sleep   Blood Pressure Monitor NILTON  Child No No   Sig: by Does not apply route daily   Eliquis 2.5 MG  Child No No   Sig: TAKE ONE TABLET BY MOUTH TWICE A DAY   Semgltyler, yfgn, 100 UNIT/ML SOPN  Child Yes No   ZINC OXIDE PO  Child Yes No   Sig: Take by mouth daily   allopurinol (ZYLOPRIM) 100 mg tablet  Child Yes No   Sig: Take 100 mg by mouth 2 (two) times a day   ascorbic acid (VITAMIN C) 500 MG tablet  Child No No   Sig: Take 1 tablet (500 mg total) by mouth daily   atorvastatin (LIPITOR) 40 mg tablet  Child No No   Sig: Take 1 tablet (40 mg total) by mouth daily with dinner   cholecalciferol (VITAMIN D3) 1,000 units tablet  Child No No   Sig: Take 2 tablets (2,000 Units total) by mouth daily Do not start before 2023.   docusate sodium (COLACE) 100 mg capsule  Child No No   Sig: Take 1 capsule (100 mg total) by mouth 2  (two) times a day as needed for constipation   Patient not taking: Reported on 10/25/2023   ferrous sulfate 325 (65 Fe) mg tablet  Child Yes No   Sig: Take 325 mg by mouth daily with breakfast   glimepiride (AMARYL) 2 mg tablet  Child No No   Sig: Take 1 tablet (2 mg total) by mouth daily with dinner   Patient not taking: Reported on 2024   glimepiride (AMARYL) 4 mg tablet  Child No No   Sig: Take 1 tablet (4 mg total) by mouth daily with breakfast   Patient taking differently: Take 4 mg by mouth 2 (two) times a day   halobetasol (ULTRAVATE) 0.05 % cream  Child Yes No   insulin glargine (LANTUS) 100 units/mL subcutaneous injection  Child No No   Sig: Inject 5 Units under the skin daily at bedtime   insulin lispro (HumaLOG) 100 units/mL injection  Child No No   Sig: Inject 1-5 Units under the skin 3 (three) times a day before meals   insulin lispro (HumaLOG) 100 units/mL injection  Child No No   Sig: Inject 1-5 Units under the skin daily at bedtime   latanoprost (XALATAN) 0.005 % ophthalmic solution  Child Yes No   Si drop daily at bedtime   sitaGLIPtin (JANUVIA) 25 mg tablet  Child No No   Sig: Take 1 tablet (25 mg total) by mouth daily Do not start before 2023.   Patient not taking: Reported on 2023   tamsulosin (FLOMAX) 0.4 mg  Child Yes No   Sig: Take 0.4 mg by mouth daily with dinner   timolol (TIMOPTIC) 0.5 % ophthalmic solution  Child No No   Sig: Administer 1 drop to both eyes daily   torsemide 40 MG TABS  Child No No   Sig: Take 80 mg by mouth daily Do not start before 2023.   Patient taking differently: Take 40 mg by mouth daily Alternating 40 m g. And 60 mg. Every other day      Facility-Administered Medications: None       Past Medical History:   Diagnosis Date    Atrial fibrillation (HCC)     Chronic kidney disease     Coronary artery disease     Diabetes mellitus (HCC)     Hyperlipidemia     Hypertension        Past Surgical History:   Procedure Laterality Date     CARDIAC CATHETERIZATION N/A 6/30/2023    Procedure: Cardiac pericardiocentesis;  Surgeon: Shanna Adame DO;  Location: [facility];  Service: Cardiology    CARDIAC CATHETERIZATION N/A 6/30/2023    Procedure: Cardiac RHC;  Surgeon: Shanna Adame DO;  Location: [facility];  Service: Cardiology    EYE SURGERY      IR CHEST TUBE PLACEMENT  6/24/2023    IR CHEST TUBE PLACEMENT  7/28/2023    IR PLEURAL EFFUSION LONG-TERM CATHETER PLACEMENT  8/7/2023    IR PLEURAL EFFUSION LONG-TERM CATHETER REMOVAL  1/3/2024    IR THORACENTESIS  12/11/2023    SKIN CANCER EXCISION      TONSILLECTOMY         Family History   Problem Relation Age of Onset    Heart disease Mother     Diabetes Father     Vision loss Father     Cancer Sister     Diabetes Sister      I have reviewed and agree with the history as documented.    E-Cigarette/Vaping    E-Cigarette Use Never User      E-Cigarette/Vaping Substances    Nicotine No     THC No     CBD No     Flavoring No     Other No     Unknown No      Social History     Tobacco Use    Smoking status: Never    Smokeless tobacco: Former    Tobacco comments:     Smoked a pipe 50 years ago   Vaping Use    Vaping status: Never Used   Substance Use Topics    Alcohol use: Not Currently     Alcohol/week: 0.0 standard drinks of alcohol     Comment: special occasions    Drug use: Not Currently       Review of Systems   Constitutional: Negative.  Negative for chills and fever.   HENT: Negative.  Negative for congestion and sore throat.    Eyes: Negative.    Respiratory: Negative.  Negative for cough and shortness of breath.    Cardiovascular: Negative.  Negative for chest pain and leg swelling.   Gastrointestinal: Negative.  Negative for abdominal pain, diarrhea, nausea and vomiting.   Genitourinary: Negative.  Negative for dysuria, flank pain and hematuria.   Musculoskeletal:  Positive for gait problem. Negative for back pain and myalgias.   Skin: Negative.  Negative for rash and  wound.   Neurological:  Positive for weakness. Negative for dizziness and headaches.   Psychiatric/Behavioral: Negative.  Negative for confusion and hallucinations. The patient is not nervous/anxious.    All other systems reviewed and are negative.      Physical Exam  Physical Exam  Vitals and nursing note reviewed.   Constitutional:       General: He is not in acute distress.     Appearance: He is well-developed. He is not ill-appearing or diaphoretic.   HENT:      Head: Normocephalic and atraumatic.   Eyes:      General: No scleral icterus.     Conjunctiva/sclera: Conjunctivae normal.   Cardiovascular:      Rate and Rhythm: Normal rate and regular rhythm.      Heart sounds: Normal heart sounds. No murmur heard.  Pulmonary:      Effort: Pulmonary effort is normal. No respiratory distress.      Breath sounds: Decreased breath sounds present.   Abdominal:      General: Bowel sounds are normal. There is no distension.      Palpations: Abdomen is soft.      Tenderness: There is no abdominal tenderness.   Musculoskeletal:         General: No tenderness or deformity. Normal range of motion.      Cervical back: Normal range of motion and neck supple.   Skin:     General: Skin is warm and dry.      Coloration: Skin is not pale.      Findings: No erythema or rash.      Comments: Chronic stasis wounds/changes, no evidence of cellulitis   Neurological:      General: No focal deficit present.      Mental Status: He is alert and oriented to person, place, and time.      Cranial Nerves: No cranial nerve deficit.      Comments: Motor/speech intact, no drift, no droop   Psychiatric:         Mood and Affect: Mood normal.         Behavior: Behavior normal.         Vital Signs  ED Triage Vitals [06/04/24 1232]   Temperature Pulse Respirations Blood Pressure SpO2   97.5 °F (36.4 °C) 70 18 126/68 99 %      Temp Source Heart Rate Source Patient Position - Orthostatic VS BP Location FiO2 (%)   Oral Monitor Lying Right arm --      Pain  Score       No Pain           Vitals:    06/04/24 1232 06/04/24 1523 06/04/24 1600   BP: 126/68 134/84 163/73   Pulse: 70 72 68   Patient Position - Orthostatic VS: Lying  Lying         Visual Acuity      ED Medications  Medications - No data to display    Diagnostic Studies  Results Reviewed       Procedure Component Value Units Date/Time    HS Troponin I 2hr [[id number]]  (Normal) Collected: 06/04/24 1523    Lab Status: Final result Specimen: Blood from Arm, Right Updated: 06/04/24 1552     hs TnI 2hr 16 ng/L      Delta 2hr hsTnI -1 ng/L     HS Troponin I 4hr [[id number]]     Lab Status: No result Specimen: Blood     HS Troponin 0hr (reflex protocol) [[id number]]  (Normal) Collected: 06/04/24 1311    Lab Status: Final result Specimen: Blood from Arm, Left Updated: 06/04/24 1347     hs TnI 0hr 17 ng/L     B-Type Natriuretic Peptide(BNP) [[id number]]  (Abnormal) Collected: 06/04/24 1311    Lab Status: Final result Specimen: Blood from Arm, Left Updated: 06/04/24 1345      pg/mL     Comprehensive metabolic panel [[id number]]  (Abnormal) Collected: 06/04/24 1311    Lab Status: Final result Specimen: Blood from Arm, Left Updated: 06/04/24 1345     Sodium 134 mmol/L      Potassium 4.4 mmol/L      Chloride 95 mmol/L      CO2 35 mmol/L      ANION GAP 4 mmol/L      BUN 50 mg/dL      Creatinine 1.90 mg/dL      Glucose 257 mg/dL      Calcium 8.1 mg/dL      Corrected Calcium 8.7 mg/dL      AST 19 U/L      ALT 24 U/L      Alkaline Phosphatase 139 U/L      Total Protein 5.7 g/dL      Albumin 3.2 g/dL      Total Bilirubin 0.74 mg/dL      eGFR 31 ml/min/1.73sq m     Narrative:      National Kid[employer] Foundation guidelines for Chronic Kidney Disease (CKD):     Stage 1 with normal or high GFR (GFR > 90 mL/min/1.73 square meters)    Stage 2 Mild CKD (GFR = 60-89 mL/min/1.73 square meters)    Stage 3A Moderate CKD (GFR = 45-59 mL/min/1.73 square meters)    Stage 3B Moderate CKD (GFR = 30-44 mL/min/1.73 square meters)    Stage  4 Severe CKD (GFR = 15-29 mL/min/1.73 square meters)    Stage 5 End Stage CKD (GFR <15 mL/min/1.73 square meters)  Note: GFR calculation is accurate only with a steady state creatinine    Protime-INR [[id number]]  (Abnormal) Collected: 06/04/24 1311    Lab Status: Final result Specimen: Blood from Arm, Left Updated: 06/04/24 1340     Protime 18.6 seconds      INR 1.53    APTT [[id number]]  (Abnormal) Collected: 06/04/24 1311    Lab Status: Final result Specimen: Blood from Arm, Left Updated: 06/04/24 1340     PTT 45 seconds     CBC and differential [[id number]]  (Abnormal) Collected: 06/04/24 1311    Lab Status: Final result Specimen: Blood from Arm, Left Updated: 06/04/24 1318     WBC 5.27 Thousand/uL      RBC 3.36 Million/uL      Hemoglobin 10.8 g/dL      Hematocrit 33.6 %       fL      MCH 32.1 pg      MCHC 32.1 g/dL      RDW 15.3 %      MPV 10.0 fL      Platelets 130 Thousands/uL      nRBC 0 /100 WBCs      Segmented % 67 %      Immature Grans % 0 %      Lymphocytes % 19 %      Monocytes % 12 %      Eosinophils Relative 2 %      Basophils Relative 0 %      Absolute Neutrophils 3.50 Thousands/µL      Absolute Immature Grans 0.01 Thousand/uL      Absolute Lymphocytes 1.01 Thousands/µL      Absolute Monocytes 0.64 Thousand/µL      Eosinophils Absolute 0.09 Thousand/µL      Basophils Absolute 0.02 Thousands/µL                    XR chest 1 view portable   ED Interpretation by Yamini Cheng MD (06/04 6137)   Effusions, CM                 Procedures  ECG 12 Lead Documentation Only    Date/Time: 6/4/2024 1:23 PM    Performed by: Yamini Cheng MD  Authorized by: Yamini Cheng MD    Indications / Diagnosis:  Weakness  ECG reviewed by me, the ED Provider: yes    Patient location:  ED  Previous ECG:     Previous ECG:  Unavailable  Interpretation:     Interpretation: abnormal    Rate:     ECG rate assessment: normal    Rhythm:     Rhythm: sinus rhythm    Ectopy:     Ectopy: none    QRS:     QRS axis:   Normal  Conduction:     Conduction: normal    ST segments:     ST segments:  Normal  T waves:     T waves: normal             ED Course             HEART Risk Score      Flowsheet Row Most Recent Value   Heart Score Risk Calculator    History 0 Filed at: 06/04/2024 1636   ECG 0 Filed at: 06/04/2024 1636   Age 2 Filed at: 06/04/2024 1636   Risk Factors 2 Filed at: 06/04/2024 1636   Troponin 1 Filed at: 06/04/2024 1636   HEART Score 5 Filed at: 06/04/2024 1636                          SBIRT 20yo+      Flowsheet Row Most Recent Value   Initial Alcohol Screen: US AUDIT-C     1. How often do you have a drink containing alcohol? 0 Filed at: 06/04/2024 1225   2. How many drinks containing alcohol do you have on a typical day you are drinking?  0 Filed at: 06/04/2024 1225   3a. Male UNDER 65: How often do you have five or more drinks on one occasion? 0 Filed at: 06/04/2024 1225   3b. FEMALE Any Age, or MALE 65+: How often do you have 4 or more drinks on one occassion? 0 Filed at: 06/04/2024 1225   Audit-C Score 0 Filed at: 06/04/2024 1225   ALEJANDRO: How many times in the past year have you...    Used an illegal drug or used a prescription medication for non-medical reasons? Never Filed at: 06/04/2024 1225                      Medical Decision Making  Discussed with case management and they said due to insurance pt. Will need IP admission.  Discussed with [facility].  Labs and chest xray reviewed.    Amount and/or Complexity of Data Reviewed  Labs: ordered.  Radiology: ordered and independent interpretation performed.    Risk  Decision regarding hospitalization.             Disposition  Final diagnoses:   Generalized weakness   Pleural effusion   Difficulty walking     Time reflects when diagnosis was documented in both MDM as applicable and the Disposition within this note       Time User Action Codes Description Comment    6/4/2024  2:37 PM Yamini Cheng Add [J44.9] COPD (chronic obstructive pulmonary disease) ([facility])     6/4/2024   2:37 PM Yamini Cheng Remove [J44.9] COPD (chronic obstructive pulmonary disease) (HCC)     6/4/2024  2:39 PM Yamini Chneg Add [R53.1] Generalized weakness     6/4/2024  2:39 PM Yamini Cheng Add [J90] Pleural effusion     6/4/2024  2:39 PM Yamini Cheng Add [R26.2] Difficulty walking           ED Disposition       ED Disposition   Admit    Condition   Stable    Date/Time   Tue Jun 4, 2024 3413    Comment   Case was discussed with FRANKLYN and the patient's admission status was agreed to be Admission Status: inpatient status              Follow-up Information    None         Patient's Medications   Discharge Prescriptions    No medications on file       No discharge procedures on file.    PDMP Review       None            ED Provider  Electronically Signed by             Yamini Cheng MD  06/04/24 [id number]       Yamini Cheng MD  06/04/24 [id number]

## 2024-06-04 NOTE — ASSESSMENT & PLAN NOTE
Wt Readings from Last 3 Encounters:   04/29/24 69.9 kg (154 lb)   01/23/24 69.9 kg (154 lb)   12/11/23 70.7 kg (155 lb 13.8 oz)     Holding home torsemide    Chest x-ray showed moderate cardiomegaly. Mild pulmonary venous congestion with small effusions and bibasilar atelectasis.  Start Lasix IV 40 mg daily  Daily weights  Strict NERY's  CHF education  Low-sodium diet  Fluid restriction 1800 mL/day

## 2024-06-04 NOTE — ASSESSMENT & PLAN NOTE
"Lab Results   Component Value Date    HGBA1C 8.8 (H) 11/20/2023       No results for input(s): \"POCGLU\" in the last 72 hours.    Blood Sugar Average: Last 72 hrs:    Holding metformin and glimepiride  Accu-Cheks ACHS  Lantus 5 units at bedtime  SSI  Carbohydrate controlled diet  Hypoglycemia protocol  "

## 2024-06-04 NOTE — ASSESSMENT & PLAN NOTE
Patient presented to ED referred by [facility] nurse.  Reports that nurse was at the house today and patient's son who is the caregiver and POA confided in the notes that he wants to seek psychiatric help for himself as as he was suicidal and [facility].  [facility] nurse called 911 to bring in the patient for temporary placement because he cannot take care of himself neither can he  stay at home alone.  ED provider spoke with son and confirmed the information.  CM following, patient requires inpatient admission for temporary placement.  Supportive care

## 2024-06-04 NOTE — H&P
Formerly [facility], later [facility]  H&P  Name: Yao Tipton 83 y.o. male I MRN: [id number]  Unit/Bed#: [address] [place] [address] I Date of Admission: 6/4/2024   Date of Service: 6/4/2024 I Hospital Day: 0      Assessment & Plan   * Unable to care for self  Assessment & Plan  Patient presented to ED referred by [facility] nurse.  Reports that nurse was at the house today and patient's son who is the caregiver and POA confided in the notes that he wants to seek psychiatric help for himself as as he was suicidal and going to get admission at [facility].  [facility]NA nurse called 911 to bring in the patient for temporary placement because he cannot take care of himself neither can he  stay at home alone.  ED provider spoke with son and confirmed the information.  CM following, patient requires inpatient admission for temporary placement.  Supportive care    Chronic kidney disease, stage 4 (severe) (HCC)  Assessment & Plan  Lab Results   Component Value Date    EGFR 31 06/04/2024    EGFR 25 12/11/2023    EGFR 26 11/22/2023    CREATININE 1.90 (H) 06/04/2024    CREATININE 2.23 (H) 12/11/2023    CREATININE 2.19 (H) 11/22/2023     Baseline noted to be 2.5 - 3.  Admission creatinine today 1.90  Follows with Dr. Guadarrmaa nephrology outpatient  Continue to monitor  Daily BMP  May consider nephrology consult if hyponatremia does not resolve or creatinine increases    Chronic combined systolic and diastolic congestive heart failure (HCC)  Assessment & Plan  Wt Readings from Last 3 Encounters:   04/29/24 69.9 kg (154 lb)   01/23/24 69.9 kg (154 lb)   12/11/23 70.7 kg (155 lb 13.8 oz)     Holding home torsemide    Chest x-ray showed moderate cardiomegaly. Mild pulmonary venous congestion with small effusions and bibasilar atelectasis.  Start Lasix IV 40 mg daily  Daily weights  Strict NERY's  CHF education  Low-sodium diet  Fluid restriction 1800 mL/day        Hyponatremia  Assessment & Plan  Patient follows with Dr. Guadarrama nephrology outpatient.  Per outpatient  "recs,  Low 2 g sodium diet  Fluid restriction 1800 mL/day  Continue to monitor      Gout  Assessment & Plan  Continue allopurinol    BPH (benign prostatic hyperplasia)  Assessment & Plan  Continue tamsulosin    Dyslipidemia  Assessment & Plan  Continue statin    Chronic atrial fibrillation (HCC)  Assessment & Plan  Continue Eliquis    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 (HCC)  Assessment & Plan  Lab Results   Component Value Date    HGBA1C 8.8 (H) 11/20/2023       No results for input(s): \"POCGLU\" in the last 72 hours.    Blood Sugar Average: Last 72 hrs:    Holding metformin and glimepiride  Accu-Cheks ACHS  Lantus 5 units at bedtime  SSI  Carbohydrate controlled diet  Hypoglycemia protocol    Essential hypertension  Assessment & Plan  Hold home torsemide  Start Lasix IV 40 mg daily  Monitor BP           VTE Pharmacologic Prophylaxis:   High Risk (Score >/= 5) - Pharmacological DVT Prophylaxis Ordered: apixaban (Eliquis). Sequential Compression Devices Ordered.  Code Status: Level 3 - DNAR and DNI   Discussion with family: ED provider reached out to son    Anticipated Length of Stay: Patient will be admitted on an inpatient basis with an anticipated length of stay of greater than 2 midnights secondary to placement.    Total Time Spent on Date of Encounter in care of patient:  mins. This time was spent on one or more of the following: performing physical exam; counseling and coordination of care; obtaining or reviewing history; documenting in the medical record; reviewing/ordering tests, medications or procedures; communicating with other healthcare professionals and discussing with patient's family/caregivers.    Chief Complaint: Unable to care for self    History of Present Illness:  Yao Tipton is a 83 y.o. male with a PMH of gout, atrial fibrillation on Eliquis, dyslipidemia, diabetes, BPH, hypertension, CHF, CKD who presents with inability to care for self or " medication management.  Per report, [facility] nurse in-house today called 911 to bring patient to the hospital for temporary placement as son confided in the nurse that he needs to seek psychiatric help for himself as he was suicidal and [facility].  ED provider reached out to  who stated that patient required IP admission for temporary placement.  Admission lab works revealed , creatinine 1.90 and sodium 134.  Chest x-ray with moderate cardiomegaly and mild pulmonary venous congestion with small effusions and bibasilar atelectasis.  Patient will be [facility] to [facility] services on level 3 code for further management and placement.      Review of Systems:  Review of Systems   Respiratory:  Positive for shortness of breath.    Musculoskeletal:  Positive for gait problem.   Neurological:  Positive for weakness.       Past Medical and Surgical History:   Past Medical History:   Diagnosis Date    Atrial fibrillation (HCC)     Chronic kidney disease     Coronary artery disease     Diabetes mellitus (HCC)     Hyperlipidemia     Hypertension        Past Surgical History:   Procedure Laterality Date    CARDIAC CATHETERIZATION N/A 6/30/2023    Procedure: Cardiac pericardiocentesis;  Surgeon: Shanna Adame DO;  Location: [facility];  Service: Cardiology    CARDIAC CATHETERIZATION N/A 6/30/2023    Procedure: Cardiac RHC;  Surgeon: Shanna Adame DO;  Location: [facility];  Service: Cardiology    EYE SURGERY      IR CHEST TUBE PLACEMENT  6/24/2023    IR CHEST TUBE PLACEMENT  7/28/2023    IR PLEURAL EFFUSION LONG-TERM CATHETER PLACEMENT  8/7/2023    IR PLEURAL EFFUSION LONG-TERM CATHETER REMOVAL  1/3/2024    IR THORACENTESIS  12/11/2023    SKIN CANCER EXCISION      TONSILLECTOMY         Meds/Allergies:  Prior to Admission medications    Medication Sig Start Date End Date Taking? Authorizing Provider   allopurinol (ZYLOPRIM) 100 mg tablet Take 100 mg by mouth 2 (two) times a  day    Historical Provider, MD   ALPRAZolam (XANAX) 0.5 mg tablet 0.5 mg daily at bedtime as needed for anxiety or sleep 3/12/18   Historical Provider, MD   ascorbic acid (VITAMIN C) 500 MG tablet Take 1 tablet (500 mg total) by mouth daily 1/8/21   Anna Dahl MD   atorvastatin (LIPITOR) 40 mg tablet Take 1 tablet (40 mg total) by mouth daily with dinner 1/16/19   Phoebe Perez,    Blood Pressure Monitor NILTON by Does not apply route daily 2/19/20   Fredi Guadarrama MD   cholecalciferol (VITAMIN D3) 1,000 units tablet Take 2 tablets (2,000 Units total) by mouth daily Do not start before July 12, 2023. 7/12/23 4/29/24  Yousuf Pressley MD   docusate sodium (COLACE) 100 mg capsule Take 1 capsule (100 mg total) by mouth 2 (two) times a day as needed for constipation  Patient not taking: Reported on 10/25/2023 8/10/23   Katrina Lobo PA-C   Eliquis 2.5 MG TAKE ONE TABLET BY MOUTH TWICE A DAY 7/10/23   Norma Nava MD   ferrous sulfate 325 (65 Fe) mg tablet Take 325 mg by mouth daily with breakfast    Historical Provider, MD   glimepiride (AMARYL) 2 mg tablet Take 1 tablet (2 mg total) by mouth daily with dinner  Patient not taking: Reported on 4/29/2024 2/27/23   Anna Dahl MD   glimepiride (AMARYL) 4 mg tablet Take 1 tablet (4 mg total) by mouth daily with breakfast  Patient taking differently: Take 4 mg by mouth 2 (two) times a day 2/27/23   Anna Dahl MD   halobetasol (ULTRAVATE) 0.05 % cream  12/7/23   Historical Provider, MD   insulin glargine (LANTUS) 100 units/mL subcutaneous injection Inject 5 Units under the skin daily at bedtime 8/10/23   Katrina Lobo PA-C   insulin lispro (HumaLOG) 100 units/mL injection Inject 1-5 Units under the skin 3 (three) times a day before meals 11/22/23   SUSANA Mcfarlane   insulin lispro (HumaLOG) 100 units/mL injection Inject 1-5 Units under the skin daily at bedtime 11/22/23   SUSANA Mcfarlane   latanoprost (XALATAN) 0.005 % ophthalmic solution 1  drop daily at bedtime    Historical Provider, MD Alexander yfgn, 100 UNIT/ML SOPN  11/16/23   Historical Provider, MD   sitaGLIPtin (JANUVIA) 25 mg tablet Take 1 tablet (25 mg total) by mouth daily Do not start before February 28, 2023.  Patient not taking: Reported on 11/29/2023 2/28/23   Anna Dahl MD   tamsulosin (FLOMAX) 0.4 mg Take 0.4 mg by mouth daily with dinner    Historical Provider, MD   timolol (TIMOPTIC) 0.5 % ophthalmic solution Administer 1 drop to both eyes daily 1/7/21   Anna Dahl MD   torsemide 40 MG TABS Take 80 mg by mouth daily Do not start before November 23, 2023.  Patient taking differently: Take 40 mg by mouth daily Alternating 40 m g. And 60 mg. Every other day 11/23/23   SUSANA Mcfarlane   ZINC OXIDE PO Take by mouth daily    Historical Provider, MD ABARCA have reviewed home medications with patient personally.    Allergies:   Allergies   Allergen Reactions    Elemental Sulfur     Sulfa Antibiotics        Social History:  Marital Status:    Occupation:   Patient Pre-hospital Living Situation:   Patient Pre-hospital Level of Mobility:   Patient Pre-hospital Diet Restrictions:   Substance Use History:   Social History     Substance and Sexual Activity   Alcohol Use Not Currently    Alcohol/week: 0.0 standard drinks of alcohol    Comment: special occasions     Social History     Tobacco Use   Smoking Status Never   Smokeless Tobacco Former   Tobacco Comments    Smoked a pipe 50 years ago     Social History     Substance and Sexual Activity   Drug Use Not Currently       Family History:  Family History   Problem Relation Age of Onset    Heart disease Mother     Diabetes Father     Vision loss Father     Cancer Sister     Diabetes Sister        Physical Exam:     Vitals:   Blood Pressure: 163/73 (06/04/24 1600)  Pulse: 68 (06/04/24 1600)  Temperature: 97.5 °F (36.4 °C) (06/04/24 1232)  Temp Source: Oral (06/04/24 1232)  Respirations: 20 (06/04/24 1600)  SpO2: 100 %  (06/04/24 1600)    Physical Exam  Vitals and nursing note reviewed.   Constitutional:       General: He is not in acute distress.     Appearance: He is well-developed.   HENT:      Head: Normocephalic and atraumatic.   Eyes:      Conjunctiva/sclera: Conjunctivae normal.   Cardiovascular:      Rate and Rhythm: Normal rate and regular rhythm.      Heart sounds: No murmur heard.  Pulmonary:      Effort: No respiratory distress.      Breath sounds: Decreased breath sounds present.   Abdominal:      Palpations: Abdomen is soft.      Tenderness: There is no abdominal tenderness.   Musculoskeletal:         General: No swelling.      Cervical back: Neck supple.   Skin:     General: Skin is warm and dry.      Capillary Refill: Capillary refill takes less than 2 seconds.   Neurological:      Mental Status: He is alert.   Psychiatric:         Mood and Affect: Mood normal.          Additional Data:     Lab Results:  Results from last 7 days   Lab Units 06/04/24  1311   WBC Thousand/uL 5.27   HEMOGLOBIN g/dL 10.8*   HEMATOCRIT % 33.6*   PLATELETS Thousands/uL 130*   SEGS PCT % 67   LYMPHO PCT % 19   MONO PCT % 12   EOS PCT % 2     Results from last 7 days   Lab Units 06/04/24  1311   SODIUM mmol/L 134*   POTASSIUM mmol/L 4.4   CHLORIDE mmol/L 95*   CO2 mmol/L 35*   BUN mg/dL 50*   CREATININE mg/dL 1.90*   ANION GAP mmol/L 4   CALCIUM mg/dL 8.1*   ALBUMIN g/dL 3.2*   TOTAL BILIRUBIN mg/dL 0.74   ALK PHOS U/L 139*   ALT U/L 24   AST U/L 19   GLUCOSE RANDOM mg/dL 257*     Results from last 7 days   Lab Units 06/04/24  1311   INR  1.53*     Results from last 7 days   Lab Units 06/04/24  1834   POC GLUCOSE mg/dl 333*               Lines/Drains:  Invasive Devices       Peripheral Intravenous Line  Duration             Peripheral IV 06/04/24 Left;Ventral (anterior) Forearm <1 day                        Imaging: Reviewed radiology reports from this admission including: chest xray  XR chest 1 view portable   ED Interpretation by Yamini  MD Prosper (06/04 1406)   Effusions, CM      Final Result by Evelyn Lam MD (06/04 1705)      Moderate cardiomegaly.      Mild pulmonary venous congestion with small effusions and bibasilar atelectasis.            Workstation performed: [id number]             EKG and Other Studies Reviewed on Admission:   EKG: NSR. HR 70's.    ** Please Note: This note has been constructed using a voice recognition system. **

## 2024-06-04 NOTE — ASSESSMENT & PLAN NOTE
Lab Results   Component Value Date    EGFR 31 06/04/2024    EGFR 25 12/11/2023    EGFR 26 11/22/2023    CREATININE 1.90 (H) 06/04/2024    CREATININE 2.23 (H) 12/11/2023    CREATININE 2.19 (H) 11/22/2023     Baseline noted to be 2.5 - 3.  Admission creatinine today 1.90  Follows with Dr. Guadarrama nephrology outpatient  Continue to monitor  Daily BMP  May consider nephrology consult if hyponatremia does not resolve or creatinine increases

## 2024-06-05 PROBLEM — I50.43 ACUTE ON CHRONIC COMBINED SYSTOLIC AND DIASTOLIC CONGESTIVE HEART FAILURE (HCC): Status: ACTIVE | Noted: 2019-01-29

## 2024-06-05 LAB
ANION GAP SERPL CALCULATED.3IONS-SCNC: 4 MMOL/L (ref 4–13)
ATRIAL RATE: 277 BPM
BUN SERPL-MCNC: 51 MG/DL (ref 5–25)
CALCIUM SERPL-MCNC: 8 MG/DL (ref 8.4–10.2)
CHLORIDE SERPL-SCNC: 101 MMOL/L (ref 96–108)
CO2 SERPL-SCNC: 35 MMOL/L (ref 21–32)
CREAT SERPL-MCNC: 1.84 MG/DL (ref 0.6–1.3)
ERYTHROCYTE [DISTWIDTH] IN BLOOD BY AUTOMATED COUNT: 15.1 % (ref 11.6–15.1)
GFR SERPL CREATININE-BSD FRML MDRD: 32 ML/MIN/1.73SQ M
GLUCOSE SERPL-MCNC: 104 MG/DL (ref 65–140)
GLUCOSE SERPL-MCNC: 110 MG/DL (ref 65–140)
GLUCOSE SERPL-MCNC: 194 MG/DL (ref 65–140)
GLUCOSE SERPL-MCNC: 196 MG/DL (ref 65–140)
GLUCOSE SERPL-MCNC: 41 MG/DL (ref 65–140)
GLUCOSE SERPL-MCNC: 63 MG/DL (ref 65–140)
GLUCOSE SERPL-MCNC: 67 MG/DL (ref 65–140)
HCT VFR BLD AUTO: 33.2 % (ref 36.5–49.3)
HGB BLD-MCNC: 10.6 G/DL (ref 12–17)
MAGNESIUM SERPL-MCNC: 2.1 MG/DL (ref 1.9–2.7)
MCH RBC QN AUTO: 31.9 PG (ref 26.8–34.3)
MCHC RBC AUTO-ENTMCNC: 31.9 G/DL (ref 31.4–37.4)
MCV RBC AUTO: 100 FL (ref 82–98)
PHOSPHATE SERPL-MCNC: 3 MG/DL (ref 2.3–4.1)
PLATELET # BLD AUTO: 124 THOUSANDS/UL (ref 149–390)
PMV BLD AUTO: 9.7 FL (ref 8.9–12.7)
POTASSIUM SERPL-SCNC: 3.6 MMOL/L (ref 3.5–5.3)
QRS AXIS: 17 DEGREES
QRSD INTERVAL: 90 MS
QT INTERVAL: 406 MS
QTC INTERVAL: 435 MS
RBC # BLD AUTO: 3.32 MILLION/UL (ref 3.88–5.62)
SODIUM SERPL-SCNC: 140 MMOL/L (ref 135–147)
T WAVE AXIS: 40 DEGREES
VENTRICULAR RATE: 69 BPM
WBC # BLD AUTO: 4.8 THOUSAND/UL (ref 4.31–10.16)

## 2024-06-05 PROCEDURE — 99232 SBSQ HOSP IP/OBS MODERATE 35: CPT | Performed by: INTERNAL MEDICINE

## 2024-06-05 PROCEDURE — 84100 ASSAY OF PHOSPHORUS: CPT

## 2024-06-05 PROCEDURE — 85027 COMPLETE CBC AUTOMATED: CPT

## 2024-06-05 PROCEDURE — 93010 ELECTROCARDIOGRAM REPORT: CPT | Performed by: INTERNAL MEDICINE

## 2024-06-05 PROCEDURE — 80048 BASIC METABOLIC PNL TOTAL CA: CPT

## 2024-06-05 PROCEDURE — 82948 REAGENT STRIP/BLOOD GLUCOSE: CPT

## 2024-06-05 PROCEDURE — 83735 ASSAY OF MAGNESIUM: CPT

## 2024-06-05 RX ORDER — TORSEMIDE 20 MG/1
60 TABLET ORAL
Status: DISCONTINUED | OUTPATIENT
Start: 2024-06-07 | End: 2024-06-06 | Stop reason: HOSPADM

## 2024-06-05 RX ORDER — INSULIN LISPRO 100 [IU]/ML
1-5 INJECTION, SOLUTION INTRAVENOUS; SUBCUTANEOUS
Status: DISCONTINUED | OUTPATIENT
Start: 2024-06-05 | End: 2024-06-06 | Stop reason: HOSPADM

## 2024-06-05 RX ORDER — FUROSEMIDE 10 MG/ML
40 INJECTION INTRAMUSCULAR; INTRAVENOUS DAILY
Status: DISCONTINUED | OUTPATIENT
Start: 2024-06-06 | End: 2024-06-05

## 2024-06-05 RX ORDER — DEXTROSE MONOHYDRATE 25 G/50ML
50 INJECTION, SOLUTION INTRAVENOUS ONCE
Status: COMPLETED | OUTPATIENT
Start: 2024-06-05 | End: 2024-06-05

## 2024-06-05 RX ORDER — TORSEMIDE 20 MG/1
40 TABLET ORAL
Status: DISCONTINUED | OUTPATIENT
Start: 2024-06-06 | End: 2024-06-06 | Stop reason: HOSPADM

## 2024-06-05 RX ADMIN — ATORVASTATIN CALCIUM 40 MG: 40 TABLET, FILM COATED ORAL at 17:03

## 2024-06-05 RX ADMIN — DOCUSATE SODIUM 100 MG: 100 CAPSULE, LIQUID FILLED ORAL at 08:23

## 2024-06-05 RX ADMIN — POLYETHYLENE GLYCOL 3350 17 G: 17 POWDER, FOR SOLUTION ORAL at 08:24

## 2024-06-05 RX ADMIN — TIMOLOL MALEATE 1 DROP: 5 SOLUTION OPHTHALMIC at 08:24

## 2024-06-05 RX ADMIN — Medication 2000 UNITS: at 08:23

## 2024-06-05 RX ADMIN — FUROSEMIDE 40 MG: 10 INJECTION, SOLUTION INTRAMUSCULAR; INTRAVENOUS at 08:23

## 2024-06-05 RX ADMIN — TAMSULOSIN HYDROCHLORIDE 0.4 MG: 0.4 CAPSULE ORAL at 17:03

## 2024-06-05 RX ADMIN — DEXTROSE MONOHYDRATE 50 ML: 25 INJECTION, SOLUTION INTRAVENOUS at 08:06

## 2024-06-05 RX ADMIN — INSULIN LISPRO 1 UNITS: 100 INJECTION, SOLUTION INTRAVENOUS; SUBCUTANEOUS at 12:15

## 2024-06-05 RX ADMIN — ALLOPURINOL 100 MG: 100 TABLET ORAL at 17:03

## 2024-06-05 RX ADMIN — FERROUS SULFATE TAB 325 MG (65 MG ELEMENTAL FE) 325 MG: 325 (65 FE) TAB at 08:24

## 2024-06-05 RX ADMIN — LATANOPROST 1 DROP: 50 SOLUTION OPHTHALMIC at 21:16

## 2024-06-05 RX ADMIN — OXYCODONE HYDROCHLORIDE AND ACETAMINOPHEN 500 MG: 500 TABLET ORAL at 08:23

## 2024-06-05 RX ADMIN — ALLOPURINOL 100 MG: 100 TABLET ORAL at 08:23

## 2024-06-05 RX ADMIN — DOCUSATE SODIUM 100 MG: 100 CAPSULE, LIQUID FILLED ORAL at 17:03

## 2024-06-05 RX ADMIN — APIXABAN 2.5 MG: 2.5 TABLET, FILM COATED ORAL at 08:23

## 2024-06-05 RX ADMIN — APIXABAN 2.5 MG: 2.5 TABLET, FILM COATED ORAL at 17:03

## 2024-06-05 NOTE — ASSESSMENT & PLAN NOTE
Wt Readings from Last 3 Encounters:   06/04/24 68.4 kg (150 lb 14.4 oz)   04/29/24 69.9 kg (154 lb)   01/23/24 69.9 kg (154 lb)       CXR: Moderate cardiomegaly. Mild pulmonary venous congestion with small effusions and bibasilar atelectasis.  Oxygenating well on RA  Continue IV lasix 40mg daily. Transition to oral torsemide tomorrow  Daily weights, Strict NERY's  CHF education  Fluid restriction 1800 mL/day  Outpatient Cardiology follow up

## 2024-06-05 NOTE — ASSESSMENT & PLAN NOTE
Patient presented to ED referred by [facility] nurse.  Reports that nurse was at the house today and patient's son who is the caregiver and POA confided in the notes that he wants to seek psychiatric help for himself as as he was suicidal and [facility].  [facility] nurse called 911 to bring in the patient for temporary placement because he cannot take care of himself neither can he stay at home alone.  ED provider spoke with son and confirmed the information.  6/5/24- spoke to patient's son today. He is now home and able to take patient once he stable for discharge.   6/6/24- Son confirms they already have VNA services come to their home. Son will be transporting patient home today.

## 2024-06-05 NOTE — PLAN OF CARE
Problem: Potential for Falls  Goal: Patient will remain free of falls  Description: INTERVENTIONS:  - Educate patient/family on patient safety including physical limitations  - Instruct patient to call for assistance with activity   - Consult OT/PT to assist with strengthening/mobility   - Keep Call bell within reach  - Keep bed low and locked with side rails adjusted as appropriate  - Keep care items and personal belongings within reach  - Initiate and maintain comfort rounds  - Make Fall Risk Sign visible to staff  - Offer Toileting every 2 Hours, in advance of need  - Initiate/Maintain bed alarm  - Obtain necessary fall risk management equipment:   - Apply yellow socks and bracelet for high fall risk patients  - Consider moving patient to room near nurses station  Outcome: Progressing     Problem: PAIN - ADULT  Goal: Verbalizes/displays adequate comfort level or baseline comfort level  Description: Interventions:  - Encourage patient to monitor pain and request assistance  - Assess pain using appropriate pain scale  - Administer analgesics based on type and severity of pain and evaluate response  - Implement non-pharmacological measures as appropriate and evaluate response  - Consider cultural and social influences on pain and pain management  - Notify physician/advanced practitioner if interventions unsuccessful or patient reports new pain  Outcome: Progressing     Problem: INFECTION - ADULT  Goal: Absence or prevention of progression during hospitalization  Description: INTERVENTIONS:  - Assess and monitor for signs and symptoms of infection  - Monitor lab/diagnostic results  - Monitor all insertion sites, i.e. indwelling lines, tubes, and drains  - Monitor endotracheal if appropriate and nasal secretions for changes in amount and color  - [place] appropriate cooling/warming therapies per order  - Administer medications as ordered  - Instruct and encourage patient and family to use good hand hygiene  technique  - Identify and instruct in appropriate isolation precautions for identified infection/condition  Outcome: Progressing  Goal: Absence of fever/infection during neutropenic period  Description: INTERVENTIONS:  - Monitor WBC    Outcome: Progressing     Problem: SAFETY ADULT  Goal: Patient will remain free of falls  Description: INTERVENTIONS:  - Educate patient/family on patient safety including physical limitations  - Instruct patient to call for assistance with activity   - Consult OT/PT to assist with strengthening/mobility   - Keep Call bell within reach  - Keep bed low and locked with side rails adjusted as appropriate  - Keep care items and personal belongings within reach  - Initiate and maintain comfort rounds  - Make Fall Risk Sign visible to staff  - Offer Toileting every 2 Hours, in advance of need  - Initiate/Maintain bed alarm  - Obtain necessary fall risk management equipment:   - Apply yellow socks and bracelet for high fall risk patients  - Consider moving patient to room near nurses station  Outcome: Progressing  Goal: Maintain or return to baseline ADL function  Description: INTERVENTIONS:  -  Assess patient's ability to carry out ADLs; assess patient's baseline for ADL function and identify physical deficits which impact ability to perform ADLs (bathing, care of mouth/teeth, toileting, grooming, dressing, etc.)  - Assess/evaluate cause of self-care deficits   - Assess range of motion  - Assess patient's mobility; develop plan if impaired  - Assess patient's need for assistive devices and provide as appropriate  - Encourage maximum independence but intervene and supervise when necessary  - Involve family in performance of ADLs  - Assess for home care needs following discharge   - Consider OT consult to assist with ADL evaluation and planning for discharge  - Provide patient education as appropriate  Outcome: Progressing  Goal: Maintains/Returns to pre admission functional  level  Description: INTERVENTIONS:  - Perform AM-PAC 6 Click Basic Mobility/ Daily Activity assessment daily.  - Set and communicate daily mobility goal to care team and patient/family/caregiver.   - Collaborate with rehabilitation services on mobility goals if consulted  - Perform Range of Motion 3 times a day.  - Reposition patient every 2 hours.  - Dangle patient 3 times a day  - Stand patient 3 times a day  - Ambulate patient 3 times a day  - Out of bed to chair 3 times a day   - Out of bed for meals 3 times a day  - Out of bed for toileting  - Record patient progress and toleration of activity level   Outcome: Progressing     Problem: DISCHARGE PLANNING  Goal: Discharge to home or other facility with appropriate resources  Description: INTERVENTIONS:  - Identify barriers to discharge w/patient and caregiver  - Arrange for needed discharge resources and transportation as appropriate  - Identify discharge learning needs (meds, wound care, etc.)  - Arrange for interpretive services to assist at discharge as needed  - Refer to Case Management Department for coordinating discharge planning if the patient needs post-hospital services based on physician/advanced practitioner order or complex needs related to functional status, cognitive ability, or social support system  Outcome: Progressing     Problem: Knowledge Deficit  Goal: Patient/family/caregiver demonstrates understanding of disease process, treatment plan, medications, and discharge instructions  Description: Complete learning assessment and assess knowledge base.  Interventions:  - Provide teaching at level of understanding  - Provide teaching via preferred learning methods  Outcome: Progressing     Problem: NEUROSENSORY - ADULT  Goal: Achieves maximal functionality and self care  Description: INTERVENTIONS  - Monitor swallowing and airway patency with patient fatigue and changes in neurological status  - Encourage and assist patient to increase activity and  self care.   - Encourage visually impaired, hearing impaired and aphasic patients to use assistive/communication devices  Outcome: Progressing     Problem: RESPIRATORY - ADULT  Goal: Achieves optimal ventilation and oxygenation  Description: INTERVENTIONS:  - Assess for changes in respiratory status  - Assess for changes in mentation and behavior  - Position to facilitate oxygenation and minimize respiratory effort  - Oxygen administered by appropriate delivery if ordered  - Initiate smoking cessation education as indicated  - Encourage broncho-pulmonary hygiene including cough, deep breathe, Incentive Spirometry  - Assess the need for suctioning and aspirate as needed  - Assess and instruct to report SOB or any respiratory difficulty  - Respiratory Therapy support as indicated  Outcome: Progressing     Problem: GENITOURINARY - ADULT  Goal: Maintains or returns to baseline urinary function  Description: INTERVENTIONS:  - Assess urinary function  - Encourage oral fluids to ensure adequate hydration if ordered  - Administer IV fluids as ordered to ensure adequate hydration  - Administer ordered medications as needed  - Offer frequent toileting  - Follow urinary retention protocol if ordered  Outcome: Progressing  Goal: Absence of urinary retention  Description: INTERVENTIONS:  - Assess patient’s ability to void and empty bladder  - Monitor I/O  - Bladder scan as needed  - Discuss with physician/AP medications to alleviate retention as needed  - Discuss catheterization for long term situations as appropriate  Outcome: Progressing     Problem: METABOLIC, FLUID AND ELECTROLYTES - ADULT  Goal: Electrolytes maintained within normal limits  Description: INTERVENTIONS:  - Monitor labs and assess patient for signs and symptoms of electrolyte imbalances  - Administer electrolyte replacement as ordered  - Monitor response to electrolyte replacements, including repeat lab results as appropriate  - Instruct patient on fluid and  nutrition as appropriate  Outcome: Progressing  Goal: Fluid balance maintained  Description: INTERVENTIONS:  - Monitor labs   - Monitor I/O and WT  - Instruct patient on fluid and nutrition as appropriate  - Assess for signs & symptoms of volume excess or deficit  Outcome: Progressing  Goal: Glucose maintained within target range  Description: INTERVENTIONS:  - Monitor Blood Glucose as ordered  - Assess for signs and symptoms of hyperglycemia and hypoglycemia  - Administer ordered medications to maintain glucose within target range  - Assess nutritional intake and initiate nutrition service referral as needed  Outcome: Progressing     Problem: SKIN/TISSUE INTEGRITY - ADULT  Goal: Skin Integrity remains intact(Skin Breakdown Prevention)  Description: Assess:  -Perform Steven assessment every shift.  -Clean and moisturize skin every shift.  -Inspect skin when repositioning, toileting, and assisting with ADLS  -Assess extremities for adequate circulation and sensation     Bed Management:  -Have minimal linens on bed & keep smooth, unwrinkled  -Change linens as needed when moist or perspiring  -Avoid sitting or lying in one position for more than 2 hours while in bed  -Keep HOB at 45 degrees     Toileting:  -Offer bedside commode  -Assess for incontinence every shift,  -Use incontinent care products after each incontinent episode such as protective barrier.     Activity:  -Mobilize patient 3 times a day  -Encourage activity and walks on unit  -Encourage or provide ROM exercises   -Turn and reposition patient every 2 Hours  -Use appropriate equipment to lift or move patient in bed  -Instruct/ Assist with weight shifting every 2 when out of bed in chair  -Consider limitation of chair time 2 hour intervals  Next Steps:  -Teach patient strategies to minimize risks such as off loading.   -Consider consults to  interdisciplinary teams such as wound care team.  Outcome: Progressing  Goal: Incision(s), wounds(s) or drain  site(s) healing without S/S of infection  Description: INTERVENTIONS  - Assess and document dressing, incision, wound bed, drain sites and surrounding tissue  - Provide patient and family education  - Perform skin care/dressing changes per MD order and as needed.  Outcome: Progressing  Goal: Pressure injury heals and does not worsen  Description: Interventions:  - Implement low air loss mattress or specialty surface (Criteria met)  - Apply silicone foam dressing  - Instruct/assist with weight shifting every 120  minutes when in chair   - Limit chair time to 2 hour intervals  - Use special pressure reducing interventions such as waffle cushion when in chair   - Apply fecal or urinary incontinence containment device   - Perform passive or active ROM every shift.   - Turn and reposition patient & offload bony prominences every 2 hours   - Utilize friction reducing device or surface for transfers   - Consider consults to  interdisciplinary teams such as wound care team.   - Use incontinent care products after each incontinent episode such as protective barrier.   - Consider nutrition services referral as needed  Outcome: Progressing     Problem: HEMATOLOGIC - ADULT  Goal: Maintains hematologic stability  Description: INTERVENTIONS  - Assess for signs and symptoms of bleeding or hemorrhage  - Monitor labs  - Administer supportive blood products/factors as ordered and appropriate  Outcome: Progressing     Problem: MUSCULOSKELETAL - ADULT  Goal: Maintain or return mobility to safest level of function  Description: INTERVENTIONS:  - Assess patient's ability to carry out ADLs; assess patient's baseline for ADL function and identify physical deficits which impact ability to perform ADLs (bathing, care of mouth/teeth, toileting, grooming, dressing, etc.)  - Assess/evaluate cause of self-care deficits   - Assess range of motion  - Assess patient's mobility  - Assess patient's need for assistive devices and provide as  appropriate  - Encourage maximum independence but intervene and supervise when necessary  - Involve family in performance of ADLs  - Assess for home care needs following discharge   - Consider OT consult to assist with ADL evaluation and planning for discharge  - Provide patient education as appropriate  Outcome: Progressing  Goal: Maintain proper alignment of affected body part  Description: INTERVENTIONS:  - Support, maintain and protect limb and body alignment  - Provide patient/ family with appropriate education  Outcome: Progressing     Problem: MUSCULOSKELETAL - ADULT  Goal: Maintain or return mobility to safest level of function  Description: INTERVENTIONS:  - Assess patient's ability to carry out ADLs; assess patient's baseline for ADL function and identify physical deficits which impact ability to perform ADLs (bathing, care of mouth/teeth, toileting, grooming, dressing, etc.)  - Assess/evaluate cause of self-care deficits   - Assess range of motion  - Assess patient's mobility  - Assess patient's need for assistive devices and provide as appropriate  - Encourage maximum independence but intervene and supervise when necessary  - Involve family in performance of ADLs  - Assess for home care needs following discharge   - Consider OT consult to assist with ADL evaluation and planning for discharge  - Provide patient education as appropriate  Outcome: Progressing  Goal: Maintain proper alignment of affected body part  Description: INTERVENTIONS:  - Support, maintain and protect limb and body alignment  - Provide patient/ family with appropriate education  Outcome: Progressing

## 2024-06-05 NOTE — ASSESSMENT & PLAN NOTE
Wt Readings from Last 3 Encounters:   06/04/24 68.4 kg (150 lb 14.4 oz)   04/29/24 69.9 kg (154 lb)   01/23/24 69.9 kg (154 lb)       CXR: Moderate cardiomegaly. Mild pulmonary venous congestion with small effusions and bibasilar atelectasis.  Oxygenating well on RA  S/p IV lasix  Euvolemic and transitioned back to home torsemide  Daily weights, Strict NERY's  CHF education  Outpatient Cardiology follow up

## 2024-06-05 NOTE — ASSESSMENT & PLAN NOTE
Lab Results   Component Value Date    EGFR 32 06/05/2024    EGFR 31 06/04/2024    EGFR 25 12/11/2023    CREATININE 1.84 (H) 06/05/2024    CREATININE 1.90 (H) 06/04/2024    CREATININE 2.23 (H) 12/11/2023     Baseline noted to be 2.5 - 3.  Creatinine 1.84, stable  Follows with Dr. Guadarrama nephrology outpatient  Continue to monitor  Daily BMP

## 2024-06-05 NOTE — ASSESSMENT & PLAN NOTE
Patient follows with Dr. Guadarrama nephrology outpatient. Per outpatient recommendations  Low 2 g sodium diet  Fluid restriction 1800 mL/day  Sodium improved 140 today  Daily BMP

## 2024-06-05 NOTE — ASSESSMENT & PLAN NOTE
Patient presented to ED referred [facility].  Reports that nurse was at the house today and patient's son who is the caregiver and POA confided in the notes that he wants to seek psychiatric help for himself as as he was suicidal and going [facility].  [facility] nurse called 911 to bring in the patient for temporary placement because he cannot take care of himself neither can he stay at home alone.  ED provider spoke with son and confirmed the information.  6/5/24- spoke to patient's son today. He is now home and able to take patient once he stable for discharge. Will plan for PT/OT to see patient and make recommendations. Son confirms they already have [facility] services come to their home  Supportive care  CM following for any discharge needs

## 2024-06-05 NOTE — ASSESSMENT & PLAN NOTE
Lab Results   Component Value Date    HGBA1C 8.8 (H) 11/20/2023       Recent Labs     06/04/24  1834 06/04/24  2107 06/05/24  0749 06/05/24  0957   POCGLU 333* 183* 41* 196*       Blood Sugar Average: Last 72 hrs:  (P) 188.25  Holding metformin and glimepiride  Episode of hypoglycemia on 6/5/24 AM with BG 41. Asymptomatic  Discontinue Lantus 5U   Continue with SSI and accu checks  Hypoglycemia protocol

## 2024-06-05 NOTE — ASSESSMENT & PLAN NOTE
Lab Results   Component Value Date    HGBA1C 8.8 (H) 11/20/2023       Recent Labs     06/04/24  2107 06/05/24  0749 06/05/24  0957 06/05/24  1129   POCGLU 183* 41* 196* 194*         Blood Sugar Average: Last 72 hrs:  (P) 189.4  Continue home oral agents  Episode of hypoglycemia on 6/5/24 AM with BG 41. Asymptomatic  Discontinue Lantus 5U at discharge  Continue with SSI and blood sugar checks  Hypoglycemia protocol  Follow up with PCP

## 2024-06-05 NOTE — PLAN OF CARE
Problem: PAIN - ADULT  Goal: Verbalizes/displays adequate comfort level or baseline comfort level  Description: Interventions:  - Encourage patient to monitor pain and request assistance  - Assess pain using appropriate pain scale  - Administer analgesics based on type and severity of pain and evaluate response  - Implement non-pharmacological measures as appropriate and evaluate response  - Consider cultural and social influences on pain and pain management  - Notify physician/advanced practitioner if interventions unsuccessful or patient reports new pain  Outcome: Progressing     Problem: INFECTION - ADULT  Goal: Absence or prevention of progression during hospitalization  Description: INTERVENTIONS:  - Assess and monitor for signs and symptoms of infection  - Monitor lab/diagnostic results  - Monitor all insertion sites, i.e. indwelling lines, tubes, and drains  - Monitor endotracheal if appropriate and nasal secretions for changes in amount and color  - [place] appropriate cooling/warming therapies per order  - Administer medications as ordered  - Instruct and encourage patient and family to use good hand hygiene technique  - Identify and instruct in appropriate isolation precautions for identified infection/condition  Outcome: Progressing  Goal: Absence of fever/infection during neutropenic period  Description: INTERVENTIONS:  - Monitor WBC    Outcome: Progressing

## 2024-06-05 NOTE — ASSESSMENT & PLAN NOTE
Patient follows with Dr. Guadarrama nephrology outpatient. Per outpatient recommendations  Low 2 g sodium diet  Fluid restriction 1800 mL/day  Sodium normalized  Daily BMP

## 2024-06-05 NOTE — ASSESSMENT & PLAN NOTE
Lab Results   Component Value Date    EGFR 29 06/06/2024    EGFR 32 06/05/2024    EGFR 31 06/04/2024    CREATININE 1.98 (H) 06/06/2024    CREATININE 1.84 (H) 06/05/2024    CREATININE 1.90 (H) 06/04/2024     Baseline noted to be 2.5 - 3.  Creatinine stable  Follows with Dr. Guadarrama nephrology outpatient  Continue to monitor

## 2024-06-06 VITALS
RESPIRATION RATE: 18 BRPM | HEART RATE: 89 BPM | OXYGEN SATURATION: 96 % | SYSTOLIC BLOOD PRESSURE: 136 MMHG | DIASTOLIC BLOOD PRESSURE: 64 MMHG | TEMPERATURE: 98 F | BODY MASS INDEX: 23.69 KG/M2 | WEIGHT: 150.9 LBS | HEIGHT: 67 IN

## 2024-06-06 PROBLEM — E87.1 HYPONATREMIA: Status: RESOLVED | Noted: 2023-02-22 | Resolved: 2024-06-06

## 2024-06-06 LAB
ANION GAP SERPL CALCULATED.3IONS-SCNC: 5 MMOL/L (ref 4–13)
BUN SERPL-MCNC: 51 MG/DL (ref 5–25)
CALCIUM SERPL-MCNC: 8.1 MG/DL (ref 8.4–10.2)
CHLORIDE SERPL-SCNC: 99 MMOL/L (ref 96–108)
CO2 SERPL-SCNC: 32 MMOL/L (ref 21–32)
CREAT SERPL-MCNC: 1.98 MG/DL (ref 0.6–1.3)
ERYTHROCYTE [DISTWIDTH] IN BLOOD BY AUTOMATED COUNT: 15.1 % (ref 11.6–15.1)
GFR SERPL CREATININE-BSD FRML MDRD: 29 ML/MIN/1.73SQ M
GLUCOSE SERPL-MCNC: 177 MG/DL (ref 65–140)
GLUCOSE SERPL-MCNC: 74 MG/DL (ref 65–140)
GLUCOSE SERPL-MCNC: 85 MG/DL (ref 65–140)
GLUCOSE SERPL-MCNC: 93 MG/DL (ref 65–140)
HCT VFR BLD AUTO: 34.1 % (ref 36.5–49.3)
HGB BLD-MCNC: 10.8 G/DL (ref 12–17)
MCH RBC QN AUTO: 31.7 PG (ref 26.8–34.3)
MCHC RBC AUTO-ENTMCNC: 31.7 G/DL (ref 31.4–37.4)
MCV RBC AUTO: 100 FL (ref 82–98)
PLATELET # BLD AUTO: 138 THOUSANDS/UL (ref 149–390)
PMV BLD AUTO: 9.9 FL (ref 8.9–12.7)
POTASSIUM SERPL-SCNC: 4 MMOL/L (ref 3.5–5.3)
RBC # BLD AUTO: 3.41 MILLION/UL (ref 3.88–5.62)
SODIUM SERPL-SCNC: 136 MMOL/L (ref 135–147)
WBC # BLD AUTO: 5.75 THOUSAND/UL (ref 4.31–10.16)

## 2024-06-06 PROCEDURE — 80048 BASIC METABOLIC PNL TOTAL CA: CPT | Performed by: INTERNAL MEDICINE

## 2024-06-06 PROCEDURE — 82948 REAGENT STRIP/BLOOD GLUCOSE: CPT

## 2024-06-06 PROCEDURE — 97110 THERAPEUTIC EXERCISES: CPT

## 2024-06-06 PROCEDURE — 99239 HOSP IP/OBS DSCHRG MGMT >30: CPT | Performed by: INTERNAL MEDICINE

## 2024-06-06 PROCEDURE — 97163 PT EVAL HIGH COMPLEX 45 MIN: CPT

## 2024-06-06 PROCEDURE — 85027 COMPLETE CBC AUTOMATED: CPT | Performed by: INTERNAL MEDICINE

## 2024-06-06 RX ADMIN — ALLOPURINOL 100 MG: 100 TABLET ORAL at 09:03

## 2024-06-06 RX ADMIN — INSULIN LISPRO 1 UNITS: 100 INJECTION, SOLUTION INTRAVENOUS; SUBCUTANEOUS at 12:16

## 2024-06-06 RX ADMIN — FERROUS SULFATE TAB 325 MG (65 MG ELEMENTAL FE) 325 MG: 325 (65 FE) TAB at 09:03

## 2024-06-06 RX ADMIN — OXYCODONE HYDROCHLORIDE AND ACETAMINOPHEN 500 MG: 500 TABLET ORAL at 09:03

## 2024-06-06 RX ADMIN — Medication 2000 UNITS: at 09:03

## 2024-06-06 RX ADMIN — TORSEMIDE 40 MG: 20 TABLET ORAL at 09:02

## 2024-06-06 RX ADMIN — POLYETHYLENE GLYCOL 3350 17 G: 17 POWDER, FOR SOLUTION ORAL at 09:02

## 2024-06-06 RX ADMIN — DOCUSATE SODIUM 100 MG: 100 CAPSULE, LIQUID FILLED ORAL at 09:02

## 2024-06-06 RX ADMIN — APIXABAN 2.5 MG: 2.5 TABLET, FILM COATED ORAL at 09:03

## 2024-06-06 NOTE — DISCHARGE INSTR - AVS FIRST PAGE
Follow ups:  -PCP    Medication changes  -Stopped Lantus 5U at bedtime due to low AM blood glucose  -Can continue sliding scale insulin and glimepiride with regular blood sugar checks    Other instructions:  -[facility] services already set up at home

## 2024-06-06 NOTE — CASE MANAGEMENT
Case Management Discharge Planning Note    Patient name Yao Tipton  Location [address] [address]/[address] [address]-* MRN [id number]  : 1940 Date 2024       Current Admission Date: 2024  Current Admission Diagnosis:Unable to care for self   Patient Active Problem List    Diagnosis Date Noted Date Diagnosed    Unable to care for self 2024     Gout 2024     Left ankle pain 2023     Chronic combined systolic and diastolic CHF (congestive heart failure) ([facility]) 2023     Dyslipidemia 2023     BPH (benign prostatic hyperplasia) 2023     Cellulitis of left lower extremity 2023     Constipation 08/10/2023     Pleural effusion exudative 2023     Goals of care, counseling/discussion 2023     Duodenal ulcer 07/10/2023     Encephalopathy 2023     Recent empyema of lung with persistent locuted R hydropneumothorax 2023     Hypoglycemia 2023     Thrombocytopenia ([facility]) 2023     Diarrhea 2023     Hypothermia 2023     Septic shock ([facility]) 2023     Urinary retention 2023     Macrocytosis 2023     Chronic anemia 2023     Chronic kidney disease-mineral and bone disorder 2023     Advanced care planning/counseling discussion 2023     Benign hypertension with CKD (chronic kidney disease) stage IV ([facility]) 2022     Persistent proteinuria 2022     COVID-19 2022     Electrolyte abnormality 2022     Cellulitis of left upper extremity 2021     Chronic atrial fibrillation ([facility]) 2021     Vitamin D deficiency 10/18/2019     Chronic kidney disease, stage 4 (severe) ([facility]) 2019     Acute on chronic combined systolic and diastolic congestive heart failure ([facility]) 2019     Recent pericardial effusion 2019     Leg edema 01/10/2019     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 ([facility]) 01/10/2019     PVC (premature ventricular contraction)  12/19/2018     Essential hypertension 12/19/2018     Closed traumatic displaced fracture of distal end of left radius with malunion 02/09/2018       LOS (days): 2  Geometric Mean LOS (GMLOS) (days): 3.9  Days to GMLOS:2     OBJECTIVE:  Risk of Unplanned Readmission Score: 37.75         Current admission status: Inpatient   Preferred Pharmacy:   [id number] [place] [place] [facility] [address]  [facility]  [address] [place] [id number]  Phone: [phone] Fax: [phone]    Primary Care Provider: Nicho Baltazar DO    Primary Insurance: MEDICARE  Secondary Insurance: BLUE CROSS    DISCHARGE DETAILS:    Discharge planning discussed with:: Patient  Freedom of Choice: Yes  Comments - Freedom of Choice: Choice is for D/C home w/ [facility] with [facility].  CM contacted family/caregiver?: Yes  Were Treatment Team discharge recommendations reviewed with patient/caregiver?: Yes  Did patient/caregiver verbalize understanding of patient care needs?: Yes  Were patient/caregiver advised of the risks associated with not following Treatment Team discharge recommendations?: Yes    Contacts  Patient Contacts: González Tipton  Relationship to Patient:: Family  Contact Method: Phone  Phone Number: [phone]  Reason/Outcome: Emergency Contact, Discharge Planning    Requested Home Health Care         Is the patient interested in HHC at discharge?: Yes (refusing)  Home Health Discipline requested:: Nursing, Physical Therapy, Occupational Therapy  [facility]:: [facility]  [facility] External Referral Reason (only applicable if external HHA name selected): Patient has established relationship with provider  Home Health Follow-Up Provider:: PCP  Home Health Services Needed:: Diabetes Management, Evaluate Functional Status and Safety, Gait/ADL Training, Heart Failure Management, Strengthening/Theraputic Exercises to Improve Function  Homebound Criteria Met:: Uses an Assist Device (i.e. cane, walker, etc), Requires the  Assistance of Another Person for Safe Ambulation or to Leave the Home  Supporting Clincal Findings:: Limited Endurance, Fatigues Easliy in Short Distances    DME Referral Provided  Referral made for DME?: No    Other Referral/Resources/Interventions Provided:  Interventions: C  Referral Comments: [facility] referral sent to [facility] via [facility].    Would you like to participate in our [facility] service program?  : No - Declined    Treatment Team Recommendation: Home with Home Health Care  Discharge Destination Plan:: Home with Home Health Care  Transport at Discharge : Family  ETA of Transport (Date): 06/06/24     Additional Comments: [facility] confirmed with [facility] via [facility] referral.

## 2024-06-06 NOTE — PLAN OF CARE
Problem: Potential for Falls  Goal: Patient will remain free of falls  Description: INTERVENTIONS:  - Educate patient/family on patient safety including physical limitations  - Instruct patient to call for assistance with activity   - Consult OT/PT to assist with strengthening/mobility   - Keep Call bell within reach  - Keep bed low and locked with side rails adjusted as appropriate  - Keep care items and personal belongings within reach  - Initiate and maintain comfort rounds  - Make Fall Risk Sign visible to staff  - Offer Toileting every 2 Hours, in advance of need  - Initiate/Maintain bed alarm  - Obtain necessary fall risk management equipment: nonskid socks, bed alarm in use, call bell within reach  Problem: SAFETY ADULT  Goal: Patient will remain free of falls  Description: INTERVENTIONS:  - Educate patient/family on patient safety including physical limitations  - Instruct patient to call for assistance with activity   - Consult OT/PT to assist with strengthening/mobility   - Keep Call bell within reach  - Keep bed low and locked with side rails adjusted as appropriate  - Keep care items and personal belongings within reach  - Initiate and maintain comfort rounds  - Make Fall Risk Sign visible to staff

## 2024-06-07 NOTE — PHYSICAL THERAPY NOTE
PHYSICAL THERAPY EVALUATION/TREATMENT     06/06/24 1151   PT Last Visit   PT Visit Date 06/06/24   Note Type   Note type Evaluation   Pain Assessment   Pain Assessment Tool Vergara-Baker FACES   Vergara-Baker FACES Pain Rating 0   Restrictions/Precautions   Other Precautions Cognitive;Chair Alarm;Bed Alarm;Fall Risk   Home Living   Type of Home House   Home Layout Two level;Able to live on main level with bedroom/bathroom;Stairs to enter with rails  (2 stairs to enter)   Home Equipment Walker   Additional Comments Patient utilizing a roller walker prior to admission as per past documentation.  Patient is a poor historian at this time   Prior Function   Level of [place] Needs assistance with ADLs;Independent with functional mobility;Needs assistance with IADLS   Lives With Son   Receives Help From Family   IADLs Family/Friend/Other provides transportation;Family/Friend/Other provides meals;Family/Friend/Other provides medication management   Comments Patient is a poor historian with confusion although pleasant and cooperative unable to offer reliable information   General   Additional Pertinent History Chart reviewed.  Patient admitted with generalized weakness, gait dysfunction and pleural effusion.  Patient unable to care for himself at home as per past documentation   Family/Caregiver Present No   Cognition   Overall Cognitive Status Impaired   Arousal/Participation Cooperative   Orientation Level Oriented to person;Oriented to place   Following Commands Follows one step commands with increased time or repetition   Subjective   Subjective I am going home today   RLE Assessment   RLE Assessment   (Range of motion within functional limits, strength 3+/5)   LLE Assessment   LLE Assessment   (Range of motion within functional limits, strength 3+/5)   Coordination   Movements are Fluid and Coordinated 0   Coordination and Movement Description Decreased coordination with transfers and gait activity   Bed Mobility    Supine to Sit 4  Minimal assistance   Additional items Assist x 1;Verbal cues;LE management   Sit to Supine 5  Supervision   Transfers   Sit to Stand 4  Minimal assistance   Additional items Assist x 1   Stand to Sit 4  Minimal assistance   Additional items Assist x 1   Ambulation/Elevation   Gait Assistance 3  Moderate assist   Additional items Assist x 1;Verbal cues;Tactile cues   Assistive Device   (Handhold assist)   Distance 15 feet with change in direction and posterior balance loss at times.  Patient with shortened stride length and narrow base of support   Balance   Static Sitting Fair   Dynamic Sitting Fair -   Static Standing Fair -   Dynamic Standing Poor +   Ambulatory Poor +   Activity Tolerance   Activity Tolerance Patient limited by fatigue;Other (Comment)  (Limited by weakness, limited by cognitive deficits)   Nurse Made Aware Yes   Assessment   Prognosis Good   Problem List Decreased strength;Decreased range of motion;Decreased endurance;Impaired balance;Decreased mobility;Decreased coordination;Decreased cognition;Impaired judgement;Decreased safety awareness   Assessment Patient seen for Physical Therapy evaluation. Patient admitted with Unable to care for self.  Comorbidities affecting patient's physical performance include: .  Personal factors affecting patient at time of initial evaluation include: ambulating with assistive device, inability to ambulate household distances, inability to navigate community distances, inability to navigate level surfaces without external assistance, inability to perform dynamic tasks in community, limited home support, inability to perform physical activity, limited insight into impairments, inability to perform ADLS, inability to perform IADLS , and inability to live alone. Prior to admission, patient was requiring assist for functional mobility with walker, requiring assist for ADLS, requiring assist for IADLS, ambulating household distance, and home with  family assist.  Please find objective findings from Physical Therapy assessment regarding body systems outlined above with impairments and limitations including weakness, decreased ROM, impaired balance, decreased endurance, impaired coordination, gait deviations, decreased activity tolerance, decreased functional mobility tolerance, fall risk, SOB upon exertion, and decreased cognition.  The Barthel Index was used as a functional outcome tool presenting with a score of Barthel Index Score: 50 today indicating marked limitations of functional mobility and ADLS.  Patient's clinical presentation is currently unstable/unpredictable as seen in patient's presentation of vital sign response, changing level of pain, varying levels of cognitive performance, increased fall risk, new onset of impairment of functional mobility, decreased endurance, and new onset of weakness. Pt would benefit from continued Physical Therapy treatment to address deficits as defined above and maximize level of functional mobility. As demonstrated by objective findings, the assigned level of complexity for this evaluation is high.The patient's -[facility] Basic Mobility Inpatient Short Form Raw Score is 15. A Raw score of less than or equal to 16 suggests the patient may benefit from discharge to post-acute rehabilitation services. Please also refer to the recommendation of the Physical Therapist for safe discharge planning.   Goals   Patient Goals To go home with his son today   STG Expiration Date 06/13/24   Short Term Goal #1 Transfers and gait with roller walker with supervision   Short Term Goal #2 Gait endurance to 40 feet   LTG Expiration Date 06/20/24   Long Term Goal #1 Strength bilateral lower extremities 4/5   Long Term Goal #2 Independent transfers and gait with roller walker for functional household distances   Plan   Treatment/Interventions ADL retraining;Functional transfer training;LE strengthening/ROM;Therapeutic exercise;Endurance  training;Patient/family training;Equipment eval/education;Cognitive reorientation;Bed mobility;Gait training;Compensatory technique education   Discharge Recommendation   Rehab Resource Intensity Level, PT III (Minimum Resource Intensity)   Additional Comments patient with dementia, family assist at home   AM-PAC Basic Mobility Inpatient   Turning in Flat Bed Without Bedrails 3   Lying on Back to Sitting on Edge of Flat Bed Without Bedrails 3   Moving Bed to Chair 2   Standing Up From Chair Using Arms 3   Walk in Room 2   Climb 3-5 Stairs With Railing 2   Basic Mobility Inpatient Raw Score 15   Basic Mobility Standardized Score 36.97   [facility] Highest Level Of Mobility   -[facility] Goal 4: Move to chair/commode   -HL Achieved 6: Walk 10 steps or more   Barthel Index   Feeding 5   Bathing 0   Grooming Score 0   Dressing Score 5   Bladder Score 10   Bowels Score 10   Toilet Use Score 5   Transfers (Bed/Chair) Score 10   Mobility (Level Surface) Score 0   Stairs Score 5   Barthel Index Score 50   Additional Treatment Session   Start Time 1136   End Time 1151   Treatment Assessment s: Patient states no pain O: Bilateral lower extremity exercise completed as listed below A: Patient pleasant and cooperative although presents with generalized weakness and resulting gait dysfunction.  Patient will benefit from continued physical therapy with progression as tolerated and increasing functional mobility with clinical staff as well   Exercises   Heelslides Supine;5 reps;Bilateral   Hip Flexion Sitting;10 reps  (Alternating)   Hip Abduction Sitting;10 reps  (Alternating)   Knee AROM Long Arc Quad Sitting;10 reps  (Alternating)   Balance training  Sidestepping completed for balance and coordination   Licensure   [place] License Number  Celine Renea PT [id number]

## 2024-07-08 ENCOUNTER — OFFICE VISIT (OUTPATIENT)
Dept: AUDIOLOGY | Facility: CLINIC | Age: 84
End: 2024-07-08

## 2024-07-08 DIAGNOSIS — H90.3 SENSORINEURAL HEARING LOSS (SNHL), BILATERAL: Primary | ICD-10-CM

## 2024-07-08 NOTE — PROGRESS NOTES
"Hearing Aid Visit:    Name:  Yao Tipton  :  1940  Age:  83 y.o.  MRN:  [id number]  Date of Evaluation: 24     HISTORY:    Yao Tipton was seen today (2024) for a(n) in-warranty hearing aid check of his bilateral hearing aids. Today, Yao was accompanied by his son González.   Currently, Yao is living with his son González.   He was recently hospitalized and his son reported that he has been feeling \"better\".       Device Information    Hearing Aid Fitting Date: 3/6/2024       Left Device Right Device   Hearing Aid Make: Oticon Oticon   Hearing Aid Model: Real 2 miniRITE R Real 2 miniRITE R   Serial Number: [id number]   Repair Warranty Expiration Date: 10/11/26 10/11/26   Loss/Damage Warranty Expiration Date:  10/11/26 10/11/26    Length: 2 2    Output: 85 no lock 85 no lock    Wax System: Pro Wax miniFIT Pro Wax miniFIT   Dome Size/Style: 8mm double bass 8mm double bass   Battery: Lithium-ion Rechargeable Lithium-ion Rechargeable   [employer]old Serial Number: N/A N/A   [employer]old Warranty Date:  N/A N/A      Serial Number: [id number]  Accessories: N/A    ACTION/ADJUSTMENTS:  Cleaned and checked both devices.   Changed domes and wax guards and provided re-instruction to son for changing the wax guards monthly.    Provided small brush and instructed on brushing the mics.  The ones closer to his head had debris   Left canal is clear, the right canal has wax, far back near TM, that is obscuring most of the TM.   Discussed and provided information for ENT.     Firmware was updated to 1.1.1     RECOMMENDATIONS:   RTO 2024 for annual audiogram and 5 month HA service.    Patient is scheduled for 24 for cerumen removal Right     Vicente Samayoa, CCC-A  Clinical Audiologist  [place] [id number]  [facility] AUDIOLOGY  [address] [facility] [id number]  "

## 2024-07-10 ENCOUNTER — OFFICE VISIT (OUTPATIENT)
Dept: OTOLARYNGOLOGY | Facility: CLINIC | Age: 84
End: 2024-07-10
Payer: MEDICARE

## 2024-07-10 VITALS — WEIGHT: 150 LBS | BODY MASS INDEX: 23.54 KG/M2 | HEIGHT: 67 IN

## 2024-07-10 DIAGNOSIS — H61.21 RIGHT EAR IMPACTED CERUMEN: Primary | ICD-10-CM

## 2024-07-10 DIAGNOSIS — H90.3 SENSORINEURAL HEARING LOSS (SNHL), BILATERAL: ICD-10-CM

## 2024-07-10 PROCEDURE — 99203 OFFICE O/P NEW LOW 30 MIN: CPT | Performed by: NURSE PRACTITIONER

## 2024-07-10 PROCEDURE — 69210 REMOVE IMPACTED EAR WAX UNI: CPT | Performed by: NURSE PRACTITIONER

## 2024-07-10 RX ORDER — TORSEMIDE 20 MG/1
TABLET ORAL
COMMUNITY
Start: 2024-07-05

## 2024-07-10 RX ORDER — INSULIN ASPART 100 [IU]/ML
INJECTION, SOLUTION INTRAVENOUS; SUBCUTANEOUS
COMMUNITY
Start: 2024-05-20

## 2024-07-10 RX ORDER — CEPHALEXIN 250 MG/1
CAPSULE ORAL
COMMUNITY
Start: 2024-05-28

## 2024-07-10 RX ORDER — PEN NEEDLE, DIABETIC 32GX 5/32"
NEEDLE, DISPOSABLE MISCELLANEOUS
COMMUNITY
Start: 2024-05-30

## 2024-07-10 NOTE — PROGRESS NOTES
Assessment/Plan:      Diagnoses and all orders for this visit:    Right ear impacted cerumen  -     Ear cerumen removal    Sensorineural hearing loss (SNHL), bilateral    Other orders  -     torsemide (DEMADEX) 20 mg tablet  -     Sure Comfort Pen Needles 32G X 4 MM MISC  -     insulin aspart (NovoLOG FlexPen) 100 UNIT/ML injection pen  -     cephalexin (KEFLEX) 250 mg capsule            On exam noted right cerumen impaction and unable to fully view tympanic membrane.  Cerumen impaction removed right eac with alligator forceps and suction, pt tolerated procedure well.  Upon removal, improved hearing and decreased clogged sensation of bilateral ears.  Discussed routine cerumen care including avoidance of q-tips, may use cerumen softeners every one to two months.  Hydrocortisone cream pea sized amount on finger as needed for itching in ears.  Encourage ongoing follow up prn to monitor for cerumen and hearing.  Audiogram if symptoms worsen. Continue binaural hearing aids         Subjective:     Patient ID: Yao Tipton is a 83 y.o. male.    Presents today as a new patient due to ear concerns.  Hearing gradually worsening.  Bilateral ears feel blocked.  No tinnitus.  No otalgia or otorrhea.  No history of ear surgery.  No current hearing aids.    Here today with son          Review of Systems   Constitutional: Negative.    HENT:  Negative for congestion, ear discharge, ear pain, hearing loss, nosebleeds, postnasal drip, rhinorrhea, sinus pressure, sinus pain, sore throat, tinnitus and voice change.    Respiratory:  Negative for chest tightness and shortness of breath.    Skin:  Negative for color change.   Neurological:  Negative for dizziness, numbness and headaches.   Psychiatric/Behavioral: Negative.           Objective:     Physical Exam  Constitutional:       Appearance: He is well-developed.   HENT:      Head: Normocephalic.      Right Ear: Hearing, tympanic membrane, ear canal and external ear normal. No  decreased hearing noted. No drainage or tenderness. There is impacted cerumen. Tympanic membrane is not perforated or erythematous.      Left Ear: Hearing, tympanic membrane, ear canal and external ear normal. No decreased hearing noted. No drainage or tenderness. Tympanic membrane is not perforated or erythematous.      Nose: Nose normal. No nasal deformity or septal deviation.      Mouth/Throat:      Mouth: Mucous membranes are not pale and not dry. No oral lesions.      Dentition: Normal dentition.      Pharynx: Uvula midline. No oropharyngeal exudate.   Neck:      Trachea: No tracheal deviation.   Pulmonary:      Effort: No accessory muscle usage or respiratory distress.   Musculoskeletal:      Cervical back: Neck supple.   Lymphadenopathy:      Cervical: No cervical adenopathy.   Skin:     General: Skin is warm and dry.   Neurological:      Mental Status: He is alert and oriented to person, place, and time.      Cranial Nerves: No cranial nerve deficit.      Sensory: No sensory deficit.      Comments: Walker in use     Psychiatric:         Behavior: Behavior is cooperative.         Ear cerumen removal    Date/Time: 7/10/2024 3:00 PM    Performed by: SUSANA Pagan  Authorized by: SUSANA Pagan  Universal Protocol:  Consent: Verbal consent obtained.  Risks and benefits: risks, benefits and alternatives were discussed  Consent given by: patient  Patient understanding: patient states understanding of the procedure being performed    Patient location:  Clinic  Procedure details:     Local anesthetic:  None    Location:  R ear    Approach:  External  Post-procedure details:     Complication:  None    Hearing quality:  Normal    Patient tolerance of procedure:  Tolerated well, no immediate complications

## 2024-08-08 ENCOUNTER — OFFICE VISIT (OUTPATIENT)
Dept: CARDIOLOGY CLINIC | Facility: CLINIC | Age: 84
End: 2024-08-08
Payer: MEDICARE

## 2024-08-08 VITALS
SYSTOLIC BLOOD PRESSURE: 124 MMHG | DIASTOLIC BLOOD PRESSURE: 84 MMHG | BODY MASS INDEX: 23.07 KG/M2 | WEIGHT: 147 LBS | HEART RATE: 90 BPM | OXYGEN SATURATION: 97 % | HEIGHT: 67 IN

## 2024-08-08 DIAGNOSIS — E11.51 DIABETES MELLITUS WITH PERIPHERAL ARTERY DISEASE (HCC): ICD-10-CM

## 2024-08-08 DIAGNOSIS — I10 ESSENTIAL HYPERTENSION: Chronic | ICD-10-CM

## 2024-08-08 DIAGNOSIS — I50.42 CHRONIC COMBINED SYSTOLIC AND DIASTOLIC CHF (CONGESTIVE HEART FAILURE) (HCC): ICD-10-CM

## 2024-08-08 DIAGNOSIS — I31.39 PERICARDIAL EFFUSION: Chronic | ICD-10-CM

## 2024-08-08 DIAGNOSIS — I48.0 PAROXYSMAL ATRIAL FIBRILLATION (HCC): ICD-10-CM

## 2024-08-08 DIAGNOSIS — I49.3 PVC (PREMATURE VENTRICULAR CONTRACTION): ICD-10-CM

## 2024-08-08 DIAGNOSIS — N18.4 BENIGN HYPERTENSION WITH CKD (CHRONIC KIDNEY DISEASE) STAGE IV (HCC): ICD-10-CM

## 2024-08-08 DIAGNOSIS — I48.20 CHRONIC ATRIAL FIBRILLATION (HCC): Primary | ICD-10-CM

## 2024-08-08 DIAGNOSIS — I12.9 BENIGN HYPERTENSION WITH CKD (CHRONIC KIDNEY DISEASE) STAGE IV (HCC): ICD-10-CM

## 2024-08-08 DIAGNOSIS — E78.5 DYSLIPIDEMIA: ICD-10-CM

## 2024-08-08 PROCEDURE — 93000 ELECTROCARDIOGRAM COMPLETE: CPT | Performed by: NURSE PRACTITIONER

## 2024-08-08 PROCEDURE — 99214 OFFICE O/P EST MOD 30 MIN: CPT | Performed by: NURSE PRACTITIONER

## 2024-08-08 NOTE — PROGRESS NOTES
Progress Note - Cardiology Office  [facility]    Yao Tipton 83 y.o. male MRN: [id number]  : 1940  Encounter: [id number]      Assessment:     1. Chronic atrial fibrillation (HCC)    2. PVC (premature ventricular contraction)    3. Essential hypertension    4. Recent pericardial effusion    5. Dyslipidemia    6. Chronic combined systolic and diastolic CHF (congestive heart failure) ([facility])    7. Benign hypertension with CKD (chronic kidney disease) stage IV ([facility])        Discussion Summary and Plan:  1. Chronic atrial fibrillation: rate controlled    -   not on AV donnie blocking medication    -   [id number] score = five or 7.2% risk of stroke per year.  Continue  Eliquis 2.5 mg bid    2. Hypertension: blood pressures currently stable.    -   Currently not on any antihypertensives    -   continue to monitor    3. Dyslipidemia: continue low cholesterol diet    4. Chronic combined systolic and diastolic heart failure: 2023 TTE: EF visibly estimated 45% with mild global hypo kinesis and grade 1 diastolic dysfunction, left atrium was severely dilated and right atrium was mildly dilated, there was mild mitral and tricuspid valve regurgitation With peak PA pressures of 40 or 45 mmHg. Patient did have a large pericardial effusion which he was transferred to the [facility]    -   patient had pericardial send thesis at subsequent echoes demonstrate resolution of effusion    -   continue torsemide 20 mg daily    -   low sodium diet    -   continue daily weights    5. Empyema: resolved    6. Pericardial effusion:  2023 TTE: EF visibly estimated 45% with mild global hypo kinesis and grade 1 diastolic dysfunction, left atrium was severely dilated and right atrium was mildly dilated, there was mild mitral and tricuspid valve regurgitation With peak PA pressures of 40 or 45 mmHg. Patient did have a large pericardial effusion which he was transferred to the [facility]    -    patient had pericardial send thesis at subsequent echoes demonstrate resolution of effusion      7. Chronic kidney disease stage III      Patient / Caretaker was advised and educated to call our office  immediately if  patient has any new symptoms of chest pain/shortness of breath, near-syncope, syncope, light headedness sustained palpitations  or any other cardiovascular symptoms before their scheduled follow-up appointment.  Office number was provided #[phone].    Please call [phone] if any questions.    Counseling :  A description of the counseling.  Goals and Barriers.  Patient's ability to self care: Yes  Medication side effect reviewed with patient in detail and all their questions answered to their satisfaction.    HPI :     Yao Tipton is a 83 y.o. year old male who came for follow up. Patient has not been seen in our office for over one year. He is had multiple prolonged illnesses with hospitalization and long stays at local subacute rehab. June 2023, patient initially presented to [facility] for complaints of generalized fatigue, diarrhea and malaise. He was found to have an emphysema and large pericardial effusion for which she was subsequently transferred to the [facility]. He had a prolonged hospitalization there for treatment of both problems and then was transferred to a subacute rehab. He is now home and doing fairly well. He is seen with his son who is his caregiver and POA.    Patient denies any shortness of breath, chest pain or pressure. His activity is fairly limited but he is able to walk fair distances such as from the parking lot into the office without any difficulty. His weights have been stable and his lower extremity edema has improved.    Review of Systems   Constitutional: Negative.  Negative for activity change and fatigue.   HENT:  Negative for congestion, facial swelling, sinus pain and trouble swallowing.    Eyes: Negative.  Negative for photophobia and visual  disturbance.   Respiratory: Negative.  Negative for choking, chest tightness and shortness of breath.    Cardiovascular:  Positive for leg swelling. Negative for chest pain and palpitations.   Gastrointestinal:  Negative for abdominal distention and constipation.   Endocrine: Negative.  Negative for polydipsia, polyphagia and polyuria.   Genitourinary: Negative.  Negative for difficulty urinating.   Musculoskeletal: Negative.    Skin: Negative.    Neurological: Negative.  Negative for dizziness, syncope, weakness and light-headedness.   Hematological: Negative.    Psychiatric/Behavioral: Negative.         Historical Information   Past Medical History:   Diagnosis Date    Atrial fibrillation (HCC)     Chronic kidney disease     Coronary artery disease     Diabetes mellitus (HCC)     Hyperlipidemia     Hypertension      Past Surgical History:   Procedure Laterality Date    CARDIAC CATHETERIZATION N/A 6/30/2023    Procedure: Cardiac pericardiocentesis;  Surgeon: Shanna Adame DO;  [facility]: [facility];  Service: Cardiology    CARDIAC CATHETERIZATION N/A 6/30/2023    Procedure: Cardiac RHC;  Surgeon: Shanna Adame DO;  Location: [facility];  Service: Cardiology    EYE SURGERY      IR CHEST TUBE PLACEMENT  6/24/2023    IR CHEST TUBE PLACEMENT  7/28/2023    IR PLEURAL EFFUSION LONG-TERM CATHETER PLACEMENT  8/7/2023    IR PLEURAL EFFUSION LONG-TERM CATHETER REMOVAL  1/3/2024    IR THORACENTESIS  12/11/2023    SKIN CANCER EXCISION      TONSILLECTOMY       Social History     Substance and Sexual Activity   Alcohol Use Not Currently    Alcohol/week: 0.0 standard drinks of alcohol    Comment: special occasions     Social History     Substance and Sexual Activity   Drug Use Not Currently     Social History     Tobacco Use   Smoking Status Never   Smokeless Tobacco Former   Tobacco Comments    Smoked a pipe 50 years ago     Family History:   Family History   Problem Relation Age of Onset    Heart  disease Mother     Diabetes Father     Vision loss Father     Cancer Sister     Diabetes Sister        Meds/Allergies     Allergies   Allergen Reactions    Elemental Sulfur     Sulfa Antibiotics        Current Outpatient Medications:     allopurinol (ZYLOPRIM) 100 mg tablet, Take 100 mg by mouth 2 (two) times a day, Disp: , Rfl:     ALPRAZolam (XANAX) 0.5 mg tablet, 0.5 mg daily at bedtime as needed for anxiety or sleep, Disp: , Rfl: 0    ascorbic acid (VITAMIN C) 500 MG tablet, Take 1 tablet (500 mg total) by mouth daily, Disp: , Rfl: 0    atorvastatin (LIPITOR) 40 mg tablet, Take 1 tablet (40 mg total) by mouth daily with dinner, Disp: 30 tablet, Rfl: 0    Blood Pressure Monitor NILTON, by Does not apply route daily, Disp: 1 Device, Rfl: 0    cephalexin (KEFLEX) 250 mg capsule, , Disp: , Rfl:     docusate sodium (COLACE) 100 mg capsule, Take 1 capsule (100 mg total) by mouth 2 (two) times a day as needed for constipation, Disp: , Rfl: 0    Eliquis 2.5 MG, TAKE ONE TABLET BY MOUTH TWICE A DAY, Disp: 60 tablet, Rfl: 11    ferrous sulfate 325 (65 Fe) mg tablet, Take 325 mg by mouth daily with breakfast, Disp: , Rfl:     glimepiride (AMARYL) 2 mg tablet, Take 1 tablet (2 mg total) by mouth daily with dinner, Disp: 30 tablet, Rfl: 0    halobetasol (ULTRAVATE) 0.05 % cream, , Disp: , Rfl:     insulin aspart (NovoLOG FlexPen) 100 UNIT/ML injection pen, , Disp: , Rfl:     insulin lispro (HumaLOG) 100 units/mL injection, Inject 1-5 Units under the skin 3 (three) times a day before meals, Disp: , Rfl: 0    insulin lispro (HumaLOG) 100 units/mL injection, Inject 1-5 Units under the skin daily at bedtime, Disp: , Rfl: 0    latanoprost (XALATAN) 0.005 % ophthalmic solution, 1 drop daily at bedtime, Disp: , Rfl:     Semglee, yfgn, 100 UNIT/ML SOPN, , Disp: , Rfl:     Sure Comfort Pen Needles 32G X 4 MM MISC, , Disp: , Rfl:     tamsulosin (FLOMAX) 0.4 mg, Take 0.4 mg by mouth daily with dinner, Disp: , Rfl:     timolol (TIMOPTIC)  "0.5 % ophthalmic solution, Administer 1 drop to both eyes daily, Disp: , Rfl:     torsemide (DEMADEX) 20 mg tablet, , Disp: , Rfl:     ZINC OXIDE PO, Take by mouth daily, Disp: , Rfl:     cholecalciferol (VITAMIN D3) 1,000 units tablet, Take 2 tablets (2,000 Units total) by mouth daily Do not start before July 12, 2023., Disp: 60 tablet, Rfl: 0    glimepiride (AMARYL) 4 mg tablet, Take 1 tablet (4 mg total) by mouth daily with breakfast (Patient not taking: Reported on 8/8/2024), Disp: , Rfl: 0    Vitals: Blood pressure 124/84, pulse 90, height 5' 7\" (1.702 m), weight 66.7 kg (147 lb), SpO2 97%.    Body mass index is 23.02 kg/m².  Wt Readings from Last 3 Encounters:   08/08/24 66.7 kg (147 lb)   07/10/24 68 kg (150 lb)   06/04/24 68.4 kg (150 lb 14.4 oz)     Vitals:    08/08/24 1412   Weight: 66.7 kg (147 lb)     BP Readings from Last 3 Encounters:   08/08/24 124/84   06/06/24 136/64   04/29/24 124/70       Physical Exam:  Physical Exam  Vitals and nursing note reviewed.   Constitutional:       Appearance: Normal appearance. He is normal weight. He is ill-appearing (Chronically).   HENT:      Right Ear: External ear normal.      Left Ear: External ear normal.   Eyes:      General: No scleral icterus.        Right eye: No discharge.         Left eye: No discharge.   Cardiovascular:      Rate and Rhythm: Normal rate. Rhythm irregular.      Pulses: Normal pulses.      Heart sounds: Murmur heard.   Pulmonary:      Effort: Pulmonary effort is normal.      Breath sounds: Examination of the right-lower field reveals decreased breath sounds. Examination of the left-lower field reveals decreased breath sounds. Decreased breath sounds present.   Abdominal:      General: Bowel sounds are normal. There is no distension.      Palpations: Abdomen is soft.   Musculoskeletal:      Right lower leg: Edema present.      Left lower leg: Edema present.      Comments: Component of lymphedema with venous stasis changes   Skin:     " "General: Skin is warm and dry.      Capillary Refill: Capillary refill takes less than 2 seconds.   Neurological:      General: No focal deficit present.      Mental Status: He is alert. Mental status is at baseline.   Psychiatric:         Mood and Affect: Mood normal.             Diagnostic Studies Review Cardio:  EKG:  atrial fibrillation with control ventricular response      Bonita MEZA  Cardiology        \"This note was completed in part utilizing [employer] direct voice recognition software.   Grammatical errors, random word insertion, spelling mistakes, and incomplete sentences may be an occasional consequence of the system secondary to software limitations, ambient noise and hardware issues.    Please read the chart carefully and recognize, using context, where substitutions have occurred.  If you have any questions or concerns about the context, text or information contained within the body of this dictation, please contact myself, the provider, for further clarification.\"  "

## 2024-08-19 ENCOUNTER — HOSPITAL ENCOUNTER (INPATIENT)
Facility: HOSPITAL | Age: 84
LOS: 4 days | Discharge: NURSING, REHAB OR LONG-TERM CARE | DRG: 951 | End: 2024-08-24
Attending: EMERGENCY MEDICINE
Payer: MEDICARE

## 2024-08-19 DIAGNOSIS — R53.1 GENERALIZED WEAKNESS: Primary | ICD-10-CM

## 2024-08-19 DIAGNOSIS — Z78.9 UNABLE TO CARE FOR SELF: ICD-10-CM

## 2024-08-19 DIAGNOSIS — N18.9 CKD (CHRONIC KIDNEY DISEASE): ICD-10-CM

## 2024-08-19 LAB
2HR DELTA HS TROPONIN: 2 NG/L
ALBUMIN SERPL BCG-MCNC: 3.5 G/DL (ref 3.5–5)
ALP SERPL-CCNC: 146 U/L (ref 34–104)
ALT SERPL W P-5'-P-CCNC: 15 U/L (ref 7–52)
ANION GAP SERPL CALCULATED.3IONS-SCNC: 5 MMOL/L (ref 4–13)
AST SERPL W P-5'-P-CCNC: 16 U/L (ref 13–39)
BASOPHILS # BLD AUTO: 0.02 THOUSANDS/ÂΜL (ref 0–0.1)
BASOPHILS NFR BLD AUTO: 0 % (ref 0–1)
BILIRUB SERPL-MCNC: 0.84 MG/DL (ref 0.2–1)
BNP SERPL-MCNC: 257 PG/ML (ref 0–100)
BUN SERPL-MCNC: 51 MG/DL (ref 5–25)
CALCIUM SERPL-MCNC: 8.7 MG/DL (ref 8.4–10.2)
CARDIAC TROPONIN I PNL SERPL HS: 15 NG/L
CARDIAC TROPONIN I PNL SERPL HS: 17 NG/L
CHLORIDE SERPL-SCNC: 102 MMOL/L (ref 96–108)
CO2 SERPL-SCNC: 27 MMOL/L (ref 21–32)
CREAT SERPL-MCNC: 1.87 MG/DL (ref 0.6–1.3)
EOSINOPHIL # BLD AUTO: 0.09 THOUSAND/ÂΜL (ref 0–0.61)
EOSINOPHIL NFR BLD AUTO: 2 % (ref 0–6)
ERYTHROCYTE [DISTWIDTH] IN BLOOD BY AUTOMATED COUNT: 17.1 % (ref 11.6–15.1)
FLUAV RNA RESP QL NAA+PROBE: NEGATIVE
FLUBV RNA RESP QL NAA+PROBE: NEGATIVE
GFR SERPL CREATININE-BSD FRML MDRD: 32 ML/MIN/1.73SQ M
GLUCOSE SERPL-MCNC: 156 MG/DL (ref 65–140)
GLUCOSE SERPL-MCNC: 164 MG/DL (ref 65–140)
HCT VFR BLD AUTO: 37.7 % (ref 36.5–49.3)
HGB BLD-MCNC: 12.1 G/DL (ref 12–17)
IMM GRANULOCYTES # BLD AUTO: 0.01 THOUSAND/UL (ref 0–0.2)
IMM GRANULOCYTES NFR BLD AUTO: 0 % (ref 0–2)
LACTATE SERPL-SCNC: 1.1 MMOL/L (ref 0.5–2)
LIPASE SERPL-CCNC: 16 U/L (ref 11–82)
LYMPHOCYTES # BLD AUTO: 1.47 THOUSANDS/ÂΜL (ref 0.6–4.47)
LYMPHOCYTES NFR BLD AUTO: 25 % (ref 14–44)
MAGNESIUM SERPL-MCNC: 2.2 MG/DL (ref 1.9–2.7)
MCH RBC QN AUTO: 31.8 PG (ref 26.8–34.3)
MCHC RBC AUTO-ENTMCNC: 32.1 G/DL (ref 31.4–37.4)
MCV RBC AUTO: 99 FL (ref 82–98)
MONOCYTES # BLD AUTO: 0.62 THOUSAND/ÂΜL (ref 0.17–1.22)
MONOCYTES NFR BLD AUTO: 11 % (ref 4–12)
NEUTROPHILS # BLD AUTO: 3.58 THOUSANDS/ÂΜL (ref 1.85–7.62)
NEUTS SEG NFR BLD AUTO: 62 % (ref 43–75)
NRBC BLD AUTO-RTO: 0 /100 WBCS
PLATELET # BLD AUTO: 137 THOUSANDS/UL (ref 149–390)
PMV BLD AUTO: 9.5 FL (ref 8.9–12.7)
POTASSIUM SERPL-SCNC: 4.6 MMOL/L (ref 3.5–5.3)
PROT SERPL-MCNC: 6.1 G/DL (ref 6.4–8.4)
RBC # BLD AUTO: 3.81 MILLION/UL (ref 3.88–5.62)
RSV RNA RESP QL NAA+PROBE: NEGATIVE
SARS-COV-2 RNA RESP QL NAA+PROBE: NEGATIVE
SODIUM SERPL-SCNC: 134 MMOL/L (ref 135–147)
TSH SERPL DL<=0.05 MIU/L-ACNC: 3.5 UIU/ML (ref 0.45–4.5)
WBC # BLD AUTO: 5.79 THOUSAND/UL (ref 4.31–10.16)

## 2024-08-19 PROCEDURE — 83880 ASSAY OF NATRIURETIC PEPTIDE: CPT | Performed by: EMERGENCY MEDICINE

## 2024-08-19 PROCEDURE — 99222 1ST HOSP IP/OBS MODERATE 55: CPT | Performed by: INTERNAL MEDICINE

## 2024-08-19 PROCEDURE — 80053 COMPREHEN METABOLIC PANEL: CPT | Performed by: EMERGENCY MEDICINE

## 2024-08-19 PROCEDURE — 36415 COLL VENOUS BLD VENIPUNCTURE: CPT | Performed by: EMERGENCY MEDICINE

## 2024-08-19 PROCEDURE — 99285 EMERGENCY DEPT VISIT HI MDM: CPT | Performed by: EMERGENCY MEDICINE

## 2024-08-19 PROCEDURE — 83690 ASSAY OF LIPASE: CPT | Performed by: EMERGENCY MEDICINE

## 2024-08-19 PROCEDURE — 82948 REAGENT STRIP/BLOOD GLUCOSE: CPT

## 2024-08-19 PROCEDURE — 84443 ASSAY THYROID STIM HORMONE: CPT | Performed by: EMERGENCY MEDICINE

## 2024-08-19 PROCEDURE — 83036 HEMOGLOBIN GLYCOSYLATED A1C: CPT | Performed by: INTERNAL MEDICINE

## 2024-08-19 PROCEDURE — 0241U HB NFCT DS VIR RESP RNA 4 TRGT: CPT | Performed by: EMERGENCY MEDICINE

## 2024-08-19 PROCEDURE — 83735 ASSAY OF MAGNESIUM: CPT | Performed by: EMERGENCY MEDICINE

## 2024-08-19 PROCEDURE — 85025 COMPLETE CBC W/AUTO DIFF WBC: CPT | Performed by: EMERGENCY MEDICINE

## 2024-08-19 PROCEDURE — 84484 ASSAY OF TROPONIN QUANT: CPT | Performed by: EMERGENCY MEDICINE

## 2024-08-19 PROCEDURE — 99285 EMERGENCY DEPT VISIT HI MDM: CPT

## 2024-08-19 PROCEDURE — 83605 ASSAY OF LACTIC ACID: CPT | Performed by: EMERGENCY MEDICINE

## 2024-08-19 RX ORDER — TIMOLOL MALEATE 5 MG/ML
1 SOLUTION/ DROPS OPHTHALMIC DAILY
Status: DISCONTINUED | OUTPATIENT
Start: 2024-08-20 | End: 2024-08-24 | Stop reason: HOSPADM

## 2024-08-19 RX ORDER — ALLOPURINOL 100 MG/1
100 TABLET ORAL 2 TIMES DAILY
Status: DISCONTINUED | OUTPATIENT
Start: 2024-08-19 | End: 2024-08-24 | Stop reason: HOSPADM

## 2024-08-19 RX ORDER — ALPRAZOLAM 0.5 MG
0.5 TABLET ORAL
Status: DISCONTINUED | OUTPATIENT
Start: 2024-08-19 | End: 2024-08-24 | Stop reason: HOSPADM

## 2024-08-19 RX ORDER — TORSEMIDE 20 MG/1
20 TABLET ORAL DAILY
Status: DISCONTINUED | OUTPATIENT
Start: 2024-08-19 | End: 2024-08-24

## 2024-08-19 RX ORDER — INSULIN LISPRO 100 [IU]/ML
1-5 INJECTION, SOLUTION INTRAVENOUS; SUBCUTANEOUS
Status: DISCONTINUED | OUTPATIENT
Start: 2024-08-20 | End: 2024-08-24 | Stop reason: HOSPADM

## 2024-08-19 RX ORDER — FERROUS SULFATE 325(65) MG
325 TABLET ORAL
Status: DISCONTINUED | OUTPATIENT
Start: 2024-08-20 | End: 2024-08-24 | Stop reason: HOSPADM

## 2024-08-19 RX ORDER — LATANOPROST 50 UG/ML
1 SOLUTION/ DROPS OPHTHALMIC
Status: DISCONTINUED | OUTPATIENT
Start: 2024-08-19 | End: 2024-08-24 | Stop reason: HOSPADM

## 2024-08-19 RX ORDER — ACETAMINOPHEN 325 MG/1
650 TABLET ORAL EVERY 6 HOURS PRN
Status: DISCONTINUED | OUTPATIENT
Start: 2024-08-19 | End: 2024-08-24 | Stop reason: HOSPADM

## 2024-08-19 RX ORDER — INSULIN LISPRO 100 [IU]/ML
1-5 INJECTION, SOLUTION INTRAVENOUS; SUBCUTANEOUS
Status: DISCONTINUED | OUTPATIENT
Start: 2024-08-19 | End: 2024-08-24 | Stop reason: HOSPADM

## 2024-08-19 RX ORDER — TAMSULOSIN HYDROCHLORIDE 0.4 MG/1
0.4 CAPSULE ORAL
Status: DISCONTINUED | OUTPATIENT
Start: 2024-08-20 | End: 2024-08-24 | Stop reason: HOSPADM

## 2024-08-19 RX ORDER — HYDRALAZINE HYDROCHLORIDE 25 MG/1
25 TABLET, FILM COATED ORAL EVERY 8 HOURS SCHEDULED
Status: DISCONTINUED | OUTPATIENT
Start: 2024-08-19 | End: 2024-08-24 | Stop reason: HOSPADM

## 2024-08-19 RX ORDER — ATORVASTATIN CALCIUM 40 MG/1
40 TABLET, FILM COATED ORAL
Status: DISCONTINUED | OUTPATIENT
Start: 2024-08-20 | End: 2024-08-24 | Stop reason: HOSPADM

## 2024-08-19 RX ORDER — DOCUSATE SODIUM 100 MG/1
100 CAPSULE, LIQUID FILLED ORAL 2 TIMES DAILY PRN
Status: DISCONTINUED | OUTPATIENT
Start: 2024-08-19 | End: 2024-08-24 | Stop reason: HOSPADM

## 2024-08-19 RX ADMIN — TORSEMIDE 20 MG: 20 TABLET ORAL at 22:54

## 2024-08-19 RX ADMIN — HYDRALAZINE HYDROCHLORIDE 25 MG: 25 TABLET ORAL at 22:53

## 2024-08-19 RX ADMIN — LATANOPROST 1 DROP: 50 SOLUTION OPHTHALMIC at 22:55

## 2024-08-19 RX ADMIN — APIXABAN 2.5 MG: 2.5 TABLET, FILM COATED ORAL at 22:54

## 2024-08-19 RX ADMIN — INSULIN LISPRO 1 UNITS: 100 INJECTION, SOLUTION INTRAVENOUS; SUBCUTANEOUS at 22:55

## 2024-08-19 RX ADMIN — ALLOPURINOL 100 MG: 100 TABLET ORAL at 22:54

## 2024-08-20 LAB
4HR DELTA HS TROPONIN: 5 NG/L
ALBUMIN SERPL BCG-MCNC: 3.3 G/DL (ref 3.5–5)
ALP SERPL-CCNC: 128 U/L (ref 34–104)
ALT SERPL W P-5'-P-CCNC: 13 U/L (ref 7–52)
ANION GAP SERPL CALCULATED.3IONS-SCNC: 9 MMOL/L (ref 4–13)
AST SERPL W P-5'-P-CCNC: 13 U/L (ref 13–39)
BACTERIA UR QL AUTO: NORMAL /HPF
BASOPHILS # BLD AUTO: 0.01 THOUSANDS/ÂΜL (ref 0–0.1)
BASOPHILS NFR BLD AUTO: 0 % (ref 0–1)
BILIRUB SERPL-MCNC: 1 MG/DL (ref 0.2–1)
BILIRUB UR QL STRIP: NEGATIVE
BUN SERPL-MCNC: 46 MG/DL (ref 5–25)
CALCIUM ALBUM COR SERPL-MCNC: 9.2 MG/DL (ref 8.3–10.1)
CALCIUM SERPL-MCNC: 8.6 MG/DL (ref 8.4–10.2)
CARDIAC TROPONIN I PNL SERPL HS: 20 NG/L
CHLORIDE SERPL-SCNC: 103 MMOL/L (ref 96–108)
CLARITY UR: CLEAR
CO2 SERPL-SCNC: 26 MMOL/L (ref 21–32)
COLOR UR: ABNORMAL
CREAT SERPL-MCNC: 1.66 MG/DL (ref 0.6–1.3)
EOSINOPHIL # BLD AUTO: 0.08 THOUSAND/ÂΜL (ref 0–0.61)
EOSINOPHIL NFR BLD AUTO: 2 % (ref 0–6)
ERYTHROCYTE [DISTWIDTH] IN BLOOD BY AUTOMATED COUNT: 16.8 % (ref 11.6–15.1)
EST. AVERAGE GLUCOSE BLD GHB EST-MCNC: 160 MG/DL
GFR SERPL CREATININE-BSD FRML MDRD: 37 ML/MIN/1.73SQ M
GLUCOSE P FAST SERPL-MCNC: 120 MG/DL (ref 65–99)
GLUCOSE SERPL-MCNC: 120 MG/DL (ref 65–140)
GLUCOSE SERPL-MCNC: 140 MG/DL (ref 65–140)
GLUCOSE SERPL-MCNC: 207 MG/DL (ref 65–140)
GLUCOSE SERPL-MCNC: 208 MG/DL (ref 65–140)
GLUCOSE SERPL-MCNC: 218 MG/DL (ref 65–140)
GLUCOSE UR STRIP-MCNC: NEGATIVE MG/DL
HBA1C MFR BLD: 7.2 %
HCT VFR BLD AUTO: 37.2 % (ref 36.5–49.3)
HGB BLD-MCNC: 12.1 G/DL (ref 12–17)
HGB UR QL STRIP.AUTO: NEGATIVE
IMM GRANULOCYTES # BLD AUTO: 0.02 THOUSAND/UL (ref 0–0.2)
IMM GRANULOCYTES NFR BLD AUTO: 0 % (ref 0–2)
KETONES UR STRIP-MCNC: NEGATIVE MG/DL
LEUKOCYTE ESTERASE UR QL STRIP: ABNORMAL
LYMPHOCYTES # BLD AUTO: 0.66 THOUSANDS/ÂΜL (ref 0.6–4.47)
LYMPHOCYTES NFR BLD AUTO: 13 % (ref 14–44)
MCH RBC QN AUTO: 31.4 PG (ref 26.8–34.3)
MCHC RBC AUTO-ENTMCNC: 32.5 G/DL (ref 31.4–37.4)
MCV RBC AUTO: 97 FL (ref 82–98)
MONOCYTES # BLD AUTO: 0.57 THOUSAND/ÂΜL (ref 0.17–1.22)
MONOCYTES NFR BLD AUTO: 11 % (ref 4–12)
NEUTROPHILS # BLD AUTO: 3.67 THOUSANDS/ÂΜL (ref 1.85–7.62)
NEUTS SEG NFR BLD AUTO: 74 % (ref 43–75)
NITRITE UR QL STRIP: NEGATIVE
NON-SQ EPI CELLS URNS QL MICRO: NORMAL /HPF
NRBC BLD AUTO-RTO: 0 /100 WBCS
PH UR STRIP.AUTO: 6.5 [PH]
PLATELET # BLD AUTO: 135 THOUSANDS/UL (ref 149–390)
PMV BLD AUTO: 9.3 FL (ref 8.9–12.7)
POTASSIUM SERPL-SCNC: 3.5 MMOL/L (ref 3.5–5.3)
PROT SERPL-MCNC: 5.7 G/DL (ref 6.4–8.4)
PROT UR STRIP-MCNC: ABNORMAL MG/DL
RBC # BLD AUTO: 3.85 MILLION/UL (ref 3.88–5.62)
RBC #/AREA URNS AUTO: NORMAL /HPF
SODIUM SERPL-SCNC: 138 MMOL/L (ref 135–147)
SP GR UR STRIP.AUTO: 1.01 (ref 1–1.03)
UROBILINOGEN UR STRIP-ACNC: <2 MG/DL
WBC # BLD AUTO: 5.01 THOUSAND/UL (ref 4.31–10.16)
WBC #/AREA URNS AUTO: NORMAL /HPF

## 2024-08-20 PROCEDURE — 81001 URINALYSIS AUTO W/SCOPE: CPT | Performed by: EMERGENCY MEDICINE

## 2024-08-20 PROCEDURE — 97162 PT EVAL MOD COMPLEX 30 MIN: CPT

## 2024-08-20 PROCEDURE — 80053 COMPREHEN METABOLIC PANEL: CPT | Performed by: INTERNAL MEDICINE

## 2024-08-20 PROCEDURE — 85025 COMPLETE CBC W/AUTO DIFF WBC: CPT | Performed by: INTERNAL MEDICINE

## 2024-08-20 PROCEDURE — 99232 SBSQ HOSP IP/OBS MODERATE 35: CPT

## 2024-08-20 PROCEDURE — 82948 REAGENT STRIP/BLOOD GLUCOSE: CPT

## 2024-08-20 PROCEDURE — 84484 ASSAY OF TROPONIN QUANT: CPT | Performed by: EMERGENCY MEDICINE

## 2024-08-20 RX ADMIN — HYDRALAZINE HYDROCHLORIDE 25 MG: 25 TABLET ORAL at 06:14

## 2024-08-20 RX ADMIN — ALLOPURINOL 100 MG: 100 TABLET ORAL at 08:01

## 2024-08-20 RX ADMIN — ATORVASTATIN CALCIUM 40 MG: 40 TABLET, FILM COATED ORAL at 16:44

## 2024-08-20 RX ADMIN — INSULIN LISPRO 1 UNITS: 100 INJECTION, SOLUTION INTRAVENOUS; SUBCUTANEOUS at 11:18

## 2024-08-20 RX ADMIN — TORSEMIDE 20 MG: 20 TABLET ORAL at 08:01

## 2024-08-20 RX ADMIN — INSULIN LISPRO 2 UNITS: 100 INJECTION, SOLUTION INTRAVENOUS; SUBCUTANEOUS at 22:36

## 2024-08-20 RX ADMIN — TIMOLOL MALEATE 1 DROP: 5 SOLUTION OPHTHALMIC at 08:01

## 2024-08-20 RX ADMIN — FERROUS SULFATE TAB 325 MG (65 MG ELEMENTAL FE) 325 MG: 325 (65 FE) TAB at 08:01

## 2024-08-20 RX ADMIN — TAMSULOSIN HYDROCHLORIDE 0.4 MG: 0.4 CAPSULE ORAL at 16:44

## 2024-08-20 RX ADMIN — ALLOPURINOL 100 MG: 100 TABLET ORAL at 17:17

## 2024-08-20 RX ADMIN — APIXABAN 2.5 MG: 2.5 TABLET, FILM COATED ORAL at 08:01

## 2024-08-20 RX ADMIN — HYDRALAZINE HYDROCHLORIDE 25 MG: 25 TABLET ORAL at 22:36

## 2024-08-20 RX ADMIN — LATANOPROST 1 DROP: 50 SOLUTION OPHTHALMIC at 22:37

## 2024-08-20 RX ADMIN — HYDRALAZINE HYDROCHLORIDE 25 MG: 25 TABLET ORAL at 13:28

## 2024-08-20 RX ADMIN — INSULIN LISPRO 1 UNITS: 100 INJECTION, SOLUTION INTRAVENOUS; SUBCUTANEOUS at 16:43

## 2024-08-20 RX ADMIN — APIXABAN 2.5 MG: 2.5 TABLET, FILM COATED ORAL at 17:17

## 2024-08-20 NOTE — ASSESSMENT & PLAN NOTE
-As per records, lives with 100 year old sister in law  -Noncompliant with medication and Aox2 (self, place)  -Case management consulted, patient may require placement  -PT/OT consulted

## 2024-08-20 NOTE — H&P
[facility]  H&P  Name: Yao Tipton 83 y.o. male I MRN: [id number]  Unit/Bed#: ED HW3 I Date of Admission: 8/19/2024   Date of Service: 8/19/2024 I Hospital Day: 0      Assessment & Plan   Gout  Assessment & Plan  -Continue allopurinol    BPH (benign prostatic hyperplasia)  Assessment & Plan  -Continue tamsulosin    Dyslipidemia  Assessment & Plan  -Continue atorvastatin nightly    Chronic combined systolic and diastolic CHF (congestive heart failure) (HCC)  Assessment & Plan  -BNP elevated. No evidence of decompensation            Chronic anemia  Assessment & Plan  -Hgb at baseline, no active bleeding  -Continue ferrous sulfate    Benign hypertension with CKD (chronic kidney disease) stage IV (HCC)  Assessment & Plan  -Presenting to ED with /93, noncompliant with medication  -Renal function at baseline  -Start amlodipine and hydralazine for BP goal <140/80    Chronic atrial fibrillation (HCC)  Assessment & Plan  -HR 78, hypertensive in ED  -CHADSVASc score 5  -Continue home medication eliquis, monitor for bleeding    Diabetes mellitus with peripheral artery disease (HCC)  Assessment & Plan  -Previous A1c 8.8  -Hold home anti glycemic medication  -Continue ISS with glucose checks        * Unable to care for self  Assessment & Plan  -As per records, lives with 100 year old sister in law  -Noncompliant with medication and Aox2 (self, place)  -Case management consulted, patient may require placement  -PT consulted    VTE Pharmacologic Prophylaxis: VTE Score: 4 Moderate Risk (Score 3-4) - Pharmacological DVT Prophylaxis Ordered: apixaban (Eliquis).  Code Status: DNAR & DNI  Discussion with family: Patient declined call to .     Anticipated Length of Stay: Patient will be admitted on an observation basis with an anticipated length of stay of less than 2 midnights secondary to inability to care for self.    Total Time Spent on Date of Encounter in care of patient: 25 mins.  This time was spent on one or more of the following: performing physical exam; counseling and coordination of care; obtaining or reviewing history; documenting in the medical record; reviewing/ordering tests, medications or procedures; communicating with other healthcare professionals and discussing with patient's family/caregivers.    Chief Complaint: inability to care for self    History of Present Illness:  Yao Tipton is a 83 y.o. male with a PMH of T2DM, HTN, Hyperlipidemia, CKD, CAD, Chronic afib on eliquis, Chronic anemia presenting to the ED due to inability to care for self. Information obtained from previous documentation as patient is a poor historian. Today patient's son was admitted for HTN and suicidal ideation. A family guidance crisis worker did mobile outreach and thought it was unsafe for patient to be at home without family to assist in care. Patient is Aox2 during interview. Reports noncompliance with medication and does not know which medications he takes. ROS unobtainable    Review of Systems:  Review of Systems   Unable to perform ROS: Mental status change       Past Medical and Surgical History:   Past Medical History:   Diagnosis Date    Atrial fibrillation (HCC)     Chronic kidney disease     Coronary artery disease     Diabetes mellitus (HCC)     Hyperlipidemia     Hypertension        Past Surgical History:   Procedure Laterality Date    CARDIAC CATHETERIZATION N/A 6/30/2023    Procedure: Cardiac pericardiocentesis;  Surgeon: Shanna Adame DO;  [facility]: [facility];  Service: Cardiology    CARDIAC CATHETERIZATION N/A 6/30/2023    Procedure: Cardiac RHC;  Surgeon: Shanna Adame DO;  Location: [facility];  Service: Cardiology    EYE SURGERY      IR CHEST TUBE PLACEMENT  6/24/2023    IR CHEST TUBE PLACEMENT  7/28/2023    IR PLEURAL EFFUSION LONG-TERM CATHETER PLACEMENT  8/7/2023    IR PLEURAL EFFUSION LONG-TERM CATHETER REMOVAL  1/3/2024    IR THORACENTESIS   12/11/2023    SKIN CANCER EXCISION      TONSILLECTOMY         Meds/Allergies:  Prior to Admission medications    Medication Sig Start Date End Date Taking? Authorizing Provider   allopurinol (ZYLOPRIM) 100 mg tablet Take 100 mg by mouth 2 (two) times a day    Historical Provider, MD   ALPRAZolam (XANAX) 0.5 mg tablet 0.5 mg daily at bedtime as needed for anxiety or sleep 3/12/18   Historical Provider, MD   apixaban (Eliquis) 2.5 mg Take 1 tablet (2.5 mg total) by mouth 2 (two) times a day 8/8/24   SUSANA Kaur   ascorbic acid (VITAMIN C) 500 MG tablet Take 1 tablet (500 mg total) by mouth daily 1/8/21   Anna Dahl MD   atorvastatin (LIPITOR) 40 mg tablet Take 1 tablet (40 mg total) by mouth daily with dinner 1/16/19   Phoebe Perez,    Blood Pressure Monitor NILTON by Does not apply route daily 2/19/20   Fredi Guadarrama MD   cholecalciferol (VITAMIN D3) 1,000 units tablet Take 2 tablets (2,000 Units total) by mouth daily Do not start before July 12, 2023. 7/12/23 4/29/24  Yousuf Pressley MD   docusate sodium (COLACE) 100 mg capsule Take 1 capsule (100 mg total) by mouth 2 (two) times a day as needed for constipation 8/10/23   Katrina Lobo PA-C   ferrous sulfate 325 (65 Fe) mg tablet Take 325 mg by mouth daily with breakfast    Historical Provider, MD   glimepiride (AMARYL) 2 mg tablet Take 1 tablet (2 mg total) by mouth daily with dinner 2/27/23   Anna Dahl MD   halobetasol (ULTRAVATE) 0.05 % cream  12/7/23   Historical Provider, MD   insulin aspart (NovoLOG FlexPen) 100 UNIT/ML injection pen  5/20/24   Historical Provider, MD   insulin lispro (HumaLOG) 100 units/mL injection Inject 1-5 Units under the skin 3 (three) times a day before meals 11/22/23   SUSANA Mcfarlane   insulin lispro (HumaLOG) 100 units/mL injection Inject 1-5 Units under the skin daily at bedtime 11/22/23   SUSANA Mcfarlane   latanoprost (XALATAN) 0.005 % ophthalmic solution 1 drop daily at bedtime    Historical  "Provider, rylie Perezgn, 100 UNIT/ML SOPN  11/16/23   Historical Provider, MD   Sure Comfort Pen Needles 32G X 4 MM MISC  5/30/24   Historical Provider, MD   tamsulosin (FLOMAX) 0.4 mg Take 0.4 mg by mouth daily with dinner    Historical Provider, MD   timolol (TIMOPTIC) 0.5 % ophthalmic solution Administer 1 drop to both eyes daily 1/7/21   Anna Dahl MD   torsemide (DEMADEX) 20 mg tablet  7/5/24   Historical Provider, MD   ZINC OXIDE PO Take by mouth daily    Historical Provider, MD     I have reviewed home medications using recent Epic encounter.    Allergies:   Allergies   Allergen Reactions    Elemental Sulfur     Sulfa Antibiotics        Social History:  Marital Status:    Patient Pre-hospital Living Situation: With other family member: Son, Sister in law  Patient Pre-hospital Level of Mobility: walks  Substance Use History:   Social History     Substance and Sexual Activity   Alcohol Use Not Currently    Alcohol/week: 0.0 standard drinks of alcohol    Comment: special occasions     Social History     Tobacco Use   Smoking Status Never   Smokeless Tobacco Former   Tobacco Comments    Smoked a pipe 50 years ago     Social History     Substance and Sexual Activity   Drug Use Not Currently       Family History:  Family History   Problem Relation Age of Onset    Heart disease Mother     Diabetes Father     Vision loss Father     Cancer Sister     Diabetes Sister        Physical Exam:     Vitals:   Blood Pressure: (!) 191/93 (08/19/24 1835)  Pulse: 78 (08/19/24 1835)  Temperature: 97.5 °F (36.4 °C) (08/19/24 1835)  Respirations: 18 (08/19/24 1835)  Height: 5' 7\" (170.2 cm) (08/19/24 1835)  Weight - Scale: 66.7 kg (147 lb) (08/19/24 1835)  SpO2: 98 % (08/19/24 1835)    Physical Exam  HENT:      Head: Normocephalic and atraumatic.      Nose: Nose normal.      Mouth/Throat:      Mouth: Mucous membranes are moist.   Eyes:      Extraocular Movements: Extraocular movements intact.      Pupils: Pupils " are equal, round, and reactive to light.   Cardiovascular:      Rate and Rhythm: Normal rate. Rhythm irregular.      Pulses: Normal pulses.      Heart sounds: Normal heart sounds.   Pulmonary:      Effort: Pulmonary effort is normal.      Breath sounds: Normal breath sounds.   Abdominal:      General: Abdomen is flat. Bowel sounds are normal.      Palpations: Abdomen is soft.   Musculoskeletal:         General: Normal range of motion.      Cervical back: Normal range of motion.   Skin:     General: Skin is warm and dry.      Capillary Refill: Capillary refill takes less than 2 seconds.   Neurological:      Mental Status: He is alert. He is disoriented.         Additional Data:     Lab Results:  Results from last 7 days   Lab Units 08/19/24  1903   WBC Thousand/uL 5.79   HEMOGLOBIN g/dL 12.1   HEMATOCRIT % 37.7   PLATELETS Thousands/uL 137*   SEGS PCT % 62   LYMPHO PCT % 25   MONO PCT % 11   EOS PCT % 2     Results from last 7 days   Lab Units 08/19/24  1903   SODIUM mmol/L 134*   POTASSIUM mmol/L 4.6   CHLORIDE mmol/L 102   CO2 mmol/L 27   BUN mg/dL 51*   CREATININE mg/dL 1.87*   ANION GAP mmol/L 5   CALCIUM mg/dL 8.7   ALBUMIN g/dL 3.5   TOTAL BILIRUBIN mg/dL 0.84   ALK PHOS U/L 146*   ALT U/L 15   AST U/L 16   GLUCOSE RANDOM mg/dL 164*             Lab Results   Component Value Date    HGBA1C 8.8 (H) 11/20/2023    HGBA1C 8.3 (H) 02/23/2023    HGBA1C 8.5 10/18/2022     Results from last 7 days   Lab Units 08/19/24  1903   LACTIC ACID mmol/L 1.1       Lines/Drains:  Invasive Devices       Peripheral Intravenous Line  Duration             Peripheral IV 08/19/24 Distal;Left;Ventral (anterior) Forearm <1 day                        Imaging: No pertinent imaging reviewed.  No orders to display       EKG and Other Studies Reviewed on Admission:   EKG: Personally Reviewed.    ** Please Note: This note has been constructed using a voice recognition system. **

## 2024-08-20 NOTE — ASSESSMENT & PLAN NOTE
-Presenting to ED with /93, noncompliant with medication  -Renal function at baseline  -Continue torsemide and initiate hydralazine for BP goal <140/80

## 2024-08-20 NOTE — PLAN OF CARE
Problem: PHYSICAL THERAPY ADULT  Goal: Performs mobility at highest level of function for planned discharge setting.  See evaluation for individualized goals.  Description: Treatment/Interventions: Functional transfer training, LE strengthening/ROM, Endurance training, Therapeutic exercise, Bed mobility, Gait training, Spoke to nursing          See flowsheet documentation for full assessment, interventions and recommendations.  Note: Prognosis: Good  Problem List: Decreased endurance, Impaired balance, Decreased mobility, Decreased cognition  Assessment: Patient seen for Physical Therapy evaluation. Patient admitted with Unable to care for self.  Comorbidities affecting patient's physical performance include: Afib, anemia, cardiac disease, chronic pain, CHF, CKD, COVID-19, dementia, diabetes, and gout.  Personal factors affecting patient at time of initial evaluation include: lives in 2 story house, ambulating with assistive device, inability to navigate community distances, inability to navigate level surfaces without external assistance, decreased cognition, limited home support, inability to perform ADLS, inability to perform IADLS , and inability to live alone. Prior to admission, patient was independent with functional mobility with walker, requiring assist for ADLS, requiring assist for IADLS, living with son + DIL in a 2 level home with 0 steps to enter, and ambulating household distance.  Please find objective findings from Physical Therapy assessment regarding body systems outlined above with impairments and limitations including impaired balance, decreased endurance, gait deviations, decreased activity tolerance, decreased functional mobility tolerance, decreased safety awareness, impaired judgement, fall risk, and decreased cognition.  The Barthel Index was used as a functional outcome tool presenting with a score of Barthel Index Score: 55 today indicating marked limitations of functional mobility and  ADLS.  Patient's clinical presentation is currently unstable/unpredictable as seen in patient's presentation of increased fall risk and decreased endurance. Pt would benefit from continued Physical Therapy treatment to address deficits as defined above and maximize level of functional mobility. As demonstrated by objective findings, the assigned level of complexity for this evaluation is high.The patient's AM-PAC Basic Mobility Inpatient Short Form Raw Score is 18. A Raw score of greater than 16 suggests the patient may benefit from discharge to home. Please also refer to the recommendation of the Physical Therapist for safe discharge planning.        Rehab Resource Intensity Level, PT: No post-acute rehabilitation needs    See flowsheet documentation for full assessment.

## 2024-08-20 NOTE — CASE MANAGEMENT
Case Management Discharge Planning Note    Patient name Yao Tipton  Location 2 South 2 South 204 B MRN [id number]  : 1940 Date 2024       Current Admission Date: 2024  Current Admission Diagnosis:Unable to care for self   Patient Active Problem List    Diagnosis Date Noted Date Diagnosed    Unable to care for self 2024     Gout 2024     Left ankle pain 2023     Chronic combined systolic and diastolic CHF (congestive heart failure) ([facility]) 2023     Dyslipidemia 2023     BPH (benign prostatic hyperplasia) 2023     Cellulitis of left lower extremity 2023     Constipation 08/10/2023     Pleural effusion exudative 2023     Goals of care, counseling/discussion 2023     Duodenal ulcer 07/10/2023     Encephalopathy 2023     Recent empyema of lung with persistent locuted R hydropneumothorax 2023     Hypoglycemia 2023     Thrombocytopenia ([facility]) 2023     Diarrhea 2023     Hypothermia 2023     Septic shock ([facility]) 2023     Urinary retention 2023     Macrocytosis 2023     Chronic anemia 2023     Chronic kidney disease-mineral and bone disorder 2023     Advanced care planning/counseling discussion 2023     Benign hypertension with CKD (chronic kidney disease) stage IV ([facility]) 2022     Persistent proteinuria 2022     COVID-19 2022     Electrolyte abnormality 2022     Cellulitis of left upper extremity 2021     Chronic atrial fibrillation ([facility]) 2021     Vitamin D deficiency 10/18/2019     Chronic kidney disease, stage 4 (severe) ([facility]) 2019     Acute on chronic combined systolic and diastolic congestive heart failure ([facility]) 2019     Recent pericardial effusion 2019     Leg edema 01/10/2019     Diabetes mellitus with peripheral artery disease ([facility]) 01/10/2019     PVC (premature ventricular contraction) 2018     Essential  hypertension 12/19/2018     Closed traumatic displaced fracture of distal end of left radius with malunion 02/09/2018       LOS (days): 0  Geometric Mean LOS (GMLOS) (days):   Days to GMLOS:     OBJECTIVE:            Current admission status: Inpatient   Preferred Pharmacy:   [id number] [place] [place] [facility] [address]  [facility]  [address] [address]  Phone: [phone] Fax: [phone]    Primary Care Provider: Nicho Baltazar DO    Primary Insurance: MEDICARE  Secondary Insurance: BLUE CROSS    DISCHARGE DETAILS:    Discharge planning discussed with:: [facility]     Other Referral/Resources/Interventions Provided:  Interventions: Respite Care  Referral Comments: RN CM called and spoke with Elayne at [facility] regarding patient's need for respite and was informed that someone will call RN CM back.CRYS received from Teresa from [facility] and she left her work cell # [phone]. RN CM called that # back multiple times, no answer and unable to leave . RN CM will continue to follow and call Teresa again.

## 2024-08-20 NOTE — PLAN OF CARE
Problem: Prexisting or High Potential for Compromised Skin Integrity  Goal: Skin integrity is maintained or improved  Description: INTERVENTIONS:  - Identify patients at risk for skin breakdown  - Assess and monitor skin integrity  - Assess and monitor nutrition and hydration status  - Monitor labs   - Assess for incontinence   - Turn and reposition patient  - Assist with mobility/ambulation  - Relieve pressure over bony prominences  - Avoid friction and shearing  - Provide appropriate hygiene as needed including keeping skin clean and dry  - Evaluate need for skin moisturizer/barrier cream  - Collaborate with interdisciplinary team   - Patient/family teaching  - Consider wound care consult   Outcome: Progressing     Problem: SAFETY ADULT  Goal: Patient will remain free of falls  Description: INTERVENTIONS:  - Educate patient/family on patient safety including physical limitations  - Instruct patient to call for assistance with activity   - Consult OT/PT to assist with strengthening/mobility   - Keep Call bell within reach  - Keep bed low and locked with side rails adjusted as appropriate  - Keep care items and personal belongings within reach  - Initiate and maintain comfort rounds  - Make Fall Risk Sign visible to staff  - Offer Toileting every 2 Hours, in advance of need  - Initiate/Maintain bed alarm  - Obtain necessary fall risk management equipment  - Apply yellow socks and bracelet for high fall risk patients  - Consider moving patient to room near nurses station  Outcome: Progressing       Problem: DISCHARGE PLANNING  Goal: Discharge to home or other facility with appropriate resources  Description: INTERVENTIONS:  - Identify barriers to discharge w/patient and caregiver  - Arrange for needed discharge resources and transportation as appropriate  - Identify discharge learning needs (meds, wound care, etc.)  - Arrange for interpretive services to assist at discharge as needed  - Refer to Case Management  Department for coordinating discharge planning if the patient needs post-hospital services based on physician/advanced practitioner order or complex needs related to functional status, cognitive ability, or social support system  Outcome: Progressing

## 2024-08-20 NOTE — PROGRESS NOTES
[facility]ly [facility]  Progress Note  Name: Yao Tipton I  MRN: [id number]  Unit/Bed#: 2 South 204 B I Date of Admission: 8/19/2024   Date of Service: 8/20/2024 I Hospital Day: 0    Assessment & Plan   * Unable to care for self  Assessment & Plan  -As per records, lives with 100 year old sister in law  -Noncompliant with medication and Aox2 (self, place)  -Case management consulted, patient may require placement  -PT/OT consulted    Benign hypertension with CKD (chronic kidney disease) stage IV (HCC)  Assessment & Plan  -Presenting to ED with /93, noncompliant with medication  -Renal function at baseline  -Continue torsemide and initiate hydralazine for BP goal <140/80    Gout  Assessment & Plan  -Continue allopurinol    BPH (benign prostatic hyperplasia)  Assessment & Plan  -Continue tamsulosin    Dyslipidemia  Assessment & Plan  -Continue atorvastatin nightly    Chronic combined systolic and diastolic CHF (congestive heart failure) ([facility])  Assessment & Plan  -BNP elevated at 257  No evidence of decompensation  Continue torsemide            Chronic anemia  Assessment & Plan  -Hgb at baseline, no active bleeding  -Continue ferrous sulfate    Chronic atrial fibrillation (HCC)  Assessment & Plan  -HR 78, hypertensive in ED  -CHADSVASc score 5  -Continue home medication eliquis, monitor for bleeding    Diabetes mellitus with peripheral artery disease (HCC)  Assessment & Plan  -Previous A1c 8.8  -Hold home anti glycemic medication  -Continue ISS with glucose checks                   VTE Pharmacologic Prophylaxis: VTE Score: 4 Moderate Risk (Score 3-4) - Pharmacological DVT Prophylaxis Ordered: apixaban (Eliquis).    Mobility:   Basic Mobility Inpatient Raw Score: 17  JH-HLM Goal: 5: Stand one or more mins  JH-HLM Achieved: 2: Bed activities/Dependent transfer  JH-HLM Goal achieved. Continue to encourage appropriate mobility.    Patient Centered Rounds: I performed bedside rounds with nursing staff  today.   Discussions with Specialists or Other Care Team Provider: Yes    Education and Discussions with Family / Patient:  Patient's contact is hospitalized ( son).     Total Time Spent on Date of Encounter in care of patient:  mins. This time was spent on one or more of the following: performing physical exam; counseling and coordination of care; obtaining or reviewing history; documenting in the medical record; reviewing/ordering tests, medications or procedures; communicating with other healthcare professionals and discussing with patient's family/caregivers.    Current Length of Stay: 0 day(s)  Current Patient Status: Observation   Certification Statement: The patient will continue to require additional inpatient hospital stay due to safe discharge planning  Discharge Plan:  TBD    Code Status: Level 3 - DNAR and DNI    Subjective:   Seen and examined resting comfortably in bed.  Alert and oriented to person and place.  Denies any acute overnight event.  Reported breakfast with toleration..  Offers no acute complaints    Objective:     Vitals:   Temp (24hrs), Av.6 °F (36.4 °C), Min:97.2 °F (36.2 °C), Max:97.8 °F (36.6 °C)    Temp:  [97.2 °F (36.2 °C)-97.8 °F (36.6 °C)] 97.8 °F (36.6 °C)  HR:  [60-91] 88  Resp:  [17-18] 18  BP: (147-191)/() 147/86  SpO2:  [97 %-100 %] 98 %  Body mass index is 23.21 kg/m².     Input and Output Summary (last 24 hours):     Intake/Output Summary (Last 24 hours) at 2024 1051  Last data filed at 2024 1026  Gross per 24 hour   Intake --   Output 1000 ml   Net -1000 ml       Physical Exam:   Physical Exam  Vitals and nursing note reviewed.   Constitutional:       General: He is not in acute distress.     Appearance: He is well-developed. He is ill-appearing.   HENT:      Head: Normocephalic.      Mouth/Throat:      Mouth: Mucous membranes are moist.   Eyes:      Conjunctiva/sclera: Conjunctivae normal.   Cardiovascular:      Rate and Rhythm: Rhythm irregular.       Pulses: Normal pulses.   Pulmonary:      Effort: Pulmonary effort is normal.   Abdominal:      Palpations: Abdomen is soft.   Musculoskeletal:         General: No swelling.      Cervical back: Normal range of motion.   Skin:     General: Skin is warm and dry.      Capillary Refill: Capillary refill takes less than 2 seconds.   Neurological:      General: No focal deficit present.      Mental Status: He is alert. Mental status is at baseline.      Motor: Weakness present.   Psychiatric:         Mood and Affect: Mood normal.          Additional Data:     Labs:  Results from last 7 days   Lab Units 08/20/24  0448   WBC Thousand/uL 5.01   HEMOGLOBIN g/dL 12.1   HEMATOCRIT % 37.2   PLATELETS Thousands/uL 135*   SEGS PCT % 74   LYMPHO PCT % 13*   MONO PCT % 11   EOS PCT % 2     Results from last 7 days   Lab Units 08/20/24  0448   SODIUM mmol/L 138   POTASSIUM mmol/L 3.5   CHLORIDE mmol/L 103   CO2 mmol/L 26   BUN mg/dL 46*   CREATININE mg/dL 1.66*   ANION GAP mmol/L 9   CALCIUM mg/dL 8.6   ALBUMIN g/dL 3.3*   TOTAL BILIRUBIN mg/dL 1.00   ALK PHOS U/L 128*   ALT U/L 13   AST U/L 13   GLUCOSE RANDOM mg/dL 120         Results from last 7 days   Lab Units 08/20/24  0706 08/19/24  2225   POC GLUCOSE mg/dl 140 156*         Results from last 7 days   Lab Units 08/19/24  1903   LACTIC ACID mmol/L 1.1       Lines/Drains:  Invasive Devices       Peripheral Intravenous Line  Duration             Peripheral IV 08/19/24 Distal;Left;Ventral (anterior) Forearm <1 day                          Imaging: No pertinent imaging reviewed.    Recent Cultures (last 7 days):         Last 24 Hours Medication List:   Current Facility-Administered Medications   Medication Dose Route Frequency Provider Last Rate    acetaminophen  650 mg Oral Q6H PRN Nba Hyman MD      allopurinol  100 mg Oral BID Nba Hyman MD      ALPRAZolam  0.5 mg Oral HS PRN Nba Hyman MD      apixaban  2.5 mg Oral BID Nba Hyman MD      atorvastatin  40  mg Oral Daily With Dinner Nba Hyman MD      docusate sodium  100 mg Oral BID PRN Nba Hyman MD      ferrous sulfate  325 mg Oral Daily With Breakfast Nba Hyman MD      hydrALAZINE  25 mg Oral Q8H RASHAD Nba Hyman MD      insulin lispro  1-5 Units Subcutaneous TID AC Nba Hyman MD      insulin lispro  1-5 Units Subcutaneous HS Nba Hyman MD      latanoprost  1 drop Both Eyes HS Nba Hyman MD      tamsulosin  0.4 mg Oral Daily With Dinner Nba Hyman MD      timolol  1 drop Both Eyes Daily Nba Hyman MD      torsemide  20 mg Oral Daily Nba Hyman MD          Today, Patient Was Seen By: SUSANA Mcgregor    **Please Note: This note may have been constructed using a voice recognition system.**

## 2024-08-20 NOTE — PHYSICAL THERAPY NOTE
"   PT EVALUATION       08/20/24 1118   PT Last Visit   PT Visit Date 08/20/24   Note Type   Note type Evaluation   Pain Assessment   Pain Assessment Tool 0-10   Pain Score No Pain   Patient's Stated Pain Goal No pain   Multiple Pain Sites No   Restrictions/Precautions   Weight Bearing Precautions Per Order No   Other Precautions Fall Risk;Pain   Home Living   Type of Home House   Home Layout Two level;Performs ADLs on one level;Able to live on main level with bedroom/bathroom  (no MOHINDER ; lives 1st floor)   Bathroom Equipment Shower chair   Home Equipment Walker   Prior Function   Level of [place] Independent with functional mobility;Needs assistance with ADLs;Needs assistance with IADLS  (Pt reports intermittent assist with dressing/bathing from son)   Lives With Son  (+ DIL)   Receives Help From Family   IADLs Family/Friend/Other provides transportation;Family/Friend/Other provides meals   Falls in the last 6 months 0   Vocational Retired   General   Additional Pertinent History Pt is an 83 year-old male who was admitted to the hospital on 8/19/24 due to inability to care for himself at home. Son is his caretaker, currently admitted to the hospital   Family/Caregiver Present No   Cognition   Overall Cognitive Status Impaired   Arousal/Participation Cooperative   Orientation Level Oriented to person;Oriented to place;Disoriented to time;Disoriented to situation   Following Commands Follows multistep commands with increased time or repetition   Subjective   Subjective \"I want to go home\"   RLE Assessment   RLE Assessment WFL   LLE Assessment   LLE Assessment WFL   Bed Mobility   Supine to Sit 5  Supervision   Additional items Verbal cues;Increased time required;HOB elevated;Bedrails   Sit to Supine 5  Supervision   Additional items Verbal cues;Increased time required;HOB elevated;Bedrails   Transfers   Sit to Stand 4  Minimal assistance  (progressing to supervision)   Additional items Verbal cues;Increased time " required   Stand to Sit 4  Minimal assistance   Additional items Verbal cues   Ambulation/Elevation   Gait pattern Short stride;Foward flexed;Step through pattern  (decreased gait speed)   Gait Assistance 4  Minimal assist  (progressing to supervision)   Additional items Assist x 1;Verbal cues   Assistive Device Rolling walker   Distance 15 feet x 2 ; 40 feet   Stair Management Assistance Not tested   Balance   Static Sitting Fair +   Dynamic Sitting Fair   Static Standing Fair   Dynamic Standing Fair   Ambulatory Fair -  (RW)   Activity Tolerance   Activity Tolerance Patient tolerated treatment well;Patient limited by fatigue   Nurse Made Aware yes   Assessment   Prognosis Good   Problem List Decreased endurance;Impaired balance;Decreased mobility;Decreased cognition   Assessment Patient seen for Physical Therapy evaluation. Patient admitted with Unable to care for self.  Comorbidities affecting patient's physical performance include: Afib, anemia, cardiac disease, chronic pain, CHF, CKD, COVID-19, dementia, diabetes, and gout.  Personal factors affecting patient at time of initial evaluation include: lives in 2 story house, ambulating with assistive device, inability to navigate community distances, inability to navigate level surfaces without external assistance, decreased cognition, limited home support, inability to perform ADLS, inability to perform IADLS , and inability to live alone. Prior to admission, patient was independent with functional mobility with walker, requiring assist for ADLS, requiring assist for IADLS, living with son + DIL in a 2 level home with 0 steps to enter, and ambulating household distance.  Please find objective findings from Physical Therapy assessment regarding body systems outlined above with impairments and limitations including impaired balance, decreased endurance, gait deviations, decreased activity tolerance, decreased functional mobility tolerance, decreased safety  awareness, impaired judgement, fall risk, and decreased cognition.  The Barthel Index was used as a functional outcome tool presenting with a score of Barthel Index Score: 55 today indicating marked limitations of functional mobility and ADLS.  Patient's clinical presentation is currently unstable/unpredictable as seen in patient's presentation of increased fall risk and decreased endurance. Pt would benefit from continued Physical Therapy treatment to address deficits as defined above and maximize level of functional mobility. As demonstrated by objective findings, the assigned level of complexity for this evaluation is high.The patient's -[facility] Basic Mobility Inpatient Short Form Raw Score is 18. A Raw score of greater than 16 suggests the patient may benefit from discharge to home. Please also refer to the recommendation of the Physical Therapist for safe discharge planning.   Goals   Patient Goals to go home   STG Expiration Date 08/27/24   Short Term Goal #1 Pt will perform bed mobility/transfers IND ; Pt will ambulate 50 feet Mod i with RW ; Standing balance will improve to fair+ to decrease risk of falls ; AMPAC score will improve >20/24 to demonstrate improved functional independence   LTG Expiration Date 09/03/24   Long Term Goal #1 Pt will ambulate 150 feet Mod i with RW ; Standing balance will improve to good to decrease risk of falls ; AMPAC score will improve >22/24 to demonstrate improved functional independence   Plan   Treatment/Interventions Functional transfer training;LE strengthening/ROM;Endurance training;Therapeutic exercise;Bed mobility;Gait training;Spoke to nursing   PT Frequency 2-3x/wk   Discharge Recommendation   Rehab Resource Intensity Level, PT No post-acute rehabilitation needs   AM-[facility] Basic Mobility Inpatient   Turning in Flat Bed Without Bedrails 3   Lying on Back to Sitting on Edge of Flat Bed Without Bedrails 3   Moving Bed to Chair 3   Standing Up From Chair Using Arms 3   Walk  in Room 3   Climb 3-5 Stairs With Railing 3   Basic Mobility Inpatient Raw Score 18   Basic Mobility Standardized Score 41.05   [facility] Highest Level Of Mobility   -HLM Goal 6: Walk 10 steps or more   -HLM Achieved 7: Walk 25 feet or more   Barthel Index   Feeding 10   Bathing 0   Grooming Score 5   Dressing Score 10   Bladder Score 5   Bowels Score 10   Toilet Use Score 5   Transfers (Bed/Chair) Score 10   Mobility (Level Surface) Score 0   Stairs Score 0   Barthel Index Score 55   End of Consult   Patient Position at End of Consult Supine;All needs within reach   Licensure   [place] License Number  Mala Ramsay [id number]

## 2024-08-20 NOTE — ASSESSMENT & PLAN NOTE
-HR 78, hypertensive in ED  -CHADSVASc score 5  -Continue home medication eliquis, monitor for bleeding

## 2024-08-20 NOTE — ASSESSMENT & PLAN NOTE
-As per records, lives with 100 year old sister in law  -Noncompliant with medication and Aox2 (self, place)  -Case management consulted, patient may require placement  -PT consulted

## 2024-08-20 NOTE — ED PROVIDER NOTES
History  Chief Complaint   Patient presents with    Medical Problem     His son is his caretaker and currently admitted to hospital. There is no one to take care of pt and he has no complaints      83-year-old male with past history of diabetes, hypertension, hyperlipidemia, CKD, CAD, atrial fibrillation, presents to the ED for unable to care for himself.  Patient lives with his son and 100-year-old sister-in-law.  Son was admitted today for hypertension and suicide ideation.  When family guidance crisis worker went to do a mobile out reach, she did not think it was safe for patient to be home without his son/caregiver.  Patient is alert and oriented to self and place.  Patient cannot tell me any of the medications he takes.  Family guidance crisis worker told our crisis worker that patient's 100-year-old sister-in-law is not able to care for patient.  Subsequently police was called and patient brought to the ED via EMS for further management.  In the ED, patient has no specific complaints.      History provided by:  Patient  History limited by:  Age  Medical Problem      Prior to Admission Medications   Prescriptions Last Dose Informant Patient Reported? Taking?   ALPRAZolam (XANAX) 0.5 mg tablet  Child Yes No   Si.5 mg daily at bedtime as needed for anxiety or sleep   Blood Pressure Monitor NILTON  Child No No   Sig: by Does not apply route daily   Semglee, yfgn, 100 UNIT/ML SOPN  Child Yes No   Sure Comfort Pen Needles 32G X 4 MM MISC   Yes No   ZINC OXIDE PO  Child Yes No   Sig: Take by mouth daily   allopurinol (ZYLOPRIM) 100 mg tablet  Child Yes No   Sig: Take 100 mg by mouth 2 (two) times a day   apixaban (Eliquis) 2.5 mg   No No   Sig: Take 1 tablet (2.5 mg total) by mouth 2 (two) times a day   ascorbic acid (VITAMIN C) 500 MG tablet  Child No No   Sig: Take 1 tablet (500 mg total) by mouth daily   atorvastatin (LIPITOR) 40 mg tablet  Child No No   Sig: Take 1 tablet (40 mg total) by mouth daily with  dinner   cholecalciferol (VITAMIN D3) 1,000 units tablet  Child No No   Sig: Take 2 tablets (2,000 Units total) by mouth daily Do not start before 2023.   docusate sodium (COLACE) 100 mg capsule  Child No No   Sig: Take 1 capsule (100 mg total) by mouth 2 (two) times a day as needed for constipation   ferrous sulfate 325 (65 Fe) mg tablet  Child Yes No   Sig: Take 325 mg by mouth daily with breakfast   glimepiride (AMARYL) 2 mg tablet  Child No No   Sig: Take 1 tablet (2 mg total) by mouth daily with dinner   halobetasol (ULTRAVATE) 0.05 % cream  Child Yes No   insulin aspart (NovoLOG FlexPen) 100 UNIT/ML injection pen   Yes No   insulin lispro (HumaLOG) 100 units/mL injection  Child No No   Sig: Inject 1-5 Units under the skin 3 (three) times a day before meals   insulin lispro (HumaLOG) 100 units/mL injection  Child No No   Sig: Inject 1-5 Units under the skin daily at bedtime   latanoprost (XALATAN) 0.005 % ophthalmic solution  Child Yes No   Si drop daily at bedtime   tamsulosin (FLOMAX) 0.4 mg  Child Yes No   Sig: Take 0.4 mg by mouth daily with dinner   timolol (TIMOPTIC) 0.5 % ophthalmic solution  Child No No   Sig: Administer 1 drop to both eyes daily   torsemide (DEMADEX) 20 mg tablet   Yes No      Facility-Administered Medications: None       Past Medical History:   Diagnosis Date    Atrial fibrillation (HCC)     Chronic kidney disease     Coronary artery disease     Diabetes mellitus (HCC)     Hyperlipidemia     Hypertension        Past Surgical History:   Procedure Laterality Date    CARDIAC CATHETERIZATION N/A 2023    Procedure: Cardiac pericardiocentesis;  Surgeon: Shanna Adame DO;  Location: [facility];  Service: Cardiology    CARDIAC CATHETERIZATION N/A 2023    Procedure: Cardiac RHC;  Surgeon: Shanna Adame DO;  Location: [facility];  Service: Cardiology    EYE SURGERY      IR CHEST TUBE PLACEMENT  2023    IR CHEST TUBE PLACEMENT  2023     IR PLEURAL EFFUSION LONG-TERM CATHETER PLACEMENT  8/7/2023    IR PLEURAL EFFUSION LONG-TERM CATHETER REMOVAL  1/3/2024    IR THORACENTESIS  12/11/2023    SKIN CANCER EXCISION      TONSILLECTOMY         Family History   Problem Relation Age of Onset    Heart disease Mother     Diabetes Father     Vision loss Father     Cancer Sister     Diabetes Sister      I have reviewed and agree with the history as documented.    E-Cigarette/Vaping    E-Cigarette Use Never User      E-Cigarette/Vaping Substances    Nicotine No     THC No     CBD No     Flavoring No     Other No     Unknown No      Social History     Tobacco Use    Smoking status: Never    Smokeless tobacco: Former    Tobacco comments:     Smoked a pipe 50 years ago   Vaping Use    Vaping status: Never Used   Substance Use Topics    Alcohol use: Not Currently     Alcohol/week: 0.0 standard drinks of alcohol     Comment: special occasions    Drug use: Not Currently       Review of Systems   Unable to perform ROS: Age       Physical Exam  Physical Exam  Vitals and nursing note reviewed.   Constitutional:       General: He is not in acute distress.     Appearance: He is well-developed.   HENT:      Head: Normocephalic and atraumatic.   Eyes:      Conjunctiva/sclera: Conjunctivae normal.   Cardiovascular:      Rate and Rhythm: Normal rate and regular rhythm.      Heart sounds: No murmur heard.  Pulmonary:      Effort: Pulmonary effort is normal. No respiratory distress.      Breath sounds: Normal breath sounds.   Abdominal:      Palpations: Abdomen is soft.      Tenderness: There is no abdominal tenderness.   Musculoskeletal:         General: No swelling.      Cervical back: Neck supple.   Skin:     General: Skin is warm and dry.      Capillary Refill: Capillary refill takes less than 2 seconds.   Neurological:      Mental Status: He is alert.   Psychiatric:         Mood and Affect: Mood normal.         Vital Signs  ED Triage Vitals [08/19/24 1835]   Temperature Pulse  Respirations Blood Pressure SpO2   97.5 °F (36.4 °C) 78 18 (!) 191/93 98 %      Temp src Heart Rate Source Patient Position - Orthostatic VS BP Location FiO2 (%)   -- -- -- -- --      Pain Score       No Pain           Vitals:    08/19/24 1835   BP: (!) 191/93   Pulse: 78         Visual Acuity      ED Medications  Medications   insulin lispro (HumALOG/ADMELOG) 100 units/mL subcutaneous injection 1-5 Units (has no administration in time range)   insulin lispro (HumALOG/ADMELOG) 100 units/mL subcutaneous injection 1-5 Units (has no administration in time range)       Diagnostic Studies  Results Reviewed       Procedure Component Value Units Date/Time    FLU/RSV/COVID - if FLU/RSV clinically relevant [[id number]]  (Normal) Collected: 08/19/24 1903    Lab Status: Final result Specimen: Nares from Nose Updated: 08/19/24 2009     SARS-CoV-2 Negative     INFLUENZA A PCR Negative     INFLUENZA B PCR Negative     RSV PCR Negative    Narrative:      This test has been performed using the CoV-2/Flu/RSV plus assay on the [employer] GeneXpert platform. This test has been validated by the  and verified by the performing laboratory.     This test is designed to amplify and detect the following: nucleocapsid (N), envelope (E), and RNA-dependent RNA polymerase (RdRP) genes of the SARS-CoV-2 genome; matrix (M), basic polymerase (PB2), and acidic protein (PA) segments of the influenza A genome; matrix (M) and non-structural protein (NS) segments of the influenza B genome, and the nucleocapsid genes of RSV A and RSV B.     Positive results are indicative of the presence of Flu A, Flu B, RSV, and/or SARS-CoV-2 RNA. Positive results for SARS-CoV-2 or suspected novel influenza should be reported to state, local, or federal health departments according to local reporting requirements.      All results should be assessed in conjunction with clinical presentation and other laboratory markers for clinical management.     FOR PEDIATRIC  PATIENTS - copy/paste COVID Guidelines URL to browser: [email or web address]       HS Troponin I 4hr [[id number]]     Lab Status: No result Specimen: Blood     TSH, 3rd generation with Free T4 reflex [[id number]]  (Normal) Collected: 08/19/24 1903    Lab Status: Final result Specimen: Blood from Arm, Left Updated: 08/19/24 1947     TSH 3RD GENERATON 3.500 uIU/mL     Lactic acid, plasma (w/reflex if result > 2.0) [[id number]]  (Normal) Collected: 08/19/24 1903    Lab Status: Final result Specimen: Blood from Arm, Left Updated: 08/19/24 1943     LACTIC ACID 1.1 mmol/L     Narrative:      Result may be elevated if tourniquet was used during collection.    HS Troponin 0hr (reflex protocol) [[id number]]  (Normal) Collected: 08/19/24 1903    Lab Status: Final result Specimen: Blood from Arm, Left Updated: 08/19/24 1937     hs TnI 0hr 15 ng/L     HS Troponin I 2hr [[id number]]     Lab Status: No result Specimen: Blood     B-Type Natriuretic Peptide(BNP) [[id number]]  (Abnormal) Collected: 08/19/24 1903    Lab Status: Final result Specimen: Blood from Arm, Left Updated: 08/19/24 1936      pg/mL     Comprehensive metabolic panel [[id number]]  (Abnormal) Collected: 08/19/24 1903    Lab Status: Final result Specimen: Blood from Arm, Left Updated: 08/19/24 1927     Sodium 134 mmol/L      Potassium 4.6 mmol/L      Chloride 102 mmol/L      CO2 27 mmol/L      ANION GAP 5 mmol/L      BUN 51 mg/dL      Creatinine 1.87 mg/dL      Glucose 164 mg/dL      Calcium 8.7 mg/dL      AST 16 U/L      ALT 15 U/L      Alkaline Phosphatase 146 U/L      Total Protein 6.1 g/dL      Albumin 3.5 g/dL      Total Bilirubin 0.84 mg/dL      eGFR 32 ml/min/1.73sq m     Narrative:      National Kidney Disease Foundation guidelines for Chronic Kidney Disease (CKD):     Stage 1 with normal or high GFR (GFR > 90 mL/min/1.73 square meters)    Stage 2 Mild CKD (GFR = 60-89 mL/min/1.73 square meters)    Stage  3A Moderate CKD (GFR = 45-59 mL/min/1.73 square meters)    Stage 3B Moderate CKD (GFR = 30-44 mL/min/1.73 square meters)    Stage 4 Severe CKD (GFR = 15-29 mL/min/1.73 square meters)    Stage 5 End Stage CKD (GFR <15 mL/min/1.73 square meters)  Note: GFR calculation is accurate only with a steady state creatinine    Lipase [[id number]]  (Normal) Collected: 08/19/24 1903    Lab Status: Final result Specimen: Blood from Arm, Left Updated: 08/19/24 1927     Lipase 16 u/L     Magnesium [[id number]]  (Normal) Collected: 08/19/24 1903    Lab Status: Final result Specimen: Blood from Arm, Left Updated: 08/19/24 1927     Magnesium 2.2 mg/dL     CBC and differential [[id number]]  (Abnormal) Collected: 08/19/24 1903    Lab Status: Final result Specimen: Blood from Arm, Left Updated: 08/19/24 1911     WBC 5.79 Thousand/uL      RBC 3.81 Million/uL      Hemoglobin 12.1 g/dL      Hematocrit 37.7 %      MCV 99 fL      MCH 31.8 pg      MCHC 32.1 g/dL      RDW 17.1 %      MPV 9.5 fL      Platelets 137 Thousands/uL      nRBC 0 /100 WBCs      Segmented % 62 %      Immature Grans % 0 %      Lymphocytes % 25 %      Monocytes % 11 %      Eosinophils Relative 2 %      Basophils Relative 0 %      Absolute Neutrophils 3.58 Thousands/µL      Absolute Immature Grans 0.01 Thousand/uL      Absolute Lymphocytes 1.47 Thousands/µL      Absolute Monocytes 0.62 Thousand/µL      Eosinophils Absolute 0.09 Thousand/µL      Basophils Absolute 0.02 Thousands/µL     UA w Reflex to Microscopic w Reflex to Culture [[id number]]     Lab Status: No result Specimen: Urine                    No orders to display              Procedures  ECG 12 Lead Documentation Only    Date/Time: 8/19/2024 7:49 PM    Performed by: Raúl Lee DO  Authorized by: Raúl Lee DO    Indications / Diagnosis:  Generalized weakness  ECG reviewed by me, the ED Provider: yes    Patient location:  ED  Previous ECG:     Previous ECG:  Compared to current    Similarity:  Changes  noted    Comparison to cardiac monitor: Yes    Interpretation:     Interpretation: abnormal    Comments:      Irregularly irregular rhythm, rate 86, normal axis, no acute ST/T wave evidence noted, occasional PVCs noted, atrial fibrillation is unchanged from previous study, PVCs are new.           ED Course                                 SBIRT 20yo+      Flowsheet Row Most Recent Value   Initial Alcohol Screen: US AUDIT-C     1. How often do you have a drink containing alcohol? 0 Filed at: 08/19/2024 1837   2. How many drinks containing alcohol do you have on a typical day you are drinking?  0 Filed at: 08/19/2024 1837   3a. Male UNDER 65: How often do you have five or more drinks on one occasion? 0 Filed at: 08/19/2024 1837   3b. FEMALE Any Age, or MALE 65+: How often do you have 4 or more drinks on one occassion? 0 Filed at: 08/19/2024 1837   Audit-C Score 0 Filed at: 08/19/2024 1837   ALEJANDRO: How many times in the past year have you...    Used an illegal drug or used a prescription medication for non-medical reasons? Never Filed at: 08/19/2024 1837                      Medical Decision Making  Obtain blood work, EKG, UA  Continue to monitor patient for any worsening symptoms  Plan for admission    Patient's blood work shows baseline CKD.  Patient has no other complaints in the ED.  EKG shows baseline atrial fibrillation.  I did talk with our crisis worker who received phone call from family guidance crisis worker who did a mobile out reach and found patient not deemed to be safe at his house without his son.  Patient's son is currently admitted for hypertension and suicide ideation.  At this time patient will be admitted as he is not able to stay home by himself and care for himself.  Patient agrees with admission plans.    Amount and/or Complexity of Data Reviewed  External Data Reviewed: ECG.  Labs: ordered. Decision-making details documented in ED Course.  ECG/medicine tests: ordered and independent  interpretation performed. Decision-making details documented in ED Course.                 Disposition  Final diagnoses:   Generalized weakness   CKD (chronic kidney disease)   Unable to care for self     Time reflects when diagnosis was documented in both MDM as applicable and the Disposition within this note       Time User Action Codes Description Comment    8/19/2024  8:20 PM Raúl Lee [R53.1] Generalized weakness     8/19/2024  8:20 PM Raúl Lee [N18.9] CKD (chronic kidney disease)     8/19/2024  8:20 PM Raúl Lee [Z78.9] Unable to care for self           ED Disposition       ED Disposition   Admit    Condition   Stable    Date/Time   Mon Aug 19, 2024 2019    Comment   Case was discussed with Dr. Hyman and the patient's admission status was agreed to be Admission Status: observation status to the service of Dr. Hyman.               Follow-up Information    None         Patient's Medications   Discharge Prescriptions    No medications on file       No discharge procedures on file.    PDMP Review       None            ED Provider  Electronically Signed by             Raúl Lee DO  08/19/24 2031

## 2024-08-20 NOTE — ASSESSMENT & PLAN NOTE
-Presenting to ED with /93, noncompliant with medication  -Renal function at baseline  -Start amlodipine and hydralazine for BP goal <140/80

## 2024-08-21 LAB
ANION GAP SERPL CALCULATED.3IONS-SCNC: 7 MMOL/L (ref 4–13)
BUN SERPL-MCNC: 47 MG/DL (ref 5–25)
CALCIUM SERPL-MCNC: 8.3 MG/DL (ref 8.4–10.2)
CHLORIDE SERPL-SCNC: 102 MMOL/L (ref 96–108)
CO2 SERPL-SCNC: 27 MMOL/L (ref 21–32)
CREAT SERPL-MCNC: 1.79 MG/DL (ref 0.6–1.3)
ERYTHROCYTE [DISTWIDTH] IN BLOOD BY AUTOMATED COUNT: 16.8 % (ref 11.6–15.1)
GFR SERPL CREATININE-BSD FRML MDRD: 33 ML/MIN/1.73SQ M
GLUCOSE SERPL-MCNC: 127 MG/DL (ref 65–140)
GLUCOSE SERPL-MCNC: 192 MG/DL (ref 65–140)
GLUCOSE SERPL-MCNC: 197 MG/DL (ref 65–140)
GLUCOSE SERPL-MCNC: 239 MG/DL (ref 65–140)
GLUCOSE SERPL-MCNC: 91 MG/DL (ref 65–140)
HCT VFR BLD AUTO: 37.3 % (ref 36.5–49.3)
HGB BLD-MCNC: 12.1 G/DL (ref 12–17)
MAGNESIUM SERPL-MCNC: 1.9 MG/DL (ref 1.9–2.7)
MCH RBC QN AUTO: 31.3 PG (ref 26.8–34.3)
MCHC RBC AUTO-ENTMCNC: 32.4 G/DL (ref 31.4–37.4)
MCV RBC AUTO: 97 FL (ref 82–98)
PLATELET # BLD AUTO: 143 THOUSANDS/UL (ref 149–390)
PMV BLD AUTO: 10.1 FL (ref 8.9–12.7)
POTASSIUM SERPL-SCNC: 3.7 MMOL/L (ref 3.5–5.3)
RBC # BLD AUTO: 3.86 MILLION/UL (ref 3.88–5.62)
SODIUM SERPL-SCNC: 136 MMOL/L (ref 135–147)
WBC # BLD AUTO: 5.56 THOUSAND/UL (ref 4.31–10.16)

## 2024-08-21 PROCEDURE — 99232 SBSQ HOSP IP/OBS MODERATE 35: CPT | Performed by: INTERNAL MEDICINE

## 2024-08-21 PROCEDURE — 83735 ASSAY OF MAGNESIUM: CPT

## 2024-08-21 PROCEDURE — 80048 BASIC METABOLIC PNL TOTAL CA: CPT

## 2024-08-21 PROCEDURE — 85027 COMPLETE CBC AUTOMATED: CPT

## 2024-08-21 PROCEDURE — 82948 REAGENT STRIP/BLOOD GLUCOSE: CPT

## 2024-08-21 RX ADMIN — TIMOLOL MALEATE 1 DROP: 5 SOLUTION OPHTHALMIC at 08:00

## 2024-08-21 RX ADMIN — TAMSULOSIN HYDROCHLORIDE 0.4 MG: 0.4 CAPSULE ORAL at 17:39

## 2024-08-21 RX ADMIN — ALLOPURINOL 100 MG: 100 TABLET ORAL at 17:39

## 2024-08-21 RX ADMIN — ALLOPURINOL 100 MG: 100 TABLET ORAL at 08:00

## 2024-08-21 RX ADMIN — INSULIN LISPRO 1 UNITS: 100 INJECTION, SOLUTION INTRAVENOUS; SUBCUTANEOUS at 17:39

## 2024-08-21 RX ADMIN — LATANOPROST 1 DROP: 50 SOLUTION OPHTHALMIC at 21:43

## 2024-08-21 RX ADMIN — INSULIN LISPRO 1 UNITS: 100 INJECTION, SOLUTION INTRAVENOUS; SUBCUTANEOUS at 21:44

## 2024-08-21 RX ADMIN — APIXABAN 2.5 MG: 2.5 TABLET, FILM COATED ORAL at 17:39

## 2024-08-21 RX ADMIN — ATORVASTATIN CALCIUM 40 MG: 40 TABLET, FILM COATED ORAL at 17:39

## 2024-08-21 RX ADMIN — HYDRALAZINE HYDROCHLORIDE 25 MG: 25 TABLET ORAL at 14:09

## 2024-08-21 RX ADMIN — APIXABAN 2.5 MG: 2.5 TABLET, FILM COATED ORAL at 08:00

## 2024-08-21 RX ADMIN — FERROUS SULFATE TAB 325 MG (65 MG ELEMENTAL FE) 325 MG: 325 (65 FE) TAB at 07:57

## 2024-08-21 RX ADMIN — HYDRALAZINE HYDROCHLORIDE 25 MG: 25 TABLET ORAL at 06:03

## 2024-08-21 RX ADMIN — INSULIN LISPRO 2 UNITS: 100 INJECTION, SOLUTION INTRAVENOUS; SUBCUTANEOUS at 11:37

## 2024-08-21 RX ADMIN — HYDRALAZINE HYDROCHLORIDE 25 MG: 25 TABLET ORAL at 21:43

## 2024-08-21 RX ADMIN — TORSEMIDE 20 MG: 20 TABLET ORAL at 08:00

## 2024-08-21 NOTE — PLAN OF CARE
Problem: Prexisting or High Potential for Compromised Skin Integrity  Goal: Skin integrity is maintained or improved  Description: INTERVENTIONS:  - Identify patients at risk for skin breakdown  - Assess and monitor skin integrity  - Assess and monitor nutrition and hydration status  - Monitor labs   - Assess for incontinence   - Turn and reposition patient  - Assist with mobility/ambulation  - Relieve pressure over bony prominences  - Avoid friction and shearing  - Provide appropriate hygiene as needed including keeping skin clean and dry  - Evaluate need for skin moisturizer/barrier cream  - Collaborate with interdisciplinary team   - Patient/family teaching  - Consider wound care consult   Outcome: Progressing     Problem: SAFETY ADULT  Goal: Patient will remain free of falls  Description: INTERVENTIONS:  - Educate patient/family on patient safety including physical limitations  - Instruct patient to call for assistance with activity   - Consult OT/PT to assist with strengthening/mobility   - Keep Call bell within reach  - Keep bed low and locked with side rails adjusted as appropriate  - Keep care items and personal belongings within reach  - Initiate and maintain comfort rounds  - Make Fall Risk Sign visible to staff  - Offer Toileting every 2 Hours, in advance of need  - Initiate/Maintain bed 4alarm  - Apply yellow socks and bracelet for high fall risk patients  - Consider moving patient to room near nurses station  Outcome: Progressing     Problem: DISCHARGE PLANNING  Goal: Discharge to home or other facility with appropriate resources  Description: INTERVENTIONS:  - Identify barriers to discharge w/patient and caregiver  - Arrange for needed discharge resources and transportation as appropriate  - Identify discharge learning needs (meds, wound care, etc.)  - Arrange for interpretive services to assist at discharge as needed  - Refer to Case Management Department for coordinating discharge planning if the  patient needs post-hospital services based on physician/advanced practitioner order or complex needs related to functional status, cognitive ability, or social support system  Outcome: Progressing

## 2024-08-21 NOTE — PROGRESS NOTES
[facility]  Progress Note  Name: Yao Tipton I  MRN: [id number]  Unit/Bed#: 2 [facility] [address] B I Date of Admission: 8/19/2024   Date of Service: 8/21/2024 I Hospital Day: 1    Assessment & Plan   * Unable to care for self  Assessment & Plan  As per records, lives with 100 year old sister in law and son. Patient's son is currently hospitalized  Noncompliant with medication and Aox2 (self, place)  Fall precautions  PT/OT evaluation  CM working on placement    Gout  Assessment & Plan  Continue allopurinol    BPH (benign prostatic hyperplasia)  Assessment & Plan  Continue tamsulosin    Dyslipidemia  Assessment & Plan  Continue atorvastatin nightly    Chronic combined systolic and diastolic CHF (congestive heart failure) (HCC)  Assessment & Plan  No evidence of decompensation  Echo (7/6/23): EF 60%  Continue torsemide    Chronic anemia  Assessment & Plan  Hgb at baseline, no active bleeding  Continue ferrous sulfate    Benign hypertension with CKD (chronic kidney disease) stage IV (HCC)  Assessment & Plan  BP stable today  Continue home torsemide and hydralazine  Monitor vitals per routine    Chronic atrial fibrillation (HCC)  Assessment & Plan  Rate controlled  AC with Eliquis    Diabetes mellitus with peripheral artery disease (HCC)  Assessment & Plan  Previous A1c 8.8  Hold home anti glycemic medication  Continue SSI with glucose checks  Hypoglycemia protocol             VTE Pharmacologic Prophylaxis: VTE Score: 4 Moderate Risk (Score 3-4) - Pharmacological DVT Prophylaxis Ordered: apixaban (Eliquis).    Mobility:   Basic Mobility Inpatient Raw Score: 18  JH-HLM Goal: 6: Walk 10 steps or more  JH-HLM Achieved: 4: Move to chair/commode  JH-HLM Goal achieved. Continue to encourage appropriate mobility.    Patient Centered Rounds: I performed bedside rounds with nursing staff today.   Discussions with Specialists or Other Care Team Provider: Nursing,CM    Education and Discussions with Family /  Patient:  Patient's son currently hospitalized.     Total Time Spent on Date of Encounter in care of patient: 45 mins. This time was spent on one or more of the following: performing physical exam; counseling and coordination of care; obtaining or reviewing history; documenting in the medical record; reviewing/ordering tests, medications or procedures; communicating with other healthcare professionals and discussing with patient's family/caregivers.    Current Length of Stay: 1 day(s)  Current Patient Status: Inpatient   Certification Statement: The patient will continue to require additional inpatient hospital stay due to placement  Discharge Plan: Anticipate discharge in 24-48 hrs to discharge location to be determined pending rehab evaluations.    Code Status: Level 3 - DNAR and DNI    Subjective:   Patient sitting upright in bed this morning. In no acute distress. Denies any complaints at this time.     Objective:     Vitals:   Temp (24hrs), Av.3 °F (36.3 °C), Min:97.2 °F (36.2 °C), Max:97.4 °F (36.3 °C)    Temp:  [97.2 °F (36.2 °C)-97.4 °F (36.3 °C)] 97.4 °F (36.3 °C)  HR:  [71-94] 71  Resp:  [16-20] 20  BP: (107-149)/(61-87) 134/73  SpO2:  [95 %-98 %] 98 %  Body mass index is 23.21 kg/m².     Input and Output Summary (last 24 hours):   No intake or output data in the 24 hours ending 24 1449    Physical Exam:   Physical Exam  Vitals and nursing note reviewed.   Constitutional:       General: He is not in acute distress.     Appearance: He is well-developed.   HENT:      Head: Normocephalic and atraumatic.   Eyes:      Conjunctiva/sclera: Conjunctivae normal.   Cardiovascular:      Rate and Rhythm: Normal rate and regular rhythm.      Heart sounds: No murmur heard.  Pulmonary:      Effort: Pulmonary effort is normal. No respiratory distress.      Breath sounds: Normal breath sounds.   Abdominal:      Palpations: Abdomen is soft.      Tenderness: There is no abdominal tenderness.   Musculoskeletal:          General: No swelling.      Cervical back: Neck supple.   Skin:     General: Skin is warm and dry.      Capillary Refill: Capillary refill takes less than 2 seconds.   Neurological:      Mental Status: He is alert. Mental status is at baseline.   Psychiatric:         Mood and Affect: Mood normal.          Additional Data:     Labs:  Results from last 7 days   Lab Units 08/21/24  0441 08/20/24  0448   WBC Thousand/uL 5.56 5.01   HEMOGLOBIN g/dL 12.1 12.1   HEMATOCRIT % 37.3 37.2   PLATELETS Thousands/uL 143* 135*   SEGS PCT %  --  74   LYMPHO PCT %  --  13*   MONO PCT %  --  11   EOS PCT %  --  2     Results from last 7 days   Lab Units 08/21/24  0441 08/20/24  0448   SODIUM mmol/L 136 138   POTASSIUM mmol/L 3.7 3.5   CHLORIDE mmol/L 102 103   CO2 mmol/L 27 26   BUN mg/dL 47* 46*   CREATININE mg/dL 1.79* 1.66*   ANION GAP mmol/L 7 9   CALCIUM mg/dL 8.3* 8.6   ALBUMIN g/dL  --  3.3*   TOTAL BILIRUBIN mg/dL  --  1.00   ALK PHOS U/L  --  128*   ALT U/L  --  13   AST U/L  --  13   GLUCOSE RANDOM mg/dL 91 120         Results from last 7 days   Lab Units 08/21/24  1134 08/21/24  0730 08/20/24  2005 08/20/24  1612 08/20/24  1059 08/20/24  0706 08/19/24  2225   POC GLUCOSE mg/dl 239* 127 218* 208* 207* 140 156*     Results from last 7 days   Lab Units 08/19/24  1903   HEMOGLOBIN A1C % 7.2*     Results from last 7 days   Lab Units 08/19/24  1903   LACTIC ACID mmol/L 1.1       Lines/Drains:  Invasive Devices       Peripheral Intravenous Line  Duration             Peripheral IV 08/19/24 Distal;Left;Ventral (anterior) Forearm 1 day                          Imaging: No pertinent imaging reviewed.    Recent Cultures (last 7 days):         Last 24 Hours Medication List:   Current Facility-Administered Medications   Medication Dose Route Frequency Provider Last Rate    acetaminophen  650 mg Oral Q6H PRN Nba Hyman MD      allopurinol  100 mg Oral BID Nba Hyman MD      ALPRAZolam  0.5 mg Oral HS PRN Nba Hyman  MD      apixaban  2.5 mg Oral BID Nba Hyman MD      atorvastatin  40 mg Oral Daily With Dinner Nba Hyman MD      docusate sodium  100 mg Oral BID PRN Nba Hyman MD      ferrous sulfate  325 mg Oral Daily With Breakfast Nba Hyman MD      hydrALAZINE  25 mg Oral Q8H RASHAD Nba Hyman MD      insulin lispro  1-5 Units Subcutaneous TID AC Nba Hyman MD      insulin lispro  1-5 Units Subcutaneous HS Nba Hyman MD      latanoprost  1 drop Both Eyes HS Nba Hyman MD      tamsulosin  0.4 mg Oral Daily With Dinner Nba Hyman MD      timolol  1 drop Both Eyes Daily Nba Hyman MD      torsemide  20 mg Oral Daily Nba Hyman MD          Today, Patient Was Seen By: Marie De La O PA-C    **Please Note: This note may have been constructed using a voice recognition system.**

## 2024-08-21 NOTE — ASSESSMENT & PLAN NOTE
As per records, lives with 100 year old sister in law and son. Patient's son is currently hospitalized  Noncompliant with medication and Aox2 (self, place)  Fall precautions  PT/OT evaluation  CM working on placement

## 2024-08-21 NOTE — ASSESSMENT & PLAN NOTE
Previous A1c 8.8  Hold home anti glycemic medication  Continue SSI with glucose checks  Hypoglycemia protocol

## 2024-08-21 NOTE — CASE MANAGEMENT
Case Management Assessment & Discharge Planning Note    Patient name Yao Tipton  Location 2 South /2 South 204 B MRN [id number]  : 1940 Date 2024       Current Admission Date: 2024  Current Admission Diagnosis:Unable to care for self   Patient Active Problem List    Diagnosis Date Noted Date Diagnosed    Unable to care for self 2024     Gout 2024     Left ankle pain 2023     Chronic combined systolic and diastolic CHF (congestive heart failure) ([facility]) 2023     Dyslipidemia 2023     BPH (benign prostatic hyperplasia) 2023     Cellulitis of left lower extremity 2023     Constipation 08/10/2023     Pleural effusion exudative 2023     Goals of care, counseling/discussion 2023     Duodenal ulcer 07/10/2023     Encephalopathy 2023     Recent empyema of lung with persistent locuted R hydropneumothorax 2023     Hypoglycemia 2023     Thrombocytopenia ([facility]) 2023     Diarrhea 2023     Hypothermia 2023     Septic shock ([facility]) 2023     Urinary retention 2023     Macrocytosis 2023     Chronic anemia 2023     Chronic kidney disease-mineral and bone disorder 2023     Advanced care planning/counseling discussion 2023     Benign hypertension with CKD (chronic kidney disease) stage IV ([facility]) 2022     Persistent proteinuria 2022     COVID-19 2022     Electrolyte abnormality 2022     Cellulitis of left upper extremity 2021     Chronic atrial fibrillation ([facility]) 2021     Vitamin D deficiency 10/18/2019     Chronic kidney disease, stage 4 (severe) ([facility]) 2019     Acute on chronic combined systolic and diastolic congestive heart failure ([facility]) 2019     Recent pericardial effusion 2019     Leg edema 01/10/2019     Diabetes mellitus with peripheral artery disease ([facility]) 01/10/2019     PVC (premature ventricular contraction) 2018      Essential hypertension 12/19/2018     Closed traumatic displaced fracture of distal end of left radius with malunion 02/09/2018       LOS (days): 1  Geometric Mean LOS (GMLOS) (days): 1.8  Days to GMLOS:0.7     OBJECTIVE:    Risk of Unplanned Readmission Score: 22.61     Current admission status: Inpatient  Referral Reason: Other (Social issues)    Preferred Pharmacy:   [id number] [place] [place] [facility] [address]  [facility]  [address] [place] [address]  Phone: [phone] Fax: [phone]    Primary Care Provider: Nicho Baltazar DO    Primary Insurance: MEDICARE  Secondary Insurance: BLUE CROSS    ASSESSMENT:  Active Health Care Proxies    There are no active Health Care Proxies on file.       Advance Directives  Does patient have a Health Care POA?: Yes  Does patient have Advance Directives?: Yes  Advance Directives: Power of  for health care, Power of  for finance  Primary Contact: González Tipton    Readmission Root Cause  30 Day Readmission: No    Patient Information  Admitted from:: Home  Mental Status: Alert  During Assessment patient was accompanied by: Not accompanied during assessment  Assessment information provided by:: Patient  Primary Caregiver: Family  Caregiver's Name:: González Tipton  Caregiver's Relationship to Patient:: Family Member (son)  Caregiver's Telephone Number:: [phone]  Support Systems: Son, Family members  County of Residence: [place]  What [facility] do you live in?: [place]  Home entry access options. Select all that apply.: No steps to enter home  Type of Current Residence: Other (Comment) (one level)  Living Arrangements: Lives w/ Son (and older sister-in-law)    Activities of Daily Living Prior to Admission  Functional Status: Assistance  Completes ADLs independently?: No  Level of ADL dependence: Assistance  Ambulates independently?: Yes  Does patient use assisted devices?: Yes  Assisted Devices (DME) used: Walker, Wheelchair  Does  patient currently own DME?: Yes  What DME does the patient currently own?: Walker, Wheelchair  Does patient have a history of Outpatient Therapy (PT/OT)?: No  Does the patient have a history of Short-Term Rehab?: Yes  Does patient have a history of HHC?: Yes ([facility])  Does patient currently have HHC?: No    Patient Information Continued  Income Source: Pension/[employer]  Does patient have prescription coverage?: Yes  Does patient receive dialysis treatments?: No    Means of Transportation  Means of Transport to Appts:: Drives Self      Social Determinants of Health (SDOH)      Flowsheet Row Most Recent Value   Housing Stability    In the last 12 months, was there a time when you were not able to pay the mortgage or rent on time? Pt Unable   In the past 12 months, how many times have you moved where you were living? 0   At any time in the past 12 months, were you homeless or living in a shelter (including now)? N   Transportation Needs    In the past 12 months, has lack of transportation kept you from medical appointments or from getting medications? no   In the past 12 months, has lack of transportation kept you from meetings, work, or from getting things needed for daily living? No   Food Insecurity    Within the past 12 months, you worried that your food would run out before you got the money to buy more. Never true   Within the past 12 months, the food you bought just didn't last and you didn't have money to get more. Never true   Utilities    In the past 12 months has the electric, gas, oil, or water company threatened to shut off services in your home? No            DISCHARGE DETAILS:    Discharge planning discussed with:: Patient  Freedom of Choice: Yes  Comments - Freedom of Choice: SW following to assist with planning.  Pt lives at home with his son and sister-in-law. Pt's son is pt's caregiver and assists pt with medications and other care needs. Pt's son had  to be hospitalized for medical and mental health issues and the agency that was assisting pt's son felt that pt could not remain home without son as his caregiver. Facility placement is being recommended for pt until son is able to return home and resume care.  SW met with pt to assess needs and discuss plans.  Pt is aware that his son is in hospital and that nursing facility placement is being recommended until son's condition improves.  Pt said he understands however his preference would be to return home if possible.  Pt's sister-in-law is at home however it is felt that although she can manage herself she would not be able to assist pt as needed.  [facility] was contacted by outside agency when pt was admitted.  Calls were placed to agency yesterday and today requesting to speak with APS , Teresa oLuis.  Ms. Louis was out of office doing visits today, anticipating call back tomorrow.  SW will be inquiring about coverage options for pt's placement, ie: emergency respite, when able to talk with [facility].  SW will continue to follow to assist with planning as needed.  CM contacted family/caregiver?: No- see comments (son currently hospitalized for medical and mental health issues)  Were Treatment Team discharge recommendations reviewed with patient/caregiver?: Yes    Other Referral/Resources/Interventions Provided:  Interventions: Other (Specify) ([facility])  Referral Comments: Message left with office staff at [facility] requesting call back from Teresa Louis APS .    Treatment Team Recommendation: SNF (until son returns home)  Discharge Destination Plan::  (pending)

## 2024-08-22 LAB
GLUCOSE SERPL-MCNC: 114 MG/DL (ref 65–140)
GLUCOSE SERPL-MCNC: 152 MG/DL (ref 65–140)
GLUCOSE SERPL-MCNC: 184 MG/DL (ref 65–140)
GLUCOSE SERPL-MCNC: 220 MG/DL (ref 65–140)
GLUCOSE SERPL-MCNC: 65 MG/DL (ref 65–140)

## 2024-08-22 PROCEDURE — 82948 REAGENT STRIP/BLOOD GLUCOSE: CPT

## 2024-08-22 PROCEDURE — 99232 SBSQ HOSP IP/OBS MODERATE 35: CPT | Performed by: INTERNAL MEDICINE

## 2024-08-22 RX ADMIN — INSULIN LISPRO 1 UNITS: 100 INJECTION, SOLUTION INTRAVENOUS; SUBCUTANEOUS at 07:39

## 2024-08-22 RX ADMIN — TORSEMIDE 20 MG: 20 TABLET ORAL at 09:10

## 2024-08-22 RX ADMIN — TIMOLOL MALEATE 1 DROP: 5 SOLUTION OPHTHALMIC at 09:11

## 2024-08-22 RX ADMIN — ATORVASTATIN CALCIUM 40 MG: 40 TABLET, FILM COATED ORAL at 16:25

## 2024-08-22 RX ADMIN — APIXABAN 2.5 MG: 2.5 TABLET, FILM COATED ORAL at 17:48

## 2024-08-22 RX ADMIN — INSULIN LISPRO 1 UNITS: 100 INJECTION, SOLUTION INTRAVENOUS; SUBCUTANEOUS at 16:25

## 2024-08-22 RX ADMIN — INSULIN LISPRO 2 UNITS: 100 INJECTION, SOLUTION INTRAVENOUS; SUBCUTANEOUS at 12:36

## 2024-08-22 RX ADMIN — ALLOPURINOL 100 MG: 100 TABLET ORAL at 17:48

## 2024-08-22 RX ADMIN — HYDRALAZINE HYDROCHLORIDE 25 MG: 25 TABLET ORAL at 14:40

## 2024-08-22 RX ADMIN — HYDRALAZINE HYDROCHLORIDE 25 MG: 25 TABLET ORAL at 21:27

## 2024-08-22 RX ADMIN — APIXABAN 2.5 MG: 2.5 TABLET, FILM COATED ORAL at 09:10

## 2024-08-22 RX ADMIN — TAMSULOSIN HYDROCHLORIDE 0.4 MG: 0.4 CAPSULE ORAL at 16:25

## 2024-08-22 RX ADMIN — LATANOPROST 1 DROP: 50 SOLUTION OPHTHALMIC at 21:27

## 2024-08-22 RX ADMIN — ALLOPURINOL 100 MG: 100 TABLET ORAL at 09:10

## 2024-08-22 RX ADMIN — HYDRALAZINE HYDROCHLORIDE 25 MG: 25 TABLET ORAL at 05:27

## 2024-08-22 RX ADMIN — FERROUS SULFATE TAB 325 MG (65 MG ELEMENTAL FE) 325 MG: 325 (65 FE) TAB at 07:39

## 2024-08-22 NOTE — CASE MANAGEMENT
Case Management Discharge Planning Note    Patient name Yao Tipton  Location 2 South 2 South 204 B MRN [id number]  : 1940 Date 2024       Current Admission Date: 2024  Current Admission Diagnosis:Unable to care for self   Patient Active Problem List    Diagnosis Date Noted Date Diagnosed    Unable to care for self 2024     Gout 2024     Left ankle pain 2023     Chronic combined systolic and diastolic CHF (congestive heart failure) ([facility]) 2023     Dyslipidemia 2023     BPH (benign prostatic hyperplasia) 2023     Cellulitis of left lower extremity 2023     Constipation 08/10/2023     Pleural effusion exudative 2023     Goals of care, counseling/discussion 2023     Duodenal ulcer 07/10/2023     Encephalopathy 2023     Recent empyema of lung with persistent locuted R hydropneumothorax 2023     Hypoglycemia 2023     Thrombocytopenia ([facility]) 2023     Diarrhea 2023     Hypothermia 2023     Septic shock ([facility]) 2023     Urinary retention 2023     Macrocytosis 2023     Chronic anemia 2023     Chronic kidney disease-mineral and bone disorder 2023     Advanced care planning/counseling discussion 2023     Benign hypertension with CKD (chronic kidney disease) stage IV ([facility]) 2022     Persistent proteinuria 2022     COVID-19 2022     Electrolyte abnormality 2022     Cellulitis of left upper extremity 2021     Chronic atrial fibrillation ([facility]) 2021     Vitamin D deficiency 10/18/2019     Chronic kidney disease, stage 4 (severe) ([facility]) 2019     Acute on chronic combined systolic and diastolic congestive heart failure ([facility]) 2019     Recent pericardial effusion 2019     Leg edema 01/10/2019     Diabetes mellitus with peripheral artery disease ([facility]) 01/10/2019     PVC (premature ventricular contraction) 2018     Essential  hypertension 12/19/2018     Closed traumatic displaced fracture of distal end of left radius with malunion 02/09/2018       LOS (days): 2  Geometric Mean LOS (GMLOS) (days): 1.8  Days to GMLOS:-0.3     OBJECTIVE:  Risk of Unplanned Readmission Score: 22.92     Current admission status: Inpatient   [facility]acy:   [id number] - [place] [place] [facility] [address]  [facility]  [address] [place] [address]  Phone: [phone] Fax: [phone]    Primary Care Provider: Nicho Baltazar DO    Primary Insurance: MEDICARE  Secondary Insurance: BLUE CROSS    DISCHARGE DETAILS:    Discharge planning discussed with:: Patient, son (González Tipton) and sister-in-law (Gisselle Edwards)  Freedom of Choice: Yes  Comments - Freedom of Choice: SW following to assist with planning.  Respite nursing facility placement is being recommended due to caregiver absence.  Pt's son is caregiver and at this time son is in hospital and inpatient psychiatric care may be needed. SW received call from Teresa Louis from [facility]/APS to discuss case.  Per Ms. Louis based on her interaction with pt and son in July pt had resources and income that exceeded eligibility limits for most programs which means statewide respite program would not be an option.  Therefore placement cost would be out of pocket.  SW spoke with pt's sister-in-law to discuss care needs that are not able to be managed while son is absent. Per sister-in-law son assists pt with not only sliding scale insulin but also glucometer use.  Sister-in-law who age was confirmed as 80 said she would not be able to assist pt with that.  SW spoke with pt about plans.  Pt once again expressed preference to return home but SW talked with pt about safety concerns while son is not present.  Pt agreed that being home without assistance would not be possible and placement would be better.  With regard to financed and cost pt said SW would need to  speak to his son.  With the assistance if Zhang Genao, PES worker, SW met with pt's son in room to discuss situation.  SW talked with son about pt's need for placement to ensure pt's care needs are met while he is also getting the care he needs. After lengthy discussion son was able to consider placement for pt despite out of pocket cost.  SW made nursing facility/respite stay referrals and obtained respite stay rates. SW provided son with facility list and reviewed rate information.  After consider facility options son requested placement at [facility] so pt's PCP (Dr. Baltazar) can follow pt and rate was acceptable.  Facility will be reserved.  SW assisted son in contacting his aunt at home to arrange delivery of checkbook so son can provide payment as required by facility.  Plan is for payment upfront for two week stay.  Per son a friend of his is going to bring checkbook to him.  SW offered ongoing support and assistance to pt and son.  SW will follow to monitor progress and assist as needed.  CM contacted family/caregiver?: Yes  Were Treatment Team discharge recommendations reviewed with patient/caregiver?: Yes  Did patient/caregiver verbalize understanding of patient care needs?: Yes  Were patient/caregiver advised of the risks associated with not following Treatment Team discharge recommendations?: Yes    Contacts  Patient Contacts: González Tipton  Relationship to Patient:: Family (son)  Contact Method: In Person  Reason/Outcome: Discharge Planning    Requested Home Health Care         Is the patient interested in HHC at discharge?: No    DME Referral Provided  Referral made for DME?: No    Other Referral/Resources/Interventions Provided:  Interventions: SNF (Respite stay)    Treatment Team Recommendation: SNF (until son returns home)  Discharge Destination Plan:: SNF (until son returns home)  Transport at Discharge : Wheelchair van

## 2024-08-22 NOTE — PROGRESS NOTES
Patient:  HATTIE LAWTON    MRN:  [id number]    Aidin Request ID:  [id number]    Level of care reserved:  Skilled Nursing Facility    Partner Reserved:  [place] [phone]    Clinical needs requested:    Geography searched:  25 miles around [id number]    Start of Service:    Request sent:  1:46pm EDT on 8/22/2024 by Shakila Wood    Partner reserved:  6:04pm EDT on 8/22/2024 by Shakila Wood    Choice list shared:  2:33pm EDT on 8/22/2024 by Shakila Wood

## 2024-08-22 NOTE — PLAN OF CARE
Problem: Prexisting or High Potential for Compromised Skin Integrity  Goal: Skin integrity is maintained or improved  Description: INTERVENTIONS:  - Identify patients at risk for skin breakdown  - Assess and monitor skin integrity  - Assess and monitor nutrition and hydration status  - Monitor labs   - Assess for incontinence   - Turn and reposition patient  - Assist with mobility/ambulation  - Relieve pressure over bony prominences  - Avoid friction and shearing  - Provide appropriate hygiene as needed including keeping skin clean and dry  - Evaluate need for skin moisturizer/barrier cream  - Collaborate with interdisciplinary team   - Patient/family teaching  - Consider wound care consult   Outcome: Progressing     Problem: SAFETY ADULT  Goal: Patient will remain free of falls  Description: INTERVENTIONS:  - Educate patient/family on patient safety including physical limitations  - Instruct patient to call for assistance with activity   - Consult OT/PT to assist with strengthening/mobility   - Keep Call bell within reach  - Keep bed low and locked with side rails adjusted as appropriate  - Keep care items and personal belongings within reach  - Initiate and maintain comfort rounds  - Make Fall Risk Sign visible to staff  - Offer Toileting every 3 Hours, in advance of need  - Initiate/Maintain BED alarm  -- Apply yellow socks and bracelet for high fall risk patients  - Consider moving patient to room near nurses station  Outcome: Progressing     Problem: DISCHARGE PLANNING  Goal: Discharge to home or other facility with appropriate resources  Description: INTERVENTIONS:  - Identify barriers to discharge w/patient and caregiver  - Arrange for needed discharge resources and transportation as appropriate  - Identify discharge learning needs (meds, wound care, etc.)  - Arrange for interpretive services to assist at discharge as needed  - Refer to Case Management Department for coordinating discharge planning if the  patient needs post-hospital services based on physician/advanced practitioner order or complex needs related to functional status, cognitive ability, or social support system  Outcome: Progressing

## 2024-08-22 NOTE — PROGRESS NOTES
[facility]  Progress Note  Name: Yao Tipton I  MRN: [id number]  Unit/Bed#: 2 [facility] 204 B I Date of Admission: 8/19/2024   Date of Service: 8/22/2024 I Hospital Day: 2    Assessment & Plan   * Unable to care for self  Assessment & Plan  As per records, lives with 100 year old sister in law and son. Patient's son is currently hospitalized  Noncompliant with medication and Aox2 (self, place)  Fall precautions  PT/OT evaluation  CM working on placement    Gout  Assessment & Plan  Continue allopurinol    BPH (benign prostatic hyperplasia)  Assessment & Plan  Continue tamsulosin    Dyslipidemia  Assessment & Plan  Continue atorvastatin nightly    Chronic combined systolic and diastolic CHF (congestive heart failure) (HCC)  Assessment & Plan  No evidence of decompensation  Echo (7/6/23): EF 60%  Continue torsemide    Chronic anemia  Assessment & Plan  Hgb at baseline, no active bleeding  Continue ferrous sulfate    Benign hypertension with CKD (chronic kidney disease) stage IV (HCC)  Assessment & Plan  BP stable today  Continue home torsemide and hydralazine  Monitor vitals per routine    Chronic atrial fibrillation (HCC)  Assessment & Plan  Rate controlled  AC with Eliquis    Diabetes mellitus with peripheral artery disease (HCC)  Assessment & Plan  Previous A1c 8.8  Hold home anti glycemic medication  Continue SSI with glucose checks  Hypoglycemia protocol             VTE Pharmacologic Prophylaxis: VTE Score: 4 Moderate Risk (Score 3-4) - Pharmacological DVT Prophylaxis Ordered: apixaban (Eliquis).    Mobility:   Basic Mobility Inpatient Raw Score: 18  JH-HLM Goal: 6: Walk 10 steps or more  JH-HLM Achieved: 4: Move to chair/commode  JH-HLM Goal achieved. Continue to encourage appropriate mobility.    Patient Centered Rounds: I performed bedside rounds with nursing staff today.   Discussions with Specialists or Other Care Team Provider: Nursing, CM    Education and Discussions with Family /  Patient: Updated  (sister) via phone.    Total Time Spent on Date of Encounter in care of patient: 30 mins. This time was spent on one or more of the following: performing physical exam; counseling and coordination of care; obtaining or reviewing history; documenting in the medical record; reviewing/ordering tests, medications or procedures; communicating with other healthcare professionals and discussing with patient's family/caregivers.    Current Length of Stay: 2 day(s)  Current Patient Status: Inpatient   Certification Statement: The patient will continue to require additional inpatient hospital stay due to placement  Discharge Plan:  TBD    Code Status: Level 3 - DNAR and DNI    Subjective:   Patient sitting upright in bed today eating breakfast. Denies any acute complaints.     Objective:     Vitals:   Temp (24hrs), Av.2 °F (36.2 °C), Min:97.1 °F (36.2 °C), Max:97.2 °F (36.2 °C)    Temp:  [97.1 °F (36.2 °C)-97.2 °F (36.2 °C)] 97.2 °F (36.2 °C)  HR:  [71-93] 86  Resp:  [18] 18  BP: (112-137)/(67-86) 116/71  SpO2:  [96 %-98 %] 98 %  Body mass index is 23.21 kg/m².     Input and Output Summary (last 24 hours):     Intake/Output Summary (Last 24 hours) at 2024 1150  Last data filed at 2024 0754  Gross per 24 hour   Intake --   Output 675 ml   Net -675 ml       Physical Exam:   Physical Exam  Vitals and nursing note reviewed.   Constitutional:       General: He is not in acute distress.     Appearance: He is well-developed.   HENT:      Head: Normocephalic and atraumatic.   Eyes:      Conjunctiva/sclera: Conjunctivae normal.   Cardiovascular:      Rate and Rhythm: Normal rate and regular rhythm.      Heart sounds: No murmur heard.  Pulmonary:      Effort: Pulmonary effort is normal. No respiratory distress.      Breath sounds: Normal breath sounds.   Abdominal:      Palpations: Abdomen is soft.      Tenderness: There is no abdominal tenderness.   Musculoskeletal:         General: No  swelling.      Cervical back: Neck supple.   Skin:     General: Skin is warm and dry.      Capillary Refill: Capillary refill takes less than 2 seconds.   Neurological:      Mental Status: He is alert. Mental status is at baseline.   Psychiatric:         Mood and Affect: Mood normal.          Additional Data:     Labs:  Results from last 7 days   Lab Units 08/21/24  0441 08/20/24  0448   WBC Thousand/uL 5.56 5.01   HEMOGLOBIN g/dL 12.1 12.1   HEMATOCRIT % 37.3 37.2   PLATELETS Thousands/uL 143* 135*   SEGS PCT %  --  74   LYMPHO PCT %  --  13*   MONO PCT %  --  11   EOS PCT %  --  2     Results from last 7 days   Lab Units 08/21/24  0441 08/20/24  0448   SODIUM mmol/L 136 138   POTASSIUM mmol/L 3.7 3.5   CHLORIDE mmol/L 102 103   CO2 mmol/L 27 26   BUN mg/dL 47* 46*   CREATININE mg/dL 1.79* 1.66*   ANION GAP mmol/L 7 9   CALCIUM mg/dL 8.3* 8.6   ALBUMIN g/dL  --  3.3*   TOTAL BILIRUBIN mg/dL  --  1.00   ALK PHOS U/L  --  128*   ALT U/L  --  13   AST U/L  --  13   GLUCOSE RANDOM mg/dL 91 120         Results from last 7 days   Lab Units 08/22/24  1116 08/22/24  0731 08/21/24  2122 08/21/24  1614 08/21/24  1134 08/21/24  0730 08/20/24  2005 08/20/24  1612 08/20/24  1059 08/20/24  0706 08/19/24  2225   POC GLUCOSE mg/dl 220* 184* 197* 192* 239* 127 218* 208* 207* 140 156*     Results from last 7 days   Lab Units 08/19/24  1903   HEMOGLOBIN A1C % 7.2*     Results from last 7 days   Lab Units 08/19/24  1903   LACTIC ACID mmol/L 1.1       Lines/Drains:  Invasive Devices       Peripheral Intravenous Line  Duration             Peripheral IV 08/19/24 Distal;Left;Ventral (anterior) Forearm 2 days                          Imaging: No pertinent imaging reviewed.    Recent Cultures (last 7 days):         Last 24 Hours Medication List:   Current Facility-Administered Medications   Medication Dose Route Frequency Provider Last Rate    acetaminophen  650 mg Oral Q6H PRN Nba Hyman MD      allopurinol  100 mg Oral BID Nba P  MD Boogie      ALPRAZolam  0.5 mg Oral HS PRN Nba Hyman MD      apixaban  2.5 mg Oral BID Nba Hyman MD      atorvastatin  40 mg Oral Daily With Dinner Nba Hyman MD      docusate sodium  100 mg Oral BID PRN Nba Hyman MD      ferrous sulfate  325 mg Oral Daily With Breakfast Nba Hyman MD      hydrALAZINE  25 mg Oral Q8H RASHAD Nba Hyman MD      insulin lispro  1-5 Units Subcutaneous TID AC Nba Hyman MD      insulin lispro  1-5 Units Subcutaneous HS Nba Hyman MD      latanoprost  1 drop Both Eyes HS Nba Hyman MD      tamsulosin  0.4 mg Oral Daily With Dinner Nba Hyman MD      timolol  1 drop Both Eyes Daily Nba Hyman MD      torsemide  20 mg Oral Daily Nba Hyman MD          Today, Patient Was Seen By: Marie De La O PA-C    **Please Note: This note may have been constructed using a voice recognition system.**

## 2024-08-22 NOTE — PLAN OF CARE
Problem: Prexisting or High Potential for Compromised Skin Integrity  Goal: Skin integrity is maintained or improved  Description: INTERVENTIONS:  - Identify patients at risk for skin breakdown  - Assess and monitor skin integrity  - Assess and monitor nutrition and hydration status  - Monitor labs   - Assess for incontinence   - Turn and reposition patient  - Assist with mobility/ambulation  - Relieve pressure over bony prominences  - Avoid friction and shearing  - Provide appropriate hygiene as needed including keeping skin clean and dry  - Evaluate need for skin moisturizer/barrier cream  - Collaborate with interdisciplinary team   - Patient/family teaching  - Consider wound care consult   Outcome: Progressing     Problem: SAFETY ADULT  Goal: Patient will remain free of falls  Description: INTERVENTIONS:  - Educate patient/family on patient safety including physical limitations  - Instruct patient to call for assistance with activity   - Consult OT/PT to assist with strengthening/mobility   - Keep Call bell within reach  - Keep bed low and locked with side rails adjusted as appropriate  - Keep care items and personal belongings within reach  - Initiate and maintain comfort rounds  - Make Fall Risk Sign visible to staff  - Offer Toileting every 2 Hours, in advance of need  - Initiate/Maintain bed alarm  - Obtain necessary fall risk management equipment: alarm  - Apply yellow socks and bracelet for high fall risk patients  - Consider moving patient to room near nurses station  Outcome: Progressing     Problem: DISCHARGE PLANNING  Goal: Discharge to home or other facility with appropriate resources  Description: INTERVENTIONS:  - Identify barriers to discharge w/patient and caregiver  - Arrange for needed discharge resources and transportation as appropriate  - Identify discharge learning needs (meds, wound care, etc.)  - Arrange for interpretive services to assist at discharge as needed  - Refer to Case  Management Department for coordinating discharge planning if the patient needs post-hospital services based on physician/advanced practitioner order or complex needs related to functional status, cognitive ability, or social support system  Outcome: Progressing

## 2024-08-23 LAB
GLUCOSE SERPL-MCNC: 163 MG/DL (ref 65–140)
GLUCOSE SERPL-MCNC: 168 MG/DL (ref 65–140)
GLUCOSE SERPL-MCNC: 178 MG/DL (ref 65–140)
GLUCOSE SERPL-MCNC: 191 MG/DL (ref 65–140)

## 2024-08-23 PROCEDURE — 82948 REAGENT STRIP/BLOOD GLUCOSE: CPT

## 2024-08-23 PROCEDURE — 99232 SBSQ HOSP IP/OBS MODERATE 35: CPT | Performed by: INTERNAL MEDICINE

## 2024-08-23 RX ADMIN — ALLOPURINOL 100 MG: 100 TABLET ORAL at 09:09

## 2024-08-23 RX ADMIN — ALLOPURINOL 100 MG: 100 TABLET ORAL at 17:15

## 2024-08-23 RX ADMIN — ATORVASTATIN CALCIUM 40 MG: 40 TABLET, FILM COATED ORAL at 17:15

## 2024-08-23 RX ADMIN — APIXABAN 2.5 MG: 2.5 TABLET, FILM COATED ORAL at 17:15

## 2024-08-23 RX ADMIN — HYDRALAZINE HYDROCHLORIDE 25 MG: 25 TABLET ORAL at 21:15

## 2024-08-23 RX ADMIN — FERROUS SULFATE TAB 325 MG (65 MG ELEMENTAL FE) 325 MG: 325 (65 FE) TAB at 09:09

## 2024-08-23 RX ADMIN — INSULIN LISPRO 1 UNITS: 100 INJECTION, SOLUTION INTRAVENOUS; SUBCUTANEOUS at 11:37

## 2024-08-23 RX ADMIN — TIMOLOL MALEATE 1 DROP: 5 SOLUTION OPHTHALMIC at 09:09

## 2024-08-23 RX ADMIN — LATANOPROST 1 DROP: 50 SOLUTION OPHTHALMIC at 21:16

## 2024-08-23 RX ADMIN — TORSEMIDE 20 MG: 20 TABLET ORAL at 09:09

## 2024-08-23 RX ADMIN — HYDRALAZINE HYDROCHLORIDE 25 MG: 25 TABLET ORAL at 05:35

## 2024-08-23 RX ADMIN — INSULIN LISPRO 1 UNITS: 100 INJECTION, SOLUTION INTRAVENOUS; SUBCUTANEOUS at 09:09

## 2024-08-23 RX ADMIN — APIXABAN 2.5 MG: 2.5 TABLET, FILM COATED ORAL at 09:09

## 2024-08-23 RX ADMIN — INSULIN LISPRO 1 UNITS: 100 INJECTION, SOLUTION INTRAVENOUS; SUBCUTANEOUS at 17:20

## 2024-08-23 RX ADMIN — TAMSULOSIN HYDROCHLORIDE 0.4 MG: 0.4 CAPSULE ORAL at 17:15

## 2024-08-23 RX ADMIN — INSULIN LISPRO 1 UNITS: 100 INJECTION, SOLUTION INTRAVENOUS; SUBCUTANEOUS at 21:18

## 2024-08-23 NOTE — PROGRESS NOTES
[facility]  Progress Note  Name: Yao Tipton I  MRN: [id number]  Unit/Bed#: 2 [facility] 204 B I Date of Admission: 8/19/2024   Date of Service: 8/23/2024 I Hospital Day: 3    Assessment & Plan   * Unable to care for self  Assessment & Plan  As per records, lives with 100 year old sister in law and son. Patient's son is currently hospitalized  Noncompliant with medication and Aox2 (self, place)  Fall precautions  PT/OT evaluation  CM working on placement    Gout  Assessment & Plan  Continue allopurinol    BPH (benign prostatic hyperplasia)  Assessment & Plan  Continue tamsulosin    Dyslipidemia  Assessment & Plan  Continue atorvastatin nightly    Chronic combined systolic and diastolic CHF (congestive heart failure) (HCC)  Assessment & Plan  No evidence of decompensation  Echo (7/6/23): EF 60%  Continue torsemide    Chronic anemia  Assessment & Plan  Hgb at baseline, no active bleeding  Continue ferrous sulfate    Benign hypertension with CKD (chronic kidney disease) stage IV (HCC)  Assessment & Plan  BP stable today  Continue home torsemide and hydralazine  Monitor vitals per routine    Chronic atrial fibrillation (HCC)  Assessment & Plan  Rate controlled  AC with Eliquis    Diabetes mellitus with peripheral artery disease (HCC)  Assessment & Plan  Previous A1c 8.8  Hold home anti glycemic medication  Continue SSI with glucose checks  Hypoglycemia protocol             VTE Pharmacologic Prophylaxis: VTE Score: 4 Moderate Risk (Score 3-4) - Pharmacological DVT Prophylaxis Ordered: apixaban (Eliquis).    Mobility:   Basic Mobility Inpatient Raw Score: 18  JH-HLM Goal: 6: Walk 10 steps or more  JH-HLM Achieved: 6: Walk 10 steps or more  JH-HLM Goal achieved. Continue to encourage appropriate mobility.    Patient Centered Rounds: I performed bedside rounds with nursing staff today.   Discussions with Specialists or Other Care Team Provider: Nursing, CM    Education and Discussions with Family /  Patient:  Updated sister law that will notify her once we find placement; currently son is hospitalized.     Total Time Spent on Date of Encounter in care of patient: 30 mins. This time was spent on one or more of the following: performing physical exam; counseling and coordination of care; obtaining or reviewing history; documenting in the medical record; reviewing/ordering tests, medications or procedures; communicating with other healthcare professionals and discussing with patient's family/caregivers.    Current Length of Stay: 3 day(s)  Current Patient Status: Inpatient   Certification Statement: The patient will continue to require additional inpatient hospital stay due to placement  Discharge Plan:  TBD    Code Status: Level 3 - DNAR and DNI    Subjective:   Patient sitting upright in bed today in no acute distress.     Objective:     Vitals:   Temp (24hrs), Av.2 °F (36.2 °C), Min:95.2 °F (35.1 °C), Max:98 °F (36.7 °C)    Temp:  [95.2 °F (35.1 °C)-98 °F (36.7 °C)] 98 °F (36.7 °C)  HR:  [71-97] 97  Resp:  [16-19] 18  BP: (110-132)/(59-98) 111/59  SpO2:  [97 %-99 %] 97 %  Body mass index is 23.21 kg/m².     Input and Output Summary (last 24 hours):     Intake/Output Summary (Last 24 hours) at 2024 1257  Last data filed at 2024 0915  Gross per 24 hour   Intake --   Output 150 ml   Net -150 ml       Physical Exam:   Physical Exam  Vitals and nursing note reviewed.   Constitutional:       General: He is not in acute distress.     Appearance: He is well-developed.   HENT:      Head: Normocephalic and atraumatic.   Eyes:      Conjunctiva/sclera: Conjunctivae normal.   Cardiovascular:      Rate and Rhythm: Normal rate and regular rhythm.      Heart sounds: No murmur heard.  Pulmonary:      Effort: Pulmonary effort is normal. No respiratory distress.      Breath sounds: Normal breath sounds.   Abdominal:      Palpations: Abdomen is soft.      Tenderness: There is no abdominal tenderness.    Musculoskeletal:         General: No swelling.      Cervical back: Neck supple.   Skin:     General: Skin is warm and dry.      Capillary Refill: Capillary refill takes less than 2 seconds.   Neurological:      Mental Status: He is alert. Mental status is at baseline.   Psychiatric:         Mood and Affect: Mood normal.          Additional Data:     Labs:  Results from last 7 days   Lab Units 08/21/24  0441 08/20/24  0448   WBC Thousand/uL 5.56 5.01   HEMOGLOBIN g/dL 12.1 12.1   HEMATOCRIT % 37.3 37.2   PLATELETS Thousands/uL 143* 135*   SEGS PCT %  --  74   LYMPHO PCT %  --  13*   MONO PCT %  --  11   EOS PCT %  --  2     Results from last 7 days   Lab Units 08/21/24  0441 08/20/24  0448   SODIUM mmol/L 136 138   POTASSIUM mmol/L 3.7 3.5   CHLORIDE mmol/L 102 103   CO2 mmol/L 27 26   BUN mg/dL 47* 46*   CREATININE mg/dL 1.79* 1.66*   ANION GAP mmol/L 7 9   CALCIUM mg/dL 8.3* 8.6   ALBUMIN g/dL  --  3.3*   TOTAL BILIRUBIN mg/dL  --  1.00   ALK PHOS U/L  --  128*   ALT U/L  --  13   AST U/L  --  13   GLUCOSE RANDOM mg/dL 91 120         Results from last 7 days   Lab Units 08/23/24  1124 08/23/24  0744 08/22/24  2154 08/22/24  2043 08/22/24  1614 08/22/24  1116 08/22/24  0731 08/21/24  2122 08/21/24  1614 08/21/24  1134 08/21/24  0730 08/20/24 2005   POC GLUCOSE mg/dl 178* 163* 114 65 152* 220* 184* 197* 192* 239* 127 218*     Results from last 7 days   Lab Units 08/19/24  1903   HEMOGLOBIN A1C % 7.2*     Results from last 7 days   Lab Units 08/19/24  1903   LACTIC ACID mmol/L 1.1       Lines/Drains:  Invasive Devices       Peripheral Intravenous Line  Duration             Peripheral IV 08/19/24 Distal;Left;Ventral (anterior) Forearm 3 days                          Imaging: No pertinent imaging reviewed.    Recent Cultures (last 7 days):         Last 24 Hours Medication List:   Current Facility-Administered Medications   Medication Dose Route Frequency Provider Last Rate    acetaminophen  650 mg Oral Q6H PRN Nba P  MD Boogie      allopurinol  100 mg Oral BID Nba Hyman MD      ALPRAZolam  0.5 mg Oral HS PRN Nba Hyman MD      apixaban  2.5 mg Oral BID Nba Hyman MD      atorvastatin  40 mg Oral Daily With Dinner Nba Hyman MD      docusate sodium  100 mg Oral BID PRN Nba Hyman MD      ferrous sulfate  325 mg Oral Daily With Breakfast Nba Hyman MD      hydrALAZINE  25 mg Oral Q8H RASHAD Nba Hyman MD      insulin lispro  1-5 Units Subcutaneous TID AC Nba Hyman MD      insulin lispro  1-5 Units Subcutaneous HS Nba Hyman MD      latanoprost  1 drop Both Eyes HS Nba Hyman MD      tamsulosin  0.4 mg Oral Daily With Dinner Nba Hyman MD      timolol  1 drop Both Eyes Daily Nba Hyman MD      torsemide  20 mg Oral Daily Nab Hyman MD          Today, Patient Was Seen By: Marie De La O PA-C    **Please Note: This note may have been constructed using a voice recognition system.**

## 2024-08-23 NOTE — PLAN OF CARE
Problem: Prexisting or High Potential for Compromised Skin Integrity  Goal: Skin integrity is maintained or improved  Description: INTERVENTIONS:  - Identify patients at risk for skin breakdown  - Assess and monitor skin integrity  - Assess and monitor nutrition and hydration status  - Monitor labs   - Assess for incontinence   - Turn and reposition patient  - Assist with mobility/ambulation  - Relieve pressure over bony prominences  - Avoid friction and shearing  - Provide appropriate hygiene as needed including keeping skin clean and dry  - Evaluate need for skin moisturizer/barrier cream  - Collaborate with interdisciplinary team   - Patient/family teaching  - Consider wound care consult   Outcome: Progressing     Problem: SAFETY ADULT  Goal: Patient will remain free of falls  Description: INTERVENTIONS:  - Educate patient/family on patient safety including physical limitations  - Instruct patient to call for assistance with activity   - Consult OT/PT to assist with strengthening/mobility   - Keep Call bell within reach  - Keep bed low and locked with side rails adjusted as appropriate  - Keep care items and personal belongings within reach  - Initiate and maintain comfort rounds  - Make Fall Risk Sign visible to staff  - Offer Toileting every 2 Hours, in advance of need  - Initiate/Maintain bed/chair alarm  - Obtain necessary fall risk management equipment: rolling walker  - Apply yellow socks and bracelet for high fall risk patients  - Consider moving patient to room near nurses station  Outcome: Progressing     Problem: DISCHARGE PLANNING  Goal: Discharge to home or other facility with appropriate resources  Description: INTERVENTIONS:  - Identify barriers to discharge w/patient and caregiver  - Arrange for needed discharge resources and transportation as appropriate  - Identify discharge learning needs (meds, wound care, etc.)  - Arrange for interpretive services to assist at discharge as needed  - Refer to  Case Management Department for coordinating discharge planning if the patient needs post-hospital services based on physician/advanced practitioner order or complex needs related to functional status, cognitive ability, or social support system  Outcome: Progressing

## 2024-08-23 NOTE — PLAN OF CARE
Problem: Prexisting or High Potential for Compromised Skin Integrity  Goal: Skin integrity is maintained or improved  Description: INTERVENTIONS:  - Identify patients at risk for skin breakdown  - Assess and monitor skin integrity  - Assess and monitor nutrition and hydration status  - Monitor labs   - Assess for incontinence   - Turn and reposition patient  - Assist with mobility/ambulation  - Relieve pressure over bony prominences  - Avoid friction and shearing  - Provide appropriate hygiene as needed including keeping skin clean and dry  - Evaluate need for skin moisturizer/barrier cream  - Collaborate with interdisciplinary team   - Patient/family teaching  - Consider wound care consult   Outcome: Progressing     Problem: SAFETY ADULT  Goal: Patient will remain free of falls  Description: INTERVENTIONS:  - Educate patient/family on patient safety including physical limitations  - Instruct patient to call for assistance with activity   - Consult OT/PT to assist with strengthening/mobility   - Keep Call bell within reach  - Keep bed low and locked with side rails adjusted as appropriate  - Keep care items and personal belongings within reach  - Initiate and maintain comfort rounds  - Make Fall Risk Sign visible to staff  - Offer Toileting every 2 Hours, in advance of need  - Initiate/Maintain bed alarm  - Obtain necessary fall risk management equipment: yellow socks   - Apply yellow socks and bracelet for high fall risk patients  - Consider moving patient to room near nurses station  Outcome: Progressing     Problem: DISCHARGE PLANNING  Goal: Discharge to home or other facility with appropriate resources  Description: INTERVENTIONS:  - Identify barriers to discharge w/patient and caregiver  - Arrange for needed discharge resources and transportation as appropriate  - Identify discharge learning needs (meds, wound care, etc.)  - Arrange for interpretive services to assist at discharge as needed  - Refer to Case  Management Department for coordinating discharge planning if the patient needs post-hospital services based on physician/advanced practitioner order or complex needs related to functional status, cognitive ability, or social support system  Outcome: Progressing

## 2024-08-24 VITALS
TEMPERATURE: 97.3 F | RESPIRATION RATE: 19 BRPM | HEIGHT: 67 IN | DIASTOLIC BLOOD PRESSURE: 78 MMHG | SYSTOLIC BLOOD PRESSURE: 134 MMHG | WEIGHT: 148.2 LBS | BODY MASS INDEX: 23.26 KG/M2 | HEART RATE: 84 BPM | OXYGEN SATURATION: 99 %

## 2024-08-24 LAB
ANION GAP SERPL CALCULATED.3IONS-SCNC: 8 MMOL/L (ref 4–13)
BUN SERPL-MCNC: 55 MG/DL (ref 5–25)
CALCIUM SERPL-MCNC: 8.1 MG/DL (ref 8.4–10.2)
CHLORIDE SERPL-SCNC: 98 MMOL/L (ref 96–108)
CO2 SERPL-SCNC: 26 MMOL/L (ref 21–32)
CREAT SERPL-MCNC: 2.1 MG/DL (ref 0.6–1.3)
ERYTHROCYTE [DISTWIDTH] IN BLOOD BY AUTOMATED COUNT: 17.2 % (ref 11.6–15.1)
GFR SERPL CREATININE-BSD FRML MDRD: 27 ML/MIN/1.73SQ M
GLUCOSE SERPL-MCNC: 139 MG/DL (ref 65–140)
GLUCOSE SERPL-MCNC: 147 MG/DL (ref 65–140)
GLUCOSE SERPL-MCNC: 317 MG/DL (ref 65–140)
HCT VFR BLD AUTO: 38.2 % (ref 36.5–49.3)
HGB BLD-MCNC: 12 G/DL (ref 12–17)
MCH RBC QN AUTO: 30.9 PG (ref 26.8–34.3)
MCHC RBC AUTO-ENTMCNC: 31.4 G/DL (ref 31.4–37.4)
MCV RBC AUTO: 99 FL (ref 82–98)
PLATELET # BLD AUTO: 136 THOUSANDS/UL (ref 149–390)
PMV BLD AUTO: 9.2 FL (ref 8.9–12.7)
POTASSIUM SERPL-SCNC: 3.9 MMOL/L (ref 3.5–5.3)
RBC # BLD AUTO: 3.88 MILLION/UL (ref 3.88–5.62)
SODIUM SERPL-SCNC: 132 MMOL/L (ref 135–147)
WBC # BLD AUTO: 5.03 THOUSAND/UL (ref 4.31–10.16)

## 2024-08-24 PROCEDURE — 99239 HOSP IP/OBS DSCHRG MGMT >30: CPT | Performed by: INTERNAL MEDICINE

## 2024-08-24 PROCEDURE — 85027 COMPLETE CBC AUTOMATED: CPT | Performed by: INTERNAL MEDICINE

## 2024-08-24 PROCEDURE — 82948 REAGENT STRIP/BLOOD GLUCOSE: CPT

## 2024-08-24 PROCEDURE — 80048 BASIC METABOLIC PNL TOTAL CA: CPT | Performed by: INTERNAL MEDICINE

## 2024-08-24 RX ORDER — TORSEMIDE 20 MG/1
20 TABLET ORAL DAILY
Status: DISCONTINUED | OUTPATIENT
Start: 2024-08-24 | End: 2024-08-24 | Stop reason: HOSPADM

## 2024-08-24 RX ADMIN — TIMOLOL MALEATE 1 DROP: 5 SOLUTION OPHTHALMIC at 09:09

## 2024-08-24 RX ADMIN — INSULIN LISPRO 3 UNITS: 100 INJECTION, SOLUTION INTRAVENOUS; SUBCUTANEOUS at 14:39

## 2024-08-24 RX ADMIN — FERROUS SULFATE TAB 325 MG (65 MG ELEMENTAL FE) 325 MG: 325 (65 FE) TAB at 09:09

## 2024-08-24 RX ADMIN — HYDRALAZINE HYDROCHLORIDE 25 MG: 25 TABLET ORAL at 14:42

## 2024-08-24 RX ADMIN — APIXABAN 2.5 MG: 2.5 TABLET, FILM COATED ORAL at 09:09

## 2024-08-24 RX ADMIN — TORSEMIDE 20 MG: 20 TABLET ORAL at 09:09

## 2024-08-24 RX ADMIN — HYDRALAZINE HYDROCHLORIDE 25 MG: 25 TABLET ORAL at 05:32

## 2024-08-24 RX ADMIN — ALLOPURINOL 100 MG: 100 TABLET ORAL at 09:09

## 2024-08-24 NOTE — CASE MANAGEMENT
Case Management Discharge Planning Note    Patient name Yao Tipton  Location 2 South 2 South 204 B MRN [id number]  : 1940 Date 2024       Current Admission Date: 2024  Current Admission Diagnosis:Unable to care for self   Patient Active Problem List    Diagnosis Date Noted Date Diagnosed    Unable to care for self 2024     Gout 2024     Left ankle pain 2023     Chronic combined systolic and diastolic CHF (congestive heart failure) ([facility]) 2023     Dyslipidemia 2023     BPH (benign prostatic hyperplasia) 2023     Cellulitis of left lower extremity 2023     Constipation 08/10/2023     Pleural effusion exudative 2023     Goals of care, counseling/discussion 2023     Duodenal ulcer 07/10/2023     Encephalopathy 2023     Recent empyema of lung with persistent locuted R hydropneumothorax 2023     Hypoglycemia 2023     Thrombocytopenia ([facility]) 2023     Diarrhea 2023     Hypothermia 2023     Septic shock ([facility]) 2023     Urinary retention 2023     Macrocytosis 2023     Chronic anemia 2023     Chronic kidney disease-mineral and bone disorder 2023     Advanced care planning/counseling discussion 2023     Benign hypertension with CKD (chronic kidney disease) stage IV ([facility]) 2022     Persistent proteinuria 2022     COVID-19 2022     Electrolyte abnormality 2022     Cellulitis of left upper extremity 2021     Chronic atrial fibrillation ([facility]) 2021     Vitamin D deficiency 10/18/2019     Chronic kidney disease, stage 4 (severe) ([facility]) 2019     Acute on chronic combined systolic and diastolic congestive heart failure ([facility]) 2019     Recent pericardial effusion 2019     Leg edema 01/10/2019     Diabetes mellitus with peripheral artery disease ([facility]) 01/10/2019     PVC (premature ventricular contraction) 2018     Essential  hypertension 12/19/2018     Closed traumatic displaced fracture of distal end of left radius with malunion 02/09/2018       LOS (days): 4  Geometric Mean LOS (GMLOS) (days): 1.8  Days to GMLOS:-2.2     OBJECTIVE:  Risk of Unplanned Readmission Score: 23.49     Current admission status: Inpatient   Preferred Pharmacy:   [id number] - [place], [place] - [address]  [facility] [address] [place] [id number]  Phone: [phone] Fax: [phone]    Primary Care Provider: Nicho Baltazar DO    Primary Insurance: MEDICARE  Secondary Insurance: BLUE CROSS    DISCHARGE DETAILS:    Discharge planning discussed with:: Patient  Freedom of Choice: Yes  Comments - Freedom of Choice: SW following to assist with planning. Respite placement at [facility] is planned.  Pt's son was able to obtain his checkbook and write a check to [facility] for pt's stay. SW met with pt to review plans and IMM.  Pt is aware of plan and agreeable with transfer to facility today.  Son was also notified of plan to transfer today by his RN.  CM contacted family/caregiver?: Yes    Contacts  Patient Contacts: González Tipton  Relationship to Patient:: Family  Reason/Outcome: Discharge Planning    Other Referral/Resources/Interventions Provided:  Interventions: SNF, Respite Care  Referral Comments: Respite stay is planned at [facility].  Plan to transfer today was discussed with Teresa at [facility]. Teresa aware that son has written check for payment for stay.  However due to weekend transfer check will not be sent in facility envelope.  Check will be delivered to facility on Monday.    Treatment Team Recommendation: SNF (until son returns home)  Discharge Destination Plan:: SNF (until son returns home)  Transport at Discharge : Wheelchair van    IMM Given (Date):: 08/24/24  IMM Given to:: Patient (IMM #2 reviewed with pt.  Pt verbalized understanding and agrees with discharge determination.  Pt signed IMM  and copy given. Copy also placed in scan bin for chart.)            Accepting Facility Name, City & State : [place], [place]  Receiving Facility/Agency Phone Number: [phone]

## 2024-08-24 NOTE — NURSING NOTE
Patient discharging to [facility] at this time.  IV catheter removed without difficulty.  Report called to Karin at receiving facility.  Taken via wheelchair by transportation.

## 2024-08-24 NOTE — ASSESSMENT & PLAN NOTE
As per records, lives with 100 year old sister in law and son. Patient's son is currently hospitalized  Noncompliant with medication and Aox2 (self, place)  Fall precautions  PT/OT evaluation  CM following. Discharge to [facility] for respite care

## 2024-08-24 NOTE — ASSESSMENT & PLAN NOTE
Lab Results   Component Value Date    EGFR 27 08/24/2024    EGFR 33 08/21/2024    EGFR 37 08/20/2024    CREATININE 2.10 (H) 08/24/2024    CREATININE 1.79 (H) 08/21/2024    CREATININE 1.66 (H) 08/20/2024   Baseline creatinine (1.7-2.0)  Close to baseline  Avoid hypotension and nephrotoxic agents

## 2024-08-24 NOTE — [place] UNIVERSAL TRANSFER FORM
"[place] UNIVERSAL TRANSFER FOR[place]  (ALL ITEMS MUST BE COMPLETED)    1. TRANSFER FROM: [facility]      TRANSFER TO: [facility]    2. DATE OF TRANSFER: 8/24/2024                        TIME OF TRANSFER: 1530    3. PATIENT NAME: Yao Tipton K      YOB: 1940                             GENDER: male    4. LANGUAGE:   English    5. PHYSICIAN NAME:  Maribell Bates MD                   PHONE: [phone]    6. CODE STATUS: Level 3 - DNAR and DNI        Out of Hospital DNR Attached: Yes    7. :                                      :  Extended Emergency Contact Information  Primary Emergency Contact: SaeedGonzález  Address: [address]  Mobile Phone: [phone]  Relation: Son  Secondary Emergency Contact: Gisselle Edwards  [place] Phone: [phone]  Relation: Sister In-Law           Health Care Representative/Proxy:  Yes           Legal Guardian:  No             NAME OF:            HEALTH CARE REPRESENTATIVE/PROXY:    González Saeed                                     OR           LEGAL GUARDIAN, IF NOT :                                               PHONE:  (Day)           (Night)                        (Cell) [phone]    8. REASON FOR TRANSFER: (Must include brief medical history and recent changes in physical function or cognition.) unable to care for self            V/S: /78   Pulse 84   Temp (!) 97.3 °F (36.3 °C) (Oral)   Resp 19   Ht 5' 7\" (1.702 m)   Wt 67.2 kg (148 lb 3.2 oz)   SpO2 99%   BMI 23.21 kg/m²           PAIN: None    9. PRIMARY DIAGNOSIS: Unable to care for self      Secondary Diagnosis:         Pacemaker: No      Internal Defib: No          Mental Health Diagnosis (if Applicable):    10. RESTRAINTS: No     11. RESPIRATORY NEEDS: None    12. ISOLATION/PRECAUTION: None    13. ALLERGY: Elemental sulfur and " Sulfa antibiotics    14. SENSORY:       Vision Good    15. SKIN CONDITION: No Wounds    16. DIET: Special (describe)2 gram Carb control, 2 liter fluid restriction    17. IV ACCESS: None    18. PERSONAL ITEMS SENT WITH PATIENT: Glasses and Other  watch, ring    19. ATTACHED DOCUMENTS: MUST ATTACH CURRENT MEDICATION INFORMATION Face Sheet, Code Status, and Discharge Summary    20. AT RISK ALERTS:None        HARM TO: N/A    21. WEIGHT BEARING STATUS:         Left Leg: None        Right Leg: None    22. MENTAL STATUS:Alert and Oriented    23. FUNCTION:        Walk: With Help        Transfer: With Help        Toilet: With Help        Feed: Self    24. IMMUNIZATIONS/SCREENING:     Immunization History   Administered Date(s) Administered    Influenza, high dose seasonal 0.7 mL 01/05/2021    Pneumococcal Conjugate 13-Valent 01/13/2019    Tdap 01/05/2021       25. BOWEL: Continent    26. BLADDER: Continent    27. SENDING FACILITY CONTACT: Alyson Burns RN                  Title: RN         Unit: [address] [place] [address]        Phone: [phone]          REC'G FACILITY CONTACT (if known):        Title:        Unit:         Phone:         FORM PREFILLED BY (if applicable)       Title:       Unit:        Phone:         FORM COMPLETED BY Alyson Burns RN      Title: RN      Phone: [phone]

## 2024-08-24 NOTE — PLAN OF CARE
Problem: Prexisting or High Potential for Compromised Skin Integrity  Goal: Skin integrity is maintained or improved  Description: INTERVENTIONS:  - Identify patients at risk for skin breakdown  - Assess and monitor skin integrity  - Assess and monitor nutrition and hydration status  - Monitor labs   - Assess for incontinence   - Turn and reposition patient  - Assist with mobility/ambulation  - Relieve pressure over bony prominences  - Avoid friction and shearing  - Provide appropriate hygiene as needed including keeping skin clean and dry  - Evaluate need for skin moisturizer/barrier cream  - Collaborate with interdisciplinary team   - Patient/family teaching  - Consider wound care consult   Outcome: Progressing     Problem: DISCHARGE PLANNING  Goal: Discharge to home or other facility with appropriate resources  Description: INTERVENTIONS:  - Identify barriers to discharge w/patient and caregiver  - Arrange for needed discharge resources and transportation as appropriate  - Identify discharge learning needs (meds, wound care, etc.)  - Arrange for interpretive services to assist at discharge as needed  - Refer to Case Management Department for coordinating discharge planning if the patient needs post-hospital services based on physician/advanced practitioner order or complex needs related to functional status, cognitive ability, or social support system  Outcome: Progressing     Problem: SAFETY ADULT  Goal: Patient will remain free of falls  Description: INTERVENTIONS:  - Educate patient/family on patient safety including physical limitations  - Instruct patient to call for assistance with activity   - Consult OT/PT to assist with strengthening/mobility   - Keep Call bell within reach  - Keep bed low and locked with side rails adjusted as appropriate  - Keep care items and personal belongings within reach  - Initiate and maintain comfort rounds  - Make Fall Risk Sign visible to staff  - Offer Toileting every 2  Hours, in advance of need  - Initiate/Maintain bed alarm  - Obtain necessary fall risk management equipment: socks  - Apply yellow socks and bracelet for high fall risk patients  - Consider moving patient to room near nurses station  Outcome: Progressing

## 2024-08-24 NOTE — DISCHARGE SUMMARY
[facility]  Discharge- Yao Tipton 1940, 83 y.o. male MRN: [id number]  Unit/Bed#: 2 [address] Encounter: [id number]  Primary Care Provider: Nicho Baltazar DO   Date and time admitted to hospital: 8/19/2024  6:32 PM    * Unable to care for self  Assessment & Plan  As per records, lives with 100 year old sister in law and son. Patient's son is currently hospitalized  Noncompliant with medication and Aox2 (self, place)  Fall precautions  PT/OT evaluation  CM following. Discharge to [facility] for respite care    Gout  Assessment & Plan  Continue allopurinol    BPH (benign prostatic hyperplasia)  Assessment & Plan  Continue tamsulosin    Dyslipidemia  Assessment & Plan  Continue atorvastatin nightly    Chronic combined systolic and diastolic CHF (congestive heart failure) (HCC)  Assessment & Plan  No evidence of decompensation  Echo (7/6/23): EF 60%  Continue torsemide    Chronic anemia  Assessment & Plan  Hgb at baseline, no active bleeding  Continue ferrous sulfate    Benign hypertension with CKD (chronic kidney disease) stage IV (HCC)  Assessment & Plan  BP stable today  Continue home torsemide and hydralazine  Monitor vitals per routine    Chronic atrial fibrillation (HCC)  Assessment & Plan  Rate controlled  AC with Eliquis    Chronic kidney disease, stage 4 (severe) (HCC)  Assessment & Plan  Lab Results   Component Value Date    EGFR 27 08/24/2024    EGFR 33 08/21/2024    EGFR 37 08/20/2024    CREATININE 2.10 (H) 08/24/2024    CREATININE 1.79 (H) 08/21/2024    CREATININE 1.66 (H) 08/20/2024   Baseline creatinine (1.7-2.0)  Close to baseline  Avoid hypotension and nephrotoxic agents    Diabetes mellitus with peripheral artery disease (HCC)  Assessment & Plan  Previous A1c 8.8  Hold home anti glycemic medication  Continue SSI with glucose checks  Hypoglycemia protocol        Medical Problems       Resolved Problems  Date Reviewed: 8/24/2024   None       Discharging Physician /  "Practitioner: Marie De La O PA-C  PCP: Nicho Baltazar DO  Admission Date:   Admission Orders (From admission, onward)       Ordered        08/20/24 1434  INPATIENT ADMISSION  Once            08/19/24 2021  Place in Observation  Once                          Discharge Date: 08/24/24    Consultations During Hospital Stay:  None    Procedures Performed:   None    Significant Findings / Test Results:   None    Incidental Findings:   None     Test Results Pending at Discharge (will require follow up):   None     Outpatient Tests Requested:  None    Complications:  None    Reason for Admission: Unable to care for self    Hospital Course:   Yao Tipton is a 83 y.o. male patient who originally presented to the hospital on 8/19/2024 due to patient unable to care for self.  Patient's son, who is his primary caretaker is currently hospitalized.  Patient was sent in by a family guided crisis worker as it was unsafe for patient to reside at home by himself.  He does have his sister-in-law who lives with him but she is also elderly and unable to take care of him.  Patient vital signs and lab work is stable at baseline.  Denies any acute medical complaints.  Case management working on placement options for patient and he has been accepted to long-term care facility at [facility].  All patient and family questions answered to the best of my ability.      Please see above list of diagnoses and related plan for additional information.     Condition at Discharge: stable    Discharge Day Visit / Exam:   Subjective:  Patient sitting upright in the chair today. Denies any acute complaints.     Vitals: Blood Pressure: 140/73 (08/24/24 0526)  Pulse: 96 (08/24/24 0526)  Temperature: (!) 97.4 °F (36.3 °C) (08/23/24 2110)  Temp Source: Oral (08/23/24 2110)  Respirations: 20 (08/23/24 2110)  Height: 5' 7\" (170.2 cm) (08/20/24 0758)  Weight - Scale: 67.2 kg (148 lb 3.2 oz) (08/19/24 2202)  SpO2: 97 % (08/24/24 0526)  Exam:   Physical " Exam  Vitals and nursing note reviewed.   Constitutional:       General: He is not in acute distress.     Appearance: He is well-developed.   HENT:      Head: Normocephalic and atraumatic.   Eyes:      Conjunctiva/sclera: Conjunctivae normal.   Cardiovascular:      Rate and Rhythm: Normal rate and regular rhythm.      Heart sounds: No murmur heard.  Pulmonary:      Effort: Pulmonary effort is normal. No respiratory distress.      Breath sounds: Normal breath sounds.   Abdominal:      Palpations: Abdomen is soft.      Tenderness: There is no abdominal tenderness.   Musculoskeletal:         General: No swelling.      Cervical back: Neck supple.   Skin:     General: Skin is warm and dry.      Capillary Refill: Capillary refill takes less than 2 seconds.   Neurological:      Mental Status: He is alert. Mental status is at baseline.   Psychiatric:         Mood and Affect: Mood normal.          Discussion with Family: Updated  (sister) via phone.    Discharge instructions/Information to patient and family:   See after visit summary for information provided to patient and family.      Provisions for Follow-Up Care:  See after visit summary for information related to follow-up care and any pertinent home health orders.      Mobility at time of Discharge:   Basic Mobility Inpatient Raw Score: 18  JH-HLM Goal: 6: Walk 10 steps or more  JH-HLM Achieved: 6: Walk 10 steps or more  HLM Goal achieved. Continue to encourage appropriate mobility.     Disposition:   Long Term Acute Care (LTAC) Facility at [facility]    Planned Readmission: None     Discharge Statement:  I spent 55 minutes discharging the patient. This time was spent on the day of discharge. I had direct contact with the patient on the day of discharge. Greater than 50% of the total time was spent examining patient, answering all patient questions, arranging and discussing plan of care with patient as well as directly providing post-discharge  instructions.  Additional time then spent on discharge activities.    Discharge Medications:  See after visit summary for reconciled discharge medications provided to patient and/or family.      **Please Note: This note may have been constructed using a voice recognition system**

## 2024-11-21 NOTE — ASSESSMENT & PLAN NOTE
Problem: Chronic Conditions and Co-morbidities  Goal: Patient's chronic conditions and co-morbidity symptoms are monitored and maintained or improved  Outcome: Adequate for Discharge     Problem: Discharge Planning  Goal: Discharge to home or other facility with appropriate resources  Outcome: Adequate for Discharge     Problem: Skin/Tissue Integrity  Goal: Absence of new skin breakdown  Description: 1.  Monitor for areas of redness and/or skin breakdown  2.  Assess vascular access sites hourly  3.  Every 4-6 hours minimum:  Change oxygen saturation probe site  4.  Every 4-6 hours:  If on nasal continuous positive airway pressure, respiratory therapy assess nares and determine need for appliance change or resting period.  Outcome: Adequate for Discharge     Problem: Safety - Adult  Goal: Free from fall injury  Outcome: Adequate for Discharge     Problem: ABCDS Injury Assessment  Goal: Absence of physical injury  Outcome: Adequate for Discharge     Problem: Nutrition Deficit:  Goal: Optimize nutritional status  Outcome: Adequate for Discharge     Problem: Pain  Goal: Verbalizes/displays adequate comfort level or baseline comfort level  Outcome: Adequate for Discharge      · Combined CHF   · Continue home Torsemide 40 mg daily  · Monitor I+O, daily weights    Wt Readings from Last 3 Encounters:   07/27/23 68.1 kg (150 lb 1.6 oz)   07/10/23 64.9 kg (143 lb 1.3 oz)   06/28/23 74.5 kg (164 lb 3.9 oz)

## 2024-12-07 ENCOUNTER — APPOINTMENT (EMERGENCY)
Dept: RADIOLOGY | Facility: HOSPITAL | Age: 84
DRG: 291 | End: 2024-12-07
Payer: MEDICARE

## 2024-12-07 ENCOUNTER — HOSPITAL ENCOUNTER (INPATIENT)
Facility: HOSPITAL | Age: 84
LOS: 7 days | Discharge: HOME | DRG: 291 | End: 2024-12-15
Attending: EMERGENCY MEDICINE | Admitting: INTERNAL MEDICINE
Payer: MEDICARE

## 2024-12-07 DIAGNOSIS — K21.9 GASTROESOPHAGEAL REFLUX DISEASE WITHOUT ESOPHAGITIS: ICD-10-CM

## 2024-12-07 DIAGNOSIS — N18.4 CHRONIC KIDNEY DISEASE, STAGE 4 (SEVERE) (HCC): ICD-10-CM

## 2024-12-07 DIAGNOSIS — R26.2 AMBULATORY DYSFUNCTION: ICD-10-CM

## 2024-12-07 DIAGNOSIS — I50.43 ACUTE ON CHRONIC COMBINED SYSTOLIC AND DIASTOLIC CONGESTIVE HEART FAILURE (HCC): ICD-10-CM

## 2024-12-07 DIAGNOSIS — I50.9 CHF (CONGESTIVE HEART FAILURE) (HCC): Primary | ICD-10-CM

## 2024-12-07 DIAGNOSIS — R33.8 ACUTE URINARY RETENTION: ICD-10-CM

## 2024-12-07 PROBLEM — E87.1 HYPONATREMIA: Status: ACTIVE | Noted: 2024-12-07

## 2024-12-07 PROBLEM — S81.801A OPEN WOUND OF BOTH LOWER EXTREMITIES: Status: ACTIVE | Noted: 2024-12-07

## 2024-12-07 PROBLEM — R06.02 SOB (SHORTNESS OF BREATH): Status: ACTIVE | Noted: 2019-01-29

## 2024-12-07 PROBLEM — S81.802A OPEN WOUND OF BOTH LOWER EXTREMITIES: Status: ACTIVE | Noted: 2024-12-07

## 2024-12-07 LAB
2HR DELTA HS TROPONIN: 1 NG/L
ALBUMIN SERPL BCG-MCNC: 3.3 G/DL (ref 3.5–5)
ALP SERPL-CCNC: 141 U/L (ref 34–104)
ALT SERPL W P-5'-P-CCNC: 24 U/L (ref 7–52)
ANION GAP SERPL CALCULATED.3IONS-SCNC: 7 MMOL/L (ref 4–13)
AST SERPL W P-5'-P-CCNC: 24 U/L (ref 13–39)
BASOPHILS # BLD AUTO: 0.02 THOUSANDS/ÂΜL (ref 0–0.1)
BASOPHILS NFR BLD AUTO: 0 % (ref 0–1)
BILIRUB SERPL-MCNC: 0.69 MG/DL (ref 0.2–1)
BNP SERPL-MCNC: 209 PG/ML (ref 0–100)
BUN SERPL-MCNC: 63 MG/DL (ref 5–25)
CALCIUM ALBUM COR SERPL-MCNC: 9 MG/DL (ref 8.3–10.1)
CALCIUM SERPL-MCNC: 8.4 MG/DL (ref 8.4–10.2)
CARDIAC TROPONIN I PNL SERPL HS: 14 NG/L (ref ?–50)
CARDIAC TROPONIN I PNL SERPL HS: 15 NG/L (ref ?–50)
CHLORIDE SERPL-SCNC: 97 MMOL/L (ref 96–108)
CO2 SERPL-SCNC: 27 MMOL/L (ref 21–32)
CREAT SERPL-MCNC: 1.97 MG/DL (ref 0.6–1.3)
EOSINOPHIL # BLD AUTO: 0.02 THOUSAND/ÂΜL (ref 0–0.61)
EOSINOPHIL NFR BLD AUTO: 0 % (ref 0–6)
ERYTHROCYTE [DISTWIDTH] IN BLOOD BY AUTOMATED COUNT: 16.5 % (ref 11.6–15.1)
FLUAV AG UPPER RESP QL IA.RAPID: NEGATIVE
FLUBV AG UPPER RESP QL IA.RAPID: NEGATIVE
GFR SERPL CREATININE-BSD FRML MDRD: 29 ML/MIN/1.73SQ M
GLUCOSE SERPL-MCNC: 215 MG/DL (ref 65–140)
GLUCOSE SERPL-MCNC: 244 MG/DL (ref 65–140)
HCT VFR BLD AUTO: 34 % (ref 36.5–49.3)
HGB BLD-MCNC: 10.9 G/DL (ref 12–17)
IMM GRANULOCYTES # BLD AUTO: 0.03 THOUSAND/UL (ref 0–0.2)
IMM GRANULOCYTES NFR BLD AUTO: 1 % (ref 0–2)
LYMPHOCYTES # BLD AUTO: 0.71 THOUSANDS/ÂΜL (ref 0.6–4.47)
LYMPHOCYTES NFR BLD AUTO: 12 % (ref 14–44)
MAGNESIUM SERPL-MCNC: 2.2 MG/DL (ref 1.9–2.7)
MCH RBC QN AUTO: 32.2 PG (ref 26.8–34.3)
MCHC RBC AUTO-ENTMCNC: 32.1 G/DL (ref 31.4–37.4)
MCV RBC AUTO: 101 FL (ref 82–98)
MONOCYTES # BLD AUTO: 0.62 THOUSAND/ÂΜL (ref 0.17–1.22)
MONOCYTES NFR BLD AUTO: 10 % (ref 4–12)
NEUTROPHILS # BLD AUTO: 4.73 THOUSANDS/ÂΜL (ref 1.85–7.62)
NEUTS SEG NFR BLD AUTO: 77 % (ref 43–75)
NRBC BLD AUTO-RTO: 0 /100 WBCS
PLATELET # BLD AUTO: 100 THOUSANDS/UL (ref 149–390)
PMV BLD AUTO: 9.7 FL (ref 8.9–12.7)
POTASSIUM SERPL-SCNC: 4.6 MMOL/L (ref 3.5–5.3)
PROT SERPL-MCNC: 6.1 G/DL (ref 6.4–8.4)
RBC # BLD AUTO: 3.38 MILLION/UL (ref 3.88–5.62)
SARS-COV+SARS-COV-2 AG RESP QL IA.RAPID: NEGATIVE
SODIUM SERPL-SCNC: 131 MMOL/L (ref 135–147)
WBC # BLD AUTO: 6.13 THOUSAND/UL (ref 4.31–10.16)

## 2024-12-07 PROCEDURE — 83036 HEMOGLOBIN GLYCOSYLATED A1C: CPT | Performed by: NURSE PRACTITIONER

## 2024-12-07 PROCEDURE — 85025 COMPLETE CBC W/AUTO DIFF WBC: CPT | Performed by: EMERGENCY MEDICINE

## 2024-12-07 PROCEDURE — 80053 COMPREHEN METABOLIC PANEL: CPT | Performed by: EMERGENCY MEDICINE

## 2024-12-07 PROCEDURE — 71250 CT THORAX DX C-: CPT

## 2024-12-07 PROCEDURE — 93005 ELECTROCARDIOGRAM TRACING: CPT

## 2024-12-07 PROCEDURE — 99285 EMERGENCY DEPT VISIT HI MDM: CPT | Performed by: EMERGENCY MEDICINE

## 2024-12-07 PROCEDURE — 83880 ASSAY OF NATRIURETIC PEPTIDE: CPT | Performed by: EMERGENCY MEDICINE

## 2024-12-07 PROCEDURE — 87811 SARS-COV-2 COVID19 W/OPTIC: CPT | Performed by: EMERGENCY MEDICINE

## 2024-12-07 PROCEDURE — 71045 X-RAY EXAM CHEST 1 VIEW: CPT

## 2024-12-07 PROCEDURE — 36415 COLL VENOUS BLD VENIPUNCTURE: CPT | Performed by: EMERGENCY MEDICINE

## 2024-12-07 PROCEDURE — 83735 ASSAY OF MAGNESIUM: CPT | Performed by: EMERGENCY MEDICINE

## 2024-12-07 PROCEDURE — 87804 INFLUENZA ASSAY W/OPTIC: CPT | Performed by: EMERGENCY MEDICINE

## 2024-12-07 PROCEDURE — 99285 EMERGENCY DEPT VISIT HI MDM: CPT

## 2024-12-07 PROCEDURE — 99222 1ST HOSP IP/OBS MODERATE 55: CPT | Performed by: NURSE PRACTITIONER

## 2024-12-07 PROCEDURE — 82948 REAGENT STRIP/BLOOD GLUCOSE: CPT

## 2024-12-07 PROCEDURE — 84484 ASSAY OF TROPONIN QUANT: CPT | Performed by: EMERGENCY MEDICINE

## 2024-12-07 RX ORDER — LATANOPROST 50 UG/ML
1 SOLUTION/ DROPS OPHTHALMIC
Status: DISCONTINUED | OUTPATIENT
Start: 2024-12-07 | End: 2024-12-15 | Stop reason: HOSPADM

## 2024-12-07 RX ORDER — FAMOTIDINE 20 MG/1
10 TABLET, FILM COATED ORAL
Status: DISCONTINUED | OUTPATIENT
Start: 2024-12-07 | End: 2024-12-15 | Stop reason: HOSPADM

## 2024-12-07 RX ORDER — ACETAMINOPHEN 325 MG/1
650 TABLET ORAL EVERY 6 HOURS PRN
Status: DISCONTINUED | OUTPATIENT
Start: 2024-12-07 | End: 2024-12-15 | Stop reason: HOSPADM

## 2024-12-07 RX ORDER — FUROSEMIDE 10 MG/ML
100 INJECTION INTRAMUSCULAR; INTRAVENOUS 2 TIMES DAILY
Status: DISCONTINUED | OUTPATIENT
Start: 2024-12-08 | End: 2024-12-07

## 2024-12-07 RX ORDER — INSULIN LISPRO 100 [IU]/ML
1-5 INJECTION, SOLUTION INTRAVENOUS; SUBCUTANEOUS
Status: DISCONTINUED | OUTPATIENT
Start: 2024-12-08 | End: 2024-12-15 | Stop reason: HOSPADM

## 2024-12-07 RX ORDER — FUROSEMIDE 10 MG/ML
100 INJECTION INTRAMUSCULAR; INTRAVENOUS 2 TIMES DAILY
Status: DISCONTINUED | OUTPATIENT
Start: 2024-12-08 | End: 2024-12-08

## 2024-12-07 RX ORDER — FUROSEMIDE 10 MG/ML
40 INJECTION INTRAMUSCULAR; INTRAVENOUS ONCE
Status: COMPLETED | OUTPATIENT
Start: 2024-12-07 | End: 2024-12-07

## 2024-12-07 RX ORDER — ZINC SULFATE 50(220)MG
220 CAPSULE ORAL DAILY
Status: DISCONTINUED | OUTPATIENT
Start: 2024-12-08 | End: 2024-12-15 | Stop reason: HOSPADM

## 2024-12-07 RX ORDER — TIMOLOL MALEATE 5 MG/ML
1 SOLUTION/ DROPS OPHTHALMIC DAILY
Status: DISCONTINUED | OUTPATIENT
Start: 2024-12-08 | End: 2024-12-15 | Stop reason: HOSPADM

## 2024-12-07 RX ORDER — TAMSULOSIN HYDROCHLORIDE 0.4 MG/1
0.4 CAPSULE ORAL
Status: DISCONTINUED | OUTPATIENT
Start: 2024-12-07 | End: 2024-12-15 | Stop reason: HOSPADM

## 2024-12-07 RX ORDER — INSULIN GLARGINE-YFGN 100 [IU]/ML
5 INJECTION, SOLUTION SUBCUTANEOUS
Status: DISCONTINUED | OUTPATIENT
Start: 2024-12-07 | End: 2024-12-07

## 2024-12-07 RX ORDER — INSULIN GLARGINE 100 [IU]/ML
5 INJECTION, SOLUTION SUBCUTANEOUS
Status: DISCONTINUED | OUTPATIENT
Start: 2024-12-07 | End: 2024-12-10

## 2024-12-07 RX ORDER — INSULIN LISPRO 100 [IU]/ML
1-5 INJECTION, SOLUTION INTRAVENOUS; SUBCUTANEOUS
Status: DISCONTINUED | OUTPATIENT
Start: 2024-12-07 | End: 2024-12-15 | Stop reason: HOSPADM

## 2024-12-07 RX ORDER — FERROUS SULFATE 325(65) MG
325 TABLET ORAL
Status: DISCONTINUED | OUTPATIENT
Start: 2024-12-08 | End: 2024-12-15 | Stop reason: HOSPADM

## 2024-12-07 RX ORDER — ATORVASTATIN CALCIUM 40 MG/1
40 TABLET, FILM COATED ORAL
Status: DISCONTINUED | OUTPATIENT
Start: 2024-12-07 | End: 2024-12-15 | Stop reason: HOSPADM

## 2024-12-07 RX ORDER — ASCORBIC ACID 500 MG
500 TABLET ORAL DAILY
Status: DISCONTINUED | OUTPATIENT
Start: 2024-12-08 | End: 2024-12-15 | Stop reason: HOSPADM

## 2024-12-07 RX ORDER — ALLOPURINOL 100 MG/1
100 TABLET ORAL 2 TIMES DAILY
Status: DISCONTINUED | OUTPATIENT
Start: 2024-12-07 | End: 2024-12-15 | Stop reason: HOSPADM

## 2024-12-07 RX ADMIN — ATORVASTATIN CALCIUM 40 MG: 40 TABLET, FILM COATED ORAL at 21:29

## 2024-12-07 RX ADMIN — ALLOPURINOL 100 MG: 100 TABLET ORAL at 21:29

## 2024-12-07 RX ADMIN — INSULIN LISPRO 2 UNITS: 100 INJECTION, SOLUTION INTRAVENOUS; SUBCUTANEOUS at 21:28

## 2024-12-07 RX ADMIN — INSULIN GLARGINE 5 UNITS: 100 INJECTION, SOLUTION SUBCUTANEOUS at 21:27

## 2024-12-07 RX ADMIN — TAMSULOSIN HYDROCHLORIDE 0.4 MG: 0.4 CAPSULE ORAL at 21:29

## 2024-12-07 RX ADMIN — FUROSEMIDE 40 MG: 10 INJECTION, SOLUTION INTRAMUSCULAR; INTRAVENOUS at 18:32

## 2024-12-07 RX ADMIN — FAMOTIDINE 10 MG: 20 TABLET, FILM COATED ORAL at 21:29

## 2024-12-07 RX ADMIN — APIXABAN 2.5 MG: 2.5 TABLET, FILM COATED ORAL at 21:29

## 2024-12-07 NOTE — ED NOTES
Patient ambulated in room with walker, walking pulse ox noted to be 100%.     Shakila Sexton RN  12/07/24 3443

## 2024-12-07 NOTE — ED PROVIDER NOTES
Time reflects when diagnosis was documented in both MDM as applicable and the Disposition within this note       Time User Action Codes Description Comment    12/7/2024  4:55 PM Raúl Lee Add [I50.9] CHF (congestive heart failure) (HCC)           ED Disposition       None          Assessment & Plan       Medical Decision Making  Obtain blood work, EKG, chest x-ray      Patient's chest x-ray shows concern for possible left lower lobe infiltrate.  Subsequently CT chest is pending.  Lab work is essentially at baseline.  Currently obtaining ambulatory pulse ox to look for hypoxia on exertion.  Care of patient signed out to the next attending will follow-up with ambulatory pulse oximetry as well as CT study and then dispo patient appropriately.    Amount and/or Complexity of Data Reviewed  Labs: ordered.  Radiology: ordered and independent interpretation performed.        ED Course as of 12/07/24 1656   Sat Dec 07, 2024   1543 Chest x-ray shows concern for possible left lower lobe consolidation versus infiltrate.  Will obtain chest CT for further delineation.       Medications - No data to display    ED Risk Strat Scores                                               History of Present Illness       Chief Complaint   Patient presents with    Shortness of Breath     Continued swelling in legs and increase shortness of breath at rest and exhertion per home nurse per family, no pain       Past Medical History:   Diagnosis Date    Atrial fibrillation (HCC)     Chronic kidney disease     Coronary artery disease     Diabetes mellitus (HCC)     Hyperlipidemia     Hypertension       Past Surgical History:   Procedure Laterality Date    CARDIAC CATHETERIZATION N/A 6/30/2023    Procedure: Cardiac pericardiocentesis;  Surgeon: Shanna Adame DO;  Location: [facility];  Service: Cardiology    CARDIAC CATHETERIZATION N/A 6/30/2023    Procedure: Cardiac RHC;  Surgeon: Shanna Adame DO;  Location: [facility]  CATH LAB;  Service: Cardiology    EYE SURGERY      IR CHEST TUBE PLACEMENT  6/24/2023    IR CHEST TUBE PLACEMENT  7/28/2023    IR PLEURAL EFFUSION LONG-TERM CATHETER PLACEMENT  8/7/2023    IR PLEURAL EFFUSION LONG-TERM CATHETER REMOVAL  1/3/2024    IR THORACENTESIS  12/11/2023    SKIN CANCER EXCISION      TONSILLECTOMY        Family History   Problem Relation Age of Onset    Heart disease Mother     Diabetes Father     Vision loss Father     Cancer Sister     Diabetes Sister       Social History     Tobacco Use    Smoking status: Never    Smokeless tobacco: Former    Tobacco comments:     Smoked a pipe 50 years ago   Vaping Use    Vaping status: Never Used   Substance Use Topics    Alcohol use: Not Currently     Alcohol/week: 0.0 standard drinks of alcohol     Comment: special occasions    Drug use: Not Currently      E-Cigarette/Vaping    E-Cigarette Use Never User       E-Cigarette/Vaping Substances    Nicotine No     THC No     CBD No     Flavoring No     Other No     Unknown No       I have reviewed and agree with the history as documented.     84-year-old male with past history of insulin-dependent diabetes diabetes, CHF on torsemide, venous stasis ulcers to bilateral lower extremities, hypertension, hyperlipidemia, CKD, CAD, atrial fibrillation on Eliquis, presents to the ED for evaluation of worsening dyspnea on exertion, shortness of breath as well as increasing lower extremity edema.  Patient lives with son.  Son states that patient has been eating a lot more salty food due to Thanksgiving holiday and his birthday.  Patient given an extra dose of torsemide by son this morning.  Patient did gain 4 pounds over the past 3 days.  Patient was seen by visiting nurse who recommended patient come to the ED for further evaluation and management.  Patient has chronic bilateral lower extremity wounds that does not appear to be worsened compared to baseline according to son.      History provided by:   Patient  Shortness of Breath  Associated symptoms: no abdominal pain, no chest pain, no cough, no ear pain, no fever, no rash, no sore throat and no vomiting        Review of Systems   Constitutional:  Negative for chills and fever.   HENT:  Negative for ear pain and sore throat.    Eyes:  Negative for pain and visual disturbance.   Respiratory:  Positive for shortness of breath. Negative for cough.    Cardiovascular:  Positive for leg swelling. Negative for chest pain and palpitations.   Gastrointestinal:  Negative for abdominal pain and vomiting.   Genitourinary:  Negative for dysuria and hematuria.   Musculoskeletal:  Negative for arthralgias and back pain.   Skin:  Negative for color change and rash.   Neurological:  Negative for seizures and syncope.   All other systems reviewed and are negative.          Objective       ED Triage Vitals [12/07/24 1318]   Temperature Pulse Blood Pressure Respirations SpO2 Patient Position - Orthostatic VS   (!) 96.8 °F (36 °C) 72 129/59 (!) 24 100 % Sitting      Temp Source Heart Rate Source BP Location FiO2 (%) Pain Score    Temporal Monitor Left arm -- No Pain      Vitals      Date and Time Temp Pulse SpO2 Resp BP Pain Score FACES Pain Rating User   12/07/24 1645 -- 69 100 % 21 149/67 -- -- MF   12/07/24 1615 -- 74 100 % 20 125/61 -- -- AG   12/07/24 1545 -- 69 100 % 22 110/56 -- -- AG   12/07/24 1530 -- 66 100 % 21 130/63 -- -- AG   12/07/24 1515 -- 93 100 % 22 113/56 -- -- AG   12/07/24 1430 -- 77 100 % 22 138/60 -- -- AG   12/07/24 1318 96.8 °F (36 °C) 72 100 % 24 129/59 No Pain -- LS            Physical Exam  Vitals and nursing note reviewed.   Constitutional:       General: He is not in acute distress.     Appearance: He is well-developed.   HENT:      Head: Normocephalic and atraumatic.   Eyes:      Conjunctiva/sclera: Conjunctivae normal.   Cardiovascular:      Rate and Rhythm: Normal rate and regular rhythm.      Heart sounds: No murmur heard.  Pulmonary:      Effort:  Pulmonary effort is normal. No respiratory distress.      Breath sounds: Rales present.      Comments: Rales noted to bibasilar lungs.  Abdominal:      Palpations: Abdomen is soft.      Tenderness: There is no abdominal tenderness.   Musculoskeletal:         General: No swelling.      Cervical back: Neck supple.      Comments: 1+ pitting edema noted to bilateral lower extremities.  Chronic venous stasis wounds noted to bilateral lower extremity.  Please see picture below for clarification.  Wounds are not tender or warm to touch.   Skin:     General: Skin is warm and dry.      Capillary Refill: Capillary refill takes less than 2 seconds.   Neurological:      Mental Status: He is alert.   Psychiatric:         Mood and Affect: Mood normal.         Results Reviewed       Procedure Component Value Units Date/Time    HS Troponin I 2hr [[id number]] Collected: 12/07/24 1636    Lab Status: In process Specimen: Blood from Hand, Right Updated: 12/07/24 1641    HS Troponin I 4hr [[id number]]     Lab Status: No result Specimen: Blood     HS Troponin 0hr (reflex protocol) [[id number]]  (Normal) Collected: 12/07/24 1439    Lab Status: Final result Specimen: Blood from Arm, Right Updated: 12/07/24 1511     hs TnI 0hr 14 ng/L     B-Type Natriuretic Peptide(BNP) [[id number]]  (Abnormal) Collected: 12/07/24 1439    Lab Status: Final result Specimen: Blood from Arm, Right Updated: 12/07/24 1509      pg/mL     Comprehensive metabolic panel [[id number]]  (Abnormal) Collected: 12/07/24 1439    Lab Status: Final result Specimen: Blood from Arm, Right Updated: 12/07/24 1508     Sodium 131 mmol/L      Potassium 4.6 mmol/L      Chloride 97 mmol/L      CO2 27 mmol/L      ANION GAP 7 mmol/L      BUN 63 mg/dL      Creatinine 1.97 mg/dL      Glucose 244 mg/dL      Calcium 8.4 mg/dL      Corrected Calcium 9.0 mg/dL      AST 24 U/L      ALT 24 U/L      Alkaline Phosphatase 141 U/L      Total Protein 6.1 g/dL      Albumin 3.3 g/dL      Total  Bilirubin 0.69 mg/dL      eGFR 29 ml/min/1.73sq m     Narrative:      National Kidney Disease Foundation guidelines for Chronic Kidney Disease (CKD):     Stage 1 with normal or high GFR (GFR > 90 mL/min/1.73 square meters)    Stage 2 Mild CKD (GFR = 60-89 mL/min/1.73 square meters)    Stage 3A Moderate CKD (GFR = 45-59 mL/min/1.73 square meters)    Stage 3B Moderate CKD (GFR = 30-44 mL/min/1.73 square meters)    Stage 4 Severe CKD (GFR = 15-29 mL/min/1.73 square meters)    Stage 5 End Stage CKD (GFR <15 mL/min/1.73 square meters)  Note: GFR calculation is accurate only with a steady state creatinine    Magnesium [[id number]]  (Normal) Collected: 12/07/24 1439    Lab Status: Final result Specimen: Blood from Arm, Right Updated: 12/07/24 1508     Magnesium 2.2 mg/dL     FLU/COVID Rapid Antigen (30 min. TAT) - Preferred screening test in ED [[id number]]  (Normal) Collected: 12/07/24 1439    Lab Status: Final result Specimen: Nares from Nose Updated: 12/07/24 1506     SARS COV Rapid Antigen Negative     Influenza A Rapid Antigen Negative     Influenza B Rapid Antigen Negative    Narrative:      This test has been performed using the Quidel Maura 2 FLU+SARS Antigen test under the Emergency Use Authorization (EUA). This test has been validated by the  and verified by the performing laboratory. The Maura uses lateral flow immunofluorescent sandwich assay to detect SARS-COV, Influenza A and Influenza B Antigen.     The Quidel Maura 2 SARS Antigen test does not differentiate between SARS-CoV and SARS-CoV-2.     Negative results are presumptive and may be confirmed with a molecular assay, if necessary, for patient management. Negative results do not rule out SARS-CoV-2 or influenza infection and should not be used as the sole basis for treatment or patient management decisions. A negative test result may occur if the level of antigen in a sample is below the limit of detection of this test.     Positive results are  indicative of the presence of viral antigens, but do not rule out bacterial infection or co-infection with other viruses.     All test results should be used as an adjunct to clinical observations and other information available to the provider.    FOR PEDIATRIC PATIENTS - copy/paste COVID Guidelines URL to browser: [email or web address]    CBC and differential [[id number]]  (Abnormal) Collected: 12/07/24 1439    Lab Status: Final result Specimen: Blood from Arm, Right Updated: 12/07/24 1458     WBC 6.13 Thousand/uL      RBC 3.38 Million/uL      Hemoglobin 10.9 g/dL      Hematocrit 34.0 %       fL      MCH 32.2 pg      MCHC 32.1 g/dL      RDW 16.5 %      MPV 9.7 fL      Platelets 100 Thousands/uL      nRBC 0 /100 WBCs      Segmented % 77 %      Immature Grans % 1 %      Lymphocytes % 12 %      Monocytes % 10 %      Eosinophils Relative 0 %      Basophils Relative 0 %      Absolute Neutrophils 4.73 Thousands/µL      Absolute Immature Grans 0.03 Thousand/uL      Absolute Lymphocytes 0.71 Thousands/µL      Absolute Monocytes 0.62 Thousand/µL      Eosinophils Absolute 0.02 Thousand/µL      Basophils Absolute 0.02 Thousands/µL     UA w Reflex to Microscopic w Reflex to Culture [[id number]]     Lab Status: No result Specimen: Urine             XR chest 1 view portable   ED Interpretation by Raúl Lee DO (12/07 1543)   Chest x-ray shows concern for possible left lower lobe consolidation versus infiltrate.  Formal radiology reading pending.      CT chest without contrast    (Results Pending)       ECG 12 Lead Documentation Only    Date/Time: 12/7/2024 2:21 PM    Performed by: Raúl Lee DO  Authorized by: Raúl Lee DO    Indications / Diagnosis:  Shortness of breath  ECG reviewed by me, the ED Provider: yes    Patient location:  ED  Previous ECG:     Previous ECG:  Compared to current    Similarity:  Changes noted    Comparison to cardiac monitor:  Yes    Interpretation:     Interpretation: abnormal    Comments:      Irregularly irregular rhythm, rate 77, normal axis, normal intervals, no acute ST/T wave abnormalities noted, atrial fibrillation with occasional PVCs noted, PVCs are new compared to previous EKG.      ED Medication and Procedure Management   Prior to Admission Medications   Prescriptions Last Dose Informant Patient Reported? Taking?   ALPRAZolam (XANAX) 0.5 mg tablet  Child Yes No   Si.5 mg daily at bedtime as needed for anxiety or sleep   Patient not taking: Reported on 2024   Blood Pressure Monitor NILTON  Child No No   Sig: by Does not apply route daily   Semglee, yfgn, 100 UNIT/ML SOPN  Child Yes No   Sure Comfort Pen Needles 32G X 4 MM MISC   Yes No   ZINC OXIDE PO  Child Yes No   Sig: Take by mouth daily   allopurinol (ZYLOPRIM) 100 mg tablet  Child Yes No   Sig: Take 100 mg by mouth 2 (two) times a day   apixaban (Eliquis) 2.5 mg   No No   Sig: Take 1 tablet (2.5 mg total) by mouth 2 (two) times a day   ascorbic acid (VITAMIN C) 500 MG tablet  Child No No   Sig: Take 1 tablet (500 mg total) by mouth daily   atorvastatin (LIPITOR) 40 mg tablet  Child No No   Sig: Take 1 tablet (40 mg total) by mouth daily with dinner   cholecalciferol (VITAMIN D3) 1,000 units tablet  Child No No   Sig: Take 2 tablets (2,000 Units total) by mouth daily Do not start before 2023.   docusate sodium (COLACE) 100 mg capsule  Child No No   Sig: Take 1 capsule (100 mg total) by mouth 2 (two) times a day as needed for constipation   Patient not taking: Reported on 2024   ferrous sulfate 325 (65 Fe) mg tablet  Child Yes No   Sig: Take 325 mg by mouth daily with breakfast   glimepiride (AMARYL) 2 mg tablet  Child No No   Sig: Take 1 tablet (2 mg total) by mouth daily with dinner   glimepiride (AMARYL) 4 mg tablet   Yes No   Sig: Take 4 mg by mouth daily   halobetasol (ULTRAVATE) 0.05 % cream  Child Yes No   insulin aspart (NovoLOG FlexPen) 100  UNIT/ML injection pen   Yes No   insulin lispro (HumaLOG) 100 units/mL injection  Child No No   Sig: Inject 1-5 Units under the skin 3 (three) times a day before meals   insulin lispro (HumaLOG) 100 units/mL injection  Child No No   Sig: Inject 1-5 Units under the skin daily at bedtime   latanoprost (XALATAN) 0.005 % ophthalmic solution  Child Yes No   Si drop daily at bedtime   tamsulosin (FLOMAX) 0.4 mg  Child Yes No   Sig: Take 0.4 mg by mouth daily with dinner   timolol (TIMOPTIC) 0.5 % ophthalmic solution  Child No No   Sig: Administer 1 drop to both eyes daily   torsemide (DEMADEX) 20 mg tablet   Yes No      Facility-Administered Medications: None     Patient's Medications   Discharge Prescriptions    No medications on file     No discharge procedures on file.  ED SEPSIS DOCUMENTATION   Time reflects when diagnosis was documented in both MDM as applicable and the Disposition within this note       Time User Action Codes Description Comment    2024  4:55 PM Raúl Lee Add [I50.9] CHF (congestive heart failure) ([facility])                  Raúl Lee DO  24 1656       Raúl Lee DO  24 1701

## 2024-12-08 PROBLEM — R33.8 ACUTE URINARY RETENTION: Status: ACTIVE | Noted: 2023-02-25

## 2024-12-08 PROBLEM — E87.1 HYPONATREMIA: Status: RESOLVED | Noted: 2024-12-07 | Resolved: 2024-12-08

## 2024-12-08 LAB
ANION GAP SERPL CALCULATED.3IONS-SCNC: 5 MMOL/L (ref 4–13)
ATRIAL RATE: 416 BPM
BUN SERPL-MCNC: 65 MG/DL (ref 5–25)
CALCIUM SERPL-MCNC: 8.3 MG/DL (ref 8.4–10.2)
CHLORIDE SERPL-SCNC: 100 MMOL/L (ref 96–108)
CO2 SERPL-SCNC: 32 MMOL/L (ref 21–32)
CREAT SERPL-MCNC: 1.95 MG/DL (ref 0.6–1.3)
D DIMER PPP FEU-MCNC: 2.26 UG/ML FEU
ERYTHROCYTE [DISTWIDTH] IN BLOOD BY AUTOMATED COUNT: 16.5 % (ref 11.6–15.1)
EST. AVERAGE GLUCOSE BLD GHB EST-MCNC: 174 MG/DL
GFR SERPL CREATININE-BSD FRML MDRD: 30 ML/MIN/1.73SQ M
GLUCOSE P FAST SERPL-MCNC: 108 MG/DL (ref 65–99)
GLUCOSE SERPL-MCNC: 108 MG/DL (ref 65–140)
GLUCOSE SERPL-MCNC: 117 MG/DL (ref 65–140)
GLUCOSE SERPL-MCNC: 120 MG/DL (ref 65–140)
GLUCOSE SERPL-MCNC: 160 MG/DL (ref 65–140)
GLUCOSE SERPL-MCNC: 65 MG/DL (ref 65–140)
GLUCOSE SERPL-MCNC: 72 MG/DL (ref 65–140)
HBA1C MFR BLD: 7.7 %
HCT VFR BLD AUTO: 30.5 % (ref 36.5–49.3)
HGB BLD-MCNC: 9.9 G/DL (ref 12–17)
MAGNESIUM SERPL-MCNC: 2.2 MG/DL (ref 1.9–2.7)
MCH RBC QN AUTO: 32.5 PG (ref 26.8–34.3)
MCHC RBC AUTO-ENTMCNC: 32.5 G/DL (ref 31.4–37.4)
MCV RBC AUTO: 100 FL (ref 82–98)
PLATELET # BLD AUTO: 93 THOUSANDS/UL (ref 149–390)
PMV BLD AUTO: 9.6 FL (ref 8.9–12.7)
POTASSIUM SERPL-SCNC: 4.3 MMOL/L (ref 3.5–5.3)
QRS AXIS: 33 DEGREES
QRSD INTERVAL: 88 MS
QT INTERVAL: 408 MS
QTC INTERVAL: 461 MS
RBC # BLD AUTO: 3.05 MILLION/UL (ref 3.88–5.62)
SODIUM SERPL-SCNC: 137 MMOL/L (ref 135–147)
T WAVE AXIS: 43 DEGREES
VENTRICULAR RATE: 77 BPM
WBC # BLD AUTO: 4.19 THOUSAND/UL (ref 4.31–10.16)

## 2024-12-08 PROCEDURE — 93010 ELECTROCARDIOGRAM REPORT: CPT | Performed by: INTERNAL MEDICINE

## 2024-12-08 PROCEDURE — 80048 BASIC METABOLIC PNL TOTAL CA: CPT | Performed by: NURSE PRACTITIONER

## 2024-12-08 PROCEDURE — 85379 FIBRIN DEGRADATION QUANT: CPT | Performed by: NURSE PRACTITIONER

## 2024-12-08 PROCEDURE — 85027 COMPLETE CBC AUTOMATED: CPT | Performed by: NURSE PRACTITIONER

## 2024-12-08 PROCEDURE — 93005 ELECTROCARDIOGRAM TRACING: CPT

## 2024-12-08 PROCEDURE — 83735 ASSAY OF MAGNESIUM: CPT | Performed by: NURSE PRACTITIONER

## 2024-12-08 PROCEDURE — 99223 1ST HOSP IP/OBS HIGH 75: CPT | Performed by: INTERNAL MEDICINE

## 2024-12-08 PROCEDURE — 99232 SBSQ HOSP IP/OBS MODERATE 35: CPT | Performed by: INTERNAL MEDICINE

## 2024-12-08 PROCEDURE — 82948 REAGENT STRIP/BLOOD GLUCOSE: CPT

## 2024-12-08 PROCEDURE — 99215 OFFICE O/P EST HI 40 MIN: CPT | Performed by: INTERNAL MEDICINE

## 2024-12-08 RX ORDER — ECHINACEA PURPUREA EXTRACT 125 MG
1 TABLET ORAL
Status: DISCONTINUED | OUTPATIENT
Start: 2024-12-08 | End: 2024-12-15 | Stop reason: HOSPADM

## 2024-12-08 RX ORDER — FUROSEMIDE 10 MG/ML
40 INJECTION INTRAMUSCULAR; INTRAVENOUS
Status: DISCONTINUED | OUTPATIENT
Start: 2024-12-08 | End: 2024-12-08

## 2024-12-08 RX ORDER — ALBUMIN (HUMAN) 12.5 G/50ML
25 SOLUTION INTRAVENOUS
Status: DISCONTINUED | OUTPATIENT
Start: 2024-12-08 | End: 2024-12-08

## 2024-12-08 RX ORDER — FUROSEMIDE 10 MG/ML
40 INJECTION INTRAMUSCULAR; INTRAVENOUS
Status: DISCONTINUED | OUTPATIENT
Start: 2024-12-08 | End: 2024-12-11

## 2024-12-08 RX ORDER — FUROSEMIDE 10 MG/ML
20 INJECTION INTRAMUSCULAR; INTRAVENOUS
Status: DISCONTINUED | OUTPATIENT
Start: 2024-12-08 | End: 2024-12-08

## 2024-12-08 RX ADMIN — Medication 2000 UNITS: at 08:27

## 2024-12-08 RX ADMIN — INSULIN GLARGINE 5 UNITS: 100 INJECTION, SOLUTION SUBCUTANEOUS at 21:29

## 2024-12-08 RX ADMIN — LATANOPROST 1 DROP: 50 SOLUTION OPHTHALMIC at 21:29

## 2024-12-08 RX ADMIN — FUROSEMIDE 20 MG: 10 INJECTION, SOLUTION INTRAMUSCULAR; INTRAVENOUS at 08:32

## 2024-12-08 RX ADMIN — FERROUS SULFATE TAB 325 MG (65 MG ELEMENTAL FE) 325 MG: 325 (65 FE) TAB at 08:27

## 2024-12-08 RX ADMIN — FUROSEMIDE 40 MG: 10 INJECTION, SOLUTION INTRAMUSCULAR; INTRAVENOUS at 17:05

## 2024-12-08 RX ADMIN — FAMOTIDINE 10 MG: 20 TABLET, FILM COATED ORAL at 21:30

## 2024-12-08 RX ADMIN — OXYCODONE HYDROCHLORIDE AND ACETAMINOPHEN 500 MG: 500 TABLET ORAL at 08:27

## 2024-12-08 RX ADMIN — APIXABAN 2.5 MG: 2.5 TABLET, FILM COATED ORAL at 17:05

## 2024-12-08 RX ADMIN — INSULIN LISPRO 1 UNITS: 100 INJECTION, SOLUTION INTRAVENOUS; SUBCUTANEOUS at 17:05

## 2024-12-08 RX ADMIN — LATANOPROST 1 DROP: 50 SOLUTION OPHTHALMIC at 00:15

## 2024-12-08 RX ADMIN — ALLOPURINOL 100 MG: 100 TABLET ORAL at 17:05

## 2024-12-08 RX ADMIN — TIMOLOL MALEATE 1 DROP: 5 SOLUTION OPHTHALMIC at 08:31

## 2024-12-08 RX ADMIN — ATORVASTATIN CALCIUM 40 MG: 40 TABLET, FILM COATED ORAL at 17:05

## 2024-12-08 RX ADMIN — ALBUMIN (HUMAN) 25 G: 0.25 INJECTION, SOLUTION INTRAVENOUS at 08:27

## 2024-12-08 RX ADMIN — APIXABAN 2.5 MG: 2.5 TABLET, FILM COATED ORAL at 08:27

## 2024-12-08 RX ADMIN — TAMSULOSIN HYDROCHLORIDE 0.4 MG: 0.4 CAPSULE ORAL at 17:05

## 2024-12-08 RX ADMIN — ALLOPURINOL 100 MG: 100 TABLET ORAL at 08:27

## 2024-12-08 RX ADMIN — ZINC SULFATE 220 MG (50 MG) CAPSULE 220 MG: CAPSULE at 08:27

## 2024-12-08 NOTE — PLAN OF CARE
Problem: PAIN - ADULT  Goal: Verbalizes/displays adequate comfort level or baseline comfort level  Description: Interventions:  - Encourage patient to monitor pain and request assistance  - Assess pain using appropriate pain scale  - Administer analgesics based on type and severity of pain and evaluate response  - Implement non-pharmacological measures as appropriate and evaluate response  - Consider cultural and social influences on pain and pain management  - Notify physician/advanced practitioner if interventions unsuccessful or patient reports new pain  Outcome: Progressing     Problem: INFECTION - ADULT  Goal: Absence or prevention of progression during hospitalization  Description: INTERVENTIONS:  - Assess and monitor for signs and symptoms of infection  - Monitor lab/diagnostic results  - Monitor all insertion sites, i.e. indwelling lines, tubes, and drains  - Monitor endotracheal if appropriate and nasal secretions for changes in amount and color  - [place] appropriate cooling/warming therapies per order  - Administer medications as ordered  - Instruct and encourage patient and family to use good hand hygiene technique  - Identify and instruct in appropriate isolation precautions for identified infection/condition  Outcome: Progressing  Goal: Absence of fever/infection during neutropenic period  Description: INTERVENTIONS:  - Monitor WBC    Outcome: Progressing     Problem: SAFETY ADULT  Goal: Patient will remain free of falls  Description: INTERVENTIONS:  - Educate patient/family on patient safety including physical limitations  - Instruct patient to call for assistance with activity   - Consult OT/PT to assist with strengthening/mobility   - Keep Call bell within reach  - Keep bed low and locked with side rails adjusted as appropriate  - Keep care items and personal belongings within reach  - Initiate and maintain comfort rounds  - Make Fall Risk Sign visible to staff  - Offer Toileting every 2 Hours,  in advance of need  - Initiate/Maintain bed alarm  - Obtain necessary fall risk management equipment: yellow socks  - Apply yellow socks and bracelet for high fall risk patients  - Consider moving patient to room near nurses station  Outcome: Progressing  Goal: Maintain or return to baseline ADL function  Description: INTERVENTIONS:  -  Assess patient's ability to carry out ADLs; assess patient's baseline for ADL function and identify physical deficits which impact ability to perform ADLs (bathing, care of mouth/teeth, toileting, grooming, dressing, etc.)  - Assess/evaluate cause of self-care deficits   - Assess range of motion  - Assess patient's mobility; develop plan if impaired  - Assess patient's need for assistive devices and provide as appropriate  - Encourage maximum independence but intervene and supervise when necessary  - Involve family in performance of ADLs  - Assess for home care needs following discharge   - Consider OT consult to assist with ADL evaluation and planning for discharge  - Provide patient education as appropriate  Outcome: Progressing  Goal: Maintains/Returns to pre admission functional level  Description: INTERVENTIONS:  - Perform AM-PAC 6 Click Basic Mobility/ Daily Activity assessment daily.  - Set and communicate daily mobility goal to care team and patient/family/caregiver.   - Collaborate with rehabilitation services on mobility goals if consulted  - Perform Range of Motion 3 times a day.  - Reposition patient every 2 hours.  - Dangle patient 3 times a day  - Stand patient 3 times a day  - Ambulate patient 3 times a day  - Out of bed to chair 3 times a day   - Out of bed for meals 3 times a day  - Out of bed for toileting  - Record patient progress and toleration of activity level   Outcome: Progressing     Problem: DISCHARGE PLANNING  Goal: Discharge to home or other facility with appropriate resources  Description: INTERVENTIONS:  - Identify barriers to discharge w/patient and  caregiver  - Arrange for needed discharge resources and transportation as appropriate  - Identify discharge learning needs (meds, wound care, etc.)  - Arrange for interpretive services to assist at discharge as needed  - Refer to Case Management Department for coordinating discharge planning if the patient needs post-hospital services based on physician/advanced practitioner order or complex needs related to functional status, cognitive ability, or social support system  Outcome: Progressing     Problem: Knowledge Deficit  Goal: Patient/family/caregiver demonstrates understanding of disease process, treatment plan, medications, and discharge instructions  Description: Complete learning assessment and assess knowledge base.  Interventions:  - Provide teaching at level of understanding  - Provide teaching via preferred learning methods  Outcome: Progressing     Problem: Decreased Cardiac Output  Goal: Cardiac output adequate for individual needs  Description: INTERVENTIONS: Monitor for signs and symptoms of decreased cardiac output   - Monitor for dyspnea with exertion and at rest  - Monitor for orthopnea  - Monitor for signs of tachycardia. Place patient on telemetry monitoring.  - Assess patient for jugular vein distention  - Assess patient for lower extremity edema and poor peripheral perfusion   - Auscultate lung sound for Fine bibasilar crackles   - Monitor for cardiac arrythmias   - Administer beta blockers, antiarrhythmic, and blood pressure medications as ordered    Outcome: Progressing     Problem: Impaired Gas Exchange  Goal: Optimize oxygenation and ensure adequate ventilation  Description: INTERVENTIONS: Monitor for signs and symptoms of respiratory distress                - Elevate HOB or use high fowlers to promote lung expansion                - Administer oxygen as ordered to maintain adequate oxygenation                - Encourage use of IS to promote lung expansion and prevent PN                -  Monitor ABGs to assess oxygenation status                - Monitor blood oxygen level to maintain adequate oxygenation                - Encourage cough and deep breathing exercises to promote lung expansion                - Monitor patient's mental status for increased confusion    Outcome: Progressing     Problem: Excess Fluid Volume  Goal: Patient is able to achieve and maintain homeostasis  Description: INTERVENTIONS: Monitor for sign and symptoms of fluid overload  - Evaluate LE edema every shift  - Elevate LE to prevent dependent edema  - Apply SHIV stockings as ordered   - Monitor ankle circumference daily  - Assess for jugular vein distention  - Evaluate provider orders for the CHF diuretic algorithm. Administer diuretics as ordered  - Weigh the patient daily at 0600 and report a weight gain of five pounds or more   - Strict intake and output  - Monitor fluid intake and adhere to fluid restrictions  - Assess lung sounds every shift and as needed  - Monitor vital signs and lab values (CBC, chem, BUN, BNP)  - Measure and document urine output    Outcome: Progressing     Problem: Activity Intolerance  Goal: Patient is able to perform activities within their limitations  Description: INTERVENTIONS:                       -   Alternate periods of activity with periods of rest                 -   Patients is able to maintain normal vitals heart rhythm during activity                 -   Gradually increase activity and exercise as patient can tolerate                 -   Monitor blood pressure and heart before and after exercise                  -   Monitor blood oxygen saturation during activity and apply oxygen as needed    Outcome: Progressing     Problem: Knowledge Deficit  Goal: Patient is able to verbalize understanding of Heart Failure after education  Description: INTERVENTIONS:  - Educate the patient and family on signs and symptoms of HF  - Provide the patient with HF education and HF zone tool  - Educate on  the importance of daily weight in the AM and reporting a weight gain               of 3 or more pounds to their primary care physician  - Monitor for SOB  - Maintain and sodium and fluid restriction  - Educate the patient on the importance of medications such as: diuretics, betablockers,               antiarrhythmics and their purpose, dose, route, side effects and labs               if they are needed    Outcome: Progressing

## 2024-12-08 NOTE — ASSESSMENT & PLAN NOTE
Wt Readings from Last 3 Encounters:   12/08/24 66.6 kg (146 lb 13.2 oz)   08/19/24 67.2 kg (148 lb 3.2 oz)   08/08/24 66.7 kg (147 lb)   -- proBNP 209  -- Noted weight increase from April.  -- Patient had high salt intake during Thanksgiving  -- Renal function currently stable, below baseline  -- Discontinue IV albumin  -- Increase IV Lasix to 40 mg twice daily  -- Low 2 g sodium diet  -- Daily weights  -- Fluid restriction

## 2024-12-08 NOTE — ASSESSMENT & PLAN NOTE
Lab Results   Component Value Date    EGFR 29 12/07/2024    EGFR 27 08/24/2024    EGFR 33 08/21/2024    CREATININE 1.97 (H) 12/07/2024    CREATININE 2.10 (H) 08/24/2024    CREATININE 1.79 (H) 08/21/2024   Baseline creatinine appears to be around 1.8-2.2 in past year.  Creatinine today 1.97, stable  Consult nephrology for diuretic management

## 2024-12-08 NOTE — ASSESSMENT & PLAN NOTE
Wt Readings from Last 3 Encounters:   12/08/24 66.6 kg (146 lb 13.2 oz)   08/19/24 67.2 kg (148 lb 3.2 oz)   08/08/24 66.7 kg (147 lb)       family notes patient has been eating salty foods since Thanksgiving  will gently diuresis with albumin 25%/25 g BID followed by Lasix 20 mg IV BID  close monitoring of his I and O, daily weights and labs  not on ACE/ARB/Arni or Aldactone secondary to stage IV kidney disease  not on SGL T2 inhibitor secondary to kidney disease  not on beta blocker secondary to bradycardia  low sodium diet  CHF education

## 2024-12-08 NOTE — ASSESSMENT & PLAN NOTE
Wt Readings from Last 3 Encounters:   12/08/24 66.6 kg (146 lb 13.2 oz)   08/19/24 67.2 kg (148 lb 3.2 oz)   08/08/24 66.7 kg (147 lb)     History of CKD 4 diabetes mellitus BPH atrial fibrillation and combined CHF who presents to the hospital for worsening leg edema  Nephrology and cardiology consulted.  There is history of dietary indiscretion including consumption of salt food  IV diuresis tailored to 40 mg furosemide twice daily

## 2024-12-08 NOTE — ASSESSMENT & PLAN NOTE
Lab Results   Component Value Date    EGFR 30 12/08/2024    EGFR 29 12/07/2024    EGFR 27 08/24/2024    CREATININE 1.95 (H) 12/08/2024    CREATININE 1.97 (H) 12/07/2024    CREATININE 2.10 (H) 08/24/2024   baseline creatinine appears to be 1.8 to 2.1  continue to monitor with diuresis  nephrology consulted

## 2024-12-08 NOTE — ASSESSMENT & PLAN NOTE
Lab Results   Component Value Date    HGBA1C 7.2 (H) 08/19/2024     Recent Labs     12/07/24  2106 12/08/24  0719 12/08/24  0749 12/08/24  1115   POCGLU 215* 65 72 120     Prior to admission on glimepiride.  Placed on low-dose glargine with sliding scale insulin during hospitalization

## 2024-12-08 NOTE — ASSESSMENT & PLAN NOTE
Wt Readings from Last 3 Encounters:   12/07/24 66.7 kg (147 lb)   08/19/24 67.2 kg (148 lb 3.2 oz)   08/08/24 66.7 kg (147 lb)     Patient presents with heavy breathing mostly with exertion noticed by visiting nurse and son for about 1 week. Patient was advised to come to ED by visiting nurse today per son.  Patient has chronic bilateral lower extremity edema for a few years per son.  Had a ruptured blister on both legs for a few months per son, doing wound care at home.  Patient himself denies SOB.  Satting 100% on room air in ED.  Chart reviewed.  History of chronic combined CHF, CKD 4, chronic A-fib, type 2 diabetes.  History of right-sided empyema, required chest tube in June 2023.  History of right indwelling pleural catheter placement in August 2023 for recurrent right exudative pleural effusion, it was removed in 1/2024 due to nonfunctioning.  Patient is on Demadex 60mg p.o. daily at home.  CT chest showed - Small right-sided pleural effusion is increased in size since the prior examination from 8/7/2023 with removal of previously seen drainage catheters. Surrounding pleural thickening is favored to be on the basis of chronicity versus possibly infection in the proper clinical setting. Moderate left pleural effusion is similar to the prior exam.Stable right middle and lower lobe areas of rounded atelectasis. Small pericardial effusion, improved.  Limited 2D echo in July 2023 showed normal EF, concentric remodeling, biatrial dilation, no pericardial effusion, markedly thickened pericardium.    2+ bilateral lower extremity edema,3+ bilateral pedal edema on exam.  Chronic per son at bedside.  Given Lasix 40 mg IV in ED.  Will order Lasix 100 mg IV twice daily based on protocol.  Update 2D echo  Telemetry.  EKG showed controlled A-fib with occasional PVCs, no acute ST/T wave abnormalities  Cardiac diet with fluid restriction 1800 cc/day  Daily weight, intake and output  Incentive spirometer  Consult  cardiology

## 2024-12-08 NOTE — H&P
H&P - Hospitalist   Name: Yao Tipton 84 y.o. male I MRN: [id number]  Unit/Bed#: [address] [place] [address] Date of Admission: 12/7/2024   Date of Service: 12/7/2024 I Hospital Day: 0     Assessment & Plan  Acute on chronic combined systolic and diastolic congestive heart failure (HCC)  Wt Readings from Last 3 Encounters:   12/07/24 66.7 kg (147 lb)   08/19/24 67.2 kg (148 lb 3.2 oz)   08/08/24 66.7 kg (147 lb)     Patient presents with heavy breathing mostly with exertion noticed by visiting nurse and son for about 1 week. Patient was advised to come to ED by visiting nurse today per son.  Patient has chronic bilateral lower extremity edema for a few years per son.  Had a ruptured blister on both legs for a few months per son, doing wound care at home.  Patient himself denies SOB.  Satting 100% on room air in ED.  Chart reviewed.  History of chronic combined CHF, CKD 4, chronic A-fib, type 2 diabetes.  History of right-sided empyema, required chest tube in June 2023.  History of right indwelling pleural catheter placement in August 2023 for recurrent right exudative pleural effusion, it was removed in 1/2024 due to nonfunctioning.  Patient is on Demadex 60mg p.o. daily at home.  CT chest showed - Small right-sided pleural effusion is increased in size since the prior examination from 8/7/2023 with removal of previously seen drainage catheters. Surrounding pleural thickening is favored to be on the basis of chronicity versus possibly infection in the proper clinical setting. Moderate left pleural effusion is similar to the prior exam.Stable right middle and lower lobe areas of rounded atelectasis. Small pericardial effusion, improved.  Limited 2D echo in July 2023 showed normal EF, concentric remodeling, biatrial dilation, no pericardial effusion, markedly thickened pericardium.    2+ bilateral lower extremity edema,3+ bilateral pedal edema on exam.  Chronic per son at bedside.  Given Lasix 40 mg IV in ED.  Will  "order Lasix 100 mg IV twice daily based on protocol.  Update 2D echo  Telemetry.  EKG showed controlled A-fib with occasional PVCs, no acute ST/T wave abnormalities  Cardiac diet with fluid restriction 1800 cc/day  Daily weight, intake and output  Incentive spirometer  Consult cardiology  Hyponatremia  Corrected sodium 134  Repeat lab in the morning    Chronic kidney disease, stage 4 (severe) ([facility])  Lab Results   Component Value Date    EGFR 29 12/07/2024    EGFR 27 08/24/2024    EGFR 33 08/21/2024    CREATININE 1.97 (H) 12/07/2024    CREATININE 2.10 (H) 08/24/2024    CREATININE 1.79 (H) 08/21/2024   Baseline creatinine appears to be around 1.8-2.2 in past year.  Creatinine today 1.97, stable  Consult nephrology for diuretic management    Open wound of both lower extremities  Ruptured blisters on both legs for a few months per son.  Local care  Consult care  Elevate legs  Essential hypertension  On Demadex at home.  BP acceptable  Diabetes mellitus with peripheral artery disease ([facility])  Lab Results   Component Value Date    HGBA1C 7.2 (H) 08/19/2024       No results for input(s): \"POCGLU\" in the last 72 hours.    Blood Sugar Average: Last 72 hrs:  On glimepiride 2 mg p.o. daily with dinner, Humalog SSI ACHS, Semglee 2-3 units at bedtime based on blood sugar level at home per son  Will order Lantus 5 units at bedtime  SSI ACHS  Diabetic diet  Update A1c    Chronic atrial fibrillation (HCC)  On Eliquis at home.  Will continue.  Optimize electrolytes  Telemetry  Chronic anemia  Likely due to CKD 4.  On iron supplement at home.  Will continue.  Based hemoglobin 10-12  Hemoglobin stable, monitor  Thrombocytopenia (HCC)  Baseline platelet count 101 - 143 in past year  Platelet count 100 today  Monitor count  Duodenal ulcer  History of Beck's esophagus, large duodenal ulcer in July 2023  Not on PPI at home currently  Will order Pepcid for GI prophylaxis while inpatient  Dyslipidemia  Continue statin  BPH (benign " prostatic hyperplasia)  Continue Flomax  History of urinary retention required Dupont catheter last year per son.  Gout  Continue allopurinol      VTE Pharmacologic Prophylaxis: On Eliquis  Code Status: Level 3 - DNAR and DNI   Discussion with family: Updated  (son) at bedside.    Anticipated Length of Stay: Patient will be admitted on an observation basis with an anticipated length of stay of less than 2 midnights secondary to CHF exacerbation.    History of Present Illness   Chief Complaint: SOB    Yao Tipton is a 84 y.o. male with a PMH of chronic combined CHF, chronic A-fib, hypertension, hyperlipidemia, type 2 diabetes, gout, urine ulcer, Beck's esophagus, BPH, anemia who presents with heavy breathing mostly with exertion noticed by visiting nurse and son for about 1 week. Patient was advised to come to ED by visiting nurse today per son.  Patient has chronic bilateral lower extremity edema for a few years per son, not worsened from baseline.  Patient had a ruptured blister on both legs for a few months per son, doing wound care at home.  Patient himself denies SOB.  Patient denies cough, fever or chills.  Patient denies chest pain, headache dizziness, nausea vomiting diarrhea constipation.  Patient appetite is so-so at home per son.  Patient ambulates with walker at home independently per son.  No other complaints from son or patient.      Review of Systems   Unable to perform ROS: Age (Spoke to son at bedside)   Respiratory:  Positive for shortness of breath.    Skin:  Positive for wound.   All other systems reviewed and are negative.      Historical Information   Past Medical History:   Diagnosis Date    Atrial fibrillation (HCC)     BPH (benign prostatic hyperplasia)     CHF (congestive heart failure) (HCC)     Chronic kidney disease     Coronary artery disease     Diabetes mellitus (HCC)     Hyperlipidemia     Hypertension      Past Surgical History:   Procedure Laterality Date     CARDIAC CATHETERIZATION N/A 6/30/2023    Procedure: Cardiac pericardiocentesis;  Surgeon: Shanna Adame DO;  Location: [facility];  Service: Cardiology    CARDIAC CATHETERIZATION N/A 6/30/2023    Procedure: Cardiac RHC;  Surgeon: Shanna Adame DO;  Location: [facility];  Service: Cardiology    EYE SURGERY      IR CHEST TUBE PLACEMENT  6/24/2023    IR CHEST TUBE PLACEMENT  7/28/2023    IR PLEURAL EFFUSION LONG-TERM CATHETER PLACEMENT  8/7/2023    IR PLEURAL EFFUSION LONG-TERM CATHETER REMOVAL  1/3/2024    IR THORACENTESIS  12/11/2023    SKIN CANCER EXCISION      TONSILLECTOMY       Social History     Tobacco Use    Smoking status: Never    Smokeless tobacco: Former    Tobacco comments:     Smoked a pipe 50 years ago   Vaping Use    Vaping status: Never Used   Substance and Sexual Activity    Alcohol use: Not Currently     Alcohol/week: 0.0 standard drinks of alcohol     Comment: special occasions    Drug use: Not Currently    Sexual activity: Not on file     E-Cigarette/Vaping    E-Cigarette Use Never User      E-Cigarette/Vaping Substances    Nicotine No     THC No     CBD No     Flavoring No     Other No     Unknown No      Family history non-contributory  Social History:  Marital Status:    Occupation: Retired  Patient Pre-hospital Living Situation: Home  Patient Pre-hospital Level of Mobility: walks with walker  Patient Pre-hospital Diet Restrictions: Diabetic cardiac    Meds/Allergies   I have reviewed home medications with patient family member.  Prior to Admission medications    Medication Sig Start Date End Date Taking? Authorizing Provider   allopurinol (ZYLOPRIM) 100 mg tablet Take 100 mg by mouth 2 (two) times a day   Yes Historical Provider, MD   apixaban (Eliquis) 2.5 mg Take 1 tablet (2.5 mg total) by mouth 2 (two) times a day 8/8/24  Yes SUSANA Kaur   ascorbic acid (VITAMIN C) 500 MG tablet Take 1 tablet (500 mg total) by mouth daily 1/8/21  Yes Anna  MD Nabila   atorvastatin (LIPITOR) 40 mg tablet Take 1 tablet (40 mg total) by mouth daily with dinner 1/16/19  Yes Phoebe Perez DO   cholecalciferol (VITAMIN D3) 1,000 units tablet Take 2 tablets (2,000 Units total) by mouth daily Do not start before July 12, 2023. 7/12/23 12/7/24 Yes Yousuf Pressley MD   ferrous sulfate 325 (65 Fe) mg tablet Take 325 mg by mouth daily with breakfast   Yes Historical Provider, MD   glimepiride (AMARYL) 2 mg tablet Take 1 tablet (2 mg total) by mouth daily with dinner 2/27/23  Yes Anna Dahl MD   insulin lispro (HumaLOG) 100 units/mL injection Inject 1-5 Units under the skin 3 (three) times a day before meals 11/22/23  Yes SUSANA Mcfarlane   insulin lispro (HumaLOG) 100 units/mL injection Inject 1-5 Units under the skin daily at bedtime 11/22/23  Yes SUSANA Mcfarlane   latanoprost (XALATAN) 0.005 % ophthalmic solution Administer 1 drop to both eyes daily at bedtime   Yes Historical Provider, MD Alexander yfgn, 100 UNIT/ML SOPN daily at bedtime IF BS > 160-200,gets 2 units; > 300,gets 3 units 11/16/23  Yes Historical Provider, MD   tamsulosin (FLOMAX) 0.4 mg Take 0.4 mg by mouth daily with dinner   Yes Historical Provider, MD   timolol (TIMOPTIC) 0.5 % ophthalmic solution Administer 1 drop to both eyes daily 1/7/21  Yes Anna Dahl MD   torsemide (DEMADEX) 20 mg tablet Take 60 mg by mouth daily 7/5/24  Yes Historical Provider, MD   ZINC OXIDE PO Take by mouth daily   Yes Historical Provider, MD   ALPRAZolam (XANAX) 0.5 mg tablet 0.5 mg daily at bedtime as needed for anxiety or sleep  Patient not taking: Reported on 8/19/2024 3/12/18   Historical Provider, MD   Blood Pressure Monitor NILTON by Does not apply route daily 2/19/20   Fredi Guadarrama MD   docusate sodium (COLACE) 100 mg capsule Take 1 capsule (100 mg total) by mouth 2 (two) times a day as needed for constipation  Patient not taking: Reported on 8/19/2024 8/10/23   Katrina Lobo PA-C   halobetasol  (ULTRAVATE) 0.05 % cream  12/7/23   Historical Provider, MD   insulin aspart (NovoLOG FlexPen) 100 UNIT/ML injection pen  5/20/24   Historical Provider, MD   Sure Comfort Pen Needles 32G X 4 MM MISC  5/30/24   Historical Provider, MD   glimepiride (AMARYL) 4 mg tablet Take 4 mg by mouth daily 8/9/24 12/7/24  Historical Provider, MD     Allergies   Allergen Reactions    Elemental Sulfur     Sulfa Antibiotics        Objective :  Temp:  [96.8 °F (36 °C)] 96.8 °F (36 °C)  HR:  [66-93] 70  BP: (110-162)/(56-85) 158/85  Resp:  [18-24] 18  SpO2:  [100 %] 100 %  O2 Device: None (Room air)    Physical Exam  Vitals and nursing note reviewed.   Constitutional:       Appearance: He is well-developed.   HENT:      Head: Normocephalic and atraumatic.   Neck:      Thyroid: No thyromegaly.      Vascular: No JVD.      Trachea: No tracheal deviation.   Cardiovascular:      Rate and Rhythm: Normal rate. Rhythm irregular.      Heart sounds: Normal heart sounds.   Pulmonary:      Effort: Pulmonary effort is normal. No respiratory distress.      Breath sounds: No wheezing or rales.      Comments: Diminished breath sounds bilateral lower lobes, on room air, respiration easy on exam.  Abdominal:      General: Bowel sounds are normal. There is no distension.      Palpations: Abdomen is soft.      Tenderness: There is no abdominal tenderness. There is no guarding.   Musculoskeletal:      Cervical back: Neck supple.      Right lower leg: Edema present.      Left lower leg: Edema present.      Comments: 2+ bilateral lower extremity edema,3+ bilateral pedal edema.  Open superficial wounds on both shins, appears dry.   Skin:     General: Skin is warm and dry.   Neurological:      General: No focal deficit present.      Mental Status: He is alert and oriented to person, place, and time.   Psychiatric:         Mood and Affect: Mood normal.         Judgment: Judgment normal.          Lines/Drains:            Lab Results: I have reviewed the  following results:  Results from last 7 days   Lab Units 12/07/24  1439   WBC Thousand/uL 6.13   HEMOGLOBIN g/dL 10.9*   HEMATOCRIT % 34.0*   PLATELETS Thousands/uL 100*   SEGS PCT % 77*   LYMPHO PCT % 12*   MONO PCT % 10   EOS PCT % 0     Results from last 7 days   Lab Units 12/07/24  1439   SODIUM mmol/L 131*   POTASSIUM mmol/L 4.6   CHLORIDE mmol/L 97   CO2 mmol/L 27   BUN mg/dL 63*   CREATININE mg/dL 1.97*   ANION GAP mmol/L 7   CALCIUM mg/dL 8.4   ALBUMIN g/dL 3.3*   TOTAL BILIRUBIN mg/dL 0.69   ALK PHOS U/L 141*   ALT U/L 24   AST U/L 24   GLUCOSE RANDOM mg/dL 244*             Lab Results   Component Value Date    HGBA1C 7.2 (H) 08/19/2024    HGBA1C 8.8 (H) 11/20/2023    HGBA1C 8.3 (H) 02/23/2023                 Administrative Statements       ** Please Note: This note has been constructed using a voice recognition system. **

## 2024-12-08 NOTE — ASSESSMENT & PLAN NOTE
care per nursing staff and wound nurse  will supplement with IV albumin 25%, 25 g twice a day with diuretics

## 2024-12-08 NOTE — PLAN OF CARE
Problem: PAIN - ADULT  Goal: Verbalizes/displays adequate comfort level or baseline comfort level  Description: Interventions:  - Encourage patient to monitor pain and request assistance  - Assess pain using appropriate pain scale  - Administer analgesics based on type and severity of pain and evaluate response  - Implement non-pharmacological measures as appropriate and evaluate response  - Consider cultural and social influences on pain and pain management  - Notify physician/advanced practitioner if interventions unsuccessful or patient reports new pain  Outcome: Progressing     Problem: INFECTION - ADULT  Goal: Absence or prevention of progression during hospitalization  Description: INTERVENTIONS:  - Assess and monitor for signs and symptoms of infection  - Monitor lab/diagnostic results  - Monitor all insertion sites, i.e. indwelling lines, tubes, and drains  - Monitor endotracheal if appropriate and nasal secretions for changes in amount and color  - [place] appropriate cooling/warming therapies per order  - Administer medications as ordered  - Instruct and encourage patient and family to use good hand hygiene technique  - Identify and instruct in appropriate isolation precautions for identified infection/condition  Outcome: Progressing     Problem: SAFETY ADULT  Goal: Patient will remain free of falls  Description: INTERVENTIONS:  - Educate patient/family on patient safety including physical limitations  - Instruct patient to call for assistance with activity   - Consult OT/PT to assist with strengthening/mobility   - Keep Call bell within reach  - Keep bed low and locked with side rails adjusted as appropriate  - Keep care items and personal belongings within reach  - Initiate and maintain comfort rounds  - Make Fall Risk Sign visible to staff  - Offer Toileting every 2 Hours, in advance of need  - Initiate/Maintain bed alarm  - Obtain necessary fall risk management equipment: walker   - Apply yellow  socks and bracelet for high fall risk patients  - Consider moving patient to room near nurses station  Outcome: Progressing  Goal: Maintain or return to baseline ADL function  Description: INTERVENTIONS:  -  Assess patient's ability to carry out ADLs; assess patient's baseline for ADL function and identify physical deficits which impact ability to perform ADLs (bathing, care of mouth/teeth, toileting, grooming, dressing, etc.)  - Assess/evaluate cause of self-care deficits   - Assess range of motion  - Assess patient's mobility; develop plan if impaired  - Assess patient's need for assistive devices and provide as appropriate  - Encourage maximum independence but intervene and supervise when necessary  - Involve family in performance of ADLs  - Assess for home care needs following discharge   - Consider OT consult to assist with ADL evaluation and planning for discharge  - Provide patient education as appropriate  Outcome: Progressing  Goal: Maintains/Returns to pre admission functional level  Description: INTERVENTIONS:  - Perform AM-PAC 6 Click Basic Mobility/ Daily Activity assessment daily.  - Set and communicate daily mobility goal to care team and patient/family/caregiver.   - Collaborate with rehabilitation services on mobility goals if consulted  - Perform Range of Motion 3 times a day.  - Reposition patient every 2 hours.  - Dangle patient 3 times a day  - Stand patient 3 times a day  - Ambulate patient 3 times a day  - Out of bed to chair 3 times a day   - Out of bed for meals 3 times a day  - Out of bed for toileting  - Record patient progress and toleration of activity level   Outcome: Progressing     Problem: DISCHARGE PLANNING  Goal: Discharge to home or other facility with appropriate resources  Description: INTERVENTIONS:  - Identify barriers to discharge w/patient and caregiver  - Arrange for needed discharge resources and transportation as appropriate  - Identify discharge learning needs (meds,  wound care, etc.)  - Arrange for interpretive services to assist at discharge as needed  - Refer to Case Management Department for coordinating discharge planning if the patient needs post-hospital services based on physician/advanced practitioner order or complex needs related to functional status, cognitive ability, or social support system  Outcome: Progressing     Problem: Knowledge Deficit  Goal: Patient/family/caregiver demonstrates understanding of disease process, treatment plan, medications, and discharge instructions  Description: Complete learning assessment and assess knowledge base.  Interventions:  - Provide teaching at level of understanding  - Provide teaching via preferred learning methods  Outcome: Progressing     Problem: Decreased Cardiac Output  Goal: Cardiac output adequate for individual needs  Description: INTERVENTIONS: Monitor for signs and symptoms of decreased cardiac output   - Monitor for dyspnea with exertion and at rest  - Monitor for orthopnea  - Monitor for signs of tachycardia. Place patient on telemetry monitoring.  - Assess patient for jugular vein distention  - Assess patient for lower extremity edema and poor peripheral perfusion   - Auscultate lung sound for Fine bibasilar crackles   - Monitor for cardiac arrythmias   - Administer beta blockers, antiarrhythmic, and blood pressure medications as ordered    Outcome: Progressing     Problem: Impaired Gas Exchange  Goal: Optimize oxygenation and ensure adequate ventilation  Description: INTERVENTIONS: Monitor for signs and symptoms of respiratory distress                - Elevate HOB or use high fowlers to promote lung expansion                - Administer oxygen as ordered to maintain adequate oxygenation                - Encourage use of IS to promote lung expansion and prevent PN                - Monitor ABGs to assess oxygenation status                - Monitor blood oxygen level to maintain adequate oxygenation                 - Encourage cough and deep breathing exercises to promote lung expansion                - Monitor patient's mental status for increased confusion    Outcome: Progressing     Problem: Excess Fluid Volume  Goal: Patient is able to achieve and maintain homeostasis  Description: INTERVENTIONS: Monitor for sign and symptoms of fluid overload  - Evaluate LE edema every shift  - Elevate LE to prevent dependent edema  - Apply SHIV stockings as ordered   - Monitor ankle circumference daily  - Assess for jugular vein distention  - Evaluate provider orders for the CHF diuretic algorithm. Administer diuretics as ordered  - Weigh the patient daily at 0600 and report a weight gain of five pounds or more   - Strict intake and output  - Monitor fluid intake and adhere to fluid restrictions  - Assess lung sounds every shift and as needed  - Monitor vital signs and lab values (CBC, chem, BUN, BNP)  - Measure and document urine output    Outcome: Progressing     Problem: Activity Intolerance  Goal: Patient is able to perform activities within their limitations  Description: INTERVENTIONS:                       -   Alternate periods of activity with periods of rest                 -   Patients is able to maintain normal vitals heart rhythm during activity                 -   Gradually increase activity and exercise as patient can tolerate                 -   Monitor blood pressure and heart before and after exercise                  -   Monitor blood oxygen saturation during activity and apply oxygen as needed    Outcome: Progressing     Problem: Knowledge Deficit  Goal: Patient is able to verbalize understanding of Heart Failure after education  Description: INTERVENTIONS:  - Educate the patient and family on signs and symptoms of HF  - Provide the patient with HF education and HF zone tool  - Educate on the importance of daily weight in the AM and reporting a weight gain               of 3 or more pounds to their primary care  physician  - Monitor for SOB  - Maintain and sodium and fluid restriction  - Educate the patient on the importance of medications such as: diuretics, betablockers,               antiarrhythmics and their purpose, dose, route, side effects and labs               if they are needed    Outcome: Progressing     Problem: Prexisting or High Potential for Compromised Skin Integrity  Goal: Skin integrity is maintained or improved  Description: INTERVENTIONS:  - Identify patients at risk for skin breakdown  - Assess and monitor skin integrity  - Assess and monitor nutrition and hydration status  - Monitor labs   - Assess for incontinence   - Turn and reposition patient  - Assist with mobility/ambulation  - Relieve pressure over bony prominences  - Avoid friction and shearing  - Provide appropriate hygiene as needed including keeping skin clean and dry  - Evaluate need for skin moisturizer/barrier cream  - Collaborate with interdisciplinary team   - Patient/family teaching  - Consider wound care consult   Outcome: Progressing

## 2024-12-08 NOTE — ASSESSMENT & PLAN NOTE
Chronic atrial fibrillation bradycardia.  Monitor on telemetry.  Anticoagulation: Continue apixaban

## 2024-12-08 NOTE — ASSESSMENT & PLAN NOTE
History of Beck's esophagus, large duodenal ulcer in July 2023  Not on PPI at home currently  Will order Pepcid for GI prophylaxis while inpatient

## 2024-12-08 NOTE — ASSESSMENT & PLAN NOTE
Lab Results   Component Value Date    EGFR 30 12/08/2024    EGFR 29 12/07/2024    EGFR 27 08/24/2024    CREATININE 1.95 (H) 12/08/2024    CREATININE 1.97 (H) 12/07/2024    CREATININE 2.10 (H) 08/24/2024     Chronic kidney disease stage IV  -presumed etiology is most likely diabetic nephropathy, with possible component of hypertensive nephrosclerosis and arterial sclerosis.  May even have a component of renal vascular disease  -baseline creatinine 2.5-3 mg/dL, creatinine currently below baseline with a creatinine 1.9 mg/dL  -Outpatient nephrologist: Dr. Guadarrama, last seen back in April  -Dialysis modalities in the past have been discussed at length  -Chronic Kidney Disease education/Kidney Smart:  Attended  -no urgent indication for dialysis at this time  -avoid NSAIDs  -No recent renal imaging and no clinical indication to repeat at this time  -diabetic and blood pressure control  -No urgent indication for renal placement therapy.  No firm decision on renal placement therapy at this time.  -Increase IV Lasix to 40 mg twice daily.  He on torsemide 80 mg daily

## 2024-12-08 NOTE — ASSESSMENT & PLAN NOTE
-- Sodium 131 admission likely volume mediated with elevated serum glucose level  --Sodium improved to 137  --Continue fluid restriction 1.8 L/day  --Continue IV Lasix

## 2024-12-08 NOTE — ASSESSMENT & PLAN NOTE
Likely due to CKD 4.  On iron supplement at home.  Will continue.  Based hemoglobin 10-12  Hemoglobin stable, monitor

## 2024-12-08 NOTE — ASSESSMENT & PLAN NOTE
-- Blood pressure currently acceptable  --Avoid hypotension  --Continue diuresis but increase IV Lasix

## 2024-12-08 NOTE — ASSESSMENT & PLAN NOTE
Lab Results   Component Value Date    HGBA1C 7.2 (H) 08/19/2024   -- Diabetes close to target at 7.1 A1c    Recent Labs     12/07/24  2106 12/08/24  0719 12/08/24  0749   POCGLU 215* 65 72       Blood Sugar Average: Last 72 hrs:  (P) [id number]

## 2024-12-08 NOTE — ASSESSMENT & PLAN NOTE
"Lab Results   Component Value Date    HGBA1C 7.2 (H) 08/19/2024       No results for input(s): \"POCGLU\" in the last 72 hours.    Blood Sugar Average: Last 72 hrs:  On glimepiride 2 mg p.o. daily with dinner, Humalog SSI ACHS, Semglee 2-3 units at bedtime based on blood sugar level at home per son  Will order Lantus 5 units at bedtime  SSI ACHS  Diabetic diet  Update A1c    "

## 2024-12-08 NOTE — ASSESSMENT & PLAN NOTE
Baseline creatinine closer to 2.0.  Nephrology consulted    Results from last 7 days   Lab Units 12/08/24  0434 12/07/24  1439   BUN mg/dL 65* 63*   CREATININE mg/dL 1.95* 1.97*   EGFR ml/min/1.73sq m 30 29

## 2024-12-08 NOTE — CONSULTS
Consultation - Cardiology   Name: Yao Tipton 84 y.o. male I MRN: [id number]  Unit/Bed#: 4 [place] [address]01 I Date of Admission: 12/7/2024   Date of Service: 12/8/2024 I Hospital Day: 0   Inpatient consult to Cardiology  Consult performed by: SUSANA Kaur  Consult ordered by: SUSANA Dunne        Physician Requesting Evaluation: Guzman Ibarra DO   Reason for Evaluation / Principal Problem: lower extremity edema    Assessment & Plan  Acute on chronic combined systolic and diastolic congestive heart failure (HCC)  Wt Readings from Last 3 Encounters:   12/08/24 66.6 kg (146 lb 13.2 oz)   08/19/24 67.2 kg (148 lb 3.2 oz)   08/08/24 66.7 kg (147 lb)       family notes patient has been eating salty foods since Thanksgiving  will gently diuresis with albumin 25%/25 g BID followed by Lasix 20 mg IV BID  close monitoring of his I and O, daily weights and labs  not on ACE/ARB/Arni or Aldactone secondary to stage IV kidney disease  not on SGL T2 inhibitor secondary to kidney disease  not on beta blocker secondary to bradycardia  low sodium diet  CHF education        Essential hypertension  blood pressures currently stable  not on any antihypertensives  monitor with diuresis  Diabetes mellitus with peripheral artery disease ([facility])  Lab Results   Component Value Date    HGBA1C 7.2 (H) 08/19/2024       Recent Labs     12/07/24  2106 12/08/24  0719   POCGLU 215* 65       Blood Sugar Average: Last 72 hrs:  (P) 140  managed per primary team  Chronic kidney disease, stage 4 (severe) ([facility])  Lab Results   Component Value Date    EGFR 30 12/08/2024    EGFR 29 12/07/2024    EGFR 27 08/24/2024    CREATININE 1.95 (H) 12/08/2024    CREATININE 1.97 (H) 12/07/2024    CREATININE 2.10 (H) 08/24/2024   baseline creatinine appears to be 1.8 to 2.1  continue to monitor with diuresis  nephrology consulted  Chronic atrial fibrillation (HCC)  heart rates currently controlled  not on any AV donnie blocking medication  Continue Eliquis  2.5 mg bid  Chronic anemia  baseline hemoglobin appears to be 10 to 12  monitor while in hospital  Thrombocytopenia (HCC)  chronic  Dyslipidemia  continue Lipitor 40 mg daily  Open wound of both lower extremities  care per nursing staff and wound nurse  will supplement with IV albumin 25%, 25 g twice a day with diuretics      History of Present Illness   Yao Tipton is a 84 y.o. male who presented to the emergency room yesterday after visiting nurses noted an increase in edema of his lower extremities. Family stated he also was more short of breath but on interview patient denies any shortness of breath. Per son father has been eating more salty foods since Thanksgiving. They also noted blisters on both his lower extremities which opened up and drain. Patient has been admitted for further evaluation    He has past history for chronic combined systolic and diastolic heart failure, chronic atrial fibrillation, hypertension, dyslipidemia, diabetes, gout, chronic kidney disease stage IV, BPH, Beck's esophagus, anemia with thrombocytopenia and recently had an empyema and large pericardial effusion for which he was at the [facility] in June 2023 where he had drainage of both.    Labs in the emergency room platelets were 93, high-sensitivity troponins were 14 and 15, BNP was 209, D dimer was 2.26 and BUN 65 and a creatinine 1.95. Chest x-ray was concerning for moderate pulmonary edema and CT chest noted small right pleural effusion which has increased slightly in size from 8/7/2023 after removal of his pleural X catheter.    Review of Systems   Constitutional:  Positive for activity change and fatigue.   HENT: Negative.  Negative for congestion, ear discharge, nosebleeds and sinus pressure.    Eyes: Negative.  Negative for photophobia and visual disturbance.   Respiratory:  Positive for shortness of breath. Negative for chest tightness.    Cardiovascular:  Positive for leg swelling. Negative for chest pain and  palpitations.   Gastrointestinal:  Negative for abdominal distention, blood in stool, constipation, diarrhea, nausea and vomiting.   Endocrine: Negative.  Negative for polydipsia, polyphagia and polyuria.   Genitourinary: Negative.  Negative for difficulty urinating.   Musculoskeletal:  Positive for back pain and gait problem.   Skin:  Positive for rash and wound.   Neurological:  Positive for weakness. Negative for dizziness, syncope and light-headedness.   Hematological: Negative.    Psychiatric/Behavioral: Negative.       I have reviewed the patient's PMH, PSH, Social History, Family History, Meds, and Allergies  Historical Information   Past Medical History:   Diagnosis Date    Atrial fibrillation (HCC)     BPH (benign prostatic hyperplasia)     CHF (congestive heart failure) (HCC)     Chronic kidney disease     Coronary artery disease     Diabetes mellitus (HCC)     Hyperlipidemia     Hypertension      Past Surgical History:   Procedure Laterality Date    CARDIAC CATHETERIZATION N/A 6/30/2023    Procedure: Cardiac pericardiocentesis;  Surgeon: Shanna Adame DO;  Location: [facility];  Service: Cardiology    CARDIAC CATHETERIZATION N/A 6/30/2023    Procedure: Cardiac RHC;  Surgeon: Shanna Adame DO;  Location: [facility];  Service: Cardiology    EYE SURGERY      IR CHEST TUBE PLACEMENT  6/24/2023    IR CHEST TUBE PLACEMENT  7/28/2023    IR PLEURAL EFFUSION LONG-TERM CATHETER PLACEMENT  8/7/2023    IR PLEURAL EFFUSION LONG-TERM CATHETER REMOVAL  1/3/2024    IR THORACENTESIS  12/11/2023    SKIN CANCER EXCISION      TONSILLECTOMY       Social History     Tobacco Use    Smoking status: Never    Smokeless tobacco: Former    Tobacco comments:     Smoked a pipe 50 years ago   Vaping Use    Vaping status: Never Used   Substance and Sexual Activity    Alcohol use: Not Currently     Alcohol/week: 0.0 standard drinks of alcohol     Comment: special occasions    Drug use: Not Currently    Sexual  activity: Not on file     E-Cigarette/Vaping    E-Cigarette Use Never User      E-Cigarette/Vaping Substances    Nicotine No     THC No     CBD No     Flavoring No     Other No     Unknown No      Family History   Problem Relation Age of Onset    Heart disease Mother     Diabetes Father     Vision loss Father     Cancer Sister     Diabetes Sister      Social History     Tobacco Use    Smoking status: Never    Smokeless tobacco: Former    Tobacco comments:     Smoked a pipe 50 years ago   Vaping Use    Vaping status: Never Used   Substance and Sexual Activity    Alcohol use: Not Currently     Alcohol/week: 0.0 standard drinks of alcohol     Comment: special occasions    Drug use: Not Currently    Sexual activity: Not on file       Current Facility-Administered Medications:     acetaminophen (TYLENOL) tablet 650 mg, Q6H PRN    albumin human (FLEXBUMIN) 25 % injection 25 g, BID (diuretic) **AND** furosemide (LASIX) injection 40 mg, BID (diuretic)    allopurinol (ZYLOPRIM) tablet 100 mg, BID    apixaban (ELIQUIS) tablet 2.5 mg, BID    ascorbic acid (VITAMIN C) tablet 500 mg, Daily    atorvastatin (LIPITOR) tablet 40 mg, Daily With Dinner    Cholecalciferol (VITAMIN D3) tablet 2,000 Units, Daily    famotidine (PEPCID) tablet 10 mg, HS    ferrous sulfate tablet 325 mg, Daily With Breakfast    insulin glargine (LANTUS) subcutaneous injection 5 Units 0.05 mL, HS    insulin lispro (HumALOG/ADMELOG) 100 units/mL subcutaneous injection 1-5 Units, TID AC **AND** Fingerstick Glucose (POCT), TID AC    insulin lispro (HumALOG/ADMELOG) 100 units/mL subcutaneous injection 1-5 Units, HS    latanoprost (XALATAN) 0.005 % ophthalmic solution 1 drop, HS    sodium chloride (OCEAN) 0.65 % nasal spray 1 spray, Q1H PRN    tamsulosin (FLOMAX) capsule 0.4 mg, Daily With Dinner    timolol (TIMOPTIC) 0.5 % ophthalmic solution 1 drop, Daily    zinc sulfate (ZINCATE) capsule 220 mg, Daily  Prior to Admission Medications   Prescriptions Last Dose  Informant Patient Reported? Taking?   ALPRAZolam (XANAX) 0.5 mg tablet Not Taking Child Yes No   Si.5 mg daily at bedtime as needed for anxiety or sleep   Patient not taking: Reported on 2024   Blood Pressure Monitor NILTON  Child No No   Sig: by Does not apply route daily   Semglee, yfgn, 100 UNIT/ML SOPN 2024 Child Yes Yes   Sig: daily at bedtime IF BS > 160-200,gets 2 units; > 300,gets 3 units   Sure Comfort Pen Needles 32G X 4 MM MISC   Yes No   ZINC OXIDE PO 2024 Morning Child Yes Yes   Sig: Take by mouth daily   allopurinol (ZYLOPRIM) 100 mg tablet 2024 Morning Child Yes Yes   Sig: Take 100 mg by mouth 2 (two) times a day   apixaban (Eliquis) 2.5 mg 2024 at 10:00 AM  No Yes   Sig: Take 1 tablet (2.5 mg total) by mouth 2 (two) times a day   ascorbic acid (VITAMIN C) 500 MG tablet 2024 Child No Yes   Sig: Take 1 tablet (500 mg total) by mouth daily   atorvastatin (LIPITOR) 40 mg tablet 2024 Bedtime Child No Yes   Sig: Take 1 tablet (40 mg total) by mouth daily with dinner   cholecalciferol (VITAMIN D3) 1,000 units tablet 2024 Morning Child No Yes   Sig: Take 2 tablets (2,000 Units total) by mouth daily Do not start before 2023.   docusate sodium (COLACE) 100 mg capsule Not Taking Child No No   Sig: Take 1 capsule (100 mg total) by mouth 2 (two) times a day as needed for constipation   Patient not taking: Reported on 2024   ferrous sulfate 325 (65 Fe) mg tablet 2024 Morning Child Yes Yes   Sig: Take 325 mg by mouth daily with breakfast   glimepiride (AMARYL) 2 mg tablet 2024 Evening Child No Yes   Sig: Take 1 tablet (2 mg total) by mouth daily with dinner   halobetasol (ULTRAVATE) 0.05 % cream  Child Yes No   insulin aspart (NovoLOG FlexPen) 100 UNIT/ML injection pen   Yes No   insulin lispro (HumaLOG) 100 units/mL injection 2024 Child No Yes   Sig: Inject 1-5 Units under the skin 3 (three) times a day before meals   insulin lispro (HumaLOG)  100 units/mL injection 12/6/2024 Child No Yes   Sig: Inject 1-5 Units under the skin daily at bedtime   latanoprost (XALATAN) 0.005 % ophthalmic solution 12/6/2024 Bedtime Child Yes Yes   Sig: Administer 1 drop to both eyes daily at bedtime   tamsulosin (FLOMAX) 0.4 mg 12/6/2024 Evening Child Yes Yes   Sig: Take 0.4 mg by mouth daily with dinner   timolol (TIMOPTIC) 0.5 % ophthalmic solution 12/7/2024 Morning Child No Yes   Sig: Administer 1 drop to both eyes daily   torsemide (DEMADEX) 20 mg tablet 12/7/2024 Morning  Yes Yes   Sig: Take 60 mg by mouth daily      Facility-Administered Medications: None     Elemental sulfur and Sulfa antibiotics    Objective :  Temp:  [96.8 °F (36 °C)-98.2 °F (36.8 °C)] 98.2 °F (36.8 °C)  HR:  [61-93] 61  BP: (105-162)/(56-86) 127/72  Resp:  [18-24] 18  SpO2:  [96 %-100 %] 99 %  O2 Device: Nasal cannula  Nasal Cannula O2 Flow Rate (L/min):  [1 L/min] 1 L/min  Orthostatic Blood Pressures      Flowsheet Row Most Recent Value   Blood Pressure 127/72 filed at 12/08/2024 0725   Patient Position - Orthostatic VS Lying filed at 12/07/2024 2009          First Weight: Weight - Scale: 66.7 kg (147 lb) (12/07/24 1318)  Vitals:    12/08/24 0328 12/08/24 0600   Weight: 66.6 kg (146 lb 13.2 oz) 66.6 kg (146 lb 13.2 oz)       Physical Exam  Vitals and nursing note reviewed.   Constitutional:       Appearance: He is well-developed and normal weight. He is ill-appearing (Chronically).   Cardiovascular:      Rate and Rhythm: Normal rate. Rhythm irregular.      Heart sounds: Murmur heard.   Pulmonary:      Effort: Pulmonary effort is normal.      Breath sounds: Examination of the right-middle field reveals decreased breath sounds. Examination of the right-lower field reveals decreased breath sounds. Examination of the left-lower field reveals decreased breath sounds. Decreased breath sounds present. No wheezing.      Comments: Poor inspiratory effort  Abdominal:      General: Bowel sounds are normal.       Palpations: Abdomen is soft.   Musculoskeletal:      Right lower leg: Edema present.      Left lower leg: Edema present.   Skin:     General: Skin is warm and dry.      Capillary Refill: Capillary refill takes less than 2 seconds.   Neurological:      Mental Status: He is alert.           Lab Results: I have reviewed the following results:  Results from last 7 days   Lab Units 12/08/24  0434 12/07/24  1439   WBC Thousand/uL 4.19* 6.13   HEMOGLOBIN g/dL 9.9* 10.9*   HEMATOCRIT % 30.5* 34.0*   PLATELETS Thousands/uL 93* 100*     Results from last 7 days   Lab Units 12/08/24  0434 12/07/24  1439   POTASSIUM mmol/L 4.3 4.6   CHLORIDE mmol/L 100 97   CO2 mmol/L 32 27   BUN mg/dL 65* 63*   CREATININE mg/dL 1.95* 1.97*   CALCIUM mg/dL 8.3* 8.4         Lab Results   Component Value Date    HGBA1C 7.2 (H) 08/19/2024     Lab Results   Component Value Date    TROPONINI 0.10 (H) 01/11/2019     12 lead EKG demonstrates atrial fibrillation with occasional PVC and nonspecific ST changes. Patient with history of chronic a fib    VTE Prophylaxis: VTE covered by:  apixaban, Oral, 2.5 mg at 12/07/24 0374       Bonita MEZA  Cardiology

## 2024-12-08 NOTE — ASSESSMENT & PLAN NOTE
Has required urinary catheter placement  Nursing noted a bulge and bladder scan demonstrated retention with catheterization of 600 mL  Continue tamsulosin and placed in retention protocol

## 2024-12-08 NOTE — ASSESSMENT & PLAN NOTE
heart rates currently controlled  not on any AV donnie blocking medication  Continue Eliquis 2.5 mg bid

## 2024-12-08 NOTE — ASSESSMENT & PLAN NOTE
Lab Results   Component Value Date    HGBA1C 7.2 (H) 08/19/2024       Recent Labs     12/07/24  2106 12/08/24  0719   POCGLU 215* 65       Blood Sugar Average: Last 72 hrs:  (P) 140  managed per primary team

## 2024-12-08 NOTE — PROGRESS NOTES
Progress Note - Hospitalist   Name: Yao Tipton 84 y.o. male I MRN: [id number]  Unit/Bed#: 4 [place] [address] I Date of Admission: 12/7/2024   Date of Service: 12/8/2024 I Hospital Day: 0    Assessment & Plan  Acute on chronic combined systolic and diastolic congestive heart failure (HCC)  Wt Readings from Last 3 Encounters:   12/08/24 66.6 kg (146 lb 13.2 oz)   08/19/24 67.2 kg (148 lb 3.2 oz)   08/08/24 66.7 kg (147 lb)     History of CKD 4 diabetes mellitus BPH atrial fibrillation and combined CHF who presents to the hospital for worsening leg edema  Nephrology and cardiology consulted.  There is history of dietary indiscretion including consumption of salt food  IV diuresis tailored to 40 mg furosemide twice daily  Chronic kidney disease, stage 4 (severe) (HCC)  Baseline creatinine closer to 2.0.  Nephrology consulted    Results from last 7 days   Lab Units 12/08/24  0434 12/07/24  1439   BUN mg/dL 65* 63*   CREATININE mg/dL 1.95* 1.97*   EGFR ml/min/1.73sq m 30 29     Open wound of both lower extremities  Blisters of lower extremities; wound care consulted  Diabetes mellitus with peripheral artery disease ([facility])  Lab Results   Component Value Date    HGBA1C 7.2 (H) 08/19/2024     Recent Labs     12/07/24  2106 12/08/24  0719 12/08/24  0749 12/08/24  1115   POCGLU 215* 65 72 120     Prior to admission on glimepiride.  Placed on low-dose glargine with sliding scale insulin during hospitalization  Chronic atrial fibrillation (HCC)  Chronic atrial fibrillation bradycardia.  Monitor on telemetry.  Anticoagulation: Continue apixaban  Duodenal ulcer  History of Beck's esophagitis with large duodenal ulcer  Started on famotidine this admission  BPH (benign prostatic hyperplasia)  Has required urinary catheter placement  Nursing noted a bulge and bladder scan demonstrated retention with catheterization of 600 mL  Continue tamsulosin and placed in retention protocol    VTE Pharmacologic Prophylaxis: VTE Score: 4  Moderate Risk (Score 3-4) - Pharmacological DVT Prophylaxis Ordered: apixaban (Eliquis).    Mobility:   Basic Mobility Inpatient Raw Score: 12  JH-HLM Goal: 4: Move to chair/commode  JH-HLM Achieved: 2: Bed activities/Dependent transfer  JH-HLM Goal NOT achieved. Continue with multidisciplinary rounding and encourage appropriate mobility to improve upon JH-HLM goals.    Patient Centered Rounds: I have performed bedside rounds with nursing staff today.  Discussions with Specialists or Other Care Team Provider: Cardiology    Education and Discussions with Family / Patient: Updated  (son) via phone.    Current Length of Stay: 0 day(s)  Current Patient Status: Inpatient   Certification Statement: The patient, admitted on an observation basis, will now require > 2 midnight hospital stay due to CHF requiring IV diuresis  Discharge Plan: Anticipate discharge in 48-72 hrs to discharge location to be determined pending rehab evaluations.    Code Status: Level 3 - DNAR and DNI    Subjective   Patient seen and examined.  Tired otherwise no new complaints.    Objective   Vitals:   Temp (24hrs), Av.6 °F (36.4 °C), Min:96.8 °F (36 °C), Max:98.2 °F (36.8 °C)    Temp:  [96.8 °F (36 °C)-98.2 °F (36.8 °C)] 98.2 °F (36.8 °C)  HR:  [61-93] 61  Resp:  [18-24] 18  BP: (105-162)/(56-86) 127/72  SpO2:  [96 %-100 %] 99 %  Body mass index is 22.32 kg/m².     Input and Output Summary (last 24 hours):     Intake/Output Summary (Last 24 hours) at 2024 1249  Last data filed at 2024 0827  Gross per 24 hour   Intake 240 ml   Output 320 ml   Net -80 ml       Physical Exam  Vitals reviewed.   Constitutional:       General: He is not in acute distress.  HENT:      Head: Atraumatic.   Cardiovascular:      Rate and Rhythm: Rhythm irregular.   Pulmonary:      Effort: Pulmonary effort is normal.      Breath sounds: Decreased breath sounds present. No wheezing.   Abdominal:      General: Bowel sounds are normal.       Palpations: Abdomen is soft.      Tenderness: There is no abdominal tenderness. There is no guarding.   Musculoskeletal:      Right lower leg: Edema present.      Left lower leg: Edema present.   Skin:     General: Skin is warm and dry.   Neurological:      General: No focal deficit present.      Mental Status: He is alert.      Cranial Nerves: No cranial nerve deficit.   Psychiatric:         Mood and Affect: Mood normal.       Lines/Drains:  Invasive Devices       Peripheral Intravenous Line  Duration             Peripheral IV 08/19/24 Distal;Left;Ventral (anterior) Forearm 110 days              Drain  Duration             External Urinary Catheter <1 day                      Telemetry:  Telemetry Orders (From admission, onward)               24 Hour Telemetry Monitoring  Continuous x 24 Hours (Telem)        Question:  Reason for 24 Hour Telemetry  Answer:  Decompensated CHF- and any one of the following: continuous diuretic infusion or total diuretic dose >200 mg daily, associated electrolyte derangement (I.e. K < 3.0), ionotropic drip (continuous infusion), hx of ventricular arrhythmia, or new EF < 35%                     Telemetry Reviewed: Atrial fibrillation. HR averaging    Indication for Continued Telemetry Use:                Lab Results: I have reviewed the following results:   Results from last 7 days   Lab Units 12/08/24  0434 12/07/24  1439   WBC Thousand/uL 4.19* 6.13   HEMOGLOBIN g/dL 9.9* 10.9*   PLATELETS Thousands/uL 93* 100*   MCV fL 100* 101*     Results from last 7 days   Lab Units 12/08/24  0434 12/07/24  1439   SODIUM mmol/L 137 131*   POTASSIUM mmol/L 4.3 4.6   CHLORIDE mmol/L 100 97   CO2 mmol/L 32 27   ANION GAP mmol/L 5 7   BUN mg/dL 65* 63*   CREATININE mg/dL 1.95* 1.97*   CALCIUM mg/dL 8.3* 8.4   ALBUMIN g/dL  --  3.3*   TOTAL BILIRUBIN mg/dL  --  0.69   ALK PHOS U/L  --  141*   ALT U/L  --  24   AST U/L  --  24   EGFR ml/min/1.73sq m 30 29   GLUCOSE RANDOM mg/dL 108 244*     Results  from last 7 days   Lab Units 12/08/24  0434 12/07/24  1439   MAGNESIUM mg/dL 2.2 2.2         Results from last 7 days   Lab Units 12/07/24  1636 12/07/24  1439   HS TNI 0HR ng/L  --  14   HS TNI 2HR ng/L 15  --               Results from last 7 days   Lab Units 12/08/24  1115 12/08/24  0749 12/08/24  0719 12/07/24  2106   POC GLUCOSE mg/dl 120 72 65 215*               Recent Cultures (last 7 days):         Imaging:  Reviewed radiology reports from this admission including:  XR chest 1 view portable  Result Date: 12/8/2024  Impression: Moderate pulmonary edema with moderate pleural effusions and partial right middle lobe atelectasis and right lower lobe rounded atelectasis on CT. Workstation performed: [id number]     CT chest without contrast  Result Date: 12/7/2024  Impression: Small right-sided pleural effusion is increased in size since the prior examination from 8/7/2023 with removal of previously seen drainage catheters. Surrounding pleural thickening is favored to be on the basis of chronicity versus possibly infection in the proper clinical setting. Moderate left pleural effusion is similar to the prior exam. Stable right middle and lower lobe areas of rounded atelectasis. Small pericardial effusion, improved. Workstation performed: [id number]       Last 24 Hours Medication List:     Current Facility-Administered Medications:     acetaminophen (TYLENOL) tablet 650 mg, Q6H PRN    allopurinol (ZYLOPRIM) tablet 100 mg, BID    apixaban (ELIQUIS) tablet 2.5 mg, BID    ascorbic acid (VITAMIN C) tablet 500 mg, Daily    atorvastatin (LIPITOR) tablet 40 mg, Daily With Dinner    Cholecalciferol (VITAMIN D3) tablet 2,000 Units, Daily    famotidine (PEPCID) tablet 10 mg, HS    ferrous sulfate tablet 325 mg, Daily With Breakfast    furosemide (LASIX) injection 40 mg, BID (diuretic)    insulin glargine (LANTUS) subcutaneous injection 5 Units 0.05 mL, HS    insulin lispro (HumALOG/ADMELOG) 100 units/mL subcutaneous injection  1-5 Units, TID AC **AND** Fingerstick Glucose (POCT), TID AC    insulin lispro (HumALOG/ADMELOG) 100 units/mL subcutaneous injection 1-5 Units, HS    latanoprost (XALATAN) 0.005 % ophthalmic solution 1 drop, HS    sodium chloride (OCEAN) 0.65 % nasal spray 1 spray, Q1H PRN    tamsulosin (FLOMAX) capsule 0.4 mg, Daily With Dinner    timolol (TIMOPTIC) 0.5 % ophthalmic solution 1 drop, Daily    zinc sulfate (ZINCATE) capsule 220 mg, Daily    Administrative Statements   Today, Patient Was Seen By: Guzman Ibarra, DO  I have spent a total time of 35 minutes in caring for this patient on the day of the visit/encounter including Diagnostic results, Patient and family education, Counseling / Coordination of care, Documenting in the medical record, Reviewing / ordering tests, medicine, procedures  , Obtaining or reviewing history  , and Communicating with other healthcare professionals .    **Please Note: This note may have been constructed using a voice recognition system.**

## 2024-12-08 NOTE — CONSULTS
NEPHROLOGY HOSPITAL CONSULTATION   Yao Tipton 84 y.o. male MRN: [id number]  Unit/Bed#: [address] [place] [address] Encounter: [id number]    Brief History of Admission -84-year-old male with a history of chronic kidney disease stage IV and combined congestive heart failure who was admitted for worsening lower extreme edema and open wounds.    Assessment & Plan  Acute on chronic combined systolic and diastolic congestive heart failure (HCC)  Wt Readings from Last 3 Encounters:   12/08/24 66.6 kg (146 lb 13.2 oz)   08/19/24 67.2 kg (148 lb 3.2 oz)   08/08/24 66.7 kg (147 lb)   -- proBNP 209  -- Noted weight increase from April.  -- Patient had high salt intake during Thanksgiving  -- Renal function currently stable, below baseline  -- Discontinue IV albumin  -- Increase IV Lasix to 40 mg twice daily  -- Low 2 g sodium diet  -- Daily weights  -- Fluid restriction    Essential hypertension  -- Blood pressure currently acceptable  --Avoid hypotension  --Continue diuresis but increase IV Lasix  Diabetes mellitus with peripheral artery disease ([facility])  Lab Results   Component Value Date    HGBA1C 7.2 (H) 08/19/2024   -- Diabetes close to target at 7.1 A1c    Recent Labs     12/07/24  2106 12/08/24  0719 12/08/24  0749   POCGLU 215* 65 72       Blood Sugar Average: Last 72 hrs:  (P) [id number]    Chronic kidney disease, stage 4 (severe) ([facility])  Lab Results   Component Value Date    EGFR 30 12/08/2024    EGFR 29 12/07/2024    EGFR 27 08/24/2024    CREATININE 1.95 (H) 12/08/2024    CREATININE 1.97 (H) 12/07/2024    CREATININE 2.10 (H) 08/24/2024     Chronic kidney disease stage IV  -presumed etiology is most likely diabetic nephropathy, with possible component of hypertensive nephrosclerosis and arterial sclerosis.  May even have a component of renal vascular disease  -baseline creatinine 2.5-3 mg/dL, creatinine currently below baseline with a creatinine 1.9 mg/dL  -Outpatient nephrologist: Dr. Guadarrama, last seen back in  April  -Dialysis modalities in the past have been discussed at length  -Chronic Kidney Disease education/Kidney Smart:  Attended  -no urgent indication for dialysis at this time  -avoid NSAIDs  -No recent renal imaging and no clinical indication to repeat at this time  -diabetic and blood pressure control  -No urgent indication for renal placement therapy.  No firm decision on renal placement therapy at this time.  -Increase IV Lasix to 40 mg twice daily.  He on torsemide 80 mg daily  Chronic atrial fibrillation (HCC)  -- Management as per cardiology  Chronic anemia  -- Hemoglobin stable at 9.9  Thrombocytopenia (HCC)  -- Chronically low and stable  BPH (benign prostatic hyperplasia)  -- Continue Flomax  --Urinary retention protocol  Gout  -- No acute flareup at this time  --Continue allopurinol  Hyponatremia (Resolved: 12/8/2024)  -- Sodium 131 admission likely volume mediated with elevated serum glucose level  --Sodium improved to 137  --Continue fluid restriction 1.8 L/day  --Continue IV Lasix  Open wound of both lower extremities  -- Continue diuresis    I have reviewed the nephrology recommendations including assessment and plan, with patient and cardiology, and we are in agreement with renal plan including the information outlined above.    HISTORY OF PRESENT ILLNESS:  Requesting Physician: Guzman Ibarra DO  Reason for Consult: Chronic kidney disease stage IV    Yao Tipton is a 84 y.o. male who was admitted to [facility] after presenting with worsening lower EXTR edema with open wounds. A renal consultation is requested today for assistance in the management of CKD stage IV.  Patient follows with me as an outpatient last seen back in April.  Presented with a creatinine that was below baseline currently stable at 1.9 mg/dL.  Noted weight gain since office visit.    PAST MEDICAL HISTORY:  Past Medical History:   Diagnosis Date    Atrial fibrillation (HCC)     BPH (benign prostatic  hyperplasia)     CHF (congestive heart failure) (HCC)     Chronic kidney disease     Coronary artery disease     Diabetes mellitus (HCC)     Hyperlipidemia     Hypertension        PAST SURGICAL HISTORY:  Past Surgical History:   Procedure Laterality Date    CARDIAC CATHETERIZATION N/A 6/30/2023    Procedure: Cardiac pericardiocentesis;  Surgeon: Shanna Adame DO;  Location: [facility];  Service: Cardiology    CARDIAC CATHETERIZATION N/A 6/30/2023    Procedure: Cardiac RHC;  Surgeon: Shanna Adame DO;  Location: [facility];  Service: Cardiology    EYE SURGERY      IR CHEST TUBE PLACEMENT  6/24/2023    IR CHEST TUBE PLACEMENT  7/28/2023    IR PLEURAL EFFUSION LONG-TERM CATHETER PLACEMENT  8/7/2023    IR PLEURAL EFFUSION LONG-TERM CATHETER REMOVAL  1/3/2024    IR THORACENTESIS  12/11/2023    SKIN CANCER EXCISION      TONSILLECTOMY         ALLERGIES:  Allergies   Allergen Reactions    Elemental Sulfur     Sulfa Antibiotics        SOCIAL HISTORY:  Social History     Substance and Sexual Activity   Alcohol Use Not Currently    Alcohol/week: 0.0 standard drinks of alcohol    Comment: special occasions     Social History     Substance and Sexual Activity   Drug Use Not Currently     Social History     Tobacco Use   Smoking Status Never   Smokeless Tobacco Former   Tobacco Comments    Smoked a pipe 50 years ago       FAMILY HISTORY:  Family History   Problem Relation Age of Onset    Heart disease Mother     Diabetes Father     Vision loss Father     Cancer Sister     Diabetes Sister        MEDICATIONS:    Current Facility-Administered Medications:     acetaminophen (TYLENOL) tablet 650 mg, 650 mg, Oral, Q6H PRN, Chanaikwesi Peñarik, CRNP    allopurinol (ZYLOPRIM) tablet 100 mg, 100 mg, Oral, BID, Cuiyin Yurik, CRNP, 100 mg at 12/08/24 0827    apixaban (ELIQUIS) tablet 2.5 mg, 2.5 mg, Oral, BID, Cuiyin Yurik, CRNP, 2.5 mg at 12/08/24 0827    ascorbic acid (VITAMIN C) tablet 500 mg, 500 mg, Oral, Daily,  SUSANA Dunne, 500 mg at 12/08/24 0827    atorvastatin (LIPITOR) tablet 40 mg, 40 mg, Oral, Daily With Dinner, SUSANA Dunne, 40 mg at 12/07/24 2129    Cholecalciferol (VITAMIN D3) tablet 2,000 Units, 2,000 Units, Oral, Daily, SUSANA Dunne, 2,000 Units at 12/08/24 0827    famotidine (PEPCID) tablet 10 mg, 10 mg, Oral, HS, SUSANA Dunne, 10 mg at 12/07/24 2129    ferrous sulfate tablet 325 mg, 325 mg, Oral, Daily With Breakfast, SUSANA Dunne, 325 mg at 12/08/24 0827    furosemide (LASIX) injection 40 mg, 40 mg, Intravenous, BID (diuretic), Fredi Guadarrama MD    insulin glargine (LANTUS) subcutaneous injection 5 Units 0.05 mL, 5 Units, Subcutaneous, HS, SUSANA Dunne, 5 Units at 12/07/24 2127    insulin lispro (HumALOG/ADMELOG) 100 units/mL subcutaneous injection 1-5 Units, 1-5 Units, Subcutaneous, TID AC **AND** Fingerstick Glucose (POCT), , , TID AC, SUSANA Dunne    insulin lispro (HumALOG/ADMELOG) 100 units/mL subcutaneous injection 1-5 Units, 1-5 Units, Subcutaneous, HS, SUSANA Dunne, 2 Units at 12/07/24 2128    latanoprost (XALATAN) 0.005 % ophthalmic solution 1 drop, 1 drop, Both Eyes, HS, SUSANA Dunne, 1 drop at 12/08/24 0015    sodium chloride (OCEAN) 0.65 % nasal spray 1 spray, 1 spray, Each Nare, Q1H PRN, SUSANA Dunne    tamsulosin (FLOMAX) capsule 0.4 mg, 0.4 mg, Oral, Daily With Dinner, SUSANA Dunne, 0.4 mg at 12/07/24 2129    timolol (TIMOPTIC) 0.5 % ophthalmic solution 1 drop, 1 drop, Both Eyes, Daily, SUSANA Dunne, 1 drop at 12/08/24 0831    zinc sulfate (ZINCATE) capsule 220 mg, 220 mg, Oral, Daily, SUSANA Dunne, 220 mg at 12/08/24 0827    REVIEW OF SYSTEMS:  Constitutional: Negative for fatigue, anorexia, fever, chills, diaphoresis  HENT: Negative for postnasal drip  Eyes: Negative for visual disturbance.   Respiratory: Negative for cough, shortness of breath and wheezing.   Cardiovascular: Negative for chest pain, palpitations and  "leg swelling.   Gastrointestinal: Negative for abdominal pain, constipation, diarrhea, nausea and vomiting.   Genitourinary: No dysuria, hematuria  Endocrine: Negative for polyuria.   Musculoskeletal: Negative for arthralgias, back pain and joint swelling.   Skin: Negative for rash.   Neurological: Negative for focal weakness, headaches, dizziness.  Hematological: Negative for easy bruising or bleeding.  Psychiatric/Behavioral: Negative for confusion and sleep disturbance.   All the systems were reviewed and were negative except as documented on the HPI.    PHYSICAL EXAM:  Current Weight: Weight - Scale: 66.6 kg (146 lb 13.2 oz)  First Weight: Weight - Scale: 66.7 kg (147 lb)  Vitals:    12/08/24 0326 12/08/24 0328 12/08/24 0600 12/08/24 0725   BP: 158/85   127/72   BP Location:       Pulse:    61   Resp: 18      Temp: 98.2 °F (36.8 °C)      TempSrc: Oral      SpO2:    99%   Weight:  66.6 kg (146 lb 13.2 oz) 66.6 kg (146 lb 13.2 oz)    Height: 5' 8\" (1.727 m)          Intake/Output Summary (Last 24 hours) at 12/8/2024 0917  Last data filed at 12/8/2024 0757  Gross per 24 hour   Intake --   Output 320 ml   Net -320 ml     Physical Exam  Vitals and nursing note reviewed.   Constitutional:       General: He is not in acute distress.     Appearance: He is well-developed. He is not diaphoretic.   HENT:      Head: Normocephalic and atraumatic.   Eyes:      General: No scleral icterus.     Pupils: Pupils are equal, round, and reactive to light.   Cardiovascular:      Rate and Rhythm: Normal rate and regular rhythm.      Heart sounds: Normal heart sounds. No murmur heard.     No friction rub. No gallop.   Pulmonary:      Effort: Pulmonary effort is normal. No respiratory distress.      Breath sounds: Normal breath sounds. No wheezing or rales.   Chest:      Chest wall: No tenderness.   Abdominal:      General: Bowel sounds are normal. There is no distension.      Palpations: Abdomen is soft.      Tenderness: There is no " "abdominal tenderness. There is no rebound.   Musculoskeletal:         General: Normal range of motion.      Cervical back: Normal range of motion and neck supple.      Right lower leg: Edema present.      Left lower leg: Edema present.   Skin:     Findings: No rash.   Neurological:      Mental Status: He is alert and oriented to person, place, and time.           Invasive Devices:      Lab Results:   Results from last 7 days   Lab Units 12/08/24  0434 12/07/24  1439   WBC Thousand/uL 4.19* 6.13   HEMOGLOBIN g/dL 9.9* 10.9*   HEMATOCRIT % 30.5* 34.0*   PLATELETS Thousands/uL 93* 100*   POTASSIUM mmol/L 4.3 4.6   CHLORIDE mmol/L 100 97   CO2 mmol/L 32 27   BUN mg/dL 65* 63*   CREATININE mg/dL 1.95* 1.97*   CALCIUM mg/dL 8.3* 8.4   MAGNESIUM mg/dL 2.2 2.2   ALK PHOS U/L  --  141*   ALT U/L  --  24   AST U/L  --  24         Portions of the record may have been created with voice recognition software. Occasional wrong word or \"sound a like\" substitutions may have occurred due to the inherent limitations of voice recognition software. Read the chart carefully and recognize, using context, where substitutions have occurred.If you have any questions, please contact the dictating provider.    "

## 2024-12-09 ENCOUNTER — APPOINTMENT (INPATIENT)
Dept: NON INVASIVE DIAGNOSTICS | Facility: HOSPITAL | Age: 84
DRG: 291 | End: 2024-12-09
Payer: MEDICARE

## 2024-12-09 ENCOUNTER — APPOINTMENT (INPATIENT)
Dept: RADIOLOGY | Facility: HOSPITAL | Age: 84
DRG: 291 | End: 2024-12-09
Payer: MEDICARE

## 2024-12-09 PROBLEM — R33.8 ACUTE URINARY RETENTION: Status: RESOLVED | Noted: 2023-02-25 | Resolved: 2024-12-09

## 2024-12-09 PROBLEM — D64.9 CHRONIC ANEMIA: Status: RESOLVED | Noted: 2023-02-09 | Resolved: 2024-12-09

## 2024-12-09 LAB
ALBUMIN SERPL BCG-MCNC: 3.3 G/DL (ref 3.5–5)
ALP SERPL-CCNC: 122 U/L (ref 34–104)
ALT SERPL W P-5'-P-CCNC: 22 U/L (ref 7–52)
ANION GAP SERPL CALCULATED.3IONS-SCNC: 8 MMOL/L (ref 4–13)
AST SERPL W P-5'-P-CCNC: 22 U/L (ref 13–39)
BILIRUB SERPL-MCNC: 0.74 MG/DL (ref 0.2–1)
BUN SERPL-MCNC: 60 MG/DL (ref 5–25)
CALCIUM ALBUM COR SERPL-MCNC: 8.6 MG/DL (ref 8.3–10.1)
CALCIUM SERPL-MCNC: 8 MG/DL (ref 8.4–10.2)
CHLORIDE SERPL-SCNC: 100 MMOL/L (ref 96–108)
CO2 SERPL-SCNC: 28 MMOL/L (ref 21–32)
CREAT SERPL-MCNC: 1.94 MG/DL (ref 0.6–1.3)
ERYTHROCYTE [DISTWIDTH] IN BLOOD BY AUTOMATED COUNT: 16.6 % (ref 11.6–15.1)
GFR SERPL CREATININE-BSD FRML MDRD: 30 ML/MIN/1.73SQ M
GLUCOSE SERPL-MCNC: 148 MG/DL (ref 65–140)
GLUCOSE SERPL-MCNC: 152 MG/DL (ref 65–140)
GLUCOSE SERPL-MCNC: 166 MG/DL (ref 65–140)
GLUCOSE SERPL-MCNC: 203 MG/DL (ref 65–140)
GLUCOSE SERPL-MCNC: 35 MG/DL (ref 65–140)
GLUCOSE SERPL-MCNC: 36 MG/DL (ref 65–140)
GLUCOSE SERPL-MCNC: 52 MG/DL (ref 65–140)
GLUCOSE SERPL-MCNC: 86 MG/DL (ref 65–140)
HCT VFR BLD AUTO: 30.7 % (ref 36.5–49.3)
HGB BLD-MCNC: 9.7 G/DL (ref 12–17)
MCH RBC QN AUTO: 31.4 PG (ref 26.8–34.3)
MCHC RBC AUTO-ENTMCNC: 31.6 G/DL (ref 31.4–37.4)
MCV RBC AUTO: 99 FL (ref 82–98)
PLATELET # BLD AUTO: 89 THOUSANDS/UL (ref 149–390)
PMV BLD AUTO: 9.5 FL (ref 8.9–12.7)
POTASSIUM SERPL-SCNC: 4 MMOL/L (ref 3.5–5.3)
PROT SERPL-MCNC: 5.5 G/DL (ref 6.4–8.4)
RBC # BLD AUTO: 3.09 MILLION/UL (ref 3.88–5.62)
SODIUM SERPL-SCNC: 136 MMOL/L (ref 135–147)
WBC # BLD AUTO: 5.22 THOUSAND/UL (ref 4.31–10.16)

## 2024-12-09 PROCEDURE — 85027 COMPLETE CBC AUTOMATED: CPT | Performed by: INTERNAL MEDICINE

## 2024-12-09 PROCEDURE — 93970 EXTREMITY STUDY: CPT

## 2024-12-09 PROCEDURE — 93306 TTE W/DOPPLER COMPLETE: CPT

## 2024-12-09 PROCEDURE — 93970 EXTREMITY STUDY: CPT | Performed by: SURGERY

## 2024-12-09 PROCEDURE — 99232 SBSQ HOSP IP/OBS MODERATE 35: CPT | Performed by: INTERNAL MEDICINE

## 2024-12-09 PROCEDURE — 99233 SBSQ HOSP IP/OBS HIGH 50: CPT | Performed by: INTERNAL MEDICINE

## 2024-12-09 PROCEDURE — 80053 COMPREHEN METABOLIC PANEL: CPT | Performed by: INTERNAL MEDICINE

## 2024-12-09 PROCEDURE — 82948 REAGENT STRIP/BLOOD GLUCOSE: CPT

## 2024-12-09 RX ORDER — NYSTATIN 100000 [USP'U]/G
POWDER TOPICAL 2 TIMES DAILY
Status: DISCONTINUED | OUTPATIENT
Start: 2024-12-09 | End: 2024-12-15 | Stop reason: HOSPADM

## 2024-12-09 RX ORDER — DEXTROSE MONOHYDRATE 25 G/50ML
25 INJECTION, SOLUTION INTRAVENOUS ONCE
Status: COMPLETED | OUTPATIENT
Start: 2024-12-09 | End: 2024-12-09

## 2024-12-09 RX ORDER — POLYETHYLENE GLYCOL 3350 17 G/17G
17 POWDER, FOR SOLUTION ORAL DAILY
Status: DISCONTINUED | OUTPATIENT
Start: 2024-12-10 | End: 2024-12-15 | Stop reason: HOSPADM

## 2024-12-09 RX ORDER — DOCUSATE SODIUM 100 MG/1
100 CAPSULE, LIQUID FILLED ORAL 2 TIMES DAILY
Status: DISCONTINUED | OUTPATIENT
Start: 2024-12-09 | End: 2024-12-15 | Stop reason: HOSPADM

## 2024-12-09 RX ORDER — SENNOSIDES 8.6 MG
2 TABLET ORAL
Status: DISCONTINUED | OUTPATIENT
Start: 2024-12-09 | End: 2024-12-15 | Stop reason: HOSPADM

## 2024-12-09 RX ADMIN — OXYCODONE HYDROCHLORIDE AND ACETAMINOPHEN 500 MG: 500 TABLET ORAL at 08:16

## 2024-12-09 RX ADMIN — ALLOPURINOL 100 MG: 100 TABLET ORAL at 08:16

## 2024-12-09 RX ADMIN — LATANOPROST 1 DROP: 50 SOLUTION OPHTHALMIC at 21:53

## 2024-12-09 RX ADMIN — SENNOSIDES 17.2 MG: 8.6 TABLET, FILM COATED ORAL at 21:43

## 2024-12-09 RX ADMIN — APIXABAN 2.5 MG: 2.5 TABLET, FILM COATED ORAL at 17:41

## 2024-12-09 RX ADMIN — ALLOPURINOL 100 MG: 100 TABLET ORAL at 17:41

## 2024-12-09 RX ADMIN — FUROSEMIDE 40 MG: 10 INJECTION, SOLUTION INTRAMUSCULAR; INTRAVENOUS at 17:59

## 2024-12-09 RX ADMIN — INSULIN LISPRO 1 UNITS: 100 INJECTION, SOLUTION INTRAVENOUS; SUBCUTANEOUS at 08:17

## 2024-12-09 RX ADMIN — TIMOLOL MALEATE 1 DROP: 5 SOLUTION OPHTHALMIC at 08:19

## 2024-12-09 RX ADMIN — APIXABAN 2.5 MG: 2.5 TABLET, FILM COATED ORAL at 08:16

## 2024-12-09 RX ADMIN — INSULIN LISPRO 1 UNITS: 100 INJECTION, SOLUTION INTRAVENOUS; SUBCUTANEOUS at 11:51

## 2024-12-09 RX ADMIN — FUROSEMIDE 40 MG: 10 INJECTION, SOLUTION INTRAMUSCULAR; INTRAVENOUS at 08:16

## 2024-12-09 RX ADMIN — FAMOTIDINE 10 MG: 20 TABLET, FILM COATED ORAL at 21:43

## 2024-12-09 RX ADMIN — TAMSULOSIN HYDROCHLORIDE 0.4 MG: 0.4 CAPSULE ORAL at 17:41

## 2024-12-09 RX ADMIN — ATORVASTATIN CALCIUM 40 MG: 40 TABLET, FILM COATED ORAL at 17:41

## 2024-12-09 RX ADMIN — INSULIN GLARGINE 5 UNITS: 100 INJECTION, SOLUTION SUBCUTANEOUS at 21:50

## 2024-12-09 RX ADMIN — NYSTATIN: 100000 POWDER TOPICAL at 21:55

## 2024-12-09 RX ADMIN — DEXTROSE MONOHYDRATE 25 ML: 25 INJECTION, SOLUTION INTRAVENOUS at 06:08

## 2024-12-09 RX ADMIN — Medication 2000 UNITS: at 08:16

## 2024-12-09 RX ADMIN — DOCUSATE SODIUM 100 MG: 100 CAPSULE, LIQUID FILLED ORAL at 21:47

## 2024-12-09 RX ADMIN — ZINC SULFATE 220 MG (50 MG) CAPSULE 220 MG: CAPSULE at 08:16

## 2024-12-09 RX ADMIN — FERROUS SULFATE TAB 325 MG (65 MG ELEMENTAL FE) 325 MG: 325 (65 FE) TAB at 08:16

## 2024-12-09 NOTE — ASSESSMENT & PLAN NOTE
Lab Results   Component Value Date    HGBA1C 7.7 (H) 12/07/2024     Recent Labs     12/09/24  0625 12/09/24  0736 12/09/24  1149 12/09/24  1608   POCGLU 152* 203* 166* 86     Prior to admission on glimepiride  Sliding scale and basal bolus protocol

## 2024-12-09 NOTE — ASSESSMENT & PLAN NOTE
Lab Results   Component Value Date    HGBA1C 7.7 (H) 12/07/2024       Recent Labs     12/09/24  0551 12/09/24  0606 12/09/24  0625 12/09/24  0736   POCGLU 35* 52* 152* 203*       Blood Sugar Average: Last 72 hrs:  (P) 119.1  managed per primary team

## 2024-12-09 NOTE — ASSESSMENT & PLAN NOTE
Wt Readings from Last 3 Encounters:   12/09/24 63.5 kg (139 lb 15.9 oz)   08/19/24 67.2 kg (148 lb 3.2 oz)   08/08/24 66.7 kg (147 lb)       family notes patient has been eating salty foods since Thanksgiving  patient on Lasix 40 mg IV BID ordered per nephrology  close monitoring of his I and O, daily weights and labs  not on ACE/ARB/Arni or Aldactone secondary to stage IV kidney disease  not on SGL T2 inhibitor secondary to kidney disease  not on beta blocker secondary to bradycardia  low sodium diet  CHF education  12/9/2024 lower extremity edema improved

## 2024-12-09 NOTE — ASSESSMENT & PLAN NOTE
Baseline creatinine closer to 2.0.  Nephrology consulted    Results from last 7 days   Lab Units 12/09/24  0453 12/08/24  0434 12/07/24  1439   BUN mg/dL 60* 65* 63*   CREATININE mg/dL 1.94* 1.95* 1.97*   EGFR ml/min/1.73sq m 30 30 29   Renal function appears baseline

## 2024-12-09 NOTE — PROGRESS NOTES
Progress Note - Cardiology   Name: Yao Tipton 84 y.o. male I MRN: [id number]  Unit/Bed#: 4 [facility] 422-01 I Date of Admission: 12/7/2024   Date of Service: 12/9/2024 I Hospital Day: 1    Assessment & Plan  Acute on chronic combined systolic and diastolic congestive heart failure (HCC)  Wt Readings from Last 3 Encounters:   12/09/24 63.5 kg (139 lb 15.9 oz)   08/19/24 67.2 kg (148 lb 3.2 oz)   08/08/24 66.7 kg (147 lb)       family notes patient has been eating salty foods since Thanksgiving  patient on Lasix 40 mg IV BID ordered per nephrology  close monitoring of his I and O, daily weights and labs  not on ACE/ARB/Arni or Aldactone secondary to stage IV kidney disease  not on SGL T2 inhibitor secondary to kidney disease  not on beta blocker secondary to bradycardia  low sodium diet  CHF education  12/9/2024 lower extremity edema improved        Essential hypertension  blood pressures currently stable  not on any antihypertensives  monitor with diuresis  Diabetes mellitus with peripheral artery disease ([facility])  Lab Results   Component Value Date    HGBA1C 7.7 (H) 12/07/2024       Recent Labs     12/09/24  0551 12/09/24  0606 12/09/24  0625 12/09/24  0736   POCGLU 35* 52* 152* 203*       Blood Sugar Average: Last 72 hrs:  (P) 119.1  managed per primary team  Chronic kidney disease, stage 4 (severe) ([facility])  Lab Results   Component Value Date    EGFR 30 12/09/2024    EGFR 30 12/08/2024    EGFR 29 12/07/2024    CREATININE 1.94 (H) 12/09/2024    CREATININE 1.95 (H) 12/08/2024    CREATININE 1.97 (H) 12/07/2024   baseline creatinine appears to be 1.8 to 2.1  continue to monitor with diuresis  nephrology consulted  Chronic atrial fibrillation (HCC)  heart rates currently controlled  not on any AV donnie blocking medication  Continue Eliquis 2.5 mg bid  Chronic anemia  baseline hemoglobin appears to be 10 to 12  monitor while in hospital  Thrombocytopenia (HCC)  chronic  Dyslipidemia  continue Lipitor 40 mg daily  Open  wound of both lower extremities  care per nursing staff and wound nurse  will supplement with IV albumin 25%, 25 g twice a day with diuretics    Subjective   Chief Complaint: Patient seen and examined. He is more awake today and converse it. Lower extremity edema has improved and no further leaping from wounds on legs.      Objective :  Temp:  [96.1 °F (35.6 °C)-98.4 °F (36.9 °C)] 98.4 °F (36.9 °C)  HR:  [58-74] 74  BP: (132-150)/(59-70) 140/68  Resp:  [16-20] 18  SpO2:  [97 %-98 %] 97 %  O2 Device: None (Room air)  Orthostatic Blood Pressures      Flowsheet Row Most Recent Value   Blood Pressure 140/68 filed at 12/09/2024 0754   Patient Position - Orthostatic VS Lying filed at 12/09/2024 0754          First Weight: Weight - Scale: 66.7 kg (147 lb) (12/07/24 1318)  Vitals:    12/08/24 0600 12/09/24 0600   Weight: 66.6 kg (146 lb 13.2 oz) 63.5 kg (139 lb 15.9 oz)     Physical Exam  Vitals and nursing note reviewed.   Constitutional:       Appearance: He is well-developed and normal weight. He is ill-appearing (Chronically).   Neck:      Vascular: No JVD.   Cardiovascular:      Rate and Rhythm: Normal rate. Rhythm irregular.   Pulmonary:      Effort: Pulmonary effort is normal. No accessory muscle usage or respiratory distress.      Breath sounds: Examination of the right-lower field reveals decreased breath sounds. Examination of the left-lower field reveals decreased breath sounds. Decreased breath sounds present.   Abdominal:      General: Bowel sounds are normal.      Palpations: Abdomen is soft.   Musculoskeletal:      Right lower leg: No edema.      Left lower leg: No edema.   Skin:     General: Skin is warm.      Capillary Refill: Capillary refill takes less than 2 seconds.      Comments: Wounds to both lower extremities   Neurological:      General: No focal deficit present.      Mental Status: He is alert and oriented to person, place, and time.   Psychiatric:         Mood and Affect: Mood normal.            Lab Results: I have reviewed the following results:  Results from last 7 days   Lab Units 12/09/24  0453 12/08/24  0434 12/07/24  1439   WBC Thousand/uL 5.22 4.19* 6.13   HEMOGLOBIN g/dL 9.7* 9.9* 10.9*   HEMATOCRIT % 30.7* 30.5* 34.0*   PLATELETS Thousands/uL 89* 93* 100*     Results from last 7 days   Lab Units 12/09/24  0453 12/08/24  0434 12/07/24  1439   POTASSIUM mmol/L 4.0 4.3 4.6   CHLORIDE mmol/L 100 100 97   CO2 mmol/L 28 32 27   BUN mg/dL 60* 65* 63*   CREATININE mg/dL 1.94* 1.95* 1.97*   CALCIUM mg/dL 8.0* 8.3* 8.4         Lab Results   Component Value Date    HGBA1C 7.7 (H) 12/07/2024     Lab Results   Component Value Date    TROPONINI 0.10 (H) 01/11/2019         VTE Pharmacologic Prophylaxis: VTE covered by:  apixaban, Oral, 2.5 mg at 12/09/24 0816     VTE Mechanical Prophylaxis: sequential compression device    Bonita MEZA  Cardiology

## 2024-12-09 NOTE — PHYSICAL THERAPY NOTE
PT Cancellation Note       12/09/24 1505   Note Type   Note type Cancelled Session   Cancel Reasons Patient off floor/test  (Pt getting bedside ECHO. Will follow-up as schedule allows.)   Licensure   [place] License Number  Mala Ramsay [id number]

## 2024-12-09 NOTE — ASSESSMENT & PLAN NOTE
Has required urinary catheter placement  Of urinary retention protocol x 3  Continue tamsulosin  Will need trial void as an outpatient

## 2024-12-09 NOTE — ASSESSMENT & PLAN NOTE
-Outpatient nephrologist Dr. 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  Lasix was increased to IV 40 mg twice daily on 12/8  - 12/9-creatinine stable 1.9 mg/dL.  Had adequate urine output with increased Lasix yesterday.  Patient still with pulmonary edema on exam. Na, K, bicarb appropriate. Hypoglycemia improved with management by primary team    Plan  - Continue Lasix 40 mg IV twice daily today  - BMP in a.m.  - I/os; avoid nephrotoxic agents  - Avoid hypotension  - Follow-up duplex lower extremities  - f/u ECHO

## 2024-12-09 NOTE — PROGRESS NOTES
Progress Note - Hospitalist   Name: Yao Tipton 84 y.o. male I MRN: [id number]  Unit/Bed#: [address] [address] I Date of Admission: 12/7/2024   Date of Service: 12/9/2024 I Hospital Day: 1    Assessment & Plan  Acute on chronic combined systolic and diastolic congestive heart failure (HCC)  Wt Readings from Last 3 Encounters:   12/09/24 63.5 kg (139 lb 15.9 oz)   08/19/24 67.2 kg (148 lb 3.2 oz)   08/08/24 66.7 kg (147 lb)     History of CKD 4 diabetes mellitus BPH atrial fibrillation and combined CHF who presents to the hospital for worsening leg edema  Nephrology and cardiology consulted.  There is history of dietary indiscretion including consumption of salt food  IV diuresis tailored to 40 mg furosemide twice daily  Continue IV diuresis with goal of net -1 L    Intake/Output Summary (Last 24 hours) at 12/9/2024 1813  Last data filed at 12/9/2024 1500  Gross per 24 hour   Intake 240 ml   Output 1400 ml   Net -1160 ml       Chronic kidney disease, stage 4 (severe) (HCC)  Baseline creatinine closer to 2.0.  Nephrology consulted    Results from last 7 days   Lab Units 12/09/24  0453 12/08/24  0434 12/07/24  1439   BUN mg/dL 60* 65* 63*   CREATININE mg/dL 1.94* 1.95* 1.97*   EGFR ml/min/1.73sq m 30 30 29   Renal function appears baseline  Open wound of both lower extremities  Blisters of lower extremities; wound care consulted  Continue localized wound care, does not look overly infected  Diabetes mellitus with peripheral artery disease (HCC)  Lab Results   Component Value Date    HGBA1C 7.7 (H) 12/07/2024     Recent Labs     12/09/24  0625 12/09/24  0736 12/09/24  1149 12/09/24  1608   POCGLU 152* 203* 166* 86     Prior to admission on glimepiride  Sliding scale and basal bolus protocol  Chronic atrial fibrillation (HCC)  Chronic atrial fibrillation bradycardia.  Monitor on telemetry.  Anticoagulation: Continue apixaban  Duodenal ulcer  History of Beck's esophagitis with large duodenal ulcer  Started on famotidine  this admission  BPH (benign prostatic hyperplasia)  Has required urinary catheter placement  Of urinary retention protocol x 3  Continue tamsulosin  Will need trial void as an outpatient  Essential hypertension  Patient on torsemide alone at home  Due to bradycardia he was not initiated on beta-blocker  Monitor with IV diuresis  Chronic anemia (Resolved: 12/9/2024)  Likely due to CKD 4.  On iron supplement at home.  Will continue.  Based hemoglobin 10-12  Hemoglobin stable, monitor  Thrombocytopenia (HCC)  Baseline platelet count 101 - 143 in past year  Platelet count 100 today  Monitor count  Dyslipidemia  Continue statin  Gout  Continue allopurinol              Hospital Course:     84-year-old male patient presenting with acute on chronic combined systolic and diastolic heart failure.  Patient still with significant volume overload and receiving IV diuresis    Assessment:      Principal Problem:    Acute on chronic combined systolic and diastolic congestive heart failure (HCC)  Active Problems:    Essential hypertension    Diabetes mellitus with peripheral artery disease (HCC)    Chronic kidney disease, stage 4 (severe) (HCC)    Chronic atrial fibrillation (HCC)    Thrombocytopenia (HCC)    Duodenal ulcer    Dyslipidemia    BPH (benign prostatic hyperplasia)    Gout    Open wound of both lower extremities      Plan:    Continue IV diuretics with goal of net -1 L  Appreciate cardiology and nephrology recommendation       VTE Pharmacologic Prophylaxis:   Pharmacologic: Apixaban (Eliquis)  Mechanical VTE Prophylaxis in Place: Yes    AM-PAC Basic Mobility:  Basic Mobility Inpatient Raw Score: 12    -HLM Achieved: 2: Bed activities/Dependent transfer  -HLM Goal: 4: Move to chair/commode    HLM Goal listed above. Continue with multidisciplinary rounding and encourage appropriate mobility to improve upon HLM goals.         Patient Centered Rounds: Case discussed and reviewed with nursing    Discussions with  Specialists or Other Care Team Provider: Case management, nephrology    Education and Discussions with Family / Patient: Patient's son at 6:15 PM, voicemail reached, voicemail did not identify itself.  No voicemail left    Time Spent for Care: 80 minutes.  More than 50% of total time spent on counseling and coordination of care as described above.    Current Length of Stay: 1 day(s)    Current Patient Status: Inpatient   Certification Statement: The patient will continue to require additional inpatient hospital stay due to IV diuresis    Discharge Plan / Estimated Discharge Date: Anticipate discharge in the next 24 to 48 hours    Code Status: Level 3 - DNAR and DNI      Subjective:   Seen and examined, patient requests to go home.  Denies any chest pain shortness of breath or difficulty breathing.  No nausea or vomiting    A complete and comprehensive 14 point organ system review has been performed and all other systems are negative other than stated above.    Objective:     Vitals:   Temp (24hrs), Av.5 °F (36.4 °C), Min:97 °F (36.1 °C), Max:98.4 °F (36.9 °C)    Temp:  [97 °F (36.1 °C)-98.4 °F (36.9 °C)] 98.4 °F (36.9 °C)  HR:  [59-74] 70  Resp:  [16-18] 18  BP: (122-150)/(55-70) 122/55  SpO2:  [96 %-98 %] 98 %  Body mass index is 21.29 kg/m².     Input and Output Summary (last 24 hours):       Intake/Output Summary (Last 24 hours) at 2024  Last data filed at 2024 1500  Gross per 24 hour   Intake 240 ml   Output 1400 ml   Net -1160 ml       Physical Exam:     General: ill appearing, no acute distress  HEENT: atraumatic, PERRLA, moist mucosa, normal pharynx, normal tonsils and adenoids, normal tongue, no fluid in sinuses  Neck: Trachea midline, no carotid bruit, no masses  Respiratory: normal chest wall expansion, rales bilaterally, no r/r/w, no rubs  Cardiovascular: RRR, no m/r/g, Normal S1 and S2, less S3, 8 cm of JVD  Abdomen: Soft, non-tender, non-distended, normal bowel sounds in all  quadrants, no hepatosplenomegaly, no tympany  Rectal: deferred  Musculoskeletal: Moves all, 1+ edema   integumentary: warm, dry, and pink, with no rash, purpura, or petechia  Heme/Lymph: no lymphadenopathy, no bruises  Neurological: Cranial Nerves II-XII grossly intact  Psychiatric: cooperative with normal mood, affect, and cognition      Additional Data:     Labs:    Results from last 7 days   Lab Units 12/09/24  0453 12/08/24  0434 12/07/24  1439   WBC Thousand/uL 5.22   < > 6.13   HEMOGLOBIN g/dL 9.7*   < > 10.9*   HEMATOCRIT % 30.7*   < > 34.0*   PLATELETS Thousands/uL 89*   < > 100*   SEGS PCT %  --   --  77*   LYMPHO PCT %  --   --  12*   MONO PCT %  --   --  10   EOS PCT %  --   --  0    < > = values in this interval not displayed.     Results from last 7 days   Lab Units 12/09/24  0453   POTASSIUM mmol/L 4.0   CHLORIDE mmol/L 100   CO2 mmol/L 28   BUN mg/dL 60*   CREATININE mg/dL 1.94*   CALCIUM mg/dL 8.0*   ALK PHOS U/L 122*   ALT U/L 22   AST U/L 22           * I Have Reviewed All Lab Data Listed Above.  * Additional Pertinent Lab Tests Reviewed: All Labs For Current Hospital Admission Reviewed    Imaging:    Imaging Reports Reviewed Today Include: No new imaging  Imaging Personally Reviewed by Myself Includes: No new imaging    Recent Cultures (last 7 days):           Last 24 Hours Medication List:   Current Facility-Administered Medications   Medication Dose Route Frequency Provider Last Rate    acetaminophen  650 mg Oral Q6H PRN Cuiyialan Sellers, CRNP      allopurinol  100 mg Oral BID Cuiyialan Peñariaftou, CRNP      apixaban  2.5 mg Oral BID Cuiyialan Peñarifatou, CRNP      ascorbic acid  500 mg Oral Daily Cuikwesi Sellers, SUSANA      atorvastatin  40 mg Oral Daily With Dinner Cuikwesi Sellers, SUSANA      cholecalciferol  2,000 Units Oral Daily Cuikwesi Peñarifatou, CRTITO      docusate sodium  100 mg Oral BID Kenney Jeronimo DO      famotidine  10 mg Oral HS Cuiyialan Peñarifatou, CRTITO      ferrous sulfate  325 mg Oral Daily With Breakfast Cuiyialan  SUSANA Sellers      furosemide  40 mg Intravenous BID (diuretic) Fredi Guadarrama MD      insulin glargine  5 Units Subcutaneous HS CuiSUSANA Taylor      insulin lispro  1-5 Units Subcutaneous TID AC Cuikwesi Sellers, SUSANA      insulin lispro  1-5 Units Subcutaneous HS Cuiyialan Peñarifatou, SUSANA      latanoprost  1 drop Both Eyes HS SUASNA Dunne      nystatin   Topical BID Kenney Jeronimo DO      [START ON 12/10/2024] polyethylene glycol  17 g Oral Daily Kenney Jeronimo DO      senna  2 tablet Oral HS Kenney Jeronimo DO      sodium chloride  1 spray Each Nare Q1H PRN SUSANA Dunne      tamsulosin  0.4 mg Oral Daily With Dinner SUSANA Dunne      timolol  1 drop Both Eyes Daily SUSANA Dunne      zinc sulfate  220 mg Oral Daily SUSANA Dunne         AM-PAC Basic Mobility:  Basic Mobility Inpatient Raw Score: 12    -HLM Achieved: 2: Bed activities/Dependent transfer  -HLM Goal: 4: Move to chair/commode    HLM Goal listed above. Continue with multidisciplinary rounding and encourage appropriate mobility to improve upon HLM goals.     Today, Patient Was Seen By: Kenney Jeronimo DO    ** Please Note: This note was completed in part utilizing [employer] One [employer] software dictation.  Grammatical errors, random word insertions, spelling mistakes, and incomplete sentences may be an occasional consequence of this system secondary to software limitations, ambient noise, and hardware issues.  If you have any questions or concerns about the content, text, or information contained within the body of this dictation, please contact the provider for clarification. **

## 2024-12-09 NOTE — ASSESSMENT & PLAN NOTE
Lab Results   Component Value Date    EGFR 30 12/09/2024    EGFR 30 12/08/2024    EGFR 29 12/07/2024    CREATININE 1.94 (H) 12/09/2024    CREATININE 1.95 (H) 12/08/2024    CREATININE 1.97 (H) 12/07/2024   baseline creatinine appears to be 1.8 to 2.1  continue to monitor with diuresis  nephrology consulted

## 2024-12-09 NOTE — PHYSICAL THERAPY NOTE
PT Cancellation Note       12/09/24 1018   Note Type   Note type Cancelled Session   Cancel Reasons Patient off floor/test   Additional Comments PT orders received and chart reviewed. Pt currently getting bedside ECHO. Will follow-up as schedule allows.   Licensure   [place] License Number  Mala Ramsay [id number]

## 2024-12-09 NOTE — ASSESSMENT & PLAN NOTE
Blisters of lower extremities; wound care consulted  Continue localized wound care, does not look overly infected

## 2024-12-09 NOTE — CASE MANAGEMENT
Case Management Assessment & Discharge Planning Note    Patient name Yao Tipton  Location [address] [facility]-* MRN [id number]  : 1940 Date 2024       Current Admission Date: 2024  Current Admission Diagnosis:Acute on chronic combined systolic and diastolic congestive heart failure (HCC)   Patient Active Problem List    Diagnosis Date Noted Date Diagnosed    Open wound of both lower extremities 2024     Unable to care for self 2024     Gout 2024     Left ankle pain 2023     Chronic combined systolic and diastolic CHF (congestive heart failure) ([facility]) 2023     Dyslipidemia 2023     BPH (benign prostatic hyperplasia) 2023     Cellulitis of left lower extremity 2023     Constipation 08/10/2023     Pleural effusion exudative 2023     Goals of care, counseling/discussion 2023     Duodenal ulcer 07/10/2023     Encephalopathy 2023     Recent empyema of lung with persistent locuted R hydropneumothorax 2023     Hypoglycemia 2023     Thrombocytopenia ([facility]) 2023     Diarrhea 2023     Hypothermia 2023     Septic shock ([facility]) 2023     Acute urinary retention 2023     Macrocytosis 2023     Chronic anemia 2023     Chronic kidney disease-mineral and bone disorder 2023     Advanced care planning/counseling discussion 2023     Benign hypertension with CKD (chronic kidney disease) stage IV ([facility]) 2022     Persistent proteinuria 2022     COVID-19 2022     Electrolyte abnormality 2022     Cellulitis of left upper extremity 2021     Chronic atrial fibrillation ([facility]) 2021     Vitamin D deficiency 10/18/2019     Chronic kidney disease, stage 4 (severe) ([facility]) 2019     Acute on chronic combined systolic and diastolic congestive heart failure ([facility]) 2019     Recent pericardial effusion 2019     Leg edema 01/10/2019     Diabetes  mellitus with peripheral artery disease (HCC) 01/10/2019     PVC (premature ventricular contraction) 12/19/2018     Essential hypertension 12/19/2018     Closed traumatic displaced fracture of distal end of left radius with malunion 02/09/2018       LOS (days): 1  Geometric Mean LOS (GMLOS) (days):   Days to GMLOS:     OBJECTIVE:    Risk of Unplanned Readmission Score: 25.84      Current admission status: Inpatient     Preferred Pharmacy:   [id number] - [place], [place] - [address] [address]  [address] [address]  Phone: [phone] Fax: [phone]    Primary Care Provider: Nicho Baltazar DO    Primary Insurance: MEDICARE  Secondary Insurance: BLUE CROSS    ASSESSMENT:  Active Health Care Proxies       González Tipton Health Care Agent - Son   Primary Phone: [phone] (Mobile)            Readmission Root Cause  30 Day Readmission: No    Patient Information  Admitted from:: Home  Mental Status: Alert  During Assessment patient was accompanied by: Not accompanied during assessment  Assessment information provided by:: Son  Primary Caregiver: Family  Caregiver's Name:: González Tipton (son)  Caregiver's Relationship to Patient:: Family Member  Caregiver's Telephone Number:: [phone]  Support Systems: Son, Family members  County of Residence: [place]  What city do you live in?: [place], [place]  Home entry access options. Select all that apply.: No steps to enter home  Type of Current Residence: 2 story home  Upon entering residence, is there a bedroom on the main floor (no further steps)?: Yes  Upon entering residence, is there a bathroom on the main floor (no further steps)?: Yes  Living Arrangements: Lives w/ Son, Lives w/ Extended Family (sister-in-law Gisselle)    Activities of Daily Living Prior to Admission  Functional Status: Independent  Completes ADLs independently?: Yes  Ambulates independently?: Yes  Does patient use assisted devices?: Yes  Assisted Devices (DME)  used: Walker  Does patient currently own DME?: Yes  What DME does the patient currently own?: Walker, Wheelchair  Does the patient have a history of Short-Term Rehab?: Yes (Hx with [facility])  Does patient have a history of HHC?: Yes  Does patient currently have HHC?: Yes    Current Home Health Care  Type of Current Home Care Services: Nurse visit  Home Health Agency Name:: [facility]  Current Home Health Follow-Up Provider:: PCP    Patient Information Continued  Income Source: Pension/prison  Does patient have prescription coverage?: Yes  Does patient receive dialysis treatments?: No     Means of Transportation  Means of Transport to Appts:: Family transport    DISCHARGE DETAILS:    Discharge planning discussed with:: Patient, Son González  Freedom of Choice: Yes  Comments - Freedom of Choice: Choice is to return home with [facility]  CM contacted family/caregiver?: Yes  Were Treatment Team discharge recommendations reviewed with patient/caregiver?: Yes  Did patient/caregiver verbalize understanding of patient care needs?: Yes  Were patient/caregiver advised of the risks associated with not following Treatment Team discharge recommendations?: Yes    Contacts  Patient Contacts: González Tipton (son)  Relationship to Patient:: Family  Contact Method: Phone  Phone Number: [phone]  Reason/Outcome: Continuity of Care, Emergency Contact, Discharge Planning    Requested Home Health Care         Is the patient interested in HHC at discharge?: Yes  Home Health Discipline requested:: Physical Therapy, Nursing  [facility]  HHA External Referral Reason (only applicable if external HHA name selected): Patient has established relationship with provider  Home Health Follow-Up Provider:: PCP  Home Health Services Needed:: Evaluate Functional Status and Safety, Gait/ADL Training, Heart Failure Management, Strengthening/Theraputic Exercises to Improve  Function  Homebound Criteria Met:: Requires the Assistance of Another Person for Safe Ambulation or to Leave the Home, Uses an Assist Device (i.e. cane, walker, etc)  Supporting Clincal Findings:: Limited Endurance, Fatigues Easliy in Short Distances     Other Referral/Resources/Interventions Provided:  Interventions: C  Referral Comments: [facility] referral sent via [facility] to [facility]     Treatment Team Recommendation: Home with Home Health Care  Discharge Destination Plan:: Home with Home Health Care  Transport at Discharge : Family

## 2024-12-09 NOTE — ASSESSMENT & PLAN NOTE
Wt Readings from Last 3 Encounters:   12/09/24 63.5 kg (139 lb 15.9 oz)   08/19/24 67.2 kg (148 lb 3.2 oz)   08/08/24 66.7 kg (147 lb)     History of CKD 4 diabetes mellitus BPH atrial fibrillation and combined CHF who presents to the hospital for worsening leg edema  Nephrology and cardiology consulted.  There is history of dietary indiscretion including consumption of salt food  IV diuresis tailored to 40 mg furosemide twice daily  Continue IV diuresis with goal of net -1 L    Intake/Output Summary (Last 24 hours) at 12/9/2024 1813  Last data filed at 12/9/2024 1500  Gross per 24 hour   Intake 240 ml   Output 1400 ml   Net -1160 ml

## 2024-12-09 NOTE — ASSESSMENT & PLAN NOTE
-On tamsulosin  - Continue urinary retention protocol-required straight catheterization which revealed 600 cc urine output

## 2024-12-09 NOTE — PLAN OF CARE
Problem: PAIN - ADULT  Goal: Verbalizes/displays adequate comfort level or baseline comfort level  Description: Interventions:  - Encourage patient to monitor pain and request assistance  - Assess pain using appropriate pain scale  - Administer analgesics based on type and severity of pain and evaluate response  - Implement non-pharmacological measures as appropriate and evaluate response  - Consider cultural and social influences on pain and pain management  - Notify physician/advanced practitioner if interventions unsuccessful or patient reports new pain  Outcome: Progressing     Problem: INFECTION - ADULT  Goal: Absence or prevention of progression during hospitalization  Description: INTERVENTIONS:  - Assess and monitor for signs and symptoms of infection  - Monitor lab/diagnostic results  - Monitor all insertion sites, i.e. indwelling lines, tubes, and drains  - Monitor endotracheal if appropriate and nasal secretions for changes in amount and color  - [place] appropriate cooling/warming therapies per order  - Administer medications as ordered  - Instruct and encourage patient and family to use good hand hygiene technique  - Identify and instruct in appropriate isolation precautions for identified infection/condition  Outcome: Progressing  Goal: Absence of fever/infection during neutropenic period  Description: INTERVENTIONS:  - Monitor WBC    Outcome: Progressing     Problem: SAFETY ADULT  Goal: Patient will remain free of falls  Description: INTERVENTIONS:  - Educate patient/family on patient safety including physical limitations  - Instruct patient to call for assistance with activity   - Consult OT/PT to assist with strengthening/mobility   - Keep Call bell within reach  - Keep bed low and locked with side rails adjusted as appropriate  - Keep care items and personal belongings within reach  - Initiate and maintain comfort rounds  - Make Fall Risk Sign visible to staff  - Offer Toileting every 2 Hours,  in advance of need  - Initiate/Maintain bed alarm  - Obtain necessary fall risk management equipment: yellow socks  - Apply yellow socks and bracelet for high fall risk patients  - Consider moving patient to room near nurses station  Outcome: Progressing  Goal: Maintain or return to baseline ADL function  Description: INTERVENTIONS:  -  Assess patient's ability to carry out ADLs; assess patient's baseline for ADL function and identify physical deficits which impact ability to perform ADLs (bathing, care of mouth/teeth, toileting, grooming, dressing, etc.)  - Assess/evaluate cause of self-care deficits   - Assess range of motion  - Assess patient's mobility; develop plan if impaired  - Assess patient's need for assistive devices and provide as appropriate  - Encourage maximum independence but intervene and supervise when necessary  - Involve family in performance of ADLs  - Assess for home care needs following discharge   - Consider OT consult to assist with ADL evaluation and planning for discharge  - Provide patient education as appropriate  Outcome: Progressing  Goal: Maintains/Returns to pre admission functional level  Description: INTERVENTIONS:  - Perform AM-PAC 6 Click Basic Mobility/ Daily Activity assessment daily.  - Set and communicate daily mobility goal to care team and patient/family/caregiver.   - Collaborate with rehabilitation services on mobility goals if consulted  - Perform Range of Motion 3 times a day.  - Reposition patient every 2 hours.  - Dangle patient 3 times a day  - Stand patient 3 times a day  - Ambulate patient 3 times a day  - Out of bed to chair 3 times a day   - Out of bed for meals 3 times a day  - Out of bed for toileting  - Record patient progress and toleration of activity level   Outcome: Progressing     Problem: DISCHARGE PLANNING  Goal: Discharge to home or other facility with appropriate resources  Description: INTERVENTIONS:  - Identify barriers to discharge w/patient and  caregiver  - Arrange for needed discharge resources and transportation as appropriate  - Identify discharge learning needs (meds, wound care, etc.)  - Arrange for interpretive services to assist at discharge as needed  - Refer to Case Management Department for coordinating discharge planning if the patient needs post-hospital services based on physician/advanced practitioner order or complex needs related to functional status, cognitive ability, or social support system  Outcome: Progressing     Problem: Knowledge Deficit  Goal: Patient/family/caregiver demonstrates understanding of disease process, treatment plan, medications, and discharge instructions  Description: Complete learning assessment and assess knowledge base.  Interventions:  - Provide teaching at level of understanding  - Provide teaching via preferred learning methods  Outcome: Progressing     Problem: Decreased Cardiac Output  Goal: Cardiac output adequate for individual needs  Description: INTERVENTIONS: Monitor for signs and symptoms of decreased cardiac output   - Monitor for dyspnea with exertion and at rest  - Monitor for orthopnea  - Monitor for signs of tachycardia. Place patient on telemetry monitoring.  - Assess patient for jugular vein distention  - Assess patient for lower extremity edema and poor peripheral perfusion   - Auscultate lung sound for Fine bibasilar crackles   - Monitor for cardiac arrythmias   - Administer beta blockers, antiarrhythmic, and blood pressure medications as ordered    Outcome: Progressing     Problem: Impaired Gas Exchange  Goal: Optimize oxygenation and ensure adequate ventilation  Description: INTERVENTIONS: Monitor for signs and symptoms of respiratory distress                - Elevate HOB or use high fowlers to promote lung expansion                - Administer oxygen as ordered to maintain adequate oxygenation                - Encourage use of IS to promote lung expansion and prevent PN                -  Monitor ABGs to assess oxygenation status                - Monitor blood oxygen level to maintain adequate oxygenation                - Encourage cough and deep breathing exercises to promote lung expansion                - Monitor patient's mental status for increased confusion    Outcome: Progressing     Problem: Excess Fluid Volume  Goal: Patient is able to achieve and maintain homeostasis  Description: INTERVENTIONS: Monitor for sign and symptoms of fluid overload  - Evaluate LE edema every shift  - Elevate LE to prevent dependent edema  - Apply SHIV stockings as ordered   - Monitor ankle circumference daily  - Assess for jugular vein distention  - Evaluate provider orders for the CHF diuretic algorithm. Administer diuretics as ordered  - Weigh the patient daily at 0600 and report a weight gain of five pounds or more   - Strict intake and output  - Monitor fluid intake and adhere to fluid restrictions  - Assess lung sounds every shift and as needed  - Monitor vital signs and lab values (CBC, chem, BUN, BNP)  - Measure and document urine output    Outcome: Progressing     Problem: Activity Intolerance  Goal: Patient is able to perform activities within their limitations  Description: INTERVENTIONS:                       -   Alternate periods of activity with periods of rest                 -   Patients is able to maintain normal vitals heart rhythm during activity                 -   Gradually increase activity and exercise as patient can tolerate                 -   Monitor blood pressure and heart before and after exercise                  -   Monitor blood oxygen saturation during activity and apply oxygen as needed    Outcome: Progressing     Problem: Knowledge Deficit  Goal: Patient is able to verbalize understanding of Heart Failure after education  Description: INTERVENTIONS:  - Educate the patient and family on signs and symptoms of HF  - Provide the patient with HF education and HF zone tool  - Educate on  the importance of daily weight in the AM and reporting a weight gain               of 3 or more pounds to their primary care physician  - Monitor for SOB  - Maintain and sodium and fluid restriction  - Educate the patient on the importance of medications such as: diuretics, betablockers,               antiarrhythmics and their purpose, dose, route, side effects and labs               if they are needed    Outcome: Progressing     Problem: Prexisting or High Potential for Compromised Skin Integrity  Goal: Skin integrity is maintained or improved  Description: INTERVENTIONS:  - Identify patients at risk for skin breakdown  - Assess and monitor skin integrity  - Assess and monitor nutrition and hydration status  - Monitor labs   - Assess for incontinence   - Turn and reposition patient  - Assist with mobility/ambulation  - Relieve pressure over bony prominences  - Avoid friction and shearing  - Provide appropriate hygiene as needed including keeping skin clean and dry  - Evaluate need for skin moisturizer/barrier cream  - Collaborate with interdisciplinary team   - Patient/family teaching  - Consider wound care consult   Outcome: Progressing     Problem: Nutrition/Hydration-ADULT  Goal: Nutrient/Hydration intake appropriate for improving, restoring or maintaining nutritional needs  Description: Monitor and assess patient's nutrition/hydration status for malnutrition. Collaborate with interdisciplinary team and initiate plan and interventions as ordered.  Monitor patient's weight and dietary intake as ordered or per policy. Utilize nutrition screening tool and intervene as necessary. Determine patient's food preferences and provide high-protein, high-caloric foods as appropriate.     INTERVENTIONS:  - Monitor oral intake, urinary output, labs, and treatment plans  - Assess nutrition and hydration status and recommend course of action  - Evaluate amount of meals eaten  - Assist patient with eating if necessary   - Allow  adequate time for meals  - Recommend/ encourage appropriate diets, oral nutritional supplements, and vitamin/mineral supplements  - Order, calculate, and assess calorie counts as needed  - Recommend, monitor, and adjust tube feedings and TPN/PPN based on assessed needs  - Assess need for intravenous fluids  - Provide specific nutrition/hydration education as appropriate  - Include patient/family/caregiver in decisions related to nutrition  Outcome: Progressing

## 2024-12-09 NOTE — ASSESSMENT & PLAN NOTE
-Noted weight increase from April  - CT chest small R pl effusion, mod L pl effusion, atelectasis  - To note has also high salt intake during the holidays  - Current creatinine is below baseline  - Lasix was increased 12/8  - See discussion above

## 2024-12-09 NOTE — ASSESSMENT & PLAN NOTE
Patient on torsemide alone at home  Due to bradycardia he was not initiated on beta-blocker  Monitor with IV diuresis

## 2024-12-09 NOTE — NURSING NOTE
At 0547 this morning pt blood sugar was 35,pt asymptomatic 15 g of carbohydrates gave juice and gram crackers.Dextrose 50 % IV administered as ordered by MD.Blood sugar check 15 mn later ,BS:152.Monitoring ongoing.

## 2024-12-09 NOTE — PROGRESS NOTES
Progress Note - Nephrology   Name: Yao Tipton 84 y.o. male I MRN: [id number]  Unit/Bed#: 4 [place] 422-01 I Date of Admission: 12/7/2024   Date of Service: 12/9/2024 I Hospital Day: 1    Assessment & Plan  Chronic kidney disease, stage 4 (severe) ([facility])  -Outpatient nephrologist Dr. 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  Lasix was increased to IV 40 mg twice daily on 12/8  - 12/9-creatinine stable 1.9 mg/dL.  Had adequate urine output with increased Lasix yesterday.  Patient still with pulmonary edema on exam. Na, K, bicarb appropriate. Hypoglycemia improved with management by primary team    Plan  - Continue Lasix 40 mg IV twice daily today  - BMP in a.m.  - I/os; avoid nephrotoxic agents  - Avoid hypotension  - Follow-up duplex lower extremities  - f/u ECHO    Acute on chronic combined systolic and diastolic congestive heart failure (HCC)  -Noted weight increase from April  - CT chest small R pl effusion, mod L pl effusion, atelectasis  - To note has also high salt intake during the holidays  - Current creatinine is below baseline  - Lasix was increased 12/8  - See discussion above    Essential hypertension  Avoid hypotension and monitoring closely with diuresis on board  Chronic atrial fibrillation (HCC)  -Per primary team  Chronic anemia  -Monitoring closely and hemoglobin relatively stable  Thrombocytopenia ([facility])  -Chronic thrombocytopenia.  Monitor per primary team  BPH (benign prostatic hyperplasia)  -On tamsulosin  - Continue urinary retention protocol-required straight catheterization which revealed 600 cc urine output  Open wound of both lower extremities  -- Continue diuresis-wound management per primary team    I have reviewed  the nephrology recommendations including IV lasix, with primary team attending, and we are in agreement with renal plan including the information outlined above.     Subjective   Brief History of Admission -84-year-old male with a past medical history of CKD stage IV, CHF, A-fib, hypertension, diabetes, BPH who initially presents with exertional shortness of breath.  Was advised to come to the ER by his son.  Nephrology is on board for CKD.    Patient denies complaints.  No shortness of breath.  Is watching TV currently.  Blood pressure is 140 systolic.  Afebrile.  On room air.    Objective :  Temp:  [96.1 °F (35.6 °C)-98.4 °F (36.9 °C)] 98.4 °F (36.9 °C)  HR:  [58-74] 59  BP: (132-150)/(59-70) 140/68  Resp:  [16-20] 18  SpO2:  [96 %-98 %] 96 %  O2 Device: None (Room air)    Current Weight: Weight - Scale: 63.5 kg (139 lb 15.9 oz)  First Weight: Weight - Scale: 66.7 kg (147 lb)  I/O         12/07 0701  12/08 0700 12/08 0701  12/09 0700 12/09 0701  12/10 0700    P.O.  480     Total Intake(mL/kg)  480 (7.6)     Urine (mL/kg/hr)  2020 (1.3)     Total Output  2020     Net  -1540            Unmeasured Urine Occurrence 1 x            Physical Exam  General: NAD  Skin: no rash  Eyes: anicteric sclera  ENT: moist mucous membrane  Neck: supple  Chest: Rales right base greater than left base  CVS: s1s2, no murmur, no gallop, no rub  Abdomen: soft, nontender, nl sounds  Extremities: no significant pitting edema LE b/l  : no victor  Neuro: AAOX3  Psych: normal affect    Medications:    Current Facility-Administered Medications:     acetaminophen (TYLENOL) tablet 650 mg, 650 mg, Oral, Q6H PRN, Cuiyin Yurik, CRNP    allopurinol (ZYLOPRIM) tablet 100 mg, 100 mg, Oral, BID, Cuiyin Yurik, CRNP, 100 mg at 12/09/24 0816    apixaban (ELIQUIS) tablet 2.5 mg, 2.5 mg, Oral, BID, SUSANA Dunne, 2.5 mg at 12/09/24 0816    ascorbic acid (VITAMIN C) tablet 500 mg, 500 mg, Oral, Daily, SUSANA Dunne, 500 mg at 12/09/24 0816     atorvastatin (LIPITOR) tablet 40 mg, 40 mg, Oral, Daily With Dinner, SUSANA Dunne, 40 mg at 12/08/24 1705    Cholecalciferol (VITAMIN D3) tablet 2,000 Units, 2,000 Units, Oral, Daily, SUSANA Dunne, 2,000 Units at 12/09/24 0816    famotidine (PEPCID) tablet 10 mg, 10 mg, Oral, HS, SUSANA Dunne, 10 mg at 12/08/24 2130    ferrous sulfate tablet 325 mg, 325 mg, Oral, Daily With Breakfast, SUSANA Dunne, 325 mg at 12/09/24 0816    furosemide (LASIX) injection 40 mg, 40 mg, Intravenous, BID (diuretic), Fredi Guadarrama MD, 40 mg at 12/09/24 0816    insulin glargine (LANTUS) subcutaneous injection 5 Units 0.05 mL, 5 Units, Subcutaneous, HS, SUSANA Dunne, 5 Units at 12/08/24 2129    insulin lispro (HumALOG/ADMELOG) 100 units/mL subcutaneous injection 1-5 Units, 1-5 Units, Subcutaneous, TID AC, 1 Units at 12/09/24 0817 **AND** Fingerstick Glucose (POCT), , , TID AC, SUSANA Dunne    insulin lispro (HumALOG/ADMELOG) 100 units/mL subcutaneous injection 1-5 Units, 1-5 Units, Subcutaneous, HS, SUSANA Dunne, 2 Units at 12/07/24 2128    latanoprost (XALATAN) 0.005 % ophthalmic solution 1 drop, 1 drop, Both Eyes, HS, SUSANA Dunne, 1 drop at 12/08/24 2129    sodium chloride (OCEAN) 0.65 % nasal spray 1 spray, 1 spray, Each Nare, Q1H PRN, SUSANA Dunne    tamsulosin (FLOMAX) capsule 0.4 mg, 0.4 mg, Oral, Daily With Dinner, SUSANA Dunne, 0.4 mg at 12/08/24 1705    timolol (TIMOPTIC) 0.5 % ophthalmic solution 1 drop, 1 drop, Both Eyes, Daily, SUSANA Dunne, 1 drop at 12/09/24 0819    zinc sulfate (ZINCATE) capsule 220 mg, 220 mg, Oral, Daily, SUSANA Dunne, 220 mg at 12/09/24 0816      Lab Results: I have reviewed the following results:  Results from last 7 days   Lab Units 12/09/24  0453 12/08/24  0434 12/07/24  1439   WBC Thousand/uL 5.22 4.19* 6.13   HEMOGLOBIN g/dL 9.7* 9.9* 10.9*   HEMATOCRIT % 30.7* 30.5* 34.0*   PLATELETS Thousands/uL 89* 93* 100*   POTASSIUM mmol/L  "4.0 4.3 4.6   CHLORIDE mmol/L 100 100 97   CO2 mmol/L 28 32 27   BUN mg/dL 60* 65* 63*   CREATININE mg/dL 1.94* 1.95* 1.97*   CALCIUM mg/dL 8.0* 8.3* 8.4   MAGNESIUM mg/dL  --  2.2 2.2   ALBUMIN g/dL 3.3*  --  3.3*       Administrative Statements     Portions of the record may have been created with voice recognition software. Occasional wrong word or \"sound a like\" substitutions may have occurred due to the inherent limitations of voice recognition software. Read the chart carefully and recognize, using context, where substitutions have occurred.If you have any questions, please contact the dictating provider.  "

## 2024-12-10 ENCOUNTER — APPOINTMENT (INPATIENT)
Dept: RADIOLOGY | Facility: HOSPITAL | Age: 84
DRG: 291 | End: 2024-12-10
Payer: MEDICARE

## 2024-12-10 PROBLEM — R31.0 GROSS HEMATURIA: Status: ACTIVE | Noted: 2024-12-10

## 2024-12-10 LAB
ANION GAP SERPL CALCULATED.3IONS-SCNC: 5 MMOL/L (ref 4–13)
AORTIC ROOT: 3.7 CM
APICAL FOUR CHAMBER EJECTION FRACTION: 45 %
BACTERIA UR QL AUTO: ABNORMAL /HPF
BASOPHILS # BLD AUTO: 0.01 THOUSANDS/ÂΜL (ref 0–0.1)
BASOPHILS NFR BLD AUTO: 0 % (ref 0–1)
BILIRUB UR QL STRIP: NEGATIVE
BSA FOR ECHO PROCEDURE: 1.76 M2
BUN SERPL-MCNC: 57 MG/DL (ref 5–25)
CALCIUM SERPL-MCNC: 8 MG/DL (ref 8.4–10.2)
CHLORIDE SERPL-SCNC: 99 MMOL/L (ref 96–108)
CLARITY UR: CLEAR
CO2 SERPL-SCNC: 31 MMOL/L (ref 21–32)
COLOR UR: ABNORMAL
CREAT SERPL-MCNC: 1.95 MG/DL (ref 0.6–1.3)
EOSINOPHIL # BLD AUTO: 0.01 THOUSAND/ÂΜL (ref 0–0.61)
EOSINOPHIL NFR BLD AUTO: 0 % (ref 0–6)
ERYTHROCYTE [DISTWIDTH] IN BLOOD BY AUTOMATED COUNT: 16.7 % (ref 11.6–15.1)
FRACTIONAL SHORTENING: 27 (ref 28–44)
GFR SERPL CREATININE-BSD FRML MDRD: 30 ML/MIN/1.73SQ M
GLUCOSE SERPL-MCNC: 116 MG/DL (ref 65–140)
GLUCOSE SERPL-MCNC: 129 MG/DL (ref 65–140)
GLUCOSE SERPL-MCNC: 153 MG/DL (ref 65–140)
GLUCOSE SERPL-MCNC: 165 MG/DL (ref 65–140)
GLUCOSE SERPL-MCNC: 187 MG/DL (ref 65–140)
GLUCOSE SERPL-MCNC: 30 MG/DL (ref 65–140)
GLUCOSE SERPL-MCNC: 33 MG/DL (ref 65–140)
GLUCOSE UR STRIP-MCNC: NEGATIVE MG/DL
HCT VFR BLD AUTO: 30.8 % (ref 36.5–49.3)
HGB BLD-MCNC: 9.8 G/DL (ref 12–17)
HGB UR QL STRIP.AUTO: ABNORMAL
IMM GRANULOCYTES # BLD AUTO: 0.02 THOUSAND/UL (ref 0–0.2)
IMM GRANULOCYTES NFR BLD AUTO: 0 % (ref 0–2)
INTERVENTRICULAR SEPTUM IN DIASTOLE (PARASTERNAL SHORT AXIS VIEW): 1.1 CM
INTERVENTRICULAR SEPTUM: 1.1 CM (ref 0.6–1.1)
KETONES UR STRIP-MCNC: NEGATIVE MG/DL
LAAS-AP4: 28.7 CM2
LEFT ATRIUM SIZE: 4.3 CM
LEFT INTERNAL DIMENSION IN SYSTOLE: 3.3 CM (ref 2.1–4)
LEFT VENTRICULAR INTERNAL DIMENSION IN DIASTOLE: 4.5 CM (ref 3.5–6)
LEFT VENTRICULAR POSTERIOR WALL IN END DIASTOLE: 1 CM
LEFT VENTRICULAR STROKE VOLUME: 48 ML
LEUKOCYTE ESTERASE UR QL STRIP: ABNORMAL
LVSV (TEICH): 48 ML
LYMPHOCYTES # BLD AUTO: 0.62 THOUSANDS/ÂΜL (ref 0.6–4.47)
LYMPHOCYTES NFR BLD AUTO: 9 % (ref 14–44)
MAGNESIUM SERPL-MCNC: 2.1 MG/DL (ref 1.9–2.7)
MCH RBC QN AUTO: 32.1 PG (ref 26.8–34.3)
MCHC RBC AUTO-ENTMCNC: 31.8 G/DL (ref 31.4–37.4)
MCV RBC AUTO: 101 FL (ref 82–98)
MONOCYTES # BLD AUTO: 0.38 THOUSAND/ÂΜL (ref 0.17–1.22)
MONOCYTES NFR BLD AUTO: 6 % (ref 4–12)
MV E'TISSUE VEL-LAT: 6 CM/S
MV E'TISSUE VEL-SEP: 6 CM/S
MV PEAK E VEL: 49 CM/S
NEUTROPHILS # BLD AUTO: 5.65 THOUSANDS/ÂΜL (ref 1.85–7.62)
NEUTS SEG NFR BLD AUTO: 85 % (ref 43–75)
NITRITE UR QL STRIP: NEGATIVE
NON-SQ EPI CELLS URNS QL MICRO: ABNORMAL /HPF
NRBC BLD AUTO-RTO: 0 /100 WBCS
PH UR STRIP.AUTO: 6 [PH]
PHOSPHATE SERPL-MCNC: 3.2 MG/DL (ref 2.3–4.1)
PLATELET # BLD AUTO: 85 THOUSANDS/UL (ref 149–390)
PMV BLD AUTO: 9.6 FL (ref 8.9–12.7)
POTASSIUM SERPL-SCNC: 4 MMOL/L (ref 3.5–5.3)
PROT UR STRIP-MCNC: ABNORMAL MG/DL
RBC # BLD AUTO: 3.05 MILLION/UL (ref 3.88–5.62)
RBC #/AREA URNS AUTO: ABNORMAL /HPF
RIGHT ATRIUM AREA SYSTOLE A4C: 17.8 CM2
RIGHT VENTRICLE ID DIMENSION: 3.1 CM
SL CV LV EF: 45
SL CV PED ECHO LEFT VENTRICLE DIASTOLIC VOLUME (MOD BIPLANE) 2D: 93 ML
SL CV PED ECHO LEFT VENTRICLE SYSTOLIC VOLUME (MOD BIPLANE) 2D: 45 ML
SODIUM SERPL-SCNC: 135 MMOL/L (ref 135–147)
SP GR UR STRIP.AUTO: 1.02 (ref 1–1.03)
TRICUSPID ANNULAR PLANE SYSTOLIC EXCURSION: 1.3 CM
UROBILINOGEN UR STRIP-ACNC: <2 MG/DL
WBC # BLD AUTO: 6.69 THOUSAND/UL (ref 4.31–10.16)
WBC #/AREA URNS AUTO: ABNORMAL /HPF

## 2024-12-10 PROCEDURE — 99232 SBSQ HOSP IP/OBS MODERATE 35: CPT | Performed by: INTERNAL MEDICINE

## 2024-12-10 PROCEDURE — 84100 ASSAY OF PHOSPHORUS: CPT | Performed by: INTERNAL MEDICINE

## 2024-12-10 PROCEDURE — 80048 BASIC METABOLIC PNL TOTAL CA: CPT | Performed by: INTERNAL MEDICINE

## 2024-12-10 PROCEDURE — 87077 CULTURE AEROBIC IDENTIFY: CPT | Performed by: NURSE PRACTITIONER

## 2024-12-10 PROCEDURE — 97110 THERAPEUTIC EXERCISES: CPT

## 2024-12-10 PROCEDURE — 93925 LOWER EXTREMITY STUDY: CPT | Performed by: SURGERY

## 2024-12-10 PROCEDURE — 85025 COMPLETE CBC W/AUTO DIFF WBC: CPT

## 2024-12-10 PROCEDURE — 71045 X-RAY EXAM CHEST 1 VIEW: CPT

## 2024-12-10 PROCEDURE — 93306 TTE W/DOPPLER COMPLETE: CPT | Performed by: INTERNAL MEDICINE

## 2024-12-10 PROCEDURE — 93925 LOWER EXTREMITY STUDY: CPT

## 2024-12-10 PROCEDURE — 81001 URINALYSIS AUTO W/SCOPE: CPT | Performed by: NURSE PRACTITIONER

## 2024-12-10 PROCEDURE — 83735 ASSAY OF MAGNESIUM: CPT | Performed by: INTERNAL MEDICINE

## 2024-12-10 PROCEDURE — 93922 UPR/L XTREMITY ART 2 LEVELS: CPT | Performed by: SURGERY

## 2024-12-10 PROCEDURE — 87186 SC STD MICRODIL/AGAR DIL: CPT | Performed by: NURSE PRACTITIONER

## 2024-12-10 PROCEDURE — 93923 UPR/LXTR ART STDY 3+ LVLS: CPT

## 2024-12-10 PROCEDURE — 87086 URINE CULTURE/COLONY COUNT: CPT | Performed by: NURSE PRACTITIONER

## 2024-12-10 PROCEDURE — 82948 REAGENT STRIP/BLOOD GLUCOSE: CPT

## 2024-12-10 PROCEDURE — 97163 PT EVAL HIGH COMPLEX 45 MIN: CPT

## 2024-12-10 PROCEDURE — 99232 SBSQ HOSP IP/OBS MODERATE 35: CPT

## 2024-12-10 RX ORDER — DEXTROSE MONOHYDRATE 25 G/50ML
25 INJECTION, SOLUTION INTRAVENOUS ONCE
Status: COMPLETED | OUTPATIENT
Start: 2024-12-10 | End: 2024-12-10

## 2024-12-10 RX ADMIN — APIXABAN 2.5 MG: 2.5 TABLET, FILM COATED ORAL at 08:44

## 2024-12-10 RX ADMIN — NYSTATIN: 100000 POWDER TOPICAL at 09:04

## 2024-12-10 RX ADMIN — FERROUS SULFATE TAB 325 MG (65 MG ELEMENTAL FE) 325 MG: 325 (65 FE) TAB at 08:54

## 2024-12-10 RX ADMIN — INSULIN LISPRO 1 UNITS: 100 INJECTION, SOLUTION INTRAVENOUS; SUBCUTANEOUS at 12:16

## 2024-12-10 RX ADMIN — INSULIN LISPRO 1 UNITS: 100 INJECTION, SOLUTION INTRAVENOUS; SUBCUTANEOUS at 16:22

## 2024-12-10 RX ADMIN — ZINC SULFATE 220 MG (50 MG) CAPSULE 220 MG: CAPSULE at 08:44

## 2024-12-10 RX ADMIN — FAMOTIDINE 10 MG: 20 TABLET, FILM COATED ORAL at 21:40

## 2024-12-10 RX ADMIN — TAMSULOSIN HYDROCHLORIDE 0.4 MG: 0.4 CAPSULE ORAL at 16:17

## 2024-12-10 RX ADMIN — POLYETHYLENE GLYCOL 3350 17 G: 17 POWDER, FOR SOLUTION ORAL at 08:43

## 2024-12-10 RX ADMIN — Medication 2000 UNITS: at 08:44

## 2024-12-10 RX ADMIN — TIMOLOL MALEATE 1 DROP: 5 SOLUTION OPHTHALMIC at 09:04

## 2024-12-10 RX ADMIN — DOCUSATE SODIUM 100 MG: 100 CAPSULE, LIQUID FILLED ORAL at 08:44

## 2024-12-10 RX ADMIN — INSULIN LISPRO 1 UNITS: 100 INJECTION, SOLUTION INTRAVENOUS; SUBCUTANEOUS at 21:43

## 2024-12-10 RX ADMIN — OXYCODONE HYDROCHLORIDE AND ACETAMINOPHEN 500 MG: 500 TABLET ORAL at 08:44

## 2024-12-10 RX ADMIN — SENNOSIDES 17.2 MG: 8.6 TABLET, FILM COATED ORAL at 21:41

## 2024-12-10 RX ADMIN — DEXTROSE MONOHYDRATE 25 ML: 25 INJECTION, SOLUTION INTRAVENOUS at 06:21

## 2024-12-10 RX ADMIN — NYSTATIN: 100000 POWDER TOPICAL at 17:27

## 2024-12-10 RX ADMIN — DOCUSATE SODIUM 100 MG: 100 CAPSULE, LIQUID FILLED ORAL at 17:23

## 2024-12-10 RX ADMIN — LATANOPROST 1 DROP: 50 SOLUTION OPHTHALMIC at 21:42

## 2024-12-10 RX ADMIN — ALLOPURINOL 100 MG: 100 TABLET ORAL at 17:23

## 2024-12-10 RX ADMIN — FUROSEMIDE 40 MG: 10 INJECTION, SOLUTION INTRAMUSCULAR; INTRAVENOUS at 16:17

## 2024-12-10 RX ADMIN — ALLOPURINOL 100 MG: 100 TABLET ORAL at 08:44

## 2024-12-10 RX ADMIN — FUROSEMIDE 40 MG: 10 INJECTION, SOLUTION INTRAMUSCULAR; INTRAVENOUS at 08:43

## 2024-12-10 RX ADMIN — ATORVASTATIN CALCIUM 40 MG: 40 TABLET, FILM COATED ORAL at 16:17

## 2024-12-10 NOTE — PHYSICAL THERAPY NOTE
"       PHYSICAL THERAPY EVALUATION/TREATMENT     12/10/24 1500   PT Last Visit   PT Visit Date 12/10/24   Note Type   Note type Evaluation   Pain Assessment   Pain Assessment Tool 0-10   Pain Score No Pain   Restrictions/Precautions   Other Precautions Chair Alarm;Bed Alarm;Fall Risk   Home Living   Type of Home House   Home Layout One level;Stairs to enter with rails  (2 stairs to enter)   Bathroom Equipment Shower chair   Home Equipment Walker;Wheelchair-manual   Additional Comments Patient reports using a walker prior to admission   Prior Function   Level of [place] Needs assistance with ADLs;Needs assistance with IADLS;Independent with functional mobility   Lives With Son   Receives Help From Family;Home health   IADLs Family/Friend/Other provides transportation;Family/Friend/Other provides meals;Family/Friend/Other provides medication management   Comments Patient states having his sons assist at times for dressing and bathing as well as wound care also having a visiting nurse assist   General   Additional Pertinent History Chart reviewed, patient admitted with acute on chronic congestive heart failure.  Patient now presents as generally weak and deconditioned although tolerating household distance ambulation well with a roller walker.  Patient has assist in his home prior to admission   Family/Caregiver Present No   Cognition   Overall Cognitive Status WFL   Arousal/Participation Cooperative   Orientation Level Oriented to person;Oriented to place   Following Commands Follows one step commands with increased time or repetition   Comments Hard of hearing   Subjective   Subjective \" I just want to go home I miss my family\"   RLE Assessment   RLE Assessment   (Range of motion within functional limits, strength 3+/5)   LLE Assessment   LLE Assessment   (Range of motion within functional limits, strength 3+/5)   Coordination   Movements are Fluid and Coordinated 0   Coordination and Movement Description " Decreased coordination with transferring gait activity   Bed Mobility   Additional Comments Patient sitting out of bed in bedside chair upon arrival by therapist   Transfers   Sit to Stand 4  Minimal assistance   Additional items Assist x 1   Stand to Sit 5  Supervision   Additional items Verbal cues   Toilet transfer 4  Minimal assistance   Additional items Assist x 1  (Min assist to sit down although patient able to stand independently with use of wall bar)   Ambulation/Elevation   Gait Assistance 4  Minimal assist   Additional items Assist x 1;Verbal cues;Tactile cues   Assistive Device Rolling walker   Distance 20 feet with forward flexed posturing and cueing to lift head at times.  Shortened stride length and slow gait speed   Balance   Static Sitting Fair   Dynamic Sitting Fair   Static Standing Fair -   Dynamic Standing Fair -   Ambulatory Fair -   Activity Tolerance   Activity Tolerance Patient limited by fatigue;Patient tolerated treatment well   Nurse Made Aware Yes   Assessment   Prognosis Good   Problem List Decreased strength;Decreased range of motion;Decreased endurance;Impaired balance;Decreased mobility;Decreased coordination   Assessment Patient seen for Physical Therapy evaluation. Patient admitted with Acute on chronic combined systolic and diastolic congestive heart failure (HCC).  Comorbidities affecting patient's physical performance include: .  Personal factors affecting patient at time of initial evaluation include: ambulating with assistive device, inability to ambulate household distances, inability to navigate community distances, inability to navigate level surfaces without external assistance, inability to perform dynamic tasks in community, limited home support, inability to perform physical activity, inability to perform ADLS, and inability to perform IADLS . Prior to admission, patient was independent with functional mobility with walker, requiring assist for ADLS, requiring assist  for IADLS, ambulating household distance, and home with family assist.  Please find objective findings from Physical Therapy assessment regarding body systems outlined above with impairments and limitations including weakness, decreased ROM, impaired balance, decreased endurance, impaired coordination, gait deviations, decreased activity tolerance, decreased functional mobility tolerance, fall risk, and SOB upon exertion.  The Barthel Index was used as a functional outcome tool presenting with a score of Barthel Index Score: 55 today indicating marked limitations of functional mobility and ADLS.  Patient's clinical presentation is currently unstable/unpredictable as seen in patient's presentation of vital sign response, changing level of pain, increased fall risk, new onset of impairment of functional mobility, decreased endurance, and new onset of weakness. Pt would benefit from continued Physical Therapy treatment to address deficits as defined above and maximize level of functional mobility. As demonstrated by objective findings, the assigned level of complexity for this evaluation is high.The patient's -[facility] Basic Mobility Inpatient Short Form Raw Score is 17. A Raw score of greater than 16 suggests the patient may benefit from discharge to home. Please also refer to the recommendation of the Physical Therapist for safe discharge planning.   Goals   Patient Goals To go home very soon   STG Expiration Date 12/17/24   Short Term Goal #1 Transfers and gait with roller walker with supervision   Short Term Goal #2 Gait endurance to 80 feet   LTG Expiration Date 12/24/24   Long Term Goal #1 Strength bilateral lower extremities 4 -/5   Long Term Goal #2 Independent transfers and gait with roller walker for functional household distances as prior to admission   Plan   Treatment/Interventions ADL retraining;Functional transfer training;LE strengthening/ROM;Therapeutic exercise;Endurance training;Patient/family  training;Equipment eval/education;Bed mobility;Gait training;Compensatory technique education   PT Frequency 3-5x/wk   Discharge Recommendation   Rehab Resource Intensity Level, PT III (Minimum Resource Intensity)   AM-PAC Basic Mobility Inpatient   Turning in Flat Bed Without Bedrails 3   Lying on Back to Sitting on Edge of Flat Bed Without Bedrails 3   Moving Bed to Chair 3   Standing Up From Chair Using Arms 3   Walk in Room 3   Climb 3-5 Stairs With Railing 2   Basic Mobility Inpatient Raw Score 17   Basic Mobility Standardized Score 39.67   [facility] Highest Level Of Mobility   -HLM Goal 5: Stand one or more mins   -HLM Achieved 7: Walk 25 feet or more   Barthel Index   Feeding 10   Bathing 0   Grooming Score 0   Dressing Score 5   Bladder Score 10   Bowels Score 10   Toilet Use Score 5   Transfers (Bed/Chair) Score 10   Mobility (Level Surface) Score 0   Stairs Score 5   Barthel Index Score 55   Additional Treatment Session   Start Time 1445   End Time 1500   Treatment Assessment s: Patient states no pain O: Bilateral lower extremity exercise completed as listed below.  Gait training with roller walker for 40 feet with change in direction, min assist to supervision required.  Patient also required moderate assist for toileting hygiene following a BM A: Patient will benefit from continued physical therapy with progression as tolerated and increasing functional mobility with clinical staff as well   Exercises   Hip Flexion Sitting;10 reps;Bilateral  (Alternating)   Hip Abduction Sitting;10 reps;Bilateral  (Alternating)   Knee AROM Long Arc Quad Sitting;10 reps;Bilateral  (Alternating)   Ankle Pumps Sitting;10 reps;Bilateral   Balance training  Sidestepping and backward stepping completed for balance and coordination   Licensure   [place] License Number  Celine Meier, PT 4 0 [id number]

## 2024-12-10 NOTE — PROGRESS NOTES
Progress Note - Nephrology   Name: Yao Tipton 84 y.o. male I MRN: [id number]  Unit/Bed#: 4 [place] 422-01 I Date of Admission: 12/7/2024   Date of Service: 12/10/2024 I Hospital Day: 2    Assessment & Plan  Chronic kidney disease, stage 4 (severe) ([facility])  -Outpatient nephrologist Dr. 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  Lasix was increased to IV 40 mg twice daily on 12/8  - 12/9-creatinine stable 1.9 mg/dL.  Had adequate urine output with increased Lasix yesterday.  Patient still with pulmonary edema on exam. Na, K, bicarb appropriate. Hypoglycemia improved with management by primary team  - 12/10-creatinine stable 1.9.  Sodium, potassium, bicarbonate, phosphorus and magnesium stable and appropriate.    Plan  - Continue Lasix 40 mg IV twice daily today and likely transition to oral diuretic tomorrow.  Still with some signs of volume overload with slight pulmonary edema on exam.  Agree with checking x-ray to confirm  - BMP in a.m.  - I/os; avoid nephrotoxic agents  - Avoid hypotension  - Follow-up duplex lower extremities    Acute on chronic combined systolic and diastolic congestive heart failure (HCC)  -Noted weight increase from April  - CT chest small R pl effusion, mod L pl effusion, atelectasis  - To note has also high salt intake during the holidays  - Current creatinine is below baseline  - Lasix was increased 12/8 and patient is tolerating IV Lasix well.  - See discussion above    Essential hypertension  Avoid hypotension and monitoring closely with diuresis on board  Chronic atrial fibrillation (HCC)  -Per primary team  Thrombocytopenia (HCC)  -Chronic thrombocytopenia.  Monitor per primary team  BPH (benign prostatic  hyperplasia)  -On tamsulosin  - Continue urinary retention protocol-required straight catheterization which revealed 600 cc urine output  Open wound of both lower extremities  -- Continue diuresis-wound management per primary team  Acute urinary retention  - Urology on board.  - Had hematuria.  Being evaluated by urology team.  May be traumatic Dupont insertion    I have reviewed the nephrology recommendations including continue IV Lasix today, with primary team attending, and we are in agreement with renal plan including the information outlined above.     Subjective   Brief History of Admission - 84-year-old male with a past medical history of CKD stage IV, CHF, A-fib, hypertension, diabetes, BPH who initially presents with exertional shortness of breath. Was advised to come to the ER by his son. Nephrology is on board for CKD.     Patient denies complaints.  No shortness of breath.  Blood pressure is 120 systolic.  Urine output 800 cc?    Objective :  Temp:  [97.5 °F (36.4 °C)] 97.5 °F (36.4 °C)  HR:  [57-76] 76  BP: (121-139)/(55-67) 121/58  Resp:  [18-19] 18  SpO2:  [95 %-99 %] 95 %  O2 Device: None (Room air)    Current Weight: Weight - Scale: 64 kg (141 lb)  First Weight: Weight - Scale: 66.7 kg (147 lb)  I/O         12/08 0701  12/09 0700 12/09 0701  12/10 0700 12/10 0701  12/11 0700    P.O. 480 240     Total Intake(mL/kg) 480 (7.6) 240 (3.8)     Urine (mL/kg/hr) 2020 (1.3) 800 (0.5)     Stool  0     Total Output 2020 800     Net -1540 -560            Unmeasured Stool Occurrence  1 x           Physical Exam  General: NAD  Skin: no rash  Eyes: anicteric sclera  ENT: moist mucous membrane  Neck: supple  Chest: Diminished intake bases with fine lateral rales  CVS: s1s2, no murmur, no gallop, no rub  Abdomen: soft, nontender, nl sounds  Extremities: no edema LE b/l  : Dupont with punch colored urine  Neuro: AAOX3  Psych: normal affect  '  Medications:    Current Facility-Administered Medications:      acetaminophen (TYLENOL) tablet 650 mg, 650 mg, Oral, Q6H PRN, SUSANA Dunne    allopurinol (ZYLOPRIM) tablet 100 mg, 100 mg, Oral, BID, CARROL DunneNP, 100 mg at 12/10/24 0844    [Held by provider] apixaban (ELIQUIS) tablet 2.5 mg, 2.5 mg, Oral, BID, CARROL DunneNP, 2.5 mg at 12/10/24 0844    ascorbic acid (VITAMIN C) tablet 500 mg, 500 mg, Oral, Daily, SUSANA Dunne, 500 mg at 12/10/24 0844    atorvastatin (LIPITOR) tablet 40 mg, 40 mg, Oral, Daily With Dinner, SUSANA Dunne, 40 mg at 12/09/24 1741    Cholecalciferol (VITAMIN D3) tablet 2,000 Units, 2,000 Units, Oral, Daily, SUSANA Dunne, 2,000 Units at 12/10/24 0844    docusate sodium (COLACE) capsule 100 mg, 100 mg, Oral, BID, Kenney Jeronimo DO, 100 mg at 12/10/24 0844    famotidine (PEPCID) tablet 10 mg, 10 mg, Oral, HS, SUSANA Dunne, 10 mg at 12/09/24 2143    ferrous sulfate tablet 325 mg, 325 mg, Oral, Daily With Breakfast, SUSANA Dunne, 325 mg at 12/10/24 0854    furosemide (LASIX) injection 40 mg, 40 mg, Intravenous, BID (diuretic), Fredi Guadarrama MD, 40 mg at 12/10/24 0843    insulin lispro (HumALOG/ADMELOG) 100 units/mL subcutaneous injection 1-5 Units, 1-5 Units, Subcutaneous, TID AC, 1 Units at 12/09/24 1151 **AND** Fingerstick Glucose (POCT), , , TID AC, SUSANA Dunne    insulin lispro (HumALOG/ADMELOG) 100 units/mL subcutaneous injection 1-5 Units, 1-5 Units, Subcutaneous, HS, SUSANA Dunne, 2 Units at 12/07/24 2128    latanoprost (XALATAN) 0.005 % ophthalmic solution 1 drop, 1 drop, Both Eyes, HS, SUSANA Dunne, 1 drop at 12/09/24 2153    nystatin (MYCOSTATIN) powder, , Topical, BID, Kenney Jeronimo DO, Given at 12/10/24 0904    polyethylene glycol (MIRALAX) packet 17 g, 17 g, Oral, Daily, Kenney Jeronimo DO, 17 g at 12/10/24 0843    senna (SENOKOT) tablet 17.2 mg, 2 tablet, Oral, HS, Kenney Jeronimo DO, 17.2 mg at 12/09/24 2143    sodium chloride (OCEAN) 0.65 % nasal spray 1 spray, 1  "spray, Each Nare, Q1H PRN, SUSANA Dunne    tamsulosin (FLOMAX) capsule 0.4 mg, 0.4 mg, Oral, Daily With Dinner, SUSANA Dunne, 0.4 mg at 12/09/24 1741    timolol (TIMOPTIC) 0.5 % ophthalmic solution 1 drop, 1 drop, Both Eyes, Daily, SUSANA Dunne, 1 drop at 12/10/24 0904    zinc sulfate (ZINCATE) capsule 220 mg, 220 mg, Oral, Daily, Sushantalan Sellers, CARROLNP, 220 mg at 12/10/24 0844      Lab Results: I have reviewed the following results:  Results from last 7 days   Lab Units 12/10/24  1050 12/10/24  0513 12/09/24  0453 12/08/24  0434 12/07/24  1439   WBC Thousand/uL 6.69  --  5.22 4.19* 6.13   HEMOGLOBIN g/dL 9.8*  --  9.7* 9.9* 10.9*   HEMATOCRIT % 30.8*  --  30.7* 30.5* 34.0*   PLATELETS Thousands/uL 85*  --  89* 93* 100*   POTASSIUM mmol/L  --  4.0 4.0 4.3 4.6   CHLORIDE mmol/L  --  99 100 100 97   CO2 mmol/L  --  31 28 32 27   BUN mg/dL  --  57* 60* 65* 63*   CREATININE mg/dL  --  1.95* 1.94* 1.95* 1.97*   CALCIUM mg/dL  --  8.0* 8.0* 8.3* 8.4   MAGNESIUM mg/dL  --  2.1  --  2.2 2.2   PHOSPHORUS mg/dL  --  3.2  --   --   --    ALBUMIN g/dL  --   --  3.3*  --  3.3*       Administrative Statements     Portions of the record may have been created with voice recognition software. Occasional wrong word or \"sound a like\" substitutions may have occurred due to the inherent limitations of voice recognition software. Read the chart carefully and recognize, using context, where substitutions have occurred.If you have any questions, please contact the dictating provider.  "

## 2024-12-10 NOTE — ASSESSMENT & PLAN NOTE
- Urology on board.  - Had hematuria.  Being evaluated by urology team.  May be traumatic Dupont insertion

## 2024-12-10 NOTE — PLAN OF CARE
Problem: PAIN - ADULT  Goal: Verbalizes/displays adequate comfort level or baseline comfort level  Description: Interventions:  - Encourage patient to monitor pain and request assistance  - Assess pain using appropriate pain scale  - Administer analgesics based on type and severity of pain and evaluate response  - Implement non-pharmacological measures as appropriate and evaluate response  - Consider cultural and social influences on pain and pain management  - Notify physician/advanced practitioner if interventions unsuccessful or patient reports new pain  Outcome: Progressing     Problem: INFECTION - ADULT  Goal: Absence or prevention of progression during hospitalization  Description: INTERVENTIONS:  - Assess and monitor for signs and symptoms of infection  - Monitor lab/diagnostic results  - Monitor all insertion sites, i.e. indwelling lines, tubes, and drains  - Monitor endotracheal if appropriate and nasal secretions for changes in amount and color  - Crane appropriate cooling/warming therapies per order  - Administer medications as ordered  - Instruct and encourage patient and family to use good hand hygiene technique  - Identify and instruct in appropriate isolation precautions for identified infection/condition  Outcome: Progressing  Goal: Absence of fever/infection during neutropenic period  Description: INTERVENTIONS:  - Monitor WBC    Outcome: Progressing     Problem: SAFETY ADULT  Goal: Patient will remain free of falls  Description: INTERVENTIONS:  - Educate patient/family on patient safety including physical limitations  - Instruct patient to call for assistance with activity   - Consult OT/PT to assist with strengthening/mobility   - Keep Call bell within reach  - Keep bed low and locked with side rails adjusted as appropriate  - Keep care items and personal belongings within reach  - Initiate and maintain comfort rounds  - Make Fall Risk Sign visible to staff  - Apply yellow socks and bracelet  for high fall risk patients  - Consider moving patient to room near nurses station  Outcome: Progressing  Goal: Maintain or return to baseline ADL function  Description: INTERVENTIONS:  -  Assess patient's ability to carry out ADLs; assess patient's baseline for ADL function and identify physical deficits which impact ability to perform ADLs (bathing, care of mouth/teeth, toileting, grooming, dressing, etc.)  - Assess/evaluate cause of self-care deficits   - Assess range of motion  - Assess patient's mobility; develop plan if impaired  - Assess patient's need for assistive devices and provide as appropriate  - Encourage maximum independence but intervene and supervise when necessary  - Involve family in performance of ADLs  - Assess for home care needs following discharge   - Consider OT consult to assist with ADL evaluation and planning for discharge  - Provide patient education as appropriate  Outcome: Progressing  Goal: Maintains/Returns to pre admission functional level  Description: INTERVENTIONS:  - Perform AM-PAC 6 Click Basic Mobility/ Daily Activity assessment daily.  - Set and communicate daily mobility goal to care team and patient/family/caregiver.   - Collaborate with rehabilitation services on mobility goals if consulted  - Out of bed for toileting  - Record patient progress and toleration of activity level   Outcome: Progressing     Problem: DISCHARGE PLANNING  Goal: Discharge to home or other facility with appropriate resources  Description: INTERVENTIONS:  - Identify barriers to discharge w/patient and caregiver  - Arrange for needed discharge resources and transportation as appropriate  - Identify discharge learning needs (meds, wound care, etc.)  - Arrange for interpretive services to assist at discharge as needed  - Refer to Case Management Department for coordinating discharge planning if the patient needs post-hospital services based on physician/advanced practitioner order or complex needs  related to functional status, cognitive ability, or social support system  Outcome: Progressing     Problem: Knowledge Deficit  Goal: Patient/family/caregiver demonstrates understanding of disease process, treatment plan, medications, and discharge instructions  Description: Complete learning assessment and assess knowledge base.  Interventions:  - Provide teaching at level of understanding  - Provide teaching via preferred learning methods  Outcome: Progressing     Problem: Decreased Cardiac Output  Goal: Cardiac output adequate for individual needs  Description: INTERVENTIONS: Monitor for signs and symptoms of decreased cardiac output   - Monitor for dyspnea with exertion and at rest  - Monitor for orthopnea  - Monitor for signs of tachycardia. Place patient on telemetry monitoring.  - Assess patient for jugular vein distention  - Assess patient for lower extremity edema and poor peripheral perfusion   - Auscultate lung sound for Fine bibasilar crackles   - Monitor for cardiac arrythmias   - Administer beta blockers, antiarrhythmic, and blood pressure medications as ordered    Outcome: Progressing     Problem: Impaired Gas Exchange  Goal: Optimize oxygenation and ensure adequate ventilation  Description: INTERVENTIONS: Monitor for signs and symptoms of respiratory distress                - Elevate HOB or use high fowlers to promote lung expansion                - Administer oxygen as ordered to maintain adequate oxygenation                - Encourage use of IS to promote lung expansion and prevent PN                - Monitor ABGs to assess oxygenation status                - Monitor blood oxygen level to maintain adequate oxygenation                - Encourage cough and deep breathing exercises to promote lung expansion                - Monitor patient's mental status for increased confusion    Outcome: Progressing     Problem: Excess Fluid Volume  Goal: Patient is able to achieve and maintain  homeostasis  Description: INTERVENTIONS: Monitor for sign and symptoms of fluid overload  - Evaluate LE edema every shift  - Elevate LE to prevent dependent edema  - Apply SHIV stockings as ordered   - Monitor ankle circumference daily  - Assess for jugular vein distention  - Evaluate provider orders for the CHF diuretic algorithm. Administer diuretics as ordered  - Weigh the patient daily at 0600 and report a weight gain of five pounds or more   - Strict intake and output  - Monitor fluid intake and adhere to fluid restrictions  - Assess lung sounds every shift and as needed  - Monitor vital signs and lab values (CBC, chem, BUN, BNP)  - Measure and document urine output    Outcome: Progressing     Problem: Activity Intolerance  Goal: Patient is able to perform activities within their limitations  Description: INTERVENTIONS:                       -   Alternate periods of activity with periods of rest                 -   Patients is able to maintain normal vitals heart rhythm during activity                 -   Gradually increase activity and exercise as patient can tolerate                 -   Monitor blood pressure and heart before and after exercise                  -   Monitor blood oxygen saturation during activity and apply oxygen as needed    Outcome: Progressing     Problem: Knowledge Deficit  Goal: Patient is able to verbalize understanding of Heart Failure after education  Description: INTERVENTIONS:  - Educate the patient and family on signs and symptoms of HF  - Provide the patient with HF education and HF zone tool  - Educate on the importance of daily weight in the AM and reporting a weight gain               of 3 or more pounds to their primary care physician  - Monitor for SOB  - Maintain and sodium and fluid restriction  - Educate the patient on the importance of medications such as: diuretics, betablockers,               antiarrhythmics and their purpose, dose, route, side effects and labs                if they are needed    Outcome: Progressing     Problem: Prexisting or High Potential for Compromised Skin Integrity  Goal: Skin integrity is maintained or improved  Description: INTERVENTIONS:  - Identify patients at risk for skin breakdown  - Assess and monitor skin integrity  - Assess and monitor nutrition and hydration status  - Monitor labs   - Assess for incontinence   - Turn and reposition patient  - Assist with mobility/ambulation  - Relieve pressure over bony prominences  - Avoid friction and shearing  - Provide appropriate hygiene as needed including keeping skin clean and dry  - Evaluate need for skin moisturizer/barrier cream  - Collaborate with interdisciplinary team   - Patient/family teaching  - Consider wound care consult   Outcome: Progressing     Problem: Nutrition/Hydration-ADULT  Goal: Nutrient/Hydration intake appropriate for improving, restoring or maintaining nutritional needs  Description: Monitor and assess patient's nutrition/hydration status for malnutrition. Collaborate with interdisciplinary team and initiate plan and interventions as ordered.  Monitor patient's weight and dietary intake as ordered or per policy. Utilize nutrition screening tool and intervene as necessary. Determine patient's food preferences and provide high-protein, high-caloric foods as appropriate.     INTERVENTIONS:  - Monitor oral intake, urinary output, labs, and treatment plans  - Assess nutrition and hydration status and recommend course of action  - Evaluate amount of meals eaten  - Assist patient with eating if necessary   - Allow adequate time for meals  - Recommend/ encourage appropriate diets, oral nutritional supplements, and vitamin/mineral supplements  - Order, calculate, and assess calorie counts as needed  - Recommend, monitor, and adjust tube feedings and TPN/PPN based on assessed needs  - Assess need for intravenous fluids  - Provide specific nutrition/hydration education as appropriate  - Include  patient/family/caregiver in decisions related to nutrition  Outcome: Progressing

## 2024-12-10 NOTE — ASSESSMENT & PLAN NOTE
-Noted weight increase from April  - CT chest small R pl effusion, mod L pl effusion, atelectasis  - To note has also high salt intake during the holidays  - Current creatinine is below baseline  - Lasix was increased 12/8 and patient is tolerating IV Lasix well.  - See discussion above

## 2024-12-10 NOTE — PLAN OF CARE
Problem: PHYSICAL THERAPY ADULT  Goal: Performs mobility at highest level of function for planned discharge setting.  See evaluation for individualized goals.  Description: Treatment/Interventions: ADL retraining, Functional transfer training, LE strengthening/ROM, Therapeutic exercise, Endurance training, Patient/family training, Equipment eval/education, Bed mobility, Gait training, Compensatory technique education          See flowsheet documentation for full assessment, interventions and recommendations.  Outcome: Progressing  Note: Prognosis: Good  Problem List: Decreased strength, Decreased range of motion, Decreased endurance, Impaired balance, Decreased mobility, Decreased coordination  Assessment: Patient seen for Physical Therapy evaluation. Patient admitted with Acute on chronic combined systolic and diastolic congestive heart failure (HCC).  Comorbidities affecting patient's physical performance include: .  Personal factors affecting patient at time of initial evaluation include: ambulating with assistive device, inability to ambulate household distances, inability to navigate community distances, inability to navigate level surfaces without external assistance, inability to perform dynamic tasks in community, limited home support, inability to perform physical activity, inability to perform ADLS, and inability to perform IADLS . Prior to admission, patient was independent with functional mobility with walker, requiring assist for ADLS, requiring assist for IADLS, ambulating household distance, and home with family assist.  Please find objective findings from Physical Therapy assessment regarding body systems outlined above with impairments and limitations including weakness, decreased ROM, impaired balance, decreased endurance, impaired coordination, gait deviations, decreased activity tolerance, decreased functional mobility tolerance, fall risk, and SOB upon exertion.  The Barthel Index was used as  Unable to see patient  Called patient and made aware   Provided him with phone number for [facility] #[phone] a functional outcome tool presenting with a score of Barthel Index Score: 55 today indicating marked limitations of functional mobility and ADLS.  Patient's clinical presentation is currently unstable/unpredictable as seen in patient's presentation of vital sign response, changing level of pain, increased fall risk, new onset of impairment of functional mobility, decreased endurance, and new onset of weakness. Pt would benefit from continued Physical Therapy treatment to address deficits as defined above and maximize level of functional mobility. As demonstrated by objective findings, the assigned level of complexity for this evaluation is high.The patient's -[facility] Basic Mobility Inpatient Short Form Raw Score is 17. A Raw score of greater than 16 suggests the patient may benefit from discharge to home. Please also refer to the recommendation of the Physical Therapist for safe discharge planning.        Rehab Resource Intensity Level, PT: III (Minimum Resource Intensity)    See flowsheet documentation for full assessment.

## 2024-12-10 NOTE — ASSESSMENT & PLAN NOTE
Noted on 12/10 during rounds most probably traumatic secondary to Dupont's catheter insertion  No clots noted    -Hold Eliquis  -Consult urology

## 2024-12-10 NOTE — ASSESSMENT & PLAN NOTE
Baseline creatinine closer to 2.0.  Nephrology consulted    Results from last 7 days   Lab Units 12/10/24  0513 12/09/24  0453 12/08/24  0434 12/07/24  1439   BUN mg/dL 57* 60* 65* 63*   CREATININE mg/dL 1.95* 1.94* 1.95* 1.97*   EGFR ml/min/1.73sq m 30 30 30 29   Renal function appears baseline

## 2024-12-10 NOTE — ASSESSMENT & PLAN NOTE
-Outpatient nephrologist Dr. 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  Lasix was increased to IV 40 mg twice daily on 12/8  - 12/9-creatinine stable 1.9 mg/dL.  Had adequate urine output with increased Lasix yesterday.  Patient still with pulmonary edema on exam. Na, K, bicarb appropriate. Hypoglycemia improved with management by primary team  - 12/10-creatinine stable 1.9.  Sodium, potassium, bicarbonate, phosphorus and magnesium stable and appropriate.    Plan  - Continue Lasix 40 mg IV twice daily today and likely transition to oral diuretic tomorrow.  Still with some signs of volume overload with slight pulmonary edema on exam.  Agree with checking x-ray to confirm  - BMP in a.m.  - I/os; avoid nephrotoxic agents  - Avoid hypotension  - Follow-up duplex lower extremities

## 2024-12-10 NOTE — CONSULTS
H&P Exam - Urology       Patient: Yao Tipton   : 1940 Sex: male   MRN: [id number]     [facility]: [id number]      History of Present Illness   HPI:  Yao Tipton is a 84 y.o. male who presents with worsening acute on chronic CHF history of BPH obstruction on tamsulosin 0.4 mg going into retention now with indwelling Dupont catheter somewhat poor historian states that he does have a fair to slow stream getting up at least once a night has never seen urologist        Review of Systems:   Constitutional:  Negative for activity change, fever, chills and diaphoresis.   HENT: Negative for hearing loss and trouble swallowing.   Eyes: Negative for itching and visual disturbance.   Respiratory: Negative for chest tightness and shortness of breath.   Cardiovascular: Negative for chest pain, edema.   Gastrointestinal: Negative for abdominal distention, na abdominal pain, constipation, diarrhea, Nausea and vomiting.   Genitourinary: Negative for decreased urine volume, difficulty urinating, dysuria, enuresis, frequency, hematuria and urgency.   Musculoskeletal: Negative for gait problem and myalgias.   Neurological: Negative for dizziness and headaches.   Hematological: Does not bruise/bleed easily.       Historical Information   Past Medical History:   Diagnosis Date    Atrial fibrillation (HCC)     BPH (benign prostatic hyperplasia)     CHF (congestive heart failure) (HCC)     Chronic kidney disease     Coronary artery disease     Diabetes mellitus (HCC)     Hyperlipidemia     Hypertension     Hyponatremia 2024     Past Surgical History:   Procedure Laterality Date    CARDIAC CATHETERIZATION N/A 2023    Procedure: Cardiac pericardiocentesis;  Surgeon: Shanna Adame DO;  Location: [facility];  Service: Cardiology    CARDIAC CATHETERIZATION N/A 2023    Procedure: Cardiac RHC;  Surgeon: Shanna Adame DO;  Location: [facility];  Service: Cardiology    EYE SURGERY      IR CHEST  "TUBE PLACEMENT  6/24/2023    IR CHEST TUBE PLACEMENT  7/28/2023    IR PLEURAL EFFUSION LONG-TERM CATHETER PLACEMENT  8/7/2023    IR PLEURAL EFFUSION LONG-TERM CATHETER REMOVAL  1/3/2024    IR THORACENTESIS  12/11/2023    SKIN CANCER EXCISION      TONSILLECTOMY       Social History   Social History     Substance and Sexual Activity   Alcohol Use Not Currently    Alcohol/week: 0.0 standard drinks of alcohol    Comment: special occasions     Social History     Substance and Sexual Activity   Drug Use Not Currently     Social History     Tobacco Use   Smoking Status Never   Smokeless Tobacco Former   Tobacco Comments    Smoked a pipe 50 years ago     Family History:   Family History   Problem Relation Age of Onset    Heart disease Mother     Diabetes Father     Vision loss Father     Cancer Sister     Diabetes Sister        Meds/Allergies     Medications Prior to Admission:     allopurinol (ZYLOPRIM) 100 mg tablet    apixaban (Eliquis) 2.5 mg    ascorbic acid (VITAMIN C) 500 MG tablet    atorvastatin (LIPITOR) 40 mg tablet    cholecalciferol (VITAMIN D3) 1,000 units tablet    ferrous sulfate 325 (65 Fe) mg tablet    glimepiride (AMARYL) 2 mg tablet    insulin lispro (HumaLOG) 100 units/mL injection    insulin lispro (HumaLOG) 100 units/mL injection    latanoprost (XALATAN) 0.005 % ophthalmic solution    Semglee, yfgn, 100 UNIT/ML SOPN    tamsulosin (FLOMAX) 0.4 mg    timolol (TIMOPTIC) 0.5 % ophthalmic solution    torsemide (DEMADEX) 20 mg tablet    ZINC OXIDE PO    ALPRAZolam (XANAX) 0.5 mg tablet    Blood Pressure Monitor NILTON    docusate sodium (COLACE) 100 mg capsule    halobetasol (ULTRAVATE) 0.05 % cream    insulin aspart (NovoLOG FlexPen) 100 UNIT/ML injection pen    Sure Comfort Pen Needles 32G X 4 MM MISC  Allergies   Allergen Reactions    Elemental Sulfur     Sulfa Antibiotics        Objective   Vitals: /58 (BP Location: Right arm)   Pulse 76   Temp 97.5 °F (36.4 °C) (Temporal)   Resp 18   Ht 5' 8\" " "(1.727 m)   Wt 64 kg (141 lb)   SpO2 95%   BMI 21.44 kg/m²     Physical Exam:  General Alert awake   Normocephalic atraumatic PERRLA  Lungs clear bilaterally  Cardiac normal S1 normal S2  Abdomen soft, flank pain  Uncircumcised penis  2 normal testicles  Extremities no edema    I/O last 24 hours:  In: 240 [P.O.:240]  Out: 800 [Urine:800]    Invasive Devices       Peripheral Intravenous Line  Duration             Peripheral IV 08/19/24 Distal;Left;Ventral (anterior) Forearm 112 days    Peripheral IV 12/08/24 Dorsal (posterior);Right Forearm 2 days              Drain  Duration             Urethral Catheter 16 Fr. <1 day                        Lab Results: CBC:   Lab Results   Component Value Date    WBC 5.22 12/09/2024    HGB 9.7 (L) 12/09/2024    HCT 30.7 (L) 12/09/2024    MCV 99 (H) 12/09/2024    PLT 89 (L) 12/09/2024    RBC 3.09 (L) 12/09/2024    MCH 31.4 12/09/2024    MCHC 31.6 12/09/2024    RDW 16.6 (H) 12/09/2024    MPV 9.5 12/09/2024    NRBC 0 12/07/2024     CMP:   Lab Results   Component Value Date    CL 99 12/10/2024    CO2 31 12/10/2024    BUN 57 (H) 12/10/2024    CREATININE 1.95 (H) 12/10/2024    CALCIUM 8.0 (L) 12/10/2024    AST 22 12/09/2024    ALT 22 12/09/2024    ALKPHOS 122 (H) 12/09/2024    EGFR 30 12/10/2024     Urinalysis:   Lab Results   Component Value Date    COLORU Light Yellow 08/20/2024    CLARITYU Clear 08/20/2024    SPECGRAV 1.010 08/20/2024    PHUR 6.5 08/20/2024    PHUR 5.0 07/03/2019    PHUR 5.5 01/10/2019    LEUKOCYTESUR Small (A) 08/20/2024    NITRITE Negative 08/20/2024    GLUCOSEU Negative 08/20/2024    KETONESU Negative 08/20/2024    BILIRUBINUR Negative 08/20/2024    BLOODU Negative 08/20/2024     Urine Culture:   Lab Results   Component Value Date    URINECX 10,000-19,000 cfu/ml 07/27/2023     PSA: No results found for: \"PSA\"        Assessment/ Plan:  Urinary retention  History of obstructive symptoms  Acute on chronic congestive heart failure  Chronic kidney disease  Sinew " Dupont catheter  Continue tamsulosin when ambulating voiding trial few days    Cliff Erazo MD

## 2024-12-10 NOTE — ASSESSMENT & PLAN NOTE
With history of urinary retention previously  Continue tamsulosin  Consult urology given acute urinary retention noted on 12/9.

## 2024-12-10 NOTE — DISCHARGE INSTR - OTHER ORDERS
Wound Care Plan:     1-Cleanse wounds to bilateral sacrobuttocks with foaming cleanser or dimethicone wipes and pat dry. Apply thin layer of Triad paste then apply Nystatin powder over the Triad paste 2x/day as directed.  2-Cleanse wounds to bilateral lower legs with normal saline & pat dry. Apply thin layer of Normlgel to wounds, Adaptic cut to size of wounds, ABD, rolled gauze and tape. Change 3x/day & as needed for soilage/dislodgement.  3-Cleanse wound to bridge of nose with mild soap and water & pat dry. Apply 3M No Sting daily for protection.  4-Apply Prevent barrier cream or equivalent to bilateral heels & left 5th lateral toe 2x/day and as needed - please do not apply in-between toes.  5-Float heels on 2 pillows to offload pressure so heels are not in contact with mattress or pillows.  6-Use pressure redistribution cushion in chair when out of bed if able. Avoid prolonged sitting.  7-Moisturize skin daily with skin nourishing cream.  8-Turn/reposition every 2 hrs for pressure re-distribution on skin.   9-Patient may follow up at [facility]. Call Central Scheduling at [phone].

## 2024-12-10 NOTE — WOUND OSTOMY CARE
Progress Note - Wound   Yao Tipton 84 y.o. male MRN: [id number]  Unit/Bed#: [address] [place] [address] Encounter: [id number]        Assessment:   This is an 84 year old male patient admitted on 12/7/24 with congestive heart failure. Patient was AAO x 2 with periods of confusion. He was turned and repositioned with assist of 2 persons and is continent of urine with periods of fecal incontinence.    Assessment Findings:  1-Stage 3 pressure injuries POA to bilateral sacrobuttocks with fungal component due to moisture/incontinence  2-Full thickness traumatic wounds to bilateral lower legs with pink granulation tissue, yellow slough and black eschar. Drainage to right leg wound was serosanguinous mixed with green purulent drainage. Left leg wound had moderate amount of serosanguinous drainage. Orders in place for wound care.  3-Unstageable wound POA to bridge of nose with dry brown eschar - orders in place for wound care.  4-Full thickness wound to left 5th toe - orders in place for wound/skin care.    Wound Care Plan:   1-Cleanse wounds to bilateral sacrobuttocks with foaming cleanser or dimethicone wipes and pat dry. Apply thin layer of Triad paste then apply Nystatin powder over the Triad paste 2x/day as directed.  2-Cleanse wounds to bilateral lower legs with NSS & pat dry. Apply thin layer of Normlgel to wounds, Adaptic cut to size of wounds, ABD, rolled gauze and tape. Change every other day & as needed for soilage/dislodgement.  3-Cleanse wound to bridge of nose with NSS & pat dry. Apply 3M No Sting daily for protection.  4-Apply Prevent barrier cream to bilateral heels & left 5th lateral toe BID and PRN - please do not apply in-between toes.  5-Float heels on 2 pillows to offload pressure so heels are not in contact with mattress or pillows.  6-Ehob pressure redistribution cushion in chair when out of bed if able. Avoid prolonged sitting.  7-Moisturize skin daily with skin nourishing cream.  8-Turn/reposition q2h or when  medically stable for pressure re-distribution on skin.      Full thickness wound to left 5th lateral toe - orders in place for wound/skin care.    Wound 12/08/24 Pressure Injury Buttocks Right;Upper (Active)   Wound Image   12/08/24 0100   Wound Description Granulation tissue;Pink;Slough;Yellow 12/09/24 1443   Pressure Injury Stage Stage 3 12/09/24 1443   Ramandeep-wound Assessment Intact;Scar Tissue 12/09/24 1443   Wound Length (cm) 12.5 cm 12/09/24 1443   Wound Width (cm) 7 cm 12/09/24 1443   Wound Depth (cm) 0.2 cm 12/09/24 1443   Wound Surface Area (cm^2) 87.5 cm^2 12/09/24 1443   Wound Volume (cm^3) 17.5 cm^3 12/09/24 1443   Calculated Wound Volume (cm^3) 17.5 cm^3 12/09/24 1443   Drainage Amount Scant 12/09/24 1443   Drainage Description Serous;Serosanguineous 12/09/24 1443   Treatments Cleansed 12/09/24 1443   Dressing Triad paste 12/09/24 1443   Dressing Changed New 12/09/24 1443   Dressing Status Intact 12/09/24 1443       Wound 12/08/24 Pressure Injury Buttocks Left (Active)   Wound Image   12/09/24 1439   Wound Description Granulation tissue;Pink;Yellow 12/09/24 1439   Pressure Injury Stage Stage 3 12/09/24 1439   Ramandeep-wound Assessment Intact;Scar Tissue 12/09/24 1439   Wound Length (cm) 3 cm 12/09/24 1439   Wound Width (cm) 1.4 cm 12/09/24 1439   Wound Depth (cm) 0.1 cm 12/09/24 1439   Wound Surface Area (cm^2) 4.2 cm^2 12/09/24 1439   Wound Volume (cm^3) 0.42 cm^3 12/09/24 1439   Calculated Wound Volume (cm^3) 0.42 cm^3 12/09/24 1439   Drainage Amount Scant 12/09/24 1439   Drainage Description Serosanguineous 12/09/24 1439   Treatments Cleansed 12/09/24 1439   Dressing Triad paste 12/09/24 1439   Dressing Changed New 12/09/24 1439   Dressing Status Intact 12/09/24 1439       Wound 12/08/24 Pretibial Right (Active)   Wound Image   12/09/24 1429   Wound Description Granulation tissue;Pink;Slough;Yellow 12/09/24 1429   Ramandeep-wound Assessment Intact;Dry;Scaly 12/09/24 1429   Wound Length (cm) 2.5 cm 12/09/24 1429    Wound Width (cm) 0.4 cm 12/09/24 1429   Wound Depth (cm) 0.1 cm 12/09/24 1429   Wound Surface Area (cm^2) 1 cm^2 12/09/24 1429   Wound Volume (cm^3) 0.1 cm^3 12/09/24 1429   Calculated Wound Volume (cm^3) 0.1 cm^3 12/09/24 1429   Drainage Amount Moderate 12/09/24 1429   Drainage Description Serosanguineous;Green 12/09/24 1429   Non-staged Wound Description Full thickness 12/09/24 1429   Treatments Cleansed 12/09/24 1429   Dressing ABD;Dry dressing;Normlgel;Adaptic 12/09/24 1429   Dressing Changed New 12/09/24 1429   Dressing Status Clean;Dry;Intact 12/09/24 1429       Wound 12/08/24 Pretibial Left (Active)   Wound Image   12/09/24 1431   Wound Description Granulation tissue;Pink;Black;Eschar 12/09/24 1431   Ramandeep-wound Assessment Intact;Scar Tissue 12/09/24 1431   Wound Length (cm) 4 cm 12/09/24 1431   Wound Width (cm) 6.4 cm 12/09/24 1431   Wound Depth (cm) 0.1 cm 12/09/24 1431   Wound Surface Area (cm^2) 25.6 cm^2 12/09/24 1431   Wound Volume (cm^3) 2.56 cm^3 12/09/24 1431   Calculated Wound Volume (cm^3) 2.56 cm^3 12/09/24 1431   Drainage Amount Moderate 12/09/24 1431   Drainage Description Serosanguineous 12/09/24 1431   Treatments Cleansed 12/09/24 1431   Dressing ABD;Dry dressing;Normlgel;Adaptic 12/09/24 1431   Dressing Changed New 12/09/24 1431   Dressing Status Clean;Dry;Intact 12/09/24 1431       Wound 12/09/24 Pressure Injury Nose (Active)   Wound Image   12/09/24 1447   Wound Description Dry;Brown;Eschar 12/09/24 1447   Pressure Injury Stage Unstageable 12/09/24 1447   Ramandeep-wound Assessment Intact 12/09/24 1447   Wound Length (cm) 0.3 cm 12/09/24 1447   Wound Width (cm) 0.3 cm 12/09/24 1447   Wound Depth (cm) 0.1 cm 12/09/24 1447   Wound Surface Area (cm^2) 0.09 cm^2 12/09/24 1447   Wound Volume (cm^3) 0.009 cm^3 12/09/24 1447   Calculated Wound Volume (cm^3) 0.01 cm^3 12/09/24 1447   Drainage Amount None 12/09/24 1447   Treatments Cleansed 12/09/24 1447   Dressing 3M No Sting 12/09/24 1447   Dressing  Changed New 12/09/24 1447   Dressing Status Intact 12/09/24 1447     Discussed assessment findings, and plan of care/recommendations with Brodie MEJIA.    Wound care will follow along with patient throughout admission, please call or text with questions and concerns.    Recommendations written as orders.  Kandace Tello MSN, RN, CWON

## 2024-12-10 NOTE — ASSESSMENT & PLAN NOTE
Wt Readings from Last 3 Encounters:   12/10/24 64 kg (141 lb)   08/19/24 67.2 kg (148 lb 3.2 oz)   08/08/24 66.7 kg (147 lb)     History of CKD 4 diabetes mellitus BPH atrial fibrillation and combined CHF who presents to the hospital for worsening leg edema  Nephrology and cardiology consulted.  There is history of dietary indiscretion including consumption of salt food  IV diuresis tailored to 40 mg furosemide twice daily  Continue IV diuresis with goal of net -1 L    Intake/Output Summary (Last 24 hours) at 12/10/2024 1023  Last data filed at 12/9/2024 1837  Gross per 24 hour   Intake 120 ml   Output 800 ml   Net -680 ml

## 2024-12-10 NOTE — PLAN OF CARE
Problem: PAIN - ADULT  Goal: Verbalizes/displays adequate comfort level or baseline comfort level  Description: Interventions:  - Encourage patient to monitor pain and request assistance  - Assess pain using appropriate pain scale  - Administer analgesics based on type and severity of pain and evaluate response  - Implement non-pharmacological measures as appropriate and evaluate response  - Consider cultural and social influences on pain and pain management  - Notify physician/advanced practitioner if interventions unsuccessful or patient reports new pain  Outcome: Progressing     Problem: INFECTION - ADULT  Goal: Absence or prevention of progression during hospitalization  Description: INTERVENTIONS:  - Assess and monitor for signs and symptoms of infection  - Monitor lab/diagnostic results  - Monitor all insertion sites, i.e. indwelling lines, tubes, and drains  - Monitor endotracheal if appropriate and nasal secretions for changes in amount and color  - [place] appropriate cooling/warming therapies per order  - Administer medications as ordered  - Instruct and encourage patient and family to use good hand hygiene technique  - Identify and instruct in appropriate isolation precautions for identified infection/condition  Outcome: Progressing  Goal: Absence of fever/infection during neutropenic period  Description: INTERVENTIONS:  - Monitor WBC    Outcome: Progressing     Problem: SAFETY ADULT  Goal: Patient will remain free of falls  Description: INTERVENTIONS:  - Educate patient/family on patient safety including physical limitations  - Instruct patient to call for assistance with activity   - Consult OT/PT to assist with strengthening/mobility   - Keep Call bell within reach  - Keep bed low and locked with side rails adjusted as appropriate  - Keep care items and personal belongings within reach  - Initiate and maintain comfort rounds  - Make Fall Risk Sign visible to staff  - Offer Toileting every 2 Hours,  in advance of need  - Initiate/Maintain bed alarm  - Obtain necessary fall risk management equipment: no slip socks   - Apply yellow socks and bracelet for high fall risk patients  - Consider moving patient to room near nurses station  Outcome: Progressing  Goal: Maintain or return to baseline ADL function  Description: INTERVENTIONS:  -  Assess patient's ability to carry out ADLs; assess patient's baseline for ADL function and identify physical deficits which impact ability to perform ADLs (bathing, care of mouth/teeth, toileting, grooming, dressing, etc.)  - Assess/evaluate cause of self-care deficits   - Assess range of motion  - Assess patient's mobility; develop plan if impaired  - Assess patient's need for assistive devices and provide as appropriate  - Encourage maximum independence but intervene and supervise when necessary  - Involve family in performance of ADLs  - Assess for home care needs following discharge   - Consider OT consult to assist with ADL evaluation and planning for discharge  - Provide patient education as appropriate  Outcome: Progressing  Goal: Maintains/Returns to pre admission functional level  Description: INTERVENTIONS:  - Perform AM-PAC 6 Click Basic Mobility/ Daily Activity assessment daily.  - Set and communicate daily mobility goal to care team and patient/family/caregiver.   - Collaborate with rehabilitation services on mobility goals if consulted  - Perform Range of Motion 3 times a day.  - Reposition patient every 2 hours.  - Dangle patient 3 times a day  - Stand patient 3 times a day  - Ambulate patient 3 times a day  - Out of bed to chair 3 times a day   - Out of bed for meals 3 times a day  - Out of bed for toileting  - Record patient progress and toleration of activity level   Outcome: Progressing     Problem: DISCHARGE PLANNING  Goal: Discharge to home or other facility with appropriate resources  Description: INTERVENTIONS:  - Identify barriers to discharge w/patient and  caregiver  - Arrange for needed discharge resources and transportation as appropriate  - Identify discharge learning needs (meds, wound care, etc.)  - Arrange for interpretive services to assist at discharge as needed  - Refer to Case Management Department for coordinating discharge planning if the patient needs post-hospital services based on physician/advanced practitioner order or complex needs related to functional status, cognitive ability, or social support system  Outcome: Progressing     Problem: Knowledge Deficit  Goal: Patient/family/caregiver demonstrates understanding of disease process, treatment plan, medications, and discharge instructions  Description: Complete learning assessment and assess knowledge base.  Interventions:  - Provide teaching at level of understanding  - Provide teaching via preferred learning methods  Outcome: Progressing     Problem: Decreased Cardiac Output  Goal: Cardiac output adequate for individual needs  Description: INTERVENTIONS: Monitor for signs and symptoms of decreased cardiac output   - Monitor for dyspnea with exertion and at rest  - Monitor for orthopnea  - Monitor for signs of tachycardia. Place patient on telemetry monitoring.  - Assess patient for jugular vein distention  - Assess patient for lower extremity edema and poor peripheral perfusion   - Auscultate lung sound for Fine bibasilar crackles   - Monitor for cardiac arrythmias   - Administer beta blockers, antiarrhythmic, and blood pressure medications as ordered    Outcome: Progressing     Problem: Impaired Gas Exchange  Goal: Optimize oxygenation and ensure adequate ventilation  Description: INTERVENTIONS: Monitor for signs and symptoms of respiratory distress                - Elevate HOB or use high fowlers to promote lung expansion                - Administer oxygen as ordered to maintain adequate oxygenation                - Encourage use of IS to promote lung expansion and prevent PN                -  Monitor ABGs to assess oxygenation status                - Monitor blood oxygen level to maintain adequate oxygenation                - Encourage cough and deep breathing exercises to promote lung expansion                - Monitor patient's mental status for increased confusion    Outcome: Progressing     Problem: Excess Fluid Volume  Goal: Patient is able to achieve and maintain homeostasis  Description: INTERVENTIONS: Monitor for sign and symptoms of fluid overload  - Evaluate LE edema every shift  - Elevate LE to prevent dependent edema  - Apply SHIV stockings as ordered   - Monitor ankle circumference daily  - Assess for jugular vein distention  - Evaluate provider orders for the CHF diuretic algorithm. Administer diuretics as ordered  - Weigh the patient daily at 0600 and report a weight gain of five pounds or more   - Strict intake and output  - Monitor fluid intake and adhere to fluid restrictions  - Assess lung sounds every shift and as needed  - Monitor vital signs and lab values (CBC, chem, BUN, BNP)  - Measure and document urine output    Outcome: Progressing     Problem: Activity Intolerance  Goal: Patient is able to perform activities within their limitations  Description: INTERVENTIONS:                       -   Alternate periods of activity with periods of rest                 -   Patients is able to maintain normal vitals heart rhythm during activity                 -   Gradually increase activity and exercise as patient can tolerate                 -   Monitor blood pressure and heart before and after exercise                  -   Monitor blood oxygen saturation during activity and apply oxygen as needed    Outcome: Progressing     Problem: Knowledge Deficit  Goal: Patient is able to verbalize understanding of Heart Failure after education  Description: INTERVENTIONS:  - Educate the patient and family on signs and symptoms of HF  - Provide the patient with HF education and HF zone tool  - Educate on  the importance of daily weight in the AM and reporting a weight gain               of 3 or more pounds to their primary care physician  - Monitor for SOB  - Maintain and sodium and fluid restriction  - Educate the patient on the importance of medications such as: diuretics, betablockers,               antiarrhythmics and their purpose, dose, route, side effects and labs               if they are needed    Outcome: Progressing     Problem: Prexisting or High Potential for Compromised Skin Integrity  Goal: Skin integrity is maintained or improved  Description: INTERVENTIONS:  - Identify patients at risk for skin breakdown  - Assess and monitor skin integrity  - Assess and monitor nutrition and hydration status  - Monitor labs   - Assess for incontinence   - Turn and reposition patient  - Assist with mobility/ambulation  - Relieve pressure over bony prominences  - Avoid friction and shearing  - Provide appropriate hygiene as needed including keeping skin clean and dry  - Evaluate need for skin moisturizer/barrier cream  - Collaborate with interdisciplinary team   - Patient/family teaching  - Consider wound care consult   Outcome: Progressing     Problem: Nutrition/Hydration-ADULT  Goal: Nutrient/Hydration intake appropriate for improving, restoring or maintaining nutritional needs  Description: Monitor and assess patient's nutrition/hydration status for malnutrition. Collaborate with interdisciplinary team and initiate plan and interventions as ordered.  Monitor patient's weight and dietary intake as ordered or per policy. Utilize nutrition screening tool and intervene as necessary. Determine patient's food preferences and provide high-protein, high-caloric foods as appropriate.     INTERVENTIONS:  - Monitor oral intake, urinary output, labs, and treatment plans  - Assess nutrition and hydration status and recommend course of action  - Evaluate amount of meals eaten  - Assist patient with eating if necessary   - Allow  adequate time for meals  - Recommend/ encourage appropriate diets, oral nutritional supplements, and vitamin/mineral supplements  - Order, calculate, and assess calorie counts as needed  - Recommend, monitor, and adjust tube feedings and TPN/PPN based on assessed needs  - Assess need for intravenous fluids  - Provide specific nutrition/hydration education as appropriate  - Include patient/family/caregiver in decisions related to nutrition  Outcome: Progressing

## 2024-12-10 NOTE — ASSESSMENT & PLAN NOTE
Lab Results   Component Value Date    HGBA1C 7.7 (H) 12/07/2024     Recent Labs     12/09/24  2002 12/10/24  0620 12/10/24  0641 12/10/24  0710   POCGLU 148* 30* 116 129     Prior to admission on glimepiride  Hypoglycemia noted with glargine.  Patient was on glargine 5 units nightly  Sliding scale only for now  Monitor glucose log

## 2024-12-11 LAB
ANION GAP SERPL CALCULATED.3IONS-SCNC: 7 MMOL/L (ref 4–13)
BUN SERPL-MCNC: 56 MG/DL (ref 5–25)
CALCIUM SERPL-MCNC: 7.9 MG/DL (ref 8.4–10.2)
CHLORIDE SERPL-SCNC: 97 MMOL/L (ref 96–108)
CO2 SERPL-SCNC: 29 MMOL/L (ref 21–32)
CREAT SERPL-MCNC: 1.96 MG/DL (ref 0.6–1.3)
ERYTHROCYTE [DISTWIDTH] IN BLOOD BY AUTOMATED COUNT: 16.5 % (ref 11.6–15.1)
GFR SERPL CREATININE-BSD FRML MDRD: 30 ML/MIN/1.73SQ M
GLUCOSE SERPL-MCNC: 183 MG/DL (ref 65–140)
GLUCOSE SERPL-MCNC: 198 MG/DL (ref 65–140)
GLUCOSE SERPL-MCNC: 242 MG/DL (ref 65–140)
GLUCOSE SERPL-MCNC: 80 MG/DL (ref 65–140)
GLUCOSE SERPL-MCNC: 93 MG/DL (ref 65–140)
HCT VFR BLD AUTO: 30.4 % (ref 36.5–49.3)
HGB BLD-MCNC: 9.8 G/DL (ref 12–17)
MAGNESIUM SERPL-MCNC: 2 MG/DL (ref 1.9–2.7)
MCH RBC QN AUTO: 31.7 PG (ref 26.8–34.3)
MCHC RBC AUTO-ENTMCNC: 32.2 G/DL (ref 31.4–37.4)
MCV RBC AUTO: 98 FL (ref 82–98)
PHOSPHATE SERPL-MCNC: 3.4 MG/DL (ref 2.3–4.1)
PLATELET # BLD AUTO: 84 THOUSANDS/UL (ref 149–390)
PMV BLD AUTO: 10.6 FL (ref 8.9–12.7)
POTASSIUM SERPL-SCNC: 4 MMOL/L (ref 3.5–5.3)
QRS AXIS: 11 DEGREES
QRS AXIS: 13 DEGREES
QRSD INTERVAL: 82 MS
QRSD INTERVAL: 84 MS
QT INTERVAL: 384 MS
QT INTERVAL: 420 MS
QTC INTERVAL: 390 MS
QTC INTERVAL: 440 MS
RBC # BLD AUTO: 3.09 MILLION/UL (ref 3.88–5.62)
SODIUM SERPL-SCNC: 133 MMOL/L (ref 135–147)
T WAVE AXIS: 238 DEGREES
T WAVE AXIS: 58 DEGREES
VENTRICULAR RATE: 62 BPM
VENTRICULAR RATE: 66 BPM
WBC # BLD AUTO: 4.1 THOUSAND/UL (ref 4.31–10.16)

## 2024-12-11 PROCEDURE — 80048 BASIC METABOLIC PNL TOTAL CA: CPT | Performed by: INTERNAL MEDICINE

## 2024-12-11 PROCEDURE — 84100 ASSAY OF PHOSPHORUS: CPT | Performed by: INTERNAL MEDICINE

## 2024-12-11 PROCEDURE — 97167 OT EVAL HIGH COMPLEX 60 MIN: CPT

## 2024-12-11 PROCEDURE — 83735 ASSAY OF MAGNESIUM: CPT | Performed by: INTERNAL MEDICINE

## 2024-12-11 PROCEDURE — 85027 COMPLETE CBC AUTOMATED: CPT | Performed by: INTERNAL MEDICINE

## 2024-12-11 PROCEDURE — 97535 SELF CARE MNGMENT TRAINING: CPT

## 2024-12-11 PROCEDURE — 82948 REAGENT STRIP/BLOOD GLUCOSE: CPT

## 2024-12-11 PROCEDURE — 93010 ELECTROCARDIOGRAM REPORT: CPT | Performed by: INTERNAL MEDICINE

## 2024-12-11 PROCEDURE — 99232 SBSQ HOSP IP/OBS MODERATE 35: CPT

## 2024-12-11 PROCEDURE — 99232 SBSQ HOSP IP/OBS MODERATE 35: CPT | Performed by: INTERNAL MEDICINE

## 2024-12-11 RX ORDER — TORSEMIDE 20 MG/1
40 TABLET ORAL
Status: DISCONTINUED | OUTPATIENT
Start: 2024-12-11 | End: 2024-12-15 | Stop reason: HOSPADM

## 2024-12-11 RX ADMIN — NYSTATIN: 100000 POWDER TOPICAL at 17:27

## 2024-12-11 RX ADMIN — DOCUSATE SODIUM 100 MG: 100 CAPSULE, LIQUID FILLED ORAL at 17:26

## 2024-12-11 RX ADMIN — ZINC SULFATE 220 MG (50 MG) CAPSULE 220 MG: CAPSULE at 08:32

## 2024-12-11 RX ADMIN — FUROSEMIDE 40 MG: 10 INJECTION, SOLUTION INTRAMUSCULAR; INTRAVENOUS at 08:32

## 2024-12-11 RX ADMIN — SENNOSIDES 17.2 MG: 8.6 TABLET, FILM COATED ORAL at 22:01

## 2024-12-11 RX ADMIN — INSULIN LISPRO 1 UNITS: 100 INJECTION, SOLUTION INTRAVENOUS; SUBCUTANEOUS at 16:38

## 2024-12-11 RX ADMIN — Medication 2000 UNITS: at 08:32

## 2024-12-11 RX ADMIN — TIMOLOL MALEATE 1 DROP: 5 SOLUTION OPHTHALMIC at 08:32

## 2024-12-11 RX ADMIN — INSULIN LISPRO 2 UNITS: 100 INJECTION, SOLUTION INTRAVENOUS; SUBCUTANEOUS at 22:03

## 2024-12-11 RX ADMIN — FAMOTIDINE 10 MG: 20 TABLET, FILM COATED ORAL at 21:58

## 2024-12-11 RX ADMIN — ATORVASTATIN CALCIUM 40 MG: 40 TABLET, FILM COATED ORAL at 16:20

## 2024-12-11 RX ADMIN — DOCUSATE SODIUM 100 MG: 100 CAPSULE, LIQUID FILLED ORAL at 08:32

## 2024-12-11 RX ADMIN — OXYCODONE HYDROCHLORIDE AND ACETAMINOPHEN 500 MG: 500 TABLET ORAL at 08:32

## 2024-12-11 RX ADMIN — LATANOPROST 1 DROP: 50 SOLUTION OPHTHALMIC at 23:56

## 2024-12-11 RX ADMIN — TORSEMIDE 40 MG: 20 TABLET ORAL at 16:20

## 2024-12-11 RX ADMIN — ALLOPURINOL 100 MG: 100 TABLET ORAL at 17:26

## 2024-12-11 RX ADMIN — POLYETHYLENE GLYCOL 3350 17 G: 17 POWDER, FOR SOLUTION ORAL at 08:33

## 2024-12-11 RX ADMIN — TAMSULOSIN HYDROCHLORIDE 0.4 MG: 0.4 CAPSULE ORAL at 16:20

## 2024-12-11 RX ADMIN — INSULIN LISPRO 1 UNITS: 100 INJECTION, SOLUTION INTRAVENOUS; SUBCUTANEOUS at 12:14

## 2024-12-11 RX ADMIN — FERROUS SULFATE TAB 325 MG (65 MG ELEMENTAL FE) 325 MG: 325 (65 FE) TAB at 07:57

## 2024-12-11 RX ADMIN — ALLOPURINOL 100 MG: 100 TABLET ORAL at 08:32

## 2024-12-11 RX ADMIN — NYSTATIN: 100000 POWDER TOPICAL at 08:32

## 2024-12-11 NOTE — PROGRESS NOTES
Progress Note - Nephrology   Name: Yao Tipton 84 y.o. male I MRN: [id number]  Unit/Bed#: [address] [place] [address]01 I Date of Admission: 12/7/2024   Date of Service: 12/11/2024 I Hospital Day: 3    Assessment & Plan  Chronic kidney disease, stage 4 (severe) ([facility])  -Outpatient nephrologist Dr. 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  Lasix was increased to IV 40 mg twice daily on 12/8  - 12/9-creatinine stable 1.9 mg/dL.  Had adequate urine output with increased Lasix yesterday.  Patient still with pulmonary edema on exam. Na, K, bicarb appropriate. Hypoglycemia improved with management by primary team  - 12/10-creatinine stable 1.9.  Sodium, potassium, bicarbonate, phosphorus and magnesium stable and appropriate.  Chest x-ray with persistent pulmonary edema and pleural effusions and persistent bibasilar airspace opacity  - 12/11 -creatinine stable 1.9.  Sodium borderline low 133 will monitor for now.  Potassium is okay.  Will change IV Lasix to torsemide    Plan  - Patient is volume state today overall is improved.  We diuresed the patient further yesterday with IV diuretics and chest x-ray to confirm pulmonary edema.  On clinical exam 12/11, weight is improving, urine output is improving.  - Change patient to oral torsemide.  Was on 60 mg once a day at home, changed to 40 mg twice daily starting tonight  - BMP in a.m.  - I/os; avoid nephrotoxic agents  - Avoid hypotension  - Lower extremity PVD-currently asymptomatic but does have wounds on lower extremities.  Further management per primary team.  No DVT.    Acute on chronic combined systolic and diastolic congestive heart failure (HCC)  -Noted weight increase from April  - CT chest small R pl  effusion, mod L pl effusion, atelectasis  - To note has also high salt intake during the holidays  - Current creatinine is below baseline  - Lasix was increased 12/8 and patient is tolerating IV Lasix well.  Changed to oral torsemide on 12/11  - See discussion above    Essential hypertension  Avoid hypotension and monitoring closely with diuresis on board  Chronic atrial fibrillation (HCC)  -Per primary team  Thrombocytopenia (HCC)  -Chronic thrombocytopenia.  Monitor per primary team  BPH (benign prostatic hyperplasia)  -On tamsulosin  - Continue urinary retention protocol-required straight catheterization which revealed 600 cc urine output  Open wound of both lower extremities  -- Continue diuresis-wound management per primary team  Acute urinary retention  - Urology on board.  - Had hematuria.  Being evaluated by urology team.  May be traumatic Dupont insertion    I have reviewed the nephrology recommendations including change IV diuretic to oral torsemide, with primary team Attending, and we are in agreement with renal plan including the information outlined above.     Subjective   Brief History of Admission - 84-year-old male with a past medical history of CKD stage IV, CHF, A-fib, hypertension, diabetes, BPH who initially presents with exertional shortness of breath. Was advised to come to the ER by his son. Nephrology is on board for CKD.     Pt denies complaints. No SOB. UOP 2 L.     Objective :  Temp:  [97.7 °F (36.5 °C)-98.1 °F (36.7 °C)] 97.8 °F (36.6 °C)  HR:  [70-75] 75  BP: (113-150)/(57-75) 118/66  Resp:  [18-19] 18  SpO2:  [95 %-98 %] 97 %  O2 Device: None (Room air)    Current Weight: Weight - Scale: 62.8 kg (138 lb 8 oz)  First Weight: Weight - Scale: 66.7 kg (147 lb)  I/O         12/09 0701  12/10 0700 12/10 0701 12/11 0700 12/11 0701 12/12 0700    P.O. 240 420 [id number]    Total Intake(mL/kg) 240 (3.8) 420 (6.7) 59826 (194.9)    Urine (mL/kg/hr) 800 (0.5) 2000 (1.3)     Stool 0      Total Output 800  2000     Net -922 -4670 +12114           Unmeasured Stool Occurrence 1 x            Physical Exam  General: NAD  Skin: no rash  Eyes: anicteric sclera  ENT: moist mucous membrane  Neck: supple  Chest: CTA b/l, no ronchii, no wheeze, no rubs, no rales  CVS: s1s2, no murmur, no gallop, no rub  Abdomen: soft, nontender, nl sounds  Extremities: no edema LE b/l  : + victor punch colored urine  Neuro: AAOX3  Psych: normal affect    Medications:    Current Facility-Administered Medications:     acetaminophen (TYLENOL) tablet 650 mg, 650 mg, Oral, Q6H PRN, Veronica Peñarik, CRNP    allopurinol (ZYLOPRIM) tablet 100 mg, 100 mg, Oral, BID, Veronica Peñarifatou, CRNP, 100 mg at 12/11/24 0832    ascorbic acid (VITAMIN C) tablet 500 mg, 500 mg, Oral, Daily, Chanaikwesi Peñarik, CRNP, 500 mg at 12/11/24 0832    atorvastatin (LIPITOR) tablet 40 mg, 40 mg, Oral, Daily With Dinner, Veronica Sellers, CRNP, 40 mg at 12/10/24 1617    Cholecalciferol (VITAMIN D3) tablet 2,000 Units, 2,000 Units, Oral, Daily, Veronica Sellers, CRNP, 2,000 Units at 12/11/24 0832    docusate sodium (COLACE) capsule 100 mg, 100 mg, Oral, BID, Kenney Jeronimo, DO, 100 mg at 12/11/24 0832    famotidine (PEPCID) tablet 10 mg, 10 mg, Oral, HS, Cudonita Peñarifatou, CRNP, 10 mg at 12/10/24 2140    ferrous sulfate tablet 325 mg, 325 mg, Oral, Daily With Breakfast, Veronica Sellers, CRNP, 325 mg at 12/11/24 0757    insulin lispro (HumALOG/ADMELOG) 100 units/mL subcutaneous injection 1-5 Units, 1-5 Units, Subcutaneous, TID AC, 1 Units at 12/10/24 1622 **AND** Fingerstick Glucose (POCT), , , TID AC, Veronica Jonesk, CRNP    insulin lispro (HumALOG/ADMELOG) 100 units/mL subcutaneous injection 1-5 Units, 1-5 Units, Subcutaneous, HS, SUSANA Dunne, 1 Units at 12/10/24 2143    latanoprost (XALATAN) 0.005 % ophthalmic solution 1 drop, 1 drop, Both Eyes, HS, SUSANA Dunne, 1 drop at 12/10/24 2142    nystatin (MYCOSTATIN) powder, , Topical, BID, Kenney Jeronimo DO, Given at 12/11/24 0832     "polyethylene glycol (MIRALAX) packet 17 g, 17 g, Oral, Daily, Kenneywali Jeronimo, DO, 17 g at 12/11/24 0833    senna (SENOKOT) tablet 17.2 mg, 2 tablet, Oral, HS, Kenney Nicholas Jeronimo, DO, 17.2 mg at 12/10/24 2141    sodium chloride (OCEAN) 0.65 % nasal spray 1 spray, 1 spray, Each Nare, Q1H PRN, SUSANA Dunne    tamsulosin (FLOMAX) capsule 0.4 mg, 0.4 mg, Oral, Daily With Dinner, SUSANA Dunne, 0.4 mg at 12/10/24 1617    timolol (TIMOPTIC) 0.5 % ophthalmic solution 1 drop, 1 drop, Both Eyes, Daily, SUSANA Dunne, 1 drop at 12/11/24 0832    torsemide (DEMADEX) tablet 40 mg, 40 mg, Oral, BID (diuretic), Heidi Sweeney MD    zinc sulfate (ZINCATE) capsule 220 mg, 220 mg, Oral, Daily, SUSANA Dunne, 220 mg at 12/11/24 0832      Lab Results: I have reviewed the following results:  Results from last 7 days   Lab Units 12/11/24  0500 12/10/24  1050 12/10/24  0513 12/09/24  0453 12/08/24  0434 12/07/24  1439   WBC Thousand/uL 4.10* 6.69  --  5.22 4.19* 6.13   HEMOGLOBIN g/dL 9.8* 9.8*  --  9.7* 9.9* 10.9*   HEMATOCRIT % 30.4* 30.8*  --  30.7* 30.5* 34.0*   PLATELETS Thousands/uL 84* 85*  --  89* 93* 100*   POTASSIUM mmol/L 4.0  --  4.0 4.0 4.3 4.6   CHLORIDE mmol/L 97  --  99 100 100 97   CO2 mmol/L 29  --  31 28 32 27   BUN mg/dL 56*  --  57* 60* 65* 63*   CREATININE mg/dL 1.96*  --  1.95* 1.94* 1.95* 1.97*   CALCIUM mg/dL 7.9*  --  8.0* 8.0* 8.3* 8.4   MAGNESIUM mg/dL 2.0  --  2.1  --  2.2 2.2   PHOSPHORUS mg/dL 3.4  --  3.2  --   --   --    ALBUMIN g/dL  --   --   --  3.3*  --  3.3*       Administrative Statements     Portions of the record may have been created with voice recognition software. Occasional wrong word or \"sound a like\" substitutions may have occurred due to the inherent limitations of voice recognition software. Read the chart carefully and recognize, using context, where substitutions have occurred.If you have any questions, please contact the dictating provider.  "

## 2024-12-11 NOTE — PROGRESS NOTES
FYI Progress Note - Hospitalist   Name: Yao Tipton 84 y.o. male I MRN: [id number]  Unit/Bed#: 4 [place] [address]01 I Date of Admission: 12/7/2024   Date of Service: 12/11/2024 I Hospital Day: 3    Assessment & Plan  Acute on chronic combined systolic and diastolic congestive heart failure (HCC)  Wt Readings from Last 3 Encounters:   12/11/24 62.8 kg (138 lb 8 oz)   08/19/24 67.2 kg (148 lb 3.2 oz)   08/08/24 66.7 kg (147 lb)     History of CKD 4 diabetes mellitus BPH atrial fibrillation and combined CHF who presents to the hospital for worsening leg edema  Nephrology and cardiology consulted.  There is history of dietary indiscretion including consumption of salt food  IV diuresis tailored to 40 mg furosemide twice daily  Continue IV diuresis with goal of net -1 L    Intake/Output Summary (Last 24 hours) at 12/11/2024 1141  Last data filed at 12/11/2024 0830  Gross per 24 hour   Intake 08354 ml   Output 1200 ml   Net 67211 ml       Chronic kidney disease, stage 4 (severe) (HCC)  Baseline creatinine closer to 2.0.  Nephrology consulted    Results from last 7 days   Lab Units 12/11/24  0500 12/10/24  0513 12/09/24  0453 12/08/24  0434 12/07/24  1439   BUN mg/dL 56* 57* 60* 65* 63*   CREATININE mg/dL 1.96* 1.95* 1.94* 1.95* 1.97*   EGFR ml/min/1.73sq m 30 30 30 30 29   Renal function appears baseline  Open wound of both lower extremities  Blisters of lower extremities; wound care consulted  Continue localized wound care, does not look overly infected  Diabetes mellitus with peripheral artery disease (HCC)  Lab Results   Component Value Date    HGBA1C 7.7 (H) 12/07/2024     Recent Labs     12/10/24  1607 12/10/24  2034 12/11/24  0713 12/11/24  1133   POCGLU 165* 187* 80 183*     Prior to admission on glimepiride  Hypoglycemia noted with glargine.  Patient was on glargine 5 units nightly  Sliding scale only for now  Monitor glucose log  Chronic atrial fibrillation (HCC)  Chronic atrial fibrillation bradycardia.  Monitor on  telemetry.  Anticoagulation: hold eliquis. Plan to restart in the next 24 hrs     Duodenal ulcer  History of Beck's esophagitis with large duodenal ulcer  Started on famotidine this admission  BPH (benign prostatic hyperplasia)  With history of urinary retention previously  Continue tamsulosin  Consult urology given acute urinary retention noted on 12/9.  Essential hypertension  Patient on torsemide alone at home  Due to bradycardia he was not initiated on beta-blocker  Monitor with IV diuresis  Thrombocytopenia (HCC)  Baseline platelet count 101 - 143 in past year  Platelet count 100 today  Monitor count  Dyslipidemia  Continue statin  Gout  Continue allopurinol  Acute urinary retention  Noted per nursing on 12/9.  S/p Dupont's catheter insertion      -Consult to urology  Gross hematuria  Noted on 12/10 during rounds most probably traumatic secondary to Dupont's catheter insertion  No clots noted    -Hold Eliquis Day2#   -urine clearing up . Plan to restart eliquis in the next 24 hrs  -Consult urology; voiding trial when appropriate per urology recs     VTE Pharmacologic Prophylaxis: VTE Score: 4 Moderate Risk (Score 3-4) - Pharmacological DVT Prophylaxis Contraindicated. Sequential Compression Devices Ordered.    Mobility:   Basic Mobility Inpatient Raw Score: 17  JH-HLM Goal: 5: Stand one or more mins  JH-HLM Achieved: 6: Walk 10 steps or more  JH-HLM Goal achieved. Continue to encourage appropriate mobility.    Patient Centered Rounds: I performed bedside rounds with nursing staff today.   Discussions with Specialists or Other Care Team Provider: cardiology , urology     Education and Discussions with Family / Patient: Patient declined call to .     Current Length of Stay: 3 day(s)  Current Patient Status: Inpatient   Certification Statement: The patient will continue to require additional inpatient hospital stay due to acute CHF , heamturia   Discharge Plan: Anticipate discharge in 24-48 hrs to  home.    Code Status: Level 3 - DNAR and DNI    Subjective   Patient seen today at bedside. Feeling fine. No active complaints.  No chest pain or tightness, SOB or cough, dizziness or light headedness, N/V, Diarrhea of constipation.       Objective :  Temp:  [97.7 °F (36.5 °C)-98.1 °F (36.7 °C)] 97.8 °F (36.6 °C)  HR:  [70-75] 75  BP: (113-150)/(57-75) 118/66  Resp:  [18-19] 18  SpO2:  [95 %-98 %] 97 %  O2 Device: None (Room air)    Body mass index is 21.06 kg/m².     Input and Output Summary (last 24 hours):     Intake/Output Summary (Last 24 hours) at 12/11/2024 1141  Last data filed at 12/11/2024 0830  Gross per 24 hour   Intake 35810 ml   Output 1200 ml   Net 42587 ml       Physical Exam  Vitals and nursing note reviewed.   Constitutional:       General: He is not in acute distress.     Appearance: Normal appearance.   HENT:      Head: Normocephalic and atraumatic.      Mouth/Throat:      Mouth: Mucous membranes are moist.   Eyes:      Conjunctiva/sclera: Conjunctivae normal.      Pupils: Pupils are equal, round, and reactive to light.   Cardiovascular:      Rate and Rhythm: Normal rate and regular rhythm.      Pulses: Normal pulses.           Carotid pulses are 2+ on the right side and 2+ on the left side.       Radial pulses are 2+ on the right side and 2+ on the left side.        Dorsalis pedis pulses are 2+ on the right side and 2+ on the left side.      Heart sounds: Normal heart sounds, S1 normal and S2 normal. No murmur heard.  Pulmonary:      Effort: No tachypnea, bradypnea or accessory muscle usage.      Breath sounds: Normal air entry. Examination of the right-lower field reveals decreased breath sounds. Decreased breath sounds present. No wheezing, rhonchi or rales.   Abdominal:      General: Abdomen is flat. Bowel sounds are normal. There is no distension.      Palpations: Abdomen is soft.      Tenderness: There is no abdominal tenderness.   Musculoskeletal:      Right lower leg: No edema.      Left  lower leg: No edema.   Neurological:      Mental Status: He is alert and oriented to person, place, and time. Mental status is at baseline.   Psychiatric:         Mood and Affect: Mood normal.         Behavior: Behavior normal.           Lines/Drains:  Lines/Drains/Airways       Active Status       Name Placement date Placement time Site Days    Urethral Catheter 16 Fr. 12/09/24  1800  --  1                  Urinary Catheter:  Goal for removal: Voiding trial when ambulation improves                 Lab Results: I have reviewed the following results:   Results from last 7 days   Lab Units 12/11/24  0500 12/10/24  1050   WBC Thousand/uL 4.10* 6.69   HEMOGLOBIN g/dL 9.8* 9.8*   HEMATOCRIT % 30.4* 30.8*   PLATELETS Thousands/uL 84* 85*   SEGS PCT %  --  85*   LYMPHO PCT %  --  9*   MONO PCT %  --  6   EOS PCT %  --  0     Results from last 7 days   Lab Units 12/11/24  0500 12/10/24  0513 12/09/24  0453   SODIUM mmol/L 133*   < > 136   POTASSIUM mmol/L 4.0   < > 4.0   CHLORIDE mmol/L 97   < > 100   CO2 mmol/L 29   < > 28   BUN mg/dL 56*   < > 60*   CREATININE mg/dL 1.96*   < > 1.94*   ANION GAP mmol/L 7   < > 8   CALCIUM mg/dL 7.9*   < > 8.0*   ALBUMIN g/dL  --   --  3.3*   TOTAL BILIRUBIN mg/dL  --   --  0.74   ALK PHOS U/L  --   --  122*   ALT U/L  --   --  22   AST U/L  --   --  22   GLUCOSE RANDOM mg/dL 93   < > 36*    < > = values in this interval not displayed.         Results from last 7 days   Lab Units 12/11/24  1133 12/11/24  0713 12/10/24  2034 12/10/24  1607 12/10/24  1137 12/10/24  0710 12/10/24  0641 12/10/24  0620 12/09/24  2002 12/09/24  1608 12/09/24  1149 12/09/24  0736   POC GLUCOSE mg/dl 183* 80 187* 165* 153* 129 116 30* 148* 86 166* 203*     Results from last 7 days   Lab Units 12/07/24  1439   HEMOGLOBIN A1C % 7.7*           Recent Cultures (last 7 days):         Imaging Results Review: No pertinent imaging studies reviewed.  Other Study Results Review: No additional pertinent studies  reviewed.    Last 24 Hours Medication List:     Current Facility-Administered Medications:     acetaminophen (TYLENOL) tablet 650 mg, Q6H PRN    allopurinol (ZYLOPRIM) tablet 100 mg, BID    ascorbic acid (VITAMIN C) tablet 500 mg, Daily    atorvastatin (LIPITOR) tablet 40 mg, Daily With Dinner    Cholecalciferol (VITAMIN D3) tablet 2,000 Units, Daily    docusate sodium (COLACE) capsule 100 mg, BID    famotidine (PEPCID) tablet 10 mg, HS    ferrous sulfate tablet 325 mg, Daily With Breakfast    furosemide (LASIX) injection 40 mg, BID (diuretic)    insulin lispro (HumALOG/ADMELOG) 100 units/mL subcutaneous injection 1-5 Units, TID AC **AND** Fingerstick Glucose (POCT), TID AC    insulin lispro (HumALOG/ADMELOG) 100 units/mL subcutaneous injection 1-5 Units, HS    latanoprost (XALATAN) 0.005 % ophthalmic solution 1 drop, HS    nystatin (MYCOSTATIN) powder, BID    polyethylene glycol (MIRALAX) packet 17 g, Daily    senna (SENOKOT) tablet 17.2 mg, HS    sodium chloride (OCEAN) 0.65 % nasal spray 1 spray, Q1H PRN    tamsulosin (FLOMAX) capsule 0.4 mg, Daily With Dinner    timolol (TIMOPTIC) 0.5 % ophthalmic solution 1 drop, Daily    zinc sulfate (ZINCATE) capsule 220 mg, Daily    Administrative Statements   Today, Patient Was Seen By: Yen Jay MD  I have spent a total time of 30 minutes in caring for this patient on the day of the visit/encounter including Diagnostic results, Prognosis, Risks and benefits of tx options, and Instructions for management.    **Please Note: This note may have been constructed using a voice recognition system.**

## 2024-12-11 NOTE — PROGRESS NOTES
"Progress Note - Urology      Patient: Yao Tipton   : 1940 Sex: male   MRN: [id number]     CSN: [id number]  Unit/Bed#: [address] [place] [address]     SUBJECTIVE:   Patient seen on evening rounds  No issues with indwelling Dupont catheter  Diuresing nicely  Diabetes not under control      Objective   Vitals: /63 (BP Location: Right arm)   Pulse 64   Temp (!) 97.2 °F (36.2 °C) (Oral)   Resp 17   Ht 5' 8\" (1.727 m)   Wt 62.8 kg (138 lb 8 oz)   SpO2 95%   BMI 21.06 kg/m²     I/O last 24 hours:  In: 1020 [P.O.:1020]  Out: 2650 [Urine:2650]      Physical Exam:   General Alert awake   Normocephalic atraumatic PERRLA  Lungs clear bilaterally  Cardiac normal S1 normal S2  Abdomen soft, flank pain  Extremities no edema      Lab Results: CBC:   Lab Results   Component Value Date    WBC 4.10 (L) 2024    HGB 9.8 (L) 2024    HCT 30.4 (L) 2024    MCV 98 2024    PLT 84 (L) 2024    RBC 3.09 (L) 2024    MCH 31.7 2024    MCHC 32.2 2024    RDW 16.5 (H) 2024    MPV 10.6 2024    NRBC 0 12/10/2024     CMP:   Lab Results   Component Value Date    CL 97 2024    CO2 29 2024    BUN 56 (H) 2024    CREATININE 1.96 (H) 2024    CALCIUM 7.9 (L) 2024    AST 22 2024    ALT 22 2024    ALKPHOS 122 (H) 2024    EGFR 30 2024     Urinalysis:   Lab Results   Component Value Date    COLORU Red 12/10/2024    CLARITYU Clear 12/10/2024    SPECGRAV 1.020 12/10/2024    PHUR 6.0 12/10/2024    PHUR 5.0 2019    PHUR 5.5 01/10/2019    LEUKOCYTESUR Large (A) 12/10/2024    NITRITE Negative 12/10/2024    GLUCOSEU Negative 12/10/2024    KETONESU Negative 12/10/2024    BILIRUBINUR Negative 12/10/2024    BLOODU Large (A) 12/10/2024     Urine Culture:   Lab Results   Component Value Date    URINECX >100,000 cfu/ml Proteus species (A) 12/10/2024     PSA: No results found for: \"PSA\"      Assessment/ Plan:  Urinary retention  Acute on chronic " congestive heart failure  Renal failure  Continue Dupont catheter voiding        Cliff Erazo MD

## 2024-12-11 NOTE — ASSESSMENT & PLAN NOTE
-Noted weight increase from April  - CT chest small R pl effusion, mod L pl effusion, atelectasis  - To note has also high salt intake during the holidays  - Current creatinine is below baseline  - Lasix was increased 12/8 and patient is tolerating IV Lasix well.  Changed to oral torsemide on 12/11  - See discussion above

## 2024-12-11 NOTE — ASSESSMENT & PLAN NOTE
Wt Readings from Last 3 Encounters:   12/11/24 62.8 kg (138 lb 8 oz)   08/19/24 67.2 kg (148 lb 3.2 oz)   08/08/24 66.7 kg (147 lb)     History of CKD 4 diabetes mellitus BPH atrial fibrillation and combined CHF who presents to the hospital for worsening leg edema  Nephrology and cardiology consulted.  There is history of dietary indiscretion including consumption of salt food  IV diuresis tailored to 40 mg furosemide twice daily  Continue IV diuresis with goal of net -1 L    Intake/Output Summary (Last 24 hours) at 12/11/2024 1141  Last data filed at 12/11/2024 0830  Gross per 24 hour   Intake 81279 ml   Output 1200 ml   Net 51832 ml

## 2024-12-11 NOTE — PLAN OF CARE
Problem: PAIN - ADULT  Goal: Verbalizes/displays adequate comfort level or baseline comfort level  Description: Interventions:  - Encourage patient to monitor pain and request assistance  - Assess pain using appropriate pain scale  - Administer analgesics based on type and severity of pain and evaluate response  - Implement non-pharmacological measures as appropriate and evaluate response  - Consider cultural and social influences on pain and pain management  - Notify physician/advanced practitioner if interventions unsuccessful or patient reports new pain  Outcome: Progressing     Problem: INFECTION - ADULT  Goal: Absence or prevention of progression during hospitalization  Description: INTERVENTIONS:  - Assess and monitor for signs and symptoms of infection  - Monitor lab/diagnostic results  - Monitor all insertion sites, i.e. indwelling lines, tubes, and drains  - Monitor endotracheal if appropriate and nasal secretions for changes in amount and color  - [place] appropriate cooling/warming therapies per order  - Administer medications as ordered  - Instruct and encourage patient and family to use good hand hygiene technique  - Identify and instruct in appropriate isolation precautions for identified infection/condition  Outcome: Progressing  Goal: Absence of fever/infection during neutropenic period  Description: INTERVENTIONS:  - Monitor WBC    Outcome: Progressing     Problem: SAFETY ADULT  Goal: Patient will remain free of falls  Description: INTERVENTIONS:  - Educate patient/family on patient safety including physical limitations  - Instruct patient to call for assistance with activity   - Consult OT/PT to assist with strengthening/mobility   - Keep Call bell within reach  - Keep bed low and locked with side rails adjusted as appropriate  - Keep care items and personal belongings within reach  - Initiate and maintain comfort rounds  - Make Fall Risk Sign visible to staff  - Apply yellow socks and bracelet  for high fall risk patients  - Consider moving patient to room near nurses station  Outcome: Progressing  Goal: Maintain or return to baseline ADL function  Description: INTERVENTIONS:  -  Assess patient's ability to carry out ADLs; assess patient's baseline for ADL function and identify physical deficits which impact ability to perform ADLs (bathing, care of mouth/teeth, toileting, grooming, dressing, etc.)  - Assess/evaluate cause of self-care deficits   - Assess range of motion  - Assess patient's mobility; develop plan if impaired  - Assess patient's need for assistive devices and provide as appropriate  - Encourage maximum independence but intervene and supervise when necessary  - Involve family in performance of ADLs  - Assess for home care needs following discharge   - Consider OT consult to assist with ADL evaluation and planning for discharge  - Provide patient education as appropriate  Outcome: Progressing  Goal: Maintains/Returns to pre admission functional level  Description: INTERVENTIONS:  - Perform AM-PAC 6 Click Basic Mobility/ Daily Activity assessment daily.  - Set and communicate daily mobility goal to care team and patient/family/caregiver.   - Collaborate with rehabilitation services on mobility goals if consulted  - Out of bed for toileting  - Record patient progress and toleration of activity level   Outcome: Progressing     Problem: DISCHARGE PLANNING  Goal: Discharge to home or other facility with appropriate resources  Description: INTERVENTIONS:  - Identify barriers to discharge w/patient and caregiver  - Arrange for needed discharge resources and transportation as appropriate  - Identify discharge learning needs (meds, wound care, etc.)  - Arrange for interpretive services to assist at discharge as needed  - Refer to Case Management Department for coordinating discharge planning if the patient needs post-hospital services based on physician/advanced practitioner order or complex needs  related to functional status, cognitive ability, or social support system  Outcome: Progressing     Problem: Knowledge Deficit  Goal: Patient/family/caregiver demonstrates understanding of disease process, treatment plan, medications, and discharge instructions  Description: Complete learning assessment and assess knowledge base.  Interventions:  - Provide teaching at level of understanding  - Provide teaching via preferred learning methods  Outcome: Progressing     Problem: Decreased Cardiac Output  Goal: Cardiac output adequate for individual needs  Description: INTERVENTIONS: Monitor for signs and symptoms of decreased cardiac output   - Monitor for dyspnea with exertion and at rest  - Monitor for orthopnea  - Monitor for signs of tachycardia. Place patient on telemetry monitoring.  - Assess patient for jugular vein distention  - Assess patient for lower extremity edema and poor peripheral perfusion   - Auscultate lung sound for Fine bibasilar crackles   - Monitor for cardiac arrythmias   - Administer beta blockers, antiarrhythmic, and blood pressure medications as ordered    Outcome: Progressing     Problem: Impaired Gas Exchange  Goal: Optimize oxygenation and ensure adequate ventilation  Description: INTERVENTIONS: Monitor for signs and symptoms of respiratory distress                - Elevate HOB or use high fowlers to promote lung expansion                - Administer oxygen as ordered to maintain adequate oxygenation                - Encourage use of IS to promote lung expansion and prevent PN                - Monitor ABGs to assess oxygenation status                - Monitor blood oxygen level to maintain adequate oxygenation                - Encourage cough and deep breathing exercises to promote lung expansion                - Monitor patient's mental status for increased confusion    Outcome: Progressing     Problem: Excess Fluid Volume  Goal: Patient is able to achieve and maintain  homeostasis  Description: INTERVENTIONS: Monitor for sign and symptoms of fluid overload  - Evaluate LE edema every shift  - Elevate LE to prevent dependent edema  - Apply SHIV stockings as ordered   - Monitor ankle circumference daily  - Assess for jugular vein distention  - Evaluate provider orders for the CHF diuretic algorithm. Administer diuretics as ordered  - Weigh the patient daily at 0600 and report a weight gain of five pounds or more   - Strict intake and output  - Monitor fluid intake and adhere to fluid restrictions  - Assess lung sounds every shift and as needed  - Monitor vital signs and lab values (CBC, chem, BUN, BNP)  - Measure and document urine output    Outcome: Progressing     Problem: Knowledge Deficit  Goal: Patient is able to verbalize understanding of Heart Failure after education  Description: INTERVENTIONS:  - Educate the patient and family on signs and symptoms of HF  - Provide the patient with HF education and HF zone tool  - Educate on the importance of daily weight in the AM and reporting a weight gain               of 3 or more pounds to their primary care physician  - Monitor for SOB  - Maintain and sodium and fluid restriction  - Educate the patient on the importance of medications such as: diuretics, betablockers,               antiarrhythmics and their purpose, dose, route, side effects and labs               if they are needed    Outcome: Progressing     Problem: Activity Intolerance  Goal: Patient is able to perform activities within their limitations  Description: INTERVENTIONS:                       -   Alternate periods of activity with periods of rest                 -   Patients is able to maintain normal vitals heart rhythm during activity                 -   Gradually increase activity and exercise as patient can tolerate                 -   Monitor blood pressure and heart before and after exercise                  -   Monitor blood oxygen saturation during activity and  apply oxygen as needed    Outcome: Progressing     Problem: Prexisting or High Potential for Compromised Skin Integrity  Goal: Skin integrity is maintained or improved  Description: INTERVENTIONS:  - Identify patients at risk for skin breakdown  - Assess and monitor skin integrity  - Assess and monitor nutrition and hydration status  - Monitor labs   - Assess for incontinence   - Turn and reposition patient  - Assist with mobility/ambulation  - Relieve pressure over bony prominences  - Avoid friction and shearing  - Provide appropriate hygiene as needed including keeping skin clean and dry  - Evaluate need for skin moisturizer/barrier cream  - Collaborate with interdisciplinary team   - Patient/family teaching  - Consider wound care consult   Outcome: Progressing     Problem: Nutrition/Hydration-ADULT  Goal: Nutrient/Hydration intake appropriate for improving, restoring or maintaining nutritional needs  Description: Monitor and assess patient's nutrition/hydration status for malnutrition. Collaborate with interdisciplinary team and initiate plan and interventions as ordered.  Monitor patient's weight and dietary intake as ordered or per policy. Utilize nutrition screening tool and intervene as necessary. Determine patient's food preferences and provide high-protein, high-caloric foods as appropriate.     INTERVENTIONS:  - Monitor oral intake, urinary output, labs, and treatment plans  - Assess nutrition and hydration status and recommend course of action  - Evaluate amount of meals eaten  - Assist patient with eating if necessary   - Allow adequate time for meals  - Recommend/ encourage appropriate diets, oral nutritional supplements, and vitamin/mineral supplements  - Order, calculate, and assess calorie counts as needed  - Recommend, monitor, and adjust tube feedings and TPN/PPN based on assessed needs  - Assess need for intravenous fluids  - Provide specific nutrition/hydration education as appropriate  - Include  patient/family/caregiver in decisions related to nutrition  Outcome: Progressing

## 2024-12-11 NOTE — ASSESSMENT & PLAN NOTE
-Outpatient nephrologist Dr. 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  Lasix was increased to IV 40 mg twice daily on 12/8  - 12/9-creatinine stable 1.9 mg/dL.  Had adequate urine output with increased Lasix yesterday.  Patient still with pulmonary edema on exam. Na, K, bicarb appropriate. Hypoglycemia improved with management by primary team  - 12/10-creatinine stable 1.9.  Sodium, potassium, bicarbonate, phosphorus and magnesium stable and appropriate.  Chest x-ray with persistent pulmonary edema and pleural effusions and persistent bibasilar airspace opacity  - 12/11 -creatinine stable 1.9.  Sodium borderline low 133 will monitor for now.  Potassium is okay.  Will change IV Lasix to torsemide    Plan  - Patient is volume state today overall is improved.  We diuresed the patient further yesterday with IV diuretics and chest x-ray to confirm pulmonary edema.  On clinical exam 12/11, weight is improving, urine output is improving.  - Change patient to oral torsemide.  Was on 60 mg once a day at home, changed to 40 mg twice daily starting tonight  - BMP in a.m.  - I/os; avoid nephrotoxic agents  - Avoid hypotension  - Lower extremity PVD-currently asymptomatic but does have wounds on lower extremities.  Further management per primary team.  No DVT.

## 2024-12-11 NOTE — ASSESSMENT & PLAN NOTE
Chronic atrial fibrillation bradycardia.  Monitor on telemetry.  Anticoagulation: hold eliquis. Plan to restart in the next 24 hrs

## 2024-12-11 NOTE — PLAN OF CARE
Problem: PAIN - ADULT  Goal: Verbalizes/displays adequate comfort level or baseline comfort level  Description: Interventions:  - Encourage patient to monitor pain and request assistance  - Assess pain using appropriate pain scale  - Administer analgesics based on type and severity of pain and evaluate response  - Implement non-pharmacological measures as appropriate and evaluate response  - Consider cultural and social influences on pain and pain management  - Notify physician/advanced practitioner if interventions unsuccessful or patient reports new pain  Outcome: Progressing     Problem: INFECTION - ADULT  Goal: Absence or prevention of progression during hospitalization  Description: INTERVENTIONS:  - Assess and monitor for signs and symptoms of infection  - Monitor lab/diagnostic results  - Monitor all insertion sites, i.e. indwelling lines, tubes, and drains  - Monitor endotracheal if appropriate and nasal secretions for changes in amount and color  - [place] appropriate cooling/warming therapies per order  - Administer medications as ordered  - Instruct and encourage patient and family to use good hand hygiene technique  - Identify and instruct in appropriate isolation precautions for identified infection/condition  Outcome: Progressing  Goal: Absence of fever/infection during neutropenic period  Description: INTERVENTIONS:  - Monitor WBC    Outcome: Progressing     Problem: SAFETY ADULT  Goal: Patient will remain free of falls  Description: INTERVENTIONS:  - Educate patient/family on patient safety including physical limitations  - Instruct patient to call for assistance with activity   - Consult OT/PT to assist with strengthening/mobility   - Keep Call bell within reach  - Keep bed low and locked with side rails adjusted as appropriate  - Keep care items and personal belongings within reach  - Initiate and maintain comfort rounds  - Make Fall Risk Sign visible to staff  - Offer Toileting every 2 Hours,  in advance of need  - Initiate/Maintain bed/chair alarm  - Obtain necessary fall risk management equipment: bracelet/socks   - Apply yellow socks and bracelet for high fall risk patients  - Consider moving patient to room near nurses station  Outcome: Progressing  Goal: Maintain or return to baseline ADL function  Description: INTERVENTIONS:  -  Assess patient's ability to carry out ADLs; assess patient's baseline for ADL function and identify physical deficits which impact ability to perform ADLs (bathing, care of mouth/teeth, toileting, grooming, dressing, etc.)  - Assess/evaluate cause of self-care deficits   - Assess range of motion  - Assess patient's mobility; develop plan if impaired  - Assess patient's need for assistive devices and provide as appropriate  - Encourage maximum independence but intervene and supervise when necessary  - Involve family in performance of ADLs  - Assess for home care needs following discharge   - Consider OT consult to assist with ADL evaluation and planning for discharge  - Provide patient education as appropriate  Outcome: Progressing  Goal: Maintains/Returns to pre admission functional level  Description: INTERVENTIONS:  - Perform AM-PAC 6 Click Basic Mobility/ Daily Activity assessment daily.  - Set and communicate daily mobility goal to care team and patient/family/caregiver.   - Collaborate with rehabilitation services on mobility goals if consulted  - Perform Range of Motion 12 times a day.  - Reposition patient every 2 hours.  - Dangle patient 3 times a day  - Stand patient 3 times a day  - Ambulate patient 3 times a day  - Out of bed to chair 3 times a day   - Out of bed for meals 3 times a day  - Out of bed for toileting  - Record patient progress and toleration of activity level   Outcome: Progressing     Problem: DISCHARGE PLANNING  Goal: Discharge to home or other facility with appropriate resources  Description: INTERVENTIONS:  - Identify barriers to discharge  w/patient and caregiver  - Arrange for needed discharge resources and transportation as appropriate  - Identify discharge learning needs (meds, wound care, etc.)  - Arrange for interpretive services to assist at discharge as needed  - Refer to Case Management Department for coordinating discharge planning if the patient needs post-hospital services based on physician/advanced practitioner order or complex needs related to functional status, cognitive ability, or social support system  Outcome: Progressing     Problem: Knowledge Deficit  Goal: Patient/family/caregiver demonstrates understanding of disease process, treatment plan, medications, and discharge instructions  Description: Complete learning assessment and assess knowledge base.  Interventions:  - Provide teaching at level of understanding  - Provide teaching via preferred learning methods  Outcome: Progressing     Problem: Decreased Cardiac Output  Goal: Cardiac output adequate for individual needs  Description: INTERVENTIONS: Monitor for signs and symptoms of decreased cardiac output   - Monitor for dyspnea with exertion and at rest  - Monitor for orthopnea  - Monitor for signs of tachycardia. Place patient on telemetry monitoring.  - Assess patient for jugular vein distention  - Assess patient for lower extremity edema and poor peripheral perfusion   - Auscultate lung sound for Fine bibasilar crackles   - Monitor for cardiac arrythmias   - Administer beta blockers, antiarrhythmic, and blood pressure medications as ordered    Outcome: Progressing     Problem: Impaired Gas Exchange  Goal: Optimize oxygenation and ensure adequate ventilation  Description: INTERVENTIONS: Monitor for signs and symptoms of respiratory distress                - Elevate HOB or use high fowlers to promote lung expansion                - Administer oxygen as ordered to maintain adequate oxygenation                - Encourage use of IS to promote lung expansion and prevent PN                 - Monitor ABGs to assess oxygenation status                - Monitor blood oxygen level to maintain adequate oxygenation                - Encourage cough and deep breathing exercises to promote lung expansion                - Monitor patient's mental status for increased confusion    Outcome: Progressing     Problem: Excess Fluid Volume  Goal: Patient is able to achieve and maintain homeostasis  Description: INTERVENTIONS: Monitor for sign and symptoms of fluid overload  - Evaluate LE edema every shift  - Elevate LE to prevent dependent edema  - Apply SHIV stockings as ordered   - Monitor ankle circumference daily  - Assess for jugular vein distention  - Evaluate provider orders for the CHF diuretic algorithm. Administer diuretics as ordered  - Weigh the patient daily at 0600 and report a weight gain of five pounds or more   - Strict intake and output  - Monitor fluid intake and adhere to fluid restrictions  - Assess lung sounds every shift and as needed  - Monitor vital signs and lab values (CBC, chem, BUN, BNP)  - Measure and document urine output    Outcome: Progressing     Problem: Activity Intolerance  Goal: Patient is able to perform activities within their limitations  Description: INTERVENTIONS:                       -   Alternate periods of activity with periods of rest                 -   Patients is able to maintain normal vitals heart rhythm during activity                 -   Gradually increase activity and exercise as patient can tolerate                 -   Monitor blood pressure and heart before and after exercise                  -   Monitor blood oxygen saturation during activity and apply oxygen as needed    Outcome: Progressing     Problem: Knowledge Deficit  Goal: Patient is able to verbalize understanding of Heart Failure after education  Description: INTERVENTIONS:  - Educate the patient and family on signs and symptoms of HF  - Provide the patient with HF education and HF zone  tool  - Educate on the importance of daily weight in the AM and reporting a weight gain               of 3 or more pounds to their primary care physician  - Monitor for SOB  - Maintain and sodium and fluid restriction  - Educate the patient on the importance of medications such as: diuretics, betablockers,               antiarrhythmics and their purpose, dose, route, side effects and labs               if they are needed    Outcome: Progressing     Problem: Prexisting or High Potential for Compromised Skin Integrity  Goal: Skin integrity is maintained or improved  Description: INTERVENTIONS:  - Identify patients at risk for skin breakdown  - Assess and monitor skin integrity  - Assess and monitor nutrition and hydration status  - Monitor labs   - Assess for incontinence   - Turn and reposition patient  - Assist with mobility/ambulation  - Relieve pressure over bony prominences  - Avoid friction and shearing  - Provide appropriate hygiene as needed including keeping skin clean and dry  - Evaluate need for skin moisturizer/barrier cream  - Collaborate with interdisciplinary team   - Patient/family teaching  - Consider wound care consult   Outcome: Progressing     Problem: Nutrition/Hydration-ADULT  Goal: Nutrient/Hydration intake appropriate for improving, restoring or maintaining nutritional needs  Description: Monitor and assess patient's nutrition/hydration status for malnutrition. Collaborate with interdisciplinary team and initiate plan and interventions as ordered.  Monitor patient's weight and dietary intake as ordered or per policy. Utilize nutrition screening tool and intervene as necessary. Determine patient's food preferences and provide high-protein, high-caloric foods as appropriate.     INTERVENTIONS:  - Monitor oral intake, urinary output, labs, and treatment plans  - Assess nutrition and hydration status and recommend course of action  - Evaluate amount of meals eaten  - Assist patient with eating if  necessary   - Allow adequate time for meals  - Recommend/ encourage appropriate diets, oral nutritional supplements, and vitamin/mineral supplements  - Order, calculate, and assess calorie counts as needed  - Recommend, monitor, and adjust tube feedings and TPN/PPN based on assessed needs  - Assess need for intravenous fluids  - Provide specific nutrition/hydration education as appropriate  - Include patient/family/caregiver in decisions related to nutrition  Outcome: Progressing

## 2024-12-11 NOTE — ASSESSMENT & PLAN NOTE
Noted on 12/10 during rounds most probably traumatic secondary to Dupont's catheter insertion  No clots noted    -Hold Eliquis Day2#   -urine clearing up . Plan to restart eliquis in the next 24 hrs  -Consult urology; voiding trial when appropriate per urology recs

## 2024-12-11 NOTE — ASSESSMENT & PLAN NOTE
Lab Results   Component Value Date    HGBA1C 7.7 (H) 12/07/2024     Recent Labs     12/10/24  1607 12/10/24  2034 12/11/24  0713 12/11/24  1133   POCGLU 165* 187* 80 183*     Prior to admission on glimepiride  Hypoglycemia noted with glargine.  Patient was on glargine 5 units nightly  Sliding scale only for now  Monitor glucose log

## 2024-12-11 NOTE — OCCUPATIONAL THERAPY NOTE
Occupational Therapy Evaluation/Treatment       12/11/24 1002   OT Last Visit   OT Visit Date 12/11/24   Note Type   Note type Evaluation   Pain Assessment   Pain Assessment Tool 0-10   Pain Score No Pain   Restrictions/Precautions   Other Precautions Chair Alarm;Bed Alarm;Fall Risk   Home Living   Type of Home House   Home Layout One level  (2 MOHINDER)   Bathroom Shower/Tub Walk-in shower   Bathroom Toilet Standard   Bathroom Equipment Grab bars in shower;Shower chair;Grab bars around toilet   Home Equipment Walker;Wheelchair-manual   Prior Function   Level of [place] Needs assistance with ADLs;Needs assistance with functional mobility;Needs assistance with IADLS   Lives With Son   Receives Help From Family;Home health   IADLs Family/Friend/Other provides transportation;Family/Friend/Other provides meals;Family/Friend/Other provides medication management   Comments Patient states having his sons assist at times for dressing and bathing as well as wound care also having a visiting nurse assist   Lifestyle   Reciprocal Relationships lives with son, supportive family   Intrinsic Gratification fishing and family   ADL   Eating Assistance 5  Supervision/Setup   Grooming Assistance 5  Supervision/Setup   UB Bathing Assistance 4  Minimal Assistance   LB Bathing Assistance 4  Minimal Assistance   UB Dressing Assistance 4  Minimal Assistance   LB Dressing Assistance 4  Minimal Assistance   Toileting Assistance  4  Minimal Assistance   Bed Mobility   Supine to Sit 4  Minimal assistance   Transfers   Sit to Stand 4  Minimal assistance   Stand to Sit 4  Minimal assistance   Functional Mobility   Functional Mobility 4  Minimal assistance   Additional Comments short household distance with RW   Balance   Static Sitting Fair   Dynamic Sitting Fair   Static Standing Fair -   Dynamic Standing Fair -   Activity Tolerance   Activity Tolerance Patient limited by fatigue   Nurse Made Aware yes Lacy MCNAMARA  Assessment WFL   LUE Assessment   LUE Assessment WFL   Cognition   Overall Cognitive Status WFL   Arousal/Participation Cooperative   Attention Attends with cues to redirect   Orientation Level Oriented to person;Oriented to place   Following Commands Follows one step commands with increased time or repetition   Assessment   Limitation Decreased ADL status;Decreased UE strength;Decreased Safe judgement during ADL;Decreased endurance;Decreased high-level ADLs;Decreased self-care trans  (decreased balance and mobility)   Prognosis Good   Assessment Patient evaluated by Occupational Therapy.  Patient admitted with Acute on chronic combined systolic and diastolic congestive heart failure (HCC).  The patients occupational profile, medical and therapy history includes a extensive additional review of physical, cognitive, or psychosocial history related to current functional performance.  Comorbidities affecting functional mobility and ADLS include: Afib, CAD, CHF, diabetes, and hypertension.  Prior to admission, patient was requiring assist for functional mobility with walker, requiring assist for ADLS, and requiring assist for IADLS.  The evaluation identifies the following performance deficits: weakness, impaired balance, decreased endurance, increased fall risk, new onset of impairment of functional mobility, decreased ADLS, decreased IADLS, decreased activity tolerance, decreased safety awareness, impaired judgement, and decreased strength, that result in activity limitations and/or participation restrictions. This evaluation requires clinical decision making of high complexity, because the patient presents with comorbidites that affect occupational performance and required significant modification of tasks or assistance with consideration of multiple treatment options.  The Barthel Index was used as a functional outcome tool presenting with a score of Barthel Index Score: 45, indicating marked limitations of  functional mobility and ADLS.  The patient's raw score on the -PAC Daily Activity Inpatient Short Form is 19. A raw score of greater than or equal to 19 suggests the patient may benefit from discharge to home. Please refer to the recommendation of the Occupational Therapist for safe discharge planning.  Patient will benefit from skilled Occupational Therapy services to address above deficits and facilitate a safe return to prior level of function.   Goals   Patient Goals to go home   STG Time Frame   (1-7 days)   Short Term Goal  Goals established to promote Patient Goals: to go home:  Eating: independent; Grooming: supervision standing at sink; Bathing: supervision; Upper Body Dressing supervision; Lower Body Dressing: supervision; Toileting: supervision; Patient will increase ambulatory standard toilet transfer to supervision with rolling walker to increase performance and safety with ADLS and functional mobility; Patient will increase standing tolerance to 4 minutes during ADL task to decrease assistance level and decrease fall risk; Patient will increase bed mobility to supervision in preparation for ADLS and transfers; Patient will increase functional mobility to and from bathroom with rolling walker with supervision to increase performance with ADLS and to use a toilet; Patient will tolerate 5 minutes of UE ROM/strengthening to increase general activity tolerance and performance in ADLS/IADLS; Patient will improve functional activity tolerance to 10 minutes of sustained functional tasks to increase participation in basic self-care and decrease assistance level;   Patient will increase dynamic sitting balance to fair+ to improve the ability to sit at edge of bed or on a chair for ADLS;  Patient will increase dynamic standing balance to fair to improve postural stability and decrease fall risk during standing ADLS and transfers.   LTG Time Frame   (8-14 days)   Long Term Goal Grooming: independent standing at  sink; Bathing: independent; Upper Body Dressing independent; Lower Body Dressing: independent; Toileting: independent; Patient will increase ambulatory standard toilet transfer to independent with rolling walker to increase performance and safety with ADLS and functional mobility; Patient will increase standing tolerance to 8 minutes during ADL task to decrease assistance level and decrease fall risk; Patient will increase bed mobility to independent in preparation for ADLS and transfers; Patient will increase functional mobility to and from bathroom with rolling walker independently to increase performance with ADLS and to use a toilet; Patient will tolerate 10 minutes of UE ROM/strengthening to increase general activity tolerance and performance in ADLS/IADLS; Patient will improve functional activity tolerance to 20 minutes of sustained functional tasks to increase participation in basic self-care and decrease assistance level;   Patient will increase dynamic sitting balance to good to improve the ability to sit at edge of bed or on a chair for ADLS;  Patient will increase dynamic standing balance to fair+ to improve postural stability and decrease fall risk during standing ADLS and transfers.  Pt will score >/= 23/24 on AM-PAC Daily Activity Inpatient scale to promote safe independence with ADLs and functional mobility; Pt will score >/= 75/100 on Barthel Index in order to decrease caregiver assistance needed and increase ability to perform ADLs and functional mobility.   Plan   Treatment Interventions ADL retraining;Functional transfer training;UE strengthening/ROM;Endurance training;Patient/family training;Equipment evaluation/education;Activityengagement;Compensatory technique education   Goal Expiration Date 12/25/24   OT Frequency 3-5x/wk   Discharge Recommendation   Rehab Resource Intensity Level, OT III (Minimum Resource Intensity)   AM-PAC Daily Activity Inpatient   Lower Body Dressing 3   Bathing 3    Toileting 3   Upper Body Dressing 3   Grooming 3   Eating 4   Daily Activity Raw Score 19   Daily Activity Standardized Score (Calc for Raw Score >=11) 40.22   AM-PAC Applied Cognition Inpatient   Following a Speech/Presentation 3   Understanding Ordinary Conversation 4   Taking Medications 3   Remembering Where Things Are Placed or Put Away 3   Remembering List of 4-5 Errands 3   Taking Care of Complicated Tasks 2   Applied Cognition Raw Score 18   Applied Cognition Standardized Score 38.07   Barthel Index   Feeding 10   Bathing 0   Grooming Score 0   Dressing Score 5   Bladder Score 0   Bowels Score 10   Toilet Use Score 5   Transfers (Bed/Chair) Score 10   Mobility (Level Surface) Score 0   Stairs Score 5   Barthel Index Score 45   Additional Treatment Session   Start Time 0952   End Time 1002   Treatment Assessment S: denies pain O :Completed commode transfer with min assist.  Hygiene for large BM mod assist.  Functional mobility with min assist with RW short household distance with RW.  stand to sit min assist.  A: Patient is generally weak and deconditioned.  Has assist from family at home.  Patient will benefit from continued OT services to maximize functional performance with ADLS.  P: III-minimal resource intensity   Licensure   [place] License Number  Mala Mejia MS OTR/L [id number]

## 2024-12-11 NOTE — ASSESSMENT & PLAN NOTE
Baseline creatinine closer to 2.0.  Nephrology consulted    Results from last 7 days   Lab Units 12/11/24  0500 12/10/24  0513 12/09/24  0453 12/08/24  0434 12/07/24  1439   BUN mg/dL 56* 57* 60* 65* 63*   CREATININE mg/dL 1.96* 1.95* 1.94* 1.95* 1.97*   EGFR ml/min/1.73sq m 30 30 30 30 29   Renal function appears baseline

## 2024-12-12 PROBLEM — D53.9 MACROCYTIC ANEMIA: Status: ACTIVE | Noted: 2024-12-12

## 2024-12-12 LAB
ANION GAP SERPL CALCULATED.3IONS-SCNC: 6 MMOL/L (ref 4–13)
BACTERIA UR CULT: ABNORMAL
BUN SERPL-MCNC: 55 MG/DL (ref 5–25)
CALCIUM SERPL-MCNC: 8.3 MG/DL (ref 8.4–10.2)
CHLORIDE SERPL-SCNC: 97 MMOL/L (ref 96–108)
CO2 SERPL-SCNC: 28 MMOL/L (ref 21–32)
CREAT SERPL-MCNC: 2.01 MG/DL (ref 0.6–1.3)
ERYTHROCYTE [DISTWIDTH] IN BLOOD BY AUTOMATED COUNT: 16.9 % (ref 11.6–15.1)
FOLATE SERPL-MCNC: 18.1 NG/ML
GFR SERPL CREATININE-BSD FRML MDRD: 29 ML/MIN/1.73SQ M
GLUCOSE SERPL-MCNC: 175 MG/DL (ref 65–140)
GLUCOSE SERPL-MCNC: 247 MG/DL (ref 65–140)
GLUCOSE SERPL-MCNC: 255 MG/DL (ref 65–140)
GLUCOSE SERPL-MCNC: 294 MG/DL (ref 65–140)
GLUCOSE SERPL-MCNC: 53 MG/DL (ref 65–140)
GLUCOSE SERPL-MCNC: 73 MG/DL (ref 65–140)
HCT VFR BLD AUTO: 32.7 % (ref 36.5–49.3)
HGB BLD-MCNC: 10.2 G/DL (ref 12–17)
MAGNESIUM SERPL-MCNC: 2.2 MG/DL (ref 1.9–2.7)
MCH RBC QN AUTO: 32.5 PG (ref 26.8–34.3)
MCHC RBC AUTO-ENTMCNC: 31.2 G/DL (ref 31.4–37.4)
MCV RBC AUTO: 104 FL (ref 82–98)
PHOSPHATE SERPL-MCNC: 3 MG/DL (ref 2.3–4.1)
PLATELET # BLD AUTO: 81 THOUSANDS/UL (ref 149–390)
PMV BLD AUTO: 10.2 FL (ref 8.9–12.7)
POTASSIUM SERPL-SCNC: 4.6 MMOL/L (ref 3.5–5.3)
RBC # BLD AUTO: 3.14 MILLION/UL (ref 3.88–5.62)
SODIUM SERPL-SCNC: 131 MMOL/L (ref 135–147)
VIT B12 SERPL-MCNC: 574 PG/ML (ref 180–914)
WBC # BLD AUTO: 6.17 THOUSAND/UL (ref 4.31–10.16)

## 2024-12-12 PROCEDURE — 83735 ASSAY OF MAGNESIUM: CPT | Performed by: INTERNAL MEDICINE

## 2024-12-12 PROCEDURE — 82607 VITAMIN B-12: CPT

## 2024-12-12 PROCEDURE — 82746 ASSAY OF FOLIC ACID SERUM: CPT

## 2024-12-12 PROCEDURE — 84100 ASSAY OF PHOSPHORUS: CPT | Performed by: INTERNAL MEDICINE

## 2024-12-12 PROCEDURE — 85027 COMPLETE CBC AUTOMATED: CPT | Performed by: INTERNAL MEDICINE

## 2024-12-12 PROCEDURE — 82948 REAGENT STRIP/BLOOD GLUCOSE: CPT

## 2024-12-12 PROCEDURE — 99232 SBSQ HOSP IP/OBS MODERATE 35: CPT

## 2024-12-12 PROCEDURE — 80048 BASIC METABOLIC PNL TOTAL CA: CPT | Performed by: INTERNAL MEDICINE

## 2024-12-12 PROCEDURE — 99232 SBSQ HOSP IP/OBS MODERATE 35: CPT | Performed by: INTERNAL MEDICINE

## 2024-12-12 RX ORDER — INSULIN GLARGINE 100 [IU]/ML
5 INJECTION, SOLUTION SUBCUTANEOUS EVERY MORNING
Status: DISCONTINUED | OUTPATIENT
Start: 2024-12-12 | End: 2024-12-12

## 2024-12-12 RX ORDER — INSULIN GLARGINE 100 [IU]/ML
2 INJECTION, SOLUTION SUBCUTANEOUS EVERY MORNING
Status: DISCONTINUED | OUTPATIENT
Start: 2024-12-13 | End: 2024-12-13

## 2024-12-12 RX ADMIN — POLYETHYLENE GLYCOL 3350 17 G: 17 POWDER, FOR SOLUTION ORAL at 09:22

## 2024-12-12 RX ADMIN — INSULIN GLARGINE 5 UNITS: 100 INJECTION, SOLUTION SUBCUTANEOUS at 09:22

## 2024-12-12 RX ADMIN — ZINC SULFATE 220 MG (50 MG) CAPSULE 220 MG: CAPSULE at 09:22

## 2024-12-12 RX ADMIN — INSULIN LISPRO 2 UNITS: 100 INJECTION, SOLUTION INTRAVENOUS; SUBCUTANEOUS at 11:54

## 2024-12-12 RX ADMIN — TIMOLOL MALEATE 1 DROP: 5 SOLUTION OPHTHALMIC at 09:21

## 2024-12-12 RX ADMIN — TORSEMIDE 40 MG: 20 TABLET ORAL at 17:59

## 2024-12-12 RX ADMIN — NYSTATIN: 100000 POWDER TOPICAL at 18:00

## 2024-12-12 RX ADMIN — NYSTATIN: 100000 POWDER TOPICAL at 09:21

## 2024-12-12 RX ADMIN — ALLOPURINOL 100 MG: 100 TABLET ORAL at 17:59

## 2024-12-12 RX ADMIN — FAMOTIDINE 10 MG: 20 TABLET, FILM COATED ORAL at 22:53

## 2024-12-12 RX ADMIN — INSULIN LISPRO 2 UNITS: 100 INJECTION, SOLUTION INTRAVENOUS; SUBCUTANEOUS at 09:21

## 2024-12-12 RX ADMIN — OXYCODONE HYDROCHLORIDE AND ACETAMINOPHEN 500 MG: 500 TABLET ORAL at 09:22

## 2024-12-12 RX ADMIN — Medication 2000 UNITS: at 09:22

## 2024-12-12 RX ADMIN — ATORVASTATIN CALCIUM 40 MG: 40 TABLET, FILM COATED ORAL at 17:59

## 2024-12-12 RX ADMIN — LATANOPROST 1 DROP: 50 SOLUTION OPHTHALMIC at 22:55

## 2024-12-12 RX ADMIN — DOCUSATE SODIUM 100 MG: 100 CAPSULE, LIQUID FILLED ORAL at 09:22

## 2024-12-12 RX ADMIN — ALLOPURINOL 100 MG: 100 TABLET ORAL at 09:22

## 2024-12-12 RX ADMIN — TAMSULOSIN HYDROCHLORIDE 0.4 MG: 0.4 CAPSULE ORAL at 17:59

## 2024-12-12 RX ADMIN — SENNOSIDES 17.2 MG: 8.6 TABLET, FILM COATED ORAL at 22:54

## 2024-12-12 RX ADMIN — INSULIN LISPRO 1 UNITS: 100 INJECTION, SOLUTION INTRAVENOUS; SUBCUTANEOUS at 22:55

## 2024-12-12 RX ADMIN — TORSEMIDE 40 MG: 20 TABLET ORAL at 09:22

## 2024-12-12 RX ADMIN — FERROUS SULFATE TAB 325 MG (65 MG ELEMENTAL FE) 325 MG: 325 (65 FE) TAB at 09:22

## 2024-12-12 NOTE — ASSESSMENT & PLAN NOTE
Wt Readings from Last 3 Encounters:   12/12/24 62.5 kg (137 lb 12.8 oz)   08/19/24 67.2 kg (148 lb 3.2 oz)   08/08/24 66.7 kg (147 lb)     History of CKD 4 diabetes mellitus BPH atrial fibrillation and combined CHF who presents to the hospital for worsening leg edema  Nephrology and cardiology consulted.  There is history of dietary indiscretion including consumption of salt food  Transition from IV Lasix to p.o. torsemide per nephrology recommendations.  continue on torsemide 40 mg twice daily  Continue to monitor I/os Daily weight.    Intake/Output Summary (Last 24 hours) at 12/12/2024 1023  Last data filed at 12/12/2024 0948  Gross per 24 hour   Intake 660 ml   Output 1750 ml   Net -1090 ml

## 2024-12-12 NOTE — ASSESSMENT & PLAN NOTE
With history of urinary retention previously  Continue tamsulosin  Consult urology given acute urinary retention noted on 12/9.   Normal rate, regular rhythm.  Heart sounds S1, S2.  No murmurs, rubs or gallops.

## 2024-12-12 NOTE — PLAN OF CARE
Problem: PAIN - ADULT  Goal: Verbalizes/displays adequate comfort level or baseline comfort level  Description: Interventions:  - Encourage patient to monitor pain and request assistance  - Assess pain using appropriate pain scale  - Administer analgesics based on type and severity of pain and evaluate response  - Implement non-pharmacological measures as appropriate and evaluate response  - Consider cultural and social influences on pain and pain management  - Notify physician/advanced practitioner if interventions unsuccessful or patient reports new pain  Outcome: Progressing     Problem: INFECTION - ADULT  Goal: Absence or prevention of progression during hospitalization  Description: INTERVENTIONS:  - Assess and monitor for signs and symptoms of infection  - Monitor lab/diagnostic results  - Monitor all insertion sites, i.e. indwelling lines, tubes, and drains  - Monitor endotracheal if appropriate and nasal secretions for changes in amount and color  - Randall appropriate cooling/warming therapies per order  - Administer medications as ordered  - Instruct and encourage patient and family to use good hand hygiene technique  - Identify and instruct in appropriate isolation precautions for identified infection/condition  Outcome: Progressing  Goal: Absence of fever/infection during neutropenic period  Description: INTERVENTIONS:  - Monitor WBC    Outcome: Progressing     Problem: SAFETY ADULT  Goal: Patient will remain free of falls  Description: INTERVENTIONS:  - Educate patient/family on patient safety including physical limitations  - Instruct patient to call for assistance with activity   - Consult OT/PT to assist with strengthening/mobility   - Keep Call bell within reach  - Keep bed low and locked with side rails adjusted as appropriate  - Keep care items and personal belongings within reach  - Initiate and maintain comfort rounds  - Make Fall Risk Sign visible to staff  - Offer Toileting every 2 Hours,  in advance of need  - Initiate/Maintain bed alarm  - Obtain necessary fall risk management equipment: no slip socks   - Apply yellow socks and bracelet for high fall risk patients  - Consider moving patient to room near nurses station  Outcome: Progressing  Goal: Maintain or return to baseline ADL function  Description: INTERVENTIONS:  -  Assess patient's ability to carry out ADLs; assess patient's baseline for ADL function and identify physical deficits which impact ability to perform ADLs (bathing, care of mouth/teeth, toileting, grooming, dressing, etc.)  - Assess/evaluate cause of self-care deficits   - Assess range of motion  - Assess patient's mobility; develop plan if impaired  - Assess patient's need for assistive devices and provide as appropriate  - Encourage maximum independence but intervene and supervise when necessary  - Involve family in performance of ADLs  - Assess for home care needs following discharge   - Consider OT consult to assist with ADL evaluation and planning for discharge  - Provide patient education as appropriate  Outcome: Progressing  Goal: Maintains/Returns to pre admission functional level  Description: INTERVENTIONS:  - Perform AM-PAC 6 Click Basic Mobility/ Daily Activity assessment daily.  - Set and communicate daily mobility goal to care team and patient/family/caregiver.   - Collaborate with rehabilitation services on mobility goals if consulted  - Perform Range of Motion 3 times a day.  - Reposition patient every 2 hours.  - Dangle patient 3 times a day  - Stand patient 3 times a day  - Ambulate patient 3 times a day  - Out of bed to chair 3 times a day   - Out of bed for meals 3 times a day  - Out of bed for toileting  - Record patient progress and toleration of activity level   Outcome: Progressing     Problem: DISCHARGE PLANNING  Goal: Discharge to home or other facility with appropriate resources  Description: INTERVENTIONS:  - Identify barriers to discharge w/patient and  caregiver  - Arrange for needed discharge resources and transportation as appropriate  - Identify discharge learning needs (meds, wound care, etc.)  - Arrange for interpretive services to assist at discharge as needed  - Refer to Case Management Department for coordinating discharge planning if the patient needs post-hospital services based on physician/advanced practitioner order or complex needs related to functional status, cognitive ability, or social support system  Outcome: Progressing     Problem: Knowledge Deficit  Goal: Patient/family/caregiver demonstrates understanding of disease process, treatment plan, medications, and discharge instructions  Description: Complete learning assessment and assess knowledge base.  Interventions:  - Provide teaching at level of understanding  - Provide teaching via preferred learning methods  Outcome: Progressing     Problem: Decreased Cardiac Output  Goal: Cardiac output adequate for individual needs  Description: INTERVENTIONS: Monitor for signs and symptoms of decreased cardiac output   - Monitor for dyspnea with exertion and at rest  - Monitor for orthopnea  - Monitor for signs of tachycardia. Place patient on telemetry monitoring.  - Assess patient for jugular vein distention  - Assess patient for lower extremity edema and poor peripheral perfusion   - Auscultate lung sound for Fine bibasilar crackles   - Monitor for cardiac arrythmias   - Administer beta blockers, antiarrhythmic, and blood pressure medications as ordered    Outcome: Progressing     Problem: Impaired Gas Exchange  Goal: Optimize oxygenation and ensure adequate ventilation  Description: INTERVENTIONS: Monitor for signs and symptoms of respiratory distress                - Elevate HOB or use high fowlers to promote lung expansion                - Administer oxygen as ordered to maintain adequate oxygenation                - Encourage use of IS to promote lung expansion and prevent PN                -  Monitor ABGs to assess oxygenation status                - Monitor blood oxygen level to maintain adequate oxygenation                - Encourage cough and deep breathing exercises to promote lung expansion                - Monitor patient's mental status for increased confusion    Outcome: Progressing     Problem: Excess Fluid Volume  Goal: Patient is able to achieve and maintain homeostasis  Description: INTERVENTIONS: Monitor for sign and symptoms of fluid overload  - Evaluate LE edema every shift  - Elevate LE to prevent dependent edema  - Apply SHIV stockings as ordered   - Monitor ankle circumference daily  - Assess for jugular vein distention  - Evaluate provider orders for the CHF diuretic algorithm. Administer diuretics as ordered  - Weigh the patient daily at 0600 and report a weight gain of five pounds or more   - Strict intake and output  - Monitor fluid intake and adhere to fluid restrictions  - Assess lung sounds every shift and as needed  - Monitor vital signs and lab values (CBC, chem, BUN, BNP)  - Measure and document urine output    Outcome: Progressing     Problem: Activity Intolerance  Goal: Patient is able to perform activities within their limitations  Description: INTERVENTIONS:                       -   Alternate periods of activity with periods of rest                 -   Patients is able to maintain normal vitals heart rhythm during activity                 -   Gradually increase activity and exercise as patient can tolerate                 -   Monitor blood pressure and heart before and after exercise                  -   Monitor blood oxygen saturation during activity and apply oxygen as needed    Outcome: Progressing     Problem: Knowledge Deficit  Goal: Patient is able to verbalize understanding of Heart Failure after education  Description: INTERVENTIONS:  - Educate the patient and family on signs and symptoms of HF  - Provide the patient with HF education and HF zone tool  - Educate on  the importance of daily weight in the AM and reporting a weight gain               of 3 or more pounds to their primary care physician  - Monitor for SOB  - Maintain and sodium and fluid restriction  - Educate the patient on the importance of medications such as: diuretics, betablockers,               antiarrhythmics and their purpose, dose, route, side effects and labs               if they are needed    Outcome: Progressing     Problem: Prexisting or High Potential for Compromised Skin Integrity  Goal: Skin integrity is maintained or improved  Description: INTERVENTIONS:  - Identify patients at risk for skin breakdown  - Assess and monitor skin integrity  - Assess and monitor nutrition and hydration status  - Monitor labs   - Assess for incontinence   - Turn and reposition patient  - Assist with mobility/ambulation  - Relieve pressure over bony prominences  - Avoid friction and shearing  - Provide appropriate hygiene as needed including keeping skin clean and dry  - Evaluate need for skin moisturizer/barrier cream  - Collaborate with interdisciplinary team   - Patient/family teaching  - Consider wound care consult   Outcome: Progressing     Problem: Nutrition/Hydration-ADULT  Goal: Nutrient/Hydration intake appropriate for improving, restoring or maintaining nutritional needs  Description: Monitor and assess patient's nutrition/hydration status for malnutrition. Collaborate with interdisciplinary team and initiate plan and interventions as ordered.  Monitor patient's weight and dietary intake as ordered or per policy. Utilize nutrition screening tool and intervene as necessary. Determine patient's food preferences and provide high-protein, high-caloric foods as appropriate.     INTERVENTIONS:  - Monitor oral intake, urinary output, labs, and treatment plans  - Assess nutrition and hydration status and recommend course of action  - Evaluate amount of meals eaten  - Assist patient with eating if necessary   - Allow  adequate time for meals  - Recommend/ encourage appropriate diets, oral nutritional supplements, and vitamin/mineral supplements  - Order, calculate, and assess calorie counts as needed  - Recommend, monitor, and adjust tube feedings and TPN/PPN based on assessed needs  - Assess need for intravenous fluids  - Provide specific nutrition/hydration education as appropriate  - Include patient/family/caregiver in decisions related to nutrition  Outcome: Progressing

## 2024-12-12 NOTE — ASSESSMENT & PLAN NOTE
Lab Results   Component Value Date    HGBA1C 7.7 (H) 12/07/2024     Recent Labs     12/11/24  1133 12/11/24  1558 12/11/24  2120 12/12/24  0719   POCGLU 183* 198* 242* 247*     Prior to admission on glimepiride  Lantus 5 units nightly  Sliding scale only for now  Monitor glucose log

## 2024-12-12 NOTE — ASSESSMENT & PLAN NOTE
Baseline creatinine closer to 2.0.  Nephrology consulted    Results from last 7 days   Lab Units 12/12/24  0448 12/11/24  0500 12/10/24  0513 12/09/24  0453 12/08/24  0434 12/07/24  1439   BUN mg/dL 55* 56* 57* 60* 65* 63*   CREATININE mg/dL 2.01* 1.96* 1.95* 1.94* 1.95* 1.97*   EGFR ml/min/1.73sq m 29 30 30 30 30 29   Renal function appears baseline

## 2024-12-12 NOTE — ASSESSMENT & PLAN NOTE
-Outpatient nephrologist Dr. 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  Lasix was increased to IV 40 mg twice daily on 12/8  - 12/9-creatinine stable 1.9 mg/dL.  Had adequate urine output with increased Lasix yesterday.  Patient still with pulmonary edema on exam. Na, K, bicarb appropriate. Hypoglycemia improved with management by primary team  - 12/10-creatinine stable 1.9.  Sodium, potassium, bicarbonate, phosphorus and magnesium stable and appropriate.  Chest x-ray with persistent pulmonary edema and pleural effusions and persistent bibasilar airspace opacity  - 12/11 -creatinine stable 1.9.  Sodium borderline low 133 will monitor for now.  Potassium is okay.  Will change IV Lasix to torsemide  - 12/12-creatinine relatively stable at 2.01.  Sodium level appropriate when corrected for hyperglycemia.  Magnesium phosphorus and potassium are appropriate.    Plan  - Continue torsemide 40 mg twice daily  - BMP in a.m.  - I/os; avoid nephrotoxic agents  - Avoid hypotension  - Lower extremity PVD-currently asymptomatic but does have wounds on lower extremities.  Further management per primary team.  No DVT.     Where Do You Want The Question To Include Opioid Counseling Located?: Case Summary Tab

## 2024-12-12 NOTE — ASSESSMENT & PLAN NOTE
27w5d    Patient reporting worsening heartburn/acid reflux symptoms since starting famotidine 23  Is taking famotidine BID  Has tried tums, and lifestyle/diet changes with no relief  Not reporting any other symptoms, no vision changes, headaches, or swelling    Patient requesting starting a PPI medication for treatment instead of famotidine    Preferred pharmacy confirmed with patient, [facility] pharmacy     Routed to Dr. Trent  Please advise    Continue statin

## 2024-12-12 NOTE — PROGRESS NOTES
Progress Note - Nephrology   Name: Yao Tipton 84 y.o. male I MRN: [id number]  Unit/Bed#: 4 [place] [address]-01 I Date of Admission: 12/7/2024   Date of Service: 12/12/2024 I Hospital Day: 4    Assessment & Plan  Chronic kidney disease, stage 4 (severe) ([facility])  -Outpatient nephrologist Dr. 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  Lasix was increased to IV 40 mg twice daily on 12/8  - 12/9-creatinine stable 1.9 mg/dL.  Had adequate urine output with increased Lasix yesterday.  Patient still with pulmonary edema on exam. Na, K, bicarb appropriate. Hypoglycemia improved with management by primary team  - 12/10-creatinine stable 1.9.  Sodium, potassium, bicarbonate, phosphorus and magnesium stable and appropriate.  Chest x-ray with persistent pulmonary edema and pleural effusions and persistent bibasilar airspace opacity  - 12/11 -creatinine stable 1.9.  Sodium borderline low 133 will monitor for now.  Potassium is okay.  Will change IV Lasix to torsemide  - 12/12-creatinine relatively stable at 2.01.  Sodium level appropriate when corrected for hyperglycemia.  Magnesium phosphorus and potassium are appropriate.    Plan  - Continue torsemide 40 mg twice daily  - BMP in a.m.  - I/os; avoid nephrotoxic agents  - Avoid hypotension  - Lower extremity PVD-currently asymptomatic but does have wounds on lower extremities.  Further management per primary team.  No DVT.    Acute on chronic combined systolic and diastolic congestive heart failure (HCC)  -Noted weight increase from April  - CT chest small R pl effusion, mod L pl effusion, atelectasis  - To note has also high salt intake during the holidays  - Current creatinine is below baseline  - Lasix was  increased 12/8 and patient is tolerating IV Lasix well.  Changed to oral torsemide on 12/11  - See discussion above    Essential hypertension  Avoid hypotension and monitoring closely with diuresis on board  Chronic atrial fibrillation (HCC)  -Per primary team  Thrombocytopenia (HCC)  -Chronic thrombocytopenia.  Monitor per primary team  BPH (benign prostatic hyperplasia)  -On tamsulosin  - Continue urinary retention protocol-required straight catheterization which revealed 600 cc urine output  Open wound of both lower extremities  -- Continue diuresis-wound management per primary team  Acute urinary retention  - Urology on board.  - Had hematuria.  Being evaluated by urology team.  May be traumatic Dupont insertion    I have reviewed the nephrology recommendations including continue torsemide at current dose, with primary team attending, and we are in agreement with renal plan including the information outlined above.     Subjective   Brief History of Admission - 84-year-old male with a past medical history of CKD stage IV, CHF, A-fib, hypertension, diabetes, BPH who initially presents with exertional shortness of breath. Was advised to come to the ER by his son. Nephrology is on board for CKD.  Patient was diuresed and volume state improved.  Creatinine remained stable.  Course complicated by hematuria.  Possibly in setting of traumatic Dupont insertion.    Patient denies complaints.  Blood pressures 1 10-1 25 systolic.  Afebrile.  On room air.  Urine output 1.4 L yesterday.  Objective :  Temp:  [97.2 °F (36.2 °C)-97.8 °F (36.6 °C)] 97.8 °F (36.6 °C)  HR:  [64-80] 69  BP: (110-130)/(53-66) 110/53  Resp:  [16-18] 18  SpO2:  [95 %-98 %] 97 %  O2 Device: None (Room air)    Current Weight: Weight - Scale: 62.5 kg (137 lb 12.8 oz)  First Weight: Weight - Scale: 66.7 kg (147 lb)  I/O         12/10 0701 12/11 0700 12/11 0701 12/12 0700 12/12 0701 12/13 0700    P.O. 420 1020     Total Intake(mL/kg) 420 (6.7) 1020 (16.3)      Urine (mL/kg/hr) 2000 (1.3) 1400 (0.9) 350 (1.4)    Stool       Total Output 2000 1400 350    Net -1195 -380 -350                 Physical Exam  General: NAD  Skin: no rash  Eyes: anicteric sclera  ENT: moist mucous membrane  Neck: supple  Chest: CTA b/l, no ronchii, no wheeze, no rubs, no rales  CVS: s1s2, no murmur, no gallop, no rub  Abdomen: soft, nontender, nl sounds  Extremities: Improved edema now trace lower extremities.  Bilateral lower extremity wounds wrapped   : Positive victor punch colored urine improving slightly  Neuro: AAOX3  Psych: normal affect    Medications:    Current Facility-Administered Medications:     acetaminophen (TYLENOL) tablet 650 mg, 650 mg, Oral, Q6H PRN, Chanaikwesi Peñarik, CRNP    allopurinol (ZYLOPRIM) tablet 100 mg, 100 mg, Oral, BID, Cuikwesi Peñarik, CRNP, 100 mg at 12/12/24 0922    ascorbic acid (VITAMIN C) tablet 500 mg, 500 mg, Oral, Daily, Cuiyin Marianarik, CRNP, 500 mg at 12/12/24 0922    atorvastatin (LIPITOR) tablet 40 mg, 40 mg, Oral, Daily With Dinner, Cuiyialan Peñarik, CRNP, 40 mg at 12/11/24 1620    Cholecalciferol (VITAMIN D3) tablet 2,000 Units, 2,000 Units, Oral, Daily, Chanaiyialan Peñarik, CRNP, 2,000 Units at 12/12/24 0922    docusate sodium (COLACE) capsule 100 mg, 100 mg, Oral, BID, Kenney Jeronimo DO, 100 mg at 12/12/24 0922    famotidine (PEPCID) tablet 10 mg, 10 mg, Oral, HS, Cuiyialan Yurik, CRNP, 10 mg at 12/11/24 2158    ferrous sulfate tablet 325 mg, 325 mg, Oral, Daily With Breakfast, Veronica Peñarik, CRNP, 325 mg at 12/12/24 0922    insulin glargine (LANTUS) subcutaneous injection 5 Units 0.05 mL, 5 Units, Subcutaneous, QAMYen MD, 5 Units at 12/12/24 0922    insulin lispro (HumALOG/ADMELOG) 100 units/mL subcutaneous injection 1-5 Units, 1-5 Units, Subcutaneous, TID AC, 2 Units at 12/12/24 0921 **AND** Fingerstick Glucose (POCT), , , TID AC, SUSANA Dunne    insulin lispro (HumALOG/ADMELOG) 100 units/mL subcutaneous injection 1-5 Units, 1-5 Units,  Subcutaneous, HS, SUSANA Dunne, 2 Units at 12/11/24 2203    latanoprost (XALATAN) 0.005 % ophthalmic solution 1 drop, 1 drop, Both Eyes, HS, SUSANA Dunne, 1 drop at 12/11/24 2356    nystatin (MYCOSTATIN) powder, , Topical, BID, Kenney Jeronimo DO, Given at 12/12/24 0921    polyethylene glycol (MIRALAX) packet 17 g, 17 g, Oral, Daily, Kenney Jeronimo DO, 17 g at 12/12/24 0922    senna (SENOKOT) tablet 17.2 mg, 2 tablet, Oral, HS, Kenney Jeronimo DO, 17.2 mg at 12/11/24 2201    sodium chloride (OCEAN) 0.65 % nasal spray 1 spray, 1 spray, Each Nare, Q1H PRN, SUSANA Dunne    tamsulosin (FLOMAX) capsule 0.4 mg, 0.4 mg, Oral, Daily With Dinner, SUSANA Dunne, 0.4 mg at 12/11/24 1620    timolol (TIMOPTIC) 0.5 % ophthalmic solution 1 drop, 1 drop, Both Eyes, Daily, SUSANA Dunne, 1 drop at 12/12/24 0921    torsemide (DEMADEX) tablet 40 mg, 40 mg, Oral, BID (diuretic), Heidi Sweeney MD, 40 mg at 12/12/24 0922    zinc sulfate (ZINCATE) capsule 220 mg, 220 mg, Oral, Daily, SUSANA Dunne, 220 mg at 12/12/24 0922      Lab Results: I have reviewed the following results:  Results from last 7 days   Lab Units 12/12/24  0448 12/11/24  0500 12/10/24  1050 12/10/24  0513 12/09/24  0453 12/08/24  0434 12/07/24  1439   WBC Thousand/uL 6.17 4.10* 6.69  --  5.22 4.19* 6.13   HEMOGLOBIN g/dL 10.2* 9.8* 9.8*  --  9.7* 9.9* 10.9*   HEMATOCRIT % 32.7* 30.4* 30.8*  --  30.7* 30.5* 34.0*   PLATELETS Thousands/uL 81* 84* 85*  --  89* 93* 100*   POTASSIUM mmol/L 4.6 4.0  --  4.0 4.0 4.3 4.6   CHLORIDE mmol/L 97 97  --  99 100 100 97   CO2 mmol/L 28 29  --  31 28 32 27   BUN mg/dL 55* 56*  --  57* 60* 65* 63*   CREATININE mg/dL 2.01* 1.96*  --  1.95* 1.94* 1.95* 1.97*   CALCIUM mg/dL 8.3* 7.9*  --  8.0* 8.0* 8.3* 8.4   MAGNESIUM mg/dL 2.2 2.0  --  2.1  --  2.2 2.2   PHOSPHORUS mg/dL 3.0 3.4  --  3.2  --   --   --    ALBUMIN g/dL  --   --   --   --  3.3*  --  3.3*       Administrative Statements  "    Portions of the record may have been created with voice recognition software. Occasional wrong word or \"sound a like\" substitutions may have occurred due to the inherent limitations of voice recognition software. Read the chart carefully and recognize, using context, where substitutions have occurred.If you have any questions, please contact the dictating provider.  "

## 2024-12-12 NOTE — PROGRESS NOTES
Progress Note - Hospitalist   Name: Yao Tipton 84 y.o. male I MRN: [id number]  Unit/Bed#: 4 [address] I Date of Admission: 12/7/2024   Date of Service: 12/12/2024 I Hospital Day: 4    Assessment & Plan  Acute on chronic combined systolic and diastolic congestive heart failure (HCC)  Wt Readings from Last 3 Encounters:   12/12/24 62.5 kg (137 lb 12.8 oz)   08/19/24 67.2 kg (148 lb 3.2 oz)   08/08/24 66.7 kg (147 lb)     History of CKD 4 diabetes mellitus BPH atrial fibrillation and combined CHF who presents to the hospital for worsening leg edema  Nephrology and cardiology consulted.  There is history of dietary indiscretion including consumption of salt food  Transition from IV Lasix to p.o. torsemide per nephrology recommendations.  continue on torsemide 40 mg twice daily  Continue to monitor I/os Daily weight.    Intake/Output Summary (Last 24 hours) at 12/12/2024 1023  Last data filed at 12/12/2024 0948  Gross per 24 hour   Intake 660 ml   Output 1750 ml   Net -1090 ml       Chronic kidney disease, stage 4 (severe) ([facility])  Baseline creatinine closer to 2.0.  Nephrology consulted    Results from last 7 days   Lab Units 12/12/24  0448 12/11/24  0500 12/10/24  0513 12/09/24  0453 12/08/24  0434 12/07/24  1439   BUN mg/dL 55* 56* 57* 60* 65* 63*   CREATININE mg/dL 2.01* 1.96* 1.95* 1.94* 1.95* 1.97*   EGFR ml/min/1.73sq m 29 30 30 30 30 29   Renal function appears baseline  Open wound of both lower extremities  Blisters of lower extremities; wound care consulted  Continue localized wound care, does not look overly infected  Diabetes mellitus with peripheral artery disease (HCC)  Lab Results   Component Value Date    HGBA1C 7.7 (H) 12/07/2024     Recent Labs     12/11/24  1133 12/11/24  1558 12/11/24  2120 12/12/24  0719   POCGLU 183* 198* 242* 247*     Prior to admission on glimepiride  Lantus 5 units nightly  Sliding scale only for now  Monitor glucose log  Chronic atrial fibrillation (HCC)  Chronic atrial  fibrillation bradycardia.  Monitor on telemetry.  Anticoagulation: hold eliquis. Plan to restart in the next 24 hrs     Duodenal ulcer  History of Beck's esophagitis with large duodenal ulcer  Started on famotidine this admission  BPH (benign prostatic hyperplasia)  With history of urinary retention previously  Continue tamsulosin  Consult urology given acute urinary retention noted on 12/9.  Essential hypertension  Patient on torsemide alone at home  Due to bradycardia he was not initiated on beta-blocker    Thrombocytopenia (HCC)  Baseline platelet count 101 - 143 in past year  Platelet count 100 today  Monitor count  Dyslipidemia  Continue statin  Gout  Continue allopurinol  Acute urinary retention  Noted per nursing on 12/9.  S/p Dupont's catheter insertion      -Voiding trial next 24 hours.  Gross hematuria  Noted on 12/10 during rounds most probably traumatic secondary to Dupont's catheter insertion  No clots noted    -Hold Eliquis Day2#   -urine clearing up . Plan to restart eliquis in the next 24 hrs  -Consult urology; voiding trial when appropriate per urology recs   Macrocytic anemia  Check B12 and folate    VTE Pharmacologic Prophylaxis: VTE Score: 4 Moderate Risk (Score 3-4) - Pharmacological DVT Prophylaxis Contraindicated. Sequential Compression Devices Ordered.    Mobility:   Basic Mobility Inpatient Raw Score: 17  JH-HLM Goal: 5: Stand one or more mins  JH-HLM Achieved: 5: Stand (1 or more minutes)  JH-HLM Goal achieved. Continue to encourage appropriate mobility.    Patient Centered Rounds: I performed bedside rounds with nursing staff today.   Discussions with Specialists or Other Care Team Provider: nephrology , Urology     Education and Discussions with Family / Patient: Updated  (son) via phone.    Current Length of Stay: 4 day(s)  Current Patient Status: Inpatient   Certification Statement: The patient will continue to require additional inpatient hospital stay due to CHF,  hematuria, urinary retention  Discharge Plan: Anticipate discharge in 24-48 hrs to home.    Code Status: Level 3 - DNAR and DNI    Subjective   Patient seen today at bedside.  He is feeling well.  No active complaints.  No chest pain or tightness, SOB or cough, dizziness or light headedness, N/V, Diarrhea of constipation.         Objective :  Temp:  [97.2 °F (36.2 °C)-97.8 °F (36.6 °C)] 97.8 °F (36.6 °C)  HR:  [64-80] 69  BP: (110-130)/(53-66) 110/53  Resp:  [16-18] 18  SpO2:  [95 %-98 %] 97 %  O2 Device: None (Room air)    Body mass index is 20.95 kg/m².     Input and Output Summary (last 24 hours):     Intake/Output Summary (Last 24 hours) at 12/12/2024 1023  Last data filed at 12/12/2024 0948  Gross per 24 hour   Intake 660 ml   Output 1750 ml   Net -1090 ml       Physical Exam  Vitals and nursing note reviewed.   Constitutional:       General: He is not in acute distress.     Appearance: Normal appearance.   HENT:      Head: Normocephalic and atraumatic.      Mouth/Throat:      Mouth: Mucous membranes are moist.   Eyes:      Conjunctiva/sclera: Conjunctivae normal.      Pupils: Pupils are equal, round, and reactive to light.   Cardiovascular:      Rate and Rhythm: Normal rate and regular rhythm.      Pulses: Normal pulses.           Carotid pulses are 2+ on the right side and 2+ on the left side.       Radial pulses are 2+ on the right side and 2+ on the left side.        Dorsalis pedis pulses are 2+ on the right side and 2+ on the left side.      Heart sounds: Normal heart sounds, S1 normal and S2 normal. No murmur heard.  Pulmonary:      Effort: No tachypnea, bradypnea or accessory muscle usage.      Breath sounds: Normal breath sounds and air entry. No decreased breath sounds, wheezing, rhonchi or rales.   Abdominal:      General: Abdomen is flat. Bowel sounds are normal. There is no distension.      Palpations: Abdomen is soft.      Tenderness: There is no abdominal tenderness.   Genitourinary:     Comments:  Dupont's catheter bag noted at bedside.  Precipitants noted.  Straw-colored urine noted in the bag however clear yellow urine noted in the Dupont's catheter tube.  Musculoskeletal:      Cervical back: Normal range of motion.      Right lower leg: No edema.      Left lower leg: No edema.   Neurological:      Mental Status: He is alert and oriented to person, place, and time. Mental status is at baseline.   Psychiatric:         Mood and Affect: Mood normal.         Behavior: Behavior normal.           Lines/Drains:  Lines/Drains/Airways       Active Status       Name Placement date Placement time Site Days    Urethral Catheter 16 Fr. 12/09/24  1800  --  2                  Urinary Catheter:  Goal for removal: Voiding trial when ambulation improves                 Lab Results: I have reviewed the following results:   Results from last 7 days   Lab Units 12/12/24  0448 12/11/24  0500 12/10/24  1050   WBC Thousand/uL 6.17   < > 6.69   HEMOGLOBIN g/dL 10.2*   < > 9.8*   HEMATOCRIT % 32.7*   < > 30.8*   PLATELETS Thousands/uL 81*   < > 85*   SEGS PCT %  --   --  85*   LYMPHO PCT %  --   --  9*   MONO PCT %  --   --  6   EOS PCT %  --   --  0    < > = values in this interval not displayed.     Results from last 7 days   Lab Units 12/12/24  0448 12/10/24  0513 12/09/24  0453   SODIUM mmol/L 131*   < > 136   POTASSIUM mmol/L 4.6   < > 4.0   CHLORIDE mmol/L 97   < > 100   CO2 mmol/L 28   < > 28   BUN mg/dL 55*   < > 60*   CREATININE mg/dL 2.01*   < > 1.94*   ANION GAP mmol/L 6   < > 8   CALCIUM mg/dL 8.3*   < > 8.0*   ALBUMIN g/dL  --   --  3.3*   TOTAL BILIRUBIN mg/dL  --   --  0.74   ALK PHOS U/L  --   --  122*   ALT U/L  --   --  22   AST U/L  --   --  22   GLUCOSE RANDOM mg/dL 294*   < > 36*    < > = values in this interval not displayed.         Results from last 7 days   Lab Units 12/12/24  0719 12/11/24  2120 12/11/24  1558 12/11/24  1133 12/11/24  0713 12/10/24  2034 12/10/24  1607 12/10/24  1137 12/10/24  0710  12/10/24  0641 12/10/24  0620 12/09/24 2002   POC GLUCOSE mg/dl 247* 242* 198* 183* 80 187* 165* 153* 129 116 30* 148*     Results from last 7 days   Lab Units 12/07/24  1439   HEMOGLOBIN A1C % 7.7*           Recent Cultures (last 7 days):   Results from last 7 days   Lab Units 12/10/24  1606   URINE CULTURE  >100,000 cfu/ml Proteus mirabilis*       Imaging Results Review: No pertinent imaging studies reviewed.  Other Study Results Review: EKG was reviewed.     Last 24 Hours Medication List:     Current Facility-Administered Medications:     acetaminophen (TYLENOL) tablet 650 mg, Q6H PRN    allopurinol (ZYLOPRIM) tablet 100 mg, BID    ascorbic acid (VITAMIN C) tablet 500 mg, Daily    atorvastatin (LIPITOR) tablet 40 mg, Daily With Dinner    Cholecalciferol (VITAMIN D3) tablet 2,000 Units, Daily    docusate sodium (COLACE) capsule 100 mg, BID    famotidine (PEPCID) tablet 10 mg, HS    ferrous sulfate tablet 325 mg, Daily With Breakfast    insulin glargine (LANTUS) subcutaneous injection 5 Units 0.05 mL, QAM    insulin lispro (HumALOG/ADMELOG) 100 units/mL subcutaneous injection 1-5 Units, TID AC **AND** Fingerstick Glucose (POCT), TID AC    insulin lispro (HumALOG/ADMELOG) 100 units/mL subcutaneous injection 1-5 Units, HS    latanoprost (XALATAN) 0.005 % ophthalmic solution 1 drop, HS    nystatin (MYCOSTATIN) powder, BID    polyethylene glycol (MIRALAX) packet 17 g, Daily    senna (SENOKOT) tablet 17.2 mg, HS    sodium chloride (OCEAN) 0.65 % nasal spray 1 spray, Q1H PRN    tamsulosin (FLOMAX) capsule 0.4 mg, Daily With Dinner    timolol (TIMOPTIC) 0.5 % ophthalmic solution 1 drop, Daily    torsemide (DEMADEX) tablet 40 mg, BID (diuretic)    zinc sulfate (ZINCATE) capsule 220 mg, Daily    Administrative Statements   Today, Patient Was Seen By: Yen Jay MD  I have spent a total time of 30 minutes in caring for this patient on the day of the visit/encounter including Diagnostic results.    **Please Note: This  note may have been constructed using a voice recognition system.**

## 2024-12-13 LAB
AMORPH URATE CRY URNS QL MICRO: ABNORMAL
ANION GAP SERPL CALCULATED.3IONS-SCNC: 6 MMOL/L (ref 4–13)
BACTERIA UR QL AUTO: ABNORMAL /HPF
BASOPHILS # BLD AUTO: 0.01 THOUSANDS/ÂΜL (ref 0–0.1)
BASOPHILS NFR BLD AUTO: 0 % (ref 0–1)
BILIRUB UR QL STRIP: NEGATIVE
BUN SERPL-MCNC: 60 MG/DL (ref 5–25)
CALCIUM SERPL-MCNC: 8 MG/DL (ref 8.4–10.2)
CHLORIDE SERPL-SCNC: 95 MMOL/L (ref 96–108)
CLARITY UR: ABNORMAL
CO2 SERPL-SCNC: 32 MMOL/L (ref 21–32)
COLOR UR: ABNORMAL
CREAT SERPL-MCNC: 2.17 MG/DL (ref 0.6–1.3)
EOSINOPHIL # BLD AUTO: 0.07 THOUSAND/ÂΜL (ref 0–0.61)
EOSINOPHIL NFR BLD AUTO: 2 % (ref 0–6)
ERYTHROCYTE [DISTWIDTH] IN BLOOD BY AUTOMATED COUNT: 16.7 % (ref 11.6–15.1)
GFR SERPL CREATININE-BSD FRML MDRD: 26 ML/MIN/1.73SQ M
GLUCOSE SERPL-MCNC: 101 MG/DL (ref 65–140)
GLUCOSE SERPL-MCNC: 198 MG/DL (ref 65–140)
GLUCOSE SERPL-MCNC: 209 MG/DL (ref 65–140)
GLUCOSE SERPL-MCNC: 304 MG/DL (ref 65–140)
GLUCOSE SERPL-MCNC: 50 MG/DL (ref 65–140)
GLUCOSE SERPL-MCNC: 53 MG/DL (ref 65–140)
GLUCOSE UR STRIP-MCNC: NEGATIVE MG/DL
HCT VFR BLD AUTO: 29.8 % (ref 36.5–49.3)
HGB BLD-MCNC: 9.6 G/DL (ref 12–17)
HGB UR QL STRIP.AUTO: ABNORMAL
IMM GRANULOCYTES # BLD AUTO: 0.01 THOUSAND/UL (ref 0–0.2)
IMM GRANULOCYTES NFR BLD AUTO: 0 % (ref 0–2)
KETONES UR STRIP-MCNC: NEGATIVE MG/DL
LEUKOCYTE ESTERASE UR QL STRIP: ABNORMAL
LYMPHOCYTES # BLD AUTO: 1.07 THOUSANDS/ÂΜL (ref 0.6–4.47)
LYMPHOCYTES NFR BLD AUTO: 24 % (ref 14–44)
MAGNESIUM SERPL-MCNC: 2.1 MG/DL (ref 1.9–2.7)
MCH RBC QN AUTO: 31.8 PG (ref 26.8–34.3)
MCHC RBC AUTO-ENTMCNC: 32.2 G/DL (ref 31.4–37.4)
MCV RBC AUTO: 99 FL (ref 82–98)
MONOCYTES # BLD AUTO: 0.64 THOUSAND/ÂΜL (ref 0.17–1.22)
MONOCYTES NFR BLD AUTO: 15 % (ref 4–12)
NEUTROPHILS # BLD AUTO: 2.59 THOUSANDS/ÂΜL (ref 1.85–7.62)
NEUTS SEG NFR BLD AUTO: 59 % (ref 43–75)
NITRITE UR QL STRIP: POSITIVE
NON-SQ EPI CELLS URNS QL MICRO: ABNORMAL /HPF
NRBC BLD AUTO-RTO: 0 /100 WBCS
PH UR STRIP.AUTO: 8 [PH]
PHOSPHATE SERPL-MCNC: 2.9 MG/DL (ref 2.3–4.1)
PLATELET # BLD AUTO: 72 THOUSANDS/UL (ref 149–390)
PMV BLD AUTO: 9.5 FL (ref 8.9–12.7)
POTASSIUM SERPL-SCNC: 4.3 MMOL/L (ref 3.5–5.3)
PROT UR STRIP-MCNC: ABNORMAL MG/DL
RBC # BLD AUTO: 3.02 MILLION/UL (ref 3.88–5.62)
RBC #/AREA URNS AUTO: ABNORMAL /HPF
SODIUM SERPL-SCNC: 133 MMOL/L (ref 135–147)
SP GR UR STRIP.AUTO: 1.02 (ref 1–1.03)
UROBILINOGEN UR STRIP-ACNC: <2 MG/DL
WBC # BLD AUTO: 4.39 THOUSAND/UL (ref 4.31–10.16)
WBC #/AREA URNS AUTO: ABNORMAL /HPF

## 2024-12-13 PROCEDURE — 82948 REAGENT STRIP/BLOOD GLUCOSE: CPT

## 2024-12-13 PROCEDURE — 81001 URINALYSIS AUTO W/SCOPE: CPT

## 2024-12-13 PROCEDURE — 80048 BASIC METABOLIC PNL TOTAL CA: CPT | Performed by: INTERNAL MEDICINE

## 2024-12-13 PROCEDURE — 83735 ASSAY OF MAGNESIUM: CPT | Performed by: INTERNAL MEDICINE

## 2024-12-13 PROCEDURE — 99232 SBSQ HOSP IP/OBS MODERATE 35: CPT | Performed by: INTERNAL MEDICINE

## 2024-12-13 PROCEDURE — 85025 COMPLETE CBC W/AUTO DIFF WBC: CPT

## 2024-12-13 PROCEDURE — 99232 SBSQ HOSP IP/OBS MODERATE 35: CPT

## 2024-12-13 PROCEDURE — 84100 ASSAY OF PHOSPHORUS: CPT | Performed by: INTERNAL MEDICINE

## 2024-12-13 RX ADMIN — TAMSULOSIN HYDROCHLORIDE 0.4 MG: 0.4 CAPSULE ORAL at 17:06

## 2024-12-13 RX ADMIN — Medication 2000 UNITS: at 08:20

## 2024-12-13 RX ADMIN — SENNOSIDES 17.2 MG: 8.6 TABLET, FILM COATED ORAL at 21:30

## 2024-12-13 RX ADMIN — ALLOPURINOL 100 MG: 100 TABLET ORAL at 17:06

## 2024-12-13 RX ADMIN — INSULIN LISPRO 3 UNITS: 100 INJECTION, SOLUTION INTRAVENOUS; SUBCUTANEOUS at 21:38

## 2024-12-13 RX ADMIN — NYSTATIN: 100000 POWDER TOPICAL at 08:24

## 2024-12-13 RX ADMIN — ZINC SULFATE 220 MG (50 MG) CAPSULE 220 MG: CAPSULE at 08:20

## 2024-12-13 RX ADMIN — TORSEMIDE 40 MG: 20 TABLET ORAL at 08:20

## 2024-12-13 RX ADMIN — FERROUS SULFATE TAB 325 MG (65 MG ELEMENTAL FE) 325 MG: 325 (65 FE) TAB at 08:20

## 2024-12-13 RX ADMIN — TORSEMIDE 40 MG: 20 TABLET ORAL at 17:08

## 2024-12-13 RX ADMIN — LATANOPROST 1 DROP: 50 SOLUTION OPHTHALMIC at 21:44

## 2024-12-13 RX ADMIN — DOCUSATE SODIUM 100 MG: 100 CAPSULE, LIQUID FILLED ORAL at 08:20

## 2024-12-13 RX ADMIN — ATORVASTATIN CALCIUM 40 MG: 40 TABLET, FILM COATED ORAL at 17:06

## 2024-12-13 RX ADMIN — FAMOTIDINE 10 MG: 20 TABLET, FILM COATED ORAL at 21:30

## 2024-12-13 RX ADMIN — INSULIN LISPRO 1 UNITS: 100 INJECTION, SOLUTION INTRAVENOUS; SUBCUTANEOUS at 11:47

## 2024-12-13 RX ADMIN — TIMOLOL MALEATE 1 DROP: 5 SOLUTION OPHTHALMIC at 08:25

## 2024-12-13 RX ADMIN — OXYCODONE HYDROCHLORIDE AND ACETAMINOPHEN 500 MG: 500 TABLET ORAL at 08:19

## 2024-12-13 RX ADMIN — INSULIN LISPRO 1 UNITS: 100 INJECTION, SOLUTION INTRAVENOUS; SUBCUTANEOUS at 17:09

## 2024-12-13 RX ADMIN — NYSTATIN: 100000 POWDER TOPICAL at 17:06

## 2024-12-13 RX ADMIN — ALLOPURINOL 100 MG: 100 TABLET ORAL at 08:20

## 2024-12-13 NOTE — ASSESSMENT & PLAN NOTE
Baseline creatinine closer to 2.0.  Nephrology consulted    Results from last 7 days   Lab Units 12/13/24  0536 12/12/24  0448 12/11/24  0500 12/10/24  0513 12/09/24  0453 12/08/24  0434 12/07/24  1439   BUN mg/dL 60* 55* 56* 57* 60* 65* 63*   CREATININE mg/dL 2.17* 2.01* 1.96* 1.95* 1.94* 1.95* 1.97*   EGFR ml/min/1.73sq m 26 29 30 30 30 30 29   Renal function appears baseline

## 2024-12-13 NOTE — PROGRESS NOTES
"Progress Note - Urology      Patient: Yao Tipton   : 1940 Sex: male   MRN: [id number]     CSN: [id number]  Unit/Bed#: [address] [place] [address]     SUBJECTIVE:   Patient seen on evening rounds  No issues with indwelling Dupont catheter  Diuresing nicely  Diabetes not under control      Objective   Vitals: /70   Pulse 79   Temp 97.9 °F (36.6 °C) (Temporal)   Resp 18   Ht 5' 8\" (1.727 m)   Wt 63.5 kg (140 lb)   SpO2 98%   BMI 21.29 kg/m²     I/O last 24 hours:  In: 750 [P.O.:740; I.V.:10]  Out: 1400 [Urine:1400]      Physical Exam:   General Alert awake   Normocephalic atraumatic PERRLA  Lungs clear bilaterally  Cardiac normal S1 normal S2  Abdomen soft, flank pain  Extremities no edema      Lab Results: CBC:   Lab Results   Component Value Date    WBC 4.39 2024    HGB 9.6 (L) 2024    HCT 29.8 (L) 2024    MCV 99 (H) 2024    PLT 72 (L) 2024    RBC 3.02 (L) 2024    MCH 31.8 2024    MCHC 32.2 2024    RDW 16.7 (H) 2024    MPV 9.5 2024    NRBC 0 2024     CMP:   Lab Results   Component Value Date    CL 95 (L) 2024    CO2 32 2024    BUN 60 (H) 2024    CREATININE 2.17 (H) 2024    CALCIUM 8.0 (L) 2024    AST 22 2024    ALT 22 2024    ALKPHOS 122 (H) 2024    EGFR 26 2024     Urinalysis:   Lab Results   Component Value Date    COLORU [place] 2024    CLARITYU Turbid 2024    SPECGRAV 1.020 2024    PHUR 8.0 2024    PHUR 5.0 2019    PHUR 5.5 01/10/2019    LEUKOCYTESUR Large (A) 2024    NITRITE Positive (A) 2024    GLUCOSEU Negative 2024    KETONESU Negative 2024    BILIRUBINUR Negative 2024    BLOODU Moderate (A) 2024     Urine Culture:   Lab Results   Component Value Date    URINECX >100,000 cfu/ml Proteus mirabilis (A) 12/10/2024     PSA: No results found for: \"PSA\"      Assessment/ Plan:  Urinary retention  Acute on chronic " congestive heart failure  Renal failure  Continue Dupont catheter voiding        Cliff Erazo MD

## 2024-12-13 NOTE — CASE MANAGEMENT
Case Management Discharge Planning Note    Patient name Yao Tipton  Location [address] [place] [address] [place] [facility]-* MRN [id number]  : 1940 Date 2024       Current Admission Date: 2024  Current Admission Diagnosis:Acute on chronic combined systolic and diastolic congestive heart failure (HCC)   Patient Active Problem List    Diagnosis Date Noted Date Diagnosed    Macrocytic anemia 2024     Gross hematuria 12/10/2024     Open wound of both lower extremities 2024     Unable to care for self 2024     Gout 2024     Left ankle pain 2023     Chronic combined systolic and diastolic CHF (congestive heart failure) ([facility]) 2023     Dyslipidemia 2023     BPH (benign prostatic hyperplasia) 2023     Cellulitis of left lower extremity 2023     Constipation 08/10/2023     Pleural effusion exudative 2023     Goals of care, counseling/discussion 2023     Duodenal ulcer 07/10/2023     Encephalopathy 2023     Recent empyema of lung with persistent locuted R hydropneumothorax 2023     Hypoglycemia 2023     Thrombocytopenia ([facility]) 2023     Diarrhea 2023     Hypothermia 2023     Septic shock ([facility]) 2023     Acute urinary retention 2023     Macrocytosis 2023     Chronic kidney disease-mineral and bone disorder 2023     Advanced care planning/counseling discussion 2023     Benign hypertension with CKD (chronic kidney disease) stage IV ([facility]) 2022     Persistent proteinuria 2022     COVID-19 2022     Electrolyte abnormality 2022     Cellulitis of left upper extremity 2021     Chronic atrial fibrillation ([facility]) 2021     Vitamin D deficiency 10/18/2019     Chronic kidney disease, stage 4 (severe) ([facility]) 2019     Acute on chronic combined systolic and diastolic congestive heart failure ([facility]) 2019     Recent pericardial effusion 2019     Leg edema  01/10/2019     Diabetes mellitus with peripheral artery disease (HCC) 01/10/2019     PVC (premature ventricular contraction) 12/19/2018     Essential hypertension 12/19/2018     Closed traumatic displaced fracture of distal end of left radius with malunion 02/09/2018       LOS (days): 5  Geometric Mean LOS (GMLOS) (days): 3.9  Days to GMLOS:-1.2     OBJECTIVE:  Risk of Unplanned Readmission Score: 29.67         Current admission status: Inpatient   Preferred Pharmacy:   [email or web address] [id number] - [address] [address]  Phone: [phone] Fax: [phone]    Primary Care Provider: Nicho Baltazar DO    Primary Insurance: MEDICARE  Secondary Insurance: BLUE CROSS    DISCHARGE DETAILS:    Discharge planning discussed with:: Patient     IMM Given (Date):: 12/13/24  IMM Given to:: Patient (IMM#2 reviewed with patient. Patient gave verbal understanding, signed, and was provided with original. Copy placed in scan bin.)

## 2024-12-13 NOTE — ASSESSMENT & PLAN NOTE
Lab Results   Component Value Date    HGBA1C 7.7 (H) 12/07/2024     Recent Labs     12/12/24  1711 12/12/24  2132 12/13/24  0800 12/13/24  0832   POCGLU 73 175* 50* 101     Prior to admission on glimepiride  Sliding scale only for now  Monitor glucose log

## 2024-12-13 NOTE — ASSESSMENT & PLAN NOTE
Wt Readings from Last 3 Encounters:   12/13/24 63.5 kg (140 lb)   08/19/24 67.2 kg (148 lb 3.2 oz)   08/08/24 66.7 kg (147 lb)     History of CKD 4 diabetes mellitus BPH atrial fibrillation and combined CHF who presents to the hospital for worsening leg edema  Nephrology and cardiology consulted.  There is history of dietary indiscretion including consumption of salt food  Transition from IV Lasix to p.o. torsemide per nephrology recommendations.  continue on torsemide 40 mg twice daily  Continue to monitor I/os Daily weight.    Intake/Output Summary (Last 24 hours) at 12/13/2024 0937  Last data filed at 12/13/2024 0547  Gross per 24 hour   Intake 270 ml   Output 1400 ml   Net -1130 ml

## 2024-12-13 NOTE — PROGRESS NOTES
"Progress Note - Urology      Patient: Yao Tipton   : 1940 Sex: male   MRN: [id number]     CSN: [id number]  Unit/Bed#: [address] [place] [address]     SUBJECTIVE:   Patient seen on evening rounds  No issues with indwelling Dupont catheter  Diuresing nicely  Diabetes not under control      Objective   Vitals: /70   Pulse 79   Temp 97.9 °F (36.6 °C) (Temporal)   Resp 18   Ht 5' 8\" (1.727 m)   Wt 63.5 kg (140 lb)   SpO2 98%   BMI 21.29 kg/m²     I/O last 24 hours:  In: 750 [P.O.:740; I.V.:10]  Out: 1400 [Urine:1400]      Physical Exam:   General Alert awake   Normocephalic atraumatic PERRLA  Lungs clear bilaterally  Cardiac normal S1 normal S2  Abdomen soft, flank pain  Extremities no edema      Lab Results: CBC:   Lab Results   Component Value Date    WBC 4.39 2024    HGB 9.6 (L) 2024    HCT 29.8 (L) 2024    MCV 99 (H) 2024    PLT 72 (L) 2024    RBC 3.02 (L) 2024    MCH 31.8 2024    MCHC 32.2 2024    RDW 16.7 (H) 2024    MPV 9.5 2024    NRBC 0 2024     CMP:   Lab Results   Component Value Date    CL 95 (L) 2024    CO2 32 2024    BUN 60 (H) 2024    CREATININE 2.17 (H) 2024    CALCIUM 8.0 (L) 2024    AST 22 2024    ALT 22 2024    ALKPHOS 122 (H) 2024    EGFR 26 2024     Urinalysis:   Lab Results   Component Value Date    COLORU [place] 2024    CLARITYU Turbid 2024    SPECGRAV 1.020 2024    PHUR 8.0 2024    PHUR 5.0 2019    PHUR 5.5 01/10/2019    LEUKOCYTESUR Large (A) 2024    NITRITE Positive (A) 2024    GLUCOSEU Negative 2024    KETONESU Negative 2024    BILIRUBINUR Negative 2024    BLOODU Moderate (A) 2024     Urine Culture:   Lab Results   Component Value Date    URINECX >100,000 cfu/ml Proteus mirabilis (A) 12/10/2024     PSA: No results found for: \"PSA\"      Assessment/ Plan:  Urinary retention  Acute on chronic " congestive heart failure  Renal failure  Continue Dupont catheter voiding        Cliff Erazo MD

## 2024-12-13 NOTE — ASSESSMENT & PLAN NOTE
-Noted weight increase from April  - CT chest small R pl effusion, mod L pl effusion, atelectasis  - To note has also high salt intake during the holidays  - Current creatinine is below baseline  - Lasix was increased 12/8 and patient is tolerating IV Lasix well.  Changed to oral torsemide on 12/11 and remains euvolemic 12/13  - See discussion above

## 2024-12-13 NOTE — CASE MANAGEMENT
Case Management Discharge Planning Note    Patient name Yao Tipton  Location 4 [place] [facility]/4 [place] [facility]-* MRN [id number]  : 1940 Date 2024       Current Admission Date: 2024  Current Admission Diagnosis:Acute on chronic combined systolic and diastolic congestive heart failure (HCC)   Patient Active Problem List    Diagnosis Date Noted Date Diagnosed    Macrocytic anemia 2024     Gross hematuria 12/10/2024     Open wound of both lower extremities 2024     Unable to care for self 2024     Gout 2024     Left ankle pain 2023     Chronic combined systolic and diastolic CHF (congestive heart failure) ([facility]) 2023     Dyslipidemia 2023     BPH (benign prostatic hyperplasia) 2023     Cellulitis of left lower extremity 2023     Constipation 08/10/2023     Pleural effusion exudative 2023     Goals of care, counseling/discussion 2023     Duodenal ulcer 07/10/2023     Encephalopathy 2023     Recent empyema of lung with persistent locuted R hydropneumothorax 2023     Hypoglycemia 2023     Thrombocytopenia ([facility]) 2023     Diarrhea 2023     Hypothermia 2023     Septic shock ([facility]) 2023     Acute urinary retention 2023     Macrocytosis 2023     Chronic kidney disease-mineral and bone disorder 2023     Advanced care planning/counseling discussion 2023     Benign hypertension with CKD (chronic kidney disease) stage IV ([facility]) 2022     Persistent proteinuria 2022     COVID-19 2022     Electrolyte abnormality 2022     Cellulitis of left upper extremity 2021     Chronic atrial fibrillation ([facility]) 2021     Vitamin D deficiency 10/18/2019     Chronic kidney disease, stage 4 (severe) ([facility]) 2019     Acute on chronic combined systolic and diastolic congestive heart failure ([facility]) 2019     Recent pericardial effusion 2019     Leg edema  01/10/2019     Diabetes mellitus with peripheral artery disease (HCC) 01/10/2019     PVC (premature ventricular contraction) 12/19/2018     Essential hypertension 12/19/2018     Closed traumatic displaced fracture of distal end of left radius with malunion 02/09/2018       LOS (days): 5  Geometric Mean LOS (GMLOS) (days): 3.9  Days to GMLOS:-1.3     OBJECTIVE:  Risk of Unplanned Readmission Score: 29.73         Current admission status: Inpatient   [facility]   [id number] [address] [address]  Phone: [phone] Fax: [phone]    Primary Care Provider: Nicho Baltazar DO    Primary Insurance: MEDICARE  Secondary Insurance: BLUE CROSS    DISCHARGE DETAILS:    Other Referral/Resources/Interventions Provided:  Programs:: CHF (Inbasket message sent to OP CM for HF THAO.)

## 2024-12-13 NOTE — PROGRESS NOTES
Progress Note - Hospitalist   Name: Yao Tipton 84 y.o. male I MRN: [id number]  Unit/Bed#: [address] [place] [address]-01 I Date of Admission: 12/7/2024   Date of Service: 12/13/2024 I Hospital Day: 5    Assessment & Plan  Acute on chronic combined systolic and diastolic congestive heart failure (HCC)  Wt Readings from Last 3 Encounters:   12/13/24 63.5 kg (140 lb)   08/19/24 67.2 kg (148 lb 3.2 oz)   08/08/24 66.7 kg (147 lb)     History of CKD 4 diabetes mellitus BPH atrial fibrillation and combined CHF who presents to the hospital for worsening leg edema  Nephrology and cardiology consulted.  There is history of dietary indiscretion including consumption of salt food  Transition from IV Lasix to p.o. torsemide per nephrology recommendations.  continue on torsemide 40 mg twice daily  Continue to monitor I/os Daily weight.    Intake/Output Summary (Last 24 hours) at 12/13/2024 0931  Last data filed at 12/13/2024 0547  Gross per 24 hour   Intake 270 ml   Output 1400 ml   Net -1130 ml       Chronic kidney disease, stage 4 (severe) ([facility])  Baseline creatinine closer to 2.0.  Nephrology consulted    Results from last 7 days   Lab Units 12/13/24  0536 12/12/24  0448 12/11/24  0500 12/10/24  0513 12/09/24  0453 12/08/24  0434 12/07/24  1439   BUN mg/dL 60* 55* 56* 57* 60* 65* 63*   CREATININE mg/dL 2.17* 2.01* 1.96* 1.95* 1.94* 1.95* 1.97*   EGFR ml/min/1.73sq m 26 29 30 30 30 30 29   Renal function appears baseline  Open wound of both lower extremities  Blisters of lower extremities; wound care consulted  Continue localized wound care, does not look overly infected  Diabetes mellitus with peripheral artery disease (HCC)  Lab Results   Component Value Date    HGBA1C 7.7 (H) 12/07/2024     Recent Labs     12/12/24  1711 12/12/24  2132 12/13/24  0800 12/13/24  0832   POCGLU 73 175* 50* 101     Prior to admission on glimepiride  Sliding scale only for now  Monitor glucose log  Chronic atrial fibrillation (HCC)  Chronic atrial  fibrillation bradycardia.  Monitor on telemetry.  Anticoagulation: hold eliquis. Plan to restart in the next 24 hrs     Duodenal ulcer  History of Beck's esophagitis with large duodenal ulcer  Started on famotidine this admission  BPH (benign prostatic hyperplasia)  With history of urinary retention previously  Continue tamsulosin  Consult urology given acute urinary retention noted on 12/9.  Essential hypertension  Patient on torsemide alone at home  Due to bradycardia he was not initiated on beta-blocker    Thrombocytopenia (HCC)  Baseline platelet count 101 - 143 in past year  Platelet count 100 today  Monitor count  Dyslipidemia  Continue statin  Gout  Continue allopurinol  Acute urinary retention  Noted per nursing on 12/9.  S/p Dupont's catheter insertion      -Voiding trial next 24 hours.  Gross hematuria  Noted on 12/10 during rounds most probably traumatic secondary to Dupont's catheter insertion  No clots noted    -Hold Eliquis Day3#   -urine clearing up . Plan to restart eliquis in the next 24 hrs  -Consult urology; voiding trial when appropriate per urology recs   Macrocytic anemia  Check B12 and folate    VTE Pharmacologic Prophylaxis: VTE Score: 4 Moderate Risk (Score 3-4) - Pharmacological DVT Prophylaxis Contraindicated. Sequential Compression Devices Ordered.    Mobility:   Basic Mobility Inpatient Raw Score: 17  JH-HLM Goal: 5: Stand one or more mins  JH-HLM Achieved: 5: Stand (1 or more minutes)  JH-HLM Goal achieved. Continue to encourage appropriate mobility.    Patient Centered Rounds: I performed bedside rounds with nursing staff today.   Discussions with Specialists or Other Care Team Provider: urology, nephrology     Education and Discussions with Family / Patient: Updated  (son) via phone.    Current Length of Stay: 5 day(s)  Current Patient Status: Inpatient   Certification Statement: The patient will continue to require additional inpatient hospital stay due to urinary  retention, gross hematuria  Discharge Plan: Anticipate discharge in 24-48 hrs to home.    Code Status: Level 3 - DNAR and DNI    Subjective   Patient seen today at bedside.  He is feeling well.  No active complaints.  No chest pain or tightness, SOB or cough, dizziness or light headedness, N/V, Diarrhea of constipation.     Tolerating oral diet.       Objective :  HR:  [60-72] 60  BP: (123-127)/(56-66) 123/66  SpO2:  [98 %] 98 %  O2 Device: None (Room air)    Body mass index is 21.29 kg/m².     Input and Output Summary (last 24 hours):     Intake/Output Summary (Last 24 hours) at 12/13/2024 0936  Last data filed at 12/13/2024 0547  Gross per 24 hour   Intake 270 ml   Output 1400 ml   Net -1130 ml       Physical Exam  Vitals and nursing note reviewed.   Constitutional:       General: He is not in acute distress.     Appearance: Normal appearance.   HENT:      Head: Normocephalic and atraumatic.      Mouth/Throat:      Mouth: Mucous membranes are moist.   Eyes:      Conjunctiva/sclera: Conjunctivae normal.      Pupils: Pupils are equal, round, and reactive to light.   Cardiovascular:      Rate and Rhythm: Normal rate and regular rhythm.      Pulses: Normal pulses.           Carotid pulses are 2+ on the right side and 2+ on the left side.       Radial pulses are 2+ on the right side and 2+ on the left side.        Dorsalis pedis pulses are 2+ on the right side and 2+ on the left side.      Heart sounds: Normal heart sounds, S1 normal and S2 normal. No murmur heard.  Pulmonary:      Effort: No tachypnea, bradypnea or accessory muscle usage.      Breath sounds: Normal breath sounds and air entry. No decreased breath sounds, wheezing, rhonchi or rales.   Abdominal:      General: Abdomen is flat. Bowel sounds are normal. There is no distension.      Palpations: Abdomen is soft.      Tenderness: There is no abdominal tenderness.      Comments: Dupont's catheter noted in place.  Canister with light red-colored urine with  precipitation.no clots noted    Musculoskeletal:      Right lower leg: No edema.      Left lower leg: No edema.   Neurological:      Mental Status: He is alert and oriented to person, place, and time. Mental status is at baseline.   Psychiatric:         Mood and Affect: Mood normal.         Behavior: Behavior normal.           Lines/Drains:  Lines/Drains/Airways       Active Status       Name Placement date Placement time Site Days    Urethral Catheter 16 Fr. 12/09/24  1800  --  3                  Urinary Catheter:  Goal for removal: Voiding trial when ambulation improves                 Lab Results: I have reviewed the following results:   Results from last 7 days   Lab Units 12/13/24  0536   WBC Thousand/uL 4.39   HEMOGLOBIN g/dL 9.6*   HEMATOCRIT % 29.8*   PLATELETS Thousands/uL 72*   SEGS PCT % 59   LYMPHO PCT % 24   MONO PCT % 15*   EOS PCT % 2     Results from last 7 days   Lab Units 12/13/24  0536 12/10/24  0513 12/09/24  0453   SODIUM mmol/L 133*   < > 136   POTASSIUM mmol/L 4.3   < > 4.0   CHLORIDE mmol/L 95*   < > 100   CO2 mmol/L 32   < > 28   BUN mg/dL 60*   < > 60*   CREATININE mg/dL 2.17*   < > 1.94*   ANION GAP mmol/L 6   < > 8   CALCIUM mg/dL 8.0*   < > 8.0*   ALBUMIN g/dL  --   --  3.3*   TOTAL BILIRUBIN mg/dL  --   --  0.74   ALK PHOS U/L  --   --  122*   ALT U/L  --   --  22   AST U/L  --   --  22   GLUCOSE RANDOM mg/dL 53*   < > 36*    < > = values in this interval not displayed.         Results from last 7 days   Lab Units 12/13/24  0832 12/13/24  0800 12/12/24  2132 12/12/24  1711 12/12/24  1636 12/12/24  1102 12/12/24  0719 12/11/24  2120 12/11/24  1558 12/11/24  1133 12/11/24  0713 12/10/24  2034   POC GLUCOSE mg/dl 101 50* 175* 73 53* 255* 247* 242* 198* 183* 80 187*     Results from last 7 days   Lab Units 12/07/24  1439   HEMOGLOBIN A1C % 7.7*           Recent Cultures (last 7 days):   Results from last 7 days   Lab Units 12/10/24  1606   URINE CULTURE  >100,000 cfu/ml Proteus mirabilis*        Imaging Results Review: No pertinent imaging studies reviewed.  Other Study Results Review: No additional pertinent studies reviewed.    Last 24 Hours Medication List:     Current Facility-Administered Medications:     acetaminophen (TYLENOL) tablet 650 mg, Q6H PRN    allopurinol (ZYLOPRIM) tablet 100 mg, BID    ascorbic acid (VITAMIN C) tablet 500 mg, Daily    atorvastatin (LIPITOR) tablet 40 mg, Daily With Dinner    Cholecalciferol (VITAMIN D3) tablet 2,000 Units, Daily    docusate sodium (COLACE) capsule 100 mg, BID    famotidine (PEPCID) tablet 10 mg, HS    ferrous sulfate tablet 325 mg, Daily With Breakfast    insulin lispro (HumALOG/ADMELOG) 100 units/mL subcutaneous injection 1-5 Units, TID AC **AND** Fingerstick Glucose (POCT), TID AC    insulin lispro (HumALOG/ADMELOG) 100 units/mL subcutaneous injection 1-5 Units, HS    latanoprost (XALATAN) 0.005 % ophthalmic solution 1 drop, HS    nystatin (MYCOSTATIN) powder, BID    polyethylene glycol (MIRALAX) packet 17 g, Daily    senna (SENOKOT) tablet 17.2 mg, HS    sodium chloride (OCEAN) 0.65 % nasal spray 1 spray, Q1H PRN    tamsulosin (FLOMAX) capsule 0.4 mg, Daily With Dinner    timolol (TIMOPTIC) 0.5 % ophthalmic solution 1 drop, Daily    torsemide (DEMADEX) tablet 40 mg, BID (diuretic)    zinc sulfate (ZINCATE) capsule 220 mg, Daily    Administrative Statements   Today, Patient Was Seen By: Yen Jay MD  I have spent a total time of 30 minutes in caring for this patient on the day of the visit/encounter including Diagnostic results, Prognosis, Risks and benefits of tx options, and Instructions for management.    **Please Note: This note may have been constructed using a voice recognition system.**

## 2024-12-13 NOTE — ASSESSMENT & PLAN NOTE
-Outpatient nephrologist Dr. 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  Lasix was increased to IV 40 mg twice daily on 12/8  - 12/9-creatinine stable 1.9 mg/dL.  Had adequate urine output with increased Lasix yesterday.  Patient still with pulmonary edema on exam. Na, K, bicarb appropriate. Hypoglycemia improved with management by primary team  - 12/10-creatinine stable 1.9.  Sodium, potassium, bicarbonate, phosphorus and magnesium stable and appropriate.  Chest x-ray with persistent pulmonary edema and pleural effusions and persistent bibasilar airspace opacity  - 12/11 -creatinine stable 1.9.  Sodium borderline low 133 will monitor for now.  Potassium is okay.  Will change IV Lasix to torsemide  - 12/12-creatinine relatively stable at 2.01.  Sodium level appropriate when corrected for hyperglycemia.  Magnesium phosphorus and potassium are appropriate.  - 12/13-creatinine slightly higher at 2.1.  Hypoglycemia treated by primary team.  Sodium borderline low but monitoring for now as it is okay 133.  Magnesium and phosphorus are appropriate.  Patient's creatinine has slightly increased but this is in the setting of adequate diuresis and appears to still be within baseline.  Urinalysis is nitrite positive and WBC and RBC but difficult to interpret as it is a Dupont specimen    Plan  - Continue torsemide 40 mg twice daily  - BMP in a.m.  - I/os; avoid nephrotoxic agents  - Avoid hypotension  - Lower extremity PVD-currently asymptomatic but does have wounds on lower extremities.  Further management per primary team.  No DVT.  - Will likely need to accept a higher creatinine in euvolemic state

## 2024-12-13 NOTE — PROGRESS NOTES
Progress Note - Nephrology   Name: Yao Tipton 84 y.o. male I MRN: [id number]  Unit/Bed#: 4 [facility] 422-01 I Date of Admission: 12/7/2024   Date of Service: 12/13/2024 I Hospital Day: 5    Assessment & Plan  Chronic kidney disease, stage 4 (severe) ([facility])  -Outpatient nephrologist Dr. 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  Lasix was increased to IV 40 mg twice daily on 12/8  - 12/9-creatinine stable 1.9 mg/dL.  Had adequate urine output with increased Lasix yesterday.  Patient still with pulmonary edema on exam. Na, K, bicarb appropriate. Hypoglycemia improved with management by primary team  - 12/10-creatinine stable 1.9.  Sodium, potassium, bicarbonate, phosphorus and magnesium stable and appropriate.  Chest x-ray with persistent pulmonary edema and pleural effusions and persistent bibasilar airspace opacity  - 12/11 -creatinine stable 1.9.  Sodium borderline low 133 will monitor for now.  Potassium is okay.  Will change IV Lasix to torsemide  - 12/12-creatinine relatively stable at 2.01.  Sodium level appropriate when corrected for hyperglycemia.  Magnesium phosphorus and potassium are appropriate.  - 12/13-creatinine slightly higher at 2.1.  Hypoglycemia treated by primary team.  Sodium borderline low but monitoring for now as it is okay 133.  Magnesium and phosphorus are appropriate.  Patient's creatinine has slightly increased but this is in the setting of adequate diuresis and appears to still be within baseline.  Urinalysis is nitrite positive and WBC and RBC but difficult to interpret as it is a Dupont specimen    Plan  - Continue torsemide 40 mg twice daily  - BMP in a.m.  - I/os; avoid nephrotoxic agents  - Avoid hypotension  -  Lower extremity PVD-currently asymptomatic but does have wounds on lower extremities.  Further management per primary team.  No DVT.  - Will likely need to accept a higher creatinine in euvolemic state    Acute on chronic combined systolic and diastolic congestive heart failure (HCC)  -Noted weight increase from April  - CT chest small R pl effusion, mod L pl effusion, atelectasis  - To note has also high salt intake during the holidays  - Current creatinine is below baseline  - Lasix was increased 12/8 and patient is tolerating IV Lasix well.  Changed to oral torsemide on 12/11 and remains euvolemic 12/13  - See discussion above    Essential hypertension  Avoid hypotension and monitoring closely with diuresis on board  Chronic atrial fibrillation (HCC)  -Per primary team  Thrombocytopenia ([facility])  -Chronic thrombocytopenia.  Monitor per primary team  BPH (benign prostatic hyperplasia)  -On tamsulosin  - Continue urinary retention protocol-required straight catheterization which revealed 600 cc urine output  Open wound of both lower extremities  -- Continue diuresis-wound management per primary team  Acute urinary retention  - Urology on board.  - Had hematuria.  Being evaluated by urology team.  May be traumatic Dupont insertion    I have reviewed the nephrology recommendations including continue torsemide at current dose, with primary team attending, and we are in agreement with renal plan including the information outlined above.     Subjective   Brief History of Admission - 84-year-old male with a past medical history of CKD stage IV, CHF, A-fib, hypertension, diabetes, BPH who initially presents with exertional shortness of breath. Was advised to come to the ER by his son. Nephrology is on board for CKD.  Patient was diuresed and volume state improved.  Creatinine remained stable.  Course complicated by hematuria.  Possibly in setting of traumatic Dupont insertion  .   Denies complaints.  Blood pressure is 120  systolic.  Weight is bed scale not accurate but slightly higher.  Urine output 1.4 L yesterday.      Objective :  HR:  [60-72] 60  BP: (123-127)/(56-66) 123/66  SpO2:  [98 %] 98 %  O2 Device: None (Room air)    Current Weight: Weight - Scale: 63.5 kg (140 lb)  First Weight: Weight - Scale: 66.7 kg (147 lb)  I/O         12/11 0701 12/12 0700 12/12 0701 12/13 0700 12/13 0701 12/14 0700    P.O. 1020 260     I.V. (mL/kg)  10 (0.2)     Total Intake(mL/kg) 1020 (16.3) 270 (4.3)     Urine (mL/kg/hr) 1400 (0.9) 1400 (0.9)     Stool  0     Total Output 1400 1400     Net -380 -1130            Unmeasured Stool Occurrence  1 x 1 x          Physical Exam  General: NAD  Skin: no rash  Eyes: anicteric sclera  ENT: moist mucous membrane  Neck: supple  Chest: CTA b/l, no ronchii, no wheeze, no rubs, no rales  CVS: s1s2, no murmur, no gallop, no rub  Abdomen: soft, nontender, nl sounds  Extremities: Trace, improving edema LE b/l, lower extremity wounds wrapped  : no victor  Neuro: AAOX3  Psych: normal affect    Medications:    Current Facility-Administered Medications:     acetaminophen (TYLENOL) tablet 650 mg, 650 mg, Oral, Q6H PRN, Cuimalcomn Marianarik, CRNP    allopurinol (ZYLOPRIM) tablet 100 mg, 100 mg, Oral, BID, Cuiyin Yurik, CRNP, 100 mg at 12/13/24 0820    ascorbic acid (VITAMIN C) tablet 500 mg, 500 mg, Oral, Daily, Cuiyin Yurik, CRNP, 500 mg at 12/13/24 0819    atorvastatin (LIPITOR) tablet 40 mg, 40 mg, Oral, Daily With Dinner, Cuiyin Marianarik, CRNP, 40 mg at 12/12/24 1759    Cholecalciferol (VITAMIN D3) tablet 2,000 Units, 2,000 Units, Oral, Daily, Cuiyin Marianarik, CRNP, 2,000 Units at 12/13/24 0820    docusate sodium (COLACE) capsule 100 mg, 100 mg, Oral, BID, Kenney Jeronimo DO, 100 mg at 12/13/24 0820    famotidine (PEPCID) tablet 10 mg, 10 mg, Oral, HS, SUSANA Dunne, 10 mg at 12/12/24 2253    ferrous sulfate tablet 325 mg, 325 mg, Oral, Daily With Breakfast, SUSANA Dunne, 325 mg at 12/13/24 0820    insulin lispro  (HumALOG/ADMELOG) 100 units/mL subcutaneous injection 1-5 Units, 1-5 Units, Subcutaneous, TID AC, 1 Units at 12/13/24 1147 **AND** Fingerstick Glucose (POCT), , , TID AC, Veronica Sellers, SUSANA    insulin lispro (HumALOG/ADMELOG) 100 units/mL subcutaneous injection 1-5 Units, 1-5 Units, Subcutaneous, HS, SUSANA Dunne, 1 Units at 12/12/24 2255    latanoprost (XALATAN) 0.005 % ophthalmic solution 1 drop, 1 drop, Both Eyes, HS, CARROL DunneNP, 1 drop at 12/12/24 2255    nystatin (MYCOSTATIN) powder, , Topical, BID, Kenney Jeronimo DO, Given at 12/13/24 0824    polyethylene glycol (MIRALAX) packet 17 g, 17 g, Oral, Daily, Kenney Jeronimo DO, 17 g at 12/12/24 0922    senna (SENOKOT) tablet 17.2 mg, 2 tablet, Oral, HS, Kenney Jeronimo DO, 17.2 mg at 12/12/24 2254    sodium chloride (OCEAN) 0.65 % nasal spray 1 spray, 1 spray, Each Nare, Q1H PRN, SUSANA Dunne    tamsulosin (FLOMAX) capsule 0.4 mg, 0.4 mg, Oral, Daily With Dinner, SUSANA Dunne, 0.4 mg at 12/12/24 1759    timolol (TIMOPTIC) 0.5 % ophthalmic solution 1 drop, 1 drop, Both Eyes, Daily, SUSANA Dunne, 1 drop at 12/13/24 0825    torsemide (DEMADEX) tablet 40 mg, 40 mg, Oral, BID (diuretic), Heidi Sweeney MD, 40 mg at 12/13/24 0820    zinc sulfate (ZINCATE) capsule 220 mg, 220 mg, Oral, Daily, SUSANA Dunne, 220 mg at 12/13/24 0820      Lab Results: I have reviewed the following results:  Results from last 7 days   Lab Units 12/13/24  0536 12/12/24  0448 12/11/24  0500 12/10/24  1050 12/10/24  0513 12/09/24  0453 12/08/24  0434 12/07/24  1439   WBC Thousand/uL 4.39 6.17 4.10* 6.69  --  5.22 4.19* 6.13   HEMOGLOBIN g/dL 9.6* 10.2* 9.8* 9.8*  --  9.7* 9.9* 10.9*   HEMATOCRIT % 29.8* 32.7* 30.4* 30.8*  --  30.7* 30.5* 34.0*   PLATELETS Thousands/uL 72* 81* 84* 85*  --  89* 93* 100*   POTASSIUM mmol/L 4.3 4.6 4.0  --  4.0 4.0 4.3 4.6   CHLORIDE mmol/L 95* 97 97  --  99 100 100 97   CO2 mmol/L 32 28 29  --  31 28 32 27   BUN  "mg/dL 60* 55* 56*  --  57* 60* 65* 63*   CREATININE mg/dL 2.17* 2.01* 1.96*  --  1.95* 1.94* 1.95* 1.97*   CALCIUM mg/dL 8.0* 8.3* 7.9*  --  8.0* 8.0* 8.3* 8.4   MAGNESIUM mg/dL 2.1 2.2 2.0  --  2.1  --  2.2 2.2   PHOSPHORUS mg/dL 2.9 3.0 3.4  --  3.2  --   --   --    ALBUMIN g/dL  --   --   --   --   --  3.3*  --  3.3*       Administrative Statements     Portions of the record may have been created with voice recognition software. Occasional wrong word or \"sound a like\" substitutions may have occurred due to the inherent limitations of voice recognition software. Read the chart carefully and recognize, using context, where substitutions have occurred.If you have any questions, please contact the dictating provider.  "

## 2024-12-13 NOTE — ASSESSMENT & PLAN NOTE
Noted on 12/10 during rounds most probably traumatic secondary to Dupont's catheter insertion  No clots noted    -Hold Eliquis Day3#   -urine clearing up . Plan to restart eliquis in the next 24 hrs  -Consult urology; voiding trial when appropriate per urology recs

## 2024-12-14 LAB
ANION GAP SERPL CALCULATED.3IONS-SCNC: 5 MMOL/L (ref 4–13)
BUN SERPL-MCNC: 57 MG/DL (ref 5–25)
CALCIUM SERPL-MCNC: 8.1 MG/DL (ref 8.4–10.2)
CHLORIDE SERPL-SCNC: 96 MMOL/L (ref 96–108)
CO2 SERPL-SCNC: 33 MMOL/L (ref 21–32)
CREAT SERPL-MCNC: 2.11 MG/DL (ref 0.6–1.3)
ERYTHROCYTE [DISTWIDTH] IN BLOOD BY AUTOMATED COUNT: 16.4 % (ref 11.6–15.1)
GFR SERPL CREATININE-BSD FRML MDRD: 27 ML/MIN/1.73SQ M
GLUCOSE SERPL-MCNC: 119 MG/DL (ref 65–140)
GLUCOSE SERPL-MCNC: 121 MG/DL (ref 65–140)
GLUCOSE SERPL-MCNC: 224 MG/DL (ref 65–140)
GLUCOSE SERPL-MCNC: 229 MG/DL (ref 65–140)
GLUCOSE SERPL-MCNC: 255 MG/DL (ref 65–140)
HCT VFR BLD AUTO: 32.7 % (ref 36.5–49.3)
HGB BLD-MCNC: 10.7 G/DL (ref 12–17)
MAGNESIUM SERPL-MCNC: 2.1 MG/DL (ref 1.9–2.7)
MCH RBC QN AUTO: 32.4 PG (ref 26.8–34.3)
MCHC RBC AUTO-ENTMCNC: 32.7 G/DL (ref 31.4–37.4)
MCV RBC AUTO: 99 FL (ref 82–98)
PHOSPHATE SERPL-MCNC: 3.1 MG/DL (ref 2.3–4.1)
PLATELET # BLD AUTO: 77 THOUSANDS/UL (ref 149–390)
PMV BLD AUTO: 10.2 FL (ref 8.9–12.7)
POTASSIUM SERPL-SCNC: 4.2 MMOL/L (ref 3.5–5.3)
RBC # BLD AUTO: 3.3 MILLION/UL (ref 3.88–5.62)
SODIUM SERPL-SCNC: 134 MMOL/L (ref 135–147)
WBC # BLD AUTO: 5.03 THOUSAND/UL (ref 4.31–10.16)

## 2024-12-14 PROCEDURE — 82948 REAGENT STRIP/BLOOD GLUCOSE: CPT

## 2024-12-14 PROCEDURE — 84100 ASSAY OF PHOSPHORUS: CPT | Performed by: INTERNAL MEDICINE

## 2024-12-14 PROCEDURE — 85027 COMPLETE CBC AUTOMATED: CPT | Performed by: INTERNAL MEDICINE

## 2024-12-14 PROCEDURE — 99232 SBSQ HOSP IP/OBS MODERATE 35: CPT | Performed by: STUDENT IN AN ORGANIZED HEALTH CARE EDUCATION/TRAINING PROGRAM

## 2024-12-14 PROCEDURE — 80048 BASIC METABOLIC PNL TOTAL CA: CPT | Performed by: INTERNAL MEDICINE

## 2024-12-14 PROCEDURE — 99232 SBSQ HOSP IP/OBS MODERATE 35: CPT | Performed by: INTERNAL MEDICINE

## 2024-12-14 PROCEDURE — 83735 ASSAY OF MAGNESIUM: CPT | Performed by: INTERNAL MEDICINE

## 2024-12-14 RX ADMIN — TAMSULOSIN HYDROCHLORIDE 0.4 MG: 0.4 CAPSULE ORAL at 17:31

## 2024-12-14 RX ADMIN — INSULIN LISPRO 2 UNITS: 100 INJECTION, SOLUTION INTRAVENOUS; SUBCUTANEOUS at 17:32

## 2024-12-14 RX ADMIN — FAMOTIDINE 10 MG: 20 TABLET, FILM COATED ORAL at 21:22

## 2024-12-14 RX ADMIN — DOCUSATE SODIUM 100 MG: 100 CAPSULE, LIQUID FILLED ORAL at 09:32

## 2024-12-14 RX ADMIN — APIXABAN 2.5 MG: 2.5 TABLET, FILM COATED ORAL at 09:40

## 2024-12-14 RX ADMIN — ATORVASTATIN CALCIUM 40 MG: 40 TABLET, FILM COATED ORAL at 17:31

## 2024-12-14 RX ADMIN — ALLOPURINOL 100 MG: 100 TABLET ORAL at 09:32

## 2024-12-14 RX ADMIN — DOCUSATE SODIUM 100 MG: 100 CAPSULE, LIQUID FILLED ORAL at 17:31

## 2024-12-14 RX ADMIN — ALLOPURINOL 100 MG: 100 TABLET ORAL at 17:31

## 2024-12-14 RX ADMIN — NYSTATIN: 100000 POWDER TOPICAL at 17:34

## 2024-12-14 RX ADMIN — LATANOPROST 1 DROP: 50 SOLUTION OPHTHALMIC at 21:22

## 2024-12-14 RX ADMIN — TORSEMIDE 40 MG: 20 TABLET ORAL at 17:34

## 2024-12-14 RX ADMIN — TORSEMIDE 40 MG: 20 TABLET ORAL at 09:32

## 2024-12-14 RX ADMIN — APIXABAN 2.5 MG: 2.5 TABLET, FILM COATED ORAL at 17:31

## 2024-12-14 RX ADMIN — TIMOLOL MALEATE 1 DROP: 5 SOLUTION OPHTHALMIC at 09:33

## 2024-12-14 RX ADMIN — POLYETHYLENE GLYCOL 3350 17 G: 17 POWDER, FOR SOLUTION ORAL at 09:32

## 2024-12-14 RX ADMIN — INSULIN LISPRO 1 UNITS: 100 INJECTION, SOLUTION INTRAVENOUS; SUBCUTANEOUS at 11:57

## 2024-12-14 RX ADMIN — INSULIN LISPRO 2 UNITS: 100 INJECTION, SOLUTION INTRAVENOUS; SUBCUTANEOUS at 21:22

## 2024-12-14 RX ADMIN — SENNOSIDES 17.2 MG: 8.6 TABLET, FILM COATED ORAL at 21:22

## 2024-12-14 RX ADMIN — Medication 2000 UNITS: at 09:32

## 2024-12-14 RX ADMIN — OXYCODONE HYDROCHLORIDE AND ACETAMINOPHEN 500 MG: 500 TABLET ORAL at 09:32

## 2024-12-14 RX ADMIN — FERROUS SULFATE TAB 325 MG (65 MG ELEMENTAL FE) 325 MG: 325 (65 FE) TAB at 09:40

## 2024-12-14 RX ADMIN — ZINC SULFATE 220 MG (50 MG) CAPSULE 220 MG: CAPSULE at 09:32

## 2024-12-14 RX ADMIN — NYSTATIN: 100000 POWDER TOPICAL at 09:33

## 2024-12-14 NOTE — ASSESSMENT & PLAN NOTE
Wt Readings from Last 3 Encounters:   12/14/24 62.5 kg (137 lb 12.8 oz)   08/19/24 67.2 kg (148 lb 3.2 oz)   08/08/24 66.7 kg (147 lb)     History of CKD 4 diabetes mellitus BPH atrial fibrillation and combined CHF who presents to the hospital for worsening leg edema  Nephrology and Cardiology consulted.  There is history of dietary indiscretion including consumption of salt food  Transition from IV Lasix to PO torsemide per nephrology recommendations.  Continue on torsemide 40 mg twice daily  Continue to monitor I/Os Daily weight.    Intake/Output Summary (Last 24 hours) at 12/14/2024 0949  Last data filed at 12/14/2024 0559  Gross per 24 hour   Intake 240 ml   Output 1800 ml   Net -1560 ml

## 2024-12-14 NOTE — ASSESSMENT & PLAN NOTE
Chronic atrial fibrillation bradycardia.  Monitor on telemetry.  Anticoagulation: Restart Eliquis 12/14/2024

## 2024-12-14 NOTE — PROGRESS NOTES
Progress Note - Nephrology   Name: Yao Tipton 84 y.o. male I MRN: [id number]  Unit/Bed#: 4 [facility] 422-01 I Date of Admission: 12/7/2024   Date of Service: 12/14/2024 I Hospital Day: 6    Assessment & Plan  Chronic kidney disease, stage 4 (severe) ([facility])  -Outpatient nephrologist Dr. 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  Lasix was increased to IV 40 mg twice daily on 12/8  - 12/9-creatinine stable 1.9 mg/dL.  Had adequate urine output with increased Lasix yesterday.  Patient still with pulmonary edema on exam. Na, K, bicarb appropriate. Hypoglycemia improved with management by primary team  - 12/10-creatinine stable 1.9.  Sodium, potassium, bicarbonate, phosphorus and magnesium stable and appropriate.  Chest x-ray with persistent pulmonary edema and pleural effusions and persistent bibasilar airspace opacity  - 12/11 -creatinine stable 1.9.  Sodium borderline low 133 will monitor for now.  Potassium is okay.  Will change IV Lasix to torsemide  - 12/12-creatinine relatively stable at 2.01.  Sodium level appropriate when corrected for hyperglycemia.  Magnesium phosphorus and potassium are appropriate.  - 12/13-creatinine slightly higher at 2.1.  Hypoglycemia treated by primary team.  Sodium borderline low but monitoring for now as it is okay 133.  Magnesium and phosphorus are appropriate.  Patient's creatinine has slightly increased but this is in the setting of adequate diuresis and appears to still be within baseline.  Urinalysis is nitrite positive and WBC and RBC but difficult to interpret as it is a Dupont specimen  - 12/14-creatinine stable 2.1.  Sodium, potassium, bicarbonate, phosphorus, magnesium appropriate.    Plan  - Continue  torsemide 40 mg twice daily  - BMP in a.m.  - I/os; avoid nephrotoxic agents  - Avoid hypotension  - Lower extremity PVD-currently asymptomatic but does have wounds on lower extremities.  Further management per primary team.  No DVT.  - Will likely need to accept a higher creatinine in euvolemic state  - Please call the renal team if questions or issues arise over the weekend    Acute on chronic combined systolic and diastolic congestive heart failure (HCC)  -Noted weight increase from April  - CT chest small R pl effusion, mod L pl effusion, atelectasis  - To note has also high salt intake during the holidays  - Current creatinine is below baseline  - Lasix was increased 12/8 and patient is tolerating IV Lasix well.  Changed to oral torsemide on 12/11 and remains euvolemic 12/13  - See discussion above    Essential hypertension  Avoid hypotension and monitoring closely with diuresis on board  Chronic atrial fibrillation (HCC)  -Per primary team  Thrombocytopenia (HCC)  -Chronic thrombocytopenia.  Monitor per primary team  BPH (benign prostatic hyperplasia)  -On tamsulosin  - Continue urinary retention protocol-required straight catheterization which revealed 600 cc urine output  Open wound of both lower extremities  -- Continue diuresis-wound management per primary team  Acute urinary retention  - Urology on board.  - Had hematuria.  Being evaluated by urology team.  May be traumatic Dupont insertion    I have reviewed the nephrology recommendations including continue current dose of diuretic., with primary team attending, and we are in agreement with renal plan including the information outlined above.     Subjective   Brief History of Admission - 84-year-old male with a past medical history of CKD stage IV, CHF, A-fib, hypertension, diabetes, BPH who initially presents with exertional shortness of breath. Was advised to come to the ER by his son. Nephrology is on board for CKD. Patient was diuresed and volume  state improved. Creatinine remained stable. Course complicated by hematuria. Possibly in setting of traumatic Victor insertion     Patient denies complaints.  Blood pressure is 120 systolic.  Afebrile.    Objective :  Temp:  [97.1 °F (36.2 °C)] 97.1 °F (36.2 °C)  HR:  [69-79] 77  BP: (121-125)/(66-75) 122/75  Resp:  [16-18] 18  SpO2:  [96 %-98 %] 96 %    Current Weight: Weight - Scale: 62.5 kg (137 lb 12.8 oz)  First Weight: Weight - Scale: 66.7 kg (147 lb)  I/O         12/12 0701 12/13 0700 12/13 0701 12/14 0700 12/14 0701  12/15 0700    P.O. 260 480     I.V. (mL/kg) 10 (0.2)      Total Intake(mL/kg) 270 (4.3) 480 (7.7)     Urine (mL/kg/hr) 1400 (0.9) 1800 (1.2)     Stool 0 0     Total Output 1400 1800     Net -1130 -1320            Unmeasured Stool Occurrence 1 x 1 x           Physical Exam  General: NAD  Skin: no rash  Eyes: anicteric sclera  ENT: moist mucous membrane  Neck: supple  Chest: CTA b/l, no ronchii, no wheeze, no rubs, no rales  CVS: s1s2, no murmur, no gallop, no rub  Abdomen: soft, nontender, nl sounds  Extremities: Trace unchanged edema LE b/l  : Positive victor with clear yellow urine  Neuro: AAOX3  Psych: normal affect    Medications:    Current Facility-Administered Medications:     acetaminophen (TYLENOL) tablet 650 mg, 650 mg, Oral, Q6H PRN, SUSANA Dunne    allopurinol (ZYLOPRIM) tablet 100 mg, 100 mg, Oral, BID, SUSANA Dunne, 100 mg at 12/14/24 0932    apixaban (ELIQUIS) tablet 2.5 mg, 2.5 mg, Oral, BID, Artemio Pacheco DO, 2.5 mg at 12/14/24 0940    ascorbic acid (VITAMIN C) tablet 500 mg, 500 mg, Oral, Daily, SUSANA Dunne, 500 mg at 12/14/24 0932    atorvastatin (LIPITOR) tablet 40 mg, 40 mg, Oral, Daily With Dinner, SUSANA Dunne, 40 mg at 12/13/24 1706    Cholecalciferol (VITAMIN D3) tablet 2,000 Units, 2,000 Units, Oral, Daily, SUSANA Dunne, 2,000 Units at 12/14/24 0932    docusate sodium (COLACE) capsule 100 mg, 100 mg, Oral, BID, Kenney Peterson  DO Kandace, 100 mg at 12/14/24 0932    famotidine (PEPCID) tablet 10 mg, 10 mg, Oral, HS, SUSANA Dunne, 10 mg at 12/13/24 2130    ferrous sulfate tablet 325 mg, 325 mg, Oral, Daily With Breakfast, SUSANA Dunne, 325 mg at 12/14/24 0940    insulin lispro (HumALOG/ADMELOG) 100 units/mL subcutaneous injection 1-5 Units, 1-5 Units, Subcutaneous, TID AC, 1 Units at 12/13/24 1709 **AND** Fingerstick Glucose (POCT), , , TID AC, SUSANA Dunne    insulin lispro (HumALOG/ADMELOG) 100 units/mL subcutaneous injection 1-5 Units, 1-5 Units, Subcutaneous, HS, SSUANA Dunne, 3 Units at 12/13/24 2138    latanoprost (XALATAN) 0.005 % ophthalmic solution 1 drop, 1 drop, Both Eyes, HS, SUSANA Dunne, 1 drop at 12/13/24 2144    nystatin (MYCOSTATIN) powder, , Topical, BID, Kenney Jeronimo DO, Given at 12/14/24 0933    polyethylene glycol (MIRALAX) packet 17 g, 17 g, Oral, Daily, Kenney Jeronimo DO, 17 g at 12/14/24 0932    senna (SENOKOT) tablet 17.2 mg, 2 tablet, Oral, HS, Kenney Jeronimo DO, 17.2 mg at 12/13/24 2130    sodium chloride (OCEAN) 0.65 % nasal spray 1 spray, 1 spray, Each Nare, Q1H PRN, SUSANA Dunne    tamsulosin (FLOMAX) capsule 0.4 mg, 0.4 mg, Oral, Daily With Dinner, SUSANA Dunne, 0.4 mg at 12/13/24 1706    timolol (TIMOPTIC) 0.5 % ophthalmic solution 1 drop, 1 drop, Both Eyes, Daily, SUSANA Dunne, 1 drop at 12/14/24 0933    torsemide (DEMADEX) tablet 40 mg, 40 mg, Oral, BID (diuretic), Heidi Sweeney MD, 40 mg at 12/14/24 0932    zinc sulfate (ZINCATE) capsule 220 mg, 220 mg, Oral, Daily, SUSANA Dunne, 220 mg at 12/14/24 0932      Lab Results: I have reviewed the following results:  Results from last 7 days   Lab Units 12/14/24  0557 12/13/24  0536 12/12/24  0448 12/11/24  0500 12/10/24  1050 12/10/24  0513 12/09/24  0453 12/08/24  0434 12/07/24  1439   WBC Thousand/uL 5.03 4.39 6.17 4.10* 6.69  --  5.22 4.19* 6.13   HEMOGLOBIN g/dL 10.7* 9.6* 10.2* 9.8* 9.8*   "--  9.7* 9.9* 10.9*   HEMATOCRIT % 32.7* 29.8* 32.7* 30.4* 30.8*  --  30.7* 30.5* 34.0*   PLATELETS Thousands/uL 77* 72* 81* 84* 85*  --  89* 93* 100*   POTASSIUM mmol/L 4.2 4.3 4.6 4.0  --  4.0 4.0 4.3 4.6   CHLORIDE mmol/L 96 95* 97 97  --  99 100 100 97   CO2 mmol/L 33* 32 28 29  --  31 28 32 27   BUN mg/dL 57* 60* 55* 56*  --  57* 60* 65* 63*   CREATININE mg/dL 2.11* 2.17* 2.01* 1.96*  --  1.95* 1.94* 1.95* 1.97*   CALCIUM mg/dL 8.1* 8.0* 8.3* 7.9*  --  8.0* 8.0* 8.3* 8.4   MAGNESIUM mg/dL 2.1 2.1 2.2 2.0  --  2.1  --  2.2 2.2   PHOSPHORUS mg/dL 3.1 2.9 3.0 3.4  --  3.2  --   --   --    ALBUMIN g/dL  --   --   --   --   --   --  3.3*  --  3.3*       Administrative Statements     Portions of the record may have been created with voice recognition software. Occasional wrong word or \"sound a like\" substitutions may have occurred due to the inherent limitations of voice recognition software. Read the chart carefully and recognize, using context, where substitutions have occurred.If you have any questions, please contact the dictating provider.  "

## 2024-12-14 NOTE — ASSESSMENT & PLAN NOTE
Baseline creatinine closer to 2.0.  Nephrology consulted    Results from last 7 days   Lab Units 12/14/24  0557 12/13/24  0536 12/12/24  0448 12/11/24  0500 12/10/24  0513 12/09/24  0453 12/08/24  0434 12/07/24  1439   BUN mg/dL 57* 60* 55* 56* 57* 60* 65* 63*   CREATININE mg/dL 2.11* 2.17* 2.01* 1.96* 1.95* 1.94* 1.95* 1.97*   EGFR ml/min/1.73sq m 27 26 29 30 30 30 30 29   Renal function appears baseline

## 2024-12-14 NOTE — ASSESSMENT & PLAN NOTE
-Outpatient nephrologist Dr. 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  Lasix was increased to IV 40 mg twice daily on 12/8  - 12/9-creatinine stable 1.9 mg/dL.  Had adequate urine output with increased Lasix yesterday.  Patient still with pulmonary edema on exam. Na, K, bicarb appropriate. Hypoglycemia improved with management by primary team  - 12/10-creatinine stable 1.9.  Sodium, potassium, bicarbonate, phosphorus and magnesium stable and appropriate.  Chest x-ray with persistent pulmonary edema and pleural effusions and persistent bibasilar airspace opacity  - 12/11 -creatinine stable 1.9.  Sodium borderline low 133 will monitor for now.  Potassium is okay.  Will change IV Lasix to torsemide  - 12/12-creatinine relatively stable at 2.01.  Sodium level appropriate when corrected for hyperglycemia.  Magnesium phosphorus and potassium are appropriate.  - 12/13-creatinine slightly higher at 2.1.  Hypoglycemia treated by primary team.  Sodium borderline low but monitoring for now as it is okay 133.  Magnesium and phosphorus are appropriate.  Patient's creatinine has slightly increased but this is in the setting of adequate diuresis and appears to still be within baseline.  Urinalysis is nitrite positive and WBC and RBC but difficult to interpret as it is a Dupont specimen  - 12/14-creatinine stable 2.1.  Sodium, potassium, bicarbonate, phosphorus, magnesium appropriate.    Plan  - Continue torsemide 40 mg twice daily  - BMP in a.m.  - I/os; avoid nephrotoxic agents  - Avoid hypotension  - Lower extremity PVD-currently asymptomatic but does have wounds on lower extremities.  Further management per primary team.  No DVT.  - Will likely need to  accept a higher creatinine in euvolemic state  - Please call the renal team if questions or issues arise over the weekend

## 2024-12-14 NOTE — PLAN OF CARE
Problem: PAIN - ADULT  Goal: Verbalizes/displays adequate comfort level or baseline comfort level  Description: Interventions:  - Encourage patient to monitor pain and request assistance  - Assess pain using appropriate pain scale  - Administer analgesics based on type and severity of pain and evaluate response  - Implement non-pharmacological measures as appropriate and evaluate response  - Consider cultural and social influences on pain and pain management  - Notify physician/advanced practitioner if interventions unsuccessful or patient reports new pain  Outcome: Progressing     Problem: INFECTION - ADULT  Goal: Absence or prevention of progression during hospitalization  Description: INTERVENTIONS:  - Assess and monitor for signs and symptoms of infection  - Monitor lab/diagnostic results  - Monitor all insertion sites, i.e. indwelling lines, tubes, and drains  - Monitor endotracheal if appropriate and nasal secretions for changes in amount and color  - [place] appropriate cooling/warming therapies per order  - Administer medications as ordered  - Instruct and encourage patient and family to use good hand hygiene technique  - Identify and instruct in appropriate isolation precautions for identified infection/condition  Outcome: Progressing  Goal: Absence of fever/infection during neutropenic period  Description: INTERVENTIONS:  - Monitor WBC    Outcome: Progressing     Problem: SAFETY ADULT  Goal: Patient will remain free of falls  Description: INTERVENTIONS:  - Educate patient/family on patient safety including physical limitations  - Instruct patient to call for assistance with activity   - Consult OT/PT to assist with strengthening/mobility   - Keep Call bell within reach  - Keep bed low and locked with side rails adjusted as appropriate  - Keep care items and personal belongings within reach  - Initiate and maintain comfort rounds  - Make Fall Risk Sign visible to staff  - Offer Toileting every 4 Hours,  in advance of need  - Initiate/Maintain bed/chair alarm  - Apply yellow socks and bracelet for high fall risk patients  - Consider moving patient to room near nurses station  Outcome: Progressing  Goal: Maintain or return to baseline ADL function  Description: INTERVENTIONS:  -  Assess patient's ability to carry out ADLs; assess patient's baseline for ADL function and identify physical deficits which impact ability to perform ADLs (bathing, care of mouth/teeth, toileting, grooming, dressing, etc.)  - Assess/evaluate cause of self-care deficits   - Assess range of motion  - Assess patient's mobility; develop plan if impaired  - Assess patient's need for assistive devices and provide as appropriate  - Encourage maximum independence but intervene and supervise when necessary  - Involve family in performance of ADLs  - Assess for home care needs following discharge   - Consider OT consult to assist with ADL evaluation and planning for discharge  - Provide patient education as appropriate  Outcome: Progressing  Goal: Maintains/Returns to pre admission functional level  Description: INTERVENTIONS:  - Perform AM-PAC 6 Click Basic Mobility/ Daily Activity assessment daily.  - Set and communicate daily mobility goal to care team and patient/family/caregiver.   - Collaborate with rehabilitation services on mobility goals if consulted  - Perform Range of Motion 3 times a day.  - Reposition patient every 2 hours.  - Dangle patient 3 times a day  - Stand patient 1 times a day  - Out of bed to chair 1 times a day   - Out of bed for meals 1 times a day  - Out of bed for toileting  - Record patient progress and toleration of activity level   Outcome: Progressing     Problem: DISCHARGE PLANNING  Goal: Discharge to home or other facility with appropriate resources  Description: INTERVENTIONS:  - Identify barriers to discharge w/patient and caregiver  - Arrange for needed discharge resources and transportation as appropriate  -  Identify discharge learning needs (meds, wound care, etc.)  - Arrange for interpretive services to assist at discharge as needed  - Refer to Case Management Department for coordinating discharge planning if the patient needs post-hospital services based on physician/advanced practitioner order or complex needs related to functional status, cognitive ability, or social support system  Outcome: Progressing     Problem: Knowledge Deficit  Goal: Patient/family/caregiver demonstrates understanding of disease process, treatment plan, medications, and discharge instructions  Description: Complete learning assessment and assess knowledge base.  Interventions:  - Provide teaching at level of understanding  - Provide teaching via preferred learning methods  Outcome: Progressing     Problem: Decreased Cardiac Output  Goal: Cardiac output adequate for individual needs  Description: INTERVENTIONS: Monitor for signs and symptoms of decreased cardiac output   - Monitor for dyspnea with exertion and at rest  - Monitor for orthopnea  - Monitor for signs of tachycardia. Place patient on telemetry monitoring.  - Assess patient for jugular vein distention  - Assess patient for lower extremity edema and poor peripheral perfusion   - Auscultate lung sound for Fine bibasilar crackles   - Monitor for cardiac arrythmias   - Administer beta blockers, antiarrhythmic, and blood pressure medications as ordered    Outcome: Progressing     Problem: Impaired Gas Exchange  Goal: Optimize oxygenation and ensure adequate ventilation  Description: INTERVENTIONS: Monitor for signs and symptoms of respiratory distress                - Elevate HOB or use high fowlers to promote lung expansion                - Administer oxygen as ordered to maintain adequate oxygenation                - Encourage use of IS to promote lung expansion and prevent PN                - Monitor ABGs to assess oxygenation status                - Monitor blood oxygen level to  maintain adequate oxygenation                - Encourage cough and deep breathing exercises to promote lung expansion                - Monitor patient's mental status for increased confusion    Outcome: Progressing     Problem: Excess Fluid Volume  Goal: Patient is able to achieve and maintain homeostasis  Description: INTERVENTIONS: Monitor for sign and symptoms of fluid overload  - Evaluate LE edema every shift  - Elevate LE to prevent dependent edema  - Apply SHIV stockings as ordered   - Monitor ankle circumference daily  - Assess for jugular vein distention  - Evaluate provider orders for the CHF diuretic algorithm. Administer diuretics as ordered  - Weigh the patient daily at 0600 and report a weight gain of five pounds or more   - Strict intake and output  - Monitor fluid intake and adhere to fluid restrictions  - Assess lung sounds every shift and as needed  - Monitor vital signs and lab values (CBC, chem, BUN, BNP)  - Measure and document urine output    Outcome: Progressing     Problem: Activity Intolerance  Goal: Patient is able to perform activities within their limitations  Description: INTERVENTIONS:                       -   Alternate periods of activity with periods of rest                 -   Patients is able to maintain normal vitals heart rhythm during activity                 -   Gradually increase activity and exercise as patient can tolerate                 -   Monitor blood pressure and heart before and after exercise                  -   Monitor blood oxygen saturation during activity and apply oxygen as needed    Outcome: Progressing     Problem: Knowledge Deficit  Goal: Patient is able to verbalize understanding of Heart Failure after education  Description: INTERVENTIONS:  - Educate the patient and family on signs and symptoms of HF  - Provide the patient with HF education and HF zone tool  - Educate on the importance of daily weight in the AM and reporting a weight gain               of 3  or more pounds to their primary care physician  - Monitor for SOB  - Maintain and sodium and fluid restriction  - Educate the patient on the importance of medications such as: diuretics, betablockers,               antiarrhythmics and their purpose, dose, route, side effects and labs               if they are needed    Outcome: Progressing     Problem: Prexisting or High Potential for Compromised Skin Integrity  Goal: Skin integrity is maintained or improved  Description: INTERVENTIONS:  - Identify patients at risk for skin breakdown  - Assess and monitor skin integrity  - Assess and monitor nutrition and hydration status  - Monitor labs   - Assess for incontinence   - Turn and reposition patient  - Assist with mobility/ambulation  - Relieve pressure over bony prominences  - Avoid friction and shearing  - Provide appropriate hygiene as needed including keeping skin clean and dry  - Evaluate need for skin moisturizer/barrier cream  - Collaborate with interdisciplinary team   - Patient/family teaching  - Consider wound care consult   Outcome: Progressing     Problem: Nutrition/Hydration-ADULT  Goal: Nutrient/Hydration intake appropriate for improving, restoring or maintaining nutritional needs  Description: Monitor and assess patient's nutrition/hydration status for malnutrition. Collaborate with interdisciplinary team and initiate plan and interventions as ordered.  Monitor patient's weight and dietary intake as ordered or per policy. Utilize nutrition screening tool and intervene as necessary. Determine patient's food preferences and provide high-protein, high-caloric foods as appropriate.     INTERVENTIONS:  - Monitor oral intake, urinary output, labs, and treatment plans  - Assess nutrition and hydration status and recommend course of action  - Evaluate amount of meals eaten  - Assist patient with eating if necessary   - Allow adequate time for meals  - Recommend/ encourage appropriate diets, oral nutritional  supplements, and vitamin/mineral supplements  - Order, calculate, and assess calorie counts as needed  - Recommend, monitor, and adjust tube feedings and TPN/PPN based on assessed needs  - Assess need for intravenous fluids  - Provide specific nutrition/hydration education as appropriate  - Include patient/family/caregiver in decisions related to nutrition  Outcome: Progressing

## 2024-12-14 NOTE — ASSESSMENT & PLAN NOTE
Lab Results   Component Value Date    HGBA1C 7.7 (H) 12/07/2024     Recent Labs     12/13/24  1126 12/13/24  1636 12/13/24  2103 12/14/24  0726   POCGLU 209* 198* 304* 119     Prior to admission on glimepiride  Sliding scale only for now  Monitor glucose log

## 2024-12-14 NOTE — PROGRESS NOTES
"Progress Note - Urology      Patient: Yao Tipton   : 1940 Sex: male   MRN: [id number]     CSN: [id number]  Unit/Bed#: [address] [place] [address]     SUBJECTIVE:   Patient seen on evening rounds  No issues with indwelling Dupont catheter  Diuresing nicely  Diabetes not under control  Hematuria clearing most likely Dupont trauma      Objective   Vitals: /66   Pulse 57   Temp (!) 97.1 °F (36.2 °C)   Resp 18   Ht 5' 8\" (1.727 m)   Wt 62.5 kg (137 lb 12.8 oz)   SpO2 99%   BMI 20.95 kg/m²     I/O last 24 hours:  In: 480 [P.O.:480]  Out: 1800 [Urine:1800]      Physical Exam:   General Alert awake   Normocephalic atraumatic PERRLA  Lungs clear bilaterally  Cardiac normal S1 normal S2  Abdomen soft, flank pain  Dupont draining clear urine  Extremities no edema      Lab Results: CBC:   Lab Results   Component Value Date    WBC 5.03 2024    HGB 10.7 (L) 2024    HCT 32.7 (L) 2024    MCV 99 (H) 2024    PLT 77 (L) 2024    RBC 3.30 (L) 2024    MCH 32.4 2024    MCHC 32.7 2024    RDW 16.4 (H) 2024    MPV 10.2 2024    NRBC 0 2024     CMP:   Lab Results   Component Value Date    CL 96 2024    CO2 33 (H) 2024    BUN 57 (H) 2024    CREATININE 2.11 (H) 2024    CALCIUM 8.1 (L) 2024    AST 22 2024    ALT 22 2024    ALKPHOS 122 (H) 2024    EGFR 27 2024     Urinalysis:   Lab Results   Component Value Date    COLORU [place] 2024    CLARITYU Turbid 2024    SPECGRAV 1.020 2024    PHUR 8.0 2024    PHUR 5.0 2019    PHUR 5.5 01/10/2019    LEUKOCYTESUR Large (A) 2024    NITRITE Positive (A) 2024    GLUCOSEU Negative 2024    KETONESU Negative 2024    BILIRUBINUR Negative 2024    BLOODU Moderate (A) 2024     Urine Culture:   Lab Results   Component Value Date    URINECX >100,000 cfu/ml Proteus mirabilis (A) 12/10/2024     PSA: No results found for: " "\"PSA\"      Assessment/ Plan:  Urinary retention  Gross hematuria most likely Dupont trauma  Acute on chronic congestive heart failure  Renal failure  Continue Dupont catheter voiding        Cliff Erazo MD  "

## 2024-12-14 NOTE — PLAN OF CARE
Problem: PAIN - ADULT  Goal: Verbalizes/displays adequate comfort level or baseline comfort level  Description: Interventions:  - Encourage patient to monitor pain and request assistance  - Assess pain using appropriate pain scale  - Administer analgesics based on type and severity of pain and evaluate response  - Implement non-pharmacological measures as appropriate and evaluate response  - Consider cultural and social influences on pain and pain management  - Notify physician/advanced practitioner if interventions unsuccessful or patient reports new pain  Outcome: Progressing     Problem: INFECTION - ADULT  Goal: Absence or prevention of progression during hospitalization  Description: INTERVENTIONS:  - Assess and monitor for signs and symptoms of infection  - Monitor lab/diagnostic results  - Monitor all insertion sites, i.e. indwelling lines, tubes, and drains  - Monitor endotracheal if appropriate and nasal secretions for changes in amount and color  - [place] appropriate cooling/warming therapies per order  - Administer medications as ordered  - Instruct and encourage patient and family to use good hand hygiene technique  - Identify and instruct in appropriate isolation precautions for identified infection/condition  Outcome: Progressing  Goal: Absence of fever/infection during neutropenic period  Description: INTERVENTIONS:  - Monitor WBC    Outcome: Progressing     Problem: SAFETY ADULT  Goal: Patient will remain free of falls  Description: INTERVENTIONS:  - Educate patient/family on patient safety including physical limitations  - Instruct patient to call for assistance with activity   - Consult OT/PT to assist with strengthening/mobility   - Keep Call bell within reach  - Keep bed low and locked with side rails adjusted as appropriate  - Keep care items and personal belongings within reach  - Initiate and maintain comfort rounds  - Make Fall Risk Sign visible to staff  - Offer Toileting every 2 Hours,  in advance of need  - Initiate/Maintain bed alarm  - Obtain necessary fall risk management equipment: slipper socks  - Apply yellow socks and bracelet for high fall risk patients  - Consider moving patient to room near nurses station  Outcome: Progressing  Goal: Maintain or return to baseline ADL function  Description: INTERVENTIONS:  -  Assess patient's ability to carry out ADLs; assess patient's baseline for ADL function and identify physical deficits which impact ability to perform ADLs (bathing, care of mouth/teeth, toileting, grooming, dressing, etc.)  - Assess/evaluate cause of self-care deficits   - Assess range of motion  - Assess patient's mobility; develop plan if impaired  - Assess patient's need for assistive devices and provide as appropriate  - Encourage maximum independence but intervene and supervise when necessary  - Involve family in performance of ADLs  - Assess for home care needs following discharge   - Consider OT consult to assist with ADL evaluation and planning for discharge  - Provide patient education as appropriate  Outcome: Progressing  Goal: Maintains/Returns to pre admission functional level  Description: INTERVENTIONS:  - Perform AM-PAC 6 Click Basic Mobility/ Daily Activity assessment daily.  - Set and communicate daily mobility goal to care team and patient/family/caregiver.   - Collaborate with rehabilitation services on mobility goals if consulted  - Perform Range of Motion 3 times a day.  - Reposition patient every 2 hours.  - Dangle patient 3 times a day  - Stand patient 3 times a day  - Ambulate patient 3 times a day  - Out of bed to chair 3 times a day   - Out of bed for meals 3 times a day  - Out of bed for toileting  - Record patient progress and toleration of activity level   Outcome: Progressing     Problem: DISCHARGE PLANNING  Goal: Discharge to home or other facility with appropriate resources  Description: INTERVENTIONS:  - Identify barriers to discharge w/patient and  caregiver  - Arrange for needed discharge resources and transportation as appropriate  - Identify discharge learning needs (meds, wound care, etc.)  - Arrange for interpretive services to assist at discharge as needed  - Refer to Case Management Department for coordinating discharge planning if the patient needs post-hospital services based on physician/advanced practitioner order or complex needs related to functional status, cognitive ability, or social support system  Outcome: Progressing     Problem: Knowledge Deficit  Goal: Patient/family/caregiver demonstrates understanding of disease process, treatment plan, medications, and discharge instructions  Description: Complete learning assessment and assess knowledge base.  Interventions:  - Provide teaching at level of understanding  - Provide teaching via preferred learning methods  Outcome: Progressing     Problem: Decreased Cardiac Output  Goal: Cardiac output adequate for individual needs  Description: INTERVENTIONS: Monitor for signs and symptoms of decreased cardiac output   - Monitor for dyspnea with exertion and at rest  - Monitor for orthopnea  - Monitor for signs of tachycardia. Place patient on telemetry monitoring.  - Assess patient for jugular vein distention  - Assess patient for lower extremity edema and poor peripheral perfusion   - Auscultate lung sound for Fine bibasilar crackles   - Monitor for cardiac arrythmias   - Administer beta blockers, antiarrhythmic, and blood pressure medications as ordered    Outcome: Progressing     Problem: Impaired Gas Exchange  Goal: Optimize oxygenation and ensure adequate ventilation  Description: INTERVENTIONS: Monitor for signs and symptoms of respiratory distress                - Elevate HOB or use high fowlers to promote lung expansion                - Administer oxygen as ordered to maintain adequate oxygenation                - Encourage use of IS to promote lung expansion and prevent PN                -  Monitor ABGs to assess oxygenation status                - Monitor blood oxygen level to maintain adequate oxygenation                - Encourage cough and deep breathing exercises to promote lung expansion                - Monitor patient's mental status for increased confusion    Outcome: Progressing     Problem: Excess Fluid Volume  Goal: Patient is able to achieve and maintain homeostasis  Description: INTERVENTIONS: Monitor for sign and symptoms of fluid overload  - Evaluate LE edema every shift  - Elevate LE to prevent dependent edema  - Apply SHIV stockings as ordered   - Monitor ankle circumference daily  - Assess for jugular vein distention  - Evaluate provider orders for the CHF diuretic algorithm. Administer diuretics as ordered  - Weigh the patient daily at 0600 and report a weight gain of five pounds or more   - Strict intake and output  - Monitor fluid intake and adhere to fluid restrictions  - Assess lung sounds every shift and as needed  - Monitor vital signs and lab values (CBC, chem, BUN, BNP)  - Measure and document urine output    Outcome: Progressing     Problem: Activity Intolerance  Goal: Patient is able to perform activities within their limitations  Description: INTERVENTIONS:                       -   Alternate periods of activity with periods of rest                 -   Patients is able to maintain normal vitals heart rhythm during activity                 -   Gradually increase activity and exercise as patient can tolerate                 -   Monitor blood pressure and heart before and after exercise                  -   Monitor blood oxygen saturation during activity and apply oxygen as needed    Outcome: Progressing     Problem: Knowledge Deficit  Goal: Patient is able to verbalize understanding of Heart Failure after education  Description: INTERVENTIONS:  - Educate the patient and family on signs and symptoms of HF  - Provide the patient with HF education and HF zone tool  - Educate on  the importance of daily weight in the AM and reporting a weight gain               of 3 or more pounds to their primary care physician  - Monitor for SOB  - Maintain and sodium and fluid restriction  - Educate the patient on the importance of medications such as: diuretics, betablockers,               antiarrhythmics and their purpose, dose, route, side effects and labs               if they are needed    Outcome: Progressing     Problem: Prexisting or High Potential for Compromised Skin Integrity  Goal: Skin integrity is maintained or improved  Description: INTERVENTIONS:  - Identify patients at risk for skin breakdown  - Assess and monitor skin integrity  - Assess and monitor nutrition and hydration status  - Monitor labs   - Assess for incontinence   - Turn and reposition patient  - Assist with mobility/ambulation  - Relieve pressure over bony prominences  - Avoid friction and shearing  - Provide appropriate hygiene as needed including keeping skin clean and dry  - Evaluate need for skin moisturizer/barrier cream  - Collaborate with interdisciplinary team   - Patient/family teaching  - Consider wound care consult   Outcome: Progressing     Problem: Nutrition/Hydration-ADULT  Goal: Nutrient/Hydration intake appropriate for improving, restoring or maintaining nutritional needs  Description: Monitor and assess patient's nutrition/hydration status for malnutrition. Collaborate with interdisciplinary team and initiate plan and interventions as ordered.  Monitor patient's weight and dietary intake as ordered or per policy. Utilize nutrition screening tool and intervene as necessary. Determine patient's food preferences and provide high-protein, high-caloric foods as appropriate.     INTERVENTIONS:  - Monitor oral intake, urinary output, labs, and treatment plans  - Assess nutrition and hydration status and recommend course of action  - Evaluate amount of meals eaten  - Assist patient with eating if necessary   - Allow  adequate time for meals  - Recommend/ encourage appropriate diets, oral nutritional supplements, and vitamin/mineral supplements  - Order, calculate, and assess calorie counts as needed  - Recommend, monitor, and adjust tube feedings and TPN/PPN based on assessed needs  - Assess need for intravenous fluids  - Provide specific nutrition/hydration education as appropriate  - Include patient/family/caregiver in decisions related to nutrition  Outcome: Progressing

## 2024-12-14 NOTE — ASSESSMENT & PLAN NOTE
Noted on 12/10 during rounds most probably traumatic secondary to Dupont's catheter insertion  No clots noted    -Consult urology; voiding trial when appropriate per urology recs   -Restart Eliquis 12/14/2024, hemoglobin has been stable

## 2024-12-14 NOTE — PROGRESS NOTES
Progress Note - Hospitalist   Name: Yao Tipton 84 y.o. male I MRN: [id number]  Unit/Bed#: 4 [address] I Date of Admission: 12/7/2024   Date of Service: 12/14/2024 I Hospital Day: 6     Assessment & Plan  Acute on chronic combined systolic and diastolic congestive heart failure (HCC)  Wt Readings from Last 3 Encounters:   12/14/24 62.5 kg (137 lb 12.8 oz)   08/19/24 67.2 kg (148 lb 3.2 oz)   08/08/24 66.7 kg (147 lb)     History of CKD 4 diabetes mellitus BPH atrial fibrillation and combined CHF who presents to the hospital for worsening leg edema  Nephrology and Cardiology consulted.  There is history of dietary indiscretion including consumption of salt food  Transition from IV Lasix to PO torsemide per nephrology recommendations.  Continue on torsemide 40 mg twice daily  Continue to monitor I/Os Daily weight.    Intake/Output Summary (Last 24 hours) at 12/14/2024 0951  Last data filed at 12/14/2024 0559  Gross per 24 hour   Intake 240 ml   Output 1800 ml   Net -1560 ml       Chronic kidney disease, stage 4 (severe) ([facility])  Baseline creatinine closer to 2.0.  Nephrology consulted    Results from last 7 days   Lab Units 12/14/24  0557 12/13/24  0536 12/12/24  0448 12/11/24  0500 12/10/24  0513 12/09/24  0453 12/08/24  0434 12/07/24  1439   BUN mg/dL 57* 60* 55* 56* 57* 60* 65* 63*   CREATININE mg/dL 2.11* 2.17* 2.01* 1.96* 1.95* 1.94* 1.95* 1.97*   EGFR ml/min/1.73sq m 27 26 29 30 30 30 30 29   Renal function appears baseline  Open wound of both lower extremities  Blisters of lower extremities; wound care consulted  Continue localized wound care, does not look overly infected  Diabetes mellitus with peripheral artery disease (HCC)  Lab Results   Component Value Date    HGBA1C 7.7 (H) 12/07/2024     Recent Labs     12/13/24  1126 12/13/24  1636 12/13/24  2103 12/14/24  0726   POCGLU 209* 198* 304* 119     Prior to admission on glimepiride  Sliding scale only for now  Monitor glucose log  Chronic atrial  fibrillation (HCC)  Chronic atrial fibrillation bradycardia.  Monitor on telemetry.  Anticoagulation: Restart Eliquis 12/14/2024    Duodenal ulcer  History of Beck's esophagitis with large duodenal ulcer  Started on famotidine this admission  BPH (benign prostatic hyperplasia)  With history of urinary retention previously  Continue tamsulosin  Consult urology given acute urinary retention noted on 12/9.  Essential hypertension  Patient on torsemide alone at home  Due to bradycardia he was not initiated on beta-blocker  Thrombocytopenia (HCC)  Baseline platelet count 101 - 143 in past year  Monitor count  Dyslipidemia  Continue statin  Gout  Continue allopurinol  Acute urinary retention  Noted per nursing on 12/9.  S/p Dupont's catheter insertion      -Voiding trial next 24 hours.  Gross hematuria  Noted on 12/10 during rounds most probably traumatic secondary to Dupont's catheter insertion  No clots noted    -Consult urology; voiding trial when appropriate per urology recs   -Restart Eliquis 12/14/2024, hemoglobin has been stable  Macrocytic anemia  Check B12 and folate    VTE Pharmacologic Prophylaxis: VTE Score: 4 Moderate Risk (Score 3-4) - Pharmacological DVT Prophylaxis Ordered: apixaban (Eliquis).    Mobility:   Basic Mobility Inpatient Raw Score: 17  JH-HLM Goal: 5: Stand one or more mins  JH-HLM Achieved: 4: Move to chair/commode  JH-HLM Goal achieved. Continue to encourage appropriate mobility.    Patient Centered Rounds: I performed bedside rounds with nursing staff today.   Discussions with Specialists or Other Care Team Provider: Appreciate urology recommendations    Education and Discussions with Family / Patient: Will discuss with son    Current Length of Stay: 6 day(s)  Current Patient Status: Inpatient   Certification Statement: The patient will continue to require additional inpatient hospital stay due to acute on chronic congestive heart failure, gross hematuria  Discharge Plan: Anticipate  discharge tomorrow to home with home services.    Code Status: Level 3 - DNAR and DNI    Subjective   Patient seen and examined at bedside this morning, Dupont clear urine after traumatic in the setting of urinary retention.  Appreciate urology recommendations on void trial, given stable hemoglobin restart Eliquis, anticipate discharge tomorrow.    Objective :  Temp:  [97.1 °F (36.2 °C)] 97.1 °F (36.2 °C)  HR:  [69-79] 77  BP: (121-125)/(66-75) 122/75  Resp:  [16-18] 18  SpO2:  [96 %-98 %] 96 %    Body mass index is 20.95 kg/m².     Input and Output Summary (last 24 hours):     Intake/Output Summary (Last 24 hours) at 12/14/2024 0951  Last data filed at 12/14/2024 0559  Gross per 24 hour   Intake 240 ml   Output 1800 ml   Net -1560 ml       Physical Exam  Vitals and nursing note reviewed.   Constitutional:       General: He is not in acute distress.     Appearance: He is well-developed.   HENT:      Head: Normocephalic and atraumatic.   Eyes:      General: No scleral icterus.     Conjunctiva/sclera: Conjunctivae normal.   Cardiovascular:      Rate and Rhythm: Normal rate and regular rhythm.      Pulses: Normal pulses.      Heart sounds: No murmur heard.  Pulmonary:      Effort: Pulmonary effort is normal. No respiratory distress.      Breath sounds: Normal breath sounds.   Abdominal:      General: Bowel sounds are normal. There is no distension.      Palpations: Abdomen is soft.      Tenderness: There is no abdominal tenderness.   Genitourinary:     Comments: Urine catheter clear fluid  Musculoskeletal:         General: No swelling. Normal range of motion.      Cervical back: Neck supple.   Skin:     General: Skin is warm and dry.      Capillary Refill: Capillary refill takes less than 2 seconds.   Neurological:      General: No focal deficit present.      Mental Status: He is alert and oriented to person, place, and time. Mental status is at baseline.   Psychiatric:         Mood and Affect: Mood normal.          Behavior: Behavior normal.         Lines/Drains:  Lines/Drains/Airways       Active Status       Name Placement date Placement time Site Days    Urethral Catheter 16 Fr. 12/09/24  1800  --  4                  Urinary Catheter:  Goal for removal: Voiding trial when ambulation improves                 Lab Results: I have reviewed the following results:   Results from last 7 days   Lab Units 12/14/24  0557 12/13/24  0536   WBC Thousand/uL 5.03 4.39   HEMOGLOBIN g/dL 10.7* 9.6*   HEMATOCRIT % 32.7* 29.8*   PLATELETS Thousands/uL 77* 72*   SEGS PCT %  --  59   LYMPHO PCT %  --  24   MONO PCT %  --  15*   EOS PCT %  --  2     Results from last 7 days   Lab Units 12/14/24  0557 12/10/24  0513 12/09/24  0453   SODIUM mmol/L 134*   < > 136   POTASSIUM mmol/L 4.2   < > 4.0   CHLORIDE mmol/L 96   < > 100   CO2 mmol/L 33*   < > 28   BUN mg/dL 57*   < > 60*   CREATININE mg/dL 2.11*   < > 1.94*   ANION GAP mmol/L 5   < > 8   CALCIUM mg/dL 8.1*   < > 8.0*   ALBUMIN g/dL  --   --  3.3*   TOTAL BILIRUBIN mg/dL  --   --  0.74   ALK PHOS U/L  --   --  122*   ALT U/L  --   --  22   AST U/L  --   --  22   GLUCOSE RANDOM mg/dL 121   < > 36*    < > = values in this interval not displayed.         Results from last 7 days   Lab Units 12/14/24  0726 12/13/24  2103 12/13/24  1636 12/13/24  1126 12/13/24  0832 12/13/24  0800 12/12/24  2132 12/12/24  1711 12/12/24  1636 12/12/24  1102 12/12/24  0719 12/11/24  2120   POC GLUCOSE mg/dl 119 304* 198* 209* 101 50* 175* 73 53* 255* 247* 242*     Results from last 7 days   Lab Units 12/07/24  1439   HEMOGLOBIN A1C % 7.7*           Recent Cultures (last 7 days):   Results from last 7 days   Lab Units 12/10/24  1606   URINE CULTURE  >100,000 cfu/ml Proteus mirabilis*         Last 24 Hours Medication List:     Current Facility-Administered Medications:     acetaminophen (TYLENOL) tablet 650 mg, Q6H PRN    allopurinol (ZYLOPRIM) tablet 100 mg, BID    apixaban (ELIQUIS) tablet 2.5 mg, BID    ascorbic  acid (VITAMIN C) tablet 500 mg, Daily    atorvastatin (LIPITOR) tablet 40 mg, Daily With Dinner    Cholecalciferol (VITAMIN D3) tablet 2,000 Units, Daily    docusate sodium (COLACE) capsule 100 mg, BID    famotidine (PEPCID) tablet 10 mg, HS    ferrous sulfate tablet 325 mg, Daily With Breakfast    insulin lispro (HumALOG/ADMELOG) 100 units/mL subcutaneous injection 1-5 Units, TID AC **AND** Fingerstick Glucose (POCT), TID AC    insulin lispro (HumALOG/ADMELOG) 100 units/mL subcutaneous injection 1-5 Units, HS    latanoprost (XALATAN) 0.005 % ophthalmic solution 1 drop, HS    nystatin (MYCOSTATIN) powder, BID    polyethylene glycol (MIRALAX) packet 17 g, Daily    senna (SENOKOT) tablet 17.2 mg, HS    sodium chloride (OCEAN) 0.65 % nasal spray 1 spray, Q1H PRN    tamsulosin (FLOMAX) capsule 0.4 mg, Daily With Dinner    timolol (TIMOPTIC) 0.5 % ophthalmic solution 1 drop, Daily    torsemide (DEMADEX) tablet 40 mg, BID (diuretic)    zinc sulfate (ZINCATE) capsule 220 mg, Daily    Administrative Statements   Today, Patient Was Seen By: Artemio Pacheco DO      **Please Note: This note may have been constructed using a voice recognition system.**

## 2024-12-15 VITALS
HEART RATE: 83 BPM | RESPIRATION RATE: 19 BRPM | DIASTOLIC BLOOD PRESSURE: 64 MMHG | TEMPERATURE: 97.1 F | WEIGHT: 137.35 LBS | SYSTOLIC BLOOD PRESSURE: 120 MMHG | BODY MASS INDEX: 20.82 KG/M2 | OXYGEN SATURATION: 98 % | HEIGHT: 68 IN

## 2024-12-15 LAB
ALBUMIN SERPL BCG-MCNC: 2.9 G/DL (ref 3.5–5)
ALP SERPL-CCNC: 132 U/L (ref 34–104)
ALT SERPL W P-5'-P-CCNC: 38 U/L (ref 7–52)
ANION GAP SERPL CALCULATED.3IONS-SCNC: 6 MMOL/L (ref 4–13)
AST SERPL W P-5'-P-CCNC: 32 U/L (ref 13–39)
BASOPHILS # BLD AUTO: 0.01 THOUSANDS/ÂΜL (ref 0–0.1)
BASOPHILS NFR BLD AUTO: 0 % (ref 0–1)
BILIRUB SERPL-MCNC: 0.82 MG/DL (ref 0.2–1)
BUN SERPL-MCNC: 62 MG/DL (ref 5–25)
CALCIUM ALBUM COR SERPL-MCNC: 8.8 MG/DL (ref 8.3–10.1)
CALCIUM SERPL-MCNC: 7.9 MG/DL (ref 8.4–10.2)
CHLORIDE SERPL-SCNC: 96 MMOL/L (ref 96–108)
CO2 SERPL-SCNC: 33 MMOL/L (ref 21–32)
CREAT SERPL-MCNC: 2.21 MG/DL (ref 0.6–1.3)
EOSINOPHIL # BLD AUTO: 0.06 THOUSAND/ÂΜL (ref 0–0.61)
EOSINOPHIL NFR BLD AUTO: 1 % (ref 0–6)
ERYTHROCYTE [DISTWIDTH] IN BLOOD BY AUTOMATED COUNT: 16.6 % (ref 11.6–15.1)
GFR SERPL CREATININE-BSD FRML MDRD: 26 ML/MIN/1.73SQ M
GLUCOSE SERPL-MCNC: 137 MG/DL (ref 65–140)
GLUCOSE SERPL-MCNC: 146 MG/DL (ref 65–140)
GLUCOSE SERPL-MCNC: 253 MG/DL (ref 65–140)
HCT VFR BLD AUTO: 31.5 % (ref 36.5–49.3)
HGB BLD-MCNC: 10.4 G/DL (ref 12–17)
IMM GRANULOCYTES # BLD AUTO: 0.02 THOUSAND/UL (ref 0–0.2)
IMM GRANULOCYTES NFR BLD AUTO: 0 % (ref 0–2)
LYMPHOCYTES # BLD AUTO: 1.03 THOUSANDS/ÂΜL (ref 0.6–4.47)
LYMPHOCYTES NFR BLD AUTO: 19 % (ref 14–44)
MCH RBC QN AUTO: 32.8 PG (ref 26.8–34.3)
MCHC RBC AUTO-ENTMCNC: 33 G/DL (ref 31.4–37.4)
MCV RBC AUTO: 99 FL (ref 82–98)
MONOCYTES # BLD AUTO: 0.58 THOUSAND/ÂΜL (ref 0.17–1.22)
MONOCYTES NFR BLD AUTO: 11 % (ref 4–12)
NEUTROPHILS # BLD AUTO: 3.6 THOUSANDS/ÂΜL (ref 1.85–7.62)
NEUTS SEG NFR BLD AUTO: 69 % (ref 43–75)
NRBC BLD AUTO-RTO: 0 /100 WBCS
PLATELET # BLD AUTO: 79 THOUSANDS/UL (ref 149–390)
PMV BLD AUTO: 10.3 FL (ref 8.9–12.7)
POTASSIUM SERPL-SCNC: 4.4 MMOL/L (ref 3.5–5.3)
PROT SERPL-MCNC: 5.3 G/DL (ref 6.4–8.4)
RBC # BLD AUTO: 3.17 MILLION/UL (ref 3.88–5.62)
SODIUM SERPL-SCNC: 135 MMOL/L (ref 135–147)
WBC # BLD AUTO: 5.3 THOUSAND/UL (ref 4.31–10.16)

## 2024-12-15 PROCEDURE — 82948 REAGENT STRIP/BLOOD GLUCOSE: CPT

## 2024-12-15 PROCEDURE — 80053 COMPREHEN METABOLIC PANEL: CPT | Performed by: STUDENT IN AN ORGANIZED HEALTH CARE EDUCATION/TRAINING PROGRAM

## 2024-12-15 PROCEDURE — 85025 COMPLETE CBC W/AUTO DIFF WBC: CPT | Performed by: STUDENT IN AN ORGANIZED HEALTH CARE EDUCATION/TRAINING PROGRAM

## 2024-12-15 PROCEDURE — 99239 HOSP IP/OBS DSCHRG MGMT >30: CPT | Performed by: STUDENT IN AN ORGANIZED HEALTH CARE EDUCATION/TRAINING PROGRAM

## 2024-12-15 RX ORDER — TORSEMIDE 20 MG/1
40 TABLET ORAL 2 TIMES DAILY
Qty: 60 TABLET | Refills: 0 | Status: SHIPPED | OUTPATIENT
Start: 2024-12-15

## 2024-12-15 RX ORDER — FAMOTIDINE 10 MG
10 TABLET ORAL
Qty: 30 TABLET | Refills: 0 | Status: SHIPPED | OUTPATIENT
Start: 2024-12-15

## 2024-12-15 RX ADMIN — ZINC SULFATE 220 MG (50 MG) CAPSULE 220 MG: CAPSULE at 09:37

## 2024-12-15 RX ADMIN — POLYETHYLENE GLYCOL 3350 17 G: 17 POWDER, FOR SOLUTION ORAL at 09:37

## 2024-12-15 RX ADMIN — NYSTATIN: 100000 POWDER TOPICAL at 09:38

## 2024-12-15 RX ADMIN — Medication 2000 UNITS: at 09:37

## 2024-12-15 RX ADMIN — INSULIN LISPRO 2 UNITS: 100 INJECTION, SOLUTION INTRAVENOUS; SUBCUTANEOUS at 11:45

## 2024-12-15 RX ADMIN — ALLOPURINOL 100 MG: 100 TABLET ORAL at 09:37

## 2024-12-15 RX ADMIN — DOCUSATE SODIUM 100 MG: 100 CAPSULE, LIQUID FILLED ORAL at 09:37

## 2024-12-15 RX ADMIN — FERROUS SULFATE TAB 325 MG (65 MG ELEMENTAL FE) 325 MG: 325 (65 FE) TAB at 09:37

## 2024-12-15 RX ADMIN — TORSEMIDE 40 MG: 20 TABLET ORAL at 09:37

## 2024-12-15 RX ADMIN — TIMOLOL MALEATE 1 DROP: 5 SOLUTION OPHTHALMIC at 09:38

## 2024-12-15 RX ADMIN — APIXABAN 2.5 MG: 2.5 TABLET, FILM COATED ORAL at 09:37

## 2024-12-15 RX ADMIN — OXYCODONE HYDROCHLORIDE AND ACETAMINOPHEN 500 MG: 500 TABLET ORAL at 09:37

## 2024-12-15 NOTE — ASSESSMENT & PLAN NOTE
With history of urinary retention previously  Continue tamsulosin  Consult urology given acute urinary retention noted on 12/9

## 2024-12-15 NOTE — NURSING NOTE
AVS reviewed with patient's son, including follow up appointments, discharge instructions and medications. States understanding. Victor cath education demonstrated and printed information also given as well as leg bag and extra victor bag. Wound care instructions reviewed. All questions answered. Discharged to home with belongings.

## 2024-12-15 NOTE — ASSESSMENT & PLAN NOTE
Wt Readings from Last 3 Encounters:   12/15/24 62.3 kg (137 lb 5.6 oz)   08/19/24 67.2 kg (148 lb 3.2 oz)   08/08/24 66.7 kg (147 lb)     History of CKD 4 diabetes mellitus BPH atrial fibrillation and combined CHF who presents to the hospital for worsening leg edema  Nephrology and Cardiology consulted.  There is history of dietary indiscretion including consumption of salt food  Transition from IV Lasix to PO torsemide per nephrology recommendations.  Continue on torsemide 40 mg twice daily  Continue to monitor I/Os Daily weight.    Intake/Output Summary (Last 24 hours) at 12/15/2024 1002  Last data filed at 12/15/2024 0500  Gross per 24 hour   Intake --   Output 1150 ml   Net -1150 ml

## 2024-12-15 NOTE — ASSESSMENT & PLAN NOTE
Lab Results   Component Value Date    HGBA1C 7.7 (H) 12/07/2024     Recent Labs     12/14/24  1050 12/14/24  1552 12/14/24  2002 12/15/24  0739   POCGLU 224* 229* 255* 146*     Prior to admission on glimepiride  Sliding scale only for now  Monitor glucose log

## 2024-12-15 NOTE — PLAN OF CARE
Problem: PAIN - ADULT  Goal: Verbalizes/displays adequate comfort level or baseline comfort level  Description: Interventions:  - Encourage patient to monitor pain and request assistance  - Assess pain using appropriate pain scale  - Administer analgesics based on type and severity of pain and evaluate response  - Implement non-pharmacological measures as appropriate and evaluate response  - Consider cultural and social influences on pain and pain management  - Notify physician/advanced practitioner if interventions unsuccessful or patient reports new pain  Outcome: Progressing     Problem: INFECTION - ADULT  Goal: Absence or prevention of progression during hospitalization  Description: INTERVENTIONS:  - Assess and monitor for signs and symptoms of infection  - Monitor lab/diagnostic results  - Monitor all insertion sites, i.e. indwelling lines, tubes, and drains  - Monitor endotracheal if appropriate and nasal secretions for changes in amount and color  - [place] appropriate cooling/warming therapies per order  - Administer medications as ordered  - Instruct and encourage patient and family to use good hand hygiene technique  - Identify and instruct in appropriate isolation precautions for identified infection/condition  Outcome: Progressing  Goal: Absence of fever/infection during neutropenic period  Description: INTERVENTIONS:  - Monitor WBC    Outcome: Progressing     Problem: SAFETY ADULT  Goal: Patient will remain free of falls  Description: INTERVENTIONS:  - Educate patient/family on patient safety including physical limitations  - Instruct patient to call for assistance with activity   - Consult OT/PT to assist with strengthening/mobility   - Keep Call bell within reach  - Keep bed low and locked with side rails adjusted as appropriate  - Keep care items and personal belongings within reach  - Initiate and maintain comfort rounds  - Make Fall Risk Sign visible to staff  - Offer Toileting every 2 Hours,  in advance of need  - Initiate/Maintain bed alarm  - Obtain necessary fall risk management equipment: slipper socks  - Apply yellow socks and bracelet for high fall risk patients  - Consider moving patient to room near nurses station  Outcome: Progressing  Goal: Maintain or return to baseline ADL function  Description: INTERVENTIONS:  -  Assess patient's ability to carry out ADLs; assess patient's baseline for ADL function and identify physical deficits which impact ability to perform ADLs (bathing, care of mouth/teeth, toileting, grooming, dressing, etc.)  - Assess/evaluate cause of self-care deficits   - Assess range of motion  - Assess patient's mobility; develop plan if impaired  - Assess patient's need for assistive devices and provide as appropriate  - Encourage maximum independence but intervene and supervise when necessary  - Involve family in performance of ADLs  - Assess for home care needs following discharge   - Consider OT consult to assist with ADL evaluation and planning for discharge  - Provide patient education as appropriate  Outcome: Progressing  Goal: Maintains/Returns to pre admission functional level  Description: INTERVENTIONS:  - Perform AM-PAC 6 Click Basic Mobility/ Daily Activity assessment daily.  - Set and communicate daily mobility goal to care team and patient/family/caregiver.   - Collaborate with rehabilitation services on mobility goals if consulted  - Perform Range of Motion 3 times a day.  - Reposition patient every 2 hours.  - Dangle patient 3 times a day  - Stand patient 3 times a day  - Ambulate patient 3 times a day  - Out of bed to chair 3 times a day   - Out of bed for meals 3 times a day  - Out of bed for toileting  - Record patient progress and toleration of activity level   Outcome: Progressing     Problem: DISCHARGE PLANNING  Goal: Discharge to home or other facility with appropriate resources  Description: INTERVENTIONS:  - Identify barriers to discharge w/patient and  caregiver  - Arrange for needed discharge resources and transportation as appropriate  - Identify discharge learning needs (meds, wound care, etc.)  - Arrange for interpretive services to assist at discharge as needed  - Refer to Case Management Department for coordinating discharge planning if the patient needs post-hospital services based on physician/advanced practitioner order or complex needs related to functional status, cognitive ability, or social support system  Outcome: Progressing     Problem: Knowledge Deficit  Goal: Patient/family/caregiver demonstrates understanding of disease process, treatment plan, medications, and discharge instructions  Description: Complete learning assessment and assess knowledge base.  Interventions:  - Provide teaching at level of understanding  - Provide teaching via preferred learning methods  Outcome: Progressing     Problem: Decreased Cardiac Output  Goal: Cardiac output adequate for individual needs  Description: INTERVENTIONS: Monitor for signs and symptoms of decreased cardiac output   - Monitor for dyspnea with exertion and at rest  - Monitor for orthopnea  - Monitor for signs of tachycardia. Place patient on telemetry monitoring.  - Assess patient for jugular vein distention  - Assess patient for lower extremity edema and poor peripheral perfusion   - Auscultate lung sound for Fine bibasilar crackles   - Monitor for cardiac arrythmias   - Administer beta blockers, antiarrhythmic, and blood pressure medications as ordered    Outcome: Progressing     Problem: Impaired Gas Exchange  Goal: Optimize oxygenation and ensure adequate ventilation  Description: INTERVENTIONS: Monitor for signs and symptoms of respiratory distress                - Elevate HOB or use high fowlers to promote lung expansion                - Administer oxygen as ordered to maintain adequate oxygenation                - Encourage use of IS to promote lung expansion and prevent PN                -  Monitor ABGs to assess oxygenation status                - Monitor blood oxygen level to maintain adequate oxygenation                - Encourage cough and deep breathing exercises to promote lung expansion                - Monitor patient's mental status for increased confusion    Outcome: Progressing     Problem: Excess Fluid Volume  Goal: Patient is able to achieve and maintain homeostasis  Description: INTERVENTIONS: Monitor for sign and symptoms of fluid overload  - Evaluate LE edema every shift  - Elevate LE to prevent dependent edema  - Apply SHIV stockings as ordered   - Monitor ankle circumference daily  - Assess for jugular vein distention  - Evaluate provider orders for the CHF diuretic algorithm. Administer diuretics as ordered  - Weigh the patient daily at 0600 and report a weight gain of five pounds or more   - Strict intake and output  - Monitor fluid intake and adhere to fluid restrictions  - Assess lung sounds every shift and as needed  - Monitor vital signs and lab values (CBC, chem, BUN, BNP)  - Measure and document urine output    Outcome: Progressing     Problem: Activity Intolerance  Goal: Patient is able to perform activities within their limitations  Description: INTERVENTIONS:                       -   Alternate periods of activity with periods of rest                 -   Patients is able to maintain normal vitals heart rhythm during activity                 -   Gradually increase activity and exercise as patient can tolerate                 -   Monitor blood pressure and heart before and after exercise                  -   Monitor blood oxygen saturation during activity and apply oxygen as needed    Outcome: Progressing     Problem: Knowledge Deficit  Goal: Patient is able to verbalize understanding of Heart Failure after education  Description: INTERVENTIONS:  - Educate the patient and family on signs and symptoms of HF  - Provide the patient with HF education and HF zone tool  - Educate on  the importance of daily weight in the AM and reporting a weight gain               of 3 or more pounds to their primary care physician  - Monitor for SOB  - Maintain and sodium and fluid restriction  - Educate the patient on the importance of medications such as: diuretics, betablockers,               antiarrhythmics and their purpose, dose, route, side effects and labs               if they are needed    Outcome: Progressing     Problem: Prexisting or High Potential for Compromised Skin Integrity  Goal: Skin integrity is maintained or improved  Description: INTERVENTIONS:  - Identify patients at risk for skin breakdown  - Assess and monitor skin integrity  - Assess and monitor nutrition and hydration status  - Monitor labs   - Assess for incontinence   - Turn and reposition patient  - Assist with mobility/ambulation  - Relieve pressure over bony prominences  - Avoid friction and shearing  - Provide appropriate hygiene as needed including keeping skin clean and dry  - Evaluate need for skin moisturizer/barrier cream  - Collaborate with interdisciplinary team   - Patient/family teaching  - Consider wound care consult   Outcome: Progressing     Problem: Nutrition/Hydration-ADULT  Goal: Nutrient/Hydration intake appropriate for improving, restoring or maintaining nutritional needs  Description: Monitor and assess patient's nutrition/hydration status for malnutrition. Collaborate with interdisciplinary team and initiate plan and interventions as ordered.  Monitor patient's weight and dietary intake as ordered or per policy. Utilize nutrition screening tool and intervene as necessary. Determine patient's food preferences and provide high-protein, high-caloric foods as appropriate.     INTERVENTIONS:  - Monitor oral intake, urinary output, labs, and treatment plans  - Assess nutrition and hydration status and recommend course of action  - Evaluate amount of meals eaten  - Assist patient with eating if necessary   - Allow  adequate time for meals  - Recommend/ encourage appropriate diets, oral nutritional supplements, and vitamin/mineral supplements  - Order, calculate, and assess calorie counts as needed  - Recommend, monitor, and adjust tube feedings and TPN/PPN based on assessed needs  - Assess need for intravenous fluids  - Provide specific nutrition/hydration education as appropriate  - Include patient/family/caregiver in decisions related to nutrition  Outcome: Progressing

## 2024-12-15 NOTE — ASSESSMENT & PLAN NOTE
Baseline creatinine closer to 2.0.  Nephrology consulted    Results from last 7 days   Lab Units 12/15/24  0601 12/14/24  0557 12/13/24  0536 12/12/24  0448 12/11/24  0500 12/10/24  0513 12/09/24  0453   BUN mg/dL 62* 57* 60* 55* 56* 57* 60*   CREATININE mg/dL 2.21* 2.11* 2.17* 2.01* 1.96* 1.95* 1.94*   EGFR ml/min/1.73sq m 26 27 26 29 30 30 30   Renal function appears baseline

## 2024-12-15 NOTE — PROGRESS NOTES
"Progress Note - Urology      Patient: Yao Tipton   : 1940 Sex: male   MRN: [id number]     CSN: [id number]  Unit/Bed#: [address] [place] [address]     SUBJECTIVE:   Patient seen on rounds  No issues with indwelling Dupont catheter  Diuresing nicely  Diabetes  control  Hematuria clearing most likely Dupont trauma      Objective   Vitals: /64   Pulse 83   Temp (!) 97.1 °F (36.2 °C)   Resp 19   Ht 5' 8\" (1.727 m)   Wt 62.3 kg (137 lb 5.6 oz)   SpO2 98%   BMI 20.88 kg/m²     I/O last 24 hours:  In: -   Out: 1750 [Urine:1750]      Physical Exam:   General Alert awake   Normocephalic atraumatic PERRLA  Lungs clear bilaterally  Cardiac normal S1 normal S2  Abdomen soft, flank pain  Dupont draining clear urine  Extremities no edema      Lab Results: CBC:   Lab Results   Component Value Date    WBC 5.30 12/15/2024    HGB 10.4 (L) 12/15/2024    HCT 31.5 (L) 12/15/2024    MCV 99 (H) 12/15/2024    PLT 79 (L) 12/15/2024    RBC 3.17 (L) 12/15/2024    MCH 32.8 12/15/2024    MCHC 33.0 12/15/2024    RDW 16.6 (H) 12/15/2024    MPV 10.3 12/15/2024    NRBC 0 12/15/2024     CMP:   Lab Results   Component Value Date    CL 96 12/15/2024    CO2 33 (H) 12/15/2024    BUN 62 (H) 12/15/2024    CREATININE 2.21 (H) 12/15/2024    CALCIUM 7.9 (L) 12/15/2024    AST 32 12/15/2024    ALT 38 12/15/2024    ALKPHOS 132 (H) 12/15/2024    EGFR 26 12/15/2024     Urinalysis:   Lab Results   Component Value Date    COLORU Light Brown 2024    CLARITYU Turbid 2024    SPECGRAV 1.020 2024    PHUR 8.0 2024    PHUR 5.0 2019    PHUR 5.5 01/10/2019    LEUKOCYTESUR Large (A) 2024    NITRITE Positive (A) 2024    GLUCOSEU Negative 2024    KETONESU Negative 2024    BILIRUBINUR Negative 2024    BLOODU Moderate (A) 2024     Urine Culture:   Lab Results   Component Value Date    URINECX >100,000 cfu/ml Proteus mirabilis (A) 12/10/2024     PSA: No results found for: \"PSA\"      Assessment/ " Plan:  Urinary retention  Gross hematuria most likely Dupont trauma  Acute on chronic congestive heart failure  Renal failure  Continue Dupont catheter voiding as outpatient        Cliff Erazo MD

## 2024-12-15 NOTE — DISCHARGE SUMMARY
Discharge Summary - Hospitalist   Name: Yao Tipton 84 y.o. male I MRN: [id number]  Unit/Bed#: 4 [place] 422-01 I Date of Admission: 12/7/2024   Date of Service: 12/15/2024 I Hospital Day: 7     Assessment & Plan  Acute on chronic combined systolic and diastolic congestive heart failure (HCC)  Wt Readings from Last 3 Encounters:   12/15/24 62.3 kg (137 lb 5.6 oz)   08/19/24 67.2 kg (148 lb 3.2 oz)   08/08/24 66.7 kg (147 lb)     History of CKD 4 diabetes mellitus BPH atrial fibrillation and combined CHF who presents to the hospital for worsening leg edema  Nephrology and Cardiology consulted.  There is history of dietary indiscretion including consumption of salt food  Transition from IV Lasix to PO torsemide per nephrology recommendations.  Continue on torsemide 40 mg twice daily  Continue to monitor I/Os Daily weight.    Intake/Output Summary (Last 24 hours) at 12/15/2024 1002  Last data filed at 12/15/2024 0500  Gross per 24 hour   Intake --   Output 1150 ml   Net -1150 ml       Chronic kidney disease, stage 4 (severe) ([facility])  Baseline creatinine closer to 2.0.  Nephrology consulted    Results from last 7 days   Lab Units 12/15/24  0601 12/14/24  0557 12/13/24  0536 12/12/24  0448 12/11/24  0500 12/10/24  0513 12/09/24  0453   BUN mg/dL 62* 57* 60* 55* 56* 57* 60*   CREATININE mg/dL 2.21* 2.11* 2.17* 2.01* 1.96* 1.95* 1.94*   EGFR ml/min/1.73sq m 26 27 26 29 30 30 30   Renal function appears baseline  Open wound of both lower extremities  Blisters of lower extremities; wound care consulted  Continue localized wound care, does not look overly infected  Diabetes mellitus with peripheral artery disease (HCC)  Lab Results   Component Value Date    HGBA1C 7.7 (H) 12/07/2024     Recent Labs     12/14/24  1050 12/14/24  1552 12/14/24  2002 12/15/24  0739   POCGLU 224* 229* 255* 146*     Prior to admission on glimepiride  Sliding scale only for now  Monitor glucose log  Chronic atrial fibrillation (HCC)  Chronic atrial  fibrillation bradycardia.  Monitor on telemetry.  Anticoagulation: Restart Eliquis 12/14/2024    Duodenal ulcer  History of Beck's esophagitis with large duodenal ulcer  Started on famotidine this admission  BPH (benign prostatic hyperplasia)  With history of urinary retention previously  Continue tamsulosin  Consult urology given acute urinary retention noted on 12/9  Essential hypertension  Patient on torsemide alone at home  Due to bradycardia he was not initiated on beta-blocker  Thrombocytopenia (HCC)  Baseline platelet count 101 - 143 in past year  Monitor count  Dyslipidemia  Continue statin  Gout  Continue allopurinol  Acute urinary retention  Noted per nursing on 12/9.  S/p Dupont's catheter insertion      Given traumatic catheter insertion, will continue catheter in the outpatient setting, patient will have appropriate follow-up for void trial/follow-up with urology in the outpatient setting.  Gross hematuria  Noted on 12/10 during rounds most probably traumatic secondary to Dupont's catheter insertion  No clots noted    -Consult urology; voiding trial when appropriate per urology recs   -Restart Eliquis 12/14/2024, hemoglobin has been stable  Macrocytic anemia  Check B12 and folate     Medical Problems       Resolved Problems  Date Reviewed: 12/13/2024          Resolved    Chronic anemia 12/9/2024     Resolved by  Kenney Jeronimo DO    Hyponatremia 12/8/2024     Resolved by  Fredi Guadarrama MD        Discharging Physician / Practitioner: Artemio Pacheco DO  PCP: Nicho Baltazar DO  Admission Date:   Admission Orders (From admission, onward)       Ordered        12/08/24 1144  INPATIENT ADMISSION  Once            12/07/24 1827  Place in Observation  Once                          Discharge Date: 12/15/24      Hospital Course:   Yao Tipton is a 84 y.o. male patient who originally presented to the hospital on 12/7/2024 presenting secondary to acute on chronic combined systolic/diastolic  "congestive heart failure.  Patient was transition from IV Lasix to Demadex 40 mg twice daily on discharge.  Patient appears euvolemic, feeling well with no acute concerns.    While hospitalized acute urinary retention with traumatic Dupont insertion, gross hematuria.  Continue Dupont catheter until outpatient void trial with urology planned.    On discharge, urine clear, Eliquis has been restarted with no signs of overt bleeding.    Patient is very happy to be going home, recommend close follow-up with primary care physician for transition of care management within 7 days of today's date, follow-up with urology later this week, recommend repeat CBC, CMP, magnesium levels with the change of diuretic therapy within the week.    Condition at Discharge: stable    Discharge Day Visit / Exam:   Subjective: Patient is a pleasant 84-year-old male, seen and examined at bedside this morning, overall no concerns, would like to go home today.  Vitals: Blood Pressure: 120/64 (12/15/24 0749)  Pulse: 83 (12/15/24 0900)  Temperature: (!) 97.1 °F (36.2 °C) (12/15/24 0749)  Temp Source: Axillary (12/14/24 1807)  Respirations: 19 (12/14/24 2226)  Height: 5' 8\" (172.7 cm) (12/09/24 1010)  Weight - Scale: 62.3 kg (137 lb 5.6 oz) (12/15/24 0600)  SpO2: 98 % (12/15/24 0900)  Physical Exam  Vitals and nursing note reviewed.   Constitutional:       General: He is not in acute distress.     Appearance: He is well-developed. He is ill-appearing.   HENT:      Head: Normocephalic and atraumatic.   Eyes:      General: No scleral icterus.     Conjunctiva/sclera: Conjunctivae normal.   Cardiovascular:      Rate and Rhythm: Normal rate and regular rhythm.      Pulses: Normal pulses.      Heart sounds: Murmur heard.   Pulmonary:      Effort: Pulmonary effort is normal. No respiratory distress.      Breath sounds: Normal breath sounds.   Abdominal:      General: Bowel sounds are normal. There is no distension.      Palpations: Abdomen is soft.      " Tenderness: There is no abdominal tenderness.   Musculoskeletal:         General: Swelling present. Normal range of motion.      Cervical back: Neck supple.   Skin:     General: Skin is warm and dry.      Capillary Refill: Capillary refill takes less than 2 seconds.   Neurological:      General: No focal deficit present.      Mental Status: He is alert and oriented to person, place, and time. Mental status is at baseline.   Psychiatric:         Mood and Affect: Mood normal.         Behavior: Behavior normal.          Discussion with Family: Discussed with son who will be picking patient up.    Discharge instructions/Information to patient and family:   See after visit summary for information provided to patient and family.      Provisions for Follow-Up Care:  See after visit summary for information related to follow-up care and any pertinent home health orders.      Mobility at time of Discharge:   Basic Mobility Inpatient Raw Score: 17  JH-HLM Goal: 5: Stand one or more mins  JH-HLM Achieved: 2: Bed activities/Dependent transfer (pt refused to ambulate to chair)  HLM Goal achieved. Continue to encourage appropriate mobility.     Disposition:   Home    Discharge Medications:  See after visit summary for reconciled discharge medications provided to patient and/or family.      Administrative Statements   Discharge Statement:  I have spent a total time of 45 minutes in caring for this patient on the day of the visit/encounter. .    **Please Note: This note may have been constructed using a voice recognition system**

## 2024-12-15 NOTE — ASSESSMENT & PLAN NOTE
Noted per nursing on 12/9.  S/p Dupont's catheter insertion      Given traumatic catheter insertion, will continue catheter in the outpatient setting, patient will have appropriate follow-up for void trial/follow-up with urology in the outpatient setting.

## 2024-12-16 ENCOUNTER — PATIENT OUTREACH (OUTPATIENT)
Dept: CASE MANAGEMENT | Facility: OTHER | Age: 84
End: 2024-12-16

## 2024-12-16 ENCOUNTER — TELEPHONE (OUTPATIENT)
Dept: NEPHROLOGY | Facility: CLINIC | Age: 84
End: 2024-12-16

## 2024-12-16 DIAGNOSIS — I50.43 ACUTE ON CHRONIC COMBINED SYSTOLIC AND DIASTOLIC CONGESTIVE HEART FAILURE (HCC): ICD-10-CM

## 2024-12-16 DIAGNOSIS — Z71.89 COORDINATION OF COMPLEX CARE: Primary | ICD-10-CM

## 2024-12-16 DIAGNOSIS — N18.4 STAGE 4 CHRONIC KIDNEY DISEASE (HCC): Primary | ICD-10-CM

## 2024-12-16 NOTE — PROGRESS NOTES
Progress Note - Cardiology Office  [facility]    Yao Tipton 84 y.o. male MRN: [id number]  : 1940  Encounter: [id number]        Assessment & Plan  Chronic combined systolic and diastolic CHF (congestive heart failure) (HCC)  Denies any increase in dyspnea or significant weight change  not on ACE/ARB/Arni or Aldactone secondary to stage IV kidney disease  not on SGL T2 inhibitor secondary to kidney disease  not on beta blocker secondary to bradycardia  Follows with nephrology for managing his diuretics  low sodium diet    Essential hypertension  BP today is 110/80 with heart rate of 82/min.  Not on any antihypertensives except for Demadex    Dyslipidemia  Continue atorvastatin 40 mg  Chronic atrial fibrillation (HCC)  heart rates currently controlled  not on any AV donnie blocking medication due to periodic bradycardia  Continue Eliquis 2.5 mg bid  Chronic anemia  Diabetes mellitus with peripheral artery disease (HCC)    Lab Results   Component Value Date    HGBA1C 7.7 (H) 2024     Chronic kidney disease-mineral and bone disorder  Lab Results   Component Value Date    EGFR 26 12/15/2024    EGFR 27 2024    EGFR 26 2024    CREATININE 2.21 (H) 12/15/2024    CREATININE 2.11 (H) 2024    CREATININE 2.17 (H) 2024      Chronic anemia  baseline hemoglobin appears to be 10 to 12  monitor while in hospital    Thrombocytopenia (HCC)  chronic  Dyslipidemia  continue Lipitor 40 mg daily  Obtain lipid and hepatic function panel    Open wound of both lower extremities  Follow-up with wound care           RECOMMENDATIONS:  Continue Eliquis  On Demadex 40 mg twice daily as per nephrology  Follow-up with nephrology  Lipid and hepatic function panel  Not on ACE inhibitor, ARB, ARNI or MRA or SGLT2 inhibitor due to CKD not on beta-blocker due to periodic bradycardia    Please call [phone] if any questions.    HPI :     Yao Tipton is a 84 y.o. year old male who  came for follow up.  He claims to be feeling okay and denies any increase dyspnea from time of discharge.  His blood pressure is adequate at 110/80 and his heart rate is controlled at 83/min.  He has chronic atrial fibrillation and is anticoagulated with Eliquis    REVIEW OF SYSTEMS:  Has baseline dyspnea with no significant change  Denies chest pain or syncope  Has lower extremity edema and wounds, on home O2 and has difficulty in ambulation      Historical Information   Past Medical History:   Diagnosis Date    Atrial fibrillation (HCC)     BPH (benign prostatic hyperplasia)     CHF (congestive heart failure) (HCC)     Chronic kidney disease     Coronary artery disease     Diabetes mellitus (HCC)     Hyperlipidemia     Hypertension     Hyponatremia 12/07/2024     Past Surgical History:   Procedure Laterality Date    CARDIAC CATHETERIZATION N/A 6/30/2023    Procedure: Cardiac pericardiocentesis;  Surgeon: Shanna Adame DO;  Location: [facility];  Service: Cardiology    CARDIAC CATHETERIZATION N/A 6/30/2023    Procedure: Cardiac RHC;  Surgeon: Shanna Adame DO;  Location: [facility];  Service: Cardiology    EYE SURGERY      IR CHEST TUBE PLACEMENT  6/24/2023    IR CHEST TUBE PLACEMENT  7/28/2023    IR PLEURAL EFFUSION LONG-TERM CATHETER PLACEMENT  8/7/2023    IR PLEURAL EFFUSION LONG-TERM CATHETER REMOVAL  1/3/2024    IR THORACENTESIS  12/11/2023    SKIN CANCER EXCISION      TONSILLECTOMY       Social History     Substance and Sexual Activity   Alcohol Use Not Currently    Alcohol/week: 0.0 standard drinks of alcohol    Comment: special occasions     Social History     Substance and Sexual Activity   Drug Use Not Currently     Social History     Tobacco Use   Smoking Status Never   Smokeless Tobacco Former   Tobacco Comments    Smoked a pipe 50 years ago     Family History:   Family History   Problem Relation Age of Onset    Heart disease Mother     Diabetes Father     Vision loss Father      Cancer Sister     Diabetes Sister        Meds/Allergies     Allergies   Allergen Reactions    Elemental Sulfur     Sulfa Antibiotics        Current Outpatient Medications:     allopurinol (ZYLOPRIM) 100 mg tablet, Take 100 mg by mouth 2 (two) times a day, Disp: , Rfl:     ALPRAZolam (XANAX) 0.5 mg tablet, 0.5 mg daily at bedtime as needed for anxiety or sleep, Disp: , Rfl: 0    apixaban (Eliquis) 2.5 mg, Take 1 tablet (2.5 mg total) by mouth 2 (two) times a day, Disp: 60 tablet, Rfl: 11    ascorbic acid (VITAMIN C) 500 MG tablet, Take 1 tablet (500 mg total) by mouth daily, Disp: , Rfl: 0    atorvastatin (LIPITOR) 40 mg tablet, Take 1 tablet (40 mg total) by mouth daily with dinner, Disp: 30 tablet, Rfl: 0    Blood Pressure Monitor NILTON, by Does not apply route daily, Disp: 1 Device, Rfl: 0    docusate sodium (COLACE) 100 mg capsule, Take 1 capsule (100 mg total) by mouth 2 (two) times a day as needed for constipation, Disp: , Rfl: 0    famotidine (PEPCID) 10 mg tablet, Take 1 tablet (10 mg total) by mouth daily at bedtime, Disp: 30 tablet, Rfl: 0    ferrous sulfate 325 (65 Fe) mg tablet, Take 325 mg by mouth daily with breakfast, Disp: , Rfl:     glimepiride (AMARYL) 2 mg tablet, Take 1 tablet (2 mg total) by mouth daily with dinner, Disp: 30 tablet, Rfl: 0    halobetasol (ULTRAVATE) 0.05 % cream, , Disp: , Rfl:     insulin aspart (NovoLOG FlexPen) 100 UNIT/ML injection pen, , Disp: , Rfl:     latanoprost (XALATAN) 0.005 % ophthalmic solution, Administer 1 drop to both eyes daily at bedtime, Disp: , Rfl:     Sure Comfort Pen Needles 32G X 4 MM MISC, , Disp: , Rfl:     tamsulosin (FLOMAX) 0.4 mg, Take 0.4 mg by mouth daily with dinner, Disp: , Rfl:     timolol (TIMOPTIC) 0.5 % ophthalmic solution, Administer 1 drop to both eyes daily, Disp: , Rfl:     torsemide (DEMADEX) 20 mg tablet, Take 2 tablets (40 mg total) by mouth 2 (two) times a day, Disp: 60 tablet, Rfl: 0    ZINC OXIDE PO, Take by mouth daily, Disp: ,  "Rfl:     cholecalciferol (VITAMIN D3) 1,000 units tablet, Take 2 tablets (2,000 Units total) by mouth daily Do not start before 2023., Disp: 60 tablet, Rfl: 0    Vitals: Blood pressure 110/80, pulse 83, height 5' 8\" (1.727 m), weight 62.1 kg (137 lb 0.3 oz), SpO2 99%.    Body mass index is 20.83 kg/m².  Vitals:    24 1304   Weight: 62.1 kg (137 lb 0.3 oz)     BP Readings from Last 3 Encounters:   24 110/80   12/15/24 120/64   24 134/78       Physical Exam:  Physical Exam    Neurologic:  Alert & oriented x 3, no new focal deficits, Not in any acute distress,  Constitutional: Appears chronically ill  Eyes:  Pupil equal and reacting to light, conjunctiva normal,   HENT:  Atraumatic, oropharynx moist, Neck- normal range of motion, no tenderness,  Neck supple, No JVP, No LNP   Respiratory: Decreased breath sounds in the bases with few crepitations  Cardiovascular: S1-S2, irregularly irregular with a I/VI systolic murmur   GI:  Soft, nondistended, normal bowel sounds, nontender, no hepatosplenomegaly appreciated.  Musculoskeletal:  No tenderness, no deformities.   Skin:  Well hydrated, no rash   Lymphatic:  No lymphadenopathy noted   Extremities: Mild lower extremity edema        Diagnostic Studies Review Cardio:      EKG: Atrial fibrillation with PVCs/aberrantly conducted complex, heart rate 63/min, nonspecific ST and T wave abnormality    Cardiac testing:   Results for orders placed during the hospital encounter of 21    Echo complete with contrast if indicated    Narrative  [address] [id number] [phone]    Transthoracic Echocardiogram  2D, M-mode, Doppler, and Color Doppler    Study date:  2021    Patient: HATTIE LAWTON  MR number: [id number]  Account number: [id number]  : 1940  Age: 80 years  Gender: Male  Status: Outpatient  Location: Echo lab  Height: 69 in  Weight: 156.6 lb  BP: 152/ 80 " mmHg    Indications: Chronic Systolic CHF/Leg Edema    Diagnoses: 428.0 - CONGESTIVE HEART FAILURE    Sonographer:  PELON Galindo  Primary Physician:  Nicho Baltazar DO  Referring Physician:  Erickson Ramos DO  Group:  [facility]  Interpreting Physician:  Erickson Ramos DO    SUMMARY    LEFT VENTRICLE:  Systolic function was mildly to moderately reduced by visual assessment. Ejection fraction was estimated in the range of 40 % to 45 %.  There was mild diffuse hypokinesis.  There was moderate concentric hypertrophy.  Features were consistent with a pseudonormal left ventricular filling pattern, with concomitant abnormal relaxation and increased filling pressure (grade 2 diastolic dysfunction).    LEFT ATRIUM:  The atrium was mildly dilated.    MITRAL VALVE:  There was mild regurgitation.    AORTIC VALVE:  There was no evidence for stenosis.  There was no regurgitation.    TRICUSPID VALVE:  There was moderate regurgitation.  Pulmonary artery systolic pressure was moderately increased.    PERICARDIUM:  A moderate to large, free-flowing pericardial effusion was identified circumferential to the heart. The fluid had no internal echoes. There was no evidence of hemodynamic compromise.    COMPARISONS:  There has been no significant interval change. Pericardial effusion is overall unchanged. There is no evidence of tamponade present. Comparison was made with the previous study of 11-Jan-2019.    HISTORY: PRIOR HISTORY: AFIB,CKD,CAD,Diabetes,HLD,HTN,PVC.Pericardial effusion    PROCEDURE: The procedure was performed in the echo lab. This was a routine study. The transthoracic approach was used. The study included complete 2D imaging, M-mode, complete spectral Doppler, and color Doppler. The heart rate was 68 bpm,  at the start of the study. Images were obtained from the parasternal, apical, subcostal, and suprasternal notch acoustic windows. Image quality was adequate.    LEFT VENTRICLE: Size was  normal. Systolic function was mildly to moderately reduced by visual assessment. Ejection fraction was estimated in the range of 40 % to 45 %. There was mild diffuse hypokinesis. There was moderate concentric  hypertrophy. DOPPLER: Features were consistent with a pseudonormal left ventricular filling pattern, with concomitant abnormal relaxation and increased filling pressure (grade 2 diastolic dysfunction).    RIGHT VENTRICLE: The size was normal. Systolic function was normal. DOPPLER: Systolic pressure was within the normal range.    LEFT ATRIUM: The atrium was mildly dilated.    RIGHT ATRIUM: Size was normal.    MITRAL VALVE: Valve structure was normal. There was normal leaflet separation. No echocardiographic evidence for prolapse. DOPPLER: The transmitral velocity was within the normal range. There was no evidence for stenosis. There was mild  regurgitation.    AORTIC VALVE: The valve was trileaflet. Leaflets exhibited normal thickness and normal cuspal separation. DOPPLER: Transaortic velocity was within the normal range. There was no evidence for stenosis. There was no regurgitation.    TRICUSPID VALVE: The valve structure was normal. There was normal leaflet separation. DOPPLER: The transtricuspid velocity was within the normal range. There was moderate regurgitation. Pulmonary artery systolic pressure was moderately  increased. Estimated peak PA pressure was 55 mmHg.    PULMONIC VALVE: Leaflets exhibited normal thickness, no calcification, and normal cuspal separation. DOPPLER: The transpulmonic velocity was within the normal range. There was mild regurgitation.    PERICARDIUM: There was no thickening. A moderate to large, free-flowing pericardial effusion was identified circumferential to the heart. The fluid had no internal echoes. There was no evidence of hemodynamic compromise.    AORTA: The root exhibited normal size.    PULMONARY ARTERY: The size was normal. The morphology appeared normal.    SYSTEM  MEASUREMENT TABLES    2D  EF (Teich): 41.74 %  %FS: 20.2 %  Ao Diam: 3.38 cm  EDV(Teich): 75.16 ml  ESV(Teich): 43.78 ml  HR_2Ch_Q: 53.32 bpm  IVSd: 0.99 cm  LA Area: 24.26 cm2  LA Diam: 4.22 cm  LVCO_2Ch_Q: 1.71 L/min  LVEF_2Ch_Q: 44.12 %  LVIDd: 4.12 cm  LVIDs: 3.29 cm  LVLd_2Ch_Q: 6.35 cm  LVLs_2Ch_Q: 5.57 cm  LVOT Diam: 1.87 cm  LVPWd: 1.15 cm  LVSV_2Ch_Q: 32.03 ml  LVVED_2Ch_Q: 72.58 ml  LVVES_2Ch_Q: 40.56 ml  RA Area: 16.3 cm2  RVIDd: 2.53 cm  SV (Teich): 31.37 ml    CW  TR Vmax: 3.42 m/s  TR maxP.89 mmHg    MM  TAPSE: 1.19 cm    PW  E' Lat: 0.11 m/s  E' Sept: 0.08 m/s    [facility]    Prepared and electronically signed by    Erickson Ramos DO  Signed 2021 16:42:17        Results for orders placed during the hospital encounter of 24    Echo complete w/ contrast if indicated    Interpretation Summary    Left Ventricle: Left ventricular cavity size is normal. Wall thickness is increased. The left ventricular ejection fraction is 45%. Systolic function is mildly reduced. There is mild global hypokinesis. Diastolic function is abnormal.    Right Ventricle: Right ventricular cavity size is normal. Systolic function is mildly reduced. Wall thickness is increased.    Left Atrium: The atrium is mildly dilated.    Right Atrium: The atrium is mildly dilated.    Aortic Valve: The leaflets are mildly thickened. The leaflets are mildly calcified. There is moderately reduced mobility.      Results for orders placed during the hospital encounter of 01/10/19    NM Myocardial Perfusion Spect (Exercise Induced Stress and/or Rest)    Narrative  [address] [address] [phone]    Rest/Stress Gated SPECT Myocardial Perfusion Imaging After Regadenoson    Patient: HATTIE LAWTON  MR number: [id number]  Account number: [id number]  : 1940  Age: 78 years  Gender: Male  Status: Inpatient  Location:  Stress lab  Height: 69 in  Weight: 166 lb  BP: 187/ 80 mmHg    Allergies: SULFA ANTIBIOTICS, SULFUR    Diagnosis: R06.02 - Shortness of breath    Primary Physician:  Nicho Baltazar DO  Technician:  Marianna Varela  RN:  VLADIMIR Ribera  Group:  [facility]  Report Prepared By::  VLADIMIR Ribera  Interpreting Physician:  Norma Nava MD    INDICATIONS: Evaluation for coronary artery disease.    HISTORY: The patient is a 78 year old  male. Chest pain status: no chest pain. Other symptoms: dyspnea and edema. Coronary artery disease risk factors: hypertension, family history of premature coronary artery disease, and  diabetes mellitus. Cardiovascular history: pericardial effusion.    PHYSICAL EXAM: Baseline physical exam screening: no wheezes audible.    REST ECG: Normal sinus rhythm. The ECG showed occasional premature ventricular contractions.    PROCEDURE: The study was performed in the the Stress lab. A regadenoson infusion pharmacologic stress test was performed. Gated SPECT myocardial perfusion imaging was performed after stress and at rest. Systolic blood pressure was 187  mmHg, at the start of the study. Diastolic blood pressure was 80 mmHg, at the start of the study. The heart rate was 73 bpm, at the start of the study. IV double checked.  Regadenoson protocol:  Time HR bpm SBP mmHg DBP mmHg Symptoms ST change Rhythm/conduct  Baseline 10:40 72 187 80 none none NSR, frequent PVC's  Immediate 10:48 77 188 72 dizziness -- same as above  1 min 10:49 81 172 72 same as above -- ventricular bigeminy  2 min 10:50 76 151 68 subsiding -- --  3 min 10:51 77 130 70 none -- ventricular bigeminy  4 min 10:52 75 140 70 none -- --  No medications or fluids given.    STRESS SUMMARY: Duration of pharmacologic stress was 3 min. Maximal heart rate during stress was 87 bpm. The heart rate response to stress was normal. There was resting hypertension with an appropriate blood pressure response to stress.  The  rate-pressure product for the peak heart rate and blood pressure was [id number]. There was no chest pain during stress. The stress test was terminated due to protocol completion. Pre oxygen saturation: 98 %. Peak oxygen saturation: 98 %.  Arrhythmia during stress: isolated premature ventricular beats.    ISOTOPE ADMINISTRATION:  Resting isotope administration Stress isotope administration  Agent Tetrofosmin Tetrofosmin  Dose 10.8 mCi 32.6 mCi  Date 01/14/2019 01/14/2019  Injection time 08:30 10:50    The radiopharmaceutical was injected at the peak effect of pharmacologic stress.    MYOCARDIAL PERFUSION IMAGING:  The image quality was fair. The left ventricle was mildly dilated. The TID ratio was .9.    PERFUSION DEFECTS:  -  There was a moderate-sized, moderately severe, fixed myocardial perfusion defect of the entire inferolateral wall.    GATED SPECT:  The calculated left ventricular ejection fraction was 37 %. Left ventricular ejection fraction was moderately decreased by visual estimate. There was moderately reduced myocardial thickening and motion of the lateral wall of the left  ventricle.    SUMMARY:  -  Stress results: There was resting hypertension with an appropriate blood pressure response to stress. There was no chest pain during stress.  -  Perfusion imaging: There was a moderate-sized, moderately severe, fixed myocardial perfusion defect of the entire inferolateral wall.  -  Gated SPECT: The calculated left ventricular ejection fraction was 37 %. Left ventricular ejection fraction was moderately decreased by visual estimate. There was moderately reduced myocardial thickening and motion of the lateral wall  of the left ventricle.  -  Impressions and recommendations: Abnormal study after pharmacologic vasodilation with fixed inferolateral wall defect, no clear ischemia. EF 37%. Can not rule out old inferolateral wall infarction.    IMPRESSIONS: Abnormal study after pharmacologic vasodilation with fixed  "inferolateral wall defect, no clear ischemia. EF 37%. Can not rule out old inferolateral wall infarction. There was a moderate-sized infarct. Left ventricular systolic  function was reduced, with regional wall motion abnormalities.    Prepared and signed by    Norma Nava MD  Signed 01/14/2019 17:13:54        Imaging:  Chest X-Ray:   No Chest XR results available for this patient.    CT-scan of the chest:     No CTA results available for this patient.  Lab Review   Lab Results   Component Value Date    WBC 5.30 12/15/2024    HGB 10.4 (L) 12/15/2024    HCT 31.5 (L) 12/15/2024    MCV 99 (H) 12/15/2024    RDW 16.6 (H) 12/15/2024    PLT 79 (L) 12/15/2024     BMP:  Lab Results   Component Value Date    SODIUM 135 12/15/2024    K 4.4 12/15/2024    CL 96 12/15/2024    CO2 33 (H) 12/15/2024    BUN 62 (H) 12/15/2024    CREATININE 2.21 (H) 12/15/2024    GLUC 137 12/15/2024    GLUF 108 (H) 12/08/2024    CALCIUM 7.9 (L) 12/15/2024    CORRECTEDCA 8.8 12/15/2024    EGFR 26 12/15/2024    MG 2.1 12/14/2024     LFT:  Lab Results   Component Value Date    AST 32 12/15/2024    ALT 38 12/15/2024    ALKPHOS 132 (H) 12/15/2024    TP 5.3 (L) 12/15/2024    ALB 2.9 (L) 12/15/2024      No components found for: \"TSH3\"  Lab Results   Component Value Date    YUR6GWAPCYLX 3.500 08/19/2024     Lab Results   Component Value Date    HGBA1C 7.7 (H) 12/07/2024     Lipid Profile:   Lab Results   Component Value Date    CHOLESTEROL 85 08/20/2021    HDL 37 (A) 08/20/2021    LDLCALC 54 01/15/2019    TRIG 63 08/20/2021     Lab Results   Component Value Date    CHOLESTEROL 85 08/20/2021    CHOLESTEROL 102 01/15/2019     Lab Results   Component Value Date    TROPONINI 0.10 (H) 01/11/2019     Lab Results   Component Value Date    NTBNP 26,999 (H) 01/10/2019      Recent Results (from the past 4 weeks)   ECG 12 lead    Collection Time: 12/07/24  2:21 PM   Result Value Ref Range    Ventricular Rate 77 BPM    Atrial Rate 416 BPM    SC Interval  ms    QRSD " Interval 88 ms    QT Interval 408 ms    QTC Interval 461 ms    P Axis  degrees    QRS Axis 33 degrees    T Wave Axis 43 degrees   CBC and differential    Collection Time: 12/07/24  2:39 PM   Result Value Ref Range    WBC 6.13 4.31 - 10.16 Thousand/uL    RBC 3.38 (L) 3.88 - 5.62 Million/uL    Hemoglobin 10.9 (L) 12.0 - 17.0 g/dL    Hematocrit 34.0 (L) 36.5 - 49.3 %     (H) 82 - 98 fL    MCH 32.2 26.8 - 34.3 pg    MCHC 32.1 31.4 - 37.4 g/dL    RDW 16.5 (H) 11.6 - 15.1 %    MPV 9.7 8.9 - 12.7 fL    Platelets 100 (L) 149 - 390 Thousands/uL    nRBC 0 /100 WBCs    Segmented % 77 (H) 43 - 75 %    Immature Grans % 1 0 - 2 %    Lymphocytes % 12 (L) 14 - 44 %    Monocytes % 10 4 - 12 %    Eosinophils Relative 0 0 - 6 %    Basophils Relative 0 0 - 1 %    Absolute Neutrophils 4.73 1.85 - 7.62 Thousands/µL    Absolute Immature Grans 0.03 0.00 - 0.20 Thousand/uL    Absolute Lymphocytes 0.71 0.60 - 4.47 Thousands/µL    Absolute Monocytes 0.62 0.17 - 1.22 Thousand/µL    Eosinophils Absolute 0.02 0.00 - 0.61 Thousand/µL    Basophils Absolute 0.02 0.00 - 0.10 Thousands/µL   Comprehensive metabolic panel    Collection Time: 12/07/24  2:39 PM   Result Value Ref Range    Sodium 131 (L) 135 - 147 mmol/L    Potassium 4.6 3.5 - 5.3 mmol/L    Chloride 97 96 - 108 mmol/L    CO2 27 21 - 32 mmol/L    ANION GAP 7 4 - 13 mmol/L    BUN 63 (H) 5 - 25 mg/dL    Creatinine 1.97 (H) 0.60 - 1.30 mg/dL    Glucose 244 (H) 65 - 140 mg/dL    Calcium 8.4 8.4 - 10.2 mg/dL    Corrected Calcium 9.0 8.3 - 10.1 mg/dL    AST 24 13 - 39 U/L    ALT 24 7 - 52 U/L    Alkaline Phosphatase 141 (H) 34 - 104 U/L    Total Protein 6.1 (L) 6.4 - 8.4 g/dL    Albumin 3.3 (L) 3.5 - 5.0 g/dL    Total Bilirubin 0.69 0.20 - 1.00 mg/dL    eGFR 29 ml/min/1.73sq m   FLU/COVID Rapid Antigen (30 min. TAT) - Preferred screening test in ED    Collection Time: 12/07/24  2:39 PM    Specimen: Nose; Nares   Result Value Ref Range    SARS COV Rapid Antigen Negative Negative     "Influenza A Rapid Antigen Negative Negative    Influenza B Rapid Antigen Negative Negative   HS Troponin 0hr (reflex protocol)    Collection Time: 12/07/24  2:39 PM   Result Value Ref Range    hs TnI 0hr 14 \"Refer to ACS Flowchart\"- see link ng/L   B-Type Natriuretic Peptide(BNP)    Collection Time: 12/07/24  2:39 PM   Result Value Ref Range     (H) 0 - 100 pg/mL   Magnesium    Collection Time: 12/07/24  2:39 PM   Result Value Ref Range    Magnesium 2.2 1.9 - 2.7 mg/dL   Hemoglobin A1C    Collection Time: 12/07/24  2:39 PM   Result Value Ref Range    Hemoglobin A1C 7.7 (H) Normal 4.0-5.6%; PreDiabetic 5.7-6.4%; Diabetic >=6.5%; Glycemic control for adults with diabetes <7.0% %     mg/dl   HS Troponin I 2hr    Collection Time: 12/07/24  4:36 PM   Result Value Ref Range    hs TnI 2hr 15 \"Refer to ACS Flowchart\"- see link ng/L    Delta 2hr hsTnI 1 <20 ng/L   Fingerstick Glucose (POCT)    Collection Time: 12/07/24  9:06 PM   Result Value Ref Range    POC Glucose 215 (H) 65 - 140 mg/dl   ECG 12 lead    Collection Time: 12/08/24  3:37 AM   Result Value Ref Range    Ventricular Rate 66 BPM    Atrial Rate  BPM    AR Interval  ms    QRSD Interval 82 ms    QT Interval 420 ms    QTC Interval 440 ms    P Axis  degrees    QRS Axis 13 degrees    T Wave Axis 238 degrees   ECG 12 lead    Collection Time: 12/08/24  3:51 AM   Result Value Ref Range    Ventricular Rate 62 BPM    Atrial Rate  BPM    AR Interval  ms    QRSD Interval 84 ms    QT Interval 384 ms    QTC Interval 390 ms    P Axis  degrees    QRS Axis 11 degrees    T Wave Axis 58 degrees   Basic metabolic panel    Collection Time: 12/08/24  4:34 AM   Result Value Ref Range    Sodium 137 135 - 147 mmol/L    Potassium 4.3 3.5 - 5.3 mmol/L    Chloride 100 96 - 108 mmol/L    CO2 32 21 - 32 mmol/L    ANION GAP 5 4 - 13 mmol/L    BUN 65 (H) 5 - 25 mg/dL    Creatinine 1.95 (H) 0.60 - 1.30 mg/dL    Glucose 108 65 - 140 mg/dL    Glucose, Fasting 108 (H) 65 - 99 mg/dL    " Calcium 8.3 (L) 8.4 - 10.2 mg/dL    eGFR 30 ml/min/1.73sq m   CBC (With Platelets)    Collection Time: 12/08/24  4:34 AM   Result Value Ref Range    WBC 4.19 (L) 4.31 - 10.16 Thousand/uL    RBC 3.05 (L) 3.88 - 5.62 Million/uL    Hemoglobin 9.9 (L) 12.0 - 17.0 g/dL    Hematocrit 30.5 (L) 36.5 - 49.3 %     (H) 82 - 98 fL    MCH 32.5 26.8 - 34.3 pg    MCHC 32.5 31.4 - 37.4 g/dL    RDW 16.5 (H) 11.6 - 15.1 %    Platelets 93 (L) 149 - 390 Thousands/uL    MPV 9.6 8.9 - 12.7 fL   Magnesium    Collection Time: 12/08/24  4:34 AM   Result Value Ref Range    Magnesium 2.2 1.9 - 2.7 mg/dL   D-dimer, quantitative    Collection Time: 12/08/24  4:34 AM   Result Value Ref Range    D-Dimer, Quant 2.26 (H) <0.50 ug/ml FEU   Fingerstick Glucose (POCT)    Collection Time: 12/08/24  7:19 AM   Result Value Ref Range    POC Glucose 65 65 - 140 mg/dl   Fingerstick Glucose (POCT)    Collection Time: 12/08/24  7:49 AM   Result Value Ref Range    POC Glucose 72 65 - 140 mg/dl   Fingerstick Glucose (POCT)    Collection Time: 12/08/24 11:15 AM   Result Value Ref Range    POC Glucose 120 65 - 140 mg/dl   Fingerstick Glucose (POCT)    Collection Time: 12/08/24  3:39 PM   Result Value Ref Range    POC Glucose 160 (H) 65 - 140 mg/dl   Fingerstick Glucose (POCT)    Collection Time: 12/08/24  8:57 PM   Result Value Ref Range    POC Glucose 117 65 - 140 mg/dl   CBC    Collection Time: 12/09/24  4:53 AM   Result Value Ref Range    WBC 5.22 4.31 - 10.16 Thousand/uL    RBC 3.09 (L) 3.88 - 5.62 Million/uL    Hemoglobin 9.7 (L) 12.0 - 17.0 g/dL    Hematocrit 30.7 (L) 36.5 - 49.3 %    MCV 99 (H) 82 - 98 fL    MCH 31.4 26.8 - 34.3 pg    MCHC 31.6 31.4 - 37.4 g/dL    RDW 16.6 (H) 11.6 - 15.1 %    Platelets 89 (L) 149 - 390 Thousands/uL    MPV 9.5 8.9 - 12.7 fL   Comprehensive metabolic panel    Collection Time: 12/09/24  4:53 AM   Result Value Ref Range    Sodium 136 135 - 147 mmol/L    Potassium 4.0 3.5 - 5.3 mmol/L    Chloride 100 96 - 108 mmol/L     CO2 28 21 - 32 mmol/L    ANION GAP 8 4 - 13 mmol/L    BUN 60 (H) 5 - 25 mg/dL    Creatinine 1.94 (H) 0.60 - 1.30 mg/dL    Glucose 36 (LL) 65 - 140 mg/dL    Calcium 8.0 (L) 8.4 - 10.2 mg/dL    Corrected Calcium 8.6 8.3 - 10.1 mg/dL    AST 22 13 - 39 U/L    ALT 22 7 - 52 U/L    Alkaline Phosphatase 122 (H) 34 - 104 U/L    Total Protein 5.5 (L) 6.4 - 8.4 g/dL    Albumin 3.3 (L) 3.5 - 5.0 g/dL    Total Bilirubin 0.74 0.20 - 1.00 mg/dL    eGFR 30 ml/min/1.73sq m   Fingerstick Glucose (POCT)    Collection Time: 12/09/24  5:51 AM   Result Value Ref Range    POC Glucose 35 (LL) 65 - 140 mg/dl   Fingerstick Glucose (POCT)    Collection Time: 12/09/24  6:06 AM   Result Value Ref Range    POC Glucose 52 (L) 65 - 140 mg/dl   Fingerstick Glucose (POCT)    Collection Time: 12/09/24  6:25 AM   Result Value Ref Range    POC Glucose 152 (H) 65 - 140 mg/dl   Fingerstick Glucose (POCT)    Collection Time: 12/09/24  7:36 AM   Result Value Ref Range    POC Glucose 203 (H) 65 - 140 mg/dl   Echo complete w/ contrast if indicated    Collection Time: 12/09/24 10:50 AM   Result Value Ref Range    RAA A4C 17.8 cm2    MV Peak E Mitchell 49 cm/s    RVID d 3.1 cm    A4C EF 45 %    Left ventricular stroke volume (2D) 48.00 mL    IVSd 1.10 cm    Tricuspid annular plane systolic excursion 1.30 cm    Ao root 3.70 cm    LVPWd 1.00 cm    LA size 4.3 cm    FS 27 28 - 44    LVIDS 3.30 cm    IVS 1.1 cm    LVIDd 4.50 cm    LEFT VENTRICLE SYSTOLIC VOLUME (MOD BIPLANE) 2D 45 mL    LV DIASTOLIC VOLUME (MOD BIPLANE) 2D 93 mL    Left Atrium Area-systolic Four Chamber 28.7 cm2    MV E' Tissue Velocity Lateral 6 cm/s    MV E' Tissue Velocity Septal 6 cm/s    LVSV, 2D 48 mL    BSA 1.76 m2    LV EF 45    Fingerstick Glucose (POCT)    Collection Time: 12/09/24 11:49 AM   Result Value Ref Range    POC Glucose 166 (H) 65 - 140 mg/dl   Fingerstick Glucose (POCT)    Collection Time: 12/09/24  4:08 PM   Result Value Ref Range    POC Glucose 86 65 - 140 mg/dl   Fingerstick  Glucose (POCT)    Collection Time: 12/09/24  8:02 PM   Result Value Ref Range    POC Glucose 148 (H) 65 - 140 mg/dl   Phosphorus    Collection Time: 12/10/24  5:13 AM   Result Value Ref Range    Phosphorus 3.2 2.3 - 4.1 mg/dL   Magnesium    Collection Time: 12/10/24  5:13 AM   Result Value Ref Range    Magnesium 2.1 1.9 - 2.7 mg/dL   Basic metabolic panel    Collection Time: 12/10/24  5:13 AM   Result Value Ref Range    Sodium 135 135 - 147 mmol/L    Potassium 4.0 3.5 - 5.3 mmol/L    Chloride 99 96 - 108 mmol/L    CO2 31 21 - 32 mmol/L    ANION GAP 5 4 - 13 mmol/L    BUN 57 (H) 5 - 25 mg/dL    Creatinine 1.95 (H) 0.60 - 1.30 mg/dL    Glucose 33 (LL) 65 - 140 mg/dL    Calcium 8.0 (L) 8.4 - 10.2 mg/dL    eGFR 30 ml/min/1.73sq m   Fingerstick Glucose (POCT)    Collection Time: 12/10/24  6:20 AM   Result Value Ref Range    POC Glucose 30 (LL) 65 - 140 mg/dl   Fingerstick Glucose (POCT)    Collection Time: 12/10/24  6:41 AM   Result Value Ref Range    POC Glucose 116 65 - 140 mg/dl   Fingerstick Glucose (POCT)    Collection Time: 12/10/24  7:10 AM   Result Value Ref Range    POC Glucose 129 65 - 140 mg/dl   CBC and differential    Collection Time: 12/10/24 10:50 AM   Result Value Ref Range    WBC 6.69 4.31 - 10.16 Thousand/uL    RBC 3.05 (L) 3.88 - 5.62 Million/uL    Hemoglobin 9.8 (L) 12.0 - 17.0 g/dL    Hematocrit 30.8 (L) 36.5 - 49.3 %     (H) 82 - 98 fL    MCH 32.1 26.8 - 34.3 pg    MCHC 31.8 31.4 - 37.4 g/dL    RDW 16.7 (H) 11.6 - 15.1 %    MPV 9.6 8.9 - 12.7 fL    Platelets 85 (L) 149 - 390 Thousands/uL    nRBC 0 /100 WBCs    Segmented % 85 (H) 43 - 75 %    Immature Grans % 0 0 - 2 %    Lymphocytes % 9 (L) 14 - 44 %    Monocytes % 6 4 - 12 %    Eosinophils Relative 0 0 - 6 %    Basophils Relative 0 0 - 1 %    Absolute Neutrophils 5.65 1.85 - 7.62 Thousands/µL    Absolute Immature Grans 0.02 0.00 - 0.20 Thousand/uL    Absolute Lymphocytes 0.62 0.60 - 4.47 Thousands/µL    Absolute Monocytes 0.38 0.17 - 1.22  Thousand/µL    Eosinophils Absolute 0.01 0.00 - 0.61 Thousand/µL    Basophils Absolute 0.01 0.00 - 0.10 Thousands/µL   Fingerstick Glucose (POCT)    Collection Time: 12/10/24 11:37 AM   Result Value Ref Range    POC Glucose 153 (H) 65 - 140 mg/dl   UA w Reflex to Microscopic w Reflex to Culture    Collection Time: 12/10/24  4:06 PM    Specimen: Urine, Indwelling Dupont Catheter   Result Value Ref Range    Color, UA Red     Clarity, UA Clear     Specific Gravity, UA 1.020 1.005 - 1.030    pH, UA 6.0 4.5, 5.0, 5.5, 6.0, 6.5, 7.0, 7.5, 8.0    Leukocytes, UA Large (A) Negative    Nitrite, UA Negative Negative    Protein,  (2+) (A) Negative mg/dl    Glucose, UA Negative Negative mg/dl    Ketones, UA Negative Negative mg/dl    Urobilinogen, UA <2.0 <2.0 mg/dl mg/dl    Bilirubin, UA Negative Negative    Occult Blood, UA Large (A) Negative   Urine Microscopic    Collection Time: 12/10/24  4:06 PM   Result Value Ref Range    RBC, UA Innumerable (A) None Seen, 0-1, 1-2, 2-4, 0-5 /hpf    WBC, UA 10-20 (A) None Seen, 0-1, 1-2, 0-5, 2-4 /hpf    Epithelial Cells None Seen None Seen, Occasional /hpf    Bacteria, UA Occasional None Seen, Occasional /hpf   Urine culture    Collection Time: 12/10/24  4:06 PM    Specimen: Urine, Indwelling Dupont Catheter   Result Value Ref Range    Urine Culture >100,000 cfu/ml Proteus mirabilis (A)        Susceptibility    Proteus mirabilis - LISANDRA     ZID Performed Yes       Ampicillin ($$) <=8.00 Susceptible ug/ml     Aztreonam ($$$)  <=4 Susceptible ug/ml     Cefazolin ($) 4.00 Susceptible ug/ml     Ciprofloxacin ($)  <=0.25 Susceptible ug/ml     Gentamicin ($$) <=2 Susceptible ug/ml     Levofloxacin ($) <=0.50 Susceptible ug/ml     Nitrofurantoin >64 Resistant ug/ml     Tetracycline >8 Resistant ug/ml     Trimethoprim + Sulfamethoxazole ($$$) <=0.5/9.5 Susceptible ug/ml   Fingerstick Glucose (POCT)    Collection Time: 12/10/24  4:07 PM   Result Value Ref Range    POC Glucose 165 (H) 65 - 140  mg/dl   Fingerstick Glucose (POCT)    Collection Time: 12/10/24  8:34 PM   Result Value Ref Range    POC Glucose 187 (H) 65 - 140 mg/dl   CBC    Collection Time: 12/11/24  5:00 AM   Result Value Ref Range    WBC 4.10 (L) 4.31 - 10.16 Thousand/uL    RBC 3.09 (L) 3.88 - 5.62 Million/uL    Hemoglobin 9.8 (L) 12.0 - 17.0 g/dL    Hematocrit 30.4 (L) 36.5 - 49.3 %    MCV 98 82 - 98 fL    MCH 31.7 26.8 - 34.3 pg    MCHC 32.2 31.4 - 37.4 g/dL    RDW 16.5 (H) 11.6 - 15.1 %    Platelets 84 (L) 149 - 390 Thousands/uL    MPV 10.6 8.9 - 12.7 fL   Phosphorus    Collection Time: 12/11/24  5:00 AM   Result Value Ref Range    Phosphorus 3.4 2.3 - 4.1 mg/dL   Magnesium    Collection Time: 12/11/24  5:00 AM   Result Value Ref Range    Magnesium 2.0 1.9 - 2.7 mg/dL   Basic metabolic panel    Collection Time: 12/11/24  5:00 AM   Result Value Ref Range    Sodium 133 (L) 135 - 147 mmol/L    Potassium 4.0 3.5 - 5.3 mmol/L    Chloride 97 96 - 108 mmol/L    CO2 29 21 - 32 mmol/L    ANION GAP 7 4 - 13 mmol/L    BUN 56 (H) 5 - 25 mg/dL    Creatinine 1.96 (H) 0.60 - 1.30 mg/dL    Glucose 93 65 - 140 mg/dL    Calcium 7.9 (L) 8.4 - 10.2 mg/dL    eGFR 30 ml/min/1.73sq m   Fingerstick Glucose (POCT)    Collection Time: 12/11/24  7:13 AM   Result Value Ref Range    POC Glucose 80 65 - 140 mg/dl   Fingerstick Glucose (POCT)    Collection Time: 12/11/24 11:33 AM   Result Value Ref Range    POC Glucose 183 (H) 65 - 140 mg/dl   Fingerstick Glucose (POCT)    Collection Time: 12/11/24  3:58 PM   Result Value Ref Range    POC Glucose 198 (H) 65 - 140 mg/dl   Fingerstick Glucose (POCT)    Collection Time: 12/11/24  9:20 PM   Result Value Ref Range    POC Glucose 242 (H) 65 - 140 mg/dl   CBC    Collection Time: 12/12/24  4:48 AM   Result Value Ref Range    WBC 6.17 4.31 - 10.16 Thousand/uL    RBC 3.14 (L) 3.88 - 5.62 Million/uL    Hemoglobin 10.2 (L) 12.0 - 17.0 g/dL    Hematocrit 32.7 (L) 36.5 - 49.3 %     (H) 82 - 98 fL    MCH 32.5 26.8 - 34.3 pg     MCHC 31.2 (L) 31.4 - 37.4 g/dL    RDW 16.9 (H) 11.6 - 15.1 %    Platelets 81 (L) 149 - 390 Thousands/uL    MPV 10.2 8.9 - 12.7 fL   Phosphorus    Collection Time: 12/12/24  4:48 AM   Result Value Ref Range    Phosphorus 3.0 2.3 - 4.1 mg/dL   Magnesium    Collection Time: 12/12/24  4:48 AM   Result Value Ref Range    Magnesium 2.2 1.9 - 2.7 mg/dL   Basic metabolic panel    Collection Time: 12/12/24  4:48 AM   Result Value Ref Range    Sodium 131 (L) 135 - 147 mmol/L    Potassium 4.6 3.5 - 5.3 mmol/L    Chloride 97 96 - 108 mmol/L    CO2 28 21 - 32 mmol/L    ANION GAP 6 4 - 13 mmol/L    BUN 55 (H) 5 - 25 mg/dL    Creatinine 2.01 (H) 0.60 - 1.30 mg/dL    Glucose 294 (H) 65 - 140 mg/dL    Calcium 8.3 (L) 8.4 - 10.2 mg/dL    eGFR 29 ml/min/1.73sq m   Vitamin B12    Collection Time: 12/12/24  4:48 AM   Result Value Ref Range    Vitamin B-12 574 180 - 914 pg/mL   Folate    Collection Time: 12/12/24  4:48 AM   Result Value Ref Range    Folate 18.1 >5.9 ng/mL   Fingerstick Glucose (POCT)    Collection Time: 12/12/24  7:19 AM   Result Value Ref Range    POC Glucose 247 (H) 65 - 140 mg/dl   Fingerstick Glucose (POCT)    Collection Time: 12/12/24 11:02 AM   Result Value Ref Range    POC Glucose 255 (H) 65 - 140 mg/dl   Fingerstick Glucose (POCT)    Collection Time: 12/12/24  4:36 PM   Result Value Ref Range    POC Glucose 53 (L) 65 - 140 mg/dl   Fingerstick Glucose (POCT)    Collection Time: 12/12/24  5:11 PM   Result Value Ref Range    POC Glucose 73 65 - 140 mg/dl   Fingerstick Glucose (POCT)    Collection Time: 12/12/24  9:32 PM   Result Value Ref Range    POC Glucose 175 (H) 65 - 140 mg/dl   Phosphorus    Collection Time: 12/13/24  5:36 AM   Result Value Ref Range    Phosphorus 2.9 2.3 - 4.1 mg/dL   Magnesium    Collection Time: 12/13/24  5:36 AM   Result Value Ref Range    Magnesium 2.1 1.9 - 2.7 mg/dL   Basic metabolic panel    Collection Time: 12/13/24  5:36 AM   Result Value Ref Range    Sodium 133 (L) 135 - 147 mmol/L     Potassium 4.3 3.5 - 5.3 mmol/L    Chloride 95 (L) 96 - 108 mmol/L    CO2 32 21 - 32 mmol/L    ANION GAP 6 4 - 13 mmol/L    BUN 60 (H) 5 - 25 mg/dL    Creatinine 2.17 (H) 0.60 - 1.30 mg/dL    Glucose 53 (L) 65 - 140 mg/dL    Calcium 8.0 (L) 8.4 - 10.2 mg/dL    eGFR 26 ml/min/1.73sq m   CBC and differential    Collection Time: 12/13/24  5:36 AM   Result Value Ref Range    WBC 4.39 4.31 - 10.16 Thousand/uL    RBC 3.02 (L) 3.88 - 5.62 Million/uL    Hemoglobin 9.6 (L) 12.0 - 17.0 g/dL    Hematocrit 29.8 (L) 36.5 - 49.3 %    MCV 99 (H) 82 - 98 fL    MCH 31.8 26.8 - 34.3 pg    MCHC 32.2 31.4 - 37.4 g/dL    RDW 16.7 (H) 11.6 - 15.1 %    MPV 9.5 8.9 - 12.7 fL    Platelets 72 (L) 149 - 390 Thousands/uL    nRBC 0 /100 WBCs    Segmented % 59 43 - 75 %    Immature Grans % 0 0 - 2 %    Lymphocytes % 24 14 - 44 %    Monocytes % 15 (H) 4 - 12 %    Eosinophils Relative 2 0 - 6 %    Basophils Relative 0 0 - 1 %    Absolute Neutrophils 2.59 1.85 - 7.62 Thousands/µL    Absolute Immature Grans 0.01 0.00 - 0.20 Thousand/uL    Absolute Lymphocytes 1.07 0.60 - 4.47 Thousands/µL    Absolute Monocytes 0.64 0.17 - 1.22 Thousand/µL    Eosinophils Absolute 0.07 0.00 - 0.61 Thousand/µL    Basophils Absolute 0.01 0.00 - 0.10 Thousands/µL   Fingerstick Glucose (POCT)    Collection Time: 12/13/24  8:00 AM   Result Value Ref Range    POC Glucose 50 (L) 65 - 140 mg/dl   Fingerstick Glucose (POCT)    Collection Time: 12/13/24  8:32 AM   Result Value Ref Range    POC Glucose 101 65 - 140 mg/dl   Urinalysis with microscopic    Collection Time: 12/13/24  9:43 AM   Result Value Ref Range    Color, UA Light Brown     Clarity, UA Turbid     Specific Gravity, UA 1.020 1.005 - 1.030    pH, UA 8.0 4.5, 5.0, 5.5, 6.0, 6.5, 7.0, 7.5, 8.0    Leukocytes, UA Large (A) Negative    Nitrite, UA Positive (A) Negative    Protein,  (2+) (A) Negative mg/dl    Glucose, UA Negative Negative mg/dl    Ketones, UA Negative Negative mg/dl    Urobilinogen, UA <2.0 <2.0  mg/dl mg/dl    Bilirubin, UA Negative Negative    Occult Blood, UA Moderate (A) Negative    RBC, UA 4-10 (A) None Seen, 2-4 /hpf    WBC, UA 30-50 (A) None Seen, 2-4, 5-60 /hpf    Epithelial Cells Occasional None Seen, Occasional /hpf    Bacteria, UA Moderate (A) None Seen, Occasional /hpf    Amorphous Crystals, UA Innumerable    Fingerstick Glucose (POCT)    Collection Time: 12/13/24 11:26 AM   Result Value Ref Range    POC Glucose 209 (H) 65 - 140 mg/dl   Fingerstick Glucose (POCT)    Collection Time: 12/13/24  4:36 PM   Result Value Ref Range    POC Glucose 198 (H) 65 - 140 mg/dl   Fingerstick Glucose (POCT)    Collection Time: 12/13/24  9:03 PM   Result Value Ref Range    POC Glucose 304 (H) 65 - 140 mg/dl   CBC    Collection Time: 12/14/24  5:57 AM   Result Value Ref Range    WBC 5.03 4.31 - 10.16 Thousand/uL    RBC 3.30 (L) 3.88 - 5.62 Million/uL    Hemoglobin 10.7 (L) 12.0 - 17.0 g/dL    Hematocrit 32.7 (L) 36.5 - 49.3 %    MCV 99 (H) 82 - 98 fL    MCH 32.4 26.8 - 34.3 pg    MCHC 32.7 31.4 - 37.4 g/dL    RDW 16.4 (H) 11.6 - 15.1 %    Platelets 77 (L) 149 - 390 Thousands/uL    MPV 10.2 8.9 - 12.7 fL   Phosphorus    Collection Time: 12/14/24  5:57 AM   Result Value Ref Range    Phosphorus 3.1 2.3 - 4.1 mg/dL   Magnesium    Collection Time: 12/14/24  5:57 AM   Result Value Ref Range    Magnesium 2.1 1.9 - 2.7 mg/dL   Basic metabolic panel    Collection Time: 12/14/24  5:57 AM   Result Value Ref Range    Sodium 134 (L) 135 - 147 mmol/L    Potassium 4.2 3.5 - 5.3 mmol/L    Chloride 96 96 - 108 mmol/L    CO2 33 (H) 21 - 32 mmol/L    ANION GAP 5 4 - 13 mmol/L    BUN 57 (H) 5 - 25 mg/dL    Creatinine 2.11 (H) 0.60 - 1.30 mg/dL    Glucose 121 65 - 140 mg/dL    Calcium 8.1 (L) 8.4 - 10.2 mg/dL    eGFR 27 ml/min/1.73sq m   Fingerstick Glucose (POCT)    Collection Time: 12/14/24  7:26 AM   Result Value Ref Range    POC Glucose 119 65 - 140 mg/dl   Fingerstick Glucose (POCT)    Collection Time: 12/14/24 10:50 AM   Result  Value Ref Range    POC Glucose 224 (H) 65 - 140 mg/dl   Fingerstick Glucose (POCT)    Collection Time: 12/14/24  3:52 PM   Result Value Ref Range    POC Glucose 229 (H) 65 - 140 mg/dl   Fingerstick Glucose (POCT)    Collection Time: 12/14/24  8:02 PM   Result Value Ref Range    POC Glucose 255 (H) 65 - 140 mg/dl   CBC and differential    Collection Time: 12/15/24  6:01 AM   Result Value Ref Range    WBC 5.30 4.31 - 10.16 Thousand/uL    RBC 3.17 (L) 3.88 - 5.62 Million/uL    Hemoglobin 10.4 (L) 12.0 - 17.0 g/dL    Hematocrit 31.5 (L) 36.5 - 49.3 %    MCV 99 (H) 82 - 98 fL    MCH 32.8 26.8 - 34.3 pg    MCHC 33.0 31.4 - 37.4 g/dL    RDW 16.6 (H) 11.6 - 15.1 %    MPV 10.3 8.9 - 12.7 fL    Platelets 79 (L) 149 - 390 Thousands/uL    nRBC 0 /100 WBCs    Segmented % 69 43 - 75 %    Immature Grans % 0 0 - 2 %    Lymphocytes % 19 14 - 44 %    Monocytes % 11 4 - 12 %    Eosinophils Relative 1 0 - 6 %    Basophils Relative 0 0 - 1 %    Absolute Neutrophils 3.60 1.85 - 7.62 Thousands/µL    Absolute Immature Grans 0.02 0.00 - 0.20 Thousand/uL    Absolute Lymphocytes 1.03 0.60 - 4.47 Thousands/µL    Absolute Monocytes 0.58 0.17 - 1.22 Thousand/µL    Eosinophils Absolute 0.06 0.00 - 0.61 Thousand/µL    Basophils Absolute 0.01 0.00 - 0.10 Thousands/µL   Comprehensive metabolic panel    Collection Time: 12/15/24  6:01 AM   Result Value Ref Range    Sodium 135 135 - 147 mmol/L    Potassium 4.4 3.5 - 5.3 mmol/L    Chloride 96 96 - 108 mmol/L    CO2 33 (H) 21 - 32 mmol/L    ANION GAP 6 4 - 13 mmol/L    BUN 62 (H) 5 - 25 mg/dL    Creatinine 2.21 (H) 0.60 - 1.30 mg/dL    Glucose 137 65 - 140 mg/dL    Calcium 7.9 (L) 8.4 - 10.2 mg/dL    Corrected Calcium 8.8 8.3 - 10.1 mg/dL    AST 32 13 - 39 U/L    ALT 38 7 - 52 U/L    Alkaline Phosphatase 132 (H) 34 - 104 U/L    Total Protein 5.3 (L) 6.4 - 8.4 g/dL    Albumin 2.9 (L) 3.5 - 5.0 g/dL    Total Bilirubin 0.82 0.20 - 1.00 mg/dL    eGFR 26 ml/min/1.73sq m   Fingerstick Glucose (POCT)     "Collection Time: 12/15/24  7:39 AM   Result Value Ref Range    POC Glucose 146 (H) 65 - 140 mg/dl   Fingerstick Glucose (POCT)    Collection Time: 12/15/24 11:33 AM   Result Value Ref Range    POC Glucose 253 (H) 65 - 140 mg/dl             Dr. Gina Ross MD, [facility]      \"This note has been constructed using a voice recognition system.Therefore there may be syntax, spelling, and/or grammatical errors. Please call if you have any questions. \"  "

## 2024-12-16 NOTE — PROGRESS NOTES
Outpatient Care Management Note:  OPCM referral identified via HF  discharge report.  Chart review completed.  Patient was admitted to [facility] on 12/07/24 for acute on chronic combined systolic and diastolic HF.  Patient was diuresed with IV diuretics. Patient was cleared to discharge to home on 12/15/24 with ANIBAL services from [facility] and with recommendation to completed blood work within 1 week and follow up with PCP, Cardiology and Urology.      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 Spoke with patient's son González .  CM introduced self .  Son informs me patient / family is currently at an eye doctor's appointment with the patient.  Son requests that I call back tomorrow after 3PM.    Future outreach reminder has been sent.   closure date reminder also set.     Hospital Risk Assessment completed.

## 2024-12-16 NOTE — ASSESSMENT & PLAN NOTE
Lab Results   Component Value Date    EGFR 26 12/15/2024    EGFR 27 12/14/2024    EGFR 26 12/13/2024    CREATININE 2.21 (H) 12/15/2024    CREATININE 2.11 (H) 12/14/2024    CREATININE 2.17 (H) 12/13/2024

## 2024-12-16 NOTE — ASSESSMENT & PLAN NOTE
Denies any increase in dyspnea or significant weight change  not on ACE/ARB/Arni or Aldactone secondary to stage IV kidney disease  not on SGL T2 inhibitor secondary to kidney disease  not on beta blocker secondary to bradycardia  Follows with nephrology for managing his diuretics  low sodium diet

## 2024-12-16 NOTE — TELEPHONE ENCOUNTER
"Talked with son.  Made him aware to have labs done one week post discharge.  BMP order faxed to \"Dr. Baltazar (pt gets lab draw at his office).    ----- Message from Heidi Sweeney MD sent at 12/15/2024 10:13 PM EST -----  Hello    MA team-please help the patient schedule a BMP for Dr. Guadarrama to complete in 1 week.  Appointment in 2 weeks    Patient was discharged from [facility] today.  Initially had presented with concern for volume overload/cardiorenal.  Was diuresed.  Transition to torsemide 40 mg twice daily instead of 60 mg once daily.  Creatinine remains slightly below recent baseline    Had traumatic Dupont insertion.  Was sent home with a Dupont and urology will follow    Thank you  "

## 2024-12-16 NOTE — ASSESSMENT & PLAN NOTE
heart rates currently controlled  not on any AV donnie blocking medication due to periodic bradycardia  Continue Eliquis 2.5 mg bid  Chronic anemia

## 2024-12-17 ENCOUNTER — PATIENT OUTREACH (OUTPATIENT)
Dept: CASE MANAGEMENT | Facility: OTHER | Age: 84
End: 2024-12-17

## 2024-12-17 NOTE — PROGRESS NOTES
Outpatient Care Management Note:  Chart review completed.    FUTURE APPOINTMENTS  12/18/24 with Cardio  12/19/24 with Audiology  12/30/24 with nephrology    Patient needs an appointment with urology.    Second telephone outreach attempt made.  Spoke with patient's son. CM introduced self with explanation of purpose of phone call. Explained that support would be telephonic to provide information about CHF, treatment plan,  medications and identify any SDOH needs.  All questions answered to patient's satisfaction.    Is this a good time for you to talk?   yes    We want to make sure you continue to improve now that you are home. Do you have your discharge instructions/AVS?   yes  Do you have your medication list?     yes  Reviewed medication list with patient?     No.  Son is not at home at time of this phone call.  Do you have your medications?     Per son, he's on his way to  famotidine and docusate at pharmacy.  Patient has all other meds at home  Are you able to afford your medications?     yes  Are you taking your medications as ordered?     yes.  Son assists with this  Do you have any questions about your medications?     no  Does patient have a working scale at home?  Yes  Does patient weigh self daily? yes  If yes, what was your weight today? Son unable to report  Are you experiencing any new or worsening symptoms? no      Aware of cardiology follow up appointment: yes  Aware of PCP follow up appointment: yes  Reminded son patient needs an appointment with urology.  Offered to provide practice phone number.  Son declined need stating phone number is AVS.   Do you need assistance scheduling follow-up appointments?   no  Do you have transportation for appointments & to  medications?    yes  Insurance/financial concerns: None disclosed by son  Have you been contacted by, or visited by, Home Care Services?     yes  Home health agency is [facility].     Self-management/education and teach  Outpatient Medications Marked as Taking for the 4/22/20 encounter (Refill) with Shaneka Munoz NP   Medication Sig Dispense Refill   • sertraline (ZOLOFT) 100 MG tablet Take 1 tablet by mouth daily. 90 tablet 2        Ok to leave detailed Message: Yes  Informed caller of refill policy- 24-48 hours: Yes  No call back needed unless nurse has questions.     Pharmacy: [facility] in [place]   back:  Primary learner: son  Following low sodium diet: Son aware patient is to followed diabetic and cardiac prudent diets.  Son identifies barriers as:  - Difficulty with meal planning  - Patient / family dine out often  Following fluid restriction: did not discuss  Monitoring symptoms: Reviewed what to look for.   Knows when to call provider: Reinforced to call Cardiology for weight gain greater then 3 lbs in 1 day or 5 lbs in 1 week, experiencing CP, SOB, fast heartbeat, or dizziness    Per son's request, HF information and CHF Zone tool mailed to patient's home.     May I follow up with you again?    yes  Patient agreeable to follow up in 1 week.  Future outreach reminder has been sent.   closure date reminder also set.

## 2024-12-18 ENCOUNTER — OFFICE VISIT (OUTPATIENT)
Dept: CARDIOLOGY CLINIC | Facility: CLINIC | Age: 84
End: 2024-12-18
Payer: MEDICARE

## 2024-12-18 VITALS
WEIGHT: 137.02 LBS | BODY MASS INDEX: 20.77 KG/M2 | SYSTOLIC BLOOD PRESSURE: 110 MMHG | DIASTOLIC BLOOD PRESSURE: 80 MMHG | HEIGHT: 68 IN | OXYGEN SATURATION: 99 % | HEART RATE: 83 BPM

## 2024-12-18 DIAGNOSIS — E78.5 DYSLIPIDEMIA: ICD-10-CM

## 2024-12-18 DIAGNOSIS — M89.9 CHRONIC KIDNEY DISEASE-MINERAL AND BONE DISORDER: ICD-10-CM

## 2024-12-18 DIAGNOSIS — I10 ESSENTIAL HYPERTENSION: ICD-10-CM

## 2024-12-18 DIAGNOSIS — N18.9 CHRONIC KIDNEY DISEASE-MINERAL AND BONE DISORDER: ICD-10-CM

## 2024-12-18 DIAGNOSIS — I50.42 CHRONIC COMBINED SYSTOLIC AND DIASTOLIC CHF (CONGESTIVE HEART FAILURE) (HCC): Primary | ICD-10-CM

## 2024-12-18 DIAGNOSIS — E83.9 CHRONIC KIDNEY DISEASE-MINERAL AND BONE DISORDER: ICD-10-CM

## 2024-12-18 DIAGNOSIS — E11.51 DIABETES MELLITUS WITH PERIPHERAL ARTERY DISEASE (HCC): ICD-10-CM

## 2024-12-18 DIAGNOSIS — I48.20 CHRONIC ATRIAL FIBRILLATION (HCC): ICD-10-CM

## 2024-12-18 PROCEDURE — 93000 ELECTROCARDIOGRAM COMPLETE: CPT | Performed by: INTERNAL MEDICINE

## 2024-12-18 PROCEDURE — 99214 OFFICE O/P EST MOD 30 MIN: CPT | Performed by: INTERNAL MEDICINE

## 2024-12-19 ENCOUNTER — OFFICE VISIT (OUTPATIENT)
Dept: AUDIOLOGY | Facility: CLINIC | Age: 84
End: 2024-12-19
Payer: MEDICARE

## 2024-12-19 DIAGNOSIS — H90.3 SENSORINEURAL HEARING LOSS (SNHL), BILATERAL: Primary | ICD-10-CM

## 2024-12-19 PROCEDURE — 92557 COMPREHENSIVE HEARING TEST: CPT | Performed by: AUDIOLOGIST

## 2024-12-19 PROCEDURE — 92567 TYMPANOMETRY: CPT | Performed by: AUDIOLOGIST

## 2024-12-19 NOTE — PROGRESS NOTES
Hearing Aid Visit:    Name:  Yao Tipton  :  1940  Age:  84 y.o.  MRN:  [id number]  Date of Evaluation: 24     HISTORY:    Yao Tipton was seen today (2024) for a(n) in-warranty hearing aid check of his bilateral hearing aids in conjunction with a hearing evaluation.  Today, Yao reported no issues with the devices.    Most recent Audiogram: 2024    DEVICE INFORMATION:   Hearing Aid Fitting Date: 3/6/2024       Left Device Right Device   Hearing Aid Make: OtBibulu Oticon   Hearing Aid Model: Real 2 miniRITE R Real 2 miniRITE R   Serial Number: [id number]   Repair Warranty Expiration Date: 10/11/26 10/11/26   Loss/Damage Warranty Expiration Date:  10/11/26 10/11/26    Length: 2 2    Output: 85 no lock 85 no lock    Wax System: Pro Wax miniFIT Pro Wax miniFIT   Dome Size/Style: 8mm double bass 8mm double bass   Battery: Lithium-ion Rechargeable Lithium-ion Rechargeable      Serial Number: [id number]  Accessories: N/A    ACTION/ADJUSTMENTS:  Added new audiogram to [facility] and reprogrammed both to prescription  Changed domes and instructed son on cleaning the mics (with brush) and changing the wax guards monthly    RECOMMENDATIONS:   RTO 25 for HA maintenance and cerumen check, under warranty       Vicente Samayoa, CCC-A  Clinical Audiologist  [id number]  [facility] AUDIOLOGY  [address] [facility] [address]

## 2024-12-20 ENCOUNTER — HOSPITAL ENCOUNTER (EMERGENCY)
Facility: HOSPITAL | Age: 84
Discharge: HOME | End: 2024-12-20
Attending: EMERGENCY MEDICINE
Payer: MEDICARE

## 2024-12-20 VITALS
TEMPERATURE: 97.6 F | DIASTOLIC BLOOD PRESSURE: 65 MMHG | OXYGEN SATURATION: 97 % | HEART RATE: 67 BPM | SYSTOLIC BLOOD PRESSURE: 142 MMHG | RESPIRATION RATE: 18 BRPM

## 2024-12-20 DIAGNOSIS — N39.0 UTI (URINARY TRACT INFECTION): Primary | ICD-10-CM

## 2024-12-20 DIAGNOSIS — T83.9XXA PROBLEM WITH FOLEY CATHETER, INITIAL ENCOUNTER (HCC): ICD-10-CM

## 2024-12-20 LAB
BACTERIA UR QL AUTO: ABNORMAL /HPF
BILIRUB UR QL STRIP: NEGATIVE
CLARITY UR: ABNORMAL
COLOR UR: ABNORMAL
GLUCOSE UR STRIP-MCNC: NEGATIVE MG/DL
HGB UR QL STRIP.AUTO: ABNORMAL
KETONES UR STRIP-MCNC: NEGATIVE MG/DL
LEUKOCYTE ESTERASE UR QL STRIP: ABNORMAL
NITRITE UR QL STRIP: NEGATIVE
NON-SQ EPI CELLS URNS QL MICRO: ABNORMAL /HPF
PH UR STRIP.AUTO: 8 [PH]
PROT UR STRIP-MCNC: ABNORMAL MG/DL
RBC #/AREA URNS AUTO: ABNORMAL /HPF
SP GR UR STRIP.AUTO: 1.02 (ref 1–1.03)
UROBILINOGEN UR STRIP-ACNC: <2 MG/DL
WBC #/AREA URNS AUTO: ABNORMAL /HPF

## 2024-12-20 PROCEDURE — 87077 CULTURE AEROBIC IDENTIFY: CPT | Performed by: EMERGENCY MEDICINE

## 2024-12-20 PROCEDURE — 99283 EMERGENCY DEPT VISIT LOW MDM: CPT

## 2024-12-20 PROCEDURE — 87186 SC STD MICRODIL/AGAR DIL: CPT | Performed by: EMERGENCY MEDICINE

## 2024-12-20 PROCEDURE — 99284 EMERGENCY DEPT VISIT MOD MDM: CPT | Performed by: EMERGENCY MEDICINE

## 2024-12-20 PROCEDURE — 87086 URINE CULTURE/COLONY COUNT: CPT | Performed by: EMERGENCY MEDICINE

## 2024-12-20 PROCEDURE — 81001 URINALYSIS AUTO W/SCOPE: CPT | Performed by: EMERGENCY MEDICINE

## 2024-12-20 RX ORDER — CEPHALEXIN 500 MG/1
500 CAPSULE ORAL ONCE
Status: COMPLETED | OUTPATIENT
Start: 2024-12-20 | End: 2024-12-20

## 2024-12-20 RX ORDER — LIDOCAINE HYDROCHLORIDE 20 MG/ML
1 JELLY TOPICAL ONCE
Status: COMPLETED | OUTPATIENT
Start: 2024-12-20 | End: 2024-12-20

## 2024-12-20 RX ORDER — CEPHALEXIN 500 MG/1
500 CAPSULE ORAL EVERY 12 HOURS SCHEDULED
Qty: 20 CAPSULE | Refills: 0 | Status: SHIPPED | OUTPATIENT
Start: 2024-12-20 | End: 2024-12-30

## 2024-12-20 RX ADMIN — LIDOCAINE HYDROCHLORIDE 1 APPLICATION: 20 JELLY TOPICAL at 08:57

## 2024-12-20 RX ADMIN — CEPHALEXIN 500 MG: 500 CAPSULE ORAL at 09:53

## 2024-12-20 NOTE — ED NOTES
Existing victor catheter removed and pt spontaneously urinated and able obtained urine  sample. Bladder scan 395 ml of urine seen. Provider made aware. Encouraged pt to urinate . Continue to monitor pt.      Tonja Madrigal RN  12/20/24 2605

## 2024-12-20 NOTE — ED PROVIDER NOTES
Time reflects when diagnosis was documented in both MDM as applicable and the Disposition within this note       Time User Action Codes Description Comment    12/20/2024  9:38 AM Renetta Proctor Add [N39.0] UTI (urinary tract infection)     12/20/2024  9:38 AM Renetta Proctor Add [T83.9XXA] Problem with Dupont catheter, initial encounter (HCC)           ED Disposition       ED Disposition   Discharge    Condition   Stable    Date/Time   Fri Dec 20, 2024  9:38 AM    Comment   Yao Tipton discharge to home/self care.                   Assessment & Plan       Medical Decision Making  Pt is a 85yo M who presents with urinary catheter problem.     Will plan to replace urinary catheter.  Will also send urine sample.  See ED course for results and details.    Plan to discharge pt with f/u to urology. Discussed returning the ED with new or worsening of symptoms. Discussed use of over the counter medications as stated on the bottle as needed for pain. Discussed Dupont care. Pt and son expressed understanding of discharge instructions, return precautions, and medication instructions and is stable for discharge at this time. All questions were answered and pt was discharged without incident.       Amount and/or Complexity of Data Reviewed  Labs: ordered. Decision-making details documented in ED Course.    Risk  Prescription drug management.        ED Course as of 12/20/24 1000   Fri Dec 20, 2024   0910 Catheter removed. Pt voided after removal. Bladder scan 395mL. Discussed with pt that if able to urinate further, will not replace catheter. Pt expressed understanding.    0923 Leukocytes, UA(!): Large   0925 Nitrite, UA: Negative   0925 Blood, UA(!): Moderate  Awaiting micro.    0935 WBC, UA(!): 30-50   0935 Bacteria, UA(!): Moderate  Will treat. Culture in process.    0935 Pt unable to void. Will replace catheter. Pt made aware of plan.        Medications   lidocaine (URO-JET) 2 % urethral/mucosal gel 1 Application (1  Application Urethral Given 12/20/24 0857)   cephalexin (KEFLEX) capsule 500 mg (500 mg Oral Given 12/20/24 0953)       ED Risk Strat Scores                                              History of Present Illness       Chief Complaint   Patient presents with    Urinary Catheter Problem     Pt c/o pain from victor catheter, pt brought in by son. State they noticed last night and today victor is not draining properly        Past Medical History:   Diagnosis Date    Atrial fibrillation (HCC)     BPH (benign prostatic hyperplasia)     CHF (congestive heart failure) (HCC)     Chronic kidney disease     Coronary artery disease     Diabetes mellitus (HCC)     Hyperlipidemia     Hypertension     Hyponatremia 12/07/2024      Past Surgical History:   Procedure Laterality Date    CARDIAC CATHETERIZATION N/A 6/30/2023    Procedure: Cardiac pericardiocentesis;  Surgeon: Shanna Adame DO;  Location: [facility];  Service: Cardiology    CARDIAC CATHETERIZATION N/A 6/30/2023    Procedure: Cardiac RHC;  Surgeon: Shanna Adame DO;  Location: [facility];  Service: Cardiology    EYE SURGERY      IR CHEST TUBE PLACEMENT  6/24/2023    IR CHEST TUBE PLACEMENT  7/28/2023    IR PLEURAL EFFUSION LONG-TERM CATHETER PLACEMENT  8/7/2023    IR PLEURAL EFFUSION LONG-TERM CATHETER REMOVAL  1/3/2024    IR THORACENTESIS  12/11/2023    SKIN CANCER EXCISION      TONSILLECTOMY        Family History   Problem Relation Age of Onset    Heart disease Mother     Diabetes Father     Vision loss Father     Cancer Sister     Diabetes Sister       Social History     Tobacco Use    Smoking status: Never    Smokeless tobacco: Former    Tobacco comments:     Smoked a pipe 50 years ago   Vaping Use    Vaping status: Never Used   Substance Use Topics    Alcohol use: Not Currently     Alcohol/week: 0.0 standard drinks of alcohol     Comment: special occasions    Drug use: Not Currently      E-Cigarette/Vaping    E-Cigarette Use Never User        E-Cigarette/Vaping Substances    Nicotine No     THC No     CBD No     Flavoring No     Other No     Unknown No       I have reviewed and agree with the history as documented.     Pt is an 83yo M who presents for urinary catheter problem.  Patient was recently in the hospital and had a catheter placed for urinary retention.  Patient has not yet seen urology.  Patient began complaining of pain yesterday and son states that he called an on-call nurse.  They recommended checking the bag and son noted it to be draining less than usual.  Patient was therefore brought in for further evaluation.  Patient reports suprapubic discomfort as well as penile pain.  Patient has not had any fevers or chills.  Son does note that urine has been foul-smelling as well starting yesterday.          Objective       ED Triage Vitals   Temperature Pulse Blood Pressure Respirations SpO2 Patient Position - Orthostatic VS   12/20/24 0841 12/20/24 0837 12/20/24 0837 12/20/24 0837 12/20/24 0837 12/20/24 0837   97.6 °F (36.4 °C) 70 153/76 20 100 % Lying      Temp Source Heart Rate Source BP Location FiO2 (%) Pain Score    12/20/24 0841 12/20/24 0837 12/20/24 0837 -- 12/20/24 0945    Oral Monitor Left arm  No Pain      Vitals      Date and Time Temp Pulse SpO2 Resp BP Pain Score FACES Pain Rating User   12/20/24 0945 -- 67 97 % 18 142/65 No Pain -- MD   12/20/24 0841 97.6 °F (36.4 °C) -- -- -- -- -- --    12/20/24 0837 -- 70 100 % 20 153/76 -- --             Physical Exam  Vitals reviewed. Exam conducted with a chaperone present.   Constitutional:       General: He is not in acute distress.     Appearance: He is well-developed. He is not toxic-appearing or diaphoretic.   HENT:      Head: Normocephalic and atraumatic.      Right Ear: External ear normal.      Left Ear: External ear normal.      Nose: Nose normal.      Mouth/Throat:      Pharynx: Oropharynx is clear.   Eyes:      Extraocular Movements: Extraocular movements intact.       Pupils: Pupils are equal, round, and reactive to light.   Cardiovascular:      Rate and Rhythm: Normal rate and regular rhythm.      Heart sounds: Normal heart sounds.   Pulmonary:      Effort: Pulmonary effort is normal.      Breath sounds: Normal breath sounds.   Abdominal:      General: Bowel sounds are normal. There is no distension.      Palpations: Abdomen is soft.      Tenderness: There is no abdominal tenderness.   Genitourinary:     Comments: Erythema surrounding the urethral meatus with Dupont in place  Musculoskeletal:         General: Normal range of motion.      Cervical back: Normal range of motion and neck supple.   Skin:     General: Skin is warm and dry.      Capillary Refill: Capillary refill takes less than 2 seconds.      Coloration: Skin is not pale.   Neurological:      General: No focal deficit present.      Mental Status: He is alert and oriented to person, place, and time.   Psychiatric:         Speech: Speech normal.         Behavior: Behavior is cooperative.         Results Reviewed       Procedure Component Value Units Date/Time    Urine Microscopic [[id number]]  (Abnormal) Collected: 12/20/24 0909    Lab Status: Final result Specimen: Urine, Clean Catch Updated: 12/20/24 0933     RBC, UA 4-10 /hpf      WBC, UA 30-50 /hpf      Epithelial Cells Occasional /hpf      Bacteria, UA Moderate /hpf     UA (URINE) with reflex to Scope [[id number]]  (Abnormal) Collected: 12/20/24 0909    Lab Status: Final result Specimen: Urine, Clean Catch Updated: 12/20/24 0922     Color, UA Light Yellow     Clarity, UA Cloudy     Specific Gravity, UA 1.020     pH, UA 8.0     Leukocytes, UA Large     Nitrite, UA Negative     Protein,  (2+) mg/dl      Glucose, UA Negative mg/dl      Ketones, UA Negative mg/dl      Urobilinogen, UA <2.0 mg/dl      Bilirubin, UA Negative     Occult Blood, UA Moderate    Urine culture [[id number]] Collected: 12/20/24 0908    Lab Status: In process Specimen: Urine, Clean Catch  Updated: 24 0913            No orders to display       Procedures    ED Medication and Procedure Management   Prior to Admission Medications   Prescriptions Last Dose Informant Patient Reported? Taking?   ALPRAZolam (XANAX) 0.5 mg tablet  Child Yes No   Si.5 mg daily at bedtime as needed for anxiety or sleep   Blood Pressure Monitor NILTON  Child No No   Sig: by Does not apply route daily   Sure Comfort Pen Needles 32G X 4 MM MISC   Yes No   ZINC OXIDE PO  Child Yes No   Sig: Take by mouth daily   allopurinol (ZYLOPRIM) 100 mg tablet  Child Yes No   Sig: Take 100 mg by mouth 2 (two) times a day   apixaban (Eliquis) 2.5 mg   No No   Sig: Take 1 tablet (2.5 mg total) by mouth 2 (two) times a day   ascorbic acid (VITAMIN C) 500 MG tablet  Child No No   Sig: Take 1 tablet (500 mg total) by mouth daily   atorvastatin (LIPITOR) 40 mg tablet  Child No No   Sig: Take 1 tablet (40 mg total) by mouth daily with dinner   cholecalciferol (VITAMIN D3) 1,000 units tablet  Child No No   Sig: Take 2 tablets (2,000 Units total) by mouth daily Do not start before 2023.   docusate sodium (COLACE) 100 mg capsule  Child No No   Sig: Take 1 capsule (100 mg total) by mouth 2 (two) times a day as needed for constipation   famotidine (PEPCID) 10 mg tablet   No No   Sig: Take 1 tablet (10 mg total) by mouth daily at bedtime   ferrous sulfate 325 (65 Fe) mg tablet  Child Yes No   Sig: Take 325 mg by mouth daily with breakfast   glimepiride (AMARYL) 2 mg tablet  Child No No   Sig: Take 1 tablet (2 mg total) by mouth daily with dinner   halobetasol (ULTRAVATE) 0.05 % cream  Child Yes No   insulin aspart (NovoLOG FlexPen) 100 UNIT/ML injection pen   Yes No   latanoprost (XALATAN) 0.005 % ophthalmic solution  Child Yes No   Sig: Administer 1 drop to both eyes daily at bedtime   tamsulosin (FLOMAX) 0.4 mg  Child Yes No   Sig: Take 0.4 mg by mouth daily with dinner   timolol (TIMOPTIC) 0.5 % ophthalmic solution  Child No No   Sig:  Administer 1 drop to both eyes daily   torsemide (DEMADEX) 20 mg tablet   No No   Sig: Take 2 tablets (40 mg total) by mouth 2 (two) times a day      Facility-Administered Medications: None     Patient's Medications   Discharge Prescriptions    CEPHALEXIN (KEFLEX) 500 MG CAPSULE    Take 1 capsule (500 mg total) by mouth every 12 (twelve) hours for 10 days       Start Date: 12/20/2024End Date: 12/30/2024       Order Dose: 500 mg       Quantity: 20 capsule    Refills: 0     No discharge procedures on file.  ED SEPSIS DOCUMENTATION   Time reflects when diagnosis was documented in both MDM as applicable and the Disposition within this note       Time User Action Codes Description Comment    12/20/2024  9:38 AM Renetta Proctor [N39.0] UTI (urinary tract infection)     12/20/2024  9:38 AM Renetta Proctor [T83.9XXA] Problem with Dupont catheter, initial encounter (HCC)                  Renetta Proctor MD  12/20/24 1000

## 2024-12-20 NOTE — ED NOTES
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     Tonja Madrigal RN  12/20/24 0841

## 2024-12-21 ENCOUNTER — RESULTS FOLLOW-UP (OUTPATIENT)
Dept: EMERGENCY DEPT | Facility: HOSPITAL | Age: 84
End: 2024-12-21

## 2024-12-22 LAB — BACTERIA UR CULT: ABNORMAL

## 2024-12-22 NOTE — PROGRESS NOTES
Diagnostic Hearing Evaluation    Name:  Yao Tipton  :  1940  Age:  84 y.o.   MRN:  [id number]  Date of Evaluation: 24    HISTORY:     Reason for visit:  Annual hearing evaluation     Yao Tipton is being seen today at the request of Dr. Baltazar for an annual evaluation of hearing. The patient's son, González, reports that his father has been hospitalized recently.     EVALUATION:    Otoscopic Evaluation:   Right Ear: Non-occluding cerumen   Left Ear: Non-occluding cerumen    Tympanometry:   Right Ear:    Left Ear:     Speech Audiometry:  Speech Reception (SRT)    Right Ear:  dB HL    Left Ear:  dB HL    Word Recognition Scores (WRS):  Right Ear: good (90 % correct)     Left Ear: good (84 % correct)    Stimuli: SAHIL W22     Pure Tone Audiometry:  Conventional pure tone audiometry from 250 - 8000 Hz  (hand raises) was obtained with good reliability and revealed the following:     Right Ear: Moderate sloping to severe sensorineural hearing loss (SNHL)    Left Ear: Moderate sloping to severe sensorineural hearing loss (SNHL)     *see attached audiogram    IMPRESSIONS:   His responses today were of good reliability / Speech testing was of good reliability compared to notes from 2023 examination     RECOMMENDATIONS:  Annual audiological evaluations for monitoring purposes, pending patient medical status     PATIENT EDUCATION:   The results of today's results and recommendations were reviewed with the patient's son and his father's hearing thresholds were explained at length, compared with last years eval.   Treatment options, including amplification and communication strategies, were discussed as appropriate. The patient voiced understanding of his test results. Questions were addressed and the patient was encouraged to contact our department should concerns arise.    Vicente Samayoa, CCC-A  Clinical Audiologist  [id number]  [facility]  AUDIOLOGY  [address] [facility] [facility] [address]

## 2024-12-23 ENCOUNTER — PATIENT OUTREACH (OUTPATIENT)
Dept: CASE MANAGEMENT | Facility: OTHER | Age: 84
End: 2024-12-23

## 2024-12-23 NOTE — PROGRESS NOTES
"Outpatient Care Management Note:  Inbasket  ADT ER alert received. Followup reminder also received.  Chart review completed.    Patient was evaluated at [facility] ED department on 12/20/24 for complaints of victor catheter problems and pain.  Was diagnosed with UTI.  Victor catheter removed.  Patient failed void trial with post void residual of 395 ml.  Victor catheter replaced.   Patient discharged to home with instructions to initiate antibiotic therapy and follow up with Urology.    Future Appointments  12/30/24 with Nephrology  1/2/25 with Cardiology  No future appointment with Uro found in [facility].  Per son appointment scheduled on 12/31/24 10:45 AM with Dr. Erazo.     Patient had follow up OV with  Cardio provider on 12/18/24.  OV notes and treatment plan reviewed.   Treatment plan is as follows:  - Continue Demadex 40 mg twice daily  - Continue Eliquis  - Continue atorvastatin  - follow up with Nephrology - Nephrology is managing diuretics  - Complete lipid and hepatic function panel - Completed bloodwork 12/23/24  - Not on ACE inhibitor, ARB, ARNI or MRA or SGLT2 inhibitor due to CKD not on beta-blocker due to periodic bradycardia   - Return in 2 weeks    Telephone call completed with patient's son today.    Is this a good time for us to talk? Yes  Son reports patient is taking Keflex and son has noticed an improvement in urine color and appearance.  Son describes urine as no longer cloudy or \"sludgy\".   Son also aware of all other follow up appointments next week.      Self management teaching reinforced with son.     Son reports continuing with daily weights,    adherence to all prescribed medications in accordance with treatment plan, and is following low NA diet and keeping fluid intake to less than 64 ounces a day.    Son informs me that [facility] and PT continue with home visits.  Son has no questions or concerns today.   Son consents to future outreach in 2 weeks.  Message reminder for future outreach has been " sent.

## 2024-12-26 ENCOUNTER — TELEPHONE (OUTPATIENT)
Dept: INPATIENT UNIT | Facility: HOSPITAL | Age: 84
End: 2024-12-26

## 2024-12-30 ENCOUNTER — OFFICE VISIT (OUTPATIENT)
Dept: NEPHROLOGY | Facility: CLINIC | Age: 84
End: 2024-12-30
Payer: MEDICARE

## 2024-12-30 VITALS
BODY MASS INDEX: 23.34 KG/M2 | SYSTOLIC BLOOD PRESSURE: 102 MMHG | WEIGHT: 154 LBS | DIASTOLIC BLOOD PRESSURE: 78 MMHG | HEIGHT: 68 IN

## 2024-12-30 DIAGNOSIS — M89.9 CHRONIC KIDNEY DISEASE-MINERAL AND BONE DISORDER: ICD-10-CM

## 2024-12-30 DIAGNOSIS — N18.4 TYPE 2 DIABETES MELLITUS WITH STAGE 4 CHRONIC KIDNEY DISEASE AND HYPERTENSION (HCC): ICD-10-CM

## 2024-12-30 DIAGNOSIS — E16.2 HYPOGLYCEMIA: ICD-10-CM

## 2024-12-30 DIAGNOSIS — E83.9 CHRONIC KIDNEY DISEASE-MINERAL AND BONE DISORDER: ICD-10-CM

## 2024-12-30 DIAGNOSIS — Z09 HOSPITAL DISCHARGE FOLLOW-UP: ICD-10-CM

## 2024-12-30 DIAGNOSIS — E11.22 TYPE 2 DIABETES MELLITUS WITH STAGE 4 CHRONIC KIDNEY DISEASE AND HYPERTENSION (HCC): ICD-10-CM

## 2024-12-30 DIAGNOSIS — N18.9 CHRONIC KIDNEY DISEASE-MINERAL AND BONE DISORDER: ICD-10-CM

## 2024-12-30 DIAGNOSIS — I50.43 ACUTE ON CHRONIC COMBINED SYSTOLIC AND DIASTOLIC CONGESTIVE HEART FAILURE (HCC): Primary | ICD-10-CM

## 2024-12-30 DIAGNOSIS — I12.9 TYPE 2 DIABETES MELLITUS WITH STAGE 4 CHRONIC KIDNEY DISEASE AND HYPERTENSION (HCC): ICD-10-CM

## 2024-12-30 PROCEDURE — G2211 COMPLEX E/M VISIT ADD ON: HCPCS | Performed by: INTERNAL MEDICINE

## 2024-12-30 PROCEDURE — 99214 OFFICE O/P EST MOD 30 MIN: CPT | Performed by: INTERNAL MEDICINE

## 2024-12-30 NOTE — PROGRESS NOTES
Name: Yao Tipton      : 1940      MRN: [id number]  Encounter Provider: Fredi Guadarrama MD  Encounter Date: 2024   Encounter department: [facility]  :  Assessment & Plan  Acute on chronic combined systolic and diastolic congestive heart failure (HCC)  Wt Readings from Last 3 Encounters:   24 69.9 kg (154 lb)   24 62.1 kg (137 lb 0.3 oz)   12/15/24 62.3 kg (137 lb 5.6 oz)   -- Recently discharged from the hospital  -- Was seen by cardiology on   -- Not on RAAS blockade due to advanced chronic kidney disease.  -- Not on SGLT2 inhibitor due to advanced CKD  -- Not on beta-blocker due to bradycardia  -- Missed 1 torsemide dose yesterday.  -- Weight trending up today  -- Recommending continuing and staying compliant with torsemide 40 mg twice a day  -- Low 2 g sodium diet      Orders:    Albumin / creatinine urine ratio; Future    Comprehensive metabolic panel; Future    CBC and Platelet; Future    PTH, intact; Future    Phosphorus; Future    Chronic kidney disease-mineral and bone disorder  Lab Results   Component Value Date    EGFR 26 12/15/2024    EGFR 27 2024    EGFR 26 2024    CREATININE 2.21 (H) 12/15/2024    CREATININE 2.11 (H) 2024    CREATININE 2.17 (H) 2024   -- Check PTH phosphorus at the next visit  -- Calcium level is stable    Orders:    Albumin / creatinine urine ratio; Future    Comprehensive metabolic panel; Future    CBC and Platelet; Future    PTH, intact; Future    Phosphorus; Future    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 (HCC)    Lab Results   Component Value Date    HGBA1C 7.7 (H) 2024     Chronic kidney disease stage IV  -presumed etiology is most likely diabetic nephropathy, with possible component of hypertensive nephrosclerosis and arterial sclerosis.  May even have a component of renal vascular disease  -baseline creatinine 2.5-3 mg/dL  -Outpatient  nephrologist: Dr. Guadarrama, last seen back in April  -Dialysis modalities in the past have been discussed at length  -Chronic Kidney Disease education/Kidney Smart:  Attended  -no urgent indication for dialysis at this time  -avoid NSAIDs  -No recent renal imaging and no clinical indication to repeat at this time  -Continue good diabetic and blood pressure control  -No urgent indication for renal placement therapy.  No firm decision on renal placement therapy at this time.  -Continue torsemide 40 mg twice a day  -Creatinine stable at 2.4 mg/dL    Hypertension  -- Blood pressure running on the lower side  -- Slightly volume mediated  -- Torsemide 40 mg twice a day  -- Low 2 g sodium diet    Diabetes mellitus type 2  -- Hemoglobin A1c 7.7    Orders:    Albumin / creatinine urine ratio; Future    Comprehensive metabolic panel; Future    CBC and Platelet; Future    PTH, intact; Future    Phosphorus; Future    Hospital discharge follow-up  -- Had presented to the [facility] in mid December for worsening lower extremity edema with open wounds.  He was volume overloaded.  His creatinine was below baseline but likely in the setting of volume overload.  --He was initially diuresed with IV Lasix and then transition to torsemide 40 mg twice a day on discharge.  --Had acute urinary retention and was discharged with a Dupont catheter.  He did have some hematuria likely related to traumatic Dupont insertion.  --Has a follow-up with urology tomorrow  --Blood work from December 24 postdischarge showed creatinine stable at 2.4 mg/dL glucose noted to be 64 low.  Calcium 8.5.  Sodium and potassium are normal.  Orders:    Albumin / creatinine urine ratio; Future    Comprehensive metabolic panel; Future    CBC and Platelet; Future    PTH, intact; Future    Phosphorus; Future    Hypoglycemia  -- Fasting glucose was 64 on December 24 blood work.  --Blood sugar was a little bit low this morning his son reports.  --I asked him  to touch base with his primary care physician and to keep glucose tablets or juice nearby in case he has symptomatic hypoglycemia    Orders:    Albumin / creatinine urine ratio; Future    Comprehensive metabolic panel; Future    CBC and Platelet; Future    PTH, intact; Future    Phosphorus; Future        History of Present Illness   HPI  Yao Tipton is a 84 y.o. male who presents for follow-up of acute on chronic congestive heart failure and volume overload.  He was seen by nephrology including myself when he was hospitalized and was admitted on December 8.  His creatinine was below baseline likely in setting of volume overload and loss of weight.  He had some sodium and discrepancies prior to admission.  He had worsening lower extremity edema with open wounds.  He was diuresed initially with IV Lasix and discharged on torsemide 40 mg twice a day.  He also developed acute urinary retention and was discharged with a Dupont catheter.  Has an appointment with them tomorrow.      History obtained from: patient and son    Review of Systems   Constitutional:  Negative for activity change and fever.   Respiratory:  Negative for cough, chest tightness, shortness of breath and wheezing.    Cardiovascular:  Positive for leg swelling. Negative for chest pain.   Gastrointestinal:  Negative for abdominal pain, diarrhea, nausea and vomiting.   Endocrine: Negative for polyuria.   Genitourinary:  Negative for difficulty urinating, dysuria, flank pain, frequency and urgency.   Skin:  Negative for rash.   Neurological:  Negative for dizziness, syncope, light-headedness and headaches.     Current Outpatient Medications on File Prior to Visit   Medication Sig Dispense Refill    allopurinol (ZYLOPRIM) 100 mg tablet Take 100 mg by mouth 2 (two) times a day      ALPRAZolam (XANAX) 0.5 mg tablet 0.5 mg daily at bedtime as needed for anxiety or sleep  0    apixaban (Eliquis) 2.5 mg Take 1 tablet (2.5 mg total) by mouth 2 (two) times a  "day 60 tablet 11    ascorbic acid (VITAMIN C) 500 MG tablet Take 1 tablet (500 mg total) by mouth daily  0    atorvastatin (LIPITOR) 40 mg tablet Take 1 tablet (40 mg total) by mouth daily with dinner 30 tablet 0    cephalexin (KEFLEX) 500 mg capsule Take 1 capsule (500 mg total) by mouth every 12 (twelve) hours for 10 days 20 capsule 0    cholecalciferol (VITAMIN D3) 1,000 units tablet Take 2 tablets (2,000 Units total) by mouth daily Do not start before July 12, 2023. 60 tablet 0    docusate sodium (COLACE) 100 mg capsule Take 1 capsule (100 mg total) by mouth 2 (two) times a day as needed for constipation  0    famotidine (PEPCID) 10 mg tablet Take 1 tablet (10 mg total) by mouth daily at bedtime 30 tablet 0    ferrous sulfate 325 (65 Fe) mg tablet Take 325 mg by mouth daily with breakfast      glimepiride (AMARYL) 2 mg tablet Take 1 tablet (2 mg total) by mouth daily with dinner 30 tablet 0    insulin aspart (NovoLOG FlexPen) 100 UNIT/ML injection pen       latanoprost (XALATAN) 0.005 % ophthalmic solution Administer 1 drop to both eyes daily at bedtime      tamsulosin (FLOMAX) 0.4 mg Take 0.4 mg by mouth daily with dinner      timolol (TIMOPTIC) 0.5 % ophthalmic solution Administer 1 drop to both eyes daily      torsemide (DEMADEX) 20 mg tablet Take 2 tablets (40 mg total) by mouth 2 (two) times a day 60 tablet 0    ZINC OXIDE PO Take by mouth daily      Blood Pressure Monitor NILTON by Does not apply route daily 1 Device 0    halobetasol (ULTRAVATE) 0.05 % cream       Sure Comfort Pen Needles 32G X 4 MM MISC        No current facility-administered medications on file prior to visit.         Objective   /78 (BP Location: Left arm, Patient Position: Sitting, Cuff Size: Standard)   Ht 5' 8\" (1.727 m)   Wt 69.9 kg (154 lb)   BMI 23.42 kg/m²      Physical Exam  Vitals and nursing note reviewed. Exam conducted with a chaperone present.   Constitutional:       General: He is not in acute distress.     " Appearance: He is well-developed. He is ill-appearing. He is not diaphoretic.   HENT:      Head: Normocephalic and atraumatic.   Eyes:      General: No scleral icterus.     Pupils: Pupils are equal, round, and reactive to light.   Cardiovascular:      Rate and Rhythm: Normal rate and regular rhythm.      Heart sounds: Normal heart sounds. No murmur heard.     No friction rub. No gallop.   Pulmonary:      Effort: Pulmonary effort is normal. No respiratory distress.      Breath sounds: Normal breath sounds. No wheezing or rales.   Chest:      Chest wall: No tenderness.   Abdominal:      General: Bowel sounds are normal. There is no distension.      Palpations: Abdomen is soft.      Tenderness: There is no abdominal tenderness. There is no rebound.   Musculoskeletal:         General: Normal range of motion.      Cervical back: Normal range of motion and neck supple.      Right lower leg: Edema present.      Left lower leg: Edema present.   Skin:     Findings: No rash.   Neurological:      Mental Status: He is alert and oriented to person, place, and time.         Administrative Statements   I have spent a total time of 25 minutes in caring for this patient on the day of the visit/encounter including Risk factor reductions, Impressions, Counseling / Coordination of care, Documenting in the medical record, Reviewing / ordering tests, medicine, procedures  , and Obtaining or reviewing history  .

## 2024-12-30 NOTE — ASSESSMENT & PLAN NOTE
-- Fasting glucose was 64 on December 24 blood work.  --Blood sugar was a little bit low this morning his son reports.  --I asked him to touch base with his primary care physician and to keep glucose tablets or juice nearby in case he has symptomatic hypoglycemia    Orders:    Albumin / creatinine urine ratio; Future    Comprehensive metabolic panel; Future    CBC and Platelet; Future    PTH, intact; Future    Phosphorus; Future

## 2024-12-30 NOTE — ASSESSMENT & PLAN NOTE
Lab Results   Component Value Date    HGBA1C 7.7 (H) 12/07/2024     Chronic kidney disease stage IV  -presumed etiology is most likely diabetic nephropathy, with possible component of hypertensive nephrosclerosis and arterial sclerosis.  May even have a component of renal vascular disease  -baseline creatinine 2.5-3 mg/dL  -Outpatient nephrologist: Dr. Guadarrama, last seen back in April  -Dialysis modalities in the past have been discussed at length  -Chronic Kidney Disease education/Kidney Smart:  Attended  -no urgent indication for dialysis at this time  -avoid NSAIDs  -No recent renal imaging and no clinical indication to repeat at this time  -Continue good diabetic and blood pressure control  -No urgent indication for renal placement therapy.  No firm decision on renal placement therapy at this time.  -Continue torsemide 40 mg twice a day  -Creatinine stable at 2.4 mg/dL    Hypertension  -- Blood pressure running on the lower side  -- Slightly volume mediated  -- Torsemide 40 mg twice a day  -- Low 2 g sodium diet    Diabetes mellitus type 2  -- Hemoglobin A1c 7.7    Orders:    Albumin / creatinine urine ratio; Future    Comprehensive metabolic panel; Future    CBC and Platelet; Future    PTH, intact; Future    Phosphorus; Future

## 2024-12-30 NOTE — ASSESSMENT & PLAN NOTE
-- Had presented to the [facility] in mid December for worsening lower extremity edema with open wounds.  He was volume overloaded.  His creatinine was below baseline but likely in the setting of volume overload.  --He was initially diuresed with IV Lasix and then transition to torsemide 40 mg twice a day on discharge.  --Had acute urinary retention and was discharged with a Dupont catheter.  He did have some hematuria likely related to traumatic Dupont insertion.  --Has a follow-up with urology tomorrow  --Blood work from December 24 postdischarge showed creatinine stable at 2.4 mg/dL glucose noted to be 64 low.  Calcium 8.5.  Sodium and potassium are normal.  Orders:    Albumin / creatinine urine ratio; Future    Comprehensive metabolic panel; Future    CBC and Platelet; Future    PTH, intact; Future    Phosphorus; Future

## 2024-12-30 NOTE — ASSESSMENT & PLAN NOTE
Lab Results   Component Value Date    EGFR 26 12/15/2024    EGFR 27 12/14/2024    EGFR 26 12/13/2024    CREATININE 2.21 (H) 12/15/2024    CREATININE 2.11 (H) 12/14/2024    CREATININE 2.17 (H) 12/13/2024   -- Check PTH phosphorus at the next visit  -- Calcium level is stable    Orders:    Albumin / creatinine urine ratio; Future    Comprehensive metabolic panel; Future    CBC and Platelet; Future    PTH, intact; Future    Phosphorus; Future

## 2024-12-30 NOTE — ASSESSMENT & PLAN NOTE
Wt Readings from Last 3 Encounters:   12/30/24 69.9 kg (154 lb)   12/18/24 62.1 kg (137 lb 0.3 oz)   12/15/24 62.3 kg (137 lb 5.6 oz)   -- Recently discharged from the hospital  -- Was seen by cardiology on December 18  -- Not on RAAS blockade due to advanced chronic kidney disease.  -- Not on SGLT2 inhibitor due to advanced CKD  -- Not on beta-blocker due to bradycardia  -- Missed 1 torsemide dose yesterday.  -- Weight trending up today  -- Recommending continuing and staying compliant with torsemide 40 mg twice a day  -- Low 2 g sodium diet      Orders:    Albumin / creatinine urine ratio; Future    Comprehensive metabolic panel; Future    CBC and Platelet; Future    PTH, intact; Future    Phosphorus; Future

## 2025-01-01 ENCOUNTER — HOSPITAL ENCOUNTER (OUTPATIENT)
Facility: HOSPITAL | Age: 85
End: 2025-03-06
Attending: FAMILY MEDICINE | Admitting: FAMILY MEDICINE

## 2025-01-01 ENCOUNTER — HOSPITAL ENCOUNTER (INPATIENT)
Facility: HOSPITAL | Age: 85
LOS: 8 days | DRG: 871 | End: 2025-03-05
Attending: EMERGENCY MEDICINE | Admitting: ANESTHESIOLOGY
Payer: MEDICARE

## 2025-01-01 ENCOUNTER — APPOINTMENT (INPATIENT)
Dept: NON INVASIVE DIAGNOSTICS | Facility: HOSPITAL | Age: 85
DRG: 871 | End: 2025-01-01
Payer: MEDICARE

## 2025-01-01 ENCOUNTER — APPOINTMENT (EMERGENCY)
Dept: RADIOLOGY | Facility: HOSPITAL | Age: 85
DRG: 871 | End: 2025-01-01
Payer: MEDICARE

## 2025-01-01 VITALS — BODY MASS INDEX: 23.95 KG/M2 | HEIGHT: 68 IN | WEIGHT: 158 LBS

## 2025-01-01 VITALS
SYSTOLIC BLOOD PRESSURE: 85 MMHG | BODY MASS INDEX: 24.09 KG/M2 | DIASTOLIC BLOOD PRESSURE: 50 MMHG | TEMPERATURE: 97.1 F | HEART RATE: 60 BPM | WEIGHT: 158.95 LBS | RESPIRATION RATE: 10 BRPM | OXYGEN SATURATION: 90 % | HEIGHT: 68 IN

## 2025-01-01 DIAGNOSIS — N17.9 ACUTE KIDNEY INJURY SUPERIMPOSED ON CHRONIC KIDNEY DISEASE  (HCC): ICD-10-CM

## 2025-01-01 DIAGNOSIS — J90 PLEURAL EFFUSION: ICD-10-CM

## 2025-01-01 DIAGNOSIS — R57.9 SHOCK (HCC): Primary | ICD-10-CM

## 2025-01-01 DIAGNOSIS — N18.9 ACUTE KIDNEY INJURY SUPERIMPOSED ON CHRONIC KIDNEY DISEASE  (HCC): ICD-10-CM

## 2025-01-01 DIAGNOSIS — E86.0 DEHYDRATION: ICD-10-CM

## 2025-01-01 DIAGNOSIS — I95.9 HYPOTENSION: ICD-10-CM

## 2025-01-01 DIAGNOSIS — N18.4 CHRONIC KIDNEY DISEASE, STAGE 4 (SEVERE) (HCC): ICD-10-CM

## 2025-01-01 LAB
2HR DELTA HS TROPONIN: 0 NG/L
ACANTHOCYTES BLD QL SMEAR: PRESENT
ALBUMIN SERPL BCG-MCNC: 2.3 G/DL (ref 3.5–5)
ALBUMIN SERPL BCG-MCNC: 2.6 G/DL (ref 3.5–5)
ALL TARGETS: NOT DETECTED
ALP SERPL-CCNC: 104 U/L (ref 34–104)
ALP SERPL-CCNC: 130 U/L (ref 34–104)
ALT SERPL W P-5'-P-CCNC: 18 U/L (ref 7–52)
ALT SERPL W P-5'-P-CCNC: 23 U/L (ref 7–52)
AMMONIA PLAS-SCNC: 29 UMOL/L (ref 18–72)
ANION GAP SERPL CALCULATED.3IONS-SCNC: 12 MMOL/L (ref 4–13)
ANION GAP SERPL CALCULATED.3IONS-SCNC: 12 MMOL/L (ref 4–13)
ANION GAP SERPL CALCULATED.3IONS-SCNC: 13 MMOL/L (ref 4–13)
ANION GAP SERPL CALCULATED.3IONS-SCNC: 14 MMOL/L (ref 4–13)
ANION GAP SERPL CALCULATED.3IONS-SCNC: 15 MMOL/L (ref 4–13)
ANISOCYTOSIS BLD QL SMEAR: PRESENT
AORTIC ROOT: 4.1 CM
ARTERIAL PATENCY WRIST A: YES
ARTERIAL PATENCY WRIST A: YES
AST SERPL W P-5'-P-CCNC: 16 U/L (ref 13–39)
AST SERPL W P-5'-P-CCNC: 26 U/L (ref 13–39)
AV LVOT PEAK GRADIENT: 2 MMHG
AV PEAK GRADIENT: 4 MMHG
BACTERIA BLD CULT: ABNORMAL
BACTERIA BLD CULT: NORMAL
BACTERIA UR CULT: ABNORMAL
BACTERIA UR QL AUTO: ABNORMAL /HPF
BASE EX.OXY STD BLDV CALC-SCNC: 68.9 % (ref 60–80)
BASE EX.OXY STD BLDV CALC-SCNC: 76.6 % (ref 60–80)
BASE EX.OXY STD BLDV CALC-SCNC: 76.9 % (ref 60–80)
BASE EX.OXY STD BLDV CALC-SCNC: 88.6 % (ref 60–80)
BASE EXCESS BLDV CALC-SCNC: -1.4 MMOL/L
BASE EXCESS BLDV CALC-SCNC: -4.5 MMOL/L
BASE EXCESS BLDV CALC-SCNC: -5.5 MMOL/L
BASE EXCESS BLDV CALC-SCNC: -5.7 MMOL/L
BASOPHILS # BLD AUTO: 0 THOUSANDS/ÂΜL (ref 0–0.1)
BASOPHILS # BLD AUTO: 0 THOUSANDS/ÂΜL (ref 0–0.1)
BASOPHILS # BLD AUTO: 0.01 THOUSANDS/ÂΜL (ref 0–0.1)
BASOPHILS # BLD AUTO: 0.01 THOUSANDS/ÂΜL (ref 0–0.1)
BASOPHILS # BLD AUTO: 0.02 THOUSANDS/ÂΜL (ref 0–0.1)
BASOPHILS NFR BLD AUTO: 0 % (ref 0–1)
BILIRUB SERPL-MCNC: 0.49 MG/DL (ref 0.2–1)
BILIRUB SERPL-MCNC: 0.66 MG/DL (ref 0.2–1)
BILIRUB UR QL STRIP: NEGATIVE
BLD SMEAR INTERP: NORMAL
BNP SERPL-MCNC: 163 PG/ML (ref 0–100)
BODY TEMPERATURE: 96 DEGREES FEHRENHEIT
BODY TEMPERATURE: 96.9 DEGREES FEHRENHEIT
BODY TEMPERATURE: 97 DEGREES FEHRENHEIT
BSA FOR ECHO PROCEDURE: 1.79 M2
BUN SERPL-MCNC: 101 MG/DL (ref 5–25)
BUN SERPL-MCNC: 101 MG/DL (ref 5–25)
BUN SERPL-MCNC: 102 MG/DL (ref 5–25)
BUN SERPL-MCNC: 103 MG/DL (ref 5–25)
BUN SERPL-MCNC: 103 MG/DL (ref 5–25)
BUN SERPL-MCNC: 104 MG/DL (ref 5–25)
BUN SERPL-MCNC: 106 MG/DL (ref 5–25)
BUN SERPL-MCNC: 111 MG/DL (ref 5–25)
BUN SERPL-MCNC: 111 MG/DL (ref 5–25)
BUN SERPL-MCNC: 112 MG/DL (ref 5–25)
BUN SERPL-MCNC: 120 MG/DL (ref 5–25)
BUN SERPL-MCNC: 123 MG/DL (ref 5–25)
BURR CELLS BLD QL SMEAR: PRESENT
C COLI+JEJUNI TUF STL QL NAA+PROBE: NEGATIVE
C DIFF TOX A+B STL QL IA: NEGATIVE
C DIFF TOX GENS STL QL NAA+PROBE: POSITIVE
CA-I BLD-SCNC: 1.01 MMOL/L (ref 1.12–1.32)
CA-I BLD-SCNC: 1.07 MMOL/L (ref 1.12–1.32)
CALCIUM ALBUM COR SERPL-MCNC: 8.7 MG/DL (ref 8.3–10.1)
CALCIUM ALBUM COR SERPL-MCNC: 8.9 MG/DL (ref 8.3–10.1)
CALCIUM SERPL-MCNC: 7.2 MG/DL (ref 8.4–10.2)
CALCIUM SERPL-MCNC: 7.5 MG/DL (ref 8.4–10.2)
CALCIUM SERPL-MCNC: 7.6 MG/DL (ref 8.4–10.2)
CALCIUM SERPL-MCNC: 7.8 MG/DL (ref 8.4–10.2)
CALCIUM SERPL-MCNC: 7.9 MG/DL (ref 8.4–10.2)
CALCIUM SERPL-MCNC: 8 MG/DL (ref 8.4–10.2)
CALCIUM SERPL-MCNC: 8 MG/DL (ref 8.4–10.2)
CALCIUM SERPL-MCNC: 8.1 MG/DL (ref 8.4–10.2)
CALCIUM SERPL-MCNC: 8.2 MG/DL (ref 8.4–10.2)
CALCIUM SERPL-MCNC: 8.2 MG/DL (ref 8.4–10.2)
CALCIUM SERPL-MCNC: 8.3 MG/DL (ref 8.4–10.2)
CALCIUM SERPL-MCNC: 8.4 MG/DL (ref 8.4–10.2)
CARDIAC TROPONIN I PNL SERPL HS: 17 NG/L (ref ?–50)
CARDIAC TROPONIN I PNL SERPL HS: 17 NG/L (ref ?–50)
CHLORIDE SERPL-SCNC: 102 MMOL/L (ref 96–108)
CHLORIDE SERPL-SCNC: 102 MMOL/L (ref 96–108)
CHLORIDE SERPL-SCNC: 103 MMOL/L (ref 96–108)
CHLORIDE SERPL-SCNC: 104 MMOL/L (ref 96–108)
CHLORIDE SERPL-SCNC: 105 MMOL/L (ref 96–108)
CHLORIDE SERPL-SCNC: 105 MMOL/L (ref 96–108)
CHLORIDE SERPL-SCNC: 106 MMOL/L (ref 96–108)
CHLORIDE SERPL-SCNC: 99 MMOL/L (ref 96–108)
CLARITY UR: CLEAR
CO2 SERPL-SCNC: 18 MMOL/L (ref 21–32)
CO2 SERPL-SCNC: 19 MMOL/L (ref 21–32)
CO2 SERPL-SCNC: 20 MMOL/L (ref 21–32)
CO2 SERPL-SCNC: 22 MMOL/L (ref 21–32)
COLOR UR: YELLOW
CORTIS SERPL-MCNC: 106.6 UG/DL
CREAT SERPL-MCNC: 3.6 MG/DL (ref 0.6–1.3)
CREAT SERPL-MCNC: 3.69 MG/DL (ref 0.6–1.3)
CREAT SERPL-MCNC: 3.76 MG/DL (ref 0.6–1.3)
CREAT SERPL-MCNC: 3.78 MG/DL (ref 0.6–1.3)
CREAT SERPL-MCNC: 3.9 MG/DL (ref 0.6–1.3)
CREAT SERPL-MCNC: 3.93 MG/DL (ref 0.6–1.3)
CREAT SERPL-MCNC: 4.05 MG/DL (ref 0.6–1.3)
CREAT SERPL-MCNC: 4.32 MG/DL (ref 0.6–1.3)
CREAT SERPL-MCNC: 4.32 MG/DL (ref 0.6–1.3)
CREAT SERPL-MCNC: 4.45 MG/DL (ref 0.6–1.3)
CREAT SERPL-MCNC: 4.65 MG/DL (ref 0.6–1.3)
CREAT SERPL-MCNC: 4.92 MG/DL (ref 0.6–1.3)
DOP CALC LVOT AREA: 3.8 CM2
DOP CALC LVOT DIAMETER: 2.2 CM
E WAVE DECELERATION TIME: 153 MS
E/A RATIO: 119
EC STX1+STX2 GENES STL QL NAA+PROBE: NEGATIVE
EOSINOPHIL # BLD AUTO: 0 THOUSAND/ÂΜL (ref 0–0.61)
EOSINOPHIL # BLD AUTO: 0.01 THOUSAND/ÂΜL (ref 0–0.61)
EOSINOPHIL # BLD AUTO: 0.03 THOUSAND/ÂΜL (ref 0–0.61)
EOSINOPHIL NFR BLD AUTO: 0 % (ref 0–6)
EOSINOPHIL NFR BLD AUTO: 1 % (ref 0–6)
ERYTHROCYTE [DISTWIDTH] IN BLOOD BY AUTOMATED COUNT: 17.2 % (ref 11.6–15.1)
ERYTHROCYTE [DISTWIDTH] IN BLOOD BY AUTOMATED COUNT: 17.3 % (ref 11.6–15.1)
ERYTHROCYTE [DISTWIDTH] IN BLOOD BY AUTOMATED COUNT: 17.4 % (ref 11.6–15.1)
ERYTHROCYTE [DISTWIDTH] IN BLOOD BY AUTOMATED COUNT: 17.6 % (ref 11.6–15.1)
ERYTHROCYTE [DISTWIDTH] IN BLOOD BY AUTOMATED COUNT: 17.6 % (ref 11.6–15.1)
ERYTHROCYTE [DISTWIDTH] IN BLOOD BY AUTOMATED COUNT: 17.7 % (ref 11.6–15.1)
ERYTHROCYTE [DISTWIDTH] IN BLOOD BY AUTOMATED COUNT: 17.7 % (ref 11.6–15.1)
FLUAV AG UPPER RESP QL IA.RAPID: NEGATIVE
FLUBV AG UPPER RESP QL IA.RAPID: NEGATIVE
FRACTIONAL SHORTENING: 27 (ref 28–44)
GFR SERPL CREATININE-BSD FRML MDRD: 10 ML/MIN/1.73SQ M
GFR SERPL CREATININE-BSD FRML MDRD: 11 ML/MIN/1.73SQ M
GFR SERPL CREATININE-BSD FRML MDRD: 12 ML/MIN/1.73SQ M
GFR SERPL CREATININE-BSD FRML MDRD: 12 ML/MIN/1.73SQ M
GFR SERPL CREATININE-BSD FRML MDRD: 13 ML/MIN/1.73SQ M
GFR SERPL CREATININE-BSD FRML MDRD: 14 ML/MIN/1.73SQ M
GFR SERPL CREATININE-BSD FRML MDRD: 9 ML/MIN/1.73SQ M
GLUCOSE SERPL-MCNC: 121 MG/DL (ref 65–140)
GLUCOSE SERPL-MCNC: 123 MG/DL (ref 65–140)
GLUCOSE SERPL-MCNC: 141 MG/DL (ref 65–140)
GLUCOSE SERPL-MCNC: 149 MG/DL (ref 65–140)
GLUCOSE SERPL-MCNC: 152 MG/DL (ref 65–140)
GLUCOSE SERPL-MCNC: 152 MG/DL (ref 65–140)
GLUCOSE SERPL-MCNC: 154 MG/DL (ref 65–140)
GLUCOSE SERPL-MCNC: 155 MG/DL (ref 65–140)
GLUCOSE SERPL-MCNC: 156 MG/DL (ref 65–140)
GLUCOSE SERPL-MCNC: 156 MG/DL (ref 65–140)
GLUCOSE SERPL-MCNC: 159 MG/DL (ref 65–140)
GLUCOSE SERPL-MCNC: 161 MG/DL (ref 65–140)
GLUCOSE SERPL-MCNC: 162 MG/DL (ref 65–140)
GLUCOSE SERPL-MCNC: 163 MG/DL (ref 65–140)
GLUCOSE SERPL-MCNC: 164 MG/DL (ref 65–140)
GLUCOSE SERPL-MCNC: 171 MG/DL (ref 65–140)
GLUCOSE SERPL-MCNC: 172 MG/DL (ref 65–140)
GLUCOSE SERPL-MCNC: 175 MG/DL (ref 65–140)
GLUCOSE SERPL-MCNC: 184 MG/DL (ref 65–140)
GLUCOSE SERPL-MCNC: 200 MG/DL (ref 65–140)
GLUCOSE SERPL-MCNC: 213 MG/DL (ref 65–140)
GLUCOSE SERPL-MCNC: 216 MG/DL (ref 65–140)
GLUCOSE SERPL-MCNC: 228 MG/DL (ref 65–140)
GLUCOSE SERPL-MCNC: 236 MG/DL (ref 65–140)
GLUCOSE SERPL-MCNC: 239 MG/DL (ref 65–140)
GLUCOSE SERPL-MCNC: 243 MG/DL (ref 65–140)
GLUCOSE SERPL-MCNC: 258 MG/DL (ref 65–140)
GLUCOSE SERPL-MCNC: 49 MG/DL (ref 65–140)
GLUCOSE SERPL-MCNC: 65 MG/DL (ref 65–140)
GLUCOSE SERPL-MCNC: 68 MG/DL (ref 65–140)
GLUCOSE SERPL-MCNC: 70 MG/DL (ref 65–140)
GLUCOSE SERPL-MCNC: 74 MG/DL (ref 65–140)
GLUCOSE SERPL-MCNC: 81 MG/DL (ref 65–140)
GLUCOSE SERPL-MCNC: 84 MG/DL (ref 65–140)
GLUCOSE UR STRIP-MCNC: NEGATIVE MG/DL
GRAM STN SPEC: ABNORMAL
HAPTOGLOB SERPL-MCNC: 139 MG/DL (ref 38–329)
HCO3 BLDV-SCNC: 20.9 MMOL/L (ref 24–30)
HCO3 BLDV-SCNC: 20.9 MMOL/L (ref 24–30)
HCO3 BLDV-SCNC: 21.8 MMOL/L (ref 24–30)
HCO3 BLDV-SCNC: 23.6 MMOL/L (ref 24–30)
HCT VFR BLD AUTO: 24.1 % (ref 36.5–49.3)
HCT VFR BLD AUTO: 26.1 % (ref 36.5–49.3)
HCT VFR BLD AUTO: 26.7 % (ref 36.5–49.3)
HCT VFR BLD AUTO: 27.2 % (ref 36.5–49.3)
HCT VFR BLD AUTO: 27.3 % (ref 36.5–49.3)
HCT VFR BLD AUTO: 28.9 % (ref 36.5–49.3)
HCT VFR BLD AUTO: 29.5 % (ref 36.5–49.3)
HGB BLD-MCNC: 8 G/DL (ref 12–17)
HGB BLD-MCNC: 8.4 G/DL (ref 12–17)
HGB BLD-MCNC: 8.7 G/DL (ref 12–17)
HGB BLD-MCNC: 8.9 G/DL (ref 12–17)
HGB BLD-MCNC: 9 G/DL (ref 12–17)
HGB BLD-MCNC: 9.2 G/DL (ref 12–17)
HGB BLD-MCNC: 9.7 G/DL (ref 12–17)
HGB UR QL STRIP.AUTO: ABNORMAL
IMM GRANULOCYTES # BLD AUTO: 0.04 THOUSAND/UL (ref 0–0.2)
IMM GRANULOCYTES # BLD AUTO: 0.04 THOUSAND/UL (ref 0–0.2)
IMM GRANULOCYTES # BLD AUTO: 0.05 THOUSAND/UL (ref 0–0.2)
IMM GRANULOCYTES # BLD AUTO: 0.05 THOUSAND/UL (ref 0–0.2)
IMM GRANULOCYTES # BLD AUTO: 0.09 THOUSAND/UL (ref 0–0.2)
IMM GRANULOCYTES NFR BLD AUTO: 1 % (ref 0–2)
INR PPP: 1.47 (ref 0.85–1.19)
INR PPP: 2.01 (ref 0.85–1.19)
INTERVENTRICULAR SEPTUM IN DIASTOLE (PARASTERNAL SHORT AXIS VIEW): 1.1 CM
INTERVENTRICULAR SEPTUM: 1.1 CM (ref 0.6–1.1)
KETONES UR STRIP-MCNC: NEGATIVE MG/DL
LAAS-AP2: 22 CM2
LAAS-AP4: 25.3 CM2
LACTATE SERPL-SCNC: 0.8 MMOL/L (ref 0.5–2)
LDH SERPL-CCNC: 170 U/L (ref 140–271)
LEFT ATRIUM SIZE: 4.4 CM
LEFT ATRIUM VOLUME (MOD BIPLANE): 92 ML
LEFT ATRIUM VOLUME INDEX (MOD BIPLANE): 51.4 ML/M2
LEFT INTERNAL DIMENSION IN SYSTOLE: 3.2 CM (ref 2.1–4)
LEFT VENTRICULAR INTERNAL DIMENSION IN DIASTOLE: 4.4 CM (ref 3.5–6)
LEFT VENTRICULAR POSTERIOR WALL IN END DIASTOLE: 1.1 CM
LEFT VENTRICULAR STROKE VOLUME: 46 ML
LEUKOCYTE ESTERASE UR QL STRIP: ABNORMAL
LV EF US.2D.A4C+ESTIMATED: 53 %
LVSV (TEICH): 46 ML
LYMPHOCYTES # BLD AUTO: 0.38 THOUSANDS/ÂΜL (ref 0.6–4.47)
LYMPHOCYTES # BLD AUTO: 0.51 THOUSANDS/ÂΜL (ref 0.6–4.47)
LYMPHOCYTES # BLD AUTO: 0.54 THOUSANDS/ÂΜL (ref 0.6–4.47)
LYMPHOCYTES # BLD AUTO: 0.61 THOUSANDS/ÂΜL (ref 0.6–4.47)
LYMPHOCYTES # BLD AUTO: 0.93 THOUSANDS/ÂΜL (ref 0.6–4.47)
LYMPHOCYTES NFR BLD AUTO: 11 % (ref 14–44)
LYMPHOCYTES NFR BLD AUTO: 19 % (ref 14–44)
LYMPHOCYTES NFR BLD AUTO: 4 % (ref 14–44)
LYMPHOCYTES NFR BLD AUTO: 8 % (ref 14–44)
LYMPHOCYTES NFR BLD AUTO: 9 % (ref 14–44)
MACROCYTES BLD QL AUTO: PRESENT
MAGNESIUM SERPL-MCNC: 1.9 MG/DL (ref 1.9–2.7)
MAGNESIUM SERPL-MCNC: 2.4 MG/DL (ref 1.9–2.7)
MCH RBC QN AUTO: 30.7 PG (ref 26.8–34.3)
MCH RBC QN AUTO: 31.2 PG (ref 26.8–34.3)
MCH RBC QN AUTO: 31.5 PG (ref 26.8–34.3)
MCH RBC QN AUTO: 31.6 PG (ref 26.8–34.3)
MCH RBC QN AUTO: 31.9 PG (ref 26.8–34.3)
MCH RBC QN AUTO: 32 PG (ref 26.8–34.3)
MCH RBC QN AUTO: 32.4 PG (ref 26.8–34.3)
MCHC RBC AUTO-ENTMCNC: 31.2 G/DL (ref 31.4–37.4)
MCHC RBC AUTO-ENTMCNC: 32 G/DL (ref 31.4–37.4)
MCHC RBC AUTO-ENTMCNC: 32.2 G/DL (ref 31.4–37.4)
MCHC RBC AUTO-ENTMCNC: 33 G/DL (ref 31.4–37.4)
MCHC RBC AUTO-ENTMCNC: 33.2 G/DL (ref 31.4–37.4)
MCHC RBC AUTO-ENTMCNC: 33.3 G/DL (ref 31.4–37.4)
MCHC RBC AUTO-ENTMCNC: 33.6 G/DL (ref 31.4–37.4)
MCV RBC AUTO: 95 FL (ref 82–98)
MCV RBC AUTO: 96 FL (ref 82–98)
MCV RBC AUTO: 96 FL (ref 82–98)
MCV RBC AUTO: 97 FL (ref 82–98)
MCV RBC AUTO: 97 FL (ref 82–98)
MCV RBC AUTO: 98 FL (ref 82–98)
MCV RBC AUTO: 99 FL (ref 82–98)
MONOCYTES # BLD AUTO: 0.1 THOUSAND/ÂΜL (ref 0.17–1.22)
MONOCYTES # BLD AUTO: 0.16 THOUSAND/ÂΜL (ref 0.17–1.22)
MONOCYTES # BLD AUTO: 0.45 THOUSAND/ÂΜL (ref 0.17–1.22)
MONOCYTES # BLD AUTO: 0.46 THOUSAND/ÂΜL (ref 0.17–1.22)
MONOCYTES # BLD AUTO: 0.48 THOUSAND/ÂΜL (ref 0.17–1.22)
MONOCYTES NFR BLD AUTO: 2 % (ref 4–12)
MONOCYTES NFR BLD AUTO: 3 % (ref 4–12)
MONOCYTES NFR BLD AUTO: 4 % (ref 4–12)
MONOCYTES NFR BLD AUTO: 8 % (ref 4–12)
MONOCYTES NFR BLD AUTO: 9 % (ref 4–12)
MRSA NOSE QL CULT: NORMAL
MV E'TISSUE VEL-SEP: 7 CM/S
MV PEAK A VEL: 0.01 M/S
MV PEAK E VEL: 119 CM/S
MV STENOSIS PRESSURE HALF TIME: 44 MS
MV VALVE AREA P 1/2 METHOD: 5
NEUTROPHILS # BLD AUTO: 11.54 THOUSANDS/ÂΜL (ref 1.85–7.62)
NEUTROPHILS # BLD AUTO: 3.52 THOUSANDS/ÂΜL (ref 1.85–7.62)
NEUTROPHILS # BLD AUTO: 4.46 THOUSANDS/ÂΜL (ref 1.85–7.62)
NEUTROPHILS # BLD AUTO: 4.5 THOUSANDS/ÂΜL (ref 1.85–7.62)
NEUTROPHILS # BLD AUTO: 5.3 THOUSANDS/ÂΜL (ref 1.85–7.62)
NEUTS SEG NFR BLD AUTO: 71 % (ref 43–75)
NEUTS SEG NFR BLD AUTO: 79 % (ref 43–75)
NEUTS SEG NFR BLD AUTO: 87 % (ref 43–75)
NEUTS SEG NFR BLD AUTO: 89 % (ref 43–75)
NEUTS SEG NFR BLD AUTO: 91 % (ref 43–75)
NITRITE UR QL STRIP: NEGATIVE
NON VENT ROOM AIR: ABNORMAL %
NON VENT ROOM AIR: ABNORMAL %
NON-SQ EPI CELLS URNS QL MICRO: ABNORMAL /HPF
NRBC BLD AUTO-RTO: 0 /100 WBCS
NRBC BLD AUTO-RTO: 1 /100 WBCS
O2 CT BLDV-SCNC: 10 ML/DL
O2 CT BLDV-SCNC: 10.6 ML/DL
O2 CT BLDV-SCNC: 11.9 ML/DL
O2 CT BLDV-SCNC: 9.4 ML/DL
OVALOCYTES BLD QL SMEAR: PRESENT
PCO2 BLDV: 40.4 MM HG (ref 42–50)
PCO2 BLDV: 45.2 MM HG (ref 42–50)
PCO2 BLDV: 45.2 MM HG (ref 42–50)
PCO2 BLDV: 46.2 MM HG (ref 42–50)
PH BLDV: 7.27 [PH] (ref 7.3–7.4)
PH BLDV: 7.28 [PH] (ref 7.3–7.4)
PH BLDV: 7.3 [PH] (ref 7.3–7.4)
PH BLDV: 7.38 [PH] (ref 7.3–7.4)
PH UR STRIP.AUTO: 6 [PH]
PHOSPHATE SERPL-MCNC: 4.6 MG/DL (ref 2.3–4.1)
PHOSPHATE SERPL-MCNC: 5.4 MG/DL (ref 2.3–4.1)
PHOSPHATE SERPL-MCNC: 5.6 MG/DL (ref 2.3–4.1)
PHOSPHATE SERPL-MCNC: 5.8 MG/DL (ref 2.3–4.1)
PHOSPHATE SERPL-MCNC: 6.6 MG/DL (ref 2.3–4.1)
PLATELET # BLD AUTO: 35 THOUSANDS/UL (ref 149–390)
PLATELET # BLD AUTO: 35 THOUSANDS/UL (ref 149–390)
PLATELET # BLD AUTO: 38 THOUSANDS/UL (ref 149–390)
PLATELET # BLD AUTO: 42 THOUSANDS/UL (ref 149–390)
PLATELET # BLD AUTO: 43 THOUSANDS/UL (ref 149–390)
PLATELET # BLD AUTO: 44 THOUSANDS/UL (ref 149–390)
PLATELET # BLD AUTO: 54 THOUSANDS/UL (ref 149–390)
PLATELET BLD QL SMEAR: ABNORMAL
PMV BLD AUTO: 10.2 FL (ref 8.9–12.7)
PMV BLD AUTO: 10.4 FL (ref 8.9–12.7)
PMV BLD AUTO: 10.8 FL (ref 8.9–12.7)
PMV BLD AUTO: 11.1 FL (ref 8.9–12.7)
PMV BLD AUTO: 12.2 FL (ref 8.9–12.7)
PMV BLD AUTO: 13 FL (ref 8.9–12.7)
PMV BLD AUTO: 13.2 FL (ref 8.9–12.7)
PO2 BLDV: 39.4 MM HG (ref 35–45)
PO2 BLDV: 46.1 MM HG (ref 35–45)
PO2 BLDV: 46.8 MM HG (ref 35–45)
PO2 BLDV: 62.1 MM HG (ref 35–45)
POTASSIUM SERPL-SCNC: 3.9 MMOL/L (ref 3.5–5.3)
POTASSIUM SERPL-SCNC: 4.1 MMOL/L (ref 3.5–5.3)
POTASSIUM SERPL-SCNC: 4.2 MMOL/L (ref 3.5–5.3)
POTASSIUM SERPL-SCNC: 4.3 MMOL/L (ref 3.5–5.3)
POTASSIUM SERPL-SCNC: 4.3 MMOL/L (ref 3.5–5.3)
POTASSIUM SERPL-SCNC: 4.4 MMOL/L (ref 3.5–5.3)
POTASSIUM SERPL-SCNC: 4.4 MMOL/L (ref 3.5–5.3)
POTASSIUM SERPL-SCNC: 4.6 MMOL/L (ref 3.5–5.3)
POTASSIUM SERPL-SCNC: 4.6 MMOL/L (ref 3.5–5.3)
POTASSIUM SERPL-SCNC: 4.7 MMOL/L (ref 3.5–5.3)
POTASSIUM SERPL-SCNC: 5 MMOL/L (ref 3.5–5.3)
POTASSIUM SERPL-SCNC: 5 MMOL/L (ref 3.5–5.3)
PROCALCITONIN SERPL-MCNC: 0.23 NG/ML
PROCALCITONIN SERPL-MCNC: 0.29 NG/ML
PROCALCITONIN SERPL-MCNC: 0.31 NG/ML
PROCALCITONIN SERPL-MCNC: 0.34 NG/ML
PROT SERPL-MCNC: 4.2 G/DL (ref 6.4–8.4)
PROT SERPL-MCNC: 5 G/DL (ref 6.4–8.4)
PROT UR STRIP-MCNC: ABNORMAL MG/DL
PROTHROMBIN TIME: 18.3 SECONDS (ref 12.3–15)
PROTHROMBIN TIME: 23.1 SECONDS (ref 12.3–15)
RBC # BLD AUTO: 2.51 MILLION/UL (ref 3.88–5.62)
RBC # BLD AUTO: 2.69 MILLION/UL (ref 3.88–5.62)
RBC # BLD AUTO: 2.75 MILLION/UL (ref 3.88–5.62)
RBC # BLD AUTO: 2.76 MILLION/UL (ref 3.88–5.62)
RBC # BLD AUTO: 2.85 MILLION/UL (ref 3.88–5.62)
RBC # BLD AUTO: 3 MILLION/UL (ref 3.88–5.62)
RBC # BLD AUTO: 3.03 MILLION/UL (ref 3.88–5.62)
RBC #/AREA URNS AUTO: ABNORMAL /HPF
RBC MORPH BLD: PRESENT
RIGHT ATRIUM AREA SYSTOLE A4C: 26.4 CM2
RIGHT VENTRICLE ID DIMENSION: 4.2 CM
SALMONELLA SP SPAO STL QL NAA+PROBE: NEGATIVE
SARS-COV+SARS-COV-2 AG RESP QL IA.RAPID: NEGATIVE
SHIGELLA SP+EIEC IPAH STL QL NAA+PROBE: NEGATIVE
SL CV LEFT ATRIUM LENGTH A2C: 5.4 CM
SL CV LV EF: 60
SL CV PED ECHO LEFT VENTRICLE DIASTOLIC VOLUME (MOD BIPLANE) 2D: 87 ML
SL CV PED ECHO LEFT VENTRICLE SYSTOLIC VOLUME (MOD BIPLANE) 2D: 40 ML
SODIUM SERPL-SCNC: 133 MMOL/L (ref 135–147)
SODIUM SERPL-SCNC: 133 MMOL/L (ref 135–147)
SODIUM SERPL-SCNC: 135 MMOL/L (ref 135–147)
SODIUM SERPL-SCNC: 136 MMOL/L (ref 135–147)
SODIUM SERPL-SCNC: 137 MMOL/L (ref 135–147)
SODIUM SERPL-SCNC: 138 MMOL/L (ref 135–147)
SP GR UR STRIP.AUTO: 1.02 (ref 1–1.03)
T4 FREE SERPL-MCNC: 0.83 NG/DL (ref 0.61–1.12)
TR MAX PG: 33 MMHG
TR PEAK VELOCITY: 2.9 M/S
TRICUSPID ANNULAR PLANE SYSTOLIC EXCURSION: 1.3 CM
TRICUSPID VALVE PEAK REGURGITATION VELOCITY: 2.89 M/S
TSH SERPL DL<=0.05 MIU/L-ACNC: 9.57 UIU/ML (ref 0.45–4.5)
UROBILINOGEN UR STRIP-ACNC: <2 MG/DL
VANCOMYCIN SERPL-MCNC: 14.7 UG/ML (ref 10–20)
VANCOMYCIN SERPL-MCNC: 19.3 UG/ML (ref 10–20)
WBC # BLD AUTO: 12.64 THOUSAND/UL (ref 4.31–10.16)
WBC # BLD AUTO: 12.8 THOUSAND/UL (ref 4.31–10.16)
WBC # BLD AUTO: 14.1 THOUSAND/UL (ref 4.31–10.16)
WBC # BLD AUTO: 4.95 THOUSAND/UL (ref 4.31–10.16)
WBC # BLD AUTO: 5.02 THOUSAND/UL (ref 4.31–10.16)
WBC # BLD AUTO: 5.62 THOUSAND/UL (ref 4.31–10.16)
WBC # BLD AUTO: 6.06 THOUSAND/UL (ref 4.31–10.16)
WBC #/AREA URNS AUTO: ABNORMAL /HPF

## 2025-01-01 PROCEDURE — 82948 REAGENT STRIP/BLOOD GLUCOSE: CPT

## 2025-01-01 PROCEDURE — 84100 ASSAY OF PHOSPHORUS: CPT | Performed by: NURSE PRACTITIONER

## 2025-01-01 PROCEDURE — 83735 ASSAY OF MAGNESIUM: CPT

## 2025-01-01 PROCEDURE — 84145 PROCALCITONIN (PCT): CPT | Performed by: NURSE PRACTITIONER

## 2025-01-01 PROCEDURE — 92610 EVALUATE SWALLOWING FUNCTION: CPT

## 2025-01-01 PROCEDURE — 85025 COMPLETE CBC W/AUTO DIFF WBC: CPT

## 2025-01-01 PROCEDURE — 80202 ASSAY OF VANCOMYCIN: CPT

## 2025-01-01 PROCEDURE — 85027 COMPLETE CBC AUTOMATED: CPT | Performed by: NURSE PRACTITIONER

## 2025-01-01 PROCEDURE — 74176 CT ABD & PELVIS W/O CONTRAST: CPT

## 2025-01-01 PROCEDURE — 83735 ASSAY OF MAGNESIUM: CPT | Performed by: NURSE PRACTITIONER

## 2025-01-01 PROCEDURE — 93308 TTE F-UP OR LMTD: CPT

## 2025-01-01 PROCEDURE — 96361 HYDRATE IV INFUSION ADD-ON: CPT

## 2025-01-01 PROCEDURE — 84443 ASSAY THYROID STIM HORMONE: CPT | Performed by: EMERGENCY MEDICINE

## 2025-01-01 PROCEDURE — 99291 CRITICAL CARE FIRST HOUR: CPT | Performed by: INTERNAL MEDICINE

## 2025-01-01 PROCEDURE — 80048 BASIC METABOLIC PNL TOTAL CA: CPT | Performed by: NURSE PRACTITIONER

## 2025-01-01 PROCEDURE — 83880 ASSAY OF NATRIURETIC PEPTIDE: CPT | Performed by: EMERGENCY MEDICINE

## 2025-01-01 PROCEDURE — 85610 PROTHROMBIN TIME: CPT | Performed by: NURSE PRACTITIONER

## 2025-01-01 PROCEDURE — 99238 HOSP IP/OBS DSCHRG MGMT 30/<: CPT | Performed by: NURSE PRACTITIONER

## 2025-01-01 PROCEDURE — 99233 SBSQ HOSP IP/OBS HIGH 50: CPT | Performed by: INTERNAL MEDICINE

## 2025-01-01 PROCEDURE — 99221 1ST HOSP IP/OBS SF/LOW 40: CPT | Performed by: NURSE PRACTITIONER

## 2025-01-01 PROCEDURE — 85025 COMPLETE CBC W/AUTO DIFF WBC: CPT | Performed by: NURSE PRACTITIONER

## 2025-01-01 PROCEDURE — 99291 CRITICAL CARE FIRST HOUR: CPT | Performed by: EMERGENCY MEDICINE

## 2025-01-01 PROCEDURE — 87081 CULTURE SCREEN ONLY: CPT | Performed by: INTERNAL MEDICINE

## 2025-01-01 PROCEDURE — 96375 TX/PRO/DX INJ NEW DRUG ADDON: CPT

## 2025-01-01 PROCEDURE — 93306 TTE W/DOPPLER COMPLETE: CPT | Performed by: INTERNAL MEDICINE

## 2025-01-01 PROCEDURE — 83605 ASSAY OF LACTIC ACID: CPT | Performed by: EMERGENCY MEDICINE

## 2025-01-01 PROCEDURE — 87086 URINE CULTURE/COLONY COUNT: CPT | Performed by: NURSE PRACTITIONER

## 2025-01-01 PROCEDURE — 99232 SBSQ HOSP IP/OBS MODERATE 35: CPT | Performed by: ANESTHESIOLOGY

## 2025-01-01 PROCEDURE — 36415 COLL VENOUS BLD VENIPUNCTURE: CPT | Performed by: EMERGENCY MEDICINE

## 2025-01-01 PROCEDURE — 99238 HOSP IP/OBS DSCHRG MGMT 30/<: CPT | Performed by: FAMILY MEDICINE

## 2025-01-01 PROCEDURE — 82330 ASSAY OF CALCIUM: CPT

## 2025-01-01 PROCEDURE — 87186 SC STD MICRODIL/AGAR DIL: CPT | Performed by: NURSE PRACTITIONER

## 2025-01-01 PROCEDURE — 99232 SBSQ HOSP IP/OBS MODERATE 35: CPT | Performed by: FAMILY MEDICINE

## 2025-01-01 PROCEDURE — 71045 X-RAY EXAM CHEST 1 VIEW: CPT

## 2025-01-01 PROCEDURE — 87154 CUL TYP ID BLD PTHGN 6+ TRGT: CPT | Performed by: EMERGENCY MEDICINE

## 2025-01-01 PROCEDURE — 87505 NFCT AGENT DETECTION GI: CPT | Performed by: NURSE PRACTITIONER

## 2025-01-01 PROCEDURE — 92526 ORAL FUNCTION THERAPY: CPT

## 2025-01-01 PROCEDURE — 87804 INFLUENZA ASSAY W/OPTIC: CPT | Performed by: EMERGENCY MEDICINE

## 2025-01-01 PROCEDURE — 99232 SBSQ HOSP IP/OBS MODERATE 35: CPT | Performed by: NURSE PRACTITIONER

## 2025-01-01 PROCEDURE — 93306 TTE W/DOPPLER COMPLETE: CPT

## 2025-01-01 PROCEDURE — 85025 COMPLETE CBC W/AUTO DIFF WBC: CPT | Performed by: EMERGENCY MEDICINE

## 2025-01-01 PROCEDURE — 99291 CRITICAL CARE FIRST HOUR: CPT | Performed by: NURSE PRACTITIONER

## 2025-01-01 PROCEDURE — 87811 SARS-COV-2 COVID19 W/OPTIC: CPT | Performed by: EMERGENCY MEDICINE

## 2025-01-01 PROCEDURE — 80048 BASIC METABOLIC PNL TOTAL CA: CPT

## 2025-01-01 PROCEDURE — 82533 TOTAL CORTISOL: CPT | Performed by: NURSE PRACTITIONER

## 2025-01-01 PROCEDURE — 87077 CULTURE AEROBIC IDENTIFY: CPT | Performed by: EMERGENCY MEDICINE

## 2025-01-01 PROCEDURE — 99292 CRITICAL CARE ADDL 30 MIN: CPT | Performed by: EMERGENCY MEDICINE

## 2025-01-01 PROCEDURE — 82140 ASSAY OF AMMONIA: CPT | Performed by: NURSE PRACTITIONER

## 2025-01-01 PROCEDURE — 84145 PROCALCITONIN (PCT): CPT | Performed by: EMERGENCY MEDICINE

## 2025-01-01 PROCEDURE — 87077 CULTURE AEROBIC IDENTIFY: CPT | Performed by: NURSE PRACTITIONER

## 2025-01-01 PROCEDURE — 96365 THER/PROPH/DIAG IV INF INIT: CPT

## 2025-01-01 PROCEDURE — 83615 LACTATE (LD) (LDH) ENZYME: CPT | Performed by: NURSE PRACTITIONER

## 2025-01-01 PROCEDURE — 81001 URINALYSIS AUTO W/SCOPE: CPT | Performed by: EMERGENCY MEDICINE

## 2025-01-01 PROCEDURE — 83010 ASSAY OF HAPTOGLOBIN QUANT: CPT | Performed by: NURSE PRACTITIONER

## 2025-01-01 PROCEDURE — 82805 BLOOD GASES W/O2 SATURATION: CPT | Performed by: NURSE PRACTITIONER

## 2025-01-01 PROCEDURE — 84484 ASSAY OF TROPONIN QUANT: CPT | Performed by: EMERGENCY MEDICINE

## 2025-01-01 PROCEDURE — 87040 BLOOD CULTURE FOR BACTERIA: CPT | Performed by: EMERGENCY MEDICINE

## 2025-01-01 PROCEDURE — 99285 EMERGENCY DEPT VISIT HI MDM: CPT

## 2025-01-01 PROCEDURE — 80053 COMPREHEN METABOLIC PANEL: CPT | Performed by: EMERGENCY MEDICINE

## 2025-01-01 PROCEDURE — 3E033XZ INTRODUCTION OF VASOPRESSOR INTO PERIPHERAL VEIN, PERCUTANEOUS APPROACH: ICD-10-PCS | Performed by: FAMILY MEDICINE

## 2025-01-01 PROCEDURE — 80053 COMPREHEN METABOLIC PANEL: CPT

## 2025-01-01 PROCEDURE — 84439 ASSAY OF FREE THYROXINE: CPT | Performed by: EMERGENCY MEDICINE

## 2025-01-01 RX ORDER — FAMOTIDINE 20 MG/1
10 TABLET, FILM COATED ORAL
Status: DISCONTINUED | OUTPATIENT
Start: 2025-01-01 | End: 2025-01-01

## 2025-01-01 RX ORDER — ASCORBIC ACID 500 MG
500 TABLET ORAL DAILY
Status: DISCONTINUED | OUTPATIENT
Start: 2025-01-01 | End: 2025-01-01

## 2025-01-01 RX ORDER — HYDROCORTISONE SODIUM SUCCINATE 100 MG/2ML
50 INJECTION INTRAMUSCULAR; INTRAVENOUS EVERY 6 HOURS SCHEDULED
Status: DISCONTINUED | OUTPATIENT
Start: 2025-01-01 | End: 2025-01-01

## 2025-01-01 RX ORDER — MAGNESIUM SULFATE HEPTAHYDRATE 40 MG/ML
2 INJECTION, SOLUTION INTRAVENOUS ONCE
Status: COMPLETED | OUTPATIENT
Start: 2025-01-01 | End: 2025-01-01

## 2025-01-01 RX ORDER — SODIUM CHLORIDE, SODIUM GLUCONATE, SODIUM ACETATE, POTASSIUM CHLORIDE, MAGNESIUM CHLORIDE, SODIUM PHOSPHATE, DIBASIC, AND POTASSIUM PHOSPHATE .53; .5; .37; .037; .03; .012; .00082 G/100ML; G/100ML; G/100ML; G/100ML; G/100ML; G/100ML; G/100ML
500 INJECTION, SOLUTION INTRAVENOUS ONCE
Status: COMPLETED | OUTPATIENT
Start: 2025-01-01 | End: 2025-01-01

## 2025-01-01 RX ORDER — DEXTROSE, SODIUM CHLORIDE, SODIUM LACTATE, POTASSIUM CHLORIDE, AND CALCIUM CHLORIDE 5; .6; .31; .03; .02 G/100ML; G/100ML; G/100ML; G/100ML; G/100ML
30 INJECTION, SOLUTION INTRAVENOUS CONTINUOUS
Status: DISCONTINUED | OUTPATIENT
Start: 2025-01-01 | End: 2025-01-01

## 2025-01-01 RX ORDER — INSULIN LISPRO 100 [IU]/ML
2-12 INJECTION, SOLUTION INTRAVENOUS; SUBCUTANEOUS
Status: DISCONTINUED | OUTPATIENT
Start: 2025-01-01 | End: 2025-01-01

## 2025-01-01 RX ORDER — LATANOPROST 50 UG/ML
1 SOLUTION/ DROPS OPHTHALMIC
Status: DISCONTINUED | OUTPATIENT
Start: 2025-01-01 | End: 2025-01-01

## 2025-01-01 RX ORDER — DOCUSATE SODIUM 100 MG/1
100 CAPSULE, LIQUID FILLED ORAL 2 TIMES DAILY
Status: DISCONTINUED | OUTPATIENT
Start: 2025-01-01 | End: 2025-01-01

## 2025-01-01 RX ORDER — CEFTRIAXONE 1 G/50ML
1000 INJECTION, SOLUTION INTRAVENOUS EVERY 24 HOURS
Status: DISCONTINUED | OUTPATIENT
Start: 2025-01-01 | End: 2025-01-01

## 2025-01-01 RX ORDER — VANCOMYCIN HYDROCHLORIDE 1 G/200ML
15 INJECTION, SOLUTION INTRAVENOUS DAILY PRN
Status: DISCONTINUED | OUTPATIENT
Start: 2025-01-01 | End: 2025-01-01

## 2025-01-01 RX ORDER — FERROUS SULFATE 325(65) MG
325 TABLET ORAL
Status: DISCONTINUED | OUTPATIENT
Start: 2025-01-01 | End: 2025-01-01

## 2025-01-01 RX ORDER — LORAZEPAM 2 MG/ML
1 INJECTION INTRAMUSCULAR EVERY 6 HOURS
Status: CANCELLED | OUTPATIENT
Start: 2025-01-01

## 2025-01-01 RX ORDER — CHLORHEXIDINE GLUCONATE ORAL RINSE 1.2 MG/ML
15 SOLUTION DENTAL EVERY 12 HOURS SCHEDULED
Status: DISCONTINUED | OUTPATIENT
Start: 2025-01-01 | End: 2025-01-01

## 2025-01-01 RX ORDER — CEFEPIME HYDROCHLORIDE 1 G/50ML
1000 INJECTION, SOLUTION INTRAVENOUS EVERY 12 HOURS
Status: DISCONTINUED | OUTPATIENT
Start: 2025-01-01 | End: 2025-01-01

## 2025-01-01 RX ORDER — HALOPERIDOL 5 MG/ML
0.5 INJECTION INTRAMUSCULAR EVERY 2 HOUR PRN
Status: CANCELLED | OUTPATIENT
Start: 2025-01-01

## 2025-01-01 RX ORDER — INSULIN LISPRO 100 [IU]/ML
1-5 INJECTION, SOLUTION INTRAVENOUS; SUBCUTANEOUS EVERY 6 HOURS SCHEDULED
Status: DISCONTINUED | OUTPATIENT
Start: 2025-01-01 | End: 2025-01-01

## 2025-01-01 RX ORDER — LORAZEPAM 2 MG/ML
1 INJECTION INTRAMUSCULAR EVERY 6 HOURS
Status: DISCONTINUED | OUTPATIENT
Start: 2025-01-01 | End: 2025-01-01 | Stop reason: HOSPADM

## 2025-01-01 RX ORDER — TAMSULOSIN HYDROCHLORIDE 0.4 MG/1
0.4 CAPSULE ORAL
Status: DISCONTINUED | OUTPATIENT
Start: 2025-01-01 | End: 2025-01-01

## 2025-01-01 RX ORDER — HALOPERIDOL 5 MG/ML
0.5 INJECTION INTRAMUSCULAR EVERY 2 HOUR PRN
Status: DISCONTINUED | OUTPATIENT
Start: 2025-01-01 | End: 2025-01-01 | Stop reason: HOSPADM

## 2025-01-01 RX ORDER — ALBUMIN HUMAN 50 G/1000ML
12.5 SOLUTION INTRAVENOUS ONCE
Status: COMPLETED | OUTPATIENT
Start: 2025-01-01 | End: 2025-01-01

## 2025-01-01 RX ORDER — FUROSEMIDE 10 MG/ML
20 INJECTION INTRAMUSCULAR; INTRAVENOUS ONCE
Status: COMPLETED | OUTPATIENT
Start: 2025-01-01 | End: 2025-01-01

## 2025-01-01 RX ORDER — FUROSEMIDE 10 MG/ML
80 INJECTION INTRAMUSCULAR; INTRAVENOUS ONCE
Status: COMPLETED | OUTPATIENT
Start: 2025-01-01 | End: 2025-01-01

## 2025-01-01 RX ORDER — DEXTROSE MONOHYDRATE 25 G/50ML
25 INJECTION, SOLUTION INTRAVENOUS ONCE
Status: COMPLETED | OUTPATIENT
Start: 2025-01-01 | End: 2025-01-01

## 2025-01-01 RX ORDER — CALCIUM GLUCONATE 20 MG/ML
2 INJECTION, SOLUTION INTRAVENOUS ONCE
Status: COMPLETED | OUTPATIENT
Start: 2025-01-01 | End: 2025-01-01

## 2025-01-01 RX ORDER — ALBUMIN HUMAN 50 G/1000ML
12.5 SOLUTION INTRAVENOUS EVERY 6 HOURS PRN
Status: DISCONTINUED | OUTPATIENT
Start: 2025-01-01 | End: 2025-01-01

## 2025-01-01 RX ORDER — GLYCOPYRROLATE 0.2 MG/ML
0.1 INJECTION INTRAMUSCULAR; INTRAVENOUS EVERY 4 HOURS PRN
Status: CANCELLED | OUTPATIENT
Start: 2025-01-01

## 2025-01-01 RX ORDER — GLYCOPYRROLATE 0.2 MG/ML
0.1 INJECTION INTRAMUSCULAR; INTRAVENOUS EVERY 4 HOURS PRN
Status: DISCONTINUED | OUTPATIENT
Start: 2025-01-01 | End: 2025-01-01 | Stop reason: HOSPADM

## 2025-01-01 RX ORDER — CEFEPIME HYDROCHLORIDE 1 G/50ML
1000 INJECTION, SOLUTION INTRAVENOUS EVERY 24 HOURS
Status: DISCONTINUED | OUTPATIENT
Start: 2025-01-01 | End: 2025-01-01

## 2025-01-01 RX ORDER — VANCOMYCIN HYDROCHLORIDE 1 G/200ML
15 INJECTION, SOLUTION INTRAVENOUS EVERY 12 HOURS
Status: DISCONTINUED | OUTPATIENT
Start: 2025-01-01 | End: 2025-01-01 | Stop reason: DRUGHIGH

## 2025-01-01 RX ORDER — CEFEPIME HYDROCHLORIDE 2 G/50ML
2000 INJECTION, SOLUTION INTRAVENOUS ONCE
Status: COMPLETED | OUTPATIENT
Start: 2025-01-01 | End: 2025-01-01

## 2025-01-01 RX ORDER — ALBUMIN (HUMAN) 12.5 G/50ML
25 SOLUTION INTRAVENOUS ONCE
Status: COMPLETED | OUTPATIENT
Start: 2025-01-01 | End: 2025-01-01

## 2025-01-01 RX ORDER — INSULIN GLARGINE 100 [IU]/ML
10 INJECTION, SOLUTION SUBCUTANEOUS
Status: DISCONTINUED | OUTPATIENT
Start: 2025-01-01 | End: 2025-01-01

## 2025-01-01 RX ORDER — MIDODRINE HYDROCHLORIDE 5 MG/1
10 TABLET ORAL
Status: DISCONTINUED | OUTPATIENT
Start: 2025-01-01 | End: 2025-01-01

## 2025-01-01 RX ORDER — HYDROMORPHONE HCL/PF 1 MG/ML
0.3 SYRINGE (ML) INJECTION EVERY 2 HOUR PRN
Refills: 0 | Status: DISCONTINUED | OUTPATIENT
Start: 2025-01-01 | End: 2025-01-01

## 2025-01-01 RX ORDER — MIDODRINE HYDROCHLORIDE 5 MG/1
5 TABLET ORAL
Status: DISCONTINUED | OUTPATIENT
Start: 2025-01-01 | End: 2025-01-01

## 2025-01-01 RX ORDER — TIMOLOL MALEATE 5 MG/ML
1 SOLUTION/ DROPS OPHTHALMIC DAILY
Status: DISCONTINUED | OUTPATIENT
Start: 2025-01-01 | End: 2025-01-01

## 2025-01-01 RX ORDER — DOCUSATE SODIUM 100 MG/1
100 CAPSULE, LIQUID FILLED ORAL 2 TIMES DAILY PRN
Status: DISCONTINUED | OUTPATIENT
Start: 2025-01-01 | End: 2025-01-01

## 2025-01-01 RX ORDER — VANCOMYCIN HYDROCHLORIDE 1 G/200ML
15 INJECTION, SOLUTION INTRAVENOUS ONCE
Status: COMPLETED | OUTPATIENT
Start: 2025-01-01 | End: 2025-01-01

## 2025-01-01 RX ORDER — HYDROCORTISONE SODIUM SUCCINATE 100 MG/2ML
50 INJECTION INTRAMUSCULAR; INTRAVENOUS ONCE
Status: COMPLETED | OUTPATIENT
Start: 2025-01-01 | End: 2025-01-01

## 2025-01-01 RX ORDER — ATORVASTATIN CALCIUM 40 MG/1
40 TABLET, FILM COATED ORAL
Status: DISCONTINUED | OUTPATIENT
Start: 2025-01-01 | End: 2025-01-01

## 2025-01-01 RX ORDER — LORAZEPAM 2 MG/ML
1 INJECTION INTRAMUSCULAR
Status: DISCONTINUED | OUTPATIENT
Start: 2025-01-01 | End: 2025-01-01

## 2025-01-01 RX ORDER — PANTOPRAZOLE SODIUM 40 MG/10ML
40 INJECTION, POWDER, LYOPHILIZED, FOR SOLUTION INTRAVENOUS
Status: DISCONTINUED | OUTPATIENT
Start: 2025-01-01 | End: 2025-01-01

## 2025-01-01 RX ADMIN — HYDROCORTISONE SODIUM SUCCINATE 50 MG: 100 INJECTION, POWDER, FOR SOLUTION INTRAMUSCULAR; INTRAVENOUS at 17:19

## 2025-01-01 RX ADMIN — CEFEPIME HYDROCHLORIDE 1000 MG: 1 INJECTION, SOLUTION INTRAVENOUS at 16:42

## 2025-01-01 RX ADMIN — LORAZEPAM 1 MG: 2 INJECTION INTRAMUSCULAR; INTRAVENOUS at 21:14

## 2025-01-01 RX ADMIN — HYDROCORTISONE SODIUM SUCCINATE 50 MG: 100 INJECTION, POWDER, FOR SOLUTION INTRAMUSCULAR; INTRAVENOUS at 23:33

## 2025-01-01 RX ADMIN — GLYCOPYRROLATE 0.1 MG: 0.2 INJECTION, SOLUTION INTRAMUSCULAR; INTRAVENOUS at 17:06

## 2025-01-01 RX ADMIN — ATORVASTATIN CALCIUM 40 MG: 40 TABLET, FILM COATED ORAL at 16:42

## 2025-01-01 RX ADMIN — INSULIN LISPRO 4 UNITS: 100 INJECTION, SOLUTION INTRAVENOUS; SUBCUTANEOUS at 11:25

## 2025-01-01 RX ADMIN — INSULIN LISPRO 4 UNITS: 100 INJECTION, SOLUTION INTRAVENOUS; SUBCUTANEOUS at 16:26

## 2025-01-01 RX ADMIN — TAMSULOSIN HYDROCHLORIDE 0.4 MG: 0.4 CAPSULE ORAL at 16:19

## 2025-01-01 RX ADMIN — HYDROCORTISONE SODIUM SUCCINATE 50 MG: 100 INJECTION, POWDER, FOR SOLUTION INTRAMUSCULAR; INTRAVENOUS at 00:37

## 2025-01-01 RX ADMIN — SODIUM CHLORIDE, SODIUM GLUCONATE, SODIUM ACETATE, POTASSIUM CHLORIDE, MAGNESIUM CHLORIDE, SODIUM PHOSPHATE, DIBASIC, AND POTASSIUM PHOSPHATE 500 ML: .53; .5; .37; .037; .03; .012; .00082 INJECTION, SOLUTION INTRAVENOUS at 16:15

## 2025-01-01 RX ADMIN — CALCIUM GLUCONATE 2 G: 20 INJECTION, SOLUTION INTRAVENOUS at 09:01

## 2025-01-01 RX ADMIN — HYDROCORTISONE SODIUM SUCCINATE 50 MG: 100 INJECTION, POWDER, FOR SOLUTION INTRAMUSCULAR; INTRAVENOUS at 05:51

## 2025-01-01 RX ADMIN — HYDROCORTISONE SODIUM SUCCINATE 50 MG: 100 INJECTION, POWDER, FOR SOLUTION INTRAMUSCULAR; INTRAVENOUS at 00:47

## 2025-01-01 RX ADMIN — HYDROMORPHONE HYDROCHLORIDE 0.3 MG: 1 INJECTION, SOLUTION INTRAMUSCULAR; INTRAVENOUS; SUBCUTANEOUS at 17:18

## 2025-01-01 RX ADMIN — SODIUM CHLORIDE, SODIUM GLUCONATE, SODIUM ACETATE, POTASSIUM CHLORIDE, MAGNESIUM CHLORIDE, SODIUM PHOSPHATE, DIBASIC, AND POTASSIUM PHOSPHATE 500 ML: .53; .5; .37; .037; .03; .012; .00082 INJECTION, SOLUTION INTRAVENOUS at 11:10

## 2025-01-01 RX ADMIN — TAMSULOSIN HYDROCHLORIDE 0.4 MG: 0.4 CAPSULE ORAL at 20:35

## 2025-01-01 RX ADMIN — TIMOLOL MALEATE 1 DROP: 5 SOLUTION OPHTHALMIC at 08:33

## 2025-01-01 RX ADMIN — MIDODRINE HYDROCHLORIDE 10 MG: 5 TABLET ORAL at 06:11

## 2025-01-01 RX ADMIN — TIMOLOL MALEATE 1 DROP: 5 SOLUTION OPHTHALMIC at 08:00

## 2025-01-01 RX ADMIN — TIMOLOL MALEATE 1 DROP: 5 SOLUTION OPHTHALMIC at 08:17

## 2025-01-01 RX ADMIN — INSULIN LISPRO 2 UNITS: 100 INJECTION, SOLUTION INTRAVENOUS; SUBCUTANEOUS at 06:17

## 2025-01-01 RX ADMIN — MORPHINE SULFATE 2 MG: 2 INJECTION, SOLUTION INTRAMUSCULAR; INTRAVENOUS at 17:42

## 2025-01-01 RX ADMIN — CHLORHEXIDINE GLUCONATE 15 ML: 1.2 RINSE ORAL at 08:33

## 2025-01-01 RX ADMIN — LATANOPROST 1 DROP: 50 SOLUTION OPHTHALMIC at 21:17

## 2025-01-01 RX ADMIN — MIDODRINE HYDROCHLORIDE 5 MG: 5 TABLET ORAL at 09:03

## 2025-01-01 RX ADMIN — CHLORHEXIDINE GLUCONATE 15 ML: 1.2 RINSE ORAL at 20:35

## 2025-01-01 RX ADMIN — LATANOPROST 1 DROP: 50 SOLUTION OPHTHALMIC at 21:47

## 2025-01-01 RX ADMIN — HYDROCORTISONE SODIUM SUCCINATE 50 MG: 100 INJECTION, POWDER, FOR SOLUTION INTRAMUSCULAR; INTRAVENOUS at 11:54

## 2025-01-01 RX ADMIN — SODIUM CHLORIDE 1000 ML: 0.9 INJECTION, SOLUTION INTRAVENOUS at 19:12

## 2025-01-01 RX ADMIN — FUROSEMIDE 20 MG: 10 INJECTION, SOLUTION INTRAMUSCULAR; INTRAVENOUS at 12:01

## 2025-01-01 RX ADMIN — CHLORHEXIDINE GLUCONATE 15 ML: 1.2 RINSE ORAL at 09:03

## 2025-01-01 RX ADMIN — MIDODRINE HYDROCHLORIDE 10 MG: 5 TABLET ORAL at 16:19

## 2025-01-01 RX ADMIN — HYDROCORTISONE SODIUM SUCCINATE 50 MG: 100 INJECTION, POWDER, FOR SOLUTION INTRAMUSCULAR; INTRAVENOUS at 12:01

## 2025-01-01 RX ADMIN — LATANOPROST 1 DROP: 50 SOLUTION OPHTHALMIC at 21:01

## 2025-01-01 RX ADMIN — DOCUSATE SODIUM 100 MG: 100 CAPSULE, LIQUID FILLED ORAL at 17:18

## 2025-01-01 RX ADMIN — CHLORHEXIDINE GLUCONATE 15 ML: 1.2 RINSE ORAL at 08:00

## 2025-01-01 RX ADMIN — MIDODRINE HYDROCHLORIDE 5 MG: 5 TABLET ORAL at 06:23

## 2025-01-01 RX ADMIN — CALCIUM GLUCONATE 2 G: 20 INJECTION, SOLUTION INTRAVENOUS at 07:56

## 2025-01-01 RX ADMIN — INSULIN LISPRO 1 UNITS: 100 INJECTION, SOLUTION INTRAVENOUS; SUBCUTANEOUS at 00:52

## 2025-01-01 RX ADMIN — PANTOPRAZOLE SODIUM 40 MG: 40 INJECTION, POWDER, FOR SOLUTION INTRAVENOUS at 07:51

## 2025-01-01 RX ADMIN — HYDROCORTISONE SODIUM SUCCINATE 50 MG: 100 INJECTION, POWDER, FOR SOLUTION INTRAMUSCULAR; INTRAVENOUS at 12:52

## 2025-01-01 RX ADMIN — HYDROMORPHONE HYDROCHLORIDE 0.3 MG: 1 INJECTION, SOLUTION INTRAMUSCULAR; INTRAVENOUS; SUBCUTANEOUS at 14:10

## 2025-01-01 RX ADMIN — CHLORHEXIDINE GLUCONATE 15 ML: 1.2 RINSE ORAL at 20:44

## 2025-01-01 RX ADMIN — NOREPINEPHRINE BITARTRATE 1 MCG/MIN: 1 SOLUTION INTRAVENOUS at 20:29

## 2025-01-01 RX ADMIN — DOCUSATE SODIUM 100 MG: 100 CAPSULE, LIQUID FILLED ORAL at 12:01

## 2025-01-01 RX ADMIN — MAGNESIUM SULFATE HEPTAHYDRATE 2 G: 40 INJECTION, SOLUTION INTRAVENOUS at 08:10

## 2025-01-01 RX ADMIN — INSULIN LISPRO 2 UNITS: 100 INJECTION, SOLUTION INTRAVENOUS; SUBCUTANEOUS at 06:36

## 2025-01-01 RX ADMIN — MIDODRINE HYDROCHLORIDE 5 MG: 5 TABLET ORAL at 12:24

## 2025-01-01 RX ADMIN — SODIUM CHLORIDE, SODIUM GLUCONATE, SODIUM ACETATE, POTASSIUM CHLORIDE, MAGNESIUM CHLORIDE, SODIUM PHOSPHATE, DIBASIC, AND POTASSIUM PHOSPHATE 500 ML: .53; .5; .37; .037; .03; .012; .00082 INJECTION, SOLUTION INTRAVENOUS at 08:00

## 2025-01-01 RX ADMIN — MIDODRINE HYDROCHLORIDE 5 MG: 5 TABLET ORAL at 11:34

## 2025-01-01 RX ADMIN — ATORVASTATIN CALCIUM 40 MG: 40 TABLET, FILM COATED ORAL at 16:20

## 2025-01-01 RX ADMIN — HYDROCORTISONE SODIUM SUCCINATE 50 MG: 100 INJECTION, POWDER, FOR SOLUTION INTRAMUSCULAR; INTRAVENOUS at 17:18

## 2025-01-01 RX ADMIN — TAMSULOSIN HYDROCHLORIDE 0.4 MG: 0.4 CAPSULE ORAL at 16:42

## 2025-01-01 RX ADMIN — CEFEPIME HYDROCHLORIDE 2000 MG: 2 INJECTION, SOLUTION INTRAVENOUS at 17:36

## 2025-01-01 RX ADMIN — DEXTROSE MONOHYDRATE 25 ML: 25 INJECTION, SOLUTION INTRAVENOUS at 05:34

## 2025-01-01 RX ADMIN — GLYCOPYRROLATE 0.1 MG: 0.2 INJECTION, SOLUTION INTRAMUSCULAR; INTRAVENOUS at 21:52

## 2025-01-01 RX ADMIN — MIDODRINE HYDROCHLORIDE 5 MG: 5 TABLET ORAL at 16:42

## 2025-01-01 RX ADMIN — CHLORHEXIDINE GLUCONATE 15 ML: 1.2 RINSE ORAL at 09:58

## 2025-01-01 RX ADMIN — CALCIUM GLUCONATE 2 G: 20 INJECTION, SOLUTION INTRAVENOUS at 07:08

## 2025-01-01 RX ADMIN — MIDODRINE HYDROCHLORIDE 5 MG: 5 TABLET ORAL at 12:01

## 2025-01-01 RX ADMIN — LATANOPROST 1 DROP: 50 SOLUTION OPHTHALMIC at 21:38

## 2025-01-01 RX ADMIN — NOREPINEPHRINE BITARTRATE 2 MCG/MIN: 1 SOLUTION INTRAVENOUS at 14:00

## 2025-01-01 RX ADMIN — HYDROCORTISONE SODIUM SUCCINATE 50 MG: 100 INJECTION, POWDER, FOR SOLUTION INTRAMUSCULAR; INTRAVENOUS at 05:35

## 2025-01-01 RX ADMIN — CEFEPIME HYDROCHLORIDE 1000 MG: 1 INJECTION, SOLUTION INTRAVENOUS at 16:17

## 2025-01-01 RX ADMIN — INSULIN LISPRO 2 UNITS: 100 INJECTION, SOLUTION INTRAVENOUS; SUBCUTANEOUS at 21:16

## 2025-01-01 RX ADMIN — HYDROCORTISONE SODIUM SUCCINATE 50 MG: 100 INJECTION, POWDER, FOR SOLUTION INTRAMUSCULAR; INTRAVENOUS at 23:26

## 2025-01-01 RX ADMIN — PANTOPRAZOLE SODIUM 40 MG: 40 INJECTION, POWDER, FOR SOLUTION INTRAVENOUS at 05:07

## 2025-01-01 RX ADMIN — FUROSEMIDE 80 MG: 10 INJECTION, SOLUTION INTRAMUSCULAR; INTRAVENOUS at 20:49

## 2025-01-01 RX ADMIN — INSULIN LISPRO 1 UNITS: 100 INJECTION, SOLUTION INTRAVENOUS; SUBCUTANEOUS at 12:27

## 2025-01-01 RX ADMIN — PANTOPRAZOLE SODIUM 40 MG: 40 INJECTION, POWDER, FOR SOLUTION INTRAVENOUS at 05:21

## 2025-01-01 RX ADMIN — TIMOLOL MALEATE 1 DROP: 5 SOLUTION OPHTHALMIC at 08:47

## 2025-01-01 RX ADMIN — INSULIN LISPRO 2 UNITS: 100 INJECTION, SOLUTION INTRAVENOUS; SUBCUTANEOUS at 11:54

## 2025-01-01 RX ADMIN — NOREPINEPHRINE BITARTRATE 2 MCG/MIN: 1 SOLUTION INTRAVENOUS at 21:14

## 2025-01-01 RX ADMIN — TIMOLOL MALEATE 1 DROP: 5 SOLUTION OPHTHALMIC at 10:00

## 2025-01-01 RX ADMIN — TIMOLOL MALEATE 1 DROP: 5 SOLUTION OPHTHALMIC at 09:03

## 2025-01-01 RX ADMIN — HYDROMORPHONE HYDROCHLORIDE 0.3 MG: 1 INJECTION, SOLUTION INTRAMUSCULAR; INTRAVENOUS; SUBCUTANEOUS at 21:53

## 2025-01-01 RX ADMIN — CEFTRIAXONE 1000 MG: 1 INJECTION, SOLUTION INTRAVENOUS at 16:20

## 2025-01-01 RX ADMIN — PANTOPRAZOLE SODIUM 40 MG: 40 INJECTION, POWDER, FOR SOLUTION INTRAVENOUS at 05:51

## 2025-01-01 RX ADMIN — NOREPINEPHRINE BITARTRATE 3 MCG/MIN: 1 SOLUTION INTRAVENOUS at 04:29

## 2025-01-01 RX ADMIN — CHLORHEXIDINE GLUCONATE 15 ML: 1.2 RINSE ORAL at 23:24

## 2025-01-01 RX ADMIN — CHLORHEXIDINE GLUCONATE 15 ML: 1.2 RINSE ORAL at 20:29

## 2025-01-01 RX ADMIN — SODIUM CHLORIDE 500 ML: 0.9 INJECTION, SOLUTION INTRAVENOUS at 14:31

## 2025-01-01 RX ADMIN — LORAZEPAM 1 MG: 2 INJECTION INTRAMUSCULAR; INTRAVENOUS at 14:23

## 2025-01-01 RX ADMIN — INSULIN LISPRO 1 UNITS: 100 INJECTION, SOLUTION INTRAVENOUS; SUBCUTANEOUS at 17:18

## 2025-01-01 RX ADMIN — CHLORHEXIDINE GLUCONATE 15 ML: 1.2 RINSE ORAL at 21:00

## 2025-01-01 RX ADMIN — INSULIN GLARGINE 10 UNITS: 100 INJECTION, SOLUTION SUBCUTANEOUS at 21:47

## 2025-01-01 RX ADMIN — HALOPERIDOL LACTATE 0.5 MG: 5 INJECTION, SOLUTION INTRAMUSCULAR at 17:49

## 2025-01-01 RX ADMIN — LORAZEPAM 1 MG: 2 INJECTION INTRAMUSCULAR; INTRAVENOUS at 09:52

## 2025-01-01 RX ADMIN — MORPHINE SULFATE 2 MG: 2 INJECTION, SOLUTION INTRAMUSCULAR; INTRAVENOUS at 21:15

## 2025-01-01 RX ADMIN — MIDODRINE HYDROCHLORIDE 5 MG: 5 TABLET ORAL at 18:46

## 2025-01-01 RX ADMIN — LATANOPROST 1 DROP: 50 SOLUTION OPHTHALMIC at 22:39

## 2025-01-01 RX ADMIN — HYDROCORTISONE SODIUM SUCCINATE 50 MG: 100 INJECTION, POWDER, FOR SOLUTION INTRAMUSCULAR; INTRAVENOUS at 11:40

## 2025-01-01 RX ADMIN — DEXTROSE MONOHYDRATE 25 ML: 25 INJECTION, SOLUTION INTRAVENOUS at 18:14

## 2025-01-01 RX ADMIN — HYDROCORTISONE SODIUM SUCCINATE 50 MG: 100 INJECTION, POWDER, FOR SOLUTION INTRAMUSCULAR; INTRAVENOUS at 18:46

## 2025-01-01 RX ADMIN — INSULIN LISPRO 2 UNITS: 100 INJECTION, SOLUTION INTRAVENOUS; SUBCUTANEOUS at 21:47

## 2025-01-01 RX ADMIN — ALBUMIN (HUMAN) 12.5 G: 12.5 INJECTION, SOLUTION INTRAVENOUS at 14:46

## 2025-01-01 RX ADMIN — HYDROCORTISONE SODIUM SUCCINATE 50 MG: 100 INJECTION, POWDER, FOR SOLUTION INTRAMUSCULAR; INTRAVENOUS at 05:20

## 2025-01-01 RX ADMIN — HYDROCORTISONE SODIUM SUCCINATE 50 MG: 100 INJECTION, POWDER, FOR SOLUTION INTRAMUSCULAR; INTRAVENOUS at 11:31

## 2025-01-01 RX ADMIN — VANCOMYCIN HYDROCHLORIDE 1000 MG: 1 INJECTION, SOLUTION INTRAVENOUS at 10:11

## 2025-01-01 RX ADMIN — CHLORHEXIDINE GLUCONATE 15 ML: 1.2 RINSE ORAL at 21:47

## 2025-01-01 RX ADMIN — INSULIN LISPRO 2 UNITS: 100 INJECTION, SOLUTION INTRAVENOUS; SUBCUTANEOUS at 13:02

## 2025-01-01 RX ADMIN — INSULIN LISPRO 2 UNITS: 100 INJECTION, SOLUTION INTRAVENOUS; SUBCUTANEOUS at 00:38

## 2025-01-01 RX ADMIN — CEFTRIAXONE 1000 MG: 1 INJECTION, SOLUTION INTRAVENOUS at 16:49

## 2025-01-01 RX ADMIN — HYDROCORTISONE SODIUM SUCCINATE 50 MG: 100 INJECTION, POWDER, FOR SOLUTION INTRAMUSCULAR; INTRAVENOUS at 18:16

## 2025-01-01 RX ADMIN — INSULIN LISPRO 1 UNITS: 100 INJECTION, SOLUTION INTRAVENOUS; SUBCUTANEOUS at 07:51

## 2025-01-01 RX ADMIN — ALBUMIN (HUMAN) 25 G: 0.25 INJECTION, SOLUTION INTRAVENOUS at 12:23

## 2025-01-01 RX ADMIN — CHLORHEXIDINE GLUCONATE 15 ML: 1.2 RINSE ORAL at 08:20

## 2025-01-01 RX ADMIN — CHLORHEXIDINE GLUCONATE 15 ML: 1.2 RINSE ORAL at 08:46

## 2025-01-01 RX ADMIN — HYDROCORTISONE SODIUM SUCCINATE 50 MG: 100 INJECTION, POWDER, FOR SOLUTION INTRAMUSCULAR; INTRAVENOUS at 07:50

## 2025-01-01 RX ADMIN — PANTOPRAZOLE SODIUM 40 MG: 40 INJECTION, POWDER, FOR SOLUTION INTRAVENOUS at 05:35

## 2025-01-01 RX ADMIN — LORAZEPAM 1 MG: 2 INJECTION INTRAMUSCULAR; INTRAVENOUS at 03:06

## 2025-01-01 RX ADMIN — INSULIN LISPRO 2 UNITS: 100 INJECTION, SOLUTION INTRAVENOUS; SUBCUTANEOUS at 06:11

## 2025-01-01 RX ADMIN — CEFEPIME HYDROCHLORIDE 1000 MG: 1 INJECTION, SOLUTION INTRAVENOUS at 16:03

## 2025-01-01 RX ADMIN — SODIUM CHLORIDE 1000 ML: 0.9 INJECTION, SOLUTION INTRAVENOUS at 17:32

## 2025-01-01 RX ADMIN — DEXTROSE, SODIUM CHLORIDE, SODIUM LACTATE, POTASSIUM CHLORIDE, AND CALCIUM CHLORIDE 30 ML/HR: 5; .6; .31; .03; .02 INJECTION, SOLUTION INTRAVENOUS at 22:03

## 2025-01-01 RX ADMIN — VANCOMYCIN HYDROCHLORIDE 1750 MG: 10 INJECTION, POWDER, LYOPHILIZED, FOR SOLUTION INTRAVENOUS at 11:45

## 2025-01-01 RX ADMIN — HYDROCORTISONE SODIUM SUCCINATE 50 MG: 100 INJECTION, POWDER, FOR SOLUTION INTRAMUSCULAR; INTRAVENOUS at 17:38

## 2025-01-01 RX ADMIN — NOREPINEPHRINE BITARTRATE 2 MCG/MIN: 1 SOLUTION INTRAVENOUS at 11:22

## 2025-01-06 ENCOUNTER — OFFICE VISIT (OUTPATIENT)
Dept: CARDIOLOGY CLINIC | Facility: CLINIC | Age: 85
End: 2025-01-06
Payer: MEDICARE

## 2025-01-06 VITALS
BODY MASS INDEX: 23.49 KG/M2 | WEIGHT: 155 LBS | DIASTOLIC BLOOD PRESSURE: 60 MMHG | HEIGHT: 68 IN | SYSTOLIC BLOOD PRESSURE: 90 MMHG | HEART RATE: 98 BPM

## 2025-01-06 DIAGNOSIS — I48.0 PAROXYSMAL ATRIAL FIBRILLATION (HCC): Primary | ICD-10-CM

## 2025-01-06 DIAGNOSIS — I49.3 PVC (PREMATURE VENTRICULAR CONTRACTION): ICD-10-CM

## 2025-01-06 DIAGNOSIS — I10 ESSENTIAL HYPERTENSION: Chronic | ICD-10-CM

## 2025-01-06 DIAGNOSIS — I50.42 CHRONIC COMBINED SYSTOLIC AND DIASTOLIC CHF (CONGESTIVE HEART FAILURE) (HCC): ICD-10-CM

## 2025-01-06 PROCEDURE — 99214 OFFICE O/P EST MOD 30 MIN: CPT | Performed by: INTERNAL MEDICINE

## 2025-01-06 RX ORDER — GLIMEPIRIDE 4 MG/1
4 TABLET ORAL
COMMUNITY
Start: 2025-01-04

## 2025-01-06 NOTE — PROGRESS NOTES
Cardiology Followup    Yao Tipton  [id number]  1940      Interval History: Yao Tipton is a 84 y.o. year old male who is here for followup of CHF and atrial fibrillation.    Last seen by Dr. Ross on December 18, 2024 after a recent hospitalization for congestive heart failure.  There were no significant changes made during that visit.  He is on torsemide 40 mg twice daily.  He follows with nephrology and was seen by them on December 30.  His last echocardiogram was on December 9, 2024 which showed an ejection fraction of 45%.  He has some LE edema but improved from previous.  Still with urinary catheter.     In February 2023, he was admitted to [facility] with acute on chronic renal failure along with hyponatremia and urinary retention.  He did not have any congestive heart failure during that admission.  He was discharged home with torsemide 20 mg alternating with 40 mg and is following with nephrology.  During their last visit, torsemide was increased to 40 mg daily.     Past Medical History:   Diagnosis Date    Atrial fibrillation (HCC)     BPH (benign prostatic hyperplasia)     CHF (congestive heart failure) (HCC)     Chronic kidney disease     Coronary artery disease     Diabetes mellitus (HCC)     Hyperlipidemia     Hypertension     Hyponatremia 12/07/2024     Social History     Socioeconomic History    Marital status:      Spouse name: Not on file    Number of children: 1    Years of education: 12    Highest education level: 12th grade   Occupational History    Not on file   Tobacco Use    Smoking status: Never    Smokeless tobacco: Former    Tobacco comments:     Smoked a pipe 50 years ago   Vaping Use    Vaping status: Never Used   Substance and Sexual Activity    Alcohol use: Not Currently     Alcohol/week: 0.0 standard drinks of alcohol     Comment: special occasions    Drug use: Not Currently    Sexual activity: Not on file   Other Topics Concern    Not on  file   Social History Narrative    Not on file     Social Drivers of Health     Financial Resource Strain: Not on file   Food Insecurity: No Food Insecurity (2024)    Nursing - Inadequate Food Risk Classification     Worried About Running Out of Food in the Last Year: Never true     Ran Out of Food in the Last Year: Never true     Ran Out of Food in the Last Year: Never true   Transportation Needs: No Transportation Needs (2024)    Nursing - Transportation Risk Classification     Lack of Transportation: Not on file     Lack of Transportation: No   Physical Activity: Not on file   Stress: Not on file   Social Connections: Not on file   Intimate Partner Violence: Unknown (2024)    Nursing IPS     Feels Physically and Emotionally Safe: Not on file     Physically Hurt by Someone: Not on file     Humiliated or Emotionally Abused by Someone: Not on file     Physically Hurt by Someone: No     Hurt or Threatened by Someone: No   Housing Stability: Unknown (2024)    Nursing: Inadequate Housing Risk Classification     Has Housing: Not on file     Worried About Losing Housing: Not on file     Unable to Get Utilities: Not on file     Unable to Pay for Housing in the Last Year: No     Has Housin      Family History   Problem Relation Age of Onset    Heart disease Mother     Diabetes Father     Vision loss Father     Cancer Sister     Diabetes Sister      Past Surgical History:   Procedure Laterality Date    CARDIAC CATHETERIZATION N/A 2023    Procedure: Cardiac pericardiocentesis;  Surgeon: Shanna Adame DO;  Location: [facility];  Service: Cardiology    CARDIAC CATHETERIZATION N/A 2023    Procedure: Cardiac RHC;  Surgeon: Shanna Adame DO;  Location: [facility];  Service: Cardiology    EYE SURGERY      IR CHEST TUBE PLACEMENT  2023    IR CHEST TUBE PLACEMENT  2023    IR PLEURAL EFFUSION LONG-TERM CATHETER PLACEMENT  2023    IR PLEURAL EFFUSION LONG-TERM  CATHETER REMOVAL  1/3/2024    IR THORACENTESIS  12/11/2023    SKIN CANCER EXCISION      TONSILLECTOMY         Current Outpatient Medications:     allopurinol (ZYLOPRIM) 100 mg tablet, Take 100 mg by mouth 2 (two) times a day, Disp: , Rfl:     ALPRAZolam (XANAX) 0.5 mg tablet, 0.5 mg daily at bedtime as needed for anxiety or sleep, Disp: , Rfl: 0    apixaban (Eliquis) 2.5 mg, Take 1 tablet (2.5 mg total) by mouth 2 (two) times a day, Disp: 60 tablet, Rfl: 11    ascorbic acid (VITAMIN C) 500 MG tablet, Take 1 tablet (500 mg total) by mouth daily, Disp: , Rfl: 0    atorvastatin (LIPITOR) 40 mg tablet, Take 1 tablet (40 mg total) by mouth daily with dinner, Disp: 30 tablet, Rfl: 0    Blood Pressure Monitor NILTON, by Does not apply route daily, Disp: 1 Device, Rfl: 0    docusate sodium (COLACE) 100 mg capsule, Take 1 capsule (100 mg total) by mouth 2 (two) times a day as needed for constipation, Disp: , Rfl: 0    famotidine (PEPCID) 10 mg tablet, Take 1 tablet (10 mg total) by mouth daily at bedtime, Disp: 30 tablet, Rfl: 0    ferrous sulfate 325 (65 Fe) mg tablet, Take 325 mg by mouth daily with breakfast, Disp: , Rfl:     glimepiride (AMARYL) 4 mg tablet, Take 4 mg by mouth daily with dinner, Disp: , Rfl:     halobetasol (ULTRAVATE) 0.05 % cream, , Disp: , Rfl:     insulin aspart (NovoLOG FlexPen) 100 UNIT/ML injection pen, , Disp: , Rfl:     latanoprost (XALATAN) 0.005 % ophthalmic solution, Administer 1 drop to both eyes daily at bedtime, Disp: , Rfl:     Sure Comfort Pen Needles 32G X 4 MM MISC, , Disp: , Rfl:     tamsulosin (FLOMAX) 0.4 mg, Take 0.4 mg by mouth daily with dinner, Disp: , Rfl:     timolol (TIMOPTIC) 0.5 % ophthalmic solution, Administer 1 drop to both eyes daily, Disp: , Rfl:     torsemide (DEMADEX) 20 mg tablet, Take 2 tablets (40 mg total) by mouth 2 (two) times a day, Disp: 60 tablet, Rfl: 0    ZINC OXIDE PO, Take by mouth daily, Disp: , Rfl:     cholecalciferol (VITAMIN D3) 1,000 units tablet,  "Take 2 tablets (2,000 Units total) by mouth daily Do not start before July 12, 2023., Disp: 60 tablet, Rfl: 0  Allergies   Allergen Reactions    Elemental Sulfur     Sulfa Antibiotics          Review of Systems:  Review of Systems   Respiratory:  Negative for shortness of breath.    Cardiovascular:  Positive for leg swelling. Negative for chest pain and palpitations.   Genitourinary:  Positive for difficulty urinating.   Musculoskeletal:  Positive for arthralgias and myalgias.   All other systems reviewed and are negative.      Physical Exam:  Vitals:    01/06/25 1130   BP: 90/60   BP Location: Left arm   Patient Position: Sitting   Cuff Size: Standard   Pulse: 98   Weight: 70.3 kg (155 lb)   Height: 5' 8\" (1.727 m)     Physical Exam  Constitutional:       General: He is not in acute distress.     Appearance: He is well-developed. He is not diaphoretic.   HENT:      Head: Normocephalic and atraumatic.   Eyes:      Conjunctiva/sclera: Conjunctivae normal.      Pupils: Pupils are equal, round, and reactive to light.   Neck:      Thyroid: No thyromegaly.      Vascular: No JVD.   Cardiovascular:      Rate and Rhythm: Normal rate. Rhythm irregular.      Heart sounds: Normal heart sounds. No murmur heard.     No friction rub. No gallop.   Pulmonary:      Effort: Pulmonary effort is normal.      Breath sounds: Normal breath sounds.   Musculoskeletal:         General: No tenderness or deformity.      Cervical back: Neck supple.      Right lower leg: Edema present.      Left lower leg: Edema present.   Skin:     General: Skin is warm and dry.      Findings: No erythema or rash.   Neurological:      General: No focal deficit present.      Mental Status: He is alert. Mental status is at baseline.      Cranial Nerves: No cranial nerve deficit.   Psychiatric:         Mood and Affect: Mood normal.         Behavior: Behavior normal.         Judgment: Judgment normal.         Labs: reviewed    Imaging: No results found.    ECG: " Atrial fibrillation at 80 bpm    Discussion/Summary:  Assessment & Plan  Chronic combined systolic and diastolic CHF (congestive heart failure) (HCC)  Wt Readings from Last 3 Encounters:   01/06/25 70.3 kg (155 lb)   12/30/24 69.9 kg (154 lb)   12/18/24 62.1 kg (137 lb 0.3 oz)       - Continue torsemide 40 mg daily.  He is following closely with nephrology.    - Monitor daily weights    Paroxysmal atrial fibrillation (HCC)  - Continue Eliquis 2.5 mg twice a day  - continue carvedilol 6.25 mg b.i.d.  PVC (premature ventricular contraction)  - continue carvedilol 6.25 mg b.i.d.  Essential hypertension  - continue carvedilol 6.25 mg b.i.d.

## 2025-01-07 NOTE — ASSESSMENT & PLAN NOTE
Wt Readings from Last 3 Encounters:   01/06/25 70.3 kg (155 lb)   12/30/24 69.9 kg (154 lb)   12/18/24 62.1 kg (137 lb 0.3 oz)       - Continue torsemide 40 mg daily.  He is following closely with nephrology.    - Monitor daily weights

## 2025-01-10 ENCOUNTER — PATIENT OUTREACH (OUTPATIENT)
Dept: CASE MANAGEMENT | Facility: OTHER | Age: 85
End: 2025-01-10

## 2025-01-10 NOTE — PROGRESS NOTES
"[facility] Follow Up Note:  Followup reminder received.  Chart review completed.  Outside records through Care Everywhere requested and refreshed.  There have been no unplanned admissions or ED visits in the past 7 days.  Patient had follow up OV with  Nephrology on 12/30/24 and with  Cardio provider on 1/6/25.  OV notes and treatment plans reviewed.   Treatment plan is as follows:  - Blood work ordered by nephrology to be completed around 3/1/25  - Cardio changed dosing and timing of glimepiride (Amaryl) - new dose:  4mg daily with dinner    Telephone call attempted today with patient's son for follow up.  Son reports patient saw his PCP today.  Son reports patient had a 5 lb weight gain in past 5 days.  Reports current weight as 160 lbs.  Son reports PCP increased torsemide dosing from 40 mg to 60 mg X 3 days.      Patient also had a urology appointment on 1/08/25.  Victor catheter removed at that time.     Left voicemail message with general contact information with name, title, call back phone number office hours when I am available and message encouraging return call to this CM.    Unable to leave message as there was no voice mailbox set up.   Mailbox is full and unable to leave message.    Son also reports the following \"yellow\" flag symptoms:   Have you gained more than 2 - 3 lbs overnight, or 5 lbs in 1 week? yes  Have you been feeling more tired or fatigued? Yes.  However, son reports patient has been getting up more frequently during the night since victor was removed earlier in the week  Knows when to call provider: Reinforced.    IB Message sent to Cardio clinical and provider with above update.     Will continue to monitor.     "

## 2025-01-14 ENCOUNTER — APPOINTMENT (EMERGENCY)
Dept: RADIOLOGY | Facility: HOSPITAL | Age: 85
DRG: 872 | End: 2025-01-14
Payer: MEDICARE

## 2025-01-14 ENCOUNTER — PATIENT OUTREACH (OUTPATIENT)
Dept: CASE MANAGEMENT | Facility: OTHER | Age: 85
End: 2025-01-14

## 2025-01-14 ENCOUNTER — HOSPITAL ENCOUNTER (INPATIENT)
Facility: HOSPITAL | Age: 85
LOS: 3 days | Discharge: HOME HEALTH | DRG: 872 | End: 2025-01-18
Attending: EMERGENCY MEDICINE | Admitting: INTERNAL MEDICINE
Payer: MEDICARE

## 2025-01-14 DIAGNOSIS — N39.0 UTI (URINARY TRACT INFECTION): Primary | ICD-10-CM

## 2025-01-14 DIAGNOSIS — N39.0 URINARY TRACT INFECTION: ICD-10-CM

## 2025-01-14 DIAGNOSIS — N18.4 CHRONIC KIDNEY DISEASE, STAGE 4 (SEVERE) (HCC): ICD-10-CM

## 2025-01-14 DIAGNOSIS — R53.1 GENERALIZED WEAKNESS: ICD-10-CM

## 2025-01-14 LAB
2HR DELTA HS TROPONIN: -2 NG/L
4HR DELTA HS TROPONIN: -2 NG/L
ALBUMIN SERPL BCG-MCNC: 3.1 G/DL (ref 3.5–5)
ALP SERPL-CCNC: 147 U/L (ref 34–104)
ALT SERPL W P-5'-P-CCNC: 24 U/L (ref 7–52)
ANION GAP SERPL CALCULATED.3IONS-SCNC: 11 MMOL/L (ref 4–13)
APTT PPP: 64 SECONDS (ref 23–34)
AST SERPL W P-5'-P-CCNC: 19 U/L (ref 13–39)
ATRIAL RATE: 50 BPM
BACTERIA UR QL AUTO: ABNORMAL /HPF
BASOPHILS # BLD AUTO: 0 THOUSANDS/ΜL (ref 0–0.1)
BASOPHILS NFR BLD AUTO: 0 % (ref 0–1)
BILIRUB SERPL-MCNC: 0.63 MG/DL (ref 0.2–1)
BILIRUB UR QL STRIP: NEGATIVE
BNP SERPL-MCNC: 193 PG/ML (ref 0–100)
BUN SERPL-MCNC: 95 MG/DL (ref 5–25)
CALCIUM ALBUM COR SERPL-MCNC: 8.9 MG/DL (ref 8.3–10.1)
CALCIUM SERPL-MCNC: 8.2 MG/DL (ref 8.4–10.2)
CARDIAC TROPONIN I PNL SERPL HS: 11 NG/L (ref ?–50)
CARDIAC TROPONIN I PNL SERPL HS: 11 NG/L (ref ?–50)
CARDIAC TROPONIN I PNL SERPL HS: 13 NG/L (ref ?–50)
CHLORIDE SERPL-SCNC: 94 MMOL/L (ref 96–108)
CLARITY UR: ABNORMAL
CO2 SERPL-SCNC: 31 MMOL/L (ref 21–32)
COLOR UR: ABNORMAL
CREAT SERPL-MCNC: 2.32 MG/DL (ref 0.6–1.3)
EOSINOPHIL # BLD AUTO: 0.01 THOUSAND/ΜL (ref 0–0.61)
EOSINOPHIL NFR BLD AUTO: 0 % (ref 0–6)
ERYTHROCYTE [DISTWIDTH] IN BLOOD BY AUTOMATED COUNT: 16.9 % (ref 11.6–15.1)
FLUAV AG UPPER RESP QL IA.RAPID: NEGATIVE
FLUBV AG UPPER RESP QL IA.RAPID: NEGATIVE
GFR SERPL CREATININE-BSD FRML MDRD: 24 ML/MIN/1.73SQ M
GLUCOSE SERPL-MCNC: 209 MG/DL (ref 65–140)
GLUCOSE SERPL-MCNC: 219 MG/DL (ref 65–140)
GLUCOSE UR STRIP-MCNC: NEGATIVE MG/DL
HCT VFR BLD AUTO: 32.8 % (ref 36.5–49.3)
HGB BLD-MCNC: 10.6 G/DL (ref 12–17)
HGB UR QL STRIP.AUTO: ABNORMAL
IMM GRANULOCYTES # BLD AUTO: 0.02 THOUSAND/UL (ref 0–0.2)
IMM GRANULOCYTES NFR BLD AUTO: 1 % (ref 0–2)
INR PPP: 2.26 (ref 0.85–1.19)
KETONES UR STRIP-MCNC: NEGATIVE MG/DL
LACTATE SERPL-SCNC: 0.6 MMOL/L (ref 0.5–2)
LEUKOCYTE ESTERASE UR QL STRIP: ABNORMAL
LIPASE SERPL-CCNC: 108 U/L (ref 11–82)
LYMPHOCYTES # BLD AUTO: 0.49 THOUSANDS/ΜL (ref 0.6–4.47)
LYMPHOCYTES NFR BLD AUTO: 12 % (ref 14–44)
MCH RBC QN AUTO: 31.7 PG (ref 26.8–34.3)
MCHC RBC AUTO-ENTMCNC: 32.3 G/DL (ref 31.4–37.4)
MCV RBC AUTO: 98 FL (ref 82–98)
MONOCYTES # BLD AUTO: 0.47 THOUSAND/ΜL (ref 0.17–1.22)
MONOCYTES NFR BLD AUTO: 11 % (ref 4–12)
NEUTROPHILS # BLD AUTO: 3.13 THOUSANDS/ΜL (ref 1.85–7.62)
NEUTS SEG NFR BLD AUTO: 76 % (ref 43–75)
NITRITE UR QL STRIP: NEGATIVE
NON-SQ EPI CELLS URNS QL MICRO: ABNORMAL /HPF
NRBC BLD AUTO-RTO: 0 /100 WBCS
PH UR STRIP.AUTO: 8 [PH]
PLATELET # BLD AUTO: 102 THOUSANDS/UL (ref 149–390)
PMV BLD AUTO: 10.3 FL (ref 8.9–12.7)
POTASSIUM SERPL-SCNC: 5.1 MMOL/L (ref 3.5–5.3)
PROCALCITONIN SERPL-MCNC: 0.28 NG/ML
PROT SERPL-MCNC: 5.8 G/DL (ref 6.4–8.4)
PROT UR STRIP-MCNC: ABNORMAL MG/DL
PROTHROMBIN TIME: 25.3 SECONDS (ref 12.3–15)
QRS AXIS: -3 DEGREES
QRSD INTERVAL: 88 MS
QT INTERVAL: 432 MS
QTC INTERVAL: 398 MS
RBC # BLD AUTO: 3.34 MILLION/UL (ref 3.88–5.62)
RBC #/AREA URNS AUTO: ABNORMAL /HPF
SARS-COV+SARS-COV-2 AG RESP QL IA.RAPID: NEGATIVE
SODIUM SERPL-SCNC: 136 MMOL/L (ref 135–147)
SP GR UR STRIP.AUTO: 1.02 (ref 1–1.03)
T WAVE AXIS: 33 DEGREES
UROBILINOGEN UR STRIP-ACNC: <2 MG/DL
VENTRICULAR RATE: 51 BPM
WBC # BLD AUTO: 4.12 THOUSAND/UL (ref 4.31–10.16)
WBC #/AREA URNS AUTO: ABNORMAL /HPF

## 2025-01-14 PROCEDURE — 99285 EMERGENCY DEPT VISIT HI MDM: CPT | Performed by: EMERGENCY MEDICINE

## 2025-01-14 PROCEDURE — 80053 COMPREHEN METABOLIC PANEL: CPT | Performed by: EMERGENCY MEDICINE

## 2025-01-14 PROCEDURE — 99223 1ST HOSP IP/OBS HIGH 75: CPT | Performed by: INTERNAL MEDICINE

## 2025-01-14 PROCEDURE — 82948 REAGENT STRIP/BLOOD GLUCOSE: CPT

## 2025-01-14 PROCEDURE — 85610 PROTHROMBIN TIME: CPT | Performed by: EMERGENCY MEDICINE

## 2025-01-14 PROCEDURE — 81001 URINALYSIS AUTO W/SCOPE: CPT | Performed by: EMERGENCY MEDICINE

## 2025-01-14 PROCEDURE — 85025 COMPLETE CBC W/AUTO DIFF WBC: CPT | Performed by: EMERGENCY MEDICINE

## 2025-01-14 PROCEDURE — 83605 ASSAY OF LACTIC ACID: CPT | Performed by: EMERGENCY MEDICINE

## 2025-01-14 PROCEDURE — 87086 URINE CULTURE/COLONY COUNT: CPT | Performed by: EMERGENCY MEDICINE

## 2025-01-14 PROCEDURE — 83690 ASSAY OF LIPASE: CPT | Performed by: EMERGENCY MEDICINE

## 2025-01-14 PROCEDURE — 84484 ASSAY OF TROPONIN QUANT: CPT | Performed by: EMERGENCY MEDICINE

## 2025-01-14 PROCEDURE — 87186 SC STD MICRODIL/AGAR DIL: CPT | Performed by: EMERGENCY MEDICINE

## 2025-01-14 PROCEDURE — 85730 THROMBOPLASTIN TIME PARTIAL: CPT | Performed by: EMERGENCY MEDICINE

## 2025-01-14 PROCEDURE — 36415 COLL VENOUS BLD VENIPUNCTURE: CPT | Performed by: EMERGENCY MEDICINE

## 2025-01-14 PROCEDURE — 99285 EMERGENCY DEPT VISIT HI MDM: CPT

## 2025-01-14 PROCEDURE — 84145 PROCALCITONIN (PCT): CPT | Performed by: EMERGENCY MEDICINE

## 2025-01-14 PROCEDURE — 93010 ELECTROCARDIOGRAM REPORT: CPT | Performed by: INTERNAL MEDICINE

## 2025-01-14 PROCEDURE — 87804 INFLUENZA ASSAY W/OPTIC: CPT | Performed by: EMERGENCY MEDICINE

## 2025-01-14 PROCEDURE — 83880 ASSAY OF NATRIURETIC PEPTIDE: CPT | Performed by: EMERGENCY MEDICINE

## 2025-01-14 PROCEDURE — 87811 SARS-COV-2 COVID19 W/OPTIC: CPT | Performed by: EMERGENCY MEDICINE

## 2025-01-14 PROCEDURE — 93005 ELECTROCARDIOGRAM TRACING: CPT

## 2025-01-14 PROCEDURE — 87077 CULTURE AEROBIC IDENTIFY: CPT | Performed by: EMERGENCY MEDICINE

## 2025-01-14 PROCEDURE — 87040 BLOOD CULTURE FOR BACTERIA: CPT | Performed by: EMERGENCY MEDICINE

## 2025-01-14 PROCEDURE — 96365 THER/PROPH/DIAG IV INF INIT: CPT

## 2025-01-14 PROCEDURE — 71045 X-RAY EXAM CHEST 1 VIEW: CPT

## 2025-01-14 RX ORDER — ALLOPURINOL 100 MG/1
100 TABLET ORAL 2 TIMES DAILY
Status: DISCONTINUED | OUTPATIENT
Start: 2025-01-14 | End: 2025-01-18 | Stop reason: HOSPADM

## 2025-01-14 RX ORDER — CEFTRIAXONE 1 G/50ML
1000 INJECTION, SOLUTION INTRAVENOUS ONCE
Status: COMPLETED | OUTPATIENT
Start: 2025-01-14 | End: 2025-01-14

## 2025-01-14 RX ORDER — TAMSULOSIN HYDROCHLORIDE 0.4 MG/1
0.4 CAPSULE ORAL
Status: DISCONTINUED | OUTPATIENT
Start: 2025-01-14 | End: 2025-01-18 | Stop reason: HOSPADM

## 2025-01-14 RX ORDER — TIMOLOL MALEATE 5 MG/ML
1 SOLUTION/ DROPS OPHTHALMIC DAILY
Status: DISCONTINUED | OUTPATIENT
Start: 2025-01-15 | End: 2025-01-18 | Stop reason: HOSPADM

## 2025-01-14 RX ORDER — FAMOTIDINE 20 MG/1
10 TABLET, FILM COATED ORAL
Status: DISCONTINUED | OUTPATIENT
Start: 2025-01-14 | End: 2025-01-18 | Stop reason: HOSPADM

## 2025-01-14 RX ORDER — TORSEMIDE 20 MG/1
40 TABLET ORAL 2 TIMES DAILY
Status: DISCONTINUED | OUTPATIENT
Start: 2025-01-14 | End: 2025-01-18 | Stop reason: HOSPADM

## 2025-01-14 RX ORDER — FERROUS SULFATE 325(65) MG
325 TABLET ORAL
Status: DISCONTINUED | OUTPATIENT
Start: 2025-01-15 | End: 2025-01-18 | Stop reason: HOSPADM

## 2025-01-14 RX ORDER — ACETAMINOPHEN 325 MG/1
650 TABLET ORAL EVERY 6 HOURS PRN
Status: DISCONTINUED | OUTPATIENT
Start: 2025-01-14 | End: 2025-01-18 | Stop reason: HOSPADM

## 2025-01-14 RX ORDER — ATORVASTATIN CALCIUM 40 MG/1
40 TABLET, FILM COATED ORAL
Status: DISCONTINUED | OUTPATIENT
Start: 2025-01-14 | End: 2025-01-18 | Stop reason: HOSPADM

## 2025-01-14 RX ORDER — CEFTRIAXONE 1 G/50ML
1000 INJECTION, SOLUTION INTRAVENOUS EVERY 24 HOURS
Status: DISCONTINUED | OUTPATIENT
Start: 2025-01-15 | End: 2025-01-18 | Stop reason: HOSPADM

## 2025-01-14 RX ADMIN — ALLOPURINOL 100 MG: 100 TABLET ORAL at 18:24

## 2025-01-14 RX ADMIN — TAMSULOSIN HYDROCHLORIDE 0.4 MG: 0.4 CAPSULE ORAL at 18:24

## 2025-01-14 RX ADMIN — APIXABAN 2.5 MG: 2.5 TABLET, FILM COATED ORAL at 18:24

## 2025-01-14 RX ADMIN — CEFTRIAXONE 1000 MG: 1 INJECTION, SOLUTION INTRAVENOUS at 15:26

## 2025-01-14 RX ADMIN — ATORVASTATIN CALCIUM 40 MG: 40 TABLET, FILM COATED ORAL at 18:24

## 2025-01-14 RX ADMIN — TORSEMIDE 40 MG: 20 TABLET ORAL at 18:24

## 2025-01-14 RX ADMIN — FAMOTIDINE 10 MG: 20 TABLET, FILM COATED ORAL at 21:16

## 2025-01-14 NOTE — ASSESSMENT & PLAN NOTE
Dupont recently removed 01/08  UA positive for leukocytes, WBC, bacteria  Prior urine cultures grew proteus susceptible to cefazolin  Continue IV rocephin for now  Urine culture pending

## 2025-01-14 NOTE — ASSESSMENT & PLAN NOTE
Wt Readings from Last 3 Encounters:   01/14/25 69.9 kg (154 lb 1.6 oz)   01/06/25 70.3 kg (155 lb)   12/30/24 69.9 kg (154 lb)     ECHO 12/2024: EF 45%, systolic function mildly reduced, diastolic function abnormal  CXR: Layering small bilateral pleural effusions with probable adjacent parenchymal atelectasis, similar to prior study. Pulmonary vascular congestion.  , lower than previous values  Weight stable  Appears euvolemic  Son states patient had weight gain last week and was instructed by his PCP to take torsemide 60 mg twice daily x 3 days which he completed yesterday  Continue home torsemide 40 mg bid  Monitor Is/Os and daily weights  Salt and fluid restriction

## 2025-01-14 NOTE — ASSESSMENT & PLAN NOTE
"Lab Results   Component Value Date    HGBA1C 7.7 (H) 12/07/2024       No results for input(s): \"POCGLU\" in the last 72 hours.    Blood Sugar Average: Last 72 hrs:  hold home glimepiride  SSI  Diabetic diet  "

## 2025-01-14 NOTE — Clinical Note
Case was discussed with Dr. Pereyra and the patient's admission status was agreed to be Admission Status: inpatient status to the service of Dr. Pereyra .

## 2025-01-14 NOTE — PROGRESS NOTES
Reminder received.  30 day CHF  monitoring completed.  Outside records through Care Everywhere requested and refreshed.  Chart review completed.  There have been no unplanned hospitalizations or ED visits for CHF exacerbations since last telephone outreach or chart review.  Safe Transitions episode closed and I removed myself from care team.

## 2025-01-14 NOTE — ASSESSMENT & PLAN NOTE
Continue flomax  Dupont inserted last admission for urinary retention, recently removed in urology office 01/08  Check PVR

## 2025-01-14 NOTE — ASSESSMENT & PLAN NOTE
Evidenced by hypothermia, tachypnea, tachycardia in setting of likely UTI given recent removal of victor catheter  CXR negative for source of infection  COVID/flu/RSV negative  Procalcitonin mildly elevated though nonspecific in setting of CKD  Lactate negative  UA positive for leukocytes, WBC, bacteria  Blood cultures pending  Urine culture pending  Continue rocephin  Continue yola hugger until temperature improves  Trend cbc and fever curve

## 2025-01-14 NOTE — ASSESSMENT & PLAN NOTE
Lab Results   Component Value Date    EGFR 24 01/14/2025    EGFR 26 12/15/2024    EGFR 27 12/14/2024    CREATININE 2.32 (H) 01/14/2025    CREATININE 2.21 (H) 12/15/2024    CREATININE 2.11 (H) 12/14/2024   Follows with Dr. Guadarrama outpatient  Baseline Cr 2.5-3.0  Currently stable at baseline  Daily bmp

## 2025-01-14 NOTE — ED PROVIDER NOTES
Time reflects when diagnosis was documented in both MDM as applicable and the Disposition within this note       Time User Action Codes Description Comment    1/14/2025  3:24 PM Jg Bell Add [N39.0] UTI (urinary tract infection)     1/14/2025  3:24 PM Jg Bell [R53.1] Generalized weakness           ED Disposition       ED Disposition   Admit    Condition   Stable    Date/Time   Tue Jan 14, 2025  3:25 PM    Comment   Case was discussed with Dr. Pereyra and the patient's admission status was agreed to be Admission Status: observation status to the service of Dr. Pereyra .               Assessment & Plan       Medical Decision Making  Pulse ox 98% on room air indicating adequate oxygenation.  CXR: Mild PVC right side effusion as read by me    Differential diagnose include but not limited to arrhythmia, ACS, CHF, UTI, at risk for sepsis        Amount and/or Complexity of Data Reviewed  Labs: ordered.  Radiology: ordered and independent interpretation performed.  ECG/medicine tests: ordered and independent interpretation performed.    Risk  Prescription drug management.  Decision regarding hospitalization.             Medications   cefTRIAXone (ROCEPHIN) IVPB (premix in dextrose) 1,000 mg 50 mL (0 mg Intravenous Stopped 1/14/25 1556)       ED Risk Strat Scores                          SBIRT 20yo+      Flowsheet Row Most Recent Value   Initial Alcohol Screen: US AUDIT-C     1. How often do you have a drink containing alcohol? 0 Filed at: 01/14/2025 1232   3b. FEMALE Any Age, or MALE 65+: How often do you have 4 or more drinks on one occassion? 0 Filed at: 01/14/2025 1232   Audit-C Score 0 Filed at: 01/14/2025 1232   ALEJANDRO: How many times in the past year have you...    Used an illegal drug or used a prescription medication for non-medical reasons? Never Filed at: 01/14/2025 1232                            History of Present Illness       Chief Complaint   Patient presents with    Weakness - Generalized      Brought by son with staff going to car to get into wheelchair. Son lives with patient. States seen by Physical therapist at home and advised to go to ED for generalized weakness. Patient had victor catheter removed last week and concern for UTI. Patient seen last week at PCP for ear pain and son states patient had a cold. Patient is slow to respond, hands cold.Able to get only a temporal temp of 94.6       Past Medical History:   Diagnosis Date    Atrial fibrillation (HCC)     BPH (benign prostatic hyperplasia)     CHF (congestive heart failure) (HCC)     Chronic kidney disease     Coronary artery disease     Diabetes mellitus (HCC)     Hyperlipidemia     Hypertension     Hyponatremia 12/07/2024      Past Surgical History:   Procedure Laterality Date    CARDIAC CATHETERIZATION N/A 6/30/2023    Procedure: Cardiac pericardiocentesis;  Surgeon: Shanna Adame DO;  Location: [facility];  Service: Cardiology    CARDIAC CATHETERIZATION N/A 6/30/2023    Procedure: Cardiac RHC;  Surgeon: Shanna Adame DO;  Location: [facility];  Service: Cardiology    EYE SURGERY      IR CHEST TUBE PLACEMENT  6/24/2023    IR CHEST TUBE PLACEMENT  7/28/2023    IR PLEURAL EFFUSION LONG-TERM CATHETER PLACEMENT  8/7/2023    IR PLEURAL EFFUSION LONG-TERM CATHETER REMOVAL  1/3/2024    IR THORACENTESIS  12/11/2023    SKIN CANCER EXCISION      TONSILLECTOMY        Family History   Problem Relation Age of Onset    Heart disease Mother     Diabetes Father     Vision loss Father     Cancer Sister     Diabetes Sister       Social History     Tobacco Use    Smoking status: Never    Smokeless tobacco: Former    Tobacco comments:     Smoked a pipe 50 years ago   Vaping Use    Vaping status: Never Used   Substance Use Topics    Alcohol use: Not Currently     Alcohol/week: 0.0 standard drinks of alcohol     Comment: special occasions    Drug use: Not Currently      E-Cigarette/Vaping    E-Cigarette Use Never User        E-Cigarette/Vaping Substances    Nicotine No     THC No     CBD No     Flavoring No     Other No     Unknown No       I have reviewed and agree with the history as documented.     Patient presents to the ER for evaluation of generalized weakness.  Patient was advised by visiting nurse as well as visiting PT to come to the ER for evaluation.  They felt he had increased generalized weakness and more concern for infection.  Patient states he just feels tired.  Son states he has noticed that he has been getting progressively weak over the last few days.      History provided by:  Patient   used: No        Review of Systems   Constitutional:  Positive for fatigue.   All other systems reviewed and are negative.          Objective       ED Triage Vitals   Temperature Pulse Blood Pressure Respirations SpO2 Patient Position - Orthostatic VS   01/14/25 1228 01/14/25 1228 01/14/25 1228 01/14/25 1228 01/14/25 1228 01/14/25 1228   (!) 94.6 °F (34.8 °C) 55 125/57 (!) 24 100 % Sitting      Temp Source Heart Rate Source BP Location FiO2 (%) Pain Score    01/14/25 1228 01/14/25 1228 01/14/25 1228 -- 01/14/25 1811    Temporal Monitor Left arm  No Pain      Vitals      Date and Time Temp Pulse SpO2 Resp BP Pain Score FACES Pain Rating User   01/15/25 1530 -- -- -- -- -- No Pain -- CKH   01/15/25 1447 -- 73 98 % -- 117/56 -- -- DII   01/15/25 1053 -- -- -- -- -- No Pain -- JK   01/15/25 0900 -- -- -- -- -- No Pain -- CG   01/15/25 0841 99 °F (37.2 °C) 77 98 % -- 111/54 -- -- DII   01/15/25 0523 99.8 °F (37.7 °C) -- -- -- -- -- -- DN   01/15/25 0345 98.8 °F (37.1 °C) -- -- -- -- -- -- DN   01/15/25 0200 98.1 °F (36.7 °C) -- -- -- -- -- -- DN   01/14/25 2307 97.6 °F (36.4 °C) 75 98 % -- 112/52 -- -- DII   01/14/25 2240 97.3 °F (36.3 °C) 62 97 % 19 108/52 -- -- DII   01/14/25 2130 94.3 °F (34.6 °C) -- -- -- -- -- -- RW   01/14/25 2103 -- -- -- -- -- No Pain -- RW   01/14/25 2026 96.2 °F (35.7 °C) -- -- -- -- -- --     01/14/25 2026 -- 67 97 % -- 106/62 -- -- DII   01/14/25 1812 -- -- 99 % -- -- -- -- MS   01/14/25 1811 -- -- -- -- -- No Pain -- MS   01/14/25 1657 -- 60 99 % -- 134/70 -- -- DII   01/14/25 1655 -- 60 98 % -- 134/70 -- -- DII   01/14/25 1619 92.2 °F (33.4 °C) -- -- -- -- -- -- CS   01/14/25 1523 -- 72 100 % 22 119/64 -- -- CS   01/14/25 1315 -- 107 100 % 24 123/64 -- -- CS   01/14/25 1301 92.1 °F (33.4 °C) -- -- -- -- -- -- CS   01/14/25 1231 -- -- -- 36 -- -- -- SW   01/14/25 1228 94.6 °F (34.8 °C) 55 100 % 24 125/57 -- -- SW            Physical Exam  Vitals and nursing note reviewed.   Constitutional:       General: He is not in acute distress.     Appearance: He is ill-appearing.   Cardiovascular:      Rate and Rhythm: Normal rate and regular rhythm.   Pulmonary:      Effort: Pulmonary effort is normal. No respiratory distress.      Breath sounds: Rales present.      Comments: Bibasilar rales  Abdominal:      Tenderness: There is no abdominal tenderness.   Neurological:      General: No focal deficit present.      Mental Status: He is alert and oriented to person, place, and time.         Results Reviewed       Procedure Component Value Units Date/Time    Urine culture [[id number]]  (Abnormal) Collected: 01/14/25 1522    Lab Status: Preliminary result Specimen: Urine, Clean Catch Updated: 01/15/25 1611     Urine Culture >100,000 cfu/ml Proteus species    Blood culture #2 [[id number]] Collected: 01/14/25 1525    Lab Status: Preliminary result Specimen: Blood from Arm, Left Updated: 01/14/25 2353     Blood Culture Received in Microbiology Lab. Culture in Progress.    Blood culture #1 [[id number]] Collected: 01/14/25 1520    Lab Status: Preliminary result Specimen: Blood from Arm, Left Updated: 01/14/25 2301     Blood Culture Received in Microbiology Lab. Culture in Progress.    HS Troponin I 4hr [[id number]]  (Normal) Collected: 01/14/25 1738    Lab Status: Final result Specimen: Blood from Arm, Left Updated:  01/14/25 1813     hs TnI 4hr 11 ng/L      Delta 4hr hsTnI -2 ng/L     Lactic acid, plasma (w/reflex if result > 2.0) [[id number]]  (Normal) Collected: 01/14/25 1520    Lab Status: Final result Specimen: Blood from Arm, Left Updated: 01/14/25 1609     LACTIC ACID 0.6 mmol/L     Narrative:      Result may be elevated if tourniquet was used during collection.    HS Troponin I 2hr [[id number]]  (Normal) Collected: 01/14/25 1521    Lab Status: Final result Specimen: Blood from Arm, Right Updated: 01/14/25 1608     hs TnI 2hr 11 ng/L      Delta 2hr hsTnI -2 ng/L     Urine Microscopic [[id number]]  (Abnormal) Collected: 01/14/25 1440    Lab Status: Final result Specimen: Urine, Straight Cath Updated: 01/14/25 1519     RBC, UA 0-1 /hpf      WBC, UA 4-10 /hpf      Epithelial Cells None Seen /hpf      Bacteria, UA Innumerable /hpf     UA (URINE) with reflex to Scope [[id number]]  (Abnormal) Collected: 01/14/25 1440    Lab Status: Final result Specimen: Urine, Straight Cath Updated: 01/14/25 1448     Color, UA Light Yellow     Clarity, UA Cloudy     Specific Gravity, UA 1.020     pH, UA 8.0     Leukocytes, UA Large     Nitrite, UA Negative     Protein, UA 30 (1+) mg/dl      Glucose, UA Negative mg/dl      Ketones, UA Negative mg/dl      Urobilinogen, UA <2.0 mg/dl      Bilirubin, UA Negative     Occult Blood, UA Trace    Procalcitonin [[id number]]  (Abnormal) Collected: 01/14/25 1307    Lab Status: Final result Specimen: Blood from Arm, Right Updated: 01/14/25 1345     Procalcitonin 0.28 ng/ml     HS Troponin 0hr (reflex protocol) [[id number]]  (Normal) Collected: 01/14/25 1307    Lab Status: Final result Specimen: Blood from Arm, Right Updated: 01/14/25 1343     hs TnI 0hr 13 ng/L     B-Type Natriuretic Peptide(BNP) [[id number]]  (Abnormal) Collected: 01/14/25 1307    Lab Status: Final result Specimen: Blood from Arm, Right Updated: 01/14/25 1341      pg/mL     Comprehensive metabolic panel [[id number]]  (Abnormal) Collected:  01/14/25 1307    Lab Status: Final result Specimen: Blood from Arm, Right Updated: 01/14/25 1338     Sodium 136 mmol/L      Potassium 5.1 mmol/L      Chloride 94 mmol/L      CO2 31 mmol/L      ANION GAP 11 mmol/L      BUN 95 mg/dL      Creatinine 2.32 mg/dL      Glucose 209 mg/dL      Calcium 8.2 mg/dL      Corrected Calcium 8.9 mg/dL      AST 19 U/L      ALT 24 U/L      Alkaline Phosphatase 147 U/L      Total Protein 5.8 g/dL      Albumin 3.1 g/dL      Total Bilirubin 0.63 mg/dL      eGFR 24 ml/min/1.73sq m     Narrative:      National Kid[employer] guidelines for Chronic Kidney Disease (CKD):     Stage 1 with normal or high GFR (GFR > 90 mL/min/1.73 square meters)    Stage 2 Mild CKD (GFR = 60-89 mL/min/1.73 square meters)    Stage 3A Moderate CKD (GFR = 45-59 mL/min/1.73 square meters)    Stage 3B Moderate CKD (GFR = 30-44 mL/min/1.73 square meters)    Stage 4 Severe CKD (GFR = 15-29 mL/min/1.73 square meters)    Stage 5 End Stage CKD (GFR <15 mL/min/1.73 square meters)  Note: GFR calculation is accurate only with a steady state creatinine    Lipase [[id number]]  (Abnormal) Collected: 01/14/25 1307    Lab Status: Final result Specimen: Blood from Arm, Right Updated: 01/14/25 1338     Lipase 108 u/L     CBC and differential [[id number]]  (Abnormal) Collected: 01/14/25 1307    Lab Status: Final result Specimen: Blood from Arm, Right Updated: 01/14/25 1336     WBC 4.12 Thousand/uL      RBC 3.34 Million/uL      Hemoglobin 10.6 g/dL      Hematocrit 32.8 %      MCV 98 fL      MCH 31.7 pg      MCHC 32.3 g/dL      RDW 16.9 %      MPV 10.3 fL      Platelets 102 Thousands/uL      nRBC 0 /100 WBCs      Segmented % 76 %      Immature Grans % 1 %      Lymphocytes % 12 %      Monocytes % 11 %      Eosinophils Relative 0 %      Basophils Relative 0 %      Absolute Neutrophils 3.13 Thousands/µL      Absolute Immature Grans 0.02 Thousand/uL      Absolute Lymphocytes 0.49 Thousands/µL      Absolute Monocytes 0.47  Thousand/µL      Eosinophils Absolute 0.01 Thousand/µL      Basophils Absolute 0.00 Thousands/µL     Narrative:      This is an appended report.  These results have been appended to a previously verified report.    FLU/COVID Rapid Antigen (30 min. TAT) - Preferred screening test in ED [[id number]]  (Normal) Collected: 01/14/25 1307    Lab Status: Final result Specimen: Nares from Nose Updated: 01/14/25 1333     SARS COV Rapid Antigen Negative     Influenza A Rapid Antigen Negative     Influenza B Rapid Antigen Negative    Narrative:      This test has been performed using the [employer] Maura 2 FLU+SARS Antigen test under the Emergency Use Authorization (EUA). This test has been validated by the  and verified by the performing laboratory. The Maura uses lateral flow immunofluorescent sandwich assay to detect SARS-COV, Influenza A and Influenza B Antigen.     The [employer] Maura 2 SARS Antigen test does not differentiate between SARS-CoV and SARS-CoV-2.     Negative results are presumptive and may be confirmed with a molecular assay, if necessary, for patient management. Negative results do not rule out SARS-CoV-2 or influenza infection and should not be used as the sole basis for treatment or patient management decisions. A negative test result may occur if the level of antigen in a sample is below the limit of detection of this test.     Positive results are indicative of the presence of viral antigens, but do not rule out bacterial infection or co-infection with other viruses.     All test results should be used as an adjunct to clinical observations and other information available to the provider.    FOR PEDIATRIC PATIENTS - copy/paste COVID Guidelines URL to browser: [email or web address]    Protime-INR [[id number]]  (Abnormal) Collected: 01/14/25 1307    Lab Status: Final result Specimen: Blood from Arm, Right Updated: 01/14/25 1332     Protime 25.3  seconds      INR 2.26    Narrative:      INR Therapeutic Range    Indication                                             INR Range      Atrial Fibrillation                                               2.0-3.0  Hypercoagulable State                                    2.0.2.3  Left Ventricular Asist Device                            2.0-3.0  Mechanical Heart Valve                                  -    Aortic(with afib, MI, embolism, HF, LA enlargement,    and/or coagulopathy)                                     2.0-3.0 (2.5-3.5)     Mitral                                                             2.5-3.5  Prosthetic/Bioprosthetic Heart Valve               2.0-3.0  Venous thromboembolism (VTE: VT, PE        2.0-3.0    APTT [[id number]]  (Abnormal) Collected: 25 1307    Lab Status: Final result Specimen: Blood from Arm, Right Updated: 25 1332     PTT 64 seconds             XR chest 1 view portable   Final Interpretation by Bettie Ramirez MD ( [id number])   Layering small bilateral pleural effusions with probable adjacent parenchymal atelectasis, similar to prior study.   Pulmonary vascular congestion.   Unchanged cardiomegaly.            Workstation performed: [id number]             ECG 12 Lead Documentation Only    Date/Time: 2025 1:07 PM    Performed by: Jg Bell DO  Authorized by: Jg Bell DO    ECG reviewed by me, the ED Provider: yes    Patient location:  ED  Previous ECG:     Previous ECG:  Compared to current    Similarity:  No change  Interpretation:     Interpretation: abnormal    Rate:     ECG rate:  51    ECG rate assessment: bradycardic    Rhythm:     Rhythm: atrial fibrillation    Ectopy:     Ectopy: none    ST segments:     ST segments:  Normal      ED Medication and Procedure Management   Prior to Admission Medications   Prescriptions Last Dose Informant Patient Reported? Taking?   ALPRAZolam (XANAX) 0.5 mg tablet Unknown Child Yes No   Si.5 mg daily at bedtime as needed  for anxiety or sleep   Blood Pressure Monitor NILTON Unknown Child No No   Sig: by Does not apply route daily   Sure Comfort Pen Needles 32G X 4 MM MISC Unknown Child Yes No   ZINC OXIDE PO Unknown Child Yes No   Sig: Take by mouth daily   allopurinol (ZYLOPRIM) 100 mg tablet Unknown Child Yes No   Sig: Take 100 mg by mouth 2 (two) times a day   apixaban (Eliquis) 2.5 mg Unknown Child No No   Sig: Take 1 tablet (2.5 mg total) by mouth 2 (two) times a day   ascorbic acid (VITAMIN C) 500 MG tablet Unknown Child No No   Sig: Take 1 tablet (500 mg total) by mouth daily   atorvastatin (LIPITOR) 40 mg tablet Unknown Child No No   Sig: Take 1 tablet (40 mg total) by mouth daily with dinner   cholecalciferol (VITAMIN D3) 1,000 units tablet  Child No No   Sig: Take 2 tablets (2,000 Units total) by mouth daily Do not start before July 12, 2023.   docusate sodium (COLACE) 100 mg capsule Unknown Child No No   Sig: Take 1 capsule (100 mg total) by mouth 2 (two) times a day as needed for constipation   famotidine (PEPCID) 10 mg tablet Unknown Child No No   Sig: Take 1 tablet (10 mg total) by mouth daily at bedtime   ferrous sulfate 325 (65 Fe) mg tablet Unknown Child Yes No   Sig: Take 325 mg by mouth daily with breakfast   glimepiride (AMARYL) 4 mg tablet Unknown  Yes No   Sig: Take 4 mg by mouth daily with dinner   halobetasol (ULTRAVATE) 0.05 % cream Unknown Child Yes No   insulin aspart (NovoLOG FlexPen) 100 UNIT/ML injection pen Unknown Child Yes No   latanoprost (XALATAN) 0.005 % ophthalmic solution Unknown Child Yes No   Sig: Administer 1 drop to both eyes daily at bedtime   tamsulosin (FLOMAX) 0.4 mg Unknown Child Yes No   Sig: Take 0.4 mg by mouth daily with dinner   timolol (TIMOPTIC) 0.5 % ophthalmic solution Unknown Child No No   Sig: Administer 1 drop to both eyes daily   torsemide (DEMADEX) 20 mg tablet Unknown Child No No   Sig: Take 2 tablets (40 mg total) by mouth 2 (two) times a day      Facility-Administered  Medications: None     Current Discharge Medication List        CONTINUE these medications which have NOT CHANGED    Details   allopurinol (ZYLOPRIM) 100 mg tablet Take 100 mg by mouth 2 (two) times a day      ALPRAZolam (XANAX) 0.5 mg tablet 0.5 mg daily at bedtime as needed for anxiety or sleep  Refills: 0      apixaban (Eliquis) 2.5 mg Take 1 tablet (2.5 mg total) by mouth 2 (two) times a day  Qty: 60 tablet, Refills: 11    Associated Diagnoses: Paroxysmal atrial fibrillation ([facility])      ascorbic acid (VITAMIN C) 500 MG tablet Take 1 tablet (500 mg total) by mouth daily  Refills: 0    Associated Diagnoses: Cellulitis of left hand      atorvastatin (LIPITOR) 40 mg tablet Take 1 tablet (40 mg total) by mouth daily with dinner  Qty: 30 tablet, Refills: 0    Associated Diagnoses: NSTEMI (non-ST elevated myocardial infarction) ([facility])      Blood Pressure Monitor NILTON by Does not apply route daily  Qty: 1 Device, Refills: 0    Associated Diagnoses: 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  Qty: 60 tablet, Refills: 0    Associated Diagnoses: Encephalopathy      docusate sodium (COLACE) 100 mg capsule Take 1 capsule (100 mg total) by mouth 2 (two) times a day as needed for constipation  Refills: 0    Associated Diagnoses: Constipation      famotidine (PEPCID) 10 mg tablet Take 1 tablet (10 mg total) by mouth daily at bedtime  Qty: 30 tablet, Refills: 0    Associated Diagnoses: Gastroesophageal reflux disease without esophagitis      ferrous sulfate 325 (65 Fe) mg tablet Take 325 mg by mouth daily with breakfast      glimepiride (AMARYL) 4 mg tablet Take 4 mg by mouth daily with dinner      halobetasol (ULTRAVATE) 0.05 % cream       insulin aspart (NovoLOG FlexPen) 100 UNIT/ML injection pen       latanoprost (XALATAN) 0.005 % ophthalmic solution  Administer 1 drop to both eyes daily at bedtime      Sure Comfort Pen Needles 32G X 4 MM MISC       tamsulosin (FLOMAX) 0.4 mg Take 0.4 mg by mouth daily with dinner      timolol (TIMOPTIC) 0.5 % ophthalmic solution Administer 1 drop to both eyes daily    Associated Diagnoses: Glaucoma      torsemide (DEMADEX) 20 mg tablet Take 2 tablets (40 mg total) by mouth 2 (two) times a day  Qty: 60 tablet, Refills: 0    Associated Diagnoses: CHF (congestive heart failure) ([facility])      ZINC OXIDE PO Take by mouth daily           No discharge procedures on file.  ED SEPSIS DOCUMENTATION   Time reflects when diagnosis was documented in both MDM as applicable and the Disposition within this note       Time User Action Codes Description Comment    1/14/2025  3:24 PM Jg Bell [N39.0] UTI (urinary tract infection)     1/14/2025  3:24 PM Jg Bell [R53.1] Generalized weakness                  Jg Bell DO  01/15/25 1718

## 2025-01-14 NOTE — H&P
H&P - Hospitalist   Name: Yao Tipton 84 y.o. male I MRN: [id number]  Unit/Bed#: 2 [facility] 219 A I Date of Admission: 1/14/2025   Date of Service: 1/14/2025 I Hospital Day: 0     Assessment & Plan  Generalized weakness  Patient presented to ED with generalized weakness. Son reports patient saw PCP last week for ear pain/fatigue. Had recent victor removal on 01/08 in urology office.  Noted to be hypothermic, tachcyardic, and tachypneic on arrival to ED with abnormal UA  Likely in setting of UTI, continue treatment as below  PT/OT  Sepsis (HCC)  Evidenced by hypothermia, tachypnea, tachycardia in setting of likely UTI given recent removal of victor catheter  CXR negative for source of infection  COVID/flu/RSV negative  Procalcitonin mildly elevated though nonspecific in setting of CKD  Lactate negative  UA positive for leukocytes, WBC, bacteria  Blood cultures pending  Urine culture pending  Continue rocephin  Continue yola hugger until temperature improves  Trend cbc and fever curve  Urinary tract infection  Victor recently removed 01/08  UA positive for leukocytes, WBC, bacteria  Prior urine cultures grew proteus susceptible to cefazolin  Continue IV rocephin for now  Urine culture pending  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 Pulmonary vascular congestion.  , lower than previous values  Weight stable  Appears euvolemic  Son states patient had weight gain last week and was instructed by his PCP to take torsemide 60 mg twice daily x 3 days which he completed yesterday  Continue home torsemide 40 mg bid  Monitor Is/Os and daily weights  Salt  "and fluid restriction  Essential hypertension  Continue home torsemide 40 mg bid  Diabetes mellitus with peripheral artery disease (HCC)  Lab Results   Component Value Date    HGBA1C 7.7 (H) 12/07/2024       No results for input(s): \"POCGLU\" in the last 72 hours.    Blood Sugar Average: Last 72 hrs:  hold home glimepiride  SSI  Diabetic diet  Chronic kidney disease, stage 4 (severe) (HCC)  Lab Results   Component Value Date    EGFR 24 01/14/2025    EGFR 26 12/15/2024    EGFR 27 12/14/2024    CREATININE 2.32 (H) 01/14/2025    CREATININE 2.21 (H) 12/15/2024    CREATININE 2.11 (H) 12/14/2024   Follows with Dr. Guadarrama outpatient  Baseline Cr 2.5-3.0  Currently stable at baseline  Daily bmp  Chronic atrial fibrillation (HCC)  Continue eliquis 2.5 mg bid  Not on AV donnie blocker due to bradycardia  BPH (benign prostatic hyperplasia)  Continue flomax  Dupont inserted last admission for urinary retention, recently removed in urology office 01/08  Check PVR    VTE Pharmacologic Prophylaxis: VTE Score: 5 High Risk (Score >/= 5) - Pharmacological DVT Prophylaxis Ordered: apixaban (Eliquis). Sequential Compression Devices Ordered.  Code Status: Level 3 - DNAR and DNI   Discussion with family: Updated  (son) at bedside.    Anticipated Length of Stay: Patient will be admitted on an observation basis with an anticipated length of stay of less than 2 midnights secondary to generalized weakness, UTI.    History of Present Illness   Chief Complaint: weakness    Yao Tipton is a 84 y.o. male with a PMH of combined CHF, chronic afib on eliquis, T2DM, CKD stage IV, HTN who presents with generalized weakness.  Son states patient has been more tired and weak for about 4 to 5 days.  He states he was doing well since last discharge in December and home physical therapist said that he had been doing better every week.  However today she noticed he was acutely worse.  Son reports patient had Dupont catheter removed last Wednesday " on 1/8.  He noted cloudy urine over the weekend.  He states patient has not been complaining of anything specific.  He reports he has not been eating as much the past few days.  Patient fatigued but answering questions appropriately.  Denies any complaints other than being cold.  Son confirmed patient is DNR/DNI.    Review of Systems   Constitutional:  Positive for appetite change and fatigue. Negative for chills and fever.   HENT:  Negative for ear pain and sore throat.    Eyes:  Negative for pain and visual disturbance.   Respiratory:  Negative for cough and shortness of breath.    Cardiovascular:  Negative for chest pain and palpitations.   Gastrointestinal:  Negative for abdominal pain and vomiting.   Genitourinary:  Negative for dysuria and hematuria.   Musculoskeletal:  Negative for arthralgias and back pain.   Skin:  Negative for color change and rash.   Neurological:  Positive for weakness. Negative for seizures and syncope.   All other systems reviewed and are negative.      Historical Information   Past Medical History:   Diagnosis Date    Atrial fibrillation (HCC)     BPH (benign prostatic hyperplasia)     CHF (congestive heart failure) (HCC)     Chronic kidney disease     Coronary artery disease     Diabetes mellitus (HCC)     Hyperlipidemia     Hypertension     Hyponatremia 12/07/2024     Past Surgical History:   Procedure Laterality Date    CARDIAC CATHETERIZATION N/A 6/30/2023    Procedure: Cardiac pericardiocentesis;  Surgeon: Shanna Adame DO;  Location: [facility];  Service: Cardiology    CARDIAC CATHETERIZATION N/A 6/30/2023    Procedure: Cardiac RHC;  Surgeon: Shanna Adame DO;  Location: [facility];  Service: Cardiology    EYE SURGERY      IR CHEST TUBE PLACEMENT  6/24/2023    IR CHEST TUBE PLACEMENT  7/28/2023    IR PLEURAL EFFUSION LONG-TERM CATHETER PLACEMENT  8/7/2023    IR PLEURAL EFFUSION LONG-TERM CATHETER REMOVAL  1/3/2024    IR THORACENTESIS  12/11/2023     SKIN CANCER EXCISION      TONSILLECTOMY       Social History     Tobacco Use    Smoking status: Never    Smokeless tobacco: Former    Tobacco comments:     Smoked a pipe 50 years ago   Vaping Use    Vaping status: Never Used   Substance and Sexual Activity    Alcohol use: Not Currently     Alcohol/week: 0.0 standard drinks of alcohol     Comment: special occasions    Drug use: Not Currently    Sexual activity: Not on file     E-Cigarette/Vaping    E-Cigarette Use Never User      E-Cigarette/Vaping Substances    Nicotine No     THC No     CBD No     Flavoring No     Other No     Unknown No      Family History   Problem Relation Age of Onset    Heart disease Mother     Diabetes Father     Vision loss Father     Cancer Sister     Diabetes Sister      Social History:  Marital Status:    Occupation:   Patient Pre-hospital Living Situation: Home, With other family member: son  Patient Pre-hospital Level of Mobility: unable to be assessed at time of evaluation  Patient Pre-hospital Diet Restrictions: diabetic, fluid/salt restriction    Meds/Allergies   I have reviewed home medications using recent Epic encounter.  Prior to Admission medications    Medication Sig Start Date End Date Taking? Authorizing Provider   allopurinol (ZYLOPRIM) 100 mg tablet Take 100 mg by mouth 2 (two) times a day    Historical Provider, MD   ALPRAZolam (XANAX) 0.5 mg tablet 0.5 mg daily at bedtime as needed for anxiety or sleep 3/12/18   Historical Provider, MD   apixaban (Eliquis) 2.5 mg Take 1 tablet (2.5 mg total) by mouth 2 (two) times a day 8/8/24   SUSANA Kaur   ascorbic acid (VITAMIN C) 500 MG tablet Take 1 tablet (500 mg total) by mouth daily 1/8/21   Anna Dahl MD   atorvastatin (LIPITOR) 40 mg tablet Take 1 tablet (40 mg total) by mouth daily with dinner 1/16/19   Phoebe Perez DO   Blood Pressure Monitor NILTON by Does not apply route daily 2/19/20   Fredi Guadarrama MD   cholecalciferol (VITAMIN D3) 1,000 units tablet  Take 2 tablets (2,000 Units total) by mouth daily Do not start before July 12, 2023. 7/12/23 12/30/24  Yousuf Pressley MD   docusate sodium (COLACE) 100 mg capsule Take 1 capsule (100 mg total) by mouth 2 (two) times a day as needed for constipation 8/10/23   Katrina Lobo PA-C   famotidine (PEPCID) 10 mg tablet Take 1 tablet (10 mg total) by mouth daily at bedtime 12/15/24   Artemio Pacheco DO   ferrous sulfate 325 (65 Fe) mg tablet Take 325 mg by mouth daily with breakfast    Historical Provider, MD   glimepiride (AMARYL) 4 mg tablet Take 4 mg by mouth daily with dinner 1/4/25   Historical Provider, MD   halobetasol (ULTRAVATE) 0.05 % cream  12/7/23   Historical Provider, MD   insulin aspart (NovoLOG FlexPen) 100 UNIT/ML injection pen  5/20/24   Historical Provider, MD   latanoprost (XALATAN) 0.005 % ophthalmic solution Administer 1 drop to both eyes daily at bedtime    Historical Provider, MD   Sure Comfort Pen Needles 32G X 4 MM MISC  5/30/24   Historical Provider, MD   tamsulosin (FLOMAX) 0.4 mg Take 0.4 mg by mouth daily with dinner    Historical Provider, MD   timolol (TIMOPTIC) 0.5 % ophthalmic solution Administer 1 drop to both eyes daily 1/7/21   Anna Dahl MD   torsemide (DEMADEX) 20 mg tablet Take 2 tablets (40 mg total) by mouth 2 (two) times a day 12/15/24   Artemio Pacheco DO   ZINC OXIDE PO Take by mouth daily    Historical Provider, MD     Allergies   Allergen Reactions    Elemental Sulfur     Sulfa Antibiotics        Objective :  Temp:  [92.1 °F (33.4 °C)-94.6 °F (34.8 °C)] 92.2 °F (33.4 °C)  HR:  [] 60  BP: (119-134)/(57-70) 134/70  Resp:  [22-36] 22  SpO2:  [98 %-100 %] 99 %  O2 Device: None (Room air)    Physical Exam  Vitals and nursing note reviewed.   Constitutional:       General: He is not in acute distress.     Appearance: He is well-developed.      Comments: Appears fatigued, though easily awakens and responds appropriately to questions  Ruben resendiz in place    Cardiovascular:      Rate and Rhythm: Normal rate and regular rhythm.   Pulmonary:      Effort: Pulmonary effort is normal. No respiratory distress.      Breath sounds: Decreased breath sounds present.   Abdominal:      Palpations: Abdomen is soft.      Tenderness: There is no abdominal tenderness.   Musculoskeletal:         General: No swelling.   Skin:     General: Skin is warm and dry.   Neurological:      Mental Status: He is alert.   Psychiatric:         Mood and Affect: Mood normal.          Lines/Drains:        Lab Results: I have reviewed the following results:  Results from last 7 days   Lab Units 01/14/25  1307   WBC Thousand/uL 4.12*   HEMOGLOBIN g/dL 10.6*   HEMATOCRIT % 32.8*   PLATELETS Thousands/uL 102*   SEGS PCT % 76*   LYMPHO PCT % 12*   MONO PCT % 11   EOS PCT % 0     Results from last 7 days   Lab Units 01/14/25  1307   SODIUM mmol/L 136   POTASSIUM mmol/L 5.1   CHLORIDE mmol/L 94*   CO2 mmol/L 31   BUN mg/dL 95*   CREATININE mg/dL 2.32*   ANION GAP mmol/L 11   CALCIUM mg/dL 8.2*   ALBUMIN g/dL 3.1*   TOTAL BILIRUBIN mg/dL 0.63   ALK PHOS U/L 147*   ALT U/L 24   AST U/L 19   GLUCOSE RANDOM mg/dL 209*     Results from last 7 days   Lab Units 01/14/25  1307   INR  2.26*         Lab Results   Component Value Date    HGBA1C 7.7 (H) 12/07/2024    HGBA1C 7.2 (H) 08/19/2024    HGBA1C 8.8 (H) 11/20/2023     Results from last 7 days   Lab Units 01/14/25  1520 01/14/25  1307   LACTIC ACID mmol/L 0.6  --    PROCALCITONIN ng/ml  --  0.28*       Imaging Results Review: I reviewed radiology reports from this admission including: chest xray.  Other Study Results Review: No additional pertinent studies reviewed.    Administrative Statements     ** Please Note: This note has been constructed using a voice recognition system. **

## 2025-01-14 NOTE — PLAN OF CARE
Problem: Prexisting or High Potential for Compromised Skin Integrity  Goal: Skin integrity is maintained or improved  Description: INTERVENTIONS:  - Identify patients at risk for skin breakdown  - Assess and monitor skin integrity  - Assess and monitor nutrition and hydration status  - Monitor labs   - Assess for incontinence   - Turn and reposition patient  - Assist with mobility/ambulation  - Relieve pressure over bony prominences  - Avoid friction and shearing  - Provide appropriate hygiene as needed including keeping skin clean and dry  - Evaluate need for skin moisturizer/barrier cream  - Collaborate with interdisciplinary team   - Patient/family teaching  - Consider wound care consult   1/14/2025 1832 by Helen Garrido RN  Outcome: Progressing  1/14/2025 1832 by Helen Garrido RN  Outcome: Progressing     Problem: SAFETY ADULT  Goal: Patient will remain free of falls  Description: INTERVENTIONS:  - Educate patient/family on patient safety including physical limitations  - Instruct patient to call for assistance with activity   - Consult OT/PT to assist with strengthening/mobility   - Keep Call bell within reach  - Keep bed low and locked with side rails adjusted as appropriate  - Keep care items and personal belongings within reach  - Initiate and maintain comfort rounds  - Make Fall Risk Sign visible to staff  - Offer Toileting every 1 Hour, in advance of need  - Initiate/Maintain bed alarm  - Obtain necessary fall risk management equipment: yellow nonskid socks  - Apply yellow socks and bracelet for high fall risk patients  - Consider moving patient to room near nurses station  Outcome: Progressing  Goal: Maintain or return to baseline ADL function  Description: INTERVENTIONS:  -  Assess patient's ability to carry out ADLs; assess patient's baseline for ADL function and identify physical deficits which impact ability to perform ADLs (bathing, care of mouth/teeth, toileting, grooming, dressing, etc.)  -  Assess/evaluate cause of self-care deficits   - Assess range of motion  - Assess patient's mobility; develop plan if impaired  - Assess patient's need for assistive devices and provide as appropriate  - Encourage maximum independence but intervene and supervise when necessary  - Involve family in performance of ADLs  - Assess for home care needs following discharge   - Consider OT consult to assist with ADL evaluation and planning for discharge  - Provide patient education as appropriate  Outcome: Progressing  Goal: Maintains/Returns to pre admission functional level  Description: INTERVENTIONS:  - Perform AM-PAC 6 Click Basic Mobility/ Daily Activity assessment daily.  - Set and communicate daily mobility goal to care team and patient/family/caregiver.   - Collaborate with rehabilitation services on mobility goals if consulted  - Perform Range of Motion daily  - Reposition patient every 2 hours.    Problem: DISCHARGE PLANNING  Goal: Discharge to home or other facility with appropriate resources  Description: INTERVENTIONS:  - Identify barriers to discharge w/patient and caregiver  - Arrange for needed discharge resources and transportation as appropriate  - Identify discharge learning needs (meds, wound care, etc.)  - Arrange for interpretive services to assist at discharge as needed  - Refer to Case Management Department for coordinating discharge planning if the patient needs post-hospital services based on physician/advanced practitioner order or complex needs related to functional status, cognitive ability, or social support system  Outcome: Progressing     Problem: Knowledge Deficit  Goal: Patient/family/caregiver demonstrates understanding of disease process, treatment plan, medications, and discharge instructions  Description: Complete learning assessment and assess knowledge base.  Interventions:  - Provide teaching at level of understanding  - Provide teaching via preferred learning methods  Outcome:  Progressing     Problem: GENITOURINARY - ADULT  Goal: Maintains or returns to baseline urinary function  Description: INTERVENTIONS:  - Assess urinary function  - Encourage oral fluids to ensure adequate hydration if ordered  - Administer IV fluids as ordered to ensure adequate hydration  - Administer ordered medications as needed  - Offer frequent toileting  - Follow urinary retention protocol if ordered  Outcome: Progressing  Goal: Absence of urinary retention  Description: INTERVENTIONS:  - Assess patient’s ability to void and empty bladder  - Monitor I/O  - Bladder scan as needed  - Discuss with physician/AP medications to alleviate retention as needed  - Discuss catheterization for long term situations as appropriate  Outcome: Progressing  Goal: Urinary catheter remains patent  Description: INTERVENTIONS:  - Assess patency of urinary catheter  - If patient has a chronic victor, consider changing catheter if non-functioning  - Follow guidelines for intermittent irrigation of non-functioning urinary catheter  Outcome: Progressing     Problem: MUSCULOSKELETAL - ADULT  Goal: Maintain or return mobility to safest level of function  Description: INTERVENTIONS:  - Assess patient's ability to carry out ADLs; assess patient's baseline for ADL function and identify physical deficits which impact ability to perform ADLs (bathing, care of mouth/teeth, toileting, grooming, dressing, etc.)  - Assess/evaluate cause of self-care deficits   - Assess range of motion  - Assess patient's mobility  - Assess patient's need for assistive devices and provide as appropriate  - Encourage maximum independence but intervene and supervise when necessary  - Involve family in performance of ADLs  - Assess for home care needs following discharge   - Consider OT consult to assist with ADL evaluation and planning for discharge  - Provide patient education as appropriate  Outcome: Progressing  Goal: Maintain proper alignment of affected body  part  Description: INTERVENTIONS:  - Support, maintain and protect limb and body alignment  - Provide patient/ family with appropriate education  Outcome: Progressing   - Record patient progress and toleration of activity level   Outcome: Progressing

## 2025-01-14 NOTE — ASSESSMENT & PLAN NOTE
Patient presented to ED with generalized weakness. Son reports patient saw PCP last week for ear pain/fatigue. Had recent victor removal on 01/08 in urology office.  Noted to be hypothermic, tachcyardic, and tachypneic on arrival to ED with abnormal UA  Likely in setting of UTI, continue treatment as below  PT/OT

## 2025-01-15 LAB
ANION GAP SERPL CALCULATED.3IONS-SCNC: 7 MMOL/L (ref 4–13)
BUN SERPL-MCNC: 91 MG/DL (ref 5–25)
CALCIUM SERPL-MCNC: 9 MG/DL (ref 8.4–10.2)
CHLORIDE SERPL-SCNC: 99 MMOL/L (ref 96–108)
CO2 SERPL-SCNC: 31 MMOL/L (ref 21–32)
CREAT SERPL-MCNC: 2.51 MG/DL (ref 0.6–1.3)
ERYTHROCYTE [DISTWIDTH] IN BLOOD BY AUTOMATED COUNT: 16.6 % (ref 11.6–15.1)
GFR SERPL CREATININE-BSD FRML MDRD: 22 ML/MIN/1.73SQ M
GLUCOSE P FAST SERPL-MCNC: 129 MG/DL (ref 65–99)
GLUCOSE SERPL-MCNC: 118 MG/DL (ref 65–140)
GLUCOSE SERPL-MCNC: 124 MG/DL (ref 65–140)
GLUCOSE SERPL-MCNC: 129 MG/DL (ref 65–140)
GLUCOSE SERPL-MCNC: 74 MG/DL (ref 65–140)
GLUCOSE SERPL-MCNC: 93 MG/DL (ref 65–140)
HCT VFR BLD AUTO: 30.6 % (ref 36.5–49.3)
HGB BLD-MCNC: 10.2 G/DL (ref 12–17)
MCH RBC QN AUTO: 31.8 PG (ref 26.8–34.3)
MCHC RBC AUTO-ENTMCNC: 33.3 G/DL (ref 31.4–37.4)
MCV RBC AUTO: 95 FL (ref 82–98)
PLATELET # BLD AUTO: 101 THOUSANDS/UL (ref 149–390)
PMV BLD AUTO: 9.5 FL (ref 8.9–12.7)
POTASSIUM SERPL-SCNC: 4.5 MMOL/L (ref 3.5–5.3)
PROCALCITONIN SERPL-MCNC: 0.25 NG/ML
RBC # BLD AUTO: 3.21 MILLION/UL (ref 3.88–5.62)
SODIUM SERPL-SCNC: 137 MMOL/L (ref 135–147)
WBC # BLD AUTO: 4.31 THOUSAND/UL (ref 4.31–10.16)

## 2025-01-15 PROCEDURE — 84145 PROCALCITONIN (PCT): CPT

## 2025-01-15 PROCEDURE — 97530 THERAPEUTIC ACTIVITIES: CPT

## 2025-01-15 PROCEDURE — 97163 PT EVAL HIGH COMPLEX 45 MIN: CPT

## 2025-01-15 PROCEDURE — 85027 COMPLETE CBC AUTOMATED: CPT

## 2025-01-15 PROCEDURE — 80048 BASIC METABOLIC PNL TOTAL CA: CPT

## 2025-01-15 PROCEDURE — 97167 OT EVAL HIGH COMPLEX 60 MIN: CPT

## 2025-01-15 PROCEDURE — 97535 SELF CARE MNGMENT TRAINING: CPT

## 2025-01-15 PROCEDURE — 99232 SBSQ HOSP IP/OBS MODERATE 35: CPT | Performed by: NURSE PRACTITIONER

## 2025-01-15 PROCEDURE — 82948 REAGENT STRIP/BLOOD GLUCOSE: CPT

## 2025-01-15 RX ORDER — INSULIN LISPRO 100 [IU]/ML
1-5 INJECTION, SOLUTION INTRAVENOUS; SUBCUTANEOUS
Status: DISCONTINUED | OUTPATIENT
Start: 2025-01-15 | End: 2025-01-18 | Stop reason: HOSPADM

## 2025-01-15 RX ORDER — NYSTATIN 100000 [USP'U]/G
POWDER TOPICAL 2 TIMES DAILY
Status: DISCONTINUED | OUTPATIENT
Start: 2025-01-15 | End: 2025-01-18 | Stop reason: HOSPADM

## 2025-01-15 RX ADMIN — ALLOPURINOL 100 MG: 100 TABLET ORAL at 08:44

## 2025-01-15 RX ADMIN — TIMOLOL MALEATE 1 DROP: 5 SOLUTION OPHTHALMIC at 08:45

## 2025-01-15 RX ADMIN — FERROUS SULFATE TAB 325 MG (65 MG ELEMENTAL FE) 325 MG: 325 (65 FE) TAB at 08:45

## 2025-01-15 RX ADMIN — TORSEMIDE 40 MG: 20 TABLET ORAL at 08:44

## 2025-01-15 RX ADMIN — ATORVASTATIN CALCIUM 40 MG: 40 TABLET, FILM COATED ORAL at 17:22

## 2025-01-15 RX ADMIN — NYSTATIN: 100000 POWDER TOPICAL at 18:26

## 2025-01-15 RX ADMIN — APIXABAN 2.5 MG: 2.5 TABLET, FILM COATED ORAL at 17:22

## 2025-01-15 RX ADMIN — ALLOPURINOL 100 MG: 100 TABLET ORAL at 17:22

## 2025-01-15 RX ADMIN — APIXABAN 2.5 MG: 2.5 TABLET, FILM COATED ORAL at 08:44

## 2025-01-15 RX ADMIN — CEFTRIAXONE 1000 MG: 1 INJECTION, SOLUTION INTRAVENOUS at 15:28

## 2025-01-15 RX ADMIN — TORSEMIDE 40 MG: 20 TABLET ORAL at 17:22

## 2025-01-15 RX ADMIN — TAMSULOSIN HYDROCHLORIDE 0.4 MG: 0.4 CAPSULE ORAL at 17:22

## 2025-01-15 RX ADMIN — FAMOTIDINE 10 MG: 20 TABLET, FILM COATED ORAL at 21:47

## 2025-01-15 NOTE — ASSESSMENT & PLAN NOTE
Continue flomax  Dupont inserted last admission for urinary retention, recently removed in urology office 01/08  Urinary retention protocol

## 2025-01-15 NOTE — ASSESSMENT & PLAN NOTE
Evidenced by hypothermia, tachypnea, tachycardia in setting of likely UTI given recent removal of victor catheter  CXR negative for source of infection  COVID/flu/RSV negative  Procalcitonin mildly elevated though nonspecific in setting of CKD  Lactate negative  UA positive for leukocytes, WBC, bacteria  Blood cultures pending  Urine culture pending  Continue rocephin  Patient initially hypothermic, now normothermic after application of Ruben hugger  Trend cbc and fever curve

## 2025-01-15 NOTE — PLAN OF CARE
Problem: Prexisting or High Potential for Compromised Skin Integrity  Goal: Skin integrity is maintained or improved  Description: INTERVENTIONS:  - Identify patients at risk for skin breakdown  - Assess and monitor skin integrity  - Assess and monitor nutrition and hydration status  - Monitor labs   - Assess for incontinence   - Turn and reposition patient  - Assist with mobility/ambulation  - Relieve pressure over bony prominences  - Avoid friction and shearing  - Provide appropriate hygiene as needed including keeping skin clean and dry  - Evaluate need for skin moisturizer/barrier cream  - Collaborate with interdisciplinary team   - Patient/family teaching  - Consider wound care consult   Outcome: Progressing     Problem: SAFETY ADULT  Goal: Maintain or return to baseline ADL function  Description: INTERVENTIONS:  -  Assess patient's ability to carry out ADLs; assess patient's baseline for ADL function and identify physical deficits which impact ability to perform ADLs (bathing, care of mouth/teeth, toileting, grooming, dressing, etc.)  - Assess/evaluate cause of self-care deficits   - Assess range of motion  - Assess patient's mobility; develop plan if impaired  - Assess patient's need for assistive devices and provide as appropriate  - Encourage maximum independence but intervene and supervise when necessary  - Involve family in performance of ADLs  - Assess for home care needs following discharge   - Consider OT consult to assist with ADL evaluation and planning for discharge  - Provide patient education as appropriate  Outcome: Progressing     Problem: Knowledge Deficit  Goal: Patient/family/caregiver demonstrates understanding of disease process, treatment plan, medications, and discharge instructions  Description: Complete learning assessment and assess knowledge base.  Interventions:  - Provide teaching at level of understanding  - Provide teaching via preferred learning methods  Outcome: Progressing      Problem: GENITOURINARY - ADULT  Goal: Absence of urinary retention  Description: INTERVENTIONS:  - Assess patient’s ability to void and empty bladder  - Monitor I/O  - Bladder scan as needed  - Discuss with physician/AP medications to alleviate retention as needed  - Discuss catheterization for long term situations as appropriate  Outcome: Progressing

## 2025-01-15 NOTE — OCCUPATIONAL THERAPY NOTE
Occupational Therapy Evaluation/Treatment       01/15/25 1053   OT Last Visit   OT Visit Date 01/15/25   Note Type   Note type Evaluation   Pain Assessment   Pain Assessment Tool 0-10   Pain Score No Pain   Home Living   Type of Home House   Home Layout One level   Bathroom Shower/Tub Tub/shower unit   Bathroom Toilet Standard   Bathroom Equipment Grab bars in shower;Grab bars around toilet   Home Equipment Cane   Additional Comments questionable historian at times   Prior Function   Level of [place] Independent with ADLs;Independent with functional mobility;Needs assistance with IADLS   Lives With Son;Other (Comment)  (and RHIANNA)   Receives Help From Family   IADLs Family/Friend/Other provides transportation;Family/Friend/Other provides meals;Family/Friend/Other provides medication management   Vocational Retired   General   Additional Pertinent History Pt presents with generalized weakness. Found to have hypothermia with concern for UTI   Family/Caregiver Present No   Subjective   Subjective Pt agreeable to OT evaluation   ADL   Eating Assistance 4  Minimal Assistance   Grooming Assistance 3  Moderate Assistance   UB Bathing Assistance 3  Moderate Assistance   LB Bathing Assistance 3  Moderate Assistance   UB Dressing Assistance 3  Moderate Assistance   LB Dressing Assistance 3  Moderate Assistance   Toileting Assistance  3  Moderate Assistance   Bed Mobility   Supine to Sit 4  Minimal assistance   Additional items Assist x 1;Verbal cues;Increased time required;HOB elevated   Sit to Supine 3  Moderate assistance   Additional items Assist x 1;Verbal cues;LE management;Increased time required   Transfers   Sit to Stand 3  Moderate assistance   Additional items Assist x 1;Verbal cues;Increased time required   Stand to Sit 3  Moderate assistance   Additional items Assist x 1;Verbal cues;Increased time required   Functional Mobility   Functional Mobility 3  Moderate assistance   Additional Comments engaged in fxl  mobility short household distances using RW with mod A, multimodal cueing for RW mgmt, safety awareness, sequencing, proper hand placement   Additional items Rolling walker   Balance   Static Sitting Fair +   Dynamic Sitting Fair   Static Standing Fair -   Dynamic Standing Poor +  (with RW)   Activity Tolerance   Activity Tolerance Patient limited by fatigue;Treatment limited secondary to medical complications (Comment)  (limited by weakness and deconditioning)   Medical Staff Made Aware spoke with PT Radha   Nurse Made Aware spoke with RN Yanira   RUE Assessment   RUE Assessment   (ROM WFL, MMT grossly 3+/5)   LUE Assessment   LUE Assessment   (ROM WFL, MMT grossly 3+/5)   Cognition   Overall Cognitive Status Impaired   Arousal/Participation Alert;Cooperative   Attention Attends with cues to redirect   Orientation Level Oriented to person;Oriented to place;Disoriented to time;Disoriented to situation   Memory Decreased short term memory;Decreased recall of recent events;Decreased recall of precautions   Following Commands Follows one step commands with increased time or repetition   Assessment   Limitation Decreased ADL status;Decreased UE strength;Decreased Safe judgement during ADL;Decreased cognition;Decreased endurance;Decreased self-care trans;Decreased high-level ADLs  (decreased balance and mobility)   Prognosis Fair   Assessment Patient evaluated by Occupational Therapy.  Patient admitted with Generalized weakness.  The patients occupational profile, medical and therapy history includes a extensive additional review of physical, cognitive, or psychosocial history related to current functional performance.  Comorbidities affecting functional mobility and ADLS include: CHF, CKD, diabetes, HTN, PAD, a-fib, sepsis, UTI.  Prior to admission, patient was independent with functional mobility with cane as needed, independent with ADLS, and requiring assist for IADLS.  The evaluation identifies the following  performance deficits: weakness, impaired balance, decreased endurance, increased fall risk, new onset of impairment of functional mobility, decreased ADLS, decreased IADLS, decreased activity tolerance, decreased safety awareness, impaired judgement, decreased cognition, and decreased strength, that result in activity limitations and/or participation restrictions. This evaluation requires clinical decision making of high complexity, because the patient presents with comorbidites that affect occupational performance and required significant modification of tasks or assistance with consideration of multiple treatment options.  The Barthel Index was used as a functional outcome tool presenting with a score of 35, indicating marked limitations of functional mobility and ADLS.  The patient's raw score on the -PAC Daily Activity Inpatient Short Form is 13. A raw score of less than 19 suggests the patient may benefit from discharge to post-acute rehabilitation services. Please refer to the recommendation of the Occupational Therapist for safe discharge planning.  Patient will benefit from skilled Occupational Therapy services to address above deficits and facilitate a safe return to prior level of function.   Goals   Patient Goals to go home   STG Time Frame   (1-7 days)   Short Term Goal  Goals established to promote Patient Goals: to go home:  Eating: supervision; Grooming: min assist standing at sink; Bathing: min assist; Upper Body Dressing min assist; Lower Body Dressing: min assist; Toileting: min assist; Patient will increase ambulatory standard toilet transfer to min assist with rolling walker to increase performance and safety with ADLS and functional mobility; Patient will increase standing tolerance to 5 minutes during ADL task to decrease assistance level and decrease fall risk; Patient will increase bed mobility to supervision in preparation for ADLS and transfers; Patient will increase functional mobility  to and from bathroom with rolling walker with min assist to increase performance with ADLS and to use a toilet; Patient will tolerate 8 minutes of UE ROM/strengthening to increase general activity tolerance and performance in ADLS/IADLS; Patient will improve functional activity tolerance to 8 minutes of sustained functional tasks to increase participation in basic self-care and decrease assistance level; Patient will increase dynamic sitting balance to fair+ to improve the ability to sit at edge of bed or on a chair for ADLS;  Patient will increase dynamic standing balance to fair- to improve postural stability and decrease fall risk during standing ADLS and transfers.   LTG Time Frame   (8-14 days)   Long Term Goal Eating: independent; Grooming: supervision standing at sink; Bathing: supervision; Upper Body Dressing supervision; Lower Body Dressing: supervision; Toileting: supervision; Patient will increase ambulatory standard toilet transfer to supervision with rolling walker to increase performance and safety with ADLS and functional mobility; Patient will increase standing tolerance to 10 minutes during ADL task to decrease assistance level and decrease fall risk; Patient will increase bed mobility to independent in preparation for ADLS and transfers; Patient will increase functional mobility to and from bathroom with rolling walker with supervision to increase performance with ADLS and to use a toilet; Patient will tolerate 15 minutes of UE ROM/strengthening to increase general activity tolerance and performance in ADLS/IADLS; Patient will improve functional activity tolerance to 15 minutes of sustained functional tasks to increase participation in basic self-care and decrease assistance level; Patient will increase dynamic sitting balance to good to improve the ability to sit at edge of bed or on a chair for ADLS;  Patient will increase dynamic standing balance to fair to improve postural stability and  decrease fall risk during standing ADLS and transfers.  Pt will score >/= 17/24 on AM-PAC Daily Activity Inpatient scale to promote safe independence with ADLs and functional mobility; Pt will score >/= 65/100 on Barthel Index in order to decrease caregiver assistance needed and increase ability to perform ADLs and functional mobility.   Plan   Treatment Interventions ADL retraining;Functional transfer training;UE strengthening/ROM;Endurance training;Patient/family training;Cognitive reorientation;Equipment evaluation/education;Activityengagement   Goal Expiration Date 01/29/25   OT Frequency 3-5x/wk   Discharge Recommendation   Rehab Resource Intensity Level, OT II (Moderate Resource Intensity)   AM-PAC Daily Activity Inpatient   Lower Body Dressing 2   Bathing 2   Toileting 2   Upper Body Dressing 2   Grooming 2   Eating 3   Daily Activity Raw Score 13   Daily Activity Standardized Score (Calc for Raw Score >=11) 32.03   AM-[facility] Applied Cognition Inpatient   Following a Speech/Presentation 2   Understanding Ordinary Conversation 3   Taking Medications 2   Remembering Where Things Are Placed or Put Away 3   Remembering List of 4-5 Errands 2   Taking Care of Complicated Tasks 2   Applied Cognition Raw Score 14   Applied Cognition Standardized Score 32.02   Barthel Index   Feeding 5   Bathing 0   Grooming Score 0   Dressing Score 5   Bladder Score 5   Bowels Score 10   Toilet Use Score 5   Transfers (Bed/Chair) Score 5   Mobility (Level Surface) Score 0   Stairs Score 0   Barthel Index Score 35   Additional Treatment Session   Start Time 1042   End Time 1053   Treatment Assessment Pt completed standard toilet transfer using RW with mod A. Required mod A for clothing mgmt and hygiene. Additional fxl mobility using RW with mod A with pt benefiting from verbal cues for RW management, sequencing, and safe hand placement. Pt with delayed processing at times requiring multimodal cueing for task completion. Pt generally weak  and deconditioned impacting fxl performance. Pt with fair tolerance for OT treatment session. Pt would benefit from cont OT services while in hospital to maximize safety and fxl performance of ADLs. At this time recommend level II moderate resource intensity when medically cleared for d/c.     Alyson Gomes OTR/L   [place] License # [id number]  [place] License # [id number]

## 2025-01-15 NOTE — ASSESSMENT & PLAN NOTE
Lab Results   Component Value Date    HGBA1C 7.7 (H) 12/07/2024       Recent Labs     01/14/25  1940 01/15/25  0706 01/15/25  1104   POCGLU 219* 118 93       Blood Sugar Average: Last 72 hrs:  (P) [id number] home glimepiride  SSI  Diabetic diet

## 2025-01-15 NOTE — PROGRESS NOTES
Progress Note - Hospitalist   Name: Yao Tipton 84 y.o. male I MRN: [id number]  Unit/Bed#: 2 [facility] 219 A I Date of Admission: 1/14/2025   Date of Service: 1/15/2025 I Hospital Day: 0    Assessment & Plan  Generalized weakness  Patient presented to ED with generalized weakness. Son reports patient saw PCP last week for ear pain/fatigue. Had recent victor removal on 01/08 in urology office.  Noted to be hypothermic, tachcyardic, and tachypneic on arrival to ED with abnormal UA  Likely in setting of UTI, continue treatment as below  Suspect urinary tract infection is likely due to Victor catheter  PT/OT eval and treatment, follow-up on recommendations  Sepsis (HCC)  Evidenced by hypothermia, tachypnea, tachycardia in setting of likely UTI given recent removal of victor catheter  CXR negative for source of infection  COVID/flu/RSV negative  Procalcitonin mildly elevated though nonspecific in setting of CKD  Lactate negative  UA positive for leukocytes, WBC, bacteria  Blood cultures pending  Urine culture pending  Continue rocephin  Patient initially hypothermic, now normothermic after application of Ruben hugger  Trend cbc and fever curve  Urinary tract infection  Victor recently removed 01/08  UA positive for leukocytes, WBC, bacteria  Prior urine cultures grew proteus susceptible to cefazolin  Continue IV rocephin for now  Urine culture pending  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 Pulmonary vascular congestion.  , lower than previous values  Weight stable  Appears euvolemic  Son states patient had weight gain last week and was instructed by  his PCP to take torsemide 60 mg twice daily x 3 days which he completed yesterday  Continue home torsemide 40 mg bid  Monitor Is/Os and daily weights  Salt and fluid restriction  Essential hypertension  Continue home torsemide 40 mg bid  Diabetes mellitus with peripheral artery disease (HCC)  Lab Results   Component Value Date    HGBA1C 7.7 (H) 12/07/2024       Recent Labs     01/14/25  1940 01/15/25  0706 01/15/25  1104   POCGLU 219* 118 93       Blood Sugar Average: Last 72 hrs:  (P) [id number] home glimepiride  SSI  Diabetic diet  Chronic kidney disease, stage 4 (severe) (HCC)  Lab Results   Component Value Date    EGFR 22 01/15/2025    EGFR 24 01/14/2025    EGFR 26 12/15/2024    CREATININE 2.51 (H) 01/15/2025    CREATININE 2.32 (H) 01/14/2025    CREATININE 2.21 (H) 12/15/2024   Follows with Dr. Guadarrama outpatient  Baseline Cr 2.5-3.0  Currently stable at baseline  Daily bmp  Chronic atrial fibrillation (HCC)  Continue eliquis 2.5 mg bid  Not on AV donnie blocker due to bradycardia  BPH (benign prostatic hyperplasia)  Continue flomax  Dupont inserted last admission for urinary retention, recently removed in urology office 01/08  Urinary retention protocol    VTE Pharmacologic Prophylaxis: VTE Score: 5 High Risk (Score >/= 5) - Pharmacological DVT Prophylaxis Ordered: apixaban (Eliquis). Sequential Compression Devices Ordered.    Mobility:   Basic Mobility Inpatient Raw Score: 16  JH-HLM Goal: 5: Stand one or more mins  JH-HLM Achieved: 2: Bed activities/Dependent transfer  JH-HLM Goal NOT achieved. Continue with multidisciplinary rounding and encourage appropriate mobility to improve upon JH-HLM goals.    Patient Centered Rounds: I performed bedside rounds with nursing staff today.   Discussions with Specialists or Other Care Team Provider: Multidisciplinary team    Education and Discussions with Family / Patient: Attempted to update  (son) via phone. Left voicemail.     Current Length of  "Stay: 0 day(s)  Current Patient Status: Inpatient   Certification Statement: The patient will continue to require additional inpatient hospital stay due to IV abx, follow up urine and blood cultures repeat labs in am   Discharge Plan: Anticipate discharge in 48 hrs to discharge location to be determined pending rehab evaluations.    Code Status: Level 3 - DNAR and DNI    Subjective   \"I want to get up and move\".  Patient reports he slept okay, denies any fevers or chills.  States he is not really hungry for breakfast this morning.  Denies any nausea or vomiting.  Denies any pain or burning with urination.    Objective :  Temp:  [92.2 °F (33.4 °C)-99.8 °F (37.7 °C)] 99 °F (37.2 °C)  HR:  [60-77] 77  BP: (106-134)/(52-70) 111/54  Resp:  [19-22] 19  SpO2:  [97 %-100 %] 98 %  O2 Device: None (Room air)    Body mass index is 23.43 kg/m².     Input and Output Summary (last 24 hours):     Intake/Output Summary (Last 24 hours) at 1/15/2025 1319  Last data filed at 1/15/2025 0843  Gross per 24 hour   Intake 150 ml   Output 25 ml   Net 125 ml       Physical Exam  Vitals and nursing note reviewed.   Constitutional:       Comments: Chronically ill-appearing elderly gentleman resting comfortably in bed.   HENT:      Head: Normocephalic.      Nose: Nose normal.      Mouth/Throat:      Mouth: Mucous membranes are moist.   Eyes:      Extraocular Movements: Extraocular movements intact.      Conjunctiva/sclera: Conjunctivae normal.   Cardiovascular:      Rate and Rhythm: Normal rate.      Heart sounds: Murmur heard.   Pulmonary:      Effort: Pulmonary effort is normal.      Breath sounds: Normal breath sounds.   Abdominal:      General: Bowel sounds are normal. There is no distension.      Palpations: Abdomen is soft.      Tenderness: There is no abdominal tenderness.   Genitourinary:     Comments: Voiding spontaneously  Musculoskeletal:      Cervical back: Normal range of motion.      Right lower leg: No edema.      Left lower leg: " No edema.      Comments: Generalized deconditioning   Skin:     Capillary Refill: Capillary refill takes less than 2 seconds.      Comments: Lower extremity dressing present, no strikethrough drainage noted.  Left lower extremity scabbed area noted with some erythremia noted   Neurological:      General: No focal deficit present.      Mental Status: He is oriented to person, place, and time.   Psychiatric:         Mood and Affect: Mood normal.         Behavior: Behavior normal.           Lines/Drains:    Urinary Catheter:  Goal for removal: No Dupont present                 Lab Results: I have reviewed the following results:   Results from last 7 days   Lab Units 01/15/25  0520 01/14/25  1307   WBC Thousand/uL 4.31 4.12*   HEMOGLOBIN g/dL 10.2* 10.6*   HEMATOCRIT % 30.6* 32.8*   PLATELETS Thousands/uL 101* 102*   SEGS PCT %  --  76*   LYMPHO PCT %  --  12*   MONO PCT %  --  11   EOS PCT %  --  0     Results from last 7 days   Lab Units 01/15/25  0520 01/14/25  1307   SODIUM mmol/L 137 136   POTASSIUM mmol/L 4.5 5.1   CHLORIDE mmol/L 99 94*   CO2 mmol/L 31 31   BUN mg/dL 91* 95*   CREATININE mg/dL 2.51* 2.32*   ANION GAP mmol/L 7 11   CALCIUM mg/dL 9.0 8.2*   ALBUMIN g/dL  --  3.1*   TOTAL BILIRUBIN mg/dL  --  0.63   ALK PHOS U/L  --  147*   ALT U/L  --  24   AST U/L  --  19   GLUCOSE RANDOM mg/dL 129 209*     Results from last 7 days   Lab Units 01/14/25  1307   INR  2.26*     Results from last 7 days   Lab Units 01/15/25  1104 01/15/25  0706 01/14/25  1940   POC GLUCOSE mg/dl 93 118 219*         Results from last 7 days   Lab Units 01/15/25  0520 01/14/25  1520 01/14/25  1307   LACTIC ACID mmol/L  --  0.6  --    PROCALCITONIN ng/ml 0.25  --  0.28*       Recent Cultures (last 7 days):   Results from last 7 days   Lab Units 01/14/25  1525 01/14/25  1520   BLOOD CULTURE  Received in Microbiology Lab. Culture in Progress. Received in Microbiology Lab. Culture in Progress.       Imaging Results Review: I reviewed  radiology reports from this admission including: chest xray.  Other Study Results Review: No additional pertinent studies reviewed.    Last 24 Hours Medication List:     Current Facility-Administered Medications:     acetaminophen (TYLENOL) tablet 650 mg, Q6H PRN    allopurinol (ZYLOPRIM) tablet 100 mg, BID    apixaban (ELIQUIS) tablet 2.5 mg, BID    atorvastatin (LIPITOR) tablet 40 mg, Daily With Dinner    cefTRIAXone (ROCEPHIN) IVPB (premix in dextrose) 1,000 mg 50 mL, Q24H    famotidine (PEPCID) tablet 10 mg, HS    ferrous sulfate tablet 325 mg, Daily With Breakfast    insulin lispro (HumALOG/ADMELOG) 100 units/mL subcutaneous injection 1-5 Units, 4x Daily (AC & HS) **AND** Fingerstick Glucose (POCT), 4x Daily AC and at bedtime    tamsulosin (FLOMAX) capsule 0.4 mg, Daily With Dinner    timolol (TIMOPTIC) 0.5 % ophthalmic solution 1 drop, Daily    torsemide (DEMADEX) tablet 40 mg, BID    Administrative Statements   Today, Patient Was Seen By: SUSANA Sheldon  I have spent a total time of rater than 45 minutes in caring for this patient on the day of the visit/encounter including Diagnostic results, Counseling / Coordination of care, Documenting in the medical record, Reviewing / ordering tests, medicine, procedures  , Obtaining or reviewing history  , and Communicating with other healthcare professionals .    **Please Note: This note may have been constructed using a voice recognition system.**

## 2025-01-15 NOTE — ASSESSMENT & PLAN NOTE
Lab Results   Component Value Date    EGFR 22 01/15/2025    EGFR 24 01/14/2025    EGFR 26 12/15/2024    CREATININE 2.51 (H) 01/15/2025    CREATININE 2.32 (H) 01/14/2025    CREATININE 2.21 (H) 12/15/2024   Follows with Dr. Guadarrama outpatient  Baseline Cr 2.5-3.0  Currently stable at baseline  Daily bmp

## 2025-01-15 NOTE — PHYSICAL THERAPY NOTE
"       PHYSICAL THERAPY EVALUATION/TREATMENT     01/15/25 1530   PT Last Visit   PT Visit Date 01/15/25   Note Type   Note type Evaluation   Pain Assessment   Pain Assessment Tool 0-10   Pain Score No Pain   Restrictions/Precautions   Weight Bearing Precautions Per Order No   Other Precautions Cognitive;Chair Alarm;Bed Alarm;Fall Risk   Home Living   Type of Home House   Home Layout One level   Home Equipment Cane   Additional Comments Pt is lethargic: information obtained from OT documentation.  Pt is questionable historian, and lethargic.   Prior Function   Level of [place] Independent with ADLs;Independent with functional mobility;Needs assistance with IADLS   Lives With Son  (and his sister in law)   Receives Help From Family   IADLs Family/Friend/Other provides transportation;Family/Friend/Other provides meals;Family/Friend/Other provides medication management   General   Additional Pertinent History Pt admitted with generalized weakness, hypothermia and UTI   Family/Caregiver Present No   Cognition   Overall Cognitive Status Impaired   Arousal/Participation Lethargic   Attention Attends with cues to redirect   Orientation Level Oriented to person;Oriented to place   Following Commands Follows one step commands inconsistently   Comments Pt was lethargic : stated his first name the half of his last name and fell asleep in the middle of stating his name.  Pt became more alert with ROM , then able to participate.   Subjective   Subjective \"how long am I going to be here\"  Pt knows he is [facility].   RLE Assessment   RLE Assessment WFL  (ROM WFLs, ankle to neutral;  Stength: grossly 3 - to 3/5)   LLE Assessment   LLE Assessment WFL  (ROM WFLs, ankle to neutral; Stength: grossly 3 - to 3/5)   Bed Mobility   Supine to Sit 4  Minimal assistance   Additional items Assist x 1;Verbal cues;Increased time required   Sit to Supine 3  Moderate assistance   Additional items Assist x 1;Increased time required;Verbal " cues;LE management   Additional Comments assist to get legs back onto bed   Transfers   Sit to Stand 3  Moderate assistance  (to RW)   Additional items Assist x 1;Verbal cues   Stand to Sit 3  Moderate assistance   Additional items Assist x 1;Verbal cues   Ambulation/Elevation   Gait pattern   (very shakey and unsteady with just standing.  Did not venture away from side of bed.)   Gait Assistance 3  Moderate assist   Additional items Assist x 1;Verbal cues;Tactile cues   Assistive Device Rolling walker   Distance 4-5 steps sidestepping at side of bed   Stair Management Assistance Not tested   Balance   Static Sitting Fair   Static Standing Poor +   Dynamic Standing Poor   Ambulatory Poor -  (with RW)   Activity Tolerance   Activity Tolerance Patient limited by fatigue  (pt limited by significant weakness and generalized deconditioning.)   Medical Staff Made Aware spoke to Tala TRACY   Nurse Made Aware spoke to ROBERTO See   Assessment   Prognosis Fair   Problem List Decreased strength;Decreased endurance;Impaired balance;Decreased mobility;Decreased cognition;Impaired judgement;Decreased safety awareness;Decreased skin integrity  (Pt has wounds on bilateral lower legs that are in bandages)   Assessment Patient seen for Physical Therapy evaluation. Patient admitted with Generalized weakness.  Comorbidities affecting patient's physical performance include: CHF, CKD, DM, HTN.  Personal factors affecting patient at time of initial evaluation include: lives in [facility], ambulating with assistive device, stairs to enter home, inability to ambulate household distances, inability to navigate community distances, inability to navigate level surfaces without external assistance, decreased cognition, limited home support, inability to perform physical activity, limited insight into impairments, inability to perform ADLS, inability to perform IADLS , and inability to live alone. Prior to admission, patient was  independent with functional mobility with rolling walker, requiring assist for ADLS, requiring assist for IADLS, living with son in a single level home with 4 steps to enter, ambulating household distance, and home with family assist.  Please find objective findings from Physical Therapy assessment regarding body systems outlined above with impairments and limitations including weakness, impaired balance, decreased endurance, gait deviations, decreased activity tolerance, decreased functional mobility tolerance, decreased safety awareness, impaired judgement, fall risk, decreased skin integrity, and decreased cognition.  The Barthel Index was used as a functional outcome tool presenting with a score of Barthel Index Score: 35 today indicating marked limitations of functional mobility and ADLS.  Patient's clinical presentation is currently unstable/unpredictable as seen in patient's presentation of vital sign response, varying levels of cognitive performance, increased fall risk, new onset of impairment of functional mobility, decreased endurance, and new onset of weakness. Pt would benefit from continued Physical Therapy treatment to address deficits as defined above and maximize level of functional mobility. As demonstrated by objective findings, the assigned level of complexity for this evaluation is high.The patient's -[facility] Basic Mobility Inpatient Short Form Raw Score is 14. A Raw score of less than or equal to 16 suggests the patient may benefit from discharge to post-acute rehabilitation services. Please also refer to the recommendation of the Physical Therapist for safe discharge planning.   Goals   Patient Goals to go home   STG Expiration Date 01/22/25   Short Term Goal #1 Indep transfers supine <> short sit without use of bedrails and with HOB flat;  Indep sit <> stand with use of RW and with at least fair balance   Short Term Goal #2 Mod A of 1 for amb. with RW with at least fair balance.   LTG  Expiration Date 01/29/25   Long Term Goal #1 Min A/ supervision for amb. with RW and with good balance.   Long Term Goal #2 [facility]: at least 20 / 24 to demonstrate improvment with functional mobility   Plan   Treatment/Interventions ADL retraining;Functional transfer training;LE strengthening/ROM;Therapeutic exercise;Endurance training;Cognitive reorientation;Patient/family training;Equipment eval/education;Bed mobility;Gait training;Spoke to nursing;OT   PT Frequency 3-5x/wk   Discharge Recommendation   Rehab Resource Intensity Level, PT II (Moderate Resource Intensity)   Additional Comments Pt is unable to care for himself and is very deconditioned and weak.   AM-PAC Basic Mobility Inpatient   Turning in Flat Bed Without Bedrails 3   Lying on Back to Sitting on Edge of Flat Bed Without Bedrails 3   Moving Bed to Chair 3   Standing Up From Chair Using Arms 2   Walk in Room 2   Climb 3-5 Stairs With Railing 1   Basic Mobility Inpatient Raw Score 14   Basic Mobility Standardized Score 35.55   [facility] Highest Level Of Mobility   [facility] Goal 4: Move to chair/commode   [facility] Achieved 5: Stand (1 or more minutes)   Barthel Index   Feeding 5   Bathing 0   Grooming Score 0   Dressing Score 5   Bladder Score 5   Bowels Score 10   Toilet Use Score 5   Transfers (Bed/Chair) Score 5   Mobility (Level Surface) Score 0   Stairs Score 0   Barthel Index Score 35   Additional Treatment Session   Start Time 1520   End Time 1530   Treatment Assessment Pt was initially very lethargic , but once PT began to move pt's UEs , pt became more alert.  Pt moves very slowly.  Pt is very weak and shakey when standing at RW.  Very unsteady on his feet, retro lean.  LOB with side stepping.  Will continue to progress toward goals.   Equipment Use RW   End of Consult   Patient Position at End of Consult Supine;Bed/Chair alarm activated;All needs within reach   Licensure   [place] License Number  Maryjane Boyer PT  [id number]

## 2025-01-15 NOTE — ASSESSMENT & PLAN NOTE
Patient presented to ED with generalized weakness. Son reports patient saw PCP last week for ear pain/fatigue. Had recent victor removal on 01/08 in urology office.  Noted to be hypothermic, tachcyardic, and tachypneic on arrival to ED with abnormal UA  Likely in setting of UTI, continue treatment as below  Suspect urinary tract infection is likely due to Victor catheter  PT/OT eval and treatment, follow-up on recommendations

## 2025-01-15 NOTE — PHYSICAL THERAPY NOTE
PT cancellation     01/15/25 1425   PT Last Visit   PT Visit Date 01/15/25   Note Type   Note type Cancelled Session   Additional Comments Attempted to see pt for PT evaluation,  pt is very lethargic and cannot keep his eyes open to even answer questions.  Will try later as schedule allows.   Licensure   [place] License Number  Maryjane Boyer PT  [id number]

## 2025-01-16 LAB
ANION GAP SERPL CALCULATED.3IONS-SCNC: 4 MMOL/L (ref 4–13)
ANION GAP SERPL CALCULATED.3IONS-SCNC: 6 MMOL/L (ref 4–13)
BACTERIA UR CULT: ABNORMAL
BUN SERPL-MCNC: 87 MG/DL (ref 5–25)
BUN SERPL-MCNC: 91 MG/DL (ref 5–25)
CALCIUM SERPL-MCNC: 7.9 MG/DL (ref 8.4–10.2)
CALCIUM SERPL-MCNC: 8.3 MG/DL (ref 8.4–10.2)
CHLORIDE SERPL-SCNC: 100 MMOL/L (ref 96–108)
CHLORIDE SERPL-SCNC: 102 MMOL/L (ref 96–108)
CO2 SERPL-SCNC: 32 MMOL/L (ref 21–32)
CO2 SERPL-SCNC: 32 MMOL/L (ref 21–32)
CREAT SERPL-MCNC: 2.73 MG/DL (ref 0.6–1.3)
CREAT SERPL-MCNC: 2.75 MG/DL (ref 0.6–1.3)
ERYTHROCYTE [DISTWIDTH] IN BLOOD BY AUTOMATED COUNT: 17.3 % (ref 11.6–15.1)
GFR SERPL CREATININE-BSD FRML MDRD: 19 ML/MIN/1.73SQ M
GFR SERPL CREATININE-BSD FRML MDRD: 20 ML/MIN/1.73SQ M
GLUCOSE SERPL-MCNC: 128 MG/DL (ref 65–140)
GLUCOSE SERPL-MCNC: 155 MG/DL (ref 65–140)
GLUCOSE SERPL-MCNC: 156 MG/DL (ref 65–140)
GLUCOSE SERPL-MCNC: 180 MG/DL (ref 65–140)
GLUCOSE SERPL-MCNC: 226 MG/DL (ref 65–140)
GLUCOSE SERPL-MCNC: 31 MG/DL (ref 65–140)
GLUCOSE SERPL-MCNC: 32 MG/DL (ref 65–140)
GLUCOSE SERPL-MCNC: 34 MG/DL (ref 65–140)
HCT VFR BLD AUTO: 28.3 % (ref 36.5–49.3)
HGB BLD-MCNC: 9.2 G/DL (ref 12–17)
MCH RBC QN AUTO: 31.5 PG (ref 26.8–34.3)
MCHC RBC AUTO-ENTMCNC: 32.5 G/DL (ref 31.4–37.4)
MCV RBC AUTO: 97 FL (ref 82–98)
PLATELET # BLD AUTO: 87 THOUSANDS/UL (ref 149–390)
PMV BLD AUTO: 9.7 FL (ref 8.9–12.7)
POTASSIUM SERPL-SCNC: 4 MMOL/L (ref 3.5–5.3)
POTASSIUM SERPL-SCNC: 4 MMOL/L (ref 3.5–5.3)
RBC # BLD AUTO: 2.92 MILLION/UL (ref 3.88–5.62)
SODIUM SERPL-SCNC: 136 MMOL/L (ref 135–147)
SODIUM SERPL-SCNC: 140 MMOL/L (ref 135–147)
WBC # BLD AUTO: 3.58 THOUSAND/UL (ref 4.31–10.16)

## 2025-01-16 PROCEDURE — 99232 SBSQ HOSP IP/OBS MODERATE 35: CPT | Performed by: NURSE PRACTITIONER

## 2025-01-16 PROCEDURE — 82948 REAGENT STRIP/BLOOD GLUCOSE: CPT

## 2025-01-16 PROCEDURE — 85027 COMPLETE CBC AUTOMATED: CPT | Performed by: NURSE PRACTITIONER

## 2025-01-16 PROCEDURE — 80048 BASIC METABOLIC PNL TOTAL CA: CPT | Performed by: NURSE PRACTITIONER

## 2025-01-16 RX ORDER — DEXTROSE MONOHYDRATE 25 G/50ML
INJECTION, SOLUTION INTRAVENOUS
Status: COMPLETED
Start: 2025-01-16 | End: 2025-01-16

## 2025-01-16 RX ORDER — DEXTROSE MONOHYDRATE 50 MG/ML
50 INJECTION, SOLUTION INTRAVENOUS CONTINUOUS
Status: DISPENSED | OUTPATIENT
Start: 2025-01-16 | End: 2025-01-16

## 2025-01-16 RX ORDER — SODIUM HYPOCHLORITE 2.5 MG/ML
1 SOLUTION TOPICAL DAILY
Status: DISCONTINUED | OUTPATIENT
Start: 2025-01-17 | End: 2025-01-18 | Stop reason: HOSPADM

## 2025-01-16 RX ORDER — DEXTROSE MONOHYDRATE 25 G/50ML
25 INJECTION, SOLUTION INTRAVENOUS ONCE
Status: COMPLETED | OUTPATIENT
Start: 2025-01-16 | End: 2025-01-16

## 2025-01-16 RX ADMIN — INSULIN LISPRO 2 UNITS: 100 INJECTION, SOLUTION INTRAVENOUS; SUBCUTANEOUS at 12:02

## 2025-01-16 RX ADMIN — ALLOPURINOL 100 MG: 100 TABLET ORAL at 17:33

## 2025-01-16 RX ADMIN — FAMOTIDINE 10 MG: 20 TABLET, FILM COATED ORAL at 21:17

## 2025-01-16 RX ADMIN — FERROUS SULFATE TAB 325 MG (65 MG ELEMENTAL FE) 325 MG: 325 (65 FE) TAB at 09:11

## 2025-01-16 RX ADMIN — DEXTROSE MONOHYDRATE 25 G: 25 INJECTION, SOLUTION INTRAVENOUS at 07:25

## 2025-01-16 RX ADMIN — TORSEMIDE 40 MG: 20 TABLET ORAL at 09:13

## 2025-01-16 RX ADMIN — ATORVASTATIN CALCIUM 40 MG: 40 TABLET, FILM COATED ORAL at 17:34

## 2025-01-16 RX ADMIN — APIXABAN 2.5 MG: 2.5 TABLET, FILM COATED ORAL at 09:11

## 2025-01-16 RX ADMIN — CEFTRIAXONE 1000 MG: 1 INJECTION, SOLUTION INTRAVENOUS at 14:48

## 2025-01-16 RX ADMIN — INSULIN LISPRO 1 UNITS: 100 INJECTION, SOLUTION INTRAVENOUS; SUBCUTANEOUS at 21:18

## 2025-01-16 RX ADMIN — TORSEMIDE 40 MG: 20 TABLET ORAL at 17:37

## 2025-01-16 RX ADMIN — INSULIN LISPRO 1 UNITS: 100 INJECTION, SOLUTION INTRAVENOUS; SUBCUTANEOUS at 17:33

## 2025-01-16 RX ADMIN — ALLOPURINOL 100 MG: 100 TABLET ORAL at 09:11

## 2025-01-16 RX ADMIN — NYSTATIN: 100000 POWDER TOPICAL at 09:10

## 2025-01-16 RX ADMIN — DEXTROSE 50 ML/HR: 5 SOLUTION INTRAVENOUS at 07:42

## 2025-01-16 RX ADMIN — TIMOLOL MALEATE 1 DROP: 5 SOLUTION OPHTHALMIC at 09:12

## 2025-01-16 RX ADMIN — NYSTATIN: 100000 POWDER TOPICAL at 17:37

## 2025-01-16 RX ADMIN — APIXABAN 2.5 MG: 2.5 TABLET, FILM COATED ORAL at 17:34

## 2025-01-16 RX ADMIN — TAMSULOSIN HYDROCHLORIDE 0.4 MG: 0.4 CAPSULE ORAL at 17:34

## 2025-01-16 NOTE — PROGRESS NOTES
Progress Note - Hospitalist   Name: Yao Tipton 84 y.o. male I MRN: [id number]  Unit/Bed#: 2 [facility] 219 A I Date of Admission: 1/14/2025   Date of Service: 1/16/2025 I Hospital Day: 1    Assessment & Plan  Generalized weakness  Patient presented to ED with generalized weakness. Son reports patient saw PCP last week for ear pain/fatigue. Had recent victor removal on 01/08 in urology office.  Noted to be hypothermic, tachcyardic, and tachypneic on arrival to ED with abnormal UA  Likely in setting of UTI, continue treatment as below  Suspect urinary tract infection is likely due to Victor catheter  PT/OT eval and treatment, recommending STR at this time   Sepsis (HCC)  Evidenced by hypothermia, tachypnea, tachycardia in setting of likely UTI given recent removal of victor catheter  CXR negative for source of infection  COVID/flu/RSV negative  Procalcitonin mildly elevated though nonspecific in setting of CKD  Lactate negative  UA positive for leukocytes, WBC, bacteria  Blood cultures pending  Urine culture preliminary showing proteus; follow up on final culture and sensitivity  Continue rocephin at this time   Patient initially hypothermic, now normothermic after application of Ruben hugger  Trend cbc and fever curve  Urinary tract infection  Victor recently removed 01/08  UA positive for leukocytes, WBC, bacteria  Prior urine cultures grew proteus susceptible to cefazolin  Continue IV rocephin for now  Urine culture preliminary proteus; follow up on final culture and sensitivity  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 Pulmonary vascular congestion.  BNP  193, lower than previous values  Weight stable  Appears euvolemic  Son states patient had weight gain last week and was instructed by his PCP to take torsemide 60 mg twice daily x 3 days which he completed day prior to admission   Continue home torsemide 40 mg bid  Monitor Is/Os and daily weights  Salt and fluid restriction  Essential hypertension  Continue home torsemide 40 mg bid  Diabetes mellitus with peripheral artery disease (HCC)  Lab Results   Component Value Date    HGBA1C 7.7 (H) 12/07/2024       Recent Labs     01/15/25  2051 01/16/25  0719 01/16/25  0721 01/16/25  0803   POCGLU 124 34* 32* 128       Blood Sugar Average: Last 72 hrs:  (P) 102.75hold home glimepiride  SSI  Diabetic diet, encourage oral intake as patient has poor appetite; added magic cup supplements with meals  Chronic kidney disease, stage 4 (severe) (HCC)  Lab Results   Component Value Date    EGFR 20 01/16/2025    EGFR 22 01/15/2025    EGFR 24 01/14/2025    CREATININE 2.73 (H) 01/16/2025    CREATININE 2.51 (H) 01/15/2025    CREATININE 2.32 (H) 01/14/2025   Follows with Dr. Guadarrama outpatient  Baseline Cr 2.5-3.0  Currently stable at baseline  Daily bmp  Chronic atrial fibrillation (HCC)  Continue eliquis 2.5 mg bid  Not on AV donnie blocker due to bradycardia  BPH (benign prostatic hyperplasia)  Continue flomax  Dupont inserted last admission for urinary retention, recently removed in urology office 01/08  Urinary retention protocol    VTE Pharmacologic Prophylaxis: VTE Score: 5 High Risk (Score >/= 5) - Pharmacological DVT Prophylaxis Ordered: apixaban (Eliquis). Sequential Compression Devices Ordered.    Mobility:   Basic Mobility Inpatient Raw Score: 14  JH-HLM Goal: 4: Move to chair/commode  JH-HLM Achieved: 6: Walk 10 steps or more  JH-HLM Goal achieved. Continue to encourage appropriate mobility.    Patient Centered Rounds: I performed bedside rounds with nursing staff today.   Discussions with Specialists or Other Care Team Provider:  multidisciplinary team     Education and Discussions with Family / Patient: Updated  (son) via phone.    Current Length of Stay: 1 day(s)  Current Patient Status: Inpatient   Certification Statement: The patient will continue to require additional inpatient hospital stay due to IV abx, PT and OT, repeat labs, follow up final culture   Discharge Plan: Anticipate discharge in 24-48 hrs to discharge location to be determined pending rehab evaluations.    Code Status: Level 3 - DNAR and DNI    Subjective   Patient seen sitting up in bed, reports that he feels tired this morning.  He is asking if he will be able to go home, we did discuss how he is still being treated for a urinary tract infection we are waiting for final cultures.  Did encourage patient to make sure that he eats as his blood sugar was low this morning.  Patient reported that he will try, denies any nausea or vomiting.  Reports his appetite is normally not that great.  We talked about doing supplements which she is agreeable to.    Objective :  Temp:  [97.2 °F (36.2 °C)-97.9 °F (36.6 °C)] 97.2 °F (36.2 °C)  HR:  [64-76] 73  BP: (105-117)/(56-68) 110/67  Resp:  [16-24] 24  SpO2:  [98 %-99 %] 99 %  O2 Device: None (Room air)    Body mass index is 23.43 kg/m².     Input and Output Summary (last 24 hours):     Intake/Output Summary (Last 24 hours) at 1/16/2025 1025  Last data filed at 1/15/2025 1104  Gross per 24 hour   Intake --   Output 150 ml   Net -150 ml       Physical Exam  Vitals and nursing note reviewed.   Constitutional:       Comments: Chronically ill appearing gentleman   HENT:      Head: Normocephalic.      Nose: Nose normal.      Mouth/Throat:      Mouth: Mucous membranes are moist.   Eyes:      Extraocular Movements: Extraocular movements intact.      Conjunctiva/sclera: Conjunctivae normal.   Cardiovascular:      Pulses: Normal pulses.   Pulmonary:      Effort: Pulmonary effort is normal.      Breath sounds: No wheezing.    Abdominal:      General: Bowel sounds are normal. There is no distension.      Palpations: Abdomen is soft.      Tenderness: There is no abdominal tenderness.   Genitourinary:     Comments: Voiding spontaneously occasionally incontinent  Musculoskeletal:      Cervical back: Normal range of motion.      Comments: Deconditioning noted    Skin:     General: Skin is warm.      Capillary Refill: Capillary refill takes less than 2 seconds.      Comments: Right LE dressing present, no strike through drainage noted. Left LE has scabbed area with erythema noted, improved from previous exam.    Neurological:      General: No focal deficit present.      Mental Status: He is alert.      Comments: Not sure of date, knew he was in the hospital and who the president was    Psychiatric:         Mood and Affect: Mood normal.         Behavior: Behavior normal.           Lines/Drains:              Lab Results: I have reviewed the following results:   Results from last 7 days   Lab Units 01/16/25  0544 01/15/25  0520 01/14/25  1307   WBC Thousand/uL 3.58*   < > 4.12*   HEMOGLOBIN g/dL 9.2*   < > 10.6*   HEMATOCRIT % 28.3*   < > 32.8*   PLATELETS Thousands/uL 87*   < > 102*   SEGS PCT %  --   --  76*   LYMPHO PCT %  --   --  12*   MONO PCT %  --   --  11   EOS PCT %  --   --  0    < > = values in this interval not displayed.     Results from last 7 days   Lab Units 01/16/25  0544 01/15/25  0520 01/14/25  1307   SODIUM mmol/L 140   < > 136   POTASSIUM mmol/L 4.0   < > 5.1   CHLORIDE mmol/L 102   < > 94*   CO2 mmol/L 32   < > 31   BUN mg/dL 91*   < > 95*   CREATININE mg/dL 2.73*   < > 2.32*   ANION GAP mmol/L 6   < > 11   CALCIUM mg/dL 8.3*   < > 8.2*   ALBUMIN g/dL  --   --  3.1*   TOTAL BILIRUBIN mg/dL  --   --  0.63   ALK PHOS U/L  --   --  147*   ALT U/L  --   --  24   AST U/L  --   --  19   GLUCOSE RANDOM mg/dL 31*   < > 209*    < > = values in this interval not displayed.     Results from last 7 days   Lab Units 01/14/25  1307    INR  2.26*     Results from last 7 days   Lab Units 01/16/25  0803 01/16/25  0721 01/16/25  0719 01/15/25  2051 01/15/25  1542 01/15/25  1104 01/15/25  0706 01/14/25  1940   POC GLUCOSE mg/dl 128 32* 34* 124 74 93 118 219*         Results from last 7 days   Lab Units 01/15/25  0520 01/14/25  1520 01/14/25  1307   LACTIC ACID mmol/L  --  0.6  --    PROCALCITONIN ng/ml 0.25  --  0.28*       Recent Cultures (last 7 days):   Results from last 7 days   Lab Units 01/14/25  1525 01/14/25  1522 01/14/25  1520   BLOOD CULTURE  No Growth at 24 hrs.  --  No Growth at 24 hrs.   URINE CULTURE   --  >100,000 cfu/ml Proteus species*  --        Imaging Results Review: No pertinent imaging studies reviewed.  Other Study Results Review: No additional pertinent studies reviewed.    Last 24 Hours Medication List:     Current Facility-Administered Medications:     acetaminophen (TYLENOL) tablet 650 mg, Q6H PRN    allopurinol (ZYLOPRIM) tablet 100 mg, BID    apixaban (ELIQUIS) tablet 2.5 mg, BID    atorvastatin (LIPITOR) tablet 40 mg, Daily With Dinner    cefTRIAXone (ROCEPHIN) IVPB (premix in dextrose) 1,000 mg 50 mL, Q24H, Last Rate: 1,000 mg (01/15/25 1528)    dextrose 5 % infusion, Continuous, Last Rate: 50 mL/hr (01/16/25 0742)    famotidine (PEPCID) tablet 10 mg, HS    ferrous sulfate tablet 325 mg, Daily With Breakfast    insulin lispro (HumALOG/ADMELOG) 100 units/mL subcutaneous injection 1-5 Units, 4x Daily (AC & HS) **AND** Fingerstick Glucose (POCT), 4x Daily AC and at bedtime    nystatin (MYCOSTATIN) powder, BID    tamsulosin (FLOMAX) capsule 0.4 mg, Daily With Dinner    timolol (TIMOPTIC) 0.5 % ophthalmic solution 1 drop, Daily    torsemide (DEMADEX) tablet 40 mg, BID    Administrative Statements   Today, Patient Was Seen By: SUSANA Sheldon  I have spent a total time of greater than 45 minutes in caring for this patient on the day of the visit/encounter including Diagnostic results, Patient and family  education, Counseling / Coordination of care, Documenting in the medical record, Reviewing / ordering tests, medicine, procedures  , and Communicating with other healthcare professionals .  **Please Note: This note may have been constructed using a voice recognition system.**

## 2025-01-16 NOTE — ASSESSMENT & PLAN NOTE
Dupont recently removed 01/08  UA positive for leukocytes, WBC, bacteria  Prior urine cultures grew proteus susceptible to cefazolin  Continue IV rocephin for now  Urine culture preliminary proteus; follow up on final culture and sensitivity

## 2025-01-16 NOTE — DISCHARGE INSTR - OTHER ORDERS
Wound Care Plan:  1-Cleanse wound to right anterior lower leg with wound cleanser & pat dry. Apply Prevent barrier cream to periwound for protection then Dakin's soaked gauze x 5 minutes - rinse with NSS & pat dry. Apply Adaptic to wound cut to size of wound then Melgisorb Ag (calcium alginate with silver) or equivalent to wound cut to size of wound. Cover with ABD, rolled gauze and tape. Change 3x/week and as needed for soilage/dislodgement.  2-Cleanse wound to left medial lower leg with wound cleanser & pat dry. Apply betadine to wound (not on intact skin), ABD, rolled gauze and tape. Change 3x/week and as needed for soilage/dislodgement.  3-Cleanse wound to left ear and bridge of nose with mild soap and water & pat dry. Apply 3M No Sting daily  4-Apply Prevent barrier cream or equivalent to bilateral sacrum, buttock and heels 2x/day and as needed.  5-Heel lift boots to be worn to bilateral heels at all times in bed and after transfer to reclining chair if able. If patient unable to tolerate, must float heels on 2 pillows to offload pressure so heels are not in contact with mattress or pillows.  6-Use pressure redistribution cushion in chair when out of bed. Avoid prolonged sitting.  7-Moisturize skin daily with skin nourishing cream.  8-Turn/reposition every 2 hrs in bed for pressure re-distribution on skin.   9-Patient may follow up at [facility]. Call Central Scheduling for appointment: [phone].

## 2025-01-16 NOTE — ASSESSMENT & PLAN NOTE
Evidenced by hypothermia, tachypnea, tachycardia in setting of likely UTI given recent removal of victor catheter  CXR negative for source of infection  COVID/flu/RSV negative  Procalcitonin mildly elevated though nonspecific in setting of CKD  Lactate negative  UA positive for leukocytes, WBC, bacteria  Blood cultures pending  Urine culture preliminary showing proteus; follow up on final culture and sensitivity  Continue rocephin at this time   Patient initially hypothermic, now normothermic after application of Ruben hugger  Trend cbc and fever curve

## 2025-01-16 NOTE — CASE MANAGEMENT
Case Management Assessment & Discharge Planning Note    Patient name Yao Tipton  Location 2 [address] [facility]/2 [address] 219 A MRN [id number]  : 1940 Date 2025       Current Admission Date: 2025  Current Admission Diagnosis:Generalized weakness   Patient Active Problem List    Diagnosis Date Noted Date Diagnosed    Urinary tract infection 2025     Generalized weakness 2025     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 ([facility]) 2024     Hospital discharge follow-up 2024     Macrocytic anemia 2024     Gross hematuria 12/10/2024     Open wound of both lower extremities 2024     Unable to care for self 2024     Gout 2024     Left ankle pain 2023     Chronic combined systolic and diastolic CHF (congestive heart failure) ([facility]) 2023     Dyslipidemia 2023     BPH (benign prostatic hyperplasia) 2023     Cellulitis of left lower extremity 2023     Constipation 08/10/2023     Pleural effusion exudative 2023     Sepsis ([facility]) 2023     Goals of care, counseling/discussion 2023     Duodenal ulcer 07/10/2023     Encephalopathy 2023     Recent empyema of lung with persistent locuted R hydropneumothorax 2023     Hypoglycemia 2023     Thrombocytopenia ([facility]) 2023     Diarrhea 2023     Hypothermia 2023     Septic shock ([facility]) 2023     Acute urinary retention 2023     Macrocytosis 2023     Chronic kidney disease-mineral and bone disorder 2023     Advanced care planning/counseling discussion 2023     Benign hypertension with CKD (chronic kidney disease) stage IV ([facility]) 2022     Persistent proteinuria 2022     COVID-19 2022     Electrolyte abnormality 2022     Cellulitis of left upper extremity 2021     Chronic atrial fibrillation ([facility]) 2021     Vitamin D deficiency 10/18/2019     Chronic kidney  disease, stage 4 (severe) ([facility]) 04/05/2019     Acute on chronic combined systolic and diastolic congestive heart failure ([facility]) 01/29/2019     Recent pericardial effusion 01/13/2019     Leg edema 01/10/2019     Diabetes mellitus with peripheral artery disease ([facility]) 01/10/2019     PVC (premature ventricular contraction) 12/19/2018     Essential hypertension 12/19/2018     Closed traumatic displaced fracture of distal end of left radius with malunion 02/09/2018       LOS (days): 1  Geometric Mean LOS (GMLOS) (days): 4.8  Days to GMLOS:3.4     OBJECTIVE:  PATIENT READMITTED TO HOSPITAL  Risk of Unplanned Readmission Score: 33.7         Current admission status: Inpatient       Preferred Pharmacy:   [id number] - [place], [place] - [address]  [facility]  [address] [address]  Phone: [phone] Fax: [phone]    Primary Care Provider: Nicho Baltazar DO    Primary Insurance: MEDICARE  Secondary Insurance: BLUE CROSS    ASSESSMENT:  Active Health Care Proxies       González Tipton Health Care Agent - Son   Primary Phone: [phone] (Mobile)                           Readmission Root Cause  30 Day Readmission: Yes  During your hospital stay, did someone (provider, nurse, ) explain your care to you in a way you could understand?: Yes  Did you feel medically stable to leave the hospital?: Yes  Were you able to pay for your medication at the pharmacy?: Yes  Did you have reliable transportation to take you to your appointments?: Yes  During previous admission, was a post-acute recommendation made?: Yes  What post-acute resources were offered?: [facility]  Patient was readmitted due to: Weakness  Action Plan: Medical Management    Patient Information  Admitted from:: Other (comment)  Mental Status: Alert  During Assessment patient was accompanied by: Not accompanied during assessment  Assessment information provided by:: Son (Patient's Son González)  Primary Caregiver:  Family  Caregiver's Name:: Panchito Claudio  Caregiver's Relationship to Patient:: Family Member  Caregiver's Telephone Number:: [phone]  Support Systems: Self, Son  County of Residence: [place]  What city do you live in?: [place]  Home entry access options. Select all that apply.: No steps to enter home  Type of Current Residence: 2 story home  Upon entering residence, is there a bedroom on the main floor (no further steps)?: Yes  Upon entering residence, is there a bathroom on the main floor (no further steps)?: Yes  Living Arrangements: Lives w/ Son, Lives w/ Extended Family    Activities of Daily Living Prior to Admission  Functional Status: Independent  Completes ADLs independently?: Yes  Ambulates independently?: Yes (Son stated patient uses walker to ambulate.)  Does patient use assisted devices?: Yes  Assisted Devices (DME) used: Walker  Does patient currently own DME?: Yes  What DME does the patient currently own?: Walker, Wheelchair  Does the patient have a history of Short-Term Rehab?: Yes ([facility])  Does patient have a history of HHC?: Yes  Does patient currently have HHC?: Yes    Current Home Health Care  Type of Current Home Care Services: Nurse visit  Home Health Agency Name:: [facility]  Current Home Health Follow-Up Provider:: PCP    Patient Information Continued  Income Source: Pension/USP  Does patient have prescription coverage?: Yes  Does patient receive dialysis treatments?: No         Means of Transportation  Means of Transport to Appts:: Family transport      Social Determinants of Health (SDOH)      Flowsheet Row Most Recent Value   Housing Stability    In the last 12 months, was there a time when you were not able to pay the mortgage or rent on time? N  [Patient's son González answered questions.]   In the past 12 months, how many times have you moved where you were living? 0   At any time in the past 12 months, were you homeless or living in a shelter (including  now)? N   Transportation Needs    In the past 12 months, has lack of transportation kept you from medical appointments or from getting medications? no   In the past 12 months, has lack of transportation kept you from meetings, work, or from getting things needed for daily living? No   Food Insecurity    Within the past 12 months, you worried that your food would run out before you got the money to buy more. Never true   Within the past 12 months, the food you bought just didn't last and you didn't have money to get more. Never true   Utilities    In the past 12 months has the electric, gas, oil, or water company threatened to shut off services in your home? No            DISCHARGE DETAILS:    Discharge planning discussed with:: Patient's Son González  [place] of Choice: Yes  Comments - Freedom of Choice: CM called patient's son González to introduce self/role, complete assessement and discuss rehab recommendations. González stated he/patient are undecided between STR or home services and will make decision tomorrow. González consented to have referral sent for both.  CM contacted family/caregiver?: Yes  Were Treatment Team discharge recommendations reviewed with patient/caregiver?: Yes  Did patient/caregiver verbalize understanding of patient care needs?: Yes       Contacts  Patient Contacts: González Tipton (son)  Relationship to Patient:: Family  Contact Method: Phone  Phone Number: [phone]  Reason/Outcome: Discharge Planning, Continuity of Care, Emergency Contact    Requested Home Health Care         [facility]         Other Referral/Resources/Interventions Provided:  Referral Comments: CM sent blanket referrals in [facility] for STR and [facility]AL referral sent to [facility] for SN, PT and OT.         Treatment Team Recommendation: Home with Home Health Care, Short Term Rehab

## 2025-01-16 NOTE — ASSESSMENT & PLAN NOTE
Lab Results   Component Value Date    EGFR 20 01/16/2025    EGFR 22 01/15/2025    EGFR 24 01/14/2025    CREATININE 2.73 (H) 01/16/2025    CREATININE 2.51 (H) 01/15/2025    CREATININE 2.32 (H) 01/14/2025   Follows with Dr. Guadarrama outpatient  Baseline Cr 2.5-3.0  Currently stable at baseline  Daily bmp

## 2025-01-16 NOTE — ASSESSMENT & PLAN NOTE
Lab Results   Component Value Date    HGBA1C 7.7 (H) 12/07/2024       Recent Labs     01/15/25  2051 01/16/25  0719 01/16/25 0721 01/16/25  0803   POCGLU 124 34* 32* 128       Blood Sugar Average: Last 72 hrs:  (P) 102.75hold home glimepiride  SSI  Diabetic diet, encourage oral intake as patient has poor appetite; added magic cup supplements with meals

## 2025-01-16 NOTE — WOUND OSTOMY CARE
Progress Note - Wound   Yao Tipton 84 y.o. male MRN: [id number]  Unit/Bed#: 2 [place] [address] A Encounter: [id number]        Assessment:   This is an 84 year old male patient admitted on 1/14/25 with generalized weakness. Patient was extremely lethargic and agreeable to have wound visit done. He has episodes of urinary and fecal incontinence and was given incontinence care during wound visit. He was turned and repositioned with assist of 2 persons.    Assessment Findings:  1-Full thickness wound to right anterior leg with red granulation tissue, yellow slough and green, purulent, milky drainage noted. Provider notified and orders in place for wound care. Please see below for detailed assessments.  2-Full thickness wound to left medial lower leg with black eschar, edema and intact blister to periwound. Orders in place for wound care.  3-Stage 3 pressure injury POA to bridge of nose with pink granulation tissue and yellow slough. Orders in place for wound care.  4-Full thickness wound to left outer ear with dry tan & black eschar. Wound is suspicious considering patient's history of skin cancer excisions. Provider notified and orders in place for wound care.  5-Bilateral sacrobuttocks and heels intact - orders in place for skin care and for prevention.  6-Intact fungal rash to right groin due to leakage of urine - orders in place for skin care. Notified Primary RN that male Purewick device may help to alleviate discomfort from rash.    Wound Care Plan:  1-Cleanse wound to right anterior lower leg with NSS & pat dry. Apply Prevent barrier cream to periwound for protection then Dakin's soaked gauze x 5 minutes - rinse with NSS & pat dry. Apply Adaptic to wound cut to size of wound then Melgisorb Ag (calcium alginate with silver) to wound cut to size of wound. Cover with ABD, rolled gauze and tape. Change daily & prn soilage/dislodgement.  2-Cleanse wound to left medial lower leg with NSS & pat dry. Apply betadine to wound  (not on intact skin), ABD, rolled gauze and tape. Change every other day & prn soilage/dislodgement.  3-Cleanse wound to left ear and bridge of nose with NSS & pat dry. Apply 3M No Sting daily  4-Apply Prevent barrier cream to bilateral sacrum, buttock and heels BID and PRN  5-Heel lift boots to be worn to bilateral heels at all times in bed and after transfer to reclining chair if able. If patient unable to tolerate, must float heels on 2 pillows to offload pressure so heels are not in contact with mattress or pillows.  6-Use pressure redistribution cushion in chair when out of bed. Avoid prolonged sitting.  7-Moisturize skin daily with skin nourishing cream.  8-Turn/reposition q2h or when medically stable for pressure re-distribution on skin.     Wound 12/08/24 Pressure Injury Buttocks Right;Upper (Active)   Wound Description Intact;Epithelialization 01/16/25 1011   Wound Length (cm) 0 cm 01/16/25 1011   Wound Width (cm) 0 cm 01/16/25 1011   Wound Depth (cm) 0 cm 01/16/25 1011   Wound Surface Area (cm^2) 0 cm^2 01/16/25 1011   Wound Volume (cm^3) 0 cm^3 01/16/25 1011   Calculated Wound Volume (cm^3) 0 cm^3 01/16/25 1011   Change in Wound Size % 100 01/16/25 1011   Drainage Amount None 01/16/25 1011   Treatments Cleansed 01/16/25 1011   Dressing Protective barrier 01/16/25 1011   Dressing Changed New 01/16/25 1011   Dressing Status Intact 01/16/25 1011       Wound 12/08/24 Pressure Injury Buttocks Left (Active)   Wound Image   01/16/25 1011   Wound Description Intact;Epithelialization 01/16/25 1011   Wound Length (cm) 0 cm 01/16/25 1011   Wound Width (cm) 0 cm 01/16/25 1011   Wound Depth (cm) 0 cm 01/16/25 1011   Wound Surface Area (cm^2) 0 cm^2 01/16/25 1011   Wound Volume (cm^3) 0 cm^3 01/16/25 1011   Calculated Wound Volume (cm^3) 0 cm^3 01/16/25 1011   Change in Wound Size % 100 01/16/25 1011   Drainage Amount None 01/16/25 1011   Treatments Cleansed 01/16/25 1011   Dressing Protective barrier 01/16/25 1011    Dressing Changed New 01/16/25 1011   Dressing Status Intact 01/16/25 1011       Wound 12/08/24 Leg Right;Anterior;Distal (Active)   Wound Image   01/16/25 0946   Wound Description Granulation tissue;Beefy red;Slough;Yellow 01/16/25 0946   Ramandeep-wound Assessment Intact;Edema 01/16/25 0946   Wound Length (cm) 2.7 cm 01/16/25 0946   Wound Width (cm) 2.5 cm 01/16/25 0946   Wound Depth (cm) 0.1 cm 01/16/25 0946   Wound Surface Area (cm^2) 6.75 cm^2 01/16/25 0946   Wound Volume (cm^3) 0.675 cm^3 01/16/25 0946   Calculated Wound Volume (cm^3) 0.68 cm^3 01/16/25 0946   Change in Wound Size % -580 01/16/25 0946   Drainage Amount Moderate 01/16/25 0946   Drainage Description Green;Serosanguineous;Milky;  Purulent 01/16/25 0946   Non-staged Wound Description Full thickness 01/16/25 0946   Treatments Cleansed 01/16/25 0946   Dressing Calcium Alginate with Silver;ABD;Dry dressing;Adaptic 01/16/25 0946   Dressing Changed New 01/16/25 0946   Dressing Status Clean;Dry;Intact 01/16/25 0946       Wound 12/08/24 Leg Left;Medial;Distal (Active)   Wound Image  01/16/25 0957   Wound Description Dry;Black;Eschar 01/16/25 0948   Ramandeep-wound Assessment Intact blister;Edema;Erythema 01/16/25 0948   Wound Length (cm) 0.8 cm 01/16/25 0948   Wound Width (cm) 0.8 cm 01/16/25 0948   Wound Depth (cm) 0.1 cm 01/16/25 0948   Wound Surface Area (cm^2) 0.64 cm^2 01/16/25 0948   Wound Volume (cm^3) 0.064 cm^3 01/16/25 0948   Calculated Wound Volume (cm^3) 0.06 cm^3 01/16/25 0948   Change in Wound Size % 97.66 01/16/25 0948   Drainage Amount None 01/16/25 0948   Treatments Cleansed 01/16/25 0948   Dressing Betadine;ABD;Dry dressing 01/16/25 0948   Dressing Changed New 01/16/25 0948   Dressing Status Clean;Intact;Dry 01/16/25 0948       Wound 12/09/24 Pressure Injury Nose (Active)   Wound Image   01/16/25 0951   Wound Description Granulation tissue;Pink;Slough;Yellow 01/16/25 0951   Pressure Injury Stage Stage 3 01/16/25 0951   Ramandeep-wound Assessment  Intact 01/16/25 0951   Wound Length (cm) 0.2 cm 01/16/25 0951   Wound Width (cm) 0.2 cm 01/16/25 0951   Wound Depth (cm) 0.1 cm 01/16/25 0951   Wound Surface Area (cm^2) 0.04 cm^2 01/16/25 0951   Wound Volume (cm^3) 0.004 cm^3 01/16/25 0951   Calculated Wound Volume (cm^3) 0 cm^3 01/16/25 0951   Change in Wound Size % 100 01/16/25 0951   Drainage Amount None 01/16/25 0951   Treatments Cleansed 01/16/25 0951   Dressing 3M No Sting 01/16/25 0951   Dressing Changed New 01/16/25 0951   Dressing Status Intact 01/16/25 0951       Wound 01/16/25 Ear Left;Outer;Lateral (Active)   Wound Image   01/16/25 0953   Wound Description Dry;Black;Tan;Eschar 01/16/25 0953   Ramandeep-wound Assessment Intact 01/16/25 0953   Wound Length (cm) 1 cm 01/16/25 0953   Wound Width (cm) 0.4 cm 01/16/25 0953   Wound Depth (cm) 0.1 cm 01/16/25 0953   Wound Surface Area (cm^2) 0.4 cm^2 01/16/25 0953   Wound Volume (cm^3) 0.04 cm^3 01/16/25 0953   Calculated Wound Volume (cm^3) 0.04 cm^3 01/16/25 0953   Drainage Amount None 01/16/25 0953   Non-staged Wound Description Full thickness 01/16/25 0953   Treatments Cleansed 01/16/25 0953   Dressing 3M No Sting 01/16/25 0953   Dressing Changed New 01/16/25 0953   Dressing Status Intact 01/16/25 0953     Discussed assessment findings, and plan of care/recommendations with Abi MEZA via Secure Chat and with Brenda MEJIA.    Wound care will follow along with patient throughout admission, please call or text with questions and concerns.    Recommendations written as orders.  Kandace Tello MSN, RN, CWON

## 2025-01-16 NOTE — ASSESSMENT & PLAN NOTE
Patient presented to ED with generalized weakness. Son reports patient saw PCP last week for ear pain/fatigue. Had recent victor removal on 01/08 in urology office.  Noted to be hypothermic, tachcyardic, and tachypneic on arrival to ED with abnormal UA  Likely in setting of UTI, continue treatment as below  Suspect urinary tract infection is likely due to Victor catheter  PT/OT eval and treatment, recommending STR at this time

## 2025-01-16 NOTE — ASSESSMENT & PLAN NOTE
Wt Readings from Last 3 Encounters:   01/14/25 69.9 kg (154 lb 1.6 oz)   01/06/25 70.3 kg (155 lb)   12/30/24 69.9 kg (154 lb)     ECHO 12/2024: EF 45%, systolic function mildly reduced, diastolic function abnormal  CXR: Layering small bilateral pleural effusions with probable adjacent parenchymal atelectasis, similar to prior study. Pulmonary vascular congestion.  , lower than previous values  Weight stable  Appears euvolemic  Son states patient had weight gain last week and was instructed by his PCP to take torsemide 60 mg twice daily x 3 days which he completed day prior to admission   Continue home torsemide 40 mg bid  Monitor Is/Os and daily weights  Salt and fluid restriction

## 2025-01-17 LAB
ANION GAP SERPL CALCULATED.3IONS-SCNC: 8 MMOL/L (ref 4–13)
BUN SERPL-MCNC: 89 MG/DL (ref 5–25)
CALCIUM SERPL-MCNC: 7.6 MG/DL (ref 8.4–10.2)
CHLORIDE SERPL-SCNC: 100 MMOL/L (ref 96–108)
CO2 SERPL-SCNC: 32 MMOL/L (ref 21–32)
CORTIS AM PEAK SERPL-MCNC: 21.4 UG/DL (ref 6.7–22.6)
CREAT SERPL-MCNC: 2.68 MG/DL (ref 0.6–1.3)
ERYTHROCYTE [DISTWIDTH] IN BLOOD BY AUTOMATED COUNT: 17.2 % (ref 11.6–15.1)
GFR SERPL CREATININE-BSD FRML MDRD: 20 ML/MIN/1.73SQ M
GLUCOSE SERPL-MCNC: 150 MG/DL (ref 65–140)
GLUCOSE SERPL-MCNC: 228 MG/DL (ref 65–140)
GLUCOSE SERPL-MCNC: 327 MG/DL (ref 65–140)
GLUCOSE SERPL-MCNC: 33 MG/DL (ref 65–140)
GLUCOSE SERPL-MCNC: 389 MG/DL (ref 65–140)
GLUCOSE SERPL-MCNC: 42 MG/DL (ref 65–140)
HCT VFR BLD AUTO: 30.3 % (ref 36.5–49.3)
HGB BLD-MCNC: 9.8 G/DL (ref 12–17)
MCH RBC QN AUTO: 31.6 PG (ref 26.8–34.3)
MCHC RBC AUTO-ENTMCNC: 32.3 G/DL (ref 31.4–37.4)
MCV RBC AUTO: 98 FL (ref 82–98)
PLATELET # BLD AUTO: 81 THOUSANDS/UL (ref 149–390)
PMV BLD AUTO: 10.1 FL (ref 8.9–12.7)
POTASSIUM SERPL-SCNC: 4.4 MMOL/L (ref 3.5–5.3)
RBC # BLD AUTO: 3.1 MILLION/UL (ref 3.88–5.62)
SODIUM SERPL-SCNC: 140 MMOL/L (ref 135–147)
WBC # BLD AUTO: 4.89 THOUSAND/UL (ref 4.31–10.16)

## 2025-01-17 PROCEDURE — 82533 TOTAL CORTISOL: CPT

## 2025-01-17 PROCEDURE — 80048 BASIC METABOLIC PNL TOTAL CA: CPT | Performed by: NURSE PRACTITIONER

## 2025-01-17 PROCEDURE — 99232 SBSQ HOSP IP/OBS MODERATE 35: CPT

## 2025-01-17 PROCEDURE — 82948 REAGENT STRIP/BLOOD GLUCOSE: CPT

## 2025-01-17 PROCEDURE — 85027 COMPLETE CBC AUTOMATED: CPT | Performed by: NURSE PRACTITIONER

## 2025-01-17 RX ORDER — DEXTROSE MONOHYDRATE 25 G/50ML
INJECTION, SOLUTION INTRAVENOUS
Status: COMPLETED
Start: 2025-01-17 | End: 2025-01-17

## 2025-01-17 RX ORDER — DEXTROSE MONOHYDRATE 25 G/50ML
25 INJECTION, SOLUTION INTRAVENOUS ONCE
Status: COMPLETED | OUTPATIENT
Start: 2025-01-17 | End: 2025-01-17

## 2025-01-17 RX ADMIN — INSULIN LISPRO 1 UNITS: 100 INJECTION, SOLUTION INTRAVENOUS; SUBCUTANEOUS at 08:31

## 2025-01-17 RX ADMIN — DEXTROSE MONOHYDRATE 25 ML: 25 INJECTION, SOLUTION INTRAVENOUS at 07:32

## 2025-01-17 RX ADMIN — NYSTATIN: 100000 POWDER TOPICAL at 09:07

## 2025-01-17 RX ADMIN — FERROUS SULFATE TAB 325 MG (65 MG ELEMENTAL FE) 325 MG: 325 (65 FE) TAB at 08:41

## 2025-01-17 RX ADMIN — APIXABAN 2.5 MG: 2.5 TABLET, FILM COATED ORAL at 08:42

## 2025-01-17 RX ADMIN — INSULIN LISPRO 3 UNITS: 100 INJECTION, SOLUTION INTRAVENOUS; SUBCUTANEOUS at 11:56

## 2025-01-17 RX ADMIN — HYOSCYAMINE SULFATE 1 APPLICATION: 16 SOLUTION at 05:51

## 2025-01-17 RX ADMIN — ALLOPURINOL 100 MG: 100 TABLET ORAL at 08:41

## 2025-01-17 RX ADMIN — CEFTRIAXONE 1000 MG: 1 INJECTION, SOLUTION INTRAVENOUS at 14:16

## 2025-01-17 RX ADMIN — INSULIN LISPRO 4 UNITS: 100 INJECTION, SOLUTION INTRAVENOUS; SUBCUTANEOUS at 22:05

## 2025-01-17 RX ADMIN — ALLOPURINOL 100 MG: 100 TABLET ORAL at 17:06

## 2025-01-17 RX ADMIN — TAMSULOSIN HYDROCHLORIDE 0.4 MG: 0.4 CAPSULE ORAL at 16:24

## 2025-01-17 RX ADMIN — FAMOTIDINE 10 MG: 20 TABLET, FILM COATED ORAL at 22:06

## 2025-01-17 RX ADMIN — INSULIN LISPRO 3 UNITS: 100 INJECTION, SOLUTION INTRAVENOUS; SUBCUTANEOUS at 16:25

## 2025-01-17 RX ADMIN — TORSEMIDE 40 MG: 20 TABLET ORAL at 08:41

## 2025-01-17 RX ADMIN — TIMOLOL MALEATE 1 DROP: 5 SOLUTION OPHTHALMIC at 09:07

## 2025-01-17 RX ADMIN — NYSTATIN: 100000 POWDER TOPICAL at 17:09

## 2025-01-17 RX ADMIN — ATORVASTATIN CALCIUM 40 MG: 40 TABLET, FILM COATED ORAL at 16:24

## 2025-01-17 RX ADMIN — APIXABAN 2.5 MG: 2.5 TABLET, FILM COATED ORAL at 17:06

## 2025-01-17 NOTE — ASSESSMENT & PLAN NOTE
Lab Results   Component Value Date    EGFR 20 01/17/2025    EGFR 19 01/16/2025    EGFR 20 01/16/2025    CREATININE 2.68 (H) 01/17/2025    CREATININE 2.75 (H) 01/16/2025    CREATININE 2.73 (H) 01/16/2025   Follows with Dr. Guadarrama outpatient  Baseline Cr 2.5-3.0 per outpatient nephrology note  Mildly elevated from recent labs, likely due to increasing torsemide prior to admission and poor oral intake  Hold evening dose of torsemide  Daily bmp

## 2025-01-17 NOTE — PHYSICAL THERAPY NOTE
PT Cancellation Note       01/17/25 3400   Note Type   Note Type Cancelled Session   Cancel Reasons Refusal  (Attempted to see pt this afternoon for PT treatment. Pt refusing, reports he does not want to get OOB at this time. Will follow-up as schedule allows.)   Licensure   [place] License Number  Mala Ramsay [id number]

## 2025-01-17 NOTE — ASSESSMENT & PLAN NOTE
Wt Readings from Last 3 Encounters:   01/14/25 69.9 kg (154 lb 1.6 oz)   01/06/25 70.3 kg (155 lb)   12/30/24 69.9 kg (154 lb)     ECHO 12/2024: EF 45%, systolic function mildly reduced, diastolic function abnormal  CXR: Layering small bilateral pleural effusions with probable adjacent parenchymal atelectasis, similar to prior study. Pulmonary vascular congestion.  , lower than previous values  Weight stable  Appears euvolemic  Son states patient had weight gain last week and was instructed by his PCP to take torsemide 60 mg twice daily x 3 days which he completed day prior to admission   Continue home torsemide 40 mg bid, will hold dose this evening due to poor oral intake and mildly elevated Cr  Monitor Is/Os and daily weights  Salt and fluid restriction

## 2025-01-17 NOTE — ASSESSMENT & PLAN NOTE
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, LVEF 25-30%, stage C, NYHA class IIIA  Unable to up-titrate GDMT for HF due to hypotension and orthostasis likely due to overdiuresis; now weaning dobutamine gtt as BP, CrCl, pro-NT-BNP and lactic improves with holding diuretics; still dry by RHC today with index 1.8  Most likely etiology of cardiomyopathy: CAD +/- TRISTAN +/- inappropriate sinus tach +/- undergoing workup (cardiac MRI with ischemic CM, PYP equivocal, gammopathy and genetics pending)  Patient and family are reviewing LVAD information to make a decision on pursuing VAD evaluation       RECOMMENDATIONS:  Orthostatics daily  Consider milrinone once euvolemic  Consider Bbrx when orthostasis resolved   Once orthosthasis resolves then will start ARB/ARNi  Continue Spironolactone 12.5mg daily  Continue jardiance 10mg daily  6 minute walk- will need PT  MOCA from 22, consistent with mild cognitive impairment  Inpatient sleep medicine consult pending (high risk for TRISTAN)  Continue digoxin 0.125mcg daily, check digoxin level daily (today 1.0 goal 0.7-1.2)  NOTE: insulin needs adjustments with wean of steroids/jardiance  Continue baby aspirin and plavix  DNR  Physical therapy/ambulate daily  Waiting on family to discuss options      All other care per primary team,      IMPRESSION:  Acute on chronic combined systolic/diastolic  heart failure  Stage D, NYHA class IV symptoms  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, LVEF 25-30% and 23% (by cMRI 1/3/23)  Undergoing evaluation for cardiac amyloidosis with cMRI  Coronary artery Evidenced by hypothermia, tachypnea, tachycardia in setting of likely UTI given recent removal of victor catheter  CXR negative for source of infection  COVID/flu/RSV negative  Procalcitonin mildly elevated though nonspecific in setting of CKD  Lactate negative  UA positive for leukocytes, WBC, bacteria  Blood cultures negative   Urine culture - proteus mirabilis susceptible to cefazolin   Continue IV rocephin   Patient initially hypothermic, normothermic after application of Ruben hugger  Trend cbc and fever curve   disease  CAD s/p CABG x 2: further disease best managed medically due to small vessel size   Peripheral arterial disease  Bilateral hydronephrosis s/p andrzej  Cardiac risk factors:  HTN  HL  TRISTAN, STOP-BANG 4  DM2  CKD, stage 3      INTERVAL EVENTS:  NAEO  Ambulating well  Improved appetite   Afebrile  -130s/50s, HR 80-90s  I/O net neg 2.8L  Cr 1.20  K 4.9  Alb 3.0  T Bili stable          LIFE GOALS:  Lifestyle goals reviewed with the patient. Patient's personal goals include: TBD  Important upcoming milestones or family events: TBD  The patient identifies the following as important for living well: TBD     Patient assessed. High suspicion of sleep apnea due to the following reasons. Will refer for sleep evaluation. [x] Heart Failure  [] Hypertention  [x] Atrial Fibrillation  [] BMI > 30  [] History of stroke  [] Diabetes  [x] Heavy snoring  [] Witnessed apnea  [] Hypoxemia                    HPI:  68 y.o. male who was admitted via ED for worsening SOB, poor appetite, orthopnea and fatigue. Pmhx of CAD s/p CABG, PAD, DM 2, recent admission for HFmrEF earlier this month. Serial TTEs show progressive decline in EF since 3/2021 with the most recent EF at 25-30%. Recent LHC shows diffuse CAD and no interventions indicated. Patient had [address] recently and there is some thought to myocarditis or other etiology causing worsening HF. [facility] was consulted for further evaluation and management of HFrEF. CARDIAC IMAGING:  Echo 1/9/23   Left Ventricle: Severely reduced left ventricular systolic function with a visually estimated EF of 25 - 30%. Left ventricle size is normal. Mildly increased wall thickness. Echo 12/26/22    Left Ventricle: Severely reduced left ventricular systolic function with a visually estimated EF of 25 - 30%. Left ventricle size is normal. Normal wall thickness. There are regional wall motion abnormalities. Grade II diastolic dysfunction with increased LAP.     Right Ventricle: Moderately reduced systolic function. TAPSE is abnormal. TAPSE is 1.1 cm. Aortic Valve: Mild stenosis of the aortic valve. AV peak gradient is 13 mmHg. AV peak velocity is 1.8 m/s. Mitral Valve: Not well visualized. Moderate annular calcification at the posterior leaflet of the mitral valve. Mild to moderate regurgitation. Tricuspid Valve: Mildly elevated RVSP. Left Atrium: Left atrium is moderately dilated. 12/8/22    Left Ventricle: Moderately reduced left ventricular systolic function with a visually estimated EF of 35 - 40%. Severe hypokinesis of the following segments: mid anteroseptal, apical anterior, apical septal, apical inferior and apical lateral. Severe hypokinesis of the apex. Mitral Valve: Severely thickened leaflet, at the anterior and posterior leaflets. Severely calcified leaflet, at the anterior and posterior leaflets. Mild annular calcification of the mitral valve. Moderate regurgitation. Left Atrium: Left atrium is mildly dilated. Contrast used: Definity. limited study     EKG 12/22/22 ST, Biatria enlargement, marked ST abnormality     C 12/6/22  1. Normal LVEDP  2. Severe native multivessel coronary artery disease  3. Patent LIMA to LAD and vein graft to distal RCA  4. Recurrent ISR in OM1 stent with now 60 to 70% restenosis  5. Recoil of left main and circumflex stent with now recurrent 40 to 50% stenosis. 6.  Progression of ostial left main disease now to about 60% stenosis  7. Progression of disease in jailed first marginal branch now with diffuse 90% stenosis  8.   High-grade stenosis in the mid to distal right potential femoral artery treated with 6 x 40 mm impact drug-coated balloon angioplasty to reduce the stenosis to less than 40%     NST        HEMODYNAMICS:  RHC 1/9/23  PA 20/9, RA 3, PCWP 8, CI 1.8    CPEST too ill   6MW 300 feet       PHYSICAL EXAM:  Visit Vitals  BP (!) 109/46   Pulse 81   Temp 98.4 °F (36.9 °C)   Resp 18   Ht 5' 9\" (1.753 m) Wt 114 lb 3.2 oz (51.8 kg)   SpO2 97%   BMI 16.86 kg/m²     Physical Exam  Vitals and nursing note reviewed. Constitutional:       Appearance: Normal appearance. He is underweight. Cardiovascular:      Rate and Rhythm: Normal rate and regular rhythm. Pulses: Normal pulses. Heart sounds: Normal heart sounds. Pulmonary:      Effort: No respiratory distress. Breath sounds: Normal breath sounds. Abdominal:      General: There is no distension. Palpations: Abdomen is soft. Musculoskeletal:         General: No swelling. Skin:     General: Skin is warm and dry. Neurological:      General: No focal deficit present. Mental Status: He is alert and oriented to person, place, and time. Psychiatric:         Mood and Affect: Mood normal.        REVIEW OF SYSTEMS:  Review of Systems   Constitutional:  Negative for chills, fever and malaise/fatigue. Respiratory:  Negative for cough and shortness of breath. Cardiovascular:  Negative for chest pain, palpitations and leg swelling. Gastrointestinal:  Negative for constipation, heartburn, nausea and vomiting. Musculoskeletal:  Negative for falls and myalgias. Neurological:  Negative for dizziness and headaches. Psychiatric/Behavioral:  Negative for depression.         PAST MEDICAL HISTORY:  Past Medical History:   Diagnosis Date    CAD (coronary artery disease) 11/10/2016    NSTEMI & 2 stents    Deafness 10/28/2012    DM (diabetes mellitus) ([facility])     Elevated cholesterol     Hypertension     [facility] 11/10/2016       PAST SURGICAL HISTORY:  Past Surgical History:   Procedure Laterality Date    COLONOSCOPY N/A 6/28/2018    COLONOSCOPY performed by Alphonso Pelayo MD at [address] [place] [address] ENDOSCOPY    [facility]  11/11/2016    2 stents       FAMILY HISTORY:  Family History   Problem Relation Age of Onset    Heart Disease Father     Heart Attack Father Hypertension Mother     Elevated Lipids Brother     Elevated Lipids Brother     No Known Problems Sister     Elevated Lipids Brother     No Known Problems Son     No Known Problems Daughter     Anesth Problems Neg Hx        SOCIAL HISTORY:  Social History     Socioeconomic History    Marital status:    Tobacco Use    Smoking status: Never     Passive exposure: Never    Smokeless tobacco: Never   Vaping Use    Vaping Use: Never used   Substance and Sexual Activity    Alcohol use: Yes     Alcohol/week: 2.0 standard drinks     Types: 1 Cans of beer, 1 Shots of liquor per week     Comment: rarely    Drug use: No    Sexual activity: Yes     Social Determinants of Health     Financial Resource Strain: Medium Risk    Difficulty of Paying Living Expenses: Somewhat hard   Food Insecurity: Food Insecurity Present    Worried About Running Out of Food in the Last Year: Never true    Ran Out of Food in the Last Year: Often true       LABORATORY RESULTS:     Labs Latest Ref Rng & Units 1/11/2023 1/10/2023 1/9/2023 1/8/2023 1/7/2023 1/6/2023 1/5/2023   WBC 4.1 - 11.1 K/uL 6.6 - 9.0 - 7.7 7.9 12. 3(H)   RBC 4.10 - 5.70 M/uL 3.65(L) - 3.66(L) - 4.42 3.90(L) 4.24   Hemoglobin 12.1 - 17.0 g/dL 8.5(L) - 8. 7(L) - 10. 1(L) 9.0(L) 9.6(L)   Hematocrit 36.6 - 50.3 % 29. 4(L) - 29. 2(L) - 35. 7(L) 31. 3(L) 33. 4(L)   MCV 80.0 - 99.0 FL 80.5 - 79. 8(L) - 80.8 80.3 78. 8(L)   Platelets 756 - 049 K/uL 276 - 254 - 273 247 297   Lymphocytes 12 - 49 % - - - - 15 11(L) 9(L)   Monocytes 5 - 13 % - - - - 6 8 7   Eosinophils 0 - 7 % - - - - 5 3 2   Basophils 0 - 1 % - - - - 1 1 1   Albumin 3.5 - 5.0 g/dL 3. 0(L) 2. 9(L) 2. 9(L) 3.0(L) 3. 1(L) 3.0(L) 3.4(L)   Calcium 8.5 - 10.1 MG/DL 9.2 9.3 9.4 9.1 9.1 9.4 9.9   Glucose 65 - 100 mg/dL 146(H) 77 192(H) 135(H) 110(H) 177(H) 75   BUN 6 - 20 MG/DL 27(H) 30(H) 36(H) 36(H) 40(H) 56(H) 77(H)   Creatinine 0.70 - 1.30 MG/DL 1.20 1.14 1.27 1.26 1.07 1.21 1.34(H)   Sodium 136 - 145 mmol/L 136 136 138 138 137 133(L) 133(L)   Potassium 3.5 - 5.1 mmol/L 4.6 4.3 4.6 4.8 4.4 4.3 4.2   TSH 0.36 - 3.74 uIU/mL - - - - - - -   PSA 0.01 - 4.0 ng/mL - - - - - - -   Some recent data might be hidden     Lab Results   Component Value Date/Time    TSH 2.12 12/27/2022 02:36 PM    TSH 4.80 (H) 12/06/2022 03:53 AM    TSH 5.39 (H) 10/12/2022 09:10 AM    TSH 3.53 02/03/2022 11:47 AM    TSH 5.790 (H) 11/21/2019 04:45 PM    TSH 3.08 06/22/2018 01:53 PM    TSH 4.250 05/26/2015 09:43 AM       ALLERGY:  No Known Allergies     CURRENT MEDICATIONS:    Current Facility-Administered Medications:     insulin glargine (LANTUS) injection 6 Units, 6 Units, SubCUTAneous, QHS, Cathy Sheldon CNS, 6 Units at 01/10/23 2138    insulin lispro (HUMALOG) injection 4 Units, 4 Units, SubCUTAneous, TID WITH MEALS, Cathy Sheldon CNS, 4 Units at 01/10/23 1722    insulin lispro (HUMALOG) injection, , SubCUTAneous, AC&HS, Cathy Sheldon CNS, 1 Units at 01/10/23 2138    dextrose 10 % infusion 0-250 mL, 0-250 mL, IntraVENous, PRN, Cathy Sheldon CNS    [Held by provider] bumetanide (BUMEX) tablet 1 mg, 1 mg, Oral, DAILY, Shaila, Lizette B, NP, 1 mg at 01/09/23 6641    spironolactone (ALDACTONE) tablet 12.5 mg, 12.5 mg, Oral, DAILY, Shaila, Lizette B, NP, 12.5 mg at 01/10/23 0850    melatonin tablet 3 mg, 3 mg, Oral, QHS PRN, Modesta Rodríguez NP, 3 mg at 01/10/23 2144    [Held by provider] DOBUTamine (DOBUTREX) 500 mg/250 mL (2,000 mcg/mL) infusion, 1 mcg/kg/min, IntraVENous, CONTINUOUS, Lorena Rodriguez MD, Last Rate: 1.6 mL/hr at 01/06/23 1137, 1 mcg/kg/min at 01/06/23 1137    empagliflozin (JARDIANCE) tablet 10 mg, 10 mg, Oral, DAILY, Lorena Rodriguez MD, 10 mg at 01/10/23 0847    dextrose 10 % infusion 0-250 mL, 0-250 mL, IntraVENous, PRN, Cathy Sheldon, SLICK    albuterol-ipratropium (DUO-NEB) 2.5 MG-0.5 MG/3 ML, 3 mL, Nebulization, Q6H PRN, Ramila Ballard MD    digoxin (LANOXIN) tablet 0.125 mg, 0.125 mg, Oral, DAILY, Colleen Astorga, JACOB, 0.125 mg at 01/10/23 0847    mirtazapine (REMERON) tablet 7.5 mg, 7.5 mg, Oral, QHS, Daisha Gannon MD, 7.5 mg at 01/10/23 2128    clopidogreL (PLAVIX) tablet 75 mg, 75 mg, Oral, DAILY, Delta Mccormick, JACOB, 75 mg at 01/10/23 0847    [Held by provider] potassium chloride SR (KLOR-CON 10) tablet 40 mEq, 40 mEq, Oral, BID, Rebeca MOLINA MD, 40 mEq at 01/03/23 0838    pantoprazole (PROTONIX) tablet 40 mg, 40 mg, Oral, ACB, Fiona Huberw, DO, 40 mg at 01/11/23 0631    heparin (porcine) injection 5,000 Units, 5,000 Units, SubCUTAneous, Q12H, Fiona Huberw, DO, 5,000 Units at 01/10/23 2127    QUEtiapine (SEROquel) tablet 50 mg, 50 mg, Oral, QHS, David Aguilar G, DO, 50 mg at 01/10/23 2128    lidocaine 4 % patch 1 Patch, 1 Patch, TransDERmal, Q24H, Leonard Michelle MD, 1 Patch at 01/10/23 1722    balsam peru-castor oiL (VENELEX) ointment, , Topical, BID, Janny Bernard MD, Given at 01/10/23 1722    alteplase (CATHFLO) 1 mg in sterile water (preservative free) 1 mL injection, 1 mg, InterCATHeter, PRN, Leonard Michelle MD    bacitracin 500 unit/gram packet 1 Packet, 1 Packet, Topical, PRN, Rebeca MOLINA MD    glucose chewable tablet 16 g, 4 Tablet, Oral, PRN, Rebeca MOLINA MD    glucagon (GLUCAGEN) injection 1 mg, 1 mg, IntraMUSCular, PRN, Leonard Michelle MD    senna-docusate (PERICOLACE) 8.6-50 mg per tablet 1 Tablet, 1 Tablet, Oral, BID PRN, Leonard Michelle MD, 1 Tablet at 12/26/22 0912    bisacodyL (DULCOLAX) suppository 10 mg, 10 mg, Rectal, QHS PRN, Walker Velasquez MD    sodium chloride (NS) flush 5-40 mL, 5-40 mL, IntraVENous, Q8H, Walker Aponte MD, 10 mL at 01/11/23 0629    sodium chloride (NS) flush 5-40 mL, 5-40 mL, IntraVENous, PRN, Leonard Michelle MD, 10 mL at 12/28/22 0845    acetaminophen (TYLENOL) tablet 650 mg, 650 mg, Oral, Q6H PRN, 650 mg at 01/05/23 2234 **OR** acetaminophen (TYLENOL) suppository 650 mg, 650 mg, Rectal, Q6H PRN, Sylvie Rushing MD    polyethylene glycol (MIRALAX) packet 17 g, 17 g, Oral, DAILY PRN, Sylvie Rushing MD, 17 g at 12/26/22 0649    ondansetron (ZOFRAN ODT) tablet 4 mg, 4 mg, Oral, Q8H PRN **OR** ondansetron (ZOFRAN) injection 4 mg, 4 mg, IntraVENous, Q6H PRN, Kobe MOLINA MD, 4 mg at 12/24/22 0849    aspirin delayed-release tablet 81 mg, 81 mg, Oral, DAILY, Raiza Youngblood MD, 81 mg at 01/10/23 0847    ipratropium (ATROVENT) 21 mcg (0.03 %) nasal spray 2 Spray, 2 Spray, Both Nostrils, Q12H, Raiza Youngblood MD, 2 [place] at 01/10/23 2300    tamsulosin (FLOMAX) capsule 0.4 mg, 0.4 mg, Oral, DAILY, Michel, Angeles Hernandez MD, 0.4 mg at 01/10/23 0847    sodium chloride (NS) flush 5-40 mL, 5-40 mL, IntraVENous, PRN, Sylvie Rushing MD    PATIENT CARE TEAM:  Patient Care Team:  Saman Salinas MD as PCP - General (Family Medicine)  Saman Salinas MD as PCP - [facility] Provider  Orlin Jasso MD (Cardiovascular Disease Physician)  Claudia Mullen MD (Gastroenterology)  Kai Vazquez MD (Cardiothoracic Surgery)  Manpreet Santamaria MD (Cardiovascular Disease Physician)  Darnell Valdivia MD (Nephrology)  Promise Castro RN as Care Transitions Nurse  Joaquim Chauhan MD (Pulmonary Disease)     Thank you for allowing me to participate in this patient's care.     Jocelin Gracia NP   [address] [place] [address], [address]  Phone: [phone]

## 2025-01-17 NOTE — PROGRESS NOTES
Progress Note - Hospitalist   Name: Yao Tipton 84 y.o. male I MRN: [id number]  Unit/Bed#: 2 [facility] 219 A I Date of Admission: 1/14/2025   Date of Service: 1/17/2025 I Hospital Day: 2    Assessment & Plan  Generalized weakness  Patient presented to ED with generalized weakness. Son reports patient saw PCP last week for ear pain/fatigue. Had recent victor removal on 01/08 in urology office.  Noted to be hypothermic, tachcyardic, and tachypneic on arrival to ED with abnormal UA  Likely in setting of UTI, continue treatment as below  Suspect urinary tract infection is likely due to Victor catheter  PT/OT eval and treatment, recommending STR at this time  Sepsis (HCC)  Evidenced by hypothermia, tachypnea, tachycardia in setting of likely UTI given recent removal of victor catheter  CXR negative for source of infection  COVID/flu/RSV negative  Procalcitonin mildly elevated though nonspecific in setting of CKD  Lactate negative  UA positive for leukocytes, WBC, bacteria  Blood cultures negative   Urine culture - proteus mirabilis susceptible to cefazolin   Continue IV rocephin   Patient initially hypothermic, normothermic after application of Ruben hugger  Trend cbc and fever curve  Urinary tract infection  Victor recently removed 01/08  UA positive for leukocytes, WBC, bacteria  Urine culture >100,000 cfu/ml proteus mirabilis susceptible to cefazolin   Continue IV rocephin   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 Pulmonary vascular congestion.  , lower than previous values  Weight stable  Appears euvolemic  Son states patient had weight gain last  week and was instructed by his PCP to take torsemide 60 mg twice daily x 3 days which he completed day prior to admission   Continue home torsemide 40 mg bid, will hold dose this evening due to poor oral intake and mildly elevated Cr  Monitor Is/Os and daily weights  Salt and fluid restriction  Essential hypertension  Continue home torsemide 40 mg bid  Diabetes mellitus with peripheral artery disease (HCC)  Lab Results   Component Value Date    HGBA1C 7.7 (H) 12/07/2024       Recent Labs     01/16/25  2047 01/17/25  0716 01/17/25  0758 01/17/25  1121   POCGLU 155* 42* 150* 228*       Blood Sugar Average: Last 72 hrs:  (P) [id number]  hold home glimepiride  SSI  Diabetic diet, encourage oral intake as patient has poor appetite; added magic cup supplements with meals  Chronic kidney disease, stage 4 (severe) (HCC)  Lab Results   Component Value Date    EGFR 20 01/17/2025    EGFR 19 01/16/2025    EGFR 20 01/16/2025    CREATININE 2.68 (H) 01/17/2025    CREATININE 2.75 (H) 01/16/2025    CREATININE 2.73 (H) 01/16/2025   Follows with Dr. Guadarrama outpatient  Baseline Cr 2.5-3.0 per outpatient nephrology note  Mildly elevated from recent labs, likely due to increasing torsemide prior to admission and poor oral intake  Hold evening dose of torsemide  Daily bmp  Chronic atrial fibrillation (HCC)  Continue eliquis 2.5 mg bid  Not on AV donnie blocker due to bradycardia  BPH (benign prostatic hyperplasia)  Continue flomax  Dupont inserted last admission for urinary retention, recently removed in urology office 01/08  Urinary retention protocol    VTE Pharmacologic Prophylaxis: VTE Score: 5 High Risk (Score >/= 5) - Pharmacological DVT Prophylaxis Ordered: enoxaparin (Lovenox). Sequential Compression Devices Ordered.    Mobility:   Basic Mobility Inpatient Raw Score: 14  -HLM Goal: 4: Move to chair/commode  JH-HLM Achieved: 2: Bed activities/Dependent transfer  -HLM Goal achieved. Continue to encourage appropriate  mobility.    Patient Centered Rounds: I performed bedside rounds with nursing staff today.   Discussions with Specialists or Other Care Team Provider: PT/OT, wound care, cm     Education and Discussions with Family / Patient: Attempted to update  (son) via phone. Left voicemail.     Current Length of Stay: 2 day(s)  Current Patient Status: Inpatient   Certification Statement: The patient will continue to require additional inpatient hospital stay due to hypoglycemia, poor oral intake, UTI   Discharge Plan: Anticipate discharge in 24-48 hrs to discharge location to be determined pending rehab evaluations.    Code Status: Level 3 - DNAR and DNI    Subjective   Patient lying in bed, awakes easily to voice. He is not currently experiencing pain. States he is overall fatigued but better since admission. He has not been eating much, states he will eat more today. He is very eager to go home.    Objective :  Temp:  [96.2 °F (35.7 °C)-97 °F (36.1 °C)] 97 °F (36.1 °C)  HR:  [67-82] 82  BP: (112-121)/(62-65) 121/65  Resp:  [18] 18  SpO2:  [98 %-100 %] 99 %  O2 Device: None (Room air)    Body mass index is 23.43 kg/m².     Input and Output Summary (last 24 hours):     Intake/Output Summary (Last 24 hours) at 1/17/2025 1351  Last data filed at 1/17/2025 1340  Gross per 24 hour   Intake 240 ml   Output 1500 ml   Net -1260 ml       Physical Exam  Vitals and nursing note reviewed.   Constitutional:       General: He is not in acute distress.     Appearance: He is well-developed.      Comments: Appears fatigued, though easily awakens and responds appropriately to questions   Cardiovascular:      Rate and Rhythm: Normal rate and regular rhythm.      Heart sounds: Normal heart sounds.   Pulmonary:      Effort: Pulmonary effort is normal. No respiratory distress.      Breath sounds: Decreased breath sounds present.   Abdominal:      General: Bowel sounds are normal.      Palpations: Abdomen is soft.      Tenderness:  There is no abdominal tenderness.   Musculoskeletal:         General: No swelling.   Skin:     General: Skin is warm and dry.   Neurological:      Mental Status: He is alert.   Psychiatric:         Mood and Affect: Mood normal.           Lines/Drains:  Lines/Drains/Airways       Active Status       Name Placement date Placement time Site Days    External Urinary Catheter 01/17/25  0900  -- less than 1                            Lab Results: I have reviewed the following results:   Results from last 7 days   Lab Units 01/17/25  0439 01/15/25  0520 01/14/25  1307   WBC Thousand/uL 4.89   < > 4.12*   HEMOGLOBIN g/dL 9.8*   < > 10.6*   HEMATOCRIT % 30.3*   < > 32.8*   PLATELETS Thousands/uL 81*   < > 102*   SEGS PCT %  --   --  76*   LYMPHO PCT %  --   --  12*   MONO PCT %  --   --  11   EOS PCT %  --   --  0    < > = values in this interval not displayed.     Results from last 7 days   Lab Units 01/17/25  0439 01/15/25  0520 01/14/25  1307   SODIUM mmol/L 140   < > 136   POTASSIUM mmol/L 4.4   < > 5.1   CHLORIDE mmol/L 100   < > 94*   CO2 mmol/L 32   < > 31   BUN mg/dL 89*   < > 95*   CREATININE mg/dL 2.68*   < > 2.32*   ANION GAP mmol/L 8   < > 11   CALCIUM mg/dL 7.6*   < > 8.2*   ALBUMIN g/dL  --   --  3.1*   TOTAL BILIRUBIN mg/dL  --   --  0.63   ALK PHOS U/L  --   --  147*   ALT U/L  --   --  24   AST U/L  --   --  19   GLUCOSE RANDOM mg/dL 33*   < > 209*    < > = values in this interval not displayed.     Results from last 7 days   Lab Units 01/14/25  1307   INR  2.26*     Results from last 7 days   Lab Units 01/17/25  1121 01/17/25  0758 01/17/25  0716 01/16/25  2047 01/16/25  1544 01/16/25  1107 01/16/25  0803 01/16/25  0721 01/16/25  0719 01/15/25  2051 01/15/25  1542 01/15/25  1104   POC GLUCOSE mg/dl 228* 150* 42* 155* 180* 226* 128 32* 34* 124 74 93         Results from last 7 days   Lab Units 01/15/25  0520 01/14/25  1520 01/14/25  1307   LACTIC ACID mmol/L  --  0.6  --    PROCALCITONIN ng/ml 0.25  --   0.28*       Recent Cultures (last 7 days):   Results from last 7 days   Lab Units 01/14/25  1525 01/14/25  1522 01/14/25  1520   BLOOD CULTURE  No Growth at 48 hrs.  --  No Growth at 48 hrs.   URINE CULTURE   --  >100,000 cfu/ml Proteus mirabilis*  --        Imaging Results Review: No pertinent imaging studies reviewed.  Other Study Results Review: No additional pertinent studies reviewed.    Last 24 Hours Medication List:     Current Facility-Administered Medications:     acetaminophen (TYLENOL) tablet 650 mg, Q6H PRN    allopurinol (ZYLOPRIM) tablet 100 mg, BID    apixaban (ELIQUIS) tablet 2.5 mg, BID    atorvastatin (LIPITOR) tablet 40 mg, Daily With Dinner    cefTRIAXone (ROCEPHIN) IVPB (premix in dextrose) 1,000 mg 50 mL, Q24H, Last Rate: 1,000 mg (01/16/25 1448)    famotidine (PEPCID) tablet 10 mg, HS    ferrous sulfate tablet 325 mg, Daily With Breakfast    insulin lispro (HumALOG/ADMELOG) 100 units/mL subcutaneous injection 1-5 Units, 4x Daily (AC & HS) **AND** Fingerstick Glucose (POCT), 4x Daily AC and at bedtime    nystatin (MYCOSTATIN) powder, BID    sodium hypochlorite (DAKIN'S HALF-STRENGTH) 0.25 percent topical solution 1 Application, Daily    tamsulosin (FLOMAX) capsule 0.4 mg, Daily With Dinner    timolol (TIMOPTIC) 0.5 % ophthalmic solution 1 drop, Daily    [Held by provider] torsemide (DEMADEX) tablet 40 mg, BID    Administrative Statements   Today, Patient Was Seen By: Katrina Lobo PA-C    **Please Note: This note may have been constructed using a voice recognition system.**

## 2025-01-17 NOTE — CASE MANAGEMENT
Case Management Discharge Planning Note    Patient name Yao Tipton  Location 2 [facility] [address] A MRN [id number]  : 1940 Date 2025       Current Admission Date: 2025  Current Admission Diagnosis:Generalized weakness   Patient Active Problem List    Diagnosis Date Noted Date Diagnosed    Urinary tract infection 2025     Generalized weakness 2025     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 ([facility]) 2024     Hospital discharge follow-up 2024     Macrocytic anemia 2024     Gross hematuria 12/10/2024     Open wound of both lower extremities 2024     Unable to care for self 2024     Gout 2024     Left ankle pain 2023     Chronic combined systolic and diastolic CHF (congestive heart failure) ([facility]) 2023     Dyslipidemia 2023     BPH (benign prostatic hyperplasia) 2023     Cellulitis of left lower extremity 2023     Constipation 08/10/2023     Pleural effusion exudative 2023     Sepsis ([facility]) 2023     Goals of care, counseling/discussion 2023     Duodenal ulcer 07/10/2023     Encephalopathy 2023     Recent empyema of lung with persistent locuted R hydropneumothorax 2023     Hypoglycemia 2023     Thrombocytopenia ([facility]) 2023     Diarrhea 2023     Hypothermia 2023     Septic shock ([facility]) 2023     Acute urinary retention 2023     Macrocytosis 2023     Chronic kidney disease-mineral and bone disorder 2023     Advanced care planning/counseling discussion 2023     Benign hypertension with CKD (chronic kidney disease) stage IV ([facility]) 2022     Persistent proteinuria 2022     COVID-19 2022     Electrolyte abnormality 2022     Cellulitis of left upper extremity 2021     Chronic atrial fibrillation ([facility]) 2021     Vitamin D deficiency 10/18/2019     Chronic kidney disease, stage 4  (severe) ([facility]) 04/05/2019     Acute on chronic combined systolic and diastolic congestive heart failure (HCC) 01/29/2019     Recent pericardial effusion 01/13/2019     Leg edema 01/10/2019     Diabetes mellitus with peripheral artery disease (HCC) 01/10/2019     PVC (premature ventricular contraction) 12/19/2018     Essential hypertension 12/19/2018     Closed traumatic displaced fracture of distal end of left radius with malunion 02/09/2018       LOS (days): 2  Geometric Mean LOS (GMLOS) (days): 4.8  Days to GMLOS:2.6     OBJECTIVE:  Risk of Unplanned Readmission Score: 34.02         Current admission status: Inpatient   Preferred Pharmacy:   [id number] - [place], [place] [facility] [address]  [facility]  [address] [address]  Phone: [phone] Fax: [phone]    Primary Care Provider: Nicho Baltazar DO    Primary Insurance: MEDICARE  Secondary Insurance: BLUE CROSS    DISCHARGE DETAILS:    Requested Home Health Care         Is the patient interested in C at discharge?: Yes  Home Health Discipline requested:: Nursing, Occupational Therapy, Physical Therapy  HHA External Referral Reason (only applicable if external HHA name selected): Patient has established relationship with provider  Home Health Follow-Up Provider:: PCP  Home Health Services Needed:: Wound/Ostomy Care, Strengthening/Theraputic Exercises to Improve Function, Gait/ADL Training, Evaluate Functional Status and Safety, Diabetes Management, Heart Failure Management  Homebound Criteria Met:: Requires the Assistance of Another Person for Safe Ambulation or to Leave the Home, Uses an Assist Device (i.e. cane, walker, etc)  Supporting Clincal Findings:: Limited Endurance     Other Referral/Resources/Interventions Provided:  Interventions: HHC  Referral Comments: SN orders added to HHC referral to [facility].     Treatment Team Recommendation: Home with Home Health Care, Short Term Rehab  Discharge Destination Plan:: Home with  Home Health Care

## 2025-01-17 NOTE — PLAN OF CARE
Problem: Prexisting or High Potential for Compromised Skin Integrity  Goal: Skin integrity is maintained or improved  Description: INTERVENTIONS:  - Identify patients at risk for skin breakdown  - Assess and monitor skin integrity  - Assess and monitor nutrition and hydration status  - Monitor labs   - Assess for incontinence   - Turn and reposition patient  - Assist with mobility/ambulation  - Relieve pressure over bony prominences  - Avoid friction and shearing  - Provide appropriate hygiene as needed including keeping skin clean and dry  - Evaluate need for skin moisturizer/barrier cream  - Collaborate with interdisciplinary team   - Patient/family teaching  - Consider wound care consult   Outcome: Progressing     Problem: SAFETY ADULT  Goal: Patient will remain free of falls  Description: INTERVENTIONS:  - Educate patient/family on patient safety including physical limitations  - Instruct patient to call for assistance with activity   - Consult OT/PT to assist with strengthening/mobility   - Keep Call bell within reach  - Keep bed low and locked with side rails adjusted as appropriate  - Keep care items and personal belongings within reach  - Initiate and maintain comfort rounds  - Make Fall Risk Sign visible to staff  - Offer Toileting every 2 Hours, in advance of need  - Initiate/Maintain bedalarm  - Obtain necessary fall risk management equipment: alarm  - Apply yellow socks and bracelet for high fall risk patients  - Consider moving patient to room near nurses station  Outcome: Progressing     Problem: SAFETY ADULT  Goal: Maintain or return to baseline ADL function  Description: INTERVENTIONS:  -  Assess patient's ability to carry out ADLs; assess patient's baseline for ADL function and identify physical deficits which impact ability to perform ADLs (bathing, care of mouth/teeth, toileting, grooming, dressing, etc.)  - Assess/evaluate cause of self-care deficits   - Assess range of motion  - Assess  patient's mobility; develop plan if impaired  - Assess patient's need for assistive devices and provide as appropriate  - Encourage maximum independence but intervene and supervise when necessary  - Involve family in performance of ADLs  - Assess for home care needs following discharge   - Consider OT consult to assist with ADL evaluation and planning for discharge  - Provide patient education as appropriate  Outcome: Progressing     Problem: SAFETY ADULT  Goal: Maintains/Returns to pre admission functional level  Description: INTERVENTIONS:  - Perform AM-PAC 6 Click Basic Mobility/ Daily Activity assessment daily.  - Set and communicate daily mobility goal to care team and patient/family/caregiver.   - Collaborate with rehabilitation services on mobility goals if consulted  - Perform Range of Motion 3 times a day.  - Reposition patient every 2 hours.  - Dangle patient 3 times a day  - Stand patient 3 times a day  - Ambulate patient 3 times a day  - Out of bed to chair 3 times a day   - Out of bed for meals 3 times a day  - Out of bed for toileting  - Record patient progress and toleration of activity level   Outcome: Progressing     Problem: GENITOURINARY - ADULT  Goal: Maintains or returns to baseline urinary function  Description: INTERVENTIONS:  - Assess urinary function  - Encourage oral fluids to ensure adequate hydration if ordered  - Administer IV fluids as ordered to ensure adequate hydration  - Administer ordered medications as needed  - Offer frequent toileting  - Follow urinary retention protocol if ordered  Outcome: Progressing  Goal: Absence of urinary retention  Description: INTERVENTIONS:  - Assess patient’s ability to void and empty bladder  - Monitor I/O  - Bladder scan as needed  - Discuss with physician/AP medications to alleviate retention as needed  - Discuss catheterization for long term situations as appropriate  Outcome: Progressing     Problem: MUSCULOSKELETAL - ADULT  Goal: Maintain or  return mobility to safest level of function  Description: INTERVENTIONS:  - Assess patient's ability to carry out ADLs; assess patient's baseline for ADL function and identify physical deficits which impact ability to perform ADLs (bathing, care of mouth/teeth, toileting, grooming, dressing, etc.)  - Assess/evaluate cause of self-care deficits   - Assess range of motion  - Assess patient's mobility  - Assess patient's need for assistive devices and provide as appropriate  - Encourage maximum independence but intervene and supervise when necessary  - Involve family in performance of ADLs  - Assess for home care needs following discharge   - Consider OT consult to assist with ADL evaluation and planning for discharge  - Provide patient education as appropriate  Outcome: Progressing  Goal: Maintain proper alignment of affected body part  Description: INTERVENTIONS:  - Support, maintain and protect limb and body alignment  - Provide patient/ family with appropriate education  Outcome: Progressing     Problem: Nutrition/Hydration-ADULT  Goal: Nutrient/Hydration intake appropriate for improving, restoring or maintaining nutritional needs  Description: Monitor and assess patient's nutrition/hydration status for malnutrition. Collaborate with interdisciplinary team and initiate plan and interventions as ordered.  Monitor patient's weight and dietary intake as ordered or per policy. Utilize nutrition screening tool and intervene as necessary. Determine patient's food preferences and provide high-protein, high-caloric foods as appropriate.     INTERVENTIONS:  - Monitor oral intake, urinary output, labs, and treatment plans  - Assess nutrition and hydration status and recommend course of action  - Evaluate amount of meals eaten  - Assist patient with eating if necessary   - Allow adequate time for meals  - Recommend/ encourage appropriate diets, oral nutritional supplements, and vitamin/mineral supplements  - Order, calculate,  and assess calorie counts as needed  - Recommend, monitor, and adjust tube feedings and TPN/PPN based on assessed needs  - Assess need for intravenous fluids  - Provide specific nutrition/hydration education as appropriate  - Include patient/family/caregiver in decisions related to nutrition  Outcome: Progressing

## 2025-01-17 NOTE — PLAN OF CARE
Problem: Prexisting or High Potential for Compromised Skin Integrity  Goal: Skin integrity is maintained or improved  Description: INTERVENTIONS:  - Identify patients at risk for skin breakdown  - Assess and monitor skin integrity  - Assess and monitor nutrition and hydration status  - Monitor labs   - Assess for incontinence   - Turn and reposition patient  - Assist with mobility/ambulation  - Relieve pressure over bony prominences  - Avoid friction and shearing  - Provide appropriate hygiene as needed including keeping skin clean and dry  - Evaluate need for skin moisturizer/barrier cream  - Collaborate with interdisciplinary team   - Patient/family teaching  - Consider wound care consult   Outcome: Progressing     Problem: SAFETY ADULT  Goal: Patient will remain free of falls  Description: INTERVENTIONS:  - Educate patient/family on patient safety including physical limitations  - Instruct patient to call for assistance with activity   - Consult OT/PT to assist with strengthening/mobility   - Keep Call bell within reach  - Keep bed low and locked with side rails adjusted as appropriate  - Keep care items and personal belongings within reach  - Initiate and maintain comfort rounds  - Make Fall Risk Sign visible to staff  - Apply yellow socks and bracelet for high fall risk patients  - Consider moving patient to room near nurses station  Outcome: Progressing  Goal: Maintain or return to baseline ADL function  Description: INTERVENTIONS:  -  Assess patient's ability to carry out ADLs; assess patient's baseline for ADL function and identify physical deficits which impact ability to perform ADLs (bathing, care of mouth/teeth, toileting, grooming, dressing, etc.)  - Assess/evaluate cause of self-care deficits   - Assess range of motion  - Assess patient's mobility; develop plan if impaired  - Assess patient's need for assistive devices and provide as appropriate  - Encourage maximum independence but intervene and  supervise when necessary  - Involve family in performance of ADLs  - Assess for home care needs following discharge   - Consider OT consult to assist with ADL evaluation and planning for discharge  - Provide patient education as appropriate  Outcome: Progressing  Goal: Maintains/Returns to pre admission functional level  Description: INTERVENTIONS:  - Perform AM-PAC 6 Click Basic Mobility/ Daily Activity assessment daily.  - Set and communicate daily mobility goal to care team and patient/family/caregiver.   - Collaborate with rehabilitation services on mobility goals if consulted  - Out of bed for toileting  - Record patient progress and toleration of activity level   Outcome: Progressing     Problem: DISCHARGE PLANNING  Goal: Discharge to home or other facility with appropriate resources  Description: INTERVENTIONS:  - Identify barriers to discharge w/patient and caregiver  - Arrange for needed discharge resources and transportation as appropriate  - Identify discharge learning needs (meds, wound care, etc.)  - Arrange for interpretive services to assist at discharge as needed  - Refer to Case Management Department for coordinating discharge planning if the patient needs post-hospital services based on physician/advanced practitioner order or complex needs related to functional status, cognitive ability, or social support system  Outcome: Progressing     Problem: Knowledge Deficit  Goal: Patient/family/caregiver demonstrates understanding of disease process, treatment plan, medications, and discharge instructions  Description: Complete learning assessment and assess knowledge base.  Interventions:  - Provide teaching at level of understanding  - Provide teaching via preferred learning methods  Outcome: Progressing     Problem: GENITOURINARY - ADULT  Goal: Maintains or returns to baseline urinary function  Description: INTERVENTIONS:  - Assess urinary function  - Encourage oral fluids to ensure adequate hydration  if ordered  - Administer IV fluids as ordered to ensure adequate hydration  - Administer ordered medications as needed  - Offer frequent toileting  - Follow urinary retention protocol if ordered  Outcome: Progressing  Goal: Absence of urinary retention  Description: INTERVENTIONS:  - Assess patient’s ability to void and empty bladder  - Monitor I/O  - Bladder scan as needed  - Discuss with physician/AP medications to alleviate retention as needed  - Discuss catheterization for long term situations as appropriate  Outcome: Progressing     Problem: MUSCULOSKELETAL - ADULT  Goal: Maintain or return mobility to safest level of function  Description: INTERVENTIONS:  - Assess patient's ability to carry out ADLs; assess patient's baseline for ADL function and identify physical deficits which impact ability to perform ADLs (bathing, care of mouth/teeth, toileting, grooming, dressing, etc.)  - Assess/evaluate cause of self-care deficits   - Assess range of motion  - Assess patient's mobility  - Assess patient's need for assistive devices and provide as appropriate  - Encourage maximum independence but intervene and supervise when necessary  - Involve family in performance of ADLs  - Assess for home care needs following discharge   - Consider OT consult to assist with ADL evaluation and planning for discharge  - Provide patient education as appropriate  Outcome: Progressing  Goal: Maintain proper alignment of affected body part  Description: INTERVENTIONS:  - Support, maintain and protect limb and body alignment  - Provide patient/ family with appropriate education  Outcome: Progressing     Problem: Nutrition/Hydration-ADULT  Goal: Nutrient/Hydration intake appropriate for improving, restoring or maintaining nutritional needs  Description: Monitor and assess patient's nutrition/hydration status for malnutrition. Collaborate with interdisciplinary team and initiate plan and interventions as ordered.  Monitor patient's weight  and dietary intake as ordered or per policy. Utilize nutrition screening tool and intervene as necessary. Determine patient's food preferences and provide high-protein, high-caloric foods as appropriate.     INTERVENTIONS:  - Monitor oral intake, urinary output, labs, and treatment plans  - Assess nutrition and hydration status and recommend course of action  - Evaluate amount of meals eaten  - Assist patient with eating if necessary   - Allow adequate time for meals  - Recommend/ encourage appropriate diets, oral nutritional supplements, and vitamin/mineral supplements  - Order, calculate, and assess calorie counts as needed  - Recommend, monitor, and adjust tube feedings and TPN/PPN based on assessed needs  - Assess need for intravenous fluids  - Provide specific nutrition/hydration education as appropriate  - Include patient/family/caregiver in decisions related to nutrition  Outcome: Progressing

## 2025-01-17 NOTE — ASSESSMENT & PLAN NOTE
Dupont recently removed 01/08  UA positive for leukocytes, WBC, bacteria  Urine culture >100,000 cfu/ml proteus mirabilis susceptible to cefazolin   Continue IV rocephin

## 2025-01-17 NOTE — ASSESSMENT & PLAN NOTE
Lab Results   Component Value Date    HGBA1C 7.7 (H) 12/07/2024       Recent Labs     01/16/25  2047 01/17/25  0716 01/17/25  0758 01/17/25  1121   POCGLU 155* 42* 150* 228*       Blood Sugar Average: Last 72 hrs:  (P) [id number]  hold home glimepiride  SSI  Diabetic diet, encourage oral intake as patient has poor appetite; added magic cup supplements with meals

## 2025-01-17 NOTE — CASE MANAGEMENT
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 ([facility]) 2024     Hospital discharge follow-up 2024     Macrocytic anemia 2024     Gross hematuria 12/10/2024     Open wound of both lower extremities 2024     Unable to care for self 2024     Gout 2024     Left ankle pain 2023     Chronic combined systolic and diastolic CHF (congestive heart failure) ([facility]) 2023     Dyslipidemia 2023     BPH (benign prostatic hyperplasia) 2023     Cellulitis of left lower extremity 2023     Constipation 08/10/2023     Pleural effusion exudative 2023     Sepsis ([facility]) 2023     Goals of care, counseling/discussion 2023     Duodenal ulcer 07/10/2023     Encephalopathy 2023     Recent empyema of lung with persistent locuted R hydropneumothorax 2023     Hypoglycemia 2023     Thrombocytopenia ([facility]) 2023     Diarrhea 2023     Hypothermia 2023     Septic shock ([facility]) 2023     Acute urinary retention 2023     Macrocytosis 2023     Chronic kidney disease-mineral and bone disorder 2023     Advanced care planning/counseling discussion 2023     Benign hypertension with CKD (chronic kidney disease) stage IV ([facility]) 2022     Persistent proteinuria 2022     COVID-19 2022     Electrolyte abnormality 2022     Cellulitis of left upper extremity 2021     Chronic atrial fibrillation ([facility]) 2021     Vitamin D deficiency 10/18/2019     Chronic kidney disease, stage 4  (severe) ([facility]) 04/05/2019     Acute on chronic combined systolic and diastolic congestive heart failure (HCC) 01/29/2019     Recent pericardial effusion 01/13/2019     Leg edema 01/10/2019     Diabetes mellitus with peripheral artery disease (HCC) 01/10/2019     PVC (premature ventricular contraction) 12/19/2018     Essential hypertension 12/19/2018     Closed traumatic displaced fracture of distal end of left radius with malunion 02/09/2018       LOS (days): 2  Geometric Mean LOS (GMLOS) (days): 4.8  Days to GMLOS:2.6     OBJECTIVE:  Risk of Unplanned Readmission Score: 33.96         Current admission status: Inpatient   Preferred Pharmacy:   [id number] - [place], [place] - [address] [place] [address]  [address] [place] [id number]  Phone: [phone] Fax: [phone]    Primary Care Provider: Nicho Baltazar DO    Primary Insurance: MEDICARE  Secondary Insurance: BLUE CROSS    DISCHARGE DETAILS:    Other Referral/Resources/Interventions Provided:  Referral Comments: Left voicemail for son González to discuss dcp for STR vs HHC. Requested call back and phone number provided. RN CM will continue to follow.

## 2025-01-18 VITALS
DIASTOLIC BLOOD PRESSURE: 60 MMHG | WEIGHT: 154.1 LBS | OXYGEN SATURATION: 99 % | TEMPERATURE: 97.7 F | RESPIRATION RATE: 18 BRPM | SYSTOLIC BLOOD PRESSURE: 114 MMHG | BODY MASS INDEX: 23.43 KG/M2 | HEART RATE: 86 BPM

## 2025-01-18 LAB
ANION GAP SERPL CALCULATED.3IONS-SCNC: 9 MMOL/L (ref 4–13)
BUN SERPL-MCNC: 89 MG/DL (ref 5–25)
CALCIUM SERPL-MCNC: 7.5 MG/DL (ref 8.4–10.2)
CHLORIDE SERPL-SCNC: 99 MMOL/L (ref 96–108)
CO2 SERPL-SCNC: 30 MMOL/L (ref 21–32)
CREAT SERPL-MCNC: 2.6 MG/DL (ref 0.6–1.3)
ERYTHROCYTE [DISTWIDTH] IN BLOOD BY AUTOMATED COUNT: 16.9 % (ref 11.6–15.1)
GFR SERPL CREATININE-BSD FRML MDRD: 21 ML/MIN/1.73SQ M
GLUCOSE SERPL-MCNC: 111 MG/DL (ref 65–140)
GLUCOSE SERPL-MCNC: 111 MG/DL (ref 65–140)
GLUCOSE SERPL-MCNC: 163 MG/DL (ref 65–140)
HCT VFR BLD AUTO: 28.7 % (ref 36.5–49.3)
HGB BLD-MCNC: 9.3 G/DL (ref 12–17)
MCH RBC QN AUTO: 31.3 PG (ref 26.8–34.3)
MCHC RBC AUTO-ENTMCNC: 32.4 G/DL (ref 31.4–37.4)
MCV RBC AUTO: 97 FL (ref 82–98)
PLATELET # BLD AUTO: 77 THOUSANDS/UL (ref 149–390)
PMV BLD AUTO: 9.4 FL (ref 8.9–12.7)
POTASSIUM SERPL-SCNC: 4.1 MMOL/L (ref 3.5–5.3)
RBC # BLD AUTO: 2.97 MILLION/UL (ref 3.88–5.62)
SODIUM SERPL-SCNC: 138 MMOL/L (ref 135–147)
WBC # BLD AUTO: 6.15 THOUSAND/UL (ref 4.31–10.16)

## 2025-01-18 PROCEDURE — 82948 REAGENT STRIP/BLOOD GLUCOSE: CPT

## 2025-01-18 PROCEDURE — 80048 BASIC METABOLIC PNL TOTAL CA: CPT

## 2025-01-18 PROCEDURE — 85027 COMPLETE CBC AUTOMATED: CPT

## 2025-01-18 PROCEDURE — 99239 HOSP IP/OBS DSCHRG MGMT >30: CPT

## 2025-01-18 RX ORDER — GINSENG 100 MG
1 CAPSULE ORAL 2 TIMES DAILY
Status: DISCONTINUED | OUTPATIENT
Start: 2025-01-18 | End: 2025-01-18 | Stop reason: HOSPADM

## 2025-01-18 RX ORDER — CEPHALEXIN 500 MG/1
500 CAPSULE ORAL EVERY 8 HOURS SCHEDULED
Start: 2025-01-18 | End: 2025-01-19

## 2025-01-18 RX ADMIN — BACITRACIN 1 SMALL APPLICATION: 500 OINTMENT TOPICAL at 12:34

## 2025-01-18 RX ADMIN — ALLOPURINOL 100 MG: 100 TABLET ORAL at 10:19

## 2025-01-18 RX ADMIN — NYSTATIN: 100000 POWDER TOPICAL at 10:22

## 2025-01-18 RX ADMIN — TORSEMIDE 40 MG: 20 TABLET ORAL at 10:19

## 2025-01-18 RX ADMIN — FERROUS SULFATE TAB 325 MG (65 MG ELEMENTAL FE) 325 MG: 325 (65 FE) TAB at 10:19

## 2025-01-18 RX ADMIN — APIXABAN 2.5 MG: 2.5 TABLET, FILM COATED ORAL at 10:19

## 2025-01-18 RX ADMIN — INSULIN LISPRO 1 UNITS: 100 INJECTION, SOLUTION INTRAVENOUS; SUBCUTANEOUS at 11:45

## 2025-01-18 RX ADMIN — HYOSCYAMINE SULFATE 1 APPLICATION: 16 SOLUTION at 10:22

## 2025-01-18 RX ADMIN — TIMOLOL MALEATE 1 DROP: 5 SOLUTION OPHTHALMIC at 10:20

## 2025-01-18 NOTE — [place] UNIVERSAL TRANSFER FORM
[place] UNIVERSAL TRANSFER FORM  (ALL ITEMS MUST BE COMPLETED)    1. TRANSFER FROM: [facility]      TRANSFER TO: [facility]    2. DATE OF TRANSFER: 1/18/2025                        TIME OF TRANSFER: 1330    3. PATIENT NAME: Yao Tipton K      YOB: 1940                             GENDER: male    4. LANGUAGE:   English    5. PHYSICIAN NAME:  Guzman Ibarra DO                   PHONE: [phone]    6. CODE STATUS: Level 3 - DNAR and DNI        Out of Hospital DNR Attached: Yes    7. :                                      :  Extended Emergency Contact Information  Primary Emergency Contact: González Tipton  Address: [address]  Mobile Phone: [phone]  Relation: Son  Secondary Emergency Contact: Gisselle Edwards  [employer] Phone: [phone]  Relation: Sister In-Law           Health Care Representative/Proxy:  Yes           Legal Guardian:  No             NAME OF:           HEALTH CARE REPRESENTATIVE/PROXY:                                         OR           LEGAL GUARDIAN, IF NOT :                                               PHONE:  (Day)           (Night)                        (Cell)    8. REASON FOR TRANSFER: (Must include brief medical history and recent changes in physical function or cognition.) Generalized weakness, UTI            V/S: /60   Pulse 86   Temp 97.7 °F (36.5 °C) (Oral)   Resp 18   Wt 69.9 kg (154 lb 1.6 oz)   SpO2 99%   BMI 23.43 kg/m²           PAIN: None    9. PRIMARY DIAGNOSIS: Generalized weakness      Secondary Diagnosis:         Pacemaker: No      Internal Defib: No          Mental Health Diagnosis (if Applicable):    10. RESTRAINTS: No     11. RESPIRATORY NEEDS: None    12. ISOLATION/PRECAUTION: None    13. ALLERGY: Elemental sulfur and Sulfa antibiotics    14. SENSORY:       Vision  Glasses Hard of hearing    15. SKIN CONDITION: Yes:  CommentDiscoloration to sacrum, wound on nose and bilateral lower legs    16. DIET: Regular Na 2g, Fluid restriction 1800    17. IV ACCESS: None    18. PERSONAL ITEMS SENT WITH PATIENT: Glasses    19. ATTACHED DOCUMENTS: MUST ATTACH CURRENT MEDICATION INFORMATION Face Sheet and MAR    20. AT RISK ALERTS:Falls, Pressure Ulcer, and Aspiration        HARM TO: N/A    21. WEIGHT BEARING STATUS:         Left Leg: Full        Right Leg: Full    22. MENTAL STATUS:Alert, Oriented, and Forgetful    23. FUNCTION:        Walk: With Help        Transfer: With Help        Toilet: With Help        Feed: With Help    24. IMMUNIZATIONS/SCREENING:     Immunization History   Administered Date(s) Administered    Influenza, high dose seasonal 0.7 mL 01/05/2021    Pneumococcal Conjugate 13-Valent 01/13/2019    Tdap 01/05/2021       25. BOWEL: Incontinent     26. BLADDER: Incontinent    27. SENDING FACILITY CONTACT: Libby Webb                  Title: RN        Unit: [address] [place] [address]        Phone: [phone]          REC'G FACILITY CONTACT (if known):        Title:        Unit:         Phone:         FORM PREFILLED BY (if applicable)       Title:       Unit:        Phone:         FORM COMPLETED BY Libby Webb RN      Title: [facility]ERTO      Phone: [phone]

## 2025-01-18 NOTE — ASSESSMENT & PLAN NOTE
Patient presented to ED with generalized weakness. Son reports patient saw PCP last week for ear pain/fatigue. Had recent victor removal on 01/08 in urology office.  Noted to be hypothermic, tachcyardic, and tachypneic on arrival to ED with abnormal UA  Likely in setting of UTI, continue treatment as below  PT/OT eval and treatment, recommending STR, will be discharged to [facility]

## 2025-01-18 NOTE — ASSESSMENT & PLAN NOTE
Lab Results   Component Value Date    EGFR 21 01/18/2025    EGFR 20 01/17/2025    EGFR 19 01/16/2025    CREATININE 2.60 (H) 01/18/2025    CREATININE 2.68 (H) 01/17/2025    CREATININE 2.75 (H) 01/16/2025   Follows with Dr. Guadarrama outpatient  Baseline Cr 2.5-3.0 per outpatient nephrology note  Currently stable at 2.60  Continue outpatient follow up with nephrology  Repeat bmp in 1 week at rehab

## 2025-01-18 NOTE — ASSESSMENT & PLAN NOTE
Dupont recently removed 01/08  UA positive for leukocytes, WBC, bacteria  Urine culture >100,000 cfu/ml proteus mirabilis susceptible to cefazolin   Completed 4 days rocephin, transition to keflex on discharge

## 2025-01-18 NOTE — ASSESSMENT & PLAN NOTE
Evidenced by hypothermia, tachypnea, tachycardia in setting of UTI  Patient initially hypothermic, normothermic after application of Ruben hugger  CXR negative for source of infection  COVID/flu/RSV negative  Procalcitonin mildly elevated though nonspecific in setting of CKD  Lactate negative  UA positive for leukocytes, WBC, bacteria  Blood cultures negative   Urine culture - proteus mirabilis susceptible to cefazolin   Completed 4 days rocephin, transition to keflex on discharge  Has remained afebrile without leukocytosis, hemodynamically stable

## 2025-01-18 NOTE — INCIDENTAL FINDINGS
The following findings require follow up:  Non-Radiographic finding   Finding: full thickness wound to left outer ear with dry tan and black eschar; suspicious wound given history of skin cancer excisions   Follow up required: follow up with dermatology or PCP   Follow up should be done within 1 month(s)    Please notify the following clinician to assist with the follow up:   Dr. Nicho Baltazar, DO    Incidental finding results were discussed with the Patient by Katrina Lobo PA-C on 01/18/25.   They expressed understanding and all questions answered.

## 2025-01-18 NOTE — PLAN OF CARE
Problem: Prexisting or High Potential for Compromised Skin Integrity  Goal: Skin integrity is maintained or improved  Description: INTERVENTIONS:  - Identify patients at risk for skin breakdown  - Assess and monitor skin integrity  - Assess and monitor nutrition and hydration status  - Monitor labs   - Assess for incontinence   - Turn and reposition patient  - Assist with mobility/ambulation  - Relieve pressure over bony prominences  - Avoid friction and shearing  - Provide appropriate hygiene as needed including keeping skin clean and dry  - Evaluate need for skin moisturizer/barrier cream  - Collaborate with interdisciplinary team   - Patient/family teaching  - Consider wound care consult   Outcome: Progressing     Problem: SAFETY ADULT  Goal: Maintain or return to baseline ADL function  Description: INTERVENTIONS:  -  Assess patient's ability to carry out ADLs; assess patient's baseline for ADL function and identify physical deficits which impact ability to perform ADLs (bathing, care of mouth/teeth, toileting, grooming, dressing, etc.)  - Assess/evaluate cause of self-care deficits   - Assess range of motion  - Assess patient's mobility; develop plan if impaired  - Assess patient's need for assistive devices and provide as appropriate  - Encourage maximum independence but intervene and supervise when necessary  - Involve family in performance of ADLs  - Assess for home care needs following discharge   - Consider OT consult to assist with ADL evaluation and planning for discharge  - Provide patient education as appropriate  Outcome: Progressing     Problem: DISCHARGE PLANNING  Goal: Discharge to home or other facility with appropriate resources  Description: INTERVENTIONS:  - Identify barriers to discharge w/patient and caregiver  - Arrange for needed discharge resources and transportation as appropriate  - Identify discharge learning needs (meds, wound care, etc.)  - Arrange for interpretive services to assist  at discharge as needed  - Refer to Case Management Department for coordinating discharge planning if the patient needs post-hospital services based on physician/advanced practitioner order or complex needs related to functional status, cognitive ability, or social support system  Outcome: Progressing     Problem: Knowledge Deficit  Goal: Patient/family/caregiver demonstrates understanding of disease process, treatment plan, medications, and discharge instructions  Description: Complete learning assessment and assess knowledge base.  Interventions:  - Provide teaching at level of understanding  - Provide teaching via preferred learning methods  Outcome: Progressing     Problem: GENITOURINARY - ADULT  Goal: Maintains or returns to baseline urinary function  Description: INTERVENTIONS:  - Assess urinary function  - Encourage oral fluids to ensure adequate hydration if ordered  - Administer IV fluids as ordered to ensure adequate hydration  - Administer ordered medications as needed  - Offer frequent toileting  - Follow urinary retention protocol if ordered  Outcome: Progressing  Goal: Absence of urinary retention  Description: INTERVENTIONS:  - Assess patient’s ability to void and empty bladder  - Monitor I/O  - Bladder scan as needed  - Discuss with physician/AP medications to alleviate retention as needed  - Discuss catheterization for long term situations as appropriate  Outcome: Progressing     Problem: MUSCULOSKELETAL - ADULT  Goal: Maintain or return mobility to safest level of function  Description: INTERVENTIONS:  - Assess patient's ability to carry out ADLs; assess patient's baseline for ADL function and identify physical deficits which impact ability to perform ADLs (bathing, care of mouth/teeth, toileting, grooming, dressing, etc.)  - Assess/evaluate cause of self-care deficits   - Assess range of motion  - Assess patient's mobility  - Assess patient's need for assistive devices and provide as appropriate  -  Encourage maximum independence but intervene and supervise when necessary  - Involve family in performance of ADLs  - Assess for home care needs following discharge   - Consider OT consult to assist with ADL evaluation and planning for discharge  - Provide patient education as appropriate  Outcome: Progressing  Goal: Maintain proper alignment of affected body part  Description: INTERVENTIONS:  - Support, maintain and protect limb and body alignment  - Provide patient/ family with appropriate education  Outcome: Progressing     Problem: Nutrition/Hydration-ADULT  Goal: Nutrient/Hydration intake appropriate for improving, restoring or maintaining nutritional needs  Description: Monitor and assess patient's nutrition/hydration status for malnutrition. Collaborate with interdisciplinary team and initiate plan and interventions as ordered.  Monitor patient's weight and dietary intake as ordered or per policy. Utilize nutrition screening tool and intervene as necessary. Determine patient's food preferences and provide high-protein, high-caloric foods as appropriate.     INTERVENTIONS:  - Monitor oral intake, urinary output, labs, and treatment plans  - Assess nutrition and hydration status and recommend course of action  - Evaluate amount of meals eaten  - Assist patient with eating if necessary   - Allow adequate time for meals  - Recommend/ encourage appropriate diets, oral nutritional supplements, and vitamin/mineral supplements  - Order, calculate, and assess calorie counts as needed  - Recommend, monitor, and adjust tube feedings and TPN/PPN based on assessed needs  - Assess need for intravenous fluids  - Provide specific nutrition/hydration education as appropriate  - Include patient/family/caregiver in decisions related to nutrition  Outcome: Progressing

## 2025-01-18 NOTE — DISCHARGE SUMMARY
Discharge Summary - Hospitalist   Name: Yao Tipton 84 y.o. male I MRN: [id number]  Unit/Bed#: 2 [facility] 219 A  Date of Admission: 1/14/2025   Date of Service: 1/18/2025 I Hospital Day: 3     Assessment & Plan  Generalized weakness  Patient presented to ED with generalized weakness. Son reports patient saw PCP last week for ear pain/fatigue. Had recent victor removal on 01/08 in urology office.  Noted to be hypothermic, tachcyardic, and tachypneic on arrival to ED with abnormal UA  Likely in setting of UTI, continue treatment as below  PT/OT eval and treatment, recommending STR, will be discharged to [facility] ([facility])  Evidenced by hypothermia, tachypnea, tachycardia in setting of UTI  Patient initially hypothermic, normothermic after application of Ruben hugger  CXR negative for source of infection  COVID/flu/RSV negative  Procalcitonin mildly elevated though nonspecific in setting of CKD  Lactate negative  UA positive for leukocytes, WBC, bacteria  Blood cultures negative   Urine culture - proteus mirabilis susceptible to cefazolin   Completed 4 days rocephin, transition to keflex on discharge  Has remained afebrile without leukocytosis, hemodynamically stable  Urinary tract infection  Victor recently removed 01/08  UA positive for leukocytes, WBC, bacteria  Urine culture >100,000 cfu/ml proteus mirabilis susceptible to cefazolin   Completed 4 days rocephin, transition to keflex on discharge  Chronic combined systolic and diastolic CHF (congestive heart failure) (Formerly [facility])  Wt Readings from Last 3 Encounters:   01/14/25 69.9 kg (154 lb 1.6 oz)   01/06/25 70.3 kg (155 lb)   12/30/24 69.9 kg (154 lb)     ECHO 12/2024: EF 45%, systolic function mildly reduced, diastolic function abnormal  CXR: Layering small bilateral pleural effusions with probable adjacent parenchymal atelectasis, similar to prior study. Pulmonary vascular congestion.  , lower than previous values  Weight stable  Appears euvolemic  Son  states patient had weight gain last week and was instructed by his PCP to take torsemide 60 mg twice daily x 3 days which he completed day prior to admission   Continue home torsemide 40 mg bid  Monitor Is/Os and daily weights  Salt and fluid restriction  Essential hypertension  Continue home torsemide 40 mg bid  Diabetes mellitus with peripheral artery disease (HCC)  Lab Results   Component Value Date    HGBA1C 7.7 (H) 12/07/2024       Recent Labs     01/17/25  1121 01/17/25  1556 01/17/25  2101 01/18/25  0728   POCGLU 228* 327* 389* 111       Blood Sugar Average: Last 72 hrs:  (P) 150.6875  Patient with intermittent hypoglycemia due to poor oral intake, likely in setting of infection  Oral intake now improved, sugars elevated/stable  Continue glipizide on discharge and continue to monitor sugars at STR  Chronic kidney disease, stage 4 (severe) (HCC)  Lab Results   Component Value Date    EGFR 21 01/18/2025    EGFR 20 01/17/2025    EGFR 19 01/16/2025    CREATININE 2.60 (H) 01/18/2025    CREATININE 2.68 (H) 01/17/2025    CREATININE 2.75 (H) 01/16/2025   Follows with Dr. Guadarrama outpatient  Baseline Cr 2.5-3.0 per outpatient nephrology note  Currently stable at 2.60  Continue outpatient follow up with nephrology  Repeat bmp in 1 week at rehab  Chronic atrial fibrillation (HCC)  Continue eliquis 2.5 mg bid  Not on AV donnie blocker due to bradycardia  BPH (benign prostatic hyperplasia)  Continue flomax  Dupont inserted last admission for urinary retention, recently removed in urology office 01/08  Urinary retention protocol     Medical Problems       Resolved Problems  Date Reviewed: 1/18/2025   None       Discharging Physician / Practitioner: Katrina Lobo PA-C  PCP: Nicho Baltazar DO  Admission Date:   Admission Orders (From admission, onward)       Ordered        01/15/25 0749  INPATIENT ADMISSION  Once            01/14/25 1549  Place in Observation  Once                          Discharge Date:  01/18/25    Consultations During Hospital Stay:  none    Procedures Performed:   none    Significant Findings / Test Results:   CXR: Layering small bilateral pleural effusions with probable adjacent parenchymal atelectasis, similar to prior study. Pulmonary vascular congestion. Unchanged cardiomegaly.  Urine culture: >100,000 cfu/ml Proteus mirabilis    Incidental Findings:   none    Test Results Pending at Discharge (will require follow up):   none     Outpatient Tests Requested:  Repeat bmp 1 week at rehab    Complications:  none    Reason for Admission: fatigue, sepsis    Hospital Course:   Yao Tipton is a 84 y.o. male patient who originally presented to the hospital on 1/14/2025 due to generalized weakness and fatigue for about 4 to 5 days.  Son reports his Dupont catheter was recently removed on 1/8 at urology office then noted cloudy urine.  Patient met sepsis criteria on admission in setting of UTI.  Urine culture grew Proteus and he completed several days of Rocephin and will be transition to Keflex on discharge.  Patient has remained afebrile and hemodynamically stable without leukocytosis.  Patient did experience intermittent episodes of hypoglycemia secondary to poor oral intake due to infection.  Appetite has now improved and sugars have been stable/slightly elevated.  Patient was seen by PT/OT and recommended short-term rehab.  He will be discharged to [facility].    Please see above list of diagnoses and related plan for additional information.     Condition at Discharge: stable    Discharge Day Visit / Exam:   Subjective:  Patient resting in bed comfortably.  He denies any current complaints and asks when he can go home.  Per discussion with RN and chart review, he ate all of his breakfast yesterday and some of his lunch and dinner.  Son reports he has poor appetite at baseline.  Patient denies fever, chills, chest pain, shortness of breath, nausea, vomiting, or abdominal pain.  Vitals:  Blood Pressure: 114/60 (01/18/25 1024)  Pulse: 86 (01/18/25 1024)  Temperature: 97.7 °F (36.5 °C) (01/18/25 0848)  Temp Source: Oral (01/18/25 0848)  Respirations: 18 (01/18/25 0848)  Weight - Scale: 69.9 kg (154 lb 1.6 oz) (01/14/25 1318)  SpO2: 99 % (01/18/25 1024)  Physical Exam  Vitals and nursing note reviewed.   Constitutional:       General: He is not in acute distress.     Appearance: He is well-developed.   Cardiovascular:      Rate and Rhythm: Normal rate and regular rhythm.      Heart sounds: Normal heart sounds.   Pulmonary:      Effort: Pulmonary effort is normal. No respiratory distress.      Breath sounds: Decreased breath sounds present.   Abdominal:      General: Bowel sounds are normal.      Palpations: Abdomen is soft.      Tenderness: There is no abdominal tenderness.   Musculoskeletal:         General: No swelling.   Skin:     General: Skin is warm and dry.   Neurological:      General: No focal deficit present.      Mental Status: He is alert. Mental status is at baseline.   Psychiatric:         Mood and Affect: Mood normal.          Discussion with Family: Updated  (son) via phone.    Discharge instructions/Information to patient and family:   See after visit summary for information provided to patient and family.      Provisions for Follow-Up Care:  See after visit summary for information related to follow-up care and any pertinent home health orders.      Mobility at time of Discharge:   Basic Mobility Inpatient Raw Score: 14  JH-HLM Goal: 4: Move to chair/commode  JH-HLM Achieved: 2: Bed activities/Dependent transfer  HLM Goal NOT achieved. Continue to encourage mobility in post discharge setting.     Disposition:   Other Skilled Nursing Facility at [facility]    Planned Readmission: none    Discharge Medications:  See after visit summary for reconciled discharge medications provided to patient and/or family.      Administrative Statements   Discharge Statement:  I have spent a  total time of 45 minutes in caring for this patient on the day of the visit/encounter. >30 minutes of time was spent on: Diagnostic results, Risks and benefits of tx options, Instructions for management, Patient and family education, Importance of tx compliance, Counseling / Coordination of care, Documenting in the medical record, Reviewing / ordering tests, medicine, procedures  , and Communicating with other healthcare professionals .    **Please Note: This note may have been constructed using a voice recognition system**

## 2025-01-18 NOTE — ASSESSMENT & PLAN NOTE
Lab Results   Component Value Date    HGBA1C 7.7 (H) 12/07/2024       Recent Labs     01/17/25  1121 01/17/25  1556 01/17/25  2101 01/18/25  0728   POCGLU 228* 327* 389* 111       Blood Sugar Average: Last 72 hrs:  (P) 150.6875  Patient with intermittent hypoglycemia due to poor oral intake, likely in setting of infection  Oral intake now improved, sugars elevated/stable  Continue glipizide on discharge and continue to monitor sugars at STR

## 2025-01-18 NOTE — NURSING NOTE
Attempted too call report 3 times. Was left on hold for 15 mins. Left information with  Carin and gave call back information.    1357: shell called from [facility], report given.

## 2025-01-18 NOTE — PLAN OF CARE
Problem: SAFETY ADULT  Goal: Patient will remain free of falls  Description: INTERVENTIONS:  - Educate patient/family on patient safety including physical limitations  - Instruct patient to call for assistance with activity   - Consult OT/PT to assist with strengthening/mobility   - Keep Call bell within reach  - Keep bed low and locked with side rails adjusted as appropriate  - Keep care items and personal belongings within reach  - Initiate and maintain comfort rounds  - Make Fall Risk Sign visible to staff  - Offer Toileting every 2 Hours, in advance of need  - Initiate/Maintain bed/chair alarm  - Obtain necessary fall risk management equipment: fall risk sign  - Apply yellow socks and bracelet for high fall risk patients  - Consider moving patient to room near nurses station  Outcome: Progressing     Problem: SAFETY ADULT  Goal: Maintain or return to baseline ADL function  Description: INTERVENTIONS:  -  Assess patient's ability to carry out ADLs; assess patient's baseline for ADL function and identify physical deficits which impact ability to perform ADLs (bathing, care of mouth/teeth, toileting, grooming, dressing, etc.)  - Assess/evaluate cause of self-care deficits   - Assess range of motion  - Assess patient's mobility; develop plan if impaired  - Assess patient's need for assistive devices and provide as appropriate  - Encourage maximum independence but intervene and supervise when necessary  - Involve family in performance of ADLs  - Assess for home care needs following discharge   - Consider OT consult to assist with ADL evaluation and planning for discharge  - Provide patient education as appropriate  Outcome: Progressing     Problem: DISCHARGE PLANNING  Goal: Discharge to home or other facility with appropriate resources  Description: INTERVENTIONS:  - Identify barriers to discharge w/patient and caregiver  - Arrange for needed discharge resources and transportation as appropriate  - Identify  discharge learning needs (meds, wound care, etc.)  - Arrange for interpretive services to assist at discharge as needed  - Refer to Case Management Department for coordinating discharge planning if the patient needs post-hospital services based on physician/advanced practitioner order or complex needs related to functional status, cognitive ability, or social support system  Outcome: Progressing

## 2025-01-19 LAB
BACTERIA BLD CULT: NORMAL
BACTERIA BLD CULT: NORMAL

## 2025-02-13 PROBLEM — A41.9 SEPSIS (HCC): Status: RESOLVED | Noted: 2023-07-29 | Resolved: 2025-02-13

## 2025-02-13 PROBLEM — N39.0 URINARY TRACT INFECTION: Status: RESOLVED | Noted: 2025-01-14 | Resolved: 2025-02-13

## 2025-02-25 PROBLEM — Z98.890: Status: ACTIVE | Noted: 2019-01-13

## 2025-02-25 PROBLEM — R11.2 NAUSEA AND VOMITING: Status: ACTIVE | Noted: 2025-01-01

## 2025-02-25 NOTE — ED PROVIDER NOTES
Time reflects when diagnosis was documented in both MDM as applicable and the Disposition within this note       Time User Action Codes Description Comment    2/25/2025  7:54 PM Ed Soto Add [I95.9] Hypotension     2/25/2025  7:54 PM Ed Soto Add [E86.0] Dehydration           ED Disposition       ED Disposition   Admit    Condition   Stable    Date/Time   Tue Feb 25, 2025  7:54 PM    Comment   Case was discussed with critical care and the patient's admission status was agreed to be Admission Status: inpatient status to the service of Dr. Goodman .               Assessment & Plan       Medical Decision Making  Patient presenting with decreased appetite, questionable nausea and vomiting.  Patient chronically ill-appearing but nondistressed at time of initial evaluation, soft blood pressures, denies any localizing symptoms, denies abdominal pain or urinary symptoms, cough, shortness of breath, chest pain. I ordered and reviewed initial lab work including CBC, CMP, BNP, troponin, and a COVID and flu swab which were all unremarkable.  Patient hypothermic with multiple additional bouts of hypotension prompting further lab workup including lactate, procalcitonin which were grossly unremarkable.  Patient initially unable to urinate.  Patient treated with multiple boluses of fluid.  Ultimately urine sample obtained with large leukocytes and large occult blood.  Treated with broad-spectrum antibiotics.  Given his recurrent bouts of hypotension, I discussed patient with the critical care team who accepted patient to their team.    Amount and/or Complexity of Data Reviewed  External Data Reviewed: notes.     Details: Patient admitted from 1/14/2025 to 1/18/2025 for sepsis believed to be secondary to a UTI.  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  Noted to be on torsemide.  Labs: ordered.  Radiology: ordered.    Risk  Prescription drug management.  Decision regarding  hospitalization.      Critical Care Time Statement: Upon my evaluation, this patient had a high probability of imminent or life-threatening deterioration due to shock, which required my direct attention, intervention, and personal management.  I spent a total of 180 minutes directly providing critical care services, including evaluating for the presence of life-threatening injuries or illnesses, management of organ system failure(s) , and multiple fluid boluses for hypotension . This time is exclusive of procedures, teaching, treating other patients, family meetings, and any prior time recorded by providers other than myself.          Medications   sodium chloride 0.9 % bolus 1,000 mL (1,000 mL Intravenous New Bag 2/25/25 1912)   norepinephrine (LEVOPHED) 4 mg (STANDARD CONCENTRATION) IV in sodium chloride 0.9% 250 mL (0 mcg/min Intravenous Hold 2/25/25 1939)   sodium chloride 0.9 % bolus 500 mL (0 mL Intravenous Stopped 2/25/25 1618)   hydrocortisone (Solu-CORTEF) injection 50 mg (50 mg Intravenous Given 2/25/25 1738)   sodium chloride 0.9 % bolus 1,000 mL (0 mL Intravenous Stopped 2/25/25 1914)   cefepime (MAXIPIME) IVPB (premix in dextrose) 2,000 mg 50 mL (0 mg Intravenous Stopped 2/25/25 1806)       ED Risk Strat Scores                                                History of Present Illness       Chief Complaint   Patient presents with    Hypotension     Pt brought by ALS, EMS was initially called for AMS. Pt is awake and alert to his baseline.  Nursing home reports decreased PO intake x3 days, hypotensive for EMS 85/40       Past Medical History:   Diagnosis Date    Atrial fibrillation (HCC)     BPH (benign prostatic hyperplasia)     CHF (congestive heart failure) (HCC)     Chronic kidney disease     Coronary artery disease     Diabetes mellitus (HCC)     Hyperlipidemia     Hypertension     Hyponatremia 12/07/2024      Past Surgical History:   Procedure Laterality Date    CARDIAC CATHETERIZATION N/A 6/30/2023     Procedure: Cardiac pericardiocentesis;  Surgeon: Shanna Adame DO;  Location: [facility];  Service: Cardiology    CARDIAC CATHETERIZATION N/A 6/30/2023    Procedure: Cardiac RHC;  Surgeon: Shanna Adame DO;  Location: [facility];  Service: Cardiology    EYE SURGERY      IR CHEST TUBE PLACEMENT  6/24/2023    IR CHEST TUBE PLACEMENT  7/28/2023    IR PLEURAL EFFUSION LONG-TERM CATHETER PLACEMENT  8/7/2023    IR PLEURAL EFFUSION LONG-TERM CATHETER REMOVAL  1/3/2024    IR THORACENTESIS  12/11/2023    SKIN CANCER EXCISION      TONSILLECTOMY        Family History   Problem Relation Age of Onset    Heart disease Mother     Diabetes Father     Vision loss Father     Cancer Sister     Diabetes Sister       Social History     Tobacco Use    Smoking status: Never    Smokeless tobacco: Former    Tobacco comments:     Smoked a pipe 50 years ago   Vaping Use    Vaping status: Never Used   Substance Use Topics    Alcohol use: Not Currently     Alcohol/week: 0.0 standard drinks of alcohol     Comment: special occasions    Drug use: Not Currently      E-Cigarette/Vaping    E-Cigarette Use Never User       E-Cigarette/Vaping Substances    Nicotine No     THC No     CBD No     Flavoring No     Other No     Unknown No       I have reviewed and agree with the history as documented.     Patient is an 84-year-old male history of atrial fibrillation, CHF, CKD, diabetes, hypertension, hyperlipidemia presenting for evaluation of decreased appetite, weakness.  Patient noted to be hypotensive into the 80s per EMS.  Patient acknowledges poor appetite but denies nausea, vomiting, diarrhea, fevers, chills, headache, rhinorrhea, sore throat.  Patient does have a mild cough, states that it has been nonproductive.        Review of Systems   Constitutional:  Positive for appetite change and fatigue. Negative for chills and fever.   Respiratory:  Positive for cough. Negative for shortness of breath and wheezing.     Gastrointestinal:  Negative for diarrhea and nausea.   Musculoskeletal:  Negative for arthralgias and myalgias.   Neurological:  Negative for headaches.   Psychiatric/Behavioral:  Negative for confusion.    All other systems reviewed and are negative.          Objective       ED Triage Vitals   Temperature Pulse Blood Pressure Respirations SpO2 Patient Position - Orthostatic VS   02/25/25 1408 02/25/25 1408 02/25/25 1408 02/25/25 1408 02/25/25 1408 02/25/25 1900   (!) 95.2 °F (35.1 °C) 65 97/53 18 97 % Lying      Temp Source Heart Rate Source BP Location FiO2 (%) Pain Score    02/25/25 1408 02/25/25 1500 02/25/25 1500 -- --    Temporal Monitor Left arm        Vitals      Date and Time Temp Pulse SpO2 Resp BP Pain Score FACES Pain Rating User   02/25/25 1945 -- 77 98 % 30 110/56 -- -- AM   02/25/25 1930 -- 81 95 % 34 124/57 -- -- AM   02/25/25 1915 -- 74 98 % 20 83/51 -- -- LL   02/25/25 1900 -- 69 99 % 22 117/93 -- -- AM   02/25/25 1845 -- 65 98 % 16  72/42 -- -- CS   02/25/25 1830 -- 68 98 % 20 85/53 -- -- CS   02/25/25 1815 -- 69 98 % 20 97/51 -- -- CS   02/25/25 1800 -- 61 98 % 19 90/44 -- -- CS   02/25/25 1745 -- 62 98 % 17 84/45 -- -- CS   02/25/25 1715 -- 68 98 % 18 78/46 -- -- CS   02/25/25 1630 -- 66 98 % 16 92/46 -- -- CS   02/25/25 1618 91.8 °F (33.2 °C) -- -- -- 89/46 -- -- CS   02/25/25 1600 -- 67 100 % 16  81/42 -- -- CS   02/25/25 1530 -- 75 97 % 27 104/50 -- -- CS   02/25/25 1500 -- 63 91 % 18 94/53 -- -- CS   02/25/25 1408 -- 65 97 % 18 97/53 -- --    02/25/25 1408 95.2 °F (35.1 °C) -- -- -- -- -- -- CS            Physical Exam  Vitals and nursing note reviewed.   Constitutional:       General: He is not in acute distress.     Appearance: Normal appearance. He is not ill-appearing, toxic-appearing or diaphoretic.      Comments: Chronically ill-appearing but nontoxic nondistressed   HENT:      Head: Normocephalic and atraumatic.      Comments: Moist mucous membranes     Right Ear: External ear  normal.      Left Ear: External ear normal.   Eyes:      General:         Right eye: No discharge.         Left eye: No discharge.   Cardiovascular:      Comments: Regular rate and rhythm, no murmurs rubs or gallops.  Extremities warm and well-perfused without mottling  Pulmonary:      Effort: No respiratory distress.      Comments: No increased work of breathing.  Speaking in complete sentences.  Lungs clear to auscultation bilaterally without wheezes, rales, rhonchi.  Satting 97% on room air indicating adequate oxygenation  Abdominal:      General: There is no distension.      Comments: Abdomen nondistended with normal bowel sounds.  Nontender without rigidity, rebound, guarding   Musculoskeletal:         General: No deformity.      Cervical back: Normal range of motion.   Skin:     Findings: No lesion or rash.   Neurological:      Mental Status: He is alert and oriented to person, place, and time. Mental status is at baseline.      Comments: Very hard of hearing but awake, alert, fully oriented.  Moving all extremities equally.   Psychiatric:         Mood and Affect: Mood and affect normal.         Results Reviewed       Procedure Component Value Units Date/Time    Urinalysis with microscopic [[id number]]  (Abnormal) Collected: 02/25/25 1942    Lab Status: Final result Specimen: Urine, Straight Cath Updated: 02/25/25 2000     Color, UA Yellow     Clarity, UA Clear     Specific Gravity, UA 1.020     pH, UA 6.0     Leukocytes, UA Large     Nitrite, UA Negative     Protein,  (2+) mg/dl      Glucose, UA Negative mg/dl      Ketones, UA Negative mg/dl      Urobilinogen, UA <2.0 mg/dl      Bilirubin, UA Negative     Occult Blood, UA Large     RBC, UA       Field obscured, unable to enumerate     /hpf     WBC, UA Innumerable /hpf      Epithelial Cells       Field obscured, unable to enumerate     /hpf     Bacteria, UA       Field obscured, unable to enumerate     /hpf    Stool Enteric Bacterial Panel by PCR  [[id number]] Collected: 02/25/25 1942    Lab Status: In process Specimen: Stool from Per Rectum Updated: 02/25/25 1952    Clostridium difficile toxin by PCR with EIA [[id number]] Collected: 02/25/25 1942    Lab Status: In process Specimen: Stool from Per Rectum Updated: 02/25/25 1951    Blood gas, venous [[id number]]  (Abnormal) Collected: 02/25/25 1942    Lab Status: Final result Specimen: Blood from Arm, Right Updated: 02/25/25 1951     pH, Aravind 7.384     pCO2, Aravind 40.4 mm Hg      pO2, Aravind 62.1 mm Hg      HCO3, Aravind 23.6 mmol/L      Base Excess, Aravind -1.4 mmol/L      O2 Content, Aravind 11.9 ml/dL      O2 HGB, VENOUS 88.6 %     Ammonia [[id number]] Collected: 02/25/25 1942    Lab Status: In process Specimen: Blood from Arm, Right Updated: 02/25/25 1948    Cortisol [[id number]]     Lab Status: No result Specimen: Blood     TSH, 3rd generation with Free T4 reflex [[id number]]  (Abnormal) Collected: 02/25/25 1741    Lab Status: Final result Specimen: Blood from Line, Venous Updated: 02/25/25 1823     TSH 3RD GENERATON 9.569 uIU/mL     T4, free [[id number]] Collected: 02/25/25 1741    Lab Status: In process Specimen: Blood from Line, Venous Updated: 02/25/25 1823    Procalcitonin [[id number]]  (Abnormal) Collected: 02/25/25 1732    Lab Status: Final result Specimen: Blood from Arm, Left Updated: 02/25/25 1816     Procalcitonin 0.34 ng/ml     Lactic acid, plasma (w/reflex if result > 2.0) [[id number]]  (Normal) Collected: 02/25/25 1732    Lab Status: Final result Specimen: Blood from Arm, Left Updated: 02/25/25 1756     LACTIC ACID 0.8 mmol/L     Narrative:      Result may be elevated if tourniquet was used during collection.    Blood culture [[id number]] Collected: 02/25/25 1732    Lab Status: In process Specimen: Blood from Arm, Left Updated: 02/25/25 1736    Blood culture [[id number]] Collected: 02/25/25 1726    Lab Status: In process Specimen: Blood from Arm, Right Updated: 02/25/25 1736    HS Troponin I 2hr [[id number]]  (Normal)  Collected: 02/25/25 1638    Lab Status: Final result Specimen: Blood from Line, Venous Updated: 02/25/25 1710     hs TnI 2hr 17 ng/L      Delta 2hr hsTnI 0 ng/L     CBC and differential [[id number]]  (Abnormal) Collected: 02/25/25 1430    Lab Status: Final result Specimen: Blood from Arm, Right Updated: 02/25/25 1516     WBC 5.62 Thousand/uL      RBC 3.03 Million/uL      Hemoglobin 9.7 g/dL      Hematocrit 28.9 %      MCV 95 fL      MCH 32.0 pg      MCHC 33.6 g/dL      RDW 17.3 %      MPV 10.8 fL      Platelets 35 Thousands/uL      nRBC 1 /100 WBCs      Segmented % 79 %      Immature Grans % 1 %      Lymphocytes % 11 %      Monocytes % 8 %      Eosinophils Relative 1 %      Basophils Relative 0 %      Absolute Neutrophils 4.46 Thousands/µL      Absolute Immature Grans 0.05 Thousand/uL      Absolute Lymphocytes 0.61 Thousands/µL      Absolute Monocytes 0.46 Thousand/µL      Eosinophils Absolute 0.03 Thousand/µL      Basophils Absolute 0.01 Thousands/µL     Narrative:      This is an appended report.  These results have been appended to a previously verified report.    Smear Review(Phlebs Do Not Order) [[id number]]  (Abnormal) Collected: 02/25/25 1430    Lab Status: Final result Specimen: Blood from Arm, Right Updated: 02/25/25 1516     RBC Morphology Present     Platelet Estimate Decreased     Acanthocytes Present     Anisocytosis Present     [place] Cells Present     Macrocytes Present     Ovalocytes Present    HS Troponin 0hr (reflex protocol) [[id number]]  (Normal) Collected: 02/25/25 1430    Lab Status: Final result Specimen: Blood from Arm, Right Updated: 02/25/25 1502     hs TnI 0hr 17 ng/L     B-Type Natriuretic Peptide(BNP) [[id number]]  (Abnormal) Collected: 02/25/25 1430    Lab Status: Final result Specimen: Blood from Arm, Right Updated: 02/25/25 1502      pg/mL     FLU/COVID Rapid Antigen (30 min. TAT) - Preferred screening test in ED [[id number]]  (Normal) Collected: 02/25/25 1430    Lab Status: Final  result Specimen: Nares from Nose Updated: 02/25/25 1458     SARS COV Rapid Antigen Negative     Influenza A Rapid Antigen Negative     Influenza B Rapid Antigen Negative    Narrative:      This test has been performed using the BackTrack Maura 2 FLU+SARS Antigen test under the Emergency Use Authorization (EUA). This test has been validated by the  and verified by the performing laboratory. The Maura uses lateral flow immunofluorescent sandwich assay to detect SARS-COV, Influenza A and Influenza B Antigen.     The Quidel Maura 2 SARS Antigen test does not differentiate between SARS-CoV and SARS-CoV-2.     Negative results are presumptive and may be confirmed with a molecular assay, if necessary, for patient management. Negative results do not rule out SARS-CoV-2 or influenza infection and should not be used as the sole basis for treatment or patient management decisions. A negative test result may occur if the level of antigen in a sample is below the limit of detection of this test.     Positive results are indicative of the presence of viral antigens, but do not rule out bacterial infection or co-infection with other viruses.     All test results should be used as an adjunct to clinical observations and other information available to the provider.    FOR PEDIATRIC PATIENTS - copy/paste COVID Guidelines URL to browser: [email or web address]    Comprehensive metabolic panel [[id number]]  (Abnormal) Collected: 02/25/25 1430    Lab Status: Final result Specimen: Blood from Arm, Right Updated: 02/25/25 1456     Sodium 133 mmol/L      Potassium 4.7 mmol/L      Chloride 99 mmol/L      CO2 22 mmol/L      ANION GAP 12 mmol/L       mg/dL      Creatinine 3.78 mg/dL      Glucose 156 mg/dL      Calcium 7.6 mg/dL      Corrected Calcium 8.7 mg/dL      AST 26 U/L      ALT 23 U/L      Alkaline Phosphatase 130 U/L      Total Protein 5.0 g/dL      Albumin 2.6 g/dL       Total Bilirubin 0.66 mg/dL      eGFR 13 ml/min/1.73sq m     Narrative:      National Kid[employer] guidelines for Chronic Kidney Disease (CKD):     Stage 1 with normal or high GFR (GFR > 90 mL/min/1.73 square meters)    Stage 2 Mild CKD (GFR = 60-89 mL/min/1.73 square meters)    Stage 3A Moderate CKD (GFR = 45-59 mL/min/1.73 square meters)    Stage 3B Moderate CKD (GFR = 30-44 mL/min/1.73 square meters)    Stage 4 Severe CKD (GFR = 15-29 mL/min/1.73 square meters)    Stage 5 End Stage CKD (GFR <15 mL/min/1.73 square meters)  Note: GFR calculation is accurate only with a steady state creatinine            XR chest 1 view portable   Final Interpretation by Fred Esquivel MD ( [id number])      Enlarged cardiac silhouette. Pulmonary vascular congestion.      Small bilateral pleural effusions.      Findings appear similar to 2025 radiographs.      Workstation performed: [id number]         CT abdomen pelvis wo contrast    (Results Pending)       Procedures    ED Medication and Procedure Management   Prior to Admission Medications   Prescriptions Last Dose Informant Patient Reported? Taking?   ALPRAZolam (XANAX) 0.5 mg tablet  Child Yes No   Si.5 mg daily at bedtime as needed for anxiety or sleep   Blood Pressure Monitor NILTON  Child No No   Sig: by Does not apply route daily   Sure Comfort Pen Needles 32G X 4 MM MISC  Child Yes No   ZINC OXIDE PO  Child Yes No   Sig: Take by mouth daily   allopurinol (ZYLOPRIM) 100 mg tablet  Child Yes No   Sig: Take 100 mg by mouth 2 (two) times a day   apixaban (Eliquis) 2.5 mg  Child No No   Sig: Take 1 tablet (2.5 mg total) by mouth 2 (two) times a day   ascorbic acid (VITAMIN C) 500 MG tablet  Child No No   Sig: Take 1 tablet (500 mg total) by mouth daily   atorvastatin (LIPITOR) 40 mg tablet  Child No No   Sig: Take 1 tablet (40 mg total) by mouth daily with dinner   cholecalciferol (VITAMIN D3) 1,000 units tablet  Child No No   Sig: Take 2 tablets (2,000  Units total) by mouth daily Do not start before July 12, 2023.   docusate sodium (COLACE) 100 mg capsule  Child No No   Sig: Take 1 capsule (100 mg total) by mouth 2 (two) times a day as needed for constipation   famotidine (PEPCID) 10 mg tablet  Child No No   Sig: Take 1 tablet (10 mg total) by mouth daily at bedtime   ferrous sulfate 325 (65 Fe) mg tablet  Child Yes No   Sig: Take 325 mg by mouth daily with breakfast   glimepiride (AMARYL) 4 mg tablet   Yes No   Sig: Take 4 mg by mouth daily with dinner   halobetasol (ULTRAVATE) 0.05 % cream  Child Yes No   insulin aspart (NovoLOG FlexPen) 100 UNIT/ML injection pen  Child Yes No   latanoprost (XALATAN) 0.005 % ophthalmic solution  Child Yes No   Sig: Administer 1 drop to both eyes daily at bedtime   tamsulosin (FLOMAX) 0.4 mg  Child Yes No   Sig: Take 0.4 mg by mouth daily with dinner   timolol (TIMOPTIC) 0.5 % ophthalmic solution  Child No No   Sig: Administer 1 drop to both eyes daily   torsemide (DEMADEX) 20 mg tablet  Child No No   Sig: Take 2 tablets (40 mg total) by mouth 2 (two) times a day      Facility-Administered Medications: None     Patient's Medications   Discharge Prescriptions    No medications on file     No discharge procedures on file.  ED SEPSIS DOCUMENTATION   Time reflects when diagnosis was documented in both MDM as applicable and the Disposition within this note       Time User Action Codes Description Comment    2/25/2025  7:54 PM Ed Soto [I95.9] Hypotension     2/25/2025  7:54 PM Ed Soto [E86.0] Dehydration                  Ed Soto MD  02/25/25 2007

## 2025-02-25 NOTE — ED NOTES
Patients temp 91.8. provider aware and ordered to place yola hugger on patient. Yola hugger applied.     Brenda Lugo RN  02/25/25 [id number]

## 2025-02-26 NOTE — ASSESSMENT & PLAN NOTE
Chronic afib on eliquis  Not on rate control at this time  Currently HR int he 60s  Hold eliquis secondary to thrombocytopenia

## 2025-02-26 NOTE — WOUND OSTOMY CARE
Consult Note - Wound   Yao Tipton 84 y.o. male MRN: [id number]  Unit/Bed#: ICU 05 Encounter: [id number]        History and Present Illness:  Patient is an 83 yo male that was admitted to [facility] for treatment of shock. Patient has a PMH of HF, a-fib, CKD, HLD.  Patient is a moderate assist for turning and repositioning. Patient is incontinent of bowel and bladder. On assessment, patient is seen lying on critical care low air loss mattress.  Currently NPO at time of exam.     Wound Care was consulted for multiple wounds     Assessment Findings:   B/L heels are dry intact and princess with no skin loss or wounds present. Recommend preventative Hydraguard Cream and proper offloading/ repositioning.  Offloading boots in place.       POA B/L Sacro-Buttocks/Ramandeep-Rectal Area DTPI: irregular in shape area of purple intact non-blanchable tissue. Ramandeep-wound is intact and hyperpigmented. No skin loss or drainage noted. Recommend hydraguard cream to the area.   POA Left Lateral Thigh/Hip DTPI: scattered in shape areas of intact irregular in shape/ linear in shape areas of dark purple non-blanchable tissue measured together. Vascular vs pressure in etiology. No skin loss or drainage noted. Recommend hdyraguard. Right hip is intact, pink in color and blanchable.   Right Foot 2nd Toe: likely vascular in etiology. Irregular in shape area of intact brown/black tissue noted on tip of toe. No skin loss or drainage noted. Recommend betadine paint to area.   Left ear and anterior nose with intact, dry brown scabs. No skin loss or drainage noted. Recommend leaving MONSE.   Right Lateral Pretibial Leg Wound: fluid overload vs vascular in etiology. Oval in shape area of partial thickness skin loss. Wound bed with red and yellow in color tissue. Ramandeep-wound is fragile and hyperpigmented. Scant serosanguineous drainage noted. Adaptic and foam dressing applied.   Left Medial Pretibial Leg: intact, circular in shape fluid filled,  pink/light purple in color blister. No skin loss or drainage noted. Ramandeep-wound area is fragile and hyperpigmented. Recommend 3m adaptic and foam dressing.       Deep Tissue Pressure Injury wounds have the potential to evolve into unstageable, stage III or stage IV wounds.        No induration, fluctuance, odor, warmth/temperature differences, redness, or purulence noted to the above noted wounds and skin areas assessed. New dressings applied per orders listed below. Patient tolerated well- no s/s of non-verbal pain or discomfort observed during the encounter. Bedside nurse aware of plan of care. See flow sheets for more detailed assessment findings.      Orders listed below and wound care will continue to follow, call or Secure Chat Message with questions.     Skin Care Plan:  1-B/L Sacro-Buttocks, Ramandeep-rectal Area, B/L Hips/Lateral Thighs: apply hydraguard to areas for treatment BID and PRN episodes of incontinence.   2-Turn/reposition q2h or when medically stable for pressure re-distribution on skin. Utilize ATR turning and repositioning system   3-Elevate heels to offload pressure. Apply offloading boots to b/l feet.   4-Moisturize skin daily with skin nourishing cream  5-Ehob cushion in chair when out of bed.  6-Preventative Hydraguard to bilateral heels BID and PRN.   7-[place] Right Foot 2nd Toe Eschar with Betadine daily.   8-B/L Pretibial Leg Wounds: Cleanse with NSS and pat dry. Apply 3m no sting skin prep to left pretibial leg blister and allow to dry. Apply adaptic to blister and open wound and cover both with silicone bordered foam dressings. Jaleel with T for Treatment and change every other day or PRN  9-Nursing communication: please order patient a P-500 low air loss mattress when transferred out of the ICU.       Wounds:  Wound 12/08/24 Leg Right;Anterior;Distal (Active)   Wound Image   02/26/25 0975   Wound Description Beefy red;Yellow 02/26/25 0941   Ramandeep-wound Assessment Fragile;Hyperpigmented  02/26/25 0941   Wound Length (cm) 1.5 cm 02/26/25 0941   Wound Width (cm) 1 cm 02/26/25 0941   Wound Depth (cm) 0.1 cm 02/26/25 0941   Wound Surface Area (cm^2) 1.5 cm^2 02/26/25 0941   Wound Volume (cm^3) 0.15 cm^3 02/26/25 0941   Calculated Wound Volume (cm^3) 0.15 cm^3 02/26/25 0941   Change in Wound Size % -50 02/26/25 0941   Drainage Amount Scant 02/26/25 0941   Drainage Description Serosanguineous 02/26/25 0941   Non-staged Wound Description Partial thickness 02/26/25 0941   Treatments Cleansed;Irrigation with NSS;Site care 02/26/25 0941   Dressing Non adherent;Foam, Silicon (eg. Allevyn, etc) 02/26/25 0941   Wound packed? No 02/26/25 0941   Dressing Changed New 02/26/25 0941   Patient Tolerance Tolerated well 02/26/25 0941   Dressing Status Clean;Dry;Intact 02/26/25 0941       Wound 12/08/24 Leg Left;Medial;Distal (Active)   Wound Image   02/26/25 0945   Wound Description Light purple;Pink 02/26/25 0944   Ramandeep-wound Assessment Hyperpigmented;Fragile 02/26/25 0944   Wound Length (cm) 2.5 cm 02/26/25 0945   Wound Width (cm) 2 cm 02/26/25 0945   Wound Depth (cm) 0 cm 02/26/25 0945   Wound Surface Area (cm^2) 5 cm^2 02/26/25 0945   Wound Volume (cm^3) 0 cm^3 02/26/25 0945   Calculated Wound Volume (cm^3) 0 cm^3 02/26/25 0945   Change in Wound Size % 100 02/26/25 0945   Drainage Amount None 02/26/25 0944   Drainage Description Other (Comment) 02/26/25 0944   Non-staged Wound Description Not applicable 02/26/25 0944   Treatments Cleansed;Site care 02/26/25 0944   Dressing Non adherent;Foam, Silicon (eg. Allevyn, etc) 02/26/25 0944   Wound packed? No 02/26/25 0944   Dressing Changed New 02/26/25 0944   Patient Tolerance Tolerated well 02/26/25 0944   Dressing Status Clean;Intact 02/26/25 0944       Wound 12/09/24 Pressure Injury Nose (Active)   Wound Image   02/26/25 0944   Wound Description Intact;Dry;Brown 02/26/25 0944   Pressure Injury Stage O 02/26/25 0944   Drainage Amount None 02/26/25 0944   Non-staged Wound  Description Not applicable 02/26/25 0944   Treatments Cleansed;Site care 02/26/25 0944   Dressing Open to air 02/26/25 0944   Wound packed? No 02/26/25 0944   Dressing Changed Other (Comment) 02/26/25 0944   Patient Tolerance Tolerated well 02/26/25 0944   Dressing Status Other (Comment) 02/26/25 0944       Wound 01/16/25 Ear Left;Outer;Lateral (Active)   Wound Image   02/26/25 0944   Wound Description Intact;Dry;Brown 02/26/25 0951   Ramandeep-wound Assessment Intact 02/26/25 0951   Wound Length (cm) 0 cm 02/26/25 0951   Wound Width (cm) 0 cm 02/26/25 0951   Wound Depth (cm) 0 cm 02/26/25 0951   Wound Surface Area (cm^2) 0 cm^2 02/26/25 0951   Wound Volume (cm^3) 0 cm^3 02/26/25 0951   Calculated Wound Volume (cm^3) 0 cm^3 02/26/25 0951   Change in Wound Size % 100 02/26/25 0951   Non-staged Wound Description Not applicable 02/26/25 0951   Treatments Cleansed;Site care 02/26/25 0951   Dressing Other (Comment) 02/26/25 0951   Wound packed? No 02/26/25 0400   Dressing Changed New 02/26/25 0000   Patient Tolerance Tolerated well 02/26/25 0000   Dressing Status Clean;Dry;Intact 02/26/25 0400       Wound 02/25/25 Pressure Injury Sacrum Mid;Left;Right (Active)   Wound Image   02/26/25 0951   Wound Description Light purple;Non-blanchable erythema 02/26/25 0951   Pressure Injury Stage DTPI 02/26/25 0951   Ramandeep-wound Assessment Intact;Hyperpigmented 02/26/25 0951   Wound Length (cm) 6 cm 02/26/25 0951   Wound Width (cm) 8.5 cm 02/26/25 0951   Wound Depth (cm) 0 cm 02/26/25 0951   Wound Surface Area (cm^2) 51 cm^2 02/26/25 0951   Wound Volume (cm^3) 0 cm^3 02/26/25 0951   Calculated Wound Volume (cm^3) 0 cm^3 02/26/25 0951   Non-staged Wound Description Not applicable 02/26/25 0951   Treatments Cleansed;Site care 02/26/25 0951   Dressing Protective barrier 02/26/25 0951   Wound packed? No 02/26/25 0951   Dressing Changed New 02/26/25 0951   Patient Tolerance Tolerated well 02/26/25 0951   Dressing Status Clean;Dry;Intact 02/26/25  0951       Wound 02/25/25 Pressure Injury Hip Left;Lateral (Active)   Wound Image   02/26/25 0948   Wound Description Light purple;Non-blanchable erythema 02/26/25 0948   Pressure Injury Stage DTPI 02/26/25 0948   Ramandeep-wound Assessment Intact 02/26/25 0948   Wound Length (cm) 19 cm 02/26/25 0948   Wound Width (cm) 25 cm 02/26/25 0948   Wound Depth (cm) 0 cm 02/26/25 0948   Wound Surface Area (cm^2) 475 cm^2 02/26/25 0948   Wound Volume (cm^3) 0 cm^3 02/26/25 0948   Calculated Wound Volume (cm^3) 0 cm^3 02/26/25 0948   Non-staged Wound Description Not applicable 02/26/25 0948   Treatments Cleansed;Site care 02/26/25 0948   Dressing Moisture barrier 02/26/25 0948   Wound packed? No 02/26/25 0948   Dressing Changed New 02/26/25 0948   Patient Tolerance Tolerated well 02/26/25 0948   Dressing Status Clean;Dry;Intact 02/26/25 0948       Wound 02/25/25 Toe D2, second Anterior;Right (Active)   Wound Image   02/26/25 0941   Wound Description Intact;Dry;Black;Brown 02/26/25 0951   Ramandeep-wound Assessment Intact 02/26/25 0951   Wound Length (cm) 1 cm 02/26/25 0941   Wound Width (cm) 1.5 cm 02/26/25 0941   Wound Depth (cm) 0 cm 02/26/25 0941   Wound Surface Area (cm^2) 1.5 cm^2 02/26/25 0941   Wound Volume (cm^3) 0 cm^3 02/26/25 0941   Calculated Wound Volume (cm^3) 0 cm^3 02/26/25 0941   Non-staged Wound Description Not applicable 02/26/25 0951   Treatments Cleansed;Site care 02/26/25 0951   Dressing Open to air 02/26/25 0800   Wound packed? No 02/26/25 0951   Dressing Changed New 02/26/25 0951   Patient Tolerance Tolerated well 02/26/25 0951   Dressing Status Dry;Intact;Clean 02/26/25 0951               Tara Jauregui RN, BSN, CWOCN

## 2025-02-26 NOTE — ASSESSMENT & PLAN NOTE
Report of recent nausea/vomiting  Yellow bilious staining near mouth evident on arrival   PRN zofran  Aspiration precaution

## 2025-02-26 NOTE — ASSESSMENT & PLAN NOTE
History of pericardial effusion in June of 2023. Originally presented with concern for sepsis. Found to have a pericardial effusion and was transferred to [facility] and underwent pericardiocentesis on 6/30/23 by Dr. Adame. Unclear etiology per previous notes. Cultures negative however WBC 1,280 in pericardial fluid. Negative for malignancy  CT A/P showing moderate pericardial effusion   Check echo today   Eliquis currently on hold   Goal MAP >65

## 2025-02-26 NOTE — QUICK NOTE
Goals of care discussion held with patient and ED physician. Per Dr. Soto, patient would like to be full code. On my exam, patient is too sleepy to confirm code status. I attempted to call the patient's son without answer. No voicemail box available to leave message.

## 2025-02-26 NOTE — ASSESSMENT & PLAN NOTE
Wt Readings from Last 3 Encounters:   02/25/25 65 kg (143 lb 4.8 oz)   01/14/25 69.9 kg (154 lb 1.6 oz)   01/06/25 70.3 kg (155 lb)       Most recent Echo 12/9/24: EF 45%, abnormal diastolic dysfunction, mildly reduced RV function.   Home meds: torsemide 40mg BID  Lower extremity edema present but mucus membranes appear dry, weight down 4KG from one month ago    S/p fluid resuscitation in the ED  CXR consistent with vascular congestion and small pleural effusions  Hold diuretics for now given hypotension, concern for possible sepsis  Consideration for resuming as soon as able   Close I&Os  Daily weights

## 2025-02-26 NOTE — ASSESSMENT & PLAN NOTE
"Small bilateral pleural effusions present on CXR  CT with evidence of \"Partially imaged bilateral pleural effusions. Chronic right pleural thickening which may be on the basis of infection or other etiologies. Bibasilar opacities, likely atelectasis. '  Hx of exudative effusion s/p chest tube placement and tunneled pleural drain placed in 2023, s/p removal   Cultures negative, cytology negative  Thought to be hydropneumothorax ?  Consider diagnostic thora  Respiratory status currently stable on room air   "

## 2025-02-26 NOTE — H&P
H&P - Critical Care/ICU   Name: Yao Tipton 84 y.o. male I MRN: [id number]  Unit/Bed#: ED CT1 I Date of Admission: 2/25/2025   Date of Service: 2/25/2025 I Hospital Day: 0       Assessment & Plan  Shock (HCC)  Patient presents with hypotension, hypothermia. Likely secondary to septic shock in the setting of UTI. Hx of multiple UTI's. Also has a history of exudative pleural effusion in 2023. However, cannot rule out obstructive shock (hx of pericardial effusion s/p drainage in 2023) & hypovolemia at this time   LA 0.7, initial procal 0.34  UA with innumerable bacteria, WBC, negative nitrites, + large leuk. Hx of UTIs growing proteus. Most recently in January of this year secondary to urinary retention.  CXR: vascular congestion with small bilateral pleural effusions. No obvious consolidations  COVID/Flu negative on rapid antigen test  TSH 9, free T4 pending  Given >30mL/kg in the ED. Critical care asked to evaluate.     Plan:   Goal MAP >65, initiate levophed if needed  Check CT A/P given abnormal UA and KARINA on CKD to rule out obstruction or hydronephrosis   Continue cefepime for now, can likely de-escalate to ceftriaxone   Consider diagnostic thora   Check enteric stool panel/C. Diff given diarrhea in the ED  Check POCUS tonight to rule out pericardial effusion given hx   Check cortisol to rule out adrenal insufficiency given hypotension, hypothermia. 50mg of solucortef given in the ED  Follow up free T4  Close I&Os  Trend fever curve, WBC and procal       UTI (urinary tract infection)  UA concerning for possible UTI  Patient has hx of frequent UTI's related to retention  Most recent UTI in January. Cultures grew proteus on multiple occasions. No hx of MDRO  Previously had victor in place, d/c'ed 1/8/25  Given cefepime in the ED, continue for now but can likely de-escalate to ceftriaxone  CT A/P pending   Urine culture in process  Urinary retention protocol  Continue flomax   History of incision of  pericardium  History of pericardial effusion in June of 2023. Originally presented with concern for sepsis. Found to have a pericardial effusion and was transferred to [facility] and underwent pericardiocentesis on 6/30/23 by Dr. Adame. Unclear etiology per previous notes. Cultures negative however WBC 1,280 in pericardial fluid. Negative for malignancy  Check POCUS tonight  Check echo tomorrow  Goal MAP >65    Acute kidney injury superimposed on chronic kidney disease  (HCC)  Lab Results   Component Value Date    EGFR 13 02/25/2025    EGFR 21 01/18/2025    EGFR 20 01/17/2025    CREATININE 3.78 (H) 02/25/2025    CREATININE 2.60 (H) 01/18/2025    CREATININE 2.68 (H) 01/17/2025       Creatinine 3.78 on admission  Baseline creatinine 2.2-2.5?  S/p IV resuscitation in the ED  Hold home torsemide   Hx of urinary retention  CT A/P pending  Close I&O  Urinary retention protocol  Continue flomax  Avoid nephrotoxins   Hypothermia  Hypothermia, likely secondary to sepsis  Check cortisol to rule out adrenal insuffiencey(added on to labs prior to steroid dose), given 50mg solucortef in the ED    TSH 9, free T4 pending   Ruben resendiz to achieve normothermia  Thrombocytopenia (HCC)  Platelets 35K on arrival  Baseline platelets   Thrombocytopenia possible in the setting of sepsis  Hold eliquis and DVT ppx for now  Consider resuming when platelets >50K  Monitor for signs/symptoms of bleeding  Continue to trend   Chronic combined systolic and diastolic CHF (congestive heart failure) (HCC)  Wt Readings from Last 3 Encounters:   02/25/25 65 kg (143 lb 4.8 oz)   01/14/25 69.9 kg (154 lb 1.6 oz)   01/06/25 70.3 kg (155 lb)       Most recent Echo 12/9/24: EF 45%, abnormal diastolic dysfunction, mildly reduced RV function.   Home meds: torsemide 40mg BID  Lower extremity edema present but mucus membranes appear dry, weight down 4KG from one month ago    S/p fluid resuscitation in the ED  CXR consistent with vascular  "congestion and small pleural effusions  Hold diuretics for now given hypotension, concern for possible sepsis  Consideration for resuming as soon as able   Close I&Os  Daily weights      Chronic atrial fibrillation (HCC)  Chronic afib on eliquis  Not on rate control at this time  Currently HR int he 60s  Hold eliquis secondary to thrombocytopenia   Diarrhea  Episodes of diarrhea in the ED  Check stool enteric panel/C. Diff  Monitor output   Diabetes mellitus with peripheral artery disease (HCC)  Lab Results   Component Value Date    HGBA1C 7.7 (H) 12/07/2024       No results for input(s): \"POCGLU\" in the last 72 hours.    Blood Sugar Average: Last 72 hrs:      Hold home amaryl  Initiate SSI Q6 while NPO  Goal BG <180  Pleural effusion  Small bilateral pleural effusions present on CXR  CT with evidence of \"Partially imaged bilateral pleural effusions. Chronic right pleural thickening which may be on the basis of infection or other etiologies. Bibasilar opacities, likely atelectasis. '  Hx of exudative effusion s/p chest tube placement and tunneled pleural drain placed in 2023, s/p removal   Cultures negative, cytology negative  Thought to be hydropneumothorax ?  Consider diagnostic thora  Respiratory status currently stable on room air   Encephalopathy  Patient alert and oriented on arrival to the ED, goals of care discussion held with patient and ED physician  On my exam patient wakes, can answer one word answers but appears sleepy.non-focal exam  VBG without hypercarbia  Check ammonia  TSH 9  Hold sedating meds   [facility] ICU  Sleep hygiene   Nausea and vomiting  Report of recent nausea/vomiting  Yellow bilious staining near mouth evident on arrival   PRN zofran  Aspiration precaution   Disposition: Critical care    History of Present Illness   Yao Tipton is a 84 y.o. with past medical history of combined heart failure (EF 45%), afib on eliquis, thrombocytopenia, CKD, HLD who presented from [facility] " with decreased appetite, possible nausea and vomiting. On arrival he was found to be hypothermic and hypotensive. Septic workup initiated. He received cefepime and fluid resuscitation with intermittent improvement in BP's but ultimately his blood pressure remained low and critical care was asked to evaluate. On my exam, he wakes to stimuli but is a poor historian. He answers one word answers and follows commands but provides no other history. Denies fever chills, or any current symptoms. He appears chronically ill but is not in acute distress.     On chart review, he has multiple hospitalizations over the past several years. Previously discharged to hospice in 2023 which was subsequently revoked. Most recent hospitalizations are secondary to UTI with most recent course of antibiotics in January of this year secondary to urinary retention. He had a recent victor which was removed 1/8/25.  Urine has grown proteus in the past. No MDRO noted on micro review. He also has a history of exudative right pleural effusion requiring chest tube and ultimately tunneled pigtail placement in 2023, s/p removal of drain in January of 2024. He also had a pericardial effusion with early tamponade physiology requiring drainage in June of 2023.    Patient's son was present in the ED prior to my arrival. I tried to call him but he did not answer.     History obtained from chart review.  Review of Systems: Review of Systems not obtainable due to Altered mental status    Historical Information   Past Medical History:  No date: Atrial fibrillation (HCC)  No date: BPH (benign prostatic hyperplasia)  No date: CHF (congestive heart failure) (HCC)  No date: Chronic kidney disease  No date: Coronary artery disease  No date: Diabetes mellitus (HCC)  No date: Hyperlipidemia  No date: Hypertension  12/07/2024: Hyponatremia Past Surgical History:  6/30/2023: CARDIAC CATHETERIZATION; N/A      Comment:  Procedure: Cardiac pericardiocentesis;  Surgeon:                 Shanna Adame DO;  Location: [facility];                 Service: Cardiology  6/30/2023: CARDIAC CATHETERIZATION; N/A      Comment:  Procedure: Cardiac RHC;  Surgeon: Shanna Adame DO;               Location: [facility];  Service: Cardiology  No date: EYE SURGERY  6/24/2023: IR CHEST TUBE PLACEMENT  7/28/2023: IR CHEST TUBE PLACEMENT  8/7/2023: IR PLEURAL EFFUSION LONG-TERM CATHETER PLACEMENT  1/3/2024: IR PLEURAL EFFUSION LONG-TERM CATHETER REMOVAL  12/11/2023: IR THORACENTESIS  No date: SKIN CANCER EXCISION  No date: TONSILLECTOMY   Current Outpatient Medications   Medication Instructions    allopurinol (ZYLOPRIM) 100 mg, 2 times daily    ALPRAZolam (XANAX) 0.5 mg, Daily at bedtime PRN    apixaban (ELIQUIS) 2.5 mg, Oral, 2 times daily    ascorbic acid (VITAMIN C) 500 mg, Oral, Daily    atorvastatin (LIPITOR) 40 mg, Oral, Daily with dinner    Blood Pressure Monitor NILTON Does not apply, Daily    cholecalciferol (VITAMIN D3) 2,000 Units, Oral, Daily    docusate sodium (COLACE) 100 mg, Oral, 2 times daily PRN    famotidine (PEPCID) 10 mg, Oral, Daily at bedtime    ferrous sulfate 325 mg, Daily with breakfast    glimepiride (AMARYL) 4 mg, Daily with dinner    halobetasol (ULTRAVATE) 0.05 % cream     insulin aspart (NovoLOG FlexPen) 100 UNIT/ML injection pen     latanoprost (XALATAN) 0.005 % ophthalmic solution 1 drop, Daily at bedtime    Sure Comfort Pen Needles 32G X 4 MM MISC     tamsulosin (FLOMAX) 0.4 mg, Daily with dinner    timolol (TIMOPTIC) 0.5 % ophthalmic solution 1 drop, Both Eyes, Daily    torsemide (DEMADEX) 40 mg, Oral, 2 times daily    ZINC OXIDE PO Daily    Allergies   Allergen Reactions    Elemental Sulfur     Sulfa Antibiotics       Social History     Tobacco Use    Smoking status: Never    Smokeless tobacco: Former    Tobacco comments:     Smoked a pipe 50 years ago   Vaping Use    Vaping status: Never Used   Substance Use Topics    Alcohol use: Not  Currently     Alcohol/week: 0.0 standard drinks of alcohol     Comment: special occasions    Drug use: Not Currently    Family History   Problem Relation Age of Onset    Heart disease Mother     Diabetes Father     Vision loss Father     Cancer Sister     Diabetes Sister           Objective :                   Vitals I/O      Most Recent Min/Max in 24hrs   Temp (!) 94.5 °F (34.7 °C) Temp  Min: 91.8 °F (33.2 °C)  Max: 95.2 °F (35.1 °C)   Pulse 83 Pulse  Min: 61  Max: 84   Resp 22 Resp  Min: 16  Max: 34   BP 92/54 BP  Min: 72/42  Max: 131/52   O2 Sat 97 % SpO2  Min: 91 %  Max: 100 %      Intake/Output Summary (Last 24 hours) at 2025  Last data filed at 2025  Gross per 24 hour   Intake 2550 ml   Output 200 ml   Net 2350 ml       No diet orders on file    Invasive Monitoring           Physical Exam   Physical Exam  Skin:     Comments: Scattered upper extremity ecchymosis. Lower extremity with reddened/dry areas. Small bandages in place.    HENT:      Head: Normocephalic and atraumatic.      Mouth/Throat:      Mouth: Mucous membranes are dry.      Comments: Dried yellow staining around mouth   Cardiovascular:      Rate and Rhythm: Normal rate. Rhythm irregular.   Musculoskeletal:      Right lower le+ Edema present.      Left lower le+ Edema present.   Abdominal: General: Bowel sounds are normal.      Palpations: Abdomen is soft.      Tenderness: There is no abdominal tenderness.   Constitutional:       Appearance: He is underweight. He is ill-appearing. He is not toxic-appearing.   Pulmonary:      Effort: Tachypnea present. No accessory muscle usage, respiratory distress or accessory muscle usage.      Breath sounds: Decreased air movement present.   Neurological:      General: No focal deficit present.      Mental Status: He is easily aroused. He is lethargic.      GCS: GCS eye subscore is 3. GCS verbal subscore is 4. GCS motor subscore is 6.          Diagnostic Studies        Lab Results: I  have reviewed the following results:     Medications:  Scheduled PRN   [START ON 2/26/2025] cefepime, 1,000 mg, Q24H          Continuous    norepinephrine, 1-30 mcg/min, Last Rate: 3 mcg/min (02/25/25 2140)         Labs:   CBC    Recent Labs     02/25/25  1430   WBC 5.62   HGB 9.7*   HCT 28.9*   PLT 35*     BMP    Recent Labs     02/25/25  1430   SODIUM 133*   K 4.7   CL 99   CO2 22   AGAP 12   *   CREATININE 3.78*   CALCIUM 7.6*       Coags    No recent results     Additional Electrolytes  No recent results       Blood Gas    No recent results  Recent Labs     02/25/25  1942   PHVEN 7.384   ZNL2DGA 40.4*   PO2VEN 62.1*   [id number] 23.6*   BEVEN -1.4   [id number] 88.6*    LFTs  Recent Labs     02/25/25  1430   ALT 23   AST 26   ALKPHOS 130*   ALB 2.6*   TBILI 0.66       Infectious  Recent Labs     02/25/25  1732   PROCALCITONI 0.34*     Glucose  Recent Labs     02/25/25  1430   GLUC 156*        Administrative Statements   Critical Care Time Statement: Upon my evaluation, this patient had a high probability of imminent or life-threatening deterioration due to shock, which required my direct attention, intervention, and personal management.  I spent a total of 40 minutes directly providing critical care services, including interpretation of complex medical databases, evaluating for the presence of life-threatening injuries or illnesses, complex medical decision making (to support/prevent further life-threatening deterioration)., interpretation of hemodynamic data, and titration of vasoactive medications. This time is exclusive of procedures, teaching, family meetings, and any prior time recorded by providers other than myself.

## 2025-02-26 NOTE — ASSESSMENT & PLAN NOTE
Patient alert and oriented on arrival to the ED, goals of care discussion held with patient and ED physician  On my exam patient wakes, can answer one word answers but appears sleepy.non-focal exam  VBG without hypercarbia  Ammonia 29  TSH 9  Consider CT head if patient remains encephalopathic  Hold sedating meds   [facility] ICU  Sleep hygiene

## 2025-02-26 NOTE — ASSESSMENT & PLAN NOTE
UA concerning for possible UTI  Patient has hx of frequent UTI's related to retention  Most recent UTI in January. Cultures grew proteus on multiple occasions. No hx of MDRO  Previously had victor in place, d/c'ed 1/8/25  Given cefepime in the ED, continue for now but can likely de-escalate to ceftriaxone  CT A/P pending   Urine culture in process  Urinary retention protocol  Continue flomax

## 2025-02-26 NOTE — ASSESSMENT & PLAN NOTE
Lab Results   Component Value Date    EGFR 13 02/25/2025    EGFR 21 01/18/2025    EGFR 20 01/17/2025    CREATININE 3.78 (H) 02/25/2025    CREATININE 2.60 (H) 01/18/2025    CREATININE 2.68 (H) 01/17/2025       Creatinine 3.78 on admission  Baseline creatinine 2.2-2.5?  S/p IV resuscitation in the ED  Hold home torsemide   Hx of urinary retention  CT A/P pending  Close I&O  Urinary retention protocol  Continue flomax  Avoid nephrotoxins

## 2025-02-26 NOTE — PROGRESS NOTES
Progress Note - Critical Care/ICU   Name: Yao Tipton 84 y.o. male I MRN: [id number]  Unit/Bed#: ICU 05 I Date of Admission: 2/25/2025   Date of Service: 2/26/2025 I Hospital Day: 1      Assessment & Plan  Shock (HCC)  Patient presents with hypotension, hypothermia. Likely secondary to septic shock in the setting of UTI. Hx of multiple UTI's. Also has a history of exudative pleural effusion in 2023. However, cannot rule out obstructive shock (hx of pericardial effusion s/p drainage in 2023) & hypovolemia at this time   LA 0.7, initial procal 0.34  UA with innumerable bacteria, WBC, negative nitrites, + large leuk. Hx of UTIs growing proteus. Most recently in January of this year secondary to urinary retention.  CXR: vascular congestion with small bilateral pleural effusions. No obvious consolidations  CT A/P: Diffuse mild colonic wall thickening which may be due to underdistention or colitis. Evaluation is limited due to lack of oral and intravenous contrast.Urinary bladder is underdistended however appears somewhat thick walled. Correlation with urinalysis is recommended.Partially imaged bilateral pleural effusions. Chronic right pleural thickening which may be on the basis of infection or other etiologies. Bibasilar opacities, likely atelectasis.Cardiomegaly with moderate pericardial effusion.   COVID/Flu negative on rapid antigen test  TSH 9, free T4 pending  Given >30mL/kg in the ED. Critical care asked to evaluate.     Plan:   Goal MAP >65  Titrate levo to off as able   Continue cefepime for now, can likely de-escalate to ceftriaxone   Consider diagnostic thora   Follow up enteric stool panel/C. Diff given diarrhea in the ED, colonic thickening on imaging   Check echo given pericardial effusion seen on CT imaging to rule out tamponade   Follow up cortisol to rule out adrenal insufficiency given hypotension, hypothermia. 50mg of solucortef given in the ED  Follow up free T4  Close I&Os  Trend fever curve, WBC  and procal       UTI (urinary tract infection)  UA concerning for possible UTI  Patient has hx of frequent UTI's related to retention  Most recent UTI in January. Cultures grew proteus on multiple occasions. No hx of MDRO  Previously had victor in place, d/c'ed 1/8/25  Given cefepime in the ED, continue for now but can likely de-escalate to ceftriaxone  CT A/P bladder wall thicking. No hydro or obstruction   Urine culture in process  Urinary retention protocol  Continue flomax   History of incision of pericardium  History of pericardial effusion in June of 2023. Originally presented with concern for sepsis. Found to have a pericardial effusion and was transferred to [facility] and underwent pericardiocentesis on 6/30/23 by Dr. Adame. Unclear etiology per previous notes. Cultures negative however WBC 1,280 in pericardial fluid. Negative for malignancy  CT A/P showing moderate pericardial effusion   Check echo today   Eliquis currently on hold   Goal MAP >65    Acute kidney injury superimposed on chronic kidney disease  (HCC)  Lab Results   Component Value Date    EGFR 13 02/25/2025    EGFR 21 01/18/2025    EGFR 20 01/17/2025    CREATININE 3.78 (H) 02/25/2025    CREATININE 2.60 (H) 01/18/2025    CREATININE 2.68 (H) 01/17/2025       Creatinine 3.78 on admission  Baseline creatinine 2.2-2.5?  S/p IV resuscitation in the ED  Morning labs pending  Hold home torsemide   Hx of urinary retention  CT A/P without hydronephrosis or evidence of obstruction   Close I&O  Urinary retention protocol  Continue flomax  Avoid nephrotoxins   Hypothermia  Hypothermia, likely secondary to sepsis  Follow up cortisol to rule out adrenal insuffiencey(added on to labs prior to steroid dose), given 50mg solucortef in the ED    TSH 9, free T4 pending   Ruben resendiz to achieve normothermia  Thrombocytopenia (HCC)  Platelets 35K on arrival  Baseline platelets   Thrombocytopenia possible in the setting of sepsis  Check LDH, hemolysis smear,  "haptoglobin   Hold eliquis and DVT ppx for now  Consider resuming when platelets >50K  Monitor for signs/symptoms of bleeding  Continue to trend   Chronic combined systolic and diastolic CHF (congestive heart failure) (HCC)  Wt Readings from Last 3 Encounters:   02/25/25 65.4 kg (144 lb 2.9 oz)   01/14/25 69.9 kg (154 lb 1.6 oz)   01/06/25 70.3 kg (155 lb)       Most recent Echo 12/9/24: EF 45%, abnormal diastolic dysfunction, mildly reduced RV function.   Home meds: torsemide 40mg BID  Lower extremity edema present but mucus membranes appear dry, weight down 4KG from one month ago    S/p fluid resuscitation in the ED  CXR consistent with vascular congestion and small pleural effusions  Hold diuretics for now given hypotension, concern for possible sepsis  Consideration for resuming as soon as able   Close I&Os  Daily weights      Chronic atrial fibrillation (HCC)  Chronic afib on eliquis  Not on rate control at this time  Currently HR in the 60s  Hold eliquis secondary to thrombocytopenia   Diarrhea  Episodes of diarrhea in the ED  Check stool enteric panel/C. Diff  CT \"Diffuse mild colonic wall thickening which may be due to underdistention or colitis. Evaluation is limited due to lack of oral and intravenous contrast.\"  Monitor output   Diabetes mellitus with peripheral artery disease (HCC)  Lab Results   Component Value Date    HGBA1C 7.7 (H) 12/07/2024       No results for input(s): \"POCGLU\" in the last 72 hours.    Blood Sugar Average: Last 72 hrs:      Hold home amaryl  Initiate SSI Q6 while NPO  Goal BG <180  Pleural effusion  Small bilateral pleural effusions present on CXR  CT with evidence of \"Partially imaged bilateral pleural effusions. Chronic right pleural thickening which may be on the basis of infection or other etiologies. Bibasilar opacities, likely atelectasis. '  Hx of exudative effusion s/p chest tube placement and tunneled pleural drain placed in 2023, s/p removal   Cultures negative, " cytology negative  Thought to be hydropneumothorax ?  Consider diagnostic thora  Respiratory status currently stable on room air   Encephalopathy  Patient alert and oriented on arrival to the ED, goals of care discussion held with patient and ED physician  On my exam patient wakes, can answer one word answers but appears sleepy.non-focal exam  VBG without hypercarbia  Ammonia 29  TSH 9  Consider CT head if patient remains encephalopathic  Hold sedating meds   CAM ICU  Sleep hygiene   Nausea and vomiting  Report of recent nausea/vomiting  Yellow bilious staining near mouth evident on arrival   PRN zofran  Aspiration precaution   Disposition: Critical care    ICU Core Measures     A: Assess, Prevent, and Manage Pain Has pain been assessed? Yes  Need for changes to pain regimen? No   B: Both SAT/SAT  N/A   C: Choice of Sedation RASS Goal: 0 Alert and Calm  Need for changes to sedation or analgesia regimen? NA   D: Delirium CAM-ICU: Positive   E: Early Mobility  Plan for early mobility? Yes   F: Family Engagement Plan for family engagement today? Yes       Antibiotic Review: Awaiting culture results.       Prophylaxis:  VTE VTE covered by:    None       Stress Ulcer  covered byfamotidine (PEPCID) 10 mg tablet [[id number]] (Long-Term Med), famotidine (PEPCID) tablet 10 mg [[id number]]         24 Hour Events : Admitted to ICU. Required low dose levophed which has now been weaned to 1mcg. Patient wakes to stimuli, answers with one word answers but appears sleepy. Rests between all care. Temp slowly improving.   Subjective   Review of Systems: Review of Systems not obtainable due to Altered mental status    Objective :                   Vitals I/O      Most Recent Min/Max in 24hrs   Temp (!) 96 °F (35.6 °C) Temp  Min: 91.8 °F (33.2 °C)  Max: 97.8 °F (36.6 °C)   Pulse 82 Pulse  Min: 61  Max: 90   Resp 19 Resp  Min: 16  Max: 34   BP 96/54 BP  Min: 72/42  Max: 155/60   O2 Sat 98 % SpO2  Min: 91 %  Max: 100 %      Intake/Output  Summary (Last 24 hours) at 2025 0208  Last data filed at 2025  Gross per 24 hour   Intake 2550 ml   Output 200 ml   Net 2350 ml       Diet NPO    Invasive Monitoring           Physical Exam   Physical Exam  Eyes:      Pupils: Pupils are equal, round, and reactive to light.   Skin:     General: Skin is warm and dry.      Comments: Scattered upper extremity ecchmosis, bilateral lower extremity erythema with blistered areas. Scattered petechia. Necrotic dry area on right 2nd toe    HENT:      Head: Normocephalic.      Comments: Small laceration on bridge of nose     Mouth/Throat:      Mouth: Mucous membranes are dry.   Cardiovascular:      Rate and Rhythm: Normal rate. Rhythm irregular.   Musculoskeletal:      Right lower le+ Edema present.      Left lower le+ Edema present.   Abdominal:      Palpations: Abdomen is soft.   Constitutional:       Appearance: He is ill-appearing.   Pulmonary:      Effort: Pulmonary effort is normal. No accessory muscle usage, respiratory distress or accessory muscle usage.      Breath sounds: Examination of the right-middle field reveals decreased breath sounds. Examination of the right-lower field reveals decreased breath sounds. Examination of the left-lower field reveals decreased breath sounds. Decreased breath sounds present. No wheezing or rhonchi.   Neurological:      GCS: GCS eye subscore is 3. GCS verbal subscore is 4. GCS motor subscore is 6.      Motor: Weakness.      Comments: Sleepy, wakes to stimuli. Answers one word answers. Follows commands weakly with all extremities          Diagnostic Studies        Lab Results: I have reviewed the following results:     Medications:  Scheduled PRN   ascorbic acid, 500 mg, Daily  atorvastatin, 40 mg, Daily With Dinner  cefepime, 1,000 mg, Q24H  chlorhexidine, 15 mL, Q12H RASHAD  cholecalciferol, 2,000 Units, Daily  famotidine, 10 mg, HS  ferrous sulfate, 325 mg, Daily With Breakfast  insulin lispro, 1-5 Units, Q6H  RASHAD  latanoprost, 1 drop, HS  tamsulosin, 0.4 mg, Daily With Dinner  timolol, 1 drop, Daily      docusate sodium, 100 mg, BID PRN       Continuous    norepinephrine, 1-30 mcg/min, Last Rate: 1 mcg/min (02/25/25 2332)         Labs:   CBC    Recent Labs     02/25/25  1430   WBC 5.62   HGB 9.7*   HCT 28.9*   PLT 35*     BMP    Recent Labs     02/25/25  1430   SODIUM 133*   K 4.7   CL 99   CO2 22   AGAP 12   *   CREATININE 3.78*   CALCIUM 7.6*       Coags    No recent results     Additional Electrolytes  No recent results       Blood Gas    No recent results  Recent Labs     02/25/25  1942   PHVEN 7.384   CNJ6MMI 40.4*   PO2VEN 62.1*   UMU5FIU 23.6*   BEVEN -1.4   [id number] 88.6*    LFTs  Recent Labs     02/25/25  1430   ALT 23   AST 26   ALKPHOS 130*   ALB 2.6*   TBILI 0.66       Infectious  Recent Labs     02/25/25  1732   PROCALCITONI 0.34*     Glucose  Recent Labs     02/25/25  1430   GLUC 156*

## 2025-02-26 NOTE — ASSESSMENT & PLAN NOTE
History of pericardial effusion in June of 2023. Originally presented with concern for sepsis. Found to have a pericardial effusion and was transferred to [facility] and underwent pericardiocentesis on 6/30/23 by Dr. Adame. Unclear etiology per previous notes. Cultures negative however WBC 1,280 in pericardial fluid. Negative for malignancy  Check POCUS tonight  Check echo tomorrow  Goal MAP >65

## 2025-02-26 NOTE — ASSESSMENT & PLAN NOTE
Lab Results   Component Value Date    EGFR 13 02/25/2025    EGFR 21 01/18/2025    EGFR 20 01/17/2025    CREATININE 3.78 (H) 02/25/2025    CREATININE 2.60 (H) 01/18/2025    CREATININE 2.68 (H) 01/17/2025       Creatinine 3.78 on admission  Baseline creatinine 2.2-2.5?  S/p IV resuscitation in the ED  Morning labs pending  Hold home torsemide   Hx of urinary retention  CT A/P without hydronephrosis or evidence of obstruction   Close I&O  Urinary retention protocol  Continue flomax  Avoid nephrotoxins

## 2025-02-26 NOTE — ASSESSMENT & PLAN NOTE
Wt Readings from Last 3 Encounters:   02/25/25 65.4 kg (144 lb 2.9 oz)   01/14/25 69.9 kg (154 lb 1.6 oz)   01/06/25 70.3 kg (155 lb)       Most recent Echo 12/9/24: EF 45%, abnormal diastolic dysfunction, mildly reduced RV function.   Home meds: torsemide 40mg BID  Lower extremity edema present but mucus membranes appear dry, weight down 4KG from one month ago    S/p fluid resuscitation in the ED  CXR consistent with vascular congestion and small pleural effusions  Hold diuretics for now given hypotension, concern for possible sepsis  Consideration for resuming as soon as able   Close I&Os  Daily weights

## 2025-02-26 NOTE — ED NOTES
[facility] called ED seeking update on pt. This RN informed RN that called that pt would be admitted.     Marie Irving RN  02/25/25 [id number]

## 2025-02-26 NOTE — PROCEDURES
POC Cardiac US    Date/Time: 2/26/2025 7:45 AM    Performed by: SUSANA Arshad  Authorized by: SUSANA Arshad    Patient location:  ICU  Other Assisting Provider: No    Procedure details:     Exam Type:  Diagnostic    Indications: hypotension      Assessment / Evaluation for: cardiac function, pericardial effusion and inferior vena cava for fluid responsiveness      Exam Type: initial exam      Image quality: diagnostic      Image availability:  Not saved  Patient Details:     Cardiac Rhythm:  Regular    Mechanical ventilation: No    Cardiac findings:     Echo technique: limited 2D and M-mode      Views obtained: parasternal long axis, parasternal short axis and subcostal      Pericardial effusion: large      Tamponade physiology: absent (mild chamber collapse of RV, RA without chamber collapse)      Wall motion: hyperdynamic      LV systolic function: hyperdynamic      RV dilation: mild    Pulmonary findings:     B-lines: no B-lines present    IVC findings:     IVC Size: small      IVC Inspiratory Collapse: normal      Maximum IVC Diameter (cm):  1.8    Minimum IVC Diameter (cm):  0.7    IVC Variability (%):  61  Interpretation:     Fluid Status:  Hypovolemic     Evaluated in setting of pericardial effusion. Unable to visualize RV well on PLAX, PSAX. Apical view with visualization of effusion only. In subcostal view with large apical effusion noted. Concern for mild chamber collapse of RV given apical effusion. RA without chamber collapse. LVEF appearing hyperdynamic, IVC suggesting hypovolemia. Plan formal ECHO for in depth assessment of tamponade. Will consult cardiology

## 2025-02-26 NOTE — ASSESSMENT & PLAN NOTE
Chronic afib on eliquis  Not on rate control at this time  Currently HR in the 60s  Hold eliquis secondary to thrombocytopenia

## 2025-02-26 NOTE — DISCHARGE INSTR - OTHER ORDERS
Skin Care Plan:  1-B/L Sacro-Buttocks, Ramandeep-rectal Area, B/L Hips/Lateral Thighs: apply hydraguard to areas for treatment BID and PRN episodes of incontinence.   2-Turn/reposition q2h or when medically stable for pressure re-distribution on skin. Utilize ATR turning and repositioning system   3-Elevate heels to offload pressure. Apply offloading boots to b/l feet.   4-Moisturize skin daily with skin nourishing cream  5-Ehob cushion in chair when out of bed.  6-Preventative Hydraguard to bilateral heels BID and PRN.   7-Mignon Right Foot 2nd Toe Eschar with Betadine daily.   8-B/L Pretibial Leg Wounds: Cleanse with NSS and pat dry. Apply 3m no sting skin prep to left pretibial leg blister and allow to dry. Apply adaptic to blister and open wound and cover both with silicone bordered foam dressings. Jaleel with T for Treatment and change every other day or PRN  9-Nursing communication: please order patient a P-500 low air loss mattress when transferred out of the ICU.

## 2025-02-26 NOTE — ED NOTES
Pt had bowel incontinence x2, cleaned and changed. Provided with warm blankets.     Myrna Mitchell, ROBERTO  02/25/25 2018

## 2025-02-26 NOTE — PLAN OF CARE
Problem: Potential for Falls  Goal: Patient will remain free of falls  Description: INTERVENTIONS:  - Educate patient/family on patient safety including physical limitations  - Instruct patient to call for assistance with activity   - Consult OT/PT to assist with strengthening/mobility   - Keep Call bell within reach  - Keep bed low and locked with side rails adjusted as appropriate  - Keep care items and personal belongings within reach  - Initiate and maintain comfort rounds  - Make Fall Risk Sign visible to staff  - Offer Toileting every 2 Hours, in advance of need  - Initiate/Maintain bed/chair alarm  - Obtain necessary fall risk management equipment  - Apply yellow socks and bracelet for high fall risk patients  - Consider moving patient to room near nurses station  2/26/2025 1055 by Myla Moses RN  Outcome: Progressing  2/26/2025 1054 by Myla Moses RN  Outcome: Progressing     Problem: INFECTION - ADULT  Goal: Absence or prevention of progression during hospitalization  Description: INTERVENTIONS:  - Assess and monitor for signs and symptoms of infection  - Monitor lab/diagnostic results  - Monitor all insertion sites, i.e. indwelling lines, tubes, and drains  - Monitor endotracheal if appropriate and nasal secretions for changes in amount and color  - [place] appropriate cooling/warming therapies per order  - Administer medications as ordered  - Instruct and encourage patient and family to use good hand hygiene technique  - Identify and instruct in appropriate isolation precautions for identified infection/condition  2/26/2025 1055 by Myla Moses RN  Outcome: Progressing  2/26/2025 1054 by Myla Moses RN  Outcome: Progressing     Problem: DISCHARGE PLANNING  Goal: Discharge to home or other facility with appropriate resources  Description: INTERVENTIONS:  - Identify barriers to discharge w/patient and caregiver  - Arrange for needed discharge resources and transportation as appropriate  -  Identify discharge learning needs (meds, wound care, etc.)  - Arrange for interpretive services to assist at discharge as needed  - Refer to Case Management Department for coordinating discharge planning if the patient needs post-hospital services based on physician/advanced practitioner order or complex needs related to functional status, cognitive ability, or social support system  2/26/2025 1055 by Myla Moses RN  Outcome: Progressing  2/26/2025 1054 by Myla Moses RN  Outcome: Progressing     Problem: Knowledge Deficit  Goal: Patient/family/caregiver demonstrates understanding of disease process, treatment plan, medications, and discharge instructions  Description: Complete learning assessment and assess knowledge base.  Interventions:  - Provide teaching at level of understanding  - Provide teaching via preferred learning methods  2/26/2025 1055 by Myla Moses RN  Outcome: Progressing  2/26/2025 1054 by Myla Moses RN  Outcome: Progressing     Problem: NEUROSENSORY - ADULT  Goal: Achieves stable or improved neurological status  Description: INTERVENTIONS  - Monitor and report changes in neurological status  - Monitor vital signs such as temperature, blood pressure, glucose, and any other labs ordered   - Initiate measures to prevent increased intracranial pressure  - Monitor for seizure activity and implement precautions if appropriate      2/26/2025 1055 by Myla Moses RN  Outcome: Progressing  2/26/2025 1054 by Myla Moses RN  Outcome: Progressing     Problem: CARDIOVASCULAR - ADULT  Goal: Maintains optimal cardiac output and hemodynamic stability  Description: INTERVENTIONS:  - Monitor I/O, vital signs and rhythm  - Monitor for S/S and trends of decreased cardiac output  - Administer and titrate ordered vasoactive medications to optimize hemodynamic stability  - Assess quality of pulses, skin color and temperature  - Assess for signs of decreased coronary artery perfusion  - Instruct patient to  report change in severity of symptoms  2/26/2025 1055 by Myla Moses RN  Outcome: Progressing  2/26/2025 1054 by Myla Moses RN  Outcome: Progressing  Goal: Absence of cardiac dysrhythmias or at baseline rhythm  Description: INTERVENTIONS:  - Continuous cardiac monitoring, vital signs, obtain 12 lead EKG if ordered  - Administer antiarrhythmic and heart rate control medications as ordered  - Monitor electrolytes and administer replacement therapy as ordered  2/26/2025 1055 by Myla Moses RN  Outcome: Progressing  2/26/2025 1054 by Myla Moses RN  Outcome: Progressing     Problem: GASTROINTESTINAL - ADULT  Goal: Minimal or absence of nausea and/or vomiting  Description: INTERVENTIONS:  - Administer IV fluids if ordered to ensure adequate hydration  - Maintain NPO status until nausea and vomiting are resolved  - Nasogastric tube if ordered  - Administer ordered antiemetic medications as needed  - Provide nonpharmacologic comfort measures as appropriate  - Advance diet as tolerated, if ordered  - Consider nutrition services referral to assist patient with adequate nutrition and appropriate food choices  2/26/2025 1055 by Myla Moses RN  Outcome: Progressing  2/26/2025 1054 by Myla Moses RN  Outcome: Progressing  Goal: Maintains or returns to baseline bowel function  Description: INTERVENTIONS:  - Assess bowel function  - Encourage oral fluids to ensure adequate hydration  - Administer IV fluids if ordered to ensure adequate hydration  - Administer ordered medications as needed  - Encourage mobilization and activity  - Consider nutritional services referral to assist patient with adequate nutrition and appropriate food choices  2/26/2025 1055 by Myla Moses RN  Outcome: Progressing  2/26/2025 1054 by Myla Moses RN  Outcome: Progressing  Goal: Maintains adequate nutritional intake  Description: INTERVENTIONS:  - Monitor percentage of each meal consumed  - Identify factors contributing to decreased  intake, treat as appropriate  - Assist with meals as needed  - Monitor I&O, weight, and lab values if indicated  - Obtain nutrition services referral as needed  2/26/2025 1055 by Myla Moses RN  Outcome: Progressing  2/26/2025 1054 by Myla Moses RN  Outcome: Progressing     Problem: GENITOURINARY - ADULT  Goal: Maintains or returns to baseline urinary function  Description: INTERVENTIONS:  - Assess urinary function  - Encourage oral fluids to ensure adequate hydration if ordered  - Administer IV fluids as ordered to ensure adequate hydration  - Administer ordered medications as needed  - Offer frequent toileting  - Follow urinary retention protocol if ordered  2/26/2025 1055 by Myla Moses RN  Outcome: Progressing  2/26/2025 1054 by Myla Moses RN  Outcome: Progressing  Goal: Absence of urinary retention  Description: INTERVENTIONS:  - Assess patient’s ability to void and empty bladder  - Monitor I/O  - Bladder scan as needed  - Discuss with physician/AP medications to alleviate retention as needed  - Discuss catheterization for long term situations as appropriate  2/26/2025 1055 by Myla Moses RN  Outcome: Progressing  2/26/2025 1054 by Myla Moses RN  Outcome: Progressing     Problem: METABOLIC, FLUID AND ELECTROLYTES - ADULT  Goal: Electrolytes maintained within normal limits  Description: INTERVENTIONS:  - Monitor labs and assess patient for signs and symptoms of electrolyte imbalances  - Administer electrolyte replacement as ordered  - Monitor response to electrolyte replacements, including repeat lab results as appropriate  - Instruct patient on fluid and nutrition as appropriate  2/26/2025 1055 by Myla Moses RN  Outcome: Progressing  2/26/2025 1054 by Myla Moses RN  Outcome: Progressing  Goal: Fluid balance maintained  Description: INTERVENTIONS:  - Monitor labs   - Monitor I/O and WT  - Instruct patient on fluid and nutrition as appropriate  - Assess for signs & symptoms of volume excess  or deficit  2/26/2025 1055 by Myla Moses RN  Outcome: Progressing  2/26/2025 1054 by Myla Moses RN  Outcome: Progressing     Problem: SKIN/TISSUE INTEGRITY - ADULT  Goal: Incision(s), wounds(s) or drain site(s) healing without S/S of infection  Description: INTERVENTIONS  - Assess and document dressing, incision, wound bed, drain sites and surrounding tissue  - Provide patient and family education  - Perform skin care/dressing changes every shift  2/26/2025 1055 by Myla Moses RN  Outcome: Progressing  2/26/2025 1054 by Myla Moses RN  Outcome: Progressing  Goal: Pressure injury heals and does not worsen  Description: Interventions:  - Implement low air loss mattress or specialty surface (Criteria met)  - Apply silicone foam dressing  - Instruct/assist with weight shifting every 90 minutes when in chair   - Limit chair time to 4 hour intervals  - Use special pressure reducing interventions such as EHOB when in chair   - Apply fecal or urinary incontinence containment device   - Perform passive or active ROM every 2 hours  - Turn and reposition patient & offload bony prominences every 2 hours   - Utilize friction reducing device or surface for transfers   - Consider consults to  interdisciplinary teams   - Use incontinent care products after each incontinent episode   - Consider nutrition services referral as needed  2/26/2025 1055 by Myla Moses RN  Outcome: Progressing  2/26/2025 1054 by Myla Moses RN  Outcome: Progressing     Problem: Nutrition/Hydration-ADULT  Goal: Nutrient/Hydration intake appropriate for improving, restoring or maintaining nutritional needs  Description: Monitor and assess patient's nutrition/hydration status for malnutrition. Collaborate with interdisciplinary team and initiate plan and interventions as ordered.  Monitor patient's weight and dietary intake as ordered or per policy. Utilize nutrition screening tool and intervene as necessary. Determine patient's food preferences  and provide high-protein, high-caloric foods as appropriate.     INTERVENTIONS:  - Monitor oral intake, urinary output, labs, and treatment plans  - Assess nutrition and hydration status and recommend course of action  - Evaluate amount of meals eaten  - Assist patient with eating if necessary   - Allow adequate time for meals  - Recommend/ encourage appropriate diets, oral nutritional supplements, and vitamin/mineral supplements  - Order, calculate, and assess calorie counts as needed  - Recommend, monitor, and adjust tube feedings and TPN/PPN based on assessed needs  - Assess need for intravenous fluids  - Provide specific nutrition/hydration education as appropriate  - Include patient/family/caregiver in decisions related to nutrition  2/26/2025 1055 by Myla Moses RN  Outcome: Progressing  2/26/2025 1054 by Myla Moses RN  Outcome: Progressing     Problem: Prexisting or High Potential for Compromised Skin Integrity  Goal: Skin integrity is maintained or improved  Description: INTERVENTIONS:  - Identify patients at risk for skin breakdown  - Assess and monitor skin integrity  - Assess and monitor nutrition and hydration status  - Monitor labs   - Assess for incontinence   - Turn and reposition patient  - Assist with mobility/ambulation  - Relieve pressure over bony prominences  - Avoid friction and shearing  - Provide appropriate hygiene as needed including keeping skin clean and dry  - Evaluate need for skin moisturizer/barrier cream  - Collaborate with interdisciplinary team   - Patient/family teaching  - Consider wound care consult   2/26/2025 1055 by Myla Moses RN  Outcome: Progressing  2/26/2025 1054 by Myla Moses RN  Outcome: Progressing

## 2025-02-26 NOTE — ED NOTES
Care center called for update on patient. This RN informed care center patient was admitted for hypotension and dehydration.      Maribel Sanchez RN  02/26/25 0117

## 2025-02-26 NOTE — ASSESSMENT & PLAN NOTE
Platelets 35K on arrival  Baseline platelets   Thrombocytopenia possible in the setting of sepsis  Check LDH, hemolysis smear, haptoglobin   Hold eliquis and DVT ppx for now  Consider resuming when platelets >50K  Monitor for signs/symptoms of bleeding  Continue to trend

## 2025-02-26 NOTE — PROGRESS NOTES
Yao Tipton is a 84 y.o. male who is currently ordered Vancomycin IV with management by the Pharmacy Consult service.  Relevant clinical data and objective / subjective history reviewed.  Vancomycin Assessment:  Indication and Goal AUC/Trough: Pneumonia (goal -600, trough >10), -600, trough >10  Clinical Status: stable  Micro:     Renal Function:  SCr: 5.02 mg/dL  CrCl: 13.8 mL/min  Renal replacement: Not on dialysis  Days of Therapy: 1  Current Dose: Loading dose: 1750 mg (25 mg/kg) IV x 1 dose  Vancomycin Plan:  New Dosin mg IV dailyprn when trough </= 15  Next Level: 24 at 0600  Renal Function Monitoring: Daily BMP and UOP  Pharmacy will continue to follow closely for s/sx of nephrotoxicity, infusion reactions and appropriateness of therapy.  BMP and CBC will be ordered per protocol. We will continue to follow the patient’s culture results and clinical progress daily.    Meghann Sweeney, Pharmacist

## 2025-02-26 NOTE — ASSESSMENT & PLAN NOTE
"Lab Results   Component Value Date    HGBA1C 7.7 (H) 12/07/2024       No results for input(s): \"POCGLU\" in the last 72 hours.    Blood Sugar Average: Last 72 hrs:      Hold home amaryl  Initiate SSI Q6 while NPO  Goal BG <180  "

## 2025-02-26 NOTE — ASSESSMENT & PLAN NOTE
"Episodes of diarrhea in the ED  Check stool enteric panel/C. Diff  CT \"Diffuse mild colonic wall thickening which may be due to underdistention or colitis. Evaluation is limited due to lack of oral and intravenous contrast.\"  Monitor output   "

## 2025-02-26 NOTE — ASSESSMENT & PLAN NOTE
UA concerning for possible UTI  Patient has hx of frequent UTI's related to retention  Most recent UTI in January. Cultures grew proteus on multiple occasions. No hx of MDRO  Previously had victor in place, d/c'ed 1/8/25  Given cefepime in the ED, continue for now but can likely de-escalate to ceftriaxone  CT A/P bladder wall thicking. No hydro or obstruction   Urine culture in process  Urinary retention protocol  Continue flomax

## 2025-02-26 NOTE — ASSESSMENT & PLAN NOTE
Hypothermia, likely secondary to sepsis  Check cortisol to rule out adrenal insuffiencey(added on to labs prior to steroid dose), given 50mg solucortef in the ED    TSH 9, free T4 pending   Ruben hugger to achieve normothermia

## 2025-02-26 NOTE — ASSESSMENT & PLAN NOTE
Patient presents with hypotension, hypothermia. Likely secondary to septic shock in the setting of UTI. Hx of multiple UTI's. Also has a history of exudative pleural effusion in 2023. However, cannot rule out obstructive shock (hx of pericardial effusion s/p drainage in 2023) & hypovolemia at this time   LA 0.7, initial procal 0.34  UA with innumerable bacteria, WBC, negative nitrites, + large leuk. Hx of UTIs growing proteus. Most recently in January of this year secondary to urinary retention.  CXR: vascular congestion with small bilateral pleural effusions. No obvious consolidations  CT A/P: Diffuse mild colonic wall thickening which may be due to underdistention or colitis. Evaluation is limited due to lack of oral and intravenous contrast.Urinary bladder is underdistended however appears somewhat thick walled. Correlation with urinalysis is recommended.Partially imaged bilateral pleural effusions. Chronic right pleural thickening which may be on the basis of infection or other etiologies. Bibasilar opacities, likely atelectasis.Cardiomegaly with moderate pericardial effusion.   COVID/Flu negative on rapid antigen test  TSH 9, free T4 pending  Given >30mL/kg in the ED. Critical care asked to evaluate.     Plan:   Goal MAP >65  Titrate levo to off as able   Continue cefepime for now, can likely de-escalate to ceftriaxone   Consider diagnostic thora   Follow up enteric stool panel/C. Diff given diarrhea in the ED, colonic thickening on imaging   Check echo given pericardial effusion seen on CT imaging to rule out tamponade   Follow up cortisol to rule out adrenal insufficiency given hypotension, hypothermia. 50mg of solucortef given in the ED  Follow up free T4  Close I&Os  Trend fever curve, WBC and procal

## 2025-02-26 NOTE — ASSESSMENT & PLAN NOTE
Patient presents with hypotension, hypothermia. Likely secondary to septic shock in the setting of UTI. Hx of multiple UTI's. Also has a history of exudative pleural effusion in 2023. However, cannot rule out obstructive shock (hx of pericardial effusion s/p drainage in 2023) & hypovolemia at this time   LA 0.7, initial procal 0.34  UA with innumerable bacteria, WBC, negative nitrites, + large leuk. Hx of UTIs growing proteus. Most recently in January of this year secondary to urinary retention.  CXR: vascular congestion with small bilateral pleural effusions. No obvious consolidations  COVID/Flu negative on rapid antigen test  TSH 9, free T4 pending  Given >30mL/kg in the ED. Critical care asked to evaluate.     Plan:   Goal MAP >65, initiate levophed if needed  Check CT A/P given abnormal UA and KARINA on CKD to rule out obstruction or hydronephrosis   Continue cefepime for now, can likely de-escalate to ceftriaxone   Consider diagnostic thora   Check enteric stool panel/C. Diff given diarrhea in the ED  Check POCUS tonight to rule out pericardial effusion given hx   Check cortisol to rule out adrenal insufficiency given hypotension, hypothermia. 50mg of solucortef given in the ED  Follow up free T4  Close I&Os  Trend fever curve, WBC and procal

## 2025-02-26 NOTE — ASSESSMENT & PLAN NOTE
Platelets 35K on arrival  Baseline platelets   Thrombocytopenia possible in the setting of sepsis  Hold eliquis and DVT ppx for now  Consider resuming when platelets >50K  Monitor for signs/symptoms of bleeding  Continue to trend

## 2025-02-26 NOTE — ASSESSMENT & PLAN NOTE
Patient alert and oriented on arrival to the ED, goals of care discussion held with patient and ED physician  On my exam patient wakes, can answer one word answers but appears sleepy.non-focal exam  VBG without hypercarbia  Check ammonia  TSH 9  Hold sedating meds   [facility] ICU  Sleep hygiene

## 2025-02-26 NOTE — ASSESSMENT & PLAN NOTE
Hypothermia, likely secondary to sepsis  Follow up cortisol to rule out adrenal insuffiencey(added on to labs prior to steroid dose), given 50mg solucortef in the ED    TSH 9, free T4 pending   Ruben hugger to achieve normothermia

## 2025-02-27 PROBLEM — R00.1 BRADYCARDIA: Status: RESOLVED | Noted: 2025-02-27 | Resolved: 2025-02-27

## 2025-02-27 PROBLEM — G93.41 METABOLIC ENCEPHALOPATHY: Status: ACTIVE | Noted: 2023-07-02

## 2025-02-27 PROBLEM — G93.41 METABOLIC ENCEPHALOPATHY: Status: RESOLVED | Noted: 2025-02-27 | Resolved: 2025-02-27

## 2025-02-27 PROBLEM — R00.1 BRADYCARDIA: Status: ACTIVE | Noted: 2025-01-01

## 2025-02-27 PROBLEM — G93.41 METABOLIC ENCEPHALOPATHY: Status: ACTIVE | Noted: 2025-01-01

## 2025-02-27 PROBLEM — R11.2 NAUSEA AND VOMITING: Status: RESOLVED | Noted: 2025-02-25 | Resolved: 2025-02-27

## 2025-02-27 NOTE — PROGRESS NOTES
Progress Note - Critical Care/ICU   Name: Yao Tipton 84 y.o. male I MRN: [id number]  Unit/Bed#: ICU 05 I Date of Admission: 2/25/2025   Date of Service: 2/27/2025 I Hospital Day: 2      Assessment & Plan  Shock (HCC)  Patient presents with hypotension, hypothermia. Likely secondary to septic shock in the setting of UTI. Hx of multiple UTI's. Also has a history of exudative pleural effusion in 2023. However, cannot rule out obstructive shock (hx of pericardial effusion s/p drainage in 2023) & hypovolemia at this time   LA 0.7, initial procal 0.34  UA with innumerable bacteria, WBC, negative nitrites, + large leuk. Hx of UTIs growing proteus. Most recently in January of this year secondary to urinary retention.  CXR: vascular congestion with small bilateral pleural effusions. No obvious consolidations  CT A/P: Diffuse mild colonic wall thickening which may be due to underdistention or colitis. Evaluation is limited due to lack of oral and intravenous contrast.Urinary bladder is underdistended however appears somewhat thick walled. Correlation with urinalysis is recommended.Partially imaged bilateral pleural effusions. Chronic right pleural thickening which may be on the basis of infection or other etiologies. Bibasilar opacities, likely atelectasis.Cardiomegaly with moderate pericardial effusion.   COVID/Flu negative on rapid antigen test  C Diff + but EIA negative suggesting colonization, no active infection   Stool enteric panel negative  BC negative x 24  UA pending   TSH 9, free T4 pending  Given >30mL/kg in the ED. Critical care asked to evaluate  2/26 Echo without evidence of tamponade physiology but could have some constrictive pericarditis     Plan:   Goal MAP >65  Remains on low dose levophed  Titrate levo to off as able   Continue cefepime for now, can likely de-escalate to ceftriaxone   Consider diagnostic thora   Close I&Os  Trend fever curve, WBC and procal       UTI (urinary tract infection)  UA  concerning for possible UTI  Patient has hx of frequent UTI's related to retention  Most recent UTI in January. Cultures grew proteus on multiple occasions. No hx of MDRO  Previously had victor in place, d/c'ed 1/8/25  Given cefepime in the ED, continue for now but can likely de-escalate to ceftriaxone  CT A/P bladder wall thicking. No hydro or obstruction   Urine culture pending  Urinary retention protocol  Straight cath'ed 2 x so far  Continue flomax   Pericardial effusion  History of pericardial effusion in June of 2023. Originally presented with concern for sepsis. Found to have a pericardial effusion and was transferred to [facility] and underwent pericardiocentesis on 6/30/23 by Dr. Adame. Unclear etiology per previous notes. Cultures negative however WBC 1,280 in pericardial fluid. Negative for malignancy  2/25: CT A/P showing moderate pericardial effusion   2/26 Echo : EF 60%, There is a moderate pericardial effusion. Glides mainly to apical area as well as right ventricular and right atrium with no evidence of tamponade. Pericardium is very thickened. Certainly we cannot rule out chronic constriction.   Eliquis currently on hold   Goal MAP >65    Acute kidney injury superimposed on chronic kidney disease  (HCC)  Lab Results   Component Value Date    EGFR 14 02/26/2025    EGFR 13 02/25/2025    EGFR 21 01/18/2025    CREATININE 3.60 (H) 02/26/2025    CREATININE 3.78 (H) 02/25/2025    CREATININE 2.60 (H) 01/18/2025       Creatinine 3.78 on admission  Baseline creatinine 2.2-2.5?  S/p IV resuscitation in the ED  Creatinine improving slowly   Hold home torsemide   Hx of urinary retention  CT A/P without hydronephrosis or evidence of obstruction   Close I&O  Urinary retention protocol  Continue flomax  Avoid nephrotoxins   Hypothermia  Hypothermia, likely secondary to sepsis  given 50mg solucortef in the ED, cortisol level unfortunately obtained after hydrocortisone administration   TSH 9, free T4 pending   Ruben  "hugger to achieve normothermia  Thrombocytopenia (HCC)  Platelets 35K on arrival  Baseline platelets   Thrombocytopenia possible in the setting of sepsis  Hold eliquis and DVT ppx for now  Consider resuming when platelets >50K  Monitor for signs/symptoms of bleeding  Continue to trend   Chronic combined systolic and diastolic CHF (congestive heart failure) (HCC)  Wt Readings from Last 3 Encounters:   02/26/25 66.2 kg (146 lb)   01/14/25 69.9 kg (154 lb 1.6 oz)   01/06/25 70.3 kg (155 lb)       Most recent Echo 12/9/24: EF 45%, abnormal diastolic dysfunction, mildly reduced RV function.   Home meds: torsemide 40mg BID  Lower extremity edema present but mucus membranes appear dry, weight down 4KG from one month ago    S/p fluid resuscitation in the ED  CXR consistent with vascular congestion and small pleural effusions  Hold diuretics for now given hypotension, concern for possible sepsis  Consideration for resuming as soon as able   Close I&Os  Daily weights      Chronic atrial fibrillation (HCC)  Chronic afib on eliquis  Not on rate control at this time  Currently HR in the 60s  Hold eliquis secondary to thrombocytopenia   Diarrhea  Episodes of diarrhea in the ED  C diff positive, EIA negative  Enteric stool panel negative   CT \"Diffuse mild colonic wall thickening which may be due to underdistention or colitis. Evaluation is limited due to lack of oral and intravenous contrast.\"  Monitor output   Diabetes mellitus with peripheral artery disease (HCC)  Lab Results   Component Value Date    HGBA1C 7.7 (H) 12/07/2024       Recent Labs     02/26/25  1810 02/26/25  1844 02/26/25  2146 02/26/25  2346   POCGLU 49* 84 70 81       Blood Sugar Average: Last 72 hrs:  (P) 71.6    Hold home amaryl  Continue SSI Q6 while NPO  Goal BG <180  Pleural effusion  Small bilateral pleural effusions present on CXR  CT with evidence of \"Partially imaged bilateral pleural effusions. Chronic right pleural thickening which may " be on the basis of infection or other etiologies. Bibasilar opacities, likely atelectasis. '  Hx of exudative effusion s/p chest tube placement and tunneled pleural drain placed in 2023, s/p removal   Cultures negative, cytology negative  Thought to be hydropneumothorax ?  Consider diagnostic thora  Respiratory status currently stable on room air   Metabolic encephalopathy  Patient alert and oriented on arrival to the ED, goals of care discussion held with patient and ED physician  On my exam patient wakes, can answer one word answers but appears sleepy.non-focal exam  Likely metabolic encephalopathy in the setting of UTI  VBG without hypercarbia  Ammonia 29  TSH 9  Consider CT head if patient remains encephalopathic  Hold sedating meds   CAM ICU  Sleep hygiene   Nausea and vomiting  Report of recent nausea/vomiting  Yellow bilious staining near mouth evident on arrival   PRN zofran  Aspiration precaution   Bradycardia  Episodes of bradycardia overnight. EKG showing Afib with slow ventricular rate  Temp 96.3, restarted on yola hugger  Hold AV donnie blocking agents  Continue to monitor on telemetry   Disposition: Critical care    ICU Core Measures     A: Assess, Prevent, and Manage Pain Has pain been assessed? Yes  Need for changes to pain regimen? No   B: Both SAT/SAT  N/A   C: Choice of Sedation RASS Goal: N/A patient not on sedation  Need for changes to sedation or analgesia regimen? NA   D: Delirium CAM-ICU: Negative   E: Early Mobility  Plan for early mobility? Yes   F: Family Engagement Plan for family engagement today? Yes       Antibiotic Review: Patient on appropriate coverage based on culture data.       Prophylaxis:  VTE VTE covered by:    None       Stress Ulcer  covered byfamotidine (PEPCID) 10 mg tablet [[id number]] (Long-Term Med), pantoprazole (PROTONIX) injection 40 mg [[id number]]         24 Hour Events : Remains on low dose levophed. Given additional fluid yesterday. Echo without tamponade  physiology. Episodes of bradycardia overnight due to Afib with slow ventricular response. Ruben hugger restarted secondary to temp 96. Hypoglycemic overnight requiring D50. Started on D5LR with improvement. Levophed increased to 4.      Subjective   Review of Systems: Review of Systems    Objective :                   Vitals I/O      Most Recent Min/Max in 24hrs   Temp (!) 96.3 °F (35.7 °C) Temp  Min: 95.5 °F (35.3 °C)  Max: 97.8 °F (36.6 °C)   Pulse 65 Pulse  Min: 60  Max: 91   Resp 21 Resp  Min: 12  Max: 36   BP (!) 87/51 BP  Min: 82/46  Max: 116/56   O2 Sat 98 % SpO2  Min: 93 %  Max: 100 %      Intake/Output Summary (Last 24 hours) at 2025 0145  Last data filed at 2025  Gross per 24 hour   Intake 1649.44 ml   Output 325 ml   Net 1324.44 ml       Diet NPO; Sips of clear liquids    Invasive Monitoring           Physical Exam   Physical Exam  Skin:     General: Skin is warm and dry.      Comments: Bilateral lower extremity erythema with dressings in place. Scattered bruising on arms.    HENT:      Head: Normocephalic and atraumatic.      Comments: Bandage on bridge of nose     Mouth/Throat:      Mouth: Mucous membranes are moist.   Cardiovascular:      Rate and Rhythm: Bradycardia present. Rhythm irregular.   Musculoskeletal:      Right lower le+ Edema present.      Left lower le+ Edema present.   Abdominal: General: Bowel sounds are normal.      Palpations: Abdomen is soft.      Tenderness: There is no abdominal tenderness.   Constitutional:       General: He is not in acute distress.     Appearance: He is underweight. He is ill-appearing. He is not toxic-appearing.   Pulmonary:      Effort: Tachypnea present. No accessory muscle usage, respiratory distress or accessory muscle usage.      Breath sounds: Decreased air movement present. Examination of the right-upper field reveals decreased breath sounds. Examination of the right-middle field reveals decreased breath sounds. Examination of the  right-lower field reveals decreased breath sounds. Examination of the left-lower field reveals decreased breath sounds. Decreased breath sounds present. No wheezing or rhonchi.   Neurological:      General: No focal deficit present.      Mental Status: He is lethargic.      Cranial Nerves: No facial asymmetry.      Comments: Wakes to voice, oriented to self and place.follows commands with all extremities  and Generalized weakness          Diagnostic Studies        Lab Results: I have reviewed the following results:     Medications:  Scheduled PRN   ascorbic acid, 500 mg, Daily  atorvastatin, 40 mg, Daily With Dinner  cefepime, 1,000 mg, Q24H  chlorhexidine, 15 mL, Q12H RASHAD  cholecalciferol, 2,000 Units, Daily  ferrous sulfate, 325 mg, Daily With Breakfast  insulin lispro, 1-5 Units, Q6H RASHAD  latanoprost, 1 drop, HS  pantoprazole, 40 mg, Q24H RASHAD  tamsulosin, 0.4 mg, Daily With Dinner  timolol, 1 drop, Daily      docusate sodium, 100 mg, BID PRN  vancomycin, 15 mg/kg, Daily PRN       Continuous    dextrose 5% lactated ringer's, 30 mL/hr, Last Rate: 30 mL/hr (02/26/25 2203)  norepinephrine, 1-30 mcg/min, Last Rate: 3 mcg/min (02/26/25 2004)         Labs:   CBC    Recent Labs     02/25/25  1430 02/26/25  0519   WBC 5.62 5.02   HGB 9.7* 9.0*   HCT 28.9* 27.3*   PLT 35* 38*     BMP    Recent Labs     02/25/25  1430 02/26/25  0519   SODIUM 133* 136   K 4.7 4.3   CL 99 103   CO2 22 20*   AGAP 12 13   * 103*   CREATININE 3.78* 3.60*   CALCIUM 7.6* 7.2*       Coags    Recent Labs     02/26/25  0519   INR 2.01*        Additional Electrolytes  Recent Labs     02/26/25  0519   MG 1.9   PHOS 4.6*          Blood Gas    No recent results  Recent Labs     02/25/25 1942   PHVEN 7.384   JOC0THQ 40.4*   PO2VEN 62.1*   GCT4PKZ 23.6*   BEVEN -1.4   [id number] 88.6*    LFTs  Recent Labs     02/25/25  1430   ALT 23   AST 26   ALKPHOS 130*   ALB 2.6*   TBILI 0.66       Infectious  Recent Labs     02/25/25  1732 02/26/25  0519    PROCALCITONI 0.34* 0.29*     Glucose  Recent Labs     02/25/25  1430 02/26/25  0519   GLUC 156* 121

## 2025-02-27 NOTE — QUICK NOTE
I had discussion with Son González regarding Yao's current medical condition. He is in agreement that at this time best way to support Yao is with noninvasive/minimal invasive methods and give him best chance to recover from current illness. He is not sure about enteral/parenteral nutrition. He is in agreement that if required he will consider enteral nutrition via nasogastric tube if that helps him in current condition but he is unsure (mostly reluctant) at this time to opt for PEG tube. Multiple opportunity provided during this conversation to González to see if he has any question. All his questions answered to his understanding. Psychosocial support provided. Patient remain level 3 DNR/DNI with continue current care.

## 2025-02-27 NOTE — SPEECH THERAPY NOTE
Speech-Language Pathology Bedside Swallow Evaluation      Patient Name: Yao Tipton    Today's Date: 2/27/2025     Problem List  Principal Problem:    Shock (HCC)  Active Problems:    Diabetes mellitus with peripheral artery disease (HCC)    Pericardial effusion    Chronic atrial fibrillation (HCC)    Acute kidney injury superimposed on chronic kidney disease  (HCC)    Thrombocytopenia (HCC)    Diarrhea    Hypothermia    Metabolic encephalopathy    Pleural effusion    Chronic combined systolic and diastolic CHF (congestive heart failure) (HCC)    UTI (urinary tract infection)    Nausea and vomiting    Bradycardia      Past Medical History  Past Medical History:   Diagnosis Date    Atrial fibrillation (HCC)     BPH (benign prostatic hyperplasia)     CHF (congestive heart failure) (HCC)     Chronic kidney disease     Coronary artery disease     Diabetes mellitus (HCC)     Hyperlipidemia     Hypertension     Hyponatremia 12/07/2024       Past Surgical History  Past Surgical History:   Procedure Laterality Date    CARDIAC CATHETERIZATION N/A 6/30/2023    Procedure: Cardiac pericardiocentesis;  Surgeon: Shanna Adaem DO;  Location: [facility];  Service: Cardiology    CARDIAC CATHETERIZATION N/A 6/30/2023    Procedure: Cardiac RHC;  Surgeon: Shanna Adame DO;  Location: [facility];  Service: Cardiology    EYE SURGERY      IR CHEST TUBE PLACEMENT  6/24/2023    IR CHEST TUBE PLACEMENT  7/28/2023    IR PLEURAL EFFUSION LONG-TERM CATHETER PLACEMENT  8/7/2023    IR PLEURAL EFFUSION LONG-TERM CATHETER REMOVAL  1/3/2024    IR THORACENTESIS  12/11/2023    SKIN CANCER EXCISION      TONSILLECTOMY         Summary   Pt presented as weak with poor respiratory support for speech and dysphonia with significant aspiration risk at this time.  No immediate s/s with the 1st tsps of puree and nectar thick liquid but as trials progressed, pt presented with weak cough and increased work of breathing and trial  tsps discontinued.     Recommended Diet: NPO or NPO except critical medications crushed  Aspiration precautions and swallowing strategies: upright posture, only feed when fully alert, and slow rate of feeding  Other Recommendations: Continue frequent oral care        Current Medical Status  Yao Tipton is a 84 y.o. with past medical history of combined heart failure (EF 45%), afib on eliquis, thrombocytopenia, CKD, HLD who presented from [facility] with decreased appetite, possible nausea and vomiting. On arrival he was found to be hypothermic and hypotensive.   DX: Shock (HCC)  Patient presents with hypotension, hypothermia. Likely secondary to septic shock in the setting of UTI. Hx of multiple UTI's. Also has a history of exudative pleural effusion in 2023. However, cannot rule out obstructive shock (hx of pericardial effusion s/p drainage in 2023) & hypovolemia at this time   LA 0.7, initial procal 0.34  UA with innumerable bacteria, WBC, negative nitrites, + large leuk. Hx of UTIs growing proteus. Most recently in January of this year secondary to urinary retention.  CXR: vascular congestion with small bilateral pleural effusions. No obvious consolidations  UTI (urinary tract infection)  History of incision of pericardium   Acute kidney injury superimposed on chronic kidney disease  (HCC)  Hypothermia  Thrombocytopenia (HCC)  Chronic combined systolic and diastolic CHF (congestive heart failure) (HCC)   Chronic atrial fibrillation (HCC)  Diabetes mellitus with peripheral artery disease (HCC)  Pleural effusion  Encephalopathy  Nausea and vomiting  Report of recent nausea/vomiting  Yellow bilious staining near mouth evident on arrival   PRN zofran  Aspiration precaution   Disposition: Critical care     Pt NPO and SLP Swallow Evaluation ordered at this time.     Current Precautions:  Fall, Aspiration (NPO), C diff      Allergies:  No known food allergies    Past medical history:  Please see H&P for  details    Special Studies:  2/25 CT of abd/pelvis: Diffuse mild colonic wall thickening which may be due to underdistention or colitis. Evaluation is limited due to lack of oral and intravenous contrast.  Urinary bladder is underdistended however appears somewhat thick walled. Correlation with urinalysis is recommended.  Partially imaged bilateral pleural effusions. Chronic right pleural thickening which may be on the basis of infection or other etiologies. Bibasilar opacities, likely atelectasis.  Cardiomegaly with moderate pericardial effusion.     7/31/23 VBS:  Pt presented as lethargic with moderate oral and mild pharyngeal dysphagia (delayed initiation with nectar and thin liquid) with no aspiration but risk present given extent of lethargy.  Pt begun on pureed food/nectar thick liquid s/p VBS and advanced to soft food with thin liquid within 1 week.    Social/Education/Vocational Hx:  Pt lives in SNF[facility]F ([facility])    Swallow Information   Current Risks for Dysphagia & Aspiration: AMS, weakness with poor respiratory support for speech, and dysphonia (breathy voice quality)  Current Symptoms/Concerns: delayed cough   Current Diet: NPO   Baseline Diet: regular diet and thin liquids      Baseline Assessment   Behavior/Cognition:  [place], waxing and waning arousal level  Speech/Language Status: able to follow commands inconsistently and limited verbal output  Patient Positioning: upright in bed  Pain Status/Interventions/Response to Interventions:  No report of or nonverbal indications of pain.       Swallow Mechanism Exam  Labial/Facial: symmetrical  Lingual: decreased strength  Velum: symmetrical but with yellow stain  Hard Palate: again mild yellow staining (?med related vs bile vs other)  Mandible: adequate ROM  Dentition: adequate  Vocal quality:breathy, weak, and dysphonic (max phonation was across 3 syllables, most often 1-2 syllables pf weak phonation)  Volitional Cough: weak   Respiratory Status: on RA  with O2 saturation of %    Consistencies Assessed and Performance   Consistencies Administered: 2 sips thin water, ~2 ozs nectar thick water, and ~2 ozs puree/applesauce    Oral Stage:   Bolus formation and transfer: mildly slowed with mild scattered lingual residue. Risk for reduced oral control.    Pharyngeal Stage:   Swallow Mechanics:  Swallowing initiation appeared prompt to min delayed.  Laryngeal rise was palpated and judged to be weak.  Delayed cough and change in respiratory status (increased work of breathing) noted with limited amounts provided.     Esophageal Concerns: unknown    Summary and Recommendations (see above)    Results Reviewed with: RN and CRNP     Treatment Recommended: yes     Frequency of treatment: as medically and clinically indicated    Patient Stated Goal: unable to state at this time    Dysphagia LTG  -Patient will demonstrate safe and effective oral intake (without overt s/s significant oral/pharyngeal dysphagia including s/s penetration or aspiration) for the highest appropriate diet level.     Short Term Goals:    -Patient will comply with ongoing clinical assessment and/or  Video/Modified Barium Swallow study for more complete assessment of swallowing anatomy/physiology/aspiration risk and to assess efficacy of treatment techniques so as to best guide treatment plan    -Additional goals to follow additional assessment.     Lea Godinez MS CCC-SLP  [place] License [id number]  [place] License [id number]

## 2025-02-27 NOTE — ASSESSMENT & PLAN NOTE
UA concerning for possible UTI  Patient has hx of frequent UTI's related to retention  Most recent UTI in January. Cultures grew proteus on multiple occasions. No hx of MDRO  Previously had victor in place, d/c'ed 1/8/25  Given cefepime in the ED, continue for now but can likely de-escalate to ceftriaxone  CT A/P bladder wall thicking. No hydro or obstruction   Urine culture pending  Urinary retention protocol  Straight cath'ed 2 x so far  Continue flomax

## 2025-02-27 NOTE — ASSESSMENT & PLAN NOTE
Episodes of bradycardia overnight. EKG showing Afib with slow ventricular rate  Temp 96.3, restarted on yola hugger  Hold AV donnie blocking agents  Continue to monitor on telemetry

## 2025-02-27 NOTE — ASSESSMENT & PLAN NOTE
History of pericardial effusion in June of 2023. Originally presented with concern for sepsis. Found to have a pericardial effusion and was transferred to [facility] and underwent pericardiocentesis on 6/30/23 by Dr. Adame. Unclear etiology per previous notes. Cultures negative however WBC 1,280 in pericardial fluid. Negative for malignancy  2/25: CT A/P showing moderate pericardial effusion   2/26 Echo : EF 60%, There is a moderate pericardial effusion. Glides mainly to apical area as well as right ventricular and right atrium with no evidence of tamponade. Pericardium is very thickened. Certainly we cannot rule out chronic constriction.   Eliquis currently on hold   Goal MAP >65

## 2025-02-27 NOTE — ASSESSMENT & PLAN NOTE
Lab Results   Component Value Date    EGFR 14 02/26/2025    EGFR 13 02/25/2025    EGFR 21 01/18/2025    CREATININE 3.60 (H) 02/26/2025    CREATININE 3.78 (H) 02/25/2025    CREATININE 2.60 (H) 01/18/2025       Creatinine 3.78 on admission  Baseline creatinine 2.2-2.5?  S/p IV resuscitation in the ED  Creatinine improving slowly   Hold home torsemide   Hx of urinary retention  CT A/P without hydronephrosis or evidence of obstruction   Close I&O  Urinary retention protocol  Continue flomax  Avoid nephrotoxins

## 2025-02-27 NOTE — PLAN OF CARE
Problem: Potential for Falls  Goal: Patient will remain free of falls  Description: INTERVENTIONS:  - Educate patient/family on patient safety including physical limitations  - Instruct patient to call for assistance with activity   - Consult OT/PT to assist with strengthening/mobility   - Keep Call bell within reach  - Keep bed low and locked with side rails adjusted as appropriate  - Keep care items and personal belongings within reach  - Initiate and maintain comfort rounds  - Make Fall Risk Sign visible to staff  - Offer Toileting every 2 Hours, in advance of need  - Initiate/Maintain bed/chair alarm  - Obtain necessary fall risk management equipment  - Apply yellow socks and bracelet for high fall risk patients  - Consider moving patient to room near nurses station  Outcome: Progressing     Problem: PAIN - ADULT  Goal: Verbalizes/displays adequate comfort level or baseline comfort level  Description: Interventions:  - Encourage patient to monitor pain and request assistance  - Assess pain using appropriate pain scale  - Administer analgesics based on type and severity of pain and evaluate response  - Implement non-pharmacological measures as appropriate and evaluate response  - Consider cultural and social influences on pain and pain management  - Notify physician/advanced practitioner if interventions unsuccessful or patient reports new pain  Outcome: Progressing     Problem: INFECTION - ADULT  Goal: Absence or prevention of progression during hospitalization  Description: INTERVENTIONS:  - Assess and monitor for signs and symptoms of infection  - Monitor lab/diagnostic results  - Monitor all insertion sites, i.e. indwelling lines, tubes, and drains  - Monitor endotracheal if appropriate and nasal secretions for changes in amount and color  - [place] appropriate cooling/warming therapies per order  - Administer medications as ordered  - Instruct and encourage patient and family to use good hand hygiene  technique  - Identify and instruct in appropriate isolation precautions for identified infection/condition  Outcome: Progressing     Problem: DISCHARGE PLANNING  Goal: Discharge to home or other facility with appropriate resources  Description: INTERVENTIONS:  - Identify barriers to discharge w/patient and caregiver  - Arrange for needed discharge resources and transportation as appropriate  - Identify discharge learning needs (meds, wound care, etc.)  - Arrange for interpretive services to assist at discharge as needed  - Refer to Case Management Department for coordinating discharge planning if the patient needs post-hospital services based on physician/advanced practitioner order or complex needs related to functional status, cognitive ability, or social support system  Outcome: Progressing     Problem: Knowledge Deficit  Goal: Patient/family/caregiver demonstrates understanding of disease process, treatment plan, medications, and discharge instructions  Description: Complete learning assessment and assess knowledge base.  Interventions:  - Provide teaching at level of understanding  - Provide teaching via preferred learning methods  Outcome: Progressing     Problem: NEUROSENSORY - ADULT  Goal: Achieves stable or improved neurological status  Description: INTERVENTIONS  - Monitor and report changes in neurological status  - Monitor vital signs such as temperature, blood pressure, glucose, and any other labs ordered   - Initiate measures to prevent increased intracranial pressure  - Monitor for seizure activity and implement precautions if appropriate      Outcome: Progressing     Problem: CARDIOVASCULAR - ADULT  Goal: Maintains optimal cardiac output and hemodynamic stability  Description: INTERVENTIONS:  - Monitor I/O, vital signs and rhythm  - Monitor for S/S and trends of decreased cardiac output  - Administer and titrate ordered vasoactive medications to optimize hemodynamic stability  - Assess quality of  pulses, skin color and temperature  - Assess for signs of decreased coronary artery perfusion  - Instruct patient to report change in severity of symptoms  Outcome: Not Progressing  Goal: Absence of cardiac dysrhythmias or at baseline rhythm  Description: INTERVENTIONS:  - Continuous cardiac monitoring, vital signs, obtain 12 lead EKG if ordered  - Administer antiarrhythmic and heart rate control medications as ordered  - Monitor electrolytes and administer replacement therapy as ordered  Outcome: Progressing     Problem: GASTROINTESTINAL - ADULT  Goal: Minimal or absence of nausea and/or vomiting  Description: INTERVENTIONS:  - Administer IV fluids if ordered to ensure adequate hydration  - Maintain NPO status until nausea and vomiting are resolved  - Nasogastric tube if ordered  - Administer ordered antiemetic medications as needed  - Provide nonpharmacologic comfort measures as appropriate  - Advance diet as tolerated, if ordered  - Consider nutrition services referral to assist patient with adequate nutrition and appropriate food choices  Outcome: Progressing  Goal: Maintains or returns to baseline bowel function  Description: INTERVENTIONS:  - Assess bowel function  - Encourage oral fluids to ensure adequate hydration  - Administer IV fluids if ordered to ensure adequate hydration  - Administer ordered medications as needed  - Encourage mobilization and activity  - Consider nutritional services referral to assist patient with adequate nutrition and appropriate food choices  Outcome: Progressing  Goal: Maintains adequate nutritional intake  Description: INTERVENTIONS:  - Monitor percentage of each meal consumed  - Identify factors contributing to decreased intake, treat as appropriate  - Assist with meals as needed  - Monitor I&O, weight, and lab values if indicated  - Obtain nutrition services referral as needed  Outcome: Not Progressing     Problem: GENITOURINARY - ADULT  Goal: Maintains or returns to baseline  urinary function  Description: INTERVENTIONS:  - Assess urinary function  - Encourage oral fluids to ensure adequate hydration if ordered  - Administer IV fluids as ordered to ensure adequate hydration  - Administer ordered medications as needed  - Offer frequent toileting  - Follow urinary retention protocol if ordered  Outcome: Not Progressing  Goal: Absence of urinary retention  Description: INTERVENTIONS:  - Assess patient’s ability to void and empty bladder  - Monitor I/O  - Bladder scan as needed  - Discuss with physician/AP medications to alleviate retention as needed  - Discuss catheterization for long term situations as appropriate  Outcome: Not Progressing     Problem: METABOLIC, FLUID AND ELECTROLYTES - ADULT  Goal: Electrolytes maintained within normal limits  Description: INTERVENTIONS:  - Monitor labs and assess patient for signs and symptoms of electrolyte imbalances  - Administer electrolyte replacement as ordered  - Monitor response to electrolyte replacements, including repeat lab results as appropriate  - Instruct patient on fluid and nutrition as appropriate  Outcome: Progressing  Goal: Fluid balance maintained  Description: INTERVENTIONS:  - Monitor labs   - Monitor I/O and WT  - Instruct patient on fluid and nutrition as appropriate  - Assess for signs & symptoms of volume excess or deficit  Outcome: Progressing     Problem: SKIN/TISSUE INTEGRITY - ADULT  Goal: Incision(s), wounds(s) or drain site(s) healing without S/S of infection  Description: INTERVENTIONS  - Assess and document dressing, incision, wound bed, drain sites and surrounding tissue  - Provide patient and family education  - Perform skin care/dressing changes every shift  Outcome: Progressing  Goal: Pressure injury heals and does not worsen  Description: Interventions:  - Implement low air loss mattress or specialty surface (Criteria met)  - Apply silicone foam dressing  - Instruct/assist with weight shifting every 90 minutes  when in chair   - Limit chair time to 4 hour intervals  - Use special pressure reducing interventions such as EHOB when in chair   - Apply fecal or urinary incontinence containment device   - Perform passive or active ROM every 2 hours  - Turn and reposition patient & offload bony prominences every 2 hours   - Utilize friction reducing device or surface for transfers   - Consider consults to  interdisciplinary teams   - Use incontinent care products after each incontinent episode   - Consider nutrition services referral as needed  Outcome: Progressing     Problem: Nutrition/Hydration-ADULT  Goal: Nutrient/Hydration intake appropriate for improving, restoring or maintaining nutritional needs  Description: Monitor and assess patient's nutrition/hydration status for malnutrition. Collaborate with interdisciplinary team and initiate plan and interventions as ordered.  Monitor patient's weight and dietary intake as ordered or per policy. Utilize nutrition screening tool and intervene as necessary. Determine patient's food preferences and provide high-protein, high-caloric foods as appropriate.     INTERVENTIONS:  - Monitor oral intake, urinary output, labs, and treatment plans  - Assess nutrition and hydration status and recommend course of action  - Evaluate amount of meals eaten  - Assist patient with eating if necessary   - Allow adequate time for meals  - Recommend/ encourage appropriate diets, oral nutritional supplements, and vitamin/mineral supplements  - Order, calculate, and assess calorie counts as needed  - Recommend, monitor, and adjust tube feedings and TPN/PPN based on assessed needs  - Assess need for intravenous fluids  - Provide specific nutrition/hydration education as appropriate  - Include patient/family/caregiver in decisions related to nutrition  Outcome: Not Progressing     Problem: Prexisting or High Potential for Compromised Skin Integrity  Goal: Skin integrity is maintained or  improved  Description: INTERVENTIONS:  - Identify patients at risk for skin breakdown  - Assess and monitor skin integrity  - Assess and monitor nutrition and hydration status  - Monitor labs   - Assess for incontinence   - Turn and reposition patient  - Assist with mobility/ambulation  - Relieve pressure over bony prominences  - Avoid friction and shearing  - Provide appropriate hygiene as needed including keeping skin clean and dry  - Evaluate need for skin moisturizer/barrier cream  - Collaborate with interdisciplinary team   - Patient/family teaching  - Consider wound care consult   Outcome: Progressing

## 2025-02-27 NOTE — ASSESSMENT & PLAN NOTE
Lab Results   Component Value Date    HGBA1C 7.7 (H) 12/07/2024       Recent Labs     02/26/25  1810 02/26/25  1844 02/26/25  2146 02/26/25  2346   POCGLU 49* 84 70 81       Blood Sugar Average: Last 72 hrs:  (P) 71.6    Hold home amaryl  Continue SSI Q6 while NPO  Goal BG <180

## 2025-02-27 NOTE — ASSESSMENT & PLAN NOTE
Wt Readings from Last 3 Encounters:   02/26/25 66.2 kg (146 lb)   01/14/25 69.9 kg (154 lb 1.6 oz)   01/06/25 70.3 kg (155 lb)       Most recent Echo 12/9/24: EF 45%, abnormal diastolic dysfunction, mildly reduced RV function.   Home meds: torsemide 40mg BID  Lower extremity edema present but mucus membranes appear dry, weight down 4KG from one month ago    S/p fluid resuscitation in the ED  CXR consistent with vascular congestion and small pleural effusions  Hold diuretics for now given hypotension, concern for possible sepsis  Consideration for resuming as soon as able   Close I&Os  Daily weights

## 2025-02-27 NOTE — PLAN OF CARE
Problem: PAIN - ADULT  Goal: Verbalizes/displays adequate comfort level or baseline comfort level  Description: Interventions:  - Encourage patient to monitor pain and request assistance  - Assess pain using appropriate pain scale  - Administer analgesics based on type and severity of pain and evaluate response  - Implement non-pharmacological measures as appropriate and evaluate response  - Consider cultural and social influences on pain and pain management  - Notify physician/advanced practitioner if interventions unsuccessful or patient reports new pain  Outcome: Progressing     Problem: INFECTION - ADULT  Goal: Absence or prevention of progression during hospitalization  Description: INTERVENTIONS:  - Assess and monitor for signs and symptoms of infection  - Monitor lab/diagnostic results  - Monitor all insertion sites, i.e. indwelling lines, tubes, and drains  - Monitor endotracheal if appropriate and nasal secretions for changes in amount and color  - [place] appropriate cooling/warming therapies per order  - Administer medications as ordered  - Instruct and encourage patient and family to use good hand hygiene technique  - Identify and instruct in appropriate isolation precautions for identified infection/condition  Outcome: Progressing     Problem: DISCHARGE PLANNING  Goal: Discharge to home or other facility with appropriate resources  Description: INTERVENTIONS:  - Identify barriers to discharge w/patient and caregiver  - Arrange for needed discharge resources and transportation as appropriate  - Identify discharge learning needs (meds, wound care, etc.)  - Arrange for interpretive services to assist at discharge as needed  - Refer to Case Management Department for coordinating discharge planning if the patient needs post-hospital services based on physician/advanced practitioner order or complex needs related to functional status, cognitive ability, or social support system  Outcome: Progressing      Problem: Knowledge Deficit  Goal: Patient/family/caregiver demonstrates understanding of disease process, treatment plan, medications, and discharge instructions  Description: Complete learning assessment and assess knowledge base.  Interventions:  - Provide teaching at level of understanding  - Provide teaching via preferred learning methods  Outcome: Progressing     Problem: NEUROSENSORY - ADULT  Goal: Achieves stable or improved neurological status  Description: INTERVENTIONS  - Monitor and report changes in neurological status  - Monitor vital signs such as temperature, blood pressure, glucose, and any other labs ordered   - Initiate measures to prevent increased intracranial pressure  - Monitor for seizure activity and implement precautions if appropriate      Outcome: Progressing

## 2025-02-27 NOTE — ASSESSMENT & PLAN NOTE
"Episodes of diarrhea in the ED  C diff positive, EIA negative  Enteric stool panel negative   CT \"Diffuse mild colonic wall thickening which may be due to underdistention or colitis. Evaluation is limited due to lack of oral and intravenous contrast.\"  Monitor output   "

## 2025-02-27 NOTE — PROGRESS NOTES
Yao Tipton is a 84 y.o. male who is currently ordered Vancomycin IV with management by the Pharmacy Consult service.  Relevant clinical data and objective / subjective history reviewed.  Vancomycin Assessment:  Indication and Goal AUC/Trough: Pneumonia (goal -600, trough >10), -600, trough >10  Clinical Status: stable  Micro:     Renal Function:  SCr: 3.76 mg/dL  CrCl: 13.4 mL/min  Renal replacement: Not on dialysis  Days of Therapy: 2  Current Dose: Loading dose: 1750 mg (25 mg/kg) IV x 1 dose  Vancomycin Plan:  New Dosin mg IV dailyprn when trough </= 15(pulse dosing)  Trough: 14.7 mcg/mL  Patient will receive Vancomycin 1000 mg IV x 1 dose today  Next Level: 24 at 0600  Renal Function Monitoring: Daily BMP and UOP  Pharmacy will continue to follow closely for s/sx of nephrotoxicity, infusion reactions and appropriateness of therapy.  BMP and CBC will be ordered per protocol. We will continue to follow the patient’s culture results and clinical progress daily.    Meghann Sweeney, Pharmacist

## 2025-02-27 NOTE — ASSESSMENT & PLAN NOTE
Patient alert and oriented on arrival to the ED, goals of care discussion held with patient and ED physician  On my exam patient wakes, can answer one word answers but appears sleepy.non-focal exam  Likely metabolic encephalopathy in the setting of UTI  VBG without hypercarbia  Ammonia 29  TSH 9  Consider CT head if patient remains encephalopathic  Hold sedating meds   [facility] ICU  Sleep hygiene

## 2025-02-27 NOTE — ASSESSMENT & PLAN NOTE
Patient presents with hypotension, hypothermia. Likely secondary to septic shock in the setting of UTI. Hx of multiple UTI's. Also has a history of exudative pleural effusion in 2023. However, cannot rule out obstructive shock (hx of pericardial effusion s/p drainage in 2023) & hypovolemia at this time   LA 0.7, initial procal 0.34  UA with innumerable bacteria, WBC, negative nitrites, + large leuk. Hx of UTIs growing proteus. Most recently in January of this year secondary to urinary retention.  CXR: vascular congestion with small bilateral pleural effusions. No obvious consolidations  CT A/P: Diffuse mild colonic wall thickening which may be due to underdistention or colitis. Evaluation is limited due to lack of oral and intravenous contrast.Urinary bladder is underdistended however appears somewhat thick walled. Correlation with urinalysis is recommended.Partially imaged bilateral pleural effusions. Chronic right pleural thickening which may be on the basis of infection or other etiologies. Bibasilar opacities, likely atelectasis.Cardiomegaly with moderate pericardial effusion.   COVID/Flu negative on rapid antigen test  C Diff + but EIA negative suggesting colonization, no active infection   Stool enteric panel negative  BC negative x 24  UA pending   TSH 9, free T4 pending  Given >30mL/kg in the ED. 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

## 2025-02-28 NOTE — ASSESSMENT & PLAN NOTE
History of pericardial effusion in June of 2023. Originally presented with concern for sepsis. Found to have a pericardial effusion and was transferred to [facility] and underwent pericardiocentesis on 6/30/23 by Dr. Adame. Unclear etiology per previous notes. Cultures negative however WBC 1,280 in pericardial fluid. Negative for malignancy  2/25: CT A/P showing moderate pericardial effusion   2/26 Echo : EF 60%, There is a moderate pericardial effusion. Glides mainly to apical area as well as right ventricular and right atrium with no evidence of tamponade. Pericardium is very thickened. Certainly we cannot rule out chronic constriction.   Eliquis currently on hold   Given rising creatinine, consider limited echo to assess for increased pericardial effusion /tamponade   Goal MAP >65

## 2025-02-28 NOTE — PROGRESS NOTES
Progress Note - Critical Care/ICU   Name: Yao Tipton 84 y.o. male I MRN: [id number]  Unit/Bed#: ICU 05 I Date of Admission: 2/25/2025   Date of Service: 2/27/2025 I Hospital Day: 2      Assessment & Plan  Septic shock (HCC)  Patient presents with hypotension, hypothermia. Likely secondary to septic shock in the setting of UTI. Hx of multiple UTI's. Also has a history of exudative pleural effusion in 2023. However, cannot rule out obstructive shock (hx of pericardial effusion s/p drainage in 2023) & hypovolemia at this time   LA 0.7, initial procal 0.34  UA with innumerable bacteria, WBC, negative nitrites, + large leuk. Hx of UTIs growing proteus. Most recently in January of this year secondary to urinary retention.  CXR: vascular congestion with small bilateral pleural effusions. No obvious consolidations  CT A/P: Diffuse mild colonic wall thickening which may be due to underdistention or colitis. Evaluation is limited due to lack of oral and intravenous contrast.Urinary bladder is underdistended however appears somewhat thick walled. Correlation with urinalysis is recommended.Partially imaged bilateral pleural effusions. Chronic right pleural thickening which may be on the basis of infection or other etiologies. Bibasilar opacities, likely atelectasis.Cardiomegaly with moderate pericardial effusion.   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 Cultures grew proteus on multiple occasions. No hx of MDRO  Previously had victor in place, d/c'ed 1/8/25  Given cefepime in the ED, continue for now  CT A/P bladder wall thicking. No hydro or obstruction   Urine culture growing citrobacter. Sensitivities pending   BC 1/2 growing GNR  Failed urinary retention protocol, victor placed 2/27  Continue flomax   Pericardial effusion  History of pericardial effusion in June of 2023. Originally presented with concern for sepsis. Found to have a pericardial effusion and was transferred to [facility] and underwent pericardiocentesis on 6/30/23 by Dr. Adame. Unclear etiology per previous notes. Cultures negative however WBC 1,280 in pericardial fluid. Negative for malignancy  2/25: CT A/P showing moderate pericardial effusion   2/26 Echo : EF 60%, There is a moderate pericardial effusion. Glides mainly to apical area as well as right ventricular and right atrium with no evidence of tamponade. Pericardium is very thickened. Certainly we cannot rule out chronic constriction.   Eliquis currently on hold   Given rising creatinine, consider limited echo to assess for increased pericardial effusion /tamponade   Goal MAP >65    Acute kidney injury superimposed on chronic kidney disease  (HCC)  Lab Results   Component Value Date    EGFR 13 02/27/2025    EGFR 14 02/26/2025    EGFR 13 02/25/2025    CREATININE 3.76 (H) 02/27/2025    CREATININE 3.60 (H) 02/26/2025    CREATININE 3.78 (H) 02/25/2025       Creatinine 3.78 on admission  Baseline creatinine 2.2-2.5?  S/p IV resuscitation in the ED  CT A/P without hydronephrosis or evidence of obstruction   Creatinine initially improved but again increased yesterday   Victor placed 2/27 after failing urinary retention protocol   Urine output currently adequate  Close I&O  Continue flomax  Avoid  "nephrotoxins   Hypothermia  Hypothermia, likely secondary to sepsis  given 50mg solucortef in the ED, cortisol level unfortunately obtained after hydrocortisone administration   TSH 9, free T4 pending   2/27 hydrocortisone 50mg Q6 initiated due to refractory hypothermia, hypotension and hypoglycemia   Ruben resendiz to achieve normothermia  Thrombocytopenia (HCC)  Platelets 35K on arrival  Baseline platelets   Thrombocytopenia possibly in the setting of sepsis  Hold eliquis and DVT ppx for now  Consider resuming when platelets >50K  Monitor for signs/symptoms of bleeding  Continue to trend   Chronic combined systolic and diastolic CHF (congestive heart failure) (HCC)  Wt Readings from Last 3 Encounters:   02/27/25 67.4 kg (148 lb 9.4 oz)   01/14/25 69.9 kg (154 lb 1.6 oz)   01/06/25 70.3 kg (155 lb)       Most recent Echo 12/9/24: EF 45%, abnormal diastolic dysfunction, mildly reduced RV function.   Home meds: torsemide 40mg BID  Lower extremity edema present but mucus membranes appear dry, weight down 4KG from one month ago    S/p fluid resuscitation in the ED  CXR consistent with vascular congestion and small pleural effusions  Hold diuretics for now given hypotension, concern for possible sepsis  Daily consideration for diuretics   Close I&Os  Daily weights      Chronic atrial fibrillation (HCC)  Chronic afib on eliquis  Not on rate control at this time  Currently HR in the 60s  Hold eliquis secondary to thrombocytopenia   Diarrhea  Episodes of diarrhea in the ED  C diff positive, EIA negative  Enteric stool panel negative   CT \"Diffuse mild colonic wall thickening which may be due to underdistention or colitis. Evaluation is limited due to lack of oral and intravenous contrast.\"  Monitor output   Diabetes mellitus with peripheral artery disease (HCC)  Lab Results   Component Value Date    HGBA1C 7.7 (H) 12/07/2024       Recent Labs     02/27/25  0518 02/27/25  0556 02/27/25  1145 02/27/25  1753 " "  POCGLU 65 155* 123 149*       Blood Sugar Average: Last 72 hrs:  (P) 94.3087391643945730    Hold home amaryl  Continue SSI Q6   Goal BG <180  Pleural effusion  Small bilateral pleural effusions present on CXR  CT with evidence of \"Partially imaged bilateral pleural effusions. Chronic right pleural thickening which may be on the basis of infection or other etiologies. Bibasilar opacities, likely atelectasis. '  Hx of exudative effusion s/p chest tube placement and tunneled pleural drain placed in 2023, s/p removal   Cultures negative, cytology negative  Thought to be hydropneumothorax ?  Respiratory status currently stable on room air   Metabolic encephalopathy  Patient alert and oriented on arrival to the ED, goals of care discussion held with patient and ED physician  Likely metabolic encephalopathy in the setting of UTI  VBG without hypercarbia  Ammonia 29  TSH 9  Mental status improving, oriented to \"hospital\", person but not time   Hold sedating meds   CAM ICU  Sleep hygiene   Nausea and vomiting (Resolved: 2/27/2025)  Report of recent nausea/vomiting  Yellow bilious staining near mouth evident on arrival   PRN zofran  Aspiration precaution   Bradycardia (Resolved: 2/27/2025)  Episodes of bradycardia overnight. EKG showing Afib with slow ventricular rate  Temp 96.3, restarted on yola hugger  Hold AV donnie blocking agents  Continue to monitor on telemetry   Disposition: Critical care    ICU Core Measures     A: Assess, Prevent, and Manage Pain Has pain been assessed? Yes  Need for changes to pain regimen? No   B: Both SAT/SAT  N/A   C: Choice of Sedation RASS Goal: N/A patient not on sedation  Need for changes to sedation or analgesia regimen? NA   D: Delirium CAM-ICU: Negative   E: Early Mobility  Plan for early mobility? Yes   F: Family Engagement Plan for family engagement today? Yes       Antibiotic Review: Awaiting culture results.     Review of Invasive Devices:    Dupont Plan: Continue for accurate I/O " monitoring for 48 hours        Prophylaxis:  VTE VTE covered by:    None       Stress Ulcer  covered byfamotidine (PEPCID) 10 mg tablet [[id number]] (Long-Term Med), pantoprazole (PROTONIX) injection 40 mg [[id number]]         24 Hour Events : victor placed after failing urinary retention protocol. Started on solucortef for refractory hypothermia, hypotension and hypoglycemia. Levophed requirements improving. Mental status improving. Urine grew citrobacter. 1/2 BC growing GNR. D5LR D/Ash overnight.     Subjective   Review of Systems: Review of Systems    Objective :                   Vitals I/O      Most Recent Min/Max in 24hrs   Temp (!) 97.2 °F (36.2 °C) Temp  Min: 95.7 °F (35.4 °C)  Max: 97.6 °F (36.4 °C)   Pulse 74 Pulse  Min: 59  Max: 76   Resp 16 Resp  Min: 12  Max: 23   BP 93/54 BP  Min: 80/51  Max: 148/58   O2 Sat 99 % SpO2  Min: 93 %  Max: 99 %      Intake/Output Summary (Last 24 hours) at 2/27/2025 2309  Last data filed at 2/27/2025 1830  Gross per 24 hour   Intake 1583.01 ml   Output 700 ml   Net 883.01 ml       Diet NPO    Invasive Monitoring           Physical Exam   Physical Exam  Eyes:      Extraocular Movements: Extraocular movements intact.      Pupils: Pupils are equal, round, and reactive to light.   Skin:     General: Skin is warm and dry.      Comments: Bilateral lower extremity erythema, swelling. Dressings in place on shins. Scattered upper extremity bruising.    HENT:      Head: Normocephalic and atraumatic.   Musculoskeletal:      Right lower leg: 3+ Edema present.      Left lower leg: 3+ Edema present.   Abdominal:      Palpations: Abdomen is soft.   Constitutional:       Appearance: He is ill-appearing.   Pulmonary:      Effort: Pulmonary effort is normal. No accessory muscle usage, respiratory distress or accessory muscle usage.      Breath sounds: Decreased breath sounds present. No wheezing.      Comments: Poor inspiratory effort  Neurological:      Mental Status: He is alert.       "Comments: Follows commands with all extremities  and Answers \"where am I\" to orientation questions   Genitourinary/Anorectal:  Dupont present.        Diagnostic Studies        Lab Results: I have reviewed the following results:     Medications:  Scheduled PRN   atorvastatin, 40 mg, Daily With Dinner  cefepime, 1,000 mg, Q24H  chlorhexidine, 15 mL, Q12H RASHAD  hydrocortisone sodium succinate, 50 mg, Q6H RASHAD  insulin lispro, 1-5 Units, Q6H RASHAD  latanoprost, 1 drop, HS  midodrine, 5 mg, TID AC  pantoprazole, 40 mg, Q24H RASHAD  tamsulosin, 0.4 mg, Daily With Dinner  timolol, 1 drop, Daily      docusate sodium, 100 mg, BID PRN       Continuous    norepinephrine, 1-30 mcg/min, Last Rate: 2 mcg/min (02/27/25 2216)         Labs:   CBC    Recent Labs     02/26/25 0519 02/27/25 0514   WBC 5.02 4.95   HGB 9.0* 8.4*   HCT 27.3* 26.1*   PLT 38* 42*     BMP    Recent Labs     02/26/25 0519 02/27/25 0514   SODIUM 136 137   K 4.3 3.9    104   CO2 20* 20*   AGAP 13 13   * 104*   CREATININE 3.60* 3.76*   CALCIUM 7.2* 7.5*       Coags    Recent Labs     02/26/25 0519   INR 2.01*        Additional Electrolytes  Recent Labs     02/26/25 0519 02/27/25 0514   MG 1.9 2.4   PHOS 4.6*  --    CAIONIZED  --  1.01*          Blood Gas    No recent results  No recent results LFTs  Recent Labs     02/27/25 0514   ALT 18   AST 16   ALKPHOS 104   ALB 2.3*   TBILI 0.49       Infectious  Recent Labs     02/26/25 0519   PROCALCITONI 0.29*     Glucose  Recent Labs     02/26/25 0519 02/27/25 0514   GLUC 121 68        "

## 2025-02-28 NOTE — SPEECH THERAPY NOTE
"SLP Progress Note    Patient Name: Yao Tipton  Today's Date: 2/28/2025     Problem List  Principal Problem:    Septic shock (HCC)  Active Problems:    Diabetes mellitus with peripheral artery disease (HCC)    Pericardial effusion    Chronic atrial fibrillation (HCC)    Acute kidney injury superimposed on chronic kidney disease  (HCC)    Thrombocytopenia (HCC)    Diarrhea    Hypothermia    Metabolic encephalopathy    Pleural effusion    Chronic combined systolic and diastolic CHF (congestive heart failure) (HCC)    UTI (urinary tract infection)    Subjective:  Sufficiently alert for ongoing assessment.     Objective:  Pt was seen for diagnostic dysphagia therapy to assess readiness for oral diet. Pt was alert and positioned upright.    Pt was assessed with 3-4 ozs of applesauce and 8 ozs water. Bolus formation and transfer were effective mildly slowed.  Swallow initiation appeared prompt. Laryngeal rise was weak by palpation. No immediate coughing, throat clearing, c/o stasis, multiple swallows, change in vocal quality noted c po intake today.  C/O \"tired\" and \"no more\" after limited amount of food.    Plan/Recommendations:  Consider initiation of pureed diet with thin liquid.   SLP to follow up  (min of 2x weekly) to ensure safe po intake and guide safe diet advancement over time.     Lea Godinez MS [facility]-SLP  [place] License [id number]       "

## 2025-02-28 NOTE — PLAN OF CARE
Consider initiation of pureed diet with thin liquid.   SLP to follow up  (min of 2x weekly) to ensure safe po intake and guide safe diet advancement over time.

## 2025-02-28 NOTE — PLAN OF CARE
Problem: Potential for Falls  Goal: Patient will remain free of falls  Description: INTERVENTIONS:  - Educate patient/family on patient safety including physical limitations  - Instruct patient to call for assistance with activity   - Consult OT/PT to assist with strengthening/mobility   - Keep Call bell within reach  - Keep bed low and locked with side rails adjusted as appropriate  - Keep care items and personal belongings within reach  - Initiate and maintain comfort rounds  - Make Fall Risk Sign visible to staff  - Offer Toileting every 2 Hours, in advance of need  - Initiate/Maintain bed/chair alarm  - Obtain necessary fall risk management equipment  - Apply yellow socks and bracelet for high fall risk patients  - Consider moving patient to room near nurses station  Outcome: Progressing     Problem: PAIN - ADULT  Goal: Verbalizes/displays adequate comfort level or baseline comfort level  Description: Interventions:  - Encourage patient to monitor pain and request assistance  - Assess pain using appropriate pain scale  - Administer analgesics based on type and severity of pain and evaluate response  - Implement non-pharmacological measures as appropriate and evaluate response  - Consider cultural and social influences on pain and pain management  - Notify physician/advanced practitioner if interventions unsuccessful or patient reports new pain  Outcome: Progressing     Problem: INFECTION - ADULT  Goal: Absence or prevention of progression during hospitalization  Description: INTERVENTIONS:  - Assess and monitor for signs and symptoms of infection  - Monitor lab/diagnostic results  - Monitor all insertion sites, i.e. indwelling lines, tubes, and drains  - Monitor endotracheal if appropriate and nasal secretions for changes in amount and color  - [place] appropriate cooling/warming therapies per order  - Administer medications as ordered  - Instruct and encourage patient and family to use good hand hygiene  technique  - Identify and instruct in appropriate isolation precautions for identified infection/condition  Outcome: Progressing     Problem: NEUROSENSORY - ADULT  Goal: Achieves stable or improved neurological status  Description: INTERVENTIONS  - Monitor and report changes in neurological status  - Monitor vital signs such as temperature, blood pressure, glucose, and any other labs ordered   - Initiate measures to prevent increased intracranial pressure  - Monitor for seizure activity and implement precautions if appropriate      Outcome: Progressing

## 2025-02-28 NOTE — ASSESSMENT & PLAN NOTE
Lab Results   Component Value Date    HGBA1C 7.7 (H) 12/07/2024       Recent Labs     02/27/25  0518 02/27/25  0556 02/27/25  1145 02/27/25  1753   POCGLU 65 155* 123 149*       Blood Sugar Average: Last 72 hrs:  (P) 94.6743923684557903    Hold home amaryl  Continue SSI Q6   Goal BG <180

## 2025-02-28 NOTE — ASSESSMENT & PLAN NOTE
"Patient alert and oriented on arrival to the ED, goals of care discussion held with patient and ED physician  Likely metabolic encephalopathy in the setting of UTI  VBG without hypercarbia  Ammonia 29  TSH 9  Mental status improving, oriented to \"hospital\", person but not time   Hold sedating meds   CAM ICU  Sleep hygiene   "

## 2025-02-28 NOTE — CONSULTS
Vancomycin IV Pharmacy-to-Dose Consultation     Vancomycin has been discontinued.  Pharmacy will sign off.  Please contact or re-consult with questions.    Meghann Sweeney, Pharmacist

## 2025-02-28 NOTE — ASSESSMENT & PLAN NOTE
Wt Readings from Last 3 Encounters:   02/27/25 67.4 kg (148 lb 9.4 oz)   01/14/25 69.9 kg (154 lb 1.6 oz)   01/06/25 70.3 kg (155 lb)       Most recent Echo 12/9/24: EF 45%, abnormal diastolic dysfunction, mildly reduced RV function.   Home meds: torsemide 40mg BID  Lower extremity edema present but mucus membranes appear dry, weight down 4KG from one month ago    S/p fluid resuscitation in the ED  CXR consistent with vascular congestion and small pleural effusions  Hold diuretics for now given hypotension, concern for possible sepsis  Daily consideration for diuretics   Close I&Os  Daily weights

## 2025-02-28 NOTE — ASSESSMENT & PLAN NOTE
Hypothermia, likely secondary to sepsis  given 50mg solucortef in the ED, cortisol level unfortunately obtained after hydrocortisone administration   TSH 9, free T4 pending   2/27 hydrocortisone 50mg Q6 initiated due to refractory hypothermia, hypotension and hypoglycemia   Ruben martín to achieve normothermia

## 2025-02-28 NOTE — ASSESSMENT & PLAN NOTE
Patient presents with hypotension, hypothermia. Likely secondary to septic shock in the setting of UTI. Hx of multiple UTI's. Also has a history of exudative pleural effusion in 2023. However, cannot rule out obstructive shock (hx of pericardial effusion s/p drainage in 2023) & hypovolemia at this time   LA 0.7, initial procal 0.34  UA with innumerable bacteria, WBC, negative nitrites, + large leuk. Hx of UTIs growing proteus. Most recently in January of this year secondary to urinary retention.  CXR: vascular congestion with small bilateral pleural effusions. No obvious consolidations  CT A/P: Diffuse mild colonic wall thickening which may be due to underdistention or colitis. Evaluation is limited due to lack of oral and intravenous contrast.Urinary bladder is underdistended however appears somewhat thick walled. Correlation with urinalysis is recommended.Partially imaged bilateral pleural effusions. Chronic right pleural thickening which may be on the basis of infection or other etiologies. Bibasilar opacities, likely atelectasis.Cardiomegaly with moderate pericardial effusion.   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

## 2025-02-28 NOTE — ASSESSMENT & PLAN NOTE
Lab Results   Component Value Date    EGFR 13 02/27/2025    EGFR 14 02/26/2025    EGFR 13 02/25/2025    CREATININE 3.76 (H) 02/27/2025    CREATININE 3.60 (H) 02/26/2025    CREATININE 3.78 (H) 02/25/2025       Creatinine 3.78 on admission  Baseline creatinine 2.2-2.5?  S/p IV resuscitation in the ED  CT A/P without hydronephrosis or evidence of obstruction   Creatinine initially improved but again increased yesterday   Dupont placed 2/27 after failing urinary retention protocol   Urine output currently adequate  Close I&O  Continue flomax  Avoid nephrotoxins

## 2025-02-28 NOTE — ASSESSMENT & PLAN NOTE
UA concerning for possible UTI  Patient has hx of frequent UTI's related to retention  Most recent UTI in January. Cultures grew proteus on multiple occasions. No hx of MDRO  Previously had victor in place, d/c'ed 1/8/25  Given cefepime in the ED, continue for now  CT A/P bladder wall thicking. No hydro or obstruction   Urine culture growing citrobacter. Sensitivities pending   BC 1/2 growing GNR  Failed urinary retention protocol, victor placed 2/27  Continue flomax

## 2025-02-28 NOTE — ASSESSMENT & PLAN NOTE
"Small bilateral pleural effusions present on CXR  CT with evidence of \"Partially imaged bilateral pleural effusions. Chronic right pleural thickening which may be on the basis of infection or other etiologies. Bibasilar opacities, likely atelectasis. '  Hx of exudative effusion s/p chest tube placement and tunneled pleural drain placed in 2023, s/p removal   Cultures negative, cytology negative  Thought to be hydropneumothorax ?  Respiratory status currently stable on room air   "

## 2025-02-28 NOTE — ASSESSMENT & PLAN NOTE
Platelets 35K on arrival  Baseline platelets   Thrombocytopenia possibly in the setting of sepsis  Hold eliquis and DVT ppx for now  Consider resuming when platelets >50K  Monitor for signs/symptoms of bleeding  Continue to trend

## 2025-03-01 PROBLEM — E87.29 HIGH ANION GAP METABOLIC ACIDOSIS: Status: ACTIVE | Noted: 2025-03-01

## 2025-03-01 NOTE — ASSESSMENT & PLAN NOTE
Hypothermia, likely secondary to sepsis  given 50mg solucortef in the ED, cortisol level unfortunately obtained after hydrocortisone administration   TSH 9, free T4 0.83   2/27 hydrocortisone 50mg Q6 initiated due to refractory hypothermia, hypotension and hypoglycemia   Ruben martín PRN to achieve normothermia

## 2025-03-01 NOTE — ASSESSMENT & PLAN NOTE
Chronic afib on eliquis  Not on rate control at this time  Currently HR in the 80-90s  Hold eliquis secondary to thrombocytopenia

## 2025-03-01 NOTE — ASSESSMENT & PLAN NOTE
UA concerning for UTI  Patient has hx of frequent UTI's related to retention  Most recent UTI in January. Cultures grew proteus on multiple occasions. No hx of MDRO  Previously had victor in place, d/c'ed 1/8/25  CT A/P bladder wall thicking. No hydro or obstruction   Continue Cefepime D5  Urine culture growing citrobacter. Sensitive to Ceftriaxone- can likely de-escalate today   BC 1/2 growing Corynebacterium- likely contaminant  Failed urinary retention protocol, victor placed 2/27  Continue flomax

## 2025-03-01 NOTE — ASSESSMENT & PLAN NOTE
Lab Results   Component Value Date    HGBA1C 7.7 (H) 12/07/2024       Recent Labs     02/28/25  1226 02/28/25  1656 02/28/25 2033 03/01/25  0004   POCGLU 152* 162* 200* 243*       Blood Sugar Average: Last 72 hrs:  (P) 126.8481649119527437    Hold home amaryl  Continue SSI Q6   Goal BG <180

## 2025-03-01 NOTE — ASSESSMENT & PLAN NOTE
Lab Results   Component Value Date    HGBA1C 7.7 (H) 12/07/2024   -- Hemoglobin A1c 7.7  -- Insulin management as per critical care    Recent Labs     02/28/25 2033 03/01/25  0004 03/01/25 0729 03/01/25  1101   POCGLU 200* 243* 239* 228*       Blood Sugar Average: Last 72 hrs:  (P) 139.5625

## 2025-03-01 NOTE — ASSESSMENT & PLAN NOTE
Patient presents with hypotension, hypothermia. Likely secondary to septic shock in the setting of UTI. Hx of multiple UTI's. Also has a history of exudative pleural effusion in 2023. However, cannot rule out obstructive shock (hx of pericardial effusion s/p drainage in 2023) & hypovolemia at this time   LA 0.7, initial procal 0.34  UA with innumerable bacteria, WBC, negative nitrites, + large leuk. Hx of UTIs w/ proteus. Most recently in January of this year secondary to urinary retention.  CXR: vascular congestion with small bilateral pleural effusions. No obvious consolidations  CT A/P: Diffuse mild colonic wall thickening which may be due to underdistention or colitis. Evaluation is limited due to lack of oral and intravenous contrast.Urinary bladder is underdistended however appears somewhat thick walled. Correlation with urinalysis is recommended.Partially imaged bilateral pleural effusions. Chronic right pleural thickening which may be on the basis of infection or other etiologies. Bibasilar opacities, likely atelectasis.Cardiomegaly with moderate pericardial effusion.   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

## 2025-03-01 NOTE — ASSESSMENT & PLAN NOTE
"Small bilateral pleural effusions present on CXR  CT with evidence of \"Partially imaged bilateral pleural effusions. Chronic right pleural thickening which may be on the basis of infection or other etiologies. Bibasilar opacities, likely atelectasis. '  Hx of exudative effusion s/p chest tube placement and tunneled pleural drain placed in 2023, s/p removal   Cultures negative, cytology negative  Thought to be hydropneumothorax ?  Respiratory status currently stable on RA  "

## 2025-03-01 NOTE — CONSULTS
NEPHROLOGY HOSPITAL CONSULTATION   Yao Tipton 84 y.o. male MRN: [id number]  Unit/Bed#: ICU 05 Encounter: [id number]    Brief History of Admission -84-year-old male with a history of combined congestive heart failure with chronic kidney disease stage IV who presents for decreased oral intake nausea vomiting was found to be hypothermic and hypotensive meeting sepsis criteria.  Nephrology called for acute kidney injury.    Assessment & Plan  Septic shock ([facility])  -- Meeting sepsis criteria  --Lactic acid normal with initial procalcitonin level 0.3.  --Urine analysis shows innumerable bacteria and WBCs.  Patient has a history of multiple urinary tract infections in the past.  --Chest x-ray shows vascular pulmonary congestion  --CT scan shows diffuse mild colonic wall thickening possible colitis.  --Viral panel including COVID-19 and influenza were negative  --C. difficile positive but no active infection  --Blood culture growing out corynebacterium suspected contaminant.  --Urine culture growing out Citrobacter  --Currently on norepinephrine  --Antibiotics narrowed to IV ceftriaxone  --Currently on hydrocortisone 50 mg every 6 hours  --IV fluids limited due to underlying pulmonary congestion and underlying congestive heart failure  Diabetes mellitus with peripheral artery disease ([facility])  Lab Results   Component Value Date    HGBA1C 7.7 (H) 12/07/2024   -- Hemoglobin A1c 7.7  -- Insulin management as per critical care    Recent Labs     02/28/25 2033 03/01/25  0004 03/01/25  0729 03/01/25  1101   POCGLU 200* 243* 239* 228*       Blood Sugar Average: Last 72 hrs:  (P) 139.5625    Pericardial effusion  -- Management as per critical care  Chronic atrial fibrillation (HCC)  -- Currently rate controlled  Acute kidney injury superimposed on chronic kidney disease  ([facility])  Lab Results   Component Value Date    EGFR 12 03/01/2025    EGFR 12 02/28/2025    EGFR 13 02/28/2025    CREATININE 4.05 (H) 03/01/2025    CREATININE 3.93  (H) 02/28/2025    CREATININE 3.90 (H) 02/28/2025     Acute kidney injury  --Present on admission aggression creatinine 3.70 mg/dL  --CT scan showed no evidence of hydronephrosis  --Dupont catheter in place  --In the setting of septic shock and hypotension resulting in acute tumor necrosis  --Renal function continues to worsen up to 4 mg/dL  --Currently on norepinephrine  --Borderline oliguric  --Overall net +5.5 L  --Underlying chronic kidney disease stage IV  --Maintain pressor support to maintain the blood pressure and renal perfusion  --No objections to diuresis if needed from a volume standpoint  --Goals of care to be ongoing  --Patient would be a poor dialysis candidate due to his overall poor declining health and poor cardiac status.  I do not believe he will get any meaningful benefit from starting CRRT and there could be more risks associated.    Chronic kidney disease stage IV  --Patient underwent CKD education class.  Was undecided on dialysis modality.  --Outpatient nephrologist Dr. 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  Thrombocytopenia (HCC)  -- Acute on chronic  --Low and stable  --Underlying sepsis  Diarrhea  -- CT scan showed possible colitis  --History of recent C. difficile  Hypothermia  -- Underlying sepsis  Metabolic encephalopathy  -- In the setting of septic shock  Pleural effusion  -- Bilateral pleural effusion seen on CT scan  Chronic combined systolic and diastolic CHF (congestive heart failure) (HCC)  Wt Readings from Last 3 Encounters:   03/01/25 70.6 kg (155 lb 10.3 oz)   01/14/25 69.9 kg (154 lb 1.6 oz)   01/06/25 70.3 kg (155 lb)   -- Chest x-ray shows moderate pulmonary edema  -- Underlying septic shock  -- Concurrent acute kidney injury  -- Diuretics on hold but no objections to as needed  diuretics if needed for volume management can consider a Bumex drip if needed  -- Ejection fraction 40%  UTI (urinary tract infection)  -- Urine culture growing out Citrobacter  --IV ceftriaxone  High anion gap metabolic acidosis  -- In the setting of septic shock and acute kidney injury    I have reviewed the nephrology recommendations including assessment and plan, with critical care team, and we are in agreement with renal plan including the information outlined above.    Greater than 45 minutes of critical care nephrology time was spent on this case.    HISTORY OF PRESENT ILLNESS:  Requesting Physician: Pablito Cesar MD  Reason for Consult: Acute kidney injury    Yao Tipton is a 84 y.o. male who was admitted to [facility] on February 25 after presenting with nausea and vomiting was found to be hypothermic and hypotensive. A renal consultation is requested today for assistance in the management of acute kidney injury.  Patient follows with me as an outpatient last seen back in December he had multiple admissions in the past year for acute exacerbation of congestive heart failure and volume overload.  His baseline creatinine fluctuates 2.5 to 3 mg/dL.  He presented with a creatinine of 3.7 which is now worsened to 4 mg/dL.  He is currently hypotensive being treated for septic shock and is on pressors.    PAST MEDICAL HISTORY:  Past Medical History:   Diagnosis Date    Atrial fibrillation (HCC)     BPH (benign prostatic hyperplasia)     CHF (congestive heart failure) (HCC)     Chronic kidney disease     Coronary artery disease     Diabetes mellitus (HCC)     Hyperlipidemia     Hypertension     Hyponatremia 12/07/2024       PAST SURGICAL HISTORY:  Past Surgical History:   Procedure Laterality Date    CARDIAC CATHETERIZATION N/A 6/30/2023    Procedure: Cardiac pericardiocentesis;  Surgeon: Shanna Adame DO;  Location: [facility];  Service: Cardiology    CARDIAC CATHETERIZATION  N/A 6/30/2023    Procedure: Cardiac RHC;  Surgeon: Shanna Adame DO;  Location: [facility];  Service: Cardiology    EYE SURGERY      IR CHEST TUBE PLACEMENT  6/24/2023    IR CHEST TUBE PLACEMENT  7/28/2023    IR PLEURAL EFFUSION LONG-TERM CATHETER PLACEMENT  8/7/2023    IR PLEURAL EFFUSION LONG-TERM CATHETER REMOVAL  1/3/2024    IR THORACENTESIS  12/11/2023    SKIN CANCER EXCISION      TONSILLECTOMY         ALLERGIES:  Allergies   Allergen Reactions    Elemental Sulfur     Sulfa Antibiotics        SOCIAL HISTORY:  Social History     Substance and Sexual Activity   Alcohol Use Not Currently    Alcohol/week: 0.0 standard drinks of alcohol    Comment: special occasions     Social History     Substance and Sexual Activity   Drug Use Not Currently     Social History     Tobacco Use   Smoking Status Never   Smokeless Tobacco Former   Tobacco Comments    Smoked a pipe 50 years ago       FAMILY HISTORY:  Family History   Problem Relation Age of Onset    Heart disease Mother     Diabetes Father     Vision loss Father     Cancer Sister     Diabetes Sister        MEDICATIONS:    Current Facility-Administered Medications:     atorvastatin (LIPITOR) tablet 40 mg, 40 mg, Oral, Daily With Dinner, SUSANA Rodriguez, 40 mg at 02/28/25 1642    cefTRIAXone (ROCEPHIN) IVPB (premix in dextrose) 1,000 mg 50 mL, 1,000 mg, Intravenous, Q24H, Tara Liao V, CRNP    chlorhexidine (PERIDEX) 0.12 % oral rinse 15 mL, 15 mL, Mouth/Throat, Q12H [facility], SUSANA Rodriguez, 15 mL at 03/01/25 0820    docusate sodium (COLACE) capsule 100 mg, 100 mg, Oral, BID, Tara Liao V, CRNP, 100 mg at 02/28/25 1718    hydrocortisone (Solu-CORTEF) injection 50 mg, 50 mg, Intravenous, Q6H [facility], 50 mg at 03/01/25 0535    insulin lispro (HumALOG/ADMELOG) 100 units/mL subcutaneous injection 2-12 Units, 2-12 Units, Subcutaneous, TID AC **AND** Fingerstick Glucose (POCT), , , TID AC,  SUSANA York    insulin lispro (HumALOG/ADMELOG) 100 units/mL subcutaneous injection 2-12 Units, 2-12 Units, Subcutaneous, HS, SUSANA York    latanoprost (XALATAN) 0.005 % ophthalmic solution 1 drop, 1 drop, Both Eyes, HS, SUSANA Rodriguez, 1 drop at 02/28/25 2101    midodrine (PROAMATINE) tablet 5 mg, 5 mg, Oral, TID AC, SUSANA Arshad, 5 mg at 03/01/25 0623    norepinephrine (LEVOPHED) 4 mg (STANDARD CONCENTRATION) IV in sodium chloride 0.9% 250 mL, 1-30 mcg/min, Intravenous, Titrated, SUSANA York, Last Rate: 7.5 mL/hr at 03/01/25 0311, 2 mcg/min at 03/01/25 0311    pantoprazole (PROTONIX) injection 40 mg, 40 mg, Intravenous, Q24H RASHAD, SUSANA Arshad, 40 mg at 03/01/25 0535    tamsulosin (FLOMAX) capsule 0.4 mg, 0.4 mg, Oral, Daily With Dinner, Pablito Cesar MD, 0.4 mg at 02/28/25 1642    timolol (TIMOPTIC) 0.5 % ophthalmic solution 1 drop, 1 drop, Both Eyes, Daily, SUSANA Rodriguez, 1 drop at 03/01/25 0817    REVIEW OF SYSTEMS:  Unable to obtain review systems due to acute metabolic encephalopathy    PHYSICAL EXAM:  Current Weight: Weight - Scale: 70.6 kg (155 lb 10.3 oz)  First Weight: Weight - Scale: 65 kg (143 lb 4.8 oz)  Vitals:    03/01/25 0830 03/01/25 0900 03/01/25 0930 03/01/25 1030   BP: 99/50 (!) 88/53 (!) 92/47 91/53   BP Location:       Pulse: 64 66 65 62   Resp: 22 19 16 19   Temp:       TempSrc:       SpO2: 98% 99% 98% 99%   Weight:       Height:           Intake/Output Summary (Last 24 hours) at 3/1/2025 1107  Last data filed at 3/1/2025 0626  Gross per 24 hour   Intake 1275.02 ml   Output 345 ml   Net 930.02 ml     Physical Exam  Vitals and nursing note reviewed.   Constitutional:       General: He is not in acute distress.     Appearance: He is well-developed. He is ill-appearing. He is not diaphoretic.   HENT:      Head: Normocephalic and atraumatic.   Eyes:      General: No scleral  icterus.     Pupils: Pupils are equal, round, and reactive to light.   Cardiovascular:      Rate and Rhythm: Normal rate and regular rhythm.      Heart sounds: Normal heart sounds. No murmur heard.     No friction rub. No gallop.   Pulmonary:      Effort: Pulmonary effort is normal. No respiratory distress.      Breath sounds: Rales present. No wheezing.   Chest:      Chest wall: No tenderness.   Abdominal:      General: Bowel sounds are normal. There is no distension.      Palpations: Abdomen is soft.      Tenderness: There is no abdominal tenderness. There is no rebound.   Musculoskeletal:         General: Normal range of motion.      Cervical back: Normal range of motion and neck supple.   Skin:     General: Skin is dry.      Coloration: Skin is pale.      Findings: No rash.   Neurological:      Mental Status: He is alert. He is disoriented.           Invasive Devices:   Urethral Catheter 16 Fr. (Active)   Reasons to continue Urinary Catheter  Acute urinary retention/obstruction failing urinary retention protocol 03/01/25 0800   Goal for Removal Voiding trial when ambulation improves 03/01/25 0800   Site Assessment Clean;Skin intact 03/01/25 0800   Dupont Care Done 03/01/25 0857   Collection Container Standard drainage bag 03/01/25 0800   Securement Method Securing device (Describe) 03/01/25 0800   Output (mL) 25 mL 03/01/25 0539     Lab Results:   Results from last 7 days   Lab Units 03/01/25  0531 02/28/25  1956 02/28/25  1358 02/28/25  0605 02/27/25  0514 02/26/25  0519 02/25/25  1430 02/25/25  1430   WBC Thousand/uL 12.64*  --   --  6.06 4.95 5.02  --  5.62   HEMOGLOBIN g/dL 8.7*  --   --  9.2* 8.4* 9.0*  --  9.7*   HEMATOCRIT % 27.2*  --   --  29.5* 26.1* 27.3*  --  28.9*   PLATELETS Thousands/uL 54*  --   --  35* 42* 38*  --  35*   POTASSIUM mmol/L 4.4 4.1 4.3 4.2 3.9 4.3  --  4.7   CHLORIDE mmol/L 102 103 103 106 104 103  --  99   CO2 mmol/L 18* 19* 20* 18* 20* 20*  --  22   BUN mg/dL 106* 102* 103* 101*  "104* 103*  --  101*   CREATININE mg/dL 4.05* 3.93* 3.90* 3.69* 3.76* 3.60*  --  3.78*   CALCIUM mg/dL 8.2* 8.3* 8.4 7.8* 7.5* 7.2*  --  7.6*   MAGNESIUM mg/dL 2.4  --   --  2.4 2.4 1.9   < >  --    PHOSPHORUS mg/dL 5.8*  --   --  5.4*  --  4.6*  --   --    ALK PHOS U/L  --   --   --   --  104  --   --  130*   ALT U/L  --   --   --   --  18  --   --  23   AST U/L  --   --   --   --  16  --   --  26    < > = values in this interval not displayed.         Portions of the record may have been created with voice recognition software. Occasional wrong word or \"sound a like\" substitutions may have occurred due to the inherent limitations of voice recognition software. Read the chart carefully and recognize, using context, where substitutions have occurred.If you have any questions, please contact the dictating provider.    "

## 2025-03-01 NOTE — PLAN OF CARE
Problem: Potential for Falls  Goal: Patient will remain free of falls  Description: INTERVENTIONS:  - Educate patient/family on patient safety including physical limitations  - Instruct patient to call for assistance with activity   - Consult OT/PT to assist with strengthening/mobility   - Keep Call bell within reach  - Keep bed low and locked with side rails adjusted as appropriate  - Keep care items and personal belongings within reach  - Initiate and maintain comfort rounds  - Make Fall Risk Sign visible to staff  - Offer Toileting every 2 Hours, in advance of need  - Initiate/Maintain bed/chair alarm  - Obtain necessary fall risk management equipment  - Apply yellow socks and bracelet for high fall risk patients  - Consider moving patient to room near nurses station  Outcome: Progressing     Problem: PAIN - ADULT  Goal: Verbalizes/displays adequate comfort level or baseline comfort level  Description: Interventions:  - Encourage patient to monitor pain and request assistance  - Assess pain using appropriate pain scale  - Administer analgesics based on type and severity of pain and evaluate response  - Implement non-pharmacological measures as appropriate and evaluate response  - Consider cultural and social influences on pain and pain management  - Notify physician/advanced practitioner if interventions unsuccessful or patient reports new pain  Outcome: Progressing     Problem: INFECTION - ADULT  Goal: Absence or prevention of progression during hospitalization  Description: INTERVENTIONS:  - Assess and monitor for signs and symptoms of infection  - Monitor lab/diagnostic results  - Monitor all insertion sites, i.e. indwelling lines, tubes, and drains  - Monitor endotracheal if appropriate and nasal secretions for changes in amount and color  - [place] appropriate cooling/warming therapies per order  - Administer medications as ordered  - Instruct and encourage patient and family to use good hand hygiene  technique  - Identify and instruct in appropriate isolation precautions for identified infection/condition  Outcome: Progressing     Problem: DISCHARGE PLANNING  Goal: Discharge to home or other facility with appropriate resources  Description: INTERVENTIONS:  - Identify barriers to discharge w/patient and caregiver  - Arrange for needed discharge resources and transportation as appropriate  - Identify discharge learning needs (meds, wound care, etc.)  - Arrange for interpretive services to assist at discharge as needed  - Refer to Case Management Department for coordinating discharge planning if the patient needs post-hospital services based on physician/advanced practitioner order or complex needs related to functional status, cognitive ability, or social support system  Outcome: Progressing     Problem: Knowledge Deficit  Goal: Patient/family/caregiver demonstrates understanding of disease process, treatment plan, medications, and discharge instructions  Description: Complete learning assessment and assess knowledge base.  Interventions:  - Provide teaching at level of understanding  - Provide teaching via preferred learning methods  Outcome: Progressing     Problem: NEUROSENSORY - ADULT  Goal: Achieves stable or improved neurological status  Description: INTERVENTIONS  - Monitor and report changes in neurological status  - Monitor vital signs such as temperature, blood pressure, glucose, and any other labs ordered   - Initiate measures to prevent increased intracranial pressure  - Monitor for seizure activity and implement precautions if appropriate      Outcome: Progressing

## 2025-03-01 NOTE — ASSESSMENT & PLAN NOTE
Lab Results   Component Value Date    EGFR 12 02/28/2025    EGFR 13 02/28/2025    EGFR 13 02/28/2025    CREATININE 3.93 (H) 02/28/2025    CREATININE 3.90 (H) 02/28/2025    CREATININE 3.69 (H) 02/28/2025       Creatinine 3.78 on admission  Baseline creatinine 2.2-2.5?  S/p IV resuscitation in the ED  CT A/P without hydronephrosis or evidence of obstruction   Creatinine initially improved but then vaishnavi.   Dupont placed 2/27 after failing urinary retention protocol   S/p lasix 20mg yesterday with no response.  Urine output down-trending, serum bicarb 18-20  Continue IVF's  Close I&O  Continue flomax  Avoid nephrotoxins

## 2025-03-01 NOTE — ASSESSMENT & PLAN NOTE
-- Meeting sepsis criteria  --Lactic acid normal with initial procalcitonin level 0.3.  --Urine analysis shows innumerable bacteria and WBCs.  Patient has a history of multiple urinary tract infections in the past.  --Chest x-ray shows vascular pulmonary congestion  --CT scan shows diffuse mild colonic wall thickening possible colitis.  --Viral panel including COVID-19 and influenza were negative  --C. difficile positive but no active infection  --Blood culture growing out corynebacterium suspected contaminant.  --Urine culture growing out Citrobacter  --Currently on norepinephrine  --Antibiotics narrowed to IV ceftriaxone  --Currently on hydrocortisone 50 mg every 6 hours  --IV fluids limited due to underlying pulmonary congestion and underlying congestive heart failure

## 2025-03-01 NOTE — PROGRESS NOTES
Progress Note - Critical Care/ICU   Name: Yao Tipton 84 y.o. male I MRN: [id number]  Unit/Bed#: ICU 05 I Date of Admission: 2/25/2025   Date of Service: 3/1/2025 I Hospital Day: 4      Assessment & Plan  Septic shock (HCC)  Patient presents with hypotension, hypothermia. Likely secondary to septic shock in the setting of UTI. Hx of multiple UTI's. Also has a history of exudative pleural effusion in 2023. However, cannot rule out obstructive shock (hx of pericardial effusion s/p drainage in 2023) & hypovolemia at this time   LA 0.7, initial procal 0.34  UA with innumerable bacteria, WBC, negative nitrites, + large leuk. Hx of UTIs w/ proteus. Most recently in January of this year secondary to urinary retention.  CXR: vascular congestion with small bilateral pleural effusions. No obvious consolidations  CT A/P: Diffuse mild colonic wall thickening which may be due to underdistention or colitis. Evaluation is limited due to lack of oral and intravenous contrast.Urinary bladder is underdistended however appears somewhat thick walled. Correlation with urinalysis is recommended.Partially imaged bilateral pleural effusions. Chronic right pleural thickening which may be on the basis of infection or other etiologies. Bibasilar opacities, likely atelectasis.Cardiomegaly with moderate pericardial effusion.   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 Cultures grew proteus on multiple occasions. No hx of MDRO  Previously had victor in place, d/c'ed 1/8/25  CT A/P bladder wall thicking. No hydro or obstruction   Continue Cefepime D5  Urine culture growing citrobacter. Sensitive to Ceftriaxone- can likely de-escalate today   BC 1/2 growing Corynebacterium- likely contaminant  Failed urinary retention protocol, victor placed 2/27  Continue flomax   Pericardial effusion  History of pericardial effusion in June of 2023. Originally presented with concern for sepsis. Found to have a pericardial effusion and was transferred to [facility] and underwent pericardiocentesis on 6/30/23 by Dr. Adame. Unclear etiology per previous notes. Cultures negative however WBC 1,280 in pericardial fluid. Negative for malignancy  2/25: CT A/P showing moderate pericardial effusion   2/26 Echo: EF 60%, There is a moderate pericardial effusion. Glides mainly to apical area as well as right ventricular and right atrium with no evidence of tamponade. Pericardium is very thickened. Certainly we cannot rule out chronic constriction.   Eliquis currently on hold   Given rising creatinine, consider limited echo to assess for increased pericardial effusion /tamponade   Goal MAP >65    Acute kidney injury superimposed on chronic kidney disease  (HCC)  Lab Results   Component Value Date    EGFR 12 02/28/2025    EGFR 13 02/28/2025    EGFR 13 02/28/2025    CREATININE 3.93 (H) 02/28/2025    CREATININE 3.90 (H) 02/28/2025    CREATININE 3.69 (H) 02/28/2025       Creatinine 3.78 on admission  Baseline creatinine 2.2-2.5?  S/p IV resuscitation in the ED  CT A/P without hydronephrosis or evidence of obstruction   Creatinine initially improved but then vaishnavi.   Victor placed 2/27 after failing urinary retention protocol   S/p lasix  "20mg yesterday with no response.  Urine output down-trending, serum bicarb 18-20  Continue IVF's  Close I&O  Continue flomax  Avoid nephrotoxins   Hypothermia  Hypothermia, likely secondary to sepsis  given 50mg solucortef in the ED, cortisol level unfortunately obtained after hydrocortisone administration   TSH 9, free T4 0.83   2/27 hydrocortisone 50mg Q6 initiated due to refractory hypothermia, hypotension and hypoglycemia   Ruben martín PRN to achieve normothermia  Thrombocytopenia (HCC)  Platelets 35K on arrival  Baseline platelets   Thrombocytopenia possibly in the setting of sepsis  Hold eliquis and DVT ppx for now  Consider resuming when platelets >50K  Monitor for signs/symptoms of bleeding  Continue to trend   Chronic combined systolic and diastolic CHF (congestive heart failure) (HCC)  Wt Readings from Last 3 Encounters:   02/28/25 68.6 kg (151 lb 3.8 oz)   01/14/25 69.9 kg (154 lb 1.6 oz)   01/06/25 70.3 kg (155 lb)       Most recent Echo 12/9/24: EF 45%, abnormal diastolic dysfunction, mildly reduced RV function.   Home meds: torsemide 40mg BID  Lower extremity edema present but mucus membranes appear dry, weight down 4KG from one month ago    S/p fluid resuscitation in the ED  CXR consistent with vascular congestion and small pleural effusions  Holding scheduled diuretics. S/p furosemide 20mg yesterday with poor response. Now with rise in creatinine. Hold additional diuresis for now  Close I&Os  Daily weights      Chronic atrial fibrillation (HCC)  Chronic afib on eliquis  Not on rate control at this time  Currently HR in the 80-90s  Hold eliquis secondary to thrombocytopenia   Diarrhea  Episodes of diarrhea in the ED  C diff positive, EIA negative  Enteric stool panel negative   CT \"Diffuse mild colonic wall thickening which may be due to underdistention or colitis. Evaluation is limited due to lack of oral and intravenous contrast.\"  Monitor output   Diabetes mellitus with peripheral " "artery disease (HCC)  Lab Results   Component Value Date    HGBA1C 7.7 (H) 12/07/2024       Recent Labs     02/28/25  1226 02/28/25  1656 02/28/25 2033 03/01/25  0004   POCGLU 152* 162* 200* 243*       Blood Sugar Average: Last 72 hrs:  (P) [id number]    Hold home amaryl  Continue SSI Q6   Goal BG <180  Pleural effusion  Small bilateral pleural effusions present on CXR  CT with evidence of \"Partially imaged bilateral pleural effusions. Chronic right pleural thickening which may be on the basis of infection or other etiologies. Bibasilar opacities, likely atelectasis. '  Hx of exudative effusion s/p chest tube placement and tunneled pleural drain placed in 2023, s/p removal   Cultures negative, cytology negative  Thought to be hydropneumothorax ?  Respiratory status currently stable on RA  Metabolic encephalopathy  Patient alert and oriented on arrival to the ED, goals of care discussion held with patient and ED physician  Likely metabolic encephalopathy in the setting of UTI  VBG without hypercarbia  Ammonia 29  TSH 9  Mental status improving, oriented to \"hospital\", person but not time   Hold sedating meds   CAM ICU  Sleep hygiene   Disposition: Critical care    ICU Core Measures     A: Assess, Prevent, and Manage Pain Has pain been assessed? Yes  Need for changes to pain regimen? No   B: Both SAT/SAT  N/A   C: Choice of Sedation RASS Goal: N/A patient not on sedation  Need for changes to sedation or analgesia regimen? NA   D: Delirium CAM-ICU: Negative   E: Early Mobility  Plan for early mobility? Yes   F: Family Engagement Plan for family engagement today? Yes       Antibiotic Review: Can likely de-escalate abx today     Review of Invasive Devices:    Dupont Plan: Continue for accurate I/O monitoring for 48 hours        Prophylaxis:  VTE VTE covered by:    None       Stress Ulcer  covered byfamotidine (PEPCID) 10 mg tablet [[id number]] (Long-Term Med), pantoprazole (PROTONIX) injection 40 mg [[id number]] "         24 Hour Events : Remains on low dose levophed. Now on stress dose steroids and midodrine. UOP has decreased in past 24h despite diuresis with 40mg furosemide yesterday. Now with rising creatinine. No acute events overnight.     Subjective   Review of Systems: See HPI for Review of Systems    Objective :                   Vitals I/O      Most Recent Min/Max in 24hrs   Temp (!) 97 °F (36.1 °C) Temp  Min: 96 °F (35.6 °C)  Max: 97.3 °F (36.3 °C)   Pulse 70 Pulse  Min: 58  Max: 75   Resp 21 Resp  Min: 13  Max: 34   /53 BP  Min: 75/52  Max: 146/58   O2 Sat 97 % SpO2  Min: 96 %  Max: 100 %      Intake/Output Summary (Last 24 hours) at 3/1/2025 0345  Last data filed at 3/1/2025 0204  Gross per 24 hour   Intake 1053.81 ml   Output 365 ml   Net 688.81 ml       Diet Cardiovascular; Sodium 2 GM; Dysphagia 1-Pureed; Thin Liquid    Invasive Monitoring   N/A        Physical Exam   Physical Exam  Vitals and nursing note reviewed.   Skin:     General: Skin is warm and dry.   HENT:      Head: Normocephalic and atraumatic.      Mouth/Throat:      Mouth: Mucous membranes are moist.   Neck:        Comments: Bruising at sternal notch  Cardiovascular:      Rate and Rhythm: Normal rate and regular rhythm.      Pulses:           Radial pulses are 2+ on the right side and 2+ on the left side.      Heart sounds: Normal heart sounds.   Musculoskeletal:      Right lower le+ Edema present.      Left lower le+ Edema present.   Abdominal: General: Bowel sounds are normal.      Palpations: Abdomen is soft.   Constitutional:       General: He is not in acute distress.     Appearance: He is ill-appearing.   Pulmonary:      Effort: Pulmonary effort is normal. No accessory muscle usage, respiratory distress or accessory muscle usage.      Breath sounds: Decreased breath sounds present.   Psychiatric:         Mood and Affect: Affect is flat.         Behavior: Behavior is slowed. Behavior is cooperative.   Neurological:       General: No focal deficit present.      Mental Status: He is easily aroused. He is lethargic, disoriented to time and disoriented to situation.      GCS: GCS eye subscore is 3. GCS verbal subscore is 4. GCS motor subscore is 6.      Motor: Weakness (generalized).   Genitourinary/Anorectal:  Dupont present.        Diagnostic Studies        Lab Results: I have reviewed the following results: see below. AM labs pending  EKG: NSR on monitor, rate 60-70s  Imaging: No new imaging in preceding 24h     Medications:  Scheduled PRN   atorvastatin, 40 mg, Daily With Dinner  cefepime, 1,000 mg, Q24H  chlorhexidine, 15 mL, Q12H RASHAD  docusate sodium, 100 mg, BID  hydrocortisone sodium succinate, 50 mg, Q6H RASHAD  insulin lispro, 1-5 Units, Q6H RASHAD  latanoprost, 1 drop, HS  midodrine, 5 mg, TID AC  pantoprazole, 40 mg, Q24H RASHAD  tamsulosin, 0.4 mg, Daily With Dinner  timolol, 1 drop, Daily          Continuous    norepinephrine, 1-30 mcg/min, Last Rate: 2 mcg/min (03/01/25 0311)         Labs:   CBC    Recent Labs     02/27/25  0514 02/28/25  0605   WBC 4.95 6.06   HGB 8.4* 9.2*   HCT 26.1* 29.5*   PLT 42* 35*     BMP    Recent Labs     02/28/25  1358 02/28/25 1956   SODIUM 137 136   K 4.3 4.1    103   CO2 20* 19*   AGAP 14* 14*   * 102*   CREATININE 3.90* 3.93*   CALCIUM 8.4 8.3*       Coags    Recent Labs     02/28/25  0605   INR 1.47*        Additional Electrolytes  Recent Labs     02/27/25  0514 02/28/25  0605   MG 2.4 2.4   PHOS  --  5.4*   CAIONIZED 1.01* 1.07*          Blood Gas    No recent results  Recent Labs     02/28/25 1956   PHVEN 7.283*   PLU5RMC 45.2   PO2VEN 39.4   ICE0JTG 20.9*   BEVEN -5.5   N9ATNSL 68.9    LFTs  Recent Labs     02/27/25  0514   ALT 18   AST 16   ALKPHOS 104   ALB 2.3*   TBILI 0.49       Infectious  No recent results  Glucose  Recent Labs     02/27/25  0514 02/28/25  0605 02/28/25  1358 02/28/25 1956   GLUC 68 152* 163* 213*

## 2025-03-01 NOTE — ASSESSMENT & PLAN NOTE
Wt Readings from Last 3 Encounters:   03/01/25 70.6 kg (155 lb 10.3 oz)   01/14/25 69.9 kg (154 lb 1.6 oz)   01/06/25 70.3 kg (155 lb)   -- Chest x-ray shows moderate pulmonary edema  -- Underlying septic shock  -- Concurrent acute kidney injury  -- Diuretics on hold but no objections to as needed diuretics if needed for volume management can consider a Bumex drip if needed  -- Ejection fraction 40%

## 2025-03-01 NOTE — ASSESSMENT & PLAN NOTE
Wt Readings from Last 3 Encounters:   02/28/25 68.6 kg (151 lb 3.8 oz)   01/14/25 69.9 kg (154 lb 1.6 oz)   01/06/25 70.3 kg (155 lb)       Most recent Echo 12/9/24: EF 45%, abnormal diastolic dysfunction, mildly reduced RV function.   Home meds: torsemide 40mg BID  Lower extremity edema present but mucus membranes appear dry, weight down 4KG from one month ago    S/p fluid resuscitation in the ED  CXR consistent with vascular congestion and small pleural effusions  Holding scheduled diuretics. S/p furosemide 20mg yesterday with poor response. Now with rise in creatinine. Hold additional diuresis for now  Close I&Os  Daily weights

## 2025-03-01 NOTE — ASSESSMENT & PLAN NOTE
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  I do not believe he will get any meaningful benefit from starting CRRT and there could be more risks associated.    Chronic kidney disease stage IV  --Patient underwent CKD education class.  Was undecided on dialysis modality.  --Outpatient nephrologist Dr. 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

## 2025-03-01 NOTE — ASSESSMENT & PLAN NOTE
History of pericardial effusion in June of 2023. Originally presented with concern for sepsis. Found to have a pericardial effusion and was transferred to [facility] and underwent pericardiocentesis on 6/30/23 by Dr. Adame. Unclear etiology per previous notes. Cultures negative however WBC 1,280 in pericardial fluid. Negative for malignancy  2/25: CT A/P showing moderate pericardial effusion   2/26 Echo: EF 60%, There is a moderate pericardial effusion. Glides mainly to apical area as well as right ventricular and right atrium with no evidence of tamponade. Pericardium is very thickened. Certainly we cannot rule out chronic constriction.   Eliquis currently on hold   Given rising creatinine, consider limited echo to assess for increased pericardial effusion /tamponade   Goal MAP >65

## 2025-03-02 NOTE — ASSESSMENT & PLAN NOTE
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  I do not believe he will get any meaningful benefit from starting CRRT and there could be more risks associated.  -- No urgent indication for renal placement therapy  -- Continue check daily BMP  -- No objections to as needed IV diuretics as needed  -- Would recommend conservative measures.  I do not think dialysis would add more benefits over risks and actually would create more risks especially given his underlying poor cardiac status and overall frail health.    Chronic kidney disease stage IV  --Patient underwent CKD education class.  Was undecided on dialysis modality.  --Outpatient nephrologist Dr. 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

## 2025-03-02 NOTE — PROGRESS NOTES
Progress Note - Nephrology   Name: Yao Tipton 84 y.o. male I MRN: [id number]  Unit/Bed#: ICU 05 I Date of Admission: 2/25/2025   Date of Service: 3/2/2025 I Hospital Day: 5     Assessment & Plan  Acute kidney injury superimposed on chronic kidney disease  (HCC)  Lab Results   Component Value Date    EGFR 11 03/02/2025    EGFR 11 03/01/2025    EGFR 11 03/01/2025    CREATININE 4.45 (H) 03/02/2025    CREATININE 4.32 (H) 03/01/2025    CREATININE 4.32 (H) 03/01/2025     Acute kidney injury  --Present on admission aggression creatinine 3.70 mg/dL  --CT scan showed no evidence of hydronephrosis  --Dupont catheter in place  --In the setting of septic shock and hypotension resulting in acute tumor necrosis  --Creatinine currently at 4.45 mg/dL but the rate of rise is starting to slow  --Off norepinephrine  --Oliguric  --Overall net +5.6 L  --Underlying chronic kidney disease stage IV  --Off pressors and off IV fluids  --Goals of care to be ongoing  --Patient would be a poor dialysis candidate due to his overall poor declining health and poor cardiac status.  I do not believe he will get any meaningful benefit from starting CRRT and there could be more risks associated.  -- No urgent indication for renal placement therapy  -- Continue check daily BMP  -- No objections to as needed IV diuretics as needed  -- Would recommend conservative measures.  I do not think dialysis would add more benefits over risks and actually would create more risks especially given his underlying poor cardiac status and overall frail health.    Chronic kidney disease stage IV  --Patient underwent CKD education class.  Was undecided on dialysis modality.  --Outpatient nephrologist Dr. 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  Septic shock (HCC)  -- Meeting  sepsis criteria  --Lactic acid normal with initial procalcitonin level 0.3.  --Urine analysis shows innumerable bacteria and WBCs.  Patient has a history of multiple urinary tract infections in the past.  --Chest x-ray shows vascular pulmonary congestion  --CT scan shows diffuse mild colonic wall thickening possible colitis.  --Viral panel including COVID-19 and influenza were negative  --C. difficile positive but no active infection  --Blood culture growing out corynebacterium suspected contaminant.  --Urine culture growing out Citrobacter  --Currently off norepinephrine drip  --Antibiotics narrowed to IV ceftriaxone  --Currently on hydrocortisone 50 mg every 6 hours  -- Off IV fluids  Diabetes mellitus with peripheral artery disease ([facility])  Lab Results   Component Value Date    HGBA1C 7.7 (H) 12/07/2024   -- Hemoglobin A1c 7.7  -- Insulin management as per critical care    Recent Labs     03/01/25  1624 03/01/25  2130 03/02/25  0736 03/02/25  1143   POCGLU 216* 163* 172* 175*       Blood Sugar Average: Last 72 hrs:  (P) 173.4    Pericardial effusion  -- Management as per critical care  --2D echocardiogram reviewed.  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 difficile  Hypothermia  -- Underlying sepsis  Metabolic encephalopathy  -- In the setting of septic shock  Pleural effusion  -- Bilateral pleural effusion seen on CT scan  Chronic combined systolic and diastolic CHF (congestive heart failure) ([facility])  Wt Readings from Last 3 Encounters:   03/02/25 69.8 kg (153 lb 14.1 oz)   01/14/25 69.9 kg (154 lb 1.6 oz)   01/06/25 70.3 kg (155 lb)   -- Chest x-ray shows moderate pulmonary edema  -- Underlying septic shock  -- Concurrent acute kidney injury  --No objections to  as needed IV diuretics  -- Ejection fraction 40%  UTI (urinary tract infection)  -- Urine culture growing out Citrobacter  --IV ceftriaxone  High anion gap metabolic acidosis  -- Anion gap closed.  Bicarbonate level slowly improving    I have reviewed the nephrology recommendations including assessment and plan, with critical care, and we are in agreement with renal plan including the information outlined above. Nephrology service will follow.    Subjective   Brief History of Admission - 84-year-old male with a history of combined congestive heart failure with chronic kidney disease stage IV who presents for decreased oral intake nausea vomiting was found to be hypothermic and hypotensive meeting sepsis criteria. Nephrology called for acute kidney injury.     No overnight events.  Off pressors.    Objective :  Temp:  [94.4 °F (34.7 °C)-99.5 °F (37.5 °C)] 96.8 °F (36 °C)  HR:  [66-88] 67  BP: ()/() 95/50  Resp:  [15-34] 16  SpO2:  [95 %-99 %] 98 %  O2 Device: None (Room air)    Current Weight: Weight - Scale: 69.8 kg (153 lb 14.1 oz)  First Weight: Weight - Scale: 65 kg (143 lb 4.8 oz)  I/O         02/28 0701  03/01 0700 03/01 0701  03/02 0700 03/02 0701  03/03 0700    P.O. 500 150     I.V. (mL/kg) 375 (5.3) 261.1 (3.7)     IV Piggyback 500      Total Intake(mL/kg) 1375 (19.5) 411.1 (5.9)     Urine (mL/kg/hr) 445 (0.3) 300 (0.2)     Stool 0      Total Output 445 300     Net +930 +111.1            Unmeasured Stool Occurrence 2 x            Physical Exam  Vitals and nursing note reviewed.   Constitutional:       General: He is not in acute distress.     Appearance: He is well-developed. He is ill-appearing. He is not diaphoretic.   HENT:      Head: Normocephalic and atraumatic.   Eyes:      General: No scleral icterus.     Pupils: Pupils are equal, round, and reactive to light.   Cardiovascular:      Rate and Rhythm: Normal rate and regular rhythm.      Heart sounds: Normal heart sounds. No murmur heard.      No friction rub. No gallop.   Pulmonary:      Effort: Pulmonary effort is normal. No respiratory distress.      Breath sounds: Rales present. No wheezing.   Chest:      Chest wall: No tenderness.   Abdominal:      General: Bowel sounds are normal. There is no distension.      Palpations: Abdomen is soft.      Tenderness: There is no abdominal tenderness. There is no rebound.   Musculoskeletal:         General: Normal range of motion.      Cervical back: Normal range of motion and neck supple.      Right lower leg: Edema present.      Left lower leg: Edema present.   Skin:     General: Skin is dry.      Coloration: Skin is pale.      Findings: No rash.   Neurological:      Mental Status: He is alert. He is disoriented.         Medications:    Current Facility-Administered Medications:     atorvastatin (LIPITOR) tablet 40 mg, 40 mg, Oral, Daily With Dinner, SUSANA Rodriguez, 40 mg at 03/01/25 1620    cefTRIAXone (ROCEPHIN) IVPB (premix in dextrose) 1,000 mg 50 mL, 1,000 mg, Intravenous, Q24H, SUSANA York, Last Rate: 100 mL/hr at 03/01/25 1620, 1,000 mg at 03/01/25 1620    chlorhexidine (PERIDEX) 0.12 % oral rinse 15 mL, 15 mL, Mouth/Throat, Q12H [facility], 15 mL at 03/02/25 0833    docusate sodium (COLACE) capsule 100 mg, 100 mg, Oral, BID, SUSANA York, 100 mg at 02/28/25 1718    hydrocortisone (Solu-CORTEF) injection 50 mg, 50 mg, Intravenous, Q6H Artemio SOLORZANO CRNP, 50 mg at 03/02/25 1154    insulin glargine (LANTUS) subcutaneous injection 10 Units 0.1 mL, 10 Units, Subcutaneous, HS, SUSANA York, 10 Units at 03/01/25 2147    insulin lispro (HumALOG/ADMELOG) 100 units/mL subcutaneous injection 2-12 Units, 2-12 Units, Subcutaneous, TID AC, 2 Units at 03/02/25 1154 **AND** Fingerstick Glucose (POCT), , , TID AC, SUSANA oYrk    insulin lispro (HumALOG/ADMELOG) 100 units/mL subcutaneous injection 2-12  Units, 2-12 Units, Subcutaneous, HS, SUSANA York, 2 Units at 03/01/25 2147    latanoprost (XALATAN) 0.005 % ophthalmic solution 1 drop, 1 drop, Both Eyes, HS, SUSANA Rodriguez, 1 drop at 03/01/25 2147    midodrine (PROAMATINE) tablet 10 mg, 10 mg, Oral, TID AC, SUSANA York, 10 mg at 03/02/25 0611    norepinephrine (LEVOPHED) 4 mg (STANDARD CONCENTRATION) IV in sodium chloride 0.9% 250 mL, 1-30 mcg/min, Intravenous, Titrated, SUSANA York, Stopped at 03/02/25 0212    pantoprazole (PROTONIX) injection 40 mg, 40 mg, Intravenous, Q24H RASHAD, SUSANA Arshad, 40 mg at 03/02/25 0521    tamsulosin (FLOMAX) capsule 0.4 mg, 0.4 mg, Oral, Daily With Dinner, Pablito Cesar MD, 0.4 mg at 03/01/25 1619    timolol (TIMOPTIC) 0.5 % ophthalmic solution 1 drop, 1 drop, Both Eyes, Daily, SUSANA Rodriguez, 1 drop at 03/02/25 0833      Lab Results: I have reviewed the following results:  Results from last 7 days   Lab Units 03/02/25  0518 03/01/25  2209 03/01/25  1336 03/01/25  0531 02/28/25  1956 02/28/25  1358 02/28/25  0605 02/27/25  0514 02/26/25  0519 02/25/25  1430   WBC Thousand/uL 12.80*  --   --  12.64*  --   --  6.06 4.95 5.02 5.62   HEMOGLOBIN g/dL 8.0*  --   --  8.7*  --   --  9.2* 8.4* 9.0* 9.7*   HEMATOCRIT % 24.1*  --   --  27.2*  --   --  29.5* 26.1* 27.3* 28.9*   PLATELETS Thousands/uL 43*  --   --  54*  --   --  35* 42* 38* 35*   POTASSIUM mmol/L 4.4 5.0 4.6 4.4 4.1 4.3 4.2 3.9 4.3 4.7   CHLORIDE mmol/L 104 102 105 102 103 103 106 104 103 99   CO2 mmol/L 19* 18* 19* 18* 19* 20* 18* 20* 20* 22   BUN mg/dL 112* 111* 111* 106* 102* 103* 101* 104* 103* 101*   CREATININE mg/dL 4.45* 4.32* 4.32* 4.05* 3.93* 3.90* 3.69* 3.76* 3.60* 3.78*   CALCIUM mg/dL 7.9* 8.0* 8.2* 8.2* 8.3* 8.4 7.8* 7.5* 7.2* 7.6*   MAGNESIUM mg/dL 2.4  --   --  2.4  --   --  2.4 2.4 1.9  --    PHOSPHORUS mg/dL 5.6*  --   --  5.8*  --   --  5.4*  --  4.6*  --   "  ALBUMIN g/dL  --   --   --   --   --   --   --  2.3*  --  2.6*       Administrative Statements     Portions of the record may have been created with voice recognition software. Occasional wrong word or \"sound a like\" substitutions may have occurred due to the inherent limitations of voice recognition software. Read the chart carefully and recognize, using context, where substitutions have occurred.If you have any questions, please contact the dictating provider.  "

## 2025-03-02 NOTE — PLAN OF CARE
Problem: PAIN - ADULT  Goal: Verbalizes/displays adequate comfort level or baseline comfort level  Description: Interventions:  - Encourage patient to monitor pain and request assistance  - Assess pain using appropriate pain scale  - Administer analgesics based on type and severity of pain and evaluate response  - Implement non-pharmacological measures as appropriate and evaluate response  - Consider cultural and social influences on pain and pain management  - Notify physician/advanced practitioner if interventions unsuccessful or patient reports new pain  Outcome: Progressing     Problem: INFECTION - ADULT  Goal: Absence or prevention of progression during hospitalization  Description: INTERVENTIONS:  - Assess and monitor for signs and symptoms of infection  - Monitor lab/diagnostic results  - Monitor all insertion sites, i.e. indwelling lines, tubes, and drains  - Monitor endotracheal if appropriate and nasal secretions for changes in amount and color  - [place] appropriate cooling/warming therapies per order  - Administer medications as ordered  - Instruct and encourage patient and family to use good hand hygiene technique  - Identify and instruct in appropriate isolation precautions for identified infection/condition  Outcome: Progressing     Problem: DISCHARGE PLANNING  Goal: Discharge to home or other facility with appropriate resources  Description: INTERVENTIONS:  - Identify barriers to discharge w/patient and caregiver  - Arrange for needed discharge resources and transportation as appropriate  - Identify discharge learning needs (meds, wound care, etc.)  - Arrange for interpretive services to assist at discharge as needed  - Refer to Case Management Department for coordinating discharge planning if the patient needs post-hospital services based on physician/advanced practitioner order or complex needs related to functional status, cognitive ability, or social support system  Outcome: Progressing      Problem: Knowledge Deficit  Goal: Patient/family/caregiver demonstrates understanding of disease process, treatment plan, medications, and discharge instructions  Description: Complete learning assessment and assess knowledge base.  Interventions:  - Provide teaching at level of understanding  - Provide teaching via preferred learning methods  Outcome: Progressing     Problem: NEUROSENSORY - ADULT  Goal: Achieves stable or improved neurological status  Description: INTERVENTIONS  - Monitor and report changes in neurological status  - Monitor vital signs such as temperature, blood pressure, glucose, and any other labs ordered   - Initiate measures to prevent increased intracranial pressure  - Monitor for seizure activity and implement precautions if appropriate      Outcome: Progressing

## 2025-03-02 NOTE — ASSESSMENT & PLAN NOTE
Patient alert and oriented on arrival to the ED, goals of care discussion held with patient and ED physician  Likely metabolic encephalopathy in the setting of UTI, now worsening in setting of KARINA/uremia  VBG without hypercarbia  Ammonia 29  TSH 9  Hold sedating meds   [facility] ICU  Sleep hygiene

## 2025-03-02 NOTE — QUICK NOTE
I discussed with González regarding Yao's current declining medical condition. He is in agreement that he does not want renal repalcement therapy for his father's declining renal function. If Yao remains off of vasopressors then he would like to consider discharge home with hospice. All his questions answered to his understanding. Psychosocial support provided.

## 2025-03-02 NOTE — ASSESSMENT & PLAN NOTE
Serum bicarb 18-19 in setting of worsening KARINA  Appreciate Nephrology following- patient is not a candidate for HD   Continue with goal MAP >65, see plan as outlined  Trend BMP

## 2025-03-02 NOTE — ASSESSMENT & PLAN NOTE
Lab Results   Component Value Date    HGBA1C 7.7 (H) 12/07/2024   -- Hemoglobin A1c 7.7  -- Insulin management as per critical care    Recent Labs     03/01/25  1624 03/01/25  2130 03/02/25  0736 03/02/25  1143   POCGLU 216* 163* 172* 175*       Blood Sugar Average: Last 72 hrs:  (P) 173.4

## 2025-03-02 NOTE — ASSESSMENT & PLAN NOTE
Lab Results   Component Value Date    EGFR 11 03/01/2025    EGFR 11 03/01/2025    EGFR 12 03/01/2025    CREATININE 4.32 (H) 03/01/2025    CREATININE 4.32 (H) 03/01/2025    CREATININE 4.05 (H) 03/01/2025       Creatinine 3.78 on admission  Baseline creatinine 2.2-2.5?  S/p IV resuscitation in the ED  CT A/P without hydronephrosis or evidence of obstruction   Creatinine initially improved but then vaishnavi.   Dupont placed 2/27 after failing urinary retention protocol   S/p lasix 20mg 2/28 with no response.  Urine output down-trending, serum bicarb 18-20  Appreciate Neurology following, not a candidate for HD  Close I&O  Continue flomax  Avoid nephrotoxins

## 2025-03-02 NOTE — ASSESSMENT & PLAN NOTE
Lab Results   Component Value Date    HGBA1C 7.7 (H) 12/07/2024       Recent Labs     03/01/25  0729 03/01/25  1101 03/01/25  1624 03/01/25  2130   POCGLU 239* 228* 216* 163*       Blood Sugar Average: Last 72 hrs:  (P) [id number]    Hold home amaryl  Continue SSI Q6   Lantus 10u added 3/1  Goal BG <180

## 2025-03-02 NOTE — ASSESSMENT & PLAN NOTE
Wt Readings from Last 3 Encounters:   03/02/25 69.8 kg (153 lb 14.1 oz)   01/14/25 69.9 kg (154 lb 1.6 oz)   01/06/25 70.3 kg (155 lb)   -- Chest x-ray shows moderate pulmonary edema  -- Underlying septic shock  -- Concurrent acute kidney injury  --No objections to as needed IV diuretics  -- Ejection fraction 40%

## 2025-03-02 NOTE — ASSESSMENT & PLAN NOTE
Patient presents with hypotension, hypothermia. Likely secondary to septic shock in the setting of UTI. Hx of multiple UTI's. Also has a history of exudative pleural effusion in 2023. However, cannot rule out obstructive shock (hx of pericardial effusion s/p drainage in 2023) & hypovolemia at this time   LA 0.7, initial procal 0.34  UA with innumerable bacteria, WBC, negative nitrites, + large leuk. Hx of UTIs w/ proteus. Most recently in January of this year secondary to urinary retention.  CXR: vascular congestion with small bilateral pleural effusions. No obvious consolidations  CT A/P: Diffuse mild colonic wall thickening which may be due to underdistention or colitis. Evaluation is limited due to lack of oral and intravenous contrast.Urinary bladder is underdistended however appears somewhat thick walled. Correlation with urinalysis is recommended.Partially imaged bilateral pleural effusions. Chronic right pleural thickening which may be on the basis of infection or other etiologies. Bibasilar opacities, likely atelectasis.Cardiomegaly with moderate pericardial effusion.   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

## 2025-03-02 NOTE — ASSESSMENT & PLAN NOTE
Platelets 35K on arrival  Baseline platelets   Thrombocytopenia possibly in the setting of sepsis  Hold eliquis and DVT ppx for now  Consider resuming if platelets remain >50K today   Monitor for signs/symptoms of bleeding  Continue to trend

## 2025-03-02 NOTE — ASSESSMENT & PLAN NOTE
-- Meeting sepsis criteria  --Lactic acid normal with initial procalcitonin level 0.3.  --Urine analysis shows innumerable bacteria and WBCs.  Patient has a history of multiple urinary tract infections in the past.  --Chest x-ray shows vascular pulmonary congestion  --CT scan shows diffuse mild colonic wall thickening possible colitis.  --Viral panel including COVID-19 and influenza were negative  --C. difficile positive but no active infection  --Blood culture growing out corynebacterium suspected contaminant.  --Urine culture growing out Citrobacter  --Currently off norepinephrine drip  --Antibiotics narrowed to IV ceftriaxone  --Currently on hydrocortisone 50 mg every 6 hours  -- Off IV fluids

## 2025-03-02 NOTE — ASSESSMENT & PLAN NOTE
UA concerning for UTI  Patient has hx of frequent UTI's related to retention  Most recent UTI in January. Cultures grew proteus on multiple occasions. No hx of MDRO  Previously had victor in place, d/c'ed 1/8/25  CT A/P bladder wall thicking. No hydro or obstruction   Antibiotics de-escalated to  Ceftriaxone, Abx D6  Urine culture growing citrobacter  BC 1/2 growing Corynebacterium- likely contaminant  Failed urinary retention protocol, victor placed 2/27  Continue flomax

## 2025-03-02 NOTE — PROGRESS NOTES
Progress Note - Critical Care/ICU   Name: Yao Tipton 84 y.o. male I MRN: [id number]  Unit/Bed#: ICU 05 I Date of Admission: 2/25/2025   Date of Service: 3/2/2025 I Hospital Day: 5      Assessment & Plan  Septic shock (HCC)  Patient presents with hypotension, hypothermia. Likely secondary to septic shock in the setting of UTI. Hx of multiple UTI's. Also has a history of exudative pleural effusion in 2023. However, cannot rule out obstructive shock (hx of pericardial effusion s/p drainage in 2023) & hypovolemia at this time   LA 0.7, initial procal 0.34  UA with innumerable bacteria, WBC, negative nitrites, + large leuk. Hx of UTIs w/ proteus. Most recently in January of this year secondary to urinary retention.  CXR: vascular congestion with small bilateral pleural effusions. No obvious consolidations  CT A/P: Diffuse mild colonic wall thickening which may be due to underdistention or colitis. Evaluation is limited due to lack of oral and intravenous contrast.Urinary bladder is underdistended however appears somewhat thick walled. Correlation with urinalysis is recommended.Partially imaged bilateral pleural effusions. Chronic right pleural thickening which may be on the basis of infection or other etiologies. Bibasilar opacities, likely atelectasis.Cardiomegaly with moderate pericardial effusion.   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 Cultures grew proteus on multiple occasions. No hx of MDRO  Previously had victor in place, d/c'ed 1/8/25  CT A/P bladder wall thicking. No hydro or obstruction   Antibiotics de-escalated to  Ceftriaxone, Abx D6  Urine culture growing citrobacter  BC 1/2 growing Corynebacterium- likely contaminant  Failed urinary retention protocol, victor placed 2/27  Continue flomax   Pericardial effusion  History of pericardial effusion in June of 2023. Originally presented with concern for sepsis. Found to have a pericardial effusion and was transferred to [facility] and underwent pericardiocentesis on 6/30/23 by Dr. Adame. Unclear etiology per previous notes. Cultures negative however WBC 1,280 in pericardial fluid. Negative for malignancy  2/25: CT A/P showing moderate pericardial effusion   2/26 Echo: EF 60%, There is a moderate pericardial effusion. Glides mainly to apical area as well as right ventricular and right atrium with no evidence of tamponade. Pericardium is very thickened. Certainly we cannot rule out chronic constriction.   Eliquis currently on hold   Given rising creatinine, consider limited echo to assess for increased pericardial effusion /tamponade   Goal MAP >65    Acute kidney injury superimposed on chronic kidney disease  (HCC)  Lab Results   Component Value Date    EGFR 11 03/01/2025    EGFR 11 03/01/2025    EGFR 12 03/01/2025    CREATININE 4.32 (H) 03/01/2025    CREATININE 4.32 (H) 03/01/2025    CREATININE 4.05 (H) 03/01/2025       Creatinine 3.78 on admission  Baseline creatinine 2.2-2.5?  S/p IV resuscitation in the ED  CT A/P without hydronephrosis or evidence of obstruction   Creatinine initially improved but then vaishnavi.   Victor placed 2/27 after failing urinary retention protocol   S/p lasix 20mg 2/28 with no  "response.  Urine output down-trending, serum bicarb 18-20  Appreciate Neurology following, not a candidate for HD  Close I&O  Continue flomax  Avoid nephrotoxins   Hypothermia  Hypothermia, likely secondary to sepsis  given 50mg solucortef in the ED, cortisol level unfortunately obtained after hydrocortisone administration   TSH 9, free T4 0.83   2/27 hydrocortisone 50mg Q6 initiated due to refractory hypothermia, hypotension and hypoglycemia   Ruben martín PRN to achieve normothermia  Thrombocytopenia (HCC)  Platelets 35K on arrival  Baseline platelets   Thrombocytopenia possibly in the setting of sepsis  Hold eliquis and DVT ppx for now  Consider resuming if platelets remain >50K today   Monitor for signs/symptoms of bleeding  Continue to trend   Chronic combined systolic and diastolic CHF (congestive heart failure) (HCC)  Wt Readings from Last 3 Encounters:   03/01/25 70.6 kg (155 lb 10.3 oz)   01/14/25 69.9 kg (154 lb 1.6 oz)   01/06/25 70.3 kg (155 lb)       Most recent Echo 12/9/24: EF 45%, abnormal diastolic dysfunction, mildly reduced RV function.   Home meds: torsemide 40mg BID  Lower extremity edema present but mucus membranes appear dry, weight down 4KG from one month ago    S/p fluid resuscitation in the ED  CXR consistent with vascular congestion and small pleural effusions  Holding scheduled diuretics. S/p furosemide 20mg 2/28 with poor response. Now with rise in creatinine. Hold additional diuresis for now. Can consider 1x dose for respiratory distress  Close I&Os  Daily weights      Chronic atrial fibrillation (HCC)  Chronic afib on eliquis  Not on rate control at this time  Currently HR in the 80-90s  Hold eliquis secondary to thrombocytopenia   Diarrhea  Episodes of diarrhea in the ED  C diff positive, EIA negative  Enteric stool panel negative   CT \"Diffuse mild colonic wall thickening which may be due to underdistention or colitis. Evaluation is limited due to lack of oral and " "intravenous contrast.\"  Monitor output   Diabetes mellitus with peripheral artery disease (HCC)  Lab Results   Component Value Date    HGBA1C 7.7 (H) 12/07/2024       Recent Labs     03/01/25  0729 03/01/25  1101 03/01/25  1624 03/01/25  2130   POCGLU 239* 228* 216* 163*       Blood Sugar Average: Last 72 hrs:  (P) [id number]    Hold home amaryl  Continue SSI Q6   Lantus 10u added 3/1  Goal BG <180  Pleural effusion  Small bilateral pleural effusions present on CXR  CT with evidence of \"Partially imaged bilateral pleural effusions. Chronic right pleural thickening which may be on the basis of infection or other etiologies. Bibasilar opacities, likely atelectasis. '  Hx of exudative effusion s/p chest tube placement and tunneled pleural drain placed in 2023, s/p removal   Cultures negative, cytology negative  Thought to be hydropneumothorax ?  Respiratory status currently stable on RA  Metabolic encephalopathy  Patient alert and oriented on arrival to the ED, goals of care discussion held with patient and ED physician  Likely metabolic encephalopathy in the setting of UTI, now worsening in setting of KARINA/uremia  VBG without hypercarbia  Ammonia 29  TSH 9  Hold sedating meds   CAM ICU  Sleep hygiene   High anion gap metabolic acidosis  Serum bicarb 18-19 in setting of worsening KARINA  Appreciate Nephrology following- patient is not a candidate for HD   Continue with goal MAP >65, see plan as outlined  Trend BMP  Disposition: Stepdown Level 1    ICU Core Measures     A: Assess, Prevent, and Manage Pain Has pain been assessed? Yes  Need for changes to pain regimen? No   B: Both SAT/SAT  N/A   C: Choice of Sedation RASS Goal: N/A patient not on sedation  Need for changes to sedation or analgesia regimen? NA   D: Delirium CAM-ICU: Unable to perform secondary to Acute cognitive dysfunction   E: Early Mobility  Plan for early mobility? Yes   F: Family Engagement Plan for family engagement today? Yes       Antibiotic " Review: Patient on appropriate coverage based on culture data.     Review of Invasive Devices:    Dupont Plan: Previous voiding trial failed, continues on Flomax. GOC ongoing, consideration for Urology consult if needed in upcoming days.         Prophylaxis:  VTE VTE covered by:    None       Stress Ulcer  covered byfamotidine (PEPCID) 10 mg tablet [[id number]] (Long-Term Med), pantoprazole (PROTONIX) injection 40 mg [[id number]]         24 Hour Events : Mentation has declined in the setting of worsening kidney failure and uremia. He is lethargic, but does still awaken to voice, drifts to sleep quickly when stimulation removed. Was seen by Nephrology yesterday and patient is not a candidate for hemodialysis. Was requiring low dose levophed to meet goal MAP >65. This was able to be discontinued around 0200 today. He remains on stress dose steroids, and midodrine which was increased yesterday.  Blood glucose has been elevated, he was started on lantus last evening for improved control. No acute changes overnight.     Subjective   Review of Systems: Review of Systems not obtainable due to Altered mental status    Objective :                   Vitals I/O      Most Recent Min/Max in 24hrs   Temp 99.5 °F (37.5 °C) Temp  Min: 94.7 °F (34.8 °C)  Max: 99.5 °F (37.5 °C)   Pulse 82 Pulse  Min: 57  Max: 86   Resp (!) 27 Resp  Min: 12  Max: 43   /53 BP  Min: 88/53  Max: 154/91   O2 Sat 96 % SpO2  Min: 95 %  Max: 99 %      Intake/Output Summary (Last 24 hours) at 3/2/2025 0257  Last data filed at 3/1/2025 2328  Gross per 24 hour   Intake 345.02 ml   Output 250 ml   Net 95.02 ml       Diet Cardiovascular; Sodium 2 GM; Dysphagia 1-Pureed; Thin Liquid    Invasive Monitoring   N/A        Physical Exam   Physical Exam  Vitals and nursing note reviewed.   Eyes:      General: Lids are normal.   Skin:     General: Skin is warm and dry.      Coloration: Skin is pale.      Comments: Scattered bruising/petechia along chest. Ecchymosis at  sternal notch   HENT:      Head: Normocephalic and atraumatic.   Cardiovascular:      Rate and Rhythm: Normal rate. Rhythm irregular.      Pulses:           Radial pulses are 2+ on the right side and 2+ on the left side.      Heart sounds: Normal heart sounds.   Musculoskeletal:      Right lower le+ Edema present.      Left lower le+ Edema present.   Abdominal: General: Bowel sounds are normal.      Palpations: Abdomen is soft.   Constitutional:       General: He is not in acute distress.     Appearance: He is ill-appearing and toxic-appearing.   Pulmonary:      Effort: Pulmonary effort is normal.      Breath sounds: Examination of the right-lower field reveals decreased breath sounds. Examination of the left-lower field reveals decreased breath sounds. Decreased breath sounds present.   Psychiatric:         Mood and Affect: Affect is flat.         Speech: Speech is delayed.         Behavior: Behavior is slowed and withdrawn.   Neurological:      General: No focal deficit present.      Mental Status: He is lethargic, disoriented to place, disoriented to time and disoriented to situation.      GCS: GCS eye subscore is 3. GCS verbal subscore is 4. GCS motor subscore is 6.   Genitourinary/Anorectal:  Dupont present.        Diagnostic Studies        Lab Results: I have reviewed the following results: see below  EKG: Atrial fibrillation on monitor, rate 70-80s.  Imaging: No new imaging in preceding 24h     Medications:  Scheduled PRN   atorvastatin, 40 mg, Daily With Dinner  cefTRIAXone, 1,000 mg, Q24H  chlorhexidine, 15 mL, Q12H RASHAD  docusate sodium, 100 mg, BID  hydrocortisone sodium succinate, 50 mg, Q6H RASHAD  insulin glargine, 10 Units, HS  insulin lispro, 2-12 Units, TID AC  insulin lispro, 2-12 Units, HS  latanoprost, 1 drop, HS  midodrine, 10 mg, TID AC  pantoprazole, 40 mg, Q24H RASHAD  tamsulosin, 0.4 mg, Daily With Dinner  timolol, 1 drop, Daily          Continuous    norepinephrine, 1-30 mcg/min, Last Rate:  Stopped (03/02/25 0212)         Labs:   CBC    Recent Labs     02/28/25  0605 03/01/25  0531   WBC 6.06 12.64*   HGB 9.2* 8.7*   HCT 29.5* 27.2*   PLT 35* 54*     BMP    Recent Labs     03/01/25  1336 03/01/25  2209   SODIUM 136 133*   K 4.6 5.0    102   CO2 19* 18*   AGAP 12 13   * 111*   CREATININE 4.32* 4.32*   CALCIUM 8.2* 8.0*       Coags    Recent Labs     02/28/25  0605   INR 1.47*        Additional Electrolytes  Recent Labs     02/28/25  0605 03/01/25  0531   MG 2.4 2.4   PHOS 5.4* 5.8*   CAIONIZED 1.07*  --           Blood Gas    No recent results  Recent Labs     03/01/25  0531   PHVEN 7.301   KVG9BVR 45.2   PO2VEN 46.1*   RBI2EER 21.8*   BEVEN -4.5   L6OFBQT 76.9    LFTs  No recent results    Infectious  Recent Labs     03/01/25  0531   PROCALCITONI 0.31*     Glucose  Recent Labs     02/28/25  1956 03/01/25  0531 03/01/25  1336 03/01/25 2209   GLUC 213* 258* 236* 184*

## 2025-03-02 NOTE — ASSESSMENT & PLAN NOTE
History of pericardial effusion in June of 2023. Originally presented with concern for sepsis. Found to have a pericardial effusion and was transferred to [facility] and underwent pericardiocentesis on 6/30/23 by Dr. Adame. Unclear etiology per previous notes. Cultures negative however WBC 1,280 in pericardial fluid. Negative for malignancy  2/25: CT A/P showing moderate pericardial effusion   2/26 Echo: EF 60%, There is a moderate pericardial effusion. Glides mainly to apical area as well as right ventricular and right atrium with no evidence of tamponade. Pericardium is very thickened. Certainly we cannot rule out chronic constriction.   Eliquis currently on hold   Given rising creatinine, consider limited echo to assess for increased pericardial effusion /tamponade   Goal MAP >65     Normal for race

## 2025-03-02 NOTE — ASSESSMENT & PLAN NOTE
-- Management as per critical care  --2D echocardiogram reviewed.  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

## 2025-03-02 NOTE — ASSESSMENT & PLAN NOTE
Wt Readings from Last 3 Encounters:   03/01/25 70.6 kg (155 lb 10.3 oz)   01/14/25 69.9 kg (154 lb 1.6 oz)   01/06/25 70.3 kg (155 lb)       Most recent Echo 12/9/24: EF 45%, abnormal diastolic dysfunction, mildly reduced RV function.   Home meds: torsemide 40mg BID  Lower extremity edema present but mucus membranes appear dry, weight down 4KG from one month ago    S/p fluid resuscitation in the ED  CXR consistent with vascular congestion and small pleural effusions  Holding scheduled diuretics. S/p furosemide 20mg 2/28 with poor response. Now with rise in creatinine. Hold additional diuresis for now. Can consider 1x dose for respiratory distress  Close I&Os  Daily weights

## 2025-03-03 PROBLEM — R19.7 DIARRHEA: Status: RESOLVED | Noted: 2023-06-24 | Resolved: 2025-01-01

## 2025-03-03 NOTE — ASSESSMENT & PLAN NOTE
History of CKD stage IV follows with Dr. 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/cardiorenal syndrome  - Acute kidney injury present on admission  - CT scan without hydronephrosis-diffuse colonic wall thickening Limited exam, urinary bladder under distended, bilateral pleural effusion partially imaged, bilateral opacities, cardiomegaly with pericardial effusion  - Echocardiogram February 26-EF 60%, no regional wall motion abnormality, atrium moderately dilated, trace aortic regurg, mild mitral regurg, PASP 40, moderate pericardial effusion with no evidence of tamponade but cannot rule out chronic constriction, left pleural effusion, right pleural effusion  - Initially septic shock requiring norepinephrine  - Initially oliguric and continues to have minimal urine output in the oliguric range  - Currently off vasopressors and off intravenous fluids  - Goals of care discussion-primary team reports that the patient's family will be arriving today for consideration of transitioning patient to home hospice.  From renal standpoint patient continues to remain lethargic.  Hemodynamics improved with steroids and vasopressor and now on midodrine.  - Dialysis planning-noted outpatient discussions were in progress regarding dialysis modality.  Agree with prior rounding nephrologist that from a renal standpoint patient appears to be a poor dialysis candidate due to comorbidities and per discussions with primary team with family, primary team states that the family has also opted for no dialysis    Plan  - Lab work this morning pending  - I/os; avoid nephrotoxic agents  - Avoid hypotension  - Please call the renal team if questions or issues arise as the day progresses  - Continue supportive measures with midodrine as doing  - No objection to albumin if needed for increased oncotic pressures  - No objection to furosemide  if needed but currently is on room air and in no respiratory distress

## 2025-03-03 NOTE — ASSESSMENT & PLAN NOTE
Lab Results   Component Value Date    EGFR 10 03/02/2025    EGFR 11 03/02/2025    EGFR 11 03/01/2025    CREATININE 4.65 (H) 03/02/2025    CREATININE 4.45 (H) 03/02/2025    CREATININE 4.32 (H) 03/01/2025       Creatinine 3.78 on admission  Baseline creatinine 2.2-2.5?  S/p IV resuscitation in the ED  CT A/P without hydronephrosis or evidence of obstruction   Creatinine initially improved but then vaishnavi.   Dupont placed 2/27 after failing urinary retention protocol   S/p lasix 20mg 2/28 with no response.  Urine output down-trending, serum bicarb 18-20  Appreciate Neurology following, not a candidate for HD  Given 80mg IV lasix overnight without response   Close I&O  Continue flomax  Avoid nephrotoxins

## 2025-03-03 NOTE — ASSESSMENT & PLAN NOTE
UA concerning for UTI  Patient has hx of frequent UTI's related to retention  Most recent UTI in January. Cultures grew proteus on multiple occasions. No hx of MDRO  Previously had victor in place, d/c'ed 1/8/25  CT A/P bladder wall thicking. No hydro or obstruction   Antibiotics de-escalated to  Ceftriaxone, Abx D7. Will D/C after today   Urine culture growing citrobacter  BC 1/2 growing Corynebacterium- likely contaminant  Failed urinary retention protocol, victor placed 2/27  Continue flomax

## 2025-03-03 NOTE — PLAN OF CARE
Problem: Potential for Falls  Goal: Patient will remain free of falls  Description: INTERVENTIONS:  - Educate patient/family on patient safety including physical limitations  - Instruct patient to call for assistance with activity   - Consult OT/PT to assist with strengthening/mobility   - Keep Call bell within reach  - Keep bed low and locked with side rails adjusted as appropriate  - Keep care items and personal belongings within reach  - Initiate and maintain comfort rounds  - Make Fall Risk Sign visible to staff  - Offer Toileting every 2 Hours, in advance of need  - Initiate/Maintain bed/chair alarm  - Obtain necessary fall risk management equipment  - Apply yellow socks and bracelet for high fall risk patients  - Consider moving patient to room near nurses station  Outcome: Progressing     Problem: PAIN - ADULT  Goal: Verbalizes/displays adequate comfort level or baseline comfort level  Description: Interventions:  - Encourage patient to monitor pain and request assistance  - Assess pain using appropriate pain scale  - Administer analgesics based on type and severity of pain and evaluate response  - Implement non-pharmacological measures as appropriate and evaluate response  - Consider cultural and social influences on pain and pain management  - Notify physician/advanced practitioner if interventions unsuccessful or patient reports new pain  Outcome: Progressing     Problem: INFECTION - ADULT  Goal: Absence or prevention of progression during hospitalization  Description: INTERVENTIONS:  - Assess and monitor for signs and symptoms of infection  - Monitor lab/diagnostic results  - Monitor all insertion sites, i.e. indwelling lines, tubes, and drains  - Monitor endotracheal if appropriate and nasal secretions for changes in amount and color  - [place] appropriate cooling/warming therapies per order  - Administer medications as ordered  - Instruct and encourage patient and family to use good hand hygiene  technique  - Identify and instruct in appropriate isolation precautions for identified infection/condition  Outcome: Progressing     Problem: DISCHARGE PLANNING  Goal: Discharge to home or other facility with appropriate resources  Description: INTERVENTIONS:  - Identify barriers to discharge w/patient and caregiver  - Arrange for needed discharge resources and transportation as appropriate  - Identify discharge learning needs (meds, wound care, etc.)  - Arrange for interpretive services to assist at discharge as needed  - Refer to Case Management Department for coordinating discharge planning if the patient needs post-hospital services based on physician/advanced practitioner order or complex needs related to functional status, cognitive ability, or social support system  Outcome: Progressing

## 2025-03-03 NOTE — PROGRESS NOTES
Progress Note - Nephrology   Name: Yao Tipton 84 y.o. male I MRN: [id number]  Unit/Bed#: ICU 05 I Date of Admission: 2/25/2025   Date of Service: 3/3/2025 I Hospital Day: 6    Assessment & Plan  Acute kidney injury superimposed on chronic kidney disease  (HCC)  History of CKD stage IV follows with Dr. 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/cardiorenal syndrome  - Acute kidney injury present on admission  - CT scan without hydronephrosis-diffuse colonic wall thickening Limited exam, urinary bladder under distended, bilateral pleural effusion partially imaged, bilateral opacities, cardiomegaly with pericardial effusion  - Echocardiogram February 26-EF 60%, no regional wall motion abnormality, atrium moderately dilated, trace aortic regurg, mild mitral regurg, PASP 40, moderate pericardial effusion with no evidence of tamponade but cannot rule out chronic constriction, left pleural effusion, right pleural effusion  - Initially septic shock requiring norepinephrine  - Initially oliguric and continues to have minimal urine output in the oliguric range  - Currently off vasopressors and off intravenous fluids  - Goals of care discussion-primary team reports that the patient's family will be arriving today for consideration of transitioning patient to home hospice.  From renal standpoint patient continues to remain lethargic.  Hemodynamics improved with steroids and vasopressor and now on midodrine.  - Dialysis planning-noted outpatient discussions were in progress regarding dialysis modality.  Agree with prior rounding nephrologist that from a renal standpoint patient appears to be a poor dialysis candidate due to comorbidities and per discussions with primary team with family, primary team states that the family has also opted for no dialysis    Plan  - Lab work this morning pending  - I/os; avoid nephrotoxic  agents  - Avoid hypotension  - Please call the renal team if questions or issues arise as the day progresses  - Continue supportive measures with midodrine as doing  - No objection to albumin if needed for increased oncotic pressures  - No objection to furosemide if needed but currently is on room air and in no respiratory distress    High anion gap metabolic acidosis  -Lab work pending this morning  - Prior lab work anion gap closed.  Bicarb is slowly improving as of yesterday  Septic shock (HCC)  -Lactic acid initially normal and procalcitonin 0.3  - Urinalysis with innumerable bacteria and WBC.  Has had a history of multiple UTIs in the past  - Chest x-ray with pulmonary congestion  - CT chest with colonic wall thickening with concern for colitis  - Viral panel unrevealing for COVID and influenza  - C. 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  Cannot rule out uremia.  Pleural effusion  Bilateral pleural effusions-management per primary team  Chronic combined systolic and diastolic CHF (congestive heart failure) ([facility])  -Monitor for now per primary team  UTI (urinary tract infection)  Per primary team    I have reviewed the nephrology recommendations including cont current regimen and call renal if further questions arise, with primary team AP in person, and we are in agreement with renal plan including the information outlined above.      Subjective   Brief History of Admission -84-year-old male with a past medical history of CKD stage IV-V, CHF, A-fib, hypertension, diabetes, BPH, chronic thrombocytopenia, urinary retention December 2024, who initially presents to the hospital with decreased oral intake, nausea, vomiting, hypothermia, hypotensive and concern for sepsis and septic shock.  Nephrology on board for acute kidney injury.    Currently off vasopressors.  Blood pressure is 94-1 20 systolic.  On room air.  Afebrile.  Is lethargic.  No longer hypothermic.  Urine output 350 cc recorded.    Objective :  Temp:  [94.4 °F (34.7 °C)-97.8 °F (36.6 °C)] 97.8 °F (36.6 °C)  HR:  [67-99] 89  BP: ()/(50-81) 126/81  Resp:  [14-33] 20  SpO2:  [96 %-99 %] 97 %  O2 Device: None (Room air)    Current Weight: Weight - Scale: 72.1 kg (158 lb 15.2 oz)  First Weight: Weight - Scale: 65 kg (143 lb 4.8 oz)  I/O         03/01 0701  03/02 0700 03/02 0701  03/03 0700 03/03 0701  03/04 0700    P.O. 150      I.V. (mL/kg) 261.1 (3.7) 60 (0.8)     IV Piggyback  50     Total Intake(mL/kg) 411.1 (5.9) 110 (1.5)     Urine (mL/kg/hr) 300 (0.2) 350 (0.2)     Stool       Total Output 300 350     Net +111.1 -240                  Physical Exam  General: NAD but appears ill  Skin: Diffuse skin bruising, some nonblanching less than 1 mm red macular rash upper shoulders, tibial region  Eyes: anicteric sclera  ENT: Dry mucous membrane  Neck: supple  Chest: CTA b/l, no ronchii, no wheeze, no rubs, no rales but diminished intake at bases  CVS: s1s2, no murmur, no gallop, no rub  Abdomen: soft, but appears nl sounds  Extremities: 1+ edema LE b/l  :Dupont catheter with clear yellow urine  Neuro:  is lethargic.  Difficult to assess  Psych: Lethargic.  Difficult to assess    Medications:    Current Facility-Administered Medications:     atorvastatin (LIPITOR) tablet 40 mg, 40 mg, Oral, Daily With Dinner, SUSANA Rodriguez, 40 mg at 03/01/25 1620    cefTRIAXone  (ROCEPHIN) IVPB (premix in dextrose) 1,000 mg 50 mL, 1,000 mg, Intravenous, Q24H, SUSANA York, Stopped at 03/02/25 1720    chlorhexidine (PERIDEX) 0.12 % oral rinse 15 mL, 15 mL, Mouth/Throat, Q12H [facility], SUSANA Rodriguez, 15 mL at 03/02/25 2044    docusate sodium (COLACE) capsule 100 mg, 100 mg, Oral, BID, SUSANA York, 100 mg at 02/28/25 1718    hydrocortisone (Solu-CORTEF) injection 50 mg, 50 mg, Intravenous, Q6H [facility], 50 mg at 03/03/25 0551    insulin lispro (HumALOG/ADMELOG) 100 units/mL subcutaneous injection 2-12 Units, 2-12 Units, Subcutaneous, TID AC, 2 Units at 03/03/25 0636 **AND** Fingerstick Glucose (POCT), , , TID AC, SUSANA York    insulin lispro (HumALOG/ADMELOG) 100 units/mL subcutaneous injection 2-12 Units, 2-12 Units, Subcutaneous, HS, SUSANA York, 2 Units at 03/02/25 2116    latanoprost (XALATAN) 0.005 % ophthalmic solution 1 drop, 1 drop, Both Eyes, HS, SUSANA Rodriguez, 1 drop at 03/02/25 2117    midodrine (PROAMATINE) tablet 10 mg, 10 mg, Oral, TID AC, SUSANA York, 10 mg at 03/02/25 0611    pantoprazole (PROTONIX) injection 40 mg, 40 mg, Intravenous, Q24H [facility], 40 mg at 03/03/25 0551    tamsulosin (FLOMAX) capsule 0.4 mg, 0.4 mg, Oral, Daily With Dinner, Pablito Cesar MD, 0.4 mg at 03/01/25 1619    timolol (TIMOPTIC) 0.5 % ophthalmic solution 1 drop, 1 drop, Both Eyes, Daily, SUSANA Rodriguez, 1 drop at 03/02/25 0833      Lab Results: I have reviewed the following results:  Results from last 7 days   Lab Units 03/03/25  0552 03/02/25  1756 03/02/25  0518 03/01/25  2209 03/01/25  1336 03/01/25  0531 02/28/25  1956 02/28/25  1358 02/28/25  0605 02/27/25  0514 02/26/25  0519 02/25/25  1430   WBC Thousand/uL 14.10*  --  12.80*  --   --  12.64*  --   --  6.06 4.95 5.02 5.62   HEMOGLOBIN g/dL 8.9*  --  8.0*  --   --   "8.7*  --   --  9.2* 8.4* 9.0* 9.7*   HEMATOCRIT % 26.7*  --  24.1*  --   --  27.2*  --   --  29.5* 26.1* 27.3* 28.9*   PLATELETS Thousands/uL 44*  --  43*  --   --  54*  --   --  35* 42* 38* 35*   POTASSIUM mmol/L  --  4.6 4.4 5.0 4.6 4.4 4.1 4.3 4.2 3.9 4.3 4.7   CHLORIDE mmol/L  --  105 104 102 105 102 103 103 106 104 103 99   CO2 mmol/L  --  20* 19* 18* 19* 18* 19* 20* 18* 20* 20* 22   BUN mg/dL  --  120* 112* 111* 111* 106* 102* 103* 101* 104* 103* 101*   CREATININE mg/dL  --  4.65* 4.45* 4.32* 4.32* 4.05* 3.93* 3.90* 3.69* 3.76* 3.60* 3.78*   CALCIUM mg/dL  --  8.0* 7.9* 8.0* 8.2* 8.2* 8.3* 8.4 7.8* 7.5* 7.2* 7.6*   MAGNESIUM mg/dL  --   --  2.4  --   --  2.4  --   --  2.4 2.4 1.9  --    PHOSPHORUS mg/dL  --   --  5.6*  --   --  5.8*  --   --  5.4*  --  4.6*  --    ALBUMIN g/dL  --   --   --   --   --   --   --   --   --  2.3*  --  2.6*       Administrative Statements     Portions of the record may have been created with voice recognition software. Occasional wrong word or \"sound a like\" substitutions may have occurred due to the inherent limitations of voice recognition software. Read the chart carefully and recognize, using context, where substitutions have occurred.If you have any questions, please contact the dictating provider.  "

## 2025-03-03 NOTE — ASSESSMENT & PLAN NOTE
Patient presents with hypotension, hypothermia. Likely secondary to septic shock in the setting of UTI. Hx of multiple UTI's. Also has a history of exudative pleural effusion in 2023. However, cannot rule out obstructive shock (hx of pericardial effusion s/p drainage in 2023) & hypovolemia at this time   LA 0.7, initial procal 0.34  UA with innumerable bacteria, WBC, negative nitrites, + large leuk. Hx of UTIs w/ proteus. Most recently in January of this year secondary to urinary retention.  CXR: vascular congestion with small bilateral pleural effusions. No obvious consolidations  CT A/P: Diffuse mild colonic wall thickening which may be due to underdistention or colitis. Evaluation is limited due to lack of oral and intravenous contrast.Urinary bladder is underdistended however appears somewhat thick walled. Correlation with urinalysis is recommended.Partially imaged bilateral pleural effusions. Chronic right pleural thickening which may be on the basis of infection or other etiologies. Bibasilar opacities, likely atelectasis.Cardiomegaly with moderate pericardial effusion.   COVID/Flu negative on rapid antigen test  C Diff + but EIA negative suggesting colonization, no active infection   Stool enteric panel negative  2/25 BC with 1/2 growing Corynebacterium imitans (suspect contaminant)  UC growing citrobacter   TSH 9, free T4 0.83  2/26 Echo without evidence of tamponade physiology but could have some constrictive pericarditis   2/27 started on solucortef 50mg Q6 given refractory hypotension, hypoglycemia and hypothermia     Plan:   Goal MAP >65  Levo off 3/2  Midodrine 10mg TID  Stress dose steroids (consider weaning today)  Continue ceftriaxone D7  Close I&Os  Trend fever curve, WBC and procal

## 2025-03-03 NOTE — CASE MANAGEMENT
Case Management Assessment & Discharge Planning Note    Patient name Yao Tipton  Location ICU 05/ICU 05 MRN [id number]  : 1940 Date 3/3/2025       Current Admission Date: 2025  Current Admission Diagnosis:Septic shock (HCC)   Patient Active Problem List    Diagnosis Date Noted Date Diagnosed    High anion gap metabolic acidosis 2025     UTI (urinary tract infection) 2025     Generalized weakness 2025     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 (HCC) 2024     Hospital discharge follow-up 2024     Macrocytic anemia 2024     Gross hematuria 12/10/2024     Open wound of both lower extremities 2024     Unable to care for self 2024     Gout 2024     Left ankle pain 2023     Chronic combined systolic and diastolic CHF (congestive heart failure) ([facility]) 2023     Dyslipidemia 2023     BPH (benign prostatic hyperplasia) 2023     Cellulitis of left lower extremity 2023     Constipation 08/10/2023     Pleural effusion 2023     Goals of care, counseling/discussion 2023     Duodenal ulcer 07/10/2023     Metabolic encephalopathy 2023     Recent empyema of lung with persistent locuted R hydropneumothorax 2023     Hypoglycemia 2023     Thrombocytopenia ([facility]) 2023     Hypothermia 2023     Septic shock ([facility]) 2023     Acute urinary retention 2023     Macrocytosis 2023     Chronic kidney disease-mineral and bone disorder 2023     Advanced care planning/counseling discussion 2023     Acute kidney injury superimposed on chronic kidney disease  (HCC) 2022     Benign hypertension with CKD (chronic kidney disease) stage IV ([facility]) 2022     Persistent proteinuria 2022     COVID-19 2022     Electrolyte abnormality 2022     Cellulitis of left upper extremity 2021     Chronic atrial fibrillation (HCC)  01/05/2021     Vitamin D deficiency 10/18/2019     Chronic kidney disease, stage 4 (severe) ([facility]) 04/05/2019     Acute on chronic combined systolic and diastolic congestive heart failure ([facility]) 01/29/2019     Pericardial effusion 01/13/2019     Leg edema 01/10/2019     Diabetes mellitus with peripheral artery disease ([facility]) 01/10/2019     PVC (premature ventricular contraction) 12/19/2018     Essential hypertension 12/19/2018     Closed traumatic displaced fracture of distal end of left radius with malunion 02/09/2018       LOS (days): 6  Geometric Mean LOS (GMLOS) (days): 4.9  Days to GMLOS:-0.9     OBJECTIVE:    Risk of Unplanned Readmission Score: 38.16         Current admission status: Inpatient  Referral Reason: Other (Discharge planning)    Preferred Pharmacy:   [id number] - [place], [place] - [address] [address]  [address] [address]  Phone: [phone] Fax: [phone]    Primary Care Provider: Nicho Baltazar DO    Primary Insurance: MEDICARE  Secondary Insurance: BLUE CROSS    ASSESSMENT:  Active Health Care Proxies       González Tipton Health Care Agent - Son   Primary Phone: [phone] (Mobile)                  Readmission Root Cause  30 Day Readmission: No    Patient Information  Admitted from:: Home  Mental Status: Other (Comment) (lethargic)  During Assessment patient was accompanied by: Son  Assessment information provided by:: Son  Primary Caregiver: Family  Caregiver's Name:: González Tipton (son)  Caregiver's Relationship to Patient:: Family Member  Caregiver's Telephone Number:: [phone]  Support Systems: Son, Family members, Friends/neighbors  County of Residence: [place]  What city do you live in?: [place]  Home entry access options. Select all that apply.: Stairs  Number of steps to enter home.: 1  Type of Current Residence: 2 story home  Upon entering residence, is there a bedroom on the main floor (no further steps)?: Yes  Upon entering  residence, is there a bathroom on the main floor (no further steps)?: Yes  Living Arrangements: Lives w/ Son    Activities of Daily Living Prior to Admission  Functional Status: Assistance  Completes ADLs independently?: No  Level of ADL dependence: Assistance  Ambulates independently?: No  Level of ambulatory dependence: Assistance  Does patient use assisted devices?: Yes  Assisted Devices (DME) used: Shower Chair, Walker, Wheelchair  Does patient currently own DME?: Yes  What DME does the patient currently own?: Shower Chair, Walker, Wheelchair  Does the patient have a history of Short-Term Rehab?: Yes ([facility])  Does patient have a history of HHC?: Yes ([facility])  Does patient currently have HHC?: No    Patient Information Continued  Income Source: Pension/prison  Does patient have prescription coverage?: Yes  Does patient receive dialysis treatments?: No    Means of Transportation  Means of Transport to Appts:: Family transport      DISCHARGE DETAILS:    Discharge planning discussed with:: González Tipton (son)  Freedom of Choice: Yes    Comments - Freedom of Choice: SW spoke with panchito Claudio by phone and he confirmed that family is requesting hospice for patient.  SW placed referral with [facility] in [facility] and liasoni Lovell visited patient at bedside this afternoon.  Panchito Claudio came to bedside while liaison was present and is in agreement with hospice.  He consented to changing patient's status to comfort care and hospice liaison will visit again tomorrow to evaluate patient again for GIP LOC.  SW will continue to follow.      CM contacted family/caregiver?: Yes  Were Treatment Team discharge recommendations reviewed with patient/caregiver?: Yes  Did patient/caregiver verbalize understanding of patient care needs?: Yes  Were patient/caregiver advised of the risks associated with not following Treatment Team discharge recommendations?: Yes    Contacts  Patient Contacts: González  Saeed (son)  Relationship to Patient:: Family  Contact Method: Phone, In Person  Phone Number: [phone]  Reason/Outcome: Emergency Contact, Referral, Other (Comment) (hospice referral)    Requested Home Health Care         Is the patient interested in HHC at discharge?: No    DME Referral Provided  Referral made for DME?: No    Other Referral/Resources/Interventions Provided:  Interventions: Hospice    Would you like to participate in our [facility] service program?  : No - Declined    Treatment Team Recommendation: Hospice  Discharge Destination Plan:: Hospice

## 2025-03-03 NOTE — PROGRESS NOTES
Progress Note - Critical Care/ICU   Name: Yao Tipton 84 y.o. male I MRN: [id number]  Unit/Bed#: ICU 05 I Date of Admission: 2/25/2025   Date of Service: 3/3/2025 I Hospital Day: 6      Assessment & Plan  Septic shock (HCC)  Patient presents with hypotension, hypothermia. Likely secondary to septic shock in the setting of UTI. Hx of multiple UTI's. Also has a history of exudative pleural effusion in 2023. However, cannot rule out obstructive shock (hx of pericardial effusion s/p drainage in 2023) & hypovolemia at this time   LA 0.7, initial procal 0.34  UA with innumerable bacteria, WBC, negative nitrites, + large leuk. Hx of UTIs w/ proteus. Most recently in January of this year secondary to urinary retention.  CXR: vascular congestion with small bilateral pleural effusions. No obvious consolidations  CT A/P: Diffuse mild colonic wall thickening which may be due to underdistention or colitis. Evaluation is limited due to lack of oral and intravenous contrast.Urinary bladder is underdistended however appears somewhat thick walled. Correlation with urinalysis is recommended.Partially imaged bilateral pleural effusions. Chronic right pleural thickening which may be on the basis of infection or other etiologies. Bibasilar opacities, likely atelectasis.Cardiomegaly with moderate pericardial effusion.   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 Cultures grew proteus on multiple occasions. No hx of MDRO  Previously had victor in place, d/c'ed 1/8/25  CT A/P bladder wall thicking. No hydro or obstruction   Antibiotics de-escalated to  Ceftriaxone, Abx D7. Will D/C after today   Urine culture growing citrobacter  BC 1/2 growing Corynebacterium- likely contaminant  Failed urinary retention protocol, victor placed 2/27  Continue flomax   Pericardial effusion  History of pericardial effusion in June of 2023. Originally presented with concern for sepsis. Found to have a pericardial effusion and was transferred to [facility] and underwent pericardiocentesis on 6/30/23 by Dr. Adame. Unclear etiology per previous notes. Cultures negative however WBC 1,280 in pericardial fluid. Negative for malignancy  2/25: CT A/P showing moderate pericardial effusion   2/26 Echo: EF 60%, There is a moderate pericardial effusion. Glides mainly to apical area as well as right ventricular and right atrium with no evidence of tamponade. Pericardium is very thickened. Certainly we cannot rule out chronic constriction.   Eliquis currently on hold   Given rising creatinine, consider limited echo to assess for increased pericardial effusion /tamponade   Goal MAP >65    Acute kidney injury superimposed on chronic kidney disease  (HCC)  Lab Results   Component Value Date    EGFR 10 03/02/2025    EGFR 11 03/02/2025    EGFR 11 03/01/2025    CREATININE 4.65 (H) 03/02/2025    CREATININE 4.45 (H) 03/02/2025    CREATININE 4.32 (H) 03/01/2025       Creatinine 3.78 on admission  Baseline creatinine 2.2-2.5?  S/p IV resuscitation in the ED  CT A/P without hydronephrosis or evidence of obstruction   Creatinine initially improved but then vaishnavi.   Victor placed 2/27 after failing urinary retention protocol   S/p lasix 20mg 2/28 with no response.  Urine  "output down-trending, serum bicarb 18-20  Appreciate Neurology following, not a candidate for HD  Given 80mg IV lasix overnight without response   Close I&O  Continue flomax  Avoid nephrotoxins   Hypothermia  Hypothermia, likely secondary to sepsis  given 50mg solucortef in the ED, cortisol level unfortunately obtained after hydrocortisone administration   TSH 9, free T4 0.83   2/27 hydrocortisone 50mg Q6 initiated due to refractory hypothermia, hypotension and hypoglycemia   Ruben martín PRN to achieve normothermia  Thrombocytopenia (HCC)  Platelets 35K on arrival  Baseline platelets   Thrombocytopenia possibly in the setting of sepsis  Hold eliquis and DVT ppx for now  Consider resuming if platelets remain >50K   Monitor for signs/symptoms of bleeding  Continue to trend   Chronic combined systolic and diastolic CHF (congestive heart failure) (HCC)  Wt Readings from Last 3 Encounters:   03/03/25 72.1 kg (158 lb 15.2 oz)   01/14/25 69.9 kg (154 lb 1.6 oz)   01/06/25 70.3 kg (155 lb)       Most recent Echo 12/9/24: EF 45%, abnormal diastolic dysfunction, mildly reduced RV function.   Home meds: torsemide 40mg BID  Lower extremity edema present but mucus membranes appear dry, weight down 4KG from one month ago    S/p fluid resuscitation in the ED  CXR consistent with vascular congestion and small pleural effusions  Holding scheduled diuretics. S/p furosemide 20mg 2/28 with poor response. Now with rise in creatinine.   Close I&Os  Daily weights      Chronic atrial fibrillation (HCC)  Chronic afib on eliquis  Not on rate control at this time  Currently HR in the 80-90s  Hold eliquis secondary to thrombocytopenia   Diarrhea (Resolved: 3/3/2025)  Episodes of diarrhea in the ED  C diff positive, EIA negative  Enteric stool panel negative   CT \"Diffuse mild colonic wall thickening which may be due to underdistention or colitis. Evaluation is limited due to lack of oral and intravenous contrast.\"  Monitor output " "  Diabetes mellitus with peripheral artery disease (HCC)  Lab Results   Component Value Date    HGBA1C 7.7 (H) 12/07/2024       Recent Labs     03/02/25  1143 03/02/25  1647 03/02/25  2044 03/03/25  0549   POCGLU 175* 141* 161* 164*       Blood Sugar Average: Last 72 hrs:  (P) 185.9622100899168647    Hold home amaryl  Continue SSI Q6   Lantus D/C'ed 3/2  Goal BG <180  Pleural effusion  Small bilateral pleural effusions present on CXR  CT with evidence of \"Partially imaged bilateral pleural effusions. Chronic right pleural thickening which may be on the basis of infection or other etiologies. Bibasilar opacities, likely atelectasis. '  Hx of exudative effusion s/p chest tube placement and tunneled pleural drain placed in 2023, s/p removal   Cultures negative, cytology negative  Thought to be hydropneumothorax ?  Respiratory status currently stable on RA  Metabolic encephalopathy  Patient alert and oriented on arrival to the ED, goals of care discussion held with patient and ED physician  Likely metabolic encephalopathy in the setting of UTI, now worsening in setting of KARINA/uremia  Patient wakes briefly to stimuli but can no longer take PO safely secondary to encephalopathy  VBG without hypercarbia  Ammonia 29  TSH 9  Hold sedating meds   CAM ICU  Sleep hygiene   High anion gap metabolic acidosis  Serum bicarb 18-19 in setting of worsening KARINA  Appreciate Nephrology following- patient is not a candidate for HD   Continue with goal MAP >65, see plan as outlined  Trend BMP  Disposition: Med Surg, family considering hospice consult     ICU Core Measures     A: Assess, Prevent, and Manage Pain Has pain been assessed? Yes  Need for changes to pain regimen? No   B: Both SAT/SAT  N/A   C: Choice of Sedation RASS Goal: N/A patient not on sedation  Need for changes to sedation or analgesia regimen? NA   D: Delirium CAM-ICU: Unable to perform secondary to Acute cognitive dysfunction   E: Early Mobility  Plan for early mobility? " No   F: Family Engagement Plan for family engagement today? Yes       Antibiotic Review: Antibiotics to be discontinued today    Review of Invasive Devices:    Dupont Plan: Continue for accurate I/O monitoring for 48 hours        Prophylaxis:  VTE VTE covered by:    None       Stress Ulcer  covered byfamotidine (PEPCID) 10 mg tablet [[id number]] (Long-Term Med), pantoprazole (PROTONIX) injection 40 mg [[id number]]         24 Hour Events : Mental status continues to decline, not able to safely take PO yesterday secondary to encephalopathy. Goals of care reviewed with the family, they do not want to pursue dialysis and would like to consider hospice consult. Remains off of levophed.     Subjective   Review of Systems: Review of Systems not obtainable due to Altered mental status    Objective :                   Vitals I/O      Most Recent Min/Max in 24hrs   Temp 97.8 °F (36.6 °C) Temp  Min: 94.4 °F (34.7 °C)  Max: 97.8 °F (36.6 °C)   Pulse 89 Pulse  Min: 67  Max: 99   Resp 20 Resp  Min: 14  Max: 33   /81 BP  Min: 94/60  Max: 147/65   O2 Sat 97 % SpO2  Min: 96 %  Max: 99 %      Intake/Output Summary (Last 24 hours) at 3/3/2025 0644  Last data filed at 3/3/2025 0601  Gross per 24 hour   Intake 110 ml   Output 350 ml   Net -240 ml       Diet Cardiovascular; Sodium 2 GM; Dysphagia 1-Pureed; Thin Liquid    Invasive Monitoring           Physical Exam   Physical Exam  Eyes:      Pupils: Pupils are equal, round, and reactive to light.   Skin:     General: Skin is warm and dry.      Comments: Significant scattered bruising.    HENT:      Head: Normocephalic and atraumatic.   Cardiovascular:      Rate and Rhythm: Normal rate.      Heart sounds: Normal heart sounds.   Musculoskeletal:      Right lower leg: 3+ Edema present.      Left lower leg: 3+ Edema present.   Abdominal: General: Bowel sounds are normal.      Palpations: Abdomen is soft.   Constitutional:       General: He is not in acute distress.     Appearance: He is  ill-appearing.   Pulmonary:      Effort: Tachypnea present. No respiratory distress.      Breath sounds: Decreased air movement present. Examination of the right-upper field reveals decreased breath sounds. Examination of the right-middle field reveals decreased breath sounds. Examination of the left-middle field reveals decreased breath sounds. Examination of the right-lower field reveals decreased breath sounds. Examination of the left-lower field reveals decreased breath sounds. Decreased breath sounds and rales present.   Neurological:      Mental Status: He is somnolent.      Comments: Nods yes to questions. Does not open eyes. Does not follow commands but moves weakly spontaneously    Genitourinary/Anorectal:  Dupont present.        Diagnostic Studies        Lab Results: I have reviewed the following results:     Medications:  Scheduled PRN   atorvastatin, 40 mg, Daily With Dinner  cefTRIAXone, 1,000 mg, Q24H  chlorhexidine, 15 mL, Q12H RASHAD  docusate sodium, 100 mg, BID  hydrocortisone sodium succinate, 50 mg, Q6H RASHAD  insulin lispro, 2-12 Units, TID AC  insulin lispro, 2-12 Units, HS  latanoprost, 1 drop, HS  midodrine, 10 mg, TID AC  pantoprazole, 40 mg, Q24H RASHAD  tamsulosin, 0.4 mg, Daily With Dinner  timolol, 1 drop, Daily          Continuous          Labs:   CBC    Recent Labs     03/02/25  0518 03/03/25  0552   WBC 12.80* 14.10*   HGB 8.0* 8.9*   HCT 24.1* 26.7*   PLT 43* 44*     BMP    Recent Labs     03/02/25  0518 03/02/25  1756   SODIUM 136 138   K 4.4 4.6    105   CO2 19* 20*   AGAP 13 13   * 120*   CREATININE 4.45* 4.65*   CALCIUM 7.9* 8.0*       Coags    No recent results     Additional Electrolytes  Recent Labs     03/02/25  0518   MG 2.4   PHOS 5.6*          Blood Gas    No recent results  No recent results LFTs  No recent results    Infectious  Recent Labs     03/02/25  0518   PROCALCITONI 0.23     Glucose  Recent Labs     03/01/25  1336 03/01/25  2209 03/02/25  0518 03/02/25  1756    GLUC 236* 184* 171* 156*

## 2025-03-03 NOTE — ASSESSMENT & PLAN NOTE
Lab Results   Component Value Date    HGBA1C 7.7 (H) 12/07/2024       Recent Labs     03/02/25  1143 03/02/25  1647 03/02/25 2044 03/03/25  0549   POCGLU 175* 141* 161* 164*       Blood Sugar Average: Last 72 hrs:  (P) 185.5785886869689071    Hold home amaryl  Continue SSI Q6   Lantus D/C'ed 3/2  Goal BG <180

## 2025-03-03 NOTE — ASSESSMENT & PLAN NOTE
-Lab work pending this morning  - Prior lab work anion gap closed.  Bicarb is slowly improving as of yesterday

## 2025-03-03 NOTE — ASSESSMENT & PLAN NOTE
Platelets 35K on arrival  Baseline platelets   Thrombocytopenia possibly in the setting of sepsis  Hold eliquis and DVT ppx for now  Consider resuming if platelets remain >50K   Monitor for signs/symptoms of bleeding  Continue to trend

## 2025-03-03 NOTE — ASSESSMENT & PLAN NOTE
Patient alert and oriented on arrival to the ED, goals of care discussion held with patient and ED physician  Likely metabolic encephalopathy in the setting of UTI, now worsening in setting of KARINA/uremia  Patient wakes briefly to stimuli but can no longer take PO safely secondary to encephalopathy  VBG without hypercarbia  Ammonia 29  TSH 9  Hold sedating meds   CAM ICU  Sleep hygiene

## 2025-03-03 NOTE — SPEECH THERAPY NOTE
Was just informed of transition to comfort measures (signing off at this time).   Lea Godinez MS CCC-SLP   [place] License [id number]

## 2025-03-03 NOTE — CASE MANAGEMENT
Case Management Progress Note    Patient name Yao Tipton  Location ICU 05/ICU 05 MRN [id number]  : 1940 Date 3/3/2025       LOS (days): 6  Geometric Mean LOS (GMLOS) (days): 4.9  Days to GMLOS:-0.7        OBJECTIVE:        Current admission status: Inpatient  Preferred Pharmacy:   [id number] - [address] [address]  Phone: [phone] Fax: [phone]    Primary Care Provider: Nicho Baltazar DO    Primary Insurance: MEDICARE  Secondary Insurance: BLUE CROSS    PROGRESS NOTE:    Voice mails left for areli Claudio on home and cell numbers, contact information provided and call back requested.

## 2025-03-03 NOTE — ASSESSMENT & PLAN NOTE
-Lactic acid initially normal and procalcitonin 0.3  - Urinalysis with innumerable bacteria and WBC.  Has had a history of multiple UTIs in the past  - Chest x-ray with pulmonary congestion  - CT chest with colonic wall thickening with concern for colitis  - Viral panel unrevealing for COVID and influenza  - C. difficile positive but does not appear to have active infection-defer to primary team  - Blood cultures with corynebacterium and gram-positive rods  - Urine culture with Citrobacter  - Antibiotics per primary team in progress  - On hydrocortisone

## 2025-03-03 NOTE — QUICK NOTE
8/30/2019  1:53 PM - Gaye Oden Incoming Lab Results From [employer] Value Ref Range & Units Status Collected Lab   pH, Arterial 7.260Low Panic   7.350 - 7.450 Final 08/30/2019  1:50 PM [address] [place] [address] Lab   pCO2, Arterial 32. 0Low   35.0 - 45.0 mmHg Final 08/30/2019  1:50 PM [facility] Lab   pO2, Arterial 96.0  80.0 - 100.0 mmHg Final 08/30/2019  1:50 PM [facility] Lab   HCO3, Arterial 14.4Low   22.0 - 26.0 mmol/L Final 08/30/2019  1:50 PM [facility] Excess, Arterial -11. 6Low   -2.0 - 2.0 mmol/L Final 08/30/2019  1:50 PM [address] [place] [address] Lab   Hemoglobin, Art, Extended 10.2Low   12.0 - 16.0 g/dL Final 08/30/2019  1:50 PM [address] [place] [address] Lab   O2 Sat, Arterial 96.4  >92 % Final 08/30/2019  1:50 PM [address] [place] [address] Lab   Carboxyhgb, Arterial 2.2  0.0 - 5.0 % Final 08/30/2019  1:50 PM [facility] Lab        0.0-1.5   (Smokers 1.5-5.0)    Methemoglobin, Arterial 0.8  <1.5 % Final 08/30/2019  1:50 PM [address] [place] [address] Lab   O2 Content, Arterial 14.0  Not Established mL/dL Final 08/30/2019  1:50 PM [address] [place] [address] Lab   O2 Therapy Unknown   Final 08/30/2019  1:50 PM [address] [place] [address] Lab   Testing Performed By     [address]Eliu Gerber Name Director Address Valid Date Range   [id number] - [address] LAB Manuela Duffy  [facility],[address]  [address] [place] [address] 08/30/17 0733-Present   Narrative   Performed by: Silvino Quiroga 5 tel. [phone],  Noris Thao rn, 08/30/2019 13:53, by      Pt on room air, rr, AT+ I updated the patient's son González at bedside.  I confirmed with González his wishes to transition patient to comfort care and pursue hospice.  I explained the process of comfort care and hospice.  All questions were answered.

## 2025-03-03 NOTE — ASSESSMENT & PLAN NOTE
Wt Readings from Last 3 Encounters:   03/03/25 72.1 kg (158 lb 15.2 oz)   01/14/25 69.9 kg (154 lb 1.6 oz)   01/06/25 70.3 kg (155 lb)       Most recent Echo 12/9/24: EF 45%, abnormal diastolic dysfunction, mildly reduced RV function.   Home meds: torsemide 40mg BID  Lower extremity edema present but mucus membranes appear dry, weight down 4KG from one month ago    S/p fluid resuscitation in the ED  CXR consistent with vascular congestion and small pleural effusions  Holding scheduled diuretics. S/p furosemide 20mg 2/28 with poor response. Now with rise in creatinine.   Close I&Os  Daily weights

## 2025-03-03 NOTE — ASSESSMENT & PLAN NOTE
History of pericardial effusion in June of 2023. Originally presented with concern for sepsis. Found to have a pericardial effusion and was transferred to [facility] and underwent pericardiocentesis on 6/30/23 by Dr. Adame. Unclear etiology per previous notes. Cultures negative however WBC 1,280 in pericardial fluid. Negative for malignancy  2/25: CT A/P showing moderate pericardial effusion   2/26 Echo: EF 60%, There is a moderate pericardial effusion. Glides mainly to apical area as well as right ventricular and right atrium with no evidence of tamponade. Pericardium is very thickened. Certainly we cannot rule out chronic constriction.   Eliquis currently on hold   Given rising creatinine, consider limited echo to assess for increased pericardial effusion /tamponade   Goal MAP >65

## 2025-03-03 NOTE — PROGRESS NOTES
Intended visit with pt who is resting comfortably at this time. Interfaith blessing offered outside of pt room. Available to follow upon request.     Thank you!      03/03/25 1300   Clinical Encounter Type   Visited With Patient   Routine Visit Introduction

## 2025-03-04 PROBLEM — Z51.5 COMFORT MEASURES ONLY STATUS: Status: ACTIVE | Noted: 2025-01-01

## 2025-03-04 NOTE — PROGRESS NOTES
"Intended f/u visit with pt \"Yao\" who is resting comfortably. Offered prayer at bedside for comfort and peace as pt has been changed to level 4 status.     Thank you!        03/04/25 1100   Clinical Encounter Type   Visited With Patient   Routine Visit Follow-up       "

## 2025-03-04 NOTE — ASSESSMENT & PLAN NOTE
While in the ICU, ICU team spoke with patient's son and patient transition to comfort care  Continue IV Dilaudid, IV Ativan, IV Haldol, Robinul as needed  Patient seen by hospice liaison and at this time does not qualify for inpatient hospice

## 2025-03-04 NOTE — ASSESSMENT & PLAN NOTE
Patient initially presented with hypotension, hypothermia in the setting of UTI  Initially in the ICU.  Urine culture with Citrobacter  Was on Levophed which was discontinued on 3/2, midodrine, also started on Solu-Cortef given refractory hypotension, hypoglycemia and hypothermia  Was on Rocephin

## 2025-03-04 NOTE — PLAN OF CARE
Problem: Potential for Falls  Goal: Patient will remain free of falls  Description: INTERVENTIONS:  - Educate patient/family on patient safety including physical limitations  - Instruct patient to call for assistance with activity   - Consult OT/PT to assist with strengthening/mobility   - Keep Call bell within reach  - Keep bed low and locked with side rails adjusted as appropriate  - Keep care items and personal belongings within reach  - Initiate and maintain comfort rounds  - Make Fall Risk Sign visible to staff  - Offer Toileting every 2 Hours, in advance of need  - Initiate/Maintain bed/chair alarm  - Obtain necessary fall risk management equipment  - Apply yellow socks and bracelet for high fall risk patients  - Consider moving patient to room near nurses station  Outcome: Progressing     Problem: PAIN - ADULT  Goal: Verbalizes/displays adequate comfort level or baseline comfort level  Description: Interventions:  - Encourage patient to monitor pain and request assistance  - Assess pain using appropriate pain scale  - Administer analgesics based on type and severity of pain and evaluate response  - Implement non-pharmacological measures as appropriate and evaluate response  - Consider cultural and social influences on pain and pain management  - Notify physician/advanced practitioner if interventions unsuccessful or patient reports new pain  Outcome: Progressing     Problem: INFECTION - ADULT  Goal: Absence or prevention of progression during hospitalization  Description: INTERVENTIONS:  - Assess and monitor for signs and symptoms of infection  - Monitor lab/diagnostic results  - Monitor all insertion sites, i.e. indwelling lines, tubes, and drains  - Monitor endotracheal if appropriate and nasal secretions for changes in amount and color  - [place] appropriate cooling/warming therapies per order  - Administer medications as ordered  - Instruct and encourage patient and family to use good hand hygiene  technique  - Identify and instruct in appropriate isolation precautions for identified infection/condition  Outcome: Progressing     Problem: DISCHARGE PLANNING  Goal: Discharge to home or other facility with appropriate resources  Description: INTERVENTIONS:  - Identify barriers to discharge w/patient and caregiver  - Arrange for needed discharge resources and transportation as appropriate  - Identify discharge learning needs (meds, wound care, etc.)  - Arrange for interpretive services to assist at discharge as needed  - Refer to Case Management Department for coordinating discharge planning if the patient needs post-hospital services based on physician/advanced practitioner order or complex needs related to functional status, cognitive ability, or social support system  Outcome: Progressing     Problem: Knowledge Deficit  Goal: Patient/family/caregiver demonstrates understanding of disease process, treatment plan, medications, and discharge instructions  Description: Complete learning assessment and assess knowledge base.  Interventions:  - Provide teaching at level of understanding  - Provide teaching via preferred learning methods  Outcome: Progressing     Problem: NEUROSENSORY - ADULT  Goal: Achieves stable or improved neurological status  Description: INTERVENTIONS  - Monitor and report changes in neurological status  - Monitor vital signs such as temperature, blood pressure, glucose, and any other labs ordered   - Initiate measures to prevent increased intracranial pressure  - Monitor for seizure activity and implement precautions if appropriate      Outcome: Progressing     Problem: CARDIOVASCULAR - ADULT  Goal: Maintains optimal cardiac output and hemodynamic stability  Description: INTERVENTIONS:  - Monitor I/O, vital signs and rhythm  - Monitor for S/S and trends of decreased cardiac output  - Administer and titrate ordered vasoactive medications to optimize hemodynamic stability  - Assess quality of  pulses, skin color and temperature  - Assess for signs of decreased coronary artery perfusion  - Instruct patient to report change in severity of symptoms  Outcome: Progressing  Goal: Absence of cardiac dysrhythmias or at baseline rhythm  Description: INTERVENTIONS:  - Continuous cardiac monitoring, vital signs, obtain 12 lead EKG if ordered  - Administer antiarrhythmic and heart rate control medications as ordered  - Monitor electrolytes and administer replacement therapy as ordered  Outcome: Progressing     Problem: GASTROINTESTINAL - ADULT  Goal: Minimal or absence of nausea and/or vomiting  Description: INTERVENTIONS:  - Administer IV fluids if ordered to ensure adequate hydration  - Maintain NPO status until nausea and vomiting are resolved  - Nasogastric tube if ordered  - Administer ordered antiemetic medications as needed  - Provide nonpharmacologic comfort measures as appropriate  - Advance diet as tolerated, if ordered  - Consider nutrition services referral to assist patient with adequate nutrition and appropriate food choices  Outcome: Progressing  Goal: Maintains or returns to baseline bowel function  Description: INTERVENTIONS:  - Assess bowel function  - Encourage oral fluids to ensure adequate hydration  - Administer IV fluids if ordered to ensure adequate hydration  - Administer ordered medications as needed  - Encourage mobilization and activity  - Consider nutritional services referral to assist patient with adequate nutrition and appropriate food choices  Outcome: Progressing  Goal: Maintains adequate nutritional intake  Description: INTERVENTIONS:  - Monitor percentage of each meal consumed  - Identify factors contributing to decreased intake, treat as appropriate  - Assist with meals as needed  - Monitor I&O, weight, and lab values if indicated  - Obtain nutrition services referral as needed  Outcome: Progressing     Problem: GENITOURINARY - ADULT  Goal: Maintains or returns to baseline urinary  function  Description: INTERVENTIONS:  - Assess urinary function  - Encourage oral fluids to ensure adequate hydration if ordered  - Administer IV fluids as ordered to ensure adequate hydration  - Administer ordered medications as needed  - Offer frequent toileting  - Follow urinary retention protocol if ordered  Outcome: Progressing  Goal: Absence of urinary retention  Description: INTERVENTIONS:  - Assess patient’s ability to void and empty bladder  - Monitor I/O  - Bladder scan as needed  - Discuss with physician/AP medications to alleviate retention as needed  - Discuss catheterization for long term situations as appropriate  Outcome: Progressing     Problem: METABOLIC, FLUID AND ELECTROLYTES - ADULT  Goal: Electrolytes maintained within normal limits  Description: INTERVENTIONS:  - Monitor labs and assess patient for signs and symptoms of electrolyte imbalances  - Administer electrolyte replacement as ordered  - Monitor response to electrolyte replacements, including repeat lab results as appropriate  - Instruct patient on fluid and nutrition as appropriate  Outcome: Progressing  Goal: Fluid balance maintained  Description: INTERVENTIONS:  - Monitor labs   - Monitor I/O and WT  - Instruct patient on fluid and nutrition as appropriate  - Assess for signs & symptoms of volume excess or deficit  Outcome: Progressing     Problem: SKIN/TISSUE INTEGRITY - ADULT  Goal: Incision(s), wounds(s) or drain site(s) healing without S/S of infection  Description: INTERVENTIONS  - Assess and document dressing, incision, wound bed, drain sites and surrounding tissue  - Provide patient and family education  - Perform skin care/dressing changes every shift  Outcome: Progressing  Goal: Pressure injury heals and does not worsen  Description: Interventions:  - Implement low air loss mattress or specialty surface (Criteria met)  - Apply silicone foam dressing  - Instruct/assist with weight shifting every 90 minutes when in chair   -  Limit chair time to 4 hour intervals  - Use special pressure reducing interventions such as EHOB when in chair   - Apply fecal or urinary incontinence containment device   - Perform passive or active ROM every 2 hours  - Turn and reposition patient & offload bony prominences every 2 hours   - Utilize friction reducing device or surface for transfers   - Consider consults to  interdisciplinary teams   - Use incontinent care products after each incontinent episode   - Consider nutrition services referral as needed  Outcome: Progressing     Problem: Nutrition/Hydration-ADULT  Goal: Nutrient/Hydration intake appropriate for improving, restoring or maintaining nutritional needs  Description: Monitor and assess patient's nutrition/hydration status for malnutrition. Collaborate with interdisciplinary team and initiate plan and interventions as ordered.  Monitor patient's weight and dietary intake as ordered or per policy. Utilize nutrition screening tool and intervene as necessary. Determine patient's food preferences and provide high-protein, high-caloric foods as appropriate.     INTERVENTIONS:  - Monitor oral intake, urinary output, labs, and treatment plans  - Assess nutrition and hydration status and recommend course of action  - Evaluate amount of meals eaten  - Assist patient with eating if necessary   - Allow adequate time for meals  - Recommend/ encourage appropriate diets, oral nutritional supplements, and vitamin/mineral supplements  - Order, calculate, and assess calorie counts as needed  - Recommend, monitor, and adjust tube feedings and TPN/PPN based on assessed needs  - Assess need for intravenous fluids  - Provide specific nutrition/hydration education as appropriate  - Include patient/family/caregiver in decisions related to nutrition  Outcome: Progressing     Problem: Prexisting or High Potential for Compromised Skin Integrity  Goal: Skin integrity is maintained or improved  Description: INTERVENTIONS:  -  Identify patients at risk for skin breakdown  - Assess and monitor skin integrity  - Assess and monitor nutrition and hydration status  - Monitor labs   - Assess for incontinence   - Turn and reposition patient  - Assist with mobility/ambulation  - Relieve pressure over bony prominences  - Avoid friction and shearing  - Provide appropriate hygiene as needed including keeping skin clean and dry  - Evaluate need for skin moisturizer/barrier cream  - Collaborate with interdisciplinary team   - Patient/family teaching  - Consider wound care consult   Outcome: Progressing

## 2025-03-04 NOTE — ASSESSMENT & PLAN NOTE
Lab Results   Component Value Date    HGBA1C 7.7 (H) 12/07/2024       Recent Labs     03/02/25  1647 03/02/25  2044 03/03/25  0549 03/03/25  1115   POCGLU 141* 161* 164* 154*     Currently on comfort care

## 2025-03-04 NOTE — CASE MANAGEMENT
Case Management Progress Note    Patient name Yao Tipton  Location [address] [place] [address] [place] [address]-* MRN [id number]  : 1940 Date 3/4/2025       LOS (days): 7  Geometric Mean LOS (GMLOS) (days): 4.9  Days to GMLOS:-1.8        OBJECTIVE:        Current admission status: Inpatient  [facility]:   [id number] [address] [address]  Phone: [phone] Fax: [phone]    Primary Care Provider: Nicho Baltazar DO    Primary Insurance: MEDICARE  Secondary Insurance: BLUE CROSS    PROGRESS NOTE:    Bronwyn from [facility] came bedside to evaluate patient for GIP. At present time patient is awake and eating lunch fed by staff. Bronwyn states patient currently does not meet but will re-evaluate tomorrow. Attending and unit nurse aware.

## 2025-03-04 NOTE — PROGRESS NOTES
Progress Note - Hospitalist   Name: Yao Tipton 84 y.o. male I MRN: [id number]  Unit/Bed#: [address] [place] [address] Date of Admission: 2/25/2025   Date of Service: 3/4/2025 I Hospital Day: 7    Assessment & Plan  Comfort measures only status  While in the ICU, ICU team spoke with patient's son and patient transition to comfort care  Continue IV Dilaudid, IV Ativan, IV Haldol, Robinul as needed  Patient seen by hospice liaison and at this time does not qualify for inpatient hospice  Septic shock (HCC)  Patient initially presented with hypotension, hypothermia in the setting of UTI  Initially in the ICU.  Urine culture with Citrobacter  Was on Levophed which was discontinued on 3/2, midodrine, also started on Solu-Cortef given refractory hypotension, hypoglycemia and hypothermia  Was on Rocephin  Acute kidney injury superimposed on chronic kidney disease  ([facility])  Lab Results   Component Value Date    EGFR 9 03/03/2025    EGFR 10 03/02/2025    EGFR 11 03/02/2025    CREATININE 4.92 (H) 03/03/2025    CREATININE 4.65 (H) 03/02/2025    CREATININE 4.45 (H) 03/02/2025   Creatinine 3.78 on admission, continued to worsen  Seen by nephrology.  Baseline creatinine 2.5-3  CT scan negative for hydronephrosis  Pericardial effusion  Patient with history of pericardial effusion and status post pericardiocentesis on 6/30/2023  Thrombocytopenia ([facility])  Platelets of 35K on arrival, history of thrombocytopenia  Last platelet count 44  Diabetes mellitus with peripheral artery disease ([facility])  Lab Results   Component Value Date    HGBA1C 7.7 (H) 12/07/2024       Recent Labs     03/02/25  1647 03/02/25  2044 03/03/25  0549 03/03/25  1115   POCGLU 141* 161* 164* 154*     Currently on comfort care  UTI (urinary tract infection)  Was on IV Rocephin while in the ICU.  Urine culture with Citrobacter  Chronic atrial fibrillation (HCC)  Eliquis was held in the setting of thrombocytopenia while in the ICU  Hypothermia  Hypothermia likely due to sepsis.   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  Moderate pericardial effusion noted  Received Lasix previously while in the ICU with poor response  High anion gap metabolic acidosis  Bicarb was slowly improving.  Nephrology was involved    VTE Pharmacologic Prophylaxis:    None    Mobility:   Basic Mobility Inpatient Raw Score: 6  JH-HLM Goal: 2: Bed activities/Dependent transfer  JH-HLM Achieved: 2: Bed activities/Dependent transfer  JH-HLM Goal achieved. Continue to encourage appropriate mobility.    Patient Centered Rounds: I performed bedside rounds with nursing staff today.   Discussions with Specialists or Other Care Team Provider: Yes-case management    Education and Discussions with Family / Patient: Attempted to update  (son) via phone. Unable to contact.    Current Length of Stay: 7 day(s)  Current Patient Status: Inpatient   Certification Statement: The patient will continue to require additional inpatient hospital stay due to comfort care measures  Discharge Plan:  Pending    Code Status: Level 4 - Comfort Care    Subjective   Patient seen earlier today.  Appears lethargic    Objective :  Temp:  [97.5 °F (36.4 °C)-97.6 °F (36.4 °C)] 97.6 °F (36.4 °C)  HR:  [68-83] 68  BP: (74-81)/(37-49) 75/49  Resp:  [9-20] 11  SpO2:  [84 %-93 %] 90 %  O2 Device: Nasal cannula  Nasal Cannula O2 Flow Rate (L/min):  [2 L/min] 2  L/min    Body mass index is 24.17 kg/m².     Input and Output Summary (last 24 hours):     Intake/Output Summary (Last 24 hours) at 3/4/2025 1616  Last data filed at 3/4/2025 1100  Gross per 24 hour   Intake --   Output 50 ml   Net -50 ml       Physical Exam  Vitals reviewed.   Constitutional:       General: He is not in acute distress.     Appearance: He is ill-appearing.      Comments: lethargic   HENT:      Head: Normocephalic and atraumatic.   Cardiovascular:      Rate and Rhythm: Normal rate. Rhythm irregular.   Pulmonary:      Effort: No respiratory distress.      Breath sounds: No wheezing or rales.      Comments: Decreased breath sounds bilaterally although patient with decreased inspiratory effort  Abdominal:      General: Bowel sounds are normal. There is no distension.      Palpations: Abdomen is soft.      Tenderness: There is no abdominal tenderness.           Lines/Drains:  Lines/Drains/Airways       Active Status       Name Placement date Placement time Site Days    Urethral Catheter 16 Fr. 02/27/25  1130  --  5                  Urinary Catheter:  Goal for removal: N/A- Discharging with [place]                 Lab Results: I have reviewed the following results:   Results from last 7 days   Lab Units 03/03/25  0552 03/02/25  0518 03/01/25  0531   WBC Thousand/uL 14.10*   < > 12.64*   HEMOGLOBIN g/dL 8.9*   < > 8.7*   HEMATOCRIT % 26.7*   < > 27.2*   PLATELETS Thousands/uL 44*   < > 54*   SEGS PCT %  --   --  91*   LYMPHO PCT %  --   --  4*   MONO PCT %  --   --  4   EOS PCT %  --   --  0    < > = values in this interval not displayed.     Results from last 7 days   Lab Units 03/03/25  0552 02/28/25  0605 02/27/25  0514   SODIUM mmol/L 136   < > 137   POTASSIUM mmol/L 5.0   < > 3.9   CHLORIDE mmol/L 104   < > 104   CO2 mmol/L 18*   < > 20*   BUN mg/dL 123*   < > 104*   CREATININE mg/dL 4.92*   < > 3.76*   ANION GAP mmol/L 14*   < > 13   CALCIUM mg/dL 8.1*   < > 7.5*   ALBUMIN g/dL  --   --  2.3*   TOTAL  BILIRUBIN mg/dL  --   --  0.49   ALK PHOS U/L  --   --  104   ALT U/L  --   --  18   AST U/L  --   --  16   GLUCOSE RANDOM mg/dL 163*   < > 68    < > = values in this interval not displayed.     Results from last 7 days   Lab Units 02/28/25  0605   INR  1.47*     Results from last 7 days   Lab Units 03/03/25  1115 03/03/25  0549 03/02/25  2044 03/02/25  1647 03/02/25  1143 03/02/25  0736 03/01/25  2130 03/01/25  1624 03/01/25  1101 03/01/25  0729 03/01/25  0004 02/28/25  2033   POC GLUCOSE mg/dl 154* 164* 161* 141* 175* 172* 163* 216* 228* 239* 243* 200*         Results from last 7 days   Lab Units 03/02/25  0518 03/01/25  0531 02/26/25  0519 02/25/25  1732   LACTIC ACID mmol/L  --   --   --  0.8   PROCALCITONIN ng/ml 0.23 0.31* 0.29* 0.34*       Recent Cultures (last 7 days):   Results from last 7 days   Lab Units 02/25/25  1942 02/25/25  1732 02/25/25  1726   BLOOD CULTURE   --  No Growth After 5 Days. Corynebacterium imitans*   GRAM STAIN RESULT   --   --  Gram positive rods*   URINE CULTURE  >100,000 cfu/ml Citrobacter freundii*  --   --    C DIFF TOXIN B BY PCR  Positive*  --   --        Imaging Results Review: I reviewed radiology reports from this admission including: CT abdomen/pelvis.  Other Study Results Review: No additional pertinent studies reviewed.    Last 24 Hours Medication List:     Current Facility-Administered Medications:     glycopyrrolate (ROBINUL) injection 0.1 mg, Q4H PRN    haloperidol lactate (HALDOL) injection 0.5 mg, Q2H PRN    HYDROmorphone (DILAUDID) injection 0.3 mg, Q2H PRN    LORazepam (ATIVAN) injection 1 mg, Q10 Min PRN    Administrative Statements   Today, Patient Was Seen By: Phoebe Perez DO      **Please Note: This note may have been constructed using a voice recognition system.**

## 2025-03-04 NOTE — ASSESSMENT & PLAN NOTE
Likely metabolic encephalopathy in the setting of sepsis, UTI and worsening in the setting of KARINA/uremia  TSH 9, ammonia 29

## 2025-03-04 NOTE — PLAN OF CARE
Problem: Potential for Falls  Goal: Patient will remain free of falls  Description: INTERVENTIONS:  - Educate patient/family on patient safety including physical limitations  - Instruct patient to call for assistance with activity   - Consult OT/PT to assist with strengthening/mobility   - Keep Call bell within reach  - Keep bed low and locked with side rails adjusted as appropriate  - Keep care items and personal belongings within reach  - Initiate and maintain comfort rounds  - Make Fall Risk Sign visible to staff  - Offer Toileting every 2 Hours, in advance of need  - Initiate/Maintain bed/chair alarm  - Obtain necessary fall risk management equipment  - Apply yellow socks and bracelet for high fall risk patients  - Consider moving patient to room near nurses station  Outcome: Progressing     Problem: PAIN - ADULT  Goal: Verbalizes/displays adequate comfort level or baseline comfort level  Description: Interventions:  - Encourage patient to monitor pain and request assistance  - Assess pain using appropriate pain scale  - Administer analgesics based on type and severity of pain and evaluate response  - Implement non-pharmacological measures as appropriate and evaluate response  - Consider cultural and social influences on pain and pain management  - Notify physician/advanced practitioner if interventions unsuccessful or patient reports new pain  Outcome: Progressing     Problem: INFECTION - ADULT  Goal: Absence or prevention of progression during hospitalization  Description: INTERVENTIONS:  - Assess and monitor for signs and symptoms of infection  - Monitor lab/diagnostic results  - Monitor all insertion sites, i.e. indwelling lines, tubes, and drains  - Monitor endotracheal if appropriate and nasal secretions for changes in amount and color  - [place] appropriate cooling/warming therapies per order  - Administer medications as ordered  - Instruct and encourage patient and family to use good hand hygiene  technique  - Identify and instruct in appropriate isolation precautions for identified infection/condition  Outcome: Progressing     Problem: NEUROSENSORY - ADULT  Goal: Achieves stable or improved neurological status  Description: INTERVENTIONS  - Monitor and report changes in neurological status  - Monitor vital signs such as temperature, blood pressure, glucose, and any other labs ordered   - Initiate measures to prevent increased intracranial pressure  - Monitor for seizure activity and implement precautions if appropriate      Outcome: Progressing

## 2025-03-04 NOTE — ASSESSMENT & PLAN NOTE
Wt Readings from Last 3 Encounters:   03/03/25 72.1 kg (158 lb 15.2 oz)   01/14/25 69.9 kg (154 lb 1.6 oz)   01/06/25 70.3 kg (155 lb)     Echo from December 2024 showed EF of 45% with abnormal diastolic function  Echo from this admission shows EF of 60% and and unable to assess diastolic function due to A-fib.  Moderate pericardial effusion noted  Received Lasix previously while in the ICU with poor response

## 2025-03-04 NOTE — ASSESSMENT & PLAN NOTE
Lab Results   Component Value Date    EGFR 9 03/03/2025    EGFR 10 03/02/2025    EGFR 11 03/02/2025    CREATININE 4.92 (H) 03/03/2025    CREATININE 4.65 (H) 03/02/2025    CREATININE 4.45 (H) 03/02/2025   Creatinine 3.78 on admission, continued to worsen  Seen by nephrology.  Baseline creatinine 2.5-3  CT scan negative for hydronephrosis

## 2025-03-05 PROBLEM — Z51.5 HOSPICE CARE: Status: ACTIVE | Noted: 2025-01-01

## 2025-03-05 NOTE — H&P
H&P - Hospitalist   Name: Yao Tipton 84 y.o. male I MRN: [id number]  Unit/Bed#: [address] [address] I Date of Admission: 3/5/2025   Date of Service: 3/5/2025 I Hospital Day: 0     Assessment & Plan  Hospice care  Patient is transition to inpatient hospice care.  Started on morphine infusion with breakthrough morphine 2 mg every 2 hours as needed as well as Ativan IV as needed  Comfort measures only status  Patient's son is aware of transitioning to hospice care.        Code Status: Prior comfort care  Discussion with family: Updated  (son) at bedside.    Anticipated Length of Stay: Patient will be admitted on an inpatient basis with an anticipated length of stay of greater than 2 midnights secondary to hospice care .    History of Present Illness       Yao Tipton is a 84 y.o. male with a PMH of heart failure with an ejection fraction of 45%, atrial fibrillation on Eliquis, thrombocytopenia, chronic kidney disease and hyperlipidemia who presented from his care facility with decreased appetite, nausea and vomiting.  He was found to be hypothermic and hypotensive and septic.  Despite resuscitative efforts and treatment for his sepsis, he continued to decline.  His family opted to pursue comfort care and he has admitted to the third floor for inpatient hospice care.    Review of Systems   Reason unable to perform ROS: Declining medical status.       Historical Information   Past Medical History:   Diagnosis Date    Atrial fibrillation (HCC)     BPH (benign prostatic hyperplasia)     CHF (congestive heart failure) (HCC)     Chronic kidney disease     Coronary artery disease     Diabetes mellitus (HCC)     Hyperlipidemia     Hypertension     Hyponatremia 12/07/2024     Past Surgical History:   Procedure Laterality Date    CARDIAC CATHETERIZATION N/A 6/30/2023    Procedure: Cardiac pericardiocentesis;  Surgeon: Shanna Adame DO;  Location: [facility];  Service: Cardiology    CARDIAC  CATHETERIZATION N/A 6/30/2023    Procedure: Cardiac RHC;  Surgeon: Shanna Adame DO;  Location: [facility];  Service: Cardiology    EYE SURGERY      IR CHEST TUBE PLACEMENT  6/24/2023    IR CHEST TUBE PLACEMENT  7/28/2023    IR PLEURAL EFFUSION LONG-TERM CATHETER PLACEMENT  8/7/2023    IR PLEURAL EFFUSION LONG-TERM CATHETER REMOVAL  1/3/2024    IR THORACENTESIS  12/11/2023    SKIN CANCER EXCISION      TONSILLECTOMY       Social History     Tobacco Use    Smoking status: Never    Smokeless tobacco: Former    Tobacco comments:     Smoked a pipe 50 years ago   Vaping Use    Vaping status: Never Used   Substance and Sexual Activity    Alcohol use: Not Currently     Alcohol/week: 0.0 standard drinks of alcohol     Comment: special occasions    Drug use: Not Currently    Sexual activity: Not on file     E-Cigarette/Vaping    E-Cigarette Use Never User      E-Cigarette/Vaping Substances    Nicotine No     THC No     CBD No     Flavoring No     Other No     Unknown No        Social History:  Marital Status:    Occupation: retired   Patient Pre-hospital Living Situation: Long Term Care Facility  Patient Pre-hospital Level of Mobility: unable to be assessed at time of evaluation  Patient Pre-hospital Diet Restrictions:       Meds/Allergies   I have reviewed home medications using recent Epic encounter.  Prior to Admission medications    Medication Sig Start Date End Date Taking? Authorizing Provider   allopurinol (ZYLOPRIM) 100 mg tablet Take 100 mg by mouth 2 (two) times a day    Historical Provider, MD   ALPRAZolam (XANAX) 0.5 mg tablet 0.5 mg daily at bedtime as needed for anxiety or sleep 3/12/18   Historical Provider, MD   apixaban (Eliquis) 2.5 mg Take 1 tablet (2.5 mg total) by mouth 2 (two) times a day 8/8/24   SUSANA Kaur   ascorbic acid (VITAMIN C) 500 MG tablet Take 1 tablet (500 mg total) by mouth daily 1/8/21   Anna Dahl MD   atorvastatin (LIPITOR) 40 mg tablet Take 1 tablet  (40 mg total) by mouth daily with dinner 1/16/19   Phoebe Perez,    Blood Pressure Monitor NILTON by Does not apply route daily 2/19/20   Fredi Guadarrama MD   cholecalciferol (VITAMIN D3) 1,000 units tablet Take 2 tablets (2,000 Units total) by mouth daily Do not start before July 12, 2023.  Patient taking differently: Take 1,000 Units by mouth daily 7/12/23 2/25/25  Yousuf Pressley MD   docusate sodium (COLACE) 100 mg capsule Take 1 capsule (100 mg total) by mouth 2 (two) times a day as needed for constipation 8/10/23   Katrina Lobo PA-C   famotidine (PEPCID) 10 mg tablet Take 1 tablet (10 mg total) by mouth daily at bedtime 12/15/24   Artemio Pacheco DO   ferrous sulfate 325 (65 Fe) mg tablet Take 325 mg by mouth daily with breakfast    Historical Provider, MD   glimepiride (AMARYL) 4 mg tablet Take 4 mg by mouth daily with dinner 1/4/25   Historical Provider, MD   halobetasol (ULTRAVATE) 0.05 % cream  12/7/23   Historical Provider, MD   insulin aspart (NovoLOG FlexPen) 100 UNIT/ML injection pen  5/20/24   Historical Provider, MD   latanoprost (XALATAN) 0.005 % ophthalmic solution Administer 1 drop to both eyes daily at bedtime    Historical Provider, MD   Sure Comfort Pen Needles 32G X 4 MM MISC  5/30/24   Historical Provider, MD   tamsulosin (FLOMAX) 0.4 mg Take 0.4 mg by mouth daily with dinner    Historical Provider, MD   timolol (TIMOPTIC) 0.5 % ophthalmic solution Administer 1 drop to both eyes daily 1/7/21   Anna Dahl MD   torsemide (DEMADEX) 20 mg tablet Take 2 tablets (40 mg total) by mouth 2 (two) times a day 12/15/24   Artemio Pacheco DO   ZINC OXIDE PO Take by mouth daily    Historical Provider, MD     Allergies   Allergen Reactions    Elemental Sulfur     Sulfa Antibiotics        Objective :  Temp:  [97.1 °F (36.2 °C)-97.3 °F (36.3 °C)] 97.1 °F (36.2 °C)  HR:  [55-60] 60  BP: (77-85)/(50-51) 85/50  Resp:  [10-12] 10  SpO2:  [90 %-91 %] 90 %  O2 Device: Nasal cannula  Nasal  Cannula O2 Flow Rate (L/min):  [2 L/min] 2 L/min    Physical Exam  Constitutional:       General: He is not in acute distress.     Appearance: He is ill-appearing.   Cardiovascular:      Rate and Rhythm: Normal rate.      Pulses: Normal pulses.      Heart sounds: Normal heart sounds.      Comments: Swelling of arms and legs noted  Pulmonary:      Effort: Pulmonary effort is normal.      Comments: Labored breathing at times  Skin:     Coloration: Skin is pale.      Findings: Bruising present.   Psychiatric:         Mood and Affect: Mood normal.          Lines/Drains:    Urinary Catheter:  Goal for removal: N/A - Chronic Dupont               Lab Results: I have reviewed the following results:  Results from last 7 days   Lab Units 03/03/25  0552 03/02/25  0518 03/01/25  0531   WBC Thousand/uL 14.10*   < > 12.64*   HEMOGLOBIN g/dL 8.9*   < > 8.7*   HEMATOCRIT % 26.7*   < > 27.2*   PLATELETS Thousands/uL 44*   < > 54*   SEGS PCT %  --   --  91*   LYMPHO PCT %  --   --  4*   MONO PCT %  --   --  4   EOS PCT %  --   --  0    < > = values in this interval not displayed.     Results from last 7 days   Lab Units 03/03/25  0552 02/28/25  0605 02/27/25  0514   SODIUM mmol/L 136   < > 137   POTASSIUM mmol/L 5.0   < > 3.9   CHLORIDE mmol/L 104   < > 104   CO2 mmol/L 18*   < > 20*   BUN mg/dL 123*   < > 104*   CREATININE mg/dL 4.92*   < > 3.76*   ANION GAP mmol/L 14*   < > 13   CALCIUM mg/dL 8.1*   < > 7.5*   ALBUMIN g/dL  --   --  2.3*   TOTAL BILIRUBIN mg/dL  --   --  0.49   ALK PHOS U/L  --   --  104   ALT U/L  --   --  18   AST U/L  --   --  16   GLUCOSE RANDOM mg/dL 163*   < > 68    < > = values in this interval not displayed.     Results from last 7 days   Lab Units 02/28/25  0605   INR  1.47*     Results from last 7 days   Lab Units 03/03/25  1115 03/03/25  0549 03/02/25  2044 03/02/25  1647 03/02/25  1143 03/02/25  0736 03/01/25  2130 03/01/25  1624 03/01/25  1101 03/01/25  0729 03/01/25  0004 02/28/25 2033   POC GLUCOSE  mg/dl 154* 164* 161* 141* 175* 172* 163* 216* 228* 239* 243* 200*     Lab Results   Component Value Date    HGBA1C 7.7 (H) 12/07/2024    HGBA1C 7.2 (H) 08/19/2024    HGBA1C 8.8 (H) 11/20/2023     Results from last 7 days   Lab Units 03/02/25  0518 03/01/25  0531   PROCALCITONIN ng/ml 0.23 0.31*       Imaging Results Review: No pertinent imaging studies reviewed.  Other Study Results Review: No additional pertinent studies reviewed.    Administrative Statements       ** Please Note: This note has been constructed using a voice recognition system. **

## 2025-03-05 NOTE — ASSESSMENT & PLAN NOTE
Lab Results   Component Value Date    EGFR 9 03/03/2025    EGFR 10 03/02/2025    EGFR 11 03/02/2025    CREATININE 4.92 (H) 03/03/2025    CREATININE 4.65 (H) 03/02/2025    CREATININE 4.45 (H) 03/02/2025   Creatinine 3.78 on admission, continued to worsen  Seen by nephrology.  Baseline creatinine 2.5-3  CT scan negative for hydronephrosis  Stopping all labs and focusing on comfort

## 2025-03-05 NOTE — WOUND OSTOMY CARE
Progress Note - Wound   Yao Tipton 84 y.o. male MRN: [id number]  Unit/Bed#: [address] [place] [address] Encounter: [id number]            Patient has transitioned to comfort care and as per Shahnaz MEJIA, no wound care is to be done.      Wound care signing off, please call or text with questions and concerns.      Kandace Tello MSN, RN, CWON

## 2025-03-05 NOTE — DISCHARGE SUMMARY
Discharge Summary - Hospitalist   Name: Yao Tipton 84 y.o. male I MRN: [id number]  Unit/Bed#: [address] [place] [address] I Date of Admission: 2/25/2025   Date of Service: 3/5/2025 I Hospital Day: 8     Assessment & Plan  Comfort measures only status  While in the ICU, ICU team spoke with patient's son and patient transition to comfort care  Continue IV Dilaudid, IV Ativan, IV Haldol, Robinul as needed  Patient seen by hospice liaison and at this time does not qualify for inpatient hospice  He appears comfortable today   Septic shock (HCC)  Patient initially presented with hypotension, hypothermia in the setting of UTI  Initially in the ICU.  Urine culture with Citrobacter  Was on Levophed which was discontinued on 3/2, midodrine, also started on Solu-Cortef given refractory hypotension, hypoglycemia and hypothermia  Was on Rocephin  Acute kidney injury superimposed on chronic kidney disease  ([facility])  Lab Results   Component Value Date    EGFR 9 03/03/2025    EGFR 10 03/02/2025    EGFR 11 03/02/2025    CREATININE 4.92 (H) 03/03/2025    CREATININE 4.65 (H) 03/02/2025    CREATININE 4.45 (H) 03/02/2025   Creatinine 3.78 on admission, continued to worsen  Seen by nephrology.  Baseline creatinine 2.5-3  CT scan negative for hydronephrosis  Stopping all labs and focusing on comfort   Pericardial effusion  Patient with history of pericardial effusion and status post pericardiocentesis on 6/30/2023  Thrombocytopenia ([facility])  Platelets of 35K on arrival, history of thrombocytopenia  Last platelet count 44  Diabetes mellitus with peripheral artery disease ([facility])  Lab Results   Component Value Date    HGBA1C 7.7 (H) 12/07/2024       Recent Labs     03/02/25  1647 03/02/25  2044 03/03/25  0549 03/03/25  1115   POCGLU 141* 161* 164* 154*     Currently on comfort care  UTI (urinary tract infection)  Was on IV Rocephin while in the ICU.  Urine culture with Citrobacter  Chronic atrial fibrillation (HCC)  Eliquis was held in the setting of  thrombocytopenia while in the ICU  Hypothermia  Hypothermia likely due to sepsis.  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  Moderate pericardial effusion noted  Received Lasix previously while in the ICU with poor response  High anion gap metabolic acidosis  Bicarb was slowly improving.  Nephrology was involved     Medical Problems       Resolved Problems  Date Reviewed: 3/3/2025          Resolved    Diarrhea 3/3/2025     Resolved by  SUSANA Rodriguez    Nausea and vomiting 2/27/2025     Resolved by  SUSANA Rodriguez    Metabolic encephalopathy 2/27/2025     Resolved by  Shaunna Calix, SUSANA    Bradycardia 2/27/2025     Resolved by  SUSANA Rodriguez        Discharging Physician / Practitioner: SUSANA Barnett  PCP: Nicho Baltazar DO  Admission Date:   Admission Orders (From admission, onward)       Ordered        02/25/25 1954  INPATIENT ADMISSION  Once                          Discharge Date: 03/05/25      Reason for Admission: sepsis     Hospital Course:   Yao Tipton is a 84 y.o. male patient who originally presented to the hospital on 2/25/2025 with having a past medical history of combined heart failure (EF 45%), afib on eliquis, thrombocytopenia, CKD, HLD who presented from [facility] with decreased appetite, nausea and vomiting. On arrival he  "was found to be hypothermic and hypotensive. Septic workup initiated. He received cefepime and fluid resuscitation with intermittent improvement in BP's but ultimately his blood pressure remained low and critical care was asked to evaluate. He was placed in the ICU for a few days and showed no signs of improvement. After discussion with the patients family, they decided to pursue hospice care. He will remain in the hospital under hospice care.         Please see above list of diagnoses and related plan for additional information.     Condition at Discharge: terminal    Discharge Day Visit / Exam:   Subjective:  offers no response   Vitals: Blood Pressure: (!) 85/50 (03/05/25 0831)  Pulse: 60 (03/05/25 0831)  Temperature: (!) 97.1 °F (36.2 °C) (03/05/25 0831)  Temp Source: Temporal (03/05/25 0831)  Respirations: (!) 10 (03/05/25 0831)  Height: 5' 8\" (172.7 cm) (02/26/25 0845)  Weight - Scale: 72.1 kg (158 lb 15.2 oz) (03/03/25 0546)  SpO2: 90 % (03/05/25 0831)  Physical Exam  Constitutional:       Appearance: He is ill-appearing.   Cardiovascular:      Rate and Rhythm: Normal rate and regular rhythm.   Pulmonary:      Effort: Pulmonary effort is normal.      Breath sounds: Normal breath sounds.   Abdominal:      General: Abdomen is flat.   Skin:     General: Skin is warm.      Coloration: Skin is pale.          Discussion with Family: Updated  (son) at bedside.    Discharge instructions/Information to patient and family:   See after visit summary for information provided to patient and family.      Provisions for Follow-Up Care:  See after visit summary for information related to follow-up care and any pertinent home health orders.      Mobility at time of Discharge:   Basic Mobility Inpatient Raw Score: 6  JH-HLM Goal: 2: Bed activities/Dependent transfer  JH-HLM Achieved: 2: Bed activities/Dependent transfer  HLM Goal NOT achieved. Continue to encourage mobility in post discharge setting.   "   Disposition:   Other: to stay at [facility] under [facility] hospice care     Planned Readmission: to hospice at [facility]     Discharge Medications:  See after visit summary for reconciled discharge medications provided to patient and/or family.      Administrative Statements   Discharge Statement:  I have spent a total time of 30 minutes in caring for this patient on the day of the visit/encounter. >30 minutes of time was spent on: Diagnostic results, Risks and benefits of tx options, Instructions for management, Patient and family education, and Importance of tx compliance.    **Please Note: This note may have been constructed using a voice recognition system**

## 2025-03-05 NOTE — PLAN OF CARE
Problem: Potential for Falls  Goal: Patient will remain free of falls  Description: INTERVENTIONS:  - Educate patient/family on patient safety including physical limitations  - Instruct patient to call for assistance with activity   - Consult OT/PT to assist with strengthening/mobility   - Keep Call bell within reach  - Keep bed low and locked with side rails adjusted as appropriate  - Keep care items and personal belongings within reach  - Initiate and maintain comfort rounds  - Make Fall Risk Sign visible to staff  - Offer Toileting every 2 Hours, in advance of need  - Initiate/Maintain bed/chair alarm  - Obtain necessary fall risk management equipment  - Apply yellow socks and bracelet for high fall risk patients  - Consider moving patient to room near nurses station  Outcome: Progressing     Problem: PAIN - ADULT  Goal: Verbalizes/displays adequate comfort level or baseline comfort level  Description: Interventions:  - Encourage patient to monitor pain and request assistance  - Assess pain using appropriate pain scale  - Administer analgesics based on type and severity of pain and evaluate response  - Implement non-pharmacological measures as appropriate and evaluate response  - Consider cultural and social influences on pain and pain management  - Notify physician/advanced practitioner if interventions unsuccessful or patient reports new pain  Outcome: Progressing     Problem: INFECTION - ADULT  Goal: Absence or prevention of progression during hospitalization  Description: INTERVENTIONS:  - Assess and monitor for signs and symptoms of infection  - Monitor lab/diagnostic results  - Monitor all insertion sites, i.e. indwelling lines, tubes, and drains  - Monitor endotracheal if appropriate and nasal secretions for changes in amount and color  - Plain appropriate cooling/warming therapies per order  - Administer medications as ordered  - Instruct and encourage patient and family to use good hand hygiene  technique  - Identify and instruct in appropriate isolation precautions for identified infection/condition  Outcome: Progressing     Problem: DISCHARGE PLANNING  Goal: Discharge to home or other facility with appropriate resources  Description: INTERVENTIONS:  - Identify barriers to discharge w/patient and caregiver  - Arrange for needed discharge resources and transportation as appropriate  - Identify discharge learning needs (meds, wound care, etc.)  - Arrange for interpretive services to assist at discharge as needed  - Refer to Case Management Department for coordinating discharge planning if the patient needs post-hospital services based on physician/advanced practitioner order or complex needs related to functional status, cognitive ability, or social support system  Outcome: Progressing     Problem: Knowledge Deficit  Goal: Patient/family/caregiver demonstrates understanding of disease process, treatment plan, medications, and discharge instructions  Description: Complete learning assessment and assess knowledge base.  Interventions:  - Provide teaching at level of understanding  - Provide teaching via preferred learning methods  Outcome: Progressing     Problem: NEUROSENSORY - ADULT  Goal: Achieves stable or improved neurological status  Description: INTERVENTIONS  - Monitor and report changes in neurological status  - Monitor vital signs such as temperature, blood pressure, glucose, and any other labs ordered   - Initiate measures to prevent increased intracranial pressure  - Monitor for seizure activity and implement precautions if appropriate      Outcome: Progressing     Problem: CARDIOVASCULAR - ADULT  Goal: Maintains optimal cardiac output and hemodynamic stability  Description: INTERVENTIONS:  - Monitor I/O, vital signs and rhythm  - Monitor for S/S and trends of decreased cardiac output  - Administer and titrate ordered vasoactive medications to optimize hemodynamic stability  - Assess quality of  pulses, skin color and temperature  - Assess for signs of decreased coronary artery perfusion  - Instruct patient to report change in severity of symptoms  Outcome: Progressing  Goal: Absence of cardiac dysrhythmias or at baseline rhythm  Description: INTERVENTIONS:  - Continuous cardiac monitoring, vital signs, obtain 12 lead EKG if ordered  - Administer antiarrhythmic and heart rate control medications as ordered  - Monitor electrolytes and administer replacement therapy as ordered  Outcome: Progressing     Problem: GASTROINTESTINAL - ADULT  Goal: Minimal or absence of nausea and/or vomiting  Description: INTERVENTIONS:  - Administer IV fluids if ordered to ensure adequate hydration  - Maintain NPO status until nausea and vomiting are resolved  - Nasogastric tube if ordered  - Administer ordered antiemetic medications as needed  - Provide nonpharmacologic comfort measures as appropriate  - Advance diet as tolerated, if ordered  - Consider nutrition services referral to assist patient with adequate nutrition and appropriate food choices  Outcome: Progressing  Goal: Maintains or returns to baseline bowel function  Description: INTERVENTIONS:  - Assess bowel function  - Encourage oral fluids to ensure adequate hydration  - Administer IV fluids if ordered to ensure adequate hydration  - Administer ordered medications as needed  - Encourage mobilization and activity  - Consider nutritional services referral to assist patient with adequate nutrition and appropriate food choices  Outcome: Progressing  Goal: Maintains adequate nutritional intake  Description: INTERVENTIONS:  - Monitor percentage of each meal consumed  - Identify factors contributing to decreased intake, treat as appropriate  - Assist with meals as needed  - Monitor I&O, weight, and lab values if indicated  - Obtain nutrition services referral as needed  Outcome: Progressing     Problem: GENITOURINARY - ADULT  Goal: Maintains or returns to baseline urinary  function  Description: INTERVENTIONS:  - Assess urinary function  - Encourage oral fluids to ensure adequate hydration if ordered  - Administer IV fluids as ordered to ensure adequate hydration  - Administer ordered medications as needed  - Offer frequent toileting  - Follow urinary retention protocol if ordered  Outcome: Progressing  Goal: Absence of urinary retention  Description: INTERVENTIONS:  - Assess patient’s ability to void and empty bladder  - Monitor I/O  - Bladder scan as needed  - Discuss with physician/AP medications to alleviate retention as needed  - Discuss catheterization for long term situations as appropriate  Outcome: Progressing     Problem: METABOLIC, FLUID AND ELECTROLYTES - ADULT  Goal: Electrolytes maintained within normal limits  Description: INTERVENTIONS:  - Monitor labs and assess patient for signs and symptoms of electrolyte imbalances  - Administer electrolyte replacement as ordered  - Monitor response to electrolyte replacements, including repeat lab results as appropriate  - Instruct patient on fluid and nutrition as appropriate  Outcome: Progressing  Goal: Fluid balance maintained  Description: INTERVENTIONS:  - Monitor labs   - Monitor I/O and WT  - Instruct patient on fluid and nutrition as appropriate  - Assess for signs & symptoms of volume excess or deficit  Outcome: Progressing     Problem: SKIN/TISSUE INTEGRITY - ADULT  Goal: Incision(s), wounds(s) or drain site(s) healing without S/S of infection  Description: INTERVENTIONS  - Assess and document dressing, incision, wound bed, drain sites and surrounding tissue  - Provide patient and family education  - Perform skin care/dressing changes every shift  Outcome: Progressing  Goal: Pressure injury heals and does not worsen  Description: Interventions:  - Implement low air loss mattress or specialty surface (Criteria met)  - Apply silicone foam dressing  - Instruct/assist with weight shifting every 90 minutes when in chair   -  Limit chair time to 4 hour intervals  - Use special pressure reducing interventions such as EHOB when in chair   - Apply fecal or urinary incontinence containment device   - Perform passive or active ROM every 2 hours  - Turn and reposition patient & offload bony prominences every 2 hours   - Utilize friction reducing device or surface for transfers   - Consider consults to  interdisciplinary teams   - Use incontinent care products after each incontinent episode   - Consider nutrition services referral as needed  Outcome: Progressing     Problem: Nutrition/Hydration-ADULT  Goal: Nutrient/Hydration intake appropriate for improving, restoring or maintaining nutritional needs  Description: Monitor and assess patient's nutrition/hydration status for malnutrition. Collaborate with interdisciplinary team and initiate plan and interventions as ordered.  Monitor patient's weight and dietary intake as ordered or per policy. Utilize nutrition screening tool and intervene as necessary. Determine patient's food preferences and provide high-protein, high-caloric foods as appropriate.     INTERVENTIONS:  - Monitor oral intake, urinary output, labs, and treatment plans  - Assess nutrition and hydration status and recommend course of action  - Evaluate amount of meals eaten  - Assist patient with eating if necessary   - Allow adequate time for meals  - Recommend/ encourage appropriate diets, oral nutritional supplements, and vitamin/mineral supplements  - Order, calculate, and assess calorie counts as needed  - Recommend, monitor, and adjust tube feedings and TPN/PPN based on assessed needs  - Assess need for intravenous fluids  - Provide specific nutrition/hydration education as appropriate  - Include patient/family/caregiver in decisions related to nutrition  Outcome: Progressing     Problem: Prexisting or High Potential for Compromised Skin Integrity  Goal: Skin integrity is maintained or improved  Description: INTERVENTIONS:  -  Identify patients at risk for skin breakdown  - Assess and monitor skin integrity  - Assess and monitor nutrition and hydration status  - Monitor labs   - Assess for incontinence   - Turn and reposition patient  - Assist with mobility/ambulation  - Relieve pressure over bony prominences  - Avoid friction and shearing  - Provide appropriate hygiene as needed including keeping skin clean and dry  - Evaluate need for skin moisturizer/barrier cream  - Collaborate with interdisciplinary team   - Patient/family teaching  - Consider wound care consult   Outcome: Progressing

## 2025-03-05 NOTE — ASSESSMENT & PLAN NOTE
RN reviewing labs for surgery, CXR and EKG were [facility] with his labs.    While in the ICU, ICU team spoke with patient's son and patient transition to comfort care  Continue IV Dilaudid, IV Ativan, IV Haldol, Robinul as needed  Patient seen by hospice liaison and at this time does not qualify for inpatient hospice  He appears comfortable today

## 2025-03-05 NOTE — ASSESSMENT & PLAN NOTE
While in the ICU, ICU team spoke with patient's son and patient transition to comfort care  Continue IV Dilaudid, IV Ativan, IV Haldol, Robinul as needed  Patient seen by hospice liaison and at this time does not qualify for inpatient hospice  He appears comfortable today

## 2025-03-05 NOTE — QUICK NOTE
Son updated at bedside. Patient groaning and having labored breathing during the visit. Will ask nurse to medicate with prn. Await hospice eval.

## 2025-03-05 NOTE — ASSESSMENT & PLAN NOTE
Patient is transition to inpatient hospice care.  Started on morphine infusion with breakthrough morphine 2 mg every 2 hours as needed as well as Ativan IV as needed

## 2025-03-05 NOTE — PROGRESS NOTES
Progress Note - Hospitalist   Name: Yao Tipton 84 y.o. male I MRN: [id number]  Unit/Bed#: [address] [place] [address] Date of Admission: 2/25/2025   Date of Service: 3/5/2025 I Hospital Day: 8    Assessment & Plan  Comfort measures only status  While in the ICU, ICU team spoke with patient's son and patient transition to comfort care  Continue IV Dilaudid, IV Ativan, IV Haldol, Robinul as needed  Patient seen by hospice liaison and at this time does not qualify for inpatient hospice  He appears comfortable today   Septic shock (HCC)  Patient initially presented with hypotension, hypothermia in the setting of UTI  Initially in the ICU.  Urine culture with Citrobacter  Was on Levophed which was discontinued on 3/2, midodrine, also started on Solu-Cortef given refractory hypotension, hypoglycemia and hypothermia  Was on Rocephin  Acute kidney injury superimposed on chronic kidney disease  ([facility])  Lab Results   Component Value Date    EGFR 9 03/03/2025    EGFR 10 03/02/2025    EGFR 11 03/02/2025    CREATININE 4.92 (H) 03/03/2025    CREATININE 4.65 (H) 03/02/2025    CREATININE 4.45 (H) 03/02/2025   Creatinine 3.78 on admission, continued to worsen  Seen by nephrology.  Baseline creatinine 2.5-3  CT scan negative for hydronephrosis  Stopping all labs and focusing on comfort   Pericardial effusion  Patient with history of pericardial effusion and status post pericardiocentesis on 6/30/2023  Thrombocytopenia ([facility])  Platelets of 35K on arrival, history of thrombocytopenia  Last platelet count 44  Diabetes mellitus with peripheral artery disease ([facility])  Lab Results   Component Value Date    HGBA1C 7.7 (H) 12/07/2024       Recent Labs     03/02/25  1647 03/02/25  2044 03/03/25  0549 03/03/25  1115   POCGLU 141* 161* 164* 154*     Currently on comfort care  UTI (urinary tract infection)  Was on IV Rocephin while in the ICU.  Urine culture with Citrobacter  Chronic atrial fibrillation (HCC)  Eliquis was held in the setting of  thrombocytopenia while in the ICU  Hypothermia  Hypothermia likely due to sepsis.  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  Moderate pericardial effusion noted  Received Lasix previously while in the ICU with poor response  High anion gap metabolic acidosis  Bicarb was slowly improving.  Nephrology was involved    VTE Pharmacologic Prophylaxis:    on comfort care     Mobility:   Basic Mobility Inpatient Raw Score: 6  JH-HLM Goal: 2: Bed activities/Dependent transfer  JH-HLM Achieved: 2: Bed activities/Dependent transfer  JH-HLM Goal NOT achieved. Continue with multidisciplinary rounding and encourage appropriate mobility to improve upon JH-HLM goals.    Patient Centered Rounds: I performed bedside rounds with nursing staff today.   Discussions with Specialists or Other Care Team Provider: Primary RN and case management    Education and Discussions with Family / Patient: Updated  (son) via phone.    Current Length of Stay: 8 day(s)  Current Patient Status: Inpatient   Certification Statement: The patient will continue to require additional inpatient hospital stay due to declining medical condition and safe discharge planning  Discharge Plan: Anticipate discharge in 24-48 hrs to rehab facility.    Code Status: Level 4 - Comfort Care    Subjective   Nursing reports no overnight events.  He is laying quietly and has not  required any IV medication    Objective :  Temp:  [97.1 °F (36.2 °C)-97.3 °F (36.3 °C)] 97.1 °F (36.2 °C)  HR:  [55-60] 60  BP: (77-85)/(50-51) 85/50  Resp:  [10-12] 10  SpO2:  [90 %-91 %] 90 %  O2 Device: Nasal cannula  Nasal Cannula O2 Flow Rate (L/min):  [2 L/min] 2 L/min    Body mass index is 24.17 kg/m².     Input and Output Summary (last 24 hours):   No intake or output data in the 24 hours ending 03/05/25 1205    Physical Exam  Constitutional:       Comments: Laying quietly in bed does not open eyes   HENT:      Mouth/Throat:      Mouth: Mucous membranes are dry.   Cardiovascular:      Rate and Rhythm: Normal rate.      Pulses: Normal pulses.      Heart sounds: Normal heart sounds.      Comments: Edematous hands and legs  Pulmonary:      Effort: Pulmonary effort is normal.      Breath sounds: Normal breath sounds.      Comments: Respirations easy and nonlabored  Not tachypneic  Abdominal:      General: Abdomen is flat.   Skin:     General: Skin is warm and dry.      Findings: Bruising present.   Neurological:      Comments: Opens eyes when rolled side-to-side otherwise not responsive           Lines/Drains:  Lines/Drains/Airways       Active Status       Name Placement date Placement time Site Days    Urethral Catheter 16 Fr. 02/27/25  1130  --  6                  Urinary Catheter:  Goal for removal:  end of life, victor can remain in place for comfort                  Lab Results: I have reviewed the following results:   Results from last 7 days   Lab Units 03/03/25  0552 03/02/25  0518 03/01/25  0531   WBC Thousand/uL 14.10*   < > 12.64*   HEMOGLOBIN g/dL 8.9*   < > 8.7*   HEMATOCRIT % 26.7*   < > 27.2*   PLATELETS Thousands/uL 44*   < > 54*   SEGS PCT %  --   --  91*   LYMPHO PCT %  --   --  4*   MONO PCT %  --   --  4   EOS PCT %  --   --  0    < > = values in this interval not displayed.     Results from last 7 days   Lab Units 03/03/25  0552 02/28/25  0605 02/27/25  0514   SODIUM mmol/L 136   < > 137    POTASSIUM mmol/L 5.0   < > 3.9   CHLORIDE mmol/L 104   < > 104   CO2 mmol/L 18*   < > 20*   BUN mg/dL 123*   < > 104*   CREATININE mg/dL 4.92*   < > 3.76*   ANION GAP mmol/L 14*   < > 13   CALCIUM mg/dL 8.1*   < > 7.5*   ALBUMIN g/dL  --   --  2.3*   TOTAL BILIRUBIN mg/dL  --   --  0.49   ALK PHOS U/L  --   --  104   ALT U/L  --   --  18   AST U/L  --   --  16   GLUCOSE RANDOM mg/dL 163*   < > 68    < > = values in this interval not displayed.     Results from last 7 days   Lab Units 02/28/25  0605   INR  1.47*     Results from last 7 days   Lab Units 03/03/25  1115 03/03/25  0549 03/02/25  2044 03/02/25  1647 03/02/25  1143 03/02/25  0736 03/01/25  2130 03/01/25  1624 03/01/25  1101 03/01/25  0729 03/01/25  0004 02/28/25  2033   POC GLUCOSE mg/dl 154* 164* 161* 141* 175* 172* 163* 216* 228* 239* 243* 200*         Results from last 7 days   Lab Units 03/02/25  0518 03/01/25  0531   PROCALCITONIN ng/ml 0.23 0.31*       Recent Cultures (last 7 days):         Imaging Results Review: I reviewed radiology reports from this admission including: CT abdomen/pelvis.  Other Study Results Review: No additional pertinent studies reviewed.    Last 24 Hours Medication List:     Current Facility-Administered Medications:     glycopyrrolate (ROBINUL) injection 0.1 mg, Q4H PRN    haloperidol lactate (HALDOL) injection 0.5 mg, Q2H PRN    HYDROmorphone (DILAUDID) injection 0.3 mg, Q2H PRN    LORazepam (ATIVAN) injection 1 mg, Q10 Min PRN    Administrative Statements   Today, Patient Was Seen By: SUSANA Barnett      **Please Note: This note may have been constructed using a voice recognition system.**

## 2025-03-05 NOTE — PLAN OF CARE
Problem: Potential for Falls  Goal: Patient will remain free of falls  Description: INTERVENTIONS:  - Educate patient/family on patient safety including physical limitations  - Instruct patient to call for assistance with activity   - Consult OT/PT to assist with strengthening/mobility   - Keep Call bell within reach  - Keep bed low and locked with side rails adjusted as appropriate  - Keep care items and personal belongings within reach  - Initiate and maintain comfort rounds  - Make Fall Risk Sign visible to staff  - Offer Toileting every 2 Hours, in advance of need  - Initiate/Maintain bed/chair alarm  - Obtain necessary fall risk management equipment  - Apply yellow socks and bracelet for high fall risk patients  - Consider moving patient to room near nurses station  Outcome: Progressing     Problem: PAIN - ADULT  Goal: Verbalizes/displays adequate comfort level or baseline comfort level  Description: Interventions:  - Encourage patient to monitor pain and request assistance  - Assess pain using appropriate pain scale  - Administer analgesics based on type and severity of pain and evaluate response  - Implement non-pharmacological measures as appropriate and evaluate response  - Consider cultural and social influences on pain and pain management  - Notify physician/advanced practitioner if interventions unsuccessful or patient reports new pain  Outcome: Progressing     Problem: INFECTION - ADULT  Goal: Absence or prevention of progression during hospitalization  Description: INTERVENTIONS:  - Assess and monitor for signs and symptoms of infection  - Monitor lab/diagnostic results  - Monitor all insertion sites, i.e. indwelling lines, tubes, and drains  - Monitor endotracheal if appropriate and nasal secretions for changes in amount and color  - Deer appropriate cooling/warming therapies per order  - Administer medications as ordered  - Instruct and encourage patient and family to use good hand hygiene  technique  - Identify and instruct in appropriate isolation precautions for identified infection/condition  Outcome: Progressing     Problem: DISCHARGE PLANNING  Goal: Discharge to home or other facility with appropriate resources  Description: INTERVENTIONS:  - Identify barriers to discharge w/patient and caregiver  - Arrange for needed discharge resources and transportation as appropriate  - Identify discharge learning needs (meds, wound care, etc.)  - Arrange for interpretive services to assist at discharge as needed  - Refer to Case Management Department for coordinating discharge planning if the patient needs post-hospital services based on physician/advanced practitioner order or complex needs related to functional status, cognitive ability, or social support system  Outcome: Progressing     Problem: Knowledge Deficit  Goal: Patient/family/caregiver demonstrates understanding of disease process, treatment plan, medications, and discharge instructions  Description: Complete learning assessment and assess knowledge base.  Interventions:  - Provide teaching at level of understanding  - Provide teaching via preferred learning methods  Outcome: Progressing     Problem: NEUROSENSORY - ADULT  Goal: Achieves stable or improved neurological status  Description: INTERVENTIONS  - Monitor and report changes in neurological status  - Monitor vital signs such as temperature, blood pressure, glucose, and any other labs ordered   - Initiate measures to prevent increased intracranial pressure  - Monitor for seizure activity and implement precautions if appropriate      Outcome: Progressing     Problem: CARDIOVASCULAR - ADULT  Goal: Maintains optimal cardiac output and hemodynamic stability  Description: INTERVENTIONS:  - Monitor I/O, vital signs and rhythm  - Monitor for S/S and trends of decreased cardiac output  - Administer and titrate ordered vasoactive medications to optimize hemodynamic stability  - Assess quality of  pulses, skin color and temperature  - Assess for signs of decreased coronary artery perfusion  - Instruct patient to report change in severity of symptoms  Outcome: Progressing  Goal: Absence of cardiac dysrhythmias or at baseline rhythm  Description: INTERVENTIONS:  - Continuous cardiac monitoring, vital signs, obtain 12 lead EKG if ordered  - Administer antiarrhythmic and heart rate control medications as ordered  - Monitor electrolytes and administer replacement therapy as ordered  Outcome: Progressing     Problem: GASTROINTESTINAL - ADULT  Goal: Minimal or absence of nausea and/or vomiting  Description: INTERVENTIONS:  - Administer IV fluids if ordered to ensure adequate hydration  - Maintain NPO status until nausea and vomiting are resolved  - Nasogastric tube if ordered  - Administer ordered antiemetic medications as needed  - Provide nonpharmacologic comfort measures as appropriate  - Advance diet as tolerated, if ordered  - Consider nutrition services referral to assist patient with adequate nutrition and appropriate food choices  Outcome: Progressing  Goal: Maintains or returns to baseline bowel function  Description: INTERVENTIONS:  - Assess bowel function  - Encourage oral fluids to ensure adequate hydration  - Administer IV fluids if ordered to ensure adequate hydration  - Administer ordered medications as needed  - Encourage mobilization and activity  - Consider nutritional services referral to assist patient with adequate nutrition and appropriate food choices  Outcome: Progressing  Goal: Maintains adequate nutritional intake  Description: INTERVENTIONS:  - Monitor percentage of each meal consumed  - Identify factors contributing to decreased intake, treat as appropriate  - Assist with meals as needed  - Monitor I&O, weight, and lab values if indicated  - Obtain nutrition services referral as needed  Outcome: Progressing     Problem: GENITOURINARY - ADULT  Goal: Maintains or returns to baseline urinary  function  Description: INTERVENTIONS:  - Assess urinary function  - Encourage oral fluids to ensure adequate hydration if ordered  - Administer IV fluids as ordered to ensure adequate hydration  - Administer ordered medications as needed  - Offer frequent toileting  - Follow urinary retention protocol if ordered  Outcome: Progressing  Goal: Absence of urinary retention  Description: INTERVENTIONS:  - Assess patient’s ability to void and empty bladder  - Monitor I/O  - Bladder scan as needed  - Discuss with physician/AP medications to alleviate retention as needed  - Discuss catheterization for long term situations as appropriate  Outcome: Progressing     Problem: METABOLIC, FLUID AND ELECTROLYTES - ADULT  Goal: Electrolytes maintained within normal limits  Description: INTERVENTIONS:  - Monitor labs and assess patient for signs and symptoms of electrolyte imbalances  - Administer electrolyte replacement as ordered  - Monitor response to electrolyte replacements, including repeat lab results as appropriate  - Instruct patient on fluid and nutrition as appropriate  Outcome: Progressing  Goal: Fluid balance maintained  Description: INTERVENTIONS:  - Monitor labs   - Monitor I/O and WT  - Instruct patient on fluid and nutrition as appropriate  - Assess for signs & symptoms of volume excess or deficit  Outcome: Progressing     Problem: SKIN/TISSUE INTEGRITY - ADULT  Goal: Incision(s), wounds(s) or drain site(s) healing without S/S of infection  Description: INTERVENTIONS  - Assess and document dressing, incision, wound bed, drain sites and surrounding tissue  - Provide patient and family education  - Perform skin care/dressing changes every shift  Outcome: Progressing  Goal: Pressure injury heals and does not worsen  Description: Interventions:  - Implement low air loss mattress or specialty surface (Criteria met)  - Apply silicone foam dressing  - Instruct/assist with weight shifting every 90 minutes when in chair   -  Limit chair time to 4 hour intervals  - Use special pressure reducing interventions such as EHOB when in chair   - Apply fecal or urinary incontinence containment device   - Perform passive or active ROM every 2 hours  - Turn and reposition patient & offload bony prominences every 2 hours   - Utilize friction reducing device or surface for transfers   - Consider consults to  interdisciplinary teams   - Use incontinent care products after each incontinent episode   - Consider nutrition services referral as needed  Outcome: Progressing     Problem: Nutrition/Hydration-ADULT  Goal: Nutrient/Hydration intake appropriate for improving, restoring or maintaining nutritional needs  Description: Monitor and assess patient's nutrition/hydration status for malnutrition. Collaborate with interdisciplinary team and initiate plan and interventions as ordered.  Monitor patient's weight and dietary intake as ordered or per policy. Utilize nutrition screening tool and intervene as necessary. Determine patient's food preferences and provide high-protein, high-caloric foods as appropriate.     INTERVENTIONS:  - Monitor oral intake, urinary output, labs, and treatment plans  - Assess nutrition and hydration status and recommend course of action  - Evaluate amount of meals eaten  - Assist patient with eating if necessary   - Allow adequate time for meals  - Recommend/ encourage appropriate diets, oral nutritional supplements, and vitamin/mineral supplements  - Order, calculate, and assess calorie counts as needed  - Recommend, monitor, and adjust tube feedings and TPN/PPN based on assessed needs  - Assess need for intravenous fluids  - Provide specific nutrition/hydration education as appropriate  - Include patient/family/caregiver in decisions related to nutrition  Outcome: Progressing     Problem: Prexisting or High Potential for Compromised Skin Integrity  Goal: Skin integrity is maintained or improved  Description: INTERVENTIONS:  -  Identify patients at risk for skin breakdown  - Assess and monitor skin integrity  - Assess and monitor nutrition and hydration status  - Monitor labs   - Assess for incontinence   - Turn and reposition patient  - Assist with mobility/ambulation  - Relieve pressure over bony prominences  - Avoid friction and shearing  - Provide appropriate hygiene as needed including keeping skin clean and dry  - Evaluate need for skin moisturizer/barrier cream  - Collaborate with interdisciplinary team   - Patient/family teaching  - Consider wound care consult   Outcome: Progressing

## 2025-03-06 NOTE — NURSING NOTE
[facility] contacted, per Regina at [facility] asked age of  patient and informed me they would not be following up with family.

## 2025-03-06 NOTE — DEATH NOTE
INPATIENT DEATH NOTE  Yao Tipton 84 y.o. male MRN: [id number]  Unit/Bed#: [address] [place] [address] Encounter: [id number]             PHYSICAL EXAM:  Unresponsive to noxious stimuli, Spontaneous respirations absent, Breath sounds absent, Carotid pulse absent, Heart sounds absent, Pupillary light reflex absent, and Corneal blink reflex absent    Medical Examiner notification criteria:  NONE APPLICABLE   Medical Examiner's office notified?:  No, does not meet ME notification criteria   Medical Examiner accepted case?:  No  Name of Medical Examiner: not applicable          Autopsy Options:  Post-mortem examination declined by next of kin    Primary Service Attending Physician notified?:  yes - Attending:  Phoebe Perez DO    Physician/Resident responsible for completing Discharge Summary:  Janice Sullivan

## 2025-03-06 NOTE — DISCHARGE SUMMARY
Discharge Summary - Hospitalist   Name: Yao Tipton 84 y.o. male I MRN: [id number]  Unit/Bed#: [address] I Date of Admission: 3/5/2025   Date of Service: 3/6/2025 I Hospital Day: 0     Assessment & Plan  Hospice care  Patient passed away peacefully   Comfort measures only status  Patient's son is aware.     Medical Problems       Resolved Problems  Date Reviewed: 3/3/2025   None       Discharging Physician / Practitioner: SUSANA Barnett  PCP: Nicho Baltazar DO  Admission Date:   Admission Orders (From admission, onward)       Ordered        25 1605  Admit Patient to  Once                          Discharge Date: 25       Hospital Course:   Yao Tipton is a 84 y.o. male patient who originally presented to the hospital on 3/5/2025 due to ARF and sepsis. His condition deteriorated and his son ultimately opted for hospice care. This was started yesterday and the patient  on 3/6/25.           Please see above list of diagnoses and related plan for additional information.       Discussion with Family: Updated  (son) at bedside.    Discharge instructions/Information to patient and family:   See after visit summary for information provided to patient and family.      Disposition:   Other:  home         Discharge Medications:  See after visit summary for reconciled discharge medications provided to patient and/or family.      Administrative Statements   Discharge Statement:  I have spent a total time of 30 minutes in caring for this patient on the day of the visit/encounter. .    **Please Note: This note may have been constructed using a voice recognition system**

## 2025-03-06 NOTE — QUICK NOTE
Patient with cessation of pulse and respirations at 12:57 PM.  This is his time of death.  Son has been updated and will be in to pay his final respects as soon as he possibly can.  Expect him within the next couple of hours.

## 2025-03-06 NOTE — [place] UNIVERSAL TRANSFER FORM
"[place] UNIV[place]AL TRANSFER FOR[place]  (ALL ITEMS MUST BE COMPLETED)    1. TRANSFER FROM: [facility] [address]      TRANSFER TO: [facility]    2. DATE OF TRANSFER: 3/5/24                        TIME OF TRANSFER: 1440    3. PATIENT NAME: Yao Tipton K      YOB: 1940                             GENDER: male    4. LANGUAGE:   English    5. PHYSICIAN NAME:  No att. providers found                   PHONE: [phone]    6. CODE STATUS: Level 4 - Comfort Care        Out of Hospital DNR Attached: No    7. :                                      :  Extended Emergency Contact Information  Primary Emergency Contact: González Tipton  Address: [address] [place]  Mobile Phone: [phone]  Relation: Son  Secondary Emergency Contact: GraceMarleneGisselle  [place] Phone: [phone]  Relation: Sister In-Law           Health Care Representative/Proxy:  No           Legal Guardian:  No             NAME OF:           HEALTH CARE REPRESENTATIVE/PROXY:                                         OR           LEGAL GUARDIAN, IF NOT :                                               PHONE:  (Day)           (Night)                        (Cell)    8. REASON FOR TRANSFER: (Must include brief medical history and recent changes in physical function or cognition.) transitioning to [facility] inpatient hospice            V/S: BP (!) 85/50 (BP Location: Left arm)   Pulse 60   Temp (!) 97.1 °F (36.2 °C) (Temporal)   Resp (!) 10   Ht 5' 8\" (1.727 m)   Wt 72.1 kg (158 lb 15.2 oz)   SpO2 90%   BMI 24.17 kg/m²           PAIN: None    9. PRIMARY DIAGNOSIS: Septic shock (HCC)      Secondary Diagnosis:         Pacemaker: No      Internal Defib: No          Mental Health Diagnosis (if Applicable):    10. RESTRAINTS: No     11. RESPIRATORY NEEDS: Oxygen Device NC for comfort,    12. ISOLATION/PRECAUTION: " C-diff    13. ALLERGY: Elemental sulfur and Sulfa antibiotics    14. SENSORY:       WDL    15. SKIN CONDITION: No Wounds    16. DIET: Regular    17. IV ACCESS: OtherPeripheral IV    18. PERSONAL ITEMS SENT WITH PATIENT: None    19. ATTACHED DOCUMENTS: MUST ATTACH CURRENT MEDICATION INFORMATION Face Sheet    20. AT RISK ALERTS:None        HARM TO: N/A    21. WEIGHT BEARING STATUS:         Left Leg: Limited        Right Leg: Limited    22. MENTAL STATUS:Alert    23. FUNCTION:        Walk: Not Able        Transfer: Not Able        Toilet: Not Able        Feed: Not Able    24. IMMUNIZATIONS/SCREENING:     Immunization History   Administered Date(s) Administered    Influenza, high dose seasonal 0.7 mL 01/05/2021    Pneumococcal Conjugate 13-Valent 01/13/2019    Tdap 01/05/2021       25. BOWEL: Incontinent     26. BLADDER: Dupont Catheter    27. SENDING FACILITY CONTACT:                   Title: [facility]         Unit: [address]        Phone: [phone]          REC'G FACILITY CONTACT (if known):        Title:        Unit:         Phone:         FORM PREFILLED BY (if applicable)       Title:       Unit:        Phone:         FORM COMPLETED BY Jessica Lombardo, RN      Title: [facility]      Phone: [phone]

## 2025-03-06 NOTE — PROGRESS NOTES
Progress Note - Hospitalist   Name: Yao Tipton 84 y.o. male I MRN: [id number]  Unit/Bed#: 3 [address] I Date of Admission: 3/5/2025   Date of Service: 3/6/2025 I Hospital Day: 0    Assessment & Plan  Hospice care  Patient is on inpatient hospice care.  Continue morphine infusion but increased the dose today per the hospice nurse recommendations and continue with breakthrough morphine 2 mg every 2 hours as needed as well as Ativan IV as needed  Comfort measures only status  Patient's son is aware of transitioning to hospice care.      Patient Centered Rounds: I performed bedside rounds with nursing staff today.   Discussions with Specialists or Other Care Team Provider: Primary RN     Education and Discussions with Family / Patient: Updated  (son) via phone. Will update son     Current Length of Stay: 0 day(s)  Current Patient Status: Hospice   Certification Statement:  patient is on hospice care       Code Status: Level 4 - Comfort Care    Subjective   Discussed with RN. No overnight events. He has remained comfortable appearing for the morning so far.     Objective :       Body mass index is 24.02 kg/m².     Input and Output Summary (last 24 hours):     Intake/Output Summary (Last 24 hours) at 3/6/2025 1040  Last data filed at 3/5/2025 2255  Gross per 24 hour   Intake --   Output 150 ml   Net -150 ml       Physical Exam  Constitutional:       Comments: Appears comfortable and no grunting or agitation.   Respirations are easy and no labored currently    Cardiovascular:      Rate and Rhythm: Normal rate and regular rhythm.      Heart sounds: Murmur heard.   Pulmonary:      Effort: Pulmonary effort is normal.      Breath sounds: No wheezing, rhonchi or rales.   Abdominal:      General: Bowel sounds are normal.   Musculoskeletal:         General: Swelling present.      Comments: Arms are swollen and bruised, no mottling noted    Skin:     Coloration: Skin is pale.            Lines/Drains:  Lines/Drains/Airways       Active Status       Name Placement date Placement time Site Days    Urethral Catheter 16 Fr. 02/27/25  1130  --  6                  Urinary Catheter:  Goal for removal:  hospice care, no removal due to this                  Lab Results: I have reviewed the following results:   Results from last 7 days   Lab Units 03/03/25  0552 03/02/25  0518 03/01/25  0531   WBC Thousand/uL 14.10*   < > 12.64*   HEMOGLOBIN g/dL 8.9*   < > 8.7*   HEMATOCRIT % 26.7*   < > 27.2*   PLATELETS Thousands/uL 44*   < > 54*   SEGS PCT %  --   --  91*   LYMPHO PCT %  --   --  4*   MONO PCT %  --   --  4   EOS PCT %  --   --  0    < > = values in this interval not displayed.     Results from last 7 days   Lab Units 03/03/25  0552   SODIUM mmol/L 136   POTASSIUM mmol/L 5.0   CHLORIDE mmol/L 104   CO2 mmol/L 18*   BUN mg/dL 123*   CREATININE mg/dL 4.92*   ANION GAP mmol/L 14*   CALCIUM mg/dL 8.1*   GLUCOSE RANDOM mg/dL 163*     Results from last 7 days   Lab Units 02/28/25  0605   INR  1.47*     Results from last 7 days   Lab Units 03/03/25  1115 03/03/25  0549 03/02/25  2044 03/02/25  1647 03/02/25  1143 03/02/25  0736 03/01/25  2130 03/01/25  1624 03/01/25  1101 03/01/25  0729 03/01/25  0004 02/28/25  2033   POC GLUCOSE mg/dl 154* 164* 161* 141* 175* 172* 163* 216* 228* 239* 243* 200*         Results from last 7 days   Lab Units 03/02/25  0518 03/01/25  0531   PROCALCITONIN ng/ml 0.23 0.31*       Last 24 Hours Medication List:     Current Facility-Administered Medications:     glycopyrrolate (ROBINUL) injection 0.1 mg, Q4H PRN    haloperidol lactate (HALDOL) injection 0.5 mg, Q2H PRN    LORazepam (ATIVAN) injection 1 mg, Q6H    morphine 250 mg in sodium chloride 0.9% 50mL drip, Continuous, Last Rate: 2 mg/hr (03/06/25 1024)    morphine injection 2 mg, Q2H PRN    Administrative Statements   Today, Patient Was Seen By: SUSANA Barnett      **Please Note: This note may have been  constructed using a voice recognition system.**

## 2025-03-06 NOTE — CASE MANAGEMENT
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) 2023     Dyslipidemia 2023     BPH (benign prostatic hyperplasia) 2023     Cellulitis of left lower extremity 2023     Constipation 08/10/2023     Pleural effusion 2023     Goals of care, counseling/discussion 2023     Duodenal ulcer 07/10/2023     Metabolic encephalopathy 2023     Recent empyema of lung with persistent locuted R hydropneumothorax 2023     Hypoglycemia 2023     Thrombocytopenia ([facility]) 2023     Hypothermia 2023     Septic shock ([facility]) 2023     Acute urinary retention 2023     Macrocytosis 2023     Chronic kidney disease-mineral and bone disorder 2023     Advanced care planning/counseling discussion 2023     Acute kidney injury superimposed on chronic kidney disease  (HCC) 2022     Benign hypertension with CKD (chronic kidney disease) stage IV ([facility]) 2022     Persistent proteinuria 2022     COVID-19 2022     Electrolyte abnormality 2022     Cellulitis of left upper extremity  01/05/2021     Chronic atrial fibrillation (HCC) 01/05/2021     Vitamin D deficiency 10/18/2019     Chronic kidney disease, stage 4 (severe) (HCC) 04/05/2019     Acute on chronic combined systolic and diastolic congestive heart failure (HCC) 01/29/2019     Pericardial effusion 01/13/2019     Leg edema 01/10/2019     Diabetes mellitus with peripheral artery disease (HCC) 01/10/2019     PVC (premature ventricular contraction) 12/19/2018     Essential hypertension 12/19/2018     Closed traumatic displaced fracture of distal end of left radius with malunion 02/09/2018       LOS (days): 0  Geometric Mean LOS (GMLOS) (days):   Days to GMLOS:     OBJECTIVE:         Current admission status: Hospice  Referral Reason: Hospice    Preferred Pharmacy:   [id number] - [address] [address]  Phone: [phone] Fax: [phone]    Primary Care Provider: Nicho Baltazar DO    Primary Insurance: MEDICARE  Secondary Insurance: BLUE CROSS    ASSESSMENT:  Active Health Care Proxies       González Tipton Health Care Agent - Son   Primary Phone: [phone] (Mobile)                  Patient Information  Mental Status: Sedated  Caregiver's Name:: González Tipton (son)  Caregiver's Relationship to Patient:: Family Member  Caregiver's Telephone Number:: [phone]  Support Systems: Son, Family members, Friends/neighbors  County of Residence: [place]  What [facility] do you live in?: [place]  Living Arrangements: Lives w/ Son      Patient transitioned to GIP status with [facility] on 3/5/25.  SW will continue to follow.

## 2025-03-06 NOTE — ASSESSMENT & PLAN NOTE
Patient is on inpatient hospice care.  Continue morphine infusion but increased the dose today per the hospice nurse recommendations and continue with breakthrough morphine 2 mg every 2 hours as needed as well as Ativan IV as needed

## 2025-03-19 NOTE — DISCHARGE SUMMARY
[facility]  Discharge- Yao Tipton 1940, 83 y.o. male MRN: [id number]  Unit/Bed#: [address] [place] [address] Encounter: [id number]  Primary Care Provider: Nicho Baltazar DO   Date and time admitted to hospital: 6/4/2024 12:22 PM    * Unable to care for self  Assessment & Plan  Patient presented to ED referred by [facility] nurse.  Reports that nurse was at the house today and patient's son who is the caregiver and POA confided in the notes that he wants to seek psychiatric help for himself as as he was suicidal and going to get admission at [facility].  [facility] nurse called 911 to bring in the patient for temporary placement because he cannot take care of himself neither can he stay at home alone.  ED provider spoke with son and confirmed the information.  6/5/24- spoke to patient's son today. He is now home and able to take patient once he stable for discharge.   6/6/24- Son confirms they already have VNA services come to their home. Son will be transporting patient home today.      Gout  Assessment & Plan  Continue allopurinol    BPH (benign prostatic hyperplasia)  Assessment & Plan  Continue tamsulosin    Dyslipidemia  Assessment & Plan  Continue statin    Chronic anemia  Assessment & Plan  Hemoglobin stable  Continue ferrous sulfate daily    Chronic atrial fibrillation (HCC)  Assessment & Plan  Rate controlled  Continue Eliquis    Chronic kidney disease, stage 4 (severe) (HCC)  Assessment & Plan  Lab Results   Component Value Date    EGFR 29 06/06/2024    EGFR 32 06/05/2024    EGFR 31 06/04/2024    CREATININE 1.98 (H) 06/06/2024    CREATININE 1.84 (H) 06/05/2024    CREATININE 1.90 (H) 06/04/2024     Baseline noted to be 2.5 - 3.  Creatinine stable  Follows with Dr. Guadarrama nephrology outpatient  Continue to monitor    Acute on chronic combined systolic and diastolic congestive heart failure (HCC)  Assessment & Plan  Wt Readings from Last 3 Encounters:   06/04/24 68.4 kg (150 lb 14.4 oz)   04/29/24 69.9 kg (154  lb)   01/23/24 69.9 kg (154 lb)       CXR: Moderate cardiomegaly. Mild pulmonary venous congestion with small effusions and bibasilar atelectasis.  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 Asymptomatic  Discontinue Lantus 5U at discharge  Continue with SSI and blood sugar checks  Hypoglycemia protocol  Follow up with PCP      Essential hypertension  Assessment & Plan  BP stable  Monitor vitals per routine    Hyponatremia-resolved as of 6/6/2024  Assessment & Plan  Patient follows with Dr. Guadarrama nephrology outpatient. Per outpatient recommendations  Low 2 g sodium diet  Fluid restriction 1800 mL/day  Sodium normalized  Daily BMP        Medical Problems       Resolved Problems  Date Reviewed: 6/6/2024            Resolved    Hyponatremia 6/6/2024     Resolved by  Marie De La O PA-C        Discharging Physician / Practitioner: Marie De La O PA-C  PCP: Nicho Baltazar DO  Admission Date:   Admission Orders (From admission, onward)       Ordered        06/04/24 1440  INPATIENT ADMISSION  Once                          Discharge Date: 06/06/24    Consultations During Hospital Stay:  None    Procedures Performed:   None    Significant Findings / Test Results:   CXR (6/4/24):  Moderate cardiomegaly. Mild pulmonary venous congestion with small effusions and bibasilar atelectasi    Incidental Findings:   None     Test Results Pending at Discharge (will require follow up):   None     Outpatient  "Tests Requested:  None    Complications:  None    Reason for Admission: Unable to care for self    Hospital Course:   Yao Tipton is a 83 y.o. male patient who originally presented to the hospital on 6/4/2024 due to will need to care for self.  Patient was referred to hospital by [facility] nurse saying that patient's son, who is a caregiver wanted to seek help for his psychiatric illness and patient would be left home alone.  Patient was admitted to the hospital for placement needs.  Patient did also have mild CHF exacerbation and hyponatremia which resolved with IV Lasix.  Patient now euvolemic and oxygenating well on room air.  Patient tolerating meals well.  Spoke with patient's son, who is now back home and able to take care of his father.  Patient's son will be picking patient up from the hospital today and they will go home with VNA services already set up in place.  All questions answered to the best of my ability.      Please see above list of diagnoses and related plan for additional information.     Condition at Discharge: stable    Discharge Day Visit / Exam:   Subjective:  Patient denies any acute complaints this morning. Tolerating meals. Eager to go home.   Vitals: Blood Pressure: 136/64 (06/06/24 0900)  Pulse: 89 (06/06/24 0900)  Temperature: 98 °F (36.7 °C) (06/06/24 0900)  Temp Source: Oral (06/06/24 0900)  Respirations: 18 (06/06/24 0900)  Height: 5' 7\" (170.2 cm) (06/04/24 2105)  Weight - Scale: 68.4 kg (150 lb 14.4 oz) (06/04/24 2105)  SpO2: 95 % (06/06/24 0900)  Exam:   Physical Exam  Vitals and nursing note reviewed.   Constitutional:       General: He is not in acute distress.     Appearance: He is well-developed. He is ill-appearing.   HENT:      Head: Normocephalic and atraumatic.   Eyes:      Conjunctiva/sclera: Conjunctivae normal.   Cardiovascular:      Rate and Rhythm: Normal rate and regular rhythm.      Heart sounds: No murmur heard.  Pulmonary:      Effort: Pulmonary effort is normal. " No respiratory distress.      Breath sounds: Normal breath sounds.   Abdominal:      Palpations: Abdomen is soft.      Tenderness: There is no abdominal tenderness.   Musculoskeletal:         General: No swelling.      Cervical back: Neck supple.   Skin:     General: Skin is warm and dry.      Capillary Refill: Capillary refill takes less than 2 seconds.   Neurological:      Mental Status: He is alert. Mental status is at baseline.   Psychiatric:         Mood and Affect: Mood normal.          Discussion with Family: Updated  (son) via phone.    Discharge instructions/Information to patient and family:   See after visit summary for information provided to patient and family.      Provisions for Follow-Up Care:  See after visit summary for information related to follow-up care and any pertinent home health orders.      Mobility at time of Discharge:   Basic Mobility Inpatient Raw Score: 18  JH-HLM Goal: 6: Walk 10 steps or more  JH-HLM Achieved: 3: Sit at edge of bed  HLM Goal achieved. Continue to encourage appropriate mobility.     Disposition:   Home with VNA Services (Reminder: Complete face to face encounter)    Planned Readmission: None     Discharge Statement:  I spent 55 minutes discharging the patient. This time was spent on the day of discharge. I had direct contact with the patient on the day of discharge. Greater than 50% of the total time was spent examining patient, answering all patient questions, arranging and discussing plan of care with patient as well as directly providing post-discharge instructions.  Additional time then spent on discharge activities.    Discharge Medications:  See after visit summary for reconciled discharge medications provided to patient and/or family.      **Please Note: This note may have been constructed using a voice recognition system**       Unknown if ever smoked

## 2025-07-23 NOTE — ASSESSMENT & PLAN NOTE
Lab Results   Component Value Date    HGBA1C 8.8 (H) 11/20/2023       Recent Labs     11/21/23  2050 11/22/23  0632 11/22/23  0729 11/22/23  1137   POCGLU 325* 254* 276* 352*       Continue SSI, lantus. Blood sugar stable.  Holding po meds januvia and glimepiride   Update A1C Detail Level: Detailed Detail Level: Zone

## (undated) DEVICE — CATH SWAN GANZ TD 7FR 110CM

## (undated) DEVICE — DESTINATION PERIPHERAL GUIDING SHEATH: Brand: DESTINATION

## (undated) DEVICE — PERICARDIOCENTESIS KIT 18G

## (undated) DEVICE — MICROPUNCTURE 401